# Patient Record
Sex: MALE | Race: BLACK OR AFRICAN AMERICAN | NOT HISPANIC OR LATINO | Employment: OTHER | URBAN - METROPOLITAN AREA
[De-identification: names, ages, dates, MRNs, and addresses within clinical notes are randomized per-mention and may not be internally consistent; named-entity substitution may affect disease eponyms.]

---

## 2017-03-20 ENCOUNTER — CLINICAL SUPPORT (OUTPATIENT)
Dept: ELECTROPHYSIOLOGY | Facility: CLINIC | Age: 62
End: 2017-03-20
Payer: MEDICARE

## 2017-03-20 DIAGNOSIS — I42.8 NICM (NONISCHEMIC CARDIOMYOPATHY): ICD-10-CM

## 2017-03-20 DIAGNOSIS — Z95.810 ICD (IMPLANTABLE CARDIOVERTER-DEFIBRILLATOR) IN PLACE: ICD-10-CM

## 2017-03-20 PROCEDURE — 93296 REM INTERROG EVL PM/IDS: CPT | Mod: PBBFAC | Performed by: INTERNAL MEDICINE

## 2017-03-20 PROCEDURE — 93297 REM INTERROG DEV EVAL ICPMS: CPT | Mod: ,,, | Performed by: INTERNAL MEDICINE

## 2017-03-20 PROCEDURE — 93295 DEV INTERROG REMOTE 1/2/MLT: CPT | Mod: ,,, | Performed by: INTERNAL MEDICINE

## 2017-11-20 ENCOUNTER — TELEPHONE (OUTPATIENT)
Dept: ELECTROPHYSIOLOGY | Facility: CLINIC | Age: 62
End: 2017-11-20

## 2017-11-20 NOTE — TELEPHONE ENCOUNTER
I called patient to have him send a manual ICD transmission. He was just recently transferred into our device clinic. No answer. I left a voice message for him to return my call.

## 2017-11-21 ENCOUNTER — TELEPHONE (OUTPATIENT)
Dept: ELECTROPHYSIOLOGY | Facility: CLINIC | Age: 62
End: 2017-11-21

## 2017-11-21 NOTE — TELEPHONE ENCOUNTER
I tried to contact patient again about his remote ICD monitor. No answer. I left a voice message for him to return my call.

## 2017-11-21 NOTE — TELEPHONE ENCOUNTER
I called patient to have him send a manual transmission of his ICD. No answer. I left a voice message for him to manually send his transmission. I left my number incase he should have any questions or problems.

## 2017-11-24 ENCOUNTER — TELEPHONE (OUTPATIENT)
Dept: ELECTROPHYSIOLOGY | Facility: CLINIC | Age: 62
End: 2017-11-24

## 2017-11-24 NOTE — TELEPHONE ENCOUNTER
I spoke with patients wife. She informed me that she does not think patient is being followed here for his device checks any more. I asked her to have him give me a call and speak with me so I can document this in his chart. I gave her my direct number to have him call back at. She stated she would have him give me a call. I reschedule him for 12/06/2017 for now.

## 2017-12-06 ENCOUNTER — TELEPHONE (OUTPATIENT)
Dept: ELECTROPHYSIOLOGY | Facility: CLINIC | Age: 62
End: 2017-12-06

## 2017-12-06 NOTE — TELEPHONE ENCOUNTER
I called patient about his remote ICD transmission. He informed me that he is no longer followed by [facility] doctors.

## 2020-10-15 ENCOUNTER — TELEPHONE (OUTPATIENT)
Dept: TRANSPLANT | Facility: CLINIC | Age: 65
End: 2020-10-15

## 2020-10-15 DIAGNOSIS — I42.8 NICM (NONISCHEMIC CARDIOMYOPATHY): Primary | ICD-10-CM

## 2020-10-15 NOTE — TELEPHONE ENCOUNTER
"REFERRAL NOTE:    Rocco France has been referred to the pre-heart transplant office for consideration for orthotopic heart transplantation by Teresa Mendoza .  Patient's appointments have been scheduled for 10/20/20.  Information was provided and questions were answered.  Copies of "What to Expect" and "Cardiomyopathy and Heart Transplant" were mailed to the patient.    Pt's wife is a heart transplant pt from approx 5 years ago so pt is familiar with the process.   "

## 2020-10-20 ENCOUNTER — OFFICE VISIT (OUTPATIENT)
Dept: TRANSPLANT | Facility: CLINIC | Age: 65
End: 2020-10-20
Payer: MEDICARE

## 2020-10-20 ENCOUNTER — HOSPITAL ENCOUNTER (OUTPATIENT)
Dept: PULMONOLOGY | Facility: CLINIC | Age: 65
Discharge: HOME | End: 2020-10-20
Payer: MEDICARE

## 2020-10-20 VITALS
HEART RATE: 81 BPM | HEIGHT: 74 IN | WEIGHT: 187 LBS | BODY MASS INDEX: 24.25 KG/M2 | BODY MASS INDEX: 24 KG/M2 | DIASTOLIC BLOOD PRESSURE: 76 MMHG | HEIGHT: 74 IN | SYSTOLIC BLOOD PRESSURE: 116 MMHG | WEIGHT: 188.94 LBS

## 2020-10-20 DIAGNOSIS — I10 ESSENTIAL HYPERTENSION: ICD-10-CM

## 2020-10-20 DIAGNOSIS — E78.2 MIXED HYPERLIPIDEMIA: ICD-10-CM

## 2020-10-20 DIAGNOSIS — I50.42 CHRONIC COMBINED SYSTOLIC AND DIASTOLIC CONGESTIVE HEART FAILURE: Primary | ICD-10-CM

## 2020-10-20 DIAGNOSIS — I42.8 NICM (NONISCHEMIC CARDIOMYOPATHY): ICD-10-CM

## 2020-10-20 PROCEDURE — 94618 PULMONARY STRESS TESTING: ICD-10-PCS | Mod: 26,S$PBB,NTX, | Performed by: INTERNAL MEDICINE

## 2020-10-20 PROCEDURE — 99214 OFFICE O/P EST MOD 30 MIN: CPT | Mod: PBBFAC,TXP,25 | Performed by: INTERNAL MEDICINE

## 2020-10-20 PROCEDURE — 94618 PULMONARY STRESS TESTING: CPT | Mod: 26,S$PBB,NTX, | Performed by: INTERNAL MEDICINE

## 2020-10-20 PROCEDURE — 99999 PR PBB SHADOW E&M-EST. PATIENT-LVL IV: CPT | Mod: PBBFAC,TXP,, | Performed by: INTERNAL MEDICINE

## 2020-10-20 PROCEDURE — 99204 PR OFFICE/OUTPT VISIT, NEW, LEVL IV, 45-59 MIN: ICD-10-PCS | Mod: S$PBB,TXP,, | Performed by: INTERNAL MEDICINE

## 2020-10-20 PROCEDURE — 99999 PR PBB SHADOW E&M-EST. PATIENT-LVL IV: ICD-10-PCS | Mod: PBBFAC,TXP,, | Performed by: INTERNAL MEDICINE

## 2020-10-20 PROCEDURE — 99204 OFFICE O/P NEW MOD 45 MIN: CPT | Mod: S$PBB,TXP,, | Performed by: INTERNAL MEDICINE

## 2020-10-20 PROCEDURE — 94618 PULMONARY STRESS TESTING: CPT | Mod: PBBFAC,NTX | Performed by: INTERNAL MEDICINE

## 2020-10-20 RX ORDER — PANTOPRAZOLE SODIUM 40 MG/1
40 TABLET, DELAYED RELEASE ORAL DAILY
Status: ON HOLD | COMMUNITY
End: 2021-02-04 | Stop reason: HOSPADM

## 2020-10-20 RX ORDER — SPIRONOLACTONE 25 MG/1
25 TABLET ORAL DAILY
Status: ON HOLD | COMMUNITY
Start: 2020-10-14 | End: 2021-02-04 | Stop reason: HOSPADM

## 2020-10-20 RX ORDER — SACUBITRIL AND VALSARTAN 24; 26 MG/1; MG/1
1 TABLET, FILM COATED ORAL 2 TIMES DAILY
Qty: 90 TABLET | Refills: 2 | Status: ON HOLD | OUTPATIENT
Start: 2020-10-20 | End: 2021-02-04 | Stop reason: HOSPADM

## 2020-10-20 RX ORDER — CLOPIDOGREL BISULFATE 75 MG/1
75 TABLET ORAL DAILY
Status: ON HOLD | COMMUNITY
End: 2021-02-04 | Stop reason: HOSPADM

## 2020-10-20 RX ORDER — GLIMEPIRIDE 4 MG/1
4 TABLET ORAL
Status: ON HOLD | COMMUNITY
End: 2021-02-04 | Stop reason: HOSPADM

## 2020-10-20 RX ORDER — FENOFIBRATE 160 MG/1
160 TABLET ORAL DAILY
Status: ON HOLD | COMMUNITY
End: 2021-02-04 | Stop reason: HOSPADM

## 2020-10-20 RX ORDER — METFORMIN HYDROCHLORIDE 500 MG/1
500 TABLET ORAL 2 TIMES DAILY WITH MEALS
Status: ON HOLD | COMMUNITY
End: 2021-02-04 | Stop reason: HOSPADM

## 2020-10-20 RX ORDER — CARVEDILOL 6.25 MG/1
6.25 TABLET ORAL 2 TIMES DAILY
Status: ON HOLD | COMMUNITY
End: 2021-02-04 | Stop reason: HOSPADM

## 2020-10-20 RX ORDER — ASPIRIN 81 MG/1
81 TABLET ORAL DAILY
Status: ON HOLD | COMMUNITY
End: 2021-02-04 | Stop reason: HOSPADM

## 2020-10-20 RX ORDER — FUROSEMIDE 40 MG/1
40 TABLET ORAL DAILY
Status: ON HOLD | COMMUNITY
Start: 2019-10-25 | End: 2021-02-04 | Stop reason: HOSPADM

## 2020-10-20 RX ORDER — NITROGLYCERIN 0.4 MG/1
0.4 TABLET SUBLINGUAL
Status: ON HOLD | COMMUNITY
End: 2021-02-04 | Stop reason: HOSPADM

## 2020-10-20 NOTE — LETTER
October 20, 2020        Teresa Mendoza  [address]  [address]  [facility] [place] [address]  Phone: [phone]  Fax: [phone]             [id number]  [address]  [place] [address]  Phone: [phone]   Patient: Rocco France   MR Number: [id number]   YOB: 1955   Date of Visit: 10/20/2020       Dear Dr. Teresa Mendoza    Thank you for referring Rocco France to me for evaluation. Attached you will find relevant portions of my assessment and plan of care.    If you have questions, please do not hesitate to call me. I look forward to following Rocco France along with you.    Sincerely,    Mike Chauhan MD    Enclosure    If you would like to receive this communication electronically, please contact [email or web address] or [phone] to request [facility] access.    [facility] is a tool which provides read-only access to select patient information with whom you have a relationship. Its easy to use and provides real time access to review your patients record including encounter summaries, notes, results, and demographic information.    If you feel you have received this communication in error or would no longer like to receive these types of communications, please e-mail [email or web address]

## 2020-10-20 NOTE — H&P (VIEW-ONLY)
"Subjective:   Transplant status: active    HPI:  Mr. France is a very pleasant  65 y.o. year old black male with stage C HFrEF (EF=25%), NICMP, s/p ICD who is referred for evaluation for advanced HF options. Followed by [facility]. Clinically reports NYHA class III symptoms.Occasional PND and orthopnea.  Has had 2 HF admissions this year. His current HF regimen includes; low dose entresto 24./26 mg BID, coreg 6.25 mg BID, aldactone 25 mg daily and lasix 40 mg daily. He has an ICD and reports no recent  ICD shocks.     Past Medical History:   Diagnosis Date    Diabetes mellitus     Hypertension      Past Surgical History:   Procedure Laterality Date    CARDIAC CATHETERIZATION  2010    no stents    CARDIAC DEFIBRILLATOR PLACEMENT  2010       Review of Systems   Constitution: Positive for malaise/fatigue. Negative for chills, decreased appetite, diaphoresis, fever, night sweats, weight gain and weight loss.   Eyes: Negative.    Cardiovascular: Positive for dyspnea on exertion, orthopnea and paroxysmal nocturnal dyspnea. Negative for chest pain, claudication, cyanosis, irregular heartbeat, leg swelling, near-syncope, palpitations and syncope.   Respiratory: Negative for cough, hemoptysis and shortness of breath.    Endocrine: Negative.    Hematologic/Lymphatic: Negative.    Skin: Negative for color change, dry skin and nail changes.   Musculoskeletal: Negative.    Gastrointestinal: Negative.    Genitourinary: Negative.    Neurological: Negative for weakness.       Objective:   Blood pressure 116/76, pulse 81, height 6' 2" (1.88 m), weight 85.7 kg (188 lb 15 oz).body mass index is 24.26 kg/m².  Physical Exam   Constitutional: He appears well-developed.   /76 (BP Location: Left arm, Patient Position: Sitting, BP Method: Large (Automatic))   Pulse 81   Ht 6' 2" (1.88 m)   Wt 85.7 kg (188 lb 15 oz)   BMI 24.26 kg/m²      HENT:   Head: Normocephalic.   Neck: No JVD present. Carotid bruit is not present. "   Cardiovascular: Regular rhythm and normal heart sounds. PMI is displaced.   No murmur heard.  Pulmonary/Chest: Effort normal and breath sounds normal. No respiratory distress. He has no wheezes. He has no rales.   Abdominal: Soft. Bowel sounds are normal. He exhibits no distension. There is no abdominal tenderness. There is no rebound.   Neurological: He is alert.   Skin: Skin is warm.   Vitals reviewed.      Labs:    Chemistry        Component Value Date/Time     10/20/2020 0754    K 4.5 10/20/2020 0754     10/20/2020 0754    CO2 28 10/20/2020 0754    BUN 13 10/20/2020 0754    CREATININE 1.0 10/20/2020 0754     (H) 10/20/2020 0754        Component Value Date/Time    CALCIUM 8.8 10/20/2020 0754    ALKPHOS 127 10/20/2020 0754    AST 21 10/20/2020 0754    ALT 14 10/20/2020 0754    BILITOT 2.4 (H) 10/20/2020 0754    ESTGFRAFRICA >60.0 10/20/2020 0754    EGFRNONAA >60.0 10/20/2020 0754          No results found for: MG  Lab Results   Component Value Date    WBC 6.87 10/20/2020    HGB 13.8 (L) 10/20/2020    HCT 44.2 10/20/2020     10/20/2020     No results found for: INR, PROTIME  BNP   Date Value Ref Range Status   10/20/2020 1,358 (H) 0 - 99 pg/mL Final     Comment:     Values of less than 100 pg/ml are consistent with non-CHF populations.       Assessment:      1. Chronic combined systolic and diastolic congestive heart failure    2. NICM (nonischemic cardiomyopathy)    3. Essential hypertension    4. Mixed hyperlipidemia        Plan:   Stage C HFrEF with NYHA class III symptoms and a recent HF admission. Recommend the following;  Risk stratification with a RHC to assess his hemodynamics.   2D echo with CFD to assess LV/RV size and function   CPX  Patient is now NYHA III  Recommend 2 gram sodium restriction and 1500cc fluid restriction.  Encourage physical activity with graded exercise program.  Requested patient to weigh themselves daily, and to notify us if their weight increases by more  than 3 lbs in 1 day or 5 lbs in 1 week.     Transplant candidacy:  Patient is a 65 y.o. year old male with heart failure is being seen for possible LVAD and OHT. he is scheduled for risk stratification testing with CPX/RHC. The patient will follow up with pre-transplant.    Thank you for allowing me to participate in the care of this very pleasant patient. Do not hesitate to contact me should you have any questions.    Mike Chauhan MD

## 2020-10-21 NOTE — PROCEDURES
Rocco France is a 65 y.o.  male patient, who presents for a 6 minute walk test ordered by MD Gissel.  The diagnosis is Pre Heart Transplant.  The patient's BMI is 24 kg/m2.  Predicted distance (lower limit of normal) is 401.26 meters.      Test Results:    The test was completed without stopping.   The total time walked was 360 seconds.  During walking, the patient reported:  Dyspnea. The patient used no assistive devices  during testing.     10/20/2020---------Distance: 304.8 meters (1000 feet)     O2 Sat % Supplemental Oxygen Heart Rate Blood Pressure Heath Scale   Pre-exercise  (Resting) 100 % Room Air 81 bpm 112/75 mmHg 0   During Exercise 98 % Room Air 92 bpm 113/71 mmHg 3   Post-exercise  (Recovery) 99 % Room Air  88 bpm   mmHg       Recovery Time: 68 seconds    Performing nurse/tech: Bria CALABRESE      PREVIOUS STUDY:   The patient has not had a previous study.      CLINICAL INTERPRETATION:  Six minute walk distance is 304.8 meters (1000 feet) with moderate dyspnea.  During exercise, there was no significant desaturation while breathing room air.  Both blood pressure and heart rate remained stable with walking.  The patient did not report non-pulmonary symptoms during exercise.  No previous study performed.  Based upon age and body mass index, exercise capacity is less than predicted.

## 2020-11-02 ENCOUNTER — TELEPHONE (OUTPATIENT)
Dept: TRANSPLANT | Facility: CLINIC | Age: 65
End: 2020-11-02

## 2020-11-02 ENCOUNTER — CLINICAL SUPPORT (OUTPATIENT)
Dept: TRANSPLANT | Facility: CLINIC | Age: 65
End: 2020-11-02
Payer: MEDICARE

## 2020-11-02 ENCOUNTER — DOCUMENTATION ONLY (OUTPATIENT)
Dept: TRANSPLANT | Facility: CLINIC | Age: 65
End: 2020-11-02

## 2020-11-02 ENCOUNTER — EDUCATION (OUTPATIENT)
Dept: TRANSPLANT | Facility: CLINIC | Age: 65
End: 2020-11-02

## 2020-11-02 ENCOUNTER — HOSPITAL ENCOUNTER (OUTPATIENT)
Dept: CARDIOLOGY | Facility: HOSPITAL | Age: 65
Discharge: HOME | End: 2020-11-02
Attending: INTERNAL MEDICINE
Payer: MEDICARE

## 2020-11-02 ENCOUNTER — HOSPITAL ENCOUNTER (OUTPATIENT)
Facility: HOSPITAL | Age: 65
Discharge: HOME | End: 2020-11-02
Attending: INTERNAL MEDICINE | Admitting: INTERNAL MEDICINE
Payer: MEDICARE

## 2020-11-02 VITALS
SYSTOLIC BLOOD PRESSURE: 125 MMHG | BODY MASS INDEX: 23.49 KG/M2 | OXYGEN SATURATION: 99 % | HEIGHT: 74 IN | DIASTOLIC BLOOD PRESSURE: 82 MMHG | TEMPERATURE: 97 F | HEART RATE: 88 BPM | WEIGHT: 183 LBS

## 2020-11-02 VITALS
HEART RATE: 83 BPM | SYSTOLIC BLOOD PRESSURE: 119 MMHG | DIASTOLIC BLOOD PRESSURE: 85 MMHG | WEIGHT: 183 LBS | BODY MASS INDEX: 23.49 KG/M2 | HEIGHT: 74 IN

## 2020-11-02 VITALS
HEART RATE: 88 BPM | BODY MASS INDEX: 24 KG/M2 | HEIGHT: 74 IN | DIASTOLIC BLOOD PRESSURE: 75 MMHG | WEIGHT: 187 LBS | SYSTOLIC BLOOD PRESSURE: 125 MMHG

## 2020-11-02 DIAGNOSIS — I50.22 CHRONIC SYSTOLIC HEART FAILURE: ICD-10-CM

## 2020-11-02 DIAGNOSIS — I50.42 CHRONIC COMBINED SYSTOLIC AND DIASTOLIC CONGESTIVE HEART FAILURE: ICD-10-CM

## 2020-11-02 DIAGNOSIS — I50.9 HEART FAILURE, UNSPECIFIED HF CHRONICITY, UNSPECIFIED HEART FAILURE TYPE: ICD-10-CM

## 2020-11-02 DIAGNOSIS — Z76.82 ORGAN TRANSPLANT CANDIDATE: ICD-10-CM

## 2020-11-02 DIAGNOSIS — Z13.9 SCREENING FOR UNSPECIFIED CONDITION: Primary | ICD-10-CM

## 2020-11-02 DIAGNOSIS — I42.8 NICM (NONISCHEMIC CARDIOMYOPATHY): Primary | ICD-10-CM

## 2020-11-02 LAB
ASCENDING AORTA: 3.66 CM
AV INDEX (PROSTH): 0.99
AV MEAN GRADIENT: 1 MMHG
AV PEAK GRADIENT: 2 MMHG
AV VALVE AREA: 3.86 CM2
AV VELOCITY RATIO: 0.79
BASOPHILS # BLD AUTO: 0.05 K/UL (ref 0–0.2)
BASOPHILS NFR BLD: 0.7 % (ref 0–1.9)
BSA FOR ECHO PROCEDURE: 2.1 M2
CV ECHO LV RWT: 0.22 CM
CV STRESS BASE HR: 83 BPM
DIASTOLIC BLOOD PRESSURE: 85 MMHG
DIFFERENTIAL METHOD: ABNORMAL
DOP CALC AO PEAK VEL: 0.67 M/S
DOP CALC AO VTI: 9.19 CM
DOP CALC LVOT AREA: 3.9 CM2
DOP CALC LVOT DIAMETER: 2.23 CM
DOP CALC LVOT PEAK VEL: 0.53 M/S
DOP CALC LVOT STROKE VOLUME: 35.48 CM3
DOP CALCLVOT PEAK VEL VTI: 9.09 CM
E WAVE DECELERATION TIME: 94.57 MSEC
E/A RATIO: 3.15
E/E' RATIO: 20.8 M/S
ECHO LV POSTERIOR WALL: 0.76 CM (ref 0.6–1.1)
EOSINOPHIL # BLD AUTO: 0.3 K/UL (ref 0–0.5)
EOSINOPHIL NFR BLD: 4 % (ref 0–8)
ERYTHROCYTE [DISTWIDTH] IN BLOOD BY AUTOMATED COUNT: 15.6 % (ref 11.5–14.5)
FRACTIONAL SHORTENING: 8 % (ref 28–44)
HCT VFR BLD AUTO: 47 % (ref 40–54)
HGB BLD-MCNC: 14.7 G/DL (ref 14–18)
IMM GRANULOCYTES # BLD AUTO: 0.01 K/UL (ref 0–0.04)
IMM GRANULOCYTES NFR BLD AUTO: 0.1 % (ref 0–0.5)
INTERVENTRICULAR SEPTUM: 0.8 CM (ref 0.6–1.1)
IVRT: 71.36 MSEC
LA MAJOR: 6.93 CM
LA MINOR: 6.78 CM
LA WIDTH: 4.92 CM
LEFT ATRIUM SIZE: 4.59 CM
LEFT ATRIUM VOLUME INDEX: 62.3 ML/M2
LEFT ATRIUM VOLUME: 131.57 CM3
LEFT INTERNAL DIMENSION IN SYSTOLE: 6.3 CM (ref 2.1–4)
LEFT VENTRICLE DIASTOLIC VOLUME INDEX: 115.71 ML/M2
LEFT VENTRICLE DIASTOLIC VOLUME: 244.38 ML
LEFT VENTRICLE MASS INDEX: 108 G/M2
LEFT VENTRICLE SYSTOLIC VOLUME INDEX: 95.4 ML/M2
LEFT VENTRICLE SYSTOLIC VOLUME: 201.39 ML
LEFT VENTRICULAR INTERNAL DIMENSION IN DIASTOLE: 6.86 CM (ref 3.5–6)
LEFT VENTRICULAR MASS: 228.67 G
LV LATERAL E/E' RATIO: 17.33 M/S
LV SEPTAL E/E' RATIO: 26 M/S
LYMPHOCYTES # BLD AUTO: 2.3 K/UL (ref 1–4.8)
LYMPHOCYTES NFR BLD: 32.4 % (ref 18–48)
MCH RBC QN AUTO: 30.1 PG (ref 27–31)
MCHC RBC AUTO-ENTMCNC: 31.3 G/DL (ref 32–36)
MCV RBC AUTO: 96 FL (ref 82–98)
MONOCYTES # BLD AUTO: 0.9 K/UL (ref 0.3–1)
MONOCYTES NFR BLD: 12.5 % (ref 4–15)
MV PEAK A VEL: 0.33 M/S
MV PEAK E VEL: 1.04 M/S
MV STENOSIS PRESSURE HALF TIME: 27.42 MS
MV VALVE AREA P 1/2 METHOD: 8.02 CM2
NEUTROPHILS # BLD AUTO: 3.5 K/UL (ref 1.8–7.7)
NEUTROPHILS NFR BLD: 50.3 % (ref 38–73)
NRBC BLD-RTO: 0 /100 WBC
OHS CV CPX 1 MINUTE RECOVERY HEART RATE: 98 BPM
OHS CV CPX 85 PERCENT MAX PREDICTED HEART RATE MALE: 132
OHS CV CPX ANAEROBIC THRESHOLD DIASTOLIC BLOOD PRESSURE: 69 MMHG
OHS CV CPX ANAEROBIC THRESHOLD HEART RATE: 95
OHS CV CPX ANAEROBIC THRESHOLD RATE PRESSURE PRODUCT: NORMAL
OHS CV CPX ANAEROBIC THRESHOLD SYSTOLIC BLOOD PRESSURE: 112
OHS CV CPX DATA GRADE - AT: 2.7
OHS CV CPX DATA GRADE - PEAK: 3.5
OHS CV CPX DATA O2 SAT - PEAK: 96
OHS CV CPX DATA O2 SAT - REST: 98
OHS CV CPX DATA SPEED - AT: 2.3
OHS CV CPX DATA SPEED - PEAK: 2.6
OHS CV CPX DATA TIME - AT: 4.42
OHS CV CPX DATA TIME - PEAK: 5.45
OHS CV CPX DATA VE/VCO2 - AT: 42
OHS CV CPX DATA VE/VCO2 - PEAK: 47
OHS CV CPX DATA VE/VO2 - AT: 43
OHS CV CPX DATA VE/VO2 - PEAK: 50
OHS CV CPX DATA VO2 - AT: 12.5
OHS CV CPX DATA VO2 - PEAK: 13.5
OHS CV CPX DATA VO2 - REST: 4.3
OHS CV CPX FEV1/FVC: 0.74
OHS CV CPX FORCED EXPIRATORY VOLUME: 2.51
OHS CV CPX FORCED VITAL CAPACITY (FVC): 3.4
OHS CV CPX HIGHEST VO: 30
OHS CV CPX MAX PREDICTED HEART RATE: 155
OHS CV CPX MAXIMAL VOLUNTARY VENTILATION (MVV) PREDICTED: 100.4
OHS CV CPX MAXIMAL VOLUNTARY VENTILATION (MVV): 76
OHS CV CPX MAXIUMUM EXERCISE VENTILATION (VE MAX): 40.9
OHS CV CPX PATIENT AGE: 65
OHS CV CPX PATIENT HEIGHT IN: 74
OHS CV CPX PATIENT IS FEMALE AGE 11-19: 0
OHS CV CPX PATIENT IS FEMALE AGE GREATER THAN 19: 0
OHS CV CPX PATIENT IS FEMALE AGE LESS THAN 11: 0
OHS CV CPX PATIENT IS FEMALE: 0
OHS CV CPX PATIENT IS MALE AGE 11-25: 0
OHS CV CPX PATIENT IS MALE AGE GREATER THAN 25: 1
OHS CV CPX PATIENT IS MALE AGE LESS THAN 11: 0
OHS CV CPX PATIENT IS MALE GREATER THAN 18: 1
OHS CV CPX PATIENT IS MALE LESS THAN OR EQUAL TO 18: 0
OHS CV CPX PATIENT IS MALE: 1
OHS CV CPX PATIENT WEIGHT RETURNED IN OZ: 2928
OHS CV CPX PEAK DIASTOLIC BLOOD PRESSURE: 64 MMHG
OHS CV CPX PEAK HEAR RATE: 99 BPM
OHS CV CPX PEAK RATE PRESSURE PRODUCT: 9801
OHS CV CPX PEAK SYSTOLIC BLOOD PRESSURE: 99 MMHG
OHS CV CPX PERCENT BODY FAT: 10
OHS CV CPX PERCENT MAX PREDICTED HEART RATE ACHIEVED: 64
OHS CV CPX PREDICTED VO2: 30 ML/KG/MIN
OHS CV CPX RATE PRESSURE PRODUCT PRESENTING: 9877
OHS CV CPX REST PET CO2: 29
OHS CV CPX VE/VCO2 SLOPE: 40.6
PISA TR MAX VEL: 3.49 M/S
PLATELET # BLD AUTO: 181 K/UL (ref 150–350)
PMV BLD AUTO: 10.9 FL (ref 9.2–12.9)
RA MAJOR: 5.56 CM
RA PRESSURE: 8 MMHG
RA WIDTH: 5.89 CM
RBC # BLD AUTO: 4.88 M/UL (ref 4.6–6.2)
RIGHT VENTRICULAR END-DIASTOLIC DIMENSION: 5.73 CM
RV TISSUE DOPPLER FREE WALL SYSTOLIC VELOCITY 1 (APICAL 4 CHAMBER VIEW): 8.22 CM/S
SARS-COV-2 RDRP RESP QL NAA+PROBE: NEGATIVE
SINUS: 3.62 CM
STJ: 2.78 CM
STRESS ECHO POST EXERCISE DUR MIN: 5 MINUTES
STRESS ECHO POST EXERCISE DUR SEC: 27 SECONDS
SYSTOLIC BLOOD PRESSURE: 119 MMHG
TDI LATERAL: 0.06 M/S
TDI SEPTAL: 0.04 M/S
TDI: 0.05 M/S
TR MAX PG: 49 MMHG
TRICUSPID ANNULAR PLANE SYSTOLIC EXCURSION: 1.65 CM
TV REST PULMONARY ARTERY PRESSURE: 57 MMHG
WBC # BLD AUTO: 7.03 K/UL (ref 3.9–12.7)

## 2020-11-02 PROCEDURE — C1894 INTRO/SHEATH, NON-LASER: HCPCS | Mod: NTX | Performed by: INTERNAL MEDICINE

## 2020-11-02 PROCEDURE — C8929 TTE W OR WO FOL WCON,DOPPLER: HCPCS | Mod: TXP

## 2020-11-02 PROCEDURE — U0002 COVID-19 LAB TEST NON-CDC: HCPCS | Mod: TXP

## 2020-11-02 PROCEDURE — 93306 TTE W/DOPPLER COMPLETE: CPT | Mod: 26,NTX,, | Performed by: INTERNAL MEDICINE

## 2020-11-02 PROCEDURE — 94621 CARDIOPULM EXERCISE TESTING: CPT | Mod: TXP

## 2020-11-02 PROCEDURE — 63600175 PHARM REV CODE 636 W HCPCS: Mod: TXP | Performed by: INTERNAL MEDICINE

## 2020-11-02 PROCEDURE — 93451 PR RIGHT HEART CATH O2 SATURATION & CARDIAC OUTPUT: ICD-10-PCS | Mod: 26,NTX,, | Performed by: INTERNAL MEDICINE

## 2020-11-02 PROCEDURE — 94621 CARDIOPULMONARY EXERCISE TESTING (CUPID ONLY): ICD-10-PCS | Mod: 26,NTX,, | Performed by: INTERNAL MEDICINE

## 2020-11-02 PROCEDURE — 93451 RIGHT HEART CATH: CPT | Mod: 26,NTX,, | Performed by: INTERNAL MEDICINE

## 2020-11-02 PROCEDURE — 94621 CARDIOPULM EXERCISE TESTING: CPT | Mod: 26,NTX,, | Performed by: INTERNAL MEDICINE

## 2020-11-02 PROCEDURE — C1751 CATH, INF, PER/CENT/MIDLINE: HCPCS | Mod: TXP | Performed by: INTERNAL MEDICINE

## 2020-11-02 PROCEDURE — 85025 COMPLETE CBC W/AUTO DIFF WBC: CPT | Mod: TXP

## 2020-11-02 PROCEDURE — 25000003 PHARM REV CODE 250: Mod: NTX | Performed by: INTERNAL MEDICINE

## 2020-11-02 PROCEDURE — 93451 RIGHT HEART CATH: CPT | Mod: NTX | Performed by: INTERNAL MEDICINE

## 2020-11-02 PROCEDURE — 93306 ECHO (CUPID ONLY): ICD-10-PCS | Mod: 26,NTX,, | Performed by: INTERNAL MEDICINE

## 2020-11-02 RX ORDER — LIDOCAINE HYDROCHLORIDE AND EPINEPHRINE 10; 10 MG/ML; UG/ML
INJECTION, SOLUTION INFILTRATION; PERINEURAL
Status: DISCONTINUED | OUTPATIENT
Start: 2020-11-02 | End: 2020-11-02 | Stop reason: HOSPADM

## 2020-11-02 RX ORDER — SODIUM CHLORIDE 9 MG/ML
INJECTION, SOLUTION INTRAVENOUS
Status: DISCONTINUED | OUTPATIENT
Start: 2020-11-02 | End: 2020-11-02 | Stop reason: HOSPADM

## 2020-11-02 RX ORDER — NITROGLYCERIN 400 UG/1
SPRAY ORAL
Status: DISCONTINUED | OUTPATIENT
Start: 2020-11-02 | End: 2020-11-02 | Stop reason: HOSPADM

## 2020-11-02 RX ADMIN — HUMAN ALBUMIN MICROSPHERES AND PERFLUTREN 0.66 MG: 10; .22 INJECTION, SOLUTION INTRAVENOUS at 08:11

## 2020-11-02 NOTE — INTERVAL H&P NOTE
The patient has been examined and the H&P has been reviewed:    I concur with the findings and no changes have occurred since H&P was written. Patient presents for right heart catheterization to evaluate filling pressures and cardiac flows. I have explained the risks, benefits, and alternatives of the procedure in detail.  The patient voices understanding and all questions have been answered.  The patient agrees to proceed as planned.      Surgery risks, benefits and alternative options discussed and understood by patient/family.          There are no hospital problems to display for this patient.

## 2020-11-02 NOTE — BRIEF OP NOTE
Patient tolerated the procedure well. Please see complete RHC procedure note for full details and results. Stable to return from cath lab to their hospital room.  Procedure: Right heart catheterization   Procedure date: 11/02/20    Discharge date: 11/02/20  Estimated blood loss: less than 10 cc  Complications: none  Condition at discharge: stable  Discharge instructions: no heavy lifting greater than 5 lbs and no bearing down/coughing without holding pressure over incision site.  Activity: as tolerated after 24 hours  Diet: as tolerated.

## 2020-11-02 NOTE — PLAN OF CARE
Pt oriented to unit upon arrival.  Pre op orders and assessment initiated. Vss.  Pt in no acute distress and verbalizes no complaints.  Will continue to monitor.  Call bell within reach.

## 2020-11-02 NOTE — PLAN OF CARE
Pt transferred from cath lab via stretcher.  Vss.  r ij site remains cdi.  0 bleed, 0 hematoma.  Post op orders and assessment initiated.  Pt in no acute distress and verbalizes no complaints.  Will continue to monitor.  Call bell within reach.

## 2020-11-02 NOTE — TELEPHONE ENCOUNTER
Met with pt in clinic along with Dr Chauhan to review risk stratification testing results.  Per order of Dr Chauhan, pt needs to initiated oht/vad eval.   Financial clearance requested.

## 2020-11-02 NOTE — DISCHARGE INSTRUCTIONS

## 2020-11-02 NOTE — Clinical Note
The PA catheter is repositioned to the main pulmonary artery. Hemodynamics performed. Cardiac output obtained at 4 L/min. O2 saturation measured at 63%.

## 2020-11-02 NOTE — PROGRESS NOTES
PRE-EDUCATION BOOKLET NOTE:    Met with Rocco France and had a brief discussion regarding the advanced heart failure evaluation process including options for heart transplantation and/or left ventricular assist device (LVAD) implantation.     Heart Transplant Educational Booklet given to patient, which included the following handouts:  · Treatment options for Advanced Heart Failure: A Referral Guide for patients  · Pre Heart Transplant Coordinator Team Contact Information   · Recipient Informed Consent   · Wellness Contract  · Multiple Listing Protocol and [facility] toll free numbers   · VAD Education Packet   · Advanced Directives  · 8 Step Plan for Heart Failure Patients     Significant History:  · Prior sternotomies: no  · ICD (if yes, indicate brand of device): Yes: Med[employer]onic 1/19  · Blood transfusions (if yes, enter date and location): No  · Angiography (if yes, enter date and location): Yes, 2020 [facility]  · Colonoscopy (if yes, enter date and location): Yes: <10yrs ago, unsure of location. Pt will find out.   · Pap smear (if yes, enter date and location): N/A  · Mammogram (if yes, enter date and location): N/A  · Tobacco history (if yes, enter amount and date quit if applicable): current occassional smoker, approx 1 cigarette a week. pt willing to stop  · Stroke history:  no  · Thromboembolism history:  no    Question and answer session was conducted.  Patient was advised to bring the educational packet to future appointments and/or admissions.  Patient was encouraged to contact the coordinator team with any questions or concerns.  Understanding verbalized.    Financial clearance for eval requested.

## 2020-11-02 NOTE — PROGRESS NOTES
Received call from hematology lab that cbc is clotted and needs to be re-ordered and re-drawn.  Pt is currently in echo/cpx lab.  Stat order entered into [facility] admit.     Spoke with Tran in [facility] to arrange for stat draw when pt arrives.  Dr Lake updated.

## 2020-11-03 NOTE — TELEPHONE ENCOUNTER
Financial clearance received. Orders entered and schedulers notified. Pt/wife request to have all testing done in 2 consecutive days

## 2020-11-16 ENCOUNTER — SOCIAL WORK (OUTPATIENT)
Dept: TRANSPLANT | Facility: CLINIC | Age: 65
End: 2020-11-16
Payer: MEDICARE

## 2020-11-16 ENCOUNTER — CLINICAL SUPPORT (OUTPATIENT)
Dept: TRANSPLANT | Facility: CLINIC | Age: 65
End: 2020-11-16
Payer: MEDICARE

## 2020-11-16 ENCOUNTER — HOSPITAL ENCOUNTER (OUTPATIENT)
Dept: PULMONOLOGY | Facility: CLINIC | Age: 65
Discharge: HOME | End: 2020-11-16
Payer: MEDICARE

## 2020-11-16 ENCOUNTER — HOSPITAL ENCOUNTER (OUTPATIENT)
Dept: RADIOLOGY | Facility: HOSPITAL | Age: 65
Discharge: HOME | End: 2020-11-16
Attending: INTERNAL MEDICINE
Payer: MEDICARE

## 2020-11-16 ENCOUNTER — HOSPITAL ENCOUNTER (OUTPATIENT)
Dept: CARDIOLOGY | Facility: HOSPITAL | Age: 65
Discharge: HOME | End: 2020-11-16
Attending: INTERNAL MEDICINE
Payer: MEDICARE

## 2020-11-16 DIAGNOSIS — I50.9 HEART FAILURE, UNSPECIFIED HF CHRONICITY, UNSPECIFIED HEART FAILURE TYPE: ICD-10-CM

## 2020-11-16 DIAGNOSIS — Z76.82 ORGAN TRANSPLANT CANDIDATE: ICD-10-CM

## 2020-11-16 DIAGNOSIS — Z13.9 SCREENING FOR UNSPECIFIED CONDITION: Primary | ICD-10-CM

## 2020-11-16 LAB
LEFT ABI: 1.08
LEFT ARM BP: 106 MMHG
LEFT ARM DIASTOLIC BLOOD PRESSURE: 68 MMHG
LEFT ARM SYSTOLIC BLOOD PRESSURE: 99 MMHG
LEFT CBA DIAS: 15 CM/S
LEFT CBA SYS: 62 CM/S
LEFT CCA DIST DIAS: 18 CM/S
LEFT CCA DIST SYS: 75 CM/S
LEFT CCA MID DIAS: 18 CM/S
LEFT CCA MID SYS: 89 CM/S
LEFT CCA PROX DIAS: 13 CM/S
LEFT CCA PROX SYS: 69 CM/S
LEFT DORSALIS PEDIS: 114 MMHG
LEFT ECA DIAS: 9 CM/S
LEFT ECA SYS: 62 CM/S
LEFT ICA DIST DIAS: 26 CM/S
LEFT ICA DIST SYS: 67 CM/S
LEFT ICA MID DIAS: 25 CM/S
LEFT ICA MID SYS: 74 CM/S
LEFT ICA PROX DIAS: 25 CM/S
LEFT ICA PROX SYS: 69 CM/S
LEFT POSTERIOR TIBIAL: 104 MMHG
LEFT VERTEBRAL DIAS: 19 CM/S
LEFT VERTEBRAL SYS: 60 CM/S
OHS CV CAROTID RIGHT ICA EDV HIGHEST: 27
OHS CV CAROTID ULTRASOUND LEFT ICA/CCA RATIO: 0.99
OHS CV CAROTID ULTRASOUND RIGHT ICA/CCA RATIO: 1.4
OHS CV PV CAROTID LEFT HIGHEST CCA: 89
OHS CV PV CAROTID LEFT HIGHEST ICA: 74
OHS CV PV CAROTID RIGHT HIGHEST CCA: 77
OHS CV PV CAROTID RIGHT HIGHEST ICA: 84
OHS CV US CAROTID LEFT HIGHEST EDV: 26
RIGHT ABI: 1.11
RIGHT ARM BP: 100 MMHG
RIGHT ARM DIASTOLIC BLOOD PRESSURE: 65 MMHG
RIGHT ARM SYSTOLIC BLOOD PRESSURE: 100 MMHG
RIGHT CBA DIAS: 11 CM/S
RIGHT CBA SYS: 65 CM/S
RIGHT CCA DIST DIAS: 11 CM/S
RIGHT CCA DIST SYS: 60 CM/S
RIGHT CCA MID DIAS: 13 CM/S
RIGHT CCA MID SYS: 73 CM/S
RIGHT CCA PROX DIAS: 12 CM/S
RIGHT CCA PROX SYS: 77 CM/S
RIGHT DORSALIS PEDIS: 92 MMHG
RIGHT ECA DIAS: 5 CM/S
RIGHT ECA SYS: 78 CM/S
RIGHT ICA DIST DIAS: 26 CM/S
RIGHT ICA DIST SYS: 84 CM/S
RIGHT ICA MID DIAS: 23 CM/S
RIGHT ICA MID SYS: 80 CM/S
RIGHT ICA PROX DIAS: 27 CM/S
RIGHT ICA PROX SYS: 84 CM/S
RIGHT POSTERIOR TIBIAL: 118 MMHG
RIGHT VERTEBRAL DIAS: 15 CM/S
RIGHT VERTEBRAL SYS: 47 CM/S
SARS-COV-2 RDRP RESP QL NAA+PROBE: NEGATIVE

## 2020-11-16 PROCEDURE — 94010 BREATHING CAPACITY TEST: CPT | Mod: 26,S$PBB,TXP, | Performed by: INTERNAL MEDICINE

## 2020-11-16 PROCEDURE — 93880 EXTRACRANIAL BILAT STUDY: CPT | Mod: TXP

## 2020-11-16 PROCEDURE — 70450 CT HEAD WITHOUT CONTRAST: ICD-10-PCS | Mod: 26,TXP,, | Performed by: RADIOLOGY

## 2020-11-16 PROCEDURE — 94729 DIFFUSING CAPACITY: CPT | Mod: 26,S$PBB,TXP, | Performed by: INTERNAL MEDICINE

## 2020-11-16 PROCEDURE — 74176 CT CHEST ABDOMEN PELVIS WITHOUT CONTRAST(XPD): ICD-10-PCS | Mod: 26,TXP,, | Performed by: RADIOLOGY

## 2020-11-16 PROCEDURE — 94010 BREATHING CAPACITY TEST: CPT | Mod: PBBFAC,TXP | Performed by: INTERNAL MEDICINE

## 2020-11-16 PROCEDURE — 71250 CT THORAX DX C-: CPT | Mod: 26,TXP,, | Performed by: RADIOLOGY

## 2020-11-16 PROCEDURE — 94010 BREATHING CAPACITY TEST: ICD-10-PCS | Mod: 26,S$PBB,TXP, | Performed by: INTERNAL MEDICINE

## 2020-11-16 PROCEDURE — 70450 CT HEAD/BRAIN W/O DYE: CPT | Mod: 26,TXP,, | Performed by: RADIOLOGY

## 2020-11-16 PROCEDURE — 74176 CT ABD & PELVIS W/O CONTRAST: CPT | Mod: 26,TXP,, | Performed by: RADIOLOGY

## 2020-11-16 PROCEDURE — 93922 UPR/L XTREMITY ART 2 LEVELS: CPT | Mod: 26,TXP,, | Performed by: INTERNAL MEDICINE

## 2020-11-16 PROCEDURE — 93922 UPR/L XTREMITY ART 2 LEVELS: CPT | Mod: TXP

## 2020-11-16 PROCEDURE — 71250 CT THORAX DX C-: CPT | Mod: TC,TXP

## 2020-11-16 PROCEDURE — 94727 PR PULM FUNCTION TEST BY GAS: ICD-10-PCS | Mod: 26,S$PBB,TXP, | Performed by: INTERNAL MEDICINE

## 2020-11-16 PROCEDURE — 70450 CT HEAD/BRAIN W/O DYE: CPT | Mod: TC,TXP

## 2020-11-16 PROCEDURE — 71046 XR CHEST PA AND LATERAL: ICD-10-PCS | Mod: 26,TXP,, | Performed by: RADIOLOGY

## 2020-11-16 PROCEDURE — 71250 CT CHEST ABDOMEN PELVIS WITHOUT CONTRAST(XPD): ICD-10-PCS | Mod: 26,TXP,, | Performed by: RADIOLOGY

## 2020-11-16 PROCEDURE — 93922 ANKLE BRACHIAL INDICES (ABI): ICD-10-PCS | Mod: 26,TXP,, | Performed by: INTERNAL MEDICINE

## 2020-11-16 PROCEDURE — U0002 COVID-19 LAB TEST NON-CDC: HCPCS | Mod: TXP

## 2020-11-16 PROCEDURE — 94729 PR C02/MEMBANE DIFFUSE CAPACITY: ICD-10-PCS | Mod: 26,S$PBB,TXP, | Performed by: INTERNAL MEDICINE

## 2020-11-16 PROCEDURE — 93880 EXTRACRANIAL BILAT STUDY: CPT | Mod: 26,TXP,, | Performed by: INTERNAL MEDICINE

## 2020-11-16 PROCEDURE — 94729 DIFFUSING CAPACITY: CPT | Mod: PBBFAC,TXP | Performed by: INTERNAL MEDICINE

## 2020-11-16 PROCEDURE — 71046 X-RAY EXAM CHEST 2 VIEWS: CPT | Mod: 26,TXP,, | Performed by: RADIOLOGY

## 2020-11-16 PROCEDURE — 94727 GAS DIL/WSHOT DETER LNG VOL: CPT | Mod: PBBFAC,TXP | Performed by: INTERNAL MEDICINE

## 2020-11-16 PROCEDURE — 94727 GAS DIL/WSHOT DETER LNG VOL: CPT | Mod: 26,S$PBB,TXP, | Performed by: INTERNAL MEDICINE

## 2020-11-16 PROCEDURE — 71046 X-RAY EXAM CHEST 2 VIEWS: CPT | Mod: TC,TXP

## 2020-11-16 PROCEDURE — 74176 CT ABD & PELVIS W/O CONTRAST: CPT | Mod: TC,TXP

## 2020-11-16 PROCEDURE — 93880 CV US DOPPLER CAROTID (CUPID ONLY): ICD-10-PCS | Mod: 26,TXP,, | Performed by: INTERNAL MEDICINE

## 2020-11-16 NOTE — PROGRESS NOTES
Patient denies symptoms including cough, fevers, SOB, diarrhea, loss of taste or smell.   Informed patient of process for Covid test.  Patient denies prior nasal surgery. Pt verbalized permission to proceed with test.    Nasopharyngeal rapid specimen collected.  Patient tolerated procedure well.    Test results negative.  Pt notified.

## 2020-11-16 NOTE — PROGRESS NOTES
Left Ventricular Assist Device (LVAD) and Transplant Recipient Adult Psychosocial Assessment    SW met with pt and wife Meme France in clinic this afternoon. Wife reports that she received a Heart Transplant at [facility] in .      Rocco France  [address] [address]     About 1.5 Hours away from [facility]     Telephone Information:   Mobile [phone]   Mobile [phone]   Home  [phone] (home)  Work  There is no work phone number on file.  E-mail  No e-mail address on record    Sex: male  YOB: 1955  Age: 65 y.o.    Encounter Date: 2020  U[place] Citizen: yes  Primary Language: English   Needed: no    Emergency Contact:  Name: Meme France  Relationship: spouse  Address: [place]  Phone Numbers:  [phone] (mobile)    Family/Social Support:   Number of dependents/: none  Marital history: Pt and wife report that they have been  for 24 years. Pt and wife have adult children from previous relationships.     Other family dynamics: Pt's parents are . Pt has four sisters who are living, and one brother who passed away four years ago. Pt's brother Dorian received an LVAD about 5 years ago. Pt reports that he passed away about a year after his implant due to complications from a fall. Pt reports that he was close with his brother. Pt reports that his sister, Desi Blanco (64), will be assisting with his care if wife unavailable. She lives in [place], [place] and is able to drive. Pt reports having an adult dgtr, Laura Barron (43) who lives in [place], [place]. Pt also has one step-dgtr, Mary Oswaldo (43) who lives in [place] and a step-son, Ollie Joseph (47) who lives in [place], [place]. Pt reports that he and wife have a total of 9 grandchildren.     Household Composition:  Pt resides with his spouse.     Do you and your caregivers have access to reliable transportation? yes; Pt reports that he and wife share a vehicle (both drive). Pt also reports  that his sister Desi can provide transportation along with other family members and friends.    PRIMARY CAREGIVER: Meme France (67), pt's spouse, will be primary caregiver, phone number [phone].     Wife is retired and has flexibility. While she did receive a Heart Transplant in 2015, she reports that she is medically stable. Wife had a three week hospitalization in May 2020 due to pneumonia, but is fully recovered.      provided in-depth information to Patient and Caregiver regarding  regarding pre- and post-LVAD and pre- and post-transplant caregiver role.   strongly encourages Patient and Caregiver to have concrete plan regarding post-transplant care giving, including back-up caregiver(s) to ensure care giving needs are met as needed.    Patient and Caregiver states understanding all aspects of caregiver role/commitment and is able/willing/committed to being caregiver to the fullest extent necessary.      Patient and Caregiver verbalizes understanding of the education provided today and caregiver responsibilities.       remains available. Patient and Caregiver agree to contact  in a timely manner if concerns arise.      Able to take time off work without financial concerns: yes. Wife and sister are retired.     Additional Significant Others who will Assist with LVAD/Transplant:  Name: Desi Blanco (ph#[phone])  Age: 64  City: [place] State: [place]  Relationship: sister  Does person drive? yes     Pt reports that sister is a retired . Pt and wife aware that SW will need to meet with sister in person in order for pt to be a candidate for OHT. Pt reports that he will speak with sister and set up appt with SW once he is aware of her schedule.     Living Will: Pt has filled this form out and plans on turning in today  Healthcare Power of : Pt has filled out paperwork and identified wife as HCPA. Pt turning in paperwork today    Living  Donors: N/A    Highest Education Level: High School (9-12) or GED  Reading Ability: 12th grade  Reports difficulty with: vision-wears readers  Learns Best By:  Written, verbal and demonstration     Status: no  VA Benefits: no     Working for Income: No  If no, reason not working: Disability. Pt last worked in  as a  for 35+ years. Pt stopped working due to heart issues.     Spouse/Significant Other Employment: Wife is a retired . She was responsible for opening bridges so that boats could pass through and did this work for 30 years.     Disabled: yes: date disability began: , due to: CHF.    Monthly Income:  Pt's SSDI: $1672.00  Wife's SS care home: $1200.00    Able to afford all costs now and if transplanted or receives LVAD, including medications: yes, however pt reports that both his Entresto and Fenofibrate are each $47/month. Pt aware that he can work with PAP coordinator at [facility] who can initiate a financial assistance application in case patient is eligible to receive the medication for a lower price or for free. SW reached out to PAP coordinator Kinza Damian and provided her with pt's information.     Pt reports secure power source? yes  Pt reports ability to afford monthly electric bill? yes  Pt reports ability to afford LVAD dressing supplies? yes  Patient and Caregiver verbalizes understanding of personal responsibilities related to LVAD and transplant costs and the importance of having a financial plan to ensure that patients LVAD and transplant costs are fully covered.       provided fundraising information/education.  Patient and Caregiver verbalizes understanding.   remains available.    Insurance:   Payor/Plan Subscr  Sex Relation Sub. Ins. ID Effective Group Num   1. MEDICARE - ME* ELANA ACEVEDO 1955 Male  [id number]                                    [address]     Primary Insurance (for UNOS reporting):  Public Insurance - Medicare FFS (Fee For Service)  Secondary Insurance (for UNOS reporting): None    Pt reports that he is in the process of changing his insurance from straight Medicare (A,B,D) to [employer] (Managed Medicare Plan). Pt and wife planning to meet with , Vivi Ayoub, later this afternoon to further discuss.      Patient and Caregiver verbalizes clear understanding that patient may experience difficulty obtaining and/or be denied insurance coverage post-surgery. This includes and is not limited to disability insurance, life insurance, health insurance, burial insurance, long term care insurance, and other insurances.      Patient and Caregiver also reports understanding that future health concerns   related to or unrelated to LVAD or transplantation may not be covered by patient's insurance.  Resources and information provided and reviewed.      Patient and Caregiver provides verbal permission to release any necessary information to outside resources for patient care and discharge planning.  Resources and information provided are reviewed.      Dialysis: none  Infusion Service: patient utilizing? no  Home Health: patient utilizing? no  DME: no  Pulmonary/Cardiac Rehab: none   ADLS:  Pt reports that he is fully independent and driving. Pt does report getting tired easily.     Adherence:  Pt and wife report that pt has good adherence to medical appts, medication regimen, and low sodium, cardiac diet.  Adherence education and counseling provided.     Per History Section:  Past Medical History:   Diagnosis Date    CHF (congestive heart failure)     Coronary artery disease     Diabetes mellitus     Hypertension     Kidney stones      Social History     Tobacco Use    Smoking status: Current Some Day Smoker     Types: Cigarettes   Substance Use Topics    Alcohol use: No     Social History     Substance and Sexual Activity   Drug Use Not on file     Social History      Substance and Sexual Activity   Sexual Activity Not on file       Per Today's Psychosocial:  Tobacco: none, patient denies any use. Pt reports that he quit smoking cigarettes about a month ago. Pt reports that he was only smoking one cigarette a day. Pt states that he began smoking cigarettes as a teenager and was smoking more cigarettes per day at that time. Pt aware that he will need to abstain from tobacco for 6 months prior to being considered for OHT.  Alcohol: Pt reports occasional alcohol use, with preference being a frozen Asya.  Illicit Drugs/Non-prescribed Medications: none, patient denies any use. Pt reports that he has a hx of marijuana and cocaine use in 1160-3798 due to stress and depression related to the death of close family members around that time. Pt reports that he voluntarily placed himself into a 30 day residential program in [place], [place] in 2007. Pt states that he had already quit using, but wanted some reinforcement. Pt reports no relapse since that time.     Patient and Caregiver states clear understanding of the potential impact of substance use as it relates to LVAD and transplant candidacy and is aware of possible random substance screening.  Substance abstinence/cessation counseling, education and resources provided and reviewed.     Arrests/DWI/Treatment/Rehab: yes. Pt reports attending a 30 day rehab program for substance abuse in 2007 due to cocaine and marijuana use. Pt reports that he voluntarily attended and completed the program.     Psychiatric History:    Mental Health: depression related to life events that mostly include the passing of family members and friends. Pt reports that he last felt depressed four years ago when his brother passed away.  Psychiatrist/Counselor: none  Medications:  none  Suicide/Homicide Issues: Pt reports no current or past SI/HI.   Safety at home: Pt and wife report no current or past safety issues in the home.     Knowledge: Patient  and Caregiver states having clear understanding and realistic expectations regarding the potential risks and potential benefits LVAD implantation and organ transplantation and organ donation and agrees to discuss with health care team members and support system members, as well as to utilize available resources and express questions and/or concerns in order to further facilitate the pt informed decision-making.  Resources and information provided and reviewed.     Patient and Caregiver is aware of [facility]'s affiliation and/or partnership with agencies in home health care, LTAC, SNF, [facility] and other hospitals and clinics.    Understanding: Patient and Caregiver reports having a clear understanding of the many lifetime commitments involved with being an LVAD and transplant recipient, including costs, compliance, medications, lab work, procedures, appointments, concrete and financial planning, preparedness, timely and appropriate communication of concerns, abstinence (ETOH, tobacco, illicit non-prescribed drugs), adherence to all health care team recommendations, support system and caregiver involvement, appropriate and timely resource utilization and follow-through, mental health counseling as needed/recommended, and patient and caregiver responsibilities.  Social Service Handbook, resources and detailed educational information provided and reviewed.  Educational information provided.    Patient and Caregiver also reports current and expected compliance with health care regime and states having a clear understanding of the importance of compliance.       Patient and Caregiver reports a clear understanding that risks and benefits may be involved with LVAD heart failure treatment and organ transplantation and with organ donation.     Patient and Caregiver also reports clear understanding that psychosocial risk factors may affect patient, and include but are not limited to feelings of depression, generalized anxiety,  anxiety regarding dependence on others, post traumatic stress disorder, feelings of guilt and other emotional and/or mental concerns, and/or exacerbation of existing mental health concerns.  Detailed resources provided and discussed.      Patient and Caregiver agrees to access appropriate resources in a timely manner as needed and/or as recommended, and to communicate concerns appropriately.     Patient and Caregiver also reports a clear understanding of treatment options available.      Feelings or Concerns: Pt reports that he wonders about longevity with an LVAD. Pt states that his brother only lived one year after implant, however pt aware that this is not the average. Pt to inquire further with LVAD nurse coordinators.     Coping: Identify Patient & Caregiver Strategies to [place]:   1. Currently & Pre-transplant - Pt reports that he utilizes his Muslim lola, spends time with wife and family, and watches television.    2. At the time of surgery - Same as above   3. During post-Transplant & Recovery Period - Same as above    Goals: Pt reports that he enjoys fishing and looks forward to returning to this once he has more energy. Pt states that it brings him peace and enjoyment.    Interview Behavior: Patient and Caregiver presents as alert and oriented x 4, pleasant, good eye contact, well groomed, recall good, concentration/judgement good, average intelligence, calm, communicative, cooperative and asking and answering questions appropriately.         Transplant Social Work - Candidacy  Assessment/Plan:     Psychosocial Suitability: Patient presents as a suitable candidate for LVAD at this time. Based on psychosocial risk factors, patient presents as medium risk, due to limited finances and recent tobacco use (less than 6 months). Protective factors include supportive primary caregiver, adequate health insurance for LVAD (currently or when he switches to PHN), transportation, motivation, and insight. In addition,  pt has no major mental health or substance abuse issues. Pt reports commitment to fully abstaining from smoking cigarettes.     Patient presents as a not suitable candidate for Heart Transplant at this time. Pt will need to bring a backup caregiver to meet with SW in clinic, will need to show 6 months of abstinence from tobacco, and will need to fully transition health insurance to [facility] (he is already enrolled but it has not yet become active) as pt does not have a supplement to his straight Medicare at this time.     Recommendations/Additional Comments:     SW will need to meet with pt's sister, Desi Blanco, in clinic, as pt has identified her as his backup caregiver. This is required in order for pt to be an eligible candidate for OHT.    Pt aware that he will need to remain tobacco free for at least 6 months in order to be an OHT candidate.    Pt aware that his [facility] health insurance will need to be active before pt can be an OHT candidate. Pt currently has straight Medicare A,B,D and no supplement.     Pt and wife aware of fundraising options for misc costs not covered by health insurance (food, gas, lodging). Wife aware that there are no overnight visitors at the hospital right now due to COVID.     Discussed local lodging at [facility] should pt be a candidate for Heart Transplant.         Pricilla Ferguson LCSW

## 2020-11-17 ENCOUNTER — OFFICE VISIT (OUTPATIENT)
Dept: CARDIOTHORACIC SURGERY | Facility: CLINIC | Age: 65
End: 2020-11-17
Payer: MEDICARE

## 2020-11-17 ENCOUNTER — CLINICAL SUPPORT (OUTPATIENT)
Dept: INFECTIOUS DISEASES | Facility: CLINIC | Age: 65
End: 2020-11-17
Payer: MEDICARE

## 2020-11-17 ENCOUNTER — OFFICE VISIT (OUTPATIENT)
Dept: INFECTIOUS DISEASES | Facility: CLINIC | Age: 65
End: 2020-11-17
Payer: MEDICARE

## 2020-11-17 ENCOUNTER — HOSPITAL ENCOUNTER (OUTPATIENT)
Dept: RADIOLOGY | Facility: HOSPITAL | Age: 65
Discharge: HOME | End: 2020-11-17
Attending: INTERNAL MEDICINE
Payer: MEDICARE

## 2020-11-17 ENCOUNTER — NUTRITION (OUTPATIENT)
Dept: TRANSPLANT | Facility: CLINIC | Age: 65
End: 2020-11-17
Payer: MEDICARE

## 2020-11-17 ENCOUNTER — EDUCATION (OUTPATIENT)
Dept: TRANSPLANT | Facility: CLINIC | Age: 65
End: 2020-11-17

## 2020-11-17 ENCOUNTER — TELEPHONE (OUTPATIENT)
Dept: TRANSPLANT | Facility: CLINIC | Age: 65
End: 2020-11-17

## 2020-11-17 VITALS
DIASTOLIC BLOOD PRESSURE: 66 MMHG | WEIGHT: 183.88 LBS | HEIGHT: 73 IN | BODY MASS INDEX: 24.37 KG/M2 | TEMPERATURE: 98 F | OXYGEN SATURATION: 100 % | HEART RATE: 76 BPM | SYSTOLIC BLOOD PRESSURE: 119 MMHG

## 2020-11-17 VITALS
TEMPERATURE: 98 F | HEART RATE: 73 BPM | DIASTOLIC BLOOD PRESSURE: 61 MMHG | HEIGHT: 73 IN | SYSTOLIC BLOOD PRESSURE: 102 MMHG | BODY MASS INDEX: 24.37 KG/M2 | WEIGHT: 183.88 LBS

## 2020-11-17 VITALS — WEIGHT: 181.69 LBS | BODY MASS INDEX: 24.08 KG/M2 | HEIGHT: 73 IN

## 2020-11-17 DIAGNOSIS — I42.8 NICM (NONISCHEMIC CARDIOMYOPATHY): Primary | ICD-10-CM

## 2020-11-17 DIAGNOSIS — I50.9 HEART FAILURE, UNSPECIFIED HF CHRONICITY, UNSPECIFIED HEART FAILURE TYPE: Primary | ICD-10-CM

## 2020-11-17 DIAGNOSIS — I50.9 HEART FAILURE, UNSPECIFIED HF CHRONICITY, UNSPECIFIED HEART FAILURE TYPE: ICD-10-CM

## 2020-11-17 DIAGNOSIS — I42.8 NICM (NONISCHEMIC CARDIOMYOPATHY): ICD-10-CM

## 2020-11-17 DIAGNOSIS — I10 ESSENTIAL HYPERTENSION: ICD-10-CM

## 2020-11-17 DIAGNOSIS — Z76.82 HEART TRANSPLANT CANDIDATE: Primary | ICD-10-CM

## 2020-11-17 DIAGNOSIS — Z76.82 ORGAN TRANSPLANT CANDIDATE: Primary | ICD-10-CM

## 2020-11-17 DIAGNOSIS — Z71.85 VACCINE COUNSELING: ICD-10-CM

## 2020-11-17 DIAGNOSIS — Z01.818 PRE-TRANSPLANT EVALUATION FOR HEART TRANSPLANT: ICD-10-CM

## 2020-11-17 DIAGNOSIS — E78.2 MIXED HYPERLIPIDEMIA: ICD-10-CM

## 2020-11-17 DIAGNOSIS — Z76.82 ORGAN TRANSPLANT CANDIDATE: ICD-10-CM

## 2020-11-17 DIAGNOSIS — N28.1 RENAL CYST: ICD-10-CM

## 2020-11-17 DIAGNOSIS — Z76.82 HEART TRANSPLANT CANDIDATE: ICD-10-CM

## 2020-11-17 DIAGNOSIS — E11.9 TYPE 2 DIABETES MELLITUS WITHOUT COMPLICATION, WITHOUT LONG-TERM CURRENT USE OF INSULIN: ICD-10-CM

## 2020-11-17 PROCEDURE — 99999 PR PBB SHADOW E&M-EST. PATIENT-LVL III: ICD-10-PCS | Mod: PBBFAC,TXP,, | Performed by: THORACIC SURGERY (CARDIOTHORACIC VASCULAR SURGERY)

## 2020-11-17 PROCEDURE — 99999 PR PBB SHADOW E&M-EST. PATIENT-LVL I: CPT | Mod: PBBFAC,TXP,, | Performed by: DIETITIAN, REGISTERED

## 2020-11-17 PROCEDURE — 99213 OFFICE O/P EST LOW 20 MIN: CPT | Mod: PBBFAC,TXP,25 | Performed by: THORACIC SURGERY (CARDIOTHORACIC VASCULAR SURGERY)

## 2020-11-17 PROCEDURE — 90715 TDAP VACCINE 7 YRS/> IM: CPT | Mod: PBBFAC,TXP

## 2020-11-17 PROCEDURE — G0009 ADMIN PNEUMOCOCCAL VACCINE: HCPCS | Mod: PBBFAC,TXP

## 2020-11-17 PROCEDURE — G0010 ADMIN HEPATITIS B VACCINE: HCPCS | Mod: PBBFAC,TXP

## 2020-11-17 PROCEDURE — 90750 HZV VACC RECOMBINANT IM: CPT | Mod: PBBFAC,TXP

## 2020-11-17 PROCEDURE — 99212 OFFICE O/P EST SF 10 MIN: CPT | Mod: PBBFAC,27,TXP,25

## 2020-11-17 PROCEDURE — 99999 PR PBB SHADOW E&M-EST. PATIENT-LVL IV: ICD-10-PCS | Mod: PBBFAC,TXP,, | Performed by: INTERNAL MEDICINE

## 2020-11-17 PROCEDURE — 99205 PR OFFICE/OUTPT VISIT, NEW, LEVL V, 60-74 MIN: ICD-10-PCS | Mod: S$PBB,TXP,, | Performed by: INTERNAL MEDICINE

## 2020-11-17 PROCEDURE — 99999 PR PBB SHADOW E&M-EST. PATIENT-LVL II: CPT | Mod: PBBFAC,TXP,,

## 2020-11-17 PROCEDURE — 97802 MEDICAL NUTRITION INDIV IN: CPT | Mod: PBBFAC,TXP | Performed by: DIETITIAN, REGISTERED

## 2020-11-17 PROCEDURE — 90472 IMMUNIZATION ADMIN EACH ADD: CPT | Mod: PBBFAC,TXP

## 2020-11-17 PROCEDURE — 99211 OFF/OP EST MAY X REQ PHY/QHP: CPT | Mod: PBBFAC,25,27,TXP | Performed by: DIETITIAN, REGISTERED

## 2020-11-17 PROCEDURE — 76770 US RETROPERITONEAL COMPLETE: ICD-10-PCS | Mod: 26,TXP,, | Performed by: RADIOLOGY

## 2020-11-17 PROCEDURE — 99214 OFFICE O/P EST MOD 30 MIN: CPT | Mod: PBBFAC,25,27,TXP | Performed by: INTERNAL MEDICINE

## 2020-11-17 PROCEDURE — 99999 PR PBB SHADOW E&M-EST. PATIENT-LVL II: ICD-10-PCS | Mod: PBBFAC,TXP,,

## 2020-11-17 PROCEDURE — 99999 PR PBB SHADOW E&M-EST. PATIENT-LVL I: ICD-10-PCS | Mod: PBBFAC,TXP,, | Performed by: DIETITIAN, REGISTERED

## 2020-11-17 PROCEDURE — 99205 OFFICE O/P NEW HI 60 MIN: CPT | Mod: S$PBB,TXP,, | Performed by: INTERNAL MEDICINE

## 2020-11-17 PROCEDURE — 99205 OFFICE O/P NEW HI 60 MIN: CPT | Mod: S$PBB,TXP,, | Performed by: THORACIC SURGERY (CARDIOTHORACIC VASCULAR SURGERY)

## 2020-11-17 PROCEDURE — 99999 PR PBB SHADOW E&M-EST. PATIENT-LVL IV: CPT | Mod: PBBFAC,TXP,, | Performed by: INTERNAL MEDICINE

## 2020-11-17 PROCEDURE — 99999 PR PBB SHADOW E&M-EST. PATIENT-LVL III: CPT | Mod: PBBFAC,TXP,, | Performed by: THORACIC SURGERY (CARDIOTHORACIC VASCULAR SURGERY)

## 2020-11-17 PROCEDURE — 76770 US EXAM ABDO BACK WALL COMP: CPT | Mod: 26,TXP,, | Performed by: RADIOLOGY

## 2020-11-17 PROCEDURE — 90694 VACC AIIV4 NO PRSRV 0.5ML IM: CPT | Mod: PBBFAC,TXP

## 2020-11-17 PROCEDURE — 99205 PR OFFICE/OUTPT VISIT, NEW, LEVL V, 60-74 MIN: ICD-10-PCS | Mod: S$PBB,TXP,, | Performed by: THORACIC SURGERY (CARDIOTHORACIC VASCULAR SURGERY)

## 2020-11-17 PROCEDURE — 76770 US EXAM ABDO BACK WALL COMP: CPT | Mod: TC,TXP

## 2020-11-17 PROCEDURE — G0008 ADMIN INFLUENZA VIRUS VAC: HCPCS | Mod: PBBFAC,TXP

## 2020-11-17 NOTE — PROGRESS NOTES
EDUCATION NOTE:    Rocco France was seen today for pre-heart transplant education.  Patient signed wellness contract and informed consent to undergo heart transplant evaluation work-up.  Thorough pre-transplant education conducted.      Information presented included:  · Evaluation process  · Members of the transplant team  · Selection committee members and role of the committee  · Listing process for transplant  · Different listing designations, including status 7  · 1-year graft survival statistics  · LVAD as bridge to transplant or DT  · Need to reach patient within 15 minutes of donor offer  · CDC high risk donors  · Blood transfusions  · Process for matching donor with recipient  · Need for weight loss and how it relates to the wait time  · Post-transplant immunosuppression for life with need to be able to afford post-transplant medications  · Need for a caregiver to be with them at all times beginning with discharge from ICU, through at least the first 6 weeks post-transplant  · Need to find local housing for the first 6 weeks post-transplant  · How to reach team members at any time  · UNOS website with written instructions regarding how to look up information specific to [facility] transplant program  · Use of Hepatitis C organs    Patient was accompanied by wife, Meme.  Patient asked pertinent questions, which were answered to their satisfaction.  Patient was also given a copy of the wellness contract and informed consent to undergo evaluation work-up.

## 2020-11-17 NOTE — PROGRESS NOTES
Patient received 2 vaccine IM to the left deltoid, 1st Heplisav B post anterior and 1st Shingrix anterior.  Patient also received 3 Vaccinec IM to the right deltoid, Tdap anterior, HD Flu middle and Prevnar post anterior.  Tolerated well and left in NAD

## 2020-11-17 NOTE — LETTER
November 17, 2020      Mike Chauhan MD  [address]  [facility] [address]           [facility] - Infectious Disease 1st Fl  [address]  [facility] [address]  Phone: [phone]  Fax: [phone]          Patient: Rocco France   MR Number: [id number]   YOB: 1955   Date of Visit: 11/17/2020       Dear Dr. Mike Chauhan:    Thank you for referring Rocco France to me for evaluation. Attached you will find relevant portions of my assessment and plan of care.    If you have questions, please do not hesitate to call me. I look forward to following Rocco France along with you.    Sincerely,    Montserrat Schwarz MD    Enclosure  CC:  No Recipients    If you would like to receive this communication electronically, please contact [email or web address] or [phone] to request more information on [employer] Link access.    For providers and/or their staff who would like to refer a patient to [facility], please contact us through our one-stop-shop provider referral line, [facility], at [phone].    If you feel you have received this communication in error or would no longer like to receive these types of communications, please e-mail [email or web address]

## 2020-11-17 NOTE — LETTER
November 17, 2020      Deana Lake MD  [address]  [facility] [address]           [facility] - Cardiovasc Surg 2nd Fl  [address]  [facility] [address]  Phone: [phone]          Patient: Rocco France   MR Number: [id number]   YOB: 1955   Date of Visit: 11/17/2020       Dear Dr. Deana Lake:    Thank you for referring Rocco France to me for evaluation. Attached you will find relevant portions of my assessment and plan of care.    If you have questions, please do not hesitate to call me. I look forward to following Rocco France along with you.    Sincerely,    David Akbar MD    Enclosure  CC:  No Recipients    If you would like to receive this communication electronically, please contact [email or web address] or [phone] to request more information on [employer] Link access.    For providers and/or their staff who would like to refer a patient to [facility], please contact us through our one-stop-shop provider referral line, [facility], at [phone].    If you feel you have received this communication in error or would no longer like to receive these types of communications, please e-mail [email or web address]

## 2020-11-17 NOTE — PROGRESS NOTES
TRANSPLANT NUTRITIONAL ASSESSMENT    Referring Provider: Deana Lake MD    Reason for Visit: Pre-heart transplant work-up    Age: 65 y.o.  Sex: male    Patient Active Problem List   Diagnosis    AICD (automatic cardioverter/defibrillator) present    NICM (nonischemic cardiomyopathy)    Leg pain, left    Essential hypertension    Type 2 diabetes mellitus without complication    Hyperlipidemia    Organ transplant candidate    Heart failure     Past Medical History:   Diagnosis Date    CHF (congestive heart failure)     Coronary artery disease     Diabetes mellitus     Hypertension     Kidney stones      Lab Results   Component Value Date     (H) 11/16/2020     (H) 11/16/2020    K 4.0 11/16/2020    K 4.0 11/16/2020    MG 1.7 11/16/2020    CHOL 160 11/16/2020    HDL 44 11/16/2020    TRIG 103 11/16/2020    ALBUMIN 3.4 (L) 11/16/2020    ALBUMIN 3.4 (L) 11/16/2020    PREALBUMIN 11 (L) 11/16/2020    HGBA1C 7.4 (H) 11/16/2020    CALCIUM 9.2 11/16/2020    CALCIUM 9.2 11/16/2020     Other Pertinent Labs: none    Current Outpatient Medications   Medication Sig    aspirin (ECOTRIN) 81 MG EC tablet Take 81 mg by mouth once daily.    atorvastatin (LIPITOR) 20 MG tablet Take 1 tablet (20 mg total) by mouth once daily. (Patient taking differently: Take 80 mg by mouth once daily. )    carvediloL (COREG) 6.25 MG tablet Take 6.25 mg by mouth 2 (two) times daily.    clopidogreL (PLAVIX) 75 mg tablet Take 75 mg by mouth once daily.    fenofibrate 160 MG Tab Take 160 mg by mouth once daily.    furosemide (LASIX) 40 MG tablet Take 40 mg by mouth once daily.    glimepiride (AMARYL) 4 MG tablet Take 4 mg by mouth.    metFORMIN (GLUCOPHAGE) 500 MG tablet Take 500 mg by mouth 2 (two) times daily with meals.    nitroGLYCERIN (NITROSTAT) 0.4 MG SL tablet Place 0.4 mg under the tongue.    pantoprazole (PROTONIX) 40 MG tablet Take 40 mg by mouth once daily.    sacubitriL-valsartan (ENTRESTO) 24-26 mg per  "tablet Take 1 tablet by mouth 2 (two) times daily.    spironolactone (ALDACTONE) 25 MG tablet Take 25 mg by mouth once daily.     No current facility-administered medications for this visit.      Allergies: Pcn [penicillins]    Ht Readings from Last 1 Encounters:   11/17/20 6' 0.68" (1.846 m)     Wt Readings from Last 1 Encounters:   11/17/20 82.4 kg (181 lb 10.5 oz)      BMI: Body mass index is 24.18 kg/m².    Usual Weight: 184-197 lb  Weight Change/Time: weighed 220 lb last year, diuresis attributed to weight loss, some changes in diet  Current Diet: regular/low sodium  Appetite/Current Intake: good   Exercise/Physical Activity: functional in ADL's, walking throughout the day, can grocery shop/cook  Nutritional/Herbal Supplements: none  Potential Food/Medication Interactions: reviewed  Chewing/Swallowing Problems: none  Symptoms: none  Assessment of Lab Values: Glu 122, Ha1c 7.4, Alb 3.4, Prealb 11  Support System: none present, pt voices that spouse is supportive in nutrition/diet regimen, states spouse "had a heart transplant"    Estimated Kcal Need: 4518-8383 kcal (30-35 kcal/kg)  Estimated Protein Need: 82-98 gm (1-1.2 gm/kg)    Nutritional History:   Pt reports appetite is good, no n/v/d/adb pain/constipation. Pt and spouse both grocery shop and cook. Pt has reduced sodium intake with looking and seasonings and trying salt-free blends. Pt has some canned vegetables at times, some packaged snacks. He reported the following diet recall:  B: cereal w/ 2% milk or grits & 2 eggs, orange juice  L: grilled chicken & potatoes / beans w/sausage / gumbo / homemade soups like tomato or vegetable  D: sweet peas/carrots/green beans/mixed broccoli & cauliflower (frozen), baked fish, occasional fried fish, beef meatballs & spaghetti, homemade burger, baked chicken, rice  Snack: ice cream or cookies (not as late at night as he used to eat, smaller portion)  Beverages: 1-2 8 oz bottle of water, 1c orange juice (following " fluid restriction direction from physician)  Nutritional Diagnoses  Problem: food- and nutrition-related knowledge deficit  Etiology: r/t no prior edu on low sodium label reading/protein intake recommendations  Symptoms: aeb diet recall, questions    Educational Need? yes  Barriers: none identified  Discussed with: patient  Interventions: Patient taught nutrition information regarding Pre-heart transplant work-up.    Heart Failure Nutrition Therapy pack reviewed and provided.  Encouraged physical activity daily, regular exercise as tolerated, stay mobile.  Encouraged lean /low sodium sources of protein throughout the day.  Goals/Recommendations: diet adherence and small frequent meals and snacks  Actions Taken: instruct/provide written information  Strategies Used: problem solving, goal setting, motivational interviewing  Patient and/or family comprehend instructions: yes , adherence expected  Outcome: Verbalizes understanding  Monitoring:     Contact information provided, will f/u in clinic and communicate with the care team as needed.     Counseling Time: 30 minutes

## 2020-11-17 NOTE — PROGRESS NOTES
Pre Transplant Infectious Diseases Consult  Heart Transplant Recipient Evaluation    Requesting Physician: Gissel    Reason for Visit:  Pre-transplant evaluation    History of Present Illness  65 y.o. male with history of stage C HFrEF (25%), NICMP s/p ICD, HTN, HLD, DM2, presents for evaluation of heart transplant.     Patient denies any recent fever, chills, or infective illnesses.    1) Do you have a history of:         YES NO   Autoimmune disease  []        [x]   Cancer              []        [x]   Surgical Removal of Spleen []        [x]       2) Have you had any serious infections in the last year?   No        3)Have you ever had: YES     NO       Chicken Pox   [x]         []          Shingles   []         [x]              4) Tuberculosis             YES NO  Exposure to person with active TB?  []         [x]     5) Travel    What states have you lived in for more than a month?  [place]  [place]    What countries have you visited?      None                      6) Animal Exposure                  YES NO  Do you have pets living in your house?   []         [x]   If yes, describe:     Do you spend time or live on a farm?    []         [x]   If yes, which ones:    Do you fish or hunt?      [x]         []     Consume raw or undercooked meat, fish, shellfish?  []         [x]       7) What occupations have you had?          8) Hobbies                   YES     NO  Do you garden or otherwise work in the soil?   []         [x]   Do you hike, camp, or spend time in wooded areas?  []         [x]       9) The patient's immunization history was reviewed.     Have you ever received:  YES NO DATES  Routine Childhood vaccines  [x]         []       Influenza vaccine this year  []         [x]     Prevnar    []         [x]     Pneumovax    []         [x]     Tetanus-diptheria -pertussis  []         [x]     Hepatitis A vaccine series       []         []  Immune   Hepatitis B vaccine series         []         [x]      Shingles  vaccine   []         [x]            Review of Systems   Constitution: Positive for weight loss. Negative for chills, decreased appetite, fever, malaise/fatigue, night sweats and weight gain.   HENT: Negative for congestion, ear pain, hearing loss, hoarse voice, sore throat and tinnitus.    Eyes: Positive for blurred vision. Negative for redness and visual disturbance.   Cardiovascular: Positive for leg swelling. Negative for chest pain and palpitations.   Respiratory: Negative for cough, hemoptysis, shortness of breath and sputum production.    Hematologic/Lymphatic: Negative for adenopathy. Does not bruise/bleed easily.   Skin: Negative for dry skin, itching, rash and suspicious lesions.   Musculoskeletal: Negative for back pain, joint pain, myalgias and neck pain.   Gastrointestinal: Positive for diarrhea. Negative for abdominal pain, constipation, heartburn, nausea and vomiting.   Genitourinary: Positive for dysuria. Negative for flank pain, frequency, hematuria, hesitancy and urgency.   Neurological: Positive for paresthesias. Negative for dizziness, headaches, numbness and weakness.   Psychiatric/Behavioral: Negative for depression and memory loss. The patient does not have insomnia and is not nervous/anxious.      Objective:   Physical Exam  Vitals signs reviewed.   Constitutional:       General: He is not in acute distress.     Appearance: He is well-developed. He is not diaphoretic.   HENT:      Head: Normocephalic and atraumatic.   Eyes:      Conjunctiva/sclera: Conjunctivae normal.   Neck:      Musculoskeletal: Normal range of motion and neck supple.   Pulmonary:      Effort: Pulmonary effort is normal. No respiratory distress.   Abdominal:      General: There is no distension.      Palpations: Abdomen is soft.   Musculoskeletal: Normal range of motion.   Skin:     General: Skin is warm and dry.      Findings: No erythema or rash.   Neurological:      Mental Status: He is alert and oriented to person,  place, and time.   Psychiatric:         Behavior: Behavior normal.       Significant Labs Reviewed:    HepA Ab POS    HepBc Ab neg  HepBs Ag neg  HepBs Ab neg    HepC Ab neg    HIV neg  RPR neg    Pending Labs:  Quant gold   Strongy     Assessment     - Organ transplant candidate  - Vaccine counseling    Plan:     Transplant Infectious Disease - Candidacy:   Based on available information, there are no identified significant barriers to transplantation from an infectious disease standpoint pending acceptable serologies.    - Quant gold  - Strongy    Final determination of transplant candidacy will be made once evaluation is complete and reviewed by the Transplant Selection Committee.      Counseling:  I discussed with the patient the risk for increased susceptibility to infections following transplantation including increased risk for infection right after transplant and if rejection should occur.    Specific guidance has been provided to the patient regarding the patients occupation, hobbies and activities to avoid future infectious complications including but not limited to avoiding undercooked meats and seafood, proper hygiene, and contact with animals.  The patients has been counseled on the importance of vaccinations including but not limited to a yearly flu vaccine.    Immunizations:  Based on the patients immunization history and serologies, immunizations were ordered:  - Influenza vaccine  - Prevnar  - Pneumovax 2 months  - TDaP  - Heplisav (Hepatitis B) 0, 1 months  - Shingrix 0, 2 months        The patient was encouraged to contact us about any problems that may develop after immunization and possible side effects were reviewed.     Montserrat Schwarz MD MPH  Infectious Diseases [facility]

## 2020-11-17 NOTE — PROGRESS NOTES
Pt and wife JORDI. Patient and caregiver have read this VAD education.  I have reviewed the contents of this in detail and all questions have been answered to patient and caregiver's satisfaction as evidence by verbal acknowledgement. When I asked if they wanted to meet a VAD pt both replied that his brother had a VAD where he was and lived well for about a year, but  from a stroke.  They said no need to meet a pt right now.      Verbal and written VAD Education:     Please carefully read and review the following statement, and, sign to indicate you have been fully informed about the candidacy and evaluation process for the mechanical circulatory support device (MCSD) also known as the Ventricular Assist Device (VAD).     Why a VAD Is Needed   Heart failure is defined as a condition in which your heart is unable to pump enough blood to support the basic needs of your body. This can make you feel tired, have abnormal rhythms, and shortness of breath. Heart failure can also lead to failure of other organs (e.g. liver or kidneys). You are being offered this treatment option because you have a marked increase risk of irreversible end-organ damage or death. For this reason, you are being considered for placement of a Left Ventricular Assist Device (VAD) at [facility]. The heart pump is designed to take over the pumping action of your heart.   Prior to undergoing this procedure, it is important that you and your family understand the options, benefits, risks, and expectations associated with having a VAD. It is required that you and your proposed caregiver(s) understand and agree with the treatment plan and are willing to participate in the guidelines outlined in the following pages.     Bridge to transplant (BTT) is when a VAD is used to help support heart function for someone waiting for a heart transplant. This treatment plan is subject to change pending the results from your evaluation and your  physicians decision. If your medical condition worsens after you receive the VAD, you may not be a transplant candidate.   The information within this document pertains only to VAD therapy. You will receive information regarding heart transplantation allocation, procedures, and risks from the Pre-Transplant when appropriate.   OR   Destination therapy is when a VAD is used as a long-term treatment for patients who are not candidates for transplant, such as those with end-stage congestive heart failure. In these patients, the pumps are placed permanently to help the heart work better. This is subject to change pending the results from your evaluation and your physicians decision.     Types of Ventricular Assist Devices:   There are several different types of mechanical circulatory support devices:   -Left ventricular assist devices (LVAD) help the left side of the heart pump blood to the largest artery of the body, the aorta.     Your VAD Team may also talk with you about:   -Right ventricular assist devices (RVAD) help the right side of the heart pump blood to the lungs.   -Total Artificial Heart (SERGEI) that replaces the heart and pumps blood to the body.   A VAD is a long-term device utilized by patients for months to years. Some patients may receive another device prior to receiving a VAD depending on their treatment and health status.   When Is a VAD Used?   When medications can no longer help, and other surgical options have been exhausted, a physician may recommend a VAD. A VAD is used to assist the pumping action of a severely weakened heart. It works with your heart to improve and to increase blood flow; it does not replace your own heart. VADs are most often used for patients experiencing New York Heart Association (NYHA) Class III-IV heart failure symptoms.     Alternative Options:   If you are not found to be a candidate for VAD implantation or if you decide that a VAD is not the best option for you, you  will continue to receive customary standard of care. You may also continue optimal medical management alone including the use of inotrope therapy. An inotrope is an IV medication that helps the strength of the hearts contraction. However, the reason that you are being considered for VAD implantation is because optimal medical management has not been adequate and without a VAD, your condition is likely to deteriorate over time. While going straight to transplant may be a possibility, death is a possibility as well.   You May Not Be Eligible To Receive A VAD If You Are Found To Have Any Of The Following:      Any irreversible non-cardiac disease state with less than 2-year survival rate    Inadequate social support to be successful at home after surgery    Irreversible pulmonary disease or fixed pulmonary hypertension    Irreversible renal disease    Irreversible liver disease    Unresolved stroke or uncorrected cerebral vascular disease    A history of chronic noncompliance    Using illicit drugs or alcohol    Uncorrected thyroid disease    Significant right ventricular failure    Obstructive or restrictive cardiomyopathy    Amyloidosis    Active pericardial disease    Untreated aortic aneurysm    Irreversible cognitive dysfunction    History of psychiatric disease, uncontrolled affective disorder, or any cognitive dysfunction that may prevent you from managing self-care    Diabetes with severe retinopathy or peripheral neuropathy    Obesity with BMI (body mass index) greater than 35    Severe chronic malnutrition    Uncorrected blood disorders    Active uncontrolled infection    Pregnancy (positive pregnancy test)    HIV positive or immunosuppression that could result in device infection    Muscular dystrophy, MS or similar disease states    Active systemic infection    Cancer    Insufficient funding     Possible Benefits:   The overall goal is improved health and quality of life. In most  cases, because circulation has been restored as a result of the VAD, you can expect to have more energy and also experience less heart failure symptoms. Since VADs help deliver more oxygen rich blood, you may feel well enough to resume many of the usual activities and hobbies that you enjoy. In fact, many patients return to work and are no longer disabled, depending on the type of work they do. Be aware that you may lose your disability post VAD based on review of your records and disability benefits. It is important that you speak with a  so that you may plan for this should it occur.   The improved circulation may prolong life and may improve some organ damage caused by your heart failure. This is supported by some studies that have shown that VAD patients have a longer survival than patients treated with medications alone. Although the VAD can improve your chances for survival, the type and severity of your heart disease may outweigh any benefits from the device and you may still die.     Possible Surgical/ Anesthesia Risks:   As with any surgery or procedure, there are risks and the possibility of complications or death. There are also risks related to the operation itself and undergoing anesthesia, and risks related to the device itself. You will discuss the risks in detail with the Cardiac Surgeon who intends to perform your surgery. Your surgeon and anesthesia provider will review consent forms prior to surgery.     Operative Procedure Risks:   There are many risks with this operation including but not limited to: death, heart attack, stroke, nerve injury, blood clots, damage to arteries needing limb amputation, bleeding and hemorrhage, hemolysis, infection, development of new antibodies in your blood, mediastinitis, arrhythmia, right heart failure, heart block, or the need for pacemaker or ICD implantation. The need for re-operation for any reason may cause: renal, hepatic or  pulmonary failure resulting in death or long-term need of ventilation or dialysis, and blood transfusion with its risk of HIV, and hepatitis. Studies have also shown that patients may have problems with memory, attention, and speed of processing thoughts after a cardiac surgical procedure. Any of these complications will be explained to you in more detail if you desire. In addition to these potential complications, there may be other risks that are currently unknown.     Risks Related to VAD Therapy:   Include but not limited to: death, need for re-operation, device malfunction or device infection, renal failure requiring dialysis, blood clots, stroke, pain, or bleeding. These risks may lead to prolonged hospital stay or re-hospitalization. Pump exchange due to complications is a possibility. Patients may also experience a potential decrease in their quality of life including limitations of their normal activities. Patients may reach a point where quality of life is so impaired, that the decision to terminate VAD-support will need to be addressed. The longer you are on the device, the greater the chances that complications will develop.   Please see addendum for likelihood of these risks impacting you in your VAD therapy journey.     Evaluation Process:   There will be many people involved in the evaluation process to assure that this is the best choice for you. You will receive a number of tests and consultations. Some of the people that may help evaluate you include Heart Failure Physicians, Cardiac Surgeons, Social Workers and VAD Coordinators. During the evaluation process, you will be given education about the VAD and the care you would require. After the evaluation, the group will decide if you meet the criteria to have a VAD implanted. You may require or have already been implanted with a short-term VAD prior to surgery for a long-term VAD.     Care Team   Additionally, you will meet with a number of  different team members to coordinate your care: financial counselor to discuss insurance and dressing supplies, research coordinators, anesthesiologist, psychiatrist/psychologist, physical therapist or occupational therapist to discuss rehabilitation after VAD, and dietician to improve nutrition. Some patients may be referred to other services for consultation. These may include, but are not limited to: infectious disease doctor, gastroenterologist, nephrologist (kidney doctor), pulmonologist (lung doctor), hepatologist (liver doctor), or an ophthalmologist (eye doctor). It is recommended that you see your dentist to ensure no infection is present and all needed dental work is completed before surgery. Cavities and rotten teeth can cause an infection which can lead to death.   Testing is required to determine VAD candidacy. These include but are not limited to the following:   Laboratory studies of blood and urine, chest x-ray, abdominal ultrasound, CT scan, echocardiogram, cardiopulmonary treadmill, right heart catheterization, electrocardiogram, pulmonary function tests, colonoscopy or endoscopy, vascular studies, and pulmonary studies.     Device Choice:   VADs are currently approved by the Food and Drug Administration (FDA) to be used as Bridge to Transplantation (BTT) or Destination Therapy (DT). A full list will be provided for you and your family to review if desired. This [facility] also participates in clinical trials with devices that are considered investigational, and are not yet FDA approved for BTT/DT. Your Surgeon and Cardiologist will discuss with you which device is the best option for you. VADs have four main parts: the implantable heart pump, a tube that passes through the skin of your abdomen (driveline), a controller (small computer) that controls the pump operation, and an external power source (batteries or power device). In addition, there are other VADs that are used temporarily when  patients are in cardiogenic shock.   You have the right to refuse surgery at any time. This educational document consent will help you to make an informed decision about your VAD option. If you decide this is not the best option for you , please make your physician aware. You and your family make the decision to proceed.   Pre-operative Care expectations:    Your surgeon will request a tentative operative date in your name in the event that VAD therapy is the right option defined by you, your family, and provider team. You may see this date on [facility].    Informed surgical and anesthesia consents will be completed prior to the procedure.    You will be admitted to the hospital a few days before the day of surgery for optimization before surgery (unless already hospitalized).    Intra-aortic balloon pump or impella will be placed before surgery    If you are listed for transplant, once you go for the VAD your listing status will change. This will be discussed with you by your Transplant team.    If you have an ICD (defibrillator), it will be turned off prior to surgery, and then turned back on after surgery. It will NOT be removed.     Surgical Procedure:   The surgical procedure to implant the VAD will require open-heart surgery and can take on average between 6-12 hours. The surgeon will need to make an incision down the front of your chest to reach your heart. You will have a breathing tube and ventilator while under general anesthesia. The VAD is placed below the heart and the surgeon will connect the pump to your heart and secure it in place with sutures. Once the pump is in place, the VAD along with your natural heart will resume pumping blood through your body. After the surgery is completed, you will receive care in the ICU.     Post-Operative Care Expectations:   Upon arrival to the ICU, you will receive close monitoring and support from the following medical mechanisms:    Upon arrival to ICU,  please adhere to our ICU (intensive care unit) visiting hours. You will need to rest and we ask that family not stay the night for a few days.    Heart monitor (telemetry) to monitor heart rate and rhythm.    A breathing tube (endotracheal tube) and ventilator to assist with breathing and maintain and open airway.    An oral-gastric tube will be utilized to keep the stomach empty when connected to suction, as well as to give the nursing staff the capability to administer oral medications directly into the stomach.    A Duarte catheter to measure urinary output.    A [place] Catheter to measure pressures within the heart and lungs.    An arterial line catheter in order to measure arterial blood pressure.    Chest tubes to collect and measure drainage from surgery.    A VAD driveline that exits the skin in the abdominal area and is connected to the VAD power source.    Your chest may be left open for 24 to 72 hours after implantation, after which you will be taken back to the operating room to close your chest.     You will receive medications for sedation and to control your pain in order to achieve a tolerable level of comfort. You will also be on IV medications until your blood pressure and fluid status are stable. Your home medications will be resumed as soon as possible if still medically relevant. In addition to your previous taken medications, patients with VADs are commonly prescribed medications for anticoagulation/anti-platelet, antibiotics, blood pressure, and vitamin/mineral supplements. Your length of stay in the ICU will depend on how fast you recover. Once you are more stable, breathing on your own with your lines and tubes out, you will be transferred to a general care unit where you can expect to stay for another 1-3 weeks. On average, your total length of stay will be about three or four weeks after your surgery. During this time, it is expected that you and your family will begin to learn  to manage   the device and learn how to manage your care at home. Most patients are able to return home after VAD implantation, but this cannot be guaranteed. Complications may require a prolonged period of hospitalization, long term acute care facility, or inpatient rehabilitation. Be aware, if you are unattended and the device fails, you may not be able to perform the emergency procedures yourself, which could result in death and/or blood clots in the device.     Education:   Verbal, written, and visual educational materials are provided throughout your hospitalization and are available to anyone involved in your care at home. You and your caregiver(s) will be trained by a VAD Coordinator on how to manage your care and device. Other staff such as your bedside Nurse, the Occupational and Physical Therapists will also provide training to you. You and your caregiver(s) must show ability to manage the device, understand how it operates, troubleshoot problems, and care for your driveline exit site. It is expected that a caregiver(s) will be present and available while you are in the hospital to learn how to manage the device and how to care for you when you are at home. The education will be an ongoing process while you are here at the hospital. Prior to your expected discharge, your family and/or caregivers will be required to show competency in the care and management of you and your VAD. In addition, a VAD Coordinator will ensure that your local fire department, emergency personnel, and any other community members will be given education materials and training as necessary. Your home must have consistent electricity and phone services; the outlets must be three pronged and grounded. Any additional safety needs are arranged during this time. You will need to have your glasses and hearing aids at the hospital in order to be educated, as soon as possible.     Caregiver Requirements:   VAD implantation is based on  suitability, need, and a committed caregiver. The caregiver must agree to certain responsibilities for the VAD implant process to continue. This is not negotiable. The VAD patient may be completely dependent on the caregiver. While the patient is in the hospital, the caregiver is expected to visit daily, preferably at a time between 8 am- 4 pm. The caregiver will need to learn the dressing change process by the nurses and consistently demonstrate the ability to perform VAD dressing change in addition to helping the patient learn about the VAD. If the patient is unable to meet education goals before discharge, they may need 24-hour care. 24-hour caregivers may experience increased stress in their day-to-day life resulting from caring for a loved one with a VAD. There are support groups available to help you through living with a VAD.   The patient will not be able to drive for several months. The caregiver will need to drive the patient to appointments, as frequently as two to three times weekly at first. The caregiver will need to assist the patient with medication management. The caregiver will need to encourage the patient to document VAD numbers daily and know when to call for a problem.   Education begins now and is on-going throughout the VAD patients life.   The VAD team recognizes that VAD patients have an increased satisfactory outcome with a dedicated and committed caregiver. The caregiver has a tremendous responsibility. It is essential for you, the caregiver, to understand what is expected prior to moving forward. Any concerns about being the caregiver should be discussed with the , the VAD coordinator, or the physician.     Discharge Process:   Your daily progress will be followed by a team of people involved in your care including your Surgeon, Cardiologist, VAD Coordinators, Staff Nurses, Nurse Practitioners, Physician Assistants, Physical/Occupational Therapy, Social Workers, and a  Discharge Planner. They will monitor your recovery and help you to adjust to life with a VAD. You will need to demonstrate the ability to care for yourself, such as grooming and exercise. You will also need to demonstrate the ability to care for your VAD, the equipment, and alarms. You must have a thermometer, weight scale, flashlight, and a blood pressure cuff at your home. Most patients return home however, some patients choose to live with a caregiver or need a rehabilitation facility for a short period before returning home. If insurance allows, a Home Health nurse will be recommended to come to your home and assist you in your care when you return home. The length of time that the Visiting Nurse will come to your home will depend on your overall recovery. It is recommended  after you return home that you enroll in a Cardiac Rehab program to continue to improve your physical health.     Follow up care:   After you are discharged, you will follow up with your Surgeon, your Heart Failure Cardiologist and your VAD coordinator. They will collaboratively care for you and make decisions about your treatment. Typically, your first visit will be 1 week after discharge. We will see you every week for 3-4 weeks, followed by every 2 weeks for 1 month, then monthly thereafter while you have the VAD. Once you are considered a stable established patient, your Physicians may decide that you can follow-up every 2-3 months. Along with seeing your Cardiologist and Surgeon, you will have laboratory testing, and other physiological testing done on a regular basis in order to monitor and maintain your progress and health. The types of testing that you may need and the frequency will be decided by your Physicians but can include blood tests, EKG, Echocardiogram, Right Heart Catheterization, V02 Treadmill Stress Test, and Implantable Cardioverter Defibrillator (ICD) device check. If you have received an investigational VAD, you will  have other testing that will be required for the research study. You will be required to take anticoagulation medications, also known as blood thinning medications (coumadin, warfarin). You will   be required to have frequent blood draws, up to 2-3 times a week, in order to monitor your blood count and blood thinning agents. You will also be in frequent contact with a VAD Coordinator who will make phone calls to assess how you are doing at home and assist you with any problems that may arise. A VAD Coordinator and a Heart Failure Cardiologist are also available 24 hours a day in the event that you have an emergency. On average, you can expect that within 12 weeks after surgery, you will be able to return to most activities, with the permission of your VAD Team.     Lifestyle Changes and Prohibited Activities:   You will have limitations and can resume most usual activities. Certain activities are hazardous or fatal after implant. Persons with implantable VADs must not allow their controller/computer and electrical equipment to submerge in water. Showering is possible with proper protective equipment. You may only resume showering once your driveline has healed and your VAD coordinator gives their permission. Swimming and baths are prohibited. You cannot sleep on your stomach or the side the driveline is exiting your abdomen. Contact sports, repetitive jumping, or impact with an airbag are examples of activities that may cause trauma to the pump attachments and must be avoided.   You may be sexually active but must care for your driveline. Female patients cannot get pregnant. Medical care after implant includes lifetime follow up to monitor device function and health status. You may not have a magnetic resonance imaging (MRI) test because of the magnetic fields. You may not vacuum, touch television or computer screens, or take hot clothes out of the dryer due to the static electricity. VAD therapy requires  significant self-care responsibility and a willingness to participate with you VAD team. Driveline exit site dressings must be performed daily using sterile technique outlined in your care of driveline documentation or as directed by your VAD team. Care of your driveline must be done daily by yourself or caregiver. Maintenance care of the device components, batteries, and driveline is necessary to prevent pump failure, infections, or other serious complications. You will continue to take medications for your heart failure although the dose and or medication name may change. You may continue to take your diuretic such as lasix or demedex. You may also continue to be on a fluid restriction. You may drive once approved by your VAD team. You may be able to travel with your VAD, depending on the situation.     VAD Equipment:   Along with the device that is implanted inside your body, you will have a number of other external pieces of equipment that will require care and maintenance. You will have a driveline that exits your body through your abdomen that will power a controller, which is the computer component that tells the heart pump how to perform. The controller will also tell you about alarms, sounds, and words, on how your pump is operating and if there are any problems. In order to power the device and the controller, you will have batteries and a battery charger and/or power device. The batteries allow you to be mobile and move freely without being attached to outlet power. The  or power device allows   you to be connected to power for long periods of time such as when you are sleeping. Different VADs have similar pieces of equipment, but will vary depending on the device you receive. You will receive education and teaching before you leave the hospital to make sure you understand clearly how to operate the equipment and troubleshoot problems that may occur.     Confidentiality:   Hospital personnel who  are involved in the course of your care may review your medical record. Communication between you and [facility] remains confidential. If you do become a candidate for transplant, data about your case, which will include your identity, will be sent to [employer] ([employer]) and may be sent to other places involved in the transplant process as permitted by law. Data about your VAD, which will include your identity, will be shared with the  of the device. Device  may be involved in your care alongside your VAD Team in the hospital and/or clinic setting. Your information may also be entered into a registry for pump implants, known as INTERMACS. Your participation in this is voluntary, and will be discussed at greater length prior to implant.     End of Life:   If you are approaching the end of life, the VAD can be turned off. You may be in a hospital, home, or hospice setting. If you become very sick and do not have a chance to survive, we may talk about stopping the pump. The doctor would talk to you or your family about what is right for you. It is helpful to talk to your family about your goals before surgery so they know what your wishes are. A member of our Goals of Care team will visit you before surgery to help you learn your goals. You have the right to have the device turned off or to decline pump exchange if your pump fails or malfunctions.     Device Return:   At the time of either transplant or death, the surgeon may need to remove the device to return to the . You or a family member may need to sign a separate consent for removal of the device.   Questions   We encourage you to learn everything you can about the potential benefits and risks of having a VAD. If you or your family has any questions, you should feel free to contact your Transplant Coordinator or VAD Coordinator.   By signing this form, you understand and have reviewed the implant  procedure as well as the potential benefits and risks involved with the getting a VAD. You also acknowledge and understand the care that will be required to maintain this device and yourself, including changes in your lifestyle, and impact on your independence.

## 2020-11-17 NOTE — TELEPHONE ENCOUNTER
Reviewed ct scan with Dr Chauhan recommending renal us to follow up on possible complex renal cysts.  OK to order and schedule.     appt added to this afternoon.  Pt aware and is agreeable.

## 2020-11-17 NOTE — PROGRESS NOTES
Subjective:      Patient ID: Rocco France is a 65 y.o. male.    Chief Complaint: No chief complaint on file.      HPI:  Rocco France is a 65 y.o. male who presents for surgical evaluation for advanced heart failure options. Medical conditions include stage C HFrEF (EF=25%), NICMP, s/p ICD, HTN, HLD, T2DM, 2 PCI, pulmonary HTN. Followed by [facility]. Has had 4 HF admissions this year. Reports received a stent in October 2019 and May 2020 in [place]. Reports heart failure symptoms increased this past year. Swelling became a real issue leading to SOB and decreased mobility. Today however reports that since his fluid status has become controlled, he is not having any further SOB. Able to walk and complete physical activity without difficulty. Energy level is good. Denies any dizziness, chest pain, palpations. Has slept in a recliner for years, prior to any heart failure symptoms. Denies any previous strokes, seizures, blood clots, sternotomies. Occasionally will have 1 cigarette, denies every being a heavy smoker. Reports drinking 1 beer and a daiquiri or yenny per week. Blood fairly well controlled, last A1C 7.4. Reports has lost some weight, used to be 215 pounds and today is 183.       Family and social history reviewed    Review of patient's allergies indicates:   Allergen Reactions    Pcn [penicillins] Hives     Past Medical History:   Diagnosis Date    CHF (congestive heart failure)     Coronary artery disease     Diabetes mellitus     Hypertension     Kidney stones      Past Surgical History:   Procedure Laterality Date    CARDIAC CATHETERIZATION  2010    no stents    CARDIAC DEFIBRILLATOR PLACEMENT  2010    CARDIAC DEFIBRILLATOR PLACEMENT      HERNIA REPAIR      KNEE ARTHROSCOPY Right     RIGHT HEART CATHETERIZATION Right 11/2/2020    Procedure: INSERTION, CATHETER, RIGHT HEART;  Surgeon: Deana Lake MD;  Location: [facility] CATH LAB;  Service: Cardiology;  Laterality: Right;     Family History      Problem Relation (Age of Onset)    Heart attack Mother, Brother    Heart failure Brother    Hypertension Brother        Social History     Socioeconomic History    Marital status:      Spouse name: Not on file    Number of children: Not on file    Years of education: Not on file    Highest education level: Not on file   Occupational History    Not on file   Social Needs    Financial resource strain: Not on file    Food insecurity     Worry: Not on file     Inability: Not on file    Transportation needs     Medical: Not on file     Non-medical: Not on file   Tobacco Use    Smoking status: Current Some Day Smoker     Types: Cigarettes   Substance and Sexual Activity    Alcohol use: No    Drug use: Not on file    Sexual activity: Not on file   Lifestyle    Physical activity     Days per week: Not on file     Minutes per session: Not on file    Stress: Not on file   Relationships    Social connections     Talks on phone: Not on file     Gets together: Not on file     Attends Buddhism service: Not on file     Active member of club or organization: Not on file     Attends meetings of clubs or organizations: Not on file     Relationship status: Not on file   Other Topics Concern    Not on file   Social History Narrative    Not on file       Current medications Reviewed    Review of Systems   Constitutional: Negative for fatigue.   HENT: Negative for nosebleeds.    Eyes: Negative for visual disturbance.   Respiratory: Negative for shortness of breath.    Cardiovascular: Negative for chest pain, palpitations and leg swelling.   Gastrointestinal: Negative for nausea.   Musculoskeletal: Negative for gait problem.   Skin: Negative for color change.   Neurological: Negative for dizziness, seizures, syncope and numbness.   Hematological: Does not bruise/bleed easily.   Psychiatric/Behavioral: Negative for sleep disturbance.     Objective:   Physical Exam  Vitals signs reviewed.   Constitutional:        General: He is not in acute distress.     Appearance: He is well-developed. He is not diaphoretic.   HENT:      Head: Normocephalic and atraumatic.   Eyes:      Pupils: Pupils are equal, round, and reactive to light.   Neck:      Musculoskeletal: Normal range of motion.      Vascular: No JVD.   Cardiovascular:      Rate and Rhythm: Normal rate.   Pulmonary:      Effort: Pulmonary effort is normal. No respiratory distress.   Abdominal:      General: There is no distension.   Musculoskeletal: Normal range of motion.   Skin:     Coloration: Skin is not pale.      Findings: No erythema.   Neurological:      Mental Status: He is alert and oriented to person, place, and time.   Psychiatric:         Speech: Speech normal.         Behavior: Behavior normal.         Thought Content: Thought content normal.         Judgment: Judgment normal.         Diagnostic Results: reviewed   CT CAP 11/16/20  There are few pulmonary micro nodules the largest about 3 mm.  For multiple solid nodules all <6 mm, Fleischner Society 2017 guidelines recommend no routine follow up for a low risk patient, or follow up with non-contrast chest CT at 12 months after discovery in a high risk patient.     There is some patchy irregular opacities in the left lower lobe. This may be small airways disease though developing infiltrate not excluded.     Hypodensity in the left kidney with curvilinear calcification most likely a minimally complex cyst.  Further evaluation with ultrasound suggested.     Cholelithiasis.     Prostatomegaly.    Carotid US 11/16/20  · There is 0-19% right Internal Carotid Stenosis.  · There is 0-19% left Internal Carotid Stenosis.    RHC 11/2/2020  RA: 11/ 9/ 8 RV: 62/ 1/ 9 PA: 61/ 26/ 40 PWP: 32/ 44/ 29 .   Cardiac output was 3.87  by Monse. Cardiac index is 1.85 L/min/m2.   O2 Sat: PA 63%.   Moderately reduced CO/CI off inotrope.  Normal right and significantly elevated left sided filling pressures.  Moderate to severe pulmonary  hypertension in the setting of significantly elevated left sided filling pressure.  TPG  11  PVR  2.84  SVR  1922      CPX 11/2/2020  · The patient exercised for 5 minutes and 27 seconds on a medium ramp protocol, achieving a peak heart rate of 99 bpm, which is 64% of the age predicted maximum heart rate.  · During stress, the following significant arrhythmias were observed: rare PVCs.  · There was no significant ST segment deviation noted during stress.  · The test was stopped because the patient experienced fatigue.  · The peak VO2 was 13.5 ml/kg/min which is 45% of predicted equating to a functional capacity of 3.86 METS indicating moderate to severe functional impairment.  · Moderate to severe functional impairment associated with a normal breathing reserve, normal oxygen stauration, an adequate effort, and a borderline reduced AT. These findings are indicative of functional impairment secondary to deconditioning, circulatory insufficiency.    TTE 11/2/2020  · The left ventricle is moderately enlarged with severely decreased systolic function. The estimated ejection fraction is 20%.  · Moderate right ventricular enlargement.  · Mildly reduced right ventricular systolic function.  · Grade III diastolic dysfunction.  · Severe biatrial enlargement.  · Moderate tricuspid regurgitation.  · Moderate mitral regurgitation.  · The estimated PA systolic pressure is 57 mmHg.  · Intermediate central venous pressure (8 mmHg).  · LV 6.86  · TAPSE 1.65    Assessment:   NICM   S/P PCI   Pulmonary HTN   Plan:     CTS Attending Note:    I have personally taken the history and examined this patient and agree with the GIO's note as stated above.  Very pleasant 65-year-old gentleman with nonischemic cardiomyopathy.  I reviewed his CT scan, his echo, his CPX, and his right heart catheterization.  On CT, he has no contraindications to either mechanical support or heart transplantation.  On echo, his right ventricular function appears  adequate for mechanical support.  His CPX is somewhat worrying, but his functional status is extremely good.  I would recommend only medical management for now, but if and when he becomes more symptomatic then I believe mechanical support will be the best option for him given his pulmonary pressures and his blood group.

## 2020-11-18 ENCOUNTER — TELEPHONE (OUTPATIENT)
Dept: TRANSPLANT | Facility: CLINIC | Age: 65
End: 2020-11-18

## 2020-11-18 DIAGNOSIS — N28.1 CYST OF KIDNEY, ACQUIRED: ICD-10-CM

## 2020-11-18 NOTE — TELEPHONE ENCOUNTER
Reviewed renal US with Dr Chauhan. Order received for CT renal mass protocol as recommended by radiology. Order entered.     Left voicemail for pt requesting call back to see his availability for ct and to bring backup caregiver to meet SW.   Will await return call.           Spoke with pt a few hours later who reports he has not yet spoken to backup caregiver to pick a day to come see SW.  Pt will call once that is decided upon so scheduling can be arranged.

## 2020-11-19 NOTE — TELEPHONE ENCOUNTER
Received call from pt that his sister is available to come for a SW appt any Friday in Dec as she works [employer] every week.   Message sent to  to arrange for CT renal mass protocol and SW visit on same day.

## 2020-11-25 ENCOUNTER — TELEPHONE (OUTPATIENT)
Dept: CARDIOTHORACIC SURGERY | Facility: CLINIC | Age: 65
End: 2020-11-25

## 2020-11-25 NOTE — TELEPHONE ENCOUNTER
Called and spoke with pt regarding clinic appt with Dr. Corona regarding advanced options F/U for possible LVAD. Pt scheduled to see Dr. Corona on 12/14, which pt verbalized understanding to.  Appt slip mailed to pt.

## 2020-11-30 ENCOUNTER — TELEPHONE (OUTPATIENT)
Dept: CARDIOTHORACIC SURGERY | Facility: CLINIC | Age: 65
End: 2020-11-30

## 2020-11-30 NOTE — TELEPHONE ENCOUNTER
Called and rescheduled pt's appt with Dr. Corona on 12/14.  Pt verbalized understanding.  Appt slip mailed to pt.

## 2020-12-04 ENCOUNTER — SOCIAL WORK (OUTPATIENT)
Dept: TRANSPLANT | Facility: CLINIC | Age: 65
End: 2020-12-04
Payer: MEDICARE

## 2020-12-04 ENCOUNTER — HOSPITAL ENCOUNTER (OUTPATIENT)
Dept: RADIOLOGY | Facility: HOSPITAL | Age: 65
Discharge: HOME | End: 2020-12-04
Attending: INTERNAL MEDICINE
Payer: MEDICARE

## 2020-12-04 ENCOUNTER — CLINICAL SUPPORT (OUTPATIENT)
Dept: TRANSPLANT | Facility: CLINIC | Age: 65
End: 2020-12-04
Payer: MEDICARE

## 2020-12-04 DIAGNOSIS — N28.1 CYST OF KIDNEY, ACQUIRED: ICD-10-CM

## 2020-12-04 PROCEDURE — 74160 CT ABDOMEN WITH CONTRAST: ICD-10-PCS | Mod: 26,TXP,, | Performed by: RADIOLOGY

## 2020-12-04 PROCEDURE — 74160 CT ABDOMEN W/CONTRAST: CPT | Mod: 26,TXP,, | Performed by: RADIOLOGY

## 2020-12-04 PROCEDURE — 74160 CT ABDOMEN W/CONTRAST: CPT | Mod: TC,TXP

## 2020-12-04 PROCEDURE — 25500020 PHARM REV CODE 255: Mod: TXP | Performed by: INTERNAL MEDICINE

## 2020-12-04 RX ADMIN — IOHEXOL 15 ML: 350 INJECTION, SOLUTION INTRAVENOUS at 09:12

## 2020-12-04 RX ADMIN — IOHEXOL 75 ML: 350 INJECTION, SOLUTION INTRAVENOUS at 10:12

## 2020-12-04 NOTE — PROGRESS NOTES
Caregiver Update for LVAD/OHT Evaluation    SW met with pt, wife Meme France and sister Desi Blanco in clinic this afternoon in order to confirm backup caregiver plan.     Rocco Román  [address] [address]     About 1.5 Hours away from [facility]     Telephone Information:   Mobile [phone]   Mobile [phone]   Home  [phone] (home)  Work  [phone] (work)  E-mail  No e-mail address on record    Sex: male  YOB: 1955  Age: 65 y.o.    Encounter Date: 12/4/2020    PRIMARY CAREGIVER: Meme France (67), pt's spouse, will be primary caregiver, phone number [phone].     Wife is retired and has flexibility. While she did receive a Heart Transplant in 2015, she reports that she is medically stable. Wife had a three week hospitalization in May 2020 due to pneumonia, but is fully recovered.     Additional Significant Others who will Assist with LVAD/Transplant:  Name: Desi Blanco (#[phone])  Age: 64  City: [place] State: [place]  Relationship: sister  Does person drive? yes     Sister reports that she is a retired  and has her own vehicle to provide transportation.     SW reviewed caregiver role for LVAD/OHT with pt's sister. Sister reports commitment.     SW followed up with pt regarding transition of health insurance from Medicare A,B,D to PHN. Pt reports that [employer] will begin on Jan 1, 2021.     Pt states that he has remained abstinent from cigarettes for the last 1.5 months. Pt aware that he will need to wait 4.5 months before he'd meet criteria for transplant candidacy (6 month abstinence from tobacco is required).       Transplant Social Work - Candidacy  Assessment/Plan:     Psychosocial Suitability: Patient presents as a suitable candidate for LVAD at this time. Based on psychosocial risk factors, patient presents as medium risk, due to limited finances and recent tobacco use (less than 6 months). Protective factors include supportive  caregiver plan, adequate health insurance for LVAD (currently or when he switches to PHN), transportation, motivation, and insight. In addition, pt has no major mental health or substance abuse issues. Pt reports commitment to fully abstaining from smoking cigarettes.     Patient presents as a not suitable candidate for Heart Transplant at this time due to needing to remain abstinent from tobacco for a total of 6 months. Protective factors include concrete caregiver plan, adequate health insurance by Jan 1, 2021, transportation, motivation, and no major mental health or substance abuse issues.     Recommendations/Additional Comments:     Pt aware that he will need to remain tobacco free for at least 6 months in order to be an OHT candidate.    Pt aware that his [facility] health insurance will need to be active before pt can be an OHT candidate. This will occur on Jan 1, 2021.     Pt and family aware of fundraising options for misc costs not covered by health insurance (food, gas, lodging) and that there are currently no overnight visitors allowed at the hospital due to the COVID-19 pandemic.     Discussed local lodging at [facility] should pt be a candidate for Heart Transplant.         Pricilla Ferguson LCSW

## 2020-12-04 NOTE — PROGRESS NOTES
Met with pt, wife and sister. Medical records release and photo waiver signed. Home inspection and emergency contact forms given to take and fill out. All questions answered to pt, wife and sister's satisfaction.

## 2020-12-08 ENCOUNTER — DOCUMENTATION ONLY (OUTPATIENT)
Dept: TRANSFUSION MEDICINE | Facility: HOSPITAL | Age: 65
End: 2020-12-08
Payer: MEDICARE

## 2020-12-08 DIAGNOSIS — Z76.82 HEART TRANSPLANT CANDIDATE: ICD-10-CM

## 2020-12-08 PROCEDURE — 80502 PR  LAB PATHOLOGY CONSULT-COMPLETE: ICD-10-PCS | Mod: ,,, | Performed by: PATHOLOGY

## 2020-12-08 PROCEDURE — 80502 PR  LAB PATHOLOGY CONSULT-COMPLETE: CPT | Mod: ,,, | Performed by: PATHOLOGY

## 2020-12-08 NOTE — PROGRESS NOTES
"[facility]  Section of Transfusion Medicine and Histocompatibility  HLA Note    Case Details   Diagnosis:  No primary diagnosis found.  Blood Type: O POS  HLA Type:   Class I:  Lab Results   Component Value Date    CJEA0CA 33 11/16/2020    WDUP3MX 68 11/16/2020    FJXF3LP 7 11/16/2020    MAMC0VC 45 11/16/2020    IARHI7AM 6 11/16/2020    OBJIK1LW XX 11/16/2020    WGJET8YU 7 11/16/2020    ULZSN4HZ 16 11/16/2020     Class II:  Lab Results   Component Value Date    DQYEDI44UI 1 11/16/2020    ZBVJIZ30UE 15 11/16/2020    BANBKG996AA 51 11/16/2020    OOJTEZ3143 XX 11/16/2020    AXYCX5KE 5 11/16/2020    HNBYB7DY 6 11/16/2020     Recent Antibody Screen/ID Results:   Lab Results   Component Value Date    CIABCLM WEAK A80(7454) 11/16/2020    ABCMT WEAK DQ8(6013) 11/16/2020     Auto T Cell Crossmatch Results:  Lab Results   Component Value Date    XMTCELLRES Negative 11/16/2020     Auto B Cell Crossmatch Results:  Lab Results   Component Value Date    BCELLRES Negative 11/16/2020     Assessment     Interpretation: Weak antibodies to HLA are present. A virtual crossmatch is recommended.    Strongly Recommended Unacceptable Antigens: A80; DQ8 (the MFI is below the usual cut-off, but there is "stacking")    Crossmatch Expectations: (Given strongly recommended unacceptable antigens) A prospective or retrospective flow cytometric crossmatch is expected to be negative.    Please call the HLA Lab [phone] with any concerns or questions.    GENI Martinez MD, SYDNIE  Section of Transfusion Medicine & Histocompatibility  Department of Pathology and Laboratory Medicine  [facility]  12/08/2020              "

## 2020-12-09 ENCOUNTER — COMMITTEE REVIEW (OUTPATIENT)
Dept: TRANSPLANT | Facility: CLINIC | Age: 65
End: 2020-12-09

## 2020-12-09 ENCOUNTER — TELEPHONE (OUTPATIENT)
Dept: TRANSPLANT | Facility: CLINIC | Age: 65
End: 2020-12-09

## 2020-12-09 NOTE — TELEPHONE ENCOUNTER
Received call back from pt. Informed him of committee decision to decline for OHT due to PA pressures and need to be nicotine free for 6mths.     Advised that he is approved for LVAD and that the surgeon will discuss timing of surgery at his appt on Monday 12/14.  Pt voiced understanding.

## 2020-12-09 NOTE — COMMITTEE REVIEW
Native Organ Dx: NICM    Denied     Pt's case presented to Selection Committee 12/9/20. It was the committee decision to decline the pt for OHT listing due to elevated PA pressures and need to be nicotine free for 6mths.     Pt is approved for LVAD as DT    Note was written by Darlyn Martines.    ==========================================================

## 2020-12-11 ENCOUNTER — TELEPHONE (OUTPATIENT)
Dept: CARDIOTHORACIC SURGERY | Facility: CLINIC | Age: 65
End: 2020-12-11

## 2020-12-11 NOTE — TELEPHONE ENCOUNTER
Called and confirmed pt's appt with Dr. Corona on 12/14.  Pt verbalized understanding of appt time and location.

## 2020-12-14 ENCOUNTER — DOCUMENTATION ONLY (OUTPATIENT)
Dept: CARDIOTHORACIC SURGERY | Facility: CLINIC | Age: 65
End: 2020-12-14

## 2020-12-14 ENCOUNTER — OFFICE VISIT (OUTPATIENT)
Dept: CARDIOTHORACIC SURGERY | Facility: CLINIC | Age: 65
End: 2020-12-14
Payer: MEDICARE

## 2020-12-14 VITALS
SYSTOLIC BLOOD PRESSURE: 110 MMHG | HEIGHT: 74 IN | HEART RATE: 83 BPM | TEMPERATURE: 98 F | OXYGEN SATURATION: 100 % | BODY MASS INDEX: 23.8 KG/M2 | WEIGHT: 185.44 LBS | DIASTOLIC BLOOD PRESSURE: 66 MMHG

## 2020-12-14 DIAGNOSIS — I42.8 NICM (NONISCHEMIC CARDIOMYOPATHY): Primary | ICD-10-CM

## 2020-12-14 DIAGNOSIS — I50.9 HEART FAILURE, UNSPECIFIED HF CHRONICITY, UNSPECIFIED HEART FAILURE TYPE: ICD-10-CM

## 2020-12-14 PROCEDURE — 99213 OFFICE O/P EST LOW 20 MIN: CPT | Mod: PBBFAC | Performed by: THORACIC SURGERY (CARDIOTHORACIC VASCULAR SURGERY)

## 2020-12-14 PROCEDURE — 99999 PR PBB SHADOW E&M-EST. PATIENT-LVL III: CPT | Mod: PBBFAC,,, | Performed by: THORACIC SURGERY (CARDIOTHORACIC VASCULAR SURGERY)

## 2020-12-14 PROCEDURE — 99215 PR OFFICE/OUTPT VISIT, EST, LEVL V, 40-54 MIN: ICD-10-PCS | Mod: S$PBB,,, | Performed by: THORACIC SURGERY (CARDIOTHORACIC VASCULAR SURGERY)

## 2020-12-14 PROCEDURE — 99215 OFFICE O/P EST HI 40 MIN: CPT | Mod: S$PBB,,, | Performed by: THORACIC SURGERY (CARDIOTHORACIC VASCULAR SURGERY)

## 2020-12-14 PROCEDURE — 99999 PR PBB SHADOW E&M-EST. PATIENT-LVL III: ICD-10-PCS | Mod: PBBFAC,,, | Performed by: THORACIC SURGERY (CARDIOTHORACIC VASCULAR SURGERY)

## 2020-12-14 NOTE — PROGRESS NOTES
Subjective:      Patient ID: Rocco France is a 65 y.o. male.    Chief Complaint: No chief complaint on file.    HPI:  Rocco France is a 65 y.o. male who presents for surgical evaluation for advanced heart failure options. Medical conditions include stage C HFrEF (EF=25%), NICMP, s/p ICD, HTN, HLD, T2DM, 2 PCI, pulmonary HTN. Followed by [facility]. Has had 4 HF admissions this year. Reports received a stent in October 2019 and May 2020 in [place]. Reports heart failure symptoms increased this past year. Swelling became a real issue leading to SOB and decreased mobility. Today however reports that since his fluid status has become controlled, he is not having any further SOB. Able to walk and complete physical activity without difficulty. Energy level is good. Denies any dizziness, chest pain, palpations. Has slept in a recliner for years, prior to any heart failure symptoms. Denies any previous strokes, seizures, blood clots, sternotomies. Occasionally will have 1 cigarette, denies every being a heavy smoker. Reports drinking 1 beer and a daiquiri or yenny per week. Blood fairly well controlled, last A1C 7.4. Patient was seen by Dr. Akbar in November who states on CT, he has no contraindications to either mechanical support or heart transplantation.  On echo, his right ventricular function appears adequate for mechanical support.  His CPX is somewhat worrying, but his functional status is extremely good.  He presents today to discuss advanced options for heart failure.  Since we last saw him in clinic, he reports one episode of near syncope however he denies known syncope but does not remember the episode.   Committee notes reviewed as follows: Denied, Pt's case presented to Selection Committee 12/9/20. It was the committee decision to decline the pt for OHT listing due to elevated PA pressures and need to be nicotine free for 6mths. Pt is approved for LVAD as DT    Current medications Reviewed    Review of  Systems   Constitutional: Negative for activity change, appetite change, fatigue and fever.   HENT: Negative for nosebleeds.    Respiratory: Negative for cough and shortness of breath.    Cardiovascular: Negative for chest pain, palpitations and leg swelling.   Gastrointestinal: Negative for abdominal distention, abdominal pain and nausea.   Genitourinary: Negative for frequency.   Musculoskeletal: Negative for arthralgias and myalgias.   Skin: Negative for rash.   Neurological: Negative for dizziness and numbness.   Hematological: Does not bruise/bleed easily.     Objective:   Physical Exam  Constitutional:       Appearance: He is well-developed.   HENT:      Head: Normocephalic and atraumatic.   Eyes:      Extraocular Movements: Extraocular movements intact.   Neck:      Musculoskeletal: Normal range of motion.   Cardiovascular:      Rate and Rhythm: Normal rate and regular rhythm.      Heart sounds: Normal heart sounds.   Pulmonary:      Effort: Pulmonary effort is normal.      Breath sounds: Normal breath sounds.   Abdominal:      Palpations: Abdomen is soft.   Musculoskeletal: Normal range of motion.   Skin:     General: Skin is warm and dry.      Capillary Refill: Capillary refill takes less than 2 seconds.   Neurological:      Mental Status: He is alert and oriented to person, place, and time.   Psychiatric:         Mood and Affect: Mood normal.         Behavior: Behavior normal.       Diagnotic Results: Reviewed   CT CAP 11/16/20  There are few pulmonary micro nodules the largest about 3 mm.  For multiple solid nodules all <6 mm, Fleischner Society 2017 guidelines recommend no routine follow up for a low risk patient, or follow up with non-contrast chest CT at 12 months after discovery in a high risk patient.  There is some patchy irregular opacities in the left lower lobe. This may be small airways disease though developing infiltrate not excluded.  Hypodensity in the left kidney with curvilinear  calcification most likely a minimally complex cyst.  Further evaluation with ultrasound suggested.  Cholelithiasis.  Prostatomegaly.     Carotid US 11/16/20  · There is 0-19% right Internal Carotid Stenosis.  · There is 0-19% left Internal Carotid Stenosis.     RHC 11/2/2020  RA: 11/ 9/ 8 RV: 62/ 1/ 9 PA: 61/ 26/ 40 PWP: 32/ 44/ 29 .   Cardiac output was 3.87  by Monse. Cardiac index is 1.85 L/min/m2.   O2 Sat: PA 63%.   Moderately reduced CO/CI off inotrope.  Normal right and significantly elevated left sided filling pressures.  Moderate to severe pulmonary hypertension in the setting of significantly elevated left sided filling pressure.  TPG  11  PVR  2.84  SVR  1922     CPX 11/2/2020  · The patient exercised for 5 minutes and 27 seconds on a medium ramp protocol, achieving a peak heart rate of 99 bpm, which is 64% of the age predicted maximum heart rate.  · During stress, the following significant arrhythmias were observed: rare PVCs.  · There was no significant ST segment deviation noted during stress.  · The test was stopped because the patient experienced fatigue.  · The peak VO2 was 13.5 ml/kg/min which is 45% of predicted equating to a functional capacity of 3.86 METS indicating moderate to severe functional impairment.  · Moderate to severe functional impairment associated with a normal breathing reserve, normal oxygen stauration, an adequate effort, and a borderline reduced AT. These findings are indicative of functional impairment secondary to deconditioning, circulatory insufficiency.     TTE 11/2/2020  · The left ventricle is moderately enlarged with severely decreased systolic function. The estimated ejection fraction is 20%.  · Moderate right ventricular enlargement.  · Mildly reduced right ventricular systolic function.  · Grade III diastolic dysfunction.  · Severe biatrial enlargement.  · Moderate tricuspid regurgitation.  · Moderate mitral regurgitation.  · The estimated PA systolic pressure is 57  mmHg.  · Intermediate central venous pressure (8 mmHg).  · LV 6.86  · TAPSE 1.65  Assessment:   1. Heart Failure  2. Heart Transplant Candidate  Plan:   Patient will be tentatively scheduled for LVAD on January 13th, 2021.

## 2020-12-14 NOTE — PROGRESS NOTES
Pt was seen in clinic today by Dr. Corona and scheduled for LVAD placement on January 13.  Per Dr. Corona, I instructed pt to hold his Plavix 7 for 7 days, with last dose on January 5, and Entresto for 3 days, with the last dose on January 9, in preparation for surgery.  Pt was informed that the LVAD team will contact him with information related to his hospital admission.  Pt verbalized understanding to all instructions.

## 2020-12-17 ENCOUNTER — CONFERENCE (OUTPATIENT)
Dept: TRANSPLANT | Facility: CLINIC | Age: 65
End: 2020-12-17

## 2020-12-17 NOTE — TELEPHONE ENCOUNTER
Rocco France was discussed at selection committee on 12/09/20. Patient presented for OHT and VAD. Declined for OHT due to:  Recent smoking within 6 months and pulmonary hypertension     Patient has an anticipated survival benefit. Patient has NYHA IV symptoms which have failed to respond to optimal medical management.     Decision made to proceed with DT VAD implantation. This has been discussed with the patient and family, including the reasons for why he is not a transplant candidate at this time.     Patient has demonstrated functional limitation with a peak oxygen consumption of less than or equal to 14 ml/kg/min.    Patient is INTERMACS class 4, ACC stage D.     Discussed with the patient and family [facility] program outcomes as reported in INTERMACS (Interagency Registry for Mechanically Assisted Circulatory Support):    1 year survival = 92.7%  2 year survival = 86.7%  3 year survival = 86.7%    Patient and family acknowledged receipt of this information and all questions were answered.

## 2020-12-23 ENCOUNTER — TELEPHONE (OUTPATIENT)
Dept: TRANSPLANT | Facility: CLINIC | Age: 65
End: 2020-12-23

## 2020-12-23 NOTE — TELEPHONE ENCOUNTER
Called pt to inform him when to stop plavix, entresto and his admit date for VAD implant.  Spoke with pt. Instructions given to him to stop entresto on 1/8/2020, plavix 1/6/2020 and his admit date is 1/9/2021.  Pt wrote down instructions and verbalized all back to me with understanding and agreement.

## 2021-01-05 ENCOUNTER — TELEPHONE (OUTPATIENT)
Dept: CARDIOTHORACIC SURGERY | Facility: CLINIC | Age: 66
End: 2021-01-05

## 2021-01-06 ENCOUNTER — DOCUMENTATION ONLY (OUTPATIENT)
Dept: TRANSPLANT | Facility: CLINIC | Age: 66
End: 2021-01-06

## 2021-01-06 ENCOUNTER — HOSPITAL ENCOUNTER (OUTPATIENT)
Dept: PREADMISSION TESTING | Facility: HOSPITAL | Age: 66
Discharge: HOME | End: 2021-01-06
Attending: INTERNAL MEDICINE
Payer: MEDICARE

## 2021-01-06 ENCOUNTER — TELEPHONE (OUTPATIENT)
Dept: ADMINISTRATIVE | Facility: HOSPITAL | Age: 66
End: 2021-01-06

## 2021-01-06 DIAGNOSIS — Z13.9 SCREENING FOR UNSPECIFIED CONDITION: ICD-10-CM

## 2021-01-06 DIAGNOSIS — Z13.9 SCREENING FOR UNSPECIFIED CONDITION: Primary | ICD-10-CM

## 2021-01-06 LAB — SARS-COV-2 RNA RESP QL NAA+PROBE: NOT DETECTED

## 2021-01-06 PROCEDURE — U0002 COVID-19 LAB TEST NON-CDC: HCPCS

## 2021-01-09 ENCOUNTER — HOSPITAL ENCOUNTER (INPATIENT)
Facility: HOSPITAL | Age: 66
LOS: 26 days | Discharge: HOME HEALTH | DRG: 001 | End: 2021-02-04
Attending: EMERGENCY MEDICINE | Admitting: INTERNAL MEDICINE
Payer: MEDICARE

## 2021-01-09 DIAGNOSIS — Z95.811 LVAD (LEFT VENTRICULAR ASSIST DEVICE) PRESENT: Primary | ICD-10-CM

## 2021-01-09 DIAGNOSIS — Z99.11 ENCOUNTER FOR WEANING FROM VENTILATOR: ICD-10-CM

## 2021-01-09 DIAGNOSIS — I50.9 CHRONIC HEART FAILURE, UNSPECIFIED HEART FAILURE TYPE: ICD-10-CM

## 2021-01-09 DIAGNOSIS — I50.43 ACUTE ON CHRONIC COMBINED SYSTOLIC AND DIASTOLIC HEART FAILURE: ICD-10-CM

## 2021-01-09 DIAGNOSIS — Z01.818 PRE-OP EXAM: ICD-10-CM

## 2021-01-09 DIAGNOSIS — I25.10 CORONARY ARTERY DISEASE INVOLVING NATIVE CORONARY ARTERY OF NATIVE HEART WITHOUT ANGINA PECTORIS: ICD-10-CM

## 2021-01-09 DIAGNOSIS — N17.9 AKI (ACUTE KIDNEY INJURY): ICD-10-CM

## 2021-01-09 DIAGNOSIS — I25.10 CORONARY ARTERY DISEASE, ANGINA PRESENCE UNSPECIFIED, UNSPECIFIED VESSEL OR LESION TYPE, UNSPECIFIED WHETHER NATIVE OR TRANSPLANTED HEART: ICD-10-CM

## 2021-01-09 DIAGNOSIS — D62 ACUTE BLOOD LOSS ANEMIA: ICD-10-CM

## 2021-01-09 DIAGNOSIS — I50.20 HFREF (HEART FAILURE WITH REDUCED EJECTION FRACTION): ICD-10-CM

## 2021-01-09 DIAGNOSIS — I50.43 ACUTE ON CHRONIC COMBINED SYSTOLIC AND DIASTOLIC CONGESTIVE HEART FAILURE: ICD-10-CM

## 2021-01-09 DIAGNOSIS — Z20.822 LAB TEST NEGATIVE FOR COVID-19 VIRUS: ICD-10-CM

## 2021-01-09 DIAGNOSIS — I50.9 CONGESTIVE HEART FAILURE, UNSPECIFIED HF CHRONICITY, UNSPECIFIED HEART FAILURE TYPE: ICD-10-CM

## 2021-01-09 DIAGNOSIS — I50.9 CHF (CONGESTIVE HEART FAILURE): ICD-10-CM

## 2021-01-09 DIAGNOSIS — E11.9 TYPE 2 DIABETES MELLITUS WITHOUT COMPLICATION, WITHOUT LONG-TERM CURRENT USE OF INSULIN: ICD-10-CM

## 2021-01-09 DIAGNOSIS — I49.9 IRREGULAR CARDIAC RHYTHM: ICD-10-CM

## 2021-01-09 DIAGNOSIS — R00.0 TACHYCARDIA: ICD-10-CM

## 2021-01-09 LAB
ALBUMIN SERPL BCP-MCNC: 3.7 G/DL (ref 3.5–5.2)
ALP SERPL-CCNC: 78 U/L (ref 55–135)
ALT SERPL W/O P-5'-P-CCNC: 9 U/L (ref 10–44)
ANION GAP SERPL CALC-SCNC: 11 MMOL/L (ref 8–16)
AST SERPL-CCNC: 19 U/L (ref 10–40)
BACTERIA #/AREA URNS AUTO: NORMAL /HPF
BASOPHILS # BLD AUTO: 0.04 K/UL (ref 0–0.2)
BASOPHILS NFR BLD: 0.5 % (ref 0–1.9)
BILIRUB SERPL-MCNC: 0.9 MG/DL (ref 0.1–1)
BILIRUB UR QL STRIP: NEGATIVE
BUN SERPL-MCNC: 18 MG/DL (ref 8–23)
CALCIUM SERPL-MCNC: 9.3 MG/DL (ref 8.7–10.5)
CHLORIDE SERPL-SCNC: 105 MMOL/L (ref 95–110)
CLARITY UR REFRACT.AUTO: CLEAR
CO2 SERPL-SCNC: 22 MMOL/L (ref 23–29)
COLOR UR AUTO: YELLOW
CREAT SERPL-MCNC: 1.1 MG/DL (ref 0.5–1.4)
CTP QC/QA: YES
DIFFERENTIAL METHOD: ABNORMAL
EOSINOPHIL # BLD AUTO: 0.3 K/UL (ref 0–0.5)
EOSINOPHIL NFR BLD: 2.9 % (ref 0–8)
ERYTHROCYTE [DISTWIDTH] IN BLOOD BY AUTOMATED COUNT: 13.4 % (ref 11.5–14.5)
EST. GFR  (AFRICAN AMERICAN): >60 ML/MIN/1.73 M^2
EST. GFR  (NON AFRICAN AMERICAN): >60 ML/MIN/1.73 M^2
GLUCOSE SERPL-MCNC: 127 MG/DL (ref 70–110)
GLUCOSE UR QL STRIP: ABNORMAL
HCT VFR BLD AUTO: 37.4 % (ref 40–54)
HGB BLD-MCNC: 11.6 G/DL (ref 14–18)
HGB UR QL STRIP: NEGATIVE
HYALINE CASTS UR QL AUTO: 0 /LPF
IMM GRANULOCYTES # BLD AUTO: 0.03 K/UL (ref 0–0.04)
IMM GRANULOCYTES NFR BLD AUTO: 0.3 % (ref 0–0.5)
KETONES UR QL STRIP: NEGATIVE
LEUKOCYTE ESTERASE UR QL STRIP: NEGATIVE
LYMPHOCYTES # BLD AUTO: 2.5 K/UL (ref 1–4.8)
LYMPHOCYTES NFR BLD: 28.5 % (ref 18–48)
MAGNESIUM SERPL-MCNC: 1.8 MG/DL (ref 1.6–2.6)
MCH RBC QN AUTO: 29.5 PG (ref 27–31)
MCHC RBC AUTO-ENTMCNC: 31 G/DL (ref 32–36)
MCV RBC AUTO: 95 FL (ref 82–98)
MICROSCOPIC COMMENT: NORMAL
MONOCYTES # BLD AUTO: 0.9 K/UL (ref 0.3–1)
MONOCYTES NFR BLD: 10.1 % (ref 4–15)
NEUTROPHILS # BLD AUTO: 5 K/UL (ref 1.8–7.7)
NEUTROPHILS NFR BLD: 57.7 % (ref 38–73)
NITRITE UR QL STRIP: NEGATIVE
NRBC BLD-RTO: 0 /100 WBC
PH UR STRIP: 5 [PH] (ref 5–8)
PHOSPHATE SERPL-MCNC: 2.9 MG/DL (ref 2.7–4.5)
PLATELET # BLD AUTO: 175 K/UL (ref 150–350)
PMV BLD AUTO: 10.7 FL (ref 9.2–12.9)
POCT GLUCOSE: 132 MG/DL (ref 70–110)
POCT GLUCOSE: 171 MG/DL (ref 70–110)
POTASSIUM SERPL-SCNC: 4.1 MMOL/L (ref 3.5–5.1)
PROT SERPL-MCNC: 8.6 G/DL (ref 6–8.4)
PROT UR QL STRIP: ABNORMAL
RBC # BLD AUTO: 3.93 M/UL (ref 4.6–6.2)
RBC #/AREA URNS AUTO: 0 /HPF (ref 0–4)
SARS-COV-2 RDRP RESP QL NAA+PROBE: NEGATIVE
SODIUM SERPL-SCNC: 138 MMOL/L (ref 136–145)
SP GR UR STRIP: 1.02 (ref 1–1.03)
SQUAMOUS #/AREA URNS AUTO: 0 /HPF
URN SPEC COLLECT METH UR: ABNORMAL
WBC # BLD AUTO: 8.64 K/UL (ref 3.9–12.7)
WBC #/AREA URNS AUTO: 1 /HPF (ref 0–5)

## 2021-01-09 PROCEDURE — 25000003 PHARM REV CODE 250: Performed by: INTERNAL MEDICINE

## 2021-01-09 PROCEDURE — 80053 COMPREHEN METABOLIC PANEL: CPT

## 2021-01-09 PROCEDURE — 87040 BLOOD CULTURE FOR BACTERIA: CPT

## 2021-01-09 PROCEDURE — 84100 ASSAY OF PHOSPHORUS: CPT

## 2021-01-09 PROCEDURE — 93005 ELECTROCARDIOGRAM TRACING: CPT

## 2021-01-09 PROCEDURE — 25000242 PHARM REV CODE 250 ALT 637 W/ HCPCS: Performed by: HOSPITALIST

## 2021-01-09 PROCEDURE — 20600001 HC STEP DOWN PRIVATE ROOM

## 2021-01-09 PROCEDURE — 99223 PR INITIAL HOSPITAL CARE,LEVL III: ICD-10-PCS | Mod: AI,,, | Performed by: INTERNAL MEDICINE

## 2021-01-09 PROCEDURE — 93010 ELECTROCARDIOGRAM REPORT: CPT | Mod: ,,, | Performed by: INTERNAL MEDICINE

## 2021-01-09 PROCEDURE — 63600175 PHARM REV CODE 636 W HCPCS: Performed by: HOSPITALIST

## 2021-01-09 PROCEDURE — 99285 EMERGENCY DEPT VISIT HI MDM: CPT | Mod: 25

## 2021-01-09 PROCEDURE — 99285 EMERGENCY DEPT VISIT HI MDM: CPT | Mod: ,,, | Performed by: EMERGENCY MEDICINE

## 2021-01-09 PROCEDURE — 83735 ASSAY OF MAGNESIUM: CPT

## 2021-01-09 PROCEDURE — 93010 EKG 12-LEAD: ICD-10-PCS | Mod: ,,, | Performed by: INTERNAL MEDICINE

## 2021-01-09 PROCEDURE — 36415 COLL VENOUS BLD VENIPUNCTURE: CPT

## 2021-01-09 PROCEDURE — 25000003 PHARM REV CODE 250: Performed by: HOSPITALIST

## 2021-01-09 PROCEDURE — U0002 COVID-19 LAB TEST NON-CDC: HCPCS | Performed by: EMERGENCY MEDICINE

## 2021-01-09 PROCEDURE — 99223 1ST HOSP IP/OBS HIGH 75: CPT | Mod: AI,,, | Performed by: INTERNAL MEDICINE

## 2021-01-09 PROCEDURE — 85025 COMPLETE CBC W/AUTO DIFF WBC: CPT

## 2021-01-09 PROCEDURE — 81001 URINALYSIS AUTO W/SCOPE: CPT

## 2021-01-09 PROCEDURE — 99285 PR EMERGENCY DEPT VISIT,LEVEL V: ICD-10-PCS | Mod: ,,, | Performed by: EMERGENCY MEDICINE

## 2021-01-09 PROCEDURE — 63600175 PHARM REV CODE 636 W HCPCS: Performed by: INTERNAL MEDICINE

## 2021-01-09 RX ORDER — FUROSEMIDE 10 MG/ML
80 INJECTION INTRAMUSCULAR; INTRAVENOUS DAILY
Status: DISCONTINUED | OUTPATIENT
Start: 2021-01-09 | End: 2021-01-09

## 2021-01-09 RX ORDER — POTASSIUM CHLORIDE 20 MEQ/1
40 TABLET, EXTENDED RELEASE ORAL DAILY
Status: DISCONTINUED | OUTPATIENT
Start: 2021-01-09 | End: 2021-01-10

## 2021-01-09 RX ORDER — IBUPROFEN 200 MG
16 TABLET ORAL
Status: DISCONTINUED | OUTPATIENT
Start: 2021-01-09 | End: 2021-01-21

## 2021-01-09 RX ORDER — FENOFIBRATE 160 MG/1
160 TABLET ORAL DAILY
Status: DISCONTINUED | OUTPATIENT
Start: 2021-01-09 | End: 2021-01-13

## 2021-01-09 RX ORDER — CARVEDILOL 3.12 MG/1
6.25 TABLET ORAL 2 TIMES DAILY
Status: DISCONTINUED | OUTPATIENT
Start: 2021-01-09 | End: 2021-01-09

## 2021-01-09 RX ORDER — FUROSEMIDE 40 MG/1
40 TABLET ORAL DAILY
Status: DISCONTINUED | OUTPATIENT
Start: 2021-01-09 | End: 2021-01-09

## 2021-01-09 RX ORDER — IBUPROFEN 200 MG
24 TABLET ORAL
Status: DISCONTINUED | OUTPATIENT
Start: 2021-01-09 | End: 2021-01-21

## 2021-01-09 RX ORDER — GLUCAGON 1 MG
1 KIT INJECTION
Status: DISCONTINUED | OUTPATIENT
Start: 2021-01-09 | End: 2021-01-21

## 2021-01-09 RX ORDER — SODIUM CHLORIDE 0.9 % (FLUSH) 0.9 %
10 SYRINGE (ML) INJECTION
Status: DISCONTINUED | OUTPATIENT
Start: 2021-01-09 | End: 2021-02-04 | Stop reason: HOSPADM

## 2021-01-09 RX ORDER — INSULIN ASPART 100 [IU]/ML
1-10 INJECTION, SOLUTION INTRAVENOUS; SUBCUTANEOUS
Status: DISCONTINUED | OUTPATIENT
Start: 2021-01-09 | End: 2021-01-21

## 2021-01-09 RX ORDER — FUROSEMIDE 10 MG/ML
80 INJECTION INTRAMUSCULAR; INTRAVENOUS ONCE
Status: COMPLETED | OUTPATIENT
Start: 2021-01-09 | End: 2021-01-09

## 2021-01-09 RX ORDER — ASPIRIN 81 MG/1
81 TABLET ORAL DAILY
Status: DISCONTINUED | OUTPATIENT
Start: 2021-01-09 | End: 2021-01-13

## 2021-01-09 RX ORDER — PANTOPRAZOLE SODIUM 40 MG/1
40 TABLET, DELAYED RELEASE ORAL DAILY
Status: DISCONTINUED | OUTPATIENT
Start: 2021-01-09 | End: 2021-01-13

## 2021-01-09 RX ORDER — ATORVASTATIN CALCIUM 20 MG/1
80 TABLET, FILM COATED ORAL DAILY
Status: DISCONTINUED | OUTPATIENT
Start: 2021-01-09 | End: 2021-02-04 | Stop reason: HOSPADM

## 2021-01-09 RX ADMIN — ATORVASTATIN CALCIUM 80 MG: 20 TABLET, FILM COATED ORAL at 04:01

## 2021-01-09 RX ADMIN — FUROSEMIDE 10 MG/HR: 10 INJECTION, SOLUTION INTRAMUSCULAR; INTRAVENOUS at 04:01

## 2021-01-09 RX ADMIN — POTASSIUM CHLORIDE 40 MEQ: 1500 TABLET, EXTENDED RELEASE ORAL at 03:01

## 2021-01-09 RX ADMIN — ASPIRIN 81 MG: 81 TABLET, COATED ORAL at 04:01

## 2021-01-09 RX ADMIN — FENOFIBRATE 160 MG: 160 TABLET ORAL at 03:01

## 2021-01-09 RX ADMIN — PANTOPRAZOLE SODIUM 40 MG: 40 TABLET, DELAYED RELEASE ORAL at 04:01

## 2021-01-09 RX ADMIN — FUROSEMIDE 80 MG: 10 INJECTION, SOLUTION INTRAMUSCULAR; INTRAVENOUS at 04:01

## 2021-01-10 LAB
ANION GAP SERPL CALC-SCNC: 12 MMOL/L (ref 8–16)
ASCENDING AORTA: 3.4 CM
AV INDEX (PROSTH): 0.62
AV MEAN GRADIENT: 2 MMHG
AV PEAK GRADIENT: 3 MMHG
AV VALVE AREA: 2.33 CM2
AV VELOCITY RATIO: 0.68
BASOPHILS # BLD AUTO: 0.03 K/UL (ref 0–0.2)
BASOPHILS NFR BLD: 0.4 % (ref 0–1.9)
BSA FOR ECHO PROCEDURE: 2.04 M2
BUN SERPL-MCNC: 23 MG/DL (ref 8–23)
CALCIUM SERPL-MCNC: 9.7 MG/DL (ref 8.7–10.5)
CHLORIDE SERPL-SCNC: 99 MMOL/L (ref 95–110)
CO2 SERPL-SCNC: 26 MMOL/L (ref 23–29)
CREAT SERPL-MCNC: 1.4 MG/DL (ref 0.5–1.4)
CV ECHO LV RWT: 0.25 CM
DIFFERENTIAL METHOD: ABNORMAL
DOP CALC AO PEAK VEL: 0.8 M/S
DOP CALC AO VTI: 13.63 CM
DOP CALC LVOT AREA: 3.8 CM2
DOP CALC LVOT DIAMETER: 2.19 CM
DOP CALC LVOT PEAK VEL: 0.54 M/S
DOP CALC LVOT STROKE VOLUME: 31.74 CM3
DOP CALCLVOT PEAK VEL VTI: 8.43 CM
E WAVE DECELERATION TIME: 105.92 MSEC
E/A RATIO: 0.77
E/E' RATIO: 11.5 M/S
ECHO LV POSTERIOR WALL: 0.78 CM (ref 0.6–1.1)
EOSINOPHIL # BLD AUTO: 0.3 K/UL (ref 0–0.5)
EOSINOPHIL NFR BLD: 3.3 % (ref 0–8)
ERYTHROCYTE [DISTWIDTH] IN BLOOD BY AUTOMATED COUNT: 13.4 % (ref 11.5–14.5)
EST. GFR  (AFRICAN AMERICAN): >60 ML/MIN/1.73 M^2
EST. GFR  (NON AFRICAN AMERICAN): 52.4 ML/MIN/1.73 M^2
FRACTIONAL SHORTENING: 9 % (ref 28–44)
GLUCOSE SERPL-MCNC: 145 MG/DL (ref 70–110)
HCT VFR BLD AUTO: 37.5 % (ref 40–54)
HGB BLD-MCNC: 11.9 G/DL (ref 14–18)
IMM GRANULOCYTES # BLD AUTO: 0.02 K/UL (ref 0–0.04)
IMM GRANULOCYTES NFR BLD AUTO: 0.2 % (ref 0–0.5)
INTERVENTRICULAR SEPTUM: 0.81 CM (ref 0.6–1.1)
IVRT: 125.59 MSEC
LA MAJOR: 5.53 CM
LA MINOR: 5.52 CM
LA WIDTH: 4.37 CM
LEFT ATRIUM SIZE: 3.04 CM
LEFT ATRIUM VOLUME INDEX MOD: 46.8 ML/M2
LEFT ATRIUM VOLUME INDEX: 30.4 ML/M2
LEFT ATRIUM VOLUME MOD: 96 CM3
LEFT ATRIUM VOLUME: 62.39 CM3
LEFT INTERNAL DIMENSION IN SYSTOLE: 5.71 CM (ref 2.1–4)
LEFT VENTRICLE DIASTOLIC VOLUME INDEX: 96.65 ML/M2
LEFT VENTRICLE DIASTOLIC VOLUME: 198.44 ML
LEFT VENTRICLE MASS INDEX: 97 G/M2
LEFT VENTRICLE SYSTOLIC VOLUME INDEX: 78.2 ML/M2
LEFT VENTRICLE SYSTOLIC VOLUME: 160.6 ML
LEFT VENTRICULAR INTERNAL DIMENSION IN DIASTOLE: 6.26 CM (ref 3.5–6)
LEFT VENTRICULAR MASS: 198.97 G
LV LATERAL E/E' RATIO: 11.5 M/S
LV SEPTAL E/E' RATIO: 11.5 M/S
LYMPHOCYTES # BLD AUTO: 2.3 K/UL (ref 1–4.8)
LYMPHOCYTES NFR BLD: 27.6 % (ref 18–48)
MCH RBC QN AUTO: 29.6 PG (ref 27–31)
MCHC RBC AUTO-ENTMCNC: 31.7 G/DL (ref 32–36)
MCV RBC AUTO: 93 FL (ref 82–98)
MONOCYTES # BLD AUTO: 0.9 K/UL (ref 0.3–1)
MONOCYTES NFR BLD: 10.5 % (ref 4–15)
MV A" WAVE DURATION": 4.95 MSEC
MV PEAK A VEL: 0.6 M/S
MV PEAK E VEL: 0.46 M/S
NEUTROPHILS # BLD AUTO: 4.9 K/UL (ref 1.8–7.7)
NEUTROPHILS NFR BLD: 58 % (ref 38–73)
NRBC BLD-RTO: 0 /100 WBC
PISA TR MAX VEL: 2.42 M/S
PLATELET # BLD AUTO: 177 K/UL (ref 150–350)
PMV BLD AUTO: 10.1 FL (ref 9.2–12.9)
POCT GLUCOSE: 116 MG/DL (ref 70–110)
POCT GLUCOSE: 143 MG/DL (ref 70–110)
POCT GLUCOSE: 168 MG/DL (ref 70–110)
POCT GLUCOSE: 208 MG/DL (ref 70–110)
POTASSIUM SERPL-SCNC: 3.9 MMOL/L (ref 3.5–5.1)
PULM VEIN S/D RATIO: 1.43
PV PEAK D VEL: 0.28 M/S
PV PEAK S VEL: 0.4 M/S
RA MAJOR: 4.57 CM
RA PRESSURE: 3 MMHG
RA WIDTH: 4.63 CM
RBC # BLD AUTO: 4.02 M/UL (ref 4.6–6.2)
RIGHT VENTRICULAR END-DIASTOLIC DIMENSION: 3.58 CM
RV TISSUE DOPPLER FREE WALL SYSTOLIC VELOCITY 1 (APICAL 4 CHAMBER VIEW): 9.71 CM/S
SINUS: 3.75 CM
SODIUM SERPL-SCNC: 137 MMOL/L (ref 136–145)
STJ: 2.98 CM
TDI LATERAL: 0.04 M/S
TDI SEPTAL: 0.04 M/S
TDI: 0.04 M/S
TR MAX PG: 23 MMHG
TRICUSPID ANNULAR PLANE SYSTOLIC EXCURSION: 1.46 CM
TV REST PULMONARY ARTERY PRESSURE: 26 MMHG
WBC # BLD AUTO: 8.41 K/UL (ref 3.9–12.7)

## 2021-01-10 PROCEDURE — 25000242 PHARM REV CODE 250 ALT 637 W/ HCPCS: Performed by: HOSPITALIST

## 2021-01-10 PROCEDURE — 80048 BASIC METABOLIC PNL TOTAL CA: CPT

## 2021-01-10 PROCEDURE — 20600001 HC STEP DOWN PRIVATE ROOM

## 2021-01-10 PROCEDURE — 99233 PR SUBSEQUENT HOSPITAL CARE,LEVL III: ICD-10-PCS | Mod: ,,, | Performed by: INTERNAL MEDICINE

## 2021-01-10 PROCEDURE — 36415 COLL VENOUS BLD VENIPUNCTURE: CPT

## 2021-01-10 PROCEDURE — 25000003 PHARM REV CODE 250: Performed by: INTERNAL MEDICINE

## 2021-01-10 PROCEDURE — 99233 SBSQ HOSP IP/OBS HIGH 50: CPT | Mod: ,,, | Performed by: INTERNAL MEDICINE

## 2021-01-10 PROCEDURE — 63600175 PHARM REV CODE 636 W HCPCS: Performed by: HOSPITALIST

## 2021-01-10 PROCEDURE — 63600175 PHARM REV CODE 636 W HCPCS: Performed by: INTERNAL MEDICINE

## 2021-01-10 PROCEDURE — 25000003 PHARM REV CODE 250: Performed by: HOSPITALIST

## 2021-01-10 PROCEDURE — 85025 COMPLETE CBC W/AUTO DIFF WBC: CPT

## 2021-01-10 RX ORDER — POTASSIUM CHLORIDE 20 MEQ/1
40 TABLET, EXTENDED RELEASE ORAL DAILY
Status: DISCONTINUED | OUTPATIENT
Start: 2021-01-11 | End: 2021-01-13

## 2021-01-10 RX ORDER — FUROSEMIDE 40 MG/1
40 TABLET ORAL DAILY
Status: DISCONTINUED | OUTPATIENT
Start: 2021-01-10 | End: 2021-01-10

## 2021-01-10 RX ORDER — FUROSEMIDE 40 MG/1
40 TABLET ORAL 2 TIMES DAILY
Status: DISCONTINUED | OUTPATIENT
Start: 2021-01-10 | End: 2021-01-11

## 2021-01-10 RX ADMIN — ASPIRIN 81 MG: 81 TABLET, COATED ORAL at 09:01

## 2021-01-10 RX ADMIN — FENOFIBRATE 160 MG: 160 TABLET ORAL at 09:01

## 2021-01-10 RX ADMIN — FUROSEMIDE 40 MG: 40 TABLET ORAL at 01:01

## 2021-01-10 RX ADMIN — FUROSEMIDE 40 MG: 40 TABLET ORAL at 06:01

## 2021-01-10 RX ADMIN — FUROSEMIDE 10 MG/HR: 10 INJECTION, SOLUTION INTRAMUSCULAR; INTRAVENOUS at 07:01

## 2021-01-10 RX ADMIN — ATORVASTATIN CALCIUM 80 MG: 20 TABLET, FILM COATED ORAL at 08:01

## 2021-01-10 RX ADMIN — PANTOPRAZOLE SODIUM 40 MG: 40 TABLET, DELAYED RELEASE ORAL at 09:01

## 2021-01-10 RX ADMIN — INSULIN ASPART 4 UNITS: 100 INJECTION, SOLUTION INTRAVENOUS; SUBCUTANEOUS at 11:01

## 2021-01-10 RX ADMIN — POTASSIUM CHLORIDE 40 MEQ: 1500 TABLET, EXTENDED RELEASE ORAL at 09:01

## 2021-01-11 PROBLEM — Z71.89 COUNSELING REGARDING ADVANCED CARE PLANNING AND GOALS OF CARE: Status: ACTIVE | Noted: 2021-01-11

## 2021-01-11 PROBLEM — Z51.5 ENCOUNTER FOR PALLIATIVE CARE: Status: ACTIVE | Noted: 2021-01-11

## 2021-01-11 PROBLEM — N17.9 AKI (ACUTE KIDNEY INJURY): Status: ACTIVE | Noted: 2021-01-11

## 2021-01-11 LAB
ANION GAP SERPL CALC-SCNC: 10 MMOL/L (ref 8–16)
BASOPHILS # BLD AUTO: 0.03 K/UL (ref 0–0.2)
BASOPHILS NFR BLD: 0.4 % (ref 0–1.9)
BUN SERPL-MCNC: 34 MG/DL (ref 8–23)
CALCIUM SERPL-MCNC: 9.5 MG/DL (ref 8.7–10.5)
CHLORIDE SERPL-SCNC: 100 MMOL/L (ref 95–110)
CO2 SERPL-SCNC: 25 MMOL/L (ref 23–29)
CREAT SERPL-MCNC: 1.6 MG/DL (ref 0.5–1.4)
DIFFERENTIAL METHOD: ABNORMAL
EOSINOPHIL # BLD AUTO: 0.2 K/UL (ref 0–0.5)
EOSINOPHIL NFR BLD: 2.8 % (ref 0–8)
ERYTHROCYTE [DISTWIDTH] IN BLOOD BY AUTOMATED COUNT: 13.4 % (ref 11.5–14.5)
EST. GFR  (AFRICAN AMERICAN): 51.5 ML/MIN/1.73 M^2
EST. GFR  (NON AFRICAN AMERICAN): 44.5 ML/MIN/1.73 M^2
GLUCOSE SERPL-MCNC: 133 MG/DL (ref 70–110)
HCT VFR BLD AUTO: 36.3 % (ref 40–54)
HGB BLD-MCNC: 11.5 G/DL (ref 14–18)
IMM GRANULOCYTES # BLD AUTO: 0.01 K/UL (ref 0–0.04)
IMM GRANULOCYTES NFR BLD AUTO: 0.1 % (ref 0–0.5)
LYMPHOCYTES # BLD AUTO: 2.3 K/UL (ref 1–4.8)
LYMPHOCYTES NFR BLD: 28.5 % (ref 18–48)
MCH RBC QN AUTO: 30 PG (ref 27–31)
MCHC RBC AUTO-ENTMCNC: 31.7 G/DL (ref 32–36)
MCV RBC AUTO: 95 FL (ref 82–98)
MONOCYTES # BLD AUTO: 0.9 K/UL (ref 0.3–1)
MONOCYTES NFR BLD: 11.9 % (ref 4–15)
NEUTROPHILS # BLD AUTO: 4.4 K/UL (ref 1.8–7.7)
NEUTROPHILS NFR BLD: 56.3 % (ref 38–73)
NRBC BLD-RTO: 0 /100 WBC
PLATELET # BLD AUTO: 176 K/UL (ref 150–350)
PMV BLD AUTO: 10.8 FL (ref 9.2–12.9)
POCT GLUCOSE: 143 MG/DL (ref 70–110)
POCT GLUCOSE: 146 MG/DL (ref 70–110)
POCT GLUCOSE: 146 MG/DL (ref 70–110)
POCT GLUCOSE: 162 MG/DL (ref 70–110)
POTASSIUM SERPL-SCNC: 3.9 MMOL/L (ref 3.5–5.1)
RBC # BLD AUTO: 3.83 M/UL (ref 4.6–6.2)
SARS-COV-2 RNA RESP QL NAA+PROBE: NOT DETECTED
SODIUM SERPL-SCNC: 135 MMOL/L (ref 136–145)
WBC # BLD AUTO: 7.89 K/UL (ref 3.9–12.7)

## 2021-01-11 PROCEDURE — 99497 PR ADVNCD CARE PLAN 30 MIN: ICD-10-PCS | Mod: 25,,, | Performed by: NURSE PRACTITIONER

## 2021-01-11 PROCEDURE — 99497 ADVNCD CARE PLAN 30 MIN: CPT | Mod: 25,,, | Performed by: NURSE PRACTITIONER

## 2021-01-11 PROCEDURE — 20600001 HC STEP DOWN PRIVATE ROOM

## 2021-01-11 PROCEDURE — 99233 PR SUBSEQUENT HOSPITAL CARE,LEVL III: ICD-10-PCS | Mod: ,,, | Performed by: INTERNAL MEDICINE

## 2021-01-11 PROCEDURE — 80048 BASIC METABOLIC PNL TOTAL CA: CPT

## 2021-01-11 PROCEDURE — 25000003 PHARM REV CODE 250: Performed by: STUDENT IN AN ORGANIZED HEALTH CARE EDUCATION/TRAINING PROGRAM

## 2021-01-11 PROCEDURE — 93750 INTERROGATION VAD IN PERSON: CPT | Mod: ,,, | Performed by: INTERNAL MEDICINE

## 2021-01-11 PROCEDURE — U0003 INFECTIOUS AGENT DETECTION BY NUCLEIC ACID (DNA OR RNA); SEVERE ACUTE RESPIRATORY SYNDROME CORONAVIRUS 2 (SARS-COV-2) (CORONAVIRUS DISEASE [COVID-19]), AMPLIFIED PROBE TECHNIQUE, MAKING USE OF HIGH THROUGHPUT TECHNOLOGIES AS DESCRIBED BY CMS-2020-01-R: HCPCS

## 2021-01-11 PROCEDURE — 99223 1ST HOSP IP/OBS HIGH 75: CPT | Mod: ,,, | Performed by: NURSE PRACTITIONER

## 2021-01-11 PROCEDURE — 85025 COMPLETE CBC W/AUTO DIFF WBC: CPT

## 2021-01-11 PROCEDURE — 25000242 PHARM REV CODE 250 ALT 637 W/ HCPCS: Performed by: HOSPITALIST

## 2021-01-11 PROCEDURE — 25000003 PHARM REV CODE 250: Performed by: HOSPITALIST

## 2021-01-11 PROCEDURE — 99223 PR INITIAL HOSPITAL CARE,LEVL III: ICD-10-PCS | Mod: ,,, | Performed by: NURSE PRACTITIONER

## 2021-01-11 PROCEDURE — 36415 COLL VENOUS BLD VENIPUNCTURE: CPT

## 2021-01-11 PROCEDURE — 93750 PR INTERROGATE VENT ASSIST DEV, IN PERSON, W PHYSICIAN ANALYSIS: ICD-10-PCS | Mod: ,,, | Performed by: INTERNAL MEDICINE

## 2021-01-11 PROCEDURE — 99233 SBSQ HOSP IP/OBS HIGH 50: CPT | Mod: ,,, | Performed by: INTERNAL MEDICINE

## 2021-01-11 RX ORDER — FUROSEMIDE 40 MG/1
40 TABLET ORAL 2 TIMES DAILY
Status: DISCONTINUED | OUTPATIENT
Start: 2021-01-11 | End: 2021-01-11

## 2021-01-11 RX ADMIN — ATORVASTATIN CALCIUM 80 MG: 20 TABLET, FILM COATED ORAL at 09:01

## 2021-01-11 RX ADMIN — FUROSEMIDE 40 MG: 40 TABLET ORAL at 10:01

## 2021-01-11 RX ADMIN — FENOFIBRATE 160 MG: 160 TABLET ORAL at 09:01

## 2021-01-11 RX ADMIN — PANTOPRAZOLE SODIUM 40 MG: 40 TABLET, DELAYED RELEASE ORAL at 09:01

## 2021-01-11 RX ADMIN — POTASSIUM CHLORIDE 40 MEQ: 1500 TABLET, EXTENDED RELEASE ORAL at 09:01

## 2021-01-11 RX ADMIN — ASPIRIN 81 MG: 81 TABLET, COATED ORAL at 09:01

## 2021-01-11 RX ADMIN — INSULIN ASPART 2 UNITS: 100 INJECTION, SOLUTION INTRAVENOUS; SUBCUTANEOUS at 04:01

## 2021-01-12 ENCOUNTER — ANESTHESIA EVENT (OUTPATIENT)
Dept: SURGERY | Facility: HOSPITAL | Age: 66
DRG: 001 | End: 2021-01-12
Payer: MEDICARE

## 2021-01-12 ENCOUNTER — DOCUMENTATION ONLY (OUTPATIENT)
Dept: CARDIOTHORACIC SURGERY | Facility: CLINIC | Age: 66
End: 2021-01-12

## 2021-01-12 LAB
ABO + RH BLD: NORMAL
ANION GAP SERPL CALC-SCNC: 10 MMOL/L (ref 8–16)
BASOPHILS # BLD AUTO: 0.03 K/UL (ref 0–0.2)
BASOPHILS NFR BLD: 0.4 % (ref 0–1.9)
BLD GP AB SCN CELLS X3 SERPL QL: NORMAL
BUN SERPL-MCNC: 39 MG/DL (ref 8–23)
CALCIUM SERPL-MCNC: 9.5 MG/DL (ref 8.7–10.5)
CHLORIDE SERPL-SCNC: 103 MMOL/L (ref 95–110)
CO2 SERPL-SCNC: 26 MMOL/L (ref 23–29)
CREAT SERPL-MCNC: 1.4 MG/DL (ref 0.5–1.4)
DIFFERENTIAL METHOD: ABNORMAL
EOSINOPHIL # BLD AUTO: 0.2 K/UL (ref 0–0.5)
EOSINOPHIL NFR BLD: 3.3 % (ref 0–8)
ERYTHROCYTE [DISTWIDTH] IN BLOOD BY AUTOMATED COUNT: 13.4 % (ref 11.5–14.5)
EST. GFR  (AFRICAN AMERICAN): >60 ML/MIN/1.73 M^2
EST. GFR  (NON AFRICAN AMERICAN): 52.4 ML/MIN/1.73 M^2
GLUCOSE SERPL-MCNC: 140 MG/DL (ref 70–110)
HCT VFR BLD AUTO: 35.7 % (ref 40–54)
HGB BLD-MCNC: 11.4 G/DL (ref 14–18)
IMM GRANULOCYTES # BLD AUTO: 0.02 K/UL (ref 0–0.04)
IMM GRANULOCYTES NFR BLD AUTO: 0.3 % (ref 0–0.5)
INR PPP: 1 (ref 0.8–1.2)
LYMPHOCYTES # BLD AUTO: 2.1 K/UL (ref 1–4.8)
LYMPHOCYTES NFR BLD: 29 % (ref 18–48)
MCH RBC QN AUTO: 29.5 PG (ref 27–31)
MCHC RBC AUTO-ENTMCNC: 31.9 G/DL (ref 32–36)
MCV RBC AUTO: 93 FL (ref 82–98)
MONOCYTES # BLD AUTO: 0.9 K/UL (ref 0.3–1)
MONOCYTES NFR BLD: 12.9 % (ref 4–15)
NEUTROPHILS # BLD AUTO: 4 K/UL (ref 1.8–7.7)
NEUTROPHILS NFR BLD: 54.1 % (ref 38–73)
NRBC BLD-RTO: 0 /100 WBC
PLATELET # BLD AUTO: 171 K/UL (ref 150–350)
PMV BLD AUTO: 10.6 FL (ref 9.2–12.9)
POCT GLUCOSE: 119 MG/DL (ref 70–110)
POCT GLUCOSE: 132 MG/DL (ref 70–110)
POCT GLUCOSE: 138 MG/DL (ref 70–110)
POCT GLUCOSE: 182 MG/DL (ref 70–110)
POTASSIUM SERPL-SCNC: 4.3 MMOL/L (ref 3.5–5.1)
PROTHROMBIN TIME: 11 SEC (ref 9–12.5)
RBC # BLD AUTO: 3.86 M/UL (ref 4.6–6.2)
SODIUM SERPL-SCNC: 139 MMOL/L (ref 136–145)
WBC # BLD AUTO: 7.3 K/UL (ref 3.9–12.7)

## 2021-01-12 PROCEDURE — 63600175 PHARM REV CODE 636 W HCPCS: Performed by: PHYSICIAN ASSISTANT

## 2021-01-12 PROCEDURE — 25000003 PHARM REV CODE 250: Performed by: HOSPITALIST

## 2021-01-12 PROCEDURE — 99233 SBSQ HOSP IP/OBS HIGH 50: CPT | Mod: ,,, | Performed by: INTERNAL MEDICINE

## 2021-01-12 PROCEDURE — 25000242 PHARM REV CODE 250 ALT 637 W/ HCPCS: Performed by: HOSPITALIST

## 2021-01-12 PROCEDURE — 85610 PROTHROMBIN TIME: CPT

## 2021-01-12 PROCEDURE — 36415 COLL VENOUS BLD VENIPUNCTURE: CPT

## 2021-01-12 PROCEDURE — 85025 COMPLETE CBC W/AUTO DIFF WBC: CPT

## 2021-01-12 PROCEDURE — 86920 COMPATIBILITY TEST SPIN: CPT

## 2021-01-12 PROCEDURE — 80048 BASIC METABOLIC PNL TOTAL CA: CPT

## 2021-01-12 PROCEDURE — 86900 BLOOD TYPING SEROLOGIC ABO: CPT

## 2021-01-12 PROCEDURE — 99233 PR SUBSEQUENT HOSPITAL CARE,LEVL III: ICD-10-PCS | Mod: ,,, | Performed by: INTERNAL MEDICINE

## 2021-01-12 PROCEDURE — 25000003 PHARM REV CODE 250: Performed by: PHYSICIAN ASSISTANT

## 2021-01-12 PROCEDURE — 20600001 HC STEP DOWN PRIVATE ROOM

## 2021-01-12 RX ORDER — SYRING-NEEDL,DISP,INSUL,0.3 ML 29 G X1/2"
148 SYRINGE, EMPTY DISPOSABLE MISCELLANEOUS ONCE
Status: DISCONTINUED | OUTPATIENT
Start: 2021-01-12 | End: 2021-01-12

## 2021-01-12 RX ADMIN — ATORVASTATIN CALCIUM 80 MG: 20 TABLET, FILM COATED ORAL at 09:01

## 2021-01-12 RX ADMIN — FENOFIBRATE 160 MG: 160 TABLET ORAL at 09:01

## 2021-01-12 RX ADMIN — ASPIRIN 81 MG: 81 TABLET, COATED ORAL at 09:01

## 2021-01-12 RX ADMIN — PHYTONADIONE 10 MG: 10 INJECTION, EMULSION INTRAMUSCULAR; INTRAVENOUS; SUBCUTANEOUS at 05:01

## 2021-01-12 RX ADMIN — POTASSIUM CHLORIDE 40 MEQ: 1500 TABLET, EXTENDED RELEASE ORAL at 09:01

## 2021-01-12 RX ADMIN — PANTOPRAZOLE SODIUM 40 MG: 40 TABLET, DELAYED RELEASE ORAL at 09:01

## 2021-01-13 ENCOUNTER — ANESTHESIA EVENT (OUTPATIENT)
Dept: SURGERY | Facility: HOSPITAL | Age: 66
DRG: 001 | End: 2021-01-13
Payer: MEDICARE

## 2021-01-13 ENCOUNTER — DOCUMENTATION ONLY (OUTPATIENT)
Dept: CARDIOLOGY | Facility: HOSPITAL | Age: 66
End: 2021-01-13

## 2021-01-13 ENCOUNTER — ANESTHESIA (OUTPATIENT)
Dept: SURGERY | Facility: HOSPITAL | Age: 66
DRG: 001 | End: 2021-01-13
Payer: MEDICARE

## 2021-01-13 PROBLEM — Z95.811 LVAD (LEFT VENTRICULAR ASSIST DEVICE) PRESENT: Status: ACTIVE | Noted: 2021-01-13

## 2021-01-13 PROBLEM — Z20.822 LAB TEST NEGATIVE FOR COVID-19 VIRUS: Status: ACTIVE | Noted: 2021-01-13

## 2021-01-13 LAB
ALBUMIN SERPL BCP-MCNC: 3.4 G/DL (ref 3.5–5.2)
ALLENS TEST: ABNORMAL
ALP SERPL-CCNC: 62 U/L (ref 55–135)
ALT SERPL W/O P-5'-P-CCNC: 6 U/L (ref 10–44)
ANION GAP SERPL CALC-SCNC: 10 MMOL/L (ref 8–16)
ANION GAP SERPL CALC-SCNC: 12 MMOL/L (ref 8–16)
ANION GAP SERPL CALC-SCNC: 8 MMOL/L (ref 8–16)
ANION GAP SERPL CALC-SCNC: 9 MMOL/L (ref 8–16)
APTT BLDCRRT: 25.4 SEC (ref 21–32)
APTT BLDCRRT: 27.4 SEC (ref 21–32)
APTT BLDCRRT: 33 SEC (ref 21–32)
AST SERPL-CCNC: 15 U/L (ref 10–40)
BASOPHILS # BLD AUTO: 0.01 K/UL (ref 0–0.2)
BASOPHILS # BLD AUTO: 0.02 K/UL (ref 0–0.2)
BASOPHILS # BLD AUTO: 0.02 K/UL (ref 0–0.2)
BASOPHILS # BLD AUTO: 0.03 K/UL (ref 0–0.2)
BASOPHILS # BLD AUTO: 0.03 K/UL (ref 0–0.2)
BASOPHILS NFR BLD: 0.1 % (ref 0–1.9)
BASOPHILS NFR BLD: 0.1 % (ref 0–1.9)
BASOPHILS NFR BLD: 0.2 % (ref 0–1.9)
BASOPHILS NFR BLD: 0.3 % (ref 0–1.9)
BASOPHILS NFR BLD: 0.4 % (ref 0–1.9)
BILIRUB DIRECT SERPL-MCNC: 0.5 MG/DL (ref 0.1–0.3)
BILIRUB SERPL-MCNC: 1 MG/DL (ref 0.1–1)
BLD PROD TYP BPU: NORMAL
BLOOD UNIT EXPIRATION DATE: NORMAL
BLOOD UNIT TYPE CODE: 9500
BLOOD UNIT TYPE: NORMAL
BUN SERPL-MCNC: 32 MG/DL (ref 8–23)
BUN SERPL-MCNC: 33 MG/DL (ref 8–23)
BUN SERPL-MCNC: 34 MG/DL (ref 8–23)
BUN SERPL-MCNC: 36 MG/DL (ref 8–23)
CALCIUM SERPL-MCNC: 8.6 MG/DL (ref 8.7–10.5)
CALCIUM SERPL-MCNC: 8.8 MG/DL (ref 8.7–10.5)
CALCIUM SERPL-MCNC: 9 MG/DL (ref 8.7–10.5)
CALCIUM SERPL-MCNC: 9.5 MG/DL (ref 8.7–10.5)
CHLORIDE SERPL-SCNC: 104 MMOL/L (ref 95–110)
CHLORIDE SERPL-SCNC: 105 MMOL/L (ref 95–110)
CHLORIDE SERPL-SCNC: 107 MMOL/L (ref 95–110)
CHLORIDE SERPL-SCNC: 108 MMOL/L (ref 95–110)
CO2 SERPL-SCNC: 21 MMOL/L (ref 23–29)
CO2 SERPL-SCNC: 23 MMOL/L (ref 23–29)
CO2 SERPL-SCNC: 23 MMOL/L (ref 23–29)
CO2 SERPL-SCNC: 24 MMOL/L (ref 23–29)
CODING SYSTEM: NORMAL
CREAT SERPL-MCNC: 1.2 MG/DL (ref 0.5–1.4)
CREAT SERPL-MCNC: 1.4 MG/DL (ref 0.5–1.4)
DELSYS: ABNORMAL
DELSYS: ABNORMAL
DIFFERENTIAL METHOD: ABNORMAL
DISPENSE STATUS: NORMAL
EOSINOPHIL # BLD AUTO: 0 K/UL (ref 0–0.5)
EOSINOPHIL # BLD AUTO: 0 K/UL (ref 0–0.5)
EOSINOPHIL # BLD AUTO: 0.1 K/UL (ref 0–0.5)
EOSINOPHIL # BLD AUTO: 0.2 K/UL (ref 0–0.5)
EOSINOPHIL # BLD AUTO: 0.2 K/UL (ref 0–0.5)
EOSINOPHIL NFR BLD: 0 % (ref 0–8)
EOSINOPHIL NFR BLD: 0.1 % (ref 0–8)
EOSINOPHIL NFR BLD: 0.6 % (ref 0–8)
EOSINOPHIL NFR BLD: 1.7 % (ref 0–8)
EOSINOPHIL NFR BLD: 2.7 % (ref 0–8)
ERYTHROCYTE [DISTWIDTH] IN BLOOD BY AUTOMATED COUNT: 13.2 % (ref 11.5–14.5)
ERYTHROCYTE [DISTWIDTH] IN BLOOD BY AUTOMATED COUNT: 13.3 % (ref 11.5–14.5)
ERYTHROCYTE [DISTWIDTH] IN BLOOD BY AUTOMATED COUNT: 13.3 % (ref 11.5–14.5)
ERYTHROCYTE [DISTWIDTH] IN BLOOD BY AUTOMATED COUNT: 13.4 % (ref 11.5–14.5)
ERYTHROCYTE [DISTWIDTH] IN BLOOD BY AUTOMATED COUNT: 13.5 % (ref 11.5–14.5)
ERYTHROCYTE [SEDIMENTATION RATE] IN BLOOD BY WESTERGREN METHOD: 16 MM/H
ERYTHROCYTE [SEDIMENTATION RATE] IN BLOOD BY WESTERGREN METHOD: 16 MM/H
EST. GFR  (AFRICAN AMERICAN): >60 ML/MIN/1.73 M^2
EST. GFR  (NON AFRICAN AMERICAN): 52.4 ML/MIN/1.73 M^2
EST. GFR  (NON AFRICAN AMERICAN): >60 ML/MIN/1.73 M^2
FIBRINOGEN PPP-MCNC: 231 MG/DL (ref 182–366)
FIO2: 100
FIO2: 40
FIO2: 40
FIO2: 65
GLUCOSE SERPL-MCNC: 136 MG/DL (ref 70–110)
GLUCOSE SERPL-MCNC: 142 MG/DL (ref 70–110)
GLUCOSE SERPL-MCNC: 146 MG/DL (ref 70–110)
GLUCOSE SERPL-MCNC: 173 MG/DL (ref 70–110)
GLUCOSE SERPL-MCNC: 187 MG/DL (ref 70–110)
GLUCOSE SERPL-MCNC: 236 MG/DL (ref 70–110)
GLUCOSE SERPL-MCNC: 261 MG/DL (ref 70–110)
GLUCOSE SERPL-MCNC: 263 MG/DL (ref 70–110)
GLUCOSE SERPL-MCNC: 276 MG/DL (ref 70–110)
HCO3 UR-SCNC: 21.7 MMOL/L (ref 24–28)
HCO3 UR-SCNC: 22.5 MMOL/L (ref 24–28)
HCO3 UR-SCNC: 23.2 MMOL/L (ref 24–28)
HCO3 UR-SCNC: 23.5 MMOL/L (ref 24–28)
HCO3 UR-SCNC: 23.7 MMOL/L (ref 24–28)
HCO3 UR-SCNC: 24 MMOL/L (ref 24–28)
HCO3 UR-SCNC: 25.3 MMOL/L (ref 24–28)
HCO3 UR-SCNC: 26 MMOL/L (ref 24–28)
HCO3 UR-SCNC: 26.4 MMOL/L (ref 24–28)
HCO3 UR-SCNC: 27.1 MMOL/L (ref 24–28)
HCT VFR BLD AUTO: 22.6 % (ref 40–54)
HCT VFR BLD AUTO: 24.7 % (ref 40–54)
HCT VFR BLD AUTO: 26.1 % (ref 40–54)
HCT VFR BLD AUTO: 26.9 % (ref 40–54)
HCT VFR BLD AUTO: 35.6 % (ref 40–54)
HCT VFR BLD CALC: 21 %PCV (ref 36–54)
HCT VFR BLD CALC: 21 %PCV (ref 36–54)
HCT VFR BLD CALC: 23 %PCV (ref 36–54)
HCT VFR BLD CALC: 24 %PCV (ref 36–54)
HCT VFR BLD CALC: 24 %PCV (ref 36–54)
HCT VFR BLD CALC: 25 %PCV (ref 36–54)
HCT VFR BLD CALC: 26 %PCV (ref 36–54)
HCT VFR BLD CALC: 26 %PCV (ref 36–54)
HCT VFR BLD CALC: 27 %PCV (ref 36–54)
HCT VFR BLD CALC: 28 %PCV (ref 36–54)
HCT VFR BLD CALC: 29 %PCV (ref 36–54)
HGB BLD-MCNC: 11.1 G/DL (ref 14–18)
HGB BLD-MCNC: 7.5 G/DL (ref 14–18)
HGB BLD-MCNC: 7.9 G/DL (ref 14–18)
HGB BLD-MCNC: 8.3 G/DL (ref 14–18)
HGB BLD-MCNC: 8.5 G/DL (ref 14–18)
IMM GRANULOCYTES # BLD AUTO: 0.02 K/UL (ref 0–0.04)
IMM GRANULOCYTES # BLD AUTO: 0.04 K/UL (ref 0–0.04)
IMM GRANULOCYTES # BLD AUTO: 0.04 K/UL (ref 0–0.04)
IMM GRANULOCYTES # BLD AUTO: 0.07 K/UL (ref 0–0.04)
IMM GRANULOCYTES # BLD AUTO: 0.16 K/UL (ref 0–0.04)
IMM GRANULOCYTES NFR BLD AUTO: 0.3 % (ref 0–0.5)
IMM GRANULOCYTES NFR BLD AUTO: 0.4 % (ref 0–0.5)
IMM GRANULOCYTES NFR BLD AUTO: 0.4 % (ref 0–0.5)
IMM GRANULOCYTES NFR BLD AUTO: 0.5 % (ref 0–0.5)
IMM GRANULOCYTES NFR BLD AUTO: 0.8 % (ref 0–0.5)
INR PPP: 1.1 (ref 0.8–1.2)
INR PPP: 1.1 (ref 0.8–1.2)
INR PPP: 1.2 (ref 0.8–1.2)
INR PPP: 1.7 (ref 0.8–1.2)
LDH SERPL L TO P-CCNC: 1.33 MMOL/L (ref 0.36–1.25)
LDH SERPL L TO P-CCNC: 1.38 MMOL/L (ref 0.36–1.25)
LDH SERPL L TO P-CCNC: 2.11 MMOL/L (ref 0.36–1.25)
LDH SERPL L TO P-CCNC: 3.12 MMOL/L (ref 0.36–1.25)
LDH SERPL L TO P-CCNC: 3.78 MMOL/L (ref 0.36–1.25)
LDH SERPL L TO P-CCNC: 305 U/L (ref 110–260)
LDH SERPL L TO P-CCNC: 4.63 MMOL/L (ref 0.36–1.25)
LYMPHOCYTES # BLD AUTO: 0.9 K/UL (ref 1–4.8)
LYMPHOCYTES # BLD AUTO: 0.9 K/UL (ref 1–4.8)
LYMPHOCYTES # BLD AUTO: 2 K/UL (ref 1–4.8)
LYMPHOCYTES # BLD AUTO: 2.3 K/UL (ref 1–4.8)
LYMPHOCYTES # BLD AUTO: 3.8 K/UL (ref 1–4.8)
LYMPHOCYTES NFR BLD: 12 % (ref 18–48)
LYMPHOCYTES NFR BLD: 26.7 % (ref 18–48)
LYMPHOCYTES NFR BLD: 33.9 % (ref 18–48)
LYMPHOCYTES NFR BLD: 6.6 % (ref 18–48)
LYMPHOCYTES NFR BLD: 8.1 % (ref 18–48)
MAGNESIUM SERPL-MCNC: 2.4 MG/DL (ref 1.6–2.6)
MAGNESIUM SERPL-MCNC: 2.5 MG/DL (ref 1.6–2.6)
MAGNESIUM SERPL-MCNC: 2.9 MG/DL (ref 1.6–2.6)
MCH RBC QN AUTO: 29.8 PG (ref 27–31)
MCH RBC QN AUTO: 30.2 PG (ref 27–31)
MCH RBC QN AUTO: 30.3 PG (ref 27–31)
MCH RBC QN AUTO: 30.6 PG (ref 27–31)
MCH RBC QN AUTO: 31.1 PG (ref 27–31)
MCHC RBC AUTO-ENTMCNC: 31.2 G/DL (ref 32–36)
MCHC RBC AUTO-ENTMCNC: 31.6 G/DL (ref 32–36)
MCHC RBC AUTO-ENTMCNC: 31.8 G/DL (ref 32–36)
MCHC RBC AUTO-ENTMCNC: 32 G/DL (ref 32–36)
MCHC RBC AUTO-ENTMCNC: 33.2 G/DL (ref 32–36)
MCV RBC AUTO: 94 FL (ref 82–98)
MCV RBC AUTO: 94 FL (ref 82–98)
MCV RBC AUTO: 95 FL (ref 82–98)
MCV RBC AUTO: 95 FL (ref 82–98)
MCV RBC AUTO: 97 FL (ref 82–98)
MIN VOL: 8
MIN VOL: 8
MODE: ABNORMAL
MODE: ABNORMAL
MONOCYTES # BLD AUTO: 0.9 K/UL (ref 0.3–1)
MONOCYTES # BLD AUTO: 1 K/UL (ref 0.3–1)
MONOCYTES # BLD AUTO: 1.4 K/UL (ref 0.3–1)
MONOCYTES # BLD AUTO: 1.5 K/UL (ref 0.3–1)
MONOCYTES # BLD AUTO: 1.5 K/UL (ref 0.3–1)
MONOCYTES NFR BLD: 11.2 % (ref 4–15)
MONOCYTES NFR BLD: 12.8 % (ref 4–15)
MONOCYTES NFR BLD: 12.9 % (ref 4–15)
MONOCYTES NFR BLD: 7.7 % (ref 4–15)
MONOCYTES NFR BLD: 8 % (ref 4–15)
NEUTROPHILS # BLD AUTO: 10.8 K/UL (ref 1.8–7.7)
NEUTROPHILS # BLD AUTO: 15.3 K/UL (ref 1.8–7.7)
NEUTROPHILS # BLD AUTO: 4.4 K/UL (ref 1.8–7.7)
NEUTROPHILS # BLD AUTO: 6.3 K/UL (ref 1.8–7.7)
NEUTROPHILS # BLD AUTO: 8.5 K/UL (ref 1.8–7.7)
NEUTROPHILS NFR BLD: 55.7 % (ref 38–73)
NEUTROPHILS NFR BLD: 57 % (ref 38–73)
NEUTROPHILS NFR BLD: 78.6 % (ref 38–73)
NEUTROPHILS NFR BLD: 78.8 % (ref 38–73)
NEUTROPHILS NFR BLD: 81.4 % (ref 38–73)
NRBC BLD-RTO: 0 /100 WBC
PCO2 BLDA: 37.9 MMHG (ref 35–45)
PCO2 BLDA: 38.7 MMHG (ref 35–45)
PCO2 BLDA: 40.4 MMHG (ref 35–45)
PCO2 BLDA: 41.2 MMHG (ref 35–45)
PCO2 BLDA: 41.7 MMHG (ref 35–45)
PCO2 BLDA: 42.7 MMHG (ref 35–45)
PCO2 BLDA: 44.2 MMHG (ref 35–45)
PCO2 BLDA: 44.6 MMHG (ref 35–45)
PCO2 BLDA: 45.5 MMHG (ref 35–45)
PCO2 BLDA: 45.6 MMHG (ref 35–45)
PCO2 BLDA: 46.5 MMHG (ref 35–45)
PCO2 BLDA: 48.9 MMHG (ref 35–45)
PEEP: 5
PEEP: 5
PH SMN: 7.32 [PH] (ref 7.35–7.45)
PH SMN: 7.32 [PH] (ref 7.35–7.45)
PH SMN: 7.33 [PH] (ref 7.35–7.45)
PH SMN: 7.34 [PH] (ref 7.35–7.45)
PH SMN: 7.35 [PH] (ref 7.35–7.45)
PH SMN: 7.36 [PH] (ref 7.35–7.45)
PH SMN: 7.36 [PH] (ref 7.35–7.45)
PH SMN: 7.37 [PH] (ref 7.35–7.45)
PH SMN: 7.39 [PH] (ref 7.35–7.45)
PH SMN: 7.4 [PH] (ref 7.35–7.45)
PHOSPHATE SERPL-MCNC: 3 MG/DL (ref 2.7–4.5)
PHOSPHATE SERPL-MCNC: 3.2 MG/DL (ref 2.7–4.5)
PHOSPHATE SERPL-MCNC: 3.6 MG/DL (ref 2.7–4.5)
PIP: 21
PIP: 25
PLATELET # BLD AUTO: 118 K/UL (ref 150–350)
PLATELET # BLD AUTO: 126 K/UL (ref 150–350)
PLATELET # BLD AUTO: 140 K/UL (ref 150–350)
PLATELET # BLD AUTO: 147 K/UL (ref 150–350)
PLATELET # BLD AUTO: 167 K/UL (ref 150–350)
PLATELET BLD QL SMEAR: ABNORMAL
PMV BLD AUTO: 10.7 FL (ref 9.2–12.9)
PMV BLD AUTO: 10.8 FL (ref 9.2–12.9)
PMV BLD AUTO: 10.9 FL (ref 9.2–12.9)
PO2 BLDA: 198 MMHG (ref 80–100)
PO2 BLDA: 198 MMHG (ref 80–100)
PO2 BLDA: 199 MMHG (ref 80–100)
PO2 BLDA: 200 MMHG (ref 80–100)
PO2 BLDA: 201 MMHG (ref 80–100)
PO2 BLDA: 207 MMHG (ref 80–100)
PO2 BLDA: 40 MMHG (ref 40–60)
PO2 BLDA: 53 MMHG (ref 40–60)
PO2 BLDA: 547 MMHG (ref 80–100)
PO2 BLDA: 592 MMHG (ref 80–100)
PO2 BLDA: 595 MMHG (ref 80–100)
PO2 BLDA: 629 MMHG (ref 80–100)
POC ACTIVATED CLOTTING TIME K: 120 SEC (ref 74–137)
POC BE: -1 MMOL/L
POC BE: -2 MMOL/L
POC BE: -2 MMOL/L
POC BE: -3 MMOL/L
POC BE: -3 MMOL/L
POC BE: -4 MMOL/L
POC BE: 1 MMOL/L
POC BE: 2 MMOL/L
POC IONIZED CALCIUM: 1.07 MMOL/L (ref 1.06–1.42)
POC IONIZED CALCIUM: 1.14 MMOL/L (ref 1.06–1.42)
POC IONIZED CALCIUM: 1.15 MMOL/L (ref 1.06–1.42)
POC IONIZED CALCIUM: 1.15 MMOL/L (ref 1.06–1.42)
POC IONIZED CALCIUM: 1.22 MMOL/L (ref 1.06–1.42)
POC IONIZED CALCIUM: 1.24 MMOL/L (ref 1.06–1.42)
POC IONIZED CALCIUM: 1.27 MMOL/L (ref 1.06–1.42)
POC IONIZED CALCIUM: 1.27 MMOL/L (ref 1.06–1.42)
POC IONIZED CALCIUM: 1.28 MMOL/L (ref 1.06–1.42)
POC SATURATED O2: 100 % (ref 95–100)
POC SATURATED O2: 71 % (ref 95–100)
POC SATURATED O2: 85 % (ref 95–100)
POC TCO2: 23 MMOL/L (ref 23–27)
POC TCO2: 24 MMOL/L (ref 24–29)
POC TCO2: 25 MMOL/L (ref 23–27)
POC TCO2: 25 MMOL/L (ref 24–29)
POC TCO2: 26 MMOL/L (ref 23–27)
POC TCO2: 27 MMOL/L (ref 23–27)
POC TCO2: 28 MMOL/L (ref 23–27)
POC TCO2: 29 MMOL/L (ref 23–27)
POCT GLUCOSE: 107 MG/DL (ref 70–110)
POCT GLUCOSE: 115 MG/DL (ref 70–110)
POCT GLUCOSE: 130 MG/DL (ref 70–110)
POCT GLUCOSE: 131 MG/DL (ref 70–110)
POCT GLUCOSE: 135 MG/DL (ref 70–110)
POCT GLUCOSE: 144 MG/DL (ref 70–110)
POCT GLUCOSE: 153 MG/DL (ref 70–110)
POCT GLUCOSE: 162 MG/DL (ref 70–110)
POCT GLUCOSE: 164 MG/DL (ref 70–110)
POCT GLUCOSE: 168 MG/DL (ref 70–110)
POCT GLUCOSE: 169 MG/DL (ref 70–110)
POCT GLUCOSE: 177 MG/DL (ref 70–110)
POTASSIUM BLD-SCNC: 3.3 MMOL/L (ref 3.5–5.1)
POTASSIUM BLD-SCNC: 3.4 MMOL/L (ref 3.5–5.1)
POTASSIUM BLD-SCNC: 3.4 MMOL/L (ref 3.5–5.1)
POTASSIUM BLD-SCNC: 3.7 MMOL/L (ref 3.5–5.1)
POTASSIUM BLD-SCNC: 3.8 MMOL/L (ref 3.5–5.1)
POTASSIUM BLD-SCNC: 3.8 MMOL/L (ref 3.5–5.1)
POTASSIUM BLD-SCNC: 4.7 MMOL/L (ref 3.5–5.1)
POTASSIUM BLD-SCNC: 4.8 MMOL/L (ref 3.5–5.1)
POTASSIUM BLD-SCNC: 5 MMOL/L (ref 3.5–5.1)
POTASSIUM SERPL-SCNC: 3.4 MMOL/L (ref 3.5–5.1)
POTASSIUM SERPL-SCNC: 4.7 MMOL/L (ref 3.5–5.1)
POTASSIUM SERPL-SCNC: 4.7 MMOL/L (ref 3.5–5.1)
POTASSIUM SERPL-SCNC: 5 MMOL/L (ref 3.5–5.1)
PROT SERPL-MCNC: 7.9 G/DL (ref 6–8.4)
PROTHROMBIN TIME: 11.8 SEC (ref 9–12.5)
PROTHROMBIN TIME: 11.9 SEC (ref 9–12.5)
PROTHROMBIN TIME: 13 SEC (ref 9–12.5)
PROTHROMBIN TIME: 18 SEC (ref 9–12.5)
RBC # BLD AUTO: 2.41 M/UL (ref 4.6–6.2)
RBC # BLD AUTO: 2.62 M/UL (ref 4.6–6.2)
RBC # BLD AUTO: 2.74 M/UL (ref 4.6–6.2)
RBC # BLD AUTO: 2.78 M/UL (ref 4.6–6.2)
RBC # BLD AUTO: 3.73 M/UL (ref 4.6–6.2)
SAMPLE: ABNORMAL
SAMPLE: NORMAL
SITE: ABNORMAL
SODIUM BLD-SCNC: 139 MMOL/L (ref 136–145)
SODIUM BLD-SCNC: 140 MMOL/L (ref 136–145)
SODIUM BLD-SCNC: 140 MMOL/L (ref 136–145)
SODIUM BLD-SCNC: 141 MMOL/L (ref 136–145)
SODIUM BLD-SCNC: 142 MMOL/L (ref 136–145)
SODIUM SERPL-SCNC: 138 MMOL/L (ref 136–145)
SODIUM SERPL-SCNC: 138 MMOL/L (ref 136–145)
SODIUM SERPL-SCNC: 139 MMOL/L (ref 136–145)
SODIUM SERPL-SCNC: 139 MMOL/L (ref 136–145)
SP02: 100
SP02: 100
TRANS PLATPHERESIS VOL PATIENT: NORMAL ML
VT: 480
VT: 480
WBC # BLD AUTO: 10.74 K/UL (ref 3.9–12.7)
WBC # BLD AUTO: 11.21 K/UL (ref 3.9–12.7)
WBC # BLD AUTO: 13.27 K/UL (ref 3.9–12.7)
WBC # BLD AUTO: 19.34 K/UL (ref 3.9–12.7)
WBC # BLD AUTO: 7.65 K/UL (ref 3.9–12.7)

## 2021-01-13 PROCEDURE — 85610 PROTHROMBIN TIME: CPT | Mod: 91

## 2021-01-13 PROCEDURE — 94761 N-INVAS EAR/PLS OXIMETRY MLT: CPT

## 2021-01-13 PROCEDURE — P9017 PLASMA 1 DONOR FRZ W/IN 8 HR: HCPCS

## 2021-01-13 PROCEDURE — 85610 PROTHROMBIN TIME: CPT

## 2021-01-13 PROCEDURE — 84295 ASSAY OF SERUM SODIUM: CPT

## 2021-01-13 PROCEDURE — 27100026 HC SHUNT SENSOR, TERUMO

## 2021-01-13 PROCEDURE — 93312 ECHO TRANSESOPHAGEAL: CPT | Mod: 26,59,, | Performed by: ANESTHESIOLOGY

## 2021-01-13 PROCEDURE — P9016 RBC LEUKOCYTES REDUCED: HCPCS

## 2021-01-13 PROCEDURE — 76937 CENTRAL LINE: ICD-10-PCS | Mod: 26,,, | Performed by: ANESTHESIOLOGY

## 2021-01-13 PROCEDURE — 85014 HEMATOCRIT: CPT

## 2021-01-13 PROCEDURE — 85025 COMPLETE CBC W/AUTO DIFF WBC: CPT

## 2021-01-13 PROCEDURE — 82803 BLOOD GASES ANY COMBINATION: CPT

## 2021-01-13 PROCEDURE — 37000008 HC ANESTHESIA 1ST 15 MINUTES: Performed by: THORACIC SURGERY (CARDIOTHORACIC VASCULAR SURGERY)

## 2021-01-13 PROCEDURE — 27000191 HC C-V MONITORING

## 2021-01-13 PROCEDURE — 63600175 PHARM REV CODE 636 W HCPCS: Performed by: STUDENT IN AN ORGANIZED HEALTH CARE EDUCATION/TRAINING PROGRAM

## 2021-01-13 PROCEDURE — 83735 ASSAY OF MAGNESIUM: CPT | Mod: 91

## 2021-01-13 PROCEDURE — 83605 ASSAY OF LACTIC ACID: CPT

## 2021-01-13 PROCEDURE — 27801475 HC HEARTMATE III IMPLANT KIT

## 2021-01-13 PROCEDURE — 94003 VENT MGMT INPAT SUBQ DAY: CPT

## 2021-01-13 PROCEDURE — C1768 GRAFT, VASCULAR: HCPCS | Performed by: THORACIC SURGERY (CARDIOTHORACIC VASCULAR SURGERY)

## 2021-01-13 PROCEDURE — 27202608 HC CANNULA, MISC

## 2021-01-13 PROCEDURE — 80048 BASIC METABOLIC PNL TOTAL CA: CPT

## 2021-01-13 PROCEDURE — 36415 COLL VENOUS BLD VENIPUNCTURE: CPT

## 2021-01-13 PROCEDURE — 93312 PR ECHO HEART,TRANSESOPHAGEAL: ICD-10-PCS | Mod: 26,59,, | Performed by: ANESTHESIOLOGY

## 2021-01-13 PROCEDURE — P9035 PLATELET PHERES LEUKOREDUCED: HCPCS

## 2021-01-13 PROCEDURE — 88305 TISSUE EXAM BY PATHOLOGIST: CPT | Mod: 26,,, | Performed by: PATHOLOGY

## 2021-01-13 PROCEDURE — 63600367 HC NITRIC OXIDE PER HOUR

## 2021-01-13 PROCEDURE — 84100 ASSAY OF PHOSPHORUS: CPT

## 2021-01-13 PROCEDURE — 37799 UNLISTED PX VASCULAR SURGERY: CPT

## 2021-01-13 PROCEDURE — 63600175 PHARM REV CODE 636 W HCPCS: Performed by: PHYSICIAN ASSISTANT

## 2021-01-13 PROCEDURE — 25000003 PHARM REV CODE 250: Performed by: STUDENT IN AN ORGANIZED HEALTH CARE EDUCATION/TRAINING PROGRAM

## 2021-01-13 PROCEDURE — 25000003 PHARM REV CODE 250: Performed by: THORACIC SURGERY (CARDIOTHORACIC VASCULAR SURGERY)

## 2021-01-13 PROCEDURE — 80076 HEPATIC FUNCTION PANEL: CPT

## 2021-01-13 PROCEDURE — 33979 INSERT INTRACORPOREAL DEVICE: CPT | Mod: 82,,, | Performed by: SURGERY

## 2021-01-13 PROCEDURE — 36000713 HC OR TIME LEV V EA ADD 15 MIN: Performed by: THORACIC SURGERY (CARDIOTHORACIC VASCULAR SURGERY)

## 2021-01-13 PROCEDURE — C1729 CATH, DRAINAGE: HCPCS | Performed by: THORACIC SURGERY (CARDIOTHORACIC VASCULAR SURGERY)

## 2021-01-13 PROCEDURE — 63600175 PHARM REV CODE 636 W HCPCS: Mod: JG

## 2021-01-13 PROCEDURE — 85384 FIBRINOGEN ACTIVITY: CPT

## 2021-01-13 PROCEDURE — 82565 ASSAY OF CREATININE: CPT

## 2021-01-13 PROCEDURE — P9045 ALBUMIN (HUMAN), 5%, 250 ML: HCPCS | Mod: JG

## 2021-01-13 PROCEDURE — 36000712 HC OR TIME LEV V 1ST 15 MIN: Performed by: THORACIC SURGERY (CARDIOTHORACIC VASCULAR SURGERY)

## 2021-01-13 PROCEDURE — 27201037 HC PRESSURE MONITORING SET UP

## 2021-01-13 PROCEDURE — 33979 PR INSERT VENT ASST DEV,IMPLANT,SINGLE VENT: ICD-10-PCS | Mod: ,,, | Performed by: THORACIC SURGERY (CARDIOTHORACIC VASCULAR SURGERY)

## 2021-01-13 PROCEDURE — 99900035 HC TECH TIME PER 15 MIN (STAT)

## 2021-01-13 PROCEDURE — 99291 PR CRITICAL CARE, E/M 30-74 MINUTES: ICD-10-PCS | Mod: GC,,, | Performed by: SURGERY

## 2021-01-13 PROCEDURE — 82330 ASSAY OF CALCIUM: CPT

## 2021-01-13 PROCEDURE — 36556 INSERT NON-TUNNEL CV CATH: CPT | Mod: 59,,, | Performed by: ANESTHESIOLOGY

## 2021-01-13 PROCEDURE — 82962 GLUCOSE BLOOD TEST: CPT | Performed by: THORACIC SURGERY (CARDIOTHORACIC VASCULAR SURGERY)

## 2021-01-13 PROCEDURE — 88305 TISSUE EXAM BY PATHOLOGIST: CPT | Performed by: PATHOLOGY

## 2021-01-13 PROCEDURE — 27000175 HC ADULT BYPASS PUMP

## 2021-01-13 PROCEDURE — 27201041 HC RESERVOIR, CARDIOTOMY

## 2021-01-13 PROCEDURE — D9220A PRA ANESTHESIA: ICD-10-PCS | Mod: ,,, | Performed by: ANESTHESIOLOGY

## 2021-01-13 PROCEDURE — 33979 INSERT INTRACORPOREAL DEVICE: CPT | Mod: ,,, | Performed by: THORACIC SURGERY (CARDIOTHORACIC VASCULAR SURGERY)

## 2021-01-13 PROCEDURE — 33979 PR INSERT VENT ASST DEV,IMPLANT,SINGLE VENT: ICD-10-PCS | Mod: 82,,, | Performed by: SURGERY

## 2021-01-13 PROCEDURE — 84132 ASSAY OF SERUM POTASSIUM: CPT

## 2021-01-13 PROCEDURE — 37000009 HC ANESTHESIA EA ADD 15 MINS: Performed by: THORACIC SURGERY (CARDIOTHORACIC VASCULAR SURGERY)

## 2021-01-13 PROCEDURE — 99223 PR INITIAL HOSPITAL CARE,LEVL III: ICD-10-PCS | Mod: ,,, | Performed by: NURSE PRACTITIONER

## 2021-01-13 PROCEDURE — P9045 ALBUMIN (HUMAN), 5%, 250 ML: HCPCS | Mod: JG | Performed by: THORACIC SURGERY (CARDIOTHORACIC VASCULAR SURGERY)

## 2021-01-13 PROCEDURE — 76937 US GUIDE VASCULAR ACCESS: CPT | Mod: 26,,, | Performed by: ANESTHESIOLOGY

## 2021-01-13 PROCEDURE — 25000003 PHARM REV CODE 250: Performed by: PHYSICIAN ASSISTANT

## 2021-01-13 PROCEDURE — 27100088 HC CELL SAVER

## 2021-01-13 PROCEDURE — 99223 1ST HOSP IP/OBS HIGH 75: CPT | Mod: ,,, | Performed by: NURSE PRACTITIONER

## 2021-01-13 PROCEDURE — 85730 THROMBOPLASTIN TIME PARTIAL: CPT | Mod: 91

## 2021-01-13 PROCEDURE — 36620 INSERTION CATHETER ARTERY: CPT | Mod: 59,,, | Performed by: ANESTHESIOLOGY

## 2021-01-13 PROCEDURE — 27000221 HC OXYGEN, UP TO 24 HOURS

## 2021-01-13 PROCEDURE — 88305 TISSUE EXAM BY PATHOLOGIST: ICD-10-PCS | Mod: 26,,, | Performed by: PATHOLOGY

## 2021-01-13 PROCEDURE — 36430 TRANSFUSION BLD/BLD COMPNT: CPT

## 2021-01-13 PROCEDURE — 63600175 PHARM REV CODE 636 W HCPCS: Mod: JG | Performed by: THORACIC SURGERY (CARDIOTHORACIC VASCULAR SURGERY)

## 2021-01-13 PROCEDURE — 85520 HEPARIN ASSAY: CPT

## 2021-01-13 PROCEDURE — 36592 COLLECT BLOOD FROM PICC: CPT

## 2021-01-13 PROCEDURE — 27201423 OPTIME MED/SURG SUP & DEVICES STERILE SUPPLY: Performed by: THORACIC SURGERY (CARDIOTHORACIC VASCULAR SURGERY)

## 2021-01-13 PROCEDURE — 99291 CRITICAL CARE FIRST HOUR: CPT | Mod: GC,,, | Performed by: SURGERY

## 2021-01-13 PROCEDURE — 80048 BASIC METABOLIC PNL TOTAL CA: CPT | Mod: 91

## 2021-01-13 PROCEDURE — 27201015 HC HEMO-CONCENTRATOR

## 2021-01-13 PROCEDURE — 36556 CENTRAL LINE: ICD-10-PCS | Mod: 59,,, | Performed by: ANESTHESIOLOGY

## 2021-01-13 PROCEDURE — 20000000 HC ICU ROOM

## 2021-01-13 PROCEDURE — 83615 LACTATE (LD) (LDH) ENZYME: CPT

## 2021-01-13 PROCEDURE — D9220A PRA ANESTHESIA: Mod: ,,, | Performed by: ANESTHESIOLOGY

## 2021-01-13 PROCEDURE — 99233 PR SUBSEQUENT HOSPITAL CARE,LEVL III: ICD-10-PCS | Mod: ,,, | Performed by: INTERNAL MEDICINE

## 2021-01-13 PROCEDURE — 99233 SBSQ HOSP IP/OBS HIGH 50: CPT | Mod: ,,, | Performed by: INTERNAL MEDICINE

## 2021-01-13 PROCEDURE — 36620 ARTERIAL: ICD-10-PCS | Mod: 59,,, | Performed by: ANESTHESIOLOGY

## 2021-01-13 DEVICE — FELT TEFLON 1INX6IN: Type: IMPLANTABLE DEVICE | Site: HEART | Status: FUNCTIONAL

## 2021-01-13 RX ORDER — PROPOFOL 10 MG/ML
VIAL (ML) INTRAVENOUS CONTINUOUS PRN
Status: DISCONTINUED | OUTPATIENT
Start: 2021-01-13 | End: 2021-01-13

## 2021-01-13 RX ORDER — ALBUMIN HUMAN 50 G/1000ML
25 SOLUTION INTRAVENOUS ONCE
Status: COMPLETED | OUTPATIENT
Start: 2021-01-13 | End: 2021-01-13

## 2021-01-13 RX ORDER — CIPROFLOXACIN 2 MG/ML
400 INJECTION, SOLUTION INTRAVENOUS
Status: COMPLETED | OUTPATIENT
Start: 2021-01-13 | End: 2021-01-13

## 2021-01-13 RX ORDER — MAGNESIUM SULFATE HEPTAHYDRATE 40 MG/ML
2 INJECTION, SOLUTION INTRAVENOUS
Status: CANCELLED | OUTPATIENT
Start: 2021-01-13

## 2021-01-13 RX ORDER — MUPIROCIN 20 MG/G
OINTMENT TOPICAL
Status: DISCONTINUED | OUTPATIENT
Start: 2021-01-13 | End: 2021-01-13 | Stop reason: HOSPADM

## 2021-01-13 RX ORDER — LIDOCAINE HYDROCHLORIDE 20 MG/ML
INJECTION, SOLUTION EPIDURAL; INFILTRATION; INTRACAUDAL; PERINEURAL
Status: DISCONTINUED | OUTPATIENT
Start: 2021-01-13 | End: 2021-01-13

## 2021-01-13 RX ORDER — PROPOFOL 10 MG/ML
0-50 INJECTION, EMULSION INTRAVENOUS CONTINUOUS
Status: DISCONTINUED | OUTPATIENT
Start: 2021-01-13 | End: 2021-01-15

## 2021-01-13 RX ORDER — ALBUTEROL SULFATE 2.5 MG/.5ML
2.5 SOLUTION RESPIRATORY (INHALATION) EVERY 4 HOURS PRN
Status: DISCONTINUED | OUTPATIENT
Start: 2021-01-13 | End: 2021-01-25

## 2021-01-13 RX ORDER — ASPIRIN 325 MG
325 TABLET ORAL DAILY
Status: DISCONTINUED | OUTPATIENT
Start: 2021-01-13 | End: 2021-01-21

## 2021-01-13 RX ORDER — SODIUM CHLORIDE 9 MG/ML
INJECTION, SOLUTION INTRAVENOUS CONTINUOUS
Status: CANCELLED | OUTPATIENT
Start: 2021-01-13

## 2021-01-13 RX ORDER — POTASSIUM CHLORIDE 14.9 MG/ML
40 INJECTION INTRAVENOUS
Status: DISCONTINUED | OUTPATIENT
Start: 2021-01-13 | End: 2021-02-01

## 2021-01-13 RX ORDER — SODIUM CHLORIDE 9 MG/ML
INJECTION, SOLUTION INTRAVENOUS CONTINUOUS
Status: DISCONTINUED | OUTPATIENT
Start: 2021-01-13 | End: 2021-02-01

## 2021-01-13 RX ORDER — CIPROFLOXACIN 2 MG/ML
400 INJECTION, SOLUTION INTRAVENOUS
Status: CANCELLED | OUTPATIENT
Start: 2021-01-13

## 2021-01-13 RX ORDER — TRANEXAMIC ACID 100 MG/ML
INJECTION, SOLUTION INTRAVENOUS
Status: DISCONTINUED | OUTPATIENT
Start: 2021-01-13 | End: 2021-01-13

## 2021-01-13 RX ORDER — SODIUM CHLORIDE 0.9 % (FLUSH) 0.9 %
10 SYRINGE (ML) INJECTION
Status: DISCONTINUED | OUTPATIENT
Start: 2021-01-13 | End: 2021-01-13

## 2021-01-13 RX ORDER — HYDROCODONE BITARTRATE AND ACETAMINOPHEN 500; 5 MG/1; MG/1
TABLET ORAL
Status: DISCONTINUED | OUTPATIENT
Start: 2021-01-13 | End: 2021-01-13

## 2021-01-13 RX ORDER — ASPIRIN 325 MG
325 TABLET, DELAYED RELEASE (ENTERIC COATED) ORAL DAILY
Status: DISCONTINUED | OUTPATIENT
Start: 2021-01-13 | End: 2021-01-13

## 2021-01-13 RX ORDER — ADHESIVE BANDAGE
30 BANDAGE TOPICAL DAILY PRN
Status: DISCONTINUED | OUTPATIENT
Start: 2021-01-13 | End: 2021-02-01

## 2021-01-13 RX ORDER — OXYCODONE HYDROCHLORIDE 10 MG/1
10 TABLET ORAL EVERY 4 HOURS PRN
Status: CANCELLED | OUTPATIENT
Start: 2021-01-13

## 2021-01-13 RX ORDER — DOCUSATE SODIUM 100 MG/1
200 CAPSULE, LIQUID FILLED ORAL NIGHTLY
Status: DISCONTINUED | OUTPATIENT
Start: 2021-01-13 | End: 2021-02-04 | Stop reason: HOSPADM

## 2021-01-13 RX ORDER — RIFAMPIN 600 MG/10ML
INJECTION, POWDER, LYOPHILIZED, FOR SOLUTION INTRAVENOUS
Status: DISCONTINUED | OUTPATIENT
Start: 2021-01-13 | End: 2021-01-13 | Stop reason: HOSPADM

## 2021-01-13 RX ORDER — ALBUMIN HUMAN 50 G/1000ML
25 SOLUTION INTRAVENOUS
Status: CANCELLED | OUTPATIENT
Start: 2021-01-13

## 2021-01-13 RX ORDER — MUPIROCIN 20 MG/G
OINTMENT TOPICAL 2 TIMES DAILY
Status: CANCELLED | OUTPATIENT
Start: 2021-01-13 | End: 2021-01-18

## 2021-01-13 RX ORDER — FERROUS GLUCONATE 324(38)MG
324 TABLET ORAL
Status: DISCONTINUED | OUTPATIENT
Start: 2021-01-14 | End: 2021-01-13

## 2021-01-13 RX ORDER — PROTAMINE SULFATE 10 MG/ML
INJECTION, SOLUTION INTRAVENOUS
Status: DISCONTINUED | OUTPATIENT
Start: 2021-01-13 | End: 2021-01-13

## 2021-01-13 RX ORDER — KETAMINE HCL IN 0.9 % NACL 50 MG/5 ML
SYRINGE (ML) INTRAVENOUS
Status: DISCONTINUED | OUTPATIENT
Start: 2021-01-13 | End: 2021-01-13

## 2021-01-13 RX ORDER — SODIUM CHLORIDE 0.9 % (FLUSH) 0.9 %
10 SYRINGE (ML) INJECTION
Status: CANCELLED | OUTPATIENT
Start: 2021-01-13

## 2021-01-13 RX ORDER — OXYCODONE HYDROCHLORIDE 5 MG/1
5 TABLET ORAL EVERY 4 HOURS PRN
Status: CANCELLED | OUTPATIENT
Start: 2021-01-13

## 2021-01-13 RX ORDER — MIDAZOLAM HYDROCHLORIDE 1 MG/ML
INJECTION INTRAMUSCULAR; INTRAVENOUS
Status: DISCONTINUED | OUTPATIENT
Start: 2021-01-13 | End: 2021-01-13

## 2021-01-13 RX ORDER — NICARDIPINE HYDROCHLORIDE 0.2 MG/ML
0-15 INJECTION INTRAVENOUS CONTINUOUS
Status: DISCONTINUED | OUTPATIENT
Start: 2021-01-13 | End: 2021-01-20

## 2021-01-13 RX ORDER — ADHESIVE BANDAGE
30 BANDAGE TOPICAL DAILY PRN
Status: CANCELLED | OUTPATIENT
Start: 2021-01-13

## 2021-01-13 RX ORDER — POTASSIUM CHLORIDE 14.9 MG/ML
40 INJECTION INTRAVENOUS
Status: CANCELLED | OUTPATIENT
Start: 2021-01-13

## 2021-01-13 RX ORDER — FENTANYL CITRATE 50 UG/ML
50 INJECTION, SOLUTION INTRAMUSCULAR; INTRAVENOUS
Status: DISCONTINUED | OUTPATIENT
Start: 2021-01-13 | End: 2021-02-01

## 2021-01-13 RX ORDER — ASPIRIN 325 MG
325 TABLET, DELAYED RELEASE (ENTERIC COATED) ORAL DAILY
Status: CANCELLED | OUTPATIENT
Start: 2021-01-13

## 2021-01-13 RX ORDER — FENTANYL CITRATE 50 UG/ML
INJECTION, SOLUTION INTRAMUSCULAR; INTRAVENOUS
Status: DISCONTINUED | OUTPATIENT
Start: 2021-01-13 | End: 2021-01-13

## 2021-01-13 RX ORDER — ALBUTEROL SULFATE 2.5 MG/.5ML
2.5 SOLUTION RESPIRATORY (INHALATION) EVERY 4 HOURS PRN
Status: CANCELLED | OUTPATIENT
Start: 2021-01-13

## 2021-01-13 RX ORDER — MUPIROCIN 20 MG/G
1 OINTMENT TOPICAL 2 TIMES DAILY
Status: DISCONTINUED | OUTPATIENT
Start: 2021-01-13 | End: 2021-01-13 | Stop reason: HOSPADM

## 2021-01-13 RX ORDER — OXYCODONE HYDROCHLORIDE 5 MG/1
10 TABLET ORAL EVERY 4 HOURS PRN
Status: DISCONTINUED | OUTPATIENT
Start: 2021-01-13 | End: 2021-02-04 | Stop reason: HOSPADM

## 2021-01-13 RX ORDER — MAGNESIUM SULFATE HEPTAHYDRATE 40 MG/ML
2 INJECTION, SOLUTION INTRAVENOUS
Status: DISCONTINUED | OUTPATIENT
Start: 2021-01-13 | End: 2021-02-01

## 2021-01-13 RX ORDER — BISACODYL 10 MG
10 SUPPOSITORY, RECTAL RECTAL DAILY PRN
Status: DISCONTINUED | OUTPATIENT
Start: 2021-01-13 | End: 2021-02-01

## 2021-01-13 RX ORDER — DEXTROSE MONOHYDRATE, SODIUM CHLORIDE, AND POTASSIUM CHLORIDE 50; 2.98; 4.5 G/1000ML; G/1000ML; G/1000ML
INJECTION, SOLUTION INTRAVENOUS CONTINUOUS
Status: DISCONTINUED | OUTPATIENT
Start: 2021-01-13 | End: 2021-02-01

## 2021-01-13 RX ORDER — TRANEXAMIC ACID 100 MG/ML
INJECTION, SOLUTION INTRAVENOUS CONTINUOUS PRN
Status: DISCONTINUED | OUTPATIENT
Start: 2021-01-13 | End: 2021-01-13

## 2021-01-13 RX ORDER — FAMOTIDINE 10 MG/ML
20 INJECTION INTRAVENOUS 2 TIMES DAILY
Status: DISCONTINUED | OUTPATIENT
Start: 2021-01-13 | End: 2021-01-13

## 2021-01-13 RX ORDER — BISACODYL 10 MG
10 SUPPOSITORY, RECTAL RECTAL DAILY PRN
Status: CANCELLED | OUTPATIENT
Start: 2021-01-13

## 2021-01-13 RX ORDER — HEPARIN SODIUM 1000 [USP'U]/ML
INJECTION, SOLUTION INTRAVENOUS; SUBCUTANEOUS
Status: DISCONTINUED | OUTPATIENT
Start: 2021-01-13 | End: 2021-01-13

## 2021-01-13 RX ORDER — HYDROCODONE BITARTRATE AND ACETAMINOPHEN 500; 5 MG/1; MG/1
TABLET ORAL
Status: DISCONTINUED | OUTPATIENT
Start: 2021-01-13 | End: 2021-02-01

## 2021-01-13 RX ORDER — ALBUMIN HUMAN 50 G/1000ML
12.5 SOLUTION INTRAVENOUS ONCE
Status: COMPLETED | OUTPATIENT
Start: 2021-01-13 | End: 2021-01-13

## 2021-01-13 RX ORDER — ALBUMIN HUMAN 50 G/1000ML
SOLUTION INTRAVENOUS
Status: COMPLETED
Start: 2021-01-13 | End: 2021-01-13

## 2021-01-13 RX ORDER — FERROUS GLUCONATE 324(38)MG
324 TABLET ORAL
Status: CANCELLED | OUTPATIENT
Start: 2021-01-14

## 2021-01-13 RX ORDER — ASPIRIN 325 MG
325 TABLET ORAL DAILY
Status: CANCELLED | OUTPATIENT
Start: 2021-01-13

## 2021-01-13 RX ORDER — DEXTROSE MONOHYDRATE, SODIUM CHLORIDE, AND POTASSIUM CHLORIDE 50; 2.98; 4.5 G/1000ML; G/1000ML; G/1000ML
INJECTION, SOLUTION INTRAVENOUS CONTINUOUS
Status: CANCELLED | OUTPATIENT
Start: 2021-01-13

## 2021-01-13 RX ORDER — PANTOPRAZOLE SODIUM 40 MG/10ML
40 INJECTION, POWDER, LYOPHILIZED, FOR SOLUTION INTRAVENOUS DAILY
Status: DISCONTINUED | OUTPATIENT
Start: 2021-01-13 | End: 2021-01-27

## 2021-01-13 RX ORDER — MAGNESIUM SULFATE HEPTAHYDRATE 500 MG/ML
INJECTION, SOLUTION INTRAMUSCULAR; INTRAVENOUS
Status: DISCONTINUED | OUTPATIENT
Start: 2021-01-13 | End: 2021-01-13

## 2021-01-13 RX ORDER — NEOSTIGMINE METHYLSULFATE 0.5 MG/ML
INJECTION, SOLUTION INTRAVENOUS
Status: DISCONTINUED | OUTPATIENT
Start: 2021-01-13 | End: 2021-01-13

## 2021-01-13 RX ORDER — CIPROFLOXACIN 2 MG/ML
400 INJECTION, SOLUTION INTRAVENOUS
Status: DISCONTINUED | OUTPATIENT
Start: 2021-01-13 | End: 2021-01-18

## 2021-01-13 RX ORDER — FUROSEMIDE 10 MG/ML
10 INJECTION INTRAMUSCULAR; INTRAVENOUS ONCE
Status: DISCONTINUED | OUTPATIENT
Start: 2021-01-13 | End: 2021-01-13

## 2021-01-13 RX ORDER — ONDANSETRON 2 MG/ML
INJECTION INTRAMUSCULAR; INTRAVENOUS
Status: DISCONTINUED | OUTPATIENT
Start: 2021-01-13 | End: 2021-01-13

## 2021-01-13 RX ORDER — ALBUTEROL SULFATE 2.5 MG/.5ML
2.5 SOLUTION RESPIRATORY (INHALATION) EVERY 4 HOURS PRN
Status: DISCONTINUED | OUTPATIENT
Start: 2021-01-13 | End: 2021-01-13

## 2021-01-13 RX ORDER — FAMOTIDINE 10 MG/ML
20 INJECTION INTRAVENOUS 2 TIMES DAILY
Status: CANCELLED | OUTPATIENT
Start: 2021-01-13

## 2021-01-13 RX ORDER — ROCURONIUM BROMIDE 10 MG/ML
INJECTION, SOLUTION INTRAVENOUS
Status: DISCONTINUED | OUTPATIENT
Start: 2021-01-13 | End: 2021-01-13

## 2021-01-13 RX ORDER — OXYCODONE HYDROCHLORIDE 5 MG/1
5 TABLET ORAL EVERY 4 HOURS PRN
Status: DISCONTINUED | OUTPATIENT
Start: 2021-01-13 | End: 2021-02-04 | Stop reason: HOSPADM

## 2021-01-13 RX ORDER — MUPIROCIN 20 MG/G
OINTMENT TOPICAL 2 TIMES DAILY
Status: COMPLETED | OUTPATIENT
Start: 2021-01-13 | End: 2021-01-18

## 2021-01-13 RX ORDER — POLYETHYLENE GLYCOL 3350 17 G/17G
17 POWDER, FOR SOLUTION ORAL DAILY
Status: DISCONTINUED | OUTPATIENT
Start: 2021-01-14 | End: 2021-01-16

## 2021-01-13 RX ADMIN — EPINEPHRINE 0.05 MCG/KG/MIN: 1 INJECTION INTRAMUSCULAR; INTRAVENOUS; SUBCUTANEOUS at 04:01

## 2021-01-13 RX ADMIN — PROTAMINE SULFATE 50 MG: 10 INJECTION, SOLUTION INTRAVENOUS at 12:01

## 2021-01-13 RX ADMIN — Medication 50 MG: at 08:01

## 2021-01-13 RX ADMIN — VASOPRESSIN 0.02 UNITS/MIN: 20 INJECTION INTRAVENOUS at 04:01

## 2021-01-13 RX ADMIN — SODIUM CHLORIDE 6 UNITS/HR: 9 INJECTION, SOLUTION INTRAVENOUS at 10:01

## 2021-01-13 RX ADMIN — FENTANYL CITRATE 50 MCG: 50 INJECTION INTRAMUSCULAR; INTRAVENOUS at 05:01

## 2021-01-13 RX ADMIN — CIPROFLOXACIN 400 MG: 2 INJECTION, SOLUTION INTRAVENOUS at 09:01

## 2021-01-13 RX ADMIN — SODIUM CHLORIDE, SODIUM GLUCONATE, SODIUM ACETATE, POTASSIUM CHLORIDE, MAGNESIUM CHLORIDE, SODIUM PHOSPHATE, DIBASIC, AND POTASSIUM PHOSPHATE: .53; .5; .37; .037; .03; .012; .00082 INJECTION, SOLUTION INTRAVENOUS at 08:01

## 2021-01-13 RX ADMIN — METHADONE HYDROCHLORIDE 16.02 MG: 10 INJECTION, SOLUTION INTRAMUSCULAR; INTRAVENOUS; SUBCUTANEOUS at 08:01

## 2021-01-13 RX ADMIN — MAGNESIUM SULFATE 2 G: 2 INJECTION INTRAVENOUS at 01:01

## 2021-01-13 RX ADMIN — SODIUM CHLORIDE: 0.9 INJECTION, SOLUTION INTRAVENOUS at 07:01

## 2021-01-13 RX ADMIN — NOREPINEPHRINE BITARTRATE 0.02 MG: 1 INJECTION, SOLUTION, CONCENTRATE INTRAVENOUS at 08:01

## 2021-01-13 RX ADMIN — MUPIROCIN: 20 OINTMENT TOPICAL at 07:01

## 2021-01-13 RX ADMIN — SODIUM CHLORIDE 1 MG/KG/HR: 9 INJECTION, SOLUTION INTRAVENOUS at 07:01

## 2021-01-13 RX ADMIN — CALCIUM CHLORIDE 1 G: 100 INJECTION, SOLUTION INTRAVENOUS at 11:01

## 2021-01-13 RX ADMIN — LIDOCAINE HYDROCHLORIDE 100 MG: 20 INJECTION, SOLUTION EPIDURAL; INFILTRATION; INTRACAUDAL at 11:01

## 2021-01-13 RX ADMIN — VASOPRESSIN 0.04 UNITS/HR: 20 INJECTION INTRAVENOUS at 10:01

## 2021-01-13 RX ADMIN — MIDAZOLAM 1 MG: 1 INJECTION INTRAMUSCULAR; INTRAVENOUS at 08:01

## 2021-01-13 RX ADMIN — ALBUMIN (HUMAN) 12.5 G: 12.5 INJECTION, SOLUTION INTRAVENOUS at 01:01

## 2021-01-13 RX ADMIN — FENTANYL CITRATE 50 MCG: 50 INJECTION, SOLUTION INTRAMUSCULAR; INTRAVENOUS at 08:01

## 2021-01-13 RX ADMIN — EPINEPHRINE 0.04 MCG/KG/MIN: 1 INJECTION INTRAMUSCULAR; INTRAVENOUS; SUBCUTANEOUS at 08:01

## 2021-01-13 RX ADMIN — ALBUMIN (HUMAN) 25 G: 12.5 INJECTION, SOLUTION INTRAVENOUS at 07:01

## 2021-01-13 RX ADMIN — MUPIROCIN: 20 OINTMENT TOPICAL at 09:01

## 2021-01-13 RX ADMIN — PHYTONADIONE 10 MG: 10 INJECTION, EMULSION INTRAMUSCULAR; INTRAVENOUS; SUBCUTANEOUS at 02:01

## 2021-01-13 RX ADMIN — MAGNESIUM SULFATE HEPTAHYDRATE 2 G: 500 INJECTION, SOLUTION INTRAMUSCULAR; INTRAVENOUS at 11:01

## 2021-01-13 RX ADMIN — CIPROFLOXACIN 400 MG: 2 INJECTION, SOLUTION INTRAVENOUS at 08:01

## 2021-01-13 RX ADMIN — POTASSIUM CHLORIDE 40 MEQ: 14.9 INJECTION, SOLUTION INTRAVENOUS at 01:01

## 2021-01-13 RX ADMIN — PROPOFOL 25 MCG/KG/MIN: 10 INJECTION, EMULSION INTRAVENOUS at 04:01

## 2021-01-13 RX ADMIN — HEPARIN SODIUM 28000 UNITS: 1000 INJECTION, SOLUTION INTRAVENOUS; SUBCUTANEOUS at 10:01

## 2021-01-13 RX ADMIN — ALBUMIN HUMAN 12.5 G: 50 SOLUTION INTRAVENOUS at 01:01

## 2021-01-13 RX ADMIN — LIDOCAINE HYDROCHLORIDE 100 MG: 20 INJECTION, SOLUTION EPIDURAL; INFILTRATION; INTRACAUDAL at 08:01

## 2021-01-13 RX ADMIN — VANCOMYCIN HYDROCHLORIDE 1250 MG: 1.25 INJECTION, POWDER, LYOPHILIZED, FOR SOLUTION INTRAVENOUS at 09:01

## 2021-01-13 RX ADMIN — NEOSTIGMINE METHYLSULFATE 5 MG: 0.5 INJECTION INTRAVENOUS at 12:01

## 2021-01-13 RX ADMIN — FENTANYL CITRATE 50 MCG: 50 INJECTION INTRAMUSCULAR; INTRAVENOUS at 08:01

## 2021-01-13 RX ADMIN — TRANEXAMIC ACID 800 MG: 100 INJECTION, SOLUTION INTRAVENOUS at 09:01

## 2021-01-13 RX ADMIN — SODIUM CHLORIDE, SODIUM GLUCONATE, SODIUM ACETATE, POTASSIUM CHLORIDE, MAGNESIUM CHLORIDE, SODIUM PHOSPHATE, DIBASIC, AND POTASSIUM PHOSPHATE: .53; .5; .37; .037; .03; .012; .00082 INJECTION, SOLUTION INTRAVENOUS at 12:01

## 2021-01-13 RX ADMIN — ASPIRIN 325 MG ORAL TABLET 325 MG: 325 PILL ORAL at 09:01

## 2021-01-13 RX ADMIN — FENTANYL CITRATE 50 MCG: 50 INJECTION INTRAMUSCULAR; INTRAVENOUS at 01:01

## 2021-01-13 RX ADMIN — NOREPINEPHRINE BITARTRATE 0.06 MCG/KG/MIN: 1 INJECTION, SOLUTION, CONCENTRATE INTRAVENOUS at 08:01

## 2021-01-13 RX ADMIN — PROTAMINE SULFATE 250 MG: 10 INJECTION, SOLUTION INTRAVENOUS at 11:01

## 2021-01-13 RX ADMIN — FENTANYL CITRATE 50 MCG: 50 INJECTION INTRAMUSCULAR; INTRAVENOUS at 11:01

## 2021-01-13 RX ADMIN — ROCURONIUM BROMIDE 100 MG: 10 INJECTION, SOLUTION INTRAVENOUS at 08:01

## 2021-01-13 RX ADMIN — FENTANYL CITRATE 100 MCG: 50 INJECTION, SOLUTION INTRAMUSCULAR; INTRAVENOUS at 11:01

## 2021-01-13 RX ADMIN — PROPOFOL 50 MCG/KG/MIN: 10 INJECTION, EMULSION INTRAVENOUS at 11:01

## 2021-01-13 RX ADMIN — FENTANYL CITRATE 100 MCG: 50 INJECTION, SOLUTION INTRAMUSCULAR; INTRAVENOUS at 08:01

## 2021-01-13 RX ADMIN — ROCURONIUM BROMIDE 50 MG: 10 INJECTION, SOLUTION INTRAVENOUS at 09:01

## 2021-01-14 ENCOUNTER — DOCUMENTATION ONLY (OUTPATIENT)
Dept: CARDIOLOGY | Facility: HOSPITAL | Age: 66
End: 2021-01-14

## 2021-01-14 ENCOUNTER — ANESTHESIA (OUTPATIENT)
Dept: SURGERY | Facility: HOSPITAL | Age: 66
DRG: 001 | End: 2021-01-14
Payer: MEDICARE

## 2021-01-14 LAB
ALBUMIN SERPL BCP-MCNC: 3.3 G/DL (ref 3.5–5.2)
ALLENS TEST: ABNORMAL
ALLENS TEST: NORMAL
ALP SERPL-CCNC: 37 U/L (ref 55–135)
ALT SERPL W/O P-5'-P-CCNC: 12 U/L (ref 10–44)
ANION GAP SERPL CALC-SCNC: 11 MMOL/L (ref 8–16)
ANION GAP SERPL CALC-SCNC: 11 MMOL/L (ref 8–16)
ANION GAP SERPL CALC-SCNC: 6 MMOL/L (ref 8–16)
ANION GAP SERPL CALC-SCNC: 6 MMOL/L (ref 8–16)
ANION GAP SERPL CALC-SCNC: 7 MMOL/L (ref 8–16)
ANION GAP SERPL CALC-SCNC: 8 MMOL/L (ref 8–16)
APTT BLDCRRT: 24.5 SEC (ref 21–32)
APTT BLDCRRT: 25 SEC (ref 21–32)
APTT BLDCRRT: 26.5 SEC (ref 21–32)
APTT BLDCRRT: 27.3 SEC (ref 21–32)
APTT BLDCRRT: 29 SEC (ref 21–32)
APTT BLDCRRT: 29.5 SEC (ref 21–32)
AST SERPL-CCNC: 83 U/L (ref 10–40)
BACTERIA BLD CULT: NORMAL
BACTERIA BLD CULT: NORMAL
BASOPHILS # BLD AUTO: 0.01 K/UL (ref 0–0.2)
BASOPHILS # BLD AUTO: 0.02 K/UL (ref 0–0.2)
BASOPHILS # BLD AUTO: 0.03 K/UL (ref 0–0.2)
BASOPHILS # BLD AUTO: 0.03 K/UL (ref 0–0.2)
BASOPHILS NFR BLD: 0.1 % (ref 0–1.9)
BASOPHILS NFR BLD: 0.2 % (ref 0–1.9)
BASOPHILS NFR BLD: 0.3 % (ref 0–1.9)
BASOPHILS NFR BLD: 0.3 % (ref 0–1.9)
BILIRUB SERPL-MCNC: 1.6 MG/DL (ref 0.1–1)
BUN SERPL-MCNC: 28 MG/DL (ref 8–23)
BUN SERPL-MCNC: 29 MG/DL (ref 8–23)
BUN SERPL-MCNC: 31 MG/DL (ref 8–23)
BUN SERPL-MCNC: 32 MG/DL (ref 8–23)
BUN SERPL-MCNC: 33 MG/DL (ref 8–23)
BUN SERPL-MCNC: 33 MG/DL (ref 8–23)
CALCIUM SERPL-MCNC: 8.5 MG/DL (ref 8.7–10.5)
CALCIUM SERPL-MCNC: 8.5 MG/DL (ref 8.7–10.5)
CALCIUM SERPL-MCNC: 8.6 MG/DL (ref 8.7–10.5)
CALCIUM SERPL-MCNC: 8.7 MG/DL (ref 8.7–10.5)
CHLORIDE SERPL-SCNC: 105 MMOL/L (ref 95–110)
CHLORIDE SERPL-SCNC: 105 MMOL/L (ref 95–110)
CHLORIDE SERPL-SCNC: 106 MMOL/L (ref 95–110)
CHLORIDE SERPL-SCNC: 107 MMOL/L (ref 95–110)
CO2 SERPL-SCNC: 21 MMOL/L (ref 23–29)
CO2 SERPL-SCNC: 22 MMOL/L (ref 23–29)
CO2 SERPL-SCNC: 22 MMOL/L (ref 23–29)
CO2 SERPL-SCNC: 23 MMOL/L (ref 23–29)
CO2 SERPL-SCNC: 24 MMOL/L (ref 23–29)
CO2 SERPL-SCNC: 25 MMOL/L (ref 23–29)
CREAT SERPL-MCNC: 1.1 MG/DL (ref 0.5–1.4)
CREAT SERPL-MCNC: 1.1 MG/DL (ref 0.5–1.4)
CREAT SERPL-MCNC: 1.2 MG/DL (ref 0.5–1.4)
CREAT SERPL-MCNC: 1.3 MG/DL (ref 0.5–1.4)
DELSYS: ABNORMAL
DIFFERENTIAL METHOD: ABNORMAL
EOSINOPHIL # BLD AUTO: 0 K/UL (ref 0–0.5)
EOSINOPHIL NFR BLD: 0 % (ref 0–8)
EOSINOPHIL NFR BLD: 0.1 % (ref 0–8)
EOSINOPHIL NFR BLD: 0.2 % (ref 0–8)
EOSINOPHIL NFR BLD: 0.3 % (ref 0–8)
ERYTHROCYTE [DISTWIDTH] IN BLOOD BY AUTOMATED COUNT: 13.6 % (ref 11.5–14.5)
ERYTHROCYTE [DISTWIDTH] IN BLOOD BY AUTOMATED COUNT: 13.7 % (ref 11.5–14.5)
ERYTHROCYTE [DISTWIDTH] IN BLOOD BY AUTOMATED COUNT: 13.7 % (ref 11.5–14.5)
ERYTHROCYTE [DISTWIDTH] IN BLOOD BY AUTOMATED COUNT: 13.8 % (ref 11.5–14.5)
ERYTHROCYTE [DISTWIDTH] IN BLOOD BY AUTOMATED COUNT: 13.8 % (ref 11.5–14.5)
ERYTHROCYTE [DISTWIDTH] IN BLOOD BY AUTOMATED COUNT: 13.9 % (ref 11.5–14.5)
ERYTHROCYTE [DISTWIDTH] IN BLOOD BY AUTOMATED COUNT: 14 % (ref 11.5–14.5)
ERYTHROCYTE [SEDIMENTATION RATE] IN BLOOD BY WESTERGREN METHOD: 16 MM/H
EST. GFR  (AFRICAN AMERICAN): >60 ML/MIN/1.73 M^2
EST. GFR  (NON AFRICAN AMERICAN): 57.3 ML/MIN/1.73 M^2
EST. GFR  (NON AFRICAN AMERICAN): >60 ML/MIN/1.73 M^2
FIBRINOGEN PPP-MCNC: 395 MG/DL (ref 182–366)
FIO2: 40
GLUCOSE SERPL-MCNC: 152 MG/DL (ref 70–110)
GLUCOSE SERPL-MCNC: 162 MG/DL (ref 70–110)
GLUCOSE SERPL-MCNC: 166 MG/DL (ref 70–110)
GLUCOSE SERPL-MCNC: 185 MG/DL (ref 70–110)
GLUCOSE SERPL-MCNC: 206 MG/DL (ref 70–110)
GLUCOSE SERPL-MCNC: 210 MG/DL (ref 70–110)
HCO3 UR-SCNC: 24.1 MMOL/L (ref 24–28)
HCO3 UR-SCNC: 24.6 MMOL/L (ref 24–28)
HCO3 UR-SCNC: 24.8 MMOL/L (ref 24–28)
HCO3 UR-SCNC: 25.4 MMOL/L (ref 24–28)
HCO3 UR-SCNC: 25.6 MMOL/L (ref 24–28)
HCO3 UR-SCNC: 26.5 MMOL/L (ref 24–28)
HCO3 UR-SCNC: 27.2 MMOL/L (ref 24–28)
HCT VFR BLD AUTO: 25.2 % (ref 40–54)
HCT VFR BLD AUTO: 25.8 % (ref 40–54)
HCT VFR BLD AUTO: 25.9 % (ref 40–54)
HCT VFR BLD AUTO: 26.2 % (ref 40–54)
HCT VFR BLD AUTO: 26.7 % (ref 40–54)
HCT VFR BLD AUTO: 26.7 % (ref 40–54)
HCT VFR BLD AUTO: 27.3 % (ref 40–54)
HCT VFR BLD CALC: 24 %PCV (ref 36–54)
HCT VFR BLD CALC: 24 %PCV (ref 36–54)
HCT VFR BLD CALC: 25 %PCV (ref 36–54)
HCT VFR BLD CALC: 27 %PCV (ref 36–54)
HGB BLD-MCNC: 8.1 G/DL (ref 14–18)
HGB BLD-MCNC: 8.3 G/DL (ref 14–18)
HGB BLD-MCNC: 8.3 G/DL (ref 14–18)
HGB BLD-MCNC: 8.4 G/DL (ref 14–18)
HGB BLD-MCNC: 8.5 G/DL (ref 14–18)
IMM GRANULOCYTES # BLD AUTO: 0.04 K/UL (ref 0–0.04)
IMM GRANULOCYTES # BLD AUTO: 0.05 K/UL (ref 0–0.04)
IMM GRANULOCYTES NFR BLD AUTO: 0.3 % (ref 0–0.5)
IMM GRANULOCYTES NFR BLD AUTO: 0.4 % (ref 0–0.5)
INR PPP: 1.1 (ref 0.8–1.2)
INR PPP: 1.2 (ref 0.8–1.2)
LDH SERPL L TO P-CCNC: 0.83 MMOL/L (ref 0.36–1.25)
LDH SERPL L TO P-CCNC: 1.24 MMOL/L (ref 0.36–1.25)
LDH SERPL L TO P-CCNC: 1.25 MMOL/L (ref 0.36–1.25)
LDH SERPL L TO P-CCNC: 1.35 MMOL/L (ref 0.36–1.25)
LDH SERPL L TO P-CCNC: 1.53 MMOL/L (ref 0.36–1.25)
LDH SERPL L TO P-CCNC: 1.84 MMOL/L (ref 0.36–1.25)
LDH SERPL L TO P-CCNC: 307 U/L (ref 110–260)
LYMPHOCYTES # BLD AUTO: 0.7 K/UL (ref 1–4.8)
LYMPHOCYTES # BLD AUTO: 0.9 K/UL (ref 1–4.8)
LYMPHOCYTES # BLD AUTO: 1 K/UL (ref 1–4.8)
LYMPHOCYTES # BLD AUTO: 1 K/UL (ref 1–4.8)
LYMPHOCYTES NFR BLD: 5.4 % (ref 18–48)
LYMPHOCYTES NFR BLD: 7.5 % (ref 18–48)
LYMPHOCYTES NFR BLD: 7.5 % (ref 18–48)
LYMPHOCYTES NFR BLD: 7.9 % (ref 18–48)
LYMPHOCYTES NFR BLD: 8.6 % (ref 18–48)
LYMPHOCYTES NFR BLD: 8.7 % (ref 18–48)
LYMPHOCYTES NFR BLD: 8.8 % (ref 18–48)
MAGNESIUM SERPL-MCNC: 2.3 MG/DL (ref 1.6–2.6)
MAGNESIUM SERPL-MCNC: 2.4 MG/DL (ref 1.6–2.6)
MAGNESIUM SERPL-MCNC: 2.5 MG/DL (ref 1.6–2.6)
MAGNESIUM SERPL-MCNC: 2.7 MG/DL (ref 1.6–2.6)
MCH RBC QN AUTO: 30 PG (ref 27–31)
MCH RBC QN AUTO: 30.1 PG (ref 27–31)
MCH RBC QN AUTO: 30.3 PG (ref 27–31)
MCH RBC QN AUTO: 30.3 PG (ref 27–31)
MCH RBC QN AUTO: 30.4 PG (ref 27–31)
MCH RBC QN AUTO: 30.7 PG (ref 27–31)
MCH RBC QN AUTO: 30.7 PG (ref 27–31)
MCHC RBC AUTO-ENTMCNC: 31.1 G/DL (ref 32–36)
MCHC RBC AUTO-ENTMCNC: 31.4 G/DL (ref 32–36)
MCHC RBC AUTO-ENTMCNC: 31.5 G/DL (ref 32–36)
MCHC RBC AUTO-ENTMCNC: 31.5 G/DL (ref 32–36)
MCHC RBC AUTO-ENTMCNC: 31.7 G/DL (ref 32–36)
MCHC RBC AUTO-ENTMCNC: 32.4 G/DL (ref 32–36)
MCHC RBC AUTO-ENTMCNC: 32.9 G/DL (ref 32–36)
MCV RBC AUTO: 92 FL (ref 82–98)
MCV RBC AUTO: 95 FL (ref 82–98)
MCV RBC AUTO: 96 FL (ref 82–98)
MCV RBC AUTO: 97 FL (ref 82–98)
MCV RBC AUTO: 97 FL (ref 82–98)
METHEMOGLOBIN: 1.1 % (ref 0–3)
MIN VOL: 8
MIN VOL: 8
MIN VOL: 9
MODE: ABNORMAL
MONOCYTES # BLD AUTO: 1.3 K/UL (ref 0.3–1)
MONOCYTES # BLD AUTO: 1.5 K/UL (ref 0.3–1)
MONOCYTES # BLD AUTO: 1.6 K/UL (ref 0.3–1)
MONOCYTES # BLD AUTO: 1.7 K/UL (ref 0.3–1)
MONOCYTES # BLD AUTO: 1.8 K/UL (ref 0.3–1)
MONOCYTES NFR BLD: 12.2 % (ref 4–15)
MONOCYTES NFR BLD: 13.6 % (ref 4–15)
MONOCYTES NFR BLD: 13.7 % (ref 4–15)
MONOCYTES NFR BLD: 14.1 % (ref 4–15)
MONOCYTES NFR BLD: 14.2 % (ref 4–15)
NEUTROPHILS # BLD AUTO: 10.7 K/UL (ref 1.8–7.7)
NEUTROPHILS # BLD AUTO: 8.2 K/UL (ref 1.8–7.7)
NEUTROPHILS # BLD AUTO: 8.4 K/UL (ref 1.8–7.7)
NEUTROPHILS # BLD AUTO: 8.9 K/UL (ref 1.8–7.7)
NEUTROPHILS # BLD AUTO: 9.1 K/UL (ref 1.8–7.7)
NEUTROPHILS NFR BLD: 76.4 % (ref 38–73)
NEUTROPHILS NFR BLD: 77.2 % (ref 38–73)
NEUTROPHILS NFR BLD: 77.4 % (ref 38–73)
NEUTROPHILS NFR BLD: 78.1 % (ref 38–73)
NEUTROPHILS NFR BLD: 80.2 % (ref 38–73)
NRBC BLD-RTO: 0 /100 WBC
PCO2 BLDA: 33.8 MMHG (ref 35–45)
PCO2 BLDA: 37.8 MMHG (ref 35–45)
PCO2 BLDA: 41.8 MMHG (ref 35–45)
PCO2 BLDA: 43 MMHG (ref 35–45)
PCO2 BLDA: 43.6 MMHG (ref 35–45)
PCO2 BLDA: 43.8 MMHG (ref 35–45)
PCO2 BLDA: 47.6 MMHG (ref 35–45)
PEEP: 5
PH SMN: 7.32 [PH] (ref 7.35–7.45)
PH SMN: 7.35 [PH] (ref 7.35–7.45)
PH SMN: 7.39 [PH] (ref 7.35–7.45)
PH SMN: 7.39 [PH] (ref 7.35–7.45)
PH SMN: 7.41 [PH] (ref 7.35–7.45)
PH SMN: 7.42 [PH] (ref 7.35–7.45)
PH SMN: 7.49 [PH] (ref 7.35–7.45)
PHOSPHATE SERPL-MCNC: 2.7 MG/DL (ref 2.7–4.5)
PHOSPHATE SERPL-MCNC: 2.9 MG/DL (ref 2.7–4.5)
PHOSPHATE SERPL-MCNC: 3 MG/DL (ref 2.7–4.5)
PHOSPHATE SERPL-MCNC: 3.2 MG/DL (ref 2.7–4.5)
PHOSPHATE SERPL-MCNC: 3.5 MG/DL (ref 2.7–4.5)
PHOSPHATE SERPL-MCNC: 3.7 MG/DL (ref 2.7–4.5)
PIP: 20
PIP: 22
PIP: 23
PLATELET # BLD AUTO: 101 K/UL (ref 150–350)
PLATELET # BLD AUTO: 103 K/UL (ref 150–350)
PLATELET # BLD AUTO: 103 K/UL (ref 150–350)
PLATELET # BLD AUTO: 107 K/UL (ref 150–350)
PLATELET # BLD AUTO: 90 K/UL (ref 150–350)
PLATELET # BLD AUTO: 91 K/UL (ref 150–350)
PLATELET # BLD AUTO: 92 K/UL (ref 150–350)
PMV BLD AUTO: 10.7 FL (ref 9.2–12.9)
PMV BLD AUTO: 10.9 FL (ref 9.2–12.9)
PMV BLD AUTO: 11 FL (ref 9.2–12.9)
PMV BLD AUTO: 11.3 FL (ref 9.2–12.9)
PMV BLD AUTO: 11.3 FL (ref 9.2–12.9)
PO2 BLDA: 165 MMHG (ref 80–100)
PO2 BLDA: 170 MMHG (ref 80–100)
PO2 BLDA: 184 MMHG (ref 80–100)
PO2 BLDA: 187 MMHG (ref 80–100)
PO2 BLDA: 190 MMHG (ref 80–100)
PO2 BLDA: 192 MMHG (ref 80–100)
PO2 BLDA: 39 MMHG (ref 40–60)
POC BE: -1 MMOL/L
POC BE: -2 MMOL/L
POC BE: 0 MMOL/L
POC BE: 0 MMOL/L
POC BE: 2 MMOL/L
POC BE: 2 MMOL/L
POC BE: 3 MMOL/L
POC IONIZED CALCIUM: 1.21 MMOL/L (ref 1.06–1.42)
POC IONIZED CALCIUM: 1.23 MMOL/L (ref 1.06–1.42)
POC IONIZED CALCIUM: 1.23 MMOL/L (ref 1.06–1.42)
POC IONIZED CALCIUM: 1.25 MMOL/L (ref 1.06–1.42)
POC IONIZED CALCIUM: 1.25 MMOL/L (ref 1.06–1.42)
POC IONIZED CALCIUM: 1.27 MMOL/L (ref 1.06–1.42)
POC SATURATED O2: 100 % (ref 95–100)
POC SATURATED O2: 69 % (ref 95–100)
POC TCO2: 25 MMOL/L (ref 23–27)
POC TCO2: 26 MMOL/L (ref 23–27)
POC TCO2: 26 MMOL/L (ref 24–29)
POC TCO2: 27 MMOL/L (ref 23–27)
POC TCO2: 27 MMOL/L (ref 23–27)
POC TCO2: 28 MMOL/L (ref 23–27)
POC TCO2: 28 MMOL/L (ref 23–27)
POCT GLUCOSE: 130 MG/DL (ref 70–110)
POCT GLUCOSE: 132 MG/DL (ref 70–110)
POCT GLUCOSE: 134 MG/DL (ref 70–110)
POCT GLUCOSE: 134 MG/DL (ref 70–110)
POCT GLUCOSE: 148 MG/DL (ref 70–110)
POCT GLUCOSE: 151 MG/DL (ref 70–110)
POCT GLUCOSE: 156 MG/DL (ref 70–110)
POCT GLUCOSE: 160 MG/DL (ref 70–110)
POCT GLUCOSE: 165 MG/DL (ref 70–110)
POCT GLUCOSE: 167 MG/DL (ref 70–110)
POCT GLUCOSE: 169 MG/DL (ref 70–110)
POCT GLUCOSE: 172 MG/DL (ref 70–110)
POCT GLUCOSE: 173 MG/DL (ref 70–110)
POCT GLUCOSE: 184 MG/DL (ref 70–110)
POCT GLUCOSE: 184 MG/DL (ref 70–110)
POCT GLUCOSE: 185 MG/DL (ref 70–110)
POCT GLUCOSE: 188 MG/DL (ref 70–110)
POCT GLUCOSE: 188 MG/DL (ref 70–110)
POCT GLUCOSE: 189 MG/DL (ref 70–110)
POCT GLUCOSE: 189 MG/DL (ref 70–110)
POCT GLUCOSE: 192 MG/DL (ref 70–110)
POCT GLUCOSE: 195 MG/DL (ref 70–110)
POTASSIUM BLD-SCNC: 3.6 MMOL/L (ref 3.5–5.1)
POTASSIUM BLD-SCNC: 4 MMOL/L (ref 3.5–5.1)
POTASSIUM BLD-SCNC: 4.4 MMOL/L (ref 3.5–5.1)
POTASSIUM BLD-SCNC: 4.5 MMOL/L (ref 3.5–5.1)
POTASSIUM BLD-SCNC: 4.6 MMOL/L (ref 3.5–5.1)
POTASSIUM BLD-SCNC: 5 MMOL/L (ref 3.5–5.1)
POTASSIUM SERPL-SCNC: 3.7 MMOL/L (ref 3.5–5.1)
POTASSIUM SERPL-SCNC: 4 MMOL/L (ref 3.5–5.1)
POTASSIUM SERPL-SCNC: 4.5 MMOL/L (ref 3.5–5.1)
POTASSIUM SERPL-SCNC: 4.6 MMOL/L (ref 3.5–5.1)
POTASSIUM SERPL-SCNC: 5.1 MMOL/L (ref 3.5–5.1)
POTASSIUM SERPL-SCNC: 5.1 MMOL/L (ref 3.5–5.1)
PREALB SERPL-MCNC: 14 MG/DL (ref 20–43)
PROT SERPL-MCNC: 6.5 G/DL (ref 6–8.4)
PROTHROMBIN TIME: 11.6 SEC (ref 9–12.5)
PROTHROMBIN TIME: 11.6 SEC (ref 9–12.5)
PROTHROMBIN TIME: 11.8 SEC (ref 9–12.5)
PROTHROMBIN TIME: 11.9 SEC (ref 9–12.5)
PROTHROMBIN TIME: 12 SEC (ref 9–12.5)
PROTHROMBIN TIME: 12.7 SEC (ref 9–12.5)
RBC # BLD AUTO: 2.7 M/UL (ref 4.6–6.2)
RBC # BLD AUTO: 2.7 M/UL (ref 4.6–6.2)
RBC # BLD AUTO: 2.73 M/UL (ref 4.6–6.2)
RBC # BLD AUTO: 2.74 M/UL (ref 4.6–6.2)
RBC # BLD AUTO: 2.77 M/UL (ref 4.6–6.2)
RBC # BLD AUTO: 2.77 M/UL (ref 4.6–6.2)
RBC # BLD AUTO: 2.82 M/UL (ref 4.6–6.2)
SAMPLE: ABNORMAL
SAMPLE: NORMAL
SARS-COV-2 RDRP RESP QL NAA+PROBE: NEGATIVE
SITE: ABNORMAL
SITE: NORMAL
SODIUM BLD-SCNC: 138 MMOL/L (ref 136–145)
SODIUM BLD-SCNC: 139 MMOL/L (ref 136–145)
SODIUM BLD-SCNC: 139 MMOL/L (ref 136–145)
SODIUM BLD-SCNC: 140 MMOL/L (ref 136–145)
SODIUM BLD-SCNC: 140 MMOL/L (ref 136–145)
SODIUM BLD-SCNC: 141 MMOL/L (ref 136–145)
SODIUM SERPL-SCNC: 136 MMOL/L (ref 136–145)
SODIUM SERPL-SCNC: 137 MMOL/L (ref 136–145)
SODIUM SERPL-SCNC: 139 MMOL/L (ref 136–145)
SP02: 100
VANCOMYCIN TROUGH SERPL-MCNC: 4.3 UG/ML (ref 10–22)
VT: 450
VT: 480
WBC # BLD AUTO: 10.65 K/UL (ref 3.9–12.7)
WBC # BLD AUTO: 10.78 K/UL (ref 3.9–12.7)
WBC # BLD AUTO: 11.51 K/UL (ref 3.9–12.7)
WBC # BLD AUTO: 11.66 K/UL (ref 3.9–12.7)
WBC # BLD AUTO: 11.66 K/UL (ref 3.9–12.7)
WBC # BLD AUTO: 11.92 K/UL (ref 3.9–12.7)
WBC # BLD AUTO: 13.32 K/UL (ref 3.9–12.7)

## 2021-01-14 PROCEDURE — 63600175 PHARM REV CODE 636 W HCPCS: Performed by: STUDENT IN AN ORGANIZED HEALTH CARE EDUCATION/TRAINING PROGRAM

## 2021-01-14 PROCEDURE — D9220A PRA ANESTHESIA: ICD-10-PCS | Mod: ,,, | Performed by: ANESTHESIOLOGY

## 2021-01-14 PROCEDURE — 82803 BLOOD GASES ANY COMBINATION: CPT

## 2021-01-14 PROCEDURE — 99233 PR SUBSEQUENT HOSPITAL CARE,LEVL III: ICD-10-PCS | Mod: 24,25,, | Performed by: THORACIC SURGERY (CARDIOTHORACIC VASCULAR SURGERY)

## 2021-01-14 PROCEDURE — 21750 REPAIR OF STERNUM SEPARATION: CPT | Mod: 58,,, | Performed by: THORACIC SURGERY (CARDIOTHORACIC VASCULAR SURGERY)

## 2021-01-14 PROCEDURE — 85014 HEMATOCRIT: CPT

## 2021-01-14 PROCEDURE — 97802 MEDICAL NUTRITION INDIV IN: CPT

## 2021-01-14 PROCEDURE — 94761 N-INVAS EAR/PLS OXIMETRY MLT: CPT

## 2021-01-14 PROCEDURE — 93750 INTERROGATION VAD IN PERSON: CPT | Mod: ,,, | Performed by: INTERNAL MEDICINE

## 2021-01-14 PROCEDURE — 88300 PR  SURG PATH,GROSS,LEVEL I: ICD-10-PCS | Mod: 26,,, | Performed by: PATHOLOGY

## 2021-01-14 PROCEDURE — 27000248 HC VAD-ADDITIONAL DAY

## 2021-01-14 PROCEDURE — 37799 UNLISTED PX VASCULAR SURGERY: CPT

## 2021-01-14 PROCEDURE — 25000003 PHARM REV CODE 250: Performed by: STUDENT IN AN ORGANIZED HEALTH CARE EDUCATION/TRAINING PROGRAM

## 2021-01-14 PROCEDURE — 85610 PROTHROMBIN TIME: CPT | Mod: 91

## 2021-01-14 PROCEDURE — 84100 ASSAY OF PHOSPHORUS: CPT | Mod: 91

## 2021-01-14 PROCEDURE — 99233 SBSQ HOSP IP/OBS HIGH 50: CPT | Mod: 24,25,, | Performed by: THORACIC SURGERY (CARDIOTHORACIC VASCULAR SURGERY)

## 2021-01-14 PROCEDURE — 99900035 HC TECH TIME PER 15 MIN (STAT)

## 2021-01-14 PROCEDURE — 80048 BASIC METABOLIC PNL TOTAL CA: CPT | Mod: 91

## 2021-01-14 PROCEDURE — 20000000 HC ICU ROOM

## 2021-01-14 PROCEDURE — 99900026 HC AIRWAY MAINTENANCE (STAT)

## 2021-01-14 PROCEDURE — 84295 ASSAY OF SERUM SODIUM: CPT

## 2021-01-14 PROCEDURE — 83735 ASSAY OF MAGNESIUM: CPT | Mod: 91

## 2021-01-14 PROCEDURE — 85730 THROMBOPLASTIN TIME PARTIAL: CPT | Mod: 91

## 2021-01-14 PROCEDURE — 93750 PR INTERROGATE VENT ASSIST DEV, IN PERSON, W PHYSICIAN ANALYSIS: ICD-10-PCS | Mod: ,,, | Performed by: INTERNAL MEDICINE

## 2021-01-14 PROCEDURE — 63600175 PHARM REV CODE 636 W HCPCS: Performed by: THORACIC SURGERY (CARDIOTHORACIC VASCULAR SURGERY)

## 2021-01-14 PROCEDURE — 21750 PR CLOSE MED STERNOTOMY SEP, W/WO DEBRIDE: ICD-10-PCS | Mod: 58,,, | Performed by: THORACIC SURGERY (CARDIOTHORACIC VASCULAR SURGERY)

## 2021-01-14 PROCEDURE — 27000239 HC STAND-BY BYPASS PUMP

## 2021-01-14 PROCEDURE — 83615 LACTATE (LD) (LDH) ENZYME: CPT

## 2021-01-14 PROCEDURE — 99233 SBSQ HOSP IP/OBS HIGH 50: CPT | Mod: ,,, | Performed by: INTERNAL MEDICINE

## 2021-01-14 PROCEDURE — 85025 COMPLETE CBC W/AUTO DIFF WBC: CPT | Mod: 91

## 2021-01-14 PROCEDURE — 85384 FIBRINOGEN ACTIVITY: CPT

## 2021-01-14 PROCEDURE — 84134 ASSAY OF PREALBUMIN: CPT

## 2021-01-14 PROCEDURE — 27000221 HC OXYGEN, UP TO 24 HOURS

## 2021-01-14 PROCEDURE — 36000712 HC OR TIME LEV V 1ST 15 MIN: Performed by: THORACIC SURGERY (CARDIOTHORACIC VASCULAR SURGERY)

## 2021-01-14 PROCEDURE — 99291 CRITICAL CARE FIRST HOUR: CPT | Mod: GC,,, | Performed by: SURGERY

## 2021-01-14 PROCEDURE — 83050 HGB METHEMOGLOBIN QUAN: CPT

## 2021-01-14 PROCEDURE — 99233 PR SUBSEQUENT HOSPITAL CARE,LEVL III: ICD-10-PCS | Mod: ,,, | Performed by: INTERNAL MEDICINE

## 2021-01-14 PROCEDURE — 94003 VENT MGMT INPAT SUBQ DAY: CPT

## 2021-01-14 PROCEDURE — 27201423 OPTIME MED/SURG SUP & DEVICES STERILE SUPPLY: Performed by: THORACIC SURGERY (CARDIOTHORACIC VASCULAR SURGERY)

## 2021-01-14 PROCEDURE — C9113 INJ PANTOPRAZOLE SODIUM, VIA: HCPCS | Performed by: THORACIC SURGERY (CARDIOTHORACIC VASCULAR SURGERY)

## 2021-01-14 PROCEDURE — 99232 PR SUBSEQUENT HOSPITAL CARE,LEVL II: ICD-10-PCS | Mod: ,,, | Performed by: NURSE PRACTITIONER

## 2021-01-14 PROCEDURE — D9220A PRA ANESTHESIA: Mod: ,,, | Performed by: ANESTHESIOLOGY

## 2021-01-14 PROCEDURE — 27000685 HC LVAD KIT (30 DAY SUPPLY)

## 2021-01-14 PROCEDURE — 99291 PR CRITICAL CARE, E/M 30-74 MINUTES: ICD-10-PCS | Mod: GC,,, | Performed by: SURGERY

## 2021-01-14 PROCEDURE — 25000003 PHARM REV CODE 250: Performed by: HOSPITALIST

## 2021-01-14 PROCEDURE — 83605 ASSAY OF LACTIC ACID: CPT

## 2021-01-14 PROCEDURE — C1768 GRAFT, VASCULAR: HCPCS | Performed by: THORACIC SURGERY (CARDIOTHORACIC VASCULAR SURGERY)

## 2021-01-14 PROCEDURE — 80053 COMPREHEN METABOLIC PANEL: CPT

## 2021-01-14 PROCEDURE — 93750 PR INTERROGATE VENT ASSIST DEV, IN PERSON, W PHYSICIAN ANALYSIS: ICD-10-PCS | Mod: ,,, | Performed by: THORACIC SURGERY (CARDIOTHORACIC VASCULAR SURGERY)

## 2021-01-14 PROCEDURE — 82330 ASSAY OF CALCIUM: CPT

## 2021-01-14 PROCEDURE — C1729 CATH, DRAINAGE: HCPCS | Performed by: THORACIC SURGERY (CARDIOTHORACIC VASCULAR SURGERY)

## 2021-01-14 PROCEDURE — U0002 COVID-19 LAB TEST NON-CDC: HCPCS

## 2021-01-14 PROCEDURE — 85610 PROTHROMBIN TIME: CPT

## 2021-01-14 PROCEDURE — 99232 SBSQ HOSP IP/OBS MODERATE 35: CPT | Mod: ,,, | Performed by: NURSE PRACTITIONER

## 2021-01-14 PROCEDURE — 80202 ASSAY OF VANCOMYCIN: CPT

## 2021-01-14 PROCEDURE — 88300 SURGICAL PATH GROSS: CPT | Mod: 26,,, | Performed by: PATHOLOGY

## 2021-01-14 PROCEDURE — 93750 INTERROGATION VAD IN PERSON: CPT | Mod: ,,, | Performed by: THORACIC SURGERY (CARDIOTHORACIC VASCULAR SURGERY)

## 2021-01-14 PROCEDURE — 37000008 HC ANESTHESIA 1ST 15 MINUTES: Performed by: THORACIC SURGERY (CARDIOTHORACIC VASCULAR SURGERY)

## 2021-01-14 PROCEDURE — 63600367 HC NITRIC OXIDE PER HOUR

## 2021-01-14 PROCEDURE — 63600150 PHARM REV CODE 636: Performed by: THORACIC SURGERY (CARDIOTHORACIC VASCULAR SURGERY)

## 2021-01-14 PROCEDURE — 36000713 HC OR TIME LEV V EA ADD 15 MIN: Performed by: THORACIC SURGERY (CARDIOTHORACIC VASCULAR SURGERY)

## 2021-01-14 PROCEDURE — 84132 ASSAY OF SERUM POTASSIUM: CPT

## 2021-01-14 PROCEDURE — 88300 SURGICAL PATH GROSS: CPT | Performed by: PATHOLOGY

## 2021-01-14 PROCEDURE — 37000009 HC ANESTHESIA EA ADD 15 MINS: Performed by: THORACIC SURGERY (CARDIOTHORACIC VASCULAR SURGERY)

## 2021-01-14 DEVICE — MEMBRANE PERICARDIAL 15X20CM: Type: IMPLANTABLE DEVICE | Site: CHEST | Status: FUNCTIONAL

## 2021-01-14 RX ORDER — FUROSEMIDE 10 MG/ML
10 INJECTION INTRAMUSCULAR; INTRAVENOUS ONCE
Status: COMPLETED | OUTPATIENT
Start: 2021-01-14 | End: 2021-01-14

## 2021-01-14 RX ORDER — ROCURONIUM BROMIDE 10 MG/ML
INJECTION, SOLUTION INTRAVENOUS
Status: DISCONTINUED | OUTPATIENT
Start: 2021-01-14 | End: 2021-01-14

## 2021-01-14 RX ORDER — FENTANYL CITRATE 50 UG/ML
INJECTION, SOLUTION INTRAMUSCULAR; INTRAVENOUS
Status: DISCONTINUED | OUTPATIENT
Start: 2021-01-14 | End: 2021-01-14

## 2021-01-14 RX ORDER — NEOSTIGMINE METHYLSULFATE 0.5 MG/ML
INJECTION, SOLUTION INTRAVENOUS
Status: DISCONTINUED | OUTPATIENT
Start: 2021-01-14 | End: 2021-01-14

## 2021-01-14 RX ORDER — DEXMEDETOMIDINE HYDROCHLORIDE 4 UG/ML
0-1.4 INJECTION, SOLUTION INTRAVENOUS CONTINUOUS
Status: DISCONTINUED | OUTPATIENT
Start: 2021-01-14 | End: 2021-01-15

## 2021-01-14 RX ORDER — MIDAZOLAM HYDROCHLORIDE 1 MG/ML
INJECTION, SOLUTION INTRAMUSCULAR; INTRAVENOUS
Status: DISCONTINUED | OUTPATIENT
Start: 2021-01-14 | End: 2021-01-14

## 2021-01-14 RX ADMIN — MUPIROCIN: 20 OINTMENT TOPICAL at 08:01

## 2021-01-14 RX ADMIN — FUROSEMIDE 10 MG: 10 INJECTION, SOLUTION INTRAMUSCULAR; INTRAVENOUS at 08:01

## 2021-01-14 RX ADMIN — FENTANYL CITRATE 50 MCG: 50 INJECTION INTRAMUSCULAR; INTRAVENOUS at 03:01

## 2021-01-14 RX ADMIN — MIDAZOLAM HYDROCHLORIDE 1 MG: 1 INJECTION, SOLUTION INTRAMUSCULAR; INTRAVENOUS at 12:01

## 2021-01-14 RX ADMIN — ROCURONIUM BROMIDE 50 MG: 10 INJECTION, SOLUTION INTRAVENOUS at 01:01

## 2021-01-14 RX ADMIN — FENTANYL CITRATE 50 MCG: 50 INJECTION, SOLUTION INTRAMUSCULAR; INTRAVENOUS at 01:01

## 2021-01-14 RX ADMIN — CIPROFLOXACIN 400 MG: 2 INJECTION, SOLUTION INTRAVENOUS at 08:01

## 2021-01-14 RX ADMIN — NICARDIPINE HYDROCHLORIDE 1 MG/HR: 0.2 INJECTION, SOLUTION INTRAVENOUS at 01:01

## 2021-01-14 RX ADMIN — ATORVASTATIN CALCIUM 80 MG: 20 TABLET, FILM COATED ORAL at 08:01

## 2021-01-14 RX ADMIN — PANTOPRAZOLE SODIUM 40 MG: 40 INJECTION, POWDER, FOR SOLUTION INTRAVENOUS at 08:01

## 2021-01-14 RX ADMIN — FENTANYL CITRATE 50 MCG: 50 INJECTION INTRAMUSCULAR; INTRAVENOUS at 01:01

## 2021-01-14 RX ADMIN — POTASSIUM CHLORIDE 40 MEQ: 14.9 INJECTION, SOLUTION INTRAVENOUS at 03:01

## 2021-01-14 RX ADMIN — FENTANYL CITRATE 50 MCG: 50 INJECTION, SOLUTION INTRAMUSCULAR; INTRAVENOUS at 02:01

## 2021-01-14 RX ADMIN — FENTANYL CITRATE 100 MCG: 50 INJECTION, SOLUTION INTRAMUSCULAR; INTRAVENOUS at 12:01

## 2021-01-14 RX ADMIN — MAGNESIUM SULFATE 2 G: 2 INJECTION INTRAVENOUS at 03:01

## 2021-01-14 RX ADMIN — PROPOFOL 10 MCG/KG/MIN: 10 INJECTION, EMULSION INTRAVENOUS at 07:01

## 2021-01-14 RX ADMIN — VANCOMYCIN HYDROCHLORIDE 1250 MG: 10 INJECTION, POWDER, LYOPHILIZED, FOR SOLUTION INTRAVENOUS at 10:01

## 2021-01-14 RX ADMIN — MAGNESIUM SULFATE 2 G: 2 INJECTION INTRAVENOUS at 06:01

## 2021-01-14 RX ADMIN — PROPOFOL 25 MCG/KG/MIN: 10 INJECTION, EMULSION INTRAVENOUS at 01:01

## 2021-01-14 RX ADMIN — NEOSTIGMINE METHYLSULFATE 5 MG: 0.5 INJECTION INTRAVENOUS at 02:01

## 2021-01-14 RX ADMIN — ASPIRIN 325 MG ORAL TABLET 325 MG: 325 PILL ORAL at 08:01

## 2021-01-14 RX ADMIN — MIDAZOLAM HYDROCHLORIDE 1 MG: 1 INJECTION, SOLUTION INTRAMUSCULAR; INTRAVENOUS at 01:01

## 2021-01-14 RX ADMIN — DEXMEDETOMIDINE HYDROCHLORIDE 0.2 MCG/KG/HR: 4 INJECTION, SOLUTION INTRAVENOUS at 04:01

## 2021-01-14 RX ADMIN — POLYETHYLENE GLYCOL 3350 17 G: 17 POWDER, FOR SOLUTION ORAL at 08:01

## 2021-01-14 RX ADMIN — FENTANYL CITRATE 50 MCG: 50 INJECTION INTRAMUSCULAR; INTRAVENOUS at 07:01

## 2021-01-14 RX ADMIN — FENTANYL CITRATE 50 MCG: 50 INJECTION INTRAMUSCULAR; INTRAVENOUS at 08:01

## 2021-01-14 RX ADMIN — ROCURONIUM BROMIDE 50 MG: 10 INJECTION, SOLUTION INTRAVENOUS at 12:01

## 2021-01-14 RX ADMIN — PROPOFOL 25 MCG/KG/MIN: 10 INJECTION, EMULSION INTRAVENOUS at 06:01

## 2021-01-14 RX ADMIN — PROPOFOL 35 MCG/KG/MIN: 10 INJECTION, EMULSION INTRAVENOUS at 12:01

## 2021-01-14 RX ADMIN — FENTANYL CITRATE 50 MCG: 50 INJECTION INTRAMUSCULAR; INTRAVENOUS at 06:01

## 2021-01-14 RX ADMIN — FUROSEMIDE 5 MG/HR: 10 INJECTION, SOLUTION INTRAMUSCULAR; INTRAVENOUS at 09:01

## 2021-01-15 LAB
ALBUMIN SERPL BCP-MCNC: 3.1 G/DL (ref 3.5–5.2)
ALBUMIN SERPL BCP-MCNC: 3.1 G/DL (ref 3.5–5.2)
ALLENS TEST: ABNORMAL
ALLENS TEST: NORMAL
ALP SERPL-CCNC: 53 U/L (ref 55–135)
ALP SERPL-CCNC: 53 U/L (ref 55–135)
ALT SERPL W/O P-5'-P-CCNC: 11 U/L (ref 10–44)
ALT SERPL W/O P-5'-P-CCNC: 11 U/L (ref 10–44)
ANION GAP SERPL CALC-SCNC: 10 MMOL/L (ref 8–16)
ANION GAP SERPL CALC-SCNC: 10 MMOL/L (ref 8–16)
ANION GAP SERPL CALC-SCNC: 8 MMOL/L (ref 8–16)
ANION GAP SERPL CALC-SCNC: 9 MMOL/L (ref 8–16)
ANISOCYTOSIS BLD QL SMEAR: SLIGHT
APTT BLDCRRT: 27.6 SEC (ref 21–32)
APTT BLDCRRT: 30.5 SEC (ref 21–32)
APTT BLDCRRT: 30.6 SEC (ref 21–32)
APTT BLDCRRT: 30.8 SEC (ref 21–32)
APTT BLDCRRT: 32.5 SEC (ref 21–32)
APTT BLDCRRT: 33 SEC (ref 21–32)
AST SERPL-CCNC: 63 U/L (ref 10–40)
AST SERPL-CCNC: 63 U/L (ref 10–40)
BASO STIPL BLD QL SMEAR: ABNORMAL
BASOPHILS # BLD AUTO: 0.01 K/UL (ref 0–0.2)
BASOPHILS # BLD AUTO: 0.02 K/UL (ref 0–0.2)
BASOPHILS # BLD AUTO: ABNORMAL K/UL (ref 0–0.2)
BASOPHILS NFR BLD: 0 % (ref 0–1.9)
BASOPHILS NFR BLD: 0.1 % (ref 0–1.9)
BASOPHILS NFR BLD: 0.2 % (ref 0–1.9)
BASOPHILS NFR BLD: 0.2 % (ref 0–1.9)
BILIRUB DIRECT SERPL-MCNC: 2.1 MG/DL (ref 0.1–0.3)
BILIRUB DIRECT SERPL-MCNC: 2.1 MG/DL (ref 0.1–0.3)
BILIRUB SERPL-MCNC: 3 MG/DL (ref 0.1–1)
BILIRUB SERPL-MCNC: 3 MG/DL (ref 0.1–1)
BLD PROD TYP BPU: NORMAL
BLOOD UNIT EXPIRATION DATE: NORMAL
BLOOD UNIT TYPE CODE: 5100
BLOOD UNIT TYPE CODE: 9500
BLOOD UNIT TYPE: NORMAL
BNP SERPL-MCNC: 522 PG/ML (ref 0–99)
BUN SERPL-MCNC: 25 MG/DL (ref 8–23)
BUN SERPL-MCNC: 25 MG/DL (ref 8–23)
BUN SERPL-MCNC: 26 MG/DL (ref 8–23)
BUN SERPL-MCNC: 26 MG/DL (ref 8–23)
BUN SERPL-MCNC: 27 MG/DL (ref 8–23)
BUN SERPL-MCNC: 27 MG/DL (ref 8–23)
CALCIUM SERPL-MCNC: 8.5 MG/DL (ref 8.7–10.5)
CALCIUM SERPL-MCNC: 8.5 MG/DL (ref 8.7–10.5)
CALCIUM SERPL-MCNC: 8.7 MG/DL (ref 8.7–10.5)
CALCIUM SERPL-MCNC: 8.8 MG/DL (ref 8.7–10.5)
CHLORIDE SERPL-SCNC: 102 MMOL/L (ref 95–110)
CHLORIDE SERPL-SCNC: 104 MMOL/L (ref 95–110)
CHLORIDE SERPL-SCNC: 105 MMOL/L (ref 95–110)
CHLORIDE SERPL-SCNC: 106 MMOL/L (ref 95–110)
CO2 SERPL-SCNC: 23 MMOL/L (ref 23–29)
CO2 SERPL-SCNC: 24 MMOL/L (ref 23–29)
CO2 SERPL-SCNC: 24 MMOL/L (ref 23–29)
CO2 SERPL-SCNC: 25 MMOL/L (ref 23–29)
CODING SYSTEM: NORMAL
CREAT SERPL-MCNC: 1 MG/DL (ref 0.5–1.4)
CREAT SERPL-MCNC: 1.1 MG/DL (ref 0.5–1.4)
CRP SERPL-MCNC: 232.7 MG/L (ref 0–8.2)
DELSYS: ABNORMAL
DELSYS: ABNORMAL
DIFFERENTIAL METHOD: ABNORMAL
DISPENSE STATUS: NORMAL
EOSINOPHIL # BLD AUTO: 0 K/UL (ref 0–0.5)
EOSINOPHIL # BLD AUTO: ABNORMAL K/UL (ref 0–0.5)
EOSINOPHIL NFR BLD: 0 % (ref 0–8)
EOSINOPHIL NFR BLD: 0 % (ref 0–8)
EOSINOPHIL NFR BLD: 0.1 % (ref 0–8)
EOSINOPHIL NFR BLD: 0.2 % (ref 0–8)
EOSINOPHIL NFR BLD: 0.2 % (ref 0–8)
ERYTHROCYTE [DISTWIDTH] IN BLOOD BY AUTOMATED COUNT: 13.7 % (ref 11.5–14.5)
ERYTHROCYTE [DISTWIDTH] IN BLOOD BY AUTOMATED COUNT: 13.8 % (ref 11.5–14.5)
ERYTHROCYTE [DISTWIDTH] IN BLOOD BY AUTOMATED COUNT: 13.9 % (ref 11.5–14.5)
ERYTHROCYTE [SEDIMENTATION RATE] IN BLOOD BY WESTERGREN METHOD: 16 MM/H
ERYTHROCYTE [SEDIMENTATION RATE] IN BLOOD BY WESTERGREN METHOD: 16 MM/H
EST. GFR  (AFRICAN AMERICAN): >60 ML/MIN/1.73 M^2
EST. GFR  (NON AFRICAN AMERICAN): >60 ML/MIN/1.73 M^2
FIO2: 40
FIO2: 40
GLUCOSE SERPL-MCNC: 158 MG/DL (ref 70–110)
GLUCOSE SERPL-MCNC: 165 MG/DL (ref 70–110)
GLUCOSE SERPL-MCNC: 166 MG/DL (ref 70–110)
GLUCOSE SERPL-MCNC: 167 MG/DL (ref 70–110)
GLUCOSE SERPL-MCNC: 173 MG/DL (ref 70–110)
GLUCOSE SERPL-MCNC: 204 MG/DL (ref 70–110)
GLUCOSE SERPL-MCNC: 216 MG/DL (ref 70–110)
HCO3 UR-SCNC: 25.1 MMOL/L (ref 24–28)
HCO3 UR-SCNC: 25.4 MMOL/L (ref 24–28)
HCO3 UR-SCNC: 26.2 MMOL/L (ref 24–28)
HCO3 UR-SCNC: 26.6 MMOL/L (ref 24–28)
HCO3 UR-SCNC: 27.8 MMOL/L (ref 24–28)
HCT VFR BLD AUTO: 24.9 % (ref 40–54)
HCT VFR BLD AUTO: 25 % (ref 40–54)
HCT VFR BLD AUTO: 25.5 % (ref 40–54)
HCT VFR BLD AUTO: 25.7 % (ref 40–54)
HCT VFR BLD AUTO: 26.4 % (ref 40–54)
HCT VFR BLD AUTO: 26.4 % (ref 40–54)
HCT VFR BLD AUTO: 28 % (ref 40–54)
HCT VFR BLD CALC: 24 %PCV (ref 36–54)
HCT VFR BLD CALC: 25 %PCV (ref 36–54)
HGB BLD-MCNC: 7.9 G/DL (ref 14–18)
HGB BLD-MCNC: 8 G/DL (ref 14–18)
HGB BLD-MCNC: 8.1 G/DL (ref 14–18)
HGB BLD-MCNC: 8.2 G/DL (ref 14–18)
HGB BLD-MCNC: 8.7 G/DL (ref 14–18)
HYPOCHROMIA BLD QL SMEAR: ABNORMAL
IMM GRANULOCYTES # BLD AUTO: 0.05 K/UL (ref 0–0.04)
IMM GRANULOCYTES # BLD AUTO: 0.05 K/UL (ref 0–0.04)
IMM GRANULOCYTES # BLD AUTO: 0.06 K/UL (ref 0–0.04)
IMM GRANULOCYTES # BLD AUTO: 0.07 K/UL (ref 0–0.04)
IMM GRANULOCYTES # BLD AUTO: 0.07 K/UL (ref 0–0.04)
IMM GRANULOCYTES # BLD AUTO: 0.08 K/UL (ref 0–0.04)
IMM GRANULOCYTES # BLD AUTO: ABNORMAL K/UL (ref 0–0.04)
IMM GRANULOCYTES NFR BLD AUTO: 0.4 % (ref 0–0.5)
IMM GRANULOCYTES NFR BLD AUTO: 0.4 % (ref 0–0.5)
IMM GRANULOCYTES NFR BLD AUTO: 0.5 % (ref 0–0.5)
IMM GRANULOCYTES NFR BLD AUTO: 0.6 % (ref 0–0.5)
IMM GRANULOCYTES NFR BLD AUTO: ABNORMAL % (ref 0–0.5)
INR PPP: 1.1 (ref 0.8–1.2)
INR PPP: 1.2 (ref 0.8–1.2)
LDH SERPL L TO P-CCNC: 0.83 MMOL/L (ref 0.36–1.25)
LDH SERPL L TO P-CCNC: 0.92 MMOL/L (ref 0.36–1.25)
LDH SERPL L TO P-CCNC: 0.93 MMOL/L (ref 0.36–1.25)
LDH SERPL L TO P-CCNC: 324 U/L (ref 110–260)
LYMPHOCYTES # BLD AUTO: 0.7 K/UL (ref 1–4.8)
LYMPHOCYTES # BLD AUTO: 0.9 K/UL (ref 1–4.8)
LYMPHOCYTES # BLD AUTO: 1 K/UL (ref 1–4.8)
LYMPHOCYTES # BLD AUTO: 1 K/UL (ref 1–4.8)
LYMPHOCYTES # BLD AUTO: 1.1 K/UL (ref 1–4.8)
LYMPHOCYTES # BLD AUTO: 1.3 K/UL (ref 1–4.8)
LYMPHOCYTES # BLD AUTO: ABNORMAL K/UL (ref 1–4.8)
LYMPHOCYTES NFR BLD: 5.3 % (ref 18–48)
LYMPHOCYTES NFR BLD: 6.3 % (ref 18–48)
LYMPHOCYTES NFR BLD: 7 % (ref 18–48)
LYMPHOCYTES NFR BLD: 7.4 % (ref 18–48)
LYMPHOCYTES NFR BLD: 7.4 % (ref 18–48)
LYMPHOCYTES NFR BLD: 9.5 % (ref 18–48)
LYMPHOCYTES NFR BLD: 9.6 % (ref 18–48)
MAGNESIUM SERPL-MCNC: 2 MG/DL (ref 1.6–2.6)
MAGNESIUM SERPL-MCNC: 2.2 MG/DL (ref 1.6–2.6)
MAGNESIUM SERPL-MCNC: 2.3 MG/DL (ref 1.6–2.6)
MAGNESIUM SERPL-MCNC: 2.4 MG/DL (ref 1.6–2.6)
MAGNESIUM SERPL-MCNC: 2.6 MG/DL (ref 1.6–2.6)
MAGNESIUM SERPL-MCNC: 2.9 MG/DL (ref 1.6–2.6)
MCH RBC QN AUTO: 30 PG (ref 27–31)
MCH RBC QN AUTO: 30 PG (ref 27–31)
MCH RBC QN AUTO: 30.2 PG (ref 27–31)
MCH RBC QN AUTO: 30.3 PG (ref 27–31)
MCH RBC QN AUTO: 30.5 PG (ref 27–31)
MCH RBC QN AUTO: 30.5 PG (ref 27–31)
MCH RBC QN AUTO: 30.8 PG (ref 27–31)
MCHC RBC AUTO-ENTMCNC: 31.1 G/DL (ref 32–36)
MCHC RBC AUTO-ENTMCNC: 31.7 G/DL (ref 32–36)
MCHC RBC AUTO-ENTMCNC: 31.8 G/DL (ref 32–36)
MCHC RBC AUTO-ENTMCNC: 31.9 G/DL (ref 32–36)
MCHC RBC AUTO-ENTMCNC: 32 G/DL (ref 32–36)
MCV RBC AUTO: 95 FL (ref 82–98)
MCV RBC AUTO: 97 FL (ref 82–98)
MCV RBC AUTO: 98 FL (ref 82–98)
METHEMOGLOBIN: 1.2 % (ref 0–3)
MIN VOL: 8
MIN VOL: 9
MODE: ABNORMAL
MODE: ABNORMAL
MONOCYTES # BLD AUTO: 1.5 K/UL (ref 0.3–1)
MONOCYTES # BLD AUTO: 1.6 K/UL (ref 0.3–1)
MONOCYTES # BLD AUTO: 1.6 K/UL (ref 0.3–1)
MONOCYTES # BLD AUTO: 1.7 K/UL (ref 0.3–1)
MONOCYTES # BLD AUTO: 1.9 K/UL (ref 0.3–1)
MONOCYTES # BLD AUTO: 1.9 K/UL (ref 0.3–1)
MONOCYTES # BLD AUTO: ABNORMAL K/UL (ref 0.3–1)
MONOCYTES NFR BLD: 12 % (ref 4–15)
MONOCYTES NFR BLD: 12 % (ref 4–15)
MONOCYTES NFR BLD: 12.1 % (ref 4–15)
MONOCYTES NFR BLD: 12.3 % (ref 4–15)
MONOCYTES NFR BLD: 12.3 % (ref 4–15)
MONOCYTES NFR BLD: 14.5 % (ref 4–15)
MONOCYTES NFR BLD: 14.5 % (ref 4–15)
NEUTROPHILS # BLD AUTO: 10.1 K/UL (ref 1.8–7.7)
NEUTROPHILS # BLD AUTO: 10.1 K/UL (ref 1.8–7.7)
NEUTROPHILS # BLD AUTO: 10.3 K/UL (ref 1.8–7.7)
NEUTROPHILS # BLD AUTO: 10.7 K/UL (ref 1.8–7.7)
NEUTROPHILS # BLD AUTO: 11.2 K/UL (ref 1.8–7.7)
NEUTROPHILS # BLD AUTO: 9.2 K/UL (ref 1.8–7.7)
NEUTROPHILS NFR BLD: 77 % (ref 38–73)
NEUTROPHILS NFR BLD: 77.4 % (ref 38–73)
NEUTROPHILS NFR BLD: 77.7 % (ref 38–73)
NEUTROPHILS NFR BLD: 80.7 % (ref 38–73)
NEUTROPHILS NFR BLD: 81.8 % (ref 38–73)
NEUTS BAND NFR BLD MANUAL: 4 %
NRBC BLD-RTO: 0 /100 WBC
NUM UNITS TRANS FFP: NORMAL
OVALOCYTES BLD QL SMEAR: ABNORMAL
PCO2 BLDA: 36.3 MMHG (ref 35–45)
PCO2 BLDA: 37 MMHG (ref 35–45)
PCO2 BLDA: 38.9 MMHG (ref 35–45)
PCO2 BLDA: 40.9 MMHG (ref 35–45)
PCO2 BLDA: 44.8 MMHG (ref 35–45)
PEEP: 5
PEEP: 5
PH SMN: 7.38 [PH] (ref 7.35–7.45)
PH SMN: 7.42 [PH] (ref 7.35–7.45)
PH SMN: 7.42 [PH] (ref 7.35–7.45)
PH SMN: 7.45 [PH] (ref 7.35–7.45)
PH SMN: 7.48 [PH] (ref 7.35–7.45)
PHOSPHATE SERPL-MCNC: 2.2 MG/DL (ref 2.7–4.5)
PHOSPHATE SERPL-MCNC: 2.6 MG/DL (ref 2.7–4.5)
PHOSPHATE SERPL-MCNC: 2.6 MG/DL (ref 2.7–4.5)
PHOSPHATE SERPL-MCNC: 2.7 MG/DL (ref 2.7–4.5)
PHOSPHATE SERPL-MCNC: 2.8 MG/DL (ref 2.7–4.5)
PHOSPHATE SERPL-MCNC: 2.9 MG/DL (ref 2.7–4.5)
PIP: 20
PIP: 20
PLATELET # BLD AUTO: 82 K/UL (ref 150–350)
PLATELET # BLD AUTO: 84 K/UL (ref 150–350)
PLATELET # BLD AUTO: 84 K/UL (ref 150–350)
PLATELET # BLD AUTO: 88 K/UL (ref 150–350)
PLATELET # BLD AUTO: 88 K/UL (ref 150–350)
PLATELET # BLD AUTO: 94 K/UL (ref 150–350)
PLATELET # BLD AUTO: 95 K/UL (ref 150–350)
PLATELET BLD QL SMEAR: ABNORMAL
PLATELET BLD QL SMEAR: ABNORMAL
PMV BLD AUTO: 10.8 FL (ref 9.2–12.9)
PMV BLD AUTO: 10.9 FL (ref 9.2–12.9)
PMV BLD AUTO: 11.4 FL (ref 9.2–12.9)
PMV BLD AUTO: 11.8 FL (ref 9.2–12.9)
PMV BLD AUTO: 12 FL (ref 9.2–12.9)
PO2 BLDA: 151 MMHG (ref 80–100)
PO2 BLDA: 162 MMHG (ref 80–100)
PO2 BLDA: 172 MMHG (ref 80–100)
PO2 BLDA: 38 MMHG (ref 40–60)
PO2 BLDA: 521 MMHG (ref 80–100)
POC BE: 1 MMOL/L
POC BE: 2 MMOL/L
POC BE: 4 MMOL/L
POC IONIZED CALCIUM: 1.19 MMOL/L (ref 1.06–1.42)
POC IONIZED CALCIUM: 1.19 MMOL/L (ref 1.06–1.42)
POC IONIZED CALCIUM: 1.21 MMOL/L (ref 1.06–1.42)
POC IONIZED CALCIUM: 1.24 MMOL/L (ref 1.06–1.42)
POC SATURATED O2: 100 % (ref 95–100)
POC SATURATED O2: 100 % (ref 95–100)
POC SATURATED O2: 70 % (ref 95–100)
POC SATURATED O2: 99 % (ref 95–100)
POC SATURATED O2: 99 % (ref 95–100)
POC TCO2: 26 MMOL/L (ref 23–27)
POC TCO2: 27 MMOL/L (ref 23–27)
POC TCO2: 28 MMOL/L (ref 23–27)
POC TCO2: 28 MMOL/L (ref 24–29)
POC TCO2: 29 MMOL/L (ref 23–27)
POCT GLUCOSE: 132 MG/DL (ref 70–110)
POCT GLUCOSE: 133 MG/DL (ref 70–110)
POCT GLUCOSE: 136 MG/DL (ref 70–110)
POCT GLUCOSE: 142 MG/DL (ref 70–110)
POCT GLUCOSE: 148 MG/DL (ref 70–110)
POCT GLUCOSE: 149 MG/DL (ref 70–110)
POCT GLUCOSE: 150 MG/DL (ref 70–110)
POCT GLUCOSE: 154 MG/DL (ref 70–110)
POCT GLUCOSE: 155 MG/DL (ref 70–110)
POCT GLUCOSE: 158 MG/DL (ref 70–110)
POCT GLUCOSE: 160 MG/DL (ref 70–110)
POCT GLUCOSE: 162 MG/DL (ref 70–110)
POCT GLUCOSE: 177 MG/DL (ref 70–110)
POCT GLUCOSE: 192 MG/DL (ref 70–110)
POCT GLUCOSE: 194 MG/DL (ref 70–110)
POCT GLUCOSE: 213 MG/DL (ref 70–110)
POIKILOCYTOSIS BLD QL SMEAR: SLIGHT
POLYCHROMASIA BLD QL SMEAR: ABNORMAL
POTASSIUM BLD-SCNC: 3.8 MMOL/L (ref 3.5–5.1)
POTASSIUM BLD-SCNC: 3.9 MMOL/L (ref 3.5–5.1)
POTASSIUM BLD-SCNC: 3.9 MMOL/L (ref 3.5–5.1)
POTASSIUM BLD-SCNC: 4.3 MMOL/L (ref 3.5–5.1)
POTASSIUM SERPL-SCNC: 3.8 MMOL/L (ref 3.5–5.1)
POTASSIUM SERPL-SCNC: 4.1 MMOL/L (ref 3.5–5.1)
POTASSIUM SERPL-SCNC: 4.1 MMOL/L (ref 3.5–5.1)
POTASSIUM SERPL-SCNC: 4.2 MMOL/L (ref 3.5–5.1)
POTASSIUM SERPL-SCNC: 4.2 MMOL/L (ref 3.5–5.1)
POTASSIUM SERPL-SCNC: 4.3 MMOL/L (ref 3.5–5.1)
PROT SERPL-MCNC: 6.5 G/DL (ref 6–8.4)
PROT SERPL-MCNC: 6.5 G/DL (ref 6–8.4)
PROTHROMBIN TIME: 12.3 SEC (ref 9–12.5)
PROTHROMBIN TIME: 12.5 SEC (ref 9–12.5)
PROTHROMBIN TIME: 12.6 SEC (ref 9–12.5)
PROTHROMBIN TIME: 12.8 SEC (ref 9–12.5)
RBC # BLD AUTO: 2.61 M/UL (ref 4.6–6.2)
RBC # BLD AUTO: 2.62 M/UL (ref 4.6–6.2)
RBC # BLD AUTO: 2.66 M/UL (ref 4.6–6.2)
RBC # BLD AUTO: 2.68 M/UL (ref 4.6–6.2)
RBC # BLD AUTO: 2.73 M/UL (ref 4.6–6.2)
RBC # BLD AUTO: 2.73 M/UL (ref 4.6–6.2)
RBC # BLD AUTO: 2.85 M/UL (ref 4.6–6.2)
SAMPLE: ABNORMAL
SAMPLE: NORMAL
SITE: ABNORMAL
SITE: NORMAL
SODIUM BLD-SCNC: 139 MMOL/L (ref 136–145)
SODIUM BLD-SCNC: 140 MMOL/L (ref 136–145)
SODIUM SERPL-SCNC: 136 MMOL/L (ref 136–145)
SODIUM SERPL-SCNC: 137 MMOL/L (ref 136–145)
SODIUM SERPL-SCNC: 138 MMOL/L (ref 136–145)
SODIUM SERPL-SCNC: 139 MMOL/L (ref 136–145)
SP02: 100
SP02: 99
VT: 480
VT: 480
WBC # BLD AUTO: 11.83 K/UL (ref 3.9–12.7)
WBC # BLD AUTO: 12.64 K/UL (ref 3.9–12.7)
WBC # BLD AUTO: 12.98 K/UL (ref 3.9–12.7)
WBC # BLD AUTO: 12.98 K/UL (ref 3.9–12.7)
WBC # BLD AUTO: 13.11 K/UL (ref 3.9–12.7)
WBC # BLD AUTO: 13.22 K/UL (ref 3.9–12.7)
WBC # BLD AUTO: 13.93 K/UL (ref 3.9–12.7)

## 2021-01-15 PROCEDURE — 25000003 PHARM REV CODE 250: Performed by: STUDENT IN AN ORGANIZED HEALTH CARE EDUCATION/TRAINING PROGRAM

## 2021-01-15 PROCEDURE — C9113 INJ PANTOPRAZOLE SODIUM, VIA: HCPCS | Performed by: THORACIC SURGERY (CARDIOTHORACIC VASCULAR SURGERY)

## 2021-01-15 PROCEDURE — 37799 UNLISTED PX VASCULAR SURGERY: CPT

## 2021-01-15 PROCEDURE — 99233 SBSQ HOSP IP/OBS HIGH 50: CPT | Mod: ,,, | Performed by: INTERNAL MEDICINE

## 2021-01-15 PROCEDURE — 63600367 HC NITRIC OXIDE PER HOUR

## 2021-01-15 PROCEDURE — 85610 PROTHROMBIN TIME: CPT | Mod: 91

## 2021-01-15 PROCEDURE — 86140 C-REACTIVE PROTEIN: CPT

## 2021-01-15 PROCEDURE — 85730 THROMBOPLASTIN TIME PARTIAL: CPT

## 2021-01-15 PROCEDURE — 99900035 HC TECH TIME PER 15 MIN (STAT)

## 2021-01-15 PROCEDURE — 82803 BLOOD GASES ANY COMBINATION: CPT

## 2021-01-15 PROCEDURE — 97530 THERAPEUTIC ACTIVITIES: CPT

## 2021-01-15 PROCEDURE — 85730 THROMBOPLASTIN TIME PARTIAL: CPT | Mod: 91

## 2021-01-15 PROCEDURE — 27000221 HC OXYGEN, UP TO 24 HOURS

## 2021-01-15 PROCEDURE — 80076 HEPATIC FUNCTION PANEL: CPT

## 2021-01-15 PROCEDURE — A4216 STERILE WATER/SALINE, 10 ML: HCPCS | Performed by: THORACIC SURGERY (CARDIOTHORACIC VASCULAR SURGERY)

## 2021-01-15 PROCEDURE — 97165 OT EVAL LOW COMPLEX 30 MIN: CPT

## 2021-01-15 PROCEDURE — 84295 ASSAY OF SERUM SODIUM: CPT

## 2021-01-15 PROCEDURE — 25000003 PHARM REV CODE 250: Performed by: THORACIC SURGERY (CARDIOTHORACIC VASCULAR SURGERY)

## 2021-01-15 PROCEDURE — 85027 COMPLETE CBC AUTOMATED: CPT

## 2021-01-15 PROCEDURE — 63600175 PHARM REV CODE 636 W HCPCS: Performed by: STUDENT IN AN ORGANIZED HEALTH CARE EDUCATION/TRAINING PROGRAM

## 2021-01-15 PROCEDURE — C1751 CATH, INF, PER/CENT/MIDLINE: HCPCS

## 2021-01-15 PROCEDURE — 94150 VITAL CAPACITY TEST: CPT

## 2021-01-15 PROCEDURE — 82330 ASSAY OF CALCIUM: CPT

## 2021-01-15 PROCEDURE — 80048 BASIC METABOLIC PNL TOTAL CA: CPT | Mod: 91

## 2021-01-15 PROCEDURE — 99900026 HC AIRWAY MAINTENANCE (STAT)

## 2021-01-15 PROCEDURE — 85007 BL SMEAR W/DIFF WBC COUNT: CPT

## 2021-01-15 PROCEDURE — 97161 PT EVAL LOW COMPLEX 20 MIN: CPT

## 2021-01-15 PROCEDURE — 84100 ASSAY OF PHOSPHORUS: CPT | Mod: 91

## 2021-01-15 PROCEDURE — 93750 PR INTERROGATE VENT ASSIST DEV, IN PERSON, W PHYSICIAN ANALYSIS: ICD-10-PCS | Mod: ,,, | Performed by: INTERNAL MEDICINE

## 2021-01-15 PROCEDURE — 20000000 HC ICU ROOM

## 2021-01-15 PROCEDURE — 83605 ASSAY OF LACTIC ACID: CPT

## 2021-01-15 PROCEDURE — 93750 INTERROGATION VAD IN PERSON: CPT | Mod: ,,, | Performed by: INTERNAL MEDICINE

## 2021-01-15 PROCEDURE — 27000248 HC VAD-ADDITIONAL DAY

## 2021-01-15 PROCEDURE — 84132 ASSAY OF SERUM POTASSIUM: CPT

## 2021-01-15 PROCEDURE — 83735 ASSAY OF MAGNESIUM: CPT | Mod: 91

## 2021-01-15 PROCEDURE — 80048 BASIC METABOLIC PNL TOTAL CA: CPT

## 2021-01-15 PROCEDURE — 94761 N-INVAS EAR/PLS OXIMETRY MLT: CPT

## 2021-01-15 PROCEDURE — 85014 HEMATOCRIT: CPT

## 2021-01-15 PROCEDURE — 99232 SBSQ HOSP IP/OBS MODERATE 35: CPT | Mod: ,,, | Performed by: NURSE PRACTITIONER

## 2021-01-15 PROCEDURE — 94010 BREATHING CAPACITY TEST: CPT

## 2021-01-15 PROCEDURE — 83735 ASSAY OF MAGNESIUM: CPT

## 2021-01-15 PROCEDURE — 83615 LACTATE (LD) (LDH) ENZYME: CPT

## 2021-01-15 PROCEDURE — 99232 PR SUBSEQUENT HOSPITAL CARE,LEVL II: ICD-10-PCS | Mod: ,,, | Performed by: NURSE PRACTITIONER

## 2021-01-15 PROCEDURE — 36573 INSJ PICC RS&I 5 YR+: CPT

## 2021-01-15 PROCEDURE — 92610 EVALUATE SWALLOWING FUNCTION: CPT

## 2021-01-15 PROCEDURE — 83050 HGB METHEMOGLOBIN QUAN: CPT

## 2021-01-15 PROCEDURE — 83880 ASSAY OF NATRIURETIC PEPTIDE: CPT

## 2021-01-15 PROCEDURE — 85025 COMPLETE CBC W/AUTO DIFF WBC: CPT | Mod: 91

## 2021-01-15 PROCEDURE — 97116 GAIT TRAINING THERAPY: CPT

## 2021-01-15 PROCEDURE — 63600175 PHARM REV CODE 636 W HCPCS: Performed by: THORACIC SURGERY (CARDIOTHORACIC VASCULAR SURGERY)

## 2021-01-15 PROCEDURE — 85610 PROTHROMBIN TIME: CPT

## 2021-01-15 PROCEDURE — 76937 US GUIDE VASCULAR ACCESS: CPT

## 2021-01-15 PROCEDURE — 99233 PR SUBSEQUENT HOSPITAL CARE,LEVL III: ICD-10-PCS | Mod: ,,, | Performed by: INTERNAL MEDICINE

## 2021-01-15 PROCEDURE — 84100 ASSAY OF PHOSPHORUS: CPT

## 2021-01-15 PROCEDURE — 25000003 PHARM REV CODE 250: Performed by: HOSPITALIST

## 2021-01-15 RX ORDER — WARFARIN 2 MG/1
2 TABLET ORAL ONCE
Status: COMPLETED | OUTPATIENT
Start: 2021-01-15 | End: 2021-01-15

## 2021-01-15 RX ORDER — SODIUM CHLORIDE 0.9 % (FLUSH) 0.9 %
10 SYRINGE (ML) INJECTION EVERY 6 HOURS
Status: DISCONTINUED | OUTPATIENT
Start: 2021-01-15 | End: 2021-02-04 | Stop reason: HOSPADM

## 2021-01-15 RX ORDER — POTASSIUM CHLORIDE 29.8 MG/ML
40 INJECTION INTRAVENOUS ONCE
Status: COMPLETED | OUTPATIENT
Start: 2021-01-15 | End: 2021-01-15

## 2021-01-15 RX ORDER — WARFARIN 2 MG/1
2 TABLET ORAL ONCE
Status: DISCONTINUED | OUTPATIENT
Start: 2021-01-15 | End: 2021-01-15

## 2021-01-15 RX ORDER — SODIUM CHLORIDE 0.9 % (FLUSH) 0.9 %
10 SYRINGE (ML) INJECTION
Status: DISCONTINUED | OUTPATIENT
Start: 2021-01-15 | End: 2021-02-04 | Stop reason: HOSPADM

## 2021-01-15 RX ORDER — HEPARIN SODIUM 10000 [USP'U]/100ML
1200 INJECTION, SOLUTION INTRAVENOUS CONTINUOUS
Status: DISCONTINUED | OUTPATIENT
Start: 2021-01-15 | End: 2021-01-19

## 2021-01-15 RX ADMIN — CIPROFLOXACIN 400 MG: 2 INJECTION, SOLUTION INTRAVENOUS at 08:01

## 2021-01-15 RX ADMIN — WARFARIN SODIUM 2 MG: 2 TABLET ORAL at 05:01

## 2021-01-15 RX ADMIN — POTASSIUM CHLORIDE 40 MEQ: 29.8 INJECTION, SOLUTION INTRAVENOUS at 09:01

## 2021-01-15 RX ADMIN — DOCUSATE SODIUM 200 MG: 50 CAPSULE, LIQUID FILLED ORAL at 08:01

## 2021-01-15 RX ADMIN — MUPIROCIN: 20 OINTMENT TOPICAL at 08:01

## 2021-01-15 RX ADMIN — ASPIRIN 325 MG ORAL TABLET 325 MG: 325 PILL ORAL at 08:01

## 2021-01-15 RX ADMIN — ATORVASTATIN CALCIUM 80 MG: 20 TABLET, FILM COATED ORAL at 08:01

## 2021-01-15 RX ADMIN — POLYETHYLENE GLYCOL 3350 17 G: 17 POWDER, FOR SOLUTION ORAL at 08:01

## 2021-01-15 RX ADMIN — OXYCODONE HYDROCHLORIDE 10 MG: 10 TABLET ORAL at 07:01

## 2021-01-15 RX ADMIN — INSULIN ASPART 1 UNITS: 100 INJECTION, SOLUTION INTRAVENOUS; SUBCUTANEOUS at 08:01

## 2021-01-15 RX ADMIN — SODIUM PHOSPHATE, MONOBASIC, MONOHYDRATE 30 MMOL: 276; 142 INJECTION, SOLUTION INTRAVENOUS at 06:01

## 2021-01-15 RX ADMIN — Medication 10 ML: at 05:01

## 2021-01-15 RX ADMIN — Medication 10 ML: at 11:01

## 2021-01-15 RX ADMIN — PANTOPRAZOLE SODIUM 40 MG: 40 INJECTION, POWDER, FOR SOLUTION INTRAVENOUS at 08:01

## 2021-01-15 RX ADMIN — MAGNESIUM SULFATE 2 G: 2 INJECTION INTRAVENOUS at 02:01

## 2021-01-15 RX ADMIN — VANCOMYCIN HYDROCHLORIDE 1250 MG: 10 INJECTION, POWDER, LYOPHILIZED, FOR SOLUTION INTRAVENOUS at 09:01

## 2021-01-15 RX ADMIN — EPINEPHRINE 0.02 MCG/KG/MIN: 1 INJECTION INTRAMUSCULAR; INTRAVENOUS; SUBCUTANEOUS at 11:01

## 2021-01-15 RX ADMIN — SODIUM CHLORIDE 0.5 UNITS/HR: 9 INJECTION, SOLUTION INTRAVENOUS at 06:01

## 2021-01-15 RX ADMIN — HEPARIN SODIUM AND DEXTROSE 200 UNITS/HR: 10000; 5 INJECTION INTRAVENOUS at 01:01

## 2021-01-16 LAB
ALLENS TEST: ABNORMAL
ANION GAP SERPL CALC-SCNC: 10 MMOL/L (ref 8–16)
ANION GAP SERPL CALC-SCNC: 11 MMOL/L (ref 8–16)
ANION GAP SERPL CALC-SCNC: 7 MMOL/L (ref 8–16)
APTT BLDCRRT: 30.7 SEC (ref 21–32)
APTT BLDCRRT: 33.8 SEC (ref 21–32)
APTT BLDCRRT: 35.8 SEC (ref 21–32)
BASOPHILS # BLD AUTO: 0.01 K/UL (ref 0–0.2)
BASOPHILS # BLD AUTO: 0.02 K/UL (ref 0–0.2)
BASOPHILS # BLD AUTO: 0.02 K/UL (ref 0–0.2)
BASOPHILS NFR BLD: 0.1 % (ref 0–1.9)
BASOPHILS NFR BLD: 0.2 % (ref 0–1.9)
BASOPHILS NFR BLD: 0.2 % (ref 0–1.9)
BLD PROD TYP BPU: NORMAL
BLOOD UNIT EXPIRATION DATE: NORMAL
BLOOD UNIT TYPE CODE: 5100
BLOOD UNIT TYPE: NORMAL
BUN SERPL-MCNC: 26 MG/DL (ref 8–23)
BUN SERPL-MCNC: 26 MG/DL (ref 8–23)
BUN SERPL-MCNC: 29 MG/DL (ref 8–23)
CALCIUM SERPL-MCNC: 8.2 MG/DL (ref 8.7–10.5)
CALCIUM SERPL-MCNC: 8.7 MG/DL (ref 8.7–10.5)
CALCIUM SERPL-MCNC: 8.8 MG/DL (ref 8.7–10.5)
CHLORIDE SERPL-SCNC: 101 MMOL/L (ref 95–110)
CHLORIDE SERPL-SCNC: 101 MMOL/L (ref 95–110)
CHLORIDE SERPL-SCNC: 102 MMOL/L (ref 95–110)
CO2 SERPL-SCNC: 23 MMOL/L (ref 23–29)
CO2 SERPL-SCNC: 26 MMOL/L (ref 23–29)
CO2 SERPL-SCNC: 26 MMOL/L (ref 23–29)
CODING SYSTEM: NORMAL
CREAT SERPL-MCNC: 1.1 MG/DL (ref 0.5–1.4)
DELSYS: ABNORMAL
DIFFERENTIAL METHOD: ABNORMAL
DISPENSE STATUS: NORMAL
EOSINOPHIL # BLD AUTO: 0.1 K/UL (ref 0–0.5)
EOSINOPHIL NFR BLD: 0.5 % (ref 0–8)
ERYTHROCYTE [DISTWIDTH] IN BLOOD BY AUTOMATED COUNT: 13.6 % (ref 11.5–14.5)
ERYTHROCYTE [DISTWIDTH] IN BLOOD BY AUTOMATED COUNT: 13.8 % (ref 11.5–14.5)
ERYTHROCYTE [DISTWIDTH] IN BLOOD BY AUTOMATED COUNT: 13.8 % (ref 11.5–14.5)
EST. GFR  (AFRICAN AMERICAN): >60 ML/MIN/1.73 M^2
EST. GFR  (NON AFRICAN AMERICAN): >60 ML/MIN/1.73 M^2
GLUCOSE SERPL-MCNC: 150 MG/DL (ref 70–110)
GLUCOSE SERPL-MCNC: 157 MG/DL (ref 70–110)
GLUCOSE SERPL-MCNC: 158 MG/DL (ref 70–110)
HCO3 UR-SCNC: 27.2 MMOL/L (ref 24–28)
HCT VFR BLD AUTO: 23.4 % (ref 40–54)
HCT VFR BLD AUTO: 25.3 % (ref 40–54)
HCT VFR BLD AUTO: 25.8 % (ref 40–54)
HGB BLD-MCNC: 7.6 G/DL (ref 14–18)
HGB BLD-MCNC: 7.8 G/DL (ref 14–18)
HGB BLD-MCNC: 8.3 G/DL (ref 14–18)
IMM GRANULOCYTES # BLD AUTO: 0.06 K/UL (ref 0–0.04)
IMM GRANULOCYTES # BLD AUTO: 0.06 K/UL (ref 0–0.04)
IMM GRANULOCYTES # BLD AUTO: 0.07 K/UL (ref 0–0.04)
IMM GRANULOCYTES NFR BLD AUTO: 0.5 % (ref 0–0.5)
INR PPP: 1.1 (ref 0.8–1.2)
INR PPP: 1.1 (ref 0.8–1.2)
INR PPP: 1.2 (ref 0.8–1.2)
LDH SERPL L TO P-CCNC: 350 U/L (ref 110–260)
LYMPHOCYTES # BLD AUTO: 1.2 K/UL (ref 1–4.8)
LYMPHOCYTES # BLD AUTO: 1.2 K/UL (ref 1–4.8)
LYMPHOCYTES # BLD AUTO: 1.3 K/UL (ref 1–4.8)
LYMPHOCYTES NFR BLD: 10.2 % (ref 18–48)
LYMPHOCYTES NFR BLD: 10.3 % (ref 18–48)
LYMPHOCYTES NFR BLD: 9.8 % (ref 18–48)
MAGNESIUM SERPL-MCNC: 1.9 MG/DL (ref 1.6–2.6)
MAGNESIUM SERPL-MCNC: 2.1 MG/DL (ref 1.6–2.6)
MAGNESIUM SERPL-MCNC: 2.2 MG/DL (ref 1.6–2.6)
MCH RBC QN AUTO: 29.9 PG (ref 27–31)
MCH RBC QN AUTO: 30.4 PG (ref 27–31)
MCH RBC QN AUTO: 30.6 PG (ref 27–31)
MCHC RBC AUTO-ENTMCNC: 30.8 G/DL (ref 32–36)
MCHC RBC AUTO-ENTMCNC: 32.2 G/DL (ref 32–36)
MCHC RBC AUTO-ENTMCNC: 32.5 G/DL (ref 32–36)
MCV RBC AUTO: 94 FL (ref 82–98)
MCV RBC AUTO: 95 FL (ref 82–98)
MCV RBC AUTO: 97 FL (ref 82–98)
METHEMOGLOBIN: 0.8 % (ref 0–3)
MONOCYTES # BLD AUTO: 1.5 K/UL (ref 0.3–1)
MONOCYTES # BLD AUTO: 1.5 K/UL (ref 0.3–1)
MONOCYTES # BLD AUTO: 1.6 K/UL (ref 0.3–1)
MONOCYTES NFR BLD: 11.5 % (ref 4–15)
MONOCYTES NFR BLD: 12.5 % (ref 4–15)
MONOCYTES NFR BLD: 13.5 % (ref 4–15)
NEUTROPHILS # BLD AUTO: 8.7 K/UL (ref 1.8–7.7)
NEUTROPHILS # BLD AUTO: 9.3 K/UL (ref 1.8–7.7)
NEUTROPHILS # BLD AUTO: 9.9 K/UL (ref 1.8–7.7)
NEUTROPHILS NFR BLD: 75.1 % (ref 38–73)
NEUTROPHILS NFR BLD: 76.6 % (ref 38–73)
NEUTROPHILS NFR BLD: 77 % (ref 38–73)
NRBC BLD-RTO: 0 /100 WBC
NUM UNITS TRANS PACKED RBC: NORMAL
PCO2 BLDA: 45.1 MMHG (ref 35–45)
PH SMN: 7.39 [PH] (ref 7.35–7.45)
PHOSPHATE SERPL-MCNC: 2.3 MG/DL (ref 2.7–4.5)
PHOSPHATE SERPL-MCNC: 2.4 MG/DL (ref 2.7–4.5)
PHOSPHATE SERPL-MCNC: 2.4 MG/DL (ref 2.7–4.5)
PLATELET # BLD AUTO: 105 K/UL (ref 150–350)
PLATELET # BLD AUTO: 91 K/UL (ref 150–350)
PLATELET # BLD AUTO: 92 K/UL (ref 150–350)
PMV BLD AUTO: 11.5 FL (ref 9.2–12.9)
PMV BLD AUTO: 11.7 FL (ref 9.2–12.9)
PMV BLD AUTO: 12.1 FL (ref 9.2–12.9)
PO2 BLDA: 34 MMHG (ref 40–60)
POC BE: 2 MMOL/L
POC SATURATED O2: 64 % (ref 95–100)
POC TCO2: 29 MMOL/L (ref 24–29)
POCT GLUCOSE: 130 MG/DL (ref 70–110)
POCT GLUCOSE: 180 MG/DL (ref 70–110)
POCT GLUCOSE: 208 MG/DL (ref 70–110)
POCT GLUCOSE: 212 MG/DL (ref 70–110)
POCT GLUCOSE: 222 MG/DL (ref 70–110)
POTASSIUM SERPL-SCNC: 3.9 MMOL/L (ref 3.5–5.1)
POTASSIUM SERPL-SCNC: 4.1 MMOL/L (ref 3.5–5.1)
POTASSIUM SERPL-SCNC: 4.1 MMOL/L (ref 3.5–5.1)
PROTHROMBIN TIME: 11.9 SEC (ref 9–12.5)
PROTHROMBIN TIME: 11.9 SEC (ref 9–12.5)
PROTHROMBIN TIME: 12.8 SEC (ref 9–12.5)
RBC # BLD AUTO: 2.5 M/UL (ref 4.6–6.2)
RBC # BLD AUTO: 2.61 M/UL (ref 4.6–6.2)
RBC # BLD AUTO: 2.71 M/UL (ref 4.6–6.2)
SAMPLE: ABNORMAL
SITE: ABNORMAL
SODIUM SERPL-SCNC: 134 MMOL/L (ref 136–145)
SODIUM SERPL-SCNC: 136 MMOL/L (ref 136–145)
SODIUM SERPL-SCNC: 137 MMOL/L (ref 136–145)
WBC # BLD AUTO: 11.59 K/UL (ref 3.9–12.7)
WBC # BLD AUTO: 12.1 K/UL (ref 3.9–12.7)
WBC # BLD AUTO: 12.81 K/UL (ref 3.9–12.7)

## 2021-01-16 PROCEDURE — C9113 INJ PANTOPRAZOLE SODIUM, VIA: HCPCS | Performed by: THORACIC SURGERY (CARDIOTHORACIC VASCULAR SURGERY)

## 2021-01-16 PROCEDURE — 85610 PROTHROMBIN TIME: CPT | Mod: 91

## 2021-01-16 PROCEDURE — 83735 ASSAY OF MAGNESIUM: CPT

## 2021-01-16 PROCEDURE — 94761 N-INVAS EAR/PLS OXIMETRY MLT: CPT

## 2021-01-16 PROCEDURE — 83615 LACTATE (LD) (LDH) ENZYME: CPT

## 2021-01-16 PROCEDURE — 80048 BASIC METABOLIC PNL TOTAL CA: CPT | Mod: 91

## 2021-01-16 PROCEDURE — 97530 THERAPEUTIC ACTIVITIES: CPT

## 2021-01-16 PROCEDURE — 84100 ASSAY OF PHOSPHORUS: CPT | Mod: 91

## 2021-01-16 PROCEDURE — A4216 STERILE WATER/SALINE, 10 ML: HCPCS | Performed by: THORACIC SURGERY (CARDIOTHORACIC VASCULAR SURGERY)

## 2021-01-16 PROCEDURE — 20000000 HC ICU ROOM

## 2021-01-16 PROCEDURE — 63600367 HC NITRIC OXIDE PER HOUR

## 2021-01-16 PROCEDURE — 25000003 PHARM REV CODE 250: Performed by: HOSPITALIST

## 2021-01-16 PROCEDURE — 99233 SBSQ HOSP IP/OBS HIGH 50: CPT | Mod: ,,, | Performed by: INTERNAL MEDICINE

## 2021-01-16 PROCEDURE — 63600150 PHARM REV CODE 636: Performed by: THORACIC SURGERY (CARDIOTHORACIC VASCULAR SURGERY)

## 2021-01-16 PROCEDURE — 85025 COMPLETE CBC W/AUTO DIFF WBC: CPT

## 2021-01-16 PROCEDURE — 27000221 HC OXYGEN, UP TO 24 HOURS

## 2021-01-16 PROCEDURE — 80048 BASIC METABOLIC PNL TOTAL CA: CPT

## 2021-01-16 PROCEDURE — 84100 ASSAY OF PHOSPHORUS: CPT

## 2021-01-16 PROCEDURE — 85730 THROMBOPLASTIN TIME PARTIAL: CPT | Mod: 91

## 2021-01-16 PROCEDURE — 99900035 HC TECH TIME PER 15 MIN (STAT)

## 2021-01-16 PROCEDURE — 85730 THROMBOPLASTIN TIME PARTIAL: CPT

## 2021-01-16 PROCEDURE — 63600175 PHARM REV CODE 636 W HCPCS: Performed by: THORACIC SURGERY (CARDIOTHORACIC VASCULAR SURGERY)

## 2021-01-16 PROCEDURE — 93750 PR INTERROGATE VENT ASSIST DEV, IN PERSON, W PHYSICIAN ANALYSIS: ICD-10-PCS | Mod: ,,, | Performed by: INTERNAL MEDICINE

## 2021-01-16 PROCEDURE — 25000003 PHARM REV CODE 250: Performed by: STUDENT IN AN ORGANIZED HEALTH CARE EDUCATION/TRAINING PROGRAM

## 2021-01-16 PROCEDURE — 99233 PR SUBSEQUENT HOSPITAL CARE,LEVL III: ICD-10-PCS | Mod: ,,, | Performed by: INTERNAL MEDICINE

## 2021-01-16 PROCEDURE — 83735 ASSAY OF MAGNESIUM: CPT | Mod: 91

## 2021-01-16 PROCEDURE — 82803 BLOOD GASES ANY COMBINATION: CPT

## 2021-01-16 PROCEDURE — 83050 HGB METHEMOGLOBIN QUAN: CPT

## 2021-01-16 PROCEDURE — 63600175 PHARM REV CODE 636 W HCPCS: Performed by: HOSPITALIST

## 2021-01-16 PROCEDURE — 25000003 PHARM REV CODE 250: Performed by: THORACIC SURGERY (CARDIOTHORACIC VASCULAR SURGERY)

## 2021-01-16 PROCEDURE — 63600175 PHARM REV CODE 636 W HCPCS: Performed by: STUDENT IN AN ORGANIZED HEALTH CARE EDUCATION/TRAINING PROGRAM

## 2021-01-16 PROCEDURE — 85610 PROTHROMBIN TIME: CPT

## 2021-01-16 PROCEDURE — 93750 INTERROGATION VAD IN PERSON: CPT | Mod: ,,, | Performed by: INTERNAL MEDICINE

## 2021-01-16 RX ORDER — POLYETHYLENE GLYCOL 3350 17 G/17G
17 POWDER, FOR SOLUTION ORAL DAILY
Status: DISCONTINUED | OUTPATIENT
Start: 2021-01-16 | End: 2021-02-04 | Stop reason: HOSPADM

## 2021-01-16 RX ADMIN — FUROSEMIDE 7.5 MG/HR: 10 INJECTION, SOLUTION INTRAMUSCULAR; INTRAVENOUS at 12:01

## 2021-01-16 RX ADMIN — EPINEPHRINE 0.02 MCG/KG/MIN: 1 INJECTION INTRAMUSCULAR; INTRAVENOUS; SUBCUTANEOUS at 12:01

## 2021-01-16 RX ADMIN — MAGNESIUM SULFATE 2 G: 2 INJECTION INTRAVENOUS at 01:01

## 2021-01-16 RX ADMIN — ATORVASTATIN CALCIUM 80 MG: 20 TABLET, FILM COATED ORAL at 08:01

## 2021-01-16 RX ADMIN — INSULIN ASPART 4 UNITS: 100 INJECTION, SOLUTION INTRAVENOUS; SUBCUTANEOUS at 05:01

## 2021-01-16 RX ADMIN — INSULIN ASPART 2 UNITS: 100 INJECTION, SOLUTION INTRAVENOUS; SUBCUTANEOUS at 09:01

## 2021-01-16 RX ADMIN — OXYCODONE HYDROCHLORIDE 10 MG: 10 TABLET ORAL at 01:01

## 2021-01-16 RX ADMIN — PANTOPRAZOLE SODIUM 40 MG: 40 INJECTION, POWDER, FOR SOLUTION INTRAVENOUS at 08:01

## 2021-01-16 RX ADMIN — Medication 10 ML: at 06:01

## 2021-01-16 RX ADMIN — SODIUM PHOSPHATE, MONOBASIC, MONOHYDRATE 30 MMOL: 276; 142 INJECTION, SOLUTION INTRAVENOUS at 11:01

## 2021-01-16 RX ADMIN — CIPROFLOXACIN 400 MG: 2 INJECTION, SOLUTION INTRAVENOUS at 09:01

## 2021-01-16 RX ADMIN — Medication 10 ML: at 12:01

## 2021-01-16 RX ADMIN — OXYCODONE HYDROCHLORIDE 10 MG: 10 TABLET ORAL at 08:01

## 2021-01-16 RX ADMIN — ASPIRIN 325 MG ORAL TABLET 325 MG: 325 PILL ORAL at 08:01

## 2021-01-16 RX ADMIN — INSULIN ASPART 4 UNITS: 100 INJECTION, SOLUTION INTRAVENOUS; SUBCUTANEOUS at 12:01

## 2021-01-16 RX ADMIN — MUPIROCIN: 20 OINTMENT TOPICAL at 09:01

## 2021-01-16 RX ADMIN — MUPIROCIN: 20 OINTMENT TOPICAL at 08:01

## 2021-01-16 RX ADMIN — DOCUSATE SODIUM 200 MG: 50 CAPSULE, LIQUID FILLED ORAL at 08:01

## 2021-01-16 RX ADMIN — POLYETHYLENE GLYCOL 3350 17 G: 17 POWDER, FOR SOLUTION ORAL at 08:01

## 2021-01-16 RX ADMIN — CIPROFLOXACIN 400 MG: 2 INJECTION, SOLUTION INTRAVENOUS at 08:01

## 2021-01-16 RX ADMIN — VANCOMYCIN HYDROCHLORIDE 1250 MG: 10 INJECTION, POWDER, LYOPHILIZED, FOR SOLUTION INTRAVENOUS at 09:01

## 2021-01-17 LAB
ALLENS TEST: ABNORMAL
ANION GAP SERPL CALC-SCNC: 10 MMOL/L (ref 8–16)
ANION GAP SERPL CALC-SCNC: 11 MMOL/L (ref 8–16)
APTT BLDCRRT: 31.3 SEC (ref 21–32)
APTT BLDCRRT: 33 SEC (ref 21–32)
APTT BLDCRRT: 33.5 SEC (ref 21–32)
BASOPHILS # BLD AUTO: 0.02 K/UL (ref 0–0.2)
BASOPHILS NFR BLD: 0.2 % (ref 0–1.9)
BUN SERPL-MCNC: 27 MG/DL (ref 8–23)
BUN SERPL-MCNC: 30 MG/DL (ref 8–23)
CALCIUM SERPL-MCNC: 8.2 MG/DL (ref 8.7–10.5)
CALCIUM SERPL-MCNC: 8.4 MG/DL (ref 8.7–10.5)
CHLORIDE SERPL-SCNC: 100 MMOL/L (ref 95–110)
CHLORIDE SERPL-SCNC: 99 MMOL/L (ref 95–110)
CO2 SERPL-SCNC: 26 MMOL/L (ref 23–29)
CO2 SERPL-SCNC: 28 MMOL/L (ref 23–29)
CREAT SERPL-MCNC: 1 MG/DL (ref 0.5–1.4)
CREAT SERPL-MCNC: 1.1 MG/DL (ref 0.5–1.4)
DELSYS: ABNORMAL
DIFFERENTIAL METHOD: ABNORMAL
EOSINOPHIL # BLD AUTO: 0.2 K/UL (ref 0–0.5)
EOSINOPHIL NFR BLD: 1.7 % (ref 0–8)
ERYTHROCYTE [DISTWIDTH] IN BLOOD BY AUTOMATED COUNT: 13.7 % (ref 11.5–14.5)
EST. GFR  (AFRICAN AMERICAN): >60 ML/MIN/1.73 M^2
EST. GFR  (AFRICAN AMERICAN): >60 ML/MIN/1.73 M^2
EST. GFR  (NON AFRICAN AMERICAN): >60 ML/MIN/1.73 M^2
EST. GFR  (NON AFRICAN AMERICAN): >60 ML/MIN/1.73 M^2
GLUCOSE SERPL-MCNC: 164 MG/DL (ref 70–110)
GLUCOSE SERPL-MCNC: 175 MG/DL (ref 70–110)
HCO3 UR-SCNC: 29.1 MMOL/L (ref 24–28)
HCT VFR BLD AUTO: 22.8 % (ref 40–54)
HGB BLD-MCNC: 7.4 G/DL (ref 14–18)
IMM GRANULOCYTES # BLD AUTO: 0.05 K/UL (ref 0–0.04)
IMM GRANULOCYTES NFR BLD AUTO: 0.5 % (ref 0–0.5)
INR PPP: 1 (ref 0.8–1.2)
LDH SERPL L TO P-CCNC: 244 U/L (ref 110–260)
LYMPHOCYTES # BLD AUTO: 1.4 K/UL (ref 1–4.8)
LYMPHOCYTES NFR BLD: 14.5 % (ref 18–48)
MAGNESIUM SERPL-MCNC: 2 MG/DL (ref 1.6–2.6)
MAGNESIUM SERPL-MCNC: 2 MG/DL (ref 1.6–2.6)
MCH RBC QN AUTO: 30.7 PG (ref 27–31)
MCHC RBC AUTO-ENTMCNC: 32.5 G/DL (ref 32–36)
MCV RBC AUTO: 95 FL (ref 82–98)
METHEMOGLOBIN: 0.8 % (ref 0–3)
MONOCYTES # BLD AUTO: 1.4 K/UL (ref 0.3–1)
MONOCYTES NFR BLD: 14.3 % (ref 4–15)
NEUTROPHILS # BLD AUTO: 6.7 K/UL (ref 1.8–7.7)
NEUTROPHILS NFR BLD: 68.8 % (ref 38–73)
NRBC BLD-RTO: 0 /100 WBC
PCO2 BLDA: 45.8 MMHG (ref 35–45)
PH SMN: 7.41 [PH] (ref 7.35–7.45)
PHOSPHATE SERPL-MCNC: 2.3 MG/DL (ref 2.7–4.5)
PLATELET # BLD AUTO: 123 K/UL (ref 150–350)
PMV BLD AUTO: 11.6 FL (ref 9.2–12.9)
PO2 BLDA: 29 MMHG (ref 40–60)
POC BE: 5 MMOL/L
POC SATURATED O2: 54 % (ref 95–100)
POC TCO2: 31 MMOL/L (ref 24–29)
POCT GLUCOSE: 160 MG/DL (ref 70–110)
POCT GLUCOSE: 166 MG/DL (ref 70–110)
POCT GLUCOSE: 166 MG/DL (ref 70–110)
POCT GLUCOSE: 197 MG/DL (ref 70–110)
POTASSIUM SERPL-SCNC: 3.4 MMOL/L (ref 3.5–5.1)
POTASSIUM SERPL-SCNC: 3.6 MMOL/L (ref 3.5–5.1)
PROTHROMBIN TIME: 11.4 SEC (ref 9–12.5)
RBC # BLD AUTO: 2.41 M/UL (ref 4.6–6.2)
SAMPLE: ABNORMAL
SITE: ABNORMAL
SODIUM SERPL-SCNC: 136 MMOL/L (ref 136–145)
SODIUM SERPL-SCNC: 138 MMOL/L (ref 136–145)
WBC # BLD AUTO: 9.75 K/UL (ref 3.9–12.7)

## 2021-01-17 PROCEDURE — 63600367 HC NITRIC OXIDE PER HOUR

## 2021-01-17 PROCEDURE — 25000003 PHARM REV CODE 250: Performed by: HOSPITALIST

## 2021-01-17 PROCEDURE — 99233 SBSQ HOSP IP/OBS HIGH 50: CPT | Mod: ,,, | Performed by: INTERNAL MEDICINE

## 2021-01-17 PROCEDURE — 85730 THROMBOPLASTIN TIME PARTIAL: CPT | Mod: 91

## 2021-01-17 PROCEDURE — 85610 PROTHROMBIN TIME: CPT

## 2021-01-17 PROCEDURE — 27000248 HC VAD-ADDITIONAL DAY

## 2021-01-17 PROCEDURE — 85730 THROMBOPLASTIN TIME PARTIAL: CPT

## 2021-01-17 PROCEDURE — 25000003 PHARM REV CODE 250: Performed by: STUDENT IN AN ORGANIZED HEALTH CARE EDUCATION/TRAINING PROGRAM

## 2021-01-17 PROCEDURE — 83735 ASSAY OF MAGNESIUM: CPT

## 2021-01-17 PROCEDURE — 63600175 PHARM REV CODE 636 W HCPCS: Performed by: THORACIC SURGERY (CARDIOTHORACIC VASCULAR SURGERY)

## 2021-01-17 PROCEDURE — 85025 COMPLETE CBC W/AUTO DIFF WBC: CPT

## 2021-01-17 PROCEDURE — 93750 INTERROGATION VAD IN PERSON: CPT | Mod: ,,, | Performed by: INTERNAL MEDICINE

## 2021-01-17 PROCEDURE — 25000003 PHARM REV CODE 250: Performed by: THORACIC SURGERY (CARDIOTHORACIC VASCULAR SURGERY)

## 2021-01-17 PROCEDURE — 93010 ELECTROCARDIOGRAM REPORT: CPT | Mod: ,,, | Performed by: INTERNAL MEDICINE

## 2021-01-17 PROCEDURE — 80048 BASIC METABOLIC PNL TOTAL CA: CPT | Mod: 91

## 2021-01-17 PROCEDURE — 93005 ELECTROCARDIOGRAM TRACING: CPT

## 2021-01-17 PROCEDURE — A4216 STERILE WATER/SALINE, 10 ML: HCPCS | Performed by: THORACIC SURGERY (CARDIOTHORACIC VASCULAR SURGERY)

## 2021-01-17 PROCEDURE — 83735 ASSAY OF MAGNESIUM: CPT | Mod: 91

## 2021-01-17 PROCEDURE — 20000000 HC ICU ROOM

## 2021-01-17 PROCEDURE — 99233 PR SUBSEQUENT HOSPITAL CARE,LEVL III: ICD-10-PCS | Mod: ,,, | Performed by: INTERNAL MEDICINE

## 2021-01-17 PROCEDURE — C9113 INJ PANTOPRAZOLE SODIUM, VIA: HCPCS | Performed by: THORACIC SURGERY (CARDIOTHORACIC VASCULAR SURGERY)

## 2021-01-17 PROCEDURE — 80048 BASIC METABOLIC PNL TOTAL CA: CPT

## 2021-01-17 PROCEDURE — 63600175 PHARM REV CODE 636 W HCPCS: Performed by: STUDENT IN AN ORGANIZED HEALTH CARE EDUCATION/TRAINING PROGRAM

## 2021-01-17 PROCEDURE — 93750 PR INTERROGATE VENT ASSIST DEV, IN PERSON, W PHYSICIAN ANALYSIS: ICD-10-PCS | Mod: ,,, | Performed by: INTERNAL MEDICINE

## 2021-01-17 PROCEDURE — 93010 EKG 12-LEAD: ICD-10-PCS | Mod: ,,, | Performed by: INTERNAL MEDICINE

## 2021-01-17 PROCEDURE — 27000221 HC OXYGEN, UP TO 24 HOURS

## 2021-01-17 PROCEDURE — 83615 LACTATE (LD) (LDH) ENZYME: CPT

## 2021-01-17 PROCEDURE — 63600175 PHARM REV CODE 636 W HCPCS: Performed by: NURSE PRACTITIONER

## 2021-01-17 PROCEDURE — 99900035 HC TECH TIME PER 15 MIN (STAT)

## 2021-01-17 PROCEDURE — 82803 BLOOD GASES ANY COMBINATION: CPT

## 2021-01-17 PROCEDURE — 97530 THERAPEUTIC ACTIVITIES: CPT | Mod: CQ

## 2021-01-17 PROCEDURE — 94761 N-INVAS EAR/PLS OXIMETRY MLT: CPT

## 2021-01-17 PROCEDURE — 84100 ASSAY OF PHOSPHORUS: CPT

## 2021-01-17 RX ORDER — WARFARIN 4 MG/1
4 TABLET ORAL ONCE
Status: DISCONTINUED | OUTPATIENT
Start: 2021-01-17 | End: 2021-01-17

## 2021-01-17 RX ORDER — SODIUM,POTASSIUM PHOSPHATES 280-250MG
2 POWDER IN PACKET (EA) ORAL ONCE
Status: COMPLETED | OUTPATIENT
Start: 2021-01-17 | End: 2021-01-17

## 2021-01-17 RX ORDER — INSULIN ASPART 100 [IU]/ML
4 INJECTION, SOLUTION INTRAVENOUS; SUBCUTANEOUS
Status: DISCONTINUED | OUTPATIENT
Start: 2021-01-17 | End: 2021-01-30

## 2021-01-17 RX ORDER — HYDRALAZINE HYDROCHLORIDE 25 MG/1
25 TABLET, FILM COATED ORAL EVERY 8 HOURS
Status: DISCONTINUED | OUTPATIENT
Start: 2021-01-17 | End: 2021-01-17

## 2021-01-17 RX ORDER — WARFARIN 4 MG/1
4 TABLET ORAL ONCE
Status: COMPLETED | OUTPATIENT
Start: 2021-01-17 | End: 2021-01-17

## 2021-01-17 RX ORDER — HYDRALAZINE HYDROCHLORIDE 25 MG/1
25 TABLET, FILM COATED ORAL ONCE
Status: COMPLETED | OUTPATIENT
Start: 2021-01-17 | End: 2021-01-17

## 2021-01-17 RX ORDER — SYRING-NEEDL,DISP,INSUL,0.3 ML 29 G X1/2"
148 SYRINGE, EMPTY DISPOSABLE MISCELLANEOUS ONCE
Status: COMPLETED | OUTPATIENT
Start: 2021-01-17 | End: 2021-01-17

## 2021-01-17 RX ORDER — WARFARIN 2 MG/1
2 TABLET ORAL ONCE
Status: DISCONTINUED | OUTPATIENT
Start: 2021-01-17 | End: 2021-01-17

## 2021-01-17 RX ADMIN — ASPIRIN 325 MG ORAL TABLET 325 MG: 325 PILL ORAL at 08:01

## 2021-01-17 RX ADMIN — CIPROFLOXACIN 400 MG: 2 INJECTION, SOLUTION INTRAVENOUS at 08:01

## 2021-01-17 RX ADMIN — INSULIN ASPART 4 UNITS: 100 INJECTION, SOLUTION INTRAVENOUS; SUBCUTANEOUS at 05:01

## 2021-01-17 RX ADMIN — Medication 10 ML: at 05:01

## 2021-01-17 RX ADMIN — VANCOMYCIN HYDROCHLORIDE 1250 MG: 10 INJECTION, POWDER, LYOPHILIZED, FOR SOLUTION INTRAVENOUS at 08:01

## 2021-01-17 RX ADMIN — MUPIROCIN: 20 OINTMENT TOPICAL at 09:01

## 2021-01-17 RX ADMIN — DOCUSATE SODIUM 200 MG: 50 CAPSULE, LIQUID FILLED ORAL at 08:01

## 2021-01-17 RX ADMIN — POTASSIUM & SODIUM PHOSPHATES POWDER PACK 280-160-250 MG 2 PACKET: 280-160-250 PACK at 05:01

## 2021-01-17 RX ADMIN — INSULIN ASPART 2 UNITS: 100 INJECTION, SOLUTION INTRAVENOUS; SUBCUTANEOUS at 12:01

## 2021-01-17 RX ADMIN — Medication 10 ML: at 12:01

## 2021-01-17 RX ADMIN — ATORVASTATIN CALCIUM 80 MG: 20 TABLET, FILM COATED ORAL at 08:01

## 2021-01-17 RX ADMIN — WARFARIN SODIUM 4 MG: 4 TABLET ORAL at 06:01

## 2021-01-17 RX ADMIN — FUROSEMIDE 7.5 MG/HR: 10 INJECTION, SOLUTION INTRAMUSCULAR; INTRAVENOUS at 05:01

## 2021-01-17 RX ADMIN — INSULIN ASPART 4 UNITS: 100 INJECTION, SOLUTION INTRAVENOUS; SUBCUTANEOUS at 12:01

## 2021-01-17 RX ADMIN — HYDRALAZINE HYDROCHLORIDE 25 MG: 25 TABLET, FILM COATED ORAL at 09:01

## 2021-01-17 RX ADMIN — MAGNESIUM CITRATE 148 ML: 1.75 LIQUID ORAL at 03:01

## 2021-01-17 RX ADMIN — POTASSIUM CHLORIDE 40 MEQ: 14.9 INJECTION, SOLUTION INTRAVENOUS at 08:01

## 2021-01-17 RX ADMIN — INSULIN ASPART 1 UNITS: 100 INJECTION, SOLUTION INTRAVENOUS; SUBCUTANEOUS at 08:01

## 2021-01-17 RX ADMIN — POTASSIUM CHLORIDE 40 MEQ: 14.9 INJECTION, SOLUTION INTRAVENOUS at 05:01

## 2021-01-17 RX ADMIN — INSULIN ASPART 2 UNITS: 100 INJECTION, SOLUTION INTRAVENOUS; SUBCUTANEOUS at 05:01

## 2021-01-17 RX ADMIN — PANTOPRAZOLE SODIUM 40 MG: 40 INJECTION, POWDER, FOR SOLUTION INTRAVENOUS at 08:01

## 2021-01-17 RX ADMIN — POLYETHYLENE GLYCOL 3350 17 G: 17 POWDER, FOR SOLUTION ORAL at 08:01

## 2021-01-17 RX ADMIN — INSULIN ASPART 2 UNITS: 100 INJECTION, SOLUTION INTRAVENOUS; SUBCUTANEOUS at 08:01

## 2021-01-17 RX ADMIN — MUPIROCIN: 20 OINTMENT TOPICAL at 08:01

## 2021-01-18 LAB
ALBUMIN SERPL BCP-MCNC: 2.9 G/DL (ref 3.5–5.2)
ALLENS TEST: ABNORMAL
ALP SERPL-CCNC: 131 U/L (ref 55–135)
ALT SERPL W/O P-5'-P-CCNC: 18 U/L (ref 10–44)
ANION GAP SERPL CALC-SCNC: 11 MMOL/L (ref 8–16)
ANION GAP SERPL CALC-SCNC: 11 MMOL/L (ref 8–16)
ANION GAP SERPL CALC-SCNC: 9 MMOL/L (ref 8–16)
APTT BLDCRRT: 144.4 SEC (ref 21–32)
APTT BLDCRRT: 38.5 SEC (ref 21–32)
APTT BLDCRRT: 41.7 SEC (ref 21–32)
AST SERPL-CCNC: 47 U/L (ref 10–40)
BASOPHILS # BLD AUTO: 0.02 K/UL (ref 0–0.2)
BASOPHILS NFR BLD: 0.2 % (ref 0–1.9)
BILIRUB DIRECT SERPL-MCNC: 1.4 MG/DL (ref 0.1–0.3)
BILIRUB SERPL-MCNC: 2.2 MG/DL (ref 0.1–1)
BNP SERPL-MCNC: 718 PG/ML (ref 0–99)
BUN SERPL-MCNC: 23 MG/DL (ref 8–23)
BUN SERPL-MCNC: 23 MG/DL (ref 8–23)
BUN SERPL-MCNC: 24 MG/DL (ref 8–23)
CALCIUM SERPL-MCNC: 8.5 MG/DL (ref 8.7–10.5)
CALCIUM SERPL-MCNC: 8.5 MG/DL (ref 8.7–10.5)
CALCIUM SERPL-MCNC: 9 MG/DL (ref 8.7–10.5)
CHLORIDE SERPL-SCNC: 96 MMOL/L (ref 95–110)
CHLORIDE SERPL-SCNC: 98 MMOL/L (ref 95–110)
CHLORIDE SERPL-SCNC: 99 MMOL/L (ref 95–110)
CO2 SERPL-SCNC: 26 MMOL/L (ref 23–29)
CO2 SERPL-SCNC: 27 MMOL/L (ref 23–29)
CO2 SERPL-SCNC: 31 MMOL/L (ref 23–29)
CREAT SERPL-MCNC: 1 MG/DL (ref 0.5–1.4)
CRP SERPL-MCNC: 207.1 MG/L (ref 0–8.2)
DELSYS: ABNORMAL
DIFFERENTIAL METHOD: ABNORMAL
EOSINOPHIL # BLD AUTO: 0.3 K/UL (ref 0–0.5)
EOSINOPHIL NFR BLD: 3.5 % (ref 0–8)
ERYTHROCYTE [DISTWIDTH] IN BLOOD BY AUTOMATED COUNT: 13.7 % (ref 11.5–14.5)
EST. GFR  (AFRICAN AMERICAN): >60 ML/MIN/1.73 M^2
EST. GFR  (NON AFRICAN AMERICAN): >60 ML/MIN/1.73 M^2
FLOW: 1
GLUCOSE SERPL-MCNC: 133 MG/DL (ref 70–110)
GLUCOSE SERPL-MCNC: 144 MG/DL (ref 70–110)
GLUCOSE SERPL-MCNC: 162 MG/DL (ref 70–110)
HCO3 UR-SCNC: 31.4 MMOL/L (ref 24–28)
HCT VFR BLD AUTO: 25.5 % (ref 40–54)
HGB BLD-MCNC: 8 G/DL (ref 14–18)
IMM GRANULOCYTES # BLD AUTO: 0.04 K/UL (ref 0–0.04)
IMM GRANULOCYTES NFR BLD AUTO: 0.5 % (ref 0–0.5)
INR PPP: 1.1 (ref 0.8–1.2)
LDH SERPL L TO P-CCNC: 331 U/L (ref 110–260)
LYMPHOCYTES # BLD AUTO: 1.5 K/UL (ref 1–4.8)
LYMPHOCYTES NFR BLD: 17.9 % (ref 18–48)
MAGNESIUM SERPL-MCNC: 2 MG/DL (ref 1.6–2.6)
MAGNESIUM SERPL-MCNC: 2.1 MG/DL (ref 1.6–2.6)
MAGNESIUM SERPL-MCNC: 2.3 MG/DL (ref 1.6–2.6)
MCH RBC QN AUTO: 29.3 PG (ref 27–31)
MCHC RBC AUTO-ENTMCNC: 31.4 G/DL (ref 32–36)
MCV RBC AUTO: 93 FL (ref 82–98)
MODE: ABNORMAL
MONOCYTES # BLD AUTO: 1.2 K/UL (ref 0.3–1)
MONOCYTES NFR BLD: 14.1 % (ref 4–15)
NEUTROPHILS # BLD AUTO: 5.5 K/UL (ref 1.8–7.7)
NEUTROPHILS NFR BLD: 63.8 % (ref 38–73)
NRBC BLD-RTO: 0 /100 WBC
PCO2 BLDA: 45.6 MMHG (ref 35–45)
PH SMN: 7.45 [PH] (ref 7.35–7.45)
PHOSPHATE SERPL-MCNC: 1.9 MG/DL (ref 2.7–4.5)
PHOSPHATE SERPL-MCNC: 2.2 MG/DL (ref 2.7–4.5)
PHOSPHATE SERPL-MCNC: 2.2 MG/DL (ref 2.7–4.5)
PLATELET # BLD AUTO: 143 K/UL (ref 150–350)
PMV BLD AUTO: 11.7 FL (ref 9.2–12.9)
PO2 BLDA: 25 MMHG (ref 40–60)
POC BE: 7 MMOL/L
POC SATURATED O2: 48 % (ref 95–100)
POC TCO2: 33 MMOL/L (ref 24–29)
POCT GLUCOSE: 122 MG/DL (ref 70–110)
POCT GLUCOSE: 133 MG/DL (ref 70–110)
POCT GLUCOSE: 147 MG/DL (ref 70–110)
POTASSIUM SERPL-SCNC: 3.7 MMOL/L (ref 3.5–5.1)
POTASSIUM SERPL-SCNC: 3.8 MMOL/L (ref 3.5–5.1)
POTASSIUM SERPL-SCNC: 4 MMOL/L (ref 3.5–5.1)
PROT SERPL-MCNC: 7.2 G/DL (ref 6–8.4)
PROTHROMBIN TIME: 11.8 SEC (ref 9–12.5)
RBC # BLD AUTO: 2.73 M/UL (ref 4.6–6.2)
SAMPLE: ABNORMAL
SITE: ABNORMAL
SODIUM SERPL-SCNC: 136 MMOL/L (ref 136–145)
WBC # BLD AUTO: 8.6 K/UL (ref 3.9–12.7)

## 2021-01-18 PROCEDURE — 25000003 PHARM REV CODE 250: Performed by: STUDENT IN AN ORGANIZED HEALTH CARE EDUCATION/TRAINING PROGRAM

## 2021-01-18 PROCEDURE — 82803 BLOOD GASES ANY COMBINATION: CPT

## 2021-01-18 PROCEDURE — 85025 COMPLETE CBC W/AUTO DIFF WBC: CPT

## 2021-01-18 PROCEDURE — 94761 N-INVAS EAR/PLS OXIMETRY MLT: CPT

## 2021-01-18 PROCEDURE — 80048 BASIC METABOLIC PNL TOTAL CA: CPT

## 2021-01-18 PROCEDURE — 83735 ASSAY OF MAGNESIUM: CPT

## 2021-01-18 PROCEDURE — 93750 PR INTERROGATE VENT ASSIST DEV, IN PERSON, W PHYSICIAN ANALYSIS: ICD-10-PCS | Mod: ,,, | Performed by: INTERNAL MEDICINE

## 2021-01-18 PROCEDURE — 86900 BLOOD TYPING SEROLOGIC ABO: CPT

## 2021-01-18 PROCEDURE — 20000000 HC ICU ROOM

## 2021-01-18 PROCEDURE — 83615 LACTATE (LD) (LDH) ENZYME: CPT

## 2021-01-18 PROCEDURE — C9113 INJ PANTOPRAZOLE SODIUM, VIA: HCPCS | Performed by: THORACIC SURGERY (CARDIOTHORACIC VASCULAR SURGERY)

## 2021-01-18 PROCEDURE — A4216 STERILE WATER/SALINE, 10 ML: HCPCS | Performed by: THORACIC SURGERY (CARDIOTHORACIC VASCULAR SURGERY)

## 2021-01-18 PROCEDURE — 83880 ASSAY OF NATRIURETIC PEPTIDE: CPT

## 2021-01-18 PROCEDURE — 85730 THROMBOPLASTIN TIME PARTIAL: CPT

## 2021-01-18 PROCEDURE — 99232 SBSQ HOSP IP/OBS MODERATE 35: CPT | Mod: ,,, | Performed by: NURSE PRACTITIONER

## 2021-01-18 PROCEDURE — 99233 PR SUBSEQUENT HOSPITAL CARE,LEVL III: ICD-10-PCS | Mod: ,,, | Performed by: INTERNAL MEDICINE

## 2021-01-18 PROCEDURE — 85610 PROTHROMBIN TIME: CPT

## 2021-01-18 PROCEDURE — 85730 THROMBOPLASTIN TIME PARTIAL: CPT | Mod: 91

## 2021-01-18 PROCEDURE — 63600175 PHARM REV CODE 636 W HCPCS: Performed by: THORACIC SURGERY (CARDIOTHORACIC VASCULAR SURGERY)

## 2021-01-18 PROCEDURE — 25000003 PHARM REV CODE 250: Performed by: HOSPITALIST

## 2021-01-18 PROCEDURE — 83735 ASSAY OF MAGNESIUM: CPT | Mod: 91

## 2021-01-18 PROCEDURE — 99900035 HC TECH TIME PER 15 MIN (STAT)

## 2021-01-18 PROCEDURE — 84100 ASSAY OF PHOSPHORUS: CPT | Mod: 91

## 2021-01-18 PROCEDURE — 80048 BASIC METABOLIC PNL TOTAL CA: CPT | Mod: 91

## 2021-01-18 PROCEDURE — 99232 PR SUBSEQUENT HOSPITAL CARE,LEVL II: ICD-10-PCS | Mod: ,,, | Performed by: NURSE PRACTITIONER

## 2021-01-18 PROCEDURE — 27000221 HC OXYGEN, UP TO 24 HOURS

## 2021-01-18 PROCEDURE — 99233 SBSQ HOSP IP/OBS HIGH 50: CPT | Mod: ,,, | Performed by: INTERNAL MEDICINE

## 2021-01-18 PROCEDURE — 27000248 HC VAD-ADDITIONAL DAY

## 2021-01-18 PROCEDURE — 80076 HEPATIC FUNCTION PANEL: CPT

## 2021-01-18 PROCEDURE — 25000003 PHARM REV CODE 250: Performed by: THORACIC SURGERY (CARDIOTHORACIC VASCULAR SURGERY)

## 2021-01-18 PROCEDURE — 86140 C-REACTIVE PROTEIN: CPT

## 2021-01-18 PROCEDURE — 93750 INTERROGATION VAD IN PERSON: CPT | Mod: ,,, | Performed by: INTERNAL MEDICINE

## 2021-01-18 PROCEDURE — 63600175 PHARM REV CODE 636 W HCPCS: Performed by: STUDENT IN AN ORGANIZED HEALTH CARE EDUCATION/TRAINING PROGRAM

## 2021-01-18 PROCEDURE — 84100 ASSAY OF PHOSPHORUS: CPT

## 2021-01-18 RX ORDER — POTASSIUM CHLORIDE 20 MEQ/1
20 TABLET, EXTENDED RELEASE ORAL 2 TIMES DAILY
Status: DISCONTINUED | OUTPATIENT
Start: 2021-01-18 | End: 2021-01-27

## 2021-01-18 RX ORDER — SODIUM,POTASSIUM PHOSPHATES 280-250MG
1 POWDER IN PACKET (EA) ORAL ONCE
Status: COMPLETED | OUTPATIENT
Start: 2021-01-18 | End: 2021-01-18

## 2021-01-18 RX ORDER — LANOLIN ALCOHOL/MO/W.PET/CERES
400 CREAM (GRAM) TOPICAL 3 TIMES DAILY
Status: DISCONTINUED | OUTPATIENT
Start: 2021-01-18 | End: 2021-02-04 | Stop reason: HOSPADM

## 2021-01-18 RX ORDER — WARFARIN 4 MG/1
4 TABLET ORAL ONCE
Status: COMPLETED | OUTPATIENT
Start: 2021-01-18 | End: 2021-01-18

## 2021-01-18 RX ORDER — SODIUM,POTASSIUM PHOSPHATES 280-250MG
2 POWDER IN PACKET (EA) ORAL ONCE
Status: COMPLETED | OUTPATIENT
Start: 2021-01-18 | End: 2021-01-18

## 2021-01-18 RX ORDER — SYRING-NEEDL,DISP,INSUL,0.3 ML 29 G X1/2"
148 SYRINGE, EMPTY DISPOSABLE MISCELLANEOUS ONCE
Status: COMPLETED | OUTPATIENT
Start: 2021-01-18 | End: 2021-01-18

## 2021-01-18 RX ORDER — AMLODIPINE BESYLATE 5 MG/1
5 TABLET ORAL DAILY
Status: DISCONTINUED | OUTPATIENT
Start: 2021-01-18 | End: 2021-01-18

## 2021-01-18 RX ORDER — HYDRALAZINE HYDROCHLORIDE 25 MG/1
25 TABLET, FILM COATED ORAL ONCE
Status: COMPLETED | OUTPATIENT
Start: 2021-01-18 | End: 2021-01-18

## 2021-01-18 RX ADMIN — AMLODIPINE BESYLATE 5 MG: 5 TABLET ORAL at 08:01

## 2021-01-18 RX ADMIN — POTASSIUM CHLORIDE 20 MEQ: 20 TABLET, EXTENDED RELEASE ORAL at 08:01

## 2021-01-18 RX ADMIN — MAGNESIUM CITRATE 148 ML: 1.75 LIQUID ORAL at 02:01

## 2021-01-18 RX ADMIN — WARFARIN SODIUM 4 MG: 4 TABLET ORAL at 06:01

## 2021-01-18 RX ADMIN — POTASSIUM CHLORIDE 40 MEQ: 14.9 INJECTION, SOLUTION INTRAVENOUS at 02:01

## 2021-01-18 RX ADMIN — ATORVASTATIN CALCIUM 80 MG: 20 TABLET, FILM COATED ORAL at 08:01

## 2021-01-18 RX ADMIN — POTASSIUM CHLORIDE 40 MEQ: 14.9 INJECTION, SOLUTION INTRAVENOUS at 05:01

## 2021-01-18 RX ADMIN — Medication 400 MG: at 02:01

## 2021-01-18 RX ADMIN — Medication 10 ML: at 12:01

## 2021-01-18 RX ADMIN — ASPIRIN 325 MG ORAL TABLET 325 MG: 325 PILL ORAL at 08:01

## 2021-01-18 RX ADMIN — POTASSIUM CHLORIDE 20 MEQ: 20 TABLET, EXTENDED RELEASE ORAL at 09:01

## 2021-01-18 RX ADMIN — INSULIN ASPART 4 UNITS: 100 INJECTION, SOLUTION INTRAVENOUS; SUBCUTANEOUS at 12:01

## 2021-01-18 RX ADMIN — MUPIROCIN: 20 OINTMENT TOPICAL at 08:01

## 2021-01-18 RX ADMIN — POTASSIUM & SODIUM PHOSPHATES POWDER PACK 280-160-250 MG 1 PACKET: 280-160-250 PACK at 09:01

## 2021-01-18 RX ADMIN — Medication 400 MG: at 09:01

## 2021-01-18 RX ADMIN — Medication 10 ML: at 06:01

## 2021-01-18 RX ADMIN — HYDRALAZINE HYDROCHLORIDE 25 MG: 25 TABLET, FILM COATED ORAL at 04:01

## 2021-01-18 RX ADMIN — INSULIN ASPART 4 UNITS: 100 INJECTION, SOLUTION INTRAVENOUS; SUBCUTANEOUS at 06:01

## 2021-01-18 RX ADMIN — Medication 400 MG: at 08:01

## 2021-01-18 RX ADMIN — FUROSEMIDE 7.5 MG/HR: 10 INJECTION, SOLUTION INTRAMUSCULAR; INTRAVENOUS at 08:01

## 2021-01-18 RX ADMIN — INSULIN ASPART 4 UNITS: 100 INJECTION, SOLUTION INTRAVENOUS; SUBCUTANEOUS at 08:01

## 2021-01-18 RX ADMIN — POTASSIUM & SODIUM PHOSPHATES POWDER PACK 280-160-250 MG 2 PACKET: 280-160-250 PACK at 12:01

## 2021-01-18 RX ADMIN — POLYETHYLENE GLYCOL 3350 17 G: 17 POWDER, FOR SOLUTION ORAL at 08:01

## 2021-01-18 RX ADMIN — OXYCODONE 5 MG: 5 TABLET ORAL at 03:01

## 2021-01-18 RX ADMIN — PANTOPRAZOLE SODIUM 40 MG: 40 INJECTION, POWDER, FOR SOLUTION INTRAVENOUS at 08:01

## 2021-01-18 RX ADMIN — HEPARIN SODIUM AND DEXTROSE 1200 UNITS/HR: 10000; 5 INJECTION INTRAVENOUS at 09:01

## 2021-01-18 RX ADMIN — DOCUSATE SODIUM 200 MG: 50 CAPSULE, LIQUID FILLED ORAL at 09:01

## 2021-01-19 LAB
ABO + RH BLD: NORMAL
ALLENS TEST: ABNORMAL
ANION GAP SERPL CALC-SCNC: 10 MMOL/L (ref 8–16)
ANION GAP SERPL CALC-SCNC: 11 MMOL/L (ref 8–16)
ANION GAP SERPL CALC-SCNC: 7 MMOL/L (ref 8–16)
ANION GAP SERPL CALC-SCNC: 8 MMOL/L (ref 8–16)
APTT BLDCRRT: 38.7 SEC (ref 21–32)
APTT BLDCRRT: 42.3 SEC (ref 21–32)
APTT BLDCRRT: 46.4 SEC (ref 21–32)
BASOPHILS # BLD AUTO: 0.04 K/UL (ref 0–0.2)
BASOPHILS NFR BLD: 0.4 % (ref 0–1.9)
BLD GP AB SCN CELLS X3 SERPL QL: NORMAL
BSA FOR ECHO PROCEDURE: 2.37 M2
BUN SERPL-MCNC: 22 MG/DL (ref 8–23)
BUN SERPL-MCNC: 22 MG/DL (ref 8–23)
BUN SERPL-MCNC: 23 MG/DL (ref 8–23)
BUN SERPL-MCNC: 23 MG/DL (ref 8–23)
CA-I BLDV-SCNC: 1.11 MMOL/L (ref 1.06–1.42)
CALCIUM SERPL-MCNC: 8.6 MG/DL (ref 8.7–10.5)
CALCIUM SERPL-MCNC: 8.7 MG/DL (ref 8.7–10.5)
CALCIUM SERPL-MCNC: 8.7 MG/DL (ref 8.7–10.5)
CALCIUM SERPL-MCNC: 9 MG/DL (ref 8.7–10.5)
CHLORIDE SERPL-SCNC: 100 MMOL/L (ref 95–110)
CHLORIDE SERPL-SCNC: 98 MMOL/L (ref 95–110)
CHLORIDE SERPL-SCNC: 98 MMOL/L (ref 95–110)
CHLORIDE SERPL-SCNC: 99 MMOL/L (ref 95–110)
CO2 SERPL-SCNC: 25 MMOL/L (ref 23–29)
CO2 SERPL-SCNC: 26 MMOL/L (ref 23–29)
CO2 SERPL-SCNC: 26 MMOL/L (ref 23–29)
CO2 SERPL-SCNC: 27 MMOL/L (ref 23–29)
CREAT SERPL-MCNC: 0.9 MG/DL (ref 0.5–1.4)
CREAT SERPL-MCNC: 1 MG/DL (ref 0.5–1.4)
DIFFERENTIAL METHOD: ABNORMAL
EOSINOPHIL # BLD AUTO: 0.4 K/UL (ref 0–0.5)
EOSINOPHIL NFR BLD: 3.7 % (ref 0–8)
ERYTHROCYTE [DISTWIDTH] IN BLOOD BY AUTOMATED COUNT: 13.5 % (ref 11.5–14.5)
EST. GFR  (AFRICAN AMERICAN): >60 ML/MIN/1.73 M^2
EST. GFR  (NON AFRICAN AMERICAN): >60 ML/MIN/1.73 M^2
FINAL PATHOLOGIC DIAGNOSIS: NORMAL
FINAL PATHOLOGIC DIAGNOSIS: NORMAL
GLUCOSE SERPL-MCNC: 118 MG/DL (ref 70–110)
GLUCOSE SERPL-MCNC: 125 MG/DL (ref 70–110)
GLUCOSE SERPL-MCNC: 163 MG/DL (ref 70–110)
GLUCOSE SERPL-MCNC: 178 MG/DL (ref 70–110)
GROSS: NORMAL
GROSS: NORMAL
HCO3 UR-SCNC: 26.5 MMOL/L (ref 24–28)
HCT VFR BLD AUTO: 25.8 % (ref 40–54)
HGB BLD-MCNC: 8.4 G/DL (ref 14–18)
IMM GRANULOCYTES # BLD AUTO: 0.05 K/UL (ref 0–0.04)
IMM GRANULOCYTES NFR BLD AUTO: 0.5 % (ref 0–0.5)
INR PPP: 1.1 (ref 0.8–1.2)
LDH SERPL L TO P-CCNC: 323 U/L (ref 110–260)
LYMPHOCYTES # BLD AUTO: 2 K/UL (ref 1–4.8)
LYMPHOCYTES NFR BLD: 20.9 % (ref 18–48)
Lab: NORMAL
Lab: NORMAL
MAGNESIUM SERPL-MCNC: 2.2 MG/DL (ref 1.6–2.6)
MAGNESIUM SERPL-MCNC: 2.3 MG/DL (ref 1.6–2.6)
MCH RBC QN AUTO: 29.9 PG (ref 27–31)
MCHC RBC AUTO-ENTMCNC: 32.6 G/DL (ref 32–36)
MCV RBC AUTO: 92 FL (ref 82–98)
MONOCYTES # BLD AUTO: 1.3 K/UL (ref 0.3–1)
MONOCYTES NFR BLD: 13.4 % (ref 4–15)
NEUTROPHILS # BLD AUTO: 5.9 K/UL (ref 1.8–7.7)
NEUTROPHILS NFR BLD: 61.1 % (ref 38–73)
NRBC BLD-RTO: 0 /100 WBC
PCO2 BLDA: 40 MMHG (ref 35–45)
PH SMN: 7.43 [PH] (ref 7.35–7.45)
PHOSPHATE SERPL-MCNC: 2.4 MG/DL (ref 2.7–4.5)
PHOSPHATE SERPL-MCNC: 2.6 MG/DL (ref 2.7–4.5)
PHOSPHATE SERPL-MCNC: 2.7 MG/DL (ref 2.7–4.5)
PHOSPHATE SERPL-MCNC: 2.9 MG/DL (ref 2.7–4.5)
PLATELET # BLD AUTO: 180 K/UL (ref 150–350)
PMV BLD AUTO: 11.9 FL (ref 9.2–12.9)
PO2 BLDA: 26 MMHG (ref 40–60)
POC BE: 2 MMOL/L
POC SATURATED O2: 51 % (ref 95–100)
POC TCO2: 28 MMOL/L (ref 24–29)
POCT GLUCOSE: 128 MG/DL (ref 70–110)
POCT GLUCOSE: 132 MG/DL (ref 70–110)
POCT GLUCOSE: 157 MG/DL (ref 70–110)
POCT GLUCOSE: 180 MG/DL (ref 70–110)
POTASSIUM SERPL-SCNC: 3.7 MMOL/L (ref 3.5–5.1)
POTASSIUM SERPL-SCNC: 4.2 MMOL/L (ref 3.5–5.1)
POTASSIUM SERPL-SCNC: 4.3 MMOL/L (ref 3.5–5.1)
POTASSIUM SERPL-SCNC: 4.4 MMOL/L (ref 3.5–5.1)
PROTHROMBIN TIME: 11.5 SEC (ref 9–12.5)
RBC # BLD AUTO: 2.81 M/UL (ref 4.6–6.2)
SAMPLE: ABNORMAL
SITE: ABNORMAL
SODIUM SERPL-SCNC: 132 MMOL/L (ref 136–145)
SODIUM SERPL-SCNC: 133 MMOL/L (ref 136–145)
SODIUM SERPL-SCNC: 134 MMOL/L (ref 136–145)
SODIUM SERPL-SCNC: 136 MMOL/L (ref 136–145)
WBC # BLD AUTO: 9.64 K/UL (ref 3.9–12.7)

## 2021-01-19 PROCEDURE — 80048 BASIC METABOLIC PNL TOTAL CA: CPT

## 2021-01-19 PROCEDURE — C9113 INJ PANTOPRAZOLE SODIUM, VIA: HCPCS | Performed by: THORACIC SURGERY (CARDIOTHORACIC VASCULAR SURGERY)

## 2021-01-19 PROCEDURE — 25000003 PHARM REV CODE 250: Performed by: THORACIC SURGERY (CARDIOTHORACIC VASCULAR SURGERY)

## 2021-01-19 PROCEDURE — 93750 INTERROGATION VAD IN PERSON: CPT | Mod: ,,, | Performed by: INTERNAL MEDICINE

## 2021-01-19 PROCEDURE — 83735 ASSAY OF MAGNESIUM: CPT | Mod: 91

## 2021-01-19 PROCEDURE — 63600175 PHARM REV CODE 636 W HCPCS: Performed by: INTERNAL MEDICINE

## 2021-01-19 PROCEDURE — 25000003 PHARM REV CODE 250: Performed by: HOSPITALIST

## 2021-01-19 PROCEDURE — 94761 N-INVAS EAR/PLS OXIMETRY MLT: CPT

## 2021-01-19 PROCEDURE — 83615 LACTATE (LD) (LDH) ENZYME: CPT

## 2021-01-19 PROCEDURE — 63600175 PHARM REV CODE 636 W HCPCS: Performed by: STUDENT IN AN ORGANIZED HEALTH CARE EDUCATION/TRAINING PROGRAM

## 2021-01-19 PROCEDURE — 25000003 PHARM REV CODE 250: Performed by: INTERNAL MEDICINE

## 2021-01-19 PROCEDURE — 25000003 PHARM REV CODE 250: Performed by: STUDENT IN AN ORGANIZED HEALTH CARE EDUCATION/TRAINING PROGRAM

## 2021-01-19 PROCEDURE — 93750 PR INTERROGATE VENT ASSIST DEV, IN PERSON, W PHYSICIAN ANALYSIS: ICD-10-PCS | Mod: ,,, | Performed by: INTERNAL MEDICINE

## 2021-01-19 PROCEDURE — 99900035 HC TECH TIME PER 15 MIN (STAT)

## 2021-01-19 PROCEDURE — 82330 ASSAY OF CALCIUM: CPT

## 2021-01-19 PROCEDURE — 93750 INTERROGATION VAD IN PERSON: CPT | Mod: ,,, | Performed by: THORACIC SURGERY (CARDIOTHORACIC VASCULAR SURGERY)

## 2021-01-19 PROCEDURE — 83735 ASSAY OF MAGNESIUM: CPT

## 2021-01-19 PROCEDURE — 99233 PR SUBSEQUENT HOSPITAL CARE,LEVL III: ICD-10-PCS | Mod: 24,25,, | Performed by: THORACIC SURGERY (CARDIOTHORACIC VASCULAR SURGERY)

## 2021-01-19 PROCEDURE — 92526 ORAL FUNCTION THERAPY: CPT

## 2021-01-19 PROCEDURE — 99233 SBSQ HOSP IP/OBS HIGH 50: CPT | Mod: 24,25,, | Performed by: THORACIC SURGERY (CARDIOTHORACIC VASCULAR SURGERY)

## 2021-01-19 PROCEDURE — 20000000 HC ICU ROOM

## 2021-01-19 PROCEDURE — 84100 ASSAY OF PHOSPHORUS: CPT

## 2021-01-19 PROCEDURE — 84100 ASSAY OF PHOSPHORUS: CPT | Mod: 91

## 2021-01-19 PROCEDURE — 85730 THROMBOPLASTIN TIME PARTIAL: CPT

## 2021-01-19 PROCEDURE — 85610 PROTHROMBIN TIME: CPT

## 2021-01-19 PROCEDURE — 80048 BASIC METABOLIC PNL TOTAL CA: CPT | Mod: 91

## 2021-01-19 PROCEDURE — 63600175 PHARM REV CODE 636 W HCPCS: Performed by: THORACIC SURGERY (CARDIOTHORACIC VASCULAR SURGERY)

## 2021-01-19 PROCEDURE — 27000248 HC VAD-ADDITIONAL DAY

## 2021-01-19 PROCEDURE — 99233 SBSQ HOSP IP/OBS HIGH 50: CPT | Mod: 25,,, | Performed by: INTERNAL MEDICINE

## 2021-01-19 PROCEDURE — 93750 PR INTERROGATE VENT ASSIST DEV, IN PERSON, W PHYSICIAN ANALYSIS: ICD-10-PCS | Mod: ,,, | Performed by: THORACIC SURGERY (CARDIOTHORACIC VASCULAR SURGERY)

## 2021-01-19 PROCEDURE — 85730 THROMBOPLASTIN TIME PARTIAL: CPT | Mod: 91

## 2021-01-19 PROCEDURE — A4216 STERILE WATER/SALINE, 10 ML: HCPCS | Performed by: THORACIC SURGERY (CARDIOTHORACIC VASCULAR SURGERY)

## 2021-01-19 PROCEDURE — 99233 PR SUBSEQUENT HOSPITAL CARE,LEVL III: ICD-10-PCS | Mod: 25,,, | Performed by: INTERNAL MEDICINE

## 2021-01-19 PROCEDURE — 85025 COMPLETE CBC W/AUTO DIFF WBC: CPT

## 2021-01-19 RX ORDER — SODIUM,POTASSIUM PHOSPHATES 280-250MG
2 POWDER IN PACKET (EA) ORAL
Status: DISCONTINUED | OUTPATIENT
Start: 2021-01-19 | End: 2021-02-01

## 2021-01-19 RX ORDER — HEPARIN SODIUM,PORCINE/D5W 25000/250
13 INTRAVENOUS SOLUTION INTRAVENOUS CONTINUOUS
Status: DISCONTINUED | OUTPATIENT
Start: 2021-01-19 | End: 2021-01-26

## 2021-01-19 RX ORDER — SODIUM,POTASSIUM PHOSPHATES 280-250MG
2 POWDER IN PACKET (EA) ORAL ONCE
Status: COMPLETED | OUTPATIENT
Start: 2021-01-19 | End: 2021-01-19

## 2021-01-19 RX ORDER — WARFARIN 4 MG/1
4 TABLET ORAL DAILY
Status: DISCONTINUED | OUTPATIENT
Start: 2021-01-19 | End: 2021-01-21

## 2021-01-19 RX ORDER — HEPARIN SODIUM 10000 [USP'U]/100ML
1100 INJECTION, SOLUTION INTRAVENOUS CONTINUOUS
Status: DISCONTINUED | OUTPATIENT
Start: 2021-01-19 | End: 2021-01-19

## 2021-01-19 RX ADMIN — Medication 400 MG: at 08:01

## 2021-01-19 RX ADMIN — POTASSIUM CHLORIDE 20 MEQ: 20 TABLET, EXTENDED RELEASE ORAL at 09:01

## 2021-01-19 RX ADMIN — Medication 10 ML: at 12:01

## 2021-01-19 RX ADMIN — POTASSIUM & SODIUM PHOSPHATES POWDER PACK 280-160-250 MG 2 PACKET: 280-160-250 PACK at 01:01

## 2021-01-19 RX ADMIN — HEPARIN SODIUM AND DEXTROSE 13 UNITS/KG/HR: 10000; 5 INJECTION INTRAVENOUS at 10:01

## 2021-01-19 RX ADMIN — HEPARIN SODIUM AND DEXTROSE 12 UNITS/KG/HR: 10000; 5 INJECTION INTRAVENOUS at 02:01

## 2021-01-19 RX ADMIN — ATORVASTATIN CALCIUM 80 MG: 20 TABLET, FILM COATED ORAL at 08:01

## 2021-01-19 RX ADMIN — POTASSIUM CHLORIDE 40 MEQ: 14.9 INJECTION, SOLUTION INTRAVENOUS at 01:01

## 2021-01-19 RX ADMIN — PANTOPRAZOLE SODIUM 40 MG: 40 INJECTION, POWDER, FOR SOLUTION INTRAVENOUS at 08:01

## 2021-01-19 RX ADMIN — INSULIN ASPART 4 UNITS: 100 INJECTION, SOLUTION INTRAVENOUS; SUBCUTANEOUS at 04:01

## 2021-01-19 RX ADMIN — INSULIN ASPART 2 UNITS: 100 INJECTION, SOLUTION INTRAVENOUS; SUBCUTANEOUS at 11:01

## 2021-01-19 RX ADMIN — INSULIN ASPART 4 UNITS: 100 INJECTION, SOLUTION INTRAVENOUS; SUBCUTANEOUS at 08:01

## 2021-01-19 RX ADMIN — Medication 10 ML: at 11:01

## 2021-01-19 RX ADMIN — ASPIRIN 325 MG ORAL TABLET 325 MG: 325 PILL ORAL at 08:01

## 2021-01-19 RX ADMIN — Medication 400 MG: at 04:01

## 2021-01-19 RX ADMIN — Medication 10 ML: at 05:01

## 2021-01-19 RX ADMIN — POTASSIUM CHLORIDE 20 MEQ: 20 TABLET, EXTENDED RELEASE ORAL at 08:01

## 2021-01-19 RX ADMIN — DOCUSATE SODIUM 200 MG: 50 CAPSULE, LIQUID FILLED ORAL at 09:01

## 2021-01-19 RX ADMIN — INSULIN ASPART 4 UNITS: 100 INJECTION, SOLUTION INTRAVENOUS; SUBCUTANEOUS at 11:01

## 2021-01-19 RX ADMIN — WARFARIN SODIUM 4 MG: 4 TABLET ORAL at 05:01

## 2021-01-19 RX ADMIN — INSULIN ASPART 2 UNITS: 100 INJECTION, SOLUTION INTRAVENOUS; SUBCUTANEOUS at 08:01

## 2021-01-20 ENCOUNTER — DOCUMENTATION ONLY (OUTPATIENT)
Dept: CARDIOLOGY | Facility: HOSPITAL | Age: 66
End: 2021-01-20

## 2021-01-20 LAB
ALBUMIN SERPL BCP-MCNC: 2.7 G/DL (ref 3.5–5.2)
ALLENS TEST: ABNORMAL
ALP SERPL-CCNC: 113 U/L (ref 55–135)
ALT SERPL W/O P-5'-P-CCNC: 13 U/L (ref 10–44)
ANION GAP SERPL CALC-SCNC: 12 MMOL/L (ref 8–16)
ANION GAP SERPL CALC-SCNC: 7 MMOL/L (ref 8–16)
ANION GAP SERPL CALC-SCNC: 7 MMOL/L (ref 8–16)
ANION GAP SERPL CALC-SCNC: 9 MMOL/L (ref 8–16)
APTT BLDCRRT: 43.5 SEC (ref 21–32)
APTT BLDCRRT: 44.6 SEC (ref 21–32)
APTT BLDCRRT: 47.2 SEC (ref 21–32)
AST SERPL-CCNC: 24 U/L (ref 10–40)
BASOPHILS # BLD AUTO: 0.04 K/UL (ref 0–0.2)
BASOPHILS # BLD AUTO: 0.04 K/UL (ref 0–0.2)
BASOPHILS NFR BLD: 0.3 % (ref 0–1.9)
BASOPHILS NFR BLD: 0.4 % (ref 0–1.9)
BILIRUB DIRECT SERPL-MCNC: 1.1 MG/DL (ref 0.1–0.3)
BILIRUB SERPL-MCNC: 1.7 MG/DL (ref 0.1–1)
BNP SERPL-MCNC: 439 PG/ML (ref 0–99)
BUN SERPL-MCNC: 21 MG/DL (ref 8–23)
CALCIUM SERPL-MCNC: 8.5 MG/DL (ref 8.7–10.5)
CALCIUM SERPL-MCNC: 8.7 MG/DL (ref 8.7–10.5)
CALCIUM SERPL-MCNC: 8.7 MG/DL (ref 8.7–10.5)
CALCIUM SERPL-MCNC: 8.8 MG/DL (ref 8.7–10.5)
CHLORIDE SERPL-SCNC: 101 MMOL/L (ref 95–110)
CO2 SERPL-SCNC: 21 MMOL/L (ref 23–29)
CO2 SERPL-SCNC: 23 MMOL/L (ref 23–29)
CREAT SERPL-MCNC: 0.8 MG/DL (ref 0.5–1.4)
CREAT SERPL-MCNC: 0.8 MG/DL (ref 0.5–1.4)
CREAT SERPL-MCNC: 0.9 MG/DL (ref 0.5–1.4)
CREAT SERPL-MCNC: 0.9 MG/DL (ref 0.5–1.4)
CRP SERPL-MCNC: 98.2 MG/L (ref 0–8.2)
DIFFERENTIAL METHOD: ABNORMAL
DIFFERENTIAL METHOD: ABNORMAL
EOSINOPHIL # BLD AUTO: 0.3 K/UL (ref 0–0.5)
EOSINOPHIL # BLD AUTO: 0.3 K/UL (ref 0–0.5)
EOSINOPHIL NFR BLD: 2.2 % (ref 0–8)
EOSINOPHIL NFR BLD: 3.2 % (ref 0–8)
ERYTHROCYTE [DISTWIDTH] IN BLOOD BY AUTOMATED COUNT: 13.6 % (ref 11.5–14.5)
ERYTHROCYTE [DISTWIDTH] IN BLOOD BY AUTOMATED COUNT: 13.6 % (ref 11.5–14.5)
EST. GFR  (AFRICAN AMERICAN): >60 ML/MIN/1.73 M^2
EST. GFR  (NON AFRICAN AMERICAN): >60 ML/MIN/1.73 M^2
GLUCOSE SERPL-MCNC: 135 MG/DL (ref 70–110)
GLUCOSE SERPL-MCNC: 137 MG/DL (ref 70–110)
GLUCOSE SERPL-MCNC: 141 MG/DL (ref 70–110)
GLUCOSE SERPL-MCNC: 144 MG/DL (ref 70–110)
GLUCOSE SERPL-MCNC: 144 MG/DL (ref 70–110)
HCO3 UR-SCNC: 26.4 MMOL/L (ref 24–28)
HCT VFR BLD AUTO: 26.1 % (ref 40–54)
HCT VFR BLD AUTO: 27 % (ref 40–54)
HGB BLD-MCNC: 8.4 G/DL (ref 14–18)
HGB BLD-MCNC: 8.6 G/DL (ref 14–18)
IMM GRANULOCYTES # BLD AUTO: 0.09 K/UL (ref 0–0.04)
IMM GRANULOCYTES # BLD AUTO: 0.11 K/UL (ref 0–0.04)
IMM GRANULOCYTES NFR BLD AUTO: 0.9 % (ref 0–0.5)
IMM GRANULOCYTES NFR BLD AUTO: 0.9 % (ref 0–0.5)
INR PPP: 1.1 (ref 0.8–1.2)
INR PPP: 1.1 (ref 0.8–1.2)
LDH SERPL L TO P-CCNC: 294 U/L (ref 110–260)
LYMPHOCYTES # BLD AUTO: 2.1 K/UL (ref 1–4.8)
LYMPHOCYTES # BLD AUTO: 2.5 K/UL (ref 1–4.8)
LYMPHOCYTES NFR BLD: 18 % (ref 18–48)
LYMPHOCYTES NFR BLD: 24.4 % (ref 18–48)
MAGNESIUM SERPL-MCNC: 2.1 MG/DL (ref 1.6–2.6)
MAGNESIUM SERPL-MCNC: 2.2 MG/DL (ref 1.6–2.6)
MCH RBC QN AUTO: 30.2 PG (ref 27–31)
MCH RBC QN AUTO: 30.6 PG (ref 27–31)
MCHC RBC AUTO-ENTMCNC: 31.9 G/DL (ref 32–36)
MCHC RBC AUTO-ENTMCNC: 32.2 G/DL (ref 32–36)
MCV RBC AUTO: 94 FL (ref 82–98)
MCV RBC AUTO: 96 FL (ref 82–98)
MONOCYTES # BLD AUTO: 1.4 K/UL (ref 0.3–1)
MONOCYTES # BLD AUTO: 1.5 K/UL (ref 0.3–1)
MONOCYTES NFR BLD: 13.1 % (ref 4–15)
MONOCYTES NFR BLD: 13.3 % (ref 4–15)
NEUTROPHILS # BLD AUTO: 5.9 K/UL (ref 1.8–7.7)
NEUTROPHILS # BLD AUTO: 7.7 K/UL (ref 1.8–7.7)
NEUTROPHILS NFR BLD: 57.8 % (ref 38–73)
NEUTROPHILS NFR BLD: 65.5 % (ref 38–73)
NRBC BLD-RTO: 0 /100 WBC
NRBC BLD-RTO: 0 /100 WBC
PCO2 BLDA: 40.6 MMHG (ref 35–45)
PH SMN: 7.42 [PH] (ref 7.35–7.45)
PHOSPHATE SERPL-MCNC: 2.5 MG/DL (ref 2.7–4.5)
PHOSPHATE SERPL-MCNC: 2.6 MG/DL (ref 2.7–4.5)
PHOSPHATE SERPL-MCNC: 2.9 MG/DL (ref 2.7–4.5)
PHOSPHATE SERPL-MCNC: 3 MG/DL (ref 2.7–4.5)
PLATELET # BLD AUTO: 201 K/UL (ref 150–350)
PLATELET # BLD AUTO: 220 K/UL (ref 150–350)
PMV BLD AUTO: 10.8 FL (ref 9.2–12.9)
PMV BLD AUTO: 10.9 FL (ref 9.2–12.9)
PO2 BLDA: 27 MMHG (ref 40–60)
POC BE: 2 MMOL/L
POC SATURATED O2: 53 % (ref 95–100)
POC TCO2: 28 MMOL/L (ref 24–29)
POCT GLUCOSE: 142 MG/DL (ref 70–110)
POCT GLUCOSE: 146 MG/DL (ref 70–110)
POCT GLUCOSE: 175 MG/DL (ref 70–110)
POTASSIUM SERPL-SCNC: 4.2 MMOL/L (ref 3.5–5.1)
POTASSIUM SERPL-SCNC: 4.3 MMOL/L (ref 3.5–5.1)
POTASSIUM SERPL-SCNC: 4.9 MMOL/L (ref 3.5–5.1)
POTASSIUM SERPL-SCNC: 4.9 MMOL/L (ref 3.5–5.1)
PROT SERPL-MCNC: 7.2 G/DL (ref 6–8.4)
PROTHROMBIN TIME: 11.7 SEC (ref 9–12.5)
PROTHROMBIN TIME: 11.9 SEC (ref 9–12.5)
RBC # BLD AUTO: 2.78 M/UL (ref 4.6–6.2)
RBC # BLD AUTO: 2.81 M/UL (ref 4.6–6.2)
SAMPLE: ABNORMAL
SITE: ABNORMAL
SODIUM SERPL-SCNC: 131 MMOL/L (ref 136–145)
SODIUM SERPL-SCNC: 131 MMOL/L (ref 136–145)
SODIUM SERPL-SCNC: 133 MMOL/L (ref 136–145)
SODIUM SERPL-SCNC: 134 MMOL/L (ref 136–145)
WBC # BLD AUTO: 10.14 K/UL (ref 3.9–12.7)
WBC # BLD AUTO: 11.7 K/UL (ref 3.9–12.7)

## 2021-01-20 PROCEDURE — 93750 PR INTERROGATE VENT ASSIST DEV, IN PERSON, W PHYSICIAN ANALYSIS: ICD-10-PCS | Mod: ,,, | Performed by: INTERNAL MEDICINE

## 2021-01-20 PROCEDURE — 27000248 HC VAD-ADDITIONAL DAY

## 2021-01-20 PROCEDURE — 85610 PROTHROMBIN TIME: CPT

## 2021-01-20 PROCEDURE — 83615 LACTATE (LD) (LDH) ENZYME: CPT

## 2021-01-20 PROCEDURE — 99233 SBSQ HOSP IP/OBS HIGH 50: CPT | Mod: 24,25,, | Performed by: THORACIC SURGERY (CARDIOTHORACIC VASCULAR SURGERY)

## 2021-01-20 PROCEDURE — 25000003 PHARM REV CODE 250: Performed by: HOSPITALIST

## 2021-01-20 PROCEDURE — 94761 N-INVAS EAR/PLS OXIMETRY MLT: CPT

## 2021-01-20 PROCEDURE — 85610 PROTHROMBIN TIME: CPT | Mod: 91

## 2021-01-20 PROCEDURE — 84100 ASSAY OF PHOSPHORUS: CPT | Mod: 91

## 2021-01-20 PROCEDURE — 25000003 PHARM REV CODE 250: Performed by: INTERNAL MEDICINE

## 2021-01-20 PROCEDURE — 20000000 HC ICU ROOM

## 2021-01-20 PROCEDURE — 80048 BASIC METABOLIC PNL TOTAL CA: CPT | Mod: 91

## 2021-01-20 PROCEDURE — 86140 C-REACTIVE PROTEIN: CPT

## 2021-01-20 PROCEDURE — 25000003 PHARM REV CODE 250: Performed by: STUDENT IN AN ORGANIZED HEALTH CARE EDUCATION/TRAINING PROGRAM

## 2021-01-20 PROCEDURE — 83880 ASSAY OF NATRIURETIC PEPTIDE: CPT

## 2021-01-20 PROCEDURE — 93750 INTERROGATION VAD IN PERSON: CPT | Mod: ,,, | Performed by: THORACIC SURGERY (CARDIOTHORACIC VASCULAR SURGERY)

## 2021-01-20 PROCEDURE — 85730 THROMBOPLASTIN TIME PARTIAL: CPT | Mod: 91

## 2021-01-20 PROCEDURE — 99233 SBSQ HOSP IP/OBS HIGH 50: CPT | Mod: 25,,, | Performed by: INTERNAL MEDICINE

## 2021-01-20 PROCEDURE — 85025 COMPLETE CBC W/AUTO DIFF WBC: CPT | Mod: 91

## 2021-01-20 PROCEDURE — 99900035 HC TECH TIME PER 15 MIN (STAT)

## 2021-01-20 PROCEDURE — 80076 HEPATIC FUNCTION PANEL: CPT

## 2021-01-20 PROCEDURE — C9113 INJ PANTOPRAZOLE SODIUM, VIA: HCPCS | Performed by: THORACIC SURGERY (CARDIOTHORACIC VASCULAR SURGERY)

## 2021-01-20 PROCEDURE — 93750 INTERROGATION VAD IN PERSON: CPT | Mod: ,,, | Performed by: INTERNAL MEDICINE

## 2021-01-20 PROCEDURE — A4216 STERILE WATER/SALINE, 10 ML: HCPCS | Performed by: THORACIC SURGERY (CARDIOTHORACIC VASCULAR SURGERY)

## 2021-01-20 PROCEDURE — 97535 SELF CARE MNGMENT TRAINING: CPT

## 2021-01-20 PROCEDURE — 93750 PR INTERROGATE VENT ASSIST DEV, IN PERSON, W PHYSICIAN ANALYSIS: ICD-10-PCS | Mod: ,,, | Performed by: THORACIC SURGERY (CARDIOTHORACIC VASCULAR SURGERY)

## 2021-01-20 PROCEDURE — 97530 THERAPEUTIC ACTIVITIES: CPT

## 2021-01-20 PROCEDURE — 99233 PR SUBSEQUENT HOSPITAL CARE,LEVL III: ICD-10-PCS | Mod: 24,25,, | Performed by: THORACIC SURGERY (CARDIOTHORACIC VASCULAR SURGERY)

## 2021-01-20 PROCEDURE — 63600175 PHARM REV CODE 636 W HCPCS: Performed by: THORACIC SURGERY (CARDIOTHORACIC VASCULAR SURGERY)

## 2021-01-20 PROCEDURE — 83735 ASSAY OF MAGNESIUM: CPT | Mod: 91

## 2021-01-20 PROCEDURE — 97116 GAIT TRAINING THERAPY: CPT

## 2021-01-20 PROCEDURE — 25000003 PHARM REV CODE 250: Performed by: THORACIC SURGERY (CARDIOTHORACIC VASCULAR SURGERY)

## 2021-01-20 PROCEDURE — 99233 PR SUBSEQUENT HOSPITAL CARE,LEVL III: ICD-10-PCS | Mod: 25,,, | Performed by: INTERNAL MEDICINE

## 2021-01-20 RX ADMIN — DOCUSATE SODIUM 200 MG: 50 CAPSULE, LIQUID FILLED ORAL at 08:01

## 2021-01-20 RX ADMIN — PANTOPRAZOLE SODIUM 40 MG: 40 INJECTION, POWDER, FOR SOLUTION INTRAVENOUS at 08:01

## 2021-01-20 RX ADMIN — INSULIN ASPART 4 UNITS: 100 INJECTION, SOLUTION INTRAVENOUS; SUBCUTANEOUS at 05:01

## 2021-01-20 RX ADMIN — Medication 10 ML: at 05:01

## 2021-01-20 RX ADMIN — Medication 400 MG: at 08:01

## 2021-01-20 RX ADMIN — ASPIRIN 325 MG ORAL TABLET 325 MG: 325 PILL ORAL at 08:01

## 2021-01-20 RX ADMIN — POLYETHYLENE GLYCOL 3350 17 G: 17 POWDER, FOR SOLUTION ORAL at 08:01

## 2021-01-20 RX ADMIN — Medication 10 ML: at 12:01

## 2021-01-20 RX ADMIN — POTASSIUM CHLORIDE 20 MEQ: 20 TABLET, EXTENDED RELEASE ORAL at 08:01

## 2021-01-20 RX ADMIN — INSULIN ASPART 4 UNITS: 100 INJECTION, SOLUTION INTRAVENOUS; SUBCUTANEOUS at 12:01

## 2021-01-20 RX ADMIN — Medication 10 ML: at 06:01

## 2021-01-20 RX ADMIN — POTASSIUM & SODIUM PHOSPHATES POWDER PACK 280-160-250 MG 2 PACKET: 280-160-250 PACK at 05:01

## 2021-01-20 RX ADMIN — ATORVASTATIN CALCIUM 80 MG: 20 TABLET, FILM COATED ORAL at 08:01

## 2021-01-20 RX ADMIN — WARFARIN SODIUM 4 MG: 4 TABLET ORAL at 05:01

## 2021-01-20 RX ADMIN — INSULIN ASPART 4 UNITS: 100 INJECTION, SOLUTION INTRAVENOUS; SUBCUTANEOUS at 07:01

## 2021-01-21 LAB
ALLENS TEST: ABNORMAL
ANION GAP SERPL CALC-SCNC: 10 MMOL/L (ref 8–16)
ANION GAP SERPL CALC-SCNC: 9 MMOL/L (ref 8–16)
APTT BLDCRRT: 46.9 SEC (ref 21–32)
BASOPHILS # BLD AUTO: 0.03 K/UL (ref 0–0.2)
BASOPHILS NFR BLD: 0.3 % (ref 0–1.9)
BUN SERPL-MCNC: 22 MG/DL (ref 8–23)
BUN SERPL-MCNC: 25 MG/DL (ref 8–23)
CALCIUM SERPL-MCNC: 8.6 MG/DL (ref 8.7–10.5)
CALCIUM SERPL-MCNC: 8.8 MG/DL (ref 8.7–10.5)
CHLORIDE SERPL-SCNC: 101 MMOL/L (ref 95–110)
CHLORIDE SERPL-SCNC: 103 MMOL/L (ref 95–110)
CO2 SERPL-SCNC: 21 MMOL/L (ref 23–29)
CO2 SERPL-SCNC: 21 MMOL/L (ref 23–29)
CREAT SERPL-MCNC: 0.9 MG/DL (ref 0.5–1.4)
CREAT SERPL-MCNC: 1.2 MG/DL (ref 0.5–1.4)
DIFFERENTIAL METHOD: ABNORMAL
EOSINOPHIL # BLD AUTO: 0.3 K/UL (ref 0–0.5)
EOSINOPHIL NFR BLD: 2.9 % (ref 0–8)
ERYTHROCYTE [DISTWIDTH] IN BLOOD BY AUTOMATED COUNT: 13.8 % (ref 11.5–14.5)
EST. GFR  (AFRICAN AMERICAN): >60 ML/MIN/1.73 M^2
EST. GFR  (AFRICAN AMERICAN): >60 ML/MIN/1.73 M^2
EST. GFR  (NON AFRICAN AMERICAN): >60 ML/MIN/1.73 M^2
EST. GFR  (NON AFRICAN AMERICAN): >60 ML/MIN/1.73 M^2
GLUCOSE SERPL-MCNC: 127 MG/DL (ref 70–110)
GLUCOSE SERPL-MCNC: 250 MG/DL (ref 70–110)
HCO3 UR-SCNC: 24 MMOL/L (ref 24–28)
HCT VFR BLD AUTO: 24.9 % (ref 40–54)
HGB BLD-MCNC: 7.9 G/DL (ref 14–18)
IMM GRANULOCYTES # BLD AUTO: 0.08 K/UL (ref 0–0.04)
IMM GRANULOCYTES NFR BLD AUTO: 0.8 % (ref 0–0.5)
INR PPP: 1.1 (ref 0.8–1.2)
LDH SERPL L TO P-CCNC: 250 U/L (ref 110–260)
LYMPHOCYTES # BLD AUTO: 2.6 K/UL (ref 1–4.8)
LYMPHOCYTES NFR BLD: 24.3 % (ref 18–48)
MAGNESIUM SERPL-MCNC: 2 MG/DL (ref 1.6–2.6)
MAGNESIUM SERPL-MCNC: 2 MG/DL (ref 1.6–2.6)
MCH RBC QN AUTO: 29.9 PG (ref 27–31)
MCHC RBC AUTO-ENTMCNC: 31.7 G/DL (ref 32–36)
MCV RBC AUTO: 94 FL (ref 82–98)
MONOCYTES # BLD AUTO: 1.4 K/UL (ref 0.3–1)
MONOCYTES NFR BLD: 12.7 % (ref 4–15)
NEUTROPHILS # BLD AUTO: 6.3 K/UL (ref 1.8–7.7)
NEUTROPHILS NFR BLD: 59 % (ref 38–73)
NRBC BLD-RTO: 0 /100 WBC
PCO2 BLDA: 38.2 MMHG (ref 35–45)
PH SMN: 7.41 [PH] (ref 7.35–7.45)
PHOSPHATE SERPL-MCNC: 3 MG/DL (ref 2.7–4.5)
PHOSPHATE SERPL-MCNC: 3.5 MG/DL (ref 2.7–4.5)
PLATELET # BLD AUTO: 218 K/UL (ref 150–350)
PMV BLD AUTO: 10.3 FL (ref 9.2–12.9)
PO2 BLDA: 31 MMHG (ref 40–60)
POC BE: -1 MMOL/L
POC SATURATED O2: 60 % (ref 95–100)
POC TCO2: 25 MMOL/L (ref 24–29)
POCT GLUCOSE: 139 MG/DL (ref 70–110)
POCT GLUCOSE: 140 MG/DL (ref 70–110)
POCT GLUCOSE: 160 MG/DL (ref 70–110)
POCT GLUCOSE: 163 MG/DL (ref 70–110)
POTASSIUM SERPL-SCNC: 4.5 MMOL/L (ref 3.5–5.1)
POTASSIUM SERPL-SCNC: 4.5 MMOL/L (ref 3.5–5.1)
PREALB SERPL-MCNC: 7 MG/DL (ref 20–43)
PROTHROMBIN TIME: 11.9 SEC (ref 9–12.5)
RBC # BLD AUTO: 2.64 M/UL (ref 4.6–6.2)
SAMPLE: ABNORMAL
SITE: ABNORMAL
SODIUM SERPL-SCNC: 131 MMOL/L (ref 136–145)
SODIUM SERPL-SCNC: 134 MMOL/L (ref 136–145)
WBC # BLD AUTO: 10.59 K/UL (ref 3.9–12.7)

## 2021-01-21 PROCEDURE — 93750 INTERROGATION VAD IN PERSON: CPT | Mod: ,,, | Performed by: INTERNAL MEDICINE

## 2021-01-21 PROCEDURE — 84100 ASSAY OF PHOSPHORUS: CPT | Mod: 91

## 2021-01-21 PROCEDURE — 63600175 PHARM REV CODE 636 W HCPCS: Performed by: STUDENT IN AN ORGANIZED HEALTH CARE EDUCATION/TRAINING PROGRAM

## 2021-01-21 PROCEDURE — 93750 INTERROGATION VAD IN PERSON: CPT | Mod: ,,, | Performed by: THORACIC SURGERY (CARDIOTHORACIC VASCULAR SURGERY)

## 2021-01-21 PROCEDURE — 25000003 PHARM REV CODE 250: Performed by: STUDENT IN AN ORGANIZED HEALTH CARE EDUCATION/TRAINING PROGRAM

## 2021-01-21 PROCEDURE — 99900035 HC TECH TIME PER 15 MIN (STAT)

## 2021-01-21 PROCEDURE — 83735 ASSAY OF MAGNESIUM: CPT | Mod: 91

## 2021-01-21 PROCEDURE — 85610 PROTHROMBIN TIME: CPT

## 2021-01-21 PROCEDURE — 93010 ELECTROCARDIOGRAM REPORT: CPT | Mod: ,,, | Performed by: INTERNAL MEDICINE

## 2021-01-21 PROCEDURE — 97116 GAIT TRAINING THERAPY: CPT

## 2021-01-21 PROCEDURE — 25000003 PHARM REV CODE 250: Performed by: THORACIC SURGERY (CARDIOTHORACIC VASCULAR SURGERY)

## 2021-01-21 PROCEDURE — 84100 ASSAY OF PHOSPHORUS: CPT

## 2021-01-21 PROCEDURE — A4216 STERILE WATER/SALINE, 10 ML: HCPCS | Performed by: THORACIC SURGERY (CARDIOTHORACIC VASCULAR SURGERY)

## 2021-01-21 PROCEDURE — 83615 LACTATE (LD) (LDH) ENZYME: CPT

## 2021-01-21 PROCEDURE — 93750 PR INTERROGATE VENT ASSIST DEV, IN PERSON, W PHYSICIAN ANALYSIS: ICD-10-PCS | Mod: ,,, | Performed by: INTERNAL MEDICINE

## 2021-01-21 PROCEDURE — 63600175 PHARM REV CODE 636 W HCPCS: Performed by: THORACIC SURGERY (CARDIOTHORACIC VASCULAR SURGERY)

## 2021-01-21 PROCEDURE — 85730 THROMBOPLASTIN TIME PARTIAL: CPT

## 2021-01-21 PROCEDURE — 20000000 HC ICU ROOM

## 2021-01-21 PROCEDURE — 93005 ELECTROCARDIOGRAM TRACING: CPT

## 2021-01-21 PROCEDURE — 84134 ASSAY OF PREALBUMIN: CPT

## 2021-01-21 PROCEDURE — 99233 SBSQ HOSP IP/OBS HIGH 50: CPT | Mod: 24,25,, | Performed by: THORACIC SURGERY (CARDIOTHORACIC VASCULAR SURGERY)

## 2021-01-21 PROCEDURE — A4216 STERILE WATER/SALINE, 10 ML: HCPCS | Performed by: STUDENT IN AN ORGANIZED HEALTH CARE EDUCATION/TRAINING PROGRAM

## 2021-01-21 PROCEDURE — 99233 SBSQ HOSP IP/OBS HIGH 50: CPT | Mod: 25,,, | Performed by: INTERNAL MEDICINE

## 2021-01-21 PROCEDURE — 27000248 HC VAD-ADDITIONAL DAY

## 2021-01-21 PROCEDURE — 83735 ASSAY OF MAGNESIUM: CPT

## 2021-01-21 PROCEDURE — 99233 PR SUBSEQUENT HOSPITAL CARE,LEVL III: ICD-10-PCS | Mod: 25,,, | Performed by: INTERNAL MEDICINE

## 2021-01-21 PROCEDURE — C9113 INJ PANTOPRAZOLE SODIUM, VIA: HCPCS | Performed by: THORACIC SURGERY (CARDIOTHORACIC VASCULAR SURGERY)

## 2021-01-21 PROCEDURE — 80048 BASIC METABOLIC PNL TOTAL CA: CPT | Mod: 91

## 2021-01-21 PROCEDURE — 63600175 PHARM REV CODE 636 W HCPCS

## 2021-01-21 PROCEDURE — 80048 BASIC METABOLIC PNL TOTAL CA: CPT

## 2021-01-21 PROCEDURE — 94761 N-INVAS EAR/PLS OXIMETRY MLT: CPT

## 2021-01-21 PROCEDURE — 25000003 PHARM REV CODE 250: Performed by: INTERNAL MEDICINE

## 2021-01-21 PROCEDURE — 93750 PR INTERROGATE VENT ASSIST DEV, IN PERSON, W PHYSICIAN ANALYSIS: ICD-10-PCS | Mod: ,,, | Performed by: THORACIC SURGERY (CARDIOTHORACIC VASCULAR SURGERY)

## 2021-01-21 PROCEDURE — 93010 EKG 12-LEAD: ICD-10-PCS | Mod: ,,, | Performed by: INTERNAL MEDICINE

## 2021-01-21 PROCEDURE — 99233 PR SUBSEQUENT HOSPITAL CARE,LEVL III: ICD-10-PCS | Mod: 24,25,, | Performed by: THORACIC SURGERY (CARDIOTHORACIC VASCULAR SURGERY)

## 2021-01-21 PROCEDURE — 97803 MED NUTRITION INDIV SUBSEQ: CPT

## 2021-01-21 PROCEDURE — 85025 COMPLETE CBC W/AUTO DIFF WBC: CPT

## 2021-01-21 PROCEDURE — 97530 THERAPEUTIC ACTIVITIES: CPT

## 2021-01-21 PROCEDURE — 25000003 PHARM REV CODE 250: Performed by: HOSPITALIST

## 2021-01-21 RX ORDER — ADENOSINE 3 MG/ML
INJECTION, SOLUTION INTRAVENOUS
Status: COMPLETED
Start: 2021-01-21 | End: 2021-01-21

## 2021-01-21 RX ORDER — METOPROLOL TARTRATE 1 MG/ML
INJECTION, SOLUTION INTRAVENOUS
Status: DISPENSED
Start: 2021-01-21 | End: 2021-01-22

## 2021-01-21 RX ORDER — WARFARIN 3 MG/1
6 TABLET ORAL DAILY
Status: DISCONTINUED | OUTPATIENT
Start: 2021-01-21 | End: 2021-01-23

## 2021-01-21 RX ORDER — ASPIRIN 81 MG/1
81 TABLET ORAL DAILY
Status: DISCONTINUED | OUTPATIENT
Start: 2021-01-22 | End: 2021-01-25

## 2021-01-21 RX ADMIN — Medication 400 MG: at 09:01

## 2021-01-21 RX ADMIN — POTASSIUM CHLORIDE 20 MEQ: 20 TABLET, EXTENDED RELEASE ORAL at 09:01

## 2021-01-21 RX ADMIN — ADENOSINE 6 MG: 3 INJECTION, SOLUTION INTRAVENOUS at 02:01

## 2021-01-21 RX ADMIN — PANTOPRAZOLE SODIUM 40 MG: 40 INJECTION, POWDER, FOR SOLUTION INTRAVENOUS at 09:01

## 2021-01-21 RX ADMIN — INSULIN ASPART 4 UNITS: 100 INJECTION, SOLUTION INTRAVENOUS; SUBCUTANEOUS at 04:01

## 2021-01-21 RX ADMIN — AMIODARONE HYDROCHLORIDE 150 MG: 1.5 INJECTION, SOLUTION INTRAVENOUS at 03:01

## 2021-01-21 RX ADMIN — ASPIRIN 325 MG ORAL TABLET 325 MG: 325 PILL ORAL at 09:01

## 2021-01-21 RX ADMIN — AMIODARONE HYDROCHLORIDE 0.5 MG/MIN: 1.8 INJECTION, SOLUTION INTRAVENOUS at 09:01

## 2021-01-21 RX ADMIN — WARFARIN SODIUM 6 MG: 5 TABLET ORAL at 04:01

## 2021-01-21 RX ADMIN — INSULIN ASPART 4 UNITS: 100 INJECTION, SOLUTION INTRAVENOUS; SUBCUTANEOUS at 12:01

## 2021-01-21 RX ADMIN — Medication 400 MG: at 03:01

## 2021-01-21 RX ADMIN — Medication 10 ML: at 12:01

## 2021-01-21 RX ADMIN — HEPARIN SODIUM AND DEXTROSE 13 UNITS/KG/HR: 10000; 5 INJECTION INTRAVENOUS at 03:01

## 2021-01-21 RX ADMIN — Medication 10 ML: at 06:01

## 2021-01-21 RX ADMIN — ATORVASTATIN CALCIUM 80 MG: 20 TABLET, FILM COATED ORAL at 09:01

## 2021-01-21 RX ADMIN — POLYETHYLENE GLYCOL 3350 17 G: 17 POWDER, FOR SOLUTION ORAL at 09:01

## 2021-01-21 RX ADMIN — INSULIN ASPART 4 UNITS: 100 INJECTION, SOLUTION INTRAVENOUS; SUBCUTANEOUS at 08:01

## 2021-01-21 RX ADMIN — AMIODARONE HYDROCHLORIDE 1 MG/MIN: 1.8 INJECTION, SOLUTION INTRAVENOUS at 03:01

## 2021-01-21 RX ADMIN — Medication 10 ML: at 05:01

## 2021-01-22 LAB
ALBUMIN SERPL BCP-MCNC: 2.5 G/DL (ref 3.5–5.2)
ALBUMIN SERPL BCP-MCNC: 2.5 G/DL (ref 3.5–5.2)
ALLENS TEST: ABNORMAL
ALP SERPL-CCNC: 107 U/L (ref 55–135)
ALP SERPL-CCNC: 107 U/L (ref 55–135)
ALT SERPL W/O P-5'-P-CCNC: 8 U/L (ref 10–44)
ALT SERPL W/O P-5'-P-CCNC: 8 U/L (ref 10–44)
ANION GAP SERPL CALC-SCNC: 9 MMOL/L (ref 8–16)
ANION GAP SERPL CALC-SCNC: 9 MMOL/L (ref 8–16)
APTT BLDCRRT: 47.3 SEC (ref 21–32)
ASCENDING AORTA: 3.28 CM
AST SERPL-CCNC: 17 U/L (ref 10–40)
AST SERPL-CCNC: 17 U/L (ref 10–40)
BASOPHILS # BLD AUTO: 0.05 K/UL (ref 0–0.2)
BASOPHILS # BLD AUTO: 0.05 K/UL (ref 0–0.2)
BASOPHILS NFR BLD: 0.5 % (ref 0–1.9)
BASOPHILS NFR BLD: 0.5 % (ref 0–1.9)
BILIRUB DIRECT SERPL-MCNC: 0.7 MG/DL (ref 0.1–0.3)
BILIRUB DIRECT SERPL-MCNC: 0.7 MG/DL (ref 0.1–0.3)
BILIRUB SERPL-MCNC: 1.2 MG/DL (ref 0.1–1)
BILIRUB SERPL-MCNC: 1.2 MG/DL (ref 0.1–1)
BNP SERPL-MCNC: 336 PG/ML (ref 0–99)
BNP SERPL-MCNC: 336 PG/ML (ref 0–99)
BSA FOR ECHO PROCEDURE: 2.36 M2
BUN SERPL-MCNC: 23 MG/DL (ref 8–23)
BUN SERPL-MCNC: 23 MG/DL (ref 8–23)
CALCIUM SERPL-MCNC: 8.4 MG/DL (ref 8.7–10.5)
CALCIUM SERPL-MCNC: 8.4 MG/DL (ref 8.7–10.5)
CHLORIDE SERPL-SCNC: 99 MMOL/L (ref 95–110)
CHLORIDE SERPL-SCNC: 99 MMOL/L (ref 95–110)
CO2 SERPL-SCNC: 21 MMOL/L (ref 23–29)
CO2 SERPL-SCNC: 21 MMOL/L (ref 23–29)
CREAT SERPL-MCNC: 1.1 MG/DL (ref 0.5–1.4)
CREAT SERPL-MCNC: 1.1 MG/DL (ref 0.5–1.4)
CRP SERPL-MCNC: 52.8 MG/L (ref 0–8.2)
CRP SERPL-MCNC: 52.8 MG/L (ref 0–8.2)
CV ECHO LV RWT: 0.45 CM
DELSYS: ABNORMAL
DIFFERENTIAL METHOD: ABNORMAL
DIFFERENTIAL METHOD: ABNORMAL
DOP CALC LVOT AREA: 3.3 CM2
DOP CALC LVOT DIAMETER: 2.04 CM
E WAVE DECELERATION TIME: 132.74 MSEC
E/A RATIO: 1.36
E/E' RATIO: 9.71 M/S
ECHO LV POSTERIOR WALL: 0.97 CM (ref 0.6–1.1)
EOSINOPHIL # BLD AUTO: 0.2 K/UL (ref 0–0.5)
EOSINOPHIL # BLD AUTO: 0.2 K/UL (ref 0–0.5)
EOSINOPHIL NFR BLD: 2.1 % (ref 0–8)
EOSINOPHIL NFR BLD: 2.1 % (ref 0–8)
ERYTHROCYTE [DISTWIDTH] IN BLOOD BY AUTOMATED COUNT: 14.1 % (ref 11.5–14.5)
ERYTHROCYTE [DISTWIDTH] IN BLOOD BY AUTOMATED COUNT: 14.1 % (ref 11.5–14.5)
EST. GFR  (AFRICAN AMERICAN): >60 ML/MIN/1.73 M^2
EST. GFR  (AFRICAN AMERICAN): >60 ML/MIN/1.73 M^2
EST. GFR  (NON AFRICAN AMERICAN): >60 ML/MIN/1.73 M^2
EST. GFR  (NON AFRICAN AMERICAN): >60 ML/MIN/1.73 M^2
FIO2: 21
FRACTIONAL SHORTENING: 2 % (ref 28–44)
GLUCOSE SERPL-MCNC: 259 MG/DL (ref 70–110)
GLUCOSE SERPL-MCNC: 259 MG/DL (ref 70–110)
HCO3 UR-SCNC: 22.5 MMOL/L (ref 24–28)
HCT VFR BLD AUTO: 23.5 % (ref 40–54)
HCT VFR BLD AUTO: 23.5 % (ref 40–54)
HGB BLD-MCNC: 7.5 G/DL (ref 14–18)
HGB BLD-MCNC: 7.5 G/DL (ref 14–18)
IMM GRANULOCYTES # BLD AUTO: 0.13 K/UL (ref 0–0.04)
IMM GRANULOCYTES # BLD AUTO: 0.13 K/UL (ref 0–0.04)
IMM GRANULOCYTES NFR BLD AUTO: 1.2 % (ref 0–0.5)
IMM GRANULOCYTES NFR BLD AUTO: 1.2 % (ref 0–0.5)
INR PPP: 1.1 (ref 0.8–1.2)
INTERVENTRICULAR SEPTUM: 0.97 CM (ref 0.6–1.1)
LDH SERPL L TO P-CCNC: 359 U/L (ref 110–260)
LEFT ATRIUM SIZE: 3.05 CM
LEFT INTERNAL DIMENSION IN SYSTOLE: 4.25 CM (ref 2.1–4)
LEFT VENTRICLE DIASTOLIC VOLUME INDEX: 36.74 ML/M2
LEFT VENTRICLE DIASTOLIC VOLUME: 85.51 ML
LEFT VENTRICLE MASS INDEX: 60 G/M2
LEFT VENTRICLE SYSTOLIC VOLUME INDEX: 34.8 ML/M2
LEFT VENTRICLE SYSTOLIC VOLUME: 80.96 ML
LEFT VENTRICULAR INTERNAL DIMENSION IN DIASTOLE: 4.35 CM (ref 3.5–6)
LEFT VENTRICULAR MASS: 139.17 G
LV LATERAL E/E' RATIO: 8.5 M/S
LV SEPTAL E/E' RATIO: 11.33 M/S
LYMPHOCYTES # BLD AUTO: 2.7 K/UL (ref 1–4.8)
LYMPHOCYTES # BLD AUTO: 2.7 K/UL (ref 1–4.8)
LYMPHOCYTES NFR BLD: 24.6 % (ref 18–48)
LYMPHOCYTES NFR BLD: 24.6 % (ref 18–48)
MAGNESIUM SERPL-MCNC: 1.9 MG/DL (ref 1.6–2.6)
MAGNESIUM SERPL-MCNC: 1.9 MG/DL (ref 1.6–2.6)
MCH RBC QN AUTO: 30.2 PG (ref 27–31)
MCH RBC QN AUTO: 30.2 PG (ref 27–31)
MCHC RBC AUTO-ENTMCNC: 31.9 G/DL (ref 32–36)
MCHC RBC AUTO-ENTMCNC: 31.9 G/DL (ref 32–36)
MCV RBC AUTO: 95 FL (ref 82–98)
MCV RBC AUTO: 95 FL (ref 82–98)
MODE: ABNORMAL
MONOCYTES # BLD AUTO: 1.3 K/UL (ref 0.3–1)
MONOCYTES # BLD AUTO: 1.3 K/UL (ref 0.3–1)
MONOCYTES NFR BLD: 11.7 % (ref 4–15)
MONOCYTES NFR BLD: 11.7 % (ref 4–15)
MV PEAK A VEL: 0.5 M/S
MV PEAK E VEL: 0.68 M/S
NEUTROPHILS # BLD AUTO: 6.5 K/UL (ref 1.8–7.7)
NEUTROPHILS # BLD AUTO: 6.5 K/UL (ref 1.8–7.7)
NEUTROPHILS NFR BLD: 59.9 % (ref 38–73)
NEUTROPHILS NFR BLD: 59.9 % (ref 38–73)
NRBC BLD-RTO: 0 /100 WBC
NRBC BLD-RTO: 0 /100 WBC
PCO2 BLDA: 36.8 MMHG (ref 35–45)
PH SMN: 7.39 [PH] (ref 7.35–7.45)
PHOSPHATE SERPL-MCNC: 3.9 MG/DL (ref 2.7–4.5)
PHOSPHATE SERPL-MCNC: 3.9 MG/DL (ref 2.7–4.5)
PLATELET # BLD AUTO: 236 K/UL (ref 150–350)
PLATELET # BLD AUTO: 236 K/UL (ref 150–350)
PMV BLD AUTO: 11.2 FL (ref 9.2–12.9)
PMV BLD AUTO: 11.2 FL (ref 9.2–12.9)
PO2 BLDA: 33 MMHG (ref 40–60)
POC BE: -2 MMOL/L
POC SATURATED O2: 64 % (ref 95–100)
POC TCO2: 24 MMOL/L (ref 24–29)
POCT GLUCOSE: 146 MG/DL (ref 70–110)
POCT GLUCOSE: 147 MG/DL (ref 70–110)
POCT GLUCOSE: 154 MG/DL (ref 70–110)
POCT GLUCOSE: 188 MG/DL (ref 70–110)
POTASSIUM SERPL-SCNC: 4.2 MMOL/L (ref 3.5–5.1)
POTASSIUM SERPL-SCNC: 4.2 MMOL/L (ref 3.5–5.1)
PREALB SERPL-MCNC: 7 MG/DL (ref 20–43)
PROT SERPL-MCNC: 6.9 G/DL (ref 6–8.4)
PROT SERPL-MCNC: 6.9 G/DL (ref 6–8.4)
PROTHROMBIN TIME: 12.4 SEC (ref 9–12.5)
RA PRESSURE: 8 MMHG
RBC # BLD AUTO: 2.48 M/UL (ref 4.6–6.2)
RBC # BLD AUTO: 2.48 M/UL (ref 4.6–6.2)
RV TISSUE DOPPLER FREE WALL SYSTOLIC VELOCITY 1 (APICAL 4 CHAMBER VIEW): 3.84 CM/S
SAMPLE: ABNORMAL
SINUS: 3.81 CM
SITE: ABNORMAL
SODIUM SERPL-SCNC: 129 MMOL/L (ref 136–145)
SODIUM SERPL-SCNC: 129 MMOL/L (ref 136–145)
STJ: 3.39 CM
TDI LATERAL: 0.08 M/S
TDI SEPTAL: 0.06 M/S
TDI: 0.07 M/S
WBC # BLD AUTO: 10.88 K/UL (ref 3.9–12.7)
WBC # BLD AUTO: 10.88 K/UL (ref 3.9–12.7)

## 2021-01-22 PROCEDURE — 83880 ASSAY OF NATRIURETIC PEPTIDE: CPT

## 2021-01-22 PROCEDURE — 25000003 PHARM REV CODE 250: Performed by: STUDENT IN AN ORGANIZED HEALTH CARE EDUCATION/TRAINING PROGRAM

## 2021-01-22 PROCEDURE — 27000248 HC VAD-ADDITIONAL DAY

## 2021-01-22 PROCEDURE — 20600001 HC STEP DOWN PRIVATE ROOM

## 2021-01-22 PROCEDURE — 85610 PROTHROMBIN TIME: CPT

## 2021-01-22 PROCEDURE — 83615 LACTATE (LD) (LDH) ENZYME: CPT

## 2021-01-22 PROCEDURE — 85025 COMPLETE CBC W/AUTO DIFF WBC: CPT

## 2021-01-22 PROCEDURE — 25000003 PHARM REV CODE 250: Performed by: INTERNAL MEDICINE

## 2021-01-22 PROCEDURE — A4216 STERILE WATER/SALINE, 10 ML: HCPCS | Performed by: STUDENT IN AN ORGANIZED HEALTH CARE EDUCATION/TRAINING PROGRAM

## 2021-01-22 PROCEDURE — 99233 PR SUBSEQUENT HOSPITAL CARE,LEVL III: ICD-10-PCS | Mod: 24,25,, | Performed by: THORACIC SURGERY (CARDIOTHORACIC VASCULAR SURGERY)

## 2021-01-22 PROCEDURE — 85730 THROMBOPLASTIN TIME PARTIAL: CPT

## 2021-01-22 PROCEDURE — 84134 ASSAY OF PREALBUMIN: CPT

## 2021-01-22 PROCEDURE — 97535 SELF CARE MNGMENT TRAINING: CPT

## 2021-01-22 PROCEDURE — 82803 BLOOD GASES ANY COMBINATION: CPT

## 2021-01-22 PROCEDURE — 99900035 HC TECH TIME PER 15 MIN (STAT)

## 2021-01-22 PROCEDURE — 63600175 PHARM REV CODE 636 W HCPCS: Performed by: STUDENT IN AN ORGANIZED HEALTH CARE EDUCATION/TRAINING PROGRAM

## 2021-01-22 PROCEDURE — 80048 BASIC METABOLIC PNL TOTAL CA: CPT

## 2021-01-22 PROCEDURE — 93750 INTERROGATION VAD IN PERSON: CPT | Mod: 77,,, | Performed by: THORACIC SURGERY (CARDIOTHORACIC VASCULAR SURGERY)

## 2021-01-22 PROCEDURE — 63600175 PHARM REV CODE 636 W HCPCS: Performed by: INTERNAL MEDICINE

## 2021-01-22 PROCEDURE — 84100 ASSAY OF PHOSPHORUS: CPT

## 2021-01-22 PROCEDURE — 93750 PR INTERROGATE VENT ASSIST DEV, IN PERSON, W PHYSICIAN ANALYSIS: ICD-10-PCS | Mod: 77,,, | Performed by: THORACIC SURGERY (CARDIOTHORACIC VASCULAR SURGERY)

## 2021-01-22 PROCEDURE — C9113 INJ PANTOPRAZOLE SODIUM, VIA: HCPCS | Performed by: STUDENT IN AN ORGANIZED HEALTH CARE EDUCATION/TRAINING PROGRAM

## 2021-01-22 PROCEDURE — 27000685 HC LVAD KIT (30 DAY SUPPLY)

## 2021-01-22 PROCEDURE — 99233 SBSQ HOSP IP/OBS HIGH 50: CPT | Mod: 24,25,, | Performed by: THORACIC SURGERY (CARDIOTHORACIC VASCULAR SURGERY)

## 2021-01-22 PROCEDURE — 86140 C-REACTIVE PROTEIN: CPT

## 2021-01-22 PROCEDURE — 80076 HEPATIC FUNCTION PANEL: CPT

## 2021-01-22 PROCEDURE — 83735 ASSAY OF MAGNESIUM: CPT

## 2021-01-22 PROCEDURE — 25000003 PHARM REV CODE 250: Performed by: HOSPITALIST

## 2021-01-22 RX ORDER — IBUPROFEN 200 MG
24 TABLET ORAL
Status: DISCONTINUED | OUTPATIENT
Start: 2021-01-22 | End: 2021-02-04 | Stop reason: HOSPADM

## 2021-01-22 RX ORDER — FUROSEMIDE 10 MG/ML
80 INJECTION INTRAMUSCULAR; INTRAVENOUS ONCE
Status: COMPLETED | OUTPATIENT
Start: 2021-01-22 | End: 2021-01-22

## 2021-01-22 RX ORDER — INSULIN ASPART 100 [IU]/ML
0-5 INJECTION, SOLUTION INTRAVENOUS; SUBCUTANEOUS
Status: DISCONTINUED | OUTPATIENT
Start: 2021-01-22 | End: 2021-02-04 | Stop reason: HOSPADM

## 2021-01-22 RX ORDER — AMIODARONE HYDROCHLORIDE 200 MG/1
200 TABLET ORAL DAILY
Status: DISCONTINUED | OUTPATIENT
Start: 2021-01-22 | End: 2021-02-04 | Stop reason: HOSPADM

## 2021-01-22 RX ORDER — GLUCAGON 1 MG
1 KIT INJECTION
Status: DISCONTINUED | OUTPATIENT
Start: 2021-01-22 | End: 2021-02-04 | Stop reason: HOSPADM

## 2021-01-22 RX ORDER — IBUPROFEN 200 MG
16 TABLET ORAL
Status: DISCONTINUED | OUTPATIENT
Start: 2021-01-22 | End: 2021-02-04 | Stop reason: HOSPADM

## 2021-01-22 RX ADMIN — WARFARIN SODIUM 6 MG: 5 TABLET ORAL at 06:01

## 2021-01-22 RX ADMIN — INSULIN ASPART 4 UNITS: 100 INJECTION, SOLUTION INTRAVENOUS; SUBCUTANEOUS at 07:01

## 2021-01-22 RX ADMIN — POTASSIUM CHLORIDE 20 MEQ: 20 TABLET, EXTENDED RELEASE ORAL at 09:01

## 2021-01-22 RX ADMIN — AMIODARONE HYDROCHLORIDE 0.5 MG/MIN: 1.8 INJECTION, SOLUTION INTRAVENOUS at 08:01

## 2021-01-22 RX ADMIN — Medication 400 MG: at 09:01

## 2021-01-22 RX ADMIN — Medication 10 ML: at 06:01

## 2021-01-22 RX ADMIN — MAGNESIUM SULFATE 2 G: 2 INJECTION INTRAVENOUS at 05:01

## 2021-01-22 RX ADMIN — FUROSEMIDE 80 MG: 10 INJECTION, SOLUTION INTRAMUSCULAR; INTRAVENOUS at 06:01

## 2021-01-22 RX ADMIN — Medication 400 MG: at 03:01

## 2021-01-22 RX ADMIN — Medication 10 ML: at 12:01

## 2021-01-22 RX ADMIN — ATORVASTATIN CALCIUM 80 MG: 20 TABLET, FILM COATED ORAL at 09:01

## 2021-01-22 RX ADMIN — ASPIRIN 81 MG: 81 TABLET, COATED ORAL at 09:01

## 2021-01-22 RX ADMIN — POTASSIUM CHLORIDE 20 MEQ: 20 TABLET, EXTENDED RELEASE ORAL at 08:01

## 2021-01-22 RX ADMIN — AMIODARONE HYDROCHLORIDE 200 MG: 200 TABLET ORAL at 12:01

## 2021-01-22 RX ADMIN — Medication 400 MG: at 08:01

## 2021-01-22 RX ADMIN — PANTOPRAZOLE SODIUM 40 MG: 40 INJECTION, POWDER, FOR SOLUTION INTRAVENOUS at 09:01

## 2021-01-22 RX ADMIN — DOCUSATE SODIUM 200 MG: 50 CAPSULE, LIQUID FILLED ORAL at 08:01

## 2021-01-22 RX ADMIN — INSULIN ASPART 4 UNITS: 100 INJECTION, SOLUTION INTRAVENOUS; SUBCUTANEOUS at 01:01

## 2021-01-23 LAB
ALLENS TEST: ABNORMAL
ANION GAP SERPL CALC-SCNC: 11 MMOL/L (ref 8–16)
ANION GAP SERPL CALC-SCNC: 11 MMOL/L (ref 8–16)
APTT BLDCRRT: 36.2 SEC (ref 21–32)
APTT BLDCRRT: 47.4 SEC (ref 21–32)
BASOPHILS # BLD AUTO: 0.04 K/UL (ref 0–0.2)
BASOPHILS # BLD AUTO: 0.04 K/UL (ref 0–0.2)
BASOPHILS NFR BLD: 0.3 % (ref 0–1.9)
BASOPHILS NFR BLD: 0.3 % (ref 0–1.9)
BUN SERPL-MCNC: 25 MG/DL (ref 8–23)
BUN SERPL-MCNC: 25 MG/DL (ref 8–23)
CALCIUM SERPL-MCNC: 8.8 MG/DL (ref 8.7–10.5)
CALCIUM SERPL-MCNC: 8.8 MG/DL (ref 8.7–10.5)
CHLORIDE SERPL-SCNC: 103 MMOL/L (ref 95–110)
CHLORIDE SERPL-SCNC: 103 MMOL/L (ref 95–110)
CO2 SERPL-SCNC: 21 MMOL/L (ref 23–29)
CO2 SERPL-SCNC: 21 MMOL/L (ref 23–29)
CREAT SERPL-MCNC: 1.1 MG/DL (ref 0.5–1.4)
CREAT SERPL-MCNC: 1.1 MG/DL (ref 0.5–1.4)
DIFFERENTIAL METHOD: ABNORMAL
DIFFERENTIAL METHOD: ABNORMAL
EOSINOPHIL # BLD AUTO: 0.2 K/UL (ref 0–0.5)
EOSINOPHIL # BLD AUTO: 0.2 K/UL (ref 0–0.5)
EOSINOPHIL NFR BLD: 2 % (ref 0–8)
EOSINOPHIL NFR BLD: 2 % (ref 0–8)
ERYTHROCYTE [DISTWIDTH] IN BLOOD BY AUTOMATED COUNT: 14.5 % (ref 11.5–14.5)
ERYTHROCYTE [DISTWIDTH] IN BLOOD BY AUTOMATED COUNT: 14.5 % (ref 11.5–14.5)
EST. GFR  (AFRICAN AMERICAN): >60 ML/MIN/1.73 M^2
EST. GFR  (AFRICAN AMERICAN): >60 ML/MIN/1.73 M^2
EST. GFR  (NON AFRICAN AMERICAN): >60 ML/MIN/1.73 M^2
EST. GFR  (NON AFRICAN AMERICAN): >60 ML/MIN/1.73 M^2
GLUCOSE SERPL-MCNC: 125 MG/DL (ref 70–110)
GLUCOSE SERPL-MCNC: 125 MG/DL (ref 70–110)
HCO3 UR-SCNC: 22.7 MMOL/L (ref 24–28)
HCT VFR BLD AUTO: 25.1 % (ref 40–54)
HCT VFR BLD AUTO: 25.1 % (ref 40–54)
HGB BLD-MCNC: 7.9 G/DL (ref 14–18)
HGB BLD-MCNC: 7.9 G/DL (ref 14–18)
IMM GRANULOCYTES # BLD AUTO: 0.12 K/UL (ref 0–0.04)
IMM GRANULOCYTES # BLD AUTO: 0.12 K/UL (ref 0–0.04)
IMM GRANULOCYTES NFR BLD AUTO: 1 % (ref 0–0.5)
IMM GRANULOCYTES NFR BLD AUTO: 1 % (ref 0–0.5)
INR PPP: 1.2 (ref 0.8–1.2)
LDH SERPL L TO P-CCNC: 258 U/L (ref 110–260)
LDH SERPL L TO P-CCNC: 258 U/L (ref 110–260)
LYMPHOCYTES # BLD AUTO: 2.3 K/UL (ref 1–4.8)
LYMPHOCYTES # BLD AUTO: 2.3 K/UL (ref 1–4.8)
LYMPHOCYTES NFR BLD: 19.1 % (ref 18–48)
LYMPHOCYTES NFR BLD: 19.1 % (ref 18–48)
MAGNESIUM SERPL-MCNC: 2.1 MG/DL (ref 1.6–2.6)
MAGNESIUM SERPL-MCNC: 2.1 MG/DL (ref 1.6–2.6)
MCH RBC QN AUTO: 29.8 PG (ref 27–31)
MCH RBC QN AUTO: 29.8 PG (ref 27–31)
MCHC RBC AUTO-ENTMCNC: 31.5 G/DL (ref 32–36)
MCHC RBC AUTO-ENTMCNC: 31.5 G/DL (ref 32–36)
MCV RBC AUTO: 95 FL (ref 82–98)
MCV RBC AUTO: 95 FL (ref 82–98)
MONOCYTES # BLD AUTO: 1.4 K/UL (ref 0.3–1)
MONOCYTES # BLD AUTO: 1.4 K/UL (ref 0.3–1)
MONOCYTES NFR BLD: 11.8 % (ref 4–15)
MONOCYTES NFR BLD: 11.8 % (ref 4–15)
NEUTROPHILS # BLD AUTO: 8 K/UL (ref 1.8–7.7)
NEUTROPHILS # BLD AUTO: 8 K/UL (ref 1.8–7.7)
NEUTROPHILS NFR BLD: 65.8 % (ref 38–73)
NEUTROPHILS NFR BLD: 65.8 % (ref 38–73)
NRBC BLD-RTO: 0 /100 WBC
NRBC BLD-RTO: 0 /100 WBC
PCO2 BLDA: 34.6 MMHG (ref 35–45)
PH SMN: 7.42 [PH] (ref 7.35–7.45)
PHOSPHATE SERPL-MCNC: 3.5 MG/DL (ref 2.7–4.5)
PHOSPHATE SERPL-MCNC: 3.5 MG/DL (ref 2.7–4.5)
PLATELET # BLD AUTO: 284 K/UL (ref 150–350)
PLATELET # BLD AUTO: 284 K/UL (ref 150–350)
PMV BLD AUTO: 10.7 FL (ref 9.2–12.9)
PMV BLD AUTO: 10.7 FL (ref 9.2–12.9)
PO2 BLDA: 33 MMHG (ref 40–60)
POC BE: -2 MMOL/L
POC SATURATED O2: 65 % (ref 95–100)
POC TCO2: 24 MMOL/L (ref 24–29)
POCT GLUCOSE: 132 MG/DL (ref 70–110)
POCT GLUCOSE: 134 MG/DL (ref 70–110)
POCT GLUCOSE: 146 MG/DL (ref 70–110)
POCT GLUCOSE: 169 MG/DL (ref 70–110)
POTASSIUM SERPL-SCNC: 4.2 MMOL/L (ref 3.5–5.1)
POTASSIUM SERPL-SCNC: 4.2 MMOL/L (ref 3.5–5.1)
PROTHROMBIN TIME: 13 SEC (ref 9–12.5)
RBC # BLD AUTO: 2.65 M/UL (ref 4.6–6.2)
RBC # BLD AUTO: 2.65 M/UL (ref 4.6–6.2)
SAMPLE: ABNORMAL
SITE: ABNORMAL
SODIUM SERPL-SCNC: 135 MMOL/L (ref 136–145)
SODIUM SERPL-SCNC: 135 MMOL/L (ref 136–145)
WBC # BLD AUTO: 12.22 K/UL (ref 3.9–12.7)
WBC # BLD AUTO: 12.22 K/UL (ref 3.9–12.7)

## 2021-01-23 PROCEDURE — 25000003 PHARM REV CODE 250: Performed by: INTERNAL MEDICINE

## 2021-01-23 PROCEDURE — 25000003 PHARM REV CODE 250: Performed by: HOSPITALIST

## 2021-01-23 PROCEDURE — 83615 LACTATE (LD) (LDH) ENZYME: CPT

## 2021-01-23 PROCEDURE — 63600175 PHARM REV CODE 636 W HCPCS: Performed by: STUDENT IN AN ORGANIZED HEALTH CARE EDUCATION/TRAINING PROGRAM

## 2021-01-23 PROCEDURE — 25000003 PHARM REV CODE 250: Performed by: STUDENT IN AN ORGANIZED HEALTH CARE EDUCATION/TRAINING PROGRAM

## 2021-01-23 PROCEDURE — 85730 THROMBOPLASTIN TIME PARTIAL: CPT

## 2021-01-23 PROCEDURE — 94761 N-INVAS EAR/PLS OXIMETRY MLT: CPT

## 2021-01-23 PROCEDURE — 99233 PR SUBSEQUENT HOSPITAL CARE,LEVL III: ICD-10-PCS | Mod: 25,,, | Performed by: INTERNAL MEDICINE

## 2021-01-23 PROCEDURE — 85610 PROTHROMBIN TIME: CPT

## 2021-01-23 PROCEDURE — A4216 STERILE WATER/SALINE, 10 ML: HCPCS | Performed by: STUDENT IN AN ORGANIZED HEALTH CARE EDUCATION/TRAINING PROGRAM

## 2021-01-23 PROCEDURE — 93750 PR INTERROGATE VENT ASSIST DEV, IN PERSON, W PHYSICIAN ANALYSIS: ICD-10-PCS | Mod: ,,, | Performed by: INTERNAL MEDICINE

## 2021-01-23 PROCEDURE — 99233 SBSQ HOSP IP/OBS HIGH 50: CPT | Mod: 25,,, | Performed by: INTERNAL MEDICINE

## 2021-01-23 PROCEDURE — 84100 ASSAY OF PHOSPHORUS: CPT

## 2021-01-23 PROCEDURE — 27000248 HC VAD-ADDITIONAL DAY

## 2021-01-23 PROCEDURE — 83735 ASSAY OF MAGNESIUM: CPT

## 2021-01-23 PROCEDURE — 85730 THROMBOPLASTIN TIME PARTIAL: CPT | Mod: 91

## 2021-01-23 PROCEDURE — 27000685 HC LVAD KIT (30 DAY SUPPLY)

## 2021-01-23 PROCEDURE — 85025 COMPLETE CBC W/AUTO DIFF WBC: CPT

## 2021-01-23 PROCEDURE — 99900035 HC TECH TIME PER 15 MIN (STAT)

## 2021-01-23 PROCEDURE — 82803 BLOOD GASES ANY COMBINATION: CPT

## 2021-01-23 PROCEDURE — 97530 THERAPEUTIC ACTIVITIES: CPT

## 2021-01-23 PROCEDURE — 93750 INTERROGATION VAD IN PERSON: CPT | Mod: ,,, | Performed by: INTERNAL MEDICINE

## 2021-01-23 PROCEDURE — 20600001 HC STEP DOWN PRIVATE ROOM

## 2021-01-23 PROCEDURE — 80048 BASIC METABOLIC PNL TOTAL CA: CPT

## 2021-01-23 RX ADMIN — Medication 10 ML: at 12:01

## 2021-01-23 RX ADMIN — POTASSIUM CHLORIDE 20 MEQ: 20 TABLET, EXTENDED RELEASE ORAL at 09:01

## 2021-01-23 RX ADMIN — ATORVASTATIN CALCIUM 80 MG: 20 TABLET, FILM COATED ORAL at 08:01

## 2021-01-23 RX ADMIN — HEPARIN SODIUM AND DEXTROSE 14 UNITS/KG/HR: 10000; 5 INJECTION INTRAVENOUS at 10:01

## 2021-01-23 RX ADMIN — Medication 400 MG: at 04:01

## 2021-01-23 RX ADMIN — HEPARIN SODIUM AND DEXTROSE 13 UNITS/KG/HR: 10000; 5 INJECTION INTRAVENOUS at 01:01

## 2021-01-23 RX ADMIN — POTASSIUM CHLORIDE 20 MEQ: 20 TABLET, EXTENDED RELEASE ORAL at 08:01

## 2021-01-23 RX ADMIN — POLYETHYLENE GLYCOL 3350 17 G: 17 POWDER, FOR SOLUTION ORAL at 08:01

## 2021-01-23 RX ADMIN — WARFARIN SODIUM 8 MG: 5 TABLET ORAL at 04:01

## 2021-01-23 RX ADMIN — Medication 400 MG: at 09:01

## 2021-01-23 RX ADMIN — Medication 10 ML: at 06:01

## 2021-01-23 RX ADMIN — INSULIN ASPART 4 UNITS: 100 INJECTION, SOLUTION INTRAVENOUS; SUBCUTANEOUS at 08:01

## 2021-01-23 RX ADMIN — Medication 400 MG: at 08:01

## 2021-01-23 RX ADMIN — ASPIRIN 81 MG: 81 TABLET, COATED ORAL at 08:01

## 2021-01-23 RX ADMIN — INSULIN ASPART 4 UNITS: 100 INJECTION, SOLUTION INTRAVENOUS; SUBCUTANEOUS at 11:01

## 2021-01-23 RX ADMIN — AMIODARONE HYDROCHLORIDE 200 MG: 200 TABLET ORAL at 08:01

## 2021-01-23 RX ADMIN — INSULIN ASPART 4 UNITS: 100 INJECTION, SOLUTION INTRAVENOUS; SUBCUTANEOUS at 05:01

## 2021-01-24 LAB
ALLENS TEST: ABNORMAL
ANION GAP SERPL CALC-SCNC: 9 MMOL/L (ref 8–16)
ANION GAP SERPL CALC-SCNC: 9 MMOL/L (ref 8–16)
APTT BLDCRRT: 45 SEC (ref 21–32)
APTT BLDCRRT: 58.4 SEC (ref 21–32)
APTT BLDCRRT: 58.4 SEC (ref 21–32)
BASOPHILS # BLD AUTO: 0.04 K/UL (ref 0–0.2)
BASOPHILS # BLD AUTO: 0.04 K/UL (ref 0–0.2)
BASOPHILS NFR BLD: 0.3 % (ref 0–1.9)
BASOPHILS NFR BLD: 0.3 % (ref 0–1.9)
BUN SERPL-MCNC: 25 MG/DL (ref 8–23)
BUN SERPL-MCNC: 25 MG/DL (ref 8–23)
CALCIUM SERPL-MCNC: 8.5 MG/DL (ref 8.7–10.5)
CALCIUM SERPL-MCNC: 8.5 MG/DL (ref 8.7–10.5)
CHLORIDE SERPL-SCNC: 104 MMOL/L (ref 95–110)
CHLORIDE SERPL-SCNC: 104 MMOL/L (ref 95–110)
CO2 SERPL-SCNC: 22 MMOL/L (ref 23–29)
CO2 SERPL-SCNC: 22 MMOL/L (ref 23–29)
CREAT SERPL-MCNC: 1 MG/DL (ref 0.5–1.4)
CREAT SERPL-MCNC: 1 MG/DL (ref 0.5–1.4)
DIFFERENTIAL METHOD: ABNORMAL
DIFFERENTIAL METHOD: ABNORMAL
EOSINOPHIL # BLD AUTO: 0.4 K/UL (ref 0–0.5)
EOSINOPHIL # BLD AUTO: 0.4 K/UL (ref 0–0.5)
EOSINOPHIL NFR BLD: 2.9 % (ref 0–8)
EOSINOPHIL NFR BLD: 2.9 % (ref 0–8)
ERYTHROCYTE [DISTWIDTH] IN BLOOD BY AUTOMATED COUNT: 14.5 % (ref 11.5–14.5)
ERYTHROCYTE [DISTWIDTH] IN BLOOD BY AUTOMATED COUNT: 14.5 % (ref 11.5–14.5)
EST. GFR  (AFRICAN AMERICAN): >60 ML/MIN/1.73 M^2
EST. GFR  (AFRICAN AMERICAN): >60 ML/MIN/1.73 M^2
EST. GFR  (NON AFRICAN AMERICAN): >60 ML/MIN/1.73 M^2
EST. GFR  (NON AFRICAN AMERICAN): >60 ML/MIN/1.73 M^2
GLUCOSE SERPL-MCNC: 124 MG/DL (ref 70–110)
GLUCOSE SERPL-MCNC: 124 MG/DL (ref 70–110)
HCO3 UR-SCNC: 23.1 MMOL/L (ref 24–28)
HCT VFR BLD AUTO: 24.7 % (ref 40–54)
HCT VFR BLD AUTO: 24.7 % (ref 40–54)
HGB BLD-MCNC: 7.8 G/DL (ref 14–18)
HGB BLD-MCNC: 7.8 G/DL (ref 14–18)
IMM GRANULOCYTES # BLD AUTO: 0.1 K/UL (ref 0–0.04)
IMM GRANULOCYTES # BLD AUTO: 0.1 K/UL (ref 0–0.04)
IMM GRANULOCYTES NFR BLD AUTO: 0.8 % (ref 0–0.5)
IMM GRANULOCYTES NFR BLD AUTO: 0.8 % (ref 0–0.5)
INR PPP: 1.4 (ref 0.8–1.2)
INR PPP: 1.4 (ref 0.8–1.2)
LDH SERPL L TO P-CCNC: 262 U/L (ref 110–260)
LDH SERPL L TO P-CCNC: 262 U/L (ref 110–260)
LYMPHOCYTES # BLD AUTO: 2.4 K/UL (ref 1–4.8)
LYMPHOCYTES # BLD AUTO: 2.4 K/UL (ref 1–4.8)
LYMPHOCYTES NFR BLD: 20 % (ref 18–48)
LYMPHOCYTES NFR BLD: 20 % (ref 18–48)
MAGNESIUM SERPL-MCNC: 2.1 MG/DL (ref 1.6–2.6)
MAGNESIUM SERPL-MCNC: 2.1 MG/DL (ref 1.6–2.6)
MCH RBC QN AUTO: 30.4 PG (ref 27–31)
MCH RBC QN AUTO: 30.4 PG (ref 27–31)
MCHC RBC AUTO-ENTMCNC: 31.6 G/DL (ref 32–36)
MCHC RBC AUTO-ENTMCNC: 31.6 G/DL (ref 32–36)
MCV RBC AUTO: 96 FL (ref 82–98)
MCV RBC AUTO: 96 FL (ref 82–98)
MONOCYTES # BLD AUTO: 1.3 K/UL (ref 0.3–1)
MONOCYTES # BLD AUTO: 1.3 K/UL (ref 0.3–1)
MONOCYTES NFR BLD: 10.6 % (ref 4–15)
MONOCYTES NFR BLD: 10.6 % (ref 4–15)
NEUTROPHILS # BLD AUTO: 7.9 K/UL (ref 1.8–7.7)
NEUTROPHILS # BLD AUTO: 7.9 K/UL (ref 1.8–7.7)
NEUTROPHILS NFR BLD: 65.4 % (ref 38–73)
NEUTROPHILS NFR BLD: 65.4 % (ref 38–73)
NRBC BLD-RTO: 0 /100 WBC
NRBC BLD-RTO: 0 /100 WBC
PCO2 BLDA: 35 MMHG (ref 35–45)
PH SMN: 7.43 [PH] (ref 7.35–7.45)
PHOSPHATE SERPL-MCNC: 3.4 MG/DL (ref 2.7–4.5)
PHOSPHATE SERPL-MCNC: 3.4 MG/DL (ref 2.7–4.5)
PLATELET # BLD AUTO: 267 K/UL (ref 150–350)
PLATELET # BLD AUTO: 267 K/UL (ref 150–350)
PMV BLD AUTO: 10.8 FL (ref 9.2–12.9)
PMV BLD AUTO: 10.8 FL (ref 9.2–12.9)
PO2 BLDA: 30 MMHG (ref 40–60)
POC BE: -1 MMOL/L
POC SATURATED O2: 60 % (ref 95–100)
POC TCO2: 24 MMOL/L (ref 24–29)
POCT GLUCOSE: 126 MG/DL (ref 70–110)
POCT GLUCOSE: 129 MG/DL (ref 70–110)
POCT GLUCOSE: 146 MG/DL (ref 70–110)
POCT GLUCOSE: 148 MG/DL (ref 70–110)
POTASSIUM SERPL-SCNC: 4.5 MMOL/L (ref 3.5–5.1)
POTASSIUM SERPL-SCNC: 4.5 MMOL/L (ref 3.5–5.1)
PROTHROMBIN TIME: 14.6 SEC (ref 9–12.5)
PROTHROMBIN TIME: 14.6 SEC (ref 9–12.5)
RBC # BLD AUTO: 2.57 M/UL (ref 4.6–6.2)
RBC # BLD AUTO: 2.57 M/UL (ref 4.6–6.2)
SAMPLE: ABNORMAL
SITE: ABNORMAL
SODIUM SERPL-SCNC: 135 MMOL/L (ref 136–145)
SODIUM SERPL-SCNC: 135 MMOL/L (ref 136–145)
WBC # BLD AUTO: 12.13 K/UL (ref 3.9–12.7)
WBC # BLD AUTO: 12.13 K/UL (ref 3.9–12.7)

## 2021-01-24 PROCEDURE — 25000003 PHARM REV CODE 250: Performed by: INTERNAL MEDICINE

## 2021-01-24 PROCEDURE — 85610 PROTHROMBIN TIME: CPT

## 2021-01-24 PROCEDURE — 25000003 PHARM REV CODE 250: Performed by: HOSPITALIST

## 2021-01-24 PROCEDURE — 83735 ASSAY OF MAGNESIUM: CPT

## 2021-01-24 PROCEDURE — 93750 PR INTERROGATE VENT ASSIST DEV, IN PERSON, W PHYSICIAN ANALYSIS: ICD-10-PCS | Mod: ,,, | Performed by: INTERNAL MEDICINE

## 2021-01-24 PROCEDURE — 25000003 PHARM REV CODE 250: Performed by: STUDENT IN AN ORGANIZED HEALTH CARE EDUCATION/TRAINING PROGRAM

## 2021-01-24 PROCEDURE — 27000248 HC VAD-ADDITIONAL DAY

## 2021-01-24 PROCEDURE — 82803 BLOOD GASES ANY COMBINATION: CPT

## 2021-01-24 PROCEDURE — 85025 COMPLETE CBC W/AUTO DIFF WBC: CPT

## 2021-01-24 PROCEDURE — 27000685 HC LVAD KIT (30 DAY SUPPLY)

## 2021-01-24 PROCEDURE — A4216 STERILE WATER/SALINE, 10 ML: HCPCS | Performed by: STUDENT IN AN ORGANIZED HEALTH CARE EDUCATION/TRAINING PROGRAM

## 2021-01-24 PROCEDURE — 82800 BLOOD PH: CPT

## 2021-01-24 PROCEDURE — 85730 THROMBOPLASTIN TIME PARTIAL: CPT | Mod: 91

## 2021-01-24 PROCEDURE — 85730 THROMBOPLASTIN TIME PARTIAL: CPT

## 2021-01-24 PROCEDURE — 20600001 HC STEP DOWN PRIVATE ROOM

## 2021-01-24 PROCEDURE — 99233 SBSQ HOSP IP/OBS HIGH 50: CPT | Mod: 25,,, | Performed by: INTERNAL MEDICINE

## 2021-01-24 PROCEDURE — 93750 INTERROGATION VAD IN PERSON: CPT | Mod: ,,, | Performed by: INTERNAL MEDICINE

## 2021-01-24 PROCEDURE — 99233 PR SUBSEQUENT HOSPITAL CARE,LEVL III: ICD-10-PCS | Mod: 25,,, | Performed by: INTERNAL MEDICINE

## 2021-01-24 PROCEDURE — 63600175 PHARM REV CODE 636 W HCPCS: Performed by: STUDENT IN AN ORGANIZED HEALTH CARE EDUCATION/TRAINING PROGRAM

## 2021-01-24 PROCEDURE — 83615 LACTATE (LD) (LDH) ENZYME: CPT

## 2021-01-24 PROCEDURE — 80048 BASIC METABOLIC PNL TOTAL CA: CPT

## 2021-01-24 PROCEDURE — 84100 ASSAY OF PHOSPHORUS: CPT

## 2021-01-24 PROCEDURE — 99900035 HC TECH TIME PER 15 MIN (STAT)

## 2021-01-24 RX ADMIN — INSULIN ASPART 4 UNITS: 100 INJECTION, SOLUTION INTRAVENOUS; SUBCUTANEOUS at 04:01

## 2021-01-24 RX ADMIN — Medication 10 ML: at 12:01

## 2021-01-24 RX ADMIN — WARFARIN SODIUM 8 MG: 5 TABLET ORAL at 05:01

## 2021-01-24 RX ADMIN — HEPARIN SODIUM AND DEXTROSE 12 UNITS/KG/HR: 10000; 5 INJECTION INTRAVENOUS at 09:01

## 2021-01-24 RX ADMIN — Medication 400 MG: at 08:01

## 2021-01-24 RX ADMIN — Medication 400 MG: at 04:01

## 2021-01-24 RX ADMIN — HEPARIN SODIUM AND DEXTROSE 12 UNITS/KG/HR: 10000; 5 INJECTION INTRAVENOUS at 06:01

## 2021-01-24 RX ADMIN — Medication 400 MG: at 09:01

## 2021-01-24 RX ADMIN — INSULIN ASPART 4 UNITS: 100 INJECTION, SOLUTION INTRAVENOUS; SUBCUTANEOUS at 07:01

## 2021-01-24 RX ADMIN — ASPIRIN 81 MG: 81 TABLET, COATED ORAL at 09:01

## 2021-01-24 RX ADMIN — ATORVASTATIN CALCIUM 80 MG: 20 TABLET, FILM COATED ORAL at 09:01

## 2021-01-24 RX ADMIN — AMIODARONE HYDROCHLORIDE 200 MG: 200 TABLET ORAL at 09:01

## 2021-01-24 RX ADMIN — POTASSIUM CHLORIDE 20 MEQ: 20 TABLET, EXTENDED RELEASE ORAL at 08:01

## 2021-01-25 LAB
ALBUMIN SERPL BCP-MCNC: 2.8 G/DL (ref 3.5–5.2)
ALLENS TEST: ABNORMAL
ALLENS TEST: ABNORMAL
ALP SERPL-CCNC: 122 U/L (ref 55–135)
ALT SERPL W/O P-5'-P-CCNC: 9 U/L (ref 10–44)
ANION GAP SERPL CALC-SCNC: 8 MMOL/L (ref 8–16)
APTT BLDCRRT: 49 SEC (ref 21–32)
AST SERPL-CCNC: 17 U/L (ref 10–40)
BASOPHILS # BLD AUTO: 0.04 K/UL (ref 0–0.2)
BASOPHILS NFR BLD: 0.4 % (ref 0–1.9)
BILIRUB DIRECT SERPL-MCNC: 0.8 MG/DL (ref 0.1–0.3)
BILIRUB SERPL-MCNC: 1.3 MG/DL (ref 0.1–1)
BNP SERPL-MCNC: 407 PG/ML (ref 0–99)
BUN SERPL-MCNC: 19 MG/DL (ref 8–23)
CALCIUM SERPL-MCNC: 8.8 MG/DL (ref 8.7–10.5)
CHLORIDE SERPL-SCNC: 102 MMOL/L (ref 95–110)
CO2 SERPL-SCNC: 23 MMOL/L (ref 23–29)
CREAT SERPL-MCNC: 1.1 MG/DL (ref 0.5–1.4)
CRP SERPL-MCNC: 52.2 MG/L (ref 0–8.2)
DIFFERENTIAL METHOD: ABNORMAL
EOSINOPHIL # BLD AUTO: 0.3 K/UL (ref 0–0.5)
EOSINOPHIL NFR BLD: 2.7 % (ref 0–8)
ERYTHROCYTE [DISTWIDTH] IN BLOOD BY AUTOMATED COUNT: 14.6 % (ref 11.5–14.5)
EST. GFR  (AFRICAN AMERICAN): >60 ML/MIN/1.73 M^2
EST. GFR  (NON AFRICAN AMERICAN): >60 ML/MIN/1.73 M^2
GLUCOSE SERPL-MCNC: 116 MG/DL (ref 70–110)
HCO3 UR-SCNC: 22.2 MMOL/L (ref 24–28)
HCO3 UR-SCNC: 23 MMOL/L (ref 24–28)
HCT VFR BLD AUTO: 25.4 % (ref 40–54)
HGB BLD-MCNC: 7.9 G/DL (ref 14–18)
IMM GRANULOCYTES # BLD AUTO: 0.08 K/UL (ref 0–0.04)
IMM GRANULOCYTES NFR BLD AUTO: 0.8 % (ref 0–0.5)
INR PPP: 1.5 (ref 0.8–1.2)
LDH SERPL L TO P-CCNC: 297 U/L (ref 110–260)
LYMPHOCYTES # BLD AUTO: 2.1 K/UL (ref 1–4.8)
LYMPHOCYTES NFR BLD: 19.6 % (ref 18–48)
MAGNESIUM SERPL-MCNC: 1.8 MG/DL (ref 1.6–2.6)
MCH RBC QN AUTO: 29.8 PG (ref 27–31)
MCHC RBC AUTO-ENTMCNC: 31.1 G/DL (ref 32–36)
MCV RBC AUTO: 96 FL (ref 82–98)
MONOCYTES # BLD AUTO: 0.9 K/UL (ref 0.3–1)
MONOCYTES NFR BLD: 8.9 % (ref 4–15)
NEUTROPHILS # BLD AUTO: 7.1 K/UL (ref 1.8–7.7)
NEUTROPHILS NFR BLD: 67.6 % (ref 38–73)
NRBC BLD-RTO: 0 /100 WBC
PCO2 BLDA: 33.2 MMHG (ref 35–45)
PCO2 BLDA: 34.6 MMHG (ref 35–45)
PH SMN: 7.43 [PH] (ref 7.35–7.45)
PH SMN: 7.43 [PH] (ref 7.35–7.45)
PHOSPHATE SERPL-MCNC: 3.3 MG/DL (ref 2.7–4.5)
PLATELET # BLD AUTO: 297 K/UL (ref 150–350)
PMV BLD AUTO: 10.9 FL (ref 9.2–12.9)
PO2 BLDA: 24 MMHG (ref 40–60)
PO2 BLDA: 25 MMHG (ref 40–60)
POC BE: -1 MMOL/L
POC BE: -2 MMOL/L
POC SATURATED O2: 45 % (ref 95–100)
POC SATURATED O2: 48 % (ref 95–100)
POC TCO2: 23 MMOL/L (ref 24–29)
POC TCO2: 24 MMOL/L (ref 24–29)
POCT GLUCOSE: 103 MG/DL (ref 70–110)
POCT GLUCOSE: 117 MG/DL (ref 70–110)
POCT GLUCOSE: 135 MG/DL (ref 70–110)
POCT GLUCOSE: 152 MG/DL (ref 70–110)
POTASSIUM SERPL-SCNC: 4.3 MMOL/L (ref 3.5–5.1)
PREALB SERPL-MCNC: 8 MG/DL (ref 20–43)
PROT SERPL-MCNC: 8 G/DL (ref 6–8.4)
PROTHROMBIN TIME: 15.8 SEC (ref 9–12.5)
RBC # BLD AUTO: 2.65 M/UL (ref 4.6–6.2)
SAMPLE: ABNORMAL
SAMPLE: ABNORMAL
SITE: ABNORMAL
SITE: ABNORMAL
SODIUM SERPL-SCNC: 133 MMOL/L (ref 136–145)
WBC # BLD AUTO: 10.47 K/UL (ref 3.9–12.7)

## 2021-01-25 PROCEDURE — 97110 THERAPEUTIC EXERCISES: CPT

## 2021-01-25 PROCEDURE — 86140 C-REACTIVE PROTEIN: CPT

## 2021-01-25 PROCEDURE — 99232 SBSQ HOSP IP/OBS MODERATE 35: CPT | Mod: ,,, | Performed by: INTERNAL MEDICINE

## 2021-01-25 PROCEDURE — 85610 PROTHROMBIN TIME: CPT

## 2021-01-25 PROCEDURE — 25000003 PHARM REV CODE 250: Performed by: STUDENT IN AN ORGANIZED HEALTH CARE EDUCATION/TRAINING PROGRAM

## 2021-01-25 PROCEDURE — 25000003 PHARM REV CODE 250: Performed by: HOSPITALIST

## 2021-01-25 PROCEDURE — 80048 BASIC METABOLIC PNL TOTAL CA: CPT

## 2021-01-25 PROCEDURE — 93750 PR INTERROGATE VENT ASSIST DEV, IN PERSON, W PHYSICIAN ANALYSIS: ICD-10-PCS | Mod: ,,, | Performed by: INTERNAL MEDICINE

## 2021-01-25 PROCEDURE — 99231 PR SUBSEQUENT HOSPITAL CARE,LEVL I: ICD-10-PCS | Mod: ,,, | Performed by: NURSE PRACTITIONER

## 2021-01-25 PROCEDURE — 27000685 HC LVAD KIT (30 DAY SUPPLY)

## 2021-01-25 PROCEDURE — 93750 INTERROGATION VAD IN PERSON: CPT | Mod: ,,, | Performed by: INTERNAL MEDICINE

## 2021-01-25 PROCEDURE — 20600001 HC STEP DOWN PRIVATE ROOM

## 2021-01-25 PROCEDURE — 83615 LACTATE (LD) (LDH) ENZYME: CPT

## 2021-01-25 PROCEDURE — 82800 BLOOD PH: CPT

## 2021-01-25 PROCEDURE — 80076 HEPATIC FUNCTION PANEL: CPT

## 2021-01-25 PROCEDURE — 99232 PR SUBSEQUENT HOSPITAL CARE,LEVL II: ICD-10-PCS | Mod: ,,, | Performed by: INTERNAL MEDICINE

## 2021-01-25 PROCEDURE — 84134 ASSAY OF PREALBUMIN: CPT

## 2021-01-25 PROCEDURE — 85730 THROMBOPLASTIN TIME PARTIAL: CPT

## 2021-01-25 PROCEDURE — 25000003 PHARM REV CODE 250: Performed by: INTERNAL MEDICINE

## 2021-01-25 PROCEDURE — 63600175 PHARM REV CODE 636 W HCPCS: Mod: JG | Performed by: STUDENT IN AN ORGANIZED HEALTH CARE EDUCATION/TRAINING PROGRAM

## 2021-01-25 PROCEDURE — 83880 ASSAY OF NATRIURETIC PEPTIDE: CPT

## 2021-01-25 PROCEDURE — 84100 ASSAY OF PHOSPHORUS: CPT

## 2021-01-25 PROCEDURE — A4216 STERILE WATER/SALINE, 10 ML: HCPCS | Performed by: STUDENT IN AN ORGANIZED HEALTH CARE EDUCATION/TRAINING PROGRAM

## 2021-01-25 PROCEDURE — 85025 COMPLETE CBC W/AUTO DIFF WBC: CPT

## 2021-01-25 PROCEDURE — 97530 THERAPEUTIC ACTIVITIES: CPT

## 2021-01-25 PROCEDURE — 27000248 HC VAD-ADDITIONAL DAY

## 2021-01-25 PROCEDURE — 99900035 HC TECH TIME PER 15 MIN (STAT)

## 2021-01-25 PROCEDURE — 99231 SBSQ HOSP IP/OBS SF/LOW 25: CPT | Mod: ,,, | Performed by: NURSE PRACTITIONER

## 2021-01-25 PROCEDURE — 82803 BLOOD GASES ANY COMBINATION: CPT

## 2021-01-25 PROCEDURE — 83735 ASSAY OF MAGNESIUM: CPT

## 2021-01-25 PROCEDURE — 63600175 PHARM REV CODE 636 W HCPCS: Performed by: STUDENT IN AN ORGANIZED HEALTH CARE EDUCATION/TRAINING PROGRAM

## 2021-01-25 RX ORDER — ASPIRIN 325 MG
325 TABLET, DELAYED RELEASE (ENTERIC COATED) ORAL DAILY
Status: DISCONTINUED | OUTPATIENT
Start: 2021-01-26 | End: 2021-02-04 | Stop reason: HOSPADM

## 2021-01-25 RX ORDER — WARFARIN SODIUM 5 MG/1
10 TABLET ORAL DAILY
Status: DISCONTINUED | OUTPATIENT
Start: 2021-01-25 | End: 2021-01-26

## 2021-01-25 RX ADMIN — ATORVASTATIN CALCIUM 80 MG: 20 TABLET, FILM COATED ORAL at 08:01

## 2021-01-25 RX ADMIN — Medication 400 MG: at 04:01

## 2021-01-25 RX ADMIN — AMIODARONE HYDROCHLORIDE 200 MG: 200 TABLET ORAL at 08:01

## 2021-01-25 RX ADMIN — ALTEPLASE 2 MG: 2.2 INJECTION, POWDER, LYOPHILIZED, FOR SOLUTION INTRAVENOUS at 05:01

## 2021-01-25 RX ADMIN — ASPIRIN 81 MG: 81 TABLET, COATED ORAL at 08:01

## 2021-01-25 RX ADMIN — Medication 400 MG: at 08:01

## 2021-01-25 RX ADMIN — INSULIN ASPART 4 UNITS: 100 INJECTION, SOLUTION INTRAVENOUS; SUBCUTANEOUS at 08:01

## 2021-01-25 RX ADMIN — Medication 10 ML: at 12:01

## 2021-01-25 RX ADMIN — INSULIN ASPART 4 UNITS: 100 INJECTION, SOLUTION INTRAVENOUS; SUBCUTANEOUS at 12:01

## 2021-01-25 RX ADMIN — POLYETHYLENE GLYCOL 3350 17 G: 17 POWDER, FOR SOLUTION ORAL at 08:01

## 2021-01-25 RX ADMIN — ALTEPLASE 2 MG: 2.2 INJECTION, POWDER, LYOPHILIZED, FOR SOLUTION INTRAVENOUS at 07:01

## 2021-01-25 RX ADMIN — POTASSIUM CHLORIDE 20 MEQ: 20 TABLET, EXTENDED RELEASE ORAL at 08:01

## 2021-01-25 RX ADMIN — HEPARIN SODIUM AND DEXTROSE 12 UNITS/KG/HR: 10000; 5 INJECTION INTRAVENOUS at 09:01

## 2021-01-25 RX ADMIN — WARFARIN SODIUM 10 MG: 5 TABLET ORAL at 05:01

## 2021-01-25 RX ADMIN — Medication 10 ML: at 11:01

## 2021-01-26 LAB
ANION GAP SERPL CALC-SCNC: 8 MMOL/L (ref 8–16)
APTT BLDCRRT: 68.2 SEC (ref 21–32)
BASOPHILS # BLD AUTO: 0.04 K/UL (ref 0–0.2)
BASOPHILS NFR BLD: 0.4 % (ref 0–1.9)
BUN SERPL-MCNC: 17 MG/DL (ref 8–23)
CALCIUM SERPL-MCNC: 8.6 MG/DL (ref 8.7–10.5)
CHLORIDE SERPL-SCNC: 104 MMOL/L (ref 95–110)
CO2 SERPL-SCNC: 21 MMOL/L (ref 23–29)
CREAT SERPL-MCNC: 0.9 MG/DL (ref 0.5–1.4)
DIFFERENTIAL METHOD: ABNORMAL
EOSINOPHIL # BLD AUTO: 0.3 K/UL (ref 0–0.5)
EOSINOPHIL NFR BLD: 2.3 % (ref 0–8)
ERYTHROCYTE [DISTWIDTH] IN BLOOD BY AUTOMATED COUNT: 14.6 % (ref 11.5–14.5)
EST. GFR  (AFRICAN AMERICAN): >60 ML/MIN/1.73 M^2
EST. GFR  (NON AFRICAN AMERICAN): >60 ML/MIN/1.73 M^2
GLUCOSE SERPL-MCNC: 116 MG/DL (ref 70–110)
HCT VFR BLD AUTO: 25.1 % (ref 40–54)
HGB BLD-MCNC: 7.7 G/DL (ref 14–18)
IMM GRANULOCYTES # BLD AUTO: 0.05 K/UL (ref 0–0.04)
IMM GRANULOCYTES NFR BLD AUTO: 0.5 % (ref 0–0.5)
INR PPP: 2 (ref 0.8–1.2)
LDH SERPL L TO P-CCNC: 267 U/L (ref 110–260)
LYMPHOCYTES # BLD AUTO: 2.1 K/UL (ref 1–4.8)
LYMPHOCYTES NFR BLD: 19.2 % (ref 18–48)
MAGNESIUM SERPL-MCNC: 1.9 MG/DL (ref 1.6–2.6)
MCH RBC QN AUTO: 29.4 PG (ref 27–31)
MCHC RBC AUTO-ENTMCNC: 30.7 G/DL (ref 32–36)
MCV RBC AUTO: 96 FL (ref 82–98)
MONOCYTES # BLD AUTO: 0.9 K/UL (ref 0.3–1)
MONOCYTES NFR BLD: 8.5 % (ref 4–15)
NEUTROPHILS # BLD AUTO: 7.5 K/UL (ref 1.8–7.7)
NEUTROPHILS NFR BLD: 69.1 % (ref 38–73)
NRBC BLD-RTO: 0 /100 WBC
PHOSPHATE SERPL-MCNC: 3.3 MG/DL (ref 2.7–4.5)
PLATELET # BLD AUTO: 290 K/UL (ref 150–350)
PMV BLD AUTO: 10.5 FL (ref 9.2–12.9)
POCT GLUCOSE: 112 MG/DL (ref 70–110)
POCT GLUCOSE: 122 MG/DL (ref 70–110)
POCT GLUCOSE: 128 MG/DL (ref 70–110)
POCT GLUCOSE: 131 MG/DL (ref 70–110)
POTASSIUM SERPL-SCNC: 4.7 MMOL/L (ref 3.5–5.1)
PROTHROMBIN TIME: 20.6 SEC (ref 9–12.5)
RBC # BLD AUTO: 2.62 M/UL (ref 4.6–6.2)
SODIUM SERPL-SCNC: 133 MMOL/L (ref 136–145)
WBC # BLD AUTO: 10.89 K/UL (ref 3.9–12.7)

## 2021-01-26 PROCEDURE — 99233 PR SUBSEQUENT HOSPITAL CARE,LEVL III: ICD-10-PCS | Mod: 24,25,, | Performed by: THORACIC SURGERY (CARDIOTHORACIC VASCULAR SURGERY)

## 2021-01-26 PROCEDURE — 63600175 PHARM REV CODE 636 W HCPCS: Performed by: STUDENT IN AN ORGANIZED HEALTH CARE EDUCATION/TRAINING PROGRAM

## 2021-01-26 PROCEDURE — 99232 SBSQ HOSP IP/OBS MODERATE 35: CPT | Mod: ,,, | Performed by: INTERNAL MEDICINE

## 2021-01-26 PROCEDURE — C9113 INJ PANTOPRAZOLE SODIUM, VIA: HCPCS | Performed by: STUDENT IN AN ORGANIZED HEALTH CARE EDUCATION/TRAINING PROGRAM

## 2021-01-26 PROCEDURE — 84100 ASSAY OF PHOSPHORUS: CPT

## 2021-01-26 PROCEDURE — 20600001 HC STEP DOWN PRIVATE ROOM

## 2021-01-26 PROCEDURE — 25000003 PHARM REV CODE 250: Performed by: STUDENT IN AN ORGANIZED HEALTH CARE EDUCATION/TRAINING PROGRAM

## 2021-01-26 PROCEDURE — 83735 ASSAY OF MAGNESIUM: CPT

## 2021-01-26 PROCEDURE — A4216 STERILE WATER/SALINE, 10 ML: HCPCS | Performed by: STUDENT IN AN ORGANIZED HEALTH CARE EDUCATION/TRAINING PROGRAM

## 2021-01-26 PROCEDURE — 93750 PR INTERROGATE VENT ASSIST DEV, IN PERSON, W PHYSICIAN ANALYSIS: ICD-10-PCS | Mod: 77,,, | Performed by: THORACIC SURGERY (CARDIOTHORACIC VASCULAR SURGERY)

## 2021-01-26 PROCEDURE — 93750 PR INTERROGATE VENT ASSIST DEV, IN PERSON, W PHYSICIAN ANALYSIS: ICD-10-PCS | Mod: ,,, | Performed by: INTERNAL MEDICINE

## 2021-01-26 PROCEDURE — 27000248 HC VAD-ADDITIONAL DAY

## 2021-01-26 PROCEDURE — 36415 COLL VENOUS BLD VENIPUNCTURE: CPT

## 2021-01-26 PROCEDURE — 83615 LACTATE (LD) (LDH) ENZYME: CPT

## 2021-01-26 PROCEDURE — 93750 INTERROGATION VAD IN PERSON: CPT | Mod: ,,, | Performed by: INTERNAL MEDICINE

## 2021-01-26 PROCEDURE — 93750 INTERROGATION VAD IN PERSON: CPT | Mod: 77,,, | Performed by: THORACIC SURGERY (CARDIOTHORACIC VASCULAR SURGERY)

## 2021-01-26 PROCEDURE — 99233 SBSQ HOSP IP/OBS HIGH 50: CPT | Mod: 24,25,, | Performed by: THORACIC SURGERY (CARDIOTHORACIC VASCULAR SURGERY)

## 2021-01-26 PROCEDURE — 85025 COMPLETE CBC W/AUTO DIFF WBC: CPT

## 2021-01-26 PROCEDURE — 25000003 PHARM REV CODE 250: Performed by: INTERNAL MEDICINE

## 2021-01-26 PROCEDURE — 99232 PR SUBSEQUENT HOSPITAL CARE,LEVL II: ICD-10-PCS | Mod: ,,, | Performed by: INTERNAL MEDICINE

## 2021-01-26 PROCEDURE — 27000685 HC LVAD KIT (30 DAY SUPPLY)

## 2021-01-26 PROCEDURE — 85730 THROMBOPLASTIN TIME PARTIAL: CPT

## 2021-01-26 PROCEDURE — 80048 BASIC METABOLIC PNL TOTAL CA: CPT

## 2021-01-26 PROCEDURE — 25000003 PHARM REV CODE 250: Performed by: HOSPITALIST

## 2021-01-26 PROCEDURE — 85610 PROTHROMBIN TIME: CPT

## 2021-01-26 RX ADMIN — INSULIN ASPART 4 UNITS: 100 INJECTION, SOLUTION INTRAVENOUS; SUBCUTANEOUS at 12:01

## 2021-01-26 RX ADMIN — WARFARIN SODIUM 8 MG: 5 TABLET ORAL at 04:01

## 2021-01-26 RX ADMIN — PANTOPRAZOLE SODIUM 40 MG: 40 INJECTION, POWDER, FOR SOLUTION INTRAVENOUS at 08:01

## 2021-01-26 RX ADMIN — INSULIN ASPART 4 UNITS: 100 INJECTION, SOLUTION INTRAVENOUS; SUBCUTANEOUS at 08:01

## 2021-01-26 RX ADMIN — Medication 10 ML: at 12:01

## 2021-01-26 RX ADMIN — Medication 400 MG: at 08:01

## 2021-01-26 RX ADMIN — POTASSIUM CHLORIDE 20 MEQ: 20 TABLET, EXTENDED RELEASE ORAL at 08:01

## 2021-01-26 RX ADMIN — INSULIN ASPART 4 UNITS: 100 INJECTION, SOLUTION INTRAVENOUS; SUBCUTANEOUS at 04:01

## 2021-01-26 RX ADMIN — Medication 10 ML: at 05:01

## 2021-01-26 RX ADMIN — Medication 400 MG: at 03:01

## 2021-01-26 RX ADMIN — ASPIRIN 325 MG: 325 TABLET, COATED ORAL at 08:01

## 2021-01-26 RX ADMIN — ATORVASTATIN CALCIUM 80 MG: 20 TABLET, FILM COATED ORAL at 08:01

## 2021-01-26 RX ADMIN — Medication 10 ML: at 11:01

## 2021-01-26 RX ADMIN — AMIODARONE HYDROCHLORIDE 200 MG: 200 TABLET ORAL at 08:01

## 2021-01-27 PROBLEM — Z79.01 ANTICOAGULATED: Status: ACTIVE | Noted: 2021-01-27

## 2021-01-27 PROBLEM — N17.9 AKI (ACUTE KIDNEY INJURY): Status: RESOLVED | Noted: 2021-01-11 | Resolved: 2021-01-27

## 2021-01-27 LAB
25(OH)D3+25(OH)D2 SERPL-MCNC: 6 NG/ML (ref 30–96)
ALBUMIN SERPL BCP-MCNC: 2.7 G/DL (ref 3.5–5.2)
ALP SERPL-CCNC: 118 U/L (ref 55–135)
ALT SERPL W/O P-5'-P-CCNC: 9 U/L (ref 10–44)
ANION GAP SERPL CALC-SCNC: 9 MMOL/L (ref 8–16)
APTT BLDCRRT: 36.9 SEC (ref 21–32)
AST SERPL-CCNC: 21 U/L (ref 10–40)
BASOPHILS # BLD AUTO: 0.03 K/UL (ref 0–0.2)
BASOPHILS NFR BLD: 0.3 % (ref 0–1.9)
BILIRUB DIRECT SERPL-MCNC: 0.7 MG/DL (ref 0.1–0.3)
BILIRUB SERPL-MCNC: 1.2 MG/DL (ref 0.1–1)
BNP SERPL-MCNC: 583 PG/ML (ref 0–99)
BUN SERPL-MCNC: 19 MG/DL (ref 8–23)
CALCIUM SERPL-MCNC: 8.9 MG/DL (ref 8.7–10.5)
CHLORIDE SERPL-SCNC: 105 MMOL/L (ref 95–110)
CHOLEST SERPL-MCNC: 94 MG/DL (ref 120–199)
CHOLEST/HDLC SERPL: 2.8 {RATIO} (ref 2–5)
CO2 SERPL-SCNC: 21 MMOL/L (ref 23–29)
CREAT SERPL-MCNC: 1 MG/DL (ref 0.5–1.4)
CRP SERPL-MCNC: 59.3 MG/L (ref 0–8.2)
DIFFERENTIAL METHOD: ABNORMAL
EOSINOPHIL # BLD AUTO: 0.2 K/UL (ref 0–0.5)
EOSINOPHIL NFR BLD: 1.8 % (ref 0–8)
ERYTHROCYTE [DISTWIDTH] IN BLOOD BY AUTOMATED COUNT: 14.6 % (ref 11.5–14.5)
EST. GFR  (AFRICAN AMERICAN): >60 ML/MIN/1.73 M^2
EST. GFR  (NON AFRICAN AMERICAN): >60 ML/MIN/1.73 M^2
FERRITIN SERPL-MCNC: 460 NG/ML (ref 20–300)
GLUCOSE SERPL-MCNC: 132 MG/DL (ref 70–110)
HCT VFR BLD AUTO: 27.1 % (ref 40–54)
HDLC SERPL-MCNC: 34 MG/DL (ref 40–75)
HDLC SERPL: 36.2 % (ref 20–50)
HGB BLD-MCNC: 8.4 G/DL (ref 14–18)
IMM GRANULOCYTES # BLD AUTO: 0.07 K/UL (ref 0–0.04)
IMM GRANULOCYTES NFR BLD AUTO: 0.6 % (ref 0–0.5)
INR PPP: 2.6 (ref 0.8–1.2)
IRON SERPL-MCNC: 32 UG/DL (ref 45–160)
LDH SERPL L TO P-CCNC: 279 U/L (ref 110–260)
LDLC SERPL CALC-MCNC: 44.6 MG/DL (ref 63–159)
LYMPHOCYTES # BLD AUTO: 1.6 K/UL (ref 1–4.8)
LYMPHOCYTES NFR BLD: 13.4 % (ref 18–48)
MAGNESIUM SERPL-MCNC: 1.9 MG/DL (ref 1.6–2.6)
MCH RBC QN AUTO: 30 PG (ref 27–31)
MCHC RBC AUTO-ENTMCNC: 31 G/DL (ref 32–36)
MCV RBC AUTO: 97 FL (ref 82–98)
MONOCYTES # BLD AUTO: 1 K/UL (ref 0.3–1)
MONOCYTES NFR BLD: 8.2 % (ref 4–15)
NEUTROPHILS # BLD AUTO: 8.9 K/UL (ref 1.8–7.7)
NEUTROPHILS NFR BLD: 75.7 % (ref 38–73)
NONHDLC SERPL-MCNC: 60 MG/DL
NRBC BLD-RTO: 0 /100 WBC
PHOSPHATE SERPL-MCNC: 3.2 MG/DL (ref 2.7–4.5)
PLATELET # BLD AUTO: 288 K/UL (ref 150–350)
PMV BLD AUTO: 10.4 FL (ref 9.2–12.9)
POCT GLUCOSE: 100 MG/DL (ref 70–110)
POCT GLUCOSE: 134 MG/DL (ref 70–110)
POCT GLUCOSE: 151 MG/DL (ref 70–110)
POCT GLUCOSE: 89 MG/DL (ref 70–110)
POTASSIUM SERPL-SCNC: 5.1 MMOL/L (ref 3.5–5.1)
POTASSIUM SERPL-SCNC: 5.3 MMOL/L (ref 3.5–5.1)
PREALB SERPL-MCNC: 8 MG/DL (ref 20–43)
PROT SERPL-MCNC: 8 G/DL (ref 6–8.4)
PROTHROMBIN TIME: 26.9 SEC (ref 9–12.5)
RBC # BLD AUTO: 2.8 M/UL (ref 4.6–6.2)
SATURATED IRON: 10 % (ref 20–50)
SODIUM SERPL-SCNC: 135 MMOL/L (ref 136–145)
TOTAL IRON BINDING CAPACITY: 334 UG/DL (ref 250–450)
TRANSFERRIN SERPL-MCNC: 226 MG/DL (ref 200–375)
TRIGL SERPL-MCNC: 77 MG/DL (ref 30–150)
WBC # BLD AUTO: 11.68 K/UL (ref 3.9–12.7)

## 2021-01-27 PROCEDURE — 25000003 PHARM REV CODE 250: Performed by: INTERNAL MEDICINE

## 2021-01-27 PROCEDURE — 83540 ASSAY OF IRON: CPT

## 2021-01-27 PROCEDURE — 97530 THERAPEUTIC ACTIVITIES: CPT | Mod: CQ

## 2021-01-27 PROCEDURE — 63600175 PHARM REV CODE 636 W HCPCS: Performed by: STUDENT IN AN ORGANIZED HEALTH CARE EDUCATION/TRAINING PROGRAM

## 2021-01-27 PROCEDURE — 36415 COLL VENOUS BLD VENIPUNCTURE: CPT

## 2021-01-27 PROCEDURE — 80048 BASIC METABOLIC PNL TOTAL CA: CPT

## 2021-01-27 PROCEDURE — 99232 PR SUBSEQUENT HOSPITAL CARE,LEVL II: ICD-10-PCS | Mod: 25,,, | Performed by: INTERNAL MEDICINE

## 2021-01-27 PROCEDURE — 84100 ASSAY OF PHOSPHORUS: CPT

## 2021-01-27 PROCEDURE — 93750 INTERROGATION VAD IN PERSON: CPT | Mod: ,,, | Performed by: THORACIC SURGERY (CARDIOTHORACIC VASCULAR SURGERY)

## 2021-01-27 PROCEDURE — 25000003 PHARM REV CODE 250: Performed by: HOSPITALIST

## 2021-01-27 PROCEDURE — 82728 ASSAY OF FERRITIN: CPT

## 2021-01-27 PROCEDURE — 97530 THERAPEUTIC ACTIVITIES: CPT

## 2021-01-27 PROCEDURE — 97535 SELF CARE MNGMENT TRAINING: CPT

## 2021-01-27 PROCEDURE — 25000003 PHARM REV CODE 250: Performed by: STUDENT IN AN ORGANIZED HEALTH CARE EDUCATION/TRAINING PROGRAM

## 2021-01-27 PROCEDURE — 27000248 HC VAD-ADDITIONAL DAY

## 2021-01-27 PROCEDURE — 83615 LACTATE (LD) (LDH) ENZYME: CPT

## 2021-01-27 PROCEDURE — A4216 STERILE WATER/SALINE, 10 ML: HCPCS | Performed by: STUDENT IN AN ORGANIZED HEALTH CARE EDUCATION/TRAINING PROGRAM

## 2021-01-27 PROCEDURE — 82306 VITAMIN D 25 HYDROXY: CPT

## 2021-01-27 PROCEDURE — 80061 LIPID PANEL: CPT

## 2021-01-27 PROCEDURE — 80076 HEPATIC FUNCTION PANEL: CPT

## 2021-01-27 PROCEDURE — 85730 THROMBOPLASTIN TIME PARTIAL: CPT

## 2021-01-27 PROCEDURE — 84134 ASSAY OF PREALBUMIN: CPT

## 2021-01-27 PROCEDURE — 99233 SBSQ HOSP IP/OBS HIGH 50: CPT | Mod: 24,25,, | Performed by: THORACIC SURGERY (CARDIOTHORACIC VASCULAR SURGERY)

## 2021-01-27 PROCEDURE — 93750 PR INTERROGATE VENT ASSIST DEV, IN PERSON, W PHYSICIAN ANALYSIS: ICD-10-PCS | Mod: ,,, | Performed by: INTERNAL MEDICINE

## 2021-01-27 PROCEDURE — 85610 PROTHROMBIN TIME: CPT

## 2021-01-27 PROCEDURE — 83735 ASSAY OF MAGNESIUM: CPT

## 2021-01-27 PROCEDURE — 93750 INTERROGATION VAD IN PERSON: CPT | Mod: ,,, | Performed by: INTERNAL MEDICINE

## 2021-01-27 PROCEDURE — C9113 INJ PANTOPRAZOLE SODIUM, VIA: HCPCS | Performed by: STUDENT IN AN ORGANIZED HEALTH CARE EDUCATION/TRAINING PROGRAM

## 2021-01-27 PROCEDURE — 20600001 HC STEP DOWN PRIVATE ROOM

## 2021-01-27 PROCEDURE — 86140 C-REACTIVE PROTEIN: CPT

## 2021-01-27 PROCEDURE — 93750 PR INTERROGATE VENT ASSIST DEV, IN PERSON, W PHYSICIAN ANALYSIS: ICD-10-PCS | Mod: ,,, | Performed by: THORACIC SURGERY (CARDIOTHORACIC VASCULAR SURGERY)

## 2021-01-27 PROCEDURE — 85025 COMPLETE CBC W/AUTO DIFF WBC: CPT

## 2021-01-27 PROCEDURE — 27000685 HC LVAD KIT (30 DAY SUPPLY)

## 2021-01-27 PROCEDURE — 99232 SBSQ HOSP IP/OBS MODERATE 35: CPT | Mod: 25,,, | Performed by: INTERNAL MEDICINE

## 2021-01-27 PROCEDURE — 84132 ASSAY OF SERUM POTASSIUM: CPT

## 2021-01-27 PROCEDURE — 83880 ASSAY OF NATRIURETIC PEPTIDE: CPT

## 2021-01-27 PROCEDURE — 97116 GAIT TRAINING THERAPY: CPT | Mod: CQ

## 2021-01-27 PROCEDURE — 99233 PR SUBSEQUENT HOSPITAL CARE,LEVL III: ICD-10-PCS | Mod: 24,25,, | Performed by: THORACIC SURGERY (CARDIOTHORACIC VASCULAR SURGERY)

## 2021-01-27 RX ORDER — ERGOCALCIFEROL 1.25 MG/1
50000 CAPSULE ORAL DAILY
Status: COMPLETED | OUTPATIENT
Start: 2021-01-27 | End: 2021-01-31

## 2021-01-27 RX ORDER — WARFARIN 2 MG/1
4 TABLET ORAL DAILY
Status: DISCONTINUED | OUTPATIENT
Start: 2021-01-27 | End: 2021-01-29

## 2021-01-27 RX ORDER — PANTOPRAZOLE SODIUM 40 MG/1
40 TABLET, DELAYED RELEASE ORAL DAILY
Status: DISCONTINUED | OUTPATIENT
Start: 2021-01-28 | End: 2021-02-04 | Stop reason: HOSPADM

## 2021-01-27 RX ADMIN — INSULIN ASPART 4 UNITS: 100 INJECTION, SOLUTION INTRAVENOUS; SUBCUTANEOUS at 08:01

## 2021-01-27 RX ADMIN — INSULIN ASPART 4 UNITS: 100 INJECTION, SOLUTION INTRAVENOUS; SUBCUTANEOUS at 05:01

## 2021-01-27 RX ADMIN — PANTOPRAZOLE SODIUM 40 MG: 40 INJECTION, POWDER, FOR SOLUTION INTRAVENOUS at 09:01

## 2021-01-27 RX ADMIN — Medication 400 MG: at 09:01

## 2021-01-27 RX ADMIN — ATORVASTATIN CALCIUM 80 MG: 20 TABLET, FILM COATED ORAL at 09:01

## 2021-01-27 RX ADMIN — ERGOCALCIFEROL 50000 UNITS: 1.25 CAPSULE ORAL at 12:01

## 2021-01-27 RX ADMIN — Medication 10 ML: at 12:01

## 2021-01-27 RX ADMIN — Medication 400 MG: at 08:01

## 2021-01-27 RX ADMIN — Medication 400 MG: at 03:01

## 2021-01-27 RX ADMIN — Medication 10 ML: at 06:01

## 2021-01-27 RX ADMIN — WARFARIN SODIUM 4 MG: 2 TABLET ORAL at 04:01

## 2021-01-27 RX ADMIN — AMIODARONE HYDROCHLORIDE 200 MG: 200 TABLET ORAL at 09:01

## 2021-01-27 RX ADMIN — ASPIRIN 325 MG: 325 TABLET, COATED ORAL at 09:01

## 2021-01-28 LAB
ANION GAP SERPL CALC-SCNC: 10 MMOL/L (ref 8–16)
BASOPHILS # BLD AUTO: 0.03 K/UL (ref 0–0.2)
BASOPHILS NFR BLD: 0.3 % (ref 0–1.9)
BUN SERPL-MCNC: 21 MG/DL (ref 8–23)
CALCIUM SERPL-MCNC: 9 MG/DL (ref 8.7–10.5)
CHLORIDE SERPL-SCNC: 105 MMOL/L (ref 95–110)
CO2 SERPL-SCNC: 21 MMOL/L (ref 23–29)
CREAT SERPL-MCNC: 1.1 MG/DL (ref 0.5–1.4)
DIFFERENTIAL METHOD: ABNORMAL
EOSINOPHIL # BLD AUTO: 0.2 K/UL (ref 0–0.5)
EOSINOPHIL NFR BLD: 2 % (ref 0–8)
ERYTHROCYTE [DISTWIDTH] IN BLOOD BY AUTOMATED COUNT: 14.7 % (ref 11.5–14.5)
EST. GFR  (AFRICAN AMERICAN): >60 ML/MIN/1.73 M^2
EST. GFR  (NON AFRICAN AMERICAN): >60 ML/MIN/1.73 M^2
GLUCOSE SERPL-MCNC: 108 MG/DL (ref 70–110)
HCT VFR BLD AUTO: 26.4 % (ref 40–54)
HGB BLD-MCNC: 8 G/DL (ref 14–18)
IMM GRANULOCYTES # BLD AUTO: 0.05 K/UL (ref 0–0.04)
IMM GRANULOCYTES NFR BLD AUTO: 0.5 % (ref 0–0.5)
INR PPP: 2.8 (ref 0.8–1.2)
LDH SERPL L TO P-CCNC: 279 U/L (ref 110–260)
LYMPHOCYTES # BLD AUTO: 1.9 K/UL (ref 1–4.8)
LYMPHOCYTES NFR BLD: 20.3 % (ref 18–48)
MAGNESIUM SERPL-MCNC: 1.9 MG/DL (ref 1.6–2.6)
MCH RBC QN AUTO: 30 PG (ref 27–31)
MCHC RBC AUTO-ENTMCNC: 30.3 G/DL (ref 32–36)
MCV RBC AUTO: 99 FL (ref 82–98)
MONOCYTES # BLD AUTO: 0.8 K/UL (ref 0.3–1)
MONOCYTES NFR BLD: 8.3 % (ref 4–15)
NEUTROPHILS # BLD AUTO: 6.4 K/UL (ref 1.8–7.7)
NEUTROPHILS NFR BLD: 68.6 % (ref 38–73)
NRBC BLD-RTO: 0 /100 WBC
PHOSPHATE SERPL-MCNC: 3.6 MG/DL (ref 2.7–4.5)
PLATELET # BLD AUTO: 301 K/UL (ref 150–350)
PMV BLD AUTO: 10.4 FL (ref 9.2–12.9)
POCT GLUCOSE: 103 MG/DL (ref 70–110)
POCT GLUCOSE: 118 MG/DL (ref 70–110)
POCT GLUCOSE: 146 MG/DL (ref 70–110)
POTASSIUM SERPL-SCNC: 4.3 MMOL/L (ref 3.5–5.1)
PROTHROMBIN TIME: 28.4 SEC (ref 9–12.5)
RBC # BLD AUTO: 2.67 M/UL (ref 4.6–6.2)
SODIUM SERPL-SCNC: 136 MMOL/L (ref 136–145)
WBC # BLD AUTO: 9.31 K/UL (ref 3.9–12.7)

## 2021-01-28 PROCEDURE — A4216 STERILE WATER/SALINE, 10 ML: HCPCS | Performed by: STUDENT IN AN ORGANIZED HEALTH CARE EDUCATION/TRAINING PROGRAM

## 2021-01-28 PROCEDURE — 97530 THERAPEUTIC ACTIVITIES: CPT | Mod: CQ

## 2021-01-28 PROCEDURE — 25000003 PHARM REV CODE 250: Performed by: INTERNAL MEDICINE

## 2021-01-28 PROCEDURE — 99232 SBSQ HOSP IP/OBS MODERATE 35: CPT | Mod: ,,, | Performed by: INTERNAL MEDICINE

## 2021-01-28 PROCEDURE — 83735 ASSAY OF MAGNESIUM: CPT

## 2021-01-28 PROCEDURE — 83615 LACTATE (LD) (LDH) ENZYME: CPT

## 2021-01-28 PROCEDURE — 84100 ASSAY OF PHOSPHORUS: CPT

## 2021-01-28 PROCEDURE — 85025 COMPLETE CBC W/AUTO DIFF WBC: CPT

## 2021-01-28 PROCEDURE — 97116 GAIT TRAINING THERAPY: CPT | Mod: CQ

## 2021-01-28 PROCEDURE — 63600175 PHARM REV CODE 636 W HCPCS: Performed by: STUDENT IN AN ORGANIZED HEALTH CARE EDUCATION/TRAINING PROGRAM

## 2021-01-28 PROCEDURE — 97803 MED NUTRITION INDIV SUBSEQ: CPT

## 2021-01-28 PROCEDURE — 99232 PR SUBSEQUENT HOSPITAL CARE,LEVL II: ICD-10-PCS | Mod: ,,, | Performed by: INTERNAL MEDICINE

## 2021-01-28 PROCEDURE — 93750 PR INTERROGATE VENT ASSIST DEV, IN PERSON, W PHYSICIAN ANALYSIS: ICD-10-PCS | Mod: ,,, | Performed by: INTERNAL MEDICINE

## 2021-01-28 PROCEDURE — 25000003 PHARM REV CODE 250: Performed by: STUDENT IN AN ORGANIZED HEALTH CARE EDUCATION/TRAINING PROGRAM

## 2021-01-28 PROCEDURE — 27000248 HC VAD-ADDITIONAL DAY

## 2021-01-28 PROCEDURE — 93750 INTERROGATION VAD IN PERSON: CPT | Mod: ,,, | Performed by: INTERNAL MEDICINE

## 2021-01-28 PROCEDURE — 80048 BASIC METABOLIC PNL TOTAL CA: CPT

## 2021-01-28 PROCEDURE — 36415 COLL VENOUS BLD VENIPUNCTURE: CPT

## 2021-01-28 PROCEDURE — 20600001 HC STEP DOWN PRIVATE ROOM

## 2021-01-28 PROCEDURE — 27000685 HC LVAD KIT (30 DAY SUPPLY)

## 2021-01-28 PROCEDURE — 25000003 PHARM REV CODE 250: Performed by: HOSPITALIST

## 2021-01-28 PROCEDURE — 85610 PROTHROMBIN TIME: CPT

## 2021-01-28 RX ADMIN — ATORVASTATIN CALCIUM 80 MG: 20 TABLET, FILM COATED ORAL at 09:01

## 2021-01-28 RX ADMIN — PANTOPRAZOLE SODIUM 40 MG: 40 TABLET, DELAYED RELEASE ORAL at 09:01

## 2021-01-28 RX ADMIN — ASPIRIN 325 MG: 325 TABLET, COATED ORAL at 09:01

## 2021-01-28 RX ADMIN — Medication 400 MG: at 02:01

## 2021-01-28 RX ADMIN — Medication 10 ML: at 11:01

## 2021-01-28 RX ADMIN — AMIODARONE HYDROCHLORIDE 200 MG: 200 TABLET ORAL at 09:01

## 2021-01-28 RX ADMIN — WARFARIN SODIUM 4 MG: 2 TABLET ORAL at 04:01

## 2021-01-28 RX ADMIN — Medication 10 ML: at 12:01

## 2021-01-28 RX ADMIN — Medication 400 MG: at 09:01

## 2021-01-28 RX ADMIN — INSULIN ASPART 4 UNITS: 100 INJECTION, SOLUTION INTRAVENOUS; SUBCUTANEOUS at 11:01

## 2021-01-28 RX ADMIN — Medication 10 ML: at 05:01

## 2021-01-28 RX ADMIN — ERGOCALCIFEROL 50000 UNITS: 1.25 CAPSULE ORAL at 09:01

## 2021-01-28 RX ADMIN — INSULIN ASPART 4 UNITS: 100 INJECTION, SOLUTION INTRAVENOUS; SUBCUTANEOUS at 07:01

## 2021-01-28 RX ADMIN — INSULIN ASPART 4 UNITS: 100 INJECTION, SOLUTION INTRAVENOUS; SUBCUTANEOUS at 04:01

## 2021-01-29 LAB
ALBUMIN SERPL BCP-MCNC: 2.8 G/DL (ref 3.5–5.2)
ALP SERPL-CCNC: 127 U/L (ref 55–135)
ALT SERPL W/O P-5'-P-CCNC: 7 U/L (ref 10–44)
ANION GAP SERPL CALC-SCNC: 11 MMOL/L (ref 8–16)
AST SERPL-CCNC: 18 U/L (ref 10–40)
BASOPHILS # BLD AUTO: 0.04 K/UL (ref 0–0.2)
BASOPHILS NFR BLD: 0.4 % (ref 0–1.9)
BILIRUB DIRECT SERPL-MCNC: 0.7 MG/DL (ref 0.1–0.3)
BILIRUB SERPL-MCNC: 1.1 MG/DL (ref 0.1–1)
BNP SERPL-MCNC: 531 PG/ML (ref 0–99)
BUN SERPL-MCNC: 21 MG/DL (ref 8–23)
CALCIUM SERPL-MCNC: 8.9 MG/DL (ref 8.7–10.5)
CHLORIDE SERPL-SCNC: 105 MMOL/L (ref 95–110)
CO2 SERPL-SCNC: 20 MMOL/L (ref 23–29)
CREAT SERPL-MCNC: 1.1 MG/DL (ref 0.5–1.4)
CRP SERPL-MCNC: 65.1 MG/L (ref 0–8.2)
DIFFERENTIAL METHOD: ABNORMAL
EOSINOPHIL # BLD AUTO: 0.2 K/UL (ref 0–0.5)
EOSINOPHIL NFR BLD: 2.7 % (ref 0–8)
ERYTHROCYTE [DISTWIDTH] IN BLOOD BY AUTOMATED COUNT: 14.8 % (ref 11.5–14.5)
EST. GFR  (AFRICAN AMERICAN): >60 ML/MIN/1.73 M^2
EST. GFR  (NON AFRICAN AMERICAN): >60 ML/MIN/1.73 M^2
GLUCOSE SERPL-MCNC: 121 MG/DL (ref 70–110)
HCT VFR BLD AUTO: 26.3 % (ref 40–54)
HGB BLD-MCNC: 7.9 G/DL (ref 14–18)
IMM GRANULOCYTES # BLD AUTO: 0.05 K/UL (ref 0–0.04)
IMM GRANULOCYTES NFR BLD AUTO: 0.6 % (ref 0–0.5)
INR PPP: 2.4 (ref 0.8–1.2)
LDH SERPL L TO P-CCNC: 286 U/L (ref 110–260)
LYMPHOCYTES # BLD AUTO: 1.8 K/UL (ref 1–4.8)
LYMPHOCYTES NFR BLD: 19.7 % (ref 18–48)
MAGNESIUM SERPL-MCNC: 1.9 MG/DL (ref 1.6–2.6)
MCH RBC QN AUTO: 29.6 PG (ref 27–31)
MCHC RBC AUTO-ENTMCNC: 30 G/DL (ref 32–36)
MCV RBC AUTO: 99 FL (ref 82–98)
MONOCYTES # BLD AUTO: 0.9 K/UL (ref 0.3–1)
MONOCYTES NFR BLD: 9.7 % (ref 4–15)
NEUTROPHILS # BLD AUTO: 6 K/UL (ref 1.8–7.7)
NEUTROPHILS NFR BLD: 66.9 % (ref 38–73)
NRBC BLD-RTO: 0 /100 WBC
PHOSPHATE SERPL-MCNC: 3.6 MG/DL (ref 2.7–4.5)
PLATELET # BLD AUTO: 320 K/UL (ref 150–350)
PMV BLD AUTO: 10.6 FL (ref 9.2–12.9)
POCT GLUCOSE: 104 MG/DL (ref 70–110)
POCT GLUCOSE: 113 MG/DL (ref 70–110)
POCT GLUCOSE: 212 MG/DL (ref 70–110)
POCT GLUCOSE: 95 MG/DL (ref 70–110)
POCT GLUCOSE: 98 MG/DL (ref 70–110)
POTASSIUM SERPL-SCNC: 4.1 MMOL/L (ref 3.5–5.1)
PREALB SERPL-MCNC: 7 MG/DL (ref 20–43)
PROT SERPL-MCNC: 8.3 G/DL (ref 6–8.4)
PROTHROMBIN TIME: 24.7 SEC (ref 9–12.5)
RBC # BLD AUTO: 2.67 M/UL (ref 4.6–6.2)
SODIUM SERPL-SCNC: 136 MMOL/L (ref 136–145)
WBC # BLD AUTO: 8.98 K/UL (ref 3.9–12.7)

## 2021-01-29 PROCEDURE — 97530 THERAPEUTIC ACTIVITIES: CPT

## 2021-01-29 PROCEDURE — 85025 COMPLETE CBC W/AUTO DIFF WBC: CPT

## 2021-01-29 PROCEDURE — 27000685 HC LVAD KIT (30 DAY SUPPLY)

## 2021-01-29 PROCEDURE — 27000248 HC VAD-ADDITIONAL DAY

## 2021-01-29 PROCEDURE — 25000003 PHARM REV CODE 250: Performed by: STUDENT IN AN ORGANIZED HEALTH CARE EDUCATION/TRAINING PROGRAM

## 2021-01-29 PROCEDURE — 25000003 PHARM REV CODE 250: Performed by: INTERNAL MEDICINE

## 2021-01-29 PROCEDURE — A4216 STERILE WATER/SALINE, 10 ML: HCPCS | Performed by: STUDENT IN AN ORGANIZED HEALTH CARE EDUCATION/TRAINING PROGRAM

## 2021-01-29 PROCEDURE — 99233 SBSQ HOSP IP/OBS HIGH 50: CPT | Mod: ,,, | Performed by: INTERNAL MEDICINE

## 2021-01-29 PROCEDURE — 97535 SELF CARE MNGMENT TRAINING: CPT

## 2021-01-29 PROCEDURE — 84134 ASSAY OF PREALBUMIN: CPT

## 2021-01-29 PROCEDURE — 63600175 PHARM REV CODE 636 W HCPCS: Performed by: NURSE PRACTITIONER

## 2021-01-29 PROCEDURE — 80076 HEPATIC FUNCTION PANEL: CPT

## 2021-01-29 PROCEDURE — 36415 COLL VENOUS BLD VENIPUNCTURE: CPT

## 2021-01-29 PROCEDURE — 97530 THERAPEUTIC ACTIVITIES: CPT | Mod: CQ

## 2021-01-29 PROCEDURE — 93750 INTERROGATION VAD IN PERSON: CPT | Mod: ,,, | Performed by: INTERNAL MEDICINE

## 2021-01-29 PROCEDURE — 99231 PR SUBSEQUENT HOSPITAL CARE,LEVL I: ICD-10-PCS | Mod: ,,, | Performed by: NURSE PRACTITIONER

## 2021-01-29 PROCEDURE — 99231 SBSQ HOSP IP/OBS SF/LOW 25: CPT | Mod: ,,, | Performed by: NURSE PRACTITIONER

## 2021-01-29 PROCEDURE — 83615 LACTATE (LD) (LDH) ENZYME: CPT

## 2021-01-29 PROCEDURE — 86140 C-REACTIVE PROTEIN: CPT

## 2021-01-29 PROCEDURE — 20600001 HC STEP DOWN PRIVATE ROOM

## 2021-01-29 PROCEDURE — 84100 ASSAY OF PHOSPHORUS: CPT

## 2021-01-29 PROCEDURE — 83735 ASSAY OF MAGNESIUM: CPT

## 2021-01-29 PROCEDURE — 83880 ASSAY OF NATRIURETIC PEPTIDE: CPT

## 2021-01-29 PROCEDURE — 85610 PROTHROMBIN TIME: CPT

## 2021-01-29 PROCEDURE — 99233 PR SUBSEQUENT HOSPITAL CARE,LEVL III: ICD-10-PCS | Mod: ,,, | Performed by: INTERNAL MEDICINE

## 2021-01-29 PROCEDURE — 93750 PR INTERROGATE VENT ASSIST DEV, IN PERSON, W PHYSICIAN ANALYSIS: ICD-10-PCS | Mod: ,,, | Performed by: INTERNAL MEDICINE

## 2021-01-29 PROCEDURE — 25000003 PHARM REV CODE 250: Performed by: HOSPITALIST

## 2021-01-29 PROCEDURE — 80048 BASIC METABOLIC PNL TOTAL CA: CPT

## 2021-01-29 RX ORDER — WARFARIN 3 MG/1
6 TABLET ORAL DAILY
Status: DISCONTINUED | OUTPATIENT
Start: 2021-01-29 | End: 2021-01-30

## 2021-01-29 RX ORDER — FERROUS SULFATE 325(65) MG
325 TABLET, DELAYED RELEASE (ENTERIC COATED) ORAL 2 TIMES DAILY
Status: DISCONTINUED | OUTPATIENT
Start: 2021-01-29 | End: 2021-02-04 | Stop reason: HOSPADM

## 2021-01-29 RX ADMIN — Medication 10 ML: at 12:01

## 2021-01-29 RX ADMIN — ERGOCALCIFEROL 50000 UNITS: 1.25 CAPSULE ORAL at 09:01

## 2021-01-29 RX ADMIN — ASPIRIN 325 MG: 325 TABLET, COATED ORAL at 09:01

## 2021-01-29 RX ADMIN — WARFARIN SODIUM 6 MG: 3 TABLET ORAL at 05:01

## 2021-01-29 RX ADMIN — Medication 400 MG: at 09:01

## 2021-01-29 RX ADMIN — INSULIN ASPART 4 UNITS: 100 INJECTION, SOLUTION INTRAVENOUS; SUBCUTANEOUS at 09:01

## 2021-01-29 RX ADMIN — Medication 10 ML: at 06:01

## 2021-01-29 RX ADMIN — INSULIN ASPART 4 UNITS: 100 INJECTION, SOLUTION INTRAVENOUS; SUBCUTANEOUS at 05:01

## 2021-01-29 RX ADMIN — FERROUS SULFATE TAB EC 325 MG (65 MG FE EQUIVALENT) 325 MG: 325 (65 FE) TABLET DELAYED RESPONSE at 11:01

## 2021-01-29 RX ADMIN — INSULIN ASPART 4 UNITS: 100 INJECTION, SOLUTION INTRAVENOUS; SUBCUTANEOUS at 01:01

## 2021-01-29 RX ADMIN — INSULIN ASPART 2 UNITS: 100 INJECTION, SOLUTION INTRAVENOUS; SUBCUTANEOUS at 09:01

## 2021-01-29 RX ADMIN — FERROUS SULFATE TAB EC 325 MG (65 MG FE EQUIVALENT) 325 MG: 325 (65 FE) TABLET DELAYED RESPONSE at 08:01

## 2021-01-29 RX ADMIN — ATORVASTATIN CALCIUM 80 MG: 20 TABLET, FILM COATED ORAL at 09:01

## 2021-01-29 RX ADMIN — Medication 400 MG: at 02:01

## 2021-01-29 RX ADMIN — Medication 400 MG: at 08:01

## 2021-01-29 RX ADMIN — AMIODARONE HYDROCHLORIDE 200 MG: 200 TABLET ORAL at 09:01

## 2021-01-29 RX ADMIN — PANTOPRAZOLE SODIUM 40 MG: 40 TABLET, DELAYED RELEASE ORAL at 09:01

## 2021-01-30 LAB
ANION GAP SERPL CALC-SCNC: 9 MMOL/L (ref 8–16)
BASOPHILS # BLD AUTO: 0.03 K/UL (ref 0–0.2)
BASOPHILS NFR BLD: 0.4 % (ref 0–1.9)
BUN SERPL-MCNC: 20 MG/DL (ref 8–23)
CALCIUM SERPL-MCNC: 8.5 MG/DL (ref 8.7–10.5)
CHLORIDE SERPL-SCNC: 106 MMOL/L (ref 95–110)
CO2 SERPL-SCNC: 22 MMOL/L (ref 23–29)
CREAT SERPL-MCNC: 1.1 MG/DL (ref 0.5–1.4)
DIFFERENTIAL METHOD: ABNORMAL
EOSINOPHIL # BLD AUTO: 0.2 K/UL (ref 0–0.5)
EOSINOPHIL NFR BLD: 2.1 % (ref 0–8)
ERYTHROCYTE [DISTWIDTH] IN BLOOD BY AUTOMATED COUNT: 15.1 % (ref 11.5–14.5)
EST. GFR  (AFRICAN AMERICAN): >60 ML/MIN/1.73 M^2
EST. GFR  (NON AFRICAN AMERICAN): >60 ML/MIN/1.73 M^2
GLUCOSE SERPL-MCNC: 124 MG/DL (ref 70–110)
HCT VFR BLD AUTO: 26.4 % (ref 40–54)
HGB BLD-MCNC: 7.8 G/DL (ref 14–18)
IMM GRANULOCYTES # BLD AUTO: 0.03 K/UL (ref 0–0.04)
IMM GRANULOCYTES NFR BLD AUTO: 0.4 % (ref 0–0.5)
INR PPP: 2 (ref 0.8–1.2)
LDH SERPL L TO P-CCNC: 273 U/L (ref 110–260)
LYMPHOCYTES # BLD AUTO: 1.6 K/UL (ref 1–4.8)
LYMPHOCYTES NFR BLD: 20.8 % (ref 18–48)
MAGNESIUM SERPL-MCNC: 1.8 MG/DL (ref 1.6–2.6)
MCH RBC QN AUTO: 29.2 PG (ref 27–31)
MCHC RBC AUTO-ENTMCNC: 29.5 G/DL (ref 32–36)
MCV RBC AUTO: 99 FL (ref 82–98)
MONOCYTES # BLD AUTO: 0.7 K/UL (ref 0.3–1)
MONOCYTES NFR BLD: 9.3 % (ref 4–15)
NEUTROPHILS # BLD AUTO: 5.1 K/UL (ref 1.8–7.7)
NEUTROPHILS NFR BLD: 67 % (ref 38–73)
NRBC BLD-RTO: 0 /100 WBC
PHOSPHATE SERPL-MCNC: 3.2 MG/DL (ref 2.7–4.5)
PLATELET # BLD AUTO: 321 K/UL (ref 150–350)
PMV BLD AUTO: 10.6 FL (ref 9.2–12.9)
POCT GLUCOSE: 112 MG/DL (ref 70–110)
POCT GLUCOSE: 151 MG/DL (ref 70–110)
POTASSIUM SERPL-SCNC: 4.4 MMOL/L (ref 3.5–5.1)
PROTHROMBIN TIME: 21.3 SEC (ref 9–12.5)
RBC # BLD AUTO: 2.67 M/UL (ref 4.6–6.2)
SODIUM SERPL-SCNC: 137 MMOL/L (ref 136–145)
WBC # BLD AUTO: 7.66 K/UL (ref 3.9–12.7)

## 2021-01-30 PROCEDURE — 93750 PR INTERROGATE VENT ASSIST DEV, IN PERSON, W PHYSICIAN ANALYSIS: ICD-10-PCS | Mod: ,,, | Performed by: INTERNAL MEDICINE

## 2021-01-30 PROCEDURE — 25000003 PHARM REV CODE 250: Performed by: NURSE PRACTITIONER

## 2021-01-30 PROCEDURE — 83735 ASSAY OF MAGNESIUM: CPT

## 2021-01-30 PROCEDURE — A4216 STERILE WATER/SALINE, 10 ML: HCPCS | Performed by: STUDENT IN AN ORGANIZED HEALTH CARE EDUCATION/TRAINING PROGRAM

## 2021-01-30 PROCEDURE — 85025 COMPLETE CBC W/AUTO DIFF WBC: CPT

## 2021-01-30 PROCEDURE — 99232 SBSQ HOSP IP/OBS MODERATE 35: CPT | Mod: ,,, | Performed by: INTERNAL MEDICINE

## 2021-01-30 PROCEDURE — 80048 BASIC METABOLIC PNL TOTAL CA: CPT

## 2021-01-30 PROCEDURE — 25000003 PHARM REV CODE 250: Performed by: INTERNAL MEDICINE

## 2021-01-30 PROCEDURE — 25000003 PHARM REV CODE 250: Performed by: STUDENT IN AN ORGANIZED HEALTH CARE EDUCATION/TRAINING PROGRAM

## 2021-01-30 PROCEDURE — 83615 LACTATE (LD) (LDH) ENZYME: CPT

## 2021-01-30 PROCEDURE — 20600001 HC STEP DOWN PRIVATE ROOM

## 2021-01-30 PROCEDURE — 93750 INTERROGATION VAD IN PERSON: CPT | Mod: ,,, | Performed by: INTERNAL MEDICINE

## 2021-01-30 PROCEDURE — 36415 COLL VENOUS BLD VENIPUNCTURE: CPT

## 2021-01-30 PROCEDURE — 84100 ASSAY OF PHOSPHORUS: CPT

## 2021-01-30 PROCEDURE — 99232 PR SUBSEQUENT HOSPITAL CARE,LEVL II: ICD-10-PCS | Mod: ,,, | Performed by: INTERNAL MEDICINE

## 2021-01-30 PROCEDURE — 27000248 HC VAD-ADDITIONAL DAY

## 2021-01-30 PROCEDURE — 25000003 PHARM REV CODE 250: Performed by: HOSPITALIST

## 2021-01-30 PROCEDURE — 85610 PROTHROMBIN TIME: CPT

## 2021-01-30 RX ORDER — WARFARIN 7.5 MG/1
7.5 TABLET ORAL ONCE
Status: COMPLETED | OUTPATIENT
Start: 2021-01-30 | End: 2021-01-30

## 2021-01-30 RX ORDER — LANOLIN ALCOHOL/MO/W.PET/CERES
400 CREAM (GRAM) TOPICAL ONCE
Status: COMPLETED | OUTPATIENT
Start: 2021-01-30 | End: 2021-01-30

## 2021-01-30 RX ORDER — WARFARIN 3 MG/1
6 TABLET ORAL DAILY
Status: DISCONTINUED | OUTPATIENT
Start: 2021-01-31 | End: 2021-01-31

## 2021-01-30 RX ADMIN — Medication 400 MG: at 08:01

## 2021-01-30 RX ADMIN — WARFARIN SODIUM 7.5 MG: 7.5 TABLET ORAL at 04:01

## 2021-01-30 RX ADMIN — ASPIRIN 325 MG: 325 TABLET, COATED ORAL at 08:01

## 2021-01-30 RX ADMIN — Medication 10 ML: at 12:01

## 2021-01-30 RX ADMIN — INSULIN ASPART 4 UNITS: 100 INJECTION, SOLUTION INTRAVENOUS; SUBCUTANEOUS at 08:01

## 2021-01-30 RX ADMIN — Medication 400 MG: at 09:01

## 2021-01-30 RX ADMIN — FERROUS SULFATE TAB EC 325 MG (65 MG FE EQUIVALENT) 325 MG: 325 (65 FE) TABLET DELAYED RESPONSE at 08:01

## 2021-01-30 RX ADMIN — SITAGLIPTIN 100 MG: 50 TABLET, FILM COATED ORAL at 10:01

## 2021-01-30 RX ADMIN — PANTOPRAZOLE SODIUM 40 MG: 40 TABLET, DELAYED RELEASE ORAL at 08:01

## 2021-01-30 RX ADMIN — DOCUSATE SODIUM 200 MG: 50 CAPSULE, LIQUID FILLED ORAL at 08:01

## 2021-01-30 RX ADMIN — ATORVASTATIN CALCIUM 80 MG: 20 TABLET, FILM COATED ORAL at 08:01

## 2021-01-30 RX ADMIN — Medication 10 ML: at 06:01

## 2021-01-30 RX ADMIN — Medication 400 MG: at 02:01

## 2021-01-30 RX ADMIN — ERGOCALCIFEROL 50000 UNITS: 1.25 CAPSULE ORAL at 08:01

## 2021-01-30 RX ADMIN — AMIODARONE HYDROCHLORIDE 200 MG: 200 TABLET ORAL at 08:01

## 2021-01-31 LAB
ANION GAP SERPL CALC-SCNC: 8 MMOL/L (ref 8–16)
BASOPHILS # BLD AUTO: 0.03 K/UL (ref 0–0.2)
BASOPHILS NFR BLD: 0.4 % (ref 0–1.9)
BUN SERPL-MCNC: 20 MG/DL (ref 8–23)
CALCIUM SERPL-MCNC: 8.6 MG/DL (ref 8.7–10.5)
CHLORIDE SERPL-SCNC: 107 MMOL/L (ref 95–110)
CO2 SERPL-SCNC: 21 MMOL/L (ref 23–29)
CREAT SERPL-MCNC: 1 MG/DL (ref 0.5–1.4)
DIFFERENTIAL METHOD: ABNORMAL
EOSINOPHIL # BLD AUTO: 0.2 K/UL (ref 0–0.5)
EOSINOPHIL NFR BLD: 2.7 % (ref 0–8)
ERYTHROCYTE [DISTWIDTH] IN BLOOD BY AUTOMATED COUNT: 15.3 % (ref 11.5–14.5)
EST. GFR  (AFRICAN AMERICAN): >60 ML/MIN/1.73 M^2
EST. GFR  (NON AFRICAN AMERICAN): >60 ML/MIN/1.73 M^2
GLUCOSE SERPL-MCNC: 111 MG/DL (ref 70–110)
HCT VFR BLD AUTO: 24.3 % (ref 40–54)
HGB BLD-MCNC: 7.5 G/DL (ref 14–18)
IMM GRANULOCYTES # BLD AUTO: 0.03 K/UL (ref 0–0.04)
IMM GRANULOCYTES NFR BLD AUTO: 0.4 % (ref 0–0.5)
INR PPP: 2.3 (ref 0.8–1.2)
LDH SERPL L TO P-CCNC: 243 U/L (ref 110–260)
LYMPHOCYTES # BLD AUTO: 1.8 K/UL (ref 1–4.8)
LYMPHOCYTES NFR BLD: 23.4 % (ref 18–48)
MAGNESIUM SERPL-MCNC: 1.8 MG/DL (ref 1.6–2.6)
MCH RBC QN AUTO: 29.6 PG (ref 27–31)
MCHC RBC AUTO-ENTMCNC: 30.9 G/DL (ref 32–36)
MCV RBC AUTO: 96 FL (ref 82–98)
MONOCYTES # BLD AUTO: 0.7 K/UL (ref 0.3–1)
MONOCYTES NFR BLD: 9 % (ref 4–15)
NEUTROPHILS # BLD AUTO: 4.8 K/UL (ref 1.8–7.7)
NEUTROPHILS NFR BLD: 64.1 % (ref 38–73)
NRBC BLD-RTO: 0 /100 WBC
PHOSPHATE SERPL-MCNC: 3.6 MG/DL (ref 2.7–4.5)
PLATELET # BLD AUTO: 289 K/UL (ref 150–350)
PMV BLD AUTO: 9.9 FL (ref 9.2–12.9)
POCT GLUCOSE: 139 MG/DL (ref 70–110)
POTASSIUM SERPL-SCNC: 4 MMOL/L (ref 3.5–5.1)
PROTHROMBIN TIME: 23.9 SEC (ref 9–12.5)
RBC # BLD AUTO: 2.53 M/UL (ref 4.6–6.2)
SODIUM SERPL-SCNC: 136 MMOL/L (ref 136–145)
WBC # BLD AUTO: 7.53 K/UL (ref 3.9–12.7)

## 2021-01-31 PROCEDURE — 25000003 PHARM REV CODE 250: Performed by: INTERNAL MEDICINE

## 2021-01-31 PROCEDURE — 27000248 HC VAD-ADDITIONAL DAY

## 2021-01-31 PROCEDURE — 93750 INTERROGATION VAD IN PERSON: CPT | Mod: ,,, | Performed by: INTERNAL MEDICINE

## 2021-01-31 PROCEDURE — 25000003 PHARM REV CODE 250: Performed by: NURSE PRACTITIONER

## 2021-01-31 PROCEDURE — 25000003 PHARM REV CODE 250: Performed by: STUDENT IN AN ORGANIZED HEALTH CARE EDUCATION/TRAINING PROGRAM

## 2021-01-31 PROCEDURE — 83615 LACTATE (LD) (LDH) ENZYME: CPT

## 2021-01-31 PROCEDURE — 80048 BASIC METABOLIC PNL TOTAL CA: CPT

## 2021-01-31 PROCEDURE — 99233 PR SUBSEQUENT HOSPITAL CARE,LEVL III: ICD-10-PCS | Mod: ,,, | Performed by: INTERNAL MEDICINE

## 2021-01-31 PROCEDURE — 83735 ASSAY OF MAGNESIUM: CPT

## 2021-01-31 PROCEDURE — 85025 COMPLETE CBC W/AUTO DIFF WBC: CPT

## 2021-01-31 PROCEDURE — 99233 SBSQ HOSP IP/OBS HIGH 50: CPT | Mod: ,,, | Performed by: INTERNAL MEDICINE

## 2021-01-31 PROCEDURE — 25000003 PHARM REV CODE 250: Performed by: HOSPITALIST

## 2021-01-31 PROCEDURE — 85610 PROTHROMBIN TIME: CPT

## 2021-01-31 PROCEDURE — 20600001 HC STEP DOWN PRIVATE ROOM

## 2021-01-31 PROCEDURE — 84100 ASSAY OF PHOSPHORUS: CPT

## 2021-01-31 PROCEDURE — 93750 PR INTERROGATE VENT ASSIST DEV, IN PERSON, W PHYSICIAN ANALYSIS: ICD-10-PCS | Mod: ,,, | Performed by: INTERNAL MEDICINE

## 2021-01-31 PROCEDURE — A4216 STERILE WATER/SALINE, 10 ML: HCPCS | Performed by: STUDENT IN AN ORGANIZED HEALTH CARE EDUCATION/TRAINING PROGRAM

## 2021-01-31 RX ORDER — WARFARIN SODIUM 5 MG/1
5 TABLET ORAL
Status: DISCONTINUED | OUTPATIENT
Start: 2021-01-31 | End: 2021-02-03

## 2021-01-31 RX ORDER — WARFARIN 7.5 MG/1
7.5 TABLET ORAL
Status: DISCONTINUED | OUTPATIENT
Start: 2021-02-01 | End: 2021-02-03

## 2021-01-31 RX ADMIN — ATORVASTATIN CALCIUM 80 MG: 20 TABLET, FILM COATED ORAL at 08:01

## 2021-01-31 RX ADMIN — FERROUS SULFATE TAB EC 325 MG (65 MG FE EQUIVALENT) 325 MG: 325 (65 FE) TABLET DELAYED RESPONSE at 08:01

## 2021-01-31 RX ADMIN — Medication 10 ML: at 12:01

## 2021-01-31 RX ADMIN — Medication 400 MG: at 08:01

## 2021-01-31 RX ADMIN — DOCUSATE SODIUM 200 MG: 50 CAPSULE, LIQUID FILLED ORAL at 08:01

## 2021-01-31 RX ADMIN — WARFARIN SODIUM 5 MG: 5 TABLET ORAL at 05:01

## 2021-01-31 RX ADMIN — ASPIRIN 325 MG: 325 TABLET, COATED ORAL at 08:01

## 2021-01-31 RX ADMIN — Medication 10 ML: at 06:01

## 2021-01-31 RX ADMIN — AMIODARONE HYDROCHLORIDE 200 MG: 200 TABLET ORAL at 08:01

## 2021-01-31 RX ADMIN — SITAGLIPTIN 100 MG: 50 TABLET, FILM COATED ORAL at 08:01

## 2021-01-31 RX ADMIN — PANTOPRAZOLE SODIUM 40 MG: 40 TABLET, DELAYED RELEASE ORAL at 08:01

## 2021-01-31 RX ADMIN — Medication 400 MG: at 03:01

## 2021-01-31 RX ADMIN — ERGOCALCIFEROL 50000 UNITS: 1.25 CAPSULE ORAL at 08:01

## 2021-02-01 DIAGNOSIS — Z95.811 HEART REPLACED BY HEART ASSIST DEVICE: Primary | ICD-10-CM

## 2021-02-01 DIAGNOSIS — E78.5 HYPERLIPIDEMIA, UNSPECIFIED HYPERLIPIDEMIA TYPE: ICD-10-CM

## 2021-02-01 DIAGNOSIS — E03.9 HYPOTHYROIDISM, UNSPECIFIED TYPE: ICD-10-CM

## 2021-02-01 LAB
ALBUMIN SERPL BCP-MCNC: 2.6 G/DL (ref 3.5–5.2)
ALP SERPL-CCNC: 114 U/L (ref 55–135)
ALT SERPL W/O P-5'-P-CCNC: 7 U/L (ref 10–44)
ANION GAP SERPL CALC-SCNC: 9 MMOL/L (ref 8–16)
AST SERPL-CCNC: 16 U/L (ref 10–40)
BASOPHILS # BLD AUTO: 0.03 K/UL (ref 0–0.2)
BASOPHILS NFR BLD: 0.5 % (ref 0–1.9)
BILIRUB DIRECT SERPL-MCNC: 0.6 MG/DL (ref 0.1–0.3)
BILIRUB SERPL-MCNC: 1 MG/DL (ref 0.1–1)
BNP SERPL-MCNC: 596 PG/ML (ref 0–99)
BUN SERPL-MCNC: 16 MG/DL (ref 8–23)
CALCIUM SERPL-MCNC: 8.8 MG/DL (ref 8.7–10.5)
CHLORIDE SERPL-SCNC: 106 MMOL/L (ref 95–110)
CO2 SERPL-SCNC: 21 MMOL/L (ref 23–29)
CREAT SERPL-MCNC: 0.9 MG/DL (ref 0.5–1.4)
CRP SERPL-MCNC: 34.1 MG/L (ref 0–8.2)
DIFFERENTIAL METHOD: ABNORMAL
EOSINOPHIL # BLD AUTO: 0.2 K/UL (ref 0–0.5)
EOSINOPHIL NFR BLD: 3.2 % (ref 0–8)
ERYTHROCYTE [DISTWIDTH] IN BLOOD BY AUTOMATED COUNT: 15.2 % (ref 11.5–14.5)
EST. GFR  (AFRICAN AMERICAN): >60 ML/MIN/1.73 M^2
EST. GFR  (NON AFRICAN AMERICAN): >60 ML/MIN/1.73 M^2
ESTIMATED AVG GLUCOSE: 134 MG/DL (ref 68–131)
GLUCOSE SERPL-MCNC: 96 MG/DL (ref 70–110)
HBA1C MFR BLD: 6.3 % (ref 4–5.6)
HCT VFR BLD AUTO: 25.9 % (ref 40–54)
HGB BLD-MCNC: 7.7 G/DL (ref 14–18)
IMM GRANULOCYTES # BLD AUTO: 0.04 K/UL (ref 0–0.04)
IMM GRANULOCYTES NFR BLD AUTO: 0.6 % (ref 0–0.5)
INR PPP: 2.3 (ref 0.8–1.2)
LDH SERPL L TO P-CCNC: 278 U/L (ref 110–260)
LYMPHOCYTES # BLD AUTO: 1.7 K/UL (ref 1–4.8)
LYMPHOCYTES NFR BLD: 25.5 % (ref 18–48)
MAGNESIUM SERPL-MCNC: 1.8 MG/DL (ref 1.6–2.6)
MAGNESIUM SERPL-MCNC: 2.1 MG/DL (ref 1.6–2.6)
MCH RBC QN AUTO: 29.1 PG (ref 27–31)
MCHC RBC AUTO-ENTMCNC: 29.7 G/DL (ref 32–36)
MCV RBC AUTO: 98 FL (ref 82–98)
MONOCYTES # BLD AUTO: 0.6 K/UL (ref 0.3–1)
MONOCYTES NFR BLD: 9.5 % (ref 4–15)
NEUTROPHILS # BLD AUTO: 4 K/UL (ref 1.8–7.7)
NEUTROPHILS NFR BLD: 60.7 % (ref 38–73)
NRBC BLD-RTO: 0 /100 WBC
PHOSPHATE SERPL-MCNC: 3.4 MG/DL (ref 2.7–4.5)
PLATELET # BLD AUTO: 291 K/UL (ref 150–350)
PMV BLD AUTO: 9.8 FL (ref 9.2–12.9)
POCT GLUCOSE: 133 MG/DL (ref 70–110)
POCT GLUCOSE: 163 MG/DL (ref 70–110)
POTASSIUM SERPL-SCNC: 3.9 MMOL/L (ref 3.5–5.1)
POTASSIUM SERPL-SCNC: 4 MMOL/L (ref 3.5–5.1)
PREALB SERPL-MCNC: 8 MG/DL (ref 20–43)
PROT SERPL-MCNC: 7.8 G/DL (ref 6–8.4)
PROTHROMBIN TIME: 24 SEC (ref 9–12.5)
RBC # BLD AUTO: 2.65 M/UL (ref 4.6–6.2)
SODIUM SERPL-SCNC: 136 MMOL/L (ref 136–145)
WBC # BLD AUTO: 6.6 K/UL (ref 3.9–12.7)

## 2021-02-01 PROCEDURE — 84134 ASSAY OF PREALBUMIN: CPT

## 2021-02-01 PROCEDURE — 25000003 PHARM REV CODE 250: Performed by: HOSPITALIST

## 2021-02-01 PROCEDURE — 83735 ASSAY OF MAGNESIUM: CPT

## 2021-02-01 PROCEDURE — 97535 SELF CARE MNGMENT TRAINING: CPT

## 2021-02-01 PROCEDURE — 27000685 HC LVAD KIT (30 DAY SUPPLY)

## 2021-02-01 PROCEDURE — 25000003 PHARM REV CODE 250: Performed by: INTERNAL MEDICINE

## 2021-02-01 PROCEDURE — 20600001 HC STEP DOWN PRIVATE ROOM

## 2021-02-01 PROCEDURE — 83735 ASSAY OF MAGNESIUM: CPT | Mod: 91

## 2021-02-01 PROCEDURE — 93750 PR INTERROGATE VENT ASSIST DEV, IN PERSON, W PHYSICIAN ANALYSIS: ICD-10-PCS | Mod: ,,, | Performed by: THORACIC SURGERY (CARDIOTHORACIC VASCULAR SURGERY)

## 2021-02-01 PROCEDURE — 84132 ASSAY OF SERUM POTASSIUM: CPT

## 2021-02-01 PROCEDURE — 86140 C-REACTIVE PROTEIN: CPT

## 2021-02-01 PROCEDURE — 63600175 PHARM REV CODE 636 W HCPCS: Performed by: STUDENT IN AN ORGANIZED HEALTH CARE EDUCATION/TRAINING PROGRAM

## 2021-02-01 PROCEDURE — 84100 ASSAY OF PHOSPHORUS: CPT

## 2021-02-01 PROCEDURE — 93750 INTERROGATION VAD IN PERSON: CPT | Mod: ,,, | Performed by: THORACIC SURGERY (CARDIOTHORACIC VASCULAR SURGERY)

## 2021-02-01 PROCEDURE — 99233 SBSQ HOSP IP/OBS HIGH 50: CPT | Mod: 24,25,, | Performed by: THORACIC SURGERY (CARDIOTHORACIC VASCULAR SURGERY)

## 2021-02-01 PROCEDURE — 85610 PROTHROMBIN TIME: CPT

## 2021-02-01 PROCEDURE — 80048 BASIC METABOLIC PNL TOTAL CA: CPT

## 2021-02-01 PROCEDURE — 25000003 PHARM REV CODE 250: Performed by: STUDENT IN AN ORGANIZED HEALTH CARE EDUCATION/TRAINING PROGRAM

## 2021-02-01 PROCEDURE — 27000248 HC VAD-ADDITIONAL DAY

## 2021-02-01 PROCEDURE — 99233 PR SUBSEQUENT HOSPITAL CARE,LEVL III: ICD-10-PCS | Mod: 24,25,, | Performed by: THORACIC SURGERY (CARDIOTHORACIC VASCULAR SURGERY)

## 2021-02-01 PROCEDURE — 63600175 PHARM REV CODE 636 W HCPCS: Performed by: HOSPITALIST

## 2021-02-01 PROCEDURE — 83880 ASSAY OF NATRIURETIC PEPTIDE: CPT

## 2021-02-01 PROCEDURE — 83036 HEMOGLOBIN GLYCOSYLATED A1C: CPT

## 2021-02-01 PROCEDURE — 83615 LACTATE (LD) (LDH) ENZYME: CPT

## 2021-02-01 PROCEDURE — 80076 HEPATIC FUNCTION PANEL: CPT

## 2021-02-01 PROCEDURE — 25000003 PHARM REV CODE 250: Performed by: NURSE PRACTITIONER

## 2021-02-01 PROCEDURE — 85025 COMPLETE CBC W/AUTO DIFF WBC: CPT

## 2021-02-01 PROCEDURE — 94761 N-INVAS EAR/PLS OXIMETRY MLT: CPT

## 2021-02-01 RX ORDER — MAGNESIUM SULFATE HEPTAHYDRATE 40 MG/ML
2 INJECTION, SOLUTION INTRAVENOUS ONCE
Status: COMPLETED | OUTPATIENT
Start: 2021-02-01 | End: 2021-02-01

## 2021-02-01 RX ORDER — FUROSEMIDE 10 MG/ML
80 INJECTION INTRAMUSCULAR; INTRAVENOUS DAILY
Status: DISCONTINUED | OUTPATIENT
Start: 2021-02-01 | End: 2021-02-01

## 2021-02-01 RX ORDER — POTASSIUM CHLORIDE 750 MG/1
10 CAPSULE, EXTENDED RELEASE ORAL ONCE
Status: COMPLETED | OUTPATIENT
Start: 2021-02-01 | End: 2021-02-01

## 2021-02-01 RX ORDER — FUROSEMIDE 10 MG/ML
80 INJECTION INTRAMUSCULAR; INTRAVENOUS
Status: DISCONTINUED | OUTPATIENT
Start: 2021-02-01 | End: 2021-02-02

## 2021-02-01 RX ADMIN — MAGNESIUM SULFATE 2 G: 2 INJECTION INTRAVENOUS at 06:02

## 2021-02-01 RX ADMIN — FUROSEMIDE 80 MG: 10 INJECTION, SOLUTION INTRAMUSCULAR; INTRAVENOUS at 10:02

## 2021-02-01 RX ADMIN — ASPIRIN 325 MG: 325 TABLET, COATED ORAL at 09:02

## 2021-02-01 RX ADMIN — Medication 400 MG: at 04:02

## 2021-02-01 RX ADMIN — AMIODARONE HYDROCHLORIDE 200 MG: 200 TABLET ORAL at 09:02

## 2021-02-01 RX ADMIN — PANTOPRAZOLE SODIUM 40 MG: 40 TABLET, DELAYED RELEASE ORAL at 09:02

## 2021-02-01 RX ADMIN — POLYETHYLENE GLYCOL 3350 17 G: 17 POWDER, FOR SOLUTION ORAL at 09:02

## 2021-02-01 RX ADMIN — POTASSIUM CHLORIDE 10 MEQ: 10 CAPSULE, COATED, EXTENDED RELEASE ORAL at 06:02

## 2021-02-01 RX ADMIN — ATORVASTATIN CALCIUM 80 MG: 20 TABLET, FILM COATED ORAL at 09:02

## 2021-02-01 RX ADMIN — FERROUS SULFATE TAB EC 325 MG (65 MG FE EQUIVALENT) 325 MG: 325 (65 FE) TABLET DELAYED RESPONSE at 08:02

## 2021-02-01 RX ADMIN — Medication 400 MG: at 09:02

## 2021-02-01 RX ADMIN — DOCUSATE SODIUM 200 MG: 50 CAPSULE, LIQUID FILLED ORAL at 08:02

## 2021-02-01 RX ADMIN — Medication 400 MG: at 08:02

## 2021-02-01 RX ADMIN — WARFARIN SODIUM 7.5 MG: 7.5 TABLET ORAL at 04:02

## 2021-02-01 RX ADMIN — SITAGLIPTIN 100 MG: 50 TABLET, FILM COATED ORAL at 09:02

## 2021-02-01 RX ADMIN — FUROSEMIDE 80 MG: 10 INJECTION, SOLUTION INTRAMUSCULAR; INTRAVENOUS at 09:02

## 2021-02-01 RX ADMIN — FERROUS SULFATE TAB EC 325 MG (65 MG FE EQUIVALENT) 325 MG: 325 (65 FE) TABLET DELAYED RESPONSE at 09:02

## 2021-02-02 LAB
ANION GAP SERPL CALC-SCNC: 10 MMOL/L (ref 8–16)
ANISOCYTOSIS BLD QL SMEAR: SLIGHT
BASOPHILS # BLD AUTO: 0.03 K/UL (ref 0–0.2)
BASOPHILS NFR BLD: 0.4 % (ref 0–1.9)
BUN SERPL-MCNC: 18 MG/DL (ref 8–23)
CALCIUM SERPL-MCNC: 8.9 MG/DL (ref 8.7–10.5)
CHLORIDE SERPL-SCNC: 103 MMOL/L (ref 95–110)
CO2 SERPL-SCNC: 25 MMOL/L (ref 23–29)
CREAT SERPL-MCNC: 1 MG/DL (ref 0.5–1.4)
DIFFERENTIAL METHOD: ABNORMAL
EOSINOPHIL # BLD AUTO: 0.3 K/UL (ref 0–0.5)
EOSINOPHIL NFR BLD: 3.8 % (ref 0–8)
ERYTHROCYTE [DISTWIDTH] IN BLOOD BY AUTOMATED COUNT: 15.5 % (ref 11.5–14.5)
EST. GFR  (AFRICAN AMERICAN): >60 ML/MIN/1.73 M^2
EST. GFR  (NON AFRICAN AMERICAN): >60 ML/MIN/1.73 M^2
GLUCOSE SERPL-MCNC: 116 MG/DL (ref 70–110)
HCT VFR BLD AUTO: 26.8 % (ref 40–54)
HGB BLD-MCNC: 8.5 G/DL (ref 14–18)
HYPOCHROMIA BLD QL SMEAR: ABNORMAL
IMM GRANULOCYTES # BLD AUTO: 0.06 K/UL (ref 0–0.04)
IMM GRANULOCYTES NFR BLD AUTO: 0.8 % (ref 0–0.5)
INR PPP: 2.4 (ref 0.8–1.2)
LDH SERPL L TO P-CCNC: 234 U/L (ref 110–260)
LYMPHOCYTES # BLD AUTO: 1.8 K/UL (ref 1–4.8)
LYMPHOCYTES NFR BLD: 23.8 % (ref 18–48)
MAGNESIUM SERPL-MCNC: 1.8 MG/DL (ref 1.6–2.6)
MCH RBC QN AUTO: 30.2 PG (ref 27–31)
MCHC RBC AUTO-ENTMCNC: 31.7 G/DL (ref 32–36)
MCV RBC AUTO: 95 FL (ref 82–98)
MONOCYTES # BLD AUTO: 0.7 K/UL (ref 0.3–1)
MONOCYTES NFR BLD: 9 % (ref 4–15)
NEUTROPHILS # BLD AUTO: 4.6 K/UL (ref 1.8–7.7)
NEUTROPHILS NFR BLD: 62.2 % (ref 38–73)
NRBC BLD-RTO: 0 /100 WBC
PHOSPHATE SERPL-MCNC: 3.8 MG/DL (ref 2.7–4.5)
PLATELET # BLD AUTO: 308 K/UL (ref 150–350)
PLATELET BLD QL SMEAR: ABNORMAL
PMV BLD AUTO: 9.7 FL (ref 9.2–12.9)
POCT GLUCOSE: 119 MG/DL (ref 70–110)
POIKILOCYTOSIS BLD QL SMEAR: SLIGHT
POLYCHROMASIA BLD QL SMEAR: ABNORMAL
POTASSIUM SERPL-SCNC: 3.6 MMOL/L (ref 3.5–5.1)
PROTHROMBIN TIME: 24.8 SEC (ref 9–12.5)
RBC # BLD AUTO: 2.81 M/UL (ref 4.6–6.2)
SODIUM SERPL-SCNC: 138 MMOL/L (ref 136–145)
WBC # BLD AUTO: 7.36 K/UL (ref 3.9–12.7)

## 2021-02-02 PROCEDURE — A4216 STERILE WATER/SALINE, 10 ML: HCPCS | Performed by: STUDENT IN AN ORGANIZED HEALTH CARE EDUCATION/TRAINING PROGRAM

## 2021-02-02 PROCEDURE — 25000003 PHARM REV CODE 250: Performed by: STUDENT IN AN ORGANIZED HEALTH CARE EDUCATION/TRAINING PROGRAM

## 2021-02-02 PROCEDURE — 93750 INTERROGATION VAD IN PERSON: CPT | Mod: 77,,, | Performed by: THORACIC SURGERY (CARDIOTHORACIC VASCULAR SURGERY)

## 2021-02-02 PROCEDURE — 83615 LACTATE (LD) (LDH) ENZYME: CPT

## 2021-02-02 PROCEDURE — 93750 PR INTERROGATE VENT ASSIST DEV, IN PERSON, W PHYSICIAN ANALYSIS: ICD-10-PCS | Mod: 77,,, | Performed by: THORACIC SURGERY (CARDIOTHORACIC VASCULAR SURGERY)

## 2021-02-02 PROCEDURE — 25000003 PHARM REV CODE 250: Performed by: INTERNAL MEDICINE

## 2021-02-02 PROCEDURE — 99233 SBSQ HOSP IP/OBS HIGH 50: CPT | Mod: 24,25,, | Performed by: THORACIC SURGERY (CARDIOTHORACIC VASCULAR SURGERY)

## 2021-02-02 PROCEDURE — 83735 ASSAY OF MAGNESIUM: CPT

## 2021-02-02 PROCEDURE — 99233 PR SUBSEQUENT HOSPITAL CARE,LEVL III: ICD-10-PCS | Mod: 24,25,, | Performed by: THORACIC SURGERY (CARDIOTHORACIC VASCULAR SURGERY)

## 2021-02-02 PROCEDURE — 27000248 HC VAD-ADDITIONAL DAY

## 2021-02-02 PROCEDURE — 20600001 HC STEP DOWN PRIVATE ROOM

## 2021-02-02 PROCEDURE — 80048 BASIC METABOLIC PNL TOTAL CA: CPT

## 2021-02-02 PROCEDURE — 97530 THERAPEUTIC ACTIVITIES: CPT | Mod: CQ

## 2021-02-02 PROCEDURE — 25000003 PHARM REV CODE 250: Performed by: HOSPITALIST

## 2021-02-02 PROCEDURE — 99232 SBSQ HOSP IP/OBS MODERATE 35: CPT | Mod: ,,, | Performed by: NURSE PRACTITIONER

## 2021-02-02 PROCEDURE — 27000685 HC LVAD KIT (30 DAY SUPPLY)

## 2021-02-02 PROCEDURE — 63600175 PHARM REV CODE 636 W HCPCS: Performed by: HOSPITALIST

## 2021-02-02 PROCEDURE — 84100 ASSAY OF PHOSPHORUS: CPT

## 2021-02-02 PROCEDURE — 85610 PROTHROMBIN TIME: CPT

## 2021-02-02 PROCEDURE — 99232 PR SUBSEQUENT HOSPITAL CARE,LEVL II: ICD-10-PCS | Mod: ,,, | Performed by: NURSE PRACTITIONER

## 2021-02-02 PROCEDURE — 94761 N-INVAS EAR/PLS OXIMETRY MLT: CPT

## 2021-02-02 PROCEDURE — 85025 COMPLETE CBC W/AUTO DIFF WBC: CPT

## 2021-02-02 PROCEDURE — 25000003 PHARM REV CODE 250: Performed by: NURSE PRACTITIONER

## 2021-02-02 RX ORDER — POTASSIUM CHLORIDE 20 MEQ/1
40 TABLET, EXTENDED RELEASE ORAL ONCE
Status: COMPLETED | OUTPATIENT
Start: 2021-02-02 | End: 2021-02-02

## 2021-02-02 RX ORDER — FUROSEMIDE 80 MG/1
80 TABLET ORAL DAILY
Status: DISCONTINUED | OUTPATIENT
Start: 2021-02-02 | End: 2021-02-04 | Stop reason: HOSPADM

## 2021-02-02 RX ORDER — LIDOCAINE HYDROCHLORIDE 10 MG/ML
5 INJECTION INFILTRATION; PERINEURAL ONCE
Status: DISCONTINUED | OUTPATIENT
Start: 2021-02-02 | End: 2021-02-04 | Stop reason: HOSPADM

## 2021-02-02 RX ADMIN — PANTOPRAZOLE SODIUM 40 MG: 40 TABLET, DELAYED RELEASE ORAL at 09:02

## 2021-02-02 RX ADMIN — FUROSEMIDE 80 MG: 10 INJECTION, SOLUTION INTRAMUSCULAR; INTRAVENOUS at 09:02

## 2021-02-02 RX ADMIN — ASPIRIN 325 MG: 325 TABLET, COATED ORAL at 09:02

## 2021-02-02 RX ADMIN — AMIODARONE HYDROCHLORIDE 200 MG: 200 TABLET ORAL at 09:02

## 2021-02-02 RX ADMIN — Medication 400 MG: at 09:02

## 2021-02-02 RX ADMIN — Medication 400 MG: at 04:02

## 2021-02-02 RX ADMIN — WARFARIN SODIUM 5 MG: 5 TABLET ORAL at 04:02

## 2021-02-02 RX ADMIN — FUROSEMIDE 80 MG: 80 TABLET ORAL at 01:02

## 2021-02-02 RX ADMIN — FERROUS SULFATE TAB EC 325 MG (65 MG FE EQUIVALENT) 325 MG: 325 (65 FE) TABLET DELAYED RESPONSE at 08:02

## 2021-02-02 RX ADMIN — SITAGLIPTIN 100 MG: 50 TABLET, FILM COATED ORAL at 09:02

## 2021-02-02 RX ADMIN — ATORVASTATIN CALCIUM 80 MG: 20 TABLET, FILM COATED ORAL at 09:02

## 2021-02-02 RX ADMIN — POTASSIUM CHLORIDE 40 MEQ: 1500 TABLET, EXTENDED RELEASE ORAL at 09:02

## 2021-02-02 RX ADMIN — Medication 400 MG: at 08:02

## 2021-02-02 RX ADMIN — FERROUS SULFATE TAB EC 325 MG (65 MG FE EQUIVALENT) 325 MG: 325 (65 FE) TABLET DELAYED RESPONSE at 09:02

## 2021-02-02 RX ADMIN — DOCUSATE SODIUM 200 MG: 50 CAPSULE, LIQUID FILLED ORAL at 08:02

## 2021-02-02 RX ADMIN — Medication 10 ML: at 01:02

## 2021-02-03 LAB
ALBUMIN SERPL BCP-MCNC: 2.8 G/DL (ref 3.5–5.2)
ALP SERPL-CCNC: 122 U/L (ref 55–135)
ALT SERPL W/O P-5'-P-CCNC: 8 U/L (ref 10–44)
ANION GAP SERPL CALC-SCNC: 9 MMOL/L (ref 8–16)
AST SERPL-CCNC: 16 U/L (ref 10–40)
BASOPHILS # BLD AUTO: 0.03 K/UL (ref 0–0.2)
BASOPHILS NFR BLD: 0.4 % (ref 0–1.9)
BILIRUB DIRECT SERPL-MCNC: 0.6 MG/DL (ref 0.1–0.3)
BILIRUB SERPL-MCNC: 1 MG/DL (ref 0.1–1)
BNP SERPL-MCNC: 279 PG/ML (ref 0–99)
BUN SERPL-MCNC: 22 MG/DL (ref 8–23)
CALCIUM SERPL-MCNC: 8.9 MG/DL (ref 8.7–10.5)
CHLORIDE SERPL-SCNC: 104 MMOL/L (ref 95–110)
CO2 SERPL-SCNC: 26 MMOL/L (ref 23–29)
CREAT SERPL-MCNC: 1.1 MG/DL (ref 0.5–1.4)
CRP SERPL-MCNC: 18.7 MG/L (ref 0–8.2)
DIFFERENTIAL METHOD: ABNORMAL
EOSINOPHIL # BLD AUTO: 0.3 K/UL (ref 0–0.5)
EOSINOPHIL NFR BLD: 3.5 % (ref 0–8)
ERYTHROCYTE [DISTWIDTH] IN BLOOD BY AUTOMATED COUNT: 15.8 % (ref 11.5–14.5)
EST. GFR  (AFRICAN AMERICAN): >60 ML/MIN/1.73 M^2
EST. GFR  (NON AFRICAN AMERICAN): >60 ML/MIN/1.73 M^2
GLUCOSE SERPL-MCNC: 117 MG/DL (ref 70–110)
HCT VFR BLD AUTO: 27.8 % (ref 40–54)
HGB BLD-MCNC: 8.4 G/DL (ref 14–18)
IMM GRANULOCYTES # BLD AUTO: 0.02 K/UL (ref 0–0.04)
IMM GRANULOCYTES NFR BLD AUTO: 0.3 % (ref 0–0.5)
INR PPP: 2.8 (ref 0.8–1.2)
LDH SERPL L TO P-CCNC: 225 U/L (ref 110–260)
LYMPHOCYTES # BLD AUTO: 1.8 K/UL (ref 1–4.8)
LYMPHOCYTES NFR BLD: 24.3 % (ref 18–48)
MAGNESIUM SERPL-MCNC: 1.9 MG/DL (ref 1.6–2.6)
MCH RBC QN AUTO: 29.3 PG (ref 27–31)
MCHC RBC AUTO-ENTMCNC: 30.2 G/DL (ref 32–36)
MCV RBC AUTO: 97 FL (ref 82–98)
MONOCYTES # BLD AUTO: 0.6 K/UL (ref 0.3–1)
MONOCYTES NFR BLD: 8.7 % (ref 4–15)
NEUTROPHILS # BLD AUTO: 4.6 K/UL (ref 1.8–7.7)
NEUTROPHILS NFR BLD: 62.8 % (ref 38–73)
NRBC BLD-RTO: 0 /100 WBC
PHOSPHATE SERPL-MCNC: 4.1 MG/DL (ref 2.7–4.5)
PLATELET # BLD AUTO: 314 K/UL (ref 150–350)
PMV BLD AUTO: 9.7 FL (ref 9.2–12.9)
POCT GLUCOSE: 100 MG/DL (ref 70–110)
POCT GLUCOSE: 104 MG/DL (ref 70–110)
POTASSIUM SERPL-SCNC: 3.7 MMOL/L (ref 3.5–5.1)
PREALB SERPL-MCNC: 11 MG/DL (ref 20–43)
PROT SERPL-MCNC: 8.3 G/DL (ref 6–8.4)
PROTHROMBIN TIME: 28.3 SEC (ref 9–12.5)
RBC # BLD AUTO: 2.87 M/UL (ref 4.6–6.2)
SODIUM SERPL-SCNC: 139 MMOL/L (ref 136–145)
WBC # BLD AUTO: 7.38 K/UL (ref 3.9–12.7)

## 2021-02-03 PROCEDURE — 25000003 PHARM REV CODE 250: Performed by: STUDENT IN AN ORGANIZED HEALTH CARE EDUCATION/TRAINING PROGRAM

## 2021-02-03 PROCEDURE — 97110 THERAPEUTIC EXERCISES: CPT

## 2021-02-03 PROCEDURE — 93750 INTERROGATION VAD IN PERSON: CPT | Mod: ,,, | Performed by: THORACIC SURGERY (CARDIOTHORACIC VASCULAR SURGERY)

## 2021-02-03 PROCEDURE — 85610 PROTHROMBIN TIME: CPT

## 2021-02-03 PROCEDURE — 86140 C-REACTIVE PROTEIN: CPT

## 2021-02-03 PROCEDURE — 83615 LACTATE (LD) (LDH) ENZYME: CPT

## 2021-02-03 PROCEDURE — 25000003 PHARM REV CODE 250: Performed by: INTERNAL MEDICINE

## 2021-02-03 PROCEDURE — 85025 COMPLETE CBC W/AUTO DIFF WBC: CPT

## 2021-02-03 PROCEDURE — 99233 SBSQ HOSP IP/OBS HIGH 50: CPT | Mod: 24,25,, | Performed by: THORACIC SURGERY (CARDIOTHORACIC VASCULAR SURGERY)

## 2021-02-03 PROCEDURE — 94760 N-INVAS EAR/PLS OXIMETRY 1: CPT

## 2021-02-03 PROCEDURE — 80048 BASIC METABOLIC PNL TOTAL CA: CPT

## 2021-02-03 PROCEDURE — 84100 ASSAY OF PHOSPHORUS: CPT

## 2021-02-03 PROCEDURE — 84134 ASSAY OF PREALBUMIN: CPT

## 2021-02-03 PROCEDURE — 25000003 PHARM REV CODE 250: Performed by: HOSPITALIST

## 2021-02-03 PROCEDURE — 20600001 HC STEP DOWN PRIVATE ROOM

## 2021-02-03 PROCEDURE — 27000248 HC VAD-ADDITIONAL DAY

## 2021-02-03 PROCEDURE — 80076 HEPATIC FUNCTION PANEL: CPT

## 2021-02-03 PROCEDURE — 99233 PR SUBSEQUENT HOSPITAL CARE,LEVL III: ICD-10-PCS | Mod: 24,25,, | Performed by: THORACIC SURGERY (CARDIOTHORACIC VASCULAR SURGERY)

## 2021-02-03 PROCEDURE — 93750 PR INTERROGATE VENT ASSIST DEV, IN PERSON, W PHYSICIAN ANALYSIS: ICD-10-PCS | Mod: ,,, | Performed by: THORACIC SURGERY (CARDIOTHORACIC VASCULAR SURGERY)

## 2021-02-03 PROCEDURE — 83880 ASSAY OF NATRIURETIC PEPTIDE: CPT

## 2021-02-03 PROCEDURE — 83735 ASSAY OF MAGNESIUM: CPT

## 2021-02-03 PROCEDURE — A4216 STERILE WATER/SALINE, 10 ML: HCPCS | Performed by: STUDENT IN AN ORGANIZED HEALTH CARE EDUCATION/TRAINING PROGRAM

## 2021-02-03 PROCEDURE — 25000003 PHARM REV CODE 250: Performed by: NURSE PRACTITIONER

## 2021-02-03 RX ORDER — WARFARIN SODIUM 5 MG/1
5 TABLET ORAL DAILY
Status: DISCONTINUED | OUTPATIENT
Start: 2021-02-03 | End: 2021-02-04

## 2021-02-03 RX ORDER — POTASSIUM CHLORIDE 20 MEQ/1
40 TABLET, EXTENDED RELEASE ORAL ONCE
Status: DISCONTINUED | OUTPATIENT
Start: 2021-02-03 | End: 2021-02-03

## 2021-02-03 RX ORDER — POTASSIUM CHLORIDE 20 MEQ/1
40 TABLET, EXTENDED RELEASE ORAL DAILY
Status: DISCONTINUED | OUTPATIENT
Start: 2021-02-03 | End: 2021-02-04

## 2021-02-03 RX ADMIN — AMIODARONE HYDROCHLORIDE 200 MG: 200 TABLET ORAL at 08:02

## 2021-02-03 RX ADMIN — FERROUS SULFATE TAB EC 325 MG (65 MG FE EQUIVALENT) 325 MG: 325 (65 FE) TABLET DELAYED RESPONSE at 08:02

## 2021-02-03 RX ADMIN — PANTOPRAZOLE SODIUM 40 MG: 40 TABLET, DELAYED RELEASE ORAL at 08:02

## 2021-02-03 RX ADMIN — Medication 400 MG: at 08:02

## 2021-02-03 RX ADMIN — ASPIRIN 325 MG: 325 TABLET, COATED ORAL at 08:02

## 2021-02-03 RX ADMIN — SITAGLIPTIN 100 MG: 50 TABLET, FILM COATED ORAL at 08:02

## 2021-02-03 RX ADMIN — DOCUSATE SODIUM 200 MG: 50 CAPSULE, LIQUID FILLED ORAL at 08:02

## 2021-02-03 RX ADMIN — WARFARIN SODIUM 5 MG: 5 TABLET ORAL at 05:02

## 2021-02-03 RX ADMIN — ATORVASTATIN CALCIUM 80 MG: 20 TABLET, FILM COATED ORAL at 08:02

## 2021-02-03 RX ADMIN — Medication 400 MG: at 02:02

## 2021-02-03 RX ADMIN — POTASSIUM CHLORIDE 40 MEQ: 1500 TABLET, EXTENDED RELEASE ORAL at 10:02

## 2021-02-03 RX ADMIN — FUROSEMIDE 80 MG: 80 TABLET ORAL at 08:02

## 2021-02-03 RX ADMIN — Medication 10 ML: at 12:02

## 2021-02-03 RX ADMIN — Medication 10 ML: at 05:02

## 2021-02-04 ENCOUNTER — ANTI-COAG VISIT (OUTPATIENT)
Dept: CARDIOLOGY | Facility: CLINIC | Age: 66
End: 2021-02-04

## 2021-02-04 VITALS
OXYGEN SATURATION: 99 % | TEMPERATURE: 98 F | DIASTOLIC BLOOD PRESSURE: 77 MMHG | BODY MASS INDEX: 22.12 KG/M2 | HEIGHT: 74 IN | WEIGHT: 172.38 LBS | HEART RATE: 92 BPM | SYSTOLIC BLOOD PRESSURE: 114 MMHG | RESPIRATION RATE: 16 BRPM

## 2021-02-04 DIAGNOSIS — Z95.811 LVAD (LEFT VENTRICULAR ASSIST DEVICE) PRESENT: Primary | ICD-10-CM

## 2021-02-04 LAB
ANION GAP SERPL CALC-SCNC: 8 MMOL/L (ref 8–16)
BASOPHILS # BLD AUTO: 0.05 K/UL (ref 0–0.2)
BASOPHILS NFR BLD: 0.7 % (ref 0–1.9)
BUN SERPL-MCNC: 21 MG/DL (ref 8–23)
CALCIUM SERPL-MCNC: 8.7 MG/DL (ref 8.7–10.5)
CHLORIDE SERPL-SCNC: 105 MMOL/L (ref 95–110)
CO2 SERPL-SCNC: 26 MMOL/L (ref 23–29)
CREAT SERPL-MCNC: 1.1 MG/DL (ref 0.5–1.4)
DIFFERENTIAL METHOD: ABNORMAL
EOSINOPHIL # BLD AUTO: 0.2 K/UL (ref 0–0.5)
EOSINOPHIL NFR BLD: 3.3 % (ref 0–8)
ERYTHROCYTE [DISTWIDTH] IN BLOOD BY AUTOMATED COUNT: 15.9 % (ref 11.5–14.5)
EST. GFR  (AFRICAN AMERICAN): >60 ML/MIN/1.73 M^2
EST. GFR  (NON AFRICAN AMERICAN): >60 ML/MIN/1.73 M^2
GLUCOSE SERPL-MCNC: 117 MG/DL (ref 70–110)
HCT VFR BLD AUTO: 27.2 % (ref 40–54)
HGB BLD-MCNC: 8.4 G/DL (ref 14–18)
IMM GRANULOCYTES # BLD AUTO: 0.02 K/UL (ref 0–0.04)
IMM GRANULOCYTES NFR BLD AUTO: 0.3 % (ref 0–0.5)
INR PPP: 3.1 (ref 0.8–1.2)
LDH SERPL L TO P-CCNC: 270 U/L (ref 110–260)
LYMPHOCYTES # BLD AUTO: 2 K/UL (ref 1–4.8)
LYMPHOCYTES NFR BLD: 26.5 % (ref 18–48)
MAGNESIUM SERPL-MCNC: 1.9 MG/DL (ref 1.6–2.6)
MCH RBC QN AUTO: 29.7 PG (ref 27–31)
MCHC RBC AUTO-ENTMCNC: 30.9 G/DL (ref 32–36)
MCV RBC AUTO: 96 FL (ref 82–98)
MONOCYTES # BLD AUTO: 0.7 K/UL (ref 0.3–1)
MONOCYTES NFR BLD: 9 % (ref 4–15)
NEUTROPHILS # BLD AUTO: 4.4 K/UL (ref 1.8–7.7)
NEUTROPHILS NFR BLD: 60.2 % (ref 38–73)
NRBC BLD-RTO: 0 /100 WBC
PHOSPHATE SERPL-MCNC: 3.8 MG/DL (ref 2.7–4.5)
PLATELET # BLD AUTO: 288 K/UL (ref 150–350)
PMV BLD AUTO: 9.3 FL (ref 9.2–12.9)
POCT GLUCOSE: 104 MG/DL (ref 70–110)
POCT GLUCOSE: 119 MG/DL (ref 70–110)
POTASSIUM SERPL-SCNC: 4.1 MMOL/L (ref 3.5–5.1)
PROTHROMBIN TIME: 31.9 SEC (ref 9–12.5)
RBC # BLD AUTO: 2.83 M/UL (ref 4.6–6.2)
SODIUM SERPL-SCNC: 139 MMOL/L (ref 136–145)
WBC # BLD AUTO: 7.35 K/UL (ref 3.9–12.7)

## 2021-02-04 PROCEDURE — 84100 ASSAY OF PHOSPHORUS: CPT

## 2021-02-04 PROCEDURE — 94761 N-INVAS EAR/PLS OXIMETRY MLT: CPT

## 2021-02-04 PROCEDURE — 99233 SBSQ HOSP IP/OBS HIGH 50: CPT | Mod: 24,25,, | Performed by: THORACIC SURGERY (CARDIOTHORACIC VASCULAR SURGERY)

## 2021-02-04 PROCEDURE — 83615 LACTATE (LD) (LDH) ENZYME: CPT

## 2021-02-04 PROCEDURE — 80048 BASIC METABOLIC PNL TOTAL CA: CPT

## 2021-02-04 PROCEDURE — 93750 PR INTERROGATE VENT ASSIST DEV, IN PERSON, W PHYSICIAN ANALYSIS: ICD-10-PCS | Mod: ,,, | Performed by: THORACIC SURGERY (CARDIOTHORACIC VASCULAR SURGERY)

## 2021-02-04 PROCEDURE — 85025 COMPLETE CBC W/AUTO DIFF WBC: CPT

## 2021-02-04 PROCEDURE — 25000003 PHARM REV CODE 250: Performed by: INTERNAL MEDICINE

## 2021-02-04 PROCEDURE — 25000003 PHARM REV CODE 250: Performed by: STUDENT IN AN ORGANIZED HEALTH CARE EDUCATION/TRAINING PROGRAM

## 2021-02-04 PROCEDURE — 99233 PR SUBSEQUENT HOSPITAL CARE,LEVL III: ICD-10-PCS | Mod: 24,25,, | Performed by: THORACIC SURGERY (CARDIOTHORACIC VASCULAR SURGERY)

## 2021-02-04 PROCEDURE — 25000003 PHARM REV CODE 250: Performed by: HOSPITALIST

## 2021-02-04 PROCEDURE — 94760 N-INVAS EAR/PLS OXIMETRY 1: CPT

## 2021-02-04 PROCEDURE — 83735 ASSAY OF MAGNESIUM: CPT

## 2021-02-04 PROCEDURE — 93750 INTERROGATION VAD IN PERSON: CPT | Mod: ,,, | Performed by: THORACIC SURGERY (CARDIOTHORACIC VASCULAR SURGERY)

## 2021-02-04 PROCEDURE — 27000248 HC VAD-ADDITIONAL DAY

## 2021-02-04 PROCEDURE — 25000003 PHARM REV CODE 250: Performed by: NURSE PRACTITIONER

## 2021-02-04 PROCEDURE — 97803 MED NUTRITION INDIV SUBSEQ: CPT

## 2021-02-04 PROCEDURE — 85610 PROTHROMBIN TIME: CPT

## 2021-02-04 RX ORDER — FUROSEMIDE 80 MG/1
80 TABLET ORAL DAILY
Qty: 90 TABLET | Refills: 3 | Status: SHIPPED | OUTPATIENT
Start: 2021-02-05 | End: 2021-02-11 | Stop reason: SDUPTHER

## 2021-02-04 RX ORDER — WARFARIN SODIUM 5 MG/1
5 TABLET ORAL DAILY
Status: DISCONTINUED | OUTPATIENT
Start: 2021-02-05 | End: 2021-02-04 | Stop reason: HOSPADM

## 2021-02-04 RX ORDER — AMIODARONE HYDROCHLORIDE 200 MG/1
200 TABLET ORAL DAILY
Qty: 90 TABLET | Refills: 3 | Status: SHIPPED | OUTPATIENT
Start: 2021-02-05 | End: 2021-02-11

## 2021-02-04 RX ORDER — ATORVASTATIN CALCIUM 80 MG/1
80 TABLET, FILM COATED ORAL DAILY
Qty: 90 TABLET | Refills: 3 | Status: SHIPPED | OUTPATIENT
Start: 2021-02-05 | End: 2022-02-21

## 2021-02-04 RX ORDER — WARFARIN SODIUM 5 MG/1
TABLET ORAL
Qty: 90 TABLET | Refills: 3 | Status: ON HOLD | OUTPATIENT
Start: 2021-02-04 | End: 2022-02-17 | Stop reason: SDUPTHER

## 2021-02-04 RX ORDER — WARFARIN 2.5 MG/1
2.5 TABLET ORAL ONCE
Status: DISCONTINUED | OUTPATIENT
Start: 2021-02-04 | End: 2021-02-04 | Stop reason: HOSPADM

## 2021-02-04 RX ORDER — ASPIRIN 325 MG
325 TABLET ORAL DAILY
Qty: 90 TABLET | Refills: 3 | Status: ON HOLD | OUTPATIENT
Start: 2021-02-05 | End: 2022-02-17 | Stop reason: HOSPADM

## 2021-02-04 RX ORDER — POTASSIUM CHLORIDE 20 MEQ/1
20 TABLET, EXTENDED RELEASE ORAL ONCE
Status: COMPLETED | OUTPATIENT
Start: 2021-02-04 | End: 2021-02-04

## 2021-02-04 RX ORDER — POLYETHYLENE GLYCOL 3350 17 G/17G
17 POWDER, FOR SOLUTION ORAL DAILY PRN
Qty: 10 PACKET | Refills: 0 | Status: ON HOLD | OUTPATIENT
Start: 2021-02-04 | End: 2022-09-14

## 2021-02-04 RX ORDER — LANOLIN ALCOHOL/MO/W.PET/CERES
400 CREAM (GRAM) TOPICAL 3 TIMES DAILY
Qty: 90 TABLET | Refills: 1 | Status: SHIPPED | OUTPATIENT
Start: 2021-02-04 | End: 2021-05-04

## 2021-02-04 RX ADMIN — FUROSEMIDE 80 MG: 80 TABLET ORAL at 09:02

## 2021-02-04 RX ADMIN — SITAGLIPTIN 100 MG: 50 TABLET, FILM COATED ORAL at 11:02

## 2021-02-04 RX ADMIN — AMIODARONE HYDROCHLORIDE 200 MG: 200 TABLET ORAL at 09:02

## 2021-02-04 RX ADMIN — Medication 400 MG: at 09:02

## 2021-02-04 RX ADMIN — PANTOPRAZOLE SODIUM 40 MG: 40 TABLET, DELAYED RELEASE ORAL at 09:02

## 2021-02-04 RX ADMIN — POLYETHYLENE GLYCOL 3350 17 G: 17 POWDER, FOR SOLUTION ORAL at 09:02

## 2021-02-04 RX ADMIN — ASPIRIN 325 MG: 325 TABLET, COATED ORAL at 09:02

## 2021-02-04 RX ADMIN — Medication 400 MG: at 04:02

## 2021-02-04 RX ADMIN — POTASSIUM CHLORIDE 20 MEQ: 1500 TABLET, EXTENDED RELEASE ORAL at 11:02

## 2021-02-04 RX ADMIN — ATORVASTATIN CALCIUM 80 MG: 20 TABLET, FILM COATED ORAL at 09:02

## 2021-02-04 RX ADMIN — FERROUS SULFATE TAB EC 325 MG (65 MG FE EQUIVALENT) 325 MG: 325 (65 FE) TABLET DELAYED RESPONSE at 09:02

## 2021-02-05 ENCOUNTER — TELEPHONE (OUTPATIENT)
Dept: TRANSPLANT | Facility: CLINIC | Age: 66
End: 2021-02-05

## 2021-02-05 ENCOUNTER — TELEPHONE (OUTPATIENT)
Dept: CARDIOTHORACIC SURGERY | Facility: CLINIC | Age: 66
End: 2021-02-05

## 2021-02-05 ENCOUNTER — PATIENT OUTREACH (OUTPATIENT)
Dept: ADMINISTRATIVE | Facility: CLINIC | Age: 66
End: 2021-02-05

## 2021-02-05 DIAGNOSIS — Z95.811 LVAD (LEFT VENTRICULAR ASSIST DEVICE) PRESENT: Primary | ICD-10-CM

## 2021-02-06 ENCOUNTER — TELEPHONE (OUTPATIENT)
Dept: TRANSPLANT | Facility: CLINIC | Age: 66
End: 2021-02-06

## 2021-02-08 ENCOUNTER — TELEPHONE (OUTPATIENT)
Dept: TRANSPLANT | Facility: CLINIC | Age: 66
End: 2021-02-08

## 2021-02-09 ENCOUNTER — ANTI-COAG VISIT (OUTPATIENT)
Dept: CARDIOLOGY | Facility: CLINIC | Age: 66
End: 2021-02-09
Payer: MEDICARE

## 2021-02-09 DIAGNOSIS — Z95.811 LVAD (LEFT VENTRICULAR ASSIST DEVICE) PRESENT: Primary | ICD-10-CM

## 2021-02-09 LAB — INR PPP: 3.7

## 2021-02-09 PROCEDURE — 93793 ANTICOAG MGMT PT WARFARIN: CPT | Mod: S$GLB,,,

## 2021-02-09 PROCEDURE — 93793 PR ANTICOAGULANT MGMT FOR PT TAKING WARFARIN: ICD-10-PCS | Mod: S$GLB,,,

## 2021-02-11 ENCOUNTER — HOSPITAL ENCOUNTER (OUTPATIENT)
Dept: RADIOLOGY | Facility: HOSPITAL | Age: 66
Discharge: HOME | End: 2021-02-11
Attending: THORACIC SURGERY (CARDIOTHORACIC VASCULAR SURGERY)
Payer: MEDICARE

## 2021-02-11 ENCOUNTER — ANTI-COAG VISIT (OUTPATIENT)
Dept: CARDIOLOGY | Facility: CLINIC | Age: 66
End: 2021-02-11
Payer: MEDICARE

## 2021-02-11 ENCOUNTER — OFFICE VISIT (OUTPATIENT)
Dept: TRANSPLANT | Facility: CLINIC | Age: 66
End: 2021-02-11
Attending: INTERNAL MEDICINE
Payer: MEDICARE

## 2021-02-11 ENCOUNTER — OFFICE VISIT (OUTPATIENT)
Dept: CARDIOTHORACIC SURGERY | Facility: CLINIC | Age: 66
End: 2021-02-11
Payer: MEDICARE

## 2021-02-11 ENCOUNTER — CLINICAL SUPPORT (OUTPATIENT)
Dept: TRANSPLANT | Facility: CLINIC | Age: 66
End: 2021-02-11
Payer: MEDICARE

## 2021-02-11 VITALS — WEIGHT: 180 LBS | BODY MASS INDEX: 23.1 KG/M2 | SYSTOLIC BLOOD PRESSURE: 88 MMHG | TEMPERATURE: 98 F | HEIGHT: 74 IN

## 2021-02-11 DIAGNOSIS — I42.8 NICM (NONISCHEMIC CARDIOMYOPATHY): ICD-10-CM

## 2021-02-11 DIAGNOSIS — Z95.811 HEART REPLACED BY HEART ASSIST DEVICE: ICD-10-CM

## 2021-02-11 DIAGNOSIS — E11.9 TYPE 2 DIABETES MELLITUS WITHOUT COMPLICATION, WITHOUT LONG-TERM CURRENT USE OF INSULIN: ICD-10-CM

## 2021-02-11 DIAGNOSIS — Z95.811 LVAD (LEFT VENTRICULAR ASSIST DEVICE) PRESENT: Primary | ICD-10-CM

## 2021-02-11 DIAGNOSIS — I50.42 CHRONIC COMBINED SYSTOLIC AND DIASTOLIC CONGESTIVE HEART FAILURE: ICD-10-CM

## 2021-02-11 DIAGNOSIS — Z95.810 AICD (AUTOMATIC CARDIOVERTER/DEFIBRILLATOR) PRESENT: ICD-10-CM

## 2021-02-11 DIAGNOSIS — I10 ESSENTIAL HYPERTENSION: ICD-10-CM

## 2021-02-11 DIAGNOSIS — Z95.811 LVAD (LEFT VENTRICULAR ASSIST DEVICE) PRESENT: ICD-10-CM

## 2021-02-11 PROCEDURE — 93793 ANTICOAG MGMT PT WARFARIN: CPT | Mod: S$GLB,,,

## 2021-02-11 PROCEDURE — 93750 PR INTERROGATE VENT ASSIST DEV, IN PERSON, W PHYSICIAN ANALYSIS: ICD-10-PCS | Mod: S$GLB,,, | Performed by: INTERNAL MEDICINE

## 2021-02-11 PROCEDURE — 93793 PR ANTICOAGULANT MGMT FOR PT TAKING WARFARIN: ICD-10-PCS | Mod: S$GLB,,,

## 2021-02-11 PROCEDURE — 71046 X-RAY EXAM CHEST 2 VIEWS: CPT | Mod: TC,FY

## 2021-02-11 PROCEDURE — 99999 PR PBB SHADOW E&M-EST. PATIENT-LVL II: CPT | Mod: PBBFAC,,, | Performed by: PHYSICIAN ASSISTANT

## 2021-02-11 PROCEDURE — 99214 OFFICE O/P EST MOD 30 MIN: CPT | Mod: S$GLB,,, | Performed by: INTERNAL MEDICINE

## 2021-02-11 PROCEDURE — 99499 NO LOS: ICD-10-PCS | Mod: S$GLB,,, | Performed by: PHYSICIAN ASSISTANT

## 2021-02-11 PROCEDURE — 99499 UNLISTED E&M SERVICE: CPT | Mod: S$GLB,,, | Performed by: PHYSICIAN ASSISTANT

## 2021-02-11 PROCEDURE — 1157F PR ADVANCE CARE PLAN OR EQUIV PRESENT IN MEDICAL RECORD: ICD-10-PCS | Mod: S$GLB,,, | Performed by: PHYSICIAN ASSISTANT

## 2021-02-11 PROCEDURE — 3078F PR MOST RECENT DIASTOLIC BLOOD PRESSURE < 80 MM HG: ICD-10-PCS | Mod: CPTII,S$GLB,, | Performed by: INTERNAL MEDICINE

## 2021-02-11 PROCEDURE — 99999 PR PBB SHADOW E&M-EST. PATIENT-LVL III: ICD-10-PCS | Mod: PBBFAC,,, | Performed by: INTERNAL MEDICINE

## 2021-02-11 PROCEDURE — 1157F ADVNC CARE PLAN IN RCRD: CPT | Mod: S$GLB,,, | Performed by: PHYSICIAN ASSISTANT

## 2021-02-11 PROCEDURE — 3008F BODY MASS INDEX DOCD: CPT | Mod: CPTII,S$GLB,, | Performed by: INTERNAL MEDICINE

## 2021-02-11 PROCEDURE — 3074F PR MOST RECENT SYSTOLIC BLOOD PRESSURE < 130 MM HG: ICD-10-PCS | Mod: CPTII,S$GLB,, | Performed by: INTERNAL MEDICINE

## 2021-02-11 PROCEDURE — 3008F PR BODY MASS INDEX (BMI) DOCUMENTED: ICD-10-PCS | Mod: CPTII,S$GLB,, | Performed by: INTERNAL MEDICINE

## 2021-02-11 PROCEDURE — 1157F ADVNC CARE PLAN IN RCRD: CPT | Mod: S$GLB,,, | Performed by: INTERNAL MEDICINE

## 2021-02-11 PROCEDURE — 1126F AMNT PAIN NOTED NONE PRSNT: CPT | Mod: S$GLB,,, | Performed by: INTERNAL MEDICINE

## 2021-02-11 PROCEDURE — 3044F HG A1C LEVEL LT 7.0%: CPT | Mod: CPTII,S$GLB,, | Performed by: INTERNAL MEDICINE

## 2021-02-11 PROCEDURE — 1101F PR PT FALLS ASSESS DOC 0-1 FALLS W/OUT INJ PAST YR: ICD-10-PCS | Mod: CPTII,S$GLB,, | Performed by: INTERNAL MEDICINE

## 2021-02-11 PROCEDURE — 1100F PR PT FALLS ASSESS DOC 2+ FALLS/FALL W/INJURY/YR: ICD-10-PCS | Mod: CPTII,S$GLB,, | Performed by: PHYSICIAN ASSISTANT

## 2021-02-11 PROCEDURE — 71046 XR CHEST PA AND LATERAL: ICD-10-PCS | Mod: 26,,, | Performed by: RADIOLOGY

## 2021-02-11 PROCEDURE — 1126F PR PAIN SEVERITY QUANTIFIED, NO PAIN PRESENT: ICD-10-PCS | Mod: S$GLB,,, | Performed by: INTERNAL MEDICINE

## 2021-02-11 PROCEDURE — 99999 PR PBB SHADOW E&M-EST. PATIENT-LVL II: ICD-10-PCS | Mod: PBBFAC,,, | Performed by: PHYSICIAN ASSISTANT

## 2021-02-11 PROCEDURE — 3288F FALL RISK ASSESSMENT DOCD: CPT | Mod: CPTII,S$GLB,, | Performed by: INTERNAL MEDICINE

## 2021-02-11 PROCEDURE — 3288F FALL RISK ASSESSMENT DOCD: CPT | Mod: CPTII,S$GLB,, | Performed by: PHYSICIAN ASSISTANT

## 2021-02-11 PROCEDURE — 3288F PR FALLS RISK ASSESSMENT DOCUMENTED: ICD-10-PCS | Mod: CPTII,S$GLB,, | Performed by: INTERNAL MEDICINE

## 2021-02-11 PROCEDURE — 1100F PTFALLS ASSESS-DOCD GE2>/YR: CPT | Mod: CPTII,S$GLB,, | Performed by: PHYSICIAN ASSISTANT

## 2021-02-11 PROCEDURE — 99214 PR OFFICE/OUTPT VISIT, EST, LEVL IV, 30-39 MIN: ICD-10-PCS | Mod: S$GLB,,, | Performed by: INTERNAL MEDICINE

## 2021-02-11 PROCEDURE — 3074F SYST BP LT 130 MM HG: CPT | Mod: CPTII,S$GLB,, | Performed by: INTERNAL MEDICINE

## 2021-02-11 PROCEDURE — 93750 INTERROGATION VAD IN PERSON: CPT | Mod: S$GLB,,, | Performed by: INTERNAL MEDICINE

## 2021-02-11 PROCEDURE — 1157F PR ADVANCE CARE PLAN OR EQUIV PRESENT IN MEDICAL RECORD: ICD-10-PCS | Mod: S$GLB,,, | Performed by: INTERNAL MEDICINE

## 2021-02-11 PROCEDURE — 1101F PT FALLS ASSESS-DOCD LE1/YR: CPT | Mod: CPTII,S$GLB,, | Performed by: INTERNAL MEDICINE

## 2021-02-11 PROCEDURE — 99999 PR PBB SHADOW E&M-EST. PATIENT-LVL III: CPT | Mod: PBBFAC,,, | Performed by: INTERNAL MEDICINE

## 2021-02-11 PROCEDURE — 71046 X-RAY EXAM CHEST 2 VIEWS: CPT | Mod: 26,,, | Performed by: RADIOLOGY

## 2021-02-11 PROCEDURE — 3044F PR MOST RECENT HEMOGLOBIN A1C LEVEL <7.0%: ICD-10-PCS | Mod: CPTII,S$GLB,, | Performed by: INTERNAL MEDICINE

## 2021-02-11 PROCEDURE — 3288F PR FALLS RISK ASSESSMENT DOCUMENTED: ICD-10-PCS | Mod: CPTII,S$GLB,, | Performed by: PHYSICIAN ASSISTANT

## 2021-02-11 PROCEDURE — 3078F DIAST BP <80 MM HG: CPT | Mod: CPTII,S$GLB,, | Performed by: INTERNAL MEDICINE

## 2021-02-11 RX ORDER — ASPIRIN 325 MG
325 TABLET ORAL DAILY
Qty: 90 TABLET | Refills: 3 | Status: CANCELLED | OUTPATIENT
Start: 2021-02-11

## 2021-02-11 RX ORDER — ATORVASTATIN CALCIUM 80 MG/1
80 TABLET, FILM COATED ORAL DAILY
Qty: 90 TABLET | Refills: 3 | Status: CANCELLED | OUTPATIENT
Start: 2021-02-11 | End: 2022-02-11

## 2021-02-11 RX ORDER — AMIODARONE HYDROCHLORIDE 200 MG/1
200 TABLET ORAL DAILY
Qty: 90 TABLET | Refills: 3 | Status: CANCELLED | OUTPATIENT
Start: 2021-02-11 | End: 2022-02-11

## 2021-02-11 RX ORDER — LANOLIN ALCOHOL/MO/W.PET/CERES
400 CREAM (GRAM) TOPICAL 3 TIMES DAILY
Qty: 90 TABLET | Refills: 1 | Status: CANCELLED | OUTPATIENT
Start: 2021-02-11

## 2021-02-11 RX ORDER — WARFARIN SODIUM 5 MG/1
TABLET ORAL
Qty: 90 TABLET | Refills: 3 | Status: CANCELLED | OUTPATIENT
Start: 2021-02-11

## 2021-02-11 RX ORDER — FUROSEMIDE 80 MG/1
80 TABLET ORAL
Qty: 30 TABLET | Refills: 0
Start: 2021-02-12 | End: 2021-05-06 | Stop reason: SDUPTHER

## 2021-02-11 RX ORDER — DOCUSATE SODIUM 100 MG/1
100 CAPSULE, LIQUID FILLED ORAL DAILY
COMMUNITY

## 2021-02-15 ENCOUNTER — TELEPHONE (OUTPATIENT)
Dept: TRANSPLANT | Facility: CLINIC | Age: 66
End: 2021-02-15

## 2021-02-18 ENCOUNTER — LAB VISIT (OUTPATIENT)
Dept: LAB | Facility: HOSPITAL | Age: 66
End: 2021-02-18
Attending: INTERNAL MEDICINE
Payer: MEDICARE

## 2021-02-18 ENCOUNTER — OFFICE VISIT (OUTPATIENT)
Dept: TRANSPLANT | Facility: CLINIC | Age: 66
End: 2021-02-18
Payer: MEDICARE

## 2021-02-18 ENCOUNTER — ANTI-COAG VISIT (OUTPATIENT)
Dept: CARDIOLOGY | Facility: CLINIC | Age: 66
End: 2021-02-18
Payer: MEDICARE

## 2021-02-18 ENCOUNTER — CLINICAL SUPPORT (OUTPATIENT)
Dept: TRANSPLANT | Facility: CLINIC | Age: 66
End: 2021-02-18
Payer: MEDICARE

## 2021-02-18 VITALS
BODY MASS INDEX: 24.51 KG/M2 | SYSTOLIC BLOOD PRESSURE: 95 MMHG | HEART RATE: 101 BPM | HEIGHT: 74 IN | TEMPERATURE: 98 F | WEIGHT: 191 LBS

## 2021-02-18 DIAGNOSIS — Z95.811 LVAD (LEFT VENTRICULAR ASSIST DEVICE) PRESENT: Primary | ICD-10-CM

## 2021-02-18 DIAGNOSIS — Z76.82 ORGAN TRANSPLANT CANDIDATE: ICD-10-CM

## 2021-02-18 DIAGNOSIS — Z95.810 AICD (AUTOMATIC CARDIOVERTER/DEFIBRILLATOR) PRESENT: ICD-10-CM

## 2021-02-18 DIAGNOSIS — I25.10 CORONARY ARTERY DISEASE INVOLVING NATIVE CORONARY ARTERY OF NATIVE HEART WITHOUT ANGINA PECTORIS: ICD-10-CM

## 2021-02-18 DIAGNOSIS — I42.8 NICM (NONISCHEMIC CARDIOMYOPATHY): ICD-10-CM

## 2021-02-18 DIAGNOSIS — E11.9 TYPE 2 DIABETES MELLITUS WITHOUT COMPLICATION, WITHOUT LONG-TERM CURRENT USE OF INSULIN: ICD-10-CM

## 2021-02-18 DIAGNOSIS — E78.2 MIXED HYPERLIPIDEMIA: ICD-10-CM

## 2021-02-18 DIAGNOSIS — Z79.01 ANTICOAGULATED: ICD-10-CM

## 2021-02-18 DIAGNOSIS — Z95.811 HEART REPLACED BY HEART ASSIST DEVICE: ICD-10-CM

## 2021-02-18 DIAGNOSIS — Z76.82 HEART TRANSPLANT CANDIDATE: ICD-10-CM

## 2021-02-18 LAB
ALBUMIN SERPL BCP-MCNC: 3.3 G/DL (ref 3.5–5.2)
ALP SERPL-CCNC: 108 U/L (ref 55–135)
ALT SERPL W/O P-5'-P-CCNC: 5 U/L (ref 10–44)
ANION GAP SERPL CALC-SCNC: 8 MMOL/L (ref 8–16)
AST SERPL-CCNC: 14 U/L (ref 10–40)
BASOPHILS # BLD AUTO: 0.05 K/UL (ref 0–0.2)
BASOPHILS NFR BLD: 0.6 % (ref 0–1.9)
BILIRUB DIRECT SERPL-MCNC: 0.7 MG/DL (ref 0.1–0.3)
BILIRUB SERPL-MCNC: 1.5 MG/DL (ref 0.1–1)
BNP SERPL-MCNC: 371 PG/ML (ref 0–99)
BUN SERPL-MCNC: 14 MG/DL (ref 8–23)
CALCIUM SERPL-MCNC: 8.9 MG/DL (ref 8.7–10.5)
CHLORIDE SERPL-SCNC: 105 MMOL/L (ref 95–110)
CO2 SERPL-SCNC: 27 MMOL/L (ref 23–29)
CREAT SERPL-MCNC: 1.1 MG/DL (ref 0.5–1.4)
CRP SERPL-MCNC: 5.5 MG/L (ref 0–8.2)
DIFFERENTIAL METHOD: ABNORMAL
EOSINOPHIL # BLD AUTO: 0.2 K/UL (ref 0–0.5)
EOSINOPHIL NFR BLD: 2.5 % (ref 0–8)
ERYTHROCYTE [DISTWIDTH] IN BLOOD BY AUTOMATED COUNT: 15.5 % (ref 11.5–14.5)
EST. GFR  (AFRICAN AMERICAN): >60 ML/MIN/1.73 M^2
EST. GFR  (NON AFRICAN AMERICAN): >60 ML/MIN/1.73 M^2
GLUCOSE SERPL-MCNC: 123 MG/DL (ref 70–110)
HCT VFR BLD AUTO: 31.5 % (ref 40–54)
HGB BLD-MCNC: 9.8 G/DL (ref 14–18)
IMM GRANULOCYTES # BLD AUTO: 0.03 K/UL (ref 0–0.04)
IMM GRANULOCYTES NFR BLD AUTO: 0.3 % (ref 0–0.5)
INR PPP: 1.5
INR PPP: 1.5 (ref 0.8–1.2)
LDH SERPL L TO P-CCNC: 205 U/L (ref 110–260)
LYMPHOCYTES # BLD AUTO: 1.7 K/UL (ref 1–4.8)
LYMPHOCYTES NFR BLD: 19.3 % (ref 18–48)
MAGNESIUM SERPL-MCNC: 1.8 MG/DL (ref 1.6–2.6)
MCH RBC QN AUTO: 30.1 PG (ref 27–31)
MCHC RBC AUTO-ENTMCNC: 31.1 G/DL (ref 32–36)
MCV RBC AUTO: 97 FL (ref 82–98)
MONOCYTES # BLD AUTO: 0.6 K/UL (ref 0.3–1)
MONOCYTES NFR BLD: 7.2 % (ref 4–15)
NEUTROPHILS # BLD AUTO: 6.2 K/UL (ref 1.8–7.7)
NEUTROPHILS NFR BLD: 70.1 % (ref 38–73)
NRBC BLD-RTO: 0 /100 WBC
PHOSPHATE SERPL-MCNC: 3.5 MG/DL (ref 2.7–4.5)
PLATELET # BLD AUTO: 208 K/UL (ref 150–350)
PMV BLD AUTO: 9.7 FL (ref 9.2–12.9)
POTASSIUM SERPL-SCNC: 3.9 MMOL/L (ref 3.5–5.1)
PREALB SERPL-MCNC: 15 MG/DL (ref 20–43)
PROT SERPL-MCNC: 8.8 G/DL (ref 6–8.4)
PROTHROMBIN TIME: 16.1 SEC (ref 9–12.5)
RBC # BLD AUTO: 3.26 M/UL (ref 4.6–6.2)
SODIUM SERPL-SCNC: 140 MMOL/L (ref 136–145)
WBC # BLD AUTO: 8.81 K/UL (ref 3.9–12.7)

## 2021-02-18 PROCEDURE — 1157F ADVNC CARE PLAN IN RCRD: CPT | Mod: S$GLB,,, | Performed by: INTERNAL MEDICINE

## 2021-02-18 PROCEDURE — 3074F SYST BP LT 130 MM HG: CPT | Mod: CPTII,S$GLB,, | Performed by: INTERNAL MEDICINE

## 2021-02-18 PROCEDURE — 82248 BILIRUBIN DIRECT: CPT

## 2021-02-18 PROCEDURE — 3078F PR MOST RECENT DIASTOLIC BLOOD PRESSURE < 80 MM HG: ICD-10-PCS | Mod: CPTII,S$GLB,, | Performed by: INTERNAL MEDICINE

## 2021-02-18 PROCEDURE — 99214 PR OFFICE/OUTPT VISIT, EST, LEVL IV, 30-39 MIN: ICD-10-PCS | Mod: S$GLB,,, | Performed by: INTERNAL MEDICINE

## 2021-02-18 PROCEDURE — 3288F FALL RISK ASSESSMENT DOCD: CPT | Mod: CPTII,S$GLB,, | Performed by: INTERNAL MEDICINE

## 2021-02-18 PROCEDURE — 86140 C-REACTIVE PROTEIN: CPT

## 2021-02-18 PROCEDURE — 83880 ASSAY OF NATRIURETIC PEPTIDE: CPT

## 2021-02-18 PROCEDURE — 3008F PR BODY MASS INDEX (BMI) DOCUMENTED: ICD-10-PCS | Mod: CPTII,S$GLB,, | Performed by: INTERNAL MEDICINE

## 2021-02-18 PROCEDURE — 1101F PR PT FALLS ASSESS DOC 0-1 FALLS W/OUT INJ PAST YR: ICD-10-PCS | Mod: CPTII,S$GLB,, | Performed by: INTERNAL MEDICINE

## 2021-02-18 PROCEDURE — 84134 ASSAY OF PREALBUMIN: CPT

## 2021-02-18 PROCEDURE — 36415 COLL VENOUS BLD VENIPUNCTURE: CPT

## 2021-02-18 PROCEDURE — 3044F PR MOST RECENT HEMOGLOBIN A1C LEVEL <7.0%: ICD-10-PCS | Mod: CPTII,S$GLB,, | Performed by: INTERNAL MEDICINE

## 2021-02-18 PROCEDURE — 99214 OFFICE O/P EST MOD 30 MIN: CPT | Mod: S$GLB,,, | Performed by: INTERNAL MEDICINE

## 2021-02-18 PROCEDURE — 99999 PR PBB SHADOW E&M-EST. PATIENT-LVL IV: CPT | Mod: PBBFAC,,, | Performed by: INTERNAL MEDICINE

## 2021-02-18 PROCEDURE — 84100 ASSAY OF PHOSPHORUS: CPT

## 2021-02-18 PROCEDURE — 3288F PR FALLS RISK ASSESSMENT DOCUMENTED: ICD-10-PCS | Mod: CPTII,S$GLB,, | Performed by: INTERNAL MEDICINE

## 2021-02-18 PROCEDURE — 99999 PR PBB SHADOW E&M-EST. PATIENT-LVL IV: ICD-10-PCS | Mod: PBBFAC,,, | Performed by: INTERNAL MEDICINE

## 2021-02-18 PROCEDURE — 1101F PT FALLS ASSESS-DOCD LE1/YR: CPT | Mod: CPTII,S$GLB,, | Performed by: INTERNAL MEDICINE

## 2021-02-18 PROCEDURE — 80053 COMPREHEN METABOLIC PANEL: CPT

## 2021-02-18 PROCEDURE — 3078F DIAST BP <80 MM HG: CPT | Mod: CPTII,S$GLB,, | Performed by: INTERNAL MEDICINE

## 2021-02-18 PROCEDURE — 3008F BODY MASS INDEX DOCD: CPT | Mod: CPTII,S$GLB,, | Performed by: INTERNAL MEDICINE

## 2021-02-18 PROCEDURE — 85025 COMPLETE CBC W/AUTO DIFF WBC: CPT

## 2021-02-18 PROCEDURE — 83735 ASSAY OF MAGNESIUM: CPT

## 2021-02-18 PROCEDURE — 1157F PR ADVANCE CARE PLAN OR EQUIV PRESENT IN MEDICAL RECORD: ICD-10-PCS | Mod: S$GLB,,, | Performed by: INTERNAL MEDICINE

## 2021-02-18 PROCEDURE — 1126F AMNT PAIN NOTED NONE PRSNT: CPT | Mod: S$GLB,,, | Performed by: INTERNAL MEDICINE

## 2021-02-18 PROCEDURE — 83615 LACTATE (LD) (LDH) ENZYME: CPT

## 2021-02-18 PROCEDURE — 3074F PR MOST RECENT SYSTOLIC BLOOD PRESSURE < 130 MM HG: ICD-10-PCS | Mod: CPTII,S$GLB,, | Performed by: INTERNAL MEDICINE

## 2021-02-18 PROCEDURE — 85610 PROTHROMBIN TIME: CPT

## 2021-02-18 PROCEDURE — 3044F HG A1C LEVEL LT 7.0%: CPT | Mod: CPTII,S$GLB,, | Performed by: INTERNAL MEDICINE

## 2021-02-18 PROCEDURE — 1126F PR PAIN SEVERITY QUANTIFIED, NO PAIN PRESENT: ICD-10-PCS | Mod: S$GLB,,, | Performed by: INTERNAL MEDICINE

## 2021-02-18 RX ORDER — LOSARTAN POTASSIUM 25 MG/1
25 TABLET ORAL DAILY
Qty: 90 TABLET | Refills: 3 | Status: SHIPPED | OUTPATIENT
Start: 2021-02-18 | End: 2021-04-08

## 2021-02-23 ENCOUNTER — ANTI-COAG VISIT (OUTPATIENT)
Dept: CARDIOLOGY | Facility: CLINIC | Age: 66
End: 2021-02-23
Payer: MEDICARE

## 2021-02-23 DIAGNOSIS — Z95.811 LVAD (LEFT VENTRICULAR ASSIST DEVICE) PRESENT: Primary | ICD-10-CM

## 2021-02-23 LAB — INR PPP: 2.4

## 2021-02-23 PROCEDURE — 93793 PR ANTICOAGULANT MGMT FOR PT TAKING WARFARIN: ICD-10-PCS | Mod: S$GLB,,,

## 2021-02-23 PROCEDURE — 93793 ANTICOAG MGMT PT WARFARIN: CPT | Mod: S$GLB,,,

## 2021-02-24 ENCOUNTER — LAB VISIT (OUTPATIENT)
Dept: LAB | Facility: HOSPITAL | Age: 66
End: 2021-02-24
Attending: INTERNAL MEDICINE
Payer: MEDICARE

## 2021-02-24 ENCOUNTER — CLINICAL SUPPORT (OUTPATIENT)
Dept: TRANSPLANT | Facility: CLINIC | Age: 66
End: 2021-02-24
Payer: MEDICARE

## 2021-02-24 ENCOUNTER — OFFICE VISIT (OUTPATIENT)
Dept: TRANSPLANT | Facility: CLINIC | Age: 66
End: 2021-02-24
Payer: MEDICARE

## 2021-02-24 ENCOUNTER — ANTI-COAG VISIT (OUTPATIENT)
Dept: CARDIOLOGY | Facility: CLINIC | Age: 66
End: 2021-02-24
Payer: MEDICARE

## 2021-02-24 VITALS
SYSTOLIC BLOOD PRESSURE: 88 MMHG | HEART RATE: 95 BPM | HEIGHT: 74 IN | TEMPERATURE: 98 F | BODY MASS INDEX: 24.51 KG/M2 | WEIGHT: 191 LBS

## 2021-02-24 DIAGNOSIS — I10 ESSENTIAL HYPERTENSION: ICD-10-CM

## 2021-02-24 DIAGNOSIS — E78.2 MIXED HYPERLIPIDEMIA: ICD-10-CM

## 2021-02-24 DIAGNOSIS — Z95.811 LVAD (LEFT VENTRICULAR ASSIST DEVICE) PRESENT: Primary | ICD-10-CM

## 2021-02-24 DIAGNOSIS — I50.42 CHRONIC COMBINED SYSTOLIC AND DIASTOLIC CONGESTIVE HEART FAILURE: Primary | ICD-10-CM

## 2021-02-24 DIAGNOSIS — Z95.811 HEART REPLACED BY HEART ASSIST DEVICE: ICD-10-CM

## 2021-02-24 DIAGNOSIS — I42.8 NICM (NONISCHEMIC CARDIOMYOPATHY): ICD-10-CM

## 2021-02-24 LAB
ALBUMIN SERPL BCP-MCNC: 3.1 G/DL (ref 3.5–5.2)
ALP SERPL-CCNC: 107 U/L (ref 55–135)
ALT SERPL W/O P-5'-P-CCNC: <5 U/L (ref 10–44)
ANION GAP SERPL CALC-SCNC: 7 MMOL/L (ref 8–16)
AST SERPL-CCNC: 12 U/L (ref 10–40)
BASOPHILS # BLD AUTO: 0.04 K/UL (ref 0–0.2)
BASOPHILS NFR BLD: 0.5 % (ref 0–1.9)
BILIRUB DIRECT SERPL-MCNC: 0.7 MG/DL (ref 0.1–0.3)
BILIRUB SERPL-MCNC: 1.3 MG/DL (ref 0.1–1)
BNP SERPL-MCNC: 318 PG/ML (ref 0–99)
BUN SERPL-MCNC: 16 MG/DL (ref 8–23)
CALCIUM SERPL-MCNC: 8.9 MG/DL (ref 8.7–10.5)
CHLORIDE SERPL-SCNC: 102 MMOL/L (ref 95–110)
CO2 SERPL-SCNC: 28 MMOL/L (ref 23–29)
CREAT SERPL-MCNC: 1.1 MG/DL (ref 0.5–1.4)
CRP SERPL-MCNC: 7.2 MG/L (ref 0–8.2)
DIFFERENTIAL METHOD: ABNORMAL
EOSINOPHIL # BLD AUTO: 0.3 K/UL (ref 0–0.5)
EOSINOPHIL NFR BLD: 3 % (ref 0–8)
ERYTHROCYTE [DISTWIDTH] IN BLOOD BY AUTOMATED COUNT: 14.8 % (ref 11.5–14.5)
EST. GFR  (AFRICAN AMERICAN): >60 ML/MIN/1.73 M^2
EST. GFR  (NON AFRICAN AMERICAN): >60 ML/MIN/1.73 M^2
GLUCOSE SERPL-MCNC: 115 MG/DL (ref 70–110)
HCT VFR BLD AUTO: 29.3 % (ref 40–54)
HGB BLD-MCNC: 9.1 G/DL (ref 14–18)
IMM GRANULOCYTES # BLD AUTO: 0.03 K/UL (ref 0–0.04)
IMM GRANULOCYTES NFR BLD AUTO: 0.4 % (ref 0–0.5)
INR PPP: 1.8 (ref 0.8–1.2)
LDH SERPL L TO P-CCNC: 153 U/L (ref 110–260)
LYMPHOCYTES # BLD AUTO: 1.8 K/UL (ref 1–4.8)
LYMPHOCYTES NFR BLD: 21.7 % (ref 18–48)
MAGNESIUM SERPL-MCNC: 1.7 MG/DL (ref 1.6–2.6)
MCH RBC QN AUTO: 29.6 PG (ref 27–31)
MCHC RBC AUTO-ENTMCNC: 31.1 G/DL (ref 32–36)
MCV RBC AUTO: 95 FL (ref 82–98)
MONOCYTES # BLD AUTO: 0.8 K/UL (ref 0.3–1)
MONOCYTES NFR BLD: 9.1 % (ref 4–15)
NEUTROPHILS # BLD AUTO: 5.4 K/UL (ref 1.8–7.7)
NEUTROPHILS NFR BLD: 65.3 % (ref 38–73)
NRBC BLD-RTO: 0 /100 WBC
PHOSPHATE SERPL-MCNC: 3.6 MG/DL (ref 2.7–4.5)
PLATELET # BLD AUTO: 190 K/UL (ref 150–350)
PMV BLD AUTO: 9.6 FL (ref 9.2–12.9)
POTASSIUM SERPL-SCNC: 3.6 MMOL/L (ref 3.5–5.1)
PREALB SERPL-MCNC: 14 MG/DL (ref 20–43)
PROT SERPL-MCNC: 7.7 G/DL (ref 6–8.4)
PROTHROMBIN TIME: 18.8 SEC (ref 9–12.5)
RBC # BLD AUTO: 3.07 M/UL (ref 4.6–6.2)
SODIUM SERPL-SCNC: 137 MMOL/L (ref 136–145)
WBC # BLD AUTO: 8.22 K/UL (ref 3.9–12.7)

## 2021-02-24 PROCEDURE — 36415 COLL VENOUS BLD VENIPUNCTURE: CPT

## 2021-02-24 PROCEDURE — 99999 PR PBB SHADOW E&M-EST. PATIENT-LVL IV: ICD-10-PCS | Mod: PBBFAC,,, | Performed by: INTERNAL MEDICINE

## 2021-02-24 PROCEDURE — 83615 LACTATE (LD) (LDH) ENZYME: CPT

## 2021-02-24 PROCEDURE — 99214 OFFICE O/P EST MOD 30 MIN: CPT | Mod: S$GLB,,, | Performed by: INTERNAL MEDICINE

## 2021-02-24 PROCEDURE — 3008F PR BODY MASS INDEX (BMI) DOCUMENTED: ICD-10-PCS | Mod: CPTII,S$GLB,, | Performed by: INTERNAL MEDICINE

## 2021-02-24 PROCEDURE — 1101F PT FALLS ASSESS-DOCD LE1/YR: CPT | Mod: CPTII,S$GLB,, | Performed by: INTERNAL MEDICINE

## 2021-02-24 PROCEDURE — 3008F BODY MASS INDEX DOCD: CPT | Mod: CPTII,S$GLB,, | Performed by: INTERNAL MEDICINE

## 2021-02-24 PROCEDURE — 3074F SYST BP LT 130 MM HG: CPT | Mod: CPTII,S$GLB,, | Performed by: INTERNAL MEDICINE

## 2021-02-24 PROCEDURE — 1101F PR PT FALLS ASSESS DOC 0-1 FALLS W/OUT INJ PAST YR: ICD-10-PCS | Mod: CPTII,S$GLB,, | Performed by: INTERNAL MEDICINE

## 2021-02-24 PROCEDURE — 3078F PR MOST RECENT DIASTOLIC BLOOD PRESSURE < 80 MM HG: ICD-10-PCS | Mod: CPTII,S$GLB,, | Performed by: INTERNAL MEDICINE

## 2021-02-24 PROCEDURE — 1157F ADVNC CARE PLAN IN RCRD: CPT | Mod: S$GLB,,, | Performed by: INTERNAL MEDICINE

## 2021-02-24 PROCEDURE — 1126F AMNT PAIN NOTED NONE PRSNT: CPT | Mod: S$GLB,,, | Performed by: INTERNAL MEDICINE

## 2021-02-24 PROCEDURE — 85610 PROTHROMBIN TIME: CPT

## 2021-02-24 PROCEDURE — 82248 BILIRUBIN DIRECT: CPT

## 2021-02-24 PROCEDURE — 3074F PR MOST RECENT SYSTOLIC BLOOD PRESSURE < 130 MM HG: ICD-10-PCS | Mod: CPTII,S$GLB,, | Performed by: INTERNAL MEDICINE

## 2021-02-24 PROCEDURE — 1157F PR ADVANCE CARE PLAN OR EQUIV PRESENT IN MEDICAL RECORD: ICD-10-PCS | Mod: S$GLB,,, | Performed by: INTERNAL MEDICINE

## 2021-02-24 PROCEDURE — 84134 ASSAY OF PREALBUMIN: CPT

## 2021-02-24 PROCEDURE — 3288F PR FALLS RISK ASSESSMENT DOCUMENTED: ICD-10-PCS | Mod: CPTII,S$GLB,, | Performed by: INTERNAL MEDICINE

## 2021-02-24 PROCEDURE — 3288F FALL RISK ASSESSMENT DOCD: CPT | Mod: CPTII,S$GLB,, | Performed by: INTERNAL MEDICINE

## 2021-02-24 PROCEDURE — 3078F DIAST BP <80 MM HG: CPT | Mod: CPTII,S$GLB,, | Performed by: INTERNAL MEDICINE

## 2021-02-24 PROCEDURE — 85025 COMPLETE CBC W/AUTO DIFF WBC: CPT

## 2021-02-24 PROCEDURE — 83735 ASSAY OF MAGNESIUM: CPT

## 2021-02-24 PROCEDURE — 1126F PR PAIN SEVERITY QUANTIFIED, NO PAIN PRESENT: ICD-10-PCS | Mod: S$GLB,,, | Performed by: INTERNAL MEDICINE

## 2021-02-24 PROCEDURE — 86140 C-REACTIVE PROTEIN: CPT

## 2021-02-24 PROCEDURE — 83880 ASSAY OF NATRIURETIC PEPTIDE: CPT

## 2021-02-24 PROCEDURE — 99214 PR OFFICE/OUTPT VISIT, EST, LEVL IV, 30-39 MIN: ICD-10-PCS | Mod: S$GLB,,, | Performed by: INTERNAL MEDICINE

## 2021-02-24 PROCEDURE — 99999 PR PBB SHADOW E&M-EST. PATIENT-LVL IV: CPT | Mod: PBBFAC,,, | Performed by: INTERNAL MEDICINE

## 2021-02-24 PROCEDURE — 80053 COMPREHEN METABOLIC PANEL: CPT

## 2021-02-24 PROCEDURE — 84100 ASSAY OF PHOSPHORUS: CPT

## 2021-03-01 ENCOUNTER — ANTI-COAG VISIT (OUTPATIENT)
Dept: CARDIOLOGY | Facility: CLINIC | Age: 66
End: 2021-03-01
Payer: MEDICARE

## 2021-03-01 DIAGNOSIS — Z95.811 LVAD (LEFT VENTRICULAR ASSIST DEVICE) PRESENT: Primary | ICD-10-CM

## 2021-03-01 LAB — INR PPP: 3

## 2021-03-01 PROCEDURE — 93793 ANTICOAG MGMT PT WARFARIN: CPT | Mod: S$GLB,,,

## 2021-03-01 PROCEDURE — 93793 PR ANTICOAGULANT MGMT FOR PT TAKING WARFARIN: ICD-10-PCS | Mod: S$GLB,,,

## 2021-03-03 ENCOUNTER — ANTI-COAG VISIT (OUTPATIENT)
Dept: CARDIOLOGY | Facility: CLINIC | Age: 66
End: 2021-03-03
Payer: MEDICARE

## 2021-03-03 DIAGNOSIS — Z95.811 LVAD (LEFT VENTRICULAR ASSIST DEVICE) PRESENT: Primary | ICD-10-CM

## 2021-03-03 LAB — INR PPP: 3

## 2021-03-03 PROCEDURE — 93793 PR ANTICOAGULANT MGMT FOR PT TAKING WARFARIN: ICD-10-PCS | Mod: S$GLB,,,

## 2021-03-03 PROCEDURE — 93793 ANTICOAG MGMT PT WARFARIN: CPT | Mod: S$GLB,,,

## 2021-03-08 ENCOUNTER — CLINICAL SUPPORT (OUTPATIENT)
Dept: TRANSPLANT | Facility: CLINIC | Age: 66
End: 2021-03-08
Payer: MEDICARE

## 2021-03-08 ENCOUNTER — ANTI-COAG VISIT (OUTPATIENT)
Dept: CARDIOLOGY | Facility: CLINIC | Age: 66
End: 2021-03-08
Payer: MEDICARE

## 2021-03-08 ENCOUNTER — OFFICE VISIT (OUTPATIENT)
Dept: TRANSPLANT | Facility: CLINIC | Age: 66
End: 2021-03-08
Payer: MEDICARE

## 2021-03-08 ENCOUNTER — HOSPITAL ENCOUNTER (OUTPATIENT)
Dept: CARDIOLOGY | Facility: HOSPITAL | Age: 66
Discharge: HOME | End: 2021-03-08
Attending: INTERNAL MEDICINE
Payer: MEDICARE

## 2021-03-08 VITALS — HEART RATE: 100 BPM | WEIGHT: 191 LBS | HEIGHT: 74 IN | BODY MASS INDEX: 24.51 KG/M2

## 2021-03-08 VITALS
HEART RATE: 103 BPM | SYSTOLIC BLOOD PRESSURE: 90 MMHG | BODY MASS INDEX: 24.51 KG/M2 | WEIGHT: 191 LBS | TEMPERATURE: 98 F | HEIGHT: 74 IN

## 2021-03-08 DIAGNOSIS — I50.42 CHRONIC COMBINED SYSTOLIC AND DIASTOLIC CONGESTIVE HEART FAILURE: ICD-10-CM

## 2021-03-08 DIAGNOSIS — Z95.811 LVAD (LEFT VENTRICULAR ASSIST DEVICE) PRESENT: Primary | ICD-10-CM

## 2021-03-08 DIAGNOSIS — I10 ESSENTIAL HYPERTENSION: ICD-10-CM

## 2021-03-08 DIAGNOSIS — Z95.811 HEART REPLACED BY HEART ASSIST DEVICE: Primary | ICD-10-CM

## 2021-03-08 DIAGNOSIS — I42.8 NICM (NONISCHEMIC CARDIOMYOPATHY): ICD-10-CM

## 2021-03-08 LAB
ASCENDING AORTA: 3.86 CM
BSA FOR ECHO PROCEDURE: 2.13 M2
CV ECHO LV RWT: 0.25 CM
DOP CALC LVOT AREA: 4.9 CM2
DOP CALC LVOT DIAMETER: 2.51 CM
DOP CALC LVOT PEAK VEL: 0.45 M/S
DOP CALC LVOT STROKE VOLUME: 38.92 CM3
DOP CALCLVOT PEAK VEL VTI: 7.87 CM
E WAVE DECELERATION TIME: 159.56 MSEC
E/A RATIO: 1.64
E/E' RATIO: 10.82 M/S
ECHO LV POSTERIOR WALL: 0.84 CM (ref 0.6–1.1)
FRACTIONAL SHORTENING: 18 % (ref 28–44)
INTERVENTRICULAR SEPTUM: 0.8 CM (ref 0.6–1.1)
LA MAJOR: 4.62 CM
LA MINOR: 4.47 CM
LA WIDTH: 4.75 CM
LEFT ATRIUM SIZE: 4.2 CM
LEFT ATRIUM VOLUME INDEX MOD: 34.4 ML/M2
LEFT ATRIUM VOLUME INDEX: 36.2 ML/M2
LEFT ATRIUM VOLUME MOD: 73.31 CM3
LEFT ATRIUM VOLUME: 77.05 CM3
LEFT INTERNAL DIMENSION IN SYSTOLE: 5.49 CM (ref 2.1–4)
LEFT VENTRICLE DIASTOLIC VOLUME INDEX: 108.39 ML/M2
LEFT VENTRICLE DIASTOLIC VOLUME: 230.87 ML
LEFT VENTRICLE MASS INDEX: 109 G/M2
LEFT VENTRICLE SYSTOLIC VOLUME INDEX: 68.8 ML/M2
LEFT VENTRICLE SYSTOLIC VOLUME: 146.6 ML
LEFT VENTRICULAR INTERNAL DIMENSION IN DIASTOLE: 6.69 CM (ref 3.5–6)
LEFT VENTRICULAR MASS: 232.39 G
LV LATERAL E/E' RATIO: 7.08 M/S
LV SEPTAL E/E' RATIO: 23 M/S
MV PEAK A VEL: 0.56 M/S
MV PEAK E VEL: 0.92 M/S
MV STENOSIS PRESSURE HALF TIME: 46.27 MS
MV VALVE AREA P 1/2 METHOD: 4.75 CM2
PISA TR MAX VEL: 2.78 M/S
RA MAJOR: 4.76 CM
RA PRESSURE: 3 MMHG
RA WIDTH: 4.94 CM
RIGHT VENTRICULAR END-DIASTOLIC DIMENSION: 4.6 CM
RV TISSUE DOPPLER FREE WALL SYSTOLIC VELOCITY 1 (APICAL 4 CHAMBER VIEW): 6.84 CM/S
SINUS: 3.72 CM
STJ: 3.27 CM
TDI LATERAL: 0.13 M/S
TDI SEPTAL: 0.04 M/S
TDI: 0.09 M/S
TR MAX PG: 31 MMHG
TRICUSPID ANNULAR PLANE SYSTOLIC EXCURSION: 1.26 CM
TV REST PULMONARY ARTERY PRESSURE: 34 MMHG

## 2021-03-08 PROCEDURE — 93306 ECHO (CUPID ONLY): ICD-10-PCS | Mod: 26,,, | Performed by: INTERNAL MEDICINE

## 2021-03-08 PROCEDURE — 1101F PR PT FALLS ASSESS DOC 0-1 FALLS W/OUT INJ PAST YR: ICD-10-PCS | Mod: CPTII,S$GLB,, | Performed by: INTERNAL MEDICINE

## 2021-03-08 PROCEDURE — 3288F PR FALLS RISK ASSESSMENT DOCUMENTED: ICD-10-PCS | Mod: CPTII,S$GLB,, | Performed by: INTERNAL MEDICINE

## 2021-03-08 PROCEDURE — 1126F AMNT PAIN NOTED NONE PRSNT: CPT | Mod: S$GLB,,, | Performed by: INTERNAL MEDICINE

## 2021-03-08 PROCEDURE — 1126F PR PAIN SEVERITY QUANTIFIED, NO PAIN PRESENT: ICD-10-PCS | Mod: S$GLB,,, | Performed by: INTERNAL MEDICINE

## 2021-03-08 PROCEDURE — 93750 INTERROGATION VAD IN PERSON: CPT | Mod: S$GLB,,, | Performed by: INTERNAL MEDICINE

## 2021-03-08 PROCEDURE — 99999 PR PBB SHADOW E&M-EST. PATIENT-LVL IV: ICD-10-PCS | Mod: PBBFAC,,, | Performed by: INTERNAL MEDICINE

## 2021-03-08 PROCEDURE — 99214 OFFICE O/P EST MOD 30 MIN: CPT | Mod: S$GLB,,, | Performed by: INTERNAL MEDICINE

## 2021-03-08 PROCEDURE — 1157F PR ADVANCE CARE PLAN OR EQUIV PRESENT IN MEDICAL RECORD: ICD-10-PCS | Mod: S$GLB,,, | Performed by: INTERNAL MEDICINE

## 2021-03-08 PROCEDURE — 3074F SYST BP LT 130 MM HG: CPT | Mod: CPTII,S$GLB,, | Performed by: INTERNAL MEDICINE

## 2021-03-08 PROCEDURE — 1101F PT FALLS ASSESS-DOCD LE1/YR: CPT | Mod: CPTII,S$GLB,, | Performed by: INTERNAL MEDICINE

## 2021-03-08 PROCEDURE — 3074F PR MOST RECENT SYSTOLIC BLOOD PRESSURE < 130 MM HG: ICD-10-PCS | Mod: CPTII,S$GLB,, | Performed by: INTERNAL MEDICINE

## 2021-03-08 PROCEDURE — 93306 TTE W/DOPPLER COMPLETE: CPT

## 2021-03-08 PROCEDURE — 93750 OP LVAD INTERROGATION: ICD-10-PCS | Mod: S$GLB,,, | Performed by: INTERNAL MEDICINE

## 2021-03-08 PROCEDURE — 99214 PR OFFICE/OUTPT VISIT, EST, LEVL IV, 30-39 MIN: ICD-10-PCS | Mod: S$GLB,,, | Performed by: INTERNAL MEDICINE

## 2021-03-08 PROCEDURE — 3008F PR BODY MASS INDEX (BMI) DOCUMENTED: ICD-10-PCS | Mod: CPTII,S$GLB,, | Performed by: INTERNAL MEDICINE

## 2021-03-08 PROCEDURE — 93306 TTE W/DOPPLER COMPLETE: CPT | Mod: 26,,, | Performed by: INTERNAL MEDICINE

## 2021-03-08 PROCEDURE — 99999 PR PBB SHADOW E&M-EST. PATIENT-LVL IV: CPT | Mod: PBBFAC,,, | Performed by: INTERNAL MEDICINE

## 2021-03-08 PROCEDURE — 99999 PR PBB SHADOW E&M-EST. PATIENT-LVL I: CPT | Mod: PBBFAC,,,

## 2021-03-08 PROCEDURE — 3008F BODY MASS INDEX DOCD: CPT | Mod: CPTII,S$GLB,, | Performed by: INTERNAL MEDICINE

## 2021-03-08 PROCEDURE — 1157F ADVNC CARE PLAN IN RCRD: CPT | Mod: S$GLB,,, | Performed by: INTERNAL MEDICINE

## 2021-03-08 PROCEDURE — 3288F FALL RISK ASSESSMENT DOCD: CPT | Mod: CPTII,S$GLB,, | Performed by: INTERNAL MEDICINE

## 2021-03-08 PROCEDURE — 3078F DIAST BP <80 MM HG: CPT | Mod: CPTII,S$GLB,, | Performed by: INTERNAL MEDICINE

## 2021-03-08 PROCEDURE — 99999 PR PBB SHADOW E&M-EST. PATIENT-LVL I: ICD-10-PCS | Mod: PBBFAC,,,

## 2021-03-08 PROCEDURE — 3078F PR MOST RECENT DIASTOLIC BLOOD PRESSURE < 80 MM HG: ICD-10-PCS | Mod: CPTII,S$GLB,, | Performed by: INTERNAL MEDICINE

## 2021-03-09 ENCOUNTER — TELEPHONE (OUTPATIENT)
Dept: TRANSPLANT | Facility: CLINIC | Age: 66
End: 2021-03-09

## 2021-03-11 ENCOUNTER — ANTI-COAG VISIT (OUTPATIENT)
Dept: CARDIOLOGY | Facility: CLINIC | Age: 66
End: 2021-03-11
Payer: MEDICARE

## 2021-03-11 DIAGNOSIS — Z95.811 LVAD (LEFT VENTRICULAR ASSIST DEVICE) PRESENT: Primary | ICD-10-CM

## 2021-03-11 LAB — INR PPP: 3.3

## 2021-03-11 PROCEDURE — 93793 PR ANTICOAGULANT MGMT FOR PT TAKING WARFARIN: ICD-10-PCS | Mod: S$GLB,,,

## 2021-03-11 PROCEDURE — 93793 ANTICOAG MGMT PT WARFARIN: CPT | Mod: S$GLB,,,

## 2021-03-15 ENCOUNTER — ANTI-COAG VISIT (OUTPATIENT)
Dept: CARDIOLOGY | Facility: CLINIC | Age: 66
End: 2021-03-15
Payer: MEDICARE

## 2021-03-15 DIAGNOSIS — Z95.811 LVAD (LEFT VENTRICULAR ASSIST DEVICE) PRESENT: Primary | ICD-10-CM

## 2021-03-15 LAB — INR PPP: 2.2

## 2021-03-15 PROCEDURE — 93793 PR ANTICOAGULANT MGMT FOR PT TAKING WARFARIN: ICD-10-PCS | Mod: S$GLB,,,

## 2021-03-15 PROCEDURE — 93793 ANTICOAG MGMT PT WARFARIN: CPT | Mod: S$GLB,,,

## 2021-03-19 ENCOUNTER — PATIENT MESSAGE (OUTPATIENT)
Dept: CARDIOTHORACIC SURGERY | Facility: CLINIC | Age: 66
End: 2021-03-19

## 2021-03-19 DIAGNOSIS — Z95.811 LVAD (LEFT VENTRICULAR ASSIST DEVICE) PRESENT: Primary | ICD-10-CM

## 2021-03-22 ENCOUNTER — ANTI-COAG VISIT (OUTPATIENT)
Dept: CARDIOLOGY | Facility: CLINIC | Age: 66
End: 2021-03-22
Payer: MEDICARE

## 2021-03-22 DIAGNOSIS — Z95.811 LVAD (LEFT VENTRICULAR ASSIST DEVICE) PRESENT: Primary | ICD-10-CM

## 2021-03-22 LAB — INR PPP: 2.5

## 2021-03-22 PROCEDURE — 93793 PR ANTICOAGULANT MGMT FOR PT TAKING WARFARIN: ICD-10-PCS | Mod: S$GLB,,,

## 2021-03-22 PROCEDURE — 93793 ANTICOAG MGMT PT WARFARIN: CPT | Mod: S$GLB,,,

## 2021-03-29 ENCOUNTER — ANTI-COAG VISIT (OUTPATIENT)
Dept: CARDIOLOGY | Facility: CLINIC | Age: 66
End: 2021-03-29
Payer: MEDICARE

## 2021-03-29 DIAGNOSIS — Z95.811 LVAD (LEFT VENTRICULAR ASSIST DEVICE) PRESENT: Primary | ICD-10-CM

## 2021-03-29 LAB — INR PPP: 3.6

## 2021-03-29 PROCEDURE — 93793 ANTICOAG MGMT PT WARFARIN: CPT | Mod: S$GLB,,,

## 2021-03-29 PROCEDURE — 93793 PR ANTICOAGULANT MGMT FOR PT TAKING WARFARIN: ICD-10-PCS | Mod: S$GLB,,,

## 2021-03-31 ENCOUNTER — ANTI-COAG VISIT (OUTPATIENT)
Dept: CARDIOLOGY | Facility: CLINIC | Age: 66
End: 2021-03-31
Payer: MEDICARE

## 2021-03-31 DIAGNOSIS — Z95.811 LVAD (LEFT VENTRICULAR ASSIST DEVICE) PRESENT: Primary | ICD-10-CM

## 2021-03-31 LAB — INR PPP: 4.9

## 2021-03-31 PROCEDURE — 93793 ANTICOAG MGMT PT WARFARIN: CPT | Mod: S$GLB,,,

## 2021-03-31 PROCEDURE — 93793 PR ANTICOAGULANT MGMT FOR PT TAKING WARFARIN: ICD-10-PCS | Mod: S$GLB,,,

## 2021-04-01 ENCOUNTER — ANTI-COAG VISIT (OUTPATIENT)
Dept: CARDIOLOGY | Facility: CLINIC | Age: 66
End: 2021-04-01
Payer: MEDICARE

## 2021-04-01 DIAGNOSIS — Z95.811 LVAD (LEFT VENTRICULAR ASSIST DEVICE) PRESENT: Primary | ICD-10-CM

## 2021-04-01 LAB — INR PPP: 2.3

## 2021-04-01 PROCEDURE — 93793 ANTICOAG MGMT PT WARFARIN: CPT | Mod: S$GLB,,,

## 2021-04-01 PROCEDURE — 93793 PR ANTICOAGULANT MGMT FOR PT TAKING WARFARIN: ICD-10-PCS | Mod: S$GLB,,,

## 2021-04-05 ENCOUNTER — ANTI-COAG VISIT (OUTPATIENT)
Dept: CARDIOLOGY | Facility: CLINIC | Age: 66
End: 2021-04-05
Payer: MEDICARE

## 2021-04-05 DIAGNOSIS — Z95.811 LVAD (LEFT VENTRICULAR ASSIST DEVICE) PRESENT: Primary | ICD-10-CM

## 2021-04-05 LAB — INR PPP: 1.9

## 2021-04-05 PROCEDURE — 93793 PR ANTICOAGULANT MGMT FOR PT TAKING WARFARIN: ICD-10-PCS | Mod: S$GLB,,,

## 2021-04-05 PROCEDURE — 93793 ANTICOAG MGMT PT WARFARIN: CPT | Mod: S$GLB,,,

## 2021-04-07 ENCOUNTER — TELEPHONE (OUTPATIENT)
Dept: TRANSPLANT | Facility: CLINIC | Age: 66
End: 2021-04-07

## 2021-04-08 ENCOUNTER — CLINICAL SUPPORT (OUTPATIENT)
Dept: TRANSPLANT | Facility: CLINIC | Age: 66
End: 2021-04-08
Payer: MEDICARE

## 2021-04-08 ENCOUNTER — HOSPITAL ENCOUNTER (OUTPATIENT)
Dept: PULMONOLOGY | Facility: CLINIC | Age: 66
Discharge: HOME | End: 2021-04-08
Payer: MEDICARE

## 2021-04-08 ENCOUNTER — ANTI-COAG VISIT (OUTPATIENT)
Dept: CARDIOLOGY | Facility: CLINIC | Age: 66
End: 2021-04-08
Payer: MEDICARE

## 2021-04-08 ENCOUNTER — OFFICE VISIT (OUTPATIENT)
Dept: TRANSPLANT | Facility: CLINIC | Age: 66
End: 2021-04-08
Payer: MEDICARE

## 2021-04-08 VITALS — HEIGHT: 74 IN | WEIGHT: 204 LBS | BODY MASS INDEX: 26.18 KG/M2

## 2021-04-08 VITALS
TEMPERATURE: 98 F | HEIGHT: 74 IN | HEART RATE: 94 BPM | SYSTOLIC BLOOD PRESSURE: 102 MMHG | BODY MASS INDEX: 26.05 KG/M2 | WEIGHT: 203 LBS

## 2021-04-08 DIAGNOSIS — Z95.811 LVAD (LEFT VENTRICULAR ASSIST DEVICE) PRESENT: Primary | ICD-10-CM

## 2021-04-08 DIAGNOSIS — I10 ESSENTIAL HYPERTENSION: Primary | ICD-10-CM

## 2021-04-08 DIAGNOSIS — Z95.811 HEART REPLACED BY HEART ASSIST DEVICE: ICD-10-CM

## 2021-04-08 DIAGNOSIS — Z95.811 LVAD (LEFT VENTRICULAR ASSIST DEVICE) PRESENT: ICD-10-CM

## 2021-04-08 PROCEDURE — 1157F PR ADVANCE CARE PLAN OR EQUIV PRESENT IN MEDICAL RECORD: ICD-10-PCS | Mod: S$GLB,,, | Performed by: INTERNAL MEDICINE

## 2021-04-08 PROCEDURE — 93793 ANTICOAG MGMT PT WARFARIN: CPT | Mod: S$GLB,,,

## 2021-04-08 PROCEDURE — 93750 OP LVAD INTERROGATION: ICD-10-PCS | Mod: S$GLB,,, | Performed by: INTERNAL MEDICINE

## 2021-04-08 PROCEDURE — 93750 INTERROGATION VAD IN PERSON: CPT | Mod: S$GLB,,, | Performed by: INTERNAL MEDICINE

## 2021-04-08 PROCEDURE — 99214 PR OFFICE/OUTPT VISIT, EST, LEVL IV, 30-39 MIN: ICD-10-PCS | Mod: S$GLB,,, | Performed by: INTERNAL MEDICINE

## 2021-04-08 PROCEDURE — 93793 PR ANTICOAGULANT MGMT FOR PT TAKING WARFARIN: ICD-10-PCS | Mod: S$GLB,,,

## 2021-04-08 PROCEDURE — 3008F BODY MASS INDEX DOCD: CPT | Mod: CPTII,S$GLB,, | Performed by: INTERNAL MEDICINE

## 2021-04-08 PROCEDURE — 1157F ADVNC CARE PLAN IN RCRD: CPT | Mod: S$GLB,,, | Performed by: INTERNAL MEDICINE

## 2021-04-08 PROCEDURE — 99999 PR PBB SHADOW E&M-EST. PATIENT-LVL IV: CPT | Mod: PBBFAC,,, | Performed by: INTERNAL MEDICINE

## 2021-04-08 PROCEDURE — 94618 PULMONARY STRESS TESTING: CPT | Mod: S$GLB,,, | Performed by: INTERNAL MEDICINE

## 2021-04-08 PROCEDURE — 3008F PR BODY MASS INDEX (BMI) DOCUMENTED: ICD-10-PCS | Mod: CPTII,S$GLB,, | Performed by: INTERNAL MEDICINE

## 2021-04-08 PROCEDURE — 1126F PR PAIN SEVERITY QUANTIFIED, NO PAIN PRESENT: ICD-10-PCS | Mod: S$GLB,,, | Performed by: INTERNAL MEDICINE

## 2021-04-08 PROCEDURE — 94618 PULMONARY STRESS TESTING: ICD-10-PCS | Mod: S$GLB,,, | Performed by: INTERNAL MEDICINE

## 2021-04-08 PROCEDURE — 99999 PR PBB SHADOW E&M-EST. PATIENT-LVL IV: ICD-10-PCS | Mod: PBBFAC,,, | Performed by: INTERNAL MEDICINE

## 2021-04-08 PROCEDURE — 99214 OFFICE O/P EST MOD 30 MIN: CPT | Mod: S$GLB,,, | Performed by: INTERNAL MEDICINE

## 2021-04-08 PROCEDURE — 1126F AMNT PAIN NOTED NONE PRSNT: CPT | Mod: S$GLB,,, | Performed by: INTERNAL MEDICINE

## 2021-04-08 RX ORDER — LISINOPRIL 10 MG/1
10 TABLET ORAL 2 TIMES DAILY
Qty: 180 TABLET | Refills: 3 | Status: SHIPPED | OUTPATIENT
Start: 2021-04-08 | End: 2021-05-06 | Stop reason: SDUPTHER

## 2021-04-09 ENCOUNTER — TELEPHONE (OUTPATIENT)
Dept: TRANSPLANT | Facility: CLINIC | Age: 66
End: 2021-04-09

## 2021-04-12 ENCOUNTER — ANTI-COAG VISIT (OUTPATIENT)
Dept: CARDIOLOGY | Facility: CLINIC | Age: 66
End: 2021-04-12
Payer: MEDICARE

## 2021-04-12 DIAGNOSIS — Z95.811 LVAD (LEFT VENTRICULAR ASSIST DEVICE) PRESENT: Primary | ICD-10-CM

## 2021-04-12 LAB
BILIRUBIN TOTAL: 2.1
EXT ALBUMIN: 3.4
EXT ALT: 6
EXT AST: 18
EXT BUN: 16.46
EXT CALCIUM: 8.7
EXT CHLORIDE: 105
EXT CREATININE: 1.14 MG/DL
EXT GLUCOSE: 129
EXT POTASSIUM: 3.7
EXT PROTEIN TOTAL: 8.4
EXT SODIUM: 137 MMOL/L
INR PPP: 2.5

## 2021-04-12 PROCEDURE — 93793 ANTICOAG MGMT PT WARFARIN: CPT | Mod: S$GLB,,,

## 2021-04-12 PROCEDURE — 93793 PR ANTICOAGULANT MGMT FOR PT TAKING WARFARIN: ICD-10-PCS | Mod: S$GLB,,,

## 2021-04-13 ENCOUNTER — TELEPHONE (OUTPATIENT)
Dept: TRANSPLANT | Facility: CLINIC | Age: 66
End: 2021-04-13

## 2021-04-15 ENCOUNTER — TELEPHONE (OUTPATIENT)
Dept: TRANSPLANT | Facility: CLINIC | Age: 66
End: 2021-04-15

## 2021-04-16 ENCOUNTER — ANTI-COAG VISIT (OUTPATIENT)
Dept: CARDIOLOGY | Facility: CLINIC | Age: 66
End: 2021-04-16
Payer: MEDICARE

## 2021-04-16 DIAGNOSIS — Z95.811 LVAD (LEFT VENTRICULAR ASSIST DEVICE) PRESENT: Primary | ICD-10-CM

## 2021-04-16 LAB — INR PPP: 3

## 2021-04-16 PROCEDURE — 93793 PR ANTICOAGULANT MGMT FOR PT TAKING WARFARIN: ICD-10-PCS | Mod: S$GLB,,,

## 2021-04-16 PROCEDURE — 93793 ANTICOAG MGMT PT WARFARIN: CPT | Mod: S$GLB,,,

## 2021-04-20 ENCOUNTER — CLINICAL SUPPORT (OUTPATIENT)
Dept: TRANSPLANT | Facility: CLINIC | Age: 66
End: 2021-04-20
Payer: MEDICARE

## 2021-04-20 ENCOUNTER — ANTI-COAG VISIT (OUTPATIENT)
Dept: CARDIOLOGY | Facility: CLINIC | Age: 66
End: 2021-04-20
Payer: MEDICARE

## 2021-04-20 VITALS — SYSTOLIC BLOOD PRESSURE: 92 MMHG

## 2021-04-20 DIAGNOSIS — Z95.811 LVAD (LEFT VENTRICULAR ASSIST DEVICE) PRESENT: Primary | ICD-10-CM

## 2021-04-20 LAB — INR PPP: 2.2

## 2021-04-20 PROCEDURE — 93793 PR ANTICOAGULANT MGMT FOR PT TAKING WARFARIN: ICD-10-PCS | Mod: S$GLB,,,

## 2021-04-20 PROCEDURE — 93793 ANTICOAG MGMT PT WARFARIN: CPT | Mod: S$GLB,,,

## 2021-04-26 ENCOUNTER — ANTI-COAG VISIT (OUTPATIENT)
Dept: CARDIOLOGY | Facility: CLINIC | Age: 66
End: 2021-04-26
Payer: MEDICARE

## 2021-04-26 DIAGNOSIS — Z95.811 LVAD (LEFT VENTRICULAR ASSIST DEVICE) PRESENT: Primary | ICD-10-CM

## 2021-04-26 LAB — INR PPP: 2.5

## 2021-04-26 PROCEDURE — 93793 PR ANTICOAGULANT MGMT FOR PT TAKING WARFARIN: ICD-10-PCS | Mod: S$GLB,,,

## 2021-04-26 PROCEDURE — 93793 ANTICOAG MGMT PT WARFARIN: CPT | Mod: S$GLB,,,

## 2021-05-03 ENCOUNTER — ANTI-COAG VISIT (OUTPATIENT)
Dept: CARDIOLOGY | Facility: CLINIC | Age: 66
End: 2021-05-03
Payer: MEDICARE

## 2021-05-03 DIAGNOSIS — Z95.811 LVAD (LEFT VENTRICULAR ASSIST DEVICE) PRESENT: Primary | ICD-10-CM

## 2021-05-03 LAB — INR PPP: 2.1

## 2021-05-03 PROCEDURE — 93793 PR ANTICOAGULANT MGMT FOR PT TAKING WARFARIN: ICD-10-PCS | Mod: S$GLB,,,

## 2021-05-03 PROCEDURE — 93793 ANTICOAG MGMT PT WARFARIN: CPT | Mod: S$GLB,,,

## 2021-05-06 ENCOUNTER — OFFICE VISIT (OUTPATIENT)
Dept: TRANSPLANT | Facility: CLINIC | Age: 66
End: 2021-05-06
Payer: MEDICARE

## 2021-05-06 ENCOUNTER — CLINICAL SUPPORT (OUTPATIENT)
Dept: TRANSPLANT | Facility: CLINIC | Age: 66
End: 2021-05-06
Payer: MEDICARE

## 2021-05-06 ENCOUNTER — LAB VISIT (OUTPATIENT)
Dept: LAB | Facility: HOSPITAL | Age: 66
End: 2021-05-06
Attending: INTERNAL MEDICINE
Payer: MEDICARE

## 2021-05-06 ENCOUNTER — ANTI-COAG VISIT (OUTPATIENT)
Dept: CARDIOLOGY | Facility: CLINIC | Age: 66
End: 2021-05-06
Payer: MEDICARE

## 2021-05-06 ENCOUNTER — PATIENT MESSAGE (OUTPATIENT)
Dept: RESEARCH | Facility: HOSPITAL | Age: 66
End: 2021-05-06

## 2021-05-06 VITALS
WEIGHT: 200 LBS | SYSTOLIC BLOOD PRESSURE: 95 MMHG | BODY MASS INDEX: 25.67 KG/M2 | HEIGHT: 74 IN | TEMPERATURE: 98 F | HEART RATE: 95 BPM

## 2021-05-06 DIAGNOSIS — Z95.811 LVAD (LEFT VENTRICULAR ASSIST DEVICE) PRESENT: Primary | ICD-10-CM

## 2021-05-06 DIAGNOSIS — Z95.811 HEART REPLACED BY HEART ASSIST DEVICE: ICD-10-CM

## 2021-05-06 DIAGNOSIS — I10 ESSENTIAL HYPERTENSION: ICD-10-CM

## 2021-05-06 DIAGNOSIS — I42.8 NICM (NONISCHEMIC CARDIOMYOPATHY): ICD-10-CM

## 2021-05-06 LAB
ALBUMIN SERPL BCP-MCNC: 3.2 G/DL (ref 3.5–5.2)
ALP SERPL-CCNC: 90 U/L (ref 55–135)
ALT SERPL W/O P-5'-P-CCNC: 6 U/L (ref 10–44)
ANION GAP SERPL CALC-SCNC: 9 MMOL/L (ref 8–16)
AST SERPL-CCNC: 15 U/L (ref 10–40)
BASOPHILS # BLD AUTO: 0.04 K/UL (ref 0–0.2)
BASOPHILS NFR BLD: 0.5 % (ref 0–1.9)
BILIRUB DIRECT SERPL-MCNC: 0.8 MG/DL (ref 0.1–0.3)
BILIRUB SERPL-MCNC: 1.9 MG/DL (ref 0.1–1)
BNP SERPL-MCNC: 582 PG/ML (ref 0–99)
BUN SERPL-MCNC: 18 MG/DL (ref 8–23)
CALCIUM SERPL-MCNC: 9.2 MG/DL (ref 8.7–10.5)
CHLORIDE SERPL-SCNC: 105 MMOL/L (ref 95–110)
CO2 SERPL-SCNC: 25 MMOL/L (ref 23–29)
CREAT SERPL-MCNC: 1.1 MG/DL (ref 0.5–1.4)
CRP SERPL-MCNC: 5.6 MG/L (ref 0–8.2)
DIFFERENTIAL METHOD: ABNORMAL
EOSINOPHIL # BLD AUTO: 0.2 K/UL (ref 0–0.5)
EOSINOPHIL NFR BLD: 2.6 % (ref 0–8)
ERYTHROCYTE [DISTWIDTH] IN BLOOD BY AUTOMATED COUNT: 14.1 % (ref 11.5–14.5)
EST. GFR  (AFRICAN AMERICAN): >60 ML/MIN/1.73 M^2
EST. GFR  (NON AFRICAN AMERICAN): >60 ML/MIN/1.73 M^2
GLUCOSE SERPL-MCNC: 149 MG/DL (ref 70–110)
HCT VFR BLD AUTO: 35.4 % (ref 40–54)
HGB BLD-MCNC: 11 G/DL (ref 14–18)
IMM GRANULOCYTES # BLD AUTO: 0.03 K/UL (ref 0–0.04)
IMM GRANULOCYTES NFR BLD AUTO: 0.4 % (ref 0–0.5)
INR PPP: 2 (ref 0.8–1.2)
LDH SERPL L TO P-CCNC: 184 U/L (ref 110–260)
LYMPHOCYTES # BLD AUTO: 1.5 K/UL (ref 1–4.8)
LYMPHOCYTES NFR BLD: 18.3 % (ref 18–48)
MAGNESIUM SERPL-MCNC: 1.8 MG/DL (ref 1.6–2.6)
MCH RBC QN AUTO: 27.8 PG (ref 27–31)
MCHC RBC AUTO-ENTMCNC: 31.1 G/DL (ref 32–36)
MCV RBC AUTO: 90 FL (ref 82–98)
MONOCYTES # BLD AUTO: 0.7 K/UL (ref 0.3–1)
MONOCYTES NFR BLD: 8.5 % (ref 4–15)
NEUTROPHILS # BLD AUTO: 5.7 K/UL (ref 1.8–7.7)
NEUTROPHILS NFR BLD: 69.7 % (ref 38–73)
NRBC BLD-RTO: 0 /100 WBC
PHOSPHATE SERPL-MCNC: 3 MG/DL (ref 2.7–4.5)
PLATELET # BLD AUTO: 206 K/UL (ref 150–450)
PMV BLD AUTO: 10.2 FL (ref 9.2–12.9)
POTASSIUM SERPL-SCNC: 3.9 MMOL/L (ref 3.5–5.1)
PREALB SERPL-MCNC: 13 MG/DL (ref 20–43)
PROT SERPL-MCNC: 8.5 G/DL (ref 6–8.4)
PROTHROMBIN TIME: 20.9 SEC (ref 9–12.5)
RBC # BLD AUTO: 3.95 M/UL (ref 4.6–6.2)
SODIUM SERPL-SCNC: 139 MMOL/L (ref 136–145)
WBC # BLD AUTO: 8.2 K/UL (ref 3.9–12.7)

## 2021-05-06 PROCEDURE — 99999 PR PBB SHADOW E&M-EST. PATIENT-LVL III: CPT | Mod: PBBFAC,,, | Performed by: INTERNAL MEDICINE

## 2021-05-06 PROCEDURE — 83880 ASSAY OF NATRIURETIC PEPTIDE: CPT | Performed by: INTERNAL MEDICINE

## 2021-05-06 PROCEDURE — 85610 PROTHROMBIN TIME: CPT | Performed by: INTERNAL MEDICINE

## 2021-05-06 PROCEDURE — 1101F PR PT FALLS ASSESS DOC 0-1 FALLS W/OUT INJ PAST YR: ICD-10-PCS | Mod: CPTII,S$GLB,, | Performed by: INTERNAL MEDICINE

## 2021-05-06 PROCEDURE — 84100 ASSAY OF PHOSPHORUS: CPT | Performed by: INTERNAL MEDICINE

## 2021-05-06 PROCEDURE — 3288F PR FALLS RISK ASSESSMENT DOCUMENTED: ICD-10-PCS | Mod: CPTII,S$GLB,, | Performed by: INTERNAL MEDICINE

## 2021-05-06 PROCEDURE — 1157F PR ADVANCE CARE PLAN OR EQUIV PRESENT IN MEDICAL RECORD: ICD-10-PCS | Mod: S$GLB,,, | Performed by: INTERNAL MEDICINE

## 2021-05-06 PROCEDURE — 86140 C-REACTIVE PROTEIN: CPT | Performed by: INTERNAL MEDICINE

## 2021-05-06 PROCEDURE — 80053 COMPREHEN METABOLIC PANEL: CPT | Performed by: INTERNAL MEDICINE

## 2021-05-06 PROCEDURE — 83735 ASSAY OF MAGNESIUM: CPT | Performed by: INTERNAL MEDICINE

## 2021-05-06 PROCEDURE — 1126F AMNT PAIN NOTED NONE PRSNT: CPT | Mod: S$GLB,,, | Performed by: INTERNAL MEDICINE

## 2021-05-06 PROCEDURE — 82248 BILIRUBIN DIRECT: CPT | Performed by: INTERNAL MEDICINE

## 2021-05-06 PROCEDURE — 36415 COLL VENOUS BLD VENIPUNCTURE: CPT | Performed by: INTERNAL MEDICINE

## 2021-05-06 PROCEDURE — 3008F PR BODY MASS INDEX (BMI) DOCUMENTED: ICD-10-PCS | Mod: CPTII,S$GLB,, | Performed by: INTERNAL MEDICINE

## 2021-05-06 PROCEDURE — 99214 PR OFFICE/OUTPT VISIT, EST, LEVL IV, 30-39 MIN: ICD-10-PCS | Mod: S$GLB,,, | Performed by: INTERNAL MEDICINE

## 2021-05-06 PROCEDURE — 83615 LACTATE (LD) (LDH) ENZYME: CPT | Performed by: INTERNAL MEDICINE

## 2021-05-06 PROCEDURE — 93750 OP LVAD INTERROGATION: ICD-10-PCS | Mod: S$GLB,,, | Performed by: INTERNAL MEDICINE

## 2021-05-06 PROCEDURE — 93750 INTERROGATION VAD IN PERSON: CPT | Mod: S$GLB,,, | Performed by: INTERNAL MEDICINE

## 2021-05-06 PROCEDURE — 1101F PT FALLS ASSESS-DOCD LE1/YR: CPT | Mod: CPTII,S$GLB,, | Performed by: INTERNAL MEDICINE

## 2021-05-06 PROCEDURE — 99214 OFFICE O/P EST MOD 30 MIN: CPT | Mod: S$GLB,,, | Performed by: INTERNAL MEDICINE

## 2021-05-06 PROCEDURE — 1126F PR PAIN SEVERITY QUANTIFIED, NO PAIN PRESENT: ICD-10-PCS | Mod: S$GLB,,, | Performed by: INTERNAL MEDICINE

## 2021-05-06 PROCEDURE — 85025 COMPLETE CBC W/AUTO DIFF WBC: CPT | Performed by: INTERNAL MEDICINE

## 2021-05-06 PROCEDURE — 3288F FALL RISK ASSESSMENT DOCD: CPT | Mod: CPTII,S$GLB,, | Performed by: INTERNAL MEDICINE

## 2021-05-06 PROCEDURE — 3008F BODY MASS INDEX DOCD: CPT | Mod: CPTII,S$GLB,, | Performed by: INTERNAL MEDICINE

## 2021-05-06 PROCEDURE — 1157F ADVNC CARE PLAN IN RCRD: CPT | Mod: S$GLB,,, | Performed by: INTERNAL MEDICINE

## 2021-05-06 PROCEDURE — 99999 PR PBB SHADOW E&M-EST. PATIENT-LVL III: ICD-10-PCS | Mod: PBBFAC,,, | Performed by: INTERNAL MEDICINE

## 2021-05-06 PROCEDURE — 84134 ASSAY OF PREALBUMIN: CPT | Performed by: INTERNAL MEDICINE

## 2021-05-06 RX ORDER — FUROSEMIDE 80 MG/1
80 TABLET ORAL
Qty: 30 TABLET | Refills: 3 | Status: SHIPPED | OUTPATIENT
Start: 2021-05-07 | End: 2021-11-03

## 2021-05-06 RX ORDER — LANOLIN ALCOHOL/MO/W.PET/CERES
1 CREAM (GRAM) TOPICAL 3 TIMES DAILY
Qty: 90 TABLET | Refills: 11 | Status: SHIPPED | OUTPATIENT
Start: 2021-05-06 | End: 2022-05-11 | Stop reason: SDUPTHER

## 2021-05-06 RX ORDER — LISINOPRIL 10 MG/1
10 TABLET ORAL 2 TIMES DAILY
Qty: 180 TABLET | Refills: 3 | Status: SHIPPED | OUTPATIENT
Start: 2021-05-06 | End: 2022-05-19 | Stop reason: SDUPTHER

## 2021-05-10 ENCOUNTER — ANTI-COAG VISIT (OUTPATIENT)
Dept: CARDIOLOGY | Facility: CLINIC | Age: 66
End: 2021-05-10
Payer: MEDICARE

## 2021-05-10 ENCOUNTER — PATIENT MESSAGE (OUTPATIENT)
Dept: RESEARCH | Facility: HOSPITAL | Age: 66
End: 2021-05-10

## 2021-05-10 DIAGNOSIS — Z95.811 LVAD (LEFT VENTRICULAR ASSIST DEVICE) PRESENT: Primary | ICD-10-CM

## 2021-05-10 LAB — INR PPP: 1.8

## 2021-05-10 PROCEDURE — 93793 PR ANTICOAGULANT MGMT FOR PT TAKING WARFARIN: ICD-10-PCS | Mod: S$GLB,,,

## 2021-05-10 PROCEDURE — 93793 ANTICOAG MGMT PT WARFARIN: CPT | Mod: S$GLB,,,

## 2021-05-13 ENCOUNTER — ANTI-COAG VISIT (OUTPATIENT)
Dept: CARDIOLOGY | Facility: CLINIC | Age: 66
End: 2021-05-13
Payer: MEDICARE

## 2021-05-13 DIAGNOSIS — Z95.811 LVAD (LEFT VENTRICULAR ASSIST DEVICE) PRESENT: Primary | ICD-10-CM

## 2021-05-13 LAB — INR PPP: 2.1

## 2021-05-13 PROCEDURE — 93793 ANTICOAG MGMT PT WARFARIN: CPT | Mod: S$GLB,,,

## 2021-05-13 PROCEDURE — 93793 PR ANTICOAGULANT MGMT FOR PT TAKING WARFARIN: ICD-10-PCS | Mod: S$GLB,,,

## 2021-05-14 NOTE — NURSING NOTE
Patient identified by 2 identifiers. Denies previous reactions to blood transfusions, allergies reviewed & procedure explained.  22 g IV placed to Rt AC, flushed w/ 10cc NS pre & post contrast administration.  3cc Optison administered by sonographer, echo images obtained.  Pt tolerated procedure well.  IV D/C'ed, preasure dsg applied.  Pt D/C'ed to home.   Yes

## 2021-05-17 ENCOUNTER — ANTI-COAG VISIT (OUTPATIENT)
Dept: CARDIOLOGY | Facility: CLINIC | Age: 66
End: 2021-05-17
Payer: MEDICARE

## 2021-05-17 DIAGNOSIS — Z95.811 LVAD (LEFT VENTRICULAR ASSIST DEVICE) PRESENT: Primary | ICD-10-CM

## 2021-05-17 LAB — INR PPP: 2.4

## 2021-05-17 PROCEDURE — 93793 ANTICOAG MGMT PT WARFARIN: CPT | Mod: S$GLB,,,

## 2021-05-17 PROCEDURE — 93793 PR ANTICOAGULANT MGMT FOR PT TAKING WARFARIN: ICD-10-PCS | Mod: S$GLB,,,

## 2021-05-24 ENCOUNTER — ANTI-COAG VISIT (OUTPATIENT)
Dept: CARDIOLOGY | Facility: CLINIC | Age: 66
End: 2021-05-24
Payer: MEDICARE

## 2021-05-24 DIAGNOSIS — Z95.811 LVAD (LEFT VENTRICULAR ASSIST DEVICE) PRESENT: Primary | ICD-10-CM

## 2021-05-24 LAB — INR PPP: 2.3

## 2021-05-24 PROCEDURE — 93793 ANTICOAG MGMT PT WARFARIN: CPT | Mod: S$GLB,,,

## 2021-05-24 PROCEDURE — 93793 PR ANTICOAGULANT MGMT FOR PT TAKING WARFARIN: ICD-10-PCS | Mod: S$GLB,,,

## 2021-05-31 ENCOUNTER — ANTI-COAG VISIT (OUTPATIENT)
Dept: CARDIOLOGY | Facility: CLINIC | Age: 66
End: 2021-05-31
Payer: MEDICARE

## 2021-05-31 DIAGNOSIS — Z95.811 LVAD (LEFT VENTRICULAR ASSIST DEVICE) PRESENT: Primary | ICD-10-CM

## 2021-05-31 LAB — INR PPP: 3

## 2021-05-31 PROCEDURE — 93793 PR ANTICOAGULANT MGMT FOR PT TAKING WARFARIN: ICD-10-PCS | Mod: S$GLB,,,

## 2021-05-31 PROCEDURE — 93793 ANTICOAG MGMT PT WARFARIN: CPT | Mod: S$GLB,,,

## 2021-06-07 ENCOUNTER — ANTI-COAG VISIT (OUTPATIENT)
Dept: CARDIOLOGY | Facility: CLINIC | Age: 66
End: 2021-06-07
Payer: MEDICARE

## 2021-06-07 DIAGNOSIS — Z95.811 LVAD (LEFT VENTRICULAR ASSIST DEVICE) PRESENT: Primary | ICD-10-CM

## 2021-06-07 LAB — INR PPP: 2.9

## 2021-06-07 PROCEDURE — 93793 ANTICOAG MGMT PT WARFARIN: CPT | Mod: S$GLB,,,

## 2021-06-07 PROCEDURE — 93793 PR ANTICOAGULANT MGMT FOR PT TAKING WARFARIN: ICD-10-PCS | Mod: S$GLB,,,

## 2021-06-10 ENCOUNTER — OFFICE VISIT (OUTPATIENT)
Dept: TRANSPLANT | Facility: CLINIC | Age: 66
End: 2021-06-10
Payer: MEDICARE

## 2021-06-10 ENCOUNTER — CLINICAL SUPPORT (OUTPATIENT)
Dept: TRANSPLANT | Facility: CLINIC | Age: 66
End: 2021-06-10
Payer: MEDICARE

## 2021-06-10 ENCOUNTER — LAB VISIT (OUTPATIENT)
Dept: LAB | Facility: HOSPITAL | Age: 66
End: 2021-06-10
Attending: INTERNAL MEDICINE
Payer: MEDICARE

## 2021-06-10 ENCOUNTER — ANTI-COAG VISIT (OUTPATIENT)
Dept: CARDIOLOGY | Facility: CLINIC | Age: 66
End: 2021-06-10
Payer: MEDICARE

## 2021-06-10 VITALS
HEIGHT: 74 IN | HEART RATE: 94 BPM | BODY MASS INDEX: 25.15 KG/M2 | TEMPERATURE: 98 F | SYSTOLIC BLOOD PRESSURE: 88 MMHG | WEIGHT: 196 LBS

## 2021-06-10 DIAGNOSIS — Z95.811 HEART REPLACED BY HEART ASSIST DEVICE: Primary | ICD-10-CM

## 2021-06-10 DIAGNOSIS — Z95.811 HEART REPLACED BY HEART ASSIST DEVICE: ICD-10-CM

## 2021-06-10 DIAGNOSIS — I10 ESSENTIAL HYPERTENSION: ICD-10-CM

## 2021-06-10 DIAGNOSIS — E78.2 MIXED HYPERLIPIDEMIA: ICD-10-CM

## 2021-06-10 DIAGNOSIS — I50.42 CHRONIC COMBINED SYSTOLIC AND DIASTOLIC CONGESTIVE HEART FAILURE: ICD-10-CM

## 2021-06-10 DIAGNOSIS — Z95.811 LVAD (LEFT VENTRICULAR ASSIST DEVICE) PRESENT: Primary | ICD-10-CM

## 2021-06-10 DIAGNOSIS — I42.8 NICM (NONISCHEMIC CARDIOMYOPATHY): ICD-10-CM

## 2021-06-10 DIAGNOSIS — Z95.810 AICD (AUTOMATIC CARDIOVERTER/DEFIBRILLATOR) PRESENT: ICD-10-CM

## 2021-06-10 DIAGNOSIS — R93.3 ABNORMAL FINDINGS ON DIAGNOSTIC IMAGING OF OTHER PARTS OF DIGESTIVE TRACT: ICD-10-CM

## 2021-06-10 LAB
ALBUMIN SERPL BCP-MCNC: 3 G/DL (ref 3.5–5.2)
ALP SERPL-CCNC: 102 U/L (ref 55–135)
ALT SERPL W/O P-5'-P-CCNC: 5 U/L (ref 10–44)
ANION GAP SERPL CALC-SCNC: 12 MMOL/L (ref 8–16)
AST SERPL-CCNC: 13 U/L (ref 10–40)
BASOPHILS # BLD AUTO: 0.03 K/UL (ref 0–0.2)
BASOPHILS NFR BLD: 0.4 % (ref 0–1.9)
BILIRUB DIRECT SERPL-MCNC: 0.7 MG/DL (ref 0.1–0.3)
BILIRUB SERPL-MCNC: 3.1 MG/DL (ref 0.1–1)
BNP SERPL-MCNC: 365 PG/ML (ref 0–99)
BUN SERPL-MCNC: 15 MG/DL (ref 8–23)
CALCIUM SERPL-MCNC: 9.2 MG/DL (ref 8.7–10.5)
CHLORIDE SERPL-SCNC: 104 MMOL/L (ref 95–110)
CO2 SERPL-SCNC: 23 MMOL/L (ref 23–29)
CREAT SERPL-MCNC: 1.2 MG/DL (ref 0.5–1.4)
CRP SERPL-MCNC: 13.6 MG/L (ref 0–8.2)
DIFFERENTIAL METHOD: ABNORMAL
EOSINOPHIL # BLD AUTO: 0.3 K/UL (ref 0–0.5)
EOSINOPHIL NFR BLD: 3.2 % (ref 0–8)
ERYTHROCYTE [DISTWIDTH] IN BLOOD BY AUTOMATED COUNT: 15.7 % (ref 11.5–14.5)
EST. GFR  (AFRICAN AMERICAN): >60 ML/MIN/1.73 M^2
EST. GFR  (NON AFRICAN AMERICAN): >60 ML/MIN/1.73 M^2
GLUCOSE SERPL-MCNC: 161 MG/DL (ref 70–110)
HCT VFR BLD AUTO: 36.4 % (ref 40–54)
HGB BLD-MCNC: 11.2 G/DL (ref 14–18)
IMM GRANULOCYTES # BLD AUTO: 0.02 K/UL (ref 0–0.04)
IMM GRANULOCYTES NFR BLD AUTO: 0.2 % (ref 0–0.5)
INR PPP: 2.5 (ref 0.8–1.2)
LDH SERPL L TO P-CCNC: 211 U/L (ref 110–260)
LYMPHOCYTES # BLD AUTO: 1.5 K/UL (ref 1–4.8)
LYMPHOCYTES NFR BLD: 17.2 % (ref 18–48)
MAGNESIUM SERPL-MCNC: 1.9 MG/DL (ref 1.6–2.6)
MCH RBC QN AUTO: 26.4 PG (ref 27–31)
MCHC RBC AUTO-ENTMCNC: 30.8 G/DL (ref 32–36)
MCV RBC AUTO: 86 FL (ref 82–98)
MONOCYTES # BLD AUTO: 0.7 K/UL (ref 0.3–1)
MONOCYTES NFR BLD: 8.2 % (ref 4–15)
NEUTROPHILS # BLD AUTO: 6 K/UL (ref 1.8–7.7)
NEUTROPHILS NFR BLD: 70.8 % (ref 38–73)
NRBC BLD-RTO: 0 /100 WBC
PHOSPHATE SERPL-MCNC: 2.5 MG/DL (ref 2.7–4.5)
PLATELET # BLD AUTO: 195 K/UL (ref 150–450)
PMV BLD AUTO: 10.1 FL (ref 9.2–12.9)
POTASSIUM SERPL-SCNC: 3.7 MMOL/L (ref 3.5–5.1)
PREALB SERPL-MCNC: 11 MG/DL (ref 20–43)
PROT SERPL-MCNC: 8.3 G/DL (ref 6–8.4)
PROTHROMBIN TIME: 25.5 SEC (ref 9–12.5)
RBC # BLD AUTO: 4.25 M/UL (ref 4.6–6.2)
SODIUM SERPL-SCNC: 139 MMOL/L (ref 136–145)
WBC # BLD AUTO: 8.43 K/UL (ref 3.9–12.7)

## 2021-06-10 PROCEDURE — 99214 PR OFFICE/OUTPT VISIT, EST, LEVL IV, 30-39 MIN: ICD-10-PCS | Mod: S$GLB,,, | Performed by: INTERNAL MEDICINE

## 2021-06-10 PROCEDURE — 82248 BILIRUBIN DIRECT: CPT | Performed by: INTERNAL MEDICINE

## 2021-06-10 PROCEDURE — 1126F PR PAIN SEVERITY QUANTIFIED, NO PAIN PRESENT: ICD-10-PCS | Mod: S$GLB,,, | Performed by: INTERNAL MEDICINE

## 2021-06-10 PROCEDURE — 83615 LACTATE (LD) (LDH) ENZYME: CPT | Performed by: INTERNAL MEDICINE

## 2021-06-10 PROCEDURE — 99999 PR PBB SHADOW E&M-EST. PATIENT-LVL IV: ICD-10-PCS | Mod: PBBFAC,,, | Performed by: INTERNAL MEDICINE

## 2021-06-10 PROCEDURE — 3288F FALL RISK ASSESSMENT DOCD: CPT | Mod: CPTII,S$GLB,, | Performed by: INTERNAL MEDICINE

## 2021-06-10 PROCEDURE — 1157F ADVNC CARE PLAN IN RCRD: CPT | Mod: S$GLB,,, | Performed by: INTERNAL MEDICINE

## 2021-06-10 PROCEDURE — 80053 COMPREHEN METABOLIC PANEL: CPT | Performed by: INTERNAL MEDICINE

## 2021-06-10 PROCEDURE — 83880 ASSAY OF NATRIURETIC PEPTIDE: CPT | Performed by: INTERNAL MEDICINE

## 2021-06-10 PROCEDURE — 3008F PR BODY MASS INDEX (BMI) DOCUMENTED: ICD-10-PCS | Mod: CPTII,S$GLB,, | Performed by: INTERNAL MEDICINE

## 2021-06-10 PROCEDURE — 1157F PR ADVANCE CARE PLAN OR EQUIV PRESENT IN MEDICAL RECORD: ICD-10-PCS | Mod: S$GLB,,, | Performed by: INTERNAL MEDICINE

## 2021-06-10 PROCEDURE — 3288F PR FALLS RISK ASSESSMENT DOCUMENTED: ICD-10-PCS | Mod: CPTII,S$GLB,, | Performed by: INTERNAL MEDICINE

## 2021-06-10 PROCEDURE — 3008F BODY MASS INDEX DOCD: CPT | Mod: CPTII,S$GLB,, | Performed by: INTERNAL MEDICINE

## 2021-06-10 PROCEDURE — 99999 PR PBB SHADOW E&M-EST. PATIENT-LVL IV: CPT | Mod: PBBFAC,,, | Performed by: INTERNAL MEDICINE

## 2021-06-10 PROCEDURE — 36415 COLL VENOUS BLD VENIPUNCTURE: CPT | Performed by: INTERNAL MEDICINE

## 2021-06-10 PROCEDURE — 99214 OFFICE O/P EST MOD 30 MIN: CPT | Mod: S$GLB,,, | Performed by: INTERNAL MEDICINE

## 2021-06-10 PROCEDURE — 84134 ASSAY OF PREALBUMIN: CPT | Performed by: INTERNAL MEDICINE

## 2021-06-10 PROCEDURE — 85610 PROTHROMBIN TIME: CPT | Performed by: INTERNAL MEDICINE

## 2021-06-10 PROCEDURE — 84100 ASSAY OF PHOSPHORUS: CPT | Performed by: INTERNAL MEDICINE

## 2021-06-10 PROCEDURE — 1101F PR PT FALLS ASSESS DOC 0-1 FALLS W/OUT INJ PAST YR: ICD-10-PCS | Mod: CPTII,S$GLB,, | Performed by: INTERNAL MEDICINE

## 2021-06-10 PROCEDURE — 93750 INTERROGATION VAD IN PERSON: CPT | Mod: S$GLB,,, | Performed by: INTERNAL MEDICINE

## 2021-06-10 PROCEDURE — 1101F PT FALLS ASSESS-DOCD LE1/YR: CPT | Mod: CPTII,S$GLB,, | Performed by: INTERNAL MEDICINE

## 2021-06-10 PROCEDURE — 1126F AMNT PAIN NOTED NONE PRSNT: CPT | Mod: S$GLB,,, | Performed by: INTERNAL MEDICINE

## 2021-06-10 PROCEDURE — 85025 COMPLETE CBC W/AUTO DIFF WBC: CPT | Performed by: INTERNAL MEDICINE

## 2021-06-10 PROCEDURE — 93750 OP LVAD INTERROGATION: ICD-10-PCS | Mod: S$GLB,,, | Performed by: INTERNAL MEDICINE

## 2021-06-10 PROCEDURE — 83735 ASSAY OF MAGNESIUM: CPT | Performed by: INTERNAL MEDICINE

## 2021-06-10 PROCEDURE — 86140 C-REACTIVE PROTEIN: CPT | Performed by: INTERNAL MEDICINE

## 2021-06-14 ENCOUNTER — ANTI-COAG VISIT (OUTPATIENT)
Dept: CARDIOLOGY | Facility: CLINIC | Age: 66
End: 2021-06-14
Payer: MEDICARE

## 2021-06-14 DIAGNOSIS — Z95.811 LVAD (LEFT VENTRICULAR ASSIST DEVICE) PRESENT: Primary | ICD-10-CM

## 2021-06-14 LAB — INR PPP: 2

## 2021-06-14 PROCEDURE — 93793 PR ANTICOAGULANT MGMT FOR PT TAKING WARFARIN: ICD-10-PCS | Mod: S$GLB,,,

## 2021-06-14 PROCEDURE — 93793 ANTICOAG MGMT PT WARFARIN: CPT | Mod: S$GLB,,,

## 2021-06-17 ENCOUNTER — PATIENT MESSAGE (OUTPATIENT)
Dept: CARDIOTHORACIC SURGERY | Facility: CLINIC | Age: 66
End: 2021-06-17

## 2021-06-17 DIAGNOSIS — Z95.811 LVAD (LEFT VENTRICULAR ASSIST DEVICE) PRESENT: Primary | ICD-10-CM

## 2021-06-21 ENCOUNTER — ANTI-COAG VISIT (OUTPATIENT)
Dept: CARDIOLOGY | Facility: CLINIC | Age: 66
End: 2021-06-21
Payer: MEDICARE

## 2021-06-21 DIAGNOSIS — Z95.811 LVAD (LEFT VENTRICULAR ASSIST DEVICE) PRESENT: Primary | ICD-10-CM

## 2021-06-21 LAB — INR PPP: 1.8

## 2021-06-21 PROCEDURE — 93793 PR ANTICOAGULANT MGMT FOR PT TAKING WARFARIN: ICD-10-PCS | Mod: S$GLB,,,

## 2021-06-21 PROCEDURE — 93793 ANTICOAG MGMT PT WARFARIN: CPT | Mod: S$GLB,,,

## 2021-06-24 ENCOUNTER — ANTI-COAG VISIT (OUTPATIENT)
Dept: CARDIOLOGY | Facility: CLINIC | Age: 66
End: 2021-06-24
Payer: MEDICARE

## 2021-06-24 DIAGNOSIS — Z95.811 LVAD (LEFT VENTRICULAR ASSIST DEVICE) PRESENT: Primary | ICD-10-CM

## 2021-06-24 LAB — INR PPP: 1.8

## 2021-06-24 PROCEDURE — 93793 PR ANTICOAGULANT MGMT FOR PT TAKING WARFARIN: ICD-10-PCS | Mod: S$GLB,,,

## 2021-06-24 PROCEDURE — 93793 ANTICOAG MGMT PT WARFARIN: CPT | Mod: S$GLB,,,

## 2021-06-29 ENCOUNTER — ANTI-COAG VISIT (OUTPATIENT)
Dept: CARDIOLOGY | Facility: CLINIC | Age: 66
End: 2021-06-29
Payer: MEDICARE

## 2021-06-29 DIAGNOSIS — Z95.811 LVAD (LEFT VENTRICULAR ASSIST DEVICE) PRESENT: Primary | ICD-10-CM

## 2021-06-29 LAB — INR PPP: 2

## 2021-06-29 PROCEDURE — 93793 PR ANTICOAGULANT MGMT FOR PT TAKING WARFARIN: ICD-10-PCS | Mod: S$GLB,,,

## 2021-06-29 PROCEDURE — 93793 ANTICOAG MGMT PT WARFARIN: CPT | Mod: S$GLB,,,

## 2021-07-01 ENCOUNTER — PATIENT MESSAGE (OUTPATIENT)
Dept: TRANSPLANT | Facility: CLINIC | Age: 66
End: 2021-07-01

## 2021-07-01 ENCOUNTER — ANTI-COAG VISIT (OUTPATIENT)
Dept: CARDIOLOGY | Facility: CLINIC | Age: 66
End: 2021-07-01
Payer: MEDICARE

## 2021-07-01 DIAGNOSIS — Z95.811 LVAD (LEFT VENTRICULAR ASSIST DEVICE) PRESENT: Primary | ICD-10-CM

## 2021-07-01 LAB — INR PPP: 2

## 2021-07-01 PROCEDURE — 93793 ANTICOAG MGMT PT WARFARIN: CPT | Mod: S$GLB,,,

## 2021-07-01 PROCEDURE — 93793 PR ANTICOAGULANT MGMT FOR PT TAKING WARFARIN: ICD-10-PCS | Mod: S$GLB,,,

## 2021-07-06 ENCOUNTER — ANTI-COAG VISIT (OUTPATIENT)
Dept: CARDIOLOGY | Facility: CLINIC | Age: 66
End: 2021-07-06
Payer: MEDICARE

## 2021-07-06 DIAGNOSIS — Z95.811 LVAD (LEFT VENTRICULAR ASSIST DEVICE) PRESENT: Primary | ICD-10-CM

## 2021-07-06 LAB — INR PPP: 2.8

## 2021-07-06 PROCEDURE — 93793 PR ANTICOAGULANT MGMT FOR PT TAKING WARFARIN: ICD-10-PCS | Mod: S$GLB,,,

## 2021-07-06 PROCEDURE — 93793 ANTICOAG MGMT PT WARFARIN: CPT | Mod: S$GLB,,,

## 2021-07-13 ENCOUNTER — ANTI-COAG VISIT (OUTPATIENT)
Dept: CARDIOLOGY | Facility: CLINIC | Age: 66
End: 2021-07-13
Payer: MEDICARE

## 2021-07-13 DIAGNOSIS — Z95.811 LVAD (LEFT VENTRICULAR ASSIST DEVICE) PRESENT: Primary | ICD-10-CM

## 2021-07-13 LAB — INR PPP: 3.5

## 2021-07-13 PROCEDURE — 93793 ANTICOAG MGMT PT WARFARIN: CPT | Mod: S$GLB,,,

## 2021-07-13 PROCEDURE — 93793 PR ANTICOAGULANT MGMT FOR PT TAKING WARFARIN: ICD-10-PCS | Mod: S$GLB,,,

## 2021-07-15 LAB — INR PPP: 3.3

## 2021-07-16 ENCOUNTER — ANTI-COAG VISIT (OUTPATIENT)
Dept: CARDIOLOGY | Facility: CLINIC | Age: 66
End: 2021-07-16
Payer: MEDICARE

## 2021-07-16 DIAGNOSIS — Z95.811 LVAD (LEFT VENTRICULAR ASSIST DEVICE) PRESENT: Primary | ICD-10-CM

## 2021-07-16 PROCEDURE — 93793 ANTICOAG MGMT PT WARFARIN: CPT | Mod: S$GLB,,,

## 2021-07-16 PROCEDURE — 93793 PR ANTICOAGULANT MGMT FOR PT TAKING WARFARIN: ICD-10-PCS | Mod: S$GLB,,,

## 2021-07-19 ENCOUNTER — ANTI-COAG VISIT (OUTPATIENT)
Dept: CARDIOLOGY | Facility: CLINIC | Age: 66
End: 2021-07-19
Payer: MEDICARE

## 2021-07-19 DIAGNOSIS — Z95.811 LVAD (LEFT VENTRICULAR ASSIST DEVICE) PRESENT: Primary | ICD-10-CM

## 2021-07-19 LAB — INR PPP: 2.4

## 2021-07-19 PROCEDURE — 93793 PR ANTICOAGULANT MGMT FOR PT TAKING WARFARIN: ICD-10-PCS | Mod: S$GLB,,,

## 2021-07-19 PROCEDURE — 93793 ANTICOAG MGMT PT WARFARIN: CPT | Mod: S$GLB,,,

## 2021-07-20 ENCOUNTER — TELEPHONE (OUTPATIENT)
Dept: TRANSPLANT | Facility: CLINIC | Age: 66
End: 2021-07-20

## 2021-07-21 ENCOUNTER — OFFICE VISIT (OUTPATIENT)
Dept: TRANSPLANT | Facility: CLINIC | Age: 66
End: 2021-07-21
Attending: INTERNAL MEDICINE
Payer: MEDICARE

## 2021-07-21 ENCOUNTER — CLINICAL SUPPORT (OUTPATIENT)
Dept: TRANSPLANT | Facility: CLINIC | Age: 66
End: 2021-07-21
Payer: MEDICARE

## 2021-07-21 ENCOUNTER — ANTI-COAG VISIT (OUTPATIENT)
Dept: CARDIOLOGY | Facility: CLINIC | Age: 66
End: 2021-07-21
Payer: MEDICARE

## 2021-07-21 VITALS — BODY MASS INDEX: 24.26 KG/M2 | WEIGHT: 189 LBS | HEIGHT: 74 IN | TEMPERATURE: 98 F | SYSTOLIC BLOOD PRESSURE: 90 MMHG

## 2021-07-21 DIAGNOSIS — I25.10 CORONARY ARTERY DISEASE INVOLVING NATIVE CORONARY ARTERY OF NATIVE HEART WITHOUT ANGINA PECTORIS: ICD-10-CM

## 2021-07-21 DIAGNOSIS — Z95.811 LVAD (LEFT VENTRICULAR ASSIST DEVICE) PRESENT: Primary | ICD-10-CM

## 2021-07-21 DIAGNOSIS — I42.8 NICM (NONISCHEMIC CARDIOMYOPATHY): ICD-10-CM

## 2021-07-21 DIAGNOSIS — E11.9 TYPE 2 DIABETES MELLITUS WITHOUT COMPLICATION, WITHOUT LONG-TERM CURRENT USE OF INSULIN: ICD-10-CM

## 2021-07-21 DIAGNOSIS — I10 ESSENTIAL HYPERTENSION: ICD-10-CM

## 2021-07-21 DIAGNOSIS — Z95.811 HEART REPLACED BY HEART ASSIST DEVICE: ICD-10-CM

## 2021-07-21 PROCEDURE — 1157F ADVNC CARE PLAN IN RCRD: CPT | Mod: CPTII,S$GLB,, | Performed by: INTERNAL MEDICINE

## 2021-07-21 PROCEDURE — 3044F HG A1C LEVEL LT 7.0%: CPT | Mod: CPTII,S$GLB,, | Performed by: INTERNAL MEDICINE

## 2021-07-21 PROCEDURE — 99999 PR PBB SHADOW E&M-EST. PATIENT-LVL IV: ICD-10-PCS | Mod: PBBFAC,,, | Performed by: INTERNAL MEDICINE

## 2021-07-21 PROCEDURE — 1126F PR PAIN SEVERITY QUANTIFIED, NO PAIN PRESENT: ICD-10-PCS | Mod: CPTII,S$GLB,, | Performed by: INTERNAL MEDICINE

## 2021-07-21 PROCEDURE — 93793 PR ANTICOAGULANT MGMT FOR PT TAKING WARFARIN: ICD-10-PCS | Mod: S$GLB,,,

## 2021-07-21 PROCEDURE — 3288F PR FALLS RISK ASSESSMENT DOCUMENTED: ICD-10-PCS | Mod: CPTII,S$GLB,, | Performed by: INTERNAL MEDICINE

## 2021-07-21 PROCEDURE — 99214 PR OFFICE/OUTPT VISIT, EST, LEVL IV, 30-39 MIN: ICD-10-PCS | Mod: S$GLB,,, | Performed by: INTERNAL MEDICINE

## 2021-07-21 PROCEDURE — 3044F PR MOST RECENT HEMOGLOBIN A1C LEVEL <7.0%: ICD-10-PCS | Mod: CPTII,S$GLB,, | Performed by: INTERNAL MEDICINE

## 2021-07-21 PROCEDURE — 1126F AMNT PAIN NOTED NONE PRSNT: CPT | Mod: CPTII,S$GLB,, | Performed by: INTERNAL MEDICINE

## 2021-07-21 PROCEDURE — 99214 OFFICE O/P EST MOD 30 MIN: CPT | Mod: S$GLB,,, | Performed by: INTERNAL MEDICINE

## 2021-07-21 PROCEDURE — 99999 PR PBB SHADOW E&M-EST. PATIENT-LVL IV: CPT | Mod: PBBFAC,,, | Performed by: INTERNAL MEDICINE

## 2021-07-21 PROCEDURE — 3008F BODY MASS INDEX DOCD: CPT | Mod: CPTII,S$GLB,, | Performed by: INTERNAL MEDICINE

## 2021-07-21 PROCEDURE — 1101F PR PT FALLS ASSESS DOC 0-1 FALLS W/OUT INJ PAST YR: ICD-10-PCS | Mod: CPTII,S$GLB,, | Performed by: INTERNAL MEDICINE

## 2021-07-21 PROCEDURE — 93750 OP LVAD INTERROGATION: ICD-10-PCS | Mod: S$GLB,,, | Performed by: INTERNAL MEDICINE

## 2021-07-21 PROCEDURE — 3288F FALL RISK ASSESSMENT DOCD: CPT | Mod: CPTII,S$GLB,, | Performed by: INTERNAL MEDICINE

## 2021-07-21 PROCEDURE — 4010F ACE/ARB THERAPY RXD/TAKEN: CPT | Mod: CPTII,S$GLB,, | Performed by: INTERNAL MEDICINE

## 2021-07-21 PROCEDURE — 1157F PR ADVANCE CARE PLAN OR EQUIV PRESENT IN MEDICAL RECORD: ICD-10-PCS | Mod: CPTII,S$GLB,, | Performed by: INTERNAL MEDICINE

## 2021-07-21 PROCEDURE — 4010F PR ACE/ARB THEARPY RXD/TAKEN: ICD-10-PCS | Mod: CPTII,S$GLB,, | Performed by: INTERNAL MEDICINE

## 2021-07-21 PROCEDURE — 93793 ANTICOAG MGMT PT WARFARIN: CPT | Mod: S$GLB,,,

## 2021-07-21 PROCEDURE — 93750 INTERROGATION VAD IN PERSON: CPT | Mod: S$GLB,,, | Performed by: INTERNAL MEDICINE

## 2021-07-21 PROCEDURE — 1101F PT FALLS ASSESS-DOCD LE1/YR: CPT | Mod: CPTII,S$GLB,, | Performed by: INTERNAL MEDICINE

## 2021-07-21 PROCEDURE — 3008F PR BODY MASS INDEX (BMI) DOCUMENTED: ICD-10-PCS | Mod: CPTII,S$GLB,, | Performed by: INTERNAL MEDICINE

## 2021-07-26 ENCOUNTER — PATIENT MESSAGE (OUTPATIENT)
Dept: TRANSPLANT | Facility: CLINIC | Age: 66
End: 2021-07-26

## 2021-07-26 ENCOUNTER — ANTI-COAG VISIT (OUTPATIENT)
Dept: CARDIOLOGY | Facility: CLINIC | Age: 66
End: 2021-07-26
Payer: MEDICARE

## 2021-07-26 DIAGNOSIS — Z95.811 LVAD (LEFT VENTRICULAR ASSIST DEVICE) PRESENT: Primary | ICD-10-CM

## 2021-07-26 LAB — INR PPP: 2.8

## 2021-07-26 PROCEDURE — 93793 PR ANTICOAGULANT MGMT FOR PT TAKING WARFARIN: ICD-10-PCS | Mod: S$GLB,,,

## 2021-07-26 PROCEDURE — 93793 ANTICOAG MGMT PT WARFARIN: CPT | Mod: S$GLB,,,

## 2021-07-29 ENCOUNTER — ANTI-COAG VISIT (OUTPATIENT)
Dept: CARDIOLOGY | Facility: CLINIC | Age: 66
End: 2021-07-29
Payer: MEDICARE

## 2021-07-29 DIAGNOSIS — Z95.811 LVAD (LEFT VENTRICULAR ASSIST DEVICE) PRESENT: Primary | ICD-10-CM

## 2021-07-29 LAB — INR PPP: 3.3

## 2021-07-29 PROCEDURE — 93793 ANTICOAG MGMT PT WARFARIN: CPT | Mod: S$GLB,,,

## 2021-07-29 PROCEDURE — 93793 PR ANTICOAGULANT MGMT FOR PT TAKING WARFARIN: ICD-10-PCS | Mod: S$GLB,,,

## 2021-08-03 ENCOUNTER — ANTI-COAG VISIT (OUTPATIENT)
Dept: CARDIOLOGY | Facility: CLINIC | Age: 66
End: 2021-08-03
Payer: MEDICARE

## 2021-08-03 DIAGNOSIS — Z95.811 LVAD (LEFT VENTRICULAR ASSIST DEVICE) PRESENT: Primary | ICD-10-CM

## 2021-08-03 LAB — INR PPP: 3.6

## 2021-08-03 PROCEDURE — 93793 ANTICOAG MGMT PT WARFARIN: CPT | Mod: S$GLB,,,

## 2021-08-03 PROCEDURE — 93793 PR ANTICOAGULANT MGMT FOR PT TAKING WARFARIN: ICD-10-PCS | Mod: S$GLB,,,

## 2021-08-06 ENCOUNTER — ANTI-COAG VISIT (OUTPATIENT)
Dept: CARDIOLOGY | Facility: CLINIC | Age: 66
End: 2021-08-06
Payer: MEDICARE

## 2021-08-06 DIAGNOSIS — Z95.811 LVAD (LEFT VENTRICULAR ASSIST DEVICE) PRESENT: Primary | ICD-10-CM

## 2021-08-06 LAB — INR PPP: 2.5

## 2021-08-06 PROCEDURE — 93793 ANTICOAG MGMT PT WARFARIN: CPT | Mod: S$GLB,,,

## 2021-08-06 PROCEDURE — 93793 PR ANTICOAGULANT MGMT FOR PT TAKING WARFARIN: ICD-10-PCS | Mod: S$GLB,,,

## 2021-08-10 ENCOUNTER — ANTI-COAG VISIT (OUTPATIENT)
Dept: CARDIOLOGY | Facility: CLINIC | Age: 66
End: 2021-08-10
Payer: MEDICARE

## 2021-08-10 DIAGNOSIS — Z95.811 LVAD (LEFT VENTRICULAR ASSIST DEVICE) PRESENT: Primary | ICD-10-CM

## 2021-08-10 LAB — INR PPP: 2

## 2021-08-10 PROCEDURE — 93793 ANTICOAG MGMT PT WARFARIN: CPT | Mod: S$GLB,,,

## 2021-08-10 PROCEDURE — 93793 PR ANTICOAGULANT MGMT FOR PT TAKING WARFARIN: ICD-10-PCS | Mod: S$GLB,,,

## 2021-08-16 ENCOUNTER — ANTI-COAG VISIT (OUTPATIENT)
Dept: CARDIOLOGY | Facility: CLINIC | Age: 66
End: 2021-08-16
Payer: MEDICARE

## 2021-08-16 DIAGNOSIS — Z95.811 LVAD (LEFT VENTRICULAR ASSIST DEVICE) PRESENT: Primary | ICD-10-CM

## 2021-08-16 LAB — INR PPP: 1.7

## 2021-08-16 PROCEDURE — 93793 ANTICOAG MGMT PT WARFARIN: CPT | Mod: S$GLB,,,

## 2021-08-16 PROCEDURE — 93793 PR ANTICOAGULANT MGMT FOR PT TAKING WARFARIN: ICD-10-PCS | Mod: S$GLB,,,

## 2021-08-19 ENCOUNTER — ANTI-COAG VISIT (OUTPATIENT)
Dept: CARDIOLOGY | Facility: CLINIC | Age: 66
End: 2021-08-19
Payer: MEDICARE

## 2021-08-19 DIAGNOSIS — Z95.811 LVAD (LEFT VENTRICULAR ASSIST DEVICE) PRESENT: Primary | ICD-10-CM

## 2021-08-19 LAB — INR PPP: 1.5

## 2021-08-19 PROCEDURE — 93793 ANTICOAG MGMT PT WARFARIN: CPT | Mod: S$GLB,,,

## 2021-08-19 PROCEDURE — 93793 PR ANTICOAGULANT MGMT FOR PT TAKING WARFARIN: ICD-10-PCS | Mod: S$GLB,,,

## 2021-08-23 ENCOUNTER — ANTI-COAG VISIT (OUTPATIENT)
Dept: CARDIOLOGY | Facility: CLINIC | Age: 66
End: 2021-08-23
Payer: MEDICARE

## 2021-08-23 DIAGNOSIS — Z95.811 LVAD (LEFT VENTRICULAR ASSIST DEVICE) PRESENT: Primary | ICD-10-CM

## 2021-08-23 LAB — INR PPP: 1.6

## 2021-08-23 PROCEDURE — 93793 PR ANTICOAGULANT MGMT FOR PT TAKING WARFARIN: ICD-10-PCS | Mod: S$GLB,,,

## 2021-08-23 PROCEDURE — 93793 ANTICOAG MGMT PT WARFARIN: CPT | Mod: S$GLB,,,

## 2021-08-26 ENCOUNTER — ANTI-COAG VISIT (OUTPATIENT)
Dept: CARDIOLOGY | Facility: CLINIC | Age: 66
End: 2021-08-26
Payer: MEDICARE

## 2021-08-26 DIAGNOSIS — Z95.811 LVAD (LEFT VENTRICULAR ASSIST DEVICE) PRESENT: Primary | ICD-10-CM

## 2021-08-26 LAB — INR PPP: 2.4

## 2021-08-26 PROCEDURE — 93793 PR ANTICOAGULANT MGMT FOR PT TAKING WARFARIN: ICD-10-PCS | Mod: S$GLB,,,

## 2021-08-26 PROCEDURE — 93793 ANTICOAG MGMT PT WARFARIN: CPT | Mod: S$GLB,,,

## 2021-08-31 ENCOUNTER — ANTI-COAG VISIT (OUTPATIENT)
Dept: CARDIOLOGY | Facility: CLINIC | Age: 66
End: 2021-08-31
Payer: MEDICARE

## 2021-08-31 DIAGNOSIS — Z95.811 LVAD (LEFT VENTRICULAR ASSIST DEVICE) PRESENT: Primary | ICD-10-CM

## 2021-08-31 LAB — INR PPP: 2

## 2021-08-31 PROCEDURE — 93793 ANTICOAG MGMT PT WARFARIN: CPT | Mod: S$GLB,,,

## 2021-08-31 PROCEDURE — 93793 PR ANTICOAGULANT MGMT FOR PT TAKING WARFARIN: ICD-10-PCS | Mod: S$GLB,,,

## 2021-09-07 ENCOUNTER — ANTI-COAG VISIT (OUTPATIENT)
Dept: CARDIOLOGY | Facility: CLINIC | Age: 66
End: 2021-09-07
Payer: MEDICARE

## 2021-09-07 DIAGNOSIS — Z95.811 LVAD (LEFT VENTRICULAR ASSIST DEVICE) PRESENT: Primary | ICD-10-CM

## 2021-09-07 LAB — INR PPP: 1.9

## 2021-09-07 PROCEDURE — 93793 PR ANTICOAGULANT MGMT FOR PT TAKING WARFARIN: ICD-10-PCS | Mod: S$GLB,,,

## 2021-09-07 PROCEDURE — 93793 ANTICOAG MGMT PT WARFARIN: CPT | Mod: S$GLB,,,

## 2021-09-14 ENCOUNTER — ANTI-COAG VISIT (OUTPATIENT)
Dept: CARDIOLOGY | Facility: CLINIC | Age: 66
End: 2021-09-14
Payer: MEDICARE

## 2021-09-14 DIAGNOSIS — Z95.811 LVAD (LEFT VENTRICULAR ASSIST DEVICE) PRESENT: Primary | ICD-10-CM

## 2021-09-14 LAB — INR PPP: 1.8

## 2021-09-14 PROCEDURE — 93793 PR ANTICOAGULANT MGMT FOR PT TAKING WARFARIN: ICD-10-PCS | Mod: S$GLB,,,

## 2021-09-14 PROCEDURE — 93793 ANTICOAG MGMT PT WARFARIN: CPT | Mod: S$GLB,,,

## 2021-09-16 ENCOUNTER — ANTI-COAG VISIT (OUTPATIENT)
Dept: CARDIOLOGY | Facility: CLINIC | Age: 66
End: 2021-09-16

## 2021-09-16 DIAGNOSIS — Z95.811 LVAD (LEFT VENTRICULAR ASSIST DEVICE) PRESENT: Primary | ICD-10-CM

## 2021-09-16 LAB — INR PPP: 1.9

## 2021-09-22 ENCOUNTER — HOSPITAL ENCOUNTER (OUTPATIENT)
Dept: CARDIOLOGY | Facility: HOSPITAL | Age: 66
Discharge: HOME | End: 2021-09-22
Attending: INTERNAL MEDICINE
Payer: MEDICARE

## 2021-09-22 ENCOUNTER — ANTI-COAG VISIT (OUTPATIENT)
Dept: CARDIOLOGY | Facility: CLINIC | Age: 66
End: 2021-09-22
Payer: MEDICARE

## 2021-09-22 ENCOUNTER — OFFICE VISIT (OUTPATIENT)
Dept: TRANSPLANT | Facility: CLINIC | Age: 66
End: 2021-09-22
Payer: MEDICARE

## 2021-09-22 ENCOUNTER — CLINICAL SUPPORT (OUTPATIENT)
Dept: TRANSPLANT | Facility: CLINIC | Age: 66
End: 2021-09-22
Payer: MEDICARE

## 2021-09-22 VITALS
BODY MASS INDEX: 23.44 KG/M2 | WEIGHT: 182.63 LBS | HEIGHT: 74 IN | TEMPERATURE: 97 F | HEART RATE: 86 BPM | SYSTOLIC BLOOD PRESSURE: 98 MMHG

## 2021-09-22 VITALS — SYSTOLIC BLOOD PRESSURE: 98 MMHG | HEIGHT: 74 IN | BODY MASS INDEX: 23.49 KG/M2 | HEART RATE: 70 BPM | WEIGHT: 183 LBS

## 2021-09-22 DIAGNOSIS — Z95.811 HEART REPLACED BY HEART ASSIST DEVICE: ICD-10-CM

## 2021-09-22 DIAGNOSIS — Z79.01 ANTICOAGULATED: ICD-10-CM

## 2021-09-22 DIAGNOSIS — I42.8 NICM (NONISCHEMIC CARDIOMYOPATHY): ICD-10-CM

## 2021-09-22 DIAGNOSIS — Z95.810 AICD (AUTOMATIC CARDIOVERTER/DEFIBRILLATOR) PRESENT: Primary | ICD-10-CM

## 2021-09-22 DIAGNOSIS — E11.9 TYPE 2 DIABETES MELLITUS WITHOUT COMPLICATION, WITHOUT LONG-TERM CURRENT USE OF INSULIN: ICD-10-CM

## 2021-09-22 DIAGNOSIS — Z95.811 LVAD (LEFT VENTRICULAR ASSIST DEVICE) PRESENT: ICD-10-CM

## 2021-09-22 DIAGNOSIS — I50.20 SYSTOLIC HEART FAILURE, UNSPECIFIED HF CHRONICITY: ICD-10-CM

## 2021-09-22 DIAGNOSIS — I10 ESSENTIAL HYPERTENSION: ICD-10-CM

## 2021-09-22 DIAGNOSIS — Z95.811 LVAD (LEFT VENTRICULAR ASSIST DEVICE) PRESENT: Primary | ICD-10-CM

## 2021-09-22 LAB
ASCENDING AORTA: 4 CM
BSA FOR ECHO PROCEDURE: 2.08 M2
CV ECHO LV RWT: 0.24 CM
DOP CALC LVOT AREA: 5.1 CM2
DOP CALC LVOT DIAMETER: 2.56 CM
E WAVE DECELERATION TIME: 183.49 MSEC
E/A RATIO: 2.35
E/E' RATIO: 8.59 M/S
ECHO LV POSTERIOR WALL: 0.79 CM (ref 0.6–1.1)
EJECTION FRACTION: 25 %
FRACTIONAL SHORTENING: 10 % (ref 28–44)
INTERVENTRICULAR SEPTUM: 0.58 CM (ref 0.6–1.1)
LA MAJOR: 4.62 CM
LA MINOR: 4.85 CM
LA WIDTH: 5.48 CM
LEFT ATRIUM SIZE: 3.68 CM
LEFT ATRIUM VOLUME INDEX MOD: 49.7 ML/M2
LEFT ATRIUM VOLUME INDEX: 38.8 ML/M2
LEFT ATRIUM VOLUME MOD: 103.92 CM3
LEFT ATRIUM VOLUME: 81.12 CM3
LEFT INTERNAL DIMENSION IN SYSTOLE: 6 CM (ref 2.1–4)
LEFT VENTRICLE DIASTOLIC VOLUME INDEX: 109.04 ML/M2
LEFT VENTRICLE DIASTOLIC VOLUME: 227.89 ML
LEFT VENTRICLE MASS INDEX: 89 G/M2
LEFT VENTRICLE SYSTOLIC VOLUME INDEX: 86.3 ML/M2
LEFT VENTRICLE SYSTOLIC VOLUME: 180.31 ML
LEFT VENTRICULAR INTERNAL DIMENSION IN DIASTOLE: 6.66 CM (ref 3.5–6)
LEFT VENTRICULAR MASS: 185.61 G
LV LATERAL E/E' RATIO: 6.64 M/S
LV SEPTAL E/E' RATIO: 12.17 M/S
MV PEAK A VEL: 0.31 M/S
MV PEAK E VEL: 0.73 M/S
MV STENOSIS PRESSURE HALF TIME: 53.21 MS
MV VALVE AREA P 1/2 METHOD: 4.13 CM2
PISA TR MAX VEL: 2.65 M/S
RA MAJOR: 4.91 CM
RA PRESSURE: 8 MMHG
RA WIDTH: 4.83 CM
RIGHT VENTRICULAR END-DIASTOLIC DIMENSION: 5.24 CM
RV TISSUE DOPPLER FREE WALL SYSTOLIC VELOCITY 1 (APICAL 4 CHAMBER VIEW): 7.37 CM/S
SINUS: 3.95 CM
STJ: 3.5 CM
TDI LATERAL: 0.11 M/S
TDI SEPTAL: 0.06 M/S
TDI: 0.09 M/S
TR MAX PG: 28 MMHG
TRICUSPID ANNULAR PLANE SYSTOLIC EXCURSION: 1.12 CM
TV REST PULMONARY ARTERY PRESSURE: 36 MMHG

## 2021-09-22 PROCEDURE — 4010F ACE/ARB THERAPY RXD/TAKEN: CPT | Mod: CPTII,S$GLB,, | Performed by: INTERNAL MEDICINE

## 2021-09-22 PROCEDURE — 93306 TTE W/DOPPLER COMPLETE: CPT | Mod: 26,,, | Performed by: INTERNAL MEDICINE

## 2021-09-22 PROCEDURE — 1157F ADVNC CARE PLAN IN RCRD: CPT | Mod: CPTII,S$GLB,, | Performed by: INTERNAL MEDICINE

## 2021-09-22 PROCEDURE — 93306 TTE W/DOPPLER COMPLETE: CPT

## 2021-09-22 PROCEDURE — 1126F AMNT PAIN NOTED NONE PRSNT: CPT | Mod: CPTII,S$GLB,, | Performed by: INTERNAL MEDICINE

## 2021-09-22 PROCEDURE — 99999 PR PBB SHADOW E&M-EST. PATIENT-LVL III: ICD-10-PCS | Mod: PBBFAC,,, | Performed by: INTERNAL MEDICINE

## 2021-09-22 PROCEDURE — 99214 OFFICE O/P EST MOD 30 MIN: CPT | Mod: S$GLB,,, | Performed by: INTERNAL MEDICINE

## 2021-09-22 PROCEDURE — 3008F PR BODY MASS INDEX (BMI) DOCUMENTED: ICD-10-PCS | Mod: CPTII,S$GLB,, | Performed by: INTERNAL MEDICINE

## 2021-09-22 PROCEDURE — 3288F FALL RISK ASSESSMENT DOCD: CPT | Mod: CPTII,S$GLB,, | Performed by: INTERNAL MEDICINE

## 2021-09-22 PROCEDURE — 99214 PR OFFICE/OUTPT VISIT, EST, LEVL IV, 30-39 MIN: ICD-10-PCS | Mod: S$GLB,,, | Performed by: INTERNAL MEDICINE

## 2021-09-22 PROCEDURE — 99999 PR PBB SHADOW E&M-EST. PATIENT-LVL III: CPT | Mod: PBBFAC,,, | Performed by: INTERNAL MEDICINE

## 2021-09-22 PROCEDURE — 4010F PR ACE/ARB THEARPY RXD/TAKEN: ICD-10-PCS | Mod: CPTII,S$GLB,, | Performed by: INTERNAL MEDICINE

## 2021-09-22 PROCEDURE — 3288F PR FALLS RISK ASSESSMENT DOCUMENTED: ICD-10-PCS | Mod: CPTII,S$GLB,, | Performed by: INTERNAL MEDICINE

## 2021-09-22 PROCEDURE — 1101F PT FALLS ASSESS-DOCD LE1/YR: CPT | Mod: CPTII,S$GLB,, | Performed by: INTERNAL MEDICINE

## 2021-09-22 PROCEDURE — 1101F PR PT FALLS ASSESS DOC 0-1 FALLS W/OUT INJ PAST YR: ICD-10-PCS | Mod: CPTII,S$GLB,, | Performed by: INTERNAL MEDICINE

## 2021-09-22 PROCEDURE — 3044F PR MOST RECENT HEMOGLOBIN A1C LEVEL <7.0%: ICD-10-PCS | Mod: CPTII,S$GLB,, | Performed by: INTERNAL MEDICINE

## 2021-09-22 PROCEDURE — 1126F PR PAIN SEVERITY QUANTIFIED, NO PAIN PRESENT: ICD-10-PCS | Mod: CPTII,S$GLB,, | Performed by: INTERNAL MEDICINE

## 2021-09-22 PROCEDURE — 93306 ECHO (CUPID ONLY): ICD-10-PCS | Mod: 26,,, | Performed by: INTERNAL MEDICINE

## 2021-09-22 PROCEDURE — 3044F HG A1C LEVEL LT 7.0%: CPT | Mod: CPTII,S$GLB,, | Performed by: INTERNAL MEDICINE

## 2021-09-22 PROCEDURE — 1157F PR ADVANCE CARE PLAN OR EQUIV PRESENT IN MEDICAL RECORD: ICD-10-PCS | Mod: CPTII,S$GLB,, | Performed by: INTERNAL MEDICINE

## 2021-09-22 PROCEDURE — 3008F BODY MASS INDEX DOCD: CPT | Mod: CPTII,S$GLB,, | Performed by: INTERNAL MEDICINE

## 2021-09-22 RX ORDER — LISINOPRIL 10 MG/1
TABLET ORAL
Qty: 180 TABLET | Refills: 5 | Status: SHIPPED | OUTPATIENT
Start: 2021-09-22 | End: 2021-11-03

## 2021-09-29 ENCOUNTER — ANTI-COAG VISIT (OUTPATIENT)
Dept: CARDIOLOGY | Facility: CLINIC | Age: 66
End: 2021-09-29
Payer: MEDICARE

## 2021-09-29 DIAGNOSIS — Z95.811 LVAD (LEFT VENTRICULAR ASSIST DEVICE) PRESENT: Primary | ICD-10-CM

## 2021-09-29 LAB — INR PPP: 3

## 2021-09-29 PROCEDURE — 93793 ANTICOAG MGMT PT WARFARIN: CPT | Mod: S$GLB,,,

## 2021-09-29 PROCEDURE — 93793 PR ANTICOAGULANT MGMT FOR PT TAKING WARFARIN: ICD-10-PCS | Mod: S$GLB,,,

## 2021-10-03 PROCEDURE — G0179 MD RECERTIFICATION HHA PT: HCPCS | Mod: ,,, | Performed by: INTERNAL MEDICINE

## 2021-10-03 PROCEDURE — G0179 PR HOME HEALTH MD RECERTIFICATION: ICD-10-PCS | Mod: ,,, | Performed by: INTERNAL MEDICINE

## 2021-10-04 ENCOUNTER — ANTI-COAG VISIT (OUTPATIENT)
Dept: CARDIOLOGY | Facility: CLINIC | Age: 66
End: 2021-10-04
Payer: MEDICARE

## 2021-10-04 DIAGNOSIS — Z95.811 LVAD (LEFT VENTRICULAR ASSIST DEVICE) PRESENT: Primary | ICD-10-CM

## 2021-10-04 LAB — INR PPP: 1.9

## 2021-10-04 PROCEDURE — 93793 PR ANTICOAGULANT MGMT FOR PT TAKING WARFARIN: ICD-10-PCS | Mod: S$GLB,,,

## 2021-10-04 PROCEDURE — 93793 ANTICOAG MGMT PT WARFARIN: CPT | Mod: S$GLB,,,

## 2021-10-12 ENCOUNTER — ANTI-COAG VISIT (OUTPATIENT)
Dept: CARDIOLOGY | Facility: CLINIC | Age: 66
End: 2021-10-12
Payer: MEDICARE

## 2021-10-12 DIAGNOSIS — Z95.811 LVAD (LEFT VENTRICULAR ASSIST DEVICE) PRESENT: Primary | ICD-10-CM

## 2021-10-12 LAB — INR PPP: 2

## 2021-10-12 PROCEDURE — 93793 PR ANTICOAGULANT MGMT FOR PT TAKING WARFARIN: ICD-10-PCS | Mod: S$GLB,,,

## 2021-10-12 PROCEDURE — 93793 ANTICOAG MGMT PT WARFARIN: CPT | Mod: S$GLB,,,

## 2021-10-18 ENCOUNTER — ANTI-COAG VISIT (OUTPATIENT)
Dept: CARDIOLOGY | Facility: CLINIC | Age: 66
End: 2021-10-18
Payer: MEDICARE

## 2021-10-18 ENCOUNTER — PATIENT MESSAGE (OUTPATIENT)
Dept: TRANSPLANT | Facility: CLINIC | Age: 66
End: 2021-10-18
Payer: MEDICARE

## 2021-10-18 DIAGNOSIS — Z95.811 LVAD (LEFT VENTRICULAR ASSIST DEVICE) PRESENT: Primary | ICD-10-CM

## 2021-10-18 LAB — INR PPP: 1.7

## 2021-10-18 PROCEDURE — 93793 PR ANTICOAGULANT MGMT FOR PT TAKING WARFARIN: ICD-10-PCS | Mod: S$GLB,,,

## 2021-10-18 PROCEDURE — 93793 ANTICOAG MGMT PT WARFARIN: CPT | Mod: S$GLB,,,

## 2021-10-25 ENCOUNTER — ANTI-COAG VISIT (OUTPATIENT)
Dept: CARDIOLOGY | Facility: CLINIC | Age: 66
End: 2021-10-25
Payer: MEDICARE

## 2021-10-25 DIAGNOSIS — Z95.811 LVAD (LEFT VENTRICULAR ASSIST DEVICE) PRESENT: Primary | ICD-10-CM

## 2021-10-25 LAB — INR PPP: 2.5

## 2021-10-25 PROCEDURE — 93793 PR ANTICOAGULANT MGMT FOR PT TAKING WARFARIN: ICD-10-PCS | Mod: S$GLB,,,

## 2021-10-25 PROCEDURE — 93793 ANTICOAG MGMT PT WARFARIN: CPT | Mod: S$GLB,,,

## 2021-11-01 ENCOUNTER — ANTI-COAG VISIT (OUTPATIENT)
Dept: CARDIOLOGY | Facility: CLINIC | Age: 66
End: 2021-11-01
Payer: MEDICARE

## 2021-11-01 DIAGNOSIS — Z95.811 LVAD (LEFT VENTRICULAR ASSIST DEVICE) PRESENT: Primary | ICD-10-CM

## 2021-11-01 LAB — INR PPP: 2.1

## 2021-11-01 PROCEDURE — 93793 ANTICOAG MGMT PT WARFARIN: CPT | Mod: S$GLB,,,

## 2021-11-01 PROCEDURE — 93793 PR ANTICOAGULANT MGMT FOR PT TAKING WARFARIN: ICD-10-PCS | Mod: S$GLB,,,

## 2021-11-03 ENCOUNTER — OFFICE VISIT (OUTPATIENT)
Dept: TRANSPLANT | Facility: CLINIC | Age: 66
End: 2021-11-03
Payer: MEDICARE

## 2021-11-03 ENCOUNTER — ANTI-COAG VISIT (OUTPATIENT)
Dept: CARDIOLOGY | Facility: CLINIC | Age: 66
End: 2021-11-03
Payer: MEDICARE

## 2021-11-03 ENCOUNTER — CLINICAL SUPPORT (OUTPATIENT)
Dept: TRANSPLANT | Facility: CLINIC | Age: 66
End: 2021-11-03
Payer: MEDICARE

## 2021-11-03 ENCOUNTER — LAB VISIT (OUTPATIENT)
Dept: LAB | Facility: HOSPITAL | Age: 66
End: 2021-11-03
Attending: INTERNAL MEDICINE
Payer: MEDICARE

## 2021-11-03 VITALS — TEMPERATURE: 98 F | BODY MASS INDEX: 22.59 KG/M2 | WEIGHT: 176 LBS | HEIGHT: 74 IN | SYSTOLIC BLOOD PRESSURE: 100 MMHG

## 2021-11-03 DIAGNOSIS — I10 ESSENTIAL HYPERTENSION: ICD-10-CM

## 2021-11-03 DIAGNOSIS — I25.10 CORONARY ARTERY DISEASE INVOLVING NATIVE CORONARY ARTERY OF NATIVE HEART WITHOUT ANGINA PECTORIS: ICD-10-CM

## 2021-11-03 DIAGNOSIS — I50.20 SYSTOLIC CONGESTIVE HEART FAILURE, UNSPECIFIED HF CHRONICITY: ICD-10-CM

## 2021-11-03 DIAGNOSIS — Z95.810 AICD (AUTOMATIC CARDIOVERTER/DEFIBRILLATOR) PRESENT: Primary | ICD-10-CM

## 2021-11-03 DIAGNOSIS — Z79.01 ANTICOAGULATED: ICD-10-CM

## 2021-11-03 DIAGNOSIS — I42.8 NICM (NONISCHEMIC CARDIOMYOPATHY): ICD-10-CM

## 2021-11-03 DIAGNOSIS — Z95.811 LVAD (LEFT VENTRICULAR ASSIST DEVICE) PRESENT: ICD-10-CM

## 2021-11-03 DIAGNOSIS — E11.9 TYPE 2 DIABETES MELLITUS WITHOUT COMPLICATION, WITHOUT LONG-TERM CURRENT USE OF INSULIN: ICD-10-CM

## 2021-11-03 DIAGNOSIS — E78.2 MIXED HYPERLIPIDEMIA: ICD-10-CM

## 2021-11-03 DIAGNOSIS — Z95.811 HEART REPLACED BY HEART ASSIST DEVICE: ICD-10-CM

## 2021-11-03 DIAGNOSIS — Z95.811 LVAD (LEFT VENTRICULAR ASSIST DEVICE) PRESENT: Primary | ICD-10-CM

## 2021-11-03 DIAGNOSIS — M25.562 ACUTE PAIN OF LEFT KNEE: ICD-10-CM

## 2021-11-03 LAB
ALBUMIN SERPL BCP-MCNC: 3.2 G/DL (ref 3.5–5.2)
ALP SERPL-CCNC: 135 U/L (ref 55–135)
ALT SERPL W/O P-5'-P-CCNC: 6 U/L (ref 10–44)
ANION GAP SERPL CALC-SCNC: 10 MMOL/L (ref 8–16)
AST SERPL-CCNC: 18 U/L (ref 10–40)
BASOPHILS # BLD AUTO: 0.03 K/UL (ref 0–0.2)
BASOPHILS NFR BLD: 0.3 % (ref 0–1.9)
BILIRUB DIRECT SERPL-MCNC: 0.7 MG/DL (ref 0.1–0.3)
BILIRUB SERPL-MCNC: 1.7 MG/DL (ref 0.1–1)
BNP SERPL-MCNC: 1180 PG/ML (ref 0–99)
BUN SERPL-MCNC: 21 MG/DL (ref 8–23)
CALCIUM SERPL-MCNC: 9.7 MG/DL (ref 8.7–10.5)
CHLORIDE SERPL-SCNC: 103 MMOL/L (ref 95–110)
CO2 SERPL-SCNC: 25 MMOL/L (ref 23–29)
CREAT SERPL-MCNC: 1 MG/DL (ref 0.5–1.4)
CRP SERPL-MCNC: 20.4 MG/L (ref 0–8.2)
DIFFERENTIAL METHOD: ABNORMAL
EOSINOPHIL # BLD AUTO: 0.1 K/UL (ref 0–0.5)
EOSINOPHIL NFR BLD: 1.3 % (ref 0–8)
ERYTHROCYTE [DISTWIDTH] IN BLOOD BY AUTOMATED COUNT: 17.3 % (ref 11.5–14.5)
EST. GFR  (AFRICAN AMERICAN): >60 ML/MIN/1.73 M^2
EST. GFR  (NON AFRICAN AMERICAN): >60 ML/MIN/1.73 M^2
GLUCOSE SERPL-MCNC: 164 MG/DL (ref 70–110)
HCT VFR BLD AUTO: 38.1 % (ref 40–54)
HGB BLD-MCNC: 11.5 G/DL (ref 14–18)
IMM GRANULOCYTES # BLD AUTO: 0.03 K/UL (ref 0–0.04)
IMM GRANULOCYTES NFR BLD AUTO: 0.3 % (ref 0–0.5)
INR PPP: 1.9 (ref 0.8–1.2)
LDH SERPL L TO P-CCNC: 206 U/L (ref 110–260)
LYMPHOCYTES # BLD AUTO: 1.7 K/UL (ref 1–4.8)
LYMPHOCYTES NFR BLD: 17.7 % (ref 18–48)
MAGNESIUM SERPL-MCNC: 1.9 MG/DL (ref 1.6–2.6)
MCH RBC QN AUTO: 27.4 PG (ref 27–31)
MCHC RBC AUTO-ENTMCNC: 30.2 G/DL (ref 32–36)
MCV RBC AUTO: 91 FL (ref 82–98)
MONOCYTES # BLD AUTO: 0.7 K/UL (ref 0.3–1)
MONOCYTES NFR BLD: 7.1 % (ref 4–15)
NEUTROPHILS # BLD AUTO: 7.2 K/UL (ref 1.8–7.7)
NEUTROPHILS NFR BLD: 73.3 % (ref 38–73)
NRBC BLD-RTO: 0 /100 WBC
PHOSPHATE SERPL-MCNC: 2.9 MG/DL (ref 2.7–4.5)
PLATELET # BLD AUTO: 226 K/UL (ref 150–450)
PMV BLD AUTO: 10.2 FL (ref 9.2–12.9)
POTASSIUM SERPL-SCNC: 4.5 MMOL/L (ref 3.5–5.1)
PREALB SERPL-MCNC: 13 MG/DL (ref 20–43)
PROT SERPL-MCNC: 9 G/DL (ref 6–8.4)
PROTHROMBIN TIME: 20.4 SEC (ref 9–12.5)
RBC # BLD AUTO: 4.19 M/UL (ref 4.6–6.2)
SODIUM SERPL-SCNC: 138 MMOL/L (ref 136–145)
WBC # BLD AUTO: 9.76 K/UL (ref 3.9–12.7)

## 2021-11-03 PROCEDURE — 99999 PR PBB SHADOW E&M-EST. PATIENT-LVL IV: ICD-10-PCS | Mod: PBBFAC,,, | Performed by: INTERNAL MEDICINE

## 2021-11-03 PROCEDURE — 83880 ASSAY OF NATRIURETIC PEPTIDE: CPT | Performed by: INTERNAL MEDICINE

## 2021-11-03 PROCEDURE — 84100 ASSAY OF PHOSPHORUS: CPT | Performed by: INTERNAL MEDICINE

## 2021-11-03 PROCEDURE — 36415 COLL VENOUS BLD VENIPUNCTURE: CPT | Performed by: INTERNAL MEDICINE

## 2021-11-03 PROCEDURE — 84134 ASSAY OF PREALBUMIN: CPT | Performed by: INTERNAL MEDICINE

## 2021-11-03 PROCEDURE — 83735 ASSAY OF MAGNESIUM: CPT | Performed by: INTERNAL MEDICINE

## 2021-11-03 PROCEDURE — 85025 COMPLETE CBC W/AUTO DIFF WBC: CPT | Performed by: INTERNAL MEDICINE

## 2021-11-03 PROCEDURE — 99214 PR OFFICE/OUTPT VISIT, EST, LEVL IV, 30-39 MIN: ICD-10-PCS | Mod: S$GLB,,, | Performed by: INTERNAL MEDICINE

## 2021-11-03 PROCEDURE — 83615 LACTATE (LD) (LDH) ENZYME: CPT | Performed by: INTERNAL MEDICINE

## 2021-11-03 PROCEDURE — 82248 BILIRUBIN DIRECT: CPT | Performed by: INTERNAL MEDICINE

## 2021-11-03 PROCEDURE — 85610 PROTHROMBIN TIME: CPT | Performed by: INTERNAL MEDICINE

## 2021-11-03 PROCEDURE — 80053 COMPREHEN METABOLIC PANEL: CPT | Performed by: INTERNAL MEDICINE

## 2021-11-03 PROCEDURE — 99999 PR PBB SHADOW E&M-EST. PATIENT-LVL IV: CPT | Mod: PBBFAC,,, | Performed by: INTERNAL MEDICINE

## 2021-11-03 PROCEDURE — 99214 OFFICE O/P EST MOD 30 MIN: CPT | Mod: S$GLB,,, | Performed by: INTERNAL MEDICINE

## 2021-11-03 PROCEDURE — 86140 C-REACTIVE PROTEIN: CPT | Performed by: INTERNAL MEDICINE

## 2021-11-03 RX ORDER — LISINOPRIL 20 MG/1
20 TABLET ORAL 2 TIMES DAILY
Qty: 90 TABLET | Refills: 2 | Status: SHIPPED | OUTPATIENT
Start: 2021-11-03 | End: 2022-07-13

## 2021-11-03 RX ORDER — TORSEMIDE 20 MG/1
40 TABLET ORAL DAILY
Qty: 180 TABLET | Refills: 0 | Status: ON HOLD | OUTPATIENT
Start: 2021-11-03 | End: 2022-02-17 | Stop reason: HOSPADM

## 2021-11-04 ENCOUNTER — TELEPHONE (OUTPATIENT)
Dept: TRANSPLANT | Facility: CLINIC | Age: 66
End: 2021-11-04
Payer: MEDICARE

## 2021-11-05 ENCOUNTER — TELEPHONE (OUTPATIENT)
Dept: TRANSPLANT | Facility: CLINIC | Age: 66
End: 2021-11-05
Payer: MEDICARE

## 2021-11-05 LAB
EXT ALBUMIN: 3
EXT ALT: 9
EXT AST: 13
EXT BILIRUBIN TOTAL: 1.2
EXT BNP: 454
EXT BUN: 15.92
EXT CALCIUM: 9.7
EXT CHLORIDE: 107
EXT CREATININE: 0.98 MG/DL
EXT GLUCOSE: 113
EXT POTASSIUM: 3.8
EXT PROTEIN TOTAL: 8.3
EXT SODIUM: 146 MMOL/L

## 2021-11-09 ENCOUNTER — ANTI-COAG VISIT (OUTPATIENT)
Dept: CARDIOLOGY | Facility: CLINIC | Age: 66
End: 2021-11-09
Payer: MEDICARE

## 2021-11-09 DIAGNOSIS — Z95.811 LVAD (LEFT VENTRICULAR ASSIST DEVICE) PRESENT: Primary | ICD-10-CM

## 2021-11-09 LAB — INR PPP: 2.4

## 2021-11-09 PROCEDURE — 93793 PR ANTICOAGULANT MGMT FOR PT TAKING WARFARIN: ICD-10-PCS | Mod: S$GLB,,,

## 2021-11-09 PROCEDURE — 93793 ANTICOAG MGMT PT WARFARIN: CPT | Mod: S$GLB,,,

## 2021-11-15 ENCOUNTER — ANTI-COAG VISIT (OUTPATIENT)
Dept: CARDIOLOGY | Facility: CLINIC | Age: 66
End: 2021-11-15
Payer: MEDICARE

## 2021-11-15 DIAGNOSIS — Z95.811 LVAD (LEFT VENTRICULAR ASSIST DEVICE) PRESENT: Primary | ICD-10-CM

## 2021-11-15 LAB — INR PPP: 2

## 2021-11-15 PROCEDURE — 93793 PR ANTICOAGULANT MGMT FOR PT TAKING WARFARIN: ICD-10-PCS | Mod: S$GLB,,,

## 2021-11-15 PROCEDURE — 93793 ANTICOAG MGMT PT WARFARIN: CPT | Mod: S$GLB,,,

## 2021-11-22 ENCOUNTER — ANTI-COAG VISIT (OUTPATIENT)
Dept: CARDIOLOGY | Facility: CLINIC | Age: 66
End: 2021-11-22
Payer: MEDICARE

## 2021-11-22 DIAGNOSIS — Z95.811 LVAD (LEFT VENTRICULAR ASSIST DEVICE) PRESENT: Primary | ICD-10-CM

## 2021-11-22 LAB — INR PPP: 2.1

## 2021-11-22 PROCEDURE — 93793 PR ANTICOAGULANT MGMT FOR PT TAKING WARFARIN: ICD-10-PCS | Mod: S$GLB,,,

## 2021-11-22 PROCEDURE — 93793 ANTICOAG MGMT PT WARFARIN: CPT | Mod: S$GLB,,,

## 2021-12-02 ENCOUNTER — ANTI-COAG VISIT (OUTPATIENT)
Dept: CARDIOLOGY | Facility: CLINIC | Age: 66
End: 2021-12-02
Payer: MEDICARE

## 2021-12-02 DIAGNOSIS — Z95.811 LVAD (LEFT VENTRICULAR ASSIST DEVICE) PRESENT: Primary | ICD-10-CM

## 2021-12-02 LAB — INR PPP: 3.1

## 2021-12-02 PROCEDURE — 93793 ANTICOAG MGMT PT WARFARIN: CPT | Mod: S$GLB,,,

## 2021-12-02 PROCEDURE — 93793 PR ANTICOAGULANT MGMT FOR PT TAKING WARFARIN: ICD-10-PCS | Mod: S$GLB,,,

## 2021-12-06 ENCOUNTER — EXTERNAL HOME HEALTH (OUTPATIENT)
Dept: HOME HEALTH SERVICES | Facility: HOSPITAL | Age: 66
End: 2021-12-06
Payer: MEDICARE

## 2021-12-07 ENCOUNTER — ANTI-COAG VISIT (OUTPATIENT)
Dept: CARDIOLOGY | Facility: CLINIC | Age: 66
End: 2021-12-07
Payer: MEDICARE

## 2021-12-07 DIAGNOSIS — Z95.811 LVAD (LEFT VENTRICULAR ASSIST DEVICE) PRESENT: Primary | ICD-10-CM

## 2021-12-07 LAB — INR PPP: 2.9

## 2021-12-07 PROCEDURE — 93793 ANTICOAG MGMT PT WARFARIN: CPT | Mod: S$GLB,,,

## 2021-12-07 PROCEDURE — 93793 PR ANTICOAGULANT MGMT FOR PT TAKING WARFARIN: ICD-10-PCS | Mod: S$GLB,,,

## 2021-12-14 ENCOUNTER — ANTI-COAG VISIT (OUTPATIENT)
Dept: CARDIOLOGY | Facility: CLINIC | Age: 66
End: 2021-12-14
Payer: MEDICARE

## 2021-12-14 DIAGNOSIS — Z95.811 LVAD (LEFT VENTRICULAR ASSIST DEVICE) PRESENT: Primary | ICD-10-CM

## 2021-12-14 LAB — INR PPP: 2.5

## 2021-12-14 PROCEDURE — 93793 ANTICOAG MGMT PT WARFARIN: CPT | Mod: S$GLB,,,

## 2021-12-14 PROCEDURE — 93793 PR ANTICOAGULANT MGMT FOR PT TAKING WARFARIN: ICD-10-PCS | Mod: S$GLB,,,

## 2021-12-21 ENCOUNTER — ANTI-COAG VISIT (OUTPATIENT)
Dept: CARDIOLOGY | Facility: CLINIC | Age: 66
End: 2021-12-21
Payer: MEDICARE

## 2021-12-21 DIAGNOSIS — Z95.811 LVAD (LEFT VENTRICULAR ASSIST DEVICE) PRESENT: Primary | ICD-10-CM

## 2021-12-21 LAB — INR PPP: 2.2

## 2021-12-21 PROCEDURE — 93793 ANTICOAG MGMT PT WARFARIN: CPT | Mod: S$GLB,,,

## 2021-12-21 PROCEDURE — 93793 PR ANTICOAGULANT MGMT FOR PT TAKING WARFARIN: ICD-10-PCS | Mod: S$GLB,,,

## 2021-12-23 ENCOUNTER — PATIENT MESSAGE (OUTPATIENT)
Dept: CARDIOTHORACIC SURGERY | Facility: CLINIC | Age: 66
End: 2021-12-23
Payer: MEDICARE

## 2021-12-29 ENCOUNTER — ANTI-COAG VISIT (OUTPATIENT)
Dept: CARDIOLOGY | Facility: CLINIC | Age: 66
End: 2021-12-29
Payer: MEDICARE

## 2021-12-29 DIAGNOSIS — Z95.811 LVAD (LEFT VENTRICULAR ASSIST DEVICE) PRESENT: Primary | ICD-10-CM

## 2021-12-29 LAB — INR PPP: 2.3

## 2021-12-29 PROCEDURE — 93793 PR ANTICOAGULANT MGMT FOR PT TAKING WARFARIN: ICD-10-PCS | Mod: S$GLB,,,

## 2021-12-29 PROCEDURE — 93793 ANTICOAG MGMT PT WARFARIN: CPT | Mod: S$GLB,,,

## 2022-01-05 ENCOUNTER — TELEPHONE (OUTPATIENT)
Dept: TRANSPLANT | Facility: CLINIC | Age: 67
End: 2022-01-05
Payer: MEDICARE

## 2022-01-05 NOTE — TELEPHONE ENCOUNTER
Spoke with patient's wife regarding equipment maintenance.  Asked wife to bring MPU equipment with them to next appt please. Patient's wife verbalized understanding and agreement.  It is medically necessary to ensure patient has properly functioning equipment and wearables to prevent infection, injury or death to patient.

## 2022-01-06 ENCOUNTER — OFFICE VISIT (OUTPATIENT)
Dept: TRANSPLANT | Facility: CLINIC | Age: 67
End: 2022-01-06
Attending: INTERNAL MEDICINE
Payer: MEDICARE

## 2022-01-06 ENCOUNTER — LAB VISIT (OUTPATIENT)
Dept: LAB | Facility: HOSPITAL | Age: 67
End: 2022-01-06
Attending: INTERNAL MEDICINE
Payer: MEDICARE

## 2022-01-06 ENCOUNTER — DOCUMENTATION ONLY (OUTPATIENT)
Dept: CARDIOTHORACIC SURGERY | Facility: CLINIC | Age: 67
End: 2022-01-06
Payer: MEDICARE

## 2022-01-06 ENCOUNTER — ANTI-COAG VISIT (OUTPATIENT)
Dept: CARDIOLOGY | Facility: CLINIC | Age: 67
End: 2022-01-06
Payer: MEDICARE

## 2022-01-06 ENCOUNTER — CLINICAL SUPPORT (OUTPATIENT)
Dept: TRANSPLANT | Facility: CLINIC | Age: 67
End: 2022-01-06
Payer: MEDICARE

## 2022-01-06 VITALS
TEMPERATURE: 98 F | BODY MASS INDEX: 23.1 KG/M2 | HEART RATE: 107 BPM | HEIGHT: 74 IN | WEIGHT: 180 LBS | SYSTOLIC BLOOD PRESSURE: 82 MMHG

## 2022-01-06 DIAGNOSIS — I42.8 NICM (NONISCHEMIC CARDIOMYOPATHY): ICD-10-CM

## 2022-01-06 DIAGNOSIS — Z95.811 HEART REPLACED BY HEART ASSIST DEVICE: ICD-10-CM

## 2022-01-06 DIAGNOSIS — E78.5 HYPERLIPIDEMIA ASSOCIATED WITH TYPE 2 DIABETES MELLITUS: ICD-10-CM

## 2022-01-06 DIAGNOSIS — Z95.811 HEART REPLACED BY HEART ASSIST DEVICE: Primary | ICD-10-CM

## 2022-01-06 DIAGNOSIS — E11.69 HYPERLIPIDEMIA ASSOCIATED WITH TYPE 2 DIABETES MELLITUS: ICD-10-CM

## 2022-01-06 DIAGNOSIS — E11.9 TYPE 2 DIABETES MELLITUS WITHOUT COMPLICATION, WITHOUT LONG-TERM CURRENT USE OF INSULIN: ICD-10-CM

## 2022-01-06 DIAGNOSIS — Z95.811 LVAD (LEFT VENTRICULAR ASSIST DEVICE) PRESENT: Primary | ICD-10-CM

## 2022-01-06 DIAGNOSIS — I10 ESSENTIAL HYPERTENSION: ICD-10-CM

## 2022-01-06 DIAGNOSIS — I50.42 CHRONIC COMBINED SYSTOLIC AND DIASTOLIC CONGESTIVE HEART FAILURE: ICD-10-CM

## 2022-01-06 PROBLEM — Z51.5 ENCOUNTER FOR PALLIATIVE CARE: Status: RESOLVED | Noted: 2021-01-11 | Resolved: 2022-01-06

## 2022-01-06 LAB
ALBUMIN SERPL BCP-MCNC: 3.1 G/DL (ref 3.5–5.2)
ALP SERPL-CCNC: 144 U/L (ref 55–135)
ALT SERPL W/O P-5'-P-CCNC: 6 U/L (ref 10–44)
ANION GAP SERPL CALC-SCNC: 7 MMOL/L (ref 8–16)
AST SERPL-CCNC: 13 U/L (ref 10–40)
BASOPHILS # BLD AUTO: 0.04 K/UL (ref 0–0.2)
BASOPHILS NFR BLD: 0.4 % (ref 0–1.9)
BILIRUB DIRECT SERPL-MCNC: 0.6 MG/DL (ref 0.1–0.3)
BILIRUB SERPL-MCNC: 1.4 MG/DL (ref 0.1–1)
BNP SERPL-MCNC: 338 PG/ML (ref 0–99)
BUN SERPL-MCNC: 18 MG/DL (ref 8–23)
CALCIUM SERPL-MCNC: 9.2 MG/DL (ref 8.7–10.5)
CHLORIDE SERPL-SCNC: 102 MMOL/L (ref 95–110)
CO2 SERPL-SCNC: 30 MMOL/L (ref 23–29)
CREAT SERPL-MCNC: 1.2 MG/DL (ref 0.5–1.4)
CRP SERPL-MCNC: 50.4 MG/L (ref 0–8.2)
DIFFERENTIAL METHOD: ABNORMAL
EOSINOPHIL # BLD AUTO: 0.2 K/UL (ref 0–0.5)
EOSINOPHIL NFR BLD: 2.2 % (ref 0–8)
ERYTHROCYTE [DISTWIDTH] IN BLOOD BY AUTOMATED COUNT: 15.1 % (ref 11.5–14.5)
EST. GFR  (AFRICAN AMERICAN): >60 ML/MIN/1.73 M^2
EST. GFR  (NON AFRICAN AMERICAN): >60 ML/MIN/1.73 M^2
GLUCOSE SERPL-MCNC: 125 MG/DL (ref 70–110)
HCT VFR BLD AUTO: 33.7 % (ref 40–54)
HGB BLD-MCNC: 10.3 G/DL (ref 14–18)
IMM GRANULOCYTES # BLD AUTO: 0.03 K/UL (ref 0–0.04)
IMM GRANULOCYTES NFR BLD AUTO: 0.3 % (ref 0–0.5)
INR PPP: 2.2 (ref 0.8–1.2)
LDH SERPL L TO P-CCNC: 171 U/L (ref 110–260)
LYMPHOCYTES # BLD AUTO: 2.2 K/UL (ref 1–4.8)
LYMPHOCYTES NFR BLD: 22.4 % (ref 18–48)
MAGNESIUM SERPL-MCNC: 1.9 MG/DL (ref 1.6–2.6)
MCH RBC QN AUTO: 28.5 PG (ref 27–31)
MCHC RBC AUTO-ENTMCNC: 30.6 G/DL (ref 32–36)
MCV RBC AUTO: 93 FL (ref 82–98)
MONOCYTES # BLD AUTO: 0.9 K/UL (ref 0.3–1)
MONOCYTES NFR BLD: 9.3 % (ref 4–15)
NEUTROPHILS # BLD AUTO: 6.5 K/UL (ref 1.8–7.7)
NEUTROPHILS NFR BLD: 65.4 % (ref 38–73)
NRBC BLD-RTO: 0 /100 WBC
PHOSPHATE SERPL-MCNC: 2.9 MG/DL (ref 2.7–4.5)
PLATELET # BLD AUTO: 207 K/UL (ref 150–450)
PMV BLD AUTO: 10.3 FL (ref 9.2–12.9)
POTASSIUM SERPL-SCNC: 4 MMOL/L (ref 3.5–5.1)
PREALB SERPL-MCNC: 11 MG/DL (ref 20–43)
PROT SERPL-MCNC: 8.5 G/DL (ref 6–8.4)
PROTHROMBIN TIME: 22.5 SEC (ref 9–12.5)
RBC # BLD AUTO: 3.62 M/UL (ref 4.6–6.2)
SODIUM SERPL-SCNC: 139 MMOL/L (ref 136–145)
WBC # BLD AUTO: 9.91 K/UL (ref 3.9–12.7)

## 2022-01-06 PROCEDURE — 85025 COMPLETE CBC W/AUTO DIFF WBC: CPT | Performed by: INTERNAL MEDICINE

## 2022-01-06 PROCEDURE — 85610 PROTHROMBIN TIME: CPT | Performed by: INTERNAL MEDICINE

## 2022-01-06 PROCEDURE — 99999 PR PBB SHADOW E&M-EST. PATIENT-LVL IV: ICD-10-PCS | Mod: PBBFAC,,, | Performed by: INTERNAL MEDICINE

## 2022-01-06 PROCEDURE — 93793 PR ANTICOAGULANT MGMT FOR PT TAKING WARFARIN: ICD-10-PCS | Mod: S$GLB,,,

## 2022-01-06 PROCEDURE — 93793 ANTICOAG MGMT PT WARFARIN: CPT | Mod: S$GLB,,,

## 2022-01-06 PROCEDURE — 99999 PR PBB SHADOW E&M-EST. PATIENT-LVL IV: CPT | Mod: PBBFAC,,, | Performed by: INTERNAL MEDICINE

## 2022-01-06 PROCEDURE — 1159F MED LIST DOCD IN RCRD: CPT | Mod: CPTII,S$GLB,, | Performed by: INTERNAL MEDICINE

## 2022-01-06 PROCEDURE — 99214 OFFICE O/P EST MOD 30 MIN: CPT | Mod: S$GLB,,, | Performed by: INTERNAL MEDICINE

## 2022-01-06 PROCEDURE — 1157F PR ADVANCE CARE PLAN OR EQUIV PRESENT IN MEDICAL RECORD: ICD-10-PCS | Mod: CPTII,S$GLB,, | Performed by: INTERNAL MEDICINE

## 2022-01-06 PROCEDURE — 82248 BILIRUBIN DIRECT: CPT | Performed by: INTERNAL MEDICINE

## 2022-01-06 PROCEDURE — 93750 OP LVAD INTERROGATION: ICD-10-PCS | Mod: S$GLB,,, | Performed by: INTERNAL MEDICINE

## 2022-01-06 PROCEDURE — 3288F PR FALLS RISK ASSESSMENT DOCUMENTED: ICD-10-PCS | Mod: CPTII,S$GLB,, | Performed by: INTERNAL MEDICINE

## 2022-01-06 PROCEDURE — 1101F PT FALLS ASSESS-DOCD LE1/YR: CPT | Mod: CPTII,S$GLB,, | Performed by: INTERNAL MEDICINE

## 2022-01-06 PROCEDURE — 1160F RVW MEDS BY RX/DR IN RCRD: CPT | Mod: CPTII,S$GLB,, | Performed by: INTERNAL MEDICINE

## 2022-01-06 PROCEDURE — 80053 COMPREHEN METABOLIC PANEL: CPT | Performed by: INTERNAL MEDICINE

## 2022-01-06 PROCEDURE — 84134 ASSAY OF PREALBUMIN: CPT | Performed by: INTERNAL MEDICINE

## 2022-01-06 PROCEDURE — 93750 INTERROGATION VAD IN PERSON: CPT | Mod: S$GLB,,, | Performed by: INTERNAL MEDICINE

## 2022-01-06 PROCEDURE — 1101F PR PT FALLS ASSESS DOC 0-1 FALLS W/OUT INJ PAST YR: ICD-10-PCS | Mod: CPTII,S$GLB,, | Performed by: INTERNAL MEDICINE

## 2022-01-06 PROCEDURE — 1160F PR REVIEW ALL MEDS BY PRESCRIBER/CLIN PHARMACIST DOCUMENTED: ICD-10-PCS | Mod: CPTII,S$GLB,, | Performed by: INTERNAL MEDICINE

## 2022-01-06 PROCEDURE — 84100 ASSAY OF PHOSPHORUS: CPT | Performed by: INTERNAL MEDICINE

## 2022-01-06 PROCEDURE — 1159F PR MEDICATION LIST DOCUMENTED IN MEDICAL RECORD: ICD-10-PCS | Mod: CPTII,S$GLB,, | Performed by: INTERNAL MEDICINE

## 2022-01-06 PROCEDURE — 3008F BODY MASS INDEX DOCD: CPT | Mod: CPTII,S$GLB,, | Performed by: INTERNAL MEDICINE

## 2022-01-06 PROCEDURE — 83735 ASSAY OF MAGNESIUM: CPT | Performed by: INTERNAL MEDICINE

## 2022-01-06 PROCEDURE — 1157F ADVNC CARE PLAN IN RCRD: CPT | Mod: CPTII,S$GLB,, | Performed by: INTERNAL MEDICINE

## 2022-01-06 PROCEDURE — 93750 PR INTERROGATE VENT ASSIST DEV, IN PERSON, W PHYSICIAN ANALYSIS: ICD-10-PCS | Mod: S$GLB,,, | Performed by: INTERNAL MEDICINE

## 2022-01-06 PROCEDURE — 83615 LACTATE (LD) (LDH) ENZYME: CPT | Performed by: INTERNAL MEDICINE

## 2022-01-06 PROCEDURE — 1126F PR PAIN SEVERITY QUANTIFIED, NO PAIN PRESENT: ICD-10-PCS | Mod: CPTII,S$GLB,, | Performed by: INTERNAL MEDICINE

## 2022-01-06 PROCEDURE — 99214 PR OFFICE/OUTPT VISIT, EST, LEVL IV, 30-39 MIN: ICD-10-PCS | Mod: S$GLB,,, | Performed by: INTERNAL MEDICINE

## 2022-01-06 PROCEDURE — 83880 ASSAY OF NATRIURETIC PEPTIDE: CPT | Performed by: INTERNAL MEDICINE

## 2022-01-06 PROCEDURE — 3288F FALL RISK ASSESSMENT DOCD: CPT | Mod: CPTII,S$GLB,, | Performed by: INTERNAL MEDICINE

## 2022-01-06 PROCEDURE — 1126F AMNT PAIN NOTED NONE PRSNT: CPT | Mod: CPTII,S$GLB,, | Performed by: INTERNAL MEDICINE

## 2022-01-06 PROCEDURE — 86140 C-REACTIVE PROTEIN: CPT | Performed by: INTERNAL MEDICINE

## 2022-01-06 PROCEDURE — 3008F PR BODY MASS INDEX (BMI) DOCUMENTED: ICD-10-PCS | Mod: CPTII,S$GLB,, | Performed by: INTERNAL MEDICINE

## 2022-01-06 PROCEDURE — 36415 COLL VENOUS BLD VENIPUNCTURE: CPT | Performed by: INTERNAL MEDICINE

## 2022-01-06 NOTE — Clinical Note
Stop home health Please give him lab forms for [facility] for INR to be done weekly on Mondays and also as needed

## 2022-01-06 NOTE — Clinical Note
Please give him lab forms for [facility] for INR to be done weekly on Mondays.  I thought he could walk over to coumadin clinic but Ania tells me you all no longer have support on 3.  If that is incorrect please instant message me via Epic so I can let office know

## 2022-01-06 NOTE — PROGRESS NOTES
Preventative maintenance performed on patient's U 71559. This is medically necessary for the proper function of the LVAD system.     Patient was educated on equipment cleaning and basic LVAD maintenance. Patient agreed and verbalized understanding.

## 2022-01-06 NOTE — PROGRESS NOTES
Pt presented for 12 month follow-up and verbalized consent and willingness to continue to participate with the INTERMACS registry.      Patient completed the EQ-5D quality of life questionnaire, KCCQ, and the Trail [employer] neurocognitive test in 109 seconds.

## 2022-01-06 NOTE — PROCEDURES
TXP JACEY INTERROGATIONS 1/6/2022 11/3/2021 9/22/2021 7/21/2021 6/10/2021 5/6/2021 4/8/2021   Type HeartMate3 HeartMate3 HeartMate3 HeartMate3 HeartMate3 HeartMate3 HeartMate3   Flow 4.0 3.5 3.8 3.5 3.8 4.0 3.7   Speed 5000 5000 5000 5000 5000 5000 5000   PI 4.9 7.6 5.9 8.0 6.8 5.2 9.2   Power (Edwards) 3.4 3.5 3.4 3.6 3.5 3.5 3.6   LSL 4600 4600 4600 4600 5000 4600 4600   Pulsatility Intermittent pulse No Pulse Pulse Intermittent pulse Pulse Pulse Pulse   }Interrogation of Ventricular assist device was performed with physician analysis of device parameters and review of device function. I have personally reviewed the interrogation findings and agree with findings as stated.

## 2022-01-06 NOTE — PROGRESS NOTES
Subjective:   Patient ID:  Rocco France is a 66 y.o. male who presents for LVAD followup visit.    Implant Date: 1/13/2021  Initials: [facility]     Heartmate 3 RPM 5000     INR goal: 2-3   Bridge with Heparin   Antiplatelets: ASA 81     TXP JACEY INTERROGATIONS 1/6/2022   Type HeartMate3   Flow 4.0   Speed 5000   PI 4.9   Power (Edwards) 3.4   LSL 4600   Pulsatility Intermittent pulse   Interrogation of Ventricular assist device was performed with physician analysis of device parameters and review of device function. I have personally reviewed the interrogation findings and agree with findings as stated.     HPI  67 yo BM with stage D CHF due to NICMP s/p HM3, s/p ICD, HTN, HLD, T2DM returns for routine LVAD visit.  He has some dyspnea if bends down and if carrying case of water.  He does all shopping for family, always on go but does not exercise.  Sleeps in recliner for general comfort, not breathing and done so for > 20 yrs.  No palpitations.    Review of Systems   Constitutional: Negative for chills, fever, malaise/fatigue, night sweats, weight gain and weight loss.   HENT: Negative for congestion, hearing loss, hoarse voice, nosebleeds and sore throat.    Eyes: Negative for double vision, pain, vision loss in left eye and vision loss in right eye.   Cardiovascular: Positive for dyspnea on exertion (some dyspnea if bends down and if carrying case of water) and leg swelling (rarely but he did not some this AM). Negative for chest pain, claudication, irregular heartbeat, near-syncope, orthopnea (Sleeps in recliner for general comfort, not breathing and done so for > 20 yrs), palpitations, paroxysmal nocturnal dyspnea and syncope.   Respiratory: Negative for cough, hemoptysis, shortness of breath, sleep disturbances due to breathing, snoring, sputum production and wheezing.    Endocrine: Negative for cold intolerance, heat intolerance, polydipsia and polyuria.   Hematologic/Lymphatic: Negative for bleeding problem. Does  "not bruise/bleed easily.   Musculoskeletal: Negative for falls, muscle cramps, myalgias and neck pain.   Gastrointestinal: Negative for bloating, abdominal pain, change in bowel habit, constipation, diarrhea, hematochezia, jaundice, melena and nausea.   Genitourinary: Negative for bladder incontinence, dysuria, frequency, hematuria, hesitancy, incomplete emptying and urgency.   Neurological: Negative for brief paralysis, dizziness, focal weakness, headaches, numbness, paresthesias, seizures and weakness.   Psychiatric/Behavioral: Negative for depression and memory loss. The patient is not nervous/anxious.        Objective:   Blood pressure (!) 82/0, pulse 107, temperature 98.3 °F (36.8 °C), temperature source Oral, height 6' 2" (1.88 m), weight 81.6 kg (180 lb).body mass index is 23.11 kg/m².  Doppler: 82 mm Hg  Physical Exam  Constitutional:       General: He is not in acute distress.     Appearance: He is well-developed. He is not ill-appearing, toxic-appearing or diaphoretic.   HENT:      Head: Normocephalic and atraumatic.   Eyes:      General: No scleral icterus.        Right eye: No discharge.         Left eye: No discharge.      Conjunctiva/sclera: Conjunctivae normal.   Neck:      Thyroid: No thyromegaly.      Vascular: No JVD.      Trachea: No tracheal deviation.   Cardiovascular:      Comments: Normal LVAD sounds; DL 1  Pulmonary:      Effort: Pulmonary effort is normal. No respiratory distress.      Breath sounds: Normal breath sounds. No wheezing or rales.   Abdominal:      General: Bowel sounds are normal. There is no distension (liver span is normal).      Palpations: Abdomen is soft. There is no mass.      Tenderness: There is no abdominal tenderness. There is no guarding or rebound.   Musculoskeletal:         General: Swelling (0.5+) present. No tenderness.      Right lower leg: Edema (0.5+) present.      Left lower leg: Edema (0.5+) present.   Skin:     General: Skin is warm and dry.   Neurological: "      General: No focal deficit present.      Mental Status: He is alert. Mental status is at baseline.   Psychiatric:         Mood and Affect: Mood normal.         Behavior: Behavior normal.         Thought Content: Thought content normal.         Judgment: Judgment normal.         Lab Results   Component Value Date     (H) 01/06/2022     01/06/2022    K 4.0 01/06/2022    MG 1.9 01/06/2022     01/06/2022    CO2 30 (H) 01/06/2022    PHOS 2.9 01/06/2022    BUN 18 01/06/2022    CREATININE 1.2 01/06/2022     (H) 01/06/2022    HGBA1C 6.3 (H) 02/01/2021    AST 13 01/06/2022    ALT 6 (L) 01/06/2022    ALBUMIN 3.1 (L) 01/06/2022    PROT 8.5 (H) 01/06/2022    BILITOT 1.4 (H) 01/06/2022    WBC 9.91 01/06/2022    HGB 10.3 (L) 01/06/2022    HCT 33.7 (L) 01/06/2022    HCT 25 (L) 01/15/2021     01/06/2022    INR 2.2 (H) 01/06/2022    INR 2.3 12/29/2021     01/06/2022    TSH 1.420 11/16/2020    CHOL 134 09/22/2021    HDL 49 09/22/2021    LDLCALC 72.8 09/22/2021    TRIG 61 09/22/2021     Last ECHO 9/22/21 Summary  · There is an LVAD present. Base speed is 5000 RPMs. The pump type is a Heartmate III. The interventricular septum appears midline. The aortic valve opens intermittently.  · The left ventricle is moderately enlarged with severely decreased systolic function. The estimated ejection fraction is 25%.  · There is left ventricular global hypokinesis.  · Moderate right ventricular enlargement with mildly reduced right ventricular systolic function.  · Grade III left ventricular diastolic dysfunction.  · Mild biatrial enlargement.  · Mild mitral regurgitation.  · The estimated PA systolic pressure is 36 mmHg.  · Intermediate central venous pressure (8 mmHg).  · The sinuses of Valsalva is mildly dilated.  · The ascending aorta is mildly dilated.      Assessment:      1. Heart replaced by heart assist device    2. Chronic combined systolic and diastolic congestive heart failure    3. NICM  (nonischemic cardiomyopathy)    4. Essential hypertension    5. Type 2 diabetes mellitus without complication, without long-term current use of insulin    6. Hyperlipidemia associated with type 2 diabetes mellitus        Plan:   Supplies ordered are medically necessary for care of LVAD and DL    Post LVAD goals of care are to feel stronger and be more active, control HF and avoid admission.    He does not meet criteria for home health so will change INR to [facility]     Work on regular walking program    Will not change diuretic for minor edema with lower BNP and normal CVP on exam    Patient is now NYHA II     Recommend 2 gram sodium restriction and 1500cc fluid restriction.  Encourage physical activity with graded exercise program.  Requested patient to weigh themselves daily, and to notify us if their weight increases by more than 3 lbs in 1 day or 5 lbs in 1 week.     Listed for transplant: No    UNOS Patient Status  Functional Status: 90% - Able to carry on normal activity: minor symptoms of disease  Physical Capacity: No Limitations  Working for Income: No  If no, reason not working: Patient Choice - Retired

## 2022-01-06 NOTE — PROGRESS NOTES
"Date of Implant with Heartmate 3 LVAD: 1/13/2021    PATIENT ARRIVED IN CLINIC:  Ambulatory   Accompanied by: spouse    Vitals  Temperature, oral:   Temp Readings from Last 1 Encounters:   01/06/22 98.3 °F (36.8 °C) (Oral)     Blood Pressure:   BP Readings from Last 3 Encounters:   01/06/22 (!) 82/0   11/03/21 (!) 100/0   09/22/21 (!) 98/0        VAD Interrogation:  TXP JACEY INTERROGATIONS 1/6/2022 11/3/2021 9/22/2021   Type HeartMate3 HeartMate3 HeartMate3   Flow 4.0 3.5 3.8   Speed 5000 5000 5000   PI 4.9 7.6 5.9   Power (Edwards) 3.4 3.5 3.4   LSL 4600 4600 4600   Pulsatility Intermittent pulse No Pulse Pulse       History Log: [id number]  Problems / Issues / Alarms with VAD if any: None noted  VAD Sounds: HM3 Smooth  Heartmate 3 Module Cable:  No yellow exposed and Attempted to unscrew modular cable to ensure it will be able to come lose in the event we ever need to change the modular cable while patient held the driveline in place so it would not move. Modular cable connection able to be unscrewed and re-tightened. Instructed pt to perform this weekly.    HCT:   Lab Results   Component Value Date    HCT 33.7 (L) 01/06/2022    HCT 25 (L) 01/15/2021       Complaints/reason for visit today: routine    VAD Binder With Patient: yes   Reviewed VAD Numbers In Binder: yes  Enrolled in Care Companion: no    Equipment:  Emergency Equipment With Patient: yes   Any Equipment Issues: None noted   It is medically necessary to ensure patient has properly functioning equipment and wearables to prevent infection, injury or death to patient.     DLES Assessment:  Appearance Of Driveline: "1"  Antibiotics: NO  Velour: no  Manual & Visual Inspection Of Driveline: No kinks or tears noted  Stabilization Device In Use: yes, [place] securement device      Patient MyChart Questionnaire: No flowsheet data found.     Assessment:   PAIN: NO  Complaints Of Nausea / Vomiting: None noted    Appearance and Frequency Of Stools: normal and formed " without blood & daily  Color Of Urine: clear/yellow  Coping/Depression/Anxiety: coping okay  Sleep Habits: 7 hrs /night  Sleep Aids: None noted  Showering: Yes, reminded to change dressing immediately after drying off  Activity/Exercise: walking   Driving: Yes. Reminded to pull over should there be an alarm before looking down at controller.    DLES Dressing Care:   Frequency of Dressing Changes: every other day & daily kit  Pt In Need Of Management Kits?:yes -   1 Box of daily kit  It is medically necessary to have VAD management kits in order to prevent infection or to assist in the healing of an infected DLES.    Labs:    Chemistry        Component Value Date/Time     01/06/2022 0747    K 4.0 01/06/2022 0747     01/06/2022 0747    CO2 30 (H) 01/06/2022 0747    BUN 18 01/06/2022 0747    CREATININE 1.2 01/06/2022 0747     (H) 01/06/2022 0747        Component Value Date/Time    CALCIUM 9.2 01/06/2022 0747    ALKPHOS 144 (H) 01/06/2022 0747    AST 13 01/06/2022 0747    ALT 6 (L) 01/06/2022 0747    BILITOT 1.4 (H) 01/06/2022 0747    ESTGFRAFRICA >60.0 01/06/2022 0747    EGFRNONAA >60.0 01/06/2022 0747            Magnesium   Date Value Ref Range Status   01/06/2022 1.9 1.6 - 2.6 mg/dL Final       Lab Results   Component Value Date    WBC 9.91 01/06/2022    HGB 10.3 (L) 01/06/2022    HCT 33.7 (L) 01/06/2022    MCV 93 01/06/2022     01/06/2022       Lab Results   Component Value Date    INR 2.2 (H) 01/06/2022    INR 2.3 12/29/2021    INR 2.2 12/21/2021       BNP   Date Value Ref Range Status   01/06/2022 338 (H) 0 - 99 pg/mL Final     Comment:     Values of less than 100 pg/ml are consistent with non-CHF populations.   11/03/2021 1,180 (H) 0 - 99 pg/mL Final     Comment:     Values of less than 100 pg/ml are consistent with non-CHF populations.   09/22/2021 571 (H) 0 - 99 pg/mL Final     Comment:     Values of less than 100 pg/ml are consistent with non-CHF populations.       LD   Date Value Ref  Range Status   01/06/2022 171 110 - 260 U/L Final     Comment:     Results are increased in hemolyzed samples.   11/03/2021 206 110 - 260 U/L Final     Comment:     Results are increased in hemolyzed samples.   09/22/2021 200 110 - 260 U/L Final     Comment:     Results are increased in hemolyzed samples.       Labs reviewed with patient: YES      Patient Satisfaction Survey completed per patient: No  (explained about signature and box to check)  Medication reconciliation: per MA.  Medication Detail updated today: patient did not bring the card  Coumadin Managed by: [facility]    Education: Reviewed driveline care, emergency procedures, how to change the controller, alarms with patient, as well as discussed how to page the VAD coordinator in case of an emergency.   Covid - 19 education: Reminded patient/caregiver to check temperature daily and call us if it is > 99.0.  Reminded them  to stay 6 feet away from other people, wash hands frequently, don't touch your face and stay home except to get labs, medications, and appts.    Covid Vaccine: Pt informed that we are encouraging all VAD patients to receive the COVID vaccine.  Informed pt that they can take tylenol but should avoid other NSAIDs.      Plans/Needs: routine f/u w/ Dr Ramos. Patient reports overall feeling well. Has had mild h/a in am several times a week for the past 3 weeks. Reports that it improves after taking medications. States he checks bp before meds and is not elevated. No changes at this time.    Return to clinic in 2 months w/ echo and FLP     Hurricane Season: No

## 2022-01-06 NOTE — LETTER
January 6, 2022        Teresa Mendoza  [address]  [address]  [facility] [address]  Phone: [phone]  Fax: [phone]             [facility]  [address]  [place] [address]  Phone: [phone]   Patient: Rocco France   MR Number: [id number]   YOB: 1955   Date of Visit: 1/6/2022       Dear Dr. Teresa Mendoza    Thank you for referring Rocco France to me for evaluation. Attached you will find relevant portions of my assessment and plan of care.    If you have questions, please do not hesitate to call me. I look forward to following Rocco France along with you.    Sincerely,    Manuel Ramos Jr, MD    Enclosure    If you would like to receive this communication electronically, please contact [email or web address] or [phone] to request [employer] access.    [employer] is a tool which provides read-only access to select patient information with whom you have a relationship. Its easy to use and provides real time access to review your patients record including encounter summaries, notes, results, and demographic information.    If you feel you have received this communication in error or would no longer like to receive these types of communications, please e-mail [email or web address]

## 2022-01-11 NOTE — PROGRESS NOTES
1/10/22 missed INR and patient rescheduled for 1/11/22. Patient states home health nurse coming to do INR today but he doesn't know HH name, phone, or fax number.  Patient instructed to have HH nurse contact us that information when she comes today.    1/12/22  INR result not received from HH.  I called patient and left voice message as he was suppose to obtain this information when home visit was done on 1/11/22 or if no home visit done on 1/11/22 for patient to contact [facility] to be scheduled for lab appointment and/or INR standing order to outside lab.  I also routed chart asking LVAD Team if they have HH's name/phone number so we can contact for INR result.    Kacey Barr RN from LVAD team sent HH information which was added to patient's chart.

## 2022-01-12 ENCOUNTER — ANTI-COAG VISIT (OUTPATIENT)
Dept: CARDIOLOGY | Facility: CLINIC | Age: 67
End: 2022-01-12
Payer: MEDICARE

## 2022-01-12 DIAGNOSIS — Z95.811 LVAD (LEFT VENTRICULAR ASSIST DEVICE) PRESENT: Primary | ICD-10-CM

## 2022-01-12 LAB — INR PPP: 1.9

## 2022-01-12 PROCEDURE — 93793 PR ANTICOAGULANT MGMT FOR PT TAKING WARFARIN: ICD-10-PCS | Mod: S$GLB,,,

## 2022-01-12 PROCEDURE — 93793 ANTICOAG MGMT PT WARFARIN: CPT | Mod: S$GLB,,,

## 2022-01-18 ENCOUNTER — ANTI-COAG VISIT (OUTPATIENT)
Dept: CARDIOLOGY | Facility: CLINIC | Age: 67
End: 2022-01-18
Payer: MEDICARE

## 2022-01-18 DIAGNOSIS — Z95.811 LVAD (LEFT VENTRICULAR ASSIST DEVICE) PRESENT: Primary | ICD-10-CM

## 2022-01-18 LAB — INR PPP: 2.6

## 2022-01-18 PROCEDURE — 93793 PR ANTICOAGULANT MGMT FOR PT TAKING WARFARIN: ICD-10-PCS | Mod: S$GLB,,,

## 2022-01-18 PROCEDURE — 93793 ANTICOAG MGMT PT WARFARIN: CPT | Mod: S$GLB,,,

## 2022-01-25 ENCOUNTER — PATIENT MESSAGE (OUTPATIENT)
Dept: CARDIOTHORACIC SURGERY | Facility: CLINIC | Age: 67
End: 2022-01-25
Payer: MEDICARE

## 2022-01-25 ENCOUNTER — ANTI-COAG VISIT (OUTPATIENT)
Dept: CARDIOLOGY | Facility: CLINIC | Age: 67
End: 2022-01-25
Payer: MEDICARE

## 2022-01-25 DIAGNOSIS — Z95.811 LVAD (LEFT VENTRICULAR ASSIST DEVICE) PRESENT: Primary | ICD-10-CM

## 2022-01-25 LAB — INR PPP: 2

## 2022-01-25 PROCEDURE — 93793 ANTICOAG MGMT PT WARFARIN: CPT | Mod: S$GLB,,,

## 2022-01-25 PROCEDURE — 93793 PR ANTICOAGULANT MGMT FOR PT TAKING WARFARIN: ICD-10-PCS | Mod: S$GLB,,,

## 2022-01-27 ENCOUNTER — HOSPITAL ENCOUNTER (INPATIENT)
Facility: HOSPITAL | Age: 67
LOS: 21 days | Discharge: HOME HEALTH | DRG: 082 | End: 2022-02-17
Attending: INTERNAL MEDICINE | Admitting: INTERNAL MEDICINE
Payer: MEDICARE

## 2022-01-27 DIAGNOSIS — I50.42 CHRONIC COMBINED SYSTOLIC (CONGESTIVE) AND DIASTOLIC (CONGESTIVE) HEART FAILURE: ICD-10-CM

## 2022-01-27 DIAGNOSIS — I47.10 PSVT (PAROXYSMAL SUPRAVENTRICULAR TACHYCARDIA): ICD-10-CM

## 2022-01-27 DIAGNOSIS — Z95.811 LVAD (LEFT VENTRICULAR ASSIST DEVICE) PRESENT: ICD-10-CM

## 2022-01-27 DIAGNOSIS — E11.9 TYPE 2 DIABETES MELLITUS WITHOUT COMPLICATION, WITHOUT LONG-TERM CURRENT USE OF INSULIN: ICD-10-CM

## 2022-01-27 DIAGNOSIS — I60.9 SUBARACHNOID BLEED: ICD-10-CM

## 2022-01-27 DIAGNOSIS — J96.00 ACUTE RESPIRATORY FAILURE REQUIRING REINTUBATION: ICD-10-CM

## 2022-01-27 DIAGNOSIS — I50.42 CHRONIC COMBINED SYSTOLIC AND DIASTOLIC CONGESTIVE HEART FAILURE: ICD-10-CM

## 2022-01-27 DIAGNOSIS — R00.0 TACHYCARDIA: ICD-10-CM

## 2022-01-27 DIAGNOSIS — I10 ESSENTIAL HYPERTENSION: ICD-10-CM

## 2022-01-27 DIAGNOSIS — I47.10 SVT (SUPRAVENTRICULAR TACHYCARDIA): ICD-10-CM

## 2022-01-27 DIAGNOSIS — I60.9 SUBARACHNOID HEMORRHAGE: ICD-10-CM

## 2022-01-27 DIAGNOSIS — I62.00 SUBDURAL BLEEDING: ICD-10-CM

## 2022-01-27 DIAGNOSIS — I42.8 NICM (NONISCHEMIC CARDIOMYOPATHY): ICD-10-CM

## 2022-01-27 DIAGNOSIS — I50.43 ACUTE ON CHRONIC COMBINED SYSTOLIC (CONGESTIVE) AND DIASTOLIC (CONGESTIVE) HEART FAILURE: ICD-10-CM

## 2022-01-27 DIAGNOSIS — Z79.01 ANTICOAGULATED: ICD-10-CM

## 2022-01-27 DIAGNOSIS — S06.5XAA BILATERAL SUBDURAL HEMATOMAS: Primary | ICD-10-CM

## 2022-01-27 LAB
ABO + RH BLD: NORMAL
ALBUMIN SERPL BCP-MCNC: 2.6 G/DL (ref 3.5–5.2)
ALP SERPL-CCNC: 120 U/L (ref 55–135)
ALT SERPL W/O P-5'-P-CCNC: 16 U/L (ref 10–44)
ANION GAP SERPL CALC-SCNC: 11 MMOL/L (ref 8–16)
APTT BLDCRRT: 25.7 SEC (ref 21–32)
APTT BLDCRRT: 31.8 SEC (ref 21–32)
AST SERPL-CCNC: 38 U/L (ref 10–40)
BASOPHILS # BLD AUTO: 0.02 K/UL (ref 0–0.2)
BASOPHILS NFR BLD: 0.2 % (ref 0–1.9)
BILIRUB DIRECT SERPL-MCNC: 0.6 MG/DL (ref 0.1–0.3)
BILIRUB SERPL-MCNC: 1.6 MG/DL (ref 0.1–1)
BLD GP AB SCN CELLS X3 SERPL QL: NORMAL
BNP SERPL-MCNC: 452 PG/ML (ref 0–99)
BUN SERPL-MCNC: 33 MG/DL (ref 8–23)
CALCIUM SERPL-MCNC: 9 MG/DL (ref 8.7–10.5)
CHLORIDE SERPL-SCNC: 101 MMOL/L (ref 95–110)
CO2 SERPL-SCNC: 22 MMOL/L (ref 23–29)
CREAT SERPL-MCNC: 1.3 MG/DL (ref 0.5–1.4)
CRP SERPL-MCNC: 44.82 MG/L (ref 0–3.19)
DIFFERENTIAL METHOD: ABNORMAL
EOSINOPHIL # BLD AUTO: 0 K/UL (ref 0–0.5)
EOSINOPHIL NFR BLD: 0 % (ref 0–8)
ERYTHROCYTE [DISTWIDTH] IN BLOOD BY AUTOMATED COUNT: 15.1 % (ref 11.5–14.5)
EST. GFR  (AFRICAN AMERICAN): >60 ML/MIN/1.73 M^2
EST. GFR  (NON AFRICAN AMERICAN): 56.9 ML/MIN/1.73 M^2
GLUCOSE SERPL-MCNC: 154 MG/DL (ref 70–110)
HCT VFR BLD AUTO: 34.2 % (ref 40–54)
HGB BLD-MCNC: 10.4 G/DL (ref 14–18)
IMM GRANULOCYTES # BLD AUTO: 0.03 K/UL (ref 0–0.04)
IMM GRANULOCYTES NFR BLD AUTO: 0.3 % (ref 0–0.5)
INR PPP: 1.3 (ref 0.8–1.2)
INR PPP: 1.4 (ref 0.8–1.2)
LDH SERPL L TO P-CCNC: 249 U/L (ref 110–260)
LYMPHOCYTES # BLD AUTO: 1.7 K/UL (ref 1–4.8)
LYMPHOCYTES NFR BLD: 15.9 % (ref 18–48)
MAGNESIUM SERPL-MCNC: 2 MG/DL (ref 1.6–2.6)
MCH RBC QN AUTO: 28.7 PG (ref 27–31)
MCHC RBC AUTO-ENTMCNC: 30.4 G/DL (ref 32–36)
MCV RBC AUTO: 95 FL (ref 82–98)
MONOCYTES # BLD AUTO: 1 K/UL (ref 0.3–1)
MONOCYTES NFR BLD: 9.2 % (ref 4–15)
NEUTROPHILS # BLD AUTO: 7.8 K/UL (ref 1.8–7.7)
NEUTROPHILS NFR BLD: 74.4 % (ref 38–73)
NRBC BLD-RTO: 0 /100 WBC
PHOSPHATE SERPL-MCNC: 3.7 MG/DL (ref 2.7–4.5)
PLATELET # BLD AUTO: 184 K/UL (ref 150–450)
PMV BLD AUTO: 10.9 FL (ref 9.2–12.9)
POTASSIUM SERPL-SCNC: 4.6 MMOL/L (ref 3.5–5.1)
PREALB SERPL-MCNC: 9 MG/DL (ref 20–43)
PROT SERPL-MCNC: 8.7 G/DL (ref 6–8.4)
PROTHROMBIN TIME: 14.5 SEC (ref 9–12.5)
PROTHROMBIN TIME: 14.9 SEC (ref 9–12.5)
RBC # BLD AUTO: 3.62 M/UL (ref 4.6–6.2)
SODIUM SERPL-SCNC: 134 MMOL/L (ref 136–145)
WBC # BLD AUTO: 10.41 K/UL (ref 3.9–12.7)

## 2022-01-27 PROCEDURE — 99223 PR INITIAL HOSPITAL CARE,LEVL III: ICD-10-PCS | Mod: GC,,, | Performed by: PSYCHIATRY & NEUROLOGY

## 2022-01-27 PROCEDURE — 80048 BASIC METABOLIC PNL TOTAL CA: CPT | Performed by: INTERNAL MEDICINE

## 2022-01-27 PROCEDURE — 94002 VENT MGMT INPAT INIT DAY: CPT

## 2022-01-27 PROCEDURE — 85610 PROTHROMBIN TIME: CPT | Performed by: PSYCHIATRY & NEUROLOGY

## 2022-01-27 PROCEDURE — 27000248 HC VAD-ADDITIONAL DAY

## 2022-01-27 PROCEDURE — 99223 1ST HOSP IP/OBS HIGH 75: CPT | Mod: GC,,, | Performed by: NEUROLOGICAL SURGERY

## 2022-01-27 PROCEDURE — 20000000 HC ICU ROOM

## 2022-01-27 PROCEDURE — 85025 COMPLETE CBC W/AUTO DIFF WBC: CPT | Performed by: HOSPITALIST

## 2022-01-27 PROCEDURE — 27200188 HC TRANSDUCER, ART ADULT/PEDS

## 2022-01-27 PROCEDURE — 85730 THROMBOPLASTIN TIME PARTIAL: CPT | Mod: 91 | Performed by: HOSPITALIST

## 2022-01-27 PROCEDURE — 86141 C-REACTIVE PROTEIN HS: CPT | Performed by: INTERNAL MEDICINE

## 2022-01-27 PROCEDURE — 27000221 HC OXYGEN, UP TO 24 HOURS

## 2022-01-27 PROCEDURE — 85610 PROTHROMBIN TIME: CPT | Mod: 91 | Performed by: INTERNAL MEDICINE

## 2022-01-27 PROCEDURE — 86850 RBC ANTIBODY SCREEN: CPT | Performed by: HOSPITALIST

## 2022-01-27 PROCEDURE — 51702 INSERT TEMP BLADDER CATH: CPT

## 2022-01-27 PROCEDURE — 84134 ASSAY OF PREALBUMIN: CPT | Performed by: INTERNAL MEDICINE

## 2022-01-27 PROCEDURE — 83880 ASSAY OF NATRIURETIC PEPTIDE: CPT | Performed by: INTERNAL MEDICINE

## 2022-01-27 PROCEDURE — 36620 INSERTION CATHETER ARTERY: CPT

## 2022-01-27 PROCEDURE — 83735 ASSAY OF MAGNESIUM: CPT | Performed by: INTERNAL MEDICINE

## 2022-01-27 PROCEDURE — 83615 LACTATE (LD) (LDH) ENZYME: CPT | Performed by: INTERNAL MEDICINE

## 2022-01-27 PROCEDURE — 36620 ARTERIAL LINE: ICD-10-PCS | Mod: ,,, | Performed by: HOSPITALIST

## 2022-01-27 PROCEDURE — 36620 INSERTION CATHETER ARTERY: CPT | Mod: ,,, | Performed by: HOSPITALIST

## 2022-01-27 PROCEDURE — 94761 N-INVAS EAR/PLS OXIMETRY MLT: CPT

## 2022-01-27 PROCEDURE — C1751 CATH, INF, PER/CENT/MIDLINE: HCPCS

## 2022-01-27 PROCEDURE — 43752 NASAL/OROGASTRIC W/TUBE PLMT: CPT

## 2022-01-27 PROCEDURE — 99900035 HC TECH TIME PER 15 MIN (STAT)

## 2022-01-27 PROCEDURE — 99223 PR INITIAL HOSPITAL CARE,LEVL III: ICD-10-PCS | Mod: GC,,, | Performed by: NEUROLOGICAL SURGERY

## 2022-01-27 PROCEDURE — 85730 THROMBOPLASTIN TIME PARTIAL: CPT | Performed by: INTERNAL MEDICINE

## 2022-01-27 PROCEDURE — 84100 ASSAY OF PHOSPHORUS: CPT | Performed by: INTERNAL MEDICINE

## 2022-01-27 PROCEDURE — 80076 HEPATIC FUNCTION PANEL: CPT | Performed by: INTERNAL MEDICINE

## 2022-01-27 PROCEDURE — 99223 1ST HOSP IP/OBS HIGH 75: CPT | Mod: GC,,, | Performed by: PSYCHIATRY & NEUROLOGY

## 2022-01-27 RX ORDER — LEVETIRACETAM 500 MG/5ML
500 INJECTION, SOLUTION, CONCENTRATE INTRAVENOUS EVERY 12 HOURS
Status: DISCONTINUED | OUTPATIENT
Start: 2022-01-28 | End: 2022-01-30

## 2022-01-27 RX ORDER — NICARDIPINE HYDROCHLORIDE 0.2 MG/ML
0-15 INJECTION INTRAVENOUS CONTINUOUS
Status: DISCONTINUED | OUTPATIENT
Start: 2022-01-27 | End: 2022-01-27

## 2022-01-27 RX ORDER — HYDROCODONE BITARTRATE AND ACETAMINOPHEN 500; 5 MG/1; MG/1
TABLET ORAL
Status: DISCONTINUED | OUTPATIENT
Start: 2022-01-27 | End: 2022-02-17 | Stop reason: HOSPADM

## 2022-01-27 RX ORDER — CHLORHEXIDINE GLUCONATE ORAL RINSE 1.2 MG/ML
15 SOLUTION DENTAL 2 TIMES DAILY
Status: DISCONTINUED | OUTPATIENT
Start: 2022-01-27 | End: 2022-02-07

## 2022-01-27 RX ORDER — NICARDIPINE HYDROCHLORIDE 0.2 MG/ML
0-15 INJECTION INTRAVENOUS CONTINUOUS
Status: DISCONTINUED | OUTPATIENT
Start: 2022-01-27 | End: 2022-01-28

## 2022-01-27 RX ORDER — PROPOFOL 10 MG/ML
0-50 INJECTION, EMULSION INTRAVENOUS CONTINUOUS
Status: DISCONTINUED | OUTPATIENT
Start: 2022-01-27 | End: 2022-01-28

## 2022-01-27 RX ORDER — FAMOTIDINE 10 MG/ML
20 INJECTION INTRAVENOUS 2 TIMES DAILY
Status: DISCONTINUED | OUTPATIENT
Start: 2022-01-27 | End: 2022-01-30

## 2022-01-27 RX ORDER — PROPOFOL 10 MG/ML
INJECTION, EMULSION INTRAVENOUS
Status: DISPENSED
Start: 2022-01-27 | End: 2022-01-28

## 2022-01-27 RX ORDER — LISINOPRIL 20 MG/1
20 TABLET ORAL 2 TIMES DAILY
Status: DISCONTINUED | OUTPATIENT
Start: 2022-01-27 | End: 2022-01-28

## 2022-01-27 RX ORDER — INSULIN ASPART 100 [IU]/ML
0-5 INJECTION, SOLUTION INTRAVENOUS; SUBCUTANEOUS EVERY 6 HOURS PRN
Status: DISCONTINUED | OUTPATIENT
Start: 2022-01-27 | End: 2022-02-17 | Stop reason: HOSPADM

## 2022-01-27 RX ORDER — GLUCAGON 1 MG
1 KIT INJECTION
Status: DISCONTINUED | OUTPATIENT
Start: 2022-01-27 | End: 2022-02-17 | Stop reason: HOSPADM

## 2022-01-27 RX ORDER — ATORVASTATIN CALCIUM 20 MG/1
80 TABLET, FILM COATED ORAL DAILY
Status: DISCONTINUED | OUTPATIENT
Start: 2022-01-28 | End: 2022-02-17 | Stop reason: HOSPADM

## 2022-01-27 RX ADMIN — IOHEXOL 100 ML: 350 INJECTION, SOLUTION INTRAVENOUS at 11:01

## 2022-01-27 NOTE — HPI
65 yo M with HTN, HLD, T2DM, end stage heart failure s/p LVAD (heartmate 3) placed 1/13 presents to CCU after fall resulting in bilateral subdural hematomas. Patient presented to outside facility following a syncopal episode at home, outside CT head shows two acute on chronic subdural hematomas and subarachnoid hemorrhage with midline shift. It is unclear when the patient fell or how many times. Patient is admitted to CCU under care of heart transplant service. Neuro critical care was consulted due to bilateral SDH.

## 2022-01-27 NOTE — ASSESSMENT & PLAN NOTE
66yoM with CHF due to NICMP s/p LVAD 1/13 (heart mate 3), s/p ICD, on Coumadin & , CAD, HTN, HLD, T2DM presents for traumatic bilateral subdural hematomas    - Admit to CICU for LVAD  - Neuro checks Q1  - SBP< 140; MAP >65  - Hold AC/AP  - STAT CTH  - Coags & CBC  - NSGY following  - Decision on Anticoagulation based on CTH and coags  - EEG after CTH (conccern for NCSE)  - Further management per primary team

## 2022-01-28 PROBLEM — J96.00 ACUTE RESPIRATORY FAILURE REQUIRING REINTUBATION: Status: ACTIVE | Noted: 2022-01-28

## 2022-01-28 LAB
ALLENS TEST: ABNORMAL
ALLENS TEST: ABNORMAL
ANION GAP SERPL CALC-SCNC: 8 MMOL/L (ref 8–16)
BASOPHILS # BLD AUTO: 0.01 K/UL (ref 0–0.2)
BASOPHILS NFR BLD: 0.1 % (ref 0–1.9)
BUN SERPL-MCNC: 33 MG/DL (ref 8–23)
CALCIUM SERPL-MCNC: 9 MG/DL (ref 8.7–10.5)
CHLORIDE SERPL-SCNC: 101 MMOL/L (ref 95–110)
CO2 SERPL-SCNC: 24 MMOL/L (ref 23–29)
CREAT SERPL-MCNC: 1.2 MG/DL (ref 0.5–1.4)
CREAT UR-MCNC: 119 MG/DL (ref 23–375)
DELSYS: ABNORMAL
DELSYS: ABNORMAL
DIFFERENTIAL METHOD: ABNORMAL
EOSINOPHIL # BLD AUTO: 0 K/UL (ref 0–0.5)
EOSINOPHIL NFR BLD: 0 % (ref 0–8)
ERYTHROCYTE [DISTWIDTH] IN BLOOD BY AUTOMATED COUNT: 14.8 % (ref 11.5–14.5)
ERYTHROCYTE [SEDIMENTATION RATE] IN BLOOD BY WESTERGREN METHOD: 18 MM/H
EST. GFR  (AFRICAN AMERICAN): >60 ML/MIN/1.73 M^2
EST. GFR  (NON AFRICAN AMERICAN): >60 ML/MIN/1.73 M^2
ESTIMATED AVG GLUCOSE: 174 MG/DL (ref 68–131)
FIO2: 35
FIO2: 50
GLUCOSE SERPL-MCNC: 101 MG/DL (ref 70–110)
HBA1C MFR BLD: 7.7 % (ref 4–5.6)
HCO3 UR-SCNC: 25.3 MMOL/L (ref 24–28)
HCO3 UR-SCNC: 26.2 MMOL/L (ref 24–28)
HCT VFR BLD AUTO: 31.4 % (ref 40–54)
HGB BLD-MCNC: 9.8 G/DL (ref 14–18)
IMM GRANULOCYTES # BLD AUTO: 0.03 K/UL (ref 0–0.04)
IMM GRANULOCYTES NFR BLD AUTO: 0.4 % (ref 0–0.5)
LYMPHOCYTES # BLD AUTO: 1.7 K/UL (ref 1–4.8)
LYMPHOCYTES NFR BLD: 21.3 % (ref 18–48)
MAGNESIUM SERPL-MCNC: 1.9 MG/DL (ref 1.6–2.6)
MCH RBC QN AUTO: 28.5 PG (ref 27–31)
MCHC RBC AUTO-ENTMCNC: 31.2 G/DL (ref 32–36)
MCV RBC AUTO: 91 FL (ref 82–98)
MIN VOL: 8.36
MODE: ABNORMAL
MODE: ABNORMAL
MONOCYTES # BLD AUTO: 1 K/UL (ref 0.3–1)
MONOCYTES NFR BLD: 11.8 % (ref 4–15)
NEUTROPHILS # BLD AUTO: 5.4 K/UL (ref 1.8–7.7)
NEUTROPHILS NFR BLD: 66.4 % (ref 38–73)
NRBC BLD-RTO: 0 /100 WBC
OSMOLALITY SERPL: 303 MOSM/KG (ref 280–300)
OSMOLALITY UR: 665 MOSM/KG (ref 50–1200)
PCO2 BLDA: 36.8 MMHG (ref 35–45)
PCO2 BLDA: 38.4 MMHG (ref 35–45)
PEEP: 8
PH SMN: 7.44 [PH] (ref 7.35–7.45)
PH SMN: 7.45 [PH] (ref 7.35–7.45)
PHOSPHATE SERPL-MCNC: 3 MG/DL (ref 2.7–4.5)
PIP: 20
PLATELET # BLD AUTO: 181 K/UL (ref 150–450)
PMV BLD AUTO: 10.2 FL (ref 9.2–12.9)
PO2 BLDA: 205 MMHG (ref 80–100)
PO2 BLDA: 262 MMHG (ref 80–100)
POC BE: 1 MMOL/L
POC BE: 2 MMOL/L
POC SATURATED O2: 100 % (ref 95–100)
POC SATURATED O2: 100 % (ref 95–100)
POC TCO2: 26 MMOL/L (ref 23–27)
POC TCO2: 27 MMOL/L (ref 23–27)
POCT GLUCOSE: 105 MG/DL (ref 70–110)
POCT GLUCOSE: 113 MG/DL (ref 70–110)
POCT GLUCOSE: 113 MG/DL (ref 70–110)
POCT GLUCOSE: 90 MG/DL (ref 70–110)
POTASSIUM SERPL-SCNC: 4 MMOL/L (ref 3.5–5.1)
RBC # BLD AUTO: 3.44 M/UL (ref 4.6–6.2)
SAMPLE: ABNORMAL
SAMPLE: ABNORMAL
SARS-COV-2 RNA RESP QL NAA+PROBE: DETECTED
SITE: ABNORMAL
SITE: ABNORMAL
SODIUM SERPL-SCNC: 133 MMOL/L (ref 136–145)
SODIUM SERPL-SCNC: 135 MMOL/L (ref 136–145)
SODIUM SERPL-SCNC: 138 MMOL/L (ref 136–145)
SODIUM UR-SCNC: 27 MMOL/L (ref 20–250)
SP02: 98
VT: 450
WBC # BLD AUTO: 8.14 K/UL (ref 3.9–12.7)

## 2022-01-28 PROCEDURE — 95720 EEG PHY/QHP EA INCR W/VEEG: CPT | Mod: ,,, | Performed by: PSYCHIATRY & NEUROLOGY

## 2022-01-28 PROCEDURE — 25000003 PHARM REV CODE 250: Performed by: NURSE PRACTITIONER

## 2022-01-28 PROCEDURE — 83930 ASSAY OF BLOOD OSMOLALITY: CPT | Performed by: INTERNAL MEDICINE

## 2022-01-28 PROCEDURE — 99291 CRITICAL CARE FIRST HOUR: CPT | Mod: ,,, | Performed by: NURSE PRACTITIONER

## 2022-01-28 PROCEDURE — A4217 STERILE WATER/SALINE, 500 ML: HCPCS | Performed by: PSYCHIATRY & NEUROLOGY

## 2022-01-28 PROCEDURE — 63600175 PHARM REV CODE 636 W HCPCS: Performed by: NURSE PRACTITIONER

## 2022-01-28 PROCEDURE — 25000003 PHARM REV CODE 250: Performed by: INTERNAL MEDICINE

## 2022-01-28 PROCEDURE — 20000000 HC ICU ROOM

## 2022-01-28 PROCEDURE — 84295 ASSAY OF SERUM SODIUM: CPT | Mod: 91 | Performed by: PSYCHIATRY & NEUROLOGY

## 2022-01-28 PROCEDURE — 25000003 PHARM REV CODE 250: Performed by: PSYCHIATRY & NEUROLOGY

## 2022-01-28 PROCEDURE — 82570 ASSAY OF URINE CREATININE: CPT | Performed by: INTERNAL MEDICINE

## 2022-01-28 PROCEDURE — 27000248 HC VAD-ADDITIONAL DAY

## 2022-01-28 PROCEDURE — 25500020 PHARM REV CODE 255: Performed by: INTERNAL MEDICINE

## 2022-01-28 PROCEDURE — 99900026 HC AIRWAY MAINTENANCE (STAT)

## 2022-01-28 PROCEDURE — 27000207 HC ISOLATION

## 2022-01-28 PROCEDURE — 99291 PR CRITICAL CARE, E/M 30-74 MINUTES: ICD-10-PCS | Mod: ,,, | Performed by: NURSE PRACTITIONER

## 2022-01-28 PROCEDURE — 99233 PR SUBSEQUENT HOSPITAL CARE,LEVL III: ICD-10-PCS | Mod: GC,,, | Performed by: NEUROLOGICAL SURGERY

## 2022-01-28 PROCEDURE — 93750 INTERROGATION VAD IN PERSON: CPT | Mod: ,,, | Performed by: INTERNAL MEDICINE

## 2022-01-28 PROCEDURE — 95700 EEG CONT REC W/VID EEG TECH: CPT

## 2022-01-28 PROCEDURE — 27200966 HC CLOSED SUCTION SYSTEM

## 2022-01-28 PROCEDURE — 95720 PR EEG, W/VIDEO, CONT RECORD, I&R, >12<26 HRS: ICD-10-PCS | Mod: ,,, | Performed by: PSYCHIATRY & NEUROLOGY

## 2022-01-28 PROCEDURE — 37799 UNLISTED PX VASCULAR SURGERY: CPT

## 2022-01-28 PROCEDURE — 80048 BASIC METABOLIC PNL TOTAL CA: CPT | Performed by: INTERNAL MEDICINE

## 2022-01-28 PROCEDURE — 84300 ASSAY OF URINE SODIUM: CPT | Performed by: INTERNAL MEDICINE

## 2022-01-28 PROCEDURE — 25000003 PHARM REV CODE 250: Performed by: HOSPITALIST

## 2022-01-28 PROCEDURE — 27000221 HC OXYGEN, UP TO 24 HOURS

## 2022-01-28 PROCEDURE — 95714 VEEG EA 12-26 HR UNMNTR: CPT

## 2022-01-28 PROCEDURE — 83036 HEMOGLOBIN GLYCOSYLATED A1C: CPT | Performed by: HOSPITALIST

## 2022-01-28 PROCEDURE — U0003 INFECTIOUS AGENT DETECTION BY NUCLEIC ACID (DNA OR RNA); SEVERE ACUTE RESPIRATORY SYNDROME CORONAVIRUS 2 (SARS-COV-2) (CORONAVIRUS DISEASE [COVID-19]), AMPLIFIED PROBE TECHNIQUE, MAKING USE OF HIGH THROUGHPUT TECHNOLOGIES AS DESCRIBED BY CMS-2020-01-R: HCPCS | Performed by: NURSE PRACTITIONER

## 2022-01-28 PROCEDURE — 93750 PR INTERROGATE VENT ASSIST DEV, IN PERSON, W PHYSICIAN ANALYSIS: ICD-10-PCS | Mod: ,,, | Performed by: INTERNAL MEDICINE

## 2022-01-28 PROCEDURE — 99900035 HC TECH TIME PER 15 MIN (STAT)

## 2022-01-28 PROCEDURE — 82803 BLOOD GASES ANY COMBINATION: CPT

## 2022-01-28 PROCEDURE — 63600175 PHARM REV CODE 636 W HCPCS: Performed by: PSYCHIATRY & NEUROLOGY

## 2022-01-28 PROCEDURE — 99233 SBSQ HOSP IP/OBS HIGH 50: CPT | Mod: GC,,, | Performed by: NEUROLOGICAL SURGERY

## 2022-01-28 PROCEDURE — 83735 ASSAY OF MAGNESIUM: CPT | Performed by: INTERNAL MEDICINE

## 2022-01-28 PROCEDURE — 99292 CRITICAL CARE ADDL 30 MIN: CPT | Mod: ,,, | Performed by: NURSE PRACTITIONER

## 2022-01-28 PROCEDURE — 85025 COMPLETE CBC W/AUTO DIFF WBC: CPT | Performed by: INTERNAL MEDICINE

## 2022-01-28 PROCEDURE — 99233 SBSQ HOSP IP/OBS HIGH 50: CPT | Mod: GC,,, | Performed by: PSYCHIATRY & NEUROLOGY

## 2022-01-28 PROCEDURE — 83935 ASSAY OF URINE OSMOLALITY: CPT | Performed by: INTERNAL MEDICINE

## 2022-01-28 PROCEDURE — 84100 ASSAY OF PHOSPHORUS: CPT | Performed by: INTERNAL MEDICINE

## 2022-01-28 PROCEDURE — 99292 PR CRITICAL CARE, ADDL 30 MIN: ICD-10-PCS | Mod: ,,, | Performed by: NURSE PRACTITIONER

## 2022-01-28 PROCEDURE — U0005 INFEC AGEN DETEC AMPLI PROBE: HCPCS | Performed by: NURSE PRACTITIONER

## 2022-01-28 PROCEDURE — 94761 N-INVAS EAR/PLS OXIMETRY MLT: CPT

## 2022-01-28 PROCEDURE — 99233 PR SUBSEQUENT HOSPITAL CARE,LEVL III: ICD-10-PCS | Mod: GC,,, | Performed by: PSYCHIATRY & NEUROLOGY

## 2022-01-28 RX ORDER — 3% SODIUM CHLORIDE 3 G/100ML
100 INJECTION, SOLUTION INTRAVENOUS CONTINUOUS
Status: DISPENSED | OUTPATIENT
Start: 2022-01-28 | End: 2022-01-28

## 2022-01-28 RX ORDER — NOREPINEPHRINE BITARTRATE/D5W 4MG/250ML
0-.2 PLASTIC BAG, INJECTION (ML) INTRAVENOUS CONTINUOUS
Status: DISCONTINUED | OUTPATIENT
Start: 2022-01-28 | End: 2022-01-31

## 2022-01-28 RX ORDER — SODIUM CHLORIDE 9 MG/ML
INJECTION, SOLUTION INTRAVENOUS CONTINUOUS
Status: DISCONTINUED | OUTPATIENT
Start: 2022-01-28 | End: 2022-02-03

## 2022-01-28 RX ADMIN — NOREPINEPHRINE BITARTRATE 0.01 MCG/KG/MIN: 4 INJECTION, SOLUTION INTRAVENOUS at 01:01

## 2022-01-28 RX ADMIN — LEVETIRACETAM 500 MG: 500 INJECTION, SOLUTION INTRAVENOUS at 09:01

## 2022-01-28 RX ADMIN — SODIUM CHLORIDE: 0.9 INJECTION, SOLUTION INTRAVENOUS at 12:01

## 2022-01-28 RX ADMIN — FAMOTIDINE 20 MG: 10 INJECTION, SOLUTION INTRAVENOUS at 01:01

## 2022-01-28 RX ADMIN — CHLORHEXIDINE GLUCONATE 0.12% ORAL RINSE 15 ML: 1.2 LIQUID ORAL at 12:01

## 2022-01-28 RX ADMIN — LEVETIRACETAM 3000 MG: 500 INJECTION, SOLUTION INTRAVENOUS at 12:01

## 2022-01-28 RX ADMIN — SODIUM ACETATE: 164 INJECTION, SOLUTION, CONCENTRATE INTRAVENOUS at 03:01

## 2022-01-28 RX ADMIN — CHLORHEXIDINE GLUCONATE 0.12% ORAL RINSE 15 ML: 1.2 LIQUID ORAL at 08:01

## 2022-01-28 RX ADMIN — REMDESIVIR 200 MG: 100 INJECTION, POWDER, LYOPHILIZED, FOR SOLUTION INTRAVENOUS at 12:01

## 2022-01-28 RX ADMIN — ATORVASTATIN CALCIUM 80 MG: 20 TABLET, FILM COATED ORAL at 10:01

## 2022-01-28 RX ADMIN — FAMOTIDINE 20 MG: 10 INJECTION, SOLUTION INTRAVENOUS at 10:01

## 2022-01-28 RX ADMIN — LEVETIRACETAM 500 MG: 500 INJECTION, SOLUTION INTRAVENOUS at 10:01

## 2022-01-28 RX ADMIN — CHLORHEXIDINE GLUCONATE 0.12% ORAL RINSE 15 ML: 1.2 LIQUID ORAL at 10:01

## 2022-01-28 RX ADMIN — FAMOTIDINE 20 MG: 10 INJECTION, SOLUTION INTRAVENOUS at 08:01

## 2022-01-28 NOTE — H&P
[facility] - Cardiac Intensive [facility]  Heart Transplant  H&P    Patient Name: Rocco France  MRN: [id number]  Admission Date: 1/27/2022  Attending Physician: Deana Lake MD  Primary Care Provider: Teresa Mendoza NP  Principal Problem:Bilateral subdural hematomas    Subjective:     History of Present Illness:  67 yo BM with stage D CHF due to NICMP s/p HM3, s/p ICD, HTN, HLD, T2DM was transferred from outside hospital emergency room after he was found to have subdural hematoma/subarachnoid hemorrhage.  Patient presented to the ER after he was found to have a syncopal event.  Also patient had respiratory distress on arrival for which he was intubated.  Patient was transferred here for further evaluation.  On arrival patient is intubated and is on propofol.  Neurosurgery and Neuro Critical Care were immediately informed.  Neurosurgery recommended to repeat CT after reviewing the prior CT done in the ER in [place].  INR on arrival is 1.6.  He also had a low flow with a increased PI around 4:00 p.m. this evening.  According to wife patient was complaining of headache for the last 2 weeks..  He went to post office this [place] and she does not know what exactly happened..  ICD was interrogated and did not show any VT/VF.       Past Medical History:   Diagnosis Date    CLARISSE (acute kidney injury) 1/11/2021    CHF (congestive heart failure)     Chronic combined systolic and diastolic congestive heart failure 1/9/2021    Coronary artery disease     Diabetes mellitus     Hypertension     Kidney stones        Past Surgical History:   Procedure Laterality Date    CARDIAC CATHETERIZATION  2010    no stents    CARDIAC DEFIBRILLATOR PLACEMENT  2010    CARDIAC DEFIBRILLATOR PLACEMENT      HERNIA REPAIR      IRRIGATION OF MEDIASTINUM N/A 1/14/2021    Procedure: IRRIGATION, MEDIASTINUM;  Surgeon: Zenon Corona MD;  Location: [facility] OR [address] [place] [address];  Service: Cardiovascular;  Laterality: N/A;    KNEE ARTHROSCOPY Right      LEFT VENTRICULAR ASSIST DEVICE Left 1/13/2021    Procedure: INSERTION- HeartMate 3 LEFT VENTRICULAR ASSIST DEVICE ;  Surgeon: Zenon Corona MD;  Location: [facility] OR [facility];  Service: Cardiovascular;  Laterality: Left;    RECONSTRUCTION OF PERICARDIUM N/A 1/14/2021    Procedure: RECONSTRUCTION, PERICARDIUM;  Surgeon: Zenon Corona MD;  Location: [facility] OR [facility];  Service: Cardiovascular;  Laterality: N/A;    RIGHT HEART CATHETERIZATION Right 11/2/2020    Procedure: INSERTION, CATHETER, RIGHT HEART;  Surgeon: Deana Lake MD;  Location: [facility] CATH LAB;  Service: Cardiology;  Laterality: Right;    STERNAL WOUND CLOSURE  1/13/2021    Procedure: TEMPORARY CLOSURE OF CHEST;  Surgeon: Zenon Corona MD;  Location: [facility] OR [facility];  Service: Cardiovascular;;    STERNAL WOUND CLOSURE N/A 1/14/2021    Procedure: CLOSURE, WOUND, STERNUM;  Surgeon: Zenon Corona MD;  [facility];  Service: Cardiovascular;  Laterality: N/A;       Review of patient's allergies indicates:   Allergen Reactions    Pcn [penicillins] Hives       Current Facility-Administered Medications   Medication    0.9%  NaCl infusion (for blood administration)    [START ON 1/28/2022] atorvastatin tablet 80 mg    chlorhexidine 0.12 % solution 15 mL    famotidine (PF) injection 20 mg    lisinopriL tablet 20 mg    propofol (DIPRIVAN) 10 mg/mL infusion     Family History     Problem Relation (Age of Onset)    Heart attack Mother, Brother    Heart failure Brother    Hypertension Brother        Tobacco Use    Smoking status: Current Some Day Smoker     Types: Cigarettes    Smokeless tobacco: Never Used   Substance and Sexual Activity    Alcohol use: No    Drug use: Not on file    Sexual activity: Not on file     Review of Systems   Unable to perform ROS: Intubated     Objective:     Vital Signs (Most Recent):  Temp: 97 °F (36.1 °C) (01/27/22 2000)  Pulse: 93 (01/27/22 2000)  Resp: 18 (01/27/22 1822)  BP: (!) 110/0 (01/27/22  1915)  SpO2: 100 % (01/27/22 2000) Vital Signs (24h Range):  Temp:  [97 °F (36.1 °C)-98.5 °F (36.9 °C)] 97 °F (36.1 °C)  Pulse:  [] 93  Resp:  [17-18] 18  SpO2:  [99 %-100 %] 100 %  BP: (105-111)/(0-89) 110/0     Patient Vitals for the past 72 hrs (Last 3 readings):   Weight   01/27/22 2000 82.4 kg (181 lb 10.5 oz)     Body mass index is 23.32 kg/m².      Intake/Output Summary (Last 24 hours) at 1/27/2022 2116  Last data filed at 1/27/2022 1918  Gross per 24 hour   Intake --   Output 200 ml   Net -200 ml       Physical Exam  Constitutional:       Comments: Patient is intubated    HENT:      Head: Normocephalic.      Nose: Nose normal.   Eyes:      Extraocular Movements: Extraocular movements intact.      Pupils: Pupils are equal, round, and reactive to light.   Cardiovascular:      Rate and Rhythm: Normal rate and regular rhythm.      Pulses: Normal pulses.      Comments: VAD hum present  Pulmonary:      Effort: Pulmonary effort is normal.   Abdominal:      General: Abdomen is flat. Bowel sounds are normal.      Palpations: Abdomen is soft.   Musculoskeletal:         General: Normal range of motion.      Cervical back: Normal range of motion.   Skin:     General: Skin is warm.      Capillary Refill: Capillary refill takes less than 2 seconds.   Neurological:      Comments:  patient is intubated and response to commands         Significant Labs:  CBC:  Recent Labs   Lab 01/27/22 1914   WBC 10.41   RBC 3.62*   HGB 10.4*   HCT 34.2*      MCV 95   MCH 28.7   MCHC 30.4*     BNP:  No results for input(s): BNP in the last 168 hours.    Invalid input(s): BNPTRIAGELBLO  CMP:  No results for input(s): GLU, CALCIUM, ALBUMIN, PROT, NA, K, CO2, CL, BUN, CREATININE, ALKPHOS, ALT, AST, BILITOT in the last 168 hours.   Coagulation:   Recent Labs   Lab 01/25/22  0000 01/27/22 1914   INR 2.0 1.4*   APTT  --  25.7     LDH:  No results for input(s): LDH in the last 72 hours.  Microbiology:  Microbiology Results (last 7  days)     ** No results found for the last 168 hours. **          I have reviewed all pertinent labs within the past 24 hours.    Diagnostic Results:  I have reviewed and interpreted all pertinent imaging results/findings within the past 24 hours.    Assessment/Plan:     * Bilateral subdural hematomas  Patient had a traumatic subdural, subarachnoid hemorrhage  CT reviewed by Neurosurgery.  Recommended a repeat CT head  INR 1.4.  Patient has elevated maps above 90 on arrival.  Plan  Will keep him on Cardene to keep his maps below 90  Will hold aspirin, Coumadin for now.    LVAD (left ventricular assist device) present  Procedure: Device Interrogation Including analysis of device parameters  Current Settings: Ventricular Assist Device  Review of device function is stable/unstable stable  INR 1.4  LDH pending   Will hold coumadin and aspirin     TXP LVAD INTERROGATIONS 1/27/2022 1/27/2022 1/27/2022 1/6/2022 11/3/2021 9/22/2021 7/21/2021   Type HeartMate3 HeartMate3 HeartMate3 - - - -   Flow 3.3 3.3 3.4 - - - -   Speed 5000 5050 5050 - - - -   PI 10.3 10 9.8 - - - -   Power (Edwards) 3.5 3.5 3.2 - - - -   LSL 4600 4650 4600 - - - -   Pulsatility Pulse Pulse Pulse Intermittent pulse No Pulse Pulse Intermittent pulse       Type 2 diabetes mellitus without complication  SSI and ACHS     Respiratory failure  Patient is intubated for airway protection.  Currently minimal vent settings.    Essential hypertension  Will resume lisinopril.  Will also keep him Cardene drip to keep his maps below 90        Sean Campbell MD  Heart Transplant  [facility] - [facility]

## 2022-01-28 NOTE — ASSESSMENT & PLAN NOTE
-Intubated prior to transfer for respiratory distress  -Patient is currently on contact isolation for reported +ve Covid test at OSH prior to transfer. CXR unremarkable. Repeating Covid test  -Currently alert and on minimal vent settings. Awaiting NCC input regarding SBT/extubation

## 2022-01-28 NOTE — HPI
67 yo BM with stage D CHF due to NICMP s/p HM3, s/p ICD, HTN, HLD, T2DM was transferred from outside hospital emergency room after he was found to have subdural hematoma/subarachnoid hemorrhage.  Patient presented to the ER after he was found to have a syncopal event.  Also patient had respiratory distress on arrival for which he was intubated.  Patient was transferred here for further evaluation.  On arrival patient is intubated and is on propofol.  Neurosurgery and Neuro Critical Care were immediately informed.  Neurosurgery recommended to repeat CT after reviewing the prior CT done in the ER in [place].  INR on arrival is 1.6.  He also had a low flow with a increased PI around 4:00 p.m. this evening.  According to wife patient was complaining of headache for the last 2 weeks..  He went to post office this afternoon and she does not know what exactly happened..  ICD was interrogated and did not show any VT/VF.

## 2022-01-28 NOTE — CONSULTS
[facility] Int[facility]  Neurocritical Care  Consult    Admit Date: 1/27/2022  Service Date: 01/27/2022  Length of Stay: 0    Subjective:     Chief Complaint: Bilateral subdural hematomas    History of Present Illness: 67 yo M with HTN, HLD, T2DM, end stage heart failure s/p LVAD (heartmate 3) placed 1/13 presents to CCU after fall resulting in bilateral subdural hematomas. Patient presented to outside facility following a syncopal episode at home, outside CT head shows two acute on chronic subdural hematomas and subarachnoid hemorrhage with midline shift. It is unclear when the patient fell or how many times. Patient is admitted to CCU under care of heart transplant service. Neuro critical care was consulted due to bilateral SDH.      Past Medical History:   Diagnosis Date    CLARISSE (acute kidney injury) 1/11/2021    CHF (congestive heart failure)     Chronic combined systolic and diastolic congestive heart failure 1/9/2021    Coronary artery disease     Diabetes mellitus     Hypertension     Kidney stones      Past Surgical History:   Procedure Laterality Date    CARDIAC CATHETERIZATION  2010    no stents    CARDIAC DEFIBRILLATOR PLACEMENT  2010    CARDIAC DEFIBRILLATOR PLACEMENT      HERNIA REPAIR      IRRIGATION OF MEDIASTINUM N/A 1/14/2021    Procedure: IRRIGATION, MEDIASTINUM;  Surgeon: Zenon Corona MD;  Location: [facility] OR [address] [place] [address];  Service: Cardiovascular;  Laterality: N/A;    KNEE ARTHROSCOPY Right     LEFT VENTRICULAR ASSIST DEVICE Left 1/13/2021    Procedure: INSERTION- HeartMate 3 LEFT VENTRICULAR ASSIST DEVICE ;  Surgeon: Zenon Corona MD;  Location: [facility] OR [address] [place] [address];  Service: Cardiovascular;  Laterality: Left;    RECONSTRUCTION OF PERICARDIUM N/A 1/14/2021    Procedure: RECONSTRUCTION, PERICARDIUM;  Surgeon: Zenon Corona MD;  Location: [facility] OR [address] [place] [address];  Service: Cardiovascular;  Laterality: N/A;    RIGHT HEART CATHETERIZATION Right 11/2/2020    Procedure: INSERTION, CATHETER,  RIGHT HEART;  Surgeon: Deana Lake MD;  Location: [facility] CATH LAB;  Service: Cardiology;  Laterality: Right;    STERNAL WOUND CLOSURE  1/13/2021    Procedure: TEMPORARY CLOSURE OF CHEST;  Surgeon: Zenon Corona MD;  [facility];  Service: Cardiovascular;;    STERNAL WOUND CLOSURE N/A 1/14/2021    Procedure: CLOSURE, WOUND, STERNUM;  Surgeon: Zenon Corona MD;  [facility];  Service: Cardiovascular;  Laterality: N/A;      No current facility-administered medications on file prior to encounter.     Current Outpatient Medications on File Prior to Encounter   Medication Sig Dispense Refill    aspirin 325 MG tablet Take 1 tablet (325 mg total) by mouth once daily. 90 tablet 3    atorvastatin (LIPITOR) 80 MG tablet Take 1 tablet (80 mg total) by mouth once daily. 90 tablet 3    docusate sodium (COLACE) 100 MG capsule Take 100 mg by mouth Daily.      lisinopriL (PRINIVIL,ZESTRIL) 20 MG tablet Take 1/2 tablet (10 mg total) by mouth every morning and take 1 tablet (20 mg total) every evening. 90 tablet 2    magnesium oxide (MAG-OX) 400 mg (241.3 mg magnesium) tablet Take 1 tablet (400 mg total) by mouth 3 (three) times daily. 90 tablet 11    polyethylene glycol (GLYCOLAX) 17 gram PwPk Mix 1 packet (17 g) with liquid and take by mouth daily as needed. 10 packet 0    SITagliptin (JANUVIA) 100 MG Tab Take 1 tablet (100 mg total) by mouth once daily. 90 tablet 3    torsemide (DEMADEX) 20 MG Tab Take 2 tablets (40 mg total) by mouth once daily. 180 tablet 0    warfarin (COUMADIN) 5 MG tablet Take a half tablet (2.5mg) by mouth on 2/4, then take a full tablet (5mg) by mouth daily thereafter. 90 tablet 3      Allergies: Pcn [penicillins]    Family History   Problem Relation Age of Onset    Heart attack Mother     Heart failure Brother     Heart attack Brother     Hypertension Brother      Social History     Tobacco Use    Smoking status: Current Some Day Smoker     Types: Cigarettes     Smokeless tobacco: Never Used   Substance Use Topics    Alcohol use: No     Review of Systems   Unable to obtain 2/2 AMS  Objective:     Vitals:    Temp: 98.5 °F (36.9 °C)  Pulse: 96  Rhythm: normal sinus rhythm  BP: (!) 110/0  MAP (mmHg): 110  Resp: 18  SpO2: 100 %  Oxygen Concentration (%): 50  O2 Device (Oxygen Therapy): ventilator  Vent Mode: A/C  Set Rate: 18 BPM  Vt Set: 450 mL  PEEP/CPAP: 8 cmH20  Peak Airway Pressure: 20 cmH2O  Mean Airway Pressure: 11 cmH20  Plateau Pressure: 0 cmH20    Temp  Min: 98.5 °F (36.9 °C)  Max: 98.5 °F (36.9 °C)  Pulse  Min: 92  Max: 101  BP  Min: 105/76  Max: 111/89  MAP (mmHg)  Min: 97  Max: 110  Resp  Min: 17  Max: 18  SpO2  Min: 100 %  Max: 100 %  Oxygen Concentration (%)  Min: 50  Max: 50    No intake/output data recorded.           Physical Exam  --sedation: Propofol  --GCS: [id number]  --Mental Status: sleepy, opens eyes briefly to loud voice then closes again, no commands   --Pupils 3-->2mm, PERRL.   --brainstem: intact  --Motor: flicker movement to pain in all extremities  --sensory: see motor  --Reflexes: not tested  --Gait: deferred      Today I personally reviewed pertinent medications, lines/drains/airways, imaging, cardiology results, laboratory results, microbiology results,    Assessment/Plan:     Neuro  * Bilateral subdural hematomas  66yoM with CHF due to NICMP s/p LVAD 1/13 (heart mate 3), s/p ICD, on Coumadin & , CAD, HTN, HLD, T2DM presents for traumatic bilateral subdural hematomas    - Admit to CICU for LVAD  - Neuro checks Q1  - SBP< 140; MAP >65  - Hold AC/AP  - STAT CTH  - Coags & CBC  - NSGY following  - Decision on Anticoagulation based on CTH and coags  - EEG after CTH (conccern for NCSE)  - Further management per primary team        Activity Orders          Ambulate With Assistance starting at 01/28 0800    Bed rest starting at 01/27 1900    Diet NPO: NPO starting at 01/27 1900        Full Code    Edmond Riojas MD  Neurocritical  Care  [facility] - Cardiac Intensive Care

## 2022-01-28 NOTE — SUBJECTIVE & OBJECTIVE
Past Medical History:   Diagnosis Date    CLARISSE (acute kidney injury) 1/11/2021    CHF (congestive heart failure)     Chronic combined systolic and diastolic congestive heart failure 1/9/2021    Coronary artery disease     Diabetes mellitus     Hypertension     Kidney stones      Past Surgical History:   Procedure Laterality Date    CARDIAC CATHETERIZATION  2010    no stents    CARDIAC DEFIBRILLATOR PLACEMENT  2010    CARDIAC DEFIBRILLATOR PLACEMENT      HERNIA REPAIR      IRRIGATION OF MEDIASTINUM N/A 1/14/2021    Procedure: IRRIGATION, MEDIASTINUM;  Surgeon: Zenon Corona MD;  Location: [facility] OR [address] [place] [address];  Service: Cardiovascular;  Laterality: N/A;    KNEE ARTHROSCOPY Right     LEFT VENTRICULAR ASSIST DEVICE Left 1/13/2021    Procedure: INSERTION- HeartMate 3 LEFT VENTRICULAR ASSIST DEVICE ;  Surgeon: Zenon Corona MD;  [facility];  Service: Cardiovascular;  Laterality: Left;    RECONSTRUCTION OF PERICARDIUM N/A 1/14/2021    Procedure: RECONSTRUCTION, PERICARDIUM;  Surgeon: Zenon Corona MD;  Location: [facility] OR [address] [place] [address];  Service: Cardiovascular;  Laterality: N/A;    RIGHT HEART CATHETERIZATION Right 11/2/2020    Procedure: INSERTION, CATHETER, RIGHT HEART;  Surgeon: Deana Lake MD;  Location: [facility] CATH LAB;  Service: Cardiology;  Laterality: Right;    STERNAL WOUND CLOSURE  1/13/2021    Procedure: TEMPORARY CLOSURE OF CHEST;  Surgeon: Zenon Corona MD;  [facility];  Service: Cardiovascular;;    STERNAL WOUND CLOSURE N/A 1/14/2021    Procedure: CLOSURE, WOUND, STERNUM;  Surgeon: Zenon Corona MD;  [address];  Service: Cardiovascular;  Laterality: N/A;      No current facility-administered medications on file prior to encounter.     Current Outpatient Medications on File Prior to Encounter   Medication Sig Dispense Refill    aspirin 325 MG tablet Take 1 tablet (325 mg total) by mouth once daily. 90 tablet 3    atorvastatin (LIPITOR) 80 MG tablet  Take 1 tablet (80 mg total) by mouth once daily. 90 tablet 3    docusate sodium (COLACE) 100 MG capsule Take 100 mg by mouth Daily.      lisinopriL (PRINIVIL,ZESTRIL) 20 MG tablet Take 1/2 tablet (10 mg total) by mouth every morning and take 1 tablet (20 mg total) every evening. 90 tablet 2    magnesium oxide (MAG-OX) 400 mg (241.3 mg magnesium) tablet Take 1 tablet (400 mg total) by mouth 3 (three) times daily. 90 tablet 11    polyethylene glycol (GLYCOLAX) 17 gram PwPk Mix 1 packet (17 g) with liquid and take by mouth daily as needed. 10 packet 0    SITagliptin (JANUVIA) 100 MG Tab Take 1 tablet (100 mg total) by mouth once daily. 90 tablet 3    torsemide (DEMADEX) 20 MG Tab Take 2 tablets (40 mg total) by mouth once daily. 180 tablet 0    warfarin (COUMADIN) 5 MG tablet Take a half tablet (2.5mg) by mouth on 2/4, then take a full tablet (5mg) by mouth daily thereafter. 90 tablet 3      Allergies: Pcn [penicillins]    Family History   Problem Relation Age of Onset    Heart attack Mother     Heart failure Brother     Heart attack Brother     Hypertension Brother      Social History     Tobacco Use    Smoking status: Current Some Day Smoker     Types: Cigarettes    Smokeless tobacco: Never Used   Substance Use Topics    Alcohol use: No     Review of Systems   Unable to obtain 2/2 AMS  Objective:     Vitals:    Temp: 98.5 °F (36.9 °C)  Pulse: 96  Rhythm: normal sinus rhythm  BP: (!) 110/0  MAP (mmHg): 110  Resp: 18  SpO2: 100 %  Oxygen Concentration (%): 50  O2 Device (Oxygen Therapy): ventilator  Vent Mode: A/C  Set Rate: 18 BPM  Vt Set: 450 mL  PEEP/CPAP: 8 cmH20  Peak Airway Pressure: 20 cmH2O  Mean Airway Pressure: 11 cmH20  Plateau Pressure: 0 cmH20    Temp  Min: 98.5 °F (36.9 °C)  Max: 98.5 °F (36.9 °C)  Pulse  Min: 92  Max: 101  BP  Min: 105/76  Max: 111/89  MAP (mmHg)  Min: 97  Max: 110  Resp  Min: 17  Max: 18  SpO2  Min: 100 %  Max: 100 %  Oxygen Concentration (%)  Min: 50  Max: 50    No  intake/output data recorded.           Physical Exam  --sedation: Propofol  --GCS: [id number]  --Mental Status: sleepy, opens eyes briefly to loud voice then closes again, no commands   --Pupils 3-->2mm, PERRL.   --brainstem: intact  --Motor: flicker movement to pain in all extremities  --sensory: see motor  --Reflexes: not tested  --Gait: deferred      Today I personally reviewed pertinent medications, lines/drains/airways, imaging, cardiology results, laboratory results, microbiology results,

## 2022-01-28 NOTE — PROGRESS NOTES
Called by charge nurse Giovany to report pt doesn't have his emergency equipment.  I went in to bring emergency equipment for him.  Brought an emergency bag, 2 clips, 1 controller [id number] and 2 [id number].  Will talk with his family about bringing his equipment from home prior to discharge

## 2022-01-28 NOTE — HPI
66M PMHx cardiomyopathy, s/p LVAD on coumadin and CAD s/p stenting on ASA, presenting after fall, consulted to NSGY for SDH. Pt had syncopal event earlier today with prodrome of dizziness, fell, hit head, went to OSH for workup and was complaining of occipital H/A. At OSH, pt had acute neurologic decline, had to be intubated, and presents to [facility] intubated, sedated and with no motor activity. However, 2 hours after presentation to [facility], pt's neurologic status has improved to awake, alert, and following commands briskly with minor headache.

## 2022-01-28 NOTE — ASSESSMENT & PLAN NOTE
-Patient had a traumatic subdural, subarachnoid hemorrhage after fall on 1/27. Currently no focal deficits  -NCC and NSGY following. Plans for serial imagine pending  -INR 1.4 on admit, 1.3 this morning. Coumadin/ASA on hold  -Patient had elevated maps above 90 on arrival, but overnight required addition of low dose Levophed to keep MAP > 65  -Goal NA+ > 135, 133 this morning. Awaiting NCC input  -Keppra started as AED. EEG pending  -Likely will require angiogram per NSGY

## 2022-01-28 NOTE — ASSESSMENT & PLAN NOTE
66M PMHx cardiomyopathy, s/p LVAD on coumadin and CAD s/p stenting on ASA, presenting after syncopal event and fall with hitting head, found to have bilateral SDH R>L on CT at OSH, rpt CT with new cisternal SAH    -- CICU primary  -- q1 hour vitals/neurochecks  -- SBP < 140  -- Na > 135  -- Keppra 500 BID  -- INR 1.6   -- Hold ASA/coumadin: no need for reversal at this time  -- All imaging reviewed   -- CTH 1/27 OSH: R > L SDH, no midline shift, no hydrocephalus, cisterns open   -- CTH 1/27 rpt: new cisternal SAH, no hydrocephalus   -- CTA stat  -- Recommend seizure w/u per NCC  -- Further recs to follow CTA

## 2022-01-28 NOTE — PROGRESS NOTES
[facility]  Heart Transplant  Progress Note    Patient Name: Rocco France  MRN: [id number]  Admission Date: 1/27/2022  Hospital Length of Stay: 1 days  Attending Physician: Deana Lake MD  Primary Care Provider: Teresa Mendoza NP  Principal Problem:Bilateral subdural hematomas    Subjective:     **Interval History: Intubated, off sedation. Nods head appropriately to questions and follows commands. MAEE. INR 1.3, Coumadin and ASA on hold. On low dose Levo to keep MAP > 65. All home antihypertensives are on hold. Na+ 133 with goal > 135. Contacted NCC regarding plan for serial imaging and need for ongoing intubation - to round and f/u with us. Patient is currently on contact isolation for reported +ve Covid test at OSH prior to transfer. CXR unremarkable. Repeating Covid test    Continuous Infusions:   sodium chloride 0.9% 5 mL/hr at 01/28/22 0900    NORepinephrine bitartrate-D5W 0.008 mcg/kg/min (01/28/22 0900)    propofoL       Scheduled Meds:   atorvastatin  80 mg Oral Daily    chlorhexidine  15 mL Mouth/Throat BID    famotidine (PF)  20 mg Intravenous BID    levetiracetam IV  500 mg Intravenous Q12H     PRN Meds:sodium chloride, dextrose 50%, glucagon (human recombinant), insulin aspart U-100    Review of patient's allergies indicates:   Allergen Reactions    Pcn [penicillins] Hives     Objective:     Vital Signs (Most Recent):  Temp: 98.5 °F (36.9 °C) (01/28/22 0715)  Pulse: 82 (01/28/22 0919)  Resp: (!) 21 (01/28/22 0919)  BP: (!) 90/0 (01/28/22 0900)  SpO2: 100 % (01/28/22 0919) Vital Signs (24h Range):  Temp:  [97 °F (36.1 °C)-98.5 °F (36.9 °C)] 98.5 °F (36.9 °C)  Pulse:  [] 82  Resp:  [17-24] 21  SpO2:  [85 %-100 %] 100 %  BP: ()/(0-89) 90/0  Arterial Line BP: ()/(50-89) 89/67     Patient Vitals for the past 72 hrs (Last 3 readings):   Weight   01/27/22 2000 82.4 kg (181 lb 10.5 oz)     Body mass index is 23.32 kg/m².      Intake/Output Summary (Last 24  hours) at 1/28/2022 0946  Last data filed at 1/28/2022 0900  Gross per 24 hour   Intake 304.69 ml   Output 840 ml   Net -535.31 ml       Hemodynamic Parameters:       Telemetry: SR    Physical Exam  Constitutional:       Appearance: Normal appearance.      Comments: Intubated   HENT:      Head: Normocephalic and atraumatic.   Eyes:      Conjunctiva/sclera: Conjunctivae normal.      Pupils: Pupils are equal, round, and reactive to light.   Neck:      Comments: Neck veins are not elevated  Cardiovascular:      Rate and Rhythm: Normal rate and regular rhythm.      Comments: Smooth VAD hum  Pulmonary:      Effort: Pulmonary effort is normal.      Breath sounds: Normal breath sounds.      Comments: Intubated, currently not sedated  Abdominal:      General: Bowel sounds are normal.      Palpations: Abdomen is soft.   Musculoskeletal:         General: No swelling. Normal range of motion.      Cervical back: Normal range of motion and neck supple.   Skin:     General: Skin is warm and dry.      Capillary Refill: Capillary refill takes 2 to 3 seconds.   Neurological:      Mental Status: He is alert.      Comments: Intubated, not currently sedated. Alert, nodes head appropriately to questions and follows commands. MAEE         Significant Labs:  CBC:  Recent Labs   Lab 01/27/22 1914 01/28/22  0816   WBC 10.41 8.14   RBC 3.62* 3.44*   HGB 10.4* 9.8*   HCT 34.2* 31.4*    181   MCV 95 91   MCH 28.7 28.5   MCHC 30.4* 31.2*     BNP:  Recent Labs   Lab 01/27/22 2055   *     CMP:  Recent Labs   Lab 01/27/22 2055 01/28/22  0816   * 101   CALCIUM 9.0 9.0   ALBUMIN 2.6*  --    PROT 8.7*  --    * 133*   K 4.6 4.0   CO2 22* 24    101   BUN 33* 33*   CREATININE 1.3 1.2   ALKPHOS 120  --    ALT 16  --    AST 38  --    BILITOT 1.6*  --       Coagulation:   Recent Labs   Lab 01/25/22  0000 01/27/22 1914 01/27/22 2055   INR 2.0 1.4* 1.3*   APTT  --  25.7 31.8     LDH:  Recent Labs   Lab 01/27/22 2055         Microbiology:  Microbiology Results (last 7 days)     ** No results found for the last 168 hours. **          I have reviewed all pertinent labs within the past 24 hours.    Estimated Creatinine Clearance: 70.4 mL/min (based on SCr of 1.2 mg/dL).    Diagnostic Results:  I have reviewed all pertinent imaging results/findings within the past 24 hours.    Assessment and Plan:     65 yo BM with stage D CHF due to NICMP s/p HM3, s/p ICD, HTN, HLD, T2DM was transferred from outside hospital emergency room after he was found to have subdural hematoma/subarachnoid hemorrhage.  Patient presented to the ER after he was found to have a syncopal event.  Also patient had respiratory distress on arrival for which he was intubated.  Patient was transferred here for further evaluation.  On arrival patient is intubated and is on propofol.  Neurosurgery and Neuro Critical Care were immediately informed.  Neurosurgery recommended to repeat CT after reviewing the prior CT done in the ER in [place].  INR on arrival is 1.6.  He also had a low flow with a increased PI around 4:00 p.m. this evening.  According to wife patient was complaining of headache for the last 2 weeks..  He went to post office this [place] and she does not know what exactly happened..  ICD was interrogated and did not show any VT/VF.       * Bilateral subdural hematomas  -Patient had a traumatic subdural, subarachnoid hemorrhage after fall on 1/27. Currently no focal deficits  -NCC and NSGY following. Plans for serial imagine pending  -INR 1.4 on admit, 1.3 this morning. Coumadin/ASA on hold  -Patient had elevated maps above 90 on arrival, but overnight required addition of low dose Levophed to keep MAP > 65  -Goal NA+ > 135, 133 this morning. Awaiting NCC input  -Keppra started as AED. EEG pending  -Likely will require angiogram per NSGY    Acute respiratory failure requiring reintubation  -Intubated prior to transfer for respiratory  distress  -Patient is currently on contact isolation for reported +ve Covid test at OSH prior to transfer. CXR unremarkable. Repeating Covid test  -Currently alert and on minimal vent settings. Awaiting NCC input regarding SBT/extubation    LVAD (left ventricular assist device) present  -S/P DT HM3 1/13/21  -Current speed 5000  -INR goal 2.0-3.0. Current INR 1.3. Coumadin and ASA remain on hold  -LDH stable    Procedure: Device Interrogation Including analysis of device parameters  Current Settings: Ventricular Assist Device  Review of device function is stable/unstable stable         TXP LVAD INTERROGATIONS 1/28/2022 1/28/2022 1/28/2022 1/28/2022 1/28/2022 1/28/2022 1/28/2022   Type HeartMate3 HeartMate3 HeartMate3 HeartMate3 HeartMate3 HeartMate3 HeartMate3   Flow 3.6 3.9 3.8 3.8 3.8 3.7 3.6   Speed 5050 5000 5000 5000 5000 5000 5000   PI 6.4 5.8 5.8 6.4 6 6.2 1.9   Power (Edwards) 3.3 3.4 3.4 3.4 3.4 3.3 3.2   LSL 4600 4600 4600 4600 4600 4600 4600   Pulsatility Intermittent pulse Intermittent pulse Intermittent pulse Intermittent pulse Intermittent pulse Intermittent pulse Intermittent pulse       Type 2 diabetes mellitus without complication  -SSI  ACHS     Essential hypertension  -Initially thought to need Cardene, but he is now on low dose Levo to keep MAP > 65  -All home antihypertensives are on hold for now      Uninterrupted Critical Care/Counseling Time (not including procedures): 60 minutes      Viviana Pabon NP [id number]  Heart Transplant  [facility] - [facility]

## 2022-01-28 NOTE — PROCEDURES
"Rocco France is a 66 y.o. male patient.    Temp: 97 °F (36.1 °C) (01/27/22 2000)  Pulse: 87 (01/27/22 2200)  Resp: 18 (01/27/22 1822)  BP: (!) 122/57 (01/27/22 2200)  SpO2: 100 % (01/27/22 2200)  Weight: 82.4 kg (181 lb 10.5 oz) (01/27/22 2000)  Height: 6' 2" (188 cm) (01/27/22 2000)    Arterial Line    Date/Time: 1/27/2022 10:09 PM  Location procedure was performed: [facility] HEART TRANSPLANT  Performed by: Sean Campbell MD  Authorized by: Sean Campbell MD   Indications: hemodynamic monitoring  Location: left radial    Patient sedated: yes  Sedatives: propofol  Number of attempts: 1  Estimated blood loss (mL): 1  Post-procedure: line sutured and dressing applied  Post-procedure CMS: normal  Patient tolerance: Patient tolerated the procedure well with no immediate complications          TXP LVAD INTERROGATIONS 1/27/2022 1/27/2022 1/27/2022 1/27/2022 1/27/2022 1/6/2022 11/3/2021   Type HeartMate3 HeartMate3 HeartMate3 HeartMate3 HeartMate3 - -   Flow 3.3 3.3 3.3 3.3 3.4 - -   Speed 5000 5000 5000 5050 5050 - -   PI 8.8 8.8 10.3 10 9.8 - -   Power (Edwards) 3.4 3.3 3.5 3.5 3.2 - -   LSL 4600 4600 4600 4650 4600 - -   Pulsatility Pulse Pulse Pulse Pulse Pulse Intermittent pulse No Pulse         1/27/2022  "

## 2022-01-28 NOTE — CONSULTS
[facility] - Cardiac Intensive Care  Neurosurgery  Consult Note    Subjective:     History of Present Illness: 66M PMHx cardiomyopathy, s/p LVAD on coumadin and CAD s/p stenting on ASA, presenting after fall, consulted to NSGY for SDH. Pt had syncopal event earlier today with prodrome of dizziness, fell, hit head, went to OSH for workup and was complaining of occipital H/A. At OSH, pt had acute neurologic decline, had to be intubated, and presents to [facility] intubated, sedated and with no motor activity. However, 2 hours after presentation to [facility], pt's neurologic status has improved to awake, alert, and following commands briskly with minor headache.       Post-Op Info:  * No surgery found *         No new subjective & objective note has been filed under this hospital service since the last note was generated.    Assessment/Plan:     Subarachnoid hemorrhage  66M PMHx cardiomyopathy, s/p LVAD on coumadin and CAD s/p stenting on ASA, presenting after syncopal event and fall with hitting head, found to have bilateral SDH R>L on CT at OSH, rpt CT with new cisternal SAH, negative aneurysm or vascular malformation on CTA, likely nonaneurysmal perimesencephalic SAH    -- CICU primary  -- q1 hour vitals/neurochecks  -- SBP < 140  -- Na > 135  -- Keppra 500 BID  -- INR 1.6   -- Hold ASA/coumadin: no need for reversal at this time  -- All imaging reviewed   -- CTH 1/27 OSH: bilat aucte on chronic SDH, R > L, no shift, no hydro, cisterns open.    -- CTH 1/27 rpt: new SAH in interpeduncular and quadrigeminal cisterns   -- CTA 1/27: negative for aneurysm or vascular malformation   -- Likely will require angiogram   -- 3T on arrival, progressed to awake, alert and full strength, concern for seizures or postictal state at presentation   -- workup per NCC   -- AEDs per NCC       Ace Robertson MD  Neurosurgery  [facility] - [facility]

## 2022-01-28 NOTE — NURSING
Patient extubated with Respiratory Therapy team per orders by JOSAFAT Pabon. Pt tolerated procedure well. Placed on nasal cannula at 3LPM. No shortness of breath noted by patient, no signs of respiratory distress. Will continue to monitor.    Jean-Claude Gatica RN

## 2022-01-28 NOTE — PLAN OF CARE
Covid +ve. Discussed with Dr. Montserrat Schwarz in ID. Will treat with Remdesivir to prevent the progression to severe Covid 19

## 2022-01-28 NOTE — NURSING
Per Cm, CNP, covid swab ordered to confirm covid positive status. Verbal order given to perform spontaneous breathing trial. Will notify respiratory therapy team and continue to monitor.     Jean-Claude Gatica RN

## 2022-01-28 NOTE — ASSESSMENT & PLAN NOTE
-S/P DT HM3 1/13/21  -Current speed 5000  -INR goal 2.0-3.0. Current INR 1.3. Coumadin and ASA remain on hold  -LDH stable    Procedure: Device Interrogation Including analysis of device parameters  Current Settings: Ventricular Assist Device  Review of device function is stable/unstable stable         TXP LVAD INTERROGATIONS 1/28/2022 1/28/2022 1/28/2022 1/28/2022 1/28/2022 1/28/2022 1/28/2022   Type HeartMate3 HeartMate3 HeartMate3 HeartMate3 HeartMate3 HeartMate3 HeartMate3   Flow 3.6 3.9 3.8 3.8 3.8 3.7 3.6   Speed 5050 5000 5000 5000 5000 5000 5000   PI 6.4 5.8 5.8 6.4 6 6.2 1.9   Power (Edwards) 3.3 3.4 3.4 3.4 3.4 3.3 3.2   LSL 4600 4600 4600 4600 4600 4600 4600   Pulsatility Intermittent pulse Intermittent pulse Intermittent pulse Intermittent pulse Intermittent pulse Intermittent pulse Intermittent pulse

## 2022-01-28 NOTE — NURSING
Patient is somnolent, propofol was turned off at shift change. Patient follows command, no complains of headache. Informed patient that he will be put on soft wrist restraints to prevent himself from pulling the ET tube. Patient nod yes.

## 2022-01-28 NOTE — ASSESSMENT & PLAN NOTE
66M PMHx cardiomyopathy, s/p LVAD on coumadin and CAD s/p stenting on ASA, presenting after syncopal event and fall with hitting head, found to have bilateral SDH R>L on CT at OSH, rpt CT with new cisternal SAH, negative aneurysm or vascular malformation on CTA, likely nonaneurysmal perimesencephalic SAH    -- CICU primary  -- q1 hour vitals/neurochecks  -- SBP < 140  -- Na > 135  -- Keppra 500 BID  -- INR 1.6 >> 1.3   -- Hold ASA/coumadin: no need for reversal at this time  -- All imaging reviewed   -- CTH 1/27 OSH: bilat aucte on chronic SDH, R > L, no shift, no hydro, cisterns open.    -- CTH 1/27 rpt: new SAH in interpeduncular and quadrigeminal cisterns   -- CTA 1/27: negative for aneurysm or vascular malformation   -- No recommendation for angiogram at this time, SAH likely related to venous plexus bleed  -- 3T on arrival, progressed to awake, alert and full strength, concern for seizures or postictal state at presentation   -- workup per NCC   -- AEDs per NCC

## 2022-01-28 NOTE — SUBJECTIVE & OBJECTIVE
Past Medical History:   Diagnosis Date    CLARISSE (acute kidney injury) 1/11/2021    CHF (congestive heart failure)     Chronic combined systolic and diastolic congestive heart failure 1/9/2021    Coronary artery disease     Diabetes mellitus     Hypertension     Kidney stones        Past Surgical History:   Procedure Laterality Date    CARDIAC CATHETERIZATION  2010    no stents    CARDIAC DEFIBRILLATOR PLACEMENT  2010    CARDIAC DEFIBRILLATOR PLACEMENT      HERNIA REPAIR      IRRIGATION OF MEDIASTINUM N/A 1/14/2021    Procedure: IRRIGATION, MEDIASTINUM;  Surgeon: Zenon Corona MD;  [facility];  Service: Cardiovascular;  Laterality: N/A;    KNEE ARTHROSCOPY Right     LEFT VENTRICULAR ASSIST DEVICE Left 1/13/2021    Procedure: INSERTION- HeartMate 3 LEFT VENTRICULAR ASSIST DEVICE ;  Surgeon: Zenon Corona MD;  [facility];  Service: Cardiovascular;  Laterality: Left;    RECONSTRUCTION OF PERICARDIUM N/A 1/14/2021    Procedure: RECONSTRUCTION, PERICARDIUM;  Surgeon: Zenon Corona MD;  [facility];  Service: Cardiovascular;  Laterality: N/A;    RIGHT HEART CATHETERIZATION Right 11/2/2020    Procedure: INSERTION, CATHETER, RIGHT HEART;  Surgeon: Deana Lake MD;  Location: [facility] CATH LAB;  Service: Cardiology;  Laterality: Right;    STERNAL WOUND CLOSURE  1/13/2021    Procedure: TEMPORARY CLOSURE OF CHEST;  Surgeon: Zenon Corona MD;  [facility];  Service: Cardiovascular;;    STERNAL WOUND CLOSURE N/A 1/14/2021    Procedure: CLOSURE, WOUND, STERNUM;  Surgeon: Zenon Corona MD;  [facility];  Service: Cardiovascular;  Laterality: N/A;       Review of patient's allergies indicates:   Allergen Reactions    Pcn [penicillins] Hives       Current Facility-Administered Medications   Medication    0.9%  NaCl infusion (for blood administration)    [START ON 1/28/2022] atorvastatin tablet 80 mg    chlorhexidine 0.12 % solution 15 mL     famotidine (PF) injection 20 mg    lisinopriL tablet 20 mg    propofol (DIPRIVAN) 10 mg/mL infusion     Family History     Problem Relation (Age of Onset)    Heart attack Mother, Brother    Heart failure Brother    Hypertension Brother        Tobacco Use    Smoking status: Current Some Day Smoker     Types: Cigarettes    Smokeless tobacco: Never Used   Substance and Sexual Activity    Alcohol use: No    Drug use: Not on file    Sexual activity: Not on file     Review of Systems   Unable to perform ROS: Intubated     Objective:     Vital Signs (Most Recent):  Temp: 97 °F (36.1 °C) (01/27/22 2000)  Pulse: 93 (01/27/22 2000)  Resp: 18 (01/27/22 1822)  BP: (!) 110/0 (01/27/22 1915)  SpO2: 100 % (01/27/22 2000) Vital Signs (24h Range):  Temp:  [97 °F (36.1 °C)-98.5 °F (36.9 °C)] 97 °F (36.1 °C)  Pulse:  [] 93  Resp:  [17-18] 18  SpO2:  [99 %-100 %] 100 %  BP: (105-111)/(0-89) 110/0     Patient Vitals for the past 72 hrs (Last 3 readings):   Weight   01/27/22 2000 82.4 kg (181 lb 10.5 oz)     Body mass index is 23.32 kg/m².      Intake/Output Summary (Last 24 hours) at 1/27/2022 2116  Last data filed at 1/27/2022 1918  Gross per 24 hour   Intake --   Output 200 ml   Net -200 ml       Physical Exam  Constitutional:       Comments: Patient is intubated    HENT:      Head: Normocephalic.      Nose: Nose normal.   Eyes:      Extraocular Movements: Extraocular movements intact.      Pupils: Pupils are equal, round, and reactive to light.   Cardiovascular:      Rate and Rhythm: Normal rate and regular rhythm.      Pulses: Normal pulses.      Comments: VAD hum present  Pulmonary:      Effort: Pulmonary effort is normal.   Abdominal:      General: Abdomen is flat. Bowel sounds are normal.      Palpations: Abdomen is soft.   Musculoskeletal:         General: Normal range of motion.      Cervical back: Normal range of motion.   Skin:     General: Skin is warm.      Capillary Refill: Capillary refill takes less than  2 seconds.   Neurological:      Comments:  patient is intubated and response to commands         Significant Labs:  CBC:  Recent Labs   Lab 01/27/22 1914   WBC 10.41   RBC 3.62*   HGB 10.4*   HCT 34.2*      MCV 95   MCH 28.7   MCHC 30.4*     BNP:  No results for input(s): BNP in the last 168 hours.    Invalid input(s): BNPTRIAGELBLO  CMP:  No results for input(s): GLU, CALCIUM, ALBUMIN, PROT, NA, K, CO2, CL, BUN, CREATININE, ALKPHOS, ALT, AST, BILITOT in the last 168 hours.   Coagulation:   Recent Labs   Lab 01/25/22  0000 01/27/22 1914   INR 2.0 1.4*   APTT  --  25.7     LDH:  No results for input(s): LDH in the last 72 hours.  Microbiology:  Microbiology Results (last 7 days)     ** No results found for the last 168 hours. **          I have reviewed all pertinent labs within the past 24 hours.    Diagnostic Results:  I have reviewed and interpreted all pertinent imaging results/findings within the past 24 hours.

## 2022-01-28 NOTE — PLAN OF CARE
Patient is off of propofol at shift change. Responds to voice, sleeping between care.  Following commands. Complains of on and off headache 1-2/10.   CT Head and CTA of Head and Neck done.   Loading dose of Keppra given.   ET 8Fr at 24cm lips, AC/VC FiO2 35%, PEEP 8, vt 450, Rate 18.   Started on Levophed at 0.008- 0.0085, MAP drops to 50s when sleeping.   Salinas in place, salinas care given. UO 50cc/hr.   Continue Neuro checks q1.

## 2022-01-28 NOTE — ASSESSMENT & PLAN NOTE
Patient had a traumatic subdural, subarachnoid hemorrhage  CT reviewed by Neurosurgery.  Recommended a repeat CT head  INR 1.4.  Patient has elevated maps above 90 on arrival.  Plan  Will keep him on Cardene to keep his maps below 90  Will hold aspirin, Coumadin for now.

## 2022-01-28 NOTE — PROCEDURES
[facility]     PRELIMINARY C-EEG REPORT:  Review: 1/28/2022, 13:35 (start) - 17:52     After extubation, symmetric low amplitude (<30 uV) alpha background with 9 Hz posterior dominant rhythm is noted when awake        Symmetric sleep was also seen.        Findings are suggestive of normal EEG during the period of review.     Full report to follow.  Will continue to monitor.     Thank you for involving us in the care of this patient.  Pamella Izaguirre MD, AUSTIN(), DENEEN, CHARISSA.  Neurology-Epilepsy.  [facility].

## 2022-01-28 NOTE — PROGRESS NOTES
[facility] - Cardiac Intensive Care  Neurocritical Care  Progress Note    Admit Date: 1/27/2022  Service Date: 01/28/2022  Length of Stay: 1    Subjective:     Chief Complaint: Bilateral subdural hematomas    History of Present Illness: 65 yo M with HTN, HLD, T2DM, end stage heart failure s/p LVAD (heartmate 3) placed 1/13 presents to CCU after fall resulting in bilateral subdural hematomas. Patient presented to outside facility following a syncopal episode at home, outside CT head shows two acute on chronic subdural hematomas and subarachnoid hemorrhage with midline shift. It is unclear when the patient fell or how many times. Patient is admitted to CCU under care of heart transplant service. Neuro critical care was consulted due to bilateral SDH.    Interval: Exam significantly improved today; evidence of new SAH & previous SDH.    Review of Systems   Unable to obtain 2/2 AMS  Objective:      Vitals:  Vitals:    01/28/22 1000 01/28/22 1100 01/28/22 1153 01/28/22 1300   BP: (!) 78/0      BP Location: Right arm      Patient Position: Lying      Pulse: 80 80 88 81   Resp: 18 18 (!) 32 13   Temp:       TempSrc:       SpO2: 100% 97% 97% 98%   Weight:       Height:              Physical Exam  --sedation: off  --GCS: [id number]  --Mental Status: eyes open, follows commands   --Pupils 3-->2mm, PERRL.   --brainstem: intact  --Motor: moves all extremities spontaneously   --sensory: see motor  --Reflexes: not tested  --Gait: deferred        Today I personally reviewed pertinent medications, lines/drains/airways, imaging, cardiology results, laboratory results, microbiology results,     Assessment/Plan:      Neuro  * Bilateral subdural hematomas  66yoM with CHF due to NICMP s/p LVAD 1/13 (heart mate 3), s/p ICD, on Coumadin & , CAD, HTN, HLD, T2DM presents for traumatic bilateral subdural hematomas & SAH     - Admit to CICU for LVAD  - Neuro checks Q1  - SBP< 140; MAP >65  - Na+ goal Eunatremia -> Na low -> urine studies  unremarkable -> 3% to achieve goal + strict I&O  - Recommend IR to exclude aneurysmal SAH  - Hold AC/AP  - Coags & CBC  - NSGY following  - EEG  - Continue Keppra for now  - Repeat CTH tomorrow  - Further management per primary team      Activity Orders          Ambulate With Assistance starting at 01/28 0800    Turn patient every 2 hours starting at 01/28 0200    Bed rest starting at 01/27 1900    Diet NPO: NPO starting at 01/27 1900        Full Code    Edmond Riojas MD  Neurocritical Care  [facility]

## 2022-01-28 NOTE — PROGRESS NOTES
[facility] - Cardiac Intensive Care  Neurosurgery  Progress Note    Subjective:     History of Present Illness: 66M PMHx cardiomyopathy, s/p LVAD on coumadin and CAD s/p stenting on ASA, presenting after fall, consulted to NSGY for SDH. Pt had syncopal event earlier today with prodrome of dizziness, fell, hit head, went to OSH for workup and was complaining of occipital H/A. At OSH, pt had acute neurologic decline, had to be intubated, and presents to [facility] intubated, sedated and with no motor activity. However, 2 hours after presentation to [facility], pt's neurologic status has improved to awake, alert, and following commands briskly with minor headache.       Post-Op Info:  * No surgery found *         Interval History: Exam improved yesterday from 3T to awake, alert and oriented and able to answer question by nodding or shaking head. No hydrocephalus or aneurysm noted on rpt CTH/CTA    Medications:  Continuous Infusions:   sodium chloride 0.9% 5 mL/hr at 01/28/22 1000    NORepinephrine bitartrate-D5W 0.008 mcg/kg/min (01/28/22 1000)    propofoL      sodium chloride 3%       Scheduled Meds:   atorvastatin  80 mg Oral Daily    chlorhexidine  15 mL Mouth/Throat BID    famotidine (PF)  20 mg Intravenous BID    levetiracetam IV  500 mg Intravenous Q12H    remdesivir loading dose infusion  200 mg Intravenous Q24H    Followed by    [START ON 1/29/2022] remdesivir infusion  100 mg Intravenous Daily     PRN Meds:sodium chloride, dextrose 50%, glucagon (human recombinant), insulin aspart U-100     Review of Systems  Objective:     Weight: 82.4 kg (181 lb 10.5 oz)  Body mass index is 23.32 kg/m².  Vital Signs (Most Recent):  Temp: 98.5 °F (36.9 °C) (01/28/22 0715)  Pulse: 80 (01/28/22 1000)  Resp: 18 (01/28/22 1000)  BP: (!) 78/0 (01/28/22 1000)  SpO2: 100 % (01/28/22 1000) Vital Signs (24h Range):  Temp:  [97 °F (36.1 °C)-98.5 °F (36.9 °C)] 98.5 °F (36.9 °C)  Pulse:  [] 80  Resp:  [17-24] 18  SpO2:  [85 %-100  "%] 100 %  BP: ()/(0-89) 78/0  Arterial Line BP: ()/(50-89) 77/61     Date 01/28/22 0700 - 01/29/22 0659   Shift 6317-0046 4126-2986 7210-3696 24 Hour Total   INTAKE   I.V.(mL/kg) 65.5(0.8)   65.5(0.8)   IV Piggyback 246.7   246.7   Shift Total(mL/kg) 312.2(3.8)   312.2(3.8)   OUTPUT   Urine(mL/kg/hr) 185   185   Shift Total(mL/kg) 185(2.2)   185(2.2)   Weight (kg) 82.4 82.4 82.4 82.4              Vent Mode: Spont  Oxygen Concentration (%):  [35-50] 35  Resp Rate Total:  [9.8 br/min-22 br/min] 18 br/min  Vt Set:  [450 mL] 450 mL  PEEP/CPAP:  [5 cmH20-8 cmH20] 5 cmH20  Pressure Support:  [10 cmH20] 10 cmH20  Mean Airway Pressure:  [8.1 pcD54-92 cmH20] 8.1 cmH20         NG/OG Tube 01/27/22 2140 18 Fr. Center mouth (Active)   $ NG/OG Tube Placement Complete 01/27/22 2130   Placement Check placement verified by x-ray;placement verified by aspirate characteristics 01/27/22 2309   Tolerance no signs/symptoms of discomfort 01/27/22 2309   Securement secured to commercial device 01/27/22 2309   Clamp Status/Tolerance clamped 01/27/22 2309   Suction Setting/Drainage Method suction at the bedside 01/27/22 2309   Insertion Site Appearance no redness, warmth, tenderness, skin breakdown, drainage 01/27/22 2309   Flush/Irrigation flushed w/;water;no resistance met 01/27/22 2309            Urethral Catheter 01/27/22 1858 (Active)   $ Duarte Insertion Complete 01/27/22 1947   Site Assessment Clean;Intact 01/28/22 0300   Collection Container Urimeter 01/28/22 0300   Securement Method secured to top of thigh w/ adhesive device 01/28/22 0300   Catheter Care Performed no 01/28/22 0300   Reason for Continuing Urinary Catheterization Critically ill in ICU and requiring hourly monitoring of intake/output 01/28/22 0300   CAUTI Prevention Bundle StatLock in place w 1" slack;Intact seal between catheter & drainage tubing;Drainage bag/urimeter off the floor;Sheeting clip in use;No dependent loops or kinks;Drainage bag/urimeter not " overfilled (<2/3 full);Drainage bag/urimeter below bladder 01/28/22 0300   Output (mL) 55 mL 01/28/22 1000       Physical Exam:    Constitutional: He appears well-nourished. No distress.     Eyes: Pupils are equal, round, and reactive to light. EOM are normal.     Cardiovascular: Normal rate and regular rhythm.     Abdominal: Soft.     Psych/Behavior: He is alert.     E4VtM6  Alert  Answers questions appropriately by nodding/shaking head  PERRL  EOMI  Face Symmetric  BUE 5/5  BLE 5/5  No drift      Significant Labs:  Recent Labs   Lab 01/27/22 2055 01/28/22  0816   * 101   * 133*   K 4.6 4.0    101   CO2 22* 24   BUN 33* 33*   CREATININE 1.3 1.2   CALCIUM 9.0 9.0   MG 2.0 1.9     Recent Labs   Lab 01/27/22 1914 01/28/22  0816   WBC 10.41 8.14   HGB 10.4* 9.8*   HCT 34.2* 31.4*    181     Recent Labs   Lab 01/27/22 1914 01/27/22 2055   INR 1.4* 1.3*   APTT 25.7 31.8     Microbiology Results (last 7 days)     ** No results found for the last 168 hours. **        All pertinent labs from the last 24 hours have been reviewed.    Significant Diagnostics:  I have reviewed and interpreted all pertinent imaging results/findings within the past 24 hours.    Assessment/Plan:     Subarachnoid hemorrhage  66M PMHx cardiomyopathy, s/p LVAD on coumadin and CAD s/p stenting on ASA, presenting after syncopal event and fall with hitting head, found to have bilateral SDH R>L on CT at OSH, rpt CT with new cisternal SAH, negative aneurysm or vascular malformation on CTA, likely nonaneurysmal perimesencephalic SAH    -- CICU primary  -- q1 hour vitals/neurochecks  -- SBP < 140  -- Na > 135  -- Keppra 500 BID  -- INR 1.6 >> 1.3   -- Hold ASA/coumadin: no need for reversal at this time  -- All imaging reviewed   -- CTH 1/27 OSH: bilat aucte on chronic SDH, R > L, no shift, no hydro, cisterns open.    -- CTH 1/27 rpt: new SAH in interpeduncular and quadrigeminal cisterns   -- CTA 1/27: negative for aneurysm or  vascular malformation   -- No recommendation for angiogram at this time, SAH likely related to venous plexus bleed  -- 3T on arrival, progressed to awake, alert and full strength, concern for seizures or postictal state at presentation   -- workup per [facility]   -- AEDs per [facility]         Ace Robertson MD  Neurosurgery  [facility] - [facility]

## 2022-01-28 NOTE — ASSESSMENT & PLAN NOTE
Procedure: Device Interrogation Including analysis of device parameters  Current Settings: Ventricular Assist Device  Review of device function is stable/unstable stable  INR 1.4  LDH pending   Will hold coumadin and aspirin     TXP LVAD INTERROGATIONS 1/27/2022 1/27/2022 1/27/2022 1/6/2022 11/3/2021 9/22/2021 7/21/2021   Type HeartMate3 HeartMate3 HeartMate3 - - - -   Flow 3.3 3.3 3.4 - - - -   Speed 5000 5050 5050 - - - -   PI 10.3 10 9.8 - - - -   Power (Edwards) 3.5 3.5 3.2 - - - -   LSL 4600 4650 4600 - - - -   Pulsatility Pulse Pulse Pulse Intermittent pulse No Pulse Pulse Intermittent pulse

## 2022-01-28 NOTE — SUBJECTIVE & OBJECTIVE
**Interval History: Intubated, off sedation. Nods head appropriately to questions and follows commands. MAEE. INR 1.3, Coumadin and ASA on hold. On low dose Levo to keep MAP > 65. All home antihypertensives are on hold. Na+ 133 with goal > 135. Contacted [facility] regarding plan for serial imaging and need for ongoing intubation - to round and f/u with us. Patient is currently on contact isolation for reported +ve Covid test at OSH prior to transfer. CXR unremarkable. Repeating Covid test    Continuous Infusions:   sodium chloride 0.9% 5 mL/hr at 01/28/22 0900    NORepinephrine bitartrate-D5W 0.008 mcg/kg/min (01/28/22 0900)    propofoL       Scheduled Meds:   atorvastatin  80 mg Oral Daily    chlorhexidine  15 mL Mouth/Throat BID    famotidine (PF)  20 mg Intravenous BID    levetiracetam IV  500 mg Intravenous Q12H     PRN Meds:sodium chloride, dextrose 50%, glucagon (human recombinant), insulin aspart U-100    Review of patient's allergies indicates:   Allergen Reactions    Pcn [penicillins] Hives     Objective:     Vital Signs (Most Recent):  Temp: 98.5 °F (36.9 °C) (01/28/22 0715)  Pulse: 82 (01/28/22 0919)  Resp: (!) 21 (01/28/22 0919)  BP: (!) 90/0 (01/28/22 0900)  SpO2: 100 % (01/28/22 0919) Vital Signs (24h Range):  Temp:  [97 °F (36.1 °C)-98.5 °F (36.9 °C)] 98.5 °F (36.9 °C)  Pulse:  [] 82  Resp:  [17-24] 21  SpO2:  [85 %-100 %] 100 %  BP: ()/(0-89) 90/0  Arterial Line BP: ()/(50-89) 89/67     Patient Vitals for the past 72 hrs (Last 3 readings):   Weight   01/27/22 2000 82.4 kg (181 lb 10.5 oz)     Body mass index is 23.32 kg/m².      Intake/Output Summary (Last 24 hours) at 1/28/2022 0946  Last data filed at 1/28/2022 0900  Gross per 24 hour   Intake 304.69 ml   Output 840 ml   Net -535.31 ml       Hemodynamic Parameters:       Telemetry: SR    Physical Exam  Constitutional:       Appearance: Normal appearance.      Comments: Intubated   HENT:      Head: Normocephalic and atraumatic.    Eyes:      Conjunctiva/sclera: Conjunctivae normal.      Pupils: Pupils are equal, round, and reactive to light.   Neck:      Comments: Neck veins are not elevated  Cardiovascular:      Rate and Rhythm: Normal rate and regular rhythm.      Comments: Smooth VAD hum  Pulmonary:      Effort: Pulmonary effort is normal.      Breath sounds: Normal breath sounds.      Comments: Intubated, currently not sedated  Abdominal:      General: Bowel sounds are normal.      Palpations: Abdomen is soft.   Musculoskeletal:         General: No swelling. Normal range of motion.      Cervical back: Normal range of motion and neck supple.   Skin:     General: Skin is warm and dry.      Capillary Refill: Capillary refill takes 2 to 3 seconds.   Neurological:      Mental Status: He is alert.      Comments: Intubated, not currently sedated. Alert, nodes head appropriately to questions and follows commands. MAEE         Significant Labs:  CBC:  Recent Labs   Lab 01/27/22 1914 01/28/22 0816   WBC 10.41 8.14   RBC 3.62* 3.44*   HGB 10.4* 9.8*   HCT 34.2* 31.4*    181   MCV 95 91   MCH 28.7 28.5   MCHC 30.4* 31.2*     BNP:  Recent Labs   Lab 01/27/22 2055   *     CMP:  Recent Labs   Lab 01/27/22 2055 01/28/22 0816   * 101   CALCIUM 9.0 9.0   ALBUMIN 2.6*  --    PROT 8.7*  --    * 133*   K 4.6 4.0   CO2 22* 24    101   BUN 33* 33*   CREATININE 1.3 1.2   ALKPHOS 120  --    ALT 16  --    AST 38  --    BILITOT 1.6*  --       Coagulation:   Recent Labs   Lab 01/25/22  0000 01/27/22 1914 01/27/22 2055   INR 2.0 1.4* 1.3*   APTT  --  25.7 31.8     LDH:  Recent Labs   Lab 01/27/22 2055        Microbiology:  Microbiology Results (last 7 days)     ** No results found for the last 168 hours. **          I have reviewed all pertinent labs within the past 24 hours.    Estimated Creatinine Clearance: 70.4 mL/min (based on SCr of 1.2 mg/dL).    Diagnostic Results:  I have reviewed all pertinent imaging  results/findings within the past 24 hours.

## 2022-01-28 NOTE — PROGRESS NOTES
Admit Note     Met with spouse by phone to assess patients needs due to Pt being intubated.  Patient is a 66 y.o.  male, admitted for Bilateral subdural hematomas & Subarachnoid bleed. Pt received LVAD implant in January 2021.     Patient admitted from ED on 1/27/2022 .  At this time, patient presents as intubated.  At this time, patients caregiver presents by phone as alert and oriented x 4, calm, communicative, cooperative and asking and answering questions appropriately.      Household/Family Systems (as reported by patients caregiver)     Patient resides with patient's spouse, at    [address] [place] [address].      Support system includes spouse, siblings, adult children & adult stepchildren.  Patient does not have dependents that are need of being cared for.     Pt's cell: [phone]    Emergency Contacts  Meme France (spouse): [phone]  Desi Blanco (Pt's sister): [phone] - lives near Pt    During admission, patient's caregiver plans to stay at home.  Confirmed patient and patients caregivers do have access to reliable transportation.    Cognitive Status/Learning     Patients caregiver reports patients reading ability as 12th grade and states patient does have difficulty with seeing. Pt has an eye appt scheduled for March Patients caregiver reports patient learns best by reading and watching.   Needed: No.   Highest education level: High School (9-12) or GED    Vocation/Disability (as reported by patients caregiver)    Working for Income: No  If no, reason not working: Disability  Patient is disabled due to heart issues since 2007.  Prior to disability, patient  was employed as a .   Pt's spouse is retired.    Adherence     Patients caregiver reports patient has a high level of adherence to patients health care regimen.  Adherence counseling and education provided.  Patient's caregiver verbalizes understanding.    Substance Use    Patients caregiver  reports patients substance usage as the following:    Tobacco: none, patient denies any use.  Alcohol: Pt has the occasional beer.  Illicit Drugs/Non-prescribed Medications: none, patient denies any use.  Patients caregiver states clear understanding of the potential impact of substance use.  Substance abstinence/cessation counseling, education and resources provided and reviewed.     Services Utilizing/ADLS (as reported by patients caregiver)    Infusion Service: Prior to admission, patient utilizing? no  Home Health: Prior to admission, patient utilizing? yes - Pt is current with [facility] ([phone] F: [phone])  DME: Prior to admission, yes - Pt occasionally used a cane for balance  Pulmonary/Cardiac Rehab: Prior to admission, no  Dialysis:  Prior to admission, no  Transplant Specialty Pharmacy:  Prior to admission, no.    Prior to admission, patients caregiver reports patient was independent with ADLS and was driving.  Patients caregiver reports patient is not able to care for self at this time due to compromised medical condition (as documented in medical record) and physical weakness..  Patients caregiver reports patient indicates a willingness to care for self once medically cleared to do so.    Insurance/Medications    Insured by   Payer/Plan Subscr  Sex Relation Sub. Ins. ID Effective Group Num   1. PEOPLES GAYATHRI* ELANA ACEVEDO 1955 Male [id number] [address]      Primary Insurance (for UNOS reporting): Public Insurance - Medicare FFS (Fee For Service)  Secondary Insurance (for UNOS reporting): None    Patients caregiver reports patient is able to obtain and afford medications at this time and at time of discharge.    Advance Care Planning     Living Will/Healthcare Power of     Patients caregiver reports patient has a LW and/or HCPA. Documents are on file.         Coping/Mental Health (as reported by  patients caregiver)    Patient is coping adequately with the aid of  family members. Pt's caregiver reports that Pt has been grieiving recently as pt's cousin-in-law, with whom he was very close, recently . Patients caregiver is coping adequately with the aid of  family members. Caregiver voices feeling well-supported.    Discharge Planning (as reported by patients caregiver)    At time of discharge, patient plans to return to patient's home under the care of Pt's spouse.  Patients spouse will transport patient.  Per rounds today, expected discharge date has not been medically determined at this time. Patients caretaker verbalizes understanding and is involved in treatment planning and discharge process.    Additional Concerns    Patient's caretaker denies additional needs and/or concerns at this time. Patient is being followed for needs, education, resources, information, emotional support, supportive counseling, and for supportive and skilled discharge plan of care.  providing ongoing psychosocial support, education, resources and d/c planning as needed.  SW remains available. Patient's caregiver verbalizes understanding and agreement with information reviewed,  availability and how to access available resources as needed.

## 2022-01-28 NOTE — SUBJECTIVE & OBJECTIVE
Interval History: Exam improved yesterday from 3T to awake, alert and oriented and able to answer question by nodding or shaking head. No hydrocephalus or aneurysm noted on rpt CTH/CTA    Medications:  Continuous Infusions:   sodium chloride 0.9% 5 mL/hr at 01/28/22 1000    NORepinephrine bitartrate-D5W 0.008 mcg/kg/min (01/28/22 1000)    propofoL      sodium chloride 3%       Scheduled Meds:   atorvastatin  80 mg Oral Daily    chlorhexidine  15 mL Mouth/Throat BID    famotidine (PF)  20 mg Intravenous BID    levetiracetam IV  500 mg Intravenous Q12H    remdesivir loading dose infusion  200 mg Intravenous Q24H    Followed by    [START ON 1/29/2022] remdesivir infusion  100 mg Intravenous Daily     PRN Meds:sodium chloride, dextrose 50%, glucagon (human recombinant), insulin aspart U-100     Review of Systems  Objective:     Weight: 82.4 kg (181 lb 10.5 oz)  Body mass index is 23.32 kg/m².  Vital Signs (Most Recent):  Temp: 98.5 °F (36.9 °C) (01/28/22 0715)  Pulse: 80 (01/28/22 1000)  Resp: 18 (01/28/22 1000)  BP: (!) 78/0 (01/28/22 1000)  SpO2: 100 % (01/28/22 1000) Vital Signs (24h Range):  Temp:  [97 °F (36.1 °C)-98.5 °F (36.9 °C)] 98.5 °F (36.9 °C)  Pulse:  [] 80  Resp:  [17-24] 18  SpO2:  [85 %-100 %] 100 %  BP: ()/(0-89) 78/0  Arterial Line BP: ()/(50-89) 77/61     Date 01/28/22 0700 - 01/29/22 0659   Shift 6162-2277 8799-3788 5238-0804 24 Hour Total   INTAKE   I.V.(mL/kg) 65.5(0.8)   65.5(0.8)   IV Piggyback 246.7   246.7   Shift Total(mL/kg) 312.2(3.8)   312.2(3.8)   OUTPUT   Urine(mL/kg/hr) 185   185   Shift Total(mL/kg) 185(2.2)   185(2.2)   Weight (kg) 82.4 82.4 82.4 82.4              Vent Mode: Spont  Oxygen Concentration (%):  [35-50] 35  Resp Rate Total:  [9.8 br/min-22 br/min] 18 br/min  Vt Set:  [450 mL] 450 mL  PEEP/CPAP:  [5 cmH20-8 cmH20] 5 cmH20  Pressure Support:  [10 cmH20] 10 cmH20  Mean Airway Pressure:  [8.1 ahM30-13 cmH20] 8.1 cmH20         NG/OG Tube 01/27/22  "2140 18 Fr. Center mouth (Active)   $ NG/OG Tube Placement Complete 01/27/22 2130   Placement Check placement verified by x-ray;placement verified by aspirate characteristics 01/27/22 2309   Tolerance no signs/symptoms of discomfort 01/27/22 2309   Securement secured to commercial device 01/27/22 2309   Clamp Status/Tolerance clamped 01/27/22 2309   Suction Setting/Drainage Method suction at the bedside 01/27/22 2309   Insertion Site Appearance no redness, warmth, tenderness, skin breakdown, drainage 01/27/22 2309   Flush/Irrigation flushed w/;water;no resistance met 01/27/22 2309            Urethral Catheter 01/27/22 1858 (Active)   $ Duarte Insertion Complete 01/27/22 1947   Site Assessment Clean;Intact 01/28/22 0300   Collection Container Urimeter 01/28/22 0300   Securement Method secured to top of thigh w/ adhesive device 01/28/22 0300   Catheter Care Performed no 01/28/22 0300   Reason for Continuing Urinary Catheterization Critically ill in ICU and requiring hourly monitoring of intake/output 01/28/22 0300   CAUTI Prevention Bundle StatLock in place w 1" slack;Intact seal between catheter & drainage tubing;Drainage bag/urimeter off the floor;Sheeting clip in use;No dependent loops or kinks;Drainage bag/urimeter not overfilled (<2/3 full);Drainage bag/urimeter below bladder 01/28/22 0300   Output (mL) 55 mL 01/28/22 1000       Physical Exam:    Constitutional: He appears well-nourished. No distress.     Eyes: Pupils are equal, round, and reactive to light. EOM are normal.     Cardiovascular: Normal rate and regular rhythm.     Abdominal: Soft.     Psych/Behavior: He is alert.     E4VtM6  Alert  Answers questions appropriately by nodding/shaking head  PERRL  EOMI  Face Symmetric  BUE 5/5  BLE 5/5  No drift      Significant Labs:  Recent Labs   Lab 01/27/22 2055 01/28/22  0816   * 101   * 133*   K 4.6 4.0    101   CO2 22* 24   BUN 33* 33*   CREATININE 1.3 1.2   CALCIUM 9.0 9.0   MG 2.0 1.9 "     Recent Labs   Lab 01/27/22 1914 01/28/22  0816   WBC 10.41 8.14   HGB 10.4* 9.8*   HCT 34.2* 31.4*    181     Recent Labs   Lab 01/27/22 1914 01/27/22  2055   INR 1.4* 1.3*   APTT 25.7 31.8     Microbiology Results (last 7 days)     ** No results found for the last 168 hours. **        All pertinent labs from the last 24 hours have been reviewed.    Significant Diagnostics:  I have reviewed and interpreted all pertinent imaging results/findings within the past 24 hours.

## 2022-01-28 NOTE — SUBJECTIVE & OBJECTIVE
Past Medical History:   Diagnosis Date    CLARISSE (acute kidney injury) 1/11/2021    CHF (congestive heart failure)     Chronic combined systolic and diastolic congestive heart failure 1/9/2021    Coronary artery disease     Diabetes mellitus     Hypertension     Kidney stones        Past Surgical History:   Procedure Laterality Date    CARDIAC CATHETERIZATION  2010    no stents    CARDIAC DEFIBRILLATOR PLACEMENT  2010    CARDIAC DEFIBRILLATOR PLACEMENT      HERNIA REPAIR      IRRIGATION OF MEDIASTINUM N/A 1/14/2021    Procedure: IRRIGATION, MEDIASTINUM;  Surgeon: Zenon Corona MD;  [facility];  Service: Cardiovascular;  Laterality: N/A;    KNEE ARTHROSCOPY Right     LEFT VENTRICULAR ASSIST DEVICE Left 1/13/2021    Procedure: INSERTION- HeartMate 3 LEFT VENTRICULAR ASSIST DEVICE ;  Surgeon: Zenon Corona MD;  [facility];  Service: Cardiovascular;  Laterality: Left;    RECONSTRUCTION OF PERICARDIUM N/A 1/14/2021    Procedure: RECONSTRUCTION, PERICARDIUM;  Surgeon: Zenon Corona MD;  [facility];  Service: Cardiovascular;  Laterality: N/A;    RIGHT HEART CATHETERIZATION Right 11/2/2020    Procedure: INSERTION, CATHETER, RIGHT HEART;  Surgeon: Deana Lake MD;  Location: [facility] CATH LAB;  Service: Cardiology;  Laterality: Right;    STERNAL WOUND CLOSURE  1/13/2021    Procedure: TEMPORARY CLOSURE OF CHEST;  Surgeon: Zenon Corona MD;  [facility];  Service: Cardiovascular;;    STERNAL WOUND CLOSURE N/A 1/14/2021    Procedure: CLOSURE, WOUND, STERNUM;  Surgeon: Zenon Corona MD;  [facility];  Service: Cardiovascular;  Laterality: N/A;       Social History     Socioeconomic History    Marital status:    Tobacco Use    Smoking status: Current Some Day Smoker     Types: Cigarettes    Smokeless tobacco: Never Used   Substance and Sexual Activity    Alcohol use: No       Family History   Problem Relation Age of Onset     Heart attack Mother     Heart failure Brother     Heart attack Brother     Hypertension Brother        Review of patient's allergies indicates:   Allergen Reactions    Pcn [penicillins] Hives         Medications:  Continuous Infusions:   propofoL       Scheduled Meds:   [START ON 1/28/2022] atorvastatin  80 mg Oral Daily    chlorhexidine  15 mL Mouth/Throat BID    famotidine (PF)  20 mg Intravenous BID    lisinopriL  20 mg Oral BID     PRN Meds:sodium chloride, dextrose 50%, glucagon (human recombinant), insulin aspart U-100     Review of Systems   Reason unable to perform ROS: AMS.     Objective:     Weight: 82.4 kg (181 lb 10.5 oz)  Body mass index is 23.32 kg/m².  Vital Signs (Most Recent):  Temp: 97 °F (36.1 °C) (01/27/22 2000)  Pulse: 91 (01/27/22 2100)  Resp: 18 (01/27/22 1822)  BP: (!) 110/0 (01/27/22 1915)  SpO2: 100 % (01/27/22 2100) Vital Signs (24h Range):  Temp:  [97 °F (36.1 °C)-98.5 °F (36.9 °C)] 97 °F (36.1 °C)  Pulse:  [] 91  Resp:  [17-18] 18  SpO2:  [99 %-100 %] 100 %  BP: (105-111)/(0-89) 110/0     Date 01/27/22 0700 - 01/28/22 0659   Shift 5861-8746 7635-6965 6535-6470 24 Hour Total   INTAKE   Shift Total(mL/kg)       OUTPUT   Urine  200  200   Shift Total(mL/kg)  200(2.4)  200(2.4)   Weight (kg)  82.4 82.4 82.4              Vent Mode: A/C  Oxygen Concentration (%):  [50] 50  Resp Rate Total:  [18 br/min-22 br/min] 22 br/min  Vt Set:  [450 mL] 450 mL  PEEP/CPAP:  [8 cmH20] 8 cmH20  Mean Airway Pressure:  [11 [id number] cmH20] 12 cmH20         Urethral Catheter 01/27/22 1858 (Active)   Output (mL) 200 mL 01/27/22 1918       Physical Exam:    Constitutional:   Intubated, sedated     Eyes: Pupils are equal, round, and reactive to light.     Cardiovascular:   Tachy       Abdominal: Soft.     Psych/Behavior:   comatose      3T  PERRL  OC/cough/gag intact  No corneals  No motor response to stimulation  Myoclonic jerking          Significant Labs:  Recent Labs   Lab 01/27/22 205   GLU  154*   *   K 4.6      CO2 22*   BUN 33*   CREATININE 1.3   CALCIUM 9.0   MG 2.0     Recent Labs   Lab 01/27/22 1914   WBC 10.41   HGB 10.4*   HCT 34.2*        Recent Labs   Lab 01/27/22 1914 01/27/22 2055   INR 1.4* 1.3*   APTT 25.7 31.8     Microbiology Results (last 7 days)     ** No results found for the last 168 hours. **        All pertinent labs from the last 24 hours have been reviewed.    Significant Diagnostics:  I have reviewed and interpreted all pertinent imaging results/findings within the past 24 hours.

## 2022-01-28 NOTE — PROGRESS NOTES
01/28/2022  Micah WALTER Velez Jr    Current provider:  Deana Lake MD    Device interrogation:  TXP LVAD INTERROGATIONS 1/28/2022 1/28/2022 1/28/2022 1/28/2022 1/28/2022 1/28/2022 1/28/2022   Type HeartMate3 HeartMate3 HeartMate3 HeartMate3 HeartMate3 HeartMate3 HeartMate3   Flow 3.9 3.8 3.8 3.8 3.7 3.6 3.7   Speed 5000 5000 5000 5000 5000 5000 5000   PI 5.8 5.8 6.4 6 6.2 1.9 1.9   Power (Edwards) 3.4 3.4 3.4 3.4 3.3 3.2 3.3   LSL 4600 4600 4600 4600 4600 4600 4600   Pulsatility Intermittent pulse Intermittent pulse Intermittent pulse Intermittent pulse Intermittent pulse Intermittent pulse Intermittent pulse          Rounded on Rocco France to ensure all mechanical assist device settings (IABP or VAD) were appropriate and all parameters were within limits.  I was able to ensure all back up equipment was present, the staff had no issues, and the Perfusion Department daily rounding was complete. Pt. admitted with head bleed per CT at OSH.    For implantable VADs: Interrogation of Ventricular assist device was performed with analysis of device parameters and review of device function. I have personally reviewed the interrogation findings and agree with findings as stated.     In emergency, the nursing units have been notified to contact the perfusion department either by:  Calling [phone] from 630am to 4pm Mon thru Fri, utilizing the On-Call Finder functionality of Epic and searching for Perfusion, or by contacting the hospital  from 4pm to 630am and on weekends and asking to speak with the perfusionist on call.    9:23 AM

## 2022-01-29 LAB
ALLENS TEST: ABNORMAL
ANION GAP SERPL CALC-SCNC: 7 MMOL/L (ref 8–16)
APTT BLDCRRT: 32.4 SEC (ref 21–32)
BASOPHILS # BLD AUTO: 0.03 K/UL (ref 0–0.2)
BASOPHILS NFR BLD: 0.4 % (ref 0–1.9)
BUN SERPL-MCNC: 24 MG/DL (ref 8–23)
CALCIUM SERPL-MCNC: 8.7 MG/DL (ref 8.7–10.5)
CHLORIDE SERPL-SCNC: 106 MMOL/L (ref 95–110)
CO2 SERPL-SCNC: 26 MMOL/L (ref 23–29)
CREAT SERPL-MCNC: 1 MG/DL (ref 0.5–1.4)
DELSYS: ABNORMAL
DIFFERENTIAL METHOD: ABNORMAL
EOSINOPHIL # BLD AUTO: 0 K/UL (ref 0–0.5)
EOSINOPHIL NFR BLD: 0.3 % (ref 0–8)
ERYTHROCYTE [DISTWIDTH] IN BLOOD BY AUTOMATED COUNT: 14.7 % (ref 11.5–14.5)
ERYTHROCYTE [SEDIMENTATION RATE] IN BLOOD BY WESTERGREN METHOD: 10 MM/H
EST. GFR  (AFRICAN AMERICAN): >60 ML/MIN/1.73 M^2
EST. GFR  (NON AFRICAN AMERICAN): >60 ML/MIN/1.73 M^2
GLUCOSE SERPL-MCNC: 86 MG/DL (ref 70–110)
HCO3 UR-SCNC: 26 MMOL/L (ref 24–28)
HCT VFR BLD AUTO: 30.7 % (ref 40–54)
HGB BLD-MCNC: 9.5 G/DL (ref 14–18)
IMM GRANULOCYTES # BLD AUTO: 0.02 K/UL (ref 0–0.04)
IMM GRANULOCYTES NFR BLD AUTO: 0.3 % (ref 0–0.5)
INR PPP: 1.4 (ref 0.8–1.2)
LDH SERPL L TO P-CCNC: 207 U/L (ref 110–260)
LYMPHOCYTES # BLD AUTO: 1.6 K/UL (ref 1–4.8)
LYMPHOCYTES NFR BLD: 22.7 % (ref 18–48)
MAGNESIUM SERPL-MCNC: 1.9 MG/DL (ref 1.6–2.6)
MAGNESIUM SERPL-MCNC: 1.9 MG/DL (ref 1.6–2.6)
MCH RBC QN AUTO: 28.7 PG (ref 27–31)
MCHC RBC AUTO-ENTMCNC: 30.9 G/DL (ref 32–36)
MCV RBC AUTO: 93 FL (ref 82–98)
MODE: ABNORMAL
MONOCYTES # BLD AUTO: 1 K/UL (ref 0.3–1)
MONOCYTES NFR BLD: 13.5 % (ref 4–15)
NEUTROPHILS # BLD AUTO: 4.5 K/UL (ref 1.8–7.7)
NEUTROPHILS NFR BLD: 62.8 % (ref 38–73)
NRBC BLD-RTO: 0 /100 WBC
PCO2 BLDA: 36.9 MMHG (ref 35–45)
PH SMN: 7.46 [PH] (ref 7.35–7.45)
PHOSPHATE SERPL-MCNC: 2.2 MG/DL (ref 2.7–4.5)
PLATELET # BLD AUTO: 181 K/UL (ref 150–450)
PMV BLD AUTO: 10.5 FL (ref 9.2–12.9)
PO2 BLDA: 100 MMHG (ref 80–100)
POC BE: 2 MMOL/L
POC SATURATED O2: 98 % (ref 95–100)
POC TCO2: 27 MMOL/L (ref 23–27)
POCT GLUCOSE: 109 MG/DL (ref 70–110)
POCT GLUCOSE: 85 MG/DL (ref 70–110)
POCT GLUCOSE: 93 MG/DL (ref 70–110)
POTASSIUM SERPL-SCNC: 3.8 MMOL/L (ref 3.5–5.1)
POTASSIUM SERPL-SCNC: 4.1 MMOL/L (ref 3.5–5.1)
PROTHROMBIN TIME: 15.1 SEC (ref 9–12.5)
RBC # BLD AUTO: 3.31 M/UL (ref 4.6–6.2)
SAMPLE: ABNORMAL
SITE: ABNORMAL
SODIUM SERPL-SCNC: 135 MMOL/L (ref 136–145)
SODIUM SERPL-SCNC: 136 MMOL/L (ref 136–145)
SODIUM SERPL-SCNC: 139 MMOL/L (ref 136–145)
SP02: 100
WBC # BLD AUTO: 7.13 K/UL (ref 3.9–12.7)

## 2022-01-29 PROCEDURE — 99900035 HC TECH TIME PER 15 MIN (STAT)

## 2022-01-29 PROCEDURE — 95718 EEG PHYS/QHP 2-12 HR W/VEEG: CPT | Mod: ,,, | Performed by: PSYCHIATRY & NEUROLOGY

## 2022-01-29 PROCEDURE — 97535 SELF CARE MNGMENT TRAINING: CPT

## 2022-01-29 PROCEDURE — 63600175 PHARM REV CODE 636 W HCPCS: Performed by: NURSE PRACTITIONER

## 2022-01-29 PROCEDURE — 99291 CRITICAL CARE FIRST HOUR: CPT | Mod: ,,, | Performed by: INTERNAL MEDICINE

## 2022-01-29 PROCEDURE — A4217 STERILE WATER/SALINE, 500 ML: HCPCS | Performed by: PSYCHIATRY & NEUROLOGY

## 2022-01-29 PROCEDURE — 99292 CRITICAL CARE ADDL 30 MIN: CPT | Mod: ,,, | Performed by: INTERNAL MEDICINE

## 2022-01-29 PROCEDURE — 99233 PR SUBSEQUENT HOSPITAL CARE,LEVL III: ICD-10-PCS | Mod: GC,,, | Performed by: NEUROLOGICAL SURGERY

## 2022-01-29 PROCEDURE — 85730 THROMBOPLASTIN TIME PARTIAL: CPT | Performed by: HOSPITALIST

## 2022-01-29 PROCEDURE — 83735 ASSAY OF MAGNESIUM: CPT | Mod: 91 | Performed by: INTERNAL MEDICINE

## 2022-01-29 PROCEDURE — 92610 EVALUATE SWALLOWING FUNCTION: CPT

## 2022-01-29 PROCEDURE — 84132 ASSAY OF SERUM POTASSIUM: CPT | Performed by: INTERNAL MEDICINE

## 2022-01-29 PROCEDURE — 99233 SBSQ HOSP IP/OBS HIGH 50: CPT | Mod: GC,,, | Performed by: NEUROLOGICAL SURGERY

## 2022-01-29 PROCEDURE — 97530 THERAPEUTIC ACTIVITIES: CPT

## 2022-01-29 PROCEDURE — 27000207 HC ISOLATION

## 2022-01-29 PROCEDURE — 99291 PR CRITICAL CARE, E/M 30-74 MINUTES: ICD-10-PCS | Mod: ,,, | Performed by: INTERNAL MEDICINE

## 2022-01-29 PROCEDURE — 84100 ASSAY OF PHOSPHORUS: CPT | Performed by: HOSPITALIST

## 2022-01-29 PROCEDURE — C1751 CATH, INF, PER/CENT/MIDLINE: HCPCS

## 2022-01-29 PROCEDURE — 25000003 PHARM REV CODE 250: Performed by: PSYCHIATRY & NEUROLOGY

## 2022-01-29 PROCEDURE — 37799 UNLISTED PX VASCULAR SURGERY: CPT

## 2022-01-29 PROCEDURE — 20000000 HC ICU ROOM

## 2022-01-29 PROCEDURE — 36620 INSERTION CATHETER ARTERY: CPT

## 2022-01-29 PROCEDURE — 27200188 HC TRANSDUCER, ART ADULT/PEDS

## 2022-01-29 PROCEDURE — 93750 INTERROGATION VAD IN PERSON: CPT | Mod: ,,, | Performed by: INTERNAL MEDICINE

## 2022-01-29 PROCEDURE — 99292 PR CRITICAL CARE, ADDL 30 MIN: ICD-10-PCS | Mod: ,,, | Performed by: INTERNAL MEDICINE

## 2022-01-29 PROCEDURE — 85025 COMPLETE CBC W/AUTO DIFF WBC: CPT | Performed by: HOSPITALIST

## 2022-01-29 PROCEDURE — 27000248 HC VAD-ADDITIONAL DAY

## 2022-01-29 PROCEDURE — 25000003 PHARM REV CODE 250: Performed by: INTERNAL MEDICINE

## 2022-01-29 PROCEDURE — 93750 PR INTERROGATE VENT ASSIST DEV, IN PERSON, W PHYSICIAN ANALYSIS: ICD-10-PCS | Mod: ,,, | Performed by: INTERNAL MEDICINE

## 2022-01-29 PROCEDURE — 25000003 PHARM REV CODE 250: Performed by: NURSE PRACTITIONER

## 2022-01-29 PROCEDURE — 80048 BASIC METABOLIC PNL TOTAL CA: CPT | Performed by: HOSPITALIST

## 2022-01-29 PROCEDURE — 97165 OT EVAL LOW COMPLEX 30 MIN: CPT

## 2022-01-29 PROCEDURE — 84295 ASSAY OF SERUM SODIUM: CPT | Mod: 91 | Performed by: PSYCHIATRY & NEUROLOGY

## 2022-01-29 PROCEDURE — 63600175 PHARM REV CODE 636 W HCPCS: Performed by: PSYCHIATRY & NEUROLOGY

## 2022-01-29 PROCEDURE — 84295 ASSAY OF SERUM SODIUM: CPT | Performed by: PSYCHIATRY & NEUROLOGY

## 2022-01-29 PROCEDURE — 94761 N-INVAS EAR/PLS OXIMETRY MLT: CPT

## 2022-01-29 PROCEDURE — 83615 LACTATE (LD) (LDH) ENZYME: CPT | Performed by: HOSPITALIST

## 2022-01-29 PROCEDURE — 85610 PROTHROMBIN TIME: CPT | Performed by: HOSPITALIST

## 2022-01-29 PROCEDURE — 83735 ASSAY OF MAGNESIUM: CPT | Performed by: HOSPITALIST

## 2022-01-29 PROCEDURE — 95718 PR EEG, W/VIDEO, CONT RECORD, I&R, 2-12 HRS: ICD-10-PCS | Mod: ,,, | Performed by: PSYCHIATRY & NEUROLOGY

## 2022-01-29 PROCEDURE — 27000221 HC OXYGEN, UP TO 24 HOURS

## 2022-01-29 PROCEDURE — 82803 BLOOD GASES ANY COMBINATION: CPT

## 2022-01-29 PROCEDURE — 25000003 PHARM REV CODE 250: Performed by: HOSPITALIST

## 2022-01-29 PROCEDURE — 63600175 PHARM REV CODE 636 W HCPCS: Performed by: STUDENT IN AN ORGANIZED HEALTH CARE EDUCATION/TRAINING PROGRAM

## 2022-01-29 PROCEDURE — 97161 PT EVAL LOW COMPLEX 20 MIN: CPT

## 2022-01-29 RX ORDER — MAGNESIUM SULFATE 1 G/100ML
1 INJECTION INTRAVENOUS ONCE
Status: COMPLETED | OUTPATIENT
Start: 2022-01-29 | End: 2022-01-29

## 2022-01-29 RX ORDER — ACETAMINOPHEN 325 MG/1
650 TABLET ORAL EVERY 6 HOURS PRN
Status: DISCONTINUED | OUTPATIENT
Start: 2022-01-29 | End: 2022-02-01

## 2022-01-29 RX ORDER — LANOLIN ALCOHOL/MO/W.PET/CERES
400 CREAM (GRAM) TOPICAL ONCE
Status: COMPLETED | OUTPATIENT
Start: 2022-01-29 | End: 2022-01-29

## 2022-01-29 RX ORDER — POTASSIUM CHLORIDE 20 MEQ/1
40 TABLET, EXTENDED RELEASE ORAL ONCE
Status: COMPLETED | OUTPATIENT
Start: 2022-01-29 | End: 2022-01-29

## 2022-01-29 RX ADMIN — FAMOTIDINE 20 MG: 10 INJECTION, SOLUTION INTRAVENOUS at 08:01

## 2022-01-29 RX ADMIN — LEVETIRACETAM 500 MG: 500 INJECTION, SOLUTION INTRAVENOUS at 08:01

## 2022-01-29 RX ADMIN — REMDESIVIR 100 MG: 100 INJECTION, POWDER, LYOPHILIZED, FOR SOLUTION INTRAVENOUS at 09:01

## 2022-01-29 RX ADMIN — SODIUM ACETATE: 164 INJECTION, SOLUTION, CONCENTRATE INTRAVENOUS at 07:01

## 2022-01-29 RX ADMIN — CHLORHEXIDINE GLUCONATE 0.12% ORAL RINSE 15 ML: 1.2 LIQUID ORAL at 09:01

## 2022-01-29 RX ADMIN — CHLORHEXIDINE GLUCONATE 0.12% ORAL RINSE 15 ML: 1.2 LIQUID ORAL at 08:01

## 2022-01-29 RX ADMIN — LEVETIRACETAM 500 MG: 500 INJECTION, SOLUTION INTRAVENOUS at 09:01

## 2022-01-29 RX ADMIN — SODIUM ACETATE: 164 INJECTION, SOLUTION, CONCENTRATE INTRAVENOUS at 11:01

## 2022-01-29 RX ADMIN — SODIUM CHLORIDE: 0.9 INJECTION, SOLUTION INTRAVENOUS at 07:01

## 2022-01-29 RX ADMIN — NOREPINEPHRINE BITARTRATE 0.01 MCG/KG/MIN: 4 INJECTION, SOLUTION INTRAVENOUS at 04:01

## 2022-01-29 RX ADMIN — Medication 400 MG: at 08:01

## 2022-01-29 RX ADMIN — ATORVASTATIN CALCIUM 80 MG: 20 TABLET, FILM COATED ORAL at 08:01

## 2022-01-29 RX ADMIN — MAGNESIUM SULFATE HEPTAHYDRATE 1 G: 500 INJECTION, SOLUTION INTRAMUSCULAR; INTRAVENOUS at 07:01

## 2022-01-29 RX ADMIN — FAMOTIDINE 20 MG: 10 INJECTION, SOLUTION INTRAVENOUS at 09:01

## 2022-01-29 RX ADMIN — POTASSIUM CHLORIDE 40 MEQ: 1500 TABLET, EXTENDED RELEASE ORAL at 08:01

## 2022-01-29 NOTE — SUBJECTIVE & OBJECTIVE
Interval History:  No events overnight.  EEG was negative for seizures.     Continuous Infusions:   sodium chloride 0.9% Stopped (01/28/22 1529)    NORepinephrine bitartrate-D5W Stopped (01/29/22 1048)    buffered 3% sodium acetate 130meq, sodium chloride 130meq, sterile water for inj IV soln 30 mL/hr at 01/29/22 1100     Scheduled Meds:   atorvastatin  80 mg Oral Daily    chlorhexidine  15 mL Mouth/Throat BID    famotidine (PF)  20 mg Intravenous BID    levetiracetam IV  500 mg Intravenous Q12H    remdesivir infusion  100 mg Intravenous Daily     PRN Meds:sodium chloride, dextrose 50%, glucagon (human recombinant), insulin aspart U-100    Review of patient's allergies indicates:   Allergen Reactions    Pcn [penicillins] Hives     Objective:     Vital Signs (Most Recent):  Temp: 97.8 °F (36.6 °C) (01/29/22 1100)  Pulse: 66 (01/29/22 1100)  Resp: 11 (01/29/22 1100)  BP: (!) 116/58 (01/29/22 1113)  SpO2: 100 % (01/29/22 1100) Vital Signs (24h Range):  Temp:  [97.8 °F (36.6 °C)-98.8 °F (37.1 °C)] 97.8 °F (36.6 °C)  Pulse:  [64-92] 66  Resp:  [10-23] 11  SpO2:  [96 %-100 %] 100 %  BP: ()/(0-82) 116/58  Arterial Line BP: ()/(60-82) 103/75     Patient Vitals for the past 72 hrs (Last 3 readings):   Weight   01/27/22 2000 82.4 kg (181 lb 10.5 oz)     Body mass index is 23.32 kg/m².      Intake/Output Summary (Last 24 hours) at 1/29/2022 1159  Last data filed at 1/29/2022 1100  Gross per 24 hour   Intake 1439.48 ml   Output 1253 ml   Net 186.48 ml       Hemodynamic Parameters:       Telemetry: NSR     Physical Exam  Constitutional:       Appearance: Normal appearance.   HENT:      Head: Normocephalic.      Nose: Nose normal.   Cardiovascular:      Rate and Rhythm: Normal rate and regular rhythm.      Pulses: Normal pulses.      Heart sounds: Normal heart sounds.      Comments: VAD hum present  Pulmonary:      Effort: Pulmonary effort is normal.      Breath sounds: Normal breath sounds.   Abdominal:       General: Abdomen is flat. Bowel sounds are normal.      Palpations: Abdomen is soft.   Musculoskeletal:         General: Normal range of motion.      Cervical back: Normal range of motion.   Skin:     General: Skin is warm.      Capillary Refill: Capillary refill takes less than 2 seconds.   Neurological:      General: No focal deficit present.      Mental Status: He is alert and oriented to person, place, and time.         Significant Labs:  CBC:  Recent Labs   Lab 01/27/22 1914 01/28/22 0816 01/29/22 0442   WBC 10.41 8.14 7.13   RBC 3.62* 3.44* 3.31*   HGB 10.4* 9.8* 9.5*   HCT 34.2* 31.4* 30.7*    181 181   MCV 95 91 93   MCH 28.7 28.5 28.7   MCHC 30.4* 31.2* 30.9*     BNP:  Recent Labs   Lab 01/27/22 2055   *     CMP:  Recent Labs   Lab 01/27/22 2055 01/27/22 2055 01/28/22 0816 01/28/22  1257 01/29/22  0016 01/29/22 0442 01/29/22  0616   *  --  101  --   --  86  --    CALCIUM 9.0  --  9.0  --   --  8.7  --    ALBUMIN 2.6*  --   --   --   --   --   --    PROT 8.7*  --   --   --   --   --   --    *   < > 133*   < > 136 139 139   K 4.6  --  4.0  --   --  3.8  --    CO2 22*  --  24  --   --  26  --      --  101  --   --  106  --    BUN 33*  --  33*  --   --  24*  --    CREATININE 1.3  --  1.2  --   --  1.0  --    ALKPHOS 120  --   --   --   --   --   --    ALT 16  --   --   --   --   --   --    AST 38  --   --   --   --   --   --    BILITOT 1.6*  --   --   --   --   --   --     < > = values in this interval not displayed.      Coagulation:   Recent Labs   Lab 01/27/22 1914 01/27/22 2055 01/29/22  0442   INR 1.4* 1.3* 1.4*   APTT 25.7 31.8 32.4*     LDH:  Recent Labs   Lab 01/27/22 2055 01/29/22 0442    207     Microbiology:  Microbiology Results (last 7 days)     ** No results found for the last 168 hours. **          I have reviewed all pertinent labs within the past 24 hours.    Estimated Creatinine Clearance: 84.5 mL/min (based on SCr of 1 mg/dL).    Diagnostic  Results:  I have reviewed and interpreted all pertinent imaging results/findings within the past 24 hours.

## 2022-01-29 NOTE — PLAN OF CARE
CICU DAILY GOALS       A: Awake    RASS: Goal - RASS Goal: 0-->alert and calm  Actual - RASS (Jackson Agitation-Sedation Scale): 0-->alert and calm   Restraint necessity: Clinical Justification: Removing medical devices  B: Breath   SBT: Not intubated   C: Coordinate A & B, analgesics/sedatives   Pain: managed    SAT: Not intubated  D: Delirium   CAM-ICU: Overall CAM-ICU: Negative  E: Early(intubated/ Progressive (non-intubated) Mobility   MOVE Screen: Fail; c/o dizziness upon standing with PT/OT   Activity: Activity Management: Arm raise - L1,Leg kicks - L2,Rolling - L1  FAS: Feeding/Nutrition   Diet order: Diet/Nutrition Received: NPO,   Fluid restriction:    T: Thrombus   DVT prophylaxis: VTE Required Core Measure: Per order contraindicated for SCDs/Anticoagulants  H: HOB Elevation   Head of Bed (HOB) Positioning: HOB elevated  U: Ulcer Prophylaxis   GI: yes  G: Glucose control   managed Glycemic Management: blood glucose monitored  S: Skin   Bundle compliance: yes   Linen change, changed gown Date: 1/29/22  B: Bowel Function   no issues ; LBM 01/29/22  I: Indwelling Catheters   Duarte necessity:      Urethral Catheter 01/27/22 0469-Reason for Continuing Urinary Catheterization: Critically ill in ICU and requiring hourly monitoring of intake/output   CVC necessity: No   IPAD offered: No  D: De-escalation Antibx   No  Plan for the day  - Repeat head CT obtained  - Off levo gtt this AM  - Evaluated by SLP; followed up with primary team/neurosurgery re: patient's diet.  Pending approval to start diet.    Family/Goals of care/Code Status   Code Status: Full Code     No acute events throughout day, VS and assessment per flow sheet, patient progressing towards goals as tolerated, plan of care reviewed with Rocco France and family, all concerns addressed, will continue to monitor.

## 2022-01-29 NOTE — ASSESSMENT & PLAN NOTE
-Patient had a traumatic subdural, subarachnoid hemorrhage after fall on 1/27. Currently no focal deficits  -NCC and NSGY following. Plans for serial imagine pending  -INR 1.4 on admit, 1.3 this morning. Coumadin/ASA on hold  -Patient had elevated maps above 90 on arrival, but overnight required addition of low dose Levophed to keep MAP > 65  -Goal NA+ > 135, 133 this morning. Awaiting NCC input  -Keppra started as AED. EEG pending  -Likely will require angiogram per NSGY    1/29   Rpt CT as per NSy  EEG negative for Seizures   On hypertonic saline to keep Sodium  >135

## 2022-01-29 NOTE — PT/OT/SLP EVAL
Physical Therapy Evaluation    Patient Name:  Rocco France   MRN:  [id number]    Recommendations:     Discharge Recommendations:   (TBD)   Discharge Equipment Recommendations:  (TBD)   Barriers to discharge: None    Assessment:     Rocco France is a 66 y.o. male admitted with a medical diagnosis of Bilateral subdural hematomas.  He presents with the following impairments/functional limitations:  weakness,impaired endurance,impaired self care skills,impaired functional mobilty,gait instability,impaired balance,decreased safety awareness,impaired cardiopulmonary response to activity .  Patient mobility limited to EOB due to ongoing EEG study and recurring orthostatic hypotension.  Patient will benefit from continued skilled PT to address deficits and maximize function.      Rehab Prognosis: Fair;   Recent Surgery: * No surgery found *      Plan:     During this hospitalization, patient to be seen 4 x/week to address the identified rehab impairments via gait training,therapeutic activities,therapeutic exercises,neuromuscular re-education and progress toward the following goals:    · Plan of Care Expires:       Subjective     Chief Complaint: none stated   Patient/Family Comments/goals: none   Pain/Comfort:  · Pain Rating 1: 0/10    Patients cultural, spiritual, Tenriism conflicts given the current situation: no    Living Environment:  Patient lives with his spouse in a single story home with 3 -steps to enter.     Prior to admission, patients level of function was independent.  Equipment used at home: walker, rolling,cane, straight.  DME owned (not currently used): rolling walker and single point cane.  Upon discharge, patient will have assistance from family.    Objective:     Communicated with RN prior to session.  Patient found supine with arterial line,bed alarm,blood pressure cuff,EEG,oxygen,peripheral IV,pulse ox (continuous),LVAD  upon PT entry to room.    General Precautions: Standard, fall,LVAD    Orthopedic Precautions:N/A   Braces: N/A  Respiratory Status: Room air    Exams:  · Cognitive Exam:  Patient is oriented to Person, Place, Time and Situation  · RLE ROM: WFL  · RLE Strength: WFL  · LLE ROM: WFL  · LLE Strength: WFL    Functional Mobility:  · Bed Mobility:     · Scooting: minimum assistance  · Sit to Supine: minimum assistance  · Transfers:     · Sit to Stand:  minimum assistance with hand-held assist  · Gait: deferred -- limited by orthostatic hypotension   · Balance: Dynamic sitting at EOB -- CGA       AM-PAC 6 CLICK MOBILITY  Total Score:11     Patient left supine with all lines intact, call button in reach and RN notified.    GOALS:   Multidisciplinary Problems     Physical Therapy Goals        Problem: Physical Therapy Goal    Goal Priority Disciplines Outcome Goal Variances Interventions   Physical Therapy Goal     PT, PT/OT Ongoing, Progressing     Description: Goals to be met by: 2022      Patient will increase functional independence with mobility by performin. Supine to sit with Modified [place]  2. Sit to stand transfer with Modified [place]  3. Bed to chair transfer with Modified [place] using the least restrictive device.   4. Gait  x 150 feet with Modified [place] using the least restrictive device.                       History:     Past Medical History:   Diagnosis Date    CLARISSE (acute kidney injury) 2021    CHF (congestive heart failure)     Chronic combined systolic and diastolic congestive heart failure 2021    Coronary artery disease     Diabetes mellitus     Hypertension     Kidney stones        Past Surgical History:   Procedure Laterality Date    CARDIAC CATHETERIZATION      no stents    CARDIAC DEFIBRILLATOR PLACEMENT      CARDIAC DEFIBRILLATOR PLACEMENT      HERNIA REPAIR      IRRIGATION OF MEDIASTINUM N/A 2021    Procedure: IRRIGATION, MEDIASTINUM;  Surgeon: Zenon Corona MD;  Location: [facility] OR [address] [place] [address];   Service: Cardiovascular;  Laterality: N/A;    KNEE ARTHROSCOPY Right     LEFT VENTRICULAR ASSIST DEVICE Left 1/13/2021    Procedure: INSERTION- HeartMate 3 LEFT VENTRICULAR ASSIST DEVICE ;  Surgeon: Zenon Corona MD;  [facility];  Service: Cardiovascular;  Laterality: Left;    RECONSTRUCTION OF PERICARDIUM N/A 1/14/2021    Procedure: RECONSTRUCTION, PERICARDIUM;  Surgeon: Zenon Corona MD;  [facility];  Service: Cardiovascular;  Laterality: N/A;    RIGHT HEART CATHETERIZATION Right 11/2/2020    Procedure: INSERTION, CATHETER, RIGHT HEART;  Surgeon: Deana Lake MD;  Location: [facility] CATH LAB;  Service: Cardiology;  Laterality: Right;    STERNAL WOUND CLOSURE  1/13/2021    Procedure: TEMPORARY CLOSURE OF CHEST;  Surgeon: Zenon Corona MD;  Location: [facility] OR [address] [place] [address];  Service: Cardiovascular;;    STERNAL WOUND CLOSURE N/A 1/14/2021    Procedure: CLOSURE, WOUND, STERNUM;  Surgeon: Zenon Corona MD;  [address]  Service: Cardiovascular;  Laterality: N/A;       Time Tracking:     PT Received On: 01/29/22  PT Start Time: 0950     PT Stop Time: 1015  PT Total Time (min): 25 min     Billable Minutes: Evaluation 10 and Therapeutic Activity 15     Nahid Suggs PhD, DPT       01/29/2022

## 2022-01-29 NOTE — PLAN OF CARE
Problem: Physical Therapy Goal  Goal: Physical Therapy Goal  Description: Goals to be met by: 2022      Patient will increase functional independence with mobility by performin. Supine to sit with Modified [place]  2. Sit to stand transfer with Modified [place]  3. Bed to chair transfer with Modified [place] using the least restrictive device.   4. Gait  x 150 feet with Modified [place] using the least restrictive device.      Outcome: Ongoing, Progressing

## 2022-01-29 NOTE — SUBJECTIVE & OBJECTIVE
Interval History: No acute events.    Medications:  Continuous Infusions:   sodium chloride 0.9% Stopped (01/28/22 1529)    NORepinephrine bitartrate-D5W 0.012 mcg/kg/min (01/29/22 0900)    buffered 3% sodium acetate 130meq, sodium chloride 130meq, sterile water for inj IV soln 30 mL/hr at 01/29/22 0900     Scheduled Meds:   atorvastatin  80 mg Oral Daily    chlorhexidine  15 mL Mouth/Throat BID    famotidine (PF)  20 mg Intravenous BID    levetiracetam IV  500 mg Intravenous Q12H    remdesivir infusion  100 mg Intravenous Daily     PRN Meds:sodium chloride, dextrose 50%, glucagon (human recombinant), insulin aspart U-100     Review of Systems    Objective:     Weight: 82.4 kg (181 lb 10.5 oz)  Body mass index is 23.32 kg/m².  Vital Signs (Most Recent):  Temp: 98.5 °F (36.9 °C) (01/29/22 0700)  Pulse: 82 (01/29/22 1010)  Resp: (!) 23 (01/29/22 1010)  BP: (!) 105/59 (01/29/22 1010)  SpO2: 100 % (01/29/22 1010) Vital Signs (24h Range):  Temp:  [98.1 °F (36.7 °C)-98.8 °F (37.1 °C)] 98.5 °F (36.9 °C)  Pulse:  [64-92] 82  Resp:  [10-32] 23  SpO2:  [96 %-100 %] 100 %  BP: ()/(0-82) 105/59  Arterial Line BP: ()/(60-73) 74/64     Date 01/29/22 0700 - 01/30/22 0659   Shift 9092-3637 2840-9635 4490-1895 24 Hour Total   INTAKE   P.O. 120   120   I.V.(mL/kg) 99.4(1.2)   99.4(1.2)   Shift Total(mL/kg) 219.4(2.7)   219.4(2.7)   OUTPUT   Urine(mL/kg/hr) 185   185   Shift Total(mL/kg) 185(2.2)   185(2.2)   Weight (kg) 82.4 82.4 82.4 82.4              Vent Mode: Spont  Oxygen Concentration (%):  [3-35] 3  Resp Rate Total:  [13 br/min-19 br/min] 16 br/min  Vt Set:  [450 mL] 450 mL  PEEP/CPAP:  [5 cmH20] 5 cmH20  Pressure Support:  [10 cmH20] 10 cmH20  Mean Airway Pressure:  [8.3 mnG74-72 cmH20] 11 cmH20         NG/OG Tube 01/27/22 2140 18 Fr. Center mouth (Active)   $ NG/OG Tube Placement Complete 01/27/22 2130   Placement Check placement verified by x-ray;placement verified by aspirate characteristics 01/27/22  "2309   Tolerance no signs/symptoms of discomfort 01/27/22 2309   Securement secured to commercial device 01/27/22 2309   Clamp Status/Tolerance clamped 01/27/22 2309   Suction Setting/Drainage Method suction at the bedside 01/27/22 2309   Insertion Site Appearance no redness, warmth, tenderness, skin breakdown, drainage 01/27/22 2309   Flush/Irrigation flushed w/;water;no resistance met 01/27/22 2309            Urethral Catheter 01/27/22 1858 (Active)   $ Duarte Insertion Complete 01/27/22 1947   Site Assessment Clean;Intact 01/28/22 0300   Collection Container Urimeter 01/28/22 0300   Securement Method secured to top of thigh w/ adhesive device 01/28/22 0300   Catheter Care Performed no 01/28/22 0300   Reason for Continuing Urinary Catheterization Critically ill in ICU and requiring hourly monitoring of intake/output 01/28/22 0300   CAUTI Prevention Bundle StatLock in place w 1" slack;Intact seal between catheter & drainage tubing;Drainage bag/urimeter off the floor;Sheeting clip in use;No dependent loops or kinks;Drainage bag/urimeter not overfilled (<2/3 full);Drainage bag/urimeter below bladder 01/28/22 0300   Output (mL) 55 mL 01/28/22 1000       Physical Exam:    Constitutional: He appears well-nourished. No distress.     Eyes: Pupils are equal, round, and reactive to light. EOM are normal.     Cardiovascular: Normal rate and regular rhythm.     Abdominal: Soft.     Psych/Behavior: He is alert.     [id number]  Alert  Answers questions appropriately by nodding/shaking head  PERRL  EOMI  Face Symmetric  BUE 5/5  BLE 5/5  No drift      Significant Labs:  Recent Labs   Lab 01/27/22 2055 01/27/22  2055 01/28/22  0816 01/28/22  1257 01/29/22  0016 01/29/22  0442 01/29/22  0616   *  --  101  --   --  86  --    *   < > 133*   < > 136 139 139   K 4.6  --  4.0  --   --  3.8  --      --  101  --   --  106  --    CO2 22*  --  24  --   --  26  --    BUN 33*  --  33*  --   --  24*  --    CREATININE 1.3  --  " 1.2  --   --  1.0  --    CALCIUM 9.0  --  9.0  --   --  8.7  --    MG 2.0  --  1.9  --   --  1.9  --     < > = values in this interval not displayed.     Recent Labs   Lab 01/27/22 1914 01/28/22 0816 01/29/22 0442   WBC 10.41 8.14 7.13   HGB 10.4* 9.8* 9.5*   HCT 34.2* 31.4* 30.7*    181 181     Recent Labs   Lab 01/27/22 1914 01/27/22 2055 01/29/22  0442   INR 1.4* 1.3* 1.4*   APTT 25.7 31.8 32.4*     Microbiology Results (last 7 days)     ** No results found for the last 168 hours. **        All pertinent labs from the last 24 hours have been reviewed.    Significant Diagnostics:  I have reviewed and interpreted all pertinent imaging results/findings within the past 24 hours.

## 2022-01-29 NOTE — ASSESSMENT & PLAN NOTE
66M PMHx cardiomyopathy, s/p LVAD on coumadin and CAD s/p stenting on ASA, presenting after syncopal event and fall with hitting head, found to have bilateral SDH R>L on CT at OSH, rpt CT with new cisternal SAH, negative aneurysm or vascular malformation on CTA, likely nonaneurysmal perimesencephalic SAH    No acute events    -- CICU primary  -- q1 hour vitals/neurochecks  -- SBP < 140  -- Na > 135  -- Keppra 500 BID  -- INR 1.6 >> 1.3   -- Hold ASA/coumadin: no need for reversal at this time  -- All imaging reviewed   -- CTH 1/27 OSH: bilat aucte on chronic SDH, R > L, no shift, no hydro, cisterns open.    -- CTH 1/27 rpt: new SAH in interpeduncular and quadrigeminal cisterns   -- CTA 1/27: negative for aneurysm or vascular malformation   -- No recommendation for angiogram at this time, SAH likely related to venous plexus bleed  -- 3T on arrival, progressed to awake, alert and full strength, concern for seizures or postictal state at presentation   -- workup per NCC   -- AEDs per NCC

## 2022-01-29 NOTE — PROGRESS NOTES
[facility] - Cardiac Intensive Care  Neurosurgery  Progress Note    Subjective:     History of Present Illness: 66M PMHx cardiomyopathy, s/p LVAD on coumadin and CAD s/p stenting on ASA, presenting after fall, consulted to NSGY for SDH. Pt had syncopal event earlier today with prodrome of dizziness, fell, hit head, went to OSH for workup and was complaining of occipital H/A. At OSH, pt had acute neurologic decline, had to be intubated, and presents to [facility] intubated, sedated and with no motor activity. However, 2 hours after presentation to [facility], pt's neurologic status has improved to awake, alert, and following commands briskly with minor headache.       Post-Op Info:  * No surgery found *         Interval History: No acute events.    Medications:  Continuous Infusions:   sodium chloride 0.9% Stopped (01/28/22 1529)    NORepinephrine bitartrate-D5W 0.012 mcg/kg/min (01/29/22 0900)    buffered 3% sodium acetate 130meq, sodium chloride 130meq, sterile water for inj IV soln 30 mL/hr at 01/29/22 0900     Scheduled Meds:   atorvastatin  80 mg Oral Daily    chlorhexidine  15 mL Mouth/Throat BID    famotidine (PF)  20 mg Intravenous BID    levetiracetam IV  500 mg Intravenous Q12H    remdesivir infusion  100 mg Intravenous Daily     PRN Meds:sodium chloride, dextrose 50%, glucagon (human recombinant), insulin aspart U-100     Review of Systems    Objective:     Weight: 82.4 kg (181 lb 10.5 oz)  Body mass index is 23.32 kg/m².  Vital Signs (Most Recent):  Temp: 98.5 °F (36.9 °C) (01/29/22 0700)  Pulse: 82 (01/29/22 1010)  Resp: (!) 23 (01/29/22 1010)  BP: (!) 105/59 (01/29/22 1010)  SpO2: 100 % (01/29/22 1010) Vital Signs (24h Range):  Temp:  [98.1 °F (36.7 °C)-98.8 °F (37.1 °C)] 98.5 °F (36.9 °C)  Pulse:  [64-92] 82  Resp:  [10-32] 23  SpO2:  [96 %-100 %] 100 %  BP: ()/(0-82) 105/59  Arterial Line BP: ()/(60-73) 74/64     Date 01/29/22 0700 - 01/30/22 0659   Shift 0825-0746 4577-4334 8198-8468 24  "Hour Total   INTAKE   P.O. 120   120   I.V.(mL/kg) 99.4(1.2)   99.4(1.2)   Shift Total(mL/kg) 219.4(2.7)   219.4(2.7)   OUTPUT   Urine(mL/kg/hr) 185   185   Shift Total(mL/kg) 185(2.2)   185(2.2)   Weight (kg) 82.4 82.4 82.4 82.4              Vent Mode: Spont  Oxygen Concentration (%):  [3-35] 3  Resp Rate Total:  [13 br/min-19 br/min] 16 br/min  Vt Set:  [450 mL] 450 mL  PEEP/CPAP:  [5 cmH20] 5 cmH20  Pressure Support:  [10 cmH20] 10 cmH20  Mean Airway Pressure:  [8.3 jfK21-34 cmH20] 11 cmH20         NG/OG Tube 01/27/22 2140 18 Fr. Center mouth (Active)   $ NG/OG Tube Placement Complete 01/27/22 2130   Placement Check placement verified by x-ray;placement verified by aspirate characteristics 01/27/22 2309   Tolerance no signs/symptoms of discomfort 01/27/22 2309   Securement secured to commercial device 01/27/22 2309   Clamp Status/Tolerance clamped 01/27/22 2309   Suction Setting/Drainage Method suction at the bedside 01/27/22 2309   Insertion Site Appearance no redness, warmth, tenderness, skin breakdown, drainage 01/27/22 2309   Flush/Irrigation flushed w/;water;no resistance met 01/27/22 2309            Urethral Catheter 01/27/22 1858 (Active)   $ Duarte Insertion Complete 01/27/22 1947   Site Assessment Clean;Intact 01/28/22 0300   Collection Container Urimeter 01/28/22 0300   Securement Method secured to top of thigh w/ adhesive device 01/28/22 0300   Catheter Care Performed no 01/28/22 0300   Reason for Continuing Urinary Catheterization Critically ill in ICU and requiring hourly monitoring of intake/output 01/28/22 0300   CAUTI Prevention Bundle StatLock in place w 1" slack;Intact seal between catheter & drainage tubing;Drainage bag/urimeter off the floor;Sheeting clip in use;No dependent loops or kinks;Drainage bag/urimeter not overfilled (<2/3 full);Drainage bag/urimeter below bladder 01/28/22 0300   Output (mL) 55 mL 01/28/22 1000       Physical Exam:    Constitutional: He appears well-nourished. No " distress.     Eyes: Pupils are equal, round, and reactive to light. EOM are normal.     Cardiovascular: Normal rate and regular rhythm.     Abdominal: Soft.     Psych/Behavior: He is alert.     [id number]  Alert  Answers questions appropriately by nodding/shaking head  PERRL  EOMI  Face Symmetric  BUE 5/5  BLE 5/5  No drift      Significant Labs:  Recent Labs   Lab 01/27/22 2055 01/27/22 2055 01/28/22  0816 01/28/22  1257 01/29/22  0016 01/29/22  0442 01/29/22  0616   *  --  101  --   --  86  --    *   < > 133*   < > 136 139 139   K 4.6  --  4.0  --   --  3.8  --      --  101  --   --  106  --    CO2 22*  --  24  --   --  26  --    BUN 33*  --  33*  --   --  24*  --    CREATININE 1.3  --  1.2  --   --  1.0  --    CALCIUM 9.0  --  9.0  --   --  8.7  --    MG 2.0  --  1.9  --   --  1.9  --     < > = values in this interval not displayed.     Recent Labs   Lab 01/27/22 1914 01/28/22 0816 01/29/22 0442   WBC 10.41 8.14 7.13   HGB 10.4* 9.8* 9.5*   HCT 34.2* 31.4* 30.7*    181 181     Recent Labs   Lab 01/27/22 1914 01/27/22 2055 01/29/22 0442   INR 1.4* 1.3* 1.4*   APTT 25.7 31.8 32.4*     Microbiology Results (last 7 days)     ** No results found for the last 168 hours. **        All pertinent labs from the last 24 hours have been reviewed.    Significant Diagnostics:  I have reviewed and interpreted all pertinent imaging results/findings within the past 24 hours.    Assessment/Plan:     Subarachnoid hemorrhage  66M PMHx cardiomyopathy, s/p LVAD on coumadin and CAD s/p stenting on ASA, presenting after syncopal event and fall with hitting head, found to have bilateral SDH R>L on CT at OSH, rpt CT with new cisternal SAH, negative aneurysm or vascular malformation on CTA, likely nonaneurysmal perimesencephalic SAH    No acute events    -- CICU primary  -- q1 hour vitals/neurochecks  -- SBP < 140  -- Na > 135  -- Keppra 500 BID  -- INR 1.6 >> 1.3   -- Hold ASA/coumadin: no need for reversal  at this time  -- All imaging reviewed   -- CTH 1/27 OSH: bilat aucte on chronic SDH, R > L, no shift, no hydro, cisterns open.    -- CTH 1/27 rpt: new SAH in interpeduncular and quadrigeminal cisterns   -- CTA 1/27: negative for aneurysm or vascular malformation   -- No recommendation for angiogram at this time, SAH likely related to venous plexus bleed  -- 3T on arrival, progressed to awake, alert and full strength, concern for seizures or postictal state at presentation   -- workup per NCC   -- AEDs per NCC         Kuldip Arce MD  Neurosurgery  [facility] - Cardiac Intensive Care

## 2022-01-29 NOTE — PLAN OF CARE
Bedside swallow assessment completed. Recommend regular texture diet with thin liquids. Order requested from team. ST to f/u to ensure tolerance. Aracelis March CCC-SLP 1/29/2022 1:54 PM

## 2022-01-29 NOTE — PT/OT/SLP EVAL
Speech Language Pathology Evaluation  Bedside Swallow    Patient Name:  Rocco France   MRN:  [id number]  Admitting Diagnosis: Bilateral subdural hematomas    Recommendations:                 General Recommendations:  Dysphagia therapy  Diet recommendations:  Regular, Thin   Aspiration Precautions: Frequent oral care, HOB to 90 degrees, Remain upright 30 minutes post meal, Small bites/sips and Standard aspiration precautions   General Precautions: Standard, fall,LVAD  Communication strategies:  none    History:     Past Medical History:   Diagnosis Date    CLARISSE (acute kidney injury) 1/11/2021    CHF (congestive heart failure)     Chronic combined systolic and diastolic congestive heart failure 1/9/2021    Coronary artery disease     Diabetes mellitus     Hypertension     Kidney stones        Past Surgical History:   Procedure Laterality Date    CARDIAC CATHETERIZATION  2010    no stents    CARDIAC DEFIBRILLATOR PLACEMENT  2010    CARDIAC DEFIBRILLATOR PLACEMENT      HERNIA REPAIR      IRRIGATION OF MEDIASTINUM N/A 1/14/2021    Procedure: IRRIGATION, MEDIASTINUM;  Surgeon: Zenon Corona MD;  Location: [facility] OR [address] [place] [address];  Service: Cardiovascular;  Laterality: N/A;    KNEE ARTHROSCOPY Right     LEFT VENTRICULAR ASSIST DEVICE Left 1/13/2021    Procedure: INSERTION- HeartMate 3 LEFT VENTRICULAR ASSIST DEVICE ;  Surgeon: Zenon Corona MD;  Location: [facility] OR [address] [place] [address];  Service: Cardiovascular;  Laterality: Left;    RECONSTRUCTION OF PERICARDIUM N/A 1/14/2021    Procedure: RECONSTRUCTION, PERICARDIUM;  Surgeon: Zenon Corona MD;  Location: [facility] OR [address] [place] [address];  Service: Cardiovascular;  Laterality: N/A;    RIGHT HEART CATHETERIZATION Right 11/2/2020    Procedure: INSERTION, CATHETER, RIGHT HEART;  Surgeon: Deana Lake MD;  Location: [facility] CATH LAB;  Service: Cardiology;  Laterality: Right;    STERNAL WOUND CLOSURE  1/13/2021    Procedure: TEMPORARY CLOSURE OF CHEST;  Surgeon: Zenon Corona MD;  Location:  "[facility] OR 2ND FLR;  Service: Cardiovascular;;    STERNAL WOUND CLOSURE N/A 1/14/2021    Procedure: CLOSURE, WOUND, STERNUM;  Surgeon: Zenon Corona MD;  Location: [facility] OR 2ND FLR;  Service: Cardiovascular;  Laterality: N/A;       Prior Intubation HX:  1/27-1/28    Modified Barium Swallow: n/a    Chest X-Rays: 1/27-    Mild elevation of the right hemidiaphragm with diminished right lung volume.  No evidence of significant pulmonary disease.  No pleural effusion.  No pneumothorax.    Prior diet: regular    Subjective     "Is this my lunch?"    Spoke with RN prior to entering pt's room . Pt seen bedside for session. Alert and agreeable to ST.       Pain/Comfort:  · Pain Rating 1: 0/10  · Pain Rating Post-Intervention 1: 0/10    Respiratory Status: Room air    Objective:     Oral Musculature Evaluation  · Oral Musculature: WFL  · Secretion Management: adequate  · Mucosal Quality: adequate  · Volitional Cough: elicited  · Volitional Swallow: elicited  · Voice Prior to PO Intake: clear    Bedside Swallow Eval:   Consistencies Assessed:  · Thin liquids via straw  · Puree pudding  · Solids crackers     Oral Phase:   · WFL    Pharyngeal Phase:   · no overt clinical signs/symptoms of aspiration  · no overt clinical signs/symptoms of pharyngeal dysphagia    Compensatory Strategies  · None    Treatment: Provided education to pt re: role of ST, POC, swallow precautions, recommendations for regular consistency diet with thin liquids, and plan to f/u to ensure tolerance. He verbalized understanding. White board updated. RN and MD notified of results and recs.      Assessment:     Rocco France is a 66 y.o. male with an SLP diagnosis of functional oropharyngeal swallow.  Safe for regular diet with thin liquids. ST to f/u to ensure tolerance.    Goals:   Multidisciplinary Problems     SLP Goals        Problem: SLP Goal    Goal Priority Disciplines Outcome   SLP Goal     SLP    Description: Goals expected to be met by 2/5:  1. Pt " will tolerate least restrictive diet without overt s/sx airway threat.                          Plan:     · Patient to be seen:  3 x/week   · Plan of Care expires:  02/27/22  · Plan of Care reviewed with:  patient   · SLP Follow-Up:  Yes       Discharge recommendations:   (tbd)   Barriers to Discharge:  Level of Skilled Assistance Needed .    Time Tracking:     SLP Treatment Date:   01/29/22  Speech Start Time:  1115  Speech Stop Time:  1130     Speech Total Time (min):  15 min    Billable Minutes: Eval Swallow and Oral Function 7 and Self Care/Home Management Training 8    01/29/2022

## 2022-01-29 NOTE — NURSING
Spoke to HTS Fellow regarding clarification of MAP goal and Levophed.MAP goal is no less then 65 and no greater than 90. Okay to turn off levophed to maintain these parameters.    Jean-Claude Gatica RN

## 2022-01-29 NOTE — PT/OT/SLP EVAL
"Occupational Therapy   Co-Evaluation and Treatment with PT  Co-treat performed due to acuity and complexity of pt's medical status with 2 skilled disciplines needed to optimize pts functional performance in ICU setting.    Name: Rocco France  MRN: [id number]  Admitting Diagnosis:  Bilateral subdural hematomas    Length of Stay: 2 days    Recommendations:     Discharge Recommendations: rehabilitation facility  Discharge Equipment Recommendations:  other (see comments) (TBD pending progress)  Barriers to discharge:  Inaccessible home environment,Decreased caregiver support    Plan:     Patient to be seen 4 x/week to address the above listed problems via therapeutic activities,therapeutic exercises,self-care/home management  · Plan of Care Expires: 02/28/22  · Plan of Care Reviewed with: patient    Assessment:     Rocco France is a 66 y.o. male with a medical diagnosis of Bilateral subdural hematomas.  He presents with the following performance deficits affecting function: weakness,impaired endurance,impaired self care skills,impaired functional mobilty,gait instability,impaired balance,decreased safety awareness,pain,impaired cardiopulmonary response to activity.      Today's session limited by orthostatic hypotension with EOB/OOB activity, limiting patient's tolerance for activity. Pt below baseline and would benefit from continued OT services post dc via Rehab to maximize return to PLOF.     Rehab Prognosis: Good; patient would benefit from acute skilled OT services to address these deficits and reach maximum level of function.       Subjective   Communicated with: RN prior to session.  Patient found HOB elevated with arterial line,bed alarm,blood pressure cuff,EEG,oxygen,peripheral IV,pulse ox (continuous),telemetry,LVAD upon OT entry to room.    Chief Complaint: No chief complaint on file.    Patient/Family Comments/goals: "oh goodness, are you going to make me move?" and at end of session, "i'm sorry I " "couldn't do more today"     Pain/Comfort:  · Pain Rating 1: other (see comments) (no pain; sympomatic hypotension with EOB/OOB activity)  · Pain Addressed 1: Nurse notified,Reposition  · Pain Rating Post-Intervention 1: 0/10    Patients cultural, spiritual, Temple conflicts given the current situation: no    Occupational Profile:  Living Environment: pt lives with spouse in single story home, 3 step entrance, tub shower. Pt places fold-out chair beside tub and sponge bathes  Prior Level of Function: Patient reports being Ind with mobility & with ADLs. Not currently using any DME for mobility.   Patient uses DME as follows: walker, rolling,cane, straight (pt owns DME but does not use).   DME owned (not currently used): rolling walker and single point cane.  Roles/Repsonsibilities:   Hand Dominance: right   Work: no.   Drive: yes.   Managing Medicines/Managing Home: yes.   Hobbies: enjoys being active and independent; pt states he is always "helping someone out with something".  Equipment Used at Home:  walker, rolling,cane, straight (pt owns DME but does not use)    Patient reports they will have assistance from family upon discharge.      Objective:     Patient found with: arterial line,bed alarm,blood pressure cuff,EEG,oxygen,peripheral IV,pulse ox (continuous),telemetry,LVAD   General Precautions: Standard, Cardiac LVAD,fall   Orthopedic Precautions:N/A   Braces: N/A   Respiratory Status:    Room air  Vitals: BP (!) 116/58 (BP Location: Left arm, Patient Position: Lying)   Pulse 88   Temp 97.8 °F (36.6 °C) (Axillary)   Resp 13   Ht 6' 2" (1.88 m)   Wt 82.4 kg (181 lb 10.5 oz)   SpO2 100%   BMI 23.32 kg/m²     Cognitive and Psychosocial Function:   · AxOx4 --    · Follows Commands/attention:easily distracted by symptomatic hypotension with activity and follows one-step commands  · Communication:  clear/fluent  · Memory: No Deficits noted  · Safety awareness/insight to disability: intact "   · Mood/Affect/Coping skills/emotional control: Appropriate to situation and Cooperative    Hearing: Intact    Vision:  Intact visual fields    Physical Exam:  Postural examination/scapula alignment:    -       Rounded shoulders  -       Forward head  Skin integrity: Visible skin intact    BUE WFL for strength, sensation, GM/FM for use during functional tasks.     Occupational Performance:  Bed Mobility:    · Patient completed Rolling/Turning to Right with contact guard assistance  · Scooting to HOB in supine: maximal assistance and 2 persons  · Patient completed Supine to Sit with minimum assistance on R side of bed  · Scooting anteriorly to EOB to have both feet planted on floor: minimum assistance  · Patient completed Sit to Supine with minimum assistance on R side of bed    Functional Mobility/Transfers:   Static Sitting EOB: CGA; dizzy initially, pt endorsed improvement with time   Dynamic Sitting EOB: CGA   Patient completed Sit <> Stand Transfer with minimum assistance and of 2 persons  with  hand-held assist    Static Standing Balance: Min A x2 person, HH assist; standing tolerance limited by onset of orthostatic hypotension      Vitals monitored during Session, Per MD SBP<140  Orthostatic BP:  BP Position   103/69 (84) HOB elevated upon entry   74/64 (69) Returned to bed, HOB elevated.   Of note, unable to get reading in standing, likely 2* hypotension. Pt symptomatic.    MAP 83 With RN at bedside upon OT exit       Activities of Daily Living:  · Feeding:  modified independence set up, HOB elevated  · Grooming: modified independence at bed level, pt unable to tolerated in standing 2* hypotension, BUE WFL  · Upper Body Dressing: minimum assistance donning gown like robe seated EOB  · Lower Body Dressing: maximal assistance at bed level donning socks  · LVAD management:  Pt reported no deficits or difficulty with managing LVAD, switching wall power <>battery. No sensory, fine motor or gross motor  deficits impeding safety with LVAD. Patient independent in LVAD management with social support available as needed from family.     [facility] 6 Click ADL:  AMPAC Total Score: 16    Treatment & Education:  -OT POC, safety during ADLs and mobility   -Education on energy conservation and task modification to maximize safety and independence  -Questions answered within OT scope of practice.      Patient left with bed in chair position with all lines intact, call button in reach, bed alarm on, RN notified and   present; LVAD on wall power throughout session    GOALS:   Multidisciplinary Problems     Occupational Therapy Goals        Problem: Occupational Therapy Goal    Goal Priority Disciplines Outcome Interventions   Occupational Therapy Goal     OT, PT/OT Ongoing, Progressing    Description: Goals set on 1/29 with expiration date 2/12:  Patient will increase functional independence with ADLs by performing:    Supine <> Sit with [place].  Grooming while standing at sink with Mod [place] using DME as needed.   UB Dressing with [place].  LB Dressing with [place].  Step transfer with Mod [place] with DME as needed.  Pt will demonstrate understanding of education provided regarding energy conservation and task modification through teach-back method.                      History:     Past Medical History:   Diagnosis Date    CLARISSE (acute kidney injury) 1/11/2021    CHF (congestive heart failure)     Chronic combined systolic and diastolic congestive heart failure 1/9/2021    Coronary artery disease     Diabetes mellitus     Hypertension     Kidney stones        Past Surgical History:   Procedure Laterality Date    CARDIAC CATHETERIZATION  2010    no stents    CARDIAC DEFIBRILLATOR PLACEMENT  2010    CARDIAC DEFIBRILLATOR PLACEMENT      HERNIA REPAIR      IRRIGATION OF MEDIASTINUM N/A 1/14/2021    Procedure: IRRIGATION, MEDIASTINUM;  Surgeon: Zenon Corona MD;  Location: [facility] OR [address] [place] [address];   Service: Cardiovascular;  Laterality: N/A;    KNEE ARTHROSCOPY Right     LEFT VENTRICULAR ASSIST DEVICE Left 1/13/2021    Procedure: INSERTION- HeartMate 3 LEFT VENTRICULAR ASSIST DEVICE ;  Surgeon: Zenon Corona MD;  [facility];  Service: Cardiovascular;  Laterality: Left;    RECONSTRUCTION OF PERICARDIUM N/A 1/14/2021    Procedure: RECONSTRUCTION, PERICARDIUM;  Surgeon: Zenon Corona MD;  [facility];  Service: Cardiovascular;  Laterality: N/A;    RIGHT HEART CATHETERIZATION Right 11/2/2020    Procedure: INSERTION, CATHETER, RIGHT HEART;  Surgeon: Deana Lake MD;  Location: [facility] CATH LAB;  Service: Cardiology;  Laterality: Right;    STERNAL WOUND CLOSURE  1/13/2021    Procedure: TEMPORARY CLOSURE OF CHEST;  Surgeon: Zenon Corona MD;  [facility];  Service: Cardiovascular;;    STERNAL WOUND CLOSURE N/A 1/14/2021    Procedure: CLOSURE, WOUND, STERNUM;  Surgeon: Zenon Corona MD;  [address]  Service: Cardiovascular;  Laterality: N/A;       Time Tracking:       OT Date of Treatment: 01/29/22  OT Start Time: 0950  OT Stop Time: 1015  OT Total Time (min): 25 min  Additional staff present: PT      Billable Minutes:Evaluation 10  Self Care/Home Management 15      1/29/2022

## 2022-01-29 NOTE — PROGRESS NOTES
01/29/2022  Vanessa Ramires    Current provider:  Deana Lake MD    Device interrogation:  TXP LVAD INTERROGATIONS 1/29/2022 1/29/2022 1/29/2022 1/29/2022 1/29/2022 1/29/2022 1/29/2022   Type - - HeartMate3 HeartMate3 HeartMate3 HeartMate3 HeartMate3   Flow 3.7 3.5 3.8 3.9 3.8 3.8 3.9   Speed 5000 5000 5000 5050 5000 5000 5000   PI 7.0 8.4 6.9 6.0 5.6 6.2 5.8   Power (Edwards) 3.3 3.4 3.3 3.4 3.3 3.3 3.3   LSL 4600 4600 4600 4600 4600 4600 4600   Pulsatility Intermittent pulse Intermittent pulse Intermittent pulse Intermittent pulse Intermittent pulse Intermittent pulse Intermittent pulse          Rounded on Rocco France to ensure all mechanical assist device settings (IABP or VAD) were appropriate and all parameters were within limits.  I was able to ensure all back up equipment was present, the staff had no issues, and the Perfusion Department daily rounding was complete.      For implantable VADs: Interrogation of Ventricular assist device was performed with analysis of device parameters and review of device function. I have personally reviewed the interrogation findings and agree with findings as stated.     In emergency, the nursing units have been notified to contact the perfusion department either by:  Calling [phone] from 630am to 4pm Mon thru Fri, utilizing the On-Call Finder functionality of Epic and searching for Perfusion, or by contacting the hospital  from 4pm to 630am and on weekends and asking to speak with the perfusionist on call.    10:41 AM

## 2022-01-29 NOTE — PLAN OF CARE
Patient is extubated this evening.  Currently saturating about 95 on 3 L nasal cannula.  On hypertonic saline to keep sodium above 135. Off Levophed this evening.  Past bedside swallow and started on diet.  EEG study is normal but the patient is on Keppra.     Plan  CT head in the morning as per neurosurgery

## 2022-01-29 NOTE — PROCEDURES
ICU EEG/VIDEO MONITORING REPORT    Rocco France  [id number]  1955    DATE OF SERVICE: 1/28-29/2022    DATE OF ADMISSION: 1/27/2022  6:20 PM    ADMITTING PROVIDER: Deana Lake MD    METHODOLOGY   Electroencephalographic (EEG) recording is with electrodes placed according to the International 10-20 placement system.  Thirty two (32) channels of digital signal are simultaneously recorded from the scalp and may include EKG, EMG, and/or eye monitors.   Recording band pass was 0.1 to 512 hz.  Digital video recording of the patient is simultaneously recorded with the EEG.  The nursing staff report clinical symptoms and may press an event button when the patient has symptoms of clinical interest to the treating physicians.  EEG and video recording is stored and archived in digital format.  The entire recording is visually reviewed and the times identified by computer analysis as being spikes or seizures are reviewed again.  Activation procedures which include photic stimulation, hyperventilation and instructing patients to perform simple task are done in selected patients.   Compresses spectral analysis (CSA) is also performed on the activity recorded from each individual channel.  This is displayed as a power display of frequencies from 0 to 30 Hz over time.   The CSA analysis is done and displayed continuously.  This is reviewed for asymmetries in power between homologous areas of the scalp and for presence of changes in power which canbe seen when seizures occur.  Sections of suspected abnormalities on the CSA is then compared with the original EEG recording.     Accumetrics software was also utilized in the review of this study.  This software suite analyzes the EEG recording in multiple domains.  Coherence and rhythmicity is computed to identify EEG sections which may contain organized seizures.  Each channel undergoes analysis to detect presence of spike and sharp waves which have special and morphological  characteristic of epileptic activity.  The routine EEG recording is converted from spacial into frequency domain.  This is then displayed comparing homologous areas to identify areas of significant asymmetry.  Algorithm to identify non-cortically generated artifact is used to separate eye movement, EMG and other artifact from the EEG.      Recording Times  Start on 1/28/2022, 13:35  Stop on 1/29/2022, 18:16    A total of 28 hours and 41 minutes of EEG was recorded.    EEG FINDINGS  Background activity:   After extubation, the background rhythm was characterized by low amplitude (<30 uV) alpha (8-9 Hz) activity with a 9 Hz posterior dominant alpha rhythm at 30-70 microvolts.   Symmetry and continuity: the background was continuous and symmetric    Sleep:   Normal sleep transients including sleep spindles, K complexes, vertex waves and POSTS were seen.    Activation procedures:   Not done    Abnormal activity:   No epileptiform discharges, periodic discharges, lateralized rhythmic delta activity or electrographic seizures were seen.    EKG:   Irregular rhythm seen.    IMPRESSION:   This C-EEG done without sedation is normal.  Irregular heart rhythm was seen on EKG.    CLINICAL CORRELATION IS RECOMMENDED.    Pamella Izaguirre MD, AUSTIN(), FACNS, CHARISSA.  Neurology-Epilepsy.  [facility].

## 2022-01-29 NOTE — ASSESSMENT & PLAN NOTE
-S/P DT HM3 1/13/21  -Current speed 5000  -INR goal 2.0-3.0. Current INR 1.4. Coumadin and ASA remain on hold  -LDH stable    Procedure: Device Interrogation Including analysis of device parameters  Current Settings: Ventricular Assist Device  Review of device function is stable/unstable stable         TXP LVAD INTERROGATIONS 1/29/2022 1/29/2022 1/29/2022 1/29/2022 1/29/2022 1/29/2022 1/29/2022   Type - - - HeartMate3 HeartMate3 HeartMate3 HeartMate3   Flow 3.7 3.7 3.5 3.8 3.9 3.8 3.8   Speed 5000 5000 5000 5000 5050 5000 5000   PI 7.9 7.0 8.4 6.9 6.0 5.6 6.2   Power (Edwards) 3.5 3.3 3.4 3.3 3.4 3.3 3.3   LSL 4600 4600 4600 4600 4600 4600 4600   Pulsatility Intermittent pulse Intermittent pulse Intermittent pulse Intermittent pulse Intermittent pulse Intermittent pulse Intermittent pulse

## 2022-01-29 NOTE — PROGRESS NOTES
[facility] - Cardiac Intensive Care  Heart Transplant  Progress Note    Patient Name: Rocco France  MRN: [id number]  Admission Date: 1/27/2022  Hospital Length of Stay: 2 days  Attending Physician: Deana Lake MD  Primary Care Provider: Teresa Mendoza NP  Principal Problem:Bilateral subdural hematomas    Subjective:     Interval History:  No events overnight.  EEG was negative for seizures.     Continuous Infusions:   sodium chloride 0.9% Stopped (01/28/22 1529)    NORepinephrine bitartrate-D5W Stopped (01/29/22 1048)    buffered 3% sodium acetate 130meq, sodium chloride 130meq, sterile water for inj IV soln 30 mL/hr at 01/29/22 1100     Scheduled Meds:   atorvastatin  80 mg Oral Daily    chlorhexidine  15 mL Mouth/Throat BID    famotidine (PF)  20 mg Intravenous BID    levetiracetam IV  500 mg Intravenous Q12H    remdesivir infusion  100 mg Intravenous Daily     PRN Meds:sodium chloride, dextrose 50%, glucagon (human recombinant), insulin aspart U-100    Review of patient's allergies indicates:   Allergen Reactions    Pcn [penicillins] Hives     Objective:     Vital Signs (Most Recent):  Temp: 97.8 °F (36.6 °C) (01/29/22 1100)  Pulse: 66 (01/29/22 1100)  Resp: 11 (01/29/22 1100)  BP: (!) 116/58 (01/29/22 1113)  SpO2: 100 % (01/29/22 1100) Vital Signs (24h Range):  Temp:  [97.8 °F (36.6 °C)-98.8 °F (37.1 °C)] 97.8 °F (36.6 °C)  Pulse:  [64-92] 66  Resp:  [10-23] 11  SpO2:  [96 %-100 %] 100 %  BP: ()/(0-82) 116/58  Arterial Line BP: ()/(60-82) 103/75     Patient Vitals for the past 72 hrs (Last 3 readings):   Weight   01/27/22 2000 82.4 kg (181 lb 10.5 oz)     Body mass index is 23.32 kg/m².      Intake/Output Summary (Last 24 hours) at 1/29/2022 1159  Last data filed at 1/29/2022 1100  Gross per 24 hour   Intake 1439.48 ml   Output 1253 ml   Net 186.48 ml       Hemodynamic Parameters:       Telemetry: NSR     Physical Exam  Constitutional:       Appearance: Normal appearance.   HENT:       Head: Normocephalic.      Nose: Nose normal.   Cardiovascular:      Rate and Rhythm: Normal rate and regular rhythm.      Pulses: Normal pulses.      Heart sounds: Normal heart sounds.      Comments: VAD hum present  Pulmonary:      Effort: Pulmonary effort is normal.      Breath sounds: Normal breath sounds.   Abdominal:      General: Abdomen is flat. Bowel sounds are normal.      Palpations: Abdomen is soft.   Musculoskeletal:         General: Normal range of motion.      Cervical back: Normal range of motion.   Skin:     General: Skin is warm.      Capillary Refill: Capillary refill takes less than 2 seconds.   Neurological:      General: No focal deficit present.      Mental Status: He is alert and oriented to person, place, and time.         Significant Labs:  CBC:  Recent Labs   Lab 01/27/22 1914 01/28/22  0816 01/29/22  0442   WBC 10.41 8.14 7.13   RBC 3.62* 3.44* 3.31*   HGB 10.4* 9.8* 9.5*   HCT 34.2* 31.4* 30.7*    181 181   MCV 95 91 93   MCH 28.7 28.5 28.7   MCHC 30.4* 31.2* 30.9*     BNP:  Recent Labs   Lab 01/27/22 2055   *     CMP:  Recent Labs   Lab 01/27/22 2055 01/27/22  2055 01/28/22  0816 01/28/22  1257 01/29/22  0016 01/29/22  0442 01/29/22  0616   *  --  101  --   --  86  --    CALCIUM 9.0  --  9.0  --   --  8.7  --    ALBUMIN 2.6*  --   --   --   --   --   --    PROT 8.7*  --   --   --   --   --   --    *   < > 133*   < > 136 139 139   K 4.6  --  4.0  --   --  3.8  --    CO2 22*  --  24  --   --  26  --      --  101  --   --  106  --    BUN 33*  --  33*  --   --  24*  --    CREATININE 1.3  --  1.2  --   --  1.0  --    ALKPHOS 120  --   --   --   --   --   --    ALT 16  --   --   --   --   --   --    AST 38  --   --   --   --   --   --    BILITOT 1.6*  --   --   --   --   --   --     < > = values in this interval not displayed.      Coagulation:   Recent Labs   Lab 01/27/22 1914 01/27/22 2055 01/29/22 0442   INR 1.4* 1.3* 1.4*   APTT 25.7 31.8 32.4*      LDH:  Recent Labs   Lab 01/27/22 2055 01/29/22  0442    207     Microbiology:  Microbiology Results (last 7 days)     ** No results found for the last 168 hours. **          I have reviewed all pertinent labs within the past 24 hours.    Estimated Creatinine Clearance: 84.5 mL/min (based on SCr of 1 mg/dL).    Diagnostic Results:  I have reviewed and interpreted all pertinent imaging results/findings within the past 24 hours.    Assessment and Plan:     67 yo BM with stage D CHF due to NICMP s/p HM3, s/p ICD, HTN, HLD, T2DM was transferred from outside hospital emergency room after he was found to have subdural hematoma/subarachnoid hemorrhage.  Patient presented to the ER after he was found to have a syncopal event.  Also patient had respiratory distress on arrival for which he was intubated.  Patient was transferred here for further evaluation.  On arrival patient is intubated and is on propofol.  Neurosurgery and Neuro Critical Care were immediately informed.  Neurosurgery recommended to repeat CT after reviewing the prior CT done in the ER in [place].  INR on arrival is 1.6.  He also had a low flow with a increased PI around 4:00 p.m. this evening.  According to wife patient was complaining of headache for the last 2 weeks..  [place] went [place] post office this [place] and she does not know what exactly happened..  ICD was interrogated and did not show any VT/VF.       * Bilateral subdural hematomas  -Patient had a traumatic subdural, subarachnoid hemorrhage after fall on 1/27. Currently no focal deficits  -NCC and NSGY following. Plans for serial imagine pending  -INR 1.4 on admit, 1.3 this morning. Coumadin/ASA on hold  -Patient had elevated maps above 90 on arrival, but overnight required addition of low dose Levophed to keep MAP > 65  -Goal NA+ > 135, 133 this morning. Awaiting NCC input  -Keppra started as AED. EEG pending  -Likely will require angiogram per NSGY    1/29   Rpt CT as per NSy  EEG  negative for Seizures   On hypertonic saline to keep Sodium  >135     Acute respiratory failure requiring reintubation  -Intubated prior to transfer for respiratory distress  -Patient is currently on contact isolation for reported +ve Covid test at OSH prior to transfer. CXR unremarkable. Repeating Covid test  -Currently alert and on minimal vent settings. Awaiting NCC input regarding SBT/extubation    1/29  Extubated yesterday and doing good on 3 lit NC       COVID  Patient is on Remdesvir as per ID recs     LVAD (left ventricular assist device) present  -S/P DT HM3 1/13/21  -Current speed 5000  -INR goal 2.0-3.0. Current INR 1.4. Coumadin and ASA remain on hold  -LDH stable    Procedure: Device Interrogation Including analysis of device parameters  Current Settings: Ventricular Assist Device  Review of device function is stable/unstable stable         TXP LVAD INTERROGATIONS 1/29/2022 1/29/2022 1/29/2022 1/29/2022 1/29/2022 1/29/2022 1/29/2022   Type - - - HeartMate3 HeartMate3 HeartMate3 HeartMate3   Flow 3.7 3.7 3.5 3.8 3.9 3.8 3.8   Speed 5000 5000 5000 5000 5050 5000 5000   PI 7.9 7.0 8.4 6.9 6.0 5.6 6.2   Power (Edwards) 3.5 3.3 3.4 3.3 3.4 3.3 3.3   LSL 4600 4600 4600 4600 4600 4600 4600   Pulsatility Intermittent pulse Intermittent pulse Intermittent pulse Intermittent pulse Intermittent pulse Intermittent pulse Intermittent pulse       Type 2 diabetes mellitus without complication  -SSI  ACHS     Essential hypertension  -Initially thought to need Cardene, but he is now on low dose Levo to keep MAP > 65  -All home antihypertensives are on hold for now    1/29  Levo turned off       Sean Campbell MD  Heart Transplant  [facility] - Cardiac Intensive [facility]

## 2022-01-29 NOTE — PLAN OF CARE
Problem: Occupational Therapy Goal  Goal: Occupational Therapy Goal  Description: Goals set on 1/29 with expiration date 2/12:  Patient will increase functional independence with ADLs by performing:    Supine <> Sit with Moody.  Grooming while standing at sink with Mod Moody using DME as needed.   UB Dressing with Moody.  LB Dressing with Moody.  Step transfer with Mod Moody with DME as needed.  Pt will demonstrate understanding of education provided regarding energy conservation and task modification through teach-back method.     Outcome: Ongoing, Progressing       Eval complete. Pt tolerated session well. Initiate OT POC.      Occupational Therapy recommends an Inpatient Rehab facility upon discharge to maximize return to PLOF and address deficits listed above.

## 2022-01-30 LAB
ANION GAP SERPL CALC-SCNC: 6 MMOL/L (ref 8–16)
APTT BLDCRRT: 36 SEC (ref 21–32)
BASOPHILS # BLD AUTO: 0.01 K/UL (ref 0–0.2)
BASOPHILS NFR BLD: 0.1 % (ref 0–1.9)
BUN SERPL-MCNC: 19 MG/DL (ref 8–23)
CALCIUM SERPL-MCNC: 8.4 MG/DL (ref 8.7–10.5)
CHLORIDE SERPL-SCNC: 109 MMOL/L (ref 95–110)
CO2 SERPL-SCNC: 28 MMOL/L (ref 23–29)
CREAT SERPL-MCNC: 0.9 MG/DL (ref 0.5–1.4)
DIFFERENTIAL METHOD: ABNORMAL
EOSINOPHIL # BLD AUTO: 0.1 K/UL (ref 0–0.5)
EOSINOPHIL NFR BLD: 0.9 % (ref 0–8)
ERYTHROCYTE [DISTWIDTH] IN BLOOD BY AUTOMATED COUNT: 14.6 % (ref 11.5–14.5)
EST. GFR  (AFRICAN AMERICAN): >60 ML/MIN/1.73 M^2
EST. GFR  (NON AFRICAN AMERICAN): >60 ML/MIN/1.73 M^2
GLUCOSE SERPL-MCNC: 107 MG/DL (ref 70–110)
HCT VFR BLD AUTO: 30.9 % (ref 40–54)
HGB BLD-MCNC: 9.5 G/DL (ref 14–18)
IMM GRANULOCYTES # BLD AUTO: 0.02 K/UL (ref 0–0.04)
IMM GRANULOCYTES NFR BLD AUTO: 0.3 % (ref 0–0.5)
INR PPP: 1.5 (ref 0.8–1.2)
LDH SERPL L TO P-CCNC: 208 U/L (ref 110–260)
LYMPHOCYTES # BLD AUTO: 1.7 K/UL (ref 1–4.8)
LYMPHOCYTES NFR BLD: 25.7 % (ref 18–48)
MAGNESIUM SERPL-MCNC: 2 MG/DL (ref 1.6–2.6)
MCH RBC QN AUTO: 28.5 PG (ref 27–31)
MCHC RBC AUTO-ENTMCNC: 30.7 G/DL (ref 32–36)
MCV RBC AUTO: 93 FL (ref 82–98)
MONOCYTES # BLD AUTO: 0.8 K/UL (ref 0.3–1)
MONOCYTES NFR BLD: 12.1 % (ref 4–15)
NEUTROPHILS # BLD AUTO: 4.1 K/UL (ref 1.8–7.7)
NEUTROPHILS NFR BLD: 60.9 % (ref 38–73)
NRBC BLD-RTO: 0 /100 WBC
PHOSPHATE SERPL-MCNC: 1.7 MG/DL (ref 2.7–4.5)
PLATELET # BLD AUTO: 161 K/UL (ref 150–450)
PMV BLD AUTO: 10.5 FL (ref 9.2–12.9)
POCT GLUCOSE: 133 MG/DL (ref 70–110)
POCT GLUCOSE: 138 MG/DL (ref 70–110)
POCT GLUCOSE: 139 MG/DL (ref 70–110)
POCT GLUCOSE: 191 MG/DL (ref 70–110)
POTASSIUM SERPL-SCNC: 4 MMOL/L (ref 3.5–5.1)
PROTHROMBIN TIME: 16.4 SEC (ref 9–12.5)
RBC # BLD AUTO: 3.33 M/UL (ref 4.6–6.2)
SODIUM SERPL-SCNC: 138 MMOL/L (ref 136–145)
SODIUM SERPL-SCNC: 139 MMOL/L (ref 136–145)
SODIUM SERPL-SCNC: 141 MMOL/L (ref 136–145)
SODIUM SERPL-SCNC: 142 MMOL/L (ref 136–145)
SODIUM SERPL-SCNC: 143 MMOL/L (ref 136–145)
WBC # BLD AUTO: 6.78 K/UL (ref 3.9–12.7)

## 2022-01-30 PROCEDURE — 93750 INTERROGATION VAD IN PERSON: CPT | Mod: ,,, | Performed by: INTERNAL MEDICINE

## 2022-01-30 PROCEDURE — C1751 CATH, INF, PER/CENT/MIDLINE: HCPCS

## 2022-01-30 PROCEDURE — 93750 PR INTERROGATE VENT ASSIST DEV, IN PERSON, W PHYSICIAN ANALYSIS: ICD-10-PCS | Mod: ,,, | Performed by: INTERNAL MEDICINE

## 2022-01-30 PROCEDURE — 85025 COMPLETE CBC W/AUTO DIFF WBC: CPT | Performed by: HOSPITALIST

## 2022-01-30 PROCEDURE — 84100 ASSAY OF PHOSPHORUS: CPT | Performed by: HOSPITALIST

## 2022-01-30 PROCEDURE — 99291 PR CRITICAL CARE, E/M 30-74 MINUTES: ICD-10-PCS | Mod: ,,, | Performed by: INTERNAL MEDICINE

## 2022-01-30 PROCEDURE — 63600175 PHARM REV CODE 636 W HCPCS: Performed by: NURSE PRACTITIONER

## 2022-01-30 PROCEDURE — 20000000 HC ICU ROOM

## 2022-01-30 PROCEDURE — 85610 PROTHROMBIN TIME: CPT | Performed by: HOSPITALIST

## 2022-01-30 PROCEDURE — 63600175 PHARM REV CODE 636 W HCPCS: Performed by: PSYCHIATRY & NEUROLOGY

## 2022-01-30 PROCEDURE — 83735 ASSAY OF MAGNESIUM: CPT | Performed by: HOSPITALIST

## 2022-01-30 PROCEDURE — 85730 THROMBOPLASTIN TIME PARTIAL: CPT | Performed by: HOSPITALIST

## 2022-01-30 PROCEDURE — 27000207 HC ISOLATION

## 2022-01-30 PROCEDURE — 99292 PR CRITICAL CARE, ADDL 30 MIN: ICD-10-PCS | Mod: ,,, | Performed by: INTERNAL MEDICINE

## 2022-01-30 PROCEDURE — 25000003 PHARM REV CODE 250: Performed by: PSYCHIATRY & NEUROLOGY

## 2022-01-30 PROCEDURE — 27200188 HC TRANSDUCER, ART ADULT/PEDS

## 2022-01-30 PROCEDURE — 80048 BASIC METABOLIC PNL TOTAL CA: CPT | Performed by: HOSPITALIST

## 2022-01-30 PROCEDURE — 25000003 PHARM REV CODE 250: Performed by: NURSE PRACTITIONER

## 2022-01-30 PROCEDURE — A4217 STERILE WATER/SALINE, 500 ML: HCPCS | Performed by: PSYCHIATRY & NEUROLOGY

## 2022-01-30 PROCEDURE — 25000003 PHARM REV CODE 250: Performed by: HOSPITALIST

## 2022-01-30 PROCEDURE — 99292 CRITICAL CARE ADDL 30 MIN: CPT | Mod: ,,, | Performed by: INTERNAL MEDICINE

## 2022-01-30 PROCEDURE — 25000003 PHARM REV CODE 250: Performed by: STUDENT IN AN ORGANIZED HEALTH CARE EDUCATION/TRAINING PROGRAM

## 2022-01-30 PROCEDURE — 83615 LACTATE (LD) (LDH) ENZYME: CPT | Performed by: HOSPITALIST

## 2022-01-30 PROCEDURE — 99291 CRITICAL CARE FIRST HOUR: CPT | Mod: ,,, | Performed by: INTERNAL MEDICINE

## 2022-01-30 PROCEDURE — 36620 INSERTION CATHETER ARTERY: CPT

## 2022-01-30 PROCEDURE — 84295 ASSAY OF SERUM SODIUM: CPT | Performed by: PSYCHIATRY & NEUROLOGY

## 2022-01-30 PROCEDURE — 27000248 HC VAD-ADDITIONAL DAY

## 2022-01-30 RX ORDER — FAMOTIDINE 20 MG/1
20 TABLET, FILM COATED ORAL 2 TIMES DAILY
Status: DISCONTINUED | OUTPATIENT
Start: 2022-01-30 | End: 2022-02-17 | Stop reason: HOSPADM

## 2022-01-30 RX ORDER — SODIUM,POTASSIUM PHOSPHATES 280-250MG
2 POWDER IN PACKET (EA) ORAL ONCE
Status: COMPLETED | OUTPATIENT
Start: 2022-01-30 | End: 2022-01-30

## 2022-01-30 RX ORDER — LEVETIRACETAM 500 MG/1
500 TABLET ORAL 2 TIMES DAILY
Status: COMPLETED | OUTPATIENT
Start: 2022-01-30 | End: 2022-02-03

## 2022-01-30 RX ADMIN — ACETAMINOPHEN 650 MG: 325 TABLET ORAL at 09:01

## 2022-01-30 RX ADMIN — REMDESIVIR 100 MG: 100 INJECTION, POWDER, LYOPHILIZED, FOR SOLUTION INTRAVENOUS at 09:01

## 2022-01-30 RX ADMIN — CHLORHEXIDINE GLUCONATE 0.12% ORAL RINSE 15 ML: 1.2 LIQUID ORAL at 09:01

## 2022-01-30 RX ADMIN — FAMOTIDINE 20 MG: 10 INJECTION, SOLUTION INTRAVENOUS at 09:01

## 2022-01-30 RX ADMIN — ACETAMINOPHEN 650 MG: 325 TABLET ORAL at 01:01

## 2022-01-30 RX ADMIN — SODIUM ACETATE: 164 INJECTION, SOLUTION, CONCENTRATE INTRAVENOUS at 04:01

## 2022-01-30 RX ADMIN — LEVETIRACETAM 500 MG: 500 TABLET, FILM COATED ORAL at 08:01

## 2022-01-30 RX ADMIN — CHLORHEXIDINE GLUCONATE 0.12% ORAL RINSE 15 ML: 1.2 LIQUID ORAL at 08:01

## 2022-01-30 RX ADMIN — POTASSIUM & SODIUM PHOSPHATES POWDER PACK 280-160-250 MG 2 PACKET: 280-160-250 PACK at 06:01

## 2022-01-30 RX ADMIN — ATORVASTATIN CALCIUM 80 MG: 20 TABLET, FILM COATED ORAL at 09:01

## 2022-01-30 RX ADMIN — FAMOTIDINE 20 MG: 20 TABLET ORAL at 08:01

## 2022-01-30 RX ADMIN — LEVETIRACETAM 500 MG: 500 INJECTION, SOLUTION INTRAVENOUS at 09:01

## 2022-01-30 NOTE — SUBJECTIVE & OBJECTIVE
Interval History:  No events overnight. Feeling well this morning other than a mild headache. Headache hasn't changed much in past few days and improves with Tylenol. Still on RA and no SOB, cough, etc.      Continuous Infusions:   sodium chloride 0.9% 5 mL/hr at 01/30/22 1300    NORepinephrine bitartrate-D5W Stopped (01/29/22 1048)    buffered 3% sodium acetate 130meq, sodium chloride 130meq, sterile water for inj IV soln 30 mL/hr at 01/30/22 1300     Scheduled Meds:   atorvastatin  80 mg Oral Daily    chlorhexidine  15 mL Mouth/Throat BID    famotidine  20 mg Oral BID    levETIRAcetam  500 mg Oral BID     PRN Meds:sodium chloride, acetaminophen, dextrose 50%, glucagon (human recombinant), insulin aspart U-100    Review of patient's allergies indicates:   Allergen Reactions    Pcn [penicillins] Hives     Objective:     Vital Signs (Most Recent):  Temp: 98.3 °F (36.8 °C) (01/30/22 1100)  Pulse: 98 (01/30/22 1300)  Resp: 18 (01/30/22 1300)  BP: (!) 94/0 (01/30/22 1100)  SpO2: 95 % (01/30/22 1300) Vital Signs (24h Range):  Temp:  [98 °F (36.7 °C)-98.6 °F (37 °C)] 98.3 °F (36.8 °C)  Pulse:  [] 98  Resp:  [11-23] 18  SpO2:  [91 %-100 %] 95 %  BP: (82-99)/(0-70) 94/0  Arterial Line BP: ()/(56-80) 99/77     Patient Vitals for the past 72 hrs (Last 3 readings):   Weight   01/30/22 0600 84.5 kg (186 lb 4.6 oz)   01/27/22 2000 82.4 kg (181 lb 10.5 oz)     Body mass index is 23.92 kg/m².      Intake/Output Summary (Last 24 hours) at 1/30/2022 1424  Last data filed at 1/30/2022 1300  Gross per 24 hour   Intake 2938.64 ml   Output 1090 ml   Net 1848.64 ml       Hemodynamic Parameters:        Telemetry: NSR     Physical Exam  Constitutional:       Appearance: Normal appearance.   HENT:      Head: Normocephalic.      Nose: Nose normal.   Cardiovascular:      Rate and Rhythm: Normal rate and regular rhythm.      Pulses: Normal pulses.      Heart sounds: Normal heart sounds.      Comments: VAD hum  present  Pulmonary:      Effort: Pulmonary effort is normal.      Breath sounds: Normal breath sounds.   Abdominal:      General: Abdomen is flat. Bowel sounds are normal.      Palpations: Abdomen is soft.   Musculoskeletal:         General: Normal range of motion.      Cervical back: Normal range of motion.   Skin:     General: Skin is warm.      Capillary Refill: Capillary refill takes less than 2 seconds.   Neurological:      General: No focal deficit present.      Mental Status: He is alert and oriented to person, place, and time.         Significant Labs:  CBC:  Recent Labs   Lab 01/28/22 0816 01/29/22  0442 01/30/22  0335   WBC 8.14 7.13 6.78   RBC 3.44* 3.31* 3.33*   HGB 9.8* 9.5* 9.5*   HCT 31.4* 30.7* 30.9*    181 161   MCV 91 93 93   MCH 28.5 28.7 28.5   MCHC 31.2* 30.9* 30.7*     BNP:  Recent Labs   Lab 01/27/22 2055   *     CMP:  Recent Labs   Lab 01/27/22 2055 01/27/22 2055 01/28/22  0816 01/28/22  1257 01/29/22  0442 01/29/22  0616 01/29/22  1409 01/29/22  1754 01/30/22  0335 01/30/22  0340 01/30/22  1234   *   < > 101  --  86  --   --   --  107  --   --    CALCIUM 9.0   < > 9.0  --  8.7  --   --   --  8.4*  --   --    ALBUMIN 2.6*  --   --   --   --   --   --   --   --   --   --    PROT 8.7*  --   --   --   --   --   --   --   --   --   --    *   < > 133*   < > 139   < >  --    < > 143 142 139   K 4.6   < > 4.0   < > 3.8  --  4.1  --  4.0  --   --    CO2 22*   < > 24  --  26  --   --   --  28  --   --       < > 101  --  106  --   --   --  109  --   --    BUN 33*   < > 33*  --  24*  --   --   --  19  --   --    CREATININE 1.3   < > 1.2  --  1.0  --   --   --  0.9  --   --    ALKPHOS 120  --   --   --   --   --   --   --   --   --   --    ALT 16  --   --   --   --   --   --   --   --   --   --    AST 38  --   --   --   --   --   --   --   --   --   --    BILITOT 1.6*  --   --   --   --   --   --   --   --   --   --     < > = values in this interval not displayed.       Coagulation:   Recent Labs   Lab 01/27/22 2055 01/29/22 0442 01/30/22  0335   INR 1.3* 1.4* 1.5*   APTT 31.8 32.4* 36.0*     LDH:  Recent Labs   Lab 01/27/22 2055 01/29/22 0442 01/30/22  0335    207 208     Microbiology:  Microbiology Results (last 7 days)     ** No results found for the last 168 hours. **          I have reviewed all pertinent labs within the past 24 hours.    Estimated Creatinine Clearance: 93.9 mL/min (based on SCr of 0.9 mg/dL).    Diagnostic Results:  I have reviewed and interpreted all pertinent imaging results/findings within the past 24 hours.

## 2022-01-30 NOTE — ASSESSMENT & PLAN NOTE
-S/P DT HM3 1/13/21  -Current speed 5000  -INR goal 2.0-3.0. Current INR 1.5. Coumadin and ASA remain on hold  -LDH stable    Procedure: Device Interrogation Including analysis of device parameters  Current Settings: Ventricular Assist Device  Review of device function is stable/unstable stable         TXP LVAD INTERROGATIONS 1/30/2022 1/30/2022 1/30/2022 1/30/2022 1/30/2022 1/30/2022 1/30/2022   Type - - - - - - HeartMate3   Flow 3.6 3.5 4.1 3.9 3.7 3.7 3.7   Speed 5000 5000 5000 5000 5000 5000 5000   PI 8.2 7. 5.2 6.0 7.3 7.7 7.0   Power (Edwards) 3.3 7.8 3.4 3.4 3.4 3.5 3.4   LSL 4600 4600 4600 4600 4600 4600 4600   Pulsatility Intermittent pulse Intermittent pulse Intermittent pulse Intermittent pulse Intermittent pulse Intermittent pulse Intermittent pulse

## 2022-01-30 NOTE — ASSESSMENT & PLAN NOTE
-Patient had a traumatic subdural, subarachnoid hemorrhage after fall on 1/27. Currently no focal deficits  -NCC and NSGY following. Plans for serial imagine pending  -INR 1.4 on admit, 1.3 this morning. Coumadin/ASA on hold  -Patient had elevated maps above 90 on arrival, but overnight required addition of low dose Levophed to keep MAP > 65  -Goal NA+ > 135, 133 this morning. Awaiting NCC input  -Keppra started as AED. EEG pending  -Likely will require angiogram per NSGY    1/29   Rpt CT as per NSy  EEG negative for Seizures   On hypertonic saline to keep Sodium  >135     1/30  Continue hypertonic saline  Appreciate NSGY recs regarding reducing frequency on neuro checks in appropriate due to clinical stability

## 2022-01-30 NOTE — EICU
Comments: Unable to send any Naveon to patient's daughter or spouse, states on both unable to receive.

## 2022-01-30 NOTE — ASSESSMENT & PLAN NOTE
-Intubated prior to transfer for respiratory distress  -Patient is currently on contact isolation for reported +ve Covid test at OSH prior to transfer. CXR unremarkable. Repeat COVID test positive  - Extubated 1/28 and on RA  - Continue Remdesivir

## 2022-01-30 NOTE — PLAN OF CARE
CICU DAILY GOALS       A: Awake    RASS: Goal - RASS Goal: 0-->alert and calm  Actual - RASS (Jackson Agitation-Sedation Scale): 0-->alert and calm   Restraint necessity: Clinical Justification: Removing medical devices  B: Breath   SBT: Not intubated   C: Coordinate A & B, analgesics/sedatives   Pain: managed    SAT: Not intubated  D: Delirium   CAM-ICU: Overall CAM-ICU: Negative  E: Early(intubated/ Progressive (non-intubated) Mobility   MOVE Screen: Pass   Activity: Activity Management: Arm raise - L1,Leg kicks - L2  FAS: Feeding/Nutrition   Diet order: Diet/Nutrition Received: restrict fluids,2 gram sodium,low saturated fat/low cholesterol,   Fluid restriction:    T: Thrombus   DVT prophylaxis: VTE Required Core Measure: Per order contraindicated for SCDs/Anticoagulants  H: HOB Elevation   Head of Bed (HOB) Positioning: HOB at 60 degrees  U: Ulcer Prophylaxis   GI: yes  G: Glucose control   managed Glycemic Management: blood glucose monitored  S: Skin   Bundle compliance: yes   Bathing/Skin Care: bath, complete,incontinence care,linen changed,dressed/undressed,electrode patches/site rotation Date: 1/30/22  B: Bowel Function   no issues   I: Indwelling Catheters   Duarte necessity: [REMOVED]      Urethral Catheter 01/27/22 9518-Reason for Continuing Urinary Catheterization: Critically ill in ICU and requiring hourly monitoring of intake/output   CVC necessity: No   IPAD offered: No  D: De-escalation Antibx   No  Plan for the day  - Duarte discontinued  - Continue to monitor sodium levels      Family/Goals of care/Code Status   Code Status: Full Code     No acute events throughout day, VS and assessment per flow sheet, patient progressing towards goals as tolerated, plan of care reviewed with Rocco France and family, all concerns addressed, will continue to monitor.

## 2022-01-30 NOTE — PLAN OF CARE
Pt afebrile and A&O x4. Duarte output of 600 ml. LVAD  Flow 5000 with site dry and intact. Doppler bp in the 80s. Not on levo anymore. Phos replaced this am.

## 2022-01-30 NOTE — PROGRESS NOTES
[facility] - Cardiac Intensive Care  Heart Transplant  Progress Note    Patient Name: Rocco France  MRN: [id number]  Admission Date: 1/27/2022  Hospital Length of Stay: 3 days  Attending Physician: Deana Lake MD  Primary Care Provider: Teresa Mendoza NP  Principal Problem:Bilateral subdural hematomas    Subjective:     Interval History:  No events overnight. Feeling well this morning other than a mild headache. Headache hasn't changed much in past few days and improves with Tylenol. Still on RA and no SOB, cough, etc.      Continuous Infusions:   sodium chloride 0.9% 5 mL/hr at 01/30/22 1300    NORepinephrine bitartrate-D5W Stopped (01/29/22 1048)    buffered 3% sodium acetate 130meq, sodium chloride 130meq, sterile water for inj IV soln 30 mL/hr at 01/30/22 1300     Scheduled Meds:   atorvastatin  80 mg Oral Daily    chlorhexidine  15 mL Mouth/Throat BID    famotidine  20 mg Oral BID    levETIRAcetam  500 mg Oral BID     PRN Meds:sodium chloride, acetaminophen, dextrose 50%, glucagon (human recombinant), insulin aspart U-100    Review of patient's allergies indicates:   Allergen Reactions    Pcn [penicillins] Hives     Objective:     Vital Signs (Most Recent):  Temp: 98.3 °F (36.8 °C) (01/30/22 1100)  Pulse: 98 (01/30/22 1300)  Resp: 18 (01/30/22 1300)  BP: (!) 94/0 (01/30/22 1100)  SpO2: 95 % (01/30/22 1300) Vital Signs (24h Range):  Temp:  [98 °F (36.7 °C)-98.6 °F (37 °C)] 98.3 °F (36.8 °C)  Pulse:  [] 98  Resp:  [11-23] 18  SpO2:  [91 %-100 %] 95 %  BP: (82-99)/(0-70) 94/0  Arterial Line BP: ()/(56-80) 99/77     Patient Vitals for the past 72 hrs (Last 3 readings):   Weight   01/30/22 0600 84.5 kg (186 lb 4.6 oz)   01/27/22 2000 82.4 kg (181 lb 10.5 oz)     Body mass index is 23.92 kg/m².      Intake/Output Summary (Last 24 hours) at 1/30/2022 1424  Last data filed at 1/30/2022 1300  Gross per 24 hour   Intake 2938.64 ml   Output 1090 ml   Net 1848.64 ml       Hemodynamic Parameters:         Telemetry: NSR     Physical Exam  Constitutional:       Appearance: Normal appearance.   HENT:      Head: Normocephalic.      Nose: Nose normal.   Cardiovascular:      Rate and Rhythm: Normal rate and regular rhythm.      Pulses: Normal pulses.      Heart sounds: Normal heart sounds.      Comments: VAD hum present  Pulmonary:      Effort: Pulmonary effort is normal.      Breath sounds: Normal breath sounds.   Abdominal:      General: Abdomen is flat. Bowel sounds are normal.      Palpations: Abdomen is soft.   Musculoskeletal:         General: Normal range of motion.      Cervical back: Normal range of motion.   Skin:     General: Skin is warm.      Capillary Refill: Capillary refill takes less than 2 seconds.   Neurological:      General: No focal deficit present.      Mental Status: He is alert and oriented to person, place, and time.         Significant Labs:  CBC:  Recent Labs   Lab 01/28/22  0816 01/29/22 0442 01/30/22  0335   WBC 8.14 7.13 6.78   RBC 3.44* 3.31* 3.33*   HGB 9.8* 9.5* 9.5*   HCT 31.4* 30.7* 30.9*    181 161   MCV 91 93 93   MCH 28.5 28.7 28.5   MCHC 31.2* 30.9* 30.7*     BNP:  Recent Labs   Lab 01/27/22 2055   *     CMP:  Recent Labs   Lab 01/27/22 2055 01/27/22 2055 01/28/22  0816 01/28/22  1257 01/29/22  0442 01/29/22  0616 01/29/22  1409 01/29/22  1754 01/30/22  0335 01/30/22  0340 01/30/22  1234   *   < > 101  --  86  --   --   --  107  --   --    CALCIUM 9.0   < > 9.0  --  8.7  --   --   --  8.4*  --   --    ALBUMIN 2.6*  --   --   --   --   --   --   --   --   --   --    PROT 8.7*  --   --   --   --   --   --   --   --   --   --    *   < > 133*   < > 139   < >  --    < > 143 142 139   K 4.6   < > 4.0   < > 3.8  --  4.1  --  4.0  --   --    CO2 22*   < > 24  --  26  --   --   --  28  --   --       < > 101  --  106  --   --   --  109  --   --    BUN 33*   < > 33*  --  24*  --   --   --  19  --   --    CREATININE 1.3   < > 1.2  --  1.0  --   --   --   0.9  --   --    ALKPHOS 120  --   --   --   --   --   --   --   --   --   --    ALT 16  --   --   --   --   --   --   --   --   --   --    AST 38  --   --   --   --   --   --   --   --   --   --    BILITOT 1.6*  --   --   --   --   --   --   --   --   --   --     < > = values in this interval not displayed.      Coagulation:   Recent Labs   Lab 01/27/22 2055 01/29/22 0442 01/30/22  0335   INR 1.3* 1.4* 1.5*   APTT 31.8 32.4* 36.0*     LDH:  Recent Labs   Lab 01/27/22 2055 01/29/22 0442 01/30/22  0335    207 208     Microbiology:  Microbiology Results (last 7 days)     ** No results found for the last 168 hours. **          I have reviewed all pertinent labs within the past 24 hours.    Estimated Creatinine Clearance: 93.9 mL/min (based on SCr of 0.9 mg/dL).    Diagnostic Results:  I have reviewed and interpreted all pertinent imaging results/findings within the past 24 hours.    Assessment and Plan:     67 yo BM with stage D CHF due to NICMP s/p HM3, s/p ICD, HTN, HLD, T2DM was transferred from outside hospital emergency room after he was found to have subdural hematoma/subarachnoid hemorrhage.  Patient presented to the ER after he was found to have a syncopal event.  Also patient had respiratory distress on arrival for which he was intubated.  Patient was transferred here for further evaluation.  On arrival patient is intubated and is on propofol.  Neurosurgery and Neuro Critical Care were immediately informed.  Neurosurgery recommended to repeat CT after reviewing the prior CT done in the ER in [place].  INR on arrival is 1.6.  He also had a low flow with a increased PI around 4:00 p.m. this evening.  According to wife patient was complaining of headache for the last 2 weeks..  [place] went [place] post office this [place] and she does not know what exactly happened..  ICD was interrogated and did not show any VT/VF.       * Bilateral subdural hematomas  -Patient had a traumatic subdural, subarachnoid  hemorrhage after fall on 1/27. Currently no focal deficits  -NCC and NSGY following. Plans for serial imagine pending  -INR 1.4 on admit, 1.3 this morning. Coumadin/ASA on hold  -Patient had elevated maps above 90 on arrival, but overnight required addition of low dose Levophed to keep MAP > 65  -Goal NA+ > 135, 133 this morning. Awaiting NCC input  -Keppra started as AED. EEG pending  -Likely will require angiogram per NSGY    1/29   Rpt CT as per NSy  EEG negative for Seizures   On hypertonic saline to keep Sodium  >135     1/30  Continue hypertonic saline  Appreciate NSGY recs regarding reducing frequency on neuro checks in appropriate due to clinical stability    Acute respiratory failure requiring reintubation  -Intubated prior to transfer for respiratory distress  -Patient is currently on contact isolation for reported +ve Covid test at OSH prior to transfer. CXR unremarkable. Repeat COVID test positive  - Extubated 1/28 and on RA  - Continue Remdesivir    LVAD (left ventricular assist device) present  -S/P DT HM3 1/13/21  -Current speed 5000  -INR goal 2.0-3.0. Current INR 1.5. Coumadin and ASA remain on hold  -LDH stable    Procedure: Device Interrogation Including analysis of device parameters  Current Settings: Ventricular Assist Device  Review of device function is stable/unstable stable         TXP LVAD INTERROGATIONS 1/30/2022 1/30/2022 1/30/2022 1/30/2022 1/30/2022 1/30/2022 1/30/2022   Type - - - - - - HeartMate3   Flow 3.6 3.5 4.1 3.9 3.7 3.7 3.7   Speed 5000 5000 5000 5000 5000 5000 5000   PI 8.2 7. 5.2 6.0 7.3 7.7 7.0   Power (Edwards) 3.3 7.8 3.4 3.4 3.4 3.5 3.4   LSL 4600 4600 4600 4600 4600 4600 4600   Pulsatility Intermittent pulse Intermittent pulse Intermittent pulse Intermittent pulse Intermittent pulse Intermittent pulse Intermittent pulse       Type 2 diabetes mellitus without complication  -SSI  ACHS     Essential hypertension  -Levophed off. MAP goal >65  -All home antihypertensives are on  hold for now      Jasvir Alvarez MD  Heart Transplant  [facility] - Cardiac [facility]

## 2022-01-31 LAB
ALBUMIN SERPL BCP-MCNC: 2.2 G/DL (ref 3.5–5.2)
ALP SERPL-CCNC: 102 U/L (ref 55–135)
ALT SERPL W/O P-5'-P-CCNC: 11 U/L (ref 10–44)
ANION GAP SERPL CALC-SCNC: 8 MMOL/L (ref 8–16)
APTT BLDCRRT: 30.4 SEC (ref 21–32)
AST SERPL-CCNC: 24 U/L (ref 10–40)
BASOPHILS # BLD AUTO: 0.01 K/UL (ref 0–0.2)
BASOPHILS NFR BLD: 0.2 % (ref 0–1.9)
BILIRUB DIRECT SERPL-MCNC: 0.4 MG/DL (ref 0.1–0.3)
BILIRUB SERPL-MCNC: 0.9 MG/DL (ref 0.1–1)
BNP SERPL-MCNC: 586 PG/ML (ref 0–99)
BUN SERPL-MCNC: 15 MG/DL (ref 8–23)
CALCIUM SERPL-MCNC: 8.2 MG/DL (ref 8.7–10.5)
CHLORIDE SERPL-SCNC: 112 MMOL/L (ref 95–110)
CO2 SERPL-SCNC: 22 MMOL/L (ref 23–29)
CREAT SERPL-MCNC: 0.8 MG/DL (ref 0.5–1.4)
CRP SERPL-MCNC: 35.6 MG/L (ref 0–8.2)
DIFFERENTIAL METHOD: ABNORMAL
EOSINOPHIL # BLD AUTO: 0.1 K/UL (ref 0–0.5)
EOSINOPHIL NFR BLD: 1.4 % (ref 0–8)
ERYTHROCYTE [DISTWIDTH] IN BLOOD BY AUTOMATED COUNT: 14.8 % (ref 11.5–14.5)
EST. GFR  (AFRICAN AMERICAN): >60 ML/MIN/1.73 M^2
EST. GFR  (NON AFRICAN AMERICAN): >60 ML/MIN/1.73 M^2
GLUCOSE SERPL-MCNC: 131 MG/DL (ref 70–110)
HCT VFR BLD AUTO: 31 % (ref 40–54)
HGB BLD-MCNC: 9.2 G/DL (ref 14–18)
IMM GRANULOCYTES # BLD AUTO: 0.03 K/UL (ref 0–0.04)
IMM GRANULOCYTES NFR BLD AUTO: 0.5 % (ref 0–0.5)
INR PPP: 1.5 (ref 0.8–1.2)
LDH SERPL L TO P-CCNC: 390 U/L (ref 110–260)
LYMPHOCYTES # BLD AUTO: 1.9 K/UL (ref 1–4.8)
LYMPHOCYTES NFR BLD: 28 % (ref 18–48)
MAGNESIUM SERPL-MCNC: 1.6 MG/DL (ref 1.6–2.6)
MCH RBC QN AUTO: 28.6 PG (ref 27–31)
MCHC RBC AUTO-ENTMCNC: 29.7 G/DL (ref 32–36)
MCV RBC AUTO: 96 FL (ref 82–98)
MONOCYTES # BLD AUTO: 0.6 K/UL (ref 0.3–1)
MONOCYTES NFR BLD: 9.2 % (ref 4–15)
NEUTROPHILS # BLD AUTO: 4 K/UL (ref 1.8–7.7)
NEUTROPHILS NFR BLD: 60.7 % (ref 38–73)
NRBC BLD-RTO: 0 /100 WBC
PHOSPHATE SERPL-MCNC: 1.7 MG/DL (ref 2.7–4.5)
PLATELET # BLD AUTO: 115 K/UL (ref 150–450)
PMV BLD AUTO: 11.3 FL (ref 9.2–12.9)
POCT GLUCOSE: 104 MG/DL (ref 70–110)
POCT GLUCOSE: 129 MG/DL (ref 70–110)
POCT GLUCOSE: 136 MG/DL (ref 70–110)
POCT GLUCOSE: 142 MG/DL (ref 70–110)
POCT GLUCOSE: 177 MG/DL (ref 70–110)
POTASSIUM SERPL-SCNC: 4.1 MMOL/L (ref 3.5–5.1)
PREALB SERPL-MCNC: 8 MG/DL (ref 20–43)
PROT SERPL-MCNC: 7.1 G/DL (ref 6–8.4)
PROTHROMBIN TIME: 16.5 SEC (ref 9–12.5)
RBC # BLD AUTO: 3.22 M/UL (ref 4.6–6.2)
SODIUM SERPL-SCNC: 132 MMOL/L (ref 136–145)
SODIUM SERPL-SCNC: 136 MMOL/L (ref 136–145)
SODIUM SERPL-SCNC: 138 MMOL/L (ref 136–145)
SODIUM SERPL-SCNC: 139 MMOL/L (ref 136–145)
SODIUM SERPL-SCNC: 142 MMOL/L (ref 136–145)
WBC # BLD AUTO: 6.64 K/UL (ref 3.9–12.7)

## 2022-01-31 PROCEDURE — 86140 C-REACTIVE PROTEIN: CPT | Performed by: HOSPITALIST

## 2022-01-31 PROCEDURE — 25000003 PHARM REV CODE 250: Performed by: HOSPITALIST

## 2022-01-31 PROCEDURE — 63600175 PHARM REV CODE 636 W HCPCS: Performed by: STUDENT IN AN ORGANIZED HEALTH CARE EDUCATION/TRAINING PROGRAM

## 2022-01-31 PROCEDURE — 84134 ASSAY OF PREALBUMIN: CPT | Performed by: HOSPITALIST

## 2022-01-31 PROCEDURE — 99231 PR SUBSEQUENT HOSPITAL CARE,LEVL I: ICD-10-PCS | Mod: GC,,, | Performed by: PSYCHIATRY & NEUROLOGY

## 2022-01-31 PROCEDURE — 85730 THROMBOPLASTIN TIME PARTIAL: CPT | Performed by: HOSPITALIST

## 2022-01-31 PROCEDURE — 99231 SBSQ HOSP IP/OBS SF/LOW 25: CPT | Mod: GC,,, | Performed by: PSYCHIATRY & NEUROLOGY

## 2022-01-31 PROCEDURE — 83735 ASSAY OF MAGNESIUM: CPT | Mod: 91 | Performed by: PSYCHIATRY & NEUROLOGY

## 2022-01-31 PROCEDURE — 25000003 PHARM REV CODE 250: Performed by: INTERNAL MEDICINE

## 2022-01-31 PROCEDURE — 99292 PR CRITICAL CARE, ADDL 30 MIN: ICD-10-PCS | Mod: ,,, | Performed by: INTERNAL MEDICINE

## 2022-01-31 PROCEDURE — 99292 CRITICAL CARE ADDL 30 MIN: CPT | Mod: ,,, | Performed by: INTERNAL MEDICINE

## 2022-01-31 PROCEDURE — 85610 PROTHROMBIN TIME: CPT | Performed by: HOSPITALIST

## 2022-01-31 PROCEDURE — 84295 ASSAY OF SERUM SODIUM: CPT | Mod: 91 | Performed by: PSYCHIATRY & NEUROLOGY

## 2022-01-31 PROCEDURE — 94761 N-INVAS EAR/PLS OXIMETRY MLT: CPT

## 2022-01-31 PROCEDURE — 99291 PR CRITICAL CARE, E/M 30-74 MINUTES: ICD-10-PCS | Mod: ,,, | Performed by: PHYSICIAN ASSISTANT

## 2022-01-31 PROCEDURE — 25000003 PHARM REV CODE 250: Performed by: STUDENT IN AN ORGANIZED HEALTH CARE EDUCATION/TRAINING PROGRAM

## 2022-01-31 PROCEDURE — 99233 PR SUBSEQUENT HOSPITAL CARE,LEVL III: ICD-10-PCS | Mod: GC,,, | Performed by: NEUROLOGICAL SURGERY

## 2022-01-31 PROCEDURE — 85025 COMPLETE CBC W/AUTO DIFF WBC: CPT | Performed by: HOSPITALIST

## 2022-01-31 PROCEDURE — 99233 SBSQ HOSP IP/OBS HIGH 50: CPT | Mod: GC,,, | Performed by: NEUROLOGICAL SURGERY

## 2022-01-31 PROCEDURE — 97535 SELF CARE MNGMENT TRAINING: CPT

## 2022-01-31 PROCEDURE — 25000003 PHARM REV CODE 250: Performed by: PHYSICIAN ASSISTANT

## 2022-01-31 PROCEDURE — 92526 ORAL FUNCTION THERAPY: CPT

## 2022-01-31 PROCEDURE — 93750 INTERROGATION VAD IN PERSON: CPT | Mod: ,,, | Performed by: INTERNAL MEDICINE

## 2022-01-31 PROCEDURE — A4217 STERILE WATER/SALINE, 500 ML: HCPCS | Performed by: PSYCHIATRY & NEUROLOGY

## 2022-01-31 PROCEDURE — 63600175 PHARM REV CODE 636 W HCPCS: Performed by: PSYCHIATRY & NEUROLOGY

## 2022-01-31 PROCEDURE — 84100 ASSAY OF PHOSPHORUS: CPT | Performed by: HOSPITALIST

## 2022-01-31 PROCEDURE — 83615 LACTATE (LD) (LDH) ENZYME: CPT | Performed by: HOSPITALIST

## 2022-01-31 PROCEDURE — 25000003 PHARM REV CODE 250: Performed by: PSYCHIATRY & NEUROLOGY

## 2022-01-31 PROCEDURE — 97530 THERAPEUTIC ACTIVITIES: CPT

## 2022-01-31 PROCEDURE — 27000207 HC ISOLATION

## 2022-01-31 PROCEDURE — 99291 CRITICAL CARE FIRST HOUR: CPT | Mod: ,,, | Performed by: PHYSICIAN ASSISTANT

## 2022-01-31 PROCEDURE — 27000248 HC VAD-ADDITIONAL DAY

## 2022-01-31 PROCEDURE — 93750 PR INTERROGATE VENT ASSIST DEV, IN PERSON, W PHYSICIAN ANALYSIS: ICD-10-PCS | Mod: ,,, | Performed by: INTERNAL MEDICINE

## 2022-01-31 PROCEDURE — 80048 BASIC METABOLIC PNL TOTAL CA: CPT | Performed by: HOSPITALIST

## 2022-01-31 PROCEDURE — 83880 ASSAY OF NATRIURETIC PEPTIDE: CPT | Performed by: HOSPITALIST

## 2022-01-31 PROCEDURE — 84295 ASSAY OF SERUM SODIUM: CPT | Performed by: PSYCHIATRY & NEUROLOGY

## 2022-01-31 PROCEDURE — 83735 ASSAY OF MAGNESIUM: CPT | Performed by: HOSPITALIST

## 2022-01-31 PROCEDURE — 80076 HEPATIC FUNCTION PANEL: CPT | Performed by: HOSPITALIST

## 2022-01-31 PROCEDURE — 20000000 HC ICU ROOM

## 2022-01-31 RX ORDER — HYDRALAZINE HYDROCHLORIDE 20 MG/ML
10 INJECTION INTRAMUSCULAR; INTRAVENOUS ONCE
Status: COMPLETED | OUTPATIENT
Start: 2022-01-31 | End: 2022-01-31

## 2022-01-31 RX ORDER — MAGNESIUM SULFATE HEPTAHYDRATE 40 MG/ML
2 INJECTION, SOLUTION INTRAVENOUS ONCE
Status: COMPLETED | OUTPATIENT
Start: 2022-01-31 | End: 2022-01-31

## 2022-01-31 RX ORDER — HYDRALAZINE HYDROCHLORIDE 20 MG/ML
20 INJECTION INTRAMUSCULAR; INTRAVENOUS ONCE
Status: COMPLETED | OUTPATIENT
Start: 2022-01-31 | End: 2022-01-31

## 2022-01-31 RX ORDER — MUPIROCIN 20 MG/G
OINTMENT TOPICAL 2 TIMES DAILY
Status: COMPLETED | OUTPATIENT
Start: 2022-01-31 | End: 2022-02-05

## 2022-01-31 RX ORDER — LISINOPRIL 5 MG/1
5 TABLET ORAL 2 TIMES DAILY
Status: DISCONTINUED | OUTPATIENT
Start: 2022-01-31 | End: 2022-01-31

## 2022-01-31 RX ORDER — HYDRALAZINE HYDROCHLORIDE 10 MG/1
10 TABLET, FILM COATED ORAL ONCE
Status: COMPLETED | OUTPATIENT
Start: 2022-01-31 | End: 2022-01-31

## 2022-01-31 RX ORDER — LISINOPRIL 10 MG/1
10 TABLET ORAL 2 TIMES DAILY
Status: DISCONTINUED | OUTPATIENT
Start: 2022-01-31 | End: 2022-02-06

## 2022-01-31 RX ORDER — SODIUM,POTASSIUM PHOSPHATES 280-250MG
1 POWDER IN PACKET (EA) ORAL ONCE
Status: COMPLETED | OUTPATIENT
Start: 2022-01-31 | End: 2022-01-31

## 2022-01-31 RX ADMIN — HYDRALAZINE HYDROCHLORIDE 10 MG: 20 INJECTION, SOLUTION INTRAMUSCULAR; INTRAVENOUS at 06:01

## 2022-01-31 RX ADMIN — ACETAMINOPHEN 650 MG: 325 TABLET ORAL at 04:01

## 2022-01-31 RX ADMIN — LISINOPRIL 5 MG: 5 TABLET ORAL at 10:01

## 2022-01-31 RX ADMIN — ACETAMINOPHEN 650 MG: 325 TABLET ORAL at 10:01

## 2022-01-31 RX ADMIN — POTASSIUM & SODIUM PHOSPHATES POWDER PACK 280-160-250 MG 1 PACKET: 280-160-250 PACK at 06:01

## 2022-01-31 RX ADMIN — ATORVASTATIN CALCIUM 80 MG: 20 TABLET, FILM COATED ORAL at 08:01

## 2022-01-31 RX ADMIN — MUPIROCIN: 20 OINTMENT TOPICAL at 11:01

## 2022-01-31 RX ADMIN — LEVETIRACETAM 500 MG: 500 TABLET, FILM COATED ORAL at 09:01

## 2022-01-31 RX ADMIN — CHLORHEXIDINE GLUCONATE 0.12% ORAL RINSE 15 ML: 1.2 LIQUID ORAL at 09:01

## 2022-01-31 RX ADMIN — LEVETIRACETAM 500 MG: 500 TABLET, FILM COATED ORAL at 08:01

## 2022-01-31 RX ADMIN — HYDRALAZINE HYDROCHLORIDE 20 MG: 20 INJECTION, SOLUTION INTRAMUSCULAR; INTRAVENOUS at 11:01

## 2022-01-31 RX ADMIN — MAGNESIUM SULFATE HEPTAHYDRATE 2 G: 40 INJECTION, SOLUTION INTRAVENOUS at 06:01

## 2022-01-31 RX ADMIN — FAMOTIDINE 20 MG: 20 TABLET ORAL at 09:01

## 2022-01-31 RX ADMIN — LISINOPRIL 10 MG: 10 TABLET ORAL at 09:01

## 2022-01-31 RX ADMIN — SODIUM ACETATE: 164 INJECTION, SOLUTION, CONCENTRATE INTRAVENOUS at 09:01

## 2022-01-31 RX ADMIN — CHLORHEXIDINE GLUCONATE 0.12% ORAL RINSE 15 ML: 1.2 LIQUID ORAL at 08:01

## 2022-01-31 RX ADMIN — HYDRALAZINE HYDROCHLORIDE 10 MG: 10 TABLET, FILM COATED ORAL at 02:01

## 2022-01-31 RX ADMIN — ACETAMINOPHEN 650 MG: 325 TABLET ORAL at 11:01

## 2022-01-31 RX ADMIN — FAMOTIDINE 20 MG: 20 TABLET ORAL at 08:01

## 2022-01-31 NOTE — SUBJECTIVE & OBJECTIVE
Interval History: No acute events.    Medications:  Continuous Infusions:   sodium chloride 0.9% Stopped (01/31/22 0651)    NORepinephrine bitartrate-D5W Stopped (01/29/22 1048)    buffered 3% sodium acetate 130meq, sodium chloride 130meq, sterile water for inj IV soln 30 mL/hr at 01/31/22 0800     Scheduled Meds:   atorvastatin  80 mg Oral Daily    chlorhexidine  15 mL Mouth/Throat BID    famotidine  20 mg Oral BID    levETIRAcetam  500 mg Oral BID     PRN Meds:sodium chloride, acetaminophen, dextrose 50%, glucagon (human recombinant), insulin aspart U-100     Review of Systems    Objective:     Weight: 84.5 kg (186 lb 4.6 oz)  Body mass index is 23.92 kg/m².  Vital Signs (Most Recent):  Temp: 98.5 °F (36.9 °C) (01/31/22 0700)  Pulse: 93 (01/31/22 0800)  Resp: 17 (01/31/22 0800)  BP: (!) 104/0 (01/31/22 0500)  SpO2: 99 % (01/31/22 0843) Vital Signs (24h Range):  Temp:  [97.6 °F (36.4 °C)-98.5 °F (36.9 °C)] 98.5 °F (36.9 °C)  Pulse:  [] 93  Resp:  [11-35] 17  SpO2:  [95 %-100 %] 99 %  BP: ()/(0-86) 104/0  Arterial Line BP: ()/() 117/88     Date 01/31/22 0700 - 02/01/22 0659   Shift 7277-5563 0928-8295 4284-6788 24 Hour Total   INTAKE   P.O. 240   240   I.V.(mL/kg) 90.6(1.1)   90.6(1.1)   Shift Total(mL/kg) 330.6(3.9)   330.6(3.9)   OUTPUT   Urine(mL/kg/hr) 250   250   Shift Total(mL/kg) 250(3)   250(3)   Weight (kg) 84.5 84.5 84.5 84.5                        NG/OG Tube 01/27/22 2140 18 Fr. Center mouth (Active)   $ NG/OG Tube Placement Complete 01/27/22 2130   Placement Check placement verified by x-ray;placement verified by aspirate characteristics 01/27/22 2309   Tolerance no signs/symptoms of discomfort 01/27/22 2309   Securement secured to commercial device 01/27/22 2309   Clamp Status/Tolerance clamped 01/27/22 2309   Suction Setting/Drainage Method suction at the bedside 01/27/22 2309   Insertion Site Appearance no redness, warmth, tenderness, skin breakdown, drainage 01/27/22  "2309   Flush/Irrigation flushed w/;water;no resistance met 01/27/22 2309            Urethral Catheter 01/27/22 1858 (Active)   $ Duarte Insertion Complete 01/27/22 1947   Site Assessment Clean;Intact 01/28/22 0300   Collection Container Urimeter 01/28/22 0300   Securement Method secured to top of thigh w/ adhesive device 01/28/22 0300   Catheter Care Performed no 01/28/22 0300   Reason for Continuing Urinary Catheterization Critically ill in ICU and requiring hourly monitoring of intake/output 01/28/22 0300   CAUTI Prevention Bundle StatLock in place w 1" slack;Intact seal between catheter & drainage tubing;Drainage bag/urimeter off the floor;Sheeting clip in use;No dependent loops or kinks;Drainage bag/urimeter not overfilled (<2/3 full);Drainage bag/urimeter below bladder 01/28/22 0300   Output (mL) 55 mL 01/28/22 1000       Physical Exam:    Constitutional: He appears well-nourished. No distress.     Eyes: Pupils are equal, round, and reactive to light. EOM are normal.     Cardiovascular: Normal rate and regular rhythm.     Abdominal: Soft.     Psych/Behavior: He is alert.     [id number]  Alert  Answers questions appropriately by nodding/shaking head  PERRL  EOMI  Face Symmetric  BUE 5/5  BLE 5/5  No drift      Significant Labs:  Recent Labs   Lab 01/29/22  1409 01/29/22  1754 01/30/22  0335 01/30/22  0340 01/30/22  1730 01/31/22  0049 01/31/22  0450   GLU  --   --  107  --   --   --  131*   NA  --    < > 143   < > 138 132* 142   K 4.1  --  4.0  --   --   --  4.1   CL  --   --  109  --   --   --  112*   CO2  --   --  28  --   --   --  22*   BUN  --   --  19  --   --   --  15   CREATININE  --   --  0.9  --   --   --  0.8   CALCIUM  --   --  8.4*  --   --   --  8.2*   MG 1.9  --  2.0  --   --   --  1.6    < > = values in this interval not displayed.     Recent Labs   Lab 01/30/22 0335 01/31/22 0450   WBC 6.78 6.64   HGB 9.5* 9.2*   HCT 30.9* 31.0*    115*     Recent Labs   Lab 01/30/22 0335 01/31/22  0450 "   INR 1.5* 1.5*   APTT 36.0* 30.4     Microbiology Results (last 7 days)     ** No results found for the last 168 hours. **        All pertinent labs from the last 24 hours have been reviewed.    Significant Diagnostics:  I have reviewed and interpreted all pertinent imaging results/findings within the past 24 hours.

## 2022-01-31 NOTE — PT/OT/SLP PROGRESS
Speech Language Pathology Treatment  Discharge    Patient Name:  Rocco France   MRN:  [id number]   [id number]    Admitting Diagnosis: Bilateral subdural hematomas    Recommendations:                 General Recommendations:  Follow-up not indicated  Diet recommendations:  Regular, Liquid Diet Level: Thin   Aspiration Precautions: HOB to 90 degrees, Small bites/sips and Standard aspiration precautions   General Precautions: Standard, fall,LVAD  Communication strategies:  none    Subjective     Patient awake and cooperative.   Communicated with RN prior to entry    Pain/Comfort:  ·  no pain    Respiratory Status: room air    Objective:     Has the patient been evaluated by SLP for swallowing?   Yes  Keep patient NPO? No   Current Respiratory Status:   room air    Patient seen to assess tolerance of diet. He was assessed with self fed bites of sausage, grits, and eggs and sips of juice via straw. He presents with timely oral clearance and no overt s/s of aspiration. Patient and RN report no noted difficulties with meals. SLP provided education on SLP recommendations, SLP role, s/s and risks of aspiration, safe swallow precautions, and d/c from ST services. Patient verbalized understanding of all discussed and is in agreement with POC. Communicated with RN.     Assessment:     Rocco France is a 66 y.o. male with adequate tolerance of regular solids and thin liquids. No further skilled acute Speech Therapy services warranted at this time. Please re-consult as needed.     Goals:   Multidisciplinary Problems     SLP Goals     Not on file          Multidisciplinary Problems (Resolved)        Problem: SLP Goal    Goal Priority Disciplines Outcome   SLP Goal   (Resolved)     SLP Met   Description: Goals expected to be met by 2/5:  1. Pt will tolerate least restrictive diet without overt s/sx airway threat.                          Plan:     · Plan of Care reviewed with:  patient   · SLP Follow-Up:  No        Discharge recommendations:   (no ST needs)   Barriers to Discharge:  None    Time Tracking:     SLP Treatment Date:   01/31/22  Speech Start Time:  0816  Speech Stop Time:  0832     Speech Total Time (min):  16 min    Billable Minutes: Treatment Swallowing Dysfunction 8 and Self Care/Home Management Training 8    01/31/2022

## 2022-01-31 NOTE — ASSESSMENT & PLAN NOTE
-Levophed off. MAP goal >65  -Reinitiate home hypertensives with lisinopril at lower dose than PTA. Will start 5mg BID and uptitrate as needed. PTA dosing was 10mg qam and 20mg qhs.

## 2022-01-31 NOTE — PT/OT/SLP PROGRESS
Occupational Therapy Treatment    Patient Name:  Rocco France   MRN:  [id number]  Admit Date: 1/27/2022  Admitting Diagnosis:  Bilateral subdural hematomas   Length of Stay: 4 days  Recent Surgery: * No surgery found *      Recommendations:     Discharge Recommendations: rehabilitation facility  Discharge Equipment Recommendations:  other (see comments) (TBD (progress pending))  Barriers to discharge:  Inaccessible home environment,Decreased caregiver support    Plan:     Patient to be seen 4 x/week to address the above listed problems via therapeutic activities,therapeutic exercises,self-care/home management  · Plan of Care Expires: 02/28/22  · Plan of Care Reviewed with: patient    Assessment:   Rocco France is a 66 y.o. male with a medical diagnosis of Bilateral subdural hematomas.  He presents with the following performance deficits affecting function: weakness,impaired endurance,impaired self care skills,impaired functional mobilty,gait instability,impaired balance,pain,decreased safety awareness,decreased lower extremity function,decreased upper extremity function,impaired coordination,impaired cardiopulmonary response to activity.      Pt tolerated session fairly this date and was willing to participate. Demonstrating continued increased pain, decreased activity tolerance, impaired endurance, increased fatigue, increased weakness, impaired balance and decreased safety awareness, requiring increased time and assistance to complete functional tasks. Patient completed bed mobility with Min A this date while HOB elevated. Patient tolerated EOB sitting for ~5 minutes with SBA and forward flexed posture, reports increased pain/headache, unable to tolerate or progress further at this time. Nurse notified. Patient is progressing towards established goals, and continues to benefit from acute skilled OT services to increase functional performance and improve quality of life. OT to continue to recommend rehab at  "discharge to improve pt functional independence and increase patient safety before returning home.      Rehab Prognosis: Fair; patient would benefit from acute skilled OT services to address these deficits and reach maximum level of function.        Subjective   Communicated with: Nurse prior to session.  Patient found HOB elevated with arterial line,blood pressure cuff,LVAD,telemetry,pulse ox (continuous),peripheral IV upon OT entry to room. Pt agreeable to participate at this time.    Patient: " I know if I would have tried to stand with the pain like that I was going to get dizzy and pass out "    Pain/Comfort:  Pain Rating 1: 4/10  Location - Orientation 1: generalized  Location 1: head ("migraine")  Pain Addressed 1: Distraction,Cessation of Activity,Nurse notified  Pain Rating Post-Intervention 1: 6/10    Objective:   Patient found with: arterial line,blood pressure cuff,LVAD,telemetry,pulse ox (continuous),peripheral IV   General Precautions: Standard, Cardiac fall,LVAD,airborne,contact,droplet   Orthopedic Precautions:N/A   Braces: N/A   Oxygen Device: Room Air  Vitals: BP (!) 104/0 (BP Location: Left arm, Patient Position: Lying)   Pulse 75   Temp 98.7 °F (37.1 °C) (Oral)   Resp 17   Ht 6' 2" (1.88 m)   Wt 84.5 kg (186 lb 4.6 oz)   SpO2 100%   BMI 23.92 kg/m²     Outcome Measures:  [facility] 6 Click ADL: 17    Cognition:   · Alert  · Command following: follows one-step commands  · Communication: clear/fluent    Occupational Performance:  Bed Mobility:    · Patient completed Rolling/Turning to Right with minimum assistance  · Patient completed Supine to Sit with minimum assistance on R side of bed  · Scooting anteriorly to EOB to have both feet planted on floor: contact guard assistance  · Patient completed Sit to Supine with minimum assistance on R side of bed  · Scooting laterally along EOB: contact guard assistance    Functional Mobility/Transfers:   Static Sitting EOB: SBA, FF posture   Unable to " progress this date due to increased pain       Activities of Daily Living:  · Declined needs at this time    AMPA 6 Click ADL:  [facility] Total Score: 17    Treatment & Education:  -Pt education on OT role and POC.  -Importance of E/OOB activity with staff assistance, emphasis on daily participation  -Patient tolerated EOB sitting for ~ 5 minutes with SBA  -Safety during functional transfer and mobility ensured  -Patient provided with education on importance of Bilateral UB/LB integration during functional tasks for improvement in functional performance.   -Education provided/reviewed, questions answered within OT scope of practice.   -Patient demonstrates understanding and learning this date.         Patient left HOB elevated with all lines intact, call button in reach and nurse notified    GOALS:   Multidisciplinary Problems     Occupational Therapy Goals        Problem: Occupational Therapy Goal    Goal Priority Disciplines Outcome Interventions   Occupational Therapy Goal     OT, PT/OT Ongoing, Progressing    Description: Goals set on 1/29 with expiration date 2/12:  Patient will increase functional independence with ADLs by performing:    Supine <> Sit with Weare.  Grooming while standing at sink with Mod Weare using DME as needed.   UB Dressing with Weare.  LB Dressing with Weare.  Step transfer with Mod Weare with DME as needed.  Pt will demonstrate understanding of education provided regarding energy conservation and task modification through teach-back method.                      Time Tracking:     OT Date of Treatment: 01/31/22  OT Start Time: 1430  OT Stop Time: 1449  OT Total Time (min): 19 min    Billable Minutes:Therapeutic Activity 19 1/31/2022

## 2022-01-31 NOTE — ASSESSMENT & PLAN NOTE
-Patient had a traumatic subdural, subarachnoid hemorrhage after fall on 1/27. Currently no focal deficits  -NCC and NSGY following. Plans for serial imaging pending  -INR 1.6 on admit, 1.5 this morning. Coumadin/ASA on hold  -Patient had elevated maps above 90 on arrival, but overnight required addition of low dose Levophed to keep MAP > 65. Off of levo since 1/29, reintroducing lisinopril at lower dose today for MAPs in 90s  -Goal NA+ > 135, 142 this morning. Awaiting NCC input regarding continuation of hypertonic saline.  -Keppra started as AED. EEG negative for seizures  -Transcranial dopplers today per NSGY  -Continue q1h neurochecks

## 2022-01-31 NOTE — ASSESSMENT & PLAN NOTE
-S/P DT HM3 1/13/21  -Current speed 5000  -INR goal 2.0-3.0. Current INR 1.5. Coumadin and ASA remain on hold  -LDH stable    Procedure: Device Interrogation Including analysis of device parameters  Current Settings: Ventricular Assist Device  Review of device function is stable/unstable stable         TXP LVAD INTERROGATIONS 1/31/2022 1/31/2022 1/31/2022 1/31/2022 1/31/2022 1/31/2022 1/31/2022   Type - - - - - - HeartMate3   Flow 3.5 3.2 2.8 3.2 3.3 3.3 3.0   Speed 5000 5000 5000 5000 5000 5000 5000   PI 8.8 10.2 11.8 9.5 9.8 10.5 11.4   Power (Edwards) 3.4 3.5 3.3 3.4 3.5 3.4 3.5   LSL 4600 4600 4600 4600 4600 4600 4600   Pulsatility Pulse Pulse Pulse Pulse Pulse Intermittent pulse Intermittent pulse

## 2022-01-31 NOTE — PROGRESS NOTES
Visited patient at the bedside. Patient asleep. Spoke with patient's RN and she stated patient is doing well but getting more depressed each day that he is here. Will continue to visit patient at bedside.

## 2022-01-31 NOTE — PROGRESS NOTES
[facility] - Cardiac Intensive Care  Heart Transplant  Progress Note    Patient Name: Rocco France  MRN: [id number]  Admission Date: 1/27/2022  Hospital Length of Stay: 4 days  Attending Physician: Deana Lake MD  Primary Care Provider: Teresa Mendoza NP  Principal Problem:Bilateral subdural hematomas    Subjective:     Interval History: No events overnight. Still c/o of HA, rates as 3/10, stable from previous days and improves with tylenol. Tolerating diet. NS and NCC following, Na 142 today on hypertonic saline. Awaiting NCC recs regarding discontinuation. NS ordered transcranial dopplers to evaluate for vasospasms. He is hypertensive with MAPs in 90s per arterial line and high PI on LVAD, will start low dose lisinopril today and uptitrate as needed. Previously taking lisinopril 10mg qam and 20mg in evening. Off of levophed since 1/29.  INR 1.5 today.    Continuous Infusions:   sodium chloride 0.9% 5 mL/hr at 01/31/22 1200    NORepinephrine bitartrate-D5W Stopped (01/29/22 1048)     Scheduled Meds:   atorvastatin  80 mg Oral Daily    chlorhexidine  15 mL Mouth/Throat BID    famotidine  20 mg Oral BID    levETIRAcetam  500 mg Oral BID    lisinopriL  5 mg Oral BID     PRN Meds:sodium chloride, acetaminophen, dextrose 50%, glucagon (human recombinant), insulin aspart U-100    Review of patient's allergies indicates:   Allergen Reactions    Pcn [penicillins] Hives     Objective:     Vital Signs (Most Recent):  Temp: 97.8 °F (36.6 °C) (01/31/22 1100)  Pulse: 88 (01/31/22 1300)  Resp: 18 (01/31/22 1300)  BP: (!) 110/0 (01/31/22 1100)  SpO2: 97 % (01/31/22 1300) Vital Signs (24h Range):  Temp:  [97.6 °F (36.4 °C)-98.5 °F (36.9 °C)] 97.8 °F (36.6 °C)  Pulse:  [] 88  Resp:  [11-35] 18  SpO2:  [97 %-100 %] 97 %  BP: ()/(0-89) 110/0  Arterial Line BP: ()/() 102/77     Patient Vitals for the past 72 hrs (Last 3 readings):   Weight   01/30/22 0600 84.5 kg (186 lb 4.6 oz)     Body mass index  is 23.92 kg/m².      Intake/Output Summary (Last 24 hours) at 1/31/2022 1352  Last data filed at 1/31/2022 1200  Gross per 24 hour   Intake 1800.41 ml   Output 1500 ml   Net 300.41 ml       Hemodynamic Parameters:        Telemetry: NSR     Physical Exam  Constitutional:       Appearance: Normal appearance.   HENT:      Head: Normocephalic.      Nose: Nose normal.   Cardiovascular:      Rate and Rhythm: Normal rate and regular rhythm.      Pulses: Normal pulses.      Heart sounds: Normal heart sounds.      Comments: VAD hum present  Pulmonary:      Effort: Pulmonary effort is normal.      Breath sounds: Normal breath sounds.   Abdominal:      General: Abdomen is flat. Bowel sounds are normal.      Palpations: Abdomen is soft.   Musculoskeletal:         General: Normal range of motion.      Cervical back: Normal range of motion.   Skin:     General: Skin is warm.      Capillary Refill: Capillary refill takes less than 2 seconds.   Neurological:      General: No focal deficit present.      Mental Status: He is alert and oriented to person, place, and time.         Significant Labs:  CBC:  Recent Labs   Lab 01/29/22 0442 01/30/22 0335 01/31/22  0450   WBC 7.13 6.78 6.64   RBC 3.31* 3.33* 3.22*   HGB 9.5* 9.5* 9.2*   HCT 30.7* 30.9* 31.0*    161 115*   MCV 93 93 96   MCH 28.7 28.5 28.6   MCHC 30.9* 30.7* 29.7*     BNP:  Recent Labs   Lab 01/27/22 2055 01/31/22  0450   * 586*     CMP:  Recent Labs   Lab 01/27/22 2055 01/28/22  0816 01/29/22  0442 01/29/22  0616 01/29/22  1409 01/29/22  1754 01/30/22  0335 01/30/22  0340 01/31/22  0049 01/31/22  0450 01/31/22  1118   *   < > 86  --   --   --  107  --   --  131*  --    CALCIUM 9.0   < > 8.7  --   --   --  8.4*  --   --  8.2*  --    ALBUMIN 2.6*  --   --   --   --   --   --   --   --  2.2*  --    PROT 8.7*  --   --   --   --   --   --   --   --  7.1  --    *   < > 139   < >  --    < > 143   < > 132* 142 139   K 4.6   < > 3.8   < > 4.1  --  4.0   --   --  4.1  --    CO2 22*   < > 26  --   --   --  28  --   --  22*  --       < > 106  --   --   --  109  --   --  112*  --    BUN 33*   < > 24*  --   --   --  19  --   --  15  --    CREATININE 1.3   < > 1.0  --   --   --  0.9  --   --  0.8  --    ALKPHOS 120  --   --   --   --   --   --   --   --  102  --    ALT 16  --   --   --   --   --   --   --   --  11  --    AST 38  --   --   --   --   --   --   --   --  24  --    BILITOT 1.6*  --   --   --   --   --   --   --   --  0.9  --     < > = values in this interval not displayed.      Coagulation:   Recent Labs   Lab 01/29/22 0442 01/30/22  0335 01/31/22  0450   INR 1.4* 1.5* 1.5*   APTT 32.4* 36.0* 30.4     LDH:  Recent Labs   Lab 01/29/22 0442 01/30/22  0335 01/31/22  0450    208 390*     Microbiology:  Microbiology Results (last 7 days)     ** No results found for the last 168 hours. **          I have reviewed all pertinent labs within the past 24 hours.    Estimated Creatinine Clearance: 105.6 mL/min (based on SCr of 0.8 mg/dL).    Diagnostic Results:  I have reviewed and interpreted all pertinent imaging results/findings within the past 24 hours.    Assessment and Plan:     65 yo BM with stage D CHF due to NICMP s/p HM3, s/p ICD, HTN, HLD, T2DM was transferred from outside hospital emergency room after he was found to have subdural hematoma/subarachnoid hemorrhage.  Patient presented to the ER after he was found to have a syncopal event.  Also patient had respiratory distress on arrival for which he was intubated.  Patient was transferred here for further evaluation.  On arrival patient is intubated and is on propofol.  Neurosurgery and Neuro Critical Care were immediately informed.  Neurosurgery recommended to repeat CT after reviewing the prior CT done in the ER in [place].  INR on arrival is 1.6.  He also had a low flow with a increased PI around 4:00 p.m. this evening.  According to wife patient was complaining of headache for the last 2  weeks..  [place] went to post office this [place] and she does not know what exactly happened..  ICD was interrogated and did not show any VT/VF.       * Bilateral subdural hematomas  -Patient had a traumatic subdural, subarachnoid hemorrhage after fall on 1/27. Currently no focal deficits  -NCC and NSGY following. Plans for serial imaging pending  -INR 1.6 on admit, 1.5 this morning. Coumadin/ASA on hold  -Patient had elevated maps above 90 on arrival, but overnight required addition of low dose Levophed to keep MAP > 65. Off of levo since 1/29, reintroducing lisinopril at lower dose today for MAPs in 90s  -Goal NA+ > 135, 142 this morning. Awaiting NCC input regarding continuation of hypertonic saline.  -Keppra started as AED. EEG negative for seizures  -Transcranial dopplers today per NSGY  -Continue q1h neurochecks    Acute respiratory failure requiring reintubation  -Intubated prior to transfer for respiratory distress  -Patient is currently on contact isolation for reported +ve Covid test at OSH prior to transfer. CXR unremarkable. Repeat COVID test positive  - Extubated 1/28 and on RA  - Tx'd with remdesivir which was since discontinued. Asymptomatic from covid perspective    LVAD (left ventricular assist device) present  -S/P DT HM3 1/13/21  -Current speed 5000  -INR goal 2.0-3.0. Current INR 1.5. Coumadin and ASA remain on hold  -LDH stable    Procedure: Device Interrogation Including analysis of device parameters  Current Settings: Ventricular Assist Device  Review of device function is stable/unstable stable         TXP LVAD INTERROGATIONS 1/31/2022 1/31/2022 1/31/2022 1/31/2022 1/31/2022 1/31/2022 1/31/2022   Type - - - - - - HeartMate3   Flow 3.5 3.2 2.8 3.2 3.3 3.3 3.0   Speed 5000 5000 5000 5000 5000 5000 5000   PI 8.8 10.2 11.8 9.5 9.8 10.5 11.4   Power (Edwards) 3.4 3.5 3.3 3.4 3.5 3.4 3.5   LSL 4600 4600 4600 4600 4600 4600 4600   Pulsatility Pulse Pulse Pulse Pulse Pulse Intermittent pulse Intermittent  pulse       Type 2 diabetes mellitus without complication  -SSI  ACHS     Essential hypertension  -Levophed off. MAP goal >65  -Reinitiate home hypertensives with lisinopril at lower dose than PTA. Will start 5mg BID and uptitrate as needed. PTA dosing was 10mg qam and 20mg qhs.  Uninterrupted Critical Care/Counseling Time (not including procedures): 60 minutes      Valentina Jose PA-C  Heart Transplant  [facility] - Cardiac [facility]

## 2022-01-31 NOTE — PLAN OF CARE
Problem: SLP Goal  Goal: SLP Goal  Description: Goals expected to be met by 2/5:  1. Pt will tolerate least restrictive diet without overt s/sx airway threat.     Outcome: Met  Patient tolerating regular diet and thin liquids with no overt s/s of aspiration. No further skilled acute Speech Therapy services warranted at this time. Please re-consult as needed.

## 2022-01-31 NOTE — PROGRESS NOTES
01/31/2022  Alex Hutchins    Current provider:  Deana Lake MD    Device interrogation:  TXP LVAD INTERROGATIONS 1/31/2022 1/31/2022 1/31/2022 1/31/2022 1/31/2022 1/31/2022 1/31/2022   Type HeartMate3 - - - - - -   Flow 3.0 3.2 3 3 3.2 3.4 3.2   Speed 5000 5000 5000 5000 5000 5000 5000   PI 11.4 10 10.9 8.7 9.5 10.7 9.9   Power (Edwards) 3.5 3.1 3.2 3.2 3.2 3.3 3.3   LSL 4600 4600 4600 4600 4600 4600 4600   Pulsatility Intermittent pulse Intermittent pulse Intermittent pulse Intermittent pulse Intermittent pulse Intermittent pulse Intermittent pulse          Rounded on Rocco France to ensure all mechanical assist device settings (IABP or VAD) were appropriate and all parameters were within limits.  I was able to ensure all back up equipment was present, the staff had no issues, and the Perfusion Department daily rounding was complete.      For implantable VADs: Interrogation of Ventricular assist device was performed with analysis of device parameters and review of device function. I have personally reviewed the interrogation findings and agree with findings as stated.     In emergency, the nursing units have been notified to contact the perfusion department either by:  Calling [phone] from 630am to 4pm Mon thru Fri, utilizing the On-Call Finder functionality of Epic and searching for Perfusion, or by contacting the hospital  from 4pm to 630am and on weekends and asking to speak with the perfusionist on call.    8:19 AM

## 2022-01-31 NOTE — NURSING
"Pt. Stood up OOB with OT.  C/o of headache 6/10, states like a "migraine" on front and top of head.  Increased from intermittent 2/10 headache just prior.  CT Yap made aware.  Pending call back from NSGY team.  "

## 2022-01-31 NOTE — PLAN OF CARE
Frequent neuro checks performed.Patient AOx4 throughout shift no neuro changes. Remains on room air.   Patient's BP MAP 80's-100. Most of shift MAP 80's to 94 according to Yolette, cuff pressures, and Doppler.  MD Neo notified. >100 MAP was not sustained. MD Neo ordered no interventions but continue to monitor  Pulse index : 7.2-11.3 Team notified.  Patient mostly pulsatile throughout shift.  Systolics low 100's.  1 BM/shift  U/O 800 cc/shift       MD Neo notified.   Mag replaced.  Phos replaced      Na acetate 3% 30 cc/hr continued  KVO

## 2022-01-31 NOTE — NURSING
Patient MAP sustaining mid 90s.  Scheduled lisinopril administered at 1100.  Vitals stable, no alarms on LVAD.  CT Yap made aware; awaiting possible orders at this time.  Will continue to monitor.

## 2022-01-31 NOTE — PROGRESS NOTES
01/30/2022  Vanessa Ramires    Current provider:  Deana Lake MD    Device interrogation:  TXP LVAD INTERROGATIONS 1/30/2022 1/30/2022 1/30/2022 1/30/2022 1/30/2022 1/30/2022 1/30/2022   Type - - - - - - -   Flow 3.3 3.4 3.5 3.5 3.7 3.6 3.5   Speed 5000 5000 500 5000 5000 5000 5000   PI 10 8.7 8 8.5 7.2 8.2 7.   Power (Edwards) 3.4 3.5 3.4 3.4 3.4 3.3 7.8   LSL 4600 4600 4600 4600 4600 4600 4600   Pulsatility Intermittent pulse Intermittent pulse Intermittent pulse Intermittent pulse Intermittent pulse Intermittent pulse Intermittent pulse          Rounded on Rocco France to ensure all mechanical assist device settings (IABP or VAD) were appropriate and all parameters were within limits.  I was able to ensure all back up equipment was present, the staff had no issues, and the Perfusion Department daily rounding was complete.      For implantable VADs: Interrogation of Ventricular assist device was performed with analysis of device parameters and review of device function. I have personally reviewed the interrogation findings and agree with findings as stated.     In emergency, the nursing units have been notified to contact the perfusion department either by:  Calling [phone] from 630am to 4pm Mon thru Fri, utilizing the On-Call Finder functionality of Epic and searching for Perfusion, or by contacting the hospital  from 4pm to 630am and on weekends and asking to speak with the perfusionist on call.    7:41 PM

## 2022-01-31 NOTE — PROGRESS NOTES
66 y.o man with perimesencephalic subarachnoid hemorrhage  Extubated since last seen  Awake and taking diet  Sodium has gradually increased to 142 on 3% gtt  Recommend to d/c 3% gtt, cont to monitor serial sodiums under more aggressive fluid restriction, sodium goal 135-145  Risk of vasospasm very small with this particular disease  TCDs will not be helpful since all published values and cutoffs for spasm watch are in patients without LVADs  Would recommend to continue q1 hr neuro checks and ICU monitoring to at least day 8-10 with a spot CTA head performed  At day 7 post ictus  Timing of resumption of anticoagulation for LVAD per Neurosurgery

## 2022-01-31 NOTE — ASSESSMENT & PLAN NOTE
66M PMHx cardiomyopathy, s/p LVAD on coumadin and CAD s/p stenting on ASA, presenting after syncopal event and fall with hitting head, found to have bilateral SDH R>L on CT at OSH, rpt CT with new cisternal SAH, negative aneurysm or vascular malformation on CTA, likely nonaneurysmal perimesencephalic SAH        --recommend transcranial dopplers today to monitor for vasospasm  -- CICU primary  -- q1 hour vitals/neurochecks  -- SBP < 140  -- Na > 135  -- Keppra 500 BID  -- INR 1.6 >> 1.3   -- Hold ASA/coumadin: no need for reversal at this time  -- All imaging reviewed   -- CTH 1/27 OSH: bilat aucte on chronic SDH, R > L, no shift, no hydro, cisterns open.    -- CTH 1/27 rpt: new SAH in interpeduncular and quadrigeminal cisterns   -- CTA 1/27: negative for aneurysm or vascular malformation   -- No recommendation for angiogram at this time, SAH likely related to venous plexus bleed  -- 3T on arrival, progressed to awake, alert and full strength, concern for seizures or postictal state at presentation   -- workup per NCC   -- AEDs per NCC

## 2022-01-31 NOTE — SUBJECTIVE & OBJECTIVE
Interval History: No events overnight. Still c/o of HA, rates as 3/10, stable from previous days and improves with tylenol. Tolerating diet. NS and NCC following, Na 142 today on hypertonic saline. Awaiting NCC recs regarding discontinuation. NS ordered transcranial dopplers to evaluate for vasospasms. He is hypertensive with MAPs in 90s per arterial line and high PI on LVAD, will start low dose lisinopril today and uptitrate as needed. Previously taking lisinopril 10mg qam and 20mg in evening. Off of levophed since 1/29.  INR 1.5 today.    Continuous Infusions:   sodium chloride 0.9% 5 mL/hr at 01/31/22 1200    NORepinephrine bitartrate-D5W Stopped (01/29/22 1048)     Scheduled Meds:   atorvastatin  80 mg Oral Daily    chlorhexidine  15 mL Mouth/Throat BID    famotidine  20 mg Oral BID    levETIRAcetam  500 mg Oral BID    lisinopriL  5 mg Oral BID     PRN Meds:sodium chloride, acetaminophen, dextrose 50%, glucagon (human recombinant), insulin aspart U-100    Review of patient's allergies indicates:   Allergen Reactions    Pcn [penicillins] Hives     Objective:     Vital Signs (Most Recent):  Temp: 97.8 °F (36.6 °C) (01/31/22 1100)  Pulse: 88 (01/31/22 1300)  Resp: 18 (01/31/22 1300)  BP: (!) 110/0 (01/31/22 1100)  SpO2: 97 % (01/31/22 1300) Vital Signs (24h Range):  Temp:  [97.6 °F (36.4 °C)-98.5 °F (36.9 °C)] 97.8 °F (36.6 °C)  Pulse:  [] 88  Resp:  [11-35] 18  SpO2:  [97 %-100 %] 97 %  BP: ()/(0-89) 110/0  Arterial Line BP: ()/() 102/77     Patient Vitals for the past 72 hrs (Last 3 readings):   Weight   01/30/22 0600 84.5 kg (186 lb 4.6 oz)     Body mass index is 23.92 kg/m².      Intake/Output Summary (Last 24 hours) at 1/31/2022 1352  Last data filed at 1/31/2022 1200  Gross per 24 hour   Intake 1800.41 ml   Output 1500 ml   Net 300.41 ml       Hemodynamic Parameters:        Telemetry: NSR     Physical Exam  Constitutional:       Appearance: Normal appearance.   HENT:      Head:  Normocephalic.      Nose: Nose normal.   Cardiovascular:      Rate and Rhythm: Normal rate and regular rhythm.      Pulses: Normal pulses.      Heart sounds: Normal heart sounds.      Comments: VAD hum present  Pulmonary:      Effort: Pulmonary effort is normal.      Breath sounds: Normal breath sounds.   Abdominal:      General: Abdomen is flat. Bowel sounds are normal.      Palpations: Abdomen is soft.   Musculoskeletal:         General: Normal range of motion.      Cervical back: Normal range of motion.   Skin:     General: Skin is warm.      Capillary Refill: Capillary refill takes less than 2 seconds.   Neurological:      General: No focal deficit present.      Mental Status: He is alert and oriented to person, place, and time.         Significant Labs:  CBC:  Recent Labs   Lab 01/29/22 0442 01/30/22  0335 01/31/22  0450   WBC 7.13 6.78 6.64   RBC 3.31* 3.33* 3.22*   HGB 9.5* 9.5* 9.2*   HCT 30.7* 30.9* 31.0*    161 115*   MCV 93 93 96   MCH 28.7 28.5 28.6   MCHC 30.9* 30.7* 29.7*     BNP:  Recent Labs   Lab 01/27/22 2055 01/31/22  0450   * 586*     CMP:  Recent Labs   Lab 01/27/22 2055 01/28/22  0816 01/29/22  0442 01/29/22  0616 01/29/22  1409 01/29/22  1754 01/30/22  0335 01/30/22  0340 01/31/22  0049 01/31/22  0450 01/31/22  1118   *   < > 86  --   --   --  107  --   --  131*  --    CALCIUM 9.0   < > 8.7  --   --   --  8.4*  --   --  8.2*  --    ALBUMIN 2.6*  --   --   --   --   --   --   --   --  2.2*  --    PROT 8.7*  --   --   --   --   --   --   --   --  7.1  --    *   < > 139   < >  --    < > 143   < > 132* 142 139   K 4.6   < > 3.8   < > 4.1  --  4.0  --   --  4.1  --    CO2 22*   < > 26  --   --   --  28  --   --  22*  --       < > 106  --   --   --  109  --   --  112*  --    BUN 33*   < > 24*  --   --   --  19  --   --  15  --    CREATININE 1.3   < > 1.0  --   --   --  0.9  --   --  0.8  --    ALKPHOS 120  --   --   --   --   --   --   --   --  102  --    ALT 16   --   --   --   --   --   --   --   --  11  --    AST 38  --   --   --   --   --   --   --   --  24  --    BILITOT 1.6*  --   --   --   --   --   --   --   --  0.9  --     < > = values in this interval not displayed.      Coagulation:   Recent Labs   Lab 01/29/22 0442 01/30/22 0335 01/31/22 0450   INR 1.4* 1.5* 1.5*   APTT 32.4* 36.0* 30.4     LDH:  Recent Labs   Lab 01/29/22 0442 01/30/22 0335 01/31/22 0450    208 390*     Microbiology:  Microbiology Results (last 7 days)     ** No results found for the last 168 hours. **          I have reviewed all pertinent labs within the past 24 hours.    Estimated Creatinine Clearance: 105.6 mL/min (based on SCr of 0.8 mg/dL).    Diagnostic Results:  I have reviewed and interpreted all pertinent imaging results/findings within the past 24 hours.

## 2022-01-31 NOTE — ASSESSMENT & PLAN NOTE
-Intubated prior to transfer for respiratory distress  -Patient is currently on contact isolation for reported +ve Covid test at OSH prior to transfer. CXR unremarkable. Repeat COVID test positive  - Extubated 1/28 and on RA  - Tx'd with remdesivir which was since discontinued. Asymptomatic from covid perspective

## 2022-01-31 NOTE — PROGRESS NOTES
[facility]  Neurosurgery  Progress Note    Subjective:     History of Present Illness: 66M PMHx cardiomyopathy, s/p LVAD on coumadin and CAD s/p stenting on ASA, presenting after fall, consulted to NSGY for SDH. Pt had syncopal event earlier today with prodrome of dizziness, fell, hit head, went to OSH for workup and was complaining of occipital H/A. At OSH, pt had acute neurologic decline, had to be intubated, and presents to [facility] intubated, sedated and with no motor activity. However, 2 hours after presentation to [facility], pt's neurologic status has improved to awake, alert, and following commands briskly with minor headache.       Post-Op Info:  * No surgery found *         Interval History: No acute events.    Medications:  Continuous Infusions:   sodium chloride 0.9% Stopped (01/31/22 0651)    NORepinephrine bitartrate-D5W Stopped (01/29/22 1048)    buffered 3% sodium acetate 130meq, sodium chloride 130meq, sterile water for inj IV soln 30 mL/hr at 01/31/22 0800     Scheduled Meds:   atorvastatin  80 mg Oral Daily    chlorhexidine  15 mL Mouth/Throat BID    famotidine  20 mg Oral BID    levETIRAcetam  500 mg Oral BID     PRN Meds:sodium chloride, acetaminophen, dextrose 50%, glucagon (human recombinant), insulin aspart U-100     Review of Systems    Objective:     Weight: 84.5 kg (186 lb 4.6 oz)  Body mass index is 23.92 kg/m².  Vital Signs (Most Recent):  Temp: 98.5 °F (36.9 °C) (01/31/22 0700)  Pulse: 93 (01/31/22 0800)  Resp: 17 (01/31/22 0800)  BP: (!) 104/0 (01/31/22 0500)  SpO2: 99 % (01/31/22 0843) Vital Signs (24h Range):  Temp:  [97.6 °F (36.4 °C)-98.5 °F (36.9 °C)] 98.5 °F (36.9 °C)  Pulse:  [] 93  Resp:  [11-35] 17  SpO2:  [95 %-100 %] 99 %  BP: ()/(0-86) 104/0  Arterial Line BP: ()/() 117/88     Date 01/31/22 0700 - 02/01/22 0659   Shift 0124-5772 9797-9953 1047-4495 24 Hour Total   INTAKE   P.O. 240   240   I.V.(mL/kg) 90.6(1.1)   90.6(1.1)  "  Shift Total(mL/kg) 330.6(3.9)   330.6(3.9)   OUTPUT   Urine(mL/kg/hr) 250   250   Shift Total(mL/kg) 250(3)   250(3)   Weight (kg) 84.5 84.5 84.5 84.5                        NG/OG Tube 01/27/22 2140 18 Fr. Center mouth (Active)   $ NG/OG Tube Placement Complete 01/27/22 2130   Placement Check placement verified by x-ray;placement verified by aspirate characteristics 01/27/22 2309   Tolerance no signs/symptoms of discomfort 01/27/22 2309   Securement secured to commercial device 01/27/22 2309   Clamp Status/Tolerance clamped 01/27/22 2309   Suction Setting/Drainage Method suction at the bedside 01/27/22 2309   Insertion Site Appearance no redness, warmth, tenderness, skin breakdown, drainage 01/27/22 2309   Flush/Irrigation flushed w/;water;no resistance met 01/27/22 2309            Urethral Catheter 01/27/22 1858 (Active)   $ Duarte Insertion Complete 01/27/22 1947   Site Assessment Clean;Intact 01/28/22 0300   Collection Container Urimeter 01/28/22 0300   Securement Method secured to top of thigh w/ adhesive device 01/28/22 0300   Catheter Care Performed no 01/28/22 0300   Reason for Continuing Urinary Catheterization Critically ill in ICU and requiring hourly monitoring of intake/output 01/28/22 0300   CAUTI Prevention Bundle StatLock in place w 1" slack;Intact seal between catheter & drainage tubing;Drainage bag/urimeter off the floor;Sheeting clip in use;No dependent loops or kinks;Drainage bag/urimeter not overfilled (<2/3 full);Drainage bag/urimeter below bladder 01/28/22 0300   Output (mL) 55 mL 01/28/22 1000       Physical Exam:    Constitutional: He appears well-nourished. No distress.     Eyes: Pupils are equal, round, and reactive to light. EOM are normal.     Cardiovascular: Normal rate and regular rhythm.     Abdominal: Soft.     Psych/Behavior: He is alert.     [id number]  Alert  Answers questions appropriately by nodding/shaking head  PERRL  EOMI  Face Symmetric  BUE 5/5  BLE 5/5  No " drift      Significant Labs:  Recent Labs   Lab 01/29/22  1409 01/29/22  1754 01/30/22  0335 01/30/22  0340 01/30/22  1730 01/31/22  0049 01/31/22  0450   GLU  --   --  107  --   --   --  131*   NA  --    < > 143   < > 138 132* 142   K 4.1  --  4.0  --   --   --  4.1   CL  --   --  109  --   --   --  112*   CO2  --   --  28  --   --   --  22*   BUN  --   --  19  --   --   --  15   CREATININE  --   --  0.9  --   --   --  0.8   CALCIUM  --   --  8.4*  --   --   --  8.2*   MG 1.9  --  2.0  --   --   --  1.6    < > = values in this interval not displayed.     Recent Labs   Lab 01/30/22  0335 01/31/22  0450   WBC 6.78 6.64   HGB 9.5* 9.2*   HCT 30.9* 31.0*    115*     Recent Labs   Lab 01/30/22  0335 01/31/22  0450   INR 1.5* 1.5*   APTT 36.0* 30.4     Microbiology Results (last 7 days)     ** No results found for the last 168 hours. **        All pertinent labs from the last 24 hours have been reviewed.    Significant Diagnostics:  I have reviewed and interpreted all pertinent imaging results/findings within the past 24 hours.    Assessment/Plan:     Subarachnoid bleed  66M PMHx cardiomyopathy, s/p LVAD on coumadin and CAD s/p stenting on ASA, presenting after syncopal event and fall with hitting head, found to have bilateral SDH R>L on CT at OSH, rpt CT with new cisternal SAH, negative aneurysm or vascular malformation on CTA, likely nonaneurysmal perimesencephalic SAH        --recommend transcranial dopplers today to monitor for vasospasm  -- CICU primary  -- q1 hour vitals/neurochecks  -- SBP < 140  -- Na > 135  -- Keppra 500 BID  -- INR 1.6 >> 1.3   -- Hold ASA/coumadin: no need for reversal at this time  -- All imaging reviewed   -- CTH 1/27 OSH: bilat aucte on chronic SDH, R > L, no shift, no hydro, cisterns open.    -- CTH 1/27 rpt: new SAH in interpeduncular and quadrigeminal cisterns   -- CTA 1/27: negative for aneurysm or vascular malformation   -- No recommendation for angiogram at this time, SAH  likely related to venous plexus bleed  -- 3T on arrival, progressed to awake, alert and full strength, concern for seizures or postictal state at presentation   -- workup per NCC   -- AEDs per NCC         Kenroy Kyle MD  Neurosurgery  [facility]

## 2022-02-01 LAB
ANION GAP SERPL CALC-SCNC: 7 MMOL/L (ref 8–16)
APTT BLDCRRT: 31.9 SEC (ref 21–32)
BASOPHILS # BLD AUTO: 0.01 K/UL (ref 0–0.2)
BASOPHILS NFR BLD: 0.1 % (ref 0–1.9)
BUN SERPL-MCNC: 10 MG/DL (ref 8–23)
CALCIUM SERPL-MCNC: 8.1 MG/DL (ref 8.7–10.5)
CHLORIDE SERPL-SCNC: 110 MMOL/L (ref 95–110)
CO2 SERPL-SCNC: 19 MMOL/L (ref 23–29)
CREAT SERPL-MCNC: 0.7 MG/DL (ref 0.5–1.4)
DIFFERENTIAL METHOD: ABNORMAL
EOSINOPHIL # BLD AUTO: 0.1 K/UL (ref 0–0.5)
EOSINOPHIL NFR BLD: 0.5 % (ref 0–8)
ERYTHROCYTE [DISTWIDTH] IN BLOOD BY AUTOMATED COUNT: 14.7 % (ref 11.5–14.5)
EST. GFR  (AFRICAN AMERICAN): >60 ML/MIN/1.73 M^2
EST. GFR  (NON AFRICAN AMERICAN): >60 ML/MIN/1.73 M^2
GLUCOSE SERPL-MCNC: 166 MG/DL (ref 70–110)
HCT VFR BLD AUTO: 34.6 % (ref 40–54)
HGB BLD-MCNC: 10.6 G/DL (ref 14–18)
IMM GRANULOCYTES # BLD AUTO: 0.04 K/UL (ref 0–0.04)
IMM GRANULOCYTES NFR BLD AUTO: 0.4 % (ref 0–0.5)
INR PPP: 1.3 (ref 0.8–1.2)
LDH SERPL L TO P-CCNC: 276 U/L (ref 110–260)
LYMPHOCYTES # BLD AUTO: 1.5 K/UL (ref 1–4.8)
LYMPHOCYTES NFR BLD: 15.9 % (ref 18–48)
MAGNESIUM SERPL-MCNC: 1.7 MG/DL (ref 1.6–2.6)
MAGNESIUM SERPL-MCNC: 1.8 MG/DL (ref 1.6–2.6)
MAGNESIUM SERPL-MCNC: 1.9 MG/DL (ref 1.6–2.6)
MAGNESIUM SERPL-MCNC: 2.1 MG/DL (ref 1.6–2.6)
MCH RBC QN AUTO: 28.2 PG (ref 27–31)
MCHC RBC AUTO-ENTMCNC: 30.6 G/DL (ref 32–36)
MCV RBC AUTO: 92 FL (ref 82–98)
MONOCYTES # BLD AUTO: 0.7 K/UL (ref 0.3–1)
MONOCYTES NFR BLD: 7.3 % (ref 4–15)
NEUTROPHILS # BLD AUTO: 7.3 K/UL (ref 1.8–7.7)
NEUTROPHILS NFR BLD: 75.8 % (ref 38–73)
NRBC BLD-RTO: 0 /100 WBC
PHOSPHATE SERPL-MCNC: 1.8 MG/DL (ref 2.7–4.5)
PLATELET # BLD AUTO: 155 K/UL (ref 150–450)
PMV BLD AUTO: 11.2 FL (ref 9.2–12.9)
POCT GLUCOSE: 143 MG/DL (ref 70–110)
POCT GLUCOSE: 144 MG/DL (ref 70–110)
POTASSIUM SERPL-SCNC: 4.2 MMOL/L (ref 3.5–5.1)
PROTHROMBIN TIME: 13.8 SEC (ref 9–12.5)
RBC # BLD AUTO: 3.76 M/UL (ref 4.6–6.2)
SODIUM SERPL-SCNC: 136 MMOL/L (ref 136–145)
SODIUM SERPL-SCNC: 136 MMOL/L (ref 136–145)
SODIUM SERPL-SCNC: 137 MMOL/L (ref 136–145)
SODIUM SERPL-SCNC: 137 MMOL/L (ref 136–145)
WBC # BLD AUTO: 9.6 K/UL (ref 3.9–12.7)

## 2022-02-01 PROCEDURE — 94761 N-INVAS EAR/PLS OXIMETRY MLT: CPT

## 2022-02-01 PROCEDURE — 93750 INTERROGATION VAD IN PERSON: CPT | Mod: ,,, | Performed by: INTERNAL MEDICINE

## 2022-02-01 PROCEDURE — 25000003 PHARM REV CODE 250: Performed by: STUDENT IN AN ORGANIZED HEALTH CARE EDUCATION/TRAINING PROGRAM

## 2022-02-01 PROCEDURE — 25000003 PHARM REV CODE 250: Performed by: HOSPITALIST

## 2022-02-01 PROCEDURE — 27000248 HC VAD-ADDITIONAL DAY

## 2022-02-01 PROCEDURE — 85730 THROMBOPLASTIN TIME PARTIAL: CPT | Performed by: HOSPITALIST

## 2022-02-01 PROCEDURE — 63600175 PHARM REV CODE 636 W HCPCS: Performed by: STUDENT IN AN ORGANIZED HEALTH CARE EDUCATION/TRAINING PROGRAM

## 2022-02-01 PROCEDURE — 27200188 HC TRANSDUCER, ART ADULT/PEDS

## 2022-02-01 PROCEDURE — 80048 BASIC METABOLIC PNL TOTAL CA: CPT | Performed by: HOSPITALIST

## 2022-02-01 PROCEDURE — 83735 ASSAY OF MAGNESIUM: CPT | Performed by: HOSPITALIST

## 2022-02-01 PROCEDURE — 25000003 PHARM REV CODE 250: Performed by: PHYSICIAN ASSISTANT

## 2022-02-01 PROCEDURE — 63600175 PHARM REV CODE 636 W HCPCS: Performed by: PHYSICIAN ASSISTANT

## 2022-02-01 PROCEDURE — 83615 LACTATE (LD) (LDH) ENZYME: CPT | Performed by: HOSPITALIST

## 2022-02-01 PROCEDURE — 84295 ASSAY OF SERUM SODIUM: CPT | Mod: 91 | Performed by: PSYCHIATRY & NEUROLOGY

## 2022-02-01 PROCEDURE — 93750 PR INTERROGATE VENT ASSIST DEV, IN PERSON, W PHYSICIAN ANALYSIS: ICD-10-PCS | Mod: ,,, | Performed by: INTERNAL MEDICINE

## 2022-02-01 PROCEDURE — 84100 ASSAY OF PHOSPHORUS: CPT | Performed by: HOSPITALIST

## 2022-02-01 PROCEDURE — 97535 SELF CARE MNGMENT TRAINING: CPT

## 2022-02-01 PROCEDURE — 85610 PROTHROMBIN TIME: CPT | Performed by: HOSPITALIST

## 2022-02-01 PROCEDURE — 99291 PR CRITICAL CARE, E/M 30-74 MINUTES: ICD-10-PCS | Mod: ,,, | Performed by: PHYSICIAN ASSISTANT

## 2022-02-01 PROCEDURE — 83735 ASSAY OF MAGNESIUM: CPT | Mod: 91 | Performed by: INTERNAL MEDICINE

## 2022-02-01 PROCEDURE — 85025 COMPLETE CBC W/AUTO DIFF WBC: CPT | Performed by: HOSPITALIST

## 2022-02-01 PROCEDURE — 20000000 HC ICU ROOM

## 2022-02-01 PROCEDURE — 97530 THERAPEUTIC ACTIVITIES: CPT

## 2022-02-01 PROCEDURE — 99233 PR SUBSEQUENT HOSPITAL CARE,LEVL III: ICD-10-PCS | Mod: ,,, | Performed by: NEUROLOGICAL SURGERY

## 2022-02-01 PROCEDURE — 99233 SBSQ HOSP IP/OBS HIGH 50: CPT | Mod: ,,, | Performed by: NEUROLOGICAL SURGERY

## 2022-02-01 PROCEDURE — 27000207 HC ISOLATION

## 2022-02-01 PROCEDURE — 99291 CRITICAL CARE FIRST HOUR: CPT | Mod: ,,, | Performed by: PHYSICIAN ASSISTANT

## 2022-02-01 PROCEDURE — 25000003 PHARM REV CODE 250: Performed by: INTERNAL MEDICINE

## 2022-02-01 RX ORDER — HYDRALAZINE HYDROCHLORIDE 25 MG/1
25 TABLET, FILM COATED ORAL ONCE
Status: DISCONTINUED | OUTPATIENT
Start: 2022-02-01 | End: 2022-02-01

## 2022-02-01 RX ORDER — FUROSEMIDE 40 MG/1
40 TABLET ORAL DAILY
Status: DISCONTINUED | OUTPATIENT
Start: 2022-02-01 | End: 2022-02-02

## 2022-02-01 RX ORDER — HYDRALAZINE HYDROCHLORIDE 20 MG/ML
10 INJECTION INTRAMUSCULAR; INTRAVENOUS ONCE
Status: COMPLETED | OUTPATIENT
Start: 2022-02-01 | End: 2022-02-01

## 2022-02-01 RX ORDER — ONDANSETRON 2 MG/ML
4 INJECTION INTRAMUSCULAR; INTRAVENOUS ONCE
Status: COMPLETED | OUTPATIENT
Start: 2022-02-01 | End: 2022-02-01

## 2022-02-01 RX ORDER — OXYCODONE AND ACETAMINOPHEN 5; 325 MG/1; MG/1
1 TABLET ORAL EVERY 6 HOURS PRN
Status: DISCONTINUED | OUTPATIENT
Start: 2022-02-01 | End: 2022-02-17 | Stop reason: HOSPADM

## 2022-02-01 RX ORDER — MAGNESIUM SULFATE HEPTAHYDRATE 40 MG/ML
2 INJECTION, SOLUTION INTRAVENOUS ONCE
Status: COMPLETED | OUTPATIENT
Start: 2022-02-01 | End: 2022-02-01

## 2022-02-01 RX ORDER — LANOLIN ALCOHOL/MO/W.PET/CERES
400 CREAM (GRAM) TOPICAL ONCE
Status: COMPLETED | OUTPATIENT
Start: 2022-02-01 | End: 2022-02-01

## 2022-02-01 RX ORDER — HYDRALAZINE HYDROCHLORIDE 20 MG/ML
20 INJECTION INTRAMUSCULAR; INTRAVENOUS ONCE
Status: COMPLETED | OUTPATIENT
Start: 2022-02-01 | End: 2022-02-01

## 2022-02-01 RX ORDER — HYDRALAZINE HYDROCHLORIDE 25 MG/1
25 TABLET, FILM COATED ORAL ONCE
Status: COMPLETED | OUTPATIENT
Start: 2022-02-01 | End: 2022-02-01

## 2022-02-01 RX ORDER — ACETAMINOPHEN 325 MG/1
650 TABLET ORAL 2 TIMES DAILY PRN
Status: DISCONTINUED | OUTPATIENT
Start: 2022-02-01 | End: 2022-02-17 | Stop reason: HOSPADM

## 2022-02-01 RX ADMIN — OXYCODONE HYDROCHLORIDE AND ACETAMINOPHEN 1 TABLET: 5; 325 TABLET ORAL at 11:02

## 2022-02-01 RX ADMIN — LISINOPRIL 10 MG: 10 TABLET ORAL at 08:02

## 2022-02-01 RX ADMIN — MUPIROCIN: 20 OINTMENT TOPICAL at 08:02

## 2022-02-01 RX ADMIN — HYDRALAZINE HYDROCHLORIDE 10 MG: 20 INJECTION, SOLUTION INTRAMUSCULAR; INTRAVENOUS at 01:02

## 2022-02-01 RX ADMIN — LEVETIRACETAM 500 MG: 500 TABLET, FILM COATED ORAL at 08:02

## 2022-02-01 RX ADMIN — MAGNESIUM SULFATE HEPTAHYDRATE 2 G: 40 INJECTION, SOLUTION INTRAVENOUS at 07:02

## 2022-02-01 RX ADMIN — HYDRALAZINE HYDROCHLORIDE 20 MG: 20 INJECTION INTRAMUSCULAR; INTRAVENOUS at 07:02

## 2022-02-01 RX ADMIN — HYDRALAZINE HYDROCHLORIDE 25 MG: 25 TABLET, FILM COATED ORAL at 11:02

## 2022-02-01 RX ADMIN — ATORVASTATIN CALCIUM 80 MG: 20 TABLET, FILM COATED ORAL at 08:02

## 2022-02-01 RX ADMIN — CHLORHEXIDINE GLUCONATE 0.12% ORAL RINSE 15 ML: 1.2 LIQUID ORAL at 08:02

## 2022-02-01 RX ADMIN — LISINOPRIL 10 MG: 10 TABLET ORAL at 09:02

## 2022-02-01 RX ADMIN — SODIUM CHLORIDE: 0.9 INJECTION, SOLUTION INTRAVENOUS at 06:02

## 2022-02-01 RX ADMIN — ONDANSETRON 4 MG: 2 INJECTION INTRAMUSCULAR; INTRAVENOUS at 01:02

## 2022-02-01 RX ADMIN — FUROSEMIDE 40 MG: 40 TABLET ORAL at 11:02

## 2022-02-01 RX ADMIN — Medication 400 MG: at 06:02

## 2022-02-01 RX ADMIN — FAMOTIDINE 20 MG: 20 TABLET ORAL at 08:02

## 2022-02-01 RX ADMIN — OXYCODONE HYDROCHLORIDE AND ACETAMINOPHEN 1 TABLET: 5; 325 TABLET ORAL at 05:02

## 2022-02-01 NOTE — PLAN OF CARE
Problem: Occupational Therapy Goal  Goal: Occupational Therapy Goal  Description: Goals set on 1/29 with expiration date 2/12:  Patient will increase functional independence with ADLs by performing:    Supine <> Sit with [place].  Grooming while standing at sink with Mod [place] using DME as needed.   UB Dressing with [place].  LB Dressing with [place].  Step transfer with Mod [place] with DME as needed.  Pt will demonstrate understanding of education provided regarding energy conservation and task modification through teach-back method.     Outcome: Ongoing, Progressing

## 2022-02-01 NOTE — PROGRESS NOTES
[facility]  Heart Transplant  Progress Note    Patient Name: Rocco France  MRN: [id number]  Admission Date: 1/27/2022  Hospital Length of Stay: 5 days  Attending Physician: Mike Chauhan MD  Primary Care Provider: Teresa Mendoza NP  Principal Problem:Bilateral subdural hematomas    Subjective:     Interval History: Required 30mg IV hydralazine overnight in total for MAPs in low 100s. Increased lisinopril to 10mg BID, will monitor on that dosing. Still c/o headache, 2/10 this AM. CTH overnight with evolving subdural, resolved subarachnoid. Will discuss with NS regarding optimization of headache regimen. Continue to hold AC, INR 1.3 today. Per NS, would like to hold at least 7 days; but preferably 14d if possible. Na 136 today, off of hypertonic saline since yesterday. NCC continues to follow. Appreciate both NS and NCC assistance.      Continuous Infusions:   sodium chloride 0.9% Stopped (01/31/22 1438)     Scheduled Meds:   atorvastatin  80 mg Oral Daily    chlorhexidine  15 mL Mouth/Throat BID    famotidine  20 mg Oral BID    furosemide  40 mg Oral Daily    levETIRAcetam  500 mg Oral BID    lisinopriL  10 mg Oral BID    mupirocin   Nasal BID     PRN Meds:sodium chloride, acetaminophen, dextrose 50%, glucagon (human recombinant), insulin aspart U-100, oxyCODONE-acetaminophen    Review of patient's allergies indicates:   Allergen Reactions    Pcn [penicillins] Hives     Objective:     Vital Signs (Most Recent):  Temp: 98.3 °F (36.8 °C) (02/01/22 1100)  Pulse: 104 (02/01/22 1200)  Resp: 16 (02/01/22 1200)  BP: (!) 98/0 (pulsatile) (02/01/22 1118)  SpO2: (!) 90 % (02/01/22 1200) Vital Signs (24h Range):  Temp:  [98.3 °F (36.8 °C)-98.8 °F (37.1 °C)] 98.3 °F (36.8 °C)  Pulse:  [] 104  Resp:  [11-25] 16  SpO2:  [90 %-100 %] 90 %  BP: ()/(0) 98/0  Arterial Line BP: (101-143)/() 103/78     Patient Vitals for the past 72 hrs (Last 3 readings):   Weight   02/01/22 0258 86.5  kg (190 lb 11.2 oz)   01/30/22 0600 84.5 kg (186 lb 4.6 oz)     Body mass index is 24.48 kg/m².      Intake/Output Summary (Last 24 hours) at 2/1/2022 1207  Last data filed at 2/1/2022 1200  Gross per 24 hour   Intake 628.17 ml   Output 1000 ml   Net -371.83 ml       Hemodynamic Parameters:        Telemetry: NSR     Physical Exam  Constitutional:       Appearance: Normal appearance.   HENT:      Head: Normocephalic.      Nose: Nose normal.   Neck:      Comments: JVP at about midneck at 45 degrees  Cardiovascular:      Rate and Rhythm: Normal rate and regular rhythm.      Pulses: Normal pulses.      Heart sounds: Normal heart sounds.      Comments: VAD hum present  Pulmonary:      Effort: Pulmonary effort is normal.      Breath sounds: Normal breath sounds.   Abdominal:      General: Abdomen is flat. Bowel sounds are normal.      Palpations: Abdomen is soft.   Musculoskeletal:         General: Normal range of motion.      Cervical back: Normal range of motion.   Skin:     General: Skin is warm.      Capillary Refill: Capillary refill takes less than 2 seconds.   Neurological:      General: No focal deficit present.      Mental Status: He is alert and oriented to person, place, and time.         Significant Labs:  CBC:  Recent Labs   Lab 01/30/22 0335 01/31/22 0450 02/01/22  0341   WBC 6.78 6.64 9.60   RBC 3.33* 3.22* 3.76*   HGB 9.5* 9.2* 10.6*   HCT 30.9* 31.0* 34.6*    115* 155   MCV 93 96 92   MCH 28.5 28.6 28.2   MCHC 30.7* 29.7* 30.6*     BNP:  Recent Labs   Lab 01/27/22 2055 01/31/22  0450   * 586*     CMP:  Recent Labs   Lab 01/27/22 2055 01/28/22  0816 01/30/22  0335 01/30/22  0340 01/31/22  0450 01/31/22  1118 01/31/22  1736 01/31/22  2307 02/01/22  0341   *   < > 107  --  131*  --   --   --  166*   CALCIUM 9.0   < > 8.4*  --  8.2*  --   --   --  8.1*   ALBUMIN 2.6*  --   --   --  2.2*  --   --   --   --    PROT 8.7*  --   --   --  7.1  --   --   --   --    *   < > 143   < >  142   < > 138 136 136   K 4.6   < > 4.0  --  4.1  --   --   --  4.2   CO2 22*   < > 28  --  22*  --   --   --  19*      < > 109  --  112*  --   --   --  110   BUN 33*   < > 19  --  15  --   --   --  10   CREATININE 1.3   < > 0.9  --  0.8  --   --   --  0.7   ALKPHOS 120  --   --   --  102  --   --   --   --    ALT 16  --   --   --  11  --   --   --   --    AST 38  --   --   --  24  --   --   --   --    BILITOT 1.6*  --   --   --  0.9  --   --   --   --     < > = values in this interval not displayed.      Coagulation:   Recent Labs   Lab 01/30/22 0335 01/31/22 0450 02/01/22  0341   INR 1.5* 1.5* 1.3*   APTT 36.0* 30.4 31.9     LDH:  Recent Labs   Lab 01/30/22 0335 01/31/22  0450 02/01/22  0341    390* 276*     Microbiology:  Microbiology Results (last 7 days)     ** No results found for the last 168 hours. **          I have reviewed all pertinent labs within the past 24 hours.    Estimated Creatinine Clearance: 120.7 mL/min (based on SCr of 0.7 mg/dL).    Diagnostic Results:  I have reviewed and interpreted all pertinent imaging results/findings within the past 24 hours.    Assessment and Plan:     65 yo BM with stage D CHF due to NICMP s/p HM3, s/p ICD, HTN, HLD, T2DM was transferred from outside hospital emergency room after he was found to have subdural hematoma/subarachnoid hemorrhage.  Patient presented to the ER after he was found to have a syncopal event.  Also patient had respiratory distress on arrival for which he was intubated.  Patient was transferred here for further evaluation.  On arrival patient is intubated and is on propofol.  Neurosurgery and Neuro Critical Care were immediately informed.  Neurosurgery recommended to repeat CT after reviewing the prior CT done in the ER in [place].  INR on arrival is 1.6.  He also had a low flow with a increased PI around 4:00 p.m. this evening.  According to wife patient was complaining of headache for the last 2 weeks..  [place] went [place] post office  this afternoon and she does not know what exactly happened..  ICD was interrogated and did not show any VT/VF.       * Bilateral subdural hematomas  -Patient had a traumatic subdural, subarachnoid hemorrhage after fall on 1/27. Currently no focal deficits  -NCC and NSGY following. Plans for serial imaging pending  -INR 1.6 on admit, 1.3 this morning. Coumadin/ASA on hold. Per NS, would like to hold at least 7 days, preferably 14d if possible.  -Patient had elevated maps above 90 on arrival, but overnight required addition of low dose Levophed to keep MAP > 65. Off of levo since 1/29, reintroduced lisinopril at lower dose 1/31 for MAPs in 90s. Increased to 10mg BID today for suboptimal BP control. Required 30mg IV hydralazine overnight.  -Goal Na+ > 135, 136 this morning off of hypertonic saline per NCC 1/31 with Na at 142  -Keppra started as AED. EEG negative for seizures  -Transcranial dopplers done 1/31 without evidence of vasospasm  -CTH overnight with evolving subdural hematoma, resolved subarachnoid  -Continue q1h neurochecks    Acute respiratory failure requiring reintubation  -Intubated prior to transfer for respiratory distress  -Patient is currently on contact isolation for reported +ve Covid test at OSH prior to transfer. CXR unremarkable. Repeat COVID test positive  - Extubated 1/28 and on RA  - Tx'd with remdesivir which was since discontinued. Asymptomatic from covid perspective    LVAD (left ventricular assist device) present  -S/P DT HM3 1/13/21  -Current speed 5000  -INR goal 2.0-3.0. Current INR 1.3. Coumadin and ASA remain on hold  -LDH stable    Procedure: Device Interrogation Including analysis of device parameters  Current Settings: Ventricular Assist Device  Review of device function is stable/unstable stable         TXP LVAD INTERROGATIONS 2/1/2022 2/1/2022 2/1/2022 2/1/2022 2/1/2022 2/1/2022 2/1/2022   Type - - - - - HeartMate3 HeartMate3   Flow 3.5 3.5 3.2 3.5 3.4 3.3 3.6   Speed 5000 5000  5000 5000 5000 5000 5050   PI 8.8 8.6 9.9 8.6 9.1 10.1 9.4   Power (Edwards) 3.5 3.4 3.4 3.5 3.5 3.4 3.5   LSL 4600 4600 4600 4600 4600 4600 4600   Pulsatility Pulse Pulse Intermittent pulse Intermittent pulse Intermittent pulse Intermittent pulse Intermittent pulse       Type 2 diabetes mellitus without complication  -SSI  ACHS     Essential hypertension  -Levophed off. MAP goal >65  -Reinitiated home hypertensives 1/31 with lisinopril at lower dose than PTA. Uptitrated to 10mg BID as suboptimal BP control. PTA dosing was 10mg qam and 20mg qhs.  -Has required hydralazine prn  Uninterrupted Critical Care/Counseling Time (not including procedures): 60 minutes      Valentina Jose PA-C  Heart Transplant  [facility]

## 2022-02-01 NOTE — NURSING
Pt.'s MAP continues to sustain in mid 90's despite starting PO lisinopril and one time dose of PO hydralazine.  Pump flow trending 2.8 to 3.1.  ROSA Higuera MD made aware with order for IV hydralazine.  Noted effective with noted MAP in 70's and 80's.  Will continue to monitor.

## 2022-02-01 NOTE — PLAN OF CARE
CICU DAILY GOALS       A: Awake    RASS: Goal - RASS Goal: 0-->alert and calm  Actual - RASS (Jackson Agitation-Sedation Scale): 0-->alert and calm   Restraint necessity: Clinical Justification: Removing medical devices  B: Breath   SBT: Not intubated   C: Coordinate A & B, analgesics/sedatives   Pain: managed    SAT: Not intubated  D: Delirium   CAM-ICU: Overall CAM-ICU: Negative  E: Early(intubated/ Progressive (non-intubated) Mobility   MOVE Screen: Pass   Activity: Activity Management: Rolling - L1  FAS: Feeding/Nutrition   Diet order: Diet/Nutrition Received: low saturated fat/low cholesterol,2 gram sodium,   Fluid restriction: Fluid Requirement: 1000 mL FR  T: Thrombus   DVT prophylaxis: VTE Required Core Measure: Per order contraindicated for SCDs/Anticoagulants  H: HOB Elevation   Head of Bed (HOB) Positioning: HOB at 30 degrees  U: Ulcer Prophylaxis   GI: yes  G: Glucose control   managed Glycemic Management: blood glucose monitored  S: Skin   Bundle compliance: yes   Bathing/Skin Care: back care,bath, complete,dressed/undressed,electrode patches/site rotation,linen changed Date: 2/1/2022  B: Bowel Function   no issues   I: Indwelling Catheters   Duarte necessity: [REMOVED]      Urethral Catheter 01/27/22 1858-Reason for Continuing Urinary Catheterization: Critically ill in ICU and requiring hourly monitoring of intake/output   CVC necessity: No   IPAD offered: No  D: De-escalation Antibx   No  Plan for the day  -Keep MAP between 65-90, monitor LVAD device and site, doppler pulses Q4, watch sodium levels, Q1hr neuro checks  Family/Goals of care/Code Status   Code Status: Full Code     No acute events throughout night, VS and assessment per flow sheet, patient progressing towards goals as tolerated, plan of care reviewed with Rocco France and family, all concerns addressed, will continue to monitor.

## 2022-02-01 NOTE — PT/OT/SLP PROGRESS
Physical Therapy Co-Treatment    Patient Name:  Rocco France   MRN:  [id number]  Admitting Diagnosis:  Bilateral subdural hematomas   Recent Surgery: * No surgery found *    Admit Date: 1/27/2022  Length of Stay: 5 days    Recommendations:     Discharge Recommendations:  rehabilitation facility   Discharge Equipment Recommendations: none   Barriers to discharge: trending medical condition, weakness, impaired activity tolerance, impaired balance, increased fall risk    Assessment:     Rocco France is a 66 y.o. male admitted with a medical diagnosis of Bilateral subdural hematomas.  He presents with the following impairments/functional limitations:  weakness,impaired endurance,impaired self care skills,impaired functional mobilty,gait instability,impaired balance,decreased upper extremity function,decreased lower extremity function,decreased safety awareness,pain,impaired coordination,impaired cardiopulmonary response to activity. Pt tolerated session well today. Pt with good progress towards goals on this date - as seen by decreased assist needed to perform sit <> stand transfer. Pt also able to tolerate taking steps to bedside chair on this date with no LOB. Pt's HA increased from 2/10 at rest to 3/10 with activity. All other VSS. Pt is functioning well below prior independent level and is not safe to return home at this time.    Pt is an excellent candidate for inpatient rehabilitation, as pt has a qualifying diagnosis, displays good activity tolerance, has experienced decline from PLOF, has good family support, and is motivated to participate in therapy. Pt's clinical condition meets full inpatient rehab criteria. Inpatient rehab will provide to total interdisciplinary treatment approach needed. Pt is at high risk of unplanned readmission due to fall risk. The lower level of care cannot provide total interdisciplinary approach needed.     Pt will continue to benefit from skilled PT services during this  hospital admit to continue to improve transfer ability and efficiency as well as continue to progress pt's ambulation distance and cardiopulmonary endurance to maximize pt's functional independence and return to PLOF.      Rehab Prognosis: Good; patient would benefit from acute skilled PT services to address these deficits and reach maximum level of function.    Recent Surgery: * No surgery found *      Plan:     During this hospitalization, patient to be seen 4 x/week to address the identified rehab impairments via gait training,therapeutic exercises,therapeutic activities,neuromuscular re-education and progress toward the following goals:    · Plan of Care Expires:  03/03/22    Subjective     RN notified prior to session. No family/friends present upon PT entrance into room.    Chief Complaint: Pt agreeable to participate in PT session  Patient/Family Comments/goals: get stronger  Pain/Comfort:  · Pain Rating 1: 2/10 (at rest)  · Location - Side 1: Bilateral  · Location - Orientation 1: generalized  · Location 1: head  · Pain Addressed 1: Reposition,Distraction,Nurse notified  · Pain Rating Post-Intervention 1: 3/10 (sitting upright and standing)      Objective:     Additional staff present: OT for cotx due to pt's multiple medical comorbidities and functional deficits req'ing two skilled therapists to appropriately progress pt's musculoskeletal strength, neuromuscular control, and endurance while taking into consideration severe medical acuity in the ICU    Patient found HOB elevated with: telemetry,blood pressure cuff,pulse ox (continuous),LVAD,arterial line,peripheral IV   Cognition:   · Alert and Cooperative  · AxOx4  · Command following: Follows multistep verbal commands  · Fluency: clear/fluent  General Precautions: Standard, Cardiac fall,LVAD,airborne,contact,droplet   Orthopedic Precautions:N/A   Braces: N/A   Body mass index is 24.48 kg/m².  Oxygen Device: Room Air  Vitals: BP (!) 104/0 (BP Location: Left  "arm, Patient Position: Lying) Comment: pulsatile  Pulse 98   Temp 98.4 °F (36.9 °C) (Oral)   Resp 17   Ht 6' 2" (1.88 m)   Wt 86.5 kg (190 lb 11.2 oz)   SpO2 (!) 94%   BMI 24.48 kg/m²     Outcome Measures:  AM-PAC 6 CLICK MOBILITY  Turning over in bed (including adjusting bedclothes, sheets and blankets)?: 3  Sitting down on and standing up from a chair with arms (e.g., wheelchair, bedside commode, etc.): 3  Moving from lying on back to sitting on the side of the bed?: 3  Moving to and from a bed to a chair (including a wheelchair)?: 3  Need to walk in hospital room?: 3  Climbing 3-5 steps with a railing?: 2  Basic Mobility Total Score: 17     Functional Mobility:    Bed Mobility:   · Supine to Sit: contact guard assistance; from Rt side of bed  · Scooting anteriorly to EOB to have both feet planted on floor: stand by assistance    Sitting Balance at Edge of Bed:   Static Sitting Balance: Good- : able to accept minimal resistance   Dynamic Sitting Balance: Good- : able to sit unsupported and weight shift across midline minimally   Assistance Level Required: Stand-by Assistance   Time: ~8 minutes   Postural deviations noted: slouched posture, rounded shoulders and forward head   Comments: Time EOB focused primarily on tolerance to upright positioning, cardiopulmonary response and endurance for activities, and strength of postural musculature to perform dynamic sitting to prepare for tasks in the home. Pt able to accept internal perturbations to balance while seated EOB with appropriate trunk response to remain upright with no LOB and no UE support. Able to perform reaches inside TAMI.       Transfers:   · Sit <> Stand Transfer: contact guard assistance with no assistive device   · Stand <> Sit Transfer: contact guard assistance with no assistive device   · [id number] from EOB  · Bed <> Chair Transfer: Stand Pivot technique with contact guard assistance with no assistive device  · Chair on patient's " Lt    Standing Balance:   Static Standing Balance: Good- : able to maintain standing balance against minimal resistance   Dynamic Standing Balance: Fair : stand independently unsupported, weight shift, and reach ipsilaterally. LOB noted when crossing midline.   Assistance Level Required: Contact Guard Assistance   Patient used: no assistive device       Gait:  · Patient ambulated: ~3 steps to stand pivot to bedside chair   · Patient required: contact guard  · Patient used:  hand-held assist   · Gait Pattern observed: step-to  · Gait Deviation(s): decreased step length, wide base of support, shuffle gait, flexed posture and decreased yayo  · Impairments due to: impaired balance, decreased strength and decreased endurance  · all lines remained intact throughout ambulation trial  · Comments: Pt able to take steps to chair with CGA for patient safety to guide hips back into chair. Pt with no UE support needed. Increased lateral sway to assist with clearing feet in swing phase.    Education:   Time provided for education, counseling and discussion of health disposition in regards to patient's current status   All questions answered within PT scope of practice and to patient's satisfaction   PT role in POC to address current functional deficits   Pt educated on proper body mechanics, safety techniques, and energy conservation with PT facilitation and cueing throughout session   Call nursing/pct to transfer to chair/use bathroom. Pt stated understanding.   Whiteboard updated with therapist name and pt's current mobility status documented above   Safe to perform stand pivot transfer to/from chair/bedside commode CGA x 2 persons for line mgmt and no AD w/ nursing/PCT present   Importance of OOB tolerance 3-4 hrs/day to improve lung ventilation and expansion as well as strengthen postural musculature   LVAD: patient found on wall power / returned on wall power. Ni alarms sounded.     Patient left up in chair  with all lines intact, call button in reach, RN notified and rN present.    GOALS:   Multidisciplinary Problems     Physical Therapy Goals        Problem: Physical Therapy Goal    Goal Priority Disciplines Outcome Goal Variances Interventions   Physical Therapy Goal     PT, PT/OT Ongoing, Progressing     Description: Goals to be met by: 2022      Patient will increase functional independence with mobility by performin. Supine to sit with Modified [place]  2. Sit to stand transfer with Modified [place]  3. Bed to chair transfer with Modified [place] using the least restrictive device.   4. Gait  x 150 feet with Modified [place] using the least restrictive device.                     Time Tracking:     PT Received On: 22  PT Start Time: 925     PT Stop Time: 945  PT Total Time (min): 20 min     Billable Minutes:   · Therapeutic Activity 20 minutes    Treatment Type: Treatment  PT/PTA: PT       Rosa Sevilla PT, DPT  2022  Pager: [phone]

## 2022-02-01 NOTE — SUBJECTIVE & OBJECTIVE
Interval History: No acute events. TCD wnl, CTH improved SDH and interval resolution SAH    Medications:  Continuous Infusions:   sodium chloride 0.9% Stopped (01/31/22 1438)     Scheduled Meds:   atorvastatin  80 mg Oral Daily    chlorhexidine  15 mL Mouth/Throat BID    famotidine  20 mg Oral BID    levETIRAcetam  500 mg Oral BID    lisinopriL  10 mg Oral BID    mupirocin   Nasal BID     PRN Meds:sodium chloride, acetaminophen, dextrose 50%, glucagon (human recombinant), insulin aspart U-100     Review of Systems    Objective:     Weight: 86.5 kg (190 lb 11.2 oz)  Body mass index is 24.48 kg/m².  Vital Signs (Most Recent):  Temp: 98.4 °F (36.9 °C) (02/01/22 0700)  Pulse: 88 (02/01/22 0800)  Resp: (!) 22 (02/01/22 0800)  BP: (!) 104/0 (pulsatile) (02/01/22 0756)  SpO2: 98 % (02/01/22 0800) Vital Signs (24h Range):  Temp:  [97.8 °F (36.6 °C)-98.8 °F (37.1 °C)] 98.4 °F (36.9 °C)  Pulse:  [] 88  Resp:  [11-22] 22  SpO2:  [97 %-100 %] 98 %  BP: ()/(0-84) 104/0  Arterial Line BP: (101-143)/() 108/71     Date 02/01/22 0700 - 02/02/22 0659   Shift 1241-6738 9736-5288 6200-2601 24 Hour Total   INTAKE   P.O. 0   0   Shift Total(mL/kg) 0(0)   0(0)   OUTPUT   Shift Total(mL/kg)       Weight (kg) 86.5 86.5 86.5 86.5                        NG/OG Tube 01/27/22 2140 18 Fr. Center mouth (Active)   $ NG/OG Tube Placement Complete 01/27/22 2130   Placement Check placement verified by x-ray;placement verified by aspirate characteristics 01/27/22 2309   Tolerance no signs/symptoms of discomfort 01/27/22 2309   Securement secured to commercial device 01/27/22 2309   Clamp Status/Tolerance clamped 01/27/22 2309   Suction Setting/Drainage Method suction at the bedside 01/27/22 2309   Insertion Site Appearance no redness, warmth, tenderness, skin breakdown, drainage 01/27/22 2309   Flush/Irrigation flushed w/;water;no resistance met 01/27/22 2309            Urethral Catheter 01/27/22 1858 (Active)   $ Duarte  "Insertion Complete 01/27/22 1947   Site Assessment Clean;Intact 01/28/22 0300   Collection Container Urimeter 01/28/22 0300   Securement Method secured to top of thigh w/ adhesive device 01/28/22 0300   Catheter Care Performed no 01/28/22 0300   Reason for Continuing Urinary Catheterization Critically ill in ICU and requiring hourly monitoring of intake/output 01/28/22 0300   CAUTI Prevention Bundle StatLock in place w 1" slack;Intact seal between catheter & drainage tubing;Drainage bag/urimeter off the floor;Sheeting clip in use;No dependent loops or kinks;Drainage bag/urimeter not overfilled (<2/3 full);Drainage bag/urimeter below bladder 01/28/22 0300   Output (mL) 55 mL 01/28/22 1000       Physical Exam:    Constitutional: He appears well-nourished. No distress.     Eyes: Pupils are equal, round, and reactive to light. EOM are normal.     Cardiovascular: Normal rate and regular rhythm.     Abdominal: Soft.     Psych/Behavior: He is alert.     E4VtM6  Alert  Answers questions appropriately by nodding/shaking head  PERRL  EOMI  Face Symmetric  BUE 5/5  BLE 5/5  No drift      Significant Labs:  Recent Labs   Lab 01/31/22  0450 01/31/22  1118 01/31/22  1736 01/31/22  2307 02/01/22 0341   *  --   --   --  166*      < > 138 136 136   K 4.1  --   --   --  4.2   *  --   --   --  110   CO2 22*  --   --   --  19*   BUN 15  --   --   --  10   CREATININE 0.8  --   --   --  0.7   CALCIUM 8.2*  --   --   --  8.1*   MG 1.6  --   --   --  1.7    < > = values in this interval not displayed.     Recent Labs   Lab 01/31/22 0450 02/01/22 0341   WBC 6.64 9.60   HGB 9.2* 10.6*   HCT 31.0* 34.6*   * 155     Recent Labs   Lab 01/31/22 0450 02/01/22 0341   INR 1.5* 1.3*   APTT 30.4 31.9     Microbiology Results (last 7 days)     ** No results found for the last 168 hours. **        All pertinent labs from the last 24 hours have been reviewed.    Significant Diagnostics:  I have reviewed and interpreted all " pertinent imaging results/findings within the past 24 hours.

## 2022-02-01 NOTE — ASSESSMENT & PLAN NOTE
-S/P DT HM3 1/13/21  -Current speed 5000  -INR goal 2.0-3.0. Current INR 1.3. Coumadin and ASA remain on hold  -LDH stable    Procedure: Device Interrogation Including analysis of device parameters  Current Settings: Ventricular Assist Device  Review of device function is stable/unstable stable         TXP LVAD INTERROGATIONS 2/1/2022 2/1/2022 2/1/2022 2/1/2022 2/1/2022 2/1/2022 2/1/2022   Type - - - - - HeartMate3 HeartMate3   Flow 3.5 3.5 3.2 3.5 3.4 3.3 3.6   Speed 5000 5000 5000 5000 5000 5000 5050   PI 8.8 8.6 9.9 8.6 9.1 10.1 9.4   Power (Edwards) 3.5 3.4 3.4 3.5 3.5 3.4 3.5   LSL 4600 4600 4600 4600 4600 4600 4600   Pulsatility Pulse Pulse Intermittent pulse Intermittent pulse Intermittent pulse Intermittent pulse Intermittent pulse

## 2022-02-01 NOTE — PROGRESS NOTES
"02/01/2022  Micah Velez Jr    Current provider:  Deana Lake MD    Device interrogation:  TXP LVAD INTERROGATIONS 2/1/2022 2/1/2022 2/1/2022 2/1/2022 2/1/2022 2/1/2022 2/1/2022   Type - HeartMate3 HeartMate3 HeartMate3 HeartMate3 HeartMate3 HeartMate3   Flow 3.4 3.3 3.6 3.3 3.3 3.5 3.5   Speed 5000 5000 5050 5000 5000 5050 5000   PI 9.1 10.1 9.4 9.9 9 8.8 9.5   Power (Edwards) 3.5 3.4 3.5 3.5 3.5 3.5 3.6   LSL 4600 4600 4600 4600 4600 4600 4600   Pulsatility Intermittent pulse Intermittent pulse Intermittent pulse Intermittent pulse Intermittent pulse Intermittent pulse Intermittent pulse          As floor rounding has been requested to be limited during COVID- patient quarantine by Infectious Disease and leadership, only "electronic" rounding has been performed on this mechanical assist device patient.  Electronic rounding includes verifying in Epic that current settings and measurements for the device have been documented appropriately and that they are within limits.  Additionally, this rounding verifies that the EQUIPMENT section of the VAD flowsheet is documented in Epic, specifically checking the presence of emergency equipment and controller self test.  Any discrepancies will be related to the care team. Noted not to have backup equipment documented.  LVAD office notified.    For implantable VADs: Interrogation of Ventricular assist device was performed with analysis of device parameters and review of device function. I have personally reviewed the interrogation findings and agree with findings as stated.     In emergency, the nursing units have been notified to contact the perfusion department either by:  Calling [phone] from 630am to 4pm Mon thru Fri, or by contacting the hospital  from 4pm to 630am and on weekends and asking to speak with the perfusionist on call.    Micah Velez Jr  8:39 AM  "

## 2022-02-01 NOTE — PROGRESS NOTES
[facility] - Cardiac Intensive Care  Neurosurgery  Progress Note    Subjective:     History of Present Illness: 66M PMHx cardiomyopathy, s/p LVAD on coumadin and CAD s/p stenting on ASA, presenting after fall, consulted to NSGY for SDH. Pt had syncopal event earlier today with prodrome of dizziness, fell, hit head, went to OSH for workup and was complaining of occipital H/A. At OSH, pt had acute neurologic decline, had to be intubated, and presents to [facility] intubated, sedated and with no motor activity. However, 2 hours after presentation to [facility], pt's neurologic status has improved to awake, alert, and following commands briskly with minor headache.       Post-Op Info:  * No surgery found *         Interval History: No acute events. TCD wnl, CTH improved SDH and interval resolution SAH    Medications:  Continuous Infusions:   sodium chloride 0.9% Stopped (01/31/22 1438)     Scheduled Meds:   atorvastatin  80 mg Oral Daily    chlorhexidine  15 mL Mouth/Throat BID    famotidine  20 mg Oral BID    levETIRAcetam  500 mg Oral BID    lisinopriL  10 mg Oral BID    mupirocin   Nasal BID     PRN Meds:sodium chloride, acetaminophen, dextrose 50%, glucagon (human recombinant), insulin aspart U-100     Review of Systems    Objective:     Weight: 86.5 kg (190 lb 11.2 oz)  Body mass index is 24.48 kg/m².  Vital Signs (Most Recent):  Temp: 98.4 °F (36.9 °C) (02/01/22 0700)  Pulse: 88 (02/01/22 0800)  Resp: (!) 22 (02/01/22 0800)  BP: (!) 104/0 (pulsatile) (02/01/22 0756)  SpO2: 98 % (02/01/22 0800) Vital Signs (24h Range):  Temp:  [97.8 °F (36.6 °C)-98.8 °F (37.1 °C)] 98.4 °F (36.9 °C)  Pulse:  [] 88  Resp:  [11-22] 22  SpO2:  [97 %-100 %] 98 %  BP: ()/(0-84) 104/0  Arterial Line BP: (101-143)/() 108/71     Date 02/01/22 0700 - 02/02/22 0659   Shift 2201-73182924 4144-2383 9340-0659 24 Hour Total   INTAKE   P.O. 0   0   Shift Total(mL/kg) 0(0)   0(0)   OUTPUT   Shift Total(mL/kg)       Weight (kg) 86.5  "86.5 86.5 86.5                        NG/OG Tube 01/27/22 2140 18 Fr. Center mouth (Active)   $ NG/OG Tube Placement Complete 01/27/22 2130   Placement Check placement verified by x-ray;placement verified by aspirate characteristics 01/27/22 2309   Tolerance no signs/symptoms of discomfort 01/27/22 2309   Securement secured to commercial device 01/27/22 2309   Clamp Status/Tolerance clamped 01/27/22 2309   Suction Setting/Drainage Method suction at the bedside 01/27/22 2309   Insertion Site Appearance no redness, warmth, tenderness, skin breakdown, drainage 01/27/22 2309   Flush/Irrigation flushed w/;water;no resistance met 01/27/22 2309            Urethral Catheter 01/27/22 1858 (Active)   $ Duarte Insertion Complete 01/27/22 1947   Site Assessment Clean;Intact 01/28/22 0300   Collection Container Urimeter 01/28/22 0300   Securement Method secured to top of thigh w/ adhesive device 01/28/22 0300   Catheter Care Performed no 01/28/22 0300   Reason for Continuing Urinary Catheterization Critically ill in ICU and requiring hourly monitoring of intake/output 01/28/22 0300   CAUTI Prevention Bundle StatLock in place w 1" slack;Intact seal between catheter & drainage tubing;Drainage bag/urimeter off the floor;Sheeting clip in use;No dependent loops or kinks;Drainage bag/urimeter not overfilled (<2/3 full);Drainage bag/urimeter below bladder 01/28/22 0300   Output (mL) 55 mL 01/28/22 1000       Physical Exam:    Constitutional: He appears well-nourished. No distress.     Eyes: Pupils are equal, round, and reactive to light. EOM are normal.     Cardiovascular: Normal rate and regular rhythm.     Abdominal: Soft.     Psych/Behavior: He is alert.     E4VtM6  Alert  Answers questions appropriately by nodding/shaking head  PERRL  EOMI  Face Symmetric  BUE 5/5  BLE 5/5  No drift      Significant Labs:  Recent Labs   Lab 01/31/22  0450 01/31/22  1118 01/31/22  1736 01/31/22  2307 02/01/22  0341   *  --   --   --  166*   NA " 142   < > 138 136 136   K 4.1  --   --   --  4.2   *  --   --   --  110   CO2 22*  --   --   --  19*   BUN 15  --   --   --  10   CREATININE 0.8  --   --   --  0.7   CALCIUM 8.2*  --   --   --  8.1*   MG 1.6  --   --   --  1.7    < > = values in this interval not displayed.     Recent Labs   Lab 01/31/22 0450 02/01/22  0341   WBC 6.64 9.60   HGB 9.2* 10.6*   HCT 31.0* 34.6*   * 155     Recent Labs   Lab 01/31/22 0450 02/01/22 0341   INR 1.5* 1.3*   APTT 30.4 31.9     Microbiology Results (last 7 days)     ** No results found for the last 168 hours. **        All pertinent labs from the last 24 hours have been reviewed.    Significant Diagnostics:  I have reviewed and interpreted all pertinent imaging results/findings within the past 24 hours.        Assessment/Plan:     Subarachnoid bleed  66M PMHx cardiomyopathy, s/p LVAD on coumadin and CAD s/p stenting on ASA, presenting after syncopal event and fall with hitting head, found to have bilateral SDH R>L on CT at OSH, rpt CT with new cisternal SAH, negative aneurysm or vascular malformation on CTA, likely nonaneurysmal perimesencephalic SAH        --recommend transcranial dopplers to monitor for vasospasm.   -- CICU primary  -- q1 hour vitals/neurochecks  -- SBP < 140  -- Na > 135  -- Keppra 500 BID  -- INR 1.6 >> 1.3   -- Hold ASA/coumadin: no need for reversal at this time  -- All imaging reviewed   -- CTH 1/27 OSH: bilat aucte on chronic SDH, R > L, no shift, no hydro, cisterns open.    -- CTH 1/27 rpt: new SAH in interpeduncular and quadrigeminal cisterns   -- CTA 1/27: negative for aneurysm or vascular malformation   -- CTH 2/1 interval improvement of SDH and interval clearance of cisternal SAH   -- No recommendation for angiogram at this time, SAH likely related to venous plexus bleed   --seizure and AEDs per NCC         Emile Ann MD  Neurosurgery  [facility] - Cardiac Intensive Care

## 2022-02-01 NOTE — ASSESSMENT & PLAN NOTE
-Levophed off. MAP goal >65  -Reinitiated home hypertensives 1/31 with lisinopril at lower dose than PTA. Uptitrated to 10mg BID as suboptimal BP control. PTA dosing was 10mg qam and 20mg qhs.  -Has required hydralazine prn

## 2022-02-01 NOTE — PT/OT/SLP PROGRESS
Occupational Therapy  Co- Treatment    Name: Rocco France  MRN: [id number]  Admitting Diagnosis:  Bilateral subdural hematomas   /LVAD  Co-treatment performed due to patient's multiple deficits requiring two skilled therapists in ICU setting to appropriately and safely assess patient's strength and endurance while facilitating functional tasks in addition to accommodating for patient's activity tolerance.     Recommendations:     Discharge Recommendations: rehabilitation facility  Discharge Equipment Recommendations:  none  Barriers to discharge:  Inaccessible home environment,Decreased caregiver support    Assessment:     Rocco France is a 66 y.o. male with a medical diagnosis of Bilateral subdural hematomas/LVAD  He presents with deficits in functional mobility and higher level ADL tasks involving standing. Pt. Required CGA for mobility on this date with headache level remaining between 2 to 3 throughout the session.  BP only slightly decreased with standing activities. Performance deficits affecting function are weakness,impaired endurance,impaired self care skills,impaired functional mobilty,impaired cardiopulmonary response to activity,gait instability,pain,impaired balance. Pt. Making good improvements withmobility and would benefit from continued OT services.     Rehab Prognosis:  Good; patient would benefit from acute skilled OT services to address these deficits and reach maximum level of function.       Plan:     Patient to be seen 4 x/week to address the above listed problems via self-care/home management,therapeutic activities,therapeutic exercises  · Plan of Care Expires: 02/28/22  · Plan of Care Reviewed with: patient    Subjective     Pain/Comfort:  · Pain Rating 1: 2/10  · Location 1: head  · Pain Addressed 1: Reposition,Distraction,Nurse notified  · Pain Rating Post-Intervention 1: 3/10    Objective:     Communicated with: nurse prior to session.  Patient found supine with LVAD,arterial  line,peripheral IV upon OT entry to room.    General Precautions: Standard, fall,droplet,contact,airborne,LVAD,respiratory   Orthopedic Precautions:N/A   Braces: N/A  Respiratory Status: Room air     Occupational Performance:     Bed Mobility:    · Patient completed Supine to Sit with contact guard assistance     Functional Mobility/Transfers:  · Patient completed Sit <> Stand Transfer with contact guard assistance  with  no assistive device   · Patient completed Bed <> Chair Transfer using Stand Pivot technique with contact guard assistance with no assistive device  · Functional Mobility: not tested    Activities of Daily Living:  · Grooming: supervision to wash face seated      [facility] 6 Click ADL: 17    Treatment & Education:  Pt. educated on importance of OOB/therapy  Pt. Educated on safety with transfers     Patient left up in chair with all lines intact, call button in reach and nurse notifiedEducation:      GOALS:   Multidisciplinary Problems     Occupational Therapy Goals        Problem: Occupational Therapy Goal    Goal Priority Disciplines Outcome Interventions   Occupational Therapy Goal     OT, PT/OT Ongoing, Progressing    Description: Goals set on 1/29 with expiration date 2/12:  Patient will increase functional independence with ADLs by performing:    Supine <> Sit with [place].  Grooming while standing at sink with Mod [place] using DME as needed.   UB Dressing with [place].  LB Dressing with [place].  Step transfer with Mod [place] with DME as needed.  Pt will demonstrate understanding of education provided regarding energy conservation and task modification through teach-back method.                      Time Tracking:     OT Date of Treatment: 02/01/22  OT Start Time: 0925  OT Stop Time: 0945  OT Total Time (min): 20 min    Billable Minutes:Self Care/Home Management 20    OT/MADISON: OT          2/1/2022

## 2022-02-01 NOTE — PROGRESS NOTES
Interdisciplinary Rounds Report:   Attended interdisciplinary rounds with the [facility]/CTS services including the LVAD Coordinators, social workers, cardiologists, surgeons,  PT/OT/Speech, dietician, and unit charge nurses. Discussed patient status, plan of care, goals of care, including DC date, and post discharge needs. Plan of care will be discussed with the patient and/or family per the physician while rounding on the floor. This is a recurring meeting that is medically and socially necessary to collaborate with the interdisciplinary team to assist patient needs and safe discharge.

## 2022-02-01 NOTE — PLAN OF CARE
CICU DAILY GOALS       A: Awake    RASS: Goal - RASS Goal: 0-->alert and calm  Actual - RASS (Jackson Agitation-Sedation Scale): 0-->alert and calm   Restraint necessity: Clinical Justification: Removing medical devices  B: Breath   SBT: Not intubated   C: Coordinate A & B, analgesics/sedatives   Pain: managed    SAT: Not intubated  D: Delirium   CAM-ICU: Overall CAM-ICU: Negative  E: Early(intubated/ Progressive (non-intubated) Mobility   MOVE Screen: Fail   Activity: Activity Management: Rolling - L1  FAS: Feeding/Nutrition   Diet order: Diet/Nutrition Received: low saturated fat/low cholesterol,   Fluid restriction:    T: Thrombus   DVT prophylaxis: VTE Required Core Measure: Per order contraindicated for SCDs/Anticoagulants  H: HOB Elevation   Head of Bed (HOB) Positioning: HOB elevated  U: Ulcer Prophylaxis   GI: yes  G: Glucose control   managed Glycemic Management: blood glucose monitored  S: Skin   Bundle compliance: yes   Bathing/Skin Care: dressed/undressed Date: 1/31/22  B: Bowel Function   no issues   I: Indwelling Catheters   Duarte necessity: [REMOVED]      Urethral Catheter 01/27/22 1858-Reason for Continuing Urinary Catheterization: Critically ill in ICU and requiring hourly monitoring of intake/output   CVC necessity: No   IPAD offered: No  D: De-escalation Antibx   No  Plan for the day  - Antihypertensives restarted  - Continue to monitor neuro Q1H    Family/Goals of care/Code Status   Code Status: Full Code     No acute events throughout day, VS and assessment per flow sheet, patient progressing towards goals as tolerated, plan of care reviewed with Rocco France and family, all concerns addressed, will continue to monitor.   headaches/vomiting

## 2022-02-01 NOTE — SUBJECTIVE & OBJECTIVE
Interval History: Required 30mg IV hydralazine overnight in total for MAPs in low 100s. Increased lisinopril to 10mg BID, will monitor on that dosing. Still c/o headache, 2/10 this AM. CTH overnight with evolving subdural, resolved subarachnoid. Will discuss with NS regarding optimization of headache regimen. Continue to hold AC, INR 1.3 today. Per NS, would like to hold at least 7 days; but preferably 14d if possible. Na 136 today, off of hypertonic saline since yesterday. NCC continues to follow. Appreciate both NS and NCC assistance.      Continuous Infusions:   sodium chloride 0.9% Stopped (01/31/22 1438)     Scheduled Meds:   atorvastatin  80 mg Oral Daily    chlorhexidine  15 mL Mouth/Throat BID    famotidine  20 mg Oral BID    furosemide  40 mg Oral Daily    levETIRAcetam  500 mg Oral BID    lisinopriL  10 mg Oral BID    mupirocin   Nasal BID     PRN Meds:sodium chloride, acetaminophen, dextrose 50%, glucagon (human recombinant), insulin aspart U-100, oxyCODONE-acetaminophen    Review of patient's allergies indicates:   Allergen Reactions    Pcn [penicillins] Hives     Objective:     Vital Signs (Most Recent):  Temp: 98.3 °F (36.8 °C) (02/01/22 1100)  Pulse: 104 (02/01/22 1200)  Resp: 16 (02/01/22 1200)  BP: (!) 98/0 (pulsatile) (02/01/22 1118)  SpO2: (!) 90 % (02/01/22 1200) Vital Signs (24h Range):  Temp:  [98.3 °F (36.8 °C)-98.8 °F (37.1 °C)] 98.3 °F (36.8 °C)  Pulse:  [] 104  Resp:  [11-25] 16  SpO2:  [90 %-100 %] 90 %  BP: ()/(0) 98/0  Arterial Line BP: (101-143)/() 103/78     Patient Vitals for the past 72 hrs (Last 3 readings):   Weight   02/01/22 0258 86.5 kg (190 lb 11.2 oz)   01/30/22 0600 84.5 kg (186 lb 4.6 oz)     Body mass index is 24.48 kg/m².      Intake/Output Summary (Last 24 hours) at 2/1/2022 1207  Last data filed at 2/1/2022 1200  Gross per 24 hour   Intake 628.17 ml   Output 1000 ml   Net -371.83 ml       Hemodynamic Parameters:        Telemetry:  NSR     Physical Exam  Constitutional:       Appearance: Normal appearance.   HENT:      Head: Normocephalic.      Nose: Nose normal.   Neck:      Comments: JVP at about midneck at 45 degrees  Cardiovascular:      Rate and Rhythm: Normal rate and regular rhythm.      Pulses: Normal pulses.      Heart sounds: Normal heart sounds.      Comments: VAD hum present  Pulmonary:      Effort: Pulmonary effort is normal.      Breath sounds: Normal breath sounds.   Abdominal:      General: Abdomen is flat. Bowel sounds are normal.      Palpations: Abdomen is soft.   Musculoskeletal:         General: Normal range of motion.      Cervical back: Normal range of motion.   Skin:     General: Skin is warm.      Capillary Refill: Capillary refill takes less than 2 seconds.   Neurological:      General: No focal deficit present.      Mental Status: He is alert and oriented to person, place, and time.         Significant Labs:  CBC:  Recent Labs   Lab 01/30/22 0335 01/31/22 0450 02/01/22  0341   WBC 6.78 6.64 9.60   RBC 3.33* 3.22* 3.76*   HGB 9.5* 9.2* 10.6*   HCT 30.9* 31.0* 34.6*    115* 155   MCV 93 96 92   MCH 28.5 28.6 28.2   MCHC 30.7* 29.7* 30.6*     BNP:  Recent Labs   Lab 01/27/22 2055 01/31/22 0450   * 586*     CMP:  Recent Labs   Lab 01/27/22 2055 01/28/22  0816 01/30/22  0335 01/30/22  0340 01/31/22  0450 01/31/22  1118 01/31/22  1736 01/31/22  2307 02/01/22  0341   *   < > 107  --  131*  --   --   --  166*   CALCIUM 9.0   < > 8.4*  --  8.2*  --   --   --  8.1*   ALBUMIN 2.6*  --   --   --  2.2*  --   --   --   --    PROT 8.7*  --   --   --  7.1  --   --   --   --    *   < > 143   < > 142   < > 138 136 136   K 4.6   < > 4.0  --  4.1  --   --   --  4.2   CO2 22*   < > 28  --  22*  --   --   --  19*      < > 109  --  112*  --   --   --  110   BUN 33*   < > 19  --  15  --   --   --  10   CREATININE 1.3   < > 0.9  --  0.8  --   --   --  0.7   ALKPHOS 120  --   --   --  102  --   --   --    --    ALT 16  --   --   --  11  --   --   --   --    AST 38  --   --   --  24  --   --   --   --    BILITOT 1.6*  --   --   --  0.9  --   --   --   --     < > = values in this interval not displayed.      Coagulation:   Recent Labs   Lab 01/30/22 0335 01/31/22 0450 02/01/22 0341   INR 1.5* 1.5* 1.3*   APTT 36.0* 30.4 31.9     LDH:  Recent Labs   Lab 01/30/22 0335 01/31/22 0450 02/01/22 0341    390* 276*     Microbiology:  Microbiology Results (last 7 days)     ** No results found for the last 168 hours. **          I have reviewed all pertinent labs within the past 24 hours.    Estimated Creatinine Clearance: 120.7 mL/min (based on SCr of 0.7 mg/dL).    Diagnostic Results:  I have reviewed and interpreted all pertinent imaging results/findings within the past 24 hours.

## 2022-02-01 NOTE — ASSESSMENT & PLAN NOTE
66M PMHx cardiomyopathy, s/p LVAD on coumadin and CAD s/p stenting on ASA, presenting after syncopal event and fall with hitting head, found to have bilateral SDH R>L on CT at OSH, rpt CT with new cisternal SAH, negative aneurysm or vascular malformation on CTA, likely nonaneurysmal perimesencephalic SAH        --recommend transcranial dopplers to monitor for vasospasm.   -- CICU primary  -- q1 hour vitals/neurochecks  -- SBP < 140  -- Na > 135  -- Keppra 500 BID  -- INR 1.6 >> 1.3   -- Hold ASA/coumadin: no need for reversal at this time  -- All imaging reviewed   -- CTH 1/27 OSH: bilat aucte on chronic SDH, R > L, no shift, no hydro, cisterns open.    -- CTH 1/27 rpt: new SAH in interpeduncular and quadrigeminal cisterns   -- CTA 1/27: negative for aneurysm or vascular malformation   -- CTH 2/1 interval improvement of SDH and interval clearance of cisternal SAH   -- No recommendation for angiogram at this time, SAH likely related to venous plexus bleed   --seizure and AEDs per NCC

## 2022-02-01 NOTE — NURSING
MD notified of pt MAP staying in the high 90s to low 100s for an hour. MD ordered now dose of 20mg hydralazine IV. Will administer as ordered. MARS.

## 2022-02-01 NOTE — PLAN OF CARE
CICU DAILY GOALS       A: Awake    RASS: Goal - RASS Goal: 0-->alert and calm  Actual - RASS (Jackson Agitation-Sedation Scale): 0-->alert and calm   Restraint necessity: Clinical Justification: Removing medical devices  B: Breath   SBT: Not intubated   C: Coordinate A & B, analgesics/sedatives   Pain: managed    SAT: Not intubated  D: Delirium   CAM-ICU: Overall CAM-ICU: Negative  E: Early(intubated/ Progressive (non-intubated) Mobility   MOVE Screen: Pass   Activity: Activity Management: Standing - L3 (back to bed)  FAS: Feeding/Nutrition   Diet order: Diet/Nutrition Received: low saturated fat/low cholesterol,   Fluid restriction: Fluid Requirement: 1000 cc FR  T: Thrombus   DVT prophylaxis: VTE Required Core Measure: Per order contraindicated for SCDs/Anticoagulants  H: HOB Elevation   Head of Bed (HOB) Positioning: HOB elevated  U: Ulcer Prophylaxis   GI: yes  G: Glucose control   managed Glycemic Management: blood glucose monitored  S: Skin   Bundle compliance: yes   Bathing/Skin Care: dressed/undressed,linen changed Date: 2/1/22  B: Bowel Function   no issues   I: Indwelling Catheters   Duarte necessity: [REMOVED]      Urethral Catheter 01/27/22 7448-Reason for Continuing Urinary Catheterization: Critically ill in ICU and requiring hourly monitoring of intake/output   CVC necessity: No   IPAD offered: No  D: De-escalation Antibx   No  Plan for the day  - Continue neuro checks Q1H  - Managing blood pressure. Hydralazine and lisinopril given  - Started PO lasix    Family/Goals of care/Code Status   Code Status: Full Code     No acute events throughout day, VS and assessment per flow sheet, patient progressing towards goals as tolerated, plan of care reviewed with Rocco France and family, all concerns addressed, will continue to monitor.

## 2022-02-01 NOTE — ASSESSMENT & PLAN NOTE
-Patient had a traumatic subdural, subarachnoid hemorrhage after fall on 1/27. Currently no focal deficits  -NCC and NSGY following. Plans for serial imaging pending  -INR 1.6 on admit, 1.3 this morning. Coumadin/ASA on hold. Per NS, would like to hold at least 7 days, preferably 14d if possible.  -Patient had elevated maps above 90 on arrival, but overnight required addition of low dose Levophed to keep MAP > 65. Off of levo since 1/29, reintroduced lisinopril at lower dose 1/31 for MAPs in 90s. Increased to 10mg BID today for suboptimal BP control. Required 30mg IV hydralazine overnight.  -Goal Na+ > 135, 136 this morning off of hypertonic saline per NCC 1/31 with Na at 142  -Keppra started as AED. EEG negative for seizures  -Transcranial dopplers done 1/31 without evidence of vasospasm  -CTH overnight with evolving subdural hematoma, resolved subarachnoid  -Continue q1h neurochecks

## 2022-02-02 LAB
ALBUMIN SERPL BCP-MCNC: 2.5 G/DL (ref 3.5–5.2)
ALLENS TEST: ABNORMAL
ALP SERPL-CCNC: 116 U/L (ref 55–135)
ALT SERPL W/O P-5'-P-CCNC: 6 U/L (ref 10–44)
ANION GAP SERPL CALC-SCNC: 11 MMOL/L (ref 8–16)
ANISOCYTOSIS BLD QL SMEAR: SLIGHT
APTT BLDCRRT: 28.5 SEC (ref 21–32)
AST SERPL-CCNC: 17 U/L (ref 10–40)
BASOPHILS # BLD AUTO: 0.01 K/UL (ref 0–0.2)
BASOPHILS NFR BLD: 0.1 % (ref 0–1.9)
BILIRUB DIRECT SERPL-MCNC: 0.7 MG/DL (ref 0.1–0.3)
BILIRUB SERPL-MCNC: 1.7 MG/DL (ref 0.1–1)
BNP SERPL-MCNC: 996 PG/ML (ref 0–99)
BUN SERPL-MCNC: 12 MG/DL (ref 8–23)
BURR CELLS BLD QL SMEAR: ABNORMAL
CALCIUM SERPL-MCNC: 8.5 MG/DL (ref 8.7–10.5)
CHLORIDE SERPL-SCNC: 107 MMOL/L (ref 95–110)
CO2 SERPL-SCNC: 18 MMOL/L (ref 23–29)
CREAT SERPL-MCNC: 0.8 MG/DL (ref 0.5–1.4)
CRP SERPL-MCNC: 33.1 MG/L (ref 0–8.2)
DIFFERENTIAL METHOD: ABNORMAL
EOSINOPHIL # BLD AUTO: 0.1 K/UL (ref 0–0.5)
EOSINOPHIL NFR BLD: 0.7 % (ref 0–8)
ERYTHROCYTE [DISTWIDTH] IN BLOOD BY AUTOMATED COUNT: 14.8 % (ref 11.5–14.5)
EST. GFR  (AFRICAN AMERICAN): >60 ML/MIN/1.73 M^2
EST. GFR  (NON AFRICAN AMERICAN): >60 ML/MIN/1.73 M^2
GLUCOSE SERPL-MCNC: 149 MG/DL (ref 70–110)
HCO3 UR-SCNC: 24 MMOL/L (ref 24–28)
HCT VFR BLD AUTO: 34.9 % (ref 40–54)
HGB BLD-MCNC: 11.1 G/DL (ref 14–18)
HYPOCHROMIA BLD QL SMEAR: ABNORMAL
IMM GRANULOCYTES # BLD AUTO: 0.05 K/UL (ref 0–0.04)
IMM GRANULOCYTES NFR BLD AUTO: 0.5 % (ref 0–0.5)
INR PPP: 1.2 (ref 0.8–1.2)
LDH SERPL L TO P-CCNC: 281 U/L (ref 110–260)
LYMPHOCYTES # BLD AUTO: 1.8 K/UL (ref 1–4.8)
LYMPHOCYTES NFR BLD: 16.3 % (ref 18–48)
MAGNESIUM SERPL-MCNC: 2 MG/DL (ref 1.6–2.6)
MCH RBC QN AUTO: 28.6 PG (ref 27–31)
MCHC RBC AUTO-ENTMCNC: 31.8 G/DL (ref 32–36)
MCV RBC AUTO: 90 FL (ref 82–98)
MONOCYTES # BLD AUTO: 1.1 K/UL (ref 0.3–1)
MONOCYTES NFR BLD: 9.7 % (ref 4–15)
NEUTROPHILS # BLD AUTO: 8 K/UL (ref 1.8–7.7)
NEUTROPHILS NFR BLD: 72.7 % (ref 38–73)
NRBC BLD-RTO: 0 /100 WBC
OVALOCYTES BLD QL SMEAR: ABNORMAL
PCO2 BLDA: 31.5 MMHG (ref 35–45)
PH SMN: 7.49 [PH] (ref 7.35–7.45)
PHOSPHATE SERPL-MCNC: 2.2 MG/DL (ref 2.7–4.5)
PLATELET # BLD AUTO: 178 K/UL (ref 150–450)
PMV BLD AUTO: 12.1 FL (ref 9.2–12.9)
PO2 BLDA: 126 MMHG (ref 80–100)
POC BE: 1 MMOL/L
POC SATURATED O2: 99 % (ref 95–100)
POC TCO2: 25 MMOL/L (ref 23–27)
POCT GLUCOSE: 140 MG/DL (ref 70–110)
POCT GLUCOSE: 142 MG/DL (ref 70–110)
POCT GLUCOSE: 148 MG/DL (ref 70–110)
POCT GLUCOSE: 151 MG/DL (ref 70–110)
POIKILOCYTOSIS BLD QL SMEAR: SLIGHT
POLYCHROMASIA BLD QL SMEAR: ABNORMAL
POTASSIUM SERPL-SCNC: 4.2 MMOL/L (ref 3.5–5.1)
PREALB SERPL-MCNC: 9 MG/DL (ref 20–43)
PROT SERPL-MCNC: 7.7 G/DL (ref 6–8.4)
PROTHROMBIN TIME: 13.4 SEC (ref 9–12.5)
RBC # BLD AUTO: 3.88 M/UL (ref 4.6–6.2)
SAMPLE: ABNORMAL
SITE: ABNORMAL
SODIUM SERPL-SCNC: 133 MMOL/L (ref 136–145)
SODIUM SERPL-SCNC: 134 MMOL/L (ref 136–145)
SODIUM SERPL-SCNC: 135 MMOL/L (ref 136–145)
SODIUM SERPL-SCNC: 136 MMOL/L (ref 136–145)
SODIUM SERPL-SCNC: 136 MMOL/L (ref 136–145)
WBC # BLD AUTO: 11.04 K/UL (ref 3.9–12.7)

## 2022-02-02 PROCEDURE — 83615 LACTATE (LD) (LDH) ENZYME: CPT | Performed by: HOSPITALIST

## 2022-02-02 PROCEDURE — 83880 ASSAY OF NATRIURETIC PEPTIDE: CPT | Performed by: HOSPITALIST

## 2022-02-02 PROCEDURE — 85025 COMPLETE CBC W/AUTO DIFF WBC: CPT | Performed by: HOSPITALIST

## 2022-02-02 PROCEDURE — 99233 PR SUBSEQUENT HOSPITAL CARE,LEVL III: ICD-10-PCS | Mod: ,,, | Performed by: NEUROLOGICAL SURGERY

## 2022-02-02 PROCEDURE — 84132 ASSAY OF SERUM POTASSIUM: CPT

## 2022-02-02 PROCEDURE — 99233 SBSQ HOSP IP/OBS HIGH 50: CPT | Mod: ,,, | Performed by: NEUROLOGICAL SURGERY

## 2022-02-02 PROCEDURE — 82803 BLOOD GASES ANY COMBINATION: CPT

## 2022-02-02 PROCEDURE — 93750 INTERROGATION VAD IN PERSON: CPT | Mod: ,,, | Performed by: INTERNAL MEDICINE

## 2022-02-02 PROCEDURE — 36600 WITHDRAWAL OF ARTERIAL BLOOD: CPT

## 2022-02-02 PROCEDURE — 84295 ASSAY OF SERUM SODIUM: CPT | Mod: 91 | Performed by: PHYSICIAN ASSISTANT

## 2022-02-02 PROCEDURE — 63600175 PHARM REV CODE 636 W HCPCS

## 2022-02-02 PROCEDURE — 99291 CRITICAL CARE FIRST HOUR: CPT | Mod: ,,, | Performed by: PHYSICIAN ASSISTANT

## 2022-02-02 PROCEDURE — 85730 THROMBOPLASTIN TIME PARTIAL: CPT | Performed by: HOSPITALIST

## 2022-02-02 PROCEDURE — 63600175 PHARM REV CODE 636 W HCPCS: Performed by: STUDENT IN AN ORGANIZED HEALTH CARE EDUCATION/TRAINING PROGRAM

## 2022-02-02 PROCEDURE — 20000000 HC ICU ROOM

## 2022-02-02 PROCEDURE — 27000207 HC ISOLATION

## 2022-02-02 PROCEDURE — 83735 ASSAY OF MAGNESIUM: CPT | Performed by: HOSPITALIST

## 2022-02-02 PROCEDURE — 63600175 PHARM REV CODE 636 W HCPCS: Performed by: PHYSICIAN ASSISTANT

## 2022-02-02 PROCEDURE — 99900035 HC TECH TIME PER 15 MIN (STAT)

## 2022-02-02 PROCEDURE — 82565 ASSAY OF CREATININE: CPT

## 2022-02-02 PROCEDURE — 85347 COAGULATION TIME ACTIVATED: CPT

## 2022-02-02 PROCEDURE — 84100 ASSAY OF PHOSPHORUS: CPT | Performed by: HOSPITALIST

## 2022-02-02 PROCEDURE — 82800 BLOOD PH: CPT

## 2022-02-02 PROCEDURE — 80076 HEPATIC FUNCTION PANEL: CPT | Performed by: HOSPITALIST

## 2022-02-02 PROCEDURE — 25000003 PHARM REV CODE 250: Performed by: INTERNAL MEDICINE

## 2022-02-02 PROCEDURE — 85610 PROTHROMBIN TIME: CPT | Performed by: HOSPITALIST

## 2022-02-02 PROCEDURE — 99291 PR CRITICAL CARE, E/M 30-74 MINUTES: ICD-10-PCS | Mod: ,,, | Performed by: PHYSICIAN ASSISTANT

## 2022-02-02 PROCEDURE — 86140 C-REACTIVE PROTEIN: CPT | Performed by: HOSPITALIST

## 2022-02-02 PROCEDURE — 84295 ASSAY OF SERUM SODIUM: CPT | Mod: 91 | Performed by: PSYCHIATRY & NEUROLOGY

## 2022-02-02 PROCEDURE — 27000248 HC VAD-ADDITIONAL DAY

## 2022-02-02 PROCEDURE — 93750 PR INTERROGATE VENT ASSIST DEV, IN PERSON, W PHYSICIAN ANALYSIS: ICD-10-PCS | Mod: ,,, | Performed by: INTERNAL MEDICINE

## 2022-02-02 PROCEDURE — 94761 N-INVAS EAR/PLS OXIMETRY MLT: CPT

## 2022-02-02 PROCEDURE — 25000003 PHARM REV CODE 250: Performed by: HOSPITALIST

## 2022-02-02 PROCEDURE — 84295 ASSAY OF SERUM SODIUM: CPT

## 2022-02-02 PROCEDURE — 84134 ASSAY OF PREALBUMIN: CPT | Performed by: HOSPITALIST

## 2022-02-02 PROCEDURE — 80048 BASIC METABOLIC PNL TOTAL CA: CPT | Performed by: HOSPITALIST

## 2022-02-02 PROCEDURE — 25000003 PHARM REV CODE 250: Performed by: PHYSICIAN ASSISTANT

## 2022-02-02 RX ORDER — DOXAZOSIN 2 MG/1
2 TABLET ORAL NIGHTLY
Status: DISCONTINUED | OUTPATIENT
Start: 2022-02-02 | End: 2022-02-02

## 2022-02-02 RX ORDER — HYDRALAZINE HYDROCHLORIDE 20 MG/ML
20 INJECTION INTRAMUSCULAR; INTRAVENOUS ONCE
Status: COMPLETED | OUTPATIENT
Start: 2022-02-02 | End: 2022-02-02

## 2022-02-02 RX ORDER — ONDANSETRON 4 MG/1
4 TABLET, ORALLY DISINTEGRATING ORAL ONCE
Status: COMPLETED | OUTPATIENT
Start: 2022-02-02 | End: 2022-02-02

## 2022-02-02 RX ORDER — FUROSEMIDE 40 MG/1
40 TABLET ORAL 2 TIMES DAILY
Status: DISCONTINUED | OUTPATIENT
Start: 2022-02-02 | End: 2022-02-08

## 2022-02-02 RX ORDER — DOXAZOSIN 2 MG/1
2 TABLET ORAL 2 TIMES DAILY
Status: DISCONTINUED | OUTPATIENT
Start: 2022-02-02 | End: 2022-02-07

## 2022-02-02 RX ORDER — HYDRALAZINE HYDROCHLORIDE 20 MG/ML
10 INJECTION INTRAMUSCULAR; INTRAVENOUS ONCE
Status: COMPLETED | OUTPATIENT
Start: 2022-02-02 | End: 2022-02-02

## 2022-02-02 RX ORDER — 3% SODIUM CHLORIDE 3 G/100ML
30 INJECTION, SOLUTION INTRAVENOUS CONTINUOUS
Status: DISCONTINUED | OUTPATIENT
Start: 2022-02-02 | End: 2022-02-03

## 2022-02-02 RX ADMIN — MUPIROCIN: 20 OINTMENT TOPICAL at 09:02

## 2022-02-02 RX ADMIN — MUPIROCIN: 20 OINTMENT TOPICAL at 08:02

## 2022-02-02 RX ADMIN — OXYCODONE HYDROCHLORIDE AND ACETAMINOPHEN 1 TABLET: 5; 325 TABLET ORAL at 08:02

## 2022-02-02 RX ADMIN — FUROSEMIDE 40 MG: 40 TABLET ORAL at 05:02

## 2022-02-02 RX ADMIN — LISINOPRIL 10 MG: 10 TABLET ORAL at 09:02

## 2022-02-02 RX ADMIN — OXYCODONE HYDROCHLORIDE AND ACETAMINOPHEN 1 TABLET: 5; 325 TABLET ORAL at 05:02

## 2022-02-02 RX ADMIN — FAMOTIDINE 20 MG: 20 TABLET ORAL at 08:02

## 2022-02-02 RX ADMIN — DOXAZOSIN 2 MG: 2 TABLET ORAL at 11:02

## 2022-02-02 RX ADMIN — ONDANSETRON 4 MG: 4 TABLET, ORALLY DISINTEGRATING ORAL at 08:02

## 2022-02-02 RX ADMIN — HYDRALAZINE HYDROCHLORIDE 20 MG: 20 INJECTION, SOLUTION INTRAMUSCULAR; INTRAVENOUS at 09:02

## 2022-02-02 RX ADMIN — CHLORHEXIDINE GLUCONATE 0.12% ORAL RINSE 15 ML: 1.2 LIQUID ORAL at 09:02

## 2022-02-02 RX ADMIN — FAMOTIDINE 20 MG: 20 TABLET ORAL at 09:02

## 2022-02-02 RX ADMIN — HYDRALAZINE HYDROCHLORIDE 10 MG: 20 INJECTION INTRAMUSCULAR; INTRAVENOUS at 01:02

## 2022-02-02 RX ADMIN — DOXAZOSIN 2 MG: 2 TABLET ORAL at 09:02

## 2022-02-02 RX ADMIN — ATORVASTATIN CALCIUM 80 MG: 20 TABLET, FILM COATED ORAL at 08:02

## 2022-02-02 RX ADMIN — LISINOPRIL 10 MG: 10 TABLET ORAL at 08:02

## 2022-02-02 RX ADMIN — SODIUM CHLORIDE 30 ML/HR: 3 INJECTION, SOLUTION INTRAVENOUS at 04:02

## 2022-02-02 RX ADMIN — LEVETIRACETAM 500 MG: 500 TABLET, FILM COATED ORAL at 09:02

## 2022-02-02 RX ADMIN — LEVETIRACETAM 500 MG: 500 TABLET, FILM COATED ORAL at 08:02

## 2022-02-02 RX ADMIN — OXYCODONE HYDROCHLORIDE AND ACETAMINOPHEN 1 TABLET: 5; 325 TABLET ORAL at 11:02

## 2022-02-02 RX ADMIN — FUROSEMIDE 40 MG: 40 TABLET ORAL at 08:02

## 2022-02-02 NOTE — NURSING
0910: CT Jose informed of pt's  and PI in th 9's, jill vad coordinator also informed of PI 9. CT Montgomery informed will order hydralazine.     1100: BP inc with MAPs of 95 and PI also inc as well. CT Montgomery informed. PA will order cardura for BP control    1230: CT Montgomery informed of MAPs 90-95. Doppler pressures correlating. No new orders at this time. Will continue to monitor.     1352: ectopy noted on cardiac rhythm. pt HR in the 130's sustained. Pt asymptomatic. MAP 90-95. Unable to get EKG due to artifact. CT Montgomery informed.     1410: Eloisa Montgomery at bedside. Pt's HR back in 90's. Unable to take EKG due to too much artifact from LVAD. Doppler pressure 90/0    1425: pt in and out of tachycardia in the 120's, asympotamtic, portable ekg attempted, but unable to get EKG, artifact. CT Mongtomery at bedside.     1636: per pharmacists Alba and Catalina gray to run 3% at 30mL/hr through PIV in abscence of central line.    .  CICU DAILY GOALS   2/2/2022    A: Awake    RASS: Goal - RASS Goal: 0-->alert and calm  Actual - RASS (Jackson Agitation-Sedation Scale): -1-->drowsy   Restraint necessity:  B: Breath   SBT: Not intubated   C: Coordinate A & B, analgesics/sedatives   Pain: managed    SAT: Not intubated  D: Delirium   CAM-ICU: Overall CAM-ICU: Negative  E: Early(intubated/ Progressive (non-intubated) Mobility   MOVE Screen: Fail   Activity: Activity Management: Rolling - L1  FAS: Feeding/Nutrition   Diet order: Diet/Nutrition Received: low saturated fat/low cholesterol,2 gram sodium,   Fluid restriction: Fluid Requirement: 1000mL FR  T: Thrombus   DVT prophylaxis: VTE Required Core Measure: Per order contraindicated for SCDs/Anticoagulants  H: HOB Elevation   Head of Bed (HOB) Positioning: HOB at 45 degrees  U: Ulcer Prophylaxis   GI: yes  G: Glucose control   managed Glycemic Management: blood glucose monitored  S: Skin   Bundle compliance: yes   Bathing/Skin Care: electrode patches/site  rotation,dressed/undressed Date: 2/2/2022  B: Bowel Function   no issues   I: Indwelling Catheters   Duarte necessity: [REMOVED]      Urethral Catheter 01/27/22 0478-Reason for Continuing Urinary Catheterization: Critically ill in ICU and requiring hourly monitoring of intake/output   CVC necessity: No   IPAD offered: No  D: De-escalation Antibx   No  Plan for the day     Family/Goals of care/Code Status   Code Status: Full Code      VS and assessment per flow sheet, patient progressing towards goals as tolerated, plan of care reviewed with Rocco France and family, all concerns addressed, will continue to monitor.

## 2022-02-02 NOTE — NURSING
Pt unable to take PO hydralazine for hypertension d/t nausea/dry heaving/hiccups.  MAP 90-100s.    Discussed with MD Higuera - new order for IVP hydralazine 20 mg x 1 for hypertension.  Will give pt isopropyl alcohol soaked gauze to smell for nausea relief.

## 2022-02-02 NOTE — PROGRESS NOTES
"02/02/2022  Alex Hutchins    Current provider:  Mike Chauhan MD    Device interrogation:  TXP LVAD INTERROGATIONS 2/2/2022 2/2/2022 2/2/2022 2/2/2022 2/2/2022 2/2/2022 2/2/2022   Type HeartMate3 HeartMate3 HeartMate3 HeartMate3 HeartMate3 HeartMate3 HeartMate3   Flow 3.4 4.1 3.9 3.7 3.9 3.9 3.8   Speed 5000 5000 5000 5000 5000 5000 5000   PI 9 6.9 7 8.1 8 7.3 6.7   Power (Edwards) 3.5 3.5 3.4 3.5 3.4 3.5 3.4   LSL 4500 4600 4600 4600 4600 4600 4600   Pulsatility Pulse Pulse Pulse Pulse Pulse Pulse Pulse          As floor rounding has been requested to be limited during COVID-19 patient quarantine by Infectious Disease and leadership, only "electronic" rounding has been performed on this mechanical assist device patient.  Electronic rounding includes verifying in Epic that current settings and measurements for the device have been documented appropriately and that they are within limits.  Additionally, this rounding verifies that the EQUIPMENT section of the VAD flowsheet is documented in Epic, specifically checking the presence of emergency equipment and controller self test.  Any discrepancies will be related to the care team.    For implantable VADs: Interrogation of Ventricular assist device was performed with analysis of device parameters and review of device function. I have personally reviewed the interrogation findings and agree with findings as stated.     In emergency, the nursing units have been notified to contact the perfusion department either by:  Calling [phone] from 630am to 4pm Mon thru Fri, or by contacting the hospital  from 4pm to 630am and on weekends and asking to speak with the perfusionist on call.    Alex Hutchins  8:23 AM  "

## 2022-02-02 NOTE — ASSESSMENT & PLAN NOTE
-Patient had a traumatic subdural, subarachnoid hemorrhage after fall on 1/27. Currently no focal deficits  -NCC and NSGY following. Plans for serial imaging pending  -INR 1.6 on admit, 1.2 this morning. Coumadin/ASA on hold. Per NS, would like to hold at least 7 days, preferably 14d if possible.  -Patient had elevated maps above 90 on arrival, but overnight required addition of low dose Levophed to keep MAP > 65. Off of levo since 1/29, reintroduced lisinopril at lower dose 1/31 for MAPs in 90s. Increased to 10mg BID but still with suboptimal bp control. Start doxazosin 2mg BID today.   -Goal Na+ > 135, 136 this morning off of hypertonic saline per NCC 1/31. Slowly trickling down this afternoon, discussed with NCC and will restart hypertonic saline.  -Keppra started as AED. EEG negative for seizures  -Transcranial dopplers done 1/31 without evidence of vasospasm  -CTH overnight with evolving subdural hematoma, resolved subarachnoid  -Continue q1h neurochecks

## 2022-02-02 NOTE — ASSESSMENT & PLAN NOTE
-Levophed off. MAP goal >65  -Reinitiated home hypertensives 1/31 with lisinopril at lower dose than PTA. Uptitrated to 10mg BID as suboptimal BP control. PTA dosing was 10mg qam and 20mg qhs.  -Has required hydralazine prn  -Add doxazosin 2mg BID for better bp control

## 2022-02-02 NOTE — NURSING
Patient's MAP maintained above  during shift.  Given scheduled lisinopril, started PO lasix, and given one time dose of hydralazine.  Followed up with HTS throughout shift re: MAP continuing to be in the mid 90s.    1330- Pt. Had x 1 episode of nausea and vomiting.  MAP 98. K. CT Jose made aware with order for IV zofran and hydralazine.  1830- Pt. MAP trending up to high 90's to low 100's,  No VAD alarms noted.  Frequent PVC's on tele with last Mg 1.8.  ROSA Higuera MD made aware.  Received orders to replace mag and administer PO hydralazine.

## 2022-02-02 NOTE — PLAN OF CARE
Cardiac ICU Care Plan    NAEON. Few episodes of elevated BP with MAPs 90-100s which resolved with IV and PO hydralazine.   Initially c/o headache but resolved with BP control and pain regimen (see MAR).   Poor PO intake.   Neuro check every hour.     POC reviewed with Rocco France and family. Questions and concerns addressed. No acute events today. Pt progressing toward goals. Will continue to monitor. See below and flowsheets for full assessment and VS info.       Neuro:  Nain Coma Scale  Best Eye Response: 4-->(E4) spontaneous  Best Motor Response: 6-->(M6) obeys commands  Best Verbal Response: 5-->(V5) oriented  Nain Coma Scale Score: 15  Assessment Qualifiers: patient not sedated/intubated,no eye obstruction present  Pupil PERRLA: yes    24 hr Temp:  [97.7 °F (36.5 °C)-98.5 °F (36.9 °C)]      CV:  Rhythm: normal sinus rhythm   DVT prophylaxis: VTE Required Core Measure: Pharmacological prophylaxis initiated/maintained                    Type: HeartMate3       Pulses  Right Radial Pulse: 1+ (weak)  Left Radial Pulse: 1+ (weak)  Right Dorsalis Pedis Pulse: Doppler  Left Dorsalis Pedis Pulse: Doppler  Right Posterior Tibial Pulse: Doppler  Left Posterior Tibial Pulse: Doppler    Resp:  O2 Device (Oxygen Therapy): room air  Flow (L/min): 2  Vent Mode: Spont  Set Rate: 18 BPM  Oxygen Concentration (%): 3  Vt Set: 450 mL  PEEP/CPAP: 5 cmH20  Pressure Support: 10 cmH20    GI/:  GI prophylaxis: yes  Diet/Nutrition Received: low saturated fat/low cholesterol,2 gram sodium,restrict fluids  Last Bowel Movement: 01/31/22  Voiding Characteristics: voids spontaneously without difficulty   Intake/Output Summary (Last 24 hours) at 2/2/2022 0548  Last data filed at 2/2/2022 0500  Gross per 24 hour   Intake 497.97 ml   Output 1250 ml   Net -752.03 ml          Labs/Accuchecks:  Recent Labs   Lab 01/31/22  0450 02/01/22  0341 02/02/22  0312   WBC 6.64 9.60 11.04   RBC 3.22* 3.76* 3.88*   HGB 9.2* 10.6* 11.1*   HCT 31.0*  34.6* 34.9*   * 155 178      Recent Labs   Lab 01/31/22  0450 02/01/22  0341 02/02/22  0312   INR 1.5* 1.3* 1.2   APTT 30.4 31.9 28.5      Recent Labs     02/02/22  0312      K 4.2   CO2 18*      BUN 12   CREATININE 0.8   ALKPHOS 116   ALT 6*   AST 17   BILITOT 1.7*     No results for input(s): CPK, CPKMB, MB, TROPONINI in the last 168 hours. No results for input(s): PH, PCO2, PO2, HCO3, POCSATURATED, BE in the last 72 hours.    Electrolytes: N/A - electrolytes WDL  Accuchecks: Q6H    Gtts/LDAs:   sodium chloride 0.9% 5 mL/hr at 02/02/22 0500       Lines/Drains/Airways       Arterial Line              Arterial Line 01/27/22 1947 Left Radial 5 days              Line                   VAD 01/13/21 1211 Left ventricular assist device HeartMate 3 384 days              Peripheral Intravenous Line                   Peripheral IV - Single Lumen 02/01/22 1406 22 G Posterior;Right Hand <1 day         Peripheral IV - Single Lumen 02/01/22 2000 20 G Posterior;Right Forearm <1 day                    Skin/Wounds  Bathing/Skin Care: dressed/undressed;linen changed (02/01/22 0701)  Wounds: No  Wound care consulted: No    Problem: Adult Inpatient Plan of Care  Goal: Plan of Care Review  Outcome: Ongoing, Progressing     Problem: Hemodynamic Instability (Ventricular Assist Device)  Goal: Optimal Blood Flow  Outcome: Ongoing, Progressing     Problem: Right-Sided Heart Compromise (Ventricular Assist Device)  Goal: Effective Right-Sided Heart Function  Outcome: Ongoing, Progressing     Problem: Cognitive Impairment (Stroke, Ischemic/Transient Ischemic Attack)  Goal: Optimal Cognitive Function  Outcome: Ongoing, Progressing

## 2022-02-02 NOTE — PROGRESS NOTES
[facility] - Cardiac Intensive Care  Heart Transplant  Progress Note    Patient Name: Rocco France  MRN: [id number]  Admission Date: 1/27/2022  Hospital Length of Stay: 6 days  Attending Physician: Mike Chauhan MD  Primary Care Provider: Teresa Mendoza NP  Principal Problem:Bilateral subdural hematomas    Subjective:     Interval History: Required additional hydralazine overnight for elevated MAPs. Will start doxazosin 2mg BID today, continue lisinopril 10mg BID. No HA today. Na slowly trickling down, discussed with NCC and will start back on hypertonic saline. INR 1.2 today, off of AC. NS and NCC continue to follow.      Continuous Infusions:   sodium chloride 0.9% 5 mL/hr at 02/02/22 1500     Scheduled Meds:   atorvastatin  80 mg Oral Daily    chlorhexidine  15 mL Mouth/Throat BID    doxazosin  2 mg Oral BID    famotidine  20 mg Oral BID    furosemide  40 mg Oral Daily    levETIRAcetam  500 mg Oral BID    lisinopriL  10 mg Oral BID    mupirocin   Nasal BID     PRN Meds:sodium chloride, acetaminophen, dextrose 50%, glucagon (human recombinant), insulin aspart U-100, oxyCODONE-acetaminophen    Review of patient's allergies indicates:   Allergen Reactions    Pcn [penicillins] Hives     Objective:     Vital Signs (Most Recent):  Temp: 97.9 °F (36.6 °C) (02/02/22 1100)  Pulse: 97 (02/02/22 1500)  Resp: 20 (02/02/22 1500)  BP: (!) 90/0 (02/02/22 1500)  SpO2: 100 % (02/02/22 1500) Vital Signs (24h Range):  Temp:  [97.9 °F (36.6 °C)-98.7 °F (37.1 °C)] 97.9 °F (36.6 °C)  Pulse:  [] 97  Resp:  [10-36] 20  SpO2:  [98 %-100 %] 100 %  BP: ()/(0-102) 90/0  Arterial Line BP: ()/(64-98) 108/81     Patient Vitals for the past 72 hrs (Last 3 readings):   Weight   02/02/22 0300 82.4 kg (181 lb 10.5 oz)   02/01/22 0258 86.5 kg (190 lb 11.2 oz)     Body mass index is 23.32 kg/m².      Intake/Output Summary (Last 24 hours) at 2/2/2022 1542  Last data filed at 2/2/2022 1500  Gross per 24 hour   Intake 409.16  ml   Output 650 ml   Net -240.84 ml       Hemodynamic Parameters:        Telemetry: NSR     Physical Exam  Constitutional:       Appearance: Normal appearance.   HENT:      Head: Normocephalic.      Nose: Nose normal.   Neck:      Comments: JVP at about midneck at 45 degrees  Cardiovascular:      Rate and Rhythm: Normal rate and regular rhythm.      Pulses: Normal pulses.      Heart sounds: Normal heart sounds.      Comments: VAD hum present  Pulmonary:      Effort: Pulmonary effort is normal.      Breath sounds: Normal breath sounds.   Abdominal:      General: Abdomen is flat. Bowel sounds are normal.      Palpations: Abdomen is soft.   Musculoskeletal:         General: Normal range of motion.      Cervical back: Normal range of motion.   Skin:     General: Skin is warm.      Capillary Refill: Capillary refill takes less than 2 seconds.   Neurological:      General: No focal deficit present.      Mental Status: He is alert and oriented to person, place, and time.         Significant Labs:  CBC:  Recent Labs   Lab 01/31/22 0450 02/01/22 0341 02/02/22 0312   WBC 6.64 9.60 11.04   RBC 3.22* 3.76* 3.88*   HGB 9.2* 10.6* 11.1*   HCT 31.0* 34.6* 34.9*   * 155 178   MCV 96 92 90   MCH 28.6 28.2 28.6   MCHC 29.7* 30.6* 31.8*     BNP:  Recent Labs   Lab 01/27/22 2055 01/31/22 0450 02/02/22  0312   * 586* 996*     CMP:  Recent Labs   Lab 01/27/22 2055 01/28/22  0816 01/31/22  0450 01/31/22  1118 02/01/22  0341 02/01/22  1149 02/02/22  0312 02/02/22  1155 02/02/22  1424   *   < > 131*  --  166*  --  149*  --   --    CALCIUM 9.0   < > 8.2*  --  8.1*  --  8.5*  --   --    ALBUMIN 2.6*  --  2.2*  --   --   --  2.5*  --   --    PROT 8.7*  --  7.1  --   --   --  7.7  --   --    *   < > 142   < > 136   < > 136 134* 133*   K 4.6   < > 4.1  --  4.2  --  4.2  --   --    CO2 22*   < > 22*  --  19*  --  18*  --   --       < > 112*  --  110  --  107  --   --    BUN 33*   < > 15  --  10  --  12  --    --    CREATININE 1.3   < > 0.8  --  0.7  --  0.8  --   --    ALKPHOS 120  --  102  --   --   --  116  --   --    ALT 16  --  11  --   --   --  6*  --   --    AST 38  --  24  --   --   --  17  --   --    BILITOT 1.6*  --  0.9  --   --   --  1.7*  --   --     < > = values in this interval not displayed.      Coagulation:   Recent Labs   Lab 01/31/22 0450 02/01/22 0341 02/02/22 0312   INR 1.5* 1.3* 1.2   APTT 30.4 31.9 28.5     LDH:  Recent Labs   Lab 01/31/22 0450 02/01/22 0341 02/02/22 0312   * 276* 281*     Microbiology:  Microbiology Results (last 7 days)     ** No results found for the last 168 hours. **          I have reviewed all pertinent labs within the past 24 hours.    Estimated Creatinine Clearance: 105.6 mL/min (based on SCr of 0.8 mg/dL).    Diagnostic Results:  I have reviewed and interpreted all pertinent imaging results/findings within the past 24 hours.    Assessment and Plan:     67 yo BM with stage D CHF due to NICMP s/p HM3, s/p ICD, HTN, HLD, T2DM was transferred from outside hospital emergency room after he was found to have subdural hematoma/subarachnoid hemorrhage.  Patient presented to the ER after he was found to have a syncopal event.  Also patient had respiratory distress on arrival for which he was intubated.  Patient was transferred here for further evaluation.  On arrival patient is intubated and is on propofol.  Neurosurgery and Neuro Critical Care were immediately informed.  Neurosurgery recommended to repeat CT after reviewing the prior CT done in the ER in [place].  INR on arrival is 1.6.  He also had a low flow with a increased PI around 4:00 p.m. this evening.  According to wife patient was complaining of headache for the last 2 weeks..  [place] went [place] post office this [place] and she does not know what exactly happened..  ICD was interrogated and did not show any VT/VF.       * Bilateral subdural hematomas  -Patient had a traumatic subdural, subarachnoid hemorrhage  after fall on 1/27. Currently no focal deficits  -NCC and NSGY following. Plans for serial imaging pending  -INR 1.6 on admit, 1.2 this morning. Coumadin/ASA on hold. Per NS, would like to hold at least 7 days, preferably 14d if possible.  -Patient had elevated maps above 90 on arrival, but overnight required addition of low dose Levophed to keep MAP > 65. Off of levo since 1/29, reintroduced lisinopril at lower dose 1/31 for MAPs in 90s. Increased to 10mg BID but still with suboptimal bp control. Start doxazosin 2mg BID today.   -Goal Na+ > 135, 136 this morning off of hypertonic saline per NCC 1/31. Slowly trickling down this afternoon, discussed with NCC and will restart hypertonic saline.  -Keppra started as AED. EEG negative for seizures  -Transcranial dopplers done 1/31 without evidence of vasospasm  -CTH overnight with evolving subdural hematoma, resolved subarachnoid  -Continue q1h neurochecks    Acute respiratory failure requiring reintubation  -Intubated prior to transfer for respiratory distress  -Patient is currently on contact isolation for reported +ve Covid test at OSH prior to transfer. CXR unremarkable. Repeat COVID test positive  - Extubated 1/28 and on RA  - Tx'd with remdesivir which was since discontinued. Asymptomatic from covid perspective    LVAD (left ventricular assist device) present  -S/P DT HM3 1/13/21  -Current speed 5000  -INR goal 2.0-3.0. Current INR 1.2. Coumadin and ASA remain on hold  -LDH stable    Procedure: Device Interrogation Including analysis of device parameters  Current Settings: Ventricular Assist Device  Review of device function is stable/unstable stable         TXP LVAD INTERROGATIONS 2/2/2022 2/2/2022 2/2/2022 2/2/2022 2/2/2022 2/2/2022 2/2/2022   Type HeartMate3 HeartMate3 HeartMate3 HeartMate3 HeartMate3 HeartMate3 HeartMate3   Flow 4 4 4.1 4 3.8 4.4 3.5   Speed 5000 5000 5000 5000 5000 5000 5000   PI 6 6.2 5.7 6.5 6.8 4.2 9.3   Power (Edwards) 3.4 3.5 3.5 3.5 3.4  3.4 3.4   LSL 4500 4500 4500 4500 4500 4500 4500   Pulsatility Pulse Pulse Pulse Pulse Pulse Pulse Pulse       Type 2 diabetes mellitus without complication  -SSI  ACHS     Essential hypertension  -Levophed off. MAP goal >65  -Reinitiated home hypertensives 1/31 with lisinopril at lower dose than PTA. Uptitrated to 10mg BID as suboptimal BP control. PTA dosing was 10mg qam and 20mg qhs.  -Has required hydralazine prn  -Add doxazosin 2mg BID for better bp control    Uninterrupted Critical Care/Counseling Time (not including procedures): 65 minutes    Valentina Jose PA-C  Heart Transplant  [facility] - Cardiac Intensive Care

## 2022-02-02 NOTE — SUBJECTIVE & OBJECTIVE
Interval History: No acute events. TCD wnl, CTM    Medications:  Continuous Infusions:   sodium chloride 0.9% 5 mL/hr at 02/02/22 0900     Scheduled Meds:   atorvastatin  80 mg Oral Daily    chlorhexidine  15 mL Mouth/Throat BID    famotidine  20 mg Oral BID    furosemide  40 mg Oral Daily    levETIRAcetam  500 mg Oral BID    lisinopriL  10 mg Oral BID    mupirocin   Nasal BID     PRN Meds:sodium chloride, acetaminophen, dextrose 50%, glucagon (human recombinant), insulin aspart U-100, oxyCODONE-acetaminophen     Review of Systems    Objective:     Weight: 82.4 kg (181 lb 10.5 oz)  Body mass index is 23.32 kg/m².  Vital Signs (Most Recent):  Temp: 98.7 °F (37.1 °C) (02/02/22 0700)  Pulse: 95 (02/02/22 0900)  Resp: 20 (02/02/22 0900)  BP: 120/87 (02/02/22 0937)  SpO2: 98 % (02/02/22 0900) Vital Signs (24h Range):  Temp:  [97.7 °F (36.5 °C)-98.7 °F (37.1 °C)] 98.7 °F (37.1 °C)  Pulse:  [] 95  Resp:  [11-36] 20  SpO2:  [90 %-100 %] 98 %  BP: ()/(0-102) 120/87  Arterial Line BP: ()/(64-98) 117/90     Date 02/02/22 0700 - 02/03/22 0659   Shift 6766-5613 8703-6007 9811-2779 24 Hour Total   INTAKE   I.V.(mL/kg) 15(0.2)   15(0.2)   Shift Total(mL/kg) 15(0.2)   15(0.2)   OUTPUT   Shift Total(mL/kg)       Weight (kg) 82.4 82.4 82.4 82.4                        NG/OG Tube 01/27/22 2140 18 Fr. Center mouth (Active)   $ NG/OG Tube Placement Complete 01/27/22 2130   Placement Check placement verified by x-ray;placement verified by aspirate characteristics 01/27/22 2309   Tolerance no signs/symptoms of discomfort 01/27/22 2309   Securement secured to commercial device 01/27/22 2309   Clamp Status/Tolerance clamped 01/27/22 2309   Suction Setting/Drainage Method suction at the bedside 01/27/22 2309   Insertion Site Appearance no redness, warmth, tenderness, skin breakdown, drainage 01/27/22 2309   Flush/Irrigation flushed w/;water;no resistance met 01/27/22 2309            Urethral Catheter 01/27/22 3054  "(Active)   $ Duarte Insertion Complete 01/27/22 1947   Site Assessment Clean;Intact 01/28/22 0300   Collection Container Urimeter 01/28/22 0300   Securement Method secured to top of thigh w/ adhesive device 01/28/22 0300   Catheter Care Performed no 01/28/22 0300   Reason for Continuing Urinary Catheterization Critically ill in ICU and requiring hourly monitoring of intake/output 01/28/22 0300   CAUTI Prevention Bundle StatLock in place w 1" slack;Intact seal between catheter & drainage tubing;Drainage bag/urimeter off the floor;Sheeting clip in use;No dependent loops or kinks;Drainage bag/urimeter not overfilled (<2/3 full);Drainage bag/urimeter below bladder 01/28/22 0300   Output (mL) 55 mL 01/28/22 1000       Physical Exam:    Constitutional: He appears well-nourished. No distress.     Eyes: Pupils are equal, round, and reactive to light. EOM are normal.     Cardiovascular: Normal rate and regular rhythm.     Abdominal: Soft.     Psych/Behavior: He is alert.     E4VtM6  Alert  Answers questions appropriately by nodding/shaking head  PERRL  EOMI  Face Symmetric  BUE 5/5  BLE 5/5  No drift      Significant Labs:  Recent Labs   Lab 02/01/22  0341 02/01/22  0341 02/01/22  1149 02/01/22  1149 02/01/22  1744 02/01/22  2310 02/02/22 0312   *  --   --   --   --   --  149*      < > 137   < > 137 136 136   K 4.2  --   --   --   --   --  4.2     --   --   --   --   --  107   CO2 19*  --   --   --   --   --  18*   BUN 10  --   --   --   --   --  12   CREATININE 0.7  --   --   --   --   --  0.8   CALCIUM 8.1*  --   --   --   --   --  8.5*   MG 1.7   < > 1.8  --   --  2.1 2.0    < > = values in this interval not displayed.     Recent Labs   Lab 02/01/22  0341 02/02/22 0312   WBC 9.60 11.04   HGB 10.6* 11.1*   HCT 34.6* 34.9*    178     Recent Labs   Lab 02/01/22  0341 02/02/22  0312   INR 1.3* 1.2   APTT 31.9 28.5     Microbiology Results (last 7 days)     ** No results found for the last 168 hours. " **        All pertinent labs from the last 24 hours have been reviewed.    Significant Diagnostics:  I have reviewed and interpreted all pertinent imaging results/findings within the past 24 hours.

## 2022-02-02 NOTE — PROGRESS NOTES
[facility] - Cardiac Intensive Care  Neurosurgery  Progress Note    Subjective:     History of Present Illness: 66M PMHx cardiomyopathy, s/p LVAD on coumadin and CAD s/p stenting on ASA, presenting after fall, consulted to NSGY for SDH. Pt had syncopal event earlier today with prodrome of dizziness, fell, hit head, went to OSH for workup and was complaining of occipital H/A. At OSH, pt had acute neurologic decline, had to be intubated, and presents to [facility] intubated, sedated and with no motor activity. However, 2 hours after presentation to [facility], pt's neurologic status has improved to awake, alert, and following commands briskly with minor headache.       Post-Op Info:  * No surgery found *         Interval History: No acute events. TCD wnl, CTM    Medications:  Continuous Infusions:   sodium chloride 0.9% 5 mL/hr at 02/02/22 0900     Scheduled Meds:   atorvastatin  80 mg Oral Daily    chlorhexidine  15 mL Mouth/Throat BID    famotidine  20 mg Oral BID    furosemide  40 mg Oral Daily    levETIRAcetam  500 mg Oral BID    lisinopriL  10 mg Oral BID    mupirocin   Nasal BID     PRN Meds:sodium chloride, acetaminophen, dextrose 50%, glucagon (human recombinant), insulin aspart U-100, oxyCODONE-acetaminophen     Review of Systems    Objective:     Weight: 82.4 kg (181 lb 10.5 oz)  Body mass index is 23.32 kg/m².  Vital Signs (Most Recent):  Temp: 98.7 °F (37.1 °C) (02/02/22 0700)  Pulse: 95 (02/02/22 0900)  Resp: 20 (02/02/22 0900)  BP: 120/87 (02/02/22 0937)  SpO2: 98 % (02/02/22 0900) Vital Signs (24h Range):  Temp:  [97.7 °F (36.5 °C)-98.7 °F (37.1 °C)] 98.7 °F (37.1 °C)  Pulse:  [] 95  Resp:  [11-36] 20  SpO2:  [90 %-100 %] 98 %  BP: ()/(0-102) 120/87  Arterial Line BP: ()/(64-98) 117/90     Date 02/02/22 0700 - 02/03/22 0659   Shift 4817-5728 0621-0955 1684-4938 24 Hour Total   INTAKE   I.V.(mL/kg) 15(0.2)   15(0.2)   Shift Total(mL/kg) 15(0.2)   15(0.2)   OUTPUT   Shift Total(mL/kg)     "   Weight (kg) 82.4 82.4 82.4 82.4                        NG/OG Tube 01/27/22 2140 18 Fr. Center mouth (Active)   $ NG/OG Tube Placement Complete 01/27/22 2130   Placement Check placement verified by x-ray;placement verified by aspirate characteristics 01/27/22 2309   Tolerance no signs/symptoms of discomfort 01/27/22 2309   Securement secured to commercial device 01/27/22 2309   Clamp Status/Tolerance clamped 01/27/22 2309   Suction Setting/Drainage Method suction at the bedside 01/27/22 2309   Insertion Site Appearance no redness, warmth, tenderness, skin breakdown, drainage 01/27/22 2309   Flush/Irrigation flushed w/;water;no resistance met 01/27/22 2309            Urethral Catheter 01/27/22 1858 (Active)   $ Duarte Insertion Complete 01/27/22 1947   Site Assessment Clean;Intact 01/28/22 0300   Collection Container Urimeter 01/28/22 0300   Securement Method secured to top of thigh w/ adhesive device 01/28/22 0300   Catheter Care Performed no 01/28/22 0300   Reason for Continuing Urinary Catheterization Critically ill in ICU and requiring hourly monitoring of intake/output 01/28/22 0300   CAUTI Prevention Bundle StatLock in place w 1" slack;Intact seal between catheter & drainage tubing;Drainage bag/urimeter off the floor;Sheeting clip in use;No dependent loops or kinks;Drainage bag/urimeter not overfilled (<2/3 full);Drainage bag/urimeter below bladder 01/28/22 0300   Output (mL) 55 mL 01/28/22 1000       Physical Exam:    Constitutional: He appears well-nourished. No distress.     Eyes: Pupils are equal, round, and reactive to light. EOM are normal.     Cardiovascular: Normal rate and regular rhythm.     Abdominal: Soft.     Psych/Behavior: He is alert.     E4VtM6  Alert  Answers questions appropriately by nodding/shaking head  PERRL  EOMI  Face Symmetric  BUE 5/5  BLE 5/5  No drift      Significant Labs:  Recent Labs   Lab 02/01/22  0341 02/01/22  0341 02/01/22  1149 02/01/22  1149 02/01/22  1744 02/01/22  2310 " 02/02/22 0312   *  --   --   --   --   --  149*      < > 137   < > 137 136 136   K 4.2  --   --   --   --   --  4.2     --   --   --   --   --  107   CO2 19*  --   --   --   --   --  18*   BUN 10  --   --   --   --   --  12   CREATININE 0.7  --   --   --   --   --  0.8   CALCIUM 8.1*  --   --   --   --   --  8.5*   MG 1.7   < > 1.8  --   --  2.1 2.0    < > = values in this interval not displayed.     Recent Labs   Lab 02/01/22 0341 02/02/22 0312   WBC 9.60 11.04   HGB 10.6* 11.1*   HCT 34.6* 34.9*    178     Recent Labs   Lab 02/01/22 0341 02/02/22 0312   INR 1.3* 1.2   APTT 31.9 28.5     Microbiology Results (last 7 days)     ** No results found for the last 168 hours. **        All pertinent labs from the last 24 hours have been reviewed.    Significant Diagnostics:  I have reviewed and interpreted all pertinent imaging results/findings within the past 24 hours.          Assessment/Plan:     Subarachnoid bleed  66M PMHx cardiomyopathy, s/p LVAD on coumadin and CAD s/p stenting on ASA, presenting after syncopal event and fall with hitting head, found to have bilateral SDH R>L on CT at OSH, rpt CT with new cisternal SAH, negative aneurysm or vascular malformation on CTA, likely nonaneurysmal perimesencephalic SAH      --recommend transcranial dopplers to monitor for vasospasm.   -- CICU primary  -- q1 hour vitals/neurochecks  -- SBP < 140  -- Na > 135  -- Keppra 500 BID  -- INR 1.6 >> 1.3   -- Hold ASA/coumadin: no need for reversal at this time  -- All imaging reviewed   -- CTH 1/27 OSH: bilat aucte on chronic SDH, R > L, no shift, no hydro, cisterns open.    -- CTH 1/27 rpt: new SAH in interpeduncular and quadrigeminal cisterns   -- CTA 1/27: negative for aneurysm or vascular malformation   -- CTH 2/1 interval improvement of SDH and interval clearance of cisternal SAH   -- No recommendation for angiogram at this time, SAH likely related to venous plexus bleed   --seizure and AEDs per  NCC         Emile Ann MD  Neurosurgery  [facility] - Cardiac Intensive Care

## 2022-02-02 NOTE — SUBJECTIVE & OBJECTIVE
Interval History: Required additional hydralazine overnight for elevated MAPs. Will start doxazosin 2mg BID today, continue lisinopril 10mg BID. No HA today. Na slowly trickling down, discussed with NCC and will start back on hypertonic saline. INR 1.2 today, off of AC. NS and NCC continue to follow.      Continuous Infusions:   sodium chloride 0.9% 5 mL/hr at 02/02/22 1500     Scheduled Meds:   atorvastatin  80 mg Oral Daily    chlorhexidine  15 mL Mouth/Throat BID    doxazosin  2 mg Oral BID    famotidine  20 mg Oral BID    furosemide  40 mg Oral Daily    levETIRAcetam  500 mg Oral BID    lisinopriL  10 mg Oral BID    mupirocin   Nasal BID     PRN Meds:sodium chloride, acetaminophen, dextrose 50%, glucagon (human recombinant), insulin aspart U-100, oxyCODONE-acetaminophen    Review of patient's allergies indicates:   Allergen Reactions    Pcn [penicillins] Hives     Objective:     Vital Signs (Most Recent):  Temp: 97.9 °F (36.6 °C) (02/02/22 1100)  Pulse: 97 (02/02/22 1500)  Resp: 20 (02/02/22 1500)  BP: (!) 90/0 (02/02/22 1500)  SpO2: 100 % (02/02/22 1500) Vital Signs (24h Range):  Temp:  [97.9 °F (36.6 °C)-98.7 °F (37.1 °C)] 97.9 °F (36.6 °C)  Pulse:  [] 97  Resp:  [10-36] 20  SpO2:  [98 %-100 %] 100 %  BP: ()/(0-102) 90/0  Arterial Line BP: ()/(64-98) 108/81     Patient Vitals for the past 72 hrs (Last 3 readings):   Weight   02/02/22 0300 82.4 kg (181 lb 10.5 oz)   02/01/22 0258 86.5 kg (190 lb 11.2 oz)     Body mass index is 23.32 kg/m².      Intake/Output Summary (Last 24 hours) at 2/2/2022 1542  Last data filed at 2/2/2022 1500  Gross per 24 hour   Intake 409.16 ml   Output 650 ml   Net -240.84 ml       Hemodynamic Parameters:        Telemetry: NSR     Physical Exam  Constitutional:       Appearance: Normal appearance.   HENT:      Head: Normocephalic.      Nose: Nose normal.   Neck:      Comments: JVP at about midneck at 45 degrees  Cardiovascular:      Rate and Rhythm: Normal  rate and regular rhythm.      Pulses: Normal pulses.      Heart sounds: Normal heart sounds.      Comments: VAD hum present  Pulmonary:      Effort: Pulmonary effort is normal.      Breath sounds: Normal breath sounds.   Abdominal:      General: Abdomen is flat. Bowel sounds are normal.      Palpations: Abdomen is soft.   Musculoskeletal:         General: Normal range of motion.      Cervical back: Normal range of motion.   Skin:     General: Skin is warm.      Capillary Refill: Capillary refill takes less than 2 seconds.   Neurological:      General: No focal deficit present.      Mental Status: He is alert and oriented to person, place, and time.         Significant Labs:  CBC:  Recent Labs   Lab 01/31/22 0450 02/01/22 0341 02/02/22 0312   WBC 6.64 9.60 11.04   RBC 3.22* 3.76* 3.88*   HGB 9.2* 10.6* 11.1*   HCT 31.0* 34.6* 34.9*   * 155 178   MCV 96 92 90   MCH 28.6 28.2 28.6   MCHC 29.7* 30.6* 31.8*     BNP:  Recent Labs   Lab 01/27/22 2055 01/31/22 0450 02/02/22 0312   * 586* 996*     CMP:  Recent Labs   Lab 01/27/22 2055 01/28/22  0816 01/31/22  0450 01/31/22  1118 02/01/22  0341 02/01/22  1149 02/02/22  0312 02/02/22  1155 02/02/22  1424   *   < > 131*  --  166*  --  149*  --   --    CALCIUM 9.0   < > 8.2*  --  8.1*  --  8.5*  --   --    ALBUMIN 2.6*  --  2.2*  --   --   --  2.5*  --   --    PROT 8.7*  --  7.1  --   --   --  7.7  --   --    *   < > 142   < > 136   < > 136 134* 133*   K 4.6   < > 4.1  --  4.2  --  4.2  --   --    CO2 22*   < > 22*  --  19*  --  18*  --   --       < > 112*  --  110  --  107  --   --    BUN 33*   < > 15  --  10  --  12  --   --    CREATININE 1.3   < > 0.8  --  0.7  --  0.8  --   --    ALKPHOS 120  --  102  --   --   --  116  --   --    ALT 16  --  11  --   --   --  6*  --   --    AST 38  --  24  --   --   --  17  --   --    BILITOT 1.6*  --  0.9  --   --   --  1.7*  --   --     < > = values in this interval not displayed.      Coagulation:    Recent Labs   Lab 01/31/22  0450 02/01/22  0341 02/02/22  0312   INR 1.5* 1.3* 1.2   APTT 30.4 31.9 28.5     LDH:  Recent Labs   Lab 01/31/22  0450 02/01/22  0341 02/02/22  0312   * 276* 281*     Microbiology:  Microbiology Results (last 7 days)     ** No results found for the last 168 hours. **          I have reviewed all pertinent labs within the past 24 hours.    Estimated Creatinine Clearance: 105.6 mL/min (based on SCr of 0.8 mg/dL).    Diagnostic Results:  I have reviewed and interpreted all pertinent imaging results/findings within the past 24 hours.

## 2022-02-02 NOTE — ASSESSMENT & PLAN NOTE
-S/P DT HM3 1/13/21  -Current speed 5000  -INR goal 2.0-3.0. Current INR 1.2. Coumadin and ASA remain on hold  -LDH stable    Procedure: Device Interrogation Including analysis of device parameters  Current Settings: Ventricular Assist Device  Review of device function is stable/unstable stable         TXP LVAD INTERROGATIONS 2/2/2022 2/2/2022 2/2/2022 2/2/2022 2/2/2022 2/2/2022 2/2/2022   Type HeartMate3 HeartMate3 HeartMate3 HeartMate3 HeartMate3 HeartMate3 HeartMate3   Flow 4 4 4.1 4 3.8 4.4 3.5   Speed 5000 5000 5000 5000 5000 5000 5000   PI 6 6.2 5.7 6.5 6.8 4.2 9.3   Power (Edwards) 3.4 3.5 3.5 3.5 3.4 3.4 3.4   LSL 4500 4500 4500 4500 4500 4500 4500   Pulsatility Pulse Pulse Pulse Pulse Pulse Pulse Pulse

## 2022-02-03 ENCOUNTER — DOCUMENTATION ONLY (OUTPATIENT)
Dept: CARDIOLOGY | Facility: HOSPITAL | Age: 67
End: 2022-02-03
Payer: MEDICARE

## 2022-02-03 ENCOUNTER — DOCUMENTATION ONLY (OUTPATIENT)
Dept: ELECTROPHYSIOLOGY | Facility: CLINIC | Age: 67
End: 2022-02-03
Payer: MEDICARE

## 2022-02-03 PROBLEM — I47.10 SVT (SUPRAVENTRICULAR TACHYCARDIA): Status: ACTIVE | Noted: 2022-02-03

## 2022-02-03 LAB
ALLENS TEST: ABNORMAL
ANION GAP SERPL CALC-SCNC: 4 MMOL/L (ref 8–16)
APTT BLDCRRT: 30.6 SEC (ref 21–32)
BASOPHILS # BLD AUTO: 0.01 K/UL (ref 0–0.2)
BASOPHILS NFR BLD: 0.1 % (ref 0–1.9)
BUN SERPL-MCNC: 14 MG/DL (ref 8–23)
CALCIUM SERPL-MCNC: 8.1 MG/DL (ref 8.7–10.5)
CHLORIDE SERPL-SCNC: 111 MMOL/L (ref 95–110)
CO2 SERPL-SCNC: 24 MMOL/L (ref 23–29)
CREAT SERPL-MCNC: 0.8 MG/DL (ref 0.5–1.4)
DELSYS: ABNORMAL
DIFFERENTIAL METHOD: ABNORMAL
EOSINOPHIL # BLD AUTO: 0.1 K/UL (ref 0–0.5)
EOSINOPHIL NFR BLD: 1.1 % (ref 0–8)
ERYTHROCYTE [DISTWIDTH] IN BLOOD BY AUTOMATED COUNT: 14.9 % (ref 11.5–14.5)
EST. GFR  (AFRICAN AMERICAN): >60 ML/MIN/1.73 M^2
EST. GFR  (NON AFRICAN AMERICAN): >60 ML/MIN/1.73 M^2
GLUCOSE SERPL-MCNC: 117 MG/DL (ref 70–110)
HCO3 UR-SCNC: 21.3 MMOL/L (ref 24–28)
HCT VFR BLD AUTO: 30.9 % (ref 40–54)
HGB BLD-MCNC: 9.6 G/DL (ref 14–18)
IMM GRANULOCYTES # BLD AUTO: 0.03 K/UL (ref 0–0.04)
IMM GRANULOCYTES NFR BLD AUTO: 0.3 % (ref 0–0.5)
INR PPP: 1.3 (ref 0.8–1.2)
LDH SERPL L TO P-CCNC: 203 U/L (ref 110–260)
LYMPHOCYTES # BLD AUTO: 1.3 K/UL (ref 1–4.8)
LYMPHOCYTES NFR BLD: 13.2 % (ref 18–48)
MAGNESIUM SERPL-MCNC: 1.8 MG/DL (ref 1.6–2.6)
MCH RBC QN AUTO: 28.4 PG (ref 27–31)
MCHC RBC AUTO-ENTMCNC: 31.1 G/DL (ref 32–36)
MCV RBC AUTO: 91 FL (ref 82–98)
MONOCYTES # BLD AUTO: 0.9 K/UL (ref 0.3–1)
MONOCYTES NFR BLD: 9.4 % (ref 4–15)
NEUTROPHILS # BLD AUTO: 7.5 K/UL (ref 1.8–7.7)
NEUTROPHILS NFR BLD: 75.9 % (ref 38–73)
NRBC BLD-RTO: 0 /100 WBC
PCO2 BLDA: 28.1 MMHG (ref 35–45)
PH SMN: 7.49 [PH] (ref 7.35–7.45)
PHOSPHATE SERPL-MCNC: 2.3 MG/DL (ref 2.7–4.5)
PLATELET # BLD AUTO: 174 K/UL (ref 150–450)
PMV BLD AUTO: 11.3 FL (ref 9.2–12.9)
PO2 BLDA: 117 MMHG (ref 80–100)
POC BE: -2 MMOL/L
POC SATURATED O2: 99 % (ref 95–100)
POC TCO2: 22 MMOL/L (ref 23–27)
POCT GLUCOSE: 103 MG/DL (ref 70–110)
POCT GLUCOSE: 107 MG/DL (ref 70–110)
POCT GLUCOSE: 131 MG/DL (ref 70–110)
POTASSIUM SERPL-SCNC: 3.8 MMOL/L (ref 3.5–5.1)
PROTHROMBIN TIME: 13.8 SEC (ref 9–12.5)
RBC # BLD AUTO: 3.38 M/UL (ref 4.6–6.2)
SAMPLE: ABNORMAL
SITE: ABNORMAL
SODIUM SERPL-SCNC: 137 MMOL/L (ref 136–145)
SODIUM SERPL-SCNC: 139 MMOL/L (ref 136–145)
SODIUM SERPL-SCNC: 140 MMOL/L (ref 136–145)
WBC # BLD AUTO: 9.9 K/UL (ref 3.9–12.7)

## 2022-02-03 PROCEDURE — 99233 SBSQ HOSP IP/OBS HIGH 50: CPT | Mod: ,,, | Performed by: INTERNAL MEDICINE

## 2022-02-03 PROCEDURE — 80048 BASIC METABOLIC PNL TOTAL CA: CPT | Performed by: HOSPITALIST

## 2022-02-03 PROCEDURE — 27000248 HC VAD-ADDITIONAL DAY

## 2022-02-03 PROCEDURE — 93010 EKG 12-LEAD: ICD-10-PCS | Mod: ,,, | Performed by: INTERNAL MEDICINE

## 2022-02-03 PROCEDURE — 27000207 HC ISOLATION

## 2022-02-03 PROCEDURE — 25000003 PHARM REV CODE 250: Performed by: PHYSICIAN ASSISTANT

## 2022-02-03 PROCEDURE — 25000003 PHARM REV CODE 250

## 2022-02-03 PROCEDURE — 93005 ELECTROCARDIOGRAM TRACING: CPT

## 2022-02-03 PROCEDURE — 85730 THROMBOPLASTIN TIME PARTIAL: CPT | Performed by: HOSPITALIST

## 2022-02-03 PROCEDURE — 99232 PR SUBSEQUENT HOSPITAL CARE,LEVL II: ICD-10-PCS | Mod: GC,,, | Performed by: PSYCHIATRY & NEUROLOGY

## 2022-02-03 PROCEDURE — 93750 PR INTERROGATE VENT ASSIST DEV, IN PERSON, W PHYSICIAN ANALYSIS: ICD-10-PCS | Mod: ,,, | Performed by: INTERNAL MEDICINE

## 2022-02-03 PROCEDURE — 97530 THERAPEUTIC ACTIVITIES: CPT

## 2022-02-03 PROCEDURE — 27200188 HC TRANSDUCER, ART ADULT/PEDS

## 2022-02-03 PROCEDURE — 83735 ASSAY OF MAGNESIUM: CPT | Performed by: HOSPITALIST

## 2022-02-03 PROCEDURE — 25000003 PHARM REV CODE 250: Performed by: PSYCHIATRY & NEUROLOGY

## 2022-02-03 PROCEDURE — 99233 PR SUBSEQUENT HOSPITAL CARE,LEVL III: ICD-10-PCS | Mod: ,,, | Performed by: INTERNAL MEDICINE

## 2022-02-03 PROCEDURE — 84100 ASSAY OF PHOSPHORUS: CPT | Performed by: HOSPITALIST

## 2022-02-03 PROCEDURE — 63600175 PHARM REV CODE 636 W HCPCS: Performed by: STUDENT IN AN ORGANIZED HEALTH CARE EDUCATION/TRAINING PROGRAM

## 2022-02-03 PROCEDURE — 20000000 HC ICU ROOM

## 2022-02-03 PROCEDURE — 83615 LACTATE (LD) (LDH) ENZYME: CPT | Performed by: HOSPITALIST

## 2022-02-03 PROCEDURE — 97535 SELF CARE MNGMENT TRAINING: CPT

## 2022-02-03 PROCEDURE — 97110 THERAPEUTIC EXERCISES: CPT

## 2022-02-03 PROCEDURE — 85025 COMPLETE CBC W/AUTO DIFF WBC: CPT | Performed by: HOSPITALIST

## 2022-02-03 PROCEDURE — 63600175 PHARM REV CODE 636 W HCPCS

## 2022-02-03 PROCEDURE — 25000003 PHARM REV CODE 250: Performed by: HOSPITALIST

## 2022-02-03 PROCEDURE — 99291 CRITICAL CARE FIRST HOUR: CPT | Mod: ,,, | Performed by: PHYSICIAN ASSISTANT

## 2022-02-03 PROCEDURE — 99900035 HC TECH TIME PER 15 MIN (STAT)

## 2022-02-03 PROCEDURE — 99291 PR CRITICAL CARE, E/M 30-74 MINUTES: ICD-10-PCS | Mod: ,,, | Performed by: PHYSICIAN ASSISTANT

## 2022-02-03 PROCEDURE — 84295 ASSAY OF SERUM SODIUM: CPT | Mod: 91 | Performed by: PSYCHIATRY & NEUROLOGY

## 2022-02-03 PROCEDURE — 93010 ELECTROCARDIOGRAM REPORT: CPT | Mod: ,,, | Performed by: INTERNAL MEDICINE

## 2022-02-03 PROCEDURE — 93750 INTERROGATION VAD IN PERSON: CPT | Mod: ,,, | Performed by: INTERNAL MEDICINE

## 2022-02-03 PROCEDURE — 85610 PROTHROMBIN TIME: CPT | Performed by: HOSPITALIST

## 2022-02-03 PROCEDURE — 99232 SBSQ HOSP IP/OBS MODERATE 35: CPT | Mod: GC,,, | Performed by: PSYCHIATRY & NEUROLOGY

## 2022-02-03 PROCEDURE — 94761 N-INVAS EAR/PLS OXIMETRY MLT: CPT

## 2022-02-03 PROCEDURE — 82803 BLOOD GASES ANY COMBINATION: CPT

## 2022-02-03 PROCEDURE — 37799 UNLISTED PX VASCULAR SURGERY: CPT

## 2022-02-03 RX ORDER — LANOLIN ALCOHOL/MO/W.PET/CERES
400 CREAM (GRAM) TOPICAL DAILY
Status: DISCONTINUED | OUTPATIENT
Start: 2022-02-03 | End: 2022-02-07

## 2022-02-03 RX ORDER — SODIUM CHLORIDE 9 MG/ML
INJECTION, SOLUTION INTRAVENOUS CONTINUOUS
Status: DISCONTINUED | OUTPATIENT
Start: 2022-02-03 | End: 2022-02-17 | Stop reason: HOSPADM

## 2022-02-03 RX ORDER — HYDRALAZINE HYDROCHLORIDE 20 MG/ML
10 INJECTION INTRAMUSCULAR; INTRAVENOUS ONCE
Status: COMPLETED | OUTPATIENT
Start: 2022-02-03 | End: 2022-02-03

## 2022-02-03 RX ADMIN — OXYCODONE HYDROCHLORIDE AND ACETAMINOPHEN 1 TABLET: 5; 325 TABLET ORAL at 05:02

## 2022-02-03 RX ADMIN — FUROSEMIDE 40 MG: 40 TABLET ORAL at 08:02

## 2022-02-03 RX ADMIN — LEVETIRACETAM 500 MG: 500 TABLET, FILM COATED ORAL at 08:02

## 2022-02-03 RX ADMIN — FAMOTIDINE 20 MG: 20 TABLET ORAL at 08:02

## 2022-02-03 RX ADMIN — MUPIROCIN: 20 OINTMENT TOPICAL at 08:02

## 2022-02-03 RX ADMIN — DOXAZOSIN 2 MG: 2 TABLET ORAL at 08:02

## 2022-02-03 RX ADMIN — LISINOPRIL 10 MG: 10 TABLET ORAL at 08:02

## 2022-02-03 RX ADMIN — Medication 400 MG: at 08:02

## 2022-02-03 RX ADMIN — CHLORHEXIDINE GLUCONATE 0.12% ORAL RINSE 15 ML: 1.2 LIQUID ORAL at 08:02

## 2022-02-03 RX ADMIN — SODIUM CHLORIDE 5 ML/HR: 0.9 INJECTION, SOLUTION INTRAVENOUS at 03:02

## 2022-02-03 RX ADMIN — FUROSEMIDE 40 MG: 40 TABLET ORAL at 06:02

## 2022-02-03 RX ADMIN — HYDRALAZINE HYDROCHLORIDE 10 MG: 20 INJECTION INTRAMUSCULAR; INTRAVENOUS at 10:02

## 2022-02-03 RX ADMIN — OXYCODONE HYDROCHLORIDE AND ACETAMINOPHEN 1 TABLET: 5; 325 TABLET ORAL at 03:02

## 2022-02-03 RX ADMIN — SODIUM CHLORIDE 30 ML/HR: 3 INJECTION, SOLUTION INTRAVENOUS at 03:02

## 2022-02-03 RX ADMIN — ATORVASTATIN CALCIUM 80 MG: 20 TABLET, FILM COATED ORAL at 08:02

## 2022-02-03 NOTE — PLAN OF CARE
Pt remains alert and oriented x4. Sleeping between care. Does have complaints of a constant headache 3/10. PRN medications given with good results. Neuro checks completed every hour per order, no neuro changes to patient. 3% NaCl continues at set rate for goal of Na >135. Room air. NSR on monitor with frequent multifocal ectopy. Meeting MAP goal of <90. No VAD alarms overnight. Voiding spontaneously per bedside urinal. Poor PO intake. Did not eat dinner. Pt does complain of frequent hiccups that wake him and make him nauseous.     Problem: Adult Inpatient Plan of Care  Goal: Plan of Care Review  Outcome: Ongoing, Progressing  Goal: Absence of Hospital-Acquired Illness or Injury  Outcome: Ongoing, Progressing  Goal: Optimal Comfort and Wellbeing  Outcome: Ongoing, Progressing     Problem: Adjustment to Device (Ventricular Assist Device)  Goal: Optimal Adjustment to Device  Outcome: Ongoing, Progressing     Problem: Skin Injury Risk Increased  Goal: Skin Health and Integrity  Outcome: Ongoing, Progressing     Problem: Cognitive Impairment (Stroke, Ischemic/Transient Ischemic Attack)  Goal: Optimal Cognitive Function  Outcome: Ongoing, Progressing

## 2022-02-03 NOTE — ASSESSMENT & PLAN NOTE
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 He was found to have runs of SVT this am.   Device interrogated by self and staff. There was no tachyarrhythmia noted at time of syncope. Monitoring zone would record sustained HR > 150. We reset parameter for tachy zone to record above 133 bpm. No therapy will be delivered before HR sustains >167, which is unchanged from prior.  Pt reports feeling asymptomatic despite lengthy run of SVT  Pt reportedly hemodynamically stable during SVT run as well with Vad in place  Recommend beta blocker as tolerated.  Patient can follow up with EP outpatient if he decides to follow with us.

## 2022-02-03 NOTE — PROGRESS NOTES
Inpatient device interrogation and/or reprogramming orders received; Interrogation to be done by Device Clinic RN due to pt's +COVID status.

## 2022-02-03 NOTE — PROGRESS NOTES
02/03/2022  Rafal Cullen    Current provider:  Mike Chauhan MD    Device interrogation:  TXP LVAD INTERROGATIONS 2/3/2022 2/3/2022 2/3/2022 2/3/2022 2/3/2022 2/3/2022 2/3/2022   Type HeartMate3 HeartMate3 HeartMate3 HeartMate3 HeartMate3 HeartMate3 HeartMate3   Flow 4.1 4.4 4.4 4.4 4.4 3.9 4   Speed 5000 5000 5000 5000 5000 5000 5000   PI 5.1 3.9 4.7 4.6 4.1 6.3 5.7   Power (Edwards) 3.3 3.4 3.4 3.3 3.4 3.5 3.4   LSL 4500 4500 4500 4500 4500 4500 4500   Pulsatility Intermittent pulse Intermittent pulse Intermittent pulse Intermittent pulse Intermittent pulse Intermittent pulse Intermittent pulse          Rounded on Rocco France to ensure all mechanical assist device settings (IABP or VAD) were appropriate and all parameters were within limits.  I was able to ensure all back up equipment was present, the staff had no issues, and the Perfusion Department daily rounding was complete.      For implantable VADs: Interrogation of Ventricular assist device was performed with analysis of device parameters and review of device function. I have personally reviewed the interrogation findings and agree with findings as stated.     In emergency, the nursing units have been notified to contact the perfusion department either by:  Calling [phone] from 630am to 4pm Mon thru Fri, utilizing the On-Call Finder functionality of Epic and searching for Perfusion, or by contacting the hospital  from 4pm to 630am and on weekends and asking to speak with the perfusionist on call.    12:14 PM

## 2022-02-03 NOTE — PLAN OF CARE
Care Plan    POC reviewed with Rocco France and family. Questions and concerns addressed. No acute events today. Some c/o HA relieved with percocet po prn, AAOx4, maew, neuro intact, Na 140, 3%nacl gtt d'cd per NCC, pt hr up into 150s intermittently throughout the day, noticeable drop in bp this evening with increased HR, EP consulted, Aicd interrogation done, Dr. Corona made aware,  lvad hm3 intact with 4 l/min flow, no alarms, Pt progressing toward goals. Will continue to monitor. See below and flowsheets for full assessment and VS info.       Neuro:  [place] Coma Scale  Best Eye Response: 4-->(E4) spontaneous  Best Motor Response: 6-->(M6) obeys commands  Best Verbal Response: 5-->(V5) oriented  [place] Coma Scale Score: 15  Assessment Qualifiers: patient not sedated/intubated  Pupil PERRLA: yes  24 hr Temp:  [97.5 °F (36.4 °C)-97.8 °F (36.6 °C)]      CV:  Rhythm: sinus tachycardia  DVT prophylaxis: VTE Required Core Measure: (TEDs) Compression stocking therapy initiated/maintained,(SCDs) Sequential compression device initiated/maintained    Resp:  O2 Device (Oxygen Therapy): room air  Vent Mode: Spont  Set Rate: 18 BPM  Oxygen Concentration (%): 3  Vt Set: 450 mL  PEEP/CPAP: 5 cmH20  Pressure Support: 10 cmH20    GI/:  GI prophylaxis: yes  Diet/Nutrition Received: low saturated fat/low cholesterol  Last Bowel Movement: 02/01/22      Intake/Output Summary (Last 24 hours) at 2/3/2022 1728  Last data filed at 2/3/2022 1700  Gross per 24 hour   Intake 1918.64 ml   Output 1125 ml   Net 793.64 ml       Labs/Accuchecks:  Recent Labs   Lab 02/03/22  0356   WBC 9.90   RBC 3.38*   HGB 9.6*   HCT 30.9*         Recent Labs   Lab 02/02/22  0312 02/02/22  1155 02/03/22  0356 02/03/22  0356 02/03/22  1247      < > 139   < > 140   K 4.2  --  3.8  --   --    CO2 18*  --  24  --   --      --  111*  --   --    BUN 12  --  14  --   --    CREATININE 0.8  --  0.8  --   --    ALKPHOS 116  --   --   --   --     ALT 6*  --   --   --   --    AST 17  --   --   --   --    BILITOT 1.7*  --   --   --   --     < > = values in this interval not displayed.      Recent Labs   Lab 02/03/22  0356   INR 1.3*   APTT 30.6    No results for input(s): CPK, CPKMB, TROPONINI, MB in the last 168 hours.    Electrolytes: Electrolytes replaced  Accuchecks: ACHS    Gtts/LDAs:   sodium chloride 0.9% 5 mL/hr at 02/03/22 1700       Lines/Drains/Airways       Arterial Line              Arterial Line 01/27/22 1947 Left Radial 6 days              Line                   VAD 01/13/21 1211 Left ventricular assist device HeartMate 3 386 days              Peripheral Intravenous Line                   Peripheral IV - Single Lumen 02/01/22 1406 22 G Posterior;Right Hand 2 days         Peripheral IV - Single Lumen 02/01/22 2000 20 G Posterior;Right Forearm 1 day                    Skin/Wounds  Bathing/Skin Care: bath, complete;dressed/undressed;linen changed (02/03/22 0515)  Wounds: No  Wound care consulted: No    Consults  Consults (From admission, onward)          Status Ordering Provider     Inpatient consult to Electrophysiology  Once        Provider:  (Not yet assigned)    Acknowledged MIC RASHID     Inpatient consult to Neuro Critical Care  Once        Provider:  (Not yet assigned)    Completed SHARYN CHAND     Inpatient consult to Neurosurgery  Once        Provider:  (Not yet assigned)    Completed SHARYN CHAND

## 2022-02-03 NOTE — SUBJECTIVE & OBJECTIVE
Interval History: Bps more controlled with addition of doxazosin yesterday. HR back in 150s this morning, unable to get adequate EKG 2/2 VAD artifact. Broke down to 100s minutes later, appearing sinus tachycardia. Patient asymptomatic. Will have device interrogated. Na has come up nicely with starting back on hypertonic saline yesterday. INR 1.3, holding AC.       Continuous Infusions:   sodium chloride 0.9% 5 mL/hr at 02/03/22 1100    sodium chloride 3% 30 mL/hr (02/03/22 1100)     Scheduled Meds:   atorvastatin  80 mg Oral Daily    chlorhexidine  15 mL Mouth/Throat BID    doxazosin  2 mg Oral BID    famotidine  20 mg Oral BID    furosemide  40 mg Oral BID    levETIRAcetam  500 mg Oral BID    lisinopriL  10 mg Oral BID    magnesium oxide  400 mg Oral Daily    mupirocin   Nasal BID     PRN Meds:sodium chloride, acetaminophen, dextrose 50%, glucagon (human recombinant), insulin aspart U-100, oxyCODONE-acetaminophen    Review of patient's allergies indicates:   Allergen Reactions    Pcn [penicillins] Hives     Objective:     Vital Signs (Most Recent):  Temp: 97.8 °F (36.6 °C) (02/03/22 1100)  Pulse: (!) 148 (02/03/22 1100)  Resp: 13 (02/03/22 1100)  BP: (!) 90/0 (02/03/22 0830)  SpO2: 100 % (02/03/22 1100) Vital Signs (24h Range):  Temp:  [97.5 °F (36.4 °C)-98 °F (36.7 °C)] 97.8 °F (36.6 °C)  Pulse:  [] 148  Resp:  [10-39] 13  SpO2:  [94 %-100 %] 100 %  BP: (72-90)/(0) 90/0  Arterial Line BP: ()/(61-85) 92/69     Patient Vitals for the past 72 hrs (Last 3 readings):   Weight   02/03/22 0545 82.6 kg (182 lb 1.6 oz)   02/02/22 0300 82.4 kg (181 lb 10.5 oz)   02/01/22 0258 86.5 kg (190 lb 11.2 oz)     Body mass index is 23.38 kg/m².      Intake/Output Summary (Last 24 hours) at 2/3/2022 1242  Last data filed at 2/3/2022 1100  Gross per 24 hour   Intake 1217.66 ml   Output 925 ml   Net 292.66 ml       Hemodynamic Parameters:        Telemetry: NSR     Physical Exam  Constitutional:       Appearance:  Normal appearance.   HENT:      Head: Normocephalic.      Nose: Nose normal.   Neck:      Comments: JVP at about midneck at 45 degrees  Cardiovascular:      Rate and Rhythm: Normal rate and regular rhythm.      Pulses: Normal pulses.      Heart sounds: Normal heart sounds.      Comments: VAD hum present  Pulmonary:      Effort: Pulmonary effort is normal.      Breath sounds: Normal breath sounds.   Abdominal:      General: Abdomen is flat. Bowel sounds are normal.      Palpations: Abdomen is soft.   Musculoskeletal:         General: Normal range of motion.      Cervical back: Normal range of motion.   Skin:     General: Skin is warm.      Capillary Refill: Capillary refill takes less than 2 seconds.   Neurological:      General: No focal deficit present.      Mental Status: He is alert and oriented to person, place, and time.         Significant Labs:  CBC:  Recent Labs   Lab 02/01/22 0341 02/02/22 0312 02/03/22  0356   WBC 9.60 11.04 9.90   RBC 3.76* 3.88* 3.38*   HGB 10.6* 11.1* 9.6*   HCT 34.6* 34.9* 30.9*    178 174   MCV 92 90 91   MCH 28.2 28.6 28.4   MCHC 30.6* 31.8* 31.1*     BNP:  Recent Labs   Lab 01/27/22 2055 01/31/22 0450 02/02/22 0312   * 586* 996*     CMP:  Recent Labs   Lab 01/27/22 2055 01/28/22  0816 01/31/22  0450 01/31/22  1118 02/01/22  0341 02/01/22  1149 02/02/22  0312 02/02/22  1155 02/02/22  1802 02/02/22  2324 02/03/22  0356   *   < > 131*  --  166*  --  149*  --   --   --  117*   CALCIUM 9.0   < > 8.2*  --  8.1*  --  8.5*  --   --   --  8.1*   ALBUMIN 2.6*  --  2.2*  --   --   --  2.5*  --   --   --   --    PROT 8.7*  --  7.1  --   --   --  7.7  --   --   --   --    *   < > 142   < > 136   < > 136   < > 136 135* 139   K 4.6   < > 4.1  --  4.2  --  4.2  --   --   --  3.8   CO2 22*   < > 22*  --  19*  --  18*  --   --   --  24      < > 112*  --  110  --  107  --   --   --  111*   BUN 33*   < > 15  --  10  --  12  --   --   --  14   CREATININE 1.3   < >  0.8  --  0.7  --  0.8  --   --   --  0.8   ALKPHOS 120  --  102  --   --   --  116  --   --   --   --    ALT 16  --  11  --   --   --  6*  --   --   --   --    AST 38  --  24  --   --   --  17  --   --   --   --    BILITOT 1.6*  --  0.9  --   --   --  1.7*  --   --   --   --     < > = values in this interval not displayed.      Coagulation:   Recent Labs   Lab 02/01/22 0341 02/02/22 0312 02/03/22  0356   INR 1.3* 1.2 1.3*   APTT 31.9 28.5 30.6     LDH:  Recent Labs   Lab 02/01/22 0341 02/02/22 0312 02/03/22  0356   * 281* 203     Microbiology:  Microbiology Results (last 7 days)     ** No results found for the last 168 hours. **          I have reviewed all pertinent labs within the past 24 hours.    Estimated Creatinine Clearance: 105.6 mL/min (based on SCr of 0.8 mg/dL).    Diagnostic Results:  I have reviewed and interpreted all pertinent imaging results/findings within the past 24 hours.

## 2022-02-03 NOTE — SUBJECTIVE & OBJECTIVE
Interval History: No acute events. TCD wnl, CTM    Medications:  Continuous Infusions:   sodium chloride 0.9% 5 mL/hr at 02/03/22 0800    sodium chloride 3% 30 mL/hr (02/03/22 0800)     Scheduled Meds:   atorvastatin  80 mg Oral Daily    chlorhexidine  15 mL Mouth/Throat BID    doxazosin  2 mg Oral BID    famotidine  20 mg Oral BID    furosemide  40 mg Oral BID    levETIRAcetam  500 mg Oral BID    lisinopriL  10 mg Oral BID    magnesium oxide  400 mg Oral Daily    mupirocin   Nasal BID     PRN Meds:sodium chloride, acetaminophen, dextrose 50%, glucagon (human recombinant), insulin aspart U-100, oxyCODONE-acetaminophen     Review of Systems    Objective:     Weight: 82.6 kg (182 lb 1.6 oz)  Body mass index is 23.38 kg/m².  Vital Signs (Most Recent):  Temp: 97.8 °F (36.6 °C) (02/03/22 0830)  Pulse: 105 (02/03/22 0830)  Resp: 20 (02/03/22 0830)  BP: (!) 90/0 (02/03/22 0830)  SpO2: 100 % (02/03/22 0830) Vital Signs (24h Range):  Temp:  [97.5 °F (36.4 °C)-98 °F (36.7 °C)] 97.8 °F (36.6 °C)  Pulse:  [] 105  Resp:  [10-39] 20  SpO2:  [94 %-100 %] 100 %  BP: ()/(0-87) 90/0  Arterial Line BP: ()/(61-90) 96/67     Date 02/03/22 0700 - 02/04/22 0659   Shift 6275-8626 4708-1208 0208-3495 24 Hour Total   INTAKE   I.V.(mL/kg) 70(0.8)   70(0.8)   Shift Total(mL/kg) 70(0.8)   70(0.8)   OUTPUT   Shift Total(mL/kg)       Weight (kg) 82.6 82.6 82.6 82.6              NG/OG Tube 01/27/22 2140 18 Fr. Center mouth (Active)   $ NG/OG Tube Placement Complete 01/27/22 2130   Placement Check placement verified by x-ray;placement verified by aspirate characteristics 01/27/22 2309   Tolerance no signs/symptoms of discomfort 01/27/22 2309   Securement secured to commercial device 01/27/22 2309   Clamp Status/Tolerance clamped 01/27/22 2309   Suction Setting/Drainage Method suction at the bedside 01/27/22 2309   Insertion Site Appearance no redness, warmth, tenderness, skin breakdown, drainage 01/27/22 2309  "  Flush/Irrigation flushed w/;water;no resistance met 01/27/22 2309            Urethral Catheter 01/27/22 1858 (Active)   $ Duarte Insertion Complete 01/27/22 1947   Site Assessment Clean;Intact 01/28/22 0300   Collection Container Urimeter 01/28/22 0300   Securement Method secured to top of thigh w/ adhesive device 01/28/22 0300   Catheter Care Performed no 01/28/22 0300   Reason for Continuing Urinary Catheterization Critically ill in ICU and requiring hourly monitoring of intake/output 01/28/22 0300   CAUTI Prevention Bundle StatLock in place w 1" slack;Intact seal between catheter & drainage tubing;Drainage bag/urimeter off the floor;Sheeting clip in use;No dependent loops or kinks;Drainage bag/urimeter not overfilled (<2/3 full);Drainage bag/urimeter below bladder 01/28/22 0300   Output (mL) 55 mL 01/28/22 1000       Physical Exam:    Constitutional: He appears well-nourished. No distress.     Eyes: Pupils are equal, round, and reactive to light. EOM are normal.     Cardiovascular: Normal rate and regular rhythm.     Abdominal: Soft.     Psych/Behavior: He is alert.     [id number]  Alert  Answers questions appropriately by nodding/shaking head  PERRL  EOMI  Face Symmetric  BUE 5/5  BLE 5/5  No drift      Significant Labs:  Recent Labs   Lab 02/01/22  2310 02/01/22  2310 02/02/22  0312 02/02/22  1155 02/02/22  1802 02/02/22  2324 02/03/22  0356   GLU  --   --  149*  --   --   --  117*      < > 136   < > 136 135* 139   K  --   --  4.2  --   --   --  3.8   CL  --   --  107  --   --   --  111*   CO2  --   --  18*  --   --   --  24   BUN  --   --  12  --   --   --  14   CREATININE  --   --  0.8  --   --   --  0.8   CALCIUM  --   --  8.5*  --   --   --  8.1*   MG 2.1  --  2.0  --   --   --  1.8    < > = values in this interval not displayed.     Recent Labs   Lab 02/02/22  0312 02/03/22  0356   WBC 11.04 9.90   HGB 11.1* 9.6*   HCT 34.9* 30.9*    174     Recent Labs   Lab 02/02/22 0312 02/03/22  0356   INR " 1.2 1.3*   APTT 28.5 30.6     Microbiology Results (last 7 days)     ** No results found for the last 168 hours. **        All pertinent labs from the last 24 hours have been reviewed.    Significant Diagnostics:  I have reviewed and interpreted all pertinent imaging results/findings within the past 24 hours.        Physical Exam  Constitutional:       General: He is not in acute distress.     Appearance: He is well-nourished.   Eyes:      Extraocular Movements: EOM normal.      Pupils: Pupils are equal, round, and reactive to light.   Cardiovascular:      Rate and Rhythm: Normal rate and regular rhythm.   Abdominal:      Palpations: Abdomen is soft.   Neurological:      Mental Status: He is alert.

## 2022-02-03 NOTE — ASSESSMENT & PLAN NOTE
-S/P DT HM3 1/13/21  -Current speed 5000  -INR goal 2.0-3.0. Current INR 1.3. Coumadin and ASA remain on hold  -LDH stable    Procedure: Device Interrogation Including analysis of device parameters  Current Settings: Ventricular Assist Device  Review of device function is stable/unstable stable         TXP LVAD INTERROGATIONS 2/3/2022 2/3/2022 2/3/2022 2/3/2022 2/3/2022 2/3/2022 2/3/2022   Type HeartMate3 HeartMate3 HeartMate3 HeartMate3 HeartMate3 HeartMate3 HeartMate3   Flow 4.1 4.4 4.4 4.4 4.4 3.9 4   Speed 5000 5000 5000 5000 5000 5000 5000   PI 5.1 3.9 4.7 4.6 4.1 6.3 5.7   Power (Edwards) 3.3 3.4 3.4 3.3 3.4 3.5 3.4   LSL 4500 4500 4500 4500 4500 4500 4500   Pulsatility Intermittent pulse Intermittent pulse Intermittent pulse Intermittent pulse Intermittent pulse Intermittent pulse Intermittent pulse

## 2022-02-03 NOTE — NURSING
Called overnight team about Na results. No changes made at this time. 3% NaCl solution continues at set rate.

## 2022-02-03 NOTE — PROGRESS NOTES
Patient seen and examined  Complaint of mild headache, no neck stiffness  No complaint of unilateral weakness or sensory loss  Exam reveals nl cognition, nl CNs and no motor asymmetry  Due to the inability to daily screen for vasospasm, would recommend a CTA head during the time of its peak occurrence  (day 7-9), if CTA head without vasospasm and daily neuro checks remain stable, his neuro ICU needs would end by this weekend

## 2022-02-03 NOTE — PROGRESS NOTES
[facility]  Neurosurgery  Progress Note    Subjective:     History of Present Illness: 66M PMHx cardiomyopathy, s/p LVAD on coumadin and CAD s/p stenting on ASA, presenting after fall, consulted to NSGY for SDH. Pt had syncopal event earlier today with prodrome of dizziness, fell, hit head, went to OSH for workup and was complaining of occipital H/A. At OSH, pt had acute neurologic decline, had to be intubated, and presents to [facility] intubated, sedated and with no motor activity. However, 2 hours after presentation to [facility], pt's neurologic status has improved to awake, alert, and following commands briskly with minor headache.       Post-Op Info:  * No surgery found *         Interval History: No acute events. TCD wnl, CTM    Medications:  Continuous Infusions:   sodium chloride 0.9% 5 mL/hr at 02/03/22 0800    sodium chloride 3% 30 mL/hr (02/03/22 0800)     Scheduled Meds:   atorvastatin  80 mg Oral Daily    chlorhexidine  15 mL Mouth/Throat BID    doxazosin  2 mg Oral BID    famotidine  20 mg Oral BID    furosemide  40 mg Oral BID    levETIRAcetam  500 mg Oral BID    lisinopriL  10 mg Oral BID    magnesium oxide  400 mg Oral Daily    mupirocin   Nasal BID     PRN Meds:sodium chloride, acetaminophen, dextrose 50%, glucagon (human recombinant), insulin aspart U-100, oxyCODONE-acetaminophen     Review of Systems    Objective:     Weight: 82.6 kg (182 lb 1.6 oz)  Body mass index is 23.38 kg/m².  Vital Signs (Most Recent):  Temp: 97.8 °F (36.6 °C) (02/03/22 0830)  Pulse: 105 (02/03/22 0830)  Resp: 20 (02/03/22 0830)  BP: (!) 90/0 (02/03/22 0830)  SpO2: 100 % (02/03/22 0830) Vital Signs (24h Range):  Temp:  [97.5 °F (36.4 °C)-98 °F (36.7 °C)] 97.8 °F (36.6 °C)  Pulse:  [] 105  Resp:  [10-39] 20  SpO2:  [94 %-100 %] 100 %  BP: ()/(0-87) 90/0  Arterial Line BP: ()/(61-90) 96/67     Date 02/03/22 0700 - 02/04/22 0659   Shift 4050-8842 6656-0185 8187-8015 24 Hour Total  "  INTAKE   I.V.(mL/kg) 70(0.8)   70(0.8)   Shift Total(mL/kg) 70(0.8)   70(0.8)   OUTPUT   Shift Total(mL/kg)       Weight (kg) 82.6 82.6 82.6 82.6              NG/OG Tube 01/27/22 2140 18 Fr. Center mouth (Active)   $ NG/OG Tube Placement Complete 01/27/22 2130   Placement Check placement verified by x-ray;placement verified by aspirate characteristics 01/27/22 2309   Tolerance no signs/symptoms of discomfort 01/27/22 2309   Securement secured to commercial device 01/27/22 2309   Clamp Status/Tolerance clamped 01/27/22 2309   Suction Setting/Drainage Method suction at the bedside 01/27/22 2309   Insertion Site Appearance no redness, warmth, tenderness, skin breakdown, drainage 01/27/22 2309   Flush/Irrigation flushed w/;water;no resistance met 01/27/22 2309            Urethral Catheter 01/27/22 1858 (Active)   $ Duarte Insertion Complete 01/27/22 1947   Site Assessment Clean;Intact 01/28/22 0300   Collection Container Urimeter 01/28/22 0300   Securement Method secured to top of thigh w/ adhesive device 01/28/22 0300   Catheter Care Performed no 01/28/22 0300   Reason for Continuing Urinary Catheterization Critically ill in ICU and requiring hourly monitoring of intake/output 01/28/22 0300   CAUTI Prevention Bundle StatLock in place w 1" slack;Intact seal between catheter & drainage tubing;Drainage bag/urimeter off the floor;Sheeting clip in use;No dependent loops or kinks;Drainage bag/urimeter not overfilled (<2/3 full);Drainage bag/urimeter below bladder 01/28/22 0300   Output (mL) 55 mL 01/28/22 1000       Physical Exam:    Constitutional: He appears well-nourished. No distress.     Eyes: Pupils are equal, round, and reactive to light. EOM are normal.     Cardiovascular: Normal rate and regular rhythm.     Abdominal: Soft.     Psych/Behavior: He is alert.     [id number]  Alert  Answers questions appropriately by nodding/shaking head  PERRL  EOMI  Face Symmetric  BUE 5/5  BLE 5/5  No drift      Significant " Labs:  Recent Labs   Lab 02/01/22  2310 02/01/22  2310 02/02/22  0312 02/02/22  1155 02/02/22  1802 02/02/22  2324 02/03/22  0356   GLU  --   --  149*  --   --   --  117*      < > 136   < > 136 135* 139   K  --   --  4.2  --   --   --  3.8   CL  --   --  107  --   --   --  111*   CO2  --   --  18*  --   --   --  24   BUN  --   --  12  --   --   --  14   CREATININE  --   --  0.8  --   --   --  0.8   CALCIUM  --   --  8.5*  --   --   --  8.1*   MG 2.1  --  2.0  --   --   --  1.8    < > = values in this interval not displayed.     Recent Labs   Lab 02/02/22 0312 02/03/22  0356   WBC 11.04 9.90   HGB 11.1* 9.6*   HCT 34.9* 30.9*    174     Recent Labs   Lab 02/02/22 0312 02/03/22  0356   INR 1.2 1.3*   APTT 28.5 30.6     Microbiology Results (last 7 days)     ** No results found for the last 168 hours. **        All pertinent labs from the last 24 hours have been reviewed.    Significant Diagnostics:  I have reviewed and interpreted all pertinent imaging results/findings within the past 24 hours.        Physical Exam  Constitutional:       General: He is not in acute distress.     Appearance: He is well-nourished.   Eyes:      Extraocular Movements: EOM normal.      Pupils: Pupils are equal, round, and reactive to light.   Cardiovascular:      Rate and Rhythm: Normal rate and regular rhythm.   Abdominal:      Palpations: Abdomen is soft.   Neurological:      Mental Status: He is alert.           Assessment/Plan:     Subarachnoid bleed  66M PMHx cardiomyopathy, s/p LVAD on coumadin and CAD s/p stenting on ASA, presenting after syncopal event and fall with hitting head, found to have bilateral SDH R>L on CT at OSH, rpt CT with new cisternal SAH, negative aneurysm or vascular malformation on CTA, likely nonaneurysmal perimesencephalic SAH      --recommend transcranial dopplers to monitor for vasospasm.   -- CICU primary  -- q1 hour vitals/neurochecks  -- SBP < 140  -- Na > 135  -- Keppra 500 BID  -- INR 1.6  >> 1.3   -- Hold ASA/coumadin: no need for reversal at this time  -- All imaging reviewed   -- CTH 1/27 OSH: bilat aucte on chronic SDH, R > L, no shift, no hydro, cisterns open.    -- CTH 1/27 rpt: new SAH in interpeduncular and quadrigeminal cisterns   -- CTA 1/27: negative for aneurysm or vascular malformation   -- CTH 2/1 interval improvement of SDH and interval clearance of cisternal SAH   -- No recommendation for angiogram at this time, SAH likely related to venous plexus bleed   --seizure and AEDs per NCC         Ollie Wyatt MD  Neurosurgery  [facility]

## 2022-02-03 NOTE — PROGRESS NOTES
[facility]  Heart Transplant  Progress Note    Patient Name: Rocco France  MRN: [id number]  Admission Date: 1/27/2022  Hospital Length of Stay: 7 days  Attending Physician: Mike Chauhan MD  Primary Care Provider: Teresa Mendoza NP  Principal Problem:Bilateral subdural hematomas    Subjective:     Interval History: Bps more controlled with addition of doxazosin yesterday. HR back in 150s this morning, unable to get adequate EKG 2/2 VAD artifact. Broke down to 100s minutes later, appearing sinus tachycardia. Patient asymptomatic. Will have device interrogated. Na has come up nicely with starting back on hypertonic saline yesterday. INR 1.3, holding AC.       Continuous Infusions:   sodium chloride 0.9% 5 mL/hr at 02/03/22 1100    sodium chloride 3% 30 mL/hr (02/03/22 1100)     Scheduled Meds:   atorvastatin  80 mg Oral Daily    chlorhexidine  15 mL Mouth/Throat BID    doxazosin  2 mg Oral BID    famotidine  20 mg Oral BID    furosemide  40 mg Oral BID    levETIRAcetam  500 mg Oral BID    lisinopriL  10 mg Oral BID    magnesium oxide  400 mg Oral Daily    mupirocin   Nasal BID     PRN Meds:sodium chloride, acetaminophen, dextrose 50%, glucagon (human recombinant), insulin aspart U-100, oxyCODONE-acetaminophen    Review of patient's allergies indicates:   Allergen Reactions    Pcn [penicillins] Hives     Objective:     Vital Signs (Most Recent):  Temp: 97.8 °F (36.6 °C) (02/03/22 1100)  Pulse: (!) 148 (02/03/22 1100)  Resp: 13 (02/03/22 1100)  BP: (!) 90/0 (02/03/22 0830)  SpO2: 100 % (02/03/22 1100) Vital Signs (24h Range):  Temp:  [97.5 °F (36.4 °C)-98 °F (36.7 °C)] 97.8 °F (36.6 °C)  Pulse:  [] 148  Resp:  [10-39] 13  SpO2:  [94 %-100 %] 100 %  BP: (72-90)/(0) 90/0  Arterial Line BP: ()/(61-85) 92/69     Patient Vitals for the past 72 hrs (Last 3 readings):   Weight   02/03/22 0545 82.6 kg (182 lb 1.6 oz)   02/02/22 0300 82.4 kg (181 lb 10.5 oz)   02/01/22 0258 86.5  kg (190 lb 11.2 oz)     Body mass index is 23.38 kg/m².      Intake/Output Summary (Last 24 hours) at 2/3/2022 1242  Last data filed at 2/3/2022 1100  Gross per 24 hour   Intake 1217.66 ml   Output 925 ml   Net 292.66 ml       Hemodynamic Parameters:        Telemetry: NSR     Physical Exam  Constitutional:       Appearance: Normal appearance.   HENT:      Head: Normocephalic.      Nose: Nose normal.   Neck:      Comments: JVP at about midneck at 45 degrees  Cardiovascular:      Rate and Rhythm: Normal rate and regular rhythm.      Pulses: Normal pulses.      Heart sounds: Normal heart sounds.      Comments: VAD hum present  Pulmonary:      Effort: Pulmonary effort is normal.      Breath sounds: Normal breath sounds.   Abdominal:      General: Abdomen is flat. Bowel sounds are normal.      Palpations: Abdomen is soft.   Musculoskeletal:         General: Normal range of motion.      Cervical back: Normal range of motion.   Skin:     General: Skin is warm.      Capillary Refill: Capillary refill takes less than 2 seconds.   Neurological:      General: No focal deficit present.      Mental Status: He is alert and oriented to person, place, and time.         Significant Labs:  CBC:  Recent Labs   Lab 02/01/22 0341 02/02/22 0312 02/03/22  0356   WBC 9.60 11.04 9.90   RBC 3.76* 3.88* 3.38*   HGB 10.6* 11.1* 9.6*   HCT 34.6* 34.9* 30.9*    178 174   MCV 92 90 91   MCH 28.2 28.6 28.4   MCHC 30.6* 31.8* 31.1*     BNP:  Recent Labs   Lab 01/27/22 2055 01/31/22  0450 02/02/22 0312   * 586* 996*     CMP:  Recent Labs   Lab 01/27/22 2055 01/28/22  0816 01/31/22  0450 01/31/22  1118 02/01/22  0341 02/01/22  1149 02/02/22  0312 02/02/22  1155 02/02/22  1802 02/02/22  2324 02/03/22  0356   *   < > 131*  --  166*  --  149*  --   --   --  117*   CALCIUM 9.0   < > 8.2*  --  8.1*  --  8.5*  --   --   --  8.1*   ALBUMIN 2.6*  --  2.2*  --   --   --  2.5*  --   --   --   --    PROT 8.7*  --  7.1  --   --   --   7.7  --   --   --   --    *   < > 142   < > 136   < > 136   < > 136 135* 139   K 4.6   < > 4.1  --  4.2  --  4.2  --   --   --  3.8   CO2 22*   < > 22*  --  19*  --  18*  --   --   --  24      < > 112*  --  110  --  107  --   --   --  111*   BUN 33*   < > 15  --  10  --  12  --   --   --  14   CREATININE 1.3   < > 0.8  --  0.7  --  0.8  --   --   --  0.8   ALKPHOS 120  --  102  --   --   --  116  --   --   --   --    ALT 16  --  11  --   --   --  6*  --   --   --   --    AST 38  --  24  --   --   --  17  --   --   --   --    BILITOT 1.6*  --  0.9  --   --   --  1.7*  --   --   --   --     < > = values in this interval not displayed.      Coagulation:   Recent Labs   Lab 02/01/22 0341 02/02/22 0312 02/03/22  0356   INR 1.3* 1.2 1.3*   APTT 31.9 28.5 30.6     LDH:  Recent Labs   Lab 02/01/22 0341 02/02/22 0312 02/03/22  0356   * 281* 203     Microbiology:  Microbiology Results (last 7 days)     ** No results found for the last 168 hours. **          I have reviewed all pertinent labs within the past 24 hours.    Estimated Creatinine Clearance: 105.6 mL/min (based on SCr of 0.8 mg/dL).    Diagnostic Results:  I have reviewed and interpreted all pertinent imaging results/findings within the past 24 hours.    Assessment and Plan:     65 yo BM with stage D CHF due to NICMP s/p HM3, s/p ICD, HTN, HLD, T2DM was transferred from outside hospital emergency room after he was found to have subdural hematoma/subarachnoid hemorrhage.  Patient presented to the ER after he was found to have a syncopal event.  Also patient had respiratory distress on arrival for which he was intubated.  Patient was transferred here for further evaluation.  On arrival patient is intubated and is on propofol.  Neurosurgery and Neuro Critical Care were immediately informed.  Neurosurgery recommended to repeat CT after reviewing the prior CT done in the ER in [place].  INR on arrival is 1.6.  He also had a low flow with a  increased PI around 4:00 p.m. this evening.  According to wife patient was complaining of headache for the last 2 weeks..  He went to post office this [place] and she does not know what exactly happened..  ICD was interrogated and did not show any VT/VF.       * Bilateral subdural hematomas  -Patient had a traumatic subdural, subarachnoid hemorrhage after fall on 1/27. Currently no focal deficits  -NCC and NSGY following. Plans for serial imaging pending  -INR 1.6 on admit, 1.3 this morning. Coumadin/ASA on hold. Per NS, would like to hold at least 7 days, preferably 14d if possible.  -Patient had elevated maps above 90 on arrival, but overnight required addition of low dose Levophed to keep MAP > 65. Off of levo since 1/29, reintroduced lisinopril at lower dose 1/31 for MAPs in 90s. Increased to 10mg BID but still with suboptimal bp control. Start doxazosin 2mg BID2/2 with better control.  -Goal Na+ > 135, slowly trickled down days after stopping hypertonic saline. Discussed with NCC, restarted hypertonic saline yesterday with nice rise.  -Keppra started as AED. EEG negative for seizures  -Transcranial dopplers done 1/31 without evidence of vasospasm  -CTH overnight with evolving subdural hematoma, resolved subarachnoid  -Continue q1h neurochecks    Acute respiratory failure requiring reintubation  -Intubated prior to transfer for respiratory distress  -Patient is currently on contact isolation for reported +ve Covid test at OSH prior to transfer. CXR unremarkable. Repeat COVID test positive  - Extubated 1/28 and on RA  - Tx'd with remdesivir which was since discontinued. Asymptomatic from covid perspective    LVAD (left ventricular assist device) present  -S/P DT HM3 1/13/21  -Current speed 5000  -INR goal 2.0-3.0. Current INR 1.3. Coumadin and ASA remain on hold  -LDH stable    Procedure: Device Interrogation Including analysis of device parameters  Current Settings: Ventricular Assist Device  Review of device  function is stable/unstable stable         TXP LVAD INTERROGATIONS 2/3/2022 2/3/2022 2/3/2022 2/3/2022 2/3/2022 2/3/2022 2/3/2022   Type HeartMate3 HeartMate3 HeartMate3 HeartMate3 HeartMate3 HeartMate3 HeartMate3   Flow 4.1 4.4 4.4 4.4 4.4 3.9 4   Speed 5000 5000 5000 5000 5000 5000 5000   PI 5.1 3.9 4.7 4.6 4.1 6.3 5.7   Power (Edwards) 3.3 3.4 3.4 3.3 3.4 3.5 3.4   LSL 4500 4500 4500 4500 4500 4500 4500   Pulsatility Intermittent pulse Intermittent pulse Intermittent pulse Intermittent pulse Intermittent pulse Intermittent pulse Intermittent pulse       Type 2 diabetes mellitus without complication  -SSI  ACHS     Essential hypertension  -Levophed off. MAP goal >65  -Reinitiated home hypertensives 1/31 with lisinopril at lower dose than PTA. Uptitrated to 10mg BID as suboptimal BP control. PTA dosing was 10mg qam and 20mg qhs.  -Has required hydralazine prn  -Added doxazosin 2mg BID for better bp control 2/2/21 and Bps have improved  Uninterrupted Critical Care/Counseling Time (not including procedures): 45 minutes      Valentina Jose PA-C  Heart Transplant  [facility]

## 2022-02-03 NOTE — NURSING
No changes made or orders added at this time. Will reassess need for replacements or adjustments with day team per Dr. Higuera.

## 2022-02-03 NOTE — CONSULTS
[facility]  Cardiac Electrophysiology  Consult Note    Admission Date: 1/27/2022  Code Status: Full Code   Attending Provider: Mike Chauhan MD  Consulting Provider: Moses Lomeli MD  Principal Problem:Bilateral subdural hematomas    Inpatient consult to Electrophysiology  Consult performed by: Moses Lomeli MD  Consult ordered by: Valentina Jose PA-C        Subjective:     Reason for consult: svt     HPI:   Mr. Rocco France is a 65 yo M with pmhx s/f stage D CHF due to NICMP s/p HM3, s/p ICD, HTN, HLD, T2DM who was brought to OSH  by EMS [place]. Patient does not remember his fall. After being brought to the hospital, he was found to have SDH and SAH. He was subseqeuntly transferred to [facility] for further management. NeuroSg and Vascular Neurology on board as well. Patient reports doing well at this time. He was found to go from sinus tach (110s) to SVT with rate 150s this am. Telemetry reviewed, and his ICD was interrogated. Telemetry shows sharp onset tachycardia with rapid increase in HR. EGMs showing very narrow AV time delay, and they actually appear to be concurrent. Of note, patient stating that he was asymptomatic during this morning's episodes despite it lasting almost a full hour. Per RN, patient's blood pressure did not drop much with the increase in HR. Pt does admit to me that he get intermittently dizzy for short durations prior to this admission, but he has not felt that today.      Past Medical History:   Diagnosis Date    CLARISSE (acute kidney injury) 1/11/2021    CHF (congestive heart failure)     Chronic combined systolic and diastolic congestive heart failure 1/9/2021    Coronary artery disease     Diabetes mellitus     Hypertension     Kidney stones        Past Surgical History:   Procedure Laterality Date    CARDIAC CATHETERIZATION  2010    no stents    CARDIAC DEFIBRILLATOR PLACEMENT  2010    CARDIAC DEFIBRILLATOR PLACEMENT      HERNIA  REPAIR      IRRIGATION OF MEDIASTINUM N/A 1/14/2021    Procedure: IRRIGATION, MEDIASTINUM;  Surgeon: Zenon Corona MD;  Location: [facility] OR [facility] FLR;  Service: Cardiovascular;  Laterality: N/A;    KNEE ARTHROSCOPY Right     LEFT VENTRICULAR ASSIST DEVICE Left 1/13/2021    Procedure: INSERTION- HeartMate 3 LEFT VENTRICULAR ASSIST DEVICE ;  Surgeon: Zenon Corona MD;  Location: [facility] OR [facility] FLR;  Service: Cardiovascular;  Laterality: Left;    RECONSTRUCTION OF PERICARDIUM N/A 1/14/2021    Procedure: RECONSTRUCTION, PERICARDIUM;  Surgeon: Zenon Corona MD;  Location: [facility] OR [facility] FLR;  Service: Cardiovascular;  Laterality: N/A;    RIGHT HEART CATHETERIZATION Right 11/2/2020    Procedure: INSERTION, CATHETER, RIGHT HEART;  Surgeon: Deana Lake MD;  Location: [facility] CATH LAB;  Service: Cardiology;  Laterality: Right;    STERNAL WOUND CLOSURE  1/13/2021    Procedure: TEMPORARY CLOSURE OF CHEST;  Surgeon: Zenon Corona MD;  Location: [facility] OR [facility] FLR;  Service: Cardiovascular;;    STERNAL WOUND CLOSURE N/A 1/14/2021    Procedure: CLOSURE, WOUND, STERNUM;  Surgeon: Zenon Corona MD;  Location: [facility] OR [facility] FLR;  Service: Cardiovascular;  Laterality: N/A;       Review of patient's allergies indicates:   Allergen Reactions    Pcn [penicillins] Hives       No current facility-administered medications on file prior to encounter.     Current Outpatient Medications on File Prior to Encounter   Medication Sig    aspirin 325 MG tablet Take 1 tablet (325 mg total) by mouth once daily.    atorvastatin (LIPITOR) 80 MG tablet Take 1 tablet (80 mg total) by mouth once daily.    docusate sodium (COLACE) 100 MG capsule Take 100 mg by mouth Daily.    lisinopriL (PRINIVIL,ZESTRIL) 20 MG tablet Take 1/2 tablet (10 mg total) by mouth every morning and take 1 tablet (20 mg total) every evening.    magnesium oxide (MAG-OX) 400 mg (241.3 mg magnesium) tablet Take 1 tablet (400 mg total) by mouth 3 (three) times daily.     polyethylene glycol (GLYCOLAX) 17 gram PwPk Mix 1 packet (17 g) with liquid and take by mouth daily as needed.    SITagliptin (JANUVIA) 100 MG Tab Take 1 tablet (100 mg total) by mouth once daily.    torsemide (DEMADEX) 20 MG Tab Take 2 tablets (40 mg total) by mouth once daily.    warfarin (COUMADIN) 5 MG tablet Take a half tablet (2.5mg) by mouth on 2/4, then take a full tablet (5mg) by mouth daily thereafter.     Family History     Problem Relation (Age of Onset)    Heart attack Mother, Brother    Heart failure Brother    Hypertension Brother        Tobacco Use    Smoking status: Current Some Day Smoker     Types: Cigarettes    Smokeless tobacco: Never Used   Substance and Sexual Activity    Alcohol use: No    Drug use: Not on file    Sexual activity: Not on file     Review of Systems   Constitutional: Negative for chills, decreased appetite and fever.   HENT: Negative for congestion and sore throat.    Eyes: Negative for blurred vision and discharge.   Cardiovascular: Negative for chest pain, claudication, cyanosis, dyspnea on exertion and leg swelling.   Respiratory: Negative for cough, hemoptysis and shortness of breath.    Endocrine: Negative for cold intolerance and heat intolerance.   Skin: Negative for color change.   Musculoskeletal: Negative for muscle weakness and myalgias.   Gastrointestinal: Negative for bloating and abdominal pain.   Neurological: Negative for dizziness, focal weakness and weakness.   Psychiatric/Behavioral: Negative for altered mental status and depression.     Objective:     Vital Signs (Most Recent):  Temp: 97.8 °F (36.6 °C) (02/03/22 1500)  Pulse: 99 (02/03/22 1500)  Resp: 20 (02/03/22 1544)  BP: (!) 80/0 (02/03/22 1200)  SpO2: 100 % (02/03/22 1500) Vital Signs (24h Range):  Temp:  [97.5 °F (36.4 °C)-97.8 °F (36.6 °C)] 97.8 °F (36.6 °C)  Pulse:  [] 99  Resp:  [10-39] 20  SpO2:  [92 %-100 %] 100 %  BP: (72-90)/(0) 80/0  Arterial Line BP: ()/(60-78) 90/63        Weight: 82.6 kg (182 lb 1.6 oz)  Body mass index is 23.38 kg/m².    SpO2: 100 %  O2 Device (Oxygen Therapy): room air    Physical Exam  Constitutional:       Appearance: He is well-developed and well-nourished.   HENT:      Head: Normocephalic and atraumatic.      Right Ear: External ear normal.      Left Ear: External ear normal.   Eyes:      Extraocular Movements: EOM normal.      Conjunctiva/sclera: Conjunctivae normal.   Cardiovascular:      Rate and Rhythm: Tachycardia present.      Pulses: Intact distal pulses.           Radial pulses are 2+ on the right side and 2+ on the left side.      Comments: +vad Humm  Pulmonary:      Effort: Pulmonary effort is normal. No respiratory distress.      Breath sounds: Normal breath sounds. No wheezing.   Abdominal:      General: Bowel sounds are normal. There is no distension.      Palpations: Abdomen is soft.      Tenderness: There is no abdominal tenderness.   Musculoskeletal:         General: No edema. Normal range of motion.      Cervical back: Normal range of motion and neck supple.   Skin:     General: Skin is warm and dry.   Neurological:      Mental Status: He is alert and oriented to person, place, and time.         Significant Labs:   CMP:   Recent Labs   Lab 02/02/22  0312 02/02/22  1155 02/02/22  2324 02/03/22  0356 02/03/22  1247      < > 135* 139 140   K 4.2  --   --  3.8  --      --   --  111*  --    CO2 18*  --   --  24  --    *  --   --  117*  --    BUN 12  --   --  14  --    CREATININE 0.8  --   --  0.8  --    CALCIUM 8.5*  --   --  8.1*  --    PROT 7.7  --   --   --   --    ALBUMIN 2.5*  --   --   --   --    BILITOT 1.7*  --   --   --   --    ALKPHOS 116  --   --   --   --    AST 17  --   --   --   --    ALT 6*  --   --   --   --    ANIONGAP 11  --   --  4*  --    ESTGFRAFRICA >60.0  --   --  >60.0  --    EGFRNONAA >60.0  --   --  >60.0  --     < > = values in this interval not displayed.   , CBC:   Recent Labs   Lab  02/02/22  0312 02/02/22  0312 02/03/22  0356   WBC 11.04  --  9.90   HGB 11.1*  --  9.6*   HCT 34.9*   < > 30.9*     --  174    < > = values in this interval not displayed.    and INR:   Recent Labs   Lab 02/02/22 0312 02/03/22  0356   INR 1.2 1.3*       Significant Imaging: Echocardiogram:   2D echo with color flow doppler:   Results for orders placed or performed during the hospital encounter of 09/25/15   2D echo with color flow doppler   Result Value Ref Range    EF + QEF 25 (A) 55 - 65    Mitral Valve Regurgitation MILD     Diastolic Dysfunction No     Est. PA Systolic Pressure 30.04     Mitral Valve Mobility NORMAL     Tricuspid Valve Regurgitation TRIVIAL     Narrative    TEST DESCRIPTION   Technical Quality: This is a technically adequate study.     General: A catheter is present in the right-sided cardiac chambers.     Aorta: The aortic root is normal in size, measuring 3.2 cm at sinotubular junction and 3.5 cm at Sinuses of Valsalva. The proximal ascending aorta is mildly enlarged, measuring 3.8 cm across.     Left Atrium: The left atrial volume index is mildly enlarged, measuring 38.98 cc/m2.     Left Ventricle: The left ventricle is enlarged, with an end-diastolic diameter of 5.9 cm, and an end-systolic diameter of 5.1 cm. LV wall thickness is normal, with the septum measuring 0.7 cm and the posterior wall measuring 0.8 cm across. Relative wall   thickness was normal at 0.27, and the LV mass index was 88.7 g/m2 consistent with normal left ventricular mass. The inferior wall is akinetic. The following segments were akinetic: mid inferolateral wall, basal anterolateral wall.  The following segments were severely hypokinetic: mid inferoseptum, basal inferoseptum.  Global left ventricular systolic function appears severely depressed. Visually estimated ejection fraction is 25-30%. Global longitudinal speckle-derived LV 2D strain is -10%. The LV Doppler derived stroke volume equals 63.0 ccs. There is  global   hypokinesis.   There is normal systolic/diastolic flow in the pulmonary vein indicating normal left atrial pressures. The E/e'(lat) is 5, consistent with normal diastolic function.     Right Atrium: The right atrium is enlarged, measuring 4.7 cm in length and 4.6 cm in width in the apical view.     Right Ventricle: The right ventricle is normal in size measuring 3.4 cm at the base in the apical right ventricle-focused view. Global right ventricular systolic function appears normal. There is abnormal septal motion. Tricuspid annular plane systolic   excursion (TAPSE) is 2.3 cm. Tissue Doppler-derived tricuspid annular peak systolic velocity (S prime) is 14.0 cm/s. The estimated PA systolic pressure is 30 mmHg.     Aortic Valve:  The aortic valve is mildly sclerotic. The aortic valve is tri-leaflet in structure.     Mitral Valve:  The mitral valve is normal in structure with normal leaflet mobility. There is mild mitral regurgitation.     Tricuspid Valve:  The tricuspid valve is normal in structure with normal leaflet mobility. There is trivial tricuspid regurgitation.     Pulmonary Valve:  The pulmonic valve is normal in structure with normal leaflet mobility. There is mild pulmonic regurgitation.     IVC: IVC is normal in size and collapses > 50% with a sniff, suggesting normal right atrial pressure of 3 mmHg.     Intracavitary: There is no evidence of pericardial effusion, intracavity mass, thrombi, or vegetation.     Other: There is a right pleural effusion present.         CONCLUSIONS     1 - Severely depressed left ventricular systolic function (EF 25-30%).     2 - Biatrial enlargement.     3 - Mildly enlarged ascending aorta.     4 - The estimated PA systolic pressure is 30 mmHg.     5 - Mild mitral regurgitation.     6 - Trivial tricuspid regurgitation.     7 - Right pleural effusion.     8 - Normal right ventricular systolic function .         This document has been electronically    SIGNED BY:  Darlyn Quezada MD On: 09/25/2015 15:28    and Transthoracic echo (TTE) complete (Cupid Only):   Results for orders placed or performed during the hospital encounter of 09/22/21   Echo   Result Value Ref Range    BSA 2.08 m2    TDI SEPTAL 0.06 m/s    LV LATERAL E/E' RATIO 6.64 m/s    LV SEPTAL E/E' RATIO 12.17 m/s    LA WIDTH 5.48 cm    TDI LATERAL 0.11 m/s    LVIDd 6.66 (A) 3.5 - 6.0 cm    IVS 0.58 (A) 0.6 - 1.1 cm    Posterior Wall 0.79 0.6 - 1.1 cm    LVIDs 6.00 (A) 2.1 - 4.0 cm    FS 10 28 - 44 %    LA volume 81.12 cm3    Sinus 3.95 cm    STJ 3.50 cm    Ascending aorta 4.00 cm    LV mass 185.61 g    LA size 3.68 cm    RVDD 5.24 cm    TAPSE 1.12 cm    RV S' 7.37 cm/s    Left Ventricle Relative Wall Thickness 0.24 cm    MV valve area p 1/2 method 4.13 cm2    E/A ratio 2.35     Mean e' 0.09 m/s    E wave deceleration time 183.49 msec    LVOT diameter 2.56 cm    LVOT area 5.1 cm2    E/E' ratio 8.59 m/s    MV Peak E Dimitris 0.73 m/s    TR Max Dimitris 2.65 m/s    MV stenosis pressure 1/2 time 53.21 ms    MV Peak A Dimitris 0.31 m/s    LV Systolic Volume 180.31 mL    LV Systolic Volume Index 86.3 mL/m2    LV Diastolic Volume 227.89 mL    LV Diastolic Volume Index 109.04 mL/m2    LA Volume Index 38.8 mL/m2    LV Mass Index 89 g/m2    RA Major Axis 4.91 cm    Left Atrium Minor Axis 4.85 cm    Left Atrium Major Axis 4.62 cm    Triscuspid Valve Regurgitation Peak Gradient 28 mmHg    LA Volume Index (Mod) 49.7 mL/m2    LA volume (mod) 103.92 cm3    RA Width 4.83 cm    Right Atrial Pressure (from IVC) 8 mmHg    EF 25 %    TV rest pulmonary artery pressure 36 mmHg    Narrative    · There is an LVAD present. Base speed is 5000 RPMs. The pump type is a   Heartmate III. The interventricular septum appears midline. The aortic   valve opens intermittently.  · The left ventricle is moderately enlarged with severely decreased   systolic function. The estimated ejection fraction is 25%.  · There is left ventricular global hypokinesis.  ·  Moderate right ventricular enlargement with mildly reduced right   ventricular systolic function.  · Grade III left ventricular diastolic dysfunction.  · Mild biatrial enlargement.  · Mild mitral regurgitation.  · The estimated PA systolic pressure is 36 mmHg.  · Intermediate central venous pressure (8 mmHg).  · The sinuses of Valsalva is mildly dilated.  · The ascending aorta is mildly dilated.                  Assessment and Plan:     SVT (supraventricular tachycardia)  67yo M with pmhx s/f stage D CHF due to NICMP s/p HM3, s/p ICD, HTN, HLD, T2DM presenting after a syncopal episode and found to have a traumatic SDH and SAH, currently on HTS service and followed by NSg and VNeuro. He was found to have runs of SVT this am.   Device interrogated by self and staff. There was no tachyarrhythmia noted at time of syncope. Monitoring zone would record sustained HR > 150. We reset parameter for tachy zone to record above 133 bpm. No therapy will be delivered before HR sustains >167, which is unchanged from prior.  Pt reports feeling asymptomatic despite lengthy run of SVT  Pt reportedly hemodynamically stable during SVT run as well with Vad in place  Recommend beta blocker as tolerated.  Patient can follow up with EP outpatient if he decides to follow with us.             Thank you for your consult. I will sign off. Please contact us if you have any additional questions.    Moses Lomeli MD  Cardiac Electrophysiology  [facility]

## 2022-02-03 NOTE — PT/OT/SLP PROGRESS
Physical Therapy Co-Treatment    Patient Name:  Rocco France   MRN:  [id number]  Admitting Diagnosis:  Bilateral subdural hematomas   Recent Surgery: * No surgery found *    Admit Date: 1/27/2022  Length of Stay: 7 days    Recommendations:     Discharge Recommendations:  rehabilitation facility   Discharge Equipment Recommendations: none   Barriers to discharge: trending medical condition, weakness, impaired activity tolerance, impaired balance, increased fall risk    Assessment:     Rocco France is a 66 y.o. male admitted with a medical diagnosis of Bilateral subdural hematomas.  He presents with the following impairments/functional limitations:  weakness,impaired endurance,impaired self care skills,impaired functional mobilty,gait instability,impaired balance,decreased upper extremity function,decreased lower extremity function,decreased safety awareness,impaired cardiopulmonary response to activity,impaired fine motor,impaired coordination. Pt tolerated session well today. Pt with good progress towards goals on this date - as seen by decreased assist needed for sit <> stand transfers as well as increased distance ambulated as compared to previous session. Pt with noted increased trunk sway and ant/post instability in static standing on this date with concern for posterior LOB requiring CGA for pt safety. Pt declining further mobility on this date due to nausea with episode of hiccups noted.    Pt is an excellent candidate for inpatient rehabilitation, as pt has a qualifying diagnosis, displays good activity tolerance, has experienced decline from PLOF, has good family support, and is motivated to participate in therapy. Pt's clinical condition meets full inpatient rehab criteria. Inpatient rehab will provide to total interdisciplinary treatment approach needed. Pt is at high risk of unplanned readmission due to fall risk. The lower level of care cannot provide total interdisciplinary approach needed.     Pt  will continue to benefit from skilled PT services during this hospital admit to continue to improve transfer ability and efficiency as well as continue to progress pt's ambulation distance and cardiopulmonary endurance to maximize pt's functional independence and return to PLOF.      Rehab Prognosis: Good; patient would benefit from acute skilled PT services to address these deficits and reach maximum level of function.    Recent Surgery: * No surgery found *      Plan:     During this hospitalization, patient to be seen 4 x/week to address the identified rehab impairments via gait training,therapeutic activities,therapeutic exercises,neuromuscular re-education and progress toward the following goals:    · Plan of Care Expires:  03/03/22    Subjective     RN notified prior to session. No family/friends present upon PT entrance into room.    Chief Complaint: Pt agreeable to participate in PT session  Patient/Family Comments/goals: get stronger  Pain/Comfort:  Pain Rating 1: 3/10  Location - Side 1: Bilateral  Location - Orientation 1: generalized  Location 1: head  Pain Addressed 1: Reposition,Distraction,Nurse notified  Pain Rating Post-Intervention 1: 3/10      Objective:     Additional staff present: OT for cotx due to pt's multiple medical comorbidities and functional deficits req'ing two skilled therapists to appropriately progress pt's musculoskeletal strength, neuromuscular control, and endurance while taking into consideration severe medical acuity in the ICU    Patient found HOB elevated with: telemetry,blood pressure cuff,pulse ox (continuous),LVAD,peripheral IV   Cognition:   · Alert and Cooperative  · AxOx4  · Command following: Follows multistep verbal commands  · Fluency: clear/fluent  General Precautions: Standard, Cardiac airborne,aspiration,contact,droplet,fall,LVAD   Orthopedic Precautions:N/A   Braces: N/A   Body mass index is 23.38 kg/m².  Oxygen Device: Room Air  Vitals: BP (!) 80/0 (BP Location:  "Left arm, Patient Position: Lying)   Pulse 99   Temp 97.8 °F (36.6 °C) (Oral)   Resp 20   Ht 6' 2" (1.88 m)   Wt 82.6 kg (182 lb 1.6 oz)   SpO2 100%   BMI 23.38 kg/m²     Outcome Measures:  AM-PAC 6 CLICK MOBILITY  Turning over in bed (including adjusting bedclothes, sheets and blankets)?: 3  Sitting down on and standing up from a chair with arms (e.g., wheelchair, bedside commode, etc.): 3  Moving from lying on back to sitting on the side of the bed?: 3  Moving to and from a bed to a chair (including a wheelchair)?: 3  Need to walk in hospital room?: 3  Climbing 3-5 steps with a railing?: 2  Basic Mobility Total Score: 17     Functional Mobility:    Bed Mobility:   · Supine to Sit: stand by assistance; from Rt side of bed  · Scooting anteriorly to EOB to have both feet planted on floor: stand by assistance    Sitting Balance at Edge of Bed:   Static Sitting Balance: Good : able to maintain balance against moderate resistance   Dynamic Sitting Balance: Good- : able to sit unsupported and weight shift across midline minimally   Assistance Level Required: Stand-by Assistance   Time: 8 minutes   Postural deviations noted: slouched posture, rounded shoulders and forward head   Comments: Time EOB focused primarily on tolerance to upright positioning, cardiopulmonary response and endurance for activities, and strength of postural musculature to perform dynamic sitting to prepare for tasks in the home. Pt able to accept internal perturbations to balance while seated EOB with appropriate trunk response to remain upright with no LOB and no UE support. Able to perform reaches inside TAMI and VSS throughout with no increase in HA.       Transfers:   · Sit <> Stand Transfer: stand by assistance with no assistive device   · Stand <> Sit Transfer: stand by assistance with no assistive device   · [id number] from EOB  · Bed <> Chair Transfer: Step Transfer technique with contact guard assistance with no assistive " device  · Chair on patient's Lt    Standing Balance:   Static Standing Balance: Good- : able to maintain standing balance against minimal resistance   Dynamic Standing Balance: Fair : stand independently unsupported, weight shift, and reach ipsilaterally. LOB noted when crossing midline.   Assistance Level Required: Contact Guard Assistance   Patient used: no assistive device    Comments: Pt with intermittent use of IV pole to maintain balance. Increased ant/post sway noted in static standing with eyes open but no LOB. Required CGA for pt safety due to concern for posterior LOB.      Gait:  · Patient ambulated:6 ft to bedside chair  · Patient required: contact guard  · Patient used:  hand-held assist   · Gait Pattern observed: reciprocal gait  · Gait Deviation(s): decreased step length, wide base of support, shuffle gait, flexed posture and decreased yayo  · Impairments due to: impaired balance, decreased strength and decreased endurance  · all lines remained intact throughout ambulation trial  · Comments: Pt used IV pole for balance on this date to widen TAMI and increase sensory input with steps. Pt with increased trunk sway noted in steps with generalized, multidirectional instability noted requiring CGA for pt safety.    Education:   Time provided for education, counseling and discussion of health disposition in regards to patient's current status   All questions answered within PT scope of practice and to patient's satisfaction   PT role in POC to address current functional deficits   Pt educated on proper body mechanics, safety techniques, and energy conservation with PT facilitation and cueing throughout session   Call nursing/pct to transfer to chair/use bathroom. Pt stated understanding.   Whiteboard updated with therapist name and pt's current mobility status documented above   Safe to perform stand pivot transfer to/from chair/bedside commode CGA x 2 persons for line mgmt and no AD w/  nursing/PCT present   Importance of OOB tolerance 3-4 hrs/day to improve lung ventilation and expansion as well as strengthen postural musculature   LVAD: patient found on wall power / returned on wall power. Ni alarms sounded.     Patient left up in chair with all lines intact, call button in reach, RN notified and rN present.    GOALS:   Multidisciplinary Problems     Physical Therapy Goals        Problem: Physical Therapy Goal    Goal Priority Disciplines Outcome Goal Variances Interventions   Physical Therapy Goal     PT, PT/OT Ongoing, Progressing     Description: Goals to be met by: 2022      Patient will increase functional independence with mobility by performin. Supine to sit with Modified [place]  2. Sit to stand transfer with Modified [place]  3. Bed to chair transfer with Modified [place] using the least restrictive device.   4. Gait  x 150 feet with Modified [place] using the least restrictive device.                     Time Tracking:     PT Received On: 22  PT Start Time: 1411     PT Stop Time: 1439  PT Total Time (min): 28 min     Billable Minutes:   · Therapeutic Activity 14 minutes and Therapeutic Exercise 14 minutes    Treatment Type: Treatment  PT/PTA: PT       Rosa Sevilla PT, DPT  2/3/2022  Pager: [phone]

## 2022-02-03 NOTE — ASSESSMENT & PLAN NOTE
-Levophed off. MAP goal >65  -Reinitiated home hypertensives 1/31 with lisinopril at lower dose than PTA. Uptitrated to 10mg BID as suboptimal BP control. PTA dosing was 10mg qam and 20mg qhs.  -Has required hydralazine prn  -Added doxazosin 2mg BID for better bp control 2/2/21 and Bps have improved

## 2022-02-03 NOTE — SUBJECTIVE & OBJECTIVE
Past Medical History:   Diagnosis Date    CLARISSE (acute kidney injury) 1/11/2021    CHF (congestive heart failure)     Chronic combined systolic and diastolic congestive heart failure 1/9/2021    Coronary artery disease     Diabetes mellitus     Hypertension     Kidney stones        Past Surgical History:   Procedure Laterality Date    CARDIAC CATHETERIZATION  2010    no stents    CARDIAC DEFIBRILLATOR PLACEMENT  2010    CARDIAC DEFIBRILLATOR PLACEMENT      HERNIA REPAIR      IRRIGATION OF MEDIASTINUM N/A 1/14/2021    Procedure: IRRIGATION, MEDIASTINUM;  Surgeon: Zenon Corona MD;  [facility];  Service: Cardiovascular;  Laterality: N/A;    KNEE ARTHROSCOPY Right     LEFT VENTRICULAR ASSIST DEVICE Left 1/13/2021    Procedure: INSERTION- HeartMate 3 LEFT VENTRICULAR ASSIST DEVICE ;  Surgeon: Zenon Corona MD;  [facility];  Service: Cardiovascular;  Laterality: Left;    RECONSTRUCTION OF PERICARDIUM N/A 1/14/2021    Procedure: RECONSTRUCTION, PERICARDIUM;  Surgeon: Zenon Corona MD;  [facility];  Service: Cardiovascular;  Laterality: N/A;    RIGHT HEART CATHETERIZATION Right 11/2/2020    Procedure: INSERTION, CATHETER, RIGHT HEART;  Surgeon: Deana Lake MD;  Location: [facility] CATH LAB;  Service: Cardiology;  Laterality: Right;    STERNAL WOUND CLOSURE  1/13/2021    Procedure: TEMPORARY CLOSURE OF CHEST;  Surgeon: Zenon Corona MD;  [facility];  Service: Cardiovascular;;    STERNAL WOUND CLOSURE N/A 1/14/2021    Procedure: CLOSURE, WOUND, STERNUM;  Surgeon: Zenon Corona MD;  [facility];  Service: Cardiovascular;  Laterality: N/A;       Review of patient's allergies indicates:   Allergen Reactions    Pcn [penicillins] Hives       No current facility-administered medications on file prior to encounter.     Current Outpatient Medications on File Prior to Encounter   Medication Sig    aspirin 325 MG tablet Take 1 tablet (325  mg total) by mouth once daily.    atorvastatin (LIPITOR) 80 MG tablet Take 1 tablet (80 mg total) by mouth once daily.    docusate sodium (COLACE) 100 MG capsule Take 100 mg by mouth Daily.    lisinopriL (PRINIVIL,ZESTRIL) 20 MG tablet Take 1/2 tablet (10 mg total) by mouth every morning and take 1 tablet (20 mg total) every evening.    magnesium oxide (MAG-OX) 400 mg (241.3 mg magnesium) tablet Take 1 tablet (400 mg total) by mouth 3 (three) times daily.    polyethylene glycol (GLYCOLAX) 17 gram PwPk Mix 1 packet (17 g) with liquid and take by mouth daily as needed.    SITagliptin (JANUVIA) 100 MG Tab Take 1 tablet (100 mg total) by mouth once daily.    torsemide (DEMADEX) 20 MG Tab Take 2 tablets (40 mg total) by mouth once daily.    warfarin (COUMADIN) 5 MG tablet Take a half tablet (2.5mg) by mouth on 2/4, then take a full tablet (5mg) by mouth daily thereafter.     Family History     Problem Relation (Age of Onset)    Heart attack Mother, Brother    Heart failure Brother    Hypertension Brother        Tobacco Use    Smoking status: Current Some Day Smoker     Types: Cigarettes    Smokeless tobacco: Never Used   Substance and Sexual Activity    Alcohol use: No    Drug use: Not on file    Sexual activity: Not on file     Review of Systems   Constitutional: Negative for chills, decreased appetite and fever.   HENT: Negative for congestion and sore throat.    Eyes: Negative for blurred vision and discharge.   Cardiovascular: Negative for chest pain, claudication, cyanosis, dyspnea on exertion and leg swelling.   Respiratory: Negative for cough, hemoptysis and shortness of breath.    Endocrine: Negative for cold intolerance and heat intolerance.   Skin: Negative for color change.   Musculoskeletal: Negative for muscle weakness and myalgias.   Gastrointestinal: Negative for bloating and abdominal pain.   Neurological: Negative for dizziness, focal weakness and weakness.   Psychiatric/Behavioral: Negative  for altered mental status and depression.     Objective:     Vital Signs (Most Recent):  Temp: 97.8 °F (36.6 °C) (02/03/22 1500)  Pulse: 99 (02/03/22 1500)  Resp: 20 (02/03/22 1544)  BP: (!) 80/0 (02/03/22 1200)  SpO2: 100 % (02/03/22 1500) Vital Signs (24h Range):  Temp:  [97.5 °F (36.4 °C)-97.8 °F (36.6 °C)] 97.8 °F (36.6 °C)  Pulse:  [] 99  Resp:  [10-39] 20  SpO2:  [92 %-100 %] 100 %  BP: (72-90)/(0) 80/0  Arterial Line BP: ()/(60-78) 90/63       Weight: 82.6 kg (182 lb 1.6 oz)  Body mass index is 23.38 kg/m².    SpO2: 100 %  O2 Device (Oxygen Therapy): room air    Physical Exam  Constitutional:       Appearance: He is well-developed and well-nourished.   HENT:      Head: Normocephalic and atraumatic.      Right Ear: External ear normal.      Left Ear: External ear normal.   Eyes:      Extraocular Movements: EOM normal.      Conjunctiva/sclera: Conjunctivae normal.   Cardiovascular:      Rate and Rhythm: Tachycardia present.      Pulses: Intact distal pulses.           Radial pulses are 2+ on the right side and 2+ on the left side.      Comments: +vad Humm  Pulmonary:      Effort: Pulmonary effort is normal. No respiratory distress.      Breath sounds: Normal breath sounds. No wheezing.   Abdominal:      General: Bowel sounds are normal. There is no distension.      Palpations: Abdomen is soft.      Tenderness: There is no abdominal tenderness.   Musculoskeletal:         General: No edema. Normal range of motion.      Cervical back: Normal range of motion and neck supple.   Skin:     General: Skin is warm and dry.   Neurological:      Mental Status: He is alert and oriented to person, place, and time.         Significant Labs:   CMP:   Recent Labs   Lab 02/02/22  0312 02/02/22  1155 02/02/22  2324 02/03/22  0356 02/03/22  1247      < > 135* 139 140   K 4.2  --   --  3.8  --      --   --  111*  --    CO2 18*  --   --  24  --    *  --   --  117*  --    BUN 12  --   --  14  --     CREATININE 0.8  --   --  0.8  --    CALCIUM 8.5*  --   --  8.1*  --    PROT 7.7  --   --   --   --    ALBUMIN 2.5*  --   --   --   --    BILITOT 1.7*  --   --   --   --    ALKPHOS 116  --   --   --   --    AST 17  --   --   --   --    ALT 6*  --   --   --   --    ANIONGAP 11  --   --  4*  --    ESTGFRAFRICA >60.0  --   --  >60.0  --    EGFRNONAA >60.0  --   --  >60.0  --     < > = values in this interval not displayed.   , CBC:   Recent Labs   Lab 02/02/22 0312 02/02/22 0312 02/03/22 0356   WBC 11.04  --  9.90   HGB 11.1*  --  9.6*   HCT 34.9*   < > 30.9*     --  174    < > = values in this interval not displayed.    and INR:   Recent Labs   Lab 02/02/22 0312 02/03/22  0356   INR 1.2 1.3*       Significant Imaging: Echocardiogram:   2D echo with color flow doppler:   Results for orders placed or performed during the hospital encounter of 09/25/15   2D echo with color flow doppler   Result Value Ref Range    EF + QEF 25 (A) 55 - 65    Mitral Valve Regurgitation MILD     Diastolic Dysfunction No     Est. PA Systolic Pressure 30.04     Mitral Valve Mobility NORMAL     Tricuspid Valve Regurgitation TRIVIAL     Narrative    TEST DESCRIPTION   Technical Quality: This is a technically adequate study.     General: A catheter is present in the right-sided cardiac chambers.     Aorta: The aortic root is normal in size, measuring 3.2 cm at sinotubular junction and 3.5 cm at Sinuses of Valsalva. The proximal ascending aorta is mildly enlarged, measuring 3.8 cm across.     Left Atrium: The left atrial volume index is mildly enlarged, measuring 38.98 cc/m2.     Left Ventricle: The left ventricle is enlarged, with an end-diastolic diameter of 5.9 cm, and an end-systolic diameter of 5.1 cm. LV wall thickness is normal, with the septum measuring 0.7 cm and the posterior wall measuring 0.8 cm across. Relative wall   thickness was normal at 0.27, and the LV mass index was 88.7 g/m2 consistent with normal left ventricular  mass. The inferior wall is akinetic. The following segments were akinetic: mid inferolateral wall, basal anterolateral wall.  The following segments were severely hypokinetic: mid inferoseptum, basal inferoseptum.  Global left ventricular systolic function appears severely depressed. Visually estimated ejection fraction is 25-30%. Global longitudinal speckle-derived LV 2D strain is -10%. The LV Doppler derived stroke volume equals 63.0 ccs. There is global   hypokinesis.   There is normal systolic/diastolic flow in the pulmonary vein indicating normal left atrial pressures. The E/e'(lat) is 5, consistent with normal diastolic function.     Right Atrium: The right atrium is enlarged, measuring 4.7 cm in length and 4.6 cm in width in the apical view.     Right Ventricle: The right ventricle is normal in size measuring 3.4 cm at the base in the apical right ventricle-focused view. Global right ventricular systolic function appears normal. There is abnormal septal motion. Tricuspid annular plane systolic   excursion (TAPSE) is 2.3 cm. Tissue Doppler-derived tricuspid annular peak systolic velocity (S prime) is 14.0 cm/s. The estimated PA systolic pressure is 30 mmHg.     Aortic Valve:  The aortic valve is mildly sclerotic. The aortic valve is tri-leaflet in structure.     Mitral Valve:  The mitral valve is normal in structure with normal leaflet mobility. There is mild mitral regurgitation.     Tricuspid Valve:  The tricuspid valve is normal in structure with normal leaflet mobility. There is trivial tricuspid regurgitation.     Pulmonary Valve:  The pulmonic valve is normal in structure with normal leaflet mobility. There is mild pulmonic regurgitation.     IVC: IVC is normal in size and collapses > 50% with a sniff, suggesting normal right atrial pressure of 3 mmHg.     Intracavitary: There is no evidence of pericardial effusion, intracavity mass, thrombi, or vegetation.     Other: There is a right pleural effusion  present.         CONCLUSIONS     1 - Severely depressed left ventricular systolic function (EF 25-30%).     2 - Biatrial enlargement.     3 - Mildly enlarged ascending aorta.     4 - The estimated PA systolic pressure is 30 mmHg.     5 - Mild mitral regurgitation.     6 - Trivial tricuspid regurgitation.     7 - Right pleural effusion.     8 - Normal right ventricular systolic function .         This document has been electronically    SIGNED BY: Darlyn Quezada MD On: 09/25/2015 15:28    and Transthoracic echo (TTE) complete (Cupid Only):   Results for orders placed or performed during the hospital encounter of 09/22/21   Echo   Result Value Ref Range    BSA 2.08 m2    TDI SEPTAL 0.06 m/s    LV LATERAL E/E' RATIO 6.64 m/s    LV SEPTAL E/E' RATIO 12.17 m/s    LA WIDTH 5.48 cm    TDI LATERAL 0.11 m/s    LVIDd 6.66 (A) 3.5 - 6.0 cm    IVS 0.58 (A) 0.6 - 1.1 cm    Posterior Wall 0.79 0.6 - 1.1 cm    LVIDs 6.00 (A) 2.1 - 4.0 cm    FS 10 28 - 44 %    LA volume 81.12 cm3    Sinus 3.95 cm    STJ 3.50 cm    Ascending aorta 4.00 cm    LV mass 185.61 g    LA size 3.68 cm    RVDD 5.24 cm    TAPSE 1.12 cm    RV S' 7.37 cm/s    Left Ventricle Relative Wall Thickness 0.24 cm    MV valve area p 1/2 method 4.13 cm2    E/A ratio 2.35     Mean e' 0.09 m/s    E wave deceleration time 183.49 msec    LVOT diameter 2.56 cm    LVOT area 5.1 cm2    E/E' ratio 8.59 m/s    MV Peak E Dimitris 0.73 m/s    TR Max Dimitris 2.65 m/s    MV stenosis pressure 1/2 time 53.21 ms    MV Peak A Dimitris 0.31 m/s    LV Systolic Volume 180.31 mL    LV Systolic Volume Index 86.3 mL/m2    LV Diastolic Volume 227.89 mL    LV Diastolic Volume Index 109.04 mL/m2    LA Volume Index 38.8 mL/m2    LV Mass Index 89 g/m2    RA Major Axis 4.91 cm    Left Atrium Minor Axis 4.85 cm    Left Atrium Major Axis 4.62 cm    Triscuspid Valve Regurgitation Peak Gradient 28 mmHg    LA Volume Index (Mod) 49.7 mL/m2    LA volume (mod) 103.92 cm3    RA Width 4.83 cm    Right Atrial Pressure  (from IVC) 8 mmHg    EF 25 %    TV rest pulmonary artery pressure 36 mmHg    Narrative    · There is an LVAD present. Base speed is 5000 RPMs. The pump type is a   Heartmate III. The interventricular septum appears midline. The aortic   valve opens intermittently.  · The left ventricle is moderately enlarged with severely decreased   systolic function. The estimated ejection fraction is 25%.  · There is left ventricular global hypokinesis.  · Moderate right ventricular enlargement with mildly reduced right   ventricular systolic function.  · Grade III left ventricular diastolic dysfunction.  · Mild biatrial enlargement.  · Mild mitral regurgitation.  · The estimated PA systolic pressure is 36 mmHg.  · Intermediate central venous pressure (8 mmHg).  · The sinuses of Valsalva is mildly dilated.  · The ascending aorta is mildly dilated.

## 2022-02-03 NOTE — PROGRESS NOTES
Arrhythmia Department  [employer] ICD    Orders received to interrogate device and assess for arrhythmias. Per order, patient's HR has been in an out of 150's and was unclear if rhythm was sinus or potentially SVT.     INPATIENT device interrogation performed.     Presenting egram demonstrates AsVs  Underlying rhythm c/w SR w/ bursts of AT/SVT  RA pacing 0.1% RV pacing <0.1%  Anticoagulation Status: Warfarin  Atrial arrhythmias: AMS for 1 min 2 secs at 8:05 AM this morning. Short bursts of nsAT/SVT during interrogation. Towards end of interrogation, AT/SVT at 150 bpm was sustained for about 2 minutes (EGM of this under media manger in device pdf)  Ventricular arrhythmias: none  Battery Status/Longevity: 8.5 years      Notified Valentina Jose PA-C of above findings.

## 2022-02-03 NOTE — PT/OT/SLP PROGRESS
"Occupational Therapy   Co-Treatment w PT  Co-treat performed due to acuity and complexity of pt's medical status with 2 skilled disciplines needed to optimize pts functional performance in ICU setting.      Patient Name:  oRcco France   MRN:  [id number]  Admit Date: 1/27/2022  Admitting Diagnosis:  Bilateral subdural hematomas   Length of Stay: 7 days  Recent Surgery: * No surgery found *      Recommendations:     Discharge Recommendations: rehabilitation facility  Discharge Equipment Recommendations:  none  Barriers to discharge:  Other (Comment) (impaired activity tolerance)    Plan:     Patient to be seen 4 x/week to address the above listed problems via self-care/home management,therapeutic activities,therapeutic exercises  · Plan of Care Expires: 02/28/22  · Plan of Care Reviewed with: patient    Assessment:   Rocco France is a 66 y.o. male with a medical diagnosis of Bilateral subdural hematomas.  He presents with the following performance deficits affecting function: weakness,impaired endurance,impaired self care skills,impaired functional mobilty,gait instability,impaired balance,decreased upper extremity function,decreased lower extremity function,decreased safety awareness,impaired cardiopulmonary response to activity.  Pt continues to benefit from a collaborative PT/OT/SLP program to improve quality of life and focus on recovery of impairments.     Once patient is medically stable, patient is safe to discharge home with continued OT services via HHOT.       Rehab Prognosis: Good; patient would benefit from acute skilled OT services to address these deficits and reach maximum level of function.        Subjective   Communicated with: RN prior to session.  Patient found HOB elevated with arterial line,bed alarm,LVAD,peripheral IV,pulse ox (continuous),telemetry upon OT entry to room.    Patient: "i'll do my best"     Pain/Comfort:  · Pain Rating 1: 3/10  · Location - Side 1: Bilateral  · Location - " "Orientation 1: generalized  · Location 1: head  · Pain Addressed 1: Reposition,Nurse notified  · Pain Rating Post-Intervention 1: 3/10    Objective:   Patient found with: arterial line,bed alarm,LVAD,peripheral IV,pulse ox (continuous),telemetry     General Precautions: Standard, Cardiac airborne,aspiration,contact,droplet,fall,LVAD   Orthopedic Precautions:N/A   Braces: N/A   Respiratory Status:    Room air  Vitals: BP (!) 80/0 (BP Location: Left arm, Patient Position: Lying)   Pulse 86   Temp 97.8 °F (36.6 °C) (Oral)   Resp 17   Ht 6' 2" (1.88 m)   Wt 82.6 kg (182 lb 1.6 oz)   SpO2 99%   BMI 23.38 kg/m²     Outcome Measures:  AMPAC 6 Click ADL: 21    Cognition:   · Oriented X 4, Alert and drowsy  · Command following: easily distracted by impaired activity tolerance and follows one-step commands  · Communication: clear/fluent    Occupational Performance:  Bed Mobility:    · Patient completed Rolling/Turning to Right with stand by assistance  · Patient completed Supine to Sit with stand by assistance on R side of bed  · Scooting anteriorly to EOB to have both feet planted on floor: stand by assistance    Functional Mobility/Transfers:   Static Sitting EOB: Supervision   Dynamic Sitting EOB: SBA   Patient completed Sit <> Stand Transfer with stand by assistance  with  hand-held assist    Static Standing Balance: CGA, pt holding IV pole   Dynamic Standing Balance: CGA, pt holding IV pole; multidirectional instability; pt with noted fatigue in standing; x2 brief LOB   Patient completed Bed <> Chair Transfer using Stand Pivot technique with contact guard assistance with hand-held assist      Activities of Daily Living:  · Feeding:  modified independence set up seated in chair  · Grooming: modified independence set up seated in chair; pt unable to tolerate performing in standing  · Upper Body Dressing: stand by assistance donning fresh gown seated EOB    AMPAC 6 Click ADL:  AMPAC Total Score: " 21    Treatment & Education:  -OT POC, safety during ADLs and mobility   -Education on energy conservation and task modification to maximize safety and independence  -Questions answered within OT scope of practice.      Patient left up in chair with all lines intact, call button in reach, RN notified and   present    GOALS:   Multidisciplinary Problems     Occupational Therapy Goals        Problem: Occupational Therapy Goal    Goal Priority Disciplines Outcome Interventions   Occupational Therapy Goal     OT, PT/OT Ongoing, Progressing    Description: Goals set on 1/29 with expiration date 2/12:  Patient will increase functional independence with ADLs by performing:    Supine <> Sit with Gregg.  Grooming while standing at sink with Mod Gregg using DME as needed.   UB Dressing with Gregg.  LB Dressing with Gregg.  Step transfer with Mod Gregg with DME as needed.  Pt will demonstrate understanding of education provided regarding energy conservation and task modification through teach-back method.                      Time Tracking:     OT Date of Treatment: 02/03/22  OT Start Time: 1413  OT Stop Time: 1440  OT Total Time (min): 27 min  Additional staff present: PT      Billable Minutes:Self Care/Home Management 10  Therapeutic Activity 17      2/3/2022

## 2022-02-03 NOTE — HPI
Mr. Rocco France is a 67 yo M with pmhx s/f stage D CHF due to NICMP s/p HM3, s/p ICD, HTN, HLD, T2DM who was brought to OSH  by EMS [place]. Patient does not remember his fall. After being brought to the hospital, he was found to have SDH and SAH. He was subseqeuntly transferred to [facility] for further management. NeuroSg and Vascular Neurology on board as well. Patient reports doing well at this time. He was found to go from sinus tach (110s) to SVT with rate 150s this am. Telemetry reviewed, and his ICD was interrogated. Telemetry shows sharp onset tachycardia with rapid increase in HR. EGMs showing very narrow AV time delay, and they actually appear to be concurrent. Of note, patient stating that he was asymptomatic during this morning's episodes despite it lasting almost a full hour. Per RN, patient's blood pressure did not drop much with the increase in HR. Pt does admit to me that he get intermittently dizzy for short durations prior to this admission, but he has not felt that today.

## 2022-02-03 NOTE — ASSESSMENT & PLAN NOTE
-Patient had a traumatic subdural, subarachnoid hemorrhage after fall on 1/27. Currently no focal deficits  -NCC and NSGY following. Plans for serial imaging pending  -INR 1.6 on admit, 1.3 this morning. Coumadin/ASA on hold. Per NS, would like to hold at least 7 days, preferably 14d if possible.  -Patient had elevated maps above 90 on arrival, but overnight required addition of low dose Levophed to keep MAP > 65. Off of levo since 1/29, reintroduced lisinopril at lower dose 1/31 for MAPs in 90s. Increased to 10mg BID but still with suboptimal bp control. Start doxazosin 2mg BID2/2 with better control.  -Goal Na+ > 135, slowly trickled down days after stopping hypertonic saline. Discussed with NCC, restarted hypertonic saline yesterday with nice rise.  -Keppra started as AED. EEG negative for seizures  -Transcranial dopplers done 1/31 without evidence of vasospasm  -CTH overnight with evolving subdural hematoma, resolved subarachnoid  -Continue q1h neurochecks

## 2022-02-04 LAB
ALBUMIN SERPL BCP-MCNC: 1.8 G/DL (ref 3.5–5.2)
ALBUMIN SERPL BCP-MCNC: 2.2 G/DL (ref 3.5–5.2)
ALLENS TEST: ABNORMAL
ALP SERPL-CCNC: 80 U/L (ref 55–135)
ALP SERPL-CCNC: 95 U/L (ref 55–135)
ALT SERPL W/O P-5'-P-CCNC: 5 U/L (ref 10–44)
ALT SERPL W/O P-5'-P-CCNC: 8 U/L (ref 10–44)
ANION GAP SERPL CALC-SCNC: 4 MMOL/L (ref 8–16)
ANION GAP SERPL CALC-SCNC: 6 MMOL/L (ref 8–16)
APTT BLDCRRT: 32.1 SEC (ref 21–32)
ASCENDING AORTA: 3.55 CM
AST SERPL-CCNC: 13 U/L (ref 10–40)
AST SERPL-CCNC: 17 U/L (ref 10–40)
BASOPHILS # BLD AUTO: 0.02 K/UL (ref 0–0.2)
BASOPHILS NFR BLD: 0.3 % (ref 0–1.9)
BILIRUB DIRECT SERPL-MCNC: 0.6 MG/DL (ref 0.1–0.3)
BILIRUB SERPL-MCNC: 1.1 MG/DL (ref 0.1–1)
BILIRUB SERPL-MCNC: 1.3 MG/DL (ref 0.1–1)
BNP SERPL-MCNC: 412 PG/ML (ref 0–99)
BSA FOR ECHO PROCEDURE: 2.1 M2
BUN SERPL-MCNC: 12 MG/DL (ref 8–23)
BUN SERPL-MCNC: 13 MG/DL (ref 8–23)
CALCIUM SERPL-MCNC: 6.7 MG/DL (ref 8.7–10.5)
CALCIUM SERPL-MCNC: 8.2 MG/DL (ref 8.7–10.5)
CHLORIDE SERPL-SCNC: 113 MMOL/L (ref 95–110)
CHLORIDE SERPL-SCNC: 116 MMOL/L (ref 95–110)
CO2 SERPL-SCNC: 18 MMOL/L (ref 23–29)
CO2 SERPL-SCNC: 22 MMOL/L (ref 23–29)
CREAT SERPL-MCNC: 0.6 MG/DL (ref 0.5–1.4)
CREAT SERPL-MCNC: 0.7 MG/DL (ref 0.5–1.4)
CRP SERPL-MCNC: 19.5 MG/L (ref 0–8.2)
CV ECHO LV RWT: 0.25 CM
DELSYS: ABNORMAL
DIFFERENTIAL METHOD: ABNORMAL
DOP CALC LVOT AREA: 4.6 CM2
DOP CALC LVOT DIAMETER: 2.42 CM
DOP CALC RVOT PEAK VEL: 0.58 M/S
DOP CALC RVOT VTI: 8.39 CM
E WAVE DECELERATION TIME: 151.9 MSEC
E/A RATIO: 0.87
E/E' RATIO: 5.42 M/S
ECHO LV POSTERIOR WALL: 0.88 CM (ref 0.6–1.1)
EJECTION FRACTION: 15 %
EOSINOPHIL # BLD AUTO: 0.1 K/UL (ref 0–0.5)
EOSINOPHIL NFR BLD: 1.8 % (ref 0–8)
ERYTHROCYTE [DISTWIDTH] IN BLOOD BY AUTOMATED COUNT: 15 % (ref 11.5–14.5)
EST. GFR  (AFRICAN AMERICAN): >60 ML/MIN/1.73 M^2
EST. GFR  (AFRICAN AMERICAN): >60 ML/MIN/1.73 M^2
EST. GFR  (NON AFRICAN AMERICAN): >60 ML/MIN/1.73 M^2
EST. GFR  (NON AFRICAN AMERICAN): >60 ML/MIN/1.73 M^2
FRACTIONAL SHORTENING: 5 % (ref 28–44)
GLUCOSE SERPL-MCNC: 107 MG/DL (ref 70–110)
GLUCOSE SERPL-MCNC: 119 MG/DL (ref 70–110)
HCO3 UR-SCNC: 20.6 MMOL/L (ref 24–28)
HCT VFR BLD AUTO: 29.9 % (ref 40–54)
HGB BLD-MCNC: 9.4 G/DL (ref 14–18)
IMM GRANULOCYTES # BLD AUTO: 0.03 K/UL (ref 0–0.04)
IMM GRANULOCYTES NFR BLD AUTO: 0.4 % (ref 0–0.5)
INR PPP: 1.4 (ref 0.8–1.2)
INTERVENTRICULAR SEPTUM: 0.8 CM (ref 0.6–1.1)
LA MAJOR: 5.7 CM
LA MINOR: 6.08 CM
LA WIDTH: 5.3 CM
LDH SERPL L TO P-CCNC: 241 U/L (ref 110–260)
LEFT ATRIUM SIZE: 3.44 CM
LEFT ATRIUM VOLUME INDEX MOD: 37.2 ML/M2
LEFT ATRIUM VOLUME INDEX: 43.2 ML/M2
LEFT ATRIUM VOLUME MOD: 78.52 CM3
LEFT ATRIUM VOLUME: 91.18 CM3
LEFT INTERNAL DIMENSION IN SYSTOLE: 6.56 CM (ref 2.1–4)
LEFT VENTRICLE DIASTOLIC VOLUME INDEX: 118.14 ML/M2
LEFT VENTRICLE DIASTOLIC VOLUME: 249.27 ML
LEFT VENTRICLE MASS INDEX: 120 G/M2
LEFT VENTRICLE SYSTOLIC VOLUME INDEX: 104.4 ML/M2
LEFT VENTRICLE SYSTOLIC VOLUME: 220.33 ML
LEFT VENTRICULAR INTERNAL DIMENSION IN DIASTOLE: 6.92 CM (ref 3.5–6)
LEFT VENTRICULAR MASS: 254.1 G
LV LATERAL E/E' RATIO: 5.42 M/S
LV SEPTAL E/E' RATIO: 5.42 M/S
LYMPHOCYTES # BLD AUTO: 1.1 K/UL (ref 1–4.8)
LYMPHOCYTES NFR BLD: 15.7 % (ref 18–48)
MAGNESIUM SERPL-MCNC: 1.5 MG/DL (ref 1.6–2.6)
MAGNESIUM SERPL-MCNC: 1.7 MG/DL (ref 1.6–2.6)
MCH RBC QN AUTO: 28.8 PG (ref 27–31)
MCHC RBC AUTO-ENTMCNC: 31.4 G/DL (ref 32–36)
MCV RBC AUTO: 92 FL (ref 82–98)
MONOCYTES # BLD AUTO: 0.6 K/UL (ref 0.3–1)
MONOCYTES NFR BLD: 8.3 % (ref 4–15)
MV PEAK A VEL: 0.75 M/S
MV PEAK E VEL: 0.65 M/S
MV STENOSIS PRESSURE HALF TIME: 44.05 MS
MV VALVE AREA P 1/2 METHOD: 4.99 CM2
NEUTROPHILS # BLD AUTO: 5.3 K/UL (ref 1.8–7.7)
NEUTROPHILS NFR BLD: 73.5 % (ref 38–73)
NRBC BLD-RTO: 0 /100 WBC
PCO2 BLDA: 26.9 MMHG (ref 35–45)
PH SMN: 7.49 [PH] (ref 7.35–7.45)
PHOSPHATE SERPL-MCNC: 1.8 MG/DL (ref 2.7–4.5)
PHOSPHATE SERPL-MCNC: 2 MG/DL (ref 2.7–4.5)
PISA TR MAX VEL: 2.34 M/S
PLATELET # BLD AUTO: 176 K/UL (ref 150–450)
PMV BLD AUTO: 11.5 FL (ref 9.2–12.9)
PO2 BLDA: 123 MMHG (ref 80–100)
POC BE: -3 MMOL/L
POC SATURATED O2: 99 % (ref 95–100)
POC TCO2: 21 MMOL/L (ref 23–27)
POCT GLUCOSE: 117 MG/DL (ref 70–110)
POCT GLUCOSE: 132 MG/DL (ref 70–110)
POCT GLUCOSE: 141 MG/DL (ref 70–110)
POTASSIUM SERPL-SCNC: 3.1 MMOL/L (ref 3.5–5.1)
POTASSIUM SERPL-SCNC: 3.7 MMOL/L (ref 3.5–5.1)
PREALB SERPL-MCNC: 9 MG/DL (ref 20–43)
PROT SERPL-MCNC: 5.6 G/DL (ref 6–8.4)
PROT SERPL-MCNC: 6.8 G/DL (ref 6–8.4)
PROTHROMBIN TIME: 14.3 SEC (ref 9–12.5)
PV MEAN GRADIENT: 0.79 MMHG
RA MAJOR: 5.4 CM
RA PRESSURE: 8 MMHG
RA WIDTH: 4.26 CM
RBC # BLD AUTO: 3.26 M/UL (ref 4.6–6.2)
RIGHT VENTRICULAR END-DIASTOLIC DIMENSION: 4.67 CM
SAMPLE: ABNORMAL
SINUS: 3.83 CM
SITE: ABNORMAL
SODIUM SERPL-SCNC: 134 MMOL/L (ref 136–145)
SODIUM SERPL-SCNC: 135 MMOL/L (ref 136–145)
SODIUM SERPL-SCNC: 136 MMOL/L (ref 136–145)
SODIUM SERPL-SCNC: 139 MMOL/L (ref 136–145)
SODIUM SERPL-SCNC: 139 MMOL/L (ref 136–145)
SODIUM SERPL-SCNC: 140 MMOL/L (ref 136–145)
STJ: 3.1 CM
TDI LATERAL: 0.12 M/S
TDI SEPTAL: 0.12 M/S
TDI: 0.12 M/S
TR MAX PG: 22 MMHG
TRICUSPID ANNULAR PLANE SYSTOLIC EXCURSION: 0.79 CM
TV REST PULMONARY ARTERY PRESSURE: 30 MMHG
WBC # BLD AUTO: 7.24 K/UL (ref 3.9–12.7)

## 2022-02-04 PROCEDURE — 25000003 PHARM REV CODE 250: Performed by: HOSPITALIST

## 2022-02-04 PROCEDURE — 97530 THERAPEUTIC ACTIVITIES: CPT

## 2022-02-04 PROCEDURE — 83880 ASSAY OF NATRIURETIC PEPTIDE: CPT | Performed by: HOSPITALIST

## 2022-02-04 PROCEDURE — 85610 PROTHROMBIN TIME: CPT | Performed by: HOSPITALIST

## 2022-02-04 PROCEDURE — 99291 PR CRITICAL CARE, E/M 30-74 MINUTES: ICD-10-PCS | Mod: ,,, | Performed by: NURSE PRACTITIONER

## 2022-02-04 PROCEDURE — 84295 ASSAY OF SERUM SODIUM: CPT | Performed by: PSYCHIATRY & NEUROLOGY

## 2022-02-04 PROCEDURE — 94761 N-INVAS EAR/PLS OXIMETRY MLT: CPT

## 2022-02-04 PROCEDURE — 99291 CRITICAL CARE FIRST HOUR: CPT | Mod: ,,, | Performed by: NURSE PRACTITIONER

## 2022-02-04 PROCEDURE — 93750 INTERROGATION VAD IN PERSON: CPT | Mod: ,,, | Performed by: INTERNAL MEDICINE

## 2022-02-04 PROCEDURE — 84100 ASSAY OF PHOSPHORUS: CPT | Mod: 91 | Performed by: STUDENT IN AN ORGANIZED HEALTH CARE EDUCATION/TRAINING PROGRAM

## 2022-02-04 PROCEDURE — 84100 ASSAY OF PHOSPHORUS: CPT | Performed by: HOSPITALIST

## 2022-02-04 PROCEDURE — 25000003 PHARM REV CODE 250: Performed by: STUDENT IN AN ORGANIZED HEALTH CARE EDUCATION/TRAINING PROGRAM

## 2022-02-04 PROCEDURE — 84295 ASSAY OF SERUM SODIUM: CPT | Mod: 91 | Performed by: INTERNAL MEDICINE

## 2022-02-04 PROCEDURE — 83735 ASSAY OF MAGNESIUM: CPT | Performed by: HOSPITALIST

## 2022-02-04 PROCEDURE — 80076 HEPATIC FUNCTION PANEL: CPT | Performed by: HOSPITALIST

## 2022-02-04 PROCEDURE — 63600175 PHARM REV CODE 636 W HCPCS: Performed by: STUDENT IN AN ORGANIZED HEALTH CARE EDUCATION/TRAINING PROGRAM

## 2022-02-04 PROCEDURE — 27000248 HC VAD-ADDITIONAL DAY

## 2022-02-04 PROCEDURE — 80053 COMPREHEN METABOLIC PANEL: CPT | Performed by: STUDENT IN AN ORGANIZED HEALTH CARE EDUCATION/TRAINING PROGRAM

## 2022-02-04 PROCEDURE — 99233 SBSQ HOSP IP/OBS HIGH 50: CPT | Mod: ,,, | Performed by: NEUROLOGICAL SURGERY

## 2022-02-04 PROCEDURE — 20000000 HC ICU ROOM

## 2022-02-04 PROCEDURE — 27000207 HC ISOLATION

## 2022-02-04 PROCEDURE — 85730 THROMBOPLASTIN TIME PARTIAL: CPT | Performed by: HOSPITALIST

## 2022-02-04 PROCEDURE — 99232 SBSQ HOSP IP/OBS MODERATE 35: CPT | Mod: GC,,, | Performed by: PSYCHIATRY & NEUROLOGY

## 2022-02-04 PROCEDURE — 97535 SELF CARE MNGMENT TRAINING: CPT

## 2022-02-04 PROCEDURE — 83615 LACTATE (LD) (LDH) ENZYME: CPT | Performed by: HOSPITALIST

## 2022-02-04 PROCEDURE — 85025 COMPLETE CBC W/AUTO DIFF WBC: CPT | Performed by: HOSPITALIST

## 2022-02-04 PROCEDURE — 99233 PR SUBSEQUENT HOSPITAL CARE,LEVL III: ICD-10-PCS | Mod: ,,, | Performed by: NEUROLOGICAL SURGERY

## 2022-02-04 PROCEDURE — 93750 PR INTERROGATE VENT ASSIST DEV, IN PERSON, W PHYSICIAN ANALYSIS: ICD-10-PCS | Mod: ,,, | Performed by: INTERNAL MEDICINE

## 2022-02-04 PROCEDURE — 86140 C-REACTIVE PROTEIN: CPT | Performed by: HOSPITALIST

## 2022-02-04 PROCEDURE — 25000003 PHARM REV CODE 250: Performed by: PHYSICIAN ASSISTANT

## 2022-02-04 PROCEDURE — 83735 ASSAY OF MAGNESIUM: CPT | Mod: 91 | Performed by: STUDENT IN AN ORGANIZED HEALTH CARE EDUCATION/TRAINING PROGRAM

## 2022-02-04 PROCEDURE — 25000003 PHARM REV CODE 250: Performed by: NURSE PRACTITIONER

## 2022-02-04 PROCEDURE — 84134 ASSAY OF PREALBUMIN: CPT | Performed by: HOSPITALIST

## 2022-02-04 PROCEDURE — 25500020 PHARM REV CODE 255: Performed by: INTERNAL MEDICINE

## 2022-02-04 PROCEDURE — 99232 PR SUBSEQUENT HOSPITAL CARE,LEVL II: ICD-10-PCS | Mod: GC,,, | Performed by: PSYCHIATRY & NEUROLOGY

## 2022-02-04 PROCEDURE — 97110 THERAPEUTIC EXERCISES: CPT

## 2022-02-04 PROCEDURE — 80048 BASIC METABOLIC PNL TOTAL CA: CPT | Mod: XB | Performed by: HOSPITALIST

## 2022-02-04 RX ORDER — LEVETIRACETAM 500 MG/1
500 TABLET ORAL 2 TIMES DAILY
Status: DISCONTINUED | OUTPATIENT
Start: 2022-02-04 | End: 2022-02-05

## 2022-02-04 RX ORDER — BACLOFEN 5 MG/1
5 TABLET ORAL 3 TIMES DAILY
Status: DISCONTINUED | OUTPATIENT
Start: 2022-02-04 | End: 2022-02-07

## 2022-02-04 RX ORDER — POTASSIUM CHLORIDE 20 MEQ/1
40 TABLET, EXTENDED RELEASE ORAL ONCE
Status: COMPLETED | OUTPATIENT
Start: 2022-02-04 | End: 2022-02-04

## 2022-02-04 RX ORDER — MAGNESIUM SULFATE HEPTAHYDRATE 40 MG/ML
2 INJECTION, SOLUTION INTRAVENOUS ONCE
Status: COMPLETED | OUTPATIENT
Start: 2022-02-04 | End: 2022-02-04

## 2022-02-04 RX ADMIN — FAMOTIDINE 20 MG: 20 TABLET ORAL at 09:02

## 2022-02-04 RX ADMIN — OXYCODONE HYDROCHLORIDE AND ACETAMINOPHEN 1 TABLET: 5; 325 TABLET ORAL at 09:02

## 2022-02-04 RX ADMIN — LEVETIRACETAM 500 MG: 500 TABLET, FILM COATED ORAL at 09:02

## 2022-02-04 RX ADMIN — CHLORHEXIDINE GLUCONATE 0.12% ORAL RINSE 15 ML: 1.2 LIQUID ORAL at 09:02

## 2022-02-04 RX ADMIN — DOXAZOSIN 2 MG: 2 TABLET ORAL at 09:02

## 2022-02-04 RX ADMIN — FUROSEMIDE 40 MG: 40 TABLET ORAL at 06:02

## 2022-02-04 RX ADMIN — POTASSIUM CHLORIDE 40 MEQ: 1500 TABLET, EXTENDED RELEASE ORAL at 05:02

## 2022-02-04 RX ADMIN — MAGNESIUM SULFATE HEPTAHYDRATE 2 G: 40 INJECTION, SOLUTION INTRAVENOUS at 05:02

## 2022-02-04 RX ADMIN — ATORVASTATIN CALCIUM 80 MG: 20 TABLET, FILM COATED ORAL at 09:02

## 2022-02-04 RX ADMIN — LISINOPRIL 10 MG: 10 TABLET ORAL at 09:02

## 2022-02-04 RX ADMIN — Medication 400 MG: at 09:02

## 2022-02-04 RX ADMIN — OXYCODONE HYDROCHLORIDE AND ACETAMINOPHEN 1 TABLET: 5; 325 TABLET ORAL at 12:02

## 2022-02-04 RX ADMIN — BACLOFEN 5 MG: 5 TABLET ORAL at 01:02

## 2022-02-04 RX ADMIN — BACLOFEN 5 MG: 5 TABLET ORAL at 09:02

## 2022-02-04 RX ADMIN — FUROSEMIDE 40 MG: 40 TABLET ORAL at 09:02

## 2022-02-04 RX ADMIN — IOHEXOL 100 ML: 350 INJECTION, SOLUTION INTRAVENOUS at 11:02

## 2022-02-04 RX ADMIN — MUPIROCIN: 20 OINTMENT TOPICAL at 09:02

## 2022-02-04 NOTE — SUBJECTIVE & OBJECTIVE
Interval History: No acute events.      Medications:  Continuous Infusions:   sodium chloride 0.9% 5 mL/hr at 02/04/22 0900     Scheduled Meds:   atorvastatin  80 mg Oral Daily    baclofen  5 mg Oral TID    chlorhexidine  15 mL Mouth/Throat BID    doxazosin  2 mg Oral BID    famotidine  20 mg Oral BID    furosemide  40 mg Oral BID    lisinopriL  10 mg Oral BID    magnesium oxide  400 mg Oral Daily    mupirocin   Nasal BID     PRN Meds:sodium chloride, acetaminophen, dextrose 50%, glucagon (human recombinant), insulin aspart U-100, oxyCODONE-acetaminophen     Review of Systems    Objective:     Weight: 84.6 kg (186 lb 8.2 oz)  Body mass index is 23.95 kg/m².  Vital Signs (Most Recent):  Temp: 97.7 °F (36.5 °C) (02/04/22 0700)  Pulse: (!) 126 (02/04/22 0900)  Resp: (!) 22 (02/04/22 0911)  BP: (!) 84/0 (02/04/22 0800)  SpO2: 100 % (02/04/22 0900) Vital Signs (24h Range):  Temp:  [97.7 °F (36.5 °C)-98 °F (36.7 °C)] 97.7 °F (36.5 °C)  Pulse:  [] 126  Resp:  [7-49] 22  SpO2:  [93 %-100 %] 100 %  BP: ()/(0-85) 84/0  Arterial Line BP: ()/(60-85) 92/72     Date 02/04/22 0700 - 02/05/22 0659   Shift 6432-7112 7557-7394 4762-0418 24 Hour Total   INTAKE   P.O. 840   840   I.V.(mL/kg) 33.5(0.4)   33.5(0.4)   Shift Total(mL/kg) 873.5(10.3)   873.5(10.3)   OUTPUT   Urine(mL/kg/hr) 350   350   Shift Total(mL/kg) 350(4.1)   350(4.1)   Weight (kg) 84.6 84.6 84.6 84.6              NG/OG Tube 01/27/22 2140 18 Fr. Center mouth (Active)   $ NG/OG Tube Placement Complete 01/27/22 2130   Placement Check placement verified by x-ray;placement verified by aspirate characteristics 01/27/22 2309   Tolerance no signs/symptoms of discomfort 01/27/22 2309   Securement secured to commercial device 01/27/22 2309   Clamp Status/Tolerance clamped 01/27/22 2309   Suction Setting/Drainage Method suction at the bedside 01/27/22 2309   Insertion Site Appearance no redness, warmth, tenderness, skin breakdown, drainage 01/27/22  "2309   Flush/Irrigation flushed w/;water;no resistance met 01/27/22 2309            Urethral Catheter 01/27/22 1858 (Active)   $ Duarte Insertion Complete 01/27/22 1947   Site Assessment Clean;Intact 01/28/22 0300   Collection Container Urimeter 01/28/22 0300   Securement Method secured to top of thigh w/ adhesive device 01/28/22 0300   Catheter Care Performed no 01/28/22 0300   Reason for Continuing Urinary Catheterization Critically ill in ICU and requiring hourly monitoring of intake/output 01/28/22 0300   CAUTI Prevention Bundle StatLock in place w 1" slack;Intact seal between catheter & drainage tubing;Drainage bag/urimeter off the floor;Sheeting clip in use;No dependent loops or kinks;Drainage bag/urimeter not overfilled (<2/3 full);Drainage bag/urimeter below bladder 01/28/22 0300   Output (mL) 55 mL 01/28/22 1000       Physical Exam:    Constitutional: He appears well-nourished. No distress.     Eyes: Pupils are equal, round, and reactive to light. EOM are normal.     Cardiovascular: Normal rate and regular rhythm.     Abdominal: Soft.     Psych/Behavior: He is alert.     [id number]  Alert  Answers questions appropriately by nodding/shaking head  PERRL  EOMI  Face Symmetric  BUE 5/5  BLE 5/5  No drift      Significant Labs:  Recent Labs   Lab 02/03/22  0356 02/03/22  1247 02/04/22  0014 02/04/22  0306 02/04/22  0417   *  --   --  107 119*      < > 139 140 139   K 3.8  --   --  3.1* 3.7   *  --   --  116* 113*   CO2 24  --   --  18* 22*   BUN 14  --   --  12 13   CREATININE 0.8  --   --  0.6 0.7   CALCIUM 8.1*  --   --  6.7* 8.2*   MG 1.8  --   --  1.5* 1.7    < > = values in this interval not displayed.     Recent Labs   Lab 02/03/22  0356 02/04/22  0306   WBC 9.90 7.24   HGB 9.6* 9.4*   HCT 30.9* 29.9*    176     Recent Labs   Lab 02/03/22  0356 02/04/22  0306   INR 1.3* 1.4*   APTT 30.6 32.1*     Microbiology Results (last 7 days)     ** No results found for the last 168 hours. **    "     All pertinent labs from the last 24 hours have been reviewed.    Significant Diagnostics:  I have reviewed and interpreted all pertinent imaging results/findings within the past 24 hours.        Physical Exam  Constitutional:       General: He is not in acute distress.     Appearance: He is well-nourished.   Eyes:      Extraocular Movements: EOM normal.      Pupils: Pupils are equal, round, and reactive to light.   Cardiovascular:      Rate and Rhythm: Normal rate and regular rhythm.   Abdominal:      Palpations: Abdomen is soft.   Neurological:      Mental Status: He is alert.

## 2022-02-04 NOTE — SUBJECTIVE & OBJECTIVE
Interval History: No acute issue overnight.     Continuous Infusions:   sodium chloride 0.9% 5 mL/hr at 02/04/22 0900     Scheduled Meds:   atorvastatin  80 mg Oral Daily    baclofen  5 mg Oral TID    chlorhexidine  15 mL Mouth/Throat BID    doxazosin  2 mg Oral BID    famotidine  20 mg Oral BID    furosemide  40 mg Oral BID    lisinopriL  10 mg Oral BID    magnesium oxide  400 mg Oral Daily    mupirocin   Nasal BID     PRN Meds:sodium chloride, acetaminophen, dextrose 50%, glucagon (human recombinant), insulin aspart U-100, oxyCODONE-acetaminophen    Review of patient's allergies indicates:   Allergen Reactions    Pcn [penicillins] Hives     Objective:     Vital Signs (Most Recent):  Temp: 97.7 °F (36.5 °C) (02/04/22 0700)  Pulse: (!) 126 (02/04/22 0900)  Resp: (!) 22 (02/04/22 0911)  BP: (!) 84/0 (02/04/22 0800)  SpO2: 100 % (02/04/22 0900) Vital Signs (24h Range):  Temp:  [97.7 °F (36.5 °C)-98 °F (36.7 °C)] 97.7 °F (36.5 °C)  Pulse:  [] 126  Resp:  [7-49] 22  SpO2:  [93 %-100 %] 100 %  BP: ()/(0-85) 84/0  Arterial Line BP: ()/(60-85) 92/72     Patient Vitals for the past 72 hrs (Last 3 readings):   Weight   02/04/22 0501 84.6 kg (186 lb 8.2 oz)   02/03/22 0545 82.6 kg (182 lb 1.6 oz)   02/02/22 0300 82.4 kg (181 lb 10.5 oz)     Body mass index is 23.95 kg/m².      Intake/Output Summary (Last 24 hours) at 2/4/2022 0948  Last data filed at 2/4/2022 0916  Gross per 24 hour   Intake 2391.22 ml   Output 1175 ml   Net 1216.22 ml           Telemetry: NSR     Physical Exam  Constitutional:       Appearance: Normal appearance.   HENT:      Head: Normocephalic.      Nose: Nose normal.   Neck:      Comments: JVP at about midneck at 45 degrees  Cardiovascular:      Rate and Rhythm: Normal rate and regular rhythm.      Pulses: Normal pulses.      Heart sounds: Normal heart sounds.      Comments: VAD hum present  Pulmonary:      Effort: Pulmonary effort is normal.      Breath sounds: Normal breath  sounds.   Abdominal:      General: Abdomen is flat. Bowel sounds are normal.      Palpations: Abdomen is soft.   Musculoskeletal:         General: Normal range of motion.      Cervical back: Normal range of motion.   Skin:     General: Skin is warm.      Capillary Refill: Capillary refill takes less than 2 seconds.   Neurological:      General: No focal deficit present.      Mental Status: He is alert and oriented to person, place, and time.         Significant Labs:  CBC:  Recent Labs   Lab 02/02/22 0312 02/03/22  0356 02/04/22  0306   WBC 11.04 9.90 7.24   RBC 3.88* 3.38* 3.26*   HGB 11.1* 9.6* 9.4*   HCT 34.9* 30.9* 29.9*    174 176   MCV 90 91 92   MCH 28.6 28.4 28.8   MCHC 31.8* 31.1* 31.4*     BNP:  Recent Labs   Lab 01/31/22  0450 02/02/22  0312 02/04/22  0306   * 996* 412*     CMP:  Recent Labs   Lab 02/02/22  0312 02/02/22  1155 02/03/22  0356 02/03/22  1247 02/04/22  0014 02/04/22  0306 02/04/22  0417   *  --  117*  --   --  107 119*   CALCIUM 8.5*  --  8.1*  --   --  6.7* 8.2*   ALBUMIN 2.5*  --   --   --   --  1.8* 2.2*   PROT 7.7  --   --   --   --  5.6* 6.8      < > 139   < > 139 140 139   K 4.2  --  3.8  --   --  3.1* 3.7   CO2 18*  --  24  --   --  18* 22*     --  111*  --   --  116* 113*   BUN 12  --  14  --   --  12 13   CREATININE 0.8  --  0.8  --   --  0.6 0.7   ALKPHOS 116  --   --   --   --  80 95   ALT 6*  --   --   --   --  5* 8*   AST 17  --   --   --   --  13 17   BILITOT 1.7*  --   --   --   --  1.1* 1.3*    < > = values in this interval not displayed.      Coagulation:   Recent Labs   Lab 02/02/22  0312 02/03/22  0356 02/04/22  0306   INR 1.2 1.3* 1.4*   APTT 28.5 30.6 32.1*     LDH:  Recent Labs   Lab 02/02/22  0312 02/03/22  0356 02/04/22  0306   * 203 241     Microbiology:  Microbiology Results (last 7 days)     ** No results found for the last 168 hours. **          I have reviewed all pertinent labs within the past 24 hours.    Estimated  Creatinine Clearance: 120.7 mL/min (based on SCr of 0.7 mg/dL).    Diagnostic Results:  I have reviewed and interpreted all pertinent imaging results/findings within the past 24 hours.

## 2022-02-04 NOTE — PROGRESS NOTES
"UPDATE    SW spoke to pt's caregiver (wife Meme, [phone]) via telephone for update assessment d/t pt's positive COVID-19 status. Pt's spouse presents via telephone AAOx4 and in good spirits. Spouse stated, "I'm doing as well as expected to be" and reports she is staying at home in [place], [place] during pt's hospital admission. Pt's spouse reports pt is coping appropriately with ongoing medical issues. Pt continues to have good support from family. Psychosocial and emotional support provided to pt's spouse via active listening and discussion. No discharge plans per team at this time. SW will continue to provide psychosocial support, education, resources and assistance with all d/c planning needs when appropriate. Pt and spouse are aware of how to contact SW. SW remains available.   "

## 2022-02-04 NOTE — ASSESSMENT & PLAN NOTE
-Levophed off. MAP goal >65  -Continue Lisinopril 10mg BID. Added doxazosin 2mg BID for better bp control 2/2/21 and Bps have improved

## 2022-02-04 NOTE — PT/OT/SLP PROGRESS
Occupational Therapy   Co-Treatment w PT  Co-treat performed due to acuity and complexity of pt's medical status with 2 skilled disciplines needed to optimize pts functional performance in ICU setting.      Patient Name:  Rocco France   MRN:  [id number]  Admit Date: 1/27/2022  Admitting Diagnosis:  Bilateral subdural hematomas   Length of Stay: 8 days  Recent Surgery: * No surgery found *      Recommendations:     Discharge Recommendations: rehabilitation facility  Discharge Equipment Recommendations:  none  Barriers to discharge:  Inaccessible home environment,Decreased caregiver support (impaired activity tolerance)    Plan:     Patient to be seen 4 x/week to address the above listed problems via self-care/home management,therapeutic activities,therapeutic exercises  · Plan of Care Expires: 02/28/22  · Plan of Care Reviewed with: patient    Assessment:   Rocco France is a 66 y.o. male with a medical diagnosis of Bilateral subdural hematomas.  He presents with the following performance deficits affecting function: weakness,impaired endurance,impaired self care skills,impaired functional mobilty,gait instability,impaired balance,impaired cognition,decreased coordination,decreased upper extremity function,decreased lower extremity function,decreased safety awareness,impaired fine motor,impaired cardiopulmonary response to activity.  Pt continues to benefit from a collaborative PT/OT/SLP program to improve quality of life and focus on recovery of impairments.     Patient's participation in today's session limited by onset of dizziness and weakness with standing/functional mobility trial this date. Pt required x2 person A 2* anterior LOB and impaired BLE coordination during standing, required emergent return to EOB. Once returned to supine, pt endorsed recovery from dizziness. Arterial line and BP not reading correctly during trial, JUANITA vitals. RN at bedside, pt MAP at 77 upon exit.     Rehab Prognosis: Good;  "patient would benefit from acute skilled OT services to address these deficits and reach maximum level of function.        Subjective   Communicated with: RN prior to session.  Patient found HOB elevated with arterial line,bed alarm,blood pressure cuff,central line,peripheral IV,pulse ox (continuous),telemetry,LVAD upon OT entry to room.    Patient: "i'll try"  "i'm just dizzy" "i'm not sure what happened"     Pain/Comfort:  · Pain Rating 1: 0/10  · Pain Rating Post-Intervention 1: 0/10    Objective:   Patient found with: arterial line,bed alarm,blood pressure cuff,central line,peripheral IV,pulse ox (continuous),telemetry,LVAD     General Precautions: Standard, Cardiac airborne,aspiration,contact,droplet,fall,LVAD   Orthopedic Precautions:N/A   Braces: N/A   Respiratory Status:    Room air  Vitals: BP (!) 70/0 (BP Location: Left arm, Patient Position: Lying)   Pulse (!) 120   Temp 97.7 °F (36.5 °C) (Oral)   Resp 17   Ht 6' 2" (1.88 m)   Wt 84.4 kg (186 lb)   SpO2 99%   BMI 23.88 kg/m²     Outcome Measures:  AMPAC 6 Click ADL: 18    Cognition:   · Oriented X 4 and Alert  · Command following: easily distracted by dizziness with mobilty and follows one-step commands  · Communication: clear/fluent    Occupational Performance:  Bed Mobility:    · Supine to Sit: supervision; from Rt side of bed  · Scooting anteriorly to EOB to have both feet planted on floor: stand by assistance    Functional Mobility/Transfers:   Static Sitting EOB: SBA, pt endorsed dizziness, but improved with prolonged seated rest   Dynamic Sitting EOB: SBA   Patient completed Sit <> Stand Transfer with contact guard assistance  with  hand-held assist    Static Standing Balance: CGA x2 person HH assist; sudden onset of MAX A x2 person HH assist   Dynamic Standing Balance: Max A x2 person HH assist  o Short, shuffled unstable and unsteady gait with x2 person A to return to EOB level  o Pt returned to supine *2* dizziness and impaired " reading of BP cuff and mayra    Activities of Daily Living:  · Not performed this date 2* patient dizziness with mobility trial.  · Pt declined additional ADLs this date    [facility] 6 Click ADL:  AMPA Total Score: 18    Treatment & Education:  -OT POC, safety during ADLs and mobility   -Education on energy conservation and task modification to maximize safety and independence  -Questions answered within OT scope of practice.      Patient left HOB elevated with all lines intact, call button in reach, bed alarm on and RN notified and present.     GOALS:   Multidisciplinary Problems     Occupational Therapy Goals        Problem: Occupational Therapy Goal    Goal Priority Disciplines Outcome Interventions   Occupational Therapy Goal     OT, PT/OT Ongoing, Progressing    Description: Goals set on 1/29 with expiration date 2/12:  Patient will increase functional independence with ADLs by performing:    Supine <> Sit with Bates.  Grooming while standing at sink with Mod Bates using DME as needed.   UB Dressing with Bates.  LB Dressing with Bates.  Step transfer with Mod Bates with DME as needed.  Pt will demonstrate understanding of education provided regarding energy conservation and task modification through teach-back method.                      Time Tracking:     OT Date of Treatment: 02/04/22  OT Start Time: 1408  OT Stop Time: 1442  OT Total Time (min): 34 min  Additional staff present: PT      Billable Minutes:Self Care/Home Management 34      2/4/2022

## 2022-02-04 NOTE — PROGRESS NOTES
No acute events overnight  Neuro exam remains stable and unremarkable  CTA reveals mixed density subdural collections, no vasospasm  Impression: benign probably venous perimesencephalic SAH  No radiological or clinical evidence of vasospasm  I do not feel he still requires close neurological monitoring  Timing of resumption of anticoagulation would defer to neurosurgery but would not restart until there is complete absence of acute blood on CT head  Will sign off

## 2022-02-04 NOTE — ASSESSMENT & PLAN NOTE
- Extubated 1/28 and on RA  - Tx'd with remdesivir which was since discontinued. Asymptomatic from covid perspective

## 2022-02-04 NOTE — ASSESSMENT & PLAN NOTE
-Patient had a traumatic subdural, subarachnoid hemorrhage after fall on 1/27. Currently no focal deficits  -NCC and NSGY following. Plans for serial imaging pending  -INR subtherapeutic. Coumadin/ASA on hold. Per NS, would like to hold at least 7 days, preferably 14d if possible.  -BP controlled.   -Goal Na+ > 135, slowly trickled down days after stopping hypertonic saline.    -Keppra started as AED. EEG negative for seizures  -Transcranial dopplers done 1/31 without evidence of vasospasm  -Continue q1h neurochecks

## 2022-02-04 NOTE — ASSESSMENT & PLAN NOTE
66M PMHx cardiomyopathy, s/p LVAD on coumadin and CAD s/p stenting on ASA, presenting after syncopal event and fall with hitting head, found to have bilateral SDH R>L on CT at OSH, rpt CT with new cisternal SAH, negative aneurysm or vascular malformation on CTA, likely nonaneurysmal perimesencephalic SAH      --recommend transcranial dopplers to monitor for vasospasm.   -- CICU primary  -- q1 hour vitals/neurochecks  -- SBP < 140  -- Na > 135  -- Keppra 500 BID  -- INR 1.6 >> 1.3   -- Hold ASA/coumadin: no need for reversal at this time  -- All imaging reviewed   -- CTH 1/27 OSH: bilat aucte on chronic SDH, R > L, no shift, no hydro, cisterns open.    -- CTH 1/27 rpt: new SAH in interpeduncular and quadrigeminal cisterns   -- CTA 1/27: negative for aneurysm or vascular malformation   -- CTH 2/1 interval improvement of SDH and interval clearance of cisternal SAH   -- No recommendation for angiogram at this time, SAH likely related to venous plexus bleed   --seizure and AEDs per NCC     No signs of vasospasm at this time. Will pursue CTA to evaluate for resolution of or no new mycotic aneurysm. If negative CTA is unremarkable we will sign off with no surgical intervention.

## 2022-02-04 NOTE — PROGRESS NOTES
[facility] - Cardiac Intensive Care  Neurosurgery  Progress Note    Subjective:     History of Present Illness: 66M PMHx cardiomyopathy, s/p LVAD on coumadin and CAD s/p stenting on ASA, presenting after fall, consulted to NSGY for SDH. Pt had syncopal event earlier today with prodrome of dizziness, fell, hit head, went to OSH for workup and was complaining of occipital H/A. At OSH, pt had acute neurologic decline, had to be intubated, and presents to [facility] intubated, sedated and with no motor activity. However, 2 hours after presentation to [facility], pt's neurologic status has improved to awake, alert, and following commands briskly with minor headache.       Post-Op Info:  * No surgery found *         Interval History: No acute events.      Medications:  Continuous Infusions:   sodium chloride 0.9% 5 mL/hr at 02/04/22 0900     Scheduled Meds:   atorvastatin  80 mg Oral Daily    baclofen  5 mg Oral TID    chlorhexidine  15 mL Mouth/Throat BID    doxazosin  2 mg Oral BID    famotidine  20 mg Oral BID    furosemide  40 mg Oral BID    lisinopriL  10 mg Oral BID    magnesium oxide  400 mg Oral Daily    mupirocin   Nasal BID     PRN Meds:sodium chloride, acetaminophen, dextrose 50%, glucagon (human recombinant), insulin aspart U-100, oxyCODONE-acetaminophen     Review of Systems    Objective:     Weight: 84.6 kg (186 lb 8.2 oz)  Body mass index is 23.95 kg/m².  Vital Signs (Most Recent):  Temp: 97.7 °F (36.5 °C) (02/04/22 0700)  Pulse: (!) 126 (02/04/22 0900)  Resp: (!) 22 (02/04/22 0911)  BP: (!) 84/0 (02/04/22 0800)  SpO2: 100 % (02/04/22 0900) Vital Signs (24h Range):  Temp:  [97.7 °F (36.5 °C)-98 °F (36.7 °C)] 97.7 °F (36.5 °C)  Pulse:  [] 126  Resp:  [7-49] 22  SpO2:  [93 %-100 %] 100 %  BP: ()/(0-85) 84/0  Arterial Line BP: ()/(60-85) 92/72     Date 02/04/22 0700 - 02/05/22 0659   Shift 0578-9994 0450-5744 5621-1004 24 Hour Total   INTAKE   P.O. 840   840   I.V.(mL/kg) 33.5(0.4)    "33.5(0.4)   Shift Total(mL/kg) 873.5(10.3)   873.5(10.3)   OUTPUT   Urine(mL/kg/hr) 350   350   Shift Total(mL/kg) 350(4.1)   350(4.1)   Weight (kg) 84.6 84.6 84.6 84.6              NG/OG Tube 01/27/22 2140 18 Fr. Center mouth (Active)   $ NG/OG Tube Placement Complete 01/27/22 2130   Placement Check placement verified by x-ray;placement verified by aspirate characteristics 01/27/22 2309   Tolerance no signs/symptoms of discomfort 01/27/22 2309   Securement secured to commercial device 01/27/22 2309   Clamp Status/Tolerance clamped 01/27/22 2309   Suction Setting/Drainage Method suction at the bedside 01/27/22 2309   Insertion Site Appearance no redness, warmth, tenderness, skin breakdown, drainage 01/27/22 2309   Flush/Irrigation flushed w/;water;no resistance met 01/27/22 2309            Urethral Catheter 01/27/22 1858 (Active)   $ Duarte Insertion Complete 01/27/22 1947   Site Assessment Clean;Intact 01/28/22 0300   Collection Container Urimeter 01/28/22 0300   Securement Method secured to top of thigh w/ adhesive device 01/28/22 0300   Catheter Care Performed no 01/28/22 0300   Reason for Continuing Urinary Catheterization Critically ill in ICU and requiring hourly monitoring of intake/output 01/28/22 0300   CAUTI Prevention Bundle StatLock in place w 1" slack;Intact seal between catheter & drainage tubing;Drainage bag/urimeter off the floor;Sheeting clip in use;No dependent loops or kinks;Drainage bag/urimeter not overfilled (<2/3 full);Drainage bag/urimeter below bladder 01/28/22 0300   Output (mL) 55 mL 01/28/22 1000       Physical Exam:    Constitutional: He appears well-nourished. No distress.     Eyes: Pupils are equal, round, and reactive to light. EOM are normal.     Cardiovascular: Normal rate and regular rhythm.     Abdominal: Soft.     Psych/Behavior: He is alert.     [id number]  Alert  Answers questions appropriately by nodding/shaking head  PERRL  EOMI  Face Symmetric  BUE 5/5  BLE 5/5  No " drift      Significant Labs:  Recent Labs   Lab 02/03/22  0356 02/03/22  1247 02/04/22  0014 02/04/22  0306 02/04/22  0417   *  --   --  107 119*      < > 139 140 139   K 3.8  --   --  3.1* 3.7   *  --   --  116* 113*   CO2 24  --   --  18* 22*   BUN 14  --   --  12 13   CREATININE 0.8  --   --  0.6 0.7   CALCIUM 8.1*  --   --  6.7* 8.2*   MG 1.8  --   --  1.5* 1.7    < > = values in this interval not displayed.     Recent Labs   Lab 02/03/22  0356 02/04/22  0306   WBC 9.90 7.24   HGB 9.6* 9.4*   HCT 30.9* 29.9*    176     Recent Labs   Lab 02/03/22  0356 02/04/22  0306   INR 1.3* 1.4*   APTT 30.6 32.1*     Microbiology Results (last 7 days)     ** No results found for the last 168 hours. **        All pertinent labs from the last 24 hours have been reviewed.    Significant Diagnostics:  I have reviewed and interpreted all pertinent imaging results/findings within the past 24 hours.        Physical Exam  Constitutional:       General: He is not in acute distress.     Appearance: He is well-nourished.   Eyes:      Extraocular Movements: EOM normal.      Pupils: Pupils are equal, round, and reactive to light.   Cardiovascular:      Rate and Rhythm: Normal rate and regular rhythm.   Abdominal:      Palpations: Abdomen is soft.   Neurological:      Mental Status: He is alert.           Assessment/Plan:     Subarachnoid bleed  66M PMHx cardiomyopathy, s/p LVAD on coumadin and CAD s/p stenting on ASA, presenting after syncopal event and fall with hitting head, found to have bilateral SDH R>L on CT at OSH, rpt CT with new cisternal SAH, negative aneurysm or vascular malformation on CTA, likely nonaneurysmal perimesencephalic SAH      --recommend transcranial dopplers to monitor for vasospasm.   -- CICU primary  -- q1 hour vitals/neurochecks  -- SBP < 140  -- Na > 135  -- Keppra 500 BID  -- INR 1.6 >> 1.3   -- Hold ASA/coumadin: no need for reversal at this time  -- All imaging reviewed   -- CTH  1/27 OSH: bilat aucte on chronic SDH, R > L, no shift, no hydro, cisterns open.    -- CTH 1/27 rpt: new SAH in interpeduncular and quadrigeminal cisterns   -- CTA 1/27: negative for aneurysm or vascular malformation   -- CTH 2/1 interval improvement of SDH and interval clearance of cisternal SAH   -- No recommendation for angiogram at this time, SAH likely related to venous plexus bleed   --seizure and AEDs per NCC     No signs of vasospasm at this time. Will pursue CTA to evaluate for resolution of or no new mycotic aneurysm. If negative CTA is unremarkable we will sign off with no surgical intervention.             Ollie Wyatt MD  Neurosurgery  [facility] - Cardiac Intensive [facility]

## 2022-02-04 NOTE — PT/OT/SLP PROGRESS
Physical Therapy Co-Treatment    Patient Name:  Rocco France   MRN:  [id number]  Admitting Diagnosis:  Bilateral subdural hematomas   Recent Surgery: * No surgery found *    Admit Date: 1/27/2022  Length of Stay: 8 days    Recommendations:     Discharge Recommendations:  rehabilitation facility   Discharge Equipment Recommendations: none   Barriers to discharge: trending medical condition, weakness, impaired activity tolerance, impaired balance, increased fall risk    Assessment:     Rocco France is a 66 y.o. male admitted with a medical diagnosis of Bilateral subdural hematomas.  He presents with the following impairments/functional limitations:  weakness,impaired endurance,impaired self care skills,impaired functional mobilty,gait instability,impaired balance,decreased upper extremity function,decreased lower extremity function,decreased safety awareness,pain,impaired cardiopulmonary response to activity,impaired fine motor,impaired coordination. Pt with fair tolerance to today's session. Pt initially able to transfer to EOB without assist. Once EOB pt endorsing some dizziness but VSS. Pt able to stand but when attempting to ambulate pt with fwd LOB and poor step strategy to recover with pt requiring assist from therapists to safely return to bed and prevent fall. Pt reported once standing his dizziness increased and he thought it would pass so he attempted to start ambulating. Due to this pt returned to supine.    Pt is an excellent candidate for inpatient rehabilitation, as pt has a qualifying diagnosis, displays good activity tolerance, has experienced decline from PLOF, has good family support, and is motivated to participate in therapy. Pt's clinical condition meets full inpatient rehab criteria. Inpatient rehab will provide to total interdisciplinary treatment approach needed. Pt is at high risk of unplanned readmission due to fall risk. The lower level of care cannot provide total interdisciplinary  approach needed.     Pt will continue to benefit from skilled PT services during this hospital admit to continue to improve transfer ability and efficiency as well as continue to progress pt's ambulation distance and cardiopulmonary endurance to maximize pt's functional independence and return to PLOF.      Rehab Prognosis: Good; patient would benefit from acute skilled PT services to address these deficits and reach maximum level of function.    Recent Surgery: * No surgery found *      Plan:     During this hospitalization, patient to be seen 4 x/week to address the identified rehab impairments via gait training,therapeutic activities,therapeutic exercises,neuromuscular re-education and progress toward the following goals:    · Plan of Care Expires:  03/03/22    Subjective     RN notified prior to session. No family/friends present upon PT entrance into room.    Chief Complaint: Pt agreeable to participate in PT session  Patient/Family Comments/goals: get stronger  Pain/Comfort:  Pain Rating 1: 0/10  Pain Rating Post-Intervention 1: 0/10      Objective:     Additional staff present: OT for cotx due to pt's multiple medical comorbidities and functional deficits req'ing two skilled therapists to appropriately progress pt's musculoskeletal strength, neuromuscular control, and endurance while taking into consideration severe medical acuity in the ICU    Patient found HOB elevated with: telemetry,blood pressure cuff,pulse ox (continuous),LVAD,peripheral IV,arterial line   Cognition:   · Alert and Cooperative  · AxOx4  · Command following: Follows multistep verbal commands  · Fluency: clear/fluent  General Precautions: Standard, Cardiac airborne,aspiration,contact,droplet,fall,LVAD   Orthopedic Precautions:N/A   Braces: N/A   Body mass index is 23.88 kg/m².  Oxygen Device: Room Air  Vitals: BP (!) 70/0 (BP Location: Left arm, Patient Position: Lying)   Pulse (!) 120   Temp 97.7 °F (36.5 °C) (Oral)   Resp 17   Ht 6'  "2" (1.88 m)   Wt 84.4 kg (186 lb)   SpO2 99%   BMI 23.88 kg/m²     Outcome Measures:  AM-PAC 6 CLICK MOBILITY  Turning over in bed (including adjusting bedclothes, sheets and blankets)?: 3  Sitting down on and standing up from a chair with arms (e.g., wheelchair, bedside commode, etc.): 3  Moving from lying on back to sitting on the side of the bed?: 3  Moving to and from a bed to a chair (including a wheelchair)?: 3  Need to walk in hospital room?: 3  Climbing 3-5 steps with a railing?: 2  Basic Mobility Total Score: 17     Functional Mobility:    Bed Mobility:   · Supine to Sit: supervision; from Rt side of bed  · Scooting anteriorly to EOB to have both feet planted on floor: stand by assistance    Sitting Balance at Edge of Bed:   Static Sitting Balance: Good : able to maintain balance against moderate resistance   Dynamic Sitting Balance: Good- : able to sit unsupported and weight shift across midline minimally   Assistance Level Required: Stand-by Assistance   Time: 15 minutes   Postural deviations noted: slouched posture, rounded shoulders and forward head   Comments: Time EOB focused primarily on tolerance to upright positioning, cardiopulmonary response and endurance for activities, and strength of postural musculature to perform dynamic sitting to prepare for tasks in the home. Pt able to accept internal perturbations from self and perform lateral reaches inside TAMI with no LOB.    Transfers:   · Sit <> Stand Transfer: stand by assistance with no assistive device   · Stand <> Sit Transfer: stand by assistance with no assistive device   · [id number] from EOB    Standing Balance:   Static Standing Balance: Good- : able to maintain standing balance against minimal resistance   Dynamic Standing Balance: Fair : stand independently unsupported, weight shift, and reach ipsilaterally. LOB noted when crossing midline.   Assistance Level Required: Contact Guard Assistance regressing to Maximal " Assistance   Patient used: no assistive device    Comments: Pt initially with static standing of CGA. Pt attempting to progress to ambulation but as pt took step he had anterior LOB and utilized an inappropriate step strategy to recover taking multiple small, shuffling steps instead of one large step. Pt required Max A from therapist to prevent LOB. Pt returned to sitting after LOB. Pt reporting increased dizziness with standing which pt reported he expected to go away with time which is why he attempted to ambulate. All VSS throughout episode.      Gait: not performed due to LOB and dizziness with standing.     Education:   Time provided for education, counseling and discussion of health disposition in regards to patient's current status   All questions answered within PT scope of practice and to patient's satisfaction   PT role in POC to address current functional deficits   Pt educated on proper body mechanics, safety techniques, and energy conservation with PT facilitation and cueing throughout session   Call nursing/pct to transfer to chair/use bathroom. Pt stated understanding.   Whiteboard updated with therapist name and pt's current mobility status documented above   Safe to perform stand pivot transfer to/from chair/bedside commode CGA x 2 persons for line mgmt and no AD w/ nursing/PCT present   Importance of OOB tolerance 3-4 hrs/day to improve lung ventilation and expansion as well as strengthen postural musculature   LVAD: patient found on wall power / returned on wall power. Ni alarms sounded.     Patient left up in chair with all lines intact, call button in reach, RN notified and rN present.    GOALS:   Multidisciplinary Problems     Physical Therapy Goals        Problem: Physical Therapy Goal    Goal Priority Disciplines Outcome Goal Variances Interventions   Physical Therapy Goal     PT, PT/OT Ongoing, Progressing     Description: Goals to be met by: 2/12/2022      Patient will increase  functional independence with mobility by performin. Supine to sit with Modified [place]  2. Sit to stand transfer with Modified [place]  3. Bed to chair transfer with Modified [place] using the least restrictive device.   4. Gait  x 150 feet with Modified [place] using the least restrictive device.                     Time Tracking:     PT Received On: 22  PT Start Time: 1405     PT Stop Time: 1443  PT Total Time (min): 38 min     Billable Minutes:   · Therapeutic Activity 25 minutes and Therapeutic Exercise 13 minutes    Treatment Type: Treatment  PT/PTA: PT       Rosa Sevilla PT, DPT  2022  Pager: [phone]

## 2022-02-04 NOTE — ASSESSMENT & PLAN NOTE
-S/P DT HM3 1/13/21  -Current speed 5000  -INR goal 2.0-3.0. Current INR subtherapeutic. Coumadin and ASA remain on hold  -LDH stable    Procedure: Device Interrogation Including analysis of device parameters  Current Settings: Ventricular Assist Device  Review of device function is stable/unstable stable         TXP LVAD INTERROGATIONS 2/4/2022 2/4/2022 2/4/2022 2/4/2022 2/4/2022 2/4/2022 2/4/2022   Type HeartMate3 HeartMate3 HeartMate3 HeartMate3 HeartMate3 HeartMate3 HeartMate3   Flow 4.3 4.1 4.1 4.2 4.2 4.3 4.3   Speed 5000 5000 5000 5000 5050 5000 5000   PI 4 5.1 4.8 5.0 4.1 4.5 4.1   Power ([place]) 3.4 3.1 3.4 3.4 3.3 3.3 3.4   LSL 4500 4500 4500 4500 4500 4500 4500   Pulsatility Intermittent pulse Intermittent pulse Intermittent pulse Intermittent pulse Intermittent pulse Intermittent pulse Intermittent pulse

## 2022-02-04 NOTE — PROGRESS NOTES
"02/04/2022  Vanessa Ramires    Current provider:  Mike Chauhan MD    Device interrogation:  TXP LVAD INTERROGATIONS 2/4/2022 2/4/2022 2/4/2022 2/4/2022 2/4/2022 2/4/2022 2/4/2022   Type HeartMate3 HeartMate3 HeartMate3 HeartMate3 HeartMate3 HeartMate3 HeartMate3   Flow 4.3 4.1 4.1 4.2 4.2 4.3 4.3   Speed 5000 5000 5000 5000 5050 5000 5000   PI 4 5.1 4.8 5.0 4.1 4.5 4.1   Power ([place]wards) 3.4 3.1 3.4 3.4 3.3 3.3 3.4   LSL 4500 4500 4500 4500 4500 4500 4500   Pulsatility Intermittent pulse Intermittent pulse Intermittent pulse Intermittent pulse Intermittent pulse Intermittent pulse Intermittent pulse          As floor rounding has been requested to be limited during COVID-19 patient quarantine by Infectious Disease and leadership, only "electronic" rounding has been performed on this mechanical assist device patient.  Electronic rounding includes verifying in Epic that current settings and measurements for the device have been documented appropriately and that they are within limits.  Additionally, this rounding verifies that the EQUIPMENT section of the VAD flowsheet is documented in Epic, specifically checking the presence of emergency equipment and controller self test.  Any discrepancies will be related to the care team.    For implantable VADs: Interrogation of Ventricular assist device was performed with analysis of device parameters and review of device function. I have personally reviewed the interrogation findings and agree with findings as stated.     In emergency, the nursing units have been notified to contact the perfusion department either by:  Calling [phone] from 630am to 4pm Mon thru Fri, or by contacting the hospital  from 4pm to 630am and on weekends and asking to speak with the perfusionist on call.    Vanessa Ramires  9:35 AM  "

## 2022-02-04 NOTE — PROGRESS NOTES
Rounded on patient, just returning from CT. Pt resting in bed, AAO, no family/caregiver at bedside. Pt reports feeling well. VAD interrogated, no alarms.

## 2022-02-04 NOTE — PLAN OF CARE
Care Plan    POC reviewed with Rocco France. Questions and concerns addressed at bedside. Pt required one dose of 10mg hydralazine d/t MAPs >90 with an adequate response. Throughout rest of shift pt with MAPs mostly in the 70s. Pt remained in a. fib throughout shift at 110-130's, with a few asymptomatic jumps up to the 140s/150s. MD aware. Oxygen saturations % throughout night. PRN percocet administered, as ordered, one time for 4/10 headache pain. Remains Aox4. No VAD alarms. Sodium levels 137, 139, and 139 with 3% NaCl gtt off throughout night. Will continue to monitor. See below and flowsheets for full assessment and VS info.       Neuro:  [place] Coma Scale  Best Eye Response: 4-->(E4) spontaneous  Best Motor Response: 6-->(M6) obeys commands  Best Verbal Response: 5-->(V5) oriented  [place] Coma Scale Score: 15  Assessment Qualifiers: patient not sedated/intubated  Pupil PERRLA: yes  24 hr Temp:  [97.8 °F (36.6 °C)-98 °F (36.7 °C)]      CV:  Rhythm: atrial rhythm  DVT prophylaxis: VTE Required Core Measure: (TEDs) Compression stocking therapy initiated/maintained,(SCDs) Sequential compression device initiated/maintained    Resp:  O2 Device (Oxygen Therapy): room air    GI/:  GI prophylaxis: yes  Diet/Nutrition Received: low saturated fat/low cholesterol,2 gram sodium  Last Bowel Movement: 02/01/22  Voiding Characteristics: voids spontaneously without difficulty   Intake/Output Summary (Last 24 hours) at 2/4/2022 0627  Last data filed at 2/4/2022 0600  Gross per 24 hour   Intake 1965.24 ml   Output 1225 ml   Net 740.24 ml       Labs/Accuchecks:  Recent Labs   Lab 02/04/22  0306   WBC 7.24   RBC 3.26*   HGB 9.4*   HCT 29.9*         Recent Labs   Lab 02/04/22  0417      K 3.7   CO2 22*   *   BUN 13   CREATININE 0.7   ALKPHOS 95   ALT 8*   AST 17   BILITOT 1.3*      Recent Labs   Lab 02/04/22  0306   INR 1.4*   APTT 32.1*    No results for input(s): CPK, CPKMB, TROPONINI, MB in the  last 168 hours.    Electrolytes: Electrolytes replaced  Accuchecks: Q6H    Gtts/LDAs:   sodium chloride 0.9% Stopped (02/04/22 0548)       Lines/Drains/Airways       Arterial Line              Arterial Line 01/27/22 1947 Left Radial 7 days              Line                   VAD 01/13/21 1211 Left ventricular assist device HeartMate 3 386 days              Peripheral Intravenous Line                   Peripheral IV - Single Lumen 02/01/22 1406 22 G Posterior;Right Hand 2 days         Peripheral IV - Single Lumen 02/01/22 2000 20 G Posterior;Right Forearm 2 days                    Skin/Wounds  Bathing/Skin Care: bath, complete;dressed/undressed;linen changed (02/04/22 0541)  Wounds: No  Wound care consulted: No    Consults  Consults (From admission, onward)          Status Ordering Provider     Inpatient consult to Electrophysiology  Once        Provider:  (Not yet assigned)    Completed MIC RASHID     Inpatient consult to Neuro Critical Care  Once        Provider:  (Not yet assigned)    Completed SHARYN CHAND     Inpatient consult to Neurosurgery  Once        Provider:  (Not yet assigned)    SHARYN Roach

## 2022-02-04 NOTE — PROGRESS NOTES
[facility] - Cardiac Intensive Care  Heart Transplant  Progress Note    Patient Name: Rocco France  MRN: [id number]  Admission Date: 1/27/2022  Hospital Length of Stay: 8 days  Attending Physician: Mike Chauhan MD  Primary Care Provider: Teresa Mendoza NP  Principal Problem:Bilateral subdural hematomas    Subjective:     Interval History: No acute issue overnight.     Continuous Infusions:   sodium chloride 0.9% 5 mL/hr at 02/04/22 0900     Scheduled Meds:   atorvastatin  80 mg Oral Daily    baclofen  5 mg Oral TID    chlorhexidine  15 mL Mouth/Throat BID    doxazosin  2 mg Oral BID    famotidine  20 mg Oral BID    furosemide  40 mg Oral BID    lisinopriL  10 mg Oral BID    magnesium oxide  400 mg Oral Daily    mupirocin   Nasal BID     PRN Meds:sodium chloride, acetaminophen, dextrose 50%, glucagon (human recombinant), insulin aspart U-100, oxyCODONE-acetaminophen    Review of patient's allergies indicates:   Allergen Reactions    Pcn [penicillins] Hives     Objective:     Vital Signs (Most Recent):  Temp: 97.7 °F (36.5 °C) (02/04/22 0700)  Pulse: (!) 126 (02/04/22 0900)  Resp: (!) 22 (02/04/22 0911)  BP: (!) 84/0 (02/04/22 0800)  SpO2: 100 % (02/04/22 0900) Vital Signs (24h Range):  Temp:  [97.7 °F (36.5 °C)-98 °F (36.7 °C)] 97.7 °F (36.5 °C)  Pulse:  [] 126  Resp:  [7-49] 22  SpO2:  [93 %-100 %] 100 %  BP: ()/(0-85) 84/0  Arterial Line BP: ()/(60-85) 92/72     Patient Vitals for the past 72 hrs (Last 3 readings):   Weight   02/04/22 0501 84.6 kg (186 lb 8.2 oz)   02/03/22 0545 82.6 kg (182 lb 1.6 oz)   02/02/22 0300 82.4 kg (181 lb 10.5 oz)     Body mass index is 23.95 kg/m².      Intake/Output Summary (Last 24 hours) at 2/4/2022 0948  Last data filed at 2/4/2022 0916  Gross per 24 hour   Intake 2391.22 ml   Output 1175 ml   Net 1216.22 ml           Telemetry: NSR     Physical Exam  Constitutional:       Appearance: Normal appearance.   HENT:      Head: Normocephalic.      Nose: Nose  normal.   Neck:      Comments: JVP at about midneck at 45 degrees  Cardiovascular:      Rate and Rhythm: Normal rate and regular rhythm.      Pulses: Normal pulses.      Heart sounds: Normal heart sounds.      Comments: VAD hum present  Pulmonary:      Effort: Pulmonary effort is normal.      Breath sounds: Normal breath sounds.   Abdominal:      General: Abdomen is flat. Bowel sounds are normal.      Palpations: Abdomen is soft.   Musculoskeletal:         General: Normal range of motion.      Cervical back: Normal range of motion.   Skin:     General: Skin is warm.      Capillary Refill: Capillary refill takes less than 2 seconds.   Neurological:      General: No focal deficit present.      Mental Status: He is alert and oriented to person, place, and time.         Significant Labs:  CBC:  Recent Labs   Lab 02/02/22 0312 02/03/22  0356 02/04/22  0306   WBC 11.04 9.90 7.24   RBC 3.88* 3.38* 3.26*   HGB 11.1* 9.6* 9.4*   HCT 34.9* 30.9* 29.9*    174 176   MCV 90 91 92   MCH 28.6 28.4 28.8   MCHC 31.8* 31.1* 31.4*     BNP:  Recent Labs   Lab 01/31/22  0450 02/02/22  0312 02/04/22  0306   * 996* 412*     CMP:  Recent Labs   Lab 02/02/22  0312 02/02/22  1155 02/03/22  0356 02/03/22  1247 02/04/22  0014 02/04/22  0306 02/04/22  0417   *  --  117*  --   --  107 119*   CALCIUM 8.5*  --  8.1*  --   --  6.7* 8.2*   ALBUMIN 2.5*  --   --   --   --  1.8* 2.2*   PROT 7.7  --   --   --   --  5.6* 6.8      < > 139   < > 139 140 139   K 4.2  --  3.8  --   --  3.1* 3.7   CO2 18*  --  24  --   --  18* 22*     --  111*  --   --  116* 113*   BUN 12  --  14  --   --  12 13   CREATININE 0.8  --  0.8  --   --  0.6 0.7   ALKPHOS 116  --   --   --   --  80 95   ALT 6*  --   --   --   --  5* 8*   AST 17  --   --   --   --  13 17   BILITOT 1.7*  --   --   --   --  1.1* 1.3*    < > = values in this interval not displayed.      Coagulation:   Recent Labs   Lab 02/02/22  0312 02/03/22  0356 02/04/22  0306   INR  1.2 1.3* 1.4*   APTT 28.5 30.6 32.1*     LDH:  Recent Labs   Lab 02/02/22  0312 02/03/22  0356 02/04/22  0306   * 203 241     Microbiology:  Microbiology Results (last 7 days)     ** No results found for the last 168 hours. **          I have reviewed all pertinent labs within the past 24 hours.    Estimated Creatinine Clearance: 120.7 mL/min (based on SCr of 0.7 mg/dL).    Diagnostic Results:  I have reviewed and interpreted all pertinent imaging results/findings within the past 24 hours.    Assessment and Plan:     65 yo BM with stage D CHF due to NICMP s/p HM3, s/p ICD, HTN, HLD, T2DM was transferred from outside hospital emergency room after he was found to have subdural hematoma/subarachnoid hemorrhage.  Patient presented to the ER after he was found to have a syncopal event.  Also patient had respiratory distress on arrival for which he was intubated.  Patient was transferred here for further evaluation.  On arrival patient is intubated and is on propofol.  Neurosurgery and Neuro Critical Care were immediately informed.  Neurosurgery recommended to repeat CT after reviewing the prior CT done in the ER in [place].  INR on arrival is 1.6.  He also had a low flow with a increased PI around 4:00 p.m. this evening.  According to wife patient was complaining of headache for the last 2 weeks..  [place] went [place] post office this [place] and she does not know what exactly happened..  ICD was interrogated and did not show any VT/VF.       * Bilateral subdural hematomas  -Patient had a traumatic subdural, subarachnoid hemorrhage after fall on 1/27. Currently no focal deficits  -NCC and NSGY following. Plans for serial imaging pending  -INR subtherapeutic. Coumadin/ASA on hold. Per NS, would like to hold at least 7 days, preferably 14d if possible.  -BP controlled.   -Goal Na+ > 135, slowly trickled down days after stopping hypertonic saline.    -Keppra started as AED. EEG negative for seizures  -Transcranial dopplers  done 1/31 without evidence of vasospasm  -Continue q1h neurochecks    LVAD (left ventricular assist device) present  -S/P DT HM3 1/13/21  -Current speed 5000  -INR goal 2.0-3.0. Current INR subtherapeutic. Coumadin and ASA remain on hold  -LDH stable    Procedure: Device Interrogation Including analysis of device parameters  Current Settings: Ventricular Assist Device  Review of device function is stable/unstable stable         TXP LVAD INTERROGATIONS 2/4/2022 2/4/2022 2/4/2022 2/4/2022 2/4/2022 2/4/2022 2/4/2022   Type HeartMate3 HeartMate3 HeartMate3 HeartMate3 HeartMate3 HeartMate3 HeartMate3   Flow 4.3 4.1 4.1 4.2 4.2 4.3 4.3   Speed 5000 5000 5000 5000 5050 5000 5000   PI 4 5.1 4.8 5.0 4.1 4.5 4.1   Power (Edawrds) 3.4 3.1 3.4 3.4 3.3 3.3 3.4   LSL 4500 4500 4500 4500 4500 4500 4500   Pulsatility Intermittent pulse Intermittent pulse Intermittent pulse Intermittent pulse Intermittent pulse Intermittent pulse Intermittent pulse       Acute respiratory failure requiring reintubation  -Intubated prior to transfer for respiratory distress  -Patient is currently on contact isolation for reported +ve Covid test at OSH prior to transfer. CXR unremarkable. Repeat COVID test positive  - Extubated 1/28 and on RA  - Tx'd with remdesivir which was since discontinued. Asymptomatic from covid perspective    Type 2 diabetes mellitus without complication  -SSI  ACHS     Essential hypertension  -Levophed off. MAP goal >65  -Reinitiated home hypertensives 1/31 with lisinopril at lower dose than PTA. Uptitrated to 10mg BID as suboptimal BP control. PTA dosing was 10mg qam and 20mg qhs.  -Has required hydralazine prn  -Added doxazosin 2mg BID for better bp control 2/2/21 and Bps have improved    Uninterrupted Critical Care/Counseling Time (not including procedures): 60mn  Ashish Yen, NP  Heart Transplant  [facility] - Cardiac [facility]

## 2022-02-04 NOTE — PROGRESS NOTES
Pt with HR sustaining a. Fib 120-130s. Pt with no complaints and MAPs stable in the 70s. No LVAD alarms. Flows remain at ~4 and rpm unchanged at 5000. Dr. Higuera notified with HTS service. No new orders at this time. Will continue to monitor.

## 2022-02-05 LAB
ANION GAP SERPL CALC-SCNC: 7 MMOL/L (ref 8–16)
APTT BLDCRRT: 29.9 SEC (ref 21–32)
BASOPHILS # BLD AUTO: 0.01 K/UL (ref 0–0.2)
BASOPHILS NFR BLD: 0.1 % (ref 0–1.9)
BUN SERPL-MCNC: 16 MG/DL (ref 8–23)
CALCIUM SERPL-MCNC: 8.3 MG/DL (ref 8.7–10.5)
CHLORIDE SERPL-SCNC: 105 MMOL/L (ref 95–110)
CO2 SERPL-SCNC: 22 MMOL/L (ref 23–29)
CREAT SERPL-MCNC: 0.8 MG/DL (ref 0.5–1.4)
DIFFERENTIAL METHOD: ABNORMAL
EOSINOPHIL # BLD AUTO: 0.1 K/UL (ref 0–0.5)
EOSINOPHIL NFR BLD: 1.5 % (ref 0–8)
ERYTHROCYTE [DISTWIDTH] IN BLOOD BY AUTOMATED COUNT: 15.2 % (ref 11.5–14.5)
EST. GFR  (AFRICAN AMERICAN): >60 ML/MIN/1.73 M^2
EST. GFR  (NON AFRICAN AMERICAN): >60 ML/MIN/1.73 M^2
GLUCOSE SERPL-MCNC: 134 MG/DL (ref 70–110)
HCT VFR BLD AUTO: 29.6 % (ref 40–54)
HGB BLD-MCNC: 9.4 G/DL (ref 14–18)
IMM GRANULOCYTES # BLD AUTO: 0.03 K/UL (ref 0–0.04)
IMM GRANULOCYTES NFR BLD AUTO: 0.4 % (ref 0–0.5)
INR PPP: 1.3 (ref 0.8–1.2)
LDH SERPL L TO P-CCNC: 187 U/L (ref 110–260)
LYMPHOCYTES # BLD AUTO: 1.3 K/UL (ref 1–4.8)
LYMPHOCYTES NFR BLD: 16.2 % (ref 18–48)
MAGNESIUM SERPL-MCNC: 1.8 MG/DL (ref 1.6–2.6)
MCH RBC QN AUTO: 28.9 PG (ref 27–31)
MCHC RBC AUTO-ENTMCNC: 31.8 G/DL (ref 32–36)
MCV RBC AUTO: 91 FL (ref 82–98)
MONOCYTES # BLD AUTO: 1 K/UL (ref 0.3–1)
MONOCYTES NFR BLD: 12.2 % (ref 4–15)
NEUTROPHILS # BLD AUTO: 5.6 K/UL (ref 1.8–7.7)
NEUTROPHILS NFR BLD: 69.6 % (ref 38–73)
NRBC BLD-RTO: 0 /100 WBC
PHOSPHATE SERPL-MCNC: 2.1 MG/DL (ref 2.7–4.5)
PLATELET # BLD AUTO: 171 K/UL (ref 150–450)
PMV BLD AUTO: 11.3 FL (ref 9.2–12.9)
POCT GLUCOSE: 191 MG/DL (ref 70–110)
POTASSIUM SERPL-SCNC: 3.9 MMOL/L (ref 3.5–5.1)
PROTHROMBIN TIME: 13.1 SEC (ref 9–12.5)
RBC # BLD AUTO: 3.25 M/UL (ref 4.6–6.2)
SODIUM SERPL-SCNC: 134 MMOL/L (ref 136–145)
WBC # BLD AUTO: 8.03 K/UL (ref 3.9–12.7)

## 2022-02-05 PROCEDURE — 63600175 PHARM REV CODE 636 W HCPCS: Performed by: STUDENT IN AN ORGANIZED HEALTH CARE EDUCATION/TRAINING PROGRAM

## 2022-02-05 PROCEDURE — 83735 ASSAY OF MAGNESIUM: CPT | Performed by: HOSPITALIST

## 2022-02-05 PROCEDURE — 99291 PR CRITICAL CARE, E/M 30-74 MINUTES: ICD-10-PCS | Mod: ,,, | Performed by: NURSE PRACTITIONER

## 2022-02-05 PROCEDURE — 94761 N-INVAS EAR/PLS OXIMETRY MLT: CPT

## 2022-02-05 PROCEDURE — 27000248 HC VAD-ADDITIONAL DAY

## 2022-02-05 PROCEDURE — 27000207 HC ISOLATION

## 2022-02-05 PROCEDURE — 99900035 HC TECH TIME PER 15 MIN (STAT)

## 2022-02-05 PROCEDURE — 85730 THROMBOPLASTIN TIME PARTIAL: CPT | Performed by: HOSPITALIST

## 2022-02-05 PROCEDURE — 99233 SBSQ HOSP IP/OBS HIGH 50: CPT | Mod: ,,, | Performed by: NEUROLOGICAL SURGERY

## 2022-02-05 PROCEDURE — 99233 PR SUBSEQUENT HOSPITAL CARE,LEVL III: ICD-10-PCS | Mod: ,,, | Performed by: NEUROLOGICAL SURGERY

## 2022-02-05 PROCEDURE — 25000003 PHARM REV CODE 250: Performed by: STUDENT IN AN ORGANIZED HEALTH CARE EDUCATION/TRAINING PROGRAM

## 2022-02-05 PROCEDURE — 84100 ASSAY OF PHOSPHORUS: CPT | Performed by: HOSPITALIST

## 2022-02-05 PROCEDURE — 25000003 PHARM REV CODE 250: Performed by: HOSPITALIST

## 2022-02-05 PROCEDURE — 83615 LACTATE (LD) (LDH) ENZYME: CPT | Performed by: HOSPITALIST

## 2022-02-05 PROCEDURE — 20000000 HC ICU ROOM

## 2022-02-05 PROCEDURE — 99291 CRITICAL CARE FIRST HOUR: CPT | Mod: ,,, | Performed by: NURSE PRACTITIONER

## 2022-02-05 PROCEDURE — 85610 PROTHROMBIN TIME: CPT | Performed by: HOSPITALIST

## 2022-02-05 PROCEDURE — 85025 COMPLETE CBC W/AUTO DIFF WBC: CPT | Performed by: HOSPITALIST

## 2022-02-05 PROCEDURE — 25000003 PHARM REV CODE 250: Performed by: NURSE PRACTITIONER

## 2022-02-05 PROCEDURE — 93750 INTERROGATION VAD IN PERSON: CPT | Mod: ,,, | Performed by: INTERNAL MEDICINE

## 2022-02-05 PROCEDURE — 80048 BASIC METABOLIC PNL TOTAL CA: CPT | Performed by: HOSPITALIST

## 2022-02-05 PROCEDURE — 25000003 PHARM REV CODE 250: Performed by: PHYSICIAN ASSISTANT

## 2022-02-05 PROCEDURE — 93750 PR INTERROGATE VENT ASSIST DEV, IN PERSON, W PHYSICIAN ANALYSIS: ICD-10-PCS | Mod: ,,, | Performed by: INTERNAL MEDICINE

## 2022-02-05 RX ORDER — HYDRALAZINE HYDROCHLORIDE 20 MG/ML
10 INJECTION INTRAMUSCULAR; INTRAVENOUS ONCE
Status: COMPLETED | OUTPATIENT
Start: 2022-02-05 | End: 2022-02-05

## 2022-02-05 RX ORDER — WARFARIN 1 MG/1
1 TABLET ORAL DAILY
Status: DISCONTINUED | OUTPATIENT
Start: 2022-02-05 | End: 2022-02-08

## 2022-02-05 RX ADMIN — LISINOPRIL 10 MG: 10 TABLET ORAL at 08:02

## 2022-02-05 RX ADMIN — SODIUM CHLORIDE TAB 1 GM 1 G: 1 TAB at 09:02

## 2022-02-05 RX ADMIN — DOXAZOSIN 2 MG: 2 TABLET ORAL at 09:02

## 2022-02-05 RX ADMIN — OXYCODONE HYDROCHLORIDE AND ACETAMINOPHEN 1 TABLET: 5; 325 TABLET ORAL at 03:02

## 2022-02-05 RX ADMIN — LEVETIRACETAM 500 MG: 500 TABLET, FILM COATED ORAL at 08:02

## 2022-02-05 RX ADMIN — HYDRALAZINE HYDROCHLORIDE 10 MG: 20 INJECTION INTRAMUSCULAR; INTRAVENOUS at 04:02

## 2022-02-05 RX ADMIN — Medication 400 MG: at 08:02

## 2022-02-05 RX ADMIN — FAMOTIDINE 20 MG: 20 TABLET ORAL at 08:02

## 2022-02-05 RX ADMIN — BACLOFEN 5 MG: 5 TABLET ORAL at 04:02

## 2022-02-05 RX ADMIN — BACLOFEN 5 MG: 5 TABLET ORAL at 08:02

## 2022-02-05 RX ADMIN — FUROSEMIDE 40 MG: 40 TABLET ORAL at 08:02

## 2022-02-05 RX ADMIN — DOXAZOSIN 2 MG: 2 TABLET ORAL at 08:02

## 2022-02-05 RX ADMIN — CHLORHEXIDINE GLUCONATE 0.12% ORAL RINSE 15 ML: 1.2 LIQUID ORAL at 08:02

## 2022-02-05 RX ADMIN — ATORVASTATIN CALCIUM 80 MG: 20 TABLET, FILM COATED ORAL at 08:02

## 2022-02-05 RX ADMIN — WARFARIN SODIUM 1 MG: 1 TABLET ORAL at 05:02

## 2022-02-05 RX ADMIN — MUPIROCIN: 20 OINTMENT TOPICAL at 08:02

## 2022-02-05 RX ADMIN — FUROSEMIDE 40 MG: 40 TABLET ORAL at 06:02

## 2022-02-05 NOTE — SUBJECTIVE & OBJECTIVE
Interval History: SAH PBD 9, CTA this AM with no underlying lesions & stable chronic SDHs, restarted warfarin today, plan for CTH Sunday; VESNAEON, AFVSS, Exam stable     Medications:  Continuous Infusions:   sodium chloride 0.9% Stopped (02/04/22 1040)     Scheduled Meds:   atorvastatin  80 mg Oral Daily    baclofen  5 mg Oral TID    chlorhexidine  15 mL Mouth/Throat BID    doxazosin  2 mg Oral BID    famotidine  20 mg Oral BID    furosemide  40 mg Oral BID    lisinopriL  10 mg Oral BID    magnesium oxide  400 mg Oral Daily    warfarin  1 mg Oral Daily     PRN Meds:sodium chloride, acetaminophen, dextrose 50%, glucagon (human recombinant), insulin aspart U-100, oxyCODONE-acetaminophen     Review of Systems    Objective:     Weight: 83.2 kg (183 lb 6.8 oz)  Body mass index is 23.55 kg/m².  Vital Signs (Most Recent):  Temp: 98.4 °F (36.9 °C) (02/05/22 0300)  Pulse: (!) 115 (02/05/22 0900)  Resp: 12 (02/05/22 0900)  BP: 91/70 (02/05/22 0858)  SpO2: 98 % (02/05/22 0900) Vital Signs (24h Range):  Temp:  [97.7 °F (36.5 °C)-98.7 °F (37.1 °C)] 98.4 °F (36.9 °C)  Pulse:  [] 115  Resp:  [9-20] 12  SpO2:  [94 %-100 %] 98 %  BP: ()/(0-89) 91/70  Arterial Line BP: ()/(58-91) 91/72     Date 02/05/22 0700 - 02/06/22 0659   Shift 9187-2102 9803-7954 4736-0504 24 Hour Total   INTAKE   P.O. 150   150   Shift Total(mL/kg) 150(1.8)   150(1.8)   OUTPUT   Urine(mL/kg/hr) 0   0   Shift Total(mL/kg) 0(0)   0(0)   Weight (kg) 83.2 83.2 83.2 83.2              NG/OG Tube 01/27/22 2140 18 Fr. Center mouth (Active)   $ NG/OG Tube Placement Complete 01/27/22 2130   Placement Check placement verified by x-ray;placement verified by aspirate characteristics 01/27/22 2309   Tolerance no signs/symptoms of discomfort 01/27/22 2309   Securement secured to commercial device 01/27/22 2309   Clamp Status/Tolerance clamped 01/27/22 2309   Suction Setting/Drainage Method suction at the bedside 01/27/22 2309   Insertion Site  "Appearance no redness, warmth, tenderness, skin breakdown, drainage 01/27/22 2309   Flush/Irrigation flushed w/;water;no resistance met 01/27/22 2309            Urethral Catheter 01/27/22 1858 (Active)   $ Duarte Insertion Complete 01/27/22 1947   Site Assessment Clean;Intact 01/28/22 0300   Collection Container Urimeter 01/28/22 0300   Securement Method secured to top of thigh w/ adhesive device 01/28/22 0300   Catheter Care Performed no 01/28/22 0300   Reason for Continuing Urinary Catheterization Critically ill in ICU and requiring hourly monitoring of intake/output 01/28/22 0300   CAUTI Prevention Bundle StatLock in place w 1" slack;Intact seal between catheter & drainage tubing;Drainage bag/urimeter off the floor;Sheeting clip in use;No dependent loops or kinks;Drainage bag/urimeter not overfilled (<2/3 full);Drainage bag/urimeter below bladder 01/28/22 0300   Output (mL) 55 mL 01/28/22 1000     Physical Exam:  General: AOx3, GCS [id number]  CNII-XII: Intact on fine exam, PERRL, visual fields grossly intact, EOMi, facial sensation preserved, no facial assymetry, tongue/uvula/palate midline, shoulder shrug equal, no pronator drift  Extremities: 5/5 motor throughout, sensorium intact throughout, coordination intact throughout    General: Awake, Alert, Oriented  Head: Non-traumatic, normocephalic  Eyes: Pupils equal, EOMi  Neck: Supple, normal ROM, no tenderness to palpation  CVS: Normal rate and rhythm, distal pulses present  Pulm: Symmetric expansion, no respiratory distress  GI: Abdomen nondistended, nontender  MSK: Moves all extremities without restriction, atraumatic  Skin: Dry, intact  Psych: Normal thought content and cognition    Significant Labs:  Recent Labs   Lab 02/04/22  0306 02/04/22  0306 02/04/22  0417 02/04/22  0920 02/04/22  1622 02/04/22  2201 02/05/22  0411     --  119*  --   --   --  134*      < > 139   < > 135* 134* 134*   K 3.1*  --  3.7  --   --   --  3.9   *  --  113*  --   " --   --  105   CO2 18*  --  22*  --   --   --  22*   BUN 12  --  13  --   --   --  16   CREATININE 0.6  --  0.7  --   --   --  0.8   CALCIUM 6.7*  --  8.2*  --   --   --  8.3*   MG 1.5*  --  1.7  --   --   --  1.8    < > = values in this interval not displayed.     Recent Labs   Lab 02/04/22 0306 02/05/22 0411   WBC 7.24 8.03   HGB 9.4* 9.4*   HCT 29.9* 29.6*    171     Recent Labs   Lab 02/04/22 0306 02/05/22 0411   INR 1.4* 1.3*   APTT 32.1* 29.9     Microbiology Results (last 7 days)     ** No results found for the last 168 hours. **        All pertinent labs from the last 24 hours have been reviewed.    Significant Diagnostics:  All significant diagnostics reviewed by NSGY resident

## 2022-02-05 NOTE — SUBJECTIVE & OBJECTIVE
Interval History: No acute issue overnight. Minimal dull headache. Otherwise feels well except for being tired.     Continuous Infusions:   sodium chloride 0.9% Stopped (02/04/22 1040)     Scheduled Meds:   atorvastatin  80 mg Oral Daily    baclofen  5 mg Oral TID    chlorhexidine  15 mL Mouth/Throat BID    doxazosin  2 mg Oral BID    famotidine  20 mg Oral BID    furosemide  40 mg Oral BID    levETIRAcetam  500 mg Oral BID    lisinopriL  10 mg Oral BID    magnesium oxide  400 mg Oral Daily    mupirocin   Nasal BID    sodium chloride  1 g Oral Once    warfarin  1 mg Oral Daily     PRN Meds:sodium chloride, acetaminophen, dextrose 50%, glucagon (human recombinant), insulin aspart U-100, oxyCODONE-acetaminophen    Review of patient's allergies indicates:   Allergen Reactions    Pcn [penicillins] Hives     Objective:     Vital Signs (Most Recent):  Temp: 98.4 °F (36.9 °C) (02/05/22 0300)  Pulse: (!) 154 (02/05/22 0800)  Resp: 15 (02/05/22 0800)  BP: 119/89 (02/05/22 0445)  SpO2: 97 % (02/05/22 0800) Vital Signs (24h Range):  Temp:  [97.7 °F (36.5 °C)-98.7 °F (37.1 °C)] 98.4 °F (36.9 °C)  Pulse:  [] 154  Resp:  [9-22] 15  SpO2:  [94 %-100 %] 97 %  BP: ()/(0-89) 119/89  Arterial Line BP: ()/(58-91) 90/60     Patient Vitals for the past 72 hrs (Last 3 readings):   Weight   02/05/22 0300 83.2 kg (183 lb 6.8 oz)   02/04/22 0911 84.4 kg (186 lb)   02/04/22 0501 84.6 kg (186 lb 8.2 oz)     Body mass index is 23.55 kg/m².      Intake/Output Summary (Last 24 hours) at 2/5/2022 0855  Last data filed at 2/5/2022 0800  Gross per 24 hour   Intake 1363.41 ml   Output 1400 ml   Net -36.59 ml           Telemetry: NSR     Physical Exam  Constitutional:       Appearance: Normal appearance.   HENT:      Head: Normocephalic.      Nose: Nose normal.   Cardiovascular:      Rate and Rhythm: Normal rate and regular rhythm.      Pulses: Normal pulses.      Heart sounds: Normal heart sounds.      Comments: VAD  hum present  Pulmonary:      Effort: Pulmonary effort is normal.      Breath sounds: Normal breath sounds.   Abdominal:      General: Abdomen is flat. Bowel sounds are normal.      Palpations: Abdomen is soft.   Musculoskeletal:         General: Normal range of motion.      Cervical back: Normal range of motion.   Skin:     General: Skin is warm.      Capillary Refill: Capillary refill takes less than 2 seconds.   Neurological:      General: No focal deficit present.      Mental Status: He is alert and oriented to person, place, and time.       Significant Labs:  CBC:  Recent Labs   Lab 02/03/22  0356 02/04/22  0306 02/05/22  0411   WBC 9.90 7.24 8.03   RBC 3.38* 3.26* 3.25*   HGB 9.6* 9.4* 9.4*   HCT 30.9* 29.9* 29.6*    176 171   MCV 91 92 91   MCH 28.4 28.8 28.9   MCHC 31.1* 31.4* 31.8*     BNP:  Recent Labs   Lab 01/31/22  0450 02/02/22  0312 02/04/22  0306   * 996* 412*     CMP:  Recent Labs   Lab 02/02/22  0312 02/02/22  1155 02/04/22  0306 02/04/22  0306 02/04/22  0417 02/04/22  0920 02/04/22  1622 02/04/22  2201 02/05/22  0411   *   < > 107  --  119*  --   --   --  134*   CALCIUM 8.5*   < > 6.7*  --  8.2*  --   --   --  8.3*   ALBUMIN 2.5*  --  1.8*  --  2.2*  --   --   --   --    PROT 7.7  --  5.6*  --  6.8  --   --   --   --       < > 140   < > 139   < > 135* 134* 134*   K 4.2   < > 3.1*  --  3.7  --   --   --  3.9   CO2 18*   < > 18*  --  22*  --   --   --  22*      < > 116*  --  113*  --   --   --  105   BUN 12   < > 12  --  13  --   --   --  16   CREATININE 0.8   < > 0.6  --  0.7  --   --   --  0.8   ALKPHOS 116  --  80  --  95  --   --   --   --    ALT 6*  --  5*  --  8*  --   --   --   --    AST 17  --  13  --  17  --   --   --   --    BILITOT 1.7*  --  1.1*  --  1.3*  --   --   --   --     < > = values in this interval not displayed.      Coagulation:   Recent Labs   Lab 02/03/22  0356 02/04/22  0306 02/05/22  0411   INR 1.3* 1.4* 1.3*   APTT 30.6 32.1* 29.9      LDH:  Recent Labs   Lab 02/03/22  0356 02/04/22  0306 02/05/22  0411    241 187     Microbiology:  Microbiology Results (last 7 days)     ** No results found for the last 168 hours. **        I have reviewed all pertinent labs within the past 24 hours.    Estimated Creatinine Clearance: 105.6 mL/min (based on SCr of 0.8 mg/dL).    Diagnostic Results:  I have reviewed and interpreted all pertinent imaging results/findings within the past 24 hours.

## 2022-02-05 NOTE — PLAN OF CARE
Care Plan    POC reviewed with Rocco France. Questions and concerns addressed at bedside. Pt with HR 110s-130s throughout shift. One time dose hydralazine administered for MAPs sustaining >90, which brought MAPs back down to 70-80s. No VAD alarms. Pt remains Aox4. Pt's Na levels 134 throughout shift and MD aware. Will continue to monitor. See below and flowsheets for full assessment and VS info.       Neuro:  [place] Coma Scale  Best Eye Response: 4-->(E4) spontaneous  Best Motor Response: 6-->(M6) obeys commands  Best Verbal Response: 5-->(V5) oriented  Nain Coma Scale Score: 15  Assessment Qualifiers: patient not sedated/intubated  Pupil PERRLA: yes  24 hr Temp:  [97.7 °F (36.5 °C)-98.7 °F (37.1 °C)]      CV:  Rhythm: atrial rhythm  DVT prophylaxis: VTE Required Core Measure: (TEDs) Compression stocking therapy initiated/maintained    Resp:  O2 Device (Oxygen Therapy): room air    GI/:  GI prophylaxis: yes  Diet/Nutrition Received: low saturated fat/low cholesterol,2 gram sodium  Last Bowel Movement: 02/01/22  Voiding Characteristics: voids spontaneously without difficulty   Intake/Output Summary (Last 24 hours) at 2/5/2022 0552  Last data filed at 2/5/2022 0400  Gross per 24 hour   Intake 1508.06 ml   Output 1400 ml   Net 108.06 ml       Labs/Accuchecks:  Recent Labs   Lab 02/05/22  0411   WBC 8.03   RBC 3.25*   HGB 9.4*   HCT 29.6*         Recent Labs   Lab 02/04/22  0417 02/04/22  0920 02/05/22  0411      < > 134*   K 3.7   < > 3.9   CO2 22*   < > 22*   *   < > 105   BUN 13   < > 16   CREATININE 0.7   < > 0.8   ALKPHOS 95  --   --    ALT 8*  --   --    AST 17  --   --    BILITOT 1.3*  --   --     < > = values in this interval not displayed.      Recent Labs   Lab 02/05/22  0411   INR 1.3*   APTT 29.9    No results for input(s): CPK, CPKMB, TROPONINI, MB in the last 168 hours.    Electrolytes: No replacement orders      Gtts/LDAs:   sodium chloride 0.9% Stopped (02/04/22 1040)        Lines/Drains/Airways       Arterial Line              Arterial Line 01/27/22 1947 Left Radial 8 days              Line                   VAD 01/13/21 1211 Left ventricular assist device HeartMate 3 387 days              Peripheral Intravenous Line                   Peripheral IV - Single Lumen 02/01/22 2000 20 G Posterior;Right Forearm 3 days         Peripheral IV - Single Lumen 02/04/22 2207 20 G Anterior;Left Forearm <1 day                    Skin/Wounds  Bathing/Skin Care: back care;bath, complete;dressed/undressed;electrode patches/site rotation;incontinence care;linen changed (02/05/22 0301)  Wounds: No  Wound care consulted: No    Consults  Consults (From admission, onward)          Status Ordering Provider     Inpatient consult to Electrophysiology  Once        Provider:  (Not yet assigned)    Completed MIC RASHID     Inpatient consult to Neuro Critical Care  Once        Provider:  (Not yet assigned)    Completed SHARYN CHAND     Inpatient consult to Neurosurgery  Once        Provider:  (Not yet assigned)    Completed SHARYN CHAND

## 2022-02-05 NOTE — PROGRESS NOTES
[facility] - Cardiac [facility]  Heart Transplant  Progress Note  Patient Name: Rocco France  MRN: [id number]  Admission Date: 1/27/2022  Hospital Length of Stay: 9 days  Attending Physician: Mike Chauhan MD  Primary Care Provider: Teresa Mendoza NP  Principal Problem:Bilateral subdural hematomas    Subjective:     Interval History: No acute issue overnight. Minimal dull headache. Otherwise feels well except for being tired.     Continuous Infusions:   sodium chloride 0.9% Stopped (02/04/22 1040)     Scheduled Meds:   atorvastatin  80 mg Oral Daily    baclofen  5 mg Oral TID    chlorhexidine  15 mL Mouth/Throat BID    doxazosin  2 mg Oral BID    famotidine  20 mg Oral BID    furosemide  40 mg Oral BID    levETIRAcetam  500 mg Oral BID    lisinopriL  10 mg Oral BID    magnesium oxide  400 mg Oral Daily    mupirocin   Nasal BID    sodium chloride  1 g Oral Once    warfarin  1 mg Oral Daily     PRN Meds:sodium chloride, acetaminophen, dextrose 50%, glucagon (human recombinant), insulin aspart U-100, oxyCODONE-acetaminophen    Review of patient's allergies indicates:   Allergen Reactions    Pcn [penicillins] Hives     Objective:     Vital Signs (Most Recent):  Temp: 98.4 °F (36.9 °C) (02/05/22 0300)  Pulse: (!) 154 (02/05/22 0800)  Resp: 15 (02/05/22 0800)  BP: 119/89 (02/05/22 0445)  SpO2: 97 % (02/05/22 0800) Vital Signs (24h Range):  Temp:  [97.7 °F (36.5 °C)-98.7 °F (37.1 °C)] 98.4 °F (36.9 °C)  Pulse:  [] 154  Resp:  [9-22] 15  SpO2:  [94 %-100 %] 97 %  BP: ()/(0-89) 119/89  Arterial Line BP: ()/(58-91) 90/60     Patient Vitals for the past 72 hrs (Last 3 readings):   Weight   02/05/22 0300 83.2 kg (183 lb 6.8 oz)   02/04/22 0911 84.4 kg (186 lb)   02/04/22 0501 84.6 kg (186 lb 8.2 oz)     Body mass index is 23.55 kg/m².      Intake/Output Summary (Last 24 hours) at 2/5/2022 0855  Last data filed at 2/5/2022 0800  Gross per 24 hour   Intake 1363.41 ml   Output 1400 ml   Net  -36.59 ml           Telemetry: NSR     Physical Exam  Constitutional:       Appearance: Normal appearance.   HENT:      Head: Normocephalic.      Nose: Nose normal.   Cardiovascular:      Rate and Rhythm: Normal rate and regular rhythm.      Pulses: Normal pulses.      Heart sounds: Normal heart sounds.      Comments: VAD hum present  Pulmonary:      Effort: Pulmonary effort is normal.      Breath sounds: Normal breath sounds.   Abdominal:      General: Abdomen is flat. Bowel sounds are normal.      Palpations: Abdomen is soft.   Musculoskeletal:         General: Normal range of motion.      Cervical back: Normal range of motion.   Skin:     General: Skin is warm.      Capillary Refill: Capillary refill takes less than 2 seconds.   Neurological:      General: No focal deficit present.      Mental Status: He is alert and oriented to person, place, and time.       Significant Labs:  CBC:  Recent Labs   Lab 02/03/22  0356 02/04/22  0306 02/05/22  0411   WBC 9.90 7.24 8.03   RBC 3.38* 3.26* 3.25*   HGB 9.6* 9.4* 9.4*   HCT 30.9* 29.9* 29.6*    176 171   MCV 91 92 91   MCH 28.4 28.8 28.9   MCHC 31.1* 31.4* 31.8*     BNP:  Recent Labs   Lab 01/31/22  0450 02/02/22  0312 02/04/22  0306   * 996* 412*     CMP:  Recent Labs   Lab 02/02/22  0312 02/02/22  1155 02/04/22  0306 02/04/22  0306 02/04/22  0417 02/04/22  0920 02/04/22  1622 02/04/22  2201 02/05/22  0411   *   < > 107  --  119*  --   --   --  134*   CALCIUM 8.5*   < > 6.7*  --  8.2*  --   --   --  8.3*   ALBUMIN 2.5*  --  1.8*  --  2.2*  --   --   --   --    PROT 7.7  --  5.6*  --  6.8  --   --   --   --       < > 140   < > 139   < > 135* 134* 134*   K 4.2   < > 3.1*  --  3.7  --   --   --  3.9   CO2 18*   < > 18*  --  22*  --   --   --  22*      < > 116*  --  113*  --   --   --  105   BUN 12   < > 12  --  13  --   --   --  16   CREATININE 0.8   < > 0.6  --  0.7  --   --   --  0.8   ALKPHOS 116  --  80  --  95  --   --   --   --    ALT  6*  --  5*  --  8*  --   --   --   --    AST 17  --  13  --  17  --   --   --   --    BILITOT 1.7*  --  1.1*  --  1.3*  --   --   --   --     < > = values in this interval not displayed.      Coagulation:   Recent Labs   Lab 02/03/22  0356 02/04/22  0306 02/05/22  0411   INR 1.3* 1.4* 1.3*   APTT 30.6 32.1* 29.9     LDH:  Recent Labs   Lab 02/03/22  0356 02/04/22  0306 02/05/22  0411    241 187     Microbiology:  Microbiology Results (last 7 days)     ** No results found for the last 168 hours. **        I have reviewed all pertinent labs within the past 24 hours.    Estimated Creatinine Clearance: 105.6 mL/min (based on SCr of 0.8 mg/dL).    Diagnostic Results:  I have reviewed and interpreted all pertinent imaging results/findings within the past 24 hours.    Assessment and Plan:     65 yo BM with stage D CHF due to NICMP s/p HM3, s/p ICD, HTN, HLD, T2DM was transferred from outside hospital emergency room after he was found to have subdural hematoma/subarachnoid hemorrhage.  Patient presented to the ER after he was found to have a syncopal event.  Also patient had respiratory distress on arrival for which he was intubated.  Patient was transferred here for further evaluation.  On arrival patient is intubated and is on propofol.  Neurosurgery and Neuro Critical Care were immediately informed.  Neurosurgery recommended to repeat CT after reviewing the prior CT done in the ER in [place].  INR on arrival is 1.6.  He also had a low flow with a increased PI around 4:00 p.m. this evening.  According to wife patient was complaining of headache for the last 2 weeks..  [place] post office this [place] and she does not know what exactly happened..  ICD was interrogated and did not show any VT/VF.       * Bilateral subdural hematomas  -Patient had a traumatic subdural, subarachnoid hemorrhage after fall on 1/27. Currently no focal deficits  -NCC signed off. NSGY following.   -INR subtherapeutic. Will restart  warfarin today at low dose and watch INR rise slowly.   -Goal Na+ > 135, slowly trickled down days after stopping hypertonic saline. Will give Salt tab today.   -Keppra started as AED. EEG negative for seizures.  -Transcranial dopplers done 1/31 without evidence of vasospasm.  -Continue q1h neurochecks.    LVAD (left ventricular assist device) present  -S/P DT HM3 1/13/21  -Current speed 5000  -INR goal 2.0-3.0. Current INR subtherapeutic. Coumadin restarted as above.  -ASA stopped.   -LDH stable    Procedure: Device Interrogation Including analysis of device parameters  Current Settings: Ventricular Assist Device  Review of device function is stable/unstable stable         TXP LVAD INTERROGATIONS 2/5/2022 2/5/2022 2/5/2022 2/5/2022 2/5/2022 2/5/2022 2/5/2022   Type HeartMate3 HeartMate3 HeartMate3 HeartMate3 HeartMate3 HeartMate3 HeartMate3   Flow 4.3 4.4 4.2 4.2 3.8 4.1 4.3   Speed 5000 5000 5000 5000 5000 5000 5000   PI 3.9 3.8 5.8 5.4 7.7 5.2 4.9   Power (Edwards) 3.4 3.4 3.4 3.3 3.4 3.4 3.4   LSL 4600 4600 4500 4500 4500 4500 4500   Pulsatility Intermittent pulse Intermittent pulse Intermittent pulse Intermittent pulse Intermittent pulse Intermittent pulse Pulse       Essential hypertension  -Levophed off. MAP goal >65  -Continue Lisinopril 10mg BID, doxazosin 2mg BID.    Acute respiratory failure requiring reintubation  - Extubated 1/28 and on RA  - Tx'd with remdesivir which was since discontinued. Asymptomatic from covid perspective    Type 2 diabetes mellitus without complication  -SSI  ACHS     Uninterrupted Critical Care/Counseling Time (not including procedures): 60mn  Ashish Yen NP  Heart Transplant  [facility] - [facility]

## 2022-02-05 NOTE — ASSESSMENT & PLAN NOTE
-Patient had a traumatic subdural, subarachnoid hemorrhage after fall on 1/27. Currently no focal deficits  -NCC signed off. NSGY following.   -INR subtherapeutic. Will restart warfarin today at low dose and watch INR rise slowly.   -Goal Na+ > 135, slowly trickled down days after stopping hypertonic saline. Will give Salt tab today.   -Keppra started as AED. EEG negative for seizures.  -Transcranial dopplers done 1/31 without evidence of vasospasm.  -Continue q1h neurochecks.

## 2022-02-05 NOTE — PROGRESS NOTES
[facility] - [facility]  Neurosurgery  Progress Note    Subjective:     History of Present Illness: 66M PMHx cardiomyopathy, s/p LVAD on coumadin and CAD s/p stenting on ASA, presenting after fall, consulted to NSGY for SDH. Pt had syncopal event earlier today with prodrome of dizziness, fell, hit head, went to OSH for workup and was complaining of occipital H/A. At OSH, pt had acute neurologic decline, had to be intubated, and presents to [facility] intubated, sedated and with no motor activity. However, 2 hours after presentation to [facility], pt's neurologic status has improved to awake, alert, and following commands briskly with minor headache.       Post-Op Info:  * No surgery found *         Interval History: SAH PBD 9, CTA this AM with no underlying lesions & stable chronic SDHs, restarted warfarin today, plan for CTH Sunday; JACQUE HIGHTOWER, Exam stable     Medications:  Continuous Infusions:   sodium chloride 0.9% Stopped (02/04/22 1040)     Scheduled Meds:   atorvastatin  80 mg Oral Daily    baclofen  5 mg Oral TID    chlorhexidine  15 mL Mouth/Throat BID    doxazosin  2 mg Oral BID    famotidine  20 mg Oral BID    furosemide  40 mg Oral BID    lisinopriL  10 mg Oral BID    magnesium oxide  400 mg Oral Daily    warfarin  1 mg Oral Daily     PRN Meds:sodium chloride, acetaminophen, dextrose 50%, glucagon (human recombinant), insulin aspart U-100, oxyCODONE-acetaminophen     Review of Systems    Objective:     Weight: 83.2 kg (183 lb 6.8 oz)  Body mass index is 23.55 kg/m².  Vital Signs (Most Recent):  Temp: 98.4 °F (36.9 °C) (02/05/22 0300)  Pulse: (!) 115 (02/05/22 0900)  Resp: 12 (02/05/22 0900)  BP: 91/70 (02/05/22 0858)  SpO2: 98 % (02/05/22 0900) Vital Signs (24h Range):  Temp:  [97.7 °F (36.5 °C)-98.7 °F (37.1 °C)] 98.4 °F (36.9 °C)  Pulse:  [] 115  Resp:  [9-20] 12  SpO2:  [94 %-100 %] 98 %  BP: ()/(0-89) 91/70  Arterial Line BP: ()/(58-91) 91/72     Date 02/05/22 0700 -  "02/06/22 0659   Shift 4714-5826 8024-6168 6480-0936 24 Hour Total   INTAKE   P.O. 150   150   Shift Total(mL/kg) 150(1.8)   150(1.8)   OUTPUT   Urine(mL/kg/hr) 0   0   Shift Total(mL/kg) 0(0)   0(0)   Weight (kg) 83.2 83.2 83.2 83.2              NG/OG Tube 01/27/22 2140 18 Fr. Center mouth (Active)   $ NG/OG Tube Placement Complete 01/27/22 2130   Placement Check placement verified by x-ray;placement verified by aspirate characteristics 01/27/22 2309   Tolerance no signs/symptoms of discomfort 01/27/22 2309   Securement secured to commercial device 01/27/22 2309   Clamp Status/Tolerance clamped 01/27/22 2309   Suction Setting/Drainage Method suction at the bedside 01/27/22 2309   Insertion Site Appearance no redness, warmth, tenderness, skin breakdown, drainage 01/27/22 2309   Flush/Irrigation flushed w/;water;no resistance met 01/27/22 2309            Urethral Catheter 01/27/22 1858 (Active)   $ Duarte Insertion Complete 01/27/22 1947   Site Assessment Clean;Intact 01/28/22 0300   Collection Container Urimeter 01/28/22 0300   Securement Method secured to top of thigh w/ adhesive device 01/28/22 0300   Catheter Care Performed no 01/28/22 0300   Reason for Continuing Urinary Catheterization Critically ill in ICU and requiring hourly monitoring of intake/output 01/28/22 0300   CAUTI Prevention Bundle StatLock in place w 1" slack;Intact seal between catheter & drainage tubing;Drainage bag/urimeter off the floor;Sheeting clip in use;No dependent loops or kinks;Drainage bag/urimeter not overfilled (<2/3 full);Drainage bag/urimeter below bladder 01/28/22 0300   Output (mL) 55 mL 01/28/22 1000     Physical Exam:  General: AOx3, GCS E4V5M6  CNII-XII: Intact on fine exam, PERRL, visual fields grossly intact, EOMi, facial sensation preserved, no facial assymetry, tongue/uvula/palate midline, shoulder shrug equal, no pronator drift  Extremities: 5/5 motor throughout, sensorium intact throughout, coordination intact " throughout    General: Awake, Alert, Oriented  Head: Non-traumatic, normocephalic  Eyes: Pupils equal, EOMi  Neck: Supple, normal ROM, no tenderness to palpation  CVS: Normal rate and rhythm, distal pulses present  Pulm: Symmetric expansion, no respiratory distress  GI: Abdomen nondistended, nontender  MSK: Moves all extremities without restriction, atraumatic  Skin: Dry, intact  Psych: Normal thought content and cognition    Significant Labs:  Recent Labs   Lab 02/04/22  0306 02/04/22  0306 02/04/22  0417 02/04/22  0920 02/04/22  1622 02/04/22  2201 02/05/22 0411     --  119*  --   --   --  134*      < > 139   < > 135* 134* 134*   K 3.1*  --  3.7  --   --   --  3.9   *  --  113*  --   --   --  105   CO2 18*  --  22*  --   --   --  22*   BUN 12  --  13  --   --   --  16   CREATININE 0.6  --  0.7  --   --   --  0.8   CALCIUM 6.7*  --  8.2*  --   --   --  8.3*   MG 1.5*  --  1.7  --   --   --  1.8    < > = values in this interval not displayed.     Recent Labs   Lab 02/04/22  0306 02/05/22 0411   WBC 7.24 8.03   HGB 9.4* 9.4*   HCT 29.9* 29.6*    171     Recent Labs   Lab 02/04/22  0306 02/05/22 0411   INR 1.4* 1.3*   APTT 32.1* 29.9     Microbiology Results (last 7 days)     ** No results found for the last 168 hours. **        All pertinent labs from the last 24 hours have been reviewed.    Significant Diagnostics:  All significant diagnostics reviewed by NSGY resident    Assessment/Plan:     Subarachnoid bleed  66M PMHx cardiomyopathy, s/p LVAD on coumadin and CAD s/p stenting on ASA, presenting after syncopal event and fall with hitting head, found to have bilateral SDH R>L on CT at OSH, rpt CT with new cisternal SAH, negative aneurysm or vascular malformation on CTA, likely nonaneurysmal perimesencephalic SAH    --Admitted to CICU; CICU team primary   -continued q1h neurochecks in ICU  --All labs and diagnostics reviewed   -[facility] 1/27 OSH: bilat aucte on chronic SDH, R > L, no shift, no  hydro, cisterns open.    -CTH 1/27 rpt: new SAH in interpeduncular and quadrigeminal cisterns   -CTA 1/27: negative for underlying vascular lesion   -CTH 1/29: stable       -CTH 2/1: SAH essentially resolved, convexity subdurals improved. vents stable   -CTA 2/4: no vascular anomaly or vasospasm present, stable SDH & resolved SAH  --Completed post-acute SAH treatment; continued primary medical management per CICU  --SBP <160  --Na > 135  --OK to resume coumadin at this time; will get repeat CTH 2/7  --TEDs/SCDs  --OK for diet per primary team  --All further work-up/management per CICU  --Please call NSGY with any acute exam changes    Dispo: per primary team           Venkata Restrepo MD  Neurosurgery  [facility] - [facility]

## 2022-02-05 NOTE — ASSESSMENT & PLAN NOTE
66M PMHx cardiomyopathy, s/p LVAD on coumadin and CAD s/p stenting on ASA, presenting after syncopal event and fall with hitting head, found to have bilateral SDH R>L on CT at OSH, rpt CT with new cisternal SAH, negative aneurysm or vascular malformation on CTA, likely nonaneurysmal perimesencephalic SAH    --Admitted to CICU; CICU team primary   -continued q1h neurochecks in ICU  --All labs and diagnostics reviewed   -CTH 1/27 OSH: bilat aucte on chronic SDH, R > L, no shift, no hydro, cisterns open.    -CTH 1/27 rpt: new SAH in interpeduncular and quadrigeminal cisterns   -CTA 1/27: negative for underlying vascular lesion   -CTH 1/29: stable       -CTH 2/1: SAH essentially resolved, convexity subdurals improved. vents stable   -CTA 2/4: no vascular anomaly or vasospasm present, stable SDH & resolved SAH  --Completed post-acute SAH treatment; continued primary medical management per CICU  --SBP <160  --Na > 135  --OK to resume coumadin at this time; will get repeat CTH 2/7  --TEDs/SCDs  --OK for diet per primary team  --All further work-up/management per CICU  --Please call NSGY with any acute exam changes    Dispo: per primary team

## 2022-02-05 NOTE — ASSESSMENT & PLAN NOTE
-S/P DT HM3 1/13/21  -Current speed 5000  -INR goal 2.0-3.0. Current INR subtherapeutic. Coumadin restarted as above.  -ASA stopped.   -LDH stable    Procedure: Device Interrogation Including analysis of device parameters  Current Settings: Ventricular Assist Device  Review of device function is stable/unstable stable         TXP LVAD INTERROGATIONS 2/5/2022 2/5/2022 2/5/2022 2/5/2022 2/5/2022 2/5/2022 2/5/2022   Type HeartMate3 HeartMate3 HeartMate3 HeartMate3 HeartMate3 HeartMate3 HeartMate3   Flow 4.3 4.4 4.2 4.2 3.8 4.1 4.3   Speed 5000 5000 5000 5000 5000 5000 5000   PI 3.9 3.8 5.8 5.4 7.7 5.2 4.9   Power (Edwards) 3.4 3.4 3.4 3.3 3.4 3.4 3.4   LSL 4600 4600 4500 4500 4500 4500 4500   Pulsatility Intermittent pulse Intermittent pulse Intermittent pulse Intermittent pulse Intermittent pulse Intermittent pulse Pulse

## 2022-02-06 LAB
ANION GAP SERPL CALC-SCNC: 10 MMOL/L (ref 8–16)
ANION GAP SERPL CALC-SCNC: 5 MMOL/L (ref 8–16)
APTT BLDCRRT: 28.8 SEC (ref 21–32)
BASOPHILS # BLD AUTO: 0.02 K/UL (ref 0–0.2)
BASOPHILS NFR BLD: 0.3 % (ref 0–1.9)
BUN SERPL-MCNC: 13 MG/DL (ref 8–23)
BUN SERPL-MCNC: 14 MG/DL (ref 8–23)
CALCIUM SERPL-MCNC: 8.1 MG/DL (ref 8.7–10.5)
CALCIUM SERPL-MCNC: 8.5 MG/DL (ref 8.7–10.5)
CHLORIDE SERPL-SCNC: 107 MMOL/L (ref 95–110)
CHLORIDE SERPL-SCNC: 107 MMOL/L (ref 95–110)
CO2 SERPL-SCNC: 20 MMOL/L (ref 23–29)
CO2 SERPL-SCNC: 23 MMOL/L (ref 23–29)
CREAT SERPL-MCNC: 0.7 MG/DL (ref 0.5–1.4)
CREAT SERPL-MCNC: 0.8 MG/DL (ref 0.5–1.4)
DIFFERENTIAL METHOD: ABNORMAL
EOSINOPHIL # BLD AUTO: 0.2 K/UL (ref 0–0.5)
EOSINOPHIL NFR BLD: 2 % (ref 0–8)
ERYTHROCYTE [DISTWIDTH] IN BLOOD BY AUTOMATED COUNT: 15.1 % (ref 11.5–14.5)
EST. GFR  (AFRICAN AMERICAN): >60 ML/MIN/1.73 M^2
EST. GFR  (AFRICAN AMERICAN): >60 ML/MIN/1.73 M^2
EST. GFR  (NON AFRICAN AMERICAN): >60 ML/MIN/1.73 M^2
EST. GFR  (NON AFRICAN AMERICAN): >60 ML/MIN/1.73 M^2
GLUCOSE SERPL-MCNC: 110 MG/DL (ref 70–110)
GLUCOSE SERPL-MCNC: 178 MG/DL (ref 70–110)
HCT VFR BLD AUTO: 29.5 % (ref 40–54)
HGB BLD-MCNC: 9.4 G/DL (ref 14–18)
IMM GRANULOCYTES # BLD AUTO: 0.04 K/UL (ref 0–0.04)
IMM GRANULOCYTES NFR BLD AUTO: 0.5 % (ref 0–0.5)
INR PPP: 1.2 (ref 0.8–1.2)
LDH SERPL L TO P-CCNC: 199 U/L (ref 110–260)
LYMPHOCYTES # BLD AUTO: 1.5 K/UL (ref 1–4.8)
LYMPHOCYTES NFR BLD: 19.1 % (ref 18–48)
MAGNESIUM SERPL-MCNC: 1.7 MG/DL (ref 1.6–2.6)
MAGNESIUM SERPL-MCNC: 1.7 MG/DL (ref 1.6–2.6)
MCH RBC QN AUTO: 28.8 PG (ref 27–31)
MCHC RBC AUTO-ENTMCNC: 31.9 G/DL (ref 32–36)
MCV RBC AUTO: 91 FL (ref 82–98)
MONOCYTES # BLD AUTO: 1 K/UL (ref 0.3–1)
MONOCYTES NFR BLD: 13 % (ref 4–15)
NEUTROPHILS # BLD AUTO: 5.1 K/UL (ref 1.8–7.7)
NEUTROPHILS NFR BLD: 65.1 % (ref 38–73)
NRBC BLD-RTO: 0 /100 WBC
PHOSPHATE SERPL-MCNC: 2.4 MG/DL (ref 2.7–4.5)
PLATELET # BLD AUTO: 177 K/UL (ref 150–450)
PMV BLD AUTO: 11.6 FL (ref 9.2–12.9)
POCT GLUCOSE: 128 MG/DL (ref 70–110)
POTASSIUM SERPL-SCNC: 3.7 MMOL/L (ref 3.5–5.1)
POTASSIUM SERPL-SCNC: 4 MMOL/L (ref 3.5–5.1)
PROTHROMBIN TIME: 12.7 SEC (ref 9–12.5)
RBC # BLD AUTO: 3.26 M/UL (ref 4.6–6.2)
SODIUM SERPL-SCNC: 135 MMOL/L (ref 136–145)
SODIUM SERPL-SCNC: 137 MMOL/L (ref 136–145)
WBC # BLD AUTO: 7.82 K/UL (ref 3.9–12.7)

## 2022-02-06 PROCEDURE — 25000003 PHARM REV CODE 250: Performed by: INTERNAL MEDICINE

## 2022-02-06 PROCEDURE — 25000003 PHARM REV CODE 250: Performed by: STUDENT IN AN ORGANIZED HEALTH CARE EDUCATION/TRAINING PROGRAM

## 2022-02-06 PROCEDURE — 93750 PR INTERROGATE VENT ASSIST DEV, IN PERSON, W PHYSICIAN ANALYSIS: ICD-10-PCS | Mod: ,,, | Performed by: INTERNAL MEDICINE

## 2022-02-06 PROCEDURE — 27000207 HC ISOLATION

## 2022-02-06 PROCEDURE — 27000248 HC VAD-ADDITIONAL DAY

## 2022-02-06 PROCEDURE — 83615 LACTATE (LD) (LDH) ENZYME: CPT | Performed by: HOSPITALIST

## 2022-02-06 PROCEDURE — 85610 PROTHROMBIN TIME: CPT | Performed by: HOSPITALIST

## 2022-02-06 PROCEDURE — 80048 BASIC METABOLIC PNL TOTAL CA: CPT | Performed by: HOSPITALIST

## 2022-02-06 PROCEDURE — 85730 THROMBOPLASTIN TIME PARTIAL: CPT | Performed by: HOSPITALIST

## 2022-02-06 PROCEDURE — 25000003 PHARM REV CODE 250: Performed by: PHYSICIAN ASSISTANT

## 2022-02-06 PROCEDURE — 25000003 PHARM REV CODE 250: Performed by: NURSE PRACTITIONER

## 2022-02-06 PROCEDURE — 20000000 HC ICU ROOM

## 2022-02-06 PROCEDURE — 99233 PR SUBSEQUENT HOSPITAL CARE,LEVL III: ICD-10-PCS | Mod: ,,, | Performed by: NEUROLOGICAL SURGERY

## 2022-02-06 PROCEDURE — 99233 SBSQ HOSP IP/OBS HIGH 50: CPT | Mod: ,,, | Performed by: NEUROLOGICAL SURGERY

## 2022-02-06 PROCEDURE — 94761 N-INVAS EAR/PLS OXIMETRY MLT: CPT

## 2022-02-06 PROCEDURE — 83735 ASSAY OF MAGNESIUM: CPT | Performed by: HOSPITALIST

## 2022-02-06 PROCEDURE — 93750 INTERROGATION VAD IN PERSON: CPT | Mod: ,,, | Performed by: INTERNAL MEDICINE

## 2022-02-06 PROCEDURE — 25000003 PHARM REV CODE 250: Performed by: HOSPITALIST

## 2022-02-06 PROCEDURE — 99233 SBSQ HOSP IP/OBS HIGH 50: CPT | Mod: ,,, | Performed by: INTERNAL MEDICINE

## 2022-02-06 PROCEDURE — 85025 COMPLETE CBC W/AUTO DIFF WBC: CPT | Performed by: HOSPITALIST

## 2022-02-06 PROCEDURE — 84100 ASSAY OF PHOSPHORUS: CPT | Performed by: HOSPITALIST

## 2022-02-06 PROCEDURE — 83735 ASSAY OF MAGNESIUM: CPT | Mod: 91 | Performed by: INTERNAL MEDICINE

## 2022-02-06 PROCEDURE — 80048 BASIC METABOLIC PNL TOTAL CA: CPT | Mod: 91 | Performed by: INTERNAL MEDICINE

## 2022-02-06 PROCEDURE — 99233 PR SUBSEQUENT HOSPITAL CARE,LEVL III: ICD-10-PCS | Mod: ,,, | Performed by: INTERNAL MEDICINE

## 2022-02-06 RX ORDER — LISINOPRIL 20 MG/1
20 TABLET ORAL NIGHTLY
Status: DISCONTINUED | OUTPATIENT
Start: 2022-02-06 | End: 2022-02-17 | Stop reason: HOSPADM

## 2022-02-06 RX ORDER — LANOLIN ALCOHOL/MO/W.PET/CERES
400 CREAM (GRAM) TOPICAL ONCE
Status: COMPLETED | OUTPATIENT
Start: 2022-02-06 | End: 2022-02-06

## 2022-02-06 RX ORDER — HYDRALAZINE HYDROCHLORIDE 25 MG/1
25 TABLET, FILM COATED ORAL ONCE
Status: DISCONTINUED | OUTPATIENT
Start: 2022-02-06 | End: 2022-02-06

## 2022-02-06 RX ORDER — LISINOPRIL 10 MG/1
10 TABLET ORAL DAILY
Status: DISCONTINUED | OUTPATIENT
Start: 2022-02-07 | End: 2022-02-17 | Stop reason: HOSPADM

## 2022-02-06 RX ORDER — HYDRALAZINE HYDROCHLORIDE 25 MG/1
25 TABLET, FILM COATED ORAL ONCE
Status: COMPLETED | OUTPATIENT
Start: 2022-02-06 | End: 2022-02-06

## 2022-02-06 RX ORDER — POTASSIUM CHLORIDE 20 MEQ/1
40 TABLET, EXTENDED RELEASE ORAL ONCE
Status: COMPLETED | OUTPATIENT
Start: 2022-02-06 | End: 2022-02-06

## 2022-02-06 RX ORDER — LISINOPRIL 10 MG/1
10 TABLET ORAL ONCE
Status: COMPLETED | OUTPATIENT
Start: 2022-02-06 | End: 2022-02-06

## 2022-02-06 RX ADMIN — BACLOFEN 5 MG: 5 TABLET ORAL at 08:02

## 2022-02-06 RX ADMIN — FUROSEMIDE 40 MG: 40 TABLET ORAL at 05:02

## 2022-02-06 RX ADMIN — LISINOPRIL 10 MG: 10 TABLET ORAL at 08:02

## 2022-02-06 RX ADMIN — DOXAZOSIN 2 MG: 2 TABLET ORAL at 08:02

## 2022-02-06 RX ADMIN — HYDRALAZINE HYDROCHLORIDE 25 MG: 25 TABLET, FILM COATED ORAL at 05:02

## 2022-02-06 RX ADMIN — FAMOTIDINE 20 MG: 20 TABLET ORAL at 08:02

## 2022-02-06 RX ADMIN — LISINOPRIL 10 MG: 10 TABLET ORAL at 12:02

## 2022-02-06 RX ADMIN — Medication 400 MG: at 08:02

## 2022-02-06 RX ADMIN — POTASSIUM CHLORIDE 40 MEQ: 1500 TABLET, EXTENDED RELEASE ORAL at 06:02

## 2022-02-06 RX ADMIN — BACLOFEN 5 MG: 5 TABLET ORAL at 04:02

## 2022-02-06 RX ADMIN — WARFARIN SODIUM 1 MG: 1 TABLET ORAL at 05:02

## 2022-02-06 RX ADMIN — OXYCODONE HYDROCHLORIDE AND ACETAMINOPHEN 1 TABLET: 5; 325 TABLET ORAL at 03:02

## 2022-02-06 RX ADMIN — LISINOPRIL 20 MG: 20 TABLET ORAL at 08:02

## 2022-02-06 RX ADMIN — Medication 400 MG: at 06:02

## 2022-02-06 RX ADMIN — FUROSEMIDE 40 MG: 40 TABLET ORAL at 08:02

## 2022-02-06 RX ADMIN — OXYCODONE HYDROCHLORIDE AND ACETAMINOPHEN 1 TABLET: 5; 325 TABLET ORAL at 06:02

## 2022-02-06 RX ADMIN — ATORVASTATIN CALCIUM 80 MG: 20 TABLET, FILM COATED ORAL at 08:02

## 2022-02-06 NOTE — SUBJECTIVE & OBJECTIVE
Interval History: SAH PBD 10, continuing warfarin today, INR 1.2, plan for interval CTH in AM; NAEON, AFVSS, Exam stable     Medications:  Continuous Infusions:   sodium chloride 0.9% Stopped (02/04/22 1040)     Scheduled Meds:   atorvastatin  80 mg Oral Daily    baclofen  5 mg Oral TID    chlorhexidine  15 mL Mouth/Throat BID    doxazosin  2 mg Oral BID    famotidine  20 mg Oral BID    furosemide  40 mg Oral BID    lisinopriL  10 mg Oral BID    magnesium oxide  400 mg Oral Daily    warfarin  1 mg Oral Daily     PRN Meds:sodium chloride, acetaminophen, dextrose 50%, glucagon (human recombinant), insulin aspart U-100, oxyCODONE-acetaminophen     Review of Systems    Objective:     Weight: 82 kg (180 lb 12.4 oz)  Body mass index is 23.21 kg/m².  Vital Signs (Most Recent):  Temp: 98 °F (36.7 °C) (02/06/22 0701)  Pulse: (!) 117 (02/06/22 0900)  Resp: 16 (02/06/22 0900)  BP: (!) 88/64 (02/06/22 0830)  SpO2: 99 % (02/06/22 0900) Vital Signs (24h Range):  Temp:  [98 °F (36.7 °C)-98.2 °F (36.8 °C)] 98 °F (36.7 °C)  Pulse:  [] 117  Resp:  [10-27] 16  SpO2:  [95 %-100 %] 99 %  BP: (64-96)/(0-75) 88/64  Arterial Line BP: ()/(56-84) 82/66     Date 02/06/22 0700 - 02/07/22 0659   Shift 3554-3198 2434-0503 8568-6268 24 Hour Total   INTAKE   P.O. 250   250   Shift Total(mL/kg) 250(3)   250(3)   OUTPUT   Urine(mL/kg/hr) 0   0   Shift Total(mL/kg) 0(0)   0(0)   Weight (kg) 82 82 82 82              NG/OG Tube 01/27/22 2140 18 Fr. Center mouth (Active)   $ NG/OG Tube Placement Complete 01/27/22 2130   Placement Check placement verified by x-ray;placement verified by aspirate characteristics 01/27/22 2309   Tolerance no signs/symptoms of discomfort 01/27/22 2309   Securement secured to commercial device 01/27/22 2309   Clamp Status/Tolerance clamped 01/27/22 2309   Suction Setting/Drainage Method suction at the bedside 01/27/22 2309   Insertion Site Appearance no redness, warmth, tenderness, skin breakdown,  "drainage 01/27/22 2309   Flush/Irrigation flushed w/;water;no resistance met 01/27/22 2309            Urethral Catheter 01/27/22 1858 (Active)   $ Duarte Insertion Complete 01/27/22 1947   Site Assessment Clean;Intact 01/28/22 0300   Collection Container Urimeter 01/28/22 0300   Securement Method secured to top of thigh w/ adhesive device 01/28/22 0300   Catheter Care Performed no 01/28/22 0300   Reason for Continuing Urinary Catheterization Critically ill in ICU and requiring hourly monitoring of intake/output 01/28/22 0300   CAUTI Prevention Bundle StatLock in place w 1" slack;Intact seal between catheter & drainage tubing;Drainage bag/urimeter off the floor;Sheeting clip in use;No dependent loops or kinks;Drainage bag/urimeter not overfilled (<2/3 full);Drainage bag/urimeter below bladder 01/28/22 0300   Output (mL) 55 mL 01/28/22 1000     Physical Exam:  General: AOx3, GCS E4V5M6  CNII-XII: Intact on fine exam, PERRL, visual fields grossly intact, EOMi, facial sensation preserved, no facial assymetry, tongue/uvula/palate midline, shoulder shrug equal, no pronator drift  Extremities: 5/5 motor throughout, sensorium intact throughout, coordination intact throughout    General: Awake, Alert, Oriented  Head: Non-traumatic, normocephalic  Eyes: Pupils equal, EOMi  Neck: Supple, normal ROM, no tenderness to palpation  CVS: Normal rate and rhythm, distal pulses present  Pulm: Symmetric expansion, no respiratory distress  GI: Abdomen nondistended, nontender  MSK: Moves all extremities without restriction, atraumatic  Skin: Dry, intact  Psych: Normal thought content and cognition    Significant Labs:  Recent Labs   Lab 02/04/22 2201 02/05/22 0411 02/06/22  0333   GLU  --  134* 110   * 134* 137   K  --  3.9 3.7   CL  --  105 107   CO2  --  22* 20*   BUN  --  16 13   CREATININE  --  0.8 0.7   CALCIUM  --  8.3* 8.1*   MG  --  1.8 1.7     Recent Labs   Lab 02/05/22 0411 02/06/22  0333   WBC 8.03 7.82   HGB 9.4* 9.4* "   HCT 29.6* 29.5*    177     Recent Labs   Lab 02/05/22  0411 02/06/22  0333   INR 1.3* 1.2   APTT 29.9 28.8     Microbiology Results (last 7 days)     ** No results found for the last 168 hours. **        All pertinent labs from the last 24 hours have been reviewed.    Significant Diagnostics:  All significant diagnostics reviewed by NSGY resident

## 2022-02-06 NOTE — PROGRESS NOTES
[facility]  Cardiology  Progress Note     Hospital Length of Stay: 10    Interval History:  No acute events.  MAP ~90 this AM.  Feeling well outside of mild HA.    Vitals:  Temp:  [98 °F (36.7 °C)-98.2 °F (36.8 °C)] 98.2 °F (36.8 °C)  Pulse:  [] 121  Resp:  [10-27] 12  SpO2:  [95 %-100 %] 98 %  BP: (70-96)/(0-75) 88/64  Arterial Line BP: ()/(55-84) 71/55    Intake/Output    Intake/Output Summary (Last 24 hours) at 2/6/2022 1122  Last data filed at 2/6/2022 1100  Gross per 24 hour   Intake 630 ml   Output 1525 ml   Net -895 ml       Physical Exam:    Gen: NAD, sitting up in bed, conversant  Cardio: WWP.  Ausculation deferred given COVID.  Pulm: No increased WOB  Skin: Warm, dry  Neuro: Alert and oriented x3, moving all ext    Labs:  Recent Labs   Lab 02/04/22  0306 02/05/22  0411 02/06/22  0333   WBC 7.24 8.03 7.82   HGB 9.4* 9.4* 9.4*   HCT 29.9* 29.6* 29.5*    171 177     Recent Labs   Lab 02/04/22  0417 02/04/22  0920 02/05/22  0411 02/06/22  0333 02/06/22  1011      < > 134* 137 135*   K 3.7   < > 3.9 3.7 4.0   *   < > 105 107 107   CO2 22*   < > 22* 20* 23   BUN 13   < > 16 13 14   CREATININE 0.7   < > 0.8 0.7 0.8   *   < > 134* 110 178*   CALCIUM 8.2*   < > 8.3* 8.1* 8.5*   MG 1.7   < > 1.8 1.7 1.7   PHOS 2.0*  --  2.1* 2.4*  --     < > = values in this interval not displayed.       Imaging:  No new.    Current Meds:   atorvastatin  80 mg Oral Daily    baclofen  5 mg Oral TID    chlorhexidine  15 mL Mouth/Throat BID    doxazosin  2 mg Oral BID    famotidine  20 mg Oral BID    furosemide  40 mg Oral BID    lisinopriL  10 mg Oral BID    magnesium oxide  400 mg Oral Daily    warfarin  1 mg Oral Daily       Continuous Infusions:   sodium chloride 0.9% Stopped (02/04/22 1040)       PRN:  sodium chloride, acetaminophen, dextrose 50%, glucagon (human recombinant), insulin aspart U-100, oxyCODONE-acetaminophen    Assessment and Plan:  65 yo BM with  stage D CHF due to NICMP s/p HM3, s/p ICD, HTN, HLD, T2DM was transferred from outside hospital emergency room after he was found to have subdural hematoma/subarachnoid hemorrhage.  Patient presented to the ER after he was found to have a syncopal event.  Also patient had respiratory distress on arrival for which he was intubated.  Patient was transferred here for further evaluation.  On arrival patient is intubated and is on propofol.  Neurosurgery and Neuro Critical Care were immediately informed.  Neurosurgery recommended to repeat CT after reviewing the prior CT done in the ER in [place].  INR on arrival is 1.6.  He also had a low flow with a increased PI around 4:00 p.m. this evening.  According to wife patient was complaining of headache for the last 2 weeks..  [place] post office this [place] and she does not know what exactly happened..  ICD was interrogated and did not show any VT/VF.         * Bilateral subdural hematomas  -Patient had a traumatic subdural, subarachnoid hemorrhage after fall on 1/27. Currently no focal deficits  -NCC signed off. NSGY following.   -INR subtherapeutic. Warfarin restarted 2/5 at low dose.  Daily INR for now.   -Goal Na+ > 135  -Keppra started as AED. EEG negative for seizures.  -Transcranial dopplers done 1/31 without evidence of vasospasm.  -Continue q1h neurochecks  -Repeat head CT 2/7 per neurosurgery vs. later today if ongoing HA or new Sx     LVAD (left ventricular assist device) present  -S/P DT HM3 1/13/21  -Current speed 5000  -INR goal 2.0-3.0. Current INR subtherapeutic. Coumadin restarted as above.  -ASA stopped.   -LDH stable     Essential hypertension  -Levophed off. MAP goal >65  -Continue Lisinopril to 10mg AM 20mg PM, doxazosin 2mg BID.     Acute respiratory failure requiring reintubation  - Extubated 1/28 and on RA  - Tx'd with remdesivir which was since discontinued. Asymptomatic from covid perspective     Type 2 diabetes mellitus without  complication  -SSI  BRIELLES       Mike Hayes MD  Cardiovascular PGY4  [facility]  Pager: [phone]  Phone: [phone]

## 2022-02-06 NOTE — PROGRESS NOTES
[facility] - Cardiac Intensive Care  Neurosurgery  Progress Note    Subjective:     History of Present Illness: 66M PMHx cardiomyopathy, s/p LVAD on coumadin and CAD s/p stenting on ASA, presenting after fall, consulted to NSGY for SDH. Pt had syncopal event earlier today with prodrome of dizziness, fell, hit head, went to OSH for workup and was complaining of occipital H/A. At OSH, pt had acute neurologic decline, had to be intubated, and presents to [facility] intubated, sedated and with no motor activity. However, 2 hours after presentation to [facility], pt's neurologic status has improved to awake, alert, and following commands briskly with minor headache.       Post-Op Info:  * No surgery found *         Interval History: SAH PBD 10, continuing warfarin today, INR 1.2, plan for interval CTH in AM; JACQUE HIGHTOWER, Exam stable     Medications:  Continuous Infusions:   sodium chloride 0.9% Stopped (02/04/22 1040)     Scheduled Meds:   atorvastatin  80 mg Oral Daily    baclofen  5 mg Oral TID    chlorhexidine  15 mL Mouth/Throat BID    doxazosin  2 mg Oral BID    famotidine  20 mg Oral BID    furosemide  40 mg Oral BID    lisinopriL  10 mg Oral BID    magnesium oxide  400 mg Oral Daily    warfarin  1 mg Oral Daily     PRN Meds:sodium chloride, acetaminophen, dextrose 50%, glucagon (human recombinant), insulin aspart U-100, oxyCODONE-acetaminophen     Review of Systems    Objective:     Weight: 82 kg (180 lb 12.4 oz)  Body mass index is 23.21 kg/m².  Vital Signs (Most Recent):  Temp: 98 °F (36.7 °C) (02/06/22 0701)  Pulse: (!) 117 (02/06/22 0900)  Resp: 16 (02/06/22 0900)  BP: (!) 88/64 (02/06/22 0830)  SpO2: 99 % (02/06/22 0900) Vital Signs (24h Range):  Temp:  [98 °F (36.7 °C)-98.2 °F (36.8 °C)] 98 °F (36.7 °C)  Pulse:  [] 117  Resp:  [10-27] 16  SpO2:  [95 %-100 %] 99 %  BP: (64-96)/(0-75) 88/64  Arterial Line BP: ()/(56-84) 82/66     Date 02/06/22 0700 - 02/07/22 0659   Shift [id number] 5619-4106  "8820-2074 24 Hour Total   INTAKE   P.O. 250   250   Shift Total(mL/kg) 250(3)   250(3)   OUTPUT   Urine(mL/kg/hr) 0   0   Shift Total(mL/kg) 0(0)   0(0)   Weight (kg) 82 82 82 82              NG/OG Tube 01/27/22 2140 18 Fr. Center mouth (Active)   $ NG/OG Tube Placement Complete 01/27/22 2130   Placement Check placement verified by x-ray;placement verified by aspirate characteristics 01/27/22 2309   Tolerance no signs/symptoms of discomfort 01/27/22 2309   Securement secured to commercial device 01/27/22 2309   Clamp Status/Tolerance clamped 01/27/22 2309   Suction Setting/Drainage Method suction at the bedside 01/27/22 2309   Insertion Site Appearance no redness, warmth, tenderness, skin breakdown, drainage 01/27/22 2309   Flush/Irrigation flushed w/;water;no resistance met 01/27/22 2309            Urethral Catheter 01/27/22 1858 (Active)   $ Duarte Insertion Complete 01/27/22 1947   Site Assessment Clean;Intact 01/28/22 0300   Collection Container Urimeter 01/28/22 0300   Securement Method secured to top of thigh w/ adhesive device 01/28/22 0300   Catheter Care Performed no 01/28/22 0300   Reason for Continuing Urinary Catheterization Critically ill in ICU and requiring hourly monitoring of intake/output 01/28/22 0300   CAUTI Prevention Bundle StatLock in place w 1" slack;Intact seal between catheter & drainage tubing;Drainage bag/urimeter off the floor;Sheeting clip in use;No dependent loops or kinks;Drainage bag/urimeter not overfilled (<2/3 full);Drainage bag/urimeter below bladder 01/28/22 0300   Output (mL) 55 mL 01/28/22 1000     Physical Exam:  General: AOx3, GCS E4V5M6  CNII-XII: Intact on fine exam, PERRL, visual fields grossly intact, EOMi, facial sensation preserved, no facial assymetry, tongue/uvula/palate midline, shoulder shrug equal, no pronator drift  Extremities: 5/5 motor throughout, sensorium intact throughout, coordination intact throughout    General: Awake, Alert, Oriented  Head: " Non-traumatic, normocephalic  Eyes: Pupils equal, EOMi  Neck: Supple, normal ROM, no tenderness to palpation  CVS: Normal rate and rhythm, distal pulses present  Pulm: Symmetric expansion, no respiratory distress  GI: Abdomen nondistended, nontender  MSK: Moves all extremities without restriction, atraumatic  Skin: Dry, intact  Psych: Normal thought content and cognition    Significant Labs:  Recent Labs   Lab 02/04/22 2201 02/05/22 0411 02/06/22  0333   GLU  --  134* 110   * 134* 137   K  --  3.9 3.7   CL  --  105 107   CO2  --  22* 20*   BUN  --  16 13   CREATININE  --  0.8 0.7   CALCIUM  --  8.3* 8.1*   MG  --  1.8 1.7     Recent Labs   Lab 02/05/22 0411 02/06/22  0333   WBC 8.03 7.82   HGB 9.4* 9.4*   HCT 29.6* 29.5*    177     Recent Labs   Lab 02/05/22 0411 02/06/22  0333   INR 1.3* 1.2   APTT 29.9 28.8     Microbiology Results (last 7 days)     ** No results found for the last 168 hours. **        All pertinent labs from the last 24 hours have been reviewed.    Significant Diagnostics:  All significant diagnostics reviewed by NSGY resident      Assessment/Plan:     Subarachnoid bleed  66M PMHx cardiomyopathy, s/p LVAD on coumadin and CAD s/p stenting on ASA, presenting after syncopal event and fall with hitting head, found to have bilateral SDH R>L on CT at OSH, rpt CT with new cisternal SAH, negative aneurysm or vascular malformation on CTA, likely nonaneurysmal perimesencephalic SAH    --Admitted to CICU; CICU team primary   -continued q1h neurochecks in ICU  --All labs and diagnostics reviewed   -CTH 1/27 OSH: bilat aucte on chronic SDH, R > L, no shift, no hydro, cisterns open.    -CTH 1/27 rpt: new SAH in interpeduncular and quadrigeminal cisterns   -CTA 1/27: negative for underlying vascular lesion   -CTH 1/29: stable       -CTH 2/1: SAH essentially resolved, convexity subdurals improved. vents stable   -CTA 2/4: no vascular anomaly or vasospasm present, stable SDH & resolved  SAH  --Completed post-acute SAH treatment; continued primary medical management per CICU  --SBP <160  --Na > 135  --OK to continue coumadin at this time; will get repeat CTH 2/7  --TEDs/SCDs  --OK for diet per primary team  --All further work-up/management per CICU  --Please call NSGY with any acute exam changes    Dispo: per primary team           Venkata Restrepo MD  Neurosurgery  [facility] - Cardiac Intensive Care

## 2022-02-06 NOTE — PLAN OF CARE
Care Plan    POC reviewed with Rocco France and family. Questions and concerns addressed. No acute events today. Pt progressing toward goals. One time dose of additional 10mg lisinopril administered for a-line MAPs sustaining >90 and doppler pressure 90/0. Remains Aox4. No VAD alarms. Will continue to monitor. See below and flowsheets for full assessment and VS info.       Neuro:  [place]ain Coma Scale  Best Eye Response: 4-->(E4) spontaneous  Best Motor Response: 6-->(M6) obeys commands  Best Verbal Response: 5-->(V5) oriented  [place] Coma Scale Score: 15  Assessment Qualifiers: patient not sedated/intubated  Pupil PERRLA: yes  24 hr Temp:  [98 °F (36.7 °C)-98.2 °F (36.8 °C)]      CV:  Rhythm: sinus tachycardia  DVT prophylaxis: VTE Required Core Measure: Pharmacological prophylaxis initiated/maintained    Resp:  O2 Device (Oxygen Therapy): room air      GI/:  GI prophylaxis: yes  Diet/Nutrition Received: low saturated fat/low cholesterol,2 gram sodium  Last Bowel Movement: 02/01/22  Voiding Characteristics: voids spontaneously without difficulty   Intake/Output Summary (Last 24 hours) at 2/6/2022 0555  Last data filed at 2/6/2022 0300  Gross per 24 hour   Intake 630 ml   Output 1150 ml   Net -520 ml       Labs/Accuchecks:  Recent Labs   Lab 02/06/22  0333   WBC 7.82   RBC 3.26*   HGB 9.4*   HCT 29.5*         Recent Labs   Lab 02/04/22  0417 02/04/22  0920 02/06/22  0333      < > 137   K 3.7   < > 3.7   CO2 22*   < > 20*   *   < > 107   BUN 13   < > 13   CREATININE 0.7   < > 0.7   ALKPHOS 95  --   --    ALT 8*  --   --    AST 17  --   --    BILITOT 1.3*  --   --     < > = values in this interval not displayed.      Recent Labs   Lab 02/06/22  0333   INR 1.2   APTT 28.8    No results for input(s): CPK, CPKMB, TROPONINI, MB in the last 168 hours.    Electrolytes: Electrolytes replaced  Accuchecks: Q6H    Gtts/LDAs:   sodium chloride 0.9% Stopped (02/04/22 1040)       Lines/Drains/Airways        Arterial Line              Arterial Line 01/27/22 1947 Left Radial 9 days              Line                   VAD 01/13/21 1211 Left ventricular assist device HeartMate 3 388 days              Peripheral Intravenous Line                   Peripheral IV - Single Lumen 02/01/22 2000 20 G Posterior;Right Forearm 4 days         Peripheral IV - Single Lumen 02/04/22 2207 20 G Anterior;Left Forearm 1 day                    Skin/Wounds  Bathing/Skin Care: patient refused (Would like to do bath during the day instead) (02/06/22 0301)  Wounds: No  Wound care consulted: No    Consults  Consults (From admission, onward)          Status Ordering Provider     Inpatient consult to Electrophysiology  Once        Provider:  (Not yet assigned)    Completed MIC RASHID     Inpatient consult to Neuro Critical Care  Once        Provider:  (Not yet assigned)    Completed SHARYN CHAND     Inpatient consult to Neurosurgery  Once        Provider:  (Not yet assigned)    Completed SHARYN CHAND

## 2022-02-06 NOTE — PROGRESS NOTES
Notified Omega Chua MD that patients MAP was > 90 from arterial line and doppler was 96. Hydralazine po ordered

## 2022-02-07 LAB
ALBUMIN SERPL BCP-MCNC: 2.3 G/DL (ref 3.5–5.2)
ALP SERPL-CCNC: 119 U/L (ref 55–135)
ALT SERPL W/O P-5'-P-CCNC: 7 U/L (ref 10–44)
ANION GAP SERPL CALC-SCNC: 6 MMOL/L (ref 8–16)
APTT BLDCRRT: 26.9 SEC (ref 21–32)
AST SERPL-CCNC: 13 U/L (ref 10–40)
BASOPHILS # BLD AUTO: 0.01 K/UL (ref 0–0.2)
BASOPHILS NFR BLD: 0.1 % (ref 0–1.9)
BILIRUB DIRECT SERPL-MCNC: 0.6 MG/DL (ref 0.1–0.3)
BILIRUB SERPL-MCNC: 1.1 MG/DL (ref 0.1–1)
BNP SERPL-MCNC: 322 PG/ML (ref 0–99)
BUN SERPL-MCNC: 17 MG/DL (ref 8–23)
CALCIUM SERPL-MCNC: 8.4 MG/DL (ref 8.7–10.5)
CHLORIDE SERPL-SCNC: 107 MMOL/L (ref 95–110)
CO2 SERPL-SCNC: 24 MMOL/L (ref 23–29)
CREAT SERPL-MCNC: 0.7 MG/DL (ref 0.5–1.4)
CRP SERPL-MCNC: 18.2 MG/L (ref 0–8.2)
DIFFERENTIAL METHOD: ABNORMAL
EOSINOPHIL # BLD AUTO: 0.1 K/UL (ref 0–0.5)
EOSINOPHIL NFR BLD: 1.6 % (ref 0–8)
ERYTHROCYTE [DISTWIDTH] IN BLOOD BY AUTOMATED COUNT: 15.2 % (ref 11.5–14.5)
EST. GFR  (AFRICAN AMERICAN): >60 ML/MIN/1.73 M^2
EST. GFR  (NON AFRICAN AMERICAN): >60 ML/MIN/1.73 M^2
GLUCOSE SERPL-MCNC: 117 MG/DL (ref 70–110)
HCT VFR BLD AUTO: 28.6 % (ref 40–54)
HGB BLD-MCNC: 8.9 G/DL (ref 14–18)
IMM GRANULOCYTES # BLD AUTO: 0.02 K/UL (ref 0–0.04)
IMM GRANULOCYTES NFR BLD AUTO: 0.3 % (ref 0–0.5)
INR PPP: 1.2 (ref 0.8–1.2)
LDH SERPL L TO P-CCNC: 197 U/L (ref 110–260)
LYMPHOCYTES # BLD AUTO: 1.5 K/UL (ref 1–4.8)
LYMPHOCYTES NFR BLD: 20.1 % (ref 18–48)
MAGNESIUM SERPL-MCNC: 1.7 MG/DL (ref 1.6–2.6)
MCH RBC QN AUTO: 28.6 PG (ref 27–31)
MCHC RBC AUTO-ENTMCNC: 31.1 G/DL (ref 32–36)
MCV RBC AUTO: 92 FL (ref 82–98)
MONOCYTES # BLD AUTO: 1 K/UL (ref 0.3–1)
MONOCYTES NFR BLD: 12.6 % (ref 4–15)
NEUTROPHILS # BLD AUTO: 4.9 K/UL (ref 1.8–7.7)
NEUTROPHILS NFR BLD: 65.3 % (ref 38–73)
NRBC BLD-RTO: 0 /100 WBC
PHOSPHATE SERPL-MCNC: 2.5 MG/DL (ref 2.7–4.5)
PLATELET # BLD AUTO: 169 K/UL (ref 150–450)
PMV BLD AUTO: 11.2 FL (ref 9.2–12.9)
POCT GLUCOSE: 128 MG/DL (ref 70–110)
POCT GLUCOSE: 149 MG/DL (ref 70–110)
POTASSIUM SERPL-SCNC: 4 MMOL/L (ref 3.5–5.1)
PREALB SERPL-MCNC: 11 MG/DL (ref 20–43)
PROT SERPL-MCNC: 6.6 G/DL (ref 6–8.4)
PROTHROMBIN TIME: 12.4 SEC (ref 9–12.5)
RBC # BLD AUTO: 3.11 M/UL (ref 4.6–6.2)
SODIUM SERPL-SCNC: 137 MMOL/L (ref 136–145)
WBC # BLD AUTO: 7.51 K/UL (ref 3.9–12.7)

## 2022-02-07 PROCEDURE — 86140 C-REACTIVE PROTEIN: CPT | Performed by: HOSPITALIST

## 2022-02-07 PROCEDURE — 93750 PR INTERROGATE VENT ASSIST DEV, IN PERSON, W PHYSICIAN ANALYSIS: ICD-10-PCS | Mod: ,,, | Performed by: INTERNAL MEDICINE

## 2022-02-07 PROCEDURE — 20000000 HC ICU ROOM

## 2022-02-07 PROCEDURE — 80048 BASIC METABOLIC PNL TOTAL CA: CPT | Performed by: HOSPITALIST

## 2022-02-07 PROCEDURE — 80076 HEPATIC FUNCTION PANEL: CPT | Performed by: HOSPITALIST

## 2022-02-07 PROCEDURE — 25000003 PHARM REV CODE 250: Performed by: STUDENT IN AN ORGANIZED HEALTH CARE EDUCATION/TRAINING PROGRAM

## 2022-02-07 PROCEDURE — 97530 THERAPEUTIC ACTIVITIES: CPT

## 2022-02-07 PROCEDURE — 63600175 PHARM REV CODE 636 W HCPCS: Performed by: INTERNAL MEDICINE

## 2022-02-07 PROCEDURE — 85025 COMPLETE CBC W/AUTO DIFF WBC: CPT | Performed by: HOSPITALIST

## 2022-02-07 PROCEDURE — 99233 PR SUBSEQUENT HOSPITAL CARE,LEVL III: ICD-10-PCS | Mod: ,,, | Performed by: INTERNAL MEDICINE

## 2022-02-07 PROCEDURE — 99233 SBSQ HOSP IP/OBS HIGH 50: CPT | Mod: ,,, | Performed by: INTERNAL MEDICINE

## 2022-02-07 PROCEDURE — 99291 PR CRITICAL CARE, E/M 30-74 MINUTES: ICD-10-PCS | Mod: ,,, | Performed by: PHYSICIAN ASSISTANT

## 2022-02-07 PROCEDURE — 25000003 PHARM REV CODE 250

## 2022-02-07 PROCEDURE — 84100 ASSAY OF PHOSPHORUS: CPT | Performed by: HOSPITALIST

## 2022-02-07 PROCEDURE — 83880 ASSAY OF NATRIURETIC PEPTIDE: CPT | Performed by: HOSPITALIST

## 2022-02-07 PROCEDURE — 84134 ASSAY OF PREALBUMIN: CPT | Performed by: HOSPITALIST

## 2022-02-07 PROCEDURE — 99291 CRITICAL CARE FIRST HOUR: CPT | Mod: ,,, | Performed by: PHYSICIAN ASSISTANT

## 2022-02-07 PROCEDURE — 85610 PROTHROMBIN TIME: CPT | Performed by: INTERNAL MEDICINE

## 2022-02-07 PROCEDURE — 83615 LACTATE (LD) (LDH) ENZYME: CPT | Performed by: HOSPITALIST

## 2022-02-07 PROCEDURE — 27000207 HC ISOLATION

## 2022-02-07 PROCEDURE — 93750 INTERROGATION VAD IN PERSON: CPT | Mod: ,,, | Performed by: INTERNAL MEDICINE

## 2022-02-07 PROCEDURE — 25000003 PHARM REV CODE 250: Performed by: NURSE PRACTITIONER

## 2022-02-07 PROCEDURE — 27000248 HC VAD-ADDITIONAL DAY

## 2022-02-07 PROCEDURE — 97116 GAIT TRAINING THERAPY: CPT

## 2022-02-07 PROCEDURE — 94761 N-INVAS EAR/PLS OXIMETRY MLT: CPT

## 2022-02-07 PROCEDURE — 83735 ASSAY OF MAGNESIUM: CPT | Performed by: HOSPITALIST

## 2022-02-07 PROCEDURE — 99900035 HC TECH TIME PER 15 MIN (STAT)

## 2022-02-07 PROCEDURE — 25000003 PHARM REV CODE 250: Performed by: PHYSICIAN ASSISTANT

## 2022-02-07 PROCEDURE — 85730 THROMBOPLASTIN TIME PARTIAL: CPT | Performed by: INTERNAL MEDICINE

## 2022-02-07 PROCEDURE — 99233 PR SUBSEQUENT HOSPITAL CARE,LEVL III: ICD-10-PCS | Mod: ,,, | Performed by: NEUROLOGICAL SURGERY

## 2022-02-07 PROCEDURE — 25000003 PHARM REV CODE 250: Performed by: HOSPITALIST

## 2022-02-07 PROCEDURE — 99233 SBSQ HOSP IP/OBS HIGH 50: CPT | Mod: ,,, | Performed by: NEUROLOGICAL SURGERY

## 2022-02-07 RX ORDER — BACLOFEN 10 MG/1
10 TABLET ORAL 3 TIMES DAILY
Status: DISCONTINUED | OUTPATIENT
Start: 2022-02-07 | End: 2022-02-17 | Stop reason: HOSPADM

## 2022-02-07 RX ORDER — LANOLIN ALCOHOL/MO/W.PET/CERES
400 CREAM (GRAM) TOPICAL 2 TIMES DAILY
Status: DISCONTINUED | OUTPATIENT
Start: 2022-02-07 | End: 2022-02-17 | Stop reason: HOSPADM

## 2022-02-07 RX ORDER — MAGNESIUM SULFATE HEPTAHYDRATE 40 MG/ML
2 INJECTION, SOLUTION INTRAVENOUS ONCE
Status: COMPLETED | OUTPATIENT
Start: 2022-02-07 | End: 2022-02-07

## 2022-02-07 RX ADMIN — WARFARIN SODIUM 1 MG: 1 TABLET ORAL at 05:02

## 2022-02-07 RX ADMIN — OXYCODONE HYDROCHLORIDE AND ACETAMINOPHEN 1 TABLET: 5; 325 TABLET ORAL at 06:02

## 2022-02-07 RX ADMIN — CHLORHEXIDINE GLUCONATE 0.12% ORAL RINSE 15 ML: 1.2 LIQUID ORAL at 09:02

## 2022-02-07 RX ADMIN — LISINOPRIL 20 MG: 20 TABLET ORAL at 09:02

## 2022-02-07 RX ADMIN — MAGNESIUM SULFATE 2 G: 2 INJECTION INTRAVENOUS at 06:02

## 2022-02-07 RX ADMIN — DOXAZOSIN 2 MG: 2 TABLET ORAL at 09:02

## 2022-02-07 RX ADMIN — BACLOFEN 10 MG: 10 TABLET ORAL at 03:02

## 2022-02-07 RX ADMIN — FUROSEMIDE 40 MG: 40 TABLET ORAL at 09:02

## 2022-02-07 RX ADMIN — Medication 400 MG: at 09:02

## 2022-02-07 RX ADMIN — FAMOTIDINE 20 MG: 20 TABLET ORAL at 09:02

## 2022-02-07 RX ADMIN — FUROSEMIDE 40 MG: 40 TABLET ORAL at 05:02

## 2022-02-07 RX ADMIN — ATORVASTATIN CALCIUM 80 MG: 20 TABLET, FILM COATED ORAL at 09:02

## 2022-02-07 RX ADMIN — LISINOPRIL 10 MG: 10 TABLET ORAL at 09:02

## 2022-02-07 RX ADMIN — SODIUM CHLORIDE: 0.9 INJECTION, SOLUTION INTRAVENOUS at 06:02

## 2022-02-07 RX ADMIN — BACLOFEN 5 MG: 5 TABLET ORAL at 09:02

## 2022-02-07 RX ADMIN — BACLOFEN 10 MG: 10 TABLET ORAL at 09:02

## 2022-02-07 NOTE — PT/OT/SLP PROGRESS
"Physical Therapy Treatment    Patient Name:  Rocco France   MRN:  [id number]    Recommendations:     Discharge Recommendations:  rehabilitation facility   Discharge Equipment Recommendations: none   Barriers to discharge: Inaccessible home, Decreased caregiver support and risk of falls    Assessment:     Rocco France is a 66 y.o. male admitted with a medical diagnosis of Bilateral subdural hematomas.  He presents with the following impairments/functional limitations:  weakness,impaired endurance,impaired functional mobilty,gait instability,impaired self care skills,impaired balance,decreased lower extremity function,decreased upper extremity function,decreased safety awareness,decreased coordination,impaired cardiopulmonary response to activity. The patient demonstrates decreased activity tolerance and mild unsteadiness with gait. He shows progress with dynamic standing balance activities this session. He ambulated 6' forwards/backwards with contact guard assist and HHA.  Based on the patient's progress with therapy, motivation to participate in treatment, and prior level of independence, they are an excellent candidate for inpatient rehabilitation and they would benefit from rehab to maximize their functional potential.      Rehab Prognosis: Good; patient would benefit from acute skilled PT services to address these deficits and reach maximum level of function.    Recent Surgery: * No surgery found *      Plan:     During this hospitalization, patient to be seen 4 x/week to address the identified rehab impairments via gait training,therapeutic activities,therapeutic exercises,neuromuscular re-education and progress toward the following goals:    · Plan of Care Expires:  03/03/22    Subjective     Chief Complaint: "I feel pretty good"  Patient/Family Comments/goals: to sit up in bedside chair  Pain/Comfort:  · Pain Rating 1: 0/10      Objective:     Communicated with RN prior to session.  Patient found HOB " elevated with arterial line,bed alarm,blood pressure cuff,central line,peripheral IV,LVAD,telemetry,pulse ox (continuous) upon PT entry to room.     General Precautions: Standard, airborne,contact,fall,droplet,LVAD   Orthopedic Precautions:N/A   Braces: N/A  Respiratory Status: Room air     Functional Mobility:    Bed Mobility  Supine to Sit on the R side:  contact guard assist, increased time HOB elevated, use of handrail   Transfers Sit to Stand:  contact guard assist from bed and bedside chair; cued for set up and hand/foot placement, cued for rocking for anterior weight shift   Gait  Pre-gait static marching 10 reps, minimum assistance BRITTANY HHA, increased lateral weight shift, impaired eccentric lowering of LE    Gait Distance: 6 ft forwards/backwards with BRITTANY HHA  Assistance Level: minimum assistance   Description: wide TAMI, kyphotic posture, decreased step length, increased time in double limb stance, slight delayed R LE step initiation, gait tolerance limited by fatigue            AM-PAC 6 CLICK MOBILITY  Turning over in bed (including adjusting bedclothes, sheets and blankets)?: 3  Sitting down on and standing up from a chair with arms (e.g., wheelchair, bedside commode, etc.): 3  Moving from lying on back to sitting on the side of the bed?: 3  Moving to and from a bed to a chair (including a wheelchair)?: 3  Need to walk in hospital room?: 3  Climbing 3-5 steps with a railing?: 2  Basic Mobility Total Score: 17       Therapeutic Activities and Exercises:   Patient educated on role of therapy, goals of session, benefits of out of bed mobility. Patient agreeable to mobilize with therapy.  Discussed PT plan of care during hospitalization. Patient educated that they need to call for assistance to mobilize out of bed. Whiteboard updated as appropriate. Patient educated on how their diagnosis impacts their mobility within PT scope of practice.     Gait training: cued and facilitation for upright posture and  lateral weight shift to initiate stepping    Patient educated on PT schedule.  Encouraged patient to ambulate, sit up in chair 3x/day to prevent deconditioning during hospitalization. Patient verbalized understanding and agreement not to mobilize without RN assist. Patient in agreement with PT POC.      LVAD   -Found on wall power and remained on wall power throughout session  -No alarms sounded during session    Patient left up in chair with all lines intact, call button in reach and RN notified..    GOALS:   Multidisciplinary Problems     Physical Therapy Goals        Problem: Physical Therapy Goal    Goal Priority Disciplines Outcome Goal Variances Interventions   Physical Therapy Goal     PT, PT/OT Ongoing, Progressing     Description: Goals to be met by: 2022      Patient will increase functional independence with mobility by performin. Supine to sit with Modified [facility]  2. Sit to stand transfer with Modified [facility]  3. Bed to chair transfer with Modified [facility] using the least restrictive device.   4. Gait  x 150 feet with Modified [facility] using the least restrictive device.                       Time Tracking:     PT Received On: 22  PT Start Time: 1400     PT Stop Time: 1430  PT Total Time (min): 30 min     Billable Minutes: Gait Training 15 and Therapeutic Activity 15    Treatment Type: Treatment  PT/PTA: PT     PTA Visit Number: 0     2022

## 2022-02-07 NOTE — PROGRESS NOTES
[facility] - Cardiac Intensive [facility]  Heart Transplant  Progress Note    Patient Name: Rocco France  MRN: [id number]  Admission Date: 1/27/2022  Hospital Length of Stay: 11 days  Attending Physician: Pete Baker MD  Primary Care Provider: Teresa Mendoza NP  Principal Problem:Bilateral subdural hematomas    Subjective:     Interval History: No events overnight. Lisinopril increased to previous home dosing with better control. Still having dull headache. CTH done overnight is stable. NS following, will discuss with them regarding increased dosing of coumadin and necessity for ICU level of care with q1h neurochecks. Staff MD to review echo. Appears to be in and out of aflutter. INR 1.2.    Continuous Infusions:   sodium chloride 0.9% 5 mL/hr at 02/07/22 0626     Scheduled Meds:   atorvastatin  80 mg Oral Daily    baclofen  10 mg Oral TID    famotidine  20 mg Oral BID    furosemide  40 mg Oral BID    lisinopriL  10 mg Oral Daily    lisinopriL  20 mg Oral QHS    magnesium oxide  400 mg Oral BID    warfarin  1 mg Oral Daily     PRN Meds:sodium chloride, acetaminophen, dextrose 10%, glucagon (human recombinant), insulin aspart U-100, oxyCODONE-acetaminophen    Review of patient's allergies indicates:   Allergen Reactions    Pcn [penicillins] Hives     Objective:     Vital Signs (Most Recent):  Temp: 97.6 °F (36.4 °C) (02/07/22 1300)  Pulse: 109 (02/07/22 1300)  Resp: 14 (02/07/22 1300)  BP: (!) 72/0 (02/07/22 1300)  SpO2: 96 % (02/07/22 1300) Vital Signs (24h Range):  Temp:  [97.6 °F (36.4 °C)-98.3 °F (36.8 °C)] 97.6 °F (36.4 °C)  Pulse:  [] 109  Resp:  [8-24] 14  SpO2:  [96 %-99 %] 96 %  BP: (64-98)/(0) 72/0  Arterial Line BP: ()/(53-82) 81/64     Patient Vitals for the past 72 hrs (Last 3 readings):   Weight   02/07/22 1300 82.3 kg (181 lb 7 oz)   02/07/22 0300 82.3 kg (181 lb 7 oz)   02/06/22 0300 82 kg (180 lb 12.4 oz)     Body mass index is 23.3 kg/m².      Intake/Output Summary (Last 24  hours) at 2/7/2022 1357  Last data filed at 2/7/2022 1300  Gross per 24 hour   Intake 969.96 ml   Output 1225 ml   Net -255.04 ml           Telemetry: NSR     Physical Exam  Constitutional:       Appearance: Normal appearance.   HENT:      Head: Normocephalic.      Nose: Nose normal.   Cardiovascular:      Rate and Rhythm: Normal rate and regular rhythm.      Pulses: Normal pulses.      Heart sounds: Normal heart sounds.      Comments: VAD hum present  Pulmonary:      Effort: Pulmonary effort is normal.      Breath sounds: Normal breath sounds.   Abdominal:      General: Abdomen is flat. Bowel sounds are normal.      Palpations: Abdomen is soft.   Musculoskeletal:         General: Normal range of motion.      Cervical back: Normal range of motion.   Skin:     General: Skin is warm.      Capillary Refill: Capillary refill takes less than 2 seconds.   Neurological:      General: No focal deficit present.      Mental Status: He is alert and oriented to person, place, and time.       Significant Labs:  CBC:  Recent Labs   Lab 02/05/22  0411 02/06/22  0333 02/07/22  0232   WBC 8.03 7.82 7.51   RBC 3.25* 3.26* 3.11*   HGB 9.4* 9.4* 8.9*   HCT 29.6* 29.5* 28.6*    177 169   MCV 91 91 92   MCH 28.9 28.8 28.6   MCHC 31.8* 31.9* 31.1*     BNP:  Recent Labs   Lab 02/02/22  0312 02/04/22  0306 02/07/22  0232   * 412* 322*     CMP:  Recent Labs   Lab 02/04/22  0306 02/04/22  0306 02/04/22  0417 02/04/22  0920 02/06/22  0333 02/06/22  1011 02/07/22  0232      < > 119*   < > 110 178* 117*   CALCIUM 6.7*   < > 8.2*   < > 8.1* 8.5* 8.4*   ALBUMIN 1.8*  --  2.2*  --   --   --  2.3*   PROT 5.6*  --  6.8  --   --   --  6.6      < > 139   < > 137 135* 137   K 3.1*   < > 3.7   < > 3.7 4.0 4.0   CO2 18*   < > 22*   < > 20* 23 24   *   < > 113*   < > 107 107 107   BUN 12   < > 13   < > 13 14 17   CREATININE 0.6   < > 0.7   < > 0.7 0.8 0.7   ALKPHOS 80  --  95  --   --   --  119   ALT 5*  --  8*  --   --    --  7*   AST 13  --  17  --   --   --  13   BILITOT 1.1*  --  1.3*  --   --   --  1.1*    < > = values in this interval not displayed.      Coagulation:   Recent Labs   Lab 02/05/22 0411 02/06/22  0333 02/07/22  0511   INR 1.3* 1.2 1.2   APTT 29.9 28.8 26.9     LDH:  Recent Labs   Lab 02/05/22 0411 02/06/22  0333 02/07/22  0232    199 197     Microbiology:  Microbiology Results (last 7 days)     ** No results found for the last 168 hours. **        I have reviewed all pertinent labs within the past 24 hours.    Estimated Creatinine Clearance: 120.7 mL/min (based on SCr of 0.7 mg/dL).    Diagnostic Results:  I have reviewed and interpreted all pertinent imaging results/findings within the past 24 hours.    Assessment and Plan:     67 yo BM with stage D CHF due to NICMP s/p HM3, s/p ICD, HTN, HLD, T2DM was transferred from outside hospital emergency room after he was found to have subdural hematoma/subarachnoid hemorrhage.  Patient presented to the ER after he was found to have a syncopal event.  Also patient had respiratory distress on arrival for which he was intubated.  Patient was transferred here for further evaluation.  On arrival patient is intubated and is on propofol.  Neurosurgery and Neuro Critical Care were immediately informed.  Neurosurgery recommended to repeat CT after reviewing the prior CT done in the ER in [place].  INR on arrival is 1.6.  He also had a low flow with a increased PI around 4:00 p.m. this evening.  According to wife patient was complaining of headache for the last 2 weeks..  [place] went [place] post office this [place] and she does not know what exactly happened..  ICD was interrogated and did not show any VT/VF.       * Bilateral subdural hematomas  -Patient had a traumatic subdural, subarachnoid hemorrhage after fall on 1/27. Currently no focal deficits  -NCC signed off. NSGY following.   -INR subtherapeutic. Coumadin restarted 2/5 at low dose with plans to watch INR rise slowly.  Will discuss with NS regarding increasing dose.  -Goal Na+ > 135, slowly trickled down days after stopping hypertonic saline. Salt tabs given prn  -Keppra started as AED. EEG negative for seizures.  -Transcranial dopplers done 1/31 without evidence of vasospasm.  -Continue q1h neurochecks.    Acute respiratory failure requiring reintubation  - Extubated 1/28 and on RA  - Tx'd with remdesivir which was since discontinued. Asymptomatic from covid perspective    LVAD (left ventricular assist device) present  -S/P DT HM3 1/13/21  -Current speed 5000  -INR goal 2.0-3.0. Current INR subtherapeutic. Coumadin restarted as above.  -ASA stopped.   -LDH stable    Procedure: Device Interrogation Including analysis of device parameters  Current Settings: Ventricular Assist Device  Review of device function is stable/unstable stable         TXP LVAD INTERROGATIONS 2/7/2022 2/7/2022 2/7/2022 2/7/2022 2/7/2022 2/7/2022 2/7/2022   Type HeartMate3 HeartMate3 HeartMate3 HeartMate3 HeartMate3 HeartMate3 HeartMate3   Flow 4.4 4.3 4.3 4.6 4.6 4.6 4.5   Speed 5000 5000 5000 5000 5000 5000 5000   PI 3.6 3.6 4.3 2.6 2.7 2.6 3.3   Power (Edwards) 3.3 3.3 3.3 3.4 3.3 3.6 3.3   LSL 4600 4600 4600 4600 4600 4600 4600   Pulsatility Intermittent pulse Intermittent pulse Intermittent pulse Intermittent pulse Intermittent pulse Intermittent pulse Intermittent pulse       Type 2 diabetes mellitus without complication  -SSI  ACHS     Essential hypertension  -Levophed off. MAP goal >65  -Continue Lisinopril 10qam/20qhs, will consider d/c of doxazosin 2mg BID as BP is monitored. Bps on lower side since increasing nighttime dose of lisinopril    Uninterrupted Critical Care/Counseling Time (not including procedures): 60 minutes    Valentina Jose PA-C  Heart Transplant  [facility] - Cardiac [facility]

## 2022-02-07 NOTE — PLAN OF CARE
CICU DAILY GOALS       A: Awake    RASS: Goal - RASS Goal: 0-->alert and calm  Actual - RASS (Jackson Agitation-Sedation Scale): 0-->alert and calm   Restraint necessity: Clinical Justification: Removing medical devices  B: Breath   SBT: Not intubated   C: Coordinate A & B, analgesics/sedatives   Pain: managed    SAT: Not intubated  D: Delirium   CAM-ICU: Overall CAM-ICU: Negative  E: Early(intubated/ Progressive (non-intubated) Mobility   MOVE Screen: Fail   Activity: Activity Management: Rolling - L1  FAS: Feeding/Nutrition   Diet order: Diet/Nutrition Received: low saturated fat/low cholesterol,   Fluid restriction: Fluid Requirement: 2000  T: Thrombus   DVT prophylaxis: VTE Required Core Measure: Pharmacological prophylaxis initiated/maintained  H: HOB Elevation   Head of Bed (HOB) Positioning: HOB at 30-45 degrees  U: Ulcer Prophylaxis   GI: no  G: Glucose control   managed Glycemic Management: blood glucose monitored  S: Skin   Bundle compliance: yes   Bathing/Skin Care: bath, complete,electrode patches/site rotation,dressed/undressed,incontinence care,linen changed Date: 2/6  B: Bowel Function   constipation   I: Indwelling Catheters   Duarte necessity: [REMOVED]      Urethral Catheter 01/27/22 1858-Reason for Continuing Urinary Catheterization: Critically ill in ICU and requiring hourly monitoring of intake/output   CVC necessity: Yes   IPAD offered: Not appropriate  D: De-escalation Antibx   Na    Plan for the day   Monitor map hydralzine 25 mg po given at 1700 for map >90 doppler bp 96/0  Family/Goals of care/Code Status   Code Status: Full Code     No acute events throughout day, VS and assessment per flow sheet, patient progressing towards goals as tolerated, plan of care reviewed with Rocco France and family, all concerns addressed, will continue to monitor. No acute events throughout day, VS and assessment per flow sheet, see below for updates:    Pulmonary: ra    Cardiovascular:  tachycardic  100-110                         LVAD flow 3.7-4.5  speed 5000 power 2.6-6.2 PI 3.4    Neurological: aaoX4 afebrile, pulses doppler    Gastrointestinal: bowel sounds present but slow, states he thinks his last BM was 3 days ago, doc flow shows last bm 2/3    Genitourinary: urinal    Endocrine: WNL    Skin/Bath: see doc flow   Date of last CHG bath given: 2/6    Infusions: NA    Patient progressing towards goals as tolerated, plan of care communicated and reviewed with Rocco France and family, all concerns addressed, will continue to monitor.     ARIE HWANG RN

## 2022-02-07 NOTE — ASSESSMENT & PLAN NOTE
-Levophed off. MAP goal >65  -Continue Lisinopril 10qam/20qhs, will consider d/c of doxazosin 2mg BID as BP is monitored. Bps on lower side since increasing nighttime dose of lisinopril

## 2022-02-07 NOTE — PROGRESS NOTES
02/06/2022  Rafal Cullen    Current provider:  Mike Chauhan MD    Device interrogation:  TXP LVAD INTERROGATIONS 2/6/2022 2/6/2022 2/6/2022 2/6/2022 2/6/2022 2/6/2022 2/6/2022   Type HeartMate3 HeartMate3 HeartMate3 HeartMate3 HeartMate3 HeartMate3 HeartMate3   Flow 4.2 4.3 3.9 4 4.4 4.3 4.4   Speed 5050 5000 5000 5000 5000 5000 5000   PI 4.7 4.0 6.4 6.2 6.1 3.6 3.8   Power (Edwards) 3.4 3.4 3.4 3.4 3.4 3.4 3.4   LSL 4600 4600 4600 4600 4600 4600 4600   Pulsatility Intermittent pulse Intermittent pulse Intermittent pulse Intermittent pulse Intermittent pulse Intermittent pulse Intermittent pulse          Rounded on Rocco France to ensure all mechanical assist device settings (IABP or VAD) were appropriate and all parameters were within limits.  I was able to ensure all back up equipment was present, the staff had no issues, and the Perfusion Department daily rounding was complete.      For implantable VADs: Interrogation of Ventricular assist device was performed with analysis of device parameters and review of device function. I have personally reviewed the interrogation findings and agree with findings as stated.     In emergency, the nursing units have been notified to contact the perfusion department either by:  Calling [phone] from 630am to 4pm Mon thru Fri, utilizing the On-Call Finder functionality of Epic and searching for Perfusion, or by contacting the hospital  from 4pm to 630am and on weekends and asking to speak with the perfusionist on call.    8:49 PM

## 2022-02-07 NOTE — PLAN OF CARE
Patient to move out of ICU setting once q1hr neuro checks lifted by neurology and medially ready from HTS standpoint    PT/OT recs IPR---May progress to HH vs OP per notes    Placed PMR consult  SHAINA likely Friday vs early next week    SW placing O-Rehab (LVAD) referral via CP

## 2022-02-07 NOTE — SUBJECTIVE & OBJECTIVE
Interval History: No events overnight. Lisinopril increased to previous home dosing with better control. Still having dull headache. CTH done overnight is stable. NS following, will discuss with them regarding increased dosing of coumadin and necessity for ICU level of care with q1h neurochecks. Staff MD to review echo. Appears to be in and out of aflutter. INR 1.2.    Continuous Infusions:   sodium chloride 0.9% 5 mL/hr at 02/07/22 0626     Scheduled Meds:   atorvastatin  80 mg Oral Daily    baclofen  10 mg Oral TID    famotidine  20 mg Oral BID    furosemide  40 mg Oral BID    lisinopriL  10 mg Oral Daily    lisinopriL  20 mg Oral QHS    magnesium oxide  400 mg Oral BID    warfarin  1 mg Oral Daily     PRN Meds:sodium chloride, acetaminophen, dextrose 10%, glucagon (human recombinant), insulin aspart U-100, oxyCODONE-acetaminophen    Review of patient's allergies indicates:   Allergen Reactions    Pcn [penicillins] Hives     Objective:     Vital Signs (Most Recent):  Temp: 97.6 °F (36.4 °C) (02/07/22 1300)  Pulse: 109 (02/07/22 1300)  Resp: 14 (02/07/22 1300)  BP: (!) 72/0 (02/07/22 1300)  SpO2: 96 % (02/07/22 1300) Vital Signs (24h Range):  Temp:  [97.6 °F (36.4 °C)-98.3 °F (36.8 °C)] 97.6 °F (36.4 °C)  Pulse:  [] 109  Resp:  [8-24] 14  SpO2:  [96 %-99 %] 96 %  BP: (64-98)/(0) 72/0  Arterial Line BP: ()/(53-82) 81/64     Patient Vitals for the past 72 hrs (Last 3 readings):   Weight   02/07/22 1300 82.3 kg (181 lb 7 oz)   02/07/22 0300 82.3 kg (181 lb 7 oz)   02/06/22 0300 82 kg (180 lb 12.4 oz)     Body mass index is 23.3 kg/m².      Intake/Output Summary (Last 24 hours) at 2/7/2022 1357  Last data filed at 2/7/2022 1300  Gross per 24 hour   Intake 969.96 ml   Output 1225 ml   Net -255.04 ml           Telemetry: NSR     Physical Exam  Constitutional:       Appearance: Normal appearance.   HENT:      Head: Normocephalic.      Nose: Nose normal.   Cardiovascular:      Rate and Rhythm: Normal  rate and regular rhythm.      Pulses: Normal pulses.      Heart sounds: Normal heart sounds.      Comments: VAD hum present  Pulmonary:      Effort: Pulmonary effort is normal.      Breath sounds: Normal breath sounds.   Abdominal:      General: Abdomen is flat. Bowel sounds are normal.      Palpations: Abdomen is soft.   Musculoskeletal:         General: Normal range of motion.      Cervical back: Normal range of motion.   Skin:     General: Skin is warm.      Capillary Refill: Capillary refill takes less than 2 seconds.   Neurological:      General: No focal deficit present.      Mental Status: He is alert and oriented to person, place, and time.       Significant Labs:  CBC:  Recent Labs   Lab 02/05/22  0411 02/06/22  0333 02/07/22  0232   WBC 8.03 7.82 7.51   RBC 3.25* 3.26* 3.11*   HGB 9.4* 9.4* 8.9*   HCT 29.6* 29.5* 28.6*    177 169   MCV 91 91 92   MCH 28.9 28.8 28.6   MCHC 31.8* 31.9* 31.1*     BNP:  Recent Labs   Lab 02/02/22  0312 02/04/22  0306 02/07/22  0232   * 412* 322*     CMP:  Recent Labs   Lab 02/04/22  0306 02/04/22  0306 02/04/22  0417 02/04/22  0920 02/06/22  0333 02/06/22  1011 02/07/22  0232      < > 119*   < > 110 178* 117*   CALCIUM 6.7*   < > 8.2*   < > 8.1* 8.5* 8.4*   ALBUMIN 1.8*  --  2.2*  --   --   --  2.3*   PROT 5.6*  --  6.8  --   --   --  6.6      < > 139   < > 137 135* 137   K 3.1*   < > 3.7   < > 3.7 4.0 4.0   CO2 18*   < > 22*   < > 20* 23 24   *   < > 113*   < > 107 107 107   BUN 12   < > 13   < > 13 14 17   CREATININE 0.6   < > 0.7   < > 0.7 0.8 0.7   ALKPHOS 80  --  95  --   --   --  119   ALT 5*  --  8*  --   --   --  7*   AST 13  --  17  --   --   --  13   BILITOT 1.1*  --  1.3*  --   --   --  1.1*    < > = values in this interval not displayed.      Coagulation:   Recent Labs   Lab 02/05/22 0411 02/06/22 0333 02/07/22  0511   INR 1.3* 1.2 1.2   APTT 29.9 28.8 26.9     LDH:  Recent Labs   Lab 02/05/22 0411 02/06/22 0333 02/07/22  0232     199 197     Microbiology:  Microbiology Results (last 7 days)     ** No results found for the last 168 hours. **        I have reviewed all pertinent labs within the past 24 hours.    Estimated Creatinine Clearance: 120.7 mL/min (based on SCr of 0.7 mg/dL).    Diagnostic Results:  I have reviewed and interpreted all pertinent imaging results/findings within the past 24 hours.

## 2022-02-07 NOTE — ASSESSMENT & PLAN NOTE
-Patient had a traumatic subdural, subarachnoid hemorrhage after fall on 1/27. Currently no focal deficits  -NCC signed off. NSGY following.   -INR subtherapeutic. Coumadin restarted 2/5 at low dose with plans to watch INR rise slowly. Will discuss with NS regarding increasing dose.  -Goal Na+ > 135, slowly trickled down days after stopping hypertonic saline. Salt tabs given prn  -Keppra started as AED. EEG negative for seizures.  -Transcranial dopplers done 1/31 without evidence of vasospasm.  -Continue q1h neurochecks.

## 2022-02-07 NOTE — PROGRESS NOTES
"[facility] - Cardiac [facility]  Adult Nutrition  Consult Note    SUMMARY     Recommendations    1. Continue current Regular diet, add Diabetic diet restrictions if necessary (A1C 7.7); fluid restriction per MD.  2. Change ONS to Optisource if Vitamin K a concern.  3. RD to monitor & follow-up.    Goals: Meet % EEN, EPN by RD f/u date  Nutrition Goal Status: new  Communication of RD Recs: reviewed with RN    Assessment and Plan    Nutrition Problem:  Increased nutrient needs    Related to (etiology):   Physiological causes     Signs and Symptoms (as evidenced by):   +COVID-19    Interventions(treatment strategy):  Collaboration of nutrition care w/ other providers  ONS    Nutrition Diagnosis Status:   New    Reason for Assessment    Reason For Assessment: length of stay  Diagnosis: other (see comments) (Bilateral SDH)  Relevant Medical History: CHF, HTN, HLD, DM, LVAD (1/2021)  Interdisciplinary Rounds: did not attend    General Information Comments: +COVID-19. Per RN documentation, pt tolerating diet w/ 50% PO intake; ONS ordered. Unsure of PO intake PTA; UBW: 190-200#, per chart review. RD unable to assess for malnutrition - will monitor energy intake & weight changes.  Nutrition Discharge Planning: Adequate PO intake    Nutrition/Diet History    Spiritual, Cultural Beliefs, Scientology Practices, Values that Affect Care: no  Factors Affecting Nutritional Intake: decreased appetite    Anthropometrics    Temp: 97.6 °F (36.4 °C)  Height Method: Stated  Height: 6' 2" (188 cm)  Height (inches): 74 in  Weight Method: Bed Scale  Weight: 82.3 kg (181 lb 7 oz)  Weight (lb): 181.44 lb  Ideal Body Weight (IBW), Male: 190 lb  % Ideal Body Weight, Male (lb): 95.49 %  BMI (Calculated): 23.3  BMI Grade: 18.5-24.9 - normal    Lab/Procedures/Meds    Pertinent Labs Reviewed: reviewed  Pertinent Labs Comments: A1C 7.7  Pertinent Medications Reviewed: reviewed  Pertinent Medications Comments: Coumadin    Estimated/Assessed " Needs    Weight Used For Calorie Calculations: 82.3 kg (181 lb 7 oz)     Energy Calorie Requirements (kcal): 2006 kcal/d  Energy Need Method: Sharp-St Jeor (1.2 PAL)     Protein Requirements: 82-99 g/d (1-1.2 g/kg)  Weight Used For Protein Calculations: 82.3 kg (181 lb 7 oz)     Estimated Fluid Requirement Method: other (see comments) (Per MD or 1 mL/kcal)  RDA Method (mL): 2006    Nutrition Prescription Ordered    Current Diet Order: Regular  Nutrition Order Comments: 2000 mL FR  Oral Nutrition Supplement: Boost Glucose Control    Evaluation of Received Nutrient/Fluid Intake    I/O: +1.6L since admit    Comments: LBM: 2/3    Tolerance: tolerating    Nutrition Risk    Level of Risk/Frequency of Follow-up: (1x/week)     Monitor and Evaluation    Food and Nutrient Intake: energy intake,food and beverage intake  Food and Nutrient Adminstration: diet order  Physical Activity and Function: nutrition-related ADLs and IADLs  Anthropometric Measurements: weight,weight change  Biochemical Data, Medical Tests and Procedures: inflammatory profile,lipid profile,glucose/endocrine profile,gastrointestinal profile,electrolyte and renal panel  Nutrition-Focused Physical Findings: overall appearance     Nutrition Follow-Up    RD Follow-up?: Yes

## 2022-02-07 NOTE — PLAN OF CARE
Recommendations     1. Continue current Regular diet, add Diabetic diet restrictions if necessary (A1C 7.7); fluid restriction per MD.  2. Change ONS to Optisource if Vitamin K a concern.  3. RD to monitor & follow-up.     Goals: Meet % EEN, EPN by RD f/u date  Nutrition Goal Status: new  Communication of RD Recs: reviewed with RN

## 2022-02-07 NOTE — PLAN OF CARE
Care Plan    POC reviewed with Rocco France. Questions and concerns addressed at bedside. No acute events overnight. Headache pain not as bad throughout night with worst pain rating 2/10. Pt taken for CTH at about 0300. Remains Aox4. No VAD alarms. Will continue to monitor. See below and flowsheets for full assessment and VS info.       Neuro:  [place] Coma Scale  Best Eye Response: 4-->(E4) spontaneous  Best Motor Response: 6-->(M6) obeys commands  Best Verbal Response: 5-->(V5) oriented  Nain Coma Scale Score: 15  Assessment Qualifiers: patient not sedated/intubated  Pupil PERRLA: yes  24 hr Temp:  [98 °F (36.7 °C)-98.3 °F (36.8 °C)]      CV:  Rhythm: sinus tachycardia  DVT prophylaxis: VTE Required Core Measure: Pharmacological prophylaxis initiated/maintained    Resp:  O2 Device (Oxygen Therapy): room air      GI/:  GI prophylaxis: yes  Diet/Nutrition Received: low saturated fat/low cholesterol,2 gram sodium  Last Bowel Movement: 02/01/22  Voiding Characteristics: voids spontaneously without difficulty   Intake/Output Summary (Last 24 hours) at 2/7/2022 0536  Last data filed at 2/7/2022 0300  Gross per 24 hour   Intake 1060 ml   Output 1225 ml   Net -165 ml       Labs/Accuchecks:  Recent Labs   Lab 02/07/22  0232   WBC 7.51   RBC 3.11*   HGB 8.9*   HCT 28.6*         Recent Labs   Lab 02/07/22  0232      K 4.0   CO2 24      BUN 17   CREATININE 0.7   ALKPHOS 119   ALT 7*   AST 13   BILITOT 1.1*      Recent Labs   Lab 02/06/22  0333   INR 1.2   APTT 28.8    No results for input(s): CPK, CPKMB, TROPONINI, MB in the last 168 hours.    Electrolytes: Electrolytes replaced  Accuchecks: Q6H    Gtts/LDAs:   sodium chloride 0.9% Stopped (02/04/22 1040)       Lines/Drains/Airways       Arterial Line              Arterial Line 01/27/22 1947 Left Radial 10 days              Line                   VAD 01/13/21 1211 Left ventricular assist device HeartMate 3 389 days              Peripheral  Intravenous Line                   Peripheral IV - Single Lumen 02/04/22 2207 20 G Anterior;Left Forearm 2 days         Peripheral IV - Single Lumen 02/07/22 0245 20 G Anterior;Left;Proximal Forearm <1 day                    Skin/Wounds  Bathing/Skin Care: bath, complete;electrode patches/site rotation;dressed/undressed;incontinence care;linen changed (02/06/22 1600)  Wounds: No  Wound care consulted: No    Consults  Consults (From admission, onward)          Status Ordering Provider     Inpatient consult to Electrophysiology  Once        Provider:  (Not yet assigned)    Completed MIC RASHID     Inpatient consult to Neuro Critical Care  Once        Provider:  (Not yet assigned)    Completed SHARYN CHAND     Inpatient consult to Neurosurgery  Once        Provider:  (Not yet assigned)    Completed SHARYN CHAND

## 2022-02-07 NOTE — ASSESSMENT & PLAN NOTE
-S/P DT HM3 1/13/21  -Current speed 5000  -INR goal 2.0-3.0. Current INR subtherapeutic. Coumadin restarted as above.  -ASA stopped.   -LDH stable    Procedure: Device Interrogation Including analysis of device parameters  Current Settings: Ventricular Assist Device  Review of device function is stable/unstable stable         TXP LVAD INTERROGATIONS 2/7/2022 2/7/2022 2/7/2022 2/7/2022 2/7/2022 2/7/2022 2/7/2022   Type HeartMate3 HeartMate3 HeartMate3 HeartMate3 HeartMate3 HeartMate3 HeartMate3   Flow 4.4 4.3 4.3 4.6 4.6 4.6 4.5   Speed 5000 5000 5000 5000 5000 5000 5000   PI 3.6 3.6 4.3 2.6 2.7 2.6 3.3   Power (Edwards) 3.3 3.3 3.3 3.4 3.3 3.6 3.3   LSL 4600 4600 4600 4600 4600 4600 4600   Pulsatility Intermittent pulse Intermittent pulse Intermittent pulse Intermittent pulse Intermittent pulse Intermittent pulse Intermittent pulse

## 2022-02-07 NOTE — PROGRESS NOTES
Pt AAAO, no family at bedside. Talked with pt about his back up equipment and asked him to please be sure his wife brings him back up equipment before he goes home or we will need to issue him new equipment and charge him for it.  Pt verbalized understanding and agreement.  He said he will call her now.  No further needs from me at this time.

## 2022-02-07 NOTE — PROGRESS NOTES
"02/07/2022  Micah Velez Jr    Current provider:  Pete Baker MD    Device interrogation:  TXP LVAD INTERROGATIONS 2/7/2022 2/7/2022 2/7/2022 2/7/2022 2/7/2022 2/7/2022 2/7/2022   Type HeartMate3 HeartMate3 HeartMate3 HeartMate3 HeartMate3 HeartMate3 HeartMate3   Flow 4.2 3.9 3.9 4.5 4.0 4.2 4.0   Speed 5050 5000 5000 5000 5000 5000 5000   PI 5.5 7 6.7 4.5 6.2 4.6 4.4   Power (Edwards) 3.3 3.3 3.3 3.4 3.4 3.4 3.3   LSL 4600 4600 4600 4600 4600 4600 4600   Pulsatility Intermittent pulse Intermittent pulse Intermittent pulse Intermittent pulse Intermittent pulse Intermittent pulse Intermittent pulse          As floor rounding has been requested to be limited during COVID-19 patient quarantine by Infectious Disease and leadership, only "electronic" rounding has been performed on this mechanical assist device patient.  Electronic rounding includes verifying in Epic that current settings and measurements for the device have been documented appropriately and that they are within limits.  Additionally, this rounding verifies that the EQUIPMENT section of the VAD flowsheet is documented in Epic, specifically checking the presence of emergency equipment and controller self test.  Any discrepancies will be related to the care team.    For implantable VADs: Interrogation of Ventricular assist device was performed with analysis of device parameters and review of device function. I have personally reviewed the interrogation findings and agree with findings as stated.     In emergency, the nursing units have been notified to contact the perfusion department either by:  Calling [phone] from 630am to 4pm Mon thru Fri, or by contacting the hospital  from 4pm to 630am and on weekends and asking to speak with the perfusionist on call.    Micah Velez Jr  10:06 AM  "

## 2022-02-08 LAB
ANION GAP SERPL CALC-SCNC: 7 MMOL/L (ref 8–16)
APTT BLDCRRT: 29.1 SEC (ref 21–32)
BASOPHILS # BLD AUTO: 0.02 K/UL (ref 0–0.2)
BASOPHILS NFR BLD: 0.3 % (ref 0–1.9)
BUN SERPL-MCNC: 15 MG/DL (ref 8–23)
CALCIUM SERPL-MCNC: 8.5 MG/DL (ref 8.7–10.5)
CHLORIDE SERPL-SCNC: 102 MMOL/L (ref 95–110)
CO2 SERPL-SCNC: 22 MMOL/L (ref 23–29)
CREAT SERPL-MCNC: 0.7 MG/DL (ref 0.5–1.4)
DIFFERENTIAL METHOD: ABNORMAL
EOSINOPHIL # BLD AUTO: 0.1 K/UL (ref 0–0.5)
EOSINOPHIL NFR BLD: 1.4 % (ref 0–8)
ERYTHROCYTE [DISTWIDTH] IN BLOOD BY AUTOMATED COUNT: 15.3 % (ref 11.5–14.5)
EST. GFR  (AFRICAN AMERICAN): >60 ML/MIN/1.73 M^2
EST. GFR  (NON AFRICAN AMERICAN): >60 ML/MIN/1.73 M^2
GLUCOSE SERPL-MCNC: 137 MG/DL (ref 70–110)
HCT VFR BLD AUTO: 28.5 % (ref 40–54)
HGB BLD-MCNC: 9 G/DL (ref 14–18)
IMM GRANULOCYTES # BLD AUTO: 0.03 K/UL (ref 0–0.04)
IMM GRANULOCYTES NFR BLD AUTO: 0.4 % (ref 0–0.5)
INR PPP: 1.2 (ref 0.8–1.2)
LDH SERPL L TO P-CCNC: 240 U/L (ref 110–260)
LYMPHOCYTES # BLD AUTO: 1.4 K/UL (ref 1–4.8)
LYMPHOCYTES NFR BLD: 18.1 % (ref 18–48)
MAGNESIUM SERPL-MCNC: 1.7 MG/DL (ref 1.6–2.6)
MCH RBC QN AUTO: 29.1 PG (ref 27–31)
MCHC RBC AUTO-ENTMCNC: 31.6 G/DL (ref 32–36)
MCV RBC AUTO: 92 FL (ref 82–98)
MONOCYTES # BLD AUTO: 0.9 K/UL (ref 0.3–1)
MONOCYTES NFR BLD: 11.4 % (ref 4–15)
NEUTROPHILS # BLD AUTO: 5.3 K/UL (ref 1.8–7.7)
NEUTROPHILS NFR BLD: 68.4 % (ref 38–73)
NRBC BLD-RTO: 0 /100 WBC
PHOSPHATE SERPL-MCNC: 2.6 MG/DL (ref 2.7–4.5)
PLATELET # BLD AUTO: 153 K/UL (ref 150–450)
PMV BLD AUTO: 10.9 FL (ref 9.2–12.9)
POCT GLUCOSE: 131 MG/DL (ref 70–110)
POCT GLUCOSE: 152 MG/DL (ref 70–110)
POCT GLUCOSE: 154 MG/DL (ref 70–110)
POCT GLUCOSE: 154 MG/DL (ref 70–110)
POCT GLUCOSE: 186 MG/DL (ref 70–110)
POTASSIUM SERPL-SCNC: 3.7 MMOL/L (ref 3.5–5.1)
PROTHROMBIN TIME: 12.6 SEC (ref 9–12.5)
RBC # BLD AUTO: 3.09 M/UL (ref 4.6–6.2)
SODIUM SERPL-SCNC: 131 MMOL/L (ref 136–145)
WBC # BLD AUTO: 7.7 K/UL (ref 3.9–12.7)

## 2022-02-08 PROCEDURE — 25000003 PHARM REV CODE 250: Performed by: STUDENT IN AN ORGANIZED HEALTH CARE EDUCATION/TRAINING PROGRAM

## 2022-02-08 PROCEDURE — 36620 INSERTION CATHETER ARTERY: CPT

## 2022-02-08 PROCEDURE — 99291 CRITICAL CARE FIRST HOUR: CPT | Mod: ,,, | Performed by: PHYSICIAN ASSISTANT

## 2022-02-08 PROCEDURE — 97535 SELF CARE MNGMENT TRAINING: CPT

## 2022-02-08 PROCEDURE — 85610 PROTHROMBIN TIME: CPT | Performed by: HOSPITALIST

## 2022-02-08 PROCEDURE — 25000003 PHARM REV CODE 250: Performed by: PHYSICIAN ASSISTANT

## 2022-02-08 PROCEDURE — 85730 THROMBOPLASTIN TIME PARTIAL: CPT | Performed by: HOSPITALIST

## 2022-02-08 PROCEDURE — 84100 ASSAY OF PHOSPHORUS: CPT | Performed by: HOSPITALIST

## 2022-02-08 PROCEDURE — 25000003 PHARM REV CODE 250: Performed by: INTERNAL MEDICINE

## 2022-02-08 PROCEDURE — 27000248 HC VAD-ADDITIONAL DAY

## 2022-02-08 PROCEDURE — 97530 THERAPEUTIC ACTIVITIES: CPT

## 2022-02-08 PROCEDURE — 83735 ASSAY OF MAGNESIUM: CPT | Performed by: HOSPITALIST

## 2022-02-08 PROCEDURE — 99233 SBSQ HOSP IP/OBS HIGH 50: CPT | Mod: ,,, | Performed by: INTERNAL MEDICINE

## 2022-02-08 PROCEDURE — 20000000 HC ICU ROOM

## 2022-02-08 PROCEDURE — 27200188 HC TRANSDUCER, ART ADULT/PEDS

## 2022-02-08 PROCEDURE — 94761 N-INVAS EAR/PLS OXIMETRY MLT: CPT

## 2022-02-08 PROCEDURE — 99233 PR SUBSEQUENT HOSPITAL CARE,LEVL III: ICD-10-PCS | Mod: ,,, | Performed by: INTERNAL MEDICINE

## 2022-02-08 PROCEDURE — 93750 INTERROGATION VAD IN PERSON: CPT | Mod: ,,, | Performed by: INTERNAL MEDICINE

## 2022-02-08 PROCEDURE — 80048 BASIC METABOLIC PNL TOTAL CA: CPT | Performed by: HOSPITALIST

## 2022-02-08 PROCEDURE — 63600175 PHARM REV CODE 636 W HCPCS: Performed by: PHYSICIAN ASSISTANT

## 2022-02-08 PROCEDURE — 99291 PR CRITICAL CARE, E/M 30-74 MINUTES: ICD-10-PCS | Mod: ,,, | Performed by: PHYSICIAN ASSISTANT

## 2022-02-08 PROCEDURE — 93750 PR INTERROGATE VENT ASSIST DEV, IN PERSON, W PHYSICIAN ANALYSIS: ICD-10-PCS | Mod: ,,, | Performed by: INTERNAL MEDICINE

## 2022-02-08 PROCEDURE — 25000003 PHARM REV CODE 250: Performed by: HOSPITALIST

## 2022-02-08 PROCEDURE — 85025 COMPLETE CBC W/AUTO DIFF WBC: CPT | Performed by: HOSPITALIST

## 2022-02-08 PROCEDURE — 25000003 PHARM REV CODE 250

## 2022-02-08 PROCEDURE — C1751 CATH, INF, PER/CENT/MIDLINE: HCPCS

## 2022-02-08 PROCEDURE — 99233 PR SUBSEQUENT HOSPITAL CARE,LEVL III: ICD-10-PCS | Mod: ,,, | Performed by: NEUROLOGICAL SURGERY

## 2022-02-08 PROCEDURE — 83615 LACTATE (LD) (LDH) ENZYME: CPT | Performed by: HOSPITALIST

## 2022-02-08 PROCEDURE — 99233 SBSQ HOSP IP/OBS HIGH 50: CPT | Mod: ,,, | Performed by: NEUROLOGICAL SURGERY

## 2022-02-08 RX ORDER — POLYETHYLENE GLYCOL 3350 17 G/17G
17 POWDER, FOR SOLUTION ORAL DAILY
Status: DISCONTINUED | OUTPATIENT
Start: 2022-02-08 | End: 2022-02-17 | Stop reason: HOSPADM

## 2022-02-08 RX ORDER — WARFARIN 2 MG/1
4 TABLET ORAL DAILY
Status: DISCONTINUED | OUTPATIENT
Start: 2022-02-08 | End: 2022-02-10

## 2022-02-08 RX ORDER — FUROSEMIDE 10 MG/ML
40 INJECTION INTRAMUSCULAR; INTRAVENOUS ONCE
Status: COMPLETED | OUTPATIENT
Start: 2022-02-08 | End: 2022-02-08

## 2022-02-08 RX ORDER — AMOXICILLIN 250 MG
1 CAPSULE ORAL DAILY
Status: DISCONTINUED | OUTPATIENT
Start: 2022-02-08 | End: 2022-02-17 | Stop reason: HOSPADM

## 2022-02-08 RX ORDER — FUROSEMIDE 80 MG/1
80 TABLET ORAL 2 TIMES DAILY
Status: DISCONTINUED | OUTPATIENT
Start: 2022-02-08 | End: 2022-02-17 | Stop reason: HOSPADM

## 2022-02-08 RX ORDER — MAGNESIUM SULFATE HEPTAHYDRATE 40 MG/ML
2 INJECTION, SOLUTION INTRAVENOUS ONCE
Status: COMPLETED | OUTPATIENT
Start: 2022-02-08 | End: 2022-02-08

## 2022-02-08 RX ORDER — POTASSIUM CHLORIDE 20 MEQ/1
40 TABLET, EXTENDED RELEASE ORAL ONCE
Status: COMPLETED | OUTPATIENT
Start: 2022-02-08 | End: 2022-02-08

## 2022-02-08 RX ORDER — FUROSEMIDE 40 MG/1
40 TABLET ORAL 2 TIMES DAILY
Status: DISCONTINUED | OUTPATIENT
Start: 2022-02-08 | End: 2022-02-08

## 2022-02-08 RX ADMIN — FUROSEMIDE 80 MG: 80 TABLET ORAL at 06:02

## 2022-02-08 RX ADMIN — ATORVASTATIN CALCIUM 80 MG: 20 TABLET, FILM COATED ORAL at 08:02

## 2022-02-08 RX ADMIN — POLYETHYLENE GLYCOL 3350 17 G: 17 POWDER, FOR SOLUTION ORAL at 08:02

## 2022-02-08 RX ADMIN — OXYCODONE HYDROCHLORIDE AND ACETAMINOPHEN 1 TABLET: 5; 325 TABLET ORAL at 01:02

## 2022-02-08 RX ADMIN — LISINOPRIL 10 MG: 10 TABLET ORAL at 08:02

## 2022-02-08 RX ADMIN — SODIUM CHLORIDE 5 ML/HR: 0.9 INJECTION, SOLUTION INTRAVENOUS at 09:02

## 2022-02-08 RX ADMIN — BACLOFEN 10 MG: 10 TABLET ORAL at 08:02

## 2022-02-08 RX ADMIN — FAMOTIDINE 20 MG: 20 TABLET ORAL at 08:02

## 2022-02-08 RX ADMIN — FUROSEMIDE 40 MG: 10 INJECTION INTRAMUSCULAR; INTRAVENOUS at 12:02

## 2022-02-08 RX ADMIN — Medication 400 MG: at 08:02

## 2022-02-08 RX ADMIN — SENNOSIDES AND DOCUSATE SODIUM 1 TABLET: 50; 8.6 TABLET ORAL at 08:02

## 2022-02-08 RX ADMIN — LISINOPRIL 20 MG: 20 TABLET ORAL at 08:02

## 2022-02-08 RX ADMIN — MAGNESIUM SULFATE IN WATER 2 G: 40 INJECTION, SOLUTION INTRAVENOUS at 09:02

## 2022-02-08 RX ADMIN — OXYCODONE HYDROCHLORIDE AND ACETAMINOPHEN 1 TABLET: 5; 325 TABLET ORAL at 04:02

## 2022-02-08 RX ADMIN — WARFARIN SODIUM 4 MG: 2 TABLET ORAL at 05:02

## 2022-02-08 RX ADMIN — POTASSIUM CHLORIDE 40 MEQ: 1500 TABLET, EXTENDED RELEASE ORAL at 08:02

## 2022-02-08 RX ADMIN — FUROSEMIDE 40 MG: 40 TABLET ORAL at 09:02

## 2022-02-08 RX ADMIN — Medication 1 G: at 08:02

## 2022-02-08 RX ADMIN — BACLOFEN 10 MG: 10 TABLET ORAL at 02:02

## 2022-02-08 NOTE — ASSESSMENT & PLAN NOTE
-Levophed off. MAP goal >65  -Continue Lisinopril 10qam/20qhs,  doxazosin 2mg BID initiated but discontinued yesterday as BP controlled with adjust lisinopril dosing. Will monitor closely and reinitiate if elevated Bps return

## 2022-02-08 NOTE — PT/OT/SLP EVAL
Occupational Therapy   Treatment       Patient Name:  Rocco France   MRN:  [id number]  Admit Date: 1/27/2022  Admitting Diagnosis:  Bilateral subdural hematomas   Length of Stay: 12 days  Recent Surgery: * No surgery found *      Recommendations:     Discharge Recommendations: home health PT,home health OT  Discharge Equipment Recommendations:  3-in-1 commode  Barriers to discharge:  Inaccessible home environment,Decreased caregiver support (impaired functional endurance)    Plan:     Patient to be seen 4 x/week to address the above listed problems via self-care/home management,therapeutic activities,therapeutic exercises  · Plan of Care Expires: 02/28/22  · Plan of Care Reviewed with: patient    Assessment:   Rocco France is a 66 y.o. male with a medical diagnosis of Bilateral subdural hematomas.  He presents with the following performance deficits affecting function: weakness,impaired endurance,impaired self care skills,impaired functional mobilty,gait instability,impaired balance,decreased coordination,decreased upper extremity function,decreased lower extremity function,decreased safety awareness,pain,impaired cardiopulmonary response to activity.  Pt continues to benefit from a collaborative PT/OT/SLP program to improve quality of life and focus on recovery of impairments.     Patient with improved functional endurance, though primary barrier to safe discharge home is drop in MAP during mobility, with accompanying fatigue. Patient limited in activity tolerance, standing madan hygeine, requiring CGA-Tommy with MAP range 56-58, recovered to 62-70 with seated rest break after aprx 1 min.   Patient remains below PLOF and would benefit from HHOT to maximize safety and minimize risk of rehospitalization.     Rehab Prognosis: Fair; patient would benefit from acute skilled OT services to address these deficits and reach maximum level of function.        Subjective   Communicated with: Rn prior to session.  Patient  "found up in chair with arterial line,bed alarm,blood pressure cuff,central line,LVAD,peripheral IV,pulse ox (continuous),telemetry upon OT entry to room.    Patient: "I just don't feel like myself"     Pain/Comfort:  Pain Rating 1: 0/10  Pain Rating Post-Intervention 1: 0/10    Objective:   Patient found with: arterial line,bed alarm,blood pressure cuff,central line,LVAD,peripheral IV,pulse ox (continuous),telemetry     General Precautions: Standard, Cardiac airborne,droplet,fall,LVAD,contact   Orthopedic Precautions:N/A   Braces: N/A   Respiratory Status:    Room air  Vitals: BP (!) 82/0 (BP Location: Left arm, Patient Position: Sitting)   Pulse 105   Temp 98.7 °F (37.1 °C) (Oral)   Resp (!) 25   Ht 6' 2" (1.88 m)   Wt 82.3 kg (181 lb 7 oz)   SpO2 98%   BMI 23.30 kg/m²     Outcome Measures:  AMPAC 6 Click ADL: 19    Cognition:   · Oriented X 4 and Alert  · Command following: easily distracted by fatigue w exertion and follows one-step commands  · Communication: clear/fluent    Occupational Performance:  Bed Mobility:    · Patient completed Supine to Sit with stand by assistance on R side of bed  · Scooting anteriorly to EOB to have both feet planted on floor: stand by assistance    Functional Mobility/Transfers:    Static Sitting EOB: SBA ; pt endorsed "mild dizziness" seated EOB, recovered w time   Dynamic Sitting EOB: CGA   Patient completed Sit <> Stand Transfer with contact guard assistance  with  hand-held assist    Static Standing Balance: CGA -Min A standing madan hygiene, bed placed behind patient for support, pt's MAP ranging 56-58, recovered to 62-70 with seated rest break after aprx 1 min.   Dynamic Standing Balance: Min A w HH assist    Patient completed Bed <> Chair Transfer using Step Transfer technique with contact guard assistance with hand-held assist   Functional mobility: 8 feet with CGA w HH assist. Pt required 0 rest breaks.      Activities of Daily Living:  · Feeding:  modified " independence set up seated in chair  · Grooming: modified independence pt declined performing in standing  · Upper Body Dressing: contact guard assistance seated EOB donning LVAD carrier   · Toileting: contact guard assistance standing madan hygeine at bed level, pt using LUE for posterior madan hygeine.     AMPA 6 Click ADL:  AMPAC Total Score: 19    Treatment & Education:  -OT POC, safety during ADLs and mobility   -Education on energy conservation and task modification to maximize safety and independence  -Questions answered within OT scope of practice.      Patient left up in chair with all lines intact, call button in reach, RN notified and LVAD on wall power throughout session    GOALS:   Multidisciplinary Problems     Occupational Therapy Goals        Problem: Occupational Therapy Goal    Goal Priority Disciplines Outcome Interventions   Occupational Therapy Goal     OT, PT/OT Ongoing, Progressing    Description: Goals set on 1/29 with expiration date 2/12:  Patient will increase functional independence with ADLs by performing:    Supine <> Sit with [place].  Grooming while standing at sink with Mod [place] using DME as needed.   UB Dressing with [place].  LB Dressing with [place].  Step transfer with Mod [place] with DME as needed.  Pt will demonstrate understanding of education provided regarding energy conservation and task modification through teach-back method.                      Time Tracking:     OT Date of Treatment: 02/08/22  OT Start Time: 1254  OT Stop Time: 1322  OT Total Time (min): 28 min  Additional staff present: N/A      Billable Minutes:Self Care/Home Management 10  Therapeutic Activity 18      2/8/2022

## 2022-02-08 NOTE — ASSESSMENT & PLAN NOTE
-S/P DT HM3 1/13/21  -Current speed 5000  -INR goal 2.0-3.0. Current INR subtherapeutic. Coumadin restarted as above.  -ASA stopped.   -LDH stable    Procedure: Device Interrogation Including analysis of device parameters  Current Settings: Ventricular Assist Device  Review of device function is stable/unstable stable         TXP LVAD INTERROGATIONS 2/8/2022 2/8/2022 2/8/2022 2/8/2022 2/8/2022 2/8/2022 2/8/2022   Type HeartMate3 HeartMate3 HeartMate3 HeartMate3 HeartMate3 HeartMate3 HeartMate3   Flow 4.3 4.7 4.3 4.4 4.5 4.5 4.0   Speed 5000 5000 5000 5000 5000 5000 5000   PI 4.6 2.8 3.6 3.3 2.7 3.4 5.8   Power (Edwards) 3.3 3.4 3.3 3.3 3.3 3.4 3.3   LSL 4600 4600 4600 4600 4600 4600 4600   Pulsatility Intermittent pulse Intermittent pulse Intermittent pulse Intermittent pulse Intermittent pulse Intermittent pulse Intermittent pulse

## 2022-02-08 NOTE — PROGRESS NOTES
[facility] - Cardiac Intensive Care  Heart Transplant  Progress Note    Patient Name: Rocco France  MRN: [id number]  Admission Date: 1/27/2022  Hospital Length of Stay: 12 days  Attending Physician: Pete Baker MD  Primary Care Provider: Teresa Mendoza NP  Principal Problem:Bilateral subdural hematomas    Subjective:     Interval History: No acute events overnight. Bps have improved with lisinopril 10mg qam/20mg qhs. Cardura discontinued. INR 1.2, PharmD to dose coumadin. Continuing ICU level care with frequent neurochecks as we trend towards therapeutic INR. JVD to around midneck estimated to CVP of about 11-12, will increase po lasix to achieve negative fluid balance by tomorrow, repeat limited echo to eval RV to determine addition of BB. Replacing lytes. Will give salt tab for Na 131.     Continuous Infusions:   sodium chloride 0.9% 5 mL/hr (02/08/22 0914)     Scheduled Meds:   atorvastatin  80 mg Oral Daily    baclofen  10 mg Oral TID    famotidine  20 mg Oral BID    furosemide  80 mg Oral BID    lisinopriL  10 mg Oral Daily    lisinopriL  20 mg Oral QHS    magnesium oxide  400 mg Oral BID    polyethylene glycol  17 g Oral Daily    senna-docusate 8.6-50 mg  1 tablet Oral Daily    sodium chloride  1 g Oral Daily    warfarin  4 mg Oral Daily     PRN Meds:sodium chloride, acetaminophen, dextrose 10%, glucagon (human recombinant), insulin aspart U-100, oxyCODONE-acetaminophen    Review of patient's allergies indicates:   Allergen Reactions    Pcn [penicillins] Hives     Objective:     Vital Signs (Most Recent):  Temp: 98.2 °F (36.8 °C) (02/08/22 1101)  Pulse: 63 (02/08/22 1400)  Resp: 17 (02/08/22 1400)  BP: (!) 70/0 (02/08/22 1101)  SpO2: 100 % (02/08/22 1400) Vital Signs (24h Range):  Temp:  [98.2 °F (36.8 °C)-99.6 °F (37.6 °C)] 98.2 °F (36.8 °C)  Pulse:  [] 63  Resp:  [7-33] 17  SpO2:  [96 %-100 %] 100 %  BP: (68-88)/(0) 70/0  Arterial Line BP: ()/(56-86) 89/56     Patient  Vitals for the past 72 hrs (Last 3 readings):   Weight   02/07/22 1300 82.3 kg (181 lb 7 oz)   02/07/22 0300 82.3 kg (181 lb 7 oz)   02/06/22 0300 82 kg (180 lb 12.4 oz)     Body mass index is 23.3 kg/m².      Intake/Output Summary (Last 24 hours) at 2/8/2022 1440  Last data filed at 2/8/2022 1200  Gross per 24 hour   Intake 843.1 ml   Output 1330 ml   Net -486.9 ml           Telemetry: NSR     Physical Exam  Constitutional:       Appearance: Normal appearance.   HENT:      Head: Normocephalic.      Nose: Nose normal.   Neck:      Comments: JVP about midneck at 45 degrees, estimated CVP of 11-12  Cardiovascular:      Rate and Rhythm: Normal rate and regular rhythm.      Pulses: Normal pulses.      Heart sounds: Normal heart sounds.      Comments: VAD hum present  Pulmonary:      Effort: Pulmonary effort is normal.      Breath sounds: Normal breath sounds.   Abdominal:      General: Abdomen is flat. Bowel sounds are normal.      Palpations: Abdomen is soft.   Musculoskeletal:         General: Normal range of motion.      Cervical back: Normal range of motion.   Skin:     General: Skin is warm.      Capillary Refill: Capillary refill takes less than 2 seconds.   Neurological:      General: No focal deficit present.      Mental Status: He is alert and oriented to person, place, and time.       Significant Labs:  CBC:  Recent Labs   Lab 02/06/22  0333 02/07/22  0232 02/08/22  0444   WBC 7.82 7.51 7.70   RBC 3.26* 3.11* 3.09*   HGB 9.4* 8.9* 9.0*   HCT 29.5* 28.6* 28.5*    169 153   MCV 91 92 92   MCH 28.8 28.6 29.1   MCHC 31.9* 31.1* 31.6*     BNP:  Recent Labs   Lab 02/02/22  0312 02/04/22  0306 02/07/22  0232   * 412* 322*     CMP:  Recent Labs   Lab 02/04/22  0306 02/04/22  0306 02/04/22  0417 02/04/22  0920 02/06/22  1011 02/07/22  0232 02/08/22  0444      < > 119*   < > 178* 117* 137*   CALCIUM 6.7*   < > 8.2*   < > 8.5* 8.4* 8.5*   ALBUMIN 1.8*  --  2.2*  --   --  2.3*  --    PROT 5.6*  --   6.8  --   --  6.6  --       < > 139   < > 135* 137 131*   K 3.1*   < > 3.7   < > 4.0 4.0 3.7   CO2 18*   < > 22*   < > 23 24 22*   *   < > 113*   < > 107 107 102   BUN 12   < > 13   < > 14 17 15   CREATININE 0.6   < > 0.7   < > 0.8 0.7 0.7   ALKPHOS 80  --  95  --   --  119  --    ALT 5*  --  8*  --   --  7*  --    AST 13  --  17  --   --  13  --    BILITOT 1.1*  --  1.3*  --   --  1.1*  --     < > = values in this interval not displayed.      Coagulation:   Recent Labs   Lab 02/06/22  0333 02/07/22  0511 02/08/22  0444   INR 1.2 1.2 1.2   APTT 28.8 26.9 29.1     LDH:  Recent Labs   Lab 02/06/22  0333 02/07/22  0232 02/08/22  0444    197 240     Microbiology:  Microbiology Results (last 7 days)     ** No results found for the last 168 hours. **        I have reviewed all pertinent labs within the past 24 hours.    Estimated Creatinine Clearance: 120.7 mL/min (based on SCr of 0.7 mg/dL).    Diagnostic Results:  I have reviewed and interpreted all pertinent imaging results/findings within the past 24 hours.    Assessment and Plan:     65 yo BM with stage D CHF due to NICMP s/p HM3, s/p ICD, HTN, HLD, T2DM was transferred from outside hospital emergency room after he was found to have subdural hematoma/subarachnoid hemorrhage.  Patient presented to the ER after he was found to have a syncopal event.  Also patient had respiratory distress on arrival for which he was intubated.  Patient was transferred here for further evaluation.  On arrival patient is intubated and is on propofol.  Neurosurgery and Neuro Critical Care were immediately informed.  Neurosurgery recommended to repeat CT after reviewing the prior CT done in the ER in [place].  INR on arrival is 1.6.  He also had a low flow with a increased PI around 4:00 p.m. this evening.  According to wife patient was complaining of headache for the last 2 weeks..  He went to post office this [place] and she does not know what exactly happened..  ICD  was interrogated and did not show any VT/VF.       * Bilateral subdural hematomas  -Patient had a traumatic subdural, subarachnoid hemorrhage after fall on 1/27. Currently no focal deficits  -NCC signed off. NSGY following.   -INR subtherapeutic. Coumadin restarted 2/5 at low dose with plans to watch INR rise slowly. PharmD to adjust dosing  -Goal Na+ > 135, slowly trickled down days after stopping hypertonic saline. Salt tabs given prn. Will monitor for improvement with increased diuresis.  -Keppra started as AED. EEG negative for seizures.  -Transcranial dopplers done 1/31 without evidence of vasospasm.  -Continue q1h neurochecks.    Acute respiratory failure requiring reintubation  - Extubated 1/28 and on RA  - Tx'd with remdesivir which was since discontinued. Asymptomatic from covid perspective    LVAD (left ventricular assist device) present  -S/P DT HM3 1/13/21  -Current speed 5000  -INR goal 2.0-3.0. Current INR subtherapeutic. Coumadin restarted as above.  -ASA stopped.   -LDH stable    Procedure: Device Interrogation Including analysis of device parameters  Current Settings: Ventricular Assist Device  Review of device function is stable/unstable stable         TXP LVAD INTERROGATIONS 2/8/2022 2/8/2022 2/8/2022 2/8/2022 2/8/2022 2/8/2022 2/8/2022   Type HeartMate3 HeartMate3 HeartMate3 HeartMate3 HeartMate3 HeartMate3 HeartMate3   Flow 4.3 4.7 4.3 4.4 4.5 4.5 4.0   Speed 5000 5000 5000 5000 5000 5000 5000   PI 4.6 2.8 3.6 3.3 2.7 3.4 5.8   Power (Edwards) 3.3 3.4 3.3 3.3 3.3 3.4 3.3   LSL 4600 4600 4600 4600 4600 4600 4600   Pulsatility Intermittent pulse Intermittent pulse Intermittent pulse Intermittent pulse Intermittent pulse Intermittent pulse Intermittent pulse       Type 2 diabetes mellitus without complication  -SSI  ACHS     Essential hypertension  -Levophed off. MAP goal >65  -Continue Lisinopril 10qam/20qhs,  doxazosin 2mg BID initiated but discontinued yesterday as BP controlled with adjust  lisinopril dosing. Will monitor closely and reinitiate if elevated Bps return    Uninterrupted Critical Care/Counseling Time (not including procedures): 60 minutes    Valentina Jose PA-C  Heart Transplant  [facility] - Cardiac Intensive Care

## 2022-02-08 NOTE — SUBJECTIVE & OBJECTIVE
Interval History: SAH PBD 12, continuing warfarin today, stable on exam     Medications:  Continuous Infusions:   sodium chloride 0.9% 5 mL/hr (02/08/22 0914)     Scheduled Meds:   atorvastatin  80 mg Oral Daily    baclofen  10 mg Oral TID    famotidine  20 mg Oral BID    furosemide  80 mg Oral BID    lisinopriL  10 mg Oral Daily    lisinopriL  20 mg Oral QHS    magnesium oxide  400 mg Oral BID    polyethylene glycol  17 g Oral Daily    senna-docusate 8.6-50 mg  1 tablet Oral Daily    sodium chloride  1 g Oral Daily    warfarin  4 mg Oral Daily     PRN Meds:sodium chloride, acetaminophen, dextrose 10%, glucagon (human recombinant), insulin aspart U-100, oxyCODONE-acetaminophen     Review of Systems    Objective:     Weight: 82.3 kg (181 lb 7 oz)  Body mass index is 23.3 kg/m².  Vital Signs (Most Recent):  Temp: 98.2 °F (36.8 °C) (02/08/22 1101)  Pulse: (!) 58 (02/08/22 1300)  Resp: 18 (02/08/22 1305)  BP: (!) 70/0 (02/08/22 1101)  SpO2: 100 % (02/08/22 1300) Vital Signs (24h Range):  Temp:  [98.2 °F (36.8 °C)-99.6 °F (37.6 °C)] 98.2 °F (36.8 °C)  Pulse:  [] 58  Resp:  [7-33] 18  SpO2:  [96 %-100 %] 100 %  BP: (68-88)/(0) 70/0  Arterial Line BP: ()/(57-86) 87/72     Date 02/08/22 0700 - 02/09/22 0659   Shift 4870-4865 7977-6936 9995-3784 24 Hour Total   INTAKE   P.O. 240   240   I.V.(mL/kg) 43.1(0.5)   43.1(0.5)   Shift Total(mL/kg) 283.1(3.4)   283.1(3.4)   OUTPUT   Urine(mL/kg/hr) 200   200   Shift Total(mL/kg) 200(2.4)   200(2.4)   Weight (kg) 82.3 82.3 82.3 82.3              NG/OG Tube 01/27/22 2140 18 Fr. Center mouth (Active)   $ NG/OG Tube Placement Complete 01/27/22 2130   Placement Check placement verified by x-ray;placement verified by aspirate characteristics 01/27/22 2309   Tolerance no signs/symptoms of discomfort 01/27/22 2309   Securement secured to commercial device 01/27/22 2309   Clamp Status/Tolerance clamped 01/27/22 2309   Suction Setting/Drainage Method suction at the  "bedside 01/27/22 2309   Insertion Site Appearance no redness, warmth, tenderness, skin breakdown, drainage 01/27/22 2309   Flush/Irrigation flushed w/;water;no resistance met 01/27/22 2309            Urethral Catheter 01/27/22 1858 (Active)   $ Duarte Insertion Complete 01/27/22 1947   Site Assessment Clean;Intact 01/28/22 0300   Collection Container Urimeter 01/28/22 0300   Securement Method secured to top of thigh w/ adhesive device 01/28/22 0300   Catheter Care Performed no 01/28/22 0300   Reason for Continuing Urinary Catheterization Critically ill in ICU and requiring hourly monitoring of intake/output 01/28/22 0300   CAUTI Prevention Bundle StatLock in place w 1" slack;Intact seal between catheter & drainage tubing;Drainage bag/urimeter off the floor;Sheeting clip in use;No dependent loops or kinks;Drainage bag/urimeter not overfilled (<2/3 full);Drainage bag/urimeter below bladder 01/28/22 0300   Output (mL) 55 mL 01/28/22 1000     Physical Exam:  General: AOx3, GCS [id number]  CNII-XII: Intact on fine exam, PERRL, visual fields grossly intact, EOMi, facial sensation preserved, no facial assymetry, tongue/uvula/palate midline, shoulder shrug equal, no pronator drift  Extremities: 5/5 motor throughout, sensorium intact throughout, coordination intact throughout    General: Awake, Alert, Oriented  Head: Non-traumatic, normocephalic  Eyes: Pupils equal, EOMi  Neck: Supple, normal ROM, no tenderness to palpation  CVS: Normal rate and rhythm, distal pulses present  Pulm: Symmetric expansion, no respiratory distress  GI: Abdomen nondistended, nontender  MSK: Moves all extremities without restriction, atraumatic  Skin: Dry, intact  Psych: Normal thought content and cognition    Significant Labs:  Recent Labs   Lab 02/07/22  0232 02/08/22  0444   * 137*    131*   K 4.0 3.7    102   CO2 24 22*   BUN 17 15   CREATININE 0.7 0.7   CALCIUM 8.4* 8.5*   MG 1.7 1.7     Recent Labs   Lab 02/07/22  0232 " 02/08/22  0444   WBC 7.51 7.70   HGB 8.9* 9.0*   HCT 28.6* 28.5*    153     Recent Labs   Lab 02/07/22  0511 02/08/22  0444   INR 1.2 1.2   APTT 26.9 29.1     Microbiology Results (last 7 days)     ** No results found for the last 168 hours. **        All pertinent labs from the last 24 hours have been reviewed.    Significant Diagnostics:  All significant diagnostics reviewed by NSGY resident

## 2022-02-08 NOTE — PROGRESS NOTES
Interdisciplinary Rounds Report:   Attended interdisciplinary rounds with the [facility]/CTS services including the LVAD Coordinators, social workers, cardiologists, surgeons,  PT/OT/Speech, dietician, and unit charge nurses. Discussed patient status, plan of care, goals of care, including DC date, and post discharge needs. Plan of care will be discussed with the patient and/or family per the physician while rounding on the floor. This is a recurring meeting that is medically and socially necessary to collaborate with the interdisciplinary team to assist patient needs and safe discharge.

## 2022-02-08 NOTE — ASSESSMENT & PLAN NOTE
66M PMHx cardiomyopathy, s/p LVAD on coumadin and CAD s/p stenting on ASA, presenting after syncopal event and fall with hitting head, found to have bilateral SDH R>L on CT at OSH, rpt CT with new cisternal SAH, negative aneurysm or vascular malformation on CTA, likely nonaneurysmal perimesencephalic SAH    --Admitted to CICU; CICU team primary   -continued q1h neurochecks in ICU  --All labs and diagnostics reviewed   -CTH 1/27 OSH: bilat aucte on chronic SDH, R > L, no shift, no hydro, cisterns open.    -CTH 1/27 rpt: new SAH in interpeduncular and quadrigeminal cisterns   -CTA 1/27: negative for underlying vascular lesion   -CTH 1/29: stable       -CTH 2/1: SAH essentially resolved, convexity subdurals improved. vents stable   -CTA 2/4: no vascular anomaly or vasospasm present, stable SDH & resolved SAH   -CTH 2/7: stable bleed  --Completed post-acute SAH treatment; continued primary medical management per CICU  --SBP <160  --Na > 135  --OK to continue coumadin at this time for therapeutic dosing; please rpt CTH when therapeutic  --TEDs/SCDs  --OK for diet per primary team  --NSGY will follow peripherally; please call once patient has therapeutic INR with CTH  --All further work-up/management per CICU    Dispo: per primary team

## 2022-02-08 NOTE — ASSESSMENT & PLAN NOTE
66M PMHx cardiomyopathy, s/p LVAD on coumadin and CAD s/p stenting on ASA, presenting after syncopal event and fall with hitting head, found to have bilateral SDH R>L on CT at OSH, rpt CT with new cisternal SAH, negative aneurysm or vascular malformation on CTA, likely nonaneurysmal perimesencephalic SAH    --Admitted to CICU; CICU team primary   -continued q1h neurochecks in ICU  --All labs and diagnostics reviewed   -CTH 1/27 OSH: bilat aucte on chronic SDH, R > L, no shift, no hydro, cisterns open.    -CTH 1/27 rpt: new SAH in interpeduncular and quadrigeminal cisterns   -CTA 1/27: negative for underlying vascular lesion   -CTH 1/29: stable       -CTH 2/1: SAH essentially resolved, convexity subdurals improved. vents stable   -CTA 2/4: no vascular anomaly or vasospasm present, stable SDH & resolved SAH   -CTH 2/7: stable bleed  --Completed post-acute SAH treatment; continued primary medical management per CICU  --SBP <160  --Na > 135  --OK to continue coumadin at this time; please rpt CTH when therapeutic  --TEDs/SCDs  --OK for diet per primary team  --All further work-up/management per CICU  --Please call NSGY with any acute exam changes    Dispo: per primary team

## 2022-02-08 NOTE — CONSULTS
Inpatient consult to Physical Medicine Rehab  Consult performed by: Ayesha Connolly NP  Consult ordered by: Pete Baker MD  Reason for consult: Assess rehab needs      Reviewed patient history and current admission.  Rehab team following.  Full consult to follow.    JEFERSON Ng, FNP-C  Physical Medicine & Rehabilitation   02/08/2022

## 2022-02-08 NOTE — PROGRESS NOTES
[facility] - Cardiac Intensive Care  Neurosurgery  Progress Note    Subjective:     History of Present Illness: 66M PMHx cardiomyopathy, s/p LVAD on coumadin and CAD s/p stenting on ASA, presenting after fall, consulted to NSGY for SDH. Pt had syncopal event earlier today with prodrome of dizziness, fell, hit head, went to OSH for workup and was complaining of occipital H/A. At OSH, pt had acute neurologic decline, had to be intubated, and presents to [facility] intubated, sedated and with no motor activity. However, 2 hours after presentation to [facility], pt's neurologic status has improved to awake, alert, and following commands briskly with minor headache.       Post-Op Info:  * No surgery found *         Interval History: SAH PBD 12, continuing warfarin today, stable on exam     Medications:  Continuous Infusions:   sodium chloride 0.9% 5 mL/hr (02/08/22 0914)     Scheduled Meds:   atorvastatin  80 mg Oral Daily    baclofen  10 mg Oral TID    famotidine  20 mg Oral BID    furosemide  80 mg Oral BID    lisinopriL  10 mg Oral Daily    lisinopriL  20 mg Oral QHS    magnesium oxide  400 mg Oral BID    polyethylene glycol  17 g Oral Daily    senna-docusate 8.6-50 mg  1 tablet Oral Daily    sodium chloride  1 g Oral Daily    warfarin  4 mg Oral Daily     PRN Meds:sodium chloride, acetaminophen, dextrose 10%, glucagon (human recombinant), insulin aspart U-100, oxyCODONE-acetaminophen     Review of Systems    Objective:     Weight: 82.3 kg (181 lb 7 oz)  Body mass index is 23.3 kg/m².  Vital Signs (Most Recent):  Temp: 98.2 °F (36.8 °C) (02/08/22 1101)  Pulse: (!) 58 (02/08/22 1300)  Resp: 18 (02/08/22 1305)  BP: (!) 70/0 (02/08/22 1101)  SpO2: 100 % (02/08/22 1300) Vital Signs (24h Range):  Temp:  [98.2 °F (36.8 °C)-99.6 °F (37.6 °C)] 98.2 °F (36.8 °C)  Pulse:  [] 58  Resp:  [7-33] 18  SpO2:  [96 %-100 %] 100 %  BP: (68-88)/(0) 70/0  Arterial Line BP: ()/(57-86) 87/72     Date 02/08/22 0700 - 02/09/22  "0659   Shift 5478-9004 0604-9041 9978-2001 24 Hour Total   INTAKE   P.O. 240   240   I.V.(mL/kg) 43.1(0.5)   43.1(0.5)   Shift Total(mL/kg) 283.1(3.4)   283.1(3.4)   OUTPUT   Urine(mL/kg/hr) 200   200   Shift Total(mL/kg) 200(2.4)   200(2.4)   Weight (kg) 82.3 82.3 82.3 82.3              NG/OG Tube 01/27/22 2140 18 Fr. Center mouth (Active)   $ NG/OG Tube Placement Complete 01/27/22 2130   Placement Check placement verified by x-ray;placement verified by aspirate characteristics 01/27/22 2309   Tolerance no signs/symptoms of discomfort 01/27/22 2309   Securement secured to commercial device 01/27/22 2309   Clamp Status/Tolerance clamped 01/27/22 2309   Suction Setting/Drainage Method suction at the bedside 01/27/22 2309   Insertion Site Appearance no redness, warmth, tenderness, skin breakdown, drainage 01/27/22 2309   Flush/Irrigation flushed w/;water;no resistance met 01/27/22 2309            Urethral Catheter 01/27/22 1858 (Active)   $ Duarte Insertion Complete 01/27/22 1947   Site Assessment Clean;Intact 01/28/22 0300   Collection Container Urimeter 01/28/22 0300   Securement Method secured to top of thigh w/ adhesive device 01/28/22 0300   Catheter Care Performed no 01/28/22 0300   Reason for Continuing Urinary Catheterization Critically ill in ICU and requiring hourly monitoring of intake/output 01/28/22 0300   CAUTI Prevention Bundle StatLock in place w 1" slack;Intact seal between catheter & drainage tubing;Drainage bag/urimeter off the floor;Sheeting clip in use;No dependent loops or kinks;Drainage bag/urimeter not overfilled (<2/3 full);Drainage bag/urimeter below bladder 01/28/22 0300   Output (mL) 55 mL 01/28/22 1000     Physical Exam:  General: AOx3, GCS E4V5M6  CNII-XII: Intact on fine exam, PERRL, visual fields grossly intact, EOMi, facial sensation preserved, no facial assymetry, tongue/uvula/palate midline, shoulder shrug equal, no pronator drift  Extremities: 5/5 motor throughout, sensorium intact " throughout, coordination intact throughout    General: Awake, Alert, Oriented  Head: Non-traumatic, normocephalic  Eyes: Pupils equal, EOMi  Neck: Supple, normal ROM, no tenderness to palpation  CVS: Normal rate and rhythm, distal pulses present  Pulm: Symmetric expansion, no respiratory distress  GI: Abdomen nondistended, nontender  MSK: Moves all extremities without restriction, atraumatic  Skin: Dry, intact  Psych: Normal thought content and cognition    Significant Labs:  Recent Labs   Lab 02/07/22  0232 02/08/22  0444   * 137*    131*   K 4.0 3.7    102   CO2 24 22*   BUN 17 15   CREATININE 0.7 0.7   CALCIUM 8.4* 8.5*   MG 1.7 1.7     Recent Labs   Lab 02/07/22  0232 02/08/22  0444   WBC 7.51 7.70   HGB 8.9* 9.0*   HCT 28.6* 28.5*    153     Recent Labs   Lab 02/07/22  0511 02/08/22  0444   INR 1.2 1.2   APTT 26.9 29.1     Microbiology Results (last 7 days)     ** No results found for the last 168 hours. **        All pertinent labs from the last 24 hours have been reviewed.    Significant Diagnostics:  All significant diagnostics reviewed by NSGY resident      Assessment/Plan:     Subarachnoid bleed  66M PMHx cardiomyopathy, s/p LVAD on coumadin and CAD s/p stenting on ASA, presenting after syncopal event and fall with hitting head, found to have bilateral SDH R>L on CT at OSH, rpt CT with new cisternal SAH, negative aneurysm or vascular malformation on CTA, likely nonaneurysmal perimesencephalic SAH    --Admitted to CICU; CICU team primary   -continued q1h neurochecks in ICU  --All labs and diagnostics reviewed   -CTH 1/27 OSH: bilat aucte on chronic SDH, R > L, no shift, no hydro, cisterns open.    -CTH 1/27 rpt: new SAH in interpeduncular and quadrigeminal cisterns   -CTA 1/27: negative for underlying vascular lesion   -CTH 1/29: stable       -CTH 2/1: SAH essentially resolved, convexity subdurals improved. vents stable   -CTA 2/4: no vascular anomaly or vasospasm present, stable  SDH & resolved SAH   -CTH 2/7: stable bleed  --Completed post-acute SAH treatment; continued primary medical management per CICU  --SBP <160  --Na > 135  --OK to continue coumadin at this time for therapeutic dosing; please rpt CTH when therapeutic  --TEDs/SCDs  --OK for diet per primary team  --NSGY will follow peripherally; please call once patient has therapeutic INR with CTH  --All further work-up/management per CICU    Dispo: per primary team           Venkata Restrepo MD  Neurosurgery  [facility] - Cardiac Intensive Care

## 2022-02-08 NOTE — PROGRESS NOTES
"02/08/2022  Micah Velez Jr    Current provider:  Pete Baker MD    Device interrogation:  TXP LVAD INTERROGATIONS 2/8/2022 2/8/2022 2/8/2022 2/8/2022 2/8/2022 2/8/2022 2/8/2022   Type HeartMate3 HeartMate3 HeartMate3 HeartMate3 HeartMate3 HeartMate3 HeartMate3   Flow 4.5 4.5 4.0 4.2 4.3 4.3 4.1   Speed 5000 5000 5000 5000 5000 5000 5000   PI 2.7 3.4 5.8 4.6 4.4 4.4 4.8   Power (Edwards) 3.3 3.4 3.3 3.4 4.3 4.3 3.4   LSL 4600 4600 4600 4600 4600 4600 4600   Pulsatility Intermittent pulse Intermittent pulse Intermittent pulse Intermittent pulse Intermittent pulse Intermittent pulse Intermittent pulse          As floor rounding has been requested to be limited during COVID-19 patient quarantine by Infectious Disease and leadership, only "electronic" rounding has been performed on this mechanical assist device patient.  Electronic rounding includes verifying in Epic that current settings and measurements for the device have been documented appropriately and that they are within limits.  Additionally, this rounding verifies that the EQUIPMENT section of the VAD flowsheet is documented in Epic, specifically checking the presence of emergency equipment and controller self test.  Any discrepancies will be related to the care team.    For implantable VADs: Interrogation of Ventricular assist device was performed with analysis of device parameters and review of device function. I have personally reviewed the interrogation findings and agree with findings as stated.     In emergency, the nursing units have been notified to contact the perfusion department either by:  Calling [phone] from 630am to 4pm Mon thru Fri, or by contacting the hospital  from 4pm to 630am and on weekends and asking to speak with the perfusionist on call.    Micah Velez Jr  11:09 AM  "

## 2022-02-08 NOTE — SUBJECTIVE & OBJECTIVE
Interval History: SAH PBD 11, continuing warfarin today, interval CTH stable, neuro stable.     Medications:  Continuous Infusions:   sodium chloride 0.9% 5 mL/hr at 02/07/22 0626     Scheduled Meds:   atorvastatin  80 mg Oral Daily    baclofen  10 mg Oral TID    famotidine  20 mg Oral BID    furosemide  40 mg Oral BID    lisinopriL  10 mg Oral Daily    lisinopriL  20 mg Oral QHS    magnesium oxide  400 mg Oral BID    warfarin  1 mg Oral Daily     PRN Meds:sodium chloride, acetaminophen, dextrose 10%, glucagon (human recombinant), insulin aspart U-100, oxyCODONE-acetaminophen     Review of Systems    Objective:     Weight: 82.3 kg (181 lb 7 oz)  Body mass index is 23.3 kg/m².  Vital Signs (Most Recent):  Temp: 98.3 °F (36.8 °C) (02/07/22 1501)  Pulse: (!) 123 (02/07/22 1800)  Resp: 10 (02/07/22 1800)  BP: (!) 68/0 (02/07/22 1501)  SpO2: 97 % (02/07/22 1800) Vital Signs (24h Range):  Temp:  [97.6 °F (36.4 °C)-98.3 °F (36.8 °C)] 98.3 °F (36.8 °C)  Pulse:  [] 123  Resp:  [8-33] 10  SpO2:  [95 %-99 %] 97 %  BP: (68-86)/(0) 68/0  Arterial Line BP: ()/(51-80) 98/74     Date 02/07/22 0700 - 02/08/22 0659   Shift 6661-5012 1260-6425 6380-5991 24 Hour Total   INTAKE   P.O. 480 240  720   I.V.(mL/kg) 50(0.6)   50(0.6)   Shift Total(mL/kg) 530(6.4) 240(2.9)  770(9.4)   OUTPUT   Urine(mL/kg/hr) 375(0.6)   375   Shift Total(mL/kg) 375(4.6)   375(4.6)   Weight (kg) 82.3 82.3 82.3 82.3              NG/OG Tube 01/27/22 2140 18 Fr. Center mouth (Active)   $ NG/OG Tube Placement Complete 01/27/22 2130   Placement Check placement verified by x-ray;placement verified by aspirate characteristics 01/27/22 2309   Tolerance no signs/symptoms of discomfort 01/27/22 2309   Securement secured to commercial device 01/27/22 2309   Clamp Status/Tolerance clamped 01/27/22 2309   Suction Setting/Drainage Method suction at the bedside 01/27/22 2309   Insertion Site Appearance no redness, warmth, tenderness, skin breakdown,  "drainage 01/27/22 2309   Flush/Irrigation flushed w/;water;no resistance met 01/27/22 2309            Urethral Catheter 01/27/22 1858 (Active)   $ Duarte Insertion Complete 01/27/22 1947   Site Assessment Clean;Intact 01/28/22 0300   Collection Container Urimeter 01/28/22 0300   Securement Method secured to top of thigh w/ adhesive device 01/28/22 0300   Catheter Care Performed no 01/28/22 0300   Reason for Continuing Urinary Catheterization Critically ill in ICU and requiring hourly monitoring of intake/output 01/28/22 0300   CAUTI Prevention Bundle StatLock in place w 1" slack;Intact seal between catheter & drainage tubing;Drainage bag/urimeter off the floor;Sheeting clip in use;No dependent loops or kinks;Drainage bag/urimeter not overfilled (<2/3 full);Drainage bag/urimeter below bladder 01/28/22 0300   Output (mL) 55 mL 01/28/22 1000     Physical Exam:  General: AOx3, GCS E4V5M6  CNII-XII: Intact on fine exam, PERRL, visual fields grossly intact, EOMi, facial sensation preserved, no facial assymetry, tongue/uvula/palate midline, shoulder shrug equal, no pronator drift  Extremities: 5/5 motor throughout, sensorium intact throughout, coordination intact throughout    General: Awake, Alert, Oriented  Head: Non-traumatic, normocephalic  Eyes: Pupils equal, EOMi  Neck: Supple, normal ROM, no tenderness to palpation  CVS: Normal rate and rhythm, distal pulses present  Pulm: Symmetric expansion, no respiratory distress  GI: Abdomen nondistended, nontender  MSK: Moves all extremities without restriction, atraumatic  Skin: Dry, intact  Psych: Normal thought content and cognition    Significant Labs:  Recent Labs   Lab 02/06/22  0333 02/06/22  1011 02/07/22  0232    178* 117*    135* 137   K 3.7 4.0 4.0    107 107   CO2 20* 23 24   BUN 13 14 17   CREATININE 0.7 0.8 0.7   CALCIUM 8.1* 8.5* 8.4*   MG 1.7 1.7 1.7     Recent Labs   Lab 02/06/22  0333 02/07/22  0232   WBC 7.82 7.51   HGB 9.4* 8.9*   HCT " 29.5* 28.6*    169     Recent Labs   Lab 02/06/22  0333 02/07/22  0511   INR 1.2 1.2   APTT 28.8 26.9     Microbiology Results (last 7 days)     ** No results found for the last 168 hours. **        All pertinent labs from the last 24 hours have been reviewed.    Significant Diagnostics:  All significant diagnostics reviewed by NSGY resident    Physical Exam      Neurosurgery Physical Exam

## 2022-02-08 NOTE — PLAN OF CARE
Pt doing well today, still having slight h/A described as dull pain 3-4/10, some relief with prn percocet po, vss throughout the day, lvad hm3 intact with 4 l/m flow, no other complaints, neuro intact, continue close monitoring in ICU awaiting neuro clearance, diuresing well, all electrolytes wnl, plan of care reviewed with pt/family per pt, will continue to monitor

## 2022-02-08 NOTE — SUBJECTIVE & OBJECTIVE
Interval History: No acute events overnight. Bps have improved with lisinopril 10mg qam/20mg qhs. Cardura discontinued. INR 1.2, PharmD to dose coumadin. Continuing ICU level care with frequent neurochecks as we trend towards therapeutic INR. JVD to around midneck estimated to CVP of about 11-12, will increase po lasix to achieve negative fluid balance by tomorrow, repeat limited echo to eval RV to determine addition of BB. Replacing lytes. Will give salt tab for Na 131.     Continuous Infusions:   sodium chloride 0.9% 5 mL/hr (02/08/22 0914)     Scheduled Meds:   atorvastatin  80 mg Oral Daily    baclofen  10 mg Oral TID    famotidine  20 mg Oral BID    furosemide  80 mg Oral BID    lisinopriL  10 mg Oral Daily    lisinopriL  20 mg Oral QHS    magnesium oxide  400 mg Oral BID    polyethylene glycol  17 g Oral Daily    senna-docusate 8.6-50 mg  1 tablet Oral Daily    sodium chloride  1 g Oral Daily    warfarin  4 mg Oral Daily     PRN Meds:sodium chloride, acetaminophen, dextrose 10%, glucagon (human recombinant), insulin aspart U-100, oxyCODONE-acetaminophen    Review of patient's allergies indicates:   Allergen Reactions    Pcn [penicillins] Hives     Objective:     Vital Signs (Most Recent):  Temp: 98.2 °F (36.8 °C) (02/08/22 1101)  Pulse: 63 (02/08/22 1400)  Resp: 17 (02/08/22 1400)  BP: (!) 70/0 (02/08/22 1101)  SpO2: 100 % (02/08/22 1400) Vital Signs (24h Range):  Temp:  [98.2 °F (36.8 °C)-99.6 °F (37.6 °C)] 98.2 °F (36.8 °C)  Pulse:  [] 63  Resp:  [7-33] 17  SpO2:  [96 %-100 %] 100 %  BP: (68-88)/(0) 70/0  Arterial Line BP: ()/(56-86) 89/56     Patient Vitals for the past 72 hrs (Last 3 readings):   Weight   02/07/22 1300 82.3 kg (181 lb 7 oz)   02/07/22 0300 82.3 kg (181 lb 7 oz)   02/06/22 0300 82 kg (180 lb 12.4 oz)     Body mass index is 23.3 kg/m².      Intake/Output Summary (Last 24 hours) at 2/8/2022 1440  Last data filed at 2/8/2022 1200  Gross per 24 hour   Intake 843.1 ml    Output 1330 ml   Net -486.9 ml           Telemetry: NSR     Physical Exam  Constitutional:       Appearance: Normal appearance.   HENT:      Head: Normocephalic.      Nose: Nose normal.   Neck:      Comments: JVP about midneck at 45 degrees, estimated CVP of 11-12  Cardiovascular:      Rate and Rhythm: Normal rate and regular rhythm.      Pulses: Normal pulses.      Heart sounds: Normal heart sounds.      Comments: VAD hum present  Pulmonary:      Effort: Pulmonary effort is normal.      Breath sounds: Normal breath sounds.   Abdominal:      General: Abdomen is flat. Bowel sounds are normal.      Palpations: Abdomen is soft.   Musculoskeletal:         General: Normal range of motion.      Cervical back: Normal range of motion.   Skin:     General: Skin is warm.      Capillary Refill: Capillary refill takes less than 2 seconds.   Neurological:      General: No focal deficit present.      Mental Status: He is alert and oriented to person, place, and time.       Significant Labs:  CBC:  Recent Labs   Lab 02/06/22  0333 02/07/22  0232 02/08/22  0444   WBC 7.82 7.51 7.70   RBC 3.26* 3.11* 3.09*   HGB 9.4* 8.9* 9.0*   HCT 29.5* 28.6* 28.5*    169 153   MCV 91 92 92   MCH 28.8 28.6 29.1   MCHC 31.9* 31.1* 31.6*     BNP:  Recent Labs   Lab 02/02/22  0312 02/04/22  0306 02/07/22  0232   * 412* 322*     CMP:  Recent Labs   Lab 02/04/22  0306 02/04/22  0306 02/04/22  0417 02/04/22  0920 02/06/22  1011 02/07/22  0232 02/08/22  0444      < > 119*   < > 178* 117* 137*   CALCIUM 6.7*   < > 8.2*   < > 8.5* 8.4* 8.5*   ALBUMIN 1.8*  --  2.2*  --   --  2.3*  --    PROT 5.6*  --  6.8  --   --  6.6  --       < > 139   < > 135* 137 131*   K 3.1*   < > 3.7   < > 4.0 4.0 3.7   CO2 18*   < > 22*   < > 23 24 22*   *   < > 113*   < > 107 107 102   BUN 12   < > 13   < > 14 17 15   CREATININE 0.6   < > 0.7   < > 0.8 0.7 0.7   ALKPHOS 80  --  95  --   --  119  --    ALT 5*  --  8*  --   --  7*  --    AST 13   --  17  --   --  13  --    BILITOT 1.1*  --  1.3*  --   --  1.1*  --     < > = values in this interval not displayed.      Coagulation:   Recent Labs   Lab 02/06/22  0333 02/07/22  0511 02/08/22  0444   INR 1.2 1.2 1.2   APTT 28.8 26.9 29.1     LDH:  Recent Labs   Lab 02/06/22  0333 02/07/22  0232 02/08/22  0444    197 240     Microbiology:  Microbiology Results (last 7 days)     ** No results found for the last 168 hours. **        I have reviewed all pertinent labs within the past 24 hours.    Estimated Creatinine Clearance: 120.7 mL/min (based on SCr of 0.7 mg/dL).    Diagnostic Results:  I have reviewed and interpreted all pertinent imaging results/findings within the past 24 hours.

## 2022-02-08 NOTE — PROGRESS NOTES
[facility] - [facility]  Neurosurgery  Progress Note    Subjective:     History of Present Illness: 66M PMHx cardiomyopathy, s/p LVAD on coumadin and CAD s/p stenting on ASA, presenting after fall, consulted to NSGY for SDH. Pt had syncopal event earlier today with prodrome of dizziness, fell, hit head, went to OSH for workup and was complaining of occipital H/A. At OSH, pt had acute neurologic decline, had to be intubated, and presents to [facility] intubated, sedated and with no motor activity. However, 2 hours after presentation to [facility], pt's neurologic status has improved to awake, alert, and following commands briskly with minor headache.       Post-Op Info:  * No surgery found *         Interval History: SAH PBD 11, continuing warfarin today, interval CTH stable, neuro stable.     Medications:  Continuous Infusions:   sodium chloride 0.9% 5 mL/hr at 02/07/22 0626     Scheduled Meds:   atorvastatin  80 mg Oral Daily    baclofen  10 mg Oral TID    famotidine  20 mg Oral BID    furosemide  40 mg Oral BID    lisinopriL  10 mg Oral Daily    lisinopriL  20 mg Oral QHS    magnesium oxide  400 mg Oral BID    warfarin  1 mg Oral Daily     PRN Meds:sodium chloride, acetaminophen, dextrose 10%, glucagon (human recombinant), insulin aspart U-100, oxyCODONE-acetaminophen     Review of Systems    Objective:     Weight: 82.3 kg (181 lb 7 oz)  Body mass index is 23.3 kg/m².  Vital Signs (Most Recent):  Temp: 98.3 °F (36.8 °C) (02/07/22 1501)  Pulse: (!) 123 (02/07/22 1800)  Resp: 10 (02/07/22 1800)  BP: (!) 68/0 (02/07/22 1501)  SpO2: 97 % (02/07/22 1800) Vital Signs (24h Range):  Temp:  [97.6 °F (36.4 °C)-98.3 °F (36.8 °C)] 98.3 °F (36.8 °C)  Pulse:  [] 123  Resp:  [8-33] 10  SpO2:  [95 %-99 %] 97 %  BP: (68-86)/(0) 68/0  Arterial Line BP: ()/(51-80) 98/74     Date 02/07/22 0700 - 02/08/22 0659   Shift 8090-3247 3453-6878 0943-6822 24 Hour Total   INTAKE   P.O. 480 240  720   I.V.(mL/kg)  "50(0.6)   50(0.6)   Shift Total(mL/kg) 530(6.4) 240(2.9)  770(9.4)   OUTPUT   Urine(mL/kg/hr) 375(0.6)   375   Shift Total(mL/kg) 375(4.6)   375(4.6)   Weight (kg) 82.3 82.3 82.3 82.3              NG/OG Tube 01/27/22 2140 18 Fr. Center mouth (Active)   $ NG/OG Tube Placement Complete 01/27/22 2130   Placement Check placement verified by x-ray;placement verified by aspirate characteristics 01/27/22 2309   Tolerance no signs/symptoms of discomfort 01/27/22 2309   Securement secured to commercial device 01/27/22 2309   Clamp Status/Tolerance clamped 01/27/22 2309   Suction Setting/Drainage Method suction at the bedside 01/27/22 2309   Insertion Site Appearance no redness, warmth, tenderness, skin breakdown, drainage 01/27/22 2309   Flush/Irrigation flushed w/;water;no resistance met 01/27/22 2309            Urethral Catheter 01/27/22 1858 (Active)   $ Duarte Insertion Complete 01/27/22 1947   Site Assessment Clean;Intact 01/28/22 0300   Collection Container Urimeter 01/28/22 0300   Securement Method secured to top of thigh w/ adhesive device 01/28/22 0300   Catheter Care Performed no 01/28/22 0300   Reason for Continuing Urinary Catheterization Critically ill in ICU and requiring hourly monitoring of intake/output 01/28/22 0300   CAUTI Prevention Bundle StatLock in place w 1" slack;Intact seal between catheter & drainage tubing;Drainage bag/urimeter off the floor;Sheeting clip in use;No dependent loops or kinks;Drainage bag/urimeter not overfilled (<2/3 full);Drainage bag/urimeter below bladder 01/28/22 0300   Output (mL) 55 mL 01/28/22 1000     Physical Exam:  General: AOx3, GCS [id number]  CNII-XII: Intact on fine exam, PERRL, visual fields grossly intact, EOMi, facial sensation preserved, no facial assymetry, tongue/uvula/palate midline, shoulder shrug equal, no pronator drift  Extremities: 5/5 motor throughout, sensorium intact throughout, coordination intact throughout    General: Awake, Alert, Oriented  Head: " Non-traumatic, normocephalic  Eyes: Pupils equal, EOMi  Neck: Supple, normal ROM, no tenderness to palpation  CVS: Normal rate and rhythm, distal pulses present  Pulm: Symmetric expansion, no respiratory distress  GI: Abdomen nondistended, nontender  MSK: Moves all extremities without restriction, atraumatic  Skin: Dry, intact  Psych: Normal thought content and cognition    Significant Labs:  Recent Labs   Lab 02/06/22  0333 02/06/22  1011 02/07/22  0232    178* 117*    135* 137   K 3.7 4.0 4.0    107 107   CO2 20* 23 24   BUN 13 14 17   CREATININE 0.7 0.8 0.7   CALCIUM 8.1* 8.5* 8.4*   MG 1.7 1.7 1.7     Recent Labs   Lab 02/06/22  0333 02/07/22  0232   WBC 7.82 7.51   HGB 9.4* 8.9*   HCT 29.5* 28.6*    169     Recent Labs   Lab 02/06/22  0333 02/07/22  0511   INR 1.2 1.2   APTT 28.8 26.9     Microbiology Results (last 7 days)     ** No results found for the last 168 hours. **        All pertinent labs from the last 24 hours have been reviewed.    Significant Diagnostics:  All significant diagnostics reviewed by NSGY resident    Physical Exam      Neurosurgery Physical Exam        Assessment/Plan:     Subarachnoid bleed  66M PMHx cardiomyopathy, s/p LVAD on coumadin and CAD s/p stenting on ASA, presenting after syncopal event and fall with hitting head, found to have bilateral SDH R>L on CT at OSH, rpt CT with new cisternal SAH, negative aneurysm or vascular malformation on CTA, likely nonaneurysmal perimesencephalic SAH    --Admitted to CICU; CICU team primary   -continued q1h neurochecks in ICU  --All labs and diagnostics reviewed   -CTH 1/27 OSH: bilat aucte on chronic SDH, R > L, no shift, no hydro, cisterns open.    -CTH 1/27 rpt: new SAH in interpeduncular and quadrigeminal cisterns   -CTA 1/27: negative for underlying vascular lesion   -CTH 1/29: stable       -CTH 2/1: SAH essentially resolved, convexity subdurals improved. vents stable   -CTA 2/4: no vascular anomaly or vasospasm  present, stable SDH & resolved SAH   -CT 2/7: stable bleed  --Completed post-acute SAH treatment; continued primary medical management per CICU  --SBP <160  --Na > 135  --OK to continue coumadin at this time; please rpt CTH when therapeutic  --TEDs/SCDs  --OK for diet per primary team  --All further work-up/management per CICU  --Please call NSGY with any acute exam changes    Dispo: per primary team           Ace Robertson MD  Neurosurgery  [facility] - [facility]

## 2022-02-08 NOTE — ASSESSMENT & PLAN NOTE
-Patient had a traumatic subdural, subarachnoid hemorrhage after fall on 1/27. Currently no focal deficits  -NCC signed off. NSGY following.   -INR subtherapeutic. Coumadin restarted 2/5 at low dose with plans to watch INR rise slowly. PharmD to adjust dosing  -Goal Na+ > 135, slowly trickled down days after stopping hypertonic saline. Salt tabs given prn. Will monitor for improvement with increased diuresis.  -Keppra started as AED. EEG negative for seizures.  -Transcranial dopplers done 1/31 without evidence of vasospasm.  -Continue q1h neurochecks.

## 2022-02-09 LAB
ALBUMIN SERPL BCP-MCNC: 2.5 G/DL (ref 3.5–5.2)
ALP SERPL-CCNC: 125 U/L (ref 55–135)
ALT SERPL W/O P-5'-P-CCNC: <5 U/L (ref 10–44)
ANION GAP SERPL CALC-SCNC: 8 MMOL/L (ref 8–16)
APTT BLDCRRT: 28.5 SEC (ref 21–32)
AST SERPL-CCNC: 12 U/L (ref 10–40)
BASOPHILS # BLD AUTO: 0.03 K/UL (ref 0–0.2)
BASOPHILS NFR BLD: 0.4 % (ref 0–1.9)
BILIRUB DIRECT SERPL-MCNC: 0.7 MG/DL (ref 0.1–0.3)
BILIRUB SERPL-MCNC: 1.3 MG/DL (ref 0.1–1)
BNP SERPL-MCNC: 258 PG/ML (ref 0–99)
BUN SERPL-MCNC: 17 MG/DL (ref 8–23)
CALCIUM SERPL-MCNC: 8.5 MG/DL (ref 8.7–10.5)
CHLORIDE SERPL-SCNC: 101 MMOL/L (ref 95–110)
CO2 SERPL-SCNC: 26 MMOL/L (ref 23–29)
CREAT SERPL-MCNC: 0.8 MG/DL (ref 0.5–1.4)
CRP SERPL-MCNC: 20.3 MG/L (ref 0–8.2)
DIFFERENTIAL METHOD: ABNORMAL
EOSINOPHIL # BLD AUTO: 0.1 K/UL (ref 0–0.5)
EOSINOPHIL NFR BLD: 1.6 % (ref 0–8)
ERYTHROCYTE [DISTWIDTH] IN BLOOD BY AUTOMATED COUNT: 15 % (ref 11.5–14.5)
EST. GFR  (AFRICAN AMERICAN): >60 ML/MIN/1.73 M^2
EST. GFR  (NON AFRICAN AMERICAN): >60 ML/MIN/1.73 M^2
GLUCOSE SERPL-MCNC: 146 MG/DL (ref 70–110)
HCT VFR BLD AUTO: 29.3 % (ref 40–54)
HGB BLD-MCNC: 9.1 G/DL (ref 14–18)
IMM GRANULOCYTES # BLD AUTO: 0.03 K/UL (ref 0–0.04)
IMM GRANULOCYTES NFR BLD AUTO: 0.4 % (ref 0–0.5)
INR PPP: 1.2 (ref 0.8–1.2)
LDH SERPL L TO P-CCNC: 193 U/L (ref 110–260)
LYMPHOCYTES # BLD AUTO: 1.4 K/UL (ref 1–4.8)
LYMPHOCYTES NFR BLD: 17.3 % (ref 18–48)
MAGNESIUM SERPL-MCNC: 1.8 MG/DL (ref 1.6–2.6)
MCH RBC QN AUTO: 28.6 PG (ref 27–31)
MCHC RBC AUTO-ENTMCNC: 31.1 G/DL (ref 32–36)
MCV RBC AUTO: 92 FL (ref 82–98)
MONOCYTES # BLD AUTO: 0.9 K/UL (ref 0.3–1)
MONOCYTES NFR BLD: 11.4 % (ref 4–15)
NEUTROPHILS # BLD AUTO: 5.7 K/UL (ref 1.8–7.7)
NEUTROPHILS NFR BLD: 68.9 % (ref 38–73)
NRBC BLD-RTO: 0 /100 WBC
PHOSPHATE SERPL-MCNC: 2.7 MG/DL (ref 2.7–4.5)
PLATELET # BLD AUTO: 181 K/UL (ref 150–450)
PMV BLD AUTO: 11.6 FL (ref 9.2–12.9)
POCT GLUCOSE: 137 MG/DL (ref 70–110)
POCT GLUCOSE: 165 MG/DL (ref 70–110)
POCT GLUCOSE: 183 MG/DL (ref 70–110)
POTASSIUM SERPL-SCNC: 4 MMOL/L (ref 3.5–5.1)
PREALB SERPL-MCNC: 12 MG/DL (ref 20–43)
PROT SERPL-MCNC: 7.1 G/DL (ref 6–8.4)
PROTHROMBIN TIME: 12.2 SEC (ref 9–12.5)
RBC # BLD AUTO: 3.18 M/UL (ref 4.6–6.2)
SODIUM SERPL-SCNC: 135 MMOL/L (ref 136–145)
WBC # BLD AUTO: 8.21 K/UL (ref 3.9–12.7)

## 2022-02-09 PROCEDURE — 27000248 HC VAD-ADDITIONAL DAY

## 2022-02-09 PROCEDURE — 25000003 PHARM REV CODE 250: Performed by: STUDENT IN AN ORGANIZED HEALTH CARE EDUCATION/TRAINING PROGRAM

## 2022-02-09 PROCEDURE — 86140 C-REACTIVE PROTEIN: CPT | Performed by: HOSPITALIST

## 2022-02-09 PROCEDURE — 85730 THROMBOPLASTIN TIME PARTIAL: CPT | Performed by: HOSPITALIST

## 2022-02-09 PROCEDURE — 85610 PROTHROMBIN TIME: CPT | Performed by: HOSPITALIST

## 2022-02-09 PROCEDURE — 25000003 PHARM REV CODE 250: Performed by: HOSPITALIST

## 2022-02-09 PROCEDURE — 27200188 HC TRANSDUCER, ART ADULT/PEDS

## 2022-02-09 PROCEDURE — 85025 COMPLETE CBC W/AUTO DIFF WBC: CPT | Performed by: HOSPITALIST

## 2022-02-09 PROCEDURE — 80048 BASIC METABOLIC PNL TOTAL CA: CPT | Performed by: HOSPITALIST

## 2022-02-09 PROCEDURE — 84100 ASSAY OF PHOSPHORUS: CPT | Performed by: HOSPITALIST

## 2022-02-09 PROCEDURE — 25000003 PHARM REV CODE 250: Performed by: INTERNAL MEDICINE

## 2022-02-09 PROCEDURE — 93750 PR INTERROGATE VENT ASSIST DEV, IN PERSON, W PHYSICIAN ANALYSIS: ICD-10-PCS | Mod: ,,, | Performed by: INTERNAL MEDICINE

## 2022-02-09 PROCEDURE — 25000003 PHARM REV CODE 250: Performed by: PHYSICIAN ASSISTANT

## 2022-02-09 PROCEDURE — 99222 1ST HOSP IP/OBS MODERATE 55: CPT | Mod: ,,, | Performed by: NURSE PRACTITIONER

## 2022-02-09 PROCEDURE — 63600175 PHARM REV CODE 636 W HCPCS: Performed by: STUDENT IN AN ORGANIZED HEALTH CARE EDUCATION/TRAINING PROGRAM

## 2022-02-09 PROCEDURE — 99291 CRITICAL CARE FIRST HOUR: CPT | Mod: ,,, | Performed by: PHYSICIAN ASSISTANT

## 2022-02-09 PROCEDURE — 93750 INTERROGATION VAD IN PERSON: CPT | Mod: ,,, | Performed by: INTERNAL MEDICINE

## 2022-02-09 PROCEDURE — 83880 ASSAY OF NATRIURETIC PEPTIDE: CPT | Performed by: HOSPITALIST

## 2022-02-09 PROCEDURE — 84134 ASSAY OF PREALBUMIN: CPT | Performed by: HOSPITALIST

## 2022-02-09 PROCEDURE — 99233 SBSQ HOSP IP/OBS HIGH 50: CPT | Mod: ,,, | Performed by: INTERNAL MEDICINE

## 2022-02-09 PROCEDURE — 99222 PR INITIAL HOSPITAL CARE,LEVL II: ICD-10-PCS | Mod: ,,, | Performed by: NURSE PRACTITIONER

## 2022-02-09 PROCEDURE — 94761 N-INVAS EAR/PLS OXIMETRY MLT: CPT

## 2022-02-09 PROCEDURE — 99291 PR CRITICAL CARE, E/M 30-74 MINUTES: ICD-10-PCS | Mod: ,,, | Performed by: PHYSICIAN ASSISTANT

## 2022-02-09 PROCEDURE — C1751 CATH, INF, PER/CENT/MIDLINE: HCPCS

## 2022-02-09 PROCEDURE — 20600001 HC STEP DOWN PRIVATE ROOM

## 2022-02-09 PROCEDURE — 83615 LACTATE (LD) (LDH) ENZYME: CPT | Performed by: HOSPITALIST

## 2022-02-09 PROCEDURE — 80076 HEPATIC FUNCTION PANEL: CPT | Performed by: HOSPITALIST

## 2022-02-09 PROCEDURE — 99233 PR SUBSEQUENT HOSPITAL CARE,LEVL III: ICD-10-PCS | Mod: ,,, | Performed by: INTERNAL MEDICINE

## 2022-02-09 PROCEDURE — 83735 ASSAY OF MAGNESIUM: CPT | Performed by: HOSPITALIST

## 2022-02-09 RX ORDER — MAGNESIUM SULFATE HEPTAHYDRATE 40 MG/ML
2 INJECTION, SOLUTION INTRAVENOUS ONCE
Status: COMPLETED | OUTPATIENT
Start: 2022-02-09 | End: 2022-02-09

## 2022-02-09 RX ADMIN — Medication 400 MG: at 08:02

## 2022-02-09 RX ADMIN — BACLOFEN 10 MG: 10 TABLET ORAL at 08:02

## 2022-02-09 RX ADMIN — Medication 1 G: at 09:02

## 2022-02-09 RX ADMIN — FAMOTIDINE 20 MG: 20 TABLET ORAL at 08:02

## 2022-02-09 RX ADMIN — FUROSEMIDE 80 MG: 80 TABLET ORAL at 04:02

## 2022-02-09 RX ADMIN — OXYCODONE HYDROCHLORIDE AND ACETAMINOPHEN 1 TABLET: 5; 325 TABLET ORAL at 03:02

## 2022-02-09 RX ADMIN — ATORVASTATIN CALCIUM 80 MG: 20 TABLET, FILM COATED ORAL at 09:02

## 2022-02-09 RX ADMIN — POLYETHYLENE GLYCOL 3350 17 G: 17 POWDER, FOR SOLUTION ORAL at 09:02

## 2022-02-09 RX ADMIN — LISINOPRIL 20 MG: 20 TABLET ORAL at 08:02

## 2022-02-09 RX ADMIN — OXYCODONE HYDROCHLORIDE AND ACETAMINOPHEN 1 TABLET: 5; 325 TABLET ORAL at 10:02

## 2022-02-09 RX ADMIN — Medication 400 MG: at 09:02

## 2022-02-09 RX ADMIN — BACLOFEN 10 MG: 10 TABLET ORAL at 09:02

## 2022-02-09 RX ADMIN — FUROSEMIDE 80 MG: 80 TABLET ORAL at 09:02

## 2022-02-09 RX ADMIN — LISINOPRIL 10 MG: 10 TABLET ORAL at 09:02

## 2022-02-09 RX ADMIN — WARFARIN SODIUM 4 MG: 2 TABLET ORAL at 04:02

## 2022-02-09 RX ADMIN — MAGNESIUM SULFATE IN WATER 2 G: 40 INJECTION, SOLUTION INTRAVENOUS at 05:02

## 2022-02-09 RX ADMIN — FAMOTIDINE 20 MG: 20 TABLET ORAL at 09:02

## 2022-02-09 RX ADMIN — SENNOSIDES AND DOCUSATE SODIUM 1 TABLET: 50; 8.6 TABLET ORAL at 09:02

## 2022-02-09 RX ADMIN — BACLOFEN 10 MG: 10 TABLET ORAL at 04:02

## 2022-02-09 NOTE — SUBJECTIVE & OBJECTIVE
Past Medical History:   Diagnosis Date    CLARISSE (acute kidney injury) 1/11/2021    CHF (congestive heart failure)     Chronic combined systolic and diastolic congestive heart failure 1/9/2021    Coronary artery disease     Diabetes mellitus     Hypertension     Kidney stones      Past Surgical History:   Procedure Laterality Date    CARDIAC CATHETERIZATION  2010    no stents    CARDIAC DEFIBRILLATOR PLACEMENT  2010    CARDIAC DEFIBRILLATOR PLACEMENT      HERNIA REPAIR      IRRIGATION OF MEDIASTINUM N/A 1/14/2021    Procedure: IRRIGATION, MEDIASTINUM;  Surgeon: Zenon Corona MD;  [facility];  Service: Cardiovascular;  Laterality: N/A;    KNEE ARTHROSCOPY Right     LEFT VENTRICULAR ASSIST DEVICE Left 1/13/2021    Procedure: INSERTION- HeartMate 3 LEFT VENTRICULAR ASSIST DEVICE ;  Surgeon: Zenon Corona MD;  [facility];  Service: Cardiovascular;  Laterality: Left;    RECONSTRUCTION OF PERICARDIUM N/A 1/14/2021    Procedure: RECONSTRUCTION, PERICARDIUM;  Surgeon: Zenon Corona MD;  [facility];  Service: Cardiovascular;  Laterality: N/A;    RIGHT HEART CATHETERIZATION Right 11/2/2020    Procedure: INSERTION, CATHETER, RIGHT HEART;  Surgeon: Deana Lake MD;  Location: [facility] CATH LAB;  Service: Cardiology;  Laterality: Right;    STERNAL WOUND CLOSURE  1/13/2021    Procedure: TEMPORARY CLOSURE OF CHEST;  Surgeon: Zenon Corona MD;  [facility];  Service: Cardiovascular;;    STERNAL WOUND CLOSURE N/A 1/14/2021    Procedure: CLOSURE, WOUND, STERNUM;  Surgeon: Zenon Corona MD;  [facility];  Service: Cardiovascular;  Laterality: N/A;     Review of patient's allergies indicates:   Allergen Reactions    Pcn [penicillins] Hives       Scheduled Medications:    atorvastatin  80 mg Oral Daily    baclofen  10 mg Oral TID    famotidine  20 mg Oral BID    furosemide  80 mg Oral BID    lisinopriL  10 mg Oral Daily    lisinopriL  20  mg Oral QHS    magnesium oxide  400 mg Oral BID    polyethylene glycol  17 g Oral Daily    senna-docusate 8.6-50 mg  1 tablet Oral Daily    sodium chloride  1 g Oral Daily    warfarin  4 mg Oral Daily       PRN Medications: sodium chloride, acetaminophen, dextrose 10%, glucagon (human recombinant), insulin aspart U-100, oxyCODONE-acetaminophen    Family History     Problem Relation (Age of Onset)    Heart attack Mother, Brother    Heart failure Brother    Hypertension Brother        Tobacco Use    Smoking status: Current Some Day Smoker     Types: Cigarettes    Smokeless tobacco: Never Used   Substance and Sexual Activity    Alcohol use: No    Drug use: Not on file    Sexual activity: Not on file     Review of Systems   Constitutional: Positive for activity change. Negative for fatigue and fever.   HENT: Negative for trouble swallowing and voice change.    Eyes: Negative for photophobia and visual disturbance.   Respiratory: Negative for cough and shortness of breath.    Cardiovascular: Negative for chest pain and palpitations.   Gastrointestinal: Negative for nausea and vomiting.   Genitourinary: Negative for difficulty urinating and flank pain.   Musculoskeletal: Positive for gait problem. Negative for arthralgias.   Skin: Negative for color change and rash.   Neurological: Positive for weakness and headaches. Negative for speech difficulty and numbness.   Psychiatric/Behavioral: Negative for agitation and confusion.     Objective:     Vital Signs (Most Recent):  Temp: 98.1 °F (36.7 °C) (02/09/22 0701)  Pulse: (!) 112 (02/09/22 1000)  Resp: (!) 9 (02/09/22 1030)  BP: (!) 82/0 (02/09/22 0701)  SpO2: 100 % (02/09/22 0701)    Vital Signs (24h Range):  Temp:  [98.1 °F (36.7 °C)-98.7 °F (37.1 °C)] 98.1 °F (36.7 °C)  Pulse:  [] 112  Resp:  [7-25] 9  SpO2:  [96 %-100 %] 100 %  BP: (70-87)/(0-66) 82/0  Arterial Line BP: ()/(56-82) 92/70     Body mass index is 23.97 kg/m².    Physical  Exam  Constitutional:       General: He is not in acute distress.     Appearance: He is well-developed and well-nourished.   HENT:      Head: Normocephalic and atraumatic.   Eyes:      General:         Right eye: No discharge.         Left eye: No discharge.   Cardiovascular:      Pulses: Intact distal pulses.      Comments: LVAD and a-line in place  Pulmonary:      Effort: Pulmonary effort is normal. No respiratory distress.   Abdominal:      General: There is no distension.      Palpations: Abdomen is soft.   Musculoskeletal:         General: No deformity or edema.      Cervical back: Neck supple.   Skin:     General: Skin is warm and dry.   Neurological:      Mental Status: He is alert and oriented to person, place, and time.      Comments: Follows commands   Generalized deconditioning  Latoya   Psychiatric:         Mood and Affect: Mood and affect normal.         Behavior: Behavior normal. Behavior is cooperative.         Cognition and Memory: Cognition is not impaired.       NEUROLOGICAL EXAMINATION:     MENTAL STATUS   Oriented to person, place, and time.       Diagnostic Results:   Labs: Reviewed  X-Ray: Reviewed  CT: Reviewed

## 2022-02-09 NOTE — ASSESSMENT & PLAN NOTE
-S/P DT HM3 1/13/21  -Current speed 5000  -INR goal 2.0-3.0. Current INR subtherapeutic. Coumadin restarted as above.  -ASA stopped.   -LDH stable    Procedure: Device Interrogation Including analysis of device parameters  Current Settings: Ventricular Assist Device  Review of device function is stable/unstable stable         TXP LVAD INTERROGATIONS 2/9/2022 2/9/2022 2/9/2022 2/9/2022 2/9/2022 2/9/2022 2/9/2022   Type HeartMate3 HeartMate3 HeartMate3 HeartMate3 HeartMate3 HeartMate3 HeartMate3   Flow 4.2 3.7 4.2 4 4.1 4.2 3.8   Speed 5100 5000 5000 5000 5000 5000 5000   PI 4.5 6.7 4.2 6 5 4.9 7.4   Power (Edwards) 3.3 3.3 3.3 3.4 3.3 3.5 3.1   LSL 4600 4600 4600 4600 4600 4600 4600   Pulsatility - Intermittent pulse Intermittent pulse Intermittent pulse Intermittent pulse Intermittent pulse Intermittent pulse

## 2022-02-09 NOTE — PROGRESS NOTES
"UPDATE    CoVid-19 precaution - Transplant SW update conducted with patient by phone. Pt voices consent for phone visit today.     By phone Pt presents as AAO x4, calm, cooperative, and asking and answering questions appropriately. Pt shares he is feeling somewhat improved. He notes that he awakens in the morning and falls asleep at night with a headache which he rates "2-3 out of 10" on the pain scale. Pt shares overall that he is feeling better and grateful for team support. SW allowed Pt space and time to process course of his illness, his fears having become so sick, and his relief at his improvement. Pt shares concern for his wife and family members' health as well - SW validated concern and offered emotional support. Normalization of reactions to hospitalization provided. Discussed dispo plan of rehab vs HH PT/OT - Pt in agreement with either based on team's recommendations. Pt expresses appreciation for call  And time today. Pt denies immediate needs or concerns and none identified at this time. SW providing ongoing psychosocial, counseling, & emotional support, education, resources, assistance, and discharge planning as indicated.  SW to continue to follow.    "

## 2022-02-09 NOTE — CONSULTS
[facility] - Card[facility] Int[facility]  Physical Medicine & Rehab  Consult Note    Patient Name: Rocco France  MRN: [id number]  Admission Date: 1/27/2022  Hospital Length of Stay: 13 days  Attending Physician: Pete Baker MD     Inpatient consult to Physical Medicine & Rehabilitation  Consult performed by: America Ortega NP  Consult requested by:  Pete Baker MD    Reason for Consult:  assess rehabilitation needs    Consults  Subjective:     Principal Problem: Bilateral subdural hematomas    HPI: Per chart review, Rocco France is a 66-year-old male with PMHx of NICMP s/p LVAD on 1/13/21, s/p ICD, CAD s/p stening, HTN, HLD, and T2DM.  Patient presented to OSH s/p syncopal episode with fall and head trauma. At OSH, he was found to be in resp distress and was sedated and intubated. Imaging revealed subdural hematoma/subarachnoid hemorrhage. [place] [facility] [place] for further evaluation and management.  Upon admission, NSGY consulted. Repeat imaging revealed CTA with new cisternal SAH, negative aneurysm or vascular malformation on CTA, likely nonaneurysmal perimesencephalic SAH per NSGY. Repeat CHT 2/7 stable. Repeat CTH pending when INR therapeutic. Extubated to  on 1/28. Hospital course complicated by COVID-19 infection (tested positive on 1/28/22 now out of isolation), subtherapeutic INR, and impaired functional mobility.     Functional History: Patient lives in [place] with spouse in a single story home with 3 steps to enter.  Prior to admission, (I) with ADLs and mobility.  DME: none.    Hospital Course: 2.7: PT-BM and sit to stand CGA. Ambulated 6 ft fwd/bwd Min.   02/08/2022: OT-Bed mobility SBA. Static sitting EOB SBS with mild dizziness. Sit to stand and transfers CGA.  Ambulated 8 FT CGA.  UBD/toilet CGA and F/G Mod (I).     Past Medical History:   Diagnosis Date    CLARISSE (acute kidney injury) 1/11/2021    CHF (congestive heart failure)     Chronic combined systolic and diastolic  congestive heart failure 1/9/2021    Coronary artery disease     Diabetes mellitus     Hypertension     Kidney stones      Past Surgical History:   Procedure Laterality Date    CARDIAC CATHETERIZATION  2010    no stents    CARDIAC DEFIBRILLATOR PLACEMENT  2010    CARDIAC DEFIBRILLATOR PLACEMENT      HERNIA REPAIR      IRRIGATION OF MEDIASTINUM N/A 1/14/2021    Procedure: IRRIGATION, MEDIASTINUM;  Surgeon: Zenon Corona MD;  Location: [facility] OR [facility];  Service: Cardiovascular;  Laterality: N/A;    KNEE ARTHROSCOPY Right     LEFT VENTRICULAR ASSIST DEVICE Left 1/13/2021    Procedure: INSERTION- HeartMate 3 LEFT VENTRICULAR ASSIST DEVICE ;  Surgeon: Zenon Corona MD;  Location: [facility] OR [facility] FLR;  Service: Cardiovascular;  Laterality: Left;    RECONSTRUCTION OF PERICARDIUM N/A 1/14/2021    Procedure: RECONSTRUCTION, PERICARDIUM;  Surgeon: Zenon Corona MD;  Location: [facility] OR [facility];  Service: Cardiovascular;  Laterality: N/A;    RIGHT HEART CATHETERIZATION Right 11/2/2020    Procedure: INSERTION, CATHETER, RIGHT HEART;  Surgeon: Deana Lake MD;  Location: [facility] CATH LAB;  Service: Cardiology;  Laterality: Right;    STERNAL WOUND CLOSURE  1/13/2021    Procedure: TEMPORARY CLOSURE OF CHEST;  Surgeon: Zenon Corona MD;  Location: [facility] OR [facility];  Service: Cardiovascular;;    STERNAL WOUND CLOSURE N/A 1/14/2021    Procedure: CLOSURE, WOUND, STERNUM;  Surgeon: Zenon Corona MD;  Location: [facility] OR [facility];  Service: Cardiovascular;  Laterality: N/A;     Review of patient's allergies indicates:   Allergen Reactions    Pcn [penicillins] Hives       Scheduled Medications:    atorvastatin  80 mg Oral Daily    baclofen  10 mg Oral TID    famotidine  20 mg Oral BID    furosemide  80 mg Oral BID    lisinopriL  10 mg Oral Daily    lisinopriL  20 mg Oral QHS    magnesium oxide  400 mg Oral BID    polyethylene glycol  17 g Oral Daily    senna-docusate 8.6-50 mg  1 tablet Oral Daily    sodium chloride   1 g Oral Daily    warfarin  4 mg Oral Daily       PRN Medications: sodium chloride, acetaminophen, dextrose 10%, glucagon (human recombinant), insulin aspart U-100, oxyCODONE-acetaminophen    Family History     Problem Relation (Age of Onset)    Heart attack Mother, Brother    Heart failure Brother    Hypertension Brother        Tobacco Use    Smoking status: Current Some Day Smoker     Types: Cigarettes    Smokeless tobacco: Never Used   Substance and Sexual Activity    Alcohol use: No    Drug use: Not on file    Sexual activity: Not on file     Review of Systems   Constitutional: Positive for activity change. Negative for fatigue and fever.   HENT: Negative for trouble swallowing and voice change.    Eyes: Negative for photophobia and visual disturbance.   Respiratory: Negative for cough and shortness of breath.    Cardiovascular: Negative for chest pain and palpitations.   Gastrointestinal: Negative for nausea and vomiting.   Genitourinary: Negative for difficulty urinating and flank pain.   Musculoskeletal: Positive for gait problem. Negative for arthralgias.   Skin: Negative for color change and rash.   Neurological: Positive for weakness and headaches. Negative for speech difficulty and numbness.   Psychiatric/Behavioral: Negative for agitation and confusion.     Objective:     Vital Signs (Most Recent):  Temp: 98.1 °F (36.7 °C) (02/09/22 0701)  Pulse: (!) 112 (02/09/22 1000)  Resp: (!) 9 (02/09/22 1030)  BP: (!) 82/0 (02/09/22 0701)  SpO2: 100 % (02/09/22 0701)    Vital Signs (24h Range):  Temp:  [98.1 °F (36.7 °C)-98.7 °F (37.1 °C)] 98.1 °F (36.7 °C)  Pulse:  [] 112  Resp:  [7-25] 9  SpO2:  [96 %-100 %] 100 %  BP: (70-87)/(0-66) 82/0  Arterial Line BP: ()/(56-82) 92/70     Body mass index is 23.97 kg/m².    Physical Exam  Constitutional:       General: He is not in acute distress.     Appearance: He is well-developed and well-nourished.   HENT:      Head: Normocephalic and atraumatic.    Eyes:      General:         Right eye: No discharge.         Left eye: No discharge.   Cardiovascular:      Pulses: Intact distal pulses.      Comments: LVAD and a-line in place  Pulmonary:      Effort: Pulmonary effort is normal. No respiratory distress.   Abdominal:      General: There is no distension.      Palpations: Abdomen is soft.   Musculoskeletal:         General: No deformity or edema.      Cervical back: Neck supple.   Skin:     General: Skin is warm and dry.   Neurological:      Mental Status: He is alert and oriented to person, place, and time.      Comments: Follows commands   Generalized deconditioning  Latoya   Psychiatric:         Mood and Affect: Mood and affect normal.         Behavior: Behavior normal. Behavior is cooperative.         Cognition and Memory: Cognition is not impaired.       Diagnostic Results:   Labs: Reviewed  X-Ray: Reviewed  CT: Reviewed    Assessment/Plan:     * Bilateral subdural hematomas  -Repeat imaging revealed CTA with new cisternal SAH, negative aneurysm or vascular malformation on CTA, likely nonaneurysmal perimesencephalic SAH per NSGY  - Repeat CHT 2/7 stable. Repeat CTH pending when INR therapeutic per NSGY    See hospital course for functional, cognitive/speech/language, and nutrition/swallow status.      Recommendations  -  Monitor sleep disturbances and establish consistent sleep-wake cycle  -  Environmental modifications to limit agitation/confusion   -  Reorient patient to person, place, time, and situation on each encounter  -  Avoid restraints  -  May benefit from 24/7 supervision  -  Avoid/limit medications that can worsen delirium (benzodiazepines, antihistamines, anticholinergics, hypnotics, opiates)  -  Encourage mobility, OOB in chair, and early ambulation as appropriate  -  PT/OT evaluate and treat  -  SLP speech and cognitive evaluate and treat  -  Monitor for bowel and bladder dysfunction  -  Monitor for and prevent skin breakdown and pressure  ulcers  · Early mobility, repositioning/weight shifting every 20-30 minutes when sitting, turn patient every 2 hours, proper mattress/overlay and chair cushioning, pressure relief/heel protector boots    Acute respiratory failure requiring reintubation  -extubated on 1/28, now on RA    LVAD (left ventricular assist device) present  -s/p LVAD 1/13/21  -INR subtherapeutic, primary team managing    Reviewed case with Collaborative MD. Participating with therapy and not medically stable for discharge. Will follow medical and therapy progress for post acute care recommendation.       Thank you for your consult.     America Ortega NP  Department of Physical Medicine & Rehab  [facility] - Card[facility] Int[facility]

## 2022-02-09 NOTE — PLAN OF CARE
Plan of care discussed with patient. Patient is free of fall/trauma/injury. Denies CP, SOB, or pain/discomfort. Vitals WNL, pt awake alert and oriented X 4. Stepdown from ICU today. All questions addressed. Will continue to monitor

## 2022-02-09 NOTE — CONSULTS
"Nutrition consult received stating "Nutritional assessment".  Please see note from 2/7 for full assessment, RD following.    Recommendations  1. Continue current Regular diet, add Diabetic diet restrictions if necessary (A1C 7.7); fluid restriction per MD.  2. Change ONS to Optisource if Vitamin K a concern.  3. RD to monitor & follow-up.     Goals: Meet % EEN, EPN by RD f/u date  Nutrition Goal Status: new  Communication of RD Recs: reviewed with RN    Thanks!  MS Agnes, RD, LDN  "

## 2022-02-09 NOTE — SUBJECTIVE & OBJECTIVE
Interval History: neuro checks moved to Q2, increased coumadin yesterday    Continuous Infusions:   sodium chloride 0.9% 5 mL/hr (02/08/22 0914)     Scheduled Meds:   atorvastatin  80 mg Oral Daily    baclofen  10 mg Oral TID    famotidine  20 mg Oral BID    furosemide  80 mg Oral BID    lisinopriL  10 mg Oral Daily    lisinopriL  20 mg Oral QHS    magnesium oxide  400 mg Oral BID    polyethylene glycol  17 g Oral Daily    senna-docusate 8.6-50 mg  1 tablet Oral Daily    sodium chloride  1 g Oral Daily    warfarin  4 mg Oral Daily     PRN Meds:sodium chloride, acetaminophen, dextrose 10%, glucagon (human recombinant), insulin aspart U-100, oxyCODONE-acetaminophen    Review of patient's allergies indicates:   Allergen Reactions    Pcn [penicillins] Hives     Objective:     Vital Signs (Most Recent):  Temp: 98.1 °F (36.7 °C) (02/09/22 0701)  Pulse: (!) 111 (02/09/22 0701)  Resp: (!) 7 (02/09/22 0701)  BP: (!) 82/0 (02/09/22 0701)  SpO2: 100 % (02/09/22 0701) Vital Signs (24h Range):  Temp:  [98.1 °F (36.7 °C)-98.7 °F (37.1 °C)] 98.1 °F (36.7 °C)  Pulse:  [] 111  Resp:  [7-25] 7  SpO2:  [96 %-100 %] 100 %  BP: (70-87)/(0-66) 82/0  Arterial Line BP: ()/(56-81) 80/68     Patient Vitals for the past 72 hrs (Last 3 readings):   Weight   02/09/22 0300 84.7 kg (186 lb 11.7 oz)   02/07/22 1300 82.3 kg (181 lb 7 oz)   02/07/22 0300 82.3 kg (181 lb 7 oz)     Body mass index is 23.97 kg/m².      Intake/Output Summary (Last 24 hours) at 2/9/2022 0727  Last data filed at 2/9/2022 0701  Gross per 24 hour   Intake 517.69 ml   Output 1850 ml   Net -1332.31 ml       Hemodynamic Parameters:           Physical Exam  Constitutional:       Appearance: Normal appearance.   HENT:      Head: Normocephalic.      Nose: Nose normal.   Neck:      Comments: JVP about midneck at 45 degrees, estimated CVP of 11-12  Cardiovascular:      Rate and Rhythm: Normal rate and regular rhythm.      Pulses: Normal pulses.      Heart  sounds: Normal heart sounds.      Comments: VAD hum present  Pulmonary:      Effort: Pulmonary effort is normal.      Breath sounds: Normal breath sounds.   Abdominal:      General: Abdomen is flat. Bowel sounds are normal.      Palpations: Abdomen is soft.   Musculoskeletal:         General: Normal range of motion.      Cervical back: Normal range of motion.   Skin:     General: Skin is warm.      Capillary Refill: Capillary refill takes less than 2 seconds.   Neurological:      General: No focal deficit present.      Mental Status: He is alert and oriented to person, place, and time.         Significant Labs:  CBC:  Recent Labs   Lab 02/07/22  0232 02/08/22  0444 02/09/22  0302   WBC 7.51 7.70 8.21   RBC 3.11* 3.09* 3.18*   HGB 8.9* 9.0* 9.1*   HCT 28.6* 28.5* 29.3*    153 181   MCV 92 92 92   MCH 28.6 29.1 28.6   MCHC 31.1* 31.6* 31.1*     BNP:  Recent Labs   Lab 02/04/22  0306 02/07/22  0232 02/09/22  0302   * 322* 258*     CMP:  Recent Labs   Lab 02/04/22  0417 02/04/22  0920 02/07/22  0232 02/08/22  0444 02/09/22  0302 02/09/22  0303   *   < > 117* 137*  --  146*   CALCIUM 8.2*   < > 8.4* 8.5*  --  8.5*   ALBUMIN 2.2*  --  2.3*  --  2.5*  --    PROT 6.8  --  6.6  --  7.1  --       < > 137 131*  --  135*   K 3.7   < > 4.0 3.7  --  4.0   CO2 22*   < > 24 22*  --  26   *   < > 107 102  --  101   BUN 13   < > 17 15  --  17   CREATININE 0.7   < > 0.7 0.7  --  0.8   ALKPHOS 95  --  119  --  125  --    ALT 8*  --  7*  --  <5*  --    AST 17  --  13  --  12  --    BILITOT 1.3*  --  1.1*  --  1.3*  --     < > = values in this interval not displayed.      Coagulation:   Recent Labs   Lab 02/07/22  0511 02/08/22  0444 02/09/22  0302   INR 1.2 1.2 1.2   APTT 26.9 29.1 28.5     LDH:  Recent Labs   Lab 02/07/22  0232 02/08/22  0444 02/09/22  0302    240 193     Microbiology:  Microbiology Results (last 7 days)     ** No results found for the last 168 hours. **          I have reviewed  all pertinent labs within the past 24 hours.    Estimated Creatinine Clearance: 105.6 mL/min (based on SCr of 0.8 mg/dL).    Diagnostic Results:  I have reviewed and interpreted all pertinent imaging results/findings within the past 24 hours.

## 2022-02-09 NOTE — PROGRESS NOTES
[facility] - Cardiac Intensive Care  Heart Transplant  Progress Note    Patient Name: Rocco France  MRN: [id number]  Admission Date: 1/27/2022  Hospital Length of Stay: 13 days  Attending Physician: Pete Baker MD  Primary Care Provider: Teresa Mendoza NP  Principal Problem:Bilateral subdural hematomas    Subjective:     Interval History: neuro checks moved to Q2, increased coumadin yesterday    Continuous Infusions:   sodium chloride 0.9% 5 mL/hr (02/08/22 0914)     Scheduled Meds:   atorvastatin  80 mg Oral Daily    baclofen  10 mg Oral TID    famotidine  20 mg Oral BID    furosemide  80 mg Oral BID    lisinopriL  10 mg Oral Daily    lisinopriL  20 mg Oral QHS    magnesium oxide  400 mg Oral BID    polyethylene glycol  17 g Oral Daily    senna-docusate 8.6-50 mg  1 tablet Oral Daily    sodium chloride  1 g Oral Daily    warfarin  4 mg Oral Daily     PRN Meds:sodium chloride, acetaminophen, dextrose 10%, glucagon (human recombinant), insulin aspart U-100, oxyCODONE-acetaminophen    Review of patient's allergies indicates:   Allergen Reactions    Pcn [penicillins] Hives     Objective:     Vital Signs (Most Recent):  Temp: 98.1 °F (36.7 °C) (02/09/22 0701)  Pulse: (!) 111 (02/09/22 0701)  Resp: (!) 7 (02/09/22 0701)  BP: (!) 82/0 (02/09/22 0701)  SpO2: 100 % (02/09/22 0701) Vital Signs (24h Range):  Temp:  [98.1 °F (36.7 °C)-98.7 °F (37.1 °C)] 98.1 °F (36.7 °C)  Pulse:  [] 111  Resp:  [7-25] 7  SpO2:  [96 %-100 %] 100 %  BP: (70-87)/(0-66) 82/0  Arterial Line BP: ()/(56-81) 80/68     Patient Vitals for the past 72 hrs (Last 3 readings):   Weight   02/09/22 0300 84.7 kg (186 lb 11.7 oz)   02/07/22 1300 82.3 kg (181 lb 7 oz)   02/07/22 0300 82.3 kg (181 lb 7 oz)     Body mass index is 23.97 kg/m².      Intake/Output Summary (Last 24 hours) at 2/9/2022 0727  Last data filed at 2/9/2022 0701  Gross per 24 hour   Intake 517.69 ml   Output 1850 ml   Net -1332.31 ml       Hemodynamic  Parameters:           Physical Exam  Constitutional:       Appearance: Normal appearance.   HENT:      Head: Normocephalic.      Nose: Nose normal.   Neck:      Comments: JVP about midneck at 45 degrees, estimated CVP of 11-12  Cardiovascular:      Rate and Rhythm: Normal rate and regular rhythm.      Pulses: Normal pulses.      Heart sounds: Normal heart sounds.      Comments: VAD hum present  Pulmonary:      Effort: Pulmonary effort is normal.      Breath sounds: Normal breath sounds.   Abdominal:      General: Abdomen is flat. Bowel sounds are normal.      Palpations: Abdomen is soft.   Musculoskeletal:         General: Normal range of motion.      Cervical back: Normal range of motion.   Skin:     General: Skin is warm.      Capillary Refill: Capillary refill takes less than 2 seconds.   Neurological:      General: No focal deficit present.      Mental Status: He is alert and oriented to person, place, and time.         Significant Labs:  CBC:  Recent Labs   Lab 02/07/22  0232 02/08/22  0444 02/09/22  0302   WBC 7.51 7.70 8.21   RBC 3.11* 3.09* 3.18*   HGB 8.9* 9.0* 9.1*   HCT 28.6* 28.5* 29.3*    153 181   MCV 92 92 92   MCH 28.6 29.1 28.6   MCHC 31.1* 31.6* 31.1*     BNP:  Recent Labs   Lab 02/04/22  0306 02/07/22  0232 02/09/22  0302   * 322* 258*     CMP:  Recent Labs   Lab 02/04/22  0417 02/04/22  0920 02/07/22  0232 02/08/22  0444 02/09/22  0302 02/09/22  0303   *   < > 117* 137*  --  146*   CALCIUM 8.2*   < > 8.4* 8.5*  --  8.5*   ALBUMIN 2.2*  --  2.3*  --  2.5*  --    PROT 6.8  --  6.6  --  7.1  --       < > 137 131*  --  135*   K 3.7   < > 4.0 3.7  --  4.0   CO2 22*   < > 24 22*  --  26   *   < > 107 102  --  101   BUN 13   < > 17 15  --  17   CREATININE 0.7   < > 0.7 0.7  --  0.8   ALKPHOS 95  --  119  --  125  --    ALT 8*  --  7*  --  <5*  --    AST 17  --  13  --  12  --    BILITOT 1.3*  --  1.1*  --  1.3*  --     < > = values in this interval not displayed.       Coagulation:   Recent Labs   Lab 02/07/22  0511 02/08/22  0444 02/09/22  0302   INR 1.2 1.2 1.2   APTT 26.9 29.1 28.5     LDH:  Recent Labs   Lab 02/07/22  0232 02/08/22  0444 02/09/22  0302    240 193     Microbiology:  Microbiology Results (last 7 days)     ** No results found for the last 168 hours. **          I have reviewed all pertinent labs within the past 24 hours.    Estimated Creatinine Clearance: 105.6 mL/min (based on SCr of 0.8 mg/dL).    Diagnostic Results:  I have reviewed and interpreted all pertinent imaging results/findings within the past 24 hours.    Assessment and Plan:     67 yo BM with stage D CHF due to NICMP s/p HM3, s/p ICD, HTN, HLD, T2DM was transferred from outside hospital emergency room after he was found to have subdural hematoma/subarachnoid hemorrhage.  Patient presented to the ER after he was found to have a syncopal event.  Also patient had respiratory distress on arrival for which he was intubated.  Patient was transferred here for further evaluation.  On arrival patient is intubated and is on propofol.  Neurosurgery and Neuro Critical Care were immediately informed.  Neurosurgery recommended to repeat CT after reviewing the prior CT done in the ER in [place].  INR on arrival is 1.6.  He also had a low flow with a increased PI around 4:00 p.m. this evening.  According to wife patient was complaining of headache for the last 2 weeks..  [place] went to post office this [place] and she does not know what exactly happened..  ICD was interrogated and did not show any VT/VF.       * Bilateral subdural hematomas  -Patient had a traumatic subdural, subarachnoid hemorrhage after fall on 1/27. Currently no focal deficits  -NCC signed off. NSGY following.   -INR subtherapeutic. Coumadin restarted 2/5 at low dose with plans to watch INR rise slowly. PharmD to adjust dosing  -Goal Na+ > 135, slowly trickled down days after stopping hypertonic saline. Salt tabs given prn. Will monitor  for improvement with increased diuresis.  -Keppra started as AED. EEG negative for seizures.  -Transcranial dopplers done 1/31 without evidence of vasospasm.  -Continue q1h neurochecks.    Acute respiratory failure requiring reintubation  - Extubated 1/28 and on RA  - Tx'd with remdesivir which was since discontinued. Asymptomatic from covid perspective    LVAD (left ventricular assist device) present  -S/P DT HM3 1/13/21  -Current speed 5000  -INR goal 2.0-3.0. Current INR subtherapeutic. Coumadin restarted as above.  -ASA stopped.   -LDH stable    Procedure: Device Interrogation Including analysis of device parameters  Current Settings: Ventricular Assist Device  Review of device function is stable/unstable stable         TXP LVAD INTERROGATIONS 2/9/2022 2/9/2022 2/9/2022 2/9/2022 2/9/2022 2/9/2022 2/9/2022   Type HeartMate3 HeartMate3 HeartMate3 HeartMate3 HeartMate3 HeartMate3 HeartMate3   Flow 4.2 3.7 4.2 4 4.1 4.2 3.8   Speed 5100 5000 5000 5000 5000 5000 5000   PI 4.5 6.7 4.2 6 5 4.9 7.4   Power (Edwards) 3.3 3.3 3.3 3.4 3.3 3.5 3.1   LSL 4600 4600 4600 4600 4600 4600 4600   Pulsatility - Intermittent pulse Intermittent pulse Intermittent pulse Intermittent pulse Intermittent pulse Intermittent pulse       Type 2 diabetes mellitus without complication  -SSI  ACHS     Essential hypertension  -Levophed off. MAP goal >65  -Continue Lisinopril 10qam/20qhs,  doxazosin 2mg BID initiated but discontinued yesterday as BP controlled with adjust lisinopril dosing. Will monitor closely and reinitiate if elevated Bps return    Uninterrupted Critical Care/Counseling Time (not including procedures): 30 minutes      CT Fam  Heart Transplant  [facility] - Cardiac Intensive Care

## 2022-02-09 NOTE — PLAN OF CARE
Cardiac ICU Care Plan    NAEON. C/o slight headache which resolved with PRN PO pain management. MAP 70-80s mm Hg this shift. Neuro checks now every 2 hours. Neuro intact. VAD with no alarms present.     POC reviewed with Rocco France and family. Questions and concerns addressed. No acute events today. Pt progressing toward goals. Will continue to monitor. See below and flowsheets for full assessment and VS info.       Neuro:  Nain Coma Scale  Best Eye Response: 4-->(E4) spontaneous  Best Motor Response: 6-->(M6) obeys commands  Best Verbal Response: 5-->(V5) oriented  Nain Coma Scale Score: 15  Assessment Qualifiers: patient not sedated/intubated,no eye obstruction present  Pupil PERRLA: yes    24 hr Temp:  [98.1 °F (36.7 °C)-98.7 °F (37.1 °C)]      CV:  Rhythm: sinus tachycardia   DVT prophylaxis: VTE Required Core Measure: Pharmacological prophylaxis initiated/maintained                    Type: HeartMate3       Pulses  Right Radial Pulse: 1+ (weak)  Left Radial Pulse: 1+ (weak)  Right Dorsalis Pedis Pulse: 1+ (weak)  Left Dorsalis Pedis Pulse: 1+ (weak)  Right Posterior Tibial Pulse: Doppler  Left Posterior Tibial Pulse: Doppler    Resp:  O2 Device (Oxygen Therapy): room air  Flow (L/min): 2  Vent Mode: Spont  Set Rate: 18 BPM  Oxygen Concentration (%): 3  Vt Set: 450 mL  PEEP/CPAP: 5 cmH20  Pressure Support: 10 cmH20    GI/:  GI prophylaxis: yes  Diet/Nutrition Received: low saturated fat/low cholesterol,2 gram sodium,restrict fluids  Last Bowel Movement: 02/03/22  Voiding Characteristics: voids spontaneously without difficulty   Intake/Output Summary (Last 24 hours) at 2/9/2022 0451  Last data filed at 2/9/2022 0300  Gross per 24 hour   Intake 463.1 ml   Output 1525 ml   Net -1061.9 ml          Labs/Accuchecks:  Recent Labs   Lab 02/07/22  0232 02/08/22  0444 02/09/22  0302   WBC 7.51 7.70 8.21   RBC 3.11* 3.09* 3.18*   HGB 8.9* 9.0* 9.1*   HCT 28.6* 28.5* 29.3*    153 181      Recent Labs    Lab 02/07/22  0511 02/08/22  0444 02/09/22  0302   INR 1.2 1.2 1.2   APTT 26.9 29.1 28.5      Recent Labs     02/08/22  0444 02/09/22  0302 02/09/22  0303   NA   < >  --  135*   K   < >  --  4.0   CO2   < >  --  26   CL   < >  --  101   BUN   < >  --  17   CREATININE   < >  --  0.8   ALKPHOS  --  125  --    ALT  --  <5*  --    AST  --  12  --    BILITOT  --  1.3*  --     < > = values in this interval not displayed.     No results for input(s): CPK, CPKMB, MB, TROPONINI in the last 168 hours. No results for input(s): PH, PCO2, PO2, HCO3, POCSATURATED, BE in the last 72 hours.    Electrolytes: Electrolytes replaced  Accuchecks: Q6H    Gtts/LDAs:   sodium chloride 0.9% 5 mL/hr (02/08/22 0914)       Lines/Drains/Airways       Arterial Line              Arterial Line 01/27/22 1947 Left Radial 12 days              Line                   VAD 01/13/21 1211 Left ventricular assist device HeartMate 3 391 days              Peripheral Intravenous Line                   Peripheral IV - Single Lumen 02/07/22 0245 20 G Anterior;Left;Proximal Forearm 2 days         Peripheral IV - Single Lumen 02/08/22 1150 20 G Anterior;Right Forearm <1 day                    Skin/Wounds  Bathing/Skin Care: bath, complete;linen changed (02/08/22 1700)  Wounds: No  Wound care consulted: No    Problem: Adult Inpatient Plan of Care  Goal: Plan of Care Review  Outcome: Ongoing, Progressing  Flowsheets (Taken 2/9/2022 2209)  Plan of Care Reviewed With: patient

## 2022-02-09 NOTE — ASSESSMENT & PLAN NOTE
-Repeat imaging revealed CTA with new cisternal SAH, negative aneurysm or vascular malformation on CTA, likely nonaneurysmal perimesencephalic SAH per NSGY  - Repeat CHT 2/7 stable. Repeat CTH pending when INR therapeutic per NSGY    See hospital course for functional, cognitive/speech/language, and nutrition/swallow status.      Recommendations  -  Monitor sleep disturbances and establish consistent sleep-wake cycle  -  Environmental modifications to limit agitation/confusion   -  Reorient patient to person, place, time, and situation on each encounter  -  Avoid restraints  -  May benefit from 24/7 supervision  -  Avoid/limit medications that can worsen delirium (benzodiazepines, antihistamines, anticholinergics, hypnotics, opiates)  -  Encourage mobility, OOB in chair, and early ambulation as appropriate  -  PT/OT evaluate and treat  -  SLP speech and cognitive evaluate and treat  -  Monitor for bowel and bladder dysfunction  -  Monitor for and prevent skin breakdown and pressure ulcers  · Early mobility, repositioning/weight shifting every 20-30 minutes when sitting, turn patient every 2 hours, proper mattress/overlay and chair cushioning, pressure relief/heel protector boots

## 2022-02-09 NOTE — HPI
Per chart review, Rocco France is a 66-year-old male with PMHx of NICMP s/p LVAD on 1/13/21, s/p ICD, CAD s/p stening, HTN, HLD, and T2DM.  Patient presented to OSH s/p syncopal episode with fall and head trauma. At OSH, he was found to be in resp distress and was sedated and intubated. Imaging revealed subdural hematoma/subarachnoid hemorrhage. Transferred to [facility] on 1/27 for further evaluation and management.  Upon admission, NSGY consulted. Repeat imaging revealed CTA with new cisternal SAH, negative aneurysm or vascular malformation on CTA, likely nonaneurysmal perimesencephalic SAH per NSGY. Repeat CHT 2/7 stable. Repeat CTH pending when INR therapeutic. Extubated to  on 1/28. Hospital course complicated by COVID-19 infection (tested positive on 1/28/22 now out of isolation), subtherapeutic INR, and impaired functional mobility.     Functional History: Patient lives in [place] with spouse in a single story home with 3 steps to enter.  Prior to admission, (I) with ADLs and mobility.  DME: none.

## 2022-02-09 NOTE — HOSPITAL COURSE
2.7: PT-BM and sit to stand CGA. Ambulated 6 ft fwd/bwd Min.   02/08/2022: OT-Bed mobility SBA. Static sitting EOB SBS with mild dizziness. Sit to stand and transfers CGA.  Ambulated 8 FT CGA.  UBD/toilet CGA and F/G Mod (I).   2/9: No therapy.   2/10: BM SBA. Sit to stand CGA. Ambulated 22 ft Min.   2/13: BM SBA. Sit to stand and trsx CGA. Ambulated 25 ft CGA.

## 2022-02-09 NOTE — ASSESSMENT & PLAN NOTE
66M PMHx cardiomyopathy, s/p LVAD on coumadin and CAD s/p stenting on ASA, presenting after syncopal event and fall with hitting head, found to have bilateral SDH R>L on CT at OSH, rpt CT with new cisternal SAH, negative aneurysm or vascular malformation on CTA, likely nonaneurysmal perimesencephalic SAH    --Admitted to CICU; CICU team primary   -continued q1h neurochecks in ICU  --All labs and diagnostics reviewed   -CTH 1/27 OSH: bilat aucte on chronic SDH, R > L, no shift, no hydro, cisterns open.    -CTH 1/27 rpt: new SAH in interpeduncular and quadrigeminal cisterns   -CTA 1/27: negative for underlying vascular lesion   -CTH 1/29: stable       -CTH 2/1: SAH essentially resolved, convexity subdurals improved. vents stable   -CTA 2/4: no vascular anomaly or vasospasm present, stable SDH & resolved SAH  --Completed post-acute SAH treatment; continued primary medical management per CICU  --SBP <160  --Na > 135  --OK to continue coumadin at this time; will get repeat CTH 2/7  --TEDs/SCDs  --OK for diet per primary team  --All further work-up/management per CICU  --Please call NSGY with any acute exam changes    Dispo: per primary team      10-Feb-2022

## 2022-02-09 NOTE — PROGRESS NOTES
02/09/2022  Micahsade Velez Jr    Current provider:  Pete Baker MD    Device interrogation:  TXP LVAD INTERROGATIONS 2/9/2022 2/9/2022 2/9/2022 2/9/2022 2/9/2022 2/9/2022 2/9/2022   Type HeartMate3 HeartMate3 HeartMate3 HeartMate3 HeartMate3 HeartMate3 HeartMate3   Flow 4.0 4.2 4.2 3.9 3.4 3.9 4.2   Speed 5000 5000 5000 5000 5000 5000 5100   PI 5.5 4.9 5 5.9 3.4 4.5 4.5   Power (Edwards) 3.3 3.6 3.4 3.4 3.3 3.4 3.3   LSL 4600 4600 4600 4600 4600 4600 4600   Pulsatility Intermittent pulse Intermittent pulse Intermittent pulse Intermittent pulse Intermittent pulse Intermittent pulse Intermittent pulse          Rounded on Rocco France to ensure all mechanical assist device settings (IABP or VAD) were appropriate and all parameters were within limits.  I was able to ensure all back up equipment was present, the staff had no issues, and the Perfusion Department daily rounding was complete.      For implantable VADs: Interrogation of Ventricular assist device was performed with analysis of device parameters and review of device function. I have personally reviewed the interrogation findings and agree with findings as stated.     In emergency, the nursing units have been notified to contact the perfusion department either by:  Calling [phone] from 630am to 4pm Mon thru Fri, utilizing the On-Call Finder functionality of Epic and searching for Perfusion, or by contacting the hospital  from 4pm to 630am and on weekends and asking to speak with the perfusionist on call.    12:41 PM

## 2022-02-10 LAB
ANION GAP SERPL CALC-SCNC: 5 MMOL/L (ref 8–16)
ANION GAP SERPL CALC-SCNC: 5 MMOL/L (ref 8–16)
APTT BLDCRRT: 29.7 SEC (ref 21–32)
APTT BLDCRRT: 29.7 SEC (ref 21–32)
BASOPHILS # BLD AUTO: 0.02 K/UL (ref 0–0.2)
BASOPHILS # BLD AUTO: 0.02 K/UL (ref 0–0.2)
BASOPHILS NFR BLD: 0.2 % (ref 0–1.9)
BASOPHILS NFR BLD: 0.2 % (ref 0–1.9)
BUN SERPL-MCNC: 18 MG/DL (ref 8–23)
BUN SERPL-MCNC: 18 MG/DL (ref 8–23)
CALCIUM SERPL-MCNC: 8.6 MG/DL (ref 8.7–10.5)
CALCIUM SERPL-MCNC: 8.6 MG/DL (ref 8.7–10.5)
CHLORIDE SERPL-SCNC: 99 MMOL/L (ref 95–110)
CHLORIDE SERPL-SCNC: 99 MMOL/L (ref 95–110)
CO2 SERPL-SCNC: 27 MMOL/L (ref 23–29)
CO2 SERPL-SCNC: 27 MMOL/L (ref 23–29)
CREAT SERPL-MCNC: 0.8 MG/DL (ref 0.5–1.4)
CREAT SERPL-MCNC: 0.8 MG/DL (ref 0.5–1.4)
DIFFERENTIAL METHOD: ABNORMAL
DIFFERENTIAL METHOD: ABNORMAL
EOSINOPHIL # BLD AUTO: 0.1 K/UL (ref 0–0.5)
EOSINOPHIL # BLD AUTO: 0.1 K/UL (ref 0–0.5)
EOSINOPHIL NFR BLD: 1.2 % (ref 0–8)
EOSINOPHIL NFR BLD: 1.2 % (ref 0–8)
ERYTHROCYTE [DISTWIDTH] IN BLOOD BY AUTOMATED COUNT: 14.9 % (ref 11.5–14.5)
ERYTHROCYTE [DISTWIDTH] IN BLOOD BY AUTOMATED COUNT: 14.9 % (ref 11.5–14.5)
EST. GFR  (AFRICAN AMERICAN): >60 ML/MIN/1.73 M^2
EST. GFR  (AFRICAN AMERICAN): >60 ML/MIN/1.73 M^2
EST. GFR  (NON AFRICAN AMERICAN): >60 ML/MIN/1.73 M^2
EST. GFR  (NON AFRICAN AMERICAN): >60 ML/MIN/1.73 M^2
GLUCOSE SERPL-MCNC: 143 MG/DL (ref 70–110)
GLUCOSE SERPL-MCNC: 143 MG/DL (ref 70–110)
HCT VFR BLD AUTO: 28.5 % (ref 40–54)
HCT VFR BLD AUTO: 28.5 % (ref 40–54)
HGB BLD-MCNC: 9 G/DL (ref 14–18)
HGB BLD-MCNC: 9 G/DL (ref 14–18)
IMM GRANULOCYTES # BLD AUTO: 0.04 K/UL (ref 0–0.04)
IMM GRANULOCYTES # BLD AUTO: 0.04 K/UL (ref 0–0.04)
IMM GRANULOCYTES NFR BLD AUTO: 0.5 % (ref 0–0.5)
IMM GRANULOCYTES NFR BLD AUTO: 0.5 % (ref 0–0.5)
INR PPP: 1.2 (ref 0.8–1.2)
INR PPP: 1.2 (ref 0.8–1.2)
LDH SERPL L TO P-CCNC: 171 U/L (ref 110–260)
LYMPHOCYTES # BLD AUTO: 1.5 K/UL (ref 1–4.8)
LYMPHOCYTES # BLD AUTO: 1.5 K/UL (ref 1–4.8)
LYMPHOCYTES NFR BLD: 18.4 % (ref 18–48)
LYMPHOCYTES NFR BLD: 18.4 % (ref 18–48)
MAGNESIUM SERPL-MCNC: 2 MG/DL (ref 1.6–2.6)
MAGNESIUM SERPL-MCNC: 2 MG/DL (ref 1.6–2.6)
MCH RBC QN AUTO: 28 PG (ref 27–31)
MCH RBC QN AUTO: 28 PG (ref 27–31)
MCHC RBC AUTO-ENTMCNC: 31.6 G/DL (ref 32–36)
MCHC RBC AUTO-ENTMCNC: 31.6 G/DL (ref 32–36)
MCV RBC AUTO: 89 FL (ref 82–98)
MCV RBC AUTO: 89 FL (ref 82–98)
MONOCYTES # BLD AUTO: 0.9 K/UL (ref 0.3–1)
MONOCYTES # BLD AUTO: 0.9 K/UL (ref 0.3–1)
MONOCYTES NFR BLD: 10.5 % (ref 4–15)
MONOCYTES NFR BLD: 10.5 % (ref 4–15)
NEUTROPHILS # BLD AUTO: 5.7 K/UL (ref 1.8–7.7)
NEUTROPHILS # BLD AUTO: 5.7 K/UL (ref 1.8–7.7)
NEUTROPHILS NFR BLD: 69.2 % (ref 38–73)
NEUTROPHILS NFR BLD: 69.2 % (ref 38–73)
NRBC BLD-RTO: 0 /100 WBC
NRBC BLD-RTO: 0 /100 WBC
PHOSPHATE SERPL-MCNC: 2.7 MG/DL (ref 2.7–4.5)
PHOSPHATE SERPL-MCNC: 2.7 MG/DL (ref 2.7–4.5)
PLATELET # BLD AUTO: 189 K/UL (ref 150–450)
PLATELET # BLD AUTO: 189 K/UL (ref 150–450)
PMV BLD AUTO: 11.1 FL (ref 9.2–12.9)
PMV BLD AUTO: 11.1 FL (ref 9.2–12.9)
POCT GLUCOSE: 145 MG/DL (ref 70–110)
POCT GLUCOSE: 165 MG/DL (ref 70–110)
POCT GLUCOSE: 220 MG/DL (ref 70–110)
POTASSIUM SERPL-SCNC: 3.9 MMOL/L (ref 3.5–5.1)
POTASSIUM SERPL-SCNC: 3.9 MMOL/L (ref 3.5–5.1)
PROTHROMBIN TIME: 12.4 SEC (ref 9–12.5)
PROTHROMBIN TIME: 12.4 SEC (ref 9–12.5)
RBC # BLD AUTO: 3.21 M/UL (ref 4.6–6.2)
RBC # BLD AUTO: 3.21 M/UL (ref 4.6–6.2)
SODIUM SERPL-SCNC: 131 MMOL/L (ref 136–145)
SODIUM SERPL-SCNC: 131 MMOL/L (ref 136–145)
WBC # BLD AUTO: 8.25 K/UL (ref 3.9–12.7)
WBC # BLD AUTO: 8.25 K/UL (ref 3.9–12.7)

## 2022-02-10 PROCEDURE — 25000003 PHARM REV CODE 250: Performed by: INTERNAL MEDICINE

## 2022-02-10 PROCEDURE — 63600175 PHARM REV CODE 636 W HCPCS: Performed by: HOSPITALIST

## 2022-02-10 PROCEDURE — 84100 ASSAY OF PHOSPHORUS: CPT | Performed by: HOSPITALIST

## 2022-02-10 PROCEDURE — 97116 GAIT TRAINING THERAPY: CPT

## 2022-02-10 PROCEDURE — 25000003 PHARM REV CODE 250: Performed by: STUDENT IN AN ORGANIZED HEALTH CARE EDUCATION/TRAINING PROGRAM

## 2022-02-10 PROCEDURE — 25000003 PHARM REV CODE 250: Performed by: HOSPITALIST

## 2022-02-10 PROCEDURE — 85025 COMPLETE CBC W/AUTO DIFF WBC: CPT | Performed by: HOSPITALIST

## 2022-02-10 PROCEDURE — 27000248 HC VAD-ADDITIONAL DAY

## 2022-02-10 PROCEDURE — 83735 ASSAY OF MAGNESIUM: CPT | Performed by: HOSPITALIST

## 2022-02-10 PROCEDURE — 97535 SELF CARE MNGMENT TRAINING: CPT

## 2022-02-10 PROCEDURE — 99232 SBSQ HOSP IP/OBS MODERATE 35: CPT | Mod: ,,, | Performed by: NURSE PRACTITIONER

## 2022-02-10 PROCEDURE — 99233 SBSQ HOSP IP/OBS HIGH 50: CPT | Mod: 95,,, | Performed by: PHYSICIAN ASSISTANT

## 2022-02-10 PROCEDURE — 85730 THROMBOPLASTIN TIME PARTIAL: CPT | Performed by: HOSPITALIST

## 2022-02-10 PROCEDURE — 36415 COLL VENOUS BLD VENIPUNCTURE: CPT | Performed by: HOSPITALIST

## 2022-02-10 PROCEDURE — 80048 BASIC METABOLIC PNL TOTAL CA: CPT | Performed by: HOSPITALIST

## 2022-02-10 PROCEDURE — 85610 PROTHROMBIN TIME: CPT | Performed by: HOSPITALIST

## 2022-02-10 PROCEDURE — 20600001 HC STEP DOWN PRIVATE ROOM

## 2022-02-10 PROCEDURE — 94761 N-INVAS EAR/PLS OXIMETRY MLT: CPT

## 2022-02-10 PROCEDURE — 97530 THERAPEUTIC ACTIVITIES: CPT

## 2022-02-10 PROCEDURE — 25000003 PHARM REV CODE 250: Performed by: PHYSICIAN ASSISTANT

## 2022-02-10 PROCEDURE — 99233 PR SUBSEQUENT HOSPITAL CARE,LEVL III: ICD-10-PCS | Mod: 95,,, | Performed by: PHYSICIAN ASSISTANT

## 2022-02-10 PROCEDURE — 83615 LACTATE (LD) (LDH) ENZYME: CPT | Performed by: HOSPITALIST

## 2022-02-10 PROCEDURE — 99232 PR SUBSEQUENT HOSPITAL CARE,LEVL II: ICD-10-PCS | Mod: ,,, | Performed by: NURSE PRACTITIONER

## 2022-02-10 RX ORDER — AMOXICILLIN 250 MG
2 CAPSULE ORAL ONCE
Status: COMPLETED | OUTPATIENT
Start: 2022-02-10 | End: 2022-02-10

## 2022-02-10 RX ORDER — WARFARIN SODIUM 5 MG/1
5 TABLET ORAL DAILY
Status: DISCONTINUED | OUTPATIENT
Start: 2022-02-11 | End: 2022-02-12

## 2022-02-10 RX ORDER — WARFARIN 7.5 MG/1
7.5 TABLET ORAL ONCE
Status: COMPLETED | OUTPATIENT
Start: 2022-02-10 | End: 2022-02-10

## 2022-02-10 RX ADMIN — ATORVASTATIN CALCIUM 80 MG: 20 TABLET, FILM COATED ORAL at 08:02

## 2022-02-10 RX ADMIN — Medication 400 MG: at 08:02

## 2022-02-10 RX ADMIN — POLYETHYLENE GLYCOL 3350 17 G: 17 POWDER, FOR SOLUTION ORAL at 08:02

## 2022-02-10 RX ADMIN — BACLOFEN 10 MG: 10 TABLET ORAL at 04:02

## 2022-02-10 RX ADMIN — FAMOTIDINE 20 MG: 20 TABLET ORAL at 08:02

## 2022-02-10 RX ADMIN — BACLOFEN 10 MG: 10 TABLET ORAL at 08:02

## 2022-02-10 RX ADMIN — FUROSEMIDE 80 MG: 80 TABLET ORAL at 05:02

## 2022-02-10 RX ADMIN — OXYCODONE HYDROCHLORIDE AND ACETAMINOPHEN 1 TABLET: 5; 325 TABLET ORAL at 03:02

## 2022-02-10 RX ADMIN — SENNOSIDES AND DOCUSATE SODIUM 2 TABLET: 50; 8.6 TABLET ORAL at 08:02

## 2022-02-10 RX ADMIN — INSULIN ASPART 2 UNITS: 100 INJECTION, SOLUTION INTRAVENOUS; SUBCUTANEOUS at 05:02

## 2022-02-10 RX ADMIN — OXYCODONE HYDROCHLORIDE AND ACETAMINOPHEN 1 TABLET: 5; 325 TABLET ORAL at 02:02

## 2022-02-10 RX ADMIN — LISINOPRIL 10 MG: 10 TABLET ORAL at 08:02

## 2022-02-10 RX ADMIN — SENNOSIDES AND DOCUSATE SODIUM 1 TABLET: 50; 8.6 TABLET ORAL at 08:02

## 2022-02-10 RX ADMIN — FUROSEMIDE 80 MG: 80 TABLET ORAL at 08:02

## 2022-02-10 RX ADMIN — LISINOPRIL 20 MG: 20 TABLET ORAL at 08:02

## 2022-02-10 RX ADMIN — WARFARIN SODIUM 7.5 MG: 7.5 TABLET ORAL at 04:02

## 2022-02-10 RX ADMIN — Medication 1 G: at 08:02

## 2022-02-10 NOTE — ASSESSMENT & PLAN NOTE
-S/P DT HM3 1/13/21  -Current speed 5000  -INR goal 2.0-3.0. Current INR subtherapeutic. Coumadin restarted as above.  -ASA stopped.   -LDH stable    Procedure: Device Interrogation Including analysis of device parameters  Current Settings: Ventricular Assist Device  Review of device function is stable/unstable stable         TXP LVAD INTERROGATIONS 2/10/2022 2/9/2022 2/9/2022 2/9/2022 2/9/2022 2/9/2022 2/9/2022   Type HeartMate3 HeartMate3 HeartMate3 HeartMate3 HeartMate3 HeartMate3 HeartMate3   Flow 4.2 3.5 4.3 4.3 4.0 4.2 4.2   Speed 5000 5000 5000 5000 5000 5000 5000   PI 4.7 7.9 3.7 4.8 5.5 4.9 5   Power (Edwards) 3.4 3.3 3.4 3.4 3.3 3.6 3.4   LSL 4600 4600 4600 4600 4600 4600 4600   Pulsatility - - Intermittent pulse Intermittent pulse Intermittent pulse Intermittent pulse Intermittent pulse

## 2022-02-10 NOTE — PLAN OF CARE
Problem: Occupational Therapy Goal  Goal: Occupational Therapy Goal  Description: Goals set on 1/29 with expiration date 2/12:  Patient will increase functional independence with ADLs by performing:    Supine <> Sit with [place].  Grooming while standing at sink with Mod [place] using DME as needed.   UB Dressing with [place].  LB Dressing with [place].  Step transfer with Mod [place] with DME as needed.  Pt will demonstrate understanding of education provided regarding energy conservation and task modification through teach-back method.     Outcome: Ongoing, Progressing

## 2022-02-10 NOTE — PT/OT/SLP PROGRESS
Physical Therapy   Progress Note/partial Co-tx with OT    Patient Name:  Rocco France  MRN: [id number]    Admit Date: 1/27/2022  Admitting Diagnosis:  Bilateral subdural hematomas  Length of Stay: 14 days  Recent Surgery: * No surgery found *      Recommendations:     Discharge Recommendations: Inpatient Rehabilitation Facility   Equipment recommendations:  3-in-1 commode  Barriers to discharge: Increased level of skilled assistance required and Fall risk     Assessment:     Rocco France is a 66 y.o. male admitted to [facility] [place], [place] on 1/27/2022 with medical diagnosis of Bilateral subdural hematomas. Pt presents with weakness,impaired endurance,impaired self care skills,impaired functional mobilty,gait instability,impaired balance,pain,impaired cardiopulmonary response to activity. Pt is progressing towards goals, but has not yet reached prior level of function. He remains at risk for falls, and required CGA-min A to maintain balance during standing activities. Rocco France would benefit from continued acute PT intervention to improve quality of life, focus on recovery of impairments, provide patient/caregiver education, reduce fall risk, and maximize (I) and safety with functional mobility. Once medically stable, recommending pt discharge to Inpatient Rehabilitation Facility .      Rehab Prognosis: Good    Plan:     During this hospitalization, patient to be seen 4 x/week to address the identified rehab impairments via gait training,therapeutic activities,therapeutic exercises,neuromuscular re-education and progress towards stated goals.     Plan of Care Expires:  03/03/22  Plan of Care reviewed with: patient    This plan of care has been discussed with the patient/caregiver, who was included in its development and is in agreement with the identified goals and treatment plan.     Subjective     Communicated with RN prior to session.  Patient found HOB elevated upon PT entry to room, agreeable to therapy session.  "    Patient/Family Comments/goals: "I didn't expect any of this to happen. There was no warning signs" -- in reference to initial hospital admit     Pain/Comfort:  Pain Rating 1: 4/10  Location - Side 1: Bilateral  Location 1:  (headache)  Pain Addressed 1: Reposition,Distraction,Nurse notified  Pain Rating Post-Intervention 1:  (remained throughout)    Patients cultural, spiritual, Mu-ism conflicts given the current situation: None identified     Objective:     Patient found with: telemetry,LVAD,peripheral IV    General Precautions: Standard, LVAD,fall   Orthopedic Precautions:N/A   Braces: N/A   Oxygen Device: room air    Cognition:  Pt is Alert and Cooperative during session.    Therapist provided skilled verbal and tactile cueing to facilitate the following functional mobility tasks. Listed tasks are focused on recovery of impairments and improving pt's independence and efficiency with bed mobility, transfers and ambulation as able.     Bed Mobility:  Supine > Sit: Stand-by Assistance  Sit > Supine:  N/T    Transfers:   Sit <> Stand Transfer: Contact Guard Assistance from EOB with no AD   Pt required min assist to maintain immediate standing balance due to multi-directional sway/instability                  Gait:  Distance: ~22 ft.   Assistance level: Minimal Assistance  Assistive Device:  no AD , 1 HHA required to maintain balance  Gait Assessment: decreased step length , decreased gait speed, narrow base of support, and unsteady gait     Balance:  Standing: fall risk noted; pt demonstrated impaired static and dynamic standing balance     Outcome Measure: AM-PAC 6 CLICK MOBILITY  Total Score:18     Patient/Caregiver Education and Additional Therapeutic Activities/Exercises     Pt transferred LVAD from wall to battery power with SBA. Pt required increased time for LVAD management, demonstrated difficulty untangling lines and managing consolidation of batteries into vest.     Provided pt/caregiver education " regarding:   PT POC and goals for therapy   Safety with mobility and fall risk   Safe management of AD as needed   Energy conservation techniques   Instruction on use of call button and importance of calling nursing staff for assistance with mobility     Patient/caregiver able to verbalize understanding; will follow-up with pt/caregiver during current admit for additional questions/concerns within scope of practice.     White board updated.     Patient left  sitting EOB  with all lines intact, call button in reach and RN notified.    Goals:     Multidisciplinary Problems       Physical Therapy Goals          Problem: Physical Therapy Goal    Goal Priority Disciplines Outcome Goal Variances Interventions   Physical Therapy Goal     PT, PT/OT Ongoing, Progressing     Description: Goals to be met by: 2022      Patient will increase functional independence with mobility by performin. Supine to sit with Modified [place]  2. Sit to stand transfer with Modified [place]  3. Bed to chair transfer with Modified [place] using the least restrictive device.   4. Gait  x 150 feet with Modified [place] using the least restrictive device.                           Time Tracking:       PT Received On: 02/10/22  PT Start Time: 1344     PT Stop Time: 1420  PT Total Time (min): 36 min     Billable Minutes: Gait Training 15 min and Therapeutic Activity 15 min    02/10/2022

## 2022-02-10 NOTE — PT/OT/SLP PROGRESS
Occupational Therapy   Partial Co-Treatment    Name: Rocco France  MRN: [id number]  Admitting Diagnosis:  Bilateral subdural hematomas       Recommendations:     Discharge Recommendations: rehabilitation facility  Discharge Equipment Recommendations:  3-in-1 commode  Barriers to discharge:  Decreased caregiver support,Inaccessible home environment    Assessment:     Rocco France is a 66 y.o. male with a medical diagnosis of Bilateral subdural hematomas.  He presents with increased fall risk this session with need for higher level of assistance during functional transfers and mobility. Pt with c/o stable headache and . Performance deficits affecting function are weakness,impaired endurance,impaired self care skills,impaired functional mobilty,gait instability,impaired balance,decreased safety awareness,visual deficits,pain. Pt seen in cotreat with PT 2* being at end of day and last scheduled pt for OT and PT. Tx focused on discipline-specific tasks     Rehab Prognosis:  Good; patient would benefit from acute skilled OT services to address these deficits and reach maximum level of function.       Plan:     Patient to be seen 4 x/week to address the above listed problems via self-care/home management,therapeutic activities,therapeutic exercises,neuromuscular re-education  · Plan of Care Expires: 02/28/22  · Plan of Care Reviewed with: patient    Subjective     Pain/Comfort:  · Pain Rating 1: 4/10  · Location - Side 1: Bilateral  · Location - Orientation 1: generalized  · Location 1:  (headache)  · Pain Addressed 1: Nurse notified  · Pain Rating Post-Intervention 1: 4/10    Objective:     Communicated with: RN prior to session.  Patient found sitting edge of bed with telemetry,LVAD,peripheral IV upon OT entry to room.    General Precautions: Standard, fall,LVAD   Orthopedic Precautions:N/A   Braces:    Respiratory Status: Room air     Occupational Performance:     Bed Mobility:    · NT     Functional  Mobility/Transfers:  · Patient completed Sit <> Stand Transfer with contact guard assistance  with  no assistive device   from EOB; pt required increased time to achieve full stance, and Min A needed when upright 2* decreased balance related to dizziness/HA  · Pt declined toilet transfers once in BR 2* dizziness/HA and needing to return to bed  · Functional Mobility: Min A with HHA to/from bathroom with chair follow for safety; pt declined sitting in recliner in bathroom to be rolled back to bed    Activities of Daily Living:  · Grooming: modified independence in sitting 2* pt unable to tolerate while standing at sink  · Upper Body Dressing: modified independence    · Lower Body Dressing: moderate assistance related to stand phase and dizziness  · Toileting: minimum assistance        [facility] 6 Click ADL: 20    Treatment & Education:  Pt ed on OT POC  Pt ed throughout session on visual focus, ankle pumps and pursed lip breathing to help alleviate dizziness  Pt ed on pausing after transitions to acclimate body to positional change  Pt ed on importance of OOBTC and having assist with mobility and ADL    Patient left up in chair with all lines intact, call button in reach and RN notifiedEducation:      GOALS:   Multidisciplinary Problems     Occupational Therapy Goals        Problem: Occupational Therapy Goal    Goal Priority Disciplines Outcome Interventions   Occupational Therapy Goal     OT, PT/OT Ongoing, Progressing    Description: Goals set on 1/29 with expiration date 2/12:  Patient will increase functional independence with ADLs by performing:    Supine <> Sit with [place].  Grooming while standing at sink with Mod [place] using DME as needed.   UB Dressing with [place].  LB Dressing with [place].  Step transfer with Mod [place] with DME as needed.  Pt will demonstrate understanding of education provided regarding energy conservation and task modification through teach-back method.                       Time Tracking:     OT Date of Treatment: 02/10/22  OT Start Time: 1349  OT Stop Time: 1420  OT Total Time (min): 31 min    Billable Minutes:Self Care/Home Management 25               2/10/2022

## 2022-02-10 NOTE — PLAN OF CARE
Plan of care discussed with pt. VSS. A&Ox4; x1 assist. Denies c/o of CP or SOB. Telemetry monitor showing ST. INR 1.2. LVAD dressing change due 2/11/22. No VAD alarms overnight. Pt remains free of injury or falls. All questions addressed.

## 2022-02-10 NOTE — PROGRESS NOTES
UPDATE    SW to Pt's room for ongoing support & assessment of needs. Pt recently stepped down to CSU. Pt presents as AAO x4, calm, cooperative, and asking and answering questions appropriately. Pt shares that his headache persists, but that he does feel stronger and notes he is improving. SW reviewed d/c planning with Pt & current recs for  PT/OT. Pt pleased with this as he would prefer to go home rather than be away from family at Rehab. Pt in agreement with orders for PT/OT being sent back to his original HH - SW will forward orders to [facility] ([phone]  F: [phone]) to advise. SW providing ongoing psychosocial, counseling, & emotional support, education, resources, assistance, and discharge planning as indicated.  SW to continue to follow.

## 2022-02-10 NOTE — PROGRESS NOTES
[facility] - Cardiology Stepdown  Heart Transplant  Progress Note    Patient Name: Rocco France  MRN: [id number]  Admission Date: 1/27/2022  Hospital Length of Stay: 14 days  Attending Physician: Pete Baker MD  Primary Care Provider: Teresa Mendoza NP  Principal Problem:Bilateral subdural hematomas    Subjective:     Interval History: moved to the floor yesterday. INR pending, OT rec HH at this point     Continuous Infusions:   sodium chloride 0.9% 5 mL/hr (02/08/22 0914)     Scheduled Meds:   atorvastatin  80 mg Oral Daily    baclofen  10 mg Oral TID    famotidine  20 mg Oral BID    furosemide  80 mg Oral BID    lisinopriL  10 mg Oral Daily    lisinopriL  20 mg Oral QHS    magnesium oxide  400 mg Oral BID    polyethylene glycol  17 g Oral Daily    senna-docusate 8.6-50 mg  1 tablet Oral Daily    sodium chloride  1 g Oral Daily    warfarin  4 mg Oral Daily     PRN Meds:sodium chloride, acetaminophen, dextrose 10%, glucagon (human recombinant), insulin aspart U-100, oxyCODONE-acetaminophen    Review of patient's allergies indicates:   Allergen Reactions    Pcn [penicillins] Hives     Objective:     Vital Signs (Most Recent):  Temp: 97.9 °F (36.6 °C) (02/10/22 0340)  Pulse: 109 (02/10/22 0637)  Resp: 15 (02/10/22 0340)  BP: (!) 76/0 (02/10/22 0340)  SpO2: (!) 93 % (02/10/22 0340) Vital Signs (24h Range):  Temp:  [97.1 °F (36.2 °C)-98.3 °F (36.8 °C)] 97.9 °F (36.6 °C)  Pulse:  [100-115] 109  Resp:  [9-32] 15  SpO2:  [93 %-100 %] 93 %  BP: (70-78)/(0) 76/0  Arterial Line BP: (85-99)/(65-82) 95/65     Patient Vitals for the past 72 hrs (Last 3 readings):   Weight   02/10/22 0600 78.3 kg (172 lb 9.9 oz)   02/09/22 0300 84.7 kg (186 lb 11.7 oz)   02/07/22 1300 82.3 kg (181 lb 7 oz)     Body mass index is 22.16 kg/m².      Intake/Output Summary (Last 24 hours) at 2/10/2022 0716  Last data filed at 2/10/2022 0345  Gross per 24 hour   Intake 961.25 ml   Output 2200 ml   Net -1238.75 ml        Hemodynamic Parameters:           Physical Exam  Constitutional:       General: He is not in acute distress.     Appearance: He is well-developed.   HENT:      Head: Normocephalic and atraumatic.   Eyes:      General:         Right eye: No discharge.         Left eye: No discharge.   Cardiovascular:      Comments: LVAD and a-line in place  Pulmonary:      Effort: Pulmonary effort is normal. No respiratory distress.   Abdominal:      General: There is no distension.      Palpations: Abdomen is soft.   Musculoskeletal:         General: No deformity.      Cervical back: Neck supple.   Skin:     General: Skin is warm and dry.   Neurological:      Mental Status: He is alert and oriented to person, place, and time.      Comments: Follows commands   Generalized deconditioning  Latoya   Psychiatric:         Behavior: Behavior normal. Behavior is cooperative.         Cognition and Memory: Cognition is not impaired.         Significant Labs:  CBC:  Recent Labs   Lab 02/08/22  0444 02/09/22  0302 02/10/22  0517   WBC 7.70 8.21 8.25  8.25   RBC 3.09* 3.18* 3.21*  3.21*   HGB 9.0* 9.1* 9.0*  9.0*   HCT 28.5* 29.3* 28.5*  28.5*    181 189  189   MCV 92 92 89  89   MCH 29.1 28.6 28.0  28.0   MCHC 31.6* 31.1* 31.6*  31.6*     BNP:  Recent Labs   Lab 02/04/22  0306 02/07/22  0232 02/09/22  0302   * 322* 258*     CMP:  Recent Labs   Lab 02/04/22  0417 02/04/22  0920 02/07/22  0232 02/07/22  0232 02/08/22  0444 02/09/22  0302 02/09/22  0303 02/10/22  0517   *   < > 117*   < > 137*  --  146* 143*  143*   CALCIUM 8.2*   < > 8.4*   < > 8.5*  --  8.5* 8.6*  8.6*   ALBUMIN 2.2*  --  2.3*  --   --  2.5*  --   --    PROT 6.8  --  6.6  --   --  7.1  --   --       < > 137   < > 131*  --  135* 131*  131*   K 3.7   < > 4.0   < > 3.7  --  4.0 3.9  3.9   CO2 22*   < > 24   < > 22*  --  26 27  27   *   < > 107   < > 102  --  101 99  99   BUN 13   < > 17   < > 15  --  17 18  18   CREATININE 0.7   <  > 0.7   < > 0.7  --  0.8 0.8  0.8   ALKPHOS 95  --  119  --   --  125  --   --    ALT 8*  --  7*  --   --  <5*  --   --    AST 17  --  13  --   --  12  --   --    BILITOT 1.3*  --  1.1*  --   --  1.3*  --   --     < > = values in this interval not displayed.      Coagulation:   Recent Labs   Lab 02/08/22  0444 02/09/22  0302 02/10/22  0517   INR 1.2 1.2 1.2  1.2   APTT 29.1 28.5 29.7  29.7     LDH:  Recent Labs   Lab 02/08/22  0444 02/09/22  0302 02/10/22  0517    193 171     Microbiology:  Microbiology Results (last 7 days)     ** No results found for the last 168 hours. **          I have reviewed all pertinent labs within the past 24 hours.    Estimated Creatinine Clearance: 100.6 mL/min (based on SCr of 0.8 mg/dL).    Diagnostic Results:  I have reviewed and interpreted all pertinent imaging results/findings within the past 24 hours.    Assessment and Plan:     67 yo BM with stage D CHF due to NICMP s/p HM3, s/p ICD, HTN, HLD, T2DM was transferred from outside hospital emergency room after he was found to have subdural hematoma/subarachnoid hemorrhage.  Patient presented to the ER after he was found to have a syncopal event.  Also patient had respiratory distress on arrival for which he was intubated.  Patient was transferred here for further evaluation.  On arrival patient is intubated and is on propofol.  Neurosurgery and Neuro Critical Care were immediately informed.  Neurosurgery recommended to repeat CT after reviewing the prior CT done in the ER in [place].  INR on arrival is 1.6.  He also had a low flow with a increased PI around 4:00 p.m. this evening.  According to wife patient was complaining of headache for the last 2 weeks..  [place] went to post office this [place] and she does not know what exactly happened..  ICD was interrogated and did not show any VT/VF.       * Bilateral subdural hematomas  -Patient had a traumatic subdural, subarachnoid hemorrhage after fall on 1/27. Currently no  focal deficits  -NCC signed off. NSGY following.   -INR subtherapeutic. Coumadin restarted 2/5 at low dose with plans to watch INR rise slowly. PharmD to adjust dosing  -Goal Na+ > 135, slowly trickled down days after stopping hypertonic saline. Salt tabs given prn. Will monitor for improvement with increased diuresis.  -Keppra started as AED. EEG negative for seizures.  -Transcranial dopplers done 1/31 without evidence of vasospasm.  -Continue q1h neurochecks.    Acute respiratory failure requiring reintubation  - Extubated 1/28 and on RA  - Tx'd with remdesivir which was since discontinued. Asymptomatic from covid perspective    LVAD (left ventricular assist device) present  -S/P DT HM3 1/13/21  -Current speed 5000  -INR goal 2.0-3.0. Current INR subtherapeutic. Coumadin restarted as above.  -ASA stopped.   -LDH stable    Procedure: Device Interrogation Including analysis of device parameters  Current Settings: Ventricular Assist Device  Review of device function is stable/unstable stable         TXP LVAD INTERROGATIONS 2/10/2022 2/9/2022 2/9/2022 2/9/2022 2/9/2022 2/9/2022 2/9/2022   Type HeartMate3 HeartMate3 HeartMate3 HeartMate3 HeartMate3 HeartMate3 HeartMate3   Flow 4.2 3.5 4.3 4.3 4.0 4.2 4.2   Speed 5000 5000 5000 5000 5000 5000 5000   PI 4.7 7.9 3.7 4.8 5.5 4.9 5   Power ([place]) 3.4 3.3 3.4 3.4 3.3 3.6 3.4   LSL 4600 4600 4600 4600 4600 4600 4600   Pulsatility - - Intermittent pulse Intermittent pulse Intermittent pulse Intermittent pulse Intermittent pulse       Type 2 diabetes mellitus without complication  -SSI  ACHS     Essential hypertension  -Levophed off. MAP goal >65  -Continue Lisinopril 10qam/20qhs,  doxazosin 2mg BID initiated but discontinued yesterday as BP controlled with adjust lisinopril dosing. Will monitor closely and reinitiate if elevated Bps return        CT Fam  Heart Transplant  [facility] - Cardiology Stepdown

## 2022-02-10 NOTE — SUBJECTIVE & OBJECTIVE
Interval History 2/10/2022:  Patient is seen for follow-up PM&R evaluation and recommendations: Participating w/ therapy.    HPI, Past Medical, Family, and Social History remains the same as documented in the initial encounter.    Scheduled Medications:    atorvastatin  80 mg Oral Daily    baclofen  10 mg Oral TID    famotidine  20 mg Oral BID    furosemide  80 mg Oral BID    lisinopriL  10 mg Oral Daily    lisinopriL  20 mg Oral QHS    magnesium oxide  400 mg Oral BID    polyethylene glycol  17 g Oral Daily    senna-docusate 8.6-50 mg  1 tablet Oral Daily    sodium chloride  1 g Oral Daily    [START ON 2/11/2022] warfarin  5 mg Oral Daily    warfarin  7.5 mg Oral Once       Diagnostic Results: Labs: Reviewed    PRN Medications: sodium chloride, acetaminophen, dextrose 10%, glucagon (human recombinant), insulin aspart U-100, oxyCODONE-acetaminophen    Review of Systems   Constitutional: Positive for activity change.   Respiratory: Negative for shortness of breath.    Cardiovascular: Negative for chest pain.   Musculoskeletal: Positive for gait problem.   Neurological: Positive for weakness and headaches.   Psychiatric/Behavioral: Negative for confusion.     Objective:     Vital Signs (Most Recent):  Temp: 97.9 °F (36.6 °C) (02/10/22 0751)  Pulse: 103 (02/10/22 0751)  Resp: 15 (02/10/22 0751)  BP: (!) 72/0 (02/10/22 0751)  SpO2: 98 % (02/10/22 0751)    Vital Signs (24h Range):  Temp:  [97.1 °F (36.2 °C)-98.3 °F (36.8 °C)] 97.9 °F (36.6 °C)  Pulse:  [100-115] 103  Resp:  [9-32] 15  SpO2:  [93 %-100 %] 98 %  BP: (70-78)/(0) 72/0  Arterial Line BP: (92-95)/(65-70) 95/65     Physical Exam  Constitutional:       General: He is not in acute distress.     Appearance: He is well-developed.   HENT:      Head: Normocephalic and atraumatic.   Eyes:      General:         Right eye: No discharge.         Left eye: No discharge.   Cardiovascular:      Comments: LVAD   Pulmonary:      Effort: Pulmonary effort is normal.  No respiratory distress.   Abdominal:      General: There is no distension.      Palpations: Abdomen is soft.   Musculoskeletal:         General: No deformity.      Cervical back: Neck supple.   Skin:     General: Skin is warm and dry.   Neurological:      Mental Status: He is alert and oriented to person, place, and time.      Comments: Follows commands   Generalized deconditioning  [place]   Psychiatric:         Behavior: Behavior normal. Behavior is cooperative.         Cognition and Memory: Cognition is not impaired.       NEUROLOGICAL EXAMINATION:     MENTAL STATUS   Oriented to person, place, and time.

## 2022-02-10 NOTE — PROGRESS NOTES
Notified CT Vogel of pt complaining of headache 4/10. Gave PRN medications. No new orders given. WCTM

## 2022-02-10 NOTE — PROGRESS NOTES
"02/10/2022  Vanessa Ramires    Current provider:  Pete Baker MD    Device interrogation:  TXP LVAD INTERROGATIONS 2/10/2022 2/10/2022 2/9/2022 2/9/2022 2/9/2022 2/9/2022 2/9/2022   Type HeartMate3 HeartMate3 HeartMate3 HeartMate3 HeartMate3 HeartMate3 HeartMate3   Flow 4.4 4.2 3.5 4.3 4.3 4.0 4.2   Speed 5000 5000 5000 5000 5000 5000 5000   PI 4.0 4.7 7.9 3.7 4.8 5.5 4.9   Power ([place]wards) 3.4 3.4 3.3 3.4 3.4 3.3 3.6   LSL 4600 4600 4600 4600 4600 4600 4600   Pulsatility Intermittent pulse - - Intermittent pulse Intermittent pulse Intermittent pulse Intermittent pulse          As floor rounding has been requested to be limited during COVID-19 patient quarantine by Infectious Disease and leadership, only "electronic" rounding has been performed on this mechanical assist device patient.  Electronic rounding includes verifying in Epic that current settings and measurements for the device have been documented appropriately and that they are within limits.  Additionally, this rounding verifies that the EQUIPMENT section of the VAD flowsheet is documented in Epic, specifically checking the presence of emergency equipment and controller self test.  Any discrepancies will be related to the care team.    For implantable VADs: Interrogation of Ventricular assist device was performed with analysis of device parameters and review of device function. I have personally reviewed the interrogation findings and agree with findings as stated.     In emergency, the nursing units have been notified to contact the perfusion department either by:  Calling [phone] from 630am to 4pm Mon thru Fri, or by contacting the hospital  from 4pm to 630am and on weekends and asking to speak with the perfusionist on call.    Vanessa Ramires  11:57 AM    "

## 2022-02-10 NOTE — PROGRESS NOTES
Notified CT Vogel of pt still complaining of 4/10 HA after pain meds. PA ordered to get CT scan of head. WCTM

## 2022-02-10 NOTE — PROGRESS NOTES
[facility]  Physical Medicine & Rehab  Progress Note    Patient Name: Rocco France  MRN: [id number]  Admission Date: 1/27/2022  Length of Stay: 14 days  Attending Physician: Pete Baker MD    Subjective:     Principal Problem:Bilateral subdural hematomas    Hospital Course:   2.7: PT-BM and sit to stand CGA. Ambulated 6 ft fwd/bwd Min.   02/08/2022: OT-Bed mobility SBA. Static sitting EOB SBS with mild dizziness. Sit to stand and transfers CGA.  Ambulated 8 FT CGA.  UBD/toilet CGA and F/G Mod (I).   2/9: No therapy.     Interval History 2/10/2022:  Patient is seen for follow-up PM&R evaluation and recommendations: Participating w/ therapy.    HPI, Past Medical, Family, and Social History remains the same as documented in the initial encounter.    Scheduled Medications:    atorvastatin  80 mg Oral Daily    baclofen  10 mg Oral TID    famotidine  20 mg Oral BID    furosemide  80 mg Oral BID    lisinopriL  10 mg Oral Daily    lisinopriL  20 mg Oral QHS    magnesium oxide  400 mg Oral BID    polyethylene glycol  17 g Oral Daily    senna-docusate 8.6-50 mg  1 tablet Oral Daily    sodium chloride  1 g Oral Daily    [START ON 2/11/2022] warfarin  5 mg Oral Daily    warfarin  7.5 mg Oral Once       Diagnostic Results: Labs: Reviewed    PRN Medications: sodium chloride, acetaminophen, dextrose 10%, glucagon (human recombinant), insulin aspart U-100, oxyCODONE-acetaminophen    Review of Systems   Constitutional: Positive for activity change.   Respiratory: Negative for shortness of breath.    Cardiovascular: Negative for chest pain.   Musculoskeletal: Positive for gait problem.   Neurological: Positive for weakness and headaches.   Psychiatric/Behavioral: Negative for confusion.     Objective:     Vital Signs (Most Recent):  Temp: 97.9 °F (36.6 °C) (02/10/22 0751)  Pulse: 103 (02/10/22 0751)  Resp: 15 (02/10/22 0751)  BP: (!) 72/0 (02/10/22 0751)  SpO2: 98 % (02/10/22 0751)    Vital  Signs (24h Range):  Temp:  [97.1 °F (36.2 °C)-98.3 °F (36.8 °C)] 97.9 °F (36.6 °C)  Pulse:  [100-115] 103  Resp:  [9-32] 15  SpO2:  [93 %-100 %] 98 %  BP: (70-78)/(0) 72/0  Arterial Line BP: (92-95)/(65-70) 95/65     Physical Exam  Constitutional:       General: He is not in acute distress.     Appearance: He is well-developed.   HENT:      Head: Normocephalic and atraumatic.   Eyes:      General:         Right eye: No discharge.         Left eye: No discharge.   Cardiovascular:      Comments: LVAD   Pulmonary:      Effort: Pulmonary effort is normal. No respiratory distress.   Abdominal:      General: There is no distension.      Palpations: Abdomen is soft.   Musculoskeletal:         General: No deformity.      Cervical back: Neck supple.   Skin:     General: Skin is warm and dry.   Neurological:      Mental Status: He is alert and oriented to person, place, and time.      Comments: Follows commands   Generalized deconditioning  Latoya   Psychiatric:         Behavior: Behavior normal. Behavior is cooperative.         Cognition and Memory: Cognition is not impaired.     Assessment/Plan:      * Bilateral subdural hematomas  -Repeat imaging revealed CTA with new cisternal SAH, negative aneurysm or vascular malformation on CTA, likely nonaneurysmal perimesencephalic SAH per NSGY  - Repeat CHT 2/7 stable. Repeat CTH pending when INR therapeutic per NSGY    See hospital course for functional, cognitive/speech/language, and nutrition/swallow status.      Recommendations  -  Monitor sleep disturbances and establish consistent sleep-wake cycle  -  Environmental modifications to limit agitation/confusion   -  Reorient patient to person, place, time, and situation on each encounter  -  Avoid restraints  -  May benefit from 24/7 supervision  -  Avoid/limit medications that can worsen delirium (benzodiazepines, antihistamines, anticholinergics, hypnotics, opiates)  -  Encourage mobility, OOB in chair, and early ambulation as  appropriate  -  PT/OT evaluate and treat  -  SLP speech and cognitive evaluate and treat  -  Monitor for bowel and bladder dysfunction  -  Monitor for and prevent skin breakdown and pressure ulcers  · Early mobility, repositioning/weight shifting every 20-30 minutes when sitting, turn patient every 2 hours, proper mattress/overlay and chair cushioning, pressure relief/heel protector boots      Acute respiratory failure requiring reintubation  -extubated on 1/28, now on RA    LVAD (left ventricular assist device) present  -s/p LVAD 1/13/21  --NR goal 2.0-3.0  -INR subtherapeutic, primary team managing    Reviewed case with Collaborative MD. Participating with therapy and requesting to go home w/ HH therapy; however, will follow along for updated PT notes and therapeutic INR.      America Ortega NP  Department of Physical Medicine & Rehab   [facility]

## 2022-02-10 NOTE — PLAN OF CARE
Plan of care discussed with patient.  Patient ambulating independently, fall precautions in place. LVAD DP and numbers WNL, smooth LVAD hum. Patient has no complaints of pain. Vitals WNL, pt awake alert and oriented X 4. Worked well with therapy today, ambulated in room. Discussed medications and care. Patient has no questions at this time. Will continue to monitor.

## 2022-02-10 NOTE — SUBJECTIVE & OBJECTIVE
Interval History: moved to the floor yesterday. INR pending, OT rec HH at this point     Continuous Infusions:   sodium chloride 0.9% 5 mL/hr (02/08/22 0914)     Scheduled Meds:   atorvastatin  80 mg Oral Daily    baclofen  10 mg Oral TID    famotidine  20 mg Oral BID    furosemide  80 mg Oral BID    lisinopriL  10 mg Oral Daily    lisinopriL  20 mg Oral QHS    magnesium oxide  400 mg Oral BID    polyethylene glycol  17 g Oral Daily    senna-docusate 8.6-50 mg  1 tablet Oral Daily    sodium chloride  1 g Oral Daily    warfarin  4 mg Oral Daily     PRN Meds:sodium chloride, acetaminophen, dextrose 10%, glucagon (human recombinant), insulin aspart U-100, oxyCODONE-acetaminophen    Review of patient's allergies indicates:   Allergen Reactions    Pcn [penicillins] Hives     Objective:     Vital Signs (Most Recent):  Temp: 97.9 °F (36.6 °C) (02/10/22 0340)  Pulse: 109 (02/10/22 0637)  Resp: 15 (02/10/22 0340)  BP: (!) 76/0 (02/10/22 0340)  SpO2: (!) 93 % (02/10/22 0340) Vital Signs (24h Range):  Temp:  [97.1 °F (36.2 °C)-98.3 °F (36.8 °C)] 97.9 °F (36.6 °C)  Pulse:  [100-115] 109  Resp:  [9-32] 15  SpO2:  [93 %-100 %] 93 %  BP: (70-78)/(0) 76/0  Arterial Line BP: (85-99)/(65-82) 95/65     Patient Vitals for the past 72 hrs (Last 3 readings):   Weight   02/10/22 0600 78.3 kg (172 lb 9.9 oz)   02/09/22 0300 84.7 kg (186 lb 11.7 oz)   02/07/22 1300 82.3 kg (181 lb 7 oz)     Body mass index is 22.16 kg/m².      Intake/Output Summary (Last 24 hours) at 2/10/2022 0716  Last data filed at 2/10/2022 0345  Gross per 24 hour   Intake 961.25 ml   Output 2200 ml   Net -1238.75 ml       Hemodynamic Parameters:           Physical Exam  Constitutional:       General: He is not in acute distress.     Appearance: He is well-developed.   HENT:      Head: Normocephalic and atraumatic.   Eyes:      General:         Right eye: No discharge.         Left eye: No discharge.   Cardiovascular:      Comments: LVAD and a-line in  place  Pulmonary:      Effort: Pulmonary effort is normal. No respiratory distress.   Abdominal:      General: There is no distension.      Palpations: Abdomen is soft.   Musculoskeletal:         General: No deformity.      Cervical back: Neck supple.   Skin:     General: Skin is warm and dry.   Neurological:      Mental Status: He is alert and oriented to person, place, and time.      Comments: Follows commands   Generalized deconditioning  Latoya   Psychiatric:         Behavior: Behavior normal. Behavior is cooperative.         Cognition and Memory: Cognition is not impaired.         Significant Labs:  CBC:  Recent Labs   Lab 02/08/22  0444 02/09/22  0302 02/10/22  0517   WBC 7.70 8.21 8.25  8.25   RBC 3.09* 3.18* 3.21*  3.21*   HGB 9.0* 9.1* 9.0*  9.0*   HCT 28.5* 29.3* 28.5*  28.5*    181 189  189   MCV 92 92 89  89   MCH 29.1 28.6 28.0  28.0   MCHC 31.6* 31.1* 31.6*  31.6*     BNP:  Recent Labs   Lab 02/04/22  0306 02/07/22  0232 02/09/22  0302   * 322* 258*     CMP:  Recent Labs   Lab 02/04/22  0417 02/04/22  0920 02/07/22  0232 02/07/22  0232 02/08/22  0444 02/09/22  0302 02/09/22  0303 02/10/22  0517   *   < > 117*   < > 137*  --  146* 143*  143*   CALCIUM 8.2*   < > 8.4*   < > 8.5*  --  8.5* 8.6*  8.6*   ALBUMIN 2.2*  --  2.3*  --   --  2.5*  --   --    PROT 6.8  --  6.6  --   --  7.1  --   --       < > 137   < > 131*  --  135* 131*  131*   K 3.7   < > 4.0   < > 3.7  --  4.0 3.9  3.9   CO2 22*   < > 24   < > 22*  --  26 27  27   *   < > 107   < > 102  --  101 99  99   BUN 13   < > 17   < > 15  --  17 18  18   CREATININE 0.7   < > 0.7   < > 0.7  --  0.8 0.8  0.8   ALKPHOS 95  --  119  --   --  125  --   --    ALT 8*  --  7*  --   --  <5*  --   --    AST 17  --  13  --   --  12  --   --    BILITOT 1.3*  --  1.1*  --   --  1.3*  --   --     < > = values in this interval not displayed.      Coagulation:   Recent Labs   Lab 02/08/22  0444 02/09/22  7585  02/10/22  0517   INR 1.2 1.2 1.2  1.2   APTT 29.1 28.5 29.7  29.7     LDH:  Recent Labs   Lab 02/08/22  0444 02/09/22  0302 02/10/22  0517    193 171     Microbiology:  Microbiology Results (last 7 days)     ** No results found for the last 168 hours. **          I have reviewed all pertinent labs within the past 24 hours.    Estimated Creatinine Clearance: 100.6 mL/min (based on SCr of 0.8 mg/dL).    Diagnostic Results:  I have reviewed and interpreted all pertinent imaging results/findings within the past 24 hours.

## 2022-02-11 LAB
ALBUMIN SERPL BCP-MCNC: 2.6 G/DL (ref 3.5–5.2)
ALBUMIN SERPL BCP-MCNC: 2.6 G/DL (ref 3.5–5.2)
ALP SERPL-CCNC: 137 U/L (ref 55–135)
ALP SERPL-CCNC: 137 U/L (ref 55–135)
ALT SERPL W/O P-5'-P-CCNC: 8 U/L (ref 10–44)
ALT SERPL W/O P-5'-P-CCNC: 8 U/L (ref 10–44)
ANION GAP SERPL CALC-SCNC: 10 MMOL/L (ref 8–16)
ANION GAP SERPL CALC-SCNC: 10 MMOL/L (ref 8–16)
APTT BLDCRRT: 28.6 SEC (ref 21–32)
APTT BLDCRRT: 28.6 SEC (ref 21–32)
AST SERPL-CCNC: 15 U/L (ref 10–40)
AST SERPL-CCNC: 15 U/L (ref 10–40)
BASOPHILS # BLD AUTO: 0.03 K/UL (ref 0–0.2)
BASOPHILS # BLD AUTO: 0.03 K/UL (ref 0–0.2)
BASOPHILS NFR BLD: 0.3 % (ref 0–1.9)
BASOPHILS NFR BLD: 0.3 % (ref 0–1.9)
BILIRUB DIRECT SERPL-MCNC: 0.7 MG/DL (ref 0.1–0.3)
BILIRUB DIRECT SERPL-MCNC: 0.7 MG/DL (ref 0.1–0.3)
BILIRUB SERPL-MCNC: 1.4 MG/DL (ref 0.1–1)
BILIRUB SERPL-MCNC: 1.4 MG/DL (ref 0.1–1)
BNP SERPL-MCNC: 302 PG/ML (ref 0–99)
BUN SERPL-MCNC: 23 MG/DL (ref 8–23)
BUN SERPL-MCNC: 23 MG/DL (ref 8–23)
CALCIUM SERPL-MCNC: 9 MG/DL (ref 8.7–10.5)
CALCIUM SERPL-MCNC: 9 MG/DL (ref 8.7–10.5)
CHLORIDE SERPL-SCNC: 99 MMOL/L (ref 95–110)
CHLORIDE SERPL-SCNC: 99 MMOL/L (ref 95–110)
CO2 SERPL-SCNC: 25 MMOL/L (ref 23–29)
CO2 SERPL-SCNC: 25 MMOL/L (ref 23–29)
CREAT SERPL-MCNC: 0.8 MG/DL (ref 0.5–1.4)
CREAT SERPL-MCNC: 0.8 MG/DL (ref 0.5–1.4)
CRP SERPL-MCNC: 24.2 MG/L (ref 0–8.2)
CRP SERPL-MCNC: 24.2 MG/L (ref 0–8.2)
DIFFERENTIAL METHOD: ABNORMAL
DIFFERENTIAL METHOD: ABNORMAL
EOSINOPHIL # BLD AUTO: 0.1 K/UL (ref 0–0.5)
EOSINOPHIL # BLD AUTO: 0.1 K/UL (ref 0–0.5)
EOSINOPHIL NFR BLD: 1.3 % (ref 0–8)
EOSINOPHIL NFR BLD: 1.3 % (ref 0–8)
ERYTHROCYTE [DISTWIDTH] IN BLOOD BY AUTOMATED COUNT: 15.2 % (ref 11.5–14.5)
ERYTHROCYTE [DISTWIDTH] IN BLOOD BY AUTOMATED COUNT: 15.2 % (ref 11.5–14.5)
EST. GFR  (AFRICAN AMERICAN): >60 ML/MIN/1.73 M^2
EST. GFR  (AFRICAN AMERICAN): >60 ML/MIN/1.73 M^2
EST. GFR  (NON AFRICAN AMERICAN): >60 ML/MIN/1.73 M^2
EST. GFR  (NON AFRICAN AMERICAN): >60 ML/MIN/1.73 M^2
GLUCOSE SERPL-MCNC: 152 MG/DL (ref 70–110)
GLUCOSE SERPL-MCNC: 152 MG/DL (ref 70–110)
HCT VFR BLD AUTO: 30.7 % (ref 40–54)
HCT VFR BLD AUTO: 30.7 % (ref 40–54)
HGB BLD-MCNC: 9.5 G/DL (ref 14–18)
HGB BLD-MCNC: 9.5 G/DL (ref 14–18)
IMM GRANULOCYTES # BLD AUTO: 0.02 K/UL (ref 0–0.04)
IMM GRANULOCYTES # BLD AUTO: 0.02 K/UL (ref 0–0.04)
IMM GRANULOCYTES NFR BLD AUTO: 0.2 % (ref 0–0.5)
IMM GRANULOCYTES NFR BLD AUTO: 0.2 % (ref 0–0.5)
INR PPP: 1.2 (ref 0.8–1.2)
INR PPP: 1.2 (ref 0.8–1.2)
LDH SERPL L TO P-CCNC: 196 U/L (ref 110–260)
LYMPHOCYTES # BLD AUTO: 1.6 K/UL (ref 1–4.8)
LYMPHOCYTES # BLD AUTO: 1.6 K/UL (ref 1–4.8)
LYMPHOCYTES NFR BLD: 17.4 % (ref 18–48)
LYMPHOCYTES NFR BLD: 17.4 % (ref 18–48)
MAGNESIUM SERPL-MCNC: 1.9 MG/DL (ref 1.6–2.6)
MAGNESIUM SERPL-MCNC: 1.9 MG/DL (ref 1.6–2.6)
MCH RBC QN AUTO: 28.9 PG (ref 27–31)
MCH RBC QN AUTO: 28.9 PG (ref 27–31)
MCHC RBC AUTO-ENTMCNC: 30.9 G/DL (ref 32–36)
MCHC RBC AUTO-ENTMCNC: 30.9 G/DL (ref 32–36)
MCV RBC AUTO: 93 FL (ref 82–98)
MCV RBC AUTO: 93 FL (ref 82–98)
MONOCYTES # BLD AUTO: 0.8 K/UL (ref 0.3–1)
MONOCYTES # BLD AUTO: 0.8 K/UL (ref 0.3–1)
MONOCYTES NFR BLD: 9.4 % (ref 4–15)
MONOCYTES NFR BLD: 9.4 % (ref 4–15)
NEUTROPHILS # BLD AUTO: 6.4 K/UL (ref 1.8–7.7)
NEUTROPHILS # BLD AUTO: 6.4 K/UL (ref 1.8–7.7)
NEUTROPHILS NFR BLD: 71.4 % (ref 38–73)
NEUTROPHILS NFR BLD: 71.4 % (ref 38–73)
NRBC BLD-RTO: 0 /100 WBC
NRBC BLD-RTO: 0 /100 WBC
PHOSPHATE SERPL-MCNC: 2.8 MG/DL (ref 2.7–4.5)
PHOSPHATE SERPL-MCNC: 2.8 MG/DL (ref 2.7–4.5)
PLATELET # BLD AUTO: 179 K/UL (ref 150–450)
PLATELET # BLD AUTO: 179 K/UL (ref 150–450)
PMV BLD AUTO: 11.4 FL (ref 9.2–12.9)
PMV BLD AUTO: 11.4 FL (ref 9.2–12.9)
POCT GLUCOSE: 157 MG/DL (ref 70–110)
POCT GLUCOSE: 163 MG/DL (ref 70–110)
POCT GLUCOSE: 199 MG/DL (ref 70–110)
POTASSIUM SERPL-SCNC: 3.9 MMOL/L (ref 3.5–5.1)
POTASSIUM SERPL-SCNC: 3.9 MMOL/L (ref 3.5–5.1)
PREALB SERPL-MCNC: 13 MG/DL (ref 20–43)
PREALB SERPL-MCNC: 13 MG/DL (ref 20–43)
PROT SERPL-MCNC: 7.7 G/DL (ref 6–8.4)
PROT SERPL-MCNC: 7.7 G/DL (ref 6–8.4)
PROTHROMBIN TIME: 12.3 SEC (ref 9–12.5)
PROTHROMBIN TIME: 12.3 SEC (ref 9–12.5)
RBC # BLD AUTO: 3.29 M/UL (ref 4.6–6.2)
RBC # BLD AUTO: 3.29 M/UL (ref 4.6–6.2)
SODIUM SERPL-SCNC: 134 MMOL/L (ref 136–145)
SODIUM SERPL-SCNC: 134 MMOL/L (ref 136–145)
WBC # BLD AUTO: 8.92 K/UL (ref 3.9–12.7)
WBC # BLD AUTO: 8.92 K/UL (ref 3.9–12.7)

## 2022-02-11 PROCEDURE — 83735 ASSAY OF MAGNESIUM: CPT | Performed by: HOSPITALIST

## 2022-02-11 PROCEDURE — 86140 C-REACTIVE PROTEIN: CPT | Performed by: HOSPITALIST

## 2022-02-11 PROCEDURE — 85025 COMPLETE CBC W/AUTO DIFF WBC: CPT | Performed by: HOSPITALIST

## 2022-02-11 PROCEDURE — 99232 SBSQ HOSP IP/OBS MODERATE 35: CPT | Mod: ,,, | Performed by: NURSE PRACTITIONER

## 2022-02-11 PROCEDURE — 25000003 PHARM REV CODE 250: Performed by: HOSPITALIST

## 2022-02-11 PROCEDURE — 83880 ASSAY OF NATRIURETIC PEPTIDE: CPT | Performed by: HOSPITALIST

## 2022-02-11 PROCEDURE — 20600001 HC STEP DOWN PRIVATE ROOM

## 2022-02-11 PROCEDURE — 83615 LACTATE (LD) (LDH) ENZYME: CPT | Performed by: HOSPITALIST

## 2022-02-11 PROCEDURE — 99232 PR SUBSEQUENT HOSPITAL CARE,LEVL II: ICD-10-PCS | Mod: ,,, | Performed by: NURSE PRACTITIONER

## 2022-02-11 PROCEDURE — 27000248 HC VAD-ADDITIONAL DAY

## 2022-02-11 PROCEDURE — 99233 SBSQ HOSP IP/OBS HIGH 50: CPT | Mod: 95,,, | Performed by: PHYSICIAN ASSISTANT

## 2022-02-11 PROCEDURE — 85610 PROTHROMBIN TIME: CPT | Performed by: HOSPITALIST

## 2022-02-11 PROCEDURE — 25000003 PHARM REV CODE 250: Performed by: INTERNAL MEDICINE

## 2022-02-11 PROCEDURE — 84100 ASSAY OF PHOSPHORUS: CPT | Performed by: HOSPITALIST

## 2022-02-11 PROCEDURE — 25000003 PHARM REV CODE 250: Performed by: PHYSICIAN ASSISTANT

## 2022-02-11 PROCEDURE — 84134 ASSAY OF PREALBUMIN: CPT | Performed by: HOSPITALIST

## 2022-02-11 PROCEDURE — 99233 PR SUBSEQUENT HOSPITAL CARE,LEVL III: ICD-10-PCS | Mod: 95,,, | Performed by: PHYSICIAN ASSISTANT

## 2022-02-11 PROCEDURE — 80048 BASIC METABOLIC PNL TOTAL CA: CPT | Performed by: HOSPITALIST

## 2022-02-11 PROCEDURE — 80076 HEPATIC FUNCTION PANEL: CPT | Performed by: HOSPITALIST

## 2022-02-11 PROCEDURE — 85730 THROMBOPLASTIN TIME PARTIAL: CPT | Performed by: HOSPITALIST

## 2022-02-11 PROCEDURE — 25000003 PHARM REV CODE 250: Performed by: STUDENT IN AN ORGANIZED HEALTH CARE EDUCATION/TRAINING PROGRAM

## 2022-02-11 RX ADMIN — WARFARIN SODIUM 5 MG: 5 TABLET ORAL at 05:02

## 2022-02-11 RX ADMIN — LISINOPRIL 20 MG: 20 TABLET ORAL at 08:02

## 2022-02-11 RX ADMIN — POLYETHYLENE GLYCOL 3350 17 G: 17 POWDER, FOR SOLUTION ORAL at 10:02

## 2022-02-11 RX ADMIN — FAMOTIDINE 20 MG: 20 TABLET ORAL at 10:02

## 2022-02-11 RX ADMIN — BACLOFEN 10 MG: 10 TABLET ORAL at 08:02

## 2022-02-11 RX ADMIN — Medication 400 MG: at 10:02

## 2022-02-11 RX ADMIN — Medication 1 G: at 10:02

## 2022-02-11 RX ADMIN — SENNOSIDES AND DOCUSATE SODIUM 1 TABLET: 50; 8.6 TABLET ORAL at 10:02

## 2022-02-11 RX ADMIN — Medication 400 MG: at 08:02

## 2022-02-11 RX ADMIN — BACLOFEN 10 MG: 10 TABLET ORAL at 05:02

## 2022-02-11 RX ADMIN — OXYCODONE HYDROCHLORIDE AND ACETAMINOPHEN 1 TABLET: 5; 325 TABLET ORAL at 08:02

## 2022-02-11 RX ADMIN — ATORVASTATIN CALCIUM 80 MG: 20 TABLET, FILM COATED ORAL at 10:02

## 2022-02-11 RX ADMIN — FUROSEMIDE 80 MG: 80 TABLET ORAL at 05:02

## 2022-02-11 RX ADMIN — BACLOFEN 10 MG: 10 TABLET ORAL at 10:02

## 2022-02-11 RX ADMIN — LISINOPRIL 10 MG: 10 TABLET ORAL at 10:02

## 2022-02-11 RX ADMIN — FAMOTIDINE 20 MG: 20 TABLET ORAL at 08:02

## 2022-02-11 RX ADMIN — FUROSEMIDE 80 MG: 80 TABLET ORAL at 10:02

## 2022-02-11 NOTE — SUBJECTIVE & OBJECTIVE
Interval History 2/11/2022:  Patient is seen for follow-up PM&R evaluation and recommendations: Participating w/ therapy.    HPI, Past Medical, Family, and Social History remains the same as documented in the initial encounter.    Scheduled Medications:    atorvastatin  80 mg Oral Daily    baclofen  10 mg Oral TID    famotidine  20 mg Oral BID    furosemide  80 mg Oral BID    lisinopriL  10 mg Oral Daily    lisinopriL  20 mg Oral QHS    magnesium oxide  400 mg Oral BID    polyethylene glycol  17 g Oral Daily    senna-docusate 8.6-50 mg  1 tablet Oral Daily    sodium chloride  1 g Oral Daily    warfarin  5 mg Oral Daily       Diagnostic Results: Labs: Reviewed    PRN Medications: sodium chloride, acetaminophen, dextrose 10%, glucagon (human recombinant), insulin aspart U-100, oxyCODONE-acetaminophen    Review of Systems   Constitutional: Positive for activity change.   Respiratory: Negative for shortness of breath.    Cardiovascular: Negative for chest pain.   Musculoskeletal: Positive for gait problem.   Neurological: Positive for weakness and headaches.   Psychiatric/Behavioral: Negative for confusion.     Objective:     Vital Signs (Most Recent):  Temp: 97.7 °F (36.5 °C) (02/11/22 0756)  Pulse: 110 (02/11/22 0642)  Resp: 18 (02/11/22 0756)  BP: 96/73 (02/11/22 0756)  SpO2: 100 % (02/11/22 0756)    Vital Signs (24h Range):  Temp:  [97.1 °F (36.2 °C)-98.5 °F (36.9 °C)] 97.7 °F (36.5 °C)  Pulse:  [] 110  Resp:  [16-18] 18  SpO2:  [95 %-100 %] 100 %  BP: ()/(0-73) 96/73     Physical Exam  Constitutional:       General: He is not in acute distress.     Appearance: He is well-developed.   HENT:      Head: Normocephalic and atraumatic.   Eyes:      General:         Right eye: No discharge.         Left eye: No discharge.   Cardiovascular:      Comments: LVAD   Pulmonary:      Effort: Pulmonary effort is normal. No respiratory distress.   Abdominal:      General: There is no distension.       Palpations: Abdomen is soft.   Musculoskeletal:         General: No deformity.      Cervical back: Neck supple.   Skin:     General: Skin is warm and dry.   Neurological:      Mental Status: He is alert and oriented to person, place, and time.      Comments: Follows commands   Generalized deconditioning  Latoya   Psychiatric:         Behavior: Behavior normal. Behavior is cooperative.         Cognition and Memory: Cognition is not impaired.       NEUROLOGICAL EXAMINATION:     MENTAL STATUS   Oriented to person, place, and time.

## 2022-02-11 NOTE — PROGRESS NOTES
02/11/2022  Micah Velez Jr    Current provider:  Pete Baker MD    Device interrogation:  TXP LVAD INTERROGATIONS 2/11/2022 2/11/2022 2/10/2022 2/10/2022 2/10/2022 2/10/2022 2/10/2022   Type HeartMate3 HeartMate3 HeartMate3 HeartMate3 HeartMate3 HeartMate3 HeartMate3   Flow 4 4.0 4.2 4.2 4.1 4.5 4.4   Speed 5000 5000 5000 5000 5000 5000 5000   PI 5.4 6.1 3.8 3.8 4.4 3.0 4.0   Power (Edwards) 3.3 3.5 3.4 3.5 3.3 3.3 3.4   LSL 4600 4600 4600 4600 4600 4600 4600   Pulsatility Pulse - - - Intermittent pulse Intermittent pulse Intermittent pulse          Rounded on Rocco France to ensure all mechanical assist device settings (IABP or VAD) were appropriate and all parameters were within limits.  I was able to ensure all back up equipment was present, the staff had no issues, and the Perfusion Department daily rounding was complete.      For implantable VADs: Interrogation of Ventricular assist device was performed with analysis of device parameters and review of device function. I have personally reviewed the interrogation findings and agree with findings as stated.     In emergency, the nursing units have been notified to contact the perfusion department either by:  Calling [id number] from 630am to 4pm Mon thru Fri, utilizing the On-Call Finder functionality of Epic and searching for Perfusion, or by contacting the hospital  from 4pm to 630am and on weekends and asking to speak with the perfusionist on call.    9:42 AM

## 2022-02-11 NOTE — PHYSICIAN QUERY
PT Name: Rocco France  MR #: [id number]     DOCUMENTATION CLARIFICATION     CDS/: Kinza Camara RN CDIS            Contact information: [email or web address]    This form is a permanent document in the medical record.     Query Date: February 11, 2022    By submitting this query, we are merely seeking further clarification of documentation.  Please utilize your independent clinical judgment when addressing the question(s) below.  The Medical Record contains the following   Indicators   Supporting Clinical Findings Location in Medical Record    SOB, GARCÍA, Wheezing, Productive Cough, Use of Accessory Muscles, etc.     x RR         ABGs         O2 sat Results for ROCCO FRANCE (MRN [id number]) as of 2/11/2022 12:02   Ref. Range 1/28/2022 03:41   POC PH Latest Ref Range: 7.35 - 7.45  7.442   POC PCO2 Latest Ref Range: 35 - 45 mmHg 38.4   POC PO2 Latest Ref Range: 80 - 100 mmHg 262 (H)   POC BE Latest Ref Range: -2 to 2 mmol/L 2   POC HCO3 Latest Ref Range: 24 - 28 mmol/L 26.2   POC SATURATED O2 Latest Ref Range: 95 - 100 % 100   POC TCO2 Latest Ref Range: 23 - 27 mmol/L 27   FiO2 Unknown 50   Vt Unknown 450   PiP Unknown 20   PEEP Unknown 8   Sample Unknown ARTERIAL   DelSys Unknown CPAP/BiPAP   Allens Test Unknown N/A   Site Unknown José Miguel/UAC   Mode Unknown AC/PRVC   Rate Unknown 18     Results for ROCCO FRANCE (MRN [id number]) as of 2/11/2022 12:02   Ref. Range 1/29/2022 05:18   POC PH Latest Ref Range: 7.35 - 7.45  7.455 (H)   POC PCO2 Latest Ref Range: 35 - 45 mmHg 36.9   POC PO2 Latest Ref Range: 80 - 100 mmHg 100   POC BE Latest Ref Range: -2 to 2 mmol/L 2   POC HCO3 Latest Ref Range: 24 - 28 mmol/L 26.0   POC SATURATED O2 Latest Ref Range: 95 - 100 % 98   POC TCO2 Latest Ref Range: 23 - 27 mmol/L 27   Sample Unknown ARTERIAL   DelSys Unknown Room Air   Allens Test Unknown N/A   Site Unknown José Miguel/UAC   Mode Unknown SPONT   Rate Unknown 10    Labs     Hypoxia/Hypercapnia       BiPAP/Intubation/Mechanical Ventilation      Supplemental O2      Home O2, Oxygen Dependence     x Respiratory distress or failure Acute respiratory failure requiring reintubation  -Intubated prior to transfer for respiratory distress  -Patient is currently on contact isolation for reported +ve Covid test at OSH prior to transfer. CXR unremarkable. Repeat COVID test positive  - Extubated 1/28 and on RA  - Tx'd with remdesivir which was since discontinued. Asymptomatic from covid perspective HTS note 1/31    Radiology findings      Acute/Chronic Illness      Treatment      Other         The noted clinical guidelines are only system guidelines and do not replace the providers clinical judgment.    Provider, please specify the diagnosis or diagnoses associated with above clinical findings.   [  x  ] Acute Respiratory Failure with Hypoxia - ABG pO2 < 60 mmHg or O2 sat of <91% on room air and respiratory symptoms documented   [    ] Acute Respiratory Failure with Hypercapnia - pCO2 > 50 mmHg with pH < 7.35 and respiratory symptoms documented   [    ] Acute Respiratory Failure with Hypoxia and Hypercapnia - Hypoxia: ABG pO2 < 60 mmHg or O2 sat of <91% on room air and Hypercapnia: pCO2 > 50 mmHg with pH < 7.35 and respiratory symptoms documented   [    ] Acute Respiratory Failure, unspecified whether with hypoxia or hypercapnia   [    ] Other Respiratory Diagnosis (please specify): _________________   [   ] Clinically Undetermined       Please document in your progress notes daily for the duration of treatment until resolved and include in your discharge summary.     Reference:    JACKELINE Penn MD. (2020, March 13). Acute respiratory distress syndrome: Clinical features, diagnosis, and complications in adults ([id number] RADHA Lantigua MD & [id number] BIMAL Valdivia MD, Eds.). Retrieved November 13, 2020, from  [email or web address]&source=search_result&selectedTitle=1~150&usage_type=default&display_rank=1  Form No. 64313

## 2022-02-11 NOTE — PROGRESS NOTES
Pt AAAO, no family at bedside. Pt working with therapists.  Will see pt a different time or tomorrow.

## 2022-02-11 NOTE — ASSESSMENT & PLAN NOTE
-S/P DT HM3 1/13/21  -Current speed 5000  -INR goal 2.0-3.0. Current INR subtherapeutic. Coumadin restarted as above.  -ASA stopped.   -LDH stable    Procedure: Device Interrogation Including analysis of device parameters  Current Settings: Ventricular Assist Device  Review of device function is stable/unstable stable         TXP LVAD INTERROGATIONS 2/11/2022 2/11/2022 2/10/2022 2/10/2022 2/10/2022 2/10/2022 2/10/2022   Type HeartMate3 HeartMate3 HeartMate3 HeartMate3 HeartMate3 HeartMate3 HeartMate3   Flow 4 4.0 4.2 4.2 4.1 4.5 4.4   Speed 5000 5000 5000 5000 5000 5000 5000   PI 5.4 6.1 3.8 3.8 4.4 3.0 4.0   Power (Edwards) 3.3 3.5 3.4 3.5 3.3 3.3 3.4   LSL 4600 4600 4600 4600 4600 4600 4600   Pulsatility Pulse - - - Intermittent pulse Intermittent pulse Intermittent pulse

## 2022-02-11 NOTE — PROGRESS NOTES
Discharge Planning    Faxed demographic sheet, orders, H&P, progress notes and MAR to [facility] [phone], fax [phone] requesting authorization for [facility]  [phone], fax [phone]. All of the above also faxed to  so they are aware. Providing ongoing psychosocial and counseling support, education, resources, assistance and discharge planning as indicated. Following and available.    Addendum: Received call from N stating pt is authorized to start with [facility] on Tuesday 2/15.

## 2022-02-11 NOTE — PROGRESS NOTES
[facility]iology Step[facility]  Heart Transplant  Progress Note    Patient Name: Rocco France  MRN: [id number]  Admission Date: 1/27/2022  Hospital Length of Stay: 15 days  Attending Physician: Pete Baker MD  Primary Care Provider: Teresa Mendoza NP  Principal Problem:Bilateral subdural hematomas    Subjective:     Interval History: CT scan done with 4/10 HA yesterday, slight resolution.     Continuous Infusions:   sodium chloride 0.9% 5 mL/hr (02/08/22 0914)     Scheduled Meds:   atorvastatin  80 mg Oral Daily    baclofen  10 mg Oral TID    famotidine  20 mg Oral BID    furosemide  80 mg Oral BID    lisinopriL  10 mg Oral Daily    lisinopriL  20 mg Oral QHS    magnesium oxide  400 mg Oral BID    polyethylene glycol  17 g Oral Daily    senna-docusate 8.6-50 mg  1 tablet Oral Daily    sodium chloride  1 g Oral Daily    warfarin  5 mg Oral Daily     PRN Meds:sodium chloride, acetaminophen, dextrose 10%, glucagon (human recombinant), insulin aspart U-100, oxyCODONE-acetaminophen    Review of patient's allergies indicates:   Allergen Reactions    Pcn [penicillins] Hives     Objective:     Vital Signs (Most Recent):  Temp: 97.7 °F (36.5 °C) (02/11/22 0756)  Pulse: 110 (02/11/22 0642)  Resp: 18 (02/11/22 0756)  BP: 96/73 (02/11/22 0756)  SpO2: 100 % (02/11/22 0756) Vital Signs (24h Range):  Temp:  [97.1 °F (36.2 °C)-98.5 °F (36.9 °C)] 97.7 °F (36.5 °C)  Pulse:  [] 110  Resp:  [16-18] 18  SpO2:  [95 %-100 %] 100 %  BP: ()/(0-73) 96/73     Patient Vitals for the past 72 hrs (Last 3 readings):   Weight   02/10/22 0600 78.3 kg (172 lb 9.9 oz)   02/09/22 0300 84.7 kg (186 lb 11.7 oz)     Body mass index is 22.16 kg/m².      Intake/Output Summary (Last 24 hours) at 2/11/2022 0859  Last data filed at 2/10/2022 2349  Gross per 24 hour   Intake 540 ml   Output 1125 ml   Net -585 ml       Hemodynamic Parameters:         Physical Exam  Constitutional:       General: He is not in acute  distress.     Appearance: He is well-developed.   HENT:      Head: Normocephalic and atraumatic.   Eyes:      General:         Right eye: No discharge.         Left eye: No discharge.   Cardiovascular:      Comments: LVAD and a-line in place  Pulmonary:      Effort: Pulmonary effort is normal. No respiratory distress.   Abdominal:      General: There is no distension.      Palpations: Abdomen is soft.   Musculoskeletal:         General: No deformity.      Cervical back: Neck supple.   Skin:     General: Skin is warm and dry.   Neurological:      Mental Status: He is alert and oriented to person, place, and time.      Comments: Follows commands   Generalized deconditioning  Latoya   Psychiatric:         Behavior: Behavior normal. Behavior is cooperative.         Cognition and Memory: Cognition is not impaired.         Significant Labs:  CBC:  Recent Labs   Lab 02/09/22  0302 02/10/22  0517 02/11/22  0437   WBC 8.21 8.25  8.25 8.92  8.92   RBC 3.18* 3.21*  3.21* 3.29*  3.29*   HGB 9.1* 9.0*  9.0* 9.5*  9.5*   HCT 29.3* 28.5*  28.5* 30.7*  30.7*    189  189 179  179   MCV 92 89  89 93  93   MCH 28.6 28.0  28.0 28.9  28.9   MCHC 31.1* 31.6*  31.6* 30.9*  30.9*     BNP:  Recent Labs   Lab 02/07/22  0232 02/09/22  0302 02/11/22  0437   * 258* 302*     CMP:  Recent Labs   Lab 02/07/22  0232 02/08/22  0444 02/09/22  0302 02/09/22  0303 02/10/22  0517 02/11/22  0437   *   < >  --  146* 143*  143* 152*  152*   CALCIUM 8.4*   < >  --  8.5* 8.6*  8.6* 9.0  9.0   ALBUMIN 2.3*  --  2.5*  --   --  2.6*  2.6*   PROT 6.6  --  7.1  --   --  7.7  7.7      < >  --  135* 131*  131* 134*  134*   K 4.0   < >  --  4.0 3.9  3.9 3.9  3.9   CO2 24   < >  --  26 27  27 25  25      < >  --  101 99  99 99  99   BUN 17   < >  --  17 18  18 23  23   CREATININE 0.7   < >  --  0.8 0.8  0.8 0.8  0.8   ALKPHOS 119  --  125  --   --  137*  137*   ALT 7*  --  <5*  --   --  8*  8*   AST  13  --  12  --   --  15  15   BILITOT 1.1*  --  1.3*  --   --  1.4*  1.4*    < > = values in this interval not displayed.      Coagulation:   Recent Labs   Lab 02/09/22  0302 02/10/22  0517 02/11/22  0437   INR 1.2 1.2  1.2 1.2  1.2   APTT 28.5 29.7  29.7 28.6  28.6     LDH:  Recent Labs   Lab 02/09/22  0302 02/10/22  0517 02/11/22  0437    171 196     Microbiology:  Microbiology Results (last 7 days)     ** No results found for the last 168 hours. **          I have reviewed all pertinent labs within the past 24 hours.    Estimated Creatinine Clearance: 100.6 mL/min (based on SCr of 0.8 mg/dL).    Diagnostic Results:  I have reviewed and interpreted all pertinent imaging results/findings within the past 24 hours.    Assessment and Plan:     65 yo BM with stage D CHF due to NICMP s/p HM3, s/p ICD, HTN, HLD, T2DM was transferred from outside hospital emergency room after he was found to have subdural hematoma/subarachnoid hemorrhage.  Patient presented to the ER after he was found to have a syncopal event.  Also patient had respiratory distress on arrival for which he was intubated.  Patient was transferred here for further evaluation.  On arrival patient is intubated and is on propofol.  Neurosurgery and Neuro Critical Care were immediately informed.  Neurosurgery recommended to repeat CT after reviewing the prior CT done in the ER in [place].  INR on arrival is 1.6.  He also had a low flow with a increased PI around 4:00 p.m. this evening.  According to wife patient was complaining of headache for the last 2 weeks..  He went to post office this afternoon and she does not know what exactly happened..  ICD was interrogated and did not show any VT/VF.       * Bilateral subdural hematomas  -Patient had a traumatic subdural, subarachnoid hemorrhage after fall on 1/27. Currently no focal deficits  -NCC signed off. NSGY following.   -INR subtherapeutic. Coumadin restarted 2/5 at low dose with plans to watch  INR rise slowly. PharmD to adjust dosing  -Goal Na+ > 135, slowly trickled down days after stopping hypertonic saline. Salt tabs given prn. Will monitor for improvement with increased diuresis.  -Keppra started as AED. EEG negative for seizures.  -Transcranial dopplers done 1/31 without evidence of vasospasm.  -Continue q1h neurochecks.    Acute respiratory failure requiring reintubation  - Extubated 1/28 and on RA  - Tx'd with remdesivir which was since discontinued. Asymptomatic from covid perspective    LVAD (left ventricular assist device) present  -S/P DT HM3 1/13/21  -Current speed 5000  -INR goal 2.0-3.0. Current INR subtherapeutic. Coumadin restarted as above.  -ASA stopped.   -LDH stable    Procedure: Device Interrogation Including analysis of device parameters  Current Settings: Ventricular Assist Device  Review of device function is stable/unstable stable         TXP LVAD INTERROGATIONS 2/11/2022 2/11/2022 2/10/2022 2/10/2022 2/10/2022 2/10/2022 2/10/2022   Type HeartMate3 HeartMate3 HeartMate3 HeartMate3 HeartMate3 HeartMate3 HeartMate3   Flow 4 4.0 4.2 4.2 4.1 4.5 4.4   Speed 5000 5000 5000 5000 5000 5000 5000   PI 5.4 6.1 3.8 3.8 4.4 3.0 4.0   Power (Edwards) 3.3 3.5 3.4 3.5 3.3 3.3 3.4   LSL 4600 4600 4600 4600 4600 4600 4600   Pulsatility Pulse - - - Intermittent pulse Intermittent pulse Intermittent pulse       Type 2 diabetes mellitus without complication  -SSI  ACHS     Essential hypertension  -Levophed off. MAP goal >65  -Continue Lisinopril 10qam/20qhs,  doxazosin 2mg BID initiated but discontinued yesterday as BP controlled with adjust lisinopril dosing. Will monitor closely and reinitiate if elevated Bps return        CT Fam  Heart Transplant  [facility] - [facility]

## 2022-02-11 NOTE — PROGRESS NOTES
[facility] - Cardiology Stepdown  Physical Medicine & Rehab  Progress Note    Patient Name: Rocco France  MRN: [id number]  Admission Date: 1/27/2022  Length of Stay: 15 days  Attending Physician: Pete Baker MD    Subjective:     Principal Problem:Bilateral subdural hematomas    Hospital Course:   2.7: PT-BM and sit to stand CGA. Ambulated 6 ft fwd/bwd Min.   02/08/2022: OT-Bed mobility SBA. Static sitting EOB SBS with mild dizziness. Sit to stand and transfers CGA.  Ambulated 8 FT CGA.  UBD/toilet CGA and F/G Mod (I).   2/9: No therapy.   2/10: BM SBA. Sit to stand CGA. Ambulated 22 ft Min.     Interval History 2/11/2022:  Patient is seen for follow-up PM&R evaluation and recommendations: Participating w/ therapy.    HPI, Past Medical, Family, and Social History remains the same as documented in the initial encounter.    Scheduled Medications:    atorvastatin  80 mg Oral Daily    baclofen  10 mg Oral TID    famotidine  20 mg Oral BID    furosemide  80 mg Oral BID    lisinopriL  10 mg Oral Daily    lisinopriL  20 mg Oral QHS    magnesium oxide  400 mg Oral BID    polyethylene glycol  17 g Oral Daily    senna-docusate 8.6-50 mg  1 tablet Oral Daily    sodium chloride  1 g Oral Daily    warfarin  5 mg Oral Daily       Diagnostic Results: Labs: Reviewed    PRN Medications: sodium chloride, acetaminophen, dextrose 10%, glucagon (human recombinant), insulin aspart U-100, oxyCODONE-acetaminophen    Review of Systems   Constitutional: Positive for activity change.   Respiratory: Negative for shortness of breath.    Cardiovascular: Negative for chest pain.   Musculoskeletal: Positive for gait problem.   Neurological: Positive for weakness and headaches.   Psychiatric/Behavioral: Negative for confusion.     Objective:     Vital Signs (Most Recent):  Temp: 97.7 °F (36.5 °C) (02/11/22 0756)  Pulse: 110 (02/11/22 0642)  Resp: 18 (02/11/22 0756)  BP: 96/73 (02/11/22 0756)  SpO2: 100 % (02/11/22 0756)     Vital Signs (24h Range):  Temp:  [97.1 °F (36.2 °C)-98.5 °F (36.9 °C)] 97.7 °F (36.5 °C)  Pulse:  [] 110  Resp:  [16-18] 18  SpO2:  [95 %-100 %] 100 %  BP: ()/(0-73) 96/73     Physical Exam  Constitutional:       General: He is not in acute distress.     Appearance: He is well-developed.   HENT:      Head: Normocephalic and atraumatic.   Eyes:      General:         Right eye: No discharge.         Left eye: No discharge.   Cardiovascular:      Comments: LVAD   Pulmonary:      Effort: Pulmonary effort is normal. No respiratory distress.   Abdominal:      General: There is no distension.      Palpations: Abdomen is soft.   Musculoskeletal:         General: No deformity.      Cervical back: Neck supple.   Skin:     General: Skin is warm and dry.   Neurological:      Mental Status: He is alert and oriented to person, place, and time.      Comments: Follows commands   Generalized deconditioning  Latoya   Psychiatric:         Behavior: Behavior normal. Behavior is cooperative.         Cognition and Memory: Cognition is not impaired.     Assessment/Plan:      * Bilateral subdural hematomas  -Repeat imaging revealed CTA with new cisternal SAH, negative aneurysm or vascular malformation on CTA, likely nonaneurysmal perimesencephalic SAH per NSGY  - Repeat CHT 2/7 stable  -repeat CTH on 2/10 with slight resolution per HTS    See hospital course for functional, cognitive/speech/language, and nutrition/swallow status.      Recommendations  -  Monitor sleep disturbances and establish consistent sleep-wake cycle  -  Environmental modifications to limit agitation/confusion   -  Reorient patient to person, place, time, and situation on each encounter  -  Avoid restraints  -  May benefit from 24/7 supervision  -  Avoid/limit medications that can worsen delirium (benzodiazepines, antihistamines, anticholinergics, hypnotics, opiates)  -  Encourage mobility, OOB in chair, and early ambulation as appropriate  -  PT/OT evaluate  and treat  -  SLP speech and cognitive evaluate and treat  -  Monitor for bowel and bladder dysfunction  -  Monitor for and prevent skin breakdown and pressure ulcers  · Early mobility, repositioning/weight shifting every 20-30 minutes when sitting, turn patient every 2 hours, proper mattress/overlay and chair cushioning, pressure relief/heel protector boots    Acute respiratory failure requiring reintubation  -extubated on 1/28, now on RA    LVAD (left ventricular assist device) present  -s/p LVAD 1/13/21  -INR goal 2.0-3.0  -INR subtherapeutic, primary team managing     Participating with therapy and requesting to go home w/ HH therapy. Not medically stable.  Will follow progress for post acute care recommendation.       America Ortega NP  Department of Physical Medicine & Rehab   [facility]

## 2022-02-11 NOTE — SUBJECTIVE & OBJECTIVE
Interval History: CT scan done with 4/10 HA yesterday, slight resolution.     Continuous Infusions:   sodium chloride 0.9% 5 mL/hr (02/08/22 0914)     Scheduled Meds:   atorvastatin  80 mg Oral Daily    baclofen  10 mg Oral TID    famotidine  20 mg Oral BID    furosemide  80 mg Oral BID    lisinopriL  10 mg Oral Daily    lisinopriL  20 mg Oral QHS    magnesium oxide  400 mg Oral BID    polyethylene glycol  17 g Oral Daily    senna-docusate 8.6-50 mg  1 tablet Oral Daily    sodium chloride  1 g Oral Daily    warfarin  5 mg Oral Daily     PRN Meds:sodium chloride, acetaminophen, dextrose 10%, glucagon (human recombinant), insulin aspart U-100, oxyCODONE-acetaminophen    Review of patient's allergies indicates:   Allergen Reactions    Pcn [penicillins] Hives     Objective:     Vital Signs (Most Recent):  Temp: 97.7 °F (36.5 °C) (02/11/22 0756)  Pulse: 110 (02/11/22 0642)  Resp: 18 (02/11/22 0756)  BP: 96/73 (02/11/22 0756)  SpO2: 100 % (02/11/22 0756) Vital Signs (24h Range):  Temp:  [97.1 °F (36.2 °C)-98.5 °F (36.9 °C)] 97.7 °F (36.5 °C)  Pulse:  [] 110  Resp:  [16-18] 18  SpO2:  [95 %-100 %] 100 %  BP: ()/(0-73) 96/73     Patient Vitals for the past 72 hrs (Last 3 readings):   Weight   02/10/22 0600 78.3 kg (172 lb 9.9 oz)   02/09/22 0300 84.7 kg (186 lb 11.7 oz)     Body mass index is 22.16 kg/m².      Intake/Output Summary (Last 24 hours) at 2/11/2022 0859  Last data filed at 2/10/2022 2349  Gross per 24 hour   Intake 540 ml   Output 1125 ml   Net -585 ml       Hemodynamic Parameters:         Physical Exam  Constitutional:       General: He is not in acute distress.     Appearance: He is well-developed.   HENT:      Head: Normocephalic and atraumatic.   Eyes:      General:         Right eye: No discharge.         Left eye: No discharge.   Cardiovascular:      Comments: LVAD and a-line in place  Pulmonary:      Effort: Pulmonary effort is normal. No respiratory distress.   Abdominal:       General: There is no distension.      Palpations: Abdomen is soft.   Musculoskeletal:         General: No deformity.      Cervical back: Neck supple.   Skin:     General: Skin is warm and dry.   Neurological:      Mental Status: He is alert and oriented to person, place, and time.      Comments: Follows commands   Generalized deconditioning  Latoya   Psychiatric:         Behavior: Behavior normal. Behavior is cooperative.         Cognition and Memory: Cognition is not impaired.         Significant Labs:  CBC:  Recent Labs   Lab 02/09/22  0302 02/10/22  0517 02/11/22  0437   WBC 8.21 8.25  8.25 8.92  8.92   RBC 3.18* 3.21*  3.21* 3.29*  3.29*   HGB 9.1* 9.0*  9.0* 9.5*  9.5*   HCT 29.3* 28.5*  28.5* 30.7*  30.7*    189  189 179  179   MCV 92 89  89 93  93   MCH 28.6 28.0  28.0 28.9  28.9   MCHC 31.1* 31.6*  31.6* 30.9*  30.9*     BNP:  Recent Labs   Lab 02/07/22 0232 02/09/22 0302 02/11/22  0437   * 258* 302*     CMP:  Recent Labs   Lab 02/07/22 0232 02/08/22 0444 02/09/22  0302 02/09/22  0303 02/10/22  0517 02/11/22  0437   *   < >  --  146* 143*  143* 152*  152*   CALCIUM 8.4*   < >  --  8.5* 8.6*  8.6* 9.0  9.0   ALBUMIN 2.3*  --  2.5*  --   --  2.6*  2.6*   PROT 6.6  --  7.1  --   --  7.7  7.7      < >  --  135* 131*  131* 134*  134*   K 4.0   < >  --  4.0 3.9  3.9 3.9  3.9   CO2 24   < >  --  26 27  27 25  25      < >  --  101 99  99 99  99   BUN 17   < >  --  17 18  18 23  23   CREATININE 0.7   < >  --  0.8 0.8  0.8 0.8  0.8   ALKPHOS 119  --  125  --   --  137*  137*   ALT 7*  --  <5*  --   --  8*  8*   AST 13  --  12  --   --  15  15   BILITOT 1.1*  --  1.3*  --   --  1.4*  1.4*    < > = values in this interval not displayed.      Coagulation:   Recent Labs   Lab 02/09/22  0302 02/10/22  0517 02/11/22  0437   INR 1.2 1.2  1.2 1.2  1.2   APTT 28.5 29.7  29.7 28.6  28.6     LDH:  Recent Labs   Lab 02/09/22  0302 02/10/22  0517  02/11/22  0437    171 196     Microbiology:  Microbiology Results (last 7 days)     ** No results found for the last 168 hours. **          I have reviewed all pertinent labs within the past 24 hours.    Estimated Creatinine Clearance: 100.6 mL/min (based on SCr of 0.8 mg/dL).    Diagnostic Results:  I have reviewed and interpreted all pertinent imaging results/findings within the past 24 hours.

## 2022-02-11 NOTE — PROGRESS NOTES
Pt AAAO, no family at bedside.  Reviewed VAD education with pt to ensure he is safe to go home once appropriate.  Pt continues to be competent with his VAD.  He remembers alarms, how to contact us, performing his self test and more. All questions answered to pt satisfaction as evidence by verbal acknowledgement.

## 2022-02-11 NOTE — PLAN OF CARE
Plan of care discussed with pt. VSS. A&Ox4; x1 assist. Denies c/o of CP or SOB. Telemetry monitor showing ST. LVAD dressing change due 2/11/22. No VAD alarms overnight. Pt remains free of injury or falls. All questions addressed.

## 2022-02-11 NOTE — ASSESSMENT & PLAN NOTE
-Repeat imaging revealed CTA with new cisternal SAH, negative aneurysm or vascular malformation on CTA, likely nonaneurysmal perimesencephalic SAH per NSGY  - Repeat CHT 2/7 stable  -repeat CTH on 2/10 with slight resolution per HTS    See hospital course for functional, cognitive/speech/language, and nutrition/swallow status.      Recommendations  -  Monitor sleep disturbances and establish consistent sleep-wake cycle  -  Environmental modifications to limit agitation/confusion   -  Reorient patient to person, place, time, and situation on each encounter  -  Avoid restraints  -  May benefit from 24/7 supervision  -  Avoid/limit medications that can worsen delirium (benzodiazepines, antihistamines, anticholinergics, hypnotics, opiates)  -  Encourage mobility, OOB in chair, and early ambulation as appropriate  -  PT/OT evaluate and treat  -  SLP speech and cognitive evaluate and treat  -  Monitor for bowel and bladder dysfunction  -  Monitor for and prevent skin breakdown and pressure ulcers  · Early mobility, repositioning/weight shifting every 20-30 minutes when sitting, turn patient every 2 hours, proper mattress/overlay and chair cushioning, pressure relief/heel protector boots

## 2022-02-11 NOTE — PLAN OF CARE
[facility]   Heart Transplant/VAD Clinic   [address] [place] [address]   [phone] after hours          [phone] fax     VAD HOME  HEALTH ORDERS      Admit to Home Health    Diagnosis:   Patient Active Problem List   Diagnosis    AICD (automatic cardioverter/defibrillator) present    NICM (nonischemic cardiomyopathy)    Leg pain, left    Essential hypertension    Type 2 diabetes mellitus without complication    Hyperlipidemia    Chronic combined systolic and diastolic congestive heart failure    CAD (coronary artery disease)    Counseling regarding advanced care planning and goals of care    LVAD (left ventricular assist device) present    Acute blood loss anemia    Anticoagulated    Bilateral subdural hematomas    Subarachnoid bleed    Acute respiratory failure requiring reintubation    PSVT (paroxysmal supraventricular tachycardia)    Tachycardia    Acute on chronic combined systolic (congestive) and diastolic (congestive) heart failure    Subarachnoid hemorrhage       Patient is homebound due to:  NYHA Class IV HF. S/P LVAD placement.     Diet: Low Fat, Low cholesterol, 2Gm Na, Coumadin restrictions.    Acitivities: No Swimming, bathing, vacuuming, contact sports.    Fresh implants= Sternal Precautions    Nursing:   SN to complete comprehensive assessment including routine vital signs. Instruct on disease process and s/s of complications to report to MD. Review/verify medication list sent home with the patient at time of discharge  and instruct patient/caregiver as needed. Frequency may be adjusted depending on start of care date.    **LVAD driveline exit site dressing change is to be completed per LVAD patient/caregiver only**.    Notify MD if:  SBP > 120 or < 80;   MAP > 80 or < 65;   HR > 120 or < 60;   Temp > 101;   Weight gain >3lbs in 1 day or 5lbs in 1 week.    LABS:  SN to perform labs: PT/INR per Coumadin clinic  [phone].   Follow up INR date: 2/17/2022  No Finger Sticks        CONSULTS:      Physical Therapy to evaluate and treat. Evaluate for home safety and equipment needs; Establish/upgrade home exercise program. Perform / instruct on therapeutic exercises, gait training, transfer training, and Range of Motion.    Occupational Therapy to evaluate and treat. Evaluate home environment for safety and equipment needs. Perform/Instruct on transfers, ADL training, ROM, and therapeutic exercises.        to evaluate for community resources/long-range planning.     Aide to provide assistance with personal care, ADLs, and vital signs    Send initial Home Health orders to HTS attending physician on call.  Send follow up questions to VAD clinic MD [phone] or [phone].

## 2022-02-12 LAB
ANION GAP SERPL CALC-SCNC: 10 MMOL/L (ref 8–16)
ANION GAP SERPL CALC-SCNC: 10 MMOL/L (ref 8–16)
APTT BLDCRRT: 30 SEC (ref 21–32)
APTT BLDCRRT: 30 SEC (ref 21–32)
BASOPHILS # BLD AUTO: 0.03 K/UL (ref 0–0.2)
BASOPHILS # BLD AUTO: 0.03 K/UL (ref 0–0.2)
BASOPHILS NFR BLD: 0.3 % (ref 0–1.9)
BASOPHILS NFR BLD: 0.3 % (ref 0–1.9)
BUN SERPL-MCNC: 21 MG/DL (ref 8–23)
BUN SERPL-MCNC: 21 MG/DL (ref 8–23)
CALCIUM SERPL-MCNC: 9.4 MG/DL (ref 8.7–10.5)
CALCIUM SERPL-MCNC: 9.4 MG/DL (ref 8.7–10.5)
CHLORIDE SERPL-SCNC: 100 MMOL/L (ref 95–110)
CHLORIDE SERPL-SCNC: 100 MMOL/L (ref 95–110)
CO2 SERPL-SCNC: 26 MMOL/L (ref 23–29)
CO2 SERPL-SCNC: 26 MMOL/L (ref 23–29)
CREAT SERPL-MCNC: 0.7 MG/DL (ref 0.5–1.4)
CREAT SERPL-MCNC: 0.7 MG/DL (ref 0.5–1.4)
DIFFERENTIAL METHOD: ABNORMAL
DIFFERENTIAL METHOD: ABNORMAL
EOSINOPHIL # BLD AUTO: 0.1 K/UL (ref 0–0.5)
EOSINOPHIL # BLD AUTO: 0.1 K/UL (ref 0–0.5)
EOSINOPHIL NFR BLD: 1.6 % (ref 0–8)
EOSINOPHIL NFR BLD: 1.6 % (ref 0–8)
ERYTHROCYTE [DISTWIDTH] IN BLOOD BY AUTOMATED COUNT: 15.1 % (ref 11.5–14.5)
ERYTHROCYTE [DISTWIDTH] IN BLOOD BY AUTOMATED COUNT: 15.1 % (ref 11.5–14.5)
EST. GFR  (AFRICAN AMERICAN): >60 ML/MIN/1.73 M^2
EST. GFR  (AFRICAN AMERICAN): >60 ML/MIN/1.73 M^2
EST. GFR  (NON AFRICAN AMERICAN): >60 ML/MIN/1.73 M^2
EST. GFR  (NON AFRICAN AMERICAN): >60 ML/MIN/1.73 M^2
GLUCOSE SERPL-MCNC: 111 MG/DL (ref 70–110)
GLUCOSE SERPL-MCNC: 111 MG/DL (ref 70–110)
HCT VFR BLD AUTO: 30.9 % (ref 40–54)
HCT VFR BLD AUTO: 30.9 % (ref 40–54)
HGB BLD-MCNC: 9.6 G/DL (ref 14–18)
HGB BLD-MCNC: 9.6 G/DL (ref 14–18)
IMM GRANULOCYTES # BLD AUTO: 0.02 K/UL (ref 0–0.04)
IMM GRANULOCYTES # BLD AUTO: 0.02 K/UL (ref 0–0.04)
IMM GRANULOCYTES NFR BLD AUTO: 0.2 % (ref 0–0.5)
IMM GRANULOCYTES NFR BLD AUTO: 0.2 % (ref 0–0.5)
INR PPP: 1.2 (ref 0.8–1.2)
INR PPP: 1.2 (ref 0.8–1.2)
LDH SERPL L TO P-CCNC: 235 U/L (ref 110–260)
LYMPHOCYTES # BLD AUTO: 2 K/UL (ref 1–4.8)
LYMPHOCYTES # BLD AUTO: 2 K/UL (ref 1–4.8)
LYMPHOCYTES NFR BLD: 22.7 % (ref 18–48)
LYMPHOCYTES NFR BLD: 22.7 % (ref 18–48)
MAGNESIUM SERPL-MCNC: 1.8 MG/DL (ref 1.6–2.6)
MAGNESIUM SERPL-MCNC: 1.8 MG/DL (ref 1.6–2.6)
MCH RBC QN AUTO: 28 PG (ref 27–31)
MCH RBC QN AUTO: 28 PG (ref 27–31)
MCHC RBC AUTO-ENTMCNC: 31.1 G/DL (ref 32–36)
MCHC RBC AUTO-ENTMCNC: 31.1 G/DL (ref 32–36)
MCV RBC AUTO: 90 FL (ref 82–98)
MCV RBC AUTO: 90 FL (ref 82–98)
MONOCYTES # BLD AUTO: 0.8 K/UL (ref 0.3–1)
MONOCYTES # BLD AUTO: 0.8 K/UL (ref 0.3–1)
MONOCYTES NFR BLD: 8.7 % (ref 4–15)
MONOCYTES NFR BLD: 8.7 % (ref 4–15)
NEUTROPHILS # BLD AUTO: 5.9 K/UL (ref 1.8–7.7)
NEUTROPHILS # BLD AUTO: 5.9 K/UL (ref 1.8–7.7)
NEUTROPHILS NFR BLD: 66.5 % (ref 38–73)
NEUTROPHILS NFR BLD: 66.5 % (ref 38–73)
NRBC BLD-RTO: 0 /100 WBC
NRBC BLD-RTO: 0 /100 WBC
PHOSPHATE SERPL-MCNC: 2.7 MG/DL (ref 2.7–4.5)
PHOSPHATE SERPL-MCNC: 2.7 MG/DL (ref 2.7–4.5)
PLATELET # BLD AUTO: 193 K/UL (ref 150–450)
PLATELET # BLD AUTO: 193 K/UL (ref 150–450)
PMV BLD AUTO: 11.7 FL (ref 9.2–12.9)
PMV BLD AUTO: 11.7 FL (ref 9.2–12.9)
POCT GLUCOSE: 132 MG/DL (ref 70–110)
POCT GLUCOSE: 151 MG/DL (ref 70–110)
POCT GLUCOSE: 177 MG/DL (ref 70–110)
POCT GLUCOSE: 210 MG/DL (ref 70–110)
POTASSIUM SERPL-SCNC: 4.1 MMOL/L (ref 3.5–5.1)
POTASSIUM SERPL-SCNC: 4.1 MMOL/L (ref 3.5–5.1)
PROTHROMBIN TIME: 12.3 SEC (ref 9–12.5)
PROTHROMBIN TIME: 12.3 SEC (ref 9–12.5)
RBC # BLD AUTO: 3.43 M/UL (ref 4.6–6.2)
RBC # BLD AUTO: 3.43 M/UL (ref 4.6–6.2)
SODIUM SERPL-SCNC: 136 MMOL/L (ref 136–145)
SODIUM SERPL-SCNC: 136 MMOL/L (ref 136–145)
WBC # BLD AUTO: 8.81 K/UL (ref 3.9–12.7)
WBC # BLD AUTO: 8.81 K/UL (ref 3.9–12.7)

## 2022-02-12 PROCEDURE — 93750 PR INTERROGATE VENT ASSIST DEV, IN PERSON, W PHYSICIAN ANALYSIS: ICD-10-PCS | Mod: ,,, | Performed by: INTERNAL MEDICINE

## 2022-02-12 PROCEDURE — 99233 SBSQ HOSP IP/OBS HIGH 50: CPT | Mod: ,,, | Performed by: PHYSICIAN ASSISTANT

## 2022-02-12 PROCEDURE — 36415 COLL VENOUS BLD VENIPUNCTURE: CPT | Performed by: HOSPITALIST

## 2022-02-12 PROCEDURE — 94761 N-INVAS EAR/PLS OXIMETRY MLT: CPT

## 2022-02-12 PROCEDURE — 25000003 PHARM REV CODE 250: Performed by: STUDENT IN AN ORGANIZED HEALTH CARE EDUCATION/TRAINING PROGRAM

## 2022-02-12 PROCEDURE — 84100 ASSAY OF PHOSPHORUS: CPT | Performed by: HOSPITALIST

## 2022-02-12 PROCEDURE — 83735 ASSAY OF MAGNESIUM: CPT | Performed by: HOSPITALIST

## 2022-02-12 PROCEDURE — 85610 PROTHROMBIN TIME: CPT | Performed by: HOSPITALIST

## 2022-02-12 PROCEDURE — 25000003 PHARM REV CODE 250: Performed by: INTERNAL MEDICINE

## 2022-02-12 PROCEDURE — 85025 COMPLETE CBC W/AUTO DIFF WBC: CPT | Performed by: HOSPITALIST

## 2022-02-12 PROCEDURE — 27000248 HC VAD-ADDITIONAL DAY

## 2022-02-12 PROCEDURE — 25000003 PHARM REV CODE 250: Performed by: HOSPITALIST

## 2022-02-12 PROCEDURE — 20600001 HC STEP DOWN PRIVATE ROOM

## 2022-02-12 PROCEDURE — 25000003 PHARM REV CODE 250: Performed by: PHYSICIAN ASSISTANT

## 2022-02-12 PROCEDURE — 83615 LACTATE (LD) (LDH) ENZYME: CPT | Performed by: HOSPITALIST

## 2022-02-12 PROCEDURE — 99233 PR SUBSEQUENT HOSPITAL CARE,LEVL III: ICD-10-PCS | Mod: ,,, | Performed by: PHYSICIAN ASSISTANT

## 2022-02-12 PROCEDURE — 80048 BASIC METABOLIC PNL TOTAL CA: CPT | Performed by: HOSPITALIST

## 2022-02-12 PROCEDURE — 93750 INTERROGATION VAD IN PERSON: CPT | Mod: ,,, | Performed by: INTERNAL MEDICINE

## 2022-02-12 PROCEDURE — 85730 THROMBOPLASTIN TIME PARTIAL: CPT | Performed by: HOSPITALIST

## 2022-02-12 RX ORDER — POTASSIUM CHLORIDE 20 MEQ/1
40 TABLET, EXTENDED RELEASE ORAL ONCE
Status: COMPLETED | OUTPATIENT
Start: 2022-02-12 | End: 2022-02-12

## 2022-02-12 RX ORDER — WARFARIN 7.5 MG/1
7.5 TABLET ORAL ONCE
Status: COMPLETED | OUTPATIENT
Start: 2022-02-12 | End: 2022-02-12

## 2022-02-12 RX ORDER — WARFARIN SODIUM 5 MG/1
5 TABLET ORAL DAILY
Status: DISCONTINUED | OUTPATIENT
Start: 2022-02-13 | End: 2022-02-13

## 2022-02-12 RX ORDER — LANOLIN ALCOHOL/MO/W.PET/CERES
400 CREAM (GRAM) TOPICAL ONCE
Status: COMPLETED | OUTPATIENT
Start: 2022-02-12 | End: 2022-02-12

## 2022-02-12 RX ADMIN — BACLOFEN 10 MG: 10 TABLET ORAL at 09:02

## 2022-02-12 RX ADMIN — Medication 400 MG: at 08:02

## 2022-02-12 RX ADMIN — SENNOSIDES AND DOCUSATE SODIUM 1 TABLET: 50; 8.6 TABLET ORAL at 09:02

## 2022-02-12 RX ADMIN — FUROSEMIDE 80 MG: 80 TABLET ORAL at 07:02

## 2022-02-12 RX ADMIN — POTASSIUM CHLORIDE 40 MEQ: 1500 TABLET, EXTENDED RELEASE ORAL at 03:02

## 2022-02-12 RX ADMIN — POLYETHYLENE GLYCOL 3350 17 G: 17 POWDER, FOR SOLUTION ORAL at 09:02

## 2022-02-12 RX ADMIN — BACLOFEN 10 MG: 10 TABLET ORAL at 08:02

## 2022-02-12 RX ADMIN — FAMOTIDINE 20 MG: 20 TABLET ORAL at 09:02

## 2022-02-12 RX ADMIN — Medication 1 G: at 09:02

## 2022-02-12 RX ADMIN — FAMOTIDINE 20 MG: 20 TABLET ORAL at 08:02

## 2022-02-12 RX ADMIN — OXYCODONE HYDROCHLORIDE AND ACETAMINOPHEN 1 TABLET: 5; 325 TABLET ORAL at 02:02

## 2022-02-12 RX ADMIN — LISINOPRIL 20 MG: 20 TABLET ORAL at 08:02

## 2022-02-12 RX ADMIN — WARFARIN SODIUM 7.5 MG: 7.5 TABLET ORAL at 05:02

## 2022-02-12 RX ADMIN — FUROSEMIDE 80 MG: 80 TABLET ORAL at 09:02

## 2022-02-12 RX ADMIN — Medication 400 MG: at 09:02

## 2022-02-12 RX ADMIN — BACLOFEN 10 MG: 10 TABLET ORAL at 02:02

## 2022-02-12 RX ADMIN — LISINOPRIL 10 MG: 10 TABLET ORAL at 09:02

## 2022-02-12 RX ADMIN — Medication 400 MG: at 03:02

## 2022-02-12 RX ADMIN — ATORVASTATIN CALCIUM 80 MG: 20 TABLET, FILM COATED ORAL at 09:02

## 2022-02-12 RX ADMIN — OXYCODONE HYDROCHLORIDE AND ACETAMINOPHEN 1 TABLET: 5; 325 TABLET ORAL at 03:02

## 2022-02-12 NOTE — PLAN OF CARE
Plan of care discussed with pt. VSS. A&Ox4; standby assist. Denies c/o of CP or SOB. Telemetry monitor showing ST. LVAD dressing change due 2/13/22. No VAD alarms overnight. Pt remains free of injury or falls. All questions addressed.

## 2022-02-12 NOTE — PLAN OF CARE
AAOx4.  No SOB or other distress.  No complaints of pain or otherwise at this time.  Free of falls, traumas, and injuries. LVAD numbers and MAPS WNL. Vital signs stable.  Plan of care reviewed with patient.  Will continue to monitor.

## 2022-02-12 NOTE — PLAN OF CARE
AAOx4.  Patient complaining of 5/10 headache this afternoon. Percocet given and MD notified. Patient in CT at this time. Free of falls, traumas, and injuries.  Vital signs stable. Plan of care reviewed with patient.  Will continue to monitor and report to night nurse.

## 2022-02-12 NOTE — ASSESSMENT & PLAN NOTE
-Patient had a traumatic subdural, subarachnoid hemorrhage after fall on 1/27. Currently no focal deficits  -NCC signed off. NSGY following.   -INR subtherapeutic. Coumadin restarted 2/5 at low dose with plans to watch INR rise slowly. PharmD to adjust dosing  -Goal Na+ > 135, slowly trickled down days after stopping hypertonic saline. Salt tabs given prn. Will monitor for improvement with increased diuresis.  -Keppra started as AED. EEG negative for seizures.  -Transcranial dopplers done 1/31 without evidence of vasospasm.  -Continue q2h neurochecks.

## 2022-02-12 NOTE — PROGRESS NOTES
02/12/2022  Vanessa Ramires    Current provider:  Pete Baker MD    Device interrogation:  TXP LVAD INTERROGATIONS 2/12/2022 2/12/2022 2/11/2022 2/11/2022 2/11/2022 2/11/2022 2/11/2022   Type HeartMate3 HeartMate3 HeartMate3 HeartMate3 HeartMate3 HeartMate3 HeartMate3   Flow 4 4.0 3.6 4.1 3.9 4 4.0   Speed 5000 5000 5000 5000 5000 5000 5000   PI 5.7 5.7 8.1 5.0 6.4 5.4 6.1   Power (Edwards) 3.4 3.4 3.4 3.4 3.4 3.3 3.5   LSL 4600 4600 4600 4600 4600 4600 4600   Pulsatility Intermittent pulse - - - Intermittent pulse Pulse -          Rounded on Rocco France to ensure all mechanical assist device settings (IABP or VAD) were appropriate and all parameters were within limits.  I was able to ensure all back up equipment was present, the staff had no issues, and the Perfusion Department daily rounding was complete.      For implantable VADs: Interrogation of Ventricular assist device was performed with analysis of device parameters and review of device function. I have personally reviewed the interrogation findings and agree with findings as stated.     In emergency, the nursing units have been notified to contact the perfusion department either by:  Calling [phone] from 630am to 4pm Mon thru Fri, utilizing the On-Call Finder functionality of Epic and searching for Perfusion, or by contacting the hospital  from 4pm to 630am and on weekends and asking to speak with the perfusionist on call.    8:12 AM

## 2022-02-12 NOTE — ASSESSMENT & PLAN NOTE
-S/P DT HM3 1/13/21  -Current speed 5000  -INR goal 2.0-3.0. Current INR subtherapeutic. Coumadin restarted as above.  -ASA stopped.   -LDH stable    Procedure: Device Interrogation Including analysis of device parameters  Current Settings: Ventricular Assist Device  Review of device function is stable/unstable stable         TXP LVAD INTERROGATIONS 2/12/2022 2/12/2022 2/12/2022 2/11/2022 2/11/2022 2/11/2022 2/11/2022   Type HeartMate3 HeartMate3 HeartMate3 HeartMate3 HeartMate3 HeartMate3 HeartMate3   Flow 4.5 4 4.0 3.6 4.1 3.9 4   Speed 5000 5000 5000 5000 5000 5000 5000   PI 2.9 5.7 5.7 8.1 5.0 6.4 5.4   Power (Edwards) 3.5 3.4 3.4 3.4 3.4 3.4 3.3   LSL 4600 4600 4600 4600 4600 4600 4600   Pulsatility Intermittent pulse Intermittent pulse - - - Intermittent pulse Pulse

## 2022-02-12 NOTE — PROGRESS NOTES
[facility] - Cardiology Stepdown  Heart Transplant  Progress Note    Patient Name: Rocco France  MRN: [id number]  Admission Date: 1/27/2022  Hospital Length of Stay: 16 days  Attending Physician: Pete Baker MD  Primary Care Provider: Teresa Mendoza NP  Principal Problem:Bilateral subdural hematomas    Subjective:     Interval History: No events overnight. STOKES remains, stable in location and pain scale. No neuro deficits. INR remains subtherapeutic at 1.2.    Continuous Infusions:   sodium chloride 0.9% 5 mL/hr (02/08/22 0914)     Scheduled Meds:   atorvastatin  80 mg Oral Daily    baclofen  10 mg Oral TID    famotidine  20 mg Oral BID    furosemide  80 mg Oral BID    lisinopriL  10 mg Oral Daily    lisinopriL  20 mg Oral QHS    magnesium oxide  400 mg Oral BID    polyethylene glycol  17 g Oral Daily    senna-docusate 8.6-50 mg  1 tablet Oral Daily    sodium chloride  1 g Oral Daily    warfarin  5 mg Oral Daily     PRN Meds:sodium chloride, acetaminophen, dextrose 10%, glucagon (human recombinant), insulin aspart U-100, oxyCODONE-acetaminophen    Review of patient's allergies indicates:   Allergen Reactions    Pcn [penicillins] Hives     Objective:     Vital Signs (Most Recent):  Temp: 97.8 °F (36.6 °C) (02/12/22 1128)  Pulse: 110 (02/12/22 1146)  Resp: 18 (02/12/22 1128)  BP: 91/67 (02/12/22 1128)  SpO2: 98 % (02/12/22 1128) Vital Signs (24h Range):  Temp:  [97.6 °F (36.4 °C)-98.3 °F (36.8 °C)] 97.8 °F (36.6 °C)  Pulse:  [] 110  Resp:  [14-18] 18  SpO2:  [95 %-100 %] 98 %  BP: ()/(0-79) 91/67     Patient Vitals for the past 72 hrs (Last 3 readings):   Weight   02/10/22 0600 78.3 kg (172 lb 9.9 oz)     Body mass index is 22.16 kg/m².      Intake/Output Summary (Last 24 hours) at 2/12/2022 1200  Last data filed at 2/12/2022 0328  Gross per 24 hour   Intake 300 ml   Output 1425 ml   Net -1125 ml       Hemodynamic Parameters:         Physical Exam  Constitutional:       General: He is  not in acute distress.     Appearance: He is well-developed.   HENT:      Head: Normocephalic and atraumatic.   Eyes:      General:         Right eye: No discharge.         Left eye: No discharge.   Cardiovascular:      Comments: Smooth VAD hum  Pulmonary:      Effort: Pulmonary effort is normal. No respiratory distress.   Abdominal:      General: There is no distension.      Palpations: Abdomen is soft.   Musculoskeletal:         General: No deformity.      Cervical back: Neck supple.   Skin:     General: Skin is warm and dry.   Neurological:      Mental Status: He is alert and oriented to person, place, and time.      Comments: Follows commands   Generalized deconditioning  Latoya   Psychiatric:         Behavior: Behavior normal. Behavior is cooperative.         Cognition and Memory: Cognition is not impaired.         Significant Labs:  CBC:  Recent Labs   Lab 02/10/22  0517 02/11/22  0437 02/12/22  0240   WBC 8.25  8.25 8.92  8.92 8.81  8.81   RBC 3.21*  3.21* 3.29*  3.29* 3.43*  3.43*   HGB 9.0*  9.0* 9.5*  9.5* 9.6*  9.6*   HCT 28.5*  28.5* 30.7*  30.7* 30.9*  30.9*     189 179  179 193  193   MCV 89  89 93  93 90  90   MCH 28.0  28.0 28.9  28.9 28.0  28.0   MCHC 31.6*  31.6* 30.9*  30.9* 31.1*  31.1*     BNP:  Recent Labs   Lab 02/07/22  0232 02/09/22  0302 02/11/22 0437   * 258* 302*     CMP:  Recent Labs   Lab 02/07/22  0232 02/08/22  0444 02/09/22  0302 02/09/22  0303 02/10/22  0517 02/11/22  0437 02/12/22  0239   *   < >  --    < > 143*  143* 152*  152* 111*  111*   CALCIUM 8.4*   < >  --    < > 8.6*  8.6* 9.0  9.0 9.4  9.4   ALBUMIN 2.3*  --  2.5*  --   --  2.6*  2.6*  --    PROT 6.6  --  7.1  --   --  7.7  7.7  --       < >  --    < > 131*  131* 134*  134* 136  136   K 4.0   < >  --    < > 3.9  3.9 3.9  3.9 4.1  4.1   CO2 24   < >  --    < > 27  27 25  25 26  26      < >  --    < > 99  99 99  99 100  100   BUN 17   < >  --    < >  18  18 23  23 21  21   CREATININE 0.7   < >  --    < > 0.8  0.8 0.8  0.8 0.7  0.7   ALKPHOS 119  --  125  --   --  137*  137*  --    ALT 7*  --  <5*  --   --  8*  8*  --    AST 13  --  12  --   --  15  15  --    BILITOT 1.1*  --  1.3*  --   --  1.4*  1.4*  --     < > = values in this interval not displayed.      Coagulation:   Recent Labs   Lab 02/10/22  0517 02/11/22  0437 02/12/22  0239   INR 1.2  1.2 1.2  1.2 1.2  1.2   APTT 29.7  29.7 28.6  28.6 30.0  30.0     LDH:  Recent Labs   Lab 02/10/22  0517 02/11/22  0437 02/12/22  0239    196 235     Microbiology:  Microbiology Results (last 7 days)     ** No results found for the last 168 hours. **          I have reviewed all pertinent labs within the past 24 hours.    Estimated Creatinine Clearance: 115 mL/min (based on SCr of 0.7 mg/dL).    Diagnostic Results:  I have reviewed and interpreted all pertinent imaging results/findings within the past 24 hours.    Assessment and Plan:     67 yo BM with stage D CHF due to NICMP s/p HM3, s/p ICD, HTN, HLD, T2DM was transferred from outside hospital emergency room after he was found to have subdural hematoma/subarachnoid hemorrhage.  Patient presented to the ER after he was found to have a syncopal event.  Also patient had respiratory distress on arrival for which he was intubated.  Patient was transferred here for further evaluation.  On arrival patient is intubated and is on propofol.  Neurosurgery and Neuro Critical Care were immediately informed.  Neurosurgery recommended to repeat CT after reviewing the prior CT done in the ER in [place].  INR on arrival is 1.6.  He also had a low flow with a increased PI around 4:00 p.m. this evening.  According to wife patient was complaining of headache for the last 2 weeks..  [place] went [place] post office this [place] and she does not know what exactly happened..  ICD was interrogated and did not show any VT/VF.       * Bilateral subdural hematomas  -Patient had  a traumatic subdural, subarachnoid hemorrhage after fall on 1/27. Currently no focal deficits  -NCC signed off. NSGY following.   -INR subtherapeutic. Coumadin restarted 2/5 at low dose with plans to watch INR rise slowly. PharmD to adjust dosing  -Goal Na+ > 135, slowly trickled down days after stopping hypertonic saline. Salt tabs given prn. Will monitor for improvement with increased diuresis.  -Keppra started as AED. EEG negative for seizures.  -Transcranial dopplers done 1/31 without evidence of vasospasm.  -Continue q2h neurochecks.    Acute respiratory failure requiring reintubation  - Extubated 1/28 and on RA  - Tx'd with remdesivir which was since discontinued. Asymptomatic from covid perspective    LVAD (left ventricular assist device) present  -S/P DT HM3 1/13/21  -Current speed 5000  -INR goal 2.0-3.0. Current INR subtherapeutic. Coumadin restarted as above.  -ASA stopped.   -LDH stable    Procedure: Device Interrogation Including analysis of device parameters  Current Settings: Ventricular Assist Device  Review of device function is stable/unstable stable         TXP LVAD INTERROGATIONS 2/12/2022 2/12/2022 2/12/2022 2/11/2022 2/11/2022 2/11/2022 2/11/2022   Type HeartMate3 HeartMate3 HeartMate3 HeartMate3 HeartMate3 HeartMate3 HeartMate3   Flow 4.5 4 4.0 3.6 4.1 3.9 4   Speed 5000 5000 5000 5000 5000 5000 5000   PI 2.9 5.7 5.7 8.1 5.0 6.4 5.4   Power (Edwards) 3.5 3.4 3.4 3.4 3.4 3.4 3.3   LSL 4600 4600 4600 4600 4600 4600 4600   Pulsatility Intermittent pulse Intermittent pulse - - - Intermittent pulse Pulse       Type 2 diabetes mellitus without complication  -SSI  ACHS     Essential hypertension  -Levophed off. MAP goal >65  -Continue Lisinopril 10qam/20qhs,  doxazosin 2mg BID initiated but discontinued yesterday as BP controlled with adjust lisinopril dosing. Will monitor closely and reinitiate if elevated Bps return      Valentina Jose PA-C  Heart Transplant  [facility] - Cardiology Stepdown

## 2022-02-12 NOTE — CARE UPDATE
Notified by RN that patient is complaining of a headache. Patient states he is currently experiencing a 5/10 headache that feels different from his usual ones. He states it is associated with dizziness. Denies any blurred vision. Neurological exam not revealing any focal deficits. Vital signs stable. Given patient's hx of bilateral subdural hematomas and change in severity of the headache from baseline, will order CT head without contrast.

## 2022-02-12 NOTE — SUBJECTIVE & OBJECTIVE
Interval History: No events overnight. STOKES remains, stable in location and pain scale. No neuro deficits. INR remains subtherapeutic at 1.2.    Continuous Infusions:   sodium chloride 0.9% 5 mL/hr (02/08/22 0914)     Scheduled Meds:   atorvastatin  80 mg Oral Daily    baclofen  10 mg Oral TID    famotidine  20 mg Oral BID    furosemide  80 mg Oral BID    lisinopriL  10 mg Oral Daily    lisinopriL  20 mg Oral QHS    magnesium oxide  400 mg Oral BID    polyethylene glycol  17 g Oral Daily    senna-docusate 8.6-50 mg  1 tablet Oral Daily    sodium chloride  1 g Oral Daily    warfarin  5 mg Oral Daily     PRN Meds:sodium chloride, acetaminophen, dextrose 10%, glucagon (human recombinant), insulin aspart U-100, oxyCODONE-acetaminophen    Review of patient's allergies indicates:   Allergen Reactions    Pcn [penicillins] Hives     Objective:     Vital Signs (Most Recent):  Temp: 97.8 °F (36.6 °C) (02/12/22 1128)  Pulse: 110 (02/12/22 1146)  Resp: 18 (02/12/22 1128)  BP: 91/67 (02/12/22 1128)  SpO2: 98 % (02/12/22 1128) Vital Signs (24h Range):  Temp:  [97.6 °F (36.4 °C)-98.3 °F (36.8 °C)] 97.8 °F (36.6 °C)  Pulse:  [] 110  Resp:  [14-18] 18  SpO2:  [95 %-100 %] 98 %  BP: ()/(0-79) 91/67     Patient Vitals for the past 72 hrs (Last 3 readings):   Weight   02/10/22 0600 78.3 kg (172 lb 9.9 oz)     Body mass index is 22.16 kg/m².      Intake/Output Summary (Last 24 hours) at 2/12/2022 1200  Last data filed at 2/12/2022 0328  Gross per 24 hour   Intake 300 ml   Output 1425 ml   Net -1125 ml       Hemodynamic Parameters:         Physical Exam  Constitutional:       General: He is not in acute distress.     Appearance: He is well-developed.   HENT:      Head: Normocephalic and atraumatic.   Eyes:      General:         Right eye: No discharge.         Left eye: No discharge.   Cardiovascular:      Comments: Smooth VAD hum  Pulmonary:      Effort: Pulmonary effort is normal. No respiratory distress.    Abdominal:      General: There is no distension.      Palpations: Abdomen is soft.   Musculoskeletal:         General: No deformity.      Cervical back: Neck supple.   Skin:     General: Skin is warm and dry.   Neurological:      Mental Status: He is alert and oriented to person, place, and time.      Comments: Follows commands   Generalized deconditioning  Latoya   Psychiatric:         Behavior: Behavior normal. Behavior is cooperative.         Cognition and Memory: Cognition is not impaired.         Significant Labs:  CBC:  Recent Labs   Lab 02/10/22  0517 02/11/22  0437 02/12/22  0240   WBC 8.25  8.25 8.92  8.92 8.81  8.81   RBC 3.21*  3.21* 3.29*  3.29* 3.43*  3.43*   HGB 9.0*  9.0* 9.5*  9.5* 9.6*  9.6*   HCT 28.5*  28.5* 30.7*  30.7* 30.9*  30.9*     189 179  179 193  193   MCV 89  89 93  93 90  90   MCH 28.0  28.0 28.9  28.9 28.0  28.0   MCHC 31.6*  31.6* 30.9*  30.9* 31.1*  31.1*     BNP:  Recent Labs   Lab 02/07/22  0232 02/09/22  0302 02/11/22  0437   * 258* 302*     CMP:  Recent Labs   Lab 02/07/22  0232 02/08/22  0444 02/09/22  0302 02/09/22  0303 02/10/22  0517 02/11/22  0437 02/12/22  0239   *   < >  --    < > 143*  143* 152*  152* 111*  111*   CALCIUM 8.4*   < >  --    < > 8.6*  8.6* 9.0  9.0 9.4  9.4   ALBUMIN 2.3*  --  2.5*  --   --  2.6*  2.6*  --    PROT 6.6  --  7.1  --   --  7.7  7.7  --       < >  --    < > 131*  131* 134*  134* 136  136   K 4.0   < >  --    < > 3.9  3.9 3.9  3.9 4.1  4.1   CO2 24   < >  --    < > 27  27 25  25 26  26      < >  --    < > 99  99 99  99 100  100   BUN 17   < >  --    < > 18  18 23  23 21  21   CREATININE 0.7   < >  --    < > 0.8  0.8 0.8  0.8 0.7  0.7   ALKPHOS 119  --  125  --   --  137*  137*  --    ALT 7*  --  <5*  --   --  8*  8*  --    AST 13  --  12  --   --  15  15  --    BILITOT 1.1*  --  1.3*  --   --  1.4*  1.4*  --     < > = values in this interval not displayed.       Coagulation:   Recent Labs   Lab 02/10/22  0517 02/11/22  0437 02/12/22  0239   INR 1.2  1.2 1.2  1.2 1.2  1.2   APTT 29.7  29.7 28.6  28.6 30.0  30.0     LDH:  Recent Labs   Lab 02/10/22  0517 02/11/22  0437 02/12/22  0239    196 235     Microbiology:  Microbiology Results (last 7 days)     ** No results found for the last 168 hours. **          I have reviewed all pertinent labs within the past 24 hours.    Estimated Creatinine Clearance: 115 mL/min (based on SCr of 0.7 mg/dL).    Diagnostic Results:  I have reviewed and interpreted all pertinent imaging results/findings within the past 24 hours.

## 2022-02-13 LAB
ANION GAP SERPL CALC-SCNC: 9 MMOL/L (ref 8–16)
ANION GAP SERPL CALC-SCNC: 9 MMOL/L (ref 8–16)
APTT BLDCRRT: 28.5 SEC (ref 21–32)
APTT BLDCRRT: 28.5 SEC (ref 21–32)
BASOPHILS # BLD AUTO: 0.03 K/UL (ref 0–0.2)
BASOPHILS # BLD AUTO: 0.03 K/UL (ref 0–0.2)
BASOPHILS NFR BLD: 0.3 % (ref 0–1.9)
BASOPHILS NFR BLD: 0.3 % (ref 0–1.9)
BUN SERPL-MCNC: 29 MG/DL (ref 8–23)
BUN SERPL-MCNC: 29 MG/DL (ref 8–23)
CALCIUM SERPL-MCNC: 9.4 MG/DL (ref 8.7–10.5)
CALCIUM SERPL-MCNC: 9.4 MG/DL (ref 8.7–10.5)
CHLORIDE SERPL-SCNC: 100 MMOL/L (ref 95–110)
CHLORIDE SERPL-SCNC: 100 MMOL/L (ref 95–110)
CO2 SERPL-SCNC: 28 MMOL/L (ref 23–29)
CO2 SERPL-SCNC: 28 MMOL/L (ref 23–29)
CREAT SERPL-MCNC: 0.9 MG/DL (ref 0.5–1.4)
CREAT SERPL-MCNC: 0.9 MG/DL (ref 0.5–1.4)
DIFFERENTIAL METHOD: ABNORMAL
DIFFERENTIAL METHOD: ABNORMAL
EOSINOPHIL # BLD AUTO: 0.1 K/UL (ref 0–0.5)
EOSINOPHIL # BLD AUTO: 0.1 K/UL (ref 0–0.5)
EOSINOPHIL NFR BLD: 1.2 % (ref 0–8)
EOSINOPHIL NFR BLD: 1.2 % (ref 0–8)
ERYTHROCYTE [DISTWIDTH] IN BLOOD BY AUTOMATED COUNT: 15.3 % (ref 11.5–14.5)
ERYTHROCYTE [DISTWIDTH] IN BLOOD BY AUTOMATED COUNT: 15.3 % (ref 11.5–14.5)
EST. GFR  (AFRICAN AMERICAN): >60 ML/MIN/1.73 M^2
EST. GFR  (AFRICAN AMERICAN): >60 ML/MIN/1.73 M^2
EST. GFR  (NON AFRICAN AMERICAN): >60 ML/MIN/1.73 M^2
EST. GFR  (NON AFRICAN AMERICAN): >60 ML/MIN/1.73 M^2
GLUCOSE SERPL-MCNC: 166 MG/DL (ref 70–110)
GLUCOSE SERPL-MCNC: 166 MG/DL (ref 70–110)
HCT VFR BLD AUTO: 32.8 % (ref 40–54)
HCT VFR BLD AUTO: 32.8 % (ref 40–54)
HGB BLD-MCNC: 9.9 G/DL (ref 14–18)
HGB BLD-MCNC: 9.9 G/DL (ref 14–18)
IMM GRANULOCYTES # BLD AUTO: 0.03 K/UL (ref 0–0.04)
IMM GRANULOCYTES # BLD AUTO: 0.03 K/UL (ref 0–0.04)
IMM GRANULOCYTES NFR BLD AUTO: 0.3 % (ref 0–0.5)
IMM GRANULOCYTES NFR BLD AUTO: 0.3 % (ref 0–0.5)
INR PPP: 1.2 (ref 0.8–1.2)
INR PPP: 1.2 (ref 0.8–1.2)
LDH SERPL L TO P-CCNC: 212 U/L (ref 110–260)
LYMPHOCYTES # BLD AUTO: 1.4 K/UL (ref 1–4.8)
LYMPHOCYTES # BLD AUTO: 1.4 K/UL (ref 1–4.8)
LYMPHOCYTES NFR BLD: 13.9 % (ref 18–48)
LYMPHOCYTES NFR BLD: 13.9 % (ref 18–48)
MAGNESIUM SERPL-MCNC: 1.9 MG/DL (ref 1.6–2.6)
MAGNESIUM SERPL-MCNC: 1.9 MG/DL (ref 1.6–2.6)
MCH RBC QN AUTO: 28.7 PG (ref 27–31)
MCH RBC QN AUTO: 28.7 PG (ref 27–31)
MCHC RBC AUTO-ENTMCNC: 30.2 G/DL (ref 32–36)
MCHC RBC AUTO-ENTMCNC: 30.2 G/DL (ref 32–36)
MCV RBC AUTO: 95 FL (ref 82–98)
MCV RBC AUTO: 95 FL (ref 82–98)
MONOCYTES # BLD AUTO: 0.7 K/UL (ref 0.3–1)
MONOCYTES # BLD AUTO: 0.7 K/UL (ref 0.3–1)
MONOCYTES NFR BLD: 7.3 % (ref 4–15)
MONOCYTES NFR BLD: 7.3 % (ref 4–15)
NEUTROPHILS # BLD AUTO: 7.6 K/UL (ref 1.8–7.7)
NEUTROPHILS # BLD AUTO: 7.6 K/UL (ref 1.8–7.7)
NEUTROPHILS NFR BLD: 77 % (ref 38–73)
NEUTROPHILS NFR BLD: 77 % (ref 38–73)
NRBC BLD-RTO: 0 /100 WBC
NRBC BLD-RTO: 0 /100 WBC
PHOSPHATE SERPL-MCNC: 2.9 MG/DL (ref 2.7–4.5)
PHOSPHATE SERPL-MCNC: 2.9 MG/DL (ref 2.7–4.5)
PLATELET # BLD AUTO: 185 K/UL (ref 150–450)
PLATELET # BLD AUTO: 185 K/UL (ref 150–450)
PMV BLD AUTO: 11.2 FL (ref 9.2–12.9)
PMV BLD AUTO: 11.2 FL (ref 9.2–12.9)
POCT GLUCOSE: 222 MG/DL (ref 70–110)
POTASSIUM SERPL-SCNC: 4.6 MMOL/L (ref 3.5–5.1)
POTASSIUM SERPL-SCNC: 4.6 MMOL/L (ref 3.5–5.1)
PROTHROMBIN TIME: 12.6 SEC (ref 9–12.5)
PROTHROMBIN TIME: 12.6 SEC (ref 9–12.5)
RBC # BLD AUTO: 3.45 M/UL (ref 4.6–6.2)
RBC # BLD AUTO: 3.45 M/UL (ref 4.6–6.2)
SODIUM SERPL-SCNC: 137 MMOL/L (ref 136–145)
SODIUM SERPL-SCNC: 137 MMOL/L (ref 136–145)
WBC # BLD AUTO: 9.9 K/UL (ref 3.9–12.7)
WBC # BLD AUTO: 9.9 K/UL (ref 3.9–12.7)

## 2022-02-13 PROCEDURE — 80048 BASIC METABOLIC PNL TOTAL CA: CPT | Performed by: HOSPITALIST

## 2022-02-13 PROCEDURE — 25000003 PHARM REV CODE 250: Performed by: PHYSICIAN ASSISTANT

## 2022-02-13 PROCEDURE — 84100 ASSAY OF PHOSPHORUS: CPT | Performed by: HOSPITALIST

## 2022-02-13 PROCEDURE — 25000003 PHARM REV CODE 250: Performed by: STUDENT IN AN ORGANIZED HEALTH CARE EDUCATION/TRAINING PROGRAM

## 2022-02-13 PROCEDURE — 25000003 PHARM REV CODE 250: Performed by: HOSPITALIST

## 2022-02-13 PROCEDURE — 99233 PR SUBSEQUENT HOSPITAL CARE,LEVL III: ICD-10-PCS | Mod: ,,, | Performed by: PHYSICIAN ASSISTANT

## 2022-02-13 PROCEDURE — 85730 THROMBOPLASTIN TIME PARTIAL: CPT | Performed by: HOSPITALIST

## 2022-02-13 PROCEDURE — 97116 GAIT TRAINING THERAPY: CPT | Mod: CQ

## 2022-02-13 PROCEDURE — 25000003 PHARM REV CODE 250: Performed by: INTERNAL MEDICINE

## 2022-02-13 PROCEDURE — 99233 SBSQ HOSP IP/OBS HIGH 50: CPT | Mod: ,,, | Performed by: PHYSICIAN ASSISTANT

## 2022-02-13 PROCEDURE — 85025 COMPLETE CBC W/AUTO DIFF WBC: CPT | Performed by: HOSPITALIST

## 2022-02-13 PROCEDURE — 93750 INTERROGATION VAD IN PERSON: CPT | Mod: ,,, | Performed by: INTERNAL MEDICINE

## 2022-02-13 PROCEDURE — 27000248 HC VAD-ADDITIONAL DAY

## 2022-02-13 PROCEDURE — 36415 COLL VENOUS BLD VENIPUNCTURE: CPT | Performed by: HOSPITALIST

## 2022-02-13 PROCEDURE — 83735 ASSAY OF MAGNESIUM: CPT | Performed by: HOSPITALIST

## 2022-02-13 PROCEDURE — 93750 PR INTERROGATE VENT ASSIST DEV, IN PERSON, W PHYSICIAN ANALYSIS: ICD-10-PCS | Mod: ,,, | Performed by: INTERNAL MEDICINE

## 2022-02-13 PROCEDURE — 83615 LACTATE (LD) (LDH) ENZYME: CPT | Performed by: HOSPITALIST

## 2022-02-13 PROCEDURE — 20600001 HC STEP DOWN PRIVATE ROOM

## 2022-02-13 PROCEDURE — 85610 PROTHROMBIN TIME: CPT | Performed by: HOSPITALIST

## 2022-02-13 PROCEDURE — 97530 THERAPEUTIC ACTIVITIES: CPT | Mod: CQ

## 2022-02-13 RX ORDER — AMOXICILLIN 250 MG
2 CAPSULE ORAL ONCE
Status: COMPLETED | OUTPATIENT
Start: 2022-02-13 | End: 2022-02-13

## 2022-02-13 RX ORDER — WARFARIN SODIUM 5 MG/1
5 TABLET ORAL DAILY
Status: DISCONTINUED | OUTPATIENT
Start: 2022-02-14 | End: 2022-02-14

## 2022-02-13 RX ORDER — WARFARIN 7.5 MG/1
7.5 TABLET ORAL ONCE
Status: COMPLETED | OUTPATIENT
Start: 2022-02-13 | End: 2022-02-13

## 2022-02-13 RX ADMIN — LISINOPRIL 10 MG: 10 TABLET ORAL at 08:02

## 2022-02-13 RX ADMIN — LISINOPRIL 20 MG: 20 TABLET ORAL at 08:02

## 2022-02-13 RX ADMIN — SENNOSIDES AND DOCUSATE SODIUM 2 TABLET: 50; 8.6 TABLET ORAL at 01:02

## 2022-02-13 RX ADMIN — Medication 1 G: at 08:02

## 2022-02-13 RX ADMIN — Medication 400 MG: at 08:02

## 2022-02-13 RX ADMIN — FAMOTIDINE 20 MG: 20 TABLET ORAL at 08:02

## 2022-02-13 RX ADMIN — BACLOFEN 10 MG: 10 TABLET ORAL at 08:02

## 2022-02-13 RX ADMIN — INSULIN ASPART 1 UNITS: 100 INJECTION, SOLUTION INTRAVENOUS; SUBCUTANEOUS at 07:02

## 2022-02-13 RX ADMIN — ATORVASTATIN CALCIUM 80 MG: 20 TABLET, FILM COATED ORAL at 08:02

## 2022-02-13 RX ADMIN — POLYETHYLENE GLYCOL 3350 17 G: 17 POWDER, FOR SOLUTION ORAL at 02:02

## 2022-02-13 RX ADMIN — FUROSEMIDE 80 MG: 80 TABLET ORAL at 08:02

## 2022-02-13 RX ADMIN — SENNOSIDES AND DOCUSATE SODIUM 1 TABLET: 50; 8.6 TABLET ORAL at 02:02

## 2022-02-13 RX ADMIN — BACLOFEN 10 MG: 10 TABLET ORAL at 02:02

## 2022-02-13 RX ADMIN — FUROSEMIDE 80 MG: 80 TABLET ORAL at 05:02

## 2022-02-13 RX ADMIN — WARFARIN SODIUM 7.5 MG: 7.5 TABLET ORAL at 05:02

## 2022-02-13 NOTE — PLAN OF CARE
Plan of care discussed with patient.  Patient ambulating independently, fall precautions in place. LVAD DP and numbers WNL, smooth LVAD hum. Patient has no complaints of pain. Pt's HA improved to 0/10 by 2330, 2/12. , pt had eaten a late dinner.  Discussed medications and care. Patient has no questions at this time. Will continue to monitor.

## 2022-02-13 NOTE — PLAN OF CARE
LVAD dressing change completed. Patient had a BM today. Plan of care discussed with patient.  Patient ambulating with assistance, fall precautions in place. LVAD DP and numbers WNL, smooth LVAD hum. Patient has no complaints of pain. Discussed medications and care. Patient has no questions at this time. Will continue to monitor.

## 2022-02-13 NOTE — PT/OT/SLP PROGRESS
"Physical Therapy Treatment    Patient Name:  Rocco France   MRN:  [id number]    Recommendations:     Discharge Recommendations:  rehabilitation facility   Discharge Equipment Recommendations: none   Barriers to discharge: Decreased caregiver support    Assessment:     Rocco France is a 66 y.o. male admitted with a medical diagnosis of Bilateral subdural hematomas.  He presents with the following impairments/functional limitations:  weakness,impaired endurance,impaired sensation,impaired self care skills,impaired functional mobilty,gait instability,impaired balance,decreased coordination,impaired coordination.  Pt tolerated today's therapy session fairly well. Pt was able to taylor VAD vest and switch power source with SBA, but required increase time to complete. Pt was able to slightly increase gait distance, 25ft with CGA and no AD. Pt is progressing well and continues to benefit from therapy to improve functional mobility and endurance.     Rehab Prognosis: Good; patient would benefit from acute skilled PT services to address these deficits and reach maximum level of function.    Recent Surgery: * No surgery found *      Plan:     During this hospitalization, patient to be seen 4 x/week to address the identified rehab impairments via gait training,therapeutic activities,therapeutic exercises,neuromuscular re-education and progress toward the following goals:    · Plan of Care Expires:  03/03/22    Subjective     Patient/Family Comments/goals: "doing alright"  Pain/Comfort:  · Pain Rating 1: 0/10      Objective:     Communicated with nurse prior to session.  Patient found HOB elevated with telemetry,LVAD,peripheral IV upon PT entry to room.     General Precautions: Standard, LVAD,fall   Orthopedic Precautions:N/A   Braces: N/A  Respiratory Status: Room air     Functional Mobility:  · Bed Mobility:     · Scooting to EOB: stand by assistance  · Supine to Sit: stand by assistance with HOB elevated and bed " rails  · Transfers:     · Sit to Stand: 2 trials from EOB. contact guard assistance with no AD  · Bed to Chair: contact guard assistance with  no AD  using  Stand Pivot  · Gait: pt ambulated ~25ft with noAD and CGA. Pt demonstrated mildly unsteady gait with decrease yayo, decrease step length, and narrow TAMI. Verbal cues for upright posture with good compliance.       AM-PAC 6 CLICK MOBILITY  Turning over in bed (including adjusting bedclothes, sheets and blankets)?: 4  Sitting down on and standing up from a chair with arms (e.g., wheelchair, bedside commode, etc.): 3  Moving from lying on back to sitting on the side of the bed?: 3  Moving to and from a bed to a chair (including a wheelchair)?: 3  Need to walk in hospital room?: 3  Climbing 3-5 steps with a railing?: 2  Basic Mobility Total Score: 18       Therapeutic Activities and Exercises:  · Donned vest for VAD and transfer from wall power to battery power with minimal A to untangle wires. Required increase time to complete  Patient educated on role of therapy, goals of session, and benefits of out of bed mobility.   Instructed on use of call button and importance of calling nursing staff for assistance with mobility   Questions/concerns addressed within PTA scope of practice  Pt verbalized understanding.  Whiteboard Updated    Patient left up in chair (LVAD on battery power) with all lines intact, call button in reach and nurse notified.    GOALS:   Multidisciplinary Problems     Physical Therapy Goals        Problem: Physical Therapy Goal    Goal Priority Disciplines Outcome Goal Variances Interventions   Physical Therapy Goal     PT, PT/OT Ongoing, Progressing     Description: Goals to be met by: 2022      Patient will increase functional independence with mobility by performin. Supine to sit with Modified [place]  2. Sit to stand transfer with Modified [place]  3. Bed to chair transfer with Modified [place] using the least  restrictive device.   4. Gait  x 150 feet with Modified [place] using the least restrictive device.                       Time Tracking:     PT Received On: 02/13/22  PT Start Time: 1157     PT Stop Time: 1220  PT Total Time (min): 23 min     Billable Minutes: Gait Training 10 and Therapeutic Activity 13    Treatment Type: Treatment  PT/PTA: PTA     PTA Visit Number: 1     02/13/2022

## 2022-02-13 NOTE — PROGRESS NOTES
[facility] - Cardiology Stepdown  Heart Transplant  Progress Note    Patient Name: Rocco France  MRN: [id number]  Admission Date: 1/27/2022  Hospital Length of Stay: 17 days  Attending Physician: Pete Baker MD  Primary Care Provider: Teresa Mendoza NP  Principal Problem:Bilateral subdural hematomas    Subjective:     Interval History: Sent for head CT yesterday evening with reports of HA different from before. CTH with no acute changes, showing overall stability with evolving subdural hematoma. HA has resolved. INR remains therapeutic at at 1.2 despite increased dosing.       Continuous Infusions:   sodium chloride 0.9% 5 mL/hr (02/08/22 0914)     Scheduled Meds:   atorvastatin  80 mg Oral Daily    baclofen  10 mg Oral TID    famotidine  20 mg Oral BID    furosemide  80 mg Oral BID    lisinopriL  10 mg Oral Daily    lisinopriL  20 mg Oral QHS    magnesium oxide  400 mg Oral BID    polyethylene glycol  17 g Oral Daily    senna-docusate 8.6-50 mg  1 tablet Oral Daily    sodium chloride  1 g Oral Daily    warfarin  5 mg Oral Daily     PRN Meds:sodium chloride, acetaminophen, dextrose 10%, glucagon (human recombinant), insulin aspart U-100, oxyCODONE-acetaminophen    Review of patient's allergies indicates:   Allergen Reactions    Pcn [penicillins] Hives     Objective:     Vital Signs (Most Recent):  Temp: 96.8 °F (36 °C) (02/13/22 1123)  Pulse: 93 (02/13/22 1123)  Resp: 18 (02/13/22 1123)  BP: 98/74 (02/13/22 0758)  SpO2: 100 % (02/13/22 1123) Vital Signs (24h Range):  Temp:  [96.8 °F (36 °C)-98.2 °F (36.8 °C)] 96.8 °F (36 °C)  Pulse:  [] 93  Resp:  [16-18] 18  SpO2:  [99 %-100 %] 100 %  BP: ()/(0-75) 98/74     Patient Vitals for the past 72 hrs (Last 3 readings):   Weight   02/13/22 0445 77.9 kg (171 lb 11.8 oz)     Body mass index is 22.05 kg/m².      Intake/Output Summary (Last 24 hours) at 2/13/2022 1150  Last data filed at 2/13/2022 0431  Gross per 24 hour   Intake 480 ml    Output 800 ml   Net -320 ml       Hemodynamic Parameters:         Physical Exam  Constitutional:       General: He is not in acute distress.     Appearance: He is well-developed.   HENT:      Head: Normocephalic and atraumatic.   Eyes:      General:         Right eye: No discharge.         Left eye: No discharge.   Cardiovascular:      Comments: Smooth VAD hum  Pulmonary:      Effort: Pulmonary effort is normal. No respiratory distress.   Abdominal:      General: There is no distension.      Palpations: Abdomen is soft.   Musculoskeletal:         General: No deformity.      Cervical back: Neck supple.   Skin:     General: Skin is warm and dry.   Neurological:      Mental Status: He is alert and oriented to person, place, and time.      Comments: Follows commands   Generalized deconditioning  Latoya   Psychiatric:         Behavior: Behavior normal. Behavior is cooperative.         Cognition and Memory: Cognition is not impaired.         Significant Labs:  CBC:  Recent Labs   Lab 02/11/22  0437 02/12/22  0240 02/13/22 0431   WBC 8.92  8.92 8.81  8.81 9.90  9.90   RBC 3.29*  3.29* 3.43*  3.43* 3.45*  3.45*   HGB 9.5*  9.5* 9.6*  9.6* 9.9*  9.9*   HCT 30.7*  30.7* 30.9*  30.9* 32.8*  32.8*     179 193  193 185  185   MCV 93  93 90  90 95  95   MCH 28.9  28.9 28.0  28.0 28.7  28.7   MCHC 30.9*  30.9* 31.1*  31.1* 30.2*  30.2*     BNP:  Recent Labs   Lab 02/07/22  0232 02/09/22  0302 02/11/22 0437   * 258* 302*     CMP:  Recent Labs   Lab 02/07/22  0232 02/08/22  0444 02/09/22  0302 02/09/22  0303 02/11/22  0437 02/12/22  0239 02/13/22  0431   *   < >  --    < > 152*  152* 111*  111* 166*  166*   CALCIUM 8.4*   < >  --    < > 9.0  9.0 9.4  9.4 9.4  9.4   ALBUMIN 2.3*  --  2.5*  --  2.6*  2.6*  --   --    PROT 6.6  --  7.1  --  7.7  7.7  --   --       < >  --    < > 134*  134* 136  136 137  137   K 4.0   < >  --    < > 3.9  3.9 4.1  4.1 4.6  4.6   CO2 24    < >  --    < > 25  25 26  26 28  28      < >  --    < > 99  99 100  100 100  100   BUN 17   < >  --    < > 23  23 21  21 29*  29*   CREATININE 0.7   < >  --    < > 0.8  0.8 0.7  0.7 0.9  0.9   ALKPHOS 119  --  125  --  137*  137*  --   --    ALT 7*  --  <5*  --  8*  8*  --   --    AST 13  --  12  --  15  15  --   --    BILITOT 1.1*  --  1.3*  --  1.4*  1.4*  --   --     < > = values in this interval not displayed.      Coagulation:   Recent Labs   Lab 02/11/22 0437 02/12/22 0239 02/13/22 0431   INR 1.2  1.2 1.2  1.2 1.2  1.2   APTT 28.6  28.6 30.0  30.0 28.5  28.5     LDH:  Recent Labs   Lab 02/11/22 0437 02/12/22 0239 02/13/22 0431    235 212     Microbiology:  Microbiology Results (last 7 days)     ** No results found for the last 168 hours. **          I have reviewed all pertinent labs within the past 24 hours.    Estimated Creatinine Clearance: 89 mL/min (based on SCr of 0.9 mg/dL).    Diagnostic Results:  I have reviewed and interpreted all pertinent imaging results/findings within the past 24 hours.    Assessment and Plan:     67 yo BM with stage D CHF due to NICMP s/p HM3, s/p ICD, HTN, HLD, T2DM was transferred from outside hospital emergency room after he was found to have subdural hematoma/subarachnoid hemorrhage.  Patient presented to the ER after he was found to have a syncopal event.  Also patient had respiratory distress on arrival for which he was intubated.  Patient was transferred here for further evaluation.  On arrival patient is intubated and is on propofol.  Neurosurgery and Neuro Critical Care were immediately informed.  Neurosurgery recommended to repeat CT after reviewing the prior CT done in the ER in [place].  INR on arrival is 1.6.  He also had a low flow with a increased PI around 4:00 p.m. this evening.  According to wife patient was complaining of headache for the last 2 weeks..  He went to post office this afternoon and she does not know what  exactly happened..  ICD was interrogated and did not show any VT/VF.       * Bilateral subdural hematomas  -Patient had a traumatic subdural, subarachnoid hemorrhage after fall on 1/27. Currently no focal deficits  -NCC signed off. NSGY following.   -INR subtherapeutic. Coumadin restarted 2/5 at low dose with plans to watch INR rise slowly. PharmD to adjust dosing  -Goal Na+ > 135, slowly trickled down days after stopping hypertonic saline. Salt tabs given prn. Will monitor for improvement with increased diuresis.  -Keppra started as AED. EEG negative for seizures.  -Transcranial dopplers done 1/31 without evidence of vasospasm.  -Continue q2h neurochecks.    Acute respiratory failure requiring reintubation  - Extubated 1/28 and on RA  - Tx'd with remdesivir which was since discontinued. Asymptomatic from covid perspective    LVAD (left ventricular assist device) present  -S/P DT HM3 1/13/21  -Current speed 5000  -INR goal 2.0-3.0. Current INR subtherapeutic. Coumadin restarted as above.  -ASA stopped.   -LDH stable    Procedure: Device Interrogation Including analysis of device parameters  Current Settings: Ventricular Assist Device  Review of device function is stable/unstable stable         TXP LVAD INTERROGATIONS 2/13/2022 2/13/2022 2/13/2022 2/12/2022 2/12/2022 2/12/2022 2/12/2022   Type - HeartMate3 HeartMate3 HeartMate3 HeartMate3 HeartMate3 HeartMate3   Flow 4.0 3.6 3.9 4.2 4.1 4.5 4   Speed 5000 5000 5000 5000 5000 5000 5000   PI 5.8 8.3 5.8 4.6 5.2 2.9 5.7   Power (Edwards) 3.4 3.4 3.4 3.4 3.4 3.5 3.4   LSL 4600 4600 4600 4600 4600 4600 4600   Pulsatility Pulse - - Intermittent pulse Intermittent pulse Intermittent pulse Intermittent pulse       Type 2 diabetes mellitus without complication  -SSI  ACHS     Essential hypertension  -Levophed off. MAP goal >65  -Continue Lisinopril 10qam/20qhs,  doxazosin 2mg BID initiated but discontinued yesterday as BP controlled with adjust lisinopril dosing. Will monitor  closely and reinitiate if elevated Bps return      Valentina Jose PA-C  Heart Transplant  [facility] - Cardiology Step[facility]

## 2022-02-13 NOTE — PROGRESS NOTES
"   02/13/22 1539        VAD 01/13/21 1211 Left ventricular assist device HeartMate 3   Placement Date/Time: 01/13/21 1211   Present Prior to Hospital Arrival?: No  Inserted by: MD  VAD Type: Left ventricular assist device  VAD Brand: HeartMate 3   Site Location Abdomen right   Site Assessment Clean;Dry;Intact   Driveline Exit Site 1   Dressing Status Clean;Dry;Intact   Dressing Intervention Sterile dressing change   Performed By RN   Dressing Change Schedule Every other day   Dressing Change Due 02/15/22   Driveline [place] in use Duarte johnson   Condition CDI     LVAD dressing change completed using sterile technique with kit DLES is a "1" with minimal drainage noted on the drain sponge. Tolerated without any complication. no redness, or tenderness noted.  Next dressing due: 2/15/2022  "

## 2022-02-13 NOTE — ASSESSMENT & PLAN NOTE
-S/P DT HM3 1/13/21  -Current speed 5000  -INR goal 2.0-3.0. Current INR subtherapeutic. Coumadin restarted as above.  -ASA stopped.   -LDH stable    Procedure: Device Interrogation Including analysis of device parameters  Current Settings: Ventricular Assist Device  Review of device function is stable/unstable stable         TXP LVAD INTERROGATIONS 2/13/2022 2/13/2022 2/13/2022 2/12/2022 2/12/2022 2/12/2022 2/12/2022   Type - HeartMate3 HeartMate3 HeartMate3 HeartMate3 HeartMate3 HeartMate3   Flow 4.0 3.6 3.9 4.2 4.1 4.5 4   Speed 5000 5000 5000 5000 5000 5000 5000   PI 5.8 8.3 5.8 4.6 5.2 2.9 5.7   Power (Edwards) 3.4 3.4 3.4 3.4 3.4 3.5 3.4   LSL 4600 4600 4600 4600 4600 4600 4600   Pulsatility Pulse - - Intermittent pulse Intermittent pulse Intermittent pulse Intermittent pulse

## 2022-02-13 NOTE — SUBJECTIVE & OBJECTIVE
Interval History: Sent for head CT yesterday evening with reports of HA different from before. CTH with no acute changes, showing overall stability with evolving subdural hematoma. HA has resolved. INR remains therapeutic at at 1.2 despite increased dosing.       Continuous Infusions:   sodium chloride 0.9% 5 mL/hr (02/08/22 0914)     Scheduled Meds:   atorvastatin  80 mg Oral Daily    baclofen  10 mg Oral TID    famotidine  20 mg Oral BID    furosemide  80 mg Oral BID    lisinopriL  10 mg Oral Daily    lisinopriL  20 mg Oral QHS    magnesium oxide  400 mg Oral BID    polyethylene glycol  17 g Oral Daily    senna-docusate 8.6-50 mg  1 tablet Oral Daily    sodium chloride  1 g Oral Daily    warfarin  5 mg Oral Daily     PRN Meds:sodium chloride, acetaminophen, dextrose 10%, glucagon (human recombinant), insulin aspart U-100, oxyCODONE-acetaminophen    Review of patient's allergies indicates:   Allergen Reactions    Pcn [penicillins] Hives     Objective:     Vital Signs (Most Recent):  Temp: 96.8 °F (36 °C) (02/13/22 1123)  Pulse: 93 (02/13/22 1123)  Resp: 18 (02/13/22 1123)  BP: 98/74 (02/13/22 0758)  SpO2: 100 % (02/13/22 1123) Vital Signs (24h Range):  Temp:  [96.8 °F (36 °C)-98.2 °F (36.8 °C)] 96.8 °F (36 °C)  Pulse:  [] 93  Resp:  [16-18] 18  SpO2:  [99 %-100 %] 100 %  BP: ()/(0-75) 98/74     Patient Vitals for the past 72 hrs (Last 3 readings):   Weight   02/13/22 0445 77.9 kg (171 lb 11.8 oz)     Body mass index is 22.05 kg/m².      Intake/Output Summary (Last 24 hours) at 2/13/2022 1150  Last data filed at 2/13/2022 0431  Gross per 24 hour   Intake 480 ml   Output 800 ml   Net -320 ml       Hemodynamic Parameters:         Physical Exam  Constitutional:       General: He is not in acute distress.     Appearance: He is well-developed.   HENT:      Head: Normocephalic and atraumatic.   Eyes:      General:         Right eye: No discharge.         Left eye: No discharge.   Cardiovascular:       Comments: Smooth VAD hum  Pulmonary:      Effort: Pulmonary effort is normal. No respiratory distress.   Abdominal:      General: There is no distension.      Palpations: Abdomen is soft.   Musculoskeletal:         General: No deformity.      Cervical back: Neck supple.   Skin:     General: Skin is warm and dry.   Neurological:      Mental Status: He is alert and oriented to person, place, and time.      Comments: Follows commands   Generalized deconditioning  Latoya   Psychiatric:         Behavior: Behavior normal. Behavior is cooperative.         Cognition and Memory: Cognition is not impaired.         Significant Labs:  CBC:  Recent Labs   Lab 02/11/22  0437 02/12/22  0240 02/13/22  0431   WBC 8.92  8.92 8.81  8.81 9.90  9.90   RBC 3.29*  3.29* 3.43*  3.43* 3.45*  3.45*   HGB 9.5*  9.5* 9.6*  9.6* 9.9*  9.9*   HCT 30.7*  30.7* 30.9*  30.9* 32.8*  32.8*     179 193  193 185  185   MCV 93  93 90  90 95  95   MCH 28.9  28.9 28.0  28.0 28.7  28.7   MCHC 30.9*  30.9* 31.1*  31.1* 30.2*  30.2*     BNP:  Recent Labs   Lab 02/07/22  0232 02/09/22  0302 02/11/22 0437   * 258* 302*     CMP:  Recent Labs   Lab 02/07/22  0232 02/08/22  0444 02/09/22  0302 02/09/22  0303 02/11/22  0437 02/12/22  0239 02/13/22  0431   *   < >  --    < > 152*  152* 111*  111* 166*  166*   CALCIUM 8.4*   < >  --    < > 9.0  9.0 9.4  9.4 9.4  9.4   ALBUMIN 2.3*  --  2.5*  --  2.6*  2.6*  --   --    PROT 6.6  --  7.1  --  7.7  7.7  --   --       < >  --    < > 134*  134* 136  136 137  137   K 4.0   < >  --    < > 3.9  3.9 4.1  4.1 4.6  4.6   CO2 24   < >  --    < > 25  25 26  26 28  28      < >  --    < > 99  99 100  100 100  100   BUN 17   < >  --    < > 23  23 21  21 29*  29*   CREATININE 0.7   < >  --    < > 0.8  0.8 0.7  0.7 0.9  0.9   ALKPHOS 119  --  125  --  137*  137*  --   --    ALT 7*  --  <5*  --  8*  8*  --   --    AST 13  --  12  --  15  15  --    --    BILITOT 1.1*  --  1.3*  --  1.4*  1.4*  --   --     < > = values in this interval not displayed.      Coagulation:   Recent Labs   Lab 02/11/22 0437 02/12/22 0239 02/13/22 0431   INR 1.2  1.2 1.2  1.2 1.2  1.2   APTT 28.6  28.6 30.0  30.0 28.5  28.5     LDH:  Recent Labs   Lab 02/11/22 0437 02/12/22 0239 02/13/22 0431    235 212     Microbiology:  Microbiology Results (last 7 days)     ** No results found for the last 168 hours. **          I have reviewed all pertinent labs within the past 24 hours.    Estimated Creatinine Clearance: 89 mL/min (based on SCr of 0.9 mg/dL).    Diagnostic Results:  I have reviewed and interpreted all pertinent imaging results/findings within the past 24 hours.

## 2022-02-14 LAB
ALBUMIN SERPL BCP-MCNC: 2.8 G/DL (ref 3.5–5.2)
ALBUMIN SERPL BCP-MCNC: 2.8 G/DL (ref 3.5–5.2)
ALP SERPL-CCNC: 128 U/L (ref 55–135)
ALP SERPL-CCNC: 128 U/L (ref 55–135)
ALT SERPL W/O P-5'-P-CCNC: 9 U/L (ref 10–44)
ALT SERPL W/O P-5'-P-CCNC: 9 U/L (ref 10–44)
ANION GAP SERPL CALC-SCNC: 11 MMOL/L (ref 8–16)
ANION GAP SERPL CALC-SCNC: 11 MMOL/L (ref 8–16)
APTT BLDCRRT: 29.1 SEC (ref 21–32)
APTT BLDCRRT: 29.1 SEC (ref 21–32)
AST SERPL-CCNC: 16 U/L (ref 10–40)
AST SERPL-CCNC: 16 U/L (ref 10–40)
BASOPHILS # BLD AUTO: 0.04 K/UL (ref 0–0.2)
BASOPHILS # BLD AUTO: 0.04 K/UL (ref 0–0.2)
BASOPHILS NFR BLD: 0.4 % (ref 0–1.9)
BASOPHILS NFR BLD: 0.4 % (ref 0–1.9)
BILIRUB DIRECT SERPL-MCNC: 0.7 MG/DL (ref 0.1–0.3)
BILIRUB DIRECT SERPL-MCNC: 0.7 MG/DL (ref 0.1–0.3)
BILIRUB SERPL-MCNC: 1.6 MG/DL (ref 0.1–1)
BILIRUB SERPL-MCNC: 1.6 MG/DL (ref 0.1–1)
BNP SERPL-MCNC: 284 PG/ML (ref 0–99)
BUN SERPL-MCNC: 24 MG/DL (ref 8–23)
BUN SERPL-MCNC: 24 MG/DL (ref 8–23)
CALCIUM SERPL-MCNC: 9.3 MG/DL (ref 8.7–10.5)
CALCIUM SERPL-MCNC: 9.3 MG/DL (ref 8.7–10.5)
CHLORIDE SERPL-SCNC: 102 MMOL/L (ref 95–110)
CHLORIDE SERPL-SCNC: 102 MMOL/L (ref 95–110)
CO2 SERPL-SCNC: 25 MMOL/L (ref 23–29)
CO2 SERPL-SCNC: 25 MMOL/L (ref 23–29)
CREAT SERPL-MCNC: 0.8 MG/DL (ref 0.5–1.4)
CREAT SERPL-MCNC: 0.8 MG/DL (ref 0.5–1.4)
CRP SERPL-MCNC: 17.8 MG/L (ref 0–8.2)
CRP SERPL-MCNC: 17.8 MG/L (ref 0–8.2)
DIFFERENTIAL METHOD: ABNORMAL
DIFFERENTIAL METHOD: ABNORMAL
EOSINOPHIL # BLD AUTO: 0.2 K/UL (ref 0–0.5)
EOSINOPHIL # BLD AUTO: 0.2 K/UL (ref 0–0.5)
EOSINOPHIL NFR BLD: 2 % (ref 0–8)
EOSINOPHIL NFR BLD: 2 % (ref 0–8)
ERYTHROCYTE [DISTWIDTH] IN BLOOD BY AUTOMATED COUNT: 15.3 % (ref 11.5–14.5)
ERYTHROCYTE [DISTWIDTH] IN BLOOD BY AUTOMATED COUNT: 15.3 % (ref 11.5–14.5)
EST. GFR  (AFRICAN AMERICAN): >60 ML/MIN/1.73 M^2
EST. GFR  (AFRICAN AMERICAN): >60 ML/MIN/1.73 M^2
EST. GFR  (NON AFRICAN AMERICAN): >60 ML/MIN/1.73 M^2
EST. GFR  (NON AFRICAN AMERICAN): >60 ML/MIN/1.73 M^2
GLUCOSE SERPL-MCNC: 126 MG/DL (ref 70–110)
GLUCOSE SERPL-MCNC: 126 MG/DL (ref 70–110)
HCT VFR BLD AUTO: 31.4 % (ref 40–54)
HCT VFR BLD AUTO: 31.4 % (ref 40–54)
HGB BLD-MCNC: 9.7 G/DL (ref 14–18)
HGB BLD-MCNC: 9.7 G/DL (ref 14–18)
IMM GRANULOCYTES # BLD AUTO: 0.02 K/UL (ref 0–0.04)
IMM GRANULOCYTES # BLD AUTO: 0.02 K/UL (ref 0–0.04)
IMM GRANULOCYTES NFR BLD AUTO: 0.2 % (ref 0–0.5)
IMM GRANULOCYTES NFR BLD AUTO: 0.2 % (ref 0–0.5)
INR PPP: 1.4 (ref 0.8–1.2)
INR PPP: 1.4 (ref 0.8–1.2)
LDH SERPL L TO P-CCNC: 219 U/L (ref 110–260)
LYMPHOCYTES # BLD AUTO: 1.8 K/UL (ref 1–4.8)
LYMPHOCYTES # BLD AUTO: 1.8 K/UL (ref 1–4.8)
LYMPHOCYTES NFR BLD: 20.4 % (ref 18–48)
LYMPHOCYTES NFR BLD: 20.4 % (ref 18–48)
MAGNESIUM SERPL-MCNC: 1.9 MG/DL (ref 1.6–2.6)
MAGNESIUM SERPL-MCNC: 1.9 MG/DL (ref 1.6–2.6)
MCH RBC QN AUTO: 29 PG (ref 27–31)
MCH RBC QN AUTO: 29 PG (ref 27–31)
MCHC RBC AUTO-ENTMCNC: 30.9 G/DL (ref 32–36)
MCHC RBC AUTO-ENTMCNC: 30.9 G/DL (ref 32–36)
MCV RBC AUTO: 94 FL (ref 82–98)
MCV RBC AUTO: 94 FL (ref 82–98)
MONOCYTES # BLD AUTO: 0.9 K/UL (ref 0.3–1)
MONOCYTES # BLD AUTO: 0.9 K/UL (ref 0.3–1)
MONOCYTES NFR BLD: 9.9 % (ref 4–15)
MONOCYTES NFR BLD: 9.9 % (ref 4–15)
NEUTROPHILS # BLD AUTO: 6.1 K/UL (ref 1.8–7.7)
NEUTROPHILS # BLD AUTO: 6.1 K/UL (ref 1.8–7.7)
NEUTROPHILS NFR BLD: 67.1 % (ref 38–73)
NEUTROPHILS NFR BLD: 67.1 % (ref 38–73)
NRBC BLD-RTO: 0 /100 WBC
NRBC BLD-RTO: 0 /100 WBC
PHOSPHATE SERPL-MCNC: 3.1 MG/DL (ref 2.7–4.5)
PHOSPHATE SERPL-MCNC: 3.1 MG/DL (ref 2.7–4.5)
PLATELET # BLD AUTO: 175 K/UL (ref 150–450)
PLATELET # BLD AUTO: 175 K/UL (ref 150–450)
PMV BLD AUTO: 11.5 FL (ref 9.2–12.9)
PMV BLD AUTO: 11.5 FL (ref 9.2–12.9)
POCT GLUCOSE: 143 MG/DL (ref 70–110)
POCT GLUCOSE: 188 MG/DL (ref 70–110)
POCT GLUCOSE: 214 MG/DL (ref 70–110)
POCT GLUCOSE: 249 MG/DL (ref 70–110)
POTASSIUM SERPL-SCNC: 4.1 MMOL/L (ref 3.5–5.1)
POTASSIUM SERPL-SCNC: 4.1 MMOL/L (ref 3.5–5.1)
PREALB SERPL-MCNC: 14 MG/DL (ref 20–43)
PREALB SERPL-MCNC: 14 MG/DL (ref 20–43)
PROT SERPL-MCNC: 8.1 G/DL (ref 6–8.4)
PROT SERPL-MCNC: 8.1 G/DL (ref 6–8.4)
PROTHROMBIN TIME: 14 SEC (ref 9–12.5)
PROTHROMBIN TIME: 14 SEC (ref 9–12.5)
RBC # BLD AUTO: 3.34 M/UL (ref 4.6–6.2)
RBC # BLD AUTO: 3.34 M/UL (ref 4.6–6.2)
SODIUM SERPL-SCNC: 138 MMOL/L (ref 136–145)
SODIUM SERPL-SCNC: 138 MMOL/L (ref 136–145)
WBC # BLD AUTO: 9.03 K/UL (ref 3.9–12.7)
WBC # BLD AUTO: 9.03 K/UL (ref 3.9–12.7)

## 2022-02-14 PROCEDURE — 99233 PR SUBSEQUENT HOSPITAL CARE,LEVL III: ICD-10-PCS | Mod: ,,, | Performed by: INTERNAL MEDICINE

## 2022-02-14 PROCEDURE — 86140 C-REACTIVE PROTEIN: CPT | Performed by: HOSPITALIST

## 2022-02-14 PROCEDURE — 80076 HEPATIC FUNCTION PANEL: CPT | Performed by: HOSPITALIST

## 2022-02-14 PROCEDURE — 80048 BASIC METABOLIC PNL TOTAL CA: CPT | Performed by: HOSPITALIST

## 2022-02-14 PROCEDURE — 93750 PR INTERROGATE VENT ASSIST DEV, IN PERSON, W PHYSICIAN ANALYSIS: ICD-10-PCS | Mod: ,,, | Performed by: INTERNAL MEDICINE

## 2022-02-14 PROCEDURE — 25000003 PHARM REV CODE 250: Performed by: PHYSICIAN ASSISTANT

## 2022-02-14 PROCEDURE — 83615 LACTATE (LD) (LDH) ENZYME: CPT | Performed by: HOSPITALIST

## 2022-02-14 PROCEDURE — 93750 INTERROGATION VAD IN PERSON: CPT | Mod: ,,, | Performed by: INTERNAL MEDICINE

## 2022-02-14 PROCEDURE — 84100 ASSAY OF PHOSPHORUS: CPT | Performed by: HOSPITALIST

## 2022-02-14 PROCEDURE — 99233 SBSQ HOSP IP/OBS HIGH 50: CPT | Mod: ,,, | Performed by: INTERNAL MEDICINE

## 2022-02-14 PROCEDURE — 99232 SBSQ HOSP IP/OBS MODERATE 35: CPT | Mod: ,,, | Performed by: NURSE PRACTITIONER

## 2022-02-14 PROCEDURE — 27000248 HC VAD-ADDITIONAL DAY

## 2022-02-14 PROCEDURE — 99233 SBSQ HOSP IP/OBS HIGH 50: CPT | Mod: ,,, | Performed by: PHYSICIAN ASSISTANT

## 2022-02-14 PROCEDURE — 25000003 PHARM REV CODE 250: Performed by: STUDENT IN AN ORGANIZED HEALTH CARE EDUCATION/TRAINING PROGRAM

## 2022-02-14 PROCEDURE — 99232 PR SUBSEQUENT HOSPITAL CARE,LEVL II: ICD-10-PCS | Mod: ,,, | Performed by: NURSE PRACTITIONER

## 2022-02-14 PROCEDURE — 85730 THROMBOPLASTIN TIME PARTIAL: CPT | Performed by: HOSPITALIST

## 2022-02-14 PROCEDURE — 63600175 PHARM REV CODE 636 W HCPCS: Performed by: HOSPITALIST

## 2022-02-14 PROCEDURE — 85025 COMPLETE CBC W/AUTO DIFF WBC: CPT | Performed by: HOSPITALIST

## 2022-02-14 PROCEDURE — 20600001 HC STEP DOWN PRIVATE ROOM

## 2022-02-14 PROCEDURE — 83735 ASSAY OF MAGNESIUM: CPT | Performed by: HOSPITALIST

## 2022-02-14 PROCEDURE — 99233 PR SUBSEQUENT HOSPITAL CARE,LEVL III: ICD-10-PCS | Mod: ,,, | Performed by: PHYSICIAN ASSISTANT

## 2022-02-14 PROCEDURE — 84134 ASSAY OF PREALBUMIN: CPT | Performed by: HOSPITALIST

## 2022-02-14 PROCEDURE — 85610 PROTHROMBIN TIME: CPT | Performed by: HOSPITALIST

## 2022-02-14 PROCEDURE — 25000003 PHARM REV CODE 250: Performed by: INTERNAL MEDICINE

## 2022-02-14 PROCEDURE — 83880 ASSAY OF NATRIURETIC PEPTIDE: CPT | Performed by: HOSPITALIST

## 2022-02-14 PROCEDURE — 25000003 PHARM REV CODE 250: Performed by: HOSPITALIST

## 2022-02-14 RX ORDER — WARFARIN 3 MG/1
6 TABLET ORAL DAILY
Status: DISCONTINUED | OUTPATIENT
Start: 2022-02-14 | End: 2022-02-15

## 2022-02-14 RX ORDER — LACTULOSE 10 G/15ML
20 SOLUTION ORAL ONCE
Status: COMPLETED | OUTPATIENT
Start: 2022-02-15 | End: 2022-02-15

## 2022-02-14 RX ADMIN — LISINOPRIL 10 MG: 10 TABLET ORAL at 09:02

## 2022-02-14 RX ADMIN — Medication 400 MG: at 09:02

## 2022-02-14 RX ADMIN — FAMOTIDINE 20 MG: 20 TABLET ORAL at 09:02

## 2022-02-14 RX ADMIN — INSULIN ASPART 2 UNITS: 100 INJECTION, SOLUTION INTRAVENOUS; SUBCUTANEOUS at 11:02

## 2022-02-14 RX ADMIN — INSULIN ASPART 1 UNITS: 100 INJECTION, SOLUTION INTRAVENOUS; SUBCUTANEOUS at 11:02

## 2022-02-14 RX ADMIN — LISINOPRIL 20 MG: 20 TABLET ORAL at 09:02

## 2022-02-14 RX ADMIN — FUROSEMIDE 80 MG: 80 TABLET ORAL at 05:02

## 2022-02-14 RX ADMIN — BACLOFEN 10 MG: 10 TABLET ORAL at 09:02

## 2022-02-14 RX ADMIN — POLYETHYLENE GLYCOL 3350 17 G: 17 POWDER, FOR SOLUTION ORAL at 09:02

## 2022-02-14 RX ADMIN — WARFARIN SODIUM 6 MG: 3 TABLET ORAL at 05:02

## 2022-02-14 RX ADMIN — SENNOSIDES AND DOCUSATE SODIUM 1 TABLET: 50; 8.6 TABLET ORAL at 09:02

## 2022-02-14 RX ADMIN — BACLOFEN 10 MG: 10 TABLET ORAL at 03:02

## 2022-02-14 RX ADMIN — ATORVASTATIN CALCIUM 80 MG: 20 TABLET, FILM COATED ORAL at 09:02

## 2022-02-14 RX ADMIN — Medication 1 G: at 09:02

## 2022-02-14 RX ADMIN — OXYCODONE HYDROCHLORIDE AND ACETAMINOPHEN 1 TABLET: 5; 325 TABLET ORAL at 11:02

## 2022-02-14 RX ADMIN — OXYCODONE HYDROCHLORIDE AND ACETAMINOPHEN 1 TABLET: 5; 325 TABLET ORAL at 04:02

## 2022-02-14 RX ADMIN — FUROSEMIDE 80 MG: 80 TABLET ORAL at 09:02

## 2022-02-14 NOTE — SUBJECTIVE & OBJECTIVE
Interval History 2/14/2022:  Patient is seen for follow-up PM&R evaluation and recommendations: Participating w/ therapy. HA still present.    HPI, Past Medical, Family, and Social History remains the same as documented in the initial encounter.    Scheduled Medications:    atorvastatin  80 mg Oral Daily    baclofen  10 mg Oral TID    famotidine  20 mg Oral BID    furosemide  80 mg Oral BID    lisinopriL  10 mg Oral Daily    lisinopriL  20 mg Oral QHS    magnesium oxide  400 mg Oral BID    polyethylene glycol  17 g Oral Daily    senna-docusate 8.6-50 mg  1 tablet Oral Daily    sodium chloride  1 g Oral Daily    warfarin  5 mg Oral Daily       Diagnostic Results: Labs: Reviewed    PRN Medications: sodium chloride, acetaminophen, dextrose 10%, glucagon (human recombinant), insulin aspart U-100, oxyCODONE-acetaminophen    Review of Systems   Constitutional: Positive for activity change.   Respiratory: Negative for shortness of breath.    Cardiovascular: Negative for chest pain.   Musculoskeletal: Positive for gait problem.   Neurological: Positive for weakness and headaches.   Psychiatric/Behavioral: Negative for confusion.     Objective:     Vital Signs (Most Recent):  Temp: 97.5 °F (36.4 °C) (02/14/22 0753)  Pulse: 109 (02/14/22 0753)  Resp: 18 (02/14/22 1128)  BP: (!) 78/0 (02/14/22 1118)  SpO2: 100 % (02/14/22 0753)    Vital Signs (24h Range):  Temp:  [96.6 °F (35.9 °C)-97.9 °F (36.6 °C)] 97.5 °F (36.4 °C)  Pulse:  [] 109  Resp:  [14-18] 18  SpO2:  [95 %-100 %] 100 %  BP: ()/(0-73) 78/0     Physical Exam  Constitutional:       General: He is not in acute distress.     Appearance: He is well-developed.   HENT:      Head: Normocephalic and atraumatic.   Eyes:      General:         Right eye: No discharge.         Left eye: No discharge.   Cardiovascular:      Comments: LVAD   Pulmonary:      Effort: Pulmonary effort is normal. No respiratory distress.   Abdominal:      General: There is no  distension.      Palpations: Abdomen is soft.   Musculoskeletal:         General: No deformity.      Cervical back: Neck supple.   Skin:     General: Skin is warm and dry.   Neurological:      Mental Status: He is alert and oriented to person, place, and time.      Comments: Follows commands   Generalized deconditioning  Latoya   Psychiatric:         Behavior: Behavior normal. Behavior is cooperative.         Cognition and Memory: Cognition is not impaired.       NEUROLOGICAL EXAMINATION:     MENTAL STATUS   Oriented to person, place, and time.

## 2022-02-14 NOTE — ASSESSMENT & PLAN NOTE
-Repeat imaging revealed CTA with new cisternal SAH, negative aneurysm or vascular malformation on CTA, likely nonaneurysmal perimesencephalic SAH per NSGY  - Repeat CHT 2/7 stable  -repeat CTH on 2/10 with slight resolution per HTS  -repeat CTH on 2/13 with no acute changes, showing overall stability with evolving SDH HTS    See hospital course for functional, cognitive/speech/language, and nutrition/swallow status.      Recommendations  -  Monitor sleep disturbances and establish consistent sleep-wake cycle  -  Environmental modifications to limit agitation/confusion   -  Reorient patient to person, place, time, and situation on each encounter  -  Avoid restraints  -  May benefit from 24/7 supervision  -  Avoid/limit medications that can worsen delirium (benzodiazepines, antihistamines, anticholinergics, hypnotics, opiates)  -  Encourage mobility, OOB in chair, and early ambulation as appropriate  -  PT/OT evaluate and treat  -  SLP speech and cognitive evaluate and treat  -  Monitor for bowel and bladder dysfunction  -  Monitor for and prevent skin breakdown and pressure ulcers  · Early mobility, repositioning/weight shifting every 20-30 minutes when sitting, turn patient every 2 hours, proper mattress/overlay and chair cushioning, pressure relief/heel protector boots

## 2022-02-14 NOTE — ASSESSMENT & PLAN NOTE
-Patient had a traumatic subdural, subarachnoid hemorrhage after fall on 1/27. Currently no focal deficits  -NCC signed off. NSGY following peripherally.  -INR subtherapeutic. Coumadin restarted 2/5 at low dose with plans to watch INR rise slowly. PharmD to adjust dosing. Goal INR 1.8-2.5  -Plan for repeat CTH when INR therapeutic.  -Goal Na+ > 135, slowly trickled down days after stopping hypertonic saline. Salt tabs given prn. Has stabilized  -Keppra started as AED. EEG negative for seizures.  -Transcranial dopplers done 1/31 without evidence of vasospasm.  -Continue q2h neurochecks.

## 2022-02-14 NOTE — PT/OT/SLP PROGRESS
Occupational Therapy      Patient Name:  Rocco France   MRN:  [id number]    Patient not seen today secondary to eating late lunch upon OT arrival. OT unable to return this date  . Will follow-up 2/15/22.    2/14/2022

## 2022-02-14 NOTE — PROGRESS NOTES
[facility]  Physical Medicine & Rehab  Progress Note    Patient Name: Rocco France  MRN: [id number]  Admission Date: 1/27/2022  Length of Stay: 18 days  Attending Physician: Pete Baker MD    Subjective:     Principal Problem:Bilateral subdural hematomas    Hospital Course:   2.7: PT-BM and sit to stand CGA. Ambulated 6 ft fwd/bwd Min.   02/08/2022: OT-Bed mobility SBA. Static sitting EOB SBS with mild dizziness. Sit to stand and transfers CGA.  Ambulated 8 FT CGA.  UBD/toilet CGA and F/G Mod (I).   2/9: No therapy.   2/10: BM SBA. Sit to stand CGA. Ambulated 22 ft Min.   2/13: BM SBA. Sit to stand and trsx CGA. Ambulated 25 ft CGA.     Interval History 2/14/2022:  Patient is seen for follow-up PM&R evaluation and recommendations: Participating w/ therapy. HA still present.    HPI, Past Medical, Family, and Social History remains the same as documented in the initial encounter.    Scheduled Medications:    atorvastatin  80 mg Oral Daily    baclofen  10 mg Oral TID    famotidine  20 mg Oral BID    furosemide  80 mg Oral BID    lisinopriL  10 mg Oral Daily    lisinopriL  20 mg Oral QHS    magnesium oxide  400 mg Oral BID    polyethylene glycol  17 g Oral Daily    senna-docusate 8.6-50 mg  1 tablet Oral Daily    sodium chloride  1 g Oral Daily    warfarin  5 mg Oral Daily       Diagnostic Results: Labs: Reviewed    PRN Medications: sodium chloride, acetaminophen, dextrose 10%, glucagon (human recombinant), insulin aspart U-100, oxyCODONE-acetaminophen    Review of Systems   Constitutional: Positive for activity change.   Respiratory: Negative for shortness of breath.    Cardiovascular: Negative for chest pain.   Musculoskeletal: Positive for gait problem.   Neurological: Positive for weakness and headaches.   Psychiatric/Behavioral: Negative for confusion.     Objective:     Vital Signs (Most Recent):  Temp: 97.5 °F (36.4 °C) (02/14/22 0753)  Pulse: 109 (02/14/22 0753)  Resp:  18 (02/14/22 1128)  BP: (!) 78/0 (02/14/22 1118)  SpO2: 100 % (02/14/22 0753)    Vital Signs (24h Range):  Temp:  [96.6 °F (35.9 °C)-97.9 °F (36.6 °C)] 97.5 °F (36.4 °C)  Pulse:  [] 109  Resp:  [14-18] 18  SpO2:  [95 %-100 %] 100 %  BP: ()/(0-73) 78/0     Physical Exam  Constitutional:       General: He is not in acute distress.     Appearance: He is well-developed.   HENT:      Head: Normocephalic and atraumatic.   Eyes:      General:         Right eye: No discharge.         Left eye: No discharge.   Cardiovascular:      Comments: LVAD   Pulmonary:      Effort: Pulmonary effort is normal. No respiratory distress.   Abdominal:      General: There is no distension.      Palpations: Abdomen is soft.   Musculoskeletal:         General: No deformity.      Cervical back: Neck supple.   Skin:     General: Skin is warm and dry.   Neurological:      Mental Status: He is alert and oriented to person, place, and time.      Comments: Follows commands   Generalized deconditioning  Latoya   Psychiatric:         Behavior: Behavior normal. Behavior is cooperative.         Cognition and Memory: Cognition is not impaired.     Assessment/Plan:      * Bilateral subdural hematomas  -Repeat imaging revealed CTA with new cisternal SAH, negative aneurysm or vascular malformation on CTA, likely nonaneurysmal perimesencephalic SAH per NSGY  - Repeat CHT 2/7 stable  -repeat CTH on 2/10 with slight resolution per HTS  -repeat CTH on 2/13 with no acute changes, showing overall stability with evolving SDH HTS    See hospital course for functional, cognitive/speech/language, and nutrition/swallow status.      Recommendations  -  Monitor sleep disturbances and establish consistent sleep-wake cycle  -  Environmental modifications to limit agitation/confusion   -  Reorient patient to person, place, time, and situation on each encounter  -  Avoid restraints  -  May benefit from 24/7 supervision  -  Avoid/limit medications that can worsen  delirium (benzodiazepines, antihistamines, anticholinergics, hypnotics, opiates)  -  Encourage mobility, OOB in chair, and early ambulation as appropriate  -  PT/OT evaluate and treat  -  SLP speech and cognitive evaluate and treat  -  Monitor for bowel and bladder dysfunction  -  Monitor for and prevent skin breakdown and pressure ulcers  · Early mobility, repositioning/weight shifting every 20-30 minutes when sitting, turn patient every 2 hours, proper mattress/overlay and chair cushioning, pressure relief/heel protector boots    Acute respiratory failure requiring reintubation  -extubated on 1/28, now on RA    LVAD (left ventricular assist device) present  -s/p LVAD 1/13/21  -INR goal 2.0-3.0  -INR subtherapeutic, primary team managing    PM&R Recommendation:     At this time, the PM&R team has reviewed this patient's ongoing medical case including inpatient diagnosis, medical history, clinical examination, labs, vitals, current social and functional history to provide the post-acute recommendation as follows:     RECOMMENDATIONS: Inpatient rehabilitation due to good motivation/participation with therapies and good potential for recovery    MEDICAL STABILITY:     At this time, barriers for post-acute rehab admission include: INR subtherapeutic and pt requesting HH therapy     We will continue to follow.      America Ortega NP  Department of Physical Medicine & Rehab   [facility]

## 2022-02-14 NOTE — PROGRESS NOTES
02/14/2022  Rafal Cullen    Current provider:  Pete Baker MD    Device interrogation:  TXP LVAD INTERROGATIONS 2/14/2022 2/14/2022 2/13/2022 2/13/2022 2/13/2022 2/13/2022 2/13/2022   Type HeartMate3 HeartMate3 HeartMate3 HeartMate3 - - HeartMate3   Flow 3.7 3.9 3.8 3.9 4.1 4.0 3.6   Speed 5000 5000 5050 5000 5000 5000 5000   PI 6.7 5.4 7 6.5 5.8 5.8 8.3   Power (Edwards) 3.3 3.3 3.4 3.4 3.3 3.4 3.4   LSL 4600 - - 4600 4600 4600 4600   Pulsatility - - - Intermittent pulse Pulse Pulse -          Rounded on Rocco France to ensure all mechanical assist device settings (IABP or VAD) were appropriate and all parameters were within limits.  I was able to ensure all back up equipment was present, the staff had no issues, and the Perfusion Department daily rounding was complete.      For implantable VADs: Interrogation of Ventricular assist device was performed with analysis of device parameters and review of device function. I have personally reviewed the interrogation findings and agree with findings as stated.     In emergency, the nursing units have been notified to contact the perfusion department either by:  Calling [phone] from 630am to 4pm Mon thru Fri, utilizing the On-Call Finder functionality of Epic and searching for Perfusion, or by contacting the hospital  from 4pm to 630am and on weekends and asking to speak with the perfusionist on call.    10:46 AM

## 2022-02-14 NOTE — PROGRESS NOTES
[facility]  Heart Transplant  Progress Note    Patient Name: Rocco France  MRN: [id number]  Admission Date: 1/27/2022  Hospital Length of Stay: 18 days  Attending Physician: Pete Baker MD  Primary Care Provider: Teresa Mendoza NP  Principal Problem:Bilateral subdural hematomas    Subjective:     Interval History: No events overnight. Continues with HAs, unchanged in nature and relieved with prn meds. INR 1.4 today. Will repeat echo today to eval RV function as considering initiation of BB.      Continuous Infusions:   sodium chloride 0.9% 5 mL/hr (02/08/22 0914)     Scheduled Meds:   atorvastatin  80 mg Oral Daily    baclofen  10 mg Oral TID    famotidine  20 mg Oral BID    furosemide  80 mg Oral BID    lisinopriL  10 mg Oral Daily    lisinopriL  20 mg Oral QHS    magnesium oxide  400 mg Oral BID    polyethylene glycol  17 g Oral Daily    senna-docusate 8.6-50 mg  1 tablet Oral Daily    sodium chloride  1 g Oral Daily    warfarin  6 mg Oral Daily     PRN Meds:sodium chloride, acetaminophen, dextrose 10%, glucagon (human recombinant), insulin aspart U-100, oxyCODONE-acetaminophen    Review of patient's allergies indicates:   Allergen Reactions    Pcn [penicillins] Hives     Objective:     Vital Signs (Most Recent):  Temp: 96.9 °F (36.1 °C) (02/14/22 1159)  Pulse: 110 (02/14/22 1200)  Resp: 20 (02/14/22 1159)  BP: 95/75 (02/14/22 1159)  SpO2: 100 % (02/14/22 0753) Vital Signs (24h Range):  Temp:  [96.6 °F (35.9 °C)-97.9 °F (36.6 °C)] 96.9 °F (36.1 °C)  Pulse:  [] 110  Resp:  [14-20] 20  SpO2:  [95 %-100 %] 100 %  BP: ()/(0-75) 95/75     Patient Vitals for the past 72 hrs (Last 3 readings):   Weight   02/14/22 0440 77.9 kg (171 lb 11.8 oz)   02/13/22 0445 77.9 kg (171 lb 11.8 oz)     Body mass index is 22.05 kg/m².      Intake/Output Summary (Last 24 hours) at 2/14/2022 1303  Last data filed at 2/14/2022 1100  Gross per 24 hour   Intake 1310 ml   Output 1100 ml    Net 210 ml       Hemodynamic Parameters:         Physical Exam  Constitutional:       General: He is not in acute distress.     Appearance: He is well-developed.   HENT:      Head: Normocephalic and atraumatic.   Eyes:      General:         Right eye: No discharge.         Left eye: No discharge.   Cardiovascular:      Comments: Smooth VAD hum  Pulmonary:      Effort: Pulmonary effort is normal. No respiratory distress.   Abdominal:      General: There is no distension.      Palpations: Abdomen is soft.   Musculoskeletal:         General: No deformity.      Cervical back: Neck supple.   Skin:     General: Skin is warm and dry.   Neurological:      General: No focal deficit present.      Mental Status: He is alert and oriented to person, place, and time.      Comments: Follows commands   Generalized deconditioning     Psychiatric:         Behavior: Behavior normal. Behavior is cooperative.         Thought Content: Thought content normal.         Cognition and Memory: Cognition is not impaired.         Significant Labs:  CBC:  Recent Labs   Lab 02/12/22  0240 02/13/22  0431 02/14/22 0448   WBC 8.81  8.81 9.90  9.90 9.03  9.03   RBC 3.43*  3.43* 3.45*  3.45* 3.34*  3.34*   HGB 9.6*  9.6* 9.9*  9.9* 9.7*  9.7*   HCT 30.9*  30.9* 32.8*  32.8* 31.4*  31.4*     193 185  185 175  175   MCV 90  90 95  95 94  94   MCH 28.0  28.0 28.7  28.7 29.0  29.0   MCHC 31.1*  31.1* 30.2*  30.2* 30.9*  30.9*     BNP:  Recent Labs   Lab 02/09/22  0302 02/11/22  0437 02/14/22 0448   * 302* 284*     CMP:  Recent Labs   Lab 02/09/22  0302 02/09/22  0303 02/11/22  0437 02/11/22  0437 02/12/22  0239 02/13/22  0431 02/14/22 0448   GLU  --    < > 152*  152*   < > 111*  111* 166*  166* 126*  126*   CALCIUM  --    < > 9.0  9.0   < > 9.4  9.4 9.4  9.4 9.3  9.3   ALBUMIN 2.5*  --  2.6*  2.6*  --   --   --  2.8*  2.8*   PROT 7.1  --  7.7  7.7  --   --   --  8.1  8.1   NA  --    < > 134*  134*   <  > 136  136 137  137 138  138   K  --    < > 3.9  3.9   < > 4.1  4.1 4.6  4.6 4.1  4.1   CO2  --    < > 25  25   < > 26  26 28  28 25  25   CL  --    < > 99  99   < > 100  100 100  100 102  102   BUN  --    < > 23  23   < > 21  21 29*  29* 24*  24*   CREATININE  --    < > 0.8  0.8   < > 0.7  0.7 0.9  0.9 0.8  0.8   ALKPHOS 125  --  137*  137*  --   --   --  128  128   ALT <5*  --  8*  8*  --   --   --  9*  9*   AST 12  --  15  15  --   --   --  16  16   BILITOT 1.3*  --  1.4*  1.4*  --   --   --  1.6*  1.6*    < > = values in this interval not displayed.      Coagulation:   Recent Labs   Lab 02/12/22 0239 02/13/22  0431 02/14/22  0448   INR 1.2  1.2 1.2  1.2 1.4*  1.4*   APTT 30.0  30.0 28.5  28.5 29.1  29.1     LDH:  Recent Labs   Lab 02/12/22 0239 02/13/22  0431 02/14/22  0448    212 219     Microbiology:  Microbiology Results (last 7 days)     ** No results found for the last 168 hours. **          I have reviewed all pertinent labs within the past 24 hours.    Estimated Creatinine Clearance: 100.1 mL/min (based on SCr of 0.8 mg/dL).    Diagnostic Results:  I have reviewed and interpreted all pertinent imaging results/findings within the past 24 hours.    Assessment and Plan:     67 yo BM with stage D CHF due to NICMP s/p HM3, s/p ICD, HTN, HLD, T2DM was transferred from outside hospital emergency room after he was found to have subdural hematoma/subarachnoid hemorrhage.  Patient presented to the ER after he was found to have a syncopal event.  Also patient had respiratory distress on arrival for which he was intubated.  Patient was transferred here for further evaluation.  On arrival patient is intubated and is on propofol.  Neurosurgery and Neuro Critical Care were immediately informed.  Neurosurgery recommended to repeat CT after reviewing the prior CT done in the ER in [place].  INR on arrival is 1.6.  He also had a low flow with a increased PI around 4:00 p.m.  this evening.  According to wife patient was complaining of headache for the last 2 weeks..  [place] went [place] post office this [place] and she does not know what exactly happened..  ICD was interrogated and did not show any VT/VF.       * Bilateral subdural hematomas  -Patient had a traumatic subdural, subarachnoid hemorrhage after fall on 1/27. Currently no focal deficits  -NCC signed off. NSGY following peripherally.  -INR subtherapeutic. Coumadin restarted 2/5 at low dose with plans to watch INR rise slowly. PharmD to adjust dosing. Goal INR 1.8-2.5  -Plan for repeat CTH when INR therapeutic.  -Goal Na+ > 135, slowly trickled down days after stopping hypertonic saline. Salt tabs given prn. Has stabilized  -Keppra started as AED. EEG negative for seizures.  -Transcranial dopplers done 1/31 without evidence of vasospasm.  -Continue q2h neurochecks.    Acute respiratory failure requiring reintubation  - Extubated 1/28 and on RA  - Tx'd with remdesivir which was since discontinued. Asymptomatic from covid perspective    LVAD (left ventricular assist device) present  -S/P DT HM3 1/13/21  -Current speed 5000  -INR goal lowered 1.8-2.5 for now. Current INR subtherapeutic. Coumadin restarted as above.  -ASA stopped.   -LDH stable    Procedure: Device Interrogation Including analysis of device parameters  Current Settings: Ventricular Assist Device  Review of device function is stable/unstable stable         TXP LVAD INTERROGATIONS 2/14/2022 2/14/2022 2/14/2022 2/13/2022 2/13/2022 2/13/2022 2/13/2022   Type HeartMate3 HeartMate3 HeartMate3 HeartMate3 HeartMate3 - -   Flow 4.1 3.7 3.9 3.8 3.9 4.1 4.0   Speed 5000 5000 5000 5050 5000 5000 5000   PI 5.5 6.7 5.4 7 6.5 5.8 5.8   Power (Edwards) 3.4 3.3 3.3 3.4 3.4 3.3 3.4   LSL 4600 4600 - - 4600 4600 4600   Pulsatility - - - - Intermittent pulse Pulse Pulse       Type 2 diabetes mellitus without complication  -SSI  ACHS     Essential hypertension  -Levophed off. MAP goal  >65  -Continue Lisinopril 10qam/20qhs,  doxazosin 2mg BID initiated but discontinued  as BP controlled with adjusted lisinopril dosing. Will monitor closely and reinitiate if elevated Bps return      Valentina Jose PA-C  Heart Transplant  [facility]

## 2022-02-14 NOTE — ASSESSMENT & PLAN NOTE
-Levophed off. MAP goal >65  -Continue Lisinopril 10qam/20qhs,  doxazosin 2mg BID initiated but discontinued  as BP controlled with adjusted lisinopril dosing. Will monitor closely and reinitiate if elevated Bps return

## 2022-02-14 NOTE — PLAN OF CARE
Plan of care discussed with patient.  Patient ambulating independently, fall precautions in place. Patient Aox4 and VS stable. LVAD DP and numbers WNL, smooth LVAD hum. Patient has complaints of a mild headache, treated with percocet PRN. MD notified of HA, says this is normal for patient and ordered to monitor for any neuro changes. Discussed medications and care. Patient has no questions at this time. Will continue to monitor.

## 2022-02-14 NOTE — PLAN OF CARE
Pt maintained free from falls/ trauma/ injuries and skin breakdown. Pt received Percocet this am for headache of 4/10. Denied SOB or chest pain through the shift. Pt is A and O x4 through the shift except when he was waken up for VS this am, he was disoriented to place and situation. Easy to reoriented and follow commands. Team made aware. Pt is free from LVAD alarm and drsg due to be changed on 2/15/22. . Insulin coverage given. Plan of care reviewed. Pt verbalized understanding. All questions and concerns addressed. Will continue to monitor.

## 2022-02-14 NOTE — SUBJECTIVE & OBJECTIVE
Interval History: No events overnight. Continues with HAs, unchanged in nature and relieved with prn meds. INR 1.4 today. Will repeat echo today to eval RV function as considering initiation of BB.      Continuous Infusions:   sodium chloride 0.9% 5 mL/hr (02/08/22 0914)     Scheduled Meds:   atorvastatin  80 mg Oral Daily    baclofen  10 mg Oral TID    famotidine  20 mg Oral BID    furosemide  80 mg Oral BID    lisinopriL  10 mg Oral Daily    lisinopriL  20 mg Oral QHS    magnesium oxide  400 mg Oral BID    polyethylene glycol  17 g Oral Daily    senna-docusate 8.6-50 mg  1 tablet Oral Daily    sodium chloride  1 g Oral Daily    warfarin  6 mg Oral Daily     PRN Meds:sodium chloride, acetaminophen, dextrose 10%, glucagon (human recombinant), insulin aspart U-100, oxyCODONE-acetaminophen    Review of patient's allergies indicates:   Allergen Reactions    Pcn [penicillins] Hives     Objective:     Vital Signs (Most Recent):  Temp: 96.9 °F (36.1 °C) (02/14/22 1159)  Pulse: 110 (02/14/22 1200)  Resp: 20 (02/14/22 1159)  BP: 95/75 (02/14/22 1159)  SpO2: 100 % (02/14/22 0753) Vital Signs (24h Range):  Temp:  [96.6 °F (35.9 °C)-97.9 °F (36.6 °C)] 96.9 °F (36.1 °C)  Pulse:  [] 110  Resp:  [14-20] 20  SpO2:  [95 %-100 %] 100 %  BP: ()/(0-75) 95/75     Patient Vitals for the past 72 hrs (Last 3 readings):   Weight   02/14/22 0440 77.9 kg (171 lb 11.8 oz)   02/13/22 0445 77.9 kg (171 lb 11.8 oz)     Body mass index is 22.05 kg/m².      Intake/Output Summary (Last 24 hours) at 2/14/2022 1303  Last data filed at 2/14/2022 1100  Gross per 24 hour   Intake 1310 ml   Output 1100 ml   Net 210 ml       Hemodynamic Parameters:         Physical Exam  Constitutional:       General: He is not in acute distress.     Appearance: He is well-developed.   HENT:      Head: Normocephalic and atraumatic.   Eyes:      General:         Right eye: No discharge.         Left eye: No discharge.   Cardiovascular:       Comments: Smooth VAD hum  Pulmonary:      Effort: Pulmonary effort is normal. No respiratory distress.   Abdominal:      General: There is no distension.      Palpations: Abdomen is soft.   Musculoskeletal:         General: No deformity.      Cervical back: Neck supple.   Skin:     General: Skin is warm and dry.   Neurological:      General: No focal deficit present.      Mental Status: He is alert and oriented to person, place, and time.      Comments: Follows commands   Generalized deconditioning     Psychiatric:         Behavior: Behavior normal. Behavior is cooperative.         Thought Content: Thought content normal.         Cognition and Memory: Cognition is not impaired.         Significant Labs:  CBC:  Recent Labs   Lab 02/12/22  0240 02/13/22  0431 02/14/22 0448   WBC 8.81  8.81 9.90  9.90 9.03  9.03   RBC 3.43*  3.43* 3.45*  3.45* 3.34*  3.34*   HGB 9.6*  9.6* 9.9*  9.9* 9.7*  9.7*   HCT 30.9*  30.9* 32.8*  32.8* 31.4*  31.4*     193 185  185 175  175   MCV 90  90 95  95 94  94   MCH 28.0  28.0 28.7  28.7 29.0  29.0   MCHC 31.1*  31.1* 30.2*  30.2* 30.9*  30.9*     BNP:  Recent Labs   Lab 02/09/22  0302 02/11/22  0437 02/14/22  0448   * 302* 284*     CMP:  Recent Labs   Lab 02/09/22  0302 02/09/22  0303 02/11/22  0437 02/11/22  0437 02/12/22  0239 02/13/22  0431 02/14/22  0448   GLU  --    < > 152*  152*   < > 111*  111* 166*  166* 126*  126*   CALCIUM  --    < > 9.0  9.0   < > 9.4  9.4 9.4  9.4 9.3  9.3   ALBUMIN 2.5*  --  2.6*  2.6*  --   --   --  2.8*  2.8*   PROT 7.1  --  7.7  7.7  --   --   --  8.1  8.1   NA  --    < > 134*  134*   < > 136  136 137  137 138  138   K  --    < > 3.9  3.9   < > 4.1  4.1 4.6  4.6 4.1  4.1   CO2  --    < > 25  25   < > 26  26 28  28 25  25   CL  --    < > 99  99   < > 100  100 100  100 102  102   BUN  --    < > 23  23   < > 21  21 29*  29* 24*  24*   CREATININE  --    < > 0.8  0.8   < > 0.7  0.7 0.9   0.9 0.8  0.8   ALKPHOS 125  --  137*  137*  --   --   --  128  128   ALT <5*  --  8*  8*  --   --   --  9*  9*   AST 12  --  15  15  --   --   --  16  16   BILITOT 1.3*  --  1.4*  1.4*  --   --   --  1.6*  1.6*    < > = values in this interval not displayed.      Coagulation:   Recent Labs   Lab 02/12/22 0239 02/13/22 0431 02/14/22 0448   INR 1.2  1.2 1.2  1.2 1.4*  1.4*   APTT 30.0  30.0 28.5  28.5 29.1  29.1     LDH:  Recent Labs   Lab 02/12/22 0239 02/13/22 0431 02/14/22 0448    212 219     Microbiology:  Microbiology Results (last 7 days)     ** No results found for the last 168 hours. **          I have reviewed all pertinent labs within the past 24 hours.    Estimated Creatinine Clearance: 100.1 mL/min (based on SCr of 0.8 mg/dL).    Diagnostic Results:  I have reviewed and interpreted all pertinent imaging results/findings within the past 24 hours.

## 2022-02-14 NOTE — ASSESSMENT & PLAN NOTE
-S/P DT HM3 1/13/21  -Current speed 5000  -INR goal lowered 1.8-2.5 for now. Current INR subtherapeutic. Coumadin restarted as above.  -ASA stopped.   -LDH stable    Procedure: Device Interrogation Including analysis of device parameters  Current Settings: Ventricular Assist Device  Review of device function is stable/unstable stable         TXP LVAD INTERROGATIONS 2/14/2022 2/14/2022 2/14/2022 2/13/2022 2/13/2022 2/13/2022 2/13/2022   Type HeartMate3 HeartMate3 HeartMate3 HeartMate3 HeartMate3 - -   Flow 4.1 3.7 3.9 3.8 3.9 4.1 4.0   Speed 5000 5000 5000 5050 5000 5000 5000   PI 5.5 6.7 5.4 7 6.5 5.8 5.8   Power (Edwards) 3.4 3.3 3.3 3.4 3.4 3.3 3.4   LSL 4600 4600 - - 4600 4600 4600   Pulsatility - - - - Intermittent pulse Pulse Pulse

## 2022-02-15 LAB
ANION GAP SERPL CALC-SCNC: 10 MMOL/L (ref 8–16)
ANION GAP SERPL CALC-SCNC: 10 MMOL/L (ref 8–16)
APTT BLDCRRT: 31.3 SEC (ref 21–32)
APTT BLDCRRT: 31.3 SEC (ref 21–32)
ASCENDING AORTA: 3.29 CM
BASOPHILS # BLD AUTO: 0.03 K/UL (ref 0–0.2)
BASOPHILS # BLD AUTO: 0.03 K/UL (ref 0–0.2)
BASOPHILS NFR BLD: 0.4 % (ref 0–1.9)
BASOPHILS NFR BLD: 0.4 % (ref 0–1.9)
BSA FOR ECHO PROCEDURE: 2.01 M2
BUN SERPL-MCNC: 26 MG/DL (ref 8–23)
BUN SERPL-MCNC: 26 MG/DL (ref 8–23)
CALCIUM SERPL-MCNC: 9.2 MG/DL (ref 8.7–10.5)
CALCIUM SERPL-MCNC: 9.2 MG/DL (ref 8.7–10.5)
CHLORIDE SERPL-SCNC: 102 MMOL/L (ref 95–110)
CHLORIDE SERPL-SCNC: 102 MMOL/L (ref 95–110)
CO2 SERPL-SCNC: 26 MMOL/L (ref 23–29)
CO2 SERPL-SCNC: 26 MMOL/L (ref 23–29)
CREAT SERPL-MCNC: 0.8 MG/DL (ref 0.5–1.4)
CREAT SERPL-MCNC: 0.8 MG/DL (ref 0.5–1.4)
CV ECHO LV RWT: 0.14 CM
DIFFERENTIAL METHOD: ABNORMAL
DIFFERENTIAL METHOD: ABNORMAL
DOP CALC LVOT AREA: 4.5 CM2
DOP CALC LVOT DIAMETER: 2.39 CM
E/E' RATIO: 10.75 M/S
ECHO LV POSTERIOR WALL: 0.47 CM (ref 0.6–1.1)
EJECTION FRACTION: 15 %
EOSINOPHIL # BLD AUTO: 0.2 K/UL (ref 0–0.5)
EOSINOPHIL # BLD AUTO: 0.2 K/UL (ref 0–0.5)
EOSINOPHIL NFR BLD: 2.6 % (ref 0–8)
EOSINOPHIL NFR BLD: 2.6 % (ref 0–8)
ERYTHROCYTE [DISTWIDTH] IN BLOOD BY AUTOMATED COUNT: 15.3 % (ref 11.5–14.5)
ERYTHROCYTE [DISTWIDTH] IN BLOOD BY AUTOMATED COUNT: 15.3 % (ref 11.5–14.5)
EST. GFR  (AFRICAN AMERICAN): >60 ML/MIN/1.73 M^2
EST. GFR  (AFRICAN AMERICAN): >60 ML/MIN/1.73 M^2
EST. GFR  (NON AFRICAN AMERICAN): >60 ML/MIN/1.73 M^2
EST. GFR  (NON AFRICAN AMERICAN): >60 ML/MIN/1.73 M^2
FRACTIONAL SHORTENING: 6 % (ref 28–44)
GLUCOSE SERPL-MCNC: 130 MG/DL (ref 70–110)
GLUCOSE SERPL-MCNC: 130 MG/DL (ref 70–110)
HCT VFR BLD AUTO: 28.2 % (ref 40–54)
HCT VFR BLD AUTO: 28.2 % (ref 40–54)
HGB BLD-MCNC: 9 G/DL (ref 14–18)
HGB BLD-MCNC: 9 G/DL (ref 14–18)
IMM GRANULOCYTES # BLD AUTO: 0.02 K/UL (ref 0–0.04)
IMM GRANULOCYTES # BLD AUTO: 0.02 K/UL (ref 0–0.04)
IMM GRANULOCYTES NFR BLD AUTO: 0.3 % (ref 0–0.5)
IMM GRANULOCYTES NFR BLD AUTO: 0.3 % (ref 0–0.5)
INR PPP: 1.7 (ref 0.8–1.2)
INR PPP: 1.7 (ref 0.8–1.2)
INTERVENTRICULAR SEPTUM: 0.55 CM (ref 0.6–1.1)
IVRT: 122.74 MSEC
LA MAJOR: 5.54 CM
LA MINOR: 5.52 CM
LA WIDTH: 4.16 CM
LDH SERPL L TO P-CCNC: 189 U/L (ref 110–260)
LEFT ATRIUM SIZE: 3.33 CM
LEFT ATRIUM VOLUME INDEX MOD: 32.7 ML/M2
LEFT ATRIUM VOLUME INDEX: 32.1 ML/M2
LEFT ATRIUM VOLUME MOD: 66.3 CM3
LEFT ATRIUM VOLUME: 65.11 CM3
LEFT INTERNAL DIMENSION IN SYSTOLE: 6.3 CM (ref 2.1–4)
LEFT VENTRICLE DIASTOLIC VOLUME INDEX: 116.32 ML/M2
LEFT VENTRICLE DIASTOLIC VOLUME: 236.12 ML
LEFT VENTRICLE MASS INDEX: 66 G/M2
LEFT VENTRICLE SYSTOLIC VOLUME INDEX: 99 ML/M2
LEFT VENTRICLE SYSTOLIC VOLUME: 201.03 ML
LEFT VENTRICULAR INTERNAL DIMENSION IN DIASTOLE: 6.7 CM (ref 3.5–6)
LEFT VENTRICULAR MASS: 133.17 G
LV LATERAL E/E' RATIO: 9.56 M/S
LV SEPTAL E/E' RATIO: 12.29 M/S
LYMPHOCYTES # BLD AUTO: 1.6 K/UL (ref 1–4.8)
LYMPHOCYTES # BLD AUTO: 1.6 K/UL (ref 1–4.8)
LYMPHOCYTES NFR BLD: 19.9 % (ref 18–48)
LYMPHOCYTES NFR BLD: 19.9 % (ref 18–48)
MAGNESIUM SERPL-MCNC: 1.9 MG/DL (ref 1.6–2.6)
MAGNESIUM SERPL-MCNC: 1.9 MG/DL (ref 1.6–2.6)
MCH RBC QN AUTO: 29.3 PG (ref 27–31)
MCH RBC QN AUTO: 29.3 PG (ref 27–31)
MCHC RBC AUTO-ENTMCNC: 31.9 G/DL (ref 32–36)
MCHC RBC AUTO-ENTMCNC: 31.9 G/DL (ref 32–36)
MCV RBC AUTO: 92 FL (ref 82–98)
MCV RBC AUTO: 92 FL (ref 82–98)
MONOCYTES # BLD AUTO: 0.7 K/UL (ref 0.3–1)
MONOCYTES # BLD AUTO: 0.7 K/UL (ref 0.3–1)
MONOCYTES NFR BLD: 9.3 % (ref 4–15)
MONOCYTES NFR BLD: 9.3 % (ref 4–15)
MV A" WAVE DURATION": 5.14 MSEC
MV PEAK E VEL: 0.86 M/S
NEUTROPHILS # BLD AUTO: 5.3 K/UL (ref 1.8–7.7)
NEUTROPHILS # BLD AUTO: 5.3 K/UL (ref 1.8–7.7)
NEUTROPHILS NFR BLD: 67.5 % (ref 38–73)
NEUTROPHILS NFR BLD: 67.5 % (ref 38–73)
NRBC BLD-RTO: 0 /100 WBC
NRBC BLD-RTO: 0 /100 WBC
PHOSPHATE SERPL-MCNC: 3.2 MG/DL (ref 2.7–4.5)
PHOSPHATE SERPL-MCNC: 3.2 MG/DL (ref 2.7–4.5)
PISA TR MAX VEL: 2.07 M/S
PLATELET # BLD AUTO: 158 K/UL (ref 150–450)
PLATELET # BLD AUTO: 158 K/UL (ref 150–450)
PMV BLD AUTO: 11.1 FL (ref 9.2–12.9)
PMV BLD AUTO: 11.1 FL (ref 9.2–12.9)
POCT GLUCOSE: 120 MG/DL (ref 70–110)
POCT GLUCOSE: 152 MG/DL (ref 70–110)
POCT GLUCOSE: 171 MG/DL (ref 70–110)
POCT GLUCOSE: 171 MG/DL (ref 70–110)
POTASSIUM SERPL-SCNC: 3.9 MMOL/L (ref 3.5–5.1)
POTASSIUM SERPL-SCNC: 3.9 MMOL/L (ref 3.5–5.1)
PROTHROMBIN TIME: 17.2 SEC (ref 9–12.5)
PROTHROMBIN TIME: 17.2 SEC (ref 9–12.5)
PULM VEIN S/D RATIO: 0.39
PV PEAK D VEL: 0.38 M/S
PV PEAK S VEL: 0.15 M/S
RA MAJOR: 5.03 CM
RA PRESSURE: 15 MMHG
RA WIDTH: 4.3 CM
RBC # BLD AUTO: 3.07 M/UL (ref 4.6–6.2)
RBC # BLD AUTO: 3.07 M/UL (ref 4.6–6.2)
RIGHT VENTRICULAR END-DIASTOLIC DIMENSION: 4.27 CM
RV TISSUE DOPPLER FREE WALL SYSTOLIC VELOCITY 1 (APICAL 4 CHAMBER VIEW): 4.54 CM/S
SINUS: 3.64 CM
SODIUM SERPL-SCNC: 138 MMOL/L (ref 136–145)
SODIUM SERPL-SCNC: 138 MMOL/L (ref 136–145)
STJ: 3.14 CM
TDI LATERAL: 0.09 M/S
TDI SEPTAL: 0.07 M/S
TDI: 0.08 M/S
TR MAX PG: 17 MMHG
TRICUSPID ANNULAR PLANE SYSTOLIC EXCURSION: 0.51 CM
TV REST PULMONARY ARTERY PRESSURE: 32 MMHG
WBC # BLD AUTO: 7.84 K/UL (ref 3.9–12.7)
WBC # BLD AUTO: 7.84 K/UL (ref 3.9–12.7)

## 2022-02-15 PROCEDURE — 83615 LACTATE (LD) (LDH) ENZYME: CPT | Performed by: HOSPITALIST

## 2022-02-15 PROCEDURE — 93750 PR INTERROGATE VENT ASSIST DEV, IN PERSON, W PHYSICIAN ANALYSIS: ICD-10-PCS | Mod: ,,, | Performed by: INTERNAL MEDICINE

## 2022-02-15 PROCEDURE — 97530 THERAPEUTIC ACTIVITIES: CPT

## 2022-02-15 PROCEDURE — 97535 SELF CARE MNGMENT TRAINING: CPT

## 2022-02-15 PROCEDURE — 25000003 PHARM REV CODE 250: Performed by: INTERNAL MEDICINE

## 2022-02-15 PROCEDURE — 80048 BASIC METABOLIC PNL TOTAL CA: CPT | Performed by: HOSPITALIST

## 2022-02-15 PROCEDURE — 85730 THROMBOPLASTIN TIME PARTIAL: CPT | Performed by: HOSPITALIST

## 2022-02-15 PROCEDURE — 93750 INTERROGATION VAD IN PERSON: CPT | Mod: ,,, | Performed by: INTERNAL MEDICINE

## 2022-02-15 PROCEDURE — 85025 COMPLETE CBC W/AUTO DIFF WBC: CPT | Performed by: HOSPITALIST

## 2022-02-15 PROCEDURE — 25000003 PHARM REV CODE 250: Performed by: PHYSICIAN ASSISTANT

## 2022-02-15 PROCEDURE — 97116 GAIT TRAINING THERAPY: CPT

## 2022-02-15 PROCEDURE — 27000248 HC VAD-ADDITIONAL DAY

## 2022-02-15 PROCEDURE — 84100 ASSAY OF PHOSPHORUS: CPT | Performed by: HOSPITALIST

## 2022-02-15 PROCEDURE — 85610 PROTHROMBIN TIME: CPT | Performed by: HOSPITALIST

## 2022-02-15 PROCEDURE — 99233 SBSQ HOSP IP/OBS HIGH 50: CPT | Mod: 95,,, | Performed by: PHYSICIAN ASSISTANT

## 2022-02-15 PROCEDURE — 36415 COLL VENOUS BLD VENIPUNCTURE: CPT | Performed by: HOSPITALIST

## 2022-02-15 PROCEDURE — 25000003 PHARM REV CODE 250: Performed by: HOSPITALIST

## 2022-02-15 PROCEDURE — 20600001 HC STEP DOWN PRIVATE ROOM

## 2022-02-15 PROCEDURE — 99233 PR SUBSEQUENT HOSPITAL CARE,LEVL III: ICD-10-PCS | Mod: 95,,, | Performed by: PHYSICIAN ASSISTANT

## 2022-02-15 PROCEDURE — 83735 ASSAY OF MAGNESIUM: CPT | Performed by: HOSPITALIST

## 2022-02-15 PROCEDURE — 25000003 PHARM REV CODE 250: Performed by: STUDENT IN AN ORGANIZED HEALTH CARE EDUCATION/TRAINING PROGRAM

## 2022-02-15 RX ORDER — WARFARIN SODIUM 5 MG/1
5 TABLET ORAL DAILY
Status: DISCONTINUED | OUTPATIENT
Start: 2022-02-15 | End: 2022-02-17 | Stop reason: HOSPADM

## 2022-02-15 RX ADMIN — LISINOPRIL 10 MG: 10 TABLET ORAL at 11:02

## 2022-02-15 RX ADMIN — Medication 400 MG: at 09:02

## 2022-02-15 RX ADMIN — POLYETHYLENE GLYCOL 3350 17 G: 17 POWDER, FOR SOLUTION ORAL at 11:02

## 2022-02-15 RX ADMIN — OXYCODONE HYDROCHLORIDE AND ACETAMINOPHEN 1 TABLET: 5; 325 TABLET ORAL at 11:02

## 2022-02-15 RX ADMIN — Medication 1 G: at 11:02

## 2022-02-15 RX ADMIN — FAMOTIDINE 20 MG: 20 TABLET ORAL at 11:02

## 2022-02-15 RX ADMIN — ATORVASTATIN CALCIUM 80 MG: 20 TABLET, FILM COATED ORAL at 11:02

## 2022-02-15 RX ADMIN — SENNOSIDES AND DOCUSATE SODIUM 1 TABLET: 50; 8.6 TABLET ORAL at 11:02

## 2022-02-15 RX ADMIN — FUROSEMIDE 80 MG: 80 TABLET ORAL at 05:02

## 2022-02-15 RX ADMIN — LISINOPRIL 20 MG: 20 TABLET ORAL at 09:02

## 2022-02-15 RX ADMIN — BACLOFEN 10 MG: 10 TABLET ORAL at 09:02

## 2022-02-15 RX ADMIN — WARFARIN SODIUM 5 MG: 5 TABLET ORAL at 05:02

## 2022-02-15 RX ADMIN — BACLOFEN 10 MG: 10 TABLET ORAL at 03:02

## 2022-02-15 RX ADMIN — FUROSEMIDE 80 MG: 80 TABLET ORAL at 11:02

## 2022-02-15 RX ADMIN — Medication 400 MG: at 11:02

## 2022-02-15 RX ADMIN — FAMOTIDINE 20 MG: 20 TABLET ORAL at 09:02

## 2022-02-15 RX ADMIN — LACTULOSE 20 G: 10 SOLUTION ORAL at 04:02

## 2022-02-15 RX ADMIN — BACLOFEN 10 MG: 10 TABLET ORAL at 11:02

## 2022-02-15 NOTE — PROGRESS NOTES
[facility] - Cardiology Stepdown  Heart Transplant  Progress Note    Patient Name: Rocco France  MRN: [id number]  Admission Date: 1/27/2022  Hospital Length of Stay: 19 days  Attending Physician: Pete Baker MD  Primary Care Provider: Teresa Mendoza NP  Principal Problem:Bilateral subdural hematomas    Subjective:     Interval History: INR 1.7, goal now 1.5-2, awaiting echo    Continuous Infusions:   sodium chloride 0.9% 5 mL/hr (02/08/22 0914)     Scheduled Meds:   atorvastatin  80 mg Oral Daily    baclofen  10 mg Oral TID    famotidine  20 mg Oral BID    furosemide  80 mg Oral BID    lisinopriL  10 mg Oral Daily    lisinopriL  20 mg Oral QHS    magnesium oxide  400 mg Oral BID    polyethylene glycol  17 g Oral Daily    senna-docusate 8.6-50 mg  1 tablet Oral Daily    sodium chloride  1 g Oral Daily    warfarin  6 mg Oral Daily     PRN Meds:sodium chloride, acetaminophen, dextrose 10%, glucagon (human recombinant), insulin aspart U-100, oxyCODONE-acetaminophen    Review of patient's allergies indicates:   Allergen Reactions    Pcn [penicillins] Hives     Objective:     Vital Signs (Most Recent):  Temp: 98.5 °F (36.9 °C) (02/15/22 0737)  Pulse: (!) 114 (02/15/22 0756)  Resp: 18 (02/15/22 0737)  BP: (!) 80/0 (02/15/22 0727)  SpO2: 100 % (02/15/22 0737) Vital Signs (24h Range):  Temp:  [96.9 °F (36.1 °C)-98.5 °F (36.9 °C)] 98.5 °F (36.9 °C)  Pulse:  [] 114  Resp:  [16-20] 18  SpO2:  [96 %-100 %] 100 %  BP: ()/(0-75) 80/0     Patient Vitals for the past 72 hrs (Last 3 readings):   Weight   02/15/22 0500 77.7 kg (171 lb 4.8 oz)   02/14/22 0440 77.9 kg (171 lb 11.8 oz)   02/13/22 0445 77.9 kg (171 lb 11.8 oz)     Body mass index is 21.99 kg/m².      Intake/Output Summary (Last 24 hours) at 2/15/2022 0839  Last data filed at 2/15/2022 0629  Gross per 24 hour   Intake 1374 ml   Output 1100 ml   Net 274 ml       Hemodynamic Parameters:           Physical Exam  Constitutional:        General: He is not in acute distress.     Appearance: He is well-developed.   HENT:      Head: Normocephalic and atraumatic.   Eyes:      General:         Right eye: No discharge.         Left eye: No discharge.   Cardiovascular:      Comments: Smooth VAD hum  Pulmonary:      Effort: Pulmonary effort is normal. No respiratory distress.   Abdominal:      General: There is no distension.      Palpations: Abdomen is soft.   Musculoskeletal:         General: No deformity.      Cervical back: Neck supple.   Skin:     General: Skin is warm and dry.   Neurological:      General: No focal deficit present.      Mental Status: He is alert and oriented to person, place, and time.      Comments: Follows commands   Generalized deconditioning     Psychiatric:         Behavior: Behavior normal. Behavior is cooperative.         Thought Content: Thought content normal.         Cognition and Memory: Cognition is not impaired.         Significant Labs:  CBC:  Recent Labs   Lab 02/13/22  0431 02/14/22  0448 02/15/22  0413   WBC 9.90  9.90 9.03  9.03 7.84  7.84   RBC 3.45*  3.45* 3.34*  3.34* 3.07*  3.07*   HGB 9.9*  9.9* 9.7*  9.7* 9.0*  9.0*   HCT 32.8*  32.8* 31.4*  31.4* 28.2*  28.2*     185 175  175 158  158   MCV 95  95 94  94 92  92   MCH 28.7  28.7 29.0  29.0 29.3  29.3   MCHC 30.2*  30.2* 30.9*  30.9* 31.9*  31.9*     BNP:  Recent Labs   Lab 02/09/22  0302 02/11/22 0437 02/14/22 0448   * 302* 284*     CMP:  Recent Labs   Lab 02/09/22  0302 02/09/22  0303 02/11/22  0437 02/12/22  0239 02/13/22  0431 02/14/22  0448 02/15/22  0413   GLU  --    < > 152*  152*   < > 166*  166* 126*  126* 130*  130*   CALCIUM  --    < > 9.0  9.0   < > 9.4  9.4 9.3  9.3 9.2  9.2   ALBUMIN 2.5*  --  2.6*  2.6*  --   --  2.8*  2.8*  --    PROT 7.1  --  7.7  7.7  --   --  8.1  8.1  --    NA  --    < > 134*  134*   < > 137  137 138  138 138  138   K  --    < > 3.9  3.9   < > 4.6  4.6 4.1  4.1 3.9   3.9   CO2  --    < > 25  25   < > 28  28 25  25 26  26   CL  --    < > 99  99   < > 100  100 102  102 102  102   BUN  --    < > 23  23   < > 29*  29* 24*  24* 26*  26*   CREATININE  --    < > 0.8  0.8   < > 0.9  0.9 0.8  0.8 0.8  0.8   ALKPHOS 125  --  137*  137*  --   --  128  128  --    ALT <5*  --  8*  8*  --   --  9*  9*  --    AST 12  --  15  15  --   --  16  16  --    BILITOT 1.3*  --  1.4*  1.4*  --   --  1.6*  1.6*  --     < > = values in this interval not displayed.      Coagulation:   Recent Labs   Lab 02/13/22  0431 02/14/22  0448 02/15/22  0413   INR 1.2  1.2 1.4*  1.4* 1.7*  1.7*   APTT 28.5  28.5 29.1  29.1 31.3  31.3     LDH:  Recent Labs   Lab 02/13/22 0431 02/14/22 0448 02/15/22  0413    219 189     Microbiology:  Microbiology Results (last 7 days)     ** No results found for the last 168 hours. **          I have reviewed all pertinent labs within the past 24 hours.    Estimated Creatinine Clearance: 99.8 mL/min (based on SCr of 0.8 mg/dL).    Diagnostic Results:  I have reviewed and interpreted all pertinent imaging results/findings within the past 24 hours.    Assessment and Plan:     67 yo BM with stage D CHF due to NICMP s/p HM3, s/p ICD, HTN, HLD, T2DM was transferred from outside hospital emergency room after he was found to have subdural hematoma/subarachnoid hemorrhage.  Patient presented to the ER after he was found to have a syncopal event.  Also patient had respiratory distress on arrival for which he was intubated.  Patient was transferred here for further evaluation.  On arrival patient is intubated and is on propofol.  Neurosurgery and Neuro Critical Care were immediately informed.  Neurosurgery recommended to repeat CT after reviewing the prior CT done in the ER in [place].  INR on arrival is 1.6.  He also had a low flow with a increased PI around 4:00 p.m. this evening.  According to wife patient was complaining of headache for the last 2  weeks..  [place] went [place] post office this [place] and she does not know what exactly happened..  ICD was interrogated and did not show any VT/VF.       * Bilateral subdural hematomas  -Patient had a traumatic subdural, subarachnoid hemorrhage after fall on 1/27. Currently no focal deficits  -NCC signed off. NSGY following peripherally.  -INR subtherapeutic. Coumadin restarted 2/5 at low dose with plans to watch INR rise slowly. PharmD to adjust dosing. Goal INR 1.8-2.5  -Plan for repeat CTH when INR therapeutic.  -Goal Na+ > 135, slowly trickled down days after stopping hypertonic saline. Salt tabs given prn. Has stabilized  -Keppra started as AED. EEG negative for seizures.  -Transcranial dopplers done 1/31 without evidence of vasospasm.  -Continue q2h neurochecks.    Acute respiratory failure requiring reintubation  - Extubated 1/28 and on RA  - Tx'd with remdesivir which was since discontinued. Asymptomatic from covid perspective    LVAD (left ventricular assist device) present  -S/P DT HM3 1/13/21  -Current speed 5000  -INR goal lowered 1.5-2 for now. Current INR subtherapeutic. Coumadin restarted as above.  -ASA stopped.   -LDH stable    Procedure: Device Interrogation Including analysis of device parameters  Current Settings: Ventricular Assist Device  Review of device function is stable/unstable stable         TXP LVAD INTERROGATIONS 2/15/2022 2/15/2022 2/14/2022 2/14/2022 2/14/2022 2/14/2022 2/14/2022   Type HeartMate3 HeartMate3 HeartMate3 HeartMate3 HeartMate3 HeartMate3 HeartMate3   Flow 3.9 4.0 4.3 4.2 4.1 4.1 3.7   Speed 5000 5000 5000 5050 5000 5000 5000   PI 5.8 6.2 3.9 4.5 5.3 5.5 6.7   Power ([place]) 3.3 3.5 3.3 3.3 3.4 3.4 3.3   LSL 4600 4600 4600 4600 4600 4600 4600   Pulsatility - - - - - - -       Type 2 diabetes mellitus without complication  -SSI  ACHS     Essential hypertension  -Levophed off. MAP goal >65  -Continue Lisinopril 10qam/20qhs,  doxazosin 2mg BID initiated but discontinued  as BP  controlled with adjusted lisinopril dosing. Will monitor closely and reinitiate if elevated Bps return        CT Fam  Heart Transplant  [facility] - Cardiology Stepdown

## 2022-02-15 NOTE — NURSING
Patient left floor to go to echo. Patient transported with LVAD equipment and LVAD emergency bag with 2 extra batteries, 2 extra clips and an extra controller.

## 2022-02-15 NOTE — PLAN OF CARE
Plan of care discussed with patient.  Patient ambulating independently, fall precautions in place. Patient Aox4 and VS are stable. LVAD DP and numbers WNL, smooth LVAD hum. Patient complains of mild headache which is treated with PRN percocet. Discussed medications and care. Patient has no questions at this time. Will continue to monitor.

## 2022-02-15 NOTE — PROGRESS NOTES
Interdisciplinary Rounds Report:   Attended interdisciplinary rounds with the [facility]/CTS services including the LVAD Coordinators, social workers, cardiologists, surgeons,  PT/OT/Speech, dietician, and unit charge nurses. Discussed patient status, plan of care, goals of care, including DC date, and post discharge needs. Plan of care will be discussed with the patient and/or family per the physician while rounding on the floor. This is a recurring meeting that is medically and socially necessary to collaborate with the interdisciplinary team to assist patient needs and safe discharge.

## 2022-02-15 NOTE — PROGRESS NOTES
[facility] - Cardiology Stepdown  Adult Nutrition  Progress Note    SUMMARY       Recommendations    1. Modify diet to Cardiac/Diabetic + ONS, fluids per MD     2. Encourage >50% meal intake + ONS     3. RD to monitor and follow    Goals: Meet % EEN, EPN by RD f/u date  Nutrition Goal Status: progressing towards goal    Communication of RD Recs: reviewed with RN    Assessment and Plan    Nutrition Problem:  Increased nutrient needs     Related to (etiology):   Physiological causes      Signs and Symptoms (as evidenced by):   +COVID-19     Interventions(treatment strategy):  Collaboration of nutrition care w/ other providers  ONS     Nutrition Diagnosis Status:   Resolved    Malnutrition Assessment       Orbital Region (Subcutaneous Fat Loss): well nourished  Upper Arm Region (Subcutaneous Fat Loss): mild depletion   [place] Region (Muscle Loss): mild depletion  Clavicle Bone Region (Muscle Loss): well nourished  Clavicle and Acromion Bone Region (Muscle Loss): well nourished  Scapular Bone Region (Muscle Loss): well nourished         Reason for Assessment    Reason For Assessment: RD follow-up  Diagnosis: other (see comments) (Bilateral SDH)  Relevant Medical History: CHF, HTN, HLD, DM, LVAD (1/2021)  Interdisciplinary Rounds: did not attend    General Information Comments: Pt presents with bilateral SDH. Hx of LVAD in 2021. Extubated 1/28. Pt being diuresed. Tolerating % PO intake since admit. ONS ordered. No n/v/d/c or difficulties chewing/swallowing. 19# wt loss noted x 2 weeks; likely fluid related as pt being diuresed, has generalized edema, and is -4.4L since admit. NFPE completed 2/15; pt with some mild age-appropriate wasting. No indicators of malnutrition at this time, however at high risk if PO intake worsens.    Nutrition Discharge Planning: Diabetic/Cardiac diet    Nutrition Risk Screen    Nutrition Risk Screen: no indicators present    Nutrition/Diet History    Patient Reported  "Diet/Restrictions/Preferences: general  Spiritual, Cultural Beliefs, Jew Practices, Values that Affect Care: no  Food Allergies: NKFA  Factors Affecting Nutritional Intake: None identified at this time    Anthropometrics    Temp: 97.5 °F (36.4 °C)  Height Method: Stated  Height: 6' 2" (188 cm)  Height (inches): 74 in  Weight Method: Standard Scale  Weight: 77.6 kg (171 lb)  Weight (lb): 171 lb  Ideal Body Weight (IBW), Male: 190 lb  % Ideal Body Weight, Male (lb): 90 %  BMI (Calculated): 21.9  BMI Grade: 18.5-24.9 - normal  Weight Loss: unintentional       Lab/Procedures/Meds    Pertinent Labs Reviewed: reviewed  Pertinent Labs Comments: BUN 26, Glu 130  Pertinent Medications Reviewed: reviewed  Pertinent Medications Comments: lasix, Mg, coumadin    Estimated/Assessed Needs    Weight Used For Calorie Calculations: 77.6 kg (171 lb)  Energy Calorie Requirements (kcal): 1141-8588 kcal/day  Energy Need Method: [place]  Protein Requirements: 82-99 g/d (1-1.2 g/kg)  Weight Used For Protein Calculations: 77.6 kg (171 lb)  Fluid Requirements (mL): 1 mL/kcal or per MD  Estimated Fluid Requirement Method: RDA Method  RDA Method (mL): 1624  CHO Requirement: 240 g/day    Nutrition Prescription Ordered    Current Diet Order: Regular  Nutrition Order Comments: 2000 mL FR  Oral Nutrition Supplement: Boost Glucose Control    Evaluation of Received Nutrient/Fluid Intake    I/O: -4.4L since admit  Energy Calories Required: meeting needs  Protein Required: meeting needs  Comments: LBM 2/13  Tolerance: tolerating  % Intake of Estimated Energy Needs: 50 - 75 %  % Meal Intake: 50 - 75 %    Nutrition Risk    Level of Risk/Frequency of Follow-up: low     Monitor and Evaluation    Food and Nutrient Intake: energy intake,food and beverage intake  Food and Nutrient Adminstration: diet order  Physical Activity and Function: nutrition-related ADLs and IADLs  Anthropometric Measurements: weight,weight change  Biochemical Data, " Medical Tests and Procedures: inflammatory profile,lipid profile,glucose/endocrine profile,gastrointestinal profile,electrolyte and renal panel  Nutrition-Focused Physical Findings: overall appearance     Nutrition Follow-Up    RD Follow-up?: Yes

## 2022-02-15 NOTE — PLAN OF CARE
Plan of care discussed with patient.  Patient ambulating independently, fall precautions in place. LVAD DP and numbers WNL, smooth LVAD hum. Patient has no complaints of pain. Is/Os monitored. BG monitored. Discussed medications and care. Patient has no questions at this time. Will continue to monitor.

## 2022-02-15 NOTE — SUBJECTIVE & OBJECTIVE
Interval History: INR 1.7, goal now 1.5-2, awaiting echo    Continuous Infusions:   sodium chloride 0.9% 5 mL/hr (02/08/22 0914)     Scheduled Meds:   atorvastatin  80 mg Oral Daily    baclofen  10 mg Oral TID    famotidine  20 mg Oral BID    furosemide  80 mg Oral BID    lisinopriL  10 mg Oral Daily    lisinopriL  20 mg Oral QHS    magnesium oxide  400 mg Oral BID    polyethylene glycol  17 g Oral Daily    senna-docusate 8.6-50 mg  1 tablet Oral Daily    sodium chloride  1 g Oral Daily    warfarin  6 mg Oral Daily     PRN Meds:sodium chloride, acetaminophen, dextrose 10%, glucagon (human recombinant), insulin aspart U-100, oxyCODONE-acetaminophen    Review of patient's allergies indicates:   Allergen Reactions    Pcn [penicillins] Hives     Objective:     Vital Signs (Most Recent):  Temp: 98.5 °F (36.9 °C) (02/15/22 0737)  Pulse: (!) 114 (02/15/22 0756)  Resp: 18 (02/15/22 0737)  BP: (!) 80/0 (02/15/22 0727)  SpO2: 100 % (02/15/22 0737) Vital Signs (24h Range):  Temp:  [96.9 °F (36.1 °C)-98.5 °F (36.9 °C)] 98.5 °F (36.9 °C)  Pulse:  [] 114  Resp:  [16-20] 18  SpO2:  [96 %-100 %] 100 %  BP: ()/(0-75) 80/0     Patient Vitals for the past 72 hrs (Last 3 readings):   Weight   02/15/22 0500 77.7 kg (171 lb 4.8 oz)   02/14/22 0440 77.9 kg (171 lb 11.8 oz)   02/13/22 0445 77.9 kg (171 lb 11.8 oz)     Body mass index is 21.99 kg/m².      Intake/Output Summary (Last 24 hours) at 2/15/2022 0839  Last data filed at 2/15/2022 0629  Gross per 24 hour   Intake 1374 ml   Output 1100 ml   Net 274 ml       Hemodynamic Parameters:           Physical Exam  Constitutional:       General: He is not in acute distress.     Appearance: He is well-developed.   HENT:      Head: Normocephalic and atraumatic.   Eyes:      General:         Right eye: No discharge.         Left eye: No discharge.   Cardiovascular:      Comments: Smooth VAD hum  Pulmonary:      Effort: Pulmonary effort is normal. No respiratory distress.    Abdominal:      General: There is no distension.      Palpations: Abdomen is soft.   Musculoskeletal:         General: No deformity.      Cervical back: Neck supple.   Skin:     General: Skin is warm and dry.   Neurological:      General: No focal deficit present.      Mental Status: He is alert and oriented to person, place, and time.      Comments: Follows commands   Generalized deconditioning     Psychiatric:         Behavior: Behavior normal. Behavior is cooperative.         Thought Content: Thought content normal.         Cognition and Memory: Cognition is not impaired.         Significant Labs:  CBC:  Recent Labs   Lab 02/13/22  0431 02/14/22  0448 02/15/22  0413   WBC 9.90  9.90 9.03  9.03 7.84  7.84   RBC 3.45*  3.45* 3.34*  3.34* 3.07*  3.07*   HGB 9.9*  9.9* 9.7*  9.7* 9.0*  9.0*   HCT 32.8*  32.8* 31.4*  31.4* 28.2*  28.2*     185 175  175 158  158   MCV 95  95 94  94 92  92   MCH 28.7  28.7 29.0  29.0 29.3  29.3   MCHC 30.2*  30.2* 30.9*  30.9* 31.9*  31.9*     BNP:  Recent Labs   Lab 02/09/22  0302 02/11/22 0437 02/14/22 0448   * 302* 284*     CMP:  Recent Labs   Lab 02/09/22  0302 02/09/22  0303 02/11/22 0437 02/12/22  0239 02/13/22  0431 02/14/22  0448 02/15/22  0413   GLU  --    < > 152*  152*   < > 166*  166* 126*  126* 130*  130*   CALCIUM  --    < > 9.0  9.0   < > 9.4  9.4 9.3  9.3 9.2  9.2   ALBUMIN 2.5*  --  2.6*  2.6*  --   --  2.8*  2.8*  --    PROT 7.1  --  7.7  7.7  --   --  8.1  8.1  --    NA  --    < > 134*  134*   < > 137  137 138  138 138  138   K  --    < > 3.9  3.9   < > 4.6  4.6 4.1  4.1 3.9  3.9   CO2  --    < > 25  25   < > 28  28 25  25 26  26   CL  --    < > 99  99   < > 100  100 102  102 102  102   BUN  --    < > 23  23   < > 29*  29* 24*  24* 26*  26*   CREATININE  --    < > 0.8  0.8   < > 0.9  0.9 0.8  0.8 0.8  0.8   ALKPHOS 125  --  137*  137*  --   --  128  128  --    ALT <5*  --  8*  8*  --    --  9*  9*  --    AST 12  --  15  15  --   --  16  16  --    BILITOT 1.3*  --  1.4*  1.4*  --   --  1.6*  1.6*  --     < > = values in this interval not displayed.      Coagulation:   Recent Labs   Lab 02/13/22  0431 02/14/22  0448 02/15/22  0413   INR 1.2  1.2 1.4*  1.4* 1.7*  1.7*   APTT 28.5  28.5 29.1  29.1 31.3  31.3     LDH:  Recent Labs   Lab 02/13/22  0431 02/14/22  0448 02/15/22  0413    219 189     Microbiology:  Microbiology Results (last 7 days)     ** No results found for the last 168 hours. **          I have reviewed all pertinent labs within the past 24 hours.    Estimated Creatinine Clearance: 99.8 mL/min (based on SCr of 0.8 mg/dL).    Diagnostic Results:  I have reviewed and interpreted all pertinent imaging results/findings within the past 24 hours.

## 2022-02-15 NOTE — NURSING
Pt back to unit. Patient returned with emergency bag and all equipment accounted for. VS stable upon return

## 2022-02-15 NOTE — PT/OT/SLP PROGRESS
Occupational Therapy Treatment    Patient Name:  Rocco France   MRN:  [id number]  Admit Date: 1/27/2022  Admitting Diagnosis:  Bilateral subdural hematomas   Length of Stay: 19 days  Recent Surgery: * No surgery found *      Recommendations:     Discharge Recommendations: rehabilitation facility  Discharge Equipment Recommendations:  3-in-1 commode,walker, rolling  Barriers to discharge:  Inaccessible home environment,Decreased caregiver support    Plan:     Patient to be seen 4 x/week to address the above listed problems via self-care/home management,therapeutic activities,therapeutic exercises,neuromuscular re-education  · Plan of Care Expires: 02/28/22  · Plan of Care Reviewed with: patient    Assessment:   Rocco France is a 66 y.o. male with a medical diagnosis of Bilateral subdural hematomas.  He presents with the following performance deficits affecting function: weakness,impaired endurance,impaired self care skills,impaired functional mobilty,gait instability,impaired balance,decreased safety awareness,decreased lower extremity function,decreased upper extremity function,impaired cardiopulmonary response to activity,impaired coordination.      Pt tolerated session fairly this date and was willing  to participate. Demonstrating continued decreased activity tolerance, impaired endurance, increased fatigue, impaired balance, impaired cardiopulmonary response to activity and decreased safety awareness, requiring increased time and assistance to complete functional tasks. Patient completed functional mobility ~150ft of community distance with SBA while self-pushing wheelchair, observed with increased FF posture at head, cueing provided. Wheeled back to room due to increased fatigue. Patient independently switched from<>to wall power to<>from battery with [place] using battery vest, VAD settings maintained WNL. Patient is progressing towards established goals, and continues to benefit from acute skilled OT  "services to increase functional performance and improve quality of life. OT to continue to recommend home at discharge to improve pt functional independence and increase patient safety before returning home.      Rehab Prognosis: Good; patient would benefit from acute skilled OT services to address these deficits and reach maximum level of function.        Subjective   Communicated with: Nurse prior to session.  Patient found HOB elevated with telemetry,peripheral IV,LVAD upon OT entry to room. Pt agreeable to participate at this time.    Patient: " Tomorrow's session should go a little better since I did this today. "    Pain/Comfort:  · Pain Rating 1: 0/10  · Pain Rating Post-Intervention 1: 0/10    Objective:   Patient found with: telemetry,peripheral IV,LVAD   General Precautions: Standard, Cardiac fall,LVAD   Orthopedic Precautions:N/A   Braces: N/A   Oxygen Device: Room Air  Vitals: BP (!) 64/0 (BP Location: Left arm, Patient Position: Lying)   Pulse (!) 120   Temp 97.4 °F (36.3 °C) (Oral)   Resp 18   Ht 6' 2" (1.88 m)   Wt 77.6 kg (171 lb)   SpO2 100%   BMI 21.96 kg/m²     Outcome Measures:  [facility] 6 Click ADL: 21    Cognition:   · Alert and Cooperative  · Command following: follows two-step commands  · Communication: clear/fluent    Occupational Performance:  Bed Mobility:    · Patient completed Rolling/Turning to Right with modified independence  · Patient completed Supine to Sit with modified independence on R side of bed; HOB elevated  · Scooting anteriorly to EOB to have both feet planted on floor: modified independence    Functional Mobility/Transfers:   Static Sitting EOB: Supervision   Patient completed Sit <> Stand Transfer from EOB with stand by assistance  with  hand-held assist x 2 trials (wheelchair level)   Static Standing Balance: SBA   Patient completed functional mobility of community distance ~150ft with SBA while self pushing wheelchair; cueing provided on posture      Activities " of Daily Living:  · Upper Body Dressing: independence to don/doff battery vest and switch to<>from wall and battery power  · Declined additional needs    AMPA 6 Click ADL:  AMPAC Total Score: 21    Treatment & Education:  -Pt education on OT role and POC.  -Importance of E/OOB activity with staff assistance, emphasis on daily participation  -Safety during functional transfer and mobility ensured  -Patient provided with education on importance of Bilateral UB/LB integration during functional tasks for improvement in functional performance.   -Education provided/reviewed, questions answered within OT scope of practice.   -Patient demonstrates understanding and learning this date.         Patient left sitting edge of bed with all lines intact, call button in reach and nurse notified    GOALS:   Multidisciplinary Problems     Occupational Therapy Goals        Problem: Occupational Therapy Goal    Goal Priority Disciplines Outcome Interventions   Occupational Therapy Goal     OT, PT/OT Ongoing, Progressing    Description: Goals set on 1/29 with expiration date 2/12:  Patient will increase functional independence with ADLs by performing:    Supine <> Sit with [place].  Grooming while standing at sink with Mod [place] using DME as needed.   UB Dressing with [place].  LB Dressing with [place].  Step transfer with Mod [place] with DME as needed.  Pt will demonstrate understanding of education provided regarding energy conservation and task modification through teach-back method.                      Time Tracking:     OT Date of Treatment: 02/15/22  OT Start Time: 1455  OT Stop Time: 1518  OT Total Time (min): 23 min    Billable Minutes:Self Care/Home Management 10  Therapeutic Activity 13      2/15/2022

## 2022-02-15 NOTE — CARE UPDATE
RAPID RESPONSE NURSE ROUND       Rounding completed with charge RN, Raul. No concerns verbalized at this time. Instructed to call [phone] for further concerns or assistance.

## 2022-02-15 NOTE — PROGRESS NOTES
UPDATE (attempt)    SW to Pt's room for ongoing support & needs assessment. Pt off unit at time of SW visit getting CT scan.     Per interdisciplinary rounds today, Pt likely to be d/c'd to home on 2/16. Recs are for HH PT/OT.     SW placed call to [facility] ([phone], option 4) and left message for [place] requesting new HH provider as per RADHA Muhammad LCSW on 2/14, [facility] will not be able to provide OT/Aide/SW and may not be able to provide PT services.     SW providing ongoing psychosocial, counseling, & emotional support, education, resources, assistance, and discharge planning as indicated.  SW to continue to follow.

## 2022-02-15 NOTE — PROGRESS NOTES
02/15/2022  Micah Velez Jr    Current provider:  Pete Baker MD    Device interrogation:  TXP LVAD INTERROGATIONS 2/15/2022 2/15/2022 2/14/2022 2/14/2022 2/14/2022 2/14/2022 2/14/2022   Type HeartMate3 HeartMate3 HeartMate3 HeartMate3 HeartMate3 HeartMate3 HeartMate3   Flow 3.9 4.0 4.3 4.2 4.1 4.1 3.7   Speed 5000 5000 5000 5050 5000 5000 5000   PI 5.8 6.2 3.9 4.5 5.3 5.5 6.7   Power (Edwards) 3.3 3.5 3.3 3.3 3.4 3.4 3.3   LSL 4600 4600 4600 4600 4600 4600 4600   Pulsatility - - - - - - -          Rounded on Rocco France to ensure all mechanical assist device settings (IABP or VAD) were appropriate and all parameters were within limits.  I was able to ensure all back up equipment was present, the staff had no issues, and the Perfusion Department daily rounding was complete.      For implantable VADs: Interrogation of Ventricular assist device was performed with analysis of device parameters and review of device function. I have personally reviewed the interrogation findings and agree with findings as stated.     In emergency, the nursing units have been notified to contact the perfusion department either by:  Calling [phone] from 630am to 4pm Mon thru Fri, utilizing the On-Call Finder functionality of Epic and searching for Perfusion, or by contacting the hospital  from 4pm to 630am and on weekends and asking to speak with the perfusionist on call.    8:33 AM

## 2022-02-15 NOTE — ASSESSMENT & PLAN NOTE
-S/P DT HM3 1/13/21  -Current speed 5000  -INR goal lowered 1.5-2 for now. Current INR subtherapeutic. Coumadin restarted as above.  -ASA stopped.   -LDH stable    Procedure: Device Interrogation Including analysis of device parameters  Current Settings: Ventricular Assist Device  Review of device function is stable/unstable stable         TXP LVAD INTERROGATIONS 2/15/2022 2/15/2022 2/14/2022 2/14/2022 2/14/2022 2/14/2022 2/14/2022   Type HeartMate3 HeartMate3 HeartMate3 HeartMate3 HeartMate3 HeartMate3 HeartMate3   Flow 3.9 4.0 4.3 4.2 4.1 4.1 3.7   Speed 5000 5000 5000 5050 5000 5000 5000   PI 5.8 6.2 3.9 4.5 5.3 5.5 6.7   Power (Edwards) 3.3 3.5 3.3 3.3 3.4 3.4 3.3   LSL 4600 4600 4600 4600 4600 4600 4600   Pulsatility - - - - - - -

## 2022-02-15 NOTE — PT/OT/SLP PROGRESS
Physical Therapy Treatment    Patient Name:  Rocco France   MRN:  [id number]    Recommendations:     Discharge Recommendations:  rehabilitation facility   Discharge Equipment Recommendations: walker, rolling,3-in-1 commode (pt reports he no longer has his RW)   Barriers to discharge: None    Assessment:     Rocco France is a 66 y.o. male admitted with a medical diagnosis of Bilateral subdural hematomas.  He presents with the following impairments/functional limitations:  weakness,impaired endurance,impaired self care skills,gait instability,impaired functional mobilty,impaired balance. Pt tolerated activity with increased distance ambulated with decreased assistance required with use of rolling walker. Pt able to ambulate 200 ft with RW and close stand by assistance. Pt continues to demo' global weakness and impaired endurance, requires increased time to come to stand, demo'd slow gait with reports of fatigue requiring seated rest. Pt has demo'd progress since admission, still not at functional baseline. Recommendation for inpatient rehabilitation placement would optimize recovery and safe return home. Should pt prefer to return home, PT would recommend outpatient PT and administration of a rolling walker to minimize risk of re-admission. Pt would continue to benefit from acute skilled therapy intervention to address deficits and progress toward prior level of function.       Rehab Prognosis: Good; patient would benefit from acute skilled PT services to address these deficits and reach maximum level of function.    Recent Surgery: * No surgery found *      Plan:     During this hospitalization, patient to be seen 4 x/week to address the identified rehab impairments via gait training,therapeutic activities,therapeutic exercises,neuromuscular re-education and progress toward the following goals:    · Plan of Care Expires:  03/03/22    Subjective     Chief Complaint: Pt with no complaints today, asking to go to  vending machine   Patient/Family Comments/goals: to get better   Pain/Comfort:  · Pain Rating 1: 0/10  · Pain Rating Post-Intervention 1: 0/10      Objective:     Communicated with RN prior to session.  Patient found HOB elevated with telemetry,LVAD upon PT entry to room.     General Precautions: Standard, LVAD,fall   Orthopedic Precautions:N/A   Braces: N/A  Respiratory Status: Room air     Functional Mobility:  · Bed Mobility:     · Supine to Sit: supervision  · Transfers:     · Sit to Stand: from EOB with stand by assistance with rolling walker, increased time required   Gait: Pt ambulated 200 ft with a rolling walker and close stand by assistance. Pt demo'd slow gait speed, small step size, decreased foot clearance, narrow TAMI, excessive sway, flexed posture . Cuing provided for forward gaze and upright posture. Pt required seated rest in w/c in hallway.       AM-PAC 6 CLICK MOBILITY  Turning over in bed (including adjusting bedclothes, sheets and blankets)?: 4  Sitting down on and standing up from a chair with arms (e.g., wheelchair, bedside commode, etc.): 4  Moving from lying on back to sitting on the side of the bed?: 4  Moving to and from a bed to a chair (including a wheelchair)?: 3  Need to walk in hospital room?: 3  Climbing 3-5 steps with a railing?: 3  Basic Mobility Total Score: 21       Therapeutic Activities and Exercises:   Following seated rest in w/c in hallway, PT pushed pt to vending machine. Upon arrival of vending machine, LVAD coordinator present. Pt left in care of LVAD coordinators.   Pt educated on role of PT/POC. Pt verbalized understanding.   Pt encouraged to only perform OOB mobility with assistance from nursing/therapy. Pt agreeable.   Pt encouraged to ambulate daily with assistance/supervision from nursing/therapy. Pt agreeable.      Patient left in w/c with LVAD coordinators in hallway    GOALS:   Multidisciplinary Problems     Physical Therapy Goals        Problem: Physical Therapy  Goal    Goal Priority Disciplines Outcome Goal Variances Interventions   Physical Therapy Goal     PT, PT/OT Ongoing, Progressing     Description: Goals to be met by: 2022      Patient will increase functional independence with mobility by performin. Supine to sit with Modified [place]- met 2/15/2022  2. Sit to stand transfer with Modified [place]  3. Bed to chair transfer with Modified [place] using the least restrictive device.   4. Gait  x 150 feet with Modified [place] using the least restrictive device.                       Time Tracking:     PT Received On: 02/15/22  PT Start Time: 1236     PT Stop Time: 1259  PT Total Time (min): 23 min     Billable Minutes: Gait Training 15 mins  and Therapeutic Activity 8 mins     Treatment Type: Treatment  PT/PTA: PT     PTA Visit Number: 0     02/15/2022

## 2022-02-16 LAB
ALBUMIN SERPL BCP-MCNC: 2.8 G/DL (ref 3.5–5.2)
ALBUMIN SERPL BCP-MCNC: 2.8 G/DL (ref 3.5–5.2)
ALP SERPL-CCNC: 130 U/L (ref 55–135)
ALP SERPL-CCNC: 130 U/L (ref 55–135)
ALT SERPL W/O P-5'-P-CCNC: 8 U/L (ref 10–44)
ALT SERPL W/O P-5'-P-CCNC: 8 U/L (ref 10–44)
ANION GAP SERPL CALC-SCNC: 9 MMOL/L (ref 8–16)
ANION GAP SERPL CALC-SCNC: 9 MMOL/L (ref 8–16)
APTT BLDCRRT: 29.9 SEC (ref 21–32)
APTT BLDCRRT: 29.9 SEC (ref 21–32)
AST SERPL-CCNC: 15 U/L (ref 10–40)
AST SERPL-CCNC: 15 U/L (ref 10–40)
BASOPHILS # BLD AUTO: 0.03 K/UL (ref 0–0.2)
BASOPHILS # BLD AUTO: 0.03 K/UL (ref 0–0.2)
BASOPHILS NFR BLD: 0.4 % (ref 0–1.9)
BASOPHILS NFR BLD: 0.4 % (ref 0–1.9)
BILIRUB DIRECT SERPL-MCNC: 0.7 MG/DL (ref 0.1–0.3)
BILIRUB DIRECT SERPL-MCNC: 0.7 MG/DL (ref 0.1–0.3)
BILIRUB SERPL-MCNC: 1.4 MG/DL (ref 0.1–1)
BILIRUB SERPL-MCNC: 1.4 MG/DL (ref 0.1–1)
BNP SERPL-MCNC: 309 PG/ML (ref 0–99)
BUN SERPL-MCNC: 25 MG/DL (ref 8–23)
BUN SERPL-MCNC: 25 MG/DL (ref 8–23)
CALCIUM SERPL-MCNC: 9.4 MG/DL (ref 8.7–10.5)
CALCIUM SERPL-MCNC: 9.4 MG/DL (ref 8.7–10.5)
CHLORIDE SERPL-SCNC: 100 MMOL/L (ref 95–110)
CHLORIDE SERPL-SCNC: 100 MMOL/L (ref 95–110)
CO2 SERPL-SCNC: 29 MMOL/L (ref 23–29)
CO2 SERPL-SCNC: 29 MMOL/L (ref 23–29)
CREAT SERPL-MCNC: 0.9 MG/DL (ref 0.5–1.4)
CREAT SERPL-MCNC: 0.9 MG/DL (ref 0.5–1.4)
CRP SERPL-MCNC: 19.9 MG/L (ref 0–8.2)
CRP SERPL-MCNC: 19.9 MG/L (ref 0–8.2)
DIFFERENTIAL METHOD: ABNORMAL
DIFFERENTIAL METHOD: ABNORMAL
EOSINOPHIL # BLD AUTO: 0.2 K/UL (ref 0–0.5)
EOSINOPHIL # BLD AUTO: 0.2 K/UL (ref 0–0.5)
EOSINOPHIL NFR BLD: 2.7 % (ref 0–8)
EOSINOPHIL NFR BLD: 2.7 % (ref 0–8)
ERYTHROCYTE [DISTWIDTH] IN BLOOD BY AUTOMATED COUNT: 15.1 % (ref 11.5–14.5)
ERYTHROCYTE [DISTWIDTH] IN BLOOD BY AUTOMATED COUNT: 15.1 % (ref 11.5–14.5)
EST. GFR  (AFRICAN AMERICAN): >60 ML/MIN/1.73 M^2
EST. GFR  (AFRICAN AMERICAN): >60 ML/MIN/1.73 M^2
EST. GFR  (NON AFRICAN AMERICAN): >60 ML/MIN/1.73 M^2
EST. GFR  (NON AFRICAN AMERICAN): >60 ML/MIN/1.73 M^2
GLUCOSE SERPL-MCNC: 118 MG/DL (ref 70–110)
GLUCOSE SERPL-MCNC: 118 MG/DL (ref 70–110)
HCT VFR BLD AUTO: 31.2 % (ref 40–54)
HCT VFR BLD AUTO: 31.2 % (ref 40–54)
HGB BLD-MCNC: 9.6 G/DL (ref 14–18)
HGB BLD-MCNC: 9.6 G/DL (ref 14–18)
IMM GRANULOCYTES # BLD AUTO: 0.04 K/UL (ref 0–0.04)
IMM GRANULOCYTES # BLD AUTO: 0.04 K/UL (ref 0–0.04)
IMM GRANULOCYTES NFR BLD AUTO: 0.5 % (ref 0–0.5)
IMM GRANULOCYTES NFR BLD AUTO: 0.5 % (ref 0–0.5)
INR PPP: 1.6 (ref 0.8–1.2)
INR PPP: 1.6 (ref 0.8–1.2)
LDH SERPL L TO P-CCNC: 255 U/L (ref 110–260)
LYMPHOCYTES # BLD AUTO: 1.5 K/UL (ref 1–4.8)
LYMPHOCYTES # BLD AUTO: 1.5 K/UL (ref 1–4.8)
LYMPHOCYTES NFR BLD: 19.8 % (ref 18–48)
LYMPHOCYTES NFR BLD: 19.8 % (ref 18–48)
MAGNESIUM SERPL-MCNC: 2.1 MG/DL (ref 1.6–2.6)
MAGNESIUM SERPL-MCNC: 2.1 MG/DL (ref 1.6–2.6)
MCH RBC QN AUTO: 28.1 PG (ref 27–31)
MCH RBC QN AUTO: 28.1 PG (ref 27–31)
MCHC RBC AUTO-ENTMCNC: 30.8 G/DL (ref 32–36)
MCHC RBC AUTO-ENTMCNC: 30.8 G/DL (ref 32–36)
MCV RBC AUTO: 91 FL (ref 82–98)
MCV RBC AUTO: 91 FL (ref 82–98)
MONOCYTES # BLD AUTO: 0.8 K/UL (ref 0.3–1)
MONOCYTES # BLD AUTO: 0.8 K/UL (ref 0.3–1)
MONOCYTES NFR BLD: 9.7 % (ref 4–15)
MONOCYTES NFR BLD: 9.7 % (ref 4–15)
NEUTROPHILS # BLD AUTO: 5.2 K/UL (ref 1.8–7.7)
NEUTROPHILS # BLD AUTO: 5.2 K/UL (ref 1.8–7.7)
NEUTROPHILS NFR BLD: 66.9 % (ref 38–73)
NEUTROPHILS NFR BLD: 66.9 % (ref 38–73)
NRBC BLD-RTO: 0 /100 WBC
NRBC BLD-RTO: 0 /100 WBC
PHOSPHATE SERPL-MCNC: 2.9 MG/DL (ref 2.7–4.5)
PHOSPHATE SERPL-MCNC: 2.9 MG/DL (ref 2.7–4.5)
PLATELET # BLD AUTO: 182 K/UL (ref 150–450)
PLATELET # BLD AUTO: 182 K/UL (ref 150–450)
PMV BLD AUTO: 10.7 FL (ref 9.2–12.9)
PMV BLD AUTO: 10.7 FL (ref 9.2–12.9)
POCT GLUCOSE: 135 MG/DL (ref 70–110)
POCT GLUCOSE: 136 MG/DL (ref 70–110)
POCT GLUCOSE: 180 MG/DL (ref 70–110)
POCT GLUCOSE: 218 MG/DL (ref 70–110)
POTASSIUM SERPL-SCNC: 4 MMOL/L (ref 3.5–5.1)
POTASSIUM SERPL-SCNC: 4 MMOL/L (ref 3.5–5.1)
PREALB SERPL-MCNC: 14 MG/DL (ref 20–43)
PREALB SERPL-MCNC: 14 MG/DL (ref 20–43)
PROT SERPL-MCNC: 8.1 G/DL (ref 6–8.4)
PROT SERPL-MCNC: 8.1 G/DL (ref 6–8.4)
PROTHROMBIN TIME: 16.7 SEC (ref 9–12.5)
PROTHROMBIN TIME: 16.7 SEC (ref 9–12.5)
RBC # BLD AUTO: 3.42 M/UL (ref 4.6–6.2)
RBC # BLD AUTO: 3.42 M/UL (ref 4.6–6.2)
SODIUM SERPL-SCNC: 138 MMOL/L (ref 136–145)
SODIUM SERPL-SCNC: 138 MMOL/L (ref 136–145)
WBC # BLD AUTO: 7.76 K/UL (ref 3.9–12.7)
WBC # BLD AUTO: 7.76 K/UL (ref 3.9–12.7)

## 2022-02-16 PROCEDURE — 83735 ASSAY OF MAGNESIUM: CPT | Performed by: HOSPITALIST

## 2022-02-16 PROCEDURE — 83880 ASSAY OF NATRIURETIC PEPTIDE: CPT | Performed by: HOSPITALIST

## 2022-02-16 PROCEDURE — 85610 PROTHROMBIN TIME: CPT | Performed by: HOSPITALIST

## 2022-02-16 PROCEDURE — 94761 N-INVAS EAR/PLS OXIMETRY MLT: CPT

## 2022-02-16 PROCEDURE — 25000003 PHARM REV CODE 250: Performed by: PHYSICIAN ASSISTANT

## 2022-02-16 PROCEDURE — 83615 LACTATE (LD) (LDH) ENZYME: CPT | Performed by: HOSPITALIST

## 2022-02-16 PROCEDURE — 25000003 PHARM REV CODE 250: Performed by: HOSPITALIST

## 2022-02-16 PROCEDURE — 84100 ASSAY OF PHOSPHORUS: CPT | Performed by: HOSPITALIST

## 2022-02-16 PROCEDURE — 85025 COMPLETE CBC W/AUTO DIFF WBC: CPT | Performed by: HOSPITALIST

## 2022-02-16 PROCEDURE — 93750 INTERROGATION VAD IN PERSON: CPT | Mod: ,,, | Performed by: INTERNAL MEDICINE

## 2022-02-16 PROCEDURE — 25000003 PHARM REV CODE 250: Performed by: STUDENT IN AN ORGANIZED HEALTH CARE EDUCATION/TRAINING PROGRAM

## 2022-02-16 PROCEDURE — 25000003 PHARM REV CODE 250: Performed by: INTERNAL MEDICINE

## 2022-02-16 PROCEDURE — 86140 C-REACTIVE PROTEIN: CPT | Performed by: HOSPITALIST

## 2022-02-16 PROCEDURE — 20600001 HC STEP DOWN PRIVATE ROOM

## 2022-02-16 PROCEDURE — 80076 HEPATIC FUNCTION PANEL: CPT | Performed by: HOSPITALIST

## 2022-02-16 PROCEDURE — 93750 PR INTERROGATE VENT ASSIST DEV, IN PERSON, W PHYSICIAN ANALYSIS: ICD-10-PCS | Mod: ,,, | Performed by: INTERNAL MEDICINE

## 2022-02-16 PROCEDURE — 80048 BASIC METABOLIC PNL TOTAL CA: CPT | Performed by: HOSPITALIST

## 2022-02-16 PROCEDURE — 27000248 HC VAD-ADDITIONAL DAY

## 2022-02-16 PROCEDURE — 85730 THROMBOPLASTIN TIME PARTIAL: CPT | Performed by: HOSPITALIST

## 2022-02-16 PROCEDURE — 97535 SELF CARE MNGMENT TRAINING: CPT

## 2022-02-16 PROCEDURE — 36415 COLL VENOUS BLD VENIPUNCTURE: CPT | Performed by: HOSPITALIST

## 2022-02-16 PROCEDURE — 84134 ASSAY OF PREALBUMIN: CPT | Performed by: HOSPITALIST

## 2022-02-16 RX ADMIN — POLYETHYLENE GLYCOL 3350 17 G: 17 POWDER, FOR SOLUTION ORAL at 09:02

## 2022-02-16 RX ADMIN — Medication 400 MG: at 09:02

## 2022-02-16 RX ADMIN — FAMOTIDINE 20 MG: 20 TABLET ORAL at 09:02

## 2022-02-16 RX ADMIN — BACLOFEN 10 MG: 10 TABLET ORAL at 03:02

## 2022-02-16 RX ADMIN — ATORVASTATIN CALCIUM 80 MG: 20 TABLET, FILM COATED ORAL at 09:02

## 2022-02-16 RX ADMIN — SENNOSIDES AND DOCUSATE SODIUM 1 TABLET: 50; 8.6 TABLET ORAL at 09:02

## 2022-02-16 RX ADMIN — LISINOPRIL 20 MG: 20 TABLET ORAL at 09:02

## 2022-02-16 RX ADMIN — BACLOFEN 10 MG: 10 TABLET ORAL at 09:02

## 2022-02-16 RX ADMIN — FUROSEMIDE 80 MG: 80 TABLET ORAL at 05:02

## 2022-02-16 RX ADMIN — INSULIN ASPART 2 UNITS: 100 INJECTION, SOLUTION INTRAVENOUS; SUBCUTANEOUS at 12:02

## 2022-02-16 RX ADMIN — WARFARIN SODIUM 5 MG: 5 TABLET ORAL at 05:02

## 2022-02-16 RX ADMIN — FUROSEMIDE 80 MG: 80 TABLET ORAL at 09:02

## 2022-02-16 RX ADMIN — ACETAMINOPHEN 650 MG: 325 TABLET ORAL at 11:02

## 2022-02-16 RX ADMIN — LISINOPRIL 10 MG: 10 TABLET ORAL at 09:02

## 2022-02-16 RX ADMIN — Medication 1 G: at 09:02

## 2022-02-16 NOTE — RESPIRATORY THERAPY
RAPID RESPONSE RESPIRATORY CHART CHECK       Chart check completed,  instructed to call [phone] for further concerns or assistance.

## 2022-02-16 NOTE — PROGRESS NOTES
Called Mrs. France, to remind her to bring pts emergency bag when she comes to pick him up.  Or to make sure it comes with whomever picks him up.  She verbalized understanding and agreement. I told her I'm not sure if he is going home today or tomorrow.

## 2022-02-16 NOTE — PROGRESS NOTES
02/16/2022  Rafal Cullen    Current provider:  Manuel Ramos Jr.,*    Device interrogation:  TXP LVAD INTERROGATIONS 2/16/2022 2/15/2022 2/15/2022 2/15/2022 2/15/2022 2/14/2022 2/14/2022   Type HeartMate3 HeartMate3 HeartMate3 HeartMate3 HeartMate3 HeartMate3 HeartMate3   Flow 4.6 4.5 4.1 3.9 4.0 4.3 4.2   Speed 5000 5000 5000 5000 5000 5000 5050   PI 3.6 3.2 6.6 5.8 6.2 3.9 4.5   Power (Edwards) 3.4 3.3 3.4 3.3 3.5 3.3 3.3   LSL 4600 - 4600 4600 4600 4600 4600   Pulsatility No Pulse Intermittent pulse Intermittent pulse - - - -          Rounded on Rocco France to ensure all mechanical assist device settings (IABP or VAD) were appropriate and all parameters were within limits.  I was able to ensure all back up equipment was present, the staff had no issues, and the Perfusion Department daily rounding was complete.      For implantable VADs: Interrogation of Ventricular assist device was performed with analysis of device parameters and review of device function. I have personally reviewed the interrogation findings and agree with findings as stated.     In emergency, the nursing units have been notified to contact the perfusion department either by:  Calling [phone] from 630am to 4pm Mon thru Fri, utilizing the On-Call Finder functionality of Epic and searching for Perfusion, or by contacting the hospital  from 4pm to 630am and on weekends and asking to speak with the perfusionist on call.    10:15 AM

## 2022-02-16 NOTE — PT/OT/SLP PROGRESS
Occupational Therapy  Treatment    Name: Rocco France  MRN: [id number]  Admitting Diagnosis:  Bilateral subdural hematomas       Recommendations:     Discharge Recommendations: Rehab  Discharge Equipment Recommendations:  3-in-1 commode,walker, rolling  Barriers to discharge:       Assessment:     Rocco France is a 66 y.o. male with a medical diagnosis of Bilateral subdural hematomas.  He was lethargic upon arrival and reports that he has had chronic lack of sleep, but he able to follow multi-step commands and cues for transfer. The patient T/F from supine -> sit with SBA, sit <> stand with SBA, and donned/doffed socks with SBA. He tolerated the treatment well with no c/o of pain or dizziness. Performance deficits affecting function are impaired endurance,impaired self care skills,impaired functional mobilty,decreased upper extremity function,impaired balance.     Rehab Prognosis:  Good; patient would benefit from acute skilled OT services to address these deficits and reach maximum level of function.       Plan:     Patient to be seen 4 x/week to address the above listed problems via self-care/home management,therapeutic activities  · Plan of Care Expires: 03/02/22  · Plan of Care Reviewed with: patient   · OT present during treatment to maximize patient's safety     Subjective     Pain/Comfort:  Pain Rating 1: 0/10    Objective:     Communicated with: RN prior to session.  Patient found supine with telemetry,peripheral IV,LVAD upon OT entry to room.    General Precautions: Standard, fall,LVAD   Orthopedic Precautions:N/A   Braces: N/A  Respiratory Status: Room air     Occupational Performance:     Bed Mobility:    · Patient completed Supine to Sit with stand by assistance     Functional Mobility/Transfers:  · Patient completed Sit <> Stand Transfer with stand by assistance  with  no assistive device   · Functional Mobility:   - good static sitting balance at EOB   - stand -> sit transfer to chair      Activities of Daily Living:  · Lower Body Dressing: stand by assistance donning/doffing socks       [facility] 6 Click ADL: 18      Patient left up in chair with all lines intact and call button in reachEducation:      GOALS:   Multidisciplinary Problems     Occupational Therapy Goals        Problem: Occupational Therapy Goal    Goal Priority Disciplines Outcome Interventions   Occupational Therapy Goal     OT, PT/OT Ongoing, Progressing    Description: Goals set on 1/29 with expiration date 2/12:  Patient will increase functional independence with ADLs by performing:    Supine <> Sit with [place].  Grooming while standing at sink with Mod [place] using DME as needed.   UB Dressing with [place].  LB Dressing with [place].  Step transfer with Mod [place] with DME as needed.  Pt will demonstrate understanding of education provided regarding energy conservation and task modification through teach-back method.                      Time Tracking:     OT Date of Treatment: 02/16/22  OT Start Time: 1113  OT Stop Time: 1123  OT Total Time (min): 10 min    Billable Minutes:Self Care/Home Management 10               2/16/2022

## 2022-02-16 NOTE — SUBJECTIVE & OBJECTIVE
Interval History: INR 1.6, awaiting head ct for discharge     Continuous Infusions:   sodium chloride 0.9% 5 mL/hr (02/08/22 0914)     Scheduled Meds:   atorvastatin  80 mg Oral Daily    baclofen  10 mg Oral TID    famotidine  20 mg Oral BID    furosemide  80 mg Oral BID    lisinopriL  10 mg Oral Daily    lisinopriL  20 mg Oral QHS    magnesium oxide  400 mg Oral BID    polyethylene glycol  17 g Oral Daily    senna-docusate 8.6-50 mg  1 tablet Oral Daily    sodium chloride  1 g Oral Daily    warfarin  5 mg Oral Daily     PRN Meds:sodium chloride, acetaminophen, dextrose 10%, glucagon (human recombinant), insulin aspart U-100, oxyCODONE-acetaminophen    Review of patient's allergies indicates:   Allergen Reactions    Pcn [penicillins] Hives     Objective:     Vital Signs (Most Recent):  Temp: 98.9 °F (37.2 °C) (02/16/22 0500)  Pulse: 107 (02/16/22 0600)  Resp: 18 (02/16/22 0500)  BP: 120/78 (02/16/22 0500)  SpO2: 99 % (02/16/22 0500) Vital Signs (24h Range):  Temp:  [97.4 °F (36.3 °C)-98.9 °F (37.2 °C)] 98.9 °F (37.2 °C)  Pulse:  [] 107  Resp:  [16-18] 18  SpO2:  [94 %-100 %] 99 %  BP: ()/(0-78) 120/78     Patient Vitals for the past 72 hrs (Last 3 readings):   Weight   02/16/22 0500 77.8 kg (171 lb 8.3 oz)   02/15/22 0900 77.6 kg (171 lb)   02/15/22 0500 77.7 kg (171 lb 4.8 oz)     Body mass index is 22.02 kg/m².      Intake/Output Summary (Last 24 hours) at 2/16/2022 0715  Last data filed at 2/15/2022 2000  Gross per 24 hour   Intake 420 ml   Output 325 ml   Net 95 ml       Hemodynamic Parameters:           Physical Exam  Constitutional:       General: He is not in acute distress.     Appearance: He is well-developed.   HENT:      Head: Normocephalic and atraumatic.   Eyes:      General:         Right eye: No discharge.         Left eye: No discharge.   Cardiovascular:      Comments: Smooth VAD hum  Pulmonary:      Effort: Pulmonary effort is normal. No respiratory distress.   Abdominal:       General: There is no distension.      Palpations: Abdomen is soft.   Musculoskeletal:         General: No deformity.      Cervical back: Neck supple.   Skin:     General: Skin is warm and dry.   Neurological:      General: No focal deficit present.      Mental Status: He is alert and oriented to person, place, and time.      Comments: Follows commands   Generalized deconditioning     Psychiatric:         Behavior: Behavior normal. Behavior is cooperative.         Thought Content: Thought content normal.         Cognition and Memory: Cognition is not impaired.         Significant Labs:  CBC:  Recent Labs   Lab 02/14/22  0448 02/15/22  0413 02/16/22  0509   WBC 9.03  9.03 7.84  7.84 7.76  7.76   RBC 3.34*  3.34* 3.07*  3.07* 3.42*  3.42*   HGB 9.7*  9.7* 9.0*  9.0* 9.6*  9.6*   HCT 31.4*  31.4* 28.2*  28.2* 31.2*  31.2*     175 158  158 182  182   MCV 94  94 92  92 91  91   MCH 29.0  29.0 29.3  29.3 28.1  28.1   MCHC 30.9*  30.9* 31.9*  31.9* 30.8*  30.8*     BNP:  Recent Labs   Lab 02/11/22 0437 02/14/22 0448 02/16/22  0509   * 284* 309*     CMP:  Recent Labs   Lab 02/11/22 0437 02/12/22  0239 02/14/22  0448 02/15/22  0413 02/16/22  0509   *  152*   < > 126*  126* 130*  130* 118*  118*   CALCIUM 9.0  9.0   < > 9.3  9.3 9.2  9.2 9.4  9.4   ALBUMIN 2.6*  2.6*  --  2.8*  2.8*  --  2.8*  2.8*   PROT 7.7  7.7  --  8.1  8.1  --  8.1  8.1   *  134*   < > 138  138 138  138 138  138   K 3.9  3.9   < > 4.1  4.1 3.9  3.9 4.0  4.0   CO2 25  25   < > 25  25 26  26 29  29   CL 99  99   < > 102  102 102  102 100  100   BUN 23  23   < > 24*  24* 26*  26* 25*  25*   CREATININE 0.8  0.8   < > 0.8  0.8 0.8  0.8 0.9  0.9   ALKPHOS 137*  137*  --  128  128  --  130  130   ALT 8*  8*  --  9*  9*  --  8*  8*   AST 15  15  --  16  16  --  15  15   BILITOT 1.4*  1.4*  --  1.6*  1.6*  --  1.4*  1.4*    < > = values in this interval not  displayed.      Coagulation:   Recent Labs   Lab 02/14/22 0448 02/15/22  0413 02/16/22  0509   INR 1.4*  1.4* 1.7*  1.7* 1.6*  1.6*   APTT 29.1  29.1 31.3  31.3 29.9  29.9     LDH:  Recent Labs   Lab 02/14/22 0448 02/15/22  0413 02/16/22  0509    189 255     Microbiology:  Microbiology Results (last 7 days)     ** No results found for the last 168 hours. **          I have reviewed all pertinent labs within the past 24 hours.    Estimated Creatinine Clearance: 88.8 mL/min (based on SCr of 0.9 mg/dL).    Diagnostic Results:  I have reviewed and interpreted all pertinent imaging results/findings within the past 24 hours.

## 2022-02-16 NOTE — PROGRESS NOTES
"UPDATE    SW to Pt's room for ongoing support & assessment of needs. Pt presents as AAO x4, calm, cooperative, and asking and answering questions appropriately. Pt reports feeling overall well today but notes having felt somewhat "off" when he woke up this morning. Pt shares some anxiety about d/c to home d/t fear that he will not be able to control his headaches, however Pt acknowledges he would rather be home than hospitalized or in rehab. SW reviewed that Pt's insurance is working to authorize a HH that can provide services Pt needs. Pt is in agreement with home PT/OT/aide if needed. Pt denies needs today. Reports his spouse will come to get him tomorrow if d/c'd. Emotional support & presence provided.     JULIO C placed call to [facility] ([phone] opt [phone]  F: [phone]) and spoke to [place], who advised she is working on new HH placement for Pt so that Pt can have PT/OT. [place] to update JULIO C when Pt is assigned. Updated N of dispo date for 2/17. SW providing ongoing psychosocial, counseling, & emotional support, education, resources, assistance, and discharge planning as indicated.  SW to continue to follow.  "

## 2022-02-16 NOTE — PLAN OF CARE
Pt free of falls/trauma/injuries.  Denies c/o SOB, CP, or discomfort.  Generalized skin remains CDI; No edema noted.  LVAD working properly this shift without any complications.  LVAD dressing to be changed every other day; dressing remains CDI.  Pt tolerating plan of care.

## 2022-02-16 NOTE — ASSESSMENT & PLAN NOTE
-S/P DT HM3 1/13/21  -Current speed 5000  -INR goal lowered 1.5-2 for now. Current INR subtherapeutic. Coumadin restarted as above.  -ASA stopped.   -LDH stable    Procedure: Device Interrogation Including analysis of device parameters  Current Settings: Ventricular Assist Device  Review of device function is stable/unstable stable         TXP LVAD INTERROGATIONS 2/15/2022 2/15/2022 2/15/2022 2/15/2022 2/14/2022 2/14/2022 2/14/2022   Type HeartMate3 HeartMate3 HeartMate3 HeartMate3 HeartMate3 HeartMate3 HeartMate3   Flow 4.5 4.1 3.9 4.0 4.3 4.2 4.1   Speed 5000 5000 5000 5000 5000 5050 5000   PI 3.2 6.6 5.8 6.2 3.9 4.5 5.3   Power (Edwards) 3.3 3.4 3.3 3.5 3.3 3.3 3.4   LSL - 4600 4600 4600 4600 4600 4600   Pulsatility Intermittent pulse Intermittent pulse - - - - -

## 2022-02-16 NOTE — PROGRESS NOTES
[facility] - Cardiology Stepdown  Heart Transplant  Progress Note    Patient Name: Rocco France  MRN: [id number]  Admission Date: 1/27/2022  Hospital Length of Stay: 20 days  Attending Physician: Manuel Ramos Jr.,*  Primary Care Provider: Teresa Mendoza NP  Principal Problem:Bilateral subdural hematomas    Subjective:     Interval History: INR 1.6, awaiting head ct for discharge     Continuous Infusions:   sodium chloride 0.9% 5 mL/hr (02/08/22 0914)     Scheduled Meds:   atorvastatin  80 mg Oral Daily    baclofen  10 mg Oral TID    famotidine  20 mg Oral BID    furosemide  80 mg Oral BID    lisinopriL  10 mg Oral Daily    lisinopriL  20 mg Oral QHS    magnesium oxide  400 mg Oral BID    polyethylene glycol  17 g Oral Daily    senna-docusate 8.6-50 mg  1 tablet Oral Daily    sodium chloride  1 g Oral Daily    warfarin  5 mg Oral Daily     PRN Meds:sodium chloride, acetaminophen, dextrose 10%, glucagon (human recombinant), insulin aspart U-100, oxyCODONE-acetaminophen    Review of patient's allergies indicates:   Allergen Reactions    Pcn [penicillins] Hives     Objective:     Vital Signs (Most Recent):  Temp: 98.9 °F (37.2 °C) (02/16/22 0500)  Pulse: 107 (02/16/22 0600)  Resp: 18 (02/16/22 0500)  BP: 120/78 (02/16/22 0500)  SpO2: 99 % (02/16/22 0500) Vital Signs (24h Range):  Temp:  [97.4 °F (36.3 °C)-98.9 °F (37.2 °C)] 98.9 °F (37.2 °C)  Pulse:  [] 107  Resp:  [16-18] 18  SpO2:  [94 %-100 %] 99 %  BP: ()/(0-78) 120/78     Patient Vitals for the past 72 hrs (Last 3 readings):   Weight   02/16/22 0500 77.8 kg (171 lb 8.3 oz)   02/15/22 0900 77.6 kg (171 lb)   02/15/22 0500 77.7 kg (171 lb 4.8 oz)     Body mass index is 22.02 kg/m².      Intake/Output Summary (Last 24 hours) at 2/16/2022 0715  Last data filed at 2/15/2022 2000  Gross per 24 hour   Intake 420 ml   Output 325 ml   Net 95 ml       Hemodynamic Parameters:           Physical Exam  Constitutional:       General: He is not in  acute distress.     Appearance: He is well-developed.   HENT:      Head: Normocephalic and atraumatic.   Eyes:      General:         Right eye: No discharge.         Left eye: No discharge.   Cardiovascular:      Comments: Smooth VAD hum  Pulmonary:      Effort: Pulmonary effort is normal. No respiratory distress.   Abdominal:      General: There is no distension.      Palpations: Abdomen is soft.   Musculoskeletal:         General: No deformity.      Cervical back: Neck supple.   Skin:     General: Skin is warm and dry.   Neurological:      General: No focal deficit present.      Mental Status: He is alert and oriented to person, place, and time.      Comments: Follows commands   Generalized deconditioning     Psychiatric:         Behavior: Behavior normal. Behavior is cooperative.         Thought Content: Thought content normal.         Cognition and Memory: Cognition is not impaired.         Significant Labs:  CBC:  Recent Labs   Lab 02/14/22  0448 02/15/22  0413 02/16/22  0509   WBC 9.03  9.03 7.84  7.84 7.76  7.76   RBC 3.34*  3.34* 3.07*  3.07* 3.42*  3.42*   HGB 9.7*  9.7* 9.0*  9.0* 9.6*  9.6*   HCT 31.4*  31.4* 28.2*  28.2* 31.2*  31.2*     175 158  158 182  182   MCV 94  94 92  92 91  91   MCH 29.0  29.0 29.3  29.3 28.1  28.1   MCHC 30.9*  30.9* 31.9*  31.9* 30.8*  30.8*     BNP:  Recent Labs   Lab 02/11/22 0437 02/14/22 0448 02/16/22  0509   * 284* 309*     CMP:  Recent Labs   Lab 02/11/22 0437 02/12/22  0239 02/14/22  0448 02/15/22  0413 02/16/22  0509   *  152*   < > 126*  126* 130*  130* 118*  118*   CALCIUM 9.0  9.0   < > 9.3  9.3 9.2  9.2 9.4  9.4   ALBUMIN 2.6*  2.6*  --  2.8*  2.8*  --  2.8*  2.8*   PROT 7.7  7.7  --  8.1  8.1  --  8.1  8.1   *  134*   < > 138  138 138  138 138  138   K 3.9  3.9   < > 4.1  4.1 3.9  3.9 4.0  4.0   CO2 25  25   < > 25  25 26  26 29  29   CL 99  99   < > 102  102 102  102 100  100    BUN 23  23   < > 24*  24* 26*  26* 25*  25*   CREATININE 0.8  0.8   < > 0.8  0.8 0.8  0.8 0.9  0.9   ALKPHOS 137*  137*  --  128  128  --  130  130   ALT 8*  8*  --  9*  9*  --  8*  8*   AST 15  15  --  16  16  --  15  15   BILITOT 1.4*  1.4*  --  1.6*  1.6*  --  1.4*  1.4*    < > = values in this interval not displayed.      Coagulation:   Recent Labs   Lab 02/14/22 0448 02/15/22  0413 02/16/22  0509   INR 1.4*  1.4* 1.7*  1.7* 1.6*  1.6*   APTT 29.1  29.1 31.3  31.3 29.9  29.9     LDH:  Recent Labs   Lab 02/14/22  0448 02/15/22  0413 02/16/22  0509    189 255     Microbiology:  Microbiology Results (last 7 days)     ** No results found for the last 168 hours. **          I have reviewed all pertinent labs within the past 24 hours.    Estimated Creatinine Clearance: 88.8 mL/min (based on SCr of 0.9 mg/dL).    Diagnostic Results:  I have reviewed and interpreted all pertinent imaging results/findings within the past 24 hours.    Assessment and Plan:     67 yo BM with stage D CHF due to NICMP s/p HM3, s/p ICD, HTN, HLD, T2DM was transferred from outside hospital emergency room after he was found to have subdural hematoma/subarachnoid hemorrhage.  Patient presented to the ER after he was found to have a syncopal event.  Also patient had respiratory distress on arrival for which he was intubated.  Patient was transferred here for further evaluation.  On arrival patient is intubated and is on propofol.  Neurosurgery and Neuro Critical Care were immediately informed.  Neurosurgery recommended to repeat CT after reviewing the prior CT done in the ER in [place].  INR on arrival is 1.6.  He also had a low flow with a increased PI around 4:00 p.m. this evening.  According to wife patient was complaining of headache for the last 2 weeks..  He went to post office this [place] and she does not know what exactly happened..  ICD was interrogated and did not show any VT/VF.       * Bilateral  subdural hematomas  -Patient had a traumatic subdural, subarachnoid hemorrhage after fall on 1/27. Currently no focal deficits  -NCC signed off. NSGY following peripherally.  -INR subtherapeutic. Coumadin restarted 2/5 at low dose with plans to watch INR rise slowly. PharmD to adjust dosing. Goal INR 1.8-2.5  -Plan for repeat CTH when INR therapeutic.  -Goal Na+ > 135, slowly trickled down days after stopping hypertonic saline. Salt tabs given prn. Has stabilized  -Keppra started as AED. EEG negative for seizures.  -Transcranial dopplers done 1/31 without evidence of vasospasm.  -Continue q2h neurochecks.    Acute respiratory failure requiring reintubation  - Extubated 1/28 and on RA  - Tx'd with remdesivir which was since discontinued. Asymptomatic from covid perspective    LVAD (left ventricular assist device) present  -S/P DT HM3 1/13/21  -Current speed 5000  -INR goal lowered 1.5-2 for now. Current INR subtherapeutic. Coumadin restarted as above.  -ASA stopped.   -LDH stable    Procedure: Device Interrogation Including analysis of device parameters  Current Settings: Ventricular Assist Device  Review of device function is stable/unstable stable         TXP LVAD INTERROGATIONS 2/15/2022 2/15/2022 2/15/2022 2/15/2022 2/14/2022 2/14/2022 2/14/2022   Type HeartMate3 HeartMate3 HeartMate3 HeartMate3 HeartMate3 HeartMate3 HeartMate3   Flow 4.5 4.1 3.9 4.0 4.3 4.2 4.1   Speed 5000 5000 5000 5000 5000 5050 5000   PI 3.2 6.6 5.8 6.2 3.9 4.5 5.3   Power (Edwards) 3.3 3.4 3.3 3.5 3.3 3.3 3.4   LSL - 4600 4600 4600 4600 4600 4600   Pulsatility Intermittent pulse Intermittent pulse - - - - -       Type 2 diabetes mellitus without complication  -SSI  ACHS     Essential hypertension  -Levophed off. MAP goal >65  -Continue Lisinopril 10qam/20qhs,  doxazosin 2mg BID initiated but discontinued  as BP controlled with adjusted lisinopril dosing. Will monitor closely and reinitiate if elevated Bps return        Marcia Vogel,  PA  Heart Transplant  [facility] - Cardiology Stepdown

## 2022-02-16 NOTE — PLAN OF CARE
Problem: Occupational Therapy Goal  Goal: Occupational Therapy Goal  Description: Goals set on 1/29 with expiration date 2/12:  Patient will increase functional independence with ADLs by performing:    Supine <> Sit with [place].  Grooming while standing at sink with Mod [place] using DME as needed.   UB Dressing with [place].  LB Dressing with [place].  Step transfer with Mod [place] with DME as needed.  Pt will demonstrate understanding of education provided regarding energy conservation and task modification through teach-back method.     Outcome: Ongoing, Progressing

## 2022-02-16 NOTE — PROCEDURES
"Rocco France is a 66 y.o. male patient.    Temp: 97.9 °F (36.6 °C) (02/16/22 1137)  Pulse: (!) 113 (02/16/22 1137)  Resp: 18 (02/16/22 1137)  BP: (!) 72/0 (02/16/22 0953)  SpO2: 97 % (02/16/22 1137)  Weight: 77.8 kg (171 lb 8.3 oz) (02/16/22 0500)  Height: 6' 2" (188 cm) (02/15/22 0900)    Procedures    TXP LVAD INTERROGATIONS 2/16/2022 2/15/2022 2/15/2022 2/15/2022 2/15/2022 2/14/2022 2/14/2022   Type HeartMate3 HeartMate3 HeartMate3 HeartMate3 HeartMate3 HeartMate3 HeartMate3   Flow 4.6 4.5 4.1 3.9 4.0 4.3 4.2   Speed 5000 5000 5000 5000 5000 5000 5050   PI 3.6 3.2 6.6 5.8 6.2 3.9 4.5   Power (Edwards) 3.4 3.3 3.4 3.3 3.5 3.3 3.3   LSL 4600 - 4600 4600 4600 4600 4600   Pulsatility No Pulse Intermittent pulse Intermittent pulse - - - -   Interrogation of Ventricular assist device was performed with physician analysis of device parameters and review of device function. I have personally reviewed the interrogation findings and agree with findings as stated.     2/16/2022  "

## 2022-02-16 NOTE — PROGRESS NOTES
Pt in [place] with therapy.  Assisted pt back to his room via wheelchair. Reminded him to have his wife bring his emergency equipment at the time of his discharge.  Pt verbalized understanding and agreement.

## 2022-02-17 ENCOUNTER — TELEPHONE (OUTPATIENT)
Dept: NEUROSURGERY | Facility: CLINIC | Age: 67
End: 2022-02-17
Payer: MEDICARE

## 2022-02-17 VITALS
WEIGHT: 156.75 LBS | OXYGEN SATURATION: 100 % | BODY MASS INDEX: 20.12 KG/M2 | HEIGHT: 74 IN | TEMPERATURE: 98 F | RESPIRATION RATE: 18 BRPM | SYSTOLIC BLOOD PRESSURE: 113 MMHG | HEART RATE: 59 BPM | DIASTOLIC BLOOD PRESSURE: 57 MMHG

## 2022-02-17 LAB
ANION GAP SERPL CALC-SCNC: 8 MMOL/L (ref 8–16)
ANION GAP SERPL CALC-SCNC: 8 MMOL/L (ref 8–16)
APTT BLDCRRT: 32.4 SEC (ref 21–32)
APTT BLDCRRT: 32.4 SEC (ref 21–32)
BASOPHILS # BLD AUTO: 0.04 K/UL (ref 0–0.2)
BASOPHILS # BLD AUTO: 0.04 K/UL (ref 0–0.2)
BASOPHILS NFR BLD: 0.5 % (ref 0–1.9)
BASOPHILS NFR BLD: 0.5 % (ref 0–1.9)
BUN SERPL-MCNC: 28 MG/DL (ref 8–23)
BUN SERPL-MCNC: 28 MG/DL (ref 8–23)
CALCIUM SERPL-MCNC: 9.1 MG/DL (ref 8.7–10.5)
CALCIUM SERPL-MCNC: 9.1 MG/DL (ref 8.7–10.5)
CHLORIDE SERPL-SCNC: 103 MMOL/L (ref 95–110)
CHLORIDE SERPL-SCNC: 103 MMOL/L (ref 95–110)
CO2 SERPL-SCNC: 27 MMOL/L (ref 23–29)
CO2 SERPL-SCNC: 27 MMOL/L (ref 23–29)
CREAT SERPL-MCNC: 0.8 MG/DL (ref 0.5–1.4)
CREAT SERPL-MCNC: 0.8 MG/DL (ref 0.5–1.4)
DIFFERENTIAL METHOD: ABNORMAL
DIFFERENTIAL METHOD: ABNORMAL
EOSINOPHIL # BLD AUTO: 0.2 K/UL (ref 0–0.5)
EOSINOPHIL # BLD AUTO: 0.2 K/UL (ref 0–0.5)
EOSINOPHIL NFR BLD: 2.6 % (ref 0–8)
EOSINOPHIL NFR BLD: 2.6 % (ref 0–8)
ERYTHROCYTE [DISTWIDTH] IN BLOOD BY AUTOMATED COUNT: 15.4 % (ref 11.5–14.5)
ERYTHROCYTE [DISTWIDTH] IN BLOOD BY AUTOMATED COUNT: 15.4 % (ref 11.5–14.5)
EST. GFR  (AFRICAN AMERICAN): >60 ML/MIN/1.73 M^2
EST. GFR  (AFRICAN AMERICAN): >60 ML/MIN/1.73 M^2
EST. GFR  (NON AFRICAN AMERICAN): >60 ML/MIN/1.73 M^2
EST. GFR  (NON AFRICAN AMERICAN): >60 ML/MIN/1.73 M^2
GLUCOSE SERPL-MCNC: 137 MG/DL (ref 70–110)
GLUCOSE SERPL-MCNC: 137 MG/DL (ref 70–110)
HCT VFR BLD AUTO: 28.9 % (ref 40–54)
HCT VFR BLD AUTO: 28.9 % (ref 40–54)
HGB BLD-MCNC: 8.9 G/DL (ref 14–18)
HGB BLD-MCNC: 8.9 G/DL (ref 14–18)
IMM GRANULOCYTES # BLD AUTO: 0.02 K/UL (ref 0–0.04)
IMM GRANULOCYTES # BLD AUTO: 0.02 K/UL (ref 0–0.04)
IMM GRANULOCYTES NFR BLD AUTO: 0.2 % (ref 0–0.5)
IMM GRANULOCYTES NFR BLD AUTO: 0.2 % (ref 0–0.5)
INR PPP: 1.7 (ref 0.8–1.2)
INR PPP: 1.7 (ref 0.8–1.2)
LDH SERPL L TO P-CCNC: 191 U/L (ref 110–260)
LYMPHOCYTES # BLD AUTO: 1.8 K/UL (ref 1–4.8)
LYMPHOCYTES # BLD AUTO: 1.8 K/UL (ref 1–4.8)
LYMPHOCYTES NFR BLD: 21.9 % (ref 18–48)
LYMPHOCYTES NFR BLD: 21.9 % (ref 18–48)
MAGNESIUM SERPL-MCNC: 2 MG/DL (ref 1.6–2.6)
MAGNESIUM SERPL-MCNC: 2 MG/DL (ref 1.6–2.6)
MCH RBC QN AUTO: 28.8 PG (ref 27–31)
MCH RBC QN AUTO: 28.8 PG (ref 27–31)
MCHC RBC AUTO-ENTMCNC: 30.8 G/DL (ref 32–36)
MCHC RBC AUTO-ENTMCNC: 30.8 G/DL (ref 32–36)
MCV RBC AUTO: 94 FL (ref 82–98)
MCV RBC AUTO: 94 FL (ref 82–98)
MONOCYTES # BLD AUTO: 0.9 K/UL (ref 0.3–1)
MONOCYTES # BLD AUTO: 0.9 K/UL (ref 0.3–1)
MONOCYTES NFR BLD: 10.4 % (ref 4–15)
MONOCYTES NFR BLD: 10.4 % (ref 4–15)
NEUTROPHILS # BLD AUTO: 5.4 K/UL (ref 1.8–7.7)
NEUTROPHILS # BLD AUTO: 5.4 K/UL (ref 1.8–7.7)
NEUTROPHILS NFR BLD: 64.4 % (ref 38–73)
NEUTROPHILS NFR BLD: 64.4 % (ref 38–73)
NRBC BLD-RTO: 0 /100 WBC
NRBC BLD-RTO: 0 /100 WBC
PHOSPHATE SERPL-MCNC: 2.6 MG/DL (ref 2.7–4.5)
PHOSPHATE SERPL-MCNC: 2.6 MG/DL (ref 2.7–4.5)
PLATELET # BLD AUTO: 142 K/UL (ref 150–450)
PLATELET # BLD AUTO: 142 K/UL (ref 150–450)
PMV BLD AUTO: 10.9 FL (ref 9.2–12.9)
PMV BLD AUTO: 10.9 FL (ref 9.2–12.9)
POCT GLUCOSE: 116 MG/DL (ref 70–110)
POCT GLUCOSE: 186 MG/DL (ref 70–110)
POTASSIUM SERPL-SCNC: 3.9 MMOL/L (ref 3.5–5.1)
POTASSIUM SERPL-SCNC: 3.9 MMOL/L (ref 3.5–5.1)
PROTHROMBIN TIME: 17.2 SEC (ref 9–12.5)
PROTHROMBIN TIME: 17.2 SEC (ref 9–12.5)
RBC # BLD AUTO: 3.09 M/UL (ref 4.6–6.2)
RBC # BLD AUTO: 3.09 M/UL (ref 4.6–6.2)
SODIUM SERPL-SCNC: 138 MMOL/L (ref 136–145)
SODIUM SERPL-SCNC: 138 MMOL/L (ref 136–145)
WBC # BLD AUTO: 8.35 K/UL (ref 3.9–12.7)
WBC # BLD AUTO: 8.35 K/UL (ref 3.9–12.7)

## 2022-02-17 PROCEDURE — 93750 PR INTERROGATE VENT ASSIST DEV, IN PERSON, W PHYSICIAN ANALYSIS: ICD-10-PCS | Mod: ,,, | Performed by: INTERNAL MEDICINE

## 2022-02-17 PROCEDURE — 97116 GAIT TRAINING THERAPY: CPT

## 2022-02-17 PROCEDURE — 27000248 HC VAD-ADDITIONAL DAY

## 2022-02-17 PROCEDURE — 84100 ASSAY OF PHOSPHORUS: CPT | Performed by: HOSPITALIST

## 2022-02-17 PROCEDURE — 85730 THROMBOPLASTIN TIME PARTIAL: CPT | Performed by: HOSPITALIST

## 2022-02-17 PROCEDURE — 80048 BASIC METABOLIC PNL TOTAL CA: CPT | Performed by: HOSPITALIST

## 2022-02-17 PROCEDURE — 25000003 PHARM REV CODE 250: Performed by: HOSPITALIST

## 2022-02-17 PROCEDURE — 25000003 PHARM REV CODE 250: Performed by: PHYSICIAN ASSISTANT

## 2022-02-17 PROCEDURE — 97530 THERAPEUTIC ACTIVITIES: CPT

## 2022-02-17 PROCEDURE — 83735 ASSAY OF MAGNESIUM: CPT | Performed by: HOSPITALIST

## 2022-02-17 PROCEDURE — 85025 COMPLETE CBC W/AUTO DIFF WBC: CPT | Performed by: HOSPITALIST

## 2022-02-17 PROCEDURE — 25000003 PHARM REV CODE 250: Performed by: STUDENT IN AN ORGANIZED HEALTH CARE EDUCATION/TRAINING PROGRAM

## 2022-02-17 PROCEDURE — 94761 N-INVAS EAR/PLS OXIMETRY MLT: CPT

## 2022-02-17 PROCEDURE — 93750 INTERROGATION VAD IN PERSON: CPT | Mod: ,,, | Performed by: INTERNAL MEDICINE

## 2022-02-17 PROCEDURE — 83615 LACTATE (LD) (LDH) ENZYME: CPT | Performed by: HOSPITALIST

## 2022-02-17 PROCEDURE — 85610 PROTHROMBIN TIME: CPT | Performed by: HOSPITALIST

## 2022-02-17 PROCEDURE — 36415 COLL VENOUS BLD VENIPUNCTURE: CPT | Performed by: HOSPITALIST

## 2022-02-17 RX ORDER — WARFARIN SODIUM 5 MG/1
5 TABLET ORAL DAILY
Qty: 90 TABLET | Refills: 3 | Status: SHIPPED | OUTPATIENT
Start: 2022-02-17 | End: 2023-03-03 | Stop reason: SDUPTHER

## 2022-02-17 RX ORDER — ACETAMINOPHEN 500 MG/1
1000 CAPSULE, LIQUID FILLED ORAL EVERY 8 HOURS PRN
Refills: 0 | Status: ON HOLD | COMMUNITY
Start: 2022-02-17 | End: 2022-09-14

## 2022-02-17 RX ORDER — FUROSEMIDE 80 MG/1
80 TABLET ORAL 2 TIMES DAILY
Qty: 60 TABLET | Refills: 11 | Status: SHIPPED | OUTPATIENT
Start: 2022-02-17 | End: 2022-07-13

## 2022-02-17 RX ADMIN — Medication 400 MG: at 08:02

## 2022-02-17 RX ADMIN — FUROSEMIDE 80 MG: 80 TABLET ORAL at 08:02

## 2022-02-17 RX ADMIN — LISINOPRIL 10 MG: 10 TABLET ORAL at 08:02

## 2022-02-17 RX ADMIN — BACLOFEN 10 MG: 10 TABLET ORAL at 08:02

## 2022-02-17 RX ADMIN — SENNOSIDES AND DOCUSATE SODIUM 1 TABLET: 50; 8.6 TABLET ORAL at 08:02

## 2022-02-17 RX ADMIN — ATORVASTATIN CALCIUM 80 MG: 20 TABLET, FILM COATED ORAL at 08:02

## 2022-02-17 RX ADMIN — FAMOTIDINE 20 MG: 20 TABLET ORAL at 08:02

## 2022-02-17 NOTE — SUBJECTIVE & OBJECTIVE
Interval History: No events overnight, INR therapeutic at 1.7. CTH done yesterday with therapeutic INR improving. Plan for d/c today.    Continuous Infusions:   sodium chloride 0.9% 5 mL/hr (02/08/22 0914)     Scheduled Meds:   atorvastatin  80 mg Oral Daily    baclofen  10 mg Oral TID    famotidine  20 mg Oral BID    furosemide  80 mg Oral BID    lisinopriL  10 mg Oral Daily    lisinopriL  20 mg Oral QHS    magnesium oxide  400 mg Oral BID    polyethylene glycol  17 g Oral Daily    senna-docusate 8.6-50 mg  1 tablet Oral Daily    warfarin  5 mg Oral Daily     PRN Meds:sodium chloride, acetaminophen, dextrose 10%, glucagon (human recombinant), insulin aspart U-100, oxyCODONE-acetaminophen    Review of patient's allergies indicates:   Allergen Reactions    Pcn [penicillins] Hives     Objective:     Vital Signs (Most Recent):  Temp: 98.3 °F (36.8 °C) (02/17/22 0753)  Pulse: 87 (02/17/22 0753)  Resp: 19 (02/17/22 0753)  BP: 106/72 (02/17/22 0753)  SpO2: 100 % (02/17/22 0753) Vital Signs (24h Range):  Temp:  [97.2 °F (36.2 °C)-98.6 °F (37 °C)] 98.3 °F (36.8 °C)  Pulse:  [] 87  Resp:  [15-20] 19  SpO2:  [95 %-100 %] 100 %  BP: ()/(0-100) 106/72     Patient Vitals for the past 72 hrs (Last 3 readings):   Weight   02/17/22 0415 83.7 kg (184 lb 8.4 oz)   02/16/22 0500 77.8 kg (171 lb 8.3 oz)   02/15/22 0900 77.6 kg (171 lb)     Body mass index is 23.69 kg/m².      Intake/Output Summary (Last 24 hours) at 2/17/2022 0901  Last data filed at 2/17/2022 0415  Gross per 24 hour   Intake 1117 ml   Output 600 ml   Net 517 ml       Hemodynamic Parameters:           Physical Exam  Constitutional:       General: He is not in acute distress.     Appearance: He is well-developed.   HENT:      Head: Normocephalic and atraumatic.   Eyes:      General:         Right eye: No discharge.         Left eye: No discharge.   Cardiovascular:      Comments: Smooth VAD hum  Pulmonary:      Effort: Pulmonary effort is normal.  No respiratory distress.   Abdominal:      General: There is no distension.      Palpations: Abdomen is soft.   Musculoskeletal:         General: No deformity.      Cervical back: Neck supple.   Skin:     General: Skin is warm and dry.   Neurological:      General: No focal deficit present.      Mental Status: He is alert and oriented to person, place, and time.      Comments: Follows commands   Generalized deconditioning     Psychiatric:         Mood and Affect: Mood normal.         Behavior: Behavior normal. Behavior is cooperative.         Thought Content: Thought content normal.         Cognition and Memory: Cognition is not impaired.         Judgment: Judgment normal.         Significant Labs:  CBC:  Recent Labs   Lab 02/15/22  0413 02/16/22  0509 02/17/22  0255   WBC 7.84  7.84 7.76  7.76 8.35  8.35   RBC 3.07*  3.07* 3.42*  3.42* 3.09*  3.09*   HGB 9.0*  9.0* 9.6*  9.6* 8.9*  8.9*   HCT 28.2*  28.2* 31.2*  31.2* 28.9*  28.9*     158 182  182 142*  142*   MCV 92  92 91  91 94  94   MCH 29.3  29.3 28.1  28.1 28.8  28.8   MCHC 31.9*  31.9* 30.8*  30.8* 30.8*  30.8*     BNP:  Recent Labs   Lab 02/11/22  0437 02/14/22  0448 02/16/22  0509   * 284* 309*     CMP:  Recent Labs   Lab 02/11/22  0437 02/12/22  0239 02/14/22  0448 02/14/22  0448 02/15/22  0413 02/16/22  0509 02/17/22  0255   *  152*   < > 126*  126*   < > 130*  130* 118*  118* 137*  137*   CALCIUM 9.0  9.0   < > 9.3  9.3   < > 9.2  9.2 9.4  9.4 9.1  9.1   ALBUMIN 2.6*  2.6*  --  2.8*  2.8*  --   --  2.8*  2.8*  --    PROT 7.7  7.7  --  8.1  8.1  --   --  8.1  8.1  --    *  134*   < > 138  138   < > 138  138 138  138 138  138   K 3.9  3.9   < > 4.1  4.1   < > 3.9  3.9 4.0  4.0 3.9  3.9   CO2 25  25   < > 25  25   < > 26  26 29  29 27  27   CL 99  99   < > 102  102   < > 102  102 100  100 103  103   BUN 23  23   < > 24*  24*   < > 26*  26* 25*  25* 28*  28*    CREATININE 0.8  0.8   < > 0.8  0.8   < > 0.8  0.8 0.9  0.9 0.8  0.8   ALKPHOS 137*  137*  --  128  128  --   --  130  130  --    ALT 8*  8*  --  9*  9*  --   --  8*  8*  --    AST 15  15  --  16  16  --   --  15  15  --    BILITOT 1.4*  1.4*  --  1.6*  1.6*  --   --  1.4*  1.4*  --     < > = values in this interval not displayed.      Coagulation:   Recent Labs   Lab 02/15/22  0413 02/16/22  0509 02/17/22  0255   INR 1.7*  1.7* 1.6*  1.6* 1.7*  1.7*   APTT 31.3  31.3 29.9  29.9 32.4*  32.4*     LDH:  Recent Labs   Lab 02/15/22  0413 02/16/22  0509 02/17/22  0255    255 191     Microbiology:  Microbiology Results (last 7 days)     ** No results found for the last 168 hours. **          I have reviewed all pertinent labs within the past 24 hours.    Estimated Creatinine Clearance: 105.6 mL/min (based on SCr of 0.8 mg/dL).    Diagnostic Results:  I have reviewed and interpreted all pertinent imaging results/findings within the past 24 hours.

## 2022-02-17 NOTE — PLAN OF CARE
Patient is ready for discharge. Patient stable alert and oriented. IVs removed. No complaints of pain. Discussed discharge plan. Reviewed medications and side effects, appointments, and answered questions with patient and family. Wife brought emergency LVAD bag to bedside, Kirk came to  loaner LVAD equipment. See discharge inventory in flowsheet

## 2022-02-17 NOTE — ASSESSMENT & PLAN NOTE
-S/P DT HM3 1/13/21  -Current speed 5000  -INR goal lowered 1.5-2 for now. Current INR therapeutic. Coumadin restarted as above.  -ASA stopped.   -LDH stable    Procedure: Device Interrogation Including analysis of device parameters  Current Settings: Ventricular Assist Device  Review of device function is stable/unstable stable         TXP LVAD INTERROGATIONS 2/17/2022 2/16/2022 2/16/2022 2/16/2022 2/15/2022 2/15/2022 2/15/2022   Type HeartMate3 HeartMate3 HeartMate3 HeartMate3 HeartMate3 HeartMate3 HeartMate3   Flow 4.3 4.3 4.1 4.6 4.5 4.1 3.9   Speed 5050 5000 5000 5000 5000 5000 5000   PI 4.7 3.9 6.1 3.6 3.2 6.6 5.8   Power (Edwards) 3.4 3.4 3.4 3.4 3.3 3.4 3.3   LSL 4600 4600 4600 4600 - 4600 4600   Pulsatility No Pulse No Pulse No Pulse No Pulse Intermittent pulse Intermittent pulse -

## 2022-02-17 NOTE — PROGRESS NOTES
DISCHARGE    SW placed call to [facility] ([phone], opt 4) and spoke with [place] to advise that Pt will d/c to home today. Requested  company for visit on 2/18.    SW to pt's room for discharge today.  Pt presents as AAO x4, calm, cooperative, and asking and answering questions appropriately.  Pt verbalizes understanding and agreement with plan for d/c to home today with HH.  Pt reports Pt's spouse & family will transport Pt home today. Spouse is on her way. Pt voices deep appreciation for team's support during this hospitalization. Pt reports coping adequately at this time, and denies any needs or concerns to SW.    SW rec'd notification from [facility] that [facility] (ph: [phone], f: [phone]) will care for Pt. SW notified them of d/c today and faxed  orders & hospital records.  SW providing ongoing psychosocial and counseling support, education, resources, assistance, and discharge planning as indicated.  SW remains available.

## 2022-02-17 NOTE — PT/OT/SLP PROGRESS
"Physical Therapy   Progress Note    Patient Name:  Rocco France  MRN: [id number]    Admit Date: 1/27/2022  Admitting Diagnosis:  Bilateral subdural hematomas  Length of Stay: 21 days  Recent Surgery: * No surgery found *      Recommendations:     Discharge Recommendations: Home Health PT with caregiver supervision   Equipment recommendations: rolling walker and 3-in-1 commode  Barriers to discharge: Fall risk     Assessment:     Rocco France is a 66 y.o. male admitted to [facility] on 1/27/2022 with medical diagnosis of Bilateral subdural hematomas. Pt presents with weakness,impaired endurance,impaired functional mobilty,gait instability,impaired balance,pain. Pt is progressing towards goals, but has not yet reached prior level of function. Rocco France would benefit from continued acute PT intervention to improve quality of life, focus on recovery of impairments, provide patient/caregiver education, reduce fall risk, and maximize (I) and safety with functional mobility. Once medically stable, recommending pt discharge to home with Home Health PT.  Pt reported having strong family support upon discharge and verbalized understanding that supervision is recommended during mobility to reduce fall risk.     Rehab Prognosis: Good    Plan:     During this hospitalization, patient to be seen 3 x/week to address the identified rehab impairments via gait training,therapeutic activities,therapeutic exercises,neuromuscular re-education and progress towards stated goals.     Plan of Care Expires:  03/03/22  Plan of Care reviewed with: patient    This plan of care has been discussed with the patient/caregiver, who was included in its development and is in agreement with the identified goals and treatment plan.     Subjective     Communicated with RN prior to session.  Patient found HOB elevated upon PT entry to room, agreeable to therapy session.    Patient/Family Comments/goals: "I'm supposed to go home today, my wife is going " "to come pick me up" "I just never thought I would have to depend on people to help me"    Pain/Comfort:  · Pain Rating 1: 0/10  · Pain Rating Post-Intervention 1: 0/10    Patients cultural, spiritual, Mormonism conflicts given the current situation: None identified     Objective:     Patient found with: telemetry,peripheral IV,LVAD    General Precautions: Standard, fall,LVAD   Orthopedic Precautions:N/A   Braces: N/A   Oxygen Device: room air    Cognition:   Pt is Alert and Cooperative during session.    Therapist provided skilled verbal and tactile cueing to facilitate the following functional mobility tasks. Listed tasks are focused on recovery of impairments and improving pt's independence and efficiency with bed mobility, transfers and ambulation as able.     Bed Mobility:  · Supine > Sit: Modified Independent  · Sit > Supine: Modified Independent    Transfers:   · Sit <> Stand Transfer: Stand-by Assistance from EOB with RW   · Bed <> Chair: Stand-by Assistance with RW                  Gait:  · Distance: ~30 ft. (distance limited 2/2 pt requesting to remain on LVAD wall power as he is awaiting spouse to bring his home batteries prior to switching over)   · Assistance level: Stand-by Assistance  · Assistive Device: rolling walker  · Gait Assessment: decreased step length , decreased gait speed, narrow base of support and unsteady gait. No overt LOB.     Balance:  · Dynamic Sitting: NORMAL: No deviations seen in posture held dynamically  · Standing:  · Static: FAIR: Maintains without assist but unable to take challenges   · Dynamic: FAIR: Needs CONTACT GUARD during gait    Outcome Measure: AM-PAC 6 CLICK MOBILITY  Total Score:21     Patient/Caregiver Education and Additional Therapeutic Activities/Exercises     Therapist facilitated progression of gait training to improve gait stability, endurance, and independence with functional ambulation.     Provided pt/caregiver education regarding:    PT POC and goals " for therapy    Safety with mobility and fall risk    Safe management of AD as needed    Energy conservation techniques    Instruction on use of call button and importance of calling nursing staff for assistance with mobility     Patient/caregiver able to verbalize understanding; will follow-up with pt/caregiver during current admit for additional questions/concerns within scope of practice.     White board updated.     Patient left HOB elevated with all lines intact, call button in reach and RN notified.    Goals:     Multidisciplinary Problems     Physical Therapy Goals        Problem: Physical Therapy Goal    Goal Priority Disciplines Outcome Goal Variances Interventions   Physical Therapy Goal     PT, PT/OT Ongoing, Progressing     Description: Goals to be met by: 2022      Patient will increase functional independence with mobility by performin. Supine to sit with Modified [place]- met 2/15/2022  2. Sit to stand transfer with Modified [place]  3. Bed to chair transfer with Modified [place] using the least restrictive device.   4. Gait  x 150 feet with Modified [place] using the least restrictive device.                       Time Tracking:       PT Received On: 22  PT Start Time: 909     PT Stop Time: 936  PT Total Time (min): 27 min     Billable Minutes: Gait Training 10 min and Therapeutic Activity 17 min    2022

## 2022-02-17 NOTE — PROGRESS NOTES
DISCHARGE NOTE:    Rocco France is a 66 y.o. male s/p HM3 LVAD admitted after a fall.     Past Medical History:   Diagnosis Date    CLARISSE (acute kidney injury) 1/11/2021    CHF (congestive heart failure)     Chronic combined systolic and diastolic congestive heart failure 1/9/2021    Coronary artery disease     Diabetes mellitus     Hypertension     Kidney stones        Hospital Course: He was intubated briefly for respiratory failure and tested positive for COVID. He was treated for a traumatic intracranial hemorrhage. He was discharged in good condition with orders to decrease intensity of anticoagulation as an outpatient.     Pharmacy Interventions/Recommendations:  1) INR Goal: 1.5-2    2) Antiplatelet Agents: None. ASA discontinued this admit.     3) Heparin Bridging:  No     4) INR Follow-Up/Discharge Needs:  Repeat INR next week.     See list of discharge medication for dosing instructions.     Rocco France and his caregiver verbalized their understanding and had the opportunity to ask questions.      Discharge Medications:     Medication List      START taking these medications    acetaminophen 500 mg Cap  Commonly known as: TYLENOL  Take 2 capsules (1,000 mg total) by mouth every 8 (eight) hours as needed (Pain).     furosemide 80 MG tablet  Commonly known as: LASIX  Take 1 tablet (80 mg total) by mouth 2 (two) times daily.        CHANGE how you take these medications    warfarin 5 MG tablet  Commonly known as: COUMADIN  Take 1 tablet (5 mg total) by mouth Daily.  What changed:   · how much to take  · how to take this  · when to take this  · additional instructions        CONTINUE taking these medications    atorvastatin 80 MG tablet  Commonly known as: LIPITOR  Take 1 tablet (80 mg total) by mouth once daily.     docusate sodium 100 MG capsule  Commonly known as: COLACE     lisinopriL 20 MG tablet  Commonly known as: PRINIVIL,ZESTRIL  Take 1/2 tablet (10 mg total) by mouth every morning and take  1 tablet (20 mg total) every evening.     magnesium oxide 400 mg (241.3 mg magnesium) tablet  Commonly known as: MAG-OX  Take 1 tablet (400 mg total) by mouth 3 (three) times daily.     polyethylene glycol 17 gram Pwpk  Commonly known as: GLYCOLAX  Mix 1 packet (17 g) with liquid and take by mouth daily as needed.     SITagliptin 100 MG Tab  Commonly known as: JANUVIA  Take 1 tablet (100 mg total) by mouth once daily.        STOP taking these medications    aspirin 325 MG tablet     torsemide 20 MG Tab  Commonly known as: DEMADEX           Where to Get Your Medications      These medications were sent to [id number] - [place] - [address]  [address] [facility] [place] [address]    Phone: [phone]   · furosemide 80 MG tablet  · warfarin 5 MG tablet     You can get these medications from any pharmacy    You don't need a prescription for these medications  · acetaminophen 500 mg Cap

## 2022-02-17 NOTE — PLAN OF CARE
Patient is therapeutic (INR at 1.7) on coumadin. Reviewed repeat CT head for stability of SDH. Right sided SDH slightly less dense and has decreased in size when compared to prior imaging. Improvement in mass effect and midline shift as well. No evidence of new hemorrhage or acute abnormalities. Patient is to follow up with neurosurgery in 2 weeks with new CT head. We will arrange. Please contact neurosurgery with any questions/concerns.     Alexandra Beltre PA-C  Neurosurgery   [facility]

## 2022-02-17 NOTE — PROGRESS NOTES
Visited patient at bedside. Patient's wife brought his emergency bag containing his back up controller, 2 batteries and 2 battery clips. Thus, the loaner emergency bag with equipment that was issued upon hospital arrival have no been retrieved, cleaned, and placed back in the LVAD equipment room.

## 2022-02-17 NOTE — DISCHARGE SUMMARY
[facility] - Cardiology Stepdown  Heart Transplant  Discharge Summary      Patient Name: Rocco France  MRN: [id number]  Admission Date: 1/27/2022  Hospital Length of Stay: 21 days  Discharge Date and Time: 02/17/2022 11:23 AM  Attending Physician: Manuel Ramos Jr.,*   Discharging Provider: Valenitna Jose PA-C  Primary Care Provider: Teresa Mendoza NP     HPI: 67 yo BM with stage D CHF due to NICMP s/p HM3, s/p ICD, HTN, HLD, T2DM was transferred from outside hospital emergency room after he was found to have subdural hematoma/subarachnoid hemorrhage.  Patient presented to the ER after he was found to have a syncopal event.  Also patient had respiratory distress on arrival for which he was intubated.  Patient was transferred here for further evaluation.  On arrival patient is intubated and is on propofol.  Neurosurgery and Neuro Critical Care were immediately informed.  Neurosurgery recommended to repeat CT after reviewing the prior CT done in the ER in [place].  INR on arrival is 1.6.  He also had a low flow with a increased PI around 4:00 p.m. this evening.  According to wife patient was complaining of headache for the last 2 weeks..  He went to post office this [place] and she does not know what exactly happened..  ICD was interrogated and did not show any VT/VF.       * No surgery found *     Hospital Course: Patient transferred from OSH after syncopal event (unsure cause) with bilateral subdurals and  subarachnoid bleed. He came intubated from OSH 2/2 neurologic decline but also incidentally covid positive. Extubated without complication and has no covid sxs. Briefly required levophed for hypotension while intubated. He received remdesivir treatment. NS and NCC consulted upon arrival. ASA discontinued, coumadin held for over a week and reinitiated. Monitored closely as INR uptrended. INR goal decreased to 1.5-2.0. Repeat CTH with therapeutic INR 1.6 with continued improvement. Adjusted po lasix regimen  and slowly reintroduced anti-hypertensive regimen back to previous home dosing with good control.       Goals of Care Treatment Preferences:  Code Status: Full Code    Health care agent: Meme France  Health care agent number: [phone]    Living Will: Yes              Consults (From admission, onward)        Status Ordering Provider     Inpatient consult to Registered Dietitian/Nutritionist  Once        Provider:  (Not yet assigned)    Completed ALLEGRA TURNER     Inpatient consult to Physical Medicine Rehab  Once        Provider:  (Not yet assigned)    Completed VELMA LANDRUM     Inpatient consult to Electrophysiology  Once        Provider:  (Not yet assigned)    Completed MIC RASHID     Inpatient consult to Neuro Critical Care  Once        Provider:  (Not yet assigned)    Completed SHARYN CHAND     Inpatient consult to Neurosurgery  Once        Provider:  (Not yet assigned)    Completed SHARYN CHAND          Significant Diagnostic Studies:   CT Head 1/27:   1. Evolving acute subdural hemorrhage along the right cerebral convexity and tracking bilaterally along the falx and tentorium.  No associated mass effect or midline shift.  2. Acute subarachnoid hemorrhage involving all basal cisterns.  3. Sinus disease.    CT Head 2/16:   1. Evolving thin right cerebral convexity subdural hematoma, slightly less dense and decreased in size compared to prior CT 02/12/2022.  Slight interval improvement in mild mass effect and minimal leftward midline shift.  2.No evidence of new new hemorrhage or significant new abnormal parenchymal attenuation..  3. Continued paranasal sinus disease as described above.    Pending Diagnostic Studies:     Procedure Component Value Units Date/Time    APTT [[id number]]     Order Status: Sent Lab Status: No result     Specimen: Blood     Basic metabolic panel [[id number]]     Order Status: Sent Lab Status: No result     Specimen: Blood     Brain natriuretic peptide  [[id number]]     Order Status: Sent Lab Status: No result     Specimen: Blood     C-reactive protein [[id number]]     Order Status: Sent Lab Status: No result     Specimen: Blood     CBC auto differential [[id number]]     Order Status: Sent Lab Status: No result     Specimen: Blood     Hepatic function panel [[id number]]     Order Status: Sent Lab Status: No result     Specimen: Blood     Lactate dehydrogenase [[id number]]     Order Status: Sent Lab Status: No result     Specimen: Blood     Magnesium [[id number]]     Order Status: Sent Lab Status: No result     Specimen: Blood     Osmolality, Serum [[id number]] Collected: 01/28/22 1113    Order Status: Sent Lab Status: In process Updated: 01/28/22 1114    Specimen: Blood     Phosphorus [[id number]]     Order Status: Sent Lab Status: No result     Specimen: Blood     Prealbumin [[id number]]     Order Status: Sent Lab Status: No result     Specimen: Blood     Protime-INR [[id number]]     Order Status: Sent Lab Status: No result     Specimen: Blood         Final Active Diagnoses:    Diagnosis Date Noted POA    PRINCIPAL PROBLEM:  Bilateral subdural hematomas [S06.5X9A] 01/27/2022 Yes    Subdural bleeding [I62.00]  Unknown    Subarachnoid hemorrhage [I60.9]  Yes    Acute on chronic combined systolic (congestive) and diastolic (congestive) heart failure [I50.43]  Yes    PSVT (paroxysmal supraventricular tachycardia) [I47.1] 02/03/2022 Yes    Tachycardia [R00.0]  Yes    Acute respiratory failure requiring reintubation [J96.00] 01/28/2022 Unknown    Subarachnoid bleed [I60.9] 01/27/2022 Yes    LVAD (left ventricular assist device) present [Z95.811] 01/13/2021 Not Applicable    Chronic combined systolic (congestive) and diastolic (congestive) heart failure [I50.42] 01/09/2021 Yes    Essential hypertension [I10] 09/25/2015 Yes    NICM (nonischemic cardiomyopathy) [I42.8] 09/25/2015 Yes      Problems Resolved During this Admission:      Discharged Condition:  stable    Disposition: Home-Health Care [facility]    Follow Up:    Patient Instructions:      Notify your health care provider if you experience any of the following:  persistent nausea and vomiting or diarrhea     Notify your health care provider if you experience any of the following:  severe uncontrolled pain     Notify your health care provider if you experience any of the following:  severe persistent headache     Notify your health care provider if you experience any of the following:  persistent dizziness, light-headedness, or visual disturbances     Notify your health care provider if you experience any of the following:  increased confusion or weakness     Activity as tolerated     Medications:  Reconciled Home Medications:      Medication List      START taking these medications    acetaminophen 500 mg Cap  Commonly known as: TYLENOL  Take 2 capsules (1,000 mg total) by mouth every 8 (eight) hours as needed (Pain).     furosemide 80 MG tablet  Commonly known as: LASIX  Take 1 tablet (80 mg total) by mouth 2 (two) times daily.        CHANGE how you take these medications    warfarin 5 MG tablet  Commonly known as: COUMADIN  Take 1 tablet (5 mg total) by mouth Daily.  What changed:   · how much to take  · how to take this  · when to take this  · additional instructions        CONTINUE taking these medications    atorvastatin 80 MG tablet  Commonly known as: LIPITOR  Take 1 tablet (80 mg total) by mouth once daily.     docusate sodium 100 MG capsule  Commonly known as: COLACE  Take 100 mg by mouth Daily.     lisinopriL 20 MG tablet  Commonly known as: PRINIVIL,ZESTRIL  Take 1/2 tablet (10 mg total) by mouth every morning and take 1 tablet (20 mg total) every evening.     magnesium oxide 400 mg (241.3 mg magnesium) tablet  Commonly known as: MAG-OX  Take 1 tablet (400 mg total) by mouth 3 (three) times daily.     polyethylene glycol 17 gram Pwpk  Commonly known as: GLYCOLAX  Mix 1 packet (17 g) with liquid and take  by mouth daily as needed.     SITagliptin 100 MG Tab  Commonly known as: JANUVIA  Take 1 tablet (100 mg total) by mouth once daily.        STOP taking these medications    aspirin 325 MG tablet     torsemide 20 MG Tab  Commonly known as: DEMADEX            Valentina Jose PA-C  Heart Transplant  [facility] - Cardiology Stepdown

## 2022-02-17 NOTE — PROGRESS NOTES
[facility] - Cardiology Stepdown  Heart Transplant  Progress Note    Patient Name: Rocco France  MRN: [id number]  Admission Date: 1/27/2022  Hospital Length of Stay: 21 days  Attending Physician: Manuel Ramos Jr.,*  Primary Care Provider: Teresa Mendoza NP  Principal Problem:Bilateral subdural hematomas    Subjective:     Interval History: No events overnight, INR therapeutic at 1.7. CTH done yesterday with therapeutic INR improving. Plan for d/c today.    Continuous Infusions:   sodium chloride 0.9% 5 mL/hr (02/08/22 0914)     Scheduled Meds:   atorvastatin  80 mg Oral Daily    baclofen  10 mg Oral TID    famotidine  20 mg Oral BID    furosemide  80 mg Oral BID    lisinopriL  10 mg Oral Daily    lisinopriL  20 mg Oral QHS    magnesium oxide  400 mg Oral BID    polyethylene glycol  17 g Oral Daily    senna-docusate 8.6-50 mg  1 tablet Oral Daily    warfarin  5 mg Oral Daily     PRN Meds:sodium chloride, acetaminophen, dextrose 10%, glucagon (human recombinant), insulin aspart U-100, oxyCODONE-acetaminophen    Review of patient's allergies indicates:   Allergen Reactions    Pcn [penicillins] Hives     Objective:     Vital Signs (Most Recent):  Temp: 98.3 °F (36.8 °C) (02/17/22 0753)  Pulse: 87 (02/17/22 0753)  Resp: 19 (02/17/22 0753)  BP: 106/72 (02/17/22 0753)  SpO2: 100 % (02/17/22 0753) Vital Signs (24h Range):  Temp:  [97.2 °F (36.2 °C)-98.6 °F (37 °C)] 98.3 °F (36.8 °C)  Pulse:  [] 87  Resp:  [15-20] 19  SpO2:  [95 %-100 %] 100 %  BP: ()/(0-100) 106/72     Patient Vitals for the past 72 hrs (Last 3 readings):   Weight   02/17/22 0415 83.7 kg (184 lb 8.4 oz)   02/16/22 0500 77.8 kg (171 lb 8.3 oz)   02/15/22 0900 77.6 kg (171 lb)     Body mass index is 23.69 kg/m².      Intake/Output Summary (Last 24 hours) at 2/17/2022 0901  Last data filed at 2/17/2022 0415  Gross per 24 hour   Intake 1117 ml   Output 600 ml   Net 517 ml       Hemodynamic Parameters:           Physical  Exam  Constitutional:       General: He is not in acute distress.     Appearance: He is well-developed.   HENT:      Head: Normocephalic and atraumatic.   Eyes:      General:         Right eye: No discharge.         Left eye: No discharge.   Cardiovascular:      Comments: Smooth VAD hum  Pulmonary:      Effort: Pulmonary effort is normal. No respiratory distress.   Abdominal:      General: There is no distension.      Palpations: Abdomen is soft.   Musculoskeletal:         General: No deformity.      Cervical back: Neck supple.   Skin:     General: Skin is warm and dry.   Neurological:      General: No focal deficit present.      Mental Status: He is alert and oriented to person, place, and time.      Comments: Follows commands   Generalized deconditioning     Psychiatric:         Mood and Affect: Mood normal.         Behavior: Behavior normal. Behavior is cooperative.         Thought Content: Thought content normal.         Cognition and Memory: Cognition is not impaired.         Judgment: Judgment normal.         Significant Labs:  CBC:  Recent Labs   Lab 02/15/22  0413 02/16/22  0509 02/17/22  0255   WBC 7.84  7.84 7.76  7.76 8.35  8.35   RBC 3.07*  3.07* 3.42*  3.42* 3.09*  3.09*   HGB 9.0*  9.0* 9.6*  9.6* 8.9*  8.9*   HCT 28.2*  28.2* 31.2*  31.2* 28.9*  28.9*     158 182  182 142*  142*   MCV 92  92 91  91 94  94   MCH 29.3  29.3 28.1  28.1 28.8  28.8   MCHC 31.9*  31.9* 30.8*  30.8* 30.8*  30.8*     BNP:  Recent Labs   Lab 02/11/22  0437 02/14/22  0448 02/16/22  0509   * 284* 309*     CMP:  Recent Labs   Lab 02/11/22  0437 02/12/22  0239 02/14/22 0448 02/14/22 0448 02/15/22  0413 02/16/22  0509 02/17/22  0255   *  152*   < > 126*  126*   < > 130*  130* 118*  118* 137*  137*   CALCIUM 9.0  9.0   < > 9.3  9.3   < > 9.2  9.2 9.4  9.4 9.1  9.1   ALBUMIN 2.6*  2.6*  --  2.8*  2.8*  --   --  2.8*  2.8*  --    PROT 7.7  7.7  --  8.1  8.1  --   --  8.1   8.1  --    *  134*   < > 138  138   < > 138  138 138  138 138  138   K 3.9  3.9   < > 4.1  4.1   < > 3.9  3.9 4.0  4.0 3.9  3.9   CO2 25  25   < > 25  25   < > 26  26 29  29 27  27   CL 99  99   < > 102  102   < > 102  102 100  100 103  103   BUN 23  23   < > 24*  24*   < > 26*  26* 25*  25* 28*  28*   CREATININE 0.8  0.8   < > 0.8  0.8   < > 0.8  0.8 0.9  0.9 0.8  0.8   ALKPHOS 137*  137*  --  128  128  --   --  130  130  --    ALT 8*  8*  --  9*  9*  --   --  8*  8*  --    AST 15  15  --  16  16  --   --  15  15  --    BILITOT 1.4*  1.4*  --  1.6*  1.6*  --   --  1.4*  1.4*  --     < > = values in this interval not displayed.      Coagulation:   Recent Labs   Lab 02/15/22  0413 02/16/22  0509 02/17/22  0255   INR 1.7*  1.7* 1.6*  1.6* 1.7*  1.7*   APTT 31.3  31.3 29.9  29.9 32.4*  32.4*     LDH:  Recent Labs   Lab 02/15/22  0413 02/16/22  0509 02/17/22  0255    255 191     Microbiology:  Microbiology Results (last 7 days)     ** No results found for the last 168 hours. **          I have reviewed all pertinent labs within the past 24 hours.    Estimated Creatinine Clearance: 105.6 mL/min (based on SCr of 0.8 mg/dL).    Diagnostic Results:  I have reviewed and interpreted all pertinent imaging results/findings within the past 24 hours.    Assessment and Plan:     67 yo BM with stage D CHF due to NICMP s/p HM3, s/p ICD, HTN, HLD, T2DM was transferred from outside hospital emergency room after he was found to have subdural hematoma/subarachnoid hemorrhage.  Patient presented to the ER after he was found to have a syncopal event.  Also patient had respiratory distress on arrival for which he was intubated.  Patient was transferred here for further evaluation.  On arrival patient is intubated and is on propofol.  Neurosurgery and Neuro Critical Care were immediately informed.  Neurosurgery recommended to repeat CT after reviewing the prior CT done in the ER  in [place].  INR on arrival is 1.6.  He also had a low flow with a increased PI around 4:00 p.m. this evening.  According to wife patient was complaining of headache for the last 2 weeks..  He went to post office this afternoon and she does not know what exactly happened..  ICD was interrogated and did not show any VT/VF.       * Bilateral subdural hematomas  -Patient had a traumatic subdural, subarachnoid hemorrhage after fall on 1/27. Currently no focal deficits  -NCC signed off. NSGY following peripherally.  -INR therapeutic. Coumadin restarted 2/5 at low dose with plans to watch INR rise slowly. PharmD to adjust dosing. Goal INR 1.5-2.0  -Repeat CTH 2/16/22 with INR 1.6 with improvement.  -Goal Na+ > 135, slowly trickled down days after stopping hypertonic saline. Salt tabs given prn. Has since stabilized  -Keppra started as AED. EEG negative for seizures.  -Transcranial dopplers done 1/31 without evidence of vasospasm.  -Continue q4h neurochecks.    Acute respiratory failure requiring reintubation  - Extubated 1/28 and on RA  - Tx'd with remdesivir which was since discontinued. Asymptomatic from covid perspective    LVAD (left ventricular assist device) present  -S/P DT HM3 1/13/21  -Current speed 5000  -INR goal lowered 1.5-2 for now. Current INR therapeutic. Coumadin restarted as above.  -ASA stopped.   -LDH stable    Procedure: Device Interrogation Including analysis of device parameters  Current Settings: Ventricular Assist Device  Review of device function is stable/unstable stable         TXP LVAD INTERROGATIONS 2/17/2022 2/16/2022 2/16/2022 2/16/2022 2/15/2022 2/15/2022 2/15/2022   Type HeartMate3 HeartMate3 HeartMate3 HeartMate3 HeartMate3 HeartMate3 HeartMate3   Flow 4.3 4.3 4.1 4.6 4.5 4.1 3.9   Speed 5050 5000 5000 5000 5000 5000 5000   PI 4.7 3.9 6.1 3.6 3.2 6.6 5.8   Power (Edwards) 3.4 3.4 3.4 3.4 3.3 3.4 3.3   LSL 4600 4600 4600 4600 - 4600 4600   Pulsatility No Pulse No Pulse No Pulse No Pulse  Intermittent pulse Intermittent pulse -       Type 2 diabetes mellitus without complication  -SSI  ACHS     Essential hypertension  -Levophed off. MAP goal >65  -Continue Lisinopril 10qam/20qhs,  doxazosin 2mg BID initiated but discontinued  as BP controlled with adjusted lisinopril dosing. Will monitor closely and reinitiate if elevated Bps return      Valentina Jose PA-C  Heart Transplant  [facility] - Cardiology Stepdown

## 2022-02-17 NOTE — HOSPITAL COURSE
Patient transferred from OSH after syncopal event (unsure cause) with bilateral subdurals and  subarachnoid bleed. He came intubated from OSH 2/2 neurologic decline but also incidentally covid positive. Extubated without complication and has no covid sxs. Briefly required levophed for hypotension while intubated. He received remdesivir treatment. NS and NCC consulted upon arrival. ASA discontinued, coumadin held for over a week and reinitiated. Monitored closely as INR uptrended. INR goal decreased to 1.5-2.0. Repeat CTH with therapeutic INR 1.6 with continued improvement. Adjusted po lasix regimen and slowly reintroduced anti-hypertensive regimen back to previous home dosing with good control.

## 2022-02-17 NOTE — TELEPHONE ENCOUNTER
----- Message from Alexandra Beltre PA-C sent at 2/17/2022 11:31 AM CST -----  Hi,     Can we schedule this patient for 2 week follow up appointment with us with CT head in [facility]? He did not have surgery.    Thanks!

## 2022-02-17 NOTE — ASSESSMENT & PLAN NOTE
-Patient had a traumatic subdural, subarachnoid hemorrhage after fall on 1/27. Currently no focal deficits  -NCC signed off. NSGY following peripherally.  -INR therapeutic. Coumadin restarted 2/5 at low dose with plans to watch INR rise slowly. PharmD to adjust dosing. Goal INR 1.5-2.0  -Repeat CTH 2/16/22 with INR 1.6 with improvement.  -Goal Na+ > 135, slowly trickled down days after stopping hypertonic saline. Salt tabs given prn. Has since stabilized  -Keppra started as AED. EEG negative for seizures.  -Transcranial dopplers done 1/31 without evidence of vasospasm.  -Continue q4h neurochecks.

## 2022-02-17 NOTE — PLAN OF CARE
Plan of care discussed with patient.  Patient ambulating independently, fall precautions in place. LVAD DP and numbers WNL, smooth LVAD hum. Patient has no complaints of pain. Discussed medications and care.  Patient has no questions at this time. Will continue to monitor.

## 2022-02-18 ENCOUNTER — PATIENT OUTREACH (OUTPATIENT)
Dept: ADMINISTRATIVE | Facility: CLINIC | Age: 67
End: 2022-02-18
Payer: MEDICARE

## 2022-02-18 ENCOUNTER — PATIENT MESSAGE (OUTPATIENT)
Dept: ADMINISTRATIVE | Facility: CLINIC | Age: 67
End: 2022-02-18
Payer: MEDICARE

## 2022-02-18 NOTE — PROGRESS NOTES
C3 nurse attempted to contact Rocco France for a TCC post hospital discharge follow up call. No answer. Left voicemail with callback information. The patient has a scheduled appointment with MD Aleksandra on 3/9/2022 @ 2977.

## 2022-02-18 NOTE — PROGRESS NOTES
Pt confirmed hospital discharge warfarin dose of 5mg QHS. PT/INR on 2/21. Confirmed with Trinidad at  and orders faxed

## 2022-02-18 NOTE — PROGRESS NOTES
[place] [facility] [place]. Hospital course per inpatient team: Rocco France is a 66 y.o. male s/p HM3 LVAD admitted after a fall. He was intubated briefly for respiratory failure and tested positive for COVID. He was treated for a traumatic intracranial hemorrhage. He was discharged in good condition with orders to decrease intensity of anticoagulation as an outpatient.      Calendar updated with warfarin dosing during admission. Will request INR Monday through HH.

## 2022-02-19 NOTE — PROGRESS NOTES
C3 nurse attempted to contact Rocco France for a TCC post hospital discharge follow up call. No answer. Patients wife was not at home. The patient has a scheduled appointment with MD Aleksandra on 3/9/2022 @ 1960.

## 2022-02-21 ENCOUNTER — ANTI-COAG VISIT (OUTPATIENT)
Dept: CARDIOLOGY | Facility: CLINIC | Age: 67
End: 2022-02-21
Payer: MEDICARE

## 2022-02-21 DIAGNOSIS — Z95.811 LVAD (LEFT VENTRICULAR ASSIST DEVICE) PRESENT: Primary | ICD-10-CM

## 2022-02-21 LAB — INR PPP: 2.6

## 2022-02-21 PROCEDURE — 93793 ANTICOAG MGMT PT WARFARIN: CPT | Mod: S$GLB,,,

## 2022-02-21 PROCEDURE — 93793 PR ANTICOAGULANT MGMT FOR PT TAKING WARFARIN: ICD-10-PCS | Mod: S$GLB,,,

## 2022-02-21 NOTE — PROGRESS NOTES
C3 nurse spoke with Rocco France for a TCC post hospital discharge follow up call. The patient reports does not have a scheduled HOSFU appointment. C3 nurse was unable to schedule [facility] appointment [facility]. Patient advised to contact their PCP to schedule a HOSPFU within 7 days. Spouse states that patient will see local cardiology on 2/24/2022 Dr. Barajas.

## 2022-02-23 ENCOUNTER — ANTI-COAG VISIT (OUTPATIENT)
Dept: CARDIOLOGY | Facility: CLINIC | Age: 67
End: 2022-02-23
Payer: MEDICARE

## 2022-02-23 DIAGNOSIS — Z95.811 LVAD (LEFT VENTRICULAR ASSIST DEVICE) PRESENT: Primary | ICD-10-CM

## 2022-02-23 LAB — INR PPP: 2.1

## 2022-02-23 PROCEDURE — 93793 PR ANTICOAGULANT MGMT FOR PT TAKING WARFARIN: ICD-10-PCS | Mod: S$GLB,,,

## 2022-02-23 PROCEDURE — 93793 ANTICOAG MGMT PT WARFARIN: CPT | Mod: S$GLB,,,

## 2022-02-28 ENCOUNTER — ANTI-COAG VISIT (OUTPATIENT)
Dept: CARDIOLOGY | Facility: CLINIC | Age: 67
End: 2022-02-28
Payer: MEDICARE

## 2022-02-28 DIAGNOSIS — Z95.811 LVAD (LEFT VENTRICULAR ASSIST DEVICE) PRESENT: Primary | ICD-10-CM

## 2022-02-28 LAB — INR PPP: 2.3

## 2022-02-28 PROCEDURE — 93793 PR ANTICOAGULANT MGMT FOR PT TAKING WARFARIN: ICD-10-PCS | Mod: S$GLB,,,

## 2022-02-28 PROCEDURE — 93793 ANTICOAG MGMT PT WARFARIN: CPT | Mod: S$GLB,,,

## 2022-02-28 NOTE — PROGRESS NOTES
Patient's wife questioned and states he took Coumadin per calendar and denies change to health, medications, or diet.  She denies patient ETOH use.

## 2022-03-03 ENCOUNTER — ANTI-COAG VISIT (OUTPATIENT)
Dept: CARDIOLOGY | Facility: CLINIC | Age: 67
End: 2022-03-03
Payer: MEDICARE

## 2022-03-03 DIAGNOSIS — Z95.811 LVAD (LEFT VENTRICULAR ASSIST DEVICE) PRESENT: Primary | ICD-10-CM

## 2022-03-03 LAB — INR PPP: 2

## 2022-03-03 PROCEDURE — 93793 ANTICOAG MGMT PT WARFARIN: CPT | Mod: S$GLB,,,

## 2022-03-03 PROCEDURE — 93793 PR ANTICOAGULANT MGMT FOR PT TAKING WARFARIN: ICD-10-PCS | Mod: S$GLB,,,

## 2022-03-03 NOTE — PROGRESS NOTES
Anay with [facility] called in a verbal result as: INR -2.0 / PT 19.0 dated today 3/03/22, result to also be faxed

## 2022-03-07 ENCOUNTER — ANTI-COAG VISIT (OUTPATIENT)
Dept: CARDIOLOGY | Facility: CLINIC | Age: 67
End: 2022-03-07
Payer: MEDICARE

## 2022-03-07 DIAGNOSIS — Z95.811 LVAD (LEFT VENTRICULAR ASSIST DEVICE) PRESENT: Primary | ICD-10-CM

## 2022-03-07 LAB — INR PPP: 3.1

## 2022-03-07 PROCEDURE — 93793 PR ANTICOAGULANT MGMT FOR PT TAKING WARFARIN: ICD-10-PCS | Mod: S$GLB,,,

## 2022-03-07 PROCEDURE — 93793 ANTICOAG MGMT PT WARFARIN: CPT | Mod: S$GLB,,,

## 2022-03-08 NOTE — PROGRESS NOTES
Patient wife questioned and confirmed dose - reports patient has a low appetite eating less than usual no other changes.

## 2022-03-10 ENCOUNTER — CLINICAL SUPPORT (OUTPATIENT)
Dept: TRANSPLANT | Facility: CLINIC | Age: 67
End: 2022-03-10
Payer: MEDICARE

## 2022-03-10 ENCOUNTER — HOSPITAL ENCOUNTER (OUTPATIENT)
Dept: PULMONOLOGY | Facility: CLINIC | Age: 67
Discharge: HOME | End: 2022-03-10
Payer: MEDICARE

## 2022-03-10 ENCOUNTER — OFFICE VISIT (OUTPATIENT)
Dept: TRANSPLANT | Facility: CLINIC | Age: 67
End: 2022-03-10
Payer: MEDICARE

## 2022-03-10 ENCOUNTER — ANTI-COAG VISIT (OUTPATIENT)
Dept: CARDIOLOGY | Facility: CLINIC | Age: 67
End: 2022-03-10
Payer: MEDICARE

## 2022-03-10 VITALS — BODY MASS INDEX: 24 KG/M2 | TEMPERATURE: 98 F | HEIGHT: 74 IN | SYSTOLIC BLOOD PRESSURE: 75 MMHG | WEIGHT: 187 LBS

## 2022-03-10 VITALS — WEIGHT: 187 LBS | HEIGHT: 74 IN | BODY MASS INDEX: 24 KG/M2

## 2022-03-10 DIAGNOSIS — I60.9 SUBARACHNOID BLEED: ICD-10-CM

## 2022-03-10 DIAGNOSIS — Z95.811 HEART REPLACED BY HEART ASSIST DEVICE: ICD-10-CM

## 2022-03-10 DIAGNOSIS — Z95.811 LVAD (LEFT VENTRICULAR ASSIST DEVICE) PRESENT: Primary | ICD-10-CM

## 2022-03-10 DIAGNOSIS — Z95.811 LVAD (LEFT VENTRICULAR ASSIST DEVICE) PRESENT: ICD-10-CM

## 2022-03-10 DIAGNOSIS — I42.8 NICM (NONISCHEMIC CARDIOMYOPATHY): ICD-10-CM

## 2022-03-10 DIAGNOSIS — Z01.818 PRE-OP TESTING: ICD-10-CM

## 2022-03-10 DIAGNOSIS — I10 ESSENTIAL HYPERTENSION: ICD-10-CM

## 2022-03-10 DIAGNOSIS — I50.43 ACUTE ON CHRONIC COMBINED SYSTOLIC (CONGESTIVE) AND DIASTOLIC (CONGESTIVE) HEART FAILURE: Primary | ICD-10-CM

## 2022-03-10 DIAGNOSIS — I60.9 SUBARACHNOID HEMORRHAGE: ICD-10-CM

## 2022-03-10 PROCEDURE — 99999 PR PBB SHADOW E&M-EST. PATIENT-LVL III: CPT | Mod: PBBFAC,,, | Performed by: INTERNAL MEDICINE

## 2022-03-10 PROCEDURE — 93750 INTERROGATION VAD IN PERSON: CPT | Mod: S$GLB,,, | Performed by: INTERNAL MEDICINE

## 2022-03-10 PROCEDURE — 1157F PR ADVANCE CARE PLAN OR EQUIV PRESENT IN MEDICAL RECORD: ICD-10-PCS | Mod: CPTII,S$GLB,, | Performed by: INTERNAL MEDICINE

## 2022-03-10 PROCEDURE — 3051F HG A1C>EQUAL 7.0%<8.0%: CPT | Mod: CPTII,S$GLB,, | Performed by: INTERNAL MEDICINE

## 2022-03-10 PROCEDURE — 94618 PULMONARY STRESS TESTING: ICD-10-PCS | Mod: S$GLB,,, | Performed by: INTERNAL MEDICINE

## 2022-03-10 PROCEDURE — 3008F PR BODY MASS INDEX (BMI) DOCUMENTED: ICD-10-PCS | Mod: CPTII,S$GLB,, | Performed by: INTERNAL MEDICINE

## 2022-03-10 PROCEDURE — 1101F PR PT FALLS ASSESS DOC 0-1 FALLS W/OUT INJ PAST YR: ICD-10-PCS | Mod: CPTII,S$GLB,, | Performed by: INTERNAL MEDICINE

## 2022-03-10 PROCEDURE — 3008F BODY MASS INDEX DOCD: CPT | Mod: CPTII,S$GLB,, | Performed by: INTERNAL MEDICINE

## 2022-03-10 PROCEDURE — 93750 OP LVAD INTERROGATION: ICD-10-PCS | Mod: S$GLB,,, | Performed by: INTERNAL MEDICINE

## 2022-03-10 PROCEDURE — 1126F PR PAIN SEVERITY QUANTIFIED, NO PAIN PRESENT: ICD-10-PCS | Mod: CPTII,S$GLB,, | Performed by: INTERNAL MEDICINE

## 2022-03-10 PROCEDURE — 1101F PT FALLS ASSESS-DOCD LE1/YR: CPT | Mod: CPTII,S$GLB,, | Performed by: INTERNAL MEDICINE

## 2022-03-10 PROCEDURE — 94618 PULMONARY STRESS TESTING: CPT | Mod: S$GLB,,, | Performed by: INTERNAL MEDICINE

## 2022-03-10 PROCEDURE — 1111F DSCHRG MED/CURRENT MED MERGE: CPT | Mod: CPTII,S$GLB,, | Performed by: INTERNAL MEDICINE

## 2022-03-10 PROCEDURE — 3051F PR MOST RECENT HEMOGLOBIN A1C LEVEL 7.0 - < 8.0%: ICD-10-PCS | Mod: CPTII,S$GLB,, | Performed by: INTERNAL MEDICINE

## 2022-03-10 PROCEDURE — 1126F AMNT PAIN NOTED NONE PRSNT: CPT | Mod: CPTII,S$GLB,, | Performed by: INTERNAL MEDICINE

## 2022-03-10 PROCEDURE — 99214 PR OFFICE/OUTPT VISIT, EST, LEVL IV, 30-39 MIN: ICD-10-PCS | Mod: 25,S$GLB,, | Performed by: INTERNAL MEDICINE

## 2022-03-10 PROCEDURE — 1157F ADVNC CARE PLAN IN RCRD: CPT | Mod: CPTII,S$GLB,, | Performed by: INTERNAL MEDICINE

## 2022-03-10 PROCEDURE — 3288F FALL RISK ASSESSMENT DOCD: CPT | Mod: CPTII,S$GLB,, | Performed by: INTERNAL MEDICINE

## 2022-03-10 PROCEDURE — 93793 PR ANTICOAGULANT MGMT FOR PT TAKING WARFARIN: ICD-10-PCS | Mod: S$GLB,,,

## 2022-03-10 PROCEDURE — 1111F PR DISCHARGE MEDS RECONCILED W/ CURRENT OUTPATIENT MED LIST: ICD-10-PCS | Mod: CPTII,S$GLB,, | Performed by: INTERNAL MEDICINE

## 2022-03-10 PROCEDURE — 3288F PR FALLS RISK ASSESSMENT DOCUMENTED: ICD-10-PCS | Mod: CPTII,S$GLB,, | Performed by: INTERNAL MEDICINE

## 2022-03-10 PROCEDURE — 99999 PR PBB SHADOW E&M-EST. PATIENT-LVL III: ICD-10-PCS | Mod: PBBFAC,,, | Performed by: INTERNAL MEDICINE

## 2022-03-10 PROCEDURE — 93793 ANTICOAG MGMT PT WARFARIN: CPT | Mod: S$GLB,,,

## 2022-03-10 PROCEDURE — 99214 OFFICE O/P EST MOD 30 MIN: CPT | Mod: 25,S$GLB,, | Performed by: INTERNAL MEDICINE

## 2022-03-10 RX ORDER — SPIRONOLACTONE 25 MG/1
25 TABLET ORAL DAILY
Qty: 30 TABLET | Refills: 11 | Status: SHIPPED | OUTPATIENT
Start: 2022-03-10 | End: 2023-03-16 | Stop reason: SDUPTHER

## 2022-03-10 NOTE — PROGRESS NOTES
"Date of Implant with Heartmate 3 LVAD: 1/13/2021    PATIENT ARRIVED IN CLINIC:  Ambulatory   Accompanied by: spouse    Vitals  Temperature, oral:   Temp Readings from Last 1 Encounters:   03/10/22 97.7 °F (36.5 °C) (Oral)     Blood Pressure:   BP Readings from Last 3 Encounters:   03/10/22 (!) 75/0   02/17/22 (!) 113/57   01/27/22 105/76        VAD Interrogation:  TXP JACEY INTERROGATIONS 3/10/2022 2/17/2022 2/16/2022   Type HeartMate3 - -   Flow 4.0 - -   Speed 5000 - -   PI 5.3 - -   Power (Edwards) 3.4 - -   LSL 4600 - -   Pulsatility No Pulse No Pulse No Pulse       History Log: [id number]  Problems / Issues / Alarms with VAD if any: no external power alarm, patient states he lost power at his home while connected to MPU  VAD Sounds: HM3 Smooth  Heartmate 3 Module Cable:  No yellow exposed and Attempted to unscrew modular cable to ensure it will be able to come lose in the event we ever need to change the modular cable while patient held the driveline in place so it would not move. Modular cable connection able to be unscrewed and re-tightened. Instructed pt to perform this weekly.    HCT:   Lab Results   Component Value Date    HCT 31.3 (L) 03/10/2022    HCT 25 (L) 01/15/2021       Complaints/reason for visit today: routine    VAD Binder With Patient: no   Reviewed VAD Numbers In Binder: yes  Enrolled in Care Companion: yes    Equipment:  Emergency Equipment With Patient: yes   Any Equipment Issues: None noted   It is medically necessary to ensure patient has properly functioning equipment and wearables to prevent infection, injury or death to patient.     DLES Assessment:  Appearance Of Driveline: "1"  Antibiotics: NO  Velour: no  Manual & Visual Inspection Of Driveline: No kinks or tears noted  Stabilization Device In Use: yes, [place] securement device      Patient MyChart Questionnaire: No flowsheet data found.     Assessment:   PAIN: NO  Complaints Of Nausea / Vomiting: None noted    Appearance and Frequency " Of Stools: normal and formed without blood & daily  Color Of Urine: clear/yellow  Coping/Depression/Anxiety: coping okay  Sleep Habits: 4-5 hrs /night  Sleep Aids: None noted  Showering: Yes, reminded to change dressing immediately after drying off  Activity/Exercise: walking/ physical therapy   Driving: No.    DLES Dressing Care:   Frequency of Dressing Changes: every other day & daily kit  Pt In Need Of Management Kits?:no   It is medically necessary to have VAD management kits in order to prevent infection or to assist in the healing of an infected DLES.    Labs:    Chemistry        Component Value Date/Time     03/10/2022 0816    K 3.4 (L) 03/10/2022 0816     03/10/2022 0816    CO2 29 03/10/2022 0816    BUN 14 03/10/2022 0816    CREATININE 1.2 03/10/2022 0816     (H) 03/10/2022 0816        Component Value Date/Time    CALCIUM 8.8 03/10/2022 0816    ALKPHOS 183 (H) 03/10/2022 0816    AST 25 03/10/2022 0816    ALT 21 03/10/2022 0816    BILITOT 2.2 (H) 03/10/2022 0816    ESTGFRAFRICA >60.0 03/10/2022 0816    EGFRNONAA >60.0 03/10/2022 0816            Magnesium   Date Value Ref Range Status   03/10/2022 1.7 1.6 - 2.6 mg/dL Final       Lab Results   Component Value Date    WBC 7.97 03/10/2022    HGB 9.3 (L) 03/10/2022    HCT 31.3 (L) 03/10/2022    MCV 95 03/10/2022     03/10/2022       Lab Results   Component Value Date    INR 2.7 (H) 03/10/2022    INR 3.1 03/07/2022    INR 2.0 03/03/2022       BNP   Date Value Ref Range Status   03/10/2022 727 (H) 0 - 99 pg/mL Final     Comment:     Values of less than 100 pg/ml are consistent with non-CHF populations.   02/16/2022 309 (H) 0 - 99 pg/mL Final     Comment:     Values of less than 100 pg/ml are consistent with non-CHF populations.   02/14/2022 284 (H) 0 - 99 pg/mL Final     Comment:     Values of less than 100 pg/ml are consistent with non-CHF populations.       LD   Date Value Ref Range Status   03/10/2022 258 110 - 260 U/L Final     Comment:      Results are increased in hemolyzed samples.   02/17/2022 191 110 - 260 U/L Final     Comment:     Results are increased in hemolyzed samples.   02/16/2022 255 110 - 260 U/L Final     Comment:     Results are increased in hemolyzed samples.       Labs reviewed with patient: YES      Patient Satisfaction Survey completed per patient: No  (explained about signature and box to check)  Medication reconciliation: per MA.  Medication Detail updated today: patient did not bring the card  Coumadin Managed by: [facility]    Education: Reviewed driveline care, emergency procedures, how to change the controller, alarms with patient, as well as discussed how to page the VAD coordinator in case of an emergency.   Covid - 19 education: Reminded patient/caregiver to check temperature daily and call us if it is > 99.0.  Reminded them  to stay 6 feet away from other people, wash hands frequently, don't touch your face and stay home except to get labs, medications, and appts.    Covid Vaccine: Pt informed that we are encouraging all VAD patients to receive the COVID vaccine.  Informed pt that they can take tylenol but should avoid other NSAIDs.      Plans/Needs: routine f/u w/ Dr Lake. Patient has been doing OP PT. Appears volume overloaded, plan for RHC and echo in 1 month. K also noted to be low, record shows lasix 80mg BID. Patient to call once home to confirm dose. May start aldactone if taking lower lasix dose. CMP, BNP in 1 week.    RTC 1 month w/ echo, RHC and FLP     Hurricane Season: No

## 2022-03-10 NOTE — PROCEDURES
Rocco France is a 66 y.o.  male patient, who presents for a 6 minute walk test ordered by MD Chloe.  The diagnosis is  (LVAD); Cardiomyopathy.  The patient's BMI is 24 kg/m2.  Predicted distance (lower limit of normal) is 394.32 meters.      Test Results:    The test was completed without stopping.  The total time walked was 360 seconds.  During walking, the patient reported:  Lightheadedness.  The patient used no assistive devices during testing.     03/10/2022---------Distance: 259.08 meters (850 feet)     O2 Sat % Supplemental Oxygen Heart Rate Blood Pressure Heath Scale   Pre-exercise  (Resting) 100 % Room Air 99 bpm Unable to obtain 0   During Exercise 99 % Room Air 72 bpm Unable to obtain 2   Post-exercise  (Recovery) 100 % Room Air  68 bpm       Recovery Time: 69 seconds  The patient walked the first 15 seconds in 6.00 seconds.    Performing nurse/tech: Yoav CALABRESE      PREVIOUS STUDY:   04/08/2021---------Distance: 304.8 meters (1000 feet)       O2 Sat % Supplemental Oxygen Heart Rate Blood Pressure Heath Scale   Pre-exercise  (Resting) 100 % Room Air 93 bpm 122/85 mmHg 0   During Exercise 100 % Room Air 112 bpm 123/87 mmHg 0   Post-exercise  (Recovery) 100 % Room Air  100 bpm          Recovery Time: 48 seconds               The patient walked the first 15 seconds in 4.90 seconds.    CLINICAL INTERPRETATION:  Six minute walk distance is 259.08 meters (850 feet (15ft in 6.0sec)) with light dyspnea.  During exercise, there was no significant desaturation while breathing room air.  Heart rate decreased significantly with walking.  This may represent an abnormal cardiovascular response to exercise.  The patient reported non-pulmonary symptoms during exercise.  Significant exercise impairment is likely due to cardiovascular causes and subjective symptoms.  The patient did complete the study, walking 360 seconds of the 360 second test.  Since the previous study in April 2021, exercise capacity is  significantly worse.  Based upon age and body mass index, exercise capacity is less than predicted.

## 2022-03-10 NOTE — LETTER
March 16, 2022        Teresa Mendoza  [address]  [address]  [place] [address]  Phone: [phone]  Fax: [phone]             [id number]  [address]  [place] [address]  Phone: [phone]   Patient: Rocco France   MR Number: [id number]   YOB: 1955   Date of Visit: 3/10/2022       Dear Dr. Teresa Mendoza    Thank you for referring Rocco France to me for evaluation. Attached you will find relevant portions of my assessment and plan of care.    If you have questions, please do not hesitate to call me. I look forward to following Rocco France along with you.    Sincerely,    Deana Lake MD    Enclosure    If you would like to receive this communication electronically, please contact [email or web address] or [phone] to request [employer] Link access.    [employer] Link is a tool which provides read-only access to select patient information with whom you have a relationship. Its easy to use and provides real time access to review your patients record including encounter summaries, notes, results, and demographic information.    If you feel you have received this communication in error or would no longer like to receive these types of communications, please e-mail [email or web address]

## 2022-03-11 NOTE — PROGRESS NOTES
After discussing equipment with patient, patient needed a new shower bag.   Lot#[id number] given to patient today in clinic and patient educated on proper use of item and maintenance. This shower bag is medically necessary for proper care and maintenance of LVAD system. MCS connect updated and VAD snapshot  updated today.It is medically necessary to ensure patient has properly functioning equipment and wearables to prevent infection, injury or death to patient.

## 2022-03-14 ENCOUNTER — TELEPHONE (OUTPATIENT)
Dept: TRANSPLANT | Facility: CLINIC | Age: 67
End: 2022-03-14
Payer: MEDICARE

## 2022-03-14 ENCOUNTER — ANTI-COAG VISIT (OUTPATIENT)
Dept: CARDIOLOGY | Facility: CLINIC | Age: 67
End: 2022-03-14
Payer: MEDICARE

## 2022-03-14 DIAGNOSIS — Z95.811 LVAD (LEFT VENTRICULAR ASSIST DEVICE) PRESENT: Primary | ICD-10-CM

## 2022-03-14 LAB — INR PPP: 2.2

## 2022-03-14 PROCEDURE — 93793 PR ANTICOAGULANT MGMT FOR PT TAKING WARFARIN: ICD-10-PCS | Mod: S$GLB,,,

## 2022-03-14 PROCEDURE — 93793 ANTICOAG MGMT PT WARFARIN: CPT | Mod: S$GLB,,,

## 2022-03-14 NOTE — TELEPHONE ENCOUNTER
Faxed over outpatient lab orders to     [facility] (ph: [phone], f: [phone]); [facility] (ph [phone], fax [phone])    To obtain CMP/BNP due to starting aldactone , decrease in K +

## 2022-03-16 PROBLEM — I50.43 ACUTE ON CHRONIC COMBINED SYSTOLIC AND DIASTOLIC HEART FAILURE: Status: ACTIVE | Noted: 2021-01-09

## 2022-03-17 ENCOUNTER — ANTI-COAG VISIT (OUTPATIENT)
Dept: CARDIOLOGY | Facility: CLINIC | Age: 67
End: 2022-03-17
Payer: MEDICARE

## 2022-03-17 ENCOUNTER — TELEPHONE (OUTPATIENT)
Dept: TRANSPLANT | Facility: CLINIC | Age: 67
End: 2022-03-17
Payer: MEDICARE

## 2022-03-17 DIAGNOSIS — Z95.811 LVAD (LEFT VENTRICULAR ASSIST DEVICE) PRESENT: Primary | ICD-10-CM

## 2022-03-17 LAB
EXT ALBUMIN: 2.7
EXT ALT: 23
EXT AST: 25
EXT BILIRUBIN TOTAL: 2.7
EXT BUN: 16
EXT CALCIUM: 8.6
EXT CHLORIDE: 100
EXT CREATININE: 1.2 MG/DL
EXT GLUCOSE: 118
EXT NT PROBNP: 5096
EXT POTASSIUM: 3
EXT PROTEIN TOTAL: 8.8
EXT SODIUM: 138 MMOL/L
INR PPP: 2

## 2022-03-17 PROCEDURE — 93793 ANTICOAG MGMT PT WARFARIN: CPT | Mod: S$GLB,,,

## 2022-03-17 PROCEDURE — 93793 PR ANTICOAGULANT MGMT FOR PT TAKING WARFARIN: ICD-10-PCS | Mod: S$GLB,,,

## 2022-03-17 NOTE — PROCEDURES
TXP JACEY INTERROGATIONS 3/10/2022 2/17/2022 2/16/2022 2/16/2022 2/16/2022 2/15/2022 2/15/2022   Type HeartMate3 - - - - - -   Flow 4.0 - - - - - -   Speed 5000 - - - - - -   PI 5.3 - - - - - -   Power (Edwards) 3.4 - - - - - -   LSL 4600 - - - - - -   Pulsatility No Pulse No Pulse No Pulse No Pulse No Pulse Intermittent pulse Intermittent pulse   }

## 2022-03-17 NOTE — PROGRESS NOTES
Subjective:   Patient ID:  Rocco France is a 66 y.o. male who presents for LVAD followup visit.    Implant Date: 1/13/2021  Initials: [facility]     Heartmate 3 RPM 5000     INR goal: 2-3   Bridge with Heparin   Antiplatelets: ASA 81     TXP JACEY INTERROGATIONS 3/10/2022   Type HeartMate3   Flow 4.0   Speed 5000   PI 5.3   Power (Edwards) 3.4   LSL 4600   Pulsatility No Pulse   Interrogation of Ventricular assist device was performed with physician analysis of device parameters and review of device function. I have personally reviewed the interrogation findings and agree with findings as stated.     HPI  67 yo BM with stage D CHF due to NICMP s/p HM3, s/p ICD, HTN, HLD, T2DM returns for routine LVAD visit.  pateint had a protracted admission course 01/27 to 02/17/22 with covid and syncope with subdural and subarachnoid  Hemorrhage. Patient had been intubated on transfer, was extubated soon after. Extubated without problems. NS/NCC consulted and followed. Did well overall had INR goal reduced. Patient discharged with good BP control.    Patient states he has been doing well since then, states he has been getting stronger and feeling better regularly. Has noticed increased fluid on board since discharge from the hospital thought.      Review of Systems   Constitutional: Negative for chills, decreased appetite, diaphoresis, fever, malaise/fatigue, weight gain and weight loss.   Eyes: Negative for visual disturbance.   Cardiovascular: Positive for dyspnea on exertion. Negative for chest pain, claudication, cyanosis, irregular heartbeat, leg swelling, near-syncope, orthopnea, palpitations, paroxysmal nocturnal dyspnea and syncope.   Respiratory: Positive for shortness of breath. Negative for cough, hemoptysis, sleep disturbances due to breathing, snoring, sputum production and wheezing.    Hematologic/Lymphatic: Negative for adenopathy and bleeding problem. Does not bruise/bleed easily.   Skin: Negative for color change, poor  "wound healing, rash, skin cancer and suspicious lesions.   Musculoskeletal: Negative for back pain, falls, gout, joint pain and muscle weakness.   Gastrointestinal: Negative for bloating, abdominal pain, anorexia, constipation, diarrhea, heartburn, hematemesis, hematochezia, hemorrhoids, melena, nausea and vomiting.   Genitourinary: Negative for nocturia and urgency.   Neurological: Negative for excessive daytime sleepiness, dizziness, focal weakness, headaches, light-headedness, paresthesias, tremors and weakness.   Psychiatric/Behavioral: Negative for depression and memory loss. The patient does not have insomnia and is not nervous/anxious.        Objective:   Blood pressure (!) 75/0, temperature 97.7 °F (36.5 °C), temperature source Oral, height 6' 2" (1.88 m), weight 84.8 kg (187 lb).body mass index is 24.01 kg/m².    Physical Exam  Vitals and nursing note reviewed.   Constitutional:       General: He is not in acute distress.     Appearance: He is well-developed. He is not ill-appearing, toxic-appearing or diaphoretic.   HENT:      Head: Normocephalic and atraumatic.   Eyes:      General: No scleral icterus.        Right eye: No discharge.         Left eye: No discharge.      Conjunctiva/sclera: Conjunctivae normal.   Neck:      Thyroid: No thyromegaly.      Vascular: No JVD.      Trachea: No tracheal deviation.   Cardiovascular:      Comments: Normal LVAD sounds; DL 1  jvp at 13cm H2O  Pulmonary:      Effort: Pulmonary effort is normal. No respiratory distress.      Breath sounds: Normal breath sounds. No wheezing or rales.   Abdominal:      General: Bowel sounds are normal. There is no distension (liver span is normal).      Palpations: Abdomen is soft. There is no mass.      Tenderness: There is no abdominal tenderness. There is no guarding or rebound.   Musculoskeletal:         General: Swelling (0.5+) present. No tenderness.      Right lower leg: Edema (0.5+) present.      Left lower leg: Edema (0.5+) " present.   Skin:     General: Skin is warm and dry.   Neurological:      General: No focal deficit present.      Mental Status: He is alert. Mental status is at baseline.   Psychiatric:         Mood and Affect: Mood normal.         Behavior: Behavior normal.         Thought Content: Thought content normal.         Judgment: Judgment normal.         Lab Results   Component Value Date     (H) 03/10/2022     03/10/2022    K 3.4 (L) 03/10/2022    MG 1.7 03/10/2022     03/10/2022    CO2 29 03/10/2022    PHOS 2.6 (L) 03/10/2022    BUN 14 03/10/2022    CREATININE 1.2 03/10/2022     (H) 03/10/2022    HGBA1C 7.7 (H) 01/28/2022    AST 25 03/10/2022    ALT 21 03/10/2022    ALBUMIN 2.8 (L) 03/10/2022    PROT 8.1 03/10/2022    BILITOT 2.2 (H) 03/10/2022    WBC 7.97 03/10/2022    HGB 9.3 (L) 03/10/2022    HCT 31.3 (L) 03/10/2022    HCT 25 (L) 01/15/2021     03/10/2022    INR 2.2 03/14/2022     03/10/2022    TSH 1.420 11/16/2020    CHOL 134 09/22/2021    HDL 49 09/22/2021    LDLCALC 72.8 09/22/2021    TRIG 61 09/22/2021     Last ECHO 9/22/21 Summary  · There is an LVAD present. Base speed is 5000 RPMs. The pump type is a Heartmate III. The interventricular septum appears midline. The aortic valve opens intermittently.  · The left ventricle is moderately enlarged with severely decreased systolic function. The estimated ejection fraction is 25%.  · There is left ventricular global hypokinesis.  · Moderate right ventricular enlargement with mildly reduced right ventricular systolic function.  · Grade III left ventricular diastolic dysfunction.  · Mild biatrial enlargement.  · Mild mitral regurgitation.  · The estimated PA systolic pressure is 36 mmHg.  · Intermediate central venous pressure (8 mmHg).  · The sinuses of Valsalva is mildly dilated.  · The ascending aorta is mildly dilated.      Assessment:      1. Acute on chronic combined systolic (congestive) and diastolic (congestive) heart  failure    2. Heart replaced by heart assist device    3. Pre-op testing    4. Subarachnoid bleed    5. Subarachnoid hemorrhage    6. NICM (nonischemic cardiomyopathy)    7. Essential hypertension    8. LVAD (left ventricular assist device) present        Plan:   Today on exam has more volume on board than previously seen. Concerned given current issues and going back does not seem to have had RHC in a couple of years.  Will get RHC to evaluate filling pressures and cardiac flows.   Will start aldactone 25mg po daily. Repeat labs 1 week.   Patient is now NYHA II - III.     Recommend 2 gram sodium restriction and 1500cc fluid restriction.  Encourage physical activity with graded exercise program.  Requested patient to weigh themselves daily, and to notify us if their weight increases by more than 3 lbs in 1 day or 5 lbs in 1 week.     Listed for transplant: No    UNOS Patient Status  Functional Status: 60% - Requires occasional assistance but is able to care for needs  Physical Capacity: No Limitations  Working for Income: No  If no, reason not working: Patient Choice - Retired

## 2022-03-17 NOTE — TELEPHONE ENCOUNTER
----- Message from Cira Izquierdo LPN sent at 3/14/2022  7:26 AM CDT -----  Regarding: cmp,bnp started aldactone decrease K +  [facility] (ph: [phone], f: [phone]); [facility] (ph [phone], fax [phone])

## 2022-03-18 ENCOUNTER — TELEPHONE (OUTPATIENT)
Dept: TRANSPLANT | Facility: CLINIC | Age: 67
End: 2022-03-18
Payer: MEDICARE

## 2022-03-18 NOTE — PROGRESS NOTES
Dr. Lake,    We started pt on aldactone last week.  His potassium was 3.4, today is 3.0.  I can't get in touch with him to confirm he started the medication, but if he did start it, do you want him to start potassium?  If so, will you please send an order to his pharmacy and I'll let him know when I get in touch with him.     Thank you,  Zaida

## 2022-03-18 NOTE — LETTER
[id number]  [address]  [facility] [address]  Phone: [phone]       Date Sent:  03/21/2022      Rocco France  1955      This patient will require the following lab(s). The patient will be instructed to report to your facility for their lab to be drawn.  Please fax all results to [phone]. INR results to be faxed to [phone]    LABS ORDERED    ICD 10     DATE REQUESTED:  Basic Metabolic Panel                       ICD Z95.811                                        3/21/22          Zaida Renner RN, CCRN     [phone]   BRODIE ThomasN, RN--[phone]   BRODIE ChildersN, RN--[phone]   Kacey Barr -[phone]       Fax all results to [phone].  Fax INR results to [phone].  Sincerely,      Deana Lake M.D.  Medical Director, Mechanical Circulatory Device Support Program  Section of Cardiomyopathy & Heart Transplantation

## 2022-03-21 ENCOUNTER — ANTI-COAG VISIT (OUTPATIENT)
Dept: CARDIOLOGY | Facility: CLINIC | Age: 67
End: 2022-03-21
Payer: MEDICARE

## 2022-03-21 DIAGNOSIS — Z95.811 LVAD (LEFT VENTRICULAR ASSIST DEVICE) PRESENT: Primary | ICD-10-CM

## 2022-03-21 LAB
EXT BUN: 16
EXT CALCIUM: 8.7
EXT CHLORIDE: 100
EXT CREATININE: 1.4 MG/DL
EXT GLUCOSE: 155
EXT POTASSIUM: 3
EXT SODIUM: 139 MMOL/L
INR PPP: 2.7

## 2022-03-21 PROCEDURE — 93793 PR ANTICOAGULANT MGMT FOR PT TAKING WARFARIN: ICD-10-PCS | Mod: S$GLB,,,

## 2022-03-21 PROCEDURE — 93793 ANTICOAG MGMT PT WARFARIN: CPT | Mod: S$GLB,,,

## 2022-03-21 NOTE — TELEPHONE ENCOUNTER
3/18/22:  Checked labs post 1 week medication change.  Potassium 3.0.  Called pt, wife to confirm he started the aldactone as ordered.  Unable to get in touch with them.  Message sent to Dr. Lake in the event he did start the medication and his potassium is 3.0, would she like to make any changes.      3/21/22 11:40 Called and spoke with wife.  She verbalized he did start the aldactone a week ago.  He is at a therapy appt right now so I asked her to ask him to eat oranges and bananas.  She said he has been doing that.  He is getting INR checked after this cookie.  Request sent to [facility] for a BMP today well to check potassium level.

## 2022-03-21 NOTE — PROGRESS NOTES
Patient called and was given lab result, verified coumadin: 5mg daily since Thursday, not much appetite since 3 days ago, reports some dizziness/blurry vision--has not informed LVAD department--advised to call LVAD or , scheduled for [facility] -3/28 or 3/29

## 2022-03-21 NOTE — PROGRESS NOTES
INR not at goal. Medications, chart, and patient findings reviewed. Took higher dose than advised. See calendar for adjustments to dose and follow up plan.

## 2022-03-22 ENCOUNTER — HOSPITAL ENCOUNTER (INPATIENT)
Facility: HOSPITAL | Age: 67
LOS: 3 days | Discharge: HOME HEALTH | DRG: 286 | End: 2022-03-26
Attending: EMERGENCY MEDICINE | Admitting: INTERNAL MEDICINE
Payer: MEDICARE

## 2022-03-22 ENCOUNTER — TELEPHONE (OUTPATIENT)
Dept: TRANSPLANT | Facility: CLINIC | Age: 67
End: 2022-03-22
Payer: MEDICARE

## 2022-03-22 DIAGNOSIS — I50.9 CHF (CONGESTIVE HEART FAILURE): ICD-10-CM

## 2022-03-22 DIAGNOSIS — R00.0 TACHYCARDIA: ICD-10-CM

## 2022-03-22 DIAGNOSIS — R42 DIZZINESS: ICD-10-CM

## 2022-03-22 DIAGNOSIS — H53.2 DIPLOPIA: Primary | ICD-10-CM

## 2022-03-22 DIAGNOSIS — I62.00 SUBDURAL BLEEDING: ICD-10-CM

## 2022-03-22 DIAGNOSIS — Z95.811 LVAD (LEFT VENTRICULAR ASSIST DEVICE) PRESENT: ICD-10-CM

## 2022-03-22 DIAGNOSIS — I47.10 PSVT (PAROXYSMAL SUPRAVENTRICULAR TACHYCARDIA): ICD-10-CM

## 2022-03-22 LAB
ALBUMIN SERPL BCP-MCNC: 2.5 G/DL (ref 3.5–5.2)
ALP SERPL-CCNC: 173 U/L (ref 55–135)
ALT SERPL W/O P-5'-P-CCNC: 24 U/L (ref 10–44)
ANION GAP SERPL CALC-SCNC: 8 MMOL/L (ref 8–16)
AST SERPL-CCNC: 36 U/L (ref 10–40)
BASOPHILS # BLD AUTO: 0.02 K/UL (ref 0–0.2)
BASOPHILS NFR BLD: 0.2 % (ref 0–1.9)
BILIRUB SERPL-MCNC: 2.7 MG/DL (ref 0.1–1)
BNP SERPL-MCNC: 616 PG/ML (ref 0–99)
BUN SERPL-MCNC: 18 MG/DL (ref 8–23)
BUN SERPL-MCNC: 19 MG/DL (ref 6–30)
CALCIUM SERPL-MCNC: 8.7 MG/DL (ref 8.7–10.5)
CHLORIDE SERPL-SCNC: 100 MMOL/L (ref 95–110)
CHLORIDE SERPL-SCNC: 103 MMOL/L (ref 95–110)
CO2 SERPL-SCNC: 29 MMOL/L (ref 23–29)
CREAT SERPL-MCNC: 1.4 MG/DL (ref 0.5–1.4)
CREAT SERPL-MCNC: 1.5 MG/DL (ref 0.5–1.4)
DIFFERENTIAL METHOD: ABNORMAL
EOSINOPHIL # BLD AUTO: 0.1 K/UL (ref 0–0.5)
EOSINOPHIL NFR BLD: 0.8 % (ref 0–8)
ERYTHROCYTE [DISTWIDTH] IN BLOOD BY AUTOMATED COUNT: 15.5 % (ref 11.5–14.5)
EST. GFR  (AFRICAN AMERICAN): >60 ML/MIN/1.73 M^2
EST. GFR  (NON AFRICAN AMERICAN): 52 ML/MIN/1.73 M^2
GLUCOSE SERPL-MCNC: 123 MG/DL (ref 70–110)
GLUCOSE SERPL-MCNC: 128 MG/DL (ref 70–110)
HCT VFR BLD AUTO: 29.8 % (ref 40–54)
HCT VFR BLD CALC: 30 %PCV (ref 36–54)
HGB BLD-MCNC: 9 G/DL (ref 14–18)
IMM GRANULOCYTES # BLD AUTO: 0.03 K/UL (ref 0–0.04)
IMM GRANULOCYTES NFR BLD AUTO: 0.4 % (ref 0–0.5)
INR PPP: 2.2 (ref 0.8–1.2)
LDH SERPL L TO P-CCNC: 315 U/L (ref 110–260)
LYMPHOCYTES # BLD AUTO: 1.6 K/UL (ref 1–4.8)
LYMPHOCYTES NFR BLD: 19.5 % (ref 18–48)
MAGNESIUM SERPL-MCNC: 2.1 MG/DL (ref 1.6–2.6)
MAGNESIUM SERPL-MCNC: 2.2 MG/DL (ref 1.6–2.6)
MCH RBC QN AUTO: 26.5 PG (ref 27–31)
MCHC RBC AUTO-ENTMCNC: 30.2 G/DL (ref 32–36)
MCV RBC AUTO: 88 FL (ref 82–98)
MONOCYTES # BLD AUTO: 1.1 K/UL (ref 0.3–1)
MONOCYTES NFR BLD: 13.3 % (ref 4–15)
NEUTROPHILS # BLD AUTO: 5.5 K/UL (ref 1.8–7.7)
NEUTROPHILS NFR BLD: 65.8 % (ref 38–73)
NRBC BLD-RTO: 0 /100 WBC
PLATELET # BLD AUTO: 187 K/UL (ref 150–450)
PMV BLD AUTO: 11.1 FL (ref 9.2–12.9)
POC IONIZED CALCIUM: 1 MMOL/L (ref 1.06–1.42)
POC TCO2 (MEASURED): 26 MMOL/L (ref 23–29)
POCT GLUCOSE: 131 MG/DL (ref 70–110)
POTASSIUM BLD-SCNC: 3 MMOL/L (ref 3.5–5.1)
POTASSIUM SERPL-SCNC: 3 MMOL/L (ref 3.5–5.1)
POTASSIUM SERPL-SCNC: 3.7 MMOL/L (ref 3.5–5.1)
POTASSIUM SERPL-SCNC: 4.9 MMOL/L (ref 3.5–5.1)
PROT SERPL-MCNC: 8 G/DL (ref 6–8.4)
PROTHROMBIN TIME: 22.1 SEC (ref 9–12.5)
RBC # BLD AUTO: 3.39 M/UL (ref 4.6–6.2)
SAMPLE: ABNORMAL
SODIUM BLD-SCNC: 142 MMOL/L (ref 136–145)
SODIUM SERPL-SCNC: 140 MMOL/L (ref 136–145)
TROPONIN I SERPL DL<=0.01 NG/ML-MCNC: 0.03 NG/ML (ref 0–0.03)
WBC # BLD AUTO: 8.39 K/UL (ref 3.9–12.7)

## 2022-03-22 PROCEDURE — 25000003 PHARM REV CODE 250: Performed by: INTERNAL MEDICINE

## 2022-03-22 PROCEDURE — G0378 HOSPITAL OBSERVATION PER HR: HCPCS

## 2022-03-22 PROCEDURE — 84484 ASSAY OF TROPONIN QUANT: CPT | Performed by: EMERGENCY MEDICINE

## 2022-03-22 PROCEDURE — 93010 ELECTROCARDIOGRAM REPORT: CPT | Mod: ,,, | Performed by: INTERNAL MEDICINE

## 2022-03-22 PROCEDURE — 85025 COMPLETE CBC W/AUTO DIFF WBC: CPT | Performed by: EMERGENCY MEDICINE

## 2022-03-22 PROCEDURE — 93005 ELECTROCARDIOGRAM TRACING: CPT

## 2022-03-22 PROCEDURE — 63600175 PHARM REV CODE 636 W HCPCS: Performed by: INTERNAL MEDICINE

## 2022-03-22 PROCEDURE — 83735 ASSAY OF MAGNESIUM: CPT | Performed by: EMERGENCY MEDICINE

## 2022-03-22 PROCEDURE — 36415 COLL VENOUS BLD VENIPUNCTURE: CPT | Performed by: STUDENT IN AN ORGANIZED HEALTH CARE EDUCATION/TRAINING PROGRAM

## 2022-03-22 PROCEDURE — 80053 COMPREHEN METABOLIC PANEL: CPT | Performed by: EMERGENCY MEDICINE

## 2022-03-22 PROCEDURE — 25500020 PHARM REV CODE 255: Performed by: EMERGENCY MEDICINE

## 2022-03-22 PROCEDURE — 85610 PROTHROMBIN TIME: CPT | Performed by: EMERGENCY MEDICINE

## 2022-03-22 PROCEDURE — 93750 INTERROGATION VAD IN PERSON: CPT | Mod: ,,, | Performed by: INTERNAL MEDICINE

## 2022-03-22 PROCEDURE — 99285 EMERGENCY DEPT VISIT HI MDM: CPT | Mod: 25

## 2022-03-22 PROCEDURE — 99285 PR EMERGENCY DEPT VISIT,LEVEL V: ICD-10-PCS | Mod: ,,, | Performed by: EMERGENCY MEDICINE

## 2022-03-22 PROCEDURE — 84132 ASSAY OF SERUM POTASSIUM: CPT | Performed by: STUDENT IN AN ORGANIZED HEALTH CARE EDUCATION/TRAINING PROGRAM

## 2022-03-22 PROCEDURE — 83615 LACTATE (LD) (LDH) ENZYME: CPT | Performed by: EMERGENCY MEDICINE

## 2022-03-22 PROCEDURE — 93010 EKG 12-LEAD: ICD-10-PCS | Mod: ,,, | Performed by: INTERNAL MEDICINE

## 2022-03-22 PROCEDURE — 93750 PR INTERROGATE VENT ASSIST DEV, IN PERSON, W PHYSICIAN ANALYSIS: ICD-10-PCS | Mod: ,,, | Performed by: INTERNAL MEDICINE

## 2022-03-22 PROCEDURE — 83735 ASSAY OF MAGNESIUM: CPT | Mod: 91 | Performed by: INTERNAL MEDICINE

## 2022-03-22 PROCEDURE — 25000003 PHARM REV CODE 250: Performed by: EMERGENCY MEDICINE

## 2022-03-22 PROCEDURE — 99285 EMERGENCY DEPT VISIT HI MDM: CPT | Mod: ,,, | Performed by: EMERGENCY MEDICINE

## 2022-03-22 PROCEDURE — 96365 THER/PROPH/DIAG IV INF INIT: CPT

## 2022-03-22 PROCEDURE — 63600175 PHARM REV CODE 636 W HCPCS: Performed by: STUDENT IN AN ORGANIZED HEALTH CARE EDUCATION/TRAINING PROGRAM

## 2022-03-22 PROCEDURE — 25000003 PHARM REV CODE 250: Performed by: STUDENT IN AN ORGANIZED HEALTH CARE EDUCATION/TRAINING PROGRAM

## 2022-03-22 PROCEDURE — 83880 ASSAY OF NATRIURETIC PEPTIDE: CPT | Performed by: EMERGENCY MEDICINE

## 2022-03-22 PROCEDURE — 96376 TX/PRO/DX INJ SAME DRUG ADON: CPT

## 2022-03-22 PROCEDURE — 36415 COLL VENOUS BLD VENIPUNCTURE: CPT | Performed by: INTERNAL MEDICINE

## 2022-03-22 PROCEDURE — 96375 TX/PRO/DX INJ NEW DRUG ADDON: CPT

## 2022-03-22 PROCEDURE — 84132 ASSAY OF SERUM POTASSIUM: CPT | Mod: 91 | Performed by: INTERNAL MEDICINE

## 2022-03-22 PROCEDURE — 86803 HEPATITIS C AB TEST: CPT | Performed by: EMERGENCY MEDICINE

## 2022-03-22 RX ORDER — ACETAMINOPHEN 325 MG/1
650 TABLET ORAL EVERY 6 HOURS PRN
Status: DISCONTINUED | OUTPATIENT
Start: 2022-03-22 | End: 2022-03-26 | Stop reason: HOSPADM

## 2022-03-22 RX ORDER — DOCUSATE SODIUM 100 MG/1
100 CAPSULE, LIQUID FILLED ORAL DAILY
Status: DISCONTINUED | OUTPATIENT
Start: 2022-03-22 | End: 2022-03-26 | Stop reason: HOSPADM

## 2022-03-22 RX ORDER — HYDRALAZINE HYDROCHLORIDE 20 MG/ML
10 INJECTION INTRAMUSCULAR; INTRAVENOUS ONCE
Status: COMPLETED | OUTPATIENT
Start: 2022-03-22 | End: 2022-03-22

## 2022-03-22 RX ORDER — GLUCAGON 1 MG
1 KIT INJECTION
Status: DISCONTINUED | OUTPATIENT
Start: 2022-03-22 | End: 2022-03-26 | Stop reason: HOSPADM

## 2022-03-22 RX ORDER — SPIRONOLACTONE 25 MG/1
25 TABLET ORAL DAILY
Status: DISCONTINUED | OUTPATIENT
Start: 2022-03-22 | End: 2022-03-26 | Stop reason: HOSPADM

## 2022-03-22 RX ORDER — FUROSEMIDE 10 MG/ML
80 INJECTION INTRAMUSCULAR; INTRAVENOUS EVERY 8 HOURS
Status: DISCONTINUED | OUTPATIENT
Start: 2022-03-22 | End: 2022-03-24

## 2022-03-22 RX ORDER — POLYETHYLENE GLYCOL 3350 17 G/17G
17 POWDER, FOR SOLUTION ORAL DAILY PRN
Status: DISCONTINUED | OUTPATIENT
Start: 2022-03-22 | End: 2022-03-26 | Stop reason: HOSPADM

## 2022-03-22 RX ORDER — ATORVASTATIN CALCIUM 20 MG/1
80 TABLET, FILM COATED ORAL DAILY
Status: DISCONTINUED | OUTPATIENT
Start: 2022-03-23 | End: 2022-03-26 | Stop reason: HOSPADM

## 2022-03-22 RX ORDER — LISINOPRIL 10 MG/1
10 TABLET ORAL DAILY
Status: DISCONTINUED | OUTPATIENT
Start: 2022-03-23 | End: 2022-03-26 | Stop reason: HOSPADM

## 2022-03-22 RX ORDER — IBUPROFEN 200 MG
16 TABLET ORAL
Status: DISCONTINUED | OUTPATIENT
Start: 2022-03-22 | End: 2022-03-26 | Stop reason: HOSPADM

## 2022-03-22 RX ORDER — POTASSIUM CHLORIDE 7.45 MG/ML
10 INJECTION INTRAVENOUS ONCE
Status: COMPLETED | OUTPATIENT
Start: 2022-03-22 | End: 2022-03-22

## 2022-03-22 RX ORDER — LISINOPRIL 20 MG/1
20 TABLET ORAL NIGHTLY
Status: DISCONTINUED | OUTPATIENT
Start: 2022-03-22 | End: 2022-03-26 | Stop reason: HOSPADM

## 2022-03-22 RX ORDER — POTASSIUM CHLORIDE 750 MG/1
30 CAPSULE, EXTENDED RELEASE ORAL ONCE
Status: COMPLETED | OUTPATIENT
Start: 2022-03-22 | End: 2022-03-22

## 2022-03-22 RX ORDER — DIGOXIN 0.25 MG/ML
500 INJECTION INTRAMUSCULAR; INTRAVENOUS ONCE
Status: COMPLETED | OUTPATIENT
Start: 2022-03-22 | End: 2022-03-22

## 2022-03-22 RX ORDER — IBUPROFEN 200 MG
24 TABLET ORAL
Status: DISCONTINUED | OUTPATIENT
Start: 2022-03-22 | End: 2022-03-26 | Stop reason: HOSPADM

## 2022-03-22 RX ORDER — LISINOPRIL 20 MG/1
20 TABLET ORAL NIGHTLY
Status: DISCONTINUED | OUTPATIENT
Start: 2022-03-22 | End: 2022-03-22

## 2022-03-22 RX ORDER — POTASSIUM CHLORIDE 20 MEQ/1
40 TABLET, EXTENDED RELEASE ORAL
Status: COMPLETED | OUTPATIENT
Start: 2022-03-22 | End: 2022-03-22

## 2022-03-22 RX ORDER — AMLODIPINE BESYLATE 5 MG/1
5 TABLET ORAL DAILY
Status: DISCONTINUED | OUTPATIENT
Start: 2022-03-22 | End: 2022-03-23

## 2022-03-22 RX ADMIN — HYDRALAZINE HYDROCHLORIDE 10 MG: 20 INJECTION, SOLUTION INTRAMUSCULAR; INTRAVENOUS at 05:03

## 2022-03-22 RX ADMIN — AMLODIPINE BESYLATE 5 MG: 5 TABLET ORAL at 10:03

## 2022-03-22 RX ADMIN — POTASSIUM CHLORIDE 10 MEQ: 7.45 INJECTION INTRAVENOUS at 08:03

## 2022-03-22 RX ADMIN — POTASSIUM CHLORIDE 40 MEQ: 1500 TABLET, EXTENDED RELEASE ORAL at 03:03

## 2022-03-22 RX ADMIN — POTASSIUM CHLORIDE 30 MEQ: 10 CAPSULE, COATED, EXTENDED RELEASE ORAL at 12:03

## 2022-03-22 RX ADMIN — LISINOPRIL 20 MG: 20 TABLET ORAL at 06:03

## 2022-03-22 RX ADMIN — POTASSIUM CHLORIDE 40 MEQ: 1500 TABLET, EXTENDED RELEASE ORAL at 04:03

## 2022-03-22 RX ADMIN — FUROSEMIDE 80 MG: 10 INJECTION, SOLUTION INTRAVENOUS at 03:03

## 2022-03-22 RX ADMIN — POTASSIUM CHLORIDE 40 MEQ: 1500 TABLET, EXTENDED RELEASE ORAL at 06:03

## 2022-03-22 RX ADMIN — FUROSEMIDE 80 MG: 10 INJECTION, SOLUTION INTRAVENOUS at 10:03

## 2022-03-22 RX ADMIN — SPIRONOLACTONE 25 MG: 25 TABLET, FILM COATED ORAL at 05:03

## 2022-03-22 RX ADMIN — IOHEXOL 100 ML: 350 INJECTION, SOLUTION INTRAVENOUS at 11:03

## 2022-03-22 RX ADMIN — HYDRALAZINE HYDROCHLORIDE 10 MG: 20 INJECTION, SOLUTION INTRAMUSCULAR; INTRAVENOUS at 10:03

## 2022-03-22 RX ADMIN — DIGOXIN 500 MCG: 0.25 INJECTION INTRAMUSCULAR; INTRAVENOUS at 07:03

## 2022-03-22 NOTE — HPI
Mr France is a 66M prior subdural hematoma with subarrachnoid hemorrhage (2022), CHF (nonischemic cardiomyopathy), s/p LVAD, s/p ICD, HTN, HLD, DM2. The [place] [facility] [place] with complaints of double vision, dizziness and lightheadedness with onset three days ago. The patient reports that his diplopia occurs both while standing and sitting but that it is more prominent while standing. He also complains of dizziness, as though the room is spinning, which occurs when standing up or trying to get around. He reports that his symptoms wax and wane and reports no exacerbating or alleviating factors. He denies any positional relationship to his symptoms. He additionally endorsed a HA this morning (2/10 intensity) without any associated photo or phonophobia. He denied any other focal neurologic symptoms.

## 2022-03-22 NOTE — ASSESSMENT & PLAN NOTE
Mr France is a 66M prior subdural hematoma with subarrachnoid hemorrhage (2022), CHF (nonischemic cardiomyopathy), s/p LVAD, s/p ICD, HTN, HLD, DM2. The patient presented to [facility] [place] on 3/22 with complaints of double vision, dizziness and lightheadedness with onset three days ago. The patient reports that his diplopia occurs both while standing and sitting but that it is more prominent while standing. He also complains of dizziness, as though the room is spinning, which occurs when standing up or trying to get around. He reports that his symptoms wax and wane and reports no exacerbating or alleviating factors. He denies any positional relationship to his symptoms. He additionally endorsed a HA this morning (2/10 intensity) without any associated photo or phonophobia. He denied any other focal neurologic symptoms.     The patients symptoms of diplopia and vertigo have been ongoing for the past 3 days. The patient believes it is more frequent and worst when first standing. He, however, still reports some diplopia while sitting - although vague. Given his clinical history, the patient would benefit from further workup of intracranial etiologies of his symptoms as well as further workup of a possible orthostatic etiology of his symptoms.     Recommend MRI brain with and without contrast  Recommend checking orthostatic vitals to r/o orthostatic hypotension  Will continue to follow   Please contact with any questions or concerns

## 2022-03-22 NOTE — ASSESSMENT & PLAN NOTE
Diplopia, diziness and disequilibrium x 2 days  No LOC, syncope, chest pain, dyspnea  Recent h/o hemorraghic stroke with INR goal 1.5-2.0 now  CT head w/o contrast unremarkable for an acute pathology in the ER  Will consult neurology

## 2022-03-22 NOTE — Clinical Note
The PA catheter was repositioned to the main pulmonary artery. Hemodynamics were performed. Cardiac output was obtained at 7 L/min. O2 saturation was measured at 67%.  CO = 7.45   CI = 3.56

## 2022-03-22 NOTE — PROGRESS NOTES
1751: lab at bedside to collect labs. HR sustaining back in the 140's. MD Mario notified. Ordered lisinopril 20mg PO to be given now.     1745: hydralazine 10mg IVP given per order.     1741: EKG completed. Patient is in afib RVR     1731:HR sustaining in the 140's. Right carotid bounding. MD Mario notified. Patient complaining of dizziness while sitting on the edge of bed. Patient helped back into bed.STAT EKG ordered. MD at bedside. Doppler 102. No BP.

## 2022-03-22 NOTE — HPI
"67 yo BM with stage D CHF due to NICMP s/p HM3, s/p ICD, HTN, HLD, T2DM, h/o subdural/subarachnoid hemorrhage presents to ED with 3 days of dizzness and double vision. Reports feeling "woozy" and lightheaded since Sunday morning. He has also noticed double vision. His symptoms have been stable since they started. Yesterday he went to get bloodwork for his INR and when he got the call to discuss results, he was advised to present to ED.   "

## 2022-03-22 NOTE — CONSULTS
"[facility] - Emergency Dept  Cardiology  Consult Note    Patient Name: Rocco France  MRN: [id number]  Admission Date: 3/22/2022  Hospital Length of Stay: 0 days  Code Status: Prior   Attending Provider: Radha Wilkins MD   Consulting Provider: Lavon Otto MD  Primary Care Physician: Jay Guardado DO  Principal Problem:<principal problem not specified>    Patient information was obtained from patient, past medical records and ER records.     Inpatient consult to Cardiology  Consult performed by: Lavon Otto MD  Consult ordered by: Radha Wilkins MD        Subjective:     Chief Complaint:  dizziness     HPI:   65 yo BM with stage D CHF due to NICMP s/p HM3, s/p ICD, HTN, HLD, T2DM, h/o subdural/subarachnoid hemorrhage presents to ED with 3 days of dizzness and double vision. Reports feeling "woozy" and lightheaded since Sunday morning. He has also noticed double vision. His symptoms have been stable since they started. Yesterday he went to get bloodwork for his INR and when he got the call to discuss results, he was advised to present to ED.       Past Medical History:   Diagnosis Date    CLARISSE (acute kidney injury) 1/11/2021    CHF (congestive heart failure)     Chronic combined systolic and diastolic congestive heart failure 1/9/2021    Coronary artery disease     Diabetes mellitus     Hypertension     Kidney stones        Past Surgical History:   Procedure Laterality Date    CARDIAC CATHETERIZATION  2010    no stents    CARDIAC DEFIBRILLATOR PLACEMENT  2010    CARDIAC DEFIBRILLATOR PLACEMENT      HERNIA REPAIR      IRRIGATION OF MEDIASTINUM N/A 1/14/2021    Procedure: IRRIGATION, MEDIASTINUM;  Surgeon: Zenon Corona MD;  Location: [facility] OR [address] [place] [address];  Service: Cardiovascular;  Laterality: N/A;    KNEE ARTHROSCOPY Right     LEFT VENTRICULAR ASSIST DEVICE Left 1/13/2021    Procedure: INSERTION- HeartMate 3 LEFT VENTRICULAR ASSIST DEVICE ;  Surgeon: Zenon Corona MD;  [facility];  " Service: Cardiovascular;  Laterality: Left;    RECONSTRUCTION OF PERICARDIUM N/A 1/14/2021    Procedure: RECONSTRUCTION, PERICARDIUM;  Surgeon: Zenon Corona MD;  [facility];  Service: Cardiovascular;  Laterality: N/A;    RIGHT HEART CATHETERIZATION Right 11/2/2020    Procedure: INSERTION, CATHETER, RIGHT HEART;  Surgeon: Deana Lake MD;  Location: [facility] CATH LAB;  Service: Cardiology;  Laterality: Right;    STERNAL WOUND CLOSURE  1/13/2021    Procedure: TEMPORARY CLOSURE OF CHEST;  Surgeon: Zenon Corona MD;  [facility];  Service: Cardiovascular;;    STERNAL WOUND CLOSURE N/A 1/14/2021    Procedure: CLOSURE, WOUND, STERNUM;  Surgeon: Zenon Corona MD;  [facility];  Service: Cardiovascular;  Laterality: N/A;       Review of patient's allergies indicates:   Allergen Reactions    Pcn [penicillins] Hives       No current facility-administered medications on file prior to encounter.     Current Outpatient Medications on File Prior to Encounter   Medication Sig    acetaminophen (TYLENOL) 500 mg Cap Take 2 capsules (1,000 mg total) by mouth every 8 (eight) hours as needed (Pain).    atorvastatin (LIPITOR) 80 MG tablet Take 1 tablet by mouth once daily    docusate sodium (COLACE) 100 MG capsule Take 100 mg by mouth Daily.    furosemide (LASIX) 80 MG tablet Take 1 tablet (80 mg total) by mouth 2 (two) times daily.    lisinopriL (PRINIVIL,ZESTRIL) 20 MG tablet Take 1/2 tablet (10 mg total) by mouth every morning and take 1 tablet (20 mg total) every evening.    magnesium oxide (MAG-OX) 400 mg (241.3 mg magnesium) tablet Take 1 tablet (400 mg total) by mouth 3 (three) times daily.    polyethylene glycol (GLYCOLAX) 17 gram PwPk Mix 1 packet (17 g) with liquid and take by mouth daily as needed.    SITagliptin (JANUVIA) 100 MG Tab Take 1 tablet (100 mg total) by mouth once daily.    spironolactone (ALDACTONE) 25 MG tablet Take 1 tablet (25 mg total) by mouth once  daily.    warfarin (COUMADIN) 5 MG tablet Take 1 tablet (5 mg total) by mouth Daily.     Family History       Problem Relation (Age of Onset)    Heart attack Mother, Brother    Heart failure Brother    Hypertension Brother          Tobacco Use    Smoking status: Current Some Day Smoker     Types: Cigarettes    Smokeless tobacco: Never Used   Substance and Sexual Activity    Alcohol use: No    Drug use: Not on file    Sexual activity: Not on file     Review of Systems   Constitutional: Negative for chills and fever.   Cardiovascular:  Negative for chest pain, dyspnea on exertion, palpitations and syncope.   Respiratory:  Negative for cough and sleep disturbances due to breathing.    Musculoskeletal:  Negative for back pain.   Gastrointestinal:  Negative for abdominal pain, nausea and vomiting.   Neurological:  Positive for dizziness, light-headedness and loss of balance. Negative for focal weakness.   Psychiatric/Behavioral:  Negative for altered mental status.    Objective:     Vital Signs (Most Recent):  Temp: 98.7 °F (37.1 °C) (03/22/22 0959)  Pulse: (!) 29 (03/22/22 0959)  Resp: 18 (03/22/22 0959)  BP: (!) 92/0 (03/22/22 1244)  SpO2: 99 % (03/22/22 0959)   Vital Signs (24h Range):  Temp:  [98.7 °F (37.1 °C)] 98.7 °F (37.1 °C)  Pulse:  [29] 29  Resp:  [18] 18  SpO2:  [99 %] 99 %  BP: (92)/(0) 92/0     Weight: 85.7 kg (189 lb)  Body mass index is 24.27 kg/m².    SpO2: 99 %       No intake or output data in the 24 hours ending 03/22/22 1313    Lines/Drains/Airways       Line  Duration                  VAD 01/13/21 1211 Left ventricular assist device HeartMate 3 433 days              Peripheral Intravenous Line  Duration                  Peripheral IV - Single Lumen 03/22/22 1037 18 G Left Forearm <1 day                    Physical Exam  Constitutional:       General: He is not in acute distress.     Appearance: He is well-developed. He is not diaphoretic.   HENT:      Head: Normocephalic and atraumatic.    Eyes:      Conjunctiva/sclera: Conjunctivae normal.   Neck:      Trachea: No tracheal deviation.   Cardiovascular:      Comments: VAD hum  JVP to angle of jaw ay 45 degrees  Pulmonary:      Effort: Pulmonary effort is normal. No respiratory distress.      Breath sounds: Normal breath sounds. No wheezing.   Abdominal:      General: Bowel sounds are normal. There is no distension.      Palpations: Abdomen is soft.      Tenderness: There is no abdominal tenderness.   Musculoskeletal:         General: Normal range of motion.      Cervical back: Normal range of motion.   Neurological:      Mental Status: He is alert and oriented to person, place, and time.       Significant Labs: All pertinent lab results from the last 24 hours have been reviewed.    Significant Imaging: All pertinent images from the last 24h have been reviewed.      Assessment and Plan:     Dizziness  - CTA head and neck without acute changes. Etiology unclear. Neuro consult.     LVAD (left ventricular assist device) present  - No VAD alarms. INR above his therapeutic range (1.5-2)  - He is overloaded on exam, start diuresis with furosemide 80 mg IV TID        VTE Risk Mitigation (From admission, onward)    None        Lavon Otto MD  Cardiology   [facility] - Emergency Dept

## 2022-03-22 NOTE — TELEPHONE ENCOUNTER
----- Message from Zaida Renner RN sent at 3/21/2022 11:36 AM CDT -----  Regarding: ramone Burt

## 2022-03-22 NOTE — PROGRESS NOTES
03/22/22 1656 03/22/22 1707 03/22/22 1709   Vital Signs   BP (!) 102/0 (!) 98/0 (!) 104/0   BP Location Left arm Left arm Left arm   BP Method Doppler Doppler Doppler   Patient Position Lying Sitting Lying   Orthostatic VS Yes Yes Yes   Is Patient Symptomatic in this Position? No No No     Orthostatics complete

## 2022-03-22 NOTE — SUBJECTIVE & OBJECTIVE
Past Medical History:   Diagnosis Date    CLARISSE (acute kidney injury) 1/11/2021    CHF (congestive heart failure)     Chronic combined systolic and diastolic congestive heart failure 1/9/2021    Coronary artery disease     Diabetes mellitus     Hypertension     Kidney stones        Past Surgical History:   Procedure Laterality Date    CARDIAC CATHETERIZATION  2010    no stents    CARDIAC DEFIBRILLATOR PLACEMENT  2010    CARDIAC DEFIBRILLATOR PLACEMENT      HERNIA REPAIR      IRRIGATION OF MEDIASTINUM N/A 1/14/2021    Procedure: IRRIGATION, MEDIASTINUM;  Surgeon: Zenon Corona MD;  [facility];  Service: Cardiovascular;  Laterality: N/A;    KNEE ARTHROSCOPY Right     LEFT VENTRICULAR ASSIST DEVICE Left 1/13/2021    Procedure: INSERTION- HeartMate 3 LEFT VENTRICULAR ASSIST DEVICE ;  Surgeon: Zenon Corona MD;  [facility];  Service: Cardiovascular;  Laterality: Left;    RECONSTRUCTION OF PERICARDIUM N/A 1/14/2021    Procedure: RECONSTRUCTION, PERICARDIUM;  Surgeon: Zenon Corona MD;  [facility];  Service: Cardiovascular;  Laterality: N/A;    RIGHT HEART CATHETERIZATION Right 11/2/2020    Procedure: INSERTION, CATHETER, RIGHT HEART;  Surgeon: Deana Lake MD;  Location: [facility] CATH LAB;  Service: Cardiology;  Laterality: Right;    STERNAL WOUND CLOSURE  1/13/2021    Procedure: TEMPORARY CLOSURE OF CHEST;  Surgeon: Zenno Corona MD;  [facility];  Service: Cardiovascular;;    STERNAL WOUND CLOSURE N/A 1/14/2021    Procedure: CLOSURE, WOUND, STERNUM;  Surgeon: Zenon Corona MD;  [facility];  Service: Cardiovascular;  Laterality: N/A;       Review of patient's allergies indicates:   Allergen Reactions    Pcn [penicillins] Hives       No current facility-administered medications on file prior to encounter.     Current Outpatient Medications on File Prior to Encounter   Medication Sig    acetaminophen (TYLENOL) 500 mg Cap Take 2 capsules (1,000 mg  total) by mouth every 8 (eight) hours as needed (Pain).    atorvastatin (LIPITOR) 80 MG tablet Take 1 tablet by mouth once daily    docusate sodium (COLACE) 100 MG capsule Take 100 mg by mouth Daily.    furosemide (LASIX) 80 MG tablet Take 1 tablet (80 mg total) by mouth 2 (two) times daily.    lisinopriL (PRINIVIL,ZESTRIL) 20 MG tablet Take 1/2 tablet (10 mg total) by mouth every morning and take 1 tablet (20 mg total) every evening.    magnesium oxide (MAG-OX) 400 mg (241.3 mg magnesium) tablet Take 1 tablet (400 mg total) by mouth 3 (three) times daily.    polyethylene glycol (GLYCOLAX) 17 gram PwPk Mix 1 packet (17 g) with liquid and take by mouth daily as needed.    SITagliptin (JANUVIA) 100 MG Tab Take 1 tablet (100 mg total) by mouth once daily.    spironolactone (ALDACTONE) 25 MG tablet Take 1 tablet (25 mg total) by mouth once daily.    warfarin (COUMADIN) 5 MG tablet Take 1 tablet (5 mg total) by mouth Daily.     Family History       Problem Relation (Age of Onset)    Heart attack Mother, Brother    Heart failure Brother    Hypertension Brother          Tobacco Use    Smoking status: Current Some Day Smoker     Types: Cigarettes    Smokeless tobacco: Never Used   Substance and Sexual Activity    Alcohol use: No    Drug use: Not on file    Sexual activity: Not on file     Review of Systems   Constitutional:  Negative for chills and fever.   HENT:  Negative for rhinorrhea and sneezing.    Eyes:  Positive for visual disturbance. Negative for redness.   Respiratory:  Negative for cough and shortness of breath.    Cardiovascular:  Negative for chest pain and palpitations.   Gastrointestinal:  Negative for vomiting.   Skin:  Negative for pallor and rash.   Neurological:  Positive for dizziness, light-headedness and headaches. Negative for speech difficulty and weakness.   Psychiatric/Behavioral:  Negative for confusion and decreased concentration.    Objective:     Vital Signs (Most Recent):  Temp: 98.7 °F (37.1  °C) (03/22/22 0959)  Pulse: 104 (03/22/22 1413)  Resp: 18 (03/22/22 0959)  BP: (!) 92/0 (03/22/22 1244)  SpO2: 99 % (03/22/22 0959)   Vital Signs (24h Range):  Temp:  [98.7 °F (37.1 °C)] 98.7 °F (37.1 °C)  Pulse:  [] 104  Resp:  [18] 18  SpO2:  [99 %] 99 %  BP: (92)/(0) 92/0     Weight: 85.7 kg (189 lb)  Body mass index is 24.27 kg/m².    Physical Exam  Constitutional:       Appearance: Normal appearance.   HENT:      Head: Normocephalic and atraumatic.      Mouth/Throat:      Mouth: Mucous membranes are moist.      Pharynx: Oropharynx is clear.   Eyes:      General: No scleral icterus.     Extraocular Movements: Extraocular movements intact and EOM normal.      Conjunctiva/sclera: Conjunctivae normal.      Pupils: Pupils are equal, round, and reactive to light.   Cardiovascular:      Rate and Rhythm: Normal rate.      Pulses: Normal pulses.   Pulmonary:      Effort: Pulmonary effort is normal. No respiratory distress.   Abdominal:      General: Abdomen is flat. There is no distension.      Tenderness: There is no abdominal tenderness.   Skin:     General: Skin is warm and dry.      Coloration: Skin is not jaundiced.   Neurological:      Mental Status: He is alert and oriented to person, place, and time.      Cranial Nerves: No cranial nerve deficit.      Sensory: No sensory deficit.      Motor: No weakness.      Coordination: Coordination normal. Finger-Nose-Finger Test and Heel to Shin Test normal.      Deep Tendon Reflexes: Strength normal. Reflexes normal.      Reflex Scores:       Tricep reflexes are 2+ on the right side and 2+ on the left side.       Bicep reflexes are 2+ on the right side and 2+ on the left side.       Brachioradialis reflexes are 2+ on the right side and 2+ on the left side.       Patellar reflexes are 2+ on the right side and 2+ on the left side.       Achilles reflexes are 2+ on the right side and 2+ on the left side.  Psychiatric:         Speech: Speech normal.       NEUROLOGICAL  EXAMINATION:     MENTAL STATUS   Oriented to person, place, and time.   Attention: normal. Concentration: normal.   Speech: speech is normal   Level of consciousness: alert  Knowledge: good.     CRANIAL NERVES     CN II   Visual fields full to confrontation.     CN III, IV, VI   Pupils are equal, round, and reactive to light.  Extraocular motions are normal.   CN III: no CN III palsy  CN VI: no CN VI palsy    CN V   Facial sensation intact.     CN VII   Facial expression full, symmetric.     CN VIII   CN VIII normal.     CN IX, X   CN IX normal.   CN X normal.     CN XI   CN XI normal.     CN XII   CN XII normal.     MOTOR EXAM   Muscle bulk: normal  Overall muscle tone: normal  Right arm tone: normal  Left arm tone: normal  Right leg tone: normal  Left leg tone: normal    Strength   Strength 5/5 throughout.     REFLEXES     Reflexes   Right brachioradialis: 2+  Left brachioradialis: 2+  Right biceps: 2+  Left biceps: 2+  Right triceps: 2+  Left triceps: 2+  Right patellar: 2+  Left patellar: 2+  Right achilles: 2+  Left achilles: 2+  Right : 2+  Left : 2+  Right Bolaños: absent  Left Bolaños: absent  Right ankle clonus: absent  Left ankle clonus: absent    SENSORY EXAM   Light touch normal.     GAIT AND COORDINATION      Coordination   Finger to nose coordination: normal  Heel to shin coordination: normal    Tremor   Resting tremor: absent  Intention tremor: absent    Significant Labs: All pertinent lab results from the past 24 hours have been reviewed.    Significant Imaging: I have reviewed all pertinent imaging results/findings within the past 24 hours.

## 2022-03-22 NOTE — ED TRIAGE NOTES
Pt reports dizziness/lightheaded since Sun and HA. Pt denies abdominal pain, c/p, SOB. LVAD pt, has back up equipment with him, supplies charted.      LOC: The patient is awake, alert and aware of environment with an appropriate affect, the patient is oriented x 3 and speaking appropriately.  APPEARANCE: Patient resting comfortably and in no acute distress, patient is clean and well groomed, patient's clothing is properly fastened.  SKIN: The skin is warm and dry, color consistent with ethnicity, patient has normal skin turgor and moist mucus membranes, skin intact, no breakdown or bruising noted.  MUSCULOSKELETAL: Patient moving all extremities spontaneously, no obvious swelling or deformities noted.  RESPIRATORY: Airway is open and patent, respirations are spontaneous, patient has a normal effort and rate, no accessory muscle use noted,   CARDIAC: Patient has a normal rate and regular rhythm, no periphreal edema noted, capillary refill < 3 seconds.  ABDOMEN: Soft and non tender to palpation, no distention noted, normoactive bowel sounds present in all four quadrants.  NEUROLOGIC:  facial expression is symmetrical, patient moving all extremities spontaneously, normal sensation in all extremities when touched with a finger.  Follows all commands appropriately.

## 2022-03-22 NOTE — SUBJECTIVE & OBJECTIVE
Past Medical History:   Diagnosis Date    CLARISSE (acute kidney injury) 1/11/2021    CHF (congestive heart failure)     Chronic combined systolic and diastolic congestive heart failure 1/9/2021    Coronary artery disease     Diabetes mellitus     Hypertension     Kidney stones        Past Surgical History:   Procedure Laterality Date    CARDIAC CATHETERIZATION  2010    no stents    CARDIAC DEFIBRILLATOR PLACEMENT  2010    CARDIAC DEFIBRILLATOR PLACEMENT      HERNIA REPAIR      IRRIGATION OF MEDIASTINUM N/A 1/14/2021    Procedure: IRRIGATION, MEDIASTINUM;  Surgeon: Zenon Corona MD;  [facility];  Service: Cardiovascular;  Laterality: N/A;    KNEE ARTHROSCOPY Right     LEFT VENTRICULAR ASSIST DEVICE Left 1/13/2021    Procedure: INSERTION- HeartMate 3 LEFT VENTRICULAR ASSIST DEVICE ;  Surgeon: Zenon Corona MD;  [facility];  Service: Cardiovascular;  Laterality: Left;    RECONSTRUCTION OF PERICARDIUM N/A 1/14/2021    Procedure: RECONSTRUCTION, PERICARDIUM;  Surgeon: Zenon Corona MD;  [facility];  Service: Cardiovascular;  Laterality: N/A;    RIGHT HEART CATHETERIZATION Right 11/2/2020    Procedure: INSERTION, CATHETER, RIGHT HEART;  Surgeon: Deana Lake MD;  Location: [facility] CATH LAB;  Service: Cardiology;  Laterality: Right;    STERNAL WOUND CLOSURE  1/13/2021    Procedure: TEMPORARY CLOSURE OF CHEST;  Surgeon: Zenon Corona MD;  [facility];  Service: Cardiovascular;;    STERNAL WOUND CLOSURE N/A 1/14/2021    Procedure: CLOSURE, WOUND, STERNUM;  Surgeon: Zeonn Corona MD;  [facility];  Service: Cardiovascular;  Laterality: N/A;       Review of patient's allergies indicates:   Allergen Reactions    Pcn [penicillins] Hives       Current Facility-Administered Medications   Medication    acetaminophen tablet 650 mg    furosemide injection 80 mg    hydrALAZINE injection 10 mg     Family History       Problem Relation (Age of Onset)    Heart  attack Mother, Brother    Heart failure Brother    Hypertension Brother          Tobacco Use    Smoking status: Current Some Day Smoker     Types: Cigarettes    Smokeless tobacco: Never Used   Substance and Sexual Activity    Alcohol use: No    Drug use: Not on file    Sexual activity: Not on file     Review of Systems   Constitutional:  Positive for activity change and unexpected weight change. Negative for fever.   HENT:  Negative for dental problem.    Eyes:  Negative for redness.   Respiratory:  Negative for cough, chest tightness and shortness of breath.    Cardiovascular:  Negative for chest pain and palpitations.   Gastrointestinal:  Negative for abdominal pain.   Endocrine: Negative for polydipsia.   Genitourinary:  Negative for dysuria.   Musculoskeletal:  Negative for back pain.   Skin:  Negative for rash.   Allergic/Immunologic: Negative for food allergies.   Neurological:  Positive for light-headedness. Negative for dizziness and facial asymmetry.   Hematological:  Negative for adenopathy.   Psychiatric/Behavioral:  Negative for agitation, behavioral problems and dysphoric mood.    Objective:     Vital Signs (Most Recent):  Temp: 97.1 °F (36.2 °C) (03/22/22 1617)  Pulse: 104 (03/22/22 1413)  Resp: 19 (03/22/22 1617)  BP: (!) 98/0 (03/22/22 1617)  SpO2: 99 % (03/22/22 0959)   Vital Signs (24h Range):  Temp:  [97.1 °F (36.2 °C)-98.7 °F (37.1 °C)] 97.1 °F (36.2 °C)  Pulse:  [] 104  Resp:  [18-19] 19  SpO2:  [99 %] 99 %  BP: (92-98)/(0) 98/0     Patient Vitals for the past 72 hrs (Last 3 readings):   Weight   03/22/22 1613 89 kg (196 lb 3.4 oz)   03/22/22 0959 85.7 kg (189 lb)     Body mass index is 25.19 kg/m².      Intake/Output Summary (Last 24 hours) at 3/22/2022 1651  Last data filed at 3/22/2022 1613  Gross per 24 hour   Intake 360 ml   Output 300 ml   Net 60 ml       Physical Exam  Constitutional:       Appearance: Normal appearance.   HENT:      Right Ear: Tympanic membrane normal.      Left  Ear: Tympanic membrane normal.      Nose: Nose normal.   Eyes:      Extraocular Movements: Extraocular movements intact.   Cardiovascular:      Rate and Rhythm: Regular rhythm. Tachycardia present.      Comments: JVD elevated, 17 cmH2O on my exam  Pulmonary:      Effort: Pulmonary effort is normal.      Breath sounds: Normal breath sounds.   Abdominal:      Tenderness: There is no abdominal tenderness.   Musculoskeletal:         General: Normal range of motion.      Cervical back: Normal range of motion.   Skin:     General: Skin is warm.   Neurological:      General: No focal deficit present.      Mental Status: He is alert.   Psychiatric:         Mood and Affect: Mood normal.       Significant Labs:  CBC:  Recent Labs   Lab 03/22/22  1035 03/22/22  1045   WBC 8.39  --    RBC 3.39*  --    HGB 9.0*  --    HCT 29.8* 30*     --    MCV 88  --    MCH 26.5*  --    MCHC 30.2*  --      BNP:  Recent Labs   Lab 03/22/22  1035   *     CMP:  Recent Labs   Lab 03/22/22  1035   *   CALCIUM 8.7   ALBUMIN 2.5*   PROT 8.0      K 3.0*   CO2 29      BUN 18   CREATININE 1.4   ALKPHOS 173*   ALT 24   AST 36   BILITOT 2.7*      Coagulation:   Recent Labs   Lab 03/17/22  0000 03/21/22  0000 03/22/22  1035   INR 2.0 2.7 2.2*     LDH:  Recent Labs   Lab 03/22/22  1035   *     Microbiology:  Microbiology Results (last 7 days)       ** No results found for the last 168 hours. **            I have reviewed all pertinent labs within the past 24 hours.    Diagnostic Results:  I have reviewed and interpreted all pertinent imaging results/findings within the past 24 hours.

## 2022-03-22 NOTE — HPI
"65 yo BM with stage D CHF due to NICMP s/p HM3, s/p ICD, HTN, HLD, T2DM, h/o subdural/subarachnoid hemorrhage presents to ED with 3 days of dizzness and double vision. Reports feeling "woozy" and lightheaded since Sunday morning. He has also noticed double vision. He also reprots equilibrium issues while walking. His symptoms have been stable since they started. Dizziness is on and off and exacerbates on standing up. He denies any chest pain, falls, increasing SOB or LOC. He does feel his weight is up by "couple pounds". Yesterday he went to get bloodwork for his INR and when he got the call to discuss results, he was advised to present to ED. In the ER, he is volume up and will be brought in for IV diuresis and neurology evaluation.   "

## 2022-03-22 NOTE — ED PROVIDER NOTES
Encounter Date: 3/22/2022       History     Chief Complaint   Patient presents with    Multiple complaints     For 3 days double vision and off balance. Hx subdural, lizz vishal lvad coord notified     Mr France is a 66M with a PMHx of subdural hematoma / subarachnoid hemorrhage in 1/2022, stage D CHF due to non-ischemic cardiomyopathy s/p HM3 LVAD, s/p ICD, HTN, HLD, T2DM who p/w double vision and lightheadedness x 3 days and a mild headache since this morning. The patient cannot recall an inciting event and states that he has not fallen recently. The double vision is constant and occurs both while sitting and standing. He reports difficulty focusing on objects and difficulty with depth perception. He has not had similar sxs before. The pts headache started this morning and is 2/10 in intensity and bilateral, no photophobia or vision loss.  He has difficulty describing the nature of the pain as it is not severe.  He denies unilateral weakness, slurred speech, confusion.  No history of migraines.  No low-flow alarms            Review of patient's allergies indicates:   Allergen Reactions    Pcn [penicillins] Hives     Past Medical History:   Diagnosis Date    CLARISSE (acute kidney injury) 1/11/2021    CHF (congestive heart failure)     Chronic combined systolic and diastolic congestive heart failure 1/9/2021    Coronary artery disease     Diabetes mellitus     Hypertension     Kidney stones      Past Surgical History:   Procedure Laterality Date    CARDIAC CATHETERIZATION  2010    no stents    CARDIAC DEFIBRILLATOR PLACEMENT  2010    CARDIAC DEFIBRILLATOR PLACEMENT      HERNIA REPAIR      IRRIGATION OF MEDIASTINUM N/A 1/14/2021    Procedure: IRRIGATION, MEDIASTINUM;  Surgeon: Zenon Corona MD;  Location: [facility] OR [address] [place] [address];  Service: Cardiovascular;  Laterality: N/A;    KNEE ARTHROSCOPY Right     LEFT VENTRICULAR ASSIST DEVICE Left 1/13/2021    Procedure: INSERTION- HeartMate 3 LEFT VENTRICULAR ASSIST  DEVICE ;  Surgeon: Zenon Corona MD;  [address];  Service: Cardiovascular;  Laterality: Left;    RECONSTRUCTION OF PERICARDIUM N/A 1/14/2021    Procedure: RECONSTRUCTION, PERICARDIUM;  Surgeon: Zenon Corona MD;  [facility];  Service: Cardiovascular;  Laterality: N/A;    RIGHT HEART CATHETERIZATION Right 11/2/2020    Procedure: INSERTION, CATHETER, RIGHT HEART;  Surgeon: Deana Lake MD;  Location: [facility] CATH LAB;  Service: Cardiology;  Laterality: Right;    STERNAL WOUND CLOSURE  1/13/2021    Procedure: TEMPORARY CLOSURE OF CHEST;  Surgeon: Zenon Corona MD;  [facility];  Service: Cardiovascular;;    STERNAL WOUND CLOSURE N/A 1/14/2021    Procedure: CLOSURE, WOUND, STERNUM;  Surgeon: Zenon Corona MD;  [facility];  Service: Cardiovascular;  Laterality: N/A;     Family History   Problem Relation Age of Onset    Heart attack Mother     Heart failure Brother     Heart attack Brother     Hypertension Brother      Social History     Tobacco Use    Smoking status: Current Some Day Smoker     Types: Cigarettes    Smokeless tobacco: Never Used   Substance Use Topics    Alcohol use: No     Review of Systems   Constitutional: Negative for chills and fever.   HENT: Negative for congestion and sore throat.    Eyes: Positive for visual disturbance (Double vision).   Respiratory: Negative for cough and shortness of breath.    Cardiovascular: Negative for chest pain.   Gastrointestinal: Negative for abdominal pain, nausea and vomiting.   Genitourinary: Negative for dysuria.   Musculoskeletal: Negative for back pain.   Skin: Negative for rash.   Neurological: Positive for light-headedness and numbness. Negative for weakness.   Hematological: Does not bruise/bleed easily.       Physical Exam     Initial Vitals   BP Pulse Resp Temp SpO2   03/22/22 1244 03/22/22 0959 03/22/22 0959 03/22/22 0959 03/22/22 0959   (!) 92/0 (!) 29 18 98.7 °F (37.1 °C) 99 %       MAP       --                Physical Exam    Nursing note and vitals reviewed.  Constitutional: He appears well-developed and well-nourished. No distress.   HENT:   Mouth/Throat: Oropharynx is clear and moist.   Eyes: Conjunctivae are normal.   Neck: Neck supple.   Cardiovascular:   LVAD home   Pulmonary/Chest: Breath sounds normal. He has no wheezes. He has no rales.   Abdominal: Abdomen is soft. He exhibits no distension. There is no abdominal tenderness.   Driveline dressing clean and intact There is no rebound and no guarding.   Musculoskeletal:         General: No edema.      Cervical back: Neck supple.     Lymphadenopathy:     He has no cervical adenopathy.   Neurological: He is alert and oriented to person, place, and time. He has normal strength. No cranial nerve deficit or sensory deficit.   Normal heel to shin, finger-to-nose   Skin: Skin is warm. Capillary refill takes less than 2 seconds. No rash noted.   Psychiatric: He has a normal mood and affect.         ED Course   Procedures  Labs Reviewed   CBC W/ AUTO DIFFERENTIAL - Abnormal; Notable for the following components:       Result Value    RBC 3.39 (*)     Hemoglobin 9.0 (*)     Hematocrit 29.8 (*)     MCH 26.5 (*)     MCHC 30.2 (*)     RDW 15.5 (*)     Mono # 1.1 (*)     All other components within normal limits   COMPREHENSIVE METABOLIC PANEL - Abnormal; Notable for the following components:    Potassium 3.0 (*)     Glucose 123 (*)     Albumin 2.5 (*)     Total Bilirubin 2.7 (*)     Alkaline Phosphatase 173 (*)     eGFR if non  52.0 (*)     All other components within normal limits   TROPONIN I - Abnormal; Notable for the following components:    Troponin I 0.033 (*)     All other components within normal limits   PROTIME-INR - Abnormal; Notable for the following components:    Prothrombin Time 22.1 (*)     INR 2.2 (*)     All other components within normal limits   LACTATE DEHYDROGENASE - Abnormal; Notable for the following  components:     (*)     All other components within normal limits   B-TYPE NATRIURETIC PEPTIDE - Abnormal; Notable for the following components:     (*)     All other components within normal limits   ISTAT PROCEDURE - Abnormal; Notable for the following components:    POC Glucose 128 (*)     POC Creatinine 1.5 (*)     POC Potassium 3.0 (*)     POC Ionized Calcium 1.00 (*)     POC Hematocrit 30 (*)     All other components within normal limits   MAGNESIUM   MAGNESIUM    Narrative:     ADD ON mg PER MD PALMER 03/22/22 @1456   HEPATITIS C ANTIBODY   ISTAT CHEM8        ECG Results    None       Imaging Results          CTA Head and Neck (xpd) (Final result)  Result time 03/22/22 11:56:30    Final result by Emile Wilson MD (03/22/22 11:56:30)                 Impression:      No evidence of acute parenchymal hemorrhage or major vascular distribution infarct.    Trace residual chronic subdural hematoma over the right frontal lobe.    Mild scattered atherosclerotic calcification without evidence of high-grade stenosis or large vessel occlusion.    Subtle heterogeneity with curvilinear hypoattenuation at the carotid bulbs bilaterally.  Finding likely reflecting contrast mixing from non-laminar flow.  Correlation with carotid ultrasound suggested to definitively exclude possibility of intraluminal thrombus.    Exam not optimized for evaluation of soft tissues of the neck.  However, there appears to be a 1.8 rounded hyperattenuating focus at midline tongue base.  Possible ectopic thyroid, but recommend clinical correlation follow-up.    Additional incidental findings as above.      Electronically signed by: Emile Wilson MD  Date:    03/22/2022  Time:    11:56             Narrative:    EXAMINATION:  CTA HEAD AND NECK (XPD)    CLINICAL HISTORY:  Vertigo, central;Dizziness, persistent/recurrent, cardiac or vascular cause suspected;    TECHNIQUE:  Axial CT images obtained throughout the region of the head  before and after the administration of intravenous contrast.  CT angiogram was performed from through the cervical and intracranial vasculature during the IV bolus administration of 100mL of Omnipaque 350.  Multiplanar MPR and MIP reformats were performed.    CT source data was analyzed using artificial intelligence software for detection of a large vessel occlusions (LVO) in order to enable computer assisted triage notification and aid clinical stroke decision making.    COMPARISON:  None    FINDINGS:  Prominence of the ventricles and sulci compatible with mild generalized cerebral volume loss.  No hydrocephalus.    No evidence of recent or remote major vascular distribution infarct.  No acute parenchymal hemorrhage.  No mass effect or midline shift.  No abnormal parenchymal enhancement.    Thin subdural collection extends over the right frontal lobe anteriorly (sequence 2 image 16).  This is similar in attenuation to the adjacent brain parenchyma likely a chronic blood products.  Collection measuring just 3 mm thickness without significant intracranial mass effect.    The cranium appears intact. Mastoid air cells and paranasal sinuses are essentially clear.    Patulous fluid-filled esophagus.    Rounded 1.8 cm midline hyperattenuating focus in tongue base, just above the hyoid bone (sequence 5 image 272).    Pacing device in left chest wall.    Mild cervical spondylosis.    Endodontal disease with multiple missing teeth.      CTA:    Postsurgical change prior median sternotomy for apparent LVAD (only partially visualized).    Mild calcification of the aortic arch without significant stenosis of the major branch vessel origins.    Right common carotid artery normal in caliber.  Punctate calcification at right carotid bifurcation without associated narrowing.  Heterogeneity with curvilinear hypoattenuation at the carotid bulb likely from non laminar flow.  Right internal carotid artery normal in caliber with mild  calcification in the cavernous segment.    The left common carotid artery normal in caliber.  Mild wall calcification at the carotid bifurcation without associated narrowing.  Heterogeneity with curvilinear hypoattenuation at the carotid bulb likely reflecting contrast mixing from non laminar flow.  Left internal carotid artery demonstrating no significant stenosis.  Mild calcification in the cavernous segment.  Small conical shaped outpouching from the supraclinoid ICA keeping with a infundibulum at the origin of the posterior communicating artery.    The vertebral origins are patent. The cervical vertebral arteries are normal in course and caliber. Vertebrobasilar system is within normal limits without focal abnormality.    The anterior, middle, and posterior cerebral arteries are within normal limits, without evidence of significant stenosis, focal occlusion, or intracranial aneurysm formation.                                 Medications   furosemide injection 80 mg (80 mg Intravenous Given 3/23/22 0604)   acetaminophen tablet 650 mg (has no administration in time range)   docusate sodium capsule 100 mg (100 mg Oral Not Given 3/22/22 1800)   polyethylene glycol packet 17 g (has no administration in time range)   spironolactone tablet 25 mg (25 mg Oral Given 3/23/22 0831)   atorvastatin tablet 80 mg (80 mg Oral Given 3/23/22 0831)   glucose chewable tablet 16 g (has no administration in time range)   glucose chewable tablet 24 g (has no administration in time range)   glucagon (human recombinant) injection 1 mg (has no administration in time range)   dextrose 10% bolus 125 mL (has no administration in time range)   dextrose 10% bolus 250 mL (has no administration in time range)   lisinopriL tablet 10 mg (10 mg Oral Given 3/23/22 0831)   lisinopriL tablet 20 mg (20 mg Oral Given 3/22/22 1805)   amLODIPine tablet 10 mg (has no administration in time range)   iohexoL (OMNIPAQUE 350) injection 100 mL (100 mLs  Intravenous Given 3/22/22 1126)   potassium chloride CR capsule 30 mEq (30 mEq Oral Given 3/22/22 1237)   potassium chloride SA CR tablet 40 mEq (40 mEq Oral Given 3/22/22 1805)   hydrALAZINE injection 10 mg (10 mg Intravenous Given 3/22/22 1711)   hydrALAZINE injection 10 mg (10 mg Intravenous Given 3/22/22 1745)   potassium chloride 10 mEq in 100 mL IVPB (10 mEq Intravenous New Bag 3/22/22 2008)   digoxin injection 500 mcg (500 mcg Intravenous Given 3/22/22 1944)   hydrALAZINE injection 10 mg (10 mg Intravenous Given 3/22/22 2203)   digoxin injection 250 mcg (250 mcg Intravenous Given 3/23/22 0504)   hydrALAZINE injection 10 mg (10 mg Intravenous Given 3/23/22 0900)   amLODIPine tablet 5 mg (5 mg Oral Given 3/23/22 1103)     Medical Decision Making:   History:   Old Medical Records: I decided to obtain old medical records.  Initial Assessment:   Emergent evaluation of 66-year-old male presenting today with lightheadedness, double vision, ataxia since Saturday.  Denies infectious symptoms  Neuro exam unremarkable  Vital signs stable  Differential Diagnosis:   Complex migraine, posterior CVA, intracranial hemorrhage, stroke, electrolyte derangement, hypoperfusion, LVAD failure  Independently Interpreted Test(s):   I have ordered and independently interpreted X-rays - see prior notes.  I have ordered and independently interpreted EKG Reading(s) - see prior notes  Clinical Tests:   Lab Tests: Reviewed and Ordered  Radiological Study: Ordered and Reviewed  Medical Tests: Ordered and Reviewed  ED Management:  - labs  - EKG  - CXR   - CTA head and neck  - cardiology consult.              ED Course as of 03/23/22 1141   Tue Mar 22, 2022   1123 Potassium(!): 3.0 [GM]   1123 BUN: 18 [GM]   1123 Creatinine: 1.4 [GM]   1123 Hemoglobin(!): 9.0 [GM]   1135 INR(!): 2.2 [GM]   1135 LD(!): 315 [GM]   1135 Potassium(!): 3.0 [GM]   1135 BNP(!): 616 [GM]   1135 Troponin I(!): 0.033 [GM]   1141 Potassium repleted. [GM]   1141  Discussed with Cardiology, will evaluate patient.  CTA pending [GM]   1222 Impression:     No evidence of acute parenchymal hemorrhage or major vascular distribution infarct.     Trace residual chronic subdural hematoma over the right frontal lobe.     Mild scattered atherosclerotic calcification without evidence of high-grade stenosis or large vessel occlusion.     Subtle heterogeneity with curvilinear hypoattenuation at the carotid bulbs bilaterally.  Finding likely reflecting contrast mixing from non-laminar flow.  Correlation with carotid ultrasound suggested to definitively exclude possibility of intraluminal thrombus.     Exam not optimized for evaluation of soft tissues of the neck.  However, there appears to be a 1.8 rounded hyperattenuating focus at midline tongue base.  Possible ectopic thyroid, but recommend clinical correlation follow-up.     Additional incidental findings as above.        Electronically signed by: Emile Wilson MD  Date:                                            03/22/2022  Time:                                           11:56 [GM]   1222 Pending cardiology plan   [GM]   1348 Discussed with Cardiology, plan to admit for further treatment and evaluation.  Patient is unable to get an MRI due to his LVAD.     [GM]      ED Course User Index  [GM] Radha Wilkins MD             Clinical Impression:   Final diagnoses:  [R42] Dizziness          ED Disposition Condition    Observation               Radha Wilkins MD  03/23/22 1141

## 2022-03-22 NOTE — Clinical Note
The PA catheter was repositioned to the pulmonary wedge. Hemodynamics were performed. O2 saturation was measured at 95%.

## 2022-03-22 NOTE — PROGRESS NOTES
03/22/22 1810   Vital Signs   Pulse (!) 142   BP (!) 90/0   BP Location Left arm   BP Method Doppler   Patient Position Lying     MD Mario notified. Patient is asymptomatic. Waiting on K lab results. Will recheck doppler at 1850.

## 2022-03-22 NOTE — SUBJECTIVE & OBJECTIVE
Past Medical History:   Diagnosis Date    CLARISSE (acute kidney injury) 1/11/2021    CHF (congestive heart failure)     Chronic combined systolic and diastolic congestive heart failure 1/9/2021    Coronary artery disease     Diabetes mellitus     Hypertension     Kidney stones        Past Surgical History:   Procedure Laterality Date    CARDIAC CATHETERIZATION  2010    no stents    CARDIAC DEFIBRILLATOR PLACEMENT  2010    CARDIAC DEFIBRILLATOR PLACEMENT      HERNIA REPAIR      IRRIGATION OF MEDIASTINUM N/A 1/14/2021    Procedure: IRRIGATION, MEDIASTINUM;  Surgeon: Zenon Corona MD;  [facility];  Service: Cardiovascular;  Laterality: N/A;    KNEE ARTHROSCOPY Right     LEFT VENTRICULAR ASSIST DEVICE Left 1/13/2021    Procedure: INSERTION- HeartMate 3 LEFT VENTRICULAR ASSIST DEVICE ;  Surgeon: Zenon Corona MD;  [facility];  Service: Cardiovascular;  Laterality: Left;    RECONSTRUCTION OF PERICARDIUM N/A 1/14/2021    Procedure: RECONSTRUCTION, PERICARDIUM;  Surgeon: Zenon Corona MD;  [facility];  Service: Cardiovascular;  Laterality: N/A;    RIGHT HEART CATHETERIZATION Right 11/2/2020    Procedure: INSERTION, CATHETER, RIGHT HEART;  Surgeon: Deana Lake MD;  Location: [facility] CATH LAB;  Service: Cardiology;  Laterality: Right;    STERNAL WOUND CLOSURE  1/13/2021    Procedure: TEMPORARY CLOSURE OF CHEST;  Surgeon: Zenon Corona MD;  [facility];  Service: Cardiovascular;;    STERNAL WOUND CLOSURE N/A 1/14/2021    Procedure: CLOSURE, WOUND, STERNUM;  Surgeon: Zenon Corona MD;  [facility];  Service: Cardiovascular;  Laterality: N/A;       Review of patient's allergies indicates:   Allergen Reactions    Pcn [penicillins] Hives       No current facility-administered medications on file prior to encounter.     Current Outpatient Medications on File Prior to Encounter   Medication Sig    acetaminophen (TYLENOL) 500 mg Cap Take 2  capsules (1,000 mg total) by mouth every 8 (eight) hours as needed (Pain).    atorvastatin (LIPITOR) 80 MG tablet Take 1 tablet by mouth once daily    docusate sodium (COLACE) 100 MG capsule Take 100 mg by mouth Daily.    furosemide (LASIX) 80 MG tablet Take 1 tablet (80 mg total) by mouth 2 (two) times daily.    lisinopriL (PRINIVIL,ZESTRIL) 20 MG tablet Take 1/2 tablet (10 mg total) by mouth every morning and take 1 tablet (20 mg total) every evening.    magnesium oxide (MAG-OX) 400 mg (241.3 mg magnesium) tablet Take 1 tablet (400 mg total) by mouth 3 (three) times daily.    polyethylene glycol (GLYCOLAX) 17 gram PwPk Mix 1 packet (17 g) with liquid and take by mouth daily as needed.    SITagliptin (JANUVIA) 100 MG Tab Take 1 tablet (100 mg total) by mouth once daily.    spironolactone (ALDACTONE) 25 MG tablet Take 1 tablet (25 mg total) by mouth once daily.    warfarin (COUMADIN) 5 MG tablet Take 1 tablet (5 mg total) by mouth Daily.     Family History       Problem Relation (Age of Onset)    Heart attack Mother, Brother    Heart failure Brother    Hypertension Brother          Tobacco Use    Smoking status: Current Some Day Smoker     Types: Cigarettes    Smokeless tobacco: Never Used   Substance and Sexual Activity    Alcohol use: No    Drug use: Not on file    Sexual activity: Not on file     Review of Systems   Constitutional: Negative for chills and fever.   Cardiovascular:  Negative for chest pain, dyspnea on exertion, palpitations and syncope.   Respiratory:  Negative for cough and sleep disturbances due to breathing.    Musculoskeletal:  Negative for back pain.   Gastrointestinal:  Negative for abdominal pain, nausea and vomiting.   Neurological:  Positive for dizziness, light-headedness and loss of balance. Negative for focal weakness.   Psychiatric/Behavioral:  Negative for altered mental status.    Objective:     Vital Signs (Most Recent):  Temp: 98.7 °F (37.1 °C) (03/22/22 0959)  Pulse:  (!) 29 (03/22/22 0959)  Resp: 18 (03/22/22 0959)  BP: (!) 92/0 (03/22/22 1244)  SpO2: 99 % (03/22/22 0959)   Vital Signs (24h Range):  Temp:  [98.7 °F (37.1 °C)] 98.7 °F (37.1 °C)  Pulse:  [29] 29  Resp:  [18] 18  SpO2:  [99 %] 99 %  BP: (92)/(0) 92/0     Weight: 85.7 kg (189 lb)  Body mass index is 24.27 kg/m².    SpO2: 99 %       No intake or output data in the 24 hours ending 03/22/22 1313    Lines/Drains/Airways       Line  Duration                  VAD 01/13/21 1211 Left ventricular assist device HeartMate 3 433 days              Peripheral Intravenous Line  Duration                  Peripheral IV - Single Lumen 03/22/22 1037 18 G Left Forearm <1 day                    Physical Exam  Constitutional:       General: He is not in acute distress.     Appearance: He is well-developed. He is not diaphoretic.   HENT:      Head: Normocephalic and atraumatic.   Eyes:      Conjunctiva/sclera: Conjunctivae normal.   Neck:      Trachea: No tracheal deviation.   Cardiovascular:      Comments: VAD hum  JVP to angle of jaw ay 45 degrees  Pulmonary:      Effort: Pulmonary effort is normal. No respiratory distress.      Breath sounds: Normal breath sounds. No wheezing.   Abdominal:      General: Bowel sounds are normal. There is no distension.      Palpations: Abdomen is soft.      Tenderness: There is no abdominal tenderness.   Musculoskeletal:         General: Normal range of motion.      Cervical back: Normal range of motion.   Neurological:      Mental Status: He is alert and oriented to person, place, and time.       Significant Labs: All pertinent lab results from the last 24 hours have been reviewed.    Significant Imaging: All pertinent images from the last 24h have been reviewed.

## 2022-03-22 NOTE — PROGRESS NOTES
03/22/22 1846   Vital Signs   Pulse (!) 142   BP (!) 100/0   BP Location Left arm   BP Method Doppler   Patient Position Lying     MD Mario notified. No new orders given. Will continue to monitor.

## 2022-03-22 NOTE — ASSESSMENT & PLAN NOTE
Procedure: Device Interrogation Including analysis of device parameters  Current Settings: Ventricular Assist Device  Review of device function is stable  INR 2.2, goal is 1.5-2.0 per last AC visit note, hold coumadin   Volume up, JVD elevated, IVP lasix 80 TID  K 3.0, will replete aggressively and f/up K level in 6 hours    TXP LVAD INTERROGATIONS 3/22/2022 3/22/2022 3/22/2022 3/10/2022 2/17/2022 2/16/2022 2/16/2022   Type - HeartMate3 HeartMate3 - HeartMate3 HeartMate3 HeartMate3   Flow 3.4 3.9 3.9 - 4.3 4.3 4.1   Speed 4000 5000 5050 - 5050 5000 5000   PI 7.9 6.2 4.9 - 4.7 3.9 6.1   Power (Edwards) 3.4 3.4 3.3 - 3.4 3.4 3.4   LSL 4600 4600 - - 4600 4600 4600   Pulsatility - - No Pulse No Pulse No Pulse No Pulse No Pulse

## 2022-03-22 NOTE — PROGRESS NOTES
[facility] - Cardiology Stepdown  Neurology  Progress Note    Patient Name: Rocco France  MRN: [id number]  Admission Date: 3/22/2022  Hospital Length of Stay: 0 days  Code Status: Full Code   Attending Provider: Deana Lake MD  Primary Care Physician: Jay Guardado DO   Principal Problem:<principal problem not specified>    HPI:   Mr France is a 66M prior subdural hematoma with subarrachnoid hemorrhage (2022), CHF (nonischemic cardiomyopathy), s/p LVAD, s/p ICD, HTN, HLD, DM2. The patient presented to [facility] ED on 3/22 with complaints of double vision, dizziness and lightheadedness with onset three days ago. The patient reports that his diplopia occurs both while standing and sitting but that it is more prominent while standing. He also complains of dizziness, as though the room is spinning, which occurs when standing up or trying to get around. He reports that his symptoms wax and wane and reports no exacerbating or alleviating factors. He denies any positional relationship to his symptoms. He additionally endorsed a HA this morning (2/10 intensity) without any associated photo or phonophobia. He denied any other focal neurologic symptoms.         Overview/Hospital Course:  No notes on file      Past Medical History:   Diagnosis Date    CLARISSE (acute kidney injury) 1/11/2021    CHF (congestive heart failure)     Chronic combined systolic and diastolic congestive heart failure 1/9/2021    Coronary artery disease     Diabetes mellitus     Hypertension     Kidney stones        Past Surgical History:   Procedure Laterality Date    CARDIAC CATHETERIZATION  2010    no stents    CARDIAC DEFIBRILLATOR PLACEMENT  2010    CARDIAC DEFIBRILLATOR PLACEMENT      HERNIA REPAIR      IRRIGATION OF MEDIASTINUM N/A 1/14/2021    Procedure: IRRIGATION, MEDIASTINUM;  Surgeon: Zenon Corona MD;  Location: [facility] OR [address] [place] [address];  Service: Cardiovascular;  Laterality: N/A;    KNEE ARTHROSCOPY Right     LEFT VENTRICULAR ASSIST DEVICE  Left 1/13/2021    Procedure: INSERTION- HeartMate 3 LEFT VENTRICULAR ASSIST DEVICE ;  Surgeon: Zenon Corona MD;  Location: [facility] OR [facility];  Service: Cardiovascular;  Laterality: Left;    RECONSTRUCTION OF PERICARDIUM N/A 1/14/2021    Procedure: RECONSTRUCTION, PERICARDIUM;  Surgeon: Zenon Corona MD;  Location: [facility] OR [facility];  Service: Cardiovascular;  Laterality: N/A;    RIGHT HEART CATHETERIZATION Right 11/2/2020    Procedure: INSERTION, CATHETER, RIGHT HEART;  Surgeon: Deana Lake MD;  Location: [facility] CATH LAB;  Service: Cardiology;  Laterality: Right;    STERNAL WOUND CLOSURE  1/13/2021    Procedure: TEMPORARY CLOSURE OF CHEST;  Surgeon: Zeonn Corona MD;  Location: [facility] OR [facility];  Service: Cardiovascular;;    STERNAL WOUND CLOSURE N/A 1/14/2021    Procedure: CLOSURE, WOUND, STERNUM;  Surgeon: Zenon Corona MD;  Location: [facility] OR [facility];  Service: Cardiovascular;  Laterality: N/A;       Review of patient's allergies indicates:   Allergen Reactions    Pcn [penicillins] Hives       No current facility-administered medications on file prior to encounter.     Current Outpatient Medications on File Prior to Encounter   Medication Sig    acetaminophen (TYLENOL) 500 mg Cap Take 2 capsules (1,000 mg total) by mouth every 8 (eight) hours as needed (Pain).    atorvastatin (LIPITOR) 80 MG tablet Take 1 tablet by mouth once daily    docusate sodium (COLACE) 100 MG capsule Take 100 mg by mouth Daily.    furosemide (LASIX) 80 MG tablet Take 1 tablet (80 mg total) by mouth 2 (two) times daily.    lisinopriL (PRINIVIL,ZESTRIL) 20 MG tablet Take 1/2 tablet (10 mg total) by mouth every morning and take 1 tablet (20 mg total) every evening.    magnesium oxide (MAG-OX) 400 mg (241.3 mg magnesium) tablet Take 1 tablet (400 mg total) by mouth 3 (three) times daily.    polyethylene glycol (GLYCOLAX) 17 gram PwPk Mix 1 packet (17 g) with liquid and take by mouth daily as needed.    SITagliptin (JANUVIA) 100  MG Tab Take 1 tablet (100 mg total) by mouth once daily.    spironolactone (ALDACTONE) 25 MG tablet Take 1 tablet (25 mg total) by mouth once daily.    warfarin (COUMADIN) 5 MG tablet Take 1 tablet (5 mg total) by mouth Daily.     Family History       Problem Relation (Age of Onset)    Heart attack Mother, Brother    Heart failure Brother    Hypertension Brother          Tobacco Use    Smoking status: Current Some Day Smoker     Types: Cigarettes    Smokeless tobacco: Never Used   Substance and Sexual Activity    Alcohol use: No    Drug use: Not on file    Sexual activity: Not on file     Review of Systems   Constitutional:  Negative for chills and fever.   HENT:  Negative for rhinorrhea and sneezing.    Eyes:  Positive for visual disturbance. Negative for redness.   Respiratory:  Negative for cough and shortness of breath.    Cardiovascular:  Negative for chest pain and palpitations.   Gastrointestinal:  Negative for vomiting.   Skin:  Negative for pallor and rash.   Neurological:  Positive for dizziness, light-headedness and headaches. Negative for speech difficulty and weakness.   Psychiatric/Behavioral:  Negative for confusion and decreased concentration.    Objective:     Vital Signs (Most Recent):  Temp: 98.7 °F (37.1 °C) (03/22/22 0959)  Pulse: 104 (03/22/22 1413)  Resp: 18 (03/22/22 0959)  BP: (!) 92/0 (03/22/22 1244)  SpO2: 99 % (03/22/22 0959)   Vital Signs (24h Range):  Temp:  [98.7 °F (37.1 °C)] 98.7 °F (37.1 °C)  Pulse:  [] 104  Resp:  [18] 18  SpO2:  [99 %] 99 %  BP: (92)/(0) 92/0     Weight: 85.7 kg (189 lb)  Body mass index is 24.27 kg/m².    Physical Exam  Constitutional:       Appearance: Normal appearance.   HENT:      Head: Normocephalic and atraumatic.      Mouth/Throat:      Mouth: Mucous membranes are moist.      Pharynx: Oropharynx is clear.   Eyes:      General: No scleral icterus.     Extraocular Movements: Extraocular movements intact and EOM normal.      Conjunctiva/sclera:  Conjunctivae normal.      Pupils: Pupils are equal, round, and reactive to light.   Cardiovascular:      Rate and Rhythm: Normal rate.      Pulses: Normal pulses.   Pulmonary:      Effort: Pulmonary effort is normal. No respiratory distress.   Abdominal:      General: Abdomen is flat. There is no distension.      Tenderness: There is no abdominal tenderness.   Skin:     General: Skin is warm and dry.      Coloration: Skin is not jaundiced.   Neurological:      Mental Status: He is alert and oriented to person, place, and time.      Cranial Nerves: No cranial nerve deficit.      Sensory: No sensory deficit.      Motor: No weakness.      Coordination: Coordination normal. Finger-Nose-Finger Test and Heel to Shin Test normal.      Deep Tendon Reflexes: Strength normal. Reflexes normal.      Reflex Scores:       Tricep reflexes are 2+ on the right side and 2+ on the left side.       Bicep reflexes are 2+ on the right side and 2+ on the left side.       Brachioradialis reflexes are 2+ on the right side and 2+ on the left side.       Patellar reflexes are 2+ on the right side and 2+ on the left side.       Achilles reflexes are 2+ on the right side and 2+ on the left side.  Psychiatric:         Speech: Speech normal.       NEUROLOGICAL EXAMINATION:     MENTAL STATUS   Oriented to person, place, and time.   Attention: normal. Concentration: normal.   Speech: speech is normal   Level of consciousness: alert  Knowledge: good.     CRANIAL NERVES     CN II   Visual fields full to confrontation.     CN III, IV, VI   Pupils are equal, round, and reactive to light.  Extraocular motions are normal.   CN III: no CN III palsy  CN VI: no CN VI palsy    CN V   Facial sensation intact.     CN VII   Facial expression full, symmetric.     CN VIII   CN VIII normal.     CN IX, X   CN IX normal.   CN X normal.     CN XI   CN XI normal.     CN XII   CN XII normal.     MOTOR EXAM   Muscle bulk: normal  Overall muscle tone: normal  Right arm  tone: normal  Left arm tone: normal  Right leg tone: normal  Left leg tone: normal    Strength   Strength 5/5 throughout.     REFLEXES     Reflexes   Right brachioradialis: 2+  Left brachioradialis: 2+  Right biceps: 2+  Left biceps: 2+  Right triceps: 2+  Left triceps: 2+  Right patellar: 2+  Left patellar: 2+  Right achilles: 2+  Left achilles: 2+  Right : 2+  Left : 2+  Right Bolaños: absent  Left Bolaños: absent  Right ankle clonus: absent  Left ankle clonus: absent    SENSORY EXAM   Light touch normal.     GAIT AND COORDINATION      Coordination   Finger to nose coordination: normal  Heel to shin coordination: normal    Tremor   Resting tremor: absent  Intention tremor: absent    Significant Labs: All pertinent lab results from the past 24 hours have been reviewed.    Significant Imaging: I have reviewed all pertinent imaging results/findings within the past 24 hours.    Assessment and Plan:     Dizziness  Mr France is a 66M prior subdural hematoma with subarrachnoid hemorrhage (2022), CHF (nonischemic cardiomyopathy), s/p LVAD, s/p ICD, HTN, HLD, DM2. The [facility]/22 with complaints of double vision, dizziness and lightheadedness with onset three days ago. The patient reports that his diplopia occurs both while standing and sitting but that it is more prominent while standing. He also complains of dizziness, as though the room is spinning, which occurs when standing up or trying to get around. He reports that his symptoms wax and wane and reports no exacerbating or alleviating factors. He denies any positional relationship to his symptoms. He additionally endorsed a HA this morning (2/10 intensity) without any associated photo or phonophobia. He denied any other focal neurologic symptoms.     The patients symptoms of diplopia and vertigo have been ongoing for the past 3 days. The patient believes it is more frequent and worst when first standing. He, however, still reports some  diplopia while sitting - although vague. Given his clinical history, the patient would benefit from further workup of intracranial etiologies of his symptoms including stroke, infection and inflammation of cranial nerves resulting in diploia and dizziness; as well as further workup of a possible orthostatic etiology of his symptoms.     Recommend MRI brain with and without contrast  Recommend checking orthostatic vitals to r/o orthostatic hypotension  Will continue to follow   Please contact with any questions or concerns    Diplopia  See above        VTE Risk Mitigation (From admission, onward)    None          Danny Guzmán MD  Neurology  [facility] - Cardiology Stepdown

## 2022-03-22 NOTE — H&P
"[facility] - Cardiology Stepdown  Heart Transplant  H&P    Patient Name: Rocco France  MRN: [id number]  Admission Date: 3/22/2022  Attending Physician: Deana Lake MD  Primary Care Provider: Jay Guardado DO  Principal Problem:<principal problem not specified>    Subjective:     History of Present Illness:  67 yo BM with stage D CHF due to NICMP s/p HM3, s/p ICD, HTN, HLD, T2DM, h/o subdural/subarachnoid hemorrhage presents to ED with 3 days of dizzness and double vision. Reports feeling "woozy" and lightheaded since Sunday morning. He has also noticed double vision. He also reprots equilibrium issues while walking. His symptoms have been stable since they started. Dizziness is on and off and exacerbates on standing up. He denies any chest pain, falls, increasing SOB or LOC. He does feel his weight is up by "couple pounds". Yesterday he went to get bloodwork for his INR and when he got the call to discuss results, he was advised to present to ED. In the ER, he is volume up and will be brought in for IV diuresis and neurology evaluation.       Past Medical History:   Diagnosis Date    CLARISSE (acute kidney injury) 1/11/2021    CHF (congestive heart failure)     Chronic combined systolic and diastolic congestive heart failure 1/9/2021    Coronary artery disease     Diabetes mellitus     Hypertension     Kidney stones        Past Surgical History:   Procedure Laterality Date    CARDIAC CATHETERIZATION  2010    no stents    CARDIAC DEFIBRILLATOR PLACEMENT  2010    CARDIAC DEFIBRILLATOR PLACEMENT      HERNIA REPAIR      IRRIGATION OF MEDIASTINUM N/A 1/14/2021    Procedure: IRRIGATION, MEDIASTINUM;  Surgeon: Zenon Corona MD;  Location: [facility] OR [address] [place] [address];  Service: Cardiovascular;  Laterality: N/A;    KNEE ARTHROSCOPY Right     LEFT VENTRICULAR ASSIST DEVICE Left 1/13/2021    Procedure: INSERTION- HeartMate 3 LEFT VENTRICULAR ASSIST DEVICE ;  Surgeon: Zenon Corona MD;  Location: [facility] OR [address] [place] [address];  Service: " Cardiovascular;  Laterality: Left;    RECONSTRUCTION OF PERICARDIUM N/A 1/14/2021    Procedure: RECONSTRUCTION, PERICARDIUM;  Surgeon: Zenon Corona MD;  Location: [facility] OR [address] [place] [address];  Service: Cardiovascular;  Laterality: N/A;    RIGHT HEART CATHETERIZATION Right 11/2/2020    Procedure: INSERTION, CATHETER, RIGHT HEART;  Surgeon: Deana Lake MD;  Location: [facility] CATH LAB;  Service: Cardiology;  Laterality: Right;    STERNAL WOUND CLOSURE  1/13/2021    Procedure: TEMPORARY CLOSURE OF CHEST;  Surgeon: Zenon Corona MD;  [facility];  Service: Cardiovascular;;    STERNAL WOUND CLOSURE N/A 1/14/2021    Procedure: CLOSURE, WOUND, STERNUM;  Surgeon: Zenon Corona MD;  Location: [facility] OR [address] [place] [address];  Service: Cardiovascular;  Laterality: N/A;       Review of patient's allergies indicates:   Allergen Reactions    Pcn [penicillins] Hives       Current Facility-Administered Medications   Medication    acetaminophen tablet 650 mg    furosemide injection 80 mg    hydrALAZINE injection 10 mg     Family History       Problem Relation (Age of Onset)    Heart attack Mother, Brother    Heart failure Brother    Hypertension Brother          Tobacco Use    Smoking status: Current Some Day Smoker     Types: Cigarettes    Smokeless tobacco: Never Used   Substance and Sexual Activity    Alcohol use: No    Drug use: Not on file    Sexual activity: Not on file     Review of Systems   Constitutional:  Positive for activity change and unexpected weight change. Negative for fever.   HENT:  Negative for dental problem.    Eyes:  Negative for redness.   Respiratory:  Negative for cough, chest tightness and shortness of breath.    Cardiovascular:  Negative for chest pain and palpitations.   Gastrointestinal:  Negative for abdominal pain.   Endocrine: Negative for polydipsia.   Genitourinary:  Negative for dysuria.   Musculoskeletal:  Negative for back pain.   Skin:  Negative for rash.   Allergic/Immunologic: Negative  for food allergies.   Neurological:  Positive for light-headedness. Negative for dizziness and facial asymmetry.   Hematological:  Negative for adenopathy.   Psychiatric/Behavioral:  Negative for agitation, behavioral problems and dysphoric mood.    Objective:     Vital Signs (Most Recent):  Temp: 97.1 °F (36.2 °C) (03/22/22 1617)  Pulse: 104 (03/22/22 1413)  Resp: 19 (03/22/22 1617)  BP: (!) 98/0 (03/22/22 1617)  SpO2: 99 % (03/22/22 0959)   Vital Signs (24h Range):  Temp:  [97.1 °F (36.2 °C)-98.7 °F (37.1 °C)] 97.1 °F (36.2 °C)  Pulse:  [] 104  Resp:  [18-19] 19  SpO2:  [99 %] 99 %  BP: (92-98)/(0) 98/0     Patient Vitals for the past 72 hrs (Last 3 readings):   Weight   03/22/22 1613 89 kg (196 lb 3.4 oz)   03/22/22 0959 85.7 kg (189 lb)     Body mass index is 25.19 kg/m².      Intake/Output Summary (Last 24 hours) at 3/22/2022 1651  Last data filed at 3/22/2022 1613  Gross per 24 hour   Intake 360 ml   Output 300 ml   Net 60 ml       Physical Exam  Constitutional:       Appearance: Normal appearance.   HENT:      Right Ear: Tympanic membrane normal.      Left Ear: Tympanic membrane normal.      Nose: Nose normal.   Eyes:      Extraocular Movements: Extraocular movements intact.   Cardiovascular:      Rate and Rhythm: Regular rhythm. Tachycardia present.      Comments: JVD elevated, 17 cmH2O on my exam  Pulmonary:      Effort: Pulmonary effort is normal.      Breath sounds: Normal breath sounds.   Abdominal:      Tenderness: There is no abdominal tenderness.   Musculoskeletal:         General: Normal range of motion.      Cervical back: Normal range of motion.   Skin:     General: Skin is warm.   Neurological:      General: No focal deficit present.      Mental Status: He is alert.   Psychiatric:         Mood and Affect: Mood normal.       Significant Labs:  CBC:  Recent Labs   Lab 03/22/22  1035 03/22/22  1045   WBC 8.39  --    RBC 3.39*  --    HGB 9.0*  --    HCT 29.8* 30*     --    MCV 88  --     MCH 26.5*  --    MCHC 30.2*  --      BNP:  Recent Labs   Lab 03/22/22  1035   *     CMP:  Recent Labs   Lab 03/22/22  1035   *   CALCIUM 8.7   ALBUMIN 2.5*   PROT 8.0      K 3.0*   CO2 29      BUN 18   CREATININE 1.4   ALKPHOS 173*   ALT 24   AST 36   BILITOT 2.7*      Coagulation:   Recent Labs   Lab 03/17/22  0000 03/21/22  0000 03/22/22  1035   INR 2.0 2.7 2.2*     LDH:  Recent Labs   Lab 03/22/22  1035   *     Microbiology:  Microbiology Results (last 7 days)       ** No results found for the last 168 hours. **            I have reviewed all pertinent labs within the past 24 hours.    Diagnostic Results:  I have reviewed and interpreted all pertinent imaging results/findings within the past 24 hours.    Assessment/Plan:     Dizziness  Diplopia, diziness and disequilibrium x 2 days  No LOC, syncope, chest pain, dyspnea  Recent h/o hemorraghic stroke with INR goal 1.5-2.0 now  CT head w/o contrast unremarkable for an acute pathology in the ER  Will consult neurology    LVAD (left ventricular assist device) present  Procedure: Device Interrogation Including analysis of device parameters  Current Settings: Ventricular Assist Device  Review of device function is stable  INR 2.2, goal is 1.5-2.0 per last AC visit note, hold coumadin   Volume up, JVD elevated, IVP lasix 80 TID  K 3.0, will replete aggressively and f/up K level in 6 hours    TXP LVAD INTERROGATIONS 3/22/2022 3/22/2022 3/22/2022 3/10/2022 2/17/2022 2/16/2022 2/16/2022   Type - HeartMate3 HeartMate3 - HeartMate3 HeartMate3 HeartMate3   Flow 3.4 3.9 3.9 - 4.3 4.3 4.1   Speed 4000 5000 5050 - 5050 5000 5000   PI 7.9 6.2 4.9 - 4.7 3.9 6.1   Power (Edwards) 3.4 3.4 3.3 - 3.4 3.4 3.4   LSL 4600 4600 - - 4600 4600 4600   Pulsatility - - No Pulse No Pulse No Pulse No Pulse No Pulse           Jackson Rodrigo Mario MD  Heart Transplant  [facility] - Cardiology Stepdown

## 2022-03-22 NOTE — ASSESSMENT & PLAN NOTE
- No VAD alarms. INR above his therapeutic range (1.5-2)  - He is overloaded on exam, start diuresis with furosemide 80 mg IV TID

## 2022-03-23 LAB
ALBUMIN SERPL BCP-MCNC: 2.5 G/DL (ref 3.5–5.2)
ALP SERPL-CCNC: 181 U/L (ref 55–135)
ALT SERPL W/O P-5'-P-CCNC: 28 U/L (ref 10–44)
ANION GAP SERPL CALC-SCNC: 11 MMOL/L (ref 8–16)
APTT BLDCRRT: 33.6 SEC (ref 21–32)
ASCENDING AORTA: 3.55 CM
AST SERPL-CCNC: 42 U/L (ref 10–40)
AV INDEX (PROSTH): 0.65
AV MEAN GRADIENT: 1 MMHG
AV PEAK GRADIENT: 1 MMHG
AV VALVE AREA: 3.58 CM2
AV VELOCITY RATIO: 0.71
BASOPHILS # BLD AUTO: 0.04 K/UL (ref 0–0.2)
BASOPHILS NFR BLD: 0.4 % (ref 0–1.9)
BILIRUB DIRECT SERPL-MCNC: 1.2 MG/DL (ref 0.1–0.3)
BILIRUB SERPL-MCNC: 2.7 MG/DL (ref 0.1–1)
BSA FOR ECHO PROCEDURE: 2.1 M2
BUN SERPL-MCNC: 22 MG/DL (ref 8–23)
CALCIUM SERPL-MCNC: 9.1 MG/DL (ref 8.7–10.5)
CHLORIDE SERPL-SCNC: 103 MMOL/L (ref 95–110)
CO2 SERPL-SCNC: 25 MMOL/L (ref 23–29)
CREAT SERPL-MCNC: 1.6 MG/DL (ref 0.5–1.4)
CV ECHO LV RWT: 0.23 CM
DIFFERENTIAL METHOD: ABNORMAL
DOP CALC AO PEAK VEL: 0.58 M/S
DOP CALC AO VTI: 7.73 CM
DOP CALC LVOT AREA: 5.5 CM2
DOP CALC LVOT DIAMETER: 2.64 CM
DOP CALC LVOT PEAK VEL: 0.41 M/S
DOP CALC LVOT STROKE VOLUME: 27.68 CM3
DOP CALCLVOT PEAK VEL VTI: 5.06 CM
E/E' RATIO: 11.44 M/S
ECHO LV POSTERIOR WALL: 0.81 CM (ref 0.6–1.1)
EJECTION FRACTION: 15 %
EOSINOPHIL # BLD AUTO: 0.1 K/UL (ref 0–0.5)
EOSINOPHIL NFR BLD: 1.1 % (ref 0–8)
ERYTHROCYTE [DISTWIDTH] IN BLOOD BY AUTOMATED COUNT: 15.8 % (ref 11.5–14.5)
EST. GFR  (AFRICAN AMERICAN): 51.1 ML/MIN/1.73 M^2
EST. GFR  (NON AFRICAN AMERICAN): 44.2 ML/MIN/1.73 M^2
FRACTIONAL SHORTENING: 9 % (ref 28–44)
GLUCOSE SERPL-MCNC: 119 MG/DL (ref 70–110)
HCT VFR BLD AUTO: 33.5 % (ref 40–54)
HCV AB SERPL QL IA: NEGATIVE
HGB BLD-MCNC: 9.8 G/DL (ref 14–18)
IMM GRANULOCYTES # BLD AUTO: 0.06 K/UL (ref 0–0.04)
IMM GRANULOCYTES NFR BLD AUTO: 0.6 % (ref 0–0.5)
INR PPP: 2.1 (ref 0.8–1.2)
INTERVENTRICULAR SEPTUM: 0.72 CM (ref 0.6–1.1)
LA MAJOR: 4.14 CM
LA MINOR: 5.62 CM
LA WIDTH: 4.59 CM
LDH SERPL L TO P-CCNC: 407 U/L (ref 110–260)
LEFT ATRIUM SIZE: 3.6 CM
LEFT ATRIUM VOLUME INDEX MOD: 34.4 ML/M2
LEFT ATRIUM VOLUME INDEX: 31.7 ML/M2
LEFT ATRIUM VOLUME MOD: 72.63 CM3
LEFT ATRIUM VOLUME: 66.97 CM3
LEFT INTERNAL DIMENSION IN SYSTOLE: 6.43 CM (ref 2.1–4)
LEFT VENTRICLE DIASTOLIC VOLUME INDEX: 122.07 ML/M2
LEFT VENTRICLE DIASTOLIC VOLUME: 257.56 ML
LEFT VENTRICLE MASS INDEX: 111 G/M2
LEFT VENTRICLE SYSTOLIC VOLUME INDEX: 100 ML/M2
LEFT VENTRICLE SYSTOLIC VOLUME: 210.93 ML
LEFT VENTRICULAR INTERNAL DIMENSION IN DIASTOLE: 7.03 CM (ref 3.5–6)
LEFT VENTRICULAR MASS: 233.39 G
LV LATERAL E/E' RATIO: 7.92 M/S
LV SEPTAL E/E' RATIO: 20.6 M/S
LYMPHOCYTES # BLD AUTO: 1.7 K/UL (ref 1–4.8)
LYMPHOCYTES NFR BLD: 16.8 % (ref 18–48)
MAGNESIUM SERPL-MCNC: 2 MG/DL (ref 1.6–2.6)
MCH RBC QN AUTO: 27.3 PG (ref 27–31)
MCHC RBC AUTO-ENTMCNC: 29.3 G/DL (ref 32–36)
MCV RBC AUTO: 93 FL (ref 82–98)
MONOCYTES # BLD AUTO: 1.3 K/UL (ref 0.3–1)
MONOCYTES NFR BLD: 13.1 % (ref 4–15)
MV PEAK E VEL: 1.03 M/S
NEUTROPHILS # BLD AUTO: 6.9 K/UL (ref 1.8–7.7)
NEUTROPHILS NFR BLD: 68 % (ref 38–73)
NRBC BLD-RTO: 0 /100 WBC
PISA MRMAX VEL: 0.04 M/S
PISA TR MAX VEL: 2.83 M/S
PLATELET # BLD AUTO: 191 K/UL (ref 150–450)
PMV BLD AUTO: 11.6 FL (ref 9.2–12.9)
POCT GLUCOSE: 112 MG/DL (ref 70–110)
POCT GLUCOSE: 138 MG/DL (ref 70–110)
POCT GLUCOSE: 151 MG/DL (ref 70–110)
POTASSIUM SERPL-SCNC: 4.9 MMOL/L (ref 3.5–5.1)
PREALB SERPL-MCNC: 6 MG/DL (ref 20–43)
PROT SERPL-MCNC: 7.9 G/DL (ref 6–8.4)
PROTHROMBIN TIME: 20.9 SEC (ref 9–12.5)
RA MAJOR: 4.86 CM
RA PRESSURE: 3 MMHG
RA WIDTH: 5.75 CM
RBC # BLD AUTO: 3.59 M/UL (ref 4.6–6.2)
RIGHT VENTRICULAR END-DIASTOLIC DIMENSION: 5.45 CM
RV TISSUE DOPPLER FREE WALL SYSTOLIC VELOCITY 1 (APICAL 4 CHAMBER VIEW): 8.46 CM/S
SINUS: 3.68 CM
SODIUM SERPL-SCNC: 139 MMOL/L (ref 136–145)
STJ: 3.27 CM
TDI LATERAL: 0.13 M/S
TDI SEPTAL: 0.05 M/S
TDI: 0.09 M/S
TR MAX PG: 32 MMHG
TRICUSPID ANNULAR PLANE SYSTOLIC EXCURSION: 0.83 CM
TV REST PULMONARY ARTERY PRESSURE: 35 MMHG
WBC # BLD AUTO: 10.11 K/UL (ref 3.9–12.7)

## 2022-03-23 PROCEDURE — 85610 PROTHROMBIN TIME: CPT | Performed by: INTERNAL MEDICINE

## 2022-03-23 PROCEDURE — 83519 RIA NONANTIBODY: CPT | Mod: 59 | Performed by: STUDENT IN AN ORGANIZED HEALTH CARE EDUCATION/TRAINING PROGRAM

## 2022-03-23 PROCEDURE — 99223 1ST HOSP IP/OBS HIGH 75: CPT | Mod: AI,,, | Performed by: INTERNAL MEDICINE

## 2022-03-23 PROCEDURE — 85730 THROMBOPLASTIN TIME PARTIAL: CPT | Performed by: INTERNAL MEDICINE

## 2022-03-23 PROCEDURE — 83735 ASSAY OF MAGNESIUM: CPT | Performed by: INTERNAL MEDICINE

## 2022-03-23 PROCEDURE — 63600175 PHARM REV CODE 636 W HCPCS: Performed by: STUDENT IN AN ORGANIZED HEALTH CARE EDUCATION/TRAINING PROGRAM

## 2022-03-23 PROCEDURE — 99233 SBSQ HOSP IP/OBS HIGH 50: CPT | Mod: ,,, | Performed by: PSYCHIATRY & NEUROLOGY

## 2022-03-23 PROCEDURE — 93750 PR INTERROGATE VENT ASSIST DEV, IN PERSON, W PHYSICIAN ANALYSIS: ICD-10-PCS | Mod: ,,, | Performed by: INTERNAL MEDICINE

## 2022-03-23 PROCEDURE — 99223 PR INITIAL HOSPITAL CARE,LEVL III: ICD-10-PCS | Mod: AI,,, | Performed by: INTERNAL MEDICINE

## 2022-03-23 PROCEDURE — 83519 RIA NONANTIBODY: CPT | Performed by: STUDENT IN AN ORGANIZED HEALTH CARE EDUCATION/TRAINING PROGRAM

## 2022-03-23 PROCEDURE — 25000003 PHARM REV CODE 250: Performed by: STUDENT IN AN ORGANIZED HEALTH CARE EDUCATION/TRAINING PROGRAM

## 2022-03-23 PROCEDURE — 93750 INTERROGATION VAD IN PERSON: CPT | Mod: ,,, | Performed by: INTERNAL MEDICINE

## 2022-03-23 PROCEDURE — 85025 COMPLETE CBC W/AUTO DIFF WBC: CPT | Performed by: INTERNAL MEDICINE

## 2022-03-23 PROCEDURE — 27000248 HC VAD-ADDITIONAL DAY

## 2022-03-23 PROCEDURE — 80076 HEPATIC FUNCTION PANEL: CPT | Performed by: INTERNAL MEDICINE

## 2022-03-23 PROCEDURE — 20600001 HC STEP DOWN PRIVATE ROOM

## 2022-03-23 PROCEDURE — 36415 COLL VENOUS BLD VENIPUNCTURE: CPT | Performed by: STUDENT IN AN ORGANIZED HEALTH CARE EDUCATION/TRAINING PROGRAM

## 2022-03-23 PROCEDURE — 63600175 PHARM REV CODE 636 W HCPCS: Performed by: INTERNAL MEDICINE

## 2022-03-23 PROCEDURE — 25000003 PHARM REV CODE 250: Performed by: INTERNAL MEDICINE

## 2022-03-23 PROCEDURE — 99233 PR SUBSEQUENT HOSPITAL CARE,LEVL III: ICD-10-PCS | Mod: ,,, | Performed by: PSYCHIATRY & NEUROLOGY

## 2022-03-23 PROCEDURE — 80048 BASIC METABOLIC PNL TOTAL CA: CPT | Performed by: INTERNAL MEDICINE

## 2022-03-23 PROCEDURE — 96376 TX/PRO/DX INJ SAME DRUG ADON: CPT

## 2022-03-23 PROCEDURE — 36415 COLL VENOUS BLD VENIPUNCTURE: CPT | Performed by: INTERNAL MEDICINE

## 2022-03-23 PROCEDURE — 84134 ASSAY OF PREALBUMIN: CPT | Performed by: INTERNAL MEDICINE

## 2022-03-23 PROCEDURE — 83615 LACTATE (LD) (LDH) ENZYME: CPT | Performed by: INTERNAL MEDICINE

## 2022-03-23 RX ORDER — DIGOXIN 0.25 MG/ML
250 INJECTION INTRAMUSCULAR; INTRAVENOUS ONCE
Status: COMPLETED | OUTPATIENT
Start: 2022-03-23 | End: 2022-03-23

## 2022-03-23 RX ORDER — AMLODIPINE BESYLATE 10 MG/1
10 TABLET ORAL NIGHTLY
Status: DISCONTINUED | OUTPATIENT
Start: 2022-03-24 | End: 2022-03-24

## 2022-03-23 RX ORDER — WARFARIN SODIUM 5 MG/1
5 TABLET ORAL ONCE
Status: COMPLETED | OUTPATIENT
Start: 2022-03-23 | End: 2022-03-23

## 2022-03-23 RX ORDER — HYDRALAZINE HYDROCHLORIDE 20 MG/ML
10 INJECTION INTRAMUSCULAR; INTRAVENOUS ONCE
Status: COMPLETED | OUTPATIENT
Start: 2022-03-23 | End: 2022-03-23

## 2022-03-23 RX ORDER — HYDRALAZINE HYDROCHLORIDE 25 MG/1
25 TABLET, FILM COATED ORAL EVERY 8 HOURS
Status: DISCONTINUED | OUTPATIENT
Start: 2022-03-23 | End: 2022-03-24

## 2022-03-23 RX ORDER — DIGOXIN 125 MCG
0.12 TABLET ORAL DAILY
Status: DISCONTINUED | OUTPATIENT
Start: 2022-03-24 | End: 2022-03-26 | Stop reason: HOSPADM

## 2022-03-23 RX ORDER — AMLODIPINE BESYLATE 5 MG/1
5 TABLET ORAL ONCE
Status: COMPLETED | OUTPATIENT
Start: 2022-03-23 | End: 2022-03-23

## 2022-03-23 RX ADMIN — HYDRALAZINE HYDROCHLORIDE 25 MG: 25 TABLET, FILM COATED ORAL at 05:03

## 2022-03-23 RX ADMIN — LISINOPRIL 20 MG: 20 TABLET ORAL at 08:03

## 2022-03-23 RX ADMIN — DOCUSATE SODIUM 100 MG: 100 CAPSULE ORAL at 05:03

## 2022-03-23 RX ADMIN — WARFARIN SODIUM 5 MG: 5 TABLET ORAL at 05:03

## 2022-03-23 RX ADMIN — AMLODIPINE BESYLATE 5 MG: 5 TABLET ORAL at 11:03

## 2022-03-23 RX ADMIN — DIGOXIN 250 MCG: 0.25 INJECTION INTRAMUSCULAR; INTRAVENOUS at 05:03

## 2022-03-23 RX ADMIN — FUROSEMIDE 80 MG: 10 INJECTION, SOLUTION INTRAVENOUS at 06:03

## 2022-03-23 RX ADMIN — HYDRALAZINE HYDROCHLORIDE 10 MG: 20 INJECTION INTRAMUSCULAR; INTRAVENOUS at 09:03

## 2022-03-23 RX ADMIN — AMLODIPINE BESYLATE 5 MG: 5 TABLET ORAL at 06:03

## 2022-03-23 RX ADMIN — DIGOXIN 250 MCG: 0.25 INJECTION INTRAMUSCULAR; INTRAVENOUS at 01:03

## 2022-03-23 RX ADMIN — HYDRALAZINE HYDROCHLORIDE 25 MG: 25 TABLET, FILM COATED ORAL at 08:03

## 2022-03-23 RX ADMIN — SPIRONOLACTONE 25 MG: 25 TABLET, FILM COATED ORAL at 08:03

## 2022-03-23 RX ADMIN — HYDRALAZINE HYDROCHLORIDE 10 MG: 20 INJECTION INTRAMUSCULAR; INTRAVENOUS at 05:03

## 2022-03-23 RX ADMIN — LISINOPRIL 10 MG: 10 TABLET ORAL at 08:03

## 2022-03-23 RX ADMIN — FUROSEMIDE 80 MG: 10 INJECTION, SOLUTION INTRAVENOUS at 01:03

## 2022-03-23 RX ADMIN — FUROSEMIDE 80 MG: 10 INJECTION, SOLUTION INTRAVENOUS at 08:03

## 2022-03-23 RX ADMIN — ATORVASTATIN CALCIUM 80 MG: 20 TABLET, FILM COATED ORAL at 08:03

## 2022-03-23 NOTE — CARE UPDATE
Afib with RVR  High blood pressure    HR 140s on telemetry with afib with RVR  EKG obtained and confirmed afib  afib is not new, had it in previous admit as well with RVR  On coumadin with goal INR 1.5-2.0 currently  No rate control meds at home   Asymptomatic    BP doppler 102 on arrival to the floor, nothing on the cuff on both arms  Gave IVP hydralazine 10x2 doses   currently /76 with MAP 90  Gave his PM BP meds earlier at this time    K 3.0 on arrival, now 3.7 with po replacement, will order another IV KCl 10 since getting atrial arhythmia  Mg 2.2    Plan  -IV Dig 500 at this time, night hospitaslit with call Dr Lake around 2 pm before giving next dose of   -IV KCl at mentioned above    D/w Dr Lake

## 2022-03-23 NOTE — PROGRESS NOTES
03/23/2022  Micah WALTER Velez Jr    Current provider:  Deana Lake MD    Device interrogation:  TXP LVAD INTERROGATIONS 3/23/2022 3/22/2022 3/22/2022 3/22/2022 3/22/2022 3/22/2022 3/22/2022   Type HeartMate3 HeartMate3 HeartMate3 - - HeartMate3 HeartMate3   Flow 4.1 4.2 4.4 4.3 3.4 3.9 3.9   Speed 5000 5000 5000 5000 4000 5000 5050   PI 5.6 5.5 3.8 5.0 7.9 6.2 4.9   Power (Edwards) 3.4 3.4 3.3 3.4 3.4 3.4 3.3   LSL - - - 4600 4600 4600 -   Pulsatility - Intermittent pulse Intermittent pulse No Pulse - - No Pulse          Rounded on Rocco France to ensure all mechanical assist device settings (IABP or VAD) were appropriate and all parameters were within limits.  I was able to ensure all back up equipment was present, the staff had no issues, and the Perfusion Department daily rounding was complete.      For implantable VADs: Interrogation of Ventricular assist device was performed with analysis of device parameters and review of device function. I have personally reviewed the interrogation findings and agree with findings as stated.     In emergency, the nursing units have been notified to contact the perfusion department either by:  Calling [phone] from 630am to 4pm Mon thru Fri, utilizing the On-Call Finder functionality of Epic and searching for Perfusion, or by contacting the hospital  from 4pm to 630am and on weekends and asking to speak with the perfusionist on call.    11:46 AM

## 2022-03-23 NOTE — PROGRESS NOTES
03/23/22 1400   Device   Type HeartMate3   Flow 4.0   Speed 5100   PI 5.5   Power (Edwards) 3.5   LSL 4700       Entered room to change LVAD speed, but parameters had been changed by MD already per pt. Parameters verified with NATHANAEL Potts.

## 2022-03-23 NOTE — PLAN OF CARE
Pt cooperative with routine care and procedures. Pt AAOx4. Pt Independent/Stand by due to dizziness. Pt urinal within reach. Pt remained free from s/s of distress, falls, injury, pain, and SOB. Dr. Duenas updated throughout the night on the pt's elevated BP/HR.     Problem: Adult Inpatient Plan of Care  Goal: Plan of Care Review  Outcome: Ongoing, Progressing  Flowsheets (Taken 3/23/2022 0737)  Plan of Care Reviewed With: patient  Goal: Patient-Specific Goal (Individualized)  Outcome: Ongoing, Progressing  Flowsheets (Taken 3/23/2022 0737)  Anxieties, Fears or Concerns: Anxieties about being at the hospital  Individualized Care Needs: Monitoring his BP/HR/Dizziness  Patient-Specific Goals (Include Timeframe): To remaine free form falls, injury and stroke before dischrge  Goal: Absence of Hospital-Acquired Illness or Injury  Outcome: Ongoing, Progressing  Goal: Optimal Comfort and Wellbeing  Outcome: Ongoing, Progressing  Goal: Readiness for Transition of Care  Outcome: Ongoing, Progressing     Problem: Adjustment to Device (Ventricular Assist Device)  Goal: Optimal Adjustment to Device  Outcome: Ongoing, Progressing     Problem: Bleeding (Ventricular Assist Device)  Goal: Absence of Bleeding  Outcome: Ongoing, Progressing     Problem: Embolism (Ventricular Assist Device)  Goal: Absence of Embolism Signs and Symptoms  Outcome: Ongoing, Progressing     Problem: Infection (Ventricular Assist Device)  Goal: Absence of Infection Signs and Symptoms  Outcome: Ongoing, Progressing     Problem: Right-Sided Heart Compromise (Ventricular Assist Device)  Goal: Effective Right-Sided Heart Function  Outcome: Ongoing, Progressing     Problem: Diabetes Comorbidity  Goal: Blood Glucose Level Within Targeted Range  Outcome: Ongoing, Progressing     Problem: Fall Injury Risk  Goal: Absence of Fall and Fall-Related Injury  Outcome: Ongoing, Progressing

## 2022-03-23 NOTE — SUBJECTIVE & OBJECTIVE
Interval History:   -doing well subjectively with no complaints  -upped VAD speed to 5100 today from 5000 LV dilation and MR on past echo, repeat echo today after speed change shows LVIDD 7.03 and MR now mild  -continue IVP lasix 80 TID, Cr worsened slightly, will obtain RHC  -still in RVR of Afib, finished loading of 1000 mcg IV Dig    Continuous Infusions:  Scheduled Meds:   [START ON 3/24/2022] amLODIPine  10 mg Oral QHS    atorvastatin  80 mg Oral Daily    [START ON 3/24/2022] digoxin  0.125 mg Oral Daily    docusate sodium  100 mg Oral Daily    furosemide (LASIX) injection  80 mg Intravenous Q8H    hydrALAZINE  25 mg Oral Q8H    lisinopriL  10 mg Oral Daily    lisinopriL  20 mg Oral QHS    spironolactone  25 mg Oral Daily     PRN Meds:acetaminophen, dextrose 10%, dextrose 10%, glucagon (human recombinant), glucose, glucose, polyethylene glycol    Review of patient's allergies indicates:   Allergen Reactions    Pcn [penicillins] Hives     Objective:     Vital Signs (Most Recent):  Temp: 97.7 °F (36.5 °C) (03/23/22 1645)  Pulse: (!) 117 (03/23/22 1645)  Resp: 17 (03/23/22 1645)  BP: 115/75 (03/23/22 1820)  SpO2: 100 % (03/23/22 1645)   Vital Signs (24h Range):  Temp:  [97.7 °F (36.5 °C)-98.2 °F (36.8 °C)] 97.7 °F (36.5 °C)  Pulse:  [] 117  Resp:  [16-18] 17  SpO2:  [97 %-100 %] 100 %  BP: ()/(0-99) 115/75     Patient Vitals for the past 72 hrs (Last 3 readings):   Weight   03/23/22 1605 84.8 kg (187 lb)   03/23/22 0614 85.2 kg (187 lb 13.3 oz)   03/22/22 1613 89 kg (196 lb 3.4 oz)     Body mass index is 24.01 kg/m².      Intake/Output Summary (Last 24 hours) at 3/23/2022 1841  Last data filed at 3/23/2022 1800  Gross per 24 hour   Intake 822 ml   Output 2225 ml   Net -1403 ml       Hemodynamic Parameters:       Telemetry: afib with rvr    Physical Exam  Constitutional:       Appearance: Normal appearance.   HENT:      Right Ear: Tympanic membrane normal.      Left Ear: Tympanic membrane normal.       Nose: Nose normal.   Eyes:      Extraocular Movements: Extraocular movements intact.   Cardiovascular:      Rate and Rhythm: Regular rhythm. Tachycardia present.      Comments: JVD elevated  Pulmonary:      Effort: Pulmonary effort is normal.      Breath sounds: Normal breath sounds.   Abdominal:      Tenderness: There is no abdominal tenderness.   Musculoskeletal:         General: Normal range of motion.      Cervical back: Normal range of motion.   Skin:     General: Skin is warm.   Neurological:      General: No focal deficit present.      Mental Status: He is alert.   Psychiatric:         Mood and Affect: Mood normal.   Significant Labs:  CBC:  Recent Labs   Lab 03/22/22  1035 03/22/22  1045 03/23/22  0300   WBC 8.39  --  10.11   RBC 3.39*  --  3.59*   HGB 9.0*  --  9.8*   HCT 29.8* 30* 33.5*     --  191   MCV 88  --  93   MCH 26.5*  --  27.3   MCHC 30.2*  --  29.3*     BNP:  Recent Labs   Lab 03/22/22  1035   *     CMP:  Recent Labs   Lab 03/22/22  1035 03/22/22  1751 03/22/22  2127 03/23/22  0300   *  --   --  119*   CALCIUM 8.7  --   --  9.1   ALBUMIN 2.5*  --   --  2.5*   PROT 8.0  --   --  7.9     --   --  139   K 3.0* 3.7 4.9 4.9   CO2 29  --   --  25     --   --  103   BUN 18  --   --  22   CREATININE 1.4  --   --  1.6*   ALKPHOS 173*  --   --  181*   ALT 24  --   --  28   AST 36  --   --  42*   BILITOT 2.7*  --   --  2.7*      Coagulation:   Recent Labs   Lab 03/21/22  0000 03/22/22  1035 03/23/22  0300   INR 2.7 2.2* 2.1*   APTT  --   --  33.6*     LDH:  Recent Labs   Lab 03/22/22  1035 03/23/22  0300   * 407*     Microbiology:  Microbiology Results (last 7 days)       ** No results found for the last 168 hours. **            I have reviewed all pertinent labs within the past 24 hours.    Estimated Creatinine Clearance: 52.8 mL/min (A) (based on SCr of 1.6 mg/dL (H)).    Diagnostic Results:  I have reviewed all pertinent imaging results/findings within the past 24  hours.

## 2022-03-23 NOTE — ASSESSMENT & PLAN NOTE
Procedure: Device Interrogation Including analysis of device parameters  Current Settings: Ventricular Assist Device  Review of device function is stable  INR 2.1, goal to be 2-3 with h/o afib (though he had SAH, traumatic in 1/2022)  Volume up, JVD elevated, IVP lasix 80 TID    TXP LVAD INTERROGATIONS 3/23/2022 3/23/2022 3/23/2022 3/23/2022 3/23/2022 3/22/2022 3/22/2022   Type HeartMate3 HeartMate3 HeartMate3 HeartMate3 HeartMate3 HeartMate3 HeartMate3   Flow 4.1 4.0 4.5 4.5 4.1 4.2 4.4   Speed 5100 5100 5000 5000 5000 5000 5000   PI 5.6 5.5 5.4 5.5 5.6 5.5 3.8   Power (Edwards) 3.6 3.5 3.3 3.5 3.4 3.4 3.3   [facility] - 6328 - [phone] - - -   Pulsatility - - - Pulse - Intermittent pulse Intermittent pulse

## 2022-03-23 NOTE — PROGRESS NOTES
03/23/22 0930 03/23/22 0950   Vital Signs   /77 117/76   MAP (mmHg) 91 93   BP Location Left arm Left arm   BP Method Automatic Automatic   Patient Position Lying Lying       Notified Dr Mario that MAP remains elevated despite IV hydralazine. MD to adjust PO regimen.

## 2022-03-23 NOTE — PROGRESS NOTES
[facility] - Cardiology Stepdown  Heart Transplant  Progress Note    Patient Name: Rocco France  MRN: [id number]  Admission Date: 3/22/2022  Hospital Length of Stay: 0 days  Attending Physician: Deana Lake MD  Primary Care Provider: Jay Guardado DO  Principal Problem:<principal problem not specified>    Subjective:     Interval History:   -doing well subjectively with no complaints  -upped VAD speed to 5100 today from 5000 LV dilation and MR on past echo, repeat echo today after speed change shows LVIDD 7.03 and MR now mild  -continue IVP lasix 80 TID, Cr worsened slightly, will obtain RHC  -still in RVR of Afib, finished loading of 1000 mcg IV Dig    Continuous Infusions:  Scheduled Meds:   [START ON 3/24/2022] amLODIPine  10 mg Oral QHS    atorvastatin  80 mg Oral Daily    [START ON 3/24/2022] digoxin  0.125 mg Oral Daily    docusate sodium  100 mg Oral Daily    furosemide (LASIX) injection  80 mg Intravenous Q8H    hydrALAZINE  25 mg Oral Q8H    lisinopriL  10 mg Oral Daily    lisinopriL  20 mg Oral QHS    spironolactone  25 mg Oral Daily     PRN Meds:acetaminophen, dextrose 10%, dextrose 10%, glucagon (human recombinant), glucose, glucose, polyethylene glycol    Review of patient's allergies indicates:   Allergen Reactions    Pcn [penicillins] Hives     Objective:     Vital Signs (Most Recent):  Temp: 97.7 °F (36.5 °C) (03/23/22 1645)  Pulse: (!) 117 (03/23/22 1645)  Resp: 17 (03/23/22 1645)  BP: 115/75 (03/23/22 1820)  SpO2: 100 % (03/23/22 1645)   Vital Signs (24h Range):  Temp:  [97.7 °F (36.5 °C)-98.2 °F (36.8 °C)] 97.7 °F (36.5 °C)  Pulse:  [] 117  Resp:  [16-18] 17  SpO2:  [97 %-100 %] 100 %  BP: ()/(0-99) 115/75     Patient Vitals for the past 72 hrs (Last 3 readings):   Weight   03/23/22 1605 84.8 kg (187 lb)   03/23/22 0614 85.2 kg (187 lb 13.3 oz)   03/22/22 1613 89 kg (196 lb 3.4 oz)     Body mass index is 24.01 kg/m².      Intake/Output Summary (Last 24 hours) at  3/23/2022 1841  Last data filed at 3/23/2022 1800  Gross per 24 hour   Intake 822 ml   Output 2225 ml   Net -1403 ml       Hemodynamic Parameters:       Telemetry: afib with rvr    Physical Exam  Constitutional:       Appearance: Normal appearance.   HENT:      Right Ear: Tympanic membrane normal.      Left Ear: Tympanic membrane normal.      Nose: Nose normal.   Eyes:      Extraocular Movements: Extraocular movements intact.   Cardiovascular:      Rate and Rhythm: Regular rhythm. Tachycardia present.      Comments: JVD elevated  Pulmonary:      Effort: Pulmonary effort is normal.      Breath sounds: Normal breath sounds.   Abdominal:      Tenderness: There is no abdominal tenderness.   Musculoskeletal:         General: Normal range of motion.      Cervical back: Normal range of motion.   Skin:     General: Skin is warm.   Neurological:      General: No focal deficit present.      Mental Status: He is alert.   Psychiatric:         Mood and Affect: Mood normal.   Significant Labs:  CBC:  Recent Labs   Lab 03/22/22  1035 03/22/22  1045 03/23/22  0300   WBC 8.39  --  10.11   RBC 3.39*  --  3.59*   HGB 9.0*  --  9.8*   HCT 29.8* 30* 33.5*     --  191   MCV 88  --  93   MCH 26.5*  --  27.3   MCHC 30.2*  --  29.3*     BNP:  Recent Labs   Lab 03/22/22  1035   *     CMP:  Recent Labs   Lab 03/22/22  1035 03/22/22  1751 03/22/22  2127 03/23/22  0300   *  --   --  119*   CALCIUM 8.7  --   --  9.1   ALBUMIN 2.5*  --   --  2.5*   PROT 8.0  --   --  7.9     --   --  139   K 3.0* 3.7 4.9 4.9   CO2 29  --   --  25     --   --  103   BUN 18  --   --  22   CREATININE 1.4  --   --  1.6*   ALKPHOS 173*  --   --  181*   ALT 24  --   --  28   AST 36  --   --  42*   BILITOT 2.7*  --   --  2.7*      Coagulation:   Recent Labs   Lab 03/21/22  0000 03/22/22  1035 03/23/22  0300   INR 2.7 2.2* 2.1*   APTT  --   --  33.6*     LDH:  Recent Labs   Lab 03/22/22  1035 03/23/22  0300   * 407*  "    Microbiology:  Microbiology Results (last 7 days)       ** No results found for the last 168 hours. **            I have reviewed all pertinent labs within the past 24 hours.    Estimated Creatinine Clearance: 52.8 mL/min (A) (based on SCr of 1.6 mg/dL (H)).    Diagnostic Results:  I have reviewed all pertinent imaging results/findings within the past 24 hours.    Assessment and Plan:     65 yo BM with stage D CHF due to NICMP s/p HM3, s/p ICD, HTN, HLD, T2DM, h/o subdural/subarachnoid hemorrhage presents to ED with 3 days of dizzness and double vision. Reports feeling "woozy" and lightheaded since Sunday morning. He has also noticed double vision. He also reprots equilibrium issues while walking. His symptoms have been stable since they started. Dizziness is on and off and exacerbates on standing up. He denies any chest pain, falls, increasing SOB or LOC. He does feel his weight is up by "couple pounds". Yesterday he went to get bloodwork for his INR and when he got the call to discuss results, he was advised to present to ED. In the ER, he is volume up and will be brought in for IV diuresis and neurology evaluation.       Dizziness  Diplopia, diziness and disequilibrium x 2 days  No LOC, syncope, chest pain, dyspnea  CT head w/o contrast unremarkable for an acute pathology in the ER  Consulted neurology who recd'd ophthalmology eval    LVAD (left ventricular assist device) present  Procedure: Device Interrogation Including analysis of device parameters  Current Settings: Ventricular Assist Device  Review of device function is stable  INR 2.1, goal to be 2-3 with h/o afib (though he had SAH, traumatic in 1/2022)  Volume up, JVD elevated, IVP lasix 80 TID    TXP LVAD INTERROGATIONS 3/23/2022 3/23/2022 3/23/2022 3/23/2022 3/23/2022 3/22/2022 3/22/2022   Type HeartMate3 HeartMate3 HeartMate3 HeartMate3 HeartMate3 HeartMate3 HeartMate3   Flow 4.1 4.0 4.5 4.5 4.1 4.2 4.4   Speed 5100 5100 5000 5000 5000 5000 5000 "   PI 5.6 5.5 5.4 5.5 5.6 5.5 3.8   Power (Edwards) 3.6 3.5 3.3 3.5 3.4 3.4 3.3   [facility] 4137 - 3670 - - -   Pulsatility - - - Pulse - Intermittent pulse Intermittent pulse           Jackson Rodrigo Mario MD  Heart Transplant  [facility] - Cardiology Stepdown

## 2022-03-23 NOTE — ASSESSMENT & PLAN NOTE
Diplopia, diziness and disequilibrium x 2 days  No LOC, syncope, chest pain, dyspnea  CT head w/o contrast unremarkable for an acute pathology in the ER  Consulted neurology who recd'd ophthalmology eval

## 2022-03-23 NOTE — PROGRESS NOTES
[facility] - Cardiology Stepdown  Neurology  Progress Note    Patient Name: Rocco France  MRN: [id number]  Admission Date: 3/22/2022  Hospital Length of Stay: 0 days  Code Status: Full Code   Attending Provider: Deana Lake MD  Primary Care Physician: Jay Guardado DO   Principal Problem:<principal problem not specified>    HPI:   Mr France is a 66M prior subdural hematoma with subarrachnoid hemorrhage (2022), CHF (nonischemic cardiomyopathy), s/p LVAD, s/p ICD, HTN, HLD, DM2. The patient presented to [facility] ED on 3/22 with complaints of double vision, dizziness and lightheadedness with onset three days ago. The patient reports that his diplopia occurs both while standing and sitting but that it is more prominent while standing. He also complains of dizziness, as though the room is spinning, which occurs when standing up or trying to get around. He reports that his symptoms wax and wane and reports no exacerbating or alleviating factors. He denies any positional relationship to his symptoms. He additionally endorsed a HA this morning (2/10 intensity) without any associated photo or phonophobia. He denied any other focal neurologic symptoms.         Overview/Hospital Course:  No notes on file      Subjective:     Interval History: Patient without dizziness since admission to hospital. Intermittent diplopia. No change in exam from prior day. Some positional nature of his diplopia.     Review of Systems   Constitutional:  Negative for chills and fever.   HENT:  Negative for rhinorrhea and sneezing.    Eyes:  Positive for visual disturbance. Negative for redness.   Respiratory:  Negative for cough and shortness of breath.    Cardiovascular:  Negative for chest pain and palpitations.   Gastrointestinal:  Negative for vomiting.   Skin:  Negative for pallor and rash.   Neurological:  Positive for dizziness, light-headedness and headaches. Negative for speech difficulty and weakness.   Psychiatric/Behavioral:  Negative for  confusion and decreased concentration.    Objective:     Vital Signs (Most Recent):  Temp: 97.9 °F (36.6 °C) (03/23/22 1214)  Pulse: 107 (03/23/22 1214)  Resp: 18 (03/23/22 1214)  BP: 99/65 (03/23/22 1214)  SpO2: 99 % (03/23/22 1214)   Vital Signs (24h Range):  Temp:  [97.1 °F (36.2 °C)-98.2 °F (36.8 °C)] 97.9 °F (36.6 °C)  Pulse:  [] 107  Resp:  [16-19] 18  SpO2:  [97 %-100 %] 99 %  BP: ()/(0-88) 99/65     Weight: 85.2 kg (187 lb 13.3 oz)  Body mass index is 24.12 kg/m².    Physical Exam  Constitutional:       Appearance: Normal appearance.   HENT:      Head: Normocephalic and atraumatic.      Mouth/Throat:      Mouth: Mucous membranes are moist.      Pharynx: Oropharynx is clear.   Eyes:      General: No scleral icterus.     Extraocular Movements: Extraocular movements intact and EOM normal.      Conjunctiva/sclera: Conjunctivae normal.      Pupils: Pupils are equal, round, and reactive to light.   Cardiovascular:      Rate and Rhythm: Normal rate.      Pulses: Normal pulses.   Pulmonary:      Effort: Pulmonary effort is normal. No respiratory distress.   Abdominal:      General: Abdomen is flat. There is no distension.      Tenderness: There is no abdominal tenderness.   Skin:     General: Skin is warm and dry.      Coloration: Skin is not jaundiced.   Neurological:      Mental Status: He is alert and oriented to person, place, and time.      Cranial Nerves: No cranial nerve deficit.      Sensory: No sensory deficit.      Motor: No weakness.      Coordination: Coordination normal. Finger-Nose-Finger Test and Heel to Shin Test normal.      Deep Tendon Reflexes: Strength normal. Reflexes normal.      Reflex Scores:       Tricep reflexes are 2+ on the right side and 2+ on the left side.       Bicep reflexes are 2+ on the right side and 2+ on the left side.       Brachioradialis reflexes are 2+ on the right side and 2+ on the left side.       Patellar reflexes are 2+ on the right side and 2+ on the left  side.       Achilles reflexes are 2+ on the right side and 2+ on the left side.  Psychiatric:         Speech: Speech normal.       NEUROLOGICAL EXAMINATION:     MENTAL STATUS   Oriented to person, place, and time.   Attention: normal. Concentration: normal.   Speech: speech is normal   Level of consciousness: alert  Knowledge: good.     CRANIAL NERVES     CN II   Visual fields full to confrontation.     CN III, IV, VI   Pupils are equal, round, and reactive to light.  Extraocular motions are normal.   CN III: no CN III palsy  CN VI: no CN VI palsy    CN V   Facial sensation intact.     CN VII   Facial expression full, symmetric.     CN VIII   CN VIII normal.     CN IX, X   CN IX normal.   CN X normal.     CN XI   CN XI normal.     CN XII   CN XII normal.        Some reproducable diplopia with sudden leftward gaze     MOTOR EXAM   Muscle bulk: normal  Overall muscle tone: normal  Right arm tone: normal  Left arm tone: normal  Right leg tone: normal  Left leg tone: normal    Strength   Strength 5/5 throughout.     REFLEXES     Reflexes   Right brachioradialis: 2+  Left brachioradialis: 2+  Right biceps: 2+  Left biceps: 2+  Right triceps: 2+  Left triceps: 2+  Right patellar: 2+  Left patellar: 2+  Right achilles: 2+  Left achilles: 2+  Right : 2+  Left : 2+  Right Bolaños: absent  Left Bolaños: absent  Right ankle clonus: absent  Left ankle clonus: absent    SENSORY EXAM   Light touch normal.     GAIT AND COORDINATION      Coordination   Finger to nose coordination: normal  Heel to shin coordination: normal    Tremor   Resting tremor: absent  Intention tremor: absent    Significant Labs: All pertinent lab results from the past 24 hours have been reviewed.    Significant Imaging: I have reviewed all pertinent imaging results/findings within the past 24 hours.    Assessment and Plan:     Dizziness  Mr France is a 66M prior subdural hematoma with subarrachnoid hemorrhage (2022), CHF (nonischemic cardiomyopathy),  s/p LVAD, s/p ICD, HTN, HLD, DM2. The patient presented to [facility] ED on 3/22 with complaints of double vision, dizziness and lightheadedness with onset three days ago. The patient reports that his diplopia occurs both while standing and sitting but that it is more prominent while standing. He also complains of dizziness, as though the room is spinning, which occurs when standing up or trying to get around. He reports that his symptoms wax and wane and reports no exacerbating or alleviating factors. He denies any positional relationship to his symptoms. He additionally endorsed a HA this morning (2/10 intensity) without any associated photo or phonophobia. He denied any other focal neurologic symptoms.     The patients symptoms of diplopia and vertigo have been ongoing for the past 3 days. The patient believes it is more frequent and worst when first standing. He, however, still reports some diplopia while sitting - although vague. There seems to be some positional relationship of his diplopia on exam, although not consistent. Exam is also concerning for a possible cranial nerve pathology - possibly from a stroke of his posterior circulation which may not be visualized well on CT scan due to its limitations on the posterior fossa. Patient unable to get MRI. This etiology may also explain the patients dizziness. Some additional concern for a possible cerebellar etiology of stroke which may not be visualized well, as well. Finally, there seems to be some component of orthostatic hypotension affecting the patients dizziness. He would benefit from further cardiologic evaluation of this.      Recommend continuation of statin and warfarin (to cardiologic recommended therapeutic goal) for stroke prevention  Recommend ophthalmology consultation for detailed exam to determine if any further etiology may be ellucidated including primary ophthalmalogic or cranial nerve  Recommend MG panel, order placed. Will follow up  result  Recommend SBP goal of normotension  Will consider repeat CTH in future    Will continue to follow   Please contact with any questions or concerns    Diplopia  See above        VTE Risk Mitigation (From admission, onward)    None          Danny Guzmán MD  Neurology  [facility] - Cardiology Stepdown

## 2022-03-23 NOTE — PLAN OF CARE
/76 with MAP of 88; HR in the 130s. Received Dig 500 IV at approximately 8 PM. Will administer Dig 250 IV at this time. Discussed with staff.

## 2022-03-23 NOTE — CONSULTS
"Consultation Report  Ophthalmology Service    Date: 03/23/2022    Reason for Consult: "diplopia, recent SAH/SDH, neuro reqeusted for opththal eval"     History of Present Illness: Rocco France is a 66 y.o. male with prior subdural hematoma with subarrachnoid hemorrhage (2022), CHF (nonischemic cardiomyopathy), s/p LVAD, s/p ICD, HTN, HLD, DM2. Notes he has been having double vision that is sometimes side by side images and sometimes oblique. The images go away only when covering the left eye, but they remain double when right eye is covered.      Patient denies any other visual changes or disturbances, such as flashes, floaters, or curtain-veil in visual field, and ocular discomfort OU.    POcularHx: Denies history of ocular problems or past ocular surgeries.    Current eye gtts: Denies     Family Hx: Denies family history of glaucoma, macular degeneration, or blindness. family history includes Heart attack in his brother and mother; Heart failure in his brother; Hypertension in his brother.     PMHx:  has a past medical history of CLARISSE (acute kidney injury) (1/11/2021), CHF (congestive heart failure), Chronic combined systolic and diastolic congestive heart failure (1/9/2021), Coronary artery disease, Diabetes mellitus, Hypertension, and Kidney stones.     PSurgHx:  has a past surgical history that includes Cardiac defibrillator placement (2010); Cardiac catheterization (2010); Cardiac defibrillator placement; Knee arthroscopy (Right); Hernia repair; Right heart catheterization (Right, 11/2/2020); Left ventricular assist device (Left, 1/13/2021); Sternal wound closure (1/13/2021); Sternal wound closure (N/A, 1/14/2021); Irrigation of mediastinum (N/A, 1/14/2021); and Reconstruction of pericardium (N/A, 1/14/2021).     Home Medications:   Prior to Admission medications    Medication Sig Start Date End Date Taking? Authorizing Provider   acetaminophen (TYLENOL) 500 mg Cap Take 2 capsules (1,000 mg total) by mouth " every 8 (eight) hours as needed (Pain). 2/17/22  Yes Manuel Ramos Jr., MD   atorvastatin (LIPITOR) 80 MG tablet Take 1 tablet by mouth once daily 2/21/22  Yes Pete Baker MD   docusate sodium (COLACE) 100 MG capsule Take 100 mg by mouth Daily.   Yes Historical Provider   furosemide (LASIX) 80 MG tablet Take 1 tablet (80 mg total) by mouth 2 (two) times daily. 2/17/22 2/17/23 Yes Manuel Ramos Jr., MD   lisinopriL (PRINIVIL,ZESTRIL) 20 MG tablet Take 1/2 tablet (10 mg total) by mouth every morning and take 1 tablet (20 mg total) every evening. 11/3/21  Yes Yosvany Mckinney MD   magnesium oxide (MAG-OX) 400 mg (241.3 mg magnesium) tablet Take 1 tablet (400 mg total) by mouth 3 (three) times daily. 5/6/21  Yes Deana Lake MD   polyethylene glycol (GLYCOLAX) 17 gram PwPk Mix 1 packet (17 g) with liquid and take by mouth daily as needed. 2/4/21  Yes Sean Campbell MD   SITagliptin (JANUVIA) 100 MG Tab Take 1 tablet (100 mg total) by mouth once daily. 2/5/21  Yes Pete Baker MD   spironolactone (ALDACTONE) 25 MG tablet Take 1 tablet (25 mg total) by mouth once daily. 3/10/22 3/10/23 Yes Deana Lake MD   warfarin (COUMADIN) 5 MG tablet Take 1 tablet (5 mg total) by mouth Daily. 2/17/22  Yes Manuel Ramos Jr., MD        Medications this encounter:    [START ON 3/24/2022] amLODIPine  10 mg Oral QHS    atorvastatin  80 mg Oral Daily    [START ON 3/24/2022] digoxin  0.125 mg Oral Daily    docusate sodium  100 mg Oral Daily    furosemide (LASIX) injection  80 mg Intravenous Q8H    hydrALAZINE  25 mg Oral Q8H    lisinopriL  10 mg Oral Daily    lisinopriL  20 mg Oral QHS    spironolactone  25 mg Oral Daily       Allergies: is allergic to pcn [penicillins].     Social:  reports that he has been smoking cigarettes. He has never used smokeless tobacco. He reports that he does not drink alcohol.     ROS: As per HPI    Ocular examination/Dilated fundus examination:  Base Eye  Exam     Visual Acuity (Snellen - Linear)       Right Left    Dist sc 20/20 20/30    Dist ph sc  20/20          Tonometry (Tonopen, 4:51 PM)       Right Left    Pressure 15 17          Pupils       Pupils Dark Light Shape React APD    Right PERRL 4 2 Round Brisk None    Left PERRL 4 2 Round Brisk None          Visual Fields (Counting fingers)       Right Left     Full Full          Extraocular Movement       Right Left     Full, Ortho Full, Ortho          Dilation     Both eyes: 2% Cyclogyl @ 4:51 PM            Slit Lamp and Fundus Exam     External Exam       Right Left    External Normal Normal          Pen Light Exam       Right Left    Lids/Lashes Normal cystic lesion left lateral canthal area    Conjunctiva/Sclera White and quiet White and quiet    Cornea arcus arcus    Anterior Chamber Deep and formed Deep and formed    Iris Round and reactive Round and reactive    Lens NS NS    Anterior Vitreous Normal Normal          Fundus Exam       Right Left    Disc Normal Normal    C/D Ratio 0.2 0.2    Macula Normal Normal    Vessels Normal Normal    Periphery IT CHRPE Normal                  Assessment/Plan:     1. Monocular diplopia left eye, resolved  - patient notes he was having intermittent diplopia along with blurry vision and dizzy spells.  - patient notes it has improved and he last experienced diplopia 3/21/22 and felt he had to close his eye  - images were side by side and sometimes oblique. Double resolved when left eye covered, but it did not resolve when right eye was covered.  - EOM full, CVF full, good VA and IOP  - does not appear to have an apparent CN palsy    2. Mild DR, right eye  - denies recent eye exams  - please place outpatient referral to ophthalmology upon discharge    Tom Londono MD   [facility]  03/23/2022  4:52 PM

## 2022-03-23 NOTE — CARE UPDATE
RAPID RESPONSE NURSE ROUND       Rounding completed with charge RN, Fatmata. IV Dig administered and Potassium being replaced. No concerns verbalized at this time. Instructed to call [phone] for further concerns or assistance.

## 2022-03-23 NOTE — PROGRESS NOTES
03/23/22 0829 03/23/22 0830   Vital Signs   BP (!) 100/0 115/75   MAP (mmHg)  --  92   BP Location Left arm Left arm   BP Method Doppler Automatic   Patient Position Lying Lying       Notified Dr Mario. MD ordered dose of IV hydralazine.    Also notified MD that HR remains 120-130's. Pt asymptomatic.

## 2022-03-23 NOTE — PLAN OF CARE
Problem: Adult Inpatient Plan of Care  Goal: Patient-Specific Goal (Individualized)  Outcome: Ongoing, Progressing  Flowsheets (Taken 3/23/2022 1540)  Anxieties, Fears or Concerns: how long he is going to be here  Individualized Care Needs: monitor BP/HR/double vision/dizziness  Patient-Specific Goals (Include Timeframe): pt remain free of dizziness and double vision throughout stay     Problem: Adjustment to Device (Ventricular Assist Device)  Goal: Optimal Adjustment to Device  Outcome: Ongoing, Progressing  Intervention: Optimize Psychosocial Response to VAD  Flowsheets (Taken 3/23/2022 1540)  Supportive Measures: active listening utilized  Family/Support System Care: self-care encouraged     Problem: Embolism (Ventricular Assist Device)  Goal: Absence of Embolism Signs and Symptoms  Outcome: Ongoing, Progressing  Intervention: Prevent or Manage Embolism  Flowsheets (Taken 3/23/2022 1540)  VTE Prevention/Management: ambulation promoted       Plan of care discussed with patient.  Patient ambulating independently, fall precautions in place. Pt denying dizziness/double vision. Echo completed. Pt reports having changed dressing yesterday, so he doesn't have to change it until tomorrow. BP medications adjusted, IVP hydralazine given x1. IVP Dig given x1 for HR. HR still remains 100's-120's, occasionally increasing to 130-140's on ambulation. IVP lasix TID. LVAD DP and numbers WNL, smooth LVAD hum. Patient has no complaints of pain. Discussed medications and care.  Patient has no questions at this time. Will continue to monitor.

## 2022-03-23 NOTE — ASSESSMENT & PLAN NOTE
Mr Román is a 66M prior subdural hematoma with subarrachnoid hemorrhage (2022), CHF (nonischemic cardiomyopathy), s/p LVAD, s/p ICD, HTN, HLD, DM2. The patient presented to [facility] ED on 3/22 with complaints of double vision, dizziness and lightheadedness with onset three days ago. The patient reports that his diplopia occurs both while standing and sitting but that it is more prominent while standing. He also complains of dizziness, as though the room is spinning, which occurs when standing up or trying to get around. He reports that his symptoms wax and wane and reports no exacerbating or alleviating factors. He denies any positional relationship to his symptoms. He additionally endorsed a HA this morning (2/10 intensity) without any associated photo or phonophobia. He denied any other focal neurologic symptoms.     The patients symptoms of diplopia and vertigo have been ongoing for the past 3 days. The patient believes it is more frequent and worst when first standing. He, however, still reports some diplopia while sitting - although vague. There seems to be some positional relationship of his diplopia on exam, although not consistent. Exam is also concerning for a possible cranial nerve pathology - possibly from a stroke of his posterior circulation which may not be visualized well on CT scan due to its limitations on the posterior fossa. Patient unable to get MRI. This etiology may also explain the patients dizziness. Some additional concern for a possible cerebellar etiology of stroke which may not be visualized well, as well. Finally, there seems to be some component of orthostatic hypotension affecting the patients dizziness. He would benefit from further cardiologic evaluation of this.      Recommend continuation of statin and warfarin (to cardiologic recommended therapeutic goal) for stroke prevention  Recommend ophthalmology consultation for detailed exam to determine if any further etiology may be  ellucidated including primary ophthalmalogic or cranial nerve  Recommend MG panel, order placed. Will follow up result  Recommend SBP goal of normotension  Will consider repeat CTH in future    Will continue to follow   Please contact with any questions or concerns

## 2022-03-23 NOTE — SUBJECTIVE & OBJECTIVE
Subjective:     Interval History: Patient without dizziness since admission to hospital. Intermittent diplopia. No change in exam from prior day. Some positional nature of his diplopia.     Review of Systems   Constitutional:  Negative for chills and fever.   HENT:  Negative for rhinorrhea and sneezing.    Eyes:  Positive for visual disturbance. Negative for redness.   Respiratory:  Negative for cough and shortness of breath.    Cardiovascular:  Negative for chest pain and palpitations.   Gastrointestinal:  Negative for vomiting.   Skin:  Negative for pallor and rash.   Neurological:  Positive for dizziness, light-headedness and headaches. Negative for speech difficulty and weakness.   Psychiatric/Behavioral:  Negative for confusion and decreased concentration.    Objective:     Vital Signs (Most Recent):  Temp: 97.9 °F (36.6 °C) (03/23/22 1214)  Pulse: 107 (03/23/22 1214)  Resp: 18 (03/23/22 1214)  BP: 99/65 (03/23/22 1214)  SpO2: 99 % (03/23/22 1214)   Vital Signs (24h Range):  Temp:  [97.1 °F (36.2 °C)-98.2 °F (36.8 °C)] 97.9 °F (36.6 °C)  Pulse:  [] 107  Resp:  [16-19] 18  SpO2:  [97 %-100 %] 99 %  BP: ()/(0-88) 99/65     Weight: 85.2 kg (187 lb 13.3 oz)  Body mass index is 24.12 kg/m².    Physical Exam  Constitutional:       Appearance: Normal appearance.   HENT:      Head: Normocephalic and atraumatic.      Mouth/Throat:      Mouth: Mucous membranes are moist.      Pharynx: Oropharynx is clear.   Eyes:      General: No scleral icterus.     Extraocular Movements: Extraocular movements intact and EOM normal.      Conjunctiva/sclera: Conjunctivae normal.      Pupils: Pupils are equal, round, and reactive to light.   Cardiovascular:      Rate and Rhythm: Normal rate.      Pulses: Normal pulses.   Pulmonary:      Effort: Pulmonary effort is normal. No respiratory distress.   Abdominal:      General: Abdomen is flat. There is no distension.      Tenderness: There is no abdominal tenderness.   Skin:      General: Skin is warm and dry.      Coloration: Skin is not jaundiced.   Neurological:      Mental Status: He is alert and oriented to person, place, and time.      Cranial Nerves: No cranial nerve deficit.      Sensory: No sensory deficit.      Motor: No weakness.      Coordination: Coordination normal. Finger-Nose-Finger Test and Heel to Shin Test normal.      Deep Tendon Reflexes: Strength normal. Reflexes normal.      Reflex Scores:       Tricep reflexes are 2+ on the right side and 2+ on the left side.       Bicep reflexes are 2+ on the right side and 2+ on the left side.       Brachioradialis reflexes are 2+ on the right side and 2+ on the left side.       Patellar reflexes are 2+ on the right side and 2+ on the left side.       Achilles reflexes are 2+ on the right side and 2+ on the left side.  Psychiatric:         Speech: Speech normal.       NEUROLOGICAL EXAMINATION:     MENTAL STATUS   Oriented to person, place, and time.   Attention: normal. Concentration: normal.   Speech: speech is normal   Level of consciousness: alert  Knowledge: good.     CRANIAL NERVES     CN II   Visual fields full to confrontation.     CN III, IV, VI   Pupils are equal, round, and reactive to light.  Extraocular motions are normal.   CN III: no CN III palsy  CN VI: no CN VI palsy    CN V   Facial sensation intact.     CN VII   Facial expression full, symmetric.     CN VIII   CN VIII normal.     CN IX, X   CN IX normal.   CN X normal.     CN XI   CN XI normal.     CN XII   CN XII normal.        Some reproducable diplopia with sudden leftward gaze     MOTOR EXAM   Muscle bulk: normal  Overall muscle tone: normal  Right arm tone: normal  Left arm tone: normal  Right leg tone: normal  Left leg tone: normal    Strength   Strength 5/5 throughout.     REFLEXES     Reflexes   Right brachioradialis: 2+  Left brachioradialis: 2+  Right biceps: 2+  Left biceps: 2+  Right triceps: 2+  Left triceps: 2+  Right patellar: 2+  Left patellar:  2+  Right achilles: 2+  Left achilles: 2+  Right : 2+  Left : 2+  Right Bolaños: absent  Left Bolaños: absent  Right ankle clonus: absent  Left ankle clonus: absent    SENSORY EXAM   Light touch normal.     GAIT AND COORDINATION      Coordination   Finger to nose coordination: normal  Heel to shin coordination: normal    Tremor   Resting tremor: absent  Intention tremor: absent    Significant Labs: All pertinent lab results from the past 24 hours have been reviewed.    Significant Imaging: I have reviewed all pertinent imaging results/findings within the past 24 hours.

## 2022-03-24 LAB
ANION GAP SERPL CALC-SCNC: 9 MMOL/L (ref 8–16)
APTT BLDCRRT: 33.3 SEC (ref 21–32)
BASOPHILS # BLD AUTO: 0.03 K/UL (ref 0–0.2)
BASOPHILS NFR BLD: 0.3 % (ref 0–1.9)
BUN SERPL-MCNC: 24 MG/DL (ref 8–23)
CALCIUM SERPL-MCNC: 9.3 MG/DL (ref 8.7–10.5)
CHLORIDE SERPL-SCNC: 103 MMOL/L (ref 95–110)
CO2 SERPL-SCNC: 26 MMOL/L (ref 23–29)
CREAT SERPL-MCNC: 1.8 MG/DL (ref 0.5–1.4)
DIFFERENTIAL METHOD: ABNORMAL
EOSINOPHIL # BLD AUTO: 0.1 K/UL (ref 0–0.5)
EOSINOPHIL NFR BLD: 1.1 % (ref 0–8)
ERYTHROCYTE [DISTWIDTH] IN BLOOD BY AUTOMATED COUNT: 15.6 % (ref 11.5–14.5)
EST. GFR  (AFRICAN AMERICAN): 44.3 ML/MIN/1.73 M^2
EST. GFR  (NON AFRICAN AMERICAN): 38.4 ML/MIN/1.73 M^2
GLUCOSE SERPL-MCNC: 92 MG/DL (ref 70–110)
HCT VFR BLD AUTO: 32.3 % (ref 40–54)
HGB BLD-MCNC: 9.8 G/DL (ref 14–18)
IMM GRANULOCYTES # BLD AUTO: 0.03 K/UL (ref 0–0.04)
IMM GRANULOCYTES NFR BLD AUTO: 0.3 % (ref 0–0.5)
INR PPP: 1.9 (ref 0.8–1.2)
LDH SERPL L TO P-CCNC: 345 U/L (ref 110–260)
LYMPHOCYTES # BLD AUTO: 1.6 K/UL (ref 1–4.8)
LYMPHOCYTES NFR BLD: 16.5 % (ref 18–48)
MAGNESIUM SERPL-MCNC: 2.1 MG/DL (ref 1.6–2.6)
MCH RBC QN AUTO: 26.8 PG (ref 27–31)
MCHC RBC AUTO-ENTMCNC: 30.3 G/DL (ref 32–36)
MCV RBC AUTO: 88 FL (ref 82–98)
MONOCYTES # BLD AUTO: 1.1 K/UL (ref 0.3–1)
MONOCYTES NFR BLD: 11.7 % (ref 4–15)
NEUTROPHILS # BLD AUTO: 6.7 K/UL (ref 1.8–7.7)
NEUTROPHILS NFR BLD: 70.1 % (ref 38–73)
NRBC BLD-RTO: 0 /100 WBC
PLATELET # BLD AUTO: 187 K/UL (ref 150–450)
PMV BLD AUTO: 11 FL (ref 9.2–12.9)
POCT GLUCOSE: 103 MG/DL (ref 70–110)
POCT GLUCOSE: 135 MG/DL (ref 70–110)
POCT GLUCOSE: 146 MG/DL (ref 70–110)
POTASSIUM SERPL-SCNC: 4 MMOL/L (ref 3.5–5.1)
PROTHROMBIN TIME: 18.8 SEC (ref 9–12.5)
RBC # BLD AUTO: 3.66 M/UL (ref 4.6–6.2)
SODIUM SERPL-SCNC: 138 MMOL/L (ref 136–145)
WBC # BLD AUTO: 9.48 K/UL (ref 3.9–12.7)

## 2022-03-24 PROCEDURE — 83615 LACTATE (LD) (LDH) ENZYME: CPT | Performed by: INTERNAL MEDICINE

## 2022-03-24 PROCEDURE — 27000248 HC VAD-ADDITIONAL DAY

## 2022-03-24 PROCEDURE — C1751 CATH, INF, PER/CENT/MIDLINE: HCPCS | Performed by: INTERNAL MEDICINE

## 2022-03-24 PROCEDURE — 85610 PROTHROMBIN TIME: CPT | Performed by: INTERNAL MEDICINE

## 2022-03-24 PROCEDURE — 99233 PR SUBSEQUENT HOSPITAL CARE,LEVL III: ICD-10-PCS | Mod: 25,,, | Performed by: INTERNAL MEDICINE

## 2022-03-24 PROCEDURE — C1894 INTRO/SHEATH, NON-LASER: HCPCS | Performed by: INTERNAL MEDICINE

## 2022-03-24 PROCEDURE — 93750 INTERROGATION VAD IN PERSON: CPT | Mod: ,,, | Performed by: INTERNAL MEDICINE

## 2022-03-24 PROCEDURE — 63600175 PHARM REV CODE 636 W HCPCS: Performed by: INTERNAL MEDICINE

## 2022-03-24 PROCEDURE — 85025 COMPLETE CBC W/AUTO DIFF WBC: CPT | Performed by: INTERNAL MEDICINE

## 2022-03-24 PROCEDURE — 93451 PR RIGHT HEART CATH O2 SATURATION & CARDIAC OUTPUT: ICD-10-PCS | Mod: 26,,, | Performed by: INTERNAL MEDICINE

## 2022-03-24 PROCEDURE — 25000003 PHARM REV CODE 250: Performed by: INTERNAL MEDICINE

## 2022-03-24 PROCEDURE — 99233 SBSQ HOSP IP/OBS HIGH 50: CPT | Mod: 25,,, | Performed by: INTERNAL MEDICINE

## 2022-03-24 PROCEDURE — 94761 N-INVAS EAR/PLS OXIMETRY MLT: CPT

## 2022-03-24 PROCEDURE — 93451 RIGHT HEART CATH: CPT | Mod: 26,,, | Performed by: INTERNAL MEDICINE

## 2022-03-24 PROCEDURE — 93451 RIGHT HEART CATH: CPT | Performed by: INTERNAL MEDICINE

## 2022-03-24 PROCEDURE — 20600001 HC STEP DOWN PRIVATE ROOM

## 2022-03-24 PROCEDURE — 80048 BASIC METABOLIC PNL TOTAL CA: CPT | Performed by: INTERNAL MEDICINE

## 2022-03-24 PROCEDURE — 36415 COLL VENOUS BLD VENIPUNCTURE: CPT | Performed by: INTERNAL MEDICINE

## 2022-03-24 PROCEDURE — 83735 ASSAY OF MAGNESIUM: CPT | Performed by: INTERNAL MEDICINE

## 2022-03-24 PROCEDURE — 85730 THROMBOPLASTIN TIME PARTIAL: CPT | Performed by: INTERNAL MEDICINE

## 2022-03-24 PROCEDURE — 93750 PR INTERROGATE VENT ASSIST DEV, IN PERSON, W PHYSICIAN ANALYSIS: ICD-10-PCS | Mod: ,,, | Performed by: INTERNAL MEDICINE

## 2022-03-24 RX ORDER — LIDOCAINE HYDROCHLORIDE AND EPINEPHRINE 10; 10 MG/ML; UG/ML
INJECTION, SOLUTION INFILTRATION; PERINEURAL
Status: DISCONTINUED | OUTPATIENT
Start: 2022-03-24 | End: 2022-03-26 | Stop reason: HOSPADM

## 2022-03-24 RX ORDER — WARFARIN SODIUM 5 MG/1
5 TABLET ORAL
Status: DISCONTINUED | OUTPATIENT
Start: 2022-03-24 | End: 2022-03-26 | Stop reason: HOSPADM

## 2022-03-24 RX ORDER — WARFARIN 2.5 MG/1
2.5 TABLET ORAL
Status: DISCONTINUED | OUTPATIENT
Start: 2022-03-25 | End: 2022-03-26 | Stop reason: HOSPADM

## 2022-03-24 RX ORDER — AMLODIPINE BESYLATE 10 MG/1
10 TABLET ORAL NIGHTLY
Status: DISCONTINUED | OUTPATIENT
Start: 2022-03-24 | End: 2022-03-26 | Stop reason: HOSPADM

## 2022-03-24 RX ADMIN — DOCUSATE SODIUM 100 MG: 100 CAPSULE ORAL at 05:03

## 2022-03-24 RX ADMIN — LISINOPRIL 20 MG: 20 TABLET ORAL at 09:03

## 2022-03-24 RX ADMIN — WARFARIN SODIUM 5 MG: 5 TABLET ORAL at 05:03

## 2022-03-24 RX ADMIN — ATORVASTATIN CALCIUM 80 MG: 20 TABLET, FILM COATED ORAL at 09:03

## 2022-03-24 RX ADMIN — DIGOXIN 0.12 MG: 125 TABLET ORAL at 09:03

## 2022-03-24 RX ADMIN — HYDRALAZINE HYDROCHLORIDE 25 MG: 25 TABLET, FILM COATED ORAL at 05:03

## 2022-03-24 RX ADMIN — AMLODIPINE BESYLATE 10 MG: 10 TABLET ORAL at 07:03

## 2022-03-24 RX ADMIN — SPIRONOLACTONE 25 MG: 25 TABLET, FILM COATED ORAL at 09:03

## 2022-03-24 RX ADMIN — FUROSEMIDE 80 MG: 10 INJECTION, SOLUTION INTRAVENOUS at 05:03

## 2022-03-24 NOTE — PLAN OF CARE
Plan of care reviewed with patient and all questions answered, verbalizes understanding. VSS. Denies any CP, SOB, palpitations, dizziness, pain and discomfort. Bed locked and in lowest position, side rails x2, personal belongings and call light within reach. Turning/repositioning independently in bed. Patient resting quietly. No acute distress noted.

## 2022-03-24 NOTE — NURSING
"LVAD dressing change completed using sterile technique with daily lvad kit. DLES is a "1" with no drainage noted on the drain sponge. Tolerated without any complication. Driveline remains intact, no redness, or tenderness noted.     "

## 2022-03-24 NOTE — SUBJECTIVE & OBJECTIVE
Interval History:   -again dizzy this morning while in the bathroom  -neuro order MG w/u, unable to do MRI for stroke eval  -Cr going up, at 1.8 today, will obtain RHC today and stop diuresis  -BP now on the lower side, MAP 60s, will d/c hydralazine po 25 tid which was started yesterday  -afib with now rate controlled    Continuous Infusions:  Scheduled Meds:   amLODIPine  10 mg Oral QHS    atorvastatin  80 mg Oral Daily    digoxin  0.125 mg Oral Daily    docusate sodium  100 mg Oral Daily    lisinopriL  10 mg Oral Daily    lisinopriL  20 mg Oral QHS    spironolactone  25 mg Oral Daily    [START ON 3/25/2022] warfarin  2.5 mg Oral Once per day on Sun Tue Wed Fri Sat    warfarin  5 mg Oral Once per day on Mon Thu     PRN Meds:acetaminophen, dextrose 10%, dextrose 10%, glucagon (human recombinant), glucose, glucose, polyethylene glycol    Review of patient's allergies indicates:   Allergen Reactions    Pcn [penicillins] Hives     Objective:     Vital Signs (Most Recent):  Temp: 97.7 °F (36.5 °C) (03/24/22 1130)  Pulse: 67 (03/24/22 1130)  Resp: 18 (03/24/22 1130)  BP: (!) 96/58 (03/24/22 1130)  SpO2: 99 % (03/24/22 1130)   Vital Signs (24h Range):  Temp:  [97.7 °F (36.5 °C)-98.1 °F (36.7 °C)] 97.7 °F (36.5 °C)  Pulse:  [] 67  Resp:  [17-18] 18  SpO2:  [96 %-100 %] 99 %  BP: ()/(0-99) 96/58     Patient Vitals for the past 72 hrs (Last 3 readings):   Weight   03/24/22 0515 82.8 kg (182 lb 8.7 oz)   03/23/22 1605 84.8 kg (187 lb)   03/23/22 0614 85.2 kg (187 lb 13.3 oz)     Body mass index is 23.44 kg/m².      Intake/Output Summary (Last 24 hours) at 3/24/2022 1419  Last data filed at 3/24/2022 1309  Gross per 24 hour   Intake 1764 ml   Output 2165 ml   Net -401 ml       Hemodynamic Parameters:       Telemetry: afib, now rate controlled    Physical Exam  Constitutional:       Appearance: Normal appearance.   HENT:      Right Ear: Tympanic membrane normal.      Left Ear: Tympanic membrane normal.      Nose:  Nose normal.   Eyes:      Extraocular Movements: Extraocular movements intact.   Cardiovascular:      Rate and Rhythm: afib, rate controlled  Pulmonary:      Effort: Pulmonary effort is normal.      Breath sounds: Normal breath sounds.   Abdominal:      Tenderness: There is no abdominal tenderness.   Musculoskeletal:         General: Normal range of motion.      Cervical back: Normal range of motion.   Skin:     General: Skin is warm.   Neurological:      General: No focal deficit present.      Mental Status: He is alert.   Psychiatric:         Mood and Affect: Mood normal.   Significant Labs:  CBC:  Recent Labs   Lab 03/22/22  1035 03/22/22  1045 03/23/22  0300 03/24/22  0402   WBC 8.39  --  10.11 9.48   RBC 3.39*  --  3.59* 3.66*   HGB 9.0*  --  9.8* 9.8*   HCT 29.8* 30* 33.5* 32.3*     --  191 187   MCV 88  --  93 88   MCH 26.5*  --  27.3 26.8*   MCHC 30.2*  --  29.3* 30.3*     BNP:  Recent Labs   Lab 03/22/22  1035   *     CMP:  Recent Labs   Lab 03/22/22  1035 03/22/22  1751 03/22/22  2127 03/23/22  0300 03/24/22  0403   *  --   --  119* 92   CALCIUM 8.7  --   --  9.1 9.3   ALBUMIN 2.5*  --   --  2.5*  --    PROT 8.0  --   --  7.9  --      --   --  139 138   K 3.0*   < > 4.9 4.9 4.0   CO2 29  --   --  25 26     --   --  103 103   BUN 18  --   --  22 24*   CREATININE 1.4  --   --  1.6* 1.8*   ALKPHOS 173*  --   --  181*  --    ALT 24  --   --  28  --    AST 36  --   --  42*  --    BILITOT 2.7*  --   --  2.7*  --     < > = values in this interval not displayed.      Coagulation:   Recent Labs   Lab 03/22/22  1035 03/23/22  0300 03/24/22  0403   INR 2.2* 2.1* 1.9*   APTT  --  33.6* 33.3*     LDH:  Recent Labs   Lab 03/22/22  1035 03/23/22  0300 03/24/22  0841   * 407* 345*     Microbiology:  Microbiology Results (last 7 days)       ** No results found for the last 168 hours. **            I have reviewed all pertinent labs within the past 24 hours.    Estimated Creatinine  Clearance: 46.9 mL/min (A) (based on SCr of 1.8 mg/dL (H)).    Diagnostic Results:  I have reviewed all pertinent imaging results/findings within the past 24 hours.

## 2022-03-24 NOTE — NURSING
1500 Pt taken to [facility]. Pt AAOx4. Pt stable. No complaints of pain or signs of distress.              1700 Pt returned to room. Pt AAOx4. Pt stable. No complaints of pain or signs of distress. Will continue to monitor pt.

## 2022-03-24 NOTE — PROGRESS NOTES
Admit Note     Met with pt to assess needs. Patient is a 66 y.o.  male, admitted for dizziness, LVAD, neurology consult and per pt fluid overload.    Pt received his LVAD January 2021.  The pt does his own dressing changes.   Pt reports he was seen by neurology today and they reported the pt had a small stroke, pt reports concerns about same and possible future neurology issues.     Patient admitted to [facility] on 3/22/2022 .  At this time, patient presents as alert and oriented x4, good eye contact, calm,tearful and communicative.      At this time, patients caregiver is not in attendance.    Household/Family Systems     Patient resides with patient's spouse, at     [address] [place] [address].  1 hour and 45 minutes from [facility]      Support system includes spouse, siblings, adult children, adult step son and nephew.    Pt does not have dependents that are in need of being cared for.      Patients primary caregiver is self with support from spouse when indicated.    Pt's cell:   [phone]    Emergency contacts:   Meme France (spouse, HCPA, drives at home) [phone]  Yelitza Barron (relationship ?, secondary HCPA) [phone]  Desi Blanco (pt's sister, lives near pt) [phone]      During admission, patient's caregiver plans to stay at home.  Confirmed patient and patients caregivers do have access to reliable transportation.    Cognitive Status/Learning     Patient reports reading ability as 12th grade and states patient does not have difficulty with hearing or writing.   The pt reports difficulty with seeing and due to sight issues the pt is having difficulty with reading. Pt reports short term memory issues with distraction.  Pt reports additional time and support is needed to learn new information.  Pt is very spiritual.      Patient reports patient learns best by visual and one on one.      Needed: no.   Highest education level: 12th    Vocation/Disability   .  Working  for Income: no, pt receives disability.  Patient used to be a , however due to heart issues the pt became disabled in 2007.   Pt's spouse is retired.     Adherence     Patient reports a medium level of adherence to patients health care regimen. The pt reports he is able to keep up with his MD appointments and he follows his diet.  Pt reports difficulty with keeping up with his medication due to the dose being changed frequently (coumadin), forgetting to take the medication, or not being able to afford the medication (po diabetic medication).   Pt is wondering if the coumadin clinic can provide this information is writing.    Worker and pt discussed different strategies the pt can implement to remember medication compliance.        Adherence counseling and education provided. Patient verbalizes understanding.    Substance Use    Patient reports the following substance usage.    Tobacco: none, pt denies any use.  Alcohol: an occasional beer.  Illicit Drugs/Non-prescribed Medications: none, pt denies any use.  Patient states clear understanding of the potential impact of substance use.  Substance abstinence/cessation counseling, education and resources provided and reviewed.     Services Utilizing/ADLS    Infusion Service: Prior to admission, patient utilizing?   Home Health: Prior to admission, patient utilizing? Yes, Home Health 2000 [phone], fax [phone].  Pt receives HH for blood work and skilled nursing. Pt reports he was receiving PT, however this service ended.  Pt would like to continue with HH when discharged and would like to use the same company.   DME: Prior to admission, yes, cane (pt tries not to use the cane often, worried about dependence), and shower chair.   Pulmonary/Cardiac Rehab: Prior to admission, no  Dialysis:  Prior to admission, no  Transplant Specialty Pharmacy:  Prior to admission, no    Prior to admission, patient reports patient was independent  with ADLS and was driving.   Patient reports patient is not able to care for himself at this time as documented in the medical record.  Patient indictes a willingness to care for self once medically cleared to do so.    Insurance/Medications    Insured by   Payer/Plan Subscr  Sex Relation Sub. Ins. ID Effective Group Num   1. CAMILA TRINH* ELANA ACEVEDO 1955 Male Self [id number]                                   [address]      Primary Insurance (for UNOS reporting): Medicare FFS  Secondary Insurance (for UNOS reporting): none    NOTE:  Patient reports he is not able to obtain and afford medications at this time and at time of discharge.  Pt reports he can't afford his diabetic medication and does not have any of this medication at home. Pt reports this medication alone is $45.00 a month.    Living Will/Healthcare Power of     Patient states patient has a LW and/or HCPA.   provided education regarding LW and HCPA and the completion of forms.    Coping/Mental Health    Patient reports he's coping well with hospital stay.   Patient indicates mental health difficulties.    The pt reports difficulty with grief, change in cognitive issues and availability of support.   The pt reports his cousin in law and his oldest brother (96)  in January.  Pt reports they  around the anniversary of his mother's death and he misses his family.    The pt reports he's a very spiritual person and has spent his live helping others when in need.  Pt reports there can be a lack of reciprocation, however he's relying on his lola and kindness to cope.    Worker provided emotional support, active listening, education and encouragement.    Discharge Planning    At time of discharge, patient plans to return to his own home under the care of self and spouse.  Patients wife and nephew  will transport patient.  Per rounds today, expected discharge date has not been medically determined at this time. Patient and  patients caregiver  verbalize understanding and are involved in treatment planning and discharge process.    Additional Concerns    Patients caretaker denies additional needs and or concerns at this time.  Pt is being followed for needs, education, resources, information, emotional support, supportive counseling and for supportive and skilled discharge plan of care.  providing ongoing psychosocial support, education, resources and discharge planning as needed.  SW remains available.

## 2022-03-24 NOTE — SUBJECTIVE & OBJECTIVE
Subjective:     Interval History: Patient with one episode of dizziness this morning while washing face. One episode of diplopia while reading. Otherwise without either symptom.     Review of Systems   Constitutional:  Negative for chills and fever.   HENT:  Negative for rhinorrhea and sneezing.    Eyes:  Positive for visual disturbance. Negative for redness.   Respiratory:  Negative for cough and shortness of breath.    Cardiovascular:  Negative for chest pain and palpitations.   Gastrointestinal:  Negative for vomiting.   Skin:  Negative for pallor and rash.   Neurological:  Positive for dizziness, light-headedness and headaches. Negative for speech difficulty and weakness.   Psychiatric/Behavioral:  Negative for confusion and decreased concentration.    Objective:     Vital Signs (Most Recent):  Temp: 97.7 °F (36.5 °C) (03/24/22 1130)  Pulse: 108 (03/24/22 1518)  Resp: 18 (03/24/22 1130)  BP: (!) 96/58 (03/24/22 1130)  SpO2: 98 % (03/24/22 1420)   Vital Signs (24h Range):  Temp:  [97.7 °F (36.5 °C)-98.1 °F (36.7 °C)] 97.7 °F (36.5 °C)  Pulse:  [] 108  Resp:  [17-18] 18  SpO2:  [96 %-100 %] 98 %  BP: ()/(0-99) 96/58     Weight: 82.8 kg (182 lb 8.7 oz)  Body mass index is 23.44 kg/m².    Physical Exam  Constitutional:       Appearance: Normal appearance.   HENT:      Head: Normocephalic and atraumatic.      Mouth/Throat:      Mouth: Mucous membranes are moist.      Pharynx: Oropharynx is clear.   Eyes:      General: No scleral icterus.     Extraocular Movements: Extraocular movements intact and EOM normal.      Conjunctiva/sclera: Conjunctivae normal.      Pupils: Pupils are equal, round, and reactive to light.   Cardiovascular:      Rate and Rhythm: Normal rate.      Pulses: Normal pulses.   Pulmonary:      Effort: Pulmonary effort is normal. No respiratory distress.   Abdominal:      General: Abdomen is flat. There is no distension.      Tenderness: There is no abdominal tenderness.   Skin:      General: Skin is warm and dry.      Coloration: Skin is not jaundiced.   Neurological:      Mental Status: He is alert and oriented to person, place, and time.      Cranial Nerves: No cranial nerve deficit.      Sensory: No sensory deficit.      Motor: No weakness.      Coordination: Coordination normal. Finger-Nose-Finger Test and Heel to Shin Test normal.      Deep Tendon Reflexes: Strength normal. Reflexes normal.      Reflex Scores:       Tricep reflexes are 2+ on the right side and 2+ on the left side.       Bicep reflexes are 2+ on the right side and 2+ on the left side.       Brachioradialis reflexes are 2+ on the right side and 2+ on the left side.       Patellar reflexes are 2+ on the right side and 2+ on the left side.       Achilles reflexes are 2+ on the right side and 2+ on the left side.  Psychiatric:         Speech: Speech normal.       NEUROLOGICAL EXAMINATION:     MENTAL STATUS   Oriented to person, place, and time.   Attention: normal. Concentration: normal.   Speech: speech is normal   Level of consciousness: alert  Knowledge: good.     CRANIAL NERVES     CN II   Visual fields full to confrontation.     CN III, IV, VI   Pupils are equal, round, and reactive to light.  Extraocular motions are normal.   CN III: no CN III palsy  CN VI: no CN VI palsy    CN V   Facial sensation intact.     CN VII   Facial expression full, symmetric.     CN VIII   CN VIII normal.     CN IX, X   CN IX normal.   CN X normal.     CN XI   CN XI normal.     CN XII   CN XII normal.        Some reproducable diplopia with sudden leftward gaze     MOTOR EXAM   Muscle bulk: normal  Overall muscle tone: normal  Right arm tone: normal  Left arm tone: normal  Right leg tone: normal  Left leg tone: normal    Strength   Strength 5/5 throughout.     REFLEXES     Reflexes   Right brachioradialis: 2+  Left brachioradialis: 2+  Right biceps: 2+  Left biceps: 2+  Right triceps: 2+  Left triceps: 2+  Right patellar: 2+  Left patellar:  2+  Right achilles: 2+  Left achilles: 2+  Right : 2+  Left : 2+  Right Bolaños: absent  Left Bolaños: absent  Right ankle clonus: absent  Left ankle clonus: absent    SENSORY EXAM   Light touch normal.     GAIT AND COORDINATION      Coordination   Finger to nose coordination: normal  Heel to shin coordination: normal    Tremor   Resting tremor: absent  Intention tremor: absent    Significant Labs: All pertinent lab results from the past 24 hours have been reviewed.    Significant Imaging: I have reviewed all pertinent imaging results/findings within the past 24 hours.

## 2022-03-24 NOTE — PROGRESS NOTES
[facility] - Cardiology Stepdown  Neurology  Progress Note    Patient Name: Rocco France  MRN: [id number]  Admission Date: 3/22/2022  Hospital Length of Stay: 1 days  Code Status: Full Code   Attending Provider: Deana Lake MD  Primary Care Physician: Jay Guardado DO   Principal Problem:<principal problem not specified>    HPI:   Mr France is a 66M prior subdural hematoma with subarrachnoid hemorrhage (2022), CHF (nonischemic cardiomyopathy), s/p LVAD, s/p ICD, HTN, HLD, DM2. The patient presented to [facility] ED on 3/22 with complaints of double vision, dizziness and lightheadedness with onset three days ago. The patient reports that his diplopia occurs both while standing and sitting but that it is more prominent while standing. He also complains of dizziness, as though the room is spinning, which occurs when standing up or trying to get around. He reports that his symptoms wax and wane and reports no exacerbating or alleviating factors. He denies any positional relationship to his symptoms. He additionally endorsed a HA this morning (2/10 intensity) without any associated photo or phonophobia. He denied any other focal neurologic symptoms.         Overview/Hospital Course:  No notes on file      Subjective:     Interval History: Patient with one episode of dizziness this morning while washing face. One episode of diplopia while reading. Otherwise without either symptom.     Review of Systems   Constitutional:  Negative for chills and fever.   HENT:  Negative for rhinorrhea and sneezing.    Eyes:  Positive for visual disturbance. Negative for redness.   Respiratory:  Negative for cough and shortness of breath.    Cardiovascular:  Negative for chest pain and palpitations.   Gastrointestinal:  Negative for vomiting.   Skin:  Negative for pallor and rash.   Neurological:  Positive for dizziness, light-headedness and headaches. Negative for speech difficulty and weakness.   Psychiatric/Behavioral:  Negative for  confusion and decreased concentration.    Objective:     Vital Signs (Most Recent):  Temp: 97.7 °F (36.5 °C) (03/24/22 1130)  Pulse: 108 (03/24/22 1518)  Resp: 18 (03/24/22 1130)  BP: (!) 96/58 (03/24/22 1130)  SpO2: 98 % (03/24/22 1420)   Vital Signs (24h Range):  Temp:  [97.7 °F (36.5 °C)-98.1 °F (36.7 °C)] 97.7 °F (36.5 °C)  Pulse:  [] 108  Resp:  [17-18] 18  SpO2:  [96 %-100 %] 98 %  BP: ()/(0-99) 96/58     Weight: 82.8 kg (182 lb 8.7 oz)  Body mass index is 23.44 kg/m².    Physical Exam  Constitutional:       Appearance: Normal appearance.   HENT:      Head: Normocephalic and atraumatic.      Mouth/Throat:      Mouth: Mucous membranes are moist.      Pharynx: Oropharynx is clear.   Eyes:      General: No scleral icterus.     Extraocular Movements: Extraocular movements intact and EOM normal.      Conjunctiva/sclera: Conjunctivae normal.      Pupils: Pupils are equal, round, and reactive to light.   Cardiovascular:      Rate and Rhythm: Normal rate.      Pulses: Normal pulses.   Pulmonary:      Effort: Pulmonary effort is normal. No respiratory distress.   Abdominal:      General: Abdomen is flat. There is no distension.      Tenderness: There is no abdominal tenderness.   Skin:     General: Skin is warm and dry.      Coloration: Skin is not jaundiced.   Neurological:      Mental Status: He is alert and oriented to person, place, and time.      Cranial Nerves: No cranial nerve deficit.      Sensory: No sensory deficit.      Motor: No weakness.      Coordination: Coordination normal. Finger-Nose-Finger Test and Heel to Shin Test normal.      Deep Tendon Reflexes: Strength normal. Reflexes normal.      Reflex Scores:       Tricep reflexes are 2+ on the right side and 2+ on the left side.       Bicep reflexes are 2+ on the right side and 2+ on the left side.       Brachioradialis reflexes are 2+ on the right side and 2+ on the left side.       Patellar reflexes are 2+ on the right side and 2+ on the  left side.       Achilles reflexes are 2+ on the right side and 2+ on the left side.  Psychiatric:         Speech: Speech normal.       NEUROLOGICAL EXAMINATION:     MENTAL STATUS   Oriented to person, place, and time.   Attention: normal. Concentration: normal.   Speech: speech is normal   Level of consciousness: alert  Knowledge: good.     CRANIAL NERVES     CN II   Visual fields full to confrontation.     CN III, IV, VI   Pupils are equal, round, and reactive to light.  Extraocular motions are normal.   CN III: no CN III palsy  CN VI: no CN VI palsy    CN V   Facial sensation intact.     CN VII   Facial expression full, symmetric.     CN VIII   CN VIII normal.     CN IX, X   CN IX normal.   CN X normal.     CN XI   CN XI normal.     CN XII   CN XII normal.        Some reproducable diplopia with sudden leftward gaze     MOTOR EXAM   Muscle bulk: normal  Overall muscle tone: normal  Right arm tone: normal  Left arm tone: normal  Right leg tone: normal  Left leg tone: normal    Strength   Strength 5/5 throughout.     REFLEXES     Reflexes   Right brachioradialis: 2+  Left brachioradialis: 2+  Right biceps: 2+  Left biceps: 2+  Right triceps: 2+  Left triceps: 2+  Right patellar: 2+  Left patellar: 2+  Right achilles: 2+  Left achilles: 2+  Right : 2+  Left : 2+  Right Bolaños: absent  Left Bolaños: absent  Right ankle clonus: absent  Left ankle clonus: absent    SENSORY EXAM   Light touch normal.     GAIT AND COORDINATION      Coordination   Finger to nose coordination: normal  Heel to shin coordination: normal    Tremor   Resting tremor: absent  Intention tremor: absent    Significant Labs: All pertinent lab results from the past 24 hours have been reviewed.    Significant Imaging: I have reviewed all pertinent imaging results/findings within the past 24 hours.    Assessment and Plan:     Dizziness  Mr France is a 66M prior subdural hematoma with subarrachnoid hemorrhage (2022), CHF (nonischemic  cardiomyopathy), s/p LVAD, s/p ICD, HTN, HLD, DM2. The patient presented [place] [facility] on 3/22 with complaints of double vision, dizziness and lightheadedness with onset three days ago. The patient reports that his diplopia occurs both while standing and sitting but that it is more prominent while standing. He also complains of dizziness, as though the room is spinning, which occurs when standing up or trying to get around. He reports that his symptoms wax and wane and reports no exacerbating or alleviating factors. He denies any positional relationship to his symptoms. He additionally endorsed a HA this morning (2/10 intensity) without any associated photo or phonophobia. He denied any other focal neurologic symptoms.     The patients symptoms of diplopia and vertigo have been ongoing for the past 3 days. The patient believes it is more frequent and worst when first standing. He, however, still reports some diplopia while sitting - although vague. There seems to be some positional relationship of his diplopia on exam, although not consistent. Exam is also concerning for a possible cranial nerve pathology - possibly from a stroke of his posterior circulation which may not be visualized well on CT scan due to its limitations on the posterior fossa. Patient unable to get MRI. This etiology may also explain the patients dizziness. Some additional concern for a possible cerebellar etiology of stroke which may not be visualized well, as well. Finally, there seems to be some component of orthostatic hypotension affecting the patients dizziness. He would benefit from further cardiologic evaluation of this.      Recommend continuation of statin and warfarin (to cardiologic recommended therapeutic goal) for stroke prevention  Appreciate ophtho recommendations. Patient to follow up outpatient  MG panel pending  Recommend SBP goal of normotension  Patient to follow up in neurology clinic, order placed  Consider CTH in  future    Will sign off at this time  Please contact with any questions or concerns    Diplopia  See above        VTE Risk Mitigation (From admission, onward)         Ordered     warfarin (COUMADIN) tablet 2.5 mg  Once per day on Sun Tue Wed Fri Sat         03/24/22 0854     warfarin (COUMADIN) tablet 5 mg  Once per day on Mon Thu 03/24/22 0854                Danny Guzmán MD  Neurology  [facility] - [facility]

## 2022-03-24 NOTE — ASSESSMENT & PLAN NOTE
Mr Román is a 66M prior subdural hematoma with subarrachnoid hemorrhage (2022), CHF (nonischemic cardiomyopathy), s/p LVAD, s/p ICD, HTN, HLD, DM2. The patient presented to [facility] ED on 3/22 with complaints of double vision, dizziness and lightheadedness with onset three days ago. The patient reports that his diplopia occurs both while standing and sitting but that it is more prominent while standing. He also complains of dizziness, as though the room is spinning, which occurs when standing up or trying to get around. He reports that his symptoms wax and wane and reports no exacerbating or alleviating factors. He denies any positional relationship to his symptoms. He additionally endorsed a HA this morning (2/10 intensity) without any associated photo or phonophobia. He denied any other focal neurologic symptoms.     The patients symptoms of diplopia and vertigo have been ongoing for the past 3 days. The patient believes it is more frequent and worst when first standing. He, however, still reports some diplopia while sitting - although vague. There seems to be some positional relationship of his diplopia on exam, although not consistent. Exam is also concerning for a possible cranial nerve pathology - possibly from a stroke of his posterior circulation which may not be visualized well on CT scan due to its limitations on the posterior fossa. Patient unable to get MRI. This etiology may also explain the patients dizziness. Some additional concern for a possible cerebellar etiology of stroke which may not be visualized well, as well. Finally, there seems to be some component of orthostatic hypotension affecting the patients dizziness. He would benefit from further cardiologic evaluation of this.      Recommend continuation of statin and warfarin (to cardiologic recommended therapeutic goal) for stroke prevention  Appreciate ophtho recommendations. Patient to follow up outpatient  MG panel pending  Recommend SBP goal of  normotension  Patient to follow up in neurology clinic, order placed  Consider CTH in future    Will sign off at this time  Please contact with any questions or concerns

## 2022-03-24 NOTE — PLAN OF CARE
Problem: Adult Inpatient Plan of Care  Goal: Patient-Specific Goal (Individualized)  Outcome: Ongoing, Progressing  Flowsheets (Taken 3/24/2022 1150)  Anxieties, Fears or Concerns: BP  Individualized Care Needs: monitor BP and HR  Patient-Specific Goals (Include Timeframe): pt BP and HR remain in normal parameters     Problem: Adjustment to Device (Ventricular Assist Device)  Goal: Optimal Adjustment to Device  Outcome: Ongoing, Progressing  Intervention: Optimize Psychosocial Response to VAD  Flowsheets (Taken 3/24/2022 1150)  Supportive Measures: active listening utilized     Problem: Infection (Ventricular Assist Device)  Goal: Absence of Infection Signs and Symptoms  Outcome: Ongoing, Progressing  Intervention: Prevent or Manage Infection  Flowsheets (Taken 3/24/2022 1150)  Infection Prevention:   personal protective equipment utilized   hand hygiene promoted   equipment surfaces disinfected       Plan of care discussed with patient.  Patient ambulating independently, fall precautions in place. Pt denying dizziness/double vision. BP remains stable. HR 's. IVP lasix TID. LVAD DP and numbers WNL, smooth LVAD hum. Patient has no complaints of pain. Discussed medications and care.  Patient has no questions at this time. Will continue to monitor.

## 2022-03-24 NOTE — PLAN OF CARE
67 yo BM with stage D CHF due to NICMP s/p HM3, s/p ICD, HTN, HLD, T2DM, h/o subdural/subarachnoid hemorrhage presents to ED with 3 days of dizzness and double vision. In the ER, he was volume up and admitted for IV diuresis. He was diuresed and today am was feeling dizzy Cr going up, at 1.8 today, planned to get RHC today and stop diuresis    Patient seen and examined. Here for a RHC to assess his hemodynamics.   Access via the right IJ  7 Fr venous sheath  Risks and benefits of the procedure were explained to the patient. All questions were answered.  Consents were signed and in the chart.    Oralia Garcia MD    Pt care d/w Dr Ramos who agrees with the above management and plan

## 2022-03-24 NOTE — PROGRESS NOTES
[facility] - Cardiology Stepdown  Heart Transplant  Progress Note    Patient Name: Rocco France  MRN: [id number]  Admission Date: 3/22/2022  Hospital Length of Stay: 1 days  Attending Physician: Deana Lake MD  Primary Care Provider: Jay Guardado DO  Principal Problem:<principal problem not specified>    Subjective:     Interval History:   -again dizzy this morning while in the bathroom  -neuro order MG w/u, unable to do MRI for stroke eval  -Cr going up, at 1.8 today, will obtain RHC today and stop diuresis  -BP now on the lower side, MAP 60s, will d/c hydralazine po 25 tid which was started yesterday  -afib with now rate controlled    Continuous Infusions:  Scheduled Meds:   amLODIPine  10 mg Oral QHS    atorvastatin  80 mg Oral Daily    digoxin  0.125 mg Oral Daily    docusate sodium  100 mg Oral Daily    lisinopriL  10 mg Oral Daily    lisinopriL  20 mg Oral QHS    spironolactone  25 mg Oral Daily    [START ON 3/25/2022] warfarin  2.5 mg Oral Once per day on Sun Tue Wed Fri Sat    warfarin  5 mg Oral Once per day on Mon Thu     PRN Meds:acetaminophen, dextrose 10%, dextrose 10%, glucagon (human recombinant), glucose, glucose, polyethylene glycol    Review of patient's allergies indicates:   Allergen Reactions    Pcn [penicillins] Hives     Objective:     Vital Signs (Most Recent):  Temp: 97.7 °F (36.5 °C) (03/24/22 1130)  Pulse: 67 (03/24/22 1130)  Resp: 18 (03/24/22 1130)  BP: (!) 96/58 (03/24/22 1130)  SpO2: 99 % (03/24/22 1130)   Vital Signs (24h Range):  Temp:  [97.7 °F (36.5 °C)-98.1 °F (36.7 °C)] 97.7 °F (36.5 °C)  Pulse:  [] 67  Resp:  [17-18] 18  SpO2:  [96 %-100 %] 99 %  BP: ()/(0-99) 96/58     Patient Vitals for the past 72 hrs (Last 3 readings):   Weight   03/24/22 0515 82.8 kg (182 lb 8.7 oz)   03/23/22 1605 84.8 kg (187 lb)   03/23/22 0614 85.2 kg (187 lb 13.3 oz)     Body mass index is 23.44 kg/m².      Intake/Output Summary (Last 24 hours) at 3/24/2022 1419  Last data  filed at 3/24/2022 1309  Gross per 24 hour   Intake 1764 ml   Output 2165 ml   Net -401 ml       Hemodynamic Parameters:       Telemetry: afib, now rate controlled    Physical Exam  Constitutional:       Appearance: Normal appearance.   HENT:      Right Ear: Tympanic membrane normal.      Left Ear: Tympanic membrane normal.      Nose: Nose normal.   Eyes:      Extraocular Movements: Extraocular movements intact.   Cardiovascular:      Rate and Rhythm: afib, rate controlled  Pulmonary:      Effort: Pulmonary effort is normal.      Breath sounds: Normal breath sounds.   Abdominal:      Tenderness: There is no abdominal tenderness.   Musculoskeletal:         General: Normal range of motion.      Cervical back: Normal range of motion.   Skin:     General: Skin is warm.   Neurological:      General: No focal deficit present.      Mental Status: He is alert.   Psychiatric:         Mood and Affect: Mood normal.   Significant Labs:  CBC:  Recent Labs   Lab 03/22/22  1035 03/22/22  1045 03/23/22  0300 03/24/22  0402   WBC 8.39  --  10.11 9.48   RBC 3.39*  --  3.59* 3.66*   HGB 9.0*  --  9.8* 9.8*   HCT 29.8* 30* 33.5* 32.3*     --  191 187   MCV 88  --  93 88   MCH 26.5*  --  27.3 26.8*   MCHC 30.2*  --  29.3* 30.3*     BNP:  Recent Labs   Lab 03/22/22  1035   *     CMP:  Recent Labs   Lab 03/22/22  1035 03/22/22  1751 03/22/22  2127 03/23/22  0300 03/24/22  0403   *  --   --  119* 92   CALCIUM 8.7  --   --  9.1 9.3   ALBUMIN 2.5*  --   --  2.5*  --    PROT 8.0  --   --  7.9  --      --   --  139 138   K 3.0*   < > 4.9 4.9 4.0   CO2 29  --   --  25 26     --   --  103 103   BUN 18  --   --  22 24*   CREATININE 1.4  --   --  1.6* 1.8*   ALKPHOS 173*  --   --  181*  --    ALT 24  --   --  28  --    AST 36  --   --  42*  --    BILITOT 2.7*  --   --  2.7*  --     < > = values in this interval not displayed.      Coagulation:   Recent Labs   Lab 03/22/22  1035 03/23/22  0300 03/24/22  0403   INR  "2.2* 2.1* 1.9*   APTT  --  33.6* 33.3*     LDH:  Recent Labs   Lab 03/22/22  1035 03/23/22  0300 03/24/22  0841   * 407* 345*     Microbiology:  Microbiology Results (last 7 days)       ** No results found for the last 168 hours. **            I have reviewed all pertinent labs within the past 24 hours.    Estimated Creatinine Clearance: 46.9 mL/min (A) (based on SCr of 1.8 mg/dL (H)).    Diagnostic Results:  I have reviewed all pertinent imaging results/findings within the past 24 hours.    Assessment and Plan:     67 yo BM with stage D CHF due to NICMP s/p HM3, s/p ICD, HTN, HLD, T2DM, h/o subdural/subarachnoid hemorrhage presents to ED with 3 days of dizzness and double vision. Reports feeling "woozy" and lightheaded since Sunday morning. He has also noticed double vision. He also reprots equilibrium issues while walking. His symptoms have been stable since they started. Dizziness is on and off and exacerbates on standing up. He denies any chest pain, falls, increasing SOB or LOC. He does feel his weight is up by "couple pounds". Yesterday he went to get bloodwork for his INR and when he got the call to discuss results, he was advised to present to ED. In the ER, he is volume up and will be brought in for IV diuresis and neurology evaluation.       Dizziness  Diplopia, diziness and disequilibrium x 2 days prior to arrival  No LOC, syncope, chest pain, dyspnea  CT head w/o contrast unremarkable for an acute pathology in the ER  Unable to obtain MRI   Consulted neurology who recd'd ophthalmology eval, ophthalmology recd'd outpt evaluation    LVAD (left ventricular assist device) present  Procedure: Device Interrogation Including analysis of device parameters  Current Settings: Ventricular Assist Device  Review of device function is stable  INR1.9, goal to be 2-3 with h/o afib (though he had SAH, traumatic in 1/2022), coumadin  RHC today     TXP LVAD INTERROGATIONS 3/24/2022 3/24/2022 3/24/2022 3/23/2022 " 3/23/2022 3/23/2022 3/23/2022   Type HeartMate3 HeartMate3 HeartMate3 HeartMate3 HeartMate3 HeartMate3 HeartMate3   Flow 4.5 4.1 4.2 4.3 4.1 4.0 4.5   Speed 5100 5100 5100 5100 5100 5100 5000   PI 4.7 4.8 4.9 4.6 5.6 5.5 5.4   Power (Edwards) 3.5 3.6 3.6 3.6 3.6 3.5 3.3   LSL 4700 4700 4700 4700 - 4700 -   Pulsatility Pulse Pulse Pulse Pulse - - -       Diplopia  Ophthalmology evaluated the patient on 3/23 and recd'd outpatient evaluation    CLARISSE   RHC today for vol assessment   Cr 1.8   Diuresis stopped    Manuel Mario MD  Heart Transplant  [facility] - Cardiology Stepdown

## 2022-03-24 NOTE — NURSING
Pt MAP in 60's this AM and has had multiple changes in meds. Notified Dr Mario of pt's MAP. MD ordered to hold lisinopril at this time so pt's BP doesn't drop too low.

## 2022-03-24 NOTE — ASSESSMENT & PLAN NOTE
Diplopia, diziness and disequilibrium x 2 days prior to arrival  No LOC, syncope, chest pain, dyspnea  CT head w/o contrast unremarkable for an acute pathology in the ER  Unable to obtain MRI   Consulted neurology who recd'd ophthalmology eval, ophthalmology recd'd outpt evaluation

## 2022-03-24 NOTE — ASSESSMENT & PLAN NOTE
Procedure: Device Interrogation Including analysis of device parameters  Current Settings: Ventricular Assist Device  Review of device function is stable  INR1.9, goal to be 2-3 with h/o afib (though he had SAH, traumatic in 1/2022), coumadin  RHC today     TXP LVAD INTERROGATIONS 3/24/2022 3/24/2022 3/24/2022 3/23/2022 3/23/2022 3/23/2022 3/23/2022   Type HeartMate3 HeartMate3 HeartMate3 HeartMate3 HeartMate3 HeartMate3 HeartMate3   Flow 4.5 4.1 4.2 4.3 4.1 4.0 4.5   Speed 5100 5100 5100 5100 5100 5100 5000   PI 4.7 4.8 4.9 4.6 5.6 5.5 5.4   Power (Edwards) 3.5 3.6 3.6 3.6 3.6 3.5 3.3   LSL 4700 4700 4700 4700 - 4700 -   Pulsatility Pulse Pulse Pulse Pulse - - -

## 2022-03-24 NOTE — PROGRESS NOTES
Interdisciplinary Rounds Report:   Attended interdisciplinary rounds with the [facility]/CTS services including the LVAD Coordinators, social workers, cardiologists, surgeons,  PT/OT/Speech, dietician, and unit charge nurses. Discussed patient status, plan of care, goals of care, including DC date, and post discharge needs. Plan of care will be discussed with the patient and/or family per the physician while rounding on the floor. This is a recurring meeting that is medically and socially necessary to collaborate with the interdisciplinary team to assist patient needs and safe discharge.

## 2022-03-24 NOTE — PROCEDURES
[facility] Lab Post Procedure Note    Patient tolerated the procedure well.   There were no complications.   Please see full report in EPIC for details.

## 2022-03-24 NOTE — PROGRESS NOTES
03/24/2022  Micah WALTER Velez Jr    Current provider:  Deana Lake MD    Device interrogation:  TXP LVAD INTERROGATIONS 3/24/2022 3/24/2022 3/24/2022 3/23/2022 3/23/2022 3/23/2022 3/23/2022   Type HeartMate3 HeartMate3 HeartMate3 HeartMate3 HeartMate3 HeartMate3 HeartMate3   Flow 4.5 4.1 4.2 4.3 4.1 4.0 4.5   Speed 5100 5100 5100 5100 5100 5100 5000   PI 4.7 4.8 4.9 4.6 5.6 5.5 5.4   Power (Edwards) 3.5 3.6 3.6 3.6 3.6 3.5 3.3   LSL 4700 4700 4700 4700 - 4700 -   Pulsatility Pulse Pulse Pulse Pulse - - -          Rounded on Rocco France to ensure all mechanical assist device settings (IABP or VAD) were appropriate and all parameters were within limits.  I was able to ensure all back up equipment was present, the staff had no issues, and the Perfusion Department daily rounding was complete.      For implantable VADs: Interrogation of Ventricular assist device was performed with analysis of device parameters and review of device function. I have personally reviewed the interrogation findings and agree with findings as stated.     In emergency, the nursing units have been notified to contact the perfusion department either by:  Calling [phone] from 630am to 4pm Mon thru Fri, utilizing the On-Call Finder functionality of Epic and searching for Perfusion, or by contacting the hospital  from 4pm to 630am and on weekends and asking to speak with the perfusionist on call.    8:46 AM

## 2022-03-24 NOTE — ASSESSMENT & PLAN NOTE
Cr 1.5 today from 1.8 yesterday with holding diuresis  RHC obtained 3/24 after upping VAD speed by 100 on 3/23  RA: 16/ 16/ 11 RV: 30/ 10 PA: 35/ 16/ 22 PWP: 15/ 19/ 14 .   Cardiac output was 6.5  by [place]. Cardiac index is 3.1 L/min/m2.

## 2022-03-25 LAB
ALBUMIN SERPL BCP-MCNC: 2.2 G/DL (ref 3.5–5.2)
ALP SERPL-CCNC: 170 U/L (ref 55–135)
ALT SERPL W/O P-5'-P-CCNC: 48 U/L (ref 10–44)
ANION GAP SERPL CALC-SCNC: 7 MMOL/L (ref 8–16)
APTT BLDCRRT: 33.3 SEC (ref 21–32)
AST SERPL-CCNC: 57 U/L (ref 10–40)
BASOPHILS # BLD AUTO: 0.02 K/UL (ref 0–0.2)
BASOPHILS NFR BLD: 0.2 % (ref 0–1.9)
BILIRUB DIRECT SERPL-MCNC: 1.1 MG/DL (ref 0.1–0.3)
BILIRUB SERPL-MCNC: 2.9 MG/DL (ref 0.1–1)
BNP SERPL-MCNC: 915 PG/ML (ref 0–99)
BUN SERPL-MCNC: 24 MG/DL (ref 8–23)
CALCIUM SERPL-MCNC: 8.8 MG/DL (ref 8.7–10.5)
CHLORIDE SERPL-SCNC: 101 MMOL/L (ref 95–110)
CO2 SERPL-SCNC: 26 MMOL/L (ref 23–29)
CREAT SERPL-MCNC: 1.5 MG/DL (ref 0.5–1.4)
CRP SERPL-MCNC: 20.1 MG/L (ref 0–8.2)
DIFFERENTIAL METHOD: ABNORMAL
EOSINOPHIL # BLD AUTO: 0.2 K/UL (ref 0–0.5)
EOSINOPHIL NFR BLD: 1.7 % (ref 0–8)
ERYTHROCYTE [DISTWIDTH] IN BLOOD BY AUTOMATED COUNT: 15.8 % (ref 11.5–14.5)
EST. GFR  (AFRICAN AMERICAN): 55.3 ML/MIN/1.73 M^2
EST. GFR  (NON AFRICAN AMERICAN): 47.8 ML/MIN/1.73 M^2
GLUCOSE SERPL-MCNC: 96 MG/DL (ref 70–110)
HCT VFR BLD AUTO: 32 % (ref 40–54)
HGB BLD-MCNC: 9.5 G/DL (ref 14–18)
IMM GRANULOCYTES # BLD AUTO: 0.03 K/UL (ref 0–0.04)
IMM GRANULOCYTES NFR BLD AUTO: 0.3 % (ref 0–0.5)
INR PPP: 1.8 (ref 0.8–1.2)
LYMPHOCYTES # BLD AUTO: 1.9 K/UL (ref 1–4.8)
LYMPHOCYTES NFR BLD: 20.2 % (ref 18–48)
MAGNESIUM SERPL-MCNC: 2.1 MG/DL (ref 1.6–2.6)
MCH RBC QN AUTO: 27.3 PG (ref 27–31)
MCHC RBC AUTO-ENTMCNC: 29.7 G/DL (ref 32–36)
MCV RBC AUTO: 92 FL (ref 82–98)
MONOCYTES # BLD AUTO: 1.3 K/UL (ref 0.3–1)
MONOCYTES NFR BLD: 13.3 % (ref 4–15)
NEUTROPHILS # BLD AUTO: 6 K/UL (ref 1.8–7.7)
NEUTROPHILS NFR BLD: 64.3 % (ref 38–73)
NRBC BLD-RTO: 0 /100 WBC
PLATELET # BLD AUTO: 194 K/UL (ref 150–450)
PMV BLD AUTO: 11.6 FL (ref 9.2–12.9)
POCT GLUCOSE: 103 MG/DL (ref 70–110)
POCT GLUCOSE: 143 MG/DL (ref 70–110)
POCT GLUCOSE: 144 MG/DL (ref 70–110)
POCT GLUCOSE: 151 MG/DL (ref 70–110)
POCT GLUCOSE: 98 MG/DL (ref 70–110)
POTASSIUM SERPL-SCNC: 3.3 MMOL/L (ref 3.5–5.1)
PREALB SERPL-MCNC: 6 MG/DL (ref 20–43)
PROT SERPL-MCNC: 7.5 G/DL (ref 6–8.4)
PROTHROMBIN TIME: 18.4 SEC (ref 9–12.5)
RBC # BLD AUTO: 3.48 M/UL (ref 4.6–6.2)
SODIUM SERPL-SCNC: 134 MMOL/L (ref 136–145)
WBC # BLD AUTO: 9.39 K/UL (ref 3.9–12.7)

## 2022-03-25 PROCEDURE — 93750 PR INTERROGATE VENT ASSIST DEV, IN PERSON, W PHYSICIAN ANALYSIS: ICD-10-PCS | Mod: ,,, | Performed by: INTERNAL MEDICINE

## 2022-03-25 PROCEDURE — 86140 C-REACTIVE PROTEIN: CPT | Performed by: INTERNAL MEDICINE

## 2022-03-25 PROCEDURE — 85610 PROTHROMBIN TIME: CPT | Performed by: INTERNAL MEDICINE

## 2022-03-25 PROCEDURE — 97162 PT EVAL MOD COMPLEX 30 MIN: CPT

## 2022-03-25 PROCEDURE — 85730 THROMBOPLASTIN TIME PARTIAL: CPT | Performed by: INTERNAL MEDICINE

## 2022-03-25 PROCEDURE — 99233 PR SUBSEQUENT HOSPITAL CARE,LEVL III: ICD-10-PCS | Mod: ,,, | Performed by: INTERNAL MEDICINE

## 2022-03-25 PROCEDURE — 20600001 HC STEP DOWN PRIVATE ROOM

## 2022-03-25 PROCEDURE — 80048 BASIC METABOLIC PNL TOTAL CA: CPT | Performed by: INTERNAL MEDICINE

## 2022-03-25 PROCEDURE — 99233 SBSQ HOSP IP/OBS HIGH 50: CPT | Mod: ,,, | Performed by: INTERNAL MEDICINE

## 2022-03-25 PROCEDURE — 83735 ASSAY OF MAGNESIUM: CPT | Performed by: INTERNAL MEDICINE

## 2022-03-25 PROCEDURE — 80076 HEPATIC FUNCTION PANEL: CPT | Performed by: INTERNAL MEDICINE

## 2022-03-25 PROCEDURE — 27000248 HC VAD-ADDITIONAL DAY

## 2022-03-25 PROCEDURE — 94761 N-INVAS EAR/PLS OXIMETRY MLT: CPT

## 2022-03-25 PROCEDURE — 85025 COMPLETE CBC W/AUTO DIFF WBC: CPT | Performed by: INTERNAL MEDICINE

## 2022-03-25 PROCEDURE — 36415 COLL VENOUS BLD VENIPUNCTURE: CPT | Performed by: INTERNAL MEDICINE

## 2022-03-25 PROCEDURE — 83880 ASSAY OF NATRIURETIC PEPTIDE: CPT | Performed by: INTERNAL MEDICINE

## 2022-03-25 PROCEDURE — 93750 INTERROGATION VAD IN PERSON: CPT | Mod: ,,, | Performed by: INTERNAL MEDICINE

## 2022-03-25 PROCEDURE — 25000003 PHARM REV CODE 250: Performed by: INTERNAL MEDICINE

## 2022-03-25 PROCEDURE — 97530 THERAPEUTIC ACTIVITIES: CPT

## 2022-03-25 PROCEDURE — 84134 ASSAY OF PREALBUMIN: CPT | Performed by: INTERNAL MEDICINE

## 2022-03-25 RX ORDER — WARFARIN 2.5 MG/1
2.5 TABLET ORAL ONCE
Status: COMPLETED | OUTPATIENT
Start: 2022-03-25 | End: 2022-03-25

## 2022-03-25 RX ORDER — POTASSIUM CHLORIDE 20 MEQ/1
40 TABLET, EXTENDED RELEASE ORAL
Status: COMPLETED | OUTPATIENT
Start: 2022-03-25 | End: 2022-03-25

## 2022-03-25 RX ADMIN — WARFARIN SODIUM 2.5 MG: 2.5 TABLET ORAL at 04:03

## 2022-03-25 RX ADMIN — AMLODIPINE BESYLATE 10 MG: 10 TABLET ORAL at 08:03

## 2022-03-25 RX ADMIN — LISINOPRIL 20 MG: 20 TABLET ORAL at 08:03

## 2022-03-25 RX ADMIN — POTASSIUM CHLORIDE 40 MEQ: 1500 TABLET, EXTENDED RELEASE ORAL at 08:03

## 2022-03-25 RX ADMIN — DOCUSATE SODIUM 100 MG: 100 CAPSULE ORAL at 04:03

## 2022-03-25 RX ADMIN — ATORVASTATIN CALCIUM 80 MG: 20 TABLET, FILM COATED ORAL at 08:03

## 2022-03-25 RX ADMIN — POTASSIUM CHLORIDE 40 MEQ: 1500 TABLET, EXTENDED RELEASE ORAL at 09:03

## 2022-03-25 RX ADMIN — SPIRONOLACTONE 25 MG: 25 TABLET, FILM COATED ORAL at 08:03

## 2022-03-25 RX ADMIN — DIGOXIN 0.12 MG: 125 TABLET ORAL at 08:03

## 2022-03-25 RX ADMIN — LISINOPRIL 10 MG: 10 TABLET ORAL at 08:03

## 2022-03-25 NOTE — PLAN OF CARE
Problem: Adult Inpatient Plan of Care  Goal: Patient-Specific Goal (Individualized)  Outcome: Ongoing, Progressing  Flowsheets (Taken 3/25/2022 8083)  Anxieties, Fears or Concerns: None voiced  Individualized Care Needs: Monitor LVAD#. Make sure pt performs LVAD dressing change. Maintain BG protocol. Monitor Electrolytes. Monitor for blurry vission.  Patient-Specific Goals (Include Timeframe): Pt will be free of falls throughout shift.    Plan of care discussed with patient.  Patient ambulating independently, fall precautions in place. LVAD DP and numbers WNL, smooth LVAD hum. Patient has no complaints of pain. Discussed medications and care. Potassium 3.3; replaced.  Patient has no questions at this time. Will continue to monitor throughout shift.

## 2022-03-25 NOTE — PROGRESS NOTES
Pt AAAO, no family at bedside.  Reviewed alarms with pt.  PT wondering when he can go home.  I explained I think this weekend.  No further questions.  Emotional support provided.

## 2022-03-25 NOTE — PROGRESS NOTES
03/25/2022  Micah WALTER Velez Jr    Current provider:  Deana Lake MD    Device interrogation:  TXP LVAD INTERROGATIONS 3/25/2022 3/24/2022 3/24/2022 3/24/2022 3/24/2022 3/24/2022 3/23/2022   Type HeartMate3 HeartMate3 HeartMate3 HeartMate3 HeartMate3 HeartMate3 HeartMate3   Flow 4.2 4.1 4.1 4.5 4.1 4.2 4.3   Speed 5100 5100 5100 5100 5100 5100 5100   PI 5.3 5.3 5.8 4.7 4.8 4.9 4.6   Power (Edwards) 3.5 3.5 3.5 3.5 3.6 3.6 3.6   LSL 4700 - - 4700 4700 4700 4700   Pulsatility Pulse - - Pulse Pulse Pulse Pulse          Rounded on Rocco France to ensure all mechanical assist device settings (IABP or VAD) were appropriate and all parameters were within limits.  I was able to ensure all back up equipment was present, the staff had no issues, and the Perfusion Department daily rounding was complete.      For implantable VADs: Interrogation of Ventricular assist device was performed with analysis of device parameters and review of device function. I have personally reviewed the interrogation findings and agree with findings as stated.     In emergency, the nursing units have been notified to contact the perfusion department either by:  Calling [phone] from 630am to 4pm Mon thru Fri, utilizing the On-Call Finder functionality of Epic and searching for Perfusion, or by contacting the hospital  from 4pm to 630am and on weekends and asking to speak with the perfusionist on call.    8:14 AM

## 2022-03-25 NOTE — PT/OT/SLP EVAL
"Physical Therapy Evaluation    Patient Name:  Rocco France   MRN:  [id number]    Recommendations:     Discharge Recommendations:  outpatient PT (vestibular therapy)   Discharge Equipment Recommendations: none   Barriers to discharge: None    Assessment:     Rocco France is a 66 y.o. male admitted with a medical diagnosis of dizziness.  He presents with the following impairments/functional limitations:  impaired functional mobilty, impaired balance. The patient reports transient diplopia, dizziness and lightheadedness when he changes position from sitting to standing or when closing his eyes in standing such as when washing his face. He denies true vertigo, "the room isn't spinning" with the onset of dizziness. He has not had an episode of dizziness today. With position change, LVAD parameters WNL, no dizziness with mobility, stood with modified independence, ambulated 150' with no AD and supervision assistance. Jane hallpike and supine roll tests were negative for BPPV. He was unable to maintain Romberg stance due to unsteadiness, required minimum assistance to recover balance. His dizziness may be due in part to vestibular hypofunction, but based on assessment and presentation it is unlikely that he is experiencing BPPV. He is safe to return home with outpatient PT to address the above deficits.      Rehab Prognosis: Good; patient would benefit from acute skilled PT services to address these deficits and reach maximum level of function.    Recent Surgery: Procedure(s) (LRB):  INSERTION, CATHETER, RIGHT HEART (Right) 1 Day Post-Op    Plan:     During this hospitalization, patient to be seen 2 x/week to address the identified rehab impairments via gait training, therapeutic activities, therapeutic exercises, neuromuscular re-education and progress toward the following goals:    · Plan of Care Expires:  04/24/22    Subjective     Chief Complaint: "The dizziness doesn't last long, it's been going on for the past few " "days"  Patient/Family Comments/goals: improve balance  Pain/Comfort:  · Pain Rating 1: 0/10    Patients cultural, spiritual, Yazdanism conflicts given the current situation: no    Living Environment:  The patient lives with his wife in a SSH, 3 MARCIO, tub-shower combination.   Prior to admission, patients level of function was independent.  Equipment used at home: none.  DME owned (not currently used): shower chair.  Upon discharge, patient will have assistance from family.    Objective:     Communicated with RN prior to session.  Patient found up in chair with telemetry, LVAD  upon PT entry to room.    General Precautions: Standard, LVAD, fall   Orthopedic Precautions:N/A   Braces: N/A  Respiratory Status: Room air    Exams:    Cognitive Exam  Patient is A&O x and follows 100% of one -step commands    Fine Motor Coordination   -       WNL     Postural Exam Patient presented with the following abnormalities:    -       Rounded shoulders  -       Forward head     Sensation    -       Light touch intact BRITTANY LE   Skin Integrity/Edema     -       Skin integrity: visibly intact  -       Edema: NA   R LE ROM WNL   R LE Strength WNL   L LE ROM WNL   L LE Strength  WNL       Balance   Static Sitting independent    Dynamic Sitting independent    Static Standing independent    Dynamic Standing       Wide TAMI eyes open: WNL  Wide TAMI eyes closed: Increased spontaneous sway  Narrow TAMI eyes open: increased spontaneous sway  Narrow TAMI eyes closed: increased spontaneous sway  Tandem stance R leading: unable to perform, unsteadiness,  Tandem stance L leading: unable to perform, unsteadiness  Gait with horizontal/vertical head turns: decreased gait speed, no loss of balance      Vestibular Assessment:  Subjective Report:  Dizziness with sit to stand and standing with eyes closed. Also reports some diplopia that comes and goes. Dizziness is transient lasting several minutes. He denies vertigo, denies dizziness with rolling in bed, " denies dizziness with supine <>sit,     Vestibular Testing:  Oculomotor Exam:  1. Spontaneous Nystagmus- none  2. Gaze Holding Nystagmus- none  3. Ocular ROM- normal  4. Smooth Pursuit- normal  5. Saccades- normal  6. Head Thrust Test- negative bilaterally    Positional Testin. [place] Hallpike- negative bilaterally  2. Roll Test- negative bilaterally    Pressure Testing for perilymph fistula, Meniere's Dx, or Superior semi circular canal dehiscence   1. Valsalva- negative    Balance Testin. Rhomberg w/ eyes open and closed- Unable to perform with eyes open due to unsteadiness with narrowed base of support, required minimum assistance for balance   2. Gait with dynamic head turns vertically/horizontally- WNL          Functional Mobility:    Bed Mobility  Deferred, sitting up in chair   Transfers Sit to Stand:  independent    Gait  Gait Distance: 150 ft with no Ad  Assistance Level: supervision assistance   Description: decreased gait speed, decreased step length          Therapeutic Activities and Exercises:   Patient educated on role of therapy, goals of session, benefits of out of bed mobility. Patient agreeable to mobilize with therapy.  Discussed PT plan of care during hospitalization. Patient educated that they need to call for assistance to mobilize out of bed. Whiteboard updated as appropriate. Patient educated on how their diagnosis impacts their mobility within PT scope of practice.     Patient educated about mechanism of BPPV, testing, and treatment. Patient's symptoms not consistent with typical BPPV presentation. Patient verbalized understanding.     Patient educated on PT schedule.  Encouraged patient to ambulate 3x/day, sit up in chair 3x/day to prevent deconditioning during hospitalization. Patient verbalized understanding and agreement not to mobilize without RN assist. Patient in agreement with PT POC, home with outpatient PT.    LVAD:   Patient found with LVAD on wall power  Performed  transition from LVAD wall power > battery power with independence   Educated on proper positioning of driveline when wearing consolidation bag.  Educated on importance of checking anchor daily.   Patient left on wall power at end of therapy session   No LVAD alarms sounded during session       Patient is safe to ambulate with 1 person supervision.     AM-PAC 6 CLICK MOBILITY  Total Score:24     Patient left up in chair with all lines intact and call button in reach.    GOALS:   Multidisciplinary Problems     Physical Therapy Goals        Problem: Physical Therapy    Goal Priority Disciplines Outcome Goal Variances Interventions   Physical Therapy Goal     PT, PT/OT Ongoing, Progressing     Description: Goals to be met by:  4/5    Patient will increase functional independence with mobility by performin. Gait  x 400 feet with Modified [place] using No Assistive Device.   2. Lower extremity exercise program x30 reps per handout, with independence  3. Performs Romberg stance 10 seconds with contact guard assist corresponding to a decreased fall risk.                      History:     Past Medical History:   Diagnosis Date    CLARISSE (acute kidney injury) 2021    CHF (congestive heart failure)     Chronic combined systolic and diastolic congestive heart failure 2021    Coronary artery disease     Diabetes mellitus     Hypertension     Kidney stones        Past Surgical History:   Procedure Laterality Date    CARDIAC CATHETERIZATION      no stents    CARDIAC DEFIBRILLATOR PLACEMENT      CARDIAC DEFIBRILLATOR PLACEMENT      HERNIA REPAIR      IRRIGATION OF MEDIASTINUM N/A 2021    Procedure: IRRIGATION, MEDIASTINUM;  Surgeon: Zenon Corona MD;  Location: [facility] OR [address] [place] [address];  Service: Cardiovascular;  Laterality: N/A;    KNEE ARTHROSCOPY Right     LEFT VENTRICULAR ASSIST DEVICE Left 2021    Procedure: INSERTION- HeartMate 3 LEFT VENTRICULAR ASSIST DEVICE ;  Surgeon: Zenon  MD Chloe;  [address]  Service: Cardiovascular;  Laterality: Left;    RECONSTRUCTION OF PERICARDIUM N/A 1/14/2021    Procedure: RECONSTRUCTION, PERICARDIUM;  Surgeon: Zenon Corona MD;  [address];  Service: Cardiovascular;  Laterality: N/A;    RIGHT HEART CATHETERIZATION Right 11/2/2020    Procedure: INSERTION, CATHETER, RIGHT HEART;  Surgeon: Deana Lake MD;  Location: [facility] CATH LAB;  Service: Cardiology;  Laterality: Right;    RIGHT HEART CATHETERIZATION Right 3/24/2022    Procedure: INSERTION, CATHETER, RIGHT HEART;  Surgeon: Manuel Ramos Jr., MD;  Location: [facility] CATH LAB;  Service: Cardiology;  Laterality: Right;    STERNAL WOUND CLOSURE  1/13/2021    Procedure: TEMPORARY CLOSURE OF CHEST;  Surgeon: Zenon Corona MD;  Location: [address];  Service: Cardiovascular;;    STERNAL WOUND CLOSURE N/A 1/14/2021    Procedure: CLOSURE, WOUND, STERNUM;  Surgeon: Zenon Corona MD;  Location: [address];  Service: Cardiovascular;  Laterality: N/A;       Time Tracking:     PT Received On: 03/25/22  PT Start Time: 1320     PT Stop Time: 1349  PT Total Time (min): 29 min     Billable Minutes: Evaluation 10 and Therapeutic Activity 19 03/25/2022   NECK PAIN/BACK PAIN

## 2022-03-25 NOTE — PROGRESS NOTES
Discharge    Spoke to pt via pt's cell phone regarding discharge. Pt presents aaox4 with open affect. Pt voices agreement with plan to discharge over the weekend if medically cleared. Pt requests he be continued with [facility]  [phone], fax [phone]. Orders faxed. Pt's spouse will transport. Pt reports coping well and denies further needs, questions, concerns at this time and none indicated. Providing psychosocial and counseling support, education, resources, assistance and discharge planning as indicated. SW remains available.

## 2022-03-25 NOTE — PLAN OF CARE
Pt free of falls/trauma/injuries.  Denies c/o SOB, CP, or discomfort.  Generalized skin remains CDI; Mild edema noted.  LVAD working properly this shift without any complications.  LVAD dressing to be changed EOD with kit; dressing remains CDI.   Pt being diuresed with PO diuretics; diuresing well.   Wt trending down.  Pt tolerating plan of care.

## 2022-03-25 NOTE — ASSESSMENT & PLAN NOTE
Procedure: Device Interrogation Including analysis of device parameters  Current Settings: Ventricular Assist Device  Review of device function is stable  INR1.8, goal to be 2-3 with h/o afib (though he had SAH, traumatic in 1/2022), coumadin      TXP LVAD INTERROGATIONS 3/25/2022 3/25/2022 3/24/2022 3/24/2022 3/24/2022 3/24/2022 3/24/2022   Type HeartMate3 HeartMate3 HeartMate3 HeartMate3 HeartMate3 HeartMate3 HeartMate3   Flow 4.1 4.2 4.1 4.1 4.5 4.1 4.2   Speed 5100 5100 5100 5100 5100 5100 5100   PI 5.8 5.3 5.3 5.8 4.7 4.8 4.9   Power (Edwards) 3.5 3.5 3.5 3.5 3.5 3.6 3.6   LSL - 4700 - - 4700 4700 4700   Pulsatility - Pulse - - Pulse Pulse Pulse

## 2022-03-25 NOTE — SUBJECTIVE & OBJECTIVE
Interval History:   -doing well, no acute issues, Cr normalized with diuretic break, will resume lasix and K today  -anticipate d/c today    Continuous Infusions:  Scheduled Meds:   amLODIPine  10 mg Oral QHS    atorvastatin  80 mg Oral Daily    digoxin  0.125 mg Oral Daily    docusate sodium  100 mg Oral Daily    lisinopriL  10 mg Oral Daily    lisinopriL  20 mg Oral QHS    spironolactone  25 mg Oral Daily    warfarin  2.5 mg Oral Once per day on Sun Tue Wed Fri Sat    warfarin  5 mg Oral Once per day on Mon Thu     PRN Meds:acetaminophen, dextrose 10%, dextrose 10%, glucagon (human recombinant), glucose, glucose, LIDOcaine-EPINEPHrine 1%-1:100,000, polyethylene glycol    Review of patient's allergies indicates:   Allergen Reactions    Pcn [penicillins] Hives     Objective:     Vital Signs (Most Recent):  Temp: 98.3 °F (36.8 °C) (03/25/22 1506)  Pulse: 94 (03/25/22 1600)  Resp: 18 (03/25/22 1506)  BP: (!) 76/0 (03/25/22 1506)  SpO2: 96 % (03/25/22 1506)   Vital Signs (24h Range):  Temp:  [97.2 °F (36.2 °C)-98.9 °F (37.2 °C)] 98.3 °F (36.8 °C)  Pulse:  [] 94  Resp:  [16-18] 18  SpO2:  [95 %-100 %] 96 %  BP: ()/(0-76) 76/0     Patient Vitals for the past 72 hrs (Last 3 readings):   Weight   03/25/22 0500 81.6 kg (179 lb 14.3 oz)   03/24/22 0515 82.8 kg (182 lb 8.7 oz)   03/23/22 1605 84.8 kg (187 lb)     Body mass index is 23.1 kg/m².      Intake/Output Summary (Last 24 hours) at 3/25/2022 1710  Last data filed at 3/25/2022 1300  Gross per 24 hour   Intake 1740 ml   Output 1210 ml   Net 530 ml       Hemodynamic Parameters:       Telemetry: rev'd    Physical Exam  Constitutional:       Appearance: Normal appearance.   HENT:      Right Ear: Tympanic membrane normal.      Left Ear: Tympanic membrane normal.      Nose: Nose normal.   Eyes:      Extraocular Movements: Extraocular movements intact.   Cardiovascular:      Rate and Rhythm: afib, rate controlled  Pulmonary:      Effort: Pulmonary effort is normal.       Breath sounds: Normal breath sounds.   Abdominal:      Tenderness: There is no abdominal tenderness.   Musculoskeletal:         General: Normal range of motion.      Cervical back: Normal range of motion.   Skin:     General: Skin is warm.   Neurological:      General: No focal deficit present.      Mental Status: He is alert.   Psychiatric:         Mood and Affect: Mood normal.   Significant Labs:  CBC:  Recent Labs   Lab 03/23/22  0300 03/24/22  0402 03/25/22  0532   WBC 10.11 9.48 9.39   RBC 3.59* 3.66* 3.48*   HGB 9.8* 9.8* 9.5*   HCT 33.5* 32.3* 32.0*    187 194   MCV 93 88 92   MCH 27.3 26.8* 27.3   MCHC 29.3* 30.3* 29.7*     BNP:  Recent Labs   Lab 03/22/22  1035 03/25/22  0532   * 915*     CMP:  Recent Labs   Lab 03/22/22  1035 03/22/22  1751 03/23/22  0300 03/24/22  0403 03/25/22  0532   *  --  119* 92 96   CALCIUM 8.7  --  9.1 9.3 8.8   ALBUMIN 2.5*  --  2.5*  --  2.2*   PROT 8.0  --  7.9  --  7.5     --  139 138 134*   K 3.0*   < > 4.9 4.0 3.3*   CO2 29  --  25 26 26     --  103 103 101   BUN 18  --  22 24* 24*   CREATININE 1.4  --  1.6* 1.8* 1.5*   ALKPHOS 173*  --  181*  --  170*   ALT 24  --  28  --  48*   AST 36  --  42*  --  57*   BILITOT 2.7*  --  2.7*  --  2.9*    < > = values in this interval not displayed.      Coagulation:   Recent Labs   Lab 03/23/22  0300 03/24/22  0403 03/25/22  0532   INR 2.1* 1.9* 1.8*   APTT 33.6* 33.3* 33.3*     LDH:  Recent Labs   Lab 03/23/22  0300 03/24/22  0841   * 345*     Microbiology:  Microbiology Results (last 7 days)       ** No results found for the last 168 hours. **            I have reviewed all pertinent labs within the past 24 hours.    Estimated Creatinine Clearance: 55.9 mL/min (A) (based on SCr of 1.5 mg/dL (H)).    Diagnostic Results:  I have reviewed all pertinent imaging results/findings within the past 24 hours.

## 2022-03-25 NOTE — PLAN OF CARE
Problem: Physical Therapy  Goal: Physical Therapy Goal  Description: Goals to be met by:  4/5    Patient will increase functional independence with mobility by performin. Gait  x 400 feet with Modified [place] using No Assistive Device.   2. Lower extremity exercise program x30 reps per handout, with independence  3. Performs Romberg stance 10 seconds with contact guard assist corresponding to a decreased fall risk.     Outcome: Ongoing, Progressing   Evaluation completed, initiated plan of care.   Delmy Gong, PT  3/25/2022

## 2022-03-25 NOTE — PLAN OF CARE
[facility]   Heart Transplant/VAD Clinic   [address] [place] [address]   [phone] after hours          [phone] fax     VAD HOME  HEALTH ORDERS      Admit to Home Health    Diagnosis:   Patient Active Problem List   Diagnosis    AICD (automatic cardioverter/defibrillator) present    NICM (nonischemic cardiomyopathy)    Leg pain, left    Essential hypertension    Type 2 diabetes mellitus without complication    Hyperlipidemia    Acute on chronic combined systolic and diastolic heart failure    CAD (coronary artery disease)    Counseling regarding advanced care planning and goals of care    CLARISSE (acute kidney injury)    LVAD (left ventricular assist device) present    Acute blood loss anemia    Anticoagulated    Bilateral subdural hematomas    Subarachnoid bleed    Acute respiratory failure requiring reintubation    PSVT (paroxysmal supraventricular tachycardia)    Tachycardia    Acute on chronic combined systolic (congestive) and diastolic (congestive) heart failure    Subarachnoid hemorrhage    Subdural bleeding    Dizziness    Diplopia       Patient is homebound due to:  NYHA Class IV HF. S/P LVAD placement.     Diet: Low Fat, Low cholesterol, 2Gm Na, Coumadin restrictions.    Acitivities: No Swimming, bathing, vacuuming, contact sports.    Fresh implants= Sternal Precautions    Nursing:   SN to complete comprehensive assessment including routine vital signs. Instruct on disease process and s/s of complications to report to MD. Review/verify medication list sent home with the patient at time of discharge  and instruct patient/caregiver as needed. Frequency may be adjusted depending on start of care date.    **LVAD driveline exit site dressing change is to be completed per LVAD patient/caregiver only**.    Please make sure patient is taking medicines as prescribed on discharge  Please obtain BP and notify if MAP>90 (sometimes he  does read BP on cuff)  Also notify physician if HR >100    LABS:  SN to perform labs: PT/INR per Coumadin clinic [phone].   Follow up INR/BMP/BNP/Mg date: 3/29/22  No Finger Sticks    Send initial Home Health orders to [facility] attending physician on call.  Send follow up questions to VAD clinic MD [phone] or [phone].

## 2022-03-25 NOTE — SUBJECTIVE & OBJECTIVE
Interval History:   -diuresis on hold after CLARISSE and RHC yesterday, Cr improving with it, today 1.5 from 1.8 yesterday   -will start back on oral lasix tomorrow  -subjectively doing well  -anticipate d/c over the weekend  -Cr 1.8 today, coumadin per pharmacy (coumadin for Afib and LVAD in the context of recent traumatic SAH/SDH)    Continuous Infusions:  Scheduled Meds:   amLODIPine  10 mg Oral QHS    atorvastatin  80 mg Oral Daily    digoxin  0.125 mg Oral Daily    docusate sodium  100 mg Oral Daily    lisinopriL  10 mg Oral Daily    lisinopriL  20 mg Oral QHS    spironolactone  25 mg Oral Daily    warfarin  2.5 mg Oral Once per day on Sun Tue Wed Fri Sat    warfarin  5 mg Oral Once per day on Mon Thu     PRN Meds:acetaminophen, dextrose 10%, dextrose 10%, glucagon (human recombinant), glucose, glucose, LIDOcaine-EPINEPHrine 1%-1:100,000, polyethylene glycol    Review of patient's allergies indicates:   Allergen Reactions    Pcn [penicillins] Hives     Objective:     Vital Signs (Most Recent):  Temp: 97.7 °F (36.5 °C) (03/25/22 0754)  Pulse: 105 (03/25/22 0754)  Resp: 18 (03/25/22 0754)  BP: 96/61 (03/25/22 0725)  SpO2: 99 % (03/25/22 0754) Vital Signs (24h Range):  Temp:  [96.5 °F (35.8 °C)-98.9 °F (37.2 °C)] 97.7 °F (36.5 °C)  Pulse:  [] 105  Resp:  [16-18] 18  SpO2:  [95 %-100 %] 99 %  BP: ()/(0-76) 96/61     Patient Vitals for the past 72 hrs (Last 3 readings):   Weight   03/25/22 0500 81.6 kg (179 lb 14.3 oz)   03/24/22 0515 82.8 kg (182 lb 8.7 oz)   03/23/22 1605 84.8 kg (187 lb)     Body mass index is 23.1 kg/m².      Intake/Output Summary (Last 24 hours) at 3/25/2022 1106  Last data filed at 3/25/2022 0900  Gross per 24 hour   Intake 1282 ml   Output 1460 ml   Net -178 ml       Hemodynamic Parameters:       Telemetry: rev'd    Physical Exam  Constitutional:       Appearance: Normal appearance.   HENT:      Right Ear: Tympanic membrane normal.      Left Ear: Tympanic membrane normal.      Nose:  Nose normal.   Eyes:      Extraocular Movements: Extraocular movements intact.   Cardiovascular:      Rate and Rhythm: afib, rate controlled  Pulmonary:      Effort: Pulmonary effort is normal.      Breath sounds: Normal breath sounds.   Abdominal:      Tenderness: There is no abdominal tenderness.   Musculoskeletal:         General: Normal range of motion.      Cervical back: Normal range of motion.   Skin:     General: Skin is warm.   Neurological:      General: No focal deficit present.      Mental Status: He is alert.   Psychiatric:         Mood and Affect: Mood normal.   Significant Labs:  CBC:  Recent Labs   Lab 03/23/22  0300 03/24/22  0402 03/25/22  0532   WBC 10.11 9.48 9.39   RBC 3.59* 3.66* 3.48*   HGB 9.8* 9.8* 9.5*   HCT 33.5* 32.3* 32.0*    187 194   MCV 93 88 92   MCH 27.3 26.8* 27.3   MCHC 29.3* 30.3* 29.7*     BNP:  Recent Labs   Lab 03/22/22  1035 03/25/22  0532   * 915*     CMP:  Recent Labs   Lab 03/22/22  1035 03/22/22  1751 03/23/22  0300 03/24/22  0403 03/25/22  0532   *  --  119* 92 96   CALCIUM 8.7  --  9.1 9.3 8.8   ALBUMIN 2.5*  --  2.5*  --  2.2*   PROT 8.0  --  7.9  --  7.5     --  139 138 134*   K 3.0*   < > 4.9 4.0 3.3*   CO2 29  --  25 26 26     --  103 103 101   BUN 18  --  22 24* 24*   CREATININE 1.4  --  1.6* 1.8* 1.5*   ALKPHOS 173*  --  181*  --  170*   ALT 24  --  28  --  48*   AST 36  --  42*  --  57*   BILITOT 2.7*  --  2.7*  --  2.9*    < > = values in this interval not displayed.      Coagulation:   Recent Labs   Lab 03/23/22  0300 03/24/22  0403 03/25/22  0532   INR 2.1* 1.9* 1.8*   APTT 33.6* 33.3* 33.3*     LDH:  Recent Labs   Lab 03/23/22  0300 03/24/22  0841   * 345*     Microbiology:  Microbiology Results (last 7 days)       ** No results found for the last 168 hours. **            I have reviewed all pertinent labs within the past 24 hours.    Estimated Creatinine Clearance: 55.9 mL/min (A) (based on SCr of 1.5 mg/dL  (H)).    Diagnostic Results:  I have reviewed all pertinent imaging results/findings within the past 24 hours.

## 2022-03-25 NOTE — PROGRESS NOTES
[facility] - Cardiology Stepdown  Heart Transplant  Progress Note    Patient Name: Rocco France  MRN: [id number]  Admission Date: 3/22/2022  Hospital Length of Stay: 2 days  Attending Physician: Deana Lake MD  Primary Care Provider: Jay Guardado DO  Principal Problem:<principal problem not specified>    Subjective:     Interval History:   -diuresis on hold after CLARISSE and RHC yesterday, Cr improving with it, today 1.5 from 1.8 yesterday   -will start back on oral lasix tomorrow  -subjectively doing well  -anticipate d/c over the weekend  -Cr 1.8 today, coumadin per pharmacy (coumadin for Afib and LVAD in the context of recent traumatic SAH/SDH)    Continuous Infusions:  Scheduled Meds:   amLODIPine  10 mg Oral QHS    atorvastatin  80 mg Oral Daily    digoxin  0.125 mg Oral Daily    docusate sodium  100 mg Oral Daily    lisinopriL  10 mg Oral Daily    lisinopriL  20 mg Oral QHS    spironolactone  25 mg Oral Daily    warfarin  2.5 mg Oral Once per day on Sun Tue Wed Fri Sat    warfarin  5 mg Oral Once per day on Mon Thu     PRN Meds:acetaminophen, dextrose 10%, dextrose 10%, glucagon (human recombinant), glucose, glucose, LIDOcaine-EPINEPHrine 1%-1:100,000, polyethylene glycol    Review of patient's allergies indicates:   Allergen Reactions    Pcn [penicillins] Hives     Objective:     Vital Signs (Most Recent):  Temp: 97.7 °F (36.5 °C) (03/25/22 0754)  Pulse: 105 (03/25/22 0754)  Resp: 18 (03/25/22 0754)  BP: 96/61 (03/25/22 0725)  SpO2: 99 % (03/25/22 0754) Vital Signs (24h Range):  Temp:  [96.5 °F (35.8 °C)-98.9 °F (37.2 °C)] 97.7 °F (36.5 °C)  Pulse:  [] 105  Resp:  [16-18] 18  SpO2:  [95 %-100 %] 99 %  BP: ()/(0-76) 96/61     Patient Vitals for the past 72 hrs (Last 3 readings):   Weight   03/25/22 0500 81.6 kg (179 lb 14.3 oz)   03/24/22 0515 82.8 kg (182 lb 8.7 oz)   03/23/22 1605 84.8 kg (187 lb)     Body mass index is 23.1 kg/m².      Intake/Output Summary (Last 24 hours) at  3/25/2022 1106  Last data filed at 3/25/2022 0900  Gross per 24 hour   Intake 1282 ml   Output 1460 ml   Net -178 ml       Hemodynamic Parameters:       Telemetry: rev'd    Physical Exam  Constitutional:       Appearance: Normal appearance.   HENT:      Right Ear: Tympanic membrane normal.      Left Ear: Tympanic membrane normal.      Nose: Nose normal.   Eyes:      Extraocular Movements: Extraocular movements intact.   Cardiovascular:      Rate and Rhythm: afib, rate controlled  Pulmonary:      Effort: Pulmonary effort is normal.      Breath sounds: Normal breath sounds.   Abdominal:      Tenderness: There is no abdominal tenderness.   Musculoskeletal:         General: Normal range of motion.      Cervical back: Normal range of motion.   Skin:     General: Skin is warm.   Neurological:      General: No focal deficit present.      Mental Status: He is alert.   Psychiatric:         Mood and Affect: Mood normal.   Significant Labs:  CBC:  Recent Labs   Lab 03/23/22  0300 03/24/22  0402 03/25/22  0532   WBC 10.11 9.48 9.39   RBC 3.59* 3.66* 3.48*   HGB 9.8* 9.8* 9.5*   HCT 33.5* 32.3* 32.0*    187 194   MCV 93 88 92   MCH 27.3 26.8* 27.3   MCHC 29.3* 30.3* 29.7*     BNP:  Recent Labs   Lab 03/22/22  1035 03/25/22  0532   * 915*     CMP:  Recent Labs   Lab 03/22/22  1035 03/22/22  1751 03/23/22  0300 03/24/22  0403 03/25/22  0532   *  --  119* 92 96   CALCIUM 8.7  --  9.1 9.3 8.8   ALBUMIN 2.5*  --  2.5*  --  2.2*   PROT 8.0  --  7.9  --  7.5     --  139 138 134*   K 3.0*   < > 4.9 4.0 3.3*   CO2 29  --  25 26 26     --  103 103 101   BUN 18  --  22 24* 24*   CREATININE 1.4  --  1.6* 1.8* 1.5*   ALKPHOS 173*  --  181*  --  170*   ALT 24  --  28  --  48*   AST 36  --  42*  --  57*   BILITOT 2.7*  --  2.7*  --  2.9*    < > = values in this interval not displayed.      Coagulation:   Recent Labs   Lab 03/23/22  0300 03/24/22  0403 03/25/22  0532   INR 2.1* 1.9* 1.8*   APTT 33.6* 33.3* 33.3*  "    LDH:  Recent Labs   Lab 03/23/22  0300 03/24/22  0841   * 345*     Microbiology:  Microbiology Results (last 7 days)       ** No results found for the last 168 hours. **            I have reviewed all pertinent labs within the past 24 hours.    Estimated Creatinine Clearance: 55.9 mL/min (A) (based on SCr of 1.5 mg/dL (H)).    Diagnostic Results:  I have reviewed all pertinent imaging results/findings within the past 24 hours.    Assessment and Plan:     65 yo BM with stage D CHF due to NICMP s/p HM3, s/p ICD, HTN, HLD, T2DM, h/o subdural/subarachnoid hemorrhage presents to ED with 3 days of dizzness and double vision. Reports feeling "woozy" and lightheaded since Sunday morning. He has also noticed double vision. He also reprots equilibrium issues while walking. His symptoms have been stable since they started. Dizziness is on and off and exacerbates on standing up. He denies any chest pain, falls, increasing SOB or LOC. He does feel his weight is up by "couple pounds". Yesterday he went to get bloodwork for his INR and when he got the call to discuss results, he was advised to present to ED. In the ER, he is volume up and will be brought in for IV diuresis and neurology evaluation.       Dizziness  Diplopia, diziness and disequilibrium x 2 days prior to arrival  No LOC, syncope, chest pain, dyspnea  CT head w/o contrast unremarkable for an acute pathology in the ER  Unable to obtain MRI   Consulted neurology who recd'd ophthalmology eval, ophthalmology recd'd outpt evaluation    CLARISSE (acute kidney injury)  Cr 1.5 today from 1.8 yesterday with holding diuresis  RHC obtained 3/24 after upping VAD speed by 100 on 3/23  RA: 16/ 16/ 11 RV: 30/ 10 PA: 35/ 16/ 22 PWP: 15/ 19/ 14 .   Cardiac output was 6.5  by Monse. Cardiac index is 3.1 L/min/m2.       LVAD (left ventricular assist device) present  Procedure: Device Interrogation Including analysis of device parameters  Current Settings: Ventricular Assist " Device  Review of device function is stable  INR1.8, goal to be 2-3 with h/o afib (though he had SAH, traumatic in 1/2022), coumadin      TXP LVAD INTERROGATIONS 3/25/2022 3/25/2022 3/24/2022 3/24/2022 3/24/2022 3/24/2022 3/24/2022   Type HeartMate3 HeartMate3 HeartMate3 HeartMate3 HeartMate3 HeartMate3 HeartMate3   Flow 4.1 4.2 4.1 4.1 4.5 4.1 4.2   Speed 5100 5100 5100 5100 5100 5100 5100   PI 5.8 5.3 5.3 5.8 4.7 4.8 4.9   Power (Edwards) 3.5 3.5 3.5 3.5 3.5 3.6 3.6   LSL - 4700 - - 4700 4700 4700   Pulsatility - Pulse - - Pulse Pulse Pulse       Diplopia  Ophthalmology evaluated the patient on 3/23 and recradha'd outpatient evaluation        Manuel Mario MD  Heart Transplant  [facility] - Cardiology Stepdown

## 2022-03-26 VITALS
WEIGHT: 180.13 LBS | OXYGEN SATURATION: 95 % | TEMPERATURE: 99 F | HEIGHT: 74 IN | SYSTOLIC BLOOD PRESSURE: 70 MMHG | HEART RATE: 75 BPM | BODY MASS INDEX: 23.12 KG/M2 | RESPIRATION RATE: 18 BRPM

## 2022-03-26 LAB
ANION GAP SERPL CALC-SCNC: 6 MMOL/L (ref 8–16)
APTT BLDCRRT: 32.2 SEC (ref 21–32)
BASOPHILS # BLD AUTO: 0.03 K/UL (ref 0–0.2)
BASOPHILS NFR BLD: 0.4 % (ref 0–1.9)
BUN SERPL-MCNC: 21 MG/DL (ref 8–23)
CALCIUM SERPL-MCNC: 8.8 MG/DL (ref 8.7–10.5)
CHLORIDE SERPL-SCNC: 105 MMOL/L (ref 95–110)
CO2 SERPL-SCNC: 26 MMOL/L (ref 23–29)
CREAT SERPL-MCNC: 1.3 MG/DL (ref 0.5–1.4)
DIFFERENTIAL METHOD: ABNORMAL
EOSINOPHIL # BLD AUTO: 0.2 K/UL (ref 0–0.5)
EOSINOPHIL NFR BLD: 2.7 % (ref 0–8)
ERYTHROCYTE [DISTWIDTH] IN BLOOD BY AUTOMATED COUNT: 15.9 % (ref 11.5–14.5)
EST. GFR  (AFRICAN AMERICAN): >60 ML/MIN/1.73 M^2
EST. GFR  (NON AFRICAN AMERICAN): 56.9 ML/MIN/1.73 M^2
GLUCOSE SERPL-MCNC: 99 MG/DL (ref 70–110)
HCT VFR BLD AUTO: 32.4 % (ref 40–54)
HGB BLD-MCNC: 9.6 G/DL (ref 14–18)
IMM GRANULOCYTES # BLD AUTO: 0.03 K/UL (ref 0–0.04)
IMM GRANULOCYTES NFR BLD AUTO: 0.4 % (ref 0–0.5)
INR PPP: 1.8 (ref 0.8–1.2)
LDH SERPL L TO P-CCNC: 309 U/L (ref 110–260)
LYMPHOCYTES # BLD AUTO: 2 K/UL (ref 1–4.8)
LYMPHOCYTES NFR BLD: 24.9 % (ref 18–48)
MAGNESIUM SERPL-MCNC: 2.1 MG/DL (ref 1.6–2.6)
MCH RBC QN AUTO: 27 PG (ref 27–31)
MCHC RBC AUTO-ENTMCNC: 29.6 G/DL (ref 32–36)
MCV RBC AUTO: 91 FL (ref 82–98)
MONOCYTES # BLD AUTO: 1 K/UL (ref 0.3–1)
MONOCYTES NFR BLD: 12.7 % (ref 4–15)
NEUTROPHILS # BLD AUTO: 4.8 K/UL (ref 1.8–7.7)
NEUTROPHILS NFR BLD: 58.9 % (ref 38–73)
NRBC BLD-RTO: 0 /100 WBC
PLATELET # BLD AUTO: 190 K/UL (ref 150–450)
PMV BLD AUTO: 11.1 FL (ref 9.2–12.9)
POCT GLUCOSE: 104 MG/DL (ref 70–110)
POCT GLUCOSE: 176 MG/DL (ref 70–110)
POCT GLUCOSE: 181 MG/DL (ref 70–110)
POTASSIUM SERPL-SCNC: 3.7 MMOL/L (ref 3.5–5.1)
PROTHROMBIN TIME: 17.9 SEC (ref 9–12.5)
RBC # BLD AUTO: 3.56 M/UL (ref 4.6–6.2)
SODIUM SERPL-SCNC: 137 MMOL/L (ref 136–145)
WBC # BLD AUTO: 8.11 K/UL (ref 3.9–12.7)

## 2022-03-26 PROCEDURE — 99233 PR SUBSEQUENT HOSPITAL CARE,LEVL III: ICD-10-PCS | Mod: ,,, | Performed by: INTERNAL MEDICINE

## 2022-03-26 PROCEDURE — 85025 COMPLETE CBC W/AUTO DIFF WBC: CPT | Performed by: INTERNAL MEDICINE

## 2022-03-26 PROCEDURE — 83615 LACTATE (LD) (LDH) ENZYME: CPT | Performed by: INTERNAL MEDICINE

## 2022-03-26 PROCEDURE — 25000003 PHARM REV CODE 250: Performed by: INTERNAL MEDICINE

## 2022-03-26 PROCEDURE — 36415 COLL VENOUS BLD VENIPUNCTURE: CPT | Performed by: INTERNAL MEDICINE

## 2022-03-26 PROCEDURE — 93750 INTERROGATION VAD IN PERSON: CPT | Mod: ,,, | Performed by: INTERNAL MEDICINE

## 2022-03-26 PROCEDURE — 85730 THROMBOPLASTIN TIME PARTIAL: CPT | Performed by: INTERNAL MEDICINE

## 2022-03-26 PROCEDURE — 85610 PROTHROMBIN TIME: CPT | Performed by: INTERNAL MEDICINE

## 2022-03-26 PROCEDURE — 80048 BASIC METABOLIC PNL TOTAL CA: CPT | Performed by: INTERNAL MEDICINE

## 2022-03-26 PROCEDURE — 93750 PR INTERROGATE VENT ASSIST DEV, IN PERSON, W PHYSICIAN ANALYSIS: ICD-10-PCS | Mod: ,,, | Performed by: INTERNAL MEDICINE

## 2022-03-26 PROCEDURE — 94761 N-INVAS EAR/PLS OXIMETRY MLT: CPT

## 2022-03-26 PROCEDURE — 83735 ASSAY OF MAGNESIUM: CPT | Performed by: INTERNAL MEDICINE

## 2022-03-26 PROCEDURE — 27000248 HC VAD-ADDITIONAL DAY

## 2022-03-26 PROCEDURE — 99233 SBSQ HOSP IP/OBS HIGH 50: CPT | Mod: ,,, | Performed by: INTERNAL MEDICINE

## 2022-03-26 RX ORDER — DIGOXIN 125 MCG
0.12 TABLET ORAL DAILY
Qty: 90 TABLET | Refills: 0 | Status: SHIPPED | OUTPATIENT
Start: 2022-03-27 | End: 2023-03-16 | Stop reason: SDUPTHER

## 2022-03-26 RX ORDER — POTASSIUM CHLORIDE 20 MEQ/1
40 TABLET, EXTENDED RELEASE ORAL
Status: COMPLETED | OUTPATIENT
Start: 2022-03-26 | End: 2022-03-26

## 2022-03-26 RX ORDER — FUROSEMIDE 40 MG/1
40 TABLET ORAL 2 TIMES DAILY
Status: DISCONTINUED | OUTPATIENT
Start: 2022-03-26 | End: 2022-03-26 | Stop reason: HOSPADM

## 2022-03-26 RX ORDER — AMLODIPINE BESYLATE 10 MG/1
10 TABLET ORAL NIGHTLY
Qty: 90 TABLET | Refills: 3 | Status: SHIPPED | OUTPATIENT
Start: 2022-03-26 | End: 2022-07-13

## 2022-03-26 RX ADMIN — SPIRONOLACTONE 25 MG: 25 TABLET, FILM COATED ORAL at 08:03

## 2022-03-26 RX ADMIN — POTASSIUM CHLORIDE 40 MEQ: 1500 TABLET, EXTENDED RELEASE ORAL at 12:03

## 2022-03-26 RX ADMIN — DIGOXIN 0.12 MG: 125 TABLET ORAL at 08:03

## 2022-03-26 RX ADMIN — FUROSEMIDE 40 MG: 40 TABLET ORAL at 01:03

## 2022-03-26 RX ADMIN — POTASSIUM CHLORIDE 40 MEQ: 1500 TABLET, EXTENDED RELEASE ORAL at 09:03

## 2022-03-26 RX ADMIN — LISINOPRIL 10 MG: 10 TABLET ORAL at 08:03

## 2022-03-26 RX ADMIN — ATORVASTATIN CALCIUM 80 MG: 20 TABLET, FILM COATED ORAL at 08:03

## 2022-03-26 NOTE — HOSPITAL COURSE
Pt came with neuro symptoms of dizziness, diplopia and disequilibiurm x 2-3 days. Neurology evaluated, CT head was negative, Ortho VS were also negative. Neuro wanted MRI but LVAD was the contraindication. They ordered myesthenia panel which is still in process at the time of discharge. His neuro symptoms have resolved. Neuro recd'd ophthalmology evaluation who came and recd'd outpatient f/up.   Upon arrival, he was in rapid afib and also BP towards the higher side needing addition of norvasc 10 and several IV hydralazine pushes. We loaded with 1 g IV dig and then started 125 mcg daily of digoxin. Currenlty, BP has been in 70s MAPs on cuff and afib is also rate controlled. Upon arrival, will provide with scripts of norvasc and dig.   He was found to be volume up on arrivla and Echo had showed large LVIDD and mod MR, we upped speed from 5000 to 5100. Followed it up with RHC and Echo, RA was 11, W 14. We stopped diuresis for 2 days after RHC due to Cr that had gone up, after this Cr came back to normal. Today on d/c, Cr is 1.3 and will resume prior lasix 80 bid dose along with Kcl.   Afib is new onset (though strips from Jan 2022 also showed it but was never addressed) on dig now, will give referral for EP f/up as outpatient.  Upon discharge  -resume prior lasix 80 bid  -EP referral ordered   -Ophthalmology referral ordered  -prior coumadin dose (was booster yesterday, goal ~2 with Afib and SAH, traumatic two months ago)

## 2022-03-26 NOTE — PLAN OF CARE
Problem: Adult Inpatient Plan of Care  Goal: Plan of Care Review  Outcome: Ongoing, Progressing  Flowsheets   Plan of Care Reviewed With: patient  Goal: Patient-Specific Goal (Individualized)  Outcome: Ongoing, Progressing  Flowsheets   Individualized Care Needs: monitoring of LVAD DP and numbers, DLES dressing, Ac/HS cbg monitoring. monitor electrolytes.     Problem: Diabetes Comorbidity  Goal: Blood Glucose Level Within Targeted Range  Outcome: Ongoing, Progressing     Problem: Fall Injury Risk  Goal: Absence of Fall and Fall-Related Injury  Outcome: Ongoing, Progressing

## 2022-03-26 NOTE — PROGRESS NOTES
03/26/22 1341        VAD 01/13/21 1211 Left ventricular assist device HeartMate 3   Placement Date/Time: 01/13/21 1211   Present Prior to Hospital Arrival?: No  Inserted by: MD  VAD Type: Left ventricular assist device  VAD Brand: HeartMate 3   Equipment inventory at  Discharge   Pump type HeartMate3   Primary Controller [id number]   Extra Controller [id number]   Battery 3 [id number]   Battery 4 [id number]   Battery 7 [id number]   Battery 8 [id number]     Patient is ready for discharge. Patient stable alert and oriented. IVs and TELE removed. No complaints of pain. Discussed discharge plan. Reviewed medications and side effects, appointments, and answered questions with patient and family. __ RX given to patient @ bedside. Pt walked off unit.

## 2022-03-26 NOTE — ASSESSMENT & PLAN NOTE
RESOLVED  Cr normalized with holding diuresis  RHC obtained 3/24 after upping VAD speed by 100 on 3/23  RA: 16/ 16/ 11 RV: 30/ 10 PA: 35/ 16/ 22 PWP: 15/ 19/ 14 .   Cardiac output was 6.5  by Monse. Cardiac index is 3.1 L/min/m2.   Plan to resume lasix and KCl today

## 2022-03-26 NOTE — DISCHARGE SUMMARY
"[facility] - Cardiology Stepdown  Heart Transplant  Discharge Summary      Patient Name: Rocco France  MRN: [id number]  Admission Date: 3/22/2022  Hospital Length of Stay: 3 days  Discharge Date and Time: 03/26/2022 1:37 PM  Attending Physician: Deana Lake MD   Discharging Provider: Manuel Mario MD  Primary Care Provider: Jay Guardado DO     HPI: 65 yo BM with stage D CHF due to NICMP s/p HM3, s/p ICD, HTN, HLD, T2DM, h/o subdural/subarachnoid hemorrhage presents to ED with 3 days of dizzness and double vision. Reports feeling "woozy" and lightheaded since Sunday morning. He has also noticed double vision. He also reprots equilibrium issues while walking. His symptoms have been stable since they started. Dizziness is on and off and exacerbates on standing up. He denies any chest pain, falls, increasing SOB or LOC. He does feel his weight is up by "couple pounds". Yesterday he went to get bloodwork for his INR and when he got the call to discuss results, he was advised to present to ED. In the ER, he is volume up and will be brought in for IV diuresis and neurology evaluation.       Procedure(s) (LRB):  INSERTION, CATHETER, RIGHT HEART (Right)     Hospital Course: Pt came with neuro symptoms of dizziness, diplopia and disequilibiurm x 2-3 days. Neurology evaluated, CT head was negative, Ortho VS were also negative. Neuro wanted MRI but LVAD was the contraindication. They ordered myesthenia panel which is still in process at the time of discharge. His neuro symptoms have resolved. Neuro recd'd ophthalmology evaluation who came and recd'd outpatient f/up.   Upon arrival, he was in rapid afib and also BP towards the higher side needing addition of norvasc 10 and several IV hydralazine pushes. We loaded with 1 g IV dig and then started 125 mcg daily of digoxin. Currenlty, BP has been in 70s MAPs on cuff and afib is also rate controlled. Upon arrival, will provide with scripts of norvasc and dig.   He was " found to be volume up on arrivla and Echo had showed large LVIDD and mod MR, we upped speed from 5000 to 5100. Followed it up with RHC and Echo, RA was 11, W 14. We stopped diuresis for 2 days after RHC due to Cr that had gone up, after this Cr came back to normal. Today on d/c, Cr has normalized to 1.3 and will resume prior lasix 80 bid dose along with Kcl. Cr was normal on arrival at this home regimen and went up only after IV diuresis.   Afib is new onset (though strips from Jan 2022 also showed it but was never addressed) on dig now, will give referral for EP f/up as outpatient.  Upon discharge  -resume prior lasix 80 bid  -EP referral ordered   -Ophthalmology referral ordered  -prior coumadin dose (was booster yesterday, goal ~2 with Afib and SAH, traumatic two months ago)  -INR Tuesday (sent msg to [facility])    Goals of Care Treatment Preferences:  Code Status: Full Code    Health care agent: Meem France  [facility] care agent number: [phone]    Living Will: Yes              Consults (From admission, onward)        Status Ordering Provider     Inpatient consult to Ophthalmology  Once        Provider:  (Not yet assigned)    Completed KAREN PALMER     Inpatient consult to Neurology  Once        Provider:  (Not yet assigned)    Completed KAREN PALMER     Inpatient consult to Cardiology  Once        Provider:  (Not yet assigned)    Completed TUNG REYES          Significant Diagnostic Studies: Labs:   BMP:   Recent Labs   Lab 03/25/22  0532 03/26/22  0533   GLU 96 99   * 137   K 3.3* 3.7    105   CO2 26 26   BUN 24* 21   CREATININE 1.5* 1.3   CALCIUM 8.8 8.8   MG 2.1 2.1    and CMP   Recent Labs   Lab 03/25/22  0532 03/26/22  0533   * 137   K 3.3* 3.7    105   CO2 26 26   GLU 96 99   BUN 24* 21   CREATININE 1.5* 1.3   CALCIUM 8.8 8.8   PROT 7.5  --    ALBUMIN 2.2*  --    BILITOT 2.9*  --    ALKPHOS 170*  --    AST 57*  --    ALT 48*  --    ANIONGAP 7* 6*    ESTGFRAFRICA 55.3* >60.0   EGFRNONAA 47.8* 56.9*     Microbiology:   Blood Culture   Lab Results   Component Value Date    LABBLOO No growth after 5 days. 01/09/2021    and Sputum Culture No results found for: GSRESP, RESPIRATORYC    Pending Diagnostic Studies:     Procedure Component Value Units Date/Time    Myasthenia Gravis Eval With Musk Reflex [[id number]] Collected: 03/23/22 1418    Order Status: Sent Lab Status: In process Updated: 03/23/22 1429    Specimen: Blood         Final Active Diagnoses:    Diagnosis Date Noted POA    PRINCIPAL PROBLEM:  Dizziness [R42] 03/22/2022 Yes    CLARISSE (acute kidney injury) [N17.9] 01/11/2021 No    LVAD (left ventricular assist device) present [Z95.811] 01/13/2021 Not Applicable    Diplopia [H53.2] 03/22/2022 Unknown      Problems Resolved During this Admission:      Discharged Condition: stable    Disposition: Home or Self Care    Follow Up:    Patient Instructions:      Ambulatory referral/consult to Neurology   Standing Status: Future   Referral Priority: Routine Referral Type: Consultation   Referral Reason: Specialty Services Required   Requested Specialty: Neurology   Number of Visits Requested: 1     Ambulatory referral/consult to Electrophysiology   Standing Status: Future   Referral Priority: Routine Referral Type: Consultation   Referral Reason: Specialty Services Required   Requested Specialty: Electrophysiology   Number of Visits Requested: 1     Ambulatory referral/consult to Ophthalmology   Standing Status: Future   Referral Priority: Routine Referral Type: Consultation   Referral Reason: Specialty Services Required   Requested Specialty: Ophthalmology   Number of Visits Requested: 1     Medications:  Reconciled Home Medications:      Medication List      START taking these medications    amLODIPine 10 MG tablet  Commonly known as: NORVASC  Take 1 tablet (10 mg total) by mouth every evening.     digoxin 125 mcg tablet  Commonly known as: LANOXIN  Take 1 tablet  (0.125 mg total) by mouth once daily.  Start taking on: March 27, 2022        CONTINUE taking these medications    acetaminophen 500 mg Cap  Commonly known as: TYLENOL  Take 2 capsules (1,000 mg total) by mouth every 8 (eight) hours as needed (Pain).     atorvastatin 80 MG tablet  Commonly known as: LIPITOR  Take 1 tablet by mouth once daily     docusate sodium 100 MG capsule  Commonly known as: COLACE  Take 100 mg by mouth Daily.     furosemide 80 MG tablet  Commonly known as: LASIX  Take 1 tablet (80 mg total) by mouth 2 (two) times daily.     lisinopriL 20 MG tablet  Commonly known as: PRINIVIL,ZESTRIL  Take 1/2 tablet (10 mg total) by mouth every morning and take 1 tablet (20 mg total) every evening.     magnesium oxide 400 mg (241.3 mg magnesium) tablet  Commonly known as: MAG-OX  Take 1 tablet (400 mg total) by mouth 3 (three) times daily.     polyethylene glycol 17 gram Pwpk  Commonly known as: GLYCOLAX  Mix 1 packet (17 g) with liquid and take by mouth daily as needed.     SITagliptin 100 MG Tab  Commonly known as: JANUVIA  Take 1 tablet (100 mg total) by mouth once daily.     spironolactone 25 MG tablet  Commonly known as: ALDACTONE  Take 1 tablet (25 mg total) by mouth once daily.     warfarin 5 MG tablet PRIOR REGIMEN PER COUMADIN CLINIC   Commonly known as: COUMADIN  5 MG Thursday Saturday and 2.5 MG all other days            Manuel Mario MD  Heart Transplant  [facility]

## 2022-03-26 NOTE — ASSESSMENT & PLAN NOTE
Procedure: Device Interrogation Including analysis of device parameters  Current Settings: Ventricular Assist Device  Review of device function is stable  INR1.8, goal to be 2-3 with h/o afib (though he had SAH, traumatic in 1/2022), coumadin boost yesterday    TXP LVAD INTERROGATIONS 3/26/2022 3/26/2022 3/25/2022 3/25/2022 3/25/2022 3/25/2022 3/25/2022   Type HeartMate3 HeartMate3 HeartMate3 HeartMate3 HeartMate3 HeartMate3 HeartMate3   Flow 4.1 4.1 3.9 4.0 4.1 4.1 4.2   Speed 5100 5100 5100 5100 5100 5100 5100   PI 5.3 5.0 6.1 5.8 6.1 5.8 5.3   Power (Edwards) 3.5 3.5 3.5 3.5 3.5 3.5 3.5   LSL 4700 4700 4700 4700 - - 4700   Pulsatility - Pulse Pulse Pulse - - Pulse

## 2022-03-26 NOTE — PROGRESS NOTES
[facility] - Cardiology Stepdown  Heart Transplant  Progress Note    Patient Name: Rocco France  MRN: [id number]  Admission Date: 3/22/2022  Hospital Length of Stay: 3 days  Attending Physician: Deana Lake MD  Primary Care Provider: Jay Guardado DO  Principal Problem:<principal problem not specified>    Subjective:     Interval History:   -doing well, no acute issues, Cr normalized with diuretic break, will resume lasix and K today  -anticipate d/c today    Continuous Infusions:  Scheduled Meds:   amLODIPine  10 mg Oral QHS    atorvastatin  80 mg Oral Daily    digoxin  0.125 mg Oral Daily    docusate sodium  100 mg Oral Daily    lisinopriL  10 mg Oral Daily    lisinopriL  20 mg Oral QHS    spironolactone  25 mg Oral Daily    warfarin  2.5 mg Oral Once per day on Sun Tue Wed Fri Sat    warfarin  5 mg Oral Once per day on Mon Thu     PRN Meds:acetaminophen, dextrose 10%, dextrose 10%, glucagon (human recombinant), glucose, glucose, LIDOcaine-EPINEPHrine 1%-1:100,000, polyethylene glycol    Review of patient's allergies indicates:   Allergen Reactions    Pcn [penicillins] Hives     Objective:     Vital Signs (Most Recent):  Temp: 98.3 °F (36.8 °C) (03/25/22 1506)  Pulse: 94 (03/25/22 1600)  Resp: 18 (03/25/22 1506)  BP: (!) 76/0 (03/25/22 1506)  SpO2: 96 % (03/25/22 1506)   Vital Signs (24h Range):  Temp:  [97.2 °F (36.2 °C)-98.9 °F (37.2 °C)] 98.3 °F (36.8 °C)  Pulse:  [] 94  Resp:  [16-18] 18  SpO2:  [95 %-100 %] 96 %  BP: ()/(0-76) 76/0     Patient Vitals for the past 72 hrs (Last 3 readings):   Weight   03/25/22 0500 81.6 kg (179 lb 14.3 oz)   03/24/22 0515 82.8 kg (182 lb 8.7 oz)   03/23/22 1605 84.8 kg (187 lb)     Body mass index is 23.1 kg/m².      Intake/Output Summary (Last 24 hours) at 3/25/2022 1710  Last data filed at 3/25/2022 1300  Gross per 24 hour   Intake 1740 ml   Output 1210 ml   Net 530 ml       Hemodynamic Parameters:       Telemetry: rev'd    Physical  Exam  Constitutional:       Appearance: Normal appearance.   HENT:      Right Ear: Tympanic membrane normal.      Left Ear: Tympanic membrane normal.      Nose: Nose normal.   Eyes:      Extraocular Movements: Extraocular movements intact.   Cardiovascular:      Rate and Rhythm: afib, rate controlled  Pulmonary:      Effort: Pulmonary effort is normal.      Breath sounds: Normal breath sounds.   Abdominal:      Tenderness: There is no abdominal tenderness.   Musculoskeletal:         General: Normal range of motion.      Cervical back: Normal range of motion.   Skin:     General: Skin is warm.   Neurological:      General: No focal deficit present.      Mental Status: He is alert.   Psychiatric:         Mood and Affect: Mood normal.   Significant Labs:  CBC:  Recent Labs   Lab 03/23/22  0300 03/24/22  0402 03/25/22  0532   WBC 10.11 9.48 9.39   RBC 3.59* 3.66* 3.48*   HGB 9.8* 9.8* 9.5*   HCT 33.5* 32.3* 32.0*    187 194   MCV 93 88 92   MCH 27.3 26.8* 27.3   MCHC 29.3* 30.3* 29.7*     BNP:  Recent Labs   Lab 03/22/22  1035 03/25/22  0532   * 915*     CMP:  Recent Labs   Lab 03/22/22  1035 03/22/22  1751 03/23/22  0300 03/24/22  0403 03/25/22  0532   *  --  119* 92 96   CALCIUM 8.7  --  9.1 9.3 8.8   ALBUMIN 2.5*  --  2.5*  --  2.2*   PROT 8.0  --  7.9  --  7.5     --  139 138 134*   K 3.0*   < > 4.9 4.0 3.3*   CO2 29  --  25 26 26     --  103 103 101   BUN 18  --  22 24* 24*   CREATININE 1.4  --  1.6* 1.8* 1.5*   ALKPHOS 173*  --  181*  --  170*   ALT 24  --  28  --  48*   AST 36  --  42*  --  57*   BILITOT 2.7*  --  2.7*  --  2.9*    < > = values in this interval not displayed.      Coagulation:   Recent Labs   Lab 03/23/22  0300 03/24/22  0403 03/25/22  0532   INR 2.1* 1.9* 1.8*   APTT 33.6* 33.3* 33.3*     LDH:  Recent Labs   Lab 03/23/22  0300 03/24/22  0841   * 345*     Microbiology:  Microbiology Results (last 7 days)       ** No results found for the last 168 hours. **  "           I have reviewed all pertinent labs within the past 24 hours.    Estimated Creatinine Clearance: 55.9 mL/min (A) (based on SCr of 1.5 mg/dL (H)).    Diagnostic Results:  I have reviewed all pertinent imaging results/findings within the past 24 hours.    Assessment and Plan:     65 yo BM with stage D CHF due to NICMP s/p HM3, s/p ICD, HTN, HLD, T2DM, h/o subdural/subarachnoid hemorrhage presents to ED with 3 days of dizzness and double vision. Reports feeling "woozy" and lightheaded since Sunday morning. He has also noticed double vision. He also reprots equilibrium issues while walking. His symptoms have been stable since they started. Dizziness is on and off and exacerbates on standing up. He denies any chest pain, falls, increasing SOB or LOC. He does feel his weight is up by "couple pounds". Yesterday he went to get bloodwork for his INR and when he got the call to discuss results, he was advised to present to ED. In the ER, he is volume up and will be brought in for IV diuresis and neurology evaluation.       Dizziness  RESOLVED  Diplopia, diziness and disequilibrium x 2 days prior to arrival  No LOC, syncope, chest pain, dyspnea  CT head w/o contrast unremarkable for an acute pathology in the ER  Unable to obtain MRI   Consulted neurology who recd'd ophthalmology eval, ophthalmology recd'd outpt evaluation    CLARISSE (acute kidney injury)  RESOLVED  Cr normalized with holding diuresis  RHC obtained 3/24 after upping VAD speed by 100 on 3/23  RA: 16/ 16/ 11 RV: 30/ 10 PA: 35/ 16/ 22 PWP: 15/ 19/ 14 .   Cardiac output was 6.5  by Monse. Cardiac index is 3.1 L/min/m2.   Plan to resume lasix and KCl today       LVAD (left ventricular assist device) present  Procedure: Device Interrogation Including analysis of device parameters  Current Settings: Ventricular Assist Device  Review of device function is stable  INR1.8, goal to be 2-3 with h/o afib (though he had SAH, traumatic in 1/2022), coumadin boost " yesterday    TXP LVAD INTERROGATIONS 3/26/2022 3/26/2022 3/25/2022 3/25/2022 3/25/2022 3/25/2022 3/25/2022   Type HeartMate3 HeartMate3 HeartMate3 HeartMate3 HeartMate3 HeartMate3 HeartMate3   Flow 4.1 4.1 3.9 4.0 4.1 4.1 4.2   Speed 5100 5100 5100 5100 5100 5100 5100   PI 5.3 5.0 6.1 5.8 6.1 5.8 5.3   Power (Edwards) 3.5 3.5 3.5 3.5 3.5 3.5 3.5   LSL 4700 4700 4700 4700 - - 4700   Pulsatility - Pulse Pulse Pulse - - Pulse       Diplopia  Ophthalmology evaluated the patient on 3/23 and recd'd outpatient evaluation        Manuel Mario MD  Heart Transplant  [facility] - Cardiology Stepdown

## 2022-03-26 NOTE — PROGRESS NOTES
03/26/22 1218        VAD 01/13/21 1211 Left ventricular assist device HeartMate 3   Placement Date/Time: 01/13/21 1211   Present Prior to Hospital Arrival?: No  Inserted by: MD  VAD Type: Left ventricular assist device  VAD Brand: HeartMate 3   Site Location Abdomen right   Site Assessment Other (Comment)  (natalio; dressing in place)   Driveline Exit Site   (natalio)   Dressing Status Clean;Dry;Intact   Dressing Intervention Integrity maintained   Performed By Patient   Dressing Change Schedule Every other day   Dressing Change Due 03/28/22   Driveline [place] in use tape   Condition CDI

## 2022-03-26 NOTE — ASSESSMENT & PLAN NOTE
RESOLVED  Diplopia, diziness and disequilibrium x 2 days prior to arrival  No LOC, syncope, chest pain, dyspnea  CT head w/o contrast unremarkable for an acute pathology in the ER  Unable to obtain MRI   Consulted neurology who recd'd ophthalmology eval, ophthalmology recd'd outpt evaluation

## 2022-03-28 ENCOUNTER — PATIENT MESSAGE (OUTPATIENT)
Dept: ADMINISTRATIVE | Facility: CLINIC | Age: 67
End: 2022-03-28
Payer: MEDICARE

## 2022-03-28 ENCOUNTER — PATIENT OUTREACH (OUTPATIENT)
Dept: ADMINISTRATIVE | Facility: CLINIC | Age: 67
End: 2022-03-28
Payer: MEDICARE

## 2022-03-28 NOTE — PROGRESS NOTES
Pt confirmed hospital discharge of 5mg Thurs and Sat, 2.5mg all other days. He said RHC has been cancelled for 3/29 but he will still go to lab for INR and does not want to have HH draw.

## 2022-03-28 NOTE — PROGRESS NOTES
C3 nurse attempted to contact Rocco France for a TCC post hospital discharge follow up call. No answer. The patient does not have a scheduled HOSFU appointment, and the pt does not have an [facility] PCP.  Message sent via [facility] portal for Post Discharge Attempt.

## 2022-03-28 NOTE — PROGRESS NOTES
"Admitted 3/22-3/26/22. Hospital course per d/c summary: Pt came with neuro symptoms of dizziness, diplopia and disequilibiurm x 2-3 days. Neurology evaluated, CT head was negative, Ortho VS were also negative. Neuro wanted MRI but LVAD was the contraindication. They ordered myesthenia panel which is still in process at the time of discharge. His neuro symptoms have resolved. Neuro recd'd ophthalmology evaluation who came and recd'd outpatient f/up.   Upon arrival, he was in rapid afib and also BP towards the higher side needing addition of norvasc 10 and several IV hydralazine pushes. We loaded with 1 g IV dig and then started 125 mcg daily of digoxin. Currenlty, BP has been in 70s MAPs on cuff and afib is also rate controlled. Upon arrival, will provide with scripts of norvasc and dig.   He was found to be volume up on arrivla and Echo had showed large LVIDD and mod MR, we upped speed from 5000 to 5100. Followed it up with RHC and Echo, RA was 11, W 14. We stopped diuresis for 2 days after RHC due to Cr that had gone up, after this Cr came back to normal. Today on d/c, Cr has normalized to 1.3 and will resume prior lasix 80 bid dose along with Kcl. Cr was normal on arrival at this home regimen and went up only after IV diuresis.   Afib is new onset (though strips from Jan 2022 also showed it but was never addressed) on dig now, will give referral for EP f/up as outpatient.  Upon discharge  -resume prior lasix 80 bid  -EP referral ordered   -Ophthalmology referral ordered  -prior coumadin dose (was booster yesterday, goal ~2 with Afib and SAH, traumatic two months ago)  -INR Tuesday (sent msg to [facility])    Received the following message from MD Mario: "He has Afib now so another indication for LVAD. His SAH was traumatic per d/w Jared and Dr Lake. So looking at goal 2-2.5."    Calendar updated with warfarin dosing during admission. Will request INR tomorrow with HH.      "

## 2022-03-29 ENCOUNTER — ANTI-COAG VISIT (OUTPATIENT)
Dept: CARDIOLOGY | Facility: CLINIC | Age: 67
End: 2022-03-29
Payer: MEDICARE

## 2022-03-29 DIAGNOSIS — Z95.811 LVAD (LEFT VENTRICULAR ASSIST DEVICE) PRESENT: Primary | ICD-10-CM

## 2022-03-29 LAB
ACHR BIND AB SER-SCNC: 0 NMOL/L
EXT BUN: 16
EXT CALCIUM: 8
EXT CHLORIDE: 105
EXT CREATININE: 1.4 MG/DL
EXT GLUCOSE: 161
EXT MAGNESIUM: 2.3
EXT NT PROBNP: 5157
EXT POTASSIUM: 3.9
EXT SODIUM: 140 MMOL/L
INR PPP: 1.8
MG INTERPRETIVE COMMENTS: NORMAL

## 2022-03-29 PROCEDURE — 93793 PR ANTICOAGULANT MGMT FOR PT TAKING WARFARIN: ICD-10-PCS | Mod: S$GLB,,,

## 2022-03-29 PROCEDURE — 93793 ANTICOAG MGMT PT WARFARIN: CPT | Mod: S$GLB,,,

## 2022-03-29 NOTE — PHYSICIAN QUERY
PT Name: Rocco France  MR #: [id number]     DOCUMENTATION CLARIFICATION     CDS/: Hayley Maxwell RN, CCDS             Contact information: [email or web address]  This form is a permanent document in the medical record.     Query Date: March 29, 2022    By submitting this query, we are merely seeking further clarification of documentation.  Please utilize your independent clinical judgment when addressing the question(s) below.    The Medical Record contains the following   Indicators Supporting Clinical Findings Location in Medical Record   X Heart Failure documented Stage D HF,  s/p HM3 LVAD  Chronic combined systolic and diastolic congestive heart failure 3/22 h/p   X ,  915 3/22, 3/25 labs   X EF/Echo There is an LVAD present. Base speed is 5100 RPMs. The pump type is a Heartmate III. The aortic valve opens with each cardiac cycle.  The left ventricle is severely enlarged with severely decreased systolic function.  There is severe left ventricular global hypokinesis.  The estimated ejection fraction is 15%.  Left ventricular diastolic dysfunction.  Normal right ventricular size with normal right ventricular systolic function.  Mild mitral regurgitation.  Moderate tricuspid regurgitation.  Normal central venous pressure (3 mmHg).  The estimated PA systolic pressure is 35 mmHg.      3/23 echo    Radiology findings      Subjective/Objective Respiratory Conditions      Recent/Current MI     X Heart Transplant, LVAD  s/p HM3 LVAD 3/22 h/p   X Edema, JVD Volume up, JVD elevated, 3/22 prog note    Ascites     X Diuretics/Meds He is overloaded on exam, start diuresis with furosemide 80 mg IV TID    Lasix 80 mg IV q 8hr       Cr going up, at 1.8 today, will obtain RHC today and stop diuresis 3/22 prog note      Mar- Start: 03/22/22 1500 End: 03/24/22 1211  3/24 prog note    Other Treatment     X Other  In the ER, he is volume up and will be brought in for IV diuresis and neurology evaluation 3/22 h/p      Heart failure is a clinical diagnosis which includes symptomatic fluid retention, elevated intracardiac pressures, and/or the inability of the heart to deliver adequate blood flow.    Heart Failure with reduced Ejection Fraction (HFrEF) or Systolic Heart Failure (loses ability to contract normally, EF is <40%)    Heart Failure with preserved Ejection Fraction (HFpEF) or Diastolic Heart Failure (stiff ventricles, does not relax properly, EF is >50%)     Heart Failure with Combined Systolic and Diastolic Failure (stiff ventricles, does not relax properly and EF is <50%)    Mid-range or mildly reduced ejection fraction (HFmrEF) is classified as systolic heart failure.   Common clues to acute exacerbation:  Rapidly progressive symptoms (w/in 2 weeks of presentation), using IV diuretics, using supplemental O2, pulmonary edema on Xray, new or worsening pleural effusion, +JVD or other signs of volume overload, MI w/in 4 weeks, and/or BNP >500  The clinical guidelines noted are only system guidelines, and do not replace the providers clinical judgment.    Provider, please specify the diagnosis associated with the above clinical findings.    [ xx  ]  Acute on Chronic Combined Systolic and Diastolic Heart Failure - worsening of CHF signs/symptoms in preexisting CHF   [   ]  Chronic Combined Systolic and Diastolic Heart Failure - pre-existing and stable   [   ]  Other (please specify): ___________________________________   [  ]  Clinically Undetermined       Please document in your progress notes daily for the duration of treatment until resolved and include in your discharge summary.    References:  American Heart Association editorial staff. (2017, May). Ejection Fraction Heart Failure Measurement. American Heart Association.  https://www.heart.org/en/health-topics/heart-failure/diagnosing-heart-failure/ejection-fraction-heart-failure-measurement#:~:text=Ejection%20fraction%20(EF)%20is%20a,pushed%20out%20with%20each%20heartbeat  KIMI Montero (2020, December 15). Heart failure with preserved ejection fraction: Clinical manifestations and diagnosis. [employer]. https://www.[employer].Arcarios/contents/heart-failure-with-preserved-ejection-fraction-clinical-manifestations-and-diagnosis.  ICD-10-CM/PCS Coding Clinic Third Quarter ICD-10, Effective with discharges: September 8, 2020 [facility] [employer] § Heart failure with mid-range or mildly reduced ejection fraction (2020).  Form No. 98765

## 2022-03-29 NOTE — PROGRESS NOTES
C3 nurse attempted to contact Rocco France for a TCC post hospital discharge follow up call. No answer. No voicemail available - has not been set up.  Spouse states that patient is at the hospital now getting labs done.  The patient has a scheduled appointment with MD Aleksandra on 5/11/2022 @ [id number] .

## 2022-03-30 ENCOUNTER — TELEPHONE (OUTPATIENT)
Dept: TRANSPLANT | Facility: CLINIC | Age: 67
End: 2022-03-30
Payer: MEDICARE

## 2022-03-30 ENCOUNTER — TELEPHONE (OUTPATIENT)
Dept: NEUROLOGY | Facility: CLINIC | Age: 67
End: 2022-03-30
Payer: MEDICARE

## 2022-03-31 ENCOUNTER — ANTI-COAG VISIT (OUTPATIENT)
Dept: CARDIOLOGY | Facility: CLINIC | Age: 67
End: 2022-03-31
Payer: MEDICARE

## 2022-03-31 DIAGNOSIS — Z95.811 LVAD (LEFT VENTRICULAR ASSIST DEVICE) PRESENT: Primary | ICD-10-CM

## 2022-03-31 LAB — INR PPP: 1.8

## 2022-03-31 PROCEDURE — 93793 PR ANTICOAGULANT MGMT FOR PT TAKING WARFARIN: ICD-10-PCS | Mod: S$GLB,,,

## 2022-03-31 PROCEDURE — 93793 ANTICOAG MGMT PT WARFARIN: CPT | Mod: S$GLB,,,

## 2022-03-31 NOTE — PROGRESS NOTES
Wife questioned and confirmed dose - reports eating less than usual since being d/c from hospital .  No other changes reported

## 2022-04-01 DIAGNOSIS — I48.91 ATRIAL FIBRILLATION, UNSPECIFIED TYPE: Primary | ICD-10-CM

## 2022-04-04 ENCOUNTER — ANTI-COAG VISIT (OUTPATIENT)
Dept: CARDIOLOGY | Facility: CLINIC | Age: 67
End: 2022-04-04
Payer: MEDICARE

## 2022-04-04 DIAGNOSIS — Z95.811 LVAD (LEFT VENTRICULAR ASSIST DEVICE) PRESENT: Primary | ICD-10-CM

## 2022-04-04 LAB — INR PPP: 1.9

## 2022-04-04 PROCEDURE — 93793 ANTICOAG MGMT PT WARFARIN: CPT | Mod: S$GLB,,,

## 2022-04-04 PROCEDURE — 93793 PR ANTICOAGULANT MGMT FOR PT TAKING WARFARIN: ICD-10-PCS | Mod: S$GLB,,,

## 2022-04-06 NOTE — PROGRESS NOTES
[facility] called 4/6/22 to feli appt 04/06/22 to 04/07/22. Patient is out of town

## 2022-04-07 ENCOUNTER — ANTI-COAG VISIT (OUTPATIENT)
Dept: CARDIOLOGY | Facility: CLINIC | Age: 67
End: 2022-04-07
Payer: MEDICARE

## 2022-04-07 DIAGNOSIS — Z95.811 LVAD (LEFT VENTRICULAR ASSIST DEVICE) PRESENT: Primary | ICD-10-CM

## 2022-04-07 LAB — INR PPP: 2.4

## 2022-04-07 PROCEDURE — 93793 PR ANTICOAGULANT MGMT FOR PT TAKING WARFARIN: ICD-10-PCS | Mod: S$GLB,,,

## 2022-04-07 PROCEDURE — 93793 ANTICOAG MGMT PT WARFARIN: CPT | Mod: S$GLB,,,

## 2022-04-11 ENCOUNTER — TELEPHONE (OUTPATIENT)
Dept: TRANSPLANT | Facility: CLINIC | Age: 67
End: 2022-04-11
Payer: MEDICARE

## 2022-04-11 ENCOUNTER — ANTI-COAG VISIT (OUTPATIENT)
Dept: CARDIOLOGY | Facility: CLINIC | Age: 67
End: 2022-04-11
Payer: MEDICARE

## 2022-04-11 DIAGNOSIS — Z95.811 LVAD (LEFT VENTRICULAR ASSIST DEVICE) PRESENT: Primary | ICD-10-CM

## 2022-04-11 LAB — INR PPP: 4.5

## 2022-04-11 PROCEDURE — 93793 ANTICOAG MGMT PT WARFARIN: CPT | Mod: S$GLB,,,

## 2022-04-11 PROCEDURE — 93793 PR ANTICOAGULANT MGMT FOR PT TAKING WARFARIN: ICD-10-PCS | Mod: S$GLB,,,

## 2022-04-11 NOTE — PROGRESS NOTES
INR not at goal. Medications, chart, and patient findings reviewed. See calendar for adjustments to dose and follow up plan. Dr. Lake notified of findings &  plan.

## 2022-04-11 NOTE — PROGRESS NOTES
Wife questioned and confirmed dose .  Reports fluid retention  since the Saturday and has taken furosemide (LASIX). Appetite is gradually increasing no other changes reported.

## 2022-04-12 ENCOUNTER — ANTI-COAG VISIT (OUTPATIENT)
Dept: CARDIOLOGY | Facility: CLINIC | Age: 67
End: 2022-04-12
Payer: MEDICARE

## 2022-04-12 DIAGNOSIS — Z95.811 LVAD (LEFT VENTRICULAR ASSIST DEVICE) PRESENT: Primary | ICD-10-CM

## 2022-04-12 LAB — INR PPP: 3.6

## 2022-04-12 PROCEDURE — 93793 ANTICOAG MGMT PT WARFARIN: CPT | Mod: S$GLB,,,

## 2022-04-12 PROCEDURE — 93793 PR ANTICOAGULANT MGMT FOR PT TAKING WARFARIN: ICD-10-PCS | Mod: S$GLB,,,

## 2022-04-14 ENCOUNTER — ANTI-COAG VISIT (OUTPATIENT)
Dept: CARDIOLOGY | Facility: CLINIC | Age: 67
End: 2022-04-14
Payer: MEDICARE

## 2022-04-14 DIAGNOSIS — Z95.811 LVAD (LEFT VENTRICULAR ASSIST DEVICE) PRESENT: Primary | ICD-10-CM

## 2022-04-14 LAB — INR PPP: 1.9

## 2022-04-18 ENCOUNTER — ANTI-COAG VISIT (OUTPATIENT)
Dept: CARDIOLOGY | Facility: CLINIC | Age: 67
End: 2022-04-18
Payer: MEDICARE

## 2022-04-18 ENCOUNTER — PATIENT MESSAGE (OUTPATIENT)
Dept: CARDIOLOGY | Facility: CLINIC | Age: 67
End: 2022-04-18

## 2022-04-18 DIAGNOSIS — Z95.811 LVAD (LEFT VENTRICULAR ASSIST DEVICE) PRESENT: Primary | ICD-10-CM

## 2022-04-18 LAB
INR PPP: 1.9
MUSK ANTIBODY TEST: 0 NMOL/L (ref 0–0.02)

## 2022-04-18 PROCEDURE — 93793 PR ANTICOAGULANT MGMT FOR PT TAKING WARFARIN: ICD-10-PCS | Mod: S$GLB,,,

## 2022-04-18 PROCEDURE — 93793 ANTICOAG MGMT PT WARFARIN: CPT | Mod: S$GLB,,,

## 2022-04-21 ENCOUNTER — LAB VISIT (OUTPATIENT)
Dept: LAB | Facility: HOSPITAL | Age: 67
End: 2022-04-21
Payer: MEDICARE

## 2022-04-21 ENCOUNTER — OFFICE VISIT (OUTPATIENT)
Dept: NEUROLOGY | Facility: CLINIC | Age: 67
End: 2022-04-21
Payer: MEDICARE

## 2022-04-21 ENCOUNTER — ANTI-COAG VISIT (OUTPATIENT)
Dept: CARDIOLOGY | Facility: CLINIC | Age: 67
End: 2022-04-21
Payer: MEDICARE

## 2022-04-21 VITALS
BODY MASS INDEX: 27.97 KG/M2 | SYSTOLIC BLOOD PRESSURE: 95 MMHG | HEIGHT: 73 IN | WEIGHT: 211.06 LBS | HEART RATE: 107 BPM | DIASTOLIC BLOOD PRESSURE: 72 MMHG

## 2022-04-21 DIAGNOSIS — Z95.811 LVAD (LEFT VENTRICULAR ASSIST DEVICE) PRESENT: Primary | ICD-10-CM

## 2022-04-21 DIAGNOSIS — I48.91 ATRIAL FIBRILLATION, UNSPECIFIED TYPE: ICD-10-CM

## 2022-04-21 DIAGNOSIS — S06.5XAA BILATERAL SUBDURAL HEMATOMAS: ICD-10-CM

## 2022-04-21 DIAGNOSIS — I10 ESSENTIAL HYPERTENSION: ICD-10-CM

## 2022-04-21 DIAGNOSIS — R42 DIZZINESS: ICD-10-CM

## 2022-04-21 DIAGNOSIS — Z95.811 HEART REPLACED BY HEART ASSIST DEVICE: ICD-10-CM

## 2022-04-21 DIAGNOSIS — E78.2 MIXED HYPERLIPIDEMIA: ICD-10-CM

## 2022-04-21 DIAGNOSIS — H53.2 DIPLOPIA: ICD-10-CM

## 2022-04-21 LAB
INR PPP: 2.9 (ref 0.8–1.2)
PROTHROMBIN TIME: 28.7 SEC (ref 9–12.5)

## 2022-04-21 PROCEDURE — 1111F DSCHRG MED/CURRENT MED MERGE: CPT | Mod: CPTII,GC,S$GLB, | Performed by: STUDENT IN AN ORGANIZED HEALTH CARE EDUCATION/TRAINING PROGRAM

## 2022-04-21 PROCEDURE — 85610 PROTHROMBIN TIME: CPT | Performed by: INTERNAL MEDICINE

## 2022-04-21 PROCEDURE — 93793 ANTICOAG MGMT PT WARFARIN: CPT | Mod: S$GLB,,,

## 2022-04-21 PROCEDURE — 1111F PR DISCHARGE MEDS RECONCILED W/ CURRENT OUTPATIENT MED LIST: ICD-10-PCS | Mod: CPTII,GC,S$GLB, | Performed by: STUDENT IN AN ORGANIZED HEALTH CARE EDUCATION/TRAINING PROGRAM

## 2022-04-21 PROCEDURE — 99215 OFFICE O/P EST HI 40 MIN: CPT | Mod: GC,S$GLB,, | Performed by: STUDENT IN AN ORGANIZED HEALTH CARE EDUCATION/TRAINING PROGRAM

## 2022-04-21 PROCEDURE — 99215 PR OFFICE/OUTPT VISIT, EST, LEVL V, 40-54 MIN: ICD-10-PCS | Mod: GC,S$GLB,, | Performed by: STUDENT IN AN ORGANIZED HEALTH CARE EDUCATION/TRAINING PROGRAM

## 2022-04-21 PROCEDURE — 93793 PR ANTICOAGULANT MGMT FOR PT TAKING WARFARIN: ICD-10-PCS | Mod: S$GLB,,,

## 2022-04-21 PROCEDURE — 36415 COLL VENOUS BLD VENIPUNCTURE: CPT | Performed by: INTERNAL MEDICINE

## 2022-04-21 PROCEDURE — 99999 PR PBB SHADOW E&M-EST. PATIENT-LVL III: ICD-10-PCS | Mod: PBBFAC,GC,, | Performed by: STUDENT IN AN ORGANIZED HEALTH CARE EDUCATION/TRAINING PROGRAM

## 2022-04-21 PROCEDURE — 99999 PR PBB SHADOW E&M-EST. PATIENT-LVL III: CPT | Mod: PBBFAC,GC,, | Performed by: STUDENT IN AN ORGANIZED HEALTH CARE EDUCATION/TRAINING PROGRAM

## 2022-04-21 NOTE — PROGRESS NOTES
[facility] [address]  [facility], [place]    Date: 4/21/22  Patient Name: Rocco France   MRN: [id number]   PCP: Jay Guardado  Referring Provider: Danny Guzmán MD    Assessment:   Rocco France is a 66 y.o. male with an extensive cardiac and vascular history (please see HPI) presents for follow up of a three day history of dizziness and diplopia that has now resolved.      His initial symptoms were suspicious for a infarct most likely in the cerebellar with resolution of the stroke. At that time we could not rule out hypoperfusion spells.   Plan:   - No need for repeat imaging as symptoms resolved.  -Discussed secondary stroke prevention  Stroke Risk Factors Addressed:  · Hypertension - goal BP < 130/80 mmHg [continue current medical therapy]  · Hyperlipidemia - goal LDL [< 100], continue current medical therapy, stable  · Diabetes Mellitus - goal A1c < 7, continue current therapy  · Atrial Fibrillation - continue anticoagulation  -Follow up as a PRN     Problem List Items Addressed This Visit        Neuro    Bilateral subdural hematomas    Current Assessment & Plan     Traumatic SDH with SAH after fall on 1/27  Currently on warfarin due to his LVAD and atrial fibrillation               Ophtho    Diplopia    Current Assessment & Plan     Resolved after obtaining bifocals               Cardiac/Vascular    Essential hypertension    Current Assessment & Plan     Stroke RF   Continue lisinopril and furosemide            Hyperlipidemia    Current Assessment & Plan     Stroke risk factor   Continue atorvastatin 80 mg            Atrial fibrillation    Current Assessment & Plan     Stroke risk factor   Continue warfarin--> being managed by cardiology given his LVAD               Other    Dizziness    Current Assessment & Plan     Resolved   Most likely symptoms are due to a cerebellar infarct with resolution                  Luis Carlos Alicea MD    Patient note was created  using MModal Dictation.  Any errors in syntax or even information may not have been identified and edited on initial review prior to signing this note.  Subjective:        HPI:   Mr. Rocco France is a 66 y.o. male with a medical history of a prior subdural hematoma with subarrachnoid hemorrhage (2/2022), CHF (nonischemic cardiomyopathy) s/p LVAD (1/2021) , newly diagnosed atrial fibrillation (on coumadin), HTN, HLD, and DM2 presents as a hospital follow up for dizziness and diplopia. His symptoms have currently all resolved.     Mr. France was admitted to the hospital on 3/22 with complaints of diplopia, dizziness and lightheadness. Symptoms abruptly began three days prior to admission. His diplopia was intermittent and would occur while sitting and standing (me prominent while standing). He also complained of dizziness (as though the room was spinning). His symptoms would wax and wane with no particular alleviating or exacerbating factors. It did not worsen or get better with position. On arrival to the ED, he got a CTH and CTA as his LVAD is not MRI compatible which did not show any acute infarct. A myasthenia gravis panel was obtained which was negative. He was found to be in rapid atrial fibrillation upon admission. He was loaded with digoxin and given several pushes of hydralazine.     Currently denies dizziness. Patient did had intermittent double vision after discharge however, that resolved after he got glasses (bifocals)     PAST MEDICAL HISTORY:  Past Medical History:   Diagnosis Date    CLARISSE (acute kidney injury) 1/11/2021    CHF (congestive heart failure)     Chronic combined systolic and diastolic congestive heart failure 1/9/2021    Coronary artery disease     Diabetes mellitus     Hypertension     Kidney stones        PAST SURGICAL HISTORY:  Past Surgical History:   Procedure Laterality Date    CARDIAC CATHETERIZATION  2010    no stents    CARDIAC DEFIBRILLATOR PLACEMENT  2010    CARDIAC  DEFIBRILLATOR PLACEMENT      HERNIA REPAIR      IRRIGATION OF MEDIASTINUM N/A 1/14/2021    Procedure: IRRIGATION, MEDIASTINUM;  Surgeon: Zenon Corona MD;  Location: [facility] OR 2ND FLR;  Service: Cardiovascular;  Laterality: N/A;    KNEE ARTHROSCOPY Right     LEFT VENTRICULAR ASSIST DEVICE Left 1/13/2021    Procedure: INSERTION- HeartMate 3 LEFT VENTRICULAR ASSIST DEVICE ;  Surgeon: Zenon Corona MD;  Location: [facility] OR 2ND FLR;  Service: Cardiovascular;  Laterality: Left;    RECONSTRUCTION OF PERICARDIUM N/A 1/14/2021    Procedure: RECONSTRUCTION, PERICARDIUM;  Surgeon: Zenon Corona MD;  Location: [facility] OR 2ND FLR;  Service: Cardiovascular;  Laterality: N/A;    RIGHT HEART CATHETERIZATION Right 11/2/2020    Procedure: INSERTION, CATHETER, RIGHT HEART;  Surgeon: Deana Lake MD;  Location: [facility] CATH LAB;  Service: Cardiology;  Laterality: Right;    RIGHT HEART CATHETERIZATION Right 3/24/2022    Procedure: INSERTION, CATHETER, RIGHT HEART;  Surgeon: Manuel Ramos Jr., MD;  Location: [facility] CATH LAB;  Service: Cardiology;  Laterality: Right;    STERNAL WOUND CLOSURE  1/13/2021    Procedure: TEMPORARY CLOSURE OF CHEST;  Surgeon: Zenon Corona MD;  Location: [facility] OR [facility] FLR;  Service: Cardiovascular;;    STERNAL WOUND CLOSURE N/A 1/14/2021    Procedure: CLOSURE, WOUND, STERNUM;  Surgeon: Zenon Corona MD;  Location: [facility] OR 2ND FLR;  Service: Cardiovascular;  Laterality: N/A;       CURRENT MEDS:  Current Outpatient Medications   Medication Sig Dispense Refill    acetaminophen (TYLENOL) 500 mg Cap Take 2 capsules (1,000 mg total) by mouth every 8 (eight) hours as needed (Pain).  0    amLODIPine (NORVASC) 10 MG tablet Take 1 tablet (10 mg total) by mouth every evening. 90 tablet 3    atorvastatin (LIPITOR) 80 MG tablet Take 1 tablet by mouth once daily 90 tablet 0    digoxin (LANOXIN) 125 mcg tablet Take 1 tablet (0.125 mg total) by mouth once daily. 90 tablet 0    docusate sodium (COLACE) 100 MG  "capsule Take 100 mg by mouth Daily.      furosemide (LASIX) 80 MG tablet Take 1 tablet (80 mg total) by mouth 2 (two) times daily. 60 tablet 11    lisinopriL (PRINIVIL,ZESTRIL) 20 MG tablet Take 1/2 tablet (10 mg total) by mouth every morning and take 1 tablet (20 mg total) every evening. 90 tablet 2    magnesium oxide (MAG-OX) 400 mg (241.3 mg magnesium) tablet Take 1 tablet (400 mg total) by mouth 3 (three) times daily. 90 tablet 11    SITagliptin (JANUVIA) 100 MG Tab Take 1 tablet (100 mg total) by mouth once daily. 90 tablet 3    warfarin (COUMADIN) 5 MG tablet Take 1 tablet (5 mg total) by mouth Daily. 90 tablet 3    polyethylene glycol (GLYCOLAX) 17 gram PwPk Mix 1 packet (17 g) with liquid and take by mouth daily as needed. (Patient not taking: Reported on 4/21/2022) 10 packet 0    spironolactone (ALDACTONE) 25 MG tablet Take 1 tablet (25 mg total) by mouth once daily. (Patient not taking: Reported on 4/21/2022) 30 tablet 11     No current facility-administered medications for this visit.       ALLERGIES:  Review of patient's allergies indicates:   Allergen Reactions    Pcn [penicillins] Hives       FAMILY HISTORY:  Family History   Problem Relation Age of Onset    Heart attack Mother     Heart failure Brother     Heart attack Brother     Hypertension Brother        SOCIAL HISTORY:  Social History     Tobacco Use    Smoking status: Current Some Day Smoker     Types: Cigarettes    Smokeless tobacco: Never Used   Substance Use Topics    Alcohol use: No       Review of Systems:  12 system review of systems is negative except for the symptoms mentioned in HPI.      Objective:     Vitals:    04/21/22 0847   BP: 95/72   BP Location: Left arm   Patient Position: Sitting   BP Method: Large (Automatic)   Pulse: 107   Weight: 95.8 kg (211 lb 1.5 oz)   Height: 6' 1" (1.854 m)     General: NAD, well nourished   Eyes: no tearing, discharge, no erythema   ENT: moist mucous membranes of the oral cavity, nares " patent    Neck: Supple, full range of motion  Cardiovascular: Warm and well perfused, pulses equal and symmetrical  Lungs: Normal work of breathing, normal chest wall excursions  Skin: No rash, lesions, or breakdown on exposed skin  Psychiatry: Mood and affect are appropriate   Abdomen: soft, non tender, non distended  Extremeties: No cyanosis, clubbing or edema.    Neurological   MENTAL STATUS: Alert and oriented to person, place, and time. Attention and concentration within normal limits. Speech without dysarthria, able to name and repeat without difficulty. Recent and remote memory within normal limits   CRANIAL NERVES: Visual fields intact. PERRL. EOMI. Facial sensation intact. Face symmetrical. Hearing grossly intact. Full shoulder shrug bilaterally. Tongue protrudes midline   SENSORY: Sensation is intact to light touch throughout.  Joint position perception intact. Negative Romberg.   MOTOR: Normal bulk and tone. No pronator drift.  5/5 deltoid, biceps, triceps, interosseous, hand  bilaterally. 5/5 iliopsoas, knee extension/flexion, foot dorsi/plantarflexion bilaterally.    REFLEXES: Symmetric and 2+ throughout. Toes down going bilaterally.   CEREBELLAR/COORDINATION/GAIT: Gait steady with normal arm swing and stride length.  Heel to shin intact. Finger to nose intact. Normal rapid alternating movements.       Images:  CTH and CTA did not show any acute intracranial abnormalities: Per independent resident review and interpretation,

## 2022-04-21 NOTE — ASSESSMENT & PLAN NOTE
Traumatic SDH with SAH after fall on 1/27  Currently on warfarin due to his LVAD and atrial fibrillation

## 2022-04-25 ENCOUNTER — ANTI-COAG VISIT (OUTPATIENT)
Dept: CARDIOLOGY | Facility: CLINIC | Age: 67
End: 2022-04-25
Payer: MEDICARE

## 2022-04-25 DIAGNOSIS — Z95.811 LVAD (LEFT VENTRICULAR ASSIST DEVICE) PRESENT: Primary | ICD-10-CM

## 2022-04-25 LAB — INR PPP: 2.6

## 2022-04-25 PROCEDURE — 93793 PR ANTICOAGULANT MGMT FOR PT TAKING WARFARIN: ICD-10-PCS | Mod: S$GLB,,,

## 2022-04-25 PROCEDURE — 93793 ANTICOAG MGMT PT WARFARIN: CPT | Mod: S$GLB,,,

## 2022-04-28 ENCOUNTER — ANTI-COAG VISIT (OUTPATIENT)
Dept: CARDIOLOGY | Facility: CLINIC | Age: 67
End: 2022-04-28
Payer: MEDICARE

## 2022-04-28 DIAGNOSIS — Z95.811 LVAD (LEFT VENTRICULAR ASSIST DEVICE) PRESENT: Primary | ICD-10-CM

## 2022-04-28 LAB — INR PPP: 2.6

## 2022-04-28 PROCEDURE — 93793 ANTICOAG MGMT PT WARFARIN: CPT | Mod: S$GLB,,,

## 2022-04-28 PROCEDURE — 93793 PR ANTICOAGULANT MGMT FOR PT TAKING WARFARIN: ICD-10-PCS | Mod: S$GLB,,,

## 2022-04-28 NOTE — PROGRESS NOTES
Wife questioned and confirmed dose . Reports patient appetite is low  May eat one or two meals per day no other changes.

## 2022-05-03 ENCOUNTER — ANTI-COAG VISIT (OUTPATIENT)
Dept: CARDIOLOGY | Facility: CLINIC | Age: 67
End: 2022-05-03
Payer: MEDICARE

## 2022-05-03 DIAGNOSIS — Z95.811 LVAD (LEFT VENTRICULAR ASSIST DEVICE) PRESENT: Primary | ICD-10-CM

## 2022-05-03 LAB — INR PPP: 2.3

## 2022-05-03 PROCEDURE — 93793 PR ANTICOAGULANT MGMT FOR PT TAKING WARFARIN: ICD-10-PCS | Mod: S$GLB,,,

## 2022-05-03 PROCEDURE — 93793 ANTICOAG MGMT PT WARFARIN: CPT | Mod: S$GLB,,,

## 2022-05-03 NOTE — PROGRESS NOTES
Patient called and was advised of coumadin instructions and redraw date, order was faxed to [facility] [address]

## 2022-05-05 ENCOUNTER — ANTI-COAG VISIT (OUTPATIENT)
Dept: CARDIOLOGY | Facility: CLINIC | Age: 67
End: 2022-05-05
Payer: MEDICARE

## 2022-05-05 ENCOUNTER — TELEPHONE (OUTPATIENT)
Dept: TRANSPLANT | Facility: CLINIC | Age: 67
End: 2022-05-05
Payer: MEDICARE

## 2022-05-05 DIAGNOSIS — Z95.811 LVAD (LEFT VENTRICULAR ASSIST DEVICE) PRESENT: Primary | ICD-10-CM

## 2022-05-05 LAB — INR PPP: 2.1

## 2022-05-05 PROCEDURE — 93793 ANTICOAG MGMT PT WARFARIN: CPT | Mod: S$GLB,,,

## 2022-05-05 PROCEDURE — 93793 PR ANTICOAGULANT MGMT FOR PT TAKING WARFARIN: ICD-10-PCS | Mod: S$GLB,,,

## 2022-05-05 NOTE — TELEPHONE ENCOUNTER
Received a call from Sarah from [facility] they are awaiting signed hh orders    Some orders are over 40 days old    Informed them of procedure and will reach out to Dr Jose Baker  to reach out to them regarding the overdue hh orders      Sent message to Sandra Baker .     This information will be relayed to VAD coordinator.

## 2022-05-09 ENCOUNTER — ANTI-COAG VISIT (OUTPATIENT)
Dept: CARDIOLOGY | Facility: CLINIC | Age: 67
End: 2022-05-09
Payer: MEDICARE

## 2022-05-09 DIAGNOSIS — Z95.811 LVAD (LEFT VENTRICULAR ASSIST DEVICE) PRESENT: Primary | ICD-10-CM

## 2022-05-09 LAB — INR PPP: 2.5

## 2022-05-09 PROCEDURE — 93793 ANTICOAG MGMT PT WARFARIN: CPT | Mod: S$GLB,,,

## 2022-05-09 PROCEDURE — 93793 PR ANTICOAGULANT MGMT FOR PT TAKING WARFARIN: ICD-10-PCS | Mod: S$GLB,,,

## 2022-05-11 ENCOUNTER — ANTI-COAG VISIT (OUTPATIENT)
Dept: CARDIOLOGY | Facility: CLINIC | Age: 67
End: 2022-05-11
Payer: MEDICARE

## 2022-05-11 ENCOUNTER — HOSPITAL ENCOUNTER (OUTPATIENT)
Dept: CARDIOLOGY | Facility: HOSPITAL | Age: 67
Discharge: HOME | End: 2022-05-11
Attending: INTERNAL MEDICINE
Payer: MEDICARE

## 2022-05-11 ENCOUNTER — OFFICE VISIT (OUTPATIENT)
Dept: TRANSPLANT | Facility: CLINIC | Age: 67
End: 2022-05-11
Payer: MEDICARE

## 2022-05-11 ENCOUNTER — CLINICAL SUPPORT (OUTPATIENT)
Dept: TRANSPLANT | Facility: CLINIC | Age: 67
End: 2022-05-11
Payer: MEDICARE

## 2022-05-11 VITALS
WEIGHT: 211 LBS | BODY MASS INDEX: 25.67 KG/M2 | HEART RATE: 100 BPM | SYSTOLIC BLOOD PRESSURE: 85 MMHG | HEIGHT: 73 IN | HEART RATE: 101 BPM | HEIGHT: 74 IN | BODY MASS INDEX: 27.96 KG/M2 | TEMPERATURE: 98 F | WEIGHT: 200 LBS

## 2022-05-11 DIAGNOSIS — Z79.01 ANTICOAGULATED: ICD-10-CM

## 2022-05-11 DIAGNOSIS — Z01.89 NEEDS SLEEP APNEA ASSESSMENT: ICD-10-CM

## 2022-05-11 DIAGNOSIS — Z95.811 LVAD (LEFT VENTRICULAR ASSIST DEVICE) PRESENT: Primary | ICD-10-CM

## 2022-05-11 DIAGNOSIS — Z95.811 LVAD (LEFT VENTRICULAR ASSIST DEVICE) PRESENT: ICD-10-CM

## 2022-05-11 DIAGNOSIS — I42.8 NICM (NONISCHEMIC CARDIOMYOPATHY): ICD-10-CM

## 2022-05-11 DIAGNOSIS — I10 ESSENTIAL HYPERTENSION: ICD-10-CM

## 2022-05-11 DIAGNOSIS — Z95.811 HEART REPLACED BY HEART ASSIST DEVICE: Primary | ICD-10-CM

## 2022-05-11 DIAGNOSIS — I48.91 ATRIAL FIBRILLATION, UNSPECIFIED TYPE: ICD-10-CM

## 2022-05-11 DIAGNOSIS — Z95.811 HEART REPLACED BY HEART ASSIST DEVICE: ICD-10-CM

## 2022-05-11 LAB
ASCENDING AORTA: 3.65 CM
BSA FOR ECHO PROCEDURE: 2.22 M2
CV ECHO LV RWT: 0.28 CM
DOP CALC LVOT AREA: 4.2 CM2
DOP CALC LVOT DIAMETER: 2.3 CM
ECHO LV POSTERIOR WALL: 0.96 CM (ref 0.6–1.1)
EJECTION FRACTION: 15 %
FRACTIONAL SHORTENING: 9 % (ref 28–44)
INTERVENTRICULAR SEPTUM: 0.65 CM (ref 0.6–1.1)
LA MAJOR: 5.25 CM
LA MINOR: 5.3 CM
LA WIDTH: 4.76 CM
LEFT ATRIUM SIZE: 3.91 CM
LEFT ATRIUM VOLUME INDEX MOD: 33.6 ML/M2
LEFT ATRIUM VOLUME INDEX: 37.9 ML/M2
LEFT ATRIUM VOLUME MOD: 73.88 CM3
LEFT ATRIUM VOLUME: 83.45 CM3
LEFT INTERNAL DIMENSION IN SYSTOLE: 6.27 CM (ref 2.1–4)
LEFT VENTRICLE DIASTOLIC VOLUME INDEX: 111.17 ML/M2
LEFT VENTRICLE DIASTOLIC VOLUME: 244.58 ML
LEFT VENTRICLE MASS INDEX: 108 G/M2
LEFT VENTRICLE SYSTOLIC VOLUME INDEX: 90.4 ML/M2
LEFT VENTRICLE SYSTOLIC VOLUME: 198.8 ML
LEFT VENTRICULAR INTERNAL DIMENSION IN DIASTOLE: 6.87 CM (ref 3.5–6)
LEFT VENTRICULAR MASS: 238.18 G
LV LATERAL E/E' RATIO: 13.43 M/S
MV PEAK E VEL: 0.94 M/S
PISA TR MAX VEL: 2.09 M/S
RA MAJOR: 5.01 CM
RA PRESSURE: 15 MMHG
RA WIDTH: 4.8 CM
RIGHT VENTRICULAR END-DIASTOLIC DIMENSION: 4.27 CM
RV TISSUE DOPPLER FREE WALL SYSTOLIC VELOCITY 1 (APICAL 4 CHAMBER VIEW): 3.36 CM/S
SINUS: 3.62 CM
STJ: 2.83 CM
TDI LATERAL: 0.07 M/S
TR MAX PG: 17 MMHG
TRICUSPID ANNULAR PLANE SYSTOLIC EXCURSION: 0.94 CM
TV REST PULMONARY ARTERY PRESSURE: 32 MMHG

## 2022-05-11 PROCEDURE — 1101F PT FALLS ASSESS-DOCD LE1/YR: CPT | Mod: CPTII,S$GLB,, | Performed by: INTERNAL MEDICINE

## 2022-05-11 PROCEDURE — 93306 TTE W/DOPPLER COMPLETE: CPT | Mod: 26,,, | Performed by: INTERNAL MEDICINE

## 2022-05-11 PROCEDURE — 3051F PR MOST RECENT HEMOGLOBIN A1C LEVEL 7.0 - < 8.0%: ICD-10-PCS | Mod: CPTII,S$GLB,, | Performed by: INTERNAL MEDICINE

## 2022-05-11 PROCEDURE — 3288F FALL RISK ASSESSMENT DOCD: CPT | Mod: CPTII,S$GLB,, | Performed by: INTERNAL MEDICINE

## 2022-05-11 PROCEDURE — 1126F AMNT PAIN NOTED NONE PRSNT: CPT | Mod: CPTII,S$GLB,, | Performed by: INTERNAL MEDICINE

## 2022-05-11 PROCEDURE — 3051F HG A1C>EQUAL 7.0%<8.0%: CPT | Mod: CPTII,S$GLB,, | Performed by: INTERNAL MEDICINE

## 2022-05-11 PROCEDURE — 93306 TTE W/DOPPLER COMPLETE: CPT

## 2022-05-11 PROCEDURE — 3008F PR BODY MASS INDEX (BMI) DOCUMENTED: ICD-10-PCS | Mod: CPTII,S$GLB,, | Performed by: INTERNAL MEDICINE

## 2022-05-11 PROCEDURE — 99214 PR OFFICE/OUTPT VISIT, EST, LEVL IV, 30-39 MIN: ICD-10-PCS | Mod: S$GLB,,, | Performed by: INTERNAL MEDICINE

## 2022-05-11 PROCEDURE — 1126F PR PAIN SEVERITY QUANTIFIED, NO PAIN PRESENT: ICD-10-PCS | Mod: CPTII,S$GLB,, | Performed by: INTERNAL MEDICINE

## 2022-05-11 PROCEDURE — 4010F ACE/ARB THERAPY RXD/TAKEN: CPT | Mod: CPTII,S$GLB,, | Performed by: INTERNAL MEDICINE

## 2022-05-11 PROCEDURE — 99214 OFFICE O/P EST MOD 30 MIN: CPT | Mod: S$GLB,,, | Performed by: INTERNAL MEDICINE

## 2022-05-11 PROCEDURE — 3288F PR FALLS RISK ASSESSMENT DOCUMENTED: ICD-10-PCS | Mod: CPTII,S$GLB,, | Performed by: INTERNAL MEDICINE

## 2022-05-11 PROCEDURE — 93306 ECHO (CUPID ONLY): ICD-10-PCS | Mod: 26,,, | Performed by: INTERNAL MEDICINE

## 2022-05-11 PROCEDURE — 1157F PR ADVANCE CARE PLAN OR EQUIV PRESENT IN MEDICAL RECORD: ICD-10-PCS | Mod: CPTII,S$GLB,, | Performed by: INTERNAL MEDICINE

## 2022-05-11 PROCEDURE — 3008F BODY MASS INDEX DOCD: CPT | Mod: CPTII,S$GLB,, | Performed by: INTERNAL MEDICINE

## 2022-05-11 PROCEDURE — 93793 PR ANTICOAGULANT MGMT FOR PT TAKING WARFARIN: ICD-10-PCS | Mod: S$GLB,,,

## 2022-05-11 PROCEDURE — 93793 ANTICOAG MGMT PT WARFARIN: CPT | Mod: S$GLB,,,

## 2022-05-11 PROCEDURE — 4010F PR ACE/ARB THEARPY RXD/TAKEN: ICD-10-PCS | Mod: CPTII,S$GLB,, | Performed by: INTERNAL MEDICINE

## 2022-05-11 PROCEDURE — 99999 PR PBB SHADOW E&M-EST. PATIENT-LVL IV: ICD-10-PCS | Mod: PBBFAC,,, | Performed by: INTERNAL MEDICINE

## 2022-05-11 PROCEDURE — 99999 PR PBB SHADOW E&M-EST. PATIENT-LVL IV: CPT | Mod: PBBFAC,,, | Performed by: INTERNAL MEDICINE

## 2022-05-11 PROCEDURE — 1157F ADVNC CARE PLAN IN RCRD: CPT | Mod: CPTII,S$GLB,, | Performed by: INTERNAL MEDICINE

## 2022-05-11 PROCEDURE — 1101F PR PT FALLS ASSESS DOC 0-1 FALLS W/OUT INJ PAST YR: ICD-10-PCS | Mod: CPTII,S$GLB,, | Performed by: INTERNAL MEDICINE

## 2022-05-11 RX ORDER — LANOLIN ALCOHOL/MO/W.PET/CERES
1 CREAM (GRAM) TOPICAL 3 TIMES DAILY
Qty: 90 TABLET | Refills: 11 | Status: SHIPPED | OUTPATIENT
Start: 2022-05-11 | End: 2023-05-26 | Stop reason: SDUPTHER

## 2022-05-11 RX ORDER — MIRTAZAPINE 15 MG/1
15 TABLET, FILM COATED ORAL NIGHTLY
Qty: 30 TABLET | Refills: 11 | Status: SHIPPED | OUTPATIENT
Start: 2022-05-11 | End: 2023-03-01

## 2022-05-11 NOTE — PROGRESS NOTES
Subjective:   Patient ID:  Rocco France is a 66 y.o. male who presents for LVAD followup visit.    Implant Date: 1/13/2021  Initials: [facility]     Heartmate 3 RPM 5100     INR goal: 2-3   Bridge with Heparin   Antiplatelets: ASA 81     TXP JACEY INTERROGATIONS 7/13/2022   Type HeartMate3   Flow 4.9   Speed 5000   PI 2.7   Power (Edwards) 3.7   LSL 4700   Pulsatility No Pulse   Interrogation of Ventricular assist device was performed with physician analysis of device parameters and review of device function. I have personally reviewed the interrogation findings and agree with findings as stated.     HPI  67 yo BM with stage D CHF due to NICMP s/p HM3, s/p ICD, HTN, HLD, T2DM returns for routine LVAD visit.  pateint had a protracted admission course 01/27 to 02/17/22 with covid and syncope with subdural and subarachnoid  Hemorrhage. Patient had been intubated on transfer, was extubated soon after. Extubated without problems. NS/NCC consulted and followed. Did well overall had INR goal reduced. Patient discharged with good BP control.    Patient at last clinic visit was arranged to have RHC and echo, however got admitted with afib. During this admission had speed increased to 5100 rpm based on echo and RHC and how he was doing. Seems more depressed today, worried about his memory loss. Appetite less, certain foods not having a taste for. Not getting restful sleep. Not sure if snores or not. Slight GARCÍA, improved overall but not as strong as he was before the admission earlier this year with the subdural hemorrhages. Additionally patient has no bleeding, no bright red blood per rectum, melena, no GI symptoms at all. NYHA FC II-III.     Review of Systems   Constitutional: Negative for chills, decreased appetite, diaphoresis, fever, malaise/fatigue, weight gain and weight loss.   Eyes: Negative for visual disturbance.   Cardiovascular: Positive for dyspnea on exertion. Negative for chest pain, claudication, cyanosis, irregular  "heartbeat, leg swelling, near-syncope, orthopnea, palpitations, paroxysmal nocturnal dyspnea and syncope.   Respiratory: Positive for shortness of breath. Negative for cough, hemoptysis, sleep disturbances due to breathing, snoring, sputum production and wheezing.    Hematologic/Lymphatic: Negative for adenopathy and bleeding problem. Does not bruise/bleed easily.   Skin: Negative for color change, poor wound healing, rash, skin cancer and suspicious lesions.   Musculoskeletal: Negative for back pain, falls, gout, joint pain and muscle weakness.   Gastrointestinal: Negative for bloating, abdominal pain, anorexia, constipation, diarrhea, heartburn, hematemesis, hematochezia, hemorrhoids, melena, nausea and vomiting.   Genitourinary: Negative for nocturia and urgency.   Neurological: Negative for excessive daytime sleepiness, dizziness, focal weakness, headaches, light-headedness, paresthesias, tremors and weakness.   Psychiatric/Behavioral: Negative for depression and memory loss. The patient does not have insomnia and is not nervous/anxious.        Objective:   Blood pressure (!) 85/0, pulse 100, temperature 98.3 °F (36.8 °C), temperature source Oral, height 6' 2" (1.88 m), weight 90.7 kg (200 lb).body mass index is 25.68 kg/m².    Physical Exam  Vitals and nursing note reviewed.   Constitutional:       General: He is not in acute distress.     Appearance: He is well-developed. He is not ill-appearing, toxic-appearing or diaphoretic.   HENT:      Head: Normocephalic and atraumatic.   Eyes:      General: No scleral icterus.        Right eye: No discharge.         Left eye: No discharge.      Conjunctiva/sclera: Conjunctivae normal.   Neck:      Thyroid: No thyromegaly.      Vascular: No JVD.      Trachea: No tracheal deviation.   Cardiovascular:      Comments: Normal LVAD sounds; DL 1  jvp at 13cm H2O  Pulmonary:      Effort: Pulmonary effort is normal. No respiratory distress.      Breath sounds: Normal breath " sounds. No wheezing or rales.   Abdominal:      General: Bowel sounds are normal. There is no distension (liver span is normal).      Palpations: Abdomen is soft. There is no mass.      Tenderness: There is no abdominal tenderness. There is no guarding or rebound.   Musculoskeletal:         General: Swelling (0.5+) present. No tenderness.      Right lower leg: Edema (0.5+) present.      Left lower leg: Edema (0.5+) present.   Skin:     General: Skin is warm and dry.   Neurological:      General: No focal deficit present.      Mental Status: He is alert. Mental status is at baseline.   Psychiatric:         Mood and Affect: Mood normal.         Behavior: Behavior normal.         Thought Content: Thought content normal.         Judgment: Judgment normal.     Last ECHO 9/22/21 Summary  · There is an LVAD present. Base speed is 5000 RPMs. The pump type is a Heartmate III. The interventricular septum appears midline. The aortic valve opens intermittently.  · The left ventricle is moderately enlarged with severely decreased systolic function. The estimated ejection fraction is 25%.  · There is left ventricular global hypokinesis.  · Moderate right ventricular enlargement with mildly reduced right ventricular systolic function.  · Grade III left ventricular diastolic dysfunction.  · Mild biatrial enlargement.  · Mild mitral regurgitation.  · The estimated PA systolic pressure is 36 mmHg.  · Intermediate central venous pressure (8 mmHg).  · The sinuses of Valsalva is mildly dilated.  · The ascending aorta is mildly dilated.      Assessment:      1. Needs sleep apnea assessment    2. Heart replaced by heart assist device    3. LVAD (left ventricular assist device) present    4. NICM (nonischemic cardiomyopathy)    5. Essential hypertension    6. Atrial fibrillation, unspecified type    7. Anticoagulated        Plan:   Will start remeron 15mg po QHS to see if helps with appetite, sleep, depression/anxiety he is expressing  today.   Recommend sleep apnea evaluation as well.   Will hold off on speed change for now.  Recommend 2 gram sodium restriction and 1500cc fluid restriction.  Encourage physical activity with graded exercise program.  Requested patient to weigh themselves daily, and to notify us if their weight increases by more than 3 lbs in 1 day or 5 lbs in 1 week.  Will arrange for palliative care consult as well, believe seen in the past since DT vad but not sure.      Patient is now NYHA II - III.     Listed for transplant: No    UNOS Patient Status  Functional Status: 80% - Normal activity with effort: some symptoms of disease  Physical Capacity: No Limitations  Working for Income: No  If no, reason not working: Patient Choice - Retired

## 2022-05-11 NOTE — LETTER
July 24, 2022        Teresa Mendoza  [address]  [address]  [place] [address]  Phone: [phone]  Fax: [phone]             [facility]  [address]  [place] [address]  Phone: [phone]   Patient: Rocco France   MR Number: [id number]   YOB: 1955   Date of Visit: 5/11/2022       Dear Dr. Teresa Mendoza    Thank you for referring Rocco France to me for evaluation. Attached you will find relevant portions of my assessment and plan of care.    If you have questions, please do not hesitate to call me. I look forward to following Rocco France along with you.    Sincerely,    Deana Lake MD    Enclosure    If you would like to receive this communication electronically, please contact [email or web address] or [phone] to request MIOX Link access.    MIOX Link is a tool which provides read-only access to select patient information with whom you have a relationship. Its easy to use and provides real time access to review your patients record including encounter summaries, notes, results, and demographic information.    If you feel you have received this communication in error or would no longer like to receive these types of communications, please e-mail [email or web address]

## 2022-05-11 NOTE — PROGRESS NOTES
Date of Implant with Heartmate 3 LVAD: 1/13/21    PATIENT ARRIVED IN CLINIC:  Ambulatory   Accompanied by: N/A    Vitals  Temperature, oral:   Temp Readings from Last 1 Encounters:   05/11/22 98.3 °F (36.8 °C) (Oral)     Blood Pressure:   BP Readings from Last 3 Encounters:   05/11/22 (!) 85/0   04/21/22 95/72   03/26/22 (!) 70/0        VAD Interrogation:  TXP JACEY INTERROGATIONS 5/11/2022 3/26/2022 3/26/2022   Type HeartMate3 - -   Flow 4.0 - -   Speed 5100 - -   PI 6.2 - -   Power (Edwards) 3.6 - -   LSL 4700 - -   Pulsatility Pulse Pulse Pulse       History Log: [id number]  Problems / Issues / Alarms with VAD if any: None noted  VAD Sounds: HM3 Smooth  Heartmate 3 Module Cable:  No yellow exposed    HCT:   Lab Results   Component Value Date    HCT 33.7 (L) 05/11/2022    HCT 30 (L) 03/22/2022       Complaints/reason for visit today: routine    VAD Binder With Patient: no   Reviewed VAD Numbers In Binder: no  Enrolled in Care Companion: no    Equipment:  Emergency Equipment With Patient: yes   Any Equipment Issues: Patient requested a smaller vest   It is medically necessary to ensure patient has properly functioning equipment and wearables to prevent infection, injury or death to patient.     DLES Assessment:  Appearance Of Driveline: 1  Antibiotics: NO  Velour: no  Manual & Visual Inspection Of Driveline: No kinks or tears noted  Stabilization Device In Use: yes, [place] securement device      Patient MyChart Questionnaire: No flowsheet data found.     Assessment:   PAIN: NO  Complaints Of Nausea / Vomiting: None noted    Appearance and Frequency Of Stools: normal and formed without blood & daily  Color Of Urine: clear/yellow  Coping/Depression/Anxiety: coping okay  Sleep Habits: 6 hrs /night  Sleep Aids: None noted  Showering: Yes, reminded to change dressing immediately after drying off  Activity/Exercise: yardwork/walking   Driving: Yes. Reminded to pull over should there be an alarm before looking down at  controller.    DLES Dressing Care:   Frequency of Dressing Changes: daily & daily kit  Pt In Need Of Management Kits?:yes -   1 Box of daily kit  It is medically necessary to have VAD management kits in order to prevent infection or to assist in the healing of an infected DLES.    Labs:    Chemistry        Component Value Date/Time     05/11/2022 0711    K 3.6 05/11/2022 0711     05/11/2022 0711    CO2 26 05/11/2022 0711    BUN 16 05/11/2022 0711    CREATININE 1.2 05/11/2022 0711     (H) 05/11/2022 0711        Component Value Date/Time    CALCIUM 8.8 05/11/2022 0711    ALKPHOS 165 (H) 05/11/2022 0711    AST 18 05/11/2022 0711    ALT <5 (L) 05/11/2022 0711    BILITOT 2.3 (H) 05/11/2022 0711    ESTGFRAFRICA >60.0 05/11/2022 0711    EGFRNONAA >60.0 05/11/2022 0711            Magnesium   Date Value Ref Range Status   05/11/2022 2.2 1.6 - 2.6 mg/dL Final       Lab Results   Component Value Date    WBC 7.38 05/11/2022    HGB 10.1 (L) 05/11/2022    HCT 33.7 (L) 05/11/2022    MCV 87 05/11/2022     05/11/2022       Lab Results   Component Value Date    INR 3.6 (H) 05/11/2022    INR 2.5 05/09/2022    INR 2.1 05/05/2022       BNP   Date Value Ref Range Status   05/11/2022 910 (H) 0 - 99 pg/mL Final     Comment:     Values of less than 100 pg/ml are consistent with non-CHF populations.   03/25/2022 915 (H) 0 - 99 pg/mL Final     Comment:     Values of less than 100 pg/ml are consistent with non-CHF populations.   03/22/2022 616 (H) 0 - 99 pg/mL Final     Comment:     Values of less than 100 pg/ml are consistent with non-CHF populations.       LD   Date Value Ref Range Status   05/11/2022 237 110 - 260 U/L Final     Comment:     Results are increased in hemolyzed samples.   03/26/2022 309 (H) 110 - 260 U/L Final     Comment:     Results are increased in hemolyzed samples.   03/24/2022 345 (H) 110 - 260 U/L Final     Comment:     Results are increased in hemolyzed samples.       Labs reviewed with  patient: YES      Patient Satisfaction Survey completed per patient: No  (explained about signature and box to check)  Medication reconciliation: per MA.  Medication Detail updated today: yes  Coumadin Managed by: [facility]    Education: Reviewed driveline care, emergency procedures, how to change the controller, alarms with patient, as well as discussed how to page the VAD coordinator in case of an emergency.   Covid - 19 education: Reminded patient/caregiver to check temperature daily and call us if it is > 99.0.  Reminded them  to stay 6 feet away from other people, wash hands frequently, don't touch your face and stay home except to get labs, medications, and appts.    Covid Vaccine: Pt informed that we are encouraging all VAD patients to receive the COVID vaccine.  Informed pt that they can take tylenol but should avoid other NSAIDs.      Plans/Needs: Patient seen in clinic for routine follow up with Dr. Lake. Patient is doing well, BP is borderline high but patient did not sleep previous night and did not take morning medications, he has them in his car. Patient requested a smaller vest,  is fitting patient for vest. Patient completed an echo. Patient to be started on Remeron d/t concerns about drop in appetite and memory. Sleep consult also place. Patient to RTC in 2 months with routine lipids.    Hurricane Season: No

## 2022-05-12 NOTE — PROGRESS NOTES
Patient wife questioned and confirmed dose .  Recent cranberry juice intake no other changes reported.

## 2022-05-16 ENCOUNTER — ANTI-COAG VISIT (OUTPATIENT)
Dept: CARDIOLOGY | Facility: CLINIC | Age: 67
End: 2022-05-16
Payer: MEDICARE

## 2022-05-16 DIAGNOSIS — Z95.811 LVAD (LEFT VENTRICULAR ASSIST DEVICE) PRESENT: Primary | ICD-10-CM

## 2022-05-16 LAB — INR PPP: 3.2

## 2022-05-16 PROCEDURE — 93793 PR ANTICOAGULANT MGMT FOR PT TAKING WARFARIN: ICD-10-PCS | Mod: S$GLB,,,

## 2022-05-16 PROCEDURE — 93793 ANTICOAG MGMT PT WARFARIN: CPT | Mod: S$GLB,,,

## 2022-05-17 ENCOUNTER — DOCUMENTATION ONLY (OUTPATIENT)
Dept: TRANSPLANT | Facility: CLINIC | Age: 67
End: 2022-05-17
Payer: MEDICARE

## 2022-05-17 NOTE — PROGRESS NOTES
Communication received from home health care  Patient not seen on 4/27/22 due to no answer to phone call hh did not visit patient.     This information relayed to VAD coordinator.

## 2022-05-19 ENCOUNTER — ANTI-COAG VISIT (OUTPATIENT)
Dept: CARDIOLOGY | Facility: CLINIC | Age: 67
End: 2022-05-19
Payer: MEDICARE

## 2022-05-19 DIAGNOSIS — Z95.811 LVAD (LEFT VENTRICULAR ASSIST DEVICE) PRESENT: Primary | ICD-10-CM

## 2022-05-19 LAB — INR PPP: 2.3

## 2022-05-19 PROCEDURE — 93793 ANTICOAG MGMT PT WARFARIN: CPT | Mod: S$GLB,,,

## 2022-05-19 PROCEDURE — 93793 PR ANTICOAGULANT MGMT FOR PT TAKING WARFARIN: ICD-10-PCS | Mod: S$GLB,,,

## 2022-05-24 ENCOUNTER — ANTI-COAG VISIT (OUTPATIENT)
Dept: CARDIOLOGY | Facility: CLINIC | Age: 67
End: 2022-05-24
Payer: MEDICARE

## 2022-05-24 DIAGNOSIS — Z95.811 LVAD (LEFT VENTRICULAR ASSIST DEVICE) PRESENT: Primary | ICD-10-CM

## 2022-05-24 LAB — INR PPP: 1.6

## 2022-05-24 PROCEDURE — 93793 ANTICOAG MGMT PT WARFARIN: CPT | Mod: S$GLB,,,

## 2022-05-24 PROCEDURE — 93793 PR ANTICOAGULANT MGMT FOR PT TAKING WARFARIN: ICD-10-PCS | Mod: S$GLB,,,

## 2022-05-24 NOTE — PROGRESS NOTES
Patient wife questioned and confirmed dose . Reports recent cranberry juice but denies any missed doses, greens, nuts intake or etc.

## 2022-05-26 ENCOUNTER — ANTI-COAG VISIT (OUTPATIENT)
Dept: CARDIOLOGY | Facility: CLINIC | Age: 67
End: 2022-05-26
Payer: MEDICARE

## 2022-05-26 DIAGNOSIS — Z95.811 LVAD (LEFT VENTRICULAR ASSIST DEVICE) PRESENT: Primary | ICD-10-CM

## 2022-05-26 LAB — INR PPP: 1.9

## 2022-05-26 PROCEDURE — 93793 ANTICOAG MGMT PT WARFARIN: CPT | Mod: S$GLB,,,

## 2022-05-26 PROCEDURE — 93793 PR ANTICOAGULANT MGMT FOR PT TAKING WARFARIN: ICD-10-PCS | Mod: S$GLB,,,

## 2022-05-26 NOTE — PROGRESS NOTES
INR just slightly below goal. Medications, chart, and patient findings reviewed. See calendar for adjustments to dose and follow up plan.

## 2022-05-30 ENCOUNTER — ANTI-COAG VISIT (OUTPATIENT)
Dept: CARDIOLOGY | Facility: CLINIC | Age: 67
End: 2022-05-30
Payer: MEDICARE

## 2022-05-30 DIAGNOSIS — Z95.811 LVAD (LEFT VENTRICULAR ASSIST DEVICE) PRESENT: Primary | ICD-10-CM

## 2022-05-30 LAB — INR PPP: 2.1

## 2022-05-30 PROCEDURE — 93793 PR ANTICOAGULANT MGMT FOR PT TAKING WARFARIN: ICD-10-PCS | Mod: S$GLB,,,

## 2022-05-30 PROCEDURE — 93793 ANTICOAG MGMT PT WARFARIN: CPT | Mod: S$GLB,,,

## 2022-06-01 ENCOUNTER — DOCUMENTATION ONLY (OUTPATIENT)
Dept: TRANSPLANT | Facility: CLINIC | Age: 67
End: 2022-06-01
Payer: MEDICARE

## 2022-06-02 ENCOUNTER — ANTI-COAG VISIT (OUTPATIENT)
Dept: CARDIOLOGY | Facility: CLINIC | Age: 67
End: 2022-06-02
Payer: MEDICARE

## 2022-06-02 DIAGNOSIS — Z95.811 LVAD (LEFT VENTRICULAR ASSIST DEVICE) PRESENT: Primary | ICD-10-CM

## 2022-06-02 LAB — INR PPP: 1.8

## 2022-06-02 PROCEDURE — 93793 PR ANTICOAGULANT MGMT FOR PT TAKING WARFARIN: ICD-10-PCS | Mod: S$GLB,,,

## 2022-06-02 PROCEDURE — 93793 ANTICOAG MGMT PT WARFARIN: CPT | Mod: S$GLB,,,

## 2022-06-02 NOTE — PROGRESS NOTES
Patient wife questioned and confirmed dose . Appetite is up/down - wife stated.  No other changes .

## 2022-06-03 ENCOUNTER — TELEPHONE (OUTPATIENT)
Dept: TRANSPLANT | Facility: CLINIC | Age: 67
End: 2022-06-03
Payer: MEDICARE

## 2022-06-03 NOTE — TELEPHONE ENCOUNTER
Called patient's wife to check on patient's appetite, memory and mentation. No answer, voicemail left.

## 2022-06-06 ENCOUNTER — ANTI-COAG VISIT (OUTPATIENT)
Dept: CARDIOLOGY | Facility: CLINIC | Age: 67
End: 2022-06-06
Payer: MEDICARE

## 2022-06-06 DIAGNOSIS — Z95.811 LVAD (LEFT VENTRICULAR ASSIST DEVICE) PRESENT: Primary | ICD-10-CM

## 2022-06-06 LAB — INR PPP: 1.7

## 2022-06-06 PROCEDURE — 93793 PR ANTICOAGULANT MGMT FOR PT TAKING WARFARIN: ICD-10-PCS | Mod: S$GLB,,,

## 2022-06-06 PROCEDURE — 93793 ANTICOAG MGMT PT WARFARIN: CPT | Mod: S$GLB,,,

## 2022-06-09 ENCOUNTER — ANTI-COAG VISIT (OUTPATIENT)
Dept: CARDIOLOGY | Facility: CLINIC | Age: 67
End: 2022-06-09
Payer: MEDICARE

## 2022-06-09 DIAGNOSIS — Z95.811 LVAD (LEFT VENTRICULAR ASSIST DEVICE) PRESENT: Primary | ICD-10-CM

## 2022-06-09 LAB — INR PPP: 1.5

## 2022-06-09 PROCEDURE — 93793 ANTICOAG MGMT PT WARFARIN: CPT | Mod: S$GLB,,,

## 2022-06-09 PROCEDURE — 93793 PR ANTICOAGULANT MGMT FOR PT TAKING WARFARIN: ICD-10-PCS | Mod: S$GLB,,,

## 2022-06-09 NOTE — PROGRESS NOTES
Patient wife questioned and confirmed dose . Recent headache this morning no new meds or any other changes.

## 2022-06-09 NOTE — PROGRESS NOTES
INR not at goal. Medications, chart, and patient findings reviewed. See calendar for adjustments to dose and follow up plan. Plan reviewed with Dr. Lake.

## 2022-06-13 ENCOUNTER — ANTI-COAG VISIT (OUTPATIENT)
Dept: CARDIOLOGY | Facility: CLINIC | Age: 67
End: 2022-06-13
Payer: MEDICARE

## 2022-06-13 DIAGNOSIS — Z95.811 LVAD (LEFT VENTRICULAR ASSIST DEVICE) PRESENT: Primary | ICD-10-CM

## 2022-06-13 LAB — INR PPP: 1.4

## 2022-06-13 PROCEDURE — 93793 PR ANTICOAGULANT MGMT FOR PT TAKING WARFARIN: ICD-10-PCS | Mod: S$GLB,,,

## 2022-06-13 PROCEDURE — 93793 ANTICOAG MGMT PT WARFARIN: CPT | Mod: S$GLB,,,

## 2022-06-13 NOTE — PROGRESS NOTES
INR not at goal. Medications, chart, and patient findings reviewed. See calendar for adjustments to dose and follow up plan.        
Patient wife questioned and confirmed dose but patient taken 2.5 mg 6/12.  No other changes reported.  
(4) rarely moist

## 2022-06-14 ENCOUNTER — PATIENT MESSAGE (OUTPATIENT)
Dept: TRANSPLANT | Facility: CLINIC | Age: 67
End: 2022-06-14
Payer: MEDICARE

## 2022-06-17 ENCOUNTER — ANTI-COAG VISIT (OUTPATIENT)
Dept: CARDIOLOGY | Facility: CLINIC | Age: 67
End: 2022-06-17
Payer: MEDICARE

## 2022-06-17 ENCOUNTER — PATIENT MESSAGE (OUTPATIENT)
Dept: CARDIOTHORACIC SURGERY | Facility: CLINIC | Age: 67
End: 2022-06-17
Payer: MEDICARE

## 2022-06-17 DIAGNOSIS — Z95.811 LVAD (LEFT VENTRICULAR ASSIST DEVICE) PRESENT: Primary | ICD-10-CM

## 2022-06-17 LAB — INR PPP: 1.7

## 2022-06-17 PROCEDURE — 93793 ANTICOAG MGMT PT WARFARIN: CPT | Mod: S$GLB,,,

## 2022-06-17 PROCEDURE — 93793 PR ANTICOAGULANT MGMT FOR PT TAKING WARFARIN: ICD-10-PCS | Mod: S$GLB,,,

## 2022-06-17 NOTE — PROGRESS NOTES
INR not at goal but is improving. Medications, chart, and patient findings reviewed. See calendar for adjustments to dose and follow up plan.

## 2022-06-17 NOTE — PROGRESS NOTES
Wife and Patient called and were given lab result, verified correct warfarin dosage, reports no other changes

## 2022-06-20 ENCOUNTER — ANTI-COAG VISIT (OUTPATIENT)
Dept: CARDIOLOGY | Facility: CLINIC | Age: 67
End: 2022-06-20
Payer: MEDICARE

## 2022-06-20 DIAGNOSIS — Z95.811 LVAD (LEFT VENTRICULAR ASSIST DEVICE) PRESENT: Primary | ICD-10-CM

## 2022-06-20 LAB — INR PPP: 1.8

## 2022-06-20 PROCEDURE — 93793 PR ANTICOAGULANT MGMT FOR PT TAKING WARFARIN: ICD-10-PCS | Mod: S$GLB,,,

## 2022-06-20 PROCEDURE — 93793 ANTICOAG MGMT PT WARFARIN: CPT | Mod: S$GLB,,,

## 2022-06-20 NOTE — PROGRESS NOTES
INR not at goal but is slowly improving. Medications, chart, and patient findings reviewed. See calendar for adjustments to dose and follow up plan.

## 2022-06-23 LAB — INR PPP: 2.3

## 2022-06-24 ENCOUNTER — ANTI-COAG VISIT (OUTPATIENT)
Dept: CARDIOLOGY | Facility: CLINIC | Age: 67
End: 2022-06-24
Payer: MEDICARE

## 2022-06-24 DIAGNOSIS — Z95.811 LVAD (LEFT VENTRICULAR ASSIST DEVICE) PRESENT: Primary | ICD-10-CM

## 2022-06-24 PROCEDURE — 93793 ANTICOAG MGMT PT WARFARIN: CPT | Mod: S$GLB,,,

## 2022-06-24 PROCEDURE — 93793 PR ANTICOAGULANT MGMT FOR PT TAKING WARFARIN: ICD-10-PCS | Mod: S$GLB,,,

## 2022-06-27 ENCOUNTER — ANTI-COAG VISIT (OUTPATIENT)
Dept: CARDIOLOGY | Facility: CLINIC | Age: 67
End: 2022-06-27
Payer: MEDICARE

## 2022-06-27 DIAGNOSIS — Z95.811 LVAD (LEFT VENTRICULAR ASSIST DEVICE) PRESENT: Primary | ICD-10-CM

## 2022-06-27 LAB — INR PPP: 2.2

## 2022-06-27 PROCEDURE — 93793 ANTICOAG MGMT PT WARFARIN: CPT | Mod: S$GLB,,,

## 2022-06-27 PROCEDURE — 93793 PR ANTICOAGULANT MGMT FOR PT TAKING WARFARIN: ICD-10-PCS | Mod: S$GLB,,,

## 2022-06-29 ENCOUNTER — TELEPHONE (OUTPATIENT)
Dept: PALLIATIVE MEDICINE | Facility: CLINIC | Age: 67
End: 2022-06-29
Payer: MEDICARE

## 2022-06-30 ENCOUNTER — ANTI-COAG VISIT (OUTPATIENT)
Dept: CARDIOLOGY | Facility: CLINIC | Age: 67
End: 2022-06-30
Payer: MEDICARE

## 2022-06-30 DIAGNOSIS — Z95.811 LVAD (LEFT VENTRICULAR ASSIST DEVICE) PRESENT: Primary | ICD-10-CM

## 2022-06-30 LAB — INR PPP: 2.1

## 2022-06-30 PROCEDURE — 93793 PR ANTICOAGULANT MGMT FOR PT TAKING WARFARIN: ICD-10-PCS | Mod: S$GLB,,,

## 2022-06-30 PROCEDURE — 93793 ANTICOAG MGMT PT WARFARIN: CPT | Mod: S$GLB,,,

## 2022-07-05 LAB — INR PPP: 1.6

## 2022-07-06 ENCOUNTER — ANTI-COAG VISIT (OUTPATIENT)
Dept: CARDIOLOGY | Facility: CLINIC | Age: 67
End: 2022-07-06
Payer: MEDICARE

## 2022-07-06 DIAGNOSIS — Z95.811 LVAD (LEFT VENTRICULAR ASSIST DEVICE) PRESENT: Primary | ICD-10-CM

## 2022-07-07 ENCOUNTER — ANTI-COAG VISIT (OUTPATIENT)
Dept: CARDIOLOGY | Facility: CLINIC | Age: 67
End: 2022-07-07
Payer: MEDICARE

## 2022-07-07 ENCOUNTER — TELEPHONE (OUTPATIENT)
Dept: TRANSPLANT | Facility: CLINIC | Age: 67
End: 2022-07-07
Payer: MEDICARE

## 2022-07-07 DIAGNOSIS — Z95.811 LVAD (LEFT VENTRICULAR ASSIST DEVICE) PRESENT: Primary | ICD-10-CM

## 2022-07-07 LAB — INR PPP: 1.6

## 2022-07-07 PROCEDURE — 93793 ANTICOAG MGMT PT WARFARIN: CPT | Mod: S$GLB,,,

## 2022-07-07 PROCEDURE — 93793 PR ANTICOAGULANT MGMT FOR PT TAKING WARFARIN: ICD-10-PCS | Mod: S$GLB,,,

## 2022-07-07 NOTE — TELEPHONE ENCOUNTER
Calls placed to patient as we received notification from [facility] that patient fell. Talked with patient and he stated that he had no symptoms but his legs sometimes give out because he is not as strong as he used to be. Patient denies hitting anything other than bottom. Dr. Lake made aware, will continue to monitor patient.

## 2022-07-07 NOTE — PROGRESS NOTES
Ms. France questioned and confirmed dose .  Reports dizziness on Monday 7/4 and  and fell while leaving out the  bathroom  yesterday 7/6/22  No changes w/ appetite , new meds , or etc.

## 2022-07-07 NOTE — PROGRESS NOTES
Ms. France questioned and confirmed dose .  Reports dizziness on Monday 7/4 and  and fell while leaving out the  bathroom  yesterday 7/6/22.  They are currently at [facility] ph [phone] fax [phone]) results Lab - [phone] to have INR drawn 7/7/22 - wife stated.  No other changes reported.

## 2022-07-11 ENCOUNTER — ANTI-COAG VISIT (OUTPATIENT)
Dept: CARDIOLOGY | Facility: CLINIC | Age: 67
End: 2022-07-11
Payer: MEDICARE

## 2022-07-11 DIAGNOSIS — Z95.811 LVAD (LEFT VENTRICULAR ASSIST DEVICE) PRESENT: Primary | ICD-10-CM

## 2022-07-11 LAB — INR PPP: 1.8

## 2022-07-11 PROCEDURE — 93793 PR ANTICOAGULANT MGMT FOR PT TAKING WARFARIN: ICD-10-PCS | Mod: S$GLB,,,

## 2022-07-11 PROCEDURE — 93793 ANTICOAG MGMT PT WARFARIN: CPT | Mod: S$GLB,,,

## 2022-07-12 ENCOUNTER — TELEPHONE (OUTPATIENT)
Dept: TRANSPLANT | Facility: CLINIC | Age: 67
End: 2022-07-12
Payer: MEDICARE

## 2022-07-12 NOTE — PROGRESS NOTES
ADVANCED HEART FAILURE AND TRANSPLANTATION LVAD CLINIC VISIT      CHIEF COMPLAINT:  Follow-up of chronic heart failure post-LVAD    HISTORY OF PRESENT ILLNESS:  Rocco France is a 66 y.o.  male with a past medical history remarkable for non-ischemic cardiomyopathy status post DT HM3 implantation 1/13/2021 with sternal closure 1/14/2021 with unremarkable post-operative course.    He was admitted for syncope 1/27/2022 at which time he was found to have a evolving right cerebral acute subdural hematoma and acute basal cistern subarachnoid hemorrhage. He requiring intubation with prolonged hospitalization and was incidentally found to be positive for COVID-19 and treated with remdesivir. He was taken off of aspirin and discharged home 2/17/2022. He was also admitted 3/2022 for 3 days due to dysequilibrium, diplopia and repeat CT head was stable. Neurology recommended myasthenia gravis workup which was negative.     Co-morbidities: PAF, HTN, HLD, DM2, COVID-19 infection 1/2022    Last seen in clinic 5/11/2022 at which time he was noting low appetite with limited taste for foods and inability to get restful sleep. He was recommended to try Remeron but has not gotten this.     Since their last clinic visit, Doppler 70, 5000 RPM Flows 4-5 L/min, DLES 1, no issues with driveline. Standing Doppler 58   BNP is lower for him at 107 and has had a couple of falls at home feeling lightheaded and has some arthritic changes in knees. No syncope but multiple presyncopal episodes leading to falls. Denies SOB with current activity, orthopnea, PND, bendopnea, abdominal distension, early satiety, nausea, lower extremity edema, chest discomfort, presyncope, palpitations, or syncope. Denies adverse bleeding, no hematochezia/melena/hematuria/hematemesis. Notes some night sweats with low appetite that is new but not frequent with progressive weight loss.     INR goal: Goal INR 2.0-3.0  Antiplatelets: OFF ASPIRIN AFTER  SAH/SDH 1/2022  LDH: stable overall  Speed set at:  5000  Pulsatility: non-pulsatile  Antihypertensives:  Amlodipine 10 mg daily  Diuretic: Lasix 80 mg BID  GDMT Aldactone 25 mg daily, lisinopril 20 mg AM/10 mg PM  VAD interrogation: no events  TXP JACEY INTERROGATIONS 7/13/2022 5/11/2022 3/26/2022   Type HeartMate3 HeartMate3 -   Flow 4.9 4.0 -   Speed 5000 5100 -   PI 2.7 6.2 -   Power (Edwards) 3.7 3.6 -   LSL 4700 4700 -   Pulsatility No Pulse Pulse Pulse     I interrogated the patient's HeartMate 3 LVAD.     Current device parameters reviewed. There was no evidence of power spikes or suction events. The driveline was structurally intact without evidence of infection.     Cardiac Data:  Transthoracic Echo: Results for orders placed during the hospital encounter of 05/11/22  · There is an LVAD present. Base speed is 5100 RPMs. The pump type is a Heartmate III. The interventricular septum appears to bow into the right ventricle. The aortic valve opens intermittently with trivial AR.  · Mild left atrial enlargement.  · The left ventricle is severely enlarged LVEDD 69 mm with severely decreased systolic function.  · There is severe left ventricular global hypokinesis. The estimated ejection fraction is 15%.  · Left ventricular diastolic dysfunction.  · Mild right ventricular enlargement with moderately reduced right ventricular systolic function. TAPSE 0.94  · Mild-to-moderate mitral regurgitation.  · Moderate tricuspid regurgitation.  · Elevated central venous pressure (15 mmHg).  · The estimated PA systolic pressure is 32 mmHg.    ECG 3/22/2022: AF with RVR and aberrantly conducted beats 124 bpm, IVCD  ms    Left Heart Catheterization: none recent    Right Heart Catheterization: Results for orders placed during the hospital encounter of 03/22/22  RA: 16/ 16/ 11 RV: 30/ 10 PA: 35/ 16/ 22 PWP: 15/ 19/ 14 .   Cardiac output was 6.5  by Omnse. Cardiac index is 3.1 L/min/m2.   O2 Sat: PA 67%.   Pulmonary vascular  resistance: 98. Systemic vascular resistance: 749.   BP 90/63 (72);99%  Wedge sat 95%  HM 3 @5100    Devices: ICD 2010    PAST MEDICAL HISTORY:  Past Medical History:   Diagnosis Date    CLARISSE (acute kidney injury) 1/11/2021    CHF (congestive heart failure)     Chronic combined systolic and diastolic congestive heart failure 1/9/2021    Coronary artery disease     Diabetes mellitus     Hypertension     Kidney stones        PAST SURGICAL HISTORY:  Past Surgical History:   Procedure Laterality Date    CARDIAC CATHETERIZATION  2010    no stents    CARDIAC DEFIBRILLATOR PLACEMENT  2010    CARDIAC DEFIBRILLATOR PLACEMENT      HERNIA REPAIR      IRRIGATION OF MEDIASTINUM N/A 1/14/2021    Procedure: IRRIGATION, MEDIASTINUM;  Surgeon: Zenon Corona MD;  Location: [facility] OR [address] [place] [address];  Service: Cardiovascular;  Laterality: N/A;    KNEE ARTHROSCOPY Right     LEFT VENTRICULAR ASSIST DEVICE Left 1/13/2021    Procedure: INSERTION- HeartMate 3 LEFT VENTRICULAR ASSIST DEVICE ;  Surgeon: Zenon Corona MD;  Location: [address];  Service: Cardiovascular;  Laterality: Left;    RECONSTRUCTION OF PERICARDIUM N/A 1/14/2021    Procedure: RECONSTRUCTION, PERICARDIUM;  Surgeon: Zenon Corona MD;  Location: [facility] OR [address] [place] [address];  Service: Cardiovascular;  Laterality: N/A;    RIGHT HEART CATHETERIZATION Right 11/2/2020    Procedure: INSERTION, CATHETER, RIGHT HEART;  Surgeon: Deana Lake MD;  Location: [facility] CATH LAB;  Service: Cardiology;  Laterality: Right;    RIGHT HEART CATHETERIZATION Right 3/24/2022    Procedure: INSERTION, CATHETER, RIGHT HEART;  Surgeon: Manuel Ramos Jr., MD;  Location: [facility] CATH LAB;  Service: Cardiology;  Laterality: Right;    STERNAL WOUND CLOSURE  1/13/2021    Procedure: TEMPORARY CLOSURE OF CHEST;  Surgeon: Zenon Corona MD;  Location: [address];  Service: Cardiovascular;;    STERNAL WOUND CLOSURE N/A 1/14/2021    Procedure: CLOSURE, WOUND, STERNUM;  Surgeon: Zenon Corona MD;   Location: [facility] OR [address] [place] [address];  Service: Cardiovascular;  Laterality: N/A;       SOCIAL HISTORY  Social History     Socioeconomic History    Marital status:    Tobacco Use    Smoking status: Current Some Day Smoker     Types: Cigarettes    Smokeless tobacco: Never Used   Substance and Sexual Activity    Alcohol use: No       FAMILY HISTORY  Family History   Problem Relation Age of Onset    Heart attack Mother     Heart failure Brother     Heart attack Brother     Hypertension Brother        ALLERGIES:  Review of patient's allergies indicates:   Allergen Reactions    Pcn [penicillins] Hives       MEDICATIONS  Current Outpatient Medications on File Prior to Visit   Medication Sig Dispense Refill    acetaminophen (TYLENOL) 500 mg Cap Take 2 capsules (1,000 mg total) by mouth every 8 (eight) hours as needed (Pain).  0    amLODIPine (NORVASC) 10 MG tablet Take 1 tablet (10 mg total) by mouth every evening. 90 tablet 3    atorvastatin (LIPITOR) 80 MG tablet Take 1 tablet by mouth once daily 90 tablet 0    digoxin (LANOXIN) 125 mcg tablet Take 1 tablet (0.125 mg total) by mouth once daily. 90 tablet 0    docusate sodium (COLACE) 100 MG capsule Take 100 mg by mouth Daily.      furosemide (LASIX) 80 MG tablet Take 1 tablet (80 mg total) by mouth 2 (two) times daily. 60 tablet 11    lisinopriL (PRINIVIL,ZESTRIL) 20 MG tablet Take 1/2 tablet (10 mg total) by mouth every morning and take 1 tablet (20 mg total) every evening. 90 tablet 2    lisinopriL 10 MG tablet Take 1 tablet by mouth twice daily 180 tablet 0    magnesium oxide (MAG-OX) 400 mg (241.3 mg magnesium) tablet Take 1 tablet (400 mg total) by mouth 3 (three) times daily. 90 tablet 11    mirtazapine (REMERON) 15 MG tablet Take 1 tablet (15 mg total) by mouth every evening. 30 tablet 11    polyethylene glycol (GLYCOLAX) 17 gram PwPk Mix 1 packet (17 g) with liquid and take by mouth daily as needed. 10 packet 0    SITagliptin (JANUVIA) 100 MG Tab  "Take 1 tablet (100 mg total) by mouth once daily. 90 tablet 3    spironolactone (ALDACTONE) 25 MG tablet Take 1 tablet (25 mg total) by mouth once daily. 30 tablet 11    warfarin (COUMADIN) 5 MG tablet Take 1 tablet (5 mg total) by mouth Daily. 90 tablet 3     No current facility-administered medications on file prior to visit.       REVIEW OF SYSTEMS  Review of Systems   Constitutional: Positive for activity change, appetite change, fatigue and unexpected weight change. Negative for fever.        Recent night sweats   Respiratory: Negative for chest tightness and shortness of breath.    Cardiovascular: Negative for chest pain, palpitations and leg swelling.   Gastrointestinal: Negative for abdominal distention, blood in stool, nausea and vomiting.   Genitourinary: Negative for difficulty urinating and hematuria.   Neurological: Positive for light-headedness and headaches. Negative for syncope.   Hematological: Bruises/bleeds easily.       PHYSICAL EXAM:    Vitals:    07/13/22 1004 07/13/22 1134   BP: (!) 70/0 (!) 58/0   BP Location: Right arm Left arm   Patient Position: Sitting Standing   Temp: 98.2 °F (36.8 °C)    TempSrc: Oral    Weight: 87.1 kg (192 lb)    Height: 6' 2" (1.88 m)     Body mass index is 24.65 kg/m².    GENERAL: Alert, NAD   HEENT: Anicteric sclerae  NECK: JVP not visible above level of clavicle while sitting upright or reclining 45 degrees without hepatojugular reflux  CARDIOVASCULAR: VAD hum present  PULMONARY: Lungs clear to auscultation bilaterally  ABDOMEN: Soft, nontender, nondistended. Driveline site c/d/i. No guarding, no rebound, no hepatomegaly  EXTREMITIES: Warm. No clubbing, cyanosis or edema. No pre-sacral edema  NEUROLOGIC: No focal deficits    LABS:    BMP  Lab Results   Component Value Date     (L) 07/13/2022    K 5.0 07/13/2022    CL 96 07/13/2022    CO2 29 07/13/2022    BUN 19 07/13/2022    CREATININE 1.5 (H) 07/13/2022    CALCIUM 9.8 07/13/2022    ANIONGAP 8 07/13/2022 "    ESTGFRAFRICA 55.3 (A) 07/13/2022    EGFRNONAA 47.8 (A) 07/13/2022       Magnesium   Date Value Ref Range Status   07/13/2022 2.5 1.6 - 2.6 mg/dL Final       Lab Results   Component Value Date    WBC 11.86 07/13/2022    HGB 10.6 (L) 07/13/2022    HCT 33.5 (L) 07/13/2022    MCV 87 07/13/2022     07/13/2022       Lab Results   Component Value Date    INR 1.7 (H) 07/13/2022    INR 1.8 07/11/2022    INR 1.6 07/07/2022       BNP   Date Value Ref Range Status   07/13/2022 107 (H) 0 - 99 pg/mL Final     Comment:     Values of less than 100 pg/ml are consistent with non-CHF populations.   05/11/2022 910 (H) 0 - 99 pg/mL Final     Comment:     Values of less than 100 pg/ml are consistent with non-CHF populations.   03/25/2022 915 (H) 0 - 99 pg/mL Final     Comment:     Values of less than 100 pg/ml are consistent with non-CHF populations.       LD   Date Value Ref Range Status   07/13/2022 178 110 - 260 U/L Final     Comment:     Results are increased in hemolyzed samples.   05/11/2022 237 110 - 260 U/L Final     Comment:     Results are increased in hemolyzed samples.   03/26/2022 309 (H) 110 - 260 U/L Final     Comment:     Results are increased in hemolyzed samples.       IMPRESSION:    NYHA Class II  ACC/AHA Stage D  Garces profile A    1. LVAD (left ventricular assist device) present    2. Heart replaced by heart assist device    3. Chronic combined systolic and diastolic heart failure    4. Coronary artery disease involving native coronary artery of native heart without angina pectoris    5. NICM (nonischemic cardiomyopathy)    6. ICD (implantable cardioverter-defibrillator) in place    7. Essential hypertension    8. PSVT (paroxysmal supraventricular tachycardia)    9. Weight loss    10. Fall, sequela        PLAN:    Due falls and orthostatic BP with lightheadedness, reduce lisinopril to 5 mg once a day and reduce amlodipine to 5 mg once a day. We will also stop  Lasix and change to 40 mg to take only as  needed if weight goes up by >3 lbs in one day or >5 lbs in one week. Repeat labs locally next week given K 5.0 today with above changes.    Given weight loss with recent night sweats and low appetite, will get CT chest/abdomen/pelvis.     Recommend 2 gram sodium restriction and 1500 mL fluid restriction.  Encourage physical activity with graded exercise program.  Requested patient to weigh themselves daily, and to notify us if their weight increases by more than 3 lbs in 1 day or 5 lbs in 1 week.   Pt to call us withmore shortness of breath, swelling or unexpected weight changes, fever, chills, alarms, bloody or black bowel movements, or drainage from the driveline    Additionally, the patient has a patient centered goal of being able to improve their quality of life     F/U 1 month with clinic visit, labs and interrogation    Short-term goals: improve quality of life, appetite, prevent falls  Long-term goals: prolong meaningful life, currently DT VAD  Barriers: hypotension, weight loss    UNOS Patient Status  Functional Status: 80% - Normal activity with effort: some symptoms of disease  Physical Capacity: Limited Mobility  Working for Income: No  If no, reason not working: Unknown      Electronically signed by:   Dana Mendoza   07/12/2022 5:28 PM

## 2022-07-12 NOTE — TELEPHONE ENCOUNTER
Spoke with patient regarding equipment maintenance.  Asked patient to bring MPU and UBC equipment with them to next appt please.  Patient verbalized understanding and agreement.  It is medically necessary to ensure patient has properly functioning equipment and wearables to prevent infection, injury or death to patient.

## 2022-07-13 ENCOUNTER — CLINICAL SUPPORT (OUTPATIENT)
Dept: TRANSPLANT | Facility: CLINIC | Age: 67
End: 2022-07-13
Payer: MEDICARE

## 2022-07-13 ENCOUNTER — ANTI-COAG VISIT (OUTPATIENT)
Dept: CARDIOLOGY | Facility: CLINIC | Age: 67
End: 2022-07-13
Payer: MEDICARE

## 2022-07-13 ENCOUNTER — LAB VISIT (OUTPATIENT)
Dept: LAB | Facility: HOSPITAL | Age: 67
End: 2022-07-13
Attending: INTERNAL MEDICINE
Payer: MEDICARE

## 2022-07-13 ENCOUNTER — HOSPITAL ENCOUNTER (OUTPATIENT)
Dept: PULMONOLOGY | Facility: CLINIC | Age: 67
Discharge: HOME | End: 2022-07-13
Payer: MEDICARE

## 2022-07-13 ENCOUNTER — OFFICE VISIT (OUTPATIENT)
Dept: TRANSPLANT | Facility: CLINIC | Age: 67
End: 2022-07-13
Payer: MEDICARE

## 2022-07-13 VITALS — WEIGHT: 178 LBS | HEIGHT: 74 IN | BODY MASS INDEX: 22.84 KG/M2

## 2022-07-13 VITALS — SYSTOLIC BLOOD PRESSURE: 58 MMHG | WEIGHT: 192 LBS | BODY MASS INDEX: 24.64 KG/M2 | TEMPERATURE: 98 F | HEIGHT: 74 IN

## 2022-07-13 DIAGNOSIS — Z95.811 LVAD (LEFT VENTRICULAR ASSIST DEVICE) PRESENT: Primary | ICD-10-CM

## 2022-07-13 DIAGNOSIS — R63.4 WEIGHT LOSS: ICD-10-CM

## 2022-07-13 DIAGNOSIS — I42.8 NICM (NONISCHEMIC CARDIOMYOPATHY): ICD-10-CM

## 2022-07-13 DIAGNOSIS — I47.10 PSVT (PAROXYSMAL SUPRAVENTRICULAR TACHYCARDIA): ICD-10-CM

## 2022-07-13 DIAGNOSIS — I10 ESSENTIAL HYPERTENSION: ICD-10-CM

## 2022-07-13 DIAGNOSIS — Z95.811 LVAD (LEFT VENTRICULAR ASSIST DEVICE) PRESENT: ICD-10-CM

## 2022-07-13 DIAGNOSIS — E78.5 HYPERLIPIDEMIA, UNSPECIFIED HYPERLIPIDEMIA TYPE: ICD-10-CM

## 2022-07-13 DIAGNOSIS — Z95.811 HEART REPLACED BY HEART ASSIST DEVICE: ICD-10-CM

## 2022-07-13 DIAGNOSIS — I50.42 CHRONIC COMBINED SYSTOLIC AND DIASTOLIC HEART FAILURE: ICD-10-CM

## 2022-07-13 DIAGNOSIS — I25.10 CORONARY ARTERY DISEASE INVOLVING NATIVE CORONARY ARTERY OF NATIVE HEART WITHOUT ANGINA PECTORIS: ICD-10-CM
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
## 2022-07-13 DIAGNOSIS — Z95.810 ICD (IMPLANTABLE CARDIOVERTER-DEFIBRILLATOR) IN PLACE: ICD-10-CM

## 2022-07-13 PROBLEM — J96.00 ACUTE RESPIRATORY FAILURE REQUIRING REINTUBATION: Status: RESOLVED | Noted: 2022-01-28 | Resolved: 2022-07-13

## 2022-07-13 LAB
ALBUMIN SERPL BCP-MCNC: 3.4 G/DL (ref 3.5–5.2)
ALP SERPL-CCNC: 136 U/L (ref 55–135)
ALT SERPL W/O P-5'-P-CCNC: 8 U/L (ref 10–44)
ANION GAP SERPL CALC-SCNC: 8 MMOL/L (ref 8–16)
AST SERPL-CCNC: 16 U/L (ref 10–40)
BASOPHILS # BLD AUTO: 0.03 K/UL (ref 0–0.2)
BASOPHILS NFR BLD: 0.3 % (ref 0–1.9)
BILIRUB DIRECT SERPL-MCNC: 0.8 MG/DL (ref 0.1–0.3)
BILIRUB SERPL-MCNC: 1.7 MG/DL (ref 0.1–1)
BNP SERPL-MCNC: 107 PG/ML (ref 0–99)
BUN SERPL-MCNC: 19 MG/DL (ref 8–23)
CALCIUM SERPL-MCNC: 9.8 MG/DL (ref 8.7–10.5)
CHLORIDE SERPL-SCNC: 96 MMOL/L (ref 95–110)
CHOLEST SERPL-MCNC: 129 MG/DL (ref 120–199)
CHOLEST/HDLC SERPL: 2.7 {RATIO} (ref 2–5)
CO2 SERPL-SCNC: 29 MMOL/L (ref 23–29)
CREAT SERPL-MCNC: 1.5 MG/DL (ref 0.5–1.4)
CRP SERPL-MCNC: 32.4 MG/L (ref 0–8.2)
DIFFERENTIAL METHOD: ABNORMAL
EOSINOPHIL # BLD AUTO: 0.2 K/UL (ref 0–0.5)
EOSINOPHIL NFR BLD: 1.7 % (ref 0–8)
ERYTHROCYTE [DISTWIDTH] IN BLOOD BY AUTOMATED COUNT: 15.9 % (ref 11.5–14.5)
EST. GFR  (AFRICAN AMERICAN): 55.3 ML/MIN/1.73 M^2
EST. GFR  (NON AFRICAN AMERICAN): 47.8 ML/MIN/1.73 M^2
GLUCOSE SERPL-MCNC: 130 MG/DL (ref 70–110)
HCT VFR BLD AUTO: 33.5 % (ref 40–54)
HDLC SERPL-MCNC: 47 MG/DL (ref 40–75)
HDLC SERPL: 36.4 % (ref 20–50)
HGB BLD-MCNC: 10.6 G/DL (ref 14–18)
IMM GRANULOCYTES # BLD AUTO: 0.03 K/UL (ref 0–0.04)
IMM GRANULOCYTES NFR BLD AUTO: 0.3 % (ref 0–0.5)
INR PPP: 1.7 (ref 0.8–1.2)
LDH SERPL L TO P-CCNC: 178 U/L (ref 110–260)
LDLC SERPL CALC-MCNC: 65.2 MG/DL (ref 63–159)
LYMPHOCYTES # BLD AUTO: 1.9 K/UL (ref 1–4.8)
LYMPHOCYTES NFR BLD: 15.9 % (ref 18–48)
MAGNESIUM SERPL-MCNC: 2.5 MG/DL (ref 1.6–2.6)
MCH RBC QN AUTO: 27.5 PG (ref 27–31)
MCHC RBC AUTO-ENTMCNC: 31.6 G/DL (ref 32–36)
MCV RBC AUTO: 87 FL (ref 82–98)
MONOCYTES # BLD AUTO: 1 K/UL (ref 0.3–1)
MONOCYTES NFR BLD: 8.1 % (ref 4–15)
NEUTROPHILS # BLD AUTO: 8.8 K/UL (ref 1.8–7.7)
NEUTROPHILS NFR BLD: 73.7 % (ref 38–73)
NONHDLC SERPL-MCNC: 82 MG/DL
NRBC BLD-RTO: 0 /100 WBC
PHOSPHATE SERPL-MCNC: 3.6 MG/DL (ref 2.7–4.5)
PLATELET # BLD AUTO: 214 K/UL (ref 150–450)
PMV BLD AUTO: 10.4 FL (ref 9.2–12.9)
POTASSIUM SERPL-SCNC: 5 MMOL/L (ref 3.5–5.1)
PREALB SERPL-MCNC: 13 MG/DL (ref 20–43)
PROT SERPL-MCNC: 9.6 G/DL (ref 6–8.4)
PROTHROMBIN TIME: 17 SEC (ref 9–12.5)
RBC # BLD AUTO: 3.85 M/UL (ref 4.6–6.2)
SODIUM SERPL-SCNC: 133 MMOL/L (ref 136–145)
TRIGL SERPL-MCNC: 84 MG/DL (ref 30–150)
WBC # BLD AUTO: 11.86 K/UL (ref 3.9–12.7)

## 2022-07-13 PROCEDURE — 1100F PR PT FALLS ASSESS DOC 2+ FALLS/FALL W/INJURY/YR: ICD-10-PCS | Mod: CPTII,S$GLB,, | Performed by: INTERNAL MEDICINE

## 2022-07-13 PROCEDURE — 1100F PTFALLS ASSESS-DOCD GE2>/YR: CPT | Mod: CPTII,S$GLB,, | Performed by: INTERNAL MEDICINE

## 2022-07-13 PROCEDURE — 82248 BILIRUBIN DIRECT: CPT | Performed by: INTERNAL MEDICINE

## 2022-07-13 PROCEDURE — 1157F PR ADVANCE CARE PLAN OR EQUIV PRESENT IN MEDICAL RECORD: ICD-10-PCS | Mod: CPTII,S$GLB,, | Performed by: INTERNAL MEDICINE

## 2022-07-13 PROCEDURE — 1159F MED LIST DOCD IN RCRD: CPT | Mod: CPTII,S$GLB,, | Performed by: INTERNAL MEDICINE

## 2022-07-13 PROCEDURE — 93750 PR INTERROGATE VENT ASSIST DEV, IN PERSON, W PHYSICIAN ANALYSIS: ICD-10-PCS | Mod: S$GLB,,, | Performed by: INTERNAL MEDICINE

## 2022-07-13 PROCEDURE — 3051F HG A1C>EQUAL 7.0%<8.0%: CPT | Mod: CPTII,S$GLB,, | Performed by: INTERNAL MEDICINE

## 2022-07-13 PROCEDURE — 3288F PR FALLS RISK ASSESSMENT DOCUMENTED: ICD-10-PCS | Mod: CPTII,S$GLB,, | Performed by: INTERNAL MEDICINE

## 2022-07-13 PROCEDURE — 94618 PULMONARY STRESS TESTING: ICD-10-PCS | Mod: S$GLB,,, | Performed by: INTERNAL MEDICINE

## 2022-07-13 PROCEDURE — 3288F FALL RISK ASSESSMENT DOCD: CPT | Mod: CPTII,S$GLB,, | Performed by: INTERNAL MEDICINE

## 2022-07-13 PROCEDURE — 84100 ASSAY OF PHOSPHORUS: CPT | Performed by: INTERNAL MEDICINE

## 2022-07-13 PROCEDURE — 80061 LIPID PANEL: CPT | Performed by: INTERNAL MEDICINE

## 2022-07-13 PROCEDURE — 86140 C-REACTIVE PROTEIN: CPT | Performed by: INTERNAL MEDICINE

## 2022-07-13 PROCEDURE — 3008F BODY MASS INDEX DOCD: CPT | Mod: CPTII,S$GLB,, | Performed by: INTERNAL MEDICINE

## 2022-07-13 PROCEDURE — 99214 PR OFFICE/OUTPT VISIT, EST, LEVL IV, 30-39 MIN: ICD-10-PCS | Mod: S$GLB,,, | Performed by: INTERNAL MEDICINE

## 2022-07-13 PROCEDURE — 1160F RVW MEDS BY RX/DR IN RCRD: CPT | Mod: CPTII,S$GLB,, | Performed by: INTERNAL MEDICINE

## 2022-07-13 PROCEDURE — 84134 ASSAY OF PREALBUMIN: CPT | Performed by: INTERNAL MEDICINE

## 2022-07-13 PROCEDURE — 83615 LACTATE (LD) (LDH) ENZYME: CPT | Performed by: INTERNAL MEDICINE

## 2022-07-13 PROCEDURE — 94618 PULMONARY STRESS TESTING: CPT | Mod: S$GLB,,, | Performed by: INTERNAL MEDICINE

## 2022-07-13 PROCEDURE — 93750 INTERROGATION VAD IN PERSON: CPT | Mod: S$GLB,,, | Performed by: INTERNAL MEDICINE

## 2022-07-13 PROCEDURE — 1126F AMNT PAIN NOTED NONE PRSNT: CPT | Mod: CPTII,S$GLB,, | Performed by: INTERNAL MEDICINE

## 2022-07-13 PROCEDURE — 4010F ACE/ARB THERAPY RXD/TAKEN: CPT | Mod: CPTII,S$GLB,, | Performed by: INTERNAL MEDICINE

## 2022-07-13 PROCEDURE — 83735 ASSAY OF MAGNESIUM: CPT | Performed by: INTERNAL MEDICINE

## 2022-07-13 PROCEDURE — 80053 COMPREHEN METABOLIC PANEL: CPT | Performed by: INTERNAL MEDICINE

## 2022-07-13 PROCEDURE — 99999 PR PBB SHADOW E&M-EST. PATIENT-LVL III: CPT | Mod: PBBFAC,,, | Performed by: INTERNAL MEDICINE

## 2022-07-13 PROCEDURE — 85610 PROTHROMBIN TIME: CPT | Performed by: INTERNAL MEDICINE

## 2022-07-13 PROCEDURE — 1160F PR REVIEW ALL MEDS BY PRESCRIBER/CLIN PHARMACIST DOCUMENTED: ICD-10-PCS | Mod: CPTII,S$GLB,, | Performed by: INTERNAL MEDICINE

## 2022-07-13 PROCEDURE — 1157F ADVNC CARE PLAN IN RCRD: CPT | Mod: CPTII,S$GLB,, | Performed by: INTERNAL MEDICINE

## 2022-07-13 PROCEDURE — 99214 OFFICE O/P EST MOD 30 MIN: CPT | Mod: S$GLB,,, | Performed by: INTERNAL MEDICINE

## 2022-07-13 PROCEDURE — 3008F PR BODY MASS INDEX (BMI) DOCUMENTED: ICD-10-PCS | Mod: CPTII,S$GLB,, | Performed by: INTERNAL MEDICINE

## 2022-07-13 PROCEDURE — 85025 COMPLETE CBC W/AUTO DIFF WBC: CPT | Performed by: INTERNAL MEDICINE

## 2022-07-13 PROCEDURE — 1159F PR MEDICATION LIST DOCUMENTED IN MEDICAL RECORD: ICD-10-PCS | Mod: CPTII,S$GLB,, | Performed by: INTERNAL MEDICINE

## 2022-07-13 PROCEDURE — 99999 PR PBB SHADOW E&M-EST. PATIENT-LVL III: ICD-10-PCS | Mod: PBBFAC,,, | Performed by: INTERNAL MEDICINE

## 2022-07-13 PROCEDURE — 4010F PR ACE/ARB THEARPY RXD/TAKEN: ICD-10-PCS | Mod: CPTII,S$GLB,, | Performed by: INTERNAL MEDICINE

## 2022-07-13 PROCEDURE — 1126F PR PAIN SEVERITY QUANTIFIED, NO PAIN PRESENT: ICD-10-PCS | Mod: CPTII,S$GLB,, | Performed by: INTERNAL MEDICINE

## 2022-07-13 PROCEDURE — 3051F PR MOST RECENT HEMOGLOBIN A1C LEVEL 7.0 - < 8.0%: ICD-10-PCS | Mod: CPTII,S$GLB,, | Performed by: INTERNAL MEDICINE

## 2022-07-13 PROCEDURE — 83880 ASSAY OF NATRIURETIC PEPTIDE: CPT | Performed by: INTERNAL MEDICINE

## 2022-07-13 PROCEDURE — 36415 COLL VENOUS BLD VENIPUNCTURE: CPT | Performed by: INTERNAL MEDICINE

## 2022-07-13 RX ORDER — AMLODIPINE BESYLATE 10 MG/1
5 TABLET ORAL DAILY
Qty: 90 TABLET | Refills: 3 | Status: SHIPPED | OUTPATIENT
Start: 2022-07-13 | End: 2022-08-11 | Stop reason: SDUPTHER

## 2022-07-13 RX ORDER — LISINOPRIL 5 MG/1
5 TABLET ORAL 2 TIMES DAILY
Qty: 90 TABLET | Refills: 3 | Status: SHIPPED | OUTPATIENT
Start: 2022-07-13 | End: 2022-08-11 | Stop reason: SDUPTHER

## 2022-07-13 RX ORDER — FUROSEMIDE 40 MG/1
40 TABLET ORAL DAILY
Qty: 30 TABLET | Refills: 11 | Status: SHIPPED | OUTPATIENT
Start: 2022-07-13 | End: 2023-12-11 | Stop reason: SDUPTHER

## 2022-07-13 NOTE — PROCEDURES
Rocco France is a 66 y.o.  male patient, who presents for a 6 minute walk test ordered by MD Chleo.  The diagnosis is  (LVAD); Cardiomyopathy.  The patient's BMI is 22.8 kg/m2.  Predicted distance (lower limit of normal) is 401.05 meters.      Test Results:    The test was completed without stopping.  The total time walked was 360 seconds.  During walking, the patient reported:  Lightheadedness.  The patient used no assistive devices during testing.     07/13/2022---------Distance: 259.08 meters (850 feet)     O2 Sat % Supplemental Oxygen Heart Rate Blood Pressure Heath Scale   Pre-exercise  (Resting) 99 % Room Air 35 bpm Unable to obtain 0   During Exercise 96 % Room Air 67 bpm Unable to obtain 2   Post-exercise  (Recovery) 98 % Room Air  56 bpm       Recovery Time:  199 seconds               The patient walked the first 15 seconds in 6.01 seconds.    Performing nurse/tech: Jaquan CALABRESE      PREVIOUS STUDY:   03/10/2022---------Distance: 259.08 meters (850 feet)       O2 Sat % Supplemental Oxygen Heart Rate Blood Pressure Heath Scale   Pre-exercise  (Resting) 100 % Room Air 99 bpm Unable to obtain 0   During Exercise 99 % Room Air 72 bpm Unable to obtain 2   Post-exercise  (Recovery) 100 % Room Air  68 bpm          Recovery Time: 69 seconds               The patient walked the first 15 seconds in 6.00 seconds.      CLINICAL INTERPRETATION:  Six minute walk distance is 259.08 meters (850 feet) with light dyspnea.  During exercise, there was no significant desaturation while breathing room air.  Heart rate increased significantly with walking.  Bradycardia was present prior to exercise.  The patient reported non-pulmonary symptoms during exercise.  Significant exercise impairment is likely due to cardiovascular causes and subjective symptoms.  The patient did complete the study, walking 360 seconds of the 360 second test.  Since the previous study in March 2022, exercise capacity is unchanged.  Based  upon age and body mass index, exercise capacity is less than predicted.

## 2022-07-13 NOTE — PROGRESS NOTES
Preventative maintenance performed on patient's [id number] and [id number]-23101, thus UBC checklist completed. Patient educated on battery calibrations as they are close to be calibrating. This is medically necessary for the proper function of the LVAD system.    After discussing equipment with patient, patient needed new accessory kit and consolidation bag.   Lot#[id number] for accessory kit and LOT#[id number] for consolidation abg given to patient today in clinic and patient educated on proper use of item and maintenance. MCS connect updated and VAD snapshot updated today.It is medically necessary to ensure patient has properly functioning equipment and wearables to prevent infection, injury or death to patient.

## 2022-07-13 NOTE — PROGRESS NOTES
Patient wife questioned and confirmed dose . Patient has been dehydrated and will be increasing fluids - per wife stated.  No other changes.

## 2022-07-13 NOTE — LETTER
July 13, 2022        Teresa Mendoza  [address]  [address]  [place] [address]  Phone: [phone]  Fax: [phone]             [id number]  [address]  [place] [address]  Phone: [phone]   Patient: Rocco France   MR Number: [id number]   YOB: 1955   Date of Visit: 7/13/2022       Dear Dr. Teresa Mendoza    Thank you for referring Rocco France to me for evaluation. Attached you will find relevant portions of my assessment and plan of care.    If you have questions, please do not hesitate to call me. I look forward to following Rocco France along with you.    Sincerely,    Dana Mendoza, DO    Enclosure    If you would like to receive this communication electronically, please contact [email or web address] or [phone] to request [employer] [facility] access.    [employer] [facility] is a tool which provides read-only access to select patient information with whom you have a relationship. Its easy to use and provides real time access to review your patients record including encounter summaries, notes, results, and demographic information.    If you feel you have received this communication in error or would no longer like to receive these types of communications, please e-mail [email or web address]

## 2022-07-13 NOTE — PATIENT INSTRUCTIONS
Due to your falls and low BP, we are going to reduce your lisinopril to 5 mg once a day and reduce your amlodipine to 5 mg once a day. We will also stop your Lasix and change that to 40 mg to take only as needed if your weight goes up by >3 lbs in one day or >5 lbs in one week.    Let us get a CT scan of your chest/abdomen/pelvis to make sure we are not missing anything with your weight loss and reduced appetite.     We will see you back in 1 month with these changes and repeat labs locally next week.
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
## 2022-07-14 ENCOUNTER — TELEPHONE (OUTPATIENT)
Dept: TRANSPLANT | Facility: CLINIC | Age: 67
End: 2022-07-14
Payer: MEDICARE

## 2022-07-14 NOTE — TELEPHONE ENCOUNTER
Faxed outpatient lab order for bnp cmp due to med changes to     [facility]  [phone]  // [facility] ph [phone] fax [phone]) results Lab - [phone]

## 2022-07-18 ENCOUNTER — ANTI-COAG VISIT (OUTPATIENT)
Dept: CARDIOLOGY | Facility: CLINIC | Age: 67
End: 2022-07-18
Payer: MEDICARE

## 2022-07-18 DIAGNOSIS — Z95.811 LVAD (LEFT VENTRICULAR ASSIST DEVICE) PRESENT: Primary | ICD-10-CM

## 2022-07-18 LAB — INR PPP: 1.8

## 2022-07-18 PROCEDURE — 93793 ANTICOAG MGMT PT WARFARIN: CPT | Mod: S$GLB,,,

## 2022-07-18 PROCEDURE — 93793 PR ANTICOAGULANT MGMT FOR PT TAKING WARFARIN: ICD-10-PCS | Mod: S$GLB,,,

## 2022-07-18 NOTE — PROGRESS NOTES
Patient was called and given day by day dosage of warfarin, he verbalized understanding, order was faxed to [facility]

## 2022-07-18 NOTE — PROGRESS NOTES
Patient was called and given lab result he verified warfarin: 7.5mg last Wednesday then 5mg daily, verified correct tablet size, 7/17 coughed up some blood, had slight nose bleed when picking his nose, reports slight headache--seeing  8/09, stopped Furosemide last week, had some almonds

## 2022-07-18 NOTE — PROGRESS NOTES
INR not at goal. Medications, chart, and patient findings reviewed. Reports coughing up blood which may have been the result of the nosebleed. Will advise to contact us or VAD team if this continues. See calendar for adjustments to dose and follow up plan.

## 2022-07-21 ENCOUNTER — ANTI-COAG VISIT (OUTPATIENT)
Dept: CARDIOLOGY | Facility: CLINIC | Age: 67
End: 2022-07-21
Payer: MEDICARE

## 2022-07-21 DIAGNOSIS — Z95.811 LVAD (LEFT VENTRICULAR ASSIST DEVICE) PRESENT: Primary | ICD-10-CM

## 2022-07-21 LAB — INR PPP: 2.7 (ref 2–2.5)

## 2022-07-21 PROCEDURE — 93793 PR ANTICOAGULANT MGMT FOR PT TAKING WARFARIN: ICD-10-PCS | Mod: S$GLB,,,

## 2022-07-21 PROCEDURE — 93793 ANTICOAG MGMT PT WARFARIN: CPT | Mod: S$GLB,,,

## 2022-07-22 ENCOUNTER — TELEPHONE (OUTPATIENT)
Dept: TRANSPLANT | Facility: CLINIC | Age: 67
End: 2022-07-22
Payer: MEDICARE

## 2022-07-22 LAB
EXT ALBUMIN: 3
EXT ALT: 10
EXT AST: 19
EXT BILIRUBIN TOTAL: 1
EXT BUN: 19
EXT CALCIUM: 9.2
EXT CHLORIDE: 101
EXT CREATININE: 1.3 MG/DL
EXT GLUCOSE: 130
EXT NT PROBNP: 2404
EXT POTASSIUM: 4.2
EXT PROTEIN TOTAL: 9.5
EXT SODIUM: 136 MMOL/L

## 2022-07-22 NOTE — TELEPHONE ENCOUNTER
----- Message from Cira Izquierdo LPN sent at 7/14/2022  1:47 PM CDT -----  Regarding: BNP , CMP med changes  To be drawn on 7/18/22  [facility]  [phone]  // [facility] ph [phone] fax [phone]) results Lab - [phone]

## 2022-07-22 NOTE — TELEPHONE ENCOUNTER
Contacted  to obtain lab results    Spoke with Samaria whom transferred me to Florencia whom noted fax number and stated she will contact lab for results and fax them to office    Understanding verbalized     This information will be relayed to VAD coordinator.

## 2022-07-25 ENCOUNTER — ANTI-COAG VISIT (OUTPATIENT)
Dept: CARDIOLOGY | Facility: CLINIC | Age: 67
End: 2022-07-25
Payer: MEDICARE

## 2022-07-25 ENCOUNTER — TELEPHONE (OUTPATIENT)
Dept: TRANSPLANT | Facility: CLINIC | Age: 67
End: 2022-07-25
Payer: MEDICARE

## 2022-07-25 DIAGNOSIS — Z95.811 LVAD (LEFT VENTRICULAR ASSIST DEVICE) PRESENT: Primary | ICD-10-CM

## 2022-07-25 LAB — INR PPP: 3.1

## 2022-07-25 PROCEDURE — 93793 PR ANTICOAGULANT MGMT FOR PT TAKING WARFARIN: ICD-10-PCS | Mod: S$GLB,,,

## 2022-07-25 PROCEDURE — 93793 ANTICOAG MGMT PT WARFARIN: CPT | Mod: S$GLB,,,

## 2022-07-25 NOTE — TELEPHONE ENCOUNTER
----- Message from Cira Izquierdo LPN sent at 7/14/2022  1:47 PM CDT -----  Regarding: BNP , CMP med changes  To be drawn on 7/18/22  [facility]  [phone]  // [facility] ph [phone] fax [phone]) results Lab - [phone]    
Contacted [facility]   Frances answered line transferred to Kierra whom did not answer left a voice mail to call back regarding lab results that has yet to be received.   
regular rate and rhythm
no headache

## 2022-07-25 NOTE — PROGRESS NOTES
Questioned patient regarding increased INR. Patient reported an improper dose of 5mg of warfarin on 7/22/25.  The proper dose was 2.5mg.  In addition, the patient reported that his MD recently  reduced his lisinopril and lasix dosages to a PRN status due to orthostatic pressures. No other changes reported at this time.

## 2022-07-26 ENCOUNTER — TELEPHONE (OUTPATIENT)
Dept: TRANSPLANT | Facility: CLINIC | Age: 67
End: 2022-07-26
Payer: MEDICARE

## 2022-07-26 NOTE — TELEPHONE ENCOUNTER
----- Message from Cira Izquierdo LPN sent at 7/14/2022  1:47 PM CDT -----  Regarding: BNP , CMP med changes  To be drawn on 7/18/22  [facility]  [phone]  // [facility] ph [phone] fax [phone]) results Lab - [phone]

## 2022-07-28 ENCOUNTER — ANTI-COAG VISIT (OUTPATIENT)
Dept: CARDIOLOGY | Facility: CLINIC | Age: 67
End: 2022-07-28
Payer: MEDICARE

## 2022-07-28 DIAGNOSIS — Z95.811 LVAD (LEFT VENTRICULAR ASSIST DEVICE) PRESENT: Primary | ICD-10-CM

## 2022-07-28 LAB — INR PPP: 2.4

## 2022-07-28 PROCEDURE — 93793 PR ANTICOAGULANT MGMT FOR PT TAKING WARFARIN: ICD-10-PCS | Mod: S$GLB,,,

## 2022-07-28 PROCEDURE — 93793 ANTICOAG MGMT PT WARFARIN: CPT | Mod: S$GLB,,,

## 2022-08-01 ENCOUNTER — TELEPHONE (OUTPATIENT)
Dept: TRANSPLANT | Facility: CLINIC | Age: 67
End: 2022-08-01
Payer: MEDICARE

## 2022-08-01 ENCOUNTER — ANTI-COAG VISIT (OUTPATIENT)
Dept: CARDIOLOGY | Facility: CLINIC | Age: 67
End: 2022-08-01
Payer: MEDICARE

## 2022-08-01 DIAGNOSIS — Z95.811 LVAD (LEFT VENTRICULAR ASSIST DEVICE) PRESENT: Primary | ICD-10-CM

## 2022-08-01 LAB — INR PPP: 3

## 2022-08-01 PROCEDURE — 93793 PR ANTICOAGULANT MGMT FOR PT TAKING WARFARIN: ICD-10-PCS | Mod: S$GLB,,,

## 2022-08-01 PROCEDURE — 93793 ANTICOAG MGMT PT WARFARIN: CPT | Mod: S$GLB,,,

## 2022-08-01 NOTE — PROGRESS NOTES
Meme questioned. Meme stated patient has been taking OTC Tylenol(uncertain of dose) for headaches for 2 days , decreased activity level due to headaches, has MRI scheduled on 8/11/22 for headaches and history of brain bleed, and has been stressed. Meme verified correct Warfarin dose as 7.5mg on 7/27 and 5mg all other days. No other changes noted.

## 2022-08-01 NOTE — PROGRESS NOTES
Meme was given day by day dose of Warfarin and redraw lab date, and instructions to call LVAD or go to ER if patient headache becomes more severe or develop symptoms from the headache, verbalized understanding. Meme verified correct Warfarin dose and redraw date.

## 2022-08-01 NOTE — TELEPHONE ENCOUNTER
F/u regarding message from coumadin clinic that patient has had h/a unrelieved by tylenol for two days. Left message on wife's phone that patient should go to ER immediately for evaluation. Unable to leave message on patients phone.

## 2022-08-01 NOTE — PROGRESS NOTES
----- Message from Sabino Adams MD sent at 7/29/2022  3:40 PM CDT -----  Review with me on 8/1/22   INR at goal. Medications and chart reviewed. No changes noted to necessitate adjustment of warfarin or follow-up plan. See calendar.

## 2022-08-03 ENCOUNTER — ANTI-COAG VISIT (OUTPATIENT)
Dept: CARDIOLOGY | Facility: CLINIC | Age: 67
End: 2022-08-03
Payer: MEDICARE

## 2022-08-03 DIAGNOSIS — Z95.811 LVAD (LEFT VENTRICULAR ASSIST DEVICE) PRESENT: Primary | ICD-10-CM

## 2022-08-03 LAB — INR PPP: 2.5

## 2022-08-03 PROCEDURE — 93793 ANTICOAG MGMT PT WARFARIN: CPT | Mod: S$GLB,,,

## 2022-08-03 PROCEDURE — 93793 PR ANTICOAGULANT MGMT FOR PT TAKING WARFARIN: ICD-10-PCS | Mod: S$GLB,,,

## 2022-08-03 NOTE — PROGRESS NOTES
INR now at goal. Medications and chart reviewed. Will lower maintenance dose further. See calendar.

## 2022-08-08 LAB
EXT ALBUMIN: 2.9
EXT ALT: 14
EXT AST: 22
EXT BILIRUBIN TOTAL: 0.8
EXT BUN: 11
EXT CALCIUM: 9.1
EXT CHLORIDE: 103
EXT CREATININE: 0.9 MG/DL
EXT GLUCOSE: 119
EXT POTASSIUM: 4.4
EXT PROTEIN TOTAL: 9.9
EXT SODIUM: 137 MMOL/L

## 2022-08-09 ENCOUNTER — ANTI-COAG VISIT (OUTPATIENT)
Dept: CARDIOLOGY | Facility: CLINIC | Age: 67
End: 2022-08-09
Payer: MEDICARE

## 2022-08-09 DIAGNOSIS — Z95.811 LVAD (LEFT VENTRICULAR ASSIST DEVICE) PRESENT: Primary | ICD-10-CM

## 2022-08-09 LAB — INR PPP: 3.3

## 2022-08-09 PROCEDURE — 93793 ANTICOAG MGMT PT WARFARIN: CPT | Mod: S$GLB,,,

## 2022-08-09 PROCEDURE — 93793 PR ANTICOAGULANT MGMT FOR PT TAKING WARFARIN: ICD-10-PCS | Mod: S$GLB,,,

## 2022-08-09 NOTE — PROGRESS NOTES
Spoke with Barbara at [facility] and she stated INR was draw at ER visit yesterday 8/8/22 and gave a verbal read as PT 30.7, INR 3.3, PTT 46.1. Barbara stated she is unable to fax results.

## 2022-08-09 NOTE — PROGRESS NOTES
Patient reports blood in mucus when blowing nose and with coughing on 8/8/22, was in ER at [facility] for headache(8/8/22) has not contacted LVAD Team regarding ER visit and severe headache, took Warfarin 2.5mg on 8/4/22 and 8/8/22, taking Tylenol 350mg 1 pill 2-3 times a day for headaches. No other changes noted.

## 2022-08-09 NOTE — PROGRESS NOTES
I spoke to patient's wife to check in on Mr. [place]. Patient is resting now. She states that his headache is almost gone. He did get a CT head that was negative. She has the disc and records to bring to clinic on this Thursday, 8/11. Verbalized to please page the VC anytime the patient has to go to the ER.

## 2022-08-11 ENCOUNTER — HOSPITAL ENCOUNTER (OUTPATIENT)
Dept: RADIOLOGY | Facility: HOSPITAL | Age: 67
Discharge: HOME | End: 2022-08-11
Attending: INTERNAL MEDICINE
Payer: MEDICARE

## 2022-08-11 ENCOUNTER — ANTI-COAG VISIT (OUTPATIENT)
Dept: CARDIOLOGY | Facility: CLINIC | Age: 67
End: 2022-08-11
Payer: MEDICARE

## 2022-08-11 ENCOUNTER — CLINICAL SUPPORT (OUTPATIENT)
Dept: TRANSPLANT | Facility: CLINIC | Age: 67
End: 2022-08-11
Payer: MEDICARE

## 2022-08-11 ENCOUNTER — OFFICE VISIT (OUTPATIENT)
Dept: TRANSPLANT | Facility: CLINIC | Age: 67
End: 2022-08-11
Attending: INTERNAL MEDICINE
Payer: MEDICARE

## 2022-08-11 VITALS — TEMPERATURE: 98 F | HEIGHT: 74 IN | SYSTOLIC BLOOD PRESSURE: 102 MMHG | BODY MASS INDEX: 21.94 KG/M2 | WEIGHT: 171 LBS

## 2022-08-11 DIAGNOSIS — I13.0 HYPERTENSIVE CARDIOVASCULAR-RENAL DISEASE, STAGE 1-4 OR UNSPECIFIED CHRONIC KIDNEY DISEASE, WITH HEART FAILURE: ICD-10-CM

## 2022-08-11 DIAGNOSIS — Z95.811 LVAD (LEFT VENTRICULAR ASSIST DEVICE) PRESENT: ICD-10-CM

## 2022-08-11 DIAGNOSIS — I10 ESSENTIAL HYPERTENSION: ICD-10-CM

## 2022-08-11 DIAGNOSIS — I50.42 CHRONIC COMBINED SYSTOLIC AND DIASTOLIC HEART FAILURE: ICD-10-CM

## 2022-08-11 DIAGNOSIS — E11.9 TYPE 2 DIABETES MELLITUS WITHOUT COMPLICATION, WITHOUT LONG-TERM CURRENT USE OF INSULIN: ICD-10-CM
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
## 2022-08-11 DIAGNOSIS — Z95.811 LVAD (LEFT VENTRICULAR ASSIST DEVICE) PRESENT: Primary | ICD-10-CM

## 2022-08-11 DIAGNOSIS — R63.4 WEIGHT LOSS: ICD-10-CM

## 2022-08-11 DIAGNOSIS — Z95.811 HEART REPLACED BY HEART ASSIST DEVICE: Primary | ICD-10-CM

## 2022-08-11 DIAGNOSIS — Z95.810 ICD (IMPLANTABLE CARDIOVERTER-DEFIBRILLATOR) IN PLACE: ICD-10-CM

## 2022-08-11 DIAGNOSIS — I42.8 NICM (NONISCHEMIC CARDIOMYOPATHY): ICD-10-CM

## 2022-08-11 PROBLEM — I50.43 ACUTE ON CHRONIC COMBINED SYSTOLIC AND DIASTOLIC HEART FAILURE: Status: RESOLVED | Noted: 2021-01-09 | Resolved: 2022-08-11

## 2022-08-11 PROCEDURE — 71250 CT THORAX DX C-: CPT | Mod: 26,,, | Performed by: RADIOLOGY

## 2022-08-11 PROCEDURE — 99214 OFFICE O/P EST MOD 30 MIN: CPT | Mod: S$GLB,,, | Performed by: INTERNAL MEDICINE

## 2022-08-11 PROCEDURE — 3051F PR MOST RECENT HEMOGLOBIN A1C LEVEL 7.0 - < 8.0%: ICD-10-PCS | Mod: CPTII,S$GLB,, | Performed by: INTERNAL MEDICINE

## 2022-08-11 PROCEDURE — 99214 PR OFFICE/OUTPT VISIT, EST, LEVL IV, 30-39 MIN: ICD-10-PCS | Mod: S$GLB,,, | Performed by: INTERNAL MEDICINE

## 2022-08-11 PROCEDURE — 1101F PR PT FALLS ASSESS DOC 0-1 FALLS W/OUT INJ PAST YR: ICD-10-PCS | Mod: CPTII,S$GLB,, | Performed by: INTERNAL MEDICINE

## 2022-08-11 PROCEDURE — 1157F PR ADVANCE CARE PLAN OR EQUIV PRESENT IN MEDICAL RECORD: ICD-10-PCS | Mod: CPTII,S$GLB,, | Performed by: INTERNAL MEDICINE

## 2022-08-11 PROCEDURE — 93750 PR INTERROGATE VENT ASSIST DEV, IN PERSON, W PHYSICIAN ANALYSIS: ICD-10-PCS | Mod: S$GLB,,, | Performed by: INTERNAL MEDICINE

## 2022-08-11 PROCEDURE — 74176 CT CHEST ABDOMEN PELVIS WITHOUT CONTRAST(XPD): ICD-10-PCS | Mod: 26,,, | Performed by: RADIOLOGY

## 2022-08-11 PROCEDURE — 1126F AMNT PAIN NOTED NONE PRSNT: CPT | Mod: CPTII,S$GLB,, | Performed by: INTERNAL MEDICINE

## 2022-08-11 PROCEDURE — 1160F RVW MEDS BY RX/DR IN RCRD: CPT | Mod: CPTII,S$GLB,, | Performed by: INTERNAL MEDICINE

## 2022-08-11 PROCEDURE — 4010F PR ACE/ARB THEARPY RXD/TAKEN: ICD-10-PCS | Mod: CPTII,S$GLB,, | Performed by: INTERNAL MEDICINE

## 2022-08-11 PROCEDURE — 93750 INTERROGATION VAD IN PERSON: CPT | Mod: S$GLB,,, | Performed by: INTERNAL MEDICINE

## 2022-08-11 PROCEDURE — 1101F PT FALLS ASSESS-DOCD LE1/YR: CPT | Mod: CPTII,S$GLB,, | Performed by: INTERNAL MEDICINE

## 2022-08-11 PROCEDURE — 1159F PR MEDICATION LIST DOCUMENTED IN MEDICAL RECORD: ICD-10-PCS | Mod: CPTII,S$GLB,, | Performed by: INTERNAL MEDICINE

## 2022-08-11 PROCEDURE — 71250 CT CHEST ABDOMEN PELVIS WITHOUT CONTRAST(XPD): ICD-10-PCS | Mod: 26,,, | Performed by: RADIOLOGY

## 2022-08-11 PROCEDURE — 3051F HG A1C>EQUAL 7.0%<8.0%: CPT | Mod: CPTII,S$GLB,, | Performed by: INTERNAL MEDICINE

## 2022-08-11 PROCEDURE — 3008F PR BODY MASS INDEX (BMI) DOCUMENTED: ICD-10-PCS | Mod: CPTII,S$GLB,, | Performed by: INTERNAL MEDICINE

## 2022-08-11 PROCEDURE — 74176 CT ABD & PELVIS W/O CONTRAST: CPT | Mod: 26,,, | Performed by: RADIOLOGY

## 2022-08-11 PROCEDURE — 99999 PR PBB SHADOW E&M-EST. PATIENT-LVL IV: ICD-10-PCS | Mod: PBBFAC,,, | Performed by: INTERNAL MEDICINE

## 2022-08-11 PROCEDURE — 4010F ACE/ARB THERAPY RXD/TAKEN: CPT | Mod: CPTII,S$GLB,, | Performed by: INTERNAL MEDICINE

## 2022-08-11 PROCEDURE — 3288F PR FALLS RISK ASSESSMENT DOCUMENTED: ICD-10-PCS | Mod: CPTII,S$GLB,, | Performed by: INTERNAL MEDICINE

## 2022-08-11 PROCEDURE — 1157F ADVNC CARE PLAN IN RCRD: CPT | Mod: CPTII,S$GLB,, | Performed by: INTERNAL MEDICINE

## 2022-08-11 PROCEDURE — 99999 PR PBB SHADOW E&M-EST. PATIENT-LVL IV: CPT | Mod: PBBFAC,,, | Performed by: INTERNAL MEDICINE

## 2022-08-11 PROCEDURE — 1126F PR PAIN SEVERITY QUANTIFIED, NO PAIN PRESENT: ICD-10-PCS | Mod: CPTII,S$GLB,, | Performed by: INTERNAL MEDICINE

## 2022-08-11 PROCEDURE — 3288F FALL RISK ASSESSMENT DOCD: CPT | Mod: CPTII,S$GLB,, | Performed by: INTERNAL MEDICINE

## 2022-08-11 PROCEDURE — 1159F MED LIST DOCD IN RCRD: CPT | Mod: CPTII,S$GLB,, | Performed by: INTERNAL MEDICINE

## 2022-08-11 PROCEDURE — 1160F PR REVIEW ALL MEDS BY PRESCRIBER/CLIN PHARMACIST DOCUMENTED: ICD-10-PCS | Mod: CPTII,S$GLB,, | Performed by: INTERNAL MEDICINE

## 2022-08-11 PROCEDURE — 3008F BODY MASS INDEX DOCD: CPT | Mod: CPTII,S$GLB,, | Performed by: INTERNAL MEDICINE

## 2022-08-11 PROCEDURE — 74176 CT ABD & PELVIS W/O CONTRAST: CPT | Mod: TC

## 2022-08-11 RX ORDER — LISINOPRIL 5 MG/1
5 TABLET ORAL DAILY
Qty: 90 TABLET | Refills: 3
Start: 2022-08-11 | End: 2023-05-25

## 2022-08-11 RX ORDER — LISINOPRIL 5 MG/1
5 TABLET ORAL 2 TIMES DAILY
Qty: 90 TABLET | Refills: 3
Start: 2022-08-11 | End: 2022-08-11 | Stop reason: SDUPTHER

## 2022-08-11 RX ORDER — AMLODIPINE BESYLATE 5 MG/1
5 TABLET ORAL DAILY
Qty: 30 TABLET | Refills: 5 | Status: SHIPPED | OUTPATIENT
Start: 2022-08-11 | End: 2023-03-16 | Stop reason: SDUPTHER

## 2022-08-11 NOTE — PROGRESS NOTES
ADVANCED HEART FAILURE AND TRANSPLANTATION LVAD CLINIC VISIT      CHIEF COMPLAINT:  Follow-up of chronic heart failure post-LVAD    HISTORY OF PRESENT ILLNESS:  Rocco France is a 66 y.o.  male with a past medical history remarkable for non-ischemic cardiomyopathy status post DT HM3 implantation 1/13/2021 with sternal closure 1/14/2021 with unremarkable post-operative course.    He was admitted for syncope 1/27/2022 at which time he was found to have a evolving right cerebral acute subdural hematoma and acute basal cistern subarachnoid hemorrhage. He requiring intubation with prolonged hospitalization and was incidentally found to be positive for COVID-19 and treated with remdesivir. He was taken off of aspirin and discharged home 2/17/2022. He was also admitted 3/2022 for 3 days due to dysequilibrium, diplopia and repeat CT head was stable. Neurology recommended myasthenia gravis workup which was negative.     Co-morbidities: PAF, HTN, HLD, DM2, COVID-19 infection 1/2022    Last seen in clinic 7/13/2022 at which time due falls and orthostatic BP with lightheadedness, lisinopril was reduced to 5 mg once a day and amlodipine reduced to 5 mg once a day. We also discontinued BID Lasix and changed to 40 mg to take only as needed if weight goes up by >3 lbs in one day or >5 lbs in one week.   Given weight loss with recent night sweats and low appetite, we also recommended CT chest/abdomen/pelvis. This is scheduled for later today and he is also seeing palliative care today.    Since their last clinic visit, Doppler 70, 5000 RPM Flows 4-5 L/min, DLES 1, no issues with driveline. Standing Doppler 58   BNP is lower for him at 107 and has had a couple of falls at home feeling lightheaded and has some arthritic changes in knees. No syncope but multiple presyncopal episodes leading to falls. Denies SOB with current activity, orthopnea, PND, bendopnea, abdominal distension, early satiety, nausea, lower  extremity edema, chest discomfort, presyncope, palpitations, or syncope. Denies adverse bleeding, no hematochezia/melena/hematuria/hematemesis. Notes some night sweats with low appetite that is new but not frequent with progressive weight loss.     INR goal: Goal INR 2.0-3.0  Antiplatelets: OFF ASPIRIN AFTER SAH/SDH 1/2022  LDH: stable overall  Speed set at:  5000  Pulsatility: non-pulsatile  Antihypertensives:  Amlodipine 10 mg daily  Diuretic: Lasix 80 mg BID  GDMT Aldactone 25 mg daily, lisinopril 20 mg AM/10 mg PM  VAD interrogation: no events  TXP JACEY INTERROGATIONS 7/13/2022 5/11/2022 3/26/2022   Type HeartMate3 HeartMate3 -   Flow 4.9 4.0 -   Speed 5000 5100 -   PI 2.7 6.2 -   Power (Edwards) 3.7 3.6 -   LSL 4700 4700 -   Pulsatility No Pulse Pulse Pulse     I interrogated the patient's HeartMate 3 LVAD.     Current device parameters reviewed. There was no evidence of power spikes or suction events. The driveline was structurally intact without evidence of infection.     Cardiac Data:  Transthoracic Echo: Results for orders placed during the hospital encounter of 05/11/22  · There is an LVAD present. Base speed is 5100 RPMs. The pump type is a Heartmate III. The interventricular septum appears to bow into the right ventricle. The aortic valve opens intermittently with trivial AR.  · Mild left atrial enlargement.  · The left ventricle is severely enlarged LVEDD 69 mm with severely decreased systolic function.  · There is severe left ventricular global hypokinesis. The estimated ejection fraction is 15%.  · Left ventricular diastolic dysfunction.  · Mild right ventricular enlargement with moderately reduced right ventricular systolic function. TAPSE 0.94  · Mild-to-moderate mitral regurgitation.  · Moderate tricuspid regurgitation.  · Elevated central venous pressure (15 mmHg).  · The estimated PA systolic pressure is 32 mmHg.    ECG 3/22/2022: AF with RVR and aberrantly conducted beats 124 bpm, IVCD   ms    Left Heart Catheterization: none recent    Right Heart Catheterization: Results for orders placed during the hospital encounter of 03/22/22  RA: 16/ 16/ 11 RV: 30/ 10 PA: 35/ 16/ 22 PWP: 15/ 19/ 14 .   Cardiac output was 6.5  by Monse. Cardiac index is 3.1 L/min/m2.   O2 Sat: PA 67%.   Pulmonary vascular resistance: 98. Systemic vascular resistance: 749.   BP 90/63 (72);99%  Wedge sat 95%  HM 3 @5100    Devices: ICD 2010    PAST MEDICAL HISTORY:  Past Medical History:   Diagnosis Date    Acute respiratory failure requiring reintubation 1/28/2022    CLARISSE (acute kidney injury) 1/11/2021    Diabetes mellitus     Kidney stones        PAST SURGICAL HISTORY:  Past Surgical History:   Procedure Laterality Date    CARDIAC CATHETERIZATION  2010    no stents    CARDIAC DEFIBRILLATOR PLACEMENT  2010    CARDIAC DEFIBRILLATOR PLACEMENT      HERNIA REPAIR      IRRIGATION OF MEDIASTINUM N/A 1/14/2021    Procedure: IRRIGATION, MEDIASTINUM;  Surgeon: Zenon Corona MD;  [facility];  Service: Cardiovascular;  Laterality: N/A;    KNEE ARTHROSCOPY Right     LEFT VENTRICULAR ASSIST DEVICE Left 1/13/2021    Procedure: INSERTION- HeartMate 3 LEFT VENTRICULAR ASSIST DEVICE ;  Surgeon: Zenon Corona MD;  Location: [facility] OR [facility] FLR;  Service: Cardiovascular;  Laterality: Left;    RECONSTRUCTION OF PERICARDIUM N/A 1/14/2021    Procedure: RECONSTRUCTION, PERICARDIUM;  Surgeon: Zenon Corona MD;  Location: [facility] OR [facility] FLR;  Service: Cardiovascular;  Laterality: N/A;    RIGHT HEART CATHETERIZATION Right 11/2/2020    Procedure: INSERTION, CATHETER, RIGHT HEART;  Surgeon: Deana Lake MD;  Location: [facility] CATH LAB;  Service: Cardiology;  Laterality: Right;    RIGHT HEART CATHETERIZATION Right 3/24/2022    Procedure: INSERTION, CATHETER, RIGHT HEART;  Surgeon: Manuel Ramos Jr., MD;  Location: [facility] CATH LAB;  Service: Cardiology;  Laterality: Right;    STERNAL WOUND CLOSURE  1/13/2021    Procedure:  TEMPORARY CLOSURE OF CHEST;  Surgeon: Zenon Corona MD;  Location: [facility] OR [address] [place] [address];  Service: Cardiovascular;;    STERNAL WOUND CLOSURE N/A 1/14/2021    Procedure: CLOSURE, WOUND, STERNUM;  Surgeon: Zenon Corona MD;  [facility];  Service: Cardiovascular;  Laterality: N/A;       SOCIAL HISTORY  Social History     Socioeconomic History    Marital status:    Tobacco Use    Smoking status: Current Some Day Smoker     Types: Cigarettes    Smokeless tobacco: Never Used   Substance and Sexual Activity    Alcohol use: No       FAMILY HISTORY  Family History   Problem Relation Age of Onset    Heart attack Mother     Heart failure Brother     Heart attack Brother     Hypertension Brother        ALLERGIES:  Review of patient's allergies indicates:   Allergen Reactions    Pcn [penicillins] Hives       MEDICATIONS  Current Outpatient Medications on File Prior to Visit   Medication Sig Dispense Refill    acetaminophen (TYLENOL) 500 mg Cap Take 2 capsules (1,000 mg total) by mouth every 8 (eight) hours as needed (Pain).  0    amLODIPine (NORVASC) 10 MG tablet Take 0.5 tablets (5 mg total) by mouth once daily. 90 tablet 3    atorvastatin (LIPITOR) 80 MG tablet Take 1 tablet by mouth once daily 90 tablet 0    digoxin (LANOXIN) 125 mcg tablet Take 1 tablet (0.125 mg total) by mouth once daily. 90 tablet 0    docusate sodium (COLACE) 100 MG capsule Take 100 mg by mouth Daily.      furosemide (LASIX) 40 MG tablet Take 1 tablet (40 mg total) by mouth once daily at 6am. 30 tablet 11    lisinopriL (PRINIVIL,ZESTRIL) 5 MG tablet Take 1 tablet (5 mg total) by mouth 2 (two) times daily. 90 tablet 3    magnesium oxide (MAG-OX) 400 mg (241.3 mg magnesium) tablet Take 1 tablet (400 mg total) by mouth 3 (three) times daily. 90 tablet 11    mirtazapine (REMERON) 15 MG tablet Take 1 tablet (15 mg total) by mouth every evening. 30 tablet 11    polyethylene glycol (GLYCOLAX) 17 gram PwPk Mix 1 packet (17  g) with liquid and take by mouth daily as needed. 10 packet 0    SITagliptin (JANUVIA) 100 MG Tab Take 1 tablet (100 mg total) by mouth once daily. 90 tablet 3    spironolactone (ALDACTONE) 25 MG tablet Take 1 tablet (25 mg total) by mouth once daily. 30 tablet 11    warfarin (COUMADIN) 5 MG tablet Take 1 tablet (5 mg total) by mouth Daily. 90 tablet 3     No current facility-administered medications on file prior to visit.       REVIEW OF SYSTEMS  Review of Systems   Constitutional: Positive for activity change, appetite change, fatigue and unexpected weight change. Negative for fever.        Recent night sweats   Respiratory: Negative for chest tightness and shortness of breath.    Cardiovascular: Negative for chest pain, palpitations and leg swelling.   Gastrointestinal: Negative for abdominal distention, blood in stool, nausea and vomiting.   Genitourinary: Negative for difficulty urinating and hematuria.   Neurological: Positive for light-headedness and headaches. Negative for syncope.   Hematological: Bruises/bleeds easily.       PHYSICAL EXAM:    There were no vitals filed for this visit. There is no height or weight on file to calculate BMI.    GENERAL: Alert, NAD   HEENT: Anicteric sclerae  NECK: JVP not visible above level of clavicle while sitting upright or reclining 45 degrees without hepatojugular reflux  CARDIOVASCULAR: VAD hum present  PULMONARY: Lungs clear to auscultation bilaterally  ABDOMEN: Soft, nontender, nondistended. Driveline site c/d/i. No guarding, no rebound, no hepatomegaly  EXTREMITIES: Warm. No clubbing, cyanosis or edema. No pre-sacral edema  NEUROLOGIC: No focal deficits    LABS:    BMP  Lab Results   Component Value Date     (L) 07/13/2022    K 5.0 07/13/2022    CL 96 07/13/2022    CO2 29 07/13/2022    BUN 19 07/13/2022    CREATININE 1.5 (H) 07/13/2022    CALCIUM 9.8 07/13/2022    ANIONGAP 8 07/13/2022    ESTGFRAFRICA 55.3 (A) 07/13/2022    EGFRNONAA 47.8 (A) 07/13/2022        Magnesium   Date Value Ref Range Status   07/13/2022 2.5 1.6 - 2.6 mg/dL Final       Lab Results   Component Value Date    WBC 11.86 07/13/2022    HGB 10.6 (L) 07/13/2022    HCT 33.5 (L) 07/13/2022    MCV 87 07/13/2022     07/13/2022       Lab Results   Component Value Date    INR 3.3 08/08/2022    INR 2.5 08/03/2022    INR 3.0 08/01/2022       BNP   Date Value Ref Range Status   07/13/2022 107 (H) 0 - 99 pg/mL Final     Comment:     Values of less than 100 pg/ml are consistent with non-CHF populations.   05/11/2022 910 (H) 0 - 99 pg/mL Final     Comment:     Values of less than 100 pg/ml are consistent with non-CHF populations.   03/25/2022 915 (H) 0 - 99 pg/mL Final     Comment:     Values of less than 100 pg/ml are consistent with non-CHF populations.       LD   Date Value Ref Range Status   07/13/2022 178 110 - 260 U/L Final     Comment:     Results are increased in hemolyzed samples.   05/11/2022 237 110 - 260 U/L Final     Comment:     Results are increased in hemolyzed samples.   03/26/2022 309 (H) 110 - 260 U/L Final     Comment:     Results are increased in hemolyzed samples.       IMPRESSION:    NYHA Class II  ACC/AHA Stage D  Garces profile A    1. Heart replaced by heart assist device        PLAN:    Due falls and orthostatic BP with lightheadedness, reduce lisinopril to 5 mg once a day and reduce amlodipine to 5 mg once a day. We will also stop  Lasix and change to 40 mg to take only as needed if weight goes up by >3 lbs in one day or >5 lbs in one week. Repeat labs locally next week given K 5.0 today with above changes.    Given weight loss with recent night sweats and low appetite, will get CT chest/abdomen/pelvis.     Recommend 2 gram sodium restriction and 1500 mL fluid restriction.  Encourage physical activity with graded exercise program.  Requested patient to weigh themselves daily, and to notify us if their weight increases by more than 3 lbs in 1 day or 5 lbs in 1 week.   Pt to  call us withmore shortness of breath, swelling or unexpected weight changes, fever, chills, alarms, bloody or black bowel movements, or drainage from the driveline    Additionally, the patient has a patient centered goal of being able to improve their quality of life     F/U 1 month with clinic visit, labs and interrogation    Short-term goals: improve quality of life, appetite, prevent falls  Long-term goals: prolong meaningful life, currently DT VAD  Barriers: hypotension, weight loss    UNOS Patient Status  Functional Status: 80% - Normal activity with effort: some symptoms of disease  Physical Capacity: Limited Mobility  Working for Income: No  If no, reason not working: Unknown      Electronically signed by:   Dana Mendoza   08/11/2022 5:28 PM

## 2022-08-11 NOTE — PROGRESS NOTES
Subjective:   Patient ID:  Rocco France is a 66 y.o. male who presents for LVAD followup visit.    Implant Date: 1/13/2021  Initials: [facility]     Heartmate 3 RPM 5000     INR goal: 2-3   Bridge with Heparin   Antiplatelets: ASA 81     TXP JACEY INTERROGATIONS 8/11/2022   Type HeartMate3   Flow 3.8   Speed 5200   PI 7.8   Power (Edwards) 3.7   LSL 4800   Pulsatility Pulse   Interrogation of Ventricular assist device was performed with physician analysis of device parameters and review of device function. I have personally reviewed the interrogation findings and agree with findings as stated.     HPI  67 yo BM with stage D CHF due to non-ischemic cardiomyopathy status post DT HM3 implantation 1/13/2021 with sternal closure 1/14/2021 with unremarkable post-operative course.  He also has PAF, HTN, HLD, DM2.     He was admitted for syncope 1/27/2022 at which time he was found to have a evolving right cerebral acute subdural hematoma and acute basal cistern subarachnoid hemorrhage. He requiring intubation with prolonged hospitalization and was incidentally found to be positive for COVID-19 and treated with remdesivir. He was taken off of aspirin and discharged home 2/17/2022. He was also admitted 3/2022 for 3 days due to dysequilibrium, diplopia and repeat CT head was stable. Neurology recommended myasthenia gravis workup which was negative.      Last seen in clinic 7/13/2022 at which time due falls and orthostatic BP with lightheadedness, lisinopril was reduced to 5 mg qd, amlodipine reduced to 5 mg qd and Lasix and changed to 40 mg to take only as needed if weight goes up by >3 lbs in one day or >5 lbs in one week.  Today learned that he stopped amlodipine and lisinopril completely.  He went to ED this week for HA and had negative CT head.  Today BP elevated so sent script to [facility] pharmacy for amlodipine to take 5 mg now and can resume lisinopril at 5 mg qd but OK to wait until home.  He has appt with Palliative Care and  "offered to recheck BP after appt but turns out palliative care consult canceled.    From the HF perspective doing well with weight stable 175# home scale without lasix.  No dizzy or LH spells.    [place] he had stopped Januvia but he will check with primary as on for DM.    Review of Systems   Constitutional: Negative for chills, fever, malaise/fatigue, weight gain and weight loss.   Cardiovascular: Positive for dyspnea on exertion (mild). Negative for chest pain, claudication, irregular heartbeat, leg swelling, near-syncope, orthopnea (sleeps in chair not for breathing andnot new), palpitations, paroxysmal nocturnal dyspnea and syncope.   Respiratory: Negative for cough, shortness of breath, sputum production and wheezing.    Endocrine:        Off januvia since last hosp stay not sure of sugar at home   Hematologic/Lymphatic: Does not bruise/bleed easily.   Musculoskeletal: Negative for falls.   Gastrointestinal: Negative for change in bowel habit, hematemesis, hematochezia and melena.   Genitourinary: Negative for hematuria.   Neurological: Positive for headaches (CT scan negative outside hosp but I could not open disc so disc sent to Radiology this afternoon by coordinator). Negative for brief paralysis, dizziness, focal weakness, light-headedness, loss of balance and weakness.     Objective:   Blood pressure (!) 102/0, temperature 98.1 °F (36.7 °C), temperature source Oral, height 6' 2" (1.88 m), weight 77.6 kg (171 lb).body mass index is 21.96 kg/m².  Doppler: 102 mm Hg  Physical Exam  Constitutional:       General: He is not in acute distress.     Appearance: He is well-developed. He is not ill-appearing, toxic-appearing or diaphoretic.      Comments: BP (!) 102/0 (BP Location: Left arm, Patient Position: Sitting) Comment (BP Method): doppler  Temp 98.1 °F (36.7 °C) (Oral)   Ht 6' 2" (1.88 m)   Wt 77.6 kg (171 lb)   BMI 21.96 kg/m²   Friendly BM in NAD   HENT:      Head: Normocephalic and atraumatic. "   Eyes:      General: No scleral icterus.        Right eye: No discharge.         Left eye: No discharge.      Conjunctiva/sclera: Conjunctivae normal.   Neck:      Thyroid: No thyromegaly.      Vascular: No JVD.      Trachea: No tracheal deviation.      Comments: JVP below 8 cm  Cardiovascular:      Comments: Normal LVAD sounds; DL 1  Pulmonary:      Effort: Pulmonary effort is normal. No respiratory distress.      Breath sounds: Normal breath sounds. No wheezing or rales.   Abdominal:      General: Bowel sounds are normal. There is no distension.      Palpations: Abdomen is soft. There is no mass.      Tenderness: There is no abdominal tenderness. There is no guarding or rebound.      Comments: Liver span 10 cm   Musculoskeletal:         General: No tenderness.      Right lower leg: Edema (trace) present.      Left lower leg: Edema (trace) present.   Skin:     General: Skin is warm and dry.   Neurological:      General: No focal deficit present.      Mental Status: He is alert. Mental status is at baseline.   Psychiatric:         Mood and Affect: Mood normal.         Behavior: Behavior normal.         Thought Content: Thought content normal.         Judgment: Judgment normal.         Lab Results   Component Value Date     (H) 08/11/2022     08/11/2022    K 4.3 08/11/2022    MG 2.1 08/11/2022     08/11/2022    CO2 28 08/11/2022    PHOS 2.7 08/11/2022    BUN 11 08/11/2022    CREATININE 0.8 08/11/2022     (H) 08/11/2022    HGBA1C 7.7 (H) 01/28/2022    AST 14 08/11/2022    ALT 7 (L) 08/11/2022    ALBUMIN 3.1 (L) 08/11/2022    PROT 10.1 (H) 08/11/2022    BILITOT 1.2 (H) 08/11/2022    WBC 11.57 08/11/2022    HGB 9.8 (L) 08/11/2022    HCT 31.5 (L) 08/11/2022    HCT 30 (L) 03/22/2022     08/11/2022    INR 2.0 (H) 08/11/2022    INR 3.3 08/08/2022     08/11/2022    TSH 1.420 11/16/2020    CHOL 129 07/13/2022    HDL 47 07/13/2022    LDLCALC 65.2 07/13/2022    TRIG 84 07/13/2022        Assessment:      1. Heart replaced by heart assist device    2. Hypertensive cardiovascular-renal disease, stage 1-4 or unspecified chronic kidney disease, with heart failure    3. Chronic combined systolic and diastolic heart failure    4. NICM (nonischemic cardiomyopathy)    5. Essential hypertension    6. Type 2 diabetes mellitus without complication, without long-term current use of insulin    7. LVAD (left ventricular assist device) present    8. ICD (implantable cardioverter-defibrillator) in place        Plan:   Resume amlodipine 5 mg once a day (get from [facility] pharmacy to take now) and lisinopril 5 mg once a day    Check with Dr. Guardado regarding Ishmaeluvia    Patient instruction sheet also with:  Mean BP calculation:  Take top number and subtract bottom number and then divide this by 3  Mean BP = this number +bottom number    For example if you have home reading of 110/70 then  110-70=40 which divided by 3 is 13 (round off)  13+70=83 which is your MAP    MAP target is 65 to 90 for you    My cell [phone] and call me with update of BP readings tomorrow and over weekend    RTC 4 weeks     Patient is now NYHA II     Supplies ordered are medically necessary for care of LVAD and DL    Post LVAD goals of care are to get rid of headaches, control HF and avoid admission.    Recommend 2 gram sodium restriction and 1500cc fluid restriction.  Encourage physical activity with graded exercise program.  Requested patient to weigh themselves daily, and to notify us if their weight increases by more than 3 lbs in 1 day or 5 lbs in 1 week.     Listed for transplant: No    UNOS Patient Status  Functional Status: 90% - Able to carry on normal activity: minor symptoms of disease  Physical Capacity: No Limitations  Working for Income: No  If no, reason not working: Disability

## 2022-08-11 NOTE — PATIENT INSTRUCTIONS
Resume amlodipine 5 mg once a day and lisinopril 5 mg once a day    Check with Dr. Guardado regarding Januvia    Mean BP calculation:  Take top number and subtract bottom number and then divide this by 3  Mean BP = this number +bottom number    For example if you have home reading of 110/70 then  110-70=40 which divided by 3 is 13 (round off)  13+70=83 which is your MAP    MAP target is 65 to 90 for you    My cell [phone] and call me with update of BP readings tomorrow and over weekend

## 2022-08-11 NOTE — LETTER
August 11, 2022        Teresa Mendoza  [address]  [address]  [place] [address]  Phone: [phone]  Fax: [phone]             [facility]  [address]  [place] [address]  Phone: [phone]   Patient: Rocco France   MR Number: [id number]   YOB: 1955   Date of Visit: 8/11/2022       Dear Dr. Teresa Mendoza    Thank you for referring Rocco France to me for evaluation. Attached you will find relevant portions of my assessment and plan of care.    If you have questions, please do not hesitate to call me. I look forward to following Rocco France along with you.    Sincerely,    Manuel Ramos Jr, MD    Enclosure    If you would like to receive this communication electronically, please contact [email or web address] or [phone] to request [employer] Link access.    [employer] Link is a tool which provides read-only access to select patient information with whom you have a relationship. Its easy to use and provides real time access to review your patients record including encounter summaries, notes, results, and demographic information.    If you feel you have received this communication in error or would no longer like to receive these types of communications, please e-mail [email or web address]

## 2022-08-11 NOTE — PROCEDURES
TXP [facility] INTERROGATIONS 8/11/2022 7/13/2022 5/11/2022 3/26/2022 3/26/2022 3/26/2022 3/25/2022   Type HeartMate3 HeartMate3 HeartMate3 - - - -   Flow 3.8 4.9 4.0 - - - -   Speed 5200 5000 5100 - - - -   PI 7.8 2.7 6.2 - - - -   Power (Edwards) 3.7 3.7 3.6 - - - -   LSL 4800 4700 4700 - - - -   Pulsatility Pulse No Pulse Pulse Pulse Pulse Pulse Pulse   }Interrogation of Ventricular assist device was performed with physician analysis of device parameters and review of device function. I have personally reviewed the interrogation findings and agree with findings as stated.

## 2022-08-11 NOTE — PROGRESS NOTES
Date of Implant with Heartmate 3 LVAD: 1/13/21    PATIENT ARRIVED IN CLINIC:  Ambulatory  Accompanied by: Wife  Complaints/reason for visit today: routine    Vitals  Temperature, oral:   Temp Readings from Last 1 Encounters:   08/11/22 98.1 °F (36.7 °C) (Oral)     Blood Pressure:   BP Readings from Last 3 Encounters:   08/11/22 (!) 102/0   07/13/22 (!) 58/0   05/11/22 (!) 85/0        VAD Interrogation:  TXP JACEY INTERROGATIONS 8/11/2022 7/13/2022 5/11/2022   Type HeartMate3 HeartMate3 HeartMate3   Flow 3.8 4.9 4.0   Speed 5200 5000 5100   PI 7.8 2.7 6.2   Power (Edwards) 3.7 3.7 3.6   LSL 4800 4700 4700   Pulsatility Pulse No Pulse Pulse     HCT:   Lab Results   Component Value Date    HCT 33.5 (L) 07/13/2022    HCT 30 (L) 03/22/2022       History Log: [id number]  Problems / Issues / Alarms with VAD if any: None noted  VAD Sounds: HM3 Smooth    VAD Binder With Patient: no   Reviewed VAD Numbers In Binder: no  Enrolled in Care Companion: no    Equipment:  Emergency Equipment With Patient: yes   Any Equipment Issues: None noted   It is medically necessary to ensure patient has properly functioning equipment and wearables to prevent infection, injury or death to patient.     DLES Assessment:  Appearance Of Driveline: 1  Antibiotics: NO  Velour: no  Manual & Visual Inspection Of Driveline: No kinks or tears noted  Stabilization Device In Use: yes, [place] securement device    Heartmate 3 Module Cable:  No yellow exposed    Patient MyChart Questionnaire: No flowsheet data found.     Assessment:   PAIN: YES Location of Pain: Headaches, Description of Pain:  Comes and goes, currently 8/10  Complaints Of Nausea / Vomiting: None noted    Appearance and Frequency Of Stools: normal and formed without blood & every other day  Color Of Urine: clear/yellow  Coping/Depression/Anxiety: coping okay  Sleep Habits: 6 hrs /night  Sleep Aids: Remeron  Showering: Yes, reminded to change dressing immediately after drying  off  Activity/Exercise: walking sometimes   Driving: Yes. Reminded to pull over should there be an alarm before looking down at controller.    DLES Dressing Care:   Frequency of Dressing Changes: daily & daily kit  Pt In Need Of Management Kits?:no   It is medically necessary to have VAD management kits in order to prevent infection or to assist in the healing of an infected DLES.    Labs:    Chemistry        Component Value Date/Time     08/11/2022 0808    K 4.3 08/11/2022 0808     08/11/2022 0808    CO2 28 08/11/2022 0808    BUN 11 08/11/2022 0808    CREATININE 0.8 08/11/2022 0808     (H) 08/11/2022 0808        Component Value Date/Time    CALCIUM 9.8 08/11/2022 0808    ALKPHOS 147 (H) 08/11/2022 0808    AST 14 08/11/2022 0808    ALT 7 (L) 08/11/2022 0808    BILITOT 1.2 (H) 08/11/2022 0808    ESTGFRAFRICA 55.3 (A) 07/13/2022 0834    EGFRNONAA 47.8 (A) 07/13/2022 0834            Magnesium   Date Value Ref Range Status   08/11/2022 2.1 1.6 - 2.6 mg/dL Final       Lab Results   Component Value Date    WBC 11.57 08/11/2022    HGB 9.8 (L) 08/11/2022    HCT 31.5 (L) 08/11/2022    MCV 89 08/11/2022     08/11/2022       Lab Results   Component Value Date    INR 2.0 (H) 08/11/2022    INR 3.3 08/08/2022    INR 2.5 08/03/2022       BNP   Date Value Ref Range Status   08/11/2022 248 (H) 0 - 99 pg/mL Final     Comment:     Values of less than 100 pg/ml are consistent with non-CHF populations.   07/13/2022 107 (H) 0 - 99 pg/mL Final     Comment:     Values of less than 100 pg/ml are consistent with non-CHF populations.   05/11/2022 910 (H) 0 - 99 pg/mL Final     Comment:     Values of less than 100 pg/ml are consistent with non-CHF populations.       LD   Date Value Ref Range Status   08/11/2022 190 110 - 260 U/L Final     Comment:     Results are increased in hemolyzed samples.   07/13/2022 178 110 - 260 U/L Final     Comment:     Results are increased in hemolyzed samples.   05/11/2022 237 110 - 260  U/L Final     Comment:     Results are increased in hemolyzed samples.       Labs reviewed with patient: YES after clinic as they did not result during clinic    Medication reconciliation: per MA.  Medication Detail updated today: yes  Coumadin Managed by: [facility]    Education: Reviewed driveline care, emergency procedures, how to change the controller, alarms with patient, as well as discussed how to page the VAD coordinator in case of an emergency.   Covid - 19 education: Reminded patient/caregiver to check temperature daily and call us if it is > 99.0.  Reminded them  to stay 6 feet away from other people, wash hands frequently, don't touch your face and stay home except to get labs, medications, and appts.    Covid Vaccine: Pt informed that we are encouraging all VAD patients to receive the COVID vaccine.  Informed pt that they can take tylenol but should avoid other NSAIDs.      Plans/Needs: Patient seen in clinic for routine follow up with Dr. Ramos . Patient reports not taking any blood pressure medicines, last note from HTS team stated to decrease BP meds not stop, clarified with MD as pressure is elevated today, patient to  amlodipine 5mg here at pharmacy and take daily along with 5 mg lisinopril, see MD note. Patient to see palliative medi Patient has also lost 20lbs since last being seen a month ago, however looking through weight history, there is extreme fluctuation. Patient reports consistently being 175lbs at home. Spoke about nutrition with TEX Merlos. Patient also stopped Januvia d/t issues refilling with pharmacy, encouraged to follow up with endocrine MD about this. Patient to RTC in 1 months with an echo.      Hurricane Season: No

## 2022-08-12 ENCOUNTER — EDUCATION (OUTPATIENT)
Dept: TRANSPLANT | Facility: CLINIC | Age: 67
End: 2022-08-12
Payer: MEDICARE

## 2022-08-12 ENCOUNTER — TELEPHONE (OUTPATIENT)
Dept: TRANSPLANT | Facility: CLINIC | Age: 67
End: 2022-08-12
Payer: MEDICARE

## 2022-08-12 NOTE — TELEPHONE ENCOUNTER
Faxed out patient lab order to     // [place] [address] [phone]  // [facility] ph [phone] fax [phone]) results Lab - [phone]      To check bmp bnp due to resuming amlodipine and lisinopril

## 2022-08-16 ENCOUNTER — ANTI-COAG VISIT (OUTPATIENT)
Dept: CARDIOLOGY | Facility: CLINIC | Age: 67
End: 2022-08-16
Payer: MEDICARE

## 2022-08-16 DIAGNOSIS — Z95.811 LVAD (LEFT VENTRICULAR ASSIST DEVICE) PRESENT: Primary | ICD-10-CM

## 2022-08-16 LAB — INR PPP: 1.4

## 2022-08-16 PROCEDURE — 93793 PR ANTICOAGULANT MGMT FOR PT TAKING WARFARIN: ICD-10-PCS | Mod: S$GLB,,,

## 2022-08-16 PROCEDURE — 93793 ANTICOAG MGMT PT WARFARIN: CPT | Mod: S$GLB,,,

## 2022-08-16 NOTE — PROGRESS NOTES
Patient stated he can't remember Warfarin dose, but thinks he has been taking the correct dose, was in ER 8/5/22 for headaches(per pt LVAD Team aware), has been taking 2 Tylenol 500mg twice a day for headaches, no other changes noted.

## 2022-08-18 ENCOUNTER — ANTI-COAG VISIT (OUTPATIENT)
Dept: CARDIOLOGY | Facility: CLINIC | Age: 67
End: 2022-08-18
Payer: MEDICARE

## 2022-08-18 DIAGNOSIS — Z95.811 LVAD (LEFT VENTRICULAR ASSIST DEVICE) PRESENT: Primary | ICD-10-CM

## 2022-08-18 LAB — INR PPP: 1.4

## 2022-08-18 PROCEDURE — 93793 PR ANTICOAGULANT MGMT FOR PT TAKING WARFARIN: ICD-10-PCS | Mod: S$GLB,,,

## 2022-08-18 PROCEDURE — 93793 ANTICOAG MGMT PT WARFARIN: CPT | Mod: S$GLB,,,

## 2022-08-18 NOTE — PROGRESS NOTES
Patient reports correct Warfarin dose, had bloody mucus when blowing nose one day last week(pt unsure of date), still having headaches daily and taking Tylenol 500mg for the headaches(stated LVAD Team aware), no other changes noted.

## 2022-08-19 ENCOUNTER — TELEPHONE (OUTPATIENT)
Dept: TRANSPLANT | Facility: CLINIC | Age: 67
End: 2022-08-19
Payer: MEDICARE

## 2022-08-19 NOTE — TELEPHONE ENCOUNTER
Contacted HH to obtain lab results    Provided fax number    They will fax over results once they are received    Understanding verbalized.

## 2022-08-19 NOTE — TELEPHONE ENCOUNTER
----- Message from iCra Izquierdo LPN sent at 8/12/2022  7:05 AM CDT -----  Regarding: bmp bnp Resume amlodipine 5 mg once a day and lisinopril 5 mg once a day  // Home Health [address] [place] [address] [phone]  // [facility] [phone] fax [phone]) results Lab - [phone]

## 2022-08-22 ENCOUNTER — ANTI-COAG VISIT (OUTPATIENT)
Dept: CARDIOLOGY | Facility: CLINIC | Age: 67
End: 2022-08-22
Payer: MEDICARE

## 2022-08-22 ENCOUNTER — TELEPHONE (OUTPATIENT)
Dept: TRANSPLANT | Facility: CLINIC | Age: 67
End: 2022-08-22
Payer: MEDICARE

## 2022-08-22 DIAGNOSIS — Z95.811 LVAD (LEFT VENTRICULAR ASSIST DEVICE) PRESENT: Primary | ICD-10-CM

## 2022-08-22 LAB — INR PPP: 1.8

## 2022-08-22 PROCEDURE — 93793 ANTICOAG MGMT PT WARFARIN: CPT | Mod: S$GLB,,,

## 2022-08-22 PROCEDURE — 93793 PR ANTICOAGULANT MGMT FOR PT TAKING WARFARIN: ICD-10-PCS | Mod: S$GLB,,,

## 2022-08-22 NOTE — TELEPHONE ENCOUNTER
----- Message from Cira Izquierdo LPN sent at 8/12/2022  7:05 AM CDT -----  Regarding: bmp bnp Resume amlodipine 5 mg once a day and lisinopril 5 mg once a day  // Home Health [address] [place] [address] [phone]  // [facility] [phone] fax [phone]) results Lab - [phone]

## 2022-08-22 NOTE — TELEPHONE ENCOUNTER
F/u put into hh agency    Apologize for delay in sending labs    Fax number provided    Awaiting results.

## 2022-08-23 ENCOUNTER — TELEPHONE (OUTPATIENT)
Dept: TRANSPLANT | Facility: CLINIC | Age: 67
End: 2022-08-23
Payer: MEDICARE

## 2022-08-23 NOTE — TELEPHONE ENCOUNTER
Contacted [facility] spoke with Frances whom then transferred the line to [facility]    Kierra was unavailable and message was left via voice mail    Have been in attempt to obtain labs for patient, labs received this morning were from 8/8/22 which is the incorrect date.    Left message and direct call back number

## 2022-08-23 NOTE — TELEPHONE ENCOUNTER
----- Message from Cira Izquierdo LPN sent at 8/12/2022  7:05 AM CDT -----  Regarding: bmp bnp Resume amlodipine 5 mg once a day and lisinopril 5 mg once a day  // Home Health [address] [place] [address] [phone]  // [facility] [phone] fax [phone]) results Lab - [phone]

## 2022-08-23 NOTE — TELEPHONE ENCOUNTER
Received call from [facility]  Labs were not drawn it was a miscommunication    Labs to be drawn on Thursday 8/25/22 new orders to be faxed, fax number confirmed.

## 2022-08-23 NOTE — TELEPHONE ENCOUNTER
----- Message from Cira Izquierdo LPN sent at 8/12/2022  7:05 AM CDT -----  Regarding: bmp bnp Resume amlodipine 5 mg once a day and lisinopril 5 mg once a day  // Home Health [address] [place] [address] [phone]  // [facility] [phone] fax [phone]) results Lab - [phone]

## 2022-08-25 ENCOUNTER — TELEPHONE (OUTPATIENT)
Dept: TRANSPLANT | Facility: CLINIC | Age: 67
End: 2022-08-25
Payer: MEDICARE

## 2022-08-25 ENCOUNTER — ANTI-COAG VISIT (OUTPATIENT)
Dept: CARDIOLOGY | Facility: CLINIC | Age: 67
End: 2022-08-25
Payer: MEDICARE

## 2022-08-25 DIAGNOSIS — Z95.811 LVAD (LEFT VENTRICULAR ASSIST DEVICE) PRESENT: Primary | ICD-10-CM

## 2022-08-25 LAB
EXT BUN: 33
EXT CALCIUM: 9.3
EXT CHLORIDE: 104
EXT CREATININE: 1.2 MG/DL
EXT GLUCOSE: 165
EXT NT PROBNP: 1419
EXT POTASSIUM: 3.7
EXT SODIUM: 139 MMOL/L
INR PPP: 2.2

## 2022-08-25 PROCEDURE — 93793 ANTICOAG MGMT PT WARFARIN: CPT | Mod: S$GLB,,,

## 2022-08-25 PROCEDURE — 93793 PR ANTICOAGULANT MGMT FOR PT TAKING WARFARIN: ICD-10-PCS | Mod: S$GLB,,,

## 2022-08-25 NOTE — TELEPHONE ENCOUNTER
External labs entered for Review  Labs completed on 08/25/22  Results routed to VAD Coordinator

## 2022-08-29 ENCOUNTER — ANTI-COAG VISIT (OUTPATIENT)
Dept: CARDIOLOGY | Facility: CLINIC | Age: 67
End: 2022-08-29
Payer: MEDICARE

## 2022-08-29 DIAGNOSIS — Z95.811 LVAD (LEFT VENTRICULAR ASSIST DEVICE) PRESENT: Primary | ICD-10-CM

## 2022-08-29 LAB — INR PPP: 2.1

## 2022-08-29 PROCEDURE — 93793 PR ANTICOAGULANT MGMT FOR PT TAKING WARFARIN: ICD-10-PCS | Mod: S$GLB,,,

## 2022-08-29 PROCEDURE — 93793 ANTICOAG MGMT PT WARFARIN: CPT | Mod: S$GLB,,,

## 2022-09-01 ENCOUNTER — ANTI-COAG VISIT (OUTPATIENT)
Dept: CARDIOLOGY | Facility: CLINIC | Age: 67
End: 2022-09-01
Payer: MEDICARE

## 2022-09-01 DIAGNOSIS — Z95.811 LVAD (LEFT VENTRICULAR ASSIST DEVICE) PRESENT: Primary | ICD-10-CM

## 2022-09-01 LAB — INR PPP: 2.4

## 2022-09-01 PROCEDURE — 93793 PR ANTICOAGULANT MGMT FOR PT TAKING WARFARIN: ICD-10-PCS | Mod: S$GLB,,,

## 2022-09-01 PROCEDURE — 93793 ANTICOAG MGMT PT WARFARIN: CPT | Mod: S$GLB,,,

## 2022-09-08 ENCOUNTER — ANTI-COAG VISIT (OUTPATIENT)
Dept: CARDIOLOGY | Facility: CLINIC | Age: 67
End: 2022-09-08
Payer: MEDICARE

## 2022-09-08 DIAGNOSIS — Z95.811 LVAD (LEFT VENTRICULAR ASSIST DEVICE) PRESENT: Primary | ICD-10-CM

## 2022-09-08 LAB — INR PPP: 2.5

## 2022-09-08 PROCEDURE — 93793 PR ANTICOAGULANT MGMT FOR PT TAKING WARFARIN: ICD-10-PCS | Mod: S$GLB,,,

## 2022-09-08 PROCEDURE — 93793 ANTICOAG MGMT PT WARFARIN: CPT | Mod: S$GLB,,,

## 2022-09-13 ENCOUNTER — TELEPHONE (OUTPATIENT)
Dept: PALLIATIVE MEDICINE | Facility: CLINIC | Age: 67
End: 2022-09-13
Payer: MEDICARE

## 2022-09-14 ENCOUNTER — HOSPITAL ENCOUNTER (OUTPATIENT)
Dept: CARDIOLOGY | Facility: HOSPITAL | Age: 67
Discharge: HOME | DRG: 812 | End: 2022-09-14
Attending: INTERNAL MEDICINE
Payer: MEDICARE

## 2022-09-14 ENCOUNTER — OFFICE VISIT (OUTPATIENT)
Dept: TRANSPLANT | Facility: CLINIC | Age: 67
End: 2022-09-14
Payer: MEDICARE

## 2022-09-14 ENCOUNTER — HOSPITAL ENCOUNTER (INPATIENT)
Facility: HOSPITAL | Age: 67
LOS: 2 days | Discharge: HOME | DRG: 812 | End: 2022-09-16
Attending: INTERNAL MEDICINE | Admitting: INTERNAL MEDICINE
Payer: MEDICARE

## 2022-09-14 ENCOUNTER — CLINICAL SUPPORT (OUTPATIENT)
Dept: TRANSPLANT | Facility: CLINIC | Age: 67
End: 2022-09-14
Payer: MEDICARE

## 2022-09-14 ENCOUNTER — ANTI-COAG VISIT (OUTPATIENT)
Dept: CARDIOLOGY | Facility: CLINIC | Age: 67
End: 2022-09-14
Payer: MEDICARE

## 2022-09-14 VITALS
BODY MASS INDEX: 21.94 KG/M2 | WEIGHT: 171 LBS | HEIGHT: 74 IN | HEIGHT: 74 IN | WEIGHT: 171 LBS | TEMPERATURE: 98 F | SYSTOLIC BLOOD PRESSURE: 90 MMHG | BODY MASS INDEX: 21.94 KG/M2

## 2022-09-14 DIAGNOSIS — I48.0 PAROXYSMAL ATRIAL FIBRILLATION: ICD-10-CM

## 2022-09-14 DIAGNOSIS — I50.42 CHRONIC COMBINED SYSTOLIC AND DIASTOLIC HEART FAILURE: ICD-10-CM

## 2022-09-14 DIAGNOSIS — Z95.811 HEART REPLACED BY HEART ASSIST DEVICE: ICD-10-CM

## 2022-09-14 DIAGNOSIS — Z95.811 LVAD (LEFT VENTRICULAR ASSIST DEVICE) PRESENT: ICD-10-CM

## 2022-09-14 DIAGNOSIS — E11.9 TYPE 2 DIABETES MELLITUS WITHOUT COMPLICATION, WITHOUT LONG-TERM CURRENT USE OF INSULIN: ICD-10-CM

## 2022-09-14 DIAGNOSIS — Z95.811 LVAD (LEFT VENTRICULAR ASSIST DEVICE) PRESENT: Primary | ICD-10-CM

## 2022-09-14 DIAGNOSIS — I42.8 NICM (NONISCHEMIC CARDIOMYOPATHY): ICD-10-CM

## 2022-09-14 DIAGNOSIS — D64.9 ANEMIA, UNSPECIFIED TYPE: ICD-10-CM

## 2022-09-14 DIAGNOSIS — D62 ACUTE BLOOD LOSS ANEMIA: ICD-10-CM

## 2022-09-14 DIAGNOSIS — Z79.01 ANTICOAGULATED: ICD-10-CM

## 2022-09-14 DIAGNOSIS — I10 ESSENTIAL HYPERTENSION: ICD-10-CM

## 2022-09-14 DIAGNOSIS — Z95.810 ICD (IMPLANTABLE CARDIOVERTER-DEFIBRILLATOR) IN PLACE: ICD-10-CM

## 2022-09-14 PROBLEM — I13.0 HYPERTENSIVE CARDIOVASCULAR-RENAL DISEASE, STAGE 1-4 OR UNSPECIFIED CHRONIC KIDNEY DISEASE, WITH HEART FAILURE: Status: RESOLVED | Noted: 2022-08-11 | Resolved: 2022-09-14

## 2022-09-14 PROBLEM — N17.9 AKI (ACUTE KIDNEY INJURY): Status: RESOLVED | Noted: 2021-01-11 | Resolved: 2022-09-14

## 2022-09-14 LAB
ASCENDING AORTA: 3.54 CM
AV INDEX (PROSTH): 0.99
AV MEAN GRADIENT: 0 MMHG
AV PEAK GRADIENT: 1 MMHG
AV VALVE AREA: 5.13 CM2
AV VELOCITY RATIO: 0.78
BACTERIA #/AREA URNS AUTO: ABNORMAL /HPF
BILIRUB UR QL STRIP: NEGATIVE
BSA FOR ECHO PROCEDURE: 2.01 M2
CLARITY UR REFRACT.AUTO: CLEAR
COLOR UR AUTO: YELLOW
CV ECHO LV RWT: 0.21 CM
DOP CALC AO PEAK VEL: 0.41 M/S
DOP CALC AO VTI: 4.94 CM
DOP CALC LVOT AREA: 5.2 CM2
DOP CALC LVOT DIAMETER: 2.57 CM
DOP CALC LVOT PEAK VEL: 0.32 M/S
DOP CALC LVOT STROKE VOLUME: 25.35 CM3
DOP CALCLVOT PEAK VEL VTI: 4.89 CM
E WAVE DECELERATION TIME: 251.28 MSEC
E/A RATIO: 1.26
E/E' RATIO: 8 M/S
ECHO LV POSTERIOR WALL: 0.69 CM (ref 0.6–1.1)
EJECTION FRACTION: 20 %
ESTIMATED AVG GLUCOSE: 140 MG/DL (ref 68–131)
FERRITIN SERPL-MCNC: 393 NG/ML (ref 20–300)
FRACTIONAL SHORTENING: 13 % (ref 28–44)
GLUCOSE UR QL STRIP: NEGATIVE
HBA1C MFR BLD: 6.5 % (ref 4–5.6)
HGB UR QL STRIP: NEGATIVE
HYALINE CASTS UR QL AUTO: 15 /LPF
INR PPP: 4 (ref 0.8–1.2)
INTERVENTRICULAR SEPTUM: 0.73 CM (ref 0.6–1.1)
IRON SERPL-MCNC: 25 UG/DL (ref 45–160)
KETONES UR QL STRIP: NEGATIVE
LA MAJOR: 5.92 CM
LA MINOR: 5.76 CM
LA WIDTH: 5.41 CM
LEFT ATRIUM SIZE: 4.11 CM
LEFT ATRIUM VOLUME INDEX: 54.4 ML/M2
LEFT ATRIUM VOLUME: 110.35 CM3
LEFT INTERNAL DIMENSION IN SYSTOLE: 5.8 CM (ref 2.1–4)
LEFT VENTRICLE DIASTOLIC VOLUME INDEX: 111.99 ML/M2
LEFT VENTRICLE DIASTOLIC VOLUME: 227.34 ML
LEFT VENTRICLE MASS INDEX: 95 G/M2
LEFT VENTRICLE SYSTOLIC VOLUME INDEX: 82 ML/M2
LEFT VENTRICLE SYSTOLIC VOLUME: 166.48 ML
LEFT VENTRICULAR INTERNAL DIMENSION IN DIASTOLE: 6.65 CM (ref 3.5–6)
LEFT VENTRICULAR MASS: 193.19 G
LEUKOCYTE ESTERASE UR QL STRIP: NEGATIVE
LV LATERAL E/E' RATIO: 5.67 M/S
LV SEPTAL E/E' RATIO: 13.6 M/S
MICROSCOPIC COMMENT: ABNORMAL
MV PEAK A VEL: 0.54 M/S
MV PEAK E VEL: 0.68 M/S
MV STENOSIS PRESSURE HALF TIME: 72.87 MS
MV VALVE AREA P 1/2 METHOD: 3.02 CM2
NITRITE UR QL STRIP: NEGATIVE
PH UR STRIP: 6 [PH] (ref 5–8)
PISA TR MAX VEL: 2.52 M/S
POCT GLUCOSE: 129 MG/DL (ref 70–110)
POCT GLUCOSE: 174 MG/DL (ref 70–110)
POCT GLUCOSE: 208 MG/DL (ref 70–110)
PROT UR QL STRIP: ABNORMAL
PROTHROMBIN TIME: 38.8 SEC (ref 9–12.5)
PULM VEIN S/D RATIO: 0.55
PV PEAK D VEL: 0.53 M/S
PV PEAK S VEL: 0.29 M/S
RA MAJOR: 5.54 CM
RA PRESSURE: 3 MMHG
RA WIDTH: 5.69 CM
RBC #/AREA URNS AUTO: 3 /HPF (ref 0–4)
RIGHT VENTRICULAR END-DIASTOLIC DIMENSION: 5.48 CM
RV TISSUE DOPPLER FREE WALL SYSTOLIC VELOCITY 1 (APICAL 4 CHAMBER VIEW): 6.27 CM/S
SATURATED IRON: 9 % (ref 20–50)
SINUS: 3.94 CM
SP GR UR STRIP: 1.02 (ref 1–1.03)
SQUAMOUS #/AREA URNS AUTO: 0 /HPF
STJ: 3.19 CM
TDI LATERAL: 0.12 M/S
TDI SEPTAL: 0.05 M/S
TDI: 0.09 M/S
TOTAL IRON BINDING CAPACITY: 286 UG/DL (ref 250–450)
TR MAX PG: 25 MMHG
TRANSFERRIN SERPL-MCNC: 193 MG/DL (ref 200–375)
TRICUSPID ANNULAR PLANE SYSTOLIC EXCURSION: 1.1 CM
TV REST PULMONARY ARTERY PRESSURE: 28 MMHG
URN SPEC COLLECT METH UR: ABNORMAL
WBC #/AREA URNS AUTO: 5 /HPF (ref 0–5)

## 2022-09-14 PROCEDURE — 85610 PROTHROMBIN TIME: CPT | Mod: 91 | Performed by: PHYSICIAN ASSISTANT

## 2022-09-14 PROCEDURE — 3008F BODY MASS INDEX DOCD: CPT | Mod: CPTII,S$GLB,, | Performed by: INTERNAL MEDICINE

## 2022-09-14 PROCEDURE — 27000248 HC VAD-ADDITIONAL DAY

## 2022-09-14 PROCEDURE — 99214 OFFICE O/P EST MOD 30 MIN: CPT | Mod: S$GLB,,, | Performed by: INTERNAL MEDICINE

## 2022-09-14 PROCEDURE — 99999 PR PBB SHADOW E&M-EST. PATIENT-LVL III: CPT | Mod: PBBFAC,,, | Performed by: INTERNAL MEDICINE

## 2022-09-14 PROCEDURE — 99214 PR OFFICE/OUTPT VISIT, EST, LEVL IV, 30-39 MIN: ICD-10-PCS | Mod: S$GLB,,, | Performed by: INTERNAL MEDICINE

## 2022-09-14 PROCEDURE — 3008F PR BODY MASS INDEX (BMI) DOCUMENTED: ICD-10-PCS | Mod: CPTII,S$GLB,, | Performed by: INTERNAL MEDICINE

## 2022-09-14 PROCEDURE — 1123F ACP DISCUSS/DSCN MKR DOCD: CPT | Mod: CPTII,S$GLB,, | Performed by: INTERNAL MEDICINE

## 2022-09-14 PROCEDURE — 99999 PR PBB SHADOW E&M-EST. PATIENT-LVL III: ICD-10-PCS | Mod: PBBFAC,,, | Performed by: INTERNAL MEDICINE

## 2022-09-14 PROCEDURE — 25000003 PHARM REV CODE 250: Performed by: PHYSICIAN ASSISTANT

## 2022-09-14 PROCEDURE — 1101F PT FALLS ASSESS-DOCD LE1/YR: CPT | Mod: CPTII,S$GLB,, | Performed by: INTERNAL MEDICINE

## 2022-09-14 PROCEDURE — 20600001 HC STEP DOWN PRIVATE ROOM

## 2022-09-14 PROCEDURE — 3288F PR FALLS RISK ASSESSMENT DOCUMENTED: ICD-10-PCS | Mod: CPTII,S$GLB,, | Performed by: INTERNAL MEDICINE

## 2022-09-14 PROCEDURE — 4010F ACE/ARB THERAPY RXD/TAKEN: CPT | Mod: CPTII,S$GLB,, | Performed by: INTERNAL MEDICINE

## 2022-09-14 PROCEDURE — 93306 ECHO (CUPID ONLY): ICD-10-PCS | Mod: 26,,, | Performed by: INTERNAL MEDICINE

## 2022-09-14 PROCEDURE — 83036 HEMOGLOBIN GLYCOSYLATED A1C: CPT | Performed by: PHYSICIAN ASSISTANT

## 2022-09-14 PROCEDURE — 87040 BLOOD CULTURE FOR BACTERIA: CPT | Mod: 59 | Performed by: PHYSICIAN ASSISTANT

## 2022-09-14 PROCEDURE — 1123F PR ADV CARE PLAN DISCUSSED, PLAN OR SURROGATE DOCUMENTED: ICD-10-PCS | Mod: CPTII,S$GLB,, | Performed by: INTERNAL MEDICINE

## 2022-09-14 PROCEDURE — 1126F PR PAIN SEVERITY QUANTIFIED, NO PAIN PRESENT: ICD-10-PCS | Mod: CPTII,S$GLB,, | Performed by: INTERNAL MEDICINE

## 2022-09-14 PROCEDURE — 1126F AMNT PAIN NOTED NONE PRSNT: CPT | Mod: CPTII,S$GLB,, | Performed by: INTERNAL MEDICINE

## 2022-09-14 PROCEDURE — 93306 TTE W/DOPPLER COMPLETE: CPT | Mod: 26,,, | Performed by: INTERNAL MEDICINE

## 2022-09-14 PROCEDURE — 93306 TTE W/DOPPLER COMPLETE: CPT

## 2022-09-14 PROCEDURE — 3051F HG A1C>EQUAL 7.0%<8.0%: CPT | Mod: CPTII,S$GLB,, | Performed by: INTERNAL MEDICINE

## 2022-09-14 PROCEDURE — 84466 ASSAY OF TRANSFERRIN: CPT | Performed by: PHYSICIAN ASSISTANT

## 2022-09-14 PROCEDURE — 36415 COLL VENOUS BLD VENIPUNCTURE: CPT | Performed by: PHYSICIAN ASSISTANT

## 2022-09-14 PROCEDURE — 99222 1ST HOSP IP/OBS MODERATE 55: CPT | Mod: ,,, | Performed by: NURSE PRACTITIONER

## 2022-09-14 PROCEDURE — 4010F PR ACE/ARB THEARPY RXD/TAKEN: ICD-10-PCS | Mod: CPTII,S$GLB,, | Performed by: INTERNAL MEDICINE

## 2022-09-14 PROCEDURE — 1101F PR PT FALLS ASSESS DOC 0-1 FALLS W/OUT INJ PAST YR: ICD-10-PCS | Mod: CPTII,S$GLB,, | Performed by: INTERNAL MEDICINE

## 2022-09-14 PROCEDURE — 3288F FALL RISK ASSESSMENT DOCD: CPT | Mod: CPTII,S$GLB,, | Performed by: INTERNAL MEDICINE

## 2022-09-14 PROCEDURE — 3051F PR MOST RECENT HEMOGLOBIN A1C LEVEL 7.0 - < 8.0%: ICD-10-PCS | Mod: CPTII,S$GLB,, | Performed by: INTERNAL MEDICINE

## 2022-09-14 PROCEDURE — 99222 PR INITIAL HOSPITAL CARE,LEVL II: ICD-10-PCS | Mod: ,,, | Performed by: NURSE PRACTITIONER

## 2022-09-14 PROCEDURE — 94761 N-INVAS EAR/PLS OXIMETRY MLT: CPT

## 2022-09-14 PROCEDURE — 81001 URINALYSIS AUTO W/SCOPE: CPT | Performed by: PHYSICIAN ASSISTANT

## 2022-09-14 PROCEDURE — 82728 ASSAY OF FERRITIN: CPT | Performed by: PHYSICIAN ASSISTANT

## 2022-09-14 RX ORDER — SPIRONOLACTONE 25 MG/1
25 TABLET ORAL DAILY
Status: DISCONTINUED | OUTPATIENT
Start: 2022-09-15 | End: 2022-09-16 | Stop reason: HOSPADM

## 2022-09-14 RX ORDER — IBUPROFEN 200 MG
16 TABLET ORAL
Status: DISCONTINUED | OUTPATIENT
Start: 2022-09-14 | End: 2022-09-16 | Stop reason: HOSPADM

## 2022-09-14 RX ORDER — MIRTAZAPINE 15 MG/1
15 TABLET, FILM COATED ORAL NIGHTLY
Status: DISCONTINUED | OUTPATIENT
Start: 2022-09-14 | End: 2022-09-16 | Stop reason: HOSPADM

## 2022-09-14 RX ORDER — LANOLIN ALCOHOL/MO/W.PET/CERES
400 CREAM (GRAM) TOPICAL 3 TIMES DAILY
Status: DISCONTINUED | OUTPATIENT
Start: 2022-09-14 | End: 2022-09-16 | Stop reason: HOSPADM

## 2022-09-14 RX ORDER — GLUCAGON 1 MG
1 KIT INJECTION
Status: DISCONTINUED | OUTPATIENT
Start: 2022-09-14 | End: 2022-09-16 | Stop reason: HOSPADM

## 2022-09-14 RX ORDER — IBUPROFEN 200 MG
24 TABLET ORAL
Status: DISCONTINUED | OUTPATIENT
Start: 2022-09-14 | End: 2022-09-16 | Stop reason: HOSPADM

## 2022-09-14 RX ORDER — LISINOPRIL 5 MG/1
5 TABLET ORAL DAILY
Status: DISCONTINUED | OUTPATIENT
Start: 2022-09-15 | End: 2022-09-16 | Stop reason: HOSPADM

## 2022-09-14 RX ORDER — DIGOXIN 125 MCG
0.12 TABLET ORAL DAILY
Status: DISCONTINUED | OUTPATIENT
Start: 2022-09-15 | End: 2022-09-16 | Stop reason: HOSPADM

## 2022-09-14 RX ORDER — AMLODIPINE BESYLATE 5 MG/1
5 TABLET ORAL DAILY
Status: DISCONTINUED | OUTPATIENT
Start: 2022-09-15 | End: 2022-09-16 | Stop reason: HOSPADM

## 2022-09-14 RX ORDER — ATORVASTATIN CALCIUM 20 MG/1
80 TABLET, FILM COATED ORAL DAILY
Status: DISCONTINUED | OUTPATIENT
Start: 2022-09-15 | End: 2022-09-16 | Stop reason: HOSPADM

## 2022-09-14 RX ORDER — ACETAMINOPHEN 500 MG
1000 TABLET ORAL EVERY 8 HOURS PRN
Status: DISCONTINUED | OUTPATIENT
Start: 2022-09-14 | End: 2022-09-16 | Stop reason: HOSPADM

## 2022-09-14 RX ORDER — POLYETHYLENE GLYCOL 3350 17 G/17G
17 POWDER, FOR SOLUTION ORAL DAILY
Status: DISCONTINUED | OUTPATIENT
Start: 2022-09-14 | End: 2022-09-16 | Stop reason: HOSPADM

## 2022-09-14 RX ORDER — INSULIN ASPART 100 [IU]/ML
0-5 INJECTION, SOLUTION INTRAVENOUS; SUBCUTANEOUS
Status: DISCONTINUED | OUTPATIENT
Start: 2022-09-14 | End: 2022-09-16 | Stop reason: HOSPADM

## 2022-09-14 RX ORDER — AMOXICILLIN 250 MG
1 CAPSULE ORAL DAILY
Status: DISCONTINUED | OUTPATIENT
Start: 2022-09-15 | End: 2022-09-16 | Stop reason: HOSPADM

## 2022-09-14 RX ORDER — FUROSEMIDE 40 MG/1
40 TABLET ORAL DAILY
Status: DISCONTINUED | OUTPATIENT
Start: 2022-09-14 | End: 2022-09-16 | Stop reason: HOSPADM

## 2022-09-14 RX ADMIN — MIRTAZAPINE 15 MG: 15 TABLET, FILM COATED ORAL at 08:09

## 2022-09-14 RX ADMIN — POLYETHYLENE GLYCOL 3350 17 G: 17 POWDER, FOR SOLUTION ORAL at 04:09

## 2022-09-14 RX ADMIN — Medication 400 MG: at 08:09

## 2022-09-14 RX ADMIN — Medication 400 MG: at 04:09

## 2022-09-14 RX ADMIN — FUROSEMIDE 40 MG: 40 TABLET ORAL at 04:09

## 2022-09-14 NOTE — H&P (VIEW-ONLY)
[facility] Follow up.        Attending Physician: Pete Baker MD.  The patient's last visit with me was on 11/3/2021.         HPI.   66 yo BM with stage D CHF due to non-ischemic cardiomyopathy status post DT HM3 implantation 1/13/2021 with sternal closure 1/14/2021 with unremarkable post-operative course.  He also has PAF, HTN, HLD, DM2.     He was admitted for syncope 1/27/2022 at which time he was found to have a evolving right cerebral acute subdural hematoma and acute basal cistern subarachnoid hemorrhage. He requiring intubation with prolonged hospitalization and was incidentally found to be positive for COVID-19 and treated with remdesivir. He was taken off of aspirin and discharged home 2/17/2022. He was also admitted 3/2022 for 3 days due to dysequilibrium, diplopia and repeat CT head was stable. Neurology recommended myasthenia gravis workup which was negative.      In clinic 7/13/2022 at which time due falls and orthostatic BP with lightheadedness, lisinopril was reduced to 5 mg qd, amlodipine reduced to 5 mg qd and Lasix and changed to 40 mg to take only as needed if weight goes up by >3 lbs in one day or >5 lbs in one week.  However he stopped amlodipine and lisinopril completely.  He went to ED for HA and had negative CT head.  He was restarted on amlodipine 5 mg and lisinopril 5 mg qd.    Today he comes feeling ok. His HGB is 6.8 today, down from 9.8 1 month ago. His INR 4.3. He denies melena, hematochezia, epistaxis, or other overt bleeding. He said he had episode of constipation with long straining and hard stools few days ago.       Review of Systems   Constitutional:  Negative for activity change, appetite change, chills, diaphoresis, fatigue, fever and unexpected weight change.   HENT:  Negative for nasal congestion, rhinorrhea and sore throat.    Eyes:  Negative  for visual disturbance.   Respiratory:  Negative for cough, choking, chest tightness and shortness of breath.    Cardiovascular:  Negative for chest pain, palpitations and leg swelling.   Gastrointestinal:  Negative for abdominal distention, abdominal pain, diarrhea, nausea and vomiting.   Genitourinary:  Negative for difficulty urinating, dysuria and hematuria.   Integumentary:  Negative for rash.   Neurological:  Negative for seizures, syncope and light-headedness.   Psychiatric/Behavioral:  Negative for agitation and hallucinations.       Past Medical History:   Diagnosis Date    Acute respiratory failure requiring reintubation 1/28/2022    CLARISSE (acute kidney injury) 1/11/2021    Diabetes mellitus     Kidney stones         Past Surgical History:   Procedure Laterality Date    CARDIAC CATHETERIZATION  2010    no stents    CARDIAC DEFIBRILLATOR PLACEMENT  2010    CARDIAC DEFIBRILLATOR PLACEMENT      HERNIA REPAIR      IRRIGATION OF MEDIASTINUM N/A 1/14/2021    Procedure: IRRIGATION, MEDIASTINUM;  Surgeon: Zenon Corona MD;  Location: [facility] OR [address] [place] [address];  Service: Cardiovascular;  Laterality: N/A;    KNEE ARTHROSCOPY Right     LEFT VENTRICULAR ASSIST DEVICE Left 1/13/2021    Procedure: INSERTION- HeartMate 3 LEFT VENTRICULAR ASSIST DEVICE ;  Surgeon: Zenon Corona MD;  [facility];  Service: Cardiovascular;  Laterality: Left;    RECONSTRUCTION OF PERICARDIUM N/A 1/14/2021    Procedure: RECONSTRUCTION, PERICARDIUM;  Surgeon: Zenon Corona MD;  [facility];  Service: Cardiovascular;  Laterality: N/A;    RIGHT HEART CATHETERIZATION Right 11/2/2020    Procedure: INSERTION, CATHETER, RIGHT HEART;  Surgeon: Deana Lake MD;  Location: [facility] CATH LAB;  Service: Cardiology;  Laterality: Right;    RIGHT HEART CATHETERIZATION Right 3/24/2022    Procedure: INSERTION, CATHETER, RIGHT HEART;  Surgeon: Manuel Ramos Jr., MD;  Location: [facility] CATH LAB;  Service: Cardiology;  Laterality: Right;     STERNAL WOUND CLOSURE  1/13/2021    Procedure: TEMPORARY CLOSURE OF CHEST;  Surgeon: Zenon Corona MD;  Location: [facility] OR 2ND FLR;  Service: Cardiovascular;;    STERNAL WOUND CLOSURE N/A 1/14/2021    Procedure: CLOSURE, WOUND, STERNUM;  Surgeon: Zenon Corona MD;  Location: [facility] OR 2ND FLR;  Service: Cardiovascular;  Laterality: N/A;        Family History   Problem Relation Age of Onset    Heart attack Mother     Heart failure Brother     Heart attack Brother     Hypertension Brother         Review of patient's allergies indicates:   Allergen Reactions    Pcn [penicillins] Hives        Current Outpatient Medications   Medication Instructions    acetaminophen (TYLENOL) 1,000 mg, Oral, Every 8 hours PRN    amLODIPine (NORVASC) 5 mg, Oral, Daily    atorvastatin (LIPITOR) 80 MG tablet Take 1 tablet by mouth once daily    digoxin (LANOXIN) 0.125 mg, Oral, Daily    docusate sodium (COLACE) 100 mg, Oral, Daily    furosemide (LASIX) 40 mg, Oral, Daily    lisinopriL (PRINIVIL,ZESTRIL) 5 mg, Oral, Daily    magnesium oxide (MAG-OX) 400 mg, Oral, 3 times daily    mirtazapine (REMERON) 15 mg, Oral, Nightly    polyethylene glycol (GLYCOLAX) 17 gram PwPk Mix 1 packet (17 g) with liquid and take by mouth daily as needed.    SITagliptin (JANUVIA) 100 mg, Oral, Daily    spironolactone (ALDACTONE) 25 mg, Oral, Daily    warfarin (COUMADIN) 5 mg, Oral, Daily        Vitals:    09/14/22 0942   BP: (!) 90/0   Temp: 97.9 °F (36.6 °C)        Wt Readings from Last 3 Encounters:   09/14/22 77.6 kg (171 lb)   09/14/22 77.6 kg (171 lb)   08/11/22 77.6 kg (171 lb)     Temp Readings from Last 3 Encounters:   09/14/22 97.9 °F (36.6 °C)   08/11/22 98.1 °F (36.7 °C) (Oral)   07/13/22 98.2 °F (36.8 °C) (Oral)     BP Readings from Last 3 Encounters:   09/14/22 (!) 90/0   08/11/22 (!) 102/0   07/13/22 (!) 58/0     Pulse Readings from Last 3 Encounters:   05/11/22 101   05/11/22 100   04/21/22 107        Body mass index is 21.96 kg/m². Estimated body  "surface area is 2.01 meters squared as calculated from the following:    Height as of this encounter: 6' 2" (1.88 m).    Weight as of this encounter: 77.6 kg (171 lb).     Physical Exam  Constitutional:       Appearance: He is well-developed.   HENT:      Head: Normocephalic and atraumatic.      Right Ear: External ear normal.      Left Ear: External ear normal.   Eyes:      Conjunctiva/sclera: Conjunctivae normal.      Pupils: Pupils are equal, round, and reactive to light.   Neck:      Vascular: No hepatojugular reflux or JVD.   Cardiovascular:      Rate and Rhythm: Normal rate and regular rhythm.      Pulses: Intact distal pulses.      Heart sounds: No murmur heard.    No friction rub. No gallop.      Comments: Normal LVAD hum  Pulmonary:      Effort: Pulmonary effort is normal.      Breath sounds: Normal breath sounds.   Abdominal:      General: Bowel sounds are normal. There is no distension.      Palpations: Abdomen is soft.      Tenderness: There is no abdominal tenderness. There is no guarding or rebound.   Musculoskeletal:      Cervical back: Normal range of motion and neck supple.      Right lower leg: No edema.      Left lower leg: No edema.   Neurological:      Mental Status: He is alert and oriented to person, place, and time.        Lab Results   Component Value Date     (H) 08/11/2022     09/14/2022    K 3.8 09/14/2022    MG 1.9 09/14/2022     09/14/2022    CO2 25 09/14/2022    BUN 12 09/14/2022    CREATININE 0.9 09/14/2022     09/14/2022    HGBA1C 7.7 (H) 01/28/2022    AST 19 09/14/2022    ALT 13 09/14/2022    ALBUMIN 2.9 (L) 09/14/2022    PROT 10.2 (H) 09/14/2022    BILITOT 1.5 (H) 09/14/2022    WBC 15.93 (H) 09/14/2022    HGB 6.8 (L) 09/14/2022    HCT 22.1 (L) 09/14/2022    HCT 30 (L) 03/22/2022     09/14/2022    INR 4.3 (H) 09/14/2022    INR 2.5 09/08/2022     09/14/2022    TSH 1.420 11/16/2020    CHOL 129 07/13/2022    HDL 47 07/13/2022    LDLCALC 65.2 " 07/13/2022    TRIG 84 07/13/2022         Results for orders placed during the hospital encounter of 05/11/22    Echo    Interpretation Summary  · There is an LVAD present. Base speed is 5100 RPMs. The pump type is a Heartmate III. The interventricular septum appears to bow into the right ventricle. The aortic valve opens intermittently.  · Mild left atrial enlargement.  · The left ventricle is severely enlarged with severely decreased systolic function.  · There is severe left ventricular global hypokinesis. The estimated ejection fraction is 15%.  · Left ventricular diastolic dysfunction.  · Mild right ventricular enlargement with moderately reduced right ventricular systolic function.  · Mild-to-moderate mitral regurgitation.  · Moderate tricuspid regurgitation.  · Elevated central venous pressure (15 mmHg).  · The estimated PA systolic pressure is 32 mmHg.        Results for orders placed during the hospital encounter of 03/22/22    Cardiac catheterization    Conclusion  · The estimated blood loss was <50 mL.    HM 3 @ 5100:  Right atrial pressure is mildly elevated.  Pulmonary capillary wedge pressure is mildly elevated.  Pulmonary artery pressure is normal.  Pulmonary vascular resistance is normal.  Systemic vascular resistance is 749.  Monse cardiac output and index is normal.    I certify that I was present for catheter insertion, catheter manipulation and hemodynamic interpretation of this patient.    The procedure log was documented by No documenter listed and verified by Manuel Ramos Jr, MD.    Date: 3/24/2022  Time: 5:34 PM         Assessment and Plan:  LVAD (left ventricular assist device) present    Heart replaced by heart assist device  -     LVAD Interrogations Out Patient Only  -     LVAD Maintenance  -     LVAD Maintenance    NICM (nonischemic cardiomyopathy)    Anticoagulated    Acute blood loss anemia    Type 2 diabetes mellitus without complication, without long-term current use of  insulin    ICD (implantable cardioverter-defibrillator) in place    Essential hypertension    Chronic combined systolic and diastolic heart failure    Paroxysmal atrial fibrillation        Chronic cardiomyopathy and systolic HF, NYHA class 2, stage D, s/p LVAD.  Hemodynamic status: warm, hypertensive, low in volume.  I personally reviewed echo images.    Antithrombotic therapy and target anticoagulation: INR/Prothrombin Time 4.3. Adjustment to warfarin dose per anticoagulation clinic.      LVAD related complications:   -Bleeding (gastrointestinal, epistaxis, etc): suspected episode of bleeding. Needs GI w/u.  -RV failure:  none.  -Thrombotic events and LDH:  none, ld 173.   -DLES and Infection: 1, none.    Plan to admit patient for GI w/u, considering the rapid drop of 3 grams of hemoglobin to a low level of 6.8 g/dl.      Interrogation of Ventricular assist device was performed with physician analysis of device parameters and review of device function. I have personally reviewed the interrogation findings and agree with findings as stated.     Additionally, the patient has a patient centered goal of being able to improve their quality of life .

## 2022-09-14 NOTE — PROGRESS NOTES
After discussing equipment with patient, patient needed a new shower bag.   Lot#[id number] given to patient today in clinic and patient educated on proper use of item and maintenance. This shower bag is medically necessary for proper care and maintenance of LVAD system. MCS connect updated and VAD snapshot updated today.It is medically necessary to ensure patient has properly functioning equipment and wearables to prevent infection, injury or death to patient.      Back up controller charged and self test passed. This is medically necessary for the proper function of the LVAD system

## 2022-09-14 NOTE — PROGRESS NOTES
"Date of Implant with Heartmate 3 LVAD: 1/13/21    PATIENT ARRIVED IN CLINIC:  Ambulatory   Accompanied by: Wife  Complaints/reason for visit today: routine    Vitals  Temperature, oral:   Temp Readings from Last 1 Encounters:   09/14/22 97.9 °F (36.6 °C)     Blood Pressure:   BP Readings from Last 3 Encounters:   09/14/22 (!) 90/0   08/11/22 (!) 102/0   07/13/22 (!) 58/0        VAD Interrogation:  TXP JACEY INTERROGATIONS 9/14/2022 8/11/2022 7/13/2022   Type HeartMate3 HeartMate3 HeartMate3   Flow 4.5 3.8 4.9   Speed 5100 5200 5000   PI 3.5 7.8 2.7   Power (Edwards) 4.8 3.7 3.7   LSL 4700 4800 4700   Pulsatility Intermittent pulse Pulse No Pulse     HCT:   Lab Results   Component Value Date    HCT 22.1 (L) 09/14/2022    HCT 30 (L) 03/22/2022     Problems / Issues / Alarms with VAD if any: None noted  VAD Sounds: HM3     VAD Binder With Patient: no   Reviewed VAD Numbers In Binder: no  Enrolled in Care Companion: yes    Equipment:  Emergency Equipment With Patient: yes   Any Equipment Issues: None noted   It is medically necessary to ensure patient has properly functioning equipment and wearables to prevent infection, injury or death to patient.     DLES Assessment:  Appearance Of Driveline: "1"  Antibiotics: NO  Velour: no  Manual & Visual Inspection Of Driveline: No kinks or tears noted  Stabilization Device In Use: yes, [place] securement device    Heartmate 3 Module Cable:  No yellow exposed and Attempted to unscrew modular cable to ensure it will be able to come lose in the event we ever need to change the modular cable while patient held the driveline in place so it would not move. Modular cable connection able to be unscrewed and re-tightened. Instructed pt to perform this weekly.    Patient MyChart Questionnaire: No flowsheet data found.     Assessment:   PAIN: NO  Complaints Of Nausea / Vomiting: None noted    Appearance and Frequency Of Stools: normal and formed without blood & daily  Color Of Urine: " clear/yellow  Coping/Depression/Anxiety: coping okay  Sleep Habits: 4-5 hrs /night  Sleep Aids: None noted  Showering: No  Activity/Exercise: walking everyday   Driving: Yes. Reminded to pull over should there be an alarm before looking down at controller.    DLES Dressing Care:   Frequency of Dressing Changes: daily & daily kit  Pt In Need Of Management Kits?:yes -   2 Box of daily kit  It is medically necessary to have VAD management kits in order to prevent infection or to assist in the healing of an infected DLES.    Labs:    Chemistry        Component Value Date/Time     09/14/2022 0821    K 3.8 09/14/2022 0821     09/14/2022 0821    CO2 25 09/14/2022 0821    BUN 12 09/14/2022 0821    CREATININE 0.9 09/14/2022 0821     09/14/2022 0821        Component Value Date/Time    CALCIUM 9.7 09/14/2022 0821    ALKPHOS 135 09/14/2022 0821    AST 19 09/14/2022 0821    ALT 13 09/14/2022 0821    BILITOT 1.5 (H) 09/14/2022 0821    ESTGFRAFRICA 55.3 (A) 07/13/2022 0834    EGFRNONAA 47.8 (A) 07/13/2022 0834            Magnesium   Date Value Ref Range Status   09/14/2022 1.9 1.6 - 2.6 mg/dL Final       Lab Results   Component Value Date    WBC 15.93 (H) 09/14/2022    HGB 6.8 (L) 09/14/2022    HCT 22.1 (L) 09/14/2022    MCV 90 09/14/2022     09/14/2022       Lab Results   Component Value Date    INR 4.3 (H) 09/14/2022    INR 2.5 09/08/2022    INR 2.4 09/01/2022       BNP   Date Value Ref Range Status   08/11/2022 248 (H) 0 - 99 pg/mL Final     Comment:     Values of less than 100 pg/ml are consistent with non-CHF populations.   07/13/2022 107 (H) 0 - 99 pg/mL Final     Comment:     Values of less than 100 pg/ml are consistent with non-CHF populations.   05/11/2022 910 (H) 0 - 99 pg/mL Final     Comment:     Values of less than 100 pg/ml are consistent with non-CHF populations.       LD   Date Value Ref Range Status   09/14/2022 173 110 - 260 U/L Final     Comment:     Results are increased in hemolyzed  samples.   08/11/2022 190 110 - 260 U/L Final     Comment:     Results are increased in hemolyzed samples.   07/13/2022 178 110 - 260 U/L Final     Comment:     Results are increased in hemolyzed samples.       Labs reviewed with patient: YES, H/H decreased.     Medication reconciliation: per MA.  Medication Detail updated today: no  Coumadin Managed by: [facility]    Education: Reviewed driveline care, emergency procedures, how to change the controller, alarms with patient, as well as discussed how to page the VAD coordinator in case of an emergency.   Covid - 19 education: Reminded patient/caregiver to check temperature daily and call us if it is > 99.0.  Reminded them  to stay 6 feet away from other people, wash hands frequently, don't touch your face and stay home except to get labs, medications, and appts.    Covid Vaccine: Pt informed that we are encouraging all VAD patients to receive the COVID vaccine.  Informed pt that they can take tylenol but should avoid other NSAIDs.      Plans/Needs: Admit to hospital for decreased H/H.  Patient asymptomatic, denies blood in stool or nosebleeds.  Refer to MD notes.   Talked with Mrs. France today about CPR.  I explained that if 911 is ever called and the emergency responders want to perform chest compressions that is ok to do. Mrs. France verbalized understanding and agreement.     Hurricane Season: Yes, discussed with patient: With hurricane season approaching, we want to make sure you are fully prepared for any emergency.  Should the National Weather Service or your local authorities recommend a voluntary or mandatory evacuation of your area, The VAD team requires you to evacuate to a safe place.  Remember, when it is a mandatory evacuation, traffic will become an issue for your limited battery power.  Therefore, we strongly urge you to evacuate early.      The VAD team advises you to have the following in place before hurricane season:  Have an evacuation  plan in place including places to evacuate, names and phone numbers.  This information is required to be given to the VAD coordinator.  Have your VAD emergency contact numbers with you.  Make sure your prescriptions will not run out by the end of September.  Make sure you have enough medications, including pills, inhalers, patches,     etc. to take, should you be gone for more than 2 weeks.  Make sure ALL of your batteries are fully charged.  Bring enough dressing change supplies to last for at least 2 weeks.  Bring your VAD binder with you.  Make sure your binder is updated and complete with alarms reference card, patient hand book, emergency contact numbers, daily log sheets, etc.    If you do not have family or friends as an evacuation destination, we recommend evacuating to a safe area.   Do NOT evacuate to [facility].  The VAD team wants to stress the importance of planning for your evacuation in the event of a hurricane.  If you have any LVAD questions or issues, please contact the LVAD coordinator.

## 2022-09-14 NOTE — PROGRESS NOTES
Meme verified correct Warfarin dose, patient reports having spinach on 9/11/22, had a yenny on 9/9/22, no other changes noted.

## 2022-09-14 NOTE — NURSING TRANSFER
Nursing Transfer Note      9/14/2022     Reason patient is being transferred: low hgb    Transfer To: room 310    Transfer via pr ambulated to room    Any special needs or follow-up needed: LVAD    Chart send with patient: No    Notified: spouse    Patient reassessed at: 1330 09/14/2022     Upon arrival to floor: cardiac monitor applied, patient oriented to room, call bell in reach, and bed in lowest position

## 2022-09-14 NOTE — PROGRESS NOTES
09/14/22 1609   Device   Type HeartMate3   Flow 4.6   Speed 5200   PI 5.2   Power (Edwards) 3.6   LSL 4800   Pulsatility Pulse     LVAD speed changed from 5100 to 5200 per order, Low speed limit set to 4800. Witness with NATHANAEL Law at bedside.

## 2022-09-14 NOTE — HPI
Reason for Consult: Management of T2DM, Hyperglycemia     Surgical Procedure and Date: HM3 implantation 1/13/21    Diabetes diagnosis year: > 5 years ago    Home Diabetes Medications:  Januvia 50 mg daily     How often checking glucose at home? Once daily in the morning   BG readings on regimen: low 100s  Hypoglycemia on the regimen?  No  Missed doses on regimen?  No    Diabetes Complications include:     None     Complicating diabetes co morbidities:   CHF       HPI:   Patient is a 67 y.o. male with a diagnosis of stage D CHF due to non-ischemic cardiomyopathy status post DT HM3 implantation 1/13/2021 with sternal closure 1/14/2021 with unremarkable post-operative course.  He also has PAF, HTN, HLD, DM2. Patient being admitted from clinic for GI w/u do to rapid drop of 3 grams of hemoglobin to a low level of 6.8 g/dl. Endocrinology consulted for management of T2DM.        Lab Results   Component Value Date    HGBA1C 7.7 (H) 01/28/2022

## 2022-09-14 NOTE — PROGRESS NOTES
TXP JACEY INTERROGATIONS 9/14/2022 8/11/2022 7/13/2022 5/11/2022 3/26/2022 3/26/2022 3/26/2022   Type HeartMate3 HeartMate3 HeartMate3 HeartMate3 - - -   Flow 4.5 3.8 4.9 4.0 - - -   Speed 5100 5200 5000 5100 - - -   PI 3.5 7.8 2.7 6.2 - - -   Power (Edwards) 4.8 3.7 3.7 3.6 - - -   LSL 4700 4800 4700 4700 - - -   Pulsatility Intermittent pulse Pulse No Pulse Pulse Pulse Pulse Pulse   }

## 2022-09-14 NOTE — ASSESSMENT & PLAN NOTE
BG goal 140-180    Continue Low Dose Correction Scale  BG monitoring ac/hs  Can consider restarting home Januvia if prandial BG excursions noted    ** Please call Endocrine for any BG related issues **    Discharge plans: TBD    Lab Results   Component Value Date    HGBA1C 7.7 (H) 01/28/2022

## 2022-09-14 NOTE — PROGRESS NOTES
[facility] Follow up.        Attending Physician: Pete Baker MD.  The patient's last visit with me was on 11/3/2021.         HPI.   66 yo BM with stage D CHF due to non-ischemic cardiomyopathy status post DT HM3 implantation 1/13/2021 with sternal closure 1/14/2021 with unremarkable post-operative course.  He also has PAF, HTN, HLD, DM2.     He was admitted for syncope 1/27/2022 at which time he was found to have a evolving right cerebral acute subdural hematoma and acute basal cistern subarachnoid hemorrhage. He requiring intubation with prolonged hospitalization and was incidentally found to be positive for COVID-19 and treated with remdesivir. He was taken off of aspirin and discharged home 2/17/2022. He was also admitted 3/2022 for 3 days due to dysequilibrium, diplopia and repeat CT head was stable. Neurology recommended myasthenia gravis workup which was negative.      In clinic 7/13/2022 at which time due falls and orthostatic BP with lightheadedness, lisinopril was reduced to 5 mg qd, amlodipine reduced to 5 mg qd and Lasix and changed to 40 mg to take only as needed if weight goes up by >3 lbs in one day or >5 lbs in one week.  However he stopped amlodipine and lisinopril completely.  He went to ED for HA and had negative CT head.  He was restarted on amlodipine 5 mg and lisinopril 5 mg qd.    Today he comes feeling ok. His HGB is 6.8 today, down from 9.8 1 month ago. His INR 4.3. He denies melena, hematochezia, epistaxis, or other overt bleeding. He said he had episode of constipation with long straining and hard stools few days ago.       Review of Systems   Constitutional:  Negative for activity change, appetite change, chills, diaphoresis, fatigue, fever and unexpected weight change.   HENT:  Negative for nasal congestion, rhinorrhea and sore throat.    Eyes:  Negative  for visual disturbance.   Respiratory:  Negative for cough, choking, chest tightness and shortness of breath.    Cardiovascular:  Negative for chest pain, palpitations and leg swelling.   Gastrointestinal:  Negative for abdominal distention, abdominal pain, diarrhea, nausea and vomiting.   Genitourinary:  Negative for difficulty urinating, dysuria and hematuria.   Integumentary:  Negative for rash.   Neurological:  Negative for seizures, syncope and light-headedness.   Psychiatric/Behavioral:  Negative for agitation and hallucinations.       Past Medical History:   Diagnosis Date    Acute respiratory failure requiring reintubation 1/28/2022    CLARISSE (acute kidney injury) 1/11/2021    Diabetes mellitus     Kidney stones         Past Surgical History:   Procedure Laterality Date    CARDIAC CATHETERIZATION  2010    no stents    CARDIAC DEFIBRILLATOR PLACEMENT  2010    CARDIAC DEFIBRILLATOR PLACEMENT      HERNIA REPAIR      IRRIGATION OF MEDIASTINUM N/A 1/14/2021    Procedure: IRRIGATION, MEDIASTINUM;  Surgeon: Zenon Corona MD;  Location: [facility] OR [address] [place] [address];  Service: Cardiovascular;  Laterality: N/A;    KNEE ARTHROSCOPY Right     LEFT VENTRICULAR ASSIST DEVICE Left 1/13/2021    Procedure: INSERTION- HeartMate 3 LEFT VENTRICULAR ASSIST DEVICE ;  Surgeon: Zenon Corona MD;  [facility];  Service: Cardiovascular;  Laterality: Left;    RECONSTRUCTION OF PERICARDIUM N/A 1/14/2021    Procedure: RECONSTRUCTION, PERICARDIUM;  Surgeon: Zenon Corona MD;  [facility];  Service: Cardiovascular;  Laterality: N/A;    RIGHT HEART CATHETERIZATION Right 11/2/2020    Procedure: INSERTION, CATHETER, RIGHT HEART;  Surgeon: Deana Lake MD;  Location: [facility] CATH LAB;  Service: Cardiology;  Laterality: Right;    RIGHT HEART CATHETERIZATION Right 3/24/2022    Procedure: INSERTION, CATHETER, RIGHT HEART;  Surgeon: Manuel Ramos Jr., MD;  Location: [facility] CATH LAB;  Service: Cardiology;  Laterality: Right;     STERNAL WOUND CLOSURE  1/13/2021    Procedure: TEMPORARY CLOSURE OF CHEST;  Surgeon: Zenon Corona MD;  Location: [facility] OR 2ND FLR;  Service: Cardiovascular;;    STERNAL WOUND CLOSURE N/A 1/14/2021    Procedure: CLOSURE, WOUND, STERNUM;  Surgeon: Zenon Corona MD;  Location: [facility] OR 2ND FLR;  Service: Cardiovascular;  Laterality: N/A;        Family History   Problem Relation Age of Onset    Heart attack Mother     Heart failure Brother     Heart attack Brother     Hypertension Brother         Review of patient's allergies indicates:   Allergen Reactions    Pcn [penicillins] Hives        Current Outpatient Medications   Medication Instructions    acetaminophen (TYLENOL) 1,000 mg, Oral, Every 8 hours PRN    amLODIPine (NORVASC) 5 mg, Oral, Daily    atorvastatin (LIPITOR) 80 MG tablet Take 1 tablet by mouth once daily    digoxin (LANOXIN) 0.125 mg, Oral, Daily    docusate sodium (COLACE) 100 mg, Oral, Daily    furosemide (LASIX) 40 mg, Oral, Daily    lisinopriL (PRINIVIL,ZESTRIL) 5 mg, Oral, Daily    magnesium oxide (MAG-OX) 400 mg, Oral, 3 times daily    mirtazapine (REMERON) 15 mg, Oral, Nightly    polyethylene glycol (GLYCOLAX) 17 gram PwPk Mix 1 packet (17 g) with liquid and take by mouth daily as needed.    SITagliptin (JANUVIA) 100 mg, Oral, Daily    spironolactone (ALDACTONE) 25 mg, Oral, Daily    warfarin (COUMADIN) 5 mg, Oral, Daily        Vitals:    09/14/22 0942   BP: (!) 90/0   Temp: 97.9 °F (36.6 °C)        Wt Readings from Last 3 Encounters:   09/14/22 77.6 kg (171 lb)   09/14/22 77.6 kg (171 lb)   08/11/22 77.6 kg (171 lb)     Temp Readings from Last 3 Encounters:   09/14/22 97.9 °F (36.6 °C)   08/11/22 98.1 °F (36.7 °C) (Oral)   07/13/22 98.2 °F (36.8 °C) (Oral)     BP Readings from Last 3 Encounters:   09/14/22 (!) 90/0   08/11/22 (!) 102/0   07/13/22 (!) 58/0     Pulse Readings from Last 3 Encounters:   05/11/22 101   05/11/22 100   04/21/22 107        Body mass index is 21.96 kg/m². Estimated body  "surface area is 2.01 meters squared as calculated from the following:    Height as of this encounter: 6' 2" (1.88 m).    Weight as of this encounter: 77.6 kg (171 lb).     Physical Exam  Constitutional:       Appearance: He is well-developed.   HENT:      Head: Normocephalic and atraumatic.      Right Ear: External ear normal.      Left Ear: External ear normal.   Eyes:      Conjunctiva/sclera: Conjunctivae normal.      Pupils: Pupils are equal, round, and reactive to light.   Neck:      Vascular: No hepatojugular reflux or JVD.   Cardiovascular:      Rate and Rhythm: Normal rate and regular rhythm.      Pulses: Intact distal pulses.      Heart sounds: No murmur heard.    No friction rub. No gallop.      Comments: Normal LVAD hum  Pulmonary:      Effort: Pulmonary effort is normal.      Breath sounds: Normal breath sounds.   Abdominal:      General: Bowel sounds are normal. There is no distension.      Palpations: Abdomen is soft.      Tenderness: There is no abdominal tenderness. There is no guarding or rebound.   Musculoskeletal:      Cervical back: Normal range of motion and neck supple.      Right lower leg: No edema.      Left lower leg: No edema.   Neurological:      Mental Status: He is alert and oriented to person, place, and time.        Lab Results   Component Value Date     (H) 08/11/2022     09/14/2022    K 3.8 09/14/2022    MG 1.9 09/14/2022     09/14/2022    CO2 25 09/14/2022    BUN 12 09/14/2022    CREATININE 0.9 09/14/2022     09/14/2022    HGBA1C 7.7 (H) 01/28/2022    AST 19 09/14/2022    ALT 13 09/14/2022    ALBUMIN 2.9 (L) 09/14/2022    PROT 10.2 (H) 09/14/2022    BILITOT 1.5 (H) 09/14/2022    WBC 15.93 (H) 09/14/2022    HGB 6.8 (L) 09/14/2022    HCT 22.1 (L) 09/14/2022    HCT 30 (L) 03/22/2022     09/14/2022    INR 4.3 (H) 09/14/2022    INR 2.5 09/08/2022     09/14/2022    TSH 1.420 11/16/2020    CHOL 129 07/13/2022    HDL 47 07/13/2022    LDLCALC 65.2 " 07/13/2022    TRIG 84 07/13/2022         Results for orders placed during the hospital encounter of 05/11/22    Echo    Interpretation Summary  · There is an LVAD present. Base speed is 5100 RPMs. The pump type is a Heartmate III. The interventricular septum appears to bow into the right ventricle. The aortic valve opens intermittently.  · Mild left atrial enlargement.  · The left ventricle is severely enlarged with severely decreased systolic function.  · There is severe left ventricular global hypokinesis. The estimated ejection fraction is 15%.  · Left ventricular diastolic dysfunction.  · Mild right ventricular enlargement with moderately reduced right ventricular systolic function.  · Mild-to-moderate mitral regurgitation.  · Moderate tricuspid regurgitation.  · Elevated central venous pressure (15 mmHg).  · The estimated PA systolic pressure is 32 mmHg.        Results for orders placed during the hospital encounter of 03/22/22    Cardiac catheterization    Conclusion  · The estimated blood loss was <50 mL.    HM 3 @ 5100:  Right atrial pressure is mildly elevated.  Pulmonary capillary wedge pressure is mildly elevated.  Pulmonary artery pressure is normal.  Pulmonary vascular resistance is normal.  Systemic vascular resistance is 749.  Monse cardiac output and index is normal.    I certify that I was present for catheter insertion, catheter manipulation and hemodynamic interpretation of this patient.    The procedure log was documented by No documenter listed and verified by Manuel Ramos Jr, MD.    Date: 3/24/2022  Time: 5:34 PM         Assessment and Plan:  LVAD (left ventricular assist device) present    Heart replaced by heart assist device  -     LVAD Interrogations Out Patient Only  -     LVAD Maintenance  -     LVAD Maintenance    NICM (nonischemic cardiomyopathy)    Anticoagulated    Acute blood loss anemia    Type 2 diabetes mellitus without complication, without long-term current use of  insulin    ICD (implantable cardioverter-defibrillator) in place    Essential hypertension    Chronic combined systolic and diastolic heart failure    Paroxysmal atrial fibrillation        Chronic cardiomyopathy and systolic HF, NYHA class 2, stage D, s/p LVAD.  Hemodynamic status: warm, hypertensive, low in volume.  I personally reviewed echo images.    Antithrombotic therapy and target anticoagulation: INR/Prothrombin Time 4.3. Adjustment to warfarin dose per anticoagulation clinic.      LVAD related complications:   -Bleeding (gastrointestinal, epistaxis, etc): suspected episode of bleeding. Needs GI w/u.  -RV failure:  none.  -Thrombotic events and LDH:  none, ld 173.   -DLES and Infection: 1, none.    Plan to admit patient for GI w/u, considering the rapid drop of 3 grams of hemoglobin to a low level of 6.8 g/dl.      Interrogation of Ventricular assist device was performed with physician analysis of device parameters and review of device function. I have personally reviewed the interrogation findings and agree with findings as stated.     Additionally, the patient has a patient centered goal of being able to improve their quality of life .

## 2022-09-14 NOTE — PLAN OF CARE
VSS. UA collected. BG monitored. LVAD number and doppler WNL. LVAD dressing CDI. Pt educated on fall risk and remained free from falls/trauma/injury. Denies chest pain, SOB, palpitations, dizziness, pain, or discomfort. Plan of care reviewed with pt, all questions answered. Bed locked in lowest position, call bell within reach, no acute distress noted, will continue to monitor.

## 2022-09-14 NOTE — INTERVAL H&P NOTE
The patient has been examined and the H&P has been reviewed:    I concur with the findings and no changes have occurred since H&P was written.    Patient admitted from clinic today with concerns for GIB given drop in hbg by 3 units. Patient reports no signs of bleeding. ROS negative except for constipation a couple of days ago with large BM yesterday. Normal in color (brown). INR supratherapeutic, CBC with leukocytosis, and CRP elevated. Will workup for infection and GIB. Iron studies also sent. GI consult placed and CT C/A/P ordered.      Active Hospital Problems    Diagnosis  POA    *LVAD (left ventricular assist device) present [Z95.811]  Not Applicable    Type 2 diabetes mellitus without complication [E11.9]  Yes      Resolved Hospital Problems   No resolved problems to display.

## 2022-09-14 NOTE — CONSULTS
[facility]  Endocrinology  Diabetes Consult Note    Consult Requested by: Manuel Ramos Jr.,*   Reason for admit: LVAD (left ventricular assist device) present    HISTORY OF PRESENT ILLNESS:  Reason for Consult: Management of T2DM, Hyperglycemia     Surgical Procedure and Date: HM3 implantation 21    Diabetes diagnosis year: > 5 years ago    Home Diabetes Medications:  Januvia 50 mg daily     How often checking glucose at home? Once daily in the morning   BG readings on regimen: low 100s  Hypoglycemia on the regimen?  No  Missed doses on regimen?  No    Diabetes Complications include:     None     Complicating diabetes co morbidities:   CHF       HPI:   Patient is a 67 y.o. male with a diagnosis of stage D CHF due to non-ischemic cardiomyopathy status post DT HM3 implantation 2021 with sternal closure 2021 with unremarkable post-operative course.  He also has PAF, HTN, HLD, DM2. Patient being admitted from clinic for GI w/u do to rapid drop of 3 grams of hemoglobin to a low level of 6.8 g/dl. Endocrinology consulted for management of T2DM.        Lab Results   Component Value Date    HGBA1C 7.7 (H) 2022           Interval HPI:   Overnight events: Admitted to CSU. BG below goal ranges (109) this morning. Diet Cardiac  Eatin%  Nausea: No  Hypoglycemia and intervention: No  Fever: No  TPN and/or TF: No  If yes, type of TF/TPN and rate: n/a    PMH, PSH, FH, SH reviewed     ROS:  Constitutional: Negative for weight changes.  Eyes: Negative for visual disturbance.  Respiratory: Negative for cough.   Cardiovascular: Negative for chest pain.  Gastrointestinal: Negative for nausea.  Endocrine: Negative for polyuria, polydipsia.  Musculoskeletal: Negative for back pain.  Skin: Negative for rash.  Neurological: Negative for syncope.  Psychiatric/Behavioral: Negative for depression.    Review of Systems    Current Medications and/or Treatments Impacting Glycemic  Control  Immunotherapy:    Immunosuppressants       None          Steroids:   Hormones (From admission, onward)      None          Pressors:    Autonomic Drugs (From admission, onward)      None          Hyperglycemia/Diabetes Medications:   Antihyperglycemics (From admission, onward)      Start     Stop Route Frequency Ordered    09/14/22 1622  insulin aspart U-100 pen 0-5 Units         -- SubQ Before meals & nightly PRN 09/14/22 1524             PHYSICAL EXAMINATION:  Vitals:    09/14/22 1526   BP:    Pulse: 90   Resp:    Temp:      Body mass index is 22.57 kg/m².    Physical Exam  Constitutional: Well developed, well nourished, NAD.  ENT: External ears no masses with nose patent; normal hearing.  Neck: Supple; trachea midline.  Cardiovascular: Normal rate and regular rhythm.  Normal LVAD hum.   Lungs: Normal effort; lungs anterior bilaterally clear to auscultation.  Abdomen: Soft, no masses, no hernias.  MS: No clubbing or cyanosis of nails noted; unable to assess gait.  Skin: No rashes, lesions, or ulcers; no nodules.   Psychiatric: Good judgement and insight; normal mood and affect.  Neurological: Cranial nerves are grossly intact.   Foot: Nails in good condition, no amputations noted.        Labs Reviewed and Include   Recent Labs   Lab 09/14/22  0821      CALCIUM 9.7   ALBUMIN 2.9*   PROT 10.2*      K 3.8   CO2 25      BUN 12   CREATININE 0.9   ALKPHOS 135   ALT 13   AST 19   BILITOT 1.5*     Lab Results   Component Value Date    WBC 15.93 (H) 09/14/2022    HGB 6.8 (L) 09/14/2022    HCT 22.1 (L) 09/14/2022    MCV 90 09/14/2022     09/14/2022     No results for input(s): TSH, FREET4 in the last 168 hours.  Lab Results   Component Value Date    HGBA1C 7.7 (H) 01/28/2022       Nutritional status:   Body mass index is 22.57 kg/m².  Lab Results   Component Value Date    ALBUMIN 2.9 (L) 09/14/2022    ALBUMIN 3.1 (L) 08/11/2022    ALBUMIN 3.4 (L) 07/13/2022     Lab Results   Component Value  Date    PREALBUMIN 8 (L) 09/14/2022    PREALBUMIN 10 (L) 08/11/2022    PREALBUMIN 13 (L) 07/13/2022       Estimated Creatinine Clearance: 87.4 mL/min (based on SCr of 0.9 mg/dL).    Accu-Checks  No results for input(s): POCTGLUCOSE in the last 72 hours.     ASSESSMENT and PLAN    * LVAD (left ventricular assist device) present  Managed per primary team  Optimize BG control        Type 2 diabetes mellitus without complication  BG goal 140-180    Continue Low Dose Correction Scale  BG monitoring ac/hs  Can consider restarting home Januvia if prandial BG excursions noted    ** Please call Endocrine for any BG related issues **    Discharge plans: TBD    Lab Results   Component Value Date    HGBA1C 7.7 (H) 01/28/2022               Plan discussed with patient at bedside.     Marcia Montes NP  Endocrinology  [facility]

## 2022-09-14 NOTE — SUBJECTIVE & OBJECTIVE
Interval HPI:   Overnight events: Admitted to CSU. BG below goal ranges (109) this morning. Diet Cardiac  Eatin%  Nausea: No  Hypoglycemia and intervention: No  Fever: No  TPN and/or TF: No  If yes, type of TF/TPN and rate: n/a    PMH, PSH, FH, SH reviewed     ROS:  Constitutional: Negative for weight changes.  Eyes: Negative for visual disturbance.  Respiratory: Negative for cough.   Cardiovascular: Negative for chest pain.  Gastrointestinal: Negative for nausea.  Endocrine: Negative for polyuria, polydipsia.  Musculoskeletal: Negative for back pain.  Skin: Negative for rash.  Neurological: Negative for syncope.  Psychiatric/Behavioral: Negative for depression.    Review of Systems    Current Medications and/or Treatments Impacting Glycemic Control  Immunotherapy:    Immunosuppressants       None          Steroids:   Hormones (From admission, onward)      None          Pressors:    Autonomic Drugs (From admission, onward)      None          Hyperglycemia/Diabetes Medications:   Antihyperglycemics (From admission, onward)      Start     Stop Route Frequency Ordered    22 1622  insulin aspart U-100 pen 0-5 Units         -- SubQ Before meals & nightly PRN 22 1524             PHYSICAL EXAMINATION:  Vitals:    22 1526   BP:    Pulse: 90   Resp:    Temp:      Body mass index is 22.57 kg/m².    Physical Exam  Constitutional: Well developed, well nourished, NAD.  ENT: External ears no masses with nose patent; normal hearing.  Neck: Supple; trachea midline.  Cardiovascular: Normal rate and regular rhythm.  Normal LVAD hum.   Lungs: Normal effort; lungs anterior bilaterally clear to auscultation.  Abdomen: Soft, no masses, no hernias.  MS: No clubbing or cyanosis of nails noted; unable to assess gait.  Skin: No rashes, lesions, or ulcers; no nodules.   Psychiatric: Good judgement and insight; normal mood and affect.  Neurological: Cranial nerves are grossly intact.   Foot: Nails in good condition, no  amputations noted.

## 2022-09-14 NOTE — LETTER
September 14, 2022        Teresa Mendoza  [address]  [address]  [place] [facility] [address]  Phone: [phone]  Fax: [phone]             [id number]  [address]  [place] [address]  Phone: [phone]   Patient: Rocco France   MR Number: [id number]   YOB: 1955   Date of Visit: 9/14/2022       Dear Dr. Teresa Mendoza    Thank you for referring Rocco France to me for evaluation. Attached you will find relevant portions of my assessment and plan of care.    If you have questions, please do not hesitate to call me. I look forward to following Rocco France along with you.    Sincerely,    Pete Baker MD    Enclosure    If you would like to receive this communication electronically, please contact [email or web address] or [phone] to request [employer] access.    [employer] is a tool which provides read-only access to select patient information with whom you have a relationship. Its easy to use and provides real time access to review your patients record including encounter summaries, notes, results, and demographic information.    If you feel you have received this communication in error or would no longer like to receive these types of communications, please e-mail [email or web address]

## 2022-09-15 PROBLEM — D64.9 ANEMIA: Status: ACTIVE | Noted: 2022-09-15

## 2022-09-15 LAB
ANION GAP SERPL CALC-SCNC: 9 MMOL/L (ref 8–16)
APTT BLDCRRT: 43.3 SEC (ref 21–32)
BASOPHILS # BLD AUTO: 0.03 K/UL (ref 0–0.2)
BASOPHILS # BLD AUTO: 0.03 K/UL (ref 0–0.2)
BASOPHILS NFR BLD: 0.2 % (ref 0–1.9)
BASOPHILS NFR BLD: 0.3 % (ref 0–1.9)
BUN SERPL-MCNC: 15 MG/DL (ref 8–23)
CALCIUM SERPL-MCNC: 8.8 MG/DL (ref 8.7–10.5)
CHLORIDE SERPL-SCNC: 108 MMOL/L (ref 95–110)
CO2 SERPL-SCNC: 22 MMOL/L (ref 23–29)
CREAT SERPL-MCNC: 0.7 MG/DL (ref 0.5–1.4)
DIFFERENTIAL METHOD: ABNORMAL
DIFFERENTIAL METHOD: ABNORMAL
EOSINOPHIL # BLD AUTO: 0.3 K/UL (ref 0–0.5)
EOSINOPHIL # BLD AUTO: 0.3 K/UL (ref 0–0.5)
EOSINOPHIL NFR BLD: 2.4 % (ref 0–8)
EOSINOPHIL NFR BLD: 2.5 % (ref 0–8)
ERYTHROCYTE [DISTWIDTH] IN BLOOD BY AUTOMATED COUNT: 15.3 % (ref 11.5–14.5)
ERYTHROCYTE [DISTWIDTH] IN BLOOD BY AUTOMATED COUNT: 15.4 % (ref 11.5–14.5)
EST. GFR  (NO RACE VARIABLE): >60 ML/MIN/1.73 M^2
GLUCOSE SERPL-MCNC: 101 MG/DL (ref 70–110)
HCT VFR BLD AUTO: 25.2 % (ref 40–54)
HCT VFR BLD AUTO: 27.2 % (ref 40–54)
HGB BLD-MCNC: 8.1 G/DL (ref 14–18)
HGB BLD-MCNC: 8.8 G/DL (ref 14–18)
IMM GRANULOCYTES # BLD AUTO: 0.05 K/UL (ref 0–0.04)
IMM GRANULOCYTES # BLD AUTO: 0.06 K/UL (ref 0–0.04)
IMM GRANULOCYTES NFR BLD AUTO: 0.4 % (ref 0–0.5)
IMM GRANULOCYTES NFR BLD AUTO: 0.5 % (ref 0–0.5)
INR PPP: 5.4 (ref 0.8–1.2)
LDH SERPL L TO P-CCNC: 169 U/L (ref 110–260)
LYMPHOCYTES # BLD AUTO: 1.6 K/UL (ref 1–4.8)
LYMPHOCYTES # BLD AUTO: 1.7 K/UL (ref 1–4.8)
LYMPHOCYTES NFR BLD: 13 % (ref 18–48)
LYMPHOCYTES NFR BLD: 14.3 % (ref 18–48)
MAGNESIUM SERPL-MCNC: 1.8 MG/DL (ref 1.6–2.6)
MCH RBC QN AUTO: 28.1 PG (ref 27–31)
MCH RBC QN AUTO: 28.4 PG (ref 27–31)
MCHC RBC AUTO-ENTMCNC: 32.1 G/DL (ref 32–36)
MCHC RBC AUTO-ENTMCNC: 32.4 G/DL (ref 32–36)
MCV RBC AUTO: 88 FL (ref 82–98)
MCV RBC AUTO: 88 FL (ref 82–98)
MONOCYTES # BLD AUTO: 1 K/UL (ref 0.3–1)
MONOCYTES # BLD AUTO: 1.1 K/UL (ref 0.3–1)
MONOCYTES NFR BLD: 8.7 % (ref 4–15)
MONOCYTES NFR BLD: 8.9 % (ref 4–15)
NEUTROPHILS # BLD AUTO: 8.4 K/UL (ref 1.8–7.7)
NEUTROPHILS # BLD AUTO: 9.7 K/UL (ref 1.8–7.7)
NEUTROPHILS NFR BLD: 73.8 % (ref 38–73)
NEUTROPHILS NFR BLD: 75 % (ref 38–73)
NRBC BLD-RTO: 0 /100 WBC
NRBC BLD-RTO: 0 /100 WBC
PLATELET # BLD AUTO: 257 K/UL (ref 150–450)
PLATELET # BLD AUTO: 291 K/UL (ref 150–450)
PMV BLD AUTO: 10 FL (ref 9.2–12.9)
PMV BLD AUTO: 10.3 FL (ref 9.2–12.9)
POCT GLUCOSE: 105 MG/DL (ref 70–110)
POCT GLUCOSE: 137 MG/DL (ref 70–110)
POCT GLUCOSE: 158 MG/DL (ref 70–110)
POTASSIUM SERPL-SCNC: 4.1 MMOL/L (ref 3.5–5.1)
PROTHROMBIN TIME: 51.4 SEC (ref 9–12.5)
RBC # BLD AUTO: 2.88 M/UL (ref 4.6–6.2)
RBC # BLD AUTO: 3.1 M/UL (ref 4.6–6.2)
SODIUM SERPL-SCNC: 139 MMOL/L (ref 136–145)
WBC # BLD AUTO: 11.43 K/UL (ref 3.9–12.7)
WBC # BLD AUTO: 12.88 K/UL (ref 3.9–12.7)

## 2022-09-15 PROCEDURE — 36415 COLL VENOUS BLD VENIPUNCTURE: CPT | Performed by: INTERNAL MEDICINE

## 2022-09-15 PROCEDURE — 80048 BASIC METABOLIC PNL TOTAL CA: CPT | Performed by: PHYSICIAN ASSISTANT

## 2022-09-15 PROCEDURE — 85730 THROMBOPLASTIN TIME PARTIAL: CPT | Performed by: PHYSICIAN ASSISTANT

## 2022-09-15 PROCEDURE — 25000003 PHARM REV CODE 250: Performed by: PHYSICIAN ASSISTANT

## 2022-09-15 PROCEDURE — 83615 LACTATE (LD) (LDH) ENZYME: CPT | Performed by: PHYSICIAN ASSISTANT

## 2022-09-15 PROCEDURE — 83735 ASSAY OF MAGNESIUM: CPT | Performed by: PHYSICIAN ASSISTANT

## 2022-09-15 PROCEDURE — 99233 PR SUBSEQUENT HOSPITAL CARE,LEVL III: ICD-10-PCS | Mod: 95,,, | Performed by: PHYSICIAN ASSISTANT

## 2022-09-15 PROCEDURE — 99233 SBSQ HOSP IP/OBS HIGH 50: CPT | Mod: 95,,, | Performed by: PHYSICIAN ASSISTANT

## 2022-09-15 PROCEDURE — 99222 1ST HOSP IP/OBS MODERATE 55: CPT | Mod: GC,,, | Performed by: INTERNAL MEDICINE

## 2022-09-15 PROCEDURE — 93750 INTERROGATION VAD IN PERSON: CPT | Mod: ,,, | Performed by: INTERNAL MEDICINE

## 2022-09-15 PROCEDURE — 99222 PR INITIAL HOSPITAL CARE,LEVL II: ICD-10-PCS | Mod: GC,,, | Performed by: INTERNAL MEDICINE

## 2022-09-15 PROCEDURE — 85610 PROTHROMBIN TIME: CPT | Performed by: PHYSICIAN ASSISTANT

## 2022-09-15 PROCEDURE — 85025 COMPLETE CBC W/AUTO DIFF WBC: CPT | Mod: 91 | Performed by: INTERNAL MEDICINE

## 2022-09-15 PROCEDURE — 20600001 HC STEP DOWN PRIVATE ROOM

## 2022-09-15 PROCEDURE — 94761 N-INVAS EAR/PLS OXIMETRY MLT: CPT

## 2022-09-15 PROCEDURE — 85025 COMPLETE CBC W/AUTO DIFF WBC: CPT | Performed by: PHYSICIAN ASSISTANT

## 2022-09-15 PROCEDURE — 93750 PR INTERROGATE VENT ASSIST DEV, IN PERSON, W PHYSICIAN ANALYSIS: ICD-10-PCS | Mod: ,,, | Performed by: INTERNAL MEDICINE

## 2022-09-15 PROCEDURE — 27000248 HC VAD-ADDITIONAL DAY

## 2022-09-15 PROCEDURE — 36415 COLL VENOUS BLD VENIPUNCTURE: CPT | Performed by: PHYSICIAN ASSISTANT

## 2022-09-15 RX ORDER — FERROUS GLUCONATE 324(37.5)
324 TABLET ORAL
Status: DISCONTINUED | OUTPATIENT
Start: 2022-09-16 | End: 2022-09-16 | Stop reason: HOSPADM

## 2022-09-15 RX ADMIN — Medication 400 MG: at 09:09

## 2022-09-15 RX ADMIN — SPIRONOLACTONE 25 MG: 25 TABLET, FILM COATED ORAL at 08:09

## 2022-09-15 RX ADMIN — Medication 400 MG: at 08:09

## 2022-09-15 RX ADMIN — SENNOSIDES AND DOCUSATE SODIUM 1 TABLET: 50; 8.6 TABLET ORAL at 08:09

## 2022-09-15 RX ADMIN — ACETAMINOPHEN 1000 MG: 500 TABLET ORAL at 01:09

## 2022-09-15 RX ADMIN — LISINOPRIL 5 MG: 5 TABLET ORAL at 08:09

## 2022-09-15 RX ADMIN — DIGOXIN 0.12 MG: 125 TABLET ORAL at 08:09

## 2022-09-15 RX ADMIN — MIRTAZAPINE 15 MG: 15 TABLET, FILM COATED ORAL at 09:09

## 2022-09-15 RX ADMIN — ATORVASTATIN CALCIUM 80 MG: 20 TABLET, FILM COATED ORAL at 08:09

## 2022-09-15 RX ADMIN — POLYETHYLENE GLYCOL 3350 17 G: 17 POWDER, FOR SOLUTION ORAL at 08:09

## 2022-09-15 RX ADMIN — AMLODIPINE BESYLATE 5 MG: 5 TABLET ORAL at 08:09

## 2022-09-15 RX ADMIN — FUROSEMIDE 40 MG: 40 TABLET ORAL at 06:09

## 2022-09-15 RX ADMIN — ACETAMINOPHEN 1000 MG: 500 TABLET ORAL at 09:09

## 2022-09-15 RX ADMIN — Medication 400 MG: at 02:09

## 2022-09-15 NOTE — CONSULTS
[facility]  Gastroenterology  Consult Note    Patient Name: Rocco France  MRN: [id number]  Admission Date: 9/14/2022  Hospital Length of Stay: 1 days  Code Status: Full Code   Attending Provider: Manuel Ramos Jr.,*   Consulting Provider: Kuldip Silveira MD  Primary Care Physician: Jay Guardado DO  Principal Problem:LVAD (left ventricular assist device) present    Inpatient consult to Gastroenterology  Consult performed by: Kuldip Silveira MD  Consult ordered by: Valentina Jose PA-C  Reason for consult: Acute Anemia (6.8)  Assessment/Recommendations: Rule out GI Bleed with EGD & Colonoscopy.        Subjective:     HPI:  Mr France is a 68yo gentleman with stage D CHF (NICM) s/p LVAD placement in 1/2021, P/Afib, HTN, HLD, DM2 who was referred to Inpatient Hospital Medicine for an acute drop in Hgb (9.8-->6.8) over one month duration. Pt had previously been constipated but reported a large brown, firm BM on 9/13/2021 that required more straining than normal. No recent change in BM habits. He did acknowledge having dark diarrhea after eating at [employer] and [employer] over a month ago, but his daughter experienced similar symptoms and the episodes were attributed to food born cause. No noted fever/chills, HA, congestion, CP, palpitations, SOB, AP, or N/V. In ED, elevated lab findings include CRP (128), PT/INR (38-->51) / (4--> 5.4). One unit of pRBCs was transfused (6.8-->8.1).  GI consulted for for workup of anemia and GIB rule out.       Past Medical History:   Diagnosis Date    Acute respiratory failure requiring reintubation 1/28/2022    CLARISSE (acute kidney injury) 1/11/2021    Diabetes mellitus     Kidney stones        Past Surgical History:   Procedure Laterality Date    CARDIAC CATHETERIZATION  2010    no stents    CARDIAC DEFIBRILLATOR PLACEMENT  2010    CARDIAC DEFIBRILLATOR PLACEMENT      HERNIA REPAIR      IRRIGATION OF MEDIASTINUM N/A 1/14/2021    Procedure: IRRIGATION,  MEDIASTINUM;  Surgeon: Zenon Corona MD;  [address]  Service: Cardiovascular;  Laterality: N/A;    KNEE ARTHROSCOPY Right     LEFT VENTRICULAR ASSIST DEVICE Left 1/13/2021    Procedure: INSERTION- HeartMate 3 LEFT VENTRICULAR ASSIST DEVICE ;  Surgeon: Zenon Corona MD;  [facility];  Service: Cardiovascular;  Laterality: Left;    RECONSTRUCTION OF PERICARDIUM N/A 1/14/2021    Procedure: RECONSTRUCTION, PERICARDIUM;  Surgeon: Zenon Corona MD;  [facility];  Service: Cardiovascular;  Laterality: N/A;    RIGHT HEART CATHETERIZATION Right 11/2/2020    Procedure: INSERTION, CATHETER, RIGHT HEART;  Surgeon: Deana Lake MD;  Location: [facility] CATH LAB;  Service: Cardiology;  Laterality: Right;    RIGHT HEART CATHETERIZATION Right 3/24/2022    Procedure: INSERTION, CATHETER, RIGHT HEART;  Surgeon: Manuel Ramos Jr., MD;  Location: [facility] CATH LAB;  Service: Cardiology;  Laterality: Right;    STERNAL WOUND CLOSURE  1/13/2021    Procedure: TEMPORARY CLOSURE OF CHEST;  Surgeon: Zenon Corona MD;  [address];  Service: Cardiovascular;;    STERNAL WOUND CLOSURE N/A 1/14/2021    Procedure: CLOSURE, WOUND, STERNUM;  Surgeon: Zenon Corona MD;  [address];  Service: Cardiovascular;  Laterality: N/A;       Review of patient's allergies indicates:   Allergen Reactions    Pcn [penicillins] Hives     Family History       Problem Relation (Age of Onset)    Heart attack Mother, Brother    Heart failure Brother    Hypertension Brother          Tobacco Use    Smoking status: Some Days     Types: Cigarettes    Smokeless tobacco: Never   Substance and Sexual Activity    Alcohol use: No    Drug use: Not on file    Sexual activity: Not on file     Review of Systems   Constitutional:  Negative for chills and fever.   HENT:  Negative for rhinorrhea and sneezing.    Eyes:  Negative for redness and visual disturbance.   Respiratory:  Negative for  cough, chest tightness and shortness of breath.    Cardiovascular:  Negative for chest pain, palpitations and leg swelling.   Gastrointestinal:  Positive for diarrhea. Negative for abdominal distention, abdominal pain, nausea and vomiting.   Genitourinary:  Negative for difficulty urinating.   Musculoskeletal:  Negative for back pain, joint swelling and myalgias.   Skin:  Negative for pallor and rash.   Neurological:  Negative for dizziness, speech difficulty, weakness, light-headedness and headaches.   Hematological:  Negative for adenopathy.   Psychiatric/Behavioral:  Negative for confusion and decreased concentration.    Objective:     Vital Signs (Most Recent):  Temp: 98.7 °F (37.1 °C) (09/15/22 1150)  Pulse: 82 (09/15/22 1150)  Resp: 18 (09/15/22 1150)  BP: 99/71 (09/15/22 1155)  SpO2: 98 % (09/15/22 1150) Vital Signs (24h Range):  Temp:  [97.4 °F (36.3 °C)-99.6 °F (37.6 °C)] 98.7 °F (37.1 °C)  Pulse:  [] 82  Resp:  [17-20] 18  SpO2:  [96 %-100 %] 98 %  BP: ()/(0-87) 99/71     Weight: 76.9 kg (169 lb 8.5 oz) (09/15/22 0750)  Body mass index is 22.37 kg/m².      Intake/Output Summary (Last 24 hours) at 9/15/2022 1221  Last data filed at 9/15/2022 0900  Gross per 24 hour   Intake 600 ml   Output 1525 ml   Net -925 ml       Lines/Drains/Airways       Line       Name Duration         VAD 01/13/21 1211 Left ventricular assist device HeartMate 3 609 days              Peripheral Intravenous Line       Name Duration         Peripheral IV - Single Lumen 09/14/22 2250 22 G Anterior;Left Forearm <1 day                    Physical Exam  Vitals and nursing note reviewed.   Constitutional:       Appearance: Normal appearance.   HENT:      Right Ear: External ear normal.      Left Ear: External ear normal.      Nose: Nose normal. No congestion.      Mouth/Throat:      Mouth: Mucous membranes are moist.      Pharynx: Oropharynx is clear.   Eyes:      Extraocular Movements: Extraocular movements intact.      Pupils:  Pupils are equal, round, and reactive to light.   Cardiovascular:      Rate and Rhythm: Normal rate and regular rhythm.   Pulmonary:      Effort: Pulmonary effort is normal. No respiratory distress.      Breath sounds: Normal breath sounds. No wheezing or rales.   Abdominal:      General: Abdomen is flat. Bowel sounds are normal.      Palpations: Abdomen is soft.      Tenderness: There is no abdominal tenderness.      Comments: LAURA performed, negative for hematochezia and melena.  No hemorrhoids visible.   Musculoskeletal:         General: No swelling or tenderness.      Cervical back: Neck supple. No rigidity or tenderness.   Skin:     General: Skin is warm.      Capillary Refill: Capillary refill takes less than 2 seconds.      Coloration: Skin is not jaundiced or pale.   Neurological:      General: No focal deficit present.      Mental Status: He is alert and oriented to person, place, and time.   Psychiatric:         Mood and Affect: Mood normal.         Behavior: Behavior normal.         Thought Content: Thought content normal.         Judgment: Judgment normal.       Significant Labs:  CBC:   Recent Labs   Lab 09/14/22  0821 09/15/22  1030   WBC 15.93* 11.43   HGB 6.8* 8.1*   HCT 22.1* 25.2*    257     Coagulation:   Recent Labs   Lab 09/15/22  0430   INR 5.4*   APTT 43.3*         Assessment/Plan:     Anemia  68yo gentleman presenting with acute asymptomatic anemia with decreased energy and appetite. Iron studies concerning for anemia of chronic disease with decreased Fe inventory (25) and elevated T.Bili (1.5).  LEs wnl. Gamma Gap elevated at 6.3 in setting of leukocytosis and acute anemia. Pt is an elderly male with >8yrs from prior colon cancer screen. Will perform EGD and colonoscopy to rule out GI bleed.   Recommendations:  -Place patient on liquid diet 9/16-9/17 and advance to clear liquids on 9/18. NPO on 9/19/22.   -Bisacoydal 10mg PO once on  9/18/22  -Place 2 large bore IVs, volume  resuscitation per primary  -Transfuse pRBC for Hb < 7 g/dL (Consider a higher Hb target if there is clinical evidence of intravascular volume depletion or comorbidities, such as CAD or if high suspicion of vigorous active ongoing bleeding or an uncorrected coagulopathy exists.).  -Correct coagulopathy (goal plt >50, INR <1.5) if present in patients without absolute contraindications.  -PPI 80 mg IV bolus once, then 8 mg/hour infusion or IV PPI 40 BID  -Avoid NSAIDs, antiplatelet agents and anticoagulants if possible in patients without absolute contraindications  -Will plan for EGD and colonoscopy on 9/19/22.  -Fellow will order Go-Lytely bowel prep (4L) given over 3-4 hours before the start of the procedure.  -Please contact GI Consults for any acute change in status.        Thank you for your consult. I will follow-up with patient. Please contact us if you have any additional questions.    Kuldip Silveira MD  Gastroenterology  [facility]

## 2022-09-15 NOTE — CARE UPDATE
BG goal 140 - 180   Diet Cardiac       BG stable without current use of insulin therapy. Patient eating full meals. Endocrinology will continue to follow, and manage glycemic control while inpatient.     - continue Low Dose SQ Insulin Correction Scale PRN hyperglycemia.   - BG Monitoring AC/HS     ** Please call Endocrine for any BG related issues **    ** Please notify Endocrine for any change and/or advance in diet**    Lab Results   Component Value Date    HGBA1C 6.5 (H) 09/14/2022       Discharge Planning:   - PRN refill of Insurance preferred diabetes testing supplies  - continue Januvia 50 mg PO daily.   - Patient is to monitor blood sugar AcHs, and follow-up with PCP for continued DM management and care.

## 2022-09-15 NOTE — PROGRESS NOTES
[facility] - Cardiology Stepdown  Heart Transplant  Progress Note    Patient Name: Rocco France  MRN: [id number]  Admission Date: 9/14/2022  Hospital Length of Stay: 1 days  Attending Physician: Manuel Ramos Jr.,*  Primary Care Provider: Jay Guardado DO  Principal Problem:LVAD (left ventricular assist device) present    Subjective:     Interval History: Admitted yesterday from clinic with concerns of GIB. CBC this AM 8.1, repeated 8.8. Patient not transfused any blood products. Suspect initial CBC lab error. Patient without symptoms of bleeding or infection. Bcxs pending, NGTD thus far. UA negative. CT C/A/P with no findings to explain acute anemia and no CT evidence to suggest infectious process. INR uptrended further to 5.4 with holding coumadin. Pt reports no change in appetite. Iron deficient on iron studies however does not qualify for IV iron per [facility] policy. (Ferritin > 300). Review of echo yesterday with LVEDD 6.65cm with speed at 5100 so speed was increased to 5200 upon admission.     Continuous Infusions:  Scheduled Meds:   amLODIPine  5 mg Oral Daily    atorvastatin  80 mg Oral Daily    digoxin  0.125 mg Oral Daily    furosemide  40 mg Oral Daily    lisinopriL  5 mg Oral Daily    magnesium oxide  400 mg Oral TID    mirtazapine  15 mg Oral QHS    polyethylene glycol  17 g Oral Daily    senna-docusate 8.6-50 mg  1 tablet Oral Daily    spironolactone  25 mg Oral Daily     PRN Meds:acetaminophen, dextrose 10%, dextrose 10%, glucagon (human recombinant), glucose, glucose, insulin aspart U-100    Review of patient's allergies indicates:   Allergen Reactions    Pcn [penicillins] Hives     Objective:     Vital Signs (Most Recent):  Temp: 98.7 °F (37.1 °C) (09/15/22 1150)  Pulse: 82 (09/15/22 1150)  Resp: 18 (09/15/22 1150)  BP: 99/71 (09/15/22 1155)  SpO2: 98 % (09/15/22 1150) Vital Signs (24h Range):  Temp:  [97.4 °F (36.3 °C)-99.6 °F (37.6 °C)] 98.7 °F (37.1 °C)  Pulse:  [78-99] 82  Resp:   [17-20] 18  SpO2:  [96 %-100 %] 98 %  BP: ()/(0-87) 99/71     Patient Vitals for the past 72 hrs (Last 3 readings):   Weight   09/15/22 0750 76.9 kg (169 lb 8.5 oz)   09/14/22 1330 77.6 kg (171 lb 1.2 oz)     Body mass index is 22.37 kg/m².      Intake/Output Summary (Last 24 hours) at 9/15/2022 1627  Last data filed at 9/15/2022 1400  Gross per 24 hour   Intake 702 ml   Output 2275 ml   Net -1573 ml       Hemodynamic Parameters:       Physical Exam  Vitals and nursing note reviewed.   Constitutional:       Appearance: Normal appearance. He is not ill-appearing.   HENT:      Head: Normocephalic and atraumatic.      Right Ear: External ear normal.      Left Ear: External ear normal.      Nose: Nose normal.   Eyes:      Extraocular Movements: Extraocular movements intact.      Conjunctiva/sclera: Conjunctivae normal.   Cardiovascular:      Rate and Rhythm: Normal rate and regular rhythm.      Comments: VAD hum  Pulmonary:      Effort: Pulmonary effort is normal.      Breath sounds: Normal breath sounds.   Abdominal:      General: Bowel sounds are normal.      Palpations: Abdomen is soft.   Musculoskeletal:         General: No swelling. Normal range of motion.      Cervical back: Normal range of motion and neck supple.      Right lower leg: No edema.      Left lower leg: No edema.   Skin:     General: Skin is warm and dry.   Neurological:      General: No focal deficit present.      Mental Status: He is alert and oriented to person, place, and time.   Psychiatric:         Mood and Affect: Mood normal.         Behavior: Behavior normal.       Significant Labs:  CBC:  Recent Labs   Lab 09/14/22  0821 09/15/22  1030 09/15/22  1457   WBC 15.93* 11.43 12.88*   RBC 2.47* 2.88* 3.10*   HGB 6.8* 8.1* 8.8*   HCT 22.1* 25.2* 27.2*    257 291   MCV 90 88 88   MCH 27.5 28.1 28.4   MCHC 30.8* 32.1 32.4     BNP:  Recent Labs   Lab 09/14/22  0821   *     CMP:  Recent Labs   Lab 09/14/22  0821 09/15/22  0430   GLU  109 101   CALCIUM 9.7 8.8   ALBUMIN 2.9*  --    PROT 10.2*  --     139   K 3.8 4.1   CO2 25 22*    108   BUN 12 15   CREATININE 0.9 0.7   ALKPHOS 135  --    ALT 13  --    AST 19  --    BILITOT 1.5*  --       Coagulation:   Recent Labs   Lab 09/14/22  0821 09/14/22  1543 09/15/22  0430   INR 4.3* 4.0* 5.4*   APTT  --   --  43.3*     LDH:  Recent Labs   Lab 09/14/22  0821 09/15/22  0430    169     Microbiology:  Microbiology Results (last 7 days)       Procedure Component Value Units Date/Time    Blood culture [[id number]] Collected: 09/14/22 1543    Order Status: Completed Specimen: Blood from Antecubital, Left Updated: 09/15/22 0118     Blood Culture, Routine No Growth to date    Blood culture [[id number]] Collected: 09/14/22 1552    Order Status: Completed Specimen: Blood from Antecubital, Right Updated: 09/15/22 0117     Blood Culture, Routine No Growth to date            I have reviewed all pertinent labs within the past 24 hours.    Estimated Creatinine Clearance: 111.4 mL/min (based on SCr of 0.7 mg/dL).    Diagnostic Results:  I have reviewed all pertinent imaging results/findings within the past 24 hours.    Assessment and Plan:     No notes on file    * LVAD (left ventricular assist device) present  -s/p DT HM3 1/13/2021  -Speed set at 5100rpms on admit. Echo reviewed with LVEDD 6.65cm, speed increased to 5200rpms 9/14  -INR supratherapeutic. Hold coumadin  -LDH stable. Continue to monitor daily.    Procedure: Device Interrogation Including analysis of device parameters  Current Settings: Ventricular Assist Device  Review of device function is  stable    TXP LVAD INTERROGATIONS 9/15/2022 9/15/2022 9/15/2022 9/15/2022 9/15/2022 9/14/2022 9/14/2022   Type HeartMate3 HeartMate3 HeartMate3 HeartMate3 HeartMate3 HeartMate3 HeartMate3   Flow 4.7 - 4.3 4.6 4.5 4.5 4.7   Speed 5200 - 5200 5200 5200 5200 5200   PI 5.1 - 5.5 3.6 4.2 5.2 5.0   Power (Edwards) 3.7 - 3.6 3.6 3.6 3.7 3.8   [facility] 4800 - 4800  4800 4800 4800 4800   Pulsatility Pulse - Pulse - - Pulse Pulse       Anemia  -Hbg 6.8 at clinic visit, admitted with concerns of GIB as drop from BL of around 10  -No signs/symptoms of bleeding. CBC this AM with hbg 8.1, repeated to assess accuracy 8.8. Suspect lab error on 9/14 CBC  -GI consulted for w/u on admit  -CT C/A/P with no findings to explain acute anemia      CT [place]  Heart Transplant  [facility] - Cardiology Stepdown

## 2022-09-15 NOTE — PLAN OF CARE
Pt maintained free from falls/trauma/injuries and skin breakdown. Pt c/o severe pain at both knees and PRN tylenol given and helped. BG monitored and no coverage needed. No alarm noted overnight. Plan of care reviewed. Pt verbalized understanding. All questions and concerns addressed. Will continue to monitor.
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
## 2022-09-15 NOTE — SUBJECTIVE & OBJECTIVE
Past Medical History:   Diagnosis Date    Acute respiratory failure requiring reintubation 1/28/2022    CLARISSE (acute kidney injury) 1/11/2021    Diabetes mellitus     Kidney stones        Past Surgical History:   Procedure Laterality Date    CARDIAC CATHETERIZATION  2010    no stents    CARDIAC DEFIBRILLATOR PLACEMENT  2010    CARDIAC DEFIBRILLATOR PLACEMENT      HERNIA REPAIR      IRRIGATION OF MEDIASTINUM N/A 1/14/2021    Procedure: IRRIGATION, MEDIASTINUM;  Surgeon: Zenon Corona MD;  [facility];  Service: Cardiovascular;  Laterality: N/A;    KNEE ARTHROSCOPY Right     LEFT VENTRICULAR ASSIST DEVICE Left 1/13/2021    Procedure: INSERTION- HeartMate 3 LEFT VENTRICULAR ASSIST DEVICE ;  Surgeon: Zenon Corona MD;  [facility];  Service: Cardiovascular;  Laterality: Left;    RECONSTRUCTION OF PERICARDIUM N/A 1/14/2021    Procedure: RECONSTRUCTION, PERICARDIUM;  Surgeon: Zenon Corona MD;  [facility];  Service: Cardiovascular;  Laterality: N/A;    RIGHT HEART CATHETERIZATION Right 11/2/2020    Procedure: INSERTION, CATHETER, RIGHT HEART;  Surgeon: Deana Lake MD;  Location: [facility] CATH LAB;  Service: Cardiology;  Laterality: Right;    RIGHT HEART CATHETERIZATION Right 3/24/2022    Procedure: INSERTION, CATHETER, RIGHT HEART;  Surgeon: Manuel Ramos Jr., MD;  Location: [facility] CATH LAB;  Service: Cardiology;  Laterality: Right;    STERNAL WOUND CLOSURE  1/13/2021    Procedure: TEMPORARY CLOSURE OF CHEST;  Surgeon: Zenon Corona MD;  [facility];  Service: Cardiovascular;;    STERNAL WOUND CLOSURE N/A 1/14/2021    Procedure: CLOSURE, WOUND, STERNUM;  Surgeon: Zenon Corona MD;  [facility];  Service: Cardiovascular;  Laterality: N/A;       Review of patient's allergies indicates:   Allergen Reactions    Pcn [penicillins] Hives     Family History       Problem Relation (Age of Onset)    Heart attack Mother, Brother    Heart failure Brother     Hypertension Brother          Tobacco Use    Smoking status: Some Days     Types: Cigarettes    Smokeless tobacco: Never   Substance and Sexual Activity    Alcohol use: No    Drug use: Not on file    Sexual activity: Not on file     Review of Systems   Constitutional:  Negative for chills and fever.   HENT:  Negative for rhinorrhea and sneezing.    Eyes:  Negative for redness and visual disturbance.   Respiratory:  Negative for cough, chest tightness and shortness of breath.    Cardiovascular:  Negative for chest pain, palpitations and leg swelling.   Gastrointestinal:  Positive for diarrhea. Negative for abdominal distention, abdominal pain, nausea and vomiting.   Genitourinary:  Negative for difficulty urinating.   Musculoskeletal:  Negative for back pain, joint swelling and myalgias.   Skin:  Negative for pallor and rash.   Neurological:  Negative for dizziness, speech difficulty, weakness, light-headedness and headaches.   Hematological:  Negative for adenopathy.   Psychiatric/Behavioral:  Negative for confusion and decreased concentration.    Objective:     Vital Signs (Most Recent):  Temp: 98.7 °F (37.1 °C) (09/15/22 1150)  Pulse: 82 (09/15/22 1150)  Resp: 18 (09/15/22 1150)  BP: 99/71 (09/15/22 1155)  SpO2: 98 % (09/15/22 1150) Vital Signs (24h Range):  Temp:  [97.4 °F (36.3 °C)-99.6 °F (37.6 °C)] 98.7 °F (37.1 °C)  Pulse:  [] 82  Resp:  [17-20] 18  SpO2:  [96 %-100 %] 98 %  BP: ()/(0-87) 99/71     Weight: 76.9 kg (169 lb 8.5 oz) (09/15/22 0750)  Body mass index is 22.37 kg/m².      Intake/Output Summary (Last 24 hours) at 9/15/2022 1221  Last data filed at 9/15/2022 0900  Gross per 24 hour   Intake 600 ml   Output 1525 ml   Net -925 ml       Lines/Drains/Airways       Line       Name Duration         VAD 01/13/21 1211 Left ventricular assist device HeartMate 3 609 days              Peripheral Intravenous Line       Name Duration         Peripheral IV - Single Lumen 09/14/22 2250 22 G  Anterior;Left Forearm <1 day                    Physical Exam  Vitals and nursing note reviewed.   Constitutional:       Appearance: Normal appearance.   HENT:      Right Ear: External ear normal.      Left Ear: External ear normal.      Nose: Nose normal. No congestion.      Mouth/Throat:      Mouth: Mucous membranes are moist.      Pharynx: Oropharynx is clear.   Eyes:      Extraocular Movements: Extraocular movements intact.      Pupils: Pupils are equal, round, and reactive to light.   Cardiovascular:      Rate and Rhythm: Normal rate and regular rhythm.   Pulmonary:      Effort: Pulmonary effort is normal. No respiratory distress.      Breath sounds: Normal breath sounds. No wheezing or rales.   Abdominal:      General: Abdomen is flat. Bowel sounds are normal.      Palpations: Abdomen is soft.      Tenderness: There is no abdominal tenderness.      Comments: LAURA performed, negative for hematochezia and melena.  No hemorrhoids visible.   Musculoskeletal:         General: No swelling or tenderness.      Cervical back: Neck supple. No rigidity or tenderness.   Skin:     General: Skin is warm.      Capillary Refill: Capillary refill takes less than 2 seconds.      Coloration: Skin is not jaundiced or pale.   Neurological:      General: No focal deficit present.      Mental Status: He is alert and oriented to person, place, and time.   Psychiatric:         Mood and Affect: Mood normal.         Behavior: Behavior normal.         Thought Content: Thought content normal.         Judgment: Judgment normal.       Significant Labs:  CBC:   Recent Labs   Lab 09/14/22  0821 09/15/22  1030   WBC 15.93* 11.43   HGB 6.8* 8.1*   HCT 22.1* 25.2*    257     Coagulation:   Recent Labs   Lab 09/15/22  0430   INR 5.4*   APTT 43.3*

## 2022-09-15 NOTE — PT/OT/SLP PROGRESS
Physical Therapy      Patient Name:  Rocco France   MRN:  [id number]    Orders d/c for PT per MD, states patient is at their baseline.

## 2022-09-15 NOTE — HPI
Mr France is a 68yo gentleman with stage D CHF (NICM) s/p LVAD placement in 1/2021, P/Afib, HTN, HLD, DM2 who was referred to Inpatient Hospital Medicine for an acute drop in Hgb (9.8-->6.8) over one month duration. Pt had previously been constipated but reported a large brown, firm BM on 9/13/2021 that required more straining than normal. No recent change in BM habits. He did acknowledge having dark diarrhea after eating at [employer] over a month ago, but his daughter experienced similar symptoms and the episodes were attributed to food born cause. No noted fever/chills, HA, congestion, CP, palpitations, SOB, AP, or N/V. In ED, elevated lab findings include CRP (128), PT/INR (38-->51) / (4--> 5.4). One unit of pRBCs was transfused (6.8-->8.1).  GI consulted for for workup of anemia and GIB rule out.

## 2022-09-15 NOTE — NURSING
Called [phone] and ask for result for CBC and  said lab sample collected was clotted, need reorder CBC. Notified CT Jose Stat CBC ordered.

## 2022-09-15 NOTE — PLAN OF CARE
VSS. BG monitored. LVAD number and doppler WNL. LVAD dressing changed per protocol (see note). Pt educated on fall risk and remained free from falls/trauma/injury. Denies chest pain, SOB, palpitations, dizziness, pain, or discomfort. Plan of care reviewed with pt, all questions answered. Bed locked in lowest position, call bell within reach, no acute distress noted, will continue to monitor.

## 2022-09-15 NOTE — SUBJECTIVE & OBJECTIVE
Interval History: Admitted yesterday from clinic with concerns of GIB. CBC this AM 8.1, repeated 8.8. Patient not transfused any blood products. Suspect initial CBC lab error. Patient without symptoms of bleeding or infection. Bcxs pending, NGTD thus far. UA negative. CT C/A/P with no findings to explain acute anemia and no CT evidence to suggest infectious process. INR uptrended further to 5.4 with holding coumadin. Pt reports no change in appetite. Iron deficient on iron studies however does not qualify for IV iron per [facility] policy. (Ferritin > 300). Review of echo yesterday with LVEDD 6.65cm with speed at 5100 so speed was increased to 5200 upon admission.     Continuous Infusions:  Scheduled Meds:   amLODIPine  5 mg Oral Daily    atorvastatin  80 mg Oral Daily    digoxin  0.125 mg Oral Daily    furosemide  40 mg Oral Daily    lisinopriL  5 mg Oral Daily    magnesium oxide  400 mg Oral TID    mirtazapine  15 mg Oral QHS    polyethylene glycol  17 g Oral Daily    senna-docusate 8.6-50 mg  1 tablet Oral Daily    spironolactone  25 mg Oral Daily     PRN Meds:acetaminophen, dextrose 10%, dextrose 10%, glucagon (human recombinant), glucose, glucose, insulin aspart U-100    Review of patient's allergies indicates:   Allergen Reactions    Pcn [penicillins] Hives     Objective:     Vital Signs (Most Recent):  Temp: 98.7 °F (37.1 °C) (09/15/22 1150)  Pulse: 82 (09/15/22 1150)  Resp: 18 (09/15/22 1150)  BP: 99/71 (09/15/22 1155)  SpO2: 98 % (09/15/22 1150) Vital Signs (24h Range):  Temp:  [97.4 °F (36.3 °C)-99.6 °F (37.6 °C)] 98.7 °F (37.1 °C)  Pulse:  [78-99] 82  Resp:  [17-20] 18  SpO2:  [96 %-100 %] 98 %  BP: ()/(0-87) 99/71     Patient Vitals for the past 72 hrs (Last 3 readings):   Weight   09/15/22 0750 76.9 kg (169 lb 8.5 oz)   09/14/22 1330 77.6 kg (171 lb 1.2 oz)     Body mass index is 22.37 kg/m².      Intake/Output Summary (Last 24 hours) at 9/15/2022 1627  Last data filed at 9/15/2022 1400  Gross per 24  hour   Intake 702 ml   Output 2275 ml   Net -1573 ml       Hemodynamic Parameters:       Physical Exam  Vitals and nursing note reviewed.   Constitutional:       Appearance: Normal appearance. He is not ill-appearing.   HENT:      Head: Normocephalic and atraumatic.      Right Ear: External ear normal.      Left Ear: External ear normal.      Nose: Nose normal.   Eyes:      Extraocular Movements: Extraocular movements intact.      Conjunctiva/sclera: Conjunctivae normal.   Cardiovascular:      Rate and Rhythm: Normal rate and regular rhythm.      Comments: VAD hum  Pulmonary:      Effort: Pulmonary effort is normal.      Breath sounds: Normal breath sounds.   Abdominal:      General: Bowel sounds are normal.      Palpations: Abdomen is soft.   Musculoskeletal:         General: No swelling. Normal range of motion.      Cervical back: Normal range of motion and neck supple.      Right lower leg: No edema.      Left lower leg: No edema.   Skin:     General: Skin is warm and dry.   Neurological:      General: No focal deficit present.      Mental Status: He is alert and oriented to person, place, and time.   Psychiatric:         Mood and Affect: Mood normal.         Behavior: Behavior normal.       Significant Labs:  CBC:  Recent Labs   Lab 09/14/22  0821 09/15/22  1030 09/15/22  1457   WBC 15.93* 11.43 12.88*   RBC 2.47* 2.88* 3.10*   HGB 6.8* 8.1* 8.8*   HCT 22.1* 25.2* 27.2*    257 291   MCV 90 88 88   MCH 27.5 28.1 28.4   MCHC 30.8* 32.1 32.4     BNP:  Recent Labs   Lab 09/14/22  0821   *     CMP:  Recent Labs   Lab 09/14/22  0821 09/15/22  0430    101   CALCIUM 9.7 8.8   ALBUMIN 2.9*  --    PROT 10.2*  --     139   K 3.8 4.1   CO2 25 22*    108   BUN 12 15   CREATININE 0.9 0.7   ALKPHOS 135  --    ALT 13  --    AST 19  --    BILITOT 1.5*  --       Coagulation:   Recent Labs   Lab 09/14/22  0821 09/14/22  1543 09/15/22  0430   INR 4.3* 4.0* 5.4*   APTT  --   --  43.3*      LDH:  Recent Labs   Lab 09/14/22  0821 09/15/22  0430    169     Microbiology:  Microbiology Results (last 7 days)       Procedure Component Value Units Date/Time    Blood culture [[id number]] Collected: 09/14/22 1543    Order Status: Completed Specimen: Blood from Antecubital, Left Updated: 09/15/22 0118     Blood Culture, Routine No Growth to date    Blood culture [[id number]] Collected: 09/14/22 1552    Order Status: Completed Specimen: Blood from Antecubital, Right Updated: 09/15/22 0117     Blood Culture, Routine No Growth to date            I have reviewed all pertinent labs within the past 24 hours.    Estimated Creatinine Clearance: 111.4 mL/min (based on SCr of 0.7 mg/dL).    Diagnostic Results:  I have reviewed all pertinent imaging results/findings within the past 24 hours.

## 2022-09-15 NOTE — ASSESSMENT & PLAN NOTE
68yo gentleman presenting with acute asymptomatic anemia with decreased energy and appetite. Iron studies concerning for anemia of chronic disease with decreased Fe inventory (25) and elevated T.Bili (1.5).  LEs wnl. Gamma Gap elevated at 6.3 in setting of leukocytosis and acute anemia. Pt is an elderly male with >8yrs from prior colon cancer screen. Will perform EGD and colonoscopy to rule out GI bleed.   Recommendations:  -Place patient on liquid diet 9/16-9/17 and advance to clear liquids on 9/18. NPO on 9/19/22.   -Bisacoydal 10mg PO once on  9/18/22  -Place 2 large bore IVs, volume resuscitation per primary  -Transfuse pRBC for Hb < 7 g/dL (Consider a higher Hb target if there is clinical evidence of intravascular volume depletion or comorbidities, such as CAD or if high suspicion of vigorous active ongoing bleeding or an uncorrected coagulopathy exists.).  -Correct coagulopathy (goal plt >50, INR <1.5) if present in patients without absolute contraindications.  -PPI 80 mg IV bolus once, then 8 mg/hour infusion or IV PPI 40 BID  -Avoid NSAIDs, antiplatelet agents and anticoagulants if possible in patients without absolute contraindications  -Will plan for EGD and colonoscopy on 9/19/22.  -Fellow will order Go-Lytely bowel prep (4L) given over 3-4 hours before the start of the procedure.  -Please contact GI Consults for any acute change in status.

## 2022-09-15 NOTE — PROGRESS NOTES
Admit Note     Met with pt to assess needs. Patient is a 67 y.o.  male, admitted for LVAD complication.  The pt does his own dressing changes.     Patient admitted to [facility] on 9/14/2022 .  At this time, patient presents as alert and oriented x4, good eye contact, calm, and communicative.      At this time, patients caregiver is not in attendance.    Household/Family Systems     Patient resides with patient's spouse, at     [address] [place] [address].  1 hour and 45 minutes from [facility]      Support system includes spouse, siblings, adult children, adult step son and nephew.    Pt does not have dependents that are in need of being cared for.      Patients primary caregiver is self with support from spouse when indicated.    Pt's cell:   [phone]    Emergency contacts:   Meme France (spouse, HCPA, drives at home) [phone]  Laura Barron (Daughter) [phone] (Lives in [place])  Desi Blanco (pt's sister, lives near pt) [phone]    During admission, patient's caregiver plans to stay at home.  Confirmed patient and patients caregivers do have access to reliable transportation.    Cognitive Status/Learning     Patient reports reading ability as 12th grade and states patient does not have difficulty with hearing or writing.   The pt reports difficulty with seeing  and wears glasses to read.  Pt reports short term memory issues, and he has to stay focused.  Pt reports additional time and support is needed to learn new information.  Pt is very spiritual.      Patient reports patient learns best by visual and one on one.      Needed: no.   Highest education level: 12th    Vocation/Disability   .  Working for Income: no, pt receives disability.  Patient used to be a , however due to heart issues the pt became disabled in 2007.   Pt's spouse is retired.     Adherence     Patient reports good level of adherence to patients health care regimen. The pt reports he is able to  keep up with his MD appointments and he follows his diet.     Adherence counseling and education provided. Patient verbalizes understanding.    Substance Use    Patient reports the following substance usage.    Tobacco: none, pt denies any use.  Alcohol: an occasional beer.  Illicit Drugs/Non-prescribed Medications: none, pt denies any use.  Patient states clear understanding of the potential impact of substance use.  Substance abstinence/cessation counseling, education and resources provided and reviewed.     Services Utilizing/ADLS    Infusion Service: Prior to admission, patient utilizing?   Home Health: Prior to admission, patient utilizing? Yes, Home Health 2000 [phone], fax [phone].  Pt reported skilled nurse goes over meds and takes vital signed.  Pt has labs done at hospital.  Pt would like to continue with HH when discharged and would like to use the same company.   DME: Prior to admission, yes, cane (pt tries not to use the cane often, worried about dependence), and shower chair.   Pulmonary/Cardiac Rehab: Prior to admission, no  Dialysis:  Prior to admission, no  Transplant Specialty Pharmacy:  Prior to admission, no    Prior to admission, patient reports patient was independent  with ADLS and was driving.  Patient reports patient is not able to care for himself at this time as documented in the medical record.  Patient indictes a willingness to care for self once medically cleared to do so.    Insurance/Medications    Insured by   Payer/Plan Subscr  Sex Relation Sub. Ins. ID Effective Group Num   1. CAMILA TRINH* ELANA ACEVEDO 1955 Male Self [id number] [address]      Primary Insurance (for UNOS reporting): Medicare FFS  Secondary Insurance (for UNOS reporting): none    NOTE:  Patient reports he is not able to obtain and afford medications at this time and at time of discharge.  Pt reports he can't afford his diabetic medication  and does not have any of this medication at home. Pt reports this medication alone is $45.00 a month.    Living Will/Healthcare Power of     Patient states patient has a LW and/or HCPA.   provided education regarding LW and HCPA and the completion of forms.    Coping/Mental Health    Patient reports he's coping well with hospital stay.   Patient denies mental health difficulties.      The pt reports he has lost many family members and misses them dearly.  The pt reports he's a very spiritual person and attends [employer] at [facility].     Discharge Planning    At time of discharge, patient plans to return to his own home under the care of self and spouse.  Patients wife and nephew  will transport patient.  Per rounds today, expected discharge date has not been medically determined at this time. Patient and patients caregiver  verbalize understanding and are involved in treatment planning and discharge process.    Additional Concerns    Patients denies additional needs and or concerns at this time.  Pt is being followed for needs, education, resources, information, emotional support, supportive counseling and for supportive and skilled discharge plan of care.  providing ongoing psychosocial support, education, resources and discharge planning as needed.  SW remains available.

## 2022-09-15 NOTE — ASSESSMENT & PLAN NOTE
-Hbg 6.8 at clinic visit, admitted with concerns of GIB as drop from BL of around 10  -No signs/symptoms of bleeding. CBC this AM with hbg 8.1, repeated to assess accuracy 8.8. Suspect lab error on 9/14 CBC  -GI consulted for w/u on admit  -CT C/A/P with no findings to explain acute anemia

## 2022-09-15 NOTE — PROGRESS NOTES
Occupational Therapy    Rococ France  MRN: [id number]    Verbal orders received to d/c OT orders as pt is functioning at baseline.         MOUNIKA Benavidez  9/15/2022

## 2022-09-15 NOTE — ASSESSMENT & PLAN NOTE
-s/p DT HM3 1/13/2021  -Speed set at 5100rpms on admit. Echo reviewed with LVEDD 6.65cm, speed increased to 5200rpms 9/14  -INR supratherapeutic. Hold coumadin  -LDH stable. Continue to monitor daily.    Procedure: Device Interrogation Including analysis of device parameters  Current Settings: Ventricular Assist Device  Review of device function is  stable    TXP LVAD INTERROGATIONS 9/15/2022 9/15/2022 9/15/2022 9/15/2022 9/15/2022 9/14/2022 9/14/2022   Type HeartMate3 HeartMate3 HeartMate3 HeartMate3 HeartMate3 HeartMate3 HeartMate3   Flow 4.7 - 4.3 4.6 4.5 4.5 4.7   Speed 5200 - 5200 5200 5200 5200 5200   PI 5.1 - 5.5 3.6 4.2 5.2 5.0   Power (Edwards) 3.7 - 3.6 3.6 3.6 3.7 3.8   LSL 4800 - 4800 4800 4800 4800 4800   Pulsatility Pulse - Pulse - - Pulse Pulse

## 2022-09-16 VITALS
WEIGHT: 168.19 LBS | OXYGEN SATURATION: 99 % | HEIGHT: 73 IN | SYSTOLIC BLOOD PRESSURE: 102 MMHG | HEART RATE: 94 BPM | TEMPERATURE: 98 F | RESPIRATION RATE: 18 BRPM | BODY MASS INDEX: 22.29 KG/M2

## 2022-09-16 LAB
ALBUMIN SERPL BCP-MCNC: 2.3 G/DL (ref 3.5–5.2)
ALP SERPL-CCNC: 133 U/L (ref 55–135)
ALT SERPL W/O P-5'-P-CCNC: 10 U/L (ref 10–44)
ANION GAP SERPL CALC-SCNC: 7 MMOL/L (ref 8–16)
APTT BLDCRRT: 46.1 SEC (ref 21–32)
AST SERPL-CCNC: 17 U/L (ref 10–40)
BASOPHILS # BLD AUTO: 0.03 K/UL (ref 0–0.2)
BASOPHILS NFR BLD: 0.3 % (ref 0–1.9)
BILIRUB DIRECT SERPL-MCNC: 0.5 MG/DL (ref 0.1–0.3)
BILIRUB SERPL-MCNC: 1 MG/DL (ref 0.1–1)
BNP SERPL-MCNC: 382 PG/ML (ref 0–99)
BUN SERPL-MCNC: 12 MG/DL (ref 8–23)
CALCIUM SERPL-MCNC: 9.3 MG/DL (ref 8.7–10.5)
CHLORIDE SERPL-SCNC: 105 MMOL/L (ref 95–110)
CO2 SERPL-SCNC: 25 MMOL/L (ref 23–29)
CREAT SERPL-MCNC: 0.7 MG/DL (ref 0.5–1.4)
CRP SERPL-MCNC: 118.9 MG/L (ref 0–8.2)
DIFFERENTIAL METHOD: ABNORMAL
EOSINOPHIL # BLD AUTO: 0.3 K/UL (ref 0–0.5)
EOSINOPHIL NFR BLD: 2.7 % (ref 0–8)
ERYTHROCYTE [DISTWIDTH] IN BLOOD BY AUTOMATED COUNT: 15.4 % (ref 11.5–14.5)
EST. GFR  (NO RACE VARIABLE): >60 ML/MIN/1.73 M^2
GLUCOSE SERPL-MCNC: 100 MG/DL (ref 70–110)
HCT VFR BLD AUTO: 25.7 % (ref 40–54)
HGB BLD-MCNC: 8.4 G/DL (ref 14–18)
IMM GRANULOCYTES # BLD AUTO: 0.03 K/UL (ref 0–0.04)
IMM GRANULOCYTES NFR BLD AUTO: 0.3 % (ref 0–0.5)
INR PPP: 2.5 (ref 0.8–1.2)
LDH SERPL L TO P-CCNC: 153 U/L (ref 110–260)
LYMPHOCYTES # BLD AUTO: 1.9 K/UL (ref 1–4.8)
LYMPHOCYTES NFR BLD: 15.8 % (ref 18–48)
MAGNESIUM SERPL-MCNC: 1.9 MG/DL (ref 1.6–2.6)
MCH RBC QN AUTO: 28.9 PG (ref 27–31)
MCHC RBC AUTO-ENTMCNC: 32.7 G/DL (ref 32–36)
MCV RBC AUTO: 88 FL (ref 82–98)
MONOCYTES # BLD AUTO: 1 K/UL (ref 0.3–1)
MONOCYTES NFR BLD: 8.7 % (ref 4–15)
NEUTROPHILS # BLD AUTO: 8.6 K/UL (ref 1.8–7.7)
NEUTROPHILS NFR BLD: 72.2 % (ref 38–73)
NRBC BLD-RTO: 0 /100 WBC
PLATELET # BLD AUTO: 251 K/UL (ref 150–450)
PMV BLD AUTO: 10.1 FL (ref 9.2–12.9)
POCT GLUCOSE: 166 MG/DL (ref 70–110)
POTASSIUM SERPL-SCNC: 4 MMOL/L (ref 3.5–5.1)
PREALB SERPL-MCNC: 6 MG/DL (ref 20–43)
PROT SERPL-MCNC: 8.4 G/DL (ref 6–8.4)
PROTHROMBIN TIME: 24.2 SEC (ref 9–12.5)
RBC # BLD AUTO: 2.91 M/UL (ref 4.6–6.2)
SODIUM SERPL-SCNC: 137 MMOL/L (ref 136–145)
WBC # BLD AUTO: 11.9 K/UL (ref 3.9–12.7)

## 2022-09-16 PROCEDURE — 83880 ASSAY OF NATRIURETIC PEPTIDE: CPT | Performed by: PHYSICIAN ASSISTANT

## 2022-09-16 PROCEDURE — 93750 INTERROGATION VAD IN PERSON: CPT | Mod: ,,, | Performed by: INTERNAL MEDICINE

## 2022-09-16 PROCEDURE — 99233 PR SUBSEQUENT HOSPITAL CARE,LEVL III: ICD-10-PCS | Mod: ,,, | Performed by: NURSE PRACTITIONER

## 2022-09-16 PROCEDURE — 85730 THROMBOPLASTIN TIME PARTIAL: CPT | Performed by: PHYSICIAN ASSISTANT

## 2022-09-16 PROCEDURE — 86140 C-REACTIVE PROTEIN: CPT | Performed by: PHYSICIAN ASSISTANT

## 2022-09-16 PROCEDURE — 83735 ASSAY OF MAGNESIUM: CPT | Performed by: PHYSICIAN ASSISTANT

## 2022-09-16 PROCEDURE — 84134 ASSAY OF PREALBUMIN: CPT | Performed by: PHYSICIAN ASSISTANT

## 2022-09-16 PROCEDURE — 93750 PR INTERROGATE VENT ASSIST DEV, IN PERSON, W PHYSICIAN ANALYSIS: ICD-10-PCS | Mod: ,,, | Performed by: INTERNAL MEDICINE

## 2022-09-16 PROCEDURE — 25000003 PHARM REV CODE 250: Performed by: PHYSICIAN ASSISTANT

## 2022-09-16 PROCEDURE — 85610 PROTHROMBIN TIME: CPT | Performed by: PHYSICIAN ASSISTANT

## 2022-09-16 PROCEDURE — 83615 LACTATE (LD) (LDH) ENZYME: CPT | Performed by: PHYSICIAN ASSISTANT

## 2022-09-16 PROCEDURE — 80048 BASIC METABOLIC PNL TOTAL CA: CPT | Performed by: PHYSICIAN ASSISTANT

## 2022-09-16 PROCEDURE — 85025 COMPLETE CBC W/AUTO DIFF WBC: CPT | Performed by: PHYSICIAN ASSISTANT

## 2022-09-16 PROCEDURE — 27000248 HC VAD-ADDITIONAL DAY

## 2022-09-16 PROCEDURE — 99233 SBSQ HOSP IP/OBS HIGH 50: CPT | Mod: ,,, | Performed by: NURSE PRACTITIONER

## 2022-09-16 PROCEDURE — 36415 COLL VENOUS BLD VENIPUNCTURE: CPT | Performed by: PHYSICIAN ASSISTANT

## 2022-09-16 PROCEDURE — 80076 HEPATIC FUNCTION PANEL: CPT | Performed by: PHYSICIAN ASSISTANT

## 2022-09-16 RX ORDER — FERROUS GLUCONATE 324(37.5)
324 TABLET ORAL
Qty: 30 TABLET | Refills: 11 | Status: SHIPPED | OUTPATIENT
Start: 2022-09-17 | End: 2023-12-11 | Stop reason: SDUPTHER

## 2022-09-16 RX ADMIN — SPIRONOLACTONE 25 MG: 25 TABLET, FILM COATED ORAL at 09:09

## 2022-09-16 RX ADMIN — ATORVASTATIN CALCIUM 80 MG: 20 TABLET, FILM COATED ORAL at 09:09

## 2022-09-16 RX ADMIN — AMLODIPINE BESYLATE 5 MG: 5 TABLET ORAL at 09:09

## 2022-09-16 RX ADMIN — POLYETHYLENE GLYCOL 3350 17 G: 17 POWDER, FOR SOLUTION ORAL at 09:09

## 2022-09-16 RX ADMIN — Medication 400 MG: at 09:09

## 2022-09-16 RX ADMIN — SENNOSIDES AND DOCUSATE SODIUM 1 TABLET: 50; 8.6 TABLET ORAL at 09:09

## 2022-09-16 RX ADMIN — DIGOXIN 0.12 MG: 125 TABLET ORAL at 09:09

## 2022-09-16 RX ADMIN — FUROSEMIDE 40 MG: 40 TABLET ORAL at 06:09

## 2022-09-16 RX ADMIN — FERROUS GLUCONATE 324 MG: 324 TABLET ORAL at 09:09

## 2022-09-16 RX ADMIN — LISINOPRIL 5 MG: 5 TABLET ORAL at 09:09

## 2022-09-16 NOTE — PLAN OF CARE
[facility]   Heart Transplant/VAD Clinic   [address] [place] [address]   [phone] after hours          [phone] fax     VAD HOME  HEALTH ORDERS      Admit to Home Health    Diagnosis:   Patient Active Problem List   Diagnosis    ICD (implantable cardioverter-defibrillator) in place    NICM (nonischemic cardiomyopathy)    Leg pain, left    Essential hypertension    Type 2 diabetes mellitus without complication    Hyperlipidemia    CAD (coronary artery disease)    Counseling regarding advanced care planning and goals of care    LVAD (left ventricular assist device) present    Acute blood loss anemia    Anticoagulated    Bilateral subdural hematomas    PSVT (paroxysmal supraventricular tachycardia)    Chronic combined systolic and diastolic heart failure    Subarachnoid hemorrhage    Dizziness    Diplopia    Atrial fibrillation    Anemia       Patient is homebound due to:  NYHA Class IV HF. S/P LVAD placement.     Diet: Low Fat, Low cholesterol, 2Gm Na, Coumadin restrictions.    Acitivities: No Swimming, bathing, vacuuming, contact sports.    Fresh implants= Sternal Precautions    Nursing:   SN to complete comprehensive assessment including routine vital signs. Instruct on disease process and s/s of complications to report to MD. Review/verify medication list sent home with the patient at time of discharge  and instruct patient/caregiver as needed. Frequency may be adjusted depending on start of care date.    **LVAD driveline exit site dressing change is to be completed per LVAD patient/caregiver only**.    Notify MD if:  SBP > 120 or < 80;   MAP > 80 or < 65;   HR > 120 or < 60;   Temp > 101;   Weight gain >3lbs in 1 day or 5lbs in 1 week.    LABS:  SN to perform labs: PT/INR per Coumadin clinic [phone].   Follow up INR date: 9/20/22 Weekly bmp, cbc  No Finger Sticks    Send initial Home Health orders to [facility] attending physician on call.  Send  follow up questions to VAD clinic MD [phone] or [phone].

## 2022-09-16 NOTE — ASSESSMENT & PLAN NOTE
-s/p DT HM3 1/13/2021  -Speed set at 5100rpms on admit. Echo reviewed with LVEDD 6.65cm, speed increased to 5200rpms 9/14  -INR supratherapeutic. Cont coumadin  -LDH stable. Continue to monitor daily.    Procedure: Device Interrogation Including analysis of device parameters  Current Settings: Ventricular Assist Device  Review of device function is  stable    TXP LVAD INTERROGATIONS 9/16/2022 9/16/2022 9/16/2022 9/15/2022 9/15/2022 9/15/2022 9/15/2022   Type HeartMate3 HeartMate3 HeartMate3 HeartMate3 HeartMate3 HeartMate3 HeartMate3   Flow 4.5 4.3 4.4 4.3 4.5 4.7 -   Speed 5250 5200 5200 5200 5200 5200 -   PI 5.4 5.2 5.3 6.1 5.2 5.1 -   Power (Edwards) 3.6 3.7 3.6 3.8 3.7 3.7 -   LSL 4800 4800 4800 4800 4800 4800 -   Pulsatility Pulse - - - Pulse Pulse -

## 2022-09-16 NOTE — PLAN OF CARE
Problem: Adult Inpatient Plan of Care  Goal: Plan of Care Review  Outcome: Ongoing, Progressing  Goal: Patient-Specific Goal (Individualized)  Outcome: Ongoing, Progressing  Goal: Absence of Hospital-Acquired Illness or Injury  Outcome: Ongoing, Progressing  Goal: Optimal Comfort and Wellbeing  Outcome: Ongoing, Progressing  Goal: Readiness for Transition of Care  Outcome: Ongoing, Progressing   Plan for discharge home today.

## 2022-09-16 NOTE — PROGRESS NOTES
Discharge Note:   Possible dc today per team.  SW contacted [facility] [phone] to notify of possible dc and faxed orders to resume services.  Met with pt to discuss plans.  Pt voiced understanding and agreement to dc plans.   No other dc needs indicated by pt or team at this time.

## 2022-09-16 NOTE — DISCHARGE SUMMARY
[facility] - Cardiology Stepdown  Heart Transplant  Discharge Summary      Patient Name: Rocco France  MRN: [id number]  Admission Date: 9/14/2022  Hospital Length of Stay: 2 days  Discharge Date and Time: 09/16/2022 2:34 PM  Attending Physician: Manuel Ramos Jr.,*   Discharging Provider: Ashish Yen NP  Primary Care Provider: Jay Guardado DO     HPI/Hospital Course:: 68 yo BM with stage D CHF due to non-ischemic cardiomyopathy status post DT HM3 implantation 1/13/2021 with sternal closure 1/14/2021, PAF, HTN, HLD, DM2 admitted from clinic today with concerns for GIB given drop in hbg by 3 units. Patient reports no signs of bleeding. ROS negative except for constipation a couple of days ago with large BM yesterday. Normal in color (brown). INR supratherapeutic, CBC with leukocytosis, and CRP elevated. Workup for infection and GIB neg. Iron studies also sent. GI consult placed and CT C/A/P ordered and unremarkable. Repeat HH at baseline. Started on PO iron. Discharged home next day. Will f/u in clinic as scheduled.       Consults (From admission, onward)          Status Ordering Provider     Inpatient consult to Gastroenterology  Once        Provider:  (Not yet assigned)    Completed MIC RASHID     Inpatient consult to Endocrinology  Once        Provider:  (Not yet assigned)    Completed MIC RASHID            Significant Diagnostic Studies: Labs: CMP   Recent Labs   Lab 09/15/22  0430 09/16/22  0600    137   K 4.1 4.0    105   CO2 22* 25    100   BUN 15 12   CREATININE 0.7 0.7   CALCIUM 8.8 9.3   PROT  --  8.4   ALBUMIN  --  2.3*   BILITOT  --  1.0   ALKPHOS  --  133   AST  --  17   ALT  --  10   ANIONGAP 9 7*   , CBC   Recent Labs   Lab 09/15/22  1030 09/15/22  1457 09/16/22  0600   WBC 11.43 12.88* 11.90   HGB 8.1* 8.8* 8.4*   HCT 25.2* 27.2* 25.7*    291 251   , and INR   Lab Results   Component Value Date    INR 2.5 (H) 09/16/2022    INR 5.4 () 09/15/2022     INR 4.0 (H) 09/14/2022       Pending Diagnostic Studies:       None          Final Active Diagnoses:    Diagnosis Date Noted POA    PRINCIPAL PROBLEM:  LVAD (left ventricular assist device) present [Z95.811] 01/13/2021 Not Applicable    Anemia [D64.9] 09/15/2022 Yes    Type 2 diabetes mellitus without complication [E11.9] 11/17/2015 Yes      Problems Resolved During this Admission:      Discharged Condition: good    Disposition:     Follow Up:    Patient Instructions:   No discharge procedures on file.  Medications:  Reconciled Home Medications:      Medication List        START taking these medications      ferrous gluconate 324 mg (37.5 mg iron) Tab tablet  Take 1 tablet (324 mg total) by mouth daily with breakfast.  Start taking on: September 17, 2022            CONTINUE taking these medications      amLODIPine 5 MG tablet  Commonly known as: NORVASC  Take 1 tablet (5 mg total) by mouth once daily.     atorvastatin 80 MG tablet  Commonly known as: LIPITOR  Take 1 tablet by mouth once daily     digoxin 125 mcg tablet  Commonly known as: LANOXIN  Take 1 tablet (0.125 mg total) by mouth once daily.     docusate sodium 100 MG capsule  Commonly known as: COLACE  Take 100 mg by mouth Daily.     furosemide 40 MG tablet  Commonly known as: LASIX  Take 1 tablet (40 mg total) by mouth once daily at 6am.     lisinopriL 5 MG tablet  Commonly known as: PRINIVIL,ZESTRIL  Take 1 tablet (5 mg total) by mouth once daily.     magnesium oxide 400 mg (241.3 mg magnesium) tablet  Commonly known as: MAG-OX  Take 1 tablet (400 mg total) by mouth 3 (three) times daily.     mirtazapine 15 MG tablet  Commonly known as: REMERON  Take 1 tablet (15 mg total) by mouth every evening.     SITagliptin 100 MG Tab  Commonly known as: JANUVIA  Take 1 tablet (100 mg total) by mouth once daily.     spironolactone 25 MG tablet  Commonly known as: ALDACTONE  Take 1 tablet (25 mg total) by mouth once daily.     warfarin 5 MG tablet  Commonly known as:  COUMADIN  Take 1 tablet (5 mg total) by mouth Daily.            STOP taking these medications      acetaminophen 500 mg Cap  Commonly known as: TYLENOL     polyethylene glycol 17 gram Pwpk  Commonly known as: GLYCOLAX              Ashish Yen NP  Heart Transplant  [facility] - Cardiology Stepdown

## 2022-09-16 NOTE — ASSESSMENT & PLAN NOTE
-Hbg 6.8 at clinic visit, admitted with concerns of GIB as drop from BL of around 10  -No signs/symptoms of bleeding. Suspect lab error on 9/14 CBC  -CT C/A/P with no findings to explain acute anemia

## 2022-09-16 NOTE — PROGRESS NOTES
09/16/2022  Rafal Cullen    Current provider:  Manuel Ramos Jr.,*    Device interrogation:  TXP LVAD INTERROGATIONS 9/16/2022 9/16/2022 9/16/2022 9/16/2022 9/15/2022 9/15/2022 9/15/2022   Type HeartMate3 HeartMate3 HeartMate3 HeartMate3 HeartMate3 HeartMate3 HeartMate3   Flow 4.5 4.5 4.3 4.4 4.3 4.5 4.7   Speed 5200 5250 5200 5200 5200 5200 5200   PI 5.0 5.4 5.2 5.3 6.1 5.2 5.1   Power (Edwards) 3.7 3.6 3.7 3.6 3.8 3.7 3.7   LSL 4800 4800 4800 4800 4800 4800 4800   Pulsatility Pulse Pulse - - - Pulse Pulse          Rounded on Rocco France to ensure all mechanical assist device settings (IABP or VAD) were appropriate and all parameters were within limits.  I was able to ensure all back up equipment was present, the staff had no issues, and the Perfusion Department daily rounding was complete.      For implantable VADs: Interrogation of Ventricular assist device was performed with analysis of device parameters and review of device function. I have personally reviewed the interrogation findings and agree with findings as stated.     In emergency, the nursing units have been notified to contact the perfusion department either by:  Calling [phone] from 630am to 4pm Mon thru Fri, utilizing the On-Call Finder functionality of Epic and searching for Perfusion, or by contacting the hospital  from 4pm to 630am and on weekends and asking to speak with the perfusionist on call.    1:29 PM

## 2022-09-16 NOTE — PROGRESS NOTES
[facility] Stepdown  Heart Transplant  Progress Note    Patient Name: Rocco France  MRN: [id number]  Admission Date: 9/14/2022  Hospital Length of Stay: 2 days  Attending Physician: Manuel Ramos Jr.,*  Primary Care Provider: Jay Guardado DO  Principal Problem:LVAD (left ventricular assist device) present    Subjective:     Interval History: No acute issues or complaints this am.   Scheduled Meds:   amLODIPine  5 mg Oral Daily    atorvastatin  80 mg Oral Daily    digoxin  0.125 mg Oral Daily    ferrous gluconate  324 mg Oral Daily with breakfast    furosemide  40 mg Oral Daily    lisinopriL  5 mg Oral Daily    magnesium oxide  400 mg Oral TID    mirtazapine  15 mg Oral QHS    polyethylene glycol  17 g Oral Daily    senna-docusate 8.6-50 mg  1 tablet Oral Daily    spironolactone  25 mg Oral Daily     PRN Meds:acetaminophen, dextrose 10%, dextrose 10%, glucagon (human recombinant), glucose, glucose, insulin aspart U-100    Review of patient's allergies indicates:   Allergen Reactions    Pcn [penicillins] Hives     Objective:     Vital Signs (Most Recent):  Temp: 98.1 °F (36.7 °C) (09/16/22 0906)  Pulse: 84 (09/16/22 0713)  Resp: 18 (09/16/22 0508)  BP: (!) 110/0 (09/16/22 0906)  SpO2: 99 % (09/16/22 0508) Vital Signs (24h Range):  Temp:  [97.5 °F (36.4 °C)-98.7 °F (37.1 °C)] 98.1 °F (36.7 °C)  Pulse:  [] 84  Resp:  [17-18] 18  SpO2:  [93 %-99 %] 99 %  BP: ()/(0-86) 110/0     Patient Vitals for the past 72 hrs (Last 3 readings):   Weight   09/16/22 0906 76.3 kg (168 lb 3.4 oz)   09/15/22 0750 76.9 kg (169 lb 8.5 oz)   09/14/22 1330 77.6 kg (171 lb 1.2 oz)       Body mass index is 22.19 kg/m².      Intake/Output Summary (Last 24 hours) at 9/16/2022 0939  Last data filed at 9/16/2022 0900  Gross per 24 hour   Intake 1482 ml   Output 2900 ml   Net -1418 ml            Physical Exam  Vitals and nursing note reviewed.   Constitutional:       Appearance: Normal appearance. He is  not ill-appearing.   HENT:      Head: Normocephalic and atraumatic.      Right Ear: External ear normal.      Left Ear: External ear normal.      Nose: Nose normal.   Eyes:      Extraocular Movements: Extraocular movements intact.      Conjunctiva/sclera: Conjunctivae normal.   Cardiovascular:      Rate and Rhythm: Normal rate and regular rhythm.      Comments: VAD hum  Pulmonary:      Effort: Pulmonary effort is normal.      Breath sounds: Normal breath sounds.   Abdominal:      General: Bowel sounds are normal.      Palpations: Abdomen is soft.   Musculoskeletal:         General: No swelling. Normal range of motion.      Cervical back: Normal range of motion and neck supple.      Right lower leg: No edema.      Left lower leg: No edema.   Skin:     General: Skin is warm and dry.   Neurological:      General: No focal deficit present.      Mental Status: He is alert and oriented to person, place, and time.      Coordination: Coordination abnormal.   Psychiatric:         Mood and Affect: Mood normal.         Behavior: Behavior normal.       Significant Labs:  CBC:  Recent Labs   Lab 09/15/22  1030 09/15/22  1457 09/16/22  0600   WBC 11.43 12.88* 11.90   RBC 2.88* 3.10* 2.91*   HGB 8.1* 8.8* 8.4*   HCT 25.2* 27.2* 25.7*    291 251   MCV 88 88 88   MCH 28.1 28.4 28.9   MCHC 32.1 32.4 32.7       BNP:  Recent Labs   Lab 09/14/22  0821 09/16/22  0600   * 382*       CMP:  Recent Labs   Lab 09/14/22  0821 09/15/22  0430 09/16/22  0600    101 100   CALCIUM 9.7 8.8 9.3   ALBUMIN 2.9*  --  2.3*   PROT 10.2*  --  8.4    139 137   K 3.8 4.1 4.0   CO2 25 22* 25    108 105   BUN 12 15 12   CREATININE 0.9 0.7 0.7   ALKPHOS 135  --  133   ALT 13  --  10   AST 19  --  17   BILITOT 1.5*  --  1.0        Coagulation:   Recent Labs   Lab 09/14/22  1543 09/15/22  0430 09/16/22  0600   INR 4.0* 5.4* 2.5*   APTT  --  43.3* 46.1*       LDH:  Recent Labs   Lab 09/14/22  0821 09/15/22  0430 09/16/22  0600     169 153       Microbiology:  Microbiology Results (last 7 days)       Procedure Component Value Units Date/Time    Blood culture [[id number]] Collected: 09/14/22 1543    Order Status: Completed Specimen: Blood from Antecubital, Left Updated: 09/15/22 1812     Blood Culture, Routine No Growth to date      No Growth to date    Blood culture [[id number]] Collected: 09/14/22 1552    Order Status: Completed Specimen: Blood from Antecubital, Right Updated: 09/15/22 1812     Blood Culture, Routine No Growth to date      No Growth to date            I have reviewed all pertinent labs within the past 24 hours.    Estimated Creatinine Clearance: 110.5 mL/min (based on SCr of 0.7 mg/dL).    Diagnostic Results:  I have reviewed all pertinent imaging results/findings within the past 24 hours.    Assessment and Plan:     No notes on file    * LVAD (left ventricular assist device) present  -s/p DT HM3 1/13/2021  -Speed set at 5100rpms on admit. Echo reviewed with LVEDD 6.65cm, speed increased to 5200rpms 9/14  -INR supratherapeutic. Cont coumadin  -LDH stable. Continue to monitor daily.    Procedure: Device Interrogation Including analysis of device parameters  Current Settings: Ventricular Assist Device  Review of device function is  stable    TXP LVAD INTERROGATIONS 9/16/2022 9/16/2022 9/16/2022 9/15/2022 9/15/2022 9/15/2022 9/15/2022   Type HeartMate3 HeartMate3 HeartMate3 HeartMate3 HeartMate3 HeartMate3 HeartMate3   Flow 4.5 4.3 4.4 4.3 4.5 4.7 -   Speed 5250 5200 5200 5200 5200 5200 -   PI 5.4 5.2 5.3 6.1 5.2 5.1 -   Power (Edwards) 3.6 3.7 3.6 3.8 3.7 3.7 -   LSL 4800 4800 4800 4800 4800 4800 -   Pulsatility Pulse - - - Pulse Pulse -       Anemia  -Hbg 6.8 at clinic visit, admitted with concerns of GIB as drop from BL of around 10  -No signs/symptoms of bleeding. Suspect lab error on 9/14 CBC  -CT C/A/P with no findings to explain acute anemia      Ashish Yen, NP  Heart Transplant  [facility] - Cardiology  Stepdown

## 2022-09-16 NOTE — PROGRESS NOTES
DISCHARGE NOTE:    Rocco France is a 67 y.o. male s/p HM3 lvad from 1.13.21 was admitted for evaluation of GIB.     Past Medical History:   Diagnosis Date    Acute respiratory failure requiring reintubation 1/28/2022    CLARISSE (acute kidney injury) 1/11/2021    Diabetes mellitus     Kidney stones        Hospital Course: During his hospital stay Mr. France's H/H returned to similar baseline. His warfarin was held for elevated inr of 5.4. His inr decreased to 2.5 today. Plan for discharge today and resume previous home dose of warfarin.     Pharmacy Interventions/Recommendations:  1) INR Goal: 2-3    2) Antiplatelet Agents: ASA 81mg    3) Heparin Bridging:  UFH    4) INR Follow-Up/Discharge Needs:  Monday with coumadin clinic     See list of discharge medication for dosing instructions.     oRcco France and his caregiver verbalized their understanding and had the opportunity to ask questions.      Discharge Medications:     Medication List        STOP taking these medications      acetaminophen 500 mg Cap  Commonly known as: TYLENOL     polyethylene glycol 17 gram Pwpk  Commonly known as: GLYCOLAX     SITagliptin 100 MG Tab  Commonly known as: JANUVIA            ASK your doctor about these medications      amLODIPine 5 MG tablet  Commonly known as: NORVASC  Take 1 tablet (5 mg total) by mouth once daily.     atorvastatin 80 MG tablet  Commonly known as: LIPITOR  Take 1 tablet by mouth once daily     digoxin 125 mcg tablet  Commonly known as: LANOXIN  Take 1 tablet (0.125 mg total) by mouth once daily.     docusate sodium 100 MG capsule  Commonly known as: COLACE     furosemide 40 MG tablet  Commonly known as: LASIX  Take 1 tablet (40 mg total) by mouth once daily at 6am.     lisinopriL 5 MG tablet  Commonly known as: PRINIVIL,ZESTRIL  Take 1 tablet (5 mg total) by mouth once daily.     magnesium oxide 400 mg (241.3 mg magnesium) tablet  Commonly known as: MAG-OX  Take 1 tablet (400 mg total) by mouth 3 (three) times  daily.     mirtazapine 15 MG tablet  Commonly known as: REMERON  Take 1 tablet (15 mg total) by mouth every evening.     spironolactone 25 MG tablet  Commonly known as: ALDACTONE  Take 1 tablet (25 mg total) by mouth once daily.     warfarin 5 MG tablet  Commonly known as: COUMADIN  Take 1 tablet (5 mg total) by mouth Daily.

## 2022-09-16 NOTE — CARE UPDATE
BG goal 140 - 180   Diet full liquid  * Surgery Date in Future *    BG stable without current use of insulin therapy. Patient eating full meals. Endocrinology will continue to follow, and manage glycemic control while inpatient.     - continue Low Dose SQ Insulin Correction Scale PRN hyperglycemia.   - BG Monitoring AC/HS     ** Please call Endocrine for any BG related issues **  ** Please notify Endocrine for any change and/or advance in diet**    Lab Results   Component Value Date    HGBA1C 6.5 (H) 09/14/2022       Discharge Planning:   - PRN refill of Insurance preferred diabetes testing supplies  - continue Januvia 50 mg PO daily.   - Patient is to monitor blood sugar AcHs, and follow-up with PCP for continued DM management and care.

## 2022-09-16 NOTE — SUBJECTIVE & OBJECTIVE
Interval History: No acute issues or complaints this am.   Scheduled Meds:   amLODIPine  5 mg Oral Daily    atorvastatin  80 mg Oral Daily    digoxin  0.125 mg Oral Daily    ferrous gluconate  324 mg Oral Daily with breakfast    furosemide  40 mg Oral Daily    lisinopriL  5 mg Oral Daily    magnesium oxide  400 mg Oral TID    mirtazapine  15 mg Oral QHS    polyethylene glycol  17 g Oral Daily    senna-docusate 8.6-50 mg  1 tablet Oral Daily    spironolactone  25 mg Oral Daily     PRN Meds:acetaminophen, dextrose 10%, dextrose 10%, glucagon (human recombinant), glucose, glucose, insulin aspart U-100    Review of patient's allergies indicates:   Allergen Reactions    Pcn [penicillins] Hives     Objective:     Vital Signs (Most Recent):  Temp: 98.1 °F (36.7 °C) (09/16/22 0906)  Pulse: 84 (09/16/22 0713)  Resp: 18 (09/16/22 0508)  BP: (!) 110/0 (09/16/22 0906)  SpO2: 99 % (09/16/22 0508) Vital Signs (24h Range):  Temp:  [97.5 °F (36.4 °C)-98.7 °F (37.1 °C)] 98.1 °F (36.7 °C)  Pulse:  [] 84  Resp:  [17-18] 18  SpO2:  [93 %-99 %] 99 %  BP: ()/(0-86) 110/0     Patient Vitals for the past 72 hrs (Last 3 readings):   Weight   09/16/22 0906 76.3 kg (168 lb 3.4 oz)   09/15/22 0750 76.9 kg (169 lb 8.5 oz)   09/14/22 1330 77.6 kg (171 lb 1.2 oz)       Body mass index is 22.19 kg/m².      Intake/Output Summary (Last 24 hours) at 9/16/2022 0939  Last data filed at 9/16/2022 0900  Gross per 24 hour   Intake 1482 ml   Output 2900 ml   Net -1418 ml            Physical Exam  Vitals and nursing note reviewed.   Constitutional:       Appearance: Normal appearance. He is not ill-appearing.   HENT:      Head: Normocephalic and atraumatic.      Right Ear: External ear normal.      Left Ear: External ear normal.      Nose: Nose normal.   Eyes:      Extraocular Movements: Extraocular movements intact.      Conjunctiva/sclera: Conjunctivae normal.   Cardiovascular:      Rate and Rhythm: Normal rate and regular rhythm.       Comments: VAD hum  Pulmonary:      Effort: Pulmonary effort is normal.      Breath sounds: Normal breath sounds.   Abdominal:      General: Bowel sounds are normal.      Palpations: Abdomen is soft.   Musculoskeletal:         General: No swelling. Normal range of motion.      Cervical back: Normal range of motion and neck supple.      Right lower leg: No edema.      Left lower leg: No edema.   Skin:     General: Skin is warm and dry.   Neurological:      General: No focal deficit present.      Mental Status: He is alert and oriented to person, place, and time.      Coordination: Coordination abnormal.   Psychiatric:         Mood and Affect: Mood normal.         Behavior: Behavior normal.       Significant Labs:  CBC:  Recent Labs   Lab 09/15/22  1030 09/15/22  1457 09/16/22  0600   WBC 11.43 12.88* 11.90   RBC 2.88* 3.10* 2.91*   HGB 8.1* 8.8* 8.4*   HCT 25.2* 27.2* 25.7*    291 251   MCV 88 88 88   MCH 28.1 28.4 28.9   MCHC 32.1 32.4 32.7       BNP:  Recent Labs   Lab 09/14/22  0821 09/16/22  0600   * 382*       CMP:  Recent Labs   Lab 09/14/22  0821 09/15/22  0430 09/16/22  0600    101 100   CALCIUM 9.7 8.8 9.3   ALBUMIN 2.9*  --  2.3*   PROT 10.2*  --  8.4    139 137   K 3.8 4.1 4.0   CO2 25 22* 25    108 105   BUN 12 15 12   CREATININE 0.9 0.7 0.7   ALKPHOS 135  --  133   ALT 13  --  10   AST 19  --  17   BILITOT 1.5*  --  1.0        Coagulation:   Recent Labs   Lab 09/14/22  1543 09/15/22  0430 09/16/22  0600   INR 4.0* 5.4* 2.5*   APTT  --  43.3* 46.1*       LDH:  Recent Labs   Lab 09/14/22  0821 09/15/22  0430 09/16/22  0600    169 153       Microbiology:  Microbiology Results (last 7 days)       Procedure Component Value Units Date/Time    Blood culture [[id number]] Collected: 09/14/22 1543    Order Status: Completed Specimen: Blood from Antecubital, Left Updated: 09/15/22 1812     Blood Culture, Routine No Growth to date      No Growth to date    Blood culture  [[id number]] Collected: 09/14/22 1552    Order Status: Completed Specimen: Blood from Antecubital, Right Updated: 09/15/22 1812     Blood Culture, Routine No Growth to date      No Growth to date            I have reviewed all pertinent labs within the past 24 hours.    Estimated Creatinine Clearance: 110.5 mL/min (based on SCr of 0.7 mg/dL).    Diagnostic Results:  I have reviewed all pertinent imaging results/findings within the past 24 hours.

## 2022-09-19 ENCOUNTER — ANTI-COAG VISIT (OUTPATIENT)
Dept: CARDIOLOGY | Facility: CLINIC | Age: 67
End: 2022-09-19
Payer: MEDICARE

## 2022-09-19 ENCOUNTER — PATIENT OUTREACH (OUTPATIENT)
Dept: ADMINISTRATIVE | Facility: CLINIC | Age: 67
End: 2022-09-19
Payer: MEDICARE

## 2022-09-19 DIAGNOSIS — Z95.811 LVAD (LEFT VENTRICULAR ASSIST DEVICE) PRESENT: Primary | ICD-10-CM

## 2022-09-19 LAB
BACTERIA BLD CULT: NORMAL
BACTERIA BLD CULT: NORMAL
INR PPP: 2.3

## 2022-09-19 PROCEDURE — 93793 ANTICOAG MGMT PT WARFARIN: CPT | Mod: S$GLB,,,

## 2022-09-19 PROCEDURE — 93793 PR ANTICOAGULANT MGMT FOR PT TAKING WARFARIN: ICD-10-PCS | Mod: S$GLB,,,

## 2022-09-19 NOTE — PROGRESS NOTES
Admitted 9/14-9/16. Hospital course: Rocco France is a 67 y.o. male s/p HM3 lvad from 1.13.21 was admitted for evaluation of GIB. During his hospital stay Mr. France's H/H returned to similar baseline. His warfarin was held for elevated inr of 5.4. His inr decreased to 2.5 today. Plan for discharge today and resume previous home dose of warfarin.

## 2022-09-19 NOTE — PROGRESS NOTES
C3 nurse spoke with Rocco France for a TCC post hospital discharge follow up call. The patient reports does not have a scheduled HOSFU appointment. C3 nurse was unable to schedule [facility]OSFU appointment [facility]. Patient advised to contact their PCP to schedule a HOSPFU within 5-7 days. NOT IN [facility] AREA.

## 2022-09-23 ENCOUNTER — ANTI-COAG VISIT (OUTPATIENT)
Dept: CARDIOLOGY | Facility: CLINIC | Age: 67
End: 2022-09-23
Payer: MEDICARE

## 2022-09-23 DIAGNOSIS — Z95.811 LVAD (LEFT VENTRICULAR ASSIST DEVICE) PRESENT: Primary | ICD-10-CM

## 2022-09-23 LAB — INR PPP: 2.4

## 2022-09-23 PROCEDURE — 93793 ANTICOAG MGMT PT WARFARIN: CPT | Mod: S$GLB,,,

## 2022-09-23 PROCEDURE — 93793 PR ANTICOAGULANT MGMT FOR PT TAKING WARFARIN: ICD-10-PCS | Mod: S$GLB,,,

## 2022-09-26 ENCOUNTER — ANTI-COAG VISIT (OUTPATIENT)
Dept: CARDIOLOGY | Facility: CLINIC | Age: 67
End: 2022-09-26
Payer: MEDICARE

## 2022-09-26 DIAGNOSIS — Z95.811 LVAD (LEFT VENTRICULAR ASSIST DEVICE) PRESENT: Primary | ICD-10-CM

## 2022-09-26 LAB — INR PPP: 4.32

## 2022-09-26 PROCEDURE — 93793 PR ANTICOAGULANT MGMT FOR PT TAKING WARFARIN: ICD-10-PCS | Mod: S$GLB,,,

## 2022-09-26 PROCEDURE — 93793 ANTICOAG MGMT PT WARFARIN: CPT | Mod: S$GLB,,,

## 2022-09-26 NOTE — PROGRESS NOTES
Meme reported correct Warfarin dose, patient stated he has been having bilateral knee pain for 2 days(9/24/22 and 9/25/22) and has been taking Tylenol for the pain, no other changes noted.

## 2022-09-27 ENCOUNTER — TELEPHONE (OUTPATIENT)
Dept: PALLIATIVE MEDICINE | Facility: CLINIC | Age: 67
End: 2022-09-27
Payer: MEDICARE

## 2022-09-28 ENCOUNTER — ANTI-COAG VISIT (OUTPATIENT)
Dept: CARDIOLOGY | Facility: CLINIC | Age: 67
End: 2022-09-28
Payer: MEDICARE

## 2022-09-28 DIAGNOSIS — Z95.811 LVAD (LEFT VENTRICULAR ASSIST DEVICE) PRESENT: Primary | ICD-10-CM

## 2022-09-28 LAB — INR PPP: 2.4

## 2022-09-28 PROCEDURE — 93793 ANTICOAG MGMT PT WARFARIN: CPT | Mod: S$GLB,,,

## 2022-09-28 PROCEDURE — 93793 PR ANTICOAGULANT MGMT FOR PT TAKING WARFARIN: ICD-10-PCS | Mod: S$GLB,,,

## 2022-09-30 ENCOUNTER — TELEPHONE (OUTPATIENT)
Dept: TRANSPLANT | Facility: CLINIC | Age: 67
End: 2022-09-30
Payer: MEDICARE

## 2022-09-30 RX ORDER — ATORVASTATIN CALCIUM 80 MG/1
80 TABLET, FILM COATED ORAL DAILY
Qty: 90 TABLET | Refills: 3 | Status: SHIPPED | OUTPATIENT
Start: 2022-09-30 | End: 2022-10-03

## 2022-09-30 NOTE — TELEPHONE ENCOUNTER
Communication received from [facility]  Patient stated he hasn't taken his lasix since last week. He stated [facility] could not refill his medication until they speak with physician office    It was confirmed with [facility] that patient has refills available. Patient is to call and request refills.     This information was relayed to VAD coordinator.

## 2022-10-04 ENCOUNTER — ANTI-COAG VISIT (OUTPATIENT)
Dept: CARDIOLOGY | Facility: CLINIC | Age: 67
End: 2022-10-04
Payer: MEDICARE

## 2022-10-04 DIAGNOSIS — Z95.811 LVAD (LEFT VENTRICULAR ASSIST DEVICE) PRESENT: Primary | ICD-10-CM

## 2022-10-04 LAB — INR PPP: 1.9

## 2022-10-04 PROCEDURE — 93793 ANTICOAG MGMT PT WARFARIN: CPT | Mod: S$GLB,,,

## 2022-10-04 PROCEDURE — 93793 PR ANTICOAGULANT MGMT FOR PT TAKING WARFARIN: ICD-10-PCS | Mod: S$GLB,,,

## 2022-10-04 NOTE — PROGRESS NOTES
10/4/22-Spoke to Meme regarding missed INR on 10/3/22. I advised Meme that I spoke with Jennifer at [facility] and she stated patient didn't come back in for INR on 10/3/22. Meme verbalized understanding and stated she will have patient go back to the lab today for INR.

## 2022-10-04 NOTE — PROGRESS NOTES
INR slightly below goal. Medications, chart, and patient findings reviewed. Recently elevated for which we had to hold - will continue new maintenance dose without boosting & recheck INR again this week.

## 2022-10-04 NOTE — PROGRESS NOTES
Meme reports correct Warfarin dose, stated patient has been taking over the counter Tylenol for knee pain, no other changes noted.

## 2022-10-06 ENCOUNTER — ANTI-COAG VISIT (OUTPATIENT)
Dept: CARDIOLOGY | Facility: CLINIC | Age: 67
End: 2022-10-06
Payer: MEDICARE

## 2022-10-06 DIAGNOSIS — Z95.811 LVAD (LEFT VENTRICULAR ASSIST DEVICE) PRESENT: Primary | ICD-10-CM

## 2022-10-06 LAB — INR PPP: 1.9

## 2022-10-06 PROCEDURE — 93793 PR ANTICOAGULANT MGMT FOR PT TAKING WARFARIN: ICD-10-PCS | Mod: S$GLB,,,

## 2022-10-06 PROCEDURE — 93793 ANTICOAG MGMT PT WARFARIN: CPT | Mod: S$GLB,,,

## 2022-10-10 ENCOUNTER — ANTI-COAG VISIT (OUTPATIENT)
Dept: CARDIOLOGY | Facility: CLINIC | Age: 67
End: 2022-10-10
Payer: MEDICARE

## 2022-10-10 DIAGNOSIS — Z95.811 LVAD (LEFT VENTRICULAR ASSIST DEVICE) PRESENT: Primary | ICD-10-CM

## 2022-10-10 LAB — INR PPP: 2

## 2022-10-10 PROCEDURE — 93793 ANTICOAG MGMT PT WARFARIN: CPT | Mod: S$GLB,,,

## 2022-10-10 PROCEDURE — 93793 PR ANTICOAGULANT MGMT FOR PT TAKING WARFARIN: ICD-10-PCS | Mod: S$GLB,,,

## 2022-10-13 ENCOUNTER — ANTI-COAG VISIT (OUTPATIENT)
Dept: CARDIOLOGY | Facility: CLINIC | Age: 67
End: 2022-10-13
Payer: MEDICARE

## 2022-10-13 DIAGNOSIS — Z95.811 LVAD (LEFT VENTRICULAR ASSIST DEVICE) PRESENT: Primary | ICD-10-CM

## 2022-10-13 LAB — INR PPP: 2.1

## 2022-10-13 PROCEDURE — 93793 PR ANTICOAGULANT MGMT FOR PT TAKING WARFARIN: ICD-10-PCS | Mod: S$GLB,,,

## 2022-10-13 PROCEDURE — 93793 ANTICOAG MGMT PT WARFARIN: CPT | Mod: S$GLB,,,

## 2022-10-15 DIAGNOSIS — D64.9 ANEMIA, UNSPECIFIED TYPE: Primary | ICD-10-CM

## 2022-10-17 ENCOUNTER — ANTI-COAG VISIT (OUTPATIENT)
Dept: CARDIOLOGY | Facility: CLINIC | Age: 67
End: 2022-10-17
Payer: MEDICARE

## 2022-10-17 DIAGNOSIS — Z95.811 LVAD (LEFT VENTRICULAR ASSIST DEVICE) PRESENT: Primary | ICD-10-CM

## 2022-10-17 LAB — INR PPP: 2.2

## 2022-10-17 PROCEDURE — 93793 PR ANTICOAGULANT MGMT FOR PT TAKING WARFARIN: ICD-10-PCS | Mod: S$GLB,,,

## 2022-10-17 PROCEDURE — 93793 ANTICOAG MGMT PT WARFARIN: CPT | Mod: S$GLB,,,

## 2022-10-20 ENCOUNTER — ANTI-COAG VISIT (OUTPATIENT)
Dept: CARDIOLOGY | Facility: CLINIC | Age: 67
End: 2022-10-20
Payer: MEDICARE

## 2022-10-20 DIAGNOSIS — Z95.811 LVAD (LEFT VENTRICULAR ASSIST DEVICE) PRESENT: Primary | ICD-10-CM

## 2022-10-20 LAB — INR PPP: 3.1

## 2022-10-20 PROCEDURE — 93793 ANTICOAG MGMT PT WARFARIN: CPT | Mod: S$GLB,,,

## 2022-10-20 PROCEDURE — 93793 PR ANTICOAGULANT MGMT FOR PT TAKING WARFARIN: ICD-10-PCS | Mod: S$GLB,,,

## 2022-10-20 NOTE — PROGRESS NOTES
Confirmed taking incorrect dose w/ 2.5mg on TUES & WED; all other days pt reports taking 5mg  Consistent w/ Boost intake  NO other new changes

## 2022-10-25 ENCOUNTER — ANTI-COAG VISIT (OUTPATIENT)
Dept: CARDIOLOGY | Facility: CLINIC | Age: 67
End: 2022-10-25
Payer: MEDICARE

## 2022-10-25 DIAGNOSIS — Z95.811 LVAD (LEFT VENTRICULAR ASSIST DEVICE) PRESENT: Primary | ICD-10-CM

## 2022-10-25 LAB — INR PPP: 2.2

## 2022-10-25 PROCEDURE — 93793 PR ANTICOAGULANT MGMT FOR PT TAKING WARFARIN: ICD-10-PCS | Mod: S$GLB,,,

## 2022-10-25 PROCEDURE — 93793 ANTICOAG MGMT PT WARFARIN: CPT | Mod: S$GLB,,,

## 2022-10-31 ENCOUNTER — ANTI-COAG VISIT (OUTPATIENT)
Dept: CARDIOLOGY | Facility: CLINIC | Age: 67
End: 2022-10-31
Payer: MEDICARE

## 2022-10-31 DIAGNOSIS — Z95.811 LVAD (LEFT VENTRICULAR ASSIST DEVICE) PRESENT: Primary | ICD-10-CM

## 2022-10-31 LAB — INR PPP: 2.5

## 2022-10-31 PROCEDURE — 93793 ANTICOAG MGMT PT WARFARIN: CPT | Mod: S$GLB,,,

## 2022-10-31 PROCEDURE — 93793 PR ANTICOAGULANT MGMT FOR PT TAKING WARFARIN: ICD-10-PCS | Mod: S$GLB,,,

## 2022-11-03 ENCOUNTER — PATIENT MESSAGE (OUTPATIENT)
Dept: TRANSPLANT | Facility: CLINIC | Age: 67
End: 2022-11-03
Payer: MEDICARE

## 2022-11-07 ENCOUNTER — ANTI-COAG VISIT (OUTPATIENT)
Dept: CARDIOLOGY | Facility: CLINIC | Age: 67
End: 2022-11-07
Payer: MEDICARE

## 2022-11-07 DIAGNOSIS — Z95.811 LVAD (LEFT VENTRICULAR ASSIST DEVICE) PRESENT: Primary | ICD-10-CM

## 2022-11-07 LAB — INR PPP: 2.1

## 2022-11-07 PROCEDURE — 93793 PR ANTICOAGULANT MGMT FOR PT TAKING WARFARIN: ICD-10-PCS | Mod: S$GLB,,,

## 2022-11-07 PROCEDURE — 93793 ANTICOAG MGMT PT WARFARIN: CPT | Mod: S$GLB,,,

## 2022-11-14 ENCOUNTER — ANTI-COAG VISIT (OUTPATIENT)
Dept: CARDIOLOGY | Facility: CLINIC | Age: 67
End: 2022-11-14
Payer: MEDICARE

## 2022-11-14 DIAGNOSIS — Z95.811 LVAD (LEFT VENTRICULAR ASSIST DEVICE) PRESENT: Primary | ICD-10-CM

## 2022-11-14 LAB — INR PPP: 3.6

## 2022-11-14 PROCEDURE — 93793 ANTICOAG MGMT PT WARFARIN: CPT | Mod: S$GLB,,,

## 2022-11-14 PROCEDURE — 93793 PR ANTICOAGULANT MGMT FOR PT TAKING WARFARIN: ICD-10-PCS | Mod: S$GLB,,,

## 2022-11-17 ENCOUNTER — ANTI-COAG VISIT (OUTPATIENT)
Dept: CARDIOLOGY | Facility: CLINIC | Age: 67
End: 2022-11-17
Payer: MEDICARE

## 2022-11-17 DIAGNOSIS — Z95.811 LVAD (LEFT VENTRICULAR ASSIST DEVICE) PRESENT: Primary | ICD-10-CM

## 2022-11-17 LAB — INR PPP: 2.8

## 2022-11-17 PROCEDURE — 93793 ANTICOAG MGMT PT WARFARIN: CPT | Mod: S$GLB,,,

## 2022-11-17 PROCEDURE — 93793 PR ANTICOAGULANT MGMT FOR PT TAKING WARFARIN: ICD-10-PCS | Mod: S$GLB,,,

## 2022-11-17 NOTE — PROGRESS NOTES
Patient stated he took Warfarin 2.5mg on 11/15/22, took correct dose all other days, finished Doxycycline 100mg on 11/17/22, no other changes noted

## 2022-11-18 ENCOUNTER — PATIENT MESSAGE (OUTPATIENT)
Dept: CARDIOTHORACIC SURGERY | Facility: CLINIC | Age: 67
End: 2022-11-18
Payer: MEDICARE

## 2022-11-18 DIAGNOSIS — Z95.811 LVAD (LEFT VENTRICULAR ASSIST DEVICE) PRESENT: Primary | ICD-10-CM

## 2022-11-21 ENCOUNTER — ANTI-COAG VISIT (OUTPATIENT)
Dept: CARDIOLOGY | Facility: CLINIC | Age: 67
End: 2022-11-21
Payer: MEDICARE

## 2022-11-21 DIAGNOSIS — Z95.811 LVAD (LEFT VENTRICULAR ASSIST DEVICE) PRESENT: Primary | ICD-10-CM

## 2022-11-21 LAB — INR PPP: 2.7

## 2022-11-21 PROCEDURE — 93793 ANTICOAG MGMT PT WARFARIN: CPT | Mod: S$GLB,,,

## 2022-11-21 PROCEDURE — 93793 PR ANTICOAGULANT MGMT FOR PT TAKING WARFARIN: ICD-10-PCS | Mod: S$GLB,,,

## 2022-11-28 ENCOUNTER — ANTI-COAG VISIT (OUTPATIENT)
Dept: CARDIOLOGY | Facility: CLINIC | Age: 67
End: 2022-11-28
Payer: MEDICARE

## 2022-11-28 DIAGNOSIS — Z95.811 LVAD (LEFT VENTRICULAR ASSIST DEVICE) PRESENT: Primary | ICD-10-CM

## 2022-11-28 LAB — INR PPP: 2.5

## 2022-11-28 PROCEDURE — 93793 PR ANTICOAGULANT MGMT FOR PT TAKING WARFARIN: ICD-10-PCS | Mod: S$GLB,,,

## 2022-11-28 PROCEDURE — 93793 ANTICOAG MGMT PT WARFARIN: CPT | Mod: S$GLB,,,

## 2022-12-01 ENCOUNTER — ANTI-COAG VISIT (OUTPATIENT)
Dept: CARDIOLOGY | Facility: CLINIC | Age: 67
End: 2022-12-01
Payer: MEDICARE

## 2022-12-01 DIAGNOSIS — Z95.811 LVAD (LEFT VENTRICULAR ASSIST DEVICE) PRESENT: Primary | ICD-10-CM

## 2022-12-01 LAB — INR PPP: 2.1

## 2022-12-01 PROCEDURE — 93793 ANTICOAG MGMT PT WARFARIN: CPT | Mod: S$GLB,,,

## 2022-12-01 PROCEDURE — 93793 PR ANTICOAGULANT MGMT FOR PT TAKING WARFARIN: ICD-10-PCS | Mod: S$GLB,,,

## 2022-12-06 ENCOUNTER — ANTI-COAG VISIT (OUTPATIENT)
Dept: CARDIOLOGY | Facility: CLINIC | Age: 67
End: 2022-12-06
Payer: MEDICARE

## 2022-12-06 DIAGNOSIS — Z95.811 LVAD (LEFT VENTRICULAR ASSIST DEVICE) PRESENT: Primary | ICD-10-CM

## 2022-12-06 LAB — INR PPP: 1.5

## 2022-12-06 PROCEDURE — 93793 ANTICOAG MGMT PT WARFARIN: CPT | Mod: S$GLB,,,

## 2022-12-06 PROCEDURE — 93793 PR ANTICOAGULANT MGMT FOR PT TAKING WARFARIN: ICD-10-PCS | Mod: S$GLB,,,

## 2022-12-06 NOTE — PROGRESS NOTES
Meme reports correct Warfarin dose, patient stated he had peanut butter 12/3/22, protein drink 12/3/22 and 12/4/22, wine cooler 12/4/22, and decreased appetite, no other changes noted.

## 2022-12-08 ENCOUNTER — ANTI-COAG VISIT (OUTPATIENT)
Dept: CARDIOLOGY | Facility: CLINIC | Age: 67
End: 2022-12-08
Payer: MEDICARE

## 2022-12-08 DIAGNOSIS — Z95.811 LVAD (LEFT VENTRICULAR ASSIST DEVICE) PRESENT: Primary | ICD-10-CM

## 2022-12-08 LAB — INR PPP: 1.8

## 2022-12-08 PROCEDURE — 93793 ANTICOAG MGMT PT WARFARIN: CPT | Mod: S$GLB,,,

## 2022-12-08 PROCEDURE — 93793 PR ANTICOAGULANT MGMT FOR PT TAKING WARFARIN: ICD-10-PCS | Mod: S$GLB,,,

## 2022-12-12 ENCOUNTER — ANTI-COAG VISIT (OUTPATIENT)
Dept: CARDIOLOGY | Facility: CLINIC | Age: 67
End: 2022-12-12
Payer: MEDICARE

## 2022-12-12 DIAGNOSIS — Z95.811 LVAD (LEFT VENTRICULAR ASSIST DEVICE) PRESENT: Primary | ICD-10-CM

## 2022-12-12 LAB — INR PPP: 1.6

## 2022-12-12 PROCEDURE — 93793 PR ANTICOAGULANT MGMT FOR PT TAKING WARFARIN: ICD-10-PCS | Mod: S$GLB,,,

## 2022-12-12 PROCEDURE — 93793 ANTICOAG MGMT PT WARFARIN: CPT | Mod: S$GLB,,,

## 2022-12-12 NOTE — PROGRESS NOTES
Meme reports correct Warfarin dose, stated patient's Warfarin pill is peach, no other changes noted.

## 2022-12-15 ENCOUNTER — ANTI-COAG VISIT (OUTPATIENT)
Dept: CARDIOLOGY | Facility: CLINIC | Age: 67
End: 2022-12-15
Payer: MEDICARE

## 2022-12-15 DIAGNOSIS — Z95.811 LVAD (LEFT VENTRICULAR ASSIST DEVICE) PRESENT: Primary | ICD-10-CM

## 2022-12-15 LAB — INR PPP: 1.8

## 2022-12-15 PROCEDURE — 93793 PR ANTICOAGULANT MGMT FOR PT TAKING WARFARIN: ICD-10-PCS | Mod: S$GLB,,,

## 2022-12-15 PROCEDURE — 93793 ANTICOAG MGMT PT WARFARIN: CPT | Mod: S$GLB,,,

## 2022-12-15 NOTE — PROGRESS NOTES
Patient called and was given lab result, he verified correct warfarin dose, reports no other changes

## 2022-12-15 NOTE — PROGRESS NOTES
INR not at goal but did improve. Medications, chart, and patient findings reviewed. See calendar for adjustments to dose and follow up plan.

## 2022-12-19 ENCOUNTER — ANTI-COAG VISIT (OUTPATIENT)
Dept: CARDIOLOGY | Facility: CLINIC | Age: 67
End: 2022-12-19
Payer: MEDICARE

## 2022-12-19 DIAGNOSIS — Z95.811 LVAD (LEFT VENTRICULAR ASSIST DEVICE) PRESENT: Primary | ICD-10-CM

## 2022-12-19 LAB — INR PPP: 3

## 2022-12-19 PROCEDURE — 93793 PR ANTICOAGULANT MGMT FOR PT TAKING WARFARIN: ICD-10-PCS | Mod: S$GLB,,,

## 2022-12-19 PROCEDURE — 93793 ANTICOAG MGMT PT WARFARIN: CPT | Mod: S$GLB,,,

## 2022-12-21 ENCOUNTER — ANTI-COAG VISIT (OUTPATIENT)
Dept: CARDIOLOGY | Facility: CLINIC | Age: 67
End: 2022-12-21
Payer: MEDICARE

## 2022-12-21 DIAGNOSIS — Z95.811 LVAD (LEFT VENTRICULAR ASSIST DEVICE) PRESENT: Primary | ICD-10-CM

## 2022-12-21 LAB — INR PPP: 2.4

## 2022-12-21 PROCEDURE — 93793 ANTICOAG MGMT PT WARFARIN: CPT | Mod: S$GLB,,,

## 2022-12-21 PROCEDURE — 93793 PR ANTICOAGULANT MGMT FOR PT TAKING WARFARIN: ICD-10-PCS | Mod: S$GLB,,,

## 2022-12-21 NOTE — PROGRESS NOTES
Patient called and he was given lab result, he verified correct warfarin dose, reports no changes, Patient was advised of coumadin instructions and redraw date, he verbalized understanding

## 2022-12-27 ENCOUNTER — ANTI-COAG VISIT (OUTPATIENT)
Dept: CARDIOLOGY | Facility: CLINIC | Age: 67
End: 2022-12-27
Payer: MEDICARE

## 2022-12-27 DIAGNOSIS — Z95.811 LVAD (LEFT VENTRICULAR ASSIST DEVICE) PRESENT: Primary | ICD-10-CM

## 2022-12-27 LAB — INR PPP: 5.4

## 2022-12-27 PROCEDURE — 93793 PR ANTICOAGULANT MGMT FOR PT TAKING WARFARIN: ICD-10-PCS | Mod: S$GLB,,,

## 2022-12-27 PROCEDURE — 93793 ANTICOAG MGMT PT WARFARIN: CPT | Mod: S$GLB,,,

## 2022-12-27 NOTE — PROGRESS NOTES
Verbal result taken from Barbara, with the [facility]. PT/INR -  48.7 / 5.4 - Date drawn, 12/27/22.   Hardcopy to be faxed.     - Patient questioned regarding elevated INR. Patient confirmed proper warfarin dosing. Patient reported having 1 beer on 12/25, and denied any other changes in medications, diet, and/or health. S/sx of bleeding unreported.

## 2022-12-28 ENCOUNTER — ANTI-COAG VISIT (OUTPATIENT)
Dept: CARDIOLOGY | Facility: CLINIC | Age: 67
End: 2022-12-28
Payer: MEDICARE

## 2022-12-28 DIAGNOSIS — Z95.811 LVAD (LEFT VENTRICULAR ASSIST DEVICE) PRESENT: Primary | ICD-10-CM

## 2022-12-28 LAB — INR PPP: 4.1

## 2022-12-28 PROCEDURE — 93793 ANTICOAG MGMT PT WARFARIN: CPT | Mod: S$GLB,,,

## 2022-12-28 PROCEDURE — 93793 PR ANTICOAGULANT MGMT FOR PT TAKING WARFARIN: ICD-10-PCS | Mod: S$GLB,,,

## 2022-12-28 NOTE — PROGRESS NOTES
Verbal result taken from [facility] lab. PT/INR [37.9/ 4.1]. Date drawn 12/28/22. Hardcopy to be faxed.

## 2022-12-30 ENCOUNTER — ANTI-COAG VISIT (OUTPATIENT)
Dept: CARDIOLOGY | Facility: CLINIC | Age: 67
End: 2022-12-30
Payer: MEDICARE

## 2022-12-30 DIAGNOSIS — Z95.811 LVAD (LEFT VENTRICULAR ASSIST DEVICE) PRESENT: Primary | ICD-10-CM

## 2022-12-30 LAB — INR PPP: 2.1

## 2022-12-30 PROCEDURE — 93793 PR ANTICOAGULANT MGMT FOR PT TAKING WARFARIN: ICD-10-PCS | Mod: S$GLB,,,

## 2022-12-30 PROCEDURE — 93793 ANTICOAG MGMT PT WARFARIN: CPT | Mod: S$GLB,,,

## 2023-01-03 ENCOUNTER — ANTI-COAG VISIT (OUTPATIENT)
Dept: CARDIOLOGY | Facility: CLINIC | Age: 68
End: 2023-01-03
Payer: MEDICARE

## 2023-01-03 DIAGNOSIS — Z95.811 LVAD (LEFT VENTRICULAR ASSIST DEVICE) PRESENT: Primary | ICD-10-CM

## 2023-01-03 LAB — INR PPP: 2.7

## 2023-01-03 PROCEDURE — 93793 ANTICOAG MGMT PT WARFARIN: CPT | Mod: S$GLB,,,

## 2023-01-03 PROCEDURE — 93793 PR ANTICOAGULANT MGMT FOR PT TAKING WARFARIN: ICD-10-PCS | Mod: S$GLB,,,

## 2023-01-06 ENCOUNTER — ANTI-COAG VISIT (OUTPATIENT)
Dept: CARDIOLOGY | Facility: CLINIC | Age: 68
End: 2023-01-06
Payer: MEDICARE

## 2023-01-06 DIAGNOSIS — Z95.811 LVAD (LEFT VENTRICULAR ASSIST DEVICE) PRESENT: Primary | ICD-10-CM

## 2023-01-06 LAB — INR PPP: 3.1

## 2023-01-06 PROCEDURE — 93793 ANTICOAG MGMT PT WARFARIN: CPT | Mod: S$GLB,,,

## 2023-01-06 PROCEDURE — 93793 PR ANTICOAGULANT MGMT FOR PT TAKING WARFARIN: ICD-10-PCS | Mod: S$GLB,,,

## 2023-01-06 NOTE — PROGRESS NOTES
INR not at goal. Medications, chart, and patient findings reviewed. See calendar for adjustments to dose and follow up plan. VAD coordinator, ROCHELLE Renner, notified of headache. Has prior ICH history after a fall & INRs have been running high.

## 2023-01-06 NOTE — PROGRESS NOTES
Patient reports correct Warfarin dose, stated his Warfarin pill is peach, headache 1/5/23 that subsided overnight and restarted today, didn't take any medication for the headache, no other changes noted.

## 2023-01-10 ENCOUNTER — ANTI-COAG VISIT (OUTPATIENT)
Dept: CARDIOLOGY | Facility: CLINIC | Age: 68
End: 2023-01-10
Payer: MEDICARE

## 2023-01-10 DIAGNOSIS — Z95.811 LVAD (LEFT VENTRICULAR ASSIST DEVICE) PRESENT: Primary | ICD-10-CM

## 2023-01-10 LAB — INR PPP: 2

## 2023-01-10 PROCEDURE — 93793 ANTICOAG MGMT PT WARFARIN: CPT | Mod: S$GLB,,,

## 2023-01-10 PROCEDURE — 93793 PR ANTICOAGULANT MGMT FOR PT TAKING WARFARIN: ICD-10-PCS | Mod: S$GLB,,,

## 2023-01-12 ENCOUNTER — ANTI-COAG VISIT (OUTPATIENT)
Dept: CARDIOLOGY | Facility: CLINIC | Age: 68
End: 2023-01-12
Payer: MEDICARE

## 2023-01-12 DIAGNOSIS — Z95.811 LVAD (LEFT VENTRICULAR ASSIST DEVICE) PRESENT: Primary | ICD-10-CM

## 2023-01-12 LAB — INR PPP: 2

## 2023-01-12 PROCEDURE — 93793 PR ANTICOAGULANT MGMT FOR PT TAKING WARFARIN: ICD-10-PCS | Mod: S$GLB,,,

## 2023-01-12 PROCEDURE — 93793 ANTICOAG MGMT PT WARFARIN: CPT | Mod: S$GLB,,,

## 2023-01-17 ENCOUNTER — ANTI-COAG VISIT (OUTPATIENT)
Dept: CARDIOLOGY | Facility: CLINIC | Age: 68
End: 2023-01-17
Payer: MEDICARE

## 2023-01-17 DIAGNOSIS — Z95.811 LVAD (LEFT VENTRICULAR ASSIST DEVICE) PRESENT: Primary | ICD-10-CM

## 2023-01-17 LAB — INR PPP: 1.8

## 2023-01-17 PROCEDURE — 93793 PR ANTICOAGULANT MGMT FOR PT TAKING WARFARIN: ICD-10-PCS | Mod: S$GLB,,,

## 2023-01-17 PROCEDURE — 93793 ANTICOAG MGMT PT WARFARIN: CPT | Mod: S$GLB,,,

## 2023-01-19 ENCOUNTER — ANTI-COAG VISIT (OUTPATIENT)
Dept: CARDIOLOGY | Facility: CLINIC | Age: 68
End: 2023-01-19
Payer: MEDICARE

## 2023-01-19 DIAGNOSIS — Z95.811 LVAD (LEFT VENTRICULAR ASSIST DEVICE) PRESENT: Primary | ICD-10-CM

## 2023-01-19 LAB — INR PPP: 2

## 2023-01-19 PROCEDURE — 93793 ANTICOAG MGMT PT WARFARIN: CPT | Mod: S$GLB,,,

## 2023-01-19 PROCEDURE — 93793 PR ANTICOAGULANT MGMT FOR PT TAKING WARFARIN: ICD-10-PCS | Mod: S$GLB,,,

## 2023-01-20 ENCOUNTER — TELEPHONE (OUTPATIENT)
Dept: TRANSPLANT | Facility: CLINIC | Age: 68
End: 2023-01-20
Payer: MEDICARE

## 2023-01-20 NOTE — TELEPHONE ENCOUNTER
Spoke with caregiver regarding equipment maintenance due at upcoming clinic appointment on 1/26/2023.  Asked caregiver to bring MPU with them to next appointment for maintenance.  Verbalized understanding and agreement.    It is medically necessary to ensure patient has properly functioning equipment and wearables to prevent infection, injury or death to patient.

## 2023-01-24 ENCOUNTER — ANTI-COAG VISIT (OUTPATIENT)
Dept: CARDIOLOGY | Facility: CLINIC | Age: 68
End: 2023-01-24
Payer: MEDICARE

## 2023-01-24 DIAGNOSIS — Z95.811 LVAD (LEFT VENTRICULAR ASSIST DEVICE) PRESENT: Primary | ICD-10-CM

## 2023-01-24 LAB — INR PPP: 2.5

## 2023-01-24 PROCEDURE — 93793 ANTICOAG MGMT PT WARFARIN: CPT | Mod: S$GLB,,,

## 2023-01-24 PROCEDURE — 93793 PR ANTICOAGULANT MGMT FOR PT TAKING WARFARIN: ICD-10-PCS | Mod: S$GLB,,,

## 2023-01-26 ENCOUNTER — DOCUMENTATION ONLY (OUTPATIENT)
Dept: CARDIOTHORACIC SURGERY | Facility: CLINIC | Age: 68
End: 2023-01-26
Payer: MEDICARE

## 2023-01-26 ENCOUNTER — LAB VISIT (OUTPATIENT)
Dept: LAB | Facility: HOSPITAL | Age: 68
End: 2023-01-26
Attending: INTERNAL MEDICINE
Payer: MEDICARE

## 2023-01-26 ENCOUNTER — TELEPHONE (OUTPATIENT)
Dept: PALLIATIVE MEDICINE | Facility: CLINIC | Age: 68
End: 2023-01-26
Payer: MEDICARE

## 2023-01-26 ENCOUNTER — OFFICE VISIT (OUTPATIENT)
Dept: TRANSPLANT | Facility: CLINIC | Age: 68
End: 2023-01-26
Attending: INTERNAL MEDICINE
Payer: MEDICARE

## 2023-01-26 ENCOUNTER — ANTI-COAG VISIT (OUTPATIENT)
Dept: CARDIOLOGY | Facility: CLINIC | Age: 68
End: 2023-01-26
Payer: MEDICARE

## 2023-01-26 ENCOUNTER — DOCUMENTATION ONLY (OUTPATIENT)
Dept: TRANSPLANT | Facility: CLINIC | Age: 68
End: 2023-01-26
Payer: MEDICARE

## 2023-01-26 ENCOUNTER — CLINICAL SUPPORT (OUTPATIENT)
Dept: TRANSPLANT | Facility: CLINIC | Age: 68
End: 2023-01-26
Payer: MEDICARE

## 2023-01-26 VITALS — TEMPERATURE: 98 F | WEIGHT: 151 LBS | SYSTOLIC BLOOD PRESSURE: 72 MMHG | BODY MASS INDEX: 19.38 KG/M2 | HEIGHT: 74 IN

## 2023-01-26 DIAGNOSIS — I50.42 CHRONIC COMBINED SYSTOLIC AND DIASTOLIC HEART FAILURE: ICD-10-CM

## 2023-01-26 DIAGNOSIS — I42.8 NICM (NONISCHEMIC CARDIOMYOPATHY): ICD-10-CM

## 2023-01-26 DIAGNOSIS — Z95.811 LVAD (LEFT VENTRICULAR ASSIST DEVICE) PRESENT: Primary | ICD-10-CM

## 2023-01-26 DIAGNOSIS — Z95.811 HEART REPLACED BY HEART ASSIST DEVICE: ICD-10-CM

## 2023-01-26 DIAGNOSIS — R63.4 WEIGHT LOSS OF MORE THAN 10% BODY WEIGHT: ICD-10-CM

## 2023-01-26 DIAGNOSIS — I10 ESSENTIAL HYPERTENSION: ICD-10-CM

## 2023-01-26 DIAGNOSIS — Z95.810 ICD (IMPLANTABLE CARDIOVERTER-DEFIBRILLATOR) IN PLACE: ICD-10-CM

## 2023-01-26 LAB
ALBUMIN SERPL BCP-MCNC: 2.6 G/DL (ref 3.5–5.2)
ALP SERPL-CCNC: 116 U/L (ref 55–135)
ALT SERPL W/O P-5'-P-CCNC: 5 U/L (ref 10–44)
ANION GAP SERPL CALC-SCNC: 12 MMOL/L (ref 8–16)
AST SERPL-CCNC: 12 U/L (ref 10–40)
BASOPHILS # BLD AUTO: 0.03 K/UL (ref 0–0.2)
BASOPHILS NFR BLD: 0.2 % (ref 0–1.9)
BILIRUB DIRECT SERPL-MCNC: 0.4 MG/DL (ref 0.1–0.3)
BILIRUB SERPL-MCNC: 1.1 MG/DL (ref 0.1–1)
BNP SERPL-MCNC: 122 PG/ML (ref 0–99)
BUN SERPL-MCNC: 22 MG/DL (ref 8–23)
CALCIUM SERPL-MCNC: 9.6 MG/DL (ref 8.7–10.5)
CHLORIDE SERPL-SCNC: 100 MMOL/L (ref 95–110)
CO2 SERPL-SCNC: 25 MMOL/L (ref 23–29)
CREAT SERPL-MCNC: 1 MG/DL (ref 0.5–1.4)
CRP SERPL-MCNC: 100 MG/L (ref 0–8.2)
DIFFERENTIAL METHOD: ABNORMAL
EOSINOPHIL # BLD AUTO: 0.1 K/UL (ref 0–0.5)
EOSINOPHIL NFR BLD: 0.5 % (ref 0–8)
ERYTHROCYTE [DISTWIDTH] IN BLOOD BY AUTOMATED COUNT: 15.9 % (ref 11.5–14.5)
EST. GFR  (NO RACE VARIABLE): >60 ML/MIN/1.73 M^2
GLUCOSE SERPL-MCNC: 130 MG/DL (ref 70–110)
HCT VFR BLD AUTO: 31.6 % (ref 40–54)
HGB BLD-MCNC: 9.5 G/DL (ref 14–18)
IMM GRANULOCYTES # BLD AUTO: 0.06 K/UL (ref 0–0.04)
IMM GRANULOCYTES NFR BLD AUTO: 0.5 % (ref 0–0.5)
INR PPP: 2.7 (ref 0.8–1.2)
LDH SERPL L TO P-CCNC: 140 U/L (ref 110–260)
LYMPHOCYTES # BLD AUTO: 1.5 K/UL (ref 1–4.8)
LYMPHOCYTES NFR BLD: 11.3 % (ref 18–48)
MAGNESIUM SERPL-MCNC: 2.1 MG/DL (ref 1.6–2.6)
MCH RBC QN AUTO: 26 PG (ref 27–31)
MCHC RBC AUTO-ENTMCNC: 30.1 G/DL (ref 32–36)
MCV RBC AUTO: 87 FL (ref 82–98)
MONOCYTES # BLD AUTO: 1.1 K/UL (ref 0.3–1)
MONOCYTES NFR BLD: 8.1 % (ref 4–15)
NEUTROPHILS # BLD AUTO: 10.5 K/UL (ref 1.8–7.7)
NEUTROPHILS NFR BLD: 79.4 % (ref 38–73)
NRBC BLD-RTO: 0 /100 WBC
PHOSPHATE SERPL-MCNC: 3 MG/DL (ref 2.7–4.5)
PLATELET # BLD AUTO: 249 K/UL (ref 150–450)
PMV BLD AUTO: 10.1 FL (ref 9.2–12.9)
POTASSIUM SERPL-SCNC: 3.8 MMOL/L (ref 3.5–5.1)
PREALB SERPL-MCNC: 8 MG/DL (ref 20–43)
PROT SERPL-MCNC: 10.6 G/DL (ref 6–8.4)
PROTHROMBIN TIME: 26.6 SEC (ref 9–12.5)
RBC # BLD AUTO: 3.65 M/UL (ref 4.6–6.2)
SODIUM SERPL-SCNC: 137 MMOL/L (ref 136–145)
WBC # BLD AUTO: 13.22 K/UL (ref 3.9–12.7)

## 2023-01-26 PROCEDURE — 84100 ASSAY OF PHOSPHORUS: CPT | Performed by: INTERNAL MEDICINE

## 2023-01-26 PROCEDURE — 93750 INTERROGATION VAD IN PERSON: CPT | Mod: S$GLB,,, | Performed by: INTERNAL MEDICINE

## 2023-01-26 PROCEDURE — 99215 OFFICE O/P EST HI 40 MIN: CPT | Mod: S$GLB,,, | Performed by: INTERNAL MEDICINE

## 2023-01-26 PROCEDURE — 85610 PROTHROMBIN TIME: CPT | Performed by: INTERNAL MEDICINE

## 2023-01-26 PROCEDURE — 3288F PR FALLS RISK ASSESSMENT DOCUMENTED: ICD-10-PCS | Mod: CPTII,S$GLB,, | Performed by: INTERNAL MEDICINE

## 2023-01-26 PROCEDURE — 1160F PR REVIEW ALL MEDS BY PRESCRIBER/CLIN PHARMACIST DOCUMENTED: ICD-10-PCS | Mod: CPTII,S$GLB,, | Performed by: INTERNAL MEDICINE

## 2023-01-26 PROCEDURE — 3008F BODY MASS INDEX DOCD: CPT | Mod: CPTII,S$GLB,, | Performed by: INTERNAL MEDICINE

## 2023-01-26 PROCEDURE — 3288F FALL RISK ASSESSMENT DOCD: CPT | Mod: CPTII,S$GLB,, | Performed by: INTERNAL MEDICINE

## 2023-01-26 PROCEDURE — 1159F PR MEDICATION LIST DOCUMENTED IN MEDICAL RECORD: ICD-10-PCS | Mod: CPTII,S$GLB,, | Performed by: INTERNAL MEDICINE

## 2023-01-26 PROCEDURE — 1101F PT FALLS ASSESS-DOCD LE1/YR: CPT | Mod: CPTII,S$GLB,, | Performed by: INTERNAL MEDICINE

## 2023-01-26 PROCEDURE — 1101F PR PT FALLS ASSESS DOC 0-1 FALLS W/OUT INJ PAST YR: ICD-10-PCS | Mod: CPTII,S$GLB,, | Performed by: INTERNAL MEDICINE

## 2023-01-26 PROCEDURE — 1157F PR ADVANCE CARE PLAN OR EQUIV PRESENT IN MEDICAL RECORD: ICD-10-PCS | Mod: CPTII,S$GLB,, | Performed by: INTERNAL MEDICINE

## 2023-01-26 PROCEDURE — 83615 LACTATE (LD) (LDH) ENZYME: CPT | Performed by: INTERNAL MEDICINE

## 2023-01-26 PROCEDURE — 36415 COLL VENOUS BLD VENIPUNCTURE: CPT | Performed by: INTERNAL MEDICINE

## 2023-01-26 PROCEDURE — 99999 PR PBB SHADOW E&M-EST. PATIENT-LVL IV: CPT | Mod: PBBFAC,,, | Performed by: INTERNAL MEDICINE

## 2023-01-26 PROCEDURE — 93750 PR INTERROGATE VENT ASSIST DEV, IN PERSON, W PHYSICIAN ANALYSIS: ICD-10-PCS | Mod: S$GLB,,, | Performed by: INTERNAL MEDICINE

## 2023-01-26 PROCEDURE — 86140 C-REACTIVE PROTEIN: CPT | Performed by: INTERNAL MEDICINE

## 2023-01-26 PROCEDURE — 1157F ADVNC CARE PLAN IN RCRD: CPT | Mod: CPTII,S$GLB,, | Performed by: INTERNAL MEDICINE

## 2023-01-26 PROCEDURE — 93750 OP LVAD INTERROGATION: ICD-10-PCS | Mod: S$GLB,,, | Performed by: INTERNAL MEDICINE

## 2023-01-26 PROCEDURE — 1126F AMNT PAIN NOTED NONE PRSNT: CPT | Mod: CPTII,S$GLB,, | Performed by: INTERNAL MEDICINE

## 2023-01-26 PROCEDURE — 85025 COMPLETE CBC W/AUTO DIFF WBC: CPT | Performed by: INTERNAL MEDICINE

## 2023-01-26 PROCEDURE — 1160F RVW MEDS BY RX/DR IN RCRD: CPT | Mod: CPTII,S$GLB,, | Performed by: INTERNAL MEDICINE

## 2023-01-26 PROCEDURE — 99215 PR OFFICE/OUTPT VISIT, EST, LEVL V, 40-54 MIN: ICD-10-PCS | Mod: S$GLB,,, | Performed by: INTERNAL MEDICINE

## 2023-01-26 PROCEDURE — 83880 ASSAY OF NATRIURETIC PEPTIDE: CPT | Performed by: INTERNAL MEDICINE

## 2023-01-26 PROCEDURE — 99999 PR PBB SHADOW E&M-EST. PATIENT-LVL IV: ICD-10-PCS | Mod: PBBFAC,,, | Performed by: INTERNAL MEDICINE

## 2023-01-26 PROCEDURE — 83735 ASSAY OF MAGNESIUM: CPT | Performed by: INTERNAL MEDICINE

## 2023-01-26 PROCEDURE — 84134 ASSAY OF PREALBUMIN: CPT | Performed by: INTERNAL MEDICINE

## 2023-01-26 PROCEDURE — 3008F PR BODY MASS INDEX (BMI) DOCUMENTED: ICD-10-PCS | Mod: CPTII,S$GLB,, | Performed by: INTERNAL MEDICINE

## 2023-01-26 PROCEDURE — 82248 BILIRUBIN DIRECT: CPT | Performed by: INTERNAL MEDICINE

## 2023-01-26 PROCEDURE — 1126F PR PAIN SEVERITY QUANTIFIED, NO PAIN PRESENT: ICD-10-PCS | Mod: CPTII,S$GLB,, | Performed by: INTERNAL MEDICINE

## 2023-01-26 PROCEDURE — 80053 COMPREHEN METABOLIC PANEL: CPT | Performed by: INTERNAL MEDICINE

## 2023-01-26 PROCEDURE — 1159F MED LIST DOCD IN RCRD: CPT | Mod: CPTII,S$GLB,, | Performed by: INTERNAL MEDICINE

## 2023-01-26 NOTE — LETTER
January 26, 2023        Teresa Mendoza  [address]  [address]  [place] [facility] [address]  Phone: [phone]  Fax: [phone]             [id number]  [address]  [place] [address]  Phone: [phone]   Patient: Rocco France   MR Number: [id number]   YOB: 1955   Date of Visit: 1/26/2023       Dear Dr. Teresa Mendoza    Thank you for referring Rocco France to me for evaluation. Attached you will find relevant portions of my assessment and plan of care.    If you have questions, please do not hesitate to call me. I look forward to following Rocco France along with you.    Sincerely,    Manuel Ramos Jr, MD    Enclosure    If you would like to receive this communication electronically, please contact [email or web address] or [phone] to request "[employer] Link access.    "[employer] Link is a tool which provides read-only access to select patient information with whom you have a relationship. Its easy to use and provides real time access to review your patients record including encounter summaries, notes, results, and demographic information.    If you feel you have received this communication in error or would no longer like to receive these types of communications, please e-mail [email or web address]

## 2023-01-26 NOTE — PROGRESS NOTES
Patient reports correct Warfarin dose, is having colonoscopy and not sure when and will call us with the date, stated his PCP prescribed an antibiotic that is expensive and is calling to see if she will prescribe a different one and will let us know the name of the antibiotic, no other changes noted.

## 2023-01-26 NOTE — PROGRESS NOTES
Date of Implant with Heartmate 3 LVAD: 1/13/21    PATIENT ARRIVED IN CLINIC:  Ambulatory   Accompanied by: spouse  Complaints/reason for visit today: routine    Vitals  Temperature, oral:   Temp Readings from Last 1 Encounters:   01/26/23 97.9 °F (36.6 °C) (Oral)     Blood Pressure:   BP Readings from Last 3 Encounters:   01/26/23 (!) 72/0   09/16/22 (!) 102/0   09/14/22 (!) 90/0        VAD Interrogation:  TXP JCAEY INTERROGATIONS 1/26/2023 9/16/2022 9/16/2022   Type HeartMate3 - -   Flow 4.4 - -   Speed 5200 - -   PI 5.8 - -   Power (Edwards) 3.8 - -   LSL 4800 - -   Pulsatility No Pulse Pulse Pulse     HCT:   Lab Results   Component Value Date    HCT 31.6 (L) 01/26/2023    HCT 30 (L) 03/22/2022       Problems / Issues / Alarms with VAD if any: None noted  VAD Sounds: HM3 Smooth    Equipment:  Emergency Equipment With Patient: yes   Any Equipment Issues: None noted   It is medically necessary to ensure patient has properly functioning equipment and wearables to prevent infection, injury or death to patient.     DLES Assessment:  Appearance Of Driveline: 1  Antibiotics: NO  Velour: no  Manual & Visual Inspection Of Driveline: No kinks or tears noted  Stabilization Device In Use: yes, [place] securement device    Heartmate 3 Module Cable:  No yellow exposed and Attempted to unscrew modular cable to ensure it will be able to come lose in the event we ever need to change the modular cable while patient held the driveline in place so it would not move. Modular cable connection able to be unscrewed and re-tightened. Instructed pt to perform this weekly.    Patient MyChart Questionnaire: No flowsheet data found.     Assessment:   PAIN: NO  Complaints Of Nausea / Vomiting: None noted    Appearance and Frequency Of Stools: normal and formed without blood & daily  Color Of Urine: clear/yellow  Coping/Depression/Anxiety: depressed  Sleep Habits: 5-6 hrs /night  Sleep Aids: None noted  Showering: Yes, reminded to change dressing  immediately after drying off  Activity/Exercise: walking   Driving: Yes. Reminded to pull over should there be an alarm before looking down at controller.    DLES Dressing Care:   Frequency of Dressing Changes: daily & daily kit  Pt In Need Of Management Kits?:yes -   2 Box of daily kit  It is medically necessary to have VAD management kits in order to prevent infection or to assist in the healing of an infected DLES.    Labs:    Chemistry        Component Value Date/Time     09/16/2022 0600    K 4.0 09/16/2022 0600     09/16/2022 0600    CO2 25 09/16/2022 0600    BUN 12 09/16/2022 0600    CREATININE 0.7 09/16/2022 0600     09/16/2022 0600        Component Value Date/Time    CALCIUM 9.3 09/16/2022 0600    ALKPHOS 133 09/16/2022 0600    AST 17 09/16/2022 0600    ALT 10 09/16/2022 0600    BILITOT 1.0 09/16/2022 0600    ESTGFRAFRICA 55.3 (A) 07/13/2022 0834    EGFRNONAA 47.8 (A) 07/13/2022 0834            Magnesium   Date Value Ref Range Status   09/16/2022 1.9 1.6 - 2.6 mg/dL Final       Lab Results   Component Value Date    WBC 13.22 (H) 01/26/2023    HGB 9.5 (L) 01/26/2023    HCT 31.6 (L) 01/26/2023    MCV 87 01/26/2023     01/26/2023       Lab Results   Component Value Date    INR 2.7 (H) 01/26/2023    INR 2.5 01/24/2023    INR 2.0 01/19/2023       BNP   Date Value Ref Range Status   09/16/2022 382 (H) 0 - 99 pg/mL Final     Comment:     Values of less than 100 pg/ml are consistent with non-CHF populations.   09/14/2022 308 (H) 0 - 99 pg/mL Final     Comment:     Values of less than 100 pg/ml are consistent with non-CHF populations.   08/11/2022 248 (H) 0 - 99 pg/mL Final     Comment:     Values of less than 100 pg/ml are consistent with non-CHF populations.       LD   Date Value Ref Range Status   09/16/2022 153 110 - 260 U/L Final     Comment:     Results are increased in hemolyzed samples.   09/15/2022 169 110 - 260 U/L Final     Comment:     Results are increased in hemolyzed samples.    09/14/2022 173 110 - 260 U/L Final     Comment:     Results are increased in hemolyzed samples.       Labs reviewed with patient: YES     Medication Reconciliation: per MA.  New Medication Detail Provided: yes  Coumadin Managed by: [facility]    Education: Reviewed driveline care, emergency procedures, how to change the controller, alarms with patient, as well as discussed how to page the VAD coordinator in case of an emergency.   Educated patient/family that chest compressions are allowed in the event they are needed.    Reminded patient/caregiver not to touch their face and to cover their mouth when they cough or sneeze.   Vaccines: Pt informed that we are encouraging all VAD patients to receive the Flu and COVID vaccines and boosters. Informed pt that they can take tylenol but should avoid other NSAIDs.       Plans/Needs: Routine f/u w/ Dr Ramos. Patient reports feeling depressed, lack of appetite, feels down about not being able to do the things he used to. Discussed seeing palliative care, patient is open to this. Referral placed. Weight loss of 17lbs since last visit. Seen by RD. Referral for GI for colonoscopy. Will repeat iron studies. Add on of TSH/T4    PM on MPU [id number] completed today    RTC 2 months w/ routine FLP       Hurricane Season: No

## 2023-01-27 ENCOUNTER — HOSPITAL ENCOUNTER (OUTPATIENT)
Dept: RADIOLOGY | Facility: HOSPITAL | Age: 68
Discharge: HOME | End: 2023-01-27
Attending: INTERNAL MEDICINE
Payer: MEDICARE

## 2023-01-27 DIAGNOSIS — Z95.811 LVAD (LEFT VENTRICULAR ASSIST DEVICE) PRESENT: ICD-10-CM

## 2023-01-27 DIAGNOSIS — I50.42 CHRONIC COMBINED SYSTOLIC AND DIASTOLIC HEART FAILURE: ICD-10-CM

## 2023-01-27 DIAGNOSIS — R63.4 WEIGHT LOSS OF MORE THAN 10% BODY WEIGHT: ICD-10-CM

## 2023-01-27 PROCEDURE — 25500020 PHARM REV CODE 255: Performed by: INTERNAL MEDICINE

## 2023-01-27 PROCEDURE — 71046 X-RAY EXAM CHEST 2 VIEWS: CPT | Mod: TC

## 2023-01-27 PROCEDURE — 74176 CT ABD & PELVIS W/O CONTRAST: CPT | Mod: TC

## 2023-01-27 RX ADMIN — DIATRIZOATE MEGLUMINE AND DIATRIZOATE SODIUM 15 ML: 660; 100 LIQUID ORAL; RECTAL at 11:01

## 2023-01-27 NOTE — PROCEDURES
[facility] INTERROGATIONS 1/26/2023 9/16/2022 9/16/2022 9/15/2022 9/15/2022 9/15/2022 9/14/2022   Type HeartMate3 - - - - - -   Flow 4.4 - - - - - -   Speed 5200 - - - - - -   PI 5.8 - - - - - -   Power (Edwards) 3.8 - - - - - -   LSL 4800 - - - - - -   Pulsatility No Pulse Pulse Pulse Pulse Pulse Pulse Pulse   }Interrogation of Ventricular assist device was performed with physician analysis of device parameters and review of device function. I have personally reviewed the interrogation findings and agree with findings as stated.

## 2023-01-27 NOTE — PROGRESS NOTES
TXP JACEY INTERROGATIONS 1/26/2023 9/16/2022 9/16/2022 9/15/2022 9/15/2022 9/15/2022 9/14/2022   Type HeartMate3 - - - - - -   Flow 4.4 - - - - - -   Speed 5200 - - - - - -   PI 5.8 - - - - - -   Power (Edwards) 3.8 - - - - - -   LSL 4800 - - - - - -   Pulsatility No Pulse Pulse Pulse Pulse Pulse Pulse Pulse   }  Subjective:   Patient ID:  Rocco France is a 67 y.o. male who presents for LVAD followup visit.    Implant Date: 1/13/2021  Initials: [facility]     Heartmate 3 RPM 5200     INR goal: 2-2.5  Bridge with Heparin   Antiplatelets:  stopped d/t SDH  Inotropes:     GIB:  Integrelin/TPA:  Stroke: 1/27/22: Bilateral Subdural hematoma    TXP JACEY INTERROGATIONS 1/26/2023   Type HeartMate3   Flow 4.4   Speed 5200   PI 5.8   Power (Edwards) 3.8   LSL 4800   Pulsatility No Pulse   Interrogation of Ventricular assist device was performed with physician analysis of device parameters and review of device function. I have personally reviewed the interrogation findings and agree with findings as stated.     HPI  66 yo BM with stage D CHF due to non-ischemic cardiomyopathy status post DT HM3 implantation 1/13/2021 also has PAF, HTN, HLD, DM2.     He was admitted for syncope 1/27/2022 at which time he was found to have a evolving right cerebral acute subdural hematoma and acute basal cistern subarachnoid hemorrhage. He requiring intubation with prolonged hospitalization and was incidentally found to be positive for COVID-19 and treated with remdesivir. He was taken off of aspirin and discharged home 2/17/2022. He was also admitted 3/2022 for 3 days due to dysequilibrium, diplopia and repeat CT head was stable. Neurology recommended myasthenia gravis workup which was negative.     His last admission was September 2022.  He comes today and is noted to be extremely thin.  He lost 17 lb since his last clinic visit in September and 40 lb over the prior 6 months.  He is eating but has early satiety and not eating as much as he was 6 months  "ago.  He had 2 episodes of night sweats 1 about a week ago with the other few months back.  No fever chills.  No other episodes.  No abdominal pain.  No melena.  No bleeding.    Review of Systems   Constitutional: Positive for decreased appetite (early satiety), malaise/fatigue, night sweats and weight loss. Negative for chills and fever.   Cardiovascular:  Positive for dyspnea on exertion. Negative for chest pain, claudication, irregular heartbeat, leg swelling, near-syncope, orthopnea, palpitations, paroxysmal nocturnal dyspnea and syncope.   Respiratory:  Negative for cough, shortness of breath, sputum production and wheezing.    Hematologic/Lymphatic: Negative for bleeding problem. Does not bruise/bleed easily.   Musculoskeletal:  Negative for falls.   Gastrointestinal:  Negative for change in bowel habit, hematemesis, hematochezia and melena.   Genitourinary:  Negative for hematuria.   Neurological:  Positive for dizziness (occ) and light-headedness (occ). Negative for brief paralysis, focal weakness, headaches and weakness.   Psychiatric/Behavioral:  Positive for depression.      Objective:   Blood pressure (!) 72/0, temperature 97.9 °F (36.6 °C), temperature source Oral, height 6' 2" (1.88 m), weight 68.5 kg (151 lb).body mass index is 19.39 kg/m².  Doppler: 72 mm Hg  Physical Exam  Constitutional:       General: He is not in acute distress.     Appearance: He is well-developed. He is ill-appearing. He is not toxic-appearing or diaphoretic.      Comments: BP (!) 72/0 (BP Location: Left arm, Patient Position: Sitting) Comment (BP Method): doppler  Temp 97.9 °F (36.6 °C) (Oral)   Ht 6' 2" (1.88 m)   Wt 68.5 kg (151 lb)   BMI 19.39 kg/m²   Cachectic appearing black male in no acute distress but does appear chronically ill   HENT:      Head: Normocephalic and atraumatic.   Eyes:      General: No scleral icterus.        Right eye: No discharge.         Left eye: No discharge.      Conjunctiva/sclera: " Conjunctivae normal.   Neck:      Thyroid: No thyromegaly.      Vascular: No JVD.      Trachea: No tracheal deviation.   Cardiovascular:      Comments: Normal LVAD sounds; DL 1  Pulmonary:      Effort: Pulmonary effort is normal. No respiratory distress.      Breath sounds: Normal breath sounds. No wheezing or rales.   Abdominal:      General: Bowel sounds are normal. There is no distension.      Palpations: Abdomen is soft. There is no mass.      Tenderness: There is no abdominal tenderness. There is no guarding or rebound.   Musculoskeletal:         General: No tenderness.      Right lower leg: No edema.      Left lower leg: No edema.   Skin:     General: Skin is warm and dry.   Neurological:      General: No focal deficit present.      Mental Status: He is alert. Mental status is at baseline.   Psychiatric:         Mood and Affect: Mood normal.         Behavior: Behavior normal.         Thought Content: Thought content normal.         Judgment: Judgment normal.       Lab Results   Component Value Date     (H) 01/26/2023     01/26/2023    K 3.8 01/26/2023    MG 2.1 01/26/2023     01/26/2023    CO2 25 01/26/2023    PHOS 3.0 01/26/2023    BUN 22 01/26/2023    CREATININE 1.0 01/26/2023     (H) 01/26/2023    HGBA1C 6.5 (H) 09/14/2022    AST 12 01/26/2023    ALT 5 (L) 01/26/2023    ALBUMIN 2.6 (L) 01/26/2023    PROT 10.6 (H) 01/26/2023    BILITOT 1.1 (H) 01/26/2023    WBC 13.22 (H) 01/26/2023    HGB 9.5 (L) 01/26/2023    HCT 31.6 (L) 01/26/2023    HCT 30 (L) 03/22/2022     01/26/2023    INR 2.7 (H) 01/26/2023    INR 2.5 01/24/2023     01/26/2023    TSH 1.420 11/16/2020    CHOL 129 07/13/2022    HDL 47 07/13/2022    LDLCALC 65.2 07/13/2022    TRIG 84 07/13/2022 9/14/2022 ECHO  There is an LVAD present. Base speed is 5100 RPMs. The pump type is a Heartmate III. The interventricular septum appears midline. The aortic valve opens with each cardiac cycle.  The left ventricle is  moderately enlarged with severely decreased systolic function. The estimated ejection fraction is 20%.  Moderate right ventricular enlargement with mildly reduced right ventricular systolic function.  Grade I left ventricular diastolic dysfunction.  Severe biatrial enlargement.  Mild to moderate tricuspid regurgitation.  Mild mitral regurgitation.  The estimated PA systolic pressure is 28 mmHg.  Normal central venous pressure (3 mmHg).       Assessment:      1. LVAD (left ventricular assist device) present    2. Heart replaced by heart assist device    3. Chronic combined systolic and diastolic heart failure    4. NICM (nonischemic cardiomyopathy)    5. Essential hypertension    6. ICD (implantable cardioverter-defibrillator) in place    7. Weight loss of more than 10% body weight    All diagnosis listed above were reviewed with patient re: symptoms,   Plan:   He needs an evaluation for this degree of weight loss.  While he is depressed it sounds as though he is depressed because of his physical condition and I am not sure that is leading to the weight loss or in part feel by the weight loss.  In any event, he needs a colonoscopy, CT scan chest abdomen pelvis, TSH, iron studies.    Palliative care consult    Supplies ordered are medically necessary for care of LVAD and DL    Post LVAD goals of care are to feel stronger and stop losing weight    Patient is now NYHA II  Recommend 2 gram sodium restriction and 1500cc fluid restriction.  Encourage physical activity with graded exercise program.  Requested patient to weigh themselves daily, and to notify us if their weight increases by more than 3 lbs in 1 day or 5 lbs in 1 week.     Listed for transplant: No    UNOS Patient Status  Functional Status: 70% - Cares for self: unable to carry on normal activity or active work  Physical Capacity: No Limitations  Working for Income: No  If no, reason not working: Disability

## 2023-01-30 ENCOUNTER — ANTI-COAG VISIT (OUTPATIENT)
Dept: CARDIOLOGY | Facility: CLINIC | Age: 68
End: 2023-01-30
Payer: MEDICARE

## 2023-01-30 DIAGNOSIS — Z95.811 LVAD (LEFT VENTRICULAR ASSIST DEVICE) PRESENT: Primary | ICD-10-CM

## 2023-01-30 LAB
EXT TIBC: 217
FERRITIN: 327
INR PPP: 1.9
IRON SERPL-MCNC: 32 UG/DL
TRANSFERRIN SERPL-MCNC: 15 MG/DL

## 2023-01-30 PROCEDURE — 93793 PR ANTICOAGULANT MGMT FOR PT TAKING WARFARIN: ICD-10-PCS | Mod: S$GLB,,,

## 2023-01-30 PROCEDURE — 93793 ANTICOAG MGMT PT WARFARIN: CPT | Mod: S$GLB,,,

## 2023-01-30 NOTE — PROGRESS NOTES
INR now just slightly below goal. Medications, chart, and patient findings reviewed. See calendar for dose and follow up plan.

## 2023-01-31 ENCOUNTER — TELEPHONE (OUTPATIENT)
Dept: TRANSPLANT | Facility: CLINIC | Age: 68
End: 2023-01-31
Payer: MEDICARE

## 2023-01-31 NOTE — TELEPHONE ENCOUNTER
----- Message from Kacey Barr RN sent at 1/26/2023  3:49 PM CST -----  Regarding: iron studies: route results to [facility], assess need for iron infusion  [facility]  [phone]

## 2023-02-02 ENCOUNTER — ANTI-COAG VISIT (OUTPATIENT)
Dept: CARDIOLOGY | Facility: CLINIC | Age: 68
End: 2023-02-02
Payer: MEDICARE

## 2023-02-02 DIAGNOSIS — Z95.811 LVAD (LEFT VENTRICULAR ASSIST DEVICE) PRESENT: Primary | ICD-10-CM

## 2023-02-02 LAB — INR PPP: 1.7

## 2023-02-02 PROCEDURE — 93793 ANTICOAG MGMT PT WARFARIN: CPT | Mod: S$GLB,,,

## 2023-02-02 PROCEDURE — 93793 PR ANTICOAGULANT MGMT FOR PT TAKING WARFARIN: ICD-10-PCS | Mod: S$GLB,,,

## 2023-02-02 NOTE — PROGRESS NOTES
Patient reports taking Warfarin 2.5mg on 2/1/23 on his own, took correct Warfarin dose all other days, bloody mucus when blowing nose one day this week(unsure of date), had almond cookies 1/31/23, no other changes noted.

## 2023-02-06 ENCOUNTER — ANTI-COAG VISIT (OUTPATIENT)
Dept: CARDIOLOGY | Facility: CLINIC | Age: 68
End: 2023-02-06
Payer: MEDICARE

## 2023-02-06 DIAGNOSIS — Z95.811 LVAD (LEFT VENTRICULAR ASSIST DEVICE) PRESENT: Primary | ICD-10-CM

## 2023-02-06 LAB — INR PPP: 1.2

## 2023-02-06 PROCEDURE — 93793 ANTICOAG MGMT PT WARFARIN: CPT | Mod: S$GLB,,,

## 2023-02-06 PROCEDURE — 93793 PR ANTICOAGULANT MGMT FOR PT TAKING WARFARIN: ICD-10-PCS | Mod: S$GLB,,,

## 2023-02-06 NOTE — PROGRESS NOTES
Patient called and was given lab result, he verified daily warfarin dose correctly, reports had nose bleed Saturday when blowing his nose, 2/03 resumed mirtazapine, also on 2/03 started doxycycline - 100mg  twice a day for 21 days, same boost intake -1 daily,

## 2023-02-09 ENCOUNTER — ANTI-COAG VISIT (OUTPATIENT)
Dept: CARDIOLOGY | Facility: CLINIC | Age: 68
End: 2023-02-09
Payer: MEDICARE

## 2023-02-09 DIAGNOSIS — Z95.811 LVAD (LEFT VENTRICULAR ASSIST DEVICE) PRESENT: Primary | ICD-10-CM

## 2023-02-09 LAB — INR PPP: 1.3

## 2023-02-09 PROCEDURE — 93793 PR ANTICOAGULANT MGMT FOR PT TAKING WARFARIN: ICD-10-PCS | Mod: S$GLB,,,

## 2023-02-09 PROCEDURE — 93793 ANTICOAG MGMT PT WARFARIN: CPT | Mod: S$GLB,,,

## 2023-02-09 NOTE — PROGRESS NOTES
Patient called and was given lab result, verified correct coumadin dose, reports sinus problems--when blowing nose gets trace of blood on tissue, still on Doxycycline, still drinking boost -2 daily--states not new, no other changes reported

## 2023-02-09 NOTE — PROGRESS NOTES
Patient verified correct Warfarin dose, stated his Warfarin is peach colored, stated one of his blood pressure pills is the same color of his Warfarin pill, but it's a different size, he is certain he is taking the right amount of Warfarin and not mixing up his medications, no other changes noted.

## 2023-02-13 ENCOUNTER — ANTI-COAG VISIT (OUTPATIENT)
Dept: CARDIOLOGY | Facility: CLINIC | Age: 68
End: 2023-02-13
Payer: MEDICARE

## 2023-02-13 DIAGNOSIS — Z95.811 LVAD (LEFT VENTRICULAR ASSIST DEVICE) PRESENT: Primary | ICD-10-CM

## 2023-02-13 LAB — INR PPP: 1.6

## 2023-02-13 PROCEDURE — G0179 PR HOME HEALTH MD RECERTIFICATION: ICD-10-PCS | Mod: ,,, | Performed by: INTERNAL MEDICINE

## 2023-02-13 PROCEDURE — G0179 MD RECERTIFICATION HHA PT: HCPCS | Mod: ,,, | Performed by: INTERNAL MEDICINE

## 2023-02-13 PROCEDURE — 93793 PR ANTICOAGULANT MGMT FOR PT TAKING WARFARIN: ICD-10-PCS | Mod: S$GLB,,,

## 2023-02-13 PROCEDURE — 93793 ANTICOAG MGMT PT WARFARIN: CPT | Mod: S$GLB,,,

## 2023-02-16 ENCOUNTER — ANTI-COAG VISIT (OUTPATIENT)
Dept: CARDIOLOGY | Facility: CLINIC | Age: 68
End: 2023-02-16
Payer: MEDICARE

## 2023-02-16 DIAGNOSIS — Z95.811 LVAD (LEFT VENTRICULAR ASSIST DEVICE) PRESENT: Primary | ICD-10-CM

## 2023-02-16 LAB — INR PPP: 2.1

## 2023-02-16 PROCEDURE — 93793 PR ANTICOAGULANT MGMT FOR PT TAKING WARFARIN: ICD-10-PCS | Mod: S$GLB,,,

## 2023-02-16 PROCEDURE — 93793 ANTICOAG MGMT PT WARFARIN: CPT | Mod: S$GLB,,,

## 2023-02-20 ENCOUNTER — ANTI-COAG VISIT (OUTPATIENT)
Dept: CARDIOLOGY | Facility: CLINIC | Age: 68
End: 2023-02-20
Payer: MEDICARE

## 2023-02-20 DIAGNOSIS — Z95.811 LVAD (LEFT VENTRICULAR ASSIST DEVICE) PRESENT: Primary | ICD-10-CM

## 2023-02-20 LAB — INR PPP: 1.9

## 2023-02-20 PROCEDURE — 93793 PR ANTICOAGULANT MGMT FOR PT TAKING WARFARIN: ICD-10-PCS | Mod: S$GLB,,,

## 2023-02-20 PROCEDURE — 93793 ANTICOAG MGMT PT WARFARIN: CPT | Mod: S$GLB,,,

## 2023-02-20 NOTE — PROGRESS NOTES
Patient called and was given lab result, he verified correct warfarin dose, no other changes reported

## 2023-02-22 ENCOUNTER — TELEPHONE (OUTPATIENT)
Dept: TRANSPLANT | Facility: CLINIC | Age: 68
End: 2023-02-22

## 2023-02-22 NOTE — TELEPHONE ENCOUNTER
Communication received from [facility]    Patient request the visit for today be rescheduled to next week    VAD coordinator notified.

## 2023-02-23 ENCOUNTER — ANTI-COAG VISIT (OUTPATIENT)
Dept: CARDIOLOGY | Facility: CLINIC | Age: 68
End: 2023-02-23
Payer: MEDICARE

## 2023-02-23 DIAGNOSIS — Z95.811 LVAD (LEFT VENTRICULAR ASSIST DEVICE) PRESENT: Primary | ICD-10-CM

## 2023-02-23 LAB — INR PPP: 2.5

## 2023-02-23 PROCEDURE — 93793 PR ANTICOAGULANT MGMT FOR PT TAKING WARFARIN: ICD-10-PCS | Mod: S$GLB,,,

## 2023-02-23 PROCEDURE — 93793 ANTICOAG MGMT PT WARFARIN: CPT | Mod: S$GLB,,,

## 2023-02-27 ENCOUNTER — ANTI-COAG VISIT (OUTPATIENT)
Dept: CARDIOLOGY | Facility: CLINIC | Age: 68
End: 2023-02-27
Payer: MEDICARE

## 2023-02-27 DIAGNOSIS — Z95.811 LVAD (LEFT VENTRICULAR ASSIST DEVICE) PRESENT: Primary | ICD-10-CM

## 2023-02-27 LAB — INR PPP: 4.1

## 2023-02-27 PROCEDURE — 93793 PR ANTICOAGULANT MGMT FOR PT TAKING WARFARIN: ICD-10-PCS | Mod: S$GLB,,,

## 2023-02-27 PROCEDURE — 93793 ANTICOAG MGMT PT WARFARIN: CPT | Mod: S$GLB,,,

## 2023-02-27 NOTE — PROGRESS NOTES
Verbal result taken by Lucy Hernandez with [facility].  PT 37.6 / INR 4.1. Date drawn 02/27/23. Hardcopy to be faxed.

## 2023-02-27 NOTE — PROGRESS NOTES
Verbal result taken from Mary with [facility].  PT 37.6 / INR 4.1. Date drawn 02/27/23. Hardcopy to be faxed.

## 2023-03-01 ENCOUNTER — LAB VISIT (OUTPATIENT)
Dept: LAB | Facility: HOSPITAL | Age: 68
End: 2023-03-01
Payer: MEDICARE

## 2023-03-01 ENCOUNTER — OFFICE VISIT (OUTPATIENT)
Dept: PALLIATIVE MEDICINE | Facility: CLINIC | Age: 68
End: 2023-03-01
Attending: INTERNAL MEDICINE
Payer: MEDICARE

## 2023-03-01 ENCOUNTER — ANTI-COAG VISIT (OUTPATIENT)
Dept: CARDIOLOGY | Facility: CLINIC | Age: 68
End: 2023-03-01
Payer: MEDICARE

## 2023-03-01 VITALS
OXYGEN SATURATION: 98 % | BODY MASS INDEX: 20.78 KG/M2 | WEIGHT: 161.81 LBS | SYSTOLIC BLOOD PRESSURE: 105 MMHG | HEART RATE: 98 BPM | DIASTOLIC BLOOD PRESSURE: 58 MMHG

## 2023-03-01 DIAGNOSIS — Z95.811 HEART REPLACED BY HEART ASSIST DEVICE: ICD-10-CM

## 2023-03-01 DIAGNOSIS — Z95.811 LVAD (LEFT VENTRICULAR ASSIST DEVICE) PRESENT: Primary | ICD-10-CM

## 2023-03-01 DIAGNOSIS — G47.00 INSOMNIA, UNSPECIFIED TYPE: ICD-10-CM

## 2023-03-01 DIAGNOSIS — Z95.811 LVAD (LEFT VENTRICULAR ASSIST DEVICE) PRESENT: ICD-10-CM

## 2023-03-01 DIAGNOSIS — Z51.5 PALLIATIVE CARE ENCOUNTER: ICD-10-CM

## 2023-03-01 DIAGNOSIS — R53.83 FATIGUE, UNSPECIFIED TYPE: ICD-10-CM

## 2023-03-01 DIAGNOSIS — F43.23 ADJUSTMENT DISORDER WITH MIXED ANXIETY AND DEPRESSED MOOD: Primary | ICD-10-CM

## 2023-03-01 DIAGNOSIS — I50.42 CHRONIC COMBINED SYSTOLIC AND DIASTOLIC HEART FAILURE: ICD-10-CM

## 2023-03-01 DIAGNOSIS — R63.0 ANOREXIA: ICD-10-CM

## 2023-03-01 LAB
INR PPP: 2.6 (ref 0.8–1.2)
PROTHROMBIN TIME: 25.9 SEC (ref 9–12.5)

## 2023-03-01 PROCEDURE — 1123F ACP DISCUSS/DSCN MKR DOCD: CPT | Mod: CPTII,S$GLB,, | Performed by: STUDENT IN AN ORGANIZED HEALTH CARE EDUCATION/TRAINING PROGRAM

## 2023-03-01 PROCEDURE — 99497 ADVNCD CARE PLAN 30 MIN: CPT | Mod: S$GLB,,, | Performed by: STUDENT IN AN ORGANIZED HEALTH CARE EDUCATION/TRAINING PROGRAM

## 2023-03-01 PROCEDURE — 3074F SYST BP LT 130 MM HG: CPT | Mod: CPTII,S$GLB,, | Performed by: STUDENT IN AN ORGANIZED HEALTH CARE EDUCATION/TRAINING PROGRAM

## 2023-03-01 PROCEDURE — 85610 PROTHROMBIN TIME: CPT | Performed by: INTERNAL MEDICINE

## 2023-03-01 PROCEDURE — 36415 COLL VENOUS BLD VENIPUNCTURE: CPT | Performed by: INTERNAL MEDICINE

## 2023-03-01 PROCEDURE — 1126F AMNT PAIN NOTED NONE PRSNT: CPT | Mod: CPTII,S$GLB,, | Performed by: STUDENT IN AN ORGANIZED HEALTH CARE EDUCATION/TRAINING PROGRAM

## 2023-03-01 PROCEDURE — 99999 PR PBB SHADOW E&M-EST. PATIENT-LVL II: CPT | Mod: PBBFAC,,, | Performed by: STUDENT IN AN ORGANIZED HEALTH CARE EDUCATION/TRAINING PROGRAM

## 2023-03-01 PROCEDURE — 3008F PR BODY MASS INDEX (BMI) DOCUMENTED: ICD-10-PCS | Mod: CPTII,S$GLB,, | Performed by: STUDENT IN AN ORGANIZED HEALTH CARE EDUCATION/TRAINING PROGRAM

## 2023-03-01 PROCEDURE — 3078F DIAST BP <80 MM HG: CPT | Mod: CPTII,S$GLB,, | Performed by: STUDENT IN AN ORGANIZED HEALTH CARE EDUCATION/TRAINING PROGRAM

## 2023-03-01 PROCEDURE — 99497 PR ADVNCD CARE PLAN 30 MIN: ICD-10-PCS | Mod: S$GLB,,, | Performed by: STUDENT IN AN ORGANIZED HEALTH CARE EDUCATION/TRAINING PROGRAM

## 2023-03-01 PROCEDURE — 3078F PR MOST RECENT DIASTOLIC BLOOD PRESSURE < 80 MM HG: ICD-10-PCS | Mod: CPTII,S$GLB,, | Performed by: STUDENT IN AN ORGANIZED HEALTH CARE EDUCATION/TRAINING PROGRAM

## 2023-03-01 PROCEDURE — 3074F PR MOST RECENT SYSTOLIC BLOOD PRESSURE < 130 MM HG: ICD-10-PCS | Mod: CPTII,S$GLB,, | Performed by: STUDENT IN AN ORGANIZED HEALTH CARE EDUCATION/TRAINING PROGRAM

## 2023-03-01 PROCEDURE — 99999 PR PBB SHADOW E&M-EST. PATIENT-LVL II: ICD-10-PCS | Mod: PBBFAC,,, | Performed by: STUDENT IN AN ORGANIZED HEALTH CARE EDUCATION/TRAINING PROGRAM

## 2023-03-01 PROCEDURE — 99205 PR OFFICE/OUTPT VISIT, NEW, LEVL V, 60-74 MIN: ICD-10-PCS | Mod: S$GLB,,, | Performed by: STUDENT IN AN ORGANIZED HEALTH CARE EDUCATION/TRAINING PROGRAM

## 2023-03-01 PROCEDURE — 1123F PR ADV CARE PLAN DISCUSSED, PLAN OR SURROGATE DOCUMENTED: ICD-10-PCS | Mod: CPTII,S$GLB,, | Performed by: STUDENT IN AN ORGANIZED HEALTH CARE EDUCATION/TRAINING PROGRAM

## 2023-03-01 PROCEDURE — 93793 PR ANTICOAGULANT MGMT FOR PT TAKING WARFARIN: ICD-10-PCS | Mod: S$GLB,,,

## 2023-03-01 PROCEDURE — 93793 ANTICOAG MGMT PT WARFARIN: CPT | Mod: S$GLB,,,

## 2023-03-01 PROCEDURE — 1126F PR PAIN SEVERITY QUANTIFIED, NO PAIN PRESENT: ICD-10-PCS | Mod: CPTII,S$GLB,, | Performed by: STUDENT IN AN ORGANIZED HEALTH CARE EDUCATION/TRAINING PROGRAM

## 2023-03-01 PROCEDURE — 99205 OFFICE O/P NEW HI 60 MIN: CPT | Mod: S$GLB,,, | Performed by: STUDENT IN AN ORGANIZED HEALTH CARE EDUCATION/TRAINING PROGRAM

## 2023-03-01 PROCEDURE — 3008F BODY MASS INDEX DOCD: CPT | Mod: CPTII,S$GLB,, | Performed by: STUDENT IN AN ORGANIZED HEALTH CARE EDUCATION/TRAINING PROGRAM

## 2023-03-01 RX ORDER — MIRTAZAPINE 30 MG/1
30 TABLET, FILM COATED ORAL NIGHTLY
Qty: 30 TABLET | Refills: 11 | Status: SHIPPED | OUTPATIENT
Start: 2023-03-01 | End: 2024-03-07 | Stop reason: SDUPTHER

## 2023-03-01 NOTE — PROGRESS NOTES
Palliative Medicine Clinic Note      Consult Requested By: Dr. Manuel Ramos*    Primary Care Physician:   Jay Guardado DO    Reason for Consult: Advance care planning and symptom management in the setting of Stage D HF      ASSESSMENT/PLAN:     Plan/Recommendations:  Diagnoses and all orders for this visit:    Adjustment disorder with mixed anxiety and depressed mood    LVAD (left ventricular assist device) present /   Chronic combined systolic and diastolic heart failure  -     Ambulatory referral/consult to [facility] Palliative Care  - Stage D CHF due to non-ischemic cardiomyopathy status post DT HM3 implantation 1/13/2021   -has been experiencing progressive fatigue and weight loss  -followed by cardiology      Fatigue, unspecified type  -discussed graded exercise   -discussed balancing rest with activity    Anorexia  -     mirtazapine (REMERON) 30 MG tablet; Take 1 tablet (30 mg total) by mouth every evening.  -discussed improving his diet  -plan for colonoscopy and iron studies will place a referral to gastroenterology    Insomnia, unspecified type  -     mirtazapine (REMERON) 30 MG tablet; Take 1 tablet (30 mg total) by mouth every evening.  -provided tips for sleep hygiene    Palliative care encounter    Medicolegal: Has decision making capacity.  wife  surrogate decision maker and HCPOA.     Psychosocial:  support system consists of wife and adult children     Spiritual: Ep[employer]opalian     Understanding of disease and Illness Trajectory: Patient  has  adequate understanding of his illness, they can benefit from continued education on what to expect in the future.      Goals of care:    Advance Care Planning     Date: 03/01/2023    I engaged the patient in a conversation about advance care planning.      He confirmed that his healthcare power of  is his wife has 1st agent and his daughter as backup.    Become firm T is choice of living will were if things are irreversible he would elect to  withdrawal life sustaining procedures.    We explored the patient's values and preferences for future care.  The patient endorses that what is most important right now is to focus on spending time at home, remaining as independent as possible, and improvement in condition.  Started discussion regarding his end of life wishes.  He said that is something really difficult to think about and he has not fully decided what his wishes are at this stage.  He shared that he wants to be able to live as long as possible.  He also shared that he would not want to be a burden and were to have a prolonged die experience.  However most important for him right now is to feel better and to be able to live as long as possible  Accordingly, we have decided that the best plan to meet the patient's goals includes continuing with treatment          Code status: Full Code     Advance directives:HCPOA and LW on file       16 min time was spent on advance care planning, goals of care discussion, emotional support, formulating and communicating prognosis and goals of care, exploring burden/benefit of various approaches of treatment.        Follow up: 1m     Plan discussed with referring team     SUBJECTIVE:     History obtained from:  Patient     complaint: No chief complaint on file.        History of Present Illness / Interval History:  Rocco France is 67 y.o. year old male presenting with heart failure.  Referred to Palliative Care for evaluation and management of physical symptoms, advance care planning,, and additional support.. male attended the appointment alone    He has been experiencing dyspnea especially when bending over.      He has anxiety often.      He has depression because he feels isolated and for he feels that he can longer spend time with his friends and do things that he likes to do like fishing.      He feels tired most of the time he is unable to do the things that he wants to do.    He is unable to sleep at  night.      He feels  lack of appetite and he has been losing weight he only eats breakfast and dinner      Disease History:  Stage D CHF due to non-ischemic cardiomyopathy status post DT HM3 implantation 1/13/2021     PAF, HTN, HLD, DM2    Prolonged hospitalization 1/272022-2/17/22 for acute subdural hematoma and acute basal cistern subarachnoid hemorrhage    Previous experience or exposure to a serious illness: yes      External  database queried on 03/01/2023  by Rupinder Vega .   The results reviewed and considered with the clinical data in the decision whether or not to prescribe a controlled substance.      Medications:    Current Outpatient Medications:     amLODIPine (NORVASC) 5 MG tablet, Take 1 tablet (5 mg total) by mouth once daily., Disp: 30 tablet, Rfl: 5    atorvastatin (LIPITOR) 80 MG tablet, Take 1 tablet by mouth once daily, Disp: 90 tablet, Rfl: 0    digoxin (LANOXIN) 125 mcg tablet, Take 1 tablet (0.125 mg total) by mouth once daily., Disp: 90 tablet, Rfl: 0    docusate sodium (COLACE) 100 MG capsule, Take 100 mg by mouth Daily., Disp: , Rfl:     ferrous gluconate 324 mg (37.5 mg iron) Tab tablet, Take 1 tablet (324 mg total) by mouth daily with breakfast., Disp: 30 tablet, Rfl: 11    furosemide (LASIX) 40 MG tablet, Take 1 tablet (40 mg total) by mouth once daily at 6am., Disp: 30 tablet, Rfl: 11    lisinopriL (PRINIVIL,ZESTRIL) 5 MG tablet, Take 1 tablet (5 mg total) by mouth once daily., Disp: 90 tablet, Rfl: 3    magnesium oxide (MAG-OX) 400 mg (241.3 mg magnesium) tablet, Take 1 tablet (400 mg total) by mouth 3 (three) times daily., Disp: 90 tablet, Rfl: 11    mirtazapine (REMERON) 30 MG tablet, Take 1 tablet (30 mg total) by mouth every evening., Disp: 30 tablet, Rfl: 11    SITagliptin (JANUVIA) 100 MG Tab, Take 1 tablet (100 mg total) by mouth once daily., Disp: 90 tablet, Rfl: 0    spironolactone (ALDACTONE) 25 MG tablet, Take 1 tablet (25 mg total) by mouth once daily., Disp: 30  tablet, Rfl: 11    warfarin (COUMADIN) 5 MG tablet, Take 1 tablet (5 mg total) by mouth Daily., Disp: 90 tablet, Rfl: 3      Past Medical History:   Diagnosis Date    Acute respiratory failure requiring reintubation 1/28/2022    CLARISSE (acute kidney injury) 1/11/2021    Diabetes mellitus     Kidney stones      Past Surgical History:   Procedure Laterality Date    CARDIAC CATHETERIZATION  2010    no stents    CARDIAC DEFIBRILLATOR PLACEMENT  2010    CARDIAC DEFIBRILLATOR PLACEMENT      HERNIA REPAIR      IRRIGATION OF MEDIASTINUM N/A 1/14/2021    Procedure: IRRIGATION, MEDIASTINUM;  Surgeon: Zenon Corona MD;  Location: [facility] OR [address] [place] [address];  Service: Cardiovascular;  Laterality: N/A;    KNEE ARTHROSCOPY Right     LEFT VENTRICULAR ASSIST DEVICE Left 1/13/2021    Procedure: INSERTION- HeartMate 3 LEFT VENTRICULAR ASSIST DEVICE ;  Surgeon: Zenon Corona MD;  Location: [facility] OR [address] [place] [address];  Service: Cardiovascular;  Laterality: Left;    RECONSTRUCTION OF PERICARDIUM N/A 1/14/2021    Procedure: RECONSTRUCTION, PERICARDIUM;  Surgeon: Zenon Corona MD;  [facility];  Service: Cardiovascular;  Laterality: N/A;    RIGHT HEART CATHETERIZATION Right 11/2/2020    Procedure: INSERTION, CATHETER, RIGHT HEART;  Surgeon: Deana Lake MD;  Location: [facility] CATH LAB;  Service: Cardiology;  Laterality: Right;    RIGHT HEART CATHETERIZATION Right 3/24/2022    Procedure: INSERTION, CATHETER, RIGHT HEART;  Surgeon: Manuel Ramos Jr., MD;  Location: [facility] CATH LAB;  Service: Cardiology;  Laterality: Right;    STERNAL WOUND CLOSURE  1/13/2021    Procedure: TEMPORARY CLOSURE OF CHEST;  Surgeon: Zenon Corona MD;  Location: [address];  Service: Cardiovascular;;    STERNAL WOUND CLOSURE N/A 1/14/2021    Procedure: CLOSURE, WOUND, STERNUM;  Surgeon: Zenon Corona MD;  Location: [facility] OR [address] [place] [address];  Service: Cardiovascular;  Laterality: N/A;     Family History   Problem Relation Age of Onset    Heart attack Mother     Heart  failure Brother     Heart attack Brother     Hypertension Brother      Review of patient's allergies indicates:   Allergen Reactions    Pcn [penicillins] Hives       OBJECTIVE:       ROS:  Review of Systems   Constitutional:  Positive for appetite change and fatigue. Negative for activity change.   HENT:  Negative for hearing loss and sore throat.    Eyes:  Negative for visual disturbance.   Respiratory:  Positive for shortness of breath.    Cardiovascular:  Negative for chest pain.   Gastrointestinal:  Negative for constipation, diarrhea and nausea.   Genitourinary:  Negative for dysuria.   Musculoskeletal:  Negative for back pain and gait problem.   Neurological:  Negative for headaches and memory loss.   Psychiatric/Behavioral:  Positive for dysphoric mood and sleep disturbance. Negative for confusion. The patient is not nervous/anxious.        Review of Symptoms      Symptom Assessment (ESAS 0-10 Scale)  Pain:  0  Dyspnea:  2  Anxiety:  3  Nausea:  0  Depression:  6  Anorexia:  3  Fatigue:  5  Insomnia:  5  Restlessness:  0  Agitation:  0     CAM / Delirium:  Negative  Constipation:  Negative  Diarrhea:  Negative    Anxiety:  Is not nervous/anxious  Constipation:  No constipation    Modified Heath Scale:  1    Performance Status:  70    Living Arrangements:  Lives with spouse    Psychosocial/Cultural:   See Palliative Psychosocial Note: Yes   for >25yrs, have adult children from previous relationships.  The lives with his wife    The pt's adult daughter, Laura Barron lives in [place], the pt's step daughter, Mary Chacon lives in [place] and son Ollie Joseph lives in [place], [place].     He also has adult grand children.     Support system also include siblings,Desi Blanco (pt's sister, listed as additional caregiver lives in [place])    He used to be a  for over 30 years and he enjoys fishing    **Primary  to Follow**  Palliative Care  Consult: Yes    Spiritual:  F  - Shawna and Belief:  Jain  I - Importance:  High  C - Community:  Yes    Advance Care Planning   Advance Directives:   Living Will: Yes        Copy on chart: Yes    LaPOST: No    Do Not Resuscitate Status: No    Medical Power of : Yes    Agent's Name:  Meme France   Agent's Contact Number:  [phone]    Decision Making:  Patient answered questions  Goals of Care: The patient endorses that what is most important right now is to focus on improvement in condition and life prolongation     Accordingly, we have decided that the best plan to meet the patient's goals includes continuing with treatment            Physical Exam:  Vitals: Pulse: 98 (03/01/23 1002)  BP: (!) 105/58 (03/01/23 1002)  SpO2: 98 % (03/01/23 1002)  Physical Exam  Constitutional:       General: He is not in acute distress.  HENT:      Head: Normocephalic and atraumatic.   Eyes:      General: No scleral icterus.  Pulmonary:      Effort: Pulmonary effort is normal. No respiratory distress.   Abdominal:      General: There is no distension.   Musculoskeletal:      Cervical back: Neck supple.   Skin:     Findings: No rash.   Neurological:      Mental Status: He is alert and oriented to person, place, and time.   Psychiatric:         Mood and Affect: Mood normal. Affect is tearful.         Labs:  CBC:   WBC   Date Value Ref Range Status   01/26/2023 13.22 (H) 3.90 - 12.70 K/uL Final       Hemoglobin   Date Value Ref Range Status   01/26/2023 9.5 (L) 14.0 - 18.0 g/dL Final       POC Hematocrit   Date Value Ref Range Status   03/22/2022 30 (L) 36 - 54 %PCV Final     Hematocrit   Date Value Ref Range Status   01/26/2023 31.6 (L) 40.0 - 54.0 % Final       MCV   Date Value Ref Range Status   01/26/2023 87 82 - 98 fL Final       Platelets   Date Value Ref Range Status   01/26/2023 249 150 - 450 K/uL Final       LFT:   Lab Results   Component Value Date    AST 12 01/26/2023    ALKPHOS 116 01/26/2023    BILITOT 1.1 (H) 01/26/2023       Albumin:    Albumin   Date Value Ref Range Status   01/26/2023 2.6 (L) 3.5 - 5.2 g/dL Final     Protein:   Total Protein   Date Value Ref Range Status   01/26/2023 10.6 (H) 6.0 - 8.4 g/dL Final         Radiology:I have reviewed all pertinent imaging results/findings within the past 24 hours.    Results for orders placed during the hospital encounter of 09/14/22  Echo  Interpretation Summary  · There is an LVAD present. Base speed is 5100 RPMs. The pump type is a Heartmate III. The interventricular septum appears midline. The aortic valve opens with each cardiac cycle.  · The left ventricle is moderately enlarged with severely decreased systolic function. The estimated ejection fraction is 20%.  · Moderate right ventricular enlargement with mildly reduced right ventricular systolic function.  · Grade I left ventricular diastolic dysfunction.  · Severe biatrial enlargement.  · Mild to moderate tricuspid regurgitation.  · Mild mitral regurgitation.  · The estimated PA systolic pressure is 28 mmHg.  · Normal central venous pressure (3 mmHg).        I spent a total of 65 minutes on the day of the visit. This includes face to face time in discussion of goals of care, symptom assessment, coordination of care and emotional support.  This also includes non-face to face time preparing to see the patient (eg, review of tests/imaging), obtaining and/or reviewing separately obtained history, documenting clinical information in the electronic or other health record, independently interpreting results and communicating results to the patient/family/caregiver, or care coordinator.     16 minutes spent in discussing ACP    This note was partially created using [employer] Voice Recognition software. Typographical and content errors may occur with this process. While efforts are made to detect and correct such errors, in some cases errors will persist. For this reason, wording in this document should be considered in the proper context and not  strictly verbatim.      Signature: Rupinder Vega MD

## 2023-03-01 NOTE — PROGRESS NOTES
[facility]  Palliative Care   Psychosocial Assessment    Patient Name: Rocco France  MRN: [id number]  Palliative Care Provider: Rupinder Vega MD   Primary Care Physician: Jay Guardado DO    Reason for Referral: assistance with clarification of goals of care      Present during Interview: patient and ER records.      Primary Language:English   Needed: no      Past Medical Situation:   PMH:   Past Medical History:   Diagnosis Date    Acute respiratory failure requiring reintubation 1/28/2022    CLARISSE (acute kidney injury) 1/11/2021    Diabetes mellitus     Kidney stones      Mental Health/Substance Use History: patient reports depression and anxiety  Risk of Abuse, neglect or exploitation: none identified   Current or Previous Trauma and/or evidence of PTSD: none identified   Non-traditional Health practices: none identified     Understanding of diagnosis and treatment: patient has good understanding of diagnosis and treatment   Experience/Comfort level with health care system: patient with serious chronic illness and extensive experience within healthcare system     Patients Mental Status: patient is alert and oriented to person, place, and time    Socio-Economic Factors/Resources:  Address: [address] [place] [address]  Phone Number: [phone] (home)     Marital Status:   Household composition: patient lives with his spouse  Children: patient has one daughter, and a step son and step daughter    Patient/Family perceptions about Caregiving Needs; availability and capacity: patient able to meet his care giving needs     Family Structure, Dynamics/Relationships: patient has decent sized family, he wishes they would get together more often     Patient/Family Strengths/Resilience: patient is appreciative and adaptable   Patient/Family Coping Style: patient tearful at times, difficulty with depression     Activities of Daily Living: patient is  independent with activities of daily living   Support Systems-Family & Community (Home Health, HME etc):     Transportation:  yes    Work/Education History: disability  Self-Care Activities/Hobbies: patient likes to cut his grass, wash the car, and go fishing      History: no    Financial Resources:Medicare      Advanced Care Planning & Legal Concerns:   Advanced Directives/Living Will: yes  LaPOST/POLST: no   Planning:  no    Emergency Contacts:    Spirituality, Culture & Coping Mechanisms:  F- Shawna and Belief: Sikhism     I - Importance: yes    C - Community/Culture Values: yes     A - Address in Care: yes      Goals/Hopes/Expectations: patient hopes to be more active  Fears/Anxiety/Concerns: patient has anxiety around appointments, likes to be on time    Plan of Care:     Palliative provider and this  met with patient in exam room. Patient expressed feeling depressed because he doesn't do any of the activities he used to do. Patient stated his friends stopped visiting him and stopped inviting him to go fishing. Patient stated his family members have their own lives, but they do check in with him by phone occasionally. Patient stated he does have the ability to go fishing with friends. Provider and this  informed patient his friends may not realize he has the ability to go fishing. This  encouraged patient to reach out to a friend. Patient stated he will try and reach out this coming Friday. This  remains available for support.     Trey Perales, [facility]  Palliative Medicine

## 2023-03-01 NOTE — PROGRESS NOTES
Pt returned clinic's call about out of range INR. Pt was questioned and confirmed he did hold on 2/27 and took 1x5mg on 2/28. Pt reports there are no other changes.

## 2023-03-03 DIAGNOSIS — Z95.811 LVAD (LEFT VENTRICULAR ASSIST DEVICE) PRESENT: Primary | ICD-10-CM

## 2023-03-03 RX ORDER — WARFARIN SODIUM 5 MG/1
5 TABLET ORAL DAILY
Qty: 35 TABLET | Refills: 5 | Status: SHIPPED | OUTPATIENT
Start: 2023-03-03 | End: 2023-03-16 | Stop reason: SDUPTHER

## 2023-03-06 ENCOUNTER — ANTI-COAG VISIT (OUTPATIENT)
Dept: CARDIOLOGY | Facility: CLINIC | Age: 68
End: 2023-03-06
Payer: MEDICARE

## 2023-03-06 DIAGNOSIS — Z95.811 LVAD (LEFT VENTRICULAR ASSIST DEVICE) PRESENT: Primary | ICD-10-CM

## 2023-03-06 LAB — INR PPP: 1.7

## 2023-03-06 PROCEDURE — 93793 ANTICOAG MGMT PT WARFARIN: CPT | Mod: S$GLB,,,

## 2023-03-06 PROCEDURE — 93793 PR ANTICOAGULANT MGMT FOR PT TAKING WARFARIN: ICD-10-PCS | Mod: S$GLB,,,

## 2023-03-08 ENCOUNTER — TELEPHONE (OUTPATIENT)
Dept: TRANSPLANT | Facility: CLINIC | Age: 68
End: 2023-03-08
Payer: MEDICARE

## 2023-03-08 NOTE — TELEPHONE ENCOUNTER
Iron results from 1/31 reviewed by Dr. Ramos. Patient's iron levels qualify him for IV iron per MD. Pharmacists and infusion suite notified to coordinate initiation.

## 2023-03-09 ENCOUNTER — ANTI-COAG VISIT (OUTPATIENT)
Dept: CARDIOLOGY | Facility: CLINIC | Age: 68
End: 2023-03-09
Payer: MEDICARE

## 2023-03-09 DIAGNOSIS — Z95.811 LVAD (LEFT VENTRICULAR ASSIST DEVICE) PRESENT: Primary | ICD-10-CM

## 2023-03-09 LAB — INR PPP: 2.2

## 2023-03-09 PROCEDURE — 93793 PR ANTICOAGULANT MGMT FOR PT TAKING WARFARIN: ICD-10-PCS | Mod: S$GLB,,,

## 2023-03-09 PROCEDURE — 93793 ANTICOAG MGMT PT WARFARIN: CPT | Mod: S$GLB,,,

## 2023-03-13 ENCOUNTER — TELEPHONE (OUTPATIENT)
Dept: TRANSPLANT | Facility: CLINIC | Age: 68
End: 2023-03-13
Payer: MEDICARE

## 2023-03-13 ENCOUNTER — ANTI-COAG VISIT (OUTPATIENT)
Dept: CARDIOLOGY | Facility: CLINIC | Age: 68
End: 2023-03-13
Payer: MEDICARE

## 2023-03-13 DIAGNOSIS — Z95.811 LVAD (LEFT VENTRICULAR ASSIST DEVICE) PRESENT: Primary | ICD-10-CM

## 2023-03-13 LAB — INR PPP: 2

## 2023-03-13 PROCEDURE — 93793 ANTICOAG MGMT PT WARFARIN: CPT | Mod: S$GLB,,,

## 2023-03-13 PROCEDURE — 93793 PR ANTICOAGULANT MGMT FOR PT TAKING WARFARIN: ICD-10-PCS | Mod: S$GLB,,,

## 2023-03-13 NOTE — PROGRESS NOTES
Pt returned the call to clinic and was advised of his doses. Pt repeated doses back to me. Pt denied any changes to be noted and confirmed he has been taking all of the correct doses. Pt was given retest date.

## 2023-03-13 NOTE — TELEPHONE ENCOUNTER
Signed Home Health Orders has been faxed to [facility]  Date 03/13/23  Order Number(s): [id number]

## 2023-03-13 NOTE — TELEPHONE ENCOUNTER
Signed Home Health Orders has been faxed to [facility]  Date 03/13/23  Order Number(s): [id number] EVALUATION DATED 02/18/23

## 2023-03-16 DIAGNOSIS — Z95.811 LVAD (LEFT VENTRICULAR ASSIST DEVICE) PRESENT: ICD-10-CM

## 2023-03-16 DIAGNOSIS — I10 ESSENTIAL HYPERTENSION: ICD-10-CM

## 2023-03-16 RX ORDER — EPINEPHRINE 0.3 MG/.3ML
0.3 INJECTION SUBCUTANEOUS ONCE AS NEEDED
Status: CANCELLED | OUTPATIENT
Start: 2023-03-17

## 2023-03-16 RX ORDER — DIGOXIN 125 MCG
0.12 TABLET ORAL DAILY
Qty: 90 TABLET | Refills: 3 | Status: SHIPPED | OUTPATIENT
Start: 2023-03-16 | End: 2024-03-25

## 2023-03-16 RX ORDER — HEPARIN 100 UNIT/ML
500 SYRINGE INTRAVENOUS
Status: CANCELLED | OUTPATIENT
Start: 2023-03-17

## 2023-03-16 RX ORDER — DIPHENHYDRAMINE HYDROCHLORIDE 50 MG/ML
50 INJECTION INTRAMUSCULAR; INTRAVENOUS ONCE AS NEEDED
Status: CANCELLED | OUTPATIENT
Start: 2023-03-17

## 2023-03-16 RX ORDER — WARFARIN SODIUM 5 MG/1
5 TABLET ORAL DAILY
Qty: 35 TABLET | Refills: 5 | Status: ON HOLD | OUTPATIENT
Start: 2023-03-16 | End: 2023-05-23 | Stop reason: SDUPTHER

## 2023-03-16 RX ORDER — AMLODIPINE BESYLATE 5 MG/1
5 TABLET ORAL DAILY
Qty: 30 TABLET | Refills: 5 | Status: SHIPPED | OUTPATIENT
Start: 2023-03-16 | End: 2023-05-25

## 2023-03-16 RX ORDER — SODIUM CHLORIDE 0.9 % (FLUSH) 0.9 %
10 SYRINGE (ML) INJECTION
Status: CANCELLED | OUTPATIENT
Start: 2023-03-17

## 2023-03-16 RX ORDER — SPIRONOLACTONE 25 MG/1
25 TABLET ORAL DAILY
Qty: 30 TABLET | Refills: 11 | Status: SHIPPED | OUTPATIENT
Start: 2023-03-16 | End: 2023-05-25

## 2023-03-16 RX ORDER — METHYLPREDNISOLONE SOD SUCC 125 MG
125 VIAL (EA) INJECTION ONCE AS NEEDED
Status: CANCELLED | OUTPATIENT
Start: 2023-03-17

## 2023-03-20 ENCOUNTER — ANTI-COAG VISIT (OUTPATIENT)
Dept: CARDIOLOGY | Facility: CLINIC | Age: 68
End: 2023-03-20
Payer: MEDICARE

## 2023-03-20 DIAGNOSIS — Z95.811 LVAD (LEFT VENTRICULAR ASSIST DEVICE) PRESENT: Primary | ICD-10-CM

## 2023-03-20 LAB — INR PPP: 3

## 2023-03-20 PROCEDURE — 93793 PR ANTICOAGULANT MGMT FOR PT TAKING WARFARIN: ICD-10-PCS | Mod: S$GLB,,,

## 2023-03-20 PROCEDURE — 93793 ANTICOAG MGMT PT WARFARIN: CPT | Mod: S$GLB,,,

## 2023-03-20 NOTE — PROGRESS NOTES
Patient called and was given lab result, verified correct coumadin dose, had 1 beer -Saturday, no other changes

## 2023-03-23 ENCOUNTER — ANTI-COAG VISIT (OUTPATIENT)
Dept: CARDIOLOGY | Facility: CLINIC | Age: 68
End: 2023-03-23
Payer: MEDICARE

## 2023-03-23 DIAGNOSIS — Z95.811 LVAD (LEFT VENTRICULAR ASSIST DEVICE) PRESENT: Primary | ICD-10-CM

## 2023-03-23 LAB — INR PPP: 2.9

## 2023-03-23 PROCEDURE — 93793 ANTICOAG MGMT PT WARFARIN: CPT | Mod: S$GLB,,,

## 2023-03-23 PROCEDURE — 93793 PR ANTICOAGULANT MGMT FOR PT TAKING WARFARIN: ICD-10-PCS | Mod: S$GLB,,,

## 2023-03-27 ENCOUNTER — ANTI-COAG VISIT (OUTPATIENT)
Dept: CARDIOLOGY | Facility: CLINIC | Age: 68
End: 2023-03-27
Payer: MEDICARE

## 2023-03-27 DIAGNOSIS — Z95.811 LVAD (LEFT VENTRICULAR ASSIST DEVICE) PRESENT: Primary | ICD-10-CM

## 2023-03-27 LAB — INR PPP: 2.5

## 2023-03-27 PROCEDURE — 93793 ANTICOAG MGMT PT WARFARIN: CPT | Mod: S$GLB,,,

## 2023-03-27 PROCEDURE — 93793 PR ANTICOAGULANT MGMT FOR PT TAKING WARFARIN: ICD-10-PCS | Mod: S$GLB,,,

## 2023-04-04 ENCOUNTER — TELEPHONE (OUTPATIENT)
Dept: PALLIATIVE MEDICINE | Facility: CLINIC | Age: 68
End: 2023-04-04
Payer: MEDICARE

## 2023-04-04 NOTE — PROGRESS NOTES
ADVANCED HEART FAILURE AND TRANSPLANTATION LVAD CLINIC VISIT      CHIEF COMPLAINT:  Follow-up of chronic heart failure post-LVAD    HISTORY OF PRESENT ILLNESS:  Rocco France is a 67 y.o.  male with a past medical history remarkable for non-ischemic cardiomyopathy status post DT HM3 implantation 1/13/2021 with sternal closure 1/14/2021 with unremarkable post-operative course.    He was admitted for syncope 1/27/2022 at which time he was found to have a evolving right cerebral acute subdural hematoma and acute basal cistern subarachnoid hemorrhage. He requiring intubation with prolonged hospitalization and was incidentally found to be positive for COVID-19 and treated with remdesivir. He was taken off of aspirin and discharged home 2/17/2022. He was also admitted 3/2022 for 3 days due to dysequilibrium, diplopia and repeat CT head was stable. Neurology recommended myasthenia gravis workup which was negative.     Co-morbidities: PAF, HTN, HLD, DM2, COVID-19 infection 1/2022    In clinic 7/13/2022 at which time due falls and orthostatic BP with lightheadedness, lisinopril was reduced to 5 mg once a day and amlodipine reduced to 5 mg once a day. We also discontinued BID Lasix and changed to 40 mg to take only as needed if weight goes up by >3 lbs in one day or >5 lbs in one week. Given weight loss with recent night sweats and low appetite, we also recommended CT chest/abdomen/pelvis. He is also seeing palliative care.    Last seen in clinic 1/26/2023 where he continued to endorse weight loss. CT A/P was repeated without significant findings. He was to also see palliative care, whom he last saw 3/2023 at which time was referred to GI for colonoscopy as well as started on Remeron.     Since their last clinic visit, he has been doing well. Doppler 72 at 5200 RPM, DLES 1. He is continuing to lose weight despite starting Remeron and does not have an appetite. Feels low energy and is not able to do more  enjoyable activities. He mostly eats breakfast and only gets four hours of sleep at night and naps during the day. Creatinine is higher at 1.6 today with rise in BUN as well and notes he has not been drinking much. Speed has been increased from 5100 to 5200 more recently 9/2022 during admission for drop in Hb. GI did not perform any intervention at the time and he was started on PO iron.     Notes night sweats that have been ongoing since months without fevers or chills and progressive weight loss.   No syncope but multiple presyncopal episodes without recent falls. Denies SOB with current activity, orthopnea, PND, bendopnea, abdominal distension, early satiety, nausea, lower extremity edema, chest discomfort, presyncope, palpitations, or syncope. Denies adverse bleeding, no hematochezia/melena/hematuria/hematemesis. Notes some night sweats with low appetite that is new but not frequent with progressive weight loss.     INR goal: Goal INR 2.0-3.0  Antiplatelets: OFF ASPIRIN AFTER SAH/SDH 1/2022  LDH: stable overall  Speed set at:  5200  Pulsatility: non-pulsatile  Antihypertensives:  Amlodipine 10 mg daily  Diuretic: Lasix 80 mg BID  GDMT Aldactone 25 mg daily, lisinopril 20 mg AM/10 mg PM  VAD interrogation: no events  TXP JACEY INTERROGATIONS 4/5/2023 1/26/2023 9/16/2022   Type HeartMate3 HeartMate3 -   Flow 4.1 4.4 -   Speed 5200 5200 -   PI 6.6 5.8 -   Power (Edwards) 3.7 3.8 -   LSL 4800 4800 -   Pulsatility No Pulse No Pulse Pulse     I interrogated the patient's HeartMate 3 LVAD.     Current device parameters reviewed. There was no evidence of power spikes or suction events. The driveline was structurally intact without evidence of infection.     Cardiac Data:  Transthoracic Echo: Results for orders placed during the hospital encounter of 05/11/22  · There is an LVAD present. Base speed is 5100 RPMs. The pump type is a Heartmate III. The interventricular septum appears to bow into the right ventricle. The aortic  valve opens intermittently with trivial AR.  · Mild left atrial enlargement.  · The left ventricle is severely enlarged LVEDD 69 mm with severely decreased systolic function.  · There is severe left ventricular global hypokinesis. The estimated ejection fraction is 15%.  · Left ventricular diastolic dysfunction.  · Mild right ventricular enlargement with moderately reduced right ventricular systolic function. TAPSE 0.94  · Mild-to-moderate mitral regurgitation.  · Moderate tricuspid regurgitation.  · Elevated central venous pressure (15 mmHg).  · The estimated PA systolic pressure is 32 mmHg.    ECG 3/22/2022: AF with RVR and aberrantly conducted beats 124 bpm, IVCD  ms    Left Heart Catheterization: none recent    Right Heart Catheterization: Results for orders placed during the hospital encounter of 03/22/22  RA: 16/ 16/ 11 RV: 30/ 10 PA: 35/ 16/ 22 PWP: 15/ 19/ 14 .   Cardiac output was 6.5  by Monse. Cardiac index is 3.1 L/min/m2.   O2 Sat: PA 67%.   Pulmonary vascular resistance: 98. Systemic vascular resistance: 749.   BP 90/63 (72);99%  Wedge sat 95%  HM 3 @5100    Devices: ICD 2010    PAST MEDICAL HISTORY:  Past Medical History:   Diagnosis Date    Acute respiratory failure requiring reintubation 1/28/2022    CLARISSE (acute kidney injury) 1/11/2021    Diabetes mellitus     Kidney stones        PAST SURGICAL HISTORY:  Past Surgical History:   Procedure Laterality Date    CARDIAC CATHETERIZATION  2010    no stents    CARDIAC DEFIBRILLATOR PLACEMENT  2010    CARDIAC DEFIBRILLATOR PLACEMENT      HERNIA REPAIR      IRRIGATION OF MEDIASTINUM N/A 1/14/2021    Procedure: IRRIGATION, MEDIASTINUM;  Surgeon: Zenon Corona MD;  Location: [facility] OR [address] [place] [address];  Service: Cardiovascular;  Laterality: N/A;    KNEE ARTHROSCOPY Right     LEFT VENTRICULAR ASSIST DEVICE Left 1/13/2021    Procedure: INSERTION- HeartMate 3 LEFT VENTRICULAR ASSIST DEVICE ;  Surgeon: Zenon Corona MD;  Location: [facility] OR [address] [place] [address];  Service:  Cardiovascular;  Laterality: Left;    RECONSTRUCTION OF PERICARDIUM N/A 1/14/2021    Procedure: RECONSTRUCTION, PERICARDIUM;  Surgeon: Zenon Corona MD;  [facility];  Service: Cardiovascular;  Laterality: N/A;    RIGHT HEART CATHETERIZATION Right 11/2/2020    Procedure: INSERTION, CATHETER, RIGHT HEART;  Surgeon: Deana Lake MD;  Location: [facility] CATH LAB;  Service: Cardiology;  Laterality: Right;    RIGHT HEART CATHETERIZATION Right 3/24/2022    Procedure: INSERTION, CATHETER, RIGHT HEART;  Surgeon: Manuel Ramos Jr., MD;  Location: [facility] CATH LAB;  Service: Cardiology;  Laterality: Right;    STERNAL WOUND CLOSURE  1/13/2021    Procedure: TEMPORARY CLOSURE OF CHEST;  Surgeon: Zenon Corona MD;  Location: [facility] OR [address] [place] [address];  Service: Cardiovascular;;    STERNAL WOUND CLOSURE N/A 1/14/2021    Procedure: CLOSURE, WOUND, STERNUM;  Surgeon: Zenon Corona MD;  Location: [facility] OR [address] [place] [address];  Service: Cardiovascular;  Laterality: N/A;       SOCIAL HISTORY  Social History     Socioeconomic History    Marital status:    Tobacco Use    Smoking status: Some Days     Types: Cigarettes    Smokeless tobacco: Never   Substance and Sexual Activity    Alcohol use: No       FAMILY HISTORY  Family History   Problem Relation Age of Onset    Heart attack Mother     Heart failure Brother     Heart attack Brother     Hypertension Brother        ALLERGIES:  Review of patient's allergies indicates:   Allergen Reactions    Pcn [penicillins] Hives       MEDICATIONS  Current Outpatient Medications on File Prior to Visit   Medication Sig Dispense Refill    amLODIPine (NORVASC) 5 MG tablet Take 1 tablet (5 mg total) by mouth once daily. 30 tablet 5    atorvastatin (LIPITOR) 80 MG tablet Take 1 tablet by mouth once daily 90 tablet 0    digoxin (LANOXIN) 125 mcg tablet Take 1 tablet (0.125 mg total) by mouth once daily. 90 tablet 3    docusate sodium (COLACE) 100 MG capsule Take 100 mg by mouth Daily.      ferrous  gluconate 324 mg (37.5 mg iron) Tab tablet Take 1 tablet (324 mg total) by mouth daily with breakfast. 30 tablet 11    furosemide (LASIX) 40 MG tablet Take 1 tablet (40 mg total) by mouth once daily at 6am. 30 tablet 11    lisinopriL (PRINIVIL,ZESTRIL) 5 MG tablet Take 1 tablet (5 mg total) by mouth once daily. 90 tablet 3    magnesium oxide (MAG-OX) 400 mg (241.3 mg magnesium) tablet Take 1 tablet (400 mg total) by mouth 3 (three) times daily. 90 tablet 11    mirtazapine (REMERON) 30 MG tablet Take 1 tablet (30 mg total) by mouth every evening. 30 tablet 11    SITagliptin (JANUVIA) 100 MG Tab Take 1 tablet (100 mg total) by mouth once daily. 90 tablet 0    spironolactone (ALDACTONE) 25 MG tablet Take 1 tablet (25 mg total) by mouth once daily. 30 tablet 11    warfarin (COUMADIN) 5 MG tablet Take 1 tablet (5 mg total) by mouth Daily. 35 tablet 5     No current facility-administered medications on file prior to visit.       REVIEW OF SYSTEMS  Review of Systems   All other systems reviewed and are negative.    PHYSICAL EXAM:    There were no vitals filed for this visit. There is no height or weight on file to calculate BMI.    GENERAL: Alert, NAD   HEENT: Anicteric sclerae  NECK: JVP not visible above level of clavicle while sitting upright or reclining 45 degrees without hepatojugular reflux  CARDIOVASCULAR: VAD hum present  PULMONARY: Lungs clear to auscultation bilaterally  ABDOMEN: Soft, nontender, nondistended. Driveline site c/d/i. No guarding, no rebound, no hepatomegaly  EXTREMITIES: Warm. No clubbing, cyanosis or edema. No pre-sacral edema  NEUROLOGIC: No focal deficits    LABS:    BMP  Lab Results   Component Value Date     01/26/2023    K 3.8 01/26/2023     01/26/2023    CO2 25 01/26/2023    BUN 22 01/26/2023    CREATININE 1.0 01/26/2023    CALCIUM 9.6 01/26/2023    ANIONGAP 12 01/26/2023    ESTGFRAFRICA 55.3 (A) 07/13/2022    EGFRNONAA 47.8 (A) 07/13/2022       Magnesium   Date Value Ref Range  Status   01/26/2023 2.1 1.6 - 2.6 mg/dL Final       Lab Results   Component Value Date    WBC 13.22 (H) 01/26/2023    HGB 9.5 (L) 01/26/2023    HCT 31.6 (L) 01/26/2023    MCV 87 01/26/2023     01/26/2023       Lab Results   Component Value Date    INR 2.5 03/27/2023    INR 2.9 03/23/2023    INR 3.0 03/20/2023       BNP   Date Value Ref Range Status   01/26/2023 122 (H) 0 - 99 pg/mL Final     Comment:     Values of less than 100 pg/ml are consistent with non-CHF populations.   09/16/2022 382 (H) 0 - 99 pg/mL Final     Comment:     Values of less than 100 pg/ml are consistent with non-CHF populations.   09/14/2022 308 (H) 0 - 99 pg/mL Final     Comment:     Values of less than 100 pg/ml are consistent with non-CHF populations.       LD   Date Value Ref Range Status   01/26/2023 140 110 - 260 U/L Final     Comment:     Results are increased in hemolyzed samples.   09/16/2022 153 110 - 260 U/L Final     Comment:     Results are increased in hemolyzed samples.   09/15/2022 169 110 - 260 U/L Final     Comment:     Results are increased in hemolyzed samples.       IMPRESSION:    NYHA Class II  ACC/AHA Stage D  Garces profile A    1. LVAD (left ventricular assist device) present    2. Heart replaced by heart assist device    3. Chronic combined systolic and diastolic heart failure    4. NICM (nonischemic cardiomyopathy)    5. Coronary artery disease involving native coronary artery of native heart without angina pectoris    6. ICD (implantable cardioverter-defibrillator) in place    7. Subarachnoid hemorrhage    8. Bilateral subdural hematomas    9. Paroxysmal atrial fibrillation    10. Mixed hyperlipidemia    11. Essential hypertension    12. Iron deficiency anemia due to chronic blood loss    13. Type 2 diabetes mellitus without complication, without long-term current use of insulin        PLAN:    We previously reduced lisinopril and amlodipine given orthostatic BP and symptoms and take Lasix PRN. He continues  to endorse orthostatic symptoms and labs and exam are suggestive of hypovolemia. At this time, will stop lisinopril and change Lasix back to PRN. Would repeat labs locally next week along with PSA.     CT evaluation has been unremarkable despite weight loss and night sweats. Has iron deficiency anemia with these symptoms and family history of colon cancer. Would refer to GI for further evaluation.     Recommend 2 gram sodium restriction and 1500 mL fluid restriction.  Encourage physical activity with graded exercise program.  Requested patient to weigh themselves daily, and to notify us if their weight increases by more than 3 lbs in 1 day or 5 lbs in 1 week.   Pt to call us withmore shortness of breath, swelling or unexpected weight changes, fever, chills, alarms, bloody or black bowel movements, or drainage from the driveline    Additionally, the patient has a patient centered goal of being able to improve their quality of life     F/U 3 months with clinic visit, labs and interrogation    Short-term goals: improve quality of life, appetite, prevent falls  Long-term goals: prolong meaningful life, currently DT VAD  Barriers: hypotension, weight loss    UNOS Patient Status  Functional Status: 80% - Normal activity with effort: some symptoms of disease  Physical Capacity: Limited Mobility  Working for Income: No  If no, reason not working: Unknown      Electronically signed by:   Dana Mendoza   04/04/2023 5:28 PM

## 2023-04-04 NOTE — PROGRESS NOTES
04/04/2023-Meme stated she is uncertain why patient missed INR on 4/3/23 and that patient will have INR on 4/5/23 while at [facility].

## 2023-04-05 ENCOUNTER — HOSPITAL ENCOUNTER (OUTPATIENT)
Dept: CARDIOLOGY | Facility: HOSPITAL | Age: 68
Discharge: HOME | End: 2023-04-05
Attending: INTERNAL MEDICINE
Payer: MEDICARE

## 2023-04-05 ENCOUNTER — INFUSION (OUTPATIENT)
Dept: CARDIOLOGY | Facility: HOSPITAL | Age: 68
End: 2023-04-05
Attending: INTERNAL MEDICINE
Payer: MEDICARE

## 2023-04-05 ENCOUNTER — OFFICE VISIT (OUTPATIENT)
Dept: TRANSPLANT | Facility: CLINIC | Age: 68
End: 2023-04-05
Payer: MEDICARE

## 2023-04-05 ENCOUNTER — CLINICAL SUPPORT (OUTPATIENT)
Dept: TRANSPLANT | Facility: CLINIC | Age: 68
End: 2023-04-05
Payer: MEDICARE

## 2023-04-05 ENCOUNTER — ANTI-COAG VISIT (OUTPATIENT)
Dept: CARDIOLOGY | Facility: CLINIC | Age: 68
End: 2023-04-05
Payer: MEDICARE

## 2023-04-05 VITALS
OXYGEN SATURATION: 98 % | SYSTOLIC BLOOD PRESSURE: 76 MMHG | TEMPERATURE: 98 F | BODY MASS INDEX: 20.67 KG/M2 | RESPIRATION RATE: 20 BRPM | HEIGHT: 74 IN | HEART RATE: 79 BPM

## 2023-04-05 VITALS — WEIGHT: 157.75 LBS | HEIGHT: 74 IN | TEMPERATURE: 98 F | BODY MASS INDEX: 20.24 KG/M2 | SYSTOLIC BLOOD PRESSURE: 72 MMHG

## 2023-04-05 VITALS — BODY MASS INDEX: 20.66 KG/M2 | HEIGHT: 74 IN | WEIGHT: 161 LBS

## 2023-04-05 DIAGNOSIS — I42.8 NICM (NONISCHEMIC CARDIOMYOPATHY): ICD-10-CM

## 2023-04-05 DIAGNOSIS — Z95.811 LVAD (LEFT VENTRICULAR ASSIST DEVICE) PRESENT: ICD-10-CM

## 2023-04-05 DIAGNOSIS — E11.9 TYPE 2 DIABETES MELLITUS WITHOUT COMPLICATION, WITHOUT LONG-TERM CURRENT USE OF INSULIN: ICD-10-CM

## 2023-04-05 DIAGNOSIS — I48.0 PAROXYSMAL ATRIAL FIBRILLATION: ICD-10-CM

## 2023-04-05 DIAGNOSIS — I50.42 CHRONIC COMBINED SYSTOLIC AND DIASTOLIC HEART FAILURE: ICD-10-CM

## 2023-04-05 DIAGNOSIS — D50.8 OTHER IRON DEFICIENCY ANEMIA: Primary | ICD-10-CM

## 2023-04-05 DIAGNOSIS — I25.10 CORONARY ARTERY DISEASE INVOLVING NATIVE CORONARY ARTERY OF NATIVE HEART WITHOUT ANGINA PECTORIS: ICD-10-CM

## 2023-04-05 DIAGNOSIS — Z95.811 HEART REPLACED BY HEART ASSIST DEVICE: ICD-10-CM

## 2023-04-05 DIAGNOSIS — I10 ESSENTIAL HYPERTENSION: ICD-10-CM

## 2023-04-05 DIAGNOSIS — Z95.811 LVAD (LEFT VENTRICULAR ASSIST DEVICE) PRESENT: Primary | ICD-10-CM

## 2023-04-05 DIAGNOSIS — I60.9 SUBARACHNOID HEMORRHAGE: ICD-10-CM

## 2023-04-05 DIAGNOSIS — S06.5XAA BILATERAL SUBDURAL HEMATOMAS: ICD-10-CM

## 2023-04-05 DIAGNOSIS — D50.0 IRON DEFICIENCY ANEMIA DUE TO CHRONIC BLOOD LOSS: ICD-10-CM

## 2023-04-05 DIAGNOSIS — Z95.810 ICD (IMPLANTABLE CARDIOVERTER-DEFIBRILLATOR) IN PLACE: ICD-10-CM

## 2023-04-05 DIAGNOSIS — E78.2 MIXED HYPERLIPIDEMIA: ICD-10-CM

## 2023-04-05 LAB
ASCENDING AORTA: 3.57 CM
BSA FOR ECHO PROCEDURE: 1.95 M2
CV ECHO LV RWT: 0.25 CM
DOP CALC LVOT AREA: 5.5 CM2
DOP CALC LVOT DIAMETER: 2.64 CM
E WAVE DECELERATION TIME: 215.59 MSEC
E/A RATIO: 0.85
E/E' RATIO: 7.14 M/S
ECHO LV POSTERIOR WALL: 0.81 CM (ref 0.6–1.1)
EJECTION FRACTION: 15 %
FRACTIONAL SHORTENING: 9 % (ref 28–44)
INTERVENTRICULAR SEPTUM: 0.65 CM (ref 0.6–1.1)
LA MAJOR: 4.12 CM
LA MINOR: 4.11 CM
LA WIDTH: 4.66 CM
LEFT ATRIUM SIZE: 2.94 CM
LEFT ATRIUM VOLUME INDEX MOD: 37 ML/M2
LEFT ATRIUM VOLUME INDEX: 24.2 ML/M2
LEFT ATRIUM VOLUME MOD: 73.17 CM3
LEFT ATRIUM VOLUME: 47.92 CM3
LEFT INTERNAL DIMENSION IN SYSTOLE: 5.87 CM (ref 2.1–4)
LEFT VENTRICLE DIASTOLIC VOLUME INDEX: 108.44 ML/M2
LEFT VENTRICLE DIASTOLIC VOLUME: 214.72 ML
LEFT VENTRICLE MASS INDEX: 96 G/M2
LEFT VENTRICLE SYSTOLIC VOLUME INDEX: 86.5 ML/M2
LEFT VENTRICLE SYSTOLIC VOLUME: 171.22 ML
LEFT VENTRICULAR INTERNAL DIMENSION IN DIASTOLE: 6.48 CM (ref 3.5–6)
LEFT VENTRICULAR MASS: 190.69 G
LV LATERAL E/E' RATIO: 5.56 M/S
LV SEPTAL E/E' RATIO: 10 M/S
MV PEAK A VEL: 0.59 M/S
MV PEAK E VEL: 0.5 M/S
MV STENOSIS PRESSURE HALF TIME: 62.52 MS
MV VALVE AREA P 1/2 METHOD: 3.52 CM2
PISA TR MAX VEL: 2.44 M/S
RA MAJOR: 3.98 CM
RA PRESSURE: 8 MMHG
RA WIDTH: 4.28 CM
RIGHT VENTRICULAR END-DIASTOLIC DIMENSION: 4.29 CM
RV TISSUE DOPPLER FREE WALL SYSTOLIC VELOCITY 1 (APICAL 4 CHAMBER VIEW): 6.68 CM/S
SINUS: 4.06 CM
STJ: 3.39 CM
TDI LATERAL: 0.09 M/S
TDI SEPTAL: 0.05 M/S
TDI: 0.07 M/S
TR MAX PG: 24 MMHG
TRICUSPID ANNULAR PLANE SYSTOLIC EXCURSION: 1.44 CM
TV REST PULMONARY ARTERY PRESSURE: 32 MMHG

## 2023-04-05 PROCEDURE — 93306 TTE W/DOPPLER COMPLETE: CPT | Mod: 26,,, | Performed by: INTERNAL MEDICINE

## 2023-04-05 PROCEDURE — 96365 THER/PROPH/DIAG IV INF INIT: CPT | Mod: 59

## 2023-04-05 PROCEDURE — 99214 OFFICE O/P EST MOD 30 MIN: CPT | Mod: S$GLB,,, | Performed by: INTERNAL MEDICINE

## 2023-04-05 PROCEDURE — 99213 PR OFFICE/OUTPT VISIT, EST, LEVL III, 20-29 MIN: ICD-10-PCS | Mod: ,,, | Performed by: INTERNAL MEDICINE

## 2023-04-05 PROCEDURE — 3008F PR BODY MASS INDEX (BMI) DOCUMENTED: ICD-10-PCS | Mod: CPTII,S$GLB,, | Performed by: INTERNAL MEDICINE

## 2023-04-05 PROCEDURE — 99213 OFFICE O/P EST LOW 20 MIN: CPT | Mod: ,,, | Performed by: INTERNAL MEDICINE

## 2023-04-05 PROCEDURE — 3008F BODY MASS INDEX DOCD: CPT | Mod: CPTII,S$GLB,, | Performed by: INTERNAL MEDICINE

## 2023-04-05 PROCEDURE — 1101F PR PT FALLS ASSESS DOC 0-1 FALLS W/OUT INJ PAST YR: ICD-10-PCS | Mod: CPTII,S$GLB,, | Performed by: INTERNAL MEDICINE

## 2023-04-05 PROCEDURE — 1123F ACP DISCUSS/DSCN MKR DOCD: CPT | Mod: CPTII,S$GLB,, | Performed by: INTERNAL MEDICINE

## 2023-04-05 PROCEDURE — 99214 PR OFFICE/OUTPT VISIT, EST, LEVL IV, 30-39 MIN: ICD-10-PCS | Mod: S$GLB,,, | Performed by: INTERNAL MEDICINE

## 2023-04-05 PROCEDURE — 25000003 PHARM REV CODE 250: Performed by: INTERNAL MEDICINE

## 2023-04-05 PROCEDURE — 3288F FALL RISK ASSESSMENT DOCD: CPT | Mod: CPTII,S$GLB,, | Performed by: INTERNAL MEDICINE

## 2023-04-05 PROCEDURE — 63600175 PHARM REV CODE 636 W HCPCS: Mod: JZ,JG | Performed by: INTERNAL MEDICINE

## 2023-04-05 PROCEDURE — 1126F AMNT PAIN NOTED NONE PRSNT: CPT | Mod: CPTII,S$GLB,, | Performed by: INTERNAL MEDICINE

## 2023-04-05 PROCEDURE — 1126F PR PAIN SEVERITY QUANTIFIED, NO PAIN PRESENT: ICD-10-PCS | Mod: CPTII,S$GLB,, | Performed by: INTERNAL MEDICINE

## 2023-04-05 PROCEDURE — 99999 PR PBB SHADOW E&M-EST. PATIENT-LVL III: ICD-10-PCS | Mod: PBBFAC,,, | Performed by: INTERNAL MEDICINE

## 2023-04-05 PROCEDURE — 1123F PR ADV CARE PLAN DISCUSSED, PLAN OR SURROGATE DOCUMENTED: ICD-10-PCS | Mod: CPTII,S$GLB,, | Performed by: INTERNAL MEDICINE

## 2023-04-05 PROCEDURE — 1160F RVW MEDS BY RX/DR IN RCRD: CPT | Mod: CPTII,S$GLB,, | Performed by: INTERNAL MEDICINE

## 2023-04-05 PROCEDURE — 93750 PR INTERROGATE VENT ASSIST DEV, IN PERSON, W PHYSICIAN ANALYSIS: ICD-10-PCS | Mod: S$GLB,,, | Performed by: INTERNAL MEDICINE

## 2023-04-05 PROCEDURE — 93306 ECHO (CUPID ONLY): ICD-10-PCS | Mod: 26,,, | Performed by: INTERNAL MEDICINE

## 2023-04-05 PROCEDURE — 3288F PR FALLS RISK ASSESSMENT DOCUMENTED: ICD-10-PCS | Mod: CPTII,S$GLB,, | Performed by: INTERNAL MEDICINE

## 2023-04-05 PROCEDURE — 1160F PR REVIEW ALL MEDS BY PRESCRIBER/CLIN PHARMACIST DOCUMENTED: ICD-10-PCS | Mod: CPTII,S$GLB,, | Performed by: INTERNAL MEDICINE

## 2023-04-05 PROCEDURE — 1101F PT FALLS ASSESS-DOCD LE1/YR: CPT | Mod: CPTII,S$GLB,, | Performed by: INTERNAL MEDICINE

## 2023-04-05 PROCEDURE — 93306 TTE W/DOPPLER COMPLETE: CPT

## 2023-04-05 PROCEDURE — 96367 TX/PROPH/DG ADDL SEQ IV INF: CPT

## 2023-04-05 PROCEDURE — 99999 PR PBB SHADOW E&M-EST. PATIENT-LVL III: CPT | Mod: PBBFAC,,, | Performed by: INTERNAL MEDICINE

## 2023-04-05 PROCEDURE — 1159F MED LIST DOCD IN RCRD: CPT | Mod: CPTII,S$GLB,, | Performed by: INTERNAL MEDICINE

## 2023-04-05 PROCEDURE — 93750 INTERROGATION VAD IN PERSON: CPT | Mod: S$GLB,,, | Performed by: INTERNAL MEDICINE

## 2023-04-05 PROCEDURE — 1159F PR MEDICATION LIST DOCUMENTED IN MEDICAL RECORD: ICD-10-PCS | Mod: CPTII,S$GLB,, | Performed by: INTERNAL MEDICINE

## 2023-04-05 RX ORDER — EPINEPHRINE 0.3 MG/.3ML
0.3 INJECTION SUBCUTANEOUS ONCE AS NEEDED
OUTPATIENT
Start: 2023-04-05

## 2023-04-05 RX ORDER — METHYLPREDNISOLONE SOD SUCC 125 MG
125 VIAL (EA) INJECTION ONCE AS NEEDED
OUTPATIENT
Start: 2023-04-05

## 2023-04-05 RX ORDER — DIPHENHYDRAMINE HYDROCHLORIDE 50 MG/ML
50 INJECTION INTRAMUSCULAR; INTRAVENOUS ONCE AS NEEDED
OUTPATIENT
Start: 2023-04-05

## 2023-04-05 RX ORDER — HEPARIN 100 UNIT/ML
500 SYRINGE INTRAVENOUS
OUTPATIENT
Start: 2023-04-05

## 2023-04-05 RX ORDER — SODIUM CHLORIDE 0.9 % (FLUSH) 0.9 %
10 SYRINGE (ML) INJECTION
Status: DISCONTINUED | OUTPATIENT
Start: 2023-04-05 | End: 2023-04-05 | Stop reason: HOSPADM

## 2023-04-05 RX ORDER — SODIUM CHLORIDE 0.9 % (FLUSH) 0.9 %
10 SYRINGE (ML) INJECTION
Status: CANCELLED | OUTPATIENT
Start: 2023-04-05

## 2023-04-05 RX ADMIN — FERUMOXYTOL 510 MG: 510 INJECTION INTRAVENOUS at 11:04

## 2023-04-05 RX ADMIN — SODIUM CHLORIDE: 9 INJECTION, SOLUTION INTRAVENOUS at 11:04

## 2023-04-05 NOTE — LETTER
April 5, 2023        Teresa Mendoza  [address]  [address]  [facility] [address]  Phone: [phone]  Fax: [phone]             [facility]  [address]  [place] [address]  Phone: [phone]   Patient: Rocco France   MR Number: [id number]   YOB: 1955   Date of Visit: 4/5/2023       Dear Dr. Teresa Mendoza    Thank you for referring Rocco France to me for evaluation. Attached you will find relevant portions of my assessment and plan of care.    If you have questions, please do not hesitate to call me. I look forward to following Rocco France along with you.    Sincerely,    Dana Mendoza, DO    Enclosure    If you would like to receive this communication electronically, please contact [email or web address] or [phone] to request [employer] access.    [employer] is a tool which provides read-only access to select patient information with whom you have a relationship. Its easy to use and provides real time access to review your patients record including encounter summaries, notes, results, and demographic information.    If you feel you have received this communication in error or would no longer like to receive these types of communications, please e-mail [email or web address]

## 2023-04-05 NOTE — PROGRESS NOTES
Arrived here for 1st Iron infusion. AAox3,respiration unlabored,vss. Explained procedure and protocol. Time given for questions and answers given. Notified [facility] Noni Clark NP as ordered. Iv started and tolerated.  Iron infusing over 30 minutes per MD orders. No adverse reaction noted/reported. Handout given in hand and discussed.  Vss,reps unlabored throughout infusion.  Requested appt around the 1st week in May. Will call pt back with appt.

## 2023-04-05 NOTE — PROGRESS NOTES
Date of Implant with Heartmate 3 LVAD: 1/13/2021    PATIENT ARRIVED IN CLINIC:  Ambulatory   Accompanied by: self  Complaints/reason for visit today: routine    Vitals  Temperature, oral:   Temp Readings from Last 1 Encounters:   04/05/23 97.8 °F (36.6 °C) (Oral)     Blood Pressure:   BP Readings from Last 3 Encounters:   04/05/23 (!) 72/0   03/01/23 (!) 105/58   01/26/23 (!) 72/0        VAD Interrogation:  TXP JACEY INTERROGATIONS 4/5/2023 1/26/2023 9/16/2022   Type HeartMate3 HeartMate3 -   Flow 4.1 4.4 -   Speed 5200 5200 -   PI 6.6 5.8 -   Power (Edwards) 3.7 3.8 -   LSL 4800 4800 -   Pulsatility No Pulse No Pulse Pulse     HCT:   Lab Results   Component Value Date    HCT 29.8 (L) 04/05/2023    HCT 30 (L) 03/22/2022       Problems / Issues / Alarms with VAD if any: None noted  VAD Sounds: HM3 Smooth      Equipment:  Emergency Equipment With Patient: yes   Any Equipment Issues: None noted   It is medically necessary to ensure patient has properly functioning equipment and wearables to prevent infection, injury or death to patient.     DLES Assessment:  Appearance Of Driveline: 1  Antibiotics: NO  Velour: no  Manual & Visual Inspection Of Driveline: No kinks or tears noted  Stabilization Device In Use: yes, [place] securement device    Heartmate 3 Module Cable:  No yellow exposed and Attempted to unscrew modular cable to ensure it will be able to come lose in the event we ever need to change the modular cable while patient held the driveline in place so it would not move. Modular cable connection able to be unscrewed and re-tightened. Instructed pt to perform this weekly.    Patient MyChart Questionnaire: No flowsheet data found.     Assessment:   PAIN: NO  Complaints Of Nausea / Vomiting: None noted    Appearance and Frequency Of Stools: normal and formed without blood & daily  Color Of Urine: clear/yellow  Coping/Depression/Anxiety: coping okay  Sleep Habits: 4 hrs /night  Sleep Aids:  mirtazapine  Showering: Yes,  reminded to change dressing immediately after drying off  Activity/Exercise: walking, fishing   Driving: Yes. Reminded to pull over should there be an alarm before looking down at controller.    DLES Dressing Care:   Frequency of Dressing Changes: every other day & daily kit  Pt In Need Of Management Kits?:no   It is medically necessary to have VAD management kits in order to prevent infection or to assist in the healing of an infected DLES.    Labs:    Chemistry        Component Value Date/Time     (L) 04/05/2023 0720    K 4.0 04/05/2023 0720     04/05/2023 0720    CO2 26 04/05/2023 0720    BUN 24 (H) 04/05/2023 0720    CREATININE 1.6 (H) 04/05/2023 0720    GLU 91 04/05/2023 0720        Component Value Date/Time    CALCIUM 9.4 04/05/2023 0720    ALKPHOS 113 04/05/2023 0720    AST 13 04/05/2023 0720    ALT 6 (L) 04/05/2023 0720    BILITOT 0.7 04/05/2023 0720    ESTGFRAFRICA 55.3 (A) 07/13/2022 0834    EGFRNONAA 47.8 (A) 07/13/2022 0834            Magnesium   Date Value Ref Range Status   04/05/2023 2.1 1.6 - 2.6 mg/dL Final       Lab Results   Component Value Date    WBC 7.92 04/05/2023    HGB 9.0 (L) 04/05/2023    HCT 29.8 (L) 04/05/2023    MCV 88 04/05/2023     04/05/2023       Lab Results   Component Value Date    INR 1.8 (H) 04/05/2023    INR 2.5 03/27/2023    INR 2.9 03/23/2023       BNP   Date Value Ref Range Status   04/05/2023 108 (H) 0 - 99 pg/mL Final     Comment:     Values of less than 100 pg/ml are consistent with non-CHF populations.   01/26/2023 122 (H) 0 - 99 pg/mL Final     Comment:     Values of less than 100 pg/ml are consistent with non-CHF populations.   09/16/2022 382 (H) 0 - 99 pg/mL Final     Comment:     Values of less than 100 pg/ml are consistent with non-CHF populations.       LD   Date Value Ref Range Status   04/05/2023 165 110 - 260 U/L Final     Comment:     Results are increased in hemolyzed samples.   01/26/2023 140 110 - 260 U/L Final     Comment:     Results are  increased in hemolyzed samples.   09/16/2022 153 110 - 260 U/L Final     Comment:     Results are increased in hemolyzed samples.       Labs reviewed with patient: YES     Medication Reconciliation: per MA.  New Medication Detail Provided: yes  Coumadin Managed by: [facility]    Education: Reviewed driveline care, emergency procedures, how to change the controller, alarms with patient, as well as discussed how to page the VAD coordinator in case of an emergency.   Educated patient/family that chest compressions are allowed in the event they are needed.    Reminded patient/caregiver not to touch their face and to cover their mouth when they cough or sneeze.   Vaccines: Pt informed that we are encouraging all VAD patients to receive the Flu and COVID vaccines and boosters. Informed pt that they can take tylenol but should avoid other NSAIDs.       Plans/Needs: Routine f/u w/ Dr Mendoza. Patient reports feeling well overall       Hurricane Season: No

## 2023-04-05 NOTE — PROGRESS NOTES
Patient was seen in clinic today. His backup controller was charged and self test passed.    Equipment:  Any Equipment Issues: None noted     Emergency Bag  Emergency Equipment With Patient: yes   Condition of emergency bag: Good  Contents of emergency bag: Backup controller, 2 batteries, 2 clips, reference cards    Equipment Inspection  Inspected patient's equipment today: yes  Equipment clean and free of debris, including locking mechanism: yes  Cleaned equipment today and educated patient on how to do this: yes    Backup Controller and Emergency Backup Battery  Backup controller serial number: [id number]  EBB due to be charged: 10/2023  EBB charged today and self test completed: yes  Self test passed:  yes    It is medically necessary to ensure patient has properly functioning equipment and wearables to prevent infection, injury or death to patient.

## 2023-04-05 NOTE — PROGRESS NOTES
Patient reports taking Warfarin 5mg on 3/30/23 and Warfarin 2.5mg on 4/3/23, took correct dose all other days, diarrhea 4/2/23, increased stress, no other changes reported.

## 2023-04-09 NOTE — PROGRESS NOTES
Subjective:     Mr. Rocco France is a 67 y.o. male  presenting to the infusion suite for iron infusion.      HISTORY OF PRESENT ILLNESS:  Mr. France has a medical history remarkable for non-ischemic cardiomyopathy status post DT HM3 implantation 1/13/2021 with sternal closure 1/14/2021, with unremarkable post-operative course. Other history includes DM II, iron deficiency anemia.    His most recent LVAD clinic visit was today.   Since their last clinic visit, he has been doing well. Doppler 72 at 5200 RPM, DLES 1. He is continuing to lose weight despite starting Remeron and does not have an appetite. Feels low energy and is not able to do more enjoyable activities. He mostly eats breakfast and only gets four hours of sleep at night and naps during the day. Creatinine is higher at 1.6 today with rise in BUN as well and notes he has not been drinking much. Speed has been increased from 5100 to 5200 more recently 9/2022 during admission for drop in Hb. GI did not perform any intervention at the time and he was started on PO iron.    Notes night sweats that have been ongoing since months without fevers or chills and progressive weight loss.   No syncope but multiple presyncopal episodes without recent falls. Denies SOB with current activity, orthopnea, PND, bendopnea, abdominal distension, early satiety, nausea, lower extremity edema, chest discomfort, presyncope, palpitations, or syncope. Denies adverse bleeding, no hematochezia/melena/hematuria/hematemesis. Notes some night sweats with low appetite that is new but not frequent with progressive weight loss.     Mr. France is here for #1 of 2 iron infusions to address iron deficiency anemia.     Past Medical History:   Diagnosis Date    Acute respiratory failure requiring reintubation 1/28/2022    CLARISSE (acute kidney injury) 1/11/2021    Diabetes mellitus     Kidney stones        Current Outpatient Medications   Medication Sig Dispense Refill    amLODIPine (NORVASC) 5 MG  "tablet Take 1 tablet (5 mg total) by mouth once daily. 30 tablet 5    atorvastatin (LIPITOR) 80 MG tablet Take 1 tablet by mouth once daily 90 tablet 0    digoxin (LANOXIN) 125 mcg tablet Take 1 tablet (0.125 mg total) by mouth once daily. 90 tablet 3    docusate sodium (COLACE) 100 MG capsule Take 100 mg by mouth Daily.      ferrous gluconate 324 mg (37.5 mg iron) Tab tablet Take 1 tablet (324 mg total) by mouth daily with breakfast. 30 tablet 11    furosemide (LASIX) 40 MG tablet Take 1 tablet (40 mg total) by mouth once daily at 6am. 30 tablet 11    lisinopriL (PRINIVIL,ZESTRIL) 5 MG tablet Take 1 tablet (5 mg total) by mouth once daily. 90 tablet 3    magnesium oxide (MAG-OX) 400 mg (241.3 mg magnesium) tablet Take 1 tablet (400 mg total) by mouth 3 (three) times daily. 90 tablet 11    mirtazapine (REMERON) 30 MG tablet Take 1 tablet (30 mg total) by mouth every evening. 30 tablet 11    SITagliptin (JANUVIA) 100 MG Tab Take 1 tablet (100 mg total) by mouth once daily. (Patient not taking: Reported on 4/5/2023) 90 tablet 0    spironolactone (ALDACTONE) 25 MG tablet Take 1 tablet (25 mg total) by mouth once daily. 30 tablet 11    warfarin (COUMADIN) 5 MG tablet Take 1 tablet (5 mg total) by mouth Daily. 35 tablet 5     No current facility-administered medications for this visit.     Review of patient's allergies indicates:   Allergen Reactions    Pcn [penicillins] Hives     There are no hospital problems to display for this patient.    Temp: 97.8 °F (36.6 °C) (04/05/23 1145)  Pulse: 79 (04/05/23 1145)  Resp: 20 (04/05/23 1145)  BP: (!) 76/0 (04/05/23 1145)  SpO2: 98 % (04/05/23 1145)  Height: 6' 2" (188 cm) (04/05/23 1145)    Review of Systems   Constitutional:  Positive for malaise/fatigue and weight loss.   HENT: Negative.     Eyes: Negative.    Cardiovascular: Negative.    Gastrointestinal: Negative.    Musculoskeletal: Negative.    Skin: Negative.    Neurological: Negative.      Physical Exam  Constitutional:  "      General: He is not in acute distress.     Appearance: Normal appearance. He is not ill-appearing.   HENT:      Head: Normocephalic.   Cardiovascular:      Comments: VAD Hum present  Pulmonary:      Effort: Pulmonary effort is normal.      Breath sounds: Normal breath sounds.   Abdominal:      Palpations: Abdomen is soft.   Musculoskeletal:         General: Normal range of motion.      Cervical back: Normal range of motion.   Skin:     General: Skin is warm and dry.   Neurological:      Mental Status: He is oriented to person, place, and time.   Psychiatric:         Mood and Affect: Mood normal.         Behavior: Behavior normal.         Chemistry        Component Value Date/Time     (L) 04/05/2023 0720    K 4.0 04/05/2023 0720     04/05/2023 0720    CO2 26 04/05/2023 0720    BUN 24 (H) 04/05/2023 0720    CREATININE 1.6 (H) 04/05/2023 0720    GLU 91 04/05/2023 0720        Component Value Date/Time    CALCIUM 9.4 04/05/2023 0720    ALKPHOS 113 04/05/2023 0720    AST 13 04/05/2023 0720    ALT 6 (L) 04/05/2023 0720    BILITOT 0.7 04/05/2023 0720    ESTGFRAFRICA 55.3 (A) 07/13/2022 0834    EGFRNONAA 47.8 (A) 07/13/2022 0834            Magnesium   Date Value Ref Range Status   04/05/2023 2.1 1.6 - 2.6 mg/dL Final       Lab Results   Component Value Date    WBC 7.92 04/05/2023    HGB 9.0 (L) 04/05/2023    HCT 29.8 (L) 04/05/2023    MCV 88 04/05/2023     04/05/2023       Lab Results   Component Value Date    INR 1.8 (H) 04/05/2023    INR 2.5 03/27/2023    INR 2.9 03/23/2023       BNP   Date Value Ref Range Status   04/05/2023 108 (H) 0 - 99 pg/mL Final     Comment:     Values of less than 100 pg/ml are consistent with non-CHF populations.   01/26/2023 122 (H) 0 - 99 pg/mL Final     Comment:     Values of less than 100 pg/ml are consistent with non-CHF populations.   09/16/2022 382 (H) 0 - 99 pg/mL Final     Comment:     Values of less than 100 pg/ml are consistent with non-CHF populations.       LD    Date Value Ref Range Status   04/05/2023 165 110 - 260 U/L Final     Comment:     Results are increased in hemolyzed samples.   01/26/2023 140 110 - 260 U/L Final     Comment:     Results are increased in hemolyzed samples.   09/16/2022 153 110 - 260 U/L Final     Comment:     Results are increased in hemolyzed samples.     Assessment & Plan    1. Other iron deficiency anemia    2. LVAD (left ventricular assist device) present      Plan: Schedule second iron infusion first week of May.    Karma Clark  4/9/2023

## 2023-04-10 ENCOUNTER — ANTI-COAG VISIT (OUTPATIENT)
Dept: CARDIOLOGY | Facility: CLINIC | Age: 68
End: 2023-04-10
Payer: MEDICARE

## 2023-04-10 ENCOUNTER — TELEPHONE (OUTPATIENT)
Dept: TRANSPLANT | Facility: CLINIC | Age: 68
End: 2023-04-10
Payer: MEDICARE

## 2023-04-10 DIAGNOSIS — Z95.811 LVAD (LEFT VENTRICULAR ASSIST DEVICE) PRESENT: Primary | ICD-10-CM

## 2023-04-10 LAB — INR PPP: 2

## 2023-04-10 PROCEDURE — 93793 ANTICOAG MGMT PT WARFARIN: CPT | Mod: S$GLB,,,

## 2023-04-10 PROCEDURE — 93793 PR ANTICOAGULANT MGMT FOR PT TAKING WARFARIN: ICD-10-PCS | Mod: S$GLB,,,

## 2023-04-10 NOTE — TELEPHONE ENCOUNTER
Faxed outpatient lab order to   [facility]  [phone]    PSA,BNP,CMP R/T INCREASED CREATINE  AND CANCER SCREENING.    ICH

## 2023-04-12 ENCOUNTER — EXTERNAL HOME HEALTH (OUTPATIENT)
Dept: HOME HEALTH SERVICES | Facility: HOSPITAL | Age: 68
End: 2023-04-12
Payer: MEDICARE

## 2023-04-14 ENCOUNTER — TELEPHONE (OUTPATIENT)
Dept: TRANSPLANT | Facility: CLINIC | Age: 68
End: 2023-04-14
Payer: MEDICARE

## 2023-04-14 PROCEDURE — G0179 PR HOME HEALTH MD RECERTIFICATION: ICD-10-PCS | Mod: ,,, | Performed by: INTERNAL MEDICINE

## 2023-04-14 PROCEDURE — G0179 MD RECERTIFICATION HHA PT: HCPCS | Mod: ,,, | Performed by: INTERNAL MEDICINE

## 2023-04-14 NOTE — TELEPHONE ENCOUNTER
----- Message from Cira Izquierdo LPN sent at 4/10/2023  9:36 AM CDT -----  Regarding: PSA, BNP,CMP INCREASE CR AND CANCER SCREENING  [facility]  [phone]

## 2023-04-17 ENCOUNTER — ANTI-COAG VISIT (OUTPATIENT)
Dept: CARDIOLOGY | Facility: CLINIC | Age: 68
End: 2023-04-17
Payer: MEDICARE

## 2023-04-17 DIAGNOSIS — Z95.811 LVAD (LEFT VENTRICULAR ASSIST DEVICE) PRESENT: Primary | ICD-10-CM

## 2023-04-17 LAB — INR PPP: 2.1

## 2023-04-17 PROCEDURE — 93793 PR ANTICOAGULANT MGMT FOR PT TAKING WARFARIN: ICD-10-PCS | Mod: S$GLB,,,

## 2023-04-17 PROCEDURE — 93793 ANTICOAG MGMT PT WARFARIN: CPT | Mod: S$GLB,,,

## 2023-04-21 ENCOUNTER — TELEPHONE (OUTPATIENT)
Dept: TRANSPLANT | Facility: CLINIC | Age: 68
End: 2023-04-21
Payer: MEDICARE

## 2023-04-21 NOTE — TELEPHONE ENCOUNTER
Signed Home Health Orders has been faxed to [facility]  Date 4/21/23  Order Number(s): [id number]

## 2023-04-24 ENCOUNTER — TELEPHONE (OUTPATIENT)
Dept: TRANSPLANT | Facility: CLINIC | Age: 68
End: 2023-04-24
Payer: MEDICARE

## 2023-04-24 ENCOUNTER — ANTI-COAG VISIT (OUTPATIENT)
Dept: CARDIOLOGY | Facility: CLINIC | Age: 68
End: 2023-04-24
Payer: MEDICARE

## 2023-04-24 DIAGNOSIS — Z95.811 LVAD (LEFT VENTRICULAR ASSIST DEVICE) PRESENT: Primary | ICD-10-CM

## 2023-04-24 LAB
EXT ALBUMIN: 2.5
EXT ALT: 8
EXT AST: 15
EXT BILIRUBIN TOTAL: 1.2
EXT BUN: 15
EXT CALCIUM: 9.3
EXT CHLORIDE: 102
EXT CREATININE: 1.3 MG/DL
EXT GLUCOSE: 156
EXT POTASSIUM: 4
EXT PROTEIN TOTAL: 9.7
EXT SODIUM: 138 MMOL/L
INR PPP: 2.3
PSA: 1.8

## 2023-04-24 PROCEDURE — 93793 ANTICOAG MGMT PT WARFARIN: CPT | Mod: S$GLB,,,

## 2023-04-24 PROCEDURE — 93793 PR ANTICOAGULANT MGMT FOR PT TAKING WARFARIN: ICD-10-PCS | Mod: S$GLB,,,

## 2023-05-01 ENCOUNTER — ANTI-COAG VISIT (OUTPATIENT)
Dept: CARDIOLOGY | Facility: CLINIC | Age: 68
End: 2023-05-01
Payer: MEDICARE

## 2023-05-01 DIAGNOSIS — Z95.811 LVAD (LEFT VENTRICULAR ASSIST DEVICE) PRESENT: Primary | ICD-10-CM

## 2023-05-01 LAB — INR PPP: 3.1

## 2023-05-01 PROCEDURE — 93793 PR ANTICOAGULANT MGMT FOR PT TAKING WARFARIN: ICD-10-PCS | Mod: S$GLB,,,

## 2023-05-01 PROCEDURE — 93793 ANTICOAG MGMT PT WARFARIN: CPT | Mod: S$GLB,,,

## 2023-05-02 NOTE — PROGRESS NOTES
5/02/23 Patient called and verified correct warfarin dosage, reports 6 days ago he blew his nose and noticed a clot w/the mucous on tissue, appetite low this past week -not eating as much, has bumps on his head -seeing  5/10, having Iron Infusion 5/03,

## 2023-05-03 ENCOUNTER — INFUSION (OUTPATIENT)
Dept: CARDIOLOGY | Facility: HOSPITAL | Age: 68
End: 2023-05-03
Attending: INTERNAL MEDICINE
Payer: MEDICARE

## 2023-05-03 VITALS
RESPIRATION RATE: 18 BRPM | DIASTOLIC BLOOD PRESSURE: 77 MMHG | HEIGHT: 74 IN | OXYGEN SATURATION: 98 % | SYSTOLIC BLOOD PRESSURE: 109 MMHG | HEART RATE: 87 BPM | WEIGHT: 154.31 LBS | BODY MASS INDEX: 19.8 KG/M2 | TEMPERATURE: 98 F

## 2023-05-03 DIAGNOSIS — I50.42 CHRONIC COMBINED SYSTOLIC AND DIASTOLIC HEART FAILURE: ICD-10-CM

## 2023-05-03 DIAGNOSIS — D50.0 IRON DEFICIENCY ANEMIA DUE TO CHRONIC BLOOD LOSS: Primary | ICD-10-CM

## 2023-05-03 DIAGNOSIS — Z95.811 LVAD (LEFT VENTRICULAR ASSIST DEVICE) PRESENT: ICD-10-CM

## 2023-05-03 PROCEDURE — 25000003 PHARM REV CODE 250: Performed by: INTERNAL MEDICINE

## 2023-05-03 PROCEDURE — 96366 THER/PROPH/DIAG IV INF ADDON: CPT

## 2023-05-03 PROCEDURE — 99213 PR OFFICE/OUTPT VISIT, EST, LEVL III, 20-29 MIN: ICD-10-PCS | Mod: ,,, | Performed by: INTERNAL MEDICINE

## 2023-05-03 PROCEDURE — 99213 OFFICE O/P EST LOW 20 MIN: CPT | Mod: ,,, | Performed by: INTERNAL MEDICINE

## 2023-05-03 PROCEDURE — 63600175 PHARM REV CODE 636 W HCPCS: Mod: JZ,JG | Performed by: INTERNAL MEDICINE

## 2023-05-03 PROCEDURE — 96365 THER/PROPH/DIAG IV INF INIT: CPT

## 2023-05-03 RX ORDER — DIPHENHYDRAMINE HYDROCHLORIDE 50 MG/ML
50 INJECTION INTRAMUSCULAR; INTRAVENOUS ONCE AS NEEDED
OUTPATIENT
Start: 2023-05-03

## 2023-05-03 RX ORDER — EPINEPHRINE 0.3 MG/.3ML
0.3 INJECTION SUBCUTANEOUS ONCE AS NEEDED
OUTPATIENT
Start: 2023-05-03

## 2023-05-03 RX ORDER — SODIUM CHLORIDE 0.9 % (FLUSH) 0.9 %
10 SYRINGE (ML) INJECTION
Status: DISCONTINUED | OUTPATIENT
Start: 2023-05-03 | End: 2023-05-03 | Stop reason: HOSPADM

## 2023-05-03 RX ORDER — SODIUM CHLORIDE 0.9 % (FLUSH) 0.9 %
10 SYRINGE (ML) INJECTION
Status: CANCELLED | OUTPATIENT
Start: 2023-05-03

## 2023-05-03 RX ORDER — METHYLPREDNISOLONE SOD SUCC 125 MG
125 VIAL (EA) INJECTION ONCE AS NEEDED
OUTPATIENT
Start: 2023-05-03

## 2023-05-03 RX ORDER — HEPARIN 100 UNIT/ML
500 SYRINGE INTRAVENOUS
OUTPATIENT
Start: 2023-05-03

## 2023-05-03 RX ADMIN — FERUMOXYTOL 510 MG: 510 INJECTION INTRAVENOUS at 08:05

## 2023-05-03 RX ADMIN — SODIUM CHLORIDE: 9 INJECTION, SOLUTION INTRAVENOUS at 08:05

## 2023-05-03 NOTE — PROGRESS NOTES
Arrived here for 2nd Iron infusion. AAOx3,respiration unlabored,vss. Explained procedure and protocol. Time given for questions and answers given. Notified Midlevel Noni Clark NP as ordered. Iv started and tolerated.  Will continue to monitor Iron infusing over 30 minutes per MD orders. No adverse reaction noted/reported. Handout given in hand and discussed.  Vss,reps unlabored throughout infusion.  Notified LVAD team via secure chat that the pt has received his 2nd Iron infusion.

## 2023-05-03 NOTE — PLAN OF CARE
Problem: Adult Inpatient Plan of Care  Goal: Plan of Care Review  Outcome: Ongoing, Progressing     Problem: Fatigue  Goal: Improved Activity Tolerance  Outcome: Ongoing, Progressing     Problem: Anemia  Goal: Anemia Symptom Improvement  Outcome: Ongoing, Progressing

## 2023-05-09 ENCOUNTER — ANTI-COAG VISIT (OUTPATIENT)
Dept: CARDIOLOGY | Facility: CLINIC | Age: 68
End: 2023-05-09
Payer: MEDICARE

## 2023-05-09 DIAGNOSIS — Z95.811 LVAD (LEFT VENTRICULAR ASSIST DEVICE) PRESENT: Primary | ICD-10-CM

## 2023-05-09 LAB — INR PPP: 2.2

## 2023-05-09 PROCEDURE — 93793 PR ANTICOAGULANT MGMT FOR PT TAKING WARFARIN: ICD-10-PCS | Mod: S$GLB,,,

## 2023-05-09 PROCEDURE — 93793 ANTICOAG MGMT PT WARFARIN: CPT | Mod: S$GLB,,,

## 2023-05-15 ENCOUNTER — ANTI-COAG VISIT (OUTPATIENT)
Dept: CARDIOLOGY | Facility: CLINIC | Age: 68
End: 2023-05-15
Payer: MEDICARE

## 2023-05-15 DIAGNOSIS — Z95.811 LVAD (LEFT VENTRICULAR ASSIST DEVICE) PRESENT: Primary | ICD-10-CM

## 2023-05-15 LAB — INR PPP: 2.3

## 2023-05-15 PROCEDURE — 93793 ANTICOAG MGMT PT WARFARIN: CPT | Mod: S$GLB,,,

## 2023-05-15 PROCEDURE — 93793 PR ANTICOAGULANT MGMT FOR PT TAKING WARFARIN: ICD-10-PCS | Mod: S$GLB,,,

## 2023-05-15 NOTE — PROGRESS NOTES
Miguelito at [facility] gave verbal read as PT 22.8, INR 2.3, dated for 05/15/2023, hard copy to be faxed.

## 2023-05-16 ENCOUNTER — TELEPHONE (OUTPATIENT)
Dept: TRANSPLANT | Facility: CLINIC | Age: 68
End: 2023-05-16
Payer: MEDICARE

## 2023-05-16 NOTE — PROGRESS NOTES
INR at goal. Medications and chart reviewed. No changes noted to necessitate adjustment of warfarin or follow-up plan. See calendar.

## 2023-05-16 NOTE — TELEPHONE ENCOUNTER
Signed Home Health Orders has been faxed to [facility]  Date 05/16/23  Order Number(s): [id number]

## 2023-05-18 ENCOUNTER — TELEPHONE (OUTPATIENT)
Dept: TRANSPLANT | Facility: CLINIC | Age: 68
End: 2023-05-18
Payer: MEDICARE

## 2023-05-18 ENCOUNTER — HOSPITAL ENCOUNTER (INPATIENT)
Facility: HOSPITAL | Age: 68
LOS: 5 days | Discharge: HOME HEALTH | DRG: 065 | End: 2023-05-23
Attending: INTERNAL MEDICINE | Admitting: INTERNAL MEDICINE
Payer: MEDICARE

## 2023-05-18 DIAGNOSIS — Z95.811 LVAD (LEFT VENTRICULAR ASSIST DEVICE) PRESENT: ICD-10-CM

## 2023-05-18 DIAGNOSIS — M62.81 MUSCLE WEAKNESS OF RIGHT UPPER EXTREMITY: ICD-10-CM

## 2023-05-18 DIAGNOSIS — I62.00 SUBDURAL HEMATOMA, NONTRAUMATIC: Primary | ICD-10-CM

## 2023-05-18 DIAGNOSIS — S06.5XAA SUBDURAL HEMATOMA: ICD-10-CM

## 2023-05-18 PROBLEM — D64.9 NORMOCYTIC ANEMIA: Status: ACTIVE | Noted: 2023-05-18

## 2023-05-18 PROBLEM — I95.1 ORTHOSTATIC HYPOTENSION: Status: ACTIVE | Noted: 2023-05-18

## 2023-05-18 LAB
ALBUMIN SERPL BCP-MCNC: 2.4 G/DL (ref 3.5–5.2)
ALP SERPL-CCNC: 105 U/L (ref 55–135)
ALT SERPL W/O P-5'-P-CCNC: 12 U/L (ref 10–44)
ANION GAP SERPL CALC-SCNC: 11 MMOL/L (ref 8–16)
AST SERPL-CCNC: 18 U/L (ref 10–40)
BACTERIA #/AREA URNS AUTO: ABNORMAL /HPF
BASOPHILS # BLD AUTO: 0.04 K/UL (ref 0–0.2)
BASOPHILS NFR BLD: 0.3 % (ref 0–1.9)
BILIRUB SERPL-MCNC: 0.8 MG/DL (ref 0.1–1)
BILIRUB UR QL STRIP: NEGATIVE
BUN SERPL-MCNC: 13 MG/DL (ref 8–23)
CALCIUM SERPL-MCNC: 9.1 MG/DL (ref 8.7–10.5)
CHLORIDE SERPL-SCNC: 104 MMOL/L (ref 95–110)
CLARITY UR REFRACT.AUTO: CLEAR
CO2 SERPL-SCNC: 23 MMOL/L (ref 23–29)
COLOR UR AUTO: YELLOW
CREAT SERPL-MCNC: 0.7 MG/DL (ref 0.5–1.4)
DIFFERENTIAL METHOD: ABNORMAL
DIGOXIN SERPL-MCNC: <0.2 NG/ML (ref 0.8–2)
EOSINOPHIL # BLD AUTO: 0.2 K/UL (ref 0–0.5)
EOSINOPHIL NFR BLD: 1.5 % (ref 0–8)
ERYTHROCYTE [DISTWIDTH] IN BLOOD BY AUTOMATED COUNT: 15.6 % (ref 11.5–14.5)
EST. GFR  (NO RACE VARIABLE): >60 ML/MIN/1.73 M^2
GLUCOSE SERPL-MCNC: 132 MG/DL (ref 70–110)
GLUCOSE UR QL STRIP: NEGATIVE
HCT VFR BLD AUTO: 29.6 % (ref 40–54)
HGB BLD-MCNC: 8.8 G/DL (ref 14–18)
HGB UR QL STRIP: NEGATIVE
HYALINE CASTS UR QL AUTO: 0 /LPF
IMM GRANULOCYTES # BLD AUTO: 0.06 K/UL (ref 0–0.04)
IMM GRANULOCYTES NFR BLD AUTO: 0.5 % (ref 0–0.5)
INR PPP: 3.2 (ref 0.8–1.2)
KETONES UR QL STRIP: NEGATIVE
LACTATE SERPL-SCNC: 1.7 MMOL/L (ref 0.5–2.2)
LDH SERPL L TO P-CCNC: 148 U/L (ref 110–260)
LEUKOCYTE ESTERASE UR QL STRIP: NEGATIVE
LYMPHOCYTES # BLD AUTO: 2.1 K/UL (ref 1–4.8)
LYMPHOCYTES NFR BLD: 18.1 % (ref 18–48)
MAGNESIUM SERPL-MCNC: 2.2 MG/DL (ref 1.6–2.6)
MCH RBC QN AUTO: 27.1 PG (ref 27–31)
MCHC RBC AUTO-ENTMCNC: 29.7 G/DL (ref 32–36)
MCV RBC AUTO: 91 FL (ref 82–98)
MICROSCOPIC COMMENT: ABNORMAL
MONOCYTES # BLD AUTO: 0.9 K/UL (ref 0.3–1)
MONOCYTES NFR BLD: 7.3 % (ref 4–15)
NEUTROPHILS # BLD AUTO: 8.5 K/UL (ref 1.8–7.7)
NEUTROPHILS NFR BLD: 72.3 % (ref 38–73)
NITRITE UR QL STRIP: NEGATIVE
NRBC BLD-RTO: 0 /100 WBC
PH UR STRIP: 6 [PH] (ref 5–8)
PHOSPHATE SERPL-MCNC: 2.6 MG/DL (ref 2.7–4.5)
PLATELET # BLD AUTO: 238 K/UL (ref 150–450)
PMV BLD AUTO: 9.9 FL (ref 9.2–12.9)
POTASSIUM SERPL-SCNC: 3.9 MMOL/L (ref 3.5–5.1)
PROT SERPL-MCNC: 9.6 G/DL (ref 6–8.4)
PROT UR QL STRIP: ABNORMAL
PROTHROMBIN TIME: 31.7 SEC (ref 9–12.5)
RBC # BLD AUTO: 3.25 M/UL (ref 4.6–6.2)
RBC #/AREA URNS AUTO: 5 /HPF (ref 0–4)
SODIUM SERPL-SCNC: 138 MMOL/L (ref 136–145)
SP GR UR STRIP: 1.02 (ref 1–1.03)
SQUAMOUS #/AREA URNS AUTO: 1 /HPF
TSH SERPL DL<=0.005 MIU/L-ACNC: 0.91 UIU/ML (ref 0.4–4)
URN SPEC COLLECT METH UR: ABNORMAL
WBC # BLD AUTO: 11.72 K/UL (ref 3.9–12.7)
WBC #/AREA URNS AUTO: 1 /HPF (ref 0–5)

## 2023-05-18 PROCEDURE — 80053 COMPREHEN METABOLIC PANEL: CPT | Performed by: STUDENT IN AN ORGANIZED HEALTH CARE EDUCATION/TRAINING PROGRAM

## 2023-05-18 PROCEDURE — 81001 URINALYSIS AUTO W/SCOPE: CPT | Performed by: STUDENT IN AN ORGANIZED HEALTH CARE EDUCATION/TRAINING PROGRAM

## 2023-05-18 PROCEDURE — 93010 EKG 12-LEAD: ICD-10-PCS | Mod: ,,, | Performed by: INTERNAL MEDICINE

## 2023-05-18 PROCEDURE — 80162 ASSAY OF DIGOXIN TOTAL: CPT | Performed by: STUDENT IN AN ORGANIZED HEALTH CARE EDUCATION/TRAINING PROGRAM

## 2023-05-18 PROCEDURE — 99223 1ST HOSP IP/OBS HIGH 75: CPT | Mod: AI,,, | Performed by: INTERNAL MEDICINE

## 2023-05-18 PROCEDURE — 85610 PROTHROMBIN TIME: CPT | Performed by: STUDENT IN AN ORGANIZED HEALTH CARE EDUCATION/TRAINING PROGRAM

## 2023-05-18 PROCEDURE — 84443 ASSAY THYROID STIM HORMONE: CPT | Performed by: STUDENT IN AN ORGANIZED HEALTH CARE EDUCATION/TRAINING PROGRAM

## 2023-05-18 PROCEDURE — 93005 ELECTROCARDIOGRAM TRACING: CPT

## 2023-05-18 PROCEDURE — 99223 PR INITIAL HOSPITAL CARE,LEVL III: ICD-10-PCS | Mod: AI,,, | Performed by: INTERNAL MEDICINE

## 2023-05-18 PROCEDURE — 85025 COMPLETE CBC W/AUTO DIFF WBC: CPT | Performed by: STUDENT IN AN ORGANIZED HEALTH CARE EDUCATION/TRAINING PROGRAM

## 2023-05-18 PROCEDURE — 93010 ELECTROCARDIOGRAM REPORT: CPT | Mod: ,,, | Performed by: INTERNAL MEDICINE

## 2023-05-18 PROCEDURE — 84100 ASSAY OF PHOSPHORUS: CPT | Performed by: STUDENT IN AN ORGANIZED HEALTH CARE EDUCATION/TRAINING PROGRAM

## 2023-05-18 PROCEDURE — 20000000 HC ICU ROOM

## 2023-05-18 PROCEDURE — 25000003 PHARM REV CODE 250: Performed by: STUDENT IN AN ORGANIZED HEALTH CARE EDUCATION/TRAINING PROGRAM

## 2023-05-18 PROCEDURE — 83735 ASSAY OF MAGNESIUM: CPT | Performed by: STUDENT IN AN ORGANIZED HEALTH CARE EDUCATION/TRAINING PROGRAM

## 2023-05-18 PROCEDURE — 83615 LACTATE (LD) (LDH) ENZYME: CPT | Performed by: STUDENT IN AN ORGANIZED HEALTH CARE EDUCATION/TRAINING PROGRAM

## 2023-05-18 PROCEDURE — 99900035 HC TECH TIME PER 15 MIN (STAT)

## 2023-05-18 PROCEDURE — 83605 ASSAY OF LACTIC ACID: CPT | Performed by: INTERNAL MEDICINE

## 2023-05-18 PROCEDURE — 94761 N-INVAS EAR/PLS OXIMETRY MLT: CPT

## 2023-05-18 PROCEDURE — 27000248 HC VAD-ADDITIONAL DAY

## 2023-05-18 RX ORDER — SODIUM CHLORIDE 0.9 % (FLUSH) 0.9 %
10 SYRINGE (ML) INJECTION
Status: DISCONTINUED | OUTPATIENT
Start: 2023-05-18 | End: 2023-05-23 | Stop reason: HOSPADM

## 2023-05-18 RX ORDER — ONDANSETRON 2 MG/ML
4 INJECTION INTRAMUSCULAR; INTRAVENOUS EVERY 8 HOURS PRN
Status: DISCONTINUED | OUTPATIENT
Start: 2023-05-18 | End: 2023-05-23 | Stop reason: HOSPADM

## 2023-05-18 RX ORDER — DOCUSATE SODIUM 100 MG/1
100 CAPSULE, LIQUID FILLED ORAL DAILY
Status: DISCONTINUED | OUTPATIENT
Start: 2023-05-19 | End: 2023-05-23 | Stop reason: HOSPADM

## 2023-05-18 RX ORDER — ATORVASTATIN CALCIUM 20 MG/1
80 TABLET, FILM COATED ORAL DAILY
Status: DISCONTINUED | OUTPATIENT
Start: 2023-05-19 | End: 2023-05-23 | Stop reason: HOSPADM

## 2023-05-18 RX ORDER — ACETAMINOPHEN 325 MG/1
650 TABLET ORAL EVERY 4 HOURS PRN
Status: DISCONTINUED | OUTPATIENT
Start: 2023-05-18 | End: 2023-05-23 | Stop reason: HOSPADM

## 2023-05-18 RX ORDER — GLUCAGON 1 MG
1 KIT INJECTION
Status: DISCONTINUED | OUTPATIENT
Start: 2023-05-18 | End: 2023-05-23 | Stop reason: HOSPADM

## 2023-05-18 RX ORDER — FUROSEMIDE 40 MG/1
40 TABLET ORAL DAILY
Status: DISCONTINUED | OUTPATIENT
Start: 2023-05-19 | End: 2023-05-23 | Stop reason: HOSPADM

## 2023-05-18 RX ORDER — LANOLIN ALCOHOL/MO/W.PET/CERES
400 CREAM (GRAM) TOPICAL 3 TIMES DAILY
Status: DISCONTINUED | OUTPATIENT
Start: 2023-05-18 | End: 2023-05-23 | Stop reason: HOSPADM

## 2023-05-18 RX ORDER — POLYETHYLENE GLYCOL 3350 17 G/17G
17 POWDER, FOR SOLUTION ORAL DAILY
Status: DISCONTINUED | OUTPATIENT
Start: 2023-05-19 | End: 2023-05-23 | Stop reason: HOSPADM

## 2023-05-18 RX ORDER — DEXTROSE 40 %
30 GEL (GRAM) ORAL
Status: DISCONTINUED | OUTPATIENT
Start: 2023-05-18 | End: 2023-05-23 | Stop reason: HOSPADM

## 2023-05-18 RX ORDER — FERROUS GLUCONATE 324(37.5)
324 TABLET ORAL
Status: DISCONTINUED | OUTPATIENT
Start: 2023-05-19 | End: 2023-05-23 | Stop reason: HOSPADM

## 2023-05-18 RX ORDER — SPIRONOLACTONE 25 MG/1
25 TABLET ORAL DAILY
Status: DISCONTINUED | OUTPATIENT
Start: 2023-05-19 | End: 2023-05-23 | Stop reason: HOSPADM

## 2023-05-18 RX ORDER — LISINOPRIL 5 MG/1
5 TABLET ORAL DAILY
Status: DISCONTINUED | OUTPATIENT
Start: 2023-05-19 | End: 2023-05-23 | Stop reason: HOSPADM

## 2023-05-18 RX ORDER — MIRTAZAPINE 15 MG/1
30 TABLET, FILM COATED ORAL NIGHTLY
Status: DISCONTINUED | OUTPATIENT
Start: 2023-05-18 | End: 2023-05-23 | Stop reason: HOSPADM

## 2023-05-18 RX ORDER — DEXTROSE 40 %
15 GEL (GRAM) ORAL
Status: DISCONTINUED | OUTPATIENT
Start: 2023-05-18 | End: 2023-05-23 | Stop reason: HOSPADM

## 2023-05-18 RX ORDER — AMLODIPINE BESYLATE 5 MG/1
5 TABLET ORAL DAILY
Status: DISCONTINUED | OUTPATIENT
Start: 2023-05-19 | End: 2023-05-23 | Stop reason: HOSPADM

## 2023-05-18 RX ORDER — WARFARIN SODIUM 5 MG/1
5 TABLET ORAL DAILY
Status: DISCONTINUED | OUTPATIENT
Start: 2023-05-19 | End: 2023-05-18

## 2023-05-18 RX ORDER — OXYCODONE HYDROCHLORIDE 5 MG/1
5 TABLET ORAL EVERY 4 HOURS PRN
Status: DISCONTINUED | OUTPATIENT
Start: 2023-05-18 | End: 2023-05-23 | Stop reason: HOSPADM

## 2023-05-18 RX ORDER — DIGOXIN 125 MCG
0.12 TABLET ORAL DAILY
Status: DISCONTINUED | OUTPATIENT
Start: 2023-05-19 | End: 2023-05-23 | Stop reason: HOSPADM

## 2023-05-18 RX ORDER — INSULIN ASPART 100 [IU]/ML
0-5 INJECTION, SOLUTION INTRAVENOUS; SUBCUTANEOUS
Status: DISCONTINUED | OUTPATIENT
Start: 2023-05-18 | End: 2023-05-23 | Stop reason: HOSPADM

## 2023-05-18 RX ADMIN — Medication 400 MG: at 10:05

## 2023-05-18 RX ADMIN — MIRTAZAPINE 30 MG: 30 TABLET, FILM COATED ORAL at 10:05

## 2023-05-18 NOTE — TELEPHONE ENCOUNTER
[facility] the following [facility]:     Controller: [id number]  14V Battery: [id number] MFG date: 2/10/2022  14V Battery:[id number] MFG date: 2/10/2022  Battery clip: [id number]  Battery clip: [id number]    Self test completed and passed on controller. Controller EBB charged.

## 2023-05-18 NOTE — PROGRESS NOTES
Subjective:     Mr. Rocco France is a 67 y.o. male  presenting to the infusion suite for iron infusion.      HISTORY OF PRESENT ILLNESS:  Mr. France has a medical history remarkable for non-ischemic cardiomyopathy status post DT HM3 implantation 1/13/2021 with sternal closure 1/14/2021, with unremarkable post-operative course. Other history includes DM II, iron deficiency anemia.    His most recent LVAD clinic visit was today.   Since their last clinic visit, he has been doing well. Doppler 72 at 5200 RPM, DLES 1. He is continuing to lose weight despite starting Remeron and does not have an appetite. Feels low energy and is not able to do more enjoyable activities. He mostly eats breakfast and only gets four hours of sleep at night and naps during the day. Creatinine is higher at 1.6 today with rise in BUN as well and notes he has not been drinking much. Speed has been increased from 5100 to 5200 more recently 9/2022 during admission for drop in Hb. GI did not perform any intervention at the time and he was started on PO iron.    Notes night sweats that have been ongoing since months without fevers or chills and progressive weight loss.   No syncope but multiple presyncopal episodes without recent falls. Denies SOB with current activity, orthopnea, PND, bendopnea, abdominal distension, early satiety, nausea, lower extremity edema, chest discomfort, presyncope, palpitations, or syncope. Denies adverse bleeding, no hematochezia/melena/hematuria/hematemesis. Notes some night sweats with low appetite that is new but not frequent with progressive weight loss.     Mr. France is here for #2 of 2 iron infusions to address iron deficiency anemia. No issues with first infusion. Feels well.    Past Medical History:   Diagnosis Date    Acute respiratory failure requiring reintubation 1/28/2022    CLARISSE (acute kidney injury) 1/11/2021    Diabetes mellitus     Kidney stones        Current Outpatient Medications   Medication Sig  "Dispense Refill    amLODIPine (NORVASC) 5 MG tablet Take 1 tablet (5 mg total) by mouth once daily. 30 tablet 5    atorvastatin (LIPITOR) 80 MG tablet Take 1 tablet by mouth once daily 90 tablet 0    digoxin (LANOXIN) 125 mcg tablet Take 1 tablet (0.125 mg total) by mouth once daily. 90 tablet 3    docusate sodium (COLACE) 100 MG capsule Take 100 mg by mouth Daily.      ferrous gluconate 324 mg (37.5 mg iron) Tab tablet Take 1 tablet (324 mg total) by mouth daily with breakfast. 30 tablet 11    furosemide (LASIX) 40 MG tablet Take 1 tablet (40 mg total) by mouth once daily at 6am. 30 tablet 11    lisinopriL (PRINIVIL,ZESTRIL) 5 MG tablet Take 1 tablet (5 mg total) by mouth once daily. 90 tablet 3    magnesium oxide (MAG-OX) 400 mg (241.3 mg magnesium) tablet Take 1 tablet (400 mg total) by mouth 3 (three) times daily. 90 tablet 11    mirtazapine (REMERON) 30 MG tablet Take 1 tablet (30 mg total) by mouth every evening. 30 tablet 11    SITagliptin (JANUVIA) 100 MG Tab Take 1 tablet (100 mg total) by mouth once daily. (Patient not taking: Reported on 4/5/2023) 90 tablet 0    spironolactone (ALDACTONE) 25 MG tablet Take 1 tablet (25 mg total) by mouth once daily. 30 tablet 11    warfarin (COUMADIN) 5 MG tablet Take 1 tablet (5 mg total) by mouth Daily. 35 tablet 5     No current facility-administered medications for this visit.     Review of patient's allergies indicates:   Allergen Reactions    Pcn [penicillins] Hives     There are no hospital problems to display for this patient.    Temp: 98.1 °F (36.7 °C) (05/03/23 0800)  Pulse: 87 (05/03/23 0920)  Resp: 18 (05/03/23 0800)  BP: 109/77 (05/03/23 0920)  SpO2: 98 % (05/03/23 0920)  Weight: 70 kg (154 lb 4.8 oz) (05/03/23 0800)  Height: 6' 2" (188 cm) (05/03/23 0800)    Review of Systems   Constitutional:  Positive for malaise/fatigue and weight loss.   HENT: Negative.     Eyes: Negative.    Cardiovascular: Negative.    Gastrointestinal: Negative.    Musculoskeletal: " Negative.    Skin: Negative.    Neurological: Negative.      Physical Exam  Constitutional:       General: He is not in acute distress.     Appearance: Normal appearance. He is not ill-appearing.   HENT:      Head: Normocephalic.   Cardiovascular:      Comments: VAD Hum present  Pulmonary:      Effort: Pulmonary effort is normal.      Breath sounds: Normal breath sounds.   Abdominal:      Palpations: Abdomen is soft.   Musculoskeletal:         General: Normal range of motion.      Cervical back: Normal range of motion.   Skin:     General: Skin is warm and dry.   Neurological:      Mental Status: He is oriented to person, place, and time.   Psychiatric:         Mood and Affect: Mood normal.         Behavior: Behavior normal.         Chemistry        Component Value Date/Time     (L) 04/05/2023 0720    K 4.0 04/05/2023 0720     04/05/2023 0720    CO2 26 04/05/2023 0720    BUN 24 (H) 04/05/2023 0720    CREATININE 1.6 (H) 04/05/2023 0720    GLU 91 04/05/2023 0720        Component Value Date/Time    CALCIUM 9.4 04/05/2023 0720    ALKPHOS 113 04/05/2023 0720    AST 13 04/05/2023 0720    ALT 6 (L) 04/05/2023 0720    BILITOT 0.7 04/05/2023 0720    ESTGFRAFRICA 55.3 (A) 07/13/2022 0834    EGFRNONAA 47.8 (A) 07/13/2022 0834            Magnesium   Date Value Ref Range Status   04/05/2023 2.1 1.6 - 2.6 mg/dL Final       Lab Results   Component Value Date    WBC 7.92 04/05/2023    HGB 9.0 (L) 04/05/2023    HCT 29.8 (L) 04/05/2023    MCV 88 04/05/2023     04/05/2023       Lab Results   Component Value Date    INR 2.3 05/15/2023    INR 2.2 05/08/2023    INR 3.1 05/01/2023       BNP   Date Value Ref Range Status   04/05/2023 108 (H) 0 - 99 pg/mL Final     Comment:     Values of less than 100 pg/ml are consistent with non-CHF populations.   01/26/2023 122 (H) 0 - 99 pg/mL Final     Comment:     Values of less than 100 pg/ml are consistent with non-CHF populations.   09/16/2022 382 (H) 0 - 99 pg/mL Final      Comment:     Values of less than 100 pg/ml are consistent with non-CHF populations.       LD   Date Value Ref Range Status   04/05/2023 165 110 - 260 U/L Final     Comment:     Results are increased in hemolyzed samples.   01/26/2023 140 110 - 260 U/L Final     Comment:     Results are increased in hemolyzed samples.   09/16/2022 153 110 - 260 U/L Final     Comment:     Results are increased in hemolyzed samples.     Assessment & Plan    1. Iron deficiency anemia due to chronic blood loss    2. LVAD (left ventricular assist device) present    3. Chronic combined systolic and diastolic heart failure      Plan: Completed 2 iron infusions. Recommend lab check in 1 month. Followed by LVAD department.    Karma Clark  5/18/2023

## 2023-05-18 NOTE — TELEPHONE ENCOUNTER
Transfer center called VAD Coordinator and reported that patient does not have his emergency bag and they are trying him to [facility]. They stated that the wife refused to bring emergency bag and they advised the danger of transferring the patient with out his emergency bag. Attempted to contact patient's wife. No answer, voicemail left. Will follow up tomorrow.

## 2023-05-19 ENCOUNTER — TELEPHONE (OUTPATIENT)
Dept: TRANSPLANT | Facility: CLINIC | Age: 68
End: 2023-05-19
Payer: MEDICARE

## 2023-05-19 LAB
ALBUMIN SERPL BCP-MCNC: 2 G/DL (ref 3.5–5.2)
ALP SERPL-CCNC: 95 U/L (ref 55–135)
ALT SERPL W/O P-5'-P-CCNC: 10 U/L (ref 10–44)
ANION GAP SERPL CALC-SCNC: 6 MMOL/L (ref 8–16)
ANION GAP SERPL CALC-SCNC: 6 MMOL/L (ref 8–16)
AST SERPL-CCNC: 16 U/L (ref 10–40)
BASOPHILS # BLD AUTO: 0.03 K/UL (ref 0–0.2)
BASOPHILS NFR BLD: 0.3 % (ref 0–1.9)
BILIRUB SERPL-MCNC: 0.6 MG/DL (ref 0.1–1)
BUN SERPL-MCNC: 12 MG/DL (ref 8–23)
BUN SERPL-MCNC: 12 MG/DL (ref 8–23)
CALCIUM SERPL-MCNC: 8.3 MG/DL (ref 8.7–10.5)
CALCIUM SERPL-MCNC: 8.3 MG/DL (ref 8.7–10.5)
CHLORIDE SERPL-SCNC: 106 MMOL/L (ref 95–110)
CHLORIDE SERPL-SCNC: 106 MMOL/L (ref 95–110)
CO2 SERPL-SCNC: 23 MMOL/L (ref 23–29)
CO2 SERPL-SCNC: 23 MMOL/L (ref 23–29)
CREAT SERPL-MCNC: 0.6 MG/DL (ref 0.5–1.4)
CREAT SERPL-MCNC: 0.6 MG/DL (ref 0.5–1.4)
DIFFERENTIAL METHOD: ABNORMAL
EOSINOPHIL # BLD AUTO: 0.2 K/UL (ref 0–0.5)
EOSINOPHIL NFR BLD: 2.3 % (ref 0–8)
ERYTHROCYTE [DISTWIDTH] IN BLOOD BY AUTOMATED COUNT: 15.6 % (ref 11.5–14.5)
EST. GFR  (NO RACE VARIABLE): >60 ML/MIN/1.73 M^2
EST. GFR  (NO RACE VARIABLE): >60 ML/MIN/1.73 M^2
ESTIMATED AVG GLUCOSE: 143 MG/DL (ref 68–131)
GLUCOSE SERPL-MCNC: 106 MG/DL (ref 70–110)
GLUCOSE SERPL-MCNC: 106 MG/DL (ref 70–110)
HBA1C MFR BLD: 6.6 % (ref 4–5.6)
HCT VFR BLD AUTO: 27.9 % (ref 40–54)
HGB BLD-MCNC: 8.5 G/DL (ref 14–18)
IMM GRANULOCYTES # BLD AUTO: 0.03 K/UL (ref 0–0.04)
IMM GRANULOCYTES NFR BLD AUTO: 0.3 % (ref 0–0.5)
INR PPP: 2.9 (ref 0.8–1.2)
LYMPHOCYTES # BLD AUTO: 1.6 K/UL (ref 1–4.8)
LYMPHOCYTES NFR BLD: 16.6 % (ref 18–48)
MAGNESIUM SERPL-MCNC: 2 MG/DL (ref 1.6–2.6)
MCH RBC QN AUTO: 27.5 PG (ref 27–31)
MCHC RBC AUTO-ENTMCNC: 30.5 G/DL (ref 32–36)
MCV RBC AUTO: 90 FL (ref 82–98)
MONOCYTES # BLD AUTO: 0.9 K/UL (ref 0.3–1)
MONOCYTES NFR BLD: 9.4 % (ref 4–15)
NEUTROPHILS # BLD AUTO: 6.9 K/UL (ref 1.8–7.7)
NEUTROPHILS NFR BLD: 71.1 % (ref 38–73)
NRBC BLD-RTO: 0 /100 WBC
PHOSPHATE SERPL-MCNC: 2.5 MG/DL (ref 2.7–4.5)
PLATELET # BLD AUTO: 219 K/UL (ref 150–450)
PMV BLD AUTO: 9.8 FL (ref 9.2–12.9)
POCT GLUCOSE: 124 MG/DL (ref 70–110)
POCT GLUCOSE: 134 MG/DL (ref 70–110)
POTASSIUM SERPL-SCNC: 3.8 MMOL/L (ref 3.5–5.1)
POTASSIUM SERPL-SCNC: 3.8 MMOL/L (ref 3.5–5.1)
PROT SERPL-MCNC: 8.3 G/DL (ref 6–8.4)
PROTHROMBIN TIME: 29.4 SEC (ref 9–12.5)
RBC # BLD AUTO: 3.09 M/UL (ref 4.6–6.2)
SODIUM SERPL-SCNC: 135 MMOL/L (ref 136–145)
SODIUM SERPL-SCNC: 135 MMOL/L (ref 136–145)
WBC # BLD AUTO: 9.76 K/UL (ref 3.9–12.7)

## 2023-05-19 PROCEDURE — 94761 N-INVAS EAR/PLS OXIMETRY MLT: CPT

## 2023-05-19 PROCEDURE — 99233 PR SUBSEQUENT HOSPITAL CARE,LEVL III: ICD-10-PCS | Mod: ,,, | Performed by: PHYSICIAN ASSISTANT

## 2023-05-19 PROCEDURE — 99222 1ST HOSP IP/OBS MODERATE 55: CPT | Mod: GC,,, | Performed by: NEUROLOGICAL SURGERY

## 2023-05-19 PROCEDURE — 80053 COMPREHEN METABOLIC PANEL: CPT | Performed by: STUDENT IN AN ORGANIZED HEALTH CARE EDUCATION/TRAINING PROGRAM

## 2023-05-19 PROCEDURE — 25000003 PHARM REV CODE 250: Performed by: STUDENT IN AN ORGANIZED HEALTH CARE EDUCATION/TRAINING PROGRAM

## 2023-05-19 PROCEDURE — 99222 PR INITIAL HOSPITAL CARE,LEVL II: ICD-10-PCS | Mod: GC,,, | Performed by: NEUROLOGICAL SURGERY

## 2023-05-19 PROCEDURE — 25000003 PHARM REV CODE 250: Performed by: PHYSICIAN ASSISTANT

## 2023-05-19 PROCEDURE — 83735 ASSAY OF MAGNESIUM: CPT | Performed by: STUDENT IN AN ORGANIZED HEALTH CARE EDUCATION/TRAINING PROGRAM

## 2023-05-19 PROCEDURE — 99233 SBSQ HOSP IP/OBS HIGH 50: CPT | Mod: ,,, | Performed by: PHYSICIAN ASSISTANT

## 2023-05-19 PROCEDURE — 99900035 HC TECH TIME PER 15 MIN (STAT)

## 2023-05-19 PROCEDURE — 93750 PR INTERROGATE VENT ASSIST DEV, IN PERSON, W PHYSICIAN ANALYSIS: ICD-10-PCS | Mod: ,,, | Performed by: INTERNAL MEDICINE

## 2023-05-19 PROCEDURE — 85025 COMPLETE CBC W/AUTO DIFF WBC: CPT | Performed by: STUDENT IN AN ORGANIZED HEALTH CARE EDUCATION/TRAINING PROGRAM

## 2023-05-19 PROCEDURE — 20600001 HC STEP DOWN PRIVATE ROOM

## 2023-05-19 PROCEDURE — 84100 ASSAY OF PHOSPHORUS: CPT | Performed by: STUDENT IN AN ORGANIZED HEALTH CARE EDUCATION/TRAINING PROGRAM

## 2023-05-19 PROCEDURE — 93750 INTERROGATION VAD IN PERSON: CPT | Mod: ,,, | Performed by: INTERNAL MEDICINE

## 2023-05-19 PROCEDURE — 85610 PROTHROMBIN TIME: CPT | Performed by: STUDENT IN AN ORGANIZED HEALTH CARE EDUCATION/TRAINING PROGRAM

## 2023-05-19 PROCEDURE — 83036 HEMOGLOBIN GLYCOSYLATED A1C: CPT | Performed by: INTERNAL MEDICINE

## 2023-05-19 PROCEDURE — 27000248 HC VAD-ADDITIONAL DAY

## 2023-05-19 RX ORDER — POTASSIUM CHLORIDE 20 MEQ/1
20 TABLET, EXTENDED RELEASE ORAL ONCE
Status: COMPLETED | OUTPATIENT
Start: 2023-05-19 | End: 2023-05-19

## 2023-05-19 RX ADMIN — ATORVASTATIN CALCIUM 80 MG: 40 TABLET, FILM COATED ORAL at 09:05

## 2023-05-19 RX ADMIN — Medication 400 MG: at 09:05

## 2023-05-19 RX ADMIN — POLYETHYLENE GLYCOL 3350 17 G: 17 POWDER, FOR SOLUTION ORAL at 09:05

## 2023-05-19 RX ADMIN — DIGOXIN 0.12 MG: 125 TABLET ORAL at 09:05

## 2023-05-19 RX ADMIN — POTASSIUM CHLORIDE 20 MEQ: 1500 TABLET, EXTENDED RELEASE ORAL at 09:05

## 2023-05-19 RX ADMIN — FUROSEMIDE 40 MG: 40 TABLET ORAL at 09:05

## 2023-05-19 RX ADMIN — AMLODIPINE BESYLATE 5 MG: 5 TABLET ORAL at 09:05

## 2023-05-19 RX ADMIN — DOCUSATE SODIUM 100 MG: 100 CAPSULE, LIQUID FILLED ORAL at 04:05

## 2023-05-19 RX ADMIN — MIRTAZAPINE 30 MG: 30 TABLET, FILM COATED ORAL at 09:05

## 2023-05-19 RX ADMIN — OXYCODONE HYDROCHLORIDE 5 MG: 5 TABLET ORAL at 06:05

## 2023-05-19 RX ADMIN — Medication 400 MG: at 04:05

## 2023-05-19 RX ADMIN — OXYCODONE HYDROCHLORIDE 5 MG: 5 TABLET ORAL at 09:05

## 2023-05-19 RX ADMIN — SITAGLIPTIN 100 MG: 50 TABLET, FILM COATED ORAL at 09:05

## 2023-05-19 RX ADMIN — Medication 324 MG: at 09:05

## 2023-05-19 RX ADMIN — SPIRONOLACTONE 25 MG: 25 TABLET, FILM COATED ORAL at 09:05

## 2023-05-19 RX ADMIN — LISINOPRIL 5 MG: 5 TABLET ORAL at 09:05

## 2023-05-19 RX ADMIN — OXYCODONE HYDROCHLORIDE 5 MG: 5 TABLET ORAL at 11:05

## 2023-05-19 NOTE — ASSESSMENT & PLAN NOTE
67M with h/o heart failure s/p LVAD placement on Warfarin with known R frontal SDH, stable on interval imaging. Prior to arrival patient states he had recent fall about a week ago with 2 episodes of his legs giving out. INR 3.4.    --No acute neurosurgical intervention. CTH stable compared to March imaging, no new hemorrhage  --Risk/benefit of AC needs to bed discussed with patient given recent falls and high risk for intracranial hemorrhage given therapeutic INR  --Consider MRI Brain/MRI C spine given new RUE weakness/gait instability not noted on prior admission  --Medical management/work up per primary  --Page w/exam change    D/w Dr. Izquierdo

## 2023-05-19 NOTE — ASSESSMENT & PLAN NOTE
-Hx of iron deficiency and acute blood loss. On chronic anticoagulation.   -Continue supplemental iron

## 2023-05-19 NOTE — PLAN OF CARE
Cardiac ICU Care Plan    POC reviewed with Rocco France.  Pt updating family with POC over the phone. Questions and concerns addressed. No acute events today. Pt remains neurologically intact.  Weakness to R arm and tremor to R hand remain unchanged.  Plan for pt to step down out of ICU.  Pt progressing toward goals. See below and flowsheets for full assessment and VS info.       Neuro:  Nain Coma Scale  Best Eye Response: 4-->(E4) spontaneous  Best Motor Response: 6-->(M6) obeys commands  Best Verbal Response: 5-->(V5) oriented  Nain Coma Scale Score: 15  Assessment Qualifiers: patient not sedated/intubated, no eye obstruction present  Pupil PERRLA: yes    24 hr Temp:  [97.7 °F (36.5 °C)-98.1 °F (36.7 °C)]      CV:  Rhythm: normal sinus rhythm   DVT prophylaxis: VTE Required Core Measure: Pharmacological prophylaxis initiated/maintained     Type: HeartMate3       Pulses  Right Radial Pulse: 2+ (normal)  Left Radial Pulse: 2+ (normal)  Right Dorsalis Pedis Pulse: 1+ (weak)  Left Dorsalis Pedis Pulse: 1+ (weak)  Right Posterior Tibial Pulse: 1+ (weak)  Left Posterior Tibial Pulse: 1+ (weak)    Resp:          GI/:  GI prophylaxis: None ordered, team notified  Diet/Nutrition Received: 2 gram sodium, low saturated fat/low cholesterol  Last Bowel Movement: 05/17/23, bowel management program followed      Intake/Output Summary (Last 24 hours) at 5/19/2023 1256  Last data filed at 5/19/2023 1255  Gross per 24 hour   Intake 480 ml   Output 800 ml   Net -320 ml        Nutritional Supplement Intake: None ordered    Labs/Accuchecks:  Recent Labs   Lab 05/18/23 1939 05/19/23  0342   WBC 11.72 9.76   RBC 3.25* 3.09*   HGB 8.8* 8.5*   HCT 29.6* 27.9*    219      Recent Labs   Lab 05/15/23  0000 05/18/23 1939 05/19/23  0342   INR 2.3 3.2* 2.9*      Recent Labs     05/19/23  0342   *  135*   K 3.8  3.8   CO2 23  23     106   BUN 12  12   CREATININE 0.6  0.6   ALKPHOS 95   ALT 10   AST 16    BILITOT 0.6     No results for input(s): CPK, CPKMB, MB, TROPONINI in the last 168 hours. No results for input(s): PH, PCO2, PO2, HCO3, POCSATURATED, BE in the last 72 hours.    Electrolytes: Electrolytes replaced  Accuchecks: ACHS, no supplemental insulin required this shift    Gtts/LDAs:      Lines/Drains/Airways       Line  Duration                  VAD 01/13/21 1211 Left ventricular assist device HeartMate 3 855 days              Peripheral Intravenous Line  Duration                  Peripheral IV - Single Lumen 05/18/23 20 G Anterior;Right Forearm 1 day         Peripheral IV - Single Lumen 05/18/23 2000 22 G Left Forearm <1 day                    Skin/Wounds  Skin intact

## 2023-05-19 NOTE — CONSULTS
[facility] - Cardiac Intensive Care  Neurosurgery  Consult Note    Inpatient consult to Neurosurgery  Consult performed by: Patricia Cortez MD  Consult ordered by: Aye Lind MD  Reason for consult: fall        Subjective:     Chief Complaint/Reason for Admission: Fall    History of Present Illness: 67M with non-ischemic cardiomyopathy status post DT HM3 implantation 1/13/2021 transferred for evaluation of known R frontal chronic SDH.  Last INR on records 3.4. Mr. France says on Saturday 05/13 around 5am he stood up used the bathroom and went to his chair. He went to grab something on the floor when all of sudden his legs gave out, feels weak in R forearm/hand. No neck pain.  States he went to sleep until 9am when he stood up to the bathroom and as he was walking back to the bed he look down and same episode happen. His legs gave out and he fell to the floor. He then called his neighbor to help him stand up.  He denied any presyncopal symptoms, confusion, diarrhea or any changes that could have precipitated his symptoms. He has home health who monitor his BP but he does not remember his usual numbers. His wife also noticed a new R. Hand tremor.      Patient had been admitted in the past on 1/27/22 after a syncopal event and was found to have an evolving R acute subdural hematoma plus acute basal cistern subarachnoid hemorrhage. At that time patient was intubated and had a prolonged hospitalization complicated by COVID.    [place] [facility] [place] prior imaging from March 2023, no new acute hemorrhage.      Warfarin daily, INR 3.4      Medications Prior to Admission   Medication Sig Dispense Refill Last Dose    amLODIPine (NORVASC) 5 MG tablet Take 1 tablet (5 mg total) by mouth once daily. 30 tablet 5     atorvastatin (LIPITOR) 80 MG tablet Take 1 tablet by mouth once daily 90 tablet 0     digoxin (LANOXIN) 125 mcg tablet Take 1 tablet (0.125 mg total) by mouth once daily. 90 tablet 3      docusate sodium (COLACE) 100 MG capsule Take 100 mg by mouth Daily.       ferrous gluconate 324 mg (37.5 mg iron) Tab tablet Take 1 tablet (324 mg total) by mouth daily with breakfast. 30 tablet 11     furosemide (LASIX) 40 MG tablet Take 1 tablet (40 mg total) by mouth once daily at 6am. 30 tablet 11     lisinopriL (PRINIVIL,ZESTRIL) 5 MG tablet Take 1 tablet (5 mg total) by mouth once daily. 90 tablet 3     magnesium oxide (MAG-OX) 400 mg (241.3 mg magnesium) tablet Take 1 tablet (400 mg total) by mouth 3 (three) times daily. 90 tablet 11     mirtazapine (REMERON) 30 MG tablet Take 1 tablet (30 mg total) by mouth every evening. 30 tablet 11     SITagliptin (JANUVIA) 100 MG Tab Take 1 tablet (100 mg total) by mouth once daily. (Patient not taking: Reported on 4/5/2023) 90 tablet 0     spironolactone (ALDACTONE) 25 MG tablet Take 1 tablet (25 mg total) by mouth once daily. 30 tablet 11     warfarin (COUMADIN) 5 MG tablet Take 1 tablet (5 mg total) by mouth Daily. 35 tablet 5        Review of patient's allergies indicates:   Allergen Reactions    Pcn [penicillins] Hives       Past Medical History:   Diagnosis Date    Acute respiratory failure requiring reintubation 1/28/2022    CLARISSE (acute kidney injury) 1/11/2021    Diabetes mellitus     Kidney stones      Past Surgical History:   Procedure Laterality Date    CARDIAC CATHETERIZATION  2010    no stents    CARDIAC DEFIBRILLATOR PLACEMENT  2010    CARDIAC DEFIBRILLATOR PLACEMENT      HERNIA REPAIR      IRRIGATION OF MEDIASTINUM N/A 1/14/2021    Procedure: IRRIGATION, MEDIASTINUM;  Surgeon: Zenon Corona MD;  Location: [facility] OR [address] [place] [address];  Service: Cardiovascular;  Laterality: N/A;    KNEE ARTHROSCOPY Right     LEFT VENTRICULAR ASSIST DEVICE Left 1/13/2021    Procedure: INSERTION- HeartMate 3 LEFT VENTRICULAR ASSIST DEVICE ;  Surgeon: Zenon Corona MD;  Location: [facility] OR [address] [place] [address];  Service: Cardiovascular;  Laterality: Left;    RECONSTRUCTION OF  PERICARDIUM N/A 1/14/2021    Procedure: RECONSTRUCTION, PERICARDIUM;  Surgeon: Zenon Corona MD;  [facility];  Service: Cardiovascular;  Laterality: N/A;    RIGHT HEART CATHETERIZATION Right 11/2/2020    Procedure: INSERTION, CATHETER, RIGHT HEART;  Surgeon: Deana Lake MD;  Location: [facility] CATH LAB;  Service: Cardiology;  Laterality: Right;    RIGHT HEART CATHETERIZATION Right 3/24/2022    Procedure: INSERTION, CATHETER, RIGHT HEART;  Surgeon: Manuel Ramos Jr., MD;  Location: [facility] CATH LAB;  Service: Cardiology;  Laterality: Right;    STERNAL WOUND CLOSURE  1/13/2021    Procedure: TEMPORARY CLOSURE OF CHEST;  Surgeon: Zenon Corona MD;  [facility];  Service: Cardiovascular;;    STERNAL WOUND CLOSURE N/A 1/14/2021    Procedure: CLOSURE, WOUND, STERNUM;  Surgeon: Zenon Corona MD;  [facility];  Service: Cardiovascular;  Laterality: N/A;     Family History       Problem Relation (Age of Onset)    Heart attack Mother, Brother    Heart failure Brother    Hypertension Brother          Tobacco Use    Smoking status: Some Days     Types: Cigarettes    Smokeless tobacco: Never   Substance and Sexual Activity    Alcohol use: No    Drug use: Not on file    Sexual activity: Not on file     Review of Systems   Constitutional:  Negative for chills and fever.   HENT:  Negative for trouble swallowing and voice change.    Eyes:  Negative for photophobia and visual disturbance.   Respiratory:  Negative for chest tightness and shortness of breath.    Gastrointestinal:  Negative for nausea and vomiting.   Musculoskeletal:  Positive for arthralgias and myalgias. Negative for back pain and neck pain.   Neurological:  Positive for weakness. Negative for facial asymmetry.   Objective:        There is no height or weight on file to calculate BMI.  Vital Signs (Most Recent):  Temp: 98.1 °F (36.7 °C) (05/18/23 2305)  Pulse: 86 (05/19/23 0057)  Resp: 17 (05/19/23 0057)  BP:  109/76 (05/19/23 0000)  SpO2: 100 % (05/19/23 0057) Vital Signs (24h Range):  Temp:  [97.7 °F (36.5 °C)-98.1 °F (36.7 °C)] 98.1 °F (36.7 °C)  Pulse:  [75-95] 86  Resp:  [12-37] 17  SpO2:  [100 %] 100 %  BP: (105-152)/(0-93) 109/76                                 Physical Exam         Neurosurgery Physical Exam  General: no distress  Head: Non-traumatic, normocephalic  Eyes: Pupils equal, EOMi  Neck: Supple, normal ROM, no tenderness to palpation  CVS: Normal rate and rhythm, distal pulses present  Pulm: Symmetric expansion, no respiratory distress  GI: Abdomen nondistended, nontender  MSK: Moves all extremities without restriction, atraumatic  Skin: Dry, intact  Psych: Normal thought content and cognition  Neuro: AOx3, GCS [id number]  CNII-XII: Intact on fine exam, PERRL, EOMi, facial sensation preserved, no facial asymmetry, tongue/uvula/palate midline, shoulder shrug equal, No pronator drift  Extremities:  Motor:  Upper Extremity:                                  Deltoids        Triceps        Biceps        WE        WF                Interosseous           R        3-/5              3/5              3/5            4/5         4/5         3/5                3/5           L        5/5              5/5              5/5            5/5         5/5         5/5                5/5  Lower Extremity:                                      HF        KE        KF        DF        PF        EHL           R       5/5        5/5        5/5        5/5        5/5        5/5           L       5/5        5/5        5/5        5/5        5/5        5/5    Reflexes:     DTR: 2+ and symmetrically throughout     Bolaños's: Negative      Sensory:      Sensorium intact throughout, no sensory level present    Coordination:      Coordination intact throughout    Cervical Spine:      ROM: Full with flexion, extension, lateral rotation and ear-to-shoulder bend.      Midline TTP: Negative         Significant Labs:  Recent Labs   Lab  05/18/23 1939   *      K 3.9      CO2 23   BUN 13   CREATININE 0.7   CALCIUM 9.1   MG 2.2     Recent Labs   Lab 05/18/23 1939   WBC 11.72   HGB 8.8*   HCT 29.6*        Recent Labs   Lab 05/18/23 1939   INR 3.2*     Microbiology Results (last 7 days)       ** No results found for the last 168 hours. **          All pertinent labs from the last 24 hours have been reviewed.    Significant Diagnostics:  I have reviewed and interpreted all pertinent imaging results/findings within the past 24 hours.    CT Head Without Contrast    Result Date: 5/18/2023  Similar appearing right hypodense extra-axial fluid collection.  No significant mass effect midline shift.  No new hemorrhage. Electronically signed by resident: Albaro Guadarrama Date:    05/18/2023 Time:    22:09 Electronically signed by: Bertin Cadet MD Date:    05/18/2023 Time:    22:29       Assessment/Plan:     * Subdural hematoma, nontraumatic  67M with h/o heart failure s/p LVAD placement on Warfarin with known R frontal SDH, stable on interval imaging. Prior to arrival patient states he had recent fall about a week ago with 2 episodes of his legs giving out. INR 3.4.    --No acute neurosurgical intervention. CTH stable compared to March imaging, no new hemorrhage  --Risk/benefit of AC needs to bed discussed with patient given recent falls and high risk for intracranial hemorrhage given therapeutic INR  --Consider MRI Brain/MRI C spine given new RUE weakness/gait instability not noted on prior admission  --Medical management/work up per primary  --Page w/exam change    D/w Dr. Izquierdo        Thank you for your consult. I will follow-up with patient. Please contact us if you have any additional questions.    Patricia Marino MD  Neurosurgery  [facility] - Cardiac Int[facility]

## 2023-05-19 NOTE — SUBJECTIVE & OBJECTIVE
Past Medical History:   Diagnosis Date    Acute respiratory failure requiring reintubation 1/28/2022    CLARISSE (acute kidney injury) 1/11/2021    Diabetes mellitus     Kidney stones        Past Surgical History:   Procedure Laterality Date    CARDIAC CATHETERIZATION  2010    no stents    CARDIAC DEFIBRILLATOR PLACEMENT  2010    CARDIAC DEFIBRILLATOR PLACEMENT      HERNIA REPAIR      IRRIGATION OF MEDIASTINUM N/A 1/14/2021    Procedure: IRRIGATION, MEDIASTINUM;  Surgeon: Zenon Corona MD;  [facility];  Service: Cardiovascular;  Laterality: N/A;    KNEE ARTHROSCOPY Right     LEFT VENTRICULAR ASSIST DEVICE Left 1/13/2021    Procedure: INSERTION- HeartMate 3 LEFT VENTRICULAR ASSIST DEVICE ;  Surgeon: Zenon Corona MD;  [facility];  Service: Cardiovascular;  Laterality: Left;    RECONSTRUCTION OF PERICARDIUM N/A 1/14/2021    Procedure: RECONSTRUCTION, PERICARDIUM;  Surgeon: Zenon Corona MD;  [facility];  Service: Cardiovascular;  Laterality: N/A;    RIGHT HEART CATHETERIZATION Right 11/2/2020    Procedure: INSERTION, CATHETER, RIGHT HEART;  Surgeon: Deana Lake MD;  Location: [facility] CATH LAB;  Service: Cardiology;  Laterality: Right;    RIGHT HEART CATHETERIZATION Right 3/24/2022    Procedure: INSERTION, CATHETER, RIGHT HEART;  Surgeon: Manuel Ramos Jr., MD;  Location: [facility] CATH LAB;  Service: Cardiology;  Laterality: Right;    STERNAL WOUND CLOSURE  1/13/2021    Procedure: TEMPORARY CLOSURE OF CHEST;  Surgeon: Zenon Corona MD;  [facility];  Service: Cardiovascular;;    STERNAL WOUND CLOSURE N/A 1/14/2021    Procedure: CLOSURE, WOUND, STERNUM;  Surgeon: Zenon Corona MD;  [facility];  Service: Cardiovascular;  Laterality: N/A;       Review of patient's allergies indicates:   Allergen Reactions    Pcn [penicillins] Hives       Current Facility-Administered Medications   Medication    acetaminophen tablet 650 mg    [START ON 5/19/2023]  amLODIPine tablet 5 mg    [START ON 5/19/2023] atorvastatin tablet 80 mg    [START ON 5/19/2023] digoxin tablet 0.125 mg    [START ON 5/19/2023] docusate sodium capsule 100 mg    [START ON 5/19/2023] ferrous gluconate tablet 324 mg    [START ON 5/19/2023] furosemide tablet 40 mg    [START ON 5/19/2023] lisinopriL tablet 5 mg    magnesium oxide tablet 400 mg    mirtazapine tablet 30 mg    ondansetron injection 4 mg    oxyCODONE immediate release tablet 5 mg    [START ON 5/19/2023] polyethylene glycol packet 17 g    [START ON 5/19/2023] SITagliptin phosphate tablet 100 mg    sodium chloride 0.9% flush 10 mL    [START ON 5/19/2023] spironolactone tablet 25 mg     Family History       Problem Relation (Age of Onset)    Heart attack Mother, Brother    Heart failure Brother    Hypertension Brother          Tobacco Use    Smoking status: Some Days     Types: Cigarettes    Smokeless tobacco: Never   Substance and Sexual Activity    Alcohol use: No    Drug use: Not on file    Sexual activity: Not on file     Review of Systems   Constitutional:  Negative for appetite change, chills, diaphoresis, fatigue and fever.   HENT:  Negative for hearing loss, sore throat, tinnitus and trouble swallowing.    Eyes:  Negative for photophobia and visual disturbance.   Respiratory:  Negative for cough, chest tightness and shortness of breath.    Cardiovascular:  Negative for chest pain, palpitations and leg swelling.   Gastrointestinal:  Negative for abdominal pain, constipation, diarrhea, nausea and vomiting.   Genitourinary:  Negative for dysuria and flank pain.   Musculoskeletal:  Negative for gait problem and neck pain.   Neurological:  Positive for tremors and weakness. Negative for dizziness, seizures, syncope, speech difficulty, light-headedness, numbness and headaches.   Psychiatric/Behavioral:  Negative for confusion. The patient is not nervous/anxious.    Objective:     Vital Signs (Most Recent):  Temp: 97.7 °F (36.5 °C) (05/18/23  1830)  Pulse: 88 (05/18/23 1830)  Resp: (!) 37 (05/18/23 1830)  BP: (!) 152/84 (05/18/23 1830)  SpO2: 100 % (05/18/23 1830) Vital Signs (24h Range):  Temp:  [97.7 °F (36.5 °C)-98.1 °F (36.7 °C)] 97.7 °F (36.5 °C)  Pulse:  [75-88] 88  Resp:  [16-37] 37  SpO2:  [100 %] 100 %  BP: (105-152)/(80-93) 152/84     No data found.  There is no height or weight on file to calculate BMI.      Intake/Output Summary (Last 24 hours) at 5/18/2023 2016  Last data filed at 5/18/2023 1830  Gross per 24 hour   Intake 0 ml   Output 0 ml   Net 0 ml          Physical Exam  Vitals reviewed.   Constitutional:       General: He is not in acute distress.     Appearance: Normal appearance. He is normal weight. He is not ill-appearing, toxic-appearing or diaphoretic.   HENT:      Head: Normocephalic and atraumatic.      Nose: Nose normal. No congestion.      Mouth/Throat:      Mouth: Mucous membranes are dry.      Pharynx: Oropharynx is clear.   Eyes:      Extraocular Movements: Extraocular movements intact.      Conjunctiva/sclera: Conjunctivae normal.      Pupils: Pupils are equal, round, and reactive to light.   Cardiovascular:      Rate and Rhythm: Normal rate and regular rhythm.      Pulses: Normal pulses.      Comments: Lvad hum  Pulmonary:      Effort: Pulmonary effort is normal. No respiratory distress.      Breath sounds: Normal breath sounds. No wheezing or rhonchi.   Abdominal:      General: Abdomen is flat. Bowel sounds are normal.      Palpations: Abdomen is soft.   Musculoskeletal:         General: No swelling or tenderness. Normal range of motion.      Cervical back: Normal range of motion and neck supple. No rigidity or tenderness.   Skin:     General: Skin is warm and dry.      Capillary Refill: Capillary refill takes less than 2 seconds.   Neurological:      Mental Status: He is alert and oriented to person, place, and time.      Motor: Weakness present.      Comments: R arm weakness   Psychiatric:         Mood and Affect:  Mood normal.         Thought Content: Thought content normal.          Significant Labs:  CBC:  Recent Labs   Lab 05/18/23 1939   WBC 11.72   RBC 3.25*   HGB 8.8*   HCT 29.6*      MCV 91   MCH 27.1   MCHC 29.7*     BNP:  No results for input(s): BNP in the last 168 hours.    Invalid input(s): BNPTRIAGELBLO  CMP:  Recent Labs   Lab 05/18/23 1939   *   CALCIUM 9.1   ALBUMIN 2.4*   PROT 9.6*      K 3.9   CO2 23         Coagulation:   Recent Labs   Lab 05/15/23  0000 05/18/23 1939   INR 2.3 3.2*     LDH:  No results for input(s): LDH in the last 72 hours.  Microbiology:  Microbiology Results (last 7 days)       ** No results found for the last 168 hours. **            I have reviewed all pertinent labs within the past 24 hours.    Diagnostic Results:  I have reviewed all pertinent imaging results/findings within the past 24 hours.

## 2023-05-19 NOTE — PROGRESS NOTES
Pt AAAO, no family at bedside.  No alarms noted in history except for no power on 5/6/23.  NO questions or needs for me at this time.

## 2023-05-19 NOTE — SUBJECTIVE & OBJECTIVE
Interval History: Patient transferred from outside facility with concerns of SDH.  He has hx R frontal chronic SDH.  Neurosurgery consulted and CT of head here stable compared to prior imaging from March 2023.  No acute findings.  No neurosurgical intervention warranted.  Recommending MRI of brain given RUE weakness/gait instability; however, unable to complete given LVAD.  Will decrease INR goal to 1.5-2.0 and get PT/OT evaluation.  Will transfer to the floor today.  Patient reports feeling well and at baseline.     Continuous Infusions:  Scheduled Meds:   amLODIPine  5 mg Oral Daily    atorvastatin  80 mg Oral Daily    digoxin  0.125 mg Oral Daily    docusate sodium  100 mg Oral Daily    ferrous gluconate  324 mg Oral Daily with breakfast    furosemide  40 mg Oral Daily    lisinopriL  5 mg Oral Daily    magnesium oxide  400 mg Oral TID    mirtazapine  30 mg Oral QHS    polyethylene glycol  17 g Oral Daily    SITagliptin phosphate  100 mg Oral Daily    spironolactone  25 mg Oral Daily     PRN Meds:acetaminophen, dextrose 10%, dextrose 10%, dextrose, dextrose, glucagon (human recombinant), insulin aspart U-100, ondansetron, oxyCODONE, sodium chloride 0.9%    Review of patient's allergies indicates:   Allergen Reactions    Pcn [penicillins] Hives     Objective:     Vital Signs (Most Recent):  Temp: 97.9 °F (36.6 °C) (05/19/23 1115)  Pulse: 82 (05/19/23 1115)  Resp: 13 (05/19/23 1115)  BP: 90/68 (05/19/23 1115)  SpO2: 100 % (05/19/23 1115) Vital Signs (24h Range):  Temp:  [97.7 °F (36.5 °C)-98.1 °F (36.7 °C)] 97.9 °F (36.6 °C)  Pulse:  [75-95] 82  Resp:  [12-37] 13  SpO2:  [95 %-100 %] 100 %  BP: ()/(0-93) 90/68     Patient Vitals for the past 72 hrs (Last 3 readings):   Weight   05/19/23 0600 67.6 kg (149 lb 0.5 oz)     Body mass index is 19.13 kg/m².      Intake/Output Summary (Last 24 hours) at 5/19/2023 1133  Last data filed at 5/19/2023 1115  Gross per 24 hour   Intake 240 ml   Output 625 ml   Net -385 ml        Hemodynamic Parameters:       Telemetry: reviewed     Physical Exam   Constitutional:       General: He is not in acute distress.     Appearance: Normal appearance. He is normal weight. He is not ill-appearing, toxic-appearing or diaphoretic.   HENT:      Head: Normocephalic and atraumatic.      Nose: Nose normal. No congestion.      Mouth/Throat:      Mouth: Mucous membranes are dry.      Pharynx: Oropharynx is clear.   Eyes:      Extraocular Movements: Extraocular movements intact.      Conjunctiva/sclera: Conjunctivae normal.      Pupils: Pupils are equal, round, and reactive to light.   Cardiovascular:      Rate and Rhythm: Normal rate and regular rhythm.      Pulses: Normal pulses.      Comments: Lvad hum  Pulmonary:      Effort: Pulmonary effort is normal. No respiratory distress.      Breath sounds: Normal breath sounds. No wheezing or rhonchi.   Abdominal:      General: Abdomen is flat. Bowel sounds are normal.      Palpations: Abdomen is soft.   Musculoskeletal:         General: No swelling or tenderness. Normal range of motion.      Cervical back: Normal range of motion and neck supple. No rigidity or tenderness.   Skin:     General: Skin is warm and dry.      Capillary Refill: Capillary refill takes less than 2 seconds.   Neurological:      Mental Status: He is alert and oriented to person, place, and time.      Motor: Weakness present.      Comments: R arm weakness   Psychiatric:         Mood and Affect: Mood normal.         Thought Content: Thought content normal.     Significant Labs:  CBC:  Recent Labs   Lab 05/18/23 1939 05/19/23  0342   WBC 11.72 9.76   RBC 3.25* 3.09*   HGB 8.8* 8.5*   HCT 29.6* 27.9*    219   MCV 91 90   MCH 27.1 27.5   MCHC 29.7* 30.5*     BNP:  No results for input(s): BNP in the last 168 hours.    Invalid input(s): BNPTRIAGELBLO  CMP:  Recent Labs   Lab 05/18/23 1939 05/19/23  0342   * 106  106   CALCIUM 9.1 8.3*  8.3*   ALBUMIN 2.4* 2.0*   PROT 9.6* 8.3     135*  135*   K 3.9 3.8  3.8   CO2 23 23 23    106  106   BUN 13 12  12   CREATININE 0.7 0.6  0.6   ALKPHOS 105 95   ALT 12 10   AST 18 16   BILITOT 0.8 0.6      Coagulation:   Recent Labs   Lab 05/15/23  0000 05/18/23  1939 05/19/23  0342   INR 2.3 3.2* 2.9*     LDH:  Recent Labs   Lab 05/18/23 2027        Microbiology:  Microbiology Results (last 7 days)       ** No results found for the last 168 hours. **            I have reviewed all pertinent labs within the past 24 hours.    Estimated Creatinine Clearance: 114.2 mL/min (based on SCr of 0.6 mg/dL).    Diagnostic Results:  I have reviewed all pertinent imaging results/findings within the past 24 hours.

## 2023-05-19 NOTE — ASSESSMENT & PLAN NOTE
Hx of orthostatic hypotension with falls and lightheadedness, for which his BP meds were reduced dating back to 7/13/2022. Recent event had been preceeded by standing up and walking to the bathroom then sudden fall when looking down.     -F/u CT head  - Monitoring BP

## 2023-05-19 NOTE — SUBJECTIVE & OBJECTIVE
Medications Prior to Admission   Medication Sig Dispense Refill Last Dose    amLODIPine (NORVASC) 5 MG tablet Take 1 tablet (5 mg total) by mouth once daily. 30 tablet 5     atorvastatin (LIPITOR) 80 MG tablet Take 1 tablet by mouth once daily 90 tablet 0     digoxin (LANOXIN) 125 mcg tablet Take 1 tablet (0.125 mg total) by mouth once daily. 90 tablet 3     docusate sodium (COLACE) 100 MG capsule Take 100 mg by mouth Daily.       ferrous gluconate 324 mg (37.5 mg iron) Tab tablet Take 1 tablet (324 mg total) by mouth daily with breakfast. 30 tablet 11     furosemide (LASIX) 40 MG tablet Take 1 tablet (40 mg total) by mouth once daily at 6am. 30 tablet 11     lisinopriL (PRINIVIL,ZESTRIL) 5 MG tablet Take 1 tablet (5 mg total) by mouth once daily. 90 tablet 3     magnesium oxide (MAG-OX) 400 mg (241.3 mg magnesium) tablet Take 1 tablet (400 mg total) by mouth 3 (three) times daily. 90 tablet 11     mirtazapine (REMERON) 30 MG tablet Take 1 tablet (30 mg total) by mouth every evening. 30 tablet 11     SITagliptin (JANUVIA) 100 MG Tab Take 1 tablet (100 mg total) by mouth once daily. (Patient not taking: Reported on 4/5/2023) 90 tablet 0     spironolactone (ALDACTONE) 25 MG tablet Take 1 tablet (25 mg total) by mouth once daily. 30 tablet 11     warfarin (COUMADIN) 5 MG tablet Take 1 tablet (5 mg total) by mouth Daily. 35 tablet 5        Review of patient's allergies indicates:   Allergen Reactions    Pcn [penicillins] Hives       Past Medical History:   Diagnosis Date    Acute respiratory failure requiring reintubation 1/28/2022    CLARISSE (acute kidney injury) 1/11/2021    Diabetes mellitus     Kidney stones      Past Surgical History:   Procedure Laterality Date    CARDIAC CATHETERIZATION  2010    no stents    CARDIAC DEFIBRILLATOR PLACEMENT  2010    CARDIAC DEFIBRILLATOR PLACEMENT      HERNIA REPAIR      IRRIGATION OF MEDIASTINUM N/A 1/14/2021    Procedure: IRRIGATION, MEDIASTINUM;  Surgeon: Zenon Corona MD;   [address]  Service: Cardiovascular;  Laterality: N/A;    KNEE ARTHROSCOPY Right     LEFT VENTRICULAR ASSIST DEVICE Left 1/13/2021    Procedure: INSERTION- HeartMate 3 LEFT VENTRICULAR ASSIST DEVICE ;  Surgeon: Zenon Corona MD;  [facility];  Service: Cardiovascular;  Laterality: Left;    RECONSTRUCTION OF PERICARDIUM N/A 1/14/2021    Procedure: RECONSTRUCTION, PERICARDIUM;  Surgeon: Zenon Corona MD;  [facility];  Service: Cardiovascular;  Laterality: N/A;    RIGHT HEART CATHETERIZATION Right 11/2/2020    Procedure: INSERTION, CATHETER, RIGHT HEART;  Surgeon: Deana Lake MD;  Location: [facility] CATH LAB;  Service: Cardiology;  Laterality: Right;    RIGHT HEART CATHETERIZATION Right 3/24/2022    Procedure: INSERTION, CATHETER, RIGHT HEART;  Surgeon: Manuel Ramos Jr., MD;  Location: [facility] CATH LAB;  Service: Cardiology;  Laterality: Right;    STERNAL WOUND CLOSURE  1/13/2021    Procedure: TEMPORARY CLOSURE OF CHEST;  Surgeon: Zenon Corona MD;  [facility];  Service: Cardiovascular;;    STERNAL WOUND CLOSURE N/A 1/14/2021    Procedure: CLOSURE, WOUND, STERNUM;  Surgeon: Zenon Corona MD;  [facility];  Service: Cardiovascular;  Laterality: N/A;     Family History       Problem Relation (Age of Onset)    Heart attack Mother, Brother    Heart failure Brother    Hypertension Brother          Tobacco Use    Smoking status: Some Days     Types: Cigarettes    Smokeless tobacco: Never   Substance and Sexual Activity    Alcohol use: No    Drug use: Not on file    Sexual activity: Not on file     Review of Systems   Constitutional:  Negative for chills and fever.   HENT:  Negative for trouble swallowing and voice change.    Eyes:  Negative for photophobia and visual disturbance.   Respiratory:  Negative for chest tightness and shortness of breath.    Gastrointestinal:  Negative for nausea and vomiting.   Musculoskeletal:  Positive for  arthralgias and myalgias. Negative for back pain and neck pain.   Neurological:  Positive for weakness. Negative for facial asymmetry.   Objective:        There is no height or weight on file to calculate BMI.  Vital Signs (Most Recent):  Temp: 98.1 °F (36.7 °C) (05/18/23 2305)  Pulse: 86 (05/19/23 0057)  Resp: 17 (05/19/23 0057)  BP: 109/76 (05/19/23 0000)  SpO2: 100 % (05/19/23 0057) Vital Signs (24h Range):  Temp:  [97.7 °F (36.5 °C)-98.1 °F (36.7 °C)] 98.1 °F (36.7 °C)  Pulse:  [75-95] 86  Resp:  [12-37] 17  SpO2:  [100 %] 100 %  BP: (105-152)/(0-93) 109/76                                 Physical Exam         Neurosurgery Physical Exam  General: no distress  Head: Non-traumatic, normocephalic  Eyes: Pupils equal, EOMi  Neck: Supple, normal ROM, no tenderness to palpation  CVS: Normal rate and rhythm, distal pulses present  Pulm: Symmetric expansion, no respiratory distress  GI: Abdomen nondistended, nontender  MSK: Moves all extremities without restriction, atraumatic  Skin: Dry, intact  Psych: Normal thought content and cognition  Neuro: AOx3, GCS E4V5M6  CNII-XII: Intact on fine exam, PERRL, EOMi, facial sensation preserved, no facial asymmetry, tongue/uvula/palate midline, shoulder shrug equal, No pronator drift  Extremities:  Motor:  Upper Extremity:                                  Deltoids        Triceps        Biceps        WE        WF                Interosseous           R        3-/5              3/5              3/5            4/5         4/5         3/5                3/5           L        5/5              5/5              5/5            5/5         5/5         5/5                5/5  Lower Extremity:                                      HF        KE        KF        DF        PF        EHL           R       5/5        5/5        5/5        5/5        5/5        5/5           L       5/5        5/5        5/5        5/5        5/5        5/5    Reflexes:     DTR: 2+ and symmetrically  throughout     Bolaños's: Negative      Sensory:      Sensorium intact throughout, no sensory level present    Coordination:      Coordination intact throughout    Cervical Spine:      ROM: Full with flexion, extension, lateral rotation and ear-to-shoulder bend.      Midline TTP: Negative         Significant Labs:  Recent Labs   Lab 05/18/23 1939   *      K 3.9      CO2 23   BUN 13   CREATININE 0.7   CALCIUM 9.1   MG 2.2     Recent Labs   Lab 05/18/23 1939   WBC 11.72   HGB 8.8*   HCT 29.6*        Recent Labs   Lab 05/18/23 1939   INR 3.2*     Microbiology Results (last 7 days)       ** No results found for the last 168 hours. **          All pertinent labs from the last 24 hours have been reviewed.    Significant Diagnostics:  I have reviewed and interpreted all pertinent imaging results/findings within the past 24 hours.    CT Head Without Contrast    Result Date: 5/18/2023  Similar appearing right hypodense extra-axial fluid collection.  No significant mass effect midline shift.  No new hemorrhage. Electronically signed by resident: Albaro Guadarrama Date:    05/18/2023 Time:    22:09 Electronically signed by: Bertin Cadet MD Date:    05/18/2023 Time:    22:29

## 2023-05-19 NOTE — ASSESSMENT & PLAN NOTE
-HeartMate 3 Implanted 1/13/21 as DT  -HOLD Coumadin, 1.5-2.0. Supratherapeutic today. (previous INR goal was 2.0-2.5 but decreased this admission).  Home dose of Coumadin was 2.5mg Monday and 5mg Qdaily  -Antiplatelets Not on ASA given SDH  -LDH is stable overall today. Will continue to monitor daily.  -Speed set at 5200 rpm  -Interrogation notable for PI events  -Not listed for OHTx    Procedure: Device Interrogation Including analysis of device parameters  Current Settings: Ventricular Assist Device  Review of device function is stable    TXP LVAD INTERROGATIONS 5/19/2023 5/19/2023 5/19/2023 5/19/2023 5/19/2023 5/19/2023 5/19/2023   Type HeartMate3 HeartMate3 HeartMate3 HeartMate3 HeartMate3 HeartMate3 HeartMate3   Flow 4.4 4.5 4.7 4.4 4.3 4.2 4.2   Speed 5200 5250 5250 5200 5200 5200 5200   PI 5.7 5 4.2 5.6 5.6 6.4 5.4   Power (Edwards) 3.7 3.7 3.6 3.7 3.7 3.6 3.6   LSL 4800 - - - 4800 4800 4800   Pulsatility Pulse - - - Pulse Pulse Pulse

## 2023-05-19 NOTE — PROGRESS NOTES
Pt had a nonsustained run of vtach this morning.  Pt asymptomatic, sitting up in chair.  K+ 3.8.  S. Herrera notified; Potassium Chloride 20mEq PO ordered for replacement.  Will administer as ordered.

## 2023-05-19 NOTE — TELEPHONE ENCOUNTER
CICU called to notify of patient arrival. Patient's wife was able to bring emergency nam. KIMI Brannon to collect loaner bag from unit.

## 2023-05-19 NOTE — ASSESSMENT & PLAN NOTE
-Hx of orthostatic hypotension with falls and lightheadedness, for which his BP meds were reduced dating back to 7/13/2022. -Recent event had been preceeded by standing up and walking to the bathroom then sudden fall when looking down.   - Monitoring BP   -Will get PT/OT evaluation

## 2023-05-19 NOTE — HPI
67M with non-ischemic cardiomyopathy status post DT HM3 implantation 1/13/2021 transferred for evaluation of known R frontal chronic SDH.  Last INR on records 3.4. Mr. France says on Saturday 05/13 around 5am he stood up used the bathroom and went to his chair. He went to grab something on the floor when all of sudden his legs gave out, feels weak in R forearm/hand. No neck pain.  States he went to sleep until 9am when he stood up to the bathroom and as he was walking back to the bed he look down and same episode happen. His legs gave out and he fell to the floor. He then called his neighbor to help him stand up.  He denied any presyncopal symptoms, confusion, diarrhea or any changes that could have precipitated his symptoms. He has home health who monitor his BP but he does not remember his usual numbers. His wife also noticed a new R. Hand tremor.      Patient had been admitted in the past on 1/27/22 after a syncopal event and was found to have an evolving R acute subdural hematoma plus acute basal cistern subarachnoid hemorrhage. At that time patient was intubated and had a prolonged hospitalization complicated by COVID.    CTH on arrival to [facility] stable compared to prior imaging from March 2023, no new acute hemorrhage.      Warfarin daily, INR 3.4

## 2023-05-19 NOTE — ASSESSMENT & PLAN NOTE
-hx of Chronic R subdural hematoma  -Neurosurgery consult  -Repeat CT unchanged from previous  -Unable to complete MRI to evaluate R weakness due to VAD  -PT/OT evaluation  -Will decrease INR goal to 1.5-2.0 going forward   -Will resume coumadin once INR is down  -INR was 3.2 on admit and is 2.9 today

## 2023-05-19 NOTE — ASSESSMENT & PLAN NOTE
Procedure: Device Interrogation Including analysis of device parameters  Current Settings: Ventricular Assist Device  Review of device function is stable/unstable stable. No VAD alarms upon review.     TXP LVAD INTERROGATIONS 5/18/2023 5/18/2023 5/18/2023 4/5/2023 1/26/2023 9/16/2022 9/16/2022   Type HeartMate3 HeartMate3 HeartMate3 - - HeartMate3 HeartMate3   Flow 4.3 4.2 4 - - 4.5 4.5   Speed 5200 5250 5200 - - 5200 5250   PI 6.6 7.2 8.3 - - 5.0 5.4   Power (Edwards) 3.8 3.7 3.7 - - 3.7 3.6   LSL - 4800 4800 - - 4800 4800   Pulsatility Pulse Pulse Pulse No Pulse No Pulse Pulse Pulse

## 2023-05-19 NOTE — PROGRESS NOTES
Patient was issued a loaner emergency bag yesterday (5/18) after coming in to the hospital without his emergency bag. His family was able to bring in his emergency bag for him to the hospital, so the loaner emergency bag was returned back to the VAD equipment stock room today.     Loaner Equipment Returned (Emergency Bag):    Backup controller: [id number]  Batteries: [id number], [id number]  Battery Clips: [id number]

## 2023-05-19 NOTE — ASSESSMENT & PLAN NOTE
Hx of iron deficiency and acute blood loss. On chronic anticoagulation.     -Continue supplemental iron

## 2023-05-19 NOTE — PROGRESS NOTES
Admit Note     Met with pt to assess needs. Patient is a 67 y.o.  male, admitted for Subdural hematoma [S06.5XAA]. Subdural hematoma, nontraumatic [I62.00] per medical record. Pt's LVAD was implanted 1/2021. The pt does his own dressing changes.     Patient transferred from [facility] on 5/18/[facility].  At this time, patient presents as alert and oriented x4, good eye contact, calm, and communicative.      At this time, patients caregiver is not present.    Household/Family Systems     Patient resides with patient's spouse, at    [address] [place] [address].  1 hour and 45 minutes from [facility]    Support system includes spouse, siblings, adult children, adult step son and nephew. Pt does not have dependents that are in need of being cared for.      Patients primary caregiver is self with support from spouse when indicated.    Pt's cell:   [phone]    Additional contacts:  Meme Román (spouse, HCPA, drives at home) [phone]  Laura Barron (Daughter) [phone] (Lives in [place])  Desi Blanco (pt's sister, lives near pt) [phone]    During admission, patient's caregiver plans to stay at home.  Confirmed patient and patients caregivers do have access to reliable transportation.    Cognitive Status/Learning     Patient reports reading ability as 12th grade and states patient does not have difficulty with hearing. The pt reports difficulty with seeing  and wears glasses to read. Pt reports short term memory issues, and he has to stay focused.  Due to strokes, pt has some paralysis on right side which makes writing challenging. Patient reports patient learns best by visual and one on one and reports additional time and support is needed to learn new information.  Needed: no. Highest education level: 12th    Vocation/Disability   .  Working for Income: no, pt receives Disability.  Patient used to be a , however due to heart issues the pt became disabled in 2007.   Pt's spouse is  retired.     Adherence     Patient reports a high level of adherence to patients health care regimen. Adherence counseling and education provided. Patient verbalizes understanding.    Substance Use    Patient reports the following substance usage.    Tobacco: pt reports he smokes a cigarette when he gets an urge to do so.  Alcohol: pt reports he drinks an occasional beer.  Illicit Drugs/Non-prescribed Medications: none, pt denies any use.  Patient states clear understanding of the potential impact of substance use.  Substance abstinence/cessation counseling, education and resources provided and reviewed.     Services Utilizing/ADLS    Infusion Service: Prior to admission, patient utilizing? no  Home Health: Prior to admission, patient utilizing? Yes, Home Health ,  [phone], fax [phone].  Pt reports skilled nurse goes over meds and takes vital signs.  Pt has labs done at hospital.  Pt would like to continue with HH when discharged and would like to use the same company.   DME: Prior to admission, yes, cane (pt tries not to use the cane often, worried about dependence), and shower chair.   Pulmonary/Cardiac Rehab: Prior to admission, no  Dialysis:  Prior to admission, no  Transplant Specialty Pharmacy:  Prior to admission, no    Prior to admission, patient reports patient was independent  with ADLS and was driving.  Pt reports he takes his time when getting ready to go somewhere. Patient reports patient is able to care for himself at this time. Patient indictes a willingness to care for self once medically cleared to do so.    Insurance/Medications    Insured by   Payer/Plan Subscr  Sex Relation Sub. Ins. ID Effective Group Num   1. PEOPLES GAYATHRI* KRISTINAELANA ESTRADA 1955 Male Self [id number]                                    [id number]      Primary Insurance (for UNOS reporting): Medicare FFS  Secondary Insurance (for UNOS reporting): none    NOTE: pt reports an medication he  is on for depression is expensive but he is able to get it.    Living Will/Healthcare Power of     Patient states patient has a LW and/or HCPA.  Pt's spouse is his HCPA.  provided education regarding LW and HCPA and the completion of forms.    Coping/Mental Health    Patient reports he's coping well with hospital stay.  Patient denies mental health difficulties.  The pt reports he attends [employer] at [employer].     Discharge Planning    At time of discharge, patient plans to return to his own home under the care of self and spouse.  Patients wife or dgtr will transport patient.  Per rounds today, expected discharge date has not been medically determined at this time. Patient verbalizes understanding and is involved in treatment planning and discharge process.    Additional Concerns    Patients denies additional needs and or concerns at this time.  Pt is being followed for needs, education, resources, information, emotional support, supportive counseling and for supportive and skilled discharge plan of care.  providing ongoing psychosocial support, education, resources and discharge planning as needed.  SW remains available.

## 2023-05-19 NOTE — PLAN OF CARE
Plan of care discussed with patient.  Patient ambulating with stand by assist, fall precautions in place. LVAD DP and numbers WNL, smooth LVAD hum. Patient has no complaints of pain. Discussed medications and care. Patient has no questions at this time. RN offered LVAD dressing change, pt refused.       Problem: Adult Inpatient Plan of Care  Goal: Plan of Care Review  Outcome: Ongoing, Progressing  Goal: Patient-Specific Goal (Individualized)  Outcome: Ongoing, Progressing  Goal: Absence of Hospital-Acquired Illness or Injury  Outcome: Ongoing, Progressing  Goal: Optimal Comfort and Wellbeing  Outcome: Ongoing, Progressing  Goal: Readiness for Transition of Care  Outcome: Ongoing, Progressing     Problem: Diabetes Comorbidity  Goal: Blood Glucose Level Within Targeted Range  Outcome: Ongoing, Progressing     Problem: Fall Injury Risk  Goal: Absence of Fall and Fall-Related Injury  Outcome: Ongoing, Progressing

## 2023-05-19 NOTE — HPI
Mr. Rocco France is a pleasant 67 y.o.  male with a past medical history remarkable for non-ischemic cardiomyopathy status post DT HM3 implantation 1/13/2021. He was transferred today from [facility] for neurosurgical evaluation of a R. Subdural hematoma seen on CT head today. Last INR on records 3.4. Mr. France says on Saturday 05/13 around 5am he stood up used the bathroom and went to his chair. He went to grab something on the floor when all of sudden his legs gave out and he slip to the floor. While trying to pick himself up he noticed his R. Sided was weak and had to forced himself up with his Left side. He went to sleep until 9am when he stood up to the bathroom and as he was walking back to the bed he look down and same episode happen. His legs gave out and he fell to the floor. He then called his neighbor to help him stand up. Denied any head trauma but his R. Arm has been hurting which he was told in the ER at the OSH that he had no fractures. Per records XR read as Right humerus negative. He denied any presyncopal symptoms, confusion, diarrhea or any changes that could have precipitated his symptoms. He has home health who monitor his BP but he does not remember his usual numbers. His wife also noticed a new R. Hand tremor. On arrival to the unit patient was AAOx4 with mild weakness on RUE only plus a intermittent right hand tremor.     Patient had been admitted in the past on 1/27/22 after a syncopal event and was found to have an evolving R. Cerebral acute subdural hematoma plus acute basal cistern subarachnoid hemorrhage. At that time patient was intubated and had a prolonged hospitalization complicated by COVID-19. He was taken off of aspirin and discharged home 2/17/2022    CT head w/o contrast from OSH read: Small right subdural hematoma. No associated mass effect. No midline shift. Interpretation time 15:45 05/18/2023.     PMHx also remarkable for:   -Acute on chronic  systolic/diastolic HF  -CAD  - PAF  - DM2  - Orthostatic hypotension  - HTN

## 2023-05-19 NOTE — PROGRESS NOTES
[facility] - Cardiac Intensive Care  Heart Transplant  Progress Note    Patient Name: Rocco France  MRN: [id number]  Admission Date: 5/18/2023  Hospital Length of Stay: 1 days  Attending Physician: Mike Chauhan MD  Primary Care Provider: Jay Guardado DO  Principal Problem:Subdural hematoma, nontraumatic    Subjective:     Interval History: Patient transferred from outside facility with concerns of SDH.  He has hx R frontal chronic SDH.  Neurosurgery consulted and CT of head here stable compared to prior imaging from March 2023.  No acute findings.  No neurosurgical intervention warranted.  Recommending MRI of brain given RUE weakness/gait instability; however, unable to complete given LVAD.  Will decrease INR goal to 1.5-2.0 and get PT/OT evaluation.  Will transfer to the floor today.  Patient reports feeling well and at baseline.     Continuous Infusions:  Scheduled Meds:   amLODIPine  5 mg Oral Daily    atorvastatin  80 mg Oral Daily    digoxin  0.125 mg Oral Daily    docusate sodium  100 mg Oral Daily    ferrous gluconate  324 mg Oral Daily with breakfast    furosemide  40 mg Oral Daily    lisinopriL  5 mg Oral Daily    magnesium oxide  400 mg Oral TID    mirtazapine  30 mg Oral QHS    polyethylene glycol  17 g Oral Daily    SITagliptin phosphate  100 mg Oral Daily    spironolactone  25 mg Oral Daily     PRN Meds:acetaminophen, dextrose 10%, dextrose 10%, dextrose, dextrose, glucagon (human recombinant), insulin aspart U-100, ondansetron, oxyCODONE, sodium chloride 0.9%    Review of patient's allergies indicates:   Allergen Reactions    Pcn [penicillins] Hives     Objective:     Vital Signs (Most Recent):  Temp: 97.9 °F (36.6 °C) (05/19/23 1115)  Pulse: 82 (05/19/23 1115)  Resp: 13 (05/19/23 1115)  BP: 90/68 (05/19/23 1115)  SpO2: 100 % (05/19/23 1115) Vital Signs (24h Range):  Temp:  [97.7 °F (36.5 °C)-98.1 °F (36.7 °C)] 97.9 °F (36.6 °C)  Pulse:  [75-95] 82  Resp:  [12-37] 13  SpO2:  [95 %-100  %] 100 %  BP: ()/(0-93) 90/68     Patient Vitals for the past 72 hrs (Last 3 readings):   Weight   05/19/23 0600 67.6 kg (149 lb 0.5 oz)     Body mass index is 19.13 kg/m².      Intake/Output Summary (Last 24 hours) at 5/19/2023 1133  Last data filed at 5/19/2023 1115  Gross per 24 hour   Intake 240 ml   Output 625 ml   Net -385 ml       Hemodynamic Parameters:       Telemetry: reviewed     Physical Exam   Constitutional:       General: He is not in acute distress.     Appearance: Normal appearance. He is normal weight. He is not ill-appearing, toxic-appearing or diaphoretic.   HENT:      Head: Normocephalic and atraumatic.      Nose: Nose normal. No congestion.      Mouth/Throat:      Mouth: Mucous membranes are dry.      Pharynx: Oropharynx is clear.   Eyes:      Extraocular Movements: Extraocular movements intact.      Conjunctiva/sclera: Conjunctivae normal.      Pupils: Pupils are equal, round, and reactive to light.   Cardiovascular:      Rate and Rhythm: Normal rate and regular rhythm.      Pulses: Normal pulses.      Comments: Lvad hum  Pulmonary:      Effort: Pulmonary effort is normal. No respiratory distress.      Breath sounds: Normal breath sounds. No wheezing or rhonchi.   Abdominal:      General: Abdomen is flat. Bowel sounds are normal.      Palpations: Abdomen is soft.   Musculoskeletal:         General: No swelling or tenderness. Normal range of motion.      Cervical back: Normal range of motion and neck supple. No rigidity or tenderness.   Skin:     General: Skin is warm and dry.      Capillary Refill: Capillary refill takes less than 2 seconds.   Neurological:      Mental Status: He is alert and oriented to person, place, and time.      Motor: Weakness present.      Comments: R arm weakness   Psychiatric:         Mood and Affect: Mood normal.         Thought Content: Thought content normal.     Significant Labs:  CBC:  Recent Labs   Lab 05/18/23  1939 05/19/23  0342   WBC 11.72 9.76   RBC  3.25* 3.09*   HGB 8.8* 8.5*   HCT 29.6* 27.9*    219   MCV 91 90   MCH 27.1 27.5   MCHC 29.7* 30.5*     BNP:  No results for input(s): BNP in the last 168 hours.    Invalid input(s): BNPTRIAGELBLO  CMP:  Recent Labs   Lab 05/18/23 1939 05/19/23  0342   * 106  106   CALCIUM 9.1 8.3*  8.3*   ALBUMIN 2.4* 2.0*   PROT 9.6* 8.3    135*  135*   K 3.9 3.8  3.8   CO2 23 23 23    106  106   BUN 13 12  12   CREATININE 0.7 0.6  0.6   ALKPHOS 105 95   ALT 12 10   AST 18 16   BILITOT 0.8 0.6      Coagulation:   Recent Labs   Lab 05/15/23  0000 05/18/23 1939 05/19/23 0342   INR 2.3 3.2* 2.9*     LDH:  Recent Labs   Lab 05/18/23 2027        Microbiology:  Microbiology Results (last 7 days)       ** No results found for the last 168 hours. **            I have reviewed all pertinent labs within the past 24 hours.    Estimated Creatinine Clearance: 114.2 mL/min (based on SCr of 0.6 mg/dL).    Diagnostic Results:  I have reviewed all pertinent imaging results/findings within the past 24 hours.    Assessment and Plan:     Mr. Rocco France is a pleasant 67 y.o.  male with a past medical history remarkable for non-ischemic cardiomyopathy status post DT HM3 implantation 1/13/2021. He was transferred today from [facility] [place] for neurosurgical evaluation of a R. Subdural hematoma seen on CT head today. Last INR on records 3.4. Mr. France says on Saturday 05/13 around 5am he stood up used the bathroom and went to his chair. He went to grab something on the floor when all of sudden his legs gave out and he slip to the floor. While trying to pick himself up he noticed his R. Sided was weak and had to forced himself up with his Left side. He went to sleep until 9am when he stood up to the bathroom and as he was walking back to the bed he look down and same episode happen. His legs gave out and he fell to the floor. He then called his neighbor to help him stand up.  Denied any head trauma but his R. Arm has been hurting which he was told in the ER at the OSH that he had no fractures. Per records XR read as Right humerus negative. He denied any presyncopal symptoms, confusion, diarrhea or any changes that could have precipitated his symptoms. He has home health who monitor his BP but he does not remember his usual numbers. His wife also noticed a new R. Hand tremor. On arrival to the unit patient was AAOx4 with mild weakness on RUE only plus a intermittent right hand tremor.     Patient had been admitted in the past on 1/27/22 after a syncopal event and was found to have an evolving R. Cerebral acute subdural hematoma plus acute basal cistern subarachnoid hemorrhage. At that time patient was intubated and had a prolonged hospitalization complicated by COVID-19. He was taken off of aspirin and discharged home 2/17/2022    CT head w/o contrast from OSH read: Small right subdural hematoma. No associated mass effect. No midline shift. Interpretation time 15:45 05/18/2023.     PMHx also remarkable for:   -Acute on chronic systolic/diastolic HF  -CAD  - PAF  - DM2  - Orthostatic hypotension  - HTN        * Subdural hematoma, nontraumatic  -hx of Chronic R subdural hematoma  -Neurosurgery consult  -Repeat CT unchanged from previous  -Unable to complete MRI to evaluate R weakness due to VAD  -PT/OT evaluation  -Will decrease INR goal to 1.5-2.0 going forward   -Will resume coumadin once INR is down  -INR was 3.2 on admit and is 2.9 today      LVAD (left ventricular assist device) present  -HeartMate 3 Implanted 1/13/21 as DT  -HOLD Coumadin, 1.5-2.0. Supratherapeutic today. (previous INR goal was 2.0-2.5 but decreased this admission).  Home dose of Coumadin was 2.5mg Monday and 5mg Qdaily  -Antiplatelets Not on ASA given SDH  -LDH is stable overall today. Will continue to monitor daily.  -Speed set at 5200 rpm  -Interrogation notable for PI events  -Not listed for OHTx    Procedure:  Device Interrogation Including analysis of device parameters  Current Settings: Ventricular Assist Device  Review of device function is stable    TXP LVAD INTERROGATIONS 5/19/2023 5/19/2023 5/19/2023 5/19/2023 5/19/2023 5/19/2023 5/19/2023   Type HeartMate3 HeartMate3 HeartMate3 HeartMate3 HeartMate3 HeartMate3 HeartMate3   Flow 4.4 4.5 4.7 4.4 4.3 4.2 4.2   Speed 5200 5250 5250 5200 5200 5200 5200   PI 5.7 5 4.2 5.6 5.6 6.4 5.4   Power (Edwards) 3.7 3.7 3.6 3.7 3.7 3.6 3.6   LSL 4800 - - - 4800 4800 4800   Pulsatility Pulse - - - Pulse Pulse Pulse       Chronic combined systolic and diastolic heart failure  -NICM  -Last 2D Echo 4/5/23: LVEF 20%, LVEDD 6.48 cm with speed at 5100.  Will repeat echo  -Euvolemic on examination today  -Current diuretic regimen: Lasix 40mg Qdaily  -GDMT with home dose of Lisinopril, Digoxin, Aldactone  -2g Na dietary restriction, 1500 mL fluid restriction, strict I/Os      Anticoagulated  -INR 3.2 on admit  -Coumadin held  -INR goal will change to 1.5-2.0      Normocytic anemia  -Hx of iron deficiency and acute blood loss. On chronic anticoagulation.   -Continue supplemental iron    Orthostatic hypotension  -Hx of orthostatic hypotension with falls and lightheadedness, for which his BP meds were reduced dating back to 7/13/2022. -Recent event had been preceeded by standing up and walking to the bathroom then sudden fall when looking down.   - Monitoring BP   -Will get PT/OT evaluation    Type 2 diabetes mellitus without complication  -A1c 6.5 8 months ago. Keeping on ISS.   -Is on Januvia at home     ICD (implantable cardioverter-defibrillator) in place  -No ICD shocks reported.         CT Elizabeth  Heart Transplant  [facility] - Cardiac Intensive [facility]

## 2023-05-19 NOTE — PROGRESS NOTES
05/19/2023  Alex Hutchins    Current provider:  Mike Chauhan MD    Device interrogation:  TXP LVAD INTERROGATIONS 5/19/2023 5/19/2023 5/19/2023 5/19/2023 5/19/2023 5/19/2023 5/19/2023   Type HeartMate3 HeartMate3 HeartMate3 HeartMate3 HeartMate3 HeartMate3 HeartMate3   Flow 4.5 4.7 4.4 4.3 4.2 4.2 4.3   Speed 5250 5250 5200 5200 5200 5200 5200   PI 5 4.2 5.6 5.6 6.4 5.4 5.5   Power (Edwards) 3.7 3.6 3.7 3.7 3.6 3.6 3.6   LSL - - - 4800 4800 4800 4800   Pulsatility - - - Pulse Pulse Pulse Pulse          Rounded on Rocco France to ensure all mechanical assist device settings (IABP or VAD) were appropriate and all parameters were within limits.  I was able to ensure all back up equipment was present, the staff had no issues, and the Perfusion Department daily rounding was complete.      For implantable VADs: Interrogation of Ventricular assist device was performed with analysis of device parameters and review of device function. I have personally reviewed the interrogation findings and agree with findings as stated.     In emergency, the nursing units have been notified to contact the perfusion department either by:  Calling [phone] from 630am to 4pm Mon thru Fri, utilizing the On-Call Finder functionality of Epic and searching for Perfusion, or by contacting the hospital  from 4pm to 630am and on weekends and asking to speak with the perfusionist on call.    10:35 AM

## 2023-05-19 NOTE — H&P
[facility] - Cardiac [facility]  Heart Transplant  H&P    Patient Name: Rocco France  MRN: [id number]  Admission Date: 5/18/2023  Attending Physician: Mike Chauhan MD  Primary Care Provider: Jay Guardado DO  Principal Problem:Subdural hematoma, nontraumatic    Subjective:     History of Present Illness:  Mr. Rocco France is a pleasant 67 y.o.  male with a past medical history remarkable for non-ischemic cardiomyopathy status post DT HM3 implantation 1/13/2021. He was transferred today from [facility] for neurosurgical evaluation of a R. Subdural hematoma seen on CT head today. Last INR on records 3.4. Mr. France says on Saturday 05/13 around 5am he stood up used the bathroom and went to his chair. He went to grab something on the floor when all of sudden his legs gave out and he slip to the floor. While trying to pick himself up he noticed his R. Sided was weak and had to forced himself up with his Left side. He went to sleep until 9am when he stood up to the bathroom and as he was walking back to the bed he look down and same episode happen. His legs gave out and he fell to the floor. He then called his neighbor to help him stand up. Denied any head trauma but his R. Arm has been hurting which he was told in the ER at the OSH that he had no fractures. Per records XR read as Right humerus negative. He denied any presyncopal symptoms, confusion, diarrhea or any changes that could have precipitated his symptoms. He has home health who monitor his BP but he does not remember his usual numbers. His wife also noticed a new R. Hand tremor. On arrival to the unit patient was AAOx4 with mild weakness on RUE only plus a intermittent right hand tremor.     Patient had been admitted in the past on 1/27/22 after a syncopal event and was found to have an evolving R. Cerebral acute subdural hematoma plus acute basal cistern subarachnoid hemorrhage. At that time patient was intubated and had  a prolonged hospitalization complicated by COVID-19. He was taken off of aspirin and discharged home 2/17/2022    CT head w/o contrast from OSH read: Small right subdural hematoma. No associated mass effect. No midline shift. Interpretation time 15:45 05/18/2023.     PMHx also remarkable for:   -Acute on chronic systolic/diastolic HF  -CAD  - PAF  - DM2  - Orthostatic hypotension  - HTN        Past Medical History:   Diagnosis Date    Acute respiratory failure requiring reintubation 1/28/2022    CLARISSE (acute kidney injury) 1/11/2021    Diabetes mellitus     Kidney stones        Past Surgical History:   Procedure Laterality Date    CARDIAC CATHETERIZATION  2010    no stents    CARDIAC DEFIBRILLATOR PLACEMENT  2010    CARDIAC DEFIBRILLATOR PLACEMENT      HERNIA REPAIR      IRRIGATION OF MEDIASTINUM N/A 1/14/2021    Procedure: IRRIGATION, MEDIASTINUM;  Surgeon: Zenon Corona MD;  Location: [facility] OR [address] [place] [address];  Service: Cardiovascular;  Laterality: N/A;    KNEE ARTHROSCOPY Right     LEFT VENTRICULAR ASSIST DEVICE Left 1/13/2021    Procedure: INSERTION- HeartMate 3 LEFT VENTRICULAR ASSIST DEVICE ;  Surgeon: Zenon Corona MD;  Location: [address];  Service: Cardiovascular;  Laterality: Left;    RECONSTRUCTION OF PERICARDIUM N/A 1/14/2021    Procedure: RECONSTRUCTION, PERICARDIUM;  Surgeon: Zenon Corona MD;  Location: [facility] OR [address] [place] [address];  Service: Cardiovascular;  Laterality: N/A;    RIGHT HEART CATHETERIZATION Right 11/2/2020    Procedure: INSERTION, CATHETER, RIGHT HEART;  Surgeon: Deana Lake MD;  Location: [facility] CATH LAB;  Service: Cardiology;  Laterality: Right;    RIGHT HEART CATHETERIZATION Right 3/24/2022    Procedure: INSERTION, CATHETER, RIGHT HEART;  Surgeon: Manuel Ramos Jr., MD;  Location: [facility] CATH LAB;  Service: Cardiology;  Laterality: Right;    STERNAL WOUND CLOSURE  1/13/2021    Procedure: TEMPORARY CLOSURE OF CHEST;  Surgeon: Zenon Corona MD;  Location: [address];   Service: Cardiovascular;;    STERNAL WOUND CLOSURE N/A 1/14/2021    Procedure: CLOSURE, WOUND, STERNUM;  Surgeon: Zenon Corona MD;  Location: [facility] OR [address] [place] [address];  Service: Cardiovascular;  Laterality: N/A;       Review of patient's allergies indicates:   Allergen Reactions    Pcn [penicillins] Hives       Current Facility-Administered Medications   Medication    acetaminophen tablet 650 mg    [START ON 5/19/2023] amLODIPine tablet 5 mg    [START ON 5/19/2023] atorvastatin tablet 80 mg    [START ON 5/19/2023] digoxin tablet 0.125 mg    [START ON 5/19/2023] docusate sodium capsule 100 mg    [START ON 5/19/2023] ferrous gluconate tablet 324 mg    [START ON 5/19/2023] furosemide tablet 40 mg    [START ON 5/19/2023] lisinopriL tablet 5 mg    magnesium oxide tablet 400 mg    mirtazapine tablet 30 mg    ondansetron injection 4 mg    oxyCODONE immediate release tablet 5 mg    [START ON 5/19/2023] polyethylene glycol packet 17 g    [START ON 5/19/2023] SITagliptin phosphate tablet 100 mg    sodium chloride 0.9% flush 10 mL    [START ON 5/19/2023] spironolactone tablet 25 mg     Family History       Problem Relation (Age of Onset)    Heart attack Mother, Brother    Heart failure Brother    Hypertension Brother          Tobacco Use    Smoking status: Some Days     Types: Cigarettes    Smokeless tobacco: Never   Substance and Sexual Activity    Alcohol use: No    Drug use: Not on file    Sexual activity: Not on file     Review of Systems   Constitutional:  Negative for appetite change, chills, diaphoresis, fatigue and fever.   HENT:  Negative for hearing loss, sore throat, tinnitus and trouble swallowing.    Eyes:  Negative for photophobia and visual disturbance.   Respiratory:  Negative for cough, chest tightness and shortness of breath.    Cardiovascular:  Negative for chest pain, palpitations and leg swelling.   Gastrointestinal:  Negative for abdominal pain, constipation, diarrhea, nausea and vomiting.    Genitourinary:  Negative for dysuria and flank pain.   Musculoskeletal:  Negative for gait problem and neck pain.   Neurological:  Positive for tremors and weakness. Negative for dizziness, seizures, syncope, speech difficulty, light-headedness, numbness and headaches.   Psychiatric/Behavioral:  Negative for confusion. The patient is not nervous/anxious.    Objective:     Vital Signs (Most Recent):  Temp: 97.7 °F (36.5 °C) (05/18/23 1830)  Pulse: 88 (05/18/23 1830)  Resp: (!) 37 (05/18/23 1830)  BP: (!) 152/84 (05/18/23 1830)  SpO2: 100 % (05/18/23 1830) Vital Signs (24h Range):  Temp:  [97.7 °F (36.5 °C)-98.1 °F (36.7 °C)] 97.7 °F (36.5 °C)  Pulse:  [75-88] 88  Resp:  [16-37] 37  SpO2:  [100 %] 100 %  BP: (105-152)/(80-93) 152/84     No data found.  There is no height or weight on file to calculate BMI.      Intake/Output Summary (Last 24 hours) at 5/18/2023 2016  Last data filed at 5/18/2023 1830  Gross per 24 hour   Intake 0 ml   Output 0 ml   Net 0 ml          Physical Exam  Vitals reviewed.   Constitutional:       General: He is not in acute distress.     Appearance: Normal appearance. He is normal weight. He is not ill-appearing, toxic-appearing or diaphoretic.   HENT:      Head: Normocephalic and atraumatic.      Nose: Nose normal. No congestion.      Mouth/Throat:      Mouth: Mucous membranes are dry.      Pharynx: Oropharynx is clear.   Eyes:      Extraocular Movements: Extraocular movements intact.      Conjunctiva/sclera: Conjunctivae normal.      Pupils: Pupils are equal, round, and reactive to light.   Cardiovascular:      Rate and Rhythm: Normal rate and regular rhythm.      Pulses: Normal pulses.      Comments: Lvad hum  Pulmonary:      Effort: Pulmonary effort is normal. No respiratory distress.      Breath sounds: Normal breath sounds. No wheezing or rhonchi.   Abdominal:      General: Abdomen is flat. Bowel sounds are normal.      Palpations: Abdomen is soft.   Musculoskeletal:         General:  No swelling or tenderness. Normal range of motion.      Cervical back: Normal range of motion and neck supple. No rigidity or tenderness.   Skin:     General: Skin is warm and dry.      Capillary Refill: Capillary refill takes less than 2 seconds.   Neurological:      Mental Status: He is alert and oriented to person, place, and time.      Motor: Weakness present.      Comments: R arm weakness   Psychiatric:         Mood and Affect: Mood normal.         Thought Content: Thought content normal.          Significant Labs:  CBC:  Recent Labs   Lab 05/18/23 1939   WBC 11.72   RBC 3.25*   HGB 8.8*   HCT 29.6*      MCV 91   MCH 27.1   MCHC 29.7*     BNP:  No results for input(s): BNP in the last 168 hours.    Invalid input(s): BNPTRIAGELBLO  CMP:  Recent Labs   Lab 05/18/23 1939   *   CALCIUM 9.1   ALBUMIN 2.4*   PROT 9.6*      K 3.9   CO2 23         Coagulation:   Recent Labs   Lab 05/15/23  0000 05/18/23 1939   INR 2.3 3.2*     LDH:  No results for input(s): LDH in the last 72 hours.  Microbiology:  Microbiology Results (last 7 days)       ** No results found for the last 168 hours. **            I have reviewed all pertinent labs within the past 24 hours.    Diagnostic Results:  I have reviewed all pertinent imaging results/findings within the past 24 hours.      Assessment/Plan:     * Subdural hematoma, nontraumatic  Mr. Rocco France is a pleasant 67 y.o.  male with a past medical history remarkable for non-ischemic cardiomyopathy status post DT HM3 implantation 1/13/2021. He was transferred today from [facility] for neurosurgical evaluation of a R. Subdural hematoma w/ no midline shift seen on CT head today. Last INR on records 3.4. On arrival to the unit patient was AAOx4 with mild weakness on RUE only plus a intermittent right hand tremor. Patient had been admitted in the past on 1/27/22 after a syncopal event and was found to have an evolving R.  Cerebral acute subdural hematoma plus acute basal cistern subarachnoid hemorrhage. He was taken off of aspirin and discharged home 2/17/2022.     Plan:  -Neurosurgery consulted recommended repeat CT head and INR check : 3.4>>>3.2 +/- warfarin reversal depending on findings. We will follow up further recs  -Pt hemodynamically stable- we will continue with q 4hr neuro checks  - Warfarin has been held.       Normocytic anemia  Hx of iron deficiency and acute blood loss. On chronic anticoagulation.     -Continue supplemental iron    Orthostatic hypotension  Hx of orthostatic hypotension with falls and lightheadedness, for which his BP meds were reduced dating back to 7/13/2022. Recent event had been preceeded by standing up and walking to the bathroom then sudden fall when looking down.     -F/u CT head  - Monitoring BP     Chronic combined systolic and diastolic heart failure  NICM. LVAD in place. Not in exacerbation and euvolemic.     Anticoagulated  Chronically on warfarin 5mg- held    Recent INR 3.2    LVAD (left ventricular assist device) present  Procedure: Device Interrogation Including analysis of device parameters  Current Settings: Ventricular Assist Device  Review of device function is stable/unstable stable. No VAD alarms upon review.     TXP LVAD INTERROGATIONS 5/18/2023 5/18/2023 5/18/2023 4/5/2023 1/26/2023 9/16/2022 9/16/2022   Type HeartMate3 HeartMate3 HeartMate3 - - HeartMate3 HeartMate3   Flow 4.3 4.2 4 - - 4.5 4.5   Speed 5200 5250 5200 - - 5200 5250   PI 6.6 7.2 8.3 - - 5.0 5.4   Power (Edwards) 3.8 3.7 3.7 - - 3.7 3.6   LSL - 4800 4800 - - 4800 4800   Pulsatility Pulse Pulse Pulse No Pulse No Pulse Pulse Pulse       Type 2 diabetes mellitus without complication  A1c 6.5 8 months ago. Keeping on ISS.     ICD (implantable cardioverter-defibrillator) in place  No ICD shocks reported.         Aye Lind MD  Heart Transplant  [facility] - Cardiac Intensive Care

## 2023-05-19 NOTE — ASSESSMENT & PLAN NOTE
Mr. Rocco France is a pleasant 67 y.o.  male with a past medical history remarkable for non-ischemic cardiomyopathy status post DT HM3 implantation 1/13/2021. He was transferred today from [facility] for neurosurgical evaluation of a R. Subdural hematoma w/ no midline shift seen on CT head today. Last INR on records 3.4. On arrival to the unit patient was AAOx4 with mild weakness on RUE only plus a intermittent right hand tremor. Patient had been admitted in the past on 1/27/22 after a syncopal event and was found to have an evolving R. Cerebral acute subdural hematoma plus acute basal cistern subarachnoid hemorrhage. He was taken off of aspirin and discharged home 2/17/2022.     Plan:  -Neurosurgery consulted recommended repeat CT head and INR check : 3.4>>>3.2 +/- warfarin reversal depending on findings. We will follow up further recs  -Pt hemodynamically stable- we will continue with q 4hr neuro checks  - Warfarin has been held.

## 2023-05-19 NOTE — ASSESSMENT & PLAN NOTE
-NICM  -Last 2D Echo 4/5/23: LVEF 20%, LVEDD 6.48 cm with speed at 5100.  Will repeat echo  -Euvolemic on examination today  -Current diuretic regimen: Lasix 40mg Qdaily  -GDMT with home dose of Lisinopril, Digoxin, Aldactone  -2g Na dietary restriction, 1500 mL fluid restriction, strict I/Os

## 2023-05-19 NOTE — PROGRESS NOTES
Pt to be stepped down to CSU per MD orders.  Report called to NATHANAEL Sandoval.  Pt just received his lunch so will transport as soon as he's done eating.      1300  STAT CT Cervical Spine Without Contrast ordered.  Will bring pt to CT to complete test, then transfer to CSU.      1335  Pt escorted to CT via wheelchair.  Pt on portable telemetry and LVAD batteries.  LVAD backup bag transported with pt.  Pt then transferred to CSU.  Reported off to nurses Lori and Mallorie. Chart, LVAD equipment, and pt's cell phone sent with pt.

## 2023-05-20 PROBLEM — M62.81 MUSCLE WEAKNESS OF RIGHT UPPER EXTREMITY: Status: ACTIVE | Noted: 2023-05-20

## 2023-05-20 LAB
ALBUMIN SERPL BCP-MCNC: 2.2 G/DL (ref 3.5–5.2)
ALP SERPL-CCNC: 100 U/L (ref 55–135)
ALT SERPL W/O P-5'-P-CCNC: 8 U/L (ref 10–44)
ANION GAP SERPL CALC-SCNC: 6 MMOL/L (ref 8–16)
ANION GAP SERPL CALC-SCNC: 6 MMOL/L (ref 8–16)
AST SERPL-CCNC: 13 U/L (ref 10–40)
BASOPHILS # BLD AUTO: 0.04 K/UL (ref 0–0.2)
BASOPHILS NFR BLD: 0.4 % (ref 0–1.9)
BILIRUB SERPL-MCNC: 0.8 MG/DL (ref 0.1–1)
BUN SERPL-MCNC: 14 MG/DL (ref 8–23)
BUN SERPL-MCNC: 14 MG/DL (ref 8–23)
CALCIUM SERPL-MCNC: 9 MG/DL (ref 8.7–10.5)
CALCIUM SERPL-MCNC: 9 MG/DL (ref 8.7–10.5)
CHLORIDE SERPL-SCNC: 101 MMOL/L (ref 95–110)
CHLORIDE SERPL-SCNC: 101 MMOL/L (ref 95–110)
CO2 SERPL-SCNC: 28 MMOL/L (ref 23–29)
CO2 SERPL-SCNC: 28 MMOL/L (ref 23–29)
CREAT SERPL-MCNC: 0.7 MG/DL (ref 0.5–1.4)
CREAT SERPL-MCNC: 0.7 MG/DL (ref 0.5–1.4)
DIFFERENTIAL METHOD: ABNORMAL
EOSINOPHIL # BLD AUTO: 0.2 K/UL (ref 0–0.5)
EOSINOPHIL NFR BLD: 2.4 % (ref 0–8)
ERYTHROCYTE [DISTWIDTH] IN BLOOD BY AUTOMATED COUNT: 15.5 % (ref 11.5–14.5)
EST. GFR  (NO RACE VARIABLE): >60 ML/MIN/1.73 M^2
EST. GFR  (NO RACE VARIABLE): >60 ML/MIN/1.73 M^2
GLUCOSE SERPL-MCNC: 106 MG/DL (ref 70–110)
GLUCOSE SERPL-MCNC: 106 MG/DL (ref 70–110)
HCT VFR BLD AUTO: 30.1 % (ref 40–54)
HGB BLD-MCNC: 9 G/DL (ref 14–18)
IMM GRANULOCYTES # BLD AUTO: 0.04 K/UL (ref 0–0.04)
IMM GRANULOCYTES NFR BLD AUTO: 0.4 % (ref 0–0.5)
INR PPP: 1.9 (ref 0.8–1.2)
LYMPHOCYTES # BLD AUTO: 1.6 K/UL (ref 1–4.8)
LYMPHOCYTES NFR BLD: 16.9 % (ref 18–48)
MAGNESIUM SERPL-MCNC: 2.2 MG/DL (ref 1.6–2.6)
MCH RBC QN AUTO: 26.8 PG (ref 27–31)
MCHC RBC AUTO-ENTMCNC: 29.9 G/DL (ref 32–36)
MCV RBC AUTO: 90 FL (ref 82–98)
MONOCYTES # BLD AUTO: 1 K/UL (ref 0.3–1)
MONOCYTES NFR BLD: 10.1 % (ref 4–15)
NEUTROPHILS # BLD AUTO: 6.7 K/UL (ref 1.8–7.7)
NEUTROPHILS NFR BLD: 69.8 % (ref 38–73)
NRBC BLD-RTO: 0 /100 WBC
PHOSPHATE SERPL-MCNC: 2.6 MG/DL (ref 2.7–4.5)
PLATELET # BLD AUTO: 231 K/UL (ref 150–450)
PMV BLD AUTO: 9.4 FL (ref 9.2–12.9)
POCT GLUCOSE: 127 MG/DL (ref 70–110)
POCT GLUCOSE: 138 MG/DL (ref 70–110)
POCT GLUCOSE: 162 MG/DL (ref 70–110)
POTASSIUM SERPL-SCNC: 4.3 MMOL/L (ref 3.5–5.1)
POTASSIUM SERPL-SCNC: 4.3 MMOL/L (ref 3.5–5.1)
PROT SERPL-MCNC: 9.1 G/DL (ref 6–8.4)
PROTHROMBIN TIME: 19.3 SEC (ref 9–12.5)
RBC # BLD AUTO: 3.36 M/UL (ref 4.6–6.2)
SODIUM SERPL-SCNC: 135 MMOL/L (ref 136–145)
SODIUM SERPL-SCNC: 135 MMOL/L (ref 136–145)
WBC # BLD AUTO: 9.57 K/UL (ref 3.9–12.7)

## 2023-05-20 PROCEDURE — 93750 INTERROGATION VAD IN PERSON: CPT | Mod: ,,, | Performed by: INTERNAL MEDICINE

## 2023-05-20 PROCEDURE — 80053 COMPREHEN METABOLIC PANEL: CPT | Performed by: STUDENT IN AN ORGANIZED HEALTH CARE EDUCATION/TRAINING PROGRAM

## 2023-05-20 PROCEDURE — 27000248 HC VAD-ADDITIONAL DAY

## 2023-05-20 PROCEDURE — 99233 PR SUBSEQUENT HOSPITAL CARE,LEVL III: ICD-10-PCS | Mod: ,,, | Performed by: PHYSICIAN ASSISTANT

## 2023-05-20 PROCEDURE — 93750 PR INTERROGATE VENT ASSIST DEV, IN PERSON, W PHYSICIAN ANALYSIS: ICD-10-PCS | Mod: ,,, | Performed by: INTERNAL MEDICINE

## 2023-05-20 PROCEDURE — 36415 COLL VENOUS BLD VENIPUNCTURE: CPT | Performed by: STUDENT IN AN ORGANIZED HEALTH CARE EDUCATION/TRAINING PROGRAM

## 2023-05-20 PROCEDURE — 97161 PT EVAL LOW COMPLEX 20 MIN: CPT

## 2023-05-20 PROCEDURE — 85025 COMPLETE CBC W/AUTO DIFF WBC: CPT | Performed by: STUDENT IN AN ORGANIZED HEALTH CARE EDUCATION/TRAINING PROGRAM

## 2023-05-20 PROCEDURE — 85610 PROTHROMBIN TIME: CPT | Performed by: STUDENT IN AN ORGANIZED HEALTH CARE EDUCATION/TRAINING PROGRAM

## 2023-05-20 PROCEDURE — 25000003 PHARM REV CODE 250: Performed by: INTERNAL MEDICINE

## 2023-05-20 PROCEDURE — 25000003 PHARM REV CODE 250: Performed by: STUDENT IN AN ORGANIZED HEALTH CARE EDUCATION/TRAINING PROGRAM

## 2023-05-20 PROCEDURE — 99232 PR SUBSEQUENT HOSPITAL CARE,LEVL II: ICD-10-PCS | Mod: GC,,, | Performed by: NEUROLOGICAL SURGERY

## 2023-05-20 PROCEDURE — 99233 SBSQ HOSP IP/OBS HIGH 50: CPT | Mod: ,,, | Performed by: PHYSICIAN ASSISTANT

## 2023-05-20 PROCEDURE — 97116 GAIT TRAINING THERAPY: CPT

## 2023-05-20 PROCEDURE — 83735 ASSAY OF MAGNESIUM: CPT | Performed by: STUDENT IN AN ORGANIZED HEALTH CARE EDUCATION/TRAINING PROGRAM

## 2023-05-20 PROCEDURE — 84100 ASSAY OF PHOSPHORUS: CPT | Performed by: STUDENT IN AN ORGANIZED HEALTH CARE EDUCATION/TRAINING PROGRAM

## 2023-05-20 PROCEDURE — 99232 SBSQ HOSP IP/OBS MODERATE 35: CPT | Mod: GC,,, | Performed by: NEUROLOGICAL SURGERY

## 2023-05-20 PROCEDURE — 20600001 HC STEP DOWN PRIVATE ROOM

## 2023-05-20 RX ORDER — WARFARIN SODIUM 5 MG/1
5 TABLET ORAL
Status: DISCONTINUED | OUTPATIENT
Start: 2023-05-20 | End: 2023-05-23 | Stop reason: HOSPADM

## 2023-05-20 RX ORDER — WARFARIN 2.5 MG/1
2.5 TABLET ORAL
Status: DISCONTINUED | OUTPATIENT
Start: 2023-05-22 | End: 2023-05-23 | Stop reason: HOSPADM

## 2023-05-20 RX ADMIN — Medication 400 MG: at 03:05

## 2023-05-20 RX ADMIN — AMLODIPINE BESYLATE 5 MG: 5 TABLET ORAL at 08:05

## 2023-05-20 RX ADMIN — Medication 324 MG: at 08:05

## 2023-05-20 RX ADMIN — DIGOXIN 0.12 MG: 125 TABLET ORAL at 08:05

## 2023-05-20 RX ADMIN — OXYCODONE HYDROCHLORIDE 5 MG: 5 TABLET ORAL at 08:05

## 2023-05-20 RX ADMIN — WARFARIN SODIUM 5 MG: 5 TABLET ORAL at 05:05

## 2023-05-20 RX ADMIN — LISINOPRIL 5 MG: 5 TABLET ORAL at 08:05

## 2023-05-20 RX ADMIN — SPIRONOLACTONE 25 MG: 25 TABLET, FILM COATED ORAL at 08:05

## 2023-05-20 RX ADMIN — MIRTAZAPINE 30 MG: 30 TABLET, FILM COATED ORAL at 08:05

## 2023-05-20 RX ADMIN — ATORVASTATIN CALCIUM 80 MG: 40 TABLET, FILM COATED ORAL at 08:05

## 2023-05-20 RX ADMIN — DOCUSATE SODIUM 100 MG: 100 CAPSULE, LIQUID FILLED ORAL at 05:05

## 2023-05-20 RX ADMIN — Medication 400 MG: at 08:05

## 2023-05-20 RX ADMIN — OXYCODONE HYDROCHLORIDE 5 MG: 5 TABLET ORAL at 05:05

## 2023-05-20 RX ADMIN — FUROSEMIDE 40 MG: 40 TABLET ORAL at 08:05

## 2023-05-20 RX ADMIN — POLYETHYLENE GLYCOL 3350 17 G: 17 POWDER, FOR SOLUTION ORAL at 08:05

## 2023-05-20 NOTE — PROGRESS NOTES
[facility] - Cardiology Stepdown  Heart Transplant  Progress Note    Patient Name: Rocco France  MRN: [id number]  Admission Date: 5/18/2023  Hospital Length of Stay: 2 days  Attending Physician: Mike Chauhan MD  Primary Care Provider: Jay Guardado DO  Principal Problem:Subdural hematoma, nontraumatic    Subjective:     Interval History: CT of cervical spine done and consistent with multilevel degenerative changes.  Mod-sever spinal canal stenosis C2-C5.  Moderate neural foraminal narrowing C3-C4.  CT of head without contrast ordered.  He still complains of RUE weakness.  PT/OT consults in and awaiting evaluation.  Echo ordered.  INR goal changed to 1.5-2.0. Coumadin was held on admit in the setting of supratherapeutic INR.   INR 1.9 today so will resume      Continuous Infusions:  Scheduled Meds:   amLODIPine  5 mg Oral Daily    atorvastatin  80 mg Oral Daily    digoxin  0.125 mg Oral Daily    docusate sodium  100 mg Oral Daily    ferrous gluconate  324 mg Oral Daily with breakfast    furosemide  40 mg Oral Daily    lisinopriL  5 mg Oral Daily    magnesium oxide  400 mg Oral TID    mirtazapine  30 mg Oral QHS    polyethylene glycol  17 g Oral Daily    SITagliptin phosphate  100 mg Oral Daily    spironolactone  25 mg Oral Daily    [START ON 5/22/2023] warfarin  2.5 mg Oral Every Mon, Wed, Fri    warfarin  5 mg Oral Every Tues, Thurs, Sat, Sun     PRN Meds:acetaminophen, dextrose 10%, dextrose 10%, dextrose, dextrose, glucagon (human recombinant), insulin aspart U-100, ondansetron, oxyCODONE, sodium chloride 0.9%    Review of patient's allergies indicates:   Allergen Reactions    Pcn [penicillins] Hives     Objective:     Vital Signs (Most Recent):  Temp: 97.8 °F (36.6 °C) (05/20/23 0752)  Pulse: 86 (05/20/23 0752)  Resp: 16 (05/20/23 0752)  BP: 117/65 (05/20/23 0752)  SpO2: 100 % (05/20/23 0752) Vital Signs (24h Range):  Temp:  [97.2 °F (36.2 °C)-98.5 °F (36.9 °C)] 97.8 °F (36.6 °C)  Pulse:   [78-87] 86  Resp:  [13-23] 16  SpO2:  [98 %-100 %] 100 %  BP: ()/(0-76) 117/65     Patient Vitals for the past 72 hrs (Last 3 readings):   Weight   05/20/23 0752 68.1 kg (150 lb 2.1 oz)   05/19/23 0600 67.6 kg (149 lb 0.5 oz)       Body mass index is 19.28 kg/m².      Intake/Output Summary (Last 24 hours) at 5/20/2023 0929  Last data filed at 5/20/2023 0513  Gross per 24 hour   Intake 880 ml   Output 1075 ml   Net -195 ml         Hemodynamic Parameters:       Telemetry: reviewed     Physical Exam   Constitutional:       General: He is not in acute distress.     Appearance: Normal appearance. He is normal weight. He is not ill-appearing, toxic-appearing or diaphoretic.   HENT:      Head: Normocephalic and atraumatic.      Nose: Nose normal. No congestion.      Mouth/Throat:      Mouth: Mucous membranes are dry.      Pharynx: Oropharynx is clear.   Eyes:      Extraocular Movements: Extraocular movements intact.      Conjunctiva/sclera: Conjunctivae normal.      Pupils: Pupils are equal, round, and reactive to light.   Cardiovascular:      Rate and Rhythm: Normal rate and regular rhythm.      Pulses: Normal pulses.      Comments: Lvad hum  Pulmonary:      Effort: Pulmonary effort is normal. No respiratory distress.      Breath sounds: Normal breath sounds. No wheezing or rhonchi.   Abdominal:      General: Abdomen is flat. Bowel sounds are normal.      Palpations: Abdomen is soft.   Musculoskeletal:         General: No swelling or tenderness. Normal range of motion.      Cervical back: Normal range of motion and neck supple. No rigidity or tenderness.   Skin:     General: Skin is warm and dry.      Capillary Refill: Capillary refill takes less than 2 seconds.   Neurological:      Mental Status: He is alert and oriented to person, place, and time.      Motor: Weakness present.      Comments: R arm weakness   Psychiatric:         Mood and Affect: Mood normal.         Thought Content: Thought content normal.      Significant Labs:  CBC:  Recent Labs   Lab 05/18/23 1939 05/19/23 0342 05/20/23  0244   WBC 11.72 9.76 9.57   RBC 3.25* 3.09* 3.36*   HGB 8.8* 8.5* 9.0*   HCT 29.6* 27.9* 30.1*    219 231   MCV 91 90 90   MCH 27.1 27.5 26.8*   MCHC 29.7* 30.5* 29.9*       BNP:  No results for input(s): BNP in the last 168 hours.    Invalid input(s): BNPTRIAGELBLO  CMP:  Recent Labs   Lab 05/18/23 1939 05/19/23 0342 05/20/23  0244   * 106  106 106  106   CALCIUM 9.1 8.3*  8.3* 9.0  9.0   ALBUMIN 2.4* 2.0* 2.2*   PROT 9.6* 8.3 9.1*    135*  135* 135*  135*   K 3.9 3.8  3.8 4.3  4.3   CO2 23 23 23 28  28    106  106 101  101   BUN 13 12  12 14  14   CREATININE 0.7 0.6  0.6 0.7  0.7   ALKPHOS 105 95 100   ALT 12 10 8*   AST 18 16 13   BILITOT 0.8 0.6 0.8        Coagulation:   Recent Labs   Lab 05/18/23 1939 05/19/23 0342 05/20/23  0244   INR 3.2* 2.9* 1.9*       LDH:  Recent Labs   Lab 05/18/23 2027          Microbiology:  Microbiology Results (last 7 days)       ** No results found for the last 168 hours. **            I have reviewed all pertinent labs within the past 24 hours.    Estimated Creatinine Clearance: 98.6 mL/min (based on SCr of 0.7 mg/dL).    Diagnostic Results:  I have reviewed all pertinent imaging results/findings within the past 24 hours.    Assessment and Plan:     Mr. Rocco France is a pleasant 67 y.o.  male with a past medical history remarkable for non-ischemic cardiomyopathy status post DT HM3 implantation 1/13/2021. He was transferred today from [facility] for neurosurgical evaluation of a R. Subdural hematoma seen on CT head today. Last INR on records 3.4. Mr. France says on Saturday 05/13 around 5am he stood up used the bathroom and went to his chair. He went to grab something on the floor when all of sudden his legs gave out and he slip to the floor. While trying to pick himself up he noticed his R. Sided was weak and  had to forced himself up with his Left side. He went to sleep until 9am when he stood up to the bathroom and as he was walking back to the bed he look down and same episode happen. His legs gave out and he fell to the floor. He then called his neighbor to help him stand up. Denied any head trauma but his R. Arm has been hurting which he was told in the ER at the OSH that he had no fractures. Per records XR read as Right humerus negative. He denied any presyncopal symptoms, confusion, diarrhea or any changes that could have precipitated his symptoms. He has home health who monitor his BP but he does not remember his usual numbers. His wife also noticed a new R. Hand tremor. On arrival to the unit patient was AAOx4 with mild weakness on RUE only plus a intermittent right hand tremor.     Patient had been admitted in the past on 1/27/22 after a syncopal event and was found to have an evolving R. Cerebral acute subdural hematoma plus acute basal cistern subarachnoid hemorrhage. At that time patient was intubated and had a prolonged hospitalization complicated by COVID-19. He was taken off of aspirin and discharged home 2/17/2022    CT head w/o contrast from OSH read: Small right subdural hematoma. No associated mass effect. No midline shift. Interpretation time 15:45 05/18/2023.     PMHx also remarkable for:   -Acute on chronic systolic/diastolic HF  -CAD  - PAF  - DM2  - Orthostatic hypotension  - HTN        * Subdural hematoma, nontraumatic  -hx of Chronic R subdural hematoma  -Neurosurgery consult  -Repeat CT unchanged from previous  -Unable to complete MRI to evaluate R weakness due to VAD  -PT/OT evaluation  -Will decrease INR goal to 1.5-2.0 going forward   -Will resume coumadin today  -INR was 3.2 on admit and is 1.9 today      LVAD (left ventricular assist device) present  -HeartMate 3 Implanted 1/13/21 as DT  -Coumadin held on admit, New goal 1.5-2.0. therapeutic today. (previous INR goal was 2.0-2.5 but  decreased this admission).  Home dose of Coumadin was 2.5mg Monday and 5mg Qdaily  -Antiplatelets Not on ASA given SDH  -LDH is stable overall today. Will continue to monitor daily.  -Speed set at 5200 rpm  -Interrogation notable for PI events  -Not listed for OHTx    Procedure: Device Interrogation Including analysis of device parameters  Current Settings: Ventricular Assist Device  Review of device function is stable    TXP LVAD INTERROGATIONS 5/20/2023 5/20/2023 5/20/2023 5/19/2023 5/19/2023 5/19/2023 5/19/2023   Type HeartMate3 HeartMate3 HeartMate3 HeartMate3 HeartMate3 HeartMate3 HeartMate3   Flow 4.4 3.9 4.2 4.7 4.4 4.5 4.7   Speed 5200 5200 5200 5200 5200 5200 5200   PI 5.8 8.2 6.4 4.9 4.6 4.6 4.6   Power (Edwards) 3.7 3.7 3.7 3.7 3.5 3.6 3.6   LSL 4800 4800 4800 4800 4800 4800 -   Pulsatility Pulse Pulse Pulse Pulse Pulse Pulse -       Chronic combined systolic and diastolic heart failure  -NICM  -Last 2D Echo 4/5/23: LVEF 20%, LVEDD 6.48 cm with speed at 5100.  Will repeat echo  -Euvolemic on examination today  -Current diuretic regimen: Lasix 40mg Qdaily  -GDMT with home dose of Lisinopril, Digoxin, Aldactone  -2g Na dietary restriction, 1500 mL fluid restriction, strict I/Os      Muscle weakness of right upper extremity  - CT of cervical spine 5/19 consistent with multilevel degenerative changes.  Mod-sever spinal canal stenosis C2-C5.  Moderate neural foraminal narrowing C3-C4.  CT of head without contrast ordered.   -Unable to complete MRI due to VAD  -PT/OT consulted for evaluation and recommendations     Anticoagulated  -INR 3.2 on admit  -Coumadin held  -INR goal changed to 1.5-2.0  -INR 1.9 today: will resume coumadin       Normocytic anemia  -Hx of iron deficiency and acute blood loss. On chronic anticoagulation.   -Continue supplemental iron    Orthostatic hypotension  -Hx of orthostatic hypotension with falls and lightheadedness, for which his BP meds were reduced dating back to 7/13/2022. -Recent  event had been preceeded by standing up and walking to the bathroom then sudden fall when looking down.   - Monitoring BP   -Will get PT/OT evaluation    Type 2 diabetes mellitus without complication  -A1c 6.5 8 months ago. Keeping on ISS.   -Is on Januvia at home     ICD (implantable cardioverter-defibrillator) in place  -No ICD shocks reported.       CT Elizabeth  Heart Transplant  [facility] - Cardiology Stepdown

## 2023-05-20 NOTE — ASSESSMENT & PLAN NOTE
-HeartMate 3 Implanted 1/13/21 as DT  -Coumadin held on admit, New goal 1.5-2.0. therapeutic today. (previous INR goal was 2.0-2.5 but decreased this admission).  Home dose of Coumadin was 2.5mg Monday and 5mg Qdaily  -Antiplatelets Not on ASA given SDH  -LDH is stable overall today. Will continue to monitor daily.  -Speed set at 5200 rpm  -Interrogation notable for PI events  -Not listed for OHTx    Procedure: Device Interrogation Including analysis of device parameters  Current Settings: Ventricular Assist Device  Review of device function is stable    TXP LVAD INTERROGATIONS 5/20/2023 5/20/2023 5/20/2023 5/19/2023 5/19/2023 5/19/2023 5/19/2023   Type HeartMate3 HeartMate3 HeartMate3 HeartMate3 HeartMate3 HeartMate3 HeartMate3   Flow 4.4 3.9 4.2 4.7 4.4 4.5 4.7   Speed 5200 5200 5200 5200 5200 5200 5200   PI 5.8 8.2 6.4 4.9 4.6 4.6 4.6   Power (Edwards) 3.7 3.7 3.7 3.7 3.5 3.6 3.6   LSL 4800 4800 4800 4800 4800 4800 -   Pulsatility Pulse Pulse Pulse Pulse Pulse Pulse -

## 2023-05-20 NOTE — ASSESSMENT & PLAN NOTE
-hx of Chronic R subdural hematoma  -Neurosurgery consult  -Repeat CT unchanged from previous  -Unable to complete MRI to evaluate R weakness due to VAD  -PT/OT evaluation  -Will decrease INR goal to 1.5-2.0 going forward   -Will resume coumadin today  -INR was 3.2 on admit and is 1.9 today

## 2023-05-20 NOTE — SUBJECTIVE & OBJECTIVE
Medications Prior to Admission   Medication Sig Dispense Refill Last Dose    amLODIPine (NORVASC) 5 MG tablet Take 1 tablet (5 mg total) by mouth once daily. 30 tablet 5     atorvastatin (LIPITOR) 80 MG tablet Take 1 tablet by mouth once daily 90 tablet 0     digoxin (LANOXIN) 125 mcg tablet Take 1 tablet (0.125 mg total) by mouth once daily. 90 tablet 3     docusate sodium (COLACE) 100 MG capsule Take 100 mg by mouth Daily.       ferrous gluconate 324 mg (37.5 mg iron) Tab tablet Take 1 tablet (324 mg total) by mouth daily with breakfast. 30 tablet 11     furosemide (LASIX) 40 MG tablet Take 1 tablet (40 mg total) by mouth once daily at 6am. 30 tablet 11     lisinopriL (PRINIVIL,ZESTRIL) 5 MG tablet Take 1 tablet (5 mg total) by mouth once daily. 90 tablet 3     magnesium oxide (MAG-OX) 400 mg (241.3 mg magnesium) tablet Take 1 tablet (400 mg total) by mouth 3 (three) times daily. 90 tablet 11     mirtazapine (REMERON) 30 MG tablet Take 1 tablet (30 mg total) by mouth every evening. 30 tablet 11     SITagliptin (JANUVIA) 100 MG Tab Take 1 tablet (100 mg total) by mouth once daily. (Patient not taking: Reported on 4/5/2023) 90 tablet 0     spironolactone (ALDACTONE) 25 MG tablet Take 1 tablet (25 mg total) by mouth once daily. 30 tablet 11     warfarin (COUMADIN) 5 MG tablet Take 1 tablet (5 mg total) by mouth Daily. 35 tablet 5        Review of patient's allergies indicates:   Allergen Reactions    Pcn [penicillins] Hives       Past Medical History:   Diagnosis Date    Acute respiratory failure requiring reintubation 1/28/2022    CLARISSE (acute kidney injury) 1/11/2021    Diabetes mellitus     Kidney stones      Past Surgical History:   Procedure Laterality Date    CARDIAC CATHETERIZATION  2010    no stents    CARDIAC DEFIBRILLATOR PLACEMENT  2010    CARDIAC DEFIBRILLATOR PLACEMENT      HERNIA REPAIR      IRRIGATION OF MEDIASTINUM N/A 1/14/2021    Procedure: IRRIGATION, MEDIASTINUM;  Surgeon: Zenon Corona MD;   [address]  Service: Cardiovascular;  Laterality: N/A;    KNEE ARTHROSCOPY Right     LEFT VENTRICULAR ASSIST DEVICE Left 1/13/2021    Procedure: INSERTION- HeartMate 3 LEFT VENTRICULAR ASSIST DEVICE ;  Surgeon: Zenon Corona MD;  [facility];  Service: Cardiovascular;  Laterality: Left;    RECONSTRUCTION OF PERICARDIUM N/A 1/14/2021    Procedure: RECONSTRUCTION, PERICARDIUM;  Surgeon: Zenon Corona MD;  [facility];  Service: Cardiovascular;  Laterality: N/A;    RIGHT HEART CATHETERIZATION Right 11/2/2020    Procedure: INSERTION, CATHETER, RIGHT HEART;  Surgeon: Deana Lake MD;  Location: [facility] CATH LAB;  Service: Cardiology;  Laterality: Right;    RIGHT HEART CATHETERIZATION Right 3/24/2022    Procedure: INSERTION, CATHETER, RIGHT HEART;  Surgeon: Manuel Ramos Jr., MD;  Location: [facility] CATH LAB;  Service: Cardiology;  Laterality: Right;    STERNAL WOUND CLOSURE  1/13/2021    Procedure: TEMPORARY CLOSURE OF CHEST;  Surgeon: Zenon Corona MD;  [facility];  Service: Cardiovascular;;    STERNAL WOUND CLOSURE N/A 1/14/2021    Procedure: CLOSURE, WOUND, STERNUM;  Surgeon: Zenon Corona MD;  [facility];  Service: Cardiovascular;  Laterality: N/A;     Family History       Problem Relation (Age of Onset)    Heart attack Mother, Brother    Heart failure Brother    Hypertension Brother          Tobacco Use    Smoking status: Some Days     Types: Cigarettes    Smokeless tobacco: Never   Substance and Sexual Activity    Alcohol use: No    Drug use: Not on file    Sexual activity: Not on file     Review of Systems   Constitutional:  Negative for chills and fever.   HENT:  Negative for trouble swallowing and voice change.    Eyes:  Negative for photophobia and visual disturbance.   Respiratory:  Negative for chest tightness and shortness of breath.    Gastrointestinal:  Negative for nausea and vomiting.   Musculoskeletal:  Positive for  arthralgias and myalgias. Negative for back pain and neck pain.   Neurological:  Positive for weakness. Negative for facial asymmetry.   Objective:     Weight: 68.1 kg (150 lb 2.1 oz)  Body mass index is 19.28 kg/m².  Vital Signs (Most Recent):  Temp: 97.6 °F (36.4 °C) (05/20/23 1207)  Pulse: 100 (05/20/23 1207)  Resp: 16 (05/20/23 1207)  BP: (!) 88/59 (05/20/23 1144)  SpO2: 100 % (05/20/23 1207) Vital Signs (24h Range):  Temp:  [97.2 °F (36.2 °C)-98.5 °F (36.9 °C)] 97.6 °F (36.4 °C)  Pulse:  [] 100  Resp:  [16-19] 16  SpO2:  [98 %-100 %] 100 %  BP: ()/(0-74) 88/59     Date 05/20/23 0700 - 05/21/23 0659   Shift 5995-2453 7838-6415 2321-7237 24 Hour Total   INTAKE   Shift Total(mL/kg)       OUTPUT   Urine(mL/kg/hr) 240   240   Shift Total(mL/kg) 240(3.5)   240(3.5)   Weight (kg) 68.1 68.1 68.1 68.1                               Physical Exam         Neurosurgery Physical Exam  General: no distress  Head: Non-traumatic, normocephalic  Eyes: Pupils equal, EOMi  Neck: Supple, normal ROM, no tenderness to palpation  CVS: Normal rate and rhythm, distal pulses present  Pulm: Symmetric expansion, no respiratory distress  GI: Abdomen nondistended, nontender  MSK: Moves all extremities without restriction, atraumatic  Skin: Dry, intact  Psych: Normal thought content and cognition  Neuro: AOx3, GCS [id number]  CNII-XII: Intact on fine exam, PERRL, EOMi, facial sensation preserved, no facial asymmetry, tongue/uvula/palate midline, shoulder shrug equal, No pronator drift  Extremities:  Motor:  Upper Extremity:                                  Deltoids        Triceps        Biceps        WE        WF                Interosseous           R        3-/5              3/5              3/5            4/5         4/5         3/5                3/5           L        5/5              5/5              5/5            5/5         5/5         5/5                5/5  Lower Extremity:                                      HF         KE        KF        DF        PF        EHL           R       5/5        5/5        5/5        5/5        5/5        5/5           L       5/5        5/5        5/5        5/5        5/5        5/5    Reflexes:     DTR: 2+ and symmetrically throughout     Bolaños's: Negative      Sensory:      Sensorium intact throughout, no sensory level present    Coordination:      Coordination intact throughout    Cervical Spine:      ROM: Full with flexion, extension, lateral rotation and ear-to-shoulder bend.      Midline TTP: Negative         Significant Labs:  Recent Labs   Lab 05/18/23 1939 05/19/23 0342 05/20/23  0244   * 106  106 106  106    135*  135* 135*  135*   K 3.9 3.8  3.8 4.3  4.3    106  106 101  101   CO2 23 23 23 28  28   BUN 13 12  12 14  14   CREATININE 0.7 0.6  0.6 0.7  0.7   CALCIUM 9.1 8.3*  8.3* 9.0  9.0   MG 2.2 2.0 2.2       Recent Labs   Lab 05/18/23 1939 05/19/23 0342 05/20/23  0244   WBC 11.72 9.76 9.57   HGB 8.8* 8.5* 9.0*   HCT 29.6* 27.9* 30.1*    219 231       Recent Labs   Lab 05/18/23 1939 05/19/23 0342 05/20/23  0244   INR 3.2* 2.9* 1.9*       Microbiology Results (last 7 days)       ** No results found for the last 168 hours. **          All pertinent labs from the last 24 hours have been reviewed.    Significant Diagnostics:  I have reviewed and interpreted all pertinent imaging results/findings within the past 24 hours.    CT Head Without Contrast    Result Date: 5/18/2023  Similar appearing right hypodense extra-axial fluid collection.  No significant mass effect midline shift.  No new hemorrhage. Electronically signed by resident: Albaro Guadarrama Date:    05/18/2023 Time:    22:09 Electronically signed by: Bertin Cadet MD Date:    05/18/2023 Time:    22:29

## 2023-05-20 NOTE — ASSESSMENT & PLAN NOTE
-INR 3.2 on admit  -Coumadin held  -INR goal changed to 1.5-2.0  -INR 1.9 today: will resume coumadin

## 2023-05-20 NOTE — PLAN OF CARE
Pt aox4 reports pain 7-9/10 throughout shift.  Pain medication administered when assessing pain  Pt reports mild relief  Pt states he is ready to perform LVAD dressing change  No alarms during shift  All pt needs are met in no apparent distress.       Problem: Adult Inpatient Plan of Care  Goal: Plan of Care Review  Outcome: Ongoing, Progressing  Goal: Patient-Specific Goal (Individualized)  Outcome: Ongoing, Progressing  Goal: Absence of Hospital-Acquired Illness or Injury  Outcome: Ongoing, Progressing  Goal: Optimal Comfort and Wellbeing  Outcome: Ongoing, Progressing  Goal: Readiness for Transition of Care  Outcome: Ongoing, Progressing     Problem: Diabetes Comorbidity  Goal: Blood Glucose Level Within Targeted Range  Outcome: Ongoing, Progressing     Problem: Fall Injury Risk  Goal: Absence of Fall and Fall-Related Injury  Outcome: Ongoing, Progressing

## 2023-05-20 NOTE — PT/OT/SLP EVAL
Physical Therapy Evaluation and Treatment     Patient Name:  Rocco France  MRN: [id number]    Admit Date: 5/18/2023  Admitting Diagnosis:  Subdural hematoma, nontraumatic  Length of Stay: 2 days  Recent Surgery: * No surgery found *      Recommendations:     Discharge Recommendations: other (see comments)   Equipment recommendations: rolling walker  Barriers to discharge: None Identified     Assessment:     Rocco France is a 67 y.o. male admitted to [facility] on 5/18/2023 with medical diagnosis of chronic Subdural hematoma. Pt with h/o LVAD implanted in January of 2021 and reported 2 recent falls after picking up object off floor. Pt presents with RUE weakness, impaired endurance, impaired self care skills, impaired functional mobility, gait instability, impaired balance, decreased upper extremity function, impaired cardiopulmonary response to activity. These deficits effect their roles and responsibilities in which they were able to complete prior to admit. Rocco France would benefit from acute PT intervention to improve quality of life, focus on recovery of impairments, provide patient/caregiver education, reduce fall risk, and maximize (I) and safety with functional mobility. Pt would benefit from follow-up with PT upon discharge to maximize recovery of impairments and independence.     Rehab Prognosis: Good    Plan:     During this hospitalization, patient to be seen 3 x/week to address the identified rehab impairments via gait training, therapeutic activities, therapeutic exercises, neuromuscular re-education and progress towards stated goals.     Plan of Care Expires:  06/19/23  Plan of Care reviewed with: patient    This plan of care has been discussed with the patient/caregiver, who was included in its development and is in agreement with the identified goals and treatment plan.     Subjective     Communicated with RN prior to session.  Patient found up in chair upon PT entry to room, agreeable to  evaluation.     Chief Complaint: No chief complaint on file.      Patient/Family Comments/goals: pt reported that he had 2 recent falls after picking up an object from the ground. Pt stated he has noticed RUE weakness and tremors.     Pain/Comfort:  Pain Rating 1: 0/10  Pain Rating Post-Intervention 1: 0/10    Patients cultural, spiritual, Muslim conflicts given the current situation: None identified     Patient History: information obtained from pt      Living Environment: Pt lives with spouse in single level home  with 3 MARCIO. Bathroom set-up: tub/shower combo  Prior Level of Function: independent with mobility and ADLs, occasionally using SPC for mobility as needed.2 recent falls.   DME owned: single point cane and shower chair  Support Available/Caregiver Assistance:  spouse    Objective:     Patient found with: telemetry, LVAD. LVAD remained to wall power during session.     Recent Surgery: * No surgery found *    General Precautions: Standard, fall   Orthopedic Precautions:N/A   Braces: N/A   Oxygen Device: room air      Exams:    Cognition:  AOx4 and Cooperative  Command following: Follows multistep verbal commands  Communication: clear/fluent    Sensation:   Light touch sensation: Intact BLEs    Gross Motor Coordination: No deficits noted during functional mobility tasks     Edema/Skin Integrity: None noted; Visible skin intact    Postural examination/scapula alignment: Rounded shoulder and Head forward    Lower Extremity Range of Motion:  Right Lower Extremity: WFL  Left Lower Extremity: WFL    Lower Extremity Strength:  Right Lower Extremity:  5/5 except knee flexion 4-/5   Left Lower Extremity: 5/5    Upper Extremity Strength: Pt with grossly decreased R  and UE strength upon assessment; intention tremors noted while pt demonstrating functional ability to bring hand to mouth for feeding. Pt educated on benefits of using utensil with coband wrapping to improve  and coordination during  self-feeding. Pt would benefit from further UE evaluation and treatment with OT.     Functional Mobility:    Bed Mobility:  N/T 2/2 pt sitting up in chair upon PT arrival     Transfers:   Sit <> Stand Transfer: Stand-by Assistance x 1 trials from bedside chair with no AD, and x 1 trial using RW                 Gait:  Distance: ~20 ft.   Assistance level: Stand-by Assistance  Assistive Device: rolling walker  Gait Assessment: decreased step length , decreased gait speed, narrow base of support, and unsteady gait . No overt LOB.      Balance:  Standing:  Static: FAIR: Maintains without assist but unable to take challenges   Dynamic: FAIR+: Needs CLOSE SUPERVISION during gait and is able to right self with minor LOB    Outcome Measure: AM-PAC 6 CLICK MOBILITY  Total Score:19     Patient/Caregiver Education:     Therapist educated pt/caregiver regarding:   PT POC and goals for therapy   Safety with mobility and fall risk   Instruction on use of call button and importance of calling nursing staff for assistance with mobility   Time provided for therapeutic counseling and discussion of current health disposition. All questions answered to satisfaction, within scope of PT practice     Patient/caregiver able to verbalize understanding and expressed no further questions this visit; will follow-up with pt/caregiver during current admit for additional questions/concerns within scope of practice.     White board updated.     Patient left up in chair with all lines intact, call button in reach, RN notified, and spouse present.    GOALS:   Multidisciplinary Problems       Physical Therapy Goals          Problem: Physical Therapy    Goal Priority Disciplines Outcome Goal Variances Interventions   Physical Therapy Goal     PT, PT/OT Ongoing, Progressing     Description: Goals to be met by: 23     Patient will increase functional independence with mobility by performin. Supine <> sit with [place]  2. Sit to stand  transfer with Modified [place]  3. Gait  x 150 feet with Supervision using LRAD as needed, without LOB   4. Ascend/descend 3 stairs with right Handrails Supervision     5. Lower extremity exercise program x20 reps per handout, with supervision                           History:     Past Medical History:   Diagnosis Date    Acute respiratory failure requiring reintubation 1/28/2022    CLARISSE (acute kidney injury) 1/11/2021    Diabetes mellitus     Kidney stones        Past Surgical History:   Procedure Laterality Date    CARDIAC CATHETERIZATION  2010    no stents    CARDIAC DEFIBRILLATOR PLACEMENT  2010    CARDIAC DEFIBRILLATOR PLACEMENT      HERNIA REPAIR      IRRIGATION OF MEDIASTINUM N/A 1/14/2021    Procedure: IRRIGATION, MEDIASTINUM;  Surgeon: Zenon Corona MD;  Location: [facility] OR [address] [place] [address];  Service: Cardiovascular;  Laterality: N/A;    KNEE ARTHROSCOPY Right     LEFT VENTRICULAR ASSIST DEVICE Left 1/13/2021    Procedure: INSERTION- HeartMate 3 LEFT VENTRICULAR ASSIST DEVICE ;  Surgeon: Zenon Corona MD;  Location: [address];  Service: Cardiovascular;  Laterality: Left;    RECONSTRUCTION OF PERICARDIUM N/A 1/14/2021    Procedure: RECONSTRUCTION, PERICARDIUM;  Surgeon: Zenon Corona MD;  [facility];  Service: Cardiovascular;  Laterality: N/A;    RIGHT HEART CATHETERIZATION Right 11/2/2020    Procedure: INSERTION, CATHETER, RIGHT HEART;  Surgeon: Deana Lake MD;  Location: [facility] CATH LAB;  Service: Cardiology;  Laterality: Right;    RIGHT HEART CATHETERIZATION Right 3/24/2022    Procedure: INSERTION, CATHETER, RIGHT HEART;  Surgeon: Manuel Ramos Jr., MD;  Location: [facility] CATH LAB;  Service: Cardiology;  Laterality: Right;    STERNAL WOUND CLOSURE  1/13/2021    Procedure: TEMPORARY CLOSURE OF CHEST;  Surgeon: Zenon Corona MD;  Location: [address];  Service: Cardiovascular;;    STERNAL WOUND CLOSURE N/A 1/14/2021    Procedure: CLOSURE, WOUND, STERNUM;  Surgeon: Zenon Corona  MD;  Location: [facility] OR [address] [place] [address];  Service: Cardiovascular;  Laterality: N/A;       Time Tracking:     PT Received On: 05/20/23  PT Start Time: 0935     PT Stop Time: 1000  PT Total Time (min): 25 min     Billable Minutes: Evaluation 17 min and Gait Training 8 min    05/20/2023

## 2023-05-20 NOTE — PROGRESS NOTES
[facility] - Cardiology Stepdown  Neurosurgery  Progress Note    Subjective:     History of Present Illness: 67M with non-ischemic cardiomyopathy status post DT HM3 implantation 1/13/2021 transferred for evaluation of known R frontal chronic SDH.  Last INR on records 3.4. Mr. France says on Saturday 05/13 around 5am he stood up used the bathroom and went to his chair. He went to grab something on the floor when all of sudden his legs gave out, feels weak in R forearm/hand. No neck pain.  States he went to sleep until 9am when he stood up to the bathroom and as he was walking back to the bed he look down and same episode happen. His legs gave out and he fell to the floor. He then called his neighbor to help him stand up.  He denied any presyncopal symptoms, confusion, diarrhea or any changes that could have precipitated his symptoms. He has home health who monitor his BP but he does not remember his usual numbers. His wife also noticed a new R. Hand tremor.      Patient had been admitted in the past on 1/27/22 after a syncopal event and was found to have an evolving R acute subdural hematoma plus acute basal cistern subarachnoid hemorrhage. At that time patient was intubated and had a prolonged hospitalization complicated by COVID.    CTH on arrival to [facility] stable compared to prior imaging from March 2023, no new acute hemorrhage.      Warfarin daily, INR 3.4      Post-Op Info:  * No surgery found *         Medications Prior to Admission   Medication Sig Dispense Refill Last Dose    amLODIPine (NORVASC) 5 MG tablet Take 1 tablet (5 mg total) by mouth once daily. 30 tablet 5     atorvastatin (LIPITOR) 80 MG tablet Take 1 tablet by mouth once daily 90 tablet 0     digoxin (LANOXIN) 125 mcg tablet Take 1 tablet (0.125 mg total) by mouth once daily. 90 tablet 3     docusate sodium (COLACE) 100 MG capsule Take 100 mg by mouth Daily.       ferrous gluconate 324 mg (37.5 mg iron) Tab tablet Take 1 tablet (324 mg total) by  mouth daily with breakfast. 30 tablet 11     furosemide (LASIX) 40 MG tablet Take 1 tablet (40 mg total) by mouth once daily at 6am. 30 tablet 11     lisinopriL (PRINIVIL,ZESTRIL) 5 MG tablet Take 1 tablet (5 mg total) by mouth once daily. 90 tablet 3     magnesium oxide (MAG-OX) 400 mg (241.3 mg magnesium) tablet Take 1 tablet (400 mg total) by mouth 3 (three) times daily. 90 tablet 11     mirtazapine (REMERON) 30 MG tablet Take 1 tablet (30 mg total) by mouth every evening. 30 tablet 11     SITagliptin (JANUVIA) 100 MG Tab Take 1 tablet (100 mg total) by mouth once daily. (Patient not taking: Reported on 4/5/2023) 90 tablet 0     spironolactone (ALDACTONE) 25 MG tablet Take 1 tablet (25 mg total) by mouth once daily. 30 tablet 11     warfarin (COUMADIN) 5 MG tablet Take 1 tablet (5 mg total) by mouth Daily. 35 tablet 5        Review of patient's allergies indicates:   Allergen Reactions    Pcn [penicillins] Hives       Past Medical History:   Diagnosis Date    Acute respiratory failure requiring reintubation 1/28/2022    CLARISSE (acute kidney injury) 1/11/2021    Diabetes mellitus     Kidney stones      Past Surgical History:   Procedure Laterality Date    CARDIAC CATHETERIZATION  2010    no stents    CARDIAC DEFIBRILLATOR PLACEMENT  2010    CARDIAC DEFIBRILLATOR PLACEMENT      HERNIA REPAIR      IRRIGATION OF MEDIASTINUM N/A 1/14/2021    Procedure: IRRIGATION, MEDIASTINUM;  Surgeon: Zenon Corona MD;  Location: [facility] OR [address] [place] [address];  Service: Cardiovascular;  Laterality: N/A;    KNEE ARTHROSCOPY Right     LEFT VENTRICULAR ASSIST DEVICE Left 1/13/2021    Procedure: INSERTION- HeartMate 3 LEFT VENTRICULAR ASSIST DEVICE ;  Surgeon: Zenon Corona MD;  Location: [facility] OR [address] [place] [address];  Service: Cardiovascular;  Laterality: Left;    RECONSTRUCTION OF PERICARDIUM N/A 1/14/2021    Procedure: RECONSTRUCTION, PERICARDIUM;  Surgeon: Zenon Corona MD;  Location: [facility] OR [address] [place] [address];  Service: Cardiovascular;  Laterality:  N/A;    RIGHT HEART CATHETERIZATION Right 11/2/2020    Procedure: INSERTION, CATHETER, RIGHT HEART;  Surgeon: Deana Lake MD;  Location: [facility] CATH LAB;  Service: Cardiology;  Laterality: Right;    RIGHT HEART CATHETERIZATION Right 3/24/2022    Procedure: INSERTION, CATHETER, RIGHT HEART;  Surgeon: Manuel Ramos Jr., MD;  Location: [facility] CATH LAB;  Service: Cardiology;  Laterality: Right;    STERNAL WOUND CLOSURE  1/13/2021    Procedure: TEMPORARY CLOSURE OF CHEST;  Surgeon: Zenon Corona MD;  Location: [facility] OR [facility] FLR;  Service: Cardiovascular;;    STERNAL WOUND CLOSURE N/A 1/14/2021    Procedure: CLOSURE, WOUND, STERNUM;  Surgeon: Zenon Corona MD;  Location: [facility] OR [facility] FLR;  Service: Cardiovascular;  Laterality: N/A;     Family History       Problem Relation (Age of Onset)    Heart attack Mother, Brother    Heart failure Brother    Hypertension Brother          Tobacco Use    Smoking status: Some Days     Types: Cigarettes    Smokeless tobacco: Never   Substance and Sexual Activity    Alcohol use: No    Drug use: Not on file    Sexual activity: Not on file     Review of Systems   Constitutional:  Negative for chills and fever.   HENT:  Negative for trouble swallowing and voice change.    Eyes:  Negative for photophobia and visual disturbance.   Respiratory:  Negative for chest tightness and shortness of breath.    Gastrointestinal:  Negative for nausea and vomiting.   Musculoskeletal:  Positive for arthralgias and myalgias. Negative for back pain and neck pain.   Neurological:  Positive for weakness. Negative for facial asymmetry.   Objective:     Weight: 68.1 kg (150 lb 2.1 oz)  Body mass index is 19.28 kg/m².  Vital Signs (Most Recent):  Temp: 97.6 °F (36.4 °C) (05/20/23 1207)  Pulse: 100 (05/20/23 1207)  Resp: 16 (05/20/23 1207)  BP: (!) 88/59 (05/20/23 1144)  SpO2: 100 % (05/20/23 1207) Vital Signs (24h Range):  Temp:  [97.2 °F (36.2 °C)-98.5 °F (36.9 °C)] 97.6 °F (36.4 °C)  Pulse:   [] 100  Resp:  [16-19] 16  SpO2:  [98 %-100 %] 100 %  BP: ()/(0-74) 88/59     Date 05/20/23 0700 - 05/21/23 0659   Shift 6164-2846 8278-1945 5382-1519 24 Hour Total   INTAKE   Shift Total(mL/kg)       OUTPUT   Urine(mL/kg/hr) 240   240   Shift Total(mL/kg) 240(3.5)   240(3.5)   Weight (kg) 68.1 68.1 68.1 68.1                               Physical Exam         Neurosurgery Physical Exam  General: no distress  Head: Non-traumatic, normocephalic  Eyes: Pupils equal, EOMi  Neck: Supple, normal ROM, no tenderness to palpation  CVS: Normal rate and rhythm, distal pulses present  Pulm: Symmetric expansion, no respiratory distress  GI: Abdomen nondistended, nontender  MSK: Moves all extremities without restriction, atraumatic  Skin: Dry, intact  Psych: Normal thought content and cognition  Neuro: AOx3, GCS [id number]  CNII-XII: Intact on fine exam, PERRL, EOMi, facial sensation preserved, no facial asymmetry, tongue/uvula/palate midline, shoulder shrug equal, No pronator drift  Extremities:  Motor:  Upper Extremity:                                  Deltoids        Triceps        Biceps        WE        WF                Interosseous           R        3-/5              3/5              3/5            4/5         4/5         3/5                3/5           L        5/5              5/5              5/5            5/5         5/5         5/5                5/5  Lower Extremity:                                      HF        KE        KF        DF        PF        EHL           R       5/5        5/5        5/5        5/5        5/5        5/5           L       5/5        5/5        5/5        5/5        5/5        5/5    Reflexes:     DTR: 2+ and symmetrically throughout     Bolaños's: Negative      Sensory:      Sensorium intact throughout, no sensory level present    Coordination:      Coordination intact throughout    Cervical Spine:      ROM: Full with flexion, extension, lateral rotation and ear-to-shoulder  bend.      Midline TTP: Negative         Significant Labs:  Recent Labs   Lab 05/18/23 1939 05/19/23 0342 05/20/23  0244   * 106  106 106  106    135*  135* 135*  135*   K 3.9 3.8  3.8 4.3  4.3    106  106 101  101   CO2 23 23 23 28  28   BUN 13 12  12 14  14   CREATININE 0.7 0.6  0.6 0.7  0.7   CALCIUM 9.1 8.3*  8.3* 9.0  9.0   MG 2.2 2.0 2.2       Recent Labs   Lab 05/18/23 1939 05/19/23 0342 05/20/23  0244   WBC 11.72 9.76 9.57   HGB 8.8* 8.5* 9.0*   HCT 29.6* 27.9* 30.1*    219 231       Recent Labs   Lab 05/18/23 1939 05/19/23 0342 05/20/23  0244   INR 3.2* 2.9* 1.9*       Microbiology Results (last 7 days)       ** No results found for the last 168 hours. **          All pertinent labs from the last 24 hours have been reviewed.    Significant Diagnostics:  I have reviewed and interpreted all pertinent imaging results/findings within the past 24 hours.    CT Head Without Contrast    Result Date: 5/18/2023  Similar appearing right hypodense extra-axial fluid collection.  No significant mass effect midline shift.  No new hemorrhage. Electronically signed by resident: Albaro Guadarrama Date:    05/18/2023 Time:    22:09 Electronically signed by: Bertin Cadet MD Date:    05/18/2023 Time:    22:29       Assessment/Plan:     * Subdural hematoma, nontraumatic  67M with h/o heart failure s/p LVAD placement on Warfarin with known R frontal SDH, stable on interval imaging. Prior to arrival patient states he had recent fall about a week ago with 2 episodes of his legs giving out. INR 3.4.    --No acute neurosurgical intervention. CTH stable compared to March imaging, no new hemorrhage  --Risk/benefit of AC needs to bed discussed with patient given recent falls and high risk for intracranial hemorrhage given therapeutic INR  -- No further imaging recommended  --Medical management/work up per primary  -- Neurosurgery will signoff, no further recommendations. Will schedule follow  up outpatient    D/w Dr. Timbo Ann MD  Neurosurgery  [facility] - Cardiology Stepdown

## 2023-05-20 NOTE — PLAN OF CARE
Plan of Care:  PT evaluation completed- see note for details. Goals and POC established.     2023    Physical Therapy Treatment Goals:  Multidisciplinary Problems       Physical Therapy Goals          Problem: Physical Therapy    Goal Priority Disciplines Outcome Goal Variances Interventions   Physical Therapy Goal     PT, PT/OT Ongoing, Progressing     Description: Goals to be met by: 23     Patient will increase functional independence with mobility by performin. Supine <> sit with Wann  2. Sit to stand transfer with Modified Wann  3. Gait  x 150 feet with Supervision using LRAD as needed, without LOB   4. Ascend/descend 3 stairs with right Handrails Supervision     5. Lower extremity exercise program x20 reps per handout, with supervision

## 2023-05-20 NOTE — ASSESSMENT & PLAN NOTE
67M with h/o heart failure s/p LVAD placement on Warfarin with known R frontal SDH, stable on interval imaging. Prior to arrival patient states he had recent fall about a week ago with 2 episodes of his legs giving out. INR 3.4.    --No acute neurosurgical intervention. CTH stable compared to March imaging, no new hemorrhage  --Risk/benefit of AC needs to bed discussed with patient given recent falls and high risk for intracranial hemorrhage given therapeutic INR  -- No further imaging recommended  --Medical management/work up per primary  -- Neurosurgery will signoff, no further recommendations. Will schedule follow up outpatient    D/w Dr. Izquierdo

## 2023-05-20 NOTE — SUBJECTIVE & OBJECTIVE
Interval History: CT of cervical spine done and consistent with multilevel degenerative changes.  Mod-sever spinal canal stenosis C2-C5.  Moderate neural foraminal narrowing C3-C4.  CT of head without contrast ordered.  He still complains of RUE weakness.  PT/OT consults in and awaiting evaluation.  Echo ordered.  INR goal changed to 1.5-2.0. Coumadin was held on admit in the setting of supratherapeutic INR.   INR 1.9 today so will resume      Continuous Infusions:  Scheduled Meds:   amLODIPine  5 mg Oral Daily    atorvastatin  80 mg Oral Daily    digoxin  0.125 mg Oral Daily    docusate sodium  100 mg Oral Daily    ferrous gluconate  324 mg Oral Daily with breakfast    furosemide  40 mg Oral Daily    lisinopriL  5 mg Oral Daily    magnesium oxide  400 mg Oral TID    mirtazapine  30 mg Oral QHS    polyethylene glycol  17 g Oral Daily    SITagliptin phosphate  100 mg Oral Daily    spironolactone  25 mg Oral Daily    [START ON 5/22/2023] warfarin  2.5 mg Oral Every Mon, Wed, Fri    warfarin  5 mg Oral Every Tues, Thurs, Sat, Sun     PRN Meds:acetaminophen, dextrose 10%, dextrose 10%, dextrose, dextrose, glucagon (human recombinant), insulin aspart U-100, ondansetron, oxyCODONE, sodium chloride 0.9%    Review of patient's allergies indicates:   Allergen Reactions    Pcn [penicillins] Hives     Objective:     Vital Signs (Most Recent):  Temp: 97.8 °F (36.6 °C) (05/20/23 0752)  Pulse: 86 (05/20/23 0752)  Resp: 16 (05/20/23 0752)  BP: 117/65 (05/20/23 0752)  SpO2: 100 % (05/20/23 0752) Vital Signs (24h Range):  Temp:  [97.2 °F (36.2 °C)-98.5 °F (36.9 °C)] 97.8 °F (36.6 °C)  Pulse:  [78-87] 86  Resp:  [13-23] 16  SpO2:  [98 %-100 %] 100 %  BP: ()/(0-76) 117/65     Patient Vitals for the past 72 hrs (Last 3 readings):   Weight   05/20/23 0752 68.1 kg (150 lb 2.1 oz)   05/19/23 0600 67.6 kg (149 lb 0.5 oz)       Body mass index is 19.28 kg/m².      Intake/Output Summary (Last 24 hours) at 5/20/2023 0998  Last data  filed at 5/20/2023 0513  Gross per 24 hour   Intake 880 ml   Output 1075 ml   Net -195 ml         Hemodynamic Parameters:       Telemetry: reviewed     Physical Exam   Constitutional:       General: He is not in acute distress.     Appearance: Normal appearance. He is normal weight. He is not ill-appearing, toxic-appearing or diaphoretic.   HENT:      Head: Normocephalic and atraumatic.      Nose: Nose normal. No congestion.      Mouth/Throat:      Mouth: Mucous membranes are dry.      Pharynx: Oropharynx is clear.   Eyes:      Extraocular Movements: Extraocular movements intact.      Conjunctiva/sclera: Conjunctivae normal.      Pupils: Pupils are equal, round, and reactive to light.   Cardiovascular:      Rate and Rhythm: Normal rate and regular rhythm.      Pulses: Normal pulses.      Comments: Lvad hum  Pulmonary:      Effort: Pulmonary effort is normal. No respiratory distress.      Breath sounds: Normal breath sounds. No wheezing or rhonchi.   Abdominal:      General: Abdomen is flat. Bowel sounds are normal.      Palpations: Abdomen is soft.   Musculoskeletal:         General: No swelling or tenderness. Normal range of motion.      Cervical back: Normal range of motion and neck supple. No rigidity or tenderness.   Skin:     General: Skin is warm and dry.      Capillary Refill: Capillary refill takes less than 2 seconds.   Neurological:      Mental Status: He is alert and oriented to person, place, and time.      Motor: Weakness present.      Comments: R arm weakness   Psychiatric:         Mood and Affect: Mood normal.         Thought Content: Thought content normal.     Significant Labs:  CBC:  Recent Labs   Lab 05/18/23  1939 05/19/23  0342 05/20/23  0244   WBC 11.72 9.76 9.57   RBC 3.25* 3.09* 3.36*   HGB 8.8* 8.5* 9.0*   HCT 29.6* 27.9* 30.1*    219 231   MCV 91 90 90   MCH 27.1 27.5 26.8*   MCHC 29.7* 30.5* 29.9*       BNP:  No results for input(s): BNP in the last 168 hours.    Invalid input(s):  BNPTRIAGELBLO  CMP:  Recent Labs   Lab 05/18/23 1939 05/19/23 0342 05/20/23  0244   * 106  106 106  106   CALCIUM 9.1 8.3*  8.3* 9.0  9.0   ALBUMIN 2.4* 2.0* 2.2*   PROT 9.6* 8.3 9.1*    135*  135* 135*  135*   K 3.9 3.8  3.8 4.3  4.3   CO2 23 23 23 28  28    106  106 101  101   BUN 13 12  12 14  14   CREATININE 0.7 0.6  0.6 0.7  0.7   ALKPHOS 105 95 100   ALT 12 10 8*   AST 18 16 13   BILITOT 0.8 0.6 0.8        Coagulation:   Recent Labs   Lab 05/18/23 1939 05/19/23 0342 05/20/23  0244   INR 3.2* 2.9* 1.9*       LDH:  Recent Labs   Lab 05/18/23 2027          Microbiology:  Microbiology Results (last 7 days)       ** No results found for the last 168 hours. **            I have reviewed all pertinent labs within the past 24 hours.    Estimated Creatinine Clearance: 98.6 mL/min (based on SCr of 0.7 mg/dL).    Diagnostic Results:  I have reviewed all pertinent imaging results/findings within the past 24 hours.

## 2023-05-20 NOTE — ASSESSMENT & PLAN NOTE
- CT of cervical spine 5/19 consistent with multilevel degenerative changes.  Mod-sever spinal canal stenosis C2-C5.  Moderate neural foraminal narrowing C3-C4.  CT of head without contrast ordered.   -Unable to complete MRI due to VAD  -PT/OT consulted for evaluation and recommendations

## 2023-05-20 NOTE — PROGRESS NOTES
05/20/2023  Claritza Miguel    Current provider:  Mike Chauhan MD    Device interrogation:  TXP LVAD INTERROGATIONS 5/20/2023 5/20/2023 5/20/2023 5/19/2023 5/19/2023 5/19/2023 5/19/2023   Type HeartMate3 HeartMate3 HeartMate3 HeartMate3 HeartMate3 HeartMate3 HeartMate3   Flow 4.4 3.9 4.2 4.7 4.4 4.5 4.7   Speed 5200 5200 5200 5200 5200 5200 5200   PI 5.8 8.2 6.4 4.9 4.6 4.6 4.6   Power (Edwards) 3.7 3.7 3.7 3.7 3.5 3.6 3.6   LSL 4800 4800 4800 4800 4800 4800 -   Pulsatility Pulse Pulse Pulse Pulse Pulse Pulse -          Rounded on Rocco France to ensure all mechanical assist device settings (IABP or VAD) were appropriate and all parameters were within limits.  I was able to ensure all back up equipment was present, the staff had no issues, and the Perfusion Department daily rounding was complete.      For implantable VADs: Interrogation of Ventricular assist device was performed with analysis of device parameters and review of device function. I have personally reviewed the interrogation findings and agree with findings as stated.     In emergency, the nursing units have been notified to contact the perfusion department either by:  Calling [phone] from 630am to 4pm Mon thru Fri, utilizing the On-Call Finder functionality of Epic and searching for Perfusion, or by contacting the hospital  from 4pm to 630am and on weekends and asking to speak with the perfusionist on call.    10:13 AM

## 2023-05-21 LAB
ALBUMIN SERPL BCP-MCNC: 2.3 G/DL (ref 3.5–5.2)
ALP SERPL-CCNC: 124 U/L (ref 55–135)
ALT SERPL W/O P-5'-P-CCNC: 8 U/L (ref 10–44)
ANION GAP SERPL CALC-SCNC: 9 MMOL/L (ref 8–16)
ANION GAP SERPL CALC-SCNC: 9 MMOL/L (ref 8–16)
AST SERPL-CCNC: 12 U/L (ref 10–40)
BASOPHILS # BLD AUTO: 0.04 K/UL (ref 0–0.2)
BASOPHILS NFR BLD: 0.3 % (ref 0–1.9)
BILIRUB SERPL-MCNC: 0.9 MG/DL (ref 0.1–1)
BUN SERPL-MCNC: 15 MG/DL (ref 8–23)
BUN SERPL-MCNC: 15 MG/DL (ref 8–23)
CALCIUM SERPL-MCNC: 9.6 MG/DL (ref 8.7–10.5)
CALCIUM SERPL-MCNC: 9.6 MG/DL (ref 8.7–10.5)
CHLORIDE SERPL-SCNC: 99 MMOL/L (ref 95–110)
CHLORIDE SERPL-SCNC: 99 MMOL/L (ref 95–110)
CO2 SERPL-SCNC: 25 MMOL/L (ref 23–29)
CO2 SERPL-SCNC: 25 MMOL/L (ref 23–29)
CREAT SERPL-MCNC: 0.9 MG/DL (ref 0.5–1.4)
CREAT SERPL-MCNC: 0.9 MG/DL (ref 0.5–1.4)
DIFFERENTIAL METHOD: ABNORMAL
EOSINOPHIL # BLD AUTO: 0.2 K/UL (ref 0–0.5)
EOSINOPHIL NFR BLD: 2 % (ref 0–8)
ERYTHROCYTE [DISTWIDTH] IN BLOOD BY AUTOMATED COUNT: 15.4 % (ref 11.5–14.5)
EST. GFR  (NO RACE VARIABLE): >60 ML/MIN/1.73 M^2
EST. GFR  (NO RACE VARIABLE): >60 ML/MIN/1.73 M^2
GLUCOSE SERPL-MCNC: 134 MG/DL (ref 70–110)
GLUCOSE SERPL-MCNC: 134 MG/DL (ref 70–110)
HCT VFR BLD AUTO: 31 % (ref 40–54)
HGB BLD-MCNC: 9.2 G/DL (ref 14–18)
IMM GRANULOCYTES # BLD AUTO: 0.04 K/UL (ref 0–0.04)
IMM GRANULOCYTES NFR BLD AUTO: 0.3 % (ref 0–0.5)
INR PPP: 1.5 (ref 0.8–1.2)
LYMPHOCYTES # BLD AUTO: 2.2 K/UL (ref 1–4.8)
LYMPHOCYTES NFR BLD: 18.2 % (ref 18–48)
MAGNESIUM SERPL-MCNC: 2.3 MG/DL (ref 1.6–2.6)
MCH RBC QN AUTO: 27.1 PG (ref 27–31)
MCHC RBC AUTO-ENTMCNC: 29.7 G/DL (ref 32–36)
MCV RBC AUTO: 91 FL (ref 82–98)
MONOCYTES # BLD AUTO: 1 K/UL (ref 0.3–1)
MONOCYTES NFR BLD: 8.3 % (ref 4–15)
NEUTROPHILS # BLD AUTO: 8.4 K/UL (ref 1.8–7.7)
NEUTROPHILS NFR BLD: 70.9 % (ref 38–73)
NRBC BLD-RTO: 0 /100 WBC
PHOSPHATE SERPL-MCNC: 2.9 MG/DL (ref 2.7–4.5)
PLATELET # BLD AUTO: 299 K/UL (ref 150–450)
PMV BLD AUTO: 9.9 FL (ref 9.2–12.9)
POCT GLUCOSE: 105 MG/DL (ref 70–110)
POCT GLUCOSE: 112 MG/DL (ref 70–110)
POCT GLUCOSE: 123 MG/DL (ref 70–110)
POCT GLUCOSE: 133 MG/DL (ref 70–110)
POCT GLUCOSE: 175 MG/DL (ref 70–110)
POTASSIUM SERPL-SCNC: 3.9 MMOL/L (ref 3.5–5.1)
POTASSIUM SERPL-SCNC: 3.9 MMOL/L (ref 3.5–5.1)
PROT SERPL-MCNC: 9.7 G/DL (ref 6–8.4)
PROTHROMBIN TIME: 15.6 SEC (ref 9–12.5)
RBC # BLD AUTO: 3.39 M/UL (ref 4.6–6.2)
SODIUM SERPL-SCNC: 133 MMOL/L (ref 136–145)
SODIUM SERPL-SCNC: 133 MMOL/L (ref 136–145)
WBC # BLD AUTO: 11.84 K/UL (ref 3.9–12.7)

## 2023-05-21 PROCEDURE — 93750 INTERROGATION VAD IN PERSON: CPT | Mod: ,,, | Performed by: INTERNAL MEDICINE

## 2023-05-21 PROCEDURE — 36415 COLL VENOUS BLD VENIPUNCTURE: CPT | Performed by: STUDENT IN AN ORGANIZED HEALTH CARE EDUCATION/TRAINING PROGRAM

## 2023-05-21 PROCEDURE — 25000003 PHARM REV CODE 250: Performed by: INTERNAL MEDICINE

## 2023-05-21 PROCEDURE — 85025 COMPLETE CBC W/AUTO DIFF WBC: CPT | Performed by: STUDENT IN AN ORGANIZED HEALTH CARE EDUCATION/TRAINING PROGRAM

## 2023-05-21 PROCEDURE — 20600001 HC STEP DOWN PRIVATE ROOM

## 2023-05-21 PROCEDURE — 99233 PR SUBSEQUENT HOSPITAL CARE,LEVL III: ICD-10-PCS | Mod: ,,, | Performed by: PHYSICIAN ASSISTANT

## 2023-05-21 PROCEDURE — 27000248 HC VAD-ADDITIONAL DAY

## 2023-05-21 PROCEDURE — 83735 ASSAY OF MAGNESIUM: CPT | Performed by: STUDENT IN AN ORGANIZED HEALTH CARE EDUCATION/TRAINING PROGRAM

## 2023-05-21 PROCEDURE — 97110 THERAPEUTIC EXERCISES: CPT

## 2023-05-21 PROCEDURE — 97530 THERAPEUTIC ACTIVITIES: CPT

## 2023-05-21 PROCEDURE — 80053 COMPREHEN METABOLIC PANEL: CPT | Performed by: STUDENT IN AN ORGANIZED HEALTH CARE EDUCATION/TRAINING PROGRAM

## 2023-05-21 PROCEDURE — 84100 ASSAY OF PHOSPHORUS: CPT | Performed by: STUDENT IN AN ORGANIZED HEALTH CARE EDUCATION/TRAINING PROGRAM

## 2023-05-21 PROCEDURE — 99233 SBSQ HOSP IP/OBS HIGH 50: CPT | Mod: ,,, | Performed by: PHYSICIAN ASSISTANT

## 2023-05-21 PROCEDURE — 93750 PR INTERROGATE VENT ASSIST DEV, IN PERSON, W PHYSICIAN ANALYSIS: ICD-10-PCS | Mod: ,,, | Performed by: INTERNAL MEDICINE

## 2023-05-21 PROCEDURE — 25000003 PHARM REV CODE 250: Performed by: STUDENT IN AN ORGANIZED HEALTH CARE EDUCATION/TRAINING PROGRAM

## 2023-05-21 PROCEDURE — 85610 PROTHROMBIN TIME: CPT | Performed by: STUDENT IN AN ORGANIZED HEALTH CARE EDUCATION/TRAINING PROGRAM

## 2023-05-21 PROCEDURE — 94761 N-INVAS EAR/PLS OXIMETRY MLT: CPT

## 2023-05-21 PROCEDURE — 97165 OT EVAL LOW COMPLEX 30 MIN: CPT

## 2023-05-21 RX ADMIN — Medication 400 MG: at 02:05

## 2023-05-21 RX ADMIN — POLYETHYLENE GLYCOL 3350 17 G: 17 POWDER, FOR SOLUTION ORAL at 08:05

## 2023-05-21 RX ADMIN — DIGOXIN 0.12 MG: 125 TABLET ORAL at 08:05

## 2023-05-21 RX ADMIN — LISINOPRIL 5 MG: 5 TABLET ORAL at 08:05

## 2023-05-21 RX ADMIN — ATORVASTATIN CALCIUM 80 MG: 40 TABLET, FILM COATED ORAL at 08:05

## 2023-05-21 RX ADMIN — DOCUSATE SODIUM 100 MG: 100 CAPSULE, LIQUID FILLED ORAL at 05:05

## 2023-05-21 RX ADMIN — FUROSEMIDE 40 MG: 40 TABLET ORAL at 08:05

## 2023-05-21 RX ADMIN — WARFARIN SODIUM 5 MG: 5 TABLET ORAL at 05:05

## 2023-05-21 RX ADMIN — OXYCODONE HYDROCHLORIDE 5 MG: 5 TABLET ORAL at 06:05

## 2023-05-21 RX ADMIN — SPIRONOLACTONE 25 MG: 25 TABLET, FILM COATED ORAL at 08:05

## 2023-05-21 RX ADMIN — OXYCODONE HYDROCHLORIDE 5 MG: 5 TABLET ORAL at 05:05

## 2023-05-21 RX ADMIN — Medication 400 MG: at 08:05

## 2023-05-21 RX ADMIN — AMLODIPINE BESYLATE 5 MG: 5 TABLET ORAL at 08:05

## 2023-05-21 RX ADMIN — Medication 324 MG: at 07:05

## 2023-05-21 RX ADMIN — MIRTAZAPINE 30 MG: 30 TABLET, FILM COATED ORAL at 08:05

## 2023-05-21 NOTE — ASSESSMENT & PLAN NOTE
-hx of Chronic R subdural hematoma  -Neurosurgery consult  -Repeat CT unchanged from previous  -Unable to complete MRI to evaluate R weakness due to VAD  -PT/OT evaluation  -Decreased INR goal to 1.5-2.0 going forward   -Resumed coumadin  -INR was 3.2 on admit and is 1.5 today

## 2023-05-21 NOTE — ASSESSMENT & PLAN NOTE
-INR 3.2 on admit  -Coumadin held  -INR goal changed to 1.5-2.0  -INR 1.5 today: will resume coumadin

## 2023-05-21 NOTE — PROGRESS NOTES
05/21/2023  Claritza Miguel    Current provider:  Mike Chauhan MD    Device interrogation:  TXP LVAD INTERROGATIONS 5/21/2023 5/21/2023 5/21/2023 5/20/2023 5/20/2023 5/20/2023 5/20/2023   Type HeartMate3 HeartMate3 HeartMate3 HeartMate3 HeartMate3 HeartMate3 HeartMate3   Flow 4.2 4.2 4.1 4.3 4.6 4.6 4.4   Speed 5250 5200 5200 5250 5200 5300 5200   PI 5.5 6.9 6.7 5.7 5.0 4.7 5.8   Power (Edwards) 3.5 3.7 3.6 3.7 3.7 3.7 3.7   LSL 4800 4800 4800 4800 4800 4800 4800   Pulsatility Pulse Pulse Pulse Pulse Pulse Pulse Pulse          Rounded on Rocco France to ensure all mechanical assist device settings (IABP or VAD) were appropriate and all parameters were within limits.  I was able to ensure all back up equipment was present, the staff had no issues, and the Perfusion Department daily rounding was complete.      For implantable VADs: Interrogation of Ventricular assist device was performed with analysis of device parameters and review of device function. I have personally reviewed the interrogation findings and agree with findings as stated.     In emergency, the nursing units have been notified to contact the perfusion department either by:  Calling [phone] from 630am to 4pm Mon thru Fri, utilizing the On-Call Finder functionality of Epic and searching for Perfusion, or by contacting the hospital  from 4pm to 630am and on weekends and asking to speak with the perfusionist on call.    2:46 PM

## 2023-05-21 NOTE — SUBJECTIVE & OBJECTIVE
Interval History: Repeat CT of head looked stable.  Neurosurgery signed off as no further intervention necessary.  PT eval done and awaiting OT.  Patient reports feeling well and having no new complaints.  He is net negative 1.1L in the last 24 hours.  INR is 1.5 which is at goal for him      Continuous Infusions:  Scheduled Meds:   amLODIPine  5 mg Oral Daily    atorvastatin  80 mg Oral Daily    digoxin  0.125 mg Oral Daily    docusate sodium  100 mg Oral Daily    ferrous gluconate  324 mg Oral Daily with breakfast    furosemide  40 mg Oral Daily    lisinopriL  5 mg Oral Daily    magnesium oxide  400 mg Oral TID    mirtazapine  30 mg Oral QHS    polyethylene glycol  17 g Oral Daily    SITagliptin phosphate  100 mg Oral Daily    spironolactone  25 mg Oral Daily    [START ON 5/22/2023] warfarin  2.5 mg Oral Every Mon, Wed, Fri    warfarin  5 mg Oral Every Tues, Thurs, Sat, Sun     PRN Meds:acetaminophen, dextrose 10%, dextrose 10%, dextrose, dextrose, glucagon (human recombinant), insulin aspart U-100, ondansetron, oxyCODONE, sodium chloride 0.9%    Review of patient's allergies indicates:   Allergen Reactions    Pcn [penicillins] Hives     Objective:     Vital Signs (Most Recent):  Temp: 97.7 °F (36.5 °C) (05/21/23 0732)  Pulse: 83 (05/21/23 0732)  Resp: 16 (05/21/23 0732)  BP: 93/64 (05/21/23 0732)  SpO2: 100 % (05/21/23 0732) Vital Signs (24h Range):  Temp:  [96.4 °F (35.8 °C)-98.6 °F (37 °C)] 97.7 °F (36.5 °C)  Pulse:  [] 83  Resp:  [16-21] 16  SpO2:  [95 %-100 %] 100 %  BP: ()/(0-76) 93/64     Patient Vitals for the past 72 hrs (Last 3 readings):   Weight   05/21/23 0736 68.1 kg (150 lb 2.1 oz)   05/20/23 0752 68.1 kg (150 lb 2.1 oz)   05/19/23 0600 67.6 kg (149 lb 0.5 oz)       Body mass index is 19.28 kg/m².      Intake/Output Summary (Last 24 hours) at 5/21/2023 0902  Last data filed at 5/21/2023 0440  Gross per 24 hour   Intake --   Output 950 ml   Net -950 ml         Hemodynamic Parameters:        Telemetry: reviewed     Physical Exam   Constitutional:       General: He is not in acute distress.     Appearance: Normal appearance. He is normal weight. He is not ill-appearing, toxic-appearing or diaphoretic.   HENT:      Head: Normocephalic and atraumatic.      Nose: Nose normal. No congestion.      Mouth/Throat:      Mouth: Mucous membranes are dry.      Pharynx: Oropharynx is clear.   Eyes:      Extraocular Movements: Extraocular movements intact.      Conjunctiva/sclera: Conjunctivae normal.      Pupils: Pupils are equal, round, and reactive to light.   Cardiovascular:      Rate and Rhythm: Normal rate and regular rhythm.      Pulses: Normal pulses.      Comments: Lvad hum  Pulmonary:      Effort: Pulmonary effort is normal. No respiratory distress.      Breath sounds: Normal breath sounds. No wheezing or rhonchi.   Abdominal:      General: Abdomen is flat. Bowel sounds are normal.      Palpations: Abdomen is soft.   Musculoskeletal:         General: No swelling or tenderness. Normal range of motion.      Cervical back: Normal range of motion and neck supple. No rigidity or tenderness.   Skin:     General: Skin is warm and dry.      Capillary Refill: Capillary refill takes less than 2 seconds.   Neurological:      Mental Status: He is alert and oriented to person, place, and time.      Motor: Weakness present.      Comments: R arm weakness   Psychiatric:         Mood and Affect: Mood normal.         Thought Content: Thought content normal.     Significant Labs:  CBC:  Recent Labs   Lab 05/19/23 0342 05/20/23 0244 05/21/23  0551   WBC 9.76 9.57 11.84   RBC 3.09* 3.36* 3.39*   HGB 8.5* 9.0* 9.2*   HCT 27.9* 30.1* 31.0*    231 299   MCV 90 90 91   MCH 27.5 26.8* 27.1   MCHC 30.5* 29.9* 29.7*       BNP:  No results for input(s): BNP in the last 168 hours.    Invalid input(s): BNPTRIAGELBLO  CMP:  Recent Labs   Lab 05/19/23 0342 05/20/23  0244 05/21/23  0551     106 106  106 134*  134*    CALCIUM 8.3*  8.3* 9.0  9.0 9.6  9.6   ALBUMIN 2.0* 2.2* 2.3*   PROT 8.3 9.1* 9.7*   *  135* 135*  135* 133*  133*   K 3.8  3.8 4.3  4.3 3.9  3.9   CO2 23  23 28  28 25  25     106 101  101 99  99   BUN 12  12 14  14 15  15   CREATININE 0.6  0.6 0.7  0.7 0.9  0.9   ALKPHOS 95 100 124   ALT 10 8* 8*   AST 16 13 12   BILITOT 0.6 0.8 0.9        Coagulation:   Recent Labs   Lab 05/19/23  0342 05/20/23  0244 05/21/23  0551   INR 2.9* 1.9* 1.5*       LDH:  Recent Labs   Lab 05/18/23 2027          Microbiology:  Microbiology Results (last 7 days)       ** No results found for the last 168 hours. **            I have reviewed all pertinent labs within the past 24 hours.    Estimated Creatinine Clearance: 76.7 mL/min (based on SCr of 0.9 mg/dL).    Diagnostic Results:  I have reviewed all pertinent imaging results/findings within the past 24 hours.

## 2023-05-21 NOTE — PLAN OF CARE
Pt completed therapy session with good engagement    Problem: Occupational Therapy  Goal: Occupational Therapy Goal  Description: Goals to be met by: 6/20/23     Patient will increase functional independence with ADLs by performing:    UE Dressing with Stand-by Assistance.  Grooming while standing at sink with Stand-by Assistance.  Toileting from toilet with Stand-by Assistance for hygiene and clothing management.   Bathing from  shower chair/bench with Stand-by Assistance.    5/21/2023 1257 by Suzanen Danielle OT  Outcome: Ongoing, Progressing

## 2023-05-21 NOTE — PROGRESS NOTES
[facility] - Cardiology Stepdown  Heart Transplant  Progress Note    Patient Name: Rocco France  MRN: [id number]  Admission Date: 5/18/2023  Hospital Length of Stay: 3 days  Attending Physician: Mike Chauhan MD  Primary Care Provider: Jay Guardado DO  Principal Problem:Subdural hematoma, nontraumatic    Subjective:     Interval History: Repeat CT of head looked stable.  Neurosurgery signed off as no further intervention necessary.  PT eval done and awaiting OT.  Patient reports feeling well and having no new complaints.  He is net negative 1.1L in the last 24 hours.  INR is 1.5 which is at goal for him      Continuous Infusions:  Scheduled Meds:   amLODIPine  5 mg Oral Daily    atorvastatin  80 mg Oral Daily    digoxin  0.125 mg Oral Daily    docusate sodium  100 mg Oral Daily    ferrous gluconate  324 mg Oral Daily with breakfast    furosemide  40 mg Oral Daily    lisinopriL  5 mg Oral Daily    magnesium oxide  400 mg Oral TID    mirtazapine  30 mg Oral QHS    polyethylene glycol  17 g Oral Daily    SITagliptin phosphate  100 mg Oral Daily    spironolactone  25 mg Oral Daily    [START ON 5/22/2023] warfarin  2.5 mg Oral Every Mon, Wed, Fri    warfarin  5 mg Oral Every Tues, Thurs, Sat, Sun     PRN Meds:acetaminophen, dextrose 10%, dextrose 10%, dextrose, dextrose, glucagon (human recombinant), insulin aspart U-100, ondansetron, oxyCODONE, sodium chloride 0.9%    Review of patient's allergies indicates:   Allergen Reactions    Pcn [penicillins] Hives     Objective:     Vital Signs (Most Recent):  Temp: 97.7 °F (36.5 °C) (05/21/23 0732)  Pulse: 83 (05/21/23 0732)  Resp: 16 (05/21/23 0732)  BP: 93/64 (05/21/23 0732)  SpO2: 100 % (05/21/23 0732) Vital Signs (24h Range):  Temp:  [96.4 °F (35.8 °C)-98.6 °F (37 °C)] 97.7 °F (36.5 °C)  Pulse:  [] 83  Resp:  [16-21] 16  SpO2:  [95 %-100 %] 100 %  BP: ()/(0-76) 93/64     Patient Vitals for the past 72 hrs (Last 3 readings):   Weight   05/21/23  0736 68.1 kg (150 lb 2.1 oz)   05/20/23 0752 68.1 kg (150 lb 2.1 oz)   05/19/23 0600 67.6 kg (149 lb 0.5 oz)       Body mass index is 19.28 kg/m².      Intake/Output Summary (Last 24 hours) at 5/21/2023 0902  Last data filed at 5/21/2023 0440  Gross per 24 hour   Intake --   Output 950 ml   Net -950 ml         Hemodynamic Parameters:       Telemetry: reviewed     Physical Exam   Constitutional:       General: He is not in acute distress.     Appearance: Normal appearance. He is normal weight. He is not ill-appearing, toxic-appearing or diaphoretic.   HENT:      Head: Normocephalic and atraumatic.      Nose: Nose normal. No congestion.      Mouth/Throat:      Mouth: Mucous membranes are dry.      Pharynx: Oropharynx is clear.   Eyes:      Extraocular Movements: Extraocular movements intact.      Conjunctiva/sclera: Conjunctivae normal.      Pupils: Pupils are equal, round, and reactive to light.   Cardiovascular:      Rate and Rhythm: Normal rate and regular rhythm.      Pulses: Normal pulses.      Comments: Lvad hum  Pulmonary:      Effort: Pulmonary effort is normal. No respiratory distress.      Breath sounds: Normal breath sounds. No wheezing or rhonchi.   Abdominal:      General: Abdomen is flat. Bowel sounds are normal.      Palpations: Abdomen is soft.   Musculoskeletal:         General: No swelling or tenderness. Normal range of motion.      Cervical back: Normal range of motion and neck supple. No rigidity or tenderness.   Skin:     General: Skin is warm and dry.      Capillary Refill: Capillary refill takes less than 2 seconds.   Neurological:      Mental Status: He is alert and oriented to person, place, and time.      Motor: Weakness present.      Comments: R arm weakness   Psychiatric:         Mood and Affect: Mood normal.         Thought Content: Thought content normal.     Significant Labs:  CBC:  Recent Labs   Lab 05/19/23  0342 05/20/23  0244 05/21/23  0551   WBC 9.76 9.57 11.84   RBC 3.09* 3.36*  3.39*   HGB 8.5* 9.0* 9.2*   HCT 27.9* 30.1* 31.0*    231 299   MCV 90 90 91   MCH 27.5 26.8* 27.1   MCHC 30.5* 29.9* 29.7*       BNP:  No results for input(s): BNP in the last 168 hours.    Invalid input(s): BNPTRIAGELBLO  CMP:  Recent Labs   Lab 05/19/23  0342 05/20/23  0244 05/21/23  0551     106 106  106 134*  134*   CALCIUM 8.3*  8.3* 9.0  9.0 9.6  9.6   ALBUMIN 2.0* 2.2* 2.3*   PROT 8.3 9.1* 9.7*   *  135* 135*  135* 133*  133*   K 3.8  3.8 4.3  4.3 3.9  3.9   CO2 23  23 28  28 25  25     106 101  101 99  99   BUN 12  12 14  14 15  15   CREATININE 0.6  0.6 0.7  0.7 0.9  0.9   ALKPHOS 95 100 124   ALT 10 8* 8*   AST 16 13 12   BILITOT 0.6 0.8 0.9        Coagulation:   Recent Labs   Lab 05/19/23 0342 05/20/23  0244 05/21/23  0551   INR 2.9* 1.9* 1.5*       LDH:  Recent Labs   Lab 05/18/23 2027          Microbiology:  Microbiology Results (last 7 days)       ** No results found for the last 168 hours. **            I have reviewed all pertinent labs within the past 24 hours.    Estimated Creatinine Clearance: 76.7 mL/min (based on SCr of 0.9 mg/dL).    Diagnostic Results:  I have reviewed all pertinent imaging results/findings within the past 24 hours.    Assessment and Plan:     Mr. Rocco France is a pleasant 67 y.o.  male with a past medical history remarkable for non-ischemic cardiomyopathy status post DT HM3 implantation 1/13/2021. He was transferred today from [facility] for neurosurgical evaluation of a R. Subdural hematoma seen on CT head today. Last INR on records 3.4. Mr. France says on Saturday 05/13 around 5am he stood up used the bathroom and went to his chair. He went to grab something on the floor when all of sudden his legs gave out and he slip to the floor. While trying to pick himself up he noticed his R. Sided was weak and had to forced himself up with his Left side. He went to sleep until 9am when he  stood up to the bathroom and as he was walking back to the bed he look down and same episode happen. His legs gave out and he fell to the floor. He then called his neighbor to help him stand up. Denied any head trauma but his R. Arm has been hurting which he was told in the ER at the OSH that he had no fractures. Per records XR read as Right humerus negative. He denied any presyncopal symptoms, confusion, diarrhea or any changes that could have precipitated his symptoms. He has home health who monitor his BP but he does not remember his usual numbers. His wife also noticed a new R. Hand tremor. On arrival to the unit patient was AAOx4 with mild weakness on RUE only plus a intermittent right hand tremor.     Patient had been admitted in the past on 1/27/22 after a syncopal event and was found to have an evolving R. Cerebral acute subdural hematoma plus acute basal cistern subarachnoid hemorrhage. At that time patient was intubated and had a prolonged hospitalization complicated by COVID-19. He was taken off of aspirin and discharged home 2/17/2022    CT head w/o contrast from OSH read: Small right subdural hematoma. No associated mass effect. No midline shift. Interpretation time 15:45 05/18/2023.     PMHx also remarkable for:   -Acute on chronic systolic/diastolic HF  -CAD  - PAF  - DM2  - Orthostatic hypotension  - HTN        * Subdural hematoma, nontraumatic  -hx of Chronic R subdural hematoma  -Neurosurgery consult  -Repeat CT unchanged from previous  -Unable to complete MRI to evaluate R weakness due to VAD  -PT/OT evaluation  -Decreased INR goal to 1.5-2.0 going forward   -Resumed coumadin  -INR was 3.2 on admit and is 1.5 today      LVAD (left ventricular assist device) present  -HeartMate 3 Implanted 1/13/21 as DT  -Coumadin held on admit, New goal 1.5-2.0. therapeutic today. (previous INR goal was 2.0-2.5 but decreased this admission).  Home dose of Coumadin was 2.5mg Monday and 5mg Qdaily  -Antiplatelets  Not on ASA given SDH  -LDH is stable overall today. Will continue to monitor daily.  -Speed set at 5200 rpm  -Interrogation notable for PI events  -Not listed for OHTx    Procedure: Device Interrogation Including analysis of device parameters  Current Settings: Ventricular Assist Device  Review of device function is stable    TXP LVAD INTERROGATIONS 5/20/2023 5/20/2023 5/20/2023 5/19/2023 5/19/2023 5/19/2023 5/19/2023   Type HeartMate3 HeartMate3 HeartMate3 HeartMate3 HeartMate3 HeartMate3 HeartMate3   Flow 4.4 3.9 4.2 4.7 4.4 4.5 4.7   Speed 5200 5200 5200 5200 5200 5200 5200   PI 5.8 8.2 6.4 4.9 4.6 4.6 4.6   Power (Edwards) 3.7 3.7 3.7 3.7 3.5 3.6 3.6   LSL 4800 4800 4800 4800 4800 4800 -   Pulsatility Pulse Pulse Pulse Pulse Pulse Pulse -       Chronic combined systolic and diastolic heart failure  -NICM  -Last 2D Echo 4/5/23: LVEF 20%, LVEDD 6.48 cm with speed at 5100.  Will repeat echo  -Euvolemic on examination today  -Current diuretic regimen: Lasix 40mg Qdaily  -GDMT with home dose of Lisinopril, Digoxin, Aldactone  -2g Na dietary restriction, 1500 mL fluid restriction, strict I/Os      Muscle weakness of right upper extremity  - CT of cervical spine 5/19 consistent with multilevel degenerative changes.  Mod-sever spinal canal stenosis C2-C5.  Moderate neural foraminal narrowing C3-C4.  CT of head without contrast ordered.   -Unable to complete MRI due to VAD  -PT/OT consulted for evaluation and recommendations     Anticoagulated  -INR 3.2 on admit  -Coumadin held  -INR goal changed to 1.5-2.0  -INR 1.5 today: will resume coumadin       Normocytic anemia  -Hx of iron deficiency and acute blood loss. On chronic anticoagulation.   -Continue supplemental iron    Orthostatic hypotension  -Hx of orthostatic hypotension with falls and lightheadedness, for which his BP meds were reduced dating back to 7/13/2022. -Recent event had been preceeded by standing up and walking to the bathroom then sudden fall when looking  down.   - Monitoring BP   -Will get PT/OT evaluation    Type 2 diabetes mellitus without complication  -A1c 6.5 8 months ago. Keeping on ISS.   -Is on Januvia at home     ICD (implantable cardioverter-defibrillator) in place  -No ICD shocks reported.       CT Elizabeth  Heart Transplant  [facility] - Cardiology Stepdown

## 2023-05-21 NOTE — PLAN OF CARE
Pt aox4, reports pain is resolving  No change in pt at this time  Blood glucose WNL  Expresses readiness for discharge  All pt needs are met, in no apparent distress.         Problem: Adult Inpatient Plan of Care  Goal: Plan of Care Review  Outcome: Ongoing, Progressing  Goal: Patient-Specific Goal (Individualized)  Outcome: Ongoing, Progressing  Goal: Absence of Hospital-Acquired Illness or Injury  Outcome: Ongoing, Progressing  Goal: Optimal Comfort and Wellbeing  Outcome: Ongoing, Progressing  Goal: Readiness for Transition of Care  Outcome: Ongoing, Progressing     Problem: Diabetes Comorbidity  Goal: Blood Glucose Level Within Targeted Range  Outcome: Ongoing, Progressing     Problem: Fall Injury Risk  Goal: Absence of Fall and Fall-Related Injury  Outcome: Ongoing, Progressing

## 2023-05-21 NOTE — PROCEDURES
Interrogation of Ventricular assist device was performed with physician analysis of device parameters and review of device function. I have personally reviewed the interrogation findings and agree with findings as stated.   Procedure: Device Interrogation Including analysis of device parameters  Current Settings: Ventricular Assist Device  Review of device function is stable  TXP LVAD INTERROGATIONS 5/21/2023 5/21/2023 5/20/2023 5/20/2023 5/20/2023 5/20/2023 5/20/2023   Type HeartMate3 HeartMate3 HeartMate3 HeartMate3 HeartMate3 HeartMate3 HeartMate3   Flow 4.2 4.1 4.3 4.6 4.6 4.4 3.9   Speed 5200 5200 5250 5200 5300 5200 5200   PI 6.9 6.7 5.7 5.0 4.7 5.8 8.2   Power (Edwards) 3.7 3.6 3.7 3.7 3.7 3.7 3.7   LSL 4800 4800 4800 4800 4800 4800 4800   Pulsatility Pulse Pulse Pulse Pulse Pulse Pulse Pulse        Mike Chauhan MD

## 2023-05-21 NOTE — PT/OT/SLP EVAL
Occupational Therapy Evaluation and Treatment    Patient Name: Rocco France   MRN: [id number]  Admitting Diagnosis: Subdural hematoma, nontraumatic   Recent Surgery: * No surgery found *      Recommendations:     Discharge Recommendations: outpatient OT (RUE weakness)  Level of Assistance Recommended: 24 hours supervision  Discharge Equipment Recommendations: grab bar (for t/s combo and and toilet)  Barriers to discharge: None    Assessment:     Rocco France is a 67 y.o. male with a medical diagnosis of Subdural hematoma, nontraumatic. He presents with performance deficits affecting function including weakness, impaired endurance, impaired self care skills, impaired functional mobility, gait instability, impaired balance, decreased coordination, decreased upper extremity function, decreased safety awareness, abnormal tone, decreased ROM, impaired fine motor, impaired cardiopulmonary response to activity. Pt responded well to therapy this date, though demonstrated weakness in RUE.  Pt had 2 follows prior to admit when bending down to  something from the ground. This date, pt encountered sitting in upright chair.  He was able to walk to toilet, use bathroom while sitting, complete grooming ADLs, and then return to upright chair to complete therapeutic exercises.  Pt would be excellent candidate for further therapy to maximize healing, strengthening, and ROM in RUE.     Rehab Prognosis: Good; patient would benefit from acute OT services to address these deficits and reach maximum level of function.    Plan:     Patient to be seen 3 x/week to address the above listed problems via self-care/home management, therapeutic activities, therapeutic exercises  Plan of Care Expires: 06/20/23  Plan of Care Reviewed with: patient      Subjective     Chief Complaint: No chief complaint on file.    Patient Comments/Goals: Get better   Pain/Comfort:  Pain Rating 1: 0/10    Patients cultural, spiritual, Zoroastrianism conflicts  given the current situation: no    Social History:  Living Environment: Patient lives with their spouse in a single story home with number of outside stair(s): 3STE no HR and tub-shower combo  Prior Level of Function: Prior to admission, patient was modified independent.  Roles and Routines: Patient was not working prior to admission.  Equipment Used at Home: shower chair, hand held shower  DME owned (not currently used): single point cane  Assistance Upon Discharge: significant other      Objective:     Communicated with Zahida prior to session. Patient found up in chair with telemetry, LVAD upon OT entry to room.    General Precautions: Standard, fall   Orthopedic Precautions: N/A   Braces: N/A    Respiratory Status: Room air    Occupational Performance    Gait belt applied - No    Bed Mobility:   Not completed, encountered in upright chair     Functional Mobility/Transfers:  Sit <> Stand Transfer with stand by assistance with no AD, to/from upright chair and to/from toilet  Toilet Transfer Step Transfer technique with stand by assistance with no AD  Functional Mobility: Pt engaging in functional mobility to simulate household/community distances with SBA and utilizing no AD in order to maximize functional activity tolerance and standing balance required for engagement in occupations of choice.     Activities of Daily Living:  Grooming: stand by assistance, washing hands at sink  Toileting: SBA toileting  Upper Body Dressing: stand by assistance managing gown    Therapeutic Exercises:    - Educated/completed BUE exercises including:   10x arm raises  10x chest presses  10x shoulder shrugs  10x hand squeezes    Pt left with hand squeeze fashioned from washcloth/coban    Cognitive/Visual Perceptual:  Cognitive/Psychosocial Skills:    -     Oriented to: Person, Place, Time, Situation  -     Follows Commands/attention: Follows multistep  commands  -     Communication: clear/fluent  -     Memory: No Deficits noted  -      Safety awareness/insight to disability: impaired  -     Mood/Affect/Coping skills/emotional control: Appropriate to situation  Visual/Perceptual:    -     Intact    Hearing: Intact    Physical Exam:  Balance:    -     Standing: stand by assistance  Postural examination/scapula alignment:    -       Rounded shoulders  -       Forward head  Skin integrity: Visible skin intact  Motor Planning: Intact  Dominant hand: Right  Upper Extremity Range of Motion:     -       Right Upper Extremity: WFL  -       Left Upper Extremity: WFL  Upper Extremity Strength:    -       Right Upper Extremity: Deficits: 3+/5  -       Left Upper Extremity: WFL   Strength:    -       Right Upper Extremity: Deficits: 2/5  -       Left Upper Extremity: WFL  Fine Motor Coordination:    -       Impaired  Right hand, finger to nose   and Right hand thumb/finger opposition skills    Gross motor coordination:   WFL    AMPAC 6 Click ADL:  AMPAC Total Score: 24    Treatment & Education:  Therapist provided facilitation and instruction of proper body mechanics, energy conservation, and fall prevention strategies during tasks listed above.  Patient educated on role of OT, POC, and goals for therapy  Patient educated on importance of OOB activities with staff member assistance and sitting OOB majority of the day.    Patient clear to ambulate to/from bathroom with RN/PCT, assist xSBA .    Patient left up in chair with all lines intact, call button in reach, and RN notified.    GOALS:   Multidisciplinary Problems       Occupational Therapy Goals          Problem: Occupational Therapy    Goal Priority Disciplines Outcome Interventions   Occupational Therapy Goal     OT, PT/OT Ongoing, Progressing    Description: Goals to be met by: 6/20/23     Patient will increase functional independence with ADLs by performing:    UE Dressing with Stand-by Assistance.  Grooming while standing at sink with Stand-by Assistance.  Toileting from toilet with Stand-by  Assistance for hygiene and clothing management.   Bathing from  shower chair/bench with Stand-by Assistance.                           History:     Past Medical History:   Diagnosis Date    Acute respiratory failure requiring reintubation 1/28/2022    CLARISSE (acute kidney injury) 1/11/2021    Diabetes mellitus     Kidney stones          Past Surgical History:   Procedure Laterality Date    CARDIAC CATHETERIZATION  2010    no stents    CARDIAC DEFIBRILLATOR PLACEMENT  2010    CARDIAC DEFIBRILLATOR PLACEMENT      HERNIA REPAIR      IRRIGATION OF MEDIASTINUM N/A 1/14/2021    Procedure: IRRIGATION, MEDIASTINUM;  Surgeon: Zenon Corona MD;  [facility];  Service: Cardiovascular;  Laterality: N/A;    KNEE ARTHROSCOPY Right     LEFT VENTRICULAR ASSIST DEVICE Left 1/13/2021    Procedure: INSERTION- HeartMate 3 LEFT VENTRICULAR ASSIST DEVICE ;  Surgeon: Zenon Corona MD;  [facility];  Service: Cardiovascular;  Laterality: Left;    RECONSTRUCTION OF PERICARDIUM N/A 1/14/2021    Procedure: RECONSTRUCTION, PERICARDIUM;  Surgeon: Zenon Corona MD;  [facility];  Service: Cardiovascular;  Laterality: N/A;    RIGHT HEART CATHETERIZATION Right 11/2/2020    Procedure: INSERTION, CATHETER, RIGHT HEART;  Surgeon: Deana Lake MD;  Location: [facility] CATH LAB;  Service: Cardiology;  Laterality: Right;    RIGHT HEART CATHETERIZATION Right 3/24/2022    Procedure: INSERTION, CATHETER, RIGHT HEART;  Surgeon: Manuel Ramos Jr., MD;  Location: [facility] CATH LAB;  Service: Cardiology;  Laterality: Right;    STERNAL WOUND CLOSURE  1/13/2021    Procedure: TEMPORARY CLOSURE OF CHEST;  Surgeon: Zenon Corona MD;  [facility];  Service: Cardiovascular;;    STERNAL WOUND CLOSURE N/A 1/14/2021    Procedure: CLOSURE, WOUND, STERNUM;  Surgeon: Zenon Corona MD;  [facility];  Service: Cardiovascular;  Laterality: N/A;       Time Tracking:     OT Date of Treatment: 05/21/23  OT  Start Time: 1000  OT Stop Time: 1019  OT Total Time (min): 19 min    Billable Minutes: Evaluation 10 and Therapeutic Exercise 9    5/21/2023

## 2023-05-22 ENCOUNTER — TELEPHONE (OUTPATIENT)
Dept: NEUROLOGY | Facility: CLINIC | Age: 68
End: 2023-05-22
Payer: MEDICARE

## 2023-05-22 LAB
ALBUMIN SERPL BCP-MCNC: 2.2 G/DL (ref 3.5–5.2)
ALP SERPL-CCNC: 117 U/L (ref 55–135)
ALT SERPL W/O P-5'-P-CCNC: 7 U/L (ref 10–44)
ANION GAP SERPL CALC-SCNC: 6 MMOL/L (ref 8–16)
ANION GAP SERPL CALC-SCNC: 6 MMOL/L (ref 8–16)
ASCENDING AORTA: 3.15 CM
AST SERPL-CCNC: 11 U/L (ref 10–40)
BASOPHILS # BLD AUTO: 0.05 K/UL (ref 0–0.2)
BASOPHILS NFR BLD: 0.5 % (ref 0–1.9)
BILIRUB SERPL-MCNC: 0.7 MG/DL (ref 0.1–1)
BSA FOR ECHO PROCEDURE: 1.87 M2
BUN SERPL-MCNC: 15 MG/DL (ref 8–23)
BUN SERPL-MCNC: 15 MG/DL (ref 8–23)
CALCIUM SERPL-MCNC: 9.6 MG/DL (ref 8.7–10.5)
CALCIUM SERPL-MCNC: 9.6 MG/DL (ref 8.7–10.5)
CHLORIDE SERPL-SCNC: 99 MMOL/L (ref 95–110)
CHLORIDE SERPL-SCNC: 99 MMOL/L (ref 95–110)
CO2 SERPL-SCNC: 26 MMOL/L (ref 23–29)
CO2 SERPL-SCNC: 26 MMOL/L (ref 23–29)
CREAT SERPL-MCNC: 0.8 MG/DL (ref 0.5–1.4)
CREAT SERPL-MCNC: 0.8 MG/DL (ref 0.5–1.4)
CV ECHO LV RWT: 0.23 CM
DIFFERENTIAL METHOD: ABNORMAL
DOP CALC LVOT AREA: 3.9 CM2
DOP CALC LVOT DIAMETER: 2.23 CM
E WAVE DECELERATION TIME: 260.15 MSEC
E/A RATIO: 0.9
E/E' RATIO: 4.6 M/S
ECHO LV POSTERIOR WALL: 0.78 CM (ref 0.6–1.1)
EJECTION FRACTION: 10 %
EOSINOPHIL # BLD AUTO: 0.2 K/UL (ref 0–0.5)
EOSINOPHIL NFR BLD: 2.2 % (ref 0–8)
ERYTHROCYTE [DISTWIDTH] IN BLOOD BY AUTOMATED COUNT: 15.5 % (ref 11.5–14.5)
EST. GFR  (NO RACE VARIABLE): >60 ML/MIN/1.73 M^2
EST. GFR  (NO RACE VARIABLE): >60 ML/MIN/1.73 M^2
FRACTIONAL SHORTENING: 14 % (ref 28–44)
GLUCOSE SERPL-MCNC: 104 MG/DL (ref 70–110)
GLUCOSE SERPL-MCNC: 104 MG/DL (ref 70–110)
HCT VFR BLD AUTO: 29 % (ref 40–54)
HGB BLD-MCNC: 9 G/DL (ref 14–18)
IMM GRANULOCYTES # BLD AUTO: 0.05 K/UL (ref 0–0.04)
IMM GRANULOCYTES NFR BLD AUTO: 0.5 % (ref 0–0.5)
INR PPP: 1.4 (ref 0.8–1.2)
INTERVENTRICULAR SEPTUM: 0.51 CM (ref 0.6–1.1)
IVRT: 114.18 MSEC
LA MAJOR: 5.46 CM
LA MINOR: 5.18 CM
LA WIDTH: 5.09 CM
LEFT ATRIUM SIZE: 3.45 CM
LEFT ATRIUM VOLUME INDEX MOD: 27.4 ML/M2
LEFT ATRIUM VOLUME INDEX: 41.5 ML/M2
LEFT ATRIUM VOLUME MOD: 52.24 CM3
LEFT ATRIUM VOLUME: 79.35 CM3
LEFT CBA DIAS: 14 CM/S
LEFT CBA SYS: 39 CM/S
LEFT CCA DIST DIAS: 30 CM/S
LEFT CCA DIST SYS: 68 CM/S
LEFT CCA MID DIAS: 31 CM/S
LEFT CCA MID SYS: 70 CM/S
LEFT CCA PROX DIAS: 31 CM/S
LEFT CCA PROX SYS: 72 CM/S
LEFT ECA DIAS: 23 CM/S
LEFT ECA SYS: 69 CM/S
LEFT ICA DIST DIAS: 31 CM/S
LEFT ICA DIST SYS: 67 CM/S
LEFT ICA MID DIAS: 33 CM/S
LEFT ICA MID SYS: 63 CM/S
LEFT ICA PROX DIAS: 28 CM/S
LEFT ICA PROX SYS: 53 CM/S
LEFT INTERNAL DIMENSION IN SYSTOLE: 5.74 CM (ref 2.1–4)
LEFT VENTRICLE DIASTOLIC VOLUME INDEX: 118.63 ML/M2
LEFT VENTRICLE DIASTOLIC VOLUME: 226.59 ML
LEFT VENTRICLE MASS INDEX: 90 G/M2
LEFT VENTRICLE SYSTOLIC VOLUME INDEX: 85.2 ML/M2
LEFT VENTRICLE SYSTOLIC VOLUME: 162.7 ML
LEFT VENTRICULAR INTERNAL DIMENSION IN DIASTOLE: 6.64 CM (ref 3.5–6)
LEFT VENTRICULAR MASS: 171.93 G
LEFT VERTEBRAL DIAS: 23 CM/S
LEFT VERTEBRAL SYS: 38 CM/S
LV LATERAL E/E' RATIO: 3.54 M/S
LV SEPTAL E/E' RATIO: 6.57 M/S
LYMPHOCYTES # BLD AUTO: 1.8 K/UL (ref 1–4.8)
LYMPHOCYTES NFR BLD: 16.7 % (ref 18–48)
MAGNESIUM SERPL-MCNC: 2.3 MG/DL (ref 1.6–2.6)
MCH RBC QN AUTO: 28.2 PG (ref 27–31)
MCHC RBC AUTO-ENTMCNC: 31 G/DL (ref 32–36)
MCV RBC AUTO: 91 FL (ref 82–98)
MONOCYTES # BLD AUTO: 1.2 K/UL (ref 0.3–1)
MONOCYTES NFR BLD: 10.6 % (ref 4–15)
MV A" WAVE DURATION": 6.57 MSEC
MV PEAK A VEL: 0.51 M/S
MV PEAK E VEL: 0.46 M/S
MV STENOSIS PRESSURE HALF TIME: 75.44 MS
MV VALVE AREA P 1/2 METHOD: 2.92 CM2
NEUTROPHILS # BLD AUTO: 7.7 K/UL (ref 1.8–7.7)
NEUTROPHILS NFR BLD: 69.5 % (ref 38–73)
NRBC BLD-RTO: 0 /100 WBC
OHS CV CAROTID RIGHT ICA EDV HIGHEST: 30
OHS CV CAROTID ULTRASOUND LEFT ICA/CCA RATIO: 0.99
OHS CV CAROTID ULTRASOUND RIGHT ICA/CCA RATIO: 1.03
OHS CV PV CAROTID LEFT HIGHEST CCA: 72
OHS CV PV CAROTID LEFT HIGHEST ICA: 67
OHS CV PV CAROTID RIGHT HIGHEST CCA: 67
OHS CV PV CAROTID RIGHT HIGHEST ICA: 63
OHS CV US CAROTID LEFT HIGHEST EDV: 33
PHOSPHATE SERPL-MCNC: 3.1 MG/DL (ref 2.7–4.5)
PISA TR MAX VEL: 2.61 M/S
PLATELET # BLD AUTO: 253 K/UL (ref 150–450)
PMV BLD AUTO: 10.1 FL (ref 9.2–12.9)
POCT GLUCOSE: 110 MG/DL (ref 70–110)
POCT GLUCOSE: 131 MG/DL (ref 70–110)
POCT GLUCOSE: 179 MG/DL (ref 70–110)
POTASSIUM SERPL-SCNC: 4.8 MMOL/L (ref 3.5–5.1)
POTASSIUM SERPL-SCNC: 4.8 MMOL/L (ref 3.5–5.1)
PROT SERPL-MCNC: 9.1 G/DL (ref 6–8.4)
PROTHROMBIN TIME: 14.3 SEC (ref 9–12.5)
PULM VEIN S/D RATIO: 0.95
PV PEAK D VEL: 0.38 M/S
PV PEAK S VEL: 0.36 M/S
RA MAJOR: 5.6 CM
RA PRESSURE: 3 MMHG
RA WIDTH: 4.92 CM
RBC # BLD AUTO: 3.19 M/UL (ref 4.6–6.2)
RIGHT ATRIAL AREA: 24 CM2
RIGHT CBA DIAS: 10 CM/S
RIGHT CBA SYS: 46 CM/S
RIGHT CCA DIST DIAS: 27 CM/S
RIGHT CCA DIST SYS: 61 CM/S
RIGHT CCA MID DIAS: 18 CM/S
RIGHT CCA MID SYS: 51 CM/S
RIGHT CCA PROX DIAS: 29 CM/S
RIGHT CCA PROX SYS: 67 CM/S
RIGHT ECA DIAS: 8 CM/S
RIGHT ECA SYS: 51 CM/S
RIGHT ICA DIST DIAS: 30 CM/S
RIGHT ICA DIST SYS: 58 CM/S
RIGHT ICA MID DIAS: 23 CM/S
RIGHT ICA MID SYS: 61 CM/S
RIGHT ICA PROX DIAS: 27 CM/S
RIGHT ICA PROX SYS: 63 CM/S
RIGHT VENTRICULAR END-DIASTOLIC DIMENSION: 4.4 CM
RIGHT VERTEBRAL DIAS: 14 CM/S
RIGHT VERTEBRAL SYS: 29 CM/S
RV MID DIAMA: 4.5 CM
SINUS: 3.27 CM
SODIUM SERPL-SCNC: 131 MMOL/L (ref 136–145)
SODIUM SERPL-SCNC: 131 MMOL/L (ref 136–145)
STJ: 2.92 CM
TDI LATERAL: 0.13 M/S
TDI SEPTAL: 0.07 M/S
TDI: 0.1 M/S
TR MAX PG: 27 MMHG
TRICUSPID ANNULAR PLANE SYSTOLIC EXCURSION: 1.11 CM
TV REST PULMONARY ARTERY PRESSURE: 30 MMHG
WBC # BLD AUTO: 11 K/UL (ref 3.9–12.7)

## 2023-05-22 PROCEDURE — 36415 COLL VENOUS BLD VENIPUNCTURE: CPT | Performed by: STUDENT IN AN ORGANIZED HEALTH CARE EDUCATION/TRAINING PROGRAM

## 2023-05-22 PROCEDURE — 84100 ASSAY OF PHOSPHORUS: CPT | Performed by: STUDENT IN AN ORGANIZED HEALTH CARE EDUCATION/TRAINING PROGRAM

## 2023-05-22 PROCEDURE — 99233 PR SUBSEQUENT HOSPITAL CARE,LEVL III: ICD-10-PCS | Mod: ,,, | Performed by: INTERNAL MEDICINE

## 2023-05-22 PROCEDURE — 99223 PR INITIAL HOSPITAL CARE,LEVL III: ICD-10-PCS | Mod: ,,, | Performed by: PSYCHIATRY & NEUROLOGY

## 2023-05-22 PROCEDURE — 83735 ASSAY OF MAGNESIUM: CPT | Performed by: STUDENT IN AN ORGANIZED HEALTH CARE EDUCATION/TRAINING PROGRAM

## 2023-05-22 PROCEDURE — 85610 PROTHROMBIN TIME: CPT | Performed by: STUDENT IN AN ORGANIZED HEALTH CARE EDUCATION/TRAINING PROGRAM

## 2023-05-22 PROCEDURE — 25000003 PHARM REV CODE 250: Performed by: STUDENT IN AN ORGANIZED HEALTH CARE EDUCATION/TRAINING PROGRAM

## 2023-05-22 PROCEDURE — 99223 1ST HOSP IP/OBS HIGH 75: CPT | Mod: ,,, | Performed by: PSYCHIATRY & NEUROLOGY

## 2023-05-22 PROCEDURE — 99233 SBSQ HOSP IP/OBS HIGH 50: CPT | Mod: ,,, | Performed by: INTERNAL MEDICINE

## 2023-05-22 PROCEDURE — 93750 PR INTERROGATE VENT ASSIST DEV, IN PERSON, W PHYSICIAN ANALYSIS: ICD-10-PCS | Mod: ,,, | Performed by: INTERNAL MEDICINE

## 2023-05-22 PROCEDURE — 93750 INTERROGATION VAD IN PERSON: CPT | Mod: ,,, | Performed by: INTERNAL MEDICINE

## 2023-05-22 PROCEDURE — 25000003 PHARM REV CODE 250: Performed by: INTERNAL MEDICINE

## 2023-05-22 PROCEDURE — 94761 N-INVAS EAR/PLS OXIMETRY MLT: CPT

## 2023-05-22 PROCEDURE — 85025 COMPLETE CBC W/AUTO DIFF WBC: CPT | Performed by: STUDENT IN AN ORGANIZED HEALTH CARE EDUCATION/TRAINING PROGRAM

## 2023-05-22 PROCEDURE — 27000248 HC VAD-ADDITIONAL DAY

## 2023-05-22 PROCEDURE — 20600001 HC STEP DOWN PRIVATE ROOM

## 2023-05-22 PROCEDURE — 80053 COMPREHEN METABOLIC PANEL: CPT | Performed by: STUDENT IN AN ORGANIZED HEALTH CARE EDUCATION/TRAINING PROGRAM

## 2023-05-22 RX ADMIN — AMLODIPINE BESYLATE 5 MG: 5 TABLET ORAL at 09:05

## 2023-05-22 RX ADMIN — OXYCODONE HYDROCHLORIDE 5 MG: 5 TABLET ORAL at 12:05

## 2023-05-22 RX ADMIN — Medication 324 MG: at 09:05

## 2023-05-22 RX ADMIN — SPIRONOLACTONE 25 MG: 25 TABLET, FILM COATED ORAL at 09:05

## 2023-05-22 RX ADMIN — MIRTAZAPINE 30 MG: 30 TABLET, FILM COATED ORAL at 08:05

## 2023-05-22 RX ADMIN — Medication 400 MG: at 09:05

## 2023-05-22 RX ADMIN — Medication 400 MG: at 05:05

## 2023-05-22 RX ADMIN — Medication 400 MG: at 08:05

## 2023-05-22 RX ADMIN — ATORVASTATIN CALCIUM 80 MG: 40 TABLET, FILM COATED ORAL at 09:05

## 2023-05-22 RX ADMIN — POLYETHYLENE GLYCOL 3350 17 G: 17 POWDER, FOR SOLUTION ORAL at 09:05

## 2023-05-22 RX ADMIN — WARFARIN SODIUM 2.5 MG: 2.5 TABLET ORAL at 05:05

## 2023-05-22 RX ADMIN — DOCUSATE SODIUM 100 MG: 100 CAPSULE, LIQUID FILLED ORAL at 05:05

## 2023-05-22 RX ADMIN — OXYCODONE HYDROCHLORIDE 5 MG: 5 TABLET ORAL at 05:05

## 2023-05-22 RX ADMIN — FUROSEMIDE 40 MG: 40 TABLET ORAL at 09:05

## 2023-05-22 RX ADMIN — DIGOXIN 0.12 MG: 125 TABLET ORAL at 09:05

## 2023-05-22 RX ADMIN — LISINOPRIL 5 MG: 5 TABLET ORAL at 09:05

## 2023-05-22 NOTE — TELEPHONE ENCOUNTER
----- Message from Saad Arnold MD sent at 5/22/2023  5:20 PM CDT -----  Good afternoon!    Per Dr. Higuera' suggestion, could we schedule this patient for outpatient EMG evaluation and follow up with neurology in about 2 weeks?    Thanks,  Saad

## 2023-05-22 NOTE — PROGRESS NOTES
05/22/2023  Micah WALTER Velez Jr    Current provider:  Mike Chauhan MD    Device interrogation:  TXP LVAD INTERROGATIONS 5/22/2023 5/22/2023 5/22/2023 5/21/2023 5/21/2023 5/21/2023 5/21/2023   Type HeartMate3 HeartMate3 HeartMate3 HeartMate3 HeartMate3 HeartMate3 HeartMate3   Flow 4.0 4.1 4.1 4.2 4.3 4.4 4.2   Speed 5200 5200 5200 5200 5200 5200 5250   PI 7.5 7.5 7.5 6.3 6.6 5.6 5.5   Power (Edwards) 3.8 3.7 3.7 3.7 3.7 3.6 3.5   LSL 4800 4800 4800 4800 - - 4800   Pulsatility Pulse Pulse Pulse Pulse - - Pulse          Rounded on Rocco France to ensure all mechanical assist device settings (IABP or VAD) were appropriate and all parameters were within limits.  I was able to ensure all back up equipment was present, the staff had no issues, and the Perfusion Department daily rounding was complete.      For implantable VADs: Interrogation of Ventricular assist device was performed with analysis of device parameters and review of device function. I have personally reviewed the interrogation findings and agree with findings as stated.     In emergency, the nursing units have been notified to contact the perfusion department either by:  Calling [phone] from 630am to 4pm Mon thru Fri, utilizing the On-Call Finder functionality of Epic and searching for Perfusion, or by contacting the hospital  from 4pm to 630am and on weekends and asking to speak with the perfusionist on call.    11:33 AM

## 2023-05-22 NOTE — PROGRESS NOTES
Update    Pt presents in room aaox4 with pleasant affect. No caregivers present at time of visit. Pt voices understanding of plan of care. Pt states he will need to know as early as possible on day of discharge so he can let spouse know to come pick him up. Orders sent to  2000 and to [facility] due to new orders include PT and OT. Pt reports coping well and denies further needs, questions, concerns at this time and none indicated. Providing ongoing psychosocial and counseling support, education, resources, assistance and discharge planning as indicated. Following and available.

## 2023-05-22 NOTE — CONSULTS
[facility]  Neurology  Consult Note    Patient Name: Rocco France  MRN: [id number]  Admission Date: 5/18/2023  Hospital Length of Stay: 4 days  Code Status: Full Code   Attending Provider: Mike Chauhan MD   Consulting Provider: Saad Arnold MD  Primary Care Physician: Jay Guardado DO  Principal Problem:Subdural hematoma, nontraumatic    Inpatient consult to Neurology  Consult performed by: Saad Arnold MD  Consult ordered by: Scottie Corona MD         Subjective:     Chief Complaint:  Reported RUE weakness     HPI:   Neurology is consulted for RUE weakness ni Mr. Rocco France, a pleasant 67 y.o.  male with a PMHx of non-ischemic cardiomyopathy s/p LVAD implantation 1/2021. He was transferred from [facility] for NSGY evaluation of a right subdural hematoma seen on CT head. On Saturday 05/13 around 5am he stood up used the bathroom and went to his chair. He went to grab something on the floor when all of sudden his legs gave out and he slip to the floor. While trying to pick himself up he noticed his R. Sided was weak and had to forced himself up with his Left side. He went to sleep until 9am when he stood up to the bathroom and as he was walking back to the bed he look down and same episode happen. His legs gave out and he fell to the floor. He then called his neighbor to help him stand up. His right arm has been hurting which he was told in the ER at the OSH that he had no fractures. He denied any presyncopal symptoms, confusion, diarrhea or any changes that could have precipitated his symptoms. He has home health who monitor his BP but he does not remember his usual numbers. On arrival to the unit patient was AAOx4 with mild weakness on RUE only plus a intermittent right hand tremor.        Past Medical History:   Diagnosis Date    Acute respiratory failure requiring reintubation 1/28/2022    CLARISSE (acute kidney injury) 1/11/2021    Diabetes  mellitus     Kidney stones        Past Surgical History:   Procedure Laterality Date    CARDIAC CATHETERIZATION  2010    no stents    CARDIAC DEFIBRILLATOR PLACEMENT  2010    CARDIAC DEFIBRILLATOR PLACEMENT      HERNIA REPAIR      IRRIGATION OF MEDIASTINUM N/A 1/14/2021    Procedure: IRRIGATION, MEDIASTINUM;  Surgeon: Zenon Corona MD;  [facility];  Service: Cardiovascular;  Laterality: N/A;    KNEE ARTHROSCOPY Right     LEFT VENTRICULAR ASSIST DEVICE Left 1/13/2021    Procedure: INSERTION- HeartMate 3 LEFT VENTRICULAR ASSIST DEVICE ;  Surgeon: Zenon Corona MD;  [facility];  Service: Cardiovascular;  Laterality: Left;    RECONSTRUCTION OF PERICARDIUM N/A 1/14/2021    Procedure: RECONSTRUCTION, PERICARDIUM;  Surgeon: Zenon Corona MD;  [facility];  Service: Cardiovascular;  Laterality: N/A;    RIGHT HEART CATHETERIZATION Right 11/2/2020    Procedure: INSERTION, CATHETER, RIGHT HEART;  Surgeon: Deana Lake MD;  Location: [facility] CATH LAB;  Service: Cardiology;  Laterality: Right;    RIGHT HEART CATHETERIZATION Right 3/24/2022    Procedure: INSERTION, CATHETER, RIGHT HEART;  Surgeon: Manuel Ramos Jr., MD;  Location: [facility] CATH LAB;  Service: Cardiology;  Laterality: Right;    STERNAL WOUND CLOSURE  1/13/2021    Procedure: TEMPORARY CLOSURE OF CHEST;  Surgeon: Zenon Corona MD;  [address]  Service: Cardiovascular;;    STERNAL WOUND CLOSURE N/A 1/14/2021    Procedure: CLOSURE, WOUND, STERNUM;  Surgeon: Zenon Corona MD;  [facility];  Service: Cardiovascular;  Laterality: N/A;       Review of patient's allergies indicates:   Allergen Reactions    Pcn [penicillins] Hives       No current facility-administered medications on file prior to encounter.     Current Outpatient Medications on File Prior to Encounter   Medication Sig    amLODIPine (NORVASC) 5 MG tablet Take 1 tablet (5 mg total) by mouth once daily.     atorvastatin (LIPITOR) 80 MG tablet Take 1 tablet by mouth once daily    digoxin (LANOXIN) 125 mcg tablet Take 1 tablet (0.125 mg total) by mouth once daily.    docusate sodium (COLACE) 100 MG capsule Take 100 mg by mouth Daily.    ferrous gluconate 324 mg (37.5 mg iron) Tab tablet Take 1 tablet (324 mg total) by mouth daily with breakfast.    furosemide (LASIX) 40 MG tablet Take 1 tablet (40 mg total) by mouth once daily at 6am.    lisinopriL (PRINIVIL,ZESTRIL) 5 MG tablet Take 1 tablet (5 mg total) by mouth once daily.    magnesium oxide (MAG-OX) 400 mg (241.3 mg magnesium) tablet Take 1 tablet (400 mg total) by mouth 3 (three) times daily.    mirtazapine (REMERON) 30 MG tablet Take 1 tablet (30 mg total) by mouth every evening.    SITagliptin (JANUVIA) 100 MG Tab Take 1 tablet (100 mg total) by mouth once daily. (Patient not taking: Reported on 4/5/2023)    spironolactone (ALDACTONE) 25 MG tablet Take 1 tablet (25 mg total) by mouth once daily.    warfarin (COUMADIN) 5 MG tablet Take 1 tablet (5 mg total) by mouth Daily.     Family History       Problem Relation (Age of Onset)    Heart attack Mother, Brother    Heart failure Brother    Hypertension Brother          Tobacco Use    Smoking status: Some Days     Types: Cigarettes    Smokeless tobacco: Never   Substance and Sexual Activity    Alcohol use: No    Drug use: Not on file    Sexual activity: Not on file     Review of Systems   Constitutional:  Negative for chills and fever.   HENT:  Negative for trouble swallowing and voice change.    Eyes:  Negative for photophobia and visual disturbance.   Respiratory:  Negative for chest tightness and shortness of breath.    Gastrointestinal:  Negative for nausea and vomiting.   Musculoskeletal:  Positive for arthralgias and myalgias. Negative for back pain and neck pain.   Neurological:  Positive for weakness. Negative for facial asymmetry.   Objective:     Vital Signs (Most Recent):  Temp: 97.4 °F (36.3  °C) (05/22/23 1124)  Pulse: 86 (05/22/23 1438)  Resp: 18 (05/22/23 1124)  BP: (!) 88/0 (05/22/23 1357)  SpO2: 100 % (05/22/23 1124) Vital Signs (24h Range):  Temp:  [97.4 °F (36.3 °C)-98.8 °F (37.1 °C)] 97.4 °F (36.3 °C)  Pulse:  [81-90] 86  Resp:  [17-20] 18  SpO2:  [97 %-100 %] 100 %  BP: ()/(0-76) 88/0     Weight: 66.7 kg (147 lb)  Body mass index is 18.87 kg/m².     Physical Exam  Vitals reviewed.   HENT:      Head: Normocephalic.      Nose: Nose normal.      Mouth/Throat:      Mouth: Mucous membranes are dry.      Pharynx: Oropharynx is clear.   Eyes:      Conjunctiva/sclera: Conjunctivae normal.   Cardiovascular:      Rate and Rhythm: Normal rate.      Pulses: Normal pulses.   Pulmonary:      Effort: Pulmonary effort is normal.   Abdominal:      General: Abdomen is flat.   Musculoskeletal:         General: No swelling or tenderness. Normal range of motion.      Cervical back: Normal range of motion and neck supple. No rigidity or tenderness.   Skin:     General: Skin is warm and dry.      Capillary Refill: Capillary refill takes less than 2 seconds.   Neurological:      Mental Status: He is alert.      Motor: Weakness present.   Psychiatric:         Mood and Affect: Mood normal.         Thought Content: Thought content normal.     Neurological   MENTAL STATUS: Alert and oriented to person, place, and time. Attention and concentration within normal limits. Speech without dysarthria.  CRANIAL NERVES: Visual fields intact. PERRL. EOMI. Facial sensation intact. Face symmetrical. Hearing grossly intact. Full shoulder shrug bilaterally.   SENSORY: Sensation is intact to light touch throughout.    MOTOR: Normal bulk and tone. No pronator drift.  5/5 deltoid, biceps, triceps, interosseous, hand  bilaterally. 5/5 iliopsoas, knee extension/flexion, foot dorsi/plantarflexion bilaterally.  RUE weakness only noted secondary to pain  REFLEXES: Symmetric and 2+ throughout. Toes down going bilaterally.              Significant Labs:   Recent Lab Results  (Last 5 results in the past 24 hours)        05/22/23  1558   05/22/23  1356   05/22/23  1157   05/22/23  0815   05/22/23  0608        Albumin         2.2       Alkaline Phosphatase         117       ALT         7       Anion Gap         6                6       Ascending aorta   3.15             AST         11       Baso #         0.05       Basophil %         0.5       BILIRUBIN TOTAL         0.7  Comment: For infants and newborns, interpretation of results should be based  on gestational age, weight and in agreement with clinical  observations.    Premature Infant recommended reference ranges:  Up to 24 hours.............<8.0 mg/dL  Up to 48 hours............<12.0 mg/dL  3-5 days..................<15.0 mg/dL  6-29 days.................<15.0 mg/dL         BSA   1.87             BUN         15                15       Calcium         9.6                9.6       Chloride         99                99       CO2         26                26       Creatinine         0.8                0.8       Left Ventricle Relative Wall Thickness   0.23             Differential Method         Automated       E/A ratio   0.90             E/E' ratio   4.60             eGFR         >60.0                >60.0       EF   10             Eos #         0.2       Eosinophil %         2.2       E wave deceleration time   260.15             FS   14             Glucose         104                104       Gran # (ANC)         7.7       Gran %         69.5       Hematocrit         29.0       Hemoglobin         9.0       Immature Grans (Abs)         0.05  Comment: Mild elevation in immature granulocytes is non specific and   can be seen in a variety of conditions including stress response,   acute inflammation, trauma and pregnancy. Correlation with other   laboratory and clinical findings is essential.         Immature Granulocytes         0.5       INR         1.4  Comment: Coumadin Therapy:  2.0 - 3.0  "for INR for all indicators except mechanical heart valves  and antiphospholipid syndromes which should use 2.5 - 3.5.         IVRT   114.18             IVSd   0.51             LA WIDTH   5.09             Left Atrium Major Axis   5.46             Left Atrium Minor Axis   5.18             LA size   3.45             LA volume   79.35                52.24             LA vol index   41.5             LA Volume Index (Mod)   27.4             Left CCA dist donahue 30               Left CCA dist sys 68               Left CCA mid don[place]ue 31               Left CCA mid sys 70               Left CCA prox [place] 31               Left CCA prox sys 72               LVOT area   3.9             Left ECA don[place] 23               Left ECA sys 69               Left CBA don[place] 14               Left CBA sys 39               Left ICA dist don[place]ue 31               Left ICA dist sys 67               Left ICA mid don[place]ue 33               Left ICA mid sys 63               Left ICA prox [place] 28               Left ICA prox sys 53               LV LATERAL E/E' RATIO   3.54             LV SEPTAL E/E' RATIO   6.57             LV EDV BP   226.59             LV Diastolic Volume Index   118.63             Left vertebral donahue 23               Left vertebral sys 38               LVIDd   6.64             LVIDs   5.74             LV mass   171.93             LV Mass Index   90             LVOT diameter   2.23             LV ESV BP   162.70             LV Systolic Volume Index   85.2             Lymph #         1.8       Lymph %         16.7       Magnesium         2.3       MCH         28.2       MCHC         31.0       MCV         91       Mean e'   0.10             Mono #         1.2       Mono %         10.6       MPV         10.1       MV valve area p 1/2 method   2.92             MV "A" wave duration   6.57             MV Peak A Dimitris   0.51             MV Peak E Dimitris   0.46             MV stenosis pressure 1/2 time   75.44             nRBC         0       Right Highest " EDV 30.00               Left ICA/CCA ratio 0.99               Right ICA/CCA ratio 1.03               Left Highest CCA 72               Left Highest ICA 67.00               Right Highest CCA 67               Right Highest ICA 63.00               LT Highest EDV 33.00               Phosphorus         3.1       Platelets         253       POCT Glucose     110   131         Potassium         4.8                4.8       PROTEIN TOTAL         9.1       Protime         14.3       PV Peak D Dimitris   0.38             PV Peak S Dimitris   0.36             Pulm vein S/D ratio   0.95             Posterior Wall   0.78             Right Atrial Pressure (from IVC)   3             RA area   24.00             RA Major Axis   5.60             RA Width   4.92             RBC         3.19       Right CCA dist donahue 27               Right CCA dist sys 61               Right CCA mid don[place]ue 18               Right CCA mid sys 51               Right CCA prox [place] 29               Right CCA prox sys 67               RDW         15.5       Right ECA don[place]ue 8               Right ECA sys 51               Right ICA dist don[place] 30               Right ICA dist sys 58               Right ICA mid don[place]ue 23               Right ICA mid sys 61               Right ICA prox don[place]ue 27               Right ICA prox sys 63               Right CBA don[place]ue 10               Rigth CBA sys 46               RV mid diameter   4.50             RVDD   4.40             Right vertebral don[place]ue 14               Right vertebral sys 29               Sinus   3.27             Sodium         131                131       STJ   2.92             TAPSE   1.11             TDI SEPTAL   0.07             TDI LATERAL   0.13             Triscuspid Valve Regurgitation Peak Gradient   27             TR Max Dimitris   2.61             TV rest pulmonary artery pressure   30             WBC         11.00                            All pertinent lab results from the past 24 hours have been reviewed.    Significant  Imaging: I have reviewed all pertinent imaging results/findings within the past 24 hours.    Assessment and Plan:     Muscle weakness of right upper extremity  Neurology is consulted for RUE weakness ni a 67 M. He also has had a right subdural hematoma seen on CT head. On Saturday 05/13 around 5am he stood up used the bathroom and went to his chair. His right arm has been hurting which he was told in the ER at the OSH that he had no fractures. He denied any presyncopal symptoms, confusion, diarrhea or any changes that could have precipitated his symptoms. On arrival to the unit patient was AAOx4 with mild weakness on RUE only plus a intermittent right hand tremor.     Neuro exam without significant motor deficits, and weakness in RUE related to pain, as patient was able to provide full force briefly. There is concern for spinal process resulting in weakness related to canal stenosis. This will need to be evaluated in the outpatient setting.    While he is here, at the discretion of the primary team, can consider vascular risk factors for which he can be worked up included A1C, LDL, Echo, and vessel imaging such as US of the carotids.    Recommendations  -- referral to neurology for EMG evaluation outpatient  -- LDL, A1C, TSH, Echo, vessel imaging  -- neurology will sign off, please call with questions or concerns      VTE Risk Mitigation (From admission, onward)         Ordered     warfarin (COUMADIN) tablet 2.5 mg  Every Mon, Wed, Fri 05/20/23 0728     warfarin (COUMADIN) tablet 5 mg  Every Tues, Thurs, Sat, Sun         05/20/23 0728     IP VTE HIGH RISK PATIENT  Once         05/18/23 1840     Place sequential compression device  Until discontinued         05/18/23 1840                Thank you for your consult. I will sign off. Please contact us if you have any additional questions.    Saad Arnold MD  Neurology  [facility]

## 2023-05-22 NOTE — SUBJECTIVE & OBJECTIVE
Past Medical History:   Diagnosis Date    Acute respiratory failure requiring reintubation 1/28/2022    CLARISSE (acute kidney injury) 1/11/2021    Diabetes mellitus     Kidney stones        Past Surgical History:   Procedure Laterality Date    CARDIAC CATHETERIZATION  2010    no stents    CARDIAC DEFIBRILLATOR PLACEMENT  2010    CARDIAC DEFIBRILLATOR PLACEMENT      HERNIA REPAIR      IRRIGATION OF MEDIASTINUM N/A 1/14/2021    Procedure: IRRIGATION, MEDIASTINUM;  Surgeon: Zenon Corona MD;  [facility];  Service: Cardiovascular;  Laterality: N/A;    KNEE ARTHROSCOPY Right     LEFT VENTRICULAR ASSIST DEVICE Left 1/13/2021    Procedure: INSERTION- HeartMate 3 LEFT VENTRICULAR ASSIST DEVICE ;  Surgeon: Zenon Corona MD;  [facility];  Service: Cardiovascular;  Laterality: Left;    RECONSTRUCTION OF PERICARDIUM N/A 1/14/2021    Procedure: RECONSTRUCTION, PERICARDIUM;  Surgeon: Zenon Corona MD;  [facility];  Service: Cardiovascular;  Laterality: N/A;    RIGHT HEART CATHETERIZATION Right 11/2/2020    Procedure: INSERTION, CATHETER, RIGHT HEART;  Surgeon: Deana Lake MD;  Location: [facility] CATH LAB;  Service: Cardiology;  Laterality: Right;    RIGHT HEART CATHETERIZATION Right 3/24/2022    Procedure: INSERTION, CATHETER, RIGHT HEART;  Surgeon: Manuel Ramos Jr., MD;  Location: [facility] CATH LAB;  Service: Cardiology;  Laterality: Right;    STERNAL WOUND CLOSURE  1/13/2021    Procedure: TEMPORARY CLOSURE OF CHEST;  Surgeon: Zenon Corona MD;  [facility];  Service: Cardiovascular;;    STERNAL WOUND CLOSURE N/A 1/14/2021    Procedure: CLOSURE, WOUND, STERNUM;  Surgeon: Zenon Corona MD;  [facility];  Service: Cardiovascular;  Laterality: N/A;       Review of patient's allergies indicates:   Allergen Reactions    Pcn [penicillins] Hives       No current facility-administered medications on file prior to encounter.     Current Outpatient Medications on  File Prior to Encounter   Medication Sig    amLODIPine (NORVASC) 5 MG tablet Take 1 tablet (5 mg total) by mouth once daily.    atorvastatin (LIPITOR) 80 MG tablet Take 1 tablet by mouth once daily    digoxin (LANOXIN) 125 mcg tablet Take 1 tablet (0.125 mg total) by mouth once daily.    docusate sodium (COLACE) 100 MG capsule Take 100 mg by mouth Daily.    ferrous gluconate 324 mg (37.5 mg iron) Tab tablet Take 1 tablet (324 mg total) by mouth daily with breakfast.    furosemide (LASIX) 40 MG tablet Take 1 tablet (40 mg total) by mouth once daily at 6am.    lisinopriL (PRINIVIL,ZESTRIL) 5 MG tablet Take 1 tablet (5 mg total) by mouth once daily.    magnesium oxide (MAG-OX) 400 mg (241.3 mg magnesium) tablet Take 1 tablet (400 mg total) by mouth 3 (three) times daily.    mirtazapine (REMERON) 30 MG tablet Take 1 tablet (30 mg total) by mouth every evening.    SITagliptin (JANUVIA) 100 MG Tab Take 1 tablet (100 mg total) by mouth once daily. (Patient not taking: Reported on 4/5/2023)    spironolactone (ALDACTONE) 25 MG tablet Take 1 tablet (25 mg total) by mouth once daily.    warfarin (COUMADIN) 5 MG tablet Take 1 tablet (5 mg total) by mouth Daily.     Family History       Problem Relation (Age of Onset)    Heart attack Mother, Brother    Heart failure Brother    Hypertension Brother          Tobacco Use    Smoking status: Some Days     Types: Cigarettes    Smokeless tobacco: Never   Substance and Sexual Activity    Alcohol use: No    Drug use: Not on file    Sexual activity: Not on file     Review of Systems   Constitutional:  Negative for chills and fever.   HENT:  Negative for trouble swallowing and voice change.    Eyes:  Negative for photophobia and visual disturbance.   Respiratory:  Negative for chest tightness and shortness of breath.    Gastrointestinal:  Negative for nausea and vomiting.   Musculoskeletal:  Positive for arthralgias and myalgias. Negative for back pain and neck pain.   Neurological:   Positive for weakness. Negative for facial asymmetry.   Objective:     Vital Signs (Most Recent):  Temp: 97.4 °F (36.3 °C) (05/22/23 1124)  Pulse: 86 (05/22/23 1438)  Resp: 18 (05/22/23 1124)  BP: (!) 88/0 (05/22/23 1357)  SpO2: 100 % (05/22/23 1124) Vital Signs (24h Range):  Temp:  [97.4 °F (36.3 °C)-98.8 °F (37.1 °C)] 97.4 °F (36.3 °C)  Pulse:  [81-90] 86  Resp:  [17-20] 18  SpO2:  [97 %-100 %] 100 %  BP: ()/(0-76) 88/0     Weight: 66.7 kg (147 lb)  Body mass index is 18.87 kg/m².     Physical Exam  Vitals reviewed.   HENT:      Head: Normocephalic.      Nose: Nose normal.      Mouth/Throat:      Mouth: Mucous membranes are dry.      Pharynx: Oropharynx is clear.   Eyes:      Conjunctiva/sclera: Conjunctivae normal.   Cardiovascular:      Rate and Rhythm: Normal rate.      Pulses: Normal pulses.   Pulmonary:      Effort: Pulmonary effort is normal.   Abdominal:      General: Abdomen is flat.   Musculoskeletal:         General: No swelling or tenderness. Normal range of motion.      Cervical back: Normal range of motion and neck supple. No rigidity or tenderness.   Skin:     General: Skin is warm and dry.      Capillary Refill: Capillary refill takes less than 2 seconds.   Neurological:      Mental Status: He is alert.      Motor: Weakness present.   Psychiatric:         Mood and Affect: Mood normal.         Thought Content: Thought content normal.     Neurological   MENTAL STATUS: Alert and oriented to person, place, and time. Attention and concentration within normal limits. Speech without dysarthria.  CRANIAL NERVES: Visual fields intact. PERRL. EOMI. Facial sensation intact. Face symmetrical. Hearing grossly intact. Full shoulder shrug bilaterally.   SENSORY: Sensation is intact to light touch throughout.    MOTOR: Normal bulk and tone. No pronator drift.  5/5 deltoid, biceps, triceps, interosseous, hand  bilaterally. 5/5 iliopsoas, knee extension/flexion, foot dorsi/plantarflexion bilaterally.  RUE  weakness only noted secondary to pain  REFLEXES: Symmetric and 2+ throughout. Toes down going bilaterally.             Significant Labs:   Recent Lab Results  (Last 5 results in the past 24 hours)        05/22/23  1558   05/22/23  1356   05/22/23  1157   05/22/23  0815   05/22/23  0608        Albumin         2.2       Alkaline Phosphatase         117       ALT         7       Anion Gap         6                6       Ascending aorta   3.15             AST         11       Baso #         0.05       Basophil %         0.5       BILIRUBIN TOTAL         0.7  Comment: For infants and newborns, interpretation of results should be based  on gestational age, weight and in agreement with clinical  observations.    Premature Infant recommended reference ranges:  Up to 24 hours.............<8.0 mg/dL  Up to 48 hours............<12.0 mg/dL  3-5 days..................<15.0 mg/dL  6-29 days.................<15.0 mg/dL         BSA   1.87             BUN         15                15       Calcium         9.6                9.6       Chloride         99                99       CO2         26                26       Creatinine         0.8                0.8       Left Ventricle Relative Wall Thickness   0.23             Differential Method         Automated       E/A ratio   0.90             E/E' ratio   4.60             eGFR         >60.0                >60.0       EF   10             Eos #         0.2       Eosinophil %         2.2       E wave deceleration time   260.15             FS   14             Glucose         104                104       Gran # (ANC)         7.7       Gran %         69.5       Hematocrit         29.0       Hemoglobin         9.0       Immature Grans (Abs)         0.05  Comment: Mild elevation in immature granulocytes is non specific and   can be seen in a variety of conditions including stress response,   acute inflammation, trauma and pregnancy. Correlation with other   laboratory and clinical findings  "is essential.         Immature Granulocytes         0.5       INR         1.4  Comment: Coumadin Therapy:  2.0 - 3.0 for INR for all indicators except mechanical heart valves  and antiphospholipid syndromes which should use 2.5 - 3.5.         IVRT   114.18             IVSd   0.51             LA WIDTH   5.09             Left Atrium Major Axis   5.46             Left Atrium Minor Axis   5.18             LA size   3.45             LA volume   79.35                52.24             LA vol index   41.5             LA Volume Index (Mod)   27.4             Left CCA dist donahue 30               Left CCA dist sys 68               Left CCA mid donahue 31               Left CCA mid sys 70               Left CCA prox donahue 31               Left CCA prox sys 72               LVOT area   3.9             Left ECA donahue 23               Left ECA sys 69               Left CBA donahue 14               Left CBA sys 39               Left ICA dist donahue 31               Left ICA dist sys 67               Left ICA mid donahue 33               Left ICA mid sys 63               Left ICA prox donahue 28               Left ICA prox sys 53               LV LATERAL E/E' RATIO   3.54             LV SEPTAL E/E' RATIO   6.57             LV EDV BP   226.59             LV Diastolic Volume Index   118.63             Left vertebral donahue 23               Left vertebral sys 38               LVIDd   6.64             LVIDs   5.74             LV mass   171.93             LV Mass Index   90             LVOT diameter   2.23             LV ESV BP   162.70             LV Systolic Volume Index   85.2             Lymph #         1.8       Lymph %         16.7       Magnesium         2.3       MCH         28.2       MCHC         31.0       MCV         91       Mean e'   0.10             Mono #         1.2       Mono %         10.6       MPV         10.1       MV valve area p 1/2 method   2.92             MV "A" wave duration   6.57             MV Peak A Dimitris   0.51             MV " Peak E Dimitris   0.46             MV stenosis pressure 1/2 time   75.44             nRBC         0       Right Highest EDV 30.00               Left ICA/CCA ratio 0.99               Right ICA/CCA ratio 1.03               Left Highest CCA 72               Left Highest ICA 67.00               Right Highest CCA 67               Right Highest ICA 63.00               LT Highest EDV 33.00               Phosphorus         3.1       Platelets         253       POCT Glucose     110   131         Potassium         4.8                4.8       PROTEIN TOTAL         9.1       Protime         14.3       PV Peak D Dimitris   0.38             PV Peak S Dimitris   0.36             Pulm vein S/D ratio   0.95             Posterior Wall   0.78             Right Atrial Pressure (from IVC)   3             RA area   24.00             RA Major Axis   5.60             RA Width   4.92             RBC         3.19       Right CCA dist donahue 27               Right CCA dist sys 61               Right CCA mid donahue 18               Right CCA mid sys 51               Right CCA prox donahue 29               Right CCA prox sys 67               RDW         15.5       Right ECA donahue 8               Right ECA sys 51               Right ICA dist donahue 30               Right ICA dist sys 58               Right ICA mid donahue 23               Right ICA mid sys 61               Right ICA prox donahue 27               Right ICA prox sys 63               Right CBA donahue 10               Rigth CBA sys 46               RV mid diameter   4.50             RVDD   4.40             Right vertebral donahue 14               Right vertebral sys 29               Sinus   3.27             Sodium         131                131       STJ   2.92             TAPSE   1.11             TDI SEPTAL   0.07             TDI LATERAL   0.13             Triscuspid Valve Regurgitation Peak Gradient   27             TR Max Dimitris   2.61             TV rest pulmonary artery pressure   30             WBC         11.00                             All pertinent lab results from the past 24 hours have been reviewed.    Significant Imaging: I have reviewed all pertinent imaging results/findings within the past 24 hours.

## 2023-05-22 NOTE — PLAN OF CARE
[facility]   Heart Transplant/[facility] Clinic   [address] [place] [address]   [phone] after hours          [phone] fax     VAD HOME  HEALTH ORDERS      Admit to Home Health    Diagnosis:   Patient Active Problem List   Diagnosis    ICD (implantable cardioverter-defibrillator) in place    NICM (nonischemic cardiomyopathy)    Leg pain, left    Essential hypertension    Type 2 diabetes mellitus without complication    Hyperlipidemia    CAD (coronary artery disease)    Counseling regarding advanced care planning and goals of care    LVAD (left ventricular assist device) present    Acute blood loss anemia    Anticoagulated    Bilateral subdural hematomas    PSVT (paroxysmal supraventricular tachycardia)    Chronic combined systolic and diastolic heart failure    Subarachnoid hemorrhage    Dizziness    Diplopia    Atrial fibrillation    Iron deficiency anemia due to chronic blood loss    Subdural hematoma, nontraumatic    Orthostatic hypotension    Normocytic anemia    Muscle weakness of right upper extremity       Patient is homebound due to:  NYHA Class IV HF. S/P LVAD placement.     Diet: Low Fat, Low cholesterol, 2Gm Na, Coumadin restrictions.    Acitivities: No Swimming, bathing, vacuuming, contact sports.    Fresh implants= Sternal Precautions    Nursing:   SN to complete comprehensive assessment including routine vital signs. Instruct on disease process and s/s of complications to report to MD. Review/verify medication list sent home with the patient at time of discharge  and instruct patient/caregiver as needed. Frequency may be adjusted depending on start of care date.    **LVAD driveline exit site dressing change is to be completed per LVAD patient/caregiver only**.    Notify MD if:  SBP > 120 or < 80;   MAP > 80 or < 65;   HR > 120 or < 60;   Temp > 101;   Weight gain >3lbs in 1 day or 5lbs in 1 week.    LABS:  SN to perform labs: PT/INR per  Coumadin [facility] [phone].   Follow up INR date: 5/26/2023  No Finger Sticks        Physical Therapy to evaluate and treat. Evaluate for home safety and equipment needs; Establish/upgrade home exercise program. Perform / instruct on therapeutic exercises, gait training, transfer training, and Range of Motion.    Occupational Therapy to evaluate and treat. Evaluate home environment for safety and equipment needs. Perform/Instruct on transfers, ADL training, ROM, and therapeutic exercises.        Send initial Home Health orders to [facility] attending physician on call.  Send follow up questions to VAD clinic MD [phone] or [phone].

## 2023-05-22 NOTE — PLAN OF CARE
Problem: Adult Inpatient Plan of Care  Goal: Patient-Specific Goal (Individualized)  Outcome: Ongoing, Progressing  Flowsheets (Taken 5/22/2023 1024)  Anxieties, Fears or Concerns: falling  Individualized Care Needs: use of walker/assistance with walking     Problem: Diabetes Comorbidity  Goal: Blood Glucose Level Within Targeted Range  Outcome: Ongoing, Progressing  Intervention: Monitor and Manage Glycemia  Flowsheets (Taken 5/22/2023 1024)  Glycemic Management: blood glucose monitored     Problem: Adjustment to Device (Ventricular Assist Device)  Goal: Optimal Adjustment to Device  Outcome: Ongoing, Progressing  Intervention: Optimize Psychosocial Response to VAD  Flowsheets (Taken 5/22/2023 1024)  Supportive Measures: active listening utilized  Family/Support System Care: self-care encouraged       Plan of care discussed with patient.  Patient ambulating with standby assist, fall precautions in place. LVAD DP and numbers WNL, smooth LVAD hum. Patient has no complaints of pain. Encouraged pt to use strengthening tool from therapy for strengthening of R arm. Discussed medications and care.  Patient has no questions at this time.

## 2023-05-22 NOTE — ASSESSMENT & PLAN NOTE
Neurology is consulted for RUE weakness ni a 67 M. He also has had a right subdural hematoma seen on CT head. On Saturday 05/13 around 5am he stood up used the bathroom and went to his chair. His right arm has been hurting which he was told in the ER at the OSH that he had no fractures. He denied any presyncopal symptoms, confusion, diarrhea or any changes that could have precipitated his symptoms. On arrival to the unit patient was AAOx4 with mild weakness on RUE only plus a intermittent right hand tremor.     Neuro exam without significant motor deficits, and weakness in RUE related to pain, as patient was able to provide full force briefly. There is concern for spinal process resulting in weakness related to canal stenosis. This will need to be evaluated in the outpatient setting.    While he is here, at the discretion of the primary team, can consider vascular risk factors for which he can be worked up included A1C, LDL, Echo, and vessel imaging such as US of the carotids.    Recommendations  -- referral to neurology for EMG evaluation outpatient  -- LDL, A1C, TSH, Echo, vessel imaging  -- neurology will sign off, please call with questions or concerns

## 2023-05-22 NOTE — HPI
Neurology is consulted for RUE weakness ni Mr. Rocco France, a pleasant 67 y.o.  male with a PMHx of non-ischemic cardiomyopathy s/p LVAD implantation 1/2021. He was transferred from [facility] for NSGY evaluation of a right subdural hematoma seen on CT head. On Saturday 05/13 around 5am he stood up used the bathroom and went to his chair. He went to grab something on the floor when all of sudden his legs gave out and he slip to the floor. While trying to pick himself up he noticed his R. Sided was weak and had to forced himself up with his Left side. He went to sleep until 9am when he stood up to the bathroom and as he was walking back to the bed he look down and same episode happen. His legs gave out and he fell to the floor. He then called his neighbor to help him stand up. His right arm has been hurting which he was told in the ER at the OSH that he had no fractures. He denied any presyncopal symptoms, confusion, diarrhea or any changes that could have precipitated his symptoms. He has home health who monitor his BP but he does not remember his usual numbers. On arrival to the unit patient was AAOx4 with mild weakness on RUE only plus a intermittent right hand tremor.

## 2023-05-22 NOTE — PROGRESS NOTES
[facility] - Cardiology Stepdown  Cardiology  Progress Note    Patient Name: oRcco France  MRN: [id number]  Admission Date: 5/18/2023  Hospital Length of Stay: 4 days  Code Status: Full Code   Attending Physician: Mike Chauhan MD   Expected Discharge Date: 5/22/2023      Subjective / Interval   -NAEO    Objective     Vitals:    05/22/23 0940 05/22/23 1109 05/22/23 1124 05/22/23 1157   BP:   100/71 (!) 88/0   BP Location:       Patient Position:   Sitting    Pulse: 81 84 87    Resp:   18    Temp:   97.4 °F (36.3 °C)    TempSrc:   Oral    SpO2:   100%    Weight:       Height:           I/O last 3 completed shifts:  In: 1500 [P.O.:1500]  Out: 3005 [Urine:3005]  No intake/output data recorded.         Physical Examination: General appearance - alert, well appearing, and in no distress  Mental status - alert, oriented to person, place, and time  Neck - JVD absent  Chest - clear to auscultation, no wheezes, rales or rhonchi, symmetric air entry  Heart - normal rate, regular rhythm, VAD hum  Abdomen - soft, nontender, nondistended, no masses or organomegaly  Neurological - alert, oriented, normal speech, no focal findings or movement disorder noted. RUE 3/5 strength, 5/5 in the LUP, RLE and LLE  Extremities - BLE edema none      Labs  BMP  Lab Results   Component Value Date     (L) 05/22/2023     (L) 05/22/2023    K 4.8 05/22/2023    K 4.8 05/22/2023    CL 99 05/22/2023    CL 99 05/22/2023    CO2 26 05/22/2023    CO2 26 05/22/2023    BUN 15 05/22/2023    BUN 15 05/22/2023    CREATININE 0.8 05/22/2023    CREATININE 0.8 05/22/2023    CALCIUM 9.6 05/22/2023    CALCIUM 9.6 05/22/2023    ANIONGAP 6 (L) 05/22/2023    ANIONGAP 6 (L) 05/22/2023    ESTGFRAFRICA 55.3 (A) 07/13/2022    EGFRNONAA 47.8 (A) 07/13/2022       Lab Results   Component Value Date    WBC 11.00 05/22/2023    HGB 9.0 (L) 05/22/2023    HCT 29.0 (L) 05/22/2023    MCV 91 05/22/2023     05/22/2023         BNP  No results for input(s): BNP,  BNPTRIAGEBLO in the last 168 hours.    Lab Results   Component Value Date    ALT 7 (L) 05/22/2023    AST 11 05/22/2023    ALKPHOS 117 05/22/2023    BILITOT 0.7 05/22/2023       Lab Results   Component Value Date    CHOL 127 04/05/2023    CHOL 129 07/13/2022    CHOL 134 09/22/2021     Lab Results   Component Value Date    HDL 36 (L) 04/05/2023    HDL 47 07/13/2022    HDL 49 09/22/2021     Lab Results   Component Value Date    LDLCALC 76.6 04/05/2023    LDLCALC 65.2 07/13/2022    LDLCALC 72.8 09/22/2021     Lab Results   Component Value Date    TRIG 72 04/05/2023    TRIG 84 07/13/2022    TRIG 61 09/22/2021     Lab Results   Component Value Date    CHOLHDL 28.3 04/05/2023    CHOLHDL 36.4 07/13/2022    CHOLHDL 36.6 09/22/2021       Cardiac studies  Cardiac studies  Results for orders placed during the hospital encounter of 04/05/23    Echo    Interpretation Summary  · There is an LVAD present. Base speed is 5100 RPMs. The pump type is a Heartmate III. The interventricular septum appears midline. The aortic valve opens intermittently.  · The left ventricle is severely enlarged with severely decreased systolic function. The estimated ejection fraction is 20%.  · There is abnormal septal wall motion.  · There is severe left ventricular global hypokinesis.  · Grade I left ventricular diastolic dysfunction.  · Mild right ventricular enlargement with mildly to moderately reduced right ventricular systolic function.  · Mild-to-moderate mitral regurgitation.  · Mild tricuspid regurgitation.  · Intermediate central venous pressure (8 mmHg).  · The estimated PA systolic pressure is 32 mmHg.  · Trivial pericardial effusion.      Results for orders placed or performed during the hospital encounter of 05/18/23   EKG 12-LEAD    Collection Time: 05/18/23  6:46 PM    Narrative    Test Reason : Z95.811,    Vent. Rate : 092 BPM     Atrial Rate : 092 BPM     P-R Int : 000 ms          QRS Dur : 180 ms      QT Int : 502 ms       P-R-T Axes  : 000 050 084 degrees     QTc Int : 620 ms    ARTIFACT REPEAT EKG  Confirmed by Jerrod CAT, Erica ([id number]) on 5/19/2023 11:34:43 AM    Referred By: CARMEN RASHID           Confirmed By:Erica Elder MD             Assessment / Plan:   Mr. Rocco France is a pleasant 67 y.o.  male with a past medical history remarkable for non-ischemic cardiomyopathy status post DT HM3 implantation 1/13/2021. He was transferred today from [facility] for neurosurgical evaluation of a R. Subdural hematoma seen on CT head today. Last INR on records 3.4. Mr. France says on Saturday 05/13 around 5am he stood up used the bathroom and went to his chair. He went to grab something on the floor when all of sudden his legs gave out and he slip to the floor. While trying to pick himself up he noticed his R. Sided was weak and had to forced himself up with his Left side. He went to sleep until 9am when he stood up to the bathroom and as he was walking back to the bed he look down and same episode happen. His legs gave out and he fell to the floor. He then called his neighbor to help him stand up. Denied any head trauma but his R. Arm has been hurting which he was told in the ER at the OSH that he had no fractures. Per records XR read as Right humerus negative. He denied any presyncopal symptoms, confusion, diarrhea or any changes that could have precipitated his symptoms. He has home health who monitor his BP but he does not remember his usual numbers. His wife also noticed a new R. Hand tremor. On arrival to the unit patient was AAOx4 with mild weakness on RUE only plus a intermittent right hand tremor.      Patient had been admitted in the past on 1/27/22 after a syncopal event and was found to have an evolving R. Cerebral acute subdural hematoma plus acute basal cistern subarachnoid hemorrhage. At that time patient was intubated and had a prolonged hospitalization complicated by COVID-19. He was taken off of  aspirin and discharged home 2/17/2022     CT head w/o contrast from OSH read: Small right subdural hematoma. No associated mass effect. No midline shift. Interpretation time 15:45 05/18/2023.      PMHx also remarkable for:   -Acute on chronic systolic/diastolic HF  -CAD  - PAF  - DM2  - Orthostatic hypotension  - HTN        * Subdural hematoma, nontraumatic  -hx of Chronic R subdural hematoma  -Neurosurgery consulted, signed off given no change in size of SDH  -Repeat CT unchanged from previous  -Unable to complete MRI to evaluate R weakness due to VAD  -Pending neurology consult  -PT/OT evaluation  -Decreased INR goal to 1.5-2.0 going forward   -Resumed coumadin  -INR was 3.2 on admit        LVAD (left ventricular assist device) present  -HeartMate 3 Implanted 1/13/21 as DT  -Coumadin held on admit, New goal 1.5-2.0. therapeutic today. (previous INR goal was 2.0-2.5 but decreased this admission).  Home dose of Coumadin was 2.5mg Monday and 5mg Qdaily  -Antiplatelets Not on ASA given SDH  -LDH is stable overall today. Will continue to monitor daily.  -Speed set at 5200 rpm  -Interrogation notable for PI events  -Not listed for OHTx     Procedure: Device Interrogation Including analysis of device parameters  Current Settings: Ventricular Assist Device  Review of device function is stable     TXP LVAD INTERROGATIONS 5/22/2023 5/22/2023 5/22/2023 5/22/2023 5/21/2023 5/21/2023 5/21/2023   Type HeartMate3 HeartMate3 HeartMate3 HeartMate3 HeartMate3 HeartMate3 HeartMate3   Flow 4.1 4.0 4.1 4.1 4.2 4.3 4.4   Speed 5200 5200 5200 5200 5200 5200 5200   PI 7.5 7.5 7.5 7.5 6.3 6.6 5.6   Power (Edwards) 3.7 3.8 3.7 3.7 3.7 3.7 3.6   LSL 4800 4800 4800 4800 4800 - -   Pulsatility - Pulse Pulse Pulse Pulse - -   }       Chronic combined systolic and diastolic heart failure  -NICM  -Last 2D Echo 4/5/23: LVEF 20%, LVEDD 6.48 cm with speed at 5100.  Will repeat echo  -Euvolemic on examination today  -Current diuretic regimen: Lasix  40mg Qdaily  -GDMT with home dose of Lisinopril, Digoxin, Aldactone  -2g Na dietary restriction, 1500 mL fluid restriction, strict I/Os        Muscle weakness of right upper extremity  - CT of cervical spine 5/19 consistent with multilevel degenerative changes.  Mod-sever spinal canal stenosis C2-C5.  Moderate neural foraminal narrowing C3-C4.  CT of head without contrast ordered.   -Unable to complete MRI due to VAD  -PT/OT consulted for evaluation and recommendations      Anticoagulated  -INR 3.2 on admit  -Coumadin held  -INR goal changed to 1.5-2.0  -INR 1.5 today: will resume coumadin         Normocytic anemia  -Hx of iron deficiency and acute blood loss. On chronic anticoagulation.   -Continue supplemental iron     Orthostatic hypotension  -Hx of orthostatic hypotension with falls and lightheadedness, for which his BP meds were reduced dating back to 7/13/2022. -Recent event had been preceeded by standing up and walking to the bathroom then sudden fall when looking down.   - Monitoring BP      Type 2 diabetes mellitus without complication  -A1c 6.5 8 months ago. Keeping on ISS.   -Is on Januvia at home      ICD (implantable cardioverter-defibrillator) in place  -No ICD shocks reported.         Scottie Corona MD  Cardiovascular diseases, PGY-V  Transplant Heart  [facility] - Cardiology Stepdown

## 2023-05-23 ENCOUNTER — TELEPHONE (OUTPATIENT)
Dept: TRANSPLANT | Facility: CLINIC | Age: 68
End: 2023-05-23
Payer: MEDICARE

## 2023-05-23 ENCOUNTER — PATIENT MESSAGE (OUTPATIENT)
Dept: TRANSPLANT | Facility: CLINIC | Age: 68
End: 2023-05-23
Payer: MEDICARE

## 2023-05-23 VITALS
RESPIRATION RATE: 16 BRPM | OXYGEN SATURATION: 99 % | BODY MASS INDEX: 18.98 KG/M2 | HEIGHT: 74 IN | HEART RATE: 79 BPM | SYSTOLIC BLOOD PRESSURE: 78 MMHG | WEIGHT: 147.94 LBS | TEMPERATURE: 96 F

## 2023-05-23 PROBLEM — I95.1 ORTHOSTATIC HYPOTENSION: Status: RESOLVED | Noted: 2023-05-18 | Resolved: 2023-05-23

## 2023-05-23 LAB
ALBUMIN SERPL BCP-MCNC: 2.1 G/DL (ref 3.5–5.2)
ALP SERPL-CCNC: 122 U/L (ref 55–135)
ALT SERPL W/O P-5'-P-CCNC: 8 U/L (ref 10–44)
ANION GAP SERPL CALC-SCNC: 10 MMOL/L (ref 8–16)
ANION GAP SERPL CALC-SCNC: 10 MMOL/L (ref 8–16)
AST SERPL-CCNC: 14 U/L (ref 10–40)
BASOPHILS # BLD AUTO: 0.05 K/UL (ref 0–0.2)
BASOPHILS NFR BLD: 0.4 % (ref 0–1.9)
BILIRUB SERPL-MCNC: 0.6 MG/DL (ref 0.1–1)
BUN SERPL-MCNC: 22 MG/DL (ref 8–23)
BUN SERPL-MCNC: 22 MG/DL (ref 8–23)
CALCIUM SERPL-MCNC: 9.3 MG/DL (ref 8.7–10.5)
CALCIUM SERPL-MCNC: 9.3 MG/DL (ref 8.7–10.5)
CHLORIDE SERPL-SCNC: 100 MMOL/L (ref 95–110)
CHLORIDE SERPL-SCNC: 100 MMOL/L (ref 95–110)
CO2 SERPL-SCNC: 24 MMOL/L (ref 23–29)
CO2 SERPL-SCNC: 24 MMOL/L (ref 23–29)
CREAT SERPL-MCNC: 0.9 MG/DL (ref 0.5–1.4)
CREAT SERPL-MCNC: 0.9 MG/DL (ref 0.5–1.4)
DIFFERENTIAL METHOD: ABNORMAL
EOSINOPHIL # BLD AUTO: 0.2 K/UL (ref 0–0.5)
EOSINOPHIL NFR BLD: 2 % (ref 0–8)
ERYTHROCYTE [DISTWIDTH] IN BLOOD BY AUTOMATED COUNT: 15.8 % (ref 11.5–14.5)
EST. GFR  (NO RACE VARIABLE): >60 ML/MIN/1.73 M^2
EST. GFR  (NO RACE VARIABLE): >60 ML/MIN/1.73 M^2
GLUCOSE SERPL-MCNC: 106 MG/DL (ref 70–110)
GLUCOSE SERPL-MCNC: 106 MG/DL (ref 70–110)
HCT VFR BLD AUTO: 24.4 % (ref 40–54)
HCT VFR BLD AUTO: 30.3 % (ref 40–54)
HGB BLD-MCNC: 7.4 G/DL (ref 14–18)
HGB BLD-MCNC: 9.2 G/DL (ref 14–18)
IMM GRANULOCYTES # BLD AUTO: 0.05 K/UL (ref 0–0.04)
IMM GRANULOCYTES NFR BLD AUTO: 0.4 % (ref 0–0.5)
INR PPP: 1.3 (ref 0.8–1.2)
LDH SERPL L TO P-CCNC: 177 U/L (ref 110–260)
LYMPHOCYTES # BLD AUTO: 2.6 K/UL (ref 1–4.8)
LYMPHOCYTES NFR BLD: 23.3 % (ref 18–48)
MAGNESIUM SERPL-MCNC: 2.3 MG/DL (ref 1.6–2.6)
MCH RBC QN AUTO: 27.4 PG (ref 27–31)
MCHC RBC AUTO-ENTMCNC: 30.3 G/DL (ref 32–36)
MCV RBC AUTO: 90 FL (ref 82–98)
MONOCYTES # BLD AUTO: 1.4 K/UL (ref 0.3–1)
MONOCYTES NFR BLD: 12 % (ref 4–15)
NEUTROPHILS # BLD AUTO: 7 K/UL (ref 1.8–7.7)
NEUTROPHILS NFR BLD: 61.9 % (ref 38–73)
NRBC BLD-RTO: 0 /100 WBC
PHOSPHATE SERPL-MCNC: 3.4 MG/DL (ref 2.7–4.5)
PLATELET # BLD AUTO: 268 K/UL (ref 150–450)
PMV BLD AUTO: 10.9 FL (ref 9.2–12.9)
POCT GLUCOSE: 105 MG/DL (ref 70–110)
POCT GLUCOSE: 129 MG/DL (ref 70–110)
POTASSIUM SERPL-SCNC: 4.7 MMOL/L (ref 3.5–5.1)
POTASSIUM SERPL-SCNC: 4.7 MMOL/L (ref 3.5–5.1)
PROT SERPL-MCNC: 9 G/DL (ref 6–8.4)
PROTHROMBIN TIME: 14.1 SEC (ref 9–12.5)
RBC # BLD AUTO: 2.7 M/UL (ref 4.6–6.2)
SODIUM SERPL-SCNC: 134 MMOL/L (ref 136–145)
SODIUM SERPL-SCNC: 134 MMOL/L (ref 136–145)
WBC # BLD AUTO: 11.35 K/UL (ref 3.9–12.7)

## 2023-05-23 PROCEDURE — 84100 ASSAY OF PHOSPHORUS: CPT | Performed by: STUDENT IN AN ORGANIZED HEALTH CARE EDUCATION/TRAINING PROGRAM

## 2023-05-23 PROCEDURE — 99233 PR SUBSEQUENT HOSPITAL CARE,LEVL III: ICD-10-PCS | Mod: ,,, | Performed by: INTERNAL MEDICINE

## 2023-05-23 PROCEDURE — 85610 PROTHROMBIN TIME: CPT | Performed by: STUDENT IN AN ORGANIZED HEALTH CARE EDUCATION/TRAINING PROGRAM

## 2023-05-23 PROCEDURE — 36415 COLL VENOUS BLD VENIPUNCTURE: CPT | Performed by: STUDENT IN AN ORGANIZED HEALTH CARE EDUCATION/TRAINING PROGRAM

## 2023-05-23 PROCEDURE — 27000248 HC VAD-ADDITIONAL DAY

## 2023-05-23 PROCEDURE — 80053 COMPREHEN METABOLIC PANEL: CPT | Performed by: STUDENT IN AN ORGANIZED HEALTH CARE EDUCATION/TRAINING PROGRAM

## 2023-05-23 PROCEDURE — 93750 PR INTERROGATE VENT ASSIST DEV, IN PERSON, W PHYSICIAN ANALYSIS: ICD-10-PCS | Mod: ,,, | Performed by: INTERNAL MEDICINE

## 2023-05-23 PROCEDURE — 83615 LACTATE (LD) (LDH) ENZYME: CPT | Performed by: STUDENT IN AN ORGANIZED HEALTH CARE EDUCATION/TRAINING PROGRAM

## 2023-05-23 PROCEDURE — 85025 COMPLETE CBC W/AUTO DIFF WBC: CPT | Performed by: STUDENT IN AN ORGANIZED HEALTH CARE EDUCATION/TRAINING PROGRAM

## 2023-05-23 PROCEDURE — 83735 ASSAY OF MAGNESIUM: CPT | Performed by: STUDENT IN AN ORGANIZED HEALTH CARE EDUCATION/TRAINING PROGRAM

## 2023-05-23 PROCEDURE — 93750 INTERROGATION VAD IN PERSON: CPT | Mod: ,,, | Performed by: INTERNAL MEDICINE

## 2023-05-23 PROCEDURE — 25000003 PHARM REV CODE 250: Performed by: STUDENT IN AN ORGANIZED HEALTH CARE EDUCATION/TRAINING PROGRAM

## 2023-05-23 PROCEDURE — 85014 HEMATOCRIT: CPT | Performed by: STUDENT IN AN ORGANIZED HEALTH CARE EDUCATION/TRAINING PROGRAM

## 2023-05-23 PROCEDURE — 99233 SBSQ HOSP IP/OBS HIGH 50: CPT | Mod: ,,, | Performed by: INTERNAL MEDICINE

## 2023-05-23 PROCEDURE — 94761 N-INVAS EAR/PLS OXIMETRY MLT: CPT

## 2023-05-23 PROCEDURE — 85018 HEMOGLOBIN: CPT | Performed by: STUDENT IN AN ORGANIZED HEALTH CARE EDUCATION/TRAINING PROGRAM

## 2023-05-23 RX ORDER — WARFARIN SODIUM 5 MG/1
TABLET ORAL
Qty: 35 TABLET | Refills: 5
Start: 2023-05-23 | End: 2023-05-26 | Stop reason: SDUPTHER

## 2023-05-23 RX ADMIN — SPIRONOLACTONE 25 MG: 25 TABLET, FILM COATED ORAL at 09:05

## 2023-05-23 RX ADMIN — DIGOXIN 0.12 MG: 125 TABLET ORAL at 09:05

## 2023-05-23 RX ADMIN — AMLODIPINE BESYLATE 5 MG: 5 TABLET ORAL at 09:05

## 2023-05-23 RX ADMIN — POLYETHYLENE GLYCOL 3350 17 G: 17 POWDER, FOR SOLUTION ORAL at 09:05

## 2023-05-23 RX ADMIN — FUROSEMIDE 40 MG: 40 TABLET ORAL at 09:05

## 2023-05-23 RX ADMIN — Medication 400 MG: at 03:05

## 2023-05-23 RX ADMIN — Medication 400 MG: at 09:05

## 2023-05-23 RX ADMIN — Medication 324 MG: at 09:05

## 2023-05-23 RX ADMIN — LISINOPRIL 5 MG: 5 TABLET ORAL at 09:05

## 2023-05-23 RX ADMIN — ATORVASTATIN CALCIUM 80 MG: 40 TABLET, FILM COATED ORAL at 09:05

## 2023-05-23 NOTE — PLAN OF CARE
Problem: Adult Inpatient Plan of Care  Goal: Plan of Care Review  Outcome: Ongoing, Progressing  Goal: Patient-Specific Goal (Individualized)  Outcome: Ongoing, Progressing  Goal: Absence of Hospital-Acquired Illness or Injury  Outcome: Ongoing, Progressing  Goal: Optimal Comfort and Wellbeing  Outcome: Ongoing, Progressing  Goal: Readiness for Transition of Care  Outcome: Ongoing, Progressing     Problem: Diabetes Comorbidity  Goal: Blood Glucose Level Within Targeted Range  Outcome: Ongoing, Progressing     Problem: Fall Injury Risk  Goal: Absence of Fall and Fall-Related Injury  Outcome: Ongoing, Progressing     Problem: Adjustment to Device (Ventricular Assist Device)  Goal: Optimal Adjustment to Device  Outcome: Ongoing, Progressing     Problem: Bleeding (Ventricular Assist Device)  Goal: Absence of Bleeding  Outcome: Ongoing, Progressing     Problem: Embolism (Ventricular Assist Device)  Goal: Absence of Embolism Signs and Symptoms  Outcome: Ongoing, Progressing     Problem: Hemodynamic Instability (Ventricular Assist Device)  Goal: Optimal Blood Flow  Outcome: Ongoing, Progressing     Problem: Infection (Ventricular Assist Device)  Goal: Absence of Infection Signs and Symptoms  Outcome: Ongoing, Progressing     Problem: Right-Sided Heart Compromise (Ventricular Assist Device)  Goal: Effective Right-Sided Heart Function  Outcome: Ongoing, Progressing

## 2023-05-23 NOTE — DISCHARGE SUMMARY
[facility] - Cardiology Stepdown  Heart Transplant Service  Discharge Summary      Patient Name: Rocco France  MRN: [id number]  Admission Date: 5/18/2023  Hospital Length of Stay: 5 days  Discharge Date and Time:  05/23/2023 1:11 PM  Attending Physician: Pete Baker MD  Discharging Provider: Scottie Corona MD  Primary Care Physician: Jay Guardado DO    HPI:   Mr. Rocco France is a pleasant 67 y.o.  male with a past medical history remarkable for non-ischemic cardiomyopathy status post DT HM3 implantation 1/13/2021. He was transferred today from [facility] for neurosurgical evaluation of a R. Subdural hematoma seen on CT head today. Last INR on records 3.4. Mr. France says on Saturday 05/13 around 5am he stood up used the bathroom and went to his chair. He went to grab something on the floor when all of sudden his legs gave out and he slip to the floor. While trying to pick himself up he noticed his R. Sided was weak and had to forced himself up with his Left side. He went to sleep until 9am when he stood up to the bathroom and as he was walking back to the bed he look down and same episode happen. His legs gave out and he fell to the floor. He then called his neighbor to help him stand up. Denied any head trauma but his R. Arm has been hurting which he was told in the ER at the OSH that he had no fractures. Per records XR read as Right humerus negative. He denied any presyncopal symptoms, confusion, diarrhea or any changes that could have precipitated his symptoms. He has home health who monitor his BP but he does not remember his usual numbers. His wife also noticed a new R. Hand tremor. On arrival to the unit patient was AAOx4 with mild weakness on RUE only plus a intermittent right hand tremor. He was admitted for further evaluation with CT, neurosx and neurology. Of note, patient had been admitted in the past on 1/27/22 after a syncopal event and was found to have  an evolving R. Cerebral acute subdural hematoma plus acute basal cistern subarachnoid hemorrhage. At that time patient was intubated and had a prolonged hospitalization complicated by COVID-19. He was taken off of aspirin and discharged home 2/17/2022       * No surgery found *     Indwelling Lines/Drains at time of discharge:  Lines/Drains/Airways       Line  Duration                  VAD 01/13/21 1211 Left ventricular assist device HeartMate 3 860 days                    Hospital Course (CT showed stable subacute SDH, neurosx and neurology evaluated the patient and he was recommended to have a EMG as an outpatient as there was no evidence of an acute ischemic event and there was no new acute SDH.    Consults:   Consults (From admission, onward)          Status Ordering Provider     Inpatient consult to Neurology  Once        Provider:  (Not yet assigned)    Completed YEMI WHEAT     Inpatient consult to Neurosurgery  Once        Provider:  Patricia Cortez MD    Completed CAPRICE ANDERSON            Significant Diagnostic Studies: N/A    Pending Diagnostic Studies:       None            Final Active Diagnoses:    Diagnosis Date Noted POA    PRINCIPAL PROBLEM:  Subdural hematoma, nontraumatic [I62.00] 05/18/2023 Yes    Muscle weakness of right upper extremity [M62.81] 05/20/2023 Yes    Normocytic anemia [D64.9] 05/18/2023 Yes    Chronic combined systolic and diastolic heart failure [I50.42]  Yes    Anticoagulated [Z79.01] 01/27/2021 Not Applicable    LVAD (left ventricular assist device) present [Z95.811] 01/13/2021 Not Applicable    Type 2 diabetes mellitus without complication [E11.9] 11/17/2015 Yes    ICD (implantable cardioverter-defibrillator) in place [Z95.810] 09/25/2015 Yes      Problems Resolved During this Admission:    Diagnosis Date Noted Date Resolved POA    Orthostatic hypotension [I95.1] 05/18/2023 05/23/2023 Yes       Discharged Condition: fair    Follow Up:    Patient Instructions:       Diet Cardiac     Medications:  Reconciled Home Medications:      Medication List        CHANGE how you take these medications      warfarin 5 MG tablet  Commonly known as: COUMADIN  Take 1 tablet (5mg) by mouth daily, except for a half tablet (2.5mg) on Mondays and Fridays  What changed:   how much to take  how to take this  when to take this  additional instructions            CONTINUE taking these medications      amLODIPine 5 MG tablet  Commonly known as: NORVASC  Take 1 tablet (5 mg total) by mouth once daily.     atorvastatin 80 MG tablet  Commonly known as: LIPITOR  Take 1 tablet by mouth once daily     digoxin 125 mcg tablet  Commonly known as: LANOXIN  Take 1 tablet (0.125 mg total) by mouth once daily.     docusate sodium 100 MG capsule  Commonly known as: COLACE  Take 100 mg by mouth Daily.     ferrous gluconate 324 mg (37.5 mg iron) Tab tablet  Take 1 tablet (324 mg total) by mouth daily with breakfast.     furosemide 40 MG tablet  Commonly known as: LASIX  Take 1 tablet (40 mg total) by mouth once daily at 6am.     lisinopriL 5 MG tablet  Commonly known as: PRINIVIL,ZESTRIL  Take 1 tablet (5 mg total) by mouth once daily.     magnesium oxide 400 mg (241.3 mg magnesium) tablet  Commonly known as: MAG-OX  Take 1 tablet (400 mg total) by mouth 3 (three) times daily.     mirtazapine 30 MG tablet  Commonly known as: REMERON  Take 1 tablet (30 mg total) by mouth every evening.     spironolactone 25 MG tablet  Commonly known as: ALDACTONE  Take 1 tablet (25 mg total) by mouth once daily.            STOP taking these medications      SITagliptin phosphate 100 MG Tab  Commonly known as: WALEUVIA              Time spent on the discharge of patient: 35 minutes    Scottie Corona MD  Cardiology Fellow, PGY-IV  [facility] - Cardiology Stepdown

## 2023-05-23 NOTE — PROGRESS NOTES
[facility] - Cardiology Stepdown  Cardiology  Progress Note    Patient Name: Rocco France  MRN: [id number]  Admission Date: 5/18/2023  Hospital Length of Stay: 5 days  Code Status: Full Code   Attending Physician: Pete Baker MD   Expected Discharge Date: 5/23/2023      Subjective / Interval   -NAEO  -Pending discharge today    Objective     Vitals:    05/23/23 0802 05/23/23 0922 05/23/23 1058 05/23/23 1208   BP:  (!) 84/0  106/66   BP Location:  Left arm  Left arm   Patient Position:  Sitting  Sitting   Pulse:  80 93 66   Resp:  17  16   Temp:  98 °F (36.7 °C)  96.1 °F (35.6 °C)   TempSrc:  Oral  Oral   SpO2:  98%  99%   Weight: 67.1 kg (147 lb 14.9 oz)      Height:           I/O last 3 completed shifts:  In: 1662 [P.O.:1662]  Out: 2900 [Urine:2900]  No intake/output data recorded.         Physical Examination: General appearance - alert, well appearing, and in no distress  Mental status - alert, oriented to person, place, and time  Neck - JVD absent  Chest - clear to auscultation, no wheezes, rales or rhonchi, symmetric air entry  Heart - normal rate, regular rhythm, VAD hum  Abdomen - soft, nontender, nondistended, no masses or organomegaly  Neurological - alert, oriented, normal speech, no focal findings or movement disorder noted. RUE 3/5 strength, 5/5 in the LUP, RLE and LLE  Extremities - BLE edema none      Labs  BMP  Lab Results   Component Value Date     (L) 05/23/2023     (L) 05/23/2023    K 4.7 05/23/2023    K 4.7 05/23/2023     05/23/2023     05/23/2023    CO2 24 05/23/2023    CO2 24 05/23/2023    BUN 22 05/23/2023    BUN 22 05/23/2023    CREATININE 0.9 05/23/2023    CREATININE 0.9 05/23/2023    CALCIUM 9.3 05/23/2023    CALCIUM 9.3 05/23/2023    ANIONGAP 10 05/23/2023    ANIONGAP 10 05/23/2023    ESTGFRAFRICA 55.3 (A) 07/13/2022    EGFRNONAA 47.8 (A) 07/13/2022       Lab Results   Component Value Date    WBC 11.35 05/23/2023    HGB 9.2 (L) 05/23/2023    HCT 30.3 (L)  05/23/2023    MCV 90 05/23/2023     05/23/2023         BNP  No results for input(s): BNP, BNPTRIAGEBLO in the last 168 hours.    Lab Results   Component Value Date    ALT 8 (L) 05/23/2023    AST 14 05/23/2023    ALKPHOS 122 05/23/2023    BILITOT 0.6 05/23/2023       Lab Results   Component Value Date    CHOL 127 04/05/2023    CHOL 129 07/13/2022    CHOL 134 09/22/2021     Lab Results   Component Value Date    HDL 36 (L) 04/05/2023    HDL 47 07/13/2022    HDL 49 09/22/2021     Lab Results   Component Value Date    LDLCALC 76.6 04/05/2023    LDLCALC 65.2 07/13/2022    LDLCALC 72.8 09/22/2021     Lab Results   Component Value Date    TRIG 72 04/05/2023    TRIG 84 07/13/2022    TRIG 61 09/22/2021     Lab Results   Component Value Date    CHOLHDL 28.3 04/05/2023    CHOLHDL 36.4 07/13/2022    CHOLHDL 36.6 09/22/2021       Cardiac studies  Cardiac studies  Results for orders placed during the hospital encounter of 04/05/23    Echo    Interpretation Summary  · There is an LVAD present. Base speed is 5100 RPMs. The pump type is a Heartmate III. The interventricular septum appears midline. The aortic valve opens intermittently.  · The left ventricle is severely enlarged with severely decreased systolic function. The estimated ejection fraction is 20%.  · There is abnormal septal wall motion.  · There is severe left ventricular global hypokinesis.  · Grade I left ventricular diastolic dysfunction.  · Mild right ventricular enlargement with mildly to moderately reduced right ventricular systolic function.  · Mild-to-moderate mitral regurgitation.  · Mild tricuspid regurgitation.  · Intermediate central venous pressure (8 mmHg).  · The estimated PA systolic pressure is 32 mmHg.  · Trivial pericardial effusion.      Results for orders placed or performed during the hospital encounter of 05/18/23   EKG 12-LEAD    Collection Time: 05/18/23  6:46 PM    Narrative    Test Reason : Z95.811,    Vent. Rate : 092 BPM     Atrial  Rate : 092 BPM     P-R Int : 000 ms          QRS Dur : 180 ms      QT Int : 502 ms       P-R-T Axes : 000 050 084 degrees     QTc Int : 620 ms    ARTIFACT REPEAT EKG  Confirmed by Erica Elder MD ([id number]) on 5/19/2023 11:34:43 AM    Referred By: CARMEN RASHID           Confirmed By:Erica Elder MD             Assessment / Plan:   Mr. Rocco France is a pleasant 67 y.o.  male with a past medical history remarkable for non-ischemic cardiomyopathy status post DT HM3 implantation 1/13/2021. He was transferred today from [facility] for neurosurgical evaluation of a R. Subdural hematoma seen on CT head today. Last INR on records 3.4. Mr. France says on Saturday 05/13 around 5am he stood up used the bathroom and went to his chair. He went to grab something on the floor when all of sudden his legs gave out and he slip to the floor. While trying to pick himself up he noticed his R. Sided was weak and had to forced himself up with his Left side. He went to sleep until 9am when he stood up to the bathroom and as he was walking back to the bed he look down and same episode happen. His legs gave out and he fell to the floor. He then called his neighbor to help him stand up. Denied any head trauma but his R. Arm has been hurting which he was told in the ER at the OSH that he had no fractures. Per records XR read as Right humerus negative. He denied any presyncopal symptoms, confusion, diarrhea or any changes that could have precipitated his symptoms. He has home health who monitor his BP but he does not remember his usual numbers. His wife also noticed a new R. Hand tremor. On arrival to the unit patient was AAOx4 with mild weakness on RUE only plus a intermittent right hand tremor.      Patient had been admitted in the past on 1/27/22 after a syncopal event and was found to have an evolving R. Cerebral acute subdural hematoma plus acute basal cistern subarachnoid hemorrhage. At that time  patient was intubated and had a prolonged hospitalization complicated by COVID-19. He was taken off of aspirin and discharged home 2/17/2022     CT head w/o contrast from OSH read: Small right subdural hematoma. No associated mass effect. No midline shift. Interpretation time 15:45 05/18/2023.      PMHx also remarkable for:   -Acute on chronic systolic/diastolic HF  -CAD  - PAF  - DM2  - Orthostatic hypotension  - HTN        * Subdural hematoma, nontraumatic  -hx of Chronic R subdural hematoma  -Neurosurgery consulted, signed off given no change in size of SDH  -Repeat CT unchanged from previous  -Unable to complete MRI to evaluate R weakness due to VAD     -Neurology recommended to have EMG as outpatient  -PT/OT  -Decreased INR goal to 1.5-2.0 going forward   -Resumed coumadin        LVAD (left ventricular assist device) present  -HeartMate 3 Implanted 1/13/21 as DT  -Coumadin held on admit, New goal 1.5-2.0. therapeutic today. (previous INR goal was 2.0-2.5 but decreased this admission).  Home dose of Coumadin was 2.5mg Monday and 5mg Qdaily  -Antiplatelets Not on ASA given SDH  -LDH is stable overall today. Will continue to monitor daily.  -Speed set at 5200 rpm  -Interrogation notable for PI events  -Not listed for OHTx     Procedure: Device Interrogation Including analysis of device parameters  Current Settings: Ventricular Assist Device  Review of device function is stable     TXP LVAD INTERROGATIONS 5/23/2023 5/23/2023 5/22/2023 5/22/2023 5/22/2023 5/22/2023 5/22/2023   Type - HeartMate3 HeartMate3 HeartMate3 HeartMate3 HeartMate3 HeartMate3   Flow 4.5 4.4 4.3 4.7 4.2 4.1 4.0   Speed 5200 5200 5200 5200 5200 5200 5200   PI 4.8 5.6 6.4 4.8 7.3 7.5 7.5   Power (Edwards) 3.6 3.7 3.7 3.8 3.8 3.7 3.8   LSL - .4800 4800 4800 - 4800 4800   Pulsatility - Pulse Pulse Pulse - - Pulse     }       Chronic combined systolic and diastolic heart failure  -NICM  -Last 2D Echo 4/5/23: LVEF 20%, LVEDD 6.48 cm with speed at 5100.   Will repeat echo  -Euvolemic on examination today  -Current diuretic regimen: Lasix 40mg Qdaily  -GDMT with home dose of Lisinopril, Digoxin, Aldactone  -2g Na dietary restriction, 1500 mL fluid restriction, strict I/Os        Muscle weakness of right upper extremity  - CT of cervical spine 5/19 consistent with multilevel degenerative changes.  Mod-sever spinal canal stenosis C2-C5.  Moderate neural foraminal narrowing C3-C4.  CT of head without contrast ordered.   -Unable to complete MRI due to VAD  -PT/OT consulted for evaluation and recommendations      Anticoagulated  -INR 3.2 on admit  -Coumadin held  -INR goal changed to 1.5-2.0  -INR 1.5 today: will resume coumadin         Normocytic anemia  -Hx of iron deficiency and acute blood loss. On chronic anticoagulation.   -Continue supplemental iron     Orthostatic hypotension  -Hx of orthostatic hypotension with falls and lightheadedness, for which his BP meds were reduced dating back to 7/13/2022. -Recent event had been preceeded by standing up and walking to the bathroom then sudden fall when looking down.   - Monitoring BP      Type 2 diabetes mellitus without complication  -A1c 6.5 8 months ago. Keeping on ISS.   -Is on Januvia at home      ICD (implantable cardioverter-defibrillator) in place  -No ICD shocks reported.         Scottie Corona MD  Cardiovascular diseases, PGY-V  Transplant Heart  [facility] - Cardiology Stepdown

## 2023-05-23 NOTE — NURSING
INR 1.9; w/in therapeutic range of 1.5-2.0  
LVAD coordinator contacted on behalf of monitor alert of backup battery replace soon.  Mariela LVAD coordinator aware.  Clinical details specify replacing within 4 months.  Pt performed own dressing change.  Tolerates well, in no apparent distress.   
Patient is ready for discharge. Patient stable alert and oriented. IVs removed. No complaints of pain. Discussed discharge plan. Reviewed medications and side effects, appointments, and answered questions with patient. AVS reviewed and given to pt.      
Patient stepped down to the unit from ICU to room 310. Telemetry box applied and vital signs documented in flow sheet.Oriented to room, call bell with in reach, bed is in low lock position. Plan of care initiated.   
no

## 2023-05-23 NOTE — PROGRESS NOTES
Visited patient at bedside. Patient is ready for discharge at this time. No needs at this time, will continue to monitor.

## 2023-05-23 NOTE — PROGRESS NOTES
Discharge    Pt presents in room aaox4 with open affect. Pt seated in chair. No caregivers present at time of discharge. Pt voices agreement with plan to discharge to home today with [facility], ph [phone], fax [phone]. Pt's spouse will transport pt home. Pt reports coping well and denies further needs, questions, concerns at this time and none indicated. Providing psychosocial and counseling support, education, resources, assistance and discharge planning as indicated. SW remains available.

## 2023-05-23 NOTE — PROGRESS NOTES
DISCHARGE NOTE:    Rocco France is a 67 y.o. male s/p Heartmate3 VAD. Admitted for neurosurgical evaluation of a R. subdural hematoma and weakness in R forearm/hand after a fall event at home.    Past Medical History:   Diagnosis Date    Acute respiratory failure requiring reintubation 1/28/2022    CLARISSE (acute kidney injury) 1/11/2021    Diabetes mellitus     Kidney stones        Hospital Course: Neurology was consulted for nontraumatic subdural hematoma, CTH was found to be stable.     Pharmacy Interventions/Recommendations:  1) INR Goal: Reduced to 1.5 - 2     2) Antiplatelet Agents: Continue off aspirin    3) Heparin Bridging:  N/A    4) INR Follow-Up/Discharge Needs: Repeat INR in one week    See list of discharge medication for dosing instructions.     Rocco France and his caregiver verbalized their understanding and had the opportunity to ask questions.      Discharge Medications:     Medication List        CHANGE how you take these medications      warfarin 5 MG tablet  Commonly known as: COUMADIN  Take 1 tablet (5mg) by mouth daily, except for a half tablet (2.5mg) on Mondays and Fridays  What changed:   how much to take  how to take this  when to take this  additional instructions            CONTINUE taking these medications      amLODIPine 5 MG tablet  Commonly known as: NORVASC  Take 1 tablet (5 mg total) by mouth once daily.     atorvastatin 80 MG tablet  Commonly known as: LIPITOR  Take 1 tablet by mouth once daily     digoxin 125 mcg tablet  Commonly known as: LANOXIN  Take 1 tablet (0.125 mg total) by mouth once daily.     docusate sodium 100 MG capsule  Commonly known as: COLACE     ferrous gluconate 324 mg (37.5 mg iron) Tab tablet  Take 1 tablet (324 mg total) by mouth daily with breakfast.     furosemide 40 MG tablet  Commonly known as: LASIX  Take 1 tablet (40 mg total) by mouth once daily at 6am.     lisinopriL 5 MG tablet  Commonly known as: PRINIVIL,ZESTRIL  Take 1 tablet (5 mg total) by  mouth once daily.     magnesium oxide 400 mg (241.3 mg magnesium) tablet  Commonly known as: MAG-OX  Take 1 tablet (400 mg total) by mouth 3 (three) times daily.     mirtazapine 30 MG tablet  Commonly known as: REMERON  Take 1 tablet (30 mg total) by mouth every evening.     spironolactone 25 MG tablet  Commonly known as: ALDACTONE  Take 1 tablet (25 mg total) by mouth once daily.            STOP taking these medications      SITagliptin phosphate 100 MG Tab  Commonly known as: KIMBERLI               Where to Get Your Medications        Information about where to get these medications is not yet available    Ask your nurse or doctor about these medications  warfarin 5 MG tablet

## 2023-05-23 NOTE — ASSESSMENT & PLAN NOTE
-    More than 50 percent of the care dominated counseling and coordinating care with different team members. The VAD was interrogated and no significant findings were found in the history. All these findings are documented in the note above.

## 2023-05-23 NOTE — PROGRESS NOTES
05/23/2023  Claritza Miguel    Current provider:  Pete Baker MD    Device interrogation:  TXP LVAD INTERROGATIONS 5/23/2023 5/23/2023 5/22/2023 5/22/2023 5/22/2023 5/22/2023 5/22/2023   Type - HeartMate3 HeartMate3 HeartMate3 HeartMate3 HeartMate3 HeartMate3   Flow 4.5 4.4 4.3 4.7 4.2 4.1 4.0   Speed 5200 5200 5200 5200 5200 5200 5200   PI 4.8 5.6 6.4 4.8 7.3 7.5 7.5   Power (Edwards) 3.6 3.7 3.7 3.8 3.8 3.7 3.8   LSL - .4800 4800 4800 - 4800 4800   Pulsatility - Pulse Pulse Pulse - - Pulse          Rounded on Rocco France to ensure all mechanical assist device settings (IABP or VAD) were appropriate and all parameters were within limits.  I was able to ensure all back up equipment was present, the staff had no issues, and the Perfusion Department daily rounding was complete.      For implantable VADs: Interrogation of Ventricular assist device was performed with analysis of device parameters and review of device function. I have personally reviewed the interrogation findings and agree with findings as stated.     In emergency, the nursing units have been notified to contact the perfusion department either by:  Calling [id number] from 630am to 4pm Mon thru Fri, utilizing the On-Call Finder functionality of Epic and searching for Perfusion, or by contacting the hospital  from 4pm to 630am and on weekends and asking to speak with the perfusionist on call.    9:47 AM

## 2023-05-24 ENCOUNTER — PATIENT OUTREACH (OUTPATIENT)
Dept: ADMINISTRATIVE | Facility: CLINIC | Age: 68
End: 2023-05-24
Payer: MEDICARE

## 2023-05-24 NOTE — PROGRESS NOTES
Spoke to patient regarding discharge medication dosing. Patient verified a discharge warfarin dose of 2.5mg every M/F, and 5mg all other days. INR scheduled for 05.29.23. Orders faxed to [facility].

## 2023-05-24 NOTE — PROGRESS NOTES
Admitted 5/18-5/23. Hospital course: Rocco France is a 67 y.o. male s/p Heartmate3 VAD. Admitted for neurosurgical evaluation of a R. subdural hematoma and weakness in R forearm/hand after a fall event at home. Neurology was consulted for nontraumatic subdural hematoma, CTH was found to be stable.

## 2023-05-24 NOTE — PROGRESS NOTES
C3 nurse attempted to contact Rocco France  for a TCC post hospital discharge follow up call. No answer. The patient does not have a scheduled HOSFU appointment, and the pt does not have an [facility] PCP.

## 2023-05-25 ENCOUNTER — TELEPHONE (OUTPATIENT)
Dept: TRANSPLANT | Facility: CLINIC | Age: 68
End: 2023-05-25
Payer: MEDICARE

## 2023-05-25 RX ORDER — LANOLIN ALCOHOL/MO/W.PET/CERES
1 CREAM (GRAM) TOPICAL 3 TIMES DAILY
Qty: 90 TABLET | Refills: 11 | Status: CANCELLED | OUTPATIENT
Start: 2023-05-25

## 2023-05-25 NOTE — TELEPHONE ENCOUNTER
----- Message from Gabrielle Garcia sent at 5/25/2023 12:29 PM CDT -----  Regarding: RE: Rx Refill  Please call, Fransico Hutton Care Giver @ [phone]. Thank you  ----- Message -----  From: Mariela Barrow RN  Sent: 5/25/2023  12:24 PM CDT  To: Bernardkarlee Radha  Subject: RE: Rx Refill                                    I returned this call to all numbers in this message and no one has any idea why I was calling so if you have more context or different numbers that would be appreciated  ----- Message -----  From: Bernardkarlee Radha  Sent: 5/25/2023  12:09 PM CDT  To: [facility] Lvad Clinical  Subject: Rx Refill                                        2nd time Calling     Holistic:less than 90   Blood pressure readings:   87/60   85/55   82/56   84/50     amLODIPine (NORVASC) 5 MG tablet     lisinopriL (PRINIVIL,ZESTRIL) 5 MG tablet     atorvastatin (LIPITOR) 80 MG tablet     digoxin (LANOXIN) 125 mcg tablet     docusate sodium (COLACE) 100 MG capsule     ferrous gluconate 324 mg (37.5 mg iron) Tab tablet     magnesium oxide (MAG-OX) 400 mg (241.3 mg magnesium) tablet     mirtazapine (REMERON) 30 MG tablet     spironolactone (ALDACTONE) 25 MG tablet     [address]   Phone: [phone]   Fax: [phone]       Fransico Valentine [phone]

## 2023-05-25 NOTE — TELEPHONE ENCOUNTER
----- Message from Gabrielle Garcia sent at 5/24/2023 10:16 AM CDT -----  Regarding: Rx Refills & Blood Pressure Readings  Holistic:less than 90  Blood pressure readings:  87/60  85/55  82/56  84/50    amLODIPine (NORVASC) 5 MG tablet    lisinopriL (PRINIVIL,ZESTRIL) 5 MG tablet    atorvastatin (LIPITOR) 80 MG tablet    digoxin (LANOXIN) 125 mcg tablet    docusate sodium (COLACE) 100 MG capsule    ferrous gluconate 324 mg (37.5 mg iron) Tab tablet    magnesium oxide (MAG-OX) 400 mg (241.3 mg magnesium) tablet    mirtazapine (REMERON) 30 MG tablet    spironolactone (ALDACTONE) 25 MG tablet    [address]   Phone:  [phone]  Fax:  [phone]      Fransico Valentine [phone]  Thanks

## 2023-05-25 NOTE — PROGRESS NOTES
C3 nurse spoke with Rocco France  for a TCC post hospital discharge follow up call. The patient reports does not have a scheduled HOSFU appointment. C3 nurse was unable to schedule HOSFU appointment [facility]. Patient advised to contact their PCP to schedule a HOSPFU within 5-7 days.

## 2023-05-25 NOTE — TELEPHONE ENCOUNTER
Was able to speak with Teresa, caregiver, and Meme, wife regarding medications. They report the pateint does not have any of medications listed at home however patient's BP is low (MAPs in the 60s).    Systolic:less than 90   Blood pressure readings:   87/60   85/55   82/56   84/50     Consulted with Dr. Lake who ordered to restart digoxin, colace, iron. Recheck Dig levels, mag, and lipid levels. Hold amlodipine, lisinopril, spironolactone- recheck BP tonight, tomorrow morning and tomorrow afternoon and call with BP to determine if a BP med should be restarted. Instructed to get remeron from PCP. Below is list of medications reported out of:      amLODIPine (NORVASC) 5 MG tablet     lisinopriL (PRINIVIL,ZESTRIL) 5 MG tablet     atorvastatin (LIPITOR) 80 MG tablet     digoxin (LANOXIN) 125 mcg tablet     docusate sodium (COLACE) 100 MG capsule     ferrous gluconate 324 mg (37.5 mg iron) Tab tablet     magnesium oxide (MAG-OX) 400 mg (241.3 mg magnesium) tablet     mirtazapine (REMERON) 30 MG tablet     spironolactone (ALDACTONE) 25 MG tablet     Refills to be sent to pharmacy by MA.

## 2023-05-25 NOTE — TELEPHONE ENCOUNTER
Returned phone call to patient and family; patient was sent home with hospital VAD equipment; advised to keep equipment plugged in and be brought to clinic at earliest convenience or appointment. Wife evan verbalized understanding and agreement with plan.  ----- Message from Kya Sanders sent at 5/25/2023  8:28 AM CDT -----  Regarding: equipment  Pls call pt at [phone].  He was d/c from the hospital on Tuesday and says he has some equipment he needs to return and needs to know how it has to be done.    Thank you

## 2023-05-26 ENCOUNTER — TELEPHONE (OUTPATIENT)
Dept: TRANSPLANT | Facility: CLINIC | Age: 68
End: 2023-05-26
Payer: MEDICARE

## 2023-05-26 ENCOUNTER — PATIENT MESSAGE (OUTPATIENT)
Dept: CARDIOTHORACIC SURGERY | Facility: CLINIC | Age: 68
End: 2023-05-26
Payer: MEDICARE

## 2023-05-26 DIAGNOSIS — Z95.811 LVAD (LEFT VENTRICULAR ASSIST DEVICE) PRESENT: ICD-10-CM

## 2023-05-26 RX ORDER — WARFARIN SODIUM 5 MG/1
TABLET ORAL
Qty: 30 TABLET | Refills: 5 | Status: SHIPPED | OUTPATIENT
Start: 2023-05-26 | End: 2024-01-04 | Stop reason: SDUPTHER

## 2023-05-26 RX ORDER — LANOLIN ALCOHOL/MO/W.PET/CERES
1 CREAM (GRAM) TOPICAL 3 TIMES DAILY
Qty: 90 TABLET | Refills: 11 | Status: SHIPPED | OUTPATIENT
Start: 2023-05-26

## 2023-05-26 RX ORDER — WARFARIN SODIUM 5 MG/1
TABLET ORAL
Qty: 35 TABLET | Refills: 5 | OUTPATIENT
Start: 2023-05-26

## 2023-05-30 ENCOUNTER — ANTI-COAG VISIT (OUTPATIENT)
Dept: CARDIOLOGY | Facility: CLINIC | Age: 68
End: 2023-05-30
Payer: MEDICARE

## 2023-05-30 ENCOUNTER — TELEPHONE (OUTPATIENT)
Dept: TRANSPLANT | Facility: CLINIC | Age: 68
End: 2023-05-30
Payer: MEDICARE

## 2023-05-30 DIAGNOSIS — Z95.811 LVAD (LEFT VENTRICULAR ASSIST DEVICE) PRESENT: Primary | ICD-10-CM

## 2023-05-30 LAB — INR PPP: 1.4

## 2023-05-30 PROCEDURE — 93793 ANTICOAG MGMT PT WARFARIN: CPT | Mod: S$GLB,,,

## 2023-05-30 PROCEDURE — 93793 PR ANTICOAGULANT MGMT FOR PT TAKING WARFARIN: ICD-10-PCS | Mod: S$GLB,,,

## 2023-05-30 NOTE — TELEPHONE ENCOUNTER
Faxed out patient lab orders for digoxin level, Mg and lipids to     [address] [phone]  // [facility] [phone] [phone]/ results Lab - [phone]

## 2023-05-30 NOTE — PROGRESS NOTES
Pt was questioned and confirmed taking all correct doses. Pt states hes been doing everything like he is supposed to be  and confirmed no other changes to be reported.

## 2023-06-01 ENCOUNTER — TELEPHONE (OUTPATIENT)
Dept: NEUROLOGY | Facility: CLINIC | Age: 68
End: 2023-06-01
Payer: MEDICARE

## 2023-06-01 ENCOUNTER — TELEPHONE (OUTPATIENT)
Dept: TRANSPLANT | Facility: CLINIC | Age: 68
End: 2023-06-01
Payer: MEDICARE

## 2023-06-01 DIAGNOSIS — M48.02 CERVICAL STENOSIS OF SPINAL CANAL: Primary | ICD-10-CM

## 2023-06-01 NOTE — TELEPHONE ENCOUNTER
Pt declined EMG appt 6/5 offered to him per MD preference for urgent appt. Pt lives in [facility] and wishes to come to [facility] [place] [place] July 6 when here for additional appts.

## 2023-06-01 NOTE — TELEPHONE ENCOUNTER
"Nurse Bateman is Mr. France's  nurse she is wonderful and very attentive to his care. On last week we went over all of his medications from discharge and confirmed with the pharmacy. The following medications were discontinued by Dr. Lake: Amlodipine 5 mg, Lisinopril 5 mg, and Aldactone 25 mg. He was instructed to report his bp readings over the weekend so his nurse called today with readings from Sunday.    Mr France is currently only taking Digoxin 125 mcg daily for his bp.    Sunday 5/28  Am - 107/75 (80) map  Pm - 116/72 (84) map    Monday 5/29  Am - 109/76 (81) map    Tuesday 5/30  Am - 102/77 (78) map  Pm - 108/68 (83) map    Wednesday 5/31  Am - 110/78 (80) map    Thursday 6/1  Am - 115/74 (83) cassi Bateman will call in on Wednesday or Thursday of every week as a "check in" until Mr. France's next VAD appt. Today he reports no falls and no dizziness    "

## 2023-06-02 ENCOUNTER — ANTI-COAG VISIT (OUTPATIENT)
Dept: CARDIOLOGY | Facility: CLINIC | Age: 68
End: 2023-06-02
Payer: MEDICARE

## 2023-06-02 ENCOUNTER — TELEPHONE (OUTPATIENT)
Dept: NEUROLOGY | Facility: CLINIC | Age: 68
End: 2023-06-02
Payer: MEDICARE

## 2023-06-02 DIAGNOSIS — Z95.811 LVAD (LEFT VENTRICULAR ASSIST DEVICE) PRESENT: Primary | ICD-10-CM

## 2023-06-02 LAB
EXT CHOLESTEROL: 121
EXT HDL: 45
EXT HEMATOCRIT: 28.2
EXT HEMOGLOBIN: 8.6
EXT LDL CHOLESTEROL: 64
EXT MAGNESIUM: 2.2
EXT PLATELETS: 243
EXT TRIGLYCERIDES: 90
EXT WBC: 8.9
INR PPP: 1.4
Lab: 0.68

## 2023-06-02 PROCEDURE — 93793 ANTICOAG MGMT PT WARFARIN: CPT | Mod: S$GLB,,,

## 2023-06-02 PROCEDURE — 93793 PR ANTICOAGULANT MGMT FOR PT TAKING WARFARIN: ICD-10-PCS | Mod: S$GLB,,,

## 2023-06-02 NOTE — TELEPHONE ENCOUNTER
----- Message from Cira Izquierdo LPN sent at 5/30/2023 11:50 AM CDT -----  Regarding: FW: digoxin level, mag, lipids to determine medication admin    ----- Message -----  From: Cira Izquierdo LPN  Sent: 5/30/2023   8:42 AM CDT  To: [facility] Lvad Clinical  Subject: digoxin level, mag, lipids to determine medi[address] [phone]  // [facility] [phone]/ results [facility] - [phone]

## 2023-06-02 NOTE — PROGRESS NOTES
Spoke with patient regarding recent INR results .  Patient questioned and verified correct dosage . Reported appetite increase .  Patient wife advised HH orders faxed [facility]  [phone].

## 2023-06-02 NOTE — TELEPHONE ENCOUNTER
Spoke with the patient's wife. Offered next available appointment for EMG 7/6. Pt accepted next available, verbalized understanding, and repeated back date/time/location of scheduled appointment.

## 2023-06-02 NOTE — TELEPHONE ENCOUNTER
Contacted [facility] in efforts to obtain lab results    Labs were not drawn     Fax number was confirmed and was correct    Was instructed to fax lab order and they will contact patient to have him visit lab to have blood work completed.     Lab order faxed .   Patient contacted spoke with patient spouse and informed they must return to lab today to get blood work completed.     Understanding verbalized , stated they will return to lab today and complete blood work.

## 2023-06-05 LAB — INR PPP: 1.4

## 2023-06-06 ENCOUNTER — ANTI-COAG VISIT (OUTPATIENT)
Dept: CARDIOLOGY | Facility: CLINIC | Age: 68
End: 2023-06-06
Payer: MEDICARE

## 2023-06-06 ENCOUNTER — TELEPHONE (OUTPATIENT)
Dept: TRANSPLANT | Facility: CLINIC | Age: 68
End: 2023-06-06
Payer: MEDICARE

## 2023-06-06 DIAGNOSIS — Z95.811 LVAD (LEFT VENTRICULAR ASSIST DEVICE) PRESENT: Primary | ICD-10-CM

## 2023-06-06 PROCEDURE — 93793 ANTICOAG MGMT PT WARFARIN: CPT | Mod: S$GLB,,,

## 2023-06-06 PROCEDURE — 93793 PR ANTICOAGULANT MGMT FOR PT TAKING WARFARIN: ICD-10-PCS | Mod: S$GLB,,,

## 2023-06-06 NOTE — TELEPHONE ENCOUNTER
----- Message from Cira Izquierdo LPN sent at 6/2/2023  9:11 AM CDT -----  Regarding: FW: digoxin level, mag, lipids to determine medication admin  Not drawn on 6/1  New orders faxed to be drawn today 6/2  Spoke with patient wife.   ----- Message -----  From: Cira Izquierdo LPN  Sent: 5/30/2023  11:51 AM CDT  To: [facility]  Subject: FW: digoxin level, mag, lipids to determine #      ----- Message -----  From: Cira Izquierdo LPN  Sent: 5/30/2023   8:42 AM CDT  To: [facility]  Subject: digoxin level, mag, lipids to determine medi#    Home [address] [phone]  // [facility] [phone]/ results [facility] [phone]

## 2023-06-06 NOTE — PROGRESS NOTES
We got MG, lipids, and dig levels after patient had med confusions, all look relatively fine, but will you take a look and review, we ordered to assess for any medication adjustment needs because the patient didn't know what he was or was not taking so we continued certain medications but wanted to make sure it was safe to continue. Let me know, thanks

## 2023-06-06 NOTE — PROGRESS NOTES
Pt confirmed he takes 2.5mg on Monday and 5mg all other days. He denies any changes to diet, health, or medication. He denies any other changes

## 2023-06-09 ENCOUNTER — ANTI-COAG VISIT (OUTPATIENT)
Dept: CARDIOLOGY | Facility: CLINIC | Age: 68
End: 2023-06-09
Payer: MEDICARE

## 2023-06-09 DIAGNOSIS — Z95.811 LVAD (LEFT VENTRICULAR ASSIST DEVICE) PRESENT: Primary | ICD-10-CM

## 2023-06-09 LAB — INR PPP: 1.5

## 2023-06-09 PROCEDURE — 93793 PR ANTICOAGULANT MGMT FOR PT TAKING WARFARIN: ICD-10-PCS | Mod: S$GLB,,,

## 2023-06-09 PROCEDURE — 93793 ANTICOAG MGMT PT WARFARIN: CPT | Mod: S$GLB,,,

## 2023-06-09 NOTE — PROGRESS NOTES
Pt reports no changes to be noted and confirmed taking all correct doses. Patient was given day by day dose of coumadin and redraw lab date, verbalized understanding.

## 2023-06-13 ENCOUNTER — ANTI-COAG VISIT (OUTPATIENT)
Dept: CARDIOLOGY | Facility: CLINIC | Age: 68
End: 2023-06-13
Payer: MEDICARE

## 2023-06-13 DIAGNOSIS — Z95.811 LVAD (LEFT VENTRICULAR ASSIST DEVICE) PRESENT: Primary | ICD-10-CM

## 2023-06-13 LAB — INR PPP: 1.7

## 2023-06-13 PROCEDURE — G0179 MD RECERTIFICATION HHA PT: HCPCS | Mod: ,,, | Performed by: INTERNAL MEDICINE

## 2023-06-13 PROCEDURE — G0179 PR HOME HEALTH MD RECERTIFICATION: ICD-10-PCS | Mod: ,,, | Performed by: INTERNAL MEDICINE

## 2023-06-13 PROCEDURE — 93793 ANTICOAG MGMT PT WARFARIN: CPT | Mod: S$GLB,,,

## 2023-06-13 PROCEDURE — 93793 PR ANTICOAGULANT MGMT FOR PT TAKING WARFARIN: ICD-10-PCS | Mod: S$GLB,,,

## 2023-06-15 ENCOUNTER — EXTERNAL HOME HEALTH (OUTPATIENT)
Dept: HOME HEALTH SERVICES | Facility: HOSPITAL | Age: 68
End: 2023-06-15
Payer: MEDICARE

## 2023-06-20 ENCOUNTER — ANTI-COAG VISIT (OUTPATIENT)
Dept: CARDIOLOGY | Facility: CLINIC | Age: 68
End: 2023-06-20
Payer: MEDICARE

## 2023-06-20 DIAGNOSIS — Z95.811 LVAD (LEFT VENTRICULAR ASSIST DEVICE) PRESENT: Primary | ICD-10-CM

## 2023-06-20 LAB — INR PPP: 1.6

## 2023-06-20 PROCEDURE — 93793 ANTICOAG MGMT PT WARFARIN: CPT | Mod: S$GLB,,,

## 2023-06-20 PROCEDURE — 93793 PR ANTICOAGULANT MGMT FOR PT TAKING WARFARIN: ICD-10-PCS | Mod: S$GLB,,,

## 2023-06-23 ENCOUNTER — TELEPHONE (OUTPATIENT)
Dept: TRANSPLANT | Facility: CLINIC | Age: 68
End: 2023-06-23
Payer: MEDICARE

## 2023-06-23 NOTE — TELEPHONE ENCOUNTER
Signed Home Health Orders has been faxed to [facility]  Date 06/23/23  Order Number(s): PT evaluation  Post hospital order #[id number]  Order #[id number]  [id number]

## 2023-06-27 ENCOUNTER — ANTI-COAG VISIT (OUTPATIENT)
Dept: CARDIOLOGY | Facility: CLINIC | Age: 68
End: 2023-06-27
Payer: MEDICARE

## 2023-06-27 ENCOUNTER — TELEPHONE (OUTPATIENT)
Dept: TRANSPLANT | Facility: CLINIC | Age: 68
End: 2023-06-27
Payer: MEDICARE

## 2023-06-27 DIAGNOSIS — Z95.811 LVAD (LEFT VENTRICULAR ASSIST DEVICE) PRESENT: Primary | ICD-10-CM

## 2023-06-27 LAB — INR PPP: 1.8

## 2023-06-27 PROCEDURE — 93793 PR ANTICOAGULANT MGMT FOR PT TAKING WARFARIN: ICD-10-PCS | Mod: S$GLB,,,

## 2023-06-27 PROCEDURE — 93793 ANTICOAG MGMT PT WARFARIN: CPT | Mod: S$GLB,,,

## 2023-06-27 NOTE — TELEPHONE ENCOUNTER
Attempted to speak with patient regarding equipment maintenance due at upcoming clinic appointment on 7/6/2023. Left voicemail asking patient to bring MPU, UBC, emergency bag, and ALL batteries with them to next appointment for maintenance.  It is medically necessary to ensure patient has properly functioning equipment and wearables to prevent infection, injury or death to patient.

## 2023-06-28 ENCOUNTER — APPOINTMENT (OUTPATIENT)
Dept: URBAN - METROPOLITAN AREA SURGERY 21 | Age: 68
Setting detail: DERMATOLOGY
End: 2023-06-29

## 2023-06-28 DIAGNOSIS — L82.1 OTHER SEBORRHEIC KERATOSIS: ICD-10-CM

## 2023-06-28 DIAGNOSIS — L73.8 OTHER SPECIFIED FOLLICULAR DISORDERS: ICD-10-CM

## 2023-06-28 DIAGNOSIS — L57.0 ACTINIC KERATOSIS: ICD-10-CM

## 2023-06-28 DIAGNOSIS — D18.0 HEMANGIOMA: ICD-10-CM

## 2023-06-28 DIAGNOSIS — L81.4 OTHER MELANIN HYPERPIGMENTATION: ICD-10-CM

## 2023-06-28 PROBLEM — D18.01 HEMANGIOMA OF SKIN AND SUBCUTANEOUS TISSUE: Status: ACTIVE | Noted: 2023-06-28

## 2023-06-28 PROCEDURE — 99202 OFFICE O/P NEW SF 15 MIN: CPT | Mod: 25

## 2023-06-28 PROCEDURE — OTHER COUNSELING: OTHER

## 2023-06-28 PROCEDURE — 17003 DESTRUCT PREMALG LES 2-14: CPT

## 2023-06-28 PROCEDURE — OTHER LIQUID NITROGEN: OTHER

## 2023-06-28 PROCEDURE — 17000 DESTRUCT PREMALG LESION: CPT

## 2023-06-28 ASSESSMENT — LOCATION DETAILED DESCRIPTION DERM
LOCATION DETAILED: RIGHT CLAVICULAR SKIN
LOCATION DETAILED: INFERIOR THORACIC SPINE
LOCATION DETAILED: RIGHT POSTERIOR SHOULDER
LOCATION DETAILED: EPIGASTRIC SKIN
LOCATION DETAILED: LEFT MID-UPPER BACK
LOCATION DETAILED: LEFT FOREHEAD
LOCATION DETAILED: LEFT CLAVICULAR SKIN
LOCATION DETAILED: RIGHT INFERIOR UPPER BACK
LOCATION DETAILED: LEFT CENTRAL MALAR CHEEK
LOCATION DETAILED: RIGHT ANTERIOR SHOULDER
LOCATION DETAILED: RIGHT INFERIOR MEDIAL FOREHEAD
LOCATION DETAILED: LEFT INFERIOR LATERAL UPPER BACK
LOCATION DETAILED: LEFT PROXIMAL LATERAL POSTERIOR UPPER ARM
LOCATION DETAILED: LEFT ANTERIOR SHOULDER
LOCATION DETAILED: RIGHT INFERIOR FOREHEAD
LOCATION DETAILED: RIGHT CENTRAL MALAR CHEEK
LOCATION DETAILED: RIGHT MEDIAL FOREHEAD
LOCATION DETAILED: LEFT SUPERIOR MEDIAL UPPER BACK
LOCATION DETAILED: LEFT SUPERIOR UPPER BACK
LOCATION DETAILED: RIGHT LATERAL INFERIOR CHEST
LOCATION DETAILED: RIGHT DISTAL DORSAL FOREARM
LOCATION DETAILED: LEFT POSTERIOR SHOULDER
LOCATION DETAILED: RIGHT NASAL SIDEWALL
LOCATION DETAILED: LEFT MEDIAL SUPERIOR CHEST
LOCATION DETAILED: INFERIOR MID FOREHEAD
LOCATION DETAILED: LEFT INFERIOR MEDIAL FOREHEAD

## 2023-06-28 ASSESSMENT — LOCATION SIMPLE DESCRIPTION DERM
LOCATION SIMPLE: ABDOMEN
LOCATION SIMPLE: LEFT UPPER BACK
LOCATION SIMPLE: RIGHT CHEEK
LOCATION SIMPLE: RIGHT FOREHEAD
LOCATION SIMPLE: RIGHT FOREARM
LOCATION SIMPLE: RIGHT NOSE
LOCATION SIMPLE: LEFT CHEEK
LOCATION SIMPLE: LEFT FOREHEAD
LOCATION SIMPLE: INFERIOR FOREHEAD
LOCATION SIMPLE: RIGHT UPPER BACK
LOCATION SIMPLE: LEFT CLAVICULAR SKIN
LOCATION SIMPLE: LEFT UPPER ARM
LOCATION SIMPLE: RIGHT SHOULDER
LOCATION SIMPLE: RIGHT CLAVICULAR SKIN
LOCATION SIMPLE: UPPER BACK
LOCATION SIMPLE: CHEST
LOCATION SIMPLE: LEFT SHOULDER

## 2023-06-28 ASSESSMENT — LOCATION ZONE DERM
LOCATION ZONE: TRUNK
LOCATION ZONE: FACE
LOCATION ZONE: ARM
LOCATION ZONE: NOSE

## 2023-07-06 ENCOUNTER — CLINICAL SUPPORT (OUTPATIENT)
Dept: TRANSPLANT | Facility: CLINIC | Age: 68
End: 2023-07-06
Payer: MEDICARE

## 2023-07-06 ENCOUNTER — LAB VISIT (OUTPATIENT)
Dept: LAB | Facility: HOSPITAL | Age: 68
End: 2023-07-06
Attending: INTERNAL MEDICINE
Payer: MEDICARE

## 2023-07-06 ENCOUNTER — HOSPITAL ENCOUNTER (OUTPATIENT)
Dept: PULMONOLOGY | Facility: CLINIC | Age: 68
Discharge: HOME | End: 2023-07-06
Payer: MEDICARE

## 2023-07-06 ENCOUNTER — PROCEDURE VISIT (OUTPATIENT)
Dept: NEUROLOGY | Facility: CLINIC | Age: 68
End: 2023-07-06
Payer: MEDICARE

## 2023-07-06 ENCOUNTER — ANTI-COAG VISIT (OUTPATIENT)
Dept: CARDIOLOGY | Facility: CLINIC | Age: 68
End: 2023-07-06
Payer: MEDICARE

## 2023-07-06 ENCOUNTER — OFFICE VISIT (OUTPATIENT)
Dept: TRANSPLANT | Facility: CLINIC | Age: 68
End: 2023-07-06
Attending: INTERNAL MEDICINE
Payer: MEDICARE

## 2023-07-06 VITALS
HEIGHT: 74 IN | HEIGHT: 74 IN | BODY MASS INDEX: 20.53 KG/M2 | WEIGHT: 163 LBS | WEIGHT: 160 LBS | TEMPERATURE: 98 F | SYSTOLIC BLOOD PRESSURE: 92 MMHG | BODY MASS INDEX: 20.92 KG/M2

## 2023-07-06 DIAGNOSIS — Z95.810 ICD (IMPLANTABLE CARDIOVERTER-DEFIBRILLATOR) IN PLACE: ICD-10-CM

## 2023-07-06 DIAGNOSIS — Z95.811 LVAD (LEFT VENTRICULAR ASSIST DEVICE) PRESENT: ICD-10-CM

## 2023-07-06 DIAGNOSIS — I42.8 NICM (NONISCHEMIC CARDIOMYOPATHY): ICD-10-CM

## 2023-07-06 DIAGNOSIS — Z95.811 HEART REPLACED BY HEART ASSIST DEVICE: Primary | ICD-10-CM

## 2023-07-06 DIAGNOSIS — M48.02 CERVICAL STENOSIS OF SPINAL CANAL: ICD-10-CM

## 2023-07-06 DIAGNOSIS — Z95.811 HEART REPLACED BY HEART ASSIST DEVICE: ICD-10-CM

## 2023-07-06 DIAGNOSIS — I10 ESSENTIAL HYPERTENSION: ICD-10-CM

## 2023-07-06 DIAGNOSIS — I50.42 CHRONIC COMBINED SYSTOLIC AND DIASTOLIC HEART FAILURE: ICD-10-CM

## 2023-07-06 DIAGNOSIS — Z95.811 LVAD (LEFT VENTRICULAR ASSIST DEVICE) PRESENT: Primary | ICD-10-CM

## 2023-07-06 LAB
ALBUMIN SERPL BCP-MCNC: 2.6 G/DL (ref 3.5–5.2)
ALP SERPL-CCNC: 115 U/L (ref 55–135)
ALT SERPL W/O P-5'-P-CCNC: 7 U/L (ref 10–44)
ANION GAP SERPL CALC-SCNC: 6 MMOL/L (ref 8–16)
AST SERPL-CCNC: 11 U/L (ref 10–40)
BASOPHILS # BLD AUTO: 0.02 K/UL (ref 0–0.2)
BASOPHILS NFR BLD: 0.2 % (ref 0–1.9)
BILIRUB DIRECT SERPL-MCNC: 0.2 MG/DL (ref 0.1–0.3)
BILIRUB SERPL-MCNC: 0.5 MG/DL (ref 0.1–1)
BNP SERPL-MCNC: 150 PG/ML (ref 0–99)
BUN SERPL-MCNC: 14 MG/DL (ref 8–23)
CALCIUM SERPL-MCNC: 9.6 MG/DL (ref 8.7–10.5)
CHLORIDE SERPL-SCNC: 105 MMOL/L (ref 95–110)
CO2 SERPL-SCNC: 28 MMOL/L (ref 23–29)
CREAT SERPL-MCNC: 0.8 MG/DL (ref 0.5–1.4)
CRP SERPL-MCNC: 89 MG/L (ref 0–8.2)
DIFFERENTIAL METHOD: ABNORMAL
EOSINOPHIL # BLD AUTO: 0.3 K/UL (ref 0–0.5)
EOSINOPHIL NFR BLD: 2.8 % (ref 0–8)
ERYTHROCYTE [DISTWIDTH] IN BLOOD BY AUTOMATED COUNT: 15.3 % (ref 11.5–14.5)
EST. GFR  (NO RACE VARIABLE): >60 ML/MIN/1.73 M^2
GLUCOSE SERPL-MCNC: 107 MG/DL (ref 70–110)
HCT VFR BLD AUTO: 30.6 % (ref 40–54)
HGB BLD-MCNC: 9.3 G/DL (ref 14–18)
IMM GRANULOCYTES # BLD AUTO: 0.04 K/UL (ref 0–0.04)
IMM GRANULOCYTES NFR BLD AUTO: 0.4 % (ref 0–0.5)
INR PPP: 1.6 (ref 0.8–1.2)
LDH SERPL L TO P-CCNC: 138 U/L (ref 110–260)
LYMPHOCYTES # BLD AUTO: 1.8 K/UL (ref 1–4.8)
LYMPHOCYTES NFR BLD: 18.2 % (ref 18–48)
MAGNESIUM SERPL-MCNC: 1.9 MG/DL (ref 1.6–2.6)
MCH RBC QN AUTO: 27.9 PG (ref 27–31)
MCHC RBC AUTO-ENTMCNC: 30.4 G/DL (ref 32–36)
MCV RBC AUTO: 92 FL (ref 82–98)
MONOCYTES # BLD AUTO: 0.8 K/UL (ref 0.3–1)
MONOCYTES NFR BLD: 8.7 % (ref 4–15)
NEUTROPHILS # BLD AUTO: 6.8 K/UL (ref 1.8–7.7)
NEUTROPHILS NFR BLD: 69.7 % (ref 38–73)
NRBC BLD-RTO: 0 /100 WBC
PHOSPHATE SERPL-MCNC: 2.6 MG/DL (ref 2.7–4.5)
PLATELET # BLD AUTO: 231 K/UL (ref 150–450)
PMV BLD AUTO: 9.5 FL (ref 9.2–12.9)
POTASSIUM SERPL-SCNC: 4.2 MMOL/L (ref 3.5–5.1)
PREALB SERPL-MCNC: 9 MG/DL (ref 20–43)
PROT SERPL-MCNC: 9.9 G/DL (ref 6–8.4)
PROTHROMBIN TIME: 16.4 SEC (ref 9–12.5)
RBC # BLD AUTO: 3.33 M/UL (ref 4.6–6.2)
SODIUM SERPL-SCNC: 139 MMOL/L (ref 136–145)
WBC # BLD AUTO: 9.69 K/UL (ref 3.9–12.7)

## 2023-07-06 PROCEDURE — 99999 PR PBB SHADOW E&M-EST. PATIENT-LVL III: ICD-10-PCS | Mod: PBBFAC,,, | Performed by: INTERNAL MEDICINE

## 2023-07-06 PROCEDURE — 1159F MED LIST DOCD IN RCRD: CPT | Mod: CPTII,S$GLB,, | Performed by: INTERNAL MEDICINE

## 2023-07-06 PROCEDURE — 83615 LACTATE (LD) (LDH) ENZYME: CPT | Performed by: INTERNAL MEDICINE

## 2023-07-06 PROCEDURE — 1157F ADVNC CARE PLAN IN RCRD: CPT | Mod: CPTII,S$GLB,, | Performed by: INTERNAL MEDICINE

## 2023-07-06 PROCEDURE — 3288F FALL RISK ASSESSMENT DOCD: CPT | Mod: CPTII,S$GLB,, | Performed by: INTERNAL MEDICINE

## 2023-07-06 PROCEDURE — 93750 OP LVAD INTERROGATION: ICD-10-PCS | Mod: S$GLB,,, | Performed by: INTERNAL MEDICINE

## 2023-07-06 PROCEDURE — 36415 COLL VENOUS BLD VENIPUNCTURE: CPT | Performed by: INTERNAL MEDICINE

## 2023-07-06 PROCEDURE — 3044F HG A1C LEVEL LT 7.0%: CPT | Mod: CPTII,S$GLB,, | Performed by: INTERNAL MEDICINE

## 2023-07-06 PROCEDURE — 93750 INTERROGATION VAD IN PERSON: CPT | Mod: S$GLB,,, | Performed by: INTERNAL MEDICINE

## 2023-07-06 PROCEDURE — 1100F PR PT FALLS ASSESS DOC 2+ FALLS/FALL W/INJURY/YR: ICD-10-PCS | Mod: CPTII,S$GLB,, | Performed by: INTERNAL MEDICINE

## 2023-07-06 PROCEDURE — 80053 COMPREHEN METABOLIC PANEL: CPT | Performed by: INTERNAL MEDICINE

## 2023-07-06 PROCEDURE — 93793 ANTICOAG MGMT PT WARFARIN: CPT | Mod: S$GLB,,,

## 2023-07-06 PROCEDURE — 83735 ASSAY OF MAGNESIUM: CPT | Performed by: INTERNAL MEDICINE

## 2023-07-06 PROCEDURE — 1157F PR ADVANCE CARE PLAN OR EQUIV PRESENT IN MEDICAL RECORD: ICD-10-PCS | Mod: CPTII,S$GLB,, | Performed by: INTERNAL MEDICINE

## 2023-07-06 PROCEDURE — 3008F BODY MASS INDEX DOCD: CPT | Mod: CPTII,S$GLB,, | Performed by: INTERNAL MEDICINE

## 2023-07-06 PROCEDURE — 84100 ASSAY OF PHOSPHORUS: CPT | Performed by: INTERNAL MEDICINE

## 2023-07-06 PROCEDURE — 94618 PULMONARY STRESS TESTING: CPT | Mod: S$GLB,,, | Performed by: INTERNAL MEDICINE

## 2023-07-06 PROCEDURE — 86140 C-REACTIVE PROTEIN: CPT | Performed by: INTERNAL MEDICINE

## 2023-07-06 PROCEDURE — 93750 PR INTERROGATE VENT ASSIST DEV, IN PERSON, W PHYSICIAN ANALYSIS: ICD-10-PCS | Mod: S$GLB,,, | Performed by: INTERNAL MEDICINE

## 2023-07-06 PROCEDURE — 99214 OFFICE O/P EST MOD 30 MIN: CPT | Mod: S$GLB,,, | Performed by: INTERNAL MEDICINE

## 2023-07-06 PROCEDURE — 99499 UNLISTED E&M SERVICE: CPT | Mod: S$GLB,,, | Performed by: PSYCHIATRY & NEUROLOGY

## 2023-07-06 PROCEDURE — 1160F PR REVIEW ALL MEDS BY PRESCRIBER/CLIN PHARMACIST DOCUMENTED: ICD-10-PCS | Mod: CPTII,S$GLB,, | Performed by: INTERNAL MEDICINE

## 2023-07-06 PROCEDURE — 99999 PR PBB SHADOW E&M-EST. PATIENT-LVL I: ICD-10-PCS | Mod: PBBFAC,,,

## 2023-07-06 PROCEDURE — 85025 COMPLETE CBC W/AUTO DIFF WBC: CPT | Performed by: INTERNAL MEDICINE

## 2023-07-06 PROCEDURE — 82248 BILIRUBIN DIRECT: CPT | Performed by: INTERNAL MEDICINE

## 2023-07-06 PROCEDURE — 3288F PR FALLS RISK ASSESSMENT DOCUMENTED: ICD-10-PCS | Mod: CPTII,S$GLB,, | Performed by: INTERNAL MEDICINE

## 2023-07-06 PROCEDURE — 99999 PR PBB SHADOW E&M-EST. PATIENT-LVL III: CPT | Mod: PBBFAC,,, | Performed by: INTERNAL MEDICINE

## 2023-07-06 PROCEDURE — 1100F PTFALLS ASSESS-DOCD GE2>/YR: CPT | Mod: CPTII,S$GLB,, | Performed by: INTERNAL MEDICINE

## 2023-07-06 PROCEDURE — 99214 PR OFFICE/OUTPT VISIT, EST, LEVL IV, 30-39 MIN: ICD-10-PCS | Mod: S$GLB,,, | Performed by: INTERNAL MEDICINE

## 2023-07-06 PROCEDURE — 85610 PROTHROMBIN TIME: CPT | Performed by: INTERNAL MEDICINE

## 2023-07-06 PROCEDURE — 84134 ASSAY OF PREALBUMIN: CPT | Performed by: INTERNAL MEDICINE

## 2023-07-06 PROCEDURE — 1160F RVW MEDS BY RX/DR IN RCRD: CPT | Mod: CPTII,S$GLB,, | Performed by: INTERNAL MEDICINE

## 2023-07-06 PROCEDURE — 3008F PR BODY MASS INDEX (BMI) DOCUMENTED: ICD-10-PCS | Mod: CPTII,S$GLB,, | Performed by: INTERNAL MEDICINE

## 2023-07-06 PROCEDURE — 1159F PR MEDICATION LIST DOCUMENTED IN MEDICAL RECORD: ICD-10-PCS | Mod: CPTII,S$GLB,, | Performed by: INTERNAL MEDICINE

## 2023-07-06 PROCEDURE — 3044F PR MOST RECENT HEMOGLOBIN A1C LEVEL <7.0%: ICD-10-PCS | Mod: CPTII,S$GLB,, | Performed by: INTERNAL MEDICINE

## 2023-07-06 PROCEDURE — 83880 ASSAY OF NATRIURETIC PEPTIDE: CPT | Performed by: INTERNAL MEDICINE

## 2023-07-06 PROCEDURE — 99999 PR PBB SHADOW E&M-EST. PATIENT-LVL I: CPT | Mod: PBBFAC,,,

## 2023-07-06 PROCEDURE — 94618 PULMONARY STRESS TESTING: ICD-10-PCS | Mod: S$GLB,,, | Performed by: INTERNAL MEDICINE

## 2023-07-06 PROCEDURE — 93793 PR ANTICOAGULANT MGMT FOR PT TAKING WARFARIN: ICD-10-PCS | Mod: S$GLB,,,

## 2023-07-06 PROCEDURE — 99499 NO LOS: ICD-10-PCS | Mod: S$GLB,,, | Performed by: PSYCHIATRY & NEUROLOGY

## 2023-07-06 RX ORDER — AMLODIPINE BESYLATE 5 MG/1
5 TABLET ORAL DAILY
Qty: 30 TABLET | Refills: 11 | Status: SHIPPED | OUTPATIENT
Start: 2023-07-06 | End: 2023-10-04 | Stop reason: SDUPTHER

## 2023-07-06 NOTE — LETTER
July 6, 2023        Teresa Mendoza  [address]  [address]  [place] [place] [facility] [address]  Phone: [phone]  Fax: [phone]             [id number]  [address]  [place] [address]  Phone: [phone]   Patient: Rocco France   MR Number: [id number]   YOB: 1955   Date of Visit: 7/6/2023       Dear Dr. Teresa Mendoza    Thank you for referring Rocco France to me for evaluation. Attached you will find relevant portions of my assessment and plan of care.    If you have questions, please do not hesitate to call me. I look forward to following Rocco France along with you.    Sincerely,    Manuel Ramos Jr, MD    Enclosure    If you would like to receive this communication electronically, please contact [email or web address] or [phone] to request [employer] access.    [employer] is a tool which provides read-only access to select patient information with whom you have a relationship. Its easy to use and provides real time access to review your patients record including encounter summaries, notes, results, and demographic information.    If you feel you have received this communication in error or would no longer like to receive these types of communications, please e-mail [email or web address]

## 2023-07-06 NOTE — PROCEDURES
Procedures      Patient did not tolerate the procedure.    Song Barrow MD  [facility]

## 2023-07-06 NOTE — PROCEDURES
Rocco France is a 67 y.o.  male patient, who presents for a 6 minute walk test ordered by MD Chloe.  The diagnosis is Left Ventricular Assist Device; Cardiomyopathy.  The patient's BMI is 20.5 kg/m2.  Predicted distance (lower limit of normal) is 407.02 meters.      Test Results:    The test was completed without stopping.  The total time walked was 360 seconds.  During walking, the patient reported:  No complaints.  The patient used no assistive devices during testing.     07/06/2023---------Distance: 293.22 meters (962 feet)     O2 Sat % Supplemental Oxygen Heart Rate Blood Pressure Heath Scale   Pre-exercise  (Resting) 98 % Room Air 85 bpm 112/81 mmHg 0   During Exercise 95 % Room Air 98 bpm 120/86 mmHg 0   Post-exercise  (Recovery) 99 % Room Air  90 bpm       Recovery Time:  65 seconds               The patient walked the first 15 seconds in 4.59 seconds.    Performing nurse/tech: Tia CALABRESE      PREVIOUS STUDY:   07/13/2022---------Distance: 259.08 meters (850 feet)       O2 Sat % Supplemental Oxygen Heart Rate Blood Pressure Heath Scale   Pre-exercise  (Resting) 99 % Room Air 35 bpm Unable to obtain 0   During Exercise 96 % Room Air 67 bpm Unable to obtain 2   Post-exercise  (Recovery) 98 % Room Air  56 bpm          Recovery Time:  199 seconds               The patient walked the first 15 seconds in 6.01 seconds.      CLINICAL INTERPRETATION:  Six minute walk distance is 293.22 meters (962 feet) with no dyspnea.  During exercise, there was no significant desaturation while breathing room air.  Both blood pressure and heart rate remained stable with walking.  The patient did not report non-pulmonary symptoms during exercise.  Significant exercise impairment is likely due to subjective symptoms.  The patient did complete the study, walking 360 seconds of the 360 second test.  Since the previous study in July 2022, exercise capacity may be somewhat improved.  Based upon age and body mass index,  exercise capacity is less than predicted.

## 2023-07-06 NOTE — PATIENT INSTRUCTIONS
To calculate MAP  First, subtract bottom number from top number  Second, divide answer by 3  Third, add that number to bottom number    Example,  /60  First, 120-60=60  Second, 60 divided by 3=20  Third 20 + 60 = 80  In this example MAP is 80    Target MAP 65 to 90 mm Hg    Resume amlodipine 5 mg daily or at night your choice

## 2023-07-06 NOTE — PROGRESS NOTES
Patient was seen today in clinic. Patient came into clinic with hospital owned equipment that was given to him by nursing staff when he was last admitted (PPM and UBC). This equipment was thoroughly cleaned and tested before it was returned to the hospital. Maintenance was completed on patient's own UBC. Patient did not bring in his MPU after being instructed to do so. He was issued a new primary and backup controller EBB. His backup controller was charged and self test passed with the new backup battery. Patient was also issued 8 new 14 V batteries and a new holster vest.    Equipment:  Any Equipment Issues: None noted     Emergency Bag  Emergency Equipment With Patient: Yes   Condition of emergency bag: Good  Contents of emergency bag: Backup controller, 2 batteries, 2 clips, reference cards    Maintenance Tracking  Patient has monthly checklist today: No}  Patient correctly utilizing monthly checklist: No  Educated patient on utilizing monthly checklist correctly and importance of this: Yes    Equipment Inspection  Inspected patient's equipment today: Yes  Equipment clean and free of debris, including locking mechanism: Yes  Cleaned equipment today and educated patient on how to do this: Yes    Universal Battery Charger  [facility] serial number: [id number]  [facility] maintenance due:7/2024  UBC maintenance completed today Yes    Primary Controller and Emergency Backup Battery  Primary controller serial number: [id number]  EBB S/N: [id number]  Manufacture date: 11/10/2022  EBB expires and due to be changed: 11/2025  EBB changed today: Yes    Backup Controller and Emergency Backup Battery  Backup controller serial number: [id number]  EBB S/N: [id number]  Manufacture date: 11/10/2022  EBB due to be charged: 1/2024  EBB charged today and self test completed: Yes  Self test passed:  Yes  EBB expires and due to be changed: 11/2025  EBB changed today: Yes    Equipment Taken  Primary EBB: [id number]  Backup EBB: [id number]  Batteries:      Serial Number                          Expiration Date  1.[id number] 9/2023   2.[id number] 9/2023   3.[id number] 9/2023   4.[id number] 9/2023   5.[id number] 9/2023   6.[id number] 9/2023   7.[id number] 9/2023   8.[id number] 9/2023       Equipment Issued  Holster vest: [id number]  Primary EBB: [id number]  Backup EBB: [id number]  Batteries:     Serial Number                          Expiration Date  1.[id number] 5/2026   2.[id number] 5/2026   3.[id number] 5/2026   4.[id number] 5/2026   5.[id number] 5/2026   6.[id number] 5/2026   7.[id number] 5/2026   8.[id number] 5/2026     Hospital Equipment Returned  Power module: [id number]  Battery charger[place] [id number]    Equipment Needs  Other equipment issues: None  Equipment given to patient today in clinic: 8 new 14 V batteries, 2 new 11 V batteries, holster vest  Discussed with MCS Coordinator and physician: Yes  Items Under Warranty Yes VAD Coordinator Notified Yes  Equipment loaned to patient today: No  It is medically necessary to ensure patient has properly functioning equipment and wearables to prevent infection, injury or death to patient.

## 2023-07-06 NOTE — PROGRESS NOTES
Subjective:   Patient ID:  Rocco France is a 67 y.o. male who presents for LVAD followup visit.    Implant Date: 1/13/2021  Initials: [facility]     Heartmate 3 RPM 5200     INR goal: 2-2.5  Bridge with Heparin   Antiplatelets:  stopped d/t SDH    TXP JACEY INTERROGATIONS 7/6/2023   Type HeartMate3   Flow 3.7   Speed 5200   PI 8.9   Power (Edwards) 3.6   LSL 4800   Pulsatility Intermittent pulse   Interrogation of Ventricular assist device was performed with physician analysis of device parameters and review of device function. I have personally reviewed the interrogation findings and agree with findings as stated.     HPI  66 yo BM with stage D CHF due to NICM underwent HM3 implantation 1/13/2021 with sternal closure 1/14/2021.  He was admitted for syncope 1/27/2022 at which time he was found to have a evolving right cerebral acute subdural hematoma and acute basal cistern subarachnoid hemorrhage. He requiring intubation with prolonged hospitalization and was incidentally found to be positive for COVID-19 and treated with remdesivir. He was taken off of aspirin and discharged home 2/17/2022. He was readmitted 3/2022 for 3 days due to dysequilibrium, diplopia and repeat CT head was stable. Neurology recommended myasthenia gravis workup which was negative.  He was last seen in clinic April 2023 at which time the body of the note reflects a different dose of amlodipine and lisinopril then the patient list included in the note.  Patient does not recall what his medications were at that time but when he was readmitted to the hospital May 2023 following a fall the available information indicates that he was on lisinopril 5 mg and amlodipine 5 mg both daily.  He and his wife report the home health nurse helps with his blood pressure.  The home health nurse called in blood pressure readings to [facility] and after consultation with Dr. Lake the lisinopril and amlodipine were discontinued.  The patient states he was asymptomatic at that  "time.  His Doppler blood pressure is 92 mm Hg today.  I have reviewed his home blood pressure readings by the home health nurse.  The calculated MAP's are incorrect and her actually in the upper 80s to 90s off of amlodipine and lisinopril.  He is not had any dizzy or lightheaded spells.  No more falls.  He reports his weight stable, appetite good and chronic dyspnea on exertion stable.  He does still have some generalized weakness a little worse on the right side since his stroke.    Review of Systems   Constitutional: Positive for malaise/fatigue. Negative for chills, fever, weight gain and weight loss.   Cardiovascular:  Positive for dyspnea on exertion. Negative for chest pain, claudication, irregular heartbeat, leg swelling, near-syncope, orthopnea, palpitations, paroxysmal nocturnal dyspnea and syncope.   Respiratory:  Negative for cough, shortness of breath, sputum production and wheezing.    Hematologic/Lymphatic: Negative for bleeding problem. Does not bruise/bleed easily.   Musculoskeletal:  Negative for falls (not since May 2023).   Gastrointestinal:  Negative for change in bowel habit, hematemesis, hematochezia and melena.   Genitourinary:  Negative for hematuria.   Neurological:  Positive for weakness. Negative for brief paralysis, dizziness (not lately), focal weakness, headaches and light-headedness (not lately).     Objective:   Blood pressure (!) 92/0, temperature 97.8 °F (36.6 °C), temperature source Oral, height 6' 2" (1.88 m), weight 73.9 kg (163 lb).body mass index is 20.93 kg/m².  Doppler: 92  Physical Exam  Constitutional:       General: He is not in acute distress.     Appearance: He is well-developed. He is not ill-appearing, toxic-appearing or diaphoretic.   HENT:      Head: Normocephalic and atraumatic.   Eyes:      General: No scleral icterus.        Right eye: No discharge.         Left eye: No discharge.      Conjunctiva/sclera: Conjunctivae normal.   Neck:      Thyroid: No thyromegaly. "      Vascular: No JVD.      Trachea: No tracheal deviation.   Cardiovascular:      Comments: Normal LVAD sounds; DL 1  Pulmonary:      Effort: Pulmonary effort is normal. No respiratory distress.      Breath sounds: Normal breath sounds. No wheezing or rales.   Abdominal:      General: Bowel sounds are normal. There is no distension.      Palpations: Abdomen is soft. There is no mass.      Tenderness: There is no abdominal tenderness. There is no guarding or rebound.   Musculoskeletal:         General: No swelling or tenderness.      Right lower leg: No edema.      Left lower leg: No edema.   Skin:     General: Skin is warm and dry.   Neurological:      General: No focal deficit present.      Mental Status: He is alert. Mental status is at baseline.   Psychiatric:         Mood and Affect: Mood normal.         Behavior: Behavior normal.         Thought Content: Thought content normal.         Judgment: Judgment normal.       Lab Results   Component Value Date     (H) 07/06/2023     07/06/2023    K 4.2 07/06/2023    MG 1.9 07/06/2023     07/06/2023    CO2 28 07/06/2023    PHOS 2.6 (L) 07/06/2023    BUN 14 07/06/2023    CREATININE 0.8 07/06/2023     07/06/2023    HGBA1C 6.6 (H) 05/19/2023    AST 11 07/06/2023    ALT 7 (L) 07/06/2023    ALBUMIN 2.6 (L) 07/06/2023    PROT 9.9 (H) 07/06/2023    BILITOT 0.5 07/06/2023    WBC 9.69 07/06/2023    HGB 9.3 (L) 07/06/2023    HCT 30.6 (L) 07/06/2023    HCT 30 (L) 03/22/2022     07/06/2023    INR 1.6 (H) 07/06/2023    INR 1.8 06/27/2023     07/06/2023    TSH 0.906 05/18/2023    CHOL 127 04/05/2023    HDL 36 (L) 04/05/2023    LDLCALC 76.6 04/05/2023    TRIG 72 04/05/2023     Assessment:      1. Heart replaced by heart assist device    2. Essential hypertension    3. Chronic combined systolic and diastolic heart failure    4. NICM (nonischemic cardiomyopathy)    5. ICD (implantable cardioverter-defibrillator) in place        Plan:   For his  mildly elevated blood pressure both here and on his home readings will resume amlodipine 5 mg once daily.  I am not resuming the lisinopril at this time.  I warned the patient and his wife that edema might occur as a side effect of amlodipine.  This should not be treated with Lasix.  He has Lasix available but very rarely takes a dose.    Re-educated patient (see patient instruction sheet) regarding calculation of MAP to share with home health nurse.  Target MAP 65-90 mm Hg    Continue to work on activity and strength    Patient is now NYHA II    Recommend 2 gram sodium restriction and 1500cc fluid restriction.    Encourage physical activity with graded exercise program.  Requested patient to weigh themselves daily, and to notify us if their weight increases by more than 3 lbs in 1 day or 5 lbs in 1 week.     Listed for transplant: No    UNOS Patient Status  Functional Status: 80% - Normal activity with effort: some symptoms of disease  Physical Capacity: Limited Mobility but does not use cane or wheelchair just takes his time walking and moving around  Working for Income: No  If no, reason not working: Patient Choice - Retired

## 2023-07-06 NOTE — PROGRESS NOTES
"Date of Implant with Heartmate 3 LVAD: 1/13/21    PATIENT ARRIVED IN CLINIC:  Ambulatory   Accompanied by: wife  Complaints/reason for visit today: routine    Vitals  Temperature, oral:   Temp Readings from Last 1 Encounters:   07/06/23 97.8 °F (36.6 °C) (Oral)     Blood Pressure:   BP Readings from Last 3 Encounters:   07/06/23 (!) 92/0   05/23/23 (!) 78/0   05/18/23 (!) 124/93        VAD Interrogation:  TXP JACEY INTERROGATIONS 7/6/2023 5/23/2023 5/22/2023   Type HeartMate3 - -   Flow 3.7 - -   Speed 5200 - -   PI 8.9 - -   Power (Edwards) 3.6 - -   LSL 4800 - -   Pulsatility Intermittent pulse Pulse Pulse     HCT:   Lab Results   Component Value Date    HCT 30.6 (L) 07/06/2023    HCT 30 (L) 03/22/2022       History Log: [id number]  Problems / Issues / Alarms with VAD if any: None noted  VAD Sounds: HM3     VAD Binder With Patient: no  Reviewed VAD Numbers In Binder: no            Equipment:  Emergency Equipment With Patient: Yes  Any Equipment Issues:  Refer to Ollie Vance detailed note    It is medically necessary to ensure patient has properly functioning equipment and wearables to prevent infection, injury or death to patient.     DLES Assessment:  Appearance Of Driveline: "1"  Antibiotics: NO  Velour: No  Manual & Visual Inspection Of Driveline: No kinks or tears noted  Stabilization Device In Use: yes, [place] securement device    Heartmate 3 Module Cable:  No yellow exposed and Attempted to unscrew modular cable to ensure it will be able to come lose in the event we ever need to change the modular cable while patient held the driveline in place so it would not move. Modular cable connection able to be unscrewed and re-tightened. Instructed pt to perform this weekly.    Patient MyChart Questionnaire: No flowsheet data found.     Assessment/ Quality of Life Survery:   Complaints Of Nausea / Vomiting: None noted    Appearance and Frequency Of Stools: normal and formed without blood & daily  Color Of Urine: " clear/yellow  Pain: NO  Coping/Depression/Anxiety: coping okay  Sleep Habits: 6-7 hrs /night  Sleep Aids:  remeron and it works  Showering: Yes, reminded to change dressing immediately after drying off  Self- Care I have no problems with self- care  Mobility I have no problems walking about  Usual Activities I have no problems with performing my usual activities  Activity/Exercise: not much right now in the heat   Driving: Yes. Reminded to pull over should there be an alarm before looking down at controller.  Additional Comments: N/a    DLES Dressing Care:   Frequency of Dressing Changes: daily & daily kit  Pt In Need Of Management Kits?:yes -   3 Box of daily kit  It is medically necessary to have VAD management kits in order to prevent infection or to assist in the healing of an infected DLES.    Labs:    Chemistry        Component Value Date/Time     07/06/2023 0813    K 4.2 07/06/2023 0813     07/06/2023 0813    CO2 28 07/06/2023 0813    BUN 14 07/06/2023 0813    CREATININE 0.8 07/06/2023 0813     07/06/2023 0813        Component Value Date/Time    CALCIUM 9.6 07/06/2023 0813    ALKPHOS 115 07/06/2023 0813    AST 11 07/06/2023 0813    ALT 7 (L) 07/06/2023 0813    BILITOT 0.5 07/06/2023 0813    ESTGFRAFRICA 55.3 (A) 07/13/2022 0834    EGFRNONAA 47.8 (A) 07/13/2022 0834            Magnesium   Date Value Ref Range Status   07/06/2023 1.9 1.6 - 2.6 mg/dL Final       Lab Results   Component Value Date    WBC 9.69 07/06/2023    HGB 9.3 (L) 07/06/2023    HCT 30.6 (L) 07/06/2023    MCV 92 07/06/2023     07/06/2023       Lab Results   Component Value Date    INR 1.6 (H) 07/06/2023    INR 1.8 06/27/2023    INR 1.6 06/20/2023       BNP   Date Value Ref Range Status   07/06/2023 150 (H) 0 - 99 pg/mL Final     Comment:     Values of less than 100 pg/ml are consistent with non-CHF populations.   04/05/2023 108 (H) 0 - 99 pg/mL Final     Comment:     Values of less than 100 pg/ml are consistent with  non-CHF populations.   01/26/2023 122 (H) 0 - 99 pg/mL Final     Comment:     Values of less than 100 pg/ml are consistent with non-CHF populations.       LD   Date Value Ref Range Status   07/06/2023 138 110 - 260 U/L Final     Comment:     Results are increased in hemolyzed samples.   05/23/2023 177 110 - 260 U/L Final     Comment:     Results are increased in hemolyzed samples.   05/18/2023 148 110 - 260 U/L Final     Comment:     Results are increased in hemolyzed samples.       Labs reviewed with patient: YES     Medication Reconciliation: per MA.  New Medication Detail Provided: yes  Coumadin Managed by: [facility]    Education: Reviewed driveline care, emergency procedures, how to change the controller, alarms with patient, as well as discussed how to page the VAD coordinator in case of an emergency.   Educated patient/family that chest compressions are allowed in the event they are needed.    Reminded patient/caregiver not to touch their face and to cover their mouth when they cough or sneeze.   Vaccines: Pt informed that we are encouraging all VAD patients to receive the Flu and COVID vaccines and boosters. Informed pt that they can take tylenol but should avoid other NSAIDs.       Plans/Needs: Doppler today 91, intermittently pulsatile.   No antihypertensives noted, D/C June 1 by Dr. Lake.  Pt not sure if he is on atorvastatin and reports he hasn't taken lasix in a while.  Discussed with Dr. Ramos. Amlodipine restarted today.   AVS printed and reviewed with pt.  Asked him to confirm all medication with AVS when he gets home. Pt going to neurology for EMG today.  RTC in 3 months    Hurricane Season: Yes, discussed with patient: With hurricane season approaching, we want to make sure you are fully prepared for any emergency.  Should the National Weather Service or your local authorities recommend a voluntary or mandatory evacuation of your area, The VAD team requires you to evacuate to a  safe place.  Remember, when it is a mandatory evacuation, traffic will become an issue for your limited battery power.  Therefore, we strongly urge you to evacuate early.      The VAD team advises you to have the following in place before hurricane season:  Have an evacuation plan in place including places to evacuate, names and phone numbers.  This information is required to be given to the VAD coordinator.  Have your VAD emergency contact numbers with you.  Make sure your prescriptions will not run out by the end of September.  Make sure you have enough medications, including pills, inhalers, patches,     etc. to take, should you be gone for more than 2 weeks.  Make sure ALL of your batteries are fully charged.  Bring enough dressing change supplies to last for at least 2 weeks.  Bring your VAD binder with you.  Make sure your binder is updated and complete with alarms reference card, patient hand book, emergency contact numbers, daily log sheets, etc.    If you do not have family or friends as an evacuation destination, we recommend evacuating to a safe area.   Do NOT evacuate to [facility].  The VAD team wants to stress the importance of planning for your evacuation in the event of a hurricane.  If you have any LVAD questions or issues, please contact the LVAD coordinator.

## 2023-07-06 NOTE — PROCEDURES
[facility] INTERROGATIONS 7/6/2023 5/23/2023 5/22/2023 5/22/2023 5/22/2023 5/22/2023 5/22/2023   Type HeartMate3 - - - - - -   Flow 3.7 - - - - - -   Speed 5200 - - - - - -   PI 8.9 - - - - - -   Power (Edwards) 3.6 - - - - - -   LSL 4800 - - - - - -   Pulsatility Intermittent pulse Pulse Pulse Pulse Pulse Pulse Pulse   }Interrogation of Ventricular assist device was performed with physician analysis of device parameters and review of device function. I have personally reviewed the interrogation findings and agree with findings as stated.

## 2023-07-13 ENCOUNTER — TELEPHONE (OUTPATIENT)
Dept: TRANSPLANT | Facility: CLINIC | Age: 68
End: 2023-07-13
Payer: MEDICARE

## 2023-07-13 LAB — INR PPP: 1.7

## 2023-07-13 NOTE — TELEPHONE ENCOUNTER
HH nurse called to report a map of 91 for Mr. France prior to taking his amlodipine 5mg. The medication was given and 1 hour late the nurse got a map of 79. She just wanted to make sure we were all on the same page. Mr. France was seen in VAD clinic on 7/6 and Dr. Ramos resumed the amlodipine 5mg. He gave Mr. France a target map of 65-90. Nurse has been notified and will call us as she see's fit.

## 2023-07-14 ENCOUNTER — EXTERNAL HOME HEALTH (OUTPATIENT)
Dept: HOME HEALTH SERVICES | Facility: HOSPITAL | Age: 68
End: 2023-07-14
Payer: MEDICARE

## 2023-07-14 ENCOUNTER — ANTI-COAG VISIT (OUTPATIENT)
Dept: CARDIOLOGY | Facility: CLINIC | Age: 68
End: 2023-07-14
Payer: MEDICARE

## 2023-07-14 DIAGNOSIS — Z95.811 LVAD (LEFT VENTRICULAR ASSIST DEVICE) PRESENT: Primary | ICD-10-CM

## 2023-07-14 LAB — INR PPP: 1.7

## 2023-07-14 PROCEDURE — 93793 ANTICOAG MGMT PT WARFARIN: CPT | Mod: S$GLB,,,

## 2023-07-14 PROCEDURE — 93793 PR ANTICOAGULANT MGMT FOR PT TAKING WARFARIN: ICD-10-PCS | Mod: S$GLB,,,

## 2023-07-14 NOTE — PROGRESS NOTES
[facility]    2023 9:29 AM    Assessment/Plan:    Patient presents today with therapeutic INR.    Assessment of patient findings and chart review: no significant findings     Recommendation for patient's warfarin regimen: Continue current maintenance dose    Recommend repeat INR in 1 week  _________________________________________________________________    Rocco NATALIE France (67 y.o.) is followed by the [employer] [facility].    Anticoagulation Summary  As of 2023      INR goal:  1.5-2.0   TTR:  49.6 % (2.3 y)   INR used for dosin.7 (2023)   Warfarin maintenance plan:  5 mg (5 mg x 1) every day   Weekly warfarin total:  35 mg   Plan last modified:  Cheryl Barahona, PharmD (2023)   Next INR check:  2023   Target end date:      Indications    LVAD (left ventricular assist device) present [Z95.811]                 Anticoagulation Episode Summary       INR check location:      Preferred lab:      Send INR reminders to:  [facility] COUMADIN LVAD    Comments:  LVAD // Home Health  [phone]  // [facility] ph [phone]/ results Lab - [phone]          Anticoagulation Care Providers       Provider Role Specialty Phone number    Pete Baker MD [facility] Cardiology [phone]

## 2023-07-21 ENCOUNTER — ANTI-COAG VISIT (OUTPATIENT)
Dept: CARDIOLOGY | Facility: CLINIC | Age: 68
End: 2023-07-21
Payer: MEDICARE

## 2023-07-21 DIAGNOSIS — Z95.811 LVAD (LEFT VENTRICULAR ASSIST DEVICE) PRESENT: Primary | ICD-10-CM

## 2023-07-21 LAB — INR PPP: 1.5

## 2023-07-21 PROCEDURE — 93793 ANTICOAG MGMT PT WARFARIN: CPT | Mod: S$GLB,,,

## 2023-07-21 PROCEDURE — 93793 PR ANTICOAGULANT MGMT FOR PT TAKING WARFARIN: ICD-10-PCS | Mod: S$GLB,,,

## 2023-07-21 NOTE — PROGRESS NOTES
[facility]    2023 10:39 AM    Assessment/Plan:    Patient presents today with therapeutic INR.    Assessment of patient findings and chart review: no significant findings     Recommendation for patient's warfarin regimen: Continue current maintenance dose    Recommend repeat INR in 1 week  _________________________________________________________________    Rocco NATALIE France (67 y.o.) is followed by the [employer] [facility].    Anticoagulation Summary  As of 2023      INR goal:  1.5-2.0   TTR:  50.0 % (2.3 y)   INR used for dosin.5 (2023)   Warfarin maintenance plan:  5 mg (5 mg x 1) every day   Weekly warfarin total:  35 mg   Plan last modified:  Cheryl Barahona, PharmD (2023)   Next INR check:  2023   Target end date:      Indications    LVAD (left ventricular assist device) present [Z95.811]                 Anticoagulation Episode Summary       INR check location:      Preferred lab:      Send INR reminders to:  [facility] COUMADIN LVAD    Comments:  LVAD // Home Health  [phone]  // [facility] ph [phone]/ results Lab - [phone]          Anticoagulation Care Providers       Provider Role Specialty Phone number    Pete Baker MD [facility] Cardiology [phone]

## 2023-07-27 ENCOUNTER — ANTI-COAG VISIT (OUTPATIENT)
Dept: CARDIOLOGY | Facility: CLINIC | Age: 68
End: 2023-07-27
Payer: MEDICARE

## 2023-07-27 ENCOUNTER — TELEPHONE (OUTPATIENT)
Dept: TRANSPLANT | Facility: CLINIC | Age: 68
End: 2023-07-27
Payer: MEDICARE

## 2023-07-27 DIAGNOSIS — Z95.811 LVAD (LEFT VENTRICULAR ASSIST DEVICE) PRESENT: Primary | ICD-10-CM

## 2023-07-27 LAB — INR PPP: 2.2

## 2023-07-27 PROCEDURE — 93793 ANTICOAG MGMT PT WARFARIN: CPT | Mod: S$GLB,,,

## 2023-07-27 PROCEDURE — 93793 PR ANTICOAGULANT MGMT FOR PT TAKING WARFARIN: ICD-10-PCS | Mod: S$GLB,,,

## 2023-07-27 NOTE — PROGRESS NOTES
Pt confirmed taking all correct doses and he states he has been having some headaches more then normal he feels. Pt reports no other changes to be noted.

## 2023-07-27 NOTE — PROGRESS NOTES
[facility]    2023 2:30 PM    Assessment/Plan:    Patient presents today with supratherapeutic INR.    Assessment of patient findings and chart review: reports more frequent headaches than normal. Given his history, I notified VAD team of this finding (attempted to call on call coordinator, KIMI Aaron. No answer. Sent epic message). Will advise patient that if he is having a severe, persistent headache that is not relieved by tylenol, he needs to go to ER immediately.     Recommendation for patient's warfarin regimen: Lower dose today to 2.5mg then resume current maintenance dose    Recommend repeat INR Monday  _________________________________________________________________    Rocco France (67 y.o.) is followed by the [employer] [facility].    Anticoagulation Summary  As of 2023      INR goal:  1.5-2.0   TTR:  50.2 % (2.3 y)   INR used for dosin.2 (2023)   Warfarin maintenance plan:  5 mg (5 mg x 1) every day   Weekly warfarin total:  35 mg   Plan last modified:  Cheryl Barahona, PharmD (2023)   Next INR check:  8/3/2023   Target end date:      Indications    LVAD (left ventricular assist device) present [Z95.811]                 Anticoagulation Episode Summary       INR check location:      Preferred lab:      Send INR reminders to:  [facility] COUMADIN LVAD    Comments:  LVAD // Home Health   [phone]  // [facility] ph [phone]/ results Lab - [phone]          Anticoagulation Care Providers       Provider Role Specialty Phone number    Pete Baker MD [facility] Cardiology [phone]

## 2023-07-27 NOTE — TELEPHONE ENCOUNTER
Received secure chat from Anurag Luna PharmD:  Mr. France told our MA he has been having more headaches than normal. I will advise he go to the ER for any persistent headache not relieved by tylenol. Just wanted to make yall aware given his history.    I called wife and patient, no answer on either number. I was able to leave a . I reported to other VCs and coumadin clinic to transfer call or assess headache.

## 2023-07-31 ENCOUNTER — ANTI-COAG VISIT (OUTPATIENT)
Dept: CARDIOLOGY | Facility: CLINIC | Age: 68
End: 2023-07-31
Payer: MEDICARE

## 2023-07-31 DIAGNOSIS — Z95.811 LVAD (LEFT VENTRICULAR ASSIST DEVICE) PRESENT: Primary | ICD-10-CM

## 2023-07-31 LAB — INR PPP: 2.7

## 2023-07-31 PROCEDURE — 93793 PR ANTICOAGULANT MGMT FOR PT TAKING WARFARIN: ICD-10-PCS | Mod: S$GLB,,,

## 2023-07-31 PROCEDURE — 93793 ANTICOAG MGMT PT WARFARIN: CPT | Mod: S$GLB,,,

## 2023-07-31 NOTE — PROGRESS NOTES
Patient reports correct Warfarin dose, bloody mucus when blowing nose 7/26/23, no other changes reported.

## 2023-07-31 NOTE — PROGRESS NOTES
Patient called and was advised of warfarin instructions and next re draw date, also advised Patient to contact LVAD regarding headaches and that if headaches are persistent and no relief with Tylenol to go to ER immediately, he verbalized understanding, order was faxed to [facility]  2000

## 2023-08-03 ENCOUNTER — TELEPHONE (OUTPATIENT)
Dept: TRANSPLANT | Facility: CLINIC | Age: 68
End: 2023-08-03
Payer: MEDICARE

## 2023-08-03 LAB — INR PPP: 1.5

## 2023-08-03 NOTE — TELEPHONE ENCOUNTER
HH nurse gave update on patient for past two weeks. States he has had occasional h/a 3/10. Noted that days he had h/a he also had elevated map 91-94. No h/a for past several days. Reports compliance with medications but some dietary indiscretions. Will continue to monitor.

## 2023-08-04 ENCOUNTER — ANTI-COAG VISIT (OUTPATIENT)
Dept: CARDIOLOGY | Facility: CLINIC | Age: 68
End: 2023-08-04
Payer: MEDICARE

## 2023-08-04 DIAGNOSIS — Z95.811 LVAD (LEFT VENTRICULAR ASSIST DEVICE) PRESENT: Primary | ICD-10-CM

## 2023-08-04 PROCEDURE — 93793 PR ANTICOAGULANT MGMT FOR PT TAKING WARFARIN: ICD-10-PCS | Mod: S$GLB,,,

## 2023-08-04 PROCEDURE — 93793 ANTICOAG MGMT PT WARFARIN: CPT | Mod: S$GLB,,,

## 2023-08-04 NOTE — PROGRESS NOTES
Patient held warfarin dose 7/31 then took 1 tablet (5 mg) every day including 8/03, no other changes reported

## 2023-08-04 NOTE — PROGRESS NOTES
[facility]    2023 1:07 PM    Assessment/Plan:    Patient presents today with therapeutic INR.    Assessment of patient findings and chart review: no significant findings     Recommendation for patient's warfarin regimen: Continue current maintenance dose    Recommend repeat INR Monday  _________________________________________________________________    Rocco NATALIE France (67 y.o.) is followed by the [employer] [facility]tico[facility].    Anticoagulation Summary  As of 2023      INR goal:  1.5-2.0   TTR:  49.9 % (2.3 y)   INR used for dosin.5 (8/3/2023)   Warfarin maintenance plan:  2.5 mg (5 mg x 0.5) every Mon, Thu; 5 mg (5 mg x 1) all other days   Weekly warfarin total:  30 mg   Plan last modified:  Deb Jenkins, PharmD (2023)   Next INR check:  2023   Target end date:      Indications    LVAD (left ventricular assist device) present [Z95.811]                 Anticoagulation Episode Summary       INR check location:      Preferred lab:      Send INR reminders to:  [facility] COUMADIN LVAD    Comments:  LVAD // Home Health   [phone]  // [facility] ph [phone]/ results Lab - [phone]          Anticoagulation Care Providers       Provider Role Specialty Phone number    Pete Garnett MD [facility] Cardiology [phone]

## 2023-08-07 ENCOUNTER — ANTI-COAG VISIT (OUTPATIENT)
Dept: CARDIOLOGY | Facility: CLINIC | Age: 68
End: 2023-08-07
Payer: MEDICARE

## 2023-08-07 DIAGNOSIS — Z95.811 LVAD (LEFT VENTRICULAR ASSIST DEVICE) PRESENT: Primary | ICD-10-CM

## 2023-08-07 LAB — INR PPP: 1.4

## 2023-08-07 PROCEDURE — 93793 ANTICOAG MGMT PT WARFARIN: CPT | Mod: S$GLB,,,

## 2023-08-07 PROCEDURE — 93793 PR ANTICOAGULANT MGMT FOR PT TAKING WARFARIN: ICD-10-PCS | Mod: S$GLB,,,

## 2023-08-07 NOTE — PROGRESS NOTES
[facility]    2023 9:06 AM    Assessment/Plan:    Patient presents today with subtherapeutic  INR. INR just slightly below goal and has been recently trending high requiring adjustment.    Assessment of patient findings and chart review: no significant findings     Recommendation for patient's warfarin regimen: Continue current maintenance dose    Recommend repeat INR Thursday.  _________________________________________________________________    Rocco France (67 y.o.) is followed by the [employer] [facility].    Anticoagulation Summary  As of 2023      INR goal:  1.5-2.0   TTR:  49.7 % (2.3 y)   INR used for dosin.4 (2023)   Warfarin maintenance plan:  2.5 mg (5 mg x 0.5) every Mon, Fri; 5 mg (5 mg x 1) all other days   Weekly warfarin total:  30 mg   Plan last modified:  Cheryl Barahona, PharmD (2023)   Next INR check:  8/10/2023   Target end date:      Indications    LVAD (left ventricular assist device) present [Z95.811]                 Anticoagulation Episode Summary       INR check location:      Preferred lab:      Send INR reminders to:  [facility] COUMADIN LVAD    Comments:  LVAD // Home Health [address] [place] [address] [phone]  // [facility] ph [phone]/ results Lab - [phone]          Anticoagulation Care Providers       Provider Role Specialty Phone number    Pete Garnett MD [facility] Cardiology [phone]

## 2023-08-10 ENCOUNTER — ANTI-COAG VISIT (OUTPATIENT)
Dept: CARDIOLOGY | Facility: CLINIC | Age: 68
End: 2023-08-10
Payer: MEDICARE

## 2023-08-10 DIAGNOSIS — Z95.811 LVAD (LEFT VENTRICULAR ASSIST DEVICE) PRESENT: Primary | ICD-10-CM

## 2023-08-10 LAB — INR PPP: 1.6

## 2023-08-10 PROCEDURE — 93793 PR ANTICOAGULANT MGMT FOR PT TAKING WARFARIN: ICD-10-PCS | Mod: S$GLB,,,

## 2023-08-10 PROCEDURE — 93793 ANTICOAG MGMT PT WARFARIN: CPT | Mod: S$GLB,,,

## 2023-08-10 NOTE — PROGRESS NOTES
[facility]    08/10/2023 9:37 AM    Assessment/Plan:    Patient presents today with therapeutic INR.    Assessment of patient findings and chart review: no significant findings     Recommendation for patient's warfarin regimen: Continue current maintenance dose    Recommend repeat INR Monday  _________________________________________________________________    Rocco ESTRADA Román (67 y.o.) is followed by the [facility].    Anticoagulation Summary  As of 8/10/2023      INR goal:  1.5-2.0   TTR:  49.7 % (2.3 y)   INR used for dosin.6 (8/10/2023)   Warfarin maintenance plan:  2.5 mg (5 mg x 0.5) every Mon, Fri; 5 mg (5 mg x 1) all other days   Weekly warfarin total:  30 mg   Plan last modified:  Cheryl Barahona, PharmD (2023)   Next INR check:  2023   Target end date:      Indications    LVAD (left ventricular assist device) present [Z95.811]                 Anticoagulation Episode Summary       INR check location:      Preferred lab:      Send INR reminders to:  [facility] COUMADIN LVAD    Comments:  LVAD // Home Health   [phone]  // [facility] ph [phone]/ results Lab - [phone]          Anticoagulation Care Providers       Provider Role Specialty Phone number    Pete Garnett MD [facility] Cardiology [phone]

## 2023-08-14 ENCOUNTER — ANTI-COAG VISIT (OUTPATIENT)
Dept: CARDIOLOGY | Facility: CLINIC | Age: 68
End: 2023-08-14
Payer: MEDICARE

## 2023-08-14 DIAGNOSIS — Z95.811 LVAD (LEFT VENTRICULAR ASSIST DEVICE) PRESENT: Primary | ICD-10-CM

## 2023-08-14 LAB — INR PPP: 1.6

## 2023-08-14 PROCEDURE — 93793 ANTICOAG MGMT PT WARFARIN: CPT | Mod: S$GLB,,,

## 2023-08-14 PROCEDURE — 93793 PR ANTICOAGULANT MGMT FOR PT TAKING WARFARIN: ICD-10-PCS | Mod: S$GLB,,,

## 2023-08-14 NOTE — PROGRESS NOTES
[facility]    2023 1:28 PM    Assessment/Plan:    Patient presents today with therapeutic INR.    Assessment of patient findings and chart review: no significant findings     Recommendation for patient's warfarin regimen: Continue current maintenance dose    Recommend repeat INR in 1 week  _________________________________________________________________    Rocco NATALIE France (67 y.o.) is followed by the [employer] [facility].    Anticoagulation Summary  As of 2023      INR goal:  1.5-2.0   TTR:  49.9 % (2.4 y)   INR used for dosin.6 (2023)   Warfarin maintenance plan:  2.5 mg (5 mg x 0.5) every Mon, Fri; 5 mg (5 mg x 1) all other days   Weekly warfarin total:  30 mg   Plan last modified:  Cheryl Barahona, PharmD (2023)   Next INR check:  2023   Target end date:      Indications    LVAD (left ventricular assist device) present [Z95.811]                 Anticoagulation Episode Summary       INR check location:      Preferred lab:      Send INR reminders to:  [facility] COUMADIN LVAD    Comments:  LVAD // Home Health   [phone]  // [facility] ph [phone]/ results Lab - [phone]          Anticoagulation Care Providers       Provider Role Specialty Phone number    Pete Garnett MD [facility] Cardiology [phone]

## 2023-08-21 ENCOUNTER — ANTI-COAG VISIT (OUTPATIENT)
Dept: CARDIOLOGY | Facility: CLINIC | Age: 68
End: 2023-08-21
Payer: MEDICARE

## 2023-08-21 DIAGNOSIS — Z95.811 LVAD (LEFT VENTRICULAR ASSIST DEVICE) PRESENT: Primary | ICD-10-CM

## 2023-08-21 LAB — INR PPP: 1.7

## 2023-08-21 PROCEDURE — 93793 ANTICOAG MGMT PT WARFARIN: CPT | Mod: S$GLB,,,

## 2023-08-21 PROCEDURE — 93793 PR ANTICOAGULANT MGMT FOR PT TAKING WARFARIN: ICD-10-PCS | Mod: S$GLB,,,

## 2023-08-21 NOTE — PROGRESS NOTES
[facility]    2023 12:42 PM    Assessment/Plan:    Patient presents today with therapeutic INR.    Assessment of patient findings and chart review: no significant findings     Recommendation for patient's warfarin regimen: Continue current maintenance dose    Recommend repeat INR in 1 week  _________________________________________________________________    Rocco NATALIE France (67 y.o.) is followed by the [employer] [facility].    Anticoagulation Summary  As of 2023      INR goal:  1.5-2.0   TTR:  50.3 % (2.4 y)   INR used for dosin.7 (2023)   Warfarin maintenance plan:  2.5 mg (5 mg x 0.5) every Mon, Fri; 5 mg (5 mg x 1) all other days   Weekly warfarin total:  30 mg   Plan last modified:  Cheryl Barahona, PharmD (2023)   Next INR check:  2023   Target end date:      Indications    LVAD (left ventricular assist device) present [Z95.811]                 Anticoagulation Episode Summary       INR check location:      Preferred lab:      Send INR reminders to:  [facility] COUMADIN LVAD    Comments:  LVAD // Home Health   [phone]  // [facility] ph [phone]/ results Lab - [phone]          Anticoagulation Care Providers       Provider Role Specialty Phone number    Pete Garnett MD [facility] Cardiology [phone]

## 2023-08-29 ENCOUNTER — ANTI-COAG VISIT (OUTPATIENT)
Dept: CARDIOLOGY | Facility: CLINIC | Age: 68
End: 2023-08-29
Payer: MEDICARE

## 2023-08-29 DIAGNOSIS — Z95.811 LVAD (LEFT VENTRICULAR ASSIST DEVICE) PRESENT: Primary | ICD-10-CM

## 2023-08-29 LAB — INR PPP: 1.7

## 2023-08-29 PROCEDURE — 93793 PR ANTICOAGULANT MGMT FOR PT TAKING WARFARIN: ICD-10-PCS | Mod: S$GLB,,,

## 2023-08-29 PROCEDURE — 93793 ANTICOAG MGMT PT WARFARIN: CPT | Mod: S$GLB,,,

## 2023-08-29 NOTE — PROGRESS NOTES
[facility]    2023 11:30 AM    Assessment/Plan:    Patient presents today with therapeutic INR.    Assessment of patient findings and chart review: no significant findings     Recommendation for patient's warfarin regimen: Continue current maintenance dose    Recommend repeat INR in 1 week  _________________________________________________________________    Rocco NATALIE France (67 y.o.) is followed by the [employer] [facility].    Anticoagulation Summary  As of 2023      INR goal:  1.5-2.0   TTR:  50.7 % (2.4 y)   INR used for dosin.7 (2023)   Warfarin maintenance plan:  2.5 mg (5 mg x 0.5) every Mon, Fri; 5 mg (5 mg x 1) all other days   Weekly warfarin total:  30 mg   Plan last modified:  Cheryl Barahona, PharmD (2023)   Next INR check:  2023   Target end date:      Indications    LVAD (left ventricular assist device) present [Z95.811]                 Anticoagulation Episode Summary       INR check location:      Preferred lab:      Send INR reminders to:  [facility] COUMADIN LVAD    Comments:  LVAD // Home Health   [phone]  // [facility] ph [phone]/ results Lab - [phone]          Anticoagulation Care Providers       Provider Role Specialty Phone number    Pete Garnett MD [facility] Cardiology [phone]

## 2023-09-06 ENCOUNTER — ANTI-COAG VISIT (OUTPATIENT)
Dept: CARDIOLOGY | Facility: CLINIC | Age: 68
End: 2023-09-06
Payer: MEDICARE

## 2023-09-06 DIAGNOSIS — Z95.811 LVAD (LEFT VENTRICULAR ASSIST DEVICE) PRESENT: Primary | ICD-10-CM

## 2023-09-06 LAB — INR PPP: 1.5

## 2023-09-06 PROCEDURE — 93793 PR ANTICOAGULANT MGMT FOR PT TAKING WARFARIN: ICD-10-PCS | Mod: S$GLB,,,

## 2023-09-06 PROCEDURE — 93793 ANTICOAG MGMT PT WARFARIN: CPT | Mod: S$GLB,,,

## 2023-09-06 NOTE — PROGRESS NOTES
[facility]    2023 8:48 AM    Assessment/Plan:    Patient presents today with therapeutic INR.    Assessment of patient findings and chart review: no significant findings     Recommendation for patient's warfarin regimen: Continue current maintenance dose    Recommend repeat INR in 1 week  _________________________________________________________________    Rocco NATALIE France (67 y.o.) is followed by the [employer] [facility]tico[facility] Management Program.    Anticoagulation Summary  As of 2023      INR goal:  1.5-2.0   TTR:  51.2 % (2.4 y)   INR used for dosin.5 (2023)   Warfarin maintenance plan:  2.5 mg (5 mg x 0.5) every Mon, Fri; 5 mg (5 mg x 1) all other days   Weekly warfarin total:  30 mg   Plan last modified:  Cheryl Barhaona, PharmD (2023)   Next INR check:  2023   Target end date:      Indications    LVAD (left ventricular assist device) present [Z95.811]                 Anticoagulation Episode Summary       INR check location:      Preferred lab:      Send INR reminders to:  [facility] COUMADIN LVAD    Comments:  LVAD // Home Health   [phone]  // [facility] ph [phone]/ results Lab - [phone]          Anticoagulation Care Providers       Provider Role Specialty Phone number    Pete Garnett MD [facility] Cardiology [phone]

## 2023-09-13 ENCOUNTER — ANTI-COAG VISIT (OUTPATIENT)
Dept: CARDIOLOGY | Facility: CLINIC | Age: 68
End: 2023-09-13
Payer: MEDICARE

## 2023-09-13 DIAGNOSIS — Z95.811 LVAD (LEFT VENTRICULAR ASSIST DEVICE) PRESENT: Primary | ICD-10-CM

## 2023-09-13 LAB — INR PPP: 1.5

## 2023-09-13 PROCEDURE — 93793 ANTICOAG MGMT PT WARFARIN: CPT | Mod: S$GLB,,,

## 2023-09-13 PROCEDURE — 93793 PR ANTICOAGULANT MGMT FOR PT TAKING WARFARIN: ICD-10-PCS | Mod: S$GLB,,,

## 2023-09-21 ENCOUNTER — ANTI-COAG VISIT (OUTPATIENT)
Dept: CARDIOLOGY | Facility: CLINIC | Age: 68
End: 2023-09-21
Payer: MEDICARE

## 2023-09-21 DIAGNOSIS — Z95.811 LVAD (LEFT VENTRICULAR ASSIST DEVICE) PRESENT: Primary | ICD-10-CM

## 2023-09-21 LAB — INR PPP: 1.7

## 2023-09-21 PROCEDURE — 93793 ANTICOAG MGMT PT WARFARIN: CPT | Mod: S$GLB,,,

## 2023-09-21 PROCEDURE — 93793 PR ANTICOAGULANT MGMT FOR PT TAKING WARFARIN: ICD-10-PCS | Mod: S$GLB,,,

## 2023-09-21 NOTE — PROGRESS NOTES
[facility]    2023 9:58 AM    Assessment/Plan:    Patient presents today with therapeutic INR.    Assessment of patient findings and chart review: no significant findings     Recommendation for patient's warfarin regimen: Continue current maintenance dose    Recommend repeat INR in 1 week  _________________________________________________________________    Rocco NATALIE France (68 y.o.) is followed by the [employer] [facility].    Anticoagulation Summary  As of 2023      INR goal:  1.5-2.0   TTR:  52.0 % (2.5 y)   INR used for dosin.7 (2023)   Warfarin maintenance plan:  2.5 mg (5 mg x 0.5) every Mon, Fri; 5 mg (5 mg x 1) all other days   Weekly warfarin total:  30 mg   Plan last modified:  Cheyrl Barahona, PharmD (2023)   Next INR check:  2023   Target end date:      Indications    LVAD (left ventricular assist device) present [Z95.811]                 Anticoagulation Episode Summary       INR check location:      Preferred lab:      Send INR reminders to:  [facility] COUMADIN LVAD    Comments:  LVAD // Home Health   [phone]  // [facility] ph [phone]/ results Lab - [phone]          Anticoagulation Care Providers       Provider Role Specialty Phone number    Pete Garnett MD [facility] Cardiology [phone]

## 2023-09-25 ENCOUNTER — TELEPHONE (OUTPATIENT)
Dept: TRANSPLANT | Facility: CLINIC | Age: 68
End: 2023-09-25
Payer: MEDICARE

## 2023-09-25 NOTE — TELEPHONE ENCOUNTER
Attempted to speak with patient regarding equipment maintenance due at upcoming clinic appointment on 10/4/2023.  Left voicemail asking patient to bring MPU with them to next appointment for maintenance.  It is medically necessary to ensure patient has properly functioning equipment and wearables to prevent infection, injury or death to patient.

## 2023-09-28 ENCOUNTER — ANTI-COAG VISIT (OUTPATIENT)
Dept: CARDIOLOGY | Facility: CLINIC | Age: 68
End: 2023-09-28
Payer: MEDICARE

## 2023-09-28 DIAGNOSIS — Z95.811 LVAD (LEFT VENTRICULAR ASSIST DEVICE) PRESENT: Primary | ICD-10-CM

## 2023-09-28 LAB — INR PPP: 1.8

## 2023-09-28 PROCEDURE — 93793 PR ANTICOAGULANT MGMT FOR PT TAKING WARFARIN: ICD-10-PCS | Mod: S$GLB,,,

## 2023-09-28 PROCEDURE — 93793 ANTICOAG MGMT PT WARFARIN: CPT | Mod: S$GLB,,,

## 2023-09-28 NOTE — PROGRESS NOTES
[facility]    2023 10:31 AM    Assessment/Plan:    Patient presents today with therapeutic INR.    Assessment of patient findings and chart review: no significant findings     Recommendation for patient's warfarin regimen: Continue current maintenance dose    Recommend repeat INR in 1 week  _________________________________________________________________    Rocco NATALIE France (68 y.o.) is followed by the [employer] [facility].    Anticoagulation Summary  As of 2023      INR goal:  1.5-2.0   TTR:  52.4 % (2.5 y)   INR used for dosin.8 (2023)   Warfarin maintenance plan:  2.5 mg (5 mg x 0.5) every Mon, Fri; 5 mg (5 mg x 1) all other days   Weekly warfarin total:  30 mg   Plan last modified:  Cheryl Barahona, PharmD (2023)   Next INR check:  10/4/2023   Target end date:      Indications    LVAD (left ventricular assist device) present [Z95.811]                 Anticoagulation Episode Summary       INR check location:      Preferred lab:      Send INR reminders to:  [facility] COUMADIN LVAD    Comments:  LVAD // Home Health   [phone]  // [facility] ph [phone]/ results Lab - [phone]          Anticoagulation Care Providers       Provider Role Specialty Phone number    Pete Garnett MD [facility] Cardiology [phone]

## 2023-10-04 ENCOUNTER — ANTI-COAG VISIT (OUTPATIENT)
Dept: CARDIOLOGY | Facility: CLINIC | Age: 68
End: 2023-10-04
Payer: MEDICARE

## 2023-10-04 ENCOUNTER — CLINICAL SUPPORT (OUTPATIENT)
Dept: TRANSPLANT | Facility: CLINIC | Age: 68
End: 2023-10-04
Payer: MEDICARE

## 2023-10-04 ENCOUNTER — OFFICE VISIT (OUTPATIENT)
Dept: TRANSPLANT | Facility: CLINIC | Age: 68
End: 2023-10-04
Payer: MEDICARE

## 2023-10-04 ENCOUNTER — LAB VISIT (OUTPATIENT)
Dept: LAB | Facility: HOSPITAL | Age: 68
End: 2023-10-04
Attending: INTERNAL MEDICINE
Payer: MEDICARE

## 2023-10-04 VITALS — BODY MASS INDEX: 21.3 KG/M2 | SYSTOLIC BLOOD PRESSURE: 120 MMHG | WEIGHT: 166 LBS | TEMPERATURE: 98 F | HEIGHT: 74 IN

## 2023-10-04 DIAGNOSIS — T82.7XXA INFECTION ASSOCIATED WITH DRIVELINE OF LEFT VENTRICULAR ASSIST DEVICE (LVAD): Primary | ICD-10-CM

## 2023-10-04 DIAGNOSIS — Z95.811 LVAD (LEFT VENTRICULAR ASSIST DEVICE) PRESENT: Primary | ICD-10-CM

## 2023-10-04 DIAGNOSIS — Z95.811 HEART REPLACED BY HEART ASSIST DEVICE: ICD-10-CM

## 2023-10-04 DIAGNOSIS — Z95.811 HEART REPLACED BY HEART ASSIST DEVICE: Primary | ICD-10-CM

## 2023-10-04 DIAGNOSIS — I10 ESSENTIAL HYPERTENSION: ICD-10-CM

## 2023-10-04 LAB
ALBUMIN SERPL BCP-MCNC: 3.1 G/DL (ref 3.5–5.2)
ALP SERPL-CCNC: 99 U/L (ref 55–135)
ALT SERPL W/O P-5'-P-CCNC: <5 U/L (ref 10–44)
ANION GAP SERPL CALC-SCNC: 7 MMOL/L (ref 8–16)
AST SERPL-CCNC: 10 U/L (ref 10–40)
BASOPHILS # BLD AUTO: 0.05 K/UL (ref 0–0.2)
BASOPHILS NFR BLD: 0.5 % (ref 0–1.9)
BILIRUB DIRECT SERPL-MCNC: 0.2 MG/DL (ref 0.1–0.3)
BILIRUB SERPL-MCNC: 0.5 MG/DL (ref 0.1–1)
BNP SERPL-MCNC: 132 PG/ML (ref 0–99)
BUN SERPL-MCNC: 19 MG/DL (ref 8–23)
CALCIUM SERPL-MCNC: 9 MG/DL (ref 8.7–10.5)
CHLORIDE SERPL-SCNC: 109 MMOL/L (ref 95–110)
CO2 SERPL-SCNC: 24 MMOL/L (ref 23–29)
CREAT SERPL-MCNC: 1 MG/DL (ref 0.5–1.4)
CRP SERPL-MCNC: 44.7 MG/L (ref 0–8.2)
DIFFERENTIAL METHOD: ABNORMAL
EOSINOPHIL # BLD AUTO: 0.3 K/UL (ref 0–0.5)
EOSINOPHIL NFR BLD: 2.4 % (ref 0–8)
ERYTHROCYTE [DISTWIDTH] IN BLOOD BY AUTOMATED COUNT: 14.6 % (ref 11.5–14.5)
EST. GFR  (NO RACE VARIABLE): >60 ML/MIN/1.73 M^2
GLUCOSE SERPL-MCNC: 100 MG/DL (ref 70–110)
GRAM STN SPEC: NORMAL
GRAM STN SPEC: NORMAL
HCT VFR BLD AUTO: 30.8 % (ref 40–54)
HGB BLD-MCNC: 9.6 G/DL (ref 14–18)
IMM GRANULOCYTES # BLD AUTO: 0.04 K/UL (ref 0–0.04)
IMM GRANULOCYTES NFR BLD AUTO: 0.4 % (ref 0–0.5)
INR PPP: 2.8 (ref 0.8–1.2)
LDH SERPL L TO P-CCNC: 162 U/L (ref 110–260)
LYMPHOCYTES # BLD AUTO: 2.4 K/UL (ref 1–4.8)
LYMPHOCYTES NFR BLD: 21.6 % (ref 18–48)
MAGNESIUM SERPL-MCNC: 2.3 MG/DL (ref 1.6–2.6)
MCH RBC QN AUTO: 28.5 PG (ref 27–31)
MCHC RBC AUTO-ENTMCNC: 31.2 G/DL (ref 32–36)
MCV RBC AUTO: 91 FL (ref 82–98)
MONOCYTES # BLD AUTO: 0.9 K/UL (ref 0.3–1)
MONOCYTES NFR BLD: 8.5 % (ref 4–15)
NEUTROPHILS # BLD AUTO: 7.2 K/UL (ref 1.8–7.7)
NEUTROPHILS NFR BLD: 66.6 % (ref 38–73)
NRBC BLD-RTO: 0 /100 WBC
PHOSPHATE SERPL-MCNC: 2.4 MG/DL (ref 2.7–4.5)
PLATELET # BLD AUTO: 244 K/UL (ref 150–450)
PMV BLD AUTO: 10.4 FL (ref 9.2–12.9)
POTASSIUM SERPL-SCNC: 3.9 MMOL/L (ref 3.5–5.1)
PREALB SERPL-MCNC: 11 MG/DL (ref 20–43)
PROT SERPL-MCNC: 9.6 G/DL (ref 6–8.4)
PROTHROMBIN TIME: 27.9 SEC (ref 9–12.5)
RBC # BLD AUTO: 3.37 M/UL (ref 4.6–6.2)
SODIUM SERPL-SCNC: 140 MMOL/L (ref 136–145)
WBC # BLD AUTO: 10.86 K/UL (ref 3.9–12.7)

## 2023-10-04 PROCEDURE — 87076 CULTURE ANAEROBE IDENT EACH: CPT | Performed by: INTERNAL MEDICINE

## 2023-10-04 PROCEDURE — 99214 PR OFFICE/OUTPT VISIT, EST, LEVL IV, 30-39 MIN: ICD-10-PCS | Mod: S$GLB,,, | Performed by: INTERNAL MEDICINE

## 2023-10-04 PROCEDURE — 3044F HG A1C LEVEL LT 7.0%: CPT | Mod: CPTII,S$GLB,, | Performed by: INTERNAL MEDICINE

## 2023-10-04 PROCEDURE — 1101F PR PT FALLS ASSESS DOC 0-1 FALLS W/OUT INJ PAST YR: ICD-10-PCS | Mod: CPTII,S$GLB,, | Performed by: INTERNAL MEDICINE

## 2023-10-04 PROCEDURE — 36415 COLL VENOUS BLD VENIPUNCTURE: CPT | Performed by: INTERNAL MEDICINE

## 2023-10-04 PROCEDURE — 1157F PR ADVANCE CARE PLAN OR EQUIV PRESENT IN MEDICAL RECORD: ICD-10-PCS | Mod: CPTII,S$GLB,, | Performed by: INTERNAL MEDICINE

## 2023-10-04 PROCEDURE — 87070 CULTURE OTHR SPECIMN AEROBIC: CPT | Performed by: INTERNAL MEDICINE

## 2023-10-04 PROCEDURE — 83615 LACTATE (LD) (LDH) ENZYME: CPT | Performed by: INTERNAL MEDICINE

## 2023-10-04 PROCEDURE — 99214 OFFICE O/P EST MOD 30 MIN: CPT | Mod: S$GLB,,, | Performed by: INTERNAL MEDICINE

## 2023-10-04 PROCEDURE — 84134 ASSAY OF PREALBUMIN: CPT | Performed by: INTERNAL MEDICINE

## 2023-10-04 PROCEDURE — 1157F ADVNC CARE PLAN IN RCRD: CPT | Mod: CPTII,S$GLB,, | Performed by: INTERNAL MEDICINE

## 2023-10-04 PROCEDURE — 83880 ASSAY OF NATRIURETIC PEPTIDE: CPT | Performed by: INTERNAL MEDICINE

## 2023-10-04 PROCEDURE — 1159F MED LIST DOCD IN RCRD: CPT | Mod: CPTII,S$GLB,, | Performed by: INTERNAL MEDICINE

## 2023-10-04 PROCEDURE — 87077 CULTURE AEROBIC IDENTIFY: CPT | Performed by: INTERNAL MEDICINE

## 2023-10-04 PROCEDURE — 93750 OP LVAD INTERROGATION: ICD-10-PCS | Mod: S$GLB,,, | Performed by: INTERNAL MEDICINE

## 2023-10-04 PROCEDURE — 85610 PROTHROMBIN TIME: CPT | Performed by: INTERNAL MEDICINE

## 2023-10-04 PROCEDURE — 1101F PT FALLS ASSESS-DOCD LE1/YR: CPT | Mod: CPTII,S$GLB,, | Performed by: INTERNAL MEDICINE

## 2023-10-04 PROCEDURE — 80053 COMPREHEN METABOLIC PANEL: CPT | Performed by: INTERNAL MEDICINE

## 2023-10-04 PROCEDURE — 87186 SC STD MICRODIL/AGAR DIL: CPT | Performed by: INTERNAL MEDICINE

## 2023-10-04 PROCEDURE — 86140 C-REACTIVE PROTEIN: CPT | Performed by: INTERNAL MEDICINE

## 2023-10-04 PROCEDURE — 83735 ASSAY OF MAGNESIUM: CPT | Performed by: INTERNAL MEDICINE

## 2023-10-04 PROCEDURE — 99999 PR PBB SHADOW E&M-EST. PATIENT-LVL III: CPT | Mod: PBBFAC,,, | Performed by: INTERNAL MEDICINE

## 2023-10-04 PROCEDURE — 3008F BODY MASS INDEX DOCD: CPT | Mod: CPTII,S$GLB,, | Performed by: INTERNAL MEDICINE

## 2023-10-04 PROCEDURE — 1159F PR MEDICATION LIST DOCUMENTED IN MEDICAL RECORD: ICD-10-PCS | Mod: CPTII,S$GLB,, | Performed by: INTERNAL MEDICINE

## 2023-10-04 PROCEDURE — 99999 PR PBB SHADOW E&M-EST. PATIENT-LVL III: ICD-10-PCS | Mod: PBBFAC,,, | Performed by: INTERNAL MEDICINE

## 2023-10-04 PROCEDURE — 3044F PR MOST RECENT HEMOGLOBIN A1C LEVEL <7.0%: ICD-10-PCS | Mod: CPTII,S$GLB,, | Performed by: INTERNAL MEDICINE

## 2023-10-04 PROCEDURE — 3008F PR BODY MASS INDEX (BMI) DOCUMENTED: ICD-10-PCS | Mod: CPTII,S$GLB,, | Performed by: INTERNAL MEDICINE

## 2023-10-04 PROCEDURE — 85025 COMPLETE CBC W/AUTO DIFF WBC: CPT | Performed by: INTERNAL MEDICINE

## 2023-10-04 PROCEDURE — 82248 BILIRUBIN DIRECT: CPT | Performed by: INTERNAL MEDICINE

## 2023-10-04 PROCEDURE — 1126F PR PAIN SEVERITY QUANTIFIED, NO PAIN PRESENT: ICD-10-PCS | Mod: CPTII,S$GLB,, | Performed by: INTERNAL MEDICINE

## 2023-10-04 PROCEDURE — 87075 CULTR BACTERIA EXCEPT BLOOD: CPT | Performed by: INTERNAL MEDICINE

## 2023-10-04 PROCEDURE — 87205 SMEAR GRAM STAIN: CPT | Performed by: INTERNAL MEDICINE

## 2023-10-04 PROCEDURE — 84100 ASSAY OF PHOSPHORUS: CPT | Performed by: INTERNAL MEDICINE

## 2023-10-04 PROCEDURE — 93750 INTERROGATION VAD IN PERSON: CPT | Mod: S$GLB,,, | Performed by: INTERNAL MEDICINE

## 2023-10-04 PROCEDURE — 1126F AMNT PAIN NOTED NONE PRSNT: CPT | Mod: CPTII,S$GLB,, | Performed by: INTERNAL MEDICINE

## 2023-10-04 PROCEDURE — 3288F FALL RISK ASSESSMENT DOCD: CPT | Mod: CPTII,S$GLB,, | Performed by: INTERNAL MEDICINE

## 2023-10-04 PROCEDURE — 3288F PR FALLS RISK ASSESSMENT DOCUMENTED: ICD-10-PCS | Mod: CPTII,S$GLB,, | Performed by: INTERNAL MEDICINE

## 2023-10-04 RX ORDER — AMLODIPINE BESYLATE 5 MG/1
10 TABLET ORAL DAILY
Qty: 60 TABLET | Refills: 11 | Status: SHIPPED | OUTPATIENT
Start: 2023-10-04 | End: 2023-12-11 | Stop reason: SDUPTHER

## 2023-10-04 RX ORDER — DOXYCYCLINE HYCLATE 100 MG
100 TABLET ORAL EVERY 12 HOURS
Status: DISCONTINUED | OUTPATIENT
Start: 2023-10-04 | End: 2023-10-05

## 2023-10-04 RX ORDER — DOXYCYCLINE 100 MG/1
100 CAPSULE ORAL EVERY 12 HOURS
Qty: 60 CAPSULE | Refills: 1 | Status: SHIPPED | OUTPATIENT
Start: 2023-10-04 | End: 2023-12-07

## 2023-10-04 RX ORDER — CIPROFLOXACIN 500 MG/1
500 TABLET ORAL 2 TIMES DAILY
Qty: 30 TABLET | Refills: 1 | Status: SHIPPED | OUTPATIENT
Start: 2023-10-04 | End: 2023-12-07

## 2023-10-04 RX ORDER — ATORVASTATIN CALCIUM 80 MG/1
80 TABLET, FILM COATED ORAL DAILY
Qty: 90 TABLET | Refills: 3 | Status: SHIPPED | OUTPATIENT
Start: 2023-10-04

## 2023-10-04 NOTE — LETTER
October 5, 2023        Teresa Mendoza  [address]  [address]  [place] [address]  Phone: [phone]  Fax: [phone]             [facility]  [address]  [place] [address]  Phone: [phone]   Patient: Rocco France   MR Number: [id number]   YOB: 1955   Date of Visit: 10/4/2023       Dear Dr. Teresa Mendoza    Thank you for referring Rocco France to me for evaluation. Attached you will find relevant portions of my assessment and plan of care.    If you have questions, please do not hesitate to call me. I look forward to following Rocco France along with you.    Sincerely,    Deana Lake MD    Enclosure    If you would like to receive this communication electronically, please contact [email or web address] or [phone] to request [employer] access.    [employer] is a tool which provides read-only access to select patient information with whom you have a relationship. Its easy to use and provides real time access to review your patients record including encounter summaries, notes, results, and demographic information.    If you feel you have received this communication in error or would no longer like to receive these types of communications, please e-mail [email or web address]

## 2023-10-04 NOTE — PROCEDURES
10/4/2023     9:38 AM 7/6/2023     9:06 AM 5/23/2023     4:28 AM 5/22/2023    11:52 PM 5/22/2023     8:52 PM 5/22/2023     8:11 AM 5/22/2023     4:23 AM   [facility] INTERROGATIONS   Type HeartMate3 HeartMate3        Flow 4 3.7        Speed 5200 5200        PI 8.3 8.9        Power (Edwards) 3.7 3.6        LSL 4800 4800        Pulsatility Pulse Intermittent pulse Pulse Pulse Pulse Pulse Pulse   }

## 2023-10-04 NOTE — PROGRESS NOTES
Date of Implant with Heartmate 3 LVAD: 01/13/2021    PATIENT ARRIVED IN CLINIC:  Ambulatory   Accompanied by: Spouse  Complaints/reason for visit today: routine    Vitals  Temperature, oral:   Temp Readings from Last 1 Encounters:   10/04/23 98.3 °F (36.8 °C) (Oral)     Blood Pressure:   BP Readings from Last 3 Encounters:   10/04/23 (!) 120/0   07/06/23 (!) 92/0   05/23/23 (!) 78/0        VAD Interrogation:      10/4/2023     9:38 AM 7/6/2023     9:06 AM 5/23/2023     4:28 AM   TXP JACEY INTERROGATIONS   Type HeartMate3 HeartMate3    Flow 4 3.7    Speed 5200 5200    PI 8.3 8.9    Power (Edwards) 3.7 3.6    LSL 4800 4800    Pulsatility Pulse Intermittent pulse Pulse     HCT:   Lab Results   Component Value Date    HCT 30.8 (L) 10/04/2023    HCT 30 (L) 03/22/2022         Problems / Issues / Alarms with VAD if any: None noted  VAD Sounds: HM3 Smooth    VAD Binder With Patient: No  Reviewed VAD Numbers In Binder: No    Equipment:  Emergency Equipment With Patient: Yes  Any Equipment Issues: None noted   It is medically necessary to ensure patient has properly functioning equipment and wearables to prevent infection, injury or death to patient.     DLES Assessment:  Appearance Of Driveline: 2 with drainage and not incorporated.  Antibiotics: No, Pt started on 2 weeks of CIPRO and DOXY on 10/04/2023  Velour: No  Manual & Visual Inspection Of Driveline: No kinks or tears noted  Stabilization Device In Use: yes, [place] securement device    Heartmate 3 Module Cable:  No yellow exposed             Assessment/ Quality of Life Sur[place]:   Complaints Of Nausea / Vomiting: None noted    Appearance and Frequency Of Stools: normal and formed without blood & daily  Color Of Urine: clear/yellow  Pain: NO  Coping/Depression/Anxiety: coping okay  Sleep Habits: 4 hrs /night  Sleep Aids:     Showering: Yes, reminded to change dressing immediately after drying off  Self- Care I have no problems with self- care  Mobility I have no problems  walking about  Usual Activities I have no problems with performing my usual activities  Activity/Exercise: Walking; 7 days a week  Driving: Yes. Reminded to pull over should there be an alarm before looking down at controller.      DLES Dressing Care:   Frequency of Dressing Changes: every other day & daily kit  Pt In Need Of Management Kits?:no   It is medically necessary to have VAD management kits in order to prevent infection or to assist in the healing of an infected DLES.    Labs:    Chemistry        Component Value Date/Time     10/04/2023 0828    K 3.9 10/04/2023 0828     10/04/2023 0828    CO2 24 10/04/2023 0828    BUN 19 10/04/2023 0828    CREATININE 1.0 10/04/2023 0828     10/04/2023 0828        Component Value Date/Time    CALCIUM 9.0 10/04/2023 0828    ALKPHOS 99 10/04/2023 0828    AST 10 10/04/2023 0828    ALT <5 (L) 10/04/2023 0828    BILITOT 0.5 10/04/2023 0828    ESTGFRAFRICA 55.3 (A) 07/13/2022 0834    EGFRNONAA 47.8 (A) 07/13/2022 0834            Magnesium   Date Value Ref Range Status   10/04/2023 2.3 1.6 - 2.6 mg/dL Final       Lab Results   Component Value Date    WBC 10.86 10/04/2023    HGB 9.6 (L) 10/04/2023    HCT 30.8 (L) 10/04/2023    MCV 91 10/04/2023     10/04/2023       Lab Results   Component Value Date    INR 2.8 (H) 10/04/2023    INR 1.8 09/28/2023    INR 1.7 09/21/2023       BNP   Date Value Ref Range Status   10/04/2023 132 (H) 0 - 99 pg/mL Final     Comment:     Values of less than 100 pg/ml are consistent with non-CHF populations.   07/06/2023 150 (H) 0 - 99 pg/mL Final     Comment:     Values of less than 100 pg/ml are consistent with non-CHF populations.   04/05/2023 108 (H) 0 - 99 pg/mL Final     Comment:     Values of less than 100 pg/ml are consistent with non-CHF populations.       LD   Date Value Ref Range Status   10/04/2023 162 110 - 260 U/L Final     Comment:     Results are increased in hemolyzed samples.   07/06/2023 138 110 - 260 U/L Final      Comment:     Results are increased in hemolyzed samples.   05/23/2023 177 110 - 260 U/L Final     Comment:     Results are increased in hemolyzed samples.       Labs reviewed with patient: YES     Medication Reconciliation: per MA.  New Medication Detail Provided:  New Medications discussed with Dr. Lake.  Coumadin Managed by: [facility]    Education: Reviewed driveline care, emergency procedures, how to change the controller, alarms with patient, as well as discussed how to page the VAD coordinator in case of an emergency.   Educated patient/family that chest compressions are allowed in the event they are needed.    Reminded patient/caregiver not to touch their face and to cover their mouth when they cough or sneeze.   Vaccines: Pt informed that we are encouraging all VAD patients to receive the Flu and COVID vaccines and boosters. Informed pt that they can take tylenol but should avoid other NSAIDs.       Plans/Needs: Pt here for routine visit with spose. Pt started on 2 weeks of DOXY and CIPRO. DLES seen with drainage and not incorporated, DLES cultured. Pt's BP retaken 119/87 MAP 99 Dpler 98/0.     Hurricane Season: Yes, discussed with patient: With hurricane season approaching, we want to make sure you are fully prepared for any emergency.  Should the National Weather Service or your local authorities recommend a voluntary or mandatory evacuation of your area, The VAD team requires you to evacuate to a safe place.  Remember, when it is a mandatory evacuation, traffic will become an issue for your limited battery power.  Therefore, we strongly urge you to evacuate early.      The VAD team advises you to have the following in place before hurricane season:  Have an evacuation plan in place including places to evacuate, names and phone numbers.  This information is required to be given to the VAD coordinator.  Have your VAD emergency contact numbers with you.  Make sure your prescriptions will  not run out by the end of September.  Make sure you have enough medications, including pills, inhalers, patches,     etc. to take, should you be gone for more than 2 weeks.  Make sure ALL of your batteries are fully charged.  Bring enough dressing change supplies to last for at least 2 weeks.  Bring your VAD binder with you.  Make sure your binder is updated and complete with alarms reference card, patient hand book, emergency contact numbers, daily log sheets, etc.    If you do not have family or friends as an evacuation destination, we recommend evacuating to a safe area.   Do NOT evacuate to [facility].  The VAD team wants to stress the importance of planning for your evacuation in the event of a hurricane.  If you have any LVAD questions or issues, please contact the LVAD coordinator.

## 2023-10-04 NOTE — PROGRESS NOTES
Patient was seen today in clinic. Patient did not bring in his MPU to his appointment for maintenance after being asked to do so. Patient was issued a new holster vest size medium today. Patient was also educated on monthly equipment cleaning and issued a new monthly checklist.      Equipment:  Any Equipment Issues: None noted     Emergency Bag  Emergency Equipment With Patient: Yes  Condition of emergency bag: Good  Contents of emergency bag: Backup controller, 2 fully charged batteries, 2 battery clips, reference cards    Maintenance Tracking  Patient has monthly checklist today: No  Patient correctly utilizing monthly checklist: No  Educated patient on utilizing monthly checklist correctly and importance of this: Yes    Equipment Inspection  Inspected patient's equipment today: Yes  Equipment clean and free of debris, including locking mechanism: Yes  Cleaned equipment today and educated patient on how to do this: Yes    Manual and visual inspection of driveline: No  Kinks, rescue tape, tears: No  Rescue tape applied: No  Clamshell repair: No  Perc lead repair: No    Equipment wearables worn by patient today (consolidation bag, vest, other): Holster vest  Condition of this: Okay    Equipment Issued  Holster vest: [id number]    Equipment Needs  Other equipment issues: None  Equipment given to patient today in clinic: Holster vest  Discussed with MCS Coordinator and physician: Yes  Items Under Warranty Yes VAD Coordinator Notified Yes  Equipment loaned to patient today: No  It is medically necessary to ensure patient has properly functioning equipment and wearables to prevent infection, injury or death to patient.   Waveforms sent: No

## 2023-10-04 NOTE — PROGRESS NOTES
[facility]    10/04/2023 1:26 PM    Assessment/Plan:    Patient presents today with supratherapeutic INR.    Assessment of patient findings and chart review: Will be starting cipro and doxy today    Recommendation for patient's warfarin regimen: Hold dose today then resume current maintenance dose    Recommend repeat INR Friday  _________________________________________________________________    Rocco ESTRADA Román (68 y.o.) is followed by the [facility].    Anticoagulation Summary  As of 10/4/2023      INR goal:  1.5-2.0   TTR:  52.2 % (2.5 y)   INR used for dosin.8 (10/4/2023)   Warfarin maintenance plan:  2.5 mg (5 mg x 0.5) every Mon, Fri; 5 mg (5 mg x 1) all other days   Weekly warfarin total:  30 mg   Plan last modified:  Cheryl Barahona, PharmD (2023)   Next INR check:  10/6/2023   Target end date:      Indications    LVAD (left ventricular assist device) present [Z95.811]                 Anticoagulation Episode Summary       INR check location:      Preferred lab:      Send INR reminders to:  [facility] COUMADIN LVAD    Comments:  LVAD // Home Health   [phone]  // [facility] ph [phone]/ results Lab - [phone]          Anticoagulation Care Providers       Provider Role Specialty Phone number    Pete Garnett MD [facility] Cardiology [phone]

## 2023-10-05 ENCOUNTER — TELEPHONE (OUTPATIENT)
Dept: TRANSPLANT | Facility: CLINIC | Age: 68
End: 2023-10-05
Payer: MEDICARE

## 2023-10-05 NOTE — TELEPHONE ENCOUNTER
----- Message from Gabrielle Garcia sent at 10/5/2023 10:51 AM CDT -----  Regarding: Drug interaction  Pharmacy called to advising you that Warfarin the pt is Rx, has an interaction with both drugs below.     ciprofloxacin HCl (CIPRO) 500 MG tablet    doxycycline tablet 100 mg    Contact @ [facility] [phone]     Thanks

## 2023-10-05 NOTE — PROGRESS NOTES
Subjective:   Patient ID:  Rocco France is a 68 y.o. male who presents for LVAD followup visit.    Implant Date: 1/13/2021  Initials: [facility]     Heartmate 3 RPM 5200     INR goal: 2-2.5  Bridge with Heparin   Antiplatelets:  stopped d/t SDH        10/4/2023     9:38 AM   TXP JACEY INTERROGATIONS   Type HeartMate3   Flow 4   Speed 5200   PI 8.3   Power (Edwards) 3.7   LSL 4800   Pulsatility Pulse   Interrogation of Ventricular assist device was performed with physician analysis of device parameters and review of device function. I have personally reviewed the interrogation findings and agree with findings as stated.     HPI  69 yo BM with stage D CHF due to NICM underwent HM3 implantation 1/13/2021 with sternal closure 1/14/2021.  He was admitted for syncope 1/27/2022 at which time he was found to have a evolving right cerebral acute subdural hematoma and acute basal cistern subarachnoid hemorrhage. He requiring intubation with prolonged hospitalization and was incidentally found to be positive for COVID-19 and treated with remdesivir. He was taken off of aspirin and discharged home 2/17/2022. He was readmitted 3/2022 for 3 days due to dysequilibrium, diplopia and repeat CT head was stable. Neurology recommended myasthenia gravis workup which was negative.  He was last seen in clinic April 2023 at which time the body of the note reflects a different dose of amlodipine and lisinopril then the patient list included in the note.  Patient does not recall what his medications were at that time but when he was readmitted to the hospital May 2023 following a fall the available information indicates that he was on lisinopril 5 mg and amlodipine 5 mg both daily.  He and his wife report the home health nurse helps with his blood pressure.      Patient overall seems a little more irritable than usual. Took his meds this AM per patient, we did discontinue BP meds previously due to low BP but diffiicult to confirm what he is doing  "with his meds, his wife is with him and she is not sure he took meds this AM. Otherwise patient looks thin but states he is doing well, eating okay. Patient denies N/V/F/C, lightheadedness, dizziness, PND, orthopnea, LE edema, abdominal pain, abdominal pressure, chest pain, chest pressure, syncope, pre-syncope. Additionally patient has no bleeding, no bright red blood per rectum, melena, no GI symptoms at all.  NYHA FC II.    Review of Systems   Constitutional: Positive for malaise/fatigue. Negative for chills, fever, weight gain and weight loss.   Cardiovascular:  Positive for dyspnea on exertion. Negative for chest pain, claudication, irregular heartbeat, leg swelling, near-syncope, orthopnea, palpitations, paroxysmal nocturnal dyspnea and syncope.   Respiratory:  Negative for cough, shortness of breath, sputum production and wheezing.    Hematologic/Lymphatic: Negative for bleeding problem. Does not bruise/bleed easily.   Musculoskeletal:  Negative for falls (not since May 2023).   Gastrointestinal:  Negative for change in bowel habit, hematemesis, hematochezia and melena.   Genitourinary:  Negative for hematuria.   Neurological:  Positive for weakness. Negative for brief paralysis, dizziness (not lately), focal weakness, headaches and light-headedness (not lately).       Objective:   Blood pressure (!) 120/0, temperature 98.3 °F (36.8 °C), temperature source Oral, height 6' 2" (1.88 m), weight 75.3 kg (166 lb).body mass index is 21.31 kg/m².     Physical Exam  Constitutional:       General: He is not in acute distress.     Appearance: He is well-developed. He is not ill-appearing, toxic-appearing or diaphoretic.   HENT:      Head: Normocephalic and atraumatic.   Eyes:      General: No scleral icterus.        Right eye: No discharge.         Left eye: No discharge.      Conjunctiva/sclera: Conjunctivae normal.   Neck:      Thyroid: No thyromegaly.      Vascular: No JVD.      Trachea: No tracheal deviation. "   Cardiovascular:      Comments: Normal LVAD sounds; DL 2, not incorporated, drainage present, culture taken  Pulmonary:      Effort: Pulmonary effort is normal. No respiratory distress.      Breath sounds: Normal breath sounds. No wheezing or rales.   Abdominal:      General: Bowel sounds are normal. There is no distension.      Palpations: Abdomen is soft. There is no mass.      Tenderness: There is no abdominal tenderness. There is no guarding or rebound.   Musculoskeletal:         General: No swelling or tenderness.      Right lower leg: No edema.      Left lower leg: No edema.   Skin:     General: Skin is warm and dry.   Neurological:      General: No focal deficit present.      Mental Status: He is alert. Mental status is at baseline.   Psychiatric:         Mood and Affect: Mood normal.         Behavior: Behavior normal.         Thought Content: Thought content normal.         Judgment: Judgment normal.         Lab Results   Component Value Date     (H) 10/04/2023     10/04/2023    K 3.9 10/04/2023    MG 2.3 10/04/2023     10/04/2023    CO2 24 10/04/2023    PHOS 2.4 (L) 10/04/2023    BUN 19 10/04/2023    CREATININE 1.0 10/04/2023     10/04/2023    HGBA1C 6.6 (H) 05/19/2023    AST 10 10/04/2023    ALT <5 (L) 10/04/2023    ALBUMIN 3.1 (L) 10/04/2023    PROT 9.6 (H) 10/04/2023    BILITOT 0.5 10/04/2023    WBC 10.86 10/04/2023    HGB 9.6 (L) 10/04/2023    HCT 30.8 (L) 10/04/2023    HCT 30 (L) 03/22/2022     10/04/2023    INR 2.8 (H) 10/04/2023    INR 1.8 09/28/2023     10/04/2023    TSH 0.906 05/18/2023    CHOL 127 04/05/2023    HDL 36 (L) 04/05/2023    LDLCALC 76.6 04/05/2023    TRIG 72 04/05/2023     Assessment:      1. 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 Heart replaced by heart assist device    3. Essential hypertension        Plan:   Patient stable, not sure but seems he is doing his own dressing changes, and per VC looks  significant, needs to start oral abx, start cipro and doxy oral, will followup on cultures and see if abx need to be adjusted. Will hopefully see an improvement with abx.  Concerned slightly about how irritable he is, palliative care consult as per their team, he has been seeing them.   Discussing goals of care, he wants to continue to live as long as possible but his independence is important to him, he and his wife seemed to bicker quite a bit today, had not seen that before but been a long time since I have seen him in clinic.   Pt's BP retaken 119/87 MAP 99 Dpler 98/0- decided given previous symptoms and hypotension and wife stating she really did not think he had taken his blood pressure meds, will see what it does in a couple of weeks.   Target MAP 65-90 mm Hg    Continue to work on activity and strength    Patient is now NYHA II    Recommend 2 gram sodium restriction and 1500cc fluid restriction.    Encourage physical activity with graded exercise program.  Requested patient to weigh themselves daily, and to notify us if their weight increases by more than 3 lbs in 1 day or 5 lbs in 1 week.     Listed for transplant: No    UNOS Patient Status  Functional Status: 80% - Normal activity with effort: some symptoms of disease  Physical Capacity: Limited Mobility but does not use cane or wheelchair just takes his time walking and moving around  Working for Income: No  If no, reason not working: Patient Choice - Retired

## 2023-10-05 NOTE — TELEPHONE ENCOUNTER
Phone call returned and explained we are aware of interactions of medications. Lana verbalized understanding.

## 2023-10-06 ENCOUNTER — ANTI-COAG VISIT (OUTPATIENT)
Dept: CARDIOLOGY | Facility: CLINIC | Age: 68
End: 2023-10-06
Payer: MEDICARE

## 2023-10-06 DIAGNOSIS — Z95.811 LVAD (LEFT VENTRICULAR ASSIST DEVICE) PRESENT: Primary | ICD-10-CM

## 2023-10-06 LAB
BACTERIA SPEC AEROBE CULT: ABNORMAL
INR PPP: 3.1

## 2023-10-06 PROCEDURE — 93793 ANTICOAG MGMT PT WARFARIN: CPT | Mod: S$GLB,,,

## 2023-10-06 PROCEDURE — 93793 PR ANTICOAGULANT MGMT FOR PT TAKING WARFARIN: ICD-10-PCS | Mod: S$GLB,,,

## 2023-10-06 NOTE — PROGRESS NOTES
Patient was cultured in clinic and started on 2 weeks of Doxy/Cipro, will you please review and let us know if any changes should be made?

## 2023-10-06 NOTE — PROGRESS NOTES
[facility]    10/06/2023 12:13 PM    Assessment/Plan:    Patient presents today with supratherapeutic INR.    Assessment of patient findings and chart review: Recently started cipro and doxy    Recommendation for patient's warfarin regimen: Hold dose today then decrease maintenance dose    Recommend repeat INR Monday  _________________________________________________________________    Rocco ESTRADA Román (68 y.o.) is followed by the [facility].    Anticoagulation Summary  As of 10/6/2023      INR goal:  1.5-2.0   TTR:  52.1 % (2.5 y)   INR used for dosing:  3.1 (10/6/2023)   Warfarin maintenance plan:  2.5 mg (5 mg x 0.5) every Mon, Wed, Fri; 5 mg (5 mg x 1) all other days   Weekly warfarin total:  27.5 mg   Plan last modified:  Cheryl Barahona, PharmD (10/6/2023)   Next INR check:  10/9/2023   Target end date:      Indications    LVAD (left ventricular assist device) present [Z95.811]                 Anticoagulation Episode Summary       INR check location:      Preferred lab:      Send INR reminders to:  [facility] COUMADIN LVAD    Comments:  LVAD // Home Health 2000 [phone]  //[facility] ph [phone]/ results Lab - [phone]          Anticoagulation Care Providers       Provider Role Specialty Phone number    Pete Garnett MD [facility] Cardiology [phone]

## 2023-10-06 NOTE — PROGRESS NOTES
Patient called and reported not sure if he held dose Wed. -10/04, took 5 mg yesterday 10/05, was off cholesterol medicine but was resumed yesterday 10/05, no other changes reported

## 2023-10-09 ENCOUNTER — ANTI-COAG VISIT (OUTPATIENT)
Dept: CARDIOLOGY | Facility: CLINIC | Age: 68
End: 2023-10-09
Payer: MEDICARE

## 2023-10-09 ENCOUNTER — OFFICE VISIT (OUTPATIENT)
Dept: INFECTIOUS DISEASES | Facility: CLINIC | Age: 68
End: 2023-10-09
Payer: MEDICARE

## 2023-10-09 VITALS — TEMPERATURE: 98 F | HEIGHT: 74 IN | BODY MASS INDEX: 22.72 KG/M2 | WEIGHT: 177 LBS

## 2023-10-09 DIAGNOSIS — A49.8 PSEUDOMONAS AERUGINOSA INFECTION: ICD-10-CM

## 2023-10-09 DIAGNOSIS — T82.7XXA INFECTION ASSOCIATED WITH DRIVELINE OF LEFT VENTRICULAR ASSIST DEVICE (LVAD): Primary | ICD-10-CM

## 2023-10-09 DIAGNOSIS — L24.4 IRRITANT CONTACT DERMATITIS DUE TO DRUG IN CONTACT WITH SKIN: ICD-10-CM

## 2023-10-09 DIAGNOSIS — Z95.811 LVAD (LEFT VENTRICULAR ASSIST DEVICE) PRESENT: Primary | ICD-10-CM

## 2023-10-09 LAB
BACTERIA SPEC ANAEROBE CULT: NORMAL
INR PPP: 2.1

## 2023-10-09 PROCEDURE — 3044F HG A1C LEVEL LT 7.0%: CPT | Mod: CPTII,S$GLB,, | Performed by: INTERNAL MEDICINE

## 2023-10-09 PROCEDURE — 1157F ADVNC CARE PLAN IN RCRD: CPT | Mod: CPTII,S$GLB,, | Performed by: INTERNAL MEDICINE

## 2023-10-09 PROCEDURE — 93793 ANTICOAG MGMT PT WARFARIN: CPT | Mod: S$GLB,,,

## 2023-10-09 PROCEDURE — 1101F PR PT FALLS ASSESS DOC 0-1 FALLS W/OUT INJ PAST YR: ICD-10-PCS | Mod: CPTII,S$GLB,, | Performed by: INTERNAL MEDICINE

## 2023-10-09 PROCEDURE — 99214 OFFICE O/P EST MOD 30 MIN: CPT | Mod: S$GLB,,, | Performed by: INTERNAL MEDICINE

## 2023-10-09 PROCEDURE — 3288F FALL RISK ASSESSMENT DOCD: CPT | Mod: CPTII,S$GLB,, | Performed by: INTERNAL MEDICINE

## 2023-10-09 PROCEDURE — 93793 PR ANTICOAGULANT MGMT FOR PT TAKING WARFARIN: ICD-10-PCS | Mod: S$GLB,,,

## 2023-10-09 PROCEDURE — 1126F PR PAIN SEVERITY QUANTIFIED, NO PAIN PRESENT: ICD-10-PCS | Mod: CPTII,S$GLB,, | Performed by: INTERNAL MEDICINE

## 2023-10-09 PROCEDURE — 1101F PT FALLS ASSESS-DOCD LE1/YR: CPT | Mod: CPTII,S$GLB,, | Performed by: INTERNAL MEDICINE

## 2023-10-09 PROCEDURE — 99214 PR OFFICE/OUTPT VISIT, EST, LEVL IV, 30-39 MIN: ICD-10-PCS | Mod: S$GLB,,, | Performed by: INTERNAL MEDICINE

## 2023-10-09 PROCEDURE — 3008F PR BODY MASS INDEX (BMI) DOCUMENTED: ICD-10-PCS | Mod: CPTII,S$GLB,, | Performed by: INTERNAL MEDICINE

## 2023-10-09 PROCEDURE — 3288F PR FALLS RISK ASSESSMENT DOCUMENTED: ICD-10-PCS | Mod: CPTII,S$GLB,, | Performed by: INTERNAL MEDICINE

## 2023-10-09 PROCEDURE — 1126F AMNT PAIN NOTED NONE PRSNT: CPT | Mod: CPTII,S$GLB,, | Performed by: INTERNAL MEDICINE

## 2023-10-09 PROCEDURE — 1157F PR ADVANCE CARE PLAN OR EQUIV PRESENT IN MEDICAL RECORD: ICD-10-PCS | Mod: CPTII,S$GLB,, | Performed by: INTERNAL MEDICINE

## 2023-10-09 PROCEDURE — 1159F MED LIST DOCD IN RCRD: CPT | Mod: CPTII,S$GLB,, | Performed by: INTERNAL MEDICINE

## 2023-10-09 PROCEDURE — 99999 PR PBB SHADOW E&M-EST. PATIENT-LVL III: CPT | Mod: PBBFAC,,, | Performed by: INTERNAL MEDICINE

## 2023-10-09 PROCEDURE — 1160F RVW MEDS BY RX/DR IN RCRD: CPT | Mod: CPTII,S$GLB,, | Performed by: INTERNAL MEDICINE

## 2023-10-09 PROCEDURE — 99999 PR PBB SHADOW E&M-EST. PATIENT-LVL III: ICD-10-PCS | Mod: PBBFAC,,, | Performed by: INTERNAL MEDICINE

## 2023-10-09 PROCEDURE — 1160F PR REVIEW ALL MEDS BY PRESCRIBER/CLIN PHARMACIST DOCUMENTED: ICD-10-PCS | Mod: CPTII,S$GLB,, | Performed by: INTERNAL MEDICINE

## 2023-10-09 PROCEDURE — 1159F PR MEDICATION LIST DOCUMENTED IN MEDICAL RECORD: ICD-10-PCS | Mod: CPTII,S$GLB,, | Performed by: INTERNAL MEDICINE

## 2023-10-09 PROCEDURE — 3008F BODY MASS INDEX DOCD: CPT | Mod: CPTII,S$GLB,, | Performed by: INTERNAL MEDICINE

## 2023-10-09 PROCEDURE — 3044F PR MOST RECENT HEMOGLOBIN A1C LEVEL <7.0%: ICD-10-PCS | Mod: CPTII,S$GLB,, | Performed by: INTERNAL MEDICINE

## 2023-10-09 NOTE — PROGRESS NOTES
[facility]    10/09/2023 11:23 AM    Assessment/Plan:    Patient presents today with supratherapeutic INR.    Assessment of patient findings and chart review: Recently started cipro & doxy x 2 weeks    Recommendation for patient's warfarin regimen: Hold dose today then resume decreased maintenance dose    Recommend repeat INR Thursday  _________________________________________________________________    Rocco ESTRADA Román (68 y.o.) is followed by the [facility].    Anticoagulation Summary  As of 10/9/2023      INR goal:  1.5-2.0   TTR:  51.9 % (2.5 y)   INR used for dosin.1 (10/9/2023)   Warfarin maintenance plan:  2.5 mg (5 mg x 0.5) every Mon, Wed, Fri; 5 mg (5 mg x 1) all other days   Weekly warfarin total:  27.5 mg   Plan last modified:  Cheryl Barahona, PharmD (10/6/2023)   Next INR check:  10/12/2023   Target end date:      Indications    LVAD (left ventricular assist device) present [Z95.811]                 Anticoagulation Episode Summary       INR check location:      Preferred lab:      Send INR reminders to:  [facility] COUMADIN LVAD    Comments:  LVAD // Home Health 2000 [phone]  //[facility] ph [phone]/ results Lab - [phone]          Anticoagulation Care Providers       Provider Role Specialty Phone number    Pete Garnett MD [facility] Cardiology [phone]

## 2023-10-12 ENCOUNTER — ANTI-COAG VISIT (OUTPATIENT)
Dept: CARDIOLOGY | Facility: CLINIC | Age: 68
End: 2023-10-12
Payer: MEDICARE

## 2023-10-12 DIAGNOSIS — Z95.811 LVAD (LEFT VENTRICULAR ASSIST DEVICE) PRESENT: Primary | ICD-10-CM

## 2023-10-12 LAB — INR PPP: 1.7

## 2023-10-12 PROCEDURE — 93793 ANTICOAG MGMT PT WARFARIN: CPT | Mod: S$GLB,,,

## 2023-10-12 PROCEDURE — 93793 PR ANTICOAGULANT MGMT FOR PT TAKING WARFARIN: ICD-10-PCS | Mod: S$GLB,,,

## 2023-10-12 NOTE — PROGRESS NOTES
[facility]    10/12/2023 10:06 AM    Assessment/Plan:    Patient presents today with therapeutic INR.    Assessment of patient findings and chart review: INR has been trending high requiring adjustment but was on course of cipro and doxy which patient reports he stopped 10/9    Recommendation for patient's warfarin regimen: Continue current maintenance dose    Recommend repeat INR Monday  _________________________________________________________________    Rocco France (68 y.o.) is followed by the [facility].    Anticoagulation Summary  As of 10/12/2023      INR goal:  1.5-2.0   TTR:  52.0 % (2.5 y)   INR used for dosin.7 (10/12/2023)   Warfarin maintenance plan:  5 mg (5 mg x 1) every Tue, Thu, Sat; 2.5 mg (5 mg x 0.5) all other days   Weekly warfarin total:  25 mg   Plan last modified:  Cheryl Barahona, PharmD (10/12/2023)   Next INR check:  10/16/2023   Target end date:      Indications    LVAD (left ventricular assist device) present [Z95.811]                 Anticoagulation Episode Summary       INR check location:      Preferred lab:      Send INR reminders to:  [facility] COUMADIN LVAD    Comments:  LVAD // Home Health  [phone]  //[facility] ph [phone]/ results Lab - [phone]          Anticoagulation Care Providers       Provider Role Specialty Phone number    Pete Garnett MD [facility] Cardiology [phone]

## 2023-10-12 NOTE — PROGRESS NOTES
Patient called and verified correct dose, reports 10/09 stopped Cipro and Doxycycline, no other changes reported

## 2023-10-15 NOTE — PROGRESS NOTES
Infectious Disease Clinic  [facility]  Subjective:      Patient ID:. Rocco France is a 68 y.o. male       Chief Complaint:   Chief Complaint   Patient presents with    VAD infection       History of Present Illness    A 68 y.o. male patient with NICM, CAD, combined HF, Afib, s/p ICD, s/p LVAD on 1/13/21, and DM2 who is seen as a referral from VAD clinic for DLI. Mr. France was seen in LVAD clinic and noted to have yellow purulent secretions from his driveline exit site. Secretions were cultured and empiric Cipro/Doxycycline begun on that day. Cultures subsequently revealed P aeruginosa non susceptible to quinolones. He is seen today for follow up and discussion of IV therapy however he reports the secretions have resolved. He has begun to experience a pruritic rash of the skin surrounding the driveline.       Infection History:  October 4, 2021- DLI due to P aeruginosa, empiric Cipro plus doxycycline.    Review of Systems   Constitutional: Positive for weight loss. Negative for chills, decreased appetite, fever, malaise/fatigue, night sweats and weight gain.   HENT:  Negative for congestion, ear pain, hearing loss, hoarse voice, sore throat and tinnitus.    Eyes:  Positive for blurred vision. Negative for redness and visual disturbance.   Cardiovascular:  Negative for chest pain, leg swelling and palpitations.   Respiratory:  Negative for cough, hemoptysis, shortness of breath, sputum production and wheezing.    Hematologic/Lymphatic: Negative for adenopathy. Does not bruise/bleed easily.   Skin:  Positive for dry skin and itching. Negative for rash and suspicious lesions.   Musculoskeletal:  Positive for muscle weakness. Negative for back pain, joint pain, myalgias and neck pain.   Gastrointestinal:  Negative for abdominal pain, constipation, diarrhea, heartburn, nausea and vomiting.   Genitourinary:  Negative for dysuria, flank pain, frequency, hematuria, hesitancy and urgency.   Neurological:   Negative for dizziness, headaches, numbness, paresthesias and weakness.   Psychiatric/Behavioral:  Positive for depression. Negative for memory loss. The patient does not have insomnia and is not nervous/anxious.        Objective:     Physical Exam  Vitals and nursing note reviewed. Exam conducted with a chaperone present.   Constitutional:       Appearance: Normal appearance.   HENT:      Head: Normocephalic and atraumatic.   Eyes:      General: No scleral icterus.        Right eye: No discharge.         Left eye: No discharge.      Conjunctiva/sclera: Conjunctivae normal.   Cardiovascular:      Rate and Rhythm: Normal rate and regular rhythm.      Pulses: Normal pulses.      Heart sounds: No murmur heard.     Comments: VAD Humming sounds  Pulmonary:      Effort: Pulmonary effort is normal. No respiratory distress.      Breath sounds: Normal breath sounds. No stridor. No wheezing or rhonchi.   Abdominal:      General: There is no distension.      Palpations: Abdomen is soft.      Tenderness: There is no abdominal tenderness.      Comments: DLES without secretions. Skin surrounding the driveline with multiple papules which appear dry in the shape of the sterile dressing.    Musculoskeletal:         General: Normal range of motion.   Skin:     General: Skin is warm and dry.   Neurological:      General: No focal deficit present.      Mental Status: He is alert and oriented to person, place, and time.         Assessment:       ICD-10-CM ICD-9-CM   1. 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 Pseudomonas aeruginosa infection  A49.8 041.7   3. Irritant contact dermatitis due to drug in contact with skin  L24.4 692.3       Plan:   Patient with signs and symptoms consistent with driveline exit site infection which has improved with resolution of secretions despite susceptibilities showing antimicrobial resistance. The skin does show changes consistent with irritant  dermatitis likely secondary to chlorhexidine cleaning every day despite instructions by LVAD team for every other day dressing change. Management as orders below.   Discontinue Cipro and doxycycline based on susceptibilities and resolution of secretions.   Monitor site and if secretions recur patient or his wife will notify the clinic for PICC placement and IV Zosyn for OPAT.  Patient advised to change dressing every other day an duse calmoseptine ointment to help with irritant dermatitis.   Discussed management plan with VAD coordinator.   RTC as needed.

## 2023-10-16 ENCOUNTER — ANTI-COAG VISIT (OUTPATIENT)
Dept: CARDIOLOGY | Facility: CLINIC | Age: 68
End: 2023-10-16
Payer: MEDICARE

## 2023-10-16 DIAGNOSIS — Z95.811 LVAD (LEFT VENTRICULAR ASSIST DEVICE) PRESENT: Primary | ICD-10-CM

## 2023-10-16 LAB — INR PPP: 1.5

## 2023-10-16 PROCEDURE — 93793 ANTICOAG MGMT PT WARFARIN: CPT | Mod: S$GLB,,,

## 2023-10-16 PROCEDURE — 93793 PR ANTICOAGULANT MGMT FOR PT TAKING WARFARIN: ICD-10-PCS | Mod: S$GLB,,,

## 2023-10-16 NOTE — PROGRESS NOTES
[facility]    10/16/2023 10:51 AM    Assessment/Plan:    Patient presents today with therapeutic INR. Goal INR  1.5-2    Assessment of patient findings per MA/LPN and chart review: none    Recommendation for patient's warfarin regimen: Continue current maintenance dose    Repeat INR in 1 week.  _________________________________________________________________    Rocco France (68 y.o.) is followed by the [facility].    Anticoagulation Summary  As of 10/16/2023      INR goal:  1.5-2.0   TTR:  52.2 % (2.5 y)   INR used for dosin.5 (10/16/2023)   Warfarin maintenance plan:  2.5 mg (5 mg x 0.5) every Mon, Wed, Fri; 5 mg (5 mg x 1) all other days   Weekly warfarin total:  27.5 mg   Plan last modified:  Cheryl Barahona, PharmD (10/12/2023)   Next INR check:  10/23/2023   Target end date:      Indications    LVAD (left ventricular assist device) present [Z95.811]                 Anticoagulation Episode Summary       INR check location:      Preferred lab:      Send INR reminders to:  [facility] COUMADIN LVAD    Comments:  LVAD // Home Health  [phone]  //[facility] ph [phone]/ results Lab - [phone]          Anticoagulation Care Providers       Provider Role Specialty Phone number    Pete Garnett MD [facility] Cardiology [phone]                Cassidy Snider, PharmD, [facility]  Clinical Pharmacist - Coumadin Clinic  Preferred Contact: Secure Messaging or In Basket Message

## 2023-10-20 ENCOUNTER — TELEPHONE (OUTPATIENT)
Dept: TRANSPLANT | Facility: CLINIC | Age: 68
End: 2023-10-20
Payer: MEDICARE

## 2023-10-20 NOTE — TELEPHONE ENCOUNTER
Signed Home Health Orders has been faxed to [facility]  Date 10/20/23  Order Number(s): [id number]

## 2023-10-23 ENCOUNTER — ANTI-COAG VISIT (OUTPATIENT)
Dept: CARDIOLOGY | Facility: CLINIC | Age: 68
End: 2023-10-23
Payer: MEDICARE

## 2023-10-23 DIAGNOSIS — Z95.811 LVAD (LEFT VENTRICULAR ASSIST DEVICE) PRESENT: Primary | ICD-10-CM

## 2023-10-23 LAB — INR PPP: 1.9

## 2023-10-23 PROCEDURE — 93793 ANTICOAG MGMT PT WARFARIN: CPT | Mod: S$GLB,,,

## 2023-10-23 PROCEDURE — 93793 PR ANTICOAGULANT MGMT FOR PT TAKING WARFARIN: ICD-10-PCS | Mod: S$GLB,,,

## 2023-10-23 NOTE — PROGRESS NOTES
[facility]    10/23/2023 9:33 AM    Rocco France (68 y.o.) is followed by the [facility].      Anticoagulation Summary  As of 10/23/2023      INR goal:  1.5-2.0   TTR:  52.5 % (2.5 y)   INR used for dosin.9 (10/23/2023)   Warfarin maintenance plan:  2.5 mg (5 mg x 0.5) every Mon, Wed, Fri; 5 mg (5 mg x 1) all other days   Weekly warfarin total:  27.5 mg   Plan last modified:  Cheryl Barahona PharmD (10/12/2023)   Next INR check:  10/30/2023   Target end date:      Indications    LVAD (left ventricular assist device) present [Z95.811]                 Anticoagulation Episode Summary       INR check location:      Preferred lab:      Send INR reminders to:  [facility]    Comments:  LVAD // [place] [phone]  //[facility] ph [phone]/ results Lab - [phone]          Anticoagulation Care Providers       Provider Role Specialty Phone number    Pete Garnett MD [facility] Cardiology [phone]              Assessment/Plan:    Rocco France presents today with therapeutic INR. Goal INR  1.5-2    Assessment of patient findings per MA/LPN and chart review:   No significant findings noted upon review/questioning patient    Recommendation for patient's warfarin regimen:   No change was made to warfarin therapy during this visit and patient has been instructed to continue their current warfarin regimen.    Recommended repeat INR in 1 week      Cassidy Snider, PharmD, BCPS  Clinical Pharmacist - Coumadin Clinic  Preferred Contact: Secure Messaging or In Basket Message

## 2023-10-30 ENCOUNTER — ANTI-COAG VISIT (OUTPATIENT)
Dept: CARDIOLOGY | Facility: CLINIC | Age: 68
End: 2023-10-30
Payer: MEDICARE

## 2023-10-30 DIAGNOSIS — Z95.811 LVAD (LEFT VENTRICULAR ASSIST DEVICE) PRESENT: Primary | ICD-10-CM

## 2023-10-30 LAB — INR PPP: 1.7

## 2023-10-30 PROCEDURE — 93793 PR ANTICOAGULANT MGMT FOR PT TAKING WARFARIN: ICD-10-PCS | Mod: S$GLB,,,

## 2023-10-30 PROCEDURE — 93793 ANTICOAG MGMT PT WARFARIN: CPT | Mod: S$GLB,,,

## 2023-11-02 ENCOUNTER — PATIENT MESSAGE (OUTPATIENT)
Dept: TRANSPLANT | Facility: CLINIC | Age: 68
End: 2023-11-02
Payer: MEDICARE

## 2023-11-03 ENCOUNTER — PATIENT MESSAGE (OUTPATIENT)
Dept: CARDIOTHORACIC SURGERY | Facility: CLINIC | Age: 68
End: 2023-11-03
Payer: MEDICARE

## 2023-11-03 DIAGNOSIS — Z95.811 LVAD (LEFT VENTRICULAR ASSIST DEVICE) PRESENT: Primary | ICD-10-CM

## 2023-11-06 ENCOUNTER — ANTI-COAG VISIT (OUTPATIENT)
Dept: CARDIOLOGY | Facility: CLINIC | Age: 68
End: 2023-11-06
Payer: MEDICARE

## 2023-11-06 DIAGNOSIS — Z95.811 LVAD (LEFT VENTRICULAR ASSIST DEVICE) PRESENT: Primary | ICD-10-CM

## 2023-11-06 LAB — INR PPP: 1.8

## 2023-11-06 PROCEDURE — 93793 ANTICOAG MGMT PT WARFARIN: CPT | Mod: S$GLB,,,

## 2023-11-06 PROCEDURE — 93793 PR ANTICOAGULANT MGMT FOR PT TAKING WARFARIN: ICD-10-PCS | Mod: S$GLB,,,

## 2023-11-13 ENCOUNTER — ANTI-COAG VISIT (OUTPATIENT)
Dept: CARDIOLOGY | Facility: CLINIC | Age: 68
End: 2023-11-13
Payer: MEDICARE

## 2023-11-13 DIAGNOSIS — Z95.811 LVAD (LEFT VENTRICULAR ASSIST DEVICE) PRESENT: Primary | ICD-10-CM

## 2023-11-13 LAB — INR PPP: 1.6

## 2023-11-13 PROCEDURE — 93793 ANTICOAG MGMT PT WARFARIN: CPT | Mod: S$GLB,,,

## 2023-11-13 PROCEDURE — 93793 PR ANTICOAGULANT MGMT FOR PT TAKING WARFARIN: ICD-10-PCS | Mod: S$GLB,,,

## 2023-11-20 ENCOUNTER — ANTI-COAG VISIT (OUTPATIENT)
Dept: CARDIOLOGY | Facility: CLINIC | Age: 68
End: 2023-11-20
Payer: MEDICARE

## 2023-11-20 DIAGNOSIS — Z95.811 LVAD (LEFT VENTRICULAR ASSIST DEVICE) PRESENT: Primary | ICD-10-CM

## 2023-11-20 LAB — INR PPP: 1.5

## 2023-11-20 PROCEDURE — 93793 PR ANTICOAGULANT MGMT FOR PT TAKING WARFARIN: ICD-10-PCS | Mod: S$GLB,,,

## 2023-11-20 PROCEDURE — 93793 ANTICOAG MGMT PT WARFARIN: CPT | Mod: S$GLB,,,

## 2023-11-27 ENCOUNTER — ANTI-COAG VISIT (OUTPATIENT)
Dept: CARDIOLOGY | Facility: CLINIC | Age: 68
End: 2023-11-27
Payer: MEDICARE

## 2023-11-27 DIAGNOSIS — Z95.811 LVAD (LEFT VENTRICULAR ASSIST DEVICE) PRESENT: Primary | ICD-10-CM

## 2023-11-27 LAB — INR PPP: 1.8

## 2023-11-27 PROCEDURE — 93793 ANTICOAG MGMT PT WARFARIN: CPT | Mod: S$GLB,,,

## 2023-11-27 PROCEDURE — 93793 PR ANTICOAGULANT MGMT FOR PT TAKING WARFARIN: ICD-10-PCS | Mod: S$GLB,,,

## 2023-11-28 ENCOUNTER — TELEPHONE (OUTPATIENT)
Dept: TRANSPLANT | Facility: CLINIC | Age: 68
End: 2023-11-28
Payer: MEDICARE

## 2023-11-28 NOTE — TELEPHONE ENCOUNTER
Spoke with caregiver regarding equipment maintenance due at upcoming clinic appointment on 12/7/2023.  Asked caregiver to bring MPU with them to next appointment for maintenance.  Verbalized understanding and agreement.    It is medically necessary to ensure patient has properly functioning equipment and wearables to prevent infection, injury or death to patient.

## 2023-12-07 ENCOUNTER — CLINICAL SUPPORT (OUTPATIENT)
Dept: TRANSPLANT | Facility: CLINIC | Age: 68
End: 2023-12-07
Payer: MEDICARE

## 2023-12-07 ENCOUNTER — ANTI-COAG VISIT (OUTPATIENT)
Dept: CARDIOLOGY | Facility: CLINIC | Age: 68
End: 2023-12-07
Payer: MEDICARE

## 2023-12-07 ENCOUNTER — DOCUMENTATION ONLY (OUTPATIENT)
Dept: CARDIOTHORACIC SURGERY | Facility: CLINIC | Age: 68
End: 2023-12-07
Payer: MEDICARE

## 2023-12-07 ENCOUNTER — OFFICE VISIT (OUTPATIENT)
Dept: TRANSPLANT | Facility: CLINIC | Age: 68
End: 2023-12-07
Payer: MEDICARE

## 2023-12-07 ENCOUNTER — HOSPITAL ENCOUNTER (OUTPATIENT)
Dept: PULMONOLOGY | Facility: CLINIC | Age: 68
Discharge: HOME | End: 2023-12-07
Payer: MEDICARE

## 2023-12-07 ENCOUNTER — LAB VISIT (OUTPATIENT)
Dept: LAB | Facility: HOSPITAL | Age: 68
End: 2023-12-07
Attending: INTERNAL MEDICINE
Payer: MEDICARE

## 2023-12-07 VITALS
TEMPERATURE: 98 F | BODY MASS INDEX: 23.46 KG/M2 | HEIGHT: 73 IN | BODY MASS INDEX: 21.43 KG/M2 | HEIGHT: 74 IN | WEIGHT: 167 LBS | SYSTOLIC BLOOD PRESSURE: 88 MMHG | WEIGHT: 177 LBS

## 2023-12-07 DIAGNOSIS — Z95.811 HEART REPLACED BY HEART ASSIST DEVICE: ICD-10-CM

## 2023-12-07 DIAGNOSIS — Z95.811 LVAD (LEFT VENTRICULAR ASSIST DEVICE) PRESENT: Primary | ICD-10-CM

## 2023-12-07 DIAGNOSIS — Z95.811 HEART REPLACED BY HEART ASSIST DEVICE: Primary | ICD-10-CM

## 2023-12-07 DIAGNOSIS — I50.42 CHRONIC COMBINED SYSTOLIC AND DIASTOLIC HEART FAILURE: ICD-10-CM

## 2023-12-07 DIAGNOSIS — I10 ESSENTIAL HYPERTENSION: ICD-10-CM

## 2023-12-07 DIAGNOSIS — E78.5 HYPERLIPIDEMIA, UNSPECIFIED HYPERLIPIDEMIA TYPE: ICD-10-CM

## 2023-12-07 DIAGNOSIS — Z95.811 LVAD (LEFT VENTRICULAR ASSIST DEVICE) PRESENT: ICD-10-CM

## 2023-12-07 LAB
ALBUMIN SERPL BCP-MCNC: 3.2 G/DL (ref 3.5–5.2)
ALP SERPL-CCNC: 116 U/L (ref 55–135)
ALT SERPL W/O P-5'-P-CCNC: 8 U/L (ref 10–44)
ANION GAP SERPL CALC-SCNC: 10 MMOL/L (ref 8–16)
AST SERPL-CCNC: 12 U/L (ref 10–40)
BASOPHILS # BLD AUTO: 0.05 K/UL (ref 0–0.2)
BASOPHILS NFR BLD: 0.4 % (ref 0–1.9)
BILIRUB DIRECT SERPL-MCNC: 0.5 MG/DL (ref 0.1–0.3)
BILIRUB SERPL-MCNC: 1.3 MG/DL (ref 0.1–1)
BNP SERPL-MCNC: 517 PG/ML (ref 0–99)
BUN SERPL-MCNC: 11 MG/DL (ref 8–23)
CALCIUM SERPL-MCNC: 9.1 MG/DL (ref 8.7–10.5)
CHLORIDE SERPL-SCNC: 102 MMOL/L (ref 95–110)
CHOLEST SERPL-MCNC: 126 MG/DL (ref 120–199)
CHOLEST/HDLC SERPL: 3.2 {RATIO} (ref 2–5)
CO2 SERPL-SCNC: 27 MMOL/L (ref 23–29)
CREAT SERPL-MCNC: 0.8 MG/DL (ref 0.5–1.4)
CRP SERPL-MCNC: 45.6 MG/L (ref 0–8.2)
DIFFERENTIAL METHOD: ABNORMAL
EOSINOPHIL # BLD AUTO: 0.2 K/UL (ref 0–0.5)
EOSINOPHIL NFR BLD: 1.7 % (ref 0–8)
ERYTHROCYTE [DISTWIDTH] IN BLOOD BY AUTOMATED COUNT: 15.3 % (ref 11.5–14.5)
EST. GFR  (NO RACE VARIABLE): >60 ML/MIN/1.73 M^2
GLUCOSE SERPL-MCNC: 112 MG/DL (ref 70–110)
GRAM STN SPEC: NORMAL
GRAM STN SPEC: NORMAL
HCT VFR BLD AUTO: 34 % (ref 40–54)
HDLC SERPL-MCNC: 40 MG/DL (ref 40–75)
HDLC SERPL: 31.7 % (ref 20–50)
HGB BLD-MCNC: 10.6 G/DL (ref 14–18)
IMM GRANULOCYTES # BLD AUTO: 0.04 K/UL (ref 0–0.04)
IMM GRANULOCYTES NFR BLD AUTO: 0.3 % (ref 0–0.5)
INR PPP: 1.6 (ref 0.8–1.2)
LDH SERPL L TO P-CCNC: 161 U/L (ref 110–260)
LDLC SERPL CALC-MCNC: 72.8 MG/DL (ref 63–159)
LYMPHOCYTES # BLD AUTO: 2.4 K/UL (ref 1–4.8)
LYMPHOCYTES NFR BLD: 20.2 % (ref 18–48)
MAGNESIUM SERPL-MCNC: 1.9 MG/DL (ref 1.6–2.6)
MCH RBC QN AUTO: 28 PG (ref 27–31)
MCHC RBC AUTO-ENTMCNC: 31.2 G/DL (ref 32–36)
MCV RBC AUTO: 90 FL (ref 82–98)
MONOCYTES # BLD AUTO: 0.9 K/UL (ref 0.3–1)
MONOCYTES NFR BLD: 7.7 % (ref 4–15)
NEUTROPHILS # BLD AUTO: 8.2 K/UL (ref 1.8–7.7)
NEUTROPHILS NFR BLD: 69.7 % (ref 38–73)
NONHDLC SERPL-MCNC: 86 MG/DL
NRBC BLD-RTO: 0 /100 WBC
PHOSPHATE SERPL-MCNC: 2.4 MG/DL (ref 2.7–4.5)
PLATELET # BLD AUTO: 194 K/UL (ref 150–450)
PMV BLD AUTO: 11 FL (ref 9.2–12.9)
POTASSIUM SERPL-SCNC: 3.6 MMOL/L (ref 3.5–5.1)
PREALB SERPL-MCNC: 11 MG/DL (ref 20–43)
PROT SERPL-MCNC: 9.5 G/DL (ref 6–8.4)
PROTHROMBIN TIME: 16.4 SEC (ref 9–12.5)
RBC # BLD AUTO: 3.78 M/UL (ref 4.6–6.2)
SODIUM SERPL-SCNC: 139 MMOL/L (ref 136–145)
TRIGL SERPL-MCNC: 66 MG/DL (ref 30–150)
WBC # BLD AUTO: 11.81 K/UL (ref 3.9–12.7)

## 2023-12-07 PROCEDURE — 36415 COLL VENOUS BLD VENIPUNCTURE: CPT | Performed by: INTERNAL MEDICINE

## 2023-12-07 PROCEDURE — 83880 ASSAY OF NATRIURETIC PEPTIDE: CPT | Performed by: INTERNAL MEDICINE

## 2023-12-07 PROCEDURE — 94618 PULMONARY STRESS TESTING: ICD-10-PCS | Mod: S$GLB,,, | Performed by: INTERNAL MEDICINE

## 2023-12-07 PROCEDURE — 84100 ASSAY OF PHOSPHORUS: CPT | Performed by: INTERNAL MEDICINE

## 2023-12-07 PROCEDURE — 3008F BODY MASS INDEX DOCD: CPT | Mod: CPTII,S$GLB,, | Performed by: INTERNAL MEDICINE

## 2023-12-07 PROCEDURE — 83615 LACTATE (LD) (LDH) ENZYME: CPT | Performed by: INTERNAL MEDICINE

## 2023-12-07 PROCEDURE — 80061 LIPID PANEL: CPT | Performed by: INTERNAL MEDICINE

## 2023-12-07 PROCEDURE — 80053 COMPREHEN METABOLIC PANEL: CPT | Performed by: INTERNAL MEDICINE

## 2023-12-07 PROCEDURE — 99999 PR PBB SHADOW E&M-EST. PATIENT-LVL III: CPT | Mod: PBBFAC,,, | Performed by: INTERNAL MEDICINE

## 2023-12-07 PROCEDURE — 3288F FALL RISK ASSESSMENT DOCD: CPT | Mod: CPTII,S$GLB,, | Performed by: INTERNAL MEDICINE

## 2023-12-07 PROCEDURE — 87070 CULTURE OTHR SPECIMN AEROBIC: CPT | Performed by: INTERNAL MEDICINE

## 2023-12-07 PROCEDURE — 99214 OFFICE O/P EST MOD 30 MIN: CPT | Mod: S$GLB,,, | Performed by: INTERNAL MEDICINE

## 2023-12-07 PROCEDURE — 93793 PR ANTICOAGULANT MGMT FOR PT TAKING WARFARIN: ICD-10-PCS | Mod: S$GLB,,,

## 2023-12-07 PROCEDURE — 93793 ANTICOAG MGMT PT WARFARIN: CPT | Mod: S$GLB,,,

## 2023-12-07 PROCEDURE — 93750 INTERROGATION VAD IN PERSON: CPT | Mod: S$GLB,,, | Performed by: INTERNAL MEDICINE

## 2023-12-07 PROCEDURE — 87075 CULTR BACTERIA EXCEPT BLOOD: CPT | Performed by: INTERNAL MEDICINE

## 2023-12-07 PROCEDURE — 83735 ASSAY OF MAGNESIUM: CPT | Performed by: INTERNAL MEDICINE

## 2023-12-07 PROCEDURE — 86140 C-REACTIVE PROTEIN: CPT | Performed by: INTERNAL MEDICINE

## 2023-12-07 PROCEDURE — 1126F AMNT PAIN NOTED NONE PRSNT: CPT | Mod: CPTII,S$GLB,, | Performed by: INTERNAL MEDICINE

## 2023-12-07 PROCEDURE — 84134 ASSAY OF PREALBUMIN: CPT | Performed by: INTERNAL MEDICINE

## 2023-12-07 PROCEDURE — 82248 BILIRUBIN DIRECT: CPT | Performed by: INTERNAL MEDICINE

## 2023-12-07 PROCEDURE — 85025 COMPLETE CBC W/AUTO DIFF WBC: CPT | Performed by: INTERNAL MEDICINE

## 2023-12-07 PROCEDURE — 85610 PROTHROMBIN TIME: CPT | Performed by: INTERNAL MEDICINE

## 2023-12-07 PROCEDURE — 94618 PULMONARY STRESS TESTING: CPT | Mod: S$GLB,,, | Performed by: INTERNAL MEDICINE

## 2023-12-07 PROCEDURE — 87205 SMEAR GRAM STAIN: CPT | Performed by: INTERNAL MEDICINE

## 2023-12-07 PROCEDURE — 1101F PT FALLS ASSESS-DOCD LE1/YR: CPT | Mod: CPTII,S$GLB,, | Performed by: INTERNAL MEDICINE

## 2023-12-07 PROCEDURE — 1157F ADVNC CARE PLAN IN RCRD: CPT | Mod: CPTII,S$GLB,, | Performed by: INTERNAL MEDICINE

## 2023-12-07 RX ORDER — FERROUS GLUCONATE 324(37.5)
324 TABLET ORAL
Qty: 30 TABLET | Refills: 11 | Status: CANCELLED | OUTPATIENT
Start: 2023-12-07 | End: 2024-12-06

## 2023-12-07 RX ORDER — FUROSEMIDE 40 MG/1
40 TABLET ORAL DAILY
Qty: 30 TABLET | Refills: 11 | Status: CANCELLED | OUTPATIENT
Start: 2023-12-07 | End: 2024-12-06

## 2023-12-07 NOTE — PROCEDURES
Rocco France is a 68 y.o.  male patient, who presents for a 6 minute walk test ordered by MD Chloe.  The diagnosis is Left Ventricular Assist Device; Cardiomyopathy.  The patient's BMI is 23.4 kg/m2.  Predicted distance (lower limit of normal) is 383.81 meters.      Test Results:    The test was completed without stopping. The total time walked was 360 seconds. During walking, the patient reported:  No complaints. The patient used no assistive devices during testing.     12/07/2023---------Distance: 274.32 meters (900 feet)     O2 Sat % Supplemental Oxygen Heart Rate Blood Pressure Heath Scale   Pre-exercise  (Resting) 99 % Room Air 86 bpm 104/73 mmHg 0   During Exercise 100 % Room Air 97 bpm 107/75 mmHg 0   Post-exercise  (Recovery) 99 % Room Air  94 bpm       Recovery Time:    50 seconds               The patient walked the first 15 seconds in 5.12 seconds.    Performing nurse/tech: Tia CALABRESE      PREVIOUS STUDY:   07/06/2023---------Distance: 293.22 meters (962 feet)       O2 Sat % Supplemental Oxygen Heart Rate Blood Pressure Heath Scale   Pre-exercise  (Resting) 98 % Room Air 85 bpm 112/81 mmHg 0   During Exercise 95 % Room Air 98 bpm 120/86 mmHg 0   Post-exercise  (Recovery) 99 % Room Air  90 bpm          Recovery Time:  65 seconds               The patient walked the first 15 seconds in 4.59 seconds.      CLINICAL INTERPRETATION:  Six minute walk distance is 274.32 meters (900 feet) with no dyspnea.  During exercise, there was no desaturation while breathing room air.  Both blood pressure and heart rate remained stable with walking.  The patient did not report non-pulmonary symptoms during exercise.  Significant exercise impairment is likely due to subjective reasons.  Since the previous study in July 2023, exercise capacity is unchanged.  Based upon age and body mass index, exercise capacity is less than predicted.

## 2023-12-07 NOTE — LETTER
12/07/2023    To Whom It May Concern:    This letter is to inform you that one of your  patients has special healthcare needs.  The patient (whose contact information appears below) has a Heartmate 3 Left Ventricular Assist System (LVAD) or blood pump.  The device is implanted alongside the patients native heart.  It takes over the pumping function of the patients sick or weakened heart so that the patients lungs, organs, and tissues get the oxygen-rich blood they need. The LVAD is a life-sustaining device. The patient and their caregivers are fully trained on how to care for the LVAD. The patients information is as follows:  Rocco France  68 y.o. 1955  [address] [facility] [address]    They are medically cleared to proceed with routine dental cleaning, radiographs, or fillings/crowns/bridges.  If deep cleaning, extractions or dental work beyond routine cleaning is needed, please proceed with prophylactic antibiotics and encourage patient to notify their Coumadin Clinic providers of the medication in use. Please use Amoxacillin 2 grams x 1 dose prior to work. For patients with penicillin allergies please proceed  with preferred antibiotics at the Dental team discretion .  Please avoid stopping or altering their anticoagulation routine as is imperative to their LVAD.  Please contact the Coumadin Clinic directly ([phone]) to discuss coumadin dosage changes.     If local anesthetic is required, please use lidocaine without epinephrine. Patient may not receive deep sedation or general anesthesia. Please do not prescribe NSAID's, aspirin or any other antiplatelet agents. The patient may take acetaminophen based pain relievers.  If any other agents are required, please call VAD office to discuss prior to procedure.           Please contact the coumadin clinic for INR questions or warfarin dosages at [phone].     Please contact one of the VAD coordinators with any other questions or  concerns.  BRODIE DavisN, RN, CCRN, VAD-C [phone]   BRODIE ThomasN, RN, [facility] [phone]   Valentin Barrow, MSN, RN, [facility] [phone]   Kacey Barr, [phone]   VAD Office Fax: [phone]    If the patient has a medical emergency while under your care, please call 911 and page the LVAD coordinator on call via the  at [phone].     Sincerely,    Deana Lake M.D.  Medical Director, Mechanical Circulatory Device Support Program  Section of Cardiomyopathy & Heart Transplantation

## 2023-12-07 NOTE — PROGRESS NOTES
Date of Implant with Heartmate 3 LVAD: 1/13/2021    PATIENT ARRIVED IN CLINIC:  Ambulatory   Accompanied by: spouse  Complaints/reason for visit today: routine    Vitals  Temperature, oral:   Temp Readings from Last 1 Encounters:   10/09/23 98.1 °F (36.7 °C) (Oral)     Blood Pressure:   BP Readings from Last 3 Encounters:   10/04/23 (!) 120/0   07/06/23 (!) 92/0   05/23/23 (!) 78/0        VAD Interrogation:      12/7/2023    11:13 AM 10/4/2023     9:38 AM 7/6/2023     9:06 AM   TXP JACEY INTERROGATIONS   Type HeartMate3 HeartMate3 HeartMate3   Flow 4.5 4 3.7   Speed 5200 5200 5200   PI 5.7 8.3 8.9   Power (Edwards) 3.8 3.7 3.6   LSL 4800 4800 4800   Pulsatility Pulse Pulse Intermittent pulse     HCT:   Lab Results   Component Value Date    HCT 34.0 (L) 12/07/2023    HCT 30 (L) 03/22/2022       Problems / Issues / Alarms with VAD if any: None noted  VAD Sounds: HM3 Smooth      Equipment:  Emergency Equipment With Patient: Yes  Any Equipment Issues: None noted   It is medically necessary to ensure patient has properly functioning equipment and wearables to prevent infection, injury or death to patient.     DLES Assessment:  Appearance of DLES: 1 w/ small amt drainage    Antibiotics: NO  Velour: No  Manual & Visual Inspection Of Driveline: No kinks or tears noted  Stabilization Device In Use: yes, [place] securement device    Heartmate 3 Module Cable:  No yellow exposed and Attempted to unscrew modular cable to ensure it will be able to come lose in the event we ever need to change the modular cable while patient held the driveline in place so it would not move. Modular cable connection able to be unscrewed and re-tightened. Instructed pt to perform this weekly.        Assessment/ Quality of Life [place]:   Complaints Of Nausea / Vomiting: None noted    Appearance and Frequency Of Stools: normal and formed without blood & daily  Color Of Urine: clear/yellow  Pain: NO  Coping/Depression/Anxiety: coping okay  Sleep Habits: 6-7  hrs /night  Sleep Aids:  mirtazapine  Showering: Yes, reminded to change dressing immediately after drying off  Self- Care I have no problems with self- care  Mobility I have no problems walking about  Usual Activities I have no problems with performing my usual activities  Activity/Exercise: walking some   Driving: No.  Additional Comments: n/a    DLES Dressing Care:   Frequency of Dressing Changes: every other day & daily kit   Pt In Need Of Management Kits?:yes -   2 Box of daily kit  It is medically necessary to have VAD management kits in order to prevent infection or to assist in the healing of an infected DLES.    Labs:    Chemistry        Component Value Date/Time     10/04/2023 0828    K 3.9 10/04/2023 0828     10/04/2023 0828    CO2 24 10/04/2023 0828    BUN 19 10/04/2023 0828    CREATININE 1.0 10/04/2023 0828     10/04/2023 0828        Component Value Date/Time    CALCIUM 9.0 10/04/2023 0828    ALKPHOS 99 10/04/2023 0828    AST 10 10/04/2023 0828    ALT <5 (L) 10/04/2023 0828    BILITOT 0.5 10/04/2023 0828    ESTGFRAFRICA 55.3 (A) 07/13/2022 0834    EGFRNONAA 47.8 (A) 07/13/2022 0834            Magnesium   Date Value Ref Range Status   10/04/2023 2.3 1.6 - 2.6 mg/dL Final       Lab Results   Component Value Date    WBC 11.81 12/07/2023    HGB 10.6 (L) 12/07/2023    HCT 34.0 (L) 12/07/2023    MCV 90 12/07/2023     12/07/2023       Lab Results   Component Value Date    INR 1.6 (H) 12/07/2023    INR 1.8 11/27/2023    INR 1.5 11/20/2023       BNP   Date Value Ref Range Status   10/04/2023 132 (H) 0 - 99 pg/mL Final     Comment:     Values of less than 100 pg/ml are consistent with non-CHF populations.   07/06/2023 150 (H) 0 - 99 pg/mL Final     Comment:     Values of less than 100 pg/ml are consistent with non-CHF populations.   04/05/2023 108 (H) 0 - 99 pg/mL Final     Comment:     Values of less than 100 pg/ml are consistent with non-CHF populations.       LD   Date Value Ref Range  Status   12/07/2023 161 110 - 260 U/L Final     Comment:     Results are increased in hemolyzed samples.   10/04/2023 162 110 - 260 U/L Final     Comment:     Results are increased in hemolyzed samples.   07/06/2023 138 110 - 260 U/L Final     Comment:     Results are increased in hemolyzed samples.       Labs reviewed with patient: YES     Medication Reconciliation: per MA.  New Medication Detail Provided: yes  Coumadin Managed by: [facility]    Education: Reviewed driveline care, emergency procedures, how to change the controller, alarms with patient, as well as discussed how to page the VAD coordinator in case of an emergency.   Educated patient/family that chest compressions are allowed in the event they are needed.    Reminded patient/caregiver not to touch their face and to cover their mouth when they cough or sneeze.   Vaccines: Pt informed that we are encouraging all VAD patients to receive the Flu and COVID vaccines and boosters. Informed pt that they can take tylenol but should avoid other NSAIDs.       Plans/Needs: Routine f/u w/ Dr Lake. Patient reports feeling well. DLES cultured, small amount thick pale green drainage. ID notified. No further changes at this time.    RTC 3 months       Hurricane Season: No

## 2023-12-07 NOTE — PROGRESS NOTES
Pt presented for 36 month follow-up and verbalized consent and willingness to continue to participate with the INTERMACS registry.      Patient completed the EQ-5D quality of life questionnaire, KCCQ, and the Trail Making neurocognitive test in 101 seconds.

## 2023-12-07 NOTE — PROGRESS NOTES
Patient was seen today in clinic. MPU maintenance complete. Backup controller charged and self test passed.      Equipment:  Any Equipment Issues: None noted     Emergency Bag  Emergency Equipment With Patient: Yes  Condition of emergency bag: Good  Contents of emergency bag: Backup controller, 2 fully charged batteries, 2 battery clips, reference cards    Maintenance Tracking  Patient has monthly checklist today: No  Patient correctly utilizing monthly checklist: No  Educated patient on utilizing monthly checklist correctly and importance of this: Yes    Equipment Inspection  Inspected patient's equipment today: Yes  Equipment clean and free of debris, including locking mechanism: Yes  Cleaned equipment today and educated patient on how to do this: Yes    Manual and visual inspection of driveline: No  Kinks, rescue tape, tears: No  Rescue tape applied: No  Clamshell repair: No  Perc lead repair: No    Mobile Power Unit  Mobile power unit serial number:[id number]  MPU maintenance due: 6/2024  MPU maintenance completed today:  Yes  Mobile Power Unit 6 month maintenance and AA battery replacement complete. MPU, MPU patient cable and AC power cord inspected for damage and noted to be in good condition.    Backup Controller and Emergency Backup Battery  Backup controller serial number: [id number]  EBB S/N: [id number]  Manufacture date: 11/10/2022  EBB due to be charged: 6/2024  EBB charged today and self test completed: Yes  Self test passed:  Yes  EBB expires and due to be changed: 11/2025  EBB changed today: No    It is medically necessary to ensure patient has properly functioning equipment and wearables to prevent infection, injury or death to patient.   Waveforms sent: No

## 2023-12-10 LAB — BACTERIA SPEC AEROBE CULT: NO GROWTH

## 2023-12-11 DIAGNOSIS — I10 ESSENTIAL HYPERTENSION: ICD-10-CM

## 2023-12-11 DIAGNOSIS — I50.42 CHRONIC COMBINED SYSTOLIC AND DIASTOLIC HEART FAILURE: ICD-10-CM

## 2023-12-11 RX ORDER — AMLODIPINE BESYLATE 5 MG/1
10 TABLET ORAL DAILY
Qty: 60 TABLET | Refills: 11 | Status: SHIPPED | OUTPATIENT
Start: 2023-12-11 | End: 2024-12-10

## 2023-12-11 RX ORDER — FERROUS GLUCONATE 324(37.5)
324 TABLET ORAL
Qty: 30 TABLET | Refills: 11 | Status: SHIPPED | OUTPATIENT
Start: 2023-12-11 | End: 2024-12-10

## 2023-12-11 RX ORDER — FUROSEMIDE 40 MG/1
40 TABLET ORAL DAILY
Qty: 30 TABLET | Refills: 11 | Status: SHIPPED | OUTPATIENT
Start: 2023-12-11 | End: 2024-12-10

## 2023-12-12 ENCOUNTER — TELEPHONE (OUTPATIENT)
Dept: TRANSPLANT | Facility: CLINIC | Age: 68
End: 2023-12-12
Payer: MEDICARE

## 2023-12-12 NOTE — TELEPHONE ENCOUNTER
Attempted to contact patient regarding DLES and if he is still having drainage, no answer and voicemail left requesting return phone call.

## 2023-12-14 ENCOUNTER — ANTI-COAG VISIT (OUTPATIENT)
Dept: CARDIOLOGY | Facility: CLINIC | Age: 68
End: 2023-12-14
Payer: MEDICARE

## 2023-12-14 DIAGNOSIS — Z95.811 LVAD (LEFT VENTRICULAR ASSIST DEVICE) PRESENT: Primary | ICD-10-CM

## 2023-12-14 LAB
BACTERIA SPEC ANAEROBE CULT: NORMAL
INR PPP: 1.4

## 2023-12-14 PROCEDURE — 93793 PR ANTICOAGULANT MGMT FOR PT TAKING WARFARIN: ICD-10-PCS | Mod: S$GLB,,,

## 2023-12-14 PROCEDURE — 93793 ANTICOAG MGMT PT WARFARIN: CPT | Mod: S$GLB,,,

## 2023-12-14 NOTE — PROGRESS NOTES
Patient verified correct dose and correct tablet size, reports he had iceberg salad twice yesterday, no other changes reported

## 2023-12-21 ENCOUNTER — ANTI-COAG VISIT (OUTPATIENT)
Dept: CARDIOLOGY | Facility: CLINIC | Age: 68
End: 2023-12-21
Payer: MEDICARE

## 2023-12-21 DIAGNOSIS — Z95.811 LVAD (LEFT VENTRICULAR ASSIST DEVICE) PRESENT: Primary | ICD-10-CM

## 2023-12-21 LAB — INR PPP: 1.4

## 2023-12-21 PROCEDURE — 93793 ANTICOAG MGMT PT WARFARIN: CPT | Mod: S$GLB,,,

## 2023-12-21 PROCEDURE — 93793 PR ANTICOAGULANT MGMT FOR PT TAKING WARFARIN: ICD-10-PCS | Mod: S$GLB,,,

## 2023-12-28 ENCOUNTER — APPOINTMENT (OUTPATIENT)
Dept: URBAN - METROPOLITAN AREA SURGERY 21 | Age: 68
Setting detail: DERMATOLOGY
End: 2023-12-28

## 2023-12-28 ENCOUNTER — ANTI-COAG VISIT (OUTPATIENT)
Dept: CARDIOLOGY | Facility: CLINIC | Age: 68
End: 2023-12-28
Payer: MEDICARE

## 2023-12-28 DIAGNOSIS — D18.0 HEMANGIOMA: ICD-10-CM

## 2023-12-28 DIAGNOSIS — L81.4 OTHER MELANIN HYPERPIGMENTATION: ICD-10-CM

## 2023-12-28 DIAGNOSIS — L82.1 OTHER SEBORRHEIC KERATOSIS: ICD-10-CM

## 2023-12-28 DIAGNOSIS — L72.8 OTHER FOLLICULAR CYSTS OF THE SKIN AND SUBCUTANEOUS TISSUE: ICD-10-CM

## 2023-12-28 DIAGNOSIS — Z95.811 LVAD (LEFT VENTRICULAR ASSIST DEVICE) PRESENT: Primary | ICD-10-CM

## 2023-12-28 PROBLEM — D18.01 HEMANGIOMA OF SKIN AND SUBCUTANEOUS TISSUE: Status: ACTIVE | Noted: 2023-12-28

## 2023-12-28 LAB — INR PPP: 1.6

## 2023-12-28 PROCEDURE — 93793 PR ANTICOAGULANT MGMT FOR PT TAKING WARFARIN: ICD-10-PCS | Mod: S$GLB,,,

## 2023-12-28 PROCEDURE — 93793 ANTICOAG MGMT PT WARFARIN: CPT | Mod: S$GLB,,,

## 2023-12-28 PROCEDURE — 99212 OFFICE O/P EST SF 10 MIN: CPT

## 2023-12-28 PROCEDURE — OTHER COUNSELING: OTHER

## 2023-12-28 ASSESSMENT — LOCATION DETAILED DESCRIPTION DERM
LOCATION DETAILED: RIGHT SUPERIOR UPPER BACK
LOCATION DETAILED: LEFT ELBOW
LOCATION DETAILED: RIGHT ANTERIOR DISTAL UPPER ARM
LOCATION DETAILED: RIGHT VENTRAL PROXIMAL FOREARM
LOCATION DETAILED: LEFT SUPERIOR LATERAL UPPER BACK
LOCATION DETAILED: LEFT SUPERIOR UPPER BACK
LOCATION DETAILED: LEFT CLAVICULAR SKIN
LOCATION DETAILED: RIGHT LATERAL SUPERIOR CHEST
LOCATION DETAILED: RIGHT MEDIAL UPPER BACK
LOCATION DETAILED: INFERIOR THORACIC SPINE
LOCATION DETAILED: LEFT INFERIOR LATERAL UPPER BACK
LOCATION DETAILED: RIGHT ANTERIOR PROXIMAL UPPER ARM
LOCATION DETAILED: RIGHT FOREHEAD
LOCATION DETAILED: LEFT VENTRAL PROXIMAL FOREARM
LOCATION DETAILED: LEFT ANTERIOR DISTAL UPPER ARM
LOCATION DETAILED: LEFT MEDIAL INFERIOR CHEST
LOCATION DETAILED: STERNUM
LOCATION DETAILED: RIGHT ANTERIOR SHOULDER
LOCATION DETAILED: LEFT MEDIAL UPPER BACK
LOCATION DETAILED: RIGHT POSTERIOR SHOULDER
LOCATION DETAILED: SUPERIOR THORACIC SPINE
LOCATION DETAILED: LEFT MID-UPPER BACK
LOCATION DETAILED: LEFT INFERIOR MEDIAL MIDBACK
LOCATION DETAILED: RIGHT SUPERIOR LATERAL UPPER BACK
LOCATION DETAILED: LEFT LATERAL UPPER BACK
LOCATION DETAILED: RIGHT MEDIAL SUPERIOR CHEST
LOCATION DETAILED: RIGHT MEDIAL INFERIOR CHEST
LOCATION DETAILED: RIGHT CLAVICULAR SKIN
LOCATION DETAILED: EPIGASTRIC SKIN
LOCATION DETAILED: RIGHT SUPERIOR LATERAL BUCCAL CHEEK
LOCATION DETAILED: RIGHT RIB CAGE
LOCATION DETAILED: LEFT ANTERIOR PROXIMAL UPPER ARM
LOCATION DETAILED: LEFT LATERAL FOREHEAD

## 2023-12-28 ASSESSMENT — LOCATION ZONE DERM
LOCATION ZONE: ARM
LOCATION ZONE: FACE
LOCATION ZONE: TRUNK

## 2023-12-28 ASSESSMENT — LOCATION SIMPLE DESCRIPTION DERM
LOCATION SIMPLE: UPPER BACK
LOCATION SIMPLE: LEFT ELBOW
LOCATION SIMPLE: RIGHT CHEEK
LOCATION SIMPLE: LEFT UPPER BACK
LOCATION SIMPLE: LEFT CLAVICULAR SKIN
LOCATION SIMPLE: RIGHT FOREARM
LOCATION SIMPLE: CHEST
LOCATION SIMPLE: RIGHT SHOULDER
LOCATION SIMPLE: LEFT LOWER BACK
LOCATION SIMPLE: RIGHT CLAVICULAR SKIN
LOCATION SIMPLE: ABDOMEN
LOCATION SIMPLE: LEFT FOREARM
LOCATION SIMPLE: RIGHT UPPER ARM
LOCATION SIMPLE: RIGHT FOREHEAD
LOCATION SIMPLE: RIGHT UPPER BACK
LOCATION SIMPLE: LEFT UPPER ARM
LOCATION SIMPLE: LEFT FOREHEAD

## 2023-12-31 NOTE — PROGRESS NOTES
GENERAL SURGERY PROGRESS NOTE    Date:  12/31/23     Impression and plan:  The patient is a 75-year-old male with abdominal pain which is seems to be constant, it is difficult to characterize since patient is a poor historian.  He does not have any peritoneal signs or a surgical abdomen.  I will obtain abdominal x-ray to assess his bowel gas pattern to see if he has significant mount of retained stool or not.  I will continue with supportive care.  If the x-ray is unremarkable and the pain remains manageable with the present medications I would consider transitioning out of the hospital over the next day or so.  If the patient has significant worsening of his pain I would consider evaluation by GI.  We will continue to follow for the time being.    Subjective:  The patient was seen in room 4318 with his night and daytime nurse present.  Patient does not recall to remember when he last had abdominal pain.  The nurse states that she gave pain medicine about half an hour ago.  Patient then reports having some right-sided upper abdominal minor discomfort earlier.  He is a poor historian.  He does not remember what it felt like.  He needs a lot of cueing to remember.  He denies any nausea or vomiting.  He cannot recall when he last had a bowel movement.  The nurse states that he did have a bowel movement some point yesterday but does not know how significant the stool was.  No other complaints reported by patient or his nurses.    Medications:  Current Facility-Administered Medications   Medication    meropenem (MERREM) 2 g in sodium chloride 0.9 % 100 mL IVPB    polyethylene glycol (MIRALAX) packet 17 g    docusate sodium (COLACE) capsule 100 mg    bisacodyl (DULCOLAX) suppository 10 mg    pantoprazole (PROTONIX INJECT) injection 40 mg    sodium chloride 0.9% infusion    amLODIPine (NORVASC) tablet 10 mg    [Held by provider] atorvastatin (LIPITOR) tablet 80 mg    [Held by provider] buPROPion (WELLBUTRIN SR) SR  INR at goal. Medications and chart reviewed. No changes noted to necessitate adjustment of warfarin or follow-up plan. See calendar.        tablet 150 mg    [Held by provider] FLUoxetine (PROzac) capsule 40 mg    oxyCODONE (IMM REL) (ROXICODONE) tablet 5 mg    HYDROmorphone (DILAUDID) injection 0.2 mg    aluminum-magnesium hydroxide-simethicone (MAALOX) 200-200-20 MG/5ML suspension 30 mL    baclofen (LIORESAL) tablet 10 mg    lisinopril (ZESTRIL) tablet 20 mg    sodium chloride 0.9 % flush bag 25 mL    sodium chloride 0.9 % injection 2 mL    ondansetron (ZOFRAN ODT) disintegrating tablet 4 mg    Or    ondansetron (ZOFRAN) injection 4 mg    [Held by provider] acetaminophen (TYLENOL) tablet 650 mg    Or    [Held by provider] acetaminophen (TYLENOL) suppository 650 mg    Potassium Standard Replacement Protocol (Levels 3.5 and lower)       Physical exam:  Vital Last Value 24 Hour Range   Temperature 98.1 °F (36.7 °C) (12/31/23 0747) Temp  Min: 97.7 °F (36.5 °C)  Max: 99.1 °F (37.3 °C)   Pulse 74 (12/31/23 0747) Pulse  Min: 69  Max: 80   Respiratory 18 (12/31/23 0747) Resp  Min: 16  Max: 18   Non-Invasive  Blood Pressure (!) 145/77 (12/31/23 0747) BP  Min: 124/73  Max: 153/75   Pulse Oximetry 90 % (12/31/23 0747) SpO2  Min: 90 %  Max: 95 %     Vital Today Admitted   Weight 79.5 kg (175 lb 4.3 oz) (12/25/23 1728) Weight: 79.5 kg (175 lb 4.3 oz) (12/25/23 1003)   Height  Height: 5' 6\" (167.6 cm) (12/25/23 1728)   BMI  BMI (Calculated): 28.29 (12/25/23 1728)         Intake/Output Summary (Last 24 hours) at 12/31/2023 1022  Last data filed at 12/31/2023 0600  Gross per 24 hour   Intake --   Output 1450 ml   Net -1450 ml        Physical Exam   General: No acute distress  Heart: Regular rate  Lungs: Occasional wheezing  Abdomen: Soft, rotund, no significant gaseous distention, nontender, normal bowel sounds.  No rebound rigidity or guarding.  No mass effect or fullness  Extremities: No edema    Labs:  Recent Labs   Lab 12/31/23  0648 12/30/23  0330 12/29/23  1626 12/29/23  0315 12/28/23  0728   WBC 7.8 9.1 10.1 11.2* 12.4*   RBC 3.78* 3.90* 3.84* 3.70* 3.91*   HGB  11.1* 11.3* 11.2* 10.6* 11.4*   HCT 33.1* 34.3* 33.4* 32.3* 33.8*   * 109* 114* 115* 127*   MCV 87.6 87.9 87.0 87.3 86.4      Recent Labs   Lab 12/31/23  0648 12/30/23  0330 12/29/23  1626 12/29/23  0315 12/28/23  0728   SODIUM 137 139 139 138 137   POTASSIUM 4.0 4.4 4.1 4.2 4.4   CHLORIDE 109 110 111* 109 111*   CO2 24 24 26 25 22   BUN 33* 42* 47* 54* 48*   CREATININE 1.06 1.31* 1.19* 1.15 1.18*   GLUCOSE 87 80 88 133* 123*   CALCIUM 8.0* 8.3* 8.9 8.6 8.8      Recent Labs   Lab 12/31/23  0648 12/30/23  0330 12/29/23  1626 12/29/23  0315 12/28/23  0728   AST 65* 43* 49* 47* 34   * 119* 137* 141* 134*   ALKPT 170* 152* 144* 145* 142*   BILIRUBIN 0.4 0.4 0.4 0.2 0.4     Recent Labs   Lab 12/26/23  0610 12/25/23  1006   LIPA 162* 215*         Alexis Best D.O., F.A.C.O.S., F.A.C.S.  General Surgeon  [address] [facility], [facility] [place] [facility] [place] [address]  [phone] (Phone)  [phone] (Fax)  [employer]

## 2024-01-02 NOTE — PROCEDURES
12/7/2023    11:13 AM 10/4/2023     9:38 AM 7/6/2023     9:06 AM 5/23/2023     4:28 AM 5/22/2023    11:52 PM 5/22/2023     8:52 PM 5/22/2023     8:11 AM   TXP JACEY INTERROGATIONS   Type HeartMate3 HeartMate3 HeartMate3       Flow 4.5 4 3.7       Speed 5200 5200 5200       PI 5.7 8.3 8.9       Power (Edwards) 3.8 3.7 3.6       LSL 4800 4800 4800       Pulsatility Pulse Pulse Intermittent pulse Pulse Pulse Pulse Pulse   }

## 2024-01-02 NOTE — PROGRESS NOTES
Subjective:   Patient ID:  Rocco France is a 68 y.o. male who presents for LVAD followup visit.    Implant Date: 1/13/2021  Initials: [facility]     Heartmate 3 RPM 5200     INR goal: 2-2.5  Bridge with Heparin   Antiplatelets:  stopped d/t SDH        12/7/2023    11:13 AM   TXP JACEY INTERROGATIONS   Type HeartMate3   Flow 4.5   Speed 5200   PI 5.7   Power (Edwards) 3.8   LSL 4800   Pulsatility Pulse   Interrogation of Ventricular assist device was performed with physician analysis of device parameters and review of device function. I have personally reviewed the interrogation findings and agree with findings as stated.     HPI  67 yo BM with stage D CHF due to NICM underwent HM3 implantation 1/13/2021 with sternal closure 1/14/2021.  He was admitted for syncope 1/27/2022 at which time he was found to have a evolving right cerebral acute subdural hematoma and acute basal cistern subarachnoid hemorrhage. He requiring intubation with prolonged hospitalization and was incidentally found to be positive for COVID-19 and treated with remdesivir. He was taken off of aspirin and discharged home 2/17/2022. He was readmitted 3/2022 for 3 days due to dysequilibrium, diplopia and repeat CT head was stable. Neurology recommended myasthenia gravis workup which was negative.  He was last seen in clinic April 2023 at which time the body of the note reflects a different dose of amlodipine and lisinopril then the patient list included in the note.  Patient does not recall what his medications were at that time but when he was readmitted to the hospital May 2023 following a fall the available information indicates that he was on lisinopril 5 mg and amlodipine 5 mg both daily.  He and his wife report the home health nurse helps with his blood pressure.      Patient last visit had BP meds adjusted. Today, seems better, blood pressure within range. Not as irritable as last visit but wife did not come in with him into the visit. Patient denies  "N/V/F/C, lightheadedness, dizziness, PND, orthopnea, LE edema, abdominal pain, abdominal pressure, chest pain, chest pressure, syncope, pre-syncope. Additionally patient has no bleeding, no bright red blood per rectum, melena, no GI symptoms at all. Has some green drainage from DLES, cultured, and ID notified. No change for now. NYHA FC III.     Review of Systems   Constitutional: Positive for malaise/fatigue. Negative for chills, fever, weight gain and weight loss.   Cardiovascular:  Positive for dyspnea on exertion. Negative for chest pain, claudication, irregular heartbeat, leg swelling, near-syncope, orthopnea, palpitations, paroxysmal nocturnal dyspnea and syncope.   Respiratory:  Negative for cough, shortness of breath, sputum production and wheezing.    Hematologic/Lymphatic: Negative for bleeding problem. Does not bruise/bleed easily.   Musculoskeletal:  Negative for falls (not since May 2023).   Gastrointestinal:  Negative for change in bowel habit, hematemesis, hematochezia and melena.   Genitourinary:  Negative for hematuria.   Neurological:  Positive for weakness. Negative for brief paralysis, dizziness (not lately), focal weakness, headaches and light-headedness (not lately).       Objective:   Blood pressure (!) 88/0, temperature 97.9 °F (36.6 °C), temperature source Oral, height 6' 2" (1.88 m), weight 75.8 kg (167 lb).body mass index is 21.44 kg/m².     Physical Exam  Constitutional:       General: He is not in acute distress.     Appearance: He is well-developed. He is not ill-appearing, toxic-appearing or diaphoretic.   HENT:      Head: Normocephalic and atraumatic.   Eyes:      General: No scleral icterus.        Right eye: No discharge.         Left eye: No discharge.      Conjunctiva/sclera: Conjunctivae normal.   Neck:      Thyroid: No thyromegaly.      Vascular: No JVD.      Trachea: No tracheal deviation.   Cardiovascular:      Comments: Normal LVAD sounds; DL 2, not incorporated, drainage " present, culture taken  Pulmonary:      Effort: Pulmonary effort is normal. No respiratory distress.      Breath sounds: Normal breath sounds. No wheezing or rales.   Abdominal:      General: Bowel sounds are normal. There is no distension.      Palpations: Abdomen is soft. There is no mass.      Tenderness: There is no abdominal tenderness. There is no guarding or rebound.   Musculoskeletal:         General: No swelling or tenderness.      Right lower leg: No edema.      Left lower leg: No edema.   Skin:     General: Skin is warm and dry.   Neurological:      General: No focal deficit present.      Mental Status: He is alert. Mental status is at baseline.   Psychiatric:         Mood and Affect: Mood normal.         Behavior: Behavior normal.         Thought Content: Thought content normal.         Judgment: Judgment normal.         Lab Results   Component Value Date     (H) 12/07/2023     12/07/2023    K 3.6 12/07/2023    MG 1.9 12/07/2023     12/07/2023    CO2 27 12/07/2023    PHOS 2.4 (L) 12/07/2023    BUN 11 12/07/2023    CREATININE 0.8 12/07/2023     (H) 12/07/2023    HGBA1C 6.6 (H) 05/19/2023    AST 12 12/07/2023    ALT 8 (L) 12/07/2023    ALBUMIN 3.2 (L) 12/07/2023    PROT 9.5 (H) 12/07/2023    BILITOT 1.3 (H) 12/07/2023    WBC 11.81 12/07/2023    HGB 10.6 (L) 12/07/2023    HCT 34.0 (L) 12/07/2023    HCT 30 (L) 03/22/2022     12/07/2023    INR 1.6 12/28/2023     12/07/2023    TSH 0.906 05/18/2023    CHOL 126 12/07/2023    HDL 40 12/07/2023    LDLCALC 72.8 12/07/2023    TRIG 66 12/07/2023     Assessment:      1. LVAD (left ventricular assist device) present    2. Heart replaced by heart assist device    3. Chronic combined systolic and diastolic heart failure    4. Essential hypertension        Plan:   Patient in a better mood overall today, driveline while not incorporated is not draining as much as last visit.   No changes to meds otherwise indicated.   Patient is now  NYHA II    Recommend 2 gram sodium restriction and 1500cc fluid restriction.    Encourage physical activity with graded exercise program.  Requested patient to weigh themselves daily, and to notify us if their weight increases by more than 3 lbs in 1 day or 5 lbs in 1 week.     Listed for transplant: No

## 2024-01-03 DIAGNOSIS — Z95.811 LVAD (LEFT VENTRICULAR ASSIST DEVICE) PRESENT: ICD-10-CM

## 2024-01-04 ENCOUNTER — ANTI-COAG VISIT (OUTPATIENT)
Dept: CARDIOLOGY | Facility: CLINIC | Age: 69
End: 2024-01-04

## 2024-01-04 DIAGNOSIS — Z95.811 LVAD (LEFT VENTRICULAR ASSIST DEVICE) PRESENT: Primary | ICD-10-CM

## 2024-01-04 LAB — INR PPP: 1.8

## 2024-01-04 PROCEDURE — 93793 ANTICOAG MGMT PT WARFARIN: CPT | Mod: ,,,

## 2024-01-04 RX ORDER — WARFARIN SODIUM 5 MG/1
2.5-5 TABLET ORAL DAILY
Qty: 35 TABLET | Refills: 10 | Status: SHIPPED | OUTPATIENT
Start: 2024-01-04 | End: 2025-01-23

## 2024-01-04 RX ORDER — WARFARIN SODIUM 5 MG/1
TABLET ORAL
Qty: 30 TABLET | Refills: 0 | OUTPATIENT
Start: 2024-01-04

## 2024-01-04 RX ORDER — WARFARIN SODIUM 5 MG/1
2.5-5 TABLET ORAL DAILY
Qty: 35 TABLET | Refills: 10 | Status: SHIPPED | OUTPATIENT
Start: 2024-01-04 | End: 2024-01-04 | Stop reason: SDUPTHER

## 2024-01-11 ENCOUNTER — ANTI-COAG VISIT (OUTPATIENT)
Dept: CARDIOLOGY | Facility: CLINIC | Age: 69
End: 2024-01-11

## 2024-01-11 DIAGNOSIS — Z95.811 LVAD (LEFT VENTRICULAR ASSIST DEVICE) PRESENT: Primary | ICD-10-CM

## 2024-01-11 LAB — INR PPP: 1.8

## 2024-01-11 PROCEDURE — 93793 ANTICOAG MGMT PT WARFARIN: CPT | Mod: ,,,

## 2024-01-11 NOTE — PROGRESS NOTES
[facility]    2024 9:36 AM    Assessment/Plan:    Patient presents today with therapeutic INR.    Assessment of patient findings and chart review: no significant findings     Recommendation for patient's warfarin regimen: Continue current maintenance dose    Recommend repeat INR in 1 week  _________________________________________________________________    Rocco NATALIE France (68 y.o.) is followed by the [facility].    Anticoagulation Summary  As of 2024      INR goal:  1.5-2.0   TTR:  54.9 % (2.8 y)   INR used for dosin.8 (2024)   Warfarin maintenance plan:  2.5 mg (5 mg x 0.5) every Mon; 5 mg (5 mg x 1) all other days   Weekly warfarin total:  32.5 mg   Plan last modified:  Cassidy Snider, PharmD (2023)   Next INR check:  2024   Target end date:      Indications    LVAD (left ventricular assist device) present [Z95.811]                 Anticoagulation Episode Summary       INR check location:      Preferred lab:      Send INR reminders to:  [facility] COUMADIN LVAD    Comments:  LVAD // Home Health  [phone]  //[facility] ph [phone]/ results Lab - [phone]          Anticoagulation Care Providers       Provider Role Specialty Phone number    Pete Garnett MD [facility] Cardiology [phone]

## 2024-01-18 ENCOUNTER — ANTI-COAG VISIT (OUTPATIENT)
Dept: CARDIOLOGY | Facility: CLINIC | Age: 69
End: 2024-01-18

## 2024-01-18 DIAGNOSIS — Z95.811 LVAD (LEFT VENTRICULAR ASSIST DEVICE) PRESENT: Primary | ICD-10-CM

## 2024-01-18 LAB — INR PPP: 1.4

## 2024-01-18 PROCEDURE — 93793 ANTICOAG MGMT PT WARFARIN: CPT | Mod: ,,,

## 2024-01-18 NOTE — PROGRESS NOTES
Patient reports he may have missed 1/12/24 or 1/13/24 Warfarin dose but not certain, took correct Warfarin dose all other days, constipation, coleslaw 1/15/24, cranberry juice 1/15/24, no other changes reported.

## 2024-01-18 NOTE — PROGRESS NOTES
[facility]    2024 10:55 AM    Assessment/Plan:    Patient presents today with subtherapeutic  INR.    Assessment of patient findings and chart review: May have missed a dose but is uncertain; also recent coleslaw intake    Recommendation for patient's warfarin regimen: Boost dose today to 7.5mg then resume current maintenance dose    Recommend repeat INR in 1 week  _________________________________________________________________    Rocco France (68 y.o.) is followed by the [facility].    Anticoagulation Summary  As of 2024      INR goal:  1.5-2.0   TTR:  55.1 % (2.8 y)   INR used for dosin.4 (2024)   Warfarin maintenance plan:  2.5 mg (5 mg x 0.5) every Mon; 5 mg (5 mg x 1) all other days   Weekly warfarin total:  32.5 mg   Plan last modified:  Cassidy Sndier, PharmD (2023)   Next INR check:  2024   Target end date:      Indications    LVAD (left ventricular assist device) present [Z95.811]                 Anticoagulation Episode Summary       INR check location:      Preferred lab:      Send INR reminders to:  [facility] COUMADIN LVAD    Comments:  LVAD // Home Health [phone]  //[facility] ph [phone]/ results Lab - [phone]          Anticoagulation Care Providers       Provider Role Specialty Phone number    Pete Garnett MD [facility] Cardiology [phone]

## 2024-01-24 ENCOUNTER — TELEPHONE (OUTPATIENT)
Dept: TRANSPLANT | Facility: CLINIC | Age: 69
End: 2024-01-24

## 2024-01-24 NOTE — TELEPHONE ENCOUNTER
"Received notification that patient "fell". Clarified with patient he was sitting and slid off a step, no injuries reported. Was also notified that patient is having tooth extractions of entire mouth. Asked patient to have dental office clarify what they are doing and connect with us prior to doing any kind of procedure. Patient verbalized understanding.   "

## 2024-01-25 ENCOUNTER — ANTI-COAG VISIT (OUTPATIENT)
Dept: CARDIOLOGY | Facility: CLINIC | Age: 69
End: 2024-01-25

## 2024-01-25 DIAGNOSIS — Z95.811 LVAD (LEFT VENTRICULAR ASSIST DEVICE) PRESENT: Primary | ICD-10-CM

## 2024-01-25 LAB — INR PPP: 1.4

## 2024-01-25 PROCEDURE — 93793 ANTICOAG MGMT PT WARFARIN: CPT | Mod: ,,,

## 2024-01-25 NOTE — PROGRESS NOTES
[facility]    2024 9:06 AM    Assessment/Plan:    Patient presents today with therapeutic INR.    Assessment of patient findings and chart review: no significant findings     Recommendation for patient's warfarin regimen: Boost dose today to 7.5mg then increase maintenance dose    Recommend repeat INR Monday  _________________________________________________________________    Rocco ESTRADA Román (68 y.o.) is followed by the [facility].    Anticoagulation Summary  As of 2024      INR goal:  1.5-2.0   TTR:  54.7 % (2.8 y)   INR used for dosin.4 (2024)   Warfarin maintenance plan:  5 mg (5 mg x 1) every day   Weekly warfarin total:  35 mg   Plan last modified:  Cheryl Barahona, PharmD (2024)   Next INR check:  2024   Target end date:      Indications    LVAD (left ventricular assist device) present [Z95.811]                 Anticoagulation Episode Summary       INR check location:      Preferred lab:      Send INR reminders to:  [facility] COUMADIN LVAD    Comments:  LVAD // Home Health  [phone]  //[facility] ph [phone]/ results Lab - [phone]          Anticoagulation Care Providers       Provider Role Specialty Phone number    Pete Garnett MD [facility] Cardiology [phone]

## 2024-01-29 ENCOUNTER — ANTI-COAG VISIT (OUTPATIENT)
Dept: CARDIOLOGY | Facility: CLINIC | Age: 69
End: 2024-01-29

## 2024-01-29 DIAGNOSIS — Z95.811 LVAD (LEFT VENTRICULAR ASSIST DEVICE) PRESENT: Primary | ICD-10-CM

## 2024-01-29 LAB — INR PPP: 1.6

## 2024-01-29 PROCEDURE — 93793 ANTICOAG MGMT PT WARFARIN: CPT | Mod: ,,,

## 2024-01-29 NOTE — PROGRESS NOTES
[facility]    2024 8:56 AM    Assessment/Plan:    Patient presents today with therapeutic INR.    Assessment of patient findings and chart review: no significant findings     Recommendation for patient's warfarin regimen: Continue current maintenance dose    Recommend repeat INR Thursday  _________________________________________________________________    Rocco ESTRADA Román (68 y.o.) is followed by the [facility].    Anticoagulation Summary  As of 2024      INR goal:  1.5-2.0   TTR:  54.7 % (2.8 y)   INR used for dosin.6 (2024)   Warfarin maintenance plan:  5 mg (5 mg x 1) every day   Weekly warfarin total:  35 mg   Plan last modified:  Cheryl Barahona, PharmD (2024)   Next INR check:  2024   Target end date:      Indications    LVAD (left ventricular assist device) present [Z95.811]                 Anticoagulation Episode Summary       INR check location:      Preferred lab:      Send INR reminders to:  [facility] COUMADIN LVAD    Comments:  LVAD // Home Health  [phone]  //[facility] ph [phone]/ results Lab - [phone]          Anticoagulation Care Providers       Provider Role Specialty Phone number    Pete Garnett MD [facility] Cardiology [phone]

## 2024-02-01 ENCOUNTER — ANTI-COAG VISIT (OUTPATIENT)
Dept: CARDIOLOGY | Facility: CLINIC | Age: 69
End: 2024-02-01
Payer: MEDICARE

## 2024-02-01 DIAGNOSIS — Z95.811 LVAD (LEFT VENTRICULAR ASSIST DEVICE) PRESENT: Primary | ICD-10-CM

## 2024-02-01 LAB — INR PPP: 1.6

## 2024-02-01 PROCEDURE — 93793 ANTICOAG MGMT PT WARFARIN: CPT | Mod: ,,,

## 2024-02-01 NOTE — PROGRESS NOTES
[facility]    2024 9:43 AM    Assessment/Plan:    Patient presents today with therapeutic INR.    Assessment of patient findings and chart review: no significant findings     Recommendation for patient's warfarin regimen: Continue current maintenance dose    Recommend repeat INR in 1 week  _________________________________________________________________    Rocco ESTRADA Román (68 y.o.) is followed by the [facility].    Anticoagulation Summary  As of 2024      INR goal:  1.5-2.0   TTR:  54.8 % (2.8 y)   INR used for dosin.6 (2024)   Warfarin maintenance plan:  5 mg (5 mg x 1) every day   Weekly warfarin total:  35 mg   Plan last modified:  Cheryl Barahona, PharmD (2024)   Next INR check:  2024   Target end date:      Indications    LVAD (left ventricular assist device) present [Z95.811]                 Anticoagulation Episode Summary       INR check location:      Preferred lab:      Send INR reminders to:  [facility] COUMADIN LVAD    Comments:  LVAD // Home Health  [phone]  //[facility] ph [phone]/ results Lab - [phone]          Anticoagulation Care Providers       Provider Role Specialty Phone number    Pete Garnett MD [facility] Cardiology [phone]

## 2024-02-06 ENCOUNTER — TELEPHONE (OUTPATIENT)
Dept: TRANSPLANT | Facility: CLINIC | Age: 69
End: 2024-02-06
Payer: MEDICARE

## 2024-02-06 NOTE — TELEPHONE ENCOUNTER
Per Tran SERVIN, [facility]  requesting last clinic encounter note for readmittance of patient.     Patient most recent clinic encounter dated 12/07/23. Note was faxed to   [facility] [phone].

## 2024-02-08 ENCOUNTER — ANTI-COAG VISIT (OUTPATIENT)
Dept: CARDIOLOGY | Facility: CLINIC | Age: 69
End: 2024-02-08
Payer: MEDICARE

## 2024-02-08 DIAGNOSIS — Z95.811 LVAD (LEFT VENTRICULAR ASSIST DEVICE) PRESENT: Primary | ICD-10-CM

## 2024-02-08 LAB — INR PPP: 2.4

## 2024-02-08 PROCEDURE — 93793 ANTICOAG MGMT PT WARFARIN: CPT | Mod: ,,,

## 2024-02-08 NOTE — PROGRESS NOTES
[facility]    2024 11:25 AM    Assessment/Plan:    Patient presents today with supratherapeutic INR.    Assessment of patient findings and chart review: Thinks he may have taken an extra dose one day this week    Recommendation for patient's warfarin regimen: Lower dose today to 2.5mg then resume current maintenance dose    Recommend repeat INR Monday   _________________________________________________________________    Rocco ESTRADA Román (68 y.o.) is followed by the [facility].    Anticoagulation Summary  As of 2024      INR goal:  1.5-2.0   TTR:  54.8 % (2.8 y)   INR used for dosin.4 (2024)   Warfarin maintenance plan:  5 mg (5 mg x 1) every day   Weekly warfarin total:  35 mg   Plan last modified:  Cheryl Barahona, PharmD (2024)   Next INR check:  2024   Target end date:      Indications    LVAD (left ventricular assist device) present [Z95.811]                 Anticoagulation Episode Summary       INR check location:      Preferred lab:      Send INR reminders to:  [facility] COUMADIN LVAD    Comments:  LVAD // Home Health  [phone]  //[facility] ph [phone]/ results Lab - [phone]          Anticoagulation Care Providers       Provider Role Specialty Phone number    Pete Garnett MD [facility] Cardiology [phone]

## 2024-02-08 NOTE — PROGRESS NOTES
Patient reports correct Warfarin dose, pt thinks he may have taken extra Warfarin one day this week but not certain of the day, cranberry juice 2/1/24, no other changes reported.

## 2024-02-09 NOTE — PROGRESS NOTES
2/09/24 Patient called to report he skipped dose 2/08 due to not getting message with instructions, inquiring what to take now

## 2024-02-12 ENCOUNTER — ANTI-COAG VISIT (OUTPATIENT)
Dept: CARDIOLOGY | Facility: CLINIC | Age: 69
End: 2024-02-12
Payer: MEDICARE

## 2024-02-12 DIAGNOSIS — Z95.811 LVAD (LEFT VENTRICULAR ASSIST DEVICE) PRESENT: Primary | ICD-10-CM

## 2024-02-12 LAB — INR PPP: 1.6

## 2024-02-12 PROCEDURE — 93793 ANTICOAG MGMT PT WARFARIN: CPT | Mod: ,,,

## 2024-02-12 NOTE — PROGRESS NOTES
[facility]    2024 9:22 AM    Assessment/Plan:    Patient presents today with therapeutic INR.    Assessment of patient findings and chart review: no significant findings     Recommendation for patient's warfarin regimen: Continue current maintenance dose    Recommend repeat INR in 1 week  _________________________________________________________________    Rocco ESTRADA Román (68 y.o.) is followed by the [facility].    Anticoagulation Summary  As of 2024      INR goal:  1.5-2.0   TTR:  54.8 % (2.9 y)   INR used for dosin.6 (2024)   Warfarin maintenance plan:  5 mg (5 mg x 1) every day   Weekly warfarin total:  35 mg   Plan last modified:  Cheryl Barahona, PharmD (2024)   Next INR check:  2024   Target end date:      Indications    LVAD (left ventricular assist device) present [Z95.811]                 Anticoagulation Episode Summary       INR check location:      Preferred lab:      Send INR reminders to:  [facility] COUMADIN LVAD    Comments:  LVAD // Home Health  [phone]  //[facility] ph [phone]/ results Lab - [phone]          Anticoagulation Care Providers       Provider Role Specialty Phone number    Pete Garnett MD [facility] Cardiology [phone]

## 2024-02-19 ENCOUNTER — ANTI-COAG VISIT (OUTPATIENT)
Dept: CARDIOLOGY | Facility: CLINIC | Age: 69
End: 2024-02-19
Payer: MEDICARE

## 2024-02-19 DIAGNOSIS — Z95.811 LVAD (LEFT VENTRICULAR ASSIST DEVICE) PRESENT: Primary | ICD-10-CM

## 2024-02-19 LAB — INR PPP: 2.9

## 2024-02-19 PROCEDURE — 93793 ANTICOAG MGMT PT WARFARIN: CPT | Mod: ,,,

## 2024-02-19 NOTE — PROGRESS NOTES
[facility]    2024 3:07 PM    Assessment/Plan:    Patient presents today with supratherapeutic INR.    Assessment of patient findings and chart review: no significant findings     Recommendation for patient's warfarin regimen: Hold dose today then decrease maintenance dose    Recommend repeat INR Thursday  _________________________________________________________________    Rocco ESTRADA Román (68 y.o.) is followed by the [facility].    Anticoagulation Summary  As of 2024      INR goal:  1.5-2.0   TTR:  54.6 % (2.9 y)   INR used for dosin.9 (2024)   Warfarin maintenance plan:  2.5 mg (5 mg x 0.5) every Wed; 5 mg (5 mg x 1) all other days   Weekly warfarin total:  32.5 mg   Plan last modified:  Cheryl Barahona, PharmD (2024)   Next INR check:  2024   Target end date:      Indications    LVAD (left ventricular assist device) present [Z95.811]                 Anticoagulation Episode Summary       INR check location:      Preferred lab:      Send INR reminders to:  [facility] COUMADIN LVAD    Comments:  LVAD // Home Health  [phone]  //[facility] ph [phone]/ results Lab - [phone]          Anticoagulation Care Providers       Provider Role Specialty Phone number    Ptee Garnett MD [facility] Cardiology [phone]

## 2024-02-19 NOTE — PROGRESS NOTES
Patient reports correct Warfarin dose, constipation for 3 days, cranberry juice 2/15/24, no other changes reported.

## 2024-02-22 ENCOUNTER — ANTI-COAG VISIT (OUTPATIENT)
Dept: CARDIOLOGY | Facility: CLINIC | Age: 69
End: 2024-02-22
Payer: MEDICARE

## 2024-02-22 DIAGNOSIS — Z95.811 LVAD (LEFT VENTRICULAR ASSIST DEVICE) PRESENT: Primary | ICD-10-CM

## 2024-02-22 LAB — INR PPP: 1.3

## 2024-02-22 PROCEDURE — 93793 ANTICOAG MGMT PT WARFARIN: CPT | Mod: ,,,

## 2024-02-22 NOTE — PROGRESS NOTES
[facility]    2024 2:43 PM    Assessment/Plan:    Patient presents today with subtherapeutic  INR.    Assessment of patient findings and chart review: INR now slightly below goal after holding    Recommendation for patient's warfarin regimen: Continue current maintenance dose    Recommend repeat INR Monday  _________________________________________________________________    Rocco ESTRADA Román (68 y.o.) is followed by the [facility].    Anticoagulation Summary  As of 2024      INR goal:  1.5-2.0   TTR:  54.5 % (2.9 y)   INR used for dosin.3 (2024)   Warfarin maintenance plan:  2.5 mg (5 mg x 0.5) every Wed; 5 mg (5 mg x 1) all other days   Weekly warfarin total:  32.5 mg   Plan last modified:  Cheryl Barahona, PharmD (2024)   Next INR check:  2024   Target end date:      Indications    LVAD (left ventricular assist device) present [Z95.811]                 Anticoagulation Episode Summary       INR check location:      Preferred lab:      Send INR reminders to:  [facility] COUMADIN LVAD    Comments:  LVAD // Home Health  [phone]  //[facility] ph [phone]/ results Lab - [phone]          Anticoagulation Care Providers       Provider Role Specialty Phone number    Pete Garnett MD [facility] Cardiology [phone]

## 2024-02-27 NOTE — PROGRESS NOTES
02/27/2024-Spoke to Meme regarding 2/26/24 missed INR. Meme stated she will have pt go for INR today.

## 2024-02-29 ENCOUNTER — ANTI-COAG VISIT (OUTPATIENT)
Dept: CARDIOLOGY | Facility: CLINIC | Age: 69
End: 2024-02-29
Payer: MEDICARE

## 2024-02-29 DIAGNOSIS — Z95.811 LVAD (LEFT VENTRICULAR ASSIST DEVICE) PRESENT: Primary | ICD-10-CM

## 2024-02-29 LAB — INR PPP: 1.6

## 2024-02-29 PROCEDURE — 93793 ANTICOAG MGMT PT WARFARIN: CPT | Mod: ,,,

## 2024-02-29 NOTE — PROGRESS NOTES
[facility]    2024 8:22 AM    Assessment/Plan:    Patient presents today with therapeutic INR.    Assessment of patient findings and chart review: no significant findings     Recommendation for patient's warfarin regimen: Continue current maintenance dose    Recommend repeat INR in 1 week  _________________________________________________________________    Rocco NATALIE France (68 y.o.) is followed by the [facility].    Anticoagulation Summary  As of 2024      INR goal:  1.5-2.0   TTR:  54.4 % (2.9 y)   INR used for dosin.6 (2024)   Warfarin maintenance plan:  2.5 mg (5 mg x 0.5) every Wed; 5 mg (5 mg x 1) all other days   Weekly warfarin total:  32.5 mg   Plan last modified:  Cheryl Barahona, PharmD (2024)   Next INR check:  3/7/2024   Target end date:      Indications    LVAD (left ventricular assist device) present [Z95.811]                 Anticoagulation Episode Summary       INR check location:      Preferred lab:      Send INR reminders to:  [facility] COUMADIN LVAD    Comments:  LVAD // Home Health  [phone]  //[facility] ph [phone]/ results Lab - [phone]          Anticoagulation Care Providers       Provider Role Specialty Phone number    Pete Garnett MD [facility] Cardiology [phone]

## 2024-03-04 ENCOUNTER — TELEPHONE (OUTPATIENT)
Dept: TRANSPLANT | Facility: CLINIC | Age: 69
End: 2024-03-04
Payer: MEDICARE

## 2024-03-04 DIAGNOSIS — Z95.811 LVAD (LEFT VENTRICULAR ASSIST DEVICE) PRESENT: Primary | ICD-10-CM

## 2024-03-04 NOTE — TELEPHONE ENCOUNTER
3/2/24:  Notified by Dr. Chauhan that patient is at an outside ER asking to be admitted to inpatient rehab for drug rehab.  Pt said his home health nurse sent him there.  Dr. Chauhan asked if we, the VC, were aware of any drug issues with this patient and I said absolutely not.  Seeing pt had a stroke in past, Dr. Chauhan and I both asked if he was having another stroke.  Dr. Chauhan called me back and told me drug screen was positive.  Pt was D/C home.  Asked me to follow up with [employer] regarding this ER admission.   3/4/24 1130:  Called [employer] ([phone]) and was told this patient never had them as a hh company.    I called patient's home number, that is his ex wife Jaun number.  I called patient's cell number, no answer, voicemail not set up.  Pt is scheduled for clinic Thursday.  Will see if  is available to assist with drug rehab referrals.

## 2024-03-04 NOTE — TELEPHONE ENCOUNTER
F/u with HH regarding instructions to send patient to ER this weekend for substance abuse. Provided education to HH that inpatient drug rehab facilities do not accept VAD patients. Requested that in the future if the patient is being sent to the ER for any reason to contact the  on call at [phone]. [facility] states they are sending a nurse to check on this patient today.

## 2024-03-05 ENCOUNTER — TELEPHONE (OUTPATIENT)
Dept: TRANSPLANT | Facility: CLINIC | Age: 69
End: 2024-03-05
Payer: MEDICARE

## 2024-03-05 NOTE — TELEPHONE ENCOUNTER
[facility]  received message to contact pt regarding possible need for substance use support.    Worker attempted to reach the pt by phone with no answer and no ability to leave a voice message.     also attempted to reach the pt's spouse/HCPA, however there was no answer.    Transplant Social Workers will continue to follow.

## 2024-03-06 ENCOUNTER — TELEPHONE (OUTPATIENT)
Dept: TRANSPLANT | Facility: CLINIC | Age: 69
End: 2024-03-06
Payer: MEDICARE

## 2024-03-06 NOTE — TELEPHONE ENCOUNTER
[facility]  asked to contact pt by phone to discuss pt request for drug rehab programs.     spoke with the pt by phone.    The pt is 68 yrs old,  from his spouse and lives alone in his own home:   [address], [place]   1 hr and 45 minutes  from [facility]  25 minutes from [place]    The pt's cell phone is: [phone]    Pt reports currently the most supportive family members are his daughter, Laura Barron and step daughter Mary.   Laura lives in [place]: [phone], Mary also lives out of state.   Pt does not know Mary's phone number at this moment.   Additional Emergency contact/HCPA  is his spouse: Meme France  [phone].    Pt reports no drug use prior to January and February of 2024.  Pt reports difficulty with life stressors, but is unsure what tipped him over to start using cocaine and participating in gambling in January and February 2024. Pt reports no smoking or alcohol use.     Pt reports daily use of cocaine until his daughter and step daughter stepped in and asked him to stop.  Pt reports he has decided to stop the drug use as it was not providing relief and cause additional distress.    Pt states he is not suicidal at this time.   Pt reports he has decided to look forward and try to maintain a positive attitude. Pt reports he has decided to join a gym to become more active. Pt was once active in [employer], worker encouraged pt to reach out to his . Pt reports he will think about same.     Pt reports he does not feel in pt rehab is what he needs at this time, but does agree that out pt counseling is needed.    Pt has reported in the past he has taken medication for depression, however pt reports he can't remember  taking this kind of  medication.  Pt reports he does take medication to help him sleep.     Pt reports he will think about contacting [facility] for an appointment.  Worker will also look for resources closer to  the pt's home.  Pt will see MD at [facility] tomorrow and pt has agreed to an appointment with the  as well.   Worker spoke to LVAD Coordinator Zaida Renner as asked for a 10:00am appointment.  In addition it could be beneficial for the pt to get a neurocognitive evaluation due to pt's change in behavior, difficulty with his relationships and memory.  Pt does have a history of a stroke.    Pt reports he does have available transportation.    will follow up with pt in clinic on 3/7/24.

## 2024-03-07 ENCOUNTER — CLINICAL SUPPORT (OUTPATIENT)
Dept: TRANSPLANT | Facility: CLINIC | Age: 69
End: 2024-03-07
Payer: MEDICARE

## 2024-03-07 ENCOUNTER — SOCIAL WORK (OUTPATIENT)
Dept: TRANSPLANT | Facility: CLINIC | Age: 69
End: 2024-03-07
Payer: MEDICARE

## 2024-03-07 ENCOUNTER — LAB VISIT (OUTPATIENT)
Dept: LAB | Facility: HOSPITAL | Age: 69
End: 2024-03-07
Payer: MEDICARE

## 2024-03-07 ENCOUNTER — PATIENT MESSAGE (OUTPATIENT)
Dept: PALLIATIVE MEDICINE | Facility: CLINIC | Age: 69
End: 2024-03-07
Payer: MEDICARE

## 2024-03-07 ENCOUNTER — ANTI-COAG VISIT (OUTPATIENT)
Dept: CARDIOLOGY | Facility: CLINIC | Age: 69
End: 2024-03-07
Payer: MEDICARE

## 2024-03-07 ENCOUNTER — OFFICE VISIT (OUTPATIENT)
Dept: TRANSPLANT | Facility: CLINIC | Age: 69
End: 2024-03-07
Payer: MEDICARE

## 2024-03-07 ENCOUNTER — TELEPHONE (OUTPATIENT)
Dept: PALLIATIVE MEDICINE | Facility: CLINIC | Age: 69
End: 2024-03-07
Payer: MEDICARE

## 2024-03-07 VITALS
TEMPERATURE: 98 F | HEART RATE: 82 BPM | SYSTOLIC BLOOD PRESSURE: 92 MMHG | WEIGHT: 175 LBS | HEIGHT: 72 IN | BODY MASS INDEX: 23.7 KG/M2

## 2024-03-07 DIAGNOSIS — I50.42 CHRONIC COMBINED SYSTOLIC AND DIASTOLIC HEART FAILURE: ICD-10-CM

## 2024-03-07 DIAGNOSIS — Z95.810 ICD (IMPLANTABLE CARDIOVERTER-DEFIBRILLATOR) IN PLACE: ICD-10-CM

## 2024-03-07 DIAGNOSIS — Z95.811 LVAD (LEFT VENTRICULAR ASSIST DEVICE) PRESENT: Primary | ICD-10-CM

## 2024-03-07 DIAGNOSIS — I10 ESSENTIAL HYPERTENSION: ICD-10-CM

## 2024-03-07 DIAGNOSIS — E78.2 MIXED HYPERLIPIDEMIA: ICD-10-CM

## 2024-03-07 DIAGNOSIS — E11.9 TYPE 2 DIABETES MELLITUS WITHOUT COMPLICATION, WITHOUT LONG-TERM CURRENT USE OF INSULIN: ICD-10-CM

## 2024-03-07 DIAGNOSIS — G47.00 INSOMNIA, UNSPECIFIED TYPE: ICD-10-CM

## 2024-03-07 DIAGNOSIS — Z95.811 HEART REPLACED BY HEART ASSIST DEVICE: ICD-10-CM

## 2024-03-07 DIAGNOSIS — Z95.811 HEART REPLACED BY HEART ASSIST DEVICE: Primary | ICD-10-CM

## 2024-03-07 DIAGNOSIS — I42.8 NICM (NONISCHEMIC CARDIOMYOPATHY): ICD-10-CM

## 2024-03-07 DIAGNOSIS — Z95.811 LVAD (LEFT VENTRICULAR ASSIST DEVICE) PRESENT: ICD-10-CM

## 2024-03-07 DIAGNOSIS — I60.9 SUBARACHNOID HEMORRHAGE: ICD-10-CM

## 2024-03-07 DIAGNOSIS — R63.0 ANOREXIA: ICD-10-CM

## 2024-03-07 DIAGNOSIS — S06.5XAA BILATERAL SUBDURAL HEMATOMAS: ICD-10-CM

## 2024-03-07 LAB
ALBUMIN SERPL BCP-MCNC: 3.3 G/DL (ref 3.5–5.2)
ALP SERPL-CCNC: 128 U/L (ref 55–135)
ALT SERPL W/O P-5'-P-CCNC: 10 U/L (ref 10–44)
ANION GAP SERPL CALC-SCNC: 8 MMOL/L (ref 8–16)
AST SERPL-CCNC: 16 U/L (ref 10–40)
BASOPHILS # BLD AUTO: 0.04 K/UL (ref 0–0.2)
BASOPHILS NFR BLD: 0.4 % (ref 0–1.9)
BILIRUB DIRECT SERPL-MCNC: 0.6 MG/DL (ref 0.1–0.3)
BILIRUB SERPL-MCNC: 1.4 MG/DL (ref 0.1–1)
BNP SERPL-MCNC: 635 PG/ML (ref 0–99)
BUN SERPL-MCNC: 14 MG/DL (ref 8–23)
CALCIUM SERPL-MCNC: 9.7 MG/DL (ref 8.7–10.5)
CHLORIDE SERPL-SCNC: 106 MMOL/L (ref 95–110)
CO2 SERPL-SCNC: 25 MMOL/L (ref 23–29)
CREAT SERPL-MCNC: 0.9 MG/DL (ref 0.5–1.4)
CRP SERPL-MCNC: 29.1 MG/L (ref 0–8.2)
DIFFERENTIAL METHOD BLD: ABNORMAL
EOSINOPHIL # BLD AUTO: 0.1 K/UL (ref 0–0.5)
EOSINOPHIL NFR BLD: 1.4 % (ref 0–8)
ERYTHROCYTE [DISTWIDTH] IN BLOOD BY AUTOMATED COUNT: 14.7 % (ref 11.5–14.5)
EST. GFR  (NO RACE VARIABLE): >60 ML/MIN/1.73 M^2
GLUCOSE SERPL-MCNC: 100 MG/DL (ref 70–110)
HCT VFR BLD AUTO: 37.3 % (ref 40–54)
HGB BLD-MCNC: 11.7 G/DL (ref 14–18)
IMM GRANULOCYTES # BLD AUTO: 0.03 K/UL (ref 0–0.04)
IMM GRANULOCYTES NFR BLD AUTO: 0.3 % (ref 0–0.5)
INR PPP: 2.3 (ref 0.8–1.2)
LDH SERPL L TO P-CCNC: 200 U/L (ref 110–260)
LYMPHOCYTES # BLD AUTO: 2.1 K/UL (ref 1–4.8)
LYMPHOCYTES NFR BLD: 21.1 % (ref 18–48)
MAGNESIUM SERPL-MCNC: 2 MG/DL (ref 1.6–2.6)
MCH RBC QN AUTO: 28.9 PG (ref 27–31)
MCHC RBC AUTO-ENTMCNC: 31.4 G/DL (ref 32–36)
MCV RBC AUTO: 92 FL (ref 82–98)
MONOCYTES # BLD AUTO: 0.7 K/UL (ref 0.3–1)
MONOCYTES NFR BLD: 7.3 % (ref 4–15)
NEUTROPHILS # BLD AUTO: 7 K/UL (ref 1.8–7.7)
NEUTROPHILS NFR BLD: 69.5 % (ref 38–73)
NRBC BLD-RTO: 0 /100 WBC
PHOSPHATE SERPL-MCNC: 2.4 MG/DL (ref 2.7–4.5)
PLATELET # BLD AUTO: 189 K/UL (ref 150–450)
PMV BLD AUTO: 10.8 FL (ref 9.2–12.9)
POTASSIUM SERPL-SCNC: 4.2 MMOL/L (ref 3.5–5.1)
PREALB SERPL-MCNC: 11 MG/DL (ref 20–43)
PROT SERPL-MCNC: 9.7 G/DL (ref 6–8.4)
PROTHROMBIN TIME: 23.1 SEC (ref 9–12.5)
RBC # BLD AUTO: 4.05 M/UL (ref 4.6–6.2)
SODIUM SERPL-SCNC: 139 MMOL/L (ref 136–145)
WBC # BLD AUTO: 10.1 K/UL (ref 3.9–12.7)

## 2024-03-07 PROCEDURE — 84100 ASSAY OF PHOSPHORUS: CPT | Performed by: INTERNAL MEDICINE

## 2024-03-07 PROCEDURE — 1101F PT FALLS ASSESS-DOCD LE1/YR: CPT | Mod: CPTII,S$GLB,, | Performed by: INTERNAL MEDICINE

## 2024-03-07 PROCEDURE — 83615 LACTATE (LD) (LDH) ENZYME: CPT | Performed by: INTERNAL MEDICINE

## 2024-03-07 PROCEDURE — 93750 INTERROGATION VAD IN PERSON: CPT | Mod: S$GLB,,, | Performed by: INTERNAL MEDICINE

## 2024-03-07 PROCEDURE — 82248 BILIRUBIN DIRECT: CPT | Performed by: INTERNAL MEDICINE

## 2024-03-07 PROCEDURE — 83880 ASSAY OF NATRIURETIC PEPTIDE: CPT | Performed by: INTERNAL MEDICINE

## 2024-03-07 PROCEDURE — 85610 PROTHROMBIN TIME: CPT | Performed by: INTERNAL MEDICINE

## 2024-03-07 PROCEDURE — 99999 PR PBB SHADOW E&M-EST. PATIENT-LVL III: CPT | Mod: PBBFAC,,, | Performed by: INTERNAL MEDICINE

## 2024-03-07 PROCEDURE — 85025 COMPLETE CBC W/AUTO DIFF WBC: CPT | Performed by: INTERNAL MEDICINE

## 2024-03-07 PROCEDURE — 84134 ASSAY OF PREALBUMIN: CPT | Performed by: INTERNAL MEDICINE

## 2024-03-07 PROCEDURE — 80053 COMPREHEN METABOLIC PANEL: CPT | Performed by: INTERNAL MEDICINE

## 2024-03-07 PROCEDURE — 80307 DRUG TEST PRSMV CHEM ANLYZR: CPT | Performed by: INTERNAL MEDICINE

## 2024-03-07 PROCEDURE — 36415 COLL VENOUS BLD VENIPUNCTURE: CPT | Performed by: INTERNAL MEDICINE

## 2024-03-07 PROCEDURE — 86140 C-REACTIVE PROTEIN: CPT | Performed by: INTERNAL MEDICINE

## 2024-03-07 PROCEDURE — 83735 ASSAY OF MAGNESIUM: CPT | Performed by: INTERNAL MEDICINE

## 2024-03-07 PROCEDURE — 3008F BODY MASS INDEX DOCD: CPT | Mod: CPTII,S$GLB,, | Performed by: INTERNAL MEDICINE

## 2024-03-07 PROCEDURE — 99215 OFFICE O/P EST HI 40 MIN: CPT | Mod: S$GLB,,, | Performed by: INTERNAL MEDICINE

## 2024-03-07 PROCEDURE — 3288F FALL RISK ASSESSMENT DOCD: CPT | Mod: CPTII,S$GLB,, | Performed by: INTERNAL MEDICINE

## 2024-03-07 RX ORDER — MIRTAZAPINE 30 MG/1
30 TABLET, FILM COATED ORAL NIGHTLY
Qty: 30 TABLET | Refills: 11 | Status: SHIPPED | OUTPATIENT
Start: 2024-03-07 | End: 2024-06-17

## 2024-03-07 NOTE — TELEPHONE ENCOUNTER
Attempted to reach pt and unable to leave  Voicemail with appointment details in Palliative Medicine.

## 2024-03-07 NOTE — PROGRESS NOTES
"Transplant SW Note:    Met with pt and pts spouse, per pts choice, for pts visit today. Pt provided verbal permission to discuss all aspects of care and treatment, including regarding sensitive mental health and/or other psychosocial concerns.    Reviewed Epic including SW notes. Also see note by ALEKSANDER Anderson, dated 3/6/2024.      HCPA and EMERGENCY CONTACT:   Pts wife Meme France [phone] - pt reported has no changes and wife agreed.    Reviewed information with patient including caregivers, support system, and level of care needs. Provided resources at the end of this visit including for inpatient and outpatient rehabilitation for substance use and addiction, and for gambling addiction.    Pt openly discussed his history including recent/current drug use and positive lab work - positive for cocaine.     Pt also openly discussed his gambling addiction. Pt reported he is "not gambling or doing cocaine anymore." When asked, pt reported last used cocaine (snorted) "last week" and last gambled "last week."    Support:   Pts two adult dgtrs provide verbal support and encourage patient to quit cocaine use and gambling - see previous SW note by ALEKSANDER Anderson dated 3/6/2024 for both daughters' contact information.      Pts wife and patient have been  since "October 2023" per pt and wife's report today, however remain legally . Wife states will not stop attending pts appts and helping where she can, however will not tolerate pts drug use or gambling.    Pt resides alone at this time and stated manages his VAD without difficulty, performs own dressing changes, and takes medications as prescribed.     Pt reported [place] [id number] continues to visit pt (RN visit) weekly to take vital signs, review pts medications, and help place meds so patient can take them throughout the week. Pt stated this current level of support is enough at this time.     Pt also reported continues to take Remeron nightly for sleep and " stated the Remeron helps, definitely. Pt denied taking any other mental health-related or sleep-related medications.    Pt reported is not experiencing and has not experienced suicidal or homicidal ideation, and agreed to access 911 and mental health resources as needed or recommended.    Pt discussed history of stroke and reported no difficulty driving, writing, or with other activities of daily living. Wife stated agreement with pts statements.    INS: [employer] - pt states no changes.     Reviewed mental health resources, how to access, and provided motivational interviewing, counseling, education, and encouragement without judgement or bias.     Pt stated will engage with recommended resources as provided and discussed today.     Discussed goal-setting, relationship-building with friends/family, and methods to cope through current state, including for patient to actively participate in rehab. Pt agreed to call back with any concerns and contact information provided today. Wife also provided with resource review and contact information to use if needed.    Transplant SW Team remains available. No other needs indicated or reported at this time. Wife denied need for resources for self. Pt was receptive to resources provided.

## 2024-03-07 NOTE — PROGRESS NOTES
Patient reports correct Warfarin dose, constipation, spinach 3/6/2024  no other changes reported.

## 2024-03-07 NOTE — PROGRESS NOTES
"Subjective:   Patient ID:  Rocco France is a 68 y.o. male who presents for LVAD followup visit.    Implant Date: 1/13/2021     Heartmate 3 RPM 5200     INR goal: 2-2.5  Bridge with Heparin         3/7/2024     9:32 AM 12/7/2023    11:13 AM 10/4/2023     9:38 AM 7/6/2023     9:06 AM 5/23/2023     4:28 AM 5/22/2023    11:52 PM 5/22/2023     8:52 PM   TXP JACEY INTERROGATIONS   Type HeartMate3 HeartMate3 HeartMate3 HeartMate3      Flow 4.3 4.5 4 3.7      Speed 5200 5200 5200 5200      PI 6.4 5.7 8.3 8.9      Power (Edwards) 3.8 3.8 3.7 3.6      LSL 4800 4800 4800 4800      Pulsatility Pulse Pulse Pulse Intermittent pulse Pulse Pulse Pulse     HPI  Very pleasant 69 yo BM with stage D CHF due to NICM underwent HM3 implantation 1/13/2021 with sternal closure 1/14/2021.  He was admitted for syncope 1/27/2022 at which time he was found to have a evolving right cerebral acute subdural hematoma and acute basal cistern subarachnoid hemorrhage. He required intubation with prolonged hospitalization and was incidentally found to be positive for COVID-19 and treated with remdesivir. He was taken off of aspirin and discharged home 2/17/2022. He was readmitted 3/2022 for 3 days due to dysequilibrium, diplopia and repeat CT head was stable. Neurology recommended myasthenia gravis workup which was negative.  He has done well since from an LVAD standpoint but last weekend I received  a call from his local ED inforimg me that he had been dealing with drug addiction (crack/cocaine) and was seeking help and requested to be admitted to an inpatient rehab. We explained to him that with his LVAD only outpatient rehab was an option.  VAD speed is at 5200 rpm. No VAD alarms noted on interrogation occasional PI events . BP is 92 mm of Hg. DLES is a "1". INR is therapeutic at 2.3. LDh is at baseline.     Review of Systems   Constitutional: Negative. Negative for chills, decreased appetite, diaphoresis, fever, malaise/fatigue, night sweats, weight " "gain and weight loss.   Eyes: Negative.    Cardiovascular:  Negative for chest pain, claudication, cyanosis, dyspnea on exertion, irregular heartbeat, leg swelling, near-syncope, orthopnea, palpitations, paroxysmal nocturnal dyspnea and syncope.   Respiratory:  Negative for cough, hemoptysis and shortness of breath.    Endocrine: Negative.    Hematologic/Lymphatic: Negative.    Skin:  Negative for color change, dry skin and nail changes.   Musculoskeletal: Negative.    Gastrointestinal: Negative.    Genitourinary: Negative.    Neurological:  Negative for weakness.       Objective:   Blood pressure (!) 92/0, pulse 82, temperature 97.8 °F (36.6 °C), temperature source Oral, height 6' (1.829 m), weight 79.4 kg (175 lb).body mass index is 23.73 kg/m².    Doppler: 92    Physical Exam  Vitals reviewed.   Constitutional:       Appearance: He is well-developed.      Comments: BP (!) 92/0 (BP Location: Left arm, Patient Position: Sitting)   Pulse 82   Temp 97.8 °F (36.6 °C) (Oral)   Ht 6' (1.829 m)   Wt 79.4 kg (175 lb)   BMI 23.73 kg/m²      HENT:      Head: Normocephalic.   Neck:      Vascular: No carotid bruit or JVD.   Cardiovascular:      Heart sounds: No murmur heard.     Comments: Smooth VAD hum. DLES is "1"  Pulmonary:      Effort: Pulmonary effort is normal.      Breath sounds: Normal breath sounds.   Abdominal:      General: Bowel sounds are normal.      Palpations: Abdomen is soft.   Skin:     General: Skin is warm.   Neurological:      Mental Status: He is alert.         Lab Results   Component Value Date    WBC 10.10 03/07/2024    HGB 11.7 (L) 03/07/2024    HCT 37.3 (L) 03/07/2024    MCV 92 03/07/2024     03/07/2024    CO2 25 03/07/2024    CREATININE 0.9 03/07/2024    CALCIUM 9.7 03/07/2024    ALBUMIN 3.3 (L) 03/07/2024    AST 16 03/07/2024     (H) 03/07/2024    ALT 10 03/07/2024     03/07/2024       Lab Results   Component Value Date    INR 2.3 (H) 03/07/2024    INR 1.6 02/29/2024    " INR 1.3 02/22/2024       BNP   Date Value Ref Range Status   03/07/2024 635 (H) 0 - 99 pg/mL Final     Comment:     Values of less than 100 pg/ml are consistent with non-CHF populations.   12/07/2023 517 (H) 0 - 99 pg/mL Final     Comment:     Values of less than 100 pg/ml are consistent with non-CHF populations.   10/04/2023 132 (H) 0 - 99 pg/mL Final     Comment:     Values of less than 100 pg/ml are consistent with non-CHF populations.       LD   Date Value Ref Range Status   03/07/2024 200 110 - 260 U/L Final     Comment:     Results are increased in hemolyzed samples.   12/07/2023 161 110 - 260 U/L Final     Comment:     Results are increased in hemolyzed samples.   10/04/2023 162 110 - 260 U/L Final     Comment:     Results are increased in hemolyzed samples.       Assessment:      1. Heart replaced by heart assist device    2. Chronic combined systolic and diastolic heart failure    3. NICM (nonischemic cardiomyopathy)    4. ICD (implantable cardioverter-defibrillator) in place    5. Essential hypertension    6. Type 2 diabetes mellitus without complication, without long-term current use of insulin    7. Mixed hyperlipidemia    8. Bilateral subdural hematomas    9. Subarachnoid hemorrhage        Plan:   Patient is now NYHA class II. BP is now better controlled.  INR is therapeutic.  Patient will meet today with our  to help him find an outpatient drug rehab   VAD interrogation was performed in clinic  Any VAD management kits dispensed today medically necessary  Recommend 2 gram sodium restriction and 1500cc fluid restriction.  Encourage physical activity with graded exercise program.  Requested patient to weigh themselves daily, and to notify us if their weight increases by more than 3 lbs in 1 day or 5 lbs in 1 week.   Additionally, the patient has a patient centered goal of being able to recover from his drug addiction    RTC in 3 months with labs      Listed for transplant: No. Implanted as  DT.      MLOS Patient Status  Functional Status: 90% - Able to carry on normal activity: minor symptoms of disease  Physical Capacity: No Limitations  Working for Income: No  If no, reason not working: Demands of Treatment     Mike Chauhan MD

## 2024-03-07 NOTE — LETTER
March 7, 2024        Teresa Mendoza  [address]  [address]  [place] [address]  Phone: [phone]  Fax: [phone]             [facility]  [address]  [place] [address]  Phone: [phone]   Patient: Rocco France   MR Number: [id number]   YOB: 1955   Date of Visit: 3/7/2024       Dear Dr. Teresa Mendoza    Thank you for referring Rocco France to me for evaluation. Attached you will find relevant portions of my assessment and plan of care.    If you have questions, please do not hesitate to call me. I look forward to following Rocco France along with you.    Sincerely,    Mike Chauhan MD    Enclosure    If you would like to receive this communication electronically, please contact [email or web address] or [phone] to request [facility] Link access.    [facility] Link is a tool which provides read-only access to select patient information with whom you have a relationship. Its easy to use and provides real time access to review your patients record including encounter summaries, notes, results, and demographic information.    If you feel you have received this communication in error or would no longer like to receive these types of communications, please e-mail [email or web address]

## 2024-03-07 NOTE — PROGRESS NOTES
[facility]    2024 9:43 AM    Assessment/Plan:    Patient presents today with supratherapeutic INR.    Assessment of patient findings and chart review: no significant findings     Recommendation for patient's warfarin regimen: Lower dose today to 2.5mg then resume current maintenance dose    Recommend repeat INR Monday  _________________________________________________________________    Rocco ESTRADA Román (68 y.o.) is followed by the [facility].    Anticoagulation Summary  As of 3/7/2024      INR goal:  1.5-2.0   TTR:  54.4 % (2.9 y)   INR used for dosin.3 (3/7/2024)   Warfarin maintenance plan:  2.5 mg (5 mg x 0.5) every Wed; 5 mg (5 mg x 1) all other days   Weekly warfarin total:  32.5 mg   Plan last modified:  Cheryl Barahona, PharmD (2024)   Next INR check:  3/11/2024   Target end date:      Indications    LVAD (left ventricular assist device) present [Z95.811]                 Anticoagulation Episode Summary       INR check location:      Preferred lab:      Send INR reminders to:  [facility] COUMADIN LVAD    Comments:  LVAD // Home Health 2000 [phone]  //[facility] ph [phone]/ results Lab - [phone]          Anticoagulation Care Providers       Provider Role Specialty Phone number    Pete Garnett MD [facility] Cardiology [phone]

## 2024-03-07 NOTE — PROCEDURES
3/7/2024     9:32 AM 12/7/2023    11:13 AM 10/4/2023     9:38 AM 7/6/2023     9:06 AM 5/23/2023     4:28 AM 5/22/2023    11:52 PM 5/22/2023     8:52 PM   TXP JACEY INTERROGATIONS   Type HeartMate3 HeartMate3 HeartMate3 HeartMate3      Flow 4.3 4.5 4 3.7      Speed 5200 5200 5200 5200      PI 6.4 5.7 8.3 8.9      Power (Edwards) 3.8 3.8 3.7 3.6      LSL 4800 4800 4800 4800      Pulsatility Pulse Pulse Pulse Intermittent pulse Pulse Pulse Pulse   }

## 2024-03-07 NOTE — PROGRESS NOTES
Date of Implant with Heartmate 3 LVAD: 11/13/2021    PATIENT ARRIVED IN CLINIC:  Ambulatory   Accompanied by: spouse  Complaints/reason for visit today: routine    Vitals  Temperature, oral:   Temp Readings from Last 1 Encounters:   03/07/24 97.8 °F (36.6 °C) (Oral)     Blood Pressure:   BP Readings from Last 3 Encounters:   03/07/24 (!) 92/0   12/07/23 (!) 88/0   10/04/23 (!) 120/0        VAD Interrogation:      3/7/2024     9:32 AM 12/7/2023    11:13 AM 10/4/2023     9:38 AM   TXP JACEY INTERROGATIONS   Type HeartMate3 HeartMate3 HeartMate3   Flow 4.3 4.5 4   Speed 5200 5200 5200   PI 6.4 5.7 8.3   Power (Edwards) 3.8 3.8 3.7   LSL 4800 4800 4800   Pulsatility Pulse Pulse Pulse     HCT:   Lab Results   Component Value Date    HCT 37.3 (L) 03/07/2024    HCT 30 (L) 03/22/2022       Problems / Issues / Alarms with VAD if any: None noted  VAD Sounds: HM3 Smooth      Equipment:  Emergency Equipment With Patient: Yes  Any Equipment Issues: None noted   It is medically necessary to ensure patient has properly functioning equipment and wearables to prevent infection, injury or death to patient.     DLES Assessment and Dressing Care:  Appearance of DLES: 1  Antibiotics: NO  Velour: No  Manual & Visual Inspection Of Driveline: No kinks or tears noted  Stabilization Device In Use: yes, [place] securement device    Heartmate 3 Module Cable:  No yellow exposed and Attempted to unscrew modular cable to ensure it will be able to come lose in the event we ever need to change the modular cable while patient held the driveline in place so it would not move. Modular cable connection able to be unscrewed and re-tightened. Instructed pt to perform this weekly.  Frequency of Dressing Changes: every three days & daily kit   Pt In Need Of Management Kits?:no   It is medically necessary to have VAD management kits in order to prevent infection or to assist in the healing of an infected DLES.    Patient MyChart Questionnaire:        No data to  display                 Assessment/ Quality of Life Survery:   Complaints Of Nausea / Vomiting: None noted    Appearance and Frequency Of Stools: normal and formed without blood & daily  Color Of Urine: clear/yellow  Pain: NO  Coping/Depression/Anxiety: depressed  Sleep Habits: 4-5 hrs /night  Sleep Aids:  remeron  Showering: Yes, reminded to change dressing immediately after drying off  Self- Care I have no problems with self- care  Mobility I have no problems walking about  Usual Activities I have no problems with performing my usual activities  Activity/Exercise: walking   Driving: Yes. Reminded to pull over should there be an alarm before looking down at controller.  Additional Comments:     Labs:    Chemistry        Component Value Date/Time     03/07/2024 0801    K 4.2 03/07/2024 0801     03/07/2024 0801    CO2 25 03/07/2024 0801    BUN 14 03/07/2024 0801    CREATININE 0.9 03/07/2024 0801     03/07/2024 0801        Component Value Date/Time    CALCIUM 9.7 03/07/2024 0801    ALKPHOS 128 03/07/2024 0801    AST 16 03/07/2024 0801    ALT 10 03/07/2024 0801    BILITOT 1.4 (H) 03/07/2024 0801    ESTGFRAFRICA 55.3 (A) 07/13/2022 0834    EGFRNONAA 47.8 (A) 07/13/2022 0834            Magnesium   Date Value Ref Range Status   03/07/2024 2.0 1.6 - 2.6 mg/dL Final       Lab Results   Component Value Date    WBC 10.10 03/07/2024    HGB 11.7 (L) 03/07/2024    HCT 37.3 (L) 03/07/2024    MCV 92 03/07/2024     03/07/2024       Lab Results   Component Value Date    INR 2.3 (H) 03/07/2024    INR 1.6 02/29/2024    INR 1.3 02/22/2024       BNP   Date Value Ref Range Status   03/07/2024 635 (H) 0 - 99 pg/mL Final     Comment:     Values of less than 100 pg/ml are consistent with non-CHF populations.   12/07/2023 517 (H) 0 - 99 pg/mL Final     Comment:     Values of less than 100 pg/ml are consistent with non-CHF populations.   10/04/2023 132 (H) 0 - 99 pg/mL Final     Comment:     Values of less than 100  pg/ml are consistent with non-CHF populations.       LD   Date Value Ref Range Status   03/07/2024 200 110 - 260 U/L Final     Comment:     Results are increased in hemolyzed samples.   12/07/2023 161 110 - 260 U/L Final     Comment:     Results are increased in hemolyzed samples.   10/04/2023 162 110 - 260 U/L Final     Comment:     Results are increased in hemolyzed samples.       Labs reviewed with patient: YES     Medication Reconciliation: per MA.  New Medication Detail Provided: yes  Coumadin Managed by: [facility]    Education: Reviewed driveline care, emergency procedures, how to change the controller, alarms with patient, as well as discussed how to page the VAD coordinator in case of an emergency.   Educated patient/family that chest compressions are allowed in the event they are needed.    Reminded patient/caregiver not to touch their face and to cover their mouth when they cough or sneeze.   Vaccines: Pt informed that we are encouraging all VAD patients to receive the Flu and COVID vaccines and boosters. Informed pt that they can take tylenol but should avoid other NSAIDs.       Plans/Needs: Routine f/u w/ Dr Chauhan. Patient reports feeling depressed, visited local ER recently for assistance with substance abuse. Requesting information on outpatient drug rehab programs, seen by SW today. Message sent to Dr Hilliard to request refill of remeron.     RTC 3 months       Hurricane Season: No

## 2024-03-11 ENCOUNTER — ANTI-COAG VISIT (OUTPATIENT)
Dept: CARDIOLOGY | Facility: CLINIC | Age: 69
End: 2024-03-11
Payer: MEDICARE

## 2024-03-11 DIAGNOSIS — Z95.811 LVAD (LEFT VENTRICULAR ASSIST DEVICE) PRESENT: Primary | ICD-10-CM

## 2024-03-11 LAB
AMPHETAMINES SERPL QL: NEGATIVE
BARBITURATES SERPL QL SCN: NEGATIVE
BENZODIAZ SERPL QL SCN: NEGATIVE
BZE SERPL QL: NEGATIVE
CARBOXYTHC SERPL QL SCN: NEGATIVE
ETHANOL SERPL QL SCN: NEGATIVE
INR PPP: 1.9
METHADONE SERPL QL SCN: NEGATIVE
OPIATES SERPL QL SCN: NEGATIVE
PCP SERPL QL SCN: NEGATIVE
PROPOXYPH SERPL QL: NEGATIVE

## 2024-03-11 PROCEDURE — 93793 ANTICOAG MGMT PT WARFARIN: CPT | Mod: S$GLB,,,

## 2024-03-11 NOTE — PROGRESS NOTES
[facility]    2024 12:35 PM    Assessment/Plan:    Patient presents today with therapeutic INR.    Assessment of patient findings and chart review: no significant findings     Recommendation for patient's warfarin regimen: Continue current maintenance dose    Recommend repeat INR Thursday  _________________________________________________________________    Rocco NATALIE [place] (68 y.o.) is followed by the [facility].    Anticoagulation Summary  As of 3/11/2024      INR goal:  1.5-2.0   TTR:  54.3 % (2.9 y)   INR used for dosin.9 (3/11/2024)   Warfarin maintenance plan:  2.5 mg (5 mg x 0.5) every Wed; 5 mg (5 mg x 1) all other days   Weekly warfarin total:  32.5 mg   Plan last modified:  Cheryl Barahona, PharmD (2024)   Next INR check:  3/14/2024   Target end date:      Indications    LVAD (left ventricular assist device) present [Z95.811]                 Anticoagulation Episode Summary       INR check location:      Preferred lab:      Send INR reminders to:  [facility] LVAD    Comments:  LVAD // Home Health  [phone]  //[facility] ph [phone]/ results Lab - [phone]          Anticoagulation Care Providers       Provider Role Specialty Phone number    Pete Garnett MD [facility] [phone]

## 2024-03-12 ENCOUNTER — TELEPHONE (OUTPATIENT)
Dept: TRANSPLANT | Facility: CLINIC | Age: 69
End: 2024-03-12
Payer: MEDICARE

## 2024-03-14 ENCOUNTER — ANTI-COAG VISIT (OUTPATIENT)
Dept: CARDIOLOGY | Facility: CLINIC | Age: 69
End: 2024-03-14
Payer: MEDICARE

## 2024-03-14 DIAGNOSIS — Z95.811 LVAD (LEFT VENTRICULAR ASSIST DEVICE) PRESENT: Primary | ICD-10-CM

## 2024-03-14 LAB — INR PPP: 2

## 2024-03-14 PROCEDURE — 93793 ANTICOAG MGMT PT WARFARIN: CPT | Mod: S$GLB,,,

## 2024-03-14 NOTE — PROGRESS NOTES
[facility]    2024 10:31 AM    Assessment/Plan:    Patient presents today with therapeutic INR.    Assessment of patient findings and chart review: no significant findings     Recommendation for patient's warfarin regimen: Continue current maintenance dose    Recommend repeat INR in 1 week  _________________________________________________________________    Rocco NATALIE [place] (68 y.o.) is followed by the [facility].    Anticoagulation Summary  As of 3/14/2024      INR goal:  1.5-2.0   TTR:  54.4 % (2.9 y)   INR used for dosin.0 (3/14/2024)   Warfarin maintenance plan:  2.5 mg (5 mg x 0.5) every Wed; 5 mg (5 mg x 1) all other days   Weekly warfarin total:  32.5 mg   Plan last modified:  Cheryl Barahona, PharmD (2024)   Next INR check:  3/21/2024   Target end date:      Indications    LVAD (left ventricular assist device) present [Z95.811]                 Anticoagulation Episode Summary       INR check location:      Preferred lab:      Send INR reminders to:  [facility] LV[facility]    Comments:  LVAD // Home Health  [phone]  //[facility] ph [phone]/ results Lab - [phone]          Anticoagulation Care Providers       Provider Role Specialty Phone number    Pete Garnett MD [facility] Cardiology [phone]

## 2024-03-15 ENCOUNTER — TELEPHONE (OUTPATIENT)
Dept: TRANSPLANT | Facility: CLINIC | Age: 69
End: 2024-03-15
Payer: MEDICARE

## 2024-03-15 NOTE — TELEPHONE ENCOUNTER
Called home health requesting lab results.  I was informed home health doesn't do his labs because he is a difficult stick. I asked if they can obtain the lab results and fax to us please and was told yes.     3/19/24 9788: Labs not received yet.  Called  again to inquire about lab results.  Spoke with Frances.She reports the person who can obtain these is gone for the day.  She will send her a message to fax results.

## 2024-03-18 ENCOUNTER — ANTI-COAG VISIT (OUTPATIENT)
Dept: CARDIOLOGY | Facility: CLINIC | Age: 69
End: 2024-03-18
Payer: MEDICARE

## 2024-03-18 DIAGNOSIS — Z95.811 LVAD (LEFT VENTRICULAR ASSIST DEVICE) PRESENT: Primary | ICD-10-CM

## 2024-03-18 LAB — INR PPP: 1.8

## 2024-03-18 PROCEDURE — 93793 ANTICOAG MGMT PT WARFARIN: CPT | Mod: S$GLB,,,

## 2024-03-18 NOTE — PROGRESS NOTES
[facility]    2024 10:00 AM    Assessment/Plan:    Patient presents today with therapeutic INR.    Assessment of patient findings and chart review: no significant findings     Recommendation for patient's warfarin regimen: Continue current maintenance dose    Recommend repeat INR in 1 week  _________________________________________________________________    Rocco NATALIE [place] (68 y.o.) is followed by the [facility].    Anticoagulation Summary  As of 3/18/2024      INR goal:  1.5-2.0   TTR:  54.6 % (2.9 y)   INR used for dosin.8 (3/18/2024)   Warfarin maintenance plan:  2.5 mg (5 mg x 0.5) every Wed; 5 mg (5 mg x 1) all other days   Weekly warfarin total:  32.5 mg   Plan last modified:  Cheryl Barahona, PharmD (2024)   Next INR check:  3/25/2024   Target end date:      Indications    LVAD (left ventricular assist device) present [Z95.811]                 Anticoagulation Episode Summary       INR check location:      Preferred lab:      Send INR reminders to:  [facility] LVAD    Comments:  LVAD // Home Health  [phone]  //[facility] ph [phone]/ results Lab - [phone]          Anticoagulation Care Providers       Provider Role Specialty Phone number    Pete Garnett MD [facility] Cardiology [phone]

## 2024-03-20 ENCOUNTER — TELEPHONE (OUTPATIENT)
Dept: TRANSPLANT | Facility: CLINIC | Age: 69
End: 2024-03-20
Payer: MEDICARE

## 2024-03-20 NOTE — LETTER
Date Sent:  03/20/2024      Rocco ESTRADA Román  1955      This patient will require the following lab(s). The patient will be instructed to report to your facility for their lab to be drawn.  Please fax all results to [phone]. INR results to be faxed to [phone]    LABS ORDERED   ICD 10    DATE REQUESTED:  Complete Metabolic Panel                ICD Z95.811                                        STAT 3/25/24   BNP Level                                        ICD Z95.811 / I50.9                             STAT 3/25/24          Zaida Renner, BRODIEN, RN, CCRN, VAD-C [phone]   BRODIE ThomasN, RN, CV--[phone]   Mariela Barrow, MSN, RN, CV--[phone]   Kacey Barr, [phone]       Fax all results to [phone].  Fax INR results to [phone].  Sincerely,      Deana Lake M.D.  Medical Director, Mechanical Circulatory Device Support Program  Section of Cardiomyopathy & Heart Transplantation

## 2024-03-20 NOTE — TELEPHONE ENCOUNTER
LVAD // [facility] 2000 [phone]  //[facility] ph [phone]/ results Lab - [phone]   Called all labs ([facility] and [facility]) as well as patient, no answer at this time by patient, [facility], [facility] transferred us to a phone line that has a voicemail that is full and then \[facility]\"" has not run any labs except a PT/INR.

## 2024-03-20 NOTE — TELEPHONE ENCOUNTER
GEORGINA Barr received information that [facility] did not send orders to lab since they would not draw them. New Orders sent to [facility] for Monday, next INR.    LVAD // [facility] 2000 [phone]/ [phone]  //[facility] ph [phone]/ results Lab - [phone]

## 2024-03-25 ENCOUNTER — ANTI-COAG VISIT (OUTPATIENT)
Dept: CARDIOLOGY | Facility: CLINIC | Age: 69
End: 2024-03-25
Payer: MEDICARE

## 2024-03-25 ENCOUNTER — TELEPHONE (OUTPATIENT)
Dept: TRANSPLANT | Facility: CLINIC | Age: 69
End: 2024-03-25
Payer: MEDICARE

## 2024-03-25 DIAGNOSIS — Z95.811 LVAD (LEFT VENTRICULAR ASSIST DEVICE) PRESENT: Primary | ICD-10-CM

## 2024-03-25 LAB
EXT NT PROBNP: 5948
INR PPP: 2.6

## 2024-03-25 PROCEDURE — 93793 ANTICOAG MGMT PT WARFARIN: CPT | Mod: S$GLB,,,

## 2024-03-25 RX ORDER — DIGOXIN 125 MCG
0.12 TABLET ORAL
Qty: 30 TABLET | Refills: 0 | Status: SHIPPED | OUTPATIENT
Start: 2024-03-25 | End: 2024-05-24 | Stop reason: SDUPTHER

## 2024-03-25 NOTE — PROGRESS NOTES
Patient called and was given lab result, he verified correct coumadin dose, reports nose bleed on outside of nose when washing face--has pimple like scab, needs to have ALL teeth extracted--no date set yet, had cranberry juice Friday, reports less greens,  no other changes repeorted

## 2024-03-25 NOTE — TELEPHONE ENCOUNTER
----- Message from Mariela Barrow RN sent at 3/20/2024  3:55 PM CDT -----  Regarding: FW: CMP, BNP: clinic /[facility] ph [phone]// [phone] results Lab - [phone]  ----- Message -----  From: Kacey Barr RN  Sent: 3/12/2024   9:49 AM CDT  To: [facility] Lvad Clinical  Subject: CMP, BNP: clinic f/u                             [facility] 2000, ph [phone], fax [phone]

## 2024-03-25 NOTE — PROGRESS NOTES
[facility]    2024 1:19 PM    Assessment/Plan:    Patient presents today with supratherapeutic INR.    Assessment of patient findings and chart review: INR above goal; patient reports dietary changes that could account (minor bleeding event from facial scab).    Recommendation for patient's warfarin regimen: Lower dose today to 2.5mg then resume current maintenance dose    Recommend repeat INR in 3 days  _________________________________________________________________    Rocco [place] (68 y.o.) is followed by the [facility].    Anticoagulation Summary  As of 3/25/2024      INR goal:  1.5-2.0   TTR:  54.4 % (3 y)   INR used for dosin.6 (3/25/2024)   Warfarin maintenance plan:  2.5 mg (5 mg x 0.5) every Wed; 5 mg (5 mg x 1) all other days   Weekly warfarin total:  32.5 mg   Plan last modified:  Cheryl Barahona, PharmD (2024)   Next INR check:  3/27/2024   Target end date:      Indications    LVAD (left ventricular assist device) present [Z95.811]                 Anticoagulation Episode Summary       INR check location:      Preferred lab:      Send INR reminders to:  [facility] COUMADIN LVAD    Comments:  LVAD // Home Health  [phone]  //[facility] ph [phone]/ results Lab - [phone]          Anticoagulation Care Providers       Provider Role Specialty Phone number    Pete Garnett MD [facility] Cardiology [phone]

## 2024-03-26 ENCOUNTER — TELEPHONE (OUTPATIENT)
Dept: TRANSPLANT | Facility: CLINIC | Age: 69
End: 2024-03-26
Payer: MEDICARE

## 2024-03-26 LAB
EXT ALBUMIN: 3
EXT ALT: 15
EXT AST: 17
EXT BILIRUBIN TOTAL: 1.1
EXT BUN: 20
EXT CALCIUM: 8.6
EXT CHLORIDE: 104
EXT CREATININE: 1.3 MG/DL
EXT GLUCOSE: 120
EXT POTASSIUM: 4.2
EXT PROTEIN TOTAL: 8.8
EXT SODIUM: 139 MMOL/L

## 2024-03-26 NOTE — TELEPHONE ENCOUNTER
CMP was faxed after being told that they were not drawn. NT-ProBNP. Attempted to contact patient regarding labs. No answer and no voicemail set up. Unable to leave a Voicemail.

## 2024-03-26 NOTE — LETTER
Date Sent:  03/26/2024      Rocco ESTRADA Roámn  1955      This patient will require the following lab(s). The patient will be instructed to report to your facility for their lab to be drawn.  Please fax all results to [phone]. INR results to be faxed to [phone]    LABS ORDERED   ICD 10    DATE REQUESTED:  Complete Metabolic Panel                ICD Z95.811                                        STAT 3/27/24   BNP Level                                        ICD Z95.811 / I50.9                             STAT 3/27/24          Zaida Renner, BRODIEN, RN, CCRN, VAD-C [phone]   BRODIE ThomasN, RN, CV--[phone]   Mariela Barrow, MSN, RN, CV--[phone]   Kacey Barr, [phone]       Fax all results to [phone].  Fax INR results to [phone].  Sincerely,      Deana Lake M.D.  Medical Director, Mechanical Circulatory Device Support Program  Section of Cardiomyopathy & Heart Transplantation

## 2024-03-26 NOTE — TELEPHONE ENCOUNTER
Called patient's lab, lab reported that they did not receive the order for the CMP only the BNP so a CMP was not run. New Orders faxed for tomorrow in attempts to get labs drawn.

## 2024-03-27 ENCOUNTER — ANTI-COAG VISIT (OUTPATIENT)
Dept: CARDIOLOGY | Facility: CLINIC | Age: 69
End: 2024-03-27
Payer: MEDICARE

## 2024-03-27 DIAGNOSIS — Z95.811 LVAD (LEFT VENTRICULAR ASSIST DEVICE) PRESENT: Primary | ICD-10-CM

## 2024-03-27 LAB — INR PPP: 1.8

## 2024-03-27 PROCEDURE — 93793 ANTICOAG MGMT PT WARFARIN: CPT | Mod: S$GLB,,,

## 2024-03-27 NOTE — PROGRESS NOTES
[facility]    2024 10:34 AM    Assessment/Plan:    Patient presents today with therapeutic INR.    Assessment of patient findings and chart review: no significant findings     Recommendation for patient's warfarin regimen: Continue current maintenance dose    Recommend repeat INR Monday  _________________________________________________________________    Rocco ESTRADA Román (68 y.o.) is followed by the [facility].    Anticoagulation Summary  As of 3/27/2024      INR goal:  1.5-2.0   TTR:  54.4 % (3 y)   INR used for dosin.8 (3/27/2024)   Warfarin maintenance plan:  2.5 mg (5 mg x 0.5) every Wed; 5 mg (5 mg x 1) all other days   Weekly warfarin total:  32.5 mg   Plan last modified:  Cheryl Barahona, PharmD (2024)   Next INR check:  2024   Target end date:      Indications    LVAD (left ventricular assist device) present [Z95.811]                 Anticoagulation Episode Summary       INR check location:      Preferred lab:      Send INR reminders to:  [facility] COUMADIN LVAD    Comments:  LVAD // Home Health  [phone]  //[facility] ph [phone]/ results Lab - [phone]          Anticoagulation Care Providers       Provider Role Specialty Phone number    Pete Garnett MD [facility] Cardiology [phone]

## 2024-04-01 ENCOUNTER — ANTI-COAG VISIT (OUTPATIENT)
Dept: CARDIOLOGY | Facility: CLINIC | Age: 69
End: 2024-04-01
Payer: MEDICARE

## 2024-04-01 DIAGNOSIS — Z95.811 LVAD (LEFT VENTRICULAR ASSIST DEVICE) PRESENT: Primary | ICD-10-CM

## 2024-04-01 LAB — INR PPP: 2.6

## 2024-04-01 PROCEDURE — 93793 ANTICOAG MGMT PT WARFARIN: CPT | Mod: S$GLB,,,

## 2024-04-01 NOTE — PROGRESS NOTES
[facility]    2024 3:30 PM    Assessment/Plan:    Patient presents today with supratherapeutic INR.    Assessment of patient findings and chart review: no significant findings     Recommendation for patient's warfarin regimen: Hold dose today then decrease maintenance dose    Recommend repeat INR Thursday  _________________________________________________________________    Rocco ESTRADA Román (68 y.o.) is followed by the [facility].    Anticoagulation Summary  As of 2024      INR goal:  1.5-2.0   TTR:  54.2 % (3 y)   INR used for dosin.6 (2024)   Warfarin maintenance plan:  2.5 mg (5 mg x 0.5) every Mon, Wed; 5 mg (5 mg x 1) all other days   Weekly warfarin total:  30 mg   Plan last modified:  Cheryl Barahona, PharmD (2024)   Next INR check:  2024   Target end date:      Indications    LVAD (left ventricular assist device) present [Z95.811]                 Anticoagulation Episode Summary       INR check location:      Preferred lab:      Send INR reminders to:  [facility] COUMADIN LVAD    Comments:  LVAD // Home Health  [phone]  //[facility] ph [phone]/ results Lab - [phone]          Anticoagulation Care Providers       Provider Role Specialty Phone number    Pete Garnett MD [facility] Cardiology [phone]

## 2024-04-04 ENCOUNTER — ANTI-COAG VISIT (OUTPATIENT)
Dept: CARDIOLOGY | Facility: CLINIC | Age: 69
End: 2024-04-04
Payer: MEDICARE

## 2024-04-04 DIAGNOSIS — Z95.811 LVAD (LEFT VENTRICULAR ASSIST DEVICE) PRESENT: Primary | ICD-10-CM

## 2024-04-04 LAB — INR PPP: 2

## 2024-04-04 PROCEDURE — 93793 ANTICOAG MGMT PT WARFARIN: CPT | Mod: S$GLB,,,

## 2024-04-04 NOTE — PROGRESS NOTES
[facility]    04/04/2024 10:11 AM    Rocco France (68 y.o.) is followed by the [facility].      Assessment/Plan:    Rocco France presents today with therapeutic INR. Goal INR  1.5-2    Assessment of patient findings per MA/LPN and chart review:   The following significant findings were found:  None    Recommendation for patient's warfarin regimen:   Weekly warfarin dose decreased due to repeated supratherapeutic INRs.    Recommended repeat INR in 4 days      Cassidy Snider, PharmD, BCPS  Clinical Pharmacist - Coumadin Clinic  Preferred Contact: Secure Messaging or In Basket Message

## 2024-04-08 ENCOUNTER — ANTI-COAG VISIT (OUTPATIENT)
Dept: CARDIOLOGY | Facility: CLINIC | Age: 69
End: 2024-04-08
Payer: MEDICARE

## 2024-04-08 DIAGNOSIS — Z95.811 LVAD (LEFT VENTRICULAR ASSIST DEVICE) PRESENT: Primary | ICD-10-CM

## 2024-04-08 LAB — INR PPP: 1.7

## 2024-04-08 PROCEDURE — 93793 ANTICOAG MGMT PT WARFARIN: CPT | Mod: S$GLB,,,

## 2024-04-08 NOTE — PROGRESS NOTES
[facility]    2024 9:53 AM    Assessment/Plan:    Patient presents today with therapeutic INR.    Assessment of patient findings and chart review: no significant findings     Recommendation for patient's warfarin regimen: Continue current maintenance dose    Recommend repeat INR Friday  _________________________________________________________________    Rocco NATALIE [place] (68 y.o.) is followed by the [facility].    Anticoagulation Summary  As of 2024      INR goal:  1.5-2.0   TTR:  54.3 % (3 y)   INR used for dosin.7 (2024)   Warfarin maintenance plan:  2.5 mg (5 mg x 0.5) every Mon, Wed, Fri; 5 mg (5 mg x 1) all other days   Weekly warfarin total:  27.5 mg   Plan last modified:  Cassidy Snider, PharmD (2024)   Next INR check:  2024   Target end date:      Indications    LVAD (left ventricular assist device) present [Z95.811]                 Anticoagulation Episode Summary       INR check location:      Preferred lab:      Send INR reminders to:  [facility] LVAD    Comments:  LVAD // Home Health  [phone]  //[facility] ph [phone]/ results Lab - [phone]          Anticoagulation Care Providers       Provider Role Specialty Phone number    Pete Garnett MD [facility] Cardiology [phone]

## 2024-04-12 ENCOUNTER — ANTI-COAG VISIT (OUTPATIENT)
Dept: CARDIOLOGY | Facility: CLINIC | Age: 69
End: 2024-04-12
Payer: MEDICARE

## 2024-04-12 DIAGNOSIS — Z95.811 LVAD (LEFT VENTRICULAR ASSIST DEVICE) PRESENT: Primary | ICD-10-CM

## 2024-04-12 LAB — INR PPP: 1.6

## 2024-04-12 PROCEDURE — 93793 ANTICOAG MGMT PT WARFARIN: CPT | Mod: S$GLB,,,

## 2024-04-12 NOTE — PROGRESS NOTES
[facility]    2024 2:29 PM    Assessment/Plan:    Patient presents today with therapeutic INR.    Assessment of patient findings and chart review: no significant findings     Recommendation for patient's warfarin regimen: Continue current maintenance dose    Recommend repeat INR in 1 week  _________________________________________________________________    Roccomervin [place] (68 y.o.) is followed by the [facility].    Anticoagulation Summary  As of 2024      INR goal:  1.5-2.0   TTR:  54.4 % (3 y)   INR used for dosin.6 (2024)   Warfarin maintenance plan:  2.5 mg (5 mg x 0.5) every Mon, Wed, Fri; 5 mg (5 mg x 1) all other days   Weekly warfarin total:  27.5 mg   Plan last modified:  Cassidy Snider, PharmD (2024)   Next INR check:  2024   Target end date:      Indications    LVAD (left ventricular assist device) present [Z95.811]                 Anticoagulation Episode Summary       INR check location:      Preferred lab:      Send INR reminders to:  [facility] LVAD    Comments:  LVAD // Home Health  [phone]  //[facility] ph [phone]/ results Lab - [phone]          Anticoagulation Care Providers       Provider Role Specialty Phone number    Pete Garnett MD [facility] Cardiology [phone]

## 2024-04-18 ENCOUNTER — ANTI-COAG VISIT (OUTPATIENT)
Dept: CARDIOLOGY | Facility: CLINIC | Age: 69
End: 2024-04-18
Payer: MEDICARE

## 2024-04-18 DIAGNOSIS — Z95.811 LVAD (LEFT VENTRICULAR ASSIST DEVICE) PRESENT: Primary | ICD-10-CM

## 2024-04-18 LAB — INR PPP: 1.6

## 2024-04-18 PROCEDURE — 93793 ANTICOAG MGMT PT WARFARIN: CPT | Mod: S$GLB,,,

## 2024-04-18 NOTE — PROGRESS NOTES
[facility]    2024 8:50 AM    Assessment/Plan:    Patient presents today with therapeutic INR.    Assessment of patient findings and chart review: no significant findings     Recommendation for patient's warfarin regimen: Continue current maintenance dose    Recommend repeat INR in 1 week  _________________________________________________________________    Roccomervin [place] (68 y.o.) is followed by the [facility].    Anticoagulation Summary  As of 2024      INR goal:  1.5-2.0   TTR:  54.6% (3 y)   INR used for dosin.6 (2024)   Warfarin maintenance plan:  2.5 mg (5 mg x 0.5) every Mon, Wed, Fri; 5 mg (5 mg x 1) all other days   Weekly warfarin total:  27.5 mg   Plan last modified:  Cassidy Snider, PharmD (2024)   Next INR check:  2024   Target end date:      Indications    LVAD (left ventricular assist device) present [Z95.811]                 Anticoagulation Episode Summary       INR check location:      Preferred lab:      Send INR reminders to:  [facility] LVAD    Comments:  LVAD // Home Health  [phone]  //[facility] ph [phone]/ results Lab - [phone]          Anticoagulation Care Providers       Provider Role Specialty Phone number    Pete Garnett MD [facility] [phone]

## 2024-04-22 ENCOUNTER — PATIENT MESSAGE (OUTPATIENT)
Dept: TRANSPLANT | Facility: CLINIC | Age: 69
End: 2024-04-22
Payer: MEDICARE

## 2024-04-23 DIAGNOSIS — Z95.811 LVAD (LEFT VENTRICULAR ASSIST DEVICE) PRESENT: Primary | ICD-10-CM

## 2024-04-25 ENCOUNTER — ANTI-COAG VISIT (OUTPATIENT)
Dept: CARDIOLOGY | Facility: CLINIC | Age: 69
End: 2024-04-25
Payer: MEDICARE

## 2024-04-25 DIAGNOSIS — Z95.811 LVAD (LEFT VENTRICULAR ASSIST DEVICE) PRESENT: Primary | ICD-10-CM

## 2024-04-25 LAB — INR PPP: 2.1

## 2024-04-25 PROCEDURE — 93793 ANTICOAG MGMT PT WARFARIN: CPT | Mod: S$GLB,,,

## 2024-04-25 NOTE — PROGRESS NOTES
Patient called and was given lab result, he verified correct coumadin dose, reports had cranberry juice Monday, no other changes reported

## 2024-04-25 NOTE — PROGRESS NOTES
[facility]    2024 9:08 AM    Assessment/Plan:    Patient presents today with supratherapeutic INR.    Assessment of patient findings and chart review: no significant findings     Recommendation for patient's warfarin regimen: Lower dose today to 2.5mg then resume current maintenance dose    Recommend repeat INR Monday  _________________________________________________________________    Rocco ESTRADA Román (68 y.o.) is followed by the [facility].    Anticoagulation Summary  As of 2024      INR goal:  1.5-2.0   TTR:  54.8% (3 y)   INR used for dosin.1 (2024)   Warfarin maintenance plan:  2.5 mg (5 mg x 0.5) every Mon, Wed, Fri; 5 mg (5 mg x 1) all other days   Weekly warfarin total:  27.5 mg   Plan last modified:  Cassidy Snider, PharmD (2024)   Next INR check:  2024   Target end date:      Indications    LVAD (left ventricular assist device) present [Z95.811]                 Anticoagulation Episode Summary       INR check location:      Preferred lab:      Send INR reminders to:  [facility] COUMADIN LVAD    Comments:  LVAD // Home Health 2000 [phone]  //[facility] ph [phone]/ results Lab - [phone]          Anticoagulation Care Providers       Provider Role Specialty Phone number    Pete Garnett MD [facility] Cardiology [phone]

## 2024-04-29 ENCOUNTER — ANTI-COAG VISIT (OUTPATIENT)
Dept: CARDIOLOGY | Facility: CLINIC | Age: 69
End: 2024-04-29
Payer: MEDICARE

## 2024-04-29 DIAGNOSIS — Z95.811 LVAD (LEFT VENTRICULAR ASSIST DEVICE) PRESENT: Primary | ICD-10-CM

## 2024-04-29 LAB — INR PPP: 1.8

## 2024-04-29 PROCEDURE — 93793 ANTICOAG MGMT PT WARFARIN: CPT | Mod: S$GLB,,,

## 2024-04-29 NOTE — PROGRESS NOTES
[facility]    2024 3:47 PM    Assessment/Plan:    Patient presents today with therapeutic INR.    Assessment of patient findings and chart review: no significant findings     Recommendation for patient's warfarin regimen: Continue current maintenance dose    Recommend repeat INR Thursday  _________________________________________________________________    Rocco France (68 y.o.) is followed by the [facility].    Anticoagulation Summary  As of 2024      INR goal:  1.5-2.0   TTR:  54.8% (3.1 y)   INR used for dosin.8 (2024)   Warfarin maintenance plan:  2.5 mg (5 mg x 0.5) every Mon, Wed, Fri; 5 mg (5 mg x 1) all other days   Weekly warfarin total:  27.5 mg   Plan last modified:  Cassidy Snider, PharmD (2024)   Next INR check:  2024   Target end date:      Indications    LVAD (left ventricular assist device) present [Z95.811]                 Anticoagulation Episode Summary       INR check location:      Preferred lab:      Send INR reminders to:  [facility] COUMADIN LVAD    Comments:  LVAD // Home Health  [phone]  //[facility] ph [phone]/ results Lab - [phone]          Anticoagulation Care Providers       Provider Role Specialty Phone number    Pete Garnett MD [facility] Cardiology [phone]

## 2024-05-06 ENCOUNTER — ANTI-COAG VISIT (OUTPATIENT)
Dept: CARDIOLOGY | Facility: CLINIC | Age: 69
End: 2024-05-06
Payer: MEDICARE

## 2024-05-06 DIAGNOSIS — Z95.811 LVAD (LEFT VENTRICULAR ASSIST DEVICE) PRESENT: Primary | ICD-10-CM

## 2024-05-06 LAB — INR PPP: 1.7

## 2024-05-06 PROCEDURE — 93793 ANTICOAG MGMT PT WARFARIN: CPT | Mod: S$GLB,,,

## 2024-05-06 NOTE — PROGRESS NOTES
[facility]    2024 9:06 AM    Assessment/Plan:    Patient presents today with therapeutic INR.    Assessment of patient findings and chart review: no significant findings     Recommendation for patient's warfarin regimen: Continue current maintenance dose    Recommend repeat INR in 1 week  _________________________________________________________________    Rocco France (68 y.o.) is followed by the [employer].    Anticoagulation Summary  As of 2024      INR goal:  1.5-2.0   TTR:  55.1% (3.1 y)   INR used for dosin.7 (2024)   Warfarin maintenance plan:  2.5 mg (5 mg x 0.5) every Mon, Wed, Fri; 5 mg (5 mg x 1) all other days   Weekly warfarin total:  27.5 mg   Plan last modified:  Cassidy Snider, PharmD (2024)   Next INR check:  2024   Target end date:      Indications    LVAD (left ventricular assist device) present [Z95.811]                 Anticoagulation Episode Summary       INR check location:      Preferred lab:      Send INR reminders to:  [facility] COUMADIN LVAD    Comments:  LVAD // Home Health  [phone]  //[facility] ph [phone]/ results Lab - [phone]          Anticoagulation Care Providers       Provider Role Specialty Phone number    Pete Garnett MD [facility] Cardiology [phone]

## 2024-05-08 ENCOUNTER — PATIENT MESSAGE (OUTPATIENT)
Dept: CARDIOTHORACIC SURGERY | Facility: CLINIC | Age: 69
End: 2024-05-08
Payer: MEDICARE

## 2024-05-08 DIAGNOSIS — Z95.811 LVAD (LEFT VENTRICULAR ASSIST DEVICE) PRESENT: Primary | ICD-10-CM

## 2024-05-09 ENCOUNTER — TELEPHONE (OUTPATIENT)
Dept: TRANSPLANT | Facility: CLINIC | Age: 69
End: 2024-05-09
Payer: MEDICARE

## 2024-05-09 NOTE — TELEPHONE ENCOUNTER
NATHANAEL Moore reported that she is sending patient to local ER for 15lb weight gain in 1 week and bilateral leg swelling. Communicated information to On call provider.

## 2024-05-09 NOTE — TELEPHONE ENCOUNTER
Message received to call hh nurse about 8 lb wt gain.  Called home health, spoke with Teresa. She said she already talked with Valentin and pt is in ER

## 2024-05-14 ENCOUNTER — ANTI-COAG VISIT (OUTPATIENT)
Dept: CARDIOLOGY | Facility: CLINIC | Age: 69
End: 2024-05-14
Payer: MEDICARE

## 2024-05-14 DIAGNOSIS — Z95.811 LVAD (LEFT VENTRICULAR ASSIST DEVICE) PRESENT: Primary | ICD-10-CM

## 2024-05-14 LAB — INR PPP: 2

## 2024-05-14 PROCEDURE — 93793 ANTICOAG MGMT PT WARFARIN: CPT | Mod: S$GLB,,,

## 2024-05-14 NOTE — PROGRESS NOTES
[facility]    2024 2:35 PM    Assessment/Plan:    Patient presents today with therapeutic INR.    Assessment of patient findings and chart review: no significant findings     Recommendation for patient's warfarin regimen: Continue current maintenance dose    Recommend repeat INR in 1 week  _________________________________________________________________    Rocco France (68 y.o.) is followed by the [employer] [facility].    Anticoagulation Summary  As of 2024      INR goal:  1.5-2.0   TTR:  55.4% (3.1 y)   INR used for dosin.0 (2024)   Warfarin maintenance plan:  2.5 mg (5 mg x 0.5) every Mon, Wed, Fri; 5 mg (5 mg x 1) all other days   Weekly warfarin total:  27.5 mg   Plan last modified:  Cassidy Snider, PharmD (2024)   Next INR check:  2024   Target end date:      Indications    LVAD (left ventricular assist device) present [Z95.811]                 Anticoagulation Episode Summary       INR check location:      Preferred lab:      Send INR reminders to:  [facility] COUMADIN LVAD    Comments:  LVAD // Home Health  [phone]  //[facility] ph [phone]/ results Lab - [phone]          Anticoagulation Care Providers       Provider Role Specialty Phone number    Pete Garnett MD [facility] Cardiology [phone]

## 2024-05-22 ENCOUNTER — ANTI-COAG VISIT (OUTPATIENT)
Dept: CARDIOLOGY | Facility: CLINIC | Age: 69
End: 2024-05-22
Payer: MEDICARE

## 2024-05-22 DIAGNOSIS — Z95.811 LVAD (LEFT VENTRICULAR ASSIST DEVICE) PRESENT: Primary | ICD-10-CM

## 2024-05-22 LAB — INR PPP: 2.6

## 2024-05-22 PROCEDURE — 93793 ANTICOAG MGMT PT WARFARIN: CPT | Mod: S$GLB,,,

## 2024-05-22 NOTE — PROGRESS NOTES
05/22/2024-Sera at [facility] Lab stated pt didn't come in for INR 5/21/24. Spoke to pt and he stated he will go to lab today for INR.

## 2024-05-22 NOTE — PROGRESS NOTES
Patient reports correct Warfarin dose, cranberries 5/20/24, edema in left knee since last week(stated LVAD Team aware), no other changes reported.

## 2024-05-22 NOTE — PROGRESS NOTES
[facility]    2024 1:24 PM    Assessment/Plan:    Patient presents today with supratherapeutic INR.    Assessment of patient findings and chart review: reports swelling in ankle    Recommendation for patient's warfarin regimen: Hold dose today then resume current maintenance dose    Recommend repeat INR Monday  _________________________________________________________________    Rocco ESTRADA Román (68 y.o.) is followed by the [email or web address] [facility].    Anticoagulation Summary  As of 2024      INR goal:  1.5-2.0   TTR:  55.0% (3.1 y)   INR used for dosin.6 (2024)   Warfarin maintenance plan:  2.5 mg (5 mg x 0.5) every Mon, Wed, Fri; 5 mg (5 mg x 1) all other days   Weekly warfarin total:  27.5 mg   Plan last modified:  Cassidy Snider, PharmD (2024)   Next INR check:  2024   Target end date:      Indications    LVAD (left ventricular assist device) present [Z95.811]                 Anticoagulation Episode Summary       INR check location:      Preferred lab:      Send INR reminders to:  [facility] COUMADIN LVAD    Comments:  LVAD // Home Health 2000 [phone]  //[facility] ph [phone]/ results Lab - [phone]          Anticoagulation Care Providers       Provider Role Specialty Phone number    Pete Garnett MD [facility] Cardiology [phone]

## 2024-05-24 RX ORDER — DIGOXIN 125 MCG
0.12 TABLET ORAL
Qty: 30 TABLET | Refills: 0 | OUTPATIENT
Start: 2024-05-24

## 2024-05-24 RX ORDER — DIGOXIN 125 MCG
0.12 TABLET ORAL DAILY
Qty: 30 TABLET | Refills: 11 | Status: SHIPPED | OUTPATIENT
Start: 2024-05-24

## 2024-05-28 NOTE — PROGRESS NOTES
05/28/2024-Felicia at [facility] Lab stated pt didn't come in for INR 5/27/24. Pt stated he was busy 5/27/2024 and couldn't go for INR, missed 5/27/2024 Warfarin 2.5mg dose and will go for INR today.

## 2024-05-29 ENCOUNTER — ANTI-COAG VISIT (OUTPATIENT)
Dept: CARDIOLOGY | Facility: CLINIC | Age: 69
End: 2024-05-29
Payer: MEDICARE

## 2024-05-29 ENCOUNTER — PATIENT MESSAGE (OUTPATIENT)
Dept: TRANSPLANT | Facility: CLINIC | Age: 69
End: 2024-05-29
Payer: MEDICARE

## 2024-05-29 DIAGNOSIS — Z95.811 LVAD (LEFT VENTRICULAR ASSIST DEVICE) PRESENT: Primary | ICD-10-CM

## 2024-05-29 LAB — INR PPP: 2.4

## 2024-05-29 PROCEDURE — 93793 ANTICOAG MGMT PT WARFARIN: CPT | Mod: S$GLB,,,

## 2024-05-29 NOTE — PROGRESS NOTES
Patient reports missing 5/27/2024 Warfarin 2.5mg dose, took correct Warfarin dose all other days, no other changes reported.        51

## 2024-05-29 NOTE — PROGRESS NOTES
[facility]    2024 10:12 AM    Assessment/Plan:    Patient presents today with supratherapeutic INR.    Assessment of patient findings and chart review: Missed dose on Monday    Recommendation for patient's warfarin regimen: Hold dose today then decrease maintenance dose    Recommend repeat INR Friday  _________________________________________________________________    Rocco ESTRADA Román (68 y.o.) is followed by the [employer] [facility].    Anticoagulation Summary  As of 2024      INR goal:  1.5-2.0   TTR:  54.8% (3.1 y)   INR used for dosin.4 (2024)   Warfarin maintenance plan:  5 mg (5 mg x 1) every Tue, Thu, Sat; 2.5 mg (5 mg x 0.5) all other days   Weekly warfarin total:  25 mg   Plan last modified:  Cheryl Barahona, PharmD (2024)   Next INR check:  2024   Target end date:      Indications    LVAD (left ventricular assist device) present [Z95.811]                 Anticoagulation Episode Summary       INR check location:      Preferred lab:      Send INR reminders to:  [facility] COUMADIN LVAD    Comments:  LVAD // Home Health 2000 [phone]  //[facility] ph [phone]/ results Lab - [phone]          Anticoagulation Care Providers       Provider Role Specialty Phone number    Pete Garnett MD [facility] Cardiology [phone]

## 2024-05-30 ENCOUNTER — ANTI-COAG VISIT (OUTPATIENT)
Dept: CARDIOLOGY | Facility: CLINIC | Age: 69
End: 2024-05-30
Payer: MEDICARE

## 2024-05-30 DIAGNOSIS — Z95.811 LVAD (LEFT VENTRICULAR ASSIST DEVICE) PRESENT: Primary | ICD-10-CM

## 2024-05-30 LAB — INR PPP: 1.9

## 2024-05-30 PROCEDURE — 93793 ANTICOAG MGMT PT WARFARIN: CPT | Mod: S$GLB,,,

## 2024-06-05 ENCOUNTER — ANTI-COAG VISIT (OUTPATIENT)
Dept: CARDIOLOGY | Facility: CLINIC | Age: 69
End: 2024-06-05
Payer: MEDICARE

## 2024-06-05 DIAGNOSIS — Z95.811 LVAD (LEFT VENTRICULAR ASSIST DEVICE) PRESENT: Primary | ICD-10-CM

## 2024-06-05 LAB — INR PPP: 1.7

## 2024-06-05 PROCEDURE — 93793 ANTICOAG MGMT PT WARFARIN: CPT | Mod: S$GLB,,,

## 2024-06-05 NOTE — PROGRESS NOTES
[facility]    2024 3:56 PM    Assessment/Plan:    Patient presents today with therapeutic INR.    Assessment of patient findings and chart review: no significant findings     Recommendation for patient's warfarin regimen: Continue current maintenance dose    Recommend repeat INR in 1 week  _________________________________________________________________    Rocco NATALIE France (68 y.o.) is followed by the [facility] [employer] [facility].    Anticoagulation Summary  As of 2024      INR goal:  1.5-2.0   TTR:  54.9% (3.2 y)   INR used for dosin.7 (2024)   Warfarin maintenance plan:  5 mg (5 mg x 1) every Tue, Thu, Sat; 2.5 mg (5 mg x 0.5) all other days   Weekly warfarin total:  25 mg   Plan last modified:  Cheryl Barahona, PharmD (2024)   Next INR check:  2024   Target end date:      Indications    LVAD (left ventricular assist device) present [Z95.811]                 Anticoagulation Episode Summary       INR check location:      Preferred lab:      Send INR reminders to:  [facility] COUMADIN LVAD    Comments:  LVAD // Home Health  [phone]  //[facility] ph [phone]/ results Lab - [phone]          Anticoagulation Care Providers       Provider Role Specialty Phone number    Pete Garnett MD [facility] Cardiology [phone]

## 2024-06-05 NOTE — PROGRESS NOTES
06/05/2024-Sera at [facility] Lab stated pt didn't come in for INR on 6/4/24. Pt stated he will go to lab today for INR.     
[facility]    05/30/2024    Rocco France (68 y.o.) is followed by the [facility].      Assessment/Plan:    Rocco France presents with a therapeutic INR. Goal INR: 1.5-2.0    Lab Results   Component Value Date    INR 1.9 05/30/2024    INR 2.4 05/28/2024    INR 2.6 05/22/2024       Assessment of patient findings per MA/LPN and chart review:   The following significant findings were found:   None     Recommendation for patient's warfarin regimen:   No change was made to warfarin therapy during this visit and patient has been instructed to continue their current warfarin regimen.    Recommended repeat INR in 5 days      Cassidy Agatha, PharmD, BCPS  Clinical Pharmacist - [facility]  Preferred Contact: Secure Messaging or In Basket Message      
Detail Level: Zone

## 2024-06-06 ENCOUNTER — TELEPHONE (OUTPATIENT)
Dept: TRANSPLANT | Facility: CLINIC | Age: 69
End: 2024-06-06
Payer: MEDICARE

## 2024-06-06 NOTE — TELEPHONE ENCOUNTER
Spoke with patient regarding equipment maintenance due at upcoming clinic appointment on 6/12/24.  Asked patient to bring MPU and [facility] with them to next appointment for maintenance.  Verbalized understanding and agreement.    It is medically necessary to ensure patient has properly functioning equipment and wearables to prevent infection, injury or death to patient.

## 2024-06-12 ENCOUNTER — DOCUMENTATION ONLY (OUTPATIENT)
Dept: CARDIOTHORACIC SURGERY | Facility: CLINIC | Age: 69
End: 2024-06-12
Payer: MEDICARE

## 2024-06-12 ENCOUNTER — CLINICAL SUPPORT (OUTPATIENT)
Dept: TRANSPLANT | Facility: CLINIC | Age: 69
End: 2024-06-12
Payer: MEDICARE

## 2024-06-12 ENCOUNTER — HOSPITAL ENCOUNTER (OUTPATIENT)
Dept: PULMONOLOGY | Facility: CLINIC | Age: 69
Discharge: HOME | End: 2024-06-12
Payer: MEDICARE

## 2024-06-12 ENCOUNTER — HOSPITAL ENCOUNTER (OUTPATIENT)
Dept: CARDIOLOGY | Facility: HOSPITAL | Age: 69
Discharge: HOME | End: 2024-06-12
Attending: INTERNAL MEDICINE
Payer: MEDICARE

## 2024-06-12 ENCOUNTER — ANTI-COAG VISIT (OUTPATIENT)
Dept: CARDIOLOGY | Facility: CLINIC | Age: 69
End: 2024-06-12
Payer: MEDICARE

## 2024-06-12 ENCOUNTER — OFFICE VISIT (OUTPATIENT)
Dept: TRANSPLANT | Facility: CLINIC | Age: 69
End: 2024-06-12
Payer: MEDICARE

## 2024-06-12 VITALS — WEIGHT: 192 LBS | HEIGHT: 74 IN | SYSTOLIC BLOOD PRESSURE: 92 MMHG | BODY MASS INDEX: 24.64 KG/M2

## 2024-06-12 VITALS — WEIGHT: 190 LBS | HEIGHT: 74 IN | BODY MASS INDEX: 24.38 KG/M2

## 2024-06-12 VITALS — HEIGHT: 72 IN | BODY MASS INDEX: 23.7 KG/M2 | WEIGHT: 175 LBS

## 2024-06-12 DIAGNOSIS — Z95.811 LVAD (LEFT VENTRICULAR ASSIST DEVICE) PRESENT: ICD-10-CM

## 2024-06-12 DIAGNOSIS — I25.10 CORONARY ARTERY DISEASE DUE TO LIPID RICH PLAQUE: ICD-10-CM

## 2024-06-12 DIAGNOSIS — E78.00 PURE HYPERCHOLESTEROLEMIA: ICD-10-CM

## 2024-06-12 DIAGNOSIS — I48.0 PAROXYSMAL ATRIAL FIBRILLATION: ICD-10-CM

## 2024-06-12 DIAGNOSIS — I10 ESSENTIAL HYPERTENSION: ICD-10-CM

## 2024-06-12 DIAGNOSIS — I50.42 CHRONIC COMBINED SYSTOLIC AND DIASTOLIC HEART FAILURE: ICD-10-CM

## 2024-06-12 DIAGNOSIS — I50.43 ACUTE ON CHRONIC COMBINED SYSTOLIC AND DIASTOLIC HEART FAILURE: ICD-10-CM

## 2024-06-12 DIAGNOSIS — Z95.811 LVAD (LEFT VENTRICULAR ASSIST DEVICE) PRESENT: Primary | ICD-10-CM

## 2024-06-12 DIAGNOSIS — Z95.811 HEART REPLACED BY HEART ASSIST DEVICE: Primary | ICD-10-CM

## 2024-06-12 DIAGNOSIS — I42.8 NICM (NONISCHEMIC CARDIOMYOPATHY): ICD-10-CM

## 2024-06-12 DIAGNOSIS — Z95.810 ICD (IMPLANTABLE CARDIOVERTER-DEFIBRILLATOR) IN PLACE: ICD-10-CM

## 2024-06-12 DIAGNOSIS — I25.83 CORONARY ARTERY DISEASE DUE TO LIPID RICH PLAQUE: ICD-10-CM

## 2024-06-12 LAB
ASCENDING AORTA: 3.79 CM
BSA FOR ECHO PROCEDURE: 2.01 M2
CV ECHO LV RWT: 0.21 CM
DOP CALC LVOT AREA: 4.8 CM2
DOP CALC LVOT DIAMETER: 2.48 CM
E/E' RATIO: 8.35 M/S
ECHO LV POSTERIOR WALL: 0.74 CM (ref 0.6–1.1)
FRACTIONAL SHORTENING: 10 % (ref 28–44)
INTERVENTRICULAR SEPTUM: 0.62 CM (ref 0.6–1.1)
LA MAJOR: 5 CM
LA MINOR: 5 CM
LA WIDTH: 4.81 CM
LEFT ATRIUM SIZE: 4.8 CM
LEFT ATRIUM VOLUME INDEX MOD: 57.7 ML/M2
LEFT ATRIUM VOLUME INDEX: 48.8 ML/M2
LEFT ATRIUM VOLUME MOD: 116.06 CM3
LEFT ATRIUM VOLUME: 98.12 CM3
LEFT INTERNAL DIMENSION IN SYSTOLE: 6.38 CM (ref 2.1–4)
LEFT VENTRICLE DIASTOLIC VOLUME INDEX: 119.09 ML/M2
LEFT VENTRICLE DIASTOLIC VOLUME: 239.38 ML
LEFT VENTRICLE MASS INDEX: 103 G/M2
LEFT VENTRICLE SYSTOLIC VOLUME INDEX: 103.2 ML/M2
LEFT VENTRICLE SYSTOLIC VOLUME: 207.38 ML
LEFT VENTRICULAR INTERNAL DIMENSION IN DIASTOLE: 7.1 CM (ref 3.5–6)
LEFT VENTRICULAR MASS: 206.59 G
LV LATERAL E/E' RATIO: 6.45 M/S
LV SEPTAL E/E' RATIO: 11.83 M/S
LVAD BASE RATE: 5200 RPM
MV PEAK E VEL: 0.71 M/S
OHS CV RV/LV RATIO: 0.71 CM
PISA TR MAX VEL: 2.24 M/S
RA MAJOR: 5.09 CM
RA PRESSURE ESTIMATED: 15 MMHG
RA WIDTH: 5.12 CM
RIGHT VENTRICULAR END-DIASTOLIC DIMENSION: 5.03 CM
RV TB RVSP: 17 MMHG
SINUS: 3.87 CM
STJ: 3.22 CM
TDI LATERAL: 0.11 M/S
TDI SEPTAL: 0.06 M/S
TDI: 0.09 M/S
TR MAX PG: 20 MMHG
TRICUSPID ANNULAR PLANE SYSTOLIC EXCURSION: 1.05 CM
TV REST PULMONARY ARTERY PRESSURE: 35 MMHG
Z-SCORE OF LEFT VENTRICULAR DIMENSION IN END DIASTOLE: 1.87
Z-SCORE OF LEFT VENTRICULAR DIMENSION IN END SYSTOLE: 4.49

## 2024-06-12 PROCEDURE — 94618 PULMONARY STRESS TESTING: CPT | Mod: S$GLB,,, | Performed by: INTERNAL MEDICINE

## 2024-06-12 PROCEDURE — 93306 TTE W/DOPPLER COMPLETE: CPT | Mod: 26,,, | Performed by: INTERNAL MEDICINE

## 2024-06-12 PROCEDURE — 93750 INTERROGATION VAD IN PERSON: CPT | Mod: S$GLB,,, | Performed by: INTERNAL MEDICINE

## 2024-06-12 PROCEDURE — 93306 TTE W/DOPPLER COMPLETE: CPT

## 2024-06-12 PROCEDURE — 99999 PR PBB SHADOW E&M-EST. PATIENT-LVL III: CPT | Mod: PBBFAC,,, | Performed by: INTERNAL MEDICINE

## 2024-06-12 RX ORDER — FUROSEMIDE 10 MG/ML
80 INJECTION INTRAMUSCULAR; INTRAVENOUS
Status: COMPLETED | OUTPATIENT
Start: 2024-06-12 | End: 2024-06-12

## 2024-06-12 RX ORDER — TORSEMIDE 20 MG/1
20 TABLET ORAL 2 TIMES DAILY
Qty: 60 TABLET | Refills: 11 | Status: SHIPPED | OUTPATIENT
Start: 2024-06-12 | End: 2025-06-12

## 2024-06-12 RX ORDER — SPIRONOLACTONE 25 MG/1
25 TABLET ORAL DAILY
Qty: 30 TABLET | Refills: 11 | Status: SHIPPED | OUTPATIENT
Start: 2024-06-12 | End: 2025-06-12

## 2024-06-12 RX ADMIN — FUROSEMIDE 80 MG: 10 INJECTION INTRAMUSCULAR; INTRAVENOUS at 12:06

## 2024-06-12 NOTE — PROGRESS NOTES
[facility] Transplantation [facility] Follow up.        Attending Physician: Pete Baker MD.  The patient's last visit with me was on 9/14/2022.         HPI.  67 yo BM with stage D CHF due to non-ischemic cardiomyopathy status post DT HM3 implantation 1/13/2021 with sternal closure 1/14/2021 with unremarkable post-operative course.  He also has PAF, HTN, HLD, DM2.     He was admitted for syncope 1/27/2022 at which time he was found to have a evolving right cerebral acute subdural hematoma and acute basal cistern subarachnoid hemorrhage. He requiring intubation with prolonged hospitalization and was incidentally found to be positive for COVID-19 and treated with remdesivir. He was taken off of aspirin and discharged home 2/17/2022. He was also admitted 3/2022 for 3 days due to dysequilibrium, diplopia and repeat CT head was stable. Neurology recommended myasthenia gravis workup which was negative.      He has done well since from an LVAD standpoint but last weekend I [facility]  [facility] had been dealing with drug addiction (crack/cocaine) and was seeking help and requested to be admitted to an inpatient rehab. We explained to him that with his LVAD only outpatient rehab was an option.         June 12, 2024:  Today he comes feeling poorly. Severe edema up to the abdominal wall, testicles. +GARCÍA, orthopnea, PND, 20 pound weight gain over usual weight.        Review of Systems   Constitutional:  Positive for activity change, appetite change and fatigue. Negative for chills, diaphoresis and fever.   HENT:  Negative for nasal congestion, rhinorrhea and sore throat.    Eyes:  Negative for visual disturbance.   Respiratory:  Positive for cough and shortness of breath. Negative for choking and chest tightness.    Cardiovascular:  Positive for palpitations and leg swelling. Negative for  chest pain.   Gastrointestinal:  Positive for abdominal distention and constipation. Negative for abdominal pain, diarrhea, nausea and vomiting.   Genitourinary:  Negative for difficulty urinating, dysuria and hematuria.   Integumentary:  Negative for rash.   Neurological:  Negative for seizures, syncope and light-headedness.   Psychiatric/Behavioral:  Negative for agitation and hallucinations.         Past Medical History:   Diagnosis Date    Acute respiratory failure requiring reintubation 1/28/2022    CLARISSE (acute kidney injury) 1/11/2021    Diabetes mellitus     Kidney stones         Past Surgical History:   Procedure Laterality Date    CARDIAC CATHETERIZATION  2010    no stents    CARDIAC DEFIBRILLATOR PLACEMENT  2010    CARDIAC DEFIBRILLATOR PLACEMENT      HERNIA REPAIR      IRRIGATION OF MEDIASTINUM N/A 1/14/2021    Procedure: IRRIGATION, MEDIASTINUM;  Surgeon: Zenon Corona MD;  Location: [facility] OR [address] [place] [address];  Service: Cardiovascular;  Laterality: N/A;    KNEE ARTHROSCOPY Right     LEFT VENTRICULAR ASSIST DEVICE Left 1/13/2021    Procedure: INSERTION- HeartMate 3 LEFT VENTRICULAR ASSIST DEVICE ;  Surgeon: Zenon Corona MD;  Location: [facility] OR [address] [place] [address];  Service: Cardiovascular;  Laterality: Left;    RECONSTRUCTION OF PERICARDIUM N/A 1/14/2021    Procedure: RECONSTRUCTION, PERICARDIUM;  Surgeon: Zenon Corona MD;  Location: [facility] OR [address] [place] [address];  Service: Cardiovascular;  Laterality: N/A;    RIGHT HEART CATHETERIZATION Right 11/2/2020    Procedure: INSERTION, CATHETER, RIGHT HEART;  Surgeon: Deana Lake MD;  Location: [facility] CATH LAB;  Service: Cardiology;  Laterality: Right;    RIGHT HEART CATHETERIZATION Right 3/24/2022    Procedure: INSERTION, CATHETER, RIGHT HEART;  Surgeon: Manuel Ramos Jr., MD;  Location: [facility] CATH LAB;  Service: Cardiology;  Laterality: Right;    STERNAL WOUND CLOSURE  1/13/2021    Procedure: TEMPORARY CLOSURE OF CHEST;  Surgeon: Zenon Corona MD;  Location: [facility] OR [address] [place] [address];  Service:  "Cardiovascular;;    STERNAL WOUND CLOSURE N/A 1/14/2021    Procedure: CLOSURE, WOUND, STERNUM;  Surgeon: Zenon Corona MD;  Location: [facility] OR [address] [place] [address];  Service: Cardiovascular;  Laterality: N/A;        Family History   Problem Relation Name Age of Onset    Heart attack Mother      Heart failure Brother      Heart attack Brother      Hypertension Brother          Review of patient's allergies indicates:   Allergen Reactions    Pcn [penicillins] Hives        Current Outpatient Medications   Medication Instructions    amLODIPine (NORVASC) 10 mg, Oral, Daily    atorvastatin (LIPITOR) 80 mg, Oral, Daily    digoxin (LANOXIN) 0.125 mg, Oral, Daily    docusate sodium (COLACE) 100 mg, Oral, Daily    ferrous gluconate 324 mg, Oral, With breakfast    furosemide (LASIX) 40 mg, Oral, Daily    magnesium oxide (MAG-OX) 400 mg, Oral, 3 times daily    mirtazapine (REMERON) 30 mg, Oral, Nightly    warfarin (COUMADIN) 2.5-5 mg, Oral, Daily, As directed by the [facility]        Vitals:    06/12/24 0905   BP: (!) 92/0        Wt Readings from Last 3 Encounters:   06/12/24 87.1 kg (192 lb)   06/12/24 79.4 kg (175 lb)   03/07/24 79.4 kg (175 lb)     Temp Readings from Last 3 Encounters:   03/07/24 97.8 °F (36.6 °C) (Oral)   12/07/23 97.9 °F (36.6 °C) (Oral)   10/09/23 98.1 °F (36.7 °C) (Oral)     BP Readings from Last 3 Encounters:   06/12/24 (!) 92/0   03/07/24 (!) 92/0   12/07/23 (!) 88/0     Pulse Readings from Last 3 Encounters:   03/07/24 82   05/23/23 79   05/18/23 85        Body mass index is 24.65 kg/m². Estimated body surface area is 2.13 meters squared as calculated from the following:    Height as of this encounter: 6' 2" (1.88 m).    Weight as of this encounter: 87.1 kg (192 lb).     Physical Exam  Constitutional:       Appearance: He is well-developed.   HENT:      Head: Normocephalic and atraumatic.      Right Ear: External ear normal.      Left Ear: External ear normal.   Eyes:      Conjunctiva/sclera: Conjunctivae " normal.      Pupils: Pupils are equal, round, and reactive to light.   Neck:      Vascular: Hepatojugular reflux and JVD present.      Comments: JVP 18 cmH20  Cardiovascular:      Rate and Rhythm: Normal rate and regular rhythm.      Pulses: Intact distal pulses.      Heart sounds: No murmur heard.     No friction rub. No gallop.      Comments: Normal LVAD hum  Pulmonary:      Effort: Pulmonary effort is normal.      Breath sounds: Normal breath sounds.   Abdominal:      General: Bowel sounds are normal. There is no distension.      Palpations: Abdomen is soft.      Tenderness: There is no abdominal tenderness. There is no guarding or rebound.   Musculoskeletal:      Cervical back: Normal range of motion and neck supple.      Right lower le+ Edema present.      Left lower le+ Edema present.   Neurological:      Mental Status: He is alert and oriented to person, place, and time.          Lab Results   Component Value Date    BNP 1,039 (H) 2024     2024    K 4.4 2024    MG 1.9 2024     2024    CO2 24 2024    BUN 15 2024    CREATININE 1.2 2024    GLU 77 2024    HGBA1C 6.6 (H) 2023    AST 17 2024    ALT 7 (L) 2024    ALBUMIN 3.0 (L) 2024    PROT 8.4 2024    BILITOT 2.9 (H) 2024    WBC 7.60 2024    HGB 11.6 (L) 2024    HCT 36.6 (L) 2024    HCT 30 (L) 2022     (L) 2024    INR 1.9 (H) 2024    INR 1.7 2024     2024    TSH 0.906 2023    CHOL 106 (L) 2024    HDL 38 (L) 2024    LDLCALC 55.4 (L) 2024    TRIG 63 2024         Results for orders placed during the hospital encounter of 23    Echo    Interpretation Summary  · There is an LVAD present. Base speed is 5200 RPMs. The pump type is a Heartmate III. The interventricular septum appears midline. The aortic valve opens with each cardiac cycle.  · The estimated ejection  fraction is 10%. LVEDD 6.6 cm.  · The left ventricle is moderately enlarged with severely decreased systolic function.  · Grade I left ventricular diastolic dysfunction.  · Mild right ventricular enlargement with normal right ventricular systolic function.  · Moderate left atrial enlargement.  · Moderate right atrial enlargement.  · Mild mitral regurgitation.  · Mild tricuspid regurgitation.  · Mild pulmonic regurgitation.  · The estimated PA systolic pressure is 30 mmHg.  · Normal central venous pressure (3 mmHg).        Results for orders placed during the hospital encounter of 03/22/22    Cardiac catheterization    Conclusion  · The estimated blood loss was <50 mL.    HM 3 @ 5100:  Right atrial pressure is mildly elevated.  Pulmonary capillary wedge pressure is mildly elevated.  Pulmonary artery pressure is normal.  Pulmonary vascular resistance is normal.  Systemic vascular resistance is 749.  Monse cardiac output and index is normal.    I certify that I was present for catheter insertion, catheter manipulation and hemodynamic interpretation of this patient.    The procedure log was documented by No documenter listed and verified by Manuel Ramos Jr, MD.    Date: 3/24/2022  Time: 5:34 PM         Assessment and Plan:  Heart replaced by heart assist device  -     LVAD Interrogations Out Patient Only  -     LVAD Maintenance  -     Comprehensive Metabolic Panel; Future; Expected date: 06/17/2024  -     BNP; Future; Expected date: 06/17/2024    LVAD (left ventricular assist device) present    ICD (implantable cardioverter-defibrillator) in place    Pure hypercholesterolemia    Essential hypertension    Coronary artery disease due to lipid rich plaque    Paroxysmal atrial fibrillation    NICM (nonischemic cardiomyopathy)    Acute on chronic combined systolic and diastolic heart failure    Other orders  -     torsemide (DEMADEX) 20 MG Tab; Take 1 tablet (20 mg total) by mouth 2 (two) times a day.  Dispense: 60  tablet; Refill: 11  -     spironolactone (ALDACTONE) 25 MG tablet; Take 1 tablet (25 mg total) by mouth once daily.  Dispense: 30 tablet; Refill: 11          Acute on chronic cardiomyopathy and systolic HF, NYHA class IV, stage D, s/p LVAD.  Hemodynamic status: warm, hypertensive, severely hypervolemic.  I personally reviewed echo images.     Antithrombotic therapy and target anticoagulation: INR/Prothrombin Time 1.9. Adjustment to warfarin dose per anticoagulation clinic.        LVAD related complications:   -Bleeding (gastrointestinal, epistaxis, etc): none, HGB stable at 11.6.  -RV failure:  none. Severe global hypervolemia. No LFA, hypotension and warm on exam.  -Thrombotic events and LDH:  none, ld 248.   -DLES and Infection: 1, none.    Plan:  -I offered admission to hospital for expedite treatment of his severe hypervolemia and NYHA class IV symptoms. He declined. He wanted to try change in oral regimen instead. He understood if he does not respond to interventions or at any time he changes his mind, he should come to hospital for admission and decongestion with IV diuretics.  -Stop lasix 40 mg daily  -Start Torsemide 20 mg twice a day.  -Start spironolactone 25 mg daily.  -Follow up blood test morning am.  -patient was asked to call LVAD coordinator Monday afternoon to report  symptoms, weight, and to discuss results/plan.    He will be given one dose of lasix 80 mg IV in clinic.      Interrogation of Ventricular assist device was performed with physician analysis of device parameters and review of device function. I have personally reviewed the interrogation findings and agree with findings as stated.      Additionally, the patient has a patient centered goal of being able to improve their quality of life .

## 2024-06-12 NOTE — LETTER
June 12, 2024        Teresa Mendoza  [address]  [address]  [place] [address]  Phone: [phone]  Fax: [phone]             [facility]  [address]  [place] [address]  Phone: [phone]   Patient: Rocco France   MR Number: [id number]   YOB: 1955   Date of Visit: 6/12/2024       Dear Dr. Teresa Mendoza    Thank you for referring Rocco France to me for evaluation. Attached you will find relevant portions of my assessment and plan of care.    If you have questions, please do not hesitate to call me. I look forward to following Rocco France along with you.    Sincerely,    Pete Baker MD    Enclosure    If you would like to receive this communication electronically, please contact [email or web address] or [phone] to request youmag Link access.    [employer]mag Link is a tool which provides read-only access to select patient information with whom you have a relationship. Its easy to use and provides real time access to review your patients record including encounter summaries, notes, results, and demographic information.    If you feel you have received this communication in error or would no longer like to receive these types of communications, please e-mail [email or web address]

## 2024-06-12 NOTE — PROGRESS NOTES
Pt presented for 42 month follow-up and verbalized consent and willingness to continue to participate with the INTERM[facility] registry.      Patient completed the EQ-5D quality of life questionnaire, KCCQ, and the Trail Making neurocognitive test in 119 seconds.

## 2024-06-12 NOTE — PROCEDURES
6/12/2024     9:35 AM 3/7/2024     9:32 AM 12/7/2023    11:13 AM 10/4/2023     9:38 AM 7/6/2023     9:06 AM 5/23/2023     4:28 AM 5/22/2023    11:52 PM   TXP JACEY INTERROGATIONS   Type HeartMate3 HeartMate3 HeartMate3 HeartMate3 HeartMate3     Flow 3.6 4.3 4.5 4 3.7     Speed 5200 5200 5200 5200 5200     PI 7.6 6.4 5.7 8.3 8.9     Power (Edwards) 3.5 3.8 3.8 3.7 3.6     LSL 4800 4800 4800 4800 4800     Pulsatility No Pulse Pulse Pulse Pulse Intermittent pulse Pulse Pulse   }

## 2024-06-12 NOTE — LETTER
Date Sent:  06/12/2024      Rocco ESTRADA Román  1955      This patient will require the following lab(s). The patient will be instructed to report to your facility for their lab to be drawn.  Please fax all results to [phone]. INR results to be faxed to [phone]    LABS ORDERED   ICD 10    DATE REQUESTED:  Complete Metabolic Panel                ICD Z95.811                                        STAT 6/19/24   BNP Level                                        ICD Z95.811 / I50.9                             STAT 6/19/24          Zaida Rnener, BRODIEN, RN, CCRN, VAD-C [phone]   BRODIE ThomasN, RN, [phone]   Mariela Barrow, MSN, RN, [phone]   Kacey Barr, [phone]       Fax all results to [phone].  Fax INR results to [phone].  Sincerely,      Deana Lake M.D.  Medical Director, Mechanical Circulatory Device Support Program  Section of Cardiomyopathy & Heart Transplantation

## 2024-06-12 NOTE — PROGRESS NOTES
[facility]    2024 8:47 AM    Assessment/Plan:    Patient presents today with therapeutic INR.    Assessment of patient findings and chart review: no significant findings     Recommendation for patient's warfarin regimen: Continue current maintenance dose    Recommend repeat INR in 1 week  _________________________________________________________________    Rocco NATALIE France (68 y.o.) is followed by the [facility].    Anticoagulation Summary  As of 2024      INR goal:  1.5-2.0   TTR:  55.2% (3.2 y)   INR used for dosin.9 (2024)   Warfarin maintenance plan:  5 mg (5 mg x 1) every Tue, Thu, Sat; 2.5 mg (5 mg x 0.5) all other days   Weekly warfarin total:  25 mg   Plan last modified:  Cheryl Barahona, PharmD (2024)   Next INR check:  2024   Target end date:      Indications    LVAD (left ventricular assist device) present [Z95.811]                 Anticoagulation Episode Summary       INR check location:      Preferred lab:      Send INR reminders to:  [facility] COUMADIN LVAD    Comments:  LVAD // Home Health  [phone]  //[facility] ph [phone] [phone]/ results Lab - [phone]          Anticoagulation Care Providers       Provider Role Specialty Phone number    Pete Garnett MD [facility] Cardiology [phone]

## 2024-06-12 NOTE — PROGRESS NOTES
Equipment:  Any Equipment Needs: Routine Maintenance    Emergency Bag  Emergency Equipment With Patient: Yes   Condition of emergency bag: CDI  Contents of emergency bag: Backup controller, 2 fully charged batteries, 2 battery clips, reference cards    Equipment Inspection  Inspected patient's equipment today: Yes  Equipment clean and free of debris, including locking mechanism: No  Cleaned equipment today and educated patient on how to do this: Yes    Mobile Power Unit  Mobile power unit serial number:[id number]  MPU maintenance due: 12/2024  MPU maintenance completed today:  Yes  Mobile Power Unit 6 month maintenance and AA battery replacement complete. Power on test completed and passed. MPU, MPU patient cable and AC power cord inspected for damage and noted to be in good condition. Equipment cleaned.    Universal Battery Charger  UBC serial number: [id number]  UBC maintenance due:6/2025  UBC maintenance completed today Yes  UBC yearly maintenance complete. UBC and power cable inspected for damage and noted to be in good condition. Power on test and  slot test completed and passed. Equipment cleaned.    Primary Controller and Emergency Backup Battery   Primary Controller: [id number]; EBB S/N: [id number], MFG Date: 11/10/2022; Replace Due: 11/2025    Backup Controller and Emergency Backup Battery  : Back up Controller: [id number]; EBB S/N: [id number], MFG Date: 11/10/2022; Replace Due: 11/2025  EBB due to be charged: 12/2024  EBB charged today and self test completed: Yes  Self test passed:  Yes    Equipment Needs  Equipment issued to patient today in clinic: Yes Vest  Discussed with MCS Coordinator and physician: Yes  Items Under Warranty No VAD Coordinator Notified Yes  Equipment loaned to patient today: No   Waveforms sent: No   It is medically necessary to ensure patient has properly functioning equipment and wearables to prevent infection, injury or death to patient.

## 2024-06-12 NOTE — PATIENT INSTRUCTIONS
You have extra fluid on you.  Please adhere to a low sodium diet (no more than 1.5 grams of sodium in 24h).  3.   Follow fluid restriction of  1. no more than 2 liters in 24 hours..   4. Stop lasix 40 mg daily  5. Start Torsemide 20 mg twice a day.  6.  Start spironolactone 25 mg daily.  7. Follow up blood test morning am.  8. Call LVAD coordinator Monday afternoon to report  symptoms, weight, and to discuss results/plan.

## 2024-06-12 NOTE — PROCEDURES
Rocco France is a 68 y.o.  male patient, who presents for a 6 minute walk test ordered by MD Chloe.  The diagnosis is Left Ventricular Assist Device; Cardiomyopathy.  The patient's BMI is 24.4 kg/m2.  Predicted distance (lower limit of normal) is 378.2 meters.      Test Results:    The test was completed without stopping.  The total time walked was 360 seconds.  During walking, the patient reported:  No complaints.  The patient used no assistive devices during testing.     06/12/2024---------Distance: 213.36 meters (700 feet)     O2 Sat % Supplemental Oxygen Heart Rate Blood Pressure Heath Scale   Pre-exercise  (Resting) 99 % Room Air 94 bpm 108/73 mmHg 0   During Exercise 100 % Room Air 62 bpm Unable to obtain   3   Post-exercise  (Recovery) 100 % Room Air  68 bpm       Recovery Time:    72 seconds               The patient walked the first 15 seconds in 5.99 seconds.    Performing nurse/tech: Estopinal RRT      PREVIOUS STUDY:   12/07/2023---------Distance: 274.32 meters (900 feet)       O2 Sat % Supplemental Oxygen Heart Rate Blood Pressure Heath Scale   Pre-exercise  (Resting) 99 % Room Air 86 bpm 104/73 mmHg 0   During Exercise 100 % Room Air 97 bpm 107/75 mmHg 0   Post-exercise  (Recovery) 99 % Room Air  94 bpm          Recovery Time:    50 seconds               The patient walked the first 15 seconds in 5.12 seconds.      CLINICAL INTERPRETATION:  Six minute walk distance is 213.36 meters (700 feet) with moderate dyspnea.  During exercise, there was no desaturation while breathing room air.  Heart rate decreased significantly with walking.  This may represent an abnormal cardiovascular response to exercise.  The patient did not report non-pulmonary symptoms during exercise.  Significant exercise impairment is likely due to cardiovascular causes and subjective symptoms.  The patient did complete the study, walking 360 seconds of the 360 second test.  Since the previous study in December  2023, exercise capacity is significantly worse.  Based upon age and body mass index, exercise capacity is less than predicted.
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
## 2024-06-15 DIAGNOSIS — G47.00 INSOMNIA, UNSPECIFIED TYPE: ICD-10-CM

## 2024-06-15 DIAGNOSIS — R63.0 ANOREXIA: ICD-10-CM

## 2024-06-17 RX ORDER — MIRTAZAPINE 30 MG/1
30 TABLET, FILM COATED ORAL NIGHTLY
Qty: 30 TABLET | Refills: 0 | Status: SHIPPED | OUTPATIENT
Start: 2024-06-17

## 2024-06-20 ENCOUNTER — TELEPHONE (OUTPATIENT)
Dept: TRANSPLANT | Facility: CLINIC | Age: 69
End: 2024-06-20
Payer: MEDICARE

## 2024-06-20 NOTE — LETTER
This communication is flagged as high priority.        Date Sent:  06/20/2024      Rocco ESTRADA Román  1955      This patient will require the following lab(s). The patient will be instructed to report to your facility for their lab to be drawn.  Please fax all results to [phone]. INR results to be faxed to [phone]    LABS ORDERED   ICD 10    DATE REQUESTED:  Complete Metabolic Panel                ICD Z95.811                                        w/ next INR   BNP Level                                        ICD Z95.811 / I50.9                             w/ next INR          Zaida Renner, BRODIEN, RN, CCRN, VAD-C [phone]   BRODIE ThomasN, RN, [phone]   Mariela Barrow, MSN, RN, [phone]   Kacey Barr, [phone]       Fax all results to [phone].  Fax INR results to [phone].  Sincerely,      Deana Lake M.D.  Medical Director, Mechanical Circulatory Device Support Program  Section of Cardiomyopathy & Heart Transplantation

## 2024-06-20 NOTE — TELEPHONE ENCOUNTER
Spoke with [facility], they do not draw patient labs per his request. Labs are drawn at [facility]. New order for CMP/BNP faxed. LM w/ patient regarding need for f/u labs.    ----- Message from Mariela Barrow RN sent at 6/12/2024  3:46 PM CDT -----  Regarding: CMP/BNP- increased diuretics  [facility]  [phone]  Changes by [facility]  ----- Message -----  From: Mariela Barrow RN  Sent: 6/12/2024   3:03 PM CDT  To: [facility] Lvad Clinical  Subject: CMP/BNP- increased diuretics                     [facility]  [phone]

## 2024-06-21 ENCOUNTER — ANTI-COAG VISIT (OUTPATIENT)
Dept: CARDIOLOGY | Facility: CLINIC | Age: 69
End: 2024-06-21
Payer: MEDICARE

## 2024-06-21 DIAGNOSIS — Z95.811 LVAD (LEFT VENTRICULAR ASSIST DEVICE) PRESENT: Primary | ICD-10-CM

## 2024-06-21 LAB — INR PPP: 1.8

## 2024-06-21 NOTE — PROGRESS NOTES
[facility]    2024 1:49 PM    Assessment/Plan:    Patient presents today with therapeutic INR.    Assessment of patient findings and chart review: no significant findings     Recommendation for patient's warfarin regimen: Continue current maintenance dose    Recommend repeat INR in 1 week  _________________________________________________________________    Rocco NATALIE [place] (68 y.o.) is followed by the [employer] [facility] Management [facility].    Anticoagulation Summary  As of 2024      INR goal:  1.5-2.0   TTR:  55.5% (3.2 y)   INR used for dosin.8 (2024)   Warfarin maintenance plan:  5 mg (5 mg x 1) every Tue, Thu, Sat; 2.5 mg (5 mg x 0.5) all other days   Weekly warfarin total:  25 mg   Plan last modified:  Cheryl Barahona, PharmD (2024)   Next INR check:  2024   Target end date:      Indications    LVAD (left ventricular assist device) present [Z95.811]                 Anticoagulation Episode Summary       INR check location:      Preferred lab:      Send INR reminders to:  [facility] COUMADIN LVAD    Comments:  LVAD // Home Health  [phone]  //[facility] ph [phone]/ results Lab - [phone]          Anticoagulation Care Providers       Provider Role Specialty Phone number    Pete Garnett MD [facility] Cardiology [phone]

## 2024-06-26 ENCOUNTER — TELEPHONE (OUTPATIENT)
Dept: TRANSPLANT | Facility: CLINIC | Age: 69
End: 2024-06-26
Payer: MEDICARE

## 2024-06-26 ENCOUNTER — ANTI-COAG VISIT (OUTPATIENT)
Dept: CARDIOLOGY | Facility: CLINIC | Age: 69
End: 2024-06-26
Payer: MEDICARE

## 2024-06-26 DIAGNOSIS — Z95.811 LVAD (LEFT VENTRICULAR ASSIST DEVICE) PRESENT: Primary | ICD-10-CM

## 2024-06-26 LAB — INR PPP: 1.8

## 2024-06-26 PROCEDURE — 93793 ANTICOAG MGMT PT WARFARIN: CPT | Mod: S$GLB,,,

## 2024-06-26 NOTE — TELEPHONE ENCOUNTER
Contacted [facility] for lab results, patient has not been to lab since last week. Contacted patient who states he is going to lab today.

## 2024-06-26 NOTE — PROGRESS NOTES
[facility]    2024 1:15 PM    Assessment/Plan:    Patient presents today with therapeutic INR.    Assessment of patient findings and chart review: no significant findings     Recommendation for patient's warfarin regimen: Continue current maintenance dose    Recommend repeat INR in 1 week  _________________________________________________________________    Rocco NATALIE [place] (68 y.o.) is followed by the [employer] [facility]ticoagulation Management Program.    Anticoagulation Summary  As of 2024      INR goal:  1.5-2.0   TTR:  55.7% (3.2 y)   INR used for dosin.8 (2024)   Warfarin maintenance plan:  5 mg (5 mg x 1) every Tue, Thu, Sat; 2.5 mg (5 mg x 0.5) all other days   Weekly warfarin total:  25 mg   Plan last modified:  Cheryl Barahona, PharmD (2024)   Next INR check:  2024   Target end date:      Indications    LVAD (left ventricular assist device) present [Z95.811]                 Anticoagulation Episode Summary       INR check location:      Preferred lab:      Send INR reminders to:  [facility] COUMADIN LVAD    Comments:  LVAD // Home Health  [phone]  //[facility] ph [phone]/ results Lab - [phone]          Anticoagulation Care Providers       Provider Role Specialty Phone number    Pete Garnett MD [facility] Cardiology [phone]

## 2024-06-27 ENCOUNTER — TELEPHONE (OUTPATIENT)
Dept: TRANSPLANT | Facility: CLINIC | Age: 69
End: 2024-06-27
Payer: MEDICARE

## 2024-06-27 ENCOUNTER — PATIENT MESSAGE (OUTPATIENT)
Dept: TRANSPLANT | Facility: CLINIC | Age: 69
End: 2024-06-27
Payer: MEDICARE

## 2024-06-27 NOTE — TELEPHONE ENCOUNTER
Called lab to obtain results. Only BNP drawn yesterday. Notified patient who states he will go back to the lab today. Patient reports feeling well, states he feels like the change in diuretics has been helpful. Denies swelling, cough, shortness of breath, dyspnea on exertion or chest pain.  Will await CMP results.

## 2024-06-28 ENCOUNTER — APPOINTMENT (OUTPATIENT)
Dept: URBAN - METROPOLITAN AREA SURGERY 21 | Age: 69
Setting detail: DERMATOLOGY
End: 2024-06-30

## 2024-06-28 ENCOUNTER — TELEPHONE (OUTPATIENT)
Dept: TRANSPLANT | Facility: CLINIC | Age: 69
End: 2024-06-28
Payer: MEDICARE

## 2024-06-28 DIAGNOSIS — L57.0 ACTINIC KERATOSIS: ICD-10-CM

## 2024-06-28 DIAGNOSIS — D18.0 HEMANGIOMA: ICD-10-CM

## 2024-06-28 DIAGNOSIS — L82.1 OTHER SEBORRHEIC KERATOSIS: ICD-10-CM

## 2024-06-28 DIAGNOSIS — L73.8 OTHER SPECIFIED FOLLICULAR DISORDERS: ICD-10-CM

## 2024-06-28 DIAGNOSIS — L81.4 OTHER MELANIN HYPERPIGMENTATION: ICD-10-CM

## 2024-06-28 PROBLEM — D18.01 HEMANGIOMA OF SKIN AND SUBCUTANEOUS TISSUE: Status: ACTIVE | Noted: 2024-06-28

## 2024-06-28 LAB
EXT ALT: 12
EXT AST: 21
EXT BILIRUBIN TOTAL: 2.8
EXT BUN: 12
EXT CALCIUM: 8.3
EXT CHLORIDE: 103
EXT CREATININE: 1.2 MG/DL
EXT GLUCOSE: 77
EXT NT PROBNP: 3463
EXT POTASSIUM: 4.1
EXT PREALBUMIN: 2.8
EXT PROTEIN TOTAL: 8.8
EXT SODIUM: 141 MMOL/L

## 2024-06-28 PROCEDURE — 99212 OFFICE O/P EST SF 10 MIN: CPT | Mod: 25

## 2024-06-28 PROCEDURE — OTHER LIQUID NITROGEN: OTHER

## 2024-06-28 PROCEDURE — 17003 DESTRUCT PREMALG LES 2-14: CPT

## 2024-06-28 PROCEDURE — 17000 DESTRUCT PREMALG LESION: CPT

## 2024-06-28 PROCEDURE — OTHER COUNSELING: OTHER

## 2024-06-28 ASSESSMENT — LOCATION DETAILED DESCRIPTION DERM
LOCATION DETAILED: STERNUM
LOCATION DETAILED: INFERIOR THORACIC SPINE
LOCATION DETAILED: RIGHT MEDIAL SUPERIOR CHEST
LOCATION DETAILED: LEFT ELBOW
LOCATION DETAILED: EPIGASTRIC SKIN
LOCATION DETAILED: LEFT PROXIMAL POSTERIOR UPPER ARM
LOCATION DETAILED: RIGHT ANTERIOR SHOULDER
LOCATION DETAILED: LEFT FOREHEAD
LOCATION DETAILED: RIGHT ANTERIOR PROXIMAL UPPER ARM
LOCATION DETAILED: LEFT INFERIOR MEDIAL MIDBACK
LOCATION DETAILED: RIGHT POSTERIOR SHOULDER
LOCATION DETAILED: LEFT LATERAL UPPER BACK
LOCATION DETAILED: LEFT LATERAL EYEBROW
LOCATION DETAILED: RIGHT VENTRAL PROXIMAL FOREARM
LOCATION DETAILED: SUPERIOR THORACIC SPINE
LOCATION DETAILED: LEFT ANTERIOR DISTAL UPPER ARM
LOCATION DETAILED: LEFT DISTAL DORSAL FOREARM
LOCATION DETAILED: LEFT CLAVICULAR SKIN
LOCATION DETAILED: LEFT INFERIOR LATERAL UPPER BACK
LOCATION DETAILED: RIGHT CLAVICULAR SKIN
LOCATION DETAILED: RIGHT LATERAL SUPERIOR CHEST
LOCATION DETAILED: RIGHT FOREHEAD
LOCATION DETAILED: RIGHT SUPERIOR FOREHEAD
LOCATION DETAILED: RIGHT SUPERIOR UPPER BACK
LOCATION DETAILED: LEFT SUPERIOR LATERAL UPPER BACK
LOCATION DETAILED: LEFT SUPERIOR UPPER BACK
LOCATION DETAILED: RIGHT MEDIAL UPPER BACK
LOCATION DETAILED: RIGHT ANTERIOR DISTAL UPPER ARM
LOCATION DETAILED: LEFT ANTERIOR PROXIMAL UPPER ARM
LOCATION DETAILED: RIGHT RIB CAGE
LOCATION DETAILED: RIGHT SUPERIOR LATERAL UPPER BACK
LOCATION DETAILED: LEFT MEDIAL UPPER BACK
LOCATION DETAILED: LEFT VENTRAL PROXIMAL FOREARM
LOCATION DETAILED: RIGHT MEDIAL INFERIOR CHEST
LOCATION DETAILED: LEFT MEDIAL INFERIOR CHEST
LOCATION DETAILED: LEFT INFERIOR UPPER BACK
LOCATION DETAILED: RIGHT SUPERIOR LATERAL BUCCAL CHEEK
LOCATION DETAILED: LEFT MID-UPPER BACK
LOCATION DETAILED: LEFT MEDIAL FOREHEAD
LOCATION DETAILED: LEFT SUPERIOR FOREHEAD
LOCATION DETAILED: LEFT LATERAL FOREHEAD

## 2024-06-28 ASSESSMENT — LOCATION SIMPLE DESCRIPTION DERM
LOCATION SIMPLE: RIGHT CLAVICULAR SKIN
LOCATION SIMPLE: RIGHT SHOULDER
LOCATION SIMPLE: LEFT UPPER ARM
LOCATION SIMPLE: ABDOMEN
LOCATION SIMPLE: LEFT EYEBROW
LOCATION SIMPLE: LEFT FOREHEAD
LOCATION SIMPLE: UPPER BACK
LOCATION SIMPLE: LEFT LOWER BACK
LOCATION SIMPLE: RIGHT UPPER ARM
LOCATION SIMPLE: RIGHT CHEEK
LOCATION SIMPLE: LEFT FOREARM
LOCATION SIMPLE: RIGHT FOREHEAD
LOCATION SIMPLE: RIGHT FOREARM
LOCATION SIMPLE: LEFT UPPER BACK
LOCATION SIMPLE: CHEST
LOCATION SIMPLE: LEFT ELBOW
LOCATION SIMPLE: RIGHT UPPER BACK
LOCATION SIMPLE: LEFT CLAVICULAR SKIN

## 2024-06-28 ASSESSMENT — LOCATION ZONE DERM
LOCATION ZONE: FACE
LOCATION ZONE: ARM
LOCATION ZONE: TRUNK

## 2024-06-28 NOTE — TELEPHONE ENCOUNTER
6/28/24 1230: Called lab for CMP results.    Labs received and reviewed with patient.   Stop lasix 40 mg daily. Start Torsemide 20 mg twice a day. Start spironolactone 25 mg daily. Confirmed pt is taking correct doses.  He weighed 171 yesterday. He said he has no swelling.    T bili remains elevated at 2.9 like it was at clinic visit 6/12. AST/ALT normal. Will route labs to MD for eval. Per Dr. Garcia, continue monitoring T bili at clinic visits.  No changes at this time.

## 2024-07-01 RX ORDER — ATORVASTATIN CALCIUM 80 MG/1
80 TABLET, FILM COATED ORAL DAILY
Qty: 90 TABLET | Refills: 3 | Status: SHIPPED | OUTPATIENT
Start: 2024-07-01

## 2024-07-01 RX ORDER — LANOLIN ALCOHOL/MO/W.PET/CERES
1 CREAM (GRAM) TOPICAL 3 TIMES DAILY
Qty: 90 TABLET | Refills: 0 | Status: SHIPPED | OUTPATIENT
Start: 2024-07-01

## 2024-07-03 ENCOUNTER — ANTI-COAG VISIT (OUTPATIENT)
Dept: CARDIOLOGY | Facility: CLINIC | Age: 69
End: 2024-07-03
Payer: MEDICARE

## 2024-07-03 DIAGNOSIS — Z95.811 LVAD (LEFT VENTRICULAR ASSIST DEVICE) PRESENT: Primary | ICD-10-CM

## 2024-07-03 LAB — INR PPP: 1.6

## 2024-07-03 PROCEDURE — 93793 ANTICOAG MGMT PT WARFARIN: CPT | Mod: S$GLB,,,

## 2024-07-10 LAB — INR PPP: 1.4

## 2024-07-11 ENCOUNTER — ANTI-COAG VISIT (OUTPATIENT)
Dept: CARDIOLOGY | Facility: CLINIC | Age: 69
End: 2024-07-11
Payer: MEDICARE

## 2024-07-11 DIAGNOSIS — Z95.811 LVAD (LEFT VENTRICULAR ASSIST DEVICE) PRESENT: Primary | ICD-10-CM

## 2024-07-11 PROCEDURE — 93793 ANTICOAG MGMT PT WARFARIN: CPT | Mod: S$GLB,,,

## 2024-07-11 NOTE — PROGRESS NOTES
[email or web address]    2024 8:38 AM    Assessment/Plan:    Patient presents today with subtherapeutic  INR.    Assessment of patient findings and chart review: no significant findings     Recommendation for patient's warfarin regimen: Boost dose today to 7.5mg then resume current maintenance dose    Recommend repeat INR in 1 week  _________________________________________________________________    Rocco ESTRADA Román (68 y.o.) is followed by the [employer] Anticoagulation Management Program.    Anticoagulation Summary  As of 2024      INR goal:  1.5-2.0   TTR:  56.0% (3.3 y)   INR used for dosin.4 (7/10/2024)   Warfarin maintenance plan:  5 mg (5 mg x 1) every Tue, Thu, Sat; 2.5 mg (5 mg x 0.5) all other days   Weekly warfarin total:  25 mg   Plan last modified:  Cheryl Barahona, PharmD (2024)   Next INR check:  2024   Target end date:      Indications    LVAD (left ventricular assist device) present [Z95.811]                 Anticoagulation Episode Summary       INR check location:      Preferred lab:      Send INR reminders to:  [facility] COUMADIN LVAD    Comments:  LVAD // [facility] [phone]  //[facility] ph [phone]/ results Lab - [phone]          Anticoagulation Care Providers       Provider Role Specialty Phone number    Pete Garnett MD [facility] Cardiology [phone]

## 2024-07-18 ENCOUNTER — ANTI-COAG VISIT (OUTPATIENT)
Dept: CARDIOLOGY | Facility: CLINIC | Age: 69
End: 2024-07-18
Payer: MEDICARE

## 2024-07-18 DIAGNOSIS — Z95.811 LVAD (LEFT VENTRICULAR ASSIST DEVICE) PRESENT: Primary | ICD-10-CM

## 2024-07-18 LAB — INR PPP: 1.3

## 2024-07-18 PROCEDURE — 93793 ANTICOAG MGMT PT WARFARIN: CPT | Mod: S$GLB,,,

## 2024-07-18 NOTE — PROGRESS NOTES
Patient reports correct Warfarin dose, took Spironolactone 7/15/24, coleslaw 7/15/24, no other changes reported.

## 2024-07-18 NOTE — PROGRESS NOTES
[facility]    2024 9:49 AM    Assessment/Plan:    Patient presents today with subtherapeutic  INR.    Assessment of patient findings and chart review: reports coleslaw intake     Recommendation for patient's warfarin regimen: Boost dose today to 7.5mg then resume current maintenance dose    Recommend repeat INR Monday  _________________________________________________________________    Rocco ESTRADA Román (68 y.o.) is followed by the [employer] Anticoagulation Management Program.    Anticoagulation Summary  As of 2024      INR goal:  1.5-2.0   TTR:  55.6% (3.3 y)   INR used for dosin.3 (2024)   Warfarin maintenance plan:  5 mg (5 mg x 1) every e, Thu, Sat; 2.5 mg (5 mg x 0.5) all other days   Weekly warfarin total:  25 mg   Plan last modified:  Cheryl Barahona, PharmD (2024)   Next INR check:  2024   Target end date:      Indications    LVAD (left ventricular assist device) present [Z95.811]                 Anticoagulation Episode Summary       INR check location:      Preferred lab:      [facility]:  [facility]    Comments:  LVAD // [facility] [phone]  //[facility] ph [phone]/ results Lab - [phone]          Anticoagulation Care Providers       Provider Role Specialty Phone number    Pete Garnett MD [facility] Cardiology [phone]

## 2024-07-23 LAB — INR PPP: 1.8

## 2024-07-23 NOTE — PROGRESS NOTES
07/23/2024-Cira at [facility] stated pt didn't come in for INR on 7/22/24. Spoke to pt and he stated he will go for INR today.

## 2024-07-24 ENCOUNTER — ANTI-COAG VISIT (OUTPATIENT)
Dept: CARDIOLOGY | Facility: CLINIC | Age: 69
End: 2024-07-24
Payer: MEDICARE

## 2024-07-24 DIAGNOSIS — Z95.811 LVAD (LEFT VENTRICULAR ASSIST DEVICE) PRESENT: Primary | ICD-10-CM

## 2024-07-24 PROCEDURE — 93793 ANTICOAG MGMT PT WARFARIN: CPT | Mod: S$GLB,,,

## 2024-07-24 NOTE — PROGRESS NOTES
[facility]    2024 9:50 AM    Assessment/Plan:    Patient presents today with therapeutic INR.    Assessment of patient findings and chart review: no significant findings     Recommendation for patient's warfarin regimen: Continue current maintenance dose    Recommend repeat INR in 1 week  _________________________________________________________________    Rocco NATALIE [place] (68 y.o.) is followed by the [employer] [facility].    Anticoagulation Summary  As of 2024      INR goal:  1.5-2.0   TTR:  55.7% (3.3 y)   INR used for dosin.8 (2024)   Warfarin maintenance plan:  5 mg (5 mg x 1) every Tue, Thu, Sat; 2.5 mg (5 mg x 0.5) all other days   Weekly warfarin total:  25 mg   Plan last modified:  Cheryl Barahona, PharmD (2024)   Next INR check:  2024   Target end date:      Indications    LVAD (left ventricular assist device) present [Z95.811]                 Anticoagulation Episode Summary       INR check location:      Preferred lab:      Send INR reminders to:  [facility] COUMADIN LVAD    Comments:  LVAD // Home Health  [phone]  //[facility] ph [phone]/ results Lab - [phone]          Anticoagulation Care Providers       Provider Role Specialty Phone number    Pete Garnett MD [facility] Cardiology [phone]

## 2024-07-24 NOTE — PROGRESS NOTES
Patient verified warfarin dose: 2.5 mg Monday/Wednesday/Friday and 5 mg Tuesday/Thursday/Saturday and Sunday this past week, no other changes reported

## 2024-07-29 ENCOUNTER — ANTI-COAG VISIT (OUTPATIENT)
Dept: CARDIOLOGY | Facility: CLINIC | Age: 69
End: 2024-07-29
Payer: MEDICARE

## 2024-07-29 DIAGNOSIS — Z95.811 LVAD (LEFT VENTRICULAR ASSIST DEVICE) PRESENT: Primary | ICD-10-CM

## 2024-07-29 LAB — INR PPP: 1.9

## 2024-07-29 PROCEDURE — 93793 ANTICOAG MGMT PT WARFARIN: CPT | Mod: S$GLB,,,

## 2024-07-29 NOTE — PROGRESS NOTES
[facility]    2024 3:36 PM    Assessment/Plan:    Patient presents today with therapeutic INR.    Assessment of patient findings and chart review: no significant findings     Recommendation for patient's warfarin regimen: Continue current maintenance dose    Recommend repeat INR in 1 week  _________________________________________________________________    Rocco NATALIE [place] (68 y.o.) is followed by the [employer] Anticoagulation Management Program.    Anticoagulation Summary  As of 2024      INR goal:  1.5-2.0   TTR:  55.9% (3.3 y)   INR used for dosin.9 (2024)   Warfarin maintenance plan:  5 mg (5 mg x 1) every Tue, Thu, Sat; 2.5 mg (5 mg x 0.5) all other days   Weekly warfarin total:  25 mg   Plan last modified:  Cheryl Barahona, PharmD (2024)   Next INR check:  2024   Target end date:      Indications    LVAD (left ventricular assist device) present [Z95.811]                 Anticoagulation Episode Summary       INR check location:      Preferred lab:      Send INR reminders to:  [facility] COUMADIN LVAD    Comments:  LVAD // Home Health  [phone]  //[facility] ph [phone]/ results Lab - [phone]          Anticoagulation Care Providers       Provider Role Specialty Phone number    Pete Garnett MD [facility] Cardiology [phone]

## 2024-08-07 ENCOUNTER — ANTI-COAG VISIT (OUTPATIENT)
Dept: CARDIOLOGY | Facility: CLINIC | Age: 69
End: 2024-08-07
Payer: MEDICARE

## 2024-08-07 DIAGNOSIS — Z95.811 LVAD (LEFT VENTRICULAR ASSIST DEVICE) PRESENT: Primary | ICD-10-CM

## 2024-08-07 LAB — INR PPP: 1.5

## 2024-08-07 PROCEDURE — 93793 ANTICOAG MGMT PT WARFARIN: CPT | Mod: S$GLB,,,

## 2024-08-09 ENCOUNTER — PATIENT MESSAGE (OUTPATIENT)
Dept: TRANSPLANT | Facility: CLINIC | Age: 69
End: 2024-08-09
Payer: MEDICARE

## 2024-08-13 ENCOUNTER — ANTI-COAG VISIT (OUTPATIENT)
Dept: CARDIOLOGY | Facility: CLINIC | Age: 69
End: 2024-08-13
Payer: MEDICARE

## 2024-08-13 DIAGNOSIS — Z95.811 LVAD (LEFT VENTRICULAR ASSIST DEVICE) PRESENT: Primary | ICD-10-CM

## 2024-08-13 LAB — INR PPP: 1.3

## 2024-08-13 PROCEDURE — 93793 ANTICOAG MGMT PT WARFARIN: CPT | Mod: S$GLB,,,

## 2024-08-13 NOTE — PROGRESS NOTES
[facility]    2024 10:33 AM    Assessment/Plan:    Patient presents today with subtherapeutic  INR.    Assessment of patient findings and chart review: Believes he missed  dose     Recommendation for patient's warfarin regimen: Boost dose today to 7.5mg then resume current maintenance dose    Recommend repeat INR in 1 week  _________________________________________________________________    Rocco ESTRADA Román (68 y.o.) is followed by the [employer] Anticoagulation Management Program.    Anticoagulation Summary  As of 2024      INR goal:  1.5-2.0   TTR:  55.8% (3.4 y)   INR used for dosin.3 (2024)   Warfarin maintenance plan:  5 mg (5 mg x 1) every e, Thu, Sat; 2.5 mg (5 mg x 0.5) all other days   Weekly warfarin total:  25 mg   Plan last modified:  Cheryl Barahona, PharmD (2024)   Next INR check:  2024   Target end date:      Indications    LVAD (left ventricular assist device) present [Z95.811]                 Anticoagulation Episode Summary       INR check location:      Preferred lab:      Send INR reminders to:  [facility] COUMADIN LVAD    Comments:  LVAD // [facility] [phone]  //[facility] ph [phone]/ results Lab - [phone]          Anticoagulation Care Providers       Provider Role Specialty Phone number    Pete Garnett MD [facility] Cardiology [phone]

## 2024-08-13 NOTE — PROGRESS NOTES
Patient reports correct Warfarin dose and thinks he missed 8/11/24 dose but is unsure and drinks Boost almost everyday. No other changes reported.

## 2024-08-19 ENCOUNTER — ANTI-COAG VISIT (OUTPATIENT)
Dept: CARDIOLOGY | Facility: CLINIC | Age: 69
End: 2024-08-19
Payer: MEDICARE

## 2024-08-19 DIAGNOSIS — Z95.811 LVAD (LEFT VENTRICULAR ASSIST DEVICE) PRESENT: Primary | ICD-10-CM

## 2024-08-19 LAB — INR PPP: 1.7

## 2024-08-19 PROCEDURE — 93793 ANTICOAG MGMT PT WARFARIN: CPT | Mod: S$GLB,,,

## 2024-08-19 NOTE — PROGRESS NOTES
[facility]    2024 3:57 PM    Assessment/Plan:    Patient presents today with therapeutic INR.    Assessment of patient findings and chart review: no significant findings     Recommendation for patient's warfarin regimen: Continue current maintenance dose    Recommend repeat INR in 1 week  _________________________________________________________________    Rocco NATALIE [place] (68 y.o.) is followed by the [employer] [facility]tico[facility] Management Program.    Anticoagulation Summary  As of 2024      INR goal:  1.5-2.0   TTR:  55.8% (3.4 y)   INR used for dosin.7 (2024)   Warfarin maintenance plan:  5 mg (5 mg x 1) every Tue, Thu, Sat; 2.5 mg (5 mg x 0.5) all other days   Weekly warfarin total:  25 mg   Plan last modified:  Cheryl Barahona, PharmD (2024)   Next INR check:  2024   Target end date:      Indications    LVAD (left ventricular assist device) present [Z95.811]                 Anticoagulation Episode Summary       INR check location:      Preferred lab:      Send INR reminders to:  [facility] COUMADIN LVAD    Comments:  LVAD // Home Health  [phone]  //[facility] ph [phone]/ results Lab - [phone]          Anticoagulation Care Providers       Provider Role Specialty Phone number    Pete Garnett MD [facility] Cardiology [phone]

## 2024-08-27 ENCOUNTER — ANTI-COAG VISIT (OUTPATIENT)
Dept: CARDIOLOGY | Facility: CLINIC | Age: 69
End: 2024-08-27
Payer: MEDICARE

## 2024-08-27 DIAGNOSIS — Z95.811 LVAD (LEFT VENTRICULAR ASSIST DEVICE) PRESENT: Primary | ICD-10-CM

## 2024-08-27 LAB — INR PPP: 1.8

## 2024-08-27 PROCEDURE — 93793 ANTICOAG MGMT PT WARFARIN: CPT | Mod: S$GLB,,,

## 2024-08-27 NOTE — PROGRESS NOTES
[facility]    2024 9:46 AM    Assessment/Plan:    Patient presents today with therapeutic INR.    Assessment of patient findings and chart review: no significant findings     Recommendation for patient's warfarin regimen: Continue current maintenance dose    Recommend repeat INR in 1 week  _________________________________________________________________    Rocco NATALIE [place] (68 y.o.) is followed by the [employer] Anticoagulation Management Program.    Anticoagulation Summary  As of 2024      INR goal:  1.5-2.0   TTR:  56.0% (3.4 y)   INR used for dosin.8 (2024)   Warfarin maintenance plan:  5 mg (5 mg x 1) every Tue, Thu, Sat; 2.5 mg (5 mg x 0.5) all other days   Weekly warfarin total:  25 mg   Plan last modified:  Cheryl Barahona, PharmD (2024)   Next INR check:  2024   Target end date:      Indications    LVAD (left ventricular assist device) present [Z95.811]                 Anticoagulation Episode Summary       INR check location:      Preferred lab:      Send INR reminders to:  [facility] COUMADIN LVAD    Comments:  LVAD // Home Health  [phone]  //[facility] ph [phone]/ results Lab - [phone]          Anticoagulation Care Providers       Provider Role Specialty Phone number    Pete Garnett MD [facility] Cardiology [phone]

## 2024-09-03 ENCOUNTER — ANTI-COAG VISIT (OUTPATIENT)
Dept: CARDIOLOGY | Facility: CLINIC | Age: 69
End: 2024-09-03
Payer: MEDICARE

## 2024-09-03 DIAGNOSIS — Z95.811 LVAD (LEFT VENTRICULAR ASSIST DEVICE) PRESENT: Primary | ICD-10-CM

## 2024-09-03 LAB — INR PPP: 1.5

## 2024-09-03 PROCEDURE — 93793 ANTICOAG MGMT PT WARFARIN: CPT | Mod: S$GLB,,,

## 2024-09-03 NOTE — PROGRESS NOTES
[facility]    2024 2:59 PM    Assessment/Plan:    Patient presents today with therapeutic INR.    Assessment of patient findings and chart review: no significant findings     Recommendation for patient's warfarin regimen: Continue current maintenance dose    Recommend repeat INR in 1 week  _________________________________________________________________    Rocco NATALIE France (68 y.o.) is followed by the [employer] [facility].    Anticoagulation Summary  As of 9/3/2024      INR goal:  1.5-2.0   TTR:  56.3% (3.4 y)   INR used for dosin.5 (9/3/2024)   Warfarin maintenance plan:  5 mg (5 mg x 1) every Tue, Thu, Sat; 2.5 mg (5 mg x 0.5) all other days   Weekly warfarin total:  25 mg   Plan last modified:  Cheryl Barahona, PharmD (2024)   Next INR check:  2024   Target end date:      Indications    LVAD (left ventricular assist device) present [Z95.811]                 Anticoagulation Episode Summary       INR check location:      Preferred lab:      Send INR reminders to:  [facility] COUMADIN LVAD    Comments:  LVAD // Home Health  [phone]  //[facility] ph [phone]/ results Lab - [phone]          Anticoagulation Care Providers       Provider Role Specialty Phone number    Pete Garnett MD [facility] Cardiology [phone]

## 2024-09-09 NOTE — PROGRESS NOTES
"Met with pt due to concerns of inaccurate wt in system that states pt lost 20 lbs in 1 month.    Pt reports wt is consistent at 175 lbs (79 kg) since vad placement.     Breakfast is pancakes, haddad, eggs. Snack of cookies/chips. Lunch is chicken thigh and potatoes. Dinner is a snack of cookies or or chips.    Pt states he misses greens due to "cannot have vit k in diet." pt given a coumadin education and was recc get 100 mcg per day and was given handouts.    " Negative

## 2024-09-10 ENCOUNTER — PATIENT MESSAGE (OUTPATIENT)
Dept: PALLIATIVE MEDICINE | Facility: CLINIC | Age: 69
End: 2024-09-10
Payer: MEDICARE

## 2024-09-10 ENCOUNTER — ANTI-COAG VISIT (OUTPATIENT)
Dept: CARDIOLOGY | Facility: CLINIC | Age: 69
End: 2024-09-10
Payer: MEDICARE

## 2024-09-10 ENCOUNTER — TELEPHONE (OUTPATIENT)
Dept: PALLIATIVE MEDICINE | Facility: CLINIC | Age: 69
End: 2024-09-10
Payer: MEDICARE

## 2024-09-10 DIAGNOSIS — Z95.811 LVAD (LEFT VENTRICULAR ASSIST DEVICE) PRESENT: Primary | ICD-10-CM

## 2024-09-10 LAB — INR PPP: 1.7

## 2024-09-10 PROCEDURE — 93793 ANTICOAG MGMT PT WARFARIN: CPT | Mod: S$GLB,,,

## 2024-09-10 NOTE — PROGRESS NOTES
[facility]    09/10/2024 9:25 AM    Assessment/Plan:    Patient presents today with therapeutic INR.    Assessment of patient findings and chart review: no significant findings     Recommendation for patient's warfarin regimen: Continue current maintenance dose    Recommend repeat INR Thursday with VAD labs  _________________________________________________________________    Rocco ESTRADA Román (69 y.o.) is followed by the [employer] [facility].    Anticoagulation Summary  As of 9/10/2024      INR goal:  1.5-2.0   TTR:  56.6% (3.4 y)   INR used for dosin.7 (9/10/2024)   Warfarin maintenance plan:  5 mg (5 mg x 1) every Tue, Thu, Sat; 2.5 mg (5 mg x 0.5) all other days   Weekly warfarin total:  25 mg   Plan last modified:  Cheryl Barahona, PharmD (2024)   Next INR check:  2024   Target end date:      Indications    LVAD (left ventricular assist device) present [Z95.811]                 Anticoagulation Episode Summary       INR check location:      Preferred lab:      Send INR reminders to:  [facility] COUMADIN LVAD    Comments:  LVAD // Home Health  [phone]  //[facility] ph [phone]/ results Lab - [phone]          Anticoagulation Care Providers       Provider Role Specialty Phone number    Pete Garnett MD [facility] Cardiology [phone]

## 2024-09-12 ENCOUNTER — TELEPHONE (OUTPATIENT)
Dept: TRANSPLANT | Facility: CLINIC | Age: 69
End: 2024-09-12
Payer: MEDICARE

## 2024-09-12 ENCOUNTER — OFFICE VISIT (OUTPATIENT)
Dept: PALLIATIVE MEDICINE | Facility: CLINIC | Age: 69
End: 2024-09-12
Payer: MEDICARE

## 2024-09-12 DIAGNOSIS — R06.6 HICCUPS: ICD-10-CM

## 2024-09-12 DIAGNOSIS — Z95.811 LVAD (LEFT VENTRICULAR ASSIST DEVICE) PRESENT: Primary | ICD-10-CM

## 2024-09-12 DIAGNOSIS — Z51.5 PALLIATIVE CARE ENCOUNTER: ICD-10-CM

## 2024-09-12 DIAGNOSIS — I50.42 CHRONIC COMBINED SYSTOLIC AND DIASTOLIC HEART FAILURE: ICD-10-CM

## 2024-09-12 RX ORDER — PANTOPRAZOLE SODIUM 40 MG/1
40 TABLET, DELAYED RELEASE ORAL DAILY
Qty: 90 TABLET | Refills: 3 | Status: SHIPPED | OUTPATIENT
Start: 2024-09-12 | End: 2025-09-12
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
## 2024-09-12 NOTE — TELEPHONE ENCOUNTER
----- Message from Rehana Herrera MA sent at 9/12/2024  2:41 PM CDT -----  Patient would like to speak with Tran about two boxes of  bandages, patient can be reached at [phone]. Thank you

## 2024-09-13 NOTE — PROGRESS NOTES
09/13/2024-Leora at [facility] stated pt didn't come in for INR on 9/12/24. Spoke to pt and he stated he couldn't make it to the lab on 9/12/24 and will go for INR on 9/16/24.

## 2024-09-16 LAB — INR PPP: 2.1

## 2024-09-17 ENCOUNTER — ANTI-COAG VISIT (OUTPATIENT)
Dept: CARDIOLOGY | Facility: CLINIC | Age: 69
End: 2024-09-17
Payer: MEDICARE

## 2024-09-17 DIAGNOSIS — Z95.811 LVAD (LEFT VENTRICULAR ASSIST DEVICE) PRESENT: Primary | ICD-10-CM

## 2024-09-17 PROCEDURE — 93793 ANTICOAG MGMT PT WARFARIN: CPT | Mod: S$GLB,,,

## 2024-09-17 NOTE — PROGRESS NOTES
[facility]    2024 10:34 AM    Assessment/Plan:    Patient presents today with supratherapeutic INR.    Assessment of patient findings and chart review: Reports one serving of cranberry juice    Recommendation for patient's warfarin regimen: Lower dose today to 2.5mg then resume current maintenance dose    Recommend repeat INR in 1 week  _________________________________________________________________    Rocco ESTRADA Román (69 y.o.) is followed by the [employer] [facility].    Anticoagulation Summary  As of 2024      INR goal:  1.5-2.0   TTR:  56.6% (3.4 y)   INR used for dosin.1 (2024)   Warfarin maintenance plan:  5 mg (5 mg x 1) every Tue, Thu, Sat; 2.5 mg (5 mg x 0.5) all other days   Weekly warfarin total:  25 mg   Plan last modified:  Cheryl Barahona, PharmD (2024)   Next INR check:  2024   Target end date:      Indications    LVAD (left ventricular assist device) present [Z95.811]                 Anticoagulation Episode Summary       INR check location:      Preferred lab:      Send INR reminders to:  [facility] COUMADIN LVAD    Comments:  LVAD // Home Health 2000 [phone]  //[facility] ph [phone]/ results Lab - [phone]          Anticoagulation Care Providers       Provider Role Specialty Phone number    Pete Garnett MD [facility] Cardiology [phone]

## 2024-09-23 ENCOUNTER — ANTI-COAG VISIT (OUTPATIENT)
Dept: CARDIOLOGY | Facility: CLINIC | Age: 69
End: 2024-09-23
Payer: MEDICARE

## 2024-09-23 DIAGNOSIS — Z95.811 LVAD (LEFT VENTRICULAR ASSIST DEVICE) PRESENT: Primary | ICD-10-CM

## 2024-09-23 LAB — INR PPP: 2.4

## 2024-09-23 PROCEDURE — 93793 ANTICOAG MGMT PT WARFARIN: CPT | Mod: S$GLB,,,

## 2024-09-23 NOTE — PROGRESS NOTES
[facility]    2024 1:53 PM    Assessment/Plan:    Patient presents today with supratherapeutic INR.    Assessment of patient findings and chart review: Reports serving of cranberry juice and serving of grapefruit juice    Recommendation for patient's warfarin regimen: Hold dose today then decrease maintenance dose    Recommend repeat INR Thursday  _________________________________________________________________    Rocco ESTRADA Román (69 y.o.) is followed by the [employer] [facility].    Anticoagulation Summary  As of 2024      INR goal:  1.5-2.0   TTR:  56.3% (3.5 y)   INR used for dosin.4 (2024)   Warfarin maintenance plan:  5 mg (5 mg x 1) every e, Sat; 2.5 mg (5 mg x 0.5) all other days   Weekly warfarin total:  22.5 mg   Plan last modified:  Cheryl Barahona, PharmD (2024)   Next INR check:  2024   Target end date:      Indications    LVAD (left ventricular assist device) present [Z95.811]                 Anticoagulation Episode Summary       INR check location:      Preferred lab:      Send INR reminders to:  [facility] COUMADIN LVAD    Comments:  LVAD // Home Health 2000 [phone]  //[facility] ph [phone]/ results Lab - [phone]          Anticoagulation Care Providers       Provider Role Specialty Phone number    Pete Garnett MD [facility] Cardiology [phone]

## 2024-09-23 NOTE — PROGRESS NOTES
Patient reports correct Warfarin dose, cranberry juice 9/20/24, grapefruit juice 9/21/24, no other changes reported.

## 2024-09-26 LAB — INR PPP: 3.7

## 2024-09-27 ENCOUNTER — ANTI-COAG VISIT (OUTPATIENT)
Dept: CARDIOLOGY | Facility: CLINIC | Age: 69
End: 2024-09-27
Payer: MEDICARE

## 2024-09-27 DIAGNOSIS — Z95.811 LVAD (LEFT VENTRICULAR ASSIST DEVICE) PRESENT: Primary | ICD-10-CM

## 2024-09-27 NOTE — PROGRESS NOTES
Pt confirmed correct warfarin dose. He denies any changes to diet, health or medication. He denies any bleeding or any other changes

## 2024-09-27 NOTE — PROGRESS NOTES
[facility]    09/27/2024 3:33 PM    Assessment/Plan:    Patient presents today with supratherapeutic INR.    Assessment of patient findings and chart review: no significant findings     Recommendation for patient's warfarin regimen: Hold dose today then decrease maintenance dose    Recommend repeat INR Monday  _________________________________________________________________    Rocco ESTRADA Román (69 y.o.) is followed by the [employer] [facility].    Anticoagulation Summary  As of 9/27/2024      INR goal:  1.5-2.0   TTR:  56.2% (3.5 y)   INR used for dosing:  3.7 (9/26/2024)   Warfarin maintenance plan:  5 mg (5 mg x 1) every Tue; 2.5 mg (5 mg x 0.5) all other days   Weekly warfarin total:  20 mg   Plan last modified:  Cheryl Barahona, PharmD (9/27/2024)   Next INR check:  9/30/2024   Target end date:      Indications    LVAD (left ventricular assist device) present [Z95.811]                 Anticoagulation Episode Summary       INR check location:      Preferred lab:      Send INR reminders to:  [facility] COUMADIN LVAD    Comments:  LVAD // (no INR) [facility] 2000 [phone]  //[facility] ph [phone]/ results Lab - [phone]          Anticoagulation Care Providers       Provider Role Specialty Phone number    Pete Garnett MD [facility] Cardiology [phone]

## 2024-10-01 ENCOUNTER — TELEPHONE (OUTPATIENT)
Dept: TRANSPLANT | Facility: CLINIC | Age: 69
End: 2024-10-01
Payer: MEDICARE

## 2024-10-01 ENCOUNTER — ANTI-COAG VISIT (OUTPATIENT)
Dept: CARDIOLOGY | Facility: CLINIC | Age: 69
End: 2024-10-01
Payer: MEDICARE

## 2024-10-01 DIAGNOSIS — Z95.811 LVAD (LEFT VENTRICULAR ASSIST DEVICE) PRESENT: Primary | ICD-10-CM

## 2024-10-01 LAB — INR PPP: 2.2

## 2024-10-01 PROCEDURE — 93793 ANTICOAG MGMT PT WARFARIN: CPT | Mod: S$GLB,,,

## 2024-10-01 NOTE — TELEPHONE ENCOUNTER
VAD Contact: Left voicemail for Patient and Spouse regarding equipment maintenance due at upcoming clinic appointment on 10/10/2024.  Requested Patient and Spouse to bring Mobile Power Unit (MPU) and Emergency Bag (2 batteries, 2 clips, 1 backup controller) with them to next appointment for maintenance.   It is medically necessary to ensure patient has properly functioning equipment and wearables to prevent infection, injury or death to patient.

## 2024-10-02 NOTE — PROGRESS NOTES
10/02/24 Patient unable to verify warfarin dose, states did hold last Friday, and states took 2.5mg yesterday Tuesday, drinking small cup of cranberry juice at least benito a week,  no other changes reported

## 2024-10-02 NOTE — PROGRESS NOTES
[facility]    10/02/2024 9:26 AM    Assessment/Plan:    Patient presents today with supratherapeutic INR.    Assessment of patient findings and chart review: no significant findings     Recommendation for patient's warfarin regimen: Decrease maintenance dose    Recommend repeat INR tomorrow  _________________________________________________________________    Rocco ESTRADA Román (69 y.o.) is followed by the [employer] [facility].    Anticoagulation Summary  As of 10/1/2024      INR goal:  1.5-2.0   TTR:  56.0% (3.5 y)   INR used for dosin.2 (10/1/2024)   Warfarin maintenance plan:  2.5 mg (5 mg x 0.5) every day   Weekly warfarin total:  17.5 mg   Plan last modified:  Cheryl Barahona, PharmD (10/2/2024)   Next INR check:  10/3/2024   Target end date:  --    Indications    LVAD (left ventricular assist device) present [Z95.811]                 Anticoagulation Episode Summary       INR check location:  --    Preferred lab:  --    Send INR reminders to:  [facility] COUMADIN LVAD    Comments:  LVAD // (no INR) [facility]  [phone]  //[facility] ph [phone]/ results Lab - [phone]          Anticoagulation Care Providers       Provider Role Specialty Phone number    Pete Garnett MD [facility] Cardiology [phone]

## 2024-10-03 LAB — INR PPP: 2

## 2024-10-04 ENCOUNTER — ANTI-COAG VISIT (OUTPATIENT)
Dept: CARDIOLOGY | Facility: CLINIC | Age: 69
End: 2024-10-04
Payer: MEDICARE

## 2024-10-04 DIAGNOSIS — Z95.811 LVAD (LEFT VENTRICULAR ASSIST DEVICE) PRESENT: Primary | ICD-10-CM

## 2024-10-04 NOTE — PROGRESS NOTES
[facility]    10/04/2024 9:39 AM    Assessment/Plan:    Patient presents today with therapeutic INR.    Assessment of patient findings and chart review: no significant findings     Recommendation for patient's warfarin regimen: Continue current maintenance dose    Recommend repeat INR Monday  _________________________________________________________________    Rocco ESTRADA Román (69 y.o.) is followed by the [facility].    Anticoagulation Summary  As of 10/4/2024      INR goal:  1.5-2.0   TTR:  55.9% (3.5 y)   INR used for dosin.0 (10/3/2024)   Warfarin maintenance plan:  2.5 mg (5 mg x 0.5) every day   Weekly warfarin total:  17.5 mg   Plan last modified:  Cheryl Barahona, PharmD (10/2/2024)   Next INR check:  10/7/2024   Target end date:  --    Indications    LVAD (left ventricular assist device) present [Z95.811]                 Anticoagulation Episode Summary       INR check location:  --    Preferred lab:  --    Send INR reminders to:  [facility] LVAD    Comments:  LVAD // (no INR) [facility]  [phone]  //[facility] ph [phone]/ results Lab - [phone]          Anticoagulation Care Providers       Provider Role Specialty Phone number    Pete Garnett MD [facility] [phone]

## 2024-10-10 ENCOUNTER — CLINICAL SUPPORT (OUTPATIENT)
Dept: TRANSPLANT | Facility: CLINIC | Age: 69
End: 2024-10-10
Payer: MEDICARE

## 2024-10-10 ENCOUNTER — LAB VISIT (OUTPATIENT)
Dept: LAB | Facility: HOSPITAL | Age: 69
End: 2024-10-10
Payer: MEDICARE

## 2024-10-10 ENCOUNTER — OFFICE VISIT (OUTPATIENT)
Dept: TRANSPLANT | Facility: CLINIC | Age: 69
End: 2024-10-10
Payer: MEDICARE

## 2024-10-10 ENCOUNTER — ANTI-COAG VISIT (OUTPATIENT)
Dept: CARDIOLOGY | Facility: CLINIC | Age: 69
End: 2024-10-10
Payer: MEDICARE

## 2024-10-10 VITALS — TEMPERATURE: 98 F | HEIGHT: 74 IN | SYSTOLIC BLOOD PRESSURE: 100 MMHG | WEIGHT: 185 LBS | BODY MASS INDEX: 23.74 KG/M2

## 2024-10-10 DIAGNOSIS — Z95.811 HEART REPLACED BY HEART ASSIST DEVICE: ICD-10-CM

## 2024-10-10 DIAGNOSIS — Z95.811 HEART REPLACED BY HEART ASSIST DEVICE: Primary | ICD-10-CM

## 2024-10-10 DIAGNOSIS — Z95.811 LVAD (LEFT VENTRICULAR ASSIST DEVICE) PRESENT: Primary | ICD-10-CM

## 2024-10-10 DIAGNOSIS — Z79.01 ANTICOAGULATED: ICD-10-CM

## 2024-10-10 DIAGNOSIS — E78.2 MIXED HYPERLIPIDEMIA: ICD-10-CM

## 2024-10-10 DIAGNOSIS — I10 ESSENTIAL HYPERTENSION: ICD-10-CM

## 2024-10-10 DIAGNOSIS — Z95.810 ICD (IMPLANTABLE CARDIOVERTER-DEFIBRILLATOR) IN PLACE: Primary | ICD-10-CM

## 2024-10-10 DIAGNOSIS — I48.0 PAROXYSMAL ATRIAL FIBRILLATION: ICD-10-CM

## 2024-10-10 DIAGNOSIS — I50.43 ACUTE ON CHRONIC COMBINED SYSTOLIC AND DIASTOLIC HEART FAILURE: ICD-10-CM

## 2024-10-10 DIAGNOSIS — I42.8 NICM (NONISCHEMIC CARDIOMYOPATHY): ICD-10-CM

## 2024-10-10 DIAGNOSIS — E11.9 TYPE 2 DIABETES MELLITUS WITHOUT COMPLICATION, WITHOUT LONG-TERM CURRENT USE OF INSULIN: ICD-10-CM

## 2024-10-10 LAB
ALBUMIN SERPL BCP-MCNC: 2.5 G/DL (ref 3.5–5.2)
ALP SERPL-CCNC: 183 U/L (ref 55–135)
ALT SERPL W/O P-5'-P-CCNC: 7 U/L (ref 10–44)
ANION GAP SERPL CALC-SCNC: 7 MMOL/L (ref 8–16)
AST SERPL-CCNC: 20 U/L (ref 10–40)
BASOPHILS # BLD AUTO: 0.05 K/UL (ref 0–0.2)
BASOPHILS NFR BLD: 0.5 % (ref 0–1.9)
BILIRUB DIRECT SERPL-MCNC: 1.1 MG/DL (ref 0.1–0.3)
BILIRUB SERPL-MCNC: 1.9 MG/DL (ref 0.1–1)
BNP SERPL-MCNC: 1308 PG/ML (ref 0–99)
BUN SERPL-MCNC: 12 MG/DL (ref 8–23)
CALCIUM SERPL-MCNC: 8.9 MG/DL (ref 8.7–10.5)
CHLORIDE SERPL-SCNC: 106 MMOL/L (ref 95–110)
CO2 SERPL-SCNC: 26 MMOL/L (ref 23–29)
CREAT SERPL-MCNC: 1 MG/DL (ref 0.5–1.4)
CRP SERPL-MCNC: 30.8 MG/L (ref 0–8.2)
DIFFERENTIAL METHOD BLD: ABNORMAL
EOSINOPHIL # BLD AUTO: 0.3 K/UL (ref 0–0.5)
EOSINOPHIL NFR BLD: 2.8 % (ref 0–8)
ERYTHROCYTE [DISTWIDTH] IN BLOOD BY AUTOMATED COUNT: 17.2 % (ref 11.5–14.5)
EST. GFR  (NO RACE VARIABLE): >60 ML/MIN/1.73 M^2
GLUCOSE SERPL-MCNC: 133 MG/DL (ref 70–110)
HCT VFR BLD AUTO: 39.9 % (ref 40–54)
HGB BLD-MCNC: 12.2 G/DL (ref 14–18)
IMM GRANULOCYTES # BLD AUTO: 0.03 K/UL (ref 0–0.04)
IMM GRANULOCYTES NFR BLD AUTO: 0.3 % (ref 0–0.5)
INR PPP: 1.3 (ref 0.8–1.2)
LDH SERPL L TO P-CCNC: 248 U/L (ref 110–260)
LYMPHOCYTES # BLD AUTO: 2 K/UL (ref 1–4.8)
LYMPHOCYTES NFR BLD: 21.3 % (ref 18–48)
MAGNESIUM SERPL-MCNC: 1.9 MG/DL (ref 1.6–2.6)
MCH RBC QN AUTO: 28.3 PG (ref 27–31)
MCHC RBC AUTO-ENTMCNC: 30.6 G/DL (ref 32–36)
MCV RBC AUTO: 93 FL (ref 82–98)
MONOCYTES # BLD AUTO: 1 K/UL (ref 0.3–1)
MONOCYTES NFR BLD: 10.8 % (ref 4–15)
NEUTROPHILS # BLD AUTO: 6.1 K/UL (ref 1.8–7.7)
NEUTROPHILS NFR BLD: 64.3 % (ref 38–73)
NRBC BLD-RTO: 0 /100 WBC
PHOSPHATE SERPL-MCNC: 2.4 MG/DL (ref 2.7–4.5)
PLATELET # BLD AUTO: 174 K/UL (ref 150–450)
PMV BLD AUTO: 10.4 FL (ref 9.2–12.9)
POTASSIUM SERPL-SCNC: 4 MMOL/L (ref 3.5–5.1)
PREALB SERPL-MCNC: 7 MG/DL (ref 20–43)
PROT SERPL-MCNC: 8.5 G/DL (ref 6–8.4)
PROTHROMBIN TIME: 14.1 SEC (ref 9–12.5)
RBC # BLD AUTO: 4.31 M/UL (ref 4.6–6.2)
SODIUM SERPL-SCNC: 139 MMOL/L (ref 136–145)
WBC # BLD AUTO: 9.43 K/UL (ref 3.9–12.7)

## 2024-10-10 PROCEDURE — 93750 INTERROGATION VAD IN PERSON: CPT | Mod: S$GLB,,, | Performed by: INTERNAL MEDICINE

## 2024-10-10 PROCEDURE — 85025 COMPLETE CBC W/AUTO DIFF WBC: CPT | Performed by: INTERNAL MEDICINE

## 2024-10-10 PROCEDURE — 84100 ASSAY OF PHOSPHORUS: CPT | Performed by: INTERNAL MEDICINE

## 2024-10-10 PROCEDURE — 82248 BILIRUBIN DIRECT: CPT | Performed by: INTERNAL MEDICINE

## 2024-10-10 PROCEDURE — 84134 ASSAY OF PREALBUMIN: CPT | Performed by: INTERNAL MEDICINE

## 2024-10-10 PROCEDURE — 80053 COMPREHEN METABOLIC PANEL: CPT | Performed by: INTERNAL MEDICINE

## 2024-10-10 PROCEDURE — 85610 PROTHROMBIN TIME: CPT | Performed by: INTERNAL MEDICINE

## 2024-10-10 PROCEDURE — 86140 C-REACTIVE PROTEIN: CPT | Performed by: INTERNAL MEDICINE

## 2024-10-10 PROCEDURE — 83880 ASSAY OF NATRIURETIC PEPTIDE: CPT | Performed by: INTERNAL MEDICINE

## 2024-10-10 PROCEDURE — 83615 LACTATE (LD) (LDH) ENZYME: CPT | Performed by: INTERNAL MEDICINE

## 2024-10-10 PROCEDURE — 99999 PR PBB SHADOW E&M-EST. PATIENT-LVL III: CPT | Mod: PBBFAC,,, | Performed by: INTERNAL MEDICINE

## 2024-10-10 PROCEDURE — 36415 COLL VENOUS BLD VENIPUNCTURE: CPT | Performed by: INTERNAL MEDICINE

## 2024-10-10 PROCEDURE — 83735 ASSAY OF MAGNESIUM: CPT | Performed by: INTERNAL MEDICINE

## 2024-10-10 RX ORDER — FERROUS GLUCONATE 324(37.5)
324 TABLET ORAL
Qty: 30 TABLET | Refills: 11 | Status: SHIPPED | OUTPATIENT
Start: 2024-10-10 | End: 2025-10-10

## 2024-10-10 RX ORDER — AMLODIPINE BESYLATE 5 MG/1
10 TABLET ORAL DAILY
Qty: 60 TABLET | Refills: 11 | Status: SHIPPED | OUTPATIENT
Start: 2024-10-10 | End: 2025-10-10

## 2024-10-10 RX ORDER — FUROSEMIDE 10 MG/ML
80 INJECTION INTRAMUSCULAR; INTRAVENOUS
Status: COMPLETED | OUTPATIENT
Start: 2024-10-10 | End: 2024-10-10

## 2024-10-10 RX ADMIN — FUROSEMIDE 80 MG: 10 INJECTION INTRAMUSCULAR; INTRAVENOUS at 09:10
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
## 2024-10-10 NOTE — PROCEDURES
10/10/2024     8:52 AM 6/12/2024     9:35 AM 3/7/2024     9:32 AM 12/7/2023    11:13 AM 10/4/2023     9:38 AM 7/6/2023     9:06 AM 5/23/2023     4:28 AM   TXP JACEY INTERROGATIONS   Type HeartMate3 HeartMate3 HeartMate3 HeartMate3 HeartMate3 HeartMate3    Flow 3.8 3.6 4.3 4.5 4 3.7    Speed 5200 5200 5200 5200 5200 5200    PI 8.2 7.6 6.4 5.7 8.3 8.9    Power (Edwards) 3.6 3.5 3.8 3.8 3.7 3.6    LSL 4800 4800 4800 4800 4800 4800    Pulsatility No Pulse No Pulse Pulse Pulse Pulse Intermittent pulse Pulse   }

## 2024-10-10 NOTE — LETTER
October 10, 2024        Teresa Mendoza  [address]  [address]  [place] [address]  Phone: [phone]  Fax: [phone]             [id number]  [address]  [place] [address]  Phone: [phone]   Patient: Rocco France   MR Number: [id number]   YOB: 1955   Date of Visit: 10/10/2024       Dear Dr. Teresa Mendoza    Thank you for referring Rocco France to me for evaluation. Attached you will find relevant portions of my assessment and plan of care.    If you have questions, please do not hesitate to call me. I look forward to following Rocco France along with you.    Sincerely,    Vipul Herrera MD    Enclosure    If you would like to receive this communication electronically, please contact [email or web address] or [phone] to request [facility] access.    [facility] is a tool which provides read-only access to select patient information with whom you have a relationship. Its easy to use and provides real time access to review your patients record including encounter summaries, notes, results, and demographic information.    If you feel you have received this communication in error or would no longer like to receive these types of communications, please e-mail [email or web address]
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
## 2024-10-10 NOTE — PROGRESS NOTES
[facility]    10/10/2024 2:44 PM    Assessment/Plan:    Patient presents today with subtherapeutic  INR.    Assessment of patient findings and chart review: no significant findings     Recommendation for patient's warfarin regimen: Boost dose today to 5mg then resume current maintenance dose    Recommend repeat INR Monday  _________________________________________________________________    Rocco ESTRADA Román (69 y.o.) is followed by the [facility].    Anticoagulation Summary  As of 10/10/2024      INR goal:  1.5-2.0   TTR:  56.0% (3.5 y)   INR used for dosin.3 (10/10/2024)   Warfarin maintenance plan:  2.5 mg (5 mg x 0.5) every day   Weekly warfarin total:  17.5 mg   Plan last modified:  Cheryl Barahona, PharmD (10/2/2024)   Next INR check:  10/14/2024   Target end date:  --    Indications    LVAD (left ventricular assist device) present [Z95.811]                 Anticoagulation Episode Summary       INR check location:  --    Preferred lab:  --    Send INR reminders to:  [facility] LVAD    Comments:  LVAD // (no INR) [facility]  [phone]  //[facility] ph [phone]/ results Lab - [phone]          Anticoagulation Care Providers       Provider Role Specialty Phone number    Pete Garnett MD [facility] Cardiology [phone]

## 2024-10-10 NOTE — PROGRESS NOTES
Equipment:  Any Equipment Needs: Routine Maintenance    Emergency Bag  Emergency Equipment With Patient: Yes   Condition of emergency bag: No visible damage  Contents of emergency bag: Backup controller, 2 fully charged batteries, 2 battery clips, reference cards    Maintenance Tracking  Patient has monthly checklist today: No  Patient correctly utilizing monthly checklist:  N/A  Educated patient on utilizing monthly checklist correctly and importance of this: Yes    Equipment Inspection  Inspected patient's equipment today: Yes  Equipment clean and free of debris, including locking mechanism: Yes  Cleaned equipment today and educated patient on how to do this: Yes  Manual and visual inspection of driveline: NO   Kinks, rescue tape, tears: Yes  Clamshell repair: No  Perc lead repair: No    Patient wearing waist strap, vest, jimnea vest, concealed carry shirt: concealed carry shirt  Condition of this: No visible damage      Mobile Power Unit  Mobile power unit serial number:[id number]  MPU maintenance due: 4/2025  MPU maintenance completed today:  Yes  Mobile Power Unit 6 month maintenance and AA battery replacement complete. Power on test completed and passed. MPU, MPU patient cable and AC power cord inspected for damage and noted to be in good condition. Equipment cleaned.      Backup Controller and Emergency Backup Battery  EBB due to be charged: 4/2025  EBB charged today and self test completed: Yes  Self test passed:  Yes  EBB changed today: No    Equipment Needs  Equipment issued to patient today in clinic: No   Discussed with MCS Coordinator and physician: Yes  Items Under Warranty No VAD Coordinator Notified Yes  Equipment loaned to patient today: No   Waveforms sent: No   It is medically necessary to ensure patient has properly functioning equipment and wearables to prevent infection, injury or death to patient.

## 2024-10-15 ENCOUNTER — ANTI-COAG VISIT (OUTPATIENT)
Dept: CARDIOLOGY | Facility: CLINIC | Age: 69
End: 2024-10-15
Payer: MEDICARE

## 2024-10-15 DIAGNOSIS — Z95.811 LVAD (LEFT VENTRICULAR ASSIST DEVICE) PRESENT: Primary | ICD-10-CM

## 2024-10-15 LAB — INR PPP: 1.5

## 2024-10-15 PROCEDURE — 93793 ANTICOAG MGMT PT WARFARIN: CPT | Mod: S$GLB,,,

## 2024-10-21 ENCOUNTER — ANTI-COAG VISIT (OUTPATIENT)
Dept: CARDIOLOGY | Facility: CLINIC | Age: 69
End: 2024-10-21
Payer: MEDICARE

## 2024-10-21 DIAGNOSIS — Z95.811 LVAD (LEFT VENTRICULAR ASSIST DEVICE) PRESENT: Primary | ICD-10-CM

## 2024-10-21 LAB — INR PPP: 1.3

## 2024-10-21 PROCEDURE — 93793 ANTICOAG MGMT PT WARFARIN: CPT | Mod: S$GLB,,,

## 2024-10-21 NOTE — PROGRESS NOTES
[facility]    10/21/2024 4:12 PM    Assessment/Plan:    Patient presents today with subtherapeutic  INR.    Assessment of patient findings and chart review: reports increasing boost intake 2 weeks ago     Recommendation for patient's warfarin regimen: Increase maintenance dose    Recommend repeat INR Thursday  _________________________________________________________________    Rocco France (69 y.o.) is followed by the [facility].    Anticoagulation Summary  As of 10/21/2024      INR goal:  1.5-2.0   TTR:  55.5% (3.5 y)   INR used for dosin.3 (10/21/2024)   Warfarin maintenance plan:  5 mg (5 mg x 1) every Mon; 2.5 mg (5 mg x 0.5) all other days   Weekly warfarin total:  20 mg   Plan last modified:  Cheryl Barahona, PharmD (10/21/2024)   Next INR check:  10/24/2024   Target end date:  --    Indications    LVAD (left ventricular assist device) present [Z95.811]                 Anticoagulation Episode Summary       INR check location:  --    Preferred lab:  --    Send INR reminders to:  [facility] COUMADIN LVAD    Comments:  LVAD // (no INR) [facility]  [phone]  //[facility] ph [phone]/ results Lab - [phone]          Anticoagulation Care Providers       Provider Role Specialty Phone number    Pete Garnett MD [facility] Cardiology [phone]

## 2024-10-21 NOTE — PROGRESS NOTES
Patient reports correct Warfarin dose, has been drinking 1 Ensure BID for the past 2 weeks, no other changes reported.

## 2024-10-24 ENCOUNTER — ANTI-COAG VISIT (OUTPATIENT)
Dept: CARDIOLOGY | Facility: CLINIC | Age: 69
End: 2024-10-24
Payer: MEDICARE

## 2024-10-24 DIAGNOSIS — Z95.811 LVAD (LEFT VENTRICULAR ASSIST DEVICE) PRESENT: Primary | ICD-10-CM

## 2024-10-24 LAB — INR PPP: 1.5

## 2024-10-24 PROCEDURE — 93793 ANTICOAG MGMT PT WARFARIN: CPT | Mod: S$GLB,,,

## 2024-10-24 NOTE — PROGRESS NOTES
[facility]    10/24/2024    Rocco France (69 y.o.) is followed by the [facility].      Assessment/Plan:    Rocco France presents with a therapeutic INR. Goal INR: 1.5-2.0    Lab Results   Component Value Date    INR 1.5 10/24/2024    INR 1.3 10/21/2024    INR 1.5 10/14/2024       Assessment of patient findings per MA/LPN and chart review:   The following significant findings were found:   None    Recommendation for patient's warfarin regimen:   No change was made to warfarin therapy during this visit and patient has been instructed to continue their current warfarin regimen.    Recommended repeat INR in 1 week      Bradley ZacariasD, BCPS  Clinical Pharmacist - [facility]  Preferred Contact: Secure Messaging or In Basket Message

## 2024-10-25 ENCOUNTER — PATIENT MESSAGE (OUTPATIENT)
Dept: CARDIOTHORACIC SURGERY | Facility: CLINIC | Age: 69
End: 2024-10-25
Payer: MEDICARE

## 2024-10-25 DIAGNOSIS — Z95.811 LVAD (LEFT VENTRICULAR ASSIST DEVICE) PRESENT: Primary | ICD-10-CM

## 2024-10-31 ENCOUNTER — ANTI-COAG VISIT (OUTPATIENT)
Dept: CARDIOLOGY | Facility: CLINIC | Age: 69
End: 2024-10-31
Payer: MEDICARE

## 2024-10-31 DIAGNOSIS — Z95.811 LVAD (LEFT VENTRICULAR ASSIST DEVICE) PRESENT: Primary | ICD-10-CM

## 2024-10-31 LAB — INR PPP: 1.6

## 2024-10-31 PROCEDURE — 93793 ANTICOAG MGMT PT WARFARIN: CPT | Mod: S$GLB,,,

## 2024-11-05 ENCOUNTER — TELEPHONE (OUTPATIENT)
Dept: TRANSPLANT | Facility: CLINIC | Age: 69
End: 2024-11-05
Payer: MEDICARE

## 2024-11-05 DIAGNOSIS — Z95.811 LVAD (LEFT VENTRICULAR ASSIST DEVICE) PRESENT: Primary | ICD-10-CM

## 2024-11-05 NOTE — TELEPHONE ENCOUNTER
Received request from  that patient would like to see a Dr. Henrry Devi in [place] for episodes of urinary dribbling, incontinence and frequency. Denies urgency, dysuria, malodorous or gross hematuria but difficulty initiating stream per HH. Referral placed and faxed

## 2024-11-07 ENCOUNTER — ANTI-COAG VISIT (OUTPATIENT)
Dept: CARDIOLOGY | Facility: CLINIC | Age: 69
End: 2024-11-07
Payer: MEDICARE

## 2024-11-07 DIAGNOSIS — Z95.811 LVAD (LEFT VENTRICULAR ASSIST DEVICE) PRESENT: Primary | ICD-10-CM

## 2024-11-07 LAB — INR PPP: 1.8

## 2024-11-07 PROCEDURE — 93793 ANTICOAG MGMT PT WARFARIN: CPT | Mod: S$GLB,,,

## 2024-11-07 NOTE — PROGRESS NOTES
[facility]    2024 2:54 PM    Assessment/Plan:    Patient presents today with therapeutic INR.    Assessment of patient findings and chart review: no significant findings     Recommendation for patient's warfarin regimen: Continue current maintenance dose    Recommend repeat INR in 1 week  _________________________________________________________________    Rocco ESTRADA Román (69 y.o.) is followed by the [facility].    Anticoagulation Summary  As of 2024      INR goal:  1.5-2.0   TTR:  55.9% (3.6 y)   INR used for dosin.8 (2024)   Warfarin maintenance plan:  5 mg (5 mg x 1) every Mon; 2.5 mg (5 mg x 0.5) all other days   Weekly warfarin total:  20 mg   Plan last modified:  Cheryl Barahona, PharmD (10/21/2024)   Next INR check:  2024   Target end date:  --    Indications    LVAD (left ventricular assist device) present [Z95.811]                 Anticoagulation Episode Summary       INR check location:  --    Preferred lab:  --    Send INR reminders to:  DOREEN COUMADIN LVAD    Comments:  LVAD // (no INR) [facility]  [phone]  //[facility] ph [phone]/ results Lab - [phone]          Anticoagulation Care Providers       Provider Role Specialty Phone number    Pete Garnett MD [facility] Cardiology [phone]

## 2024-11-08 ENCOUNTER — PATIENT MESSAGE (OUTPATIENT)
Dept: TRANSPLANT | Facility: CLINIC | Age: 69
End: 2024-11-08
Payer: MEDICARE

## 2024-11-11 ENCOUNTER — TELEPHONE (OUTPATIENT)
Dept: TRANSPLANT | Facility: CLINIC | Age: 69
End: 2024-11-11
Payer: MEDICARE

## 2024-11-11 NOTE — TELEPHONE ENCOUNTER
Called home health to discuss report asking for urology consult. I explained to , he can be set up with an urologist close to home, can come from PCP.  She will work with him to set up.

## 2024-11-12 RX ORDER — LANOLIN ALCOHOL/MO/W.PET/CERES
1 CREAM (GRAM) TOPICAL 3 TIMES DAILY
Qty: 90 TABLET | Refills: 11 | Status: SHIPPED | OUTPATIENT
Start: 2024-11-12

## 2024-11-14 ENCOUNTER — ANTI-COAG VISIT (OUTPATIENT)
Dept: CARDIOLOGY | Facility: CLINIC | Age: 69
End: 2024-11-14
Payer: MEDICARE

## 2024-11-14 DIAGNOSIS — Z95.811 LVAD (LEFT VENTRICULAR ASSIST DEVICE) PRESENT: Primary | ICD-10-CM

## 2024-11-14 LAB — INR PPP: 1.6

## 2024-11-14 PROCEDURE — 93793 ANTICOAG MGMT PT WARFARIN: CPT | Mod: S$GLB,,,

## 2024-11-14 NOTE — PROGRESS NOTES
[facility]    11/14/2024    Rocco France (69 y.o.) is followed by the [facility].      Assessment/Plan:    Rocco France presents with a therapeutic INR. Goal INR: 1.5-2.0    Lab Results   Component Value Date    INR 1.6 11/14/2024    INR 1.8 11/07/2024    INR 1.6 10/31/2024       Assessment of patient findings per MA/LPN and chart review:   The following significant findings were found:   None    Recommendation for patient's warfarin regimen:   No change was made to warfarin therapy during this visit and patient has been instructed to continue their current warfarin regimen.    Recommended repeat INR in 1 week      Bradley ZacariasD, BCPS  Clinical Pharmacist - [facility]  Preferred Contact: Secure Messaging or In Basket Message

## 2024-11-21 ENCOUNTER — ANTI-COAG VISIT (OUTPATIENT)
Dept: CARDIOLOGY | Facility: CLINIC | Age: 69
End: 2024-11-21
Payer: MEDICARE

## 2024-11-21 DIAGNOSIS — Z95.811 LVAD (LEFT VENTRICULAR ASSIST DEVICE) PRESENT: Primary | ICD-10-CM

## 2024-11-21 LAB — INR PPP: 1.8

## 2024-11-21 PROCEDURE — 93793 ANTICOAG MGMT PT WARFARIN: CPT | Mod: S$GLB,,,

## 2024-11-21 NOTE — PROGRESS NOTES
[facility]    2024 4:22 PM    Assessment/Plan:    Patient presents today with therapeutic INR.    Assessment of patient findings and chart review: no significant findings     Recommendation for patient's warfarin regimen: Continue current maintenance dose    Recommend repeat INR Tuesday  _________________________________________________________________    Rocco ESTRADA Román (69 y.o.) is followed by the [facility].    Anticoagulation Summary  As of 2024      INR goal:  1.5-2.0   TTR:  56.3% (3.6 y)   INR used for dosin.8 (2024)   Warfarin maintenance plan:  5 mg (5 mg x 1) every Mon; 2.5 mg (5 mg x 0.5) all other days   Weekly warfarin total:  20 mg   Plan last modified:  Cheryl Barahona, PharmD (10/21/2024)   Next INR check:  2024   Target end date:  --    Indications    LVAD (left ventricular assist device) present [Z95.811]                 Anticoagulation Episode Summary       INR check location:  --    Preferred lab:  --    Send INR reminders to:  DOREEN COUMADIN LVAD    Comments:  LVAD // (no INR) [facility]  [phone]  //[facility] ph [phone]/ results Lab - [phone]          Anticoagulation Care Providers       Provider Role Specialty Phone number    Pete Garnett MD [facility] Cardiology [phone]

## 2024-11-21 NOTE — PROGRESS NOTES
Barbara at [facility] gave verbal read as PT 18.5, INR 1.8, dated for today, hard copy to be faxed.

## 2024-11-27 ENCOUNTER — ANTI-COAG VISIT (OUTPATIENT)
Dept: CARDIOLOGY | Facility: CLINIC | Age: 69
End: 2024-11-27
Payer: MEDICARE

## 2024-11-27 DIAGNOSIS — Z95.811 LVAD (LEFT VENTRICULAR ASSIST DEVICE) PRESENT: Primary | ICD-10-CM

## 2024-11-27 DIAGNOSIS — E83.42 HYPOMAGNESEMIA: Primary | ICD-10-CM

## 2024-11-27 LAB — INR PPP: 1.7

## 2024-11-27 PROCEDURE — 93793 ANTICOAG MGMT PT WARFARIN: CPT | Mod: S$GLB,,,

## 2024-11-27 NOTE — PROGRESS NOTES
[facility]    2024 3:54 PM    Assessment/Plan:    Patient presents today with therapeutic INR.    Assessment of patient findings and chart review: no significant findings     Recommendation for patient's warfarin regimen: Continue current maintenance dose    Recommend repeat INR in 1 week  _________________________________________________________________    Rocco ESTRADA Román (69 y.o.) is followed by the [facility].    Anticoagulation Summary  As of 2024      INR goal:  1.5-2.0   TTR:  56.5% (3.6 y)   INR used for dosin.7 (2024)   Warfarin maintenance plan:  5 mg (5 mg x 1) every Mon; 2.5 mg (5 mg x 0.5) all other days   Weekly warfarin total:  20 mg   Plan last modified:  Cheryl Barahona, PharmD (10/21/2024)   Next INR check:  2024   Target end date:  --    Indications    LVAD (left ventricular assist device) present [Z95.811]                 Anticoagulation Episode Summary       INR check location:  --    Preferred lab:  --    Send INR reminders to:  DOREEN COUMADIN LVAD    Comments:  LVAD // (no INR) [facility]  [phone]  //[facility] ph [phone]/ results Lab - [phone]          Anticoagulation Care Providers       Provider Role Specialty Phone number    Pete Garnett MD [facility] Cardiology [phone]

## 2024-12-02 ENCOUNTER — PATIENT MESSAGE (OUTPATIENT)
Dept: PALLIATIVE MEDICINE | Facility: CLINIC | Age: 69
End: 2024-12-02
Payer: MEDICARE

## 2024-12-02 ENCOUNTER — TELEPHONE (OUTPATIENT)
Dept: PALLIATIVE MEDICINE | Facility: CLINIC | Age: 69
End: 2024-12-02
Payer: MEDICARE

## 2024-12-06 ENCOUNTER — ANTI-COAG VISIT (OUTPATIENT)
Dept: CARDIOLOGY | Facility: CLINIC | Age: 69
End: 2024-12-06
Payer: MEDICARE

## 2024-12-06 DIAGNOSIS — Z95.811 LVAD (LEFT VENTRICULAR ASSIST DEVICE) PRESENT: Primary | ICD-10-CM

## 2024-12-06 LAB — INR PPP: 1.9

## 2024-12-06 NOTE — PROGRESS NOTES
[facility]    2024 9:01 AM    Assessment/Plan:    Patient presents today with therapeutic INR.    Assessment of patient findings and chart review: no significant findings     Recommendation for patient's warfarin regimen: Continue current maintenance dose    Recommend repeat INR in 1 week  _________________________________________________________________    Rocco ESTRADA Román (69 y.o.) is followed by the [facility].    Anticoagulation Summary  As of 2024      INR goal:  1.5-2.0   TTR:  56.8% (3.7 y)   INR used for dosin.9 (2024)   Warfarin maintenance plan:  5 mg (5 mg x 1) every Mon; 2.5 mg (5 mg x 0.5) all other days   Weekly warfarin total:  20 mg   Plan last modified:  Cheryl Barahona, PharmD (10/21/2024)   Next INR check:  2024   Target end date:  --    Indications    LVAD (left ventricular assist device) present [Z95.811]                 Anticoagulation Episode Summary       INR check location:  --    Preferred lab:  --    Send INR reminders to:  DOREEN COUMADIN LVAD    Comments:  LVAD // (no INR) [facility]  [phone]  //[facility] ph [phone]/ results Lab - [phone]          Anticoagulation Care Providers       Provider Role Specialty Phone number    Pete Garnett MD [facility] Cardiology [phone]

## 2024-12-09 RX ORDER — LANOLIN ALCOHOL/MO/W.PET/CERES
1 CREAM (GRAM) TOPICAL 3 TIMES DAILY
Qty: 90 TABLET | Refills: 11 | Status: SHIPPED | OUTPATIENT
Start: 2024-12-09

## 2024-12-12 ENCOUNTER — ANTI-COAG VISIT (OUTPATIENT)
Dept: CARDIOLOGY | Facility: CLINIC | Age: 69
End: 2024-12-12
Payer: MEDICARE

## 2024-12-12 DIAGNOSIS — Z95.811 LVAD (LEFT VENTRICULAR ASSIST DEVICE) PRESENT: Primary | ICD-10-CM

## 2024-12-12 LAB — INR PPP: 1.8

## 2024-12-12 PROCEDURE — 93793 ANTICOAG MGMT PT WARFARIN: CPT | Mod: S$GLB,,,

## 2024-12-12 NOTE — PROGRESS NOTES
[facility]    2024 3:27 PM    Assessment/Plan:    Patient presents today with therapeutic INR.    Assessment of patient findings and chart review: no significant findings     Recommendation for patient's warfarin regimen: Continue current maintenance dose    Recommend repeat INR in 1 week  _________________________________________________________________    Rocco ESTRADA Román (69 y.o.) is followed by the [facility].    Anticoagulation Summary  As of 2024      INR goal:  1.5-2.0   TTR:  57.0% (3.7 y)   INR used for dosin.8 (2024)   Warfarin maintenance plan:  5 mg (5 mg x 1) every Mon; 2.5 mg (5 mg x 0.5) all other days   Weekly warfarin total:  20 mg   Plan last modified:  Cheryl Barahona, PharmD (10/21/2024)   Next INR check:  2024   Target end date:  --    Indications    LVAD (left ventricular assist device) present [Z95.811]                 Anticoagulation Episode Summary       INR check location:  --    Preferred lab:  --    Send INR reminders to:  DOREEN COUMADIN LVAD    Comments:  LVAD // (no INR) [facility]  [phone]  //[facility] ph [phone]/ results Lab - [phone]          Anticoagulation Care Providers       Provider Role Specialty Phone number    Pete Garnett MD [facility] Cardiology [phone]

## 2024-12-19 ENCOUNTER — ANTI-COAG VISIT (OUTPATIENT)
Dept: CARDIOLOGY | Facility: CLINIC | Age: 69
End: 2024-12-19
Payer: MEDICARE

## 2024-12-19 DIAGNOSIS — Z95.811 LVAD (LEFT VENTRICULAR ASSIST DEVICE) PRESENT: Primary | ICD-10-CM

## 2024-12-19 LAB — INR PPP: 1.7

## 2024-12-19 PROCEDURE — 93793 ANTICOAG MGMT PT WARFARIN: CPT | Mod: S$GLB,,,

## 2024-12-19 NOTE — PROGRESS NOTES
[facility]    2024 3:52 PM    Assessment/Plan:    Patient presents today with therapeutic INR.    Assessment of patient findings and chart review: no significant findings     Recommendation for patient's warfarin regimen: Continue current maintenance dose    Recommend repeat INR in 1 week  _________________________________________________________________    Rocco ESTRADA Román (69 y.o.) is followed by the [facility].    Anticoagulation Summary  As of 2024      INR goal:  1.5-2.0   TTR:  57.2% (3.7 y)   INR used for dosin.7 (2024)   Warfarin maintenance plan:  5 mg (5 mg x 1) every Mon; 2.5 mg (5 mg x 0.5) all other days   Weekly warfarin total:  20 mg   Plan last modified:  Cheryl Barahona, PharmD (10/21/2024)   Next INR check:  2024   Target end date:  --    Indications    LVAD (left ventricular assist device) present [Z95.811]                 Anticoagulation Episode Summary       INR check location:  --    Preferred lab:  --    Send INR reminders to:  DOREEN COUMADIN LVAD    Comments:  LVAD // (no INR) [facility]  [phone]  //[facility] ph [phone]/ results Lab - [phone]          Anticoagulation Care Providers       Provider Role Specialty Phone number    Pete Garnett MD [facility] Cardiology [phone]

## 2024-12-26 ENCOUNTER — ANTI-COAG VISIT (OUTPATIENT)
Dept: CARDIOLOGY | Facility: CLINIC | Age: 69
End: 2024-12-26
Payer: MEDICARE

## 2024-12-26 DIAGNOSIS — Z95.811 LVAD (LEFT VENTRICULAR ASSIST DEVICE) PRESENT: Primary | ICD-10-CM

## 2024-12-26 LAB — INR PPP: 1.9

## 2024-12-26 PROCEDURE — 93793 ANTICOAG MGMT PT WARFARIN: CPT | Mod: S$GLB,,,

## 2024-12-26 NOTE — PROGRESS NOTES
Monica at [facility] gave verbal read as INR 1.9, PT 18.9, dated for today, tried to fax hard copy twice to us and will try again.

## 2024-12-26 NOTE — PROGRESS NOTES
[facility]    2024 3:15 PM    Assessment/Plan:    Patient presents today with therapeutic INR.    Assessment of patient findings and chart review: no significant findings     Recommendation for patient's warfarin regimen: Continue current maintenance dose    Recommend repeat INR in 1 week  _________________________________________________________________    Rocco ESTRADA Román (69 y.o.) is followed by the [facility].    Anticoagulation Summary  As of 2024      INR goal:  1.5-2.0   TTR:  57.5% (3.7 y)   INR used for dosin.9 (2024)   Warfarin maintenance plan:  5 mg (5 mg x 1) every Mon; 2.5 mg (5 mg x 0.5) all other days   Weekly warfarin total:  20 mg   Plan last modified:  Cheryl Barahona, PharmD (10/21/2024)   Next INR check:  2025   Target end date:  --    Indications    LVAD (left ventricular assist device) present [Z95.811]                 Anticoagulation Episode Summary       INR check location:  --    Preferred lab:  --    Send INR reminders to:  DOREEN COUMADIN LVAD    Comments:  LVAD // (no INR) [facility]  [phone]  //[facility] ph [phone]/ results Lab - [phone]          Anticoagulation Care Providers       Provider Role Specialty Phone number    Pete Garnett MD [facility] Cardiology [phone]

## 2024-12-30 ENCOUNTER — APPOINTMENT (OUTPATIENT)
Dept: URBAN - METROPOLITAN AREA SURGERY 21 | Age: 69
Setting detail: DERMATOLOGY
End: 2024-12-30

## 2024-12-30 DIAGNOSIS — L82.0 INFLAMED SEBORRHEIC KERATOSIS: ICD-10-CM

## 2024-12-30 DIAGNOSIS — D22 MELANOCYTIC NEVI: ICD-10-CM

## 2024-12-30 DIAGNOSIS — D18.0 HEMANGIOMA: ICD-10-CM

## 2024-12-30 PROBLEM — D22.5 MELANOCYTIC NEVI OF TRUNK: Status: ACTIVE | Noted: 2024-12-30

## 2024-12-30 PROBLEM — D18.01 HEMANGIOMA OF SKIN AND SUBCUTANEOUS TISSUE: Status: ACTIVE | Noted: 2024-12-30

## 2024-12-30 PROCEDURE — 17110 DESTRUCT B9 LESION 1-14: CPT

## 2024-12-30 PROCEDURE — OTHER COUNSELING: OTHER

## 2024-12-30 PROCEDURE — OTHER LIQUID NITROGEN: OTHER

## 2024-12-30 PROCEDURE — 99212 OFFICE O/P EST SF 10 MIN: CPT | Mod: 25

## 2024-12-30 ASSESSMENT — LOCATION DETAILED DESCRIPTION DERM
LOCATION DETAILED: LEFT INFERIOR MEDIAL MIDBACK
LOCATION DETAILED: RIGHT SUPERIOR LATERAL MIDBACK
LOCATION DETAILED: LEFT SUPERIOR UPPER BACK
LOCATION DETAILED: RIGHT SUPERIOR LATERAL UPPER BACK
LOCATION DETAILED: RIGHT INFERIOR LATERAL UPPER BACK
LOCATION DETAILED: RIGHT RIB CAGE
LOCATION DETAILED: RIGHT LATERAL ABDOMEN
LOCATION DETAILED: LEFT LATERAL SUPERIOR CHEST
LOCATION DETAILED: LEFT LATERAL BUCCAL CHEEK
LOCATION DETAILED: LEFT RIB CAGE
LOCATION DETAILED: LEFT LATERAL ABDOMEN
LOCATION DETAILED: RIGHT MEDIAL INFERIOR CHEST
LOCATION DETAILED: RIGHT CENTRAL TEMPLE
LOCATION DETAILED: LEFT POSTERIOR SHOULDER
LOCATION DETAILED: LEFT INFERIOR UPPER BACK

## 2024-12-30 ASSESSMENT — LOCATION SIMPLE DESCRIPTION DERM
LOCATION SIMPLE: ABDOMEN
LOCATION SIMPLE: LEFT CHEEK
LOCATION SIMPLE: LEFT SHOULDER
LOCATION SIMPLE: RIGHT UPPER BACK
LOCATION SIMPLE: LEFT LOWER BACK
LOCATION SIMPLE: CHEST
LOCATION SIMPLE: LEFT UPPER BACK
LOCATION SIMPLE: RIGHT TEMPLE
LOCATION SIMPLE: RIGHT LOWER BACK

## 2024-12-30 ASSESSMENT — LOCATION ZONE DERM
LOCATION ZONE: FACE
LOCATION ZONE: TRUNK
LOCATION ZONE: ARM

## 2024-12-30 NOTE — PROCEDURE: LIQUID NITROGEN
Post-Care Instructions: I reviewed with the patient in detail post-care instructions. Patient is to wear sunprotection, and avoid picking at any of the treated lesions. Pt may apply Vaseline to crusted or scabbing areas.
Medical Necessity Clause: This procedure was medically necessary because the lesions that were treated were:
Include Z78.9 (Other Specified Conditions Influencing Health Status) As An Associated Diagnosis?: No
Consent: The patient's consent was obtained including but not limited to risks of crusting, scabbing, blistering, scarring, darker or lighter pigmentary change, recurrence, incomplete removal and infection.
Medical Necessity Information: It is in your best interest to select a reason for this procedure from the list below. All of these items fulfill various CMS LCD requirements except the new and changing color options.
Detail Level: Detailed
Show Aperture Variable?: Yes
Spray Paint Text: The liquid nitrogen was applied to the skin utilizing a spray paint frosting technique.

## 2024-12-30 NOTE — HPI: PREVENTATIVE SKIN CHECK
What Is The Reason For Today's Visit?: Preventative Skin Check
Additional History: 6 month check no complaints

## 2025-01-02 ENCOUNTER — PATIENT MESSAGE (OUTPATIENT)
Dept: TRANSPLANT | Facility: CLINIC | Age: 70
End: 2025-01-02
Payer: MEDICARE

## 2025-01-02 ENCOUNTER — ANTI-COAG VISIT (OUTPATIENT)
Dept: CARDIOLOGY | Facility: CLINIC | Age: 70
End: 2025-01-02
Payer: MEDICARE

## 2025-01-02 DIAGNOSIS — Z95.811 LVAD (LEFT VENTRICULAR ASSIST DEVICE) PRESENT: Primary | ICD-10-CM

## 2025-01-02 LAB — INR PPP: 1.7

## 2025-01-02 PROCEDURE — 93793 ANTICOAG MGMT PT WARFARIN: CPT | Mod: S$GLB,,,

## 2025-01-02 NOTE — PROGRESS NOTES
[facility]    2025 3:17 PM    Assessment/Plan:    Patient presents today with therapeutic INR.    Assessment of patient findings and chart review: no significant findings     Recommendation for patient's warfarin regimen: Continue current maintenance dose    Recommend repeat INR in 1 week  _________________________________________________________________    Rocco ESTRADA Román (69 y.o.) is followed by the [facility].    Anticoagulation Summary  As of 2025      INR goal:  1.5-2.0   TTR:  57.7% (3.7 y)   INR used for dosin.7 (2025)   Warfarin maintenance plan:  5 mg (5 mg x 1) every Mon; 2.5 mg (5 mg x 0.5) all other days   Weekly warfarin total:  20 mg   Plan last modified:  Cherly Barahona, PharmD (10/21/2024)   Next INR check:  2025   Target end date:  --    Indications    LVAD (left ventricular assist device) present [Z95.811]                 Anticoagulation Episode Summary       INR check location:  --    Preferred lab:  --    Send INR reminders to:  TERI COUMADIN LVAD    Comments:  LVAD // (no INR) [facility]  [phone]  //[facility] ph [phone]/ results Lab - [phone]          Anticoagulation Care Providers       Provider Role Specialty Phone number    Pete Garnett MD [facility] Cardiology [phone]

## 2025-01-08 ENCOUNTER — PATIENT MESSAGE (OUTPATIENT)
Dept: TRANSPLANT | Facility: CLINIC | Age: 70
End: 2025-01-08
Payer: MEDICARE

## 2025-01-09 ENCOUNTER — ANTI-COAG VISIT (OUTPATIENT)
Dept: CARDIOLOGY | Facility: CLINIC | Age: 70
End: 2025-01-09
Payer: MEDICARE

## 2025-01-09 ENCOUNTER — CLINICAL SUPPORT (OUTPATIENT)
Dept: TRANSPLANT | Facility: CLINIC | Age: 70
End: 2025-01-09
Payer: MEDICARE

## 2025-01-09 ENCOUNTER — LAB VISIT (OUTPATIENT)
Dept: LAB | Facility: HOSPITAL | Age: 70
End: 2025-01-09
Payer: MEDICARE

## 2025-01-09 ENCOUNTER — DOCUMENTATION ONLY (OUTPATIENT)
Dept: CARDIOTHORACIC SURGERY | Facility: CLINIC | Age: 70
End: 2025-01-09
Payer: MEDICARE

## 2025-01-09 ENCOUNTER — OFFICE VISIT (OUTPATIENT)
Dept: TRANSPLANT | Facility: CLINIC | Age: 70
End: 2025-01-09
Attending: INTERNAL MEDICINE
Payer: MEDICARE

## 2025-01-09 VITALS
BODY MASS INDEX: 24.26 KG/M2 | WEIGHT: 189 LBS | HEIGHT: 74 IN | HEART RATE: 87 BPM | TEMPERATURE: 98 F | SYSTOLIC BLOOD PRESSURE: 106 MMHG

## 2025-01-09 DIAGNOSIS — Z95.810 ICD (IMPLANTABLE CARDIOVERTER-DEFIBRILLATOR) IN PLACE: ICD-10-CM

## 2025-01-09 DIAGNOSIS — Z95.811 LVAD (LEFT VENTRICULAR ASSIST DEVICE) PRESENT: Primary | ICD-10-CM

## 2025-01-09 DIAGNOSIS — I10 ESSENTIAL HYPERTENSION: ICD-10-CM

## 2025-01-09 DIAGNOSIS — Z95.811 HEART REPLACED BY HEART ASSIST DEVICE: Primary | ICD-10-CM

## 2025-01-09 DIAGNOSIS — Z95.811 HEART REPLACED BY HEART ASSIST DEVICE: ICD-10-CM

## 2025-01-09 DIAGNOSIS — E78.5 HYPERLIPIDEMIA, UNSPECIFIED HYPERLIPIDEMIA TYPE: ICD-10-CM

## 2025-01-09 DIAGNOSIS — I50.42 CHRONIC COMBINED SYSTOLIC AND DIASTOLIC HEART FAILURE: ICD-10-CM

## 2025-01-09 DIAGNOSIS — Z95.811 LVAD (LEFT VENTRICULAR ASSIST DEVICE) PRESENT: ICD-10-CM

## 2025-01-09 LAB
ALBUMIN SERPL BCP-MCNC: 3 G/DL (ref 3.5–5.2)
ALP SERPL-CCNC: 212 U/L (ref 40–150)
ALT SERPL W/O P-5'-P-CCNC: 11 U/L (ref 10–44)
ANION GAP SERPL CALC-SCNC: 9 MMOL/L (ref 8–16)
AST SERPL-CCNC: 19 U/L (ref 10–40)
BASOPHILS # BLD AUTO: 0.04 K/UL (ref 0–0.2)
BASOPHILS NFR BLD: 0.5 % (ref 0–1.9)
BILIRUB DIRECT SERPL-MCNC: 1.1 MG/DL (ref 0.1–0.3)
BILIRUB SERPL-MCNC: 2.9 MG/DL (ref 0.1–1)
BNP SERPL-MCNC: 1141 PG/ML (ref 0–99)
BUN SERPL-MCNC: 17 MG/DL (ref 8–23)
CALCIUM SERPL-MCNC: 9.1 MG/DL (ref 8.7–10.5)
CHLORIDE SERPL-SCNC: 104 MMOL/L (ref 95–110)
CHOLEST SERPL-MCNC: 98 MG/DL (ref 120–199)
CHOLEST/HDLC SERPL: 2.2 {RATIO} (ref 2–5)
CO2 SERPL-SCNC: 24 MMOL/L (ref 23–29)
CREAT SERPL-MCNC: 1.1 MG/DL (ref 0.5–1.4)
CRP SERPL-MCNC: 29.2 MG/L (ref 0–8.2)
DIFFERENTIAL METHOD BLD: ABNORMAL
EOSINOPHIL # BLD AUTO: 0.2 K/UL (ref 0–0.5)
EOSINOPHIL NFR BLD: 1.9 % (ref 0–8)
ERYTHROCYTE [DISTWIDTH] IN BLOOD BY AUTOMATED COUNT: 17.3 % (ref 11.5–14.5)
EST. GFR  (NO RACE VARIABLE): >60 ML/MIN/1.73 M^2
GLUCOSE SERPL-MCNC: 114 MG/DL (ref 70–110)
HCT VFR BLD AUTO: 37 % (ref 40–54)
HDLC SERPL-MCNC: 44 MG/DL (ref 40–75)
HDLC SERPL: 44.9 % (ref 20–50)
HGB BLD-MCNC: 11.3 G/DL (ref 14–18)
IMM GRANULOCYTES # BLD AUTO: 0.03 K/UL (ref 0–0.04)
IMM GRANULOCYTES NFR BLD AUTO: 0.4 % (ref 0–0.5)
INR PPP: 2.4 (ref 0.8–1.2)
LDH SERPL L TO P-CCNC: 295 U/L (ref 110–260)
LDLC SERPL CALC-MCNC: 41 MG/DL (ref 63–159)
LYMPHOCYTES # BLD AUTO: 1.1 K/UL (ref 1–4.8)
LYMPHOCYTES NFR BLD: 13.9 % (ref 18–48)
MAGNESIUM SERPL-MCNC: 1.9 MG/DL (ref 1.6–2.6)
MCH RBC QN AUTO: 28.2 PG (ref 27–31)
MCHC RBC AUTO-ENTMCNC: 30.5 G/DL (ref 32–36)
MCV RBC AUTO: 92 FL (ref 82–98)
MONOCYTES # BLD AUTO: 0.9 K/UL (ref 0.3–1)
MONOCYTES NFR BLD: 11.7 % (ref 4–15)
NEUTROPHILS # BLD AUTO: 5.8 K/UL (ref 1.8–7.7)
NEUTROPHILS NFR BLD: 71.6 % (ref 38–73)
NONHDLC SERPL-MCNC: 54 MG/DL
NRBC BLD-RTO: 0 /100 WBC
PHOSPHATE SERPL-MCNC: 2.8 MG/DL (ref 2.7–4.5)
PLATELET # BLD AUTO: 213 K/UL (ref 150–450)
PMV BLD AUTO: 9.4 FL (ref 9.2–12.9)
POTASSIUM SERPL-SCNC: 3.9 MMOL/L (ref 3.5–5.1)
PREALB SERPL-MCNC: 7 MG/DL (ref 20–43)
PROT SERPL-MCNC: 10.4 G/DL (ref 6–8.4)
PROTHROMBIN TIME: 24.7 SEC (ref 9–12.5)
RBC # BLD AUTO: 4.01 M/UL (ref 4.6–6.2)
SODIUM SERPL-SCNC: 137 MMOL/L (ref 136–145)
TRIGL SERPL-MCNC: 65 MG/DL (ref 30–150)
WBC # BLD AUTO: 8.03 K/UL (ref 3.9–12.7)

## 2025-01-09 PROCEDURE — 3288F FALL RISK ASSESSMENT DOCD: CPT | Mod: CPTII,S$GLB,, | Performed by: INTERNAL MEDICINE

## 2025-01-09 PROCEDURE — 85610 PROTHROMBIN TIME: CPT | Performed by: INTERNAL MEDICINE

## 2025-01-09 PROCEDURE — 84100 ASSAY OF PHOSPHORUS: CPT | Performed by: INTERNAL MEDICINE

## 2025-01-09 PROCEDURE — 36415 COLL VENOUS BLD VENIPUNCTURE: CPT | Performed by: INTERNAL MEDICINE

## 2025-01-09 PROCEDURE — 84134 ASSAY OF PREALBUMIN: CPT | Performed by: INTERNAL MEDICINE

## 2025-01-09 PROCEDURE — 83880 ASSAY OF NATRIURETIC PEPTIDE: CPT | Performed by: INTERNAL MEDICINE

## 2025-01-09 PROCEDURE — 93750 INTERROGATION VAD IN PERSON: CPT | Mod: S$GLB,,, | Performed by: INTERNAL MEDICINE

## 2025-01-09 PROCEDURE — 1126F AMNT PAIN NOTED NONE PRSNT: CPT | Mod: CPTII,S$GLB,, | Performed by: INTERNAL MEDICINE

## 2025-01-09 PROCEDURE — 83615 LACTATE (LD) (LDH) ENZYME: CPT | Performed by: INTERNAL MEDICINE

## 2025-01-09 PROCEDURE — 86140 C-REACTIVE PROTEIN: CPT | Performed by: INTERNAL MEDICINE

## 2025-01-09 PROCEDURE — 99999 PR PBB SHADOW E&M-EST. PATIENT-LVL IV: CPT | Mod: PBBFAC,,, | Performed by: INTERNAL MEDICINE

## 2025-01-09 PROCEDURE — 1101F PT FALLS ASSESS-DOCD LE1/YR: CPT | Mod: CPTII,S$GLB,, | Performed by: INTERNAL MEDICINE

## 2025-01-09 PROCEDURE — 85025 COMPLETE CBC W/AUTO DIFF WBC: CPT | Performed by: INTERNAL MEDICINE

## 2025-01-09 PROCEDURE — 80061 LIPID PANEL: CPT | Performed by: INTERNAL MEDICINE

## 2025-01-09 PROCEDURE — 83735 ASSAY OF MAGNESIUM: CPT | Performed by: INTERNAL MEDICINE

## 2025-01-09 PROCEDURE — 80053 COMPREHEN METABOLIC PANEL: CPT | Performed by: INTERNAL MEDICINE

## 2025-01-09 PROCEDURE — 99214 OFFICE O/P EST MOD 30 MIN: CPT | Mod: S$GLB,,, | Performed by: INTERNAL MEDICINE

## 2025-01-09 PROCEDURE — 3008F BODY MASS INDEX DOCD: CPT | Mod: CPTII,S$GLB,, | Performed by: INTERNAL MEDICINE

## 2025-01-09 PROCEDURE — 82248 BILIRUBIN DIRECT: CPT | Performed by: INTERNAL MEDICINE

## 2025-01-09 RX ORDER — TORSEMIDE 20 MG/1
40 TABLET ORAL 2 TIMES DAILY
Qty: 120 TABLET | Refills: 11 | Status: SHIPPED | OUTPATIENT
Start: 2025-01-09 | End: 2026-01-09

## 2025-01-09 RX ORDER — WARFARIN SODIUM 5 MG/1
2.5-5 TABLET ORAL DAILY
Qty: 35 TABLET | Refills: 10 | Status: SHIPPED | OUTPATIENT
Start: 2025-01-09 | End: 2026-01-29

## 2025-01-09 RX ORDER — FERROUS GLUCONATE 324(38)MG
1 TABLET ORAL EVERY MORNING
COMMUNITY
Start: 2024-11-27

## 2025-01-09 RX ORDER — POTASSIUM CHLORIDE 20 MEQ/1
20 TABLET, EXTENDED RELEASE ORAL 2 TIMES DAILY
Qty: 60 TABLET | Refills: 5 | Status: SHIPPED | OUTPATIENT
Start: 2025-01-09

## 2025-01-09 NOTE — PROGRESS NOTES
Subjective:   Patient ID:  Rocco France is a 69 y.o. male who presents for LVAD followup visit.    Implant Date: 1/13/2021  Initials: [facility]     Heartmate 3 RPM 5200     INR goal: 2-2.5  Bridge with Heparin   Antiplatelets: none        1/9/2025     9:21 AM   TXP JACEY INTERROGATIONS   Type HeartMate3   Flow 3.9   Speed 5200   PI 7   Power (Edwards) 3.7   LSL 4800   Pulsatility Intermittent pulse   Interrogation of Ventricular assist device was performed with physician analysis of device parameters and review of device function. I have personally reviewed the interrogation findings and agree with findings as stated.     HPI  68 yo BM with stage D CHF due to NICM underwent HM3 implantation 1/13/2021 with sternal closure 1/14/2021.  He was admitted for syncope 1/27/2022 at which time he was found to have an evolving right cerebral acute subdural hematoma and acute basal cistern subarachnoid hemorrhage. He requiring intubation with prolonged hospitalization and was incidentally found to be positive for COVID-19 and treated with remdesivir. He was taken off of aspirin and discharged home 2/17/2022. He was readmitted 3/2022 for 3 days due to dysequilibrium, diplopia and repeat CT head was stable. Neurology recommended myasthenia gravis workup which was negative.     He has had elevated BP at last 2 visits.  He has not been taking his meds on mornings of visits so attributed to this.  Today doppler left 98 and right 106 with intermittent pulsatility and calc MAP 91.    Home weight 187-189# is stable.    Review of Systems   Constitutional: Positive for malaise/fatigue. Negative for weight gain and weight loss.   Cardiovascular:  Positive for dyspnea on exertion and leg swelling. Negative for chest pain, orthopnea, palpitations, paroxysmal nocturnal dyspnea and syncope.   Hematologic/Lymphatic: Does not bruise/bleed easily.   Gastrointestinal:  Negative for hematochezia and melena.   Genitourinary:  Negative for hematuria.  "  Neurological:  Positive for weakness. Negative for dizziness and light-headedness.     Objective:   Blood pressure (!) 106/0, pulse 87, temperature 97.8 °F (36.6 °C), temperature source Oral, height 6' 2" (1.88 m), weight 85.7 kg (189 lb).body mass index is 24.27 kg/m².  /81 (calc MAP 91)  Doppler:  98 on left, 106 on right but intermittently pulsatile  Physical Exam  Constitutional:       General: He is not in acute distress.     Appearance: He is well-developed. He is not ill-appearing, toxic-appearing or diaphoretic.      Comments: Blood pressure doppler 106 on right, 98 on left; , pulse 87, temperature 97.8 °F (36.6 °C), temperature source Oral, height 6' 2" (1.88 m), weight 85.7 kg (189 lb).body mass index is 24.27 kg/m².  Intermittently pulsatile with /81 (calc MAP 91)   HENT:      Head: Normocephalic and atraumatic.   Eyes:      General: No scleral icterus.        Right eye: No discharge.         Left eye: No discharge.      Conjunctiva/sclera: Conjunctivae normal.   Neck:      Thyroid: No thyromegaly.      Vascular: JVD (14 cm with prom venous pulsation) present.      Trachea: No tracheal deviation.   Cardiovascular:      Comments: Normal LVAD sounds; DL *  Pulmonary:      Effort: Pulmonary effort is normal. No respiratory distress.      Breath sounds: Normal breath sounds. No wheezing or rales.   Abdominal:      General: Bowel sounds are normal. There is no distension.      Palpations: Abdomen is soft. There is no mass.      Tenderness: There is no abdominal tenderness. There is no guarding or rebound.      Comments: Hard to determine liver span   Musculoskeletal:         General: Swelling (just above knee 1 + and lower legs 2-3+ bilaterally) present. No tenderness.      Right lower leg: Edema present.      Left lower leg: Edema present.   Skin:     General: Skin is warm and dry.   Neurological:      General: No focal deficit present.      Mental Status: He is alert. Mental status is at " baseline.   Psychiatric:         Mood and Affect: Mood normal.         Behavior: Behavior normal.         Thought Content: Thought content normal.         Judgment: Judgment normal.       Lab Results   Component Value Date    BNP 1,141 (H) 01/09/2025    BNP 1,308 (H) 10/10/2024    BNP 1,039 (H) 06/12/2024     Lab Results   Component Value Date    BNP 1,141 (H) 01/09/2025     01/09/2025    K 3.9 01/09/2025    MG 1.9 01/09/2025     01/09/2025    CO2 24 01/09/2025    PHOS 2.8 01/09/2025    BUN 17 01/09/2025    CREATININE 1.1 01/09/2025     (H) 01/09/2025    HGBA1C 6.6 (H) 05/19/2023    AST 19 01/09/2025    ALT 11 01/09/2025    ALBUMIN 3.0 (L) 01/09/2025    PROT 10.4 (H) 01/09/2025    BILITOT 2.9 (H) 01/09/2025    WBC 8.03 01/09/2025    HGB 11.3 (L) 01/09/2025    HCT 37.0 (L) 01/09/2025    HCT 30 (L) 03/22/2022     01/09/2025    INR 2.4 (H) 01/09/2025    INR 1.7 01/02/2025     (H) 01/09/2025    TSH 0.906 05/18/2023    CHOL 98 (L) 01/09/2025    HDL 44 01/09/2025    LDLCALC 41.0 (L) 01/09/2025    TRIG 65 01/09/2025           Assessment:      1. Heart replaced by heart assist device    2. LVAD (left ventricular assist device) present    3. Chronic combined systolic and diastolic heart failure    4. Essential hypertension    5. ICD (implantable cardioverter-defibrillator) in place        Plan:   Increase torsemide to 40 mg BID and start KCL 20 meq twice a day    BMP and BNP with weekly INR x 4 weeks    Careful reacting to BP with prior issues of hypotension, falls, subdural     Take all meds before every visit--will not interfere with lab    I asked him to inform nurses of his weights every week x 4 weeks--I anticipate weight loss of 5-10# when all volume removed    Supplies ordered are medically necessary for care of LVAD and DL    Post LVAD goals of care are to feel stronger and be more active, control HF and avoid admission.    Antiplatelet regimen--none    INR 2-3 with  warfarin    Control BP    GDMT reviewed     Patient is now NYHA II    Recommend 2 gram sodium restriction and 1500cc fluid restriction.    Encourage physical activity with graded exercise program.  Requested patient to weigh themselves daily, and to notify us if their weight increases by more than 3 lbs in 1 day or 5 lbs in 1 week.     Listed for transplant: No    UNOS Patient Status  Functional Status: 80% - Normal activity with effort: some symptoms of disease  Physical Capacity: No Limitations  Working for Income: No  If no, reason not working: Disability

## 2025-01-09 NOTE — PROGRESS NOTES
1/9- Pt questioned and confirmed correct dose. Pt may have taken an extra dose but is unsure of what day. Pt drank a beer on 1/7 and denies all other changes.

## 2025-01-09 NOTE — PATIENT INSTRUCTIONS
Take all meds before every visit--will not interfere with lab    Increase torsemide to 40 mg (2 of the 20 mg tabs) twice a day    Start KCL 20 meq twice a day    I will check BMP and BNP with weekly with your INR x 4 weeks and please inform nurses of your weights every week x 4 weeks

## 2025-01-09 NOTE — PROGRESS NOTES
[facility]    2025 1:40 PM    Assessment/Plan:    Patient presents today with supratherapeutic INR.    Assessment of patient findings and chart review: May have taken extra dose but is unsure; also reports 1 beer on     Recommendation for patient's warfarin regimen: Hold dose today then resume current maintenance dose    Recommend repeat INR Monday  _________________________________________________________________    Rocco ESTRADA Román (69 y.o.) is followed by the [facility].    Anticoagulation Summary  As of 2025      INR goal:  1.5-2.0   TTR:  57.6% (3.8 y)   INR used for dosin.4 (2025)   Warfarin maintenance plan:  5 mg (5 mg x 1) every Mon; 2.5 mg (5 mg x 0.5) all other days   Weekly warfarin total:  20 mg   Plan last modified:  Cheryl Barahona, PharmD (10/21/2024)   Next INR check:  2025   Target end date:  --    Indications    LVAD (left ventricular assist device) present [Z95.811]                 Anticoagulation Episode Summary       INR check location:  --    Preferred lab:  --    Send INR reminders to:  [facility] COUMADIN LVAD    Comments:  LVAD // (no INR) [facility]  [phone]  //[facility] ph [phone]/ results Lab - [phone]          Anticoagulation Care Providers       Provider Role Specialty Phone number    Pete Garnett MD [facility] Cardiology [phone]

## 2025-01-09 NOTE — PROGRESS NOTES
Pt presented for 48 month follow-up and verbalized consent and willingness to continue to participate with the INTERMACS registry.      Patient completed the EQ-5D quality of life questionnaire, KCCQ, and the Trail Making neurocognitive test in 145 seconds.

## 2025-01-09 NOTE — PROGRESS NOTES
Date of Implant with Heartmate 3 LVAD: 1/13/2021    PATIENT ARRIVED IN CLINIC:  Ambulatory   Accompanied by: self  Complaints/reason for visit today: routine    Vitals  Temperature, oral:   Temp Readings from Last 1 Encounters:   01/09/25 97.8 °F (36.6 °C) (Oral)     Blood Pressure:   BP Readings from Last 3 Encounters:   01/09/25 (!) 106/0   10/10/24 (!) 100/0   06/12/24 (!) 92/0        VAD Interrogation:      1/9/2025     9:21 AM 10/10/2024     8:52 AM 6/12/2024     9:35 AM   TXP JACEY INTERROGATIONS   Type HeartMate3 HeartMate3 HeartMate3   Flow 3.9 3.8 3.6   Speed 5200 5200 5200   PI 7 8.2 7.6   Power (Edwards) 3.7 3.6 3.5   LSL 4800 4800 4800   Pulsatility Intermittent pulse No Pulse No Pulse     HCT:   Lab Results   Component Value Date    HCT 37.0 (L) 01/09/2025    HCT 30 (L) 03/22/2022       VAD Assessment  Problems / Issues /Equipment Needs/ Alarms with VAD if any: None noted  VAD Sounds: HM3 Smooth  Emergency Equipment With Patient: Yes   Equipment Needs: Yes ,rescue tape applied to driveline  It is medically necessary to ensure patient has properly functioning equipment and wearables to prevent infection, injury or death to patient.     DLES Assessment and Dressing Care:  Appearance of DLES: 1  Antibiotics: NO  Velour: No  Manual & Visual Inspection Of Driveline: Tear, rescue tape applied  Stabilization Device In Use: yes, brad securement device    Heartmate 3 Module Cable:  No yellow exposed and Attempted to unscrew modular cable to ensure it will be able to come lose in the event we ever need to change the modular cable while patient held the driveline in place so it would not move. Modular cable connection able to be unscrewed and re-tightened. Instructed pt to perform this weekly.  Frequency of Dressing Changes: daily & daily kit   Pt In Need Of Management Kits?:yes -   1 Box of daily kit  It is medically necessary to have VAD management kits in order to prevent infection or to assist in the healing of  an infected DLES.      Assessment/ Quality of Life Survery:   Complaints Of Nausea / Vomiting: None noted    Appearance and Frequency Of Stools: normal and formed without blood & daily  Color Of Urine: clear/yellow  Pain: NO  Coping/Depression/Anxiety: coping okay  Sleep Habits: 5-6 hrs /night  Sleep Aids:  remeron  Showering: Yes, reminded to change dressing immediately after drying off  Self- Care I have some problems washing or dressing myself  Mobility I have some problems in walking about  Usual Activities I have some problems with performing my usual activities  Activity/Exercise: going to [employer]   Driving: Yes. Reminded to pull over should there be an alarm before looking down at controller.  Additional Comments: n/a    Labs:    Chemistry        Component Value Date/Time     01/09/2025 0750    K 3.9 01/09/2025 0750     01/09/2025 0750    CO2 24 01/09/2025 0750    BUN 17 01/09/2025 0750    CREATININE 1.1 01/09/2025 0750     (H) 01/09/2025 0750        Component Value Date/Time    CALCIUM 9.1 01/09/2025 0750    ALKPHOS 212 (H) 01/09/2025 0750    AST 19 01/09/2025 0750    ALT 11 01/09/2025 0750    BILITOT 2.9 (H) 01/09/2025 0750    ESTGFRAFRICA 55.3 (A) 07/13/2022 0834    EGFRNONAA 47.8 (A) 07/13/2022 0834            Magnesium   Date Value Ref Range Status   01/09/2025 1.9 1.6 - 2.6 mg/dL Final       Lab Results   Component Value Date    WBC 8.03 01/09/2025    HGB 11.3 (L) 01/09/2025    HCT 37.0 (L) 01/09/2025    MCV 92 01/09/2025     01/09/2025       Lab Results   Component Value Date    INR 2.4 (H) 01/09/2025    INR 1.7 01/02/2025    INR 1.9 12/26/2024       BNP   Date Value Ref Range Status   01/09/2025 1,141 (H) 0 - 99 pg/mL Final     Comment:     Values of less than 100 pg/ml are consistent with non-CHF populations.   10/10/2024 1,308 (H) 0 - 99 pg/mL Final     Comment:     Values of less than 100 pg/ml are consistent with non-CHF populations.   06/12/2024 1,039 (H) 0 - 99 pg/mL  Final     Comment:     Values of less than 100 pg/ml are consistent with non-CHF populations.       LD   Date Value Ref Range Status   01/09/2025 295 (H) 110 - 260 U/L Final     Comment:     Results are increased in hemolyzed samples.   10/10/2024 248 110 - 260 U/L Final     Comment:     Results are increased in hemolyzed samples.   06/12/2024 248 110 - 260 U/L Final     Comment:     Results are increased in hemolyzed samples.       Labs reviewed with patient: YES     Medication Reconciliation: per MA.  New Medication Detail Provided: yes  Coumadin Managed by: [facility]    Education: Reviewed driveline care, emergency procedures, how to change the controller, alarms with patient, as well as discussed how to page the VAD coordinator in case of an emergency.   Educated patient/family that chest compressions are allowed in the event they are needed.    Reminded patient/caregiver not to touch their face and to cover their mouth when they cough or sneeze.   Vaccines: Pt informed that we are encouraging all VAD patients to receive  vaccines and boosters. Informed pt that they can take tylenol but should avoid other NSAIDs.       Plans/Needs: Routine f/u w/ Dr Ramos. Patient reports feeling well. Doppler elevated. States he did not take his medications this morning. Noted the same at last clinic visit. Patient states he wasn't sure if he is allowed to eat or drink or take his meds before getting lab work. Explained that if he ever needs to fast for VAD appointments we will notify him, otherwise he should take his morning medications as usual. Patient verbalized understanding. Patient noted to have increased volume. See MD note for medication changes.     RTC 3 months       Hurricane Season: No

## 2025-01-09 NOTE — PROCEDURES
1/9/2025     9:21 AM 10/10/2024     8:52 AM 6/12/2024     9:35 AM 3/7/2024     9:32 AM 12/7/2023    11:13 AM 10/4/2023     9:38 AM 7/6/2023     9:06 AM   TXP JACEY INTERROGATIONS   Type HeartMate3 HeartMate3 HeartMate3 HeartMate3 HeartMate3 HeartMate3 HeartMate3   Flow 3.9 3.8 3.6 4.3 4.5 4 3.7   Speed 5200 5200 5200 5200 5200 5200 5200   PI 7 8.2 7.6 6.4 5.7 8.3 8.9   Power (Edwards) 3.7 3.6 3.5 3.8 3.8 3.7 3.6   LSL 4800 4800 4800 4800 4800 4800 4800   Pulsatility Intermittent pulse No Pulse No Pulse Pulse Pulse Pulse Intermittent pulse   }Interrogation of Ventricular assist device was performed with physician analysis of device parameters and review of device function. I have personally reviewed the interrogation findings and agree with findings as stated.

## 2025-01-09 NOTE — LETTER
January 9, 2025        Teresa Mendoza  [address] [facility]  [address]  [facility] [address]  Phone: [phone]  Fax: [phone]             [facility]  [address]  [place] [address]  Phone: [phone]   Patient: Rocco France   MR Number: [id number]   YOB: 1955   Date of Visit: 1/9/2025       Dear Dr. Teresa Mendoza    Thank you for referring Rocco France to me for evaluation. Attached you will find relevant portions of my assessment and plan of care.    If you have questions, please do not hesitate to call me. I look forward to following Rocco France along with you.    Sincerely,    Manuel Ramos Jr, MD    Enclosure    If you would like to receive this communication electronically, please contact [email or web address] or [phone] to request [place] [facility] access.    [place] [facility] is a tool which provides read-only access to select patient information with whom you have a relationship. Its easy to use and provides real time access to review your patients record including encounter summaries, notes, results, and demographic information.    If you feel you have received this communication in error or would no longer like to receive these types of communications, please e-mail [email or web address]

## 2025-01-10 ENCOUNTER — TELEPHONE (OUTPATIENT)
Dept: TRANSPLANT | Facility: CLINIC | Age: 70
End: 2025-01-10
Payer: MEDICARE

## 2025-01-10 NOTE — TELEPHONE ENCOUNTER
Faxed new lab orders to [facility]. Confirmed with lab that they received the new orders.

## 2025-01-10 NOTE — LETTER
This communication is flagged as high priority.        Date Sent:  01/10/2025      Rocco France  1955      This patient will require the following lab(s). The patient will be instructed to report to your facility for their lab to be drawn.  Please fax all results to [phone]. INR results to be faxed to [phone]    LABS ORDERED   ICD 10    DATE REQUESTED:  Complete Metabolic Panel                ICD Z95.811                                        Weekly x's 4 weeks beginning week of 1/13/2025 w/ PT/INR   BNP Level                                        ICD Z95.811 / I50.9                             Weekly x's 4 weeks beginning week of 1/13/2025 w/ PT/INR          BRODIE DavisN, RN, CCRN, VAD-C [phone]   BRODIE ThomasN, RN, CV--[phone]   Mariela Barrow, MSN, RN, CV--[phone]   Kacey Barr [phone]       Fax all results to [phone].  Fax INR results to [phone].  Sincerely,      Deana Lake M.D.  Medical Director, Mechanical Circulatory Device Support Program  Section of Cardiomyopathy & Heart Transplantation

## 2025-01-13 ENCOUNTER — ANTI-COAG VISIT (OUTPATIENT)
Dept: CARDIOLOGY | Facility: CLINIC | Age: 70
End: 2025-01-13
Payer: MEDICARE

## 2025-01-13 ENCOUNTER — TELEPHONE (OUTPATIENT)
Dept: TRANSPLANT | Facility: CLINIC | Age: 70
End: 2025-01-13
Payer: MEDICARE

## 2025-01-13 DIAGNOSIS — Z95.811 LVAD (LEFT VENTRICULAR ASSIST DEVICE) PRESENT: Primary | ICD-10-CM

## 2025-01-13 LAB
EXT ALBUMIN: 2.7
EXT ALT: 9
EXT AST: 26
EXT BILIRUBIN TOTAL: 2.6
EXT BUN: 26
EXT CALCIUM: 9.2
EXT CHLORIDE: 99
EXT CREATININE: 1.7 MG/DL
EXT GLUCOSE: 228
EXT NT PROBNP: 4102
EXT POTASSIUM: 4.1
EXT PROTEIN TOTAL: 9.9
EXT SODIUM: 137 MMOL/L
INR PPP: 1.4

## 2025-01-13 PROCEDURE — 93793 ANTICOAG MGMT PT WARFARIN: CPT | Mod: S$GLB,,,

## 2025-01-13 NOTE — TELEPHONE ENCOUNTER
Labs received and reviewed. Called pt to discuss. He is not available at this moment, his wife will have him call me back soon.   He was seen in clinic 1/9/25, changes were made at that time. Increase torsemide to 40 mg BID and start KCL 20 meq twice a day     1/15/24 0830: Pt has not returned call so called him back. He confirmed he made the change last week after clinic.  He denies SOB, denies feeling swollen. He is not able to weigh himself due to his scale being broke.  His creatinine is elevated from 1.1 to 1.7 and he is not sleeping at night due to urinating all night. NTpro BNP is 4102. Message sent to Dr. Ramos to review.

## 2025-01-13 NOTE — PROGRESS NOTES
[facility]    2025 10:03 AM    Assessment/Plan:    Patient presents today with therapeutic INR.    Assessment of patient findings and chart review: no significant findings     Recommendation for patient's warfarin regimen: Continue current maintenance dose    Recommend repeat INR Thursday  _________________________________________________________________    Rocco ESTRADA Román (69 y.o.) is followed by the [facility].    Anticoagulation Summary  As of 2025      INR goal:  1.5-2.0   TTR:  57.6% (3.8 y)   INR used for dosin.4 (2025)   Warfarin maintenance plan:  5 mg (5 mg x 1) every Mon; 2.5 mg (5 mg x 0.5) all other days   Weekly warfarin total:  20 mg   Plan last modified:  Cheryl Barahona, PharmD (10/21/2024)   Next INR check:  2025   Target end date:  --    Indications    LVAD (left ventricular assist device) present [Z95.811]                 Anticoagulation Episode Summary       INR check location:  --    Preferred lab:  --    Send INR reminders to:  [facility] COUMADIN LVAD    Comments:  LVAD // (no INR) [facility]  [phone]  //[facility] ph [phone]/ results Lab - [phone]          Anticoagulation Care Providers       Provider Role Specialty Phone number    Pete Garnett MD [facility] Cardiology [phone]

## 2025-01-16 ENCOUNTER — ANTI-COAG VISIT (OUTPATIENT)
Dept: CARDIOLOGY | Facility: CLINIC | Age: 70
End: 2025-01-16
Payer: MEDICARE

## 2025-01-16 DIAGNOSIS — Z95.811 LVAD (LEFT VENTRICULAR ASSIST DEVICE) PRESENT: Primary | ICD-10-CM

## 2025-01-16 LAB — INR PPP: 1.3

## 2025-01-16 PROCEDURE — 93793 ANTICOAG MGMT PT WARFARIN: CPT | Mod: S$GLB,,,

## 2025-01-16 NOTE — PROGRESS NOTES
[facility]    2025 3:58 PM    Assessment/Plan:    Patient presents today with subtherapeutic  INR.    Assessment of patient findings and chart review: dose was held last week for supratherapeutic INR    Recommendation for patient's warfarin regimen: Continue current maintenance dose    Recommend repeat INR Tuesday  _________________________________________________________________    Rocco France (69 y.o.) is followed by the [facility].    Anticoagulation Summary  As of 2025      INR goal:  1.5-2.0   TTR:  57.4% (3.8 y)   INR used for dosin.3 (2025)   Warfarin maintenance plan:  5 mg (5 mg x 1) every Mon; 2.5 mg (5 mg x 0.5) all other days   Weekly warfarin total:  20 mg   Plan last modified:  Cheryl Barahona, PharmD (10/21/2024)   Next INR check:  2025   Target end date:  --    Indications    LVAD (left ventricular assist device) present [Z95.811]                 Anticoagulation Episode Summary       INR check location:  --    Preferred lab:  --    Send INR reminders to:  [facility] COUMADIN LVAD    Comments:  LVAD // (no INR) [facility]  [phone]  //[facility] ph [phone]/ results Lab - [phone]          Anticoagulation Care Providers       Provider Role Specialty Phone number    Pete Garnett MD [facility] Cardiology [phone]

## 2025-01-16 NOTE — PROGRESS NOTES
Leora at [facility] Lab gave verbal read as INR 1.3, PT 13.2, dated for 01/16/2025, hard copy to be faxed.   Patient reports correct Warfarin dose, no changes reported.

## 2025-01-21 NOTE — PROGRESS NOTES
01/21/2025-Spoke to pt regarding today's INR. Pt stated he couldn't go to lab due to weather and will go for INR 1/23/25.

## 2025-01-23 NOTE — PROGRESS NOTES
01/23/2025-Pt stated he couldn't go to lab for INR due to the weather and will for INR on 01/24/25 before noon.

## 2025-01-24 ENCOUNTER — DOCUMENTATION ONLY (OUTPATIENT)
Dept: TRANSPLANT | Facility: CLINIC | Age: 70
End: 2025-01-24
Payer: MEDICARE

## 2025-01-24 ENCOUNTER — ANTI-COAG VISIT (OUTPATIENT)
Dept: CARDIOLOGY | Facility: CLINIC | Age: 70
End: 2025-01-24
Payer: MEDICARE

## 2025-01-24 DIAGNOSIS — Z95.811 LVAD (LEFT VENTRICULAR ASSIST DEVICE) PRESENT: Primary | ICD-10-CM

## 2025-01-24 LAB
EXT NT PROBNP: 3857
INR PPP: 1.3

## 2025-01-24 PROCEDURE — 93793 ANTICOAG MGMT PT WARFARIN: CPT | Mod: S$GLB,,,

## 2025-01-24 NOTE — PROGRESS NOTES
[facility]    2025 10:32 AM    Assessment/Plan:    Patient presents today with subtherapeutic  INR.    Assessment of patient findings and chart review: no significant findings     Recommendation for patient's warfarin regimen: Increase maintenance dose    Recommend repeat INR Tuesday  _________________________________________________________________    Rocco ESTRADA Román (69 y.o.) is followed by the [facility].    Anticoagulation Summary  As of 2025      INR goal:  1.5-2.0   TTR:  57.1% (3.8 y)   INR used for dosin.3 (2025)   Warfarin maintenance plan:  5 mg (5 mg x 1) every Mon, Fri; 2.5 mg (5 mg x 0.5) all other days   Weekly warfarin total:  22.5 mg   Plan last modified:  Cheryl Barahona, PharmD (2025)   Next INR check:  2025   Target end date:  --    Indications    LVAD (left ventricular assist device) present [Z95.811]                 Anticoagulation Episode Summary       INR check location:  --    Preferred lab:  --    Send INR reminders to:  [facility] COUMADIN LVAD    Comments:  LVAD // (no INR) [facility]  [phone]  //[facility] ph [phone]/ results Lab - [phone]          Anticoagulation Care Providers       Provider Role Specialty Phone number    Pete Garnett MD [facility] Cardiology [phone]

## 2025-01-24 NOTE — PROGRESS NOTES
External NT-PROBNP received, reviewed and entered into epic. Down from a week ago, no additional changes to be made.

## 2025-01-26 NOTE — ASSESSMENT & PLAN NOTE
-Initially thought to need Cardene, but he is now on low dose Levo to keep MAP > 65  -All home antihypertensives are on hold for now   Leyla Burton is a 29 year old female presenting to the walk-in clinic today alone for cut to right hand index finger. She reports that she feels like there is glass stuck in the wound, feels something sharp over her knuckle .    Per pt reservation note: \"Friday night, I accidentally slit my finger open while washing the dishes. One of my wine glasses busted open and cut my index finger. I believe there may still be glass there. I’m not for certain but I’d like to make sure before it starts to heal \".    Due for tetanus    Treatment tried prior to visit: none    Swabs/Specimens collected during rooming process:  None    Work, School or  note needed: Yes    New vs Established: Established    Patient would like communication of their results via:    [employer]    Cell Phone:   Telephone Information:   Mobile [phone]     Okay to leave a message containing results? Yes

## 2025-01-27 ENCOUNTER — DOCUMENTATION ONLY (OUTPATIENT)
Dept: TRANSPLANT | Facility: CLINIC | Age: 70
End: 2025-01-27
Payer: MEDICARE

## 2025-01-27 LAB
EXT ALBUMIN: 2.8
EXT ALT: 7
EXT AST: 20
EXT BILIRUBIN TOTAL: 2.4
EXT BUN: 27
EXT CALCIUM: 9.1
EXT CHLORIDE: 101
EXT CREATININE: 1.4 MG/DL
EXT GLUCOSE: 100
EXT POTASSIUM: 4.2
EXT PROTEIN TOTAL: 9.4
EXT SODIUM: 138 MMOL/L

## 2025-01-28 ENCOUNTER — DOCUMENTATION ONLY (OUTPATIENT)
Dept: TRANSPLANT | Facility: CLINIC | Age: 70
End: 2025-01-28
Payer: MEDICARE

## 2025-01-28 ENCOUNTER — ANTI-COAG VISIT (OUTPATIENT)
Dept: CARDIOLOGY | Facility: CLINIC | Age: 70
End: 2025-01-28
Payer: MEDICARE

## 2025-01-28 DIAGNOSIS — Z95.811 LVAD (LEFT VENTRICULAR ASSIST DEVICE) PRESENT: Primary | ICD-10-CM

## 2025-01-28 LAB
EXT ALBUMIN: 2.9
EXT ALT: 6
EXT AST: 22
EXT BILIRUBIN TOTAL: 1.4
EXT BUN: 23
EXT CALCIUM: 8.9
EXT CHLORIDE: 102
EXT CREATININE: 1.4 MG/DL
EXT GLUCOSE: 164
EXT NT PROBNP: 5886
EXT POTASSIUM: 4
EXT PROTEIN TOTAL: 9.8
EXT SODIUM: 139 MMOL/L
INR PPP: 1.3

## 2025-01-28 PROCEDURE — 93793 ANTICOAG MGMT PT WARFARIN: CPT | Mod: S$GLB,,,

## 2025-01-28 NOTE — PROGRESS NOTES
[facility]    2025 10:37 AM    Assessment/Plan:    Patient presents today with subtherapeutic  INR.    Assessment of patient findings and chart review: no significant findings     Recommendation for patient's warfarin regimen: Boost dose today to 5mg then resume current maintenance dose    Recommend repeat INR Thursday  _________________________________________________________________    Rocco ESTRADA Román (69 y.o.) is followed by the [employer] [facility].    Anticoagulation Summary  As of 2025      INR goal:  1.5-2.0   TTR:  57.0% (3.8 y)   INR used for dosin.3 (2025)   Warfarin maintenance plan:  5 mg (5 mg x 1) every Mon, Fri; 2.5 mg (5 mg x 0.5) all other days   Weekly warfarin total:  22.5 mg   Plan last modified:  Cheryl Barahona, PharmD (2025)   Next INR check:  2025   Target end date:  --    Indications    LVAD (left ventricular assist device) present [Z95.811]                 Anticoagulation Episode Summary       INR check location:  --    Preferred lab:  --    Send INR reminders to:  [facility] COUMADIN LVAD    Comments:  LVAD // (no INR) [facility]  [phone]  //[facility] ph [phone]/ results Lab - [phone]          Anticoagulation Care Providers       Provider Role Specialty Phone number    Pete Garnett MD [facility] Cardiology [phone]

## 2025-01-28 NOTE — PROGRESS NOTES
Patient reports correct Warfarin dose, bloody mucus when blowing nose, no other changes reported.

## 2025-01-28 NOTE — TELEPHONE ENCOUNTER
Contacted all numbers from message about medications and BP. BP is safe range for patient no concerns expressed from pharmacy or patient. Will follow up as needed.    Detail Level: Detailed

## 2025-01-30 ENCOUNTER — ANTI-COAG VISIT (OUTPATIENT)
Dept: CARDIOLOGY | Facility: CLINIC | Age: 70
End: 2025-01-30
Payer: MEDICARE

## 2025-01-30 DIAGNOSIS — Z95.811 LVAD (LEFT VENTRICULAR ASSIST DEVICE) PRESENT: Primary | ICD-10-CM

## 2025-01-30 LAB — INR PPP: 1.6

## 2025-01-30 PROCEDURE — 93793 ANTICOAG MGMT PT WARFARIN: CPT | Mod: S$GLB,,,

## 2025-01-30 NOTE — PROGRESS NOTES
[facility]    2025 2:41 PM    Assessment/Plan:    Patient presents today with subtherapeutic  INR.    Assessment of patient findings and chart review: no significant findings    Recommendation for patient's warfarin regimen: Continue current maintenance dose    Recommend repeat INR Monday  _________________________________________________________________    Rocco ESTRADA Román (69 y.o.) is followed by the [facility].    Anticoagulation Summary  As of 2025      INR goal:  1.5-2.0   TTR:  56.9% (3.8 y)   INR used for dosin.6 (2025)   Warfarin maintenance plan:  5 mg (5 mg x 1) every Mon, Fri; 2.5 mg (5 mg x 0.5) all other days   Weekly warfarin total:  22.5 mg   Plan last modified:  Cheryl Barahona, PharmD (2025)   Next INR check:  2/3/2025   Target end date:  --    Indications    LVAD (left ventricular assist device) present [Z95.811]                 Anticoagulation Episode Summary       INR check location:  --    Preferred lab:  --    Send INR reminders to:  [facility] COUMADIN LVAD    Comments:  LVAD // (no INR) [facility]  [phone]  //[facility] ph [phone]/ results Lab - [phone]          Anticoagulation Care Providers       Provider Role Specialty Phone number    Pete Garnett MD [facility] Cardiology [phone]

## 2025-01-30 NOTE — PROGRESS NOTES
Deborah at [facility] Lab gave verbal read as PT 16.1, INR 1.6, dated for 01/30/2025.

## 2025-02-03 ENCOUNTER — ANTI-COAG VISIT (OUTPATIENT)
Dept: CARDIOLOGY | Facility: CLINIC | Age: 70
End: 2025-02-03
Payer: MEDICARE

## 2025-02-03 ENCOUNTER — TELEPHONE (OUTPATIENT)
Dept: TRANSPLANT | Facility: CLINIC | Age: 70
End: 2025-02-03
Payer: MEDICARE

## 2025-02-03 DIAGNOSIS — Z95.811 LVAD (LEFT VENTRICULAR ASSIST DEVICE) PRESENT: Primary | ICD-10-CM

## 2025-02-03 LAB
EXT ALBUMIN: 2.8
EXT ALT: 9
EXT AST: 24
EXT BILIRUBIN TOTAL: 1.5
EXT BUN: 26
EXT CALCIUM: 9.2
EXT CHLORIDE: 102
EXT CREATININE: 1.4 MG/DL
EXT GLUCOSE: 138
EXT NT PROBNP: 3109
EXT POTASSIUM: 4
EXT PROTEIN TOTAL: 9.3
EXT SODIUM: 141 MMOL/L
INR PPP: 1.7

## 2025-02-03 PROCEDURE — 93793 ANTICOAG MGMT PT WARFARIN: CPT | Mod: S$GLB,,,

## 2025-02-03 NOTE — PROGRESS NOTES
[facility]    2025 10:15 AM    Assessment/Plan:    Patient presents today with therapeutic INR.    Assessment of patient findings and chart review: no significant findings     Recommendation for patient's warfarin regimen: Continue current maintenance dose    Recommend repeat INR Thursday  _________________________________________________________________    Rocco ESTRADA Román (69 y.o.) is followed by the [facility].    Anticoagulation Summary  As of 2/3/2025      INR goal:  1.5-2.0   TTR:  57.0% (3.8 y)   INR used for dosin.7 (2/3/2025)   Warfarin maintenance plan:  5 mg (5 mg x 1) every Mon, Fri; 2.5 mg (5 mg x 0.5) all other days   Weekly warfarin total:  22.5 mg   Plan last modified:  Cheryl Barahona, PharmD (2025)   Next INR check:  2025   Target end date:  --    Indications    LVAD (left ventricular assist device) present [Z95.811]                 Anticoagulation Episode Summary       INR check location:  --    Preferred lab:  --    Send INR reminders to:  [facility] COUMADIN LVAD    Comments:  LVAD // (no INR) [facility]  [phone]  //[facility] ph [phone]/ results Lab - [phone]          Anticoagulation Care Providers       Provider Role Specialty Phone number    Pete Garnett MD [facility] Cardiology [phone]

## 2025-02-03 NOTE — TELEPHONE ENCOUNTER
Labs received and reviewed. BNP, Tbili and Cr improved over the past several weeks with increased diuretics dose. Reports 7lb weight loss since increasing torsemide. No further changes at this time.  Instructed patient to call VAD coordinator with weight gain >3lb in 24hrs or >5lb in 1 week.  Patient verbalized understanding.

## 2025-02-05 ENCOUNTER — PATIENT MESSAGE (OUTPATIENT)
Dept: TRANSPLANT | Facility: CLINIC | Age: 70
End: 2025-02-05
Payer: MEDICARE

## 2025-02-06 ENCOUNTER — ANTI-COAG VISIT (OUTPATIENT)
Dept: CARDIOLOGY | Facility: CLINIC | Age: 70
End: 2025-02-06
Payer: MEDICARE

## 2025-02-06 DIAGNOSIS — Z95.811 LVAD (LEFT VENTRICULAR ASSIST DEVICE) PRESENT: Primary | ICD-10-CM

## 2025-02-06 LAB — INR PPP: 1.7

## 2025-02-06 PROCEDURE — 93793 ANTICOAG MGMT PT WARFARIN: CPT | Mod: S$GLB,,,

## 2025-02-06 NOTE — PROGRESS NOTES
[facility]    2025 1:06 PM    Assessment/Plan:    Patient presents today with therapeutic INR.    Assessment of patient findings and chart review: no significant findings     Recommendation for patient's warfarin regimen: Continue current maintenance dose    Recommend repeat INR in 1 week  _________________________________________________________________    Rocco ESTRADA Román (69 y.o.) is followed by the [employer] [facility]tico[facility] Management [facility].    Anticoagulation Summary  As of 2025      INR goal:  1.5-2.0   TTR:  57.1% (3.8 y)   INR used for dosin.7 (2025)   Warfarin maintenance plan:  5 mg (5 mg x 1) every Mon, Fri; 2.5 mg (5 mg x 0.5) all other days   Weekly warfarin total:  22.5 mg   Plan last modified:  Cheryl Barahona, PharmD (2025)   Next INR check:  2025   Target end date:  --    Indications    LVAD (left ventricular assist device) present [Z95.811]                 Anticoagulation Episode Summary       INR check location:  --    Preferred lab:  --    Send INR reminders to:  [facility] COUMADIN LVAD    Comments:  LVAD // (no INR) [facility]  [phone]  //[facility] ph [phone]/ results Lab - [phone]          Anticoagulation Care Providers       Provider Role Specialty Phone number    Pete Garnett MD [facility] Cardiology [phone]

## 2025-02-13 ENCOUNTER — ANTI-COAG VISIT (OUTPATIENT)
Dept: CARDIOLOGY | Facility: CLINIC | Age: 70
End: 2025-02-13
Payer: MEDICARE

## 2025-02-13 DIAGNOSIS — Z95.811 LVAD (LEFT VENTRICULAR ASSIST DEVICE) PRESENT: Primary | ICD-10-CM

## 2025-02-13 LAB — INR PPP: 1.5

## 2025-02-13 PROCEDURE — 93793 ANTICOAG MGMT PT WARFARIN: CPT | Mod: S$GLB,,,

## 2025-02-13 NOTE — PROGRESS NOTES
[facility]    2025 10:46 AM    Assessment/Plan:    Patient presents today with therapeutic INR.    Assessment of patient findings and chart review: no significant findings     Recommendation for patient's warfarin regimen: Continue current maintenance dose    Recommend repeat INR in 1 week  _________________________________________________________________    Rocco ESTRADA Román (69 y.o.) is followed by the [employer] [facility].    Anticoagulation Summary  As of 2025      INR goal:  1.5-2.0   TTR:  57.3% (3.8 y)   INR used for dosin.5 (2/10/2025)   Warfarin maintenance plan:  5 mg (5 mg x 1) every Mon, Fri; 2.5 mg (5 mg x 0.5) all other days   Weekly warfarin total:  22.5 mg   Plan last modified:  Cheryl Barahona, PharmD (2025)   Next INR check:  2025   Target end date:  --    Indications    LVAD (left ventricular assist device) present [Z95.811]                 Anticoagulation Episode Summary       INR check location:  --    Preferred lab:  --    Send INR reminders to:  [facility] COUMADIN LVAD    Comments:  LVAD // (no INR) [facility]  [phone]  //[facility] ph [phone]/ results Lab - [phone]          Anticoagulation Care Providers       Provider Role Specialty Phone number    Pete Garnett MD [facility] Cardiology [phone]

## 2025-02-14 ENCOUNTER — TELEPHONE (OUTPATIENT)
Dept: PALLIATIVE MEDICINE | Facility: CLINIC | Age: 70
End: 2025-02-14
Payer: MEDICARE

## 2025-02-17 ENCOUNTER — ANTI-COAG VISIT (OUTPATIENT)
Dept: CARDIOLOGY | Facility: CLINIC | Age: 70
End: 2025-02-17
Payer: MEDICARE

## 2025-02-17 DIAGNOSIS — Z95.811 LVAD (LEFT VENTRICULAR ASSIST DEVICE) PRESENT: Primary | ICD-10-CM

## 2025-02-17 LAB — INR PPP: 1.7

## 2025-02-17 NOTE — PROGRESS NOTES
[facility]    2025 1:13 PM    Assessment/Plan:    Patient presents today with therapeutic INR.    Assessment of patient findings and chart review: no significant findings     Recommendation for patient's warfarin regimen: Continue current maintenance dose    Recommend repeat INR in 1 week  _________________________________________________________________    Rocco ESTRADA Román (69 y.o.) is followed by the [employer] [facility]tico[facility] Management [facility].    Anticoagulation Summary  As of 2025      INR goal:  1.5-2.0   TTR:  57.5% (3.9 y)   INR used for dosin.7 (2025)   Warfarin maintenance plan:  5 mg (5 mg x 1) every Mon, Fri; 2.5 mg (5 mg x 0.5) all other days   Weekly warfarin total:  22.5 mg   Plan last modified:  Cheryl Barahona, PharmD (2025)   Next INR check:  2025   Target end date:  --    Indications    LVAD (left ventricular assist device) present [Z95.811]                 Anticoagulation Episode Summary       INR check location:  --    Preferred lab:  --    Send INR reminders to:  [facility] COUMADIN LVAD    Comments:  LVAD // (no INR) [facility]  [phone]  //[facility] ph [phone]/ results Lab - [phone]          Anticoagulation Care Providers       Provider Role Specialty Phone number    Pete Garnett MD [facility] Cardiology [phone]

## 2025-02-25 ENCOUNTER — ANTI-COAG VISIT (OUTPATIENT)
Dept: CARDIOLOGY | Facility: CLINIC | Age: 70
End: 2025-02-25
Payer: MEDICARE

## 2025-02-25 DIAGNOSIS — Z95.811 LVAD (LEFT VENTRICULAR ASSIST DEVICE) PRESENT: Primary | ICD-10-CM

## 2025-02-25 LAB — INR PPP: 1.6

## 2025-02-25 PROCEDURE — 93793 ANTICOAG MGMT PT WARFARIN: CPT | Mod: S$GLB,,,

## 2025-02-25 NOTE — PROGRESS NOTES
[facility]    2025 9:12 AM    Assessment/Plan:    Patient presents today with therapeutic INR.    Assessment of patient findings and chart review: no significant findings     Recommendation for patient's warfarin regimen: Continue current maintenance dose    Recommend repeat INR next week  _________________________________________________________________    Rocco ESTRADA Román (69 y.o.) is followed by the [facility].    Anticoagulation Summary  As of 2025      INR goal:  1.5-2.0   TTR:  57.7% (3.9 y)   INR used for dosin.6 (2025)   Warfarin maintenance plan:  5 mg (5 mg x 1) every Mon, Fri; 2.5 mg (5 mg x 0.5) all other days   Weekly warfarin total:  22.5 mg   Plan last modified:  Cheryl Barahona, PharmD (2025)   Next INR check:  3/5/2025   Target end date:  --    Indications    LVAD (left ventricular assist device) present [Z95.811]                 Anticoagulation Episode Summary       INR check location:  --    Preferred lab:  --    Send INR reminders to:  [facility] COUMADIN LVAD    Comments:  LVAD // (no INR) [facility]  [phone]  //[facility] ph [phone]/ results Lab - [phone]          Anticoagulation Care Providers       Provider Role Specialty Phone number    Pete Garnett MD [facility] Cardiology [phone]

## 2025-03-05 ENCOUNTER — ANTI-COAG VISIT (OUTPATIENT)
Dept: CARDIOLOGY | Facility: CLINIC | Age: 70
End: 2025-03-05
Payer: MEDICARE

## 2025-03-05 DIAGNOSIS — Z95.811 LVAD (LEFT VENTRICULAR ASSIST DEVICE) PRESENT: Primary | ICD-10-CM

## 2025-03-05 LAB — INR PPP: 2

## 2025-03-05 PROCEDURE — 93793 ANTICOAG MGMT PT WARFARIN: CPT | Mod: S$GLB,,,

## 2025-03-05 NOTE — PROGRESS NOTES
[facility]    03/05/2025    Rocco France (69 y.o.) is followed by the [employer].      Assessment/Plan:    Rocco France presents with a therapeutic INR. Goal INR: 1.5-2.0    Lab Results   Component Value Date    INR 2.0 03/05/2025    INR 1.6 02/24/2025    INR 1.7 02/17/2025       Assessment of patient findings per MA/LPN and chart review:   The following significant findings were found:   none    Recommendation for patient's warfarin regimen:   No change was made to warfarin therapy during this visit and patient has been instructed to continue their current warfarin regimen.    Recommended repeat INR in 1 week      Bradley ZacariasD, BCPS  Clinical Pharmacist - [facility] [facility]  Preferred Contact: Secure Messaging or In Basket Message

## 2025-03-11 ENCOUNTER — PATIENT MESSAGE (OUTPATIENT)
Dept: TRANSPLANT | Facility: CLINIC | Age: 70
End: 2025-03-11
Payer: MEDICARE

## 2025-03-12 ENCOUNTER — ANTI-COAG VISIT (OUTPATIENT)
Dept: CARDIOLOGY | Facility: CLINIC | Age: 70
End: 2025-03-12
Payer: MEDICARE

## 2025-03-12 DIAGNOSIS — Z95.811 LVAD (LEFT VENTRICULAR ASSIST DEVICE) PRESENT: Primary | ICD-10-CM

## 2025-03-12 LAB — INR PPP: 1.7

## 2025-03-12 PROCEDURE — 93793 ANTICOAG MGMT PT WARFARIN: CPT | Mod: S$GLB,,,

## 2025-03-12 NOTE — PROGRESS NOTES
[facility]    2025 11:50 AM    Assessment/Plan:    Patient presents today with therapeutic INR.    Assessment of patient findings and chart review: no significant findings     Recommendation for patient's warfarin regimen: Continue current maintenance dose    Recommend repeat INR in 1 week  _________________________________________________________________    Rocco ESTRADA Román (69 y.o.) is followed by the [employer] [facility].    Anticoagulation Summary  As of 3/12/2025      INR goal:  1.5-2.0   TTR:  58.1% (3.9 y)   INR used for dosin.7 (3/12/2025)   Warfarin maintenance plan:  5 mg (5 mg x 1) every Mon, Fri; 2.5 mg (5 mg x 0.5) all other days   Weekly warfarin total:  22.5 mg   Plan last modified:  Cheryl Barahona, PharmD (2025)   Next INR check:  3/19/2025   Target end date:  --    Indications    LVAD (left ventricular assist device) present [Z95.811]                 Anticoagulation Episode Summary       INR check location:  --    Preferred lab:  --    Send INR reminders to:  [facility] COUMADIN LVAD    Comments:  LVAD // (no INR) [facility]  [phone]  //[facility] ph [phone]/ results Lab - [phone]          Anticoagulation Care Providers       Provider Role Specialty Phone number    Pete Garnett MD [facility] Cardiology [phone]

## 2025-03-19 ENCOUNTER — ANTI-COAG VISIT (OUTPATIENT)
Dept: CARDIOLOGY | Facility: CLINIC | Age: 70
End: 2025-03-19
Payer: MEDICARE

## 2025-03-19 DIAGNOSIS — Z95.811 LVAD (LEFT VENTRICULAR ASSIST DEVICE) PRESENT: Primary | ICD-10-CM

## 2025-03-19 LAB — INR PPP: 1.4

## 2025-03-19 PROCEDURE — 93793 ANTICOAG MGMT PT WARFARIN: CPT | Mod: S$GLB,,,

## 2025-03-20 NOTE — PROGRESS NOTES
[facility]    2025 8:47 AM    Assessment/Plan:    Patient presents today with subtherapeutic  INR.    Assessment of patient findings and chart review: reports spinach intake     Recommendation for patient's warfarin regimen: Boost dose today to 5mg then resume current maintenance dose    Recommend repeat INR in 1 week  _________________________________________________________________    Rocco ESTRADA Román (69 y.o.) is followed by the [employer] [facility].    Anticoagulation Summary  As of 3/19/2025      INR goal:  1.5-2.0   TTR:  58.2% (4 y)   INR used for dosin.4 (3/19/2025)   Warfarin maintenance plan:  5 mg (5 mg x 1) every Mon, Fri; 2.5 mg (5 mg x 0.5) all other days   Weekly warfarin total:  22.5 mg   Plan last modified:  Cheryl Barahona, PharmD (2025)   Next INR check:  3/26/2025   Target end date:  --    Indications    LVAD (left ventricular assist device) present [Z95.811]                 Anticoagulation Episode Summary       INR check location:  --    Preferred lab:  --    Send INR reminders to:  [facility] COUMADIN LVAD    Comments:  LVAD // (no INR) [facility]  [phone]  //[facility] ph [phone]/ results Lab - [phone]          Anticoagulation Care Providers       Provider Role Specialty Phone number    Pete Garnett MD [facility] Cardiology [phone]

## 2025-03-26 ENCOUNTER — ANTI-COAG VISIT (OUTPATIENT)
Dept: CARDIOLOGY | Facility: CLINIC | Age: 70
End: 2025-03-26
Payer: MEDICARE

## 2025-03-26 DIAGNOSIS — Z95.811 LVAD (LEFT VENTRICULAR ASSIST DEVICE) PRESENT: Primary | ICD-10-CM

## 2025-03-26 LAB — INR PPP: 1.5

## 2025-03-26 PROCEDURE — 93793 ANTICOAG MGMT PT WARFARIN: CPT | Mod: S$GLB,,,

## 2025-03-26 NOTE — PROGRESS NOTES
[facility]    2025 3:53 PM    Assessment/Plan:    Patient presents today with therapeutic INR.    Assessment of patient findings and chart review: no significant findings     Recommendation for patient's warfarin regimen: Continue current maintenance dose    Recommend repeat INR in 1 week  _________________________________________________________________    Rocco NATALIE France (69 y.o.) is followed by the [employer] [facility].    Anticoagulation Summary  As of 3/26/2025      INR goal:  1.5-2.0   TTR:  57.9% (4 y)   INR used for dosin.5 (3/26/2025)   Warfarin maintenance plan:  5 mg (5 mg x 1) every Mon, Fri; 2.5 mg (5 mg x 0.5) all other days   Weekly warfarin total:  22.5 mg   Plan last modified:  Cheryl Barahona, PharmD (2025)   Next INR check:  2025   Target end date:  --    Indications    LVAD (left ventricular assist device) present [Z95.811]                 Anticoagulation Episode Summary       INR check location:  --    Preferred lab:  --    Send INR reminders to:  [facility] COUMADIN LVAD    Comments:  LVAD // (no INR) [facility]  [phone]  //[facility] ph [phone]/ results Lab - [phone]          Anticoagulation Care Providers       Provider Role Specialty Phone number    Pete Garnett MD [facility] Cardiology [phone]

## 2025-04-02 ENCOUNTER — ANTI-COAG VISIT (OUTPATIENT)
Dept: CARDIOLOGY | Facility: CLINIC | Age: 70
End: 2025-04-02
Payer: MEDICARE

## 2025-04-02 DIAGNOSIS — Z95.811 LVAD (LEFT VENTRICULAR ASSIST DEVICE) PRESENT: Primary | ICD-10-CM

## 2025-04-02 LAB — INR PPP: 1.9

## 2025-04-02 PROCEDURE — 93793 ANTICOAG MGMT PT WARFARIN: CPT | Mod: S$GLB,,,

## 2025-04-02 NOTE — PROGRESS NOTES
[facility]    2025 4:01 PM    Assessment/Plan:    Patient presents today with therapeutic INR.    Assessment of patient findings and chart review: no significant findings     Recommendation for patient's warfarin regimen: Continue current maintenance dose    Recommend repeat INR in 1 week  _________________________________________________________________    Rocco ESTRADA Román (69 y.o.) is followed by the [employer] [facility].    Anticoagulation Summary  As of 2025      INR goal:  1.5-2.0   TTR:  58.1% (4 y)   INR used for dosin.9 (2025)   Warfarin maintenance plan:  5 mg (5 mg x 1) every Mon, Fri; 2.5 mg (5 mg x 0.5) all other days   Weekly warfarin total:  22.5 mg   Plan last modified:  Cheryl Barahona, PharmD (2025)   Next INR check:  2025   Target end date:  --    Indications    LVAD (left ventricular assist device) present [Z95.811]                 Anticoagulation Episode Summary       INR check location:  --    Preferred lab:  --    Send INR reminders to:  [facility] COUMADIN LVAD    Comments:  LVAD // (no INR) [facility]  [phone]  //[facility] ph [phone]/ results Lab - [phone]          Anticoagulation Care Providers       Provider Role Specialty Phone number    Pete Garnett MD [facility] Cardiology [phone]

## 2025-04-04 ENCOUNTER — PATIENT MESSAGE (OUTPATIENT)
Dept: TRANSPLANT | Facility: CLINIC | Age: 70
End: 2025-04-04
Payer: MEDICARE

## 2025-04-08 ENCOUNTER — TELEPHONE (OUTPATIENT)
Dept: TRANSPLANT | Facility: CLINIC | Age: 70
End: 2025-04-08
Payer: MEDICARE

## 2025-04-08 NOTE — TELEPHONE ENCOUNTER
VAD Contact: Called Patient regarding equipment maintenance due at upcoming clinic appointment on 4/8.  Requested Patient to bring Mobile Power Unit (MPU), Battery Charger (UBC), and Emergency Bag (2 batteries, 2 clips, 1 backup controller) with them to next appointment for maintenance.  Patient verbalized understanding and agreement.    It is medically necessary to ensure patient has properly functioning equipment and wearables to prevent infection, injury or death to patient.

## 2025-04-09 ENCOUNTER — LAB VISIT (OUTPATIENT)
Dept: LAB | Facility: HOSPITAL | Age: 70
End: 2025-04-09
Attending: INTERNAL MEDICINE
Payer: MEDICARE

## 2025-04-09 ENCOUNTER — CLINICAL SUPPORT (OUTPATIENT)
Dept: TRANSPLANT | Facility: CLINIC | Age: 70
End: 2025-04-09
Payer: MEDICARE

## 2025-04-09 ENCOUNTER — ANTI-COAG VISIT (OUTPATIENT)
Dept: CARDIOLOGY | Facility: CLINIC | Age: 70
End: 2025-04-09
Payer: MEDICARE

## 2025-04-09 ENCOUNTER — OFFICE VISIT (OUTPATIENT)
Dept: TRANSPLANT | Facility: CLINIC | Age: 70
End: 2025-04-09
Payer: MEDICARE

## 2025-04-09 VITALS
TEMPERATURE: 98 F | WEIGHT: 176 LBS | BODY MASS INDEX: 22.59 KG/M2 | HEIGHT: 74 IN | SYSTOLIC BLOOD PRESSURE: 106 MMHG | HEART RATE: 79 BPM

## 2025-04-09 DIAGNOSIS — Z95.811 HEART REPLACED BY HEART ASSIST DEVICE: ICD-10-CM

## 2025-04-09 DIAGNOSIS — I42.8 NICM (NONISCHEMIC CARDIOMYOPATHY): ICD-10-CM

## 2025-04-09 DIAGNOSIS — Z95.811 LVAD (LEFT VENTRICULAR ASSIST DEVICE) PRESENT: Primary | ICD-10-CM

## 2025-04-09 DIAGNOSIS — Z95.811 LVAD (LEFT VENTRICULAR ASSIST DEVICE) PRESENT: ICD-10-CM

## 2025-04-09 DIAGNOSIS — I62.00 SUBDURAL HEMATOMA, NONTRAUMATIC: ICD-10-CM

## 2025-04-09 LAB
ALBUMIN SERPL BCP-MCNC: 3.1 G/DL (ref 3.5–5.2)
ALP SERPL-CCNC: 184 UNIT/L (ref 40–150)
ALT SERPL W/O P-5'-P-CCNC: 5 UNIT/L (ref 10–44)
ANION GAP (OHS): 6 MMOL/L (ref 8–16)
AST SERPL-CCNC: 15 UNIT/L (ref 11–45)
BILIRUB SERPL-MCNC: 1.4 MG/DL (ref 0.1–1)
BNP SERPL-MCNC: 332 PG/ML (ref 0–99)
BUN SERPL-MCNC: 20 MG/DL (ref 8–23)
CALCIUM SERPL-MCNC: 9.3 MG/DL (ref 8.7–10.5)
CHLORIDE SERPL-SCNC: 102 MMOL/L (ref 95–110)
CO2 SERPL-SCNC: 30 MMOL/L (ref 23–29)
CREAT SERPL-MCNC: 1.1 MG/DL (ref 0.5–1.4)
GFR SERPLBLD CREATININE-BSD FMLA CKD-EPI: >60 ML/MIN/1.73/M2
GLUCOSE SERPL-MCNC: 194 MG/DL (ref 70–110)
INR PPP: 1.3 (ref 0.8–1.2)
POTASSIUM SERPL-SCNC: 4.5 MMOL/L (ref 3.5–5.1)
PROT SERPL-MCNC: 9.7 GM/DL (ref 6–8.4)
PROTHROMBIN TIME: 14.3 SECONDS (ref 9–12.5)
SODIUM SERPL-SCNC: 138 MMOL/L (ref 136–145)

## 2025-04-09 PROCEDURE — 1159F MED LIST DOCD IN RCRD: CPT | Mod: CPTII,S$GLB,, | Performed by: INTERNAL MEDICINE

## 2025-04-09 PROCEDURE — 3008F BODY MASS INDEX DOCD: CPT | Mod: CPTII,S$GLB,, | Performed by: INTERNAL MEDICINE

## 2025-04-09 PROCEDURE — 1160F RVW MEDS BY RX/DR IN RCRD: CPT | Mod: CPTII,S$GLB,, | Performed by: INTERNAL MEDICINE

## 2025-04-09 PROCEDURE — 80307 DRUG TEST PRSMV CHEM ANLYZR: CPT

## 2025-04-09 PROCEDURE — 99214 OFFICE O/P EST MOD 30 MIN: CPT | Mod: S$GLB,,, | Performed by: INTERNAL MEDICINE

## 2025-04-09 PROCEDURE — 3288F FALL RISK ASSESSMENT DOCD: CPT | Mod: CPTII,S$GLB,, | Performed by: INTERNAL MEDICINE

## 2025-04-09 PROCEDURE — 36415 COLL VENOUS BLD VENIPUNCTURE: CPT

## 2025-04-09 PROCEDURE — 85610 PROTHROMBIN TIME: CPT

## 2025-04-09 PROCEDURE — 93750 INTERROGATION VAD IN PERSON: CPT | Mod: S$GLB,,, | Performed by: INTERNAL MEDICINE

## 2025-04-09 PROCEDURE — 83880 ASSAY OF NATRIURETIC PEPTIDE: CPT

## 2025-04-09 PROCEDURE — 1126F AMNT PAIN NOTED NONE PRSNT: CPT | Mod: CPTII,S$GLB,, | Performed by: INTERNAL MEDICINE

## 2025-04-09 PROCEDURE — 1101F PT FALLS ASSESS-DOCD LE1/YR: CPT | Mod: CPTII,S$GLB,, | Performed by: INTERNAL MEDICINE

## 2025-04-09 PROCEDURE — 80053 COMPREHEN METABOLIC PANEL: CPT

## 2025-04-09 PROCEDURE — 99999 PR PBB SHADOW E&M-EST. PATIENT-LVL III: CPT | Mod: PBBFAC,,, | Performed by: INTERNAL MEDICINE

## 2025-04-09 NOTE — PROGRESS NOTES
Date of Implant with Heartmate 3 LVAD: 1/13/2021    PATIENT ARRIVED IN CLINIC:  Ambulatory   Accompanied by: self  Complaints/reason for visit today: routine    Vitals  Temperature, oral:   Temp Readings from Last 1 Encounters:   04/09/25 97.7 °F (36.5 °C) (Oral)     Blood Pressure:   BP Readings from Last 3 Encounters:   04/09/25 (!) 106/0   01/09/25 (!) 106/0   10/10/24 (!) 100/0        VAD Interrogation:      1/9/2025     9:21 AM 10/10/2024     8:52 AM 6/12/2024     9:35 AM   TXP JACEY INTERROGATIONS   Type HeartMate3 HeartMate3 HeartMate3   Flow 3.9 3.8 3.6   Speed 5200 5200 5200   PI 7 8.2 7.6   Power (Edwards) 3.7 3.6 3.5   LSL 4800 4800 4800   Pulsatility Intermittent pulse No Pulse No Pulse     HCT:   Lab Results   Component Value Date    HCT 37.0 (L) 01/09/2025    HCT 30 (L) 03/22/2022       VAD Assessment  Problems / Issues /Equipment Needs/ Alarms with VAD if any:  damage to modular cable, lights on controller dim  VAD Sounds: HM3 Smooth  Emergency Equipment With Patient: Yes   Equipment Needs: Yes   It is medically necessary to ensure patient has properly functioning equipment and wearables to prevent infection, injury or death to patient.     DLES Assessment and Dressing Care:  Appearance of DLES: 1  Antibiotics: NO  Velour: No  Manual & Visual Inspection Of Driveline: New kink and tears, will need exchange when INR therapeutic  Stabilization Device In Use: yes, [place] securement device    Heartmate 3 Module Cable:  No yellow exposed and Attempted to unscrew modular cable to ensure it will be able to come lose in the event we ever need to change the modular cable while patient held the driveline in place so it would not move. Modular cable connection able to be unscrewed and re-tightened. Instructed pt to perform this weekly.  Frequency of Dressing Changes: every other day & daily kit   Pt In Need Of Management Kits?:yes -   1 Box of daily kit  It is medically necessary to have VAD management kits in order  to prevent infection or to assist in the healing of an infected DLES.      Assessment/ Quality of Life Survery:   Complaints Of Nausea / Vomiting: None noted    Appearance and Frequency Of Stools: normal and formed without blood & daily  Color Of Urine: clear/yellow  Pain: NO  Coping/Depression/Anxiety: coping okay  Sleep Habits: 5-6 hrs /night  Sleep Aids:  mirtazepine  Showering: No  Self- Care I have no problems with self- care  Mobility I have no problems walking about  Usual Activities I have no problems with performing my usual activities  Activity/Exercise: walking around [employer]   Driving: Yes. Reminded to pull over should there be an alarm before looking down at controller.  Additional Comments: n/a    Labs:    Chemistry        Component Value Date/Time     01/09/2025 0750    K 3.9 01/09/2025 0750     01/09/2025 0750    CO2 24 01/09/2025 0750    BUN 17 01/09/2025 0750    CREATININE 1.1 01/09/2025 0750     (H) 01/09/2025 0750        Component Value Date/Time    CALCIUM 9.1 01/09/2025 0750    ALKPHOS 212 (H) 01/09/2025 0750    AST 19 01/09/2025 0750    ALT 11 01/09/2025 0750    BILITOT 2.9 (H) 01/09/2025 0750    ESTGFRAFRICA 55.3 (A) 07/13/2022 0834    EGFRNONAA 47.8 (A) 07/13/2022 0834            Magnesium   Date Value Ref Range Status   01/09/2025 1.9 1.6 - 2.6 mg/dL Final       Lab Results   Component Value Date    WBC 8.03 01/09/2025    HGB 11.3 (L) 01/09/2025    HCT 37.0 (L) 01/09/2025    MCV 92 01/09/2025     01/09/2025       Lab Results   Component Value Date    INR 1.9 04/02/2025    INR 1.5 03/26/2025    INR 1.4 03/19/2025       BNP   Date Value Ref Range Status   01/09/2025 1,141 (H) 0 - 99 pg/mL Final     Comment:     Values of less than 100 pg/ml are consistent with non-CHF populations.   10/10/2024 1,308 (H) 0 - 99 pg/mL Final     Comment:     Values of less than 100 pg/ml are consistent with non-CHF populations.   06/12/2024 1,039 (H) 0 - 99 pg/mL Final     Comment:      Values of less than 100 pg/ml are consistent with non-CHF populations.       LD   Date Value Ref Range Status   01/09/2025 295 (H) 110 - 260 U/L Final     Comment:     Results are increased in hemolyzed samples.   10/10/2024 248 110 - 260 U/L Final     Comment:     Results are increased in hemolyzed samples.   06/12/2024 248 110 - 260 U/L Final     Comment:     Results are increased in hemolyzed samples.       Labs reviewed with patient: YES     Medication Reconciliation: per MA.  New Medication Detail Provided: yes  Coumadin Managed by: [facility]    Education: Reviewed driveline care, emergency procedures, how to change the controller, alarms with patient, as well as discussed how to page the VAD coordinator in case of an emergency.   Educated patient/family that chest compressions are allowed in the event they are needed.    Reminded patient/caregiver not to touch their face and to cover their mouth when they cough or sneeze.   Vaccines: Pt informed that we are encouraging all VAD patients to receive  vaccines and boosters. Informed pt that they can take tylenol but should avoid other NSAIDs.       Plans/Needs: Routine f/u w/ Dr Lake. Patient reports feeling well. BP slightly elevated, did not take medications before clinic. Needs modular cable and controller exchange but INR 1.3. Patient will return in 1 month with repeat INR and if appropriate equipment exchange at that time.       RTC 3 months w/ routine echo    Hurricane Season: No

## 2025-04-09 NOTE — PROCEDURES
4/9/2025     9:34 AM 1/9/2025     9:21 AM 10/10/2024     8:52 AM 6/12/2024     9:35 AM 3/7/2024     9:32 AM 12/7/2023    11:13 AM 10/4/2023     9:38 AM   TXP JACEY INTERROGATIONS   Type HeartMate3 HeartMate3 HeartMate3 HeartMate3 HeartMate3 HeartMate3 HeartMate3   Flow 4 3.9 3.8 3.6 4.3 4.5 4   Speed 5200 5200 5200 5200 5200 5200 5200   PI 7.8 7 8.2 7.6 6.4 5.7 8.3   Power ([facility]wards) 3.7 3.7 3.6 3.5 3.8 3.8 3.7   LSL 4800 4800 4800 4800 4800 4800 4800   Pulsatility Pulse Intermittent pulse No Pulse No Pulse Pulse Pulse Pulse   }

## 2025-04-09 NOTE — LETTER
April 9, 2025        Teresa Mendoza  [address]  [address]  [place] [address]  Phone: [phone]  Fax: [phone]             [facility]  [address]  [place] [address]  Phone: [phone]   Patient: Rocco France   MR Number: [id number]   YOB: 1955   Date of Visit: 4/9/2025       Dear Dr. Teresa Mendoza    Thank you for referring Rocco France to me for evaluation. Attached you will find relevant portions of my assessment and plan of care.    If you have questions, please do not hesitate to call me. I look forward to following Rocco France along with you.    Sincerely,    Deana Lake MD    Enclosure    If you would like to receive this communication electronically, please contact [email or web address] or [phone] to request [facility] Link access.    [facility] Link is a tool which provides read-only access to select patient information with whom you have a relationship. Its easy to use and provides real time access to review your patients record including encounter summaries, notes, results, and demographic information.    If you feel you have received this communication in error or would no longer like to receive these types of communications, please e-mail [email or web address]

## 2025-04-09 NOTE — PROGRESS NOTES
Subjective:   Patient ID:  Rocco France is a 69 y.o. male who presents for LVAD followup visit.    Implant Date: 1/13/2021  Initials: [facility]     Heartmate 3 RPM 5200     INR goal: 2-2.5  Bridge with Heparin   Antiplatelets: none        4/9/2025     9:34 AM   TXP JACEY INTERROGATIONS   Type HeartMate3   Flow 4   Speed 5200   PI 7.8   Power (Edwards) 3.7   LSL 4800   Pulsatility Pulse   Interrogation of Ventricular assist device was performed with physician analysis of device parameters and review of device function. I have personally reviewed the interrogation findings and agree with findings as stated.     HPI  70 yo BM with stage D CHF due to NICM underwent HM3 implantation 1/13/2021 with sternal closure 1/14/2021.  He was admitted for syncope 1/27/2022 at which time he was found to have an evolving right cerebral acute subdural hematoma and acute basal cistern subarachnoid hemorrhage. He requiring intubation with prolonged hospitalization and was incidentally found to be positive for COVID-19 and treated with remdesivir. He was taken off of aspirin and discharged home 2/17/2022. He was readmitted 3/2022 for 3 days due to dysequilibrium, diplopia and repeat CT head was stable. Neurology recommended myasthenia gravis workup which was negative.     Patient again with elevated blood pressure today in clinic. He did not take meds before clinic visit. INR not therapeutic, not exchanging modular cable or controller. Rest of labs pending. Lives alone, doing his own dressing changes. Patient denies N/V/F/C, lightheadedness, dizziness, PND, orthopnea, abdominal pain, abdominal pressure, chest pain, chest pressure, syncope, pre-syncope. Additionally patient has no bleeding, no bright red blood per rectum, melena, no GI symptoms at all.    NYHA FC II.     Review of Systems   Constitutional: Positive for malaise/fatigue. Negative for weight gain and weight loss.   Cardiovascular:  Positive for dyspnea on exertion and leg swelling.  "Negative for chest pain, orthopnea, palpitations, paroxysmal nocturnal dyspnea and syncope.   Hematologic/Lymphatic: Does not bruise/bleed easily.   Gastrointestinal:  Negative for hematochezia and melena.   Genitourinary:  Negative for hematuria.   Neurological:  Positive for weakness. Negative for dizziness and light-headedness.     Objective:   Blood pressure (!) 106/0, pulse 79, temperature 97.7 °F (36.5 °C), temperature source Oral, height 6' 2" (1.88 m), weight 79.8 kg (176 lb).body mass index is 22.6 kg/m².       Physical Exam  Vitals and nursing note reviewed.   Constitutional:       General: He is not in acute distress.     Appearance: He is well-developed. He is not diaphoretic.   HENT:      Head: Normocephalic and atraumatic.   Eyes:      Conjunctiva/sclera: Conjunctivae normal.      Pupils: Pupils are equal, round, and reactive to light.   Neck:      Thyroid: No thyromegaly.      Vascular: No JVD.   Cardiovascular:      Rate and Rhythm: Normal rate and regular rhythm.      Heart sounds: No murmur heard.     No friction rub. No gallop.      Comments: HM3 hum throughout precordium  Pulmonary:      Effort: Pulmonary effort is normal. No respiratory distress.      Breath sounds: Normal breath sounds. No wheezing or rales.   Abdominal:      General: Bowel sounds are normal. There is no distension.      Palpations: Abdomen is soft.   Musculoskeletal:         General: No tenderness.      Cervical back: Normal range of motion and neck supple.   Lymphadenopathy:      Cervical: No cervical adenopathy.   Skin:     General: Skin is warm.      Coloration: Skin is not pale.      Findings: No erythema or rash.   Neurological:      Mental Status: He is alert and oriented to person, place, and time.   Psychiatric:         Behavior: Behavior normal.       Lab Results   Component Value Date     (H) 04/09/2025    BNP 1,141 (H) 01/09/2025    BNP 1,308 (H) 10/10/2024     Lab Results   Component Value Date     " (H) 04/09/2025     04/09/2025     01/09/2025    K 4.5 04/09/2025    K 3.9 01/09/2025    MG 1.9 01/09/2025     04/09/2025     01/09/2025    CO2 30 (H) 04/09/2025    CO2 24 01/09/2025    PHOS 2.8 01/09/2025    BUN 20 04/09/2025    CREATININE 1.1 04/09/2025     (H) 01/09/2025    HGBA1C 6.6 (H) 05/19/2023    AST 15 04/09/2025    AST 19 01/09/2025    ALT 5 (L) 04/09/2025    ALT 11 01/09/2025    ALBUMIN 3.1 (L) 04/09/2025    ALBUMIN 3.0 (L) 01/09/2025    PROT 10.4 (H) 01/09/2025    BILITOT 1.4 (H) 04/09/2025    BILITOT 2.9 (H) 01/09/2025    WBC 8.03 01/09/2025    HGB 11.3 (L) 01/09/2025    HCT 37.0 (L) 01/09/2025    HCT 30 (L) 03/22/2022     01/09/2025    INR 1.3 (H) 04/09/2025    INR 1.9 04/02/2025     (H) 01/09/2025    TSH 0.906 05/18/2023    CHOL 98 (L) 01/09/2025    HDL 44 01/09/2025    LDLCALC 41.0 (L) 01/09/2025    TRIG 65 01/09/2025       Assessment:      1. LVAD (left ventricular assist device) present    2. Heart replaced by heart assist device    3. NICM (nonischemic cardiomyopathy)    4. Subdural hematoma, nontraumatic        Plan:   End stage heart failure, s/p HM3- MAP 93, did not take meds before clinic visit again today. Reminded him how important this is. Plan is if labs are okay and INR therapeutic to proceed with controller and modular cable exchange. ADDENDUM: INR 1.3, will not be exchanging anything today, modular cable is very kinked and braiding torn. Will bring him back in 1 month for nurse visit.     GDMT- not able uptitrate/add very many meds due to fall, syncope, SDH. Gdmt reviewed, reassess changes carefully, as patient is not great with change of meds. MRA on board.     Advance Care Planning     Date: 04/09/2025    He is followed by home psych, which can also transition to palliative care. Not seeing anyone here. Readdressed he is open to seeing palliative care clinic the next time he comes. He is currently  from his wife, Ms. Valentine, but  states she remains his primary HCPOA and his daughter.        Patient is now NYHA II    Recommend 2 gram sodium restriction and 1500cc fluid restriction.  Encourage physical activity with graded exercise program.  Requested patient to weigh themselves daily, and to notify us if their weight increases by more than 3 lbs in 1 day or 5 lbs in 1 week.     Listed for transplant: No

## 2025-04-10 NOTE — PROGRESS NOTES
Equipment:  Any Equipment Needs:  Routine maintenance    Emergency Bag  Emergency Equipment With Patient: Yes   Condition of emergency bag: NO visible damage  Contents of emergency bag: Backup controller, 2 fully charged batteries, 2 battery clips, reference cards    Maintenance Tracking  Patient has monthly checklist today: No  Patient correctly utilizing monthly checklist:  N.A  Educated patient on utilizing monthly checklist correctly and importance of this: Yes    Equipment Inspection  Inspected patient's equipment today: Yes  Equipment clean and free of debris, including locking mechanism: Yes  Cleaned equipment today and educated patient on how to do this: Yes  Manual and visual inspection of driveline: NO   Kinks, rescue tape, tears: No  Clamshell repair: No  Perc lead repair: No    Patient wearing waist strap, vest, jimena vest, concealed carry shirt: Holster vest  Condition of this: NO visible damage but old and worn      Mobile Power Unit  Mobile power unit serial number:[id number]  MPU maintenance due: 10/2025  MPU maintenance completed today:  Yes  Mobile Power Unit 6 month maintenance and AA battery replacement complete. Power on test completed and passed. MPU, MPU patient cable and AC power cord inspected for damage and noted to be in good condition. Equipment cleaned.    Universal Battery Charger  UBC serial number: [id number]  UBC maintenance due:4/2025  UBC maintenance completed today Yes  UBC yearly maintenance complete. UBC and power cable inspected for damage and noted to be in good condition. Power on test and  slot test completed and passed. Equipment cleaned.      Backup Controller and Emergency Backup Battery  EBB due to be charged: 10/2025  EBB charged today and self test completed: Yes  Self test passed:  Yes  EBB changed today: No        Equipment Needs  Equipment issued to patient today in clinic: Yes   Discussed with MCS Coordinator and physician: Yes  Items Under Warranty No VAD  Coordinator Notified Yes  Equipment loaned to patient today: No   Waveforms sent: No   It is medically necessary to ensure patient has properly functioning equipment and wearables to prevent infection, injury or death to patient.      Elder Vest issued to patient- [id number]    Singh identified specific Controllers distributed since March 2024 that may have a lifted screen at the edge, near the Display Button (Figure 1). If exposed to fluid, this lift may allow fluid to enter under the screen, potentially affecting the Controller. This may result in abnormal system behavior, such as visual alarms, issues with the display LED lights, or unresponsive buttons. Note: audible alarms are not affected.  WHAT YOU NEED TO DO:   1. Carefully inspect your Controller at the edge near the Display Button to confirm there is no lift (see below images).     2. If your controllers have a lifted screen, report it to your hospital contact by providing the serial number.     3. Continue to perform the Controller Self-Test by following the instructions in your Patient Handbook on pages 42-43 to confirm your Controller is working. Perform the test at least once per day.     4. Continue to protect your Controller from water or moisture as detailed in the Patient Handbook:   a. Always keep your Controller dry.    b. If you are allowed by your doctor to shower, you must use the Shower Bag for every shower. The   Shower Bag protects the external system components from water and moisture. i. See pages 152-157 of the Patient Handbook on how to use the Shower Bag and the image below of a Controller stored within the Shower Bag. If a replacement shower bag is needed reach out to your hospital contact for component replacements.    c. Never swim, take tub baths, or submerge the Controller in water.

## 2025-04-10 NOTE — PROGRESS NOTES
[facility]    04/10/2025 9:03 AM    Assessment/Plan:    Patient presents today with subtherapeutic  INR.    Assessment of patient findings and chart review: no significant findings     Recommendation for patient's warfarin regimen: Boost dose today to 5mg then resume current maintenance dose    Recommend repeat INR in 1 week  _________________________________________________________________    Rocco ESTRADA Román (69 y.o.) is followed by the [employer] [facility].    Anticoagulation Summary  As of 2025      INR goal:  1.5-2.0   TTR:  58.2% (4 y)   INR used for dosin.3 (2025)   Warfarin maintenance plan:  5 mg (5 mg x 1) every Mon, Fri; 2.5 mg (5 mg x 0.5) all other days   Weekly warfarin total:  22.5 mg   Plan last modified:  Cheryl Barahona, PharmD (2025)   Next INR check:  2025   Target end date:  --    Indications    LVAD (left ventricular assist device) present [Z95.811]                 Anticoagulation Episode Summary       INR check location:  --    Preferred lab:  --    Send INR reminders to:  [facility] COUMADIN LVAD    Comments:  LVAD // (no INR) [facility]  [phone]  //[facility] ph [phone]/ results Lab - [phone]          Anticoagulation Care Providers       Provider Role Specialty Phone number    Pete Garnett MD [facility] Cardiology [phone]

## 2025-04-11 ENCOUNTER — PATIENT MESSAGE (OUTPATIENT)
Dept: TRANSPLANT | Facility: CLINIC | Age: 70
End: 2025-04-11
Payer: MEDICARE

## 2025-04-12 LAB
W AMPHETAMINE, SERUM, QUALITATIVE: NEGATIVE
W BARBITURATE, SERUM, QUALITATIVE: NEGATIVE
W BENZODIAZEPINE, SERUM, QUALITATIVE: NEGATIVE
W COCAINE, SERUM, QUALITATIVE: POSITIVE
W ETHANOL, SERUM: NEGATIVE
W METHADONE, SERUM, QUALITATIVE: NEGATIVE
W OPIATE, SERUM, QUALITATIVE: NEGATIVE
W PHENCYCLIDINE, SERUM, QUALITATIVE: NEGATIVE
W PROPOXYPHENE, SERUM, QUALITATIVE: NEGATIVE
W THC, SERUM, QUALITATIVE: NEGATIVE

## 2025-04-16 ENCOUNTER — LAB REQUISITION (OUTPATIENT)
Dept: LAB | Facility: HOSPITAL | Age: 70
End: 2025-04-16
Payer: MEDICARE

## 2025-04-16 ENCOUNTER — ANTI-COAG VISIT (OUTPATIENT)
Dept: CARDIOLOGY | Facility: CLINIC | Age: 70
End: 2025-04-16
Payer: MEDICARE

## 2025-04-16 DIAGNOSIS — Z95.811 PRESENCE OF HEART ASSIST DEVICE: ICD-10-CM

## 2025-04-16 DIAGNOSIS — Z95.811 LVAD (LEFT VENTRICULAR ASSIST DEVICE) PRESENT: Primary | ICD-10-CM

## 2025-04-16 LAB
INR PPP: 1.4 (ref 0.8–1.2)
PROTHROMBIN TIME: 15.2 SECONDS (ref 9–12.5)

## 2025-04-16 PROCEDURE — 85610 PROTHROMBIN TIME: CPT | Performed by: INTERNAL MEDICINE

## 2025-04-16 PROCEDURE — 93793 ANTICOAG MGMT PT WARFARIN: CPT | Mod: S$GLB,,,

## 2025-04-16 NOTE — PROGRESS NOTES
[facility]    2025 1:46 PM    Assessment/Plan:    Patient presents today with subtherapeutic  INR.    Assessment of patient findings and chart review: no significant findings     Recommendation for patient's warfarin regimen: Increase maintenance dose    Recommend repeat INR Monday  _________________________________________________________________    Rocco ESTRADA Román (69 y.o.) is followed by the [employer] [facility].    Anticoagulation Summary  As of 2025      INR goal:  1.5-2.0   TTR:  57.9% (4 y)   INR used for dosin.4 (2025)   Warfarin maintenance plan:  5 mg (5 mg x 1) every Mon, Wed, Fri; 2.5 mg (5 mg x 0.5) all other days   Weekly warfarin total:  25 mg   Plan last modified:  Cheryl Barahona, PharmD (2025)   Next INR check:  2025   Target end date:  --    Indications    LVAD (left ventricular assist device) present [Z95.811]                 Anticoagulation Episode Summary       INR check location:  --    Preferred lab:  --    Send INR reminders to:  [facility] COUMADIN LVAD    Comments:  LVAD // (no INR) [facility]  [phone]  //[facility] ph [phone]/ results Lab - [phone]          Anticoagulation Care Providers       Provider Role Specialty Phone number    Pete Garnett MD [facility] Cardiology [phone]

## 2025-04-21 ENCOUNTER — TELEPHONE (OUTPATIENT)
Dept: PALLIATIVE MEDICINE | Facility: CLINIC | Age: 70
End: 2025-04-21
Payer: MEDICARE

## 2025-04-21 LAB — INR PPP: 1.6

## 2025-04-22 ENCOUNTER — TELEPHONE (OUTPATIENT)
Dept: PALLIATIVE MEDICINE | Facility: CLINIC | Age: 70
End: 2025-04-22
Payer: MEDICARE

## 2025-04-22 ENCOUNTER — PATIENT MESSAGE (OUTPATIENT)
Dept: PALLIATIVE MEDICINE | Facility: CLINIC | Age: 70
End: 2025-04-22
Payer: MEDICARE

## 2025-04-22 ENCOUNTER — ANTI-COAG VISIT (OUTPATIENT)
Dept: CARDIOLOGY | Facility: CLINIC | Age: 70
End: 2025-04-22
Payer: MEDICARE

## 2025-04-22 DIAGNOSIS — Z95.811 LVAD (LEFT VENTRICULAR ASSIST DEVICE) PRESENT: Primary | ICD-10-CM

## 2025-04-22 PROCEDURE — 93793 ANTICOAG MGMT PT WARFARIN: CPT | Mod: S$GLB,,,

## 2025-04-22 NOTE — TELEPHONE ENCOUNTER
2nd attempt to reach pt regarding scheduling  an appointment in Palliative medicine Clinic, left VM for a call back  on spouse's phone vm not set up on mobile number

## 2025-04-22 NOTE — PROGRESS NOTES
[facility]    04/22/2025    Rocco France (69 y.o.) is followed by the [facility].      Assessment/Plan:    Rocco France presents with a therapeutic INR. Goal INR: 1.5-2.0    Lab Results   Component Value Date    INR 1.6 04/21/2025    INR 1.4 (H) 04/16/2025    INR 1.3 (H) 04/09/2025       Assessment of patient findings per MA/LPN and chart review:   The following significant findings were found:   none    Recommendation for patient's warfarin regimen:   No change was made to warfarin therapy during this visit and patient has been instructed to continue their current warfarin regimen.    Recommended repeat INR in 1 week      Cassidy Snider, PharmD, BCPS  Clinical Pharmacist - [facility]  Preferred Contact: Secure Messaging or In Basket Message

## 2025-04-25 ENCOUNTER — PATIENT MESSAGE (OUTPATIENT)
Dept: TRANSPLANT | Facility: CLINIC | Age: 70
End: 2025-04-25
Payer: MEDICARE

## 2025-04-28 ENCOUNTER — ANTI-COAG VISIT (OUTPATIENT)
Dept: CARDIOLOGY | Facility: CLINIC | Age: 70
End: 2025-04-28
Payer: MEDICARE

## 2025-04-28 DIAGNOSIS — Z95.811 LVAD (LEFT VENTRICULAR ASSIST DEVICE) PRESENT: Primary | ICD-10-CM

## 2025-04-28 LAB — INR PPP: 1.3

## 2025-04-28 PROCEDURE — 93793 ANTICOAG MGMT PT WARFARIN: CPT | Mod: S$GLB,,,

## 2025-04-28 NOTE — PROGRESS NOTES
[facility]    2025 3:47 PM    Assessment/Plan:    Patient presents today with subtherapeutic  INR.    Assessment of patient findings and chart review: reports possibly missing a dose     Recommendation for patient's warfarin regimen: Boost dose today to 7.5mg then resume current maintenance dose    Recommend repeat INR Thursday  _________________________________________________________________    Rocco ESTRADA Román (69 y.o.) is followed by the [facility].    Anticoagulation Summary  As of 2025      INR goal:  1.5-2.0   TTR:  57.7% (4.1 y)   INR used for dosin.3 (2025)   Warfarin maintenance plan:  5 mg (5 mg x 1) every Mon, Wed, Fri; 2.5 mg (5 mg x 0.5) all other days   Weekly warfarin total:  25 mg   Plan last modified:  Cassidy Snider, PharmD (2025)   Next INR check:  2025   Target end date:  --    Indications    LVAD (left ventricular assist device) present [Z95.811]                 Anticoagulation Episode Summary       INR check location:  --    Preferred lab:  --    Send INR reminders to:  [facility] COUMADIN LVAD    Comments:  LVAD // (no INR) [facility]  [phone]  //[facility] ph [phone]/ results Lab - [phone]          Anticoagulation Care Providers       Provider Role Specialty Phone number    Pete Garnett MD [facility] Cardiology [phone]

## 2025-04-30 ENCOUNTER — APPOINTMENT (OUTPATIENT)
Dept: URBAN - METROPOLITAN AREA SURGERY 21 | Age: 70
Setting detail: DERMATOLOGY
End: 2025-05-01

## 2025-04-30 DIAGNOSIS — D49.2 NEOPLASM OF UNSPECIFIED BEHAVIOR OF BONE, SOFT TISSUE, AND SKIN: ICD-10-CM

## 2025-04-30 DIAGNOSIS — L57.0 ACTINIC KERATOSIS: ICD-10-CM

## 2025-04-30 DIAGNOSIS — L73.8 OTHER SPECIFIED FOLLICULAR DISORDERS: ICD-10-CM

## 2025-04-30 DIAGNOSIS — L82.1 OTHER SEBORRHEIC KERATOSIS: ICD-10-CM

## 2025-04-30 DIAGNOSIS — D18.0 HEMANGIOMA: ICD-10-CM

## 2025-04-30 DIAGNOSIS — L20.89 OTHER ATOPIC DERMATITIS: ICD-10-CM

## 2025-04-30 PROBLEM — D18.01 HEMANGIOMA OF SKIN AND SUBCUTANEOUS TISSUE: Status: ACTIVE | Noted: 2025-04-30

## 2025-04-30 PROCEDURE — 17000 DESTRUCT PREMALG LESION: CPT | Mod: 59

## 2025-04-30 PROCEDURE — 99212 OFFICE O/P EST SF 10 MIN: CPT | Mod: 25

## 2025-04-30 PROCEDURE — OTHER COUNSELING: OTHER

## 2025-04-30 PROCEDURE — OTHER LIQUID NITROGEN: OTHER

## 2025-04-30 PROCEDURE — OTHER BIOPSY BY SHAVE METHOD: OTHER

## 2025-04-30 PROCEDURE — 11102 TANGNTL BX SKIN SINGLE LES: CPT

## 2025-04-30 ASSESSMENT — LOCATION SIMPLE DESCRIPTION DERM
LOCATION SIMPLE: LEFT FOREHEAD
LOCATION SIMPLE: RIGHT FOREHEAD
LOCATION SIMPLE: RIGHT LOWER BACK
LOCATION SIMPLE: LEFT HAND
LOCATION SIMPLE: RIGHT PRETIBIAL REGION

## 2025-04-30 ASSESSMENT — LOCATION DETAILED DESCRIPTION DERM
LOCATION DETAILED: RIGHT LATERAL PROXIMAL PRETIBIAL REGION
LOCATION DETAILED: LEFT SUPERIOR FOREHEAD
LOCATION DETAILED: LEFT FOREHEAD
LOCATION DETAILED: RIGHT MEDIAL FOREHEAD
LOCATION DETAILED: RIGHT INFERIOR LATERAL MIDBACK
LOCATION DETAILED: LEFT ULNAR DORSAL HAND

## 2025-04-30 ASSESSMENT — LOCATION ZONE DERM
LOCATION ZONE: HAND
LOCATION ZONE: TRUNK
LOCATION ZONE: FACE
LOCATION ZONE: LEG

## 2025-05-01 ENCOUNTER — ANTI-COAG VISIT (OUTPATIENT)
Dept: CARDIOLOGY | Facility: CLINIC | Age: 70
End: 2025-05-01
Payer: MEDICARE

## 2025-05-01 DIAGNOSIS — Z95.811 LVAD (LEFT VENTRICULAR ASSIST DEVICE) PRESENT: Primary | ICD-10-CM

## 2025-05-01 LAB — INR PPP: 1.7

## 2025-05-01 PROCEDURE — 93793 ANTICOAG MGMT PT WARFARIN: CPT | Mod: S$GLB,,,

## 2025-05-01 NOTE — PROGRESS NOTES
[facility]    2025 3:27 PM    Assessment/Plan:    Patient presents today with therapeutic INR.    Assessment of patient findings and chart review: no significant findings     Recommendation for patient's warfarin regimen: Continue current maintenance dose    Recommend repeat INR Tuesday  _________________________________________________________________    Rocco NATALIE Farnce (69 y.o.) is followed by the [employer] [facility].    Anticoagulation Summary  As of 2025      INR goal:  1.5-2.0   TTR:  57.7% (4.1 y)   INR used for dosin.7 (2025)   Warfarin maintenance plan:  5 mg (5 mg x 1) every Mon, Wed, Fri; 2.5 mg (5 mg x 0.5) all other days   Weekly warfarin total:  25 mg   Plan last modified:  Cassidy Snider, PharmD (2025)   Next INR check:  2025   Target end date:  --    Indications    LVAD (left ventricular assist device) present [Z95.811]                 Anticoagulation Episode Summary       INR check location:  --    Preferred lab:  --    Send INR reminders to:  [facility] COUMADIN LVAD    Comments:  LVAD // (no INR) [facility]  [phone]  //[facility] ph [phone]/ results Lab - [phone]          Anticoagulation Care Providers       Provider Role Specialty Phone number    Pete Garnett MD [facility] Cardiology [phone]

## 2025-05-02 RX ORDER — DIGOXIN 125 MCG
0.12 TABLET ORAL DAILY
Qty: 30 TABLET | Refills: 11 | Status: SHIPPED | OUTPATIENT
Start: 2025-05-02

## 2025-05-06 ENCOUNTER — ANTI-COAG VISIT (OUTPATIENT)
Dept: CARDIOLOGY | Facility: CLINIC | Age: 70
End: 2025-05-06
Payer: MEDICARE

## 2025-05-06 DIAGNOSIS — Z95.811 LVAD (LEFT VENTRICULAR ASSIST DEVICE) PRESENT: Primary | ICD-10-CM

## 2025-05-06 LAB — INR PPP: 2.4

## 2025-05-06 PROCEDURE — 93793 ANTICOAG MGMT PT WARFARIN: CPT | Mod: S$GLB,,,

## 2025-05-06 NOTE — PROGRESS NOTES
[facility]    2025 3:41 PM    Assessment/Plan:    Patient presents today with supratherapeutic INR.    Assessment of patient findings and chart review: no significant findings     Recommendation for patient's warfarin regimen: Hold dose today then resume current maintenance dose    Recommend repeat INR Thursday  _________________________________________________________________    Rocco ESTRADA Román (69 y.o.) is followed by the [employer] [facility].    Anticoagulation Summary  As of 2025      INR goal:  1.5-2.0   TTR:  57.7% (4.1 y)   INR used for dosin.4 (2025)   Warfarin maintenance plan:  5 mg (5 mg x 1) every Mon, Wed, Fri; 2.5 mg (5 mg x 0.5) all other days   Weekly warfarin total:  25 mg   Plan last modified:  Cassidy Snider, PharmD (2025)   Next INR check:  2025   Target end date:  --    Indications    LVAD (left ventricular assist device) present [Z95.811]                 Anticoagulation Episode Summary       INR check location:  --    Preferred lab:  --    Send INR reminders to:  [facility] COUMADIN LVAD    Comments:  LVAD // (no INR) [facility]  [phone]  //[facility] ph [phone]/ results Lab - [phone]          Anticoagulation Care Providers       Provider Role Specialty Phone number    Pete Garnett MD [facility] Cardiology [phone]

## 2025-05-09 ENCOUNTER — ANTI-COAG VISIT (OUTPATIENT)
Dept: CARDIOLOGY | Facility: CLINIC | Age: 70
End: 2025-05-09
Payer: MEDICARE

## 2025-05-09 DIAGNOSIS — Z95.811 LVAD (LEFT VENTRICULAR ASSIST DEVICE) PRESENT: Primary | ICD-10-CM

## 2025-05-09 LAB — INR PPP: 1.9

## 2025-05-09 NOTE — PROGRESS NOTES
[facility]    2025 11:56 AM    Assessment/Plan:    Patient presents today with therapeutic INR.    Assessment of patient findings and chart review: no significant findings     Recommendation for patient's warfarin regimen: Continue current maintenance dose    Recommend repeat INR Monday  _________________________________________________________________    Rocco ESTRADA Román (69 y.o.) is followed by the [employer] [facility].    Anticoagulation Summary  As of 2025      INR goal:  1.5-2.0   TTR:  57.6% (4.1 y)   INR used for dosin.9 (2025)   Warfarin maintenance plan:  5 mg (5 mg x 1) every Mon, Wed, Fri; 2.5 mg (5 mg x 0.5) all other days   Weekly warfarin total:  25 mg   Plan last modified:  Cassidy Snider, PharmD (2025)   Next INR check:  2025   Target end date:  --    Indications    LVAD (left ventricular assist device) present [Z95.811]                 Anticoagulation Episode Summary       INR check location:  --    Preferred lab:  --    Send INR reminders to:  [facility] COUMADIN LVAD    Comments:  LVAD // (no INR) [facility]  [phone]  //[facility] ph [phone]/ results Lab - [phone]          Anticoagulation Care Providers       Provider Role Specialty Phone number    Pete Garnett MD [facility] Cardiology [phone]

## 2025-05-13 ENCOUNTER — ANTI-COAG VISIT (OUTPATIENT)
Dept: CARDIOLOGY | Facility: CLINIC | Age: 70
End: 2025-05-13
Payer: MEDICARE

## 2025-05-13 ENCOUNTER — PATIENT MESSAGE (OUTPATIENT)
Dept: TRANSPLANT | Facility: CLINIC | Age: 70
End: 2025-05-13
Payer: MEDICARE

## 2025-05-13 DIAGNOSIS — Z95.811 LVAD (LEFT VENTRICULAR ASSIST DEVICE) PRESENT: Primary | ICD-10-CM

## 2025-05-13 LAB — INR PPP: 1.7

## 2025-05-13 PROCEDURE — 93793 ANTICOAG MGMT PT WARFARIN: CPT | Mod: S$GLB,,,

## 2025-05-13 NOTE — PROGRESS NOTES
[facility]    2025 8:56 AM    Assessment/Plan:    Patient presents today with therapeutic INR.    Assessment of patient findings and chart review: no significant findings     Recommendation for patient's warfarin regimen: Continue current maintenance dose    Recommend repeat INR in 1 week  _________________________________________________________________    Rocco NATALIE France (69 y.o.) is followed by the [employer] [facility].    Anticoagulation Summary  As of 2025      INR goal:  1.5-2.0   TTR:  57.7% (4.1 y)   INR used for dosin.7 (2025)   Warfarin maintenance plan:  5 mg (5 mg x 1) every Mon, Wed, Fri; 2.5 mg (5 mg x 0.5) all other days   Weekly warfarin total:  25 mg   Plan last modified:  Cassidy Snider, PharmD (2025)   Next INR check:  2025   Target end date:  --    Indications    LVAD (left ventricular assist device) present [Z95.811]                 Anticoagulation Episode Summary       INR check location:  --    Preferred lab:  --    Send INR reminders to:  [facility] COUMADIN LVAD    Comments:  LVAD // (no INR) [facility]  [phone]  //[facility] ph [phone]/ results Lab - [phone]          Anticoagulation Care Providers       Provider Role Specialty Phone number    Pete Garnett MD [facility] Cardiology [phone]

## 2025-05-19 ENCOUNTER — PATIENT MESSAGE (OUTPATIENT)
Dept: TRANSPLANT | Facility: CLINIC | Age: 70
End: 2025-05-19
Payer: MEDICARE

## 2025-05-19 ENCOUNTER — ANTI-COAG VISIT (OUTPATIENT)
Dept: CARDIOLOGY | Facility: CLINIC | Age: 70
End: 2025-05-19
Payer: MEDICARE

## 2025-05-19 DIAGNOSIS — Z95.811 LVAD (LEFT VENTRICULAR ASSIST DEVICE) PRESENT: Primary | ICD-10-CM

## 2025-05-19 LAB — INR PPP: 1.4

## 2025-05-19 PROCEDURE — 93793 ANTICOAG MGMT PT WARFARIN: CPT | Mod: S$GLB,,,

## 2025-05-19 NOTE — PROGRESS NOTES
[facility]    2025 3:52 PM    Assessment/Plan:    Patient presents today with subtherapeutic  INR.    Assessment of patient findings and chart review: no significant findings     Recommendation for patient's warfarin regimen: Boost dose today to 7.5mg then resume current maintenance dose    Recommend repeat INR Thursday  _________________________________________________________________    Rocco ESTRADA Román (69 y.o.) is followed by the [employer] [facility].    Anticoagulation Summary  As of 2025      INR goal:  1.5-2.0   TTR:  57.8% (4.1 y)   INR used for dosin.4 (2025)   Warfarin maintenance plan:  5 mg (5 mg x 1) every Mon, Wed, Fri; 2.5 mg (5 mg x 0.5) all other days   Weekly warfarin total:  25 mg   Plan last modified:  Cassidy Snider, PharmD (2025)   Next INR check:  2025   Target end date:  --    Indications    LVAD (left ventricular assist device) present [Z95.811]                 Anticoagulation Episode Summary       INR check location:  --    Preferred lab:  --    Send INR reminders to:  [facility] COUMADIN LVAD    Comments:  LVAD // (no INR) [facility]  [phone]  //[facility] ph [phone]/ results Lab - [phone]          Anticoagulation Care Providers       Provider Role Specialty Phone number    Pete Garnett MD [facility] Cardiology [phone]

## 2025-05-19 NOTE — PROGRESS NOTES
Pt declined 5/22/25 INR stating he will be out of town. I offered to get pt scheduled for INR while he is out of town, he declined and stated he will have INR 5/26/25 when he is back home.

## 2025-05-21 ENCOUNTER — APPOINTMENT (OUTPATIENT)
Dept: URBAN - METROPOLITAN AREA SURGERY 21 | Age: 70
Setting detail: DERMATOLOGY
End: 2025-05-22

## 2025-05-21 ENCOUNTER — PATIENT MESSAGE (OUTPATIENT)
Dept: TRANSPLANT | Facility: CLINIC | Age: 70
End: 2025-05-21
Payer: MEDICARE

## 2025-05-21 DIAGNOSIS — L82.0 INFLAMED SEBORRHEIC KERATOSIS: ICD-10-CM

## 2025-05-21 DIAGNOSIS — L57.0 ACTINIC KERATOSIS: ICD-10-CM

## 2025-05-21 PROCEDURE — OTHER PATHOLOGY DISCUSSION: OTHER

## 2025-05-21 PROCEDURE — 99212 OFFICE O/P EST SF 10 MIN: CPT

## 2025-05-21 PROCEDURE — OTHER REASSURANCE: OTHER

## 2025-05-21 PROCEDURE — OTHER COUNSELING: OTHER

## 2025-05-21 RX ORDER — ATORVASTATIN CALCIUM 80 MG/1
80 TABLET, FILM COATED ORAL DAILY
Qty: 90 TABLET | Refills: 3 | Status: SHIPPED | OUTPATIENT
Start: 2025-05-21

## 2025-05-21 ASSESSMENT — LOCATION SIMPLE DESCRIPTION DERM
LOCATION SIMPLE: RIGHT LOWER BACK
LOCATION SIMPLE: LEFT FOREHEAD

## 2025-05-21 ASSESSMENT — LOCATION ZONE DERM
LOCATION ZONE: FACE
LOCATION ZONE: TRUNK

## 2025-05-21 ASSESSMENT — LOCATION DETAILED DESCRIPTION DERM
LOCATION DETAILED: RIGHT INFERIOR LATERAL MIDBACK
LOCATION DETAILED: LEFT FOREHEAD

## 2025-05-21 NOTE — TELEPHONE ENCOUNTER
----- Message from NATHANAEL Ramirez sent at 5/20/2025  4:43 PM CDT -----  Regarding: RE: refill  Please refill  ----- Message -----  From: Coty Lozano MA  Sent: 5/20/2025   3:31 PM CDT  To: [facility]  Subject: FW: refill                                         ----- Message -----  From: Rosa Morrison MA  Sent: 5/20/2025  12:30 PM CDT  To: [facility]  Subject: refill                                           Donnell with [employer] is calling to request a refill on pt's atorvastatin (LIPITOR) 80 MG tablet.[facility] [address] [place] [address] Phone: [phone]: [phone]

## 2025-05-27 ENCOUNTER — LAB VISIT (OUTPATIENT)
Dept: LAB | Facility: HOSPITAL | Age: 70
End: 2025-05-27
Payer: MEDICARE

## 2025-05-27 ENCOUNTER — ANTI-COAG VISIT (OUTPATIENT)
Dept: CARDIOLOGY | Facility: CLINIC | Age: 70
End: 2025-05-27
Payer: MEDICARE

## 2025-05-27 ENCOUNTER — CLINICAL SUPPORT (OUTPATIENT)
Dept: TRANSPLANT | Facility: CLINIC | Age: 70
End: 2025-05-27
Payer: MEDICARE

## 2025-05-27 DIAGNOSIS — Z95.811 LVAD (LEFT VENTRICULAR ASSIST DEVICE) PRESENT: Primary | ICD-10-CM

## 2025-05-27 DIAGNOSIS — Z95.811 HEART REPLACED BY HEART ASSIST DEVICE: Primary | ICD-10-CM

## 2025-05-27 DIAGNOSIS — Z95.811 HEART REPLACED BY HEART ASSIST DEVICE: ICD-10-CM

## 2025-05-27 LAB
INR PPP: 2.2 (ref 0.8–1.2)
LDH SERPL-CCNC: 210 U/L (ref 110–260)
PROTHROMBIN TIME: 23.1 SECONDS (ref 9–12.5)

## 2025-05-27 PROCEDURE — 93793 ANTICOAG MGMT PT WARFARIN: CPT | Mod: S$GLB,,,

## 2025-05-27 PROCEDURE — 83615 LACTATE (LD) (LDH) ENZYME: CPT

## 2025-05-27 PROCEDURE — 36415 COLL VENOUS BLD VENIPUNCTURE: CPT

## 2025-05-27 PROCEDURE — 85610 PROTHROMBIN TIME: CPT

## 2025-05-27 NOTE — PROGRESS NOTES
Patient seen in clinic for modular cable and controller exchange now that his INR is therapeutic. Visible damage to modular cable noted on original cable prior to exchange down the length of the modular cable, indicating modular cable exchange was needed, controller also showed visibile signs of damage. Patient educated on what he may feel when modular cable is exchanged. Patient verbalized understanding. Patient was sitting down, modular cable connection was cleaned prior to disconnecting and patient was educated on proper cleaning practices for new cable. Assisted by JACKELINE Valiente VAD tech, patient was seated, Modular cable was was disconnected and new modular cable was connected to patient by myself, VAD Coordinator. All normal pump activities resumed and a self test was completed, controller functioning WNL. All equipment numbers noted. Upon disconnecting cable, patient felt no symptoms at this time.  It is medically necessary to ensure patient has properly functioning equipment and wearables to prevent infection, injury or death to patient.     New Primary Controller: [id number]  New Modular Cable: [id number]

## 2025-05-27 NOTE — PROGRESS NOTES
[facility]    2025 10:49 AM    Assessment/Plan:    Patient presents today with supratherapeutic INR.    Assessment of patient findings and chart review: no significant findings     Recommendation for patient's warfarin regimen: Hold dose today then resume current maintenance dose    Recommend repeat INR Thursday  _________________________________________________________________    Rocco ESTRADA Román (69 y.o.) is followed by the [facility].    Anticoagulation Summary  As of 2025      INR goal:  1.5-2.0   TTR:  57.8% (4.1 y)   INR used for dosin.2 (2025)   Warfarin maintenance plan:  5 mg (5 mg x 1) every Mon, Wed, Fri; 2.5 mg (5 mg x 0.5) all other days   Weekly warfarin total:  25 mg   Plan last modified:  Cassidy Snider, PharmD (2025)   Next INR check:  2025   Target end date:  --    Indications    LVAD (left ventricular assist device) present [Z95.811]                 Anticoagulation Episode Summary       INR check location:  --    Preferred lab:  --    Send INR reminders to:  [facility] COUMADIN LVAD    Comments:  LVAD // (no INR) [facility]  [phone]  //[facility] ph [phone]/ results Lab - [phone]          Anticoagulation Care Providers       Provider Role Specialty Phone number    Pete Garnett MD [facility] Cardiology [phone]

## 2025-05-27 NOTE — PROGRESS NOTES
Patient called and was given lab result, He verified correct coumadin dose, reports having cataract surgery in June, no other changes reported

## 2025-05-29 ENCOUNTER — ANTI-COAG VISIT (OUTPATIENT)
Dept: CARDIOLOGY | Facility: CLINIC | Age: 70
End: 2025-05-29
Payer: MEDICARE

## 2025-05-29 DIAGNOSIS — Z95.811 LVAD (LEFT VENTRICULAR ASSIST DEVICE) PRESENT: Primary | ICD-10-CM

## 2025-05-29 LAB — INR PPP: 1.7

## 2025-05-29 PROCEDURE — 93793 ANTICOAG MGMT PT WARFARIN: CPT | Mod: S$GLB,,,

## 2025-06-03 ENCOUNTER — ANTI-COAG VISIT (OUTPATIENT)
Dept: CARDIOLOGY | Facility: CLINIC | Age: 70
End: 2025-06-03
Payer: MEDICARE

## 2025-06-03 DIAGNOSIS — Z95.811 LVAD (LEFT VENTRICULAR ASSIST DEVICE) PRESENT: Primary | ICD-10-CM

## 2025-06-03 LAB — INR PPP: 1.5

## 2025-06-03 PROCEDURE — 93793 ANTICOAG MGMT PT WARFARIN: CPT | Mod: S$GLB,,,

## 2025-06-09 ENCOUNTER — PATIENT MESSAGE (OUTPATIENT)
Dept: TRANSPLANT | Facility: CLINIC | Age: 70
End: 2025-06-09
Payer: MEDICARE

## 2025-06-12 ENCOUNTER — OFFICE VISIT (OUTPATIENT)
Dept: PALLIATIVE MEDICINE | Facility: CLINIC | Age: 70
End: 2025-06-12
Payer: MEDICARE

## 2025-06-12 ENCOUNTER — ANTI-COAG VISIT (OUTPATIENT)
Dept: CARDIOLOGY | Facility: CLINIC | Age: 70
End: 2025-06-12
Payer: MEDICARE

## 2025-06-12 VITALS
DIASTOLIC BLOOD PRESSURE: 93 MMHG | OXYGEN SATURATION: 100 % | HEART RATE: 67 BPM | WEIGHT: 173.75 LBS | BODY MASS INDEX: 22.3 KG/M2 | SYSTOLIC BLOOD PRESSURE: 126 MMHG

## 2025-06-12 DIAGNOSIS — Z95.811 LVAD (LEFT VENTRICULAR ASSIST DEVICE) PRESENT: Primary | ICD-10-CM

## 2025-06-12 DIAGNOSIS — Z51.5 PALLIATIVE CARE ENCOUNTER: ICD-10-CM

## 2025-06-12 DIAGNOSIS — I50.42 CHRONIC COMBINED SYSTOLIC AND DIASTOLIC HEART FAILURE: ICD-10-CM

## 2025-06-12 LAB — INR PPP: 2.1

## 2025-06-12 PROCEDURE — 99999 PR PBB SHADOW E&M-EST. PATIENT-LVL III: CPT | Mod: PBBFAC,,, | Performed by: STUDENT IN AN ORGANIZED HEALTH CARE EDUCATION/TRAINING PROGRAM

## 2025-06-12 RX ORDER — SOLIFENACIN SUCCINATE 5 MG/1
5 TABLET, FILM COATED ORAL DAILY
COMMUNITY
Start: 2025-04-30

## 2025-06-12 NOTE — PROGRESS NOTES
Patient reports correct Warfarin dose, pt thinks that he may have taken a little extra Warfarin 6/10/25 due to cutting his pill, no other changes reported.
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
## 2025-06-12 NOTE — PROGRESS NOTES
[facility]    2025 4:08 PM    Assessment/Plan:    Patient presents today with supratherapeutic INR.    Assessment of patient findings and chart review: thinks he may have taken extra when cutting pill    Recommendation for patient's warfarin regimen: Hold dose today then resume current maintenance dose    Recommend repeat INR in 1 week  _________________________________________________________________    Rocco ESTRADA Román (69 y.o.) is followed by the [facility].    Anticoagulation Summary  As of 2025      INR goal:  1.5-2.0   TTR:  58.1% (4.2 y)   INR used for dosin.1 (2025)   Warfarin maintenance plan:  5 mg (5 mg x 1) every Mon, Wed, Fri; 2.5 mg (5 mg x 0.5) all other days   Weekly warfarin total:  25 mg   Plan last modified:  Cassidy Snider, PharmD (2025)   Next INR check:  2025   Target end date:  --    Indications    LVAD (left ventricular assist device) present [Z95.811]                 Anticoagulation Episode Summary       INR check location:  --    Preferred lab:  --    Send INR reminders to:  [facility] COUMADIN LVAD    Comments:  LVAD // (no INR) [facility]  [phone]  //[facility] ph [phone]/ results Lab - [phone]          Anticoagulation Care Providers       Provider Role Specialty Phone number    Pete Garnett MD [facility] Cardiology [phone]

## 2025-06-13 ENCOUNTER — PATIENT MESSAGE (OUTPATIENT)
Dept: CARDIOTHORACIC SURGERY | Facility: CLINIC | Age: 70
End: 2025-06-13
Payer: MEDICARE

## 2025-06-19 ENCOUNTER — ANTI-COAG VISIT (OUTPATIENT)
Dept: CARDIOLOGY | Facility: CLINIC | Age: 70
End: 2025-06-19
Payer: MEDICARE

## 2025-06-19 ENCOUNTER — HOSPITAL ENCOUNTER (EMERGENCY)
Facility: HOSPITAL | Age: 70
Discharge: ANOTHER HOSPITAL | End: 2025-06-19
Attending: EMERGENCY MEDICINE
Payer: MEDICARE

## 2025-06-19 ENCOUNTER — TELEPHONE (OUTPATIENT)
Dept: TRANSPLANT | Facility: CLINIC | Age: 70
End: 2025-06-19
Payer: MEDICARE

## 2025-06-19 VITALS
HEIGHT: 74 IN | HEART RATE: 98 BPM | RESPIRATION RATE: 20 BRPM | TEMPERATURE: 99 F | OXYGEN SATURATION: 100 % | BODY MASS INDEX: 22.58 KG/M2 | WEIGHT: 175.94 LBS | DIASTOLIC BLOOD PRESSURE: 62 MMHG | SYSTOLIC BLOOD PRESSURE: 110 MMHG

## 2025-06-19 DIAGNOSIS — A41.9 SEPSIS, DUE TO UNSPECIFIED ORGANISM, UNSPECIFIED WHETHER ACUTE ORGAN DYSFUNCTION PRESENT: Primary | ICD-10-CM

## 2025-06-19 DIAGNOSIS — Z95.811 LVAD (LEFT VENTRICULAR ASSIST DEVICE) PRESENT: Primary | ICD-10-CM

## 2025-06-19 DIAGNOSIS — R73.9 HYPERGLYCEMIA: ICD-10-CM

## 2025-06-19 LAB
ABSOLUTE EOSINOPHIL (OHS): 0.02 K/UL
ABSOLUTE MONOCYTE (OHS): 1.04 K/UL (ref 0.3–1)
ABSOLUTE NEUTROPHIL COUNT (OHS): 16.42 K/UL (ref 1.8–7.7)
ALBUMIN SERPL BCP-MCNC: 2.6 G/DL (ref 3.5–5.2)
ALP SERPL-CCNC: 197 UNIT/L (ref 40–150)
ALT SERPL W/O P-5'-P-CCNC: 9 UNIT/L (ref 10–44)
ANION GAP (OHS): 10 MMOL/L (ref 8–16)
APTT PPP: 34.6 SECONDS (ref 21–32)
AST SERPL-CCNC: 32 UNIT/L (ref 11–45)
BACTERIA #/AREA URNS AUTO: ABNORMAL /HPF
BASOPHILS # BLD AUTO: 0.02 K/UL
BASOPHILS NFR BLD AUTO: 0.1 %
BILIRUB SERPL-MCNC: 2.6 MG/DL (ref 0.1–1)
BILIRUB UR QL STRIP.AUTO: NEGATIVE
BUN SERPL-MCNC: 32 MG/DL (ref 8–23)
CALCIUM SERPL-MCNC: 9.1 MG/DL (ref 8.7–10.5)
CHLORIDE SERPL-SCNC: 87 MMOL/L (ref 95–110)
CLARITY UR: ABNORMAL
CO2 SERPL-SCNC: 25 MMOL/L (ref 23–29)
COLOR UR AUTO: YELLOW
COMMENTS: ABNORMAL
CREAT SERPL-MCNC: 1.9 MG/DL (ref 0.5–1.4)
CTP QC/QA: YES
CTP QC/QA: YES
ERYTHROCYTE [DISTWIDTH] IN BLOOD BY AUTOMATED COUNT: 13.9 % (ref 11.5–14.5)
FIO2: 21 %
GFR SERPLBLD CREATININE-BSD FMLA CKD-EPI: 38 ML/MIN/1.73/M2
GLUCOSE SERPL-MCNC: 515 MG/DL (ref 70–110)
GLUCOSE UR QL STRIP: ABNORMAL
HCT VFR BLD AUTO: 29.8 % (ref 40–54)
HGB BLD-MCNC: 10.5 GM/DL (ref 14–18)
HGB UR QL STRIP: ABNORMAL
HOLD SPECIMEN: NORMAL
HYALINE CASTS UR QL AUTO: 1 /LPF (ref 0–1)
IMM GRANULOCYTES # BLD AUTO: 0.19 K/UL (ref 0–0.04)
IMM GRANULOCYTES NFR BLD AUTO: 1 % (ref 0–0.5)
INR PPP: 1.7 (ref 0.8–1.2)
INR PPP: >4.5
KETONES UR QL STRIP: NEGATIVE
LACTATE SERPL-SCNC: 1.4 MMOL/L (ref 0.5–2.2)
LACTATE SERPL-SCNC: 3.5 MMOL/L (ref 0.5–2.2)
LEUKOCYTE ESTERASE UR QL STRIP: NEGATIVE
LYMPHOCYTES # BLD AUTO: 1.06 K/UL (ref 1–4.8)
Lab: ABNORMAL
MAGNESIUM SERPL-MCNC: 1.7 MG/DL (ref 1.6–2.6)
MCH RBC QN AUTO: 30.1 PG (ref 27–31)
MCHC RBC AUTO-ENTMCNC: 35.2 G/DL (ref 32–36)
MCV RBC AUTO: 85 FL (ref 82–98)
MICROSCOPIC COMMENT: ABNORMAL
MODIFIED ALLEN'S TEST: ABNORMAL
NITRITE UR QL STRIP: NEGATIVE
NOTIFIED BY: ABNORMAL
NT-PROBNP SERPL-MCNC: 3655 PG/ML
NUCLEATED RBC (/100WBC) (OHS): 0 /100 WBC
O2DEVICE: ABNORMAL
PCO2 BLDA: 39.9 MMHG (ref 35–45)
PH SMN: 7.46 [PH] (ref 7.34–7.45)
PH UR STRIP: 6 [PH]
PLATELET # BLD AUTO: 195 K/UL (ref 150–450)
PMV BLD AUTO: 11.8 FL (ref 9.2–12.9)
PO2 BLDA: 33.2 MMHG (ref 40–60)
POC BASE DEFICIT: 4.2 MMOL/L (ref -2–2)
POC HCO3: 27.2 MMOL/L (ref 24–28)
POC MOLECULAR INFLUENZA A AGN: NEGATIVE
POC MOLECULAR INFLUENZA B AGN: NEGATIVE
POC NOTIFIED NOTE: ABNORMAL
POC PERFORMED BY: ABNORMAL
POC SATURATED O2: 64.7 % (ref 95–100)
POC TEMPERATURE: 37 C
POCT GLUCOSE: 311 MG/DL (ref 70–110)
POCT GLUCOSE: 486 MG/DL (ref 70–110)
POCT GLUCOSE: 495 MG/DL (ref 70–110)
POTASSIUM SERPL-SCNC: 4.4 MMOL/L (ref 3.5–5.1)
PROCALCITONIN SERPL-MCNC: 3.28 NG/ML
PROT SERPL-MCNC: 9.6 GM/DL (ref 6–8.4)
PROT UR QL STRIP: ABNORMAL
PROTHROMBIN TIME: 18.4 SECONDS (ref 9–12.5)
PROVIDER NOTIFIED: ABNORMAL
RBC # BLD AUTO: 3.49 M/UL (ref 4.6–6.2)
RBC #/AREA URNS AUTO: 9 /HPF (ref 0–4)
RELATIVE EOSINOPHIL (OHS): 0.1 %
RELATIVE LYMPHOCYTE (OHS): 5.7 % (ref 18–48)
RELATIVE MONOCYTE (OHS): 5.5 % (ref 4–15)
RELATIVE NEUTROPHIL (OHS): 87.6 % (ref 38–73)
SARS-COV-2 RDRP RESP QL NAA+PROBE: NEGATIVE
SODIUM SERPL-SCNC: 122 MMOL/L (ref 136–145)
SP GR UR STRIP: >=1.03
SPECIMEN SOURCE: ABNORMAL
SQUAMOUS #/AREA URNS AUTO: 1 /HPF
UROBILINOGEN UR STRIP-ACNC: 1 EU/DL
WBC # BLD AUTO: 18.75 K/UL (ref 3.9–12.7)
WBC #/AREA URNS AUTO: 1 /HPF (ref 0–5)
YEAST URNS QL MICRO: ABNORMAL /HPF

## 2025-06-19 PROCEDURE — 87635 SARS-COV-2 COVID-19 AMP PRB: CPT | Performed by: EMERGENCY MEDICINE

## 2025-06-19 PROCEDURE — 36415 COLL VENOUS BLD VENIPUNCTURE: CPT | Performed by: EMERGENCY MEDICINE

## 2025-06-19 PROCEDURE — 83880 ASSAY OF NATRIURETIC PEPTIDE: CPT | Performed by: EMERGENCY MEDICINE

## 2025-06-19 PROCEDURE — 80053 COMPREHEN METABOLIC PANEL: CPT | Performed by: EMERGENCY MEDICINE

## 2025-06-19 PROCEDURE — 96375 TX/PRO/DX INJ NEW DRUG ADDON: CPT

## 2025-06-19 PROCEDURE — 63600175 PHARM REV CODE 636 W HCPCS: Performed by: EMERGENCY MEDICINE

## 2025-06-19 PROCEDURE — 87154 CUL TYP ID BLD PTHGN 6+ TRGT: CPT | Performed by: EMERGENCY MEDICINE

## 2025-06-19 PROCEDURE — 93793 ANTICOAG MGMT PT WARFARIN: CPT | Mod: S$GLB,,,

## 2025-06-19 PROCEDURE — 25000003 PHARM REV CODE 250: Performed by: EMERGENCY MEDICINE

## 2025-06-19 PROCEDURE — 80162 ASSAY OF DIGOXIN TOTAL: CPT | Performed by: EMERGENCY MEDICINE

## 2025-06-19 PROCEDURE — 85610 PROTHROMBIN TIME: CPT | Performed by: EMERGENCY MEDICINE

## 2025-06-19 PROCEDURE — 83735 ASSAY OF MAGNESIUM: CPT | Performed by: EMERGENCY MEDICINE

## 2025-06-19 PROCEDURE — 82962 GLUCOSE BLOOD TEST: CPT

## 2025-06-19 PROCEDURE — 99900035 HC TECH TIME PER 15 MIN (STAT)

## 2025-06-19 PROCEDURE — 96374 THER/PROPH/DIAG INJ IV PUSH: CPT

## 2025-06-19 PROCEDURE — 87077 CULTURE AEROBIC IDENTIFY: CPT | Performed by: EMERGENCY MEDICINE

## 2025-06-19 PROCEDURE — 82803 BLOOD GASES ANY COMBINATION: CPT

## 2025-06-19 PROCEDURE — 81003 URINALYSIS AUTO W/O SCOPE: CPT | Performed by: EMERGENCY MEDICINE

## 2025-06-19 PROCEDURE — 84145 PROCALCITONIN (PCT): CPT | Performed by: EMERGENCY MEDICINE

## 2025-06-19 PROCEDURE — 87502 INFLUENZA DNA AMP PROBE: CPT | Mod: 59

## 2025-06-19 PROCEDURE — 85730 THROMBOPLASTIN TIME PARTIAL: CPT | Performed by: EMERGENCY MEDICINE

## 2025-06-19 PROCEDURE — 99284 EMERGENCY DEPT VISIT MOD MDM: CPT | Mod: 25

## 2025-06-19 PROCEDURE — 83605 ASSAY OF LACTIC ACID: CPT | Performed by: EMERGENCY MEDICINE

## 2025-06-19 PROCEDURE — 85025 COMPLETE CBC W/AUTO DIFF WBC: CPT | Performed by: EMERGENCY MEDICINE

## 2025-06-19 RX ORDER — CEFTRIAXONE 2 G/1
2 INJECTION, POWDER, FOR SOLUTION INTRAMUSCULAR; INTRAVENOUS
Status: COMPLETED | OUTPATIENT
Start: 2025-06-19 | End: 2025-06-19

## 2025-06-19 RX ORDER — ACETAMINOPHEN 500 MG
1000 TABLET ORAL
Status: COMPLETED | OUTPATIENT
Start: 2025-06-19 | End: 2025-06-19

## 2025-06-19 RX ADMIN — ACETAMINOPHEN 1000 MG: 500 TABLET ORAL at 05:06

## 2025-06-19 RX ADMIN — INSULIN HUMAN 8 UNITS: 100 INJECTION, SOLUTION PARENTERAL at 08:06

## 2025-06-19 RX ADMIN — CEFTRIAXONE SODIUM 2 G: 2 INJECTION, POWDER, FOR SOLUTION INTRAMUSCULAR; INTRAVENOUS at 06:06

## 2025-06-19 NOTE — PROGRESS NOTES
6/19- Barbara from [facility] reported a verbal PT of 55.9 and an INR >4.5.

## 2025-06-19 NOTE — ED PROVIDER NOTES
"Encounter Date: 6/19/2025       History     Chief Complaint   Patient presents with    Fatigue     EMS reports pt from [place] with c/o falling X3 today, having hiccups, , 18G Left F/Arm, pt has an LVAD. Pt reports he has been feeling weak.     69-year-old male with a history of CLARISSE, diabetes, CHF, left ventricular assist device in 2021 presents to the ER with generalized weakness.  In by EMS, states sugar greater than 500.  Upon arrival, oral temperature is 101.1° F. patient complaining of dysuria, inability to "hold his urine".  Patient states he saw a doctor today, unable to give a urine sample, but had his INR drawn which was greater than 4.5.  He denies chest pain or shortness of breath.  He does endorse a cough and hiccups.  Denies taking any medication for fever prior to arrival.  He denies nausea vomiting or diarrhea, no abdominal pain      Review of patient's allergies indicates:   Allergen Reactions    Pcn [penicillins] Hives     Past Medical History:   Diagnosis Date    Acute respiratory failure requiring reintubation 1/28/2022    CLARISSE (acute kidney injury) 1/11/2021    Diabetes mellitus     Kidney stones      Past Surgical History:   Procedure Laterality Date    CARDIAC CATHETERIZATION  2010    no stents    CARDIAC DEFIBRILLATOR PLACEMENT  2010    CARDIAC DEFIBRILLATOR PLACEMENT      HERNIA REPAIR      IRRIGATION OF MEDIASTINUM N/A 1/14/2021    Procedure: IRRIGATION, MEDIASTINUM;  Surgeon: Zenon Corona MD;  Location: [facility] OR [address] [place] [address];  Service: Cardiovascular;  Laterality: N/A;    KNEE ARTHROSCOPY Right     LEFT VENTRICULAR ASSIST DEVICE Left 1/13/2021    Procedure: INSERTION- HeartMate 3 LEFT VENTRICULAR ASSIST DEVICE ;  Surgeon: Zenon Corona MD;  [facility];  Service: Cardiovascular;  Laterality: Left;    RECONSTRUCTION OF PERICARDIUM N/A 1/14/2021    Procedure: RECONSTRUCTION, PERICARDIUM;  Surgeon: Zenon Corona MD;  Location: [facility] OR [address] [place] [address];  Service: Cardiovascular;  " Laterality: N/A;    RIGHT HEART CATHETERIZATION Right 11/2/2020    Procedure: INSERTION, CATHETER, RIGHT HEART;  Surgeon: Deana Lake MD;  Location: [facility] CATH LAB;  Service: Cardiology;  Laterality: Right;    RIGHT HEART CATHETERIZATION Right 3/24/2022    Procedure: INSERTION, CATHETER, RIGHT HEART;  Surgeon: Manuel Ramos Jr., MD;  Location: [facility] CATH LAB;  Service: Cardiology;  Laterality: Right;    STERNAL WOUND CLOSURE  1/13/2021    Procedure: TEMPORARY CLOSURE OF CHEST;  Surgeon: Zenon Corona MD;  [facility];  Service: Cardiovascular;;    STERNAL WOUND CLOSURE N/A 1/14/2021    Procedure: CLOSURE, WOUND, STERNUM;  Surgeon: Zenon Corona MD;  [facility];  Service: Cardiovascular;  Laterality: N/A;     Family History   Problem Relation Name Age of Onset    Heart attack Mother      Heart failure Brother      Heart attack Brother      Hypertension Brother       Social History[1]  Review of Systems   Constitutional:  Positive for fever.   HENT:  Negative for sore throat.    Respiratory:  Positive for cough. Negative for shortness of breath.    Cardiovascular:  Negative for chest pain.   Gastrointestinal:  Negative for nausea.   Genitourinary:  Negative for dysuria.   Musculoskeletal:  Negative for back pain.   Skin:  Negative for rash.   Neurological:  Negative for weakness.   Hematological:  Does not bruise/bleed easily.   All other systems reviewed and are negative.      Physical Exam     Initial Vitals   BP Pulse Resp Temp SpO2   06/19/25 1742 06/19/25 1733 06/19/25 1733 06/19/25 1733 06/19/25 1733   102/68 89 18 (!) 101.1 °F (38.4 °C) 99 %      MAP       --                Physical Exam    Nursing note and vitals reviewed.  Constitutional: He is not diaphoretic. No distress.   Febrile   HENT:   Head: Normocephalic and atraumatic. Mouth/Throat: Oropharynx is clear and moist.   Eyes: Conjunctivae and EOM are normal. Pupils are equal, round, and reactive to light.   Neck:  Neck supple. No thyromegaly present. No tracheal deviation present. No JVD present.   Normal range of motion.  Cardiovascular:            LVAD in place   Pulmonary/Chest: Breath sounds normal. No stridor.   Abdominal: Abdomen is soft. Bowel sounds are normal. He exhibits no distension. There is no abdominal tenderness. There is no rebound and no guarding.   Musculoskeletal:         General: No tenderness or edema. Normal range of motion.      Cervical back: Normal range of motion and neck supple.     Neurological: He is alert and oriented to person, place, and time. GCS score is 15. GCS eye subscore is 4. GCS verbal subscore is 5. GCS motor subscore is 6.   Skin: Skin is warm. Capillary refill takes less than 2 seconds.         ED Course   Procedures  Labs Reviewed   COMPREHENSIVE METABOLIC PANEL - Abnormal       Result Value    Sodium 122 (*)     Potassium 4.4      Chloride 87 (*)     CO2 25      Glucose 515 (*)     BUN 32 (*)     Creatinine 1.9 (*)     Calcium 9.1      Protein Total 9.6 (*)     Albumin 2.6 (*)     Bilirubin Total 2.6 (*)      (*)     AST 32      ALT 9 (*)     Anion Gap 10      eGFR 38 (*)    LACTIC ACID, PLASMA - Abnormal    Lactic Acid Level 3.5 (*)     Narrative:     Falsely low lactic acid results can be found in samples containing >=13.0 mg/dL total bilirubin and/or >=3.5 mg/dL direct bilirubin.    URINALYSIS, REFLEX TO URINE CULTURE - Abnormal    Color, UA Yellow      Appearance, UA Cloudy (*)     pH, UA 6.0      Spec Grav UA >=1.030 (*)     Protein, UA 2+ (*)     Glucose, UA 4+ (*)     Ketones, UA Negative      Bilirubin, UA Negative      Blood, UA Trace (*)     Nitrites, UA Negative      Urobilinogen, UA 1.0      Leukocyte Esterase, UA Negative     PROTIME-INR - Abnormal    PT 18.4 (*)     INR 1.7 (*)    APTT - Abnormal    PTT 34.6 (*)    NT-PRO NATRIURETIC PEPTIDE - Abnormal    NT-proBNP 3,655 (*)    PROCALCITONIN - Abnormal    Procalcitonin 3.28 (*)    CBC WITH DIFFERENTIAL -  Abnormal    WBC 18.75 (*)     RBC 3.49 (*)     HGB 10.5 (*)     HCT 29.8 (*)     MCV 85      MCH 30.1      MCHC 35.2      RDW 13.9      Platelet Count 195      MPV 11.8      Nucleated RBC 0      Neut % 87.6 (*)     Lymph % 5.7 (*)     Mono % 5.5      Eos % 0.1      Basophil % 0.1      Imm Grans % 1.0 (*)     Neut # 16.42 (*)     Lymph # 1.06      Mono # 1.04 (*)     Eos # 0.02      Baso # 0.02      Imm Grans # 0.19 (*)    URINALYSIS MICROSCOPIC - Abnormal    RBC, UA 9 (*)     WBC, UA 1      Bacteria, UA None      Yeast, UA None      Squamous Epithelial Cells, UA 1      Hyaline Casts, UA 1      Microscopic Comment       POCT GLUCOSE - Abnormal    POCT Glucose 486 (*)    POCT GLUCOSE - Abnormal    POCT Glucose 495 (*)    POCT GLUCOSE - Abnormal    POCT Glucose 311 (*)    LACTIC ACID, PLASMA - Normal    Lactic Acid Level 1.4      Narrative:     Falsely low lactic acid results can be found in samples containing >=13.0 mg/dL total bilirubin and/or >=3.5 mg/dL direct bilirubin.    MAGNESIUM - Normal    Magnesium  1.7     CULTURE, BLOOD   CULTURE, BLOOD   CBC W/ AUTO DIFFERENTIAL    Narrative:     The following orders were created for panel order CBC auto differential.  Procedure                               Abnormality         Status                     ---------                               -----------         ------                     CBC with Differential[[id number]]       Abnormal            Final result                 Please view results for these tests on the individual orders.   GREY TOP URINE HOLD    Extra Tube Hold for add-ons.     DIGOXIN LEVEL   SARS-COV-2 RDRP GENE    POC Rapid COVID Negative       Acceptable Yes     POCT INFLUENZA A/B MOLECULAR    POC Molecular Influenza A Ag Negative      POC Molecular Influenza B Ag Negative       Acceptable Yes            Imaging Results              X-Ray Chest AP Portable (Final result)  Result time 06/19/25 22:04:18      Final result by  Ollie Abreu MD (06/19/25 22:04:18)                   Impression:      No evidence of an acute pulmonary process.      Electronically signed by: Ollie Abreu MD  Date:    06/19/2025  Time:    22:04               Narrative:    EXAMINATION:  XR CHEST AP PORTABLE    CLINICAL HISTORY:  Sepsis;    COMPARISON:  05/20/2023    FINDINGS:  Cardiac silhouette does not appear enlarged.  There is sternotomy change and a left-sided ICD.  No focal infiltrate or pleural effusion.  No acute osseous finding.                        Wet Read by David Elder MD (06/19/25 19:19:48, [facility] Emergency Department, Emergency Medicine)    Sternotomy wires are in place, LVAD in place, internal device present, no obvious pneumonia, pneumothorax or pulmonary edema.                                     Medications   acetaminophen tablet 1,000 mg (1,000 mg Oral Given 6/19/25 1752)   cefTRIAXone injection 2 g (2 g Intravenous Given 6/19/25 1809)   insulin regular injection 8 Units 0.08 mL (8 Units Intravenous Given 6/19/25 2010)     Medical Decision Making  Transfer was initiated by Dr. Pierre.  He spoke with [place] at the transfer center at 1800.  Lab work and antibiotics were also ordered for suspected sepsis on this patient.  At 7:10 p.m. I spoke with Dr. Mittal and he has accepted the patient for transfer.      Amount and/or Complexity of Data Reviewed  Labs: ordered.  Radiology: ordered and independent interpretation performed.    Risk  OTC drugs.  Prescription drug management.                          Medical Decision Making:   Differential Diagnosis:   Fever, UTI, sepsis, DKA, hyperglycemia             Clinical Impression:  Final diagnoses:  [A41.9] Sepsis, due to unspecified organism, unspecified whether acute organ dysfunction present (Primary)  [R73.9] Hyperglycemia          ED Disposition Condition    Transfer to Another Facility Stable                      [1]   Social History  Tobacco Use    Smoking status: Some Days      Types: Cigarettes    Smokeless tobacco: Never   Substance Use Topics    Alcohol use: No        David Elder MD  06/20/25 0058

## 2025-06-19 NOTE — PROGRESS NOTES
[facility]    06/19/2025 9:44 AM    Assessment/Plan:    Patient presents today with supratherapeutic INR.    Assessment of patient findings and chart review: reports decreased appetite and not drinking his ensure lately    Recommendation for patient's warfarin regimen: Hold dose today + serving of greens    Recommend repeat INR tomorrow  _________________________________________________________________    Rocco ESTRADA Román (69 y.o.) is followed by the [facility].    Anticoagulation Summary  As of 6/19/2025      INR goal:  1.5-2.0   TTR:  58.1% (4.2 y)   INR used for dosing:  >4.5 (6/19/2025)   Warfarin maintenance plan:  5 mg (5 mg x 1) every Mon, Wed, Fri; 2.5 mg (5 mg x 0.5) all other days   Weekly warfarin total:  25 mg   Plan last modified:  Cassidy Snider, PharmD (4/16/2025)   Next INR check:  6/20/2025   Target end date:  --    Indications    LVAD (left ventricular assist device) present [Z95.811]                 Anticoagulation Episode Summary       INR check location:  --    Preferred lab:  --    Send INR reminders to:  [facility] COUMADIN LVAD    Comments:  LVAD // (no INR) [facility] 2000 [phone]  //[facility] ph [phone] [phone]/ results Lab - [phone]          Anticoagulation Care Providers       Provider Role Specialty Phone number    Pete Garnett MD [facility] Cardiology [phone]

## 2025-06-19 NOTE — PROGRESS NOTES
Pt advised to hold dose 6/19 + serving of greens, repeat INR 6/20. Lab orders faxed. Pt verbalized understanding.

## 2025-06-19 NOTE — PROGRESS NOTES
Pt confirmed correct dose per calendar. States he does not have an appetite, he has not been drinking his ensure daily. He has been suffering with hiccups for over a week now, went to the ED on Sunday regarding this. SOB due to hiccups reported. No other issues confirmed.

## 2025-06-20 ENCOUNTER — HOSPITAL ENCOUNTER (INPATIENT)
Facility: HOSPITAL | Age: 70
LOS: 10 days | Discharge: HOME HEALTH | DRG: 853 | End: 2025-06-30
Attending: INTERNAL MEDICINE | Admitting: INTERNAL MEDICINE
Payer: MEDICARE

## 2025-06-20 DIAGNOSIS — Z79.01 ANTICOAGULATED: ICD-10-CM

## 2025-06-20 DIAGNOSIS — I50.9 HEART FAILURE: ICD-10-CM

## 2025-06-20 DIAGNOSIS — R78.81 E COLI BACTEREMIA: ICD-10-CM

## 2025-06-20 DIAGNOSIS — I49.9 CARDIAC RHYTHM DISORDER OR DISTURBANCE OR CHANGE: ICD-10-CM

## 2025-06-20 DIAGNOSIS — E11.621 DIABETIC ULCER OF TOE OF RIGHT FOOT ASSOCIATED WITH TYPE 2 DIABETES MELLITUS, WITH NECROSIS OF BONE: ICD-10-CM

## 2025-06-20 DIAGNOSIS — I50.84 END STAGE HEART FAILURE: ICD-10-CM

## 2025-06-20 DIAGNOSIS — L97.514 DIABETIC ULCER OF TOE OF RIGHT FOOT ASSOCIATED WITH TYPE 2 DIABETES MELLITUS, WITH NECROSIS OF BONE: ICD-10-CM

## 2025-06-20 DIAGNOSIS — E11.59 TYPE 2 DIABETES MELLITUS WITH OTHER CIRCULATORY COMPLICATION, UNSPECIFIED WHETHER LONG TERM INSULIN USE: ICD-10-CM

## 2025-06-20 DIAGNOSIS — A41.9 SEPSIS, DUE TO UNSPECIFIED ORGANISM, UNSPECIFIED WHETHER ACUTE ORGAN DYSFUNCTION PRESENT: ICD-10-CM

## 2025-06-20 DIAGNOSIS — E11.9 TYPE 2 DIABETES MELLITUS: ICD-10-CM

## 2025-06-20 DIAGNOSIS — B96.20 E COLI BACTEREMIA: ICD-10-CM

## 2025-06-20 DIAGNOSIS — I50.22 CHRONIC SYSTOLIC CONGESTIVE HEART FAILURE: ICD-10-CM

## 2025-06-20 DIAGNOSIS — M86.171 ACUTE OSTEOMYELITIS OF TOE, RIGHT: ICD-10-CM

## 2025-06-20 DIAGNOSIS — E83.42 HYPOMAGNESEMIA: ICD-10-CM

## 2025-06-20 DIAGNOSIS — A41.9 SEPSIS: ICD-10-CM

## 2025-06-20 DIAGNOSIS — Z95.811 LVAD (LEFT VENTRICULAR ASSIST DEVICE) PRESENT: Primary | ICD-10-CM

## 2025-06-20 DIAGNOSIS — E78.5 HYPERLIPIDEMIA, UNSPECIFIED HYPERLIPIDEMIA TYPE: ICD-10-CM

## 2025-06-20 DIAGNOSIS — E78.2 MIXED HYPERLIPIDEMIA: ICD-10-CM

## 2025-06-20 DIAGNOSIS — E11.9 TYPE 2 DIABETES MELLITUS WITHOUT COMPLICATION, WITHOUT LONG-TERM CURRENT USE OF INSULIN: ICD-10-CM

## 2025-06-20 DIAGNOSIS — R78.81 BACTEREMIA: ICD-10-CM

## 2025-06-20 PROBLEM — E44.0 MODERATE MALNUTRITION: Status: ACTIVE | Noted: 2025-06-20

## 2025-06-20 PROBLEM — F17.200 TOBACCO DEPENDENCY: Status: ACTIVE | Noted: 2025-06-20

## 2025-06-20 PROBLEM — R57.9 SHOCK: Status: ACTIVE | Noted: 2025-06-20

## 2025-06-20 LAB
ABSOLUTE EOSINOPHIL (OHS): 0.06 K/UL
ABSOLUTE MONOCYTE (OHS): 1.47 K/UL (ref 0.3–1)
ABSOLUTE NEUTROPHIL COUNT (OHS): 13.36 K/UL (ref 1.8–7.7)
ACB COMPLEX DNA BLD POS QL NAA+NON-PROBE: NOT DETECTED
ALBUMIN SERPL BCP-MCNC: 2.6 G/DL (ref 3.5–5.2)
ALLENS TEST: ABNORMAL
ALP SERPL-CCNC: 216 UNIT/L (ref 40–150)
ALT SERPL W/O P-5'-P-CCNC: 19 UNIT/L (ref 10–44)
ANION GAP (OHS): 11 MMOL/L (ref 8–16)
ANION GAP (OHS): 11 MMOL/L (ref 8–16)
ANION GAP (OHS): 12 MMOL/L (ref 8–16)
APTT PPP: 38.6 SECONDS (ref 21–32)
AST SERPL-CCNC: 42 UNIT/L (ref 11–45)
B FRAGILIS DNA BLD POS QL NAA+PROBE: NOT DETECTED
BASOPHILS # BLD AUTO: 0.05 K/UL
BASOPHILS NFR BLD AUTO: 0.3 %
BILIRUB DIRECT SERPL-MCNC: 1.5 MG/DL (ref 0.1–0.3)
BILIRUB SERPL-MCNC: 2.7 MG/DL (ref 0.1–1)
BNP SERPL-MCNC: 615 PG/ML (ref 0–99)
BUN SERPL-MCNC: 32 MG/DL (ref 8–23)
BUN SERPL-MCNC: 33 MG/DL (ref 8–23)
BUN SERPL-MCNC: 42 MG/DL (ref 8–23)
C ALBICANS DNA BLD POS QL NAA+PROBE: NOT DETECTED
C AURIS DNA BLD POS QL NAA+NON-PROBE: NOT DETECTED
C GATTII+NEOFOR DNA CSF QL NAA+NON-PROBE: NOT DETECTED
C GLABRATA DNA BLD POS QL NAA+PROBE: NOT DETECTED
C KRUSEI DNA BLD POS QL NAA+PROBE: NOT DETECTED
C PARAP DNA BLD POS QL NAA+PROBE: NOT DETECTED
C TROPICLS DNA BLD POS QL NAA+PROBE: NOT DETECTED
CALCIUM SERPL-MCNC: 8.6 MG/DL (ref 8.7–10.5)
CALCIUM SERPL-MCNC: 9.1 MG/DL (ref 8.7–10.5)
CALCIUM SERPL-MCNC: 9.1 MG/DL (ref 8.7–10.5)
CHLORIDE SERPL-SCNC: 90 MMOL/L (ref 95–110)
CHLORIDE SERPL-SCNC: 90 MMOL/L (ref 95–110)
CHLORIDE SERPL-SCNC: 91 MMOL/L (ref 95–110)
CO2 SERPL-SCNC: 26 MMOL/L (ref 23–29)
COLISTIN RES MCR-1 ISLT/SPM QL: NOT DETECTED
CREAT SERPL-MCNC: 1.7 MG/DL (ref 0.5–1.4)
CRP SERPL-MCNC: 332.9 MG/L
DIGOXIN SERPL-MCNC: 0.4 NG/ML (ref 0.8–2)
E CLOAC COMP DNA BLD POS QL NAA+PROBE: NOT DETECTED
E COLI DNA BLD POS QL NAA+PROBE: DETECTED
E FAECALIS+OTHR E SP RRNA BLD POS FISH: NOT DETECTED
E FAECIUM HSP60 BLD POS QL PROBE: NOT DETECTED
EAG (OHS): 309 MG/DL (ref 68–131)
ENTEROBACTERALES DNA BLD POS NAA+N-PRB: ABNORMAL
ERYTHROCYTE [DISTWIDTH] IN BLOOD BY AUTOMATED COUNT: 13.9 % (ref 11.5–14.5)
ESBL CFT TO CFT-CLAV IC RTO BD POS IMP: NOT DETECTED
GFR SERPLBLD CREATININE-BSD FMLA CKD-EPI: 43 ML/MIN/1.73/M2
GLUCOSE SERPL-MCNC: 325 MG/DL (ref 70–110)
GLUCOSE SERPL-MCNC: 328 MG/DL (ref 70–110)
GLUCOSE SERPL-MCNC: 384 MG/DL (ref 70–110)
GP B STREP DNA CSF QL NAA+NON-PROBE: NOT DETECTED
HAEM INFLU DNA CSF QL NAA+NON-PROBE: NOT DETECTED
HBA1C MFR BLD: 12.4 % (ref 4–5.6)
HCO3 UR-SCNC: 29.2 MMOL/L (ref 24–28)
HCT VFR BLD AUTO: 34.1 % (ref 40–54)
HGB BLD-MCNC: 11.2 GM/DL (ref 14–18)
IMM GRANULOCYTES # BLD AUTO: 0.18 K/UL (ref 0–0.04)
IMM GRANULOCYTES NFR BLD AUTO: 1.1 % (ref 0–0.5)
IMP CARBAPENEMASE ISLT QL IA.RAPID: NOT DETECTED
INR PPP: 1.8 (ref 0.8–1.2)
K OXYTOCA OMPA BLD POS QL PROBE: NOT DETECTED
KLEBSIELLA SP DNA BLD POS QL NAA+NON-PRB: NOT DETECTED
KLEBSIELLA SP DNA BLD POS QL NAA+NON-PRB: NOT DETECTED
KPC CARBAPENEMASE ISLT QL IA.RAPID: NOT DETECTED
L MONOCYTOG DNA CSF QL NAA+NON-PROBE: NOT DETECTED
LACTATE SERPL-SCNC: 1.7 MMOL/L (ref 0.5–2.2)
LACTATE SERPL-SCNC: 1.8 MMOL/L (ref 0.5–2.2)
LDH SERPL-CCNC: 280 U/L (ref 110–260)
LYMPHOCYTES # BLD AUTO: 1.53 K/UL (ref 1–4.8)
MAGNESIUM SERPL-MCNC: 1.8 MG/DL (ref 1.6–2.6)
MCH RBC QN AUTO: 28.9 PG (ref 27–31)
MCHC RBC AUTO-ENTMCNC: 32.8 G/DL (ref 32–36)
MCV RBC AUTO: 88 FL (ref 82–98)
MECA+MECC NOSE QL NAA+PROBE: ABNORMAL
MECA+MECC+MREJ ISLT/SPM QL: ABNORMAL
N MEN DNA CSF QL NAA+NON-PROBE: NOT DETECTED
NDM CARBAPENEMASE ISLT QL IA.RAPID: NOT DETECTED
NUCLEATED RBC (/100WBC) (OHS): 0 /100 WBC
OSMOLALITY UR: 272 MOSM/KG (ref 50–1200)
OXA-48-LIKE CRBPNASE ISLT QL IA.RAPID: NOT DETECTED
P AERUGINOSA DNA BLD POS QL NAA+PROBE: NOT DETECTED
PCO2 BLDA: 45.4 MMHG (ref 35–45)
PH SMN: 7.42 [PH] (ref 7.35–7.45)
PHOSPHATE SERPL-MCNC: 3.2 MG/DL (ref 2.7–4.5)
PLATELET # BLD AUTO: 202 K/UL (ref 150–450)
PMV BLD AUTO: 11.5 FL (ref 9.2–12.9)
PO2 BLDA: 28 MMHG (ref 40–60)
POC BE: 5 MMOL/L (ref -2–2)
POC SATURATED O2: 53 % (ref 95–100)
POC TCO2: 31 MMOL/L (ref 24–29)
POCT GLUCOSE: 289 MG/DL (ref 70–110)
POCT GLUCOSE: 336 MG/DL (ref 70–110)
POCT GLUCOSE: 351 MG/DL (ref 70–110)
POCT GLUCOSE: 358 MG/DL (ref 70–110)
POCT GLUCOSE: 405 MG/DL (ref 70–110)
POCT GLUCOSE: 431 MG/DL (ref 70–110)
POTASSIUM SERPL-SCNC: 3.8 MMOL/L (ref 3.5–5.1)
POTASSIUM SERPL-SCNC: 3.9 MMOL/L (ref 3.5–5.1)
POTASSIUM SERPL-SCNC: 3.9 MMOL/L (ref 3.5–5.1)
PREALB SERPL-MCNC: 5 MG/DL (ref 20–43)
PROT SERPL-MCNC: 9.5 GM/DL (ref 6–8.4)
PROTEUS SP DNA BLD POS QL NAA+PROBE: NOT DETECTED
PROTHROMBIN TIME: 18.6 SECONDS (ref 9–12.5)
RBC # BLD AUTO: 3.88 M/UL (ref 4.6–6.2)
RELATIVE EOSINOPHIL (OHS): 0.4 %
RELATIVE LYMPHOCYTE (OHS): 9.2 % (ref 18–48)
RELATIVE MONOCYTE (OHS): 8.8 % (ref 4–15)
RELATIVE NEUTROPHIL (OHS): 80.2 % (ref 38–73)
S AUREUS DNA BLD POS QL NAA+PROBE: NOT DETECTED
S ENT+BONG DNA STL QL NAA+NON-PROBE: NOT DETECTED
S EPIDERMIDIS HSP60 BLD POS QL PROBE: NOT DETECTED
S LUGDUNENSIS SODA BLD POS QL PROBE: NOT DETECTED
S MALTOPH DNA BLD POS QL NAA+PROBE: NOT DETECTED
S MARCESCENS DNA BLD POS QL NAA+PROBE: NOT DETECTED
S PNEUM DNA CSF QL NAA+NON-PROBE: NOT DETECTED
S PYOGENES HSP60 BLD POS QL PROBE: NOT DETECTED
SAMPLE: ABNORMAL
SITE: ABNORMAL
SODIUM SERPL-SCNC: 127 MMOL/L (ref 136–145)
SODIUM SERPL-SCNC: 128 MMOL/L (ref 136–145)
SODIUM SERPL-SCNC: 128 MMOL/L (ref 136–145)
STAPH SP TUF BLD POS QL PROBE: NOT DETECTED
STREPTOCOCCUS SP TUF BLD POS QL PROBE: NOT DETECTED
VAN(A+B+C1+C2) GENES ISLT/SPM: ABNORMAL
VIM CARBAPENEMASE ISLT QL IA.RAPID: NOT DETECTED
WBC # BLD AUTO: 16.65 K/UL (ref 3.9–12.7)

## 2025-06-20 PROCEDURE — 83935 ASSAY OF URINE OSMOLALITY: CPT

## 2025-06-20 PROCEDURE — 63600175 PHARM REV CODE 636 W HCPCS: Performed by: STUDENT IN AN ORGANIZED HEALTH CARE EDUCATION/TRAINING PROGRAM

## 2025-06-20 PROCEDURE — 25000003 PHARM REV CODE 250: Performed by: STUDENT IN AN ORGANIZED HEALTH CARE EDUCATION/TRAINING PROGRAM

## 2025-06-20 PROCEDURE — 82247 BILIRUBIN TOTAL: CPT | Performed by: STUDENT IN AN ORGANIZED HEALTH CARE EDUCATION/TRAINING PROGRAM

## 2025-06-20 PROCEDURE — 20600001 HC STEP DOWN PRIVATE ROOM

## 2025-06-20 PROCEDURE — 27000248 HC VAD-ADDITIONAL DAY

## 2025-06-20 PROCEDURE — 84100 ASSAY OF PHOSPHORUS: CPT | Performed by: STUDENT IN AN ORGANIZED HEALTH CARE EDUCATION/TRAINING PROGRAM

## 2025-06-20 PROCEDURE — 86140 C-REACTIVE PROTEIN: CPT | Performed by: STUDENT IN AN ORGANIZED HEALTH CARE EDUCATION/TRAINING PROGRAM

## 2025-06-20 PROCEDURE — 63600175 PHARM REV CODE 636 W HCPCS

## 2025-06-20 PROCEDURE — 85025 COMPLETE CBC W/AUTO DIFF WBC: CPT | Performed by: STUDENT IN AN ORGANIZED HEALTH CARE EDUCATION/TRAINING PROGRAM

## 2025-06-20 PROCEDURE — 80053 COMPREHEN METABOLIC PANEL: CPT

## 2025-06-20 PROCEDURE — 93750 INTERROGATION VAD IN PERSON: CPT | Mod: ,,, | Performed by: INTERNAL MEDICINE

## 2025-06-20 PROCEDURE — 83605 ASSAY OF LACTIC ACID: CPT | Performed by: STUDENT IN AN ORGANIZED HEALTH CARE EDUCATION/TRAINING PROGRAM

## 2025-06-20 PROCEDURE — 84134 ASSAY OF PREALBUMIN: CPT | Performed by: STUDENT IN AN ORGANIZED HEALTH CARE EDUCATION/TRAINING PROGRAM

## 2025-06-20 PROCEDURE — 25000003 PHARM REV CODE 250

## 2025-06-20 PROCEDURE — 83605 ASSAY OF LACTIC ACID: CPT

## 2025-06-20 PROCEDURE — 83735 ASSAY OF MAGNESIUM: CPT | Performed by: STUDENT IN AN ORGANIZED HEALTH CARE EDUCATION/TRAINING PROGRAM

## 2025-06-20 PROCEDURE — 85730 THROMBOPLASTIN TIME PARTIAL: CPT | Performed by: STUDENT IN AN ORGANIZED HEALTH CARE EDUCATION/TRAINING PROGRAM

## 2025-06-20 PROCEDURE — 36415 COLL VENOUS BLD VENIPUNCTURE: CPT | Performed by: STUDENT IN AN ORGANIZED HEALTH CARE EDUCATION/TRAINING PROGRAM

## 2025-06-20 PROCEDURE — 97161 PT EVAL LOW COMPLEX 20 MIN: CPT

## 2025-06-20 PROCEDURE — 97116 GAIT TRAINING THERAPY: CPT

## 2025-06-20 PROCEDURE — 85610 PROTHROMBIN TIME: CPT | Performed by: STUDENT IN AN ORGANIZED HEALTH CARE EDUCATION/TRAINING PROGRAM

## 2025-06-20 PROCEDURE — 36415 COLL VENOUS BLD VENIPUNCTURE: CPT

## 2025-06-20 PROCEDURE — 97165 OT EVAL LOW COMPLEX 30 MIN: CPT

## 2025-06-20 PROCEDURE — 83036 HEMOGLOBIN GLYCOSYLATED A1C: CPT | Performed by: STUDENT IN AN ORGANIZED HEALTH CARE EDUCATION/TRAINING PROGRAM

## 2025-06-20 PROCEDURE — 63600175 PHARM REV CODE 636 W HCPCS: Performed by: NURSE PRACTITIONER

## 2025-06-20 PROCEDURE — 83880 ASSAY OF NATRIURETIC PEPTIDE: CPT | Performed by: STUDENT IN AN ORGANIZED HEALTH CARE EDUCATION/TRAINING PROGRAM

## 2025-06-20 PROCEDURE — 99222 1ST HOSP IP/OBS MODERATE 55: CPT | Mod: ,,, | Performed by: NURSE PRACTITIONER

## 2025-06-20 PROCEDURE — 83615 LACTATE (LD) (LDH) ENZYME: CPT | Performed by: STUDENT IN AN ORGANIZED HEALTH CARE EDUCATION/TRAINING PROGRAM

## 2025-06-20 PROCEDURE — 92610 EVALUATE SWALLOWING FUNCTION: CPT

## 2025-06-20 PROCEDURE — 99223 1ST HOSP IP/OBS HIGH 75: CPT | Mod: ,,, | Performed by: INTERNAL MEDICINE

## 2025-06-20 RX ORDER — FUROSEMIDE 10 MG/ML
80 INJECTION INTRAMUSCULAR; INTRAVENOUS EVERY 12 HOURS
Status: DISCONTINUED | OUTPATIENT
Start: 2025-06-20 | End: 2025-06-20

## 2025-06-20 RX ORDER — FUROSEMIDE 10 MG/ML
80 INJECTION INTRAMUSCULAR; INTRAVENOUS 2 TIMES DAILY
Status: DISCONTINUED | OUTPATIENT
Start: 2025-06-20 | End: 2025-06-20

## 2025-06-20 RX ORDER — GLUCAGON 1 MG
1 KIT INJECTION
Status: DISCONTINUED | OUTPATIENT
Start: 2025-06-20 | End: 2025-06-24

## 2025-06-20 RX ORDER — INSULIN ASPART 100 [IU]/ML
0-5 INJECTION, SOLUTION INTRAVENOUS; SUBCUTANEOUS
Status: DISCONTINUED | OUTPATIENT
Start: 2025-06-20 | End: 2025-06-20

## 2025-06-20 RX ORDER — AMLODIPINE BESYLATE 10 MG/1
10 TABLET ORAL DAILY
Status: DISCONTINUED | OUTPATIENT
Start: 2025-06-20 | End: 2025-06-30 | Stop reason: HOSPADM

## 2025-06-20 RX ORDER — IBUPROFEN 200 MG
24 TABLET ORAL
Status: DISCONTINUED | OUTPATIENT
Start: 2025-06-20 | End: 2025-06-24

## 2025-06-20 RX ORDER — IBUPROFEN 200 MG
16 TABLET ORAL
Status: DISCONTINUED | OUTPATIENT
Start: 2025-06-20 | End: 2025-06-24

## 2025-06-20 RX ORDER — WARFARIN 2.5 MG/1
2.5 TABLET ORAL DAILY
Status: DISCONTINUED | OUTPATIENT
Start: 2025-06-20 | End: 2025-06-22

## 2025-06-20 RX ORDER — INSULIN GLARGINE 100 [IU]/ML
12 INJECTION, SOLUTION SUBCUTANEOUS DAILY
Status: DISCONTINUED | OUTPATIENT
Start: 2025-06-20 | End: 2025-06-21

## 2025-06-20 RX ORDER — CHLORPROMAZINE HYDROCHLORIDE 25 MG/ML
25 INJECTION INTRAMUSCULAR ONCE
Status: COMPLETED | OUTPATIENT
Start: 2025-06-20 | End: 2025-06-20

## 2025-06-20 RX ORDER — CHLORPROMAZINE HYDROCHLORIDE 25 MG/1
25 TABLET, FILM COATED ORAL 3 TIMES DAILY PRN
Status: DISCONTINUED | OUTPATIENT
Start: 2025-06-20 | End: 2025-06-22

## 2025-06-20 RX ORDER — MIRTAZAPINE 30 MG/1
30 TABLET, FILM COATED ORAL NIGHTLY
Status: DISCONTINUED | OUTPATIENT
Start: 2025-06-20 | End: 2025-06-30 | Stop reason: HOSPADM

## 2025-06-20 RX ORDER — CEFEPIME HYDROCHLORIDE 2 G/1
2 INJECTION, POWDER, FOR SOLUTION INTRAVENOUS
Status: DISCONTINUED | OUTPATIENT
Start: 2025-06-20 | End: 2025-06-21

## 2025-06-20 RX ORDER — INSULIN ASPART 100 [IU]/ML
4 INJECTION, SOLUTION INTRAVENOUS; SUBCUTANEOUS
Status: DISCONTINUED | OUTPATIENT
Start: 2025-06-20 | End: 2025-06-21

## 2025-06-20 RX ORDER — INSULIN ASPART 100 [IU]/ML
4 INJECTION, SOLUTION INTRAVENOUS; SUBCUTANEOUS
Status: DISCONTINUED | OUTPATIENT
Start: 2025-06-20 | End: 2025-06-20

## 2025-06-20 RX ORDER — ATORVASTATIN CALCIUM 40 MG/1
80 TABLET, FILM COATED ORAL DAILY
Status: DISCONTINUED | OUTPATIENT
Start: 2025-06-20 | End: 2025-06-30 | Stop reason: HOSPADM

## 2025-06-20 RX ORDER — INSULIN ASPART 100 [IU]/ML
0-5 INJECTION, SOLUTION INTRAVENOUS; SUBCUTANEOUS
Status: DISCONTINUED | OUTPATIENT
Start: 2025-06-20 | End: 2025-06-30 | Stop reason: HOSPADM

## 2025-06-20 RX ADMIN — INSULIN GLARGINE 12 UNITS: 100 INJECTION, SOLUTION SUBCUTANEOUS at 10:06

## 2025-06-20 RX ADMIN — AMLODIPINE BESYLATE 10 MG: 10 TABLET ORAL at 02:06

## 2025-06-20 RX ADMIN — CEFEPIME 2 G: 2 INJECTION, POWDER, FOR SOLUTION INTRAVENOUS at 04:06

## 2025-06-20 RX ADMIN — ATORVASTATIN CALCIUM 80 MG: 40 TABLET, FILM COATED ORAL at 02:06

## 2025-06-20 RX ADMIN — INSULIN ASPART 5 UNITS: 100 INJECTION, SOLUTION INTRAVENOUS; SUBCUTANEOUS at 10:06

## 2025-06-20 RX ADMIN — CHLORPROMAZINE HYDROCHLORIDE 25 MG: 25 TABLET, FILM COATED ORAL at 04:06

## 2025-06-20 RX ADMIN — FUROSEMIDE 80 MG: 10 INJECTION, SOLUTION INTRAVENOUS at 03:06

## 2025-06-20 RX ADMIN — WARFARIN SODIUM 2.5 MG: 2.5 TABLET ORAL at 04:06

## 2025-06-20 RX ADMIN — VANCOMYCIN HYDROCHLORIDE 1500 MG: 1.5 INJECTION, POWDER, LYOPHILIZED, FOR SOLUTION INTRAVENOUS at 06:06

## 2025-06-20 RX ADMIN — INSULIN ASPART 3 UNITS: 100 INJECTION, SOLUTION INTRAVENOUS; SUBCUTANEOUS at 09:06

## 2025-06-20 RX ADMIN — INSULIN ASPART 5 UNITS: 100 INJECTION, SOLUTION INTRAVENOUS; SUBCUTANEOUS at 04:06

## 2025-06-20 RX ADMIN — CHLORPROMAZINE HYDROCHLORIDE 25 MG: 25 INJECTION INTRAMUSCULAR at 10:06

## 2025-06-20 RX ADMIN — INSULIN ASPART 4 UNITS: 100 INJECTION, SOLUTION INTRAVENOUS; SUBCUTANEOUS at 04:06

## 2025-06-20 RX ADMIN — MIRTAZAPINE 30 MG: 30 TABLET, FILM COATED ORAL at 03:06

## 2025-06-20 RX ADMIN — MIRTAZAPINE 30 MG: 30 TABLET, FILM COATED ORAL at 09:06

## 2025-06-20 NOTE — ASSESSMENT & PLAN NOTE
BG goal 140-180    Start Lantus 12 units once daily (0.3 u/kg dosing)  Start Novolog 4 units TID with meals (basal/prandial match)  Low Dose Correction Scale  BG monitoring ac/hs    ** Please call Endocrine for any BG related issues **      Lab Results   Component Value Date    HGBA1C 12.4 (H) 06/20/2025

## 2025-06-20 NOTE — PROGRESS NOTES
"Pharmacokinetic Initial Assessment: IV Vancomycin    Assessment/Plan:    Initiate intravenous vancomycin with loading dose of 1500 mg once with subsequent doses when random concentrations are less than 20 mcg/mL  Desired empiric serum trough concentration is 15 to 20 mcg/mL  Draw vancomycin random level on 6/21/2025  at 0500.  Pharmacy will continue to follow and monitor vancomycin.      Please contact pharmacy at extension [phone] with any questions regarding this assessment.     Thank you for the consult,   Rafal Frost       Patient brief summary:  Rocco France is a 69 y.o. male initiated on antimicrobial therapy with IV Vancomycin for treatment of suspected bacteremia    Drug Allergies:   Review of patient's allergies indicates:   Allergen Reactions    Pcn [penicillins] Hives       Actual Body Weight:   74.3kg    Renal Function:   Estimated Creatinine Clearance: 38.6 mL/min (A) (based on SCr of 1.9 mg/dL (H)).,     Dialysis Method (if applicable):  N/A    CBC (last 72 hours):  Recent Labs   Lab Result Units 06/19/25  1751   WBC K/uL 18.75*   HGB gm/dL 10.5*   HCT % 29.8*   Platelet Count K/uL 195   Lymph % % 5.7*   Mono % % 5.5   Eos % % 0.1   Basophil % % 0.1       Metabolic Panel (last 72 hours):  Recent Labs   Lab Result Units 06/19/25  1751 06/19/25  1756   Sodium mmol/L 122*  --    Potassium mmol/L 4.4  --    Chloride mmol/L 87*  --    CO2 mmol/L 25  --    Glucose mg/dL 515*  --    Glucose, UA   --  4+*   BUN mg/dL 32*  --    Creatinine mg/dL 1.9*  --    Albumin g/dL 2.6*  --    Bilirubin Total mg/dL 2.6*  --    ALP unit/L 197*  --    AST unit/L 32  --    ALT unit/L 9*  --    Magnesium  mg/dL 1.7  --        Drug levels (last 3 results):  No results for input(s): "VANCOMYCINRA", "VANCORANDOM", "VANCOMYCINPE", "VANCOPEAK", "VANCOMYCINTR", "VANCOTROUGH" in the last 72 hours.    Microbiologic Results:  Microbiology Results (last 7 days)       ** No results found for the last 168 hours. **            "

## 2025-06-20 NOTE — PLAN OF CARE
Recommendations     Recommendation/Intervention:   1. Continue diabetic diet as tolerated     - please continue to document PO % intake via flowsheets     2. Recommend boost glucose control chocolate TID   3. Encourage good intake      4. RD to monitor weight, labs, intake, tolerance     Goals:   1. % nutritional needs met with diet during admission     2. Maintain weight during admission  Nutrition Goal Status: new  Communication of RD Recs: other (comment) (POC)     Nutrition Discharge Planning      Nutrition Discharge Planning: Therapeutic diet (comments), Oral supplement regimen (comments)  Therapeutic diet (comments): cardiac diabetic diet  Oral supplement regimen (comments): oral nutrition supplement of choice and MVI

## 2025-06-20 NOTE — ASSESSMENT & PLAN NOTE
BNP elevated   LVAD with no low-flow alarms     On exam patient wall all hard to assess.  No JVD lower extremity edema appreciated.     Patient denies PND   Or orthopnea.    --   will trial IV dose of Lasix and   Closely monitor urine output.

## 2025-06-20 NOTE — PT/OT/SLP EVAL
Occupational Therapy  Co- Evaluation  PT present for co- eval due to pt's multiple medical comorbidities and functional/cognition deficits requiring two skilled therapists to appropriately progress pt's musculoskeletal strength, neuromuscular control, and pain/ activity tolerance while taking into consideration medical acuity and pt safety.    Name: Rocco France  MRN: [id number]  Admitting Diagnosis: Type 2 diabetes mellitus  Recent Surgery: * No surgery found *      Recommendations:     Discharge Recommendations: Low Intensity Therapy  Discharge Equipment Recommendations:  walker, rolling  Barriers to discharge:  None    Assessment:     Rocco France is a 69 y.o. male with a medical diagnosis of Type 2 diabetes mellitus.  He presents with c/o hungry and Hiccups however tolerated room level fxl mob well. Performance deficits affecting function: weakness, impaired endurance, impaired cardiopulmonary response to activity, weakness, impaired endurance, impaired cardiopulmonary response to activity, gait instability, impaired balance, impaired self care skills.      Rehab Prognosis: Good; patient would benefit from acute skilled OT services to address these deficits and reach maximum level of function.       Plan:     Patient to be seen 3 x/week to address the above listed problems via self-care/home management, therapeutic activities, therapeutic exercises, neuromuscular re-education  Plan of Care Expires: 07/05/25  Plan of Care Reviewed with: patient    Subjective     Chief Complaint: Hunger and Hiccups  Patient/Family Comments/goals: Pt.reports he is hungry     Occupational Profile:  Living Environment: Lives with spouse in a [facility] 3STE with 0HR, t/s combo   Previous level of function: Ind in all ADLs and fxl mob, Ind in LVAD mgmt    Reports 3 falls in past week  Equipment Used at Home:  SPC, shower chair, SW   Assistance upon Discharge:     Pain/Comfort:  Pain Rating 1: 0/10  Pain Rating Post-Intervention 1:  0/10    Patients cultural, spiritual, Jainism conflicts given the current situation: no    Objective:     Communicated with: Nurse  prior to session.  Patient found HOB elevated with telemetry, PureWick, LVAD upon OT entry to room.    General Precautions: Standard, LVAD, fall, aspiration  Orthopedic Precautions: N/A   Braces: N/A  Respiratory Status: Room air    Occupational Performance:    Bed Mobility:    Patient completed Scooting/Bridging with stand by assistance  Patient completed Supine to Sit with stand by assistance    Functional Mobility/Transfers:  Patient completed Sit <> Stand Transfer with contact guard assistance  with  no assistive device   Functional Mobility: Pt demonstrated functional mobility training to simulate household at room level (~20ft) with no AD with contact guard assistance and no LOB and good visual search and navigation strategies.     Activities of Daily Living:  Grooming: supervision seated at EOB for facial hygiene at EOB   LVAD mgmt : Remain on wall power for session    Cognitive/Visual Perceptual:  Cognitive/Psychosocial Skills:     -       Oriented to: Person, Place, Time, and Situation   -       Follows Commands/attention:Follows multistep  commands  -       Communication: clear/fluent  -       Memory: No Deficits noted  -       Safety awareness/insight to disability: impaired   -       Mood/Affect/Coping skills/emotional control: Appropriate to situation    Physical Exam:  Upper Extremity Range of Motion:     -       Right Upper Extremity: WFL  -       Left Upper Extremity: WFL  Upper Extremity Strength:    -       Right Upper Extremity: WFL  -       Left Upper Extremity: WFL   Strength:    -       Right Upper Extremity: WFL  -       Left Upper Extremity: WFL    AMPAC 6 Click ADL:  AMPAC Total Score: 19    Treatment & Education:  Pt educated on role of occupational therapy, POC, and safety during ADLs and functional mobility. Pt and OT discussed importance of safe,  continued mobility to optimize daily living skills. Pt verbalized understanding. Pt given instruction to call for medical staff/nurse for assistance.     Patient left sitting edge of bed with all lines intact and call button in reach    GOALS:   Multidisciplinary Problems       Occupational Therapy Goals          Problem: Occupational Therapy    Goal Priority Disciplines Outcome Interventions   Occupational Therapy Goal     OT, PT/OT Progressing    Description: Goals to be met by: 7/5/25     Patient will increase functional independence with ADLs by performing:    LE Dressing with Modified [place].  Grooming while standing at sink with Modified [place].  Toileting from toilet with Modified [place] for hygiene and clothing management.   Supine to sit with Modified [place].  Toilet transfer to toilet with Modified [place].                         DME Justifications:  Rocco's mobility limitation cannot be sufficiently resolved by the use of a cane. His functional mobility deficit can be sufficiently resolved with the use of a Rolling Walker. Patient's mobility limitation significantly impairs their ability to participate in one of more activities of daily living.  The use of a RW will significantly improve the patient's ability to participate in MRADLS and the patient will use it on regular basis in the home.     History:     Past Medical History:   Diagnosis Date    Acute respiratory failure requiring reintubation 1/28/2022    CLARISSE (acute kidney injury) 1/11/2021    Diabetes mellitus     Kidney stones          Past Surgical History:   Procedure Laterality Date    CARDIAC CATHETERIZATION  2010    no stents    CARDIAC DEFIBRILLATOR PLACEMENT  2010    CARDIAC DEFIBRILLATOR PLACEMENT      HERNIA REPAIR      IRRIGATION OF MEDIASTINUM N/A 1/14/2021    Procedure: IRRIGATION, MEDIASTINUM;  Surgeon: Zenon Corona MD;  Location: [facility] OR [address] [place] [address];  Service: Cardiovascular;  Laterality: N/A;    KNEE  ARTHROSCOPY Right     LEFT VENTRICULAR ASSIST DEVICE Left 1/13/2021    Procedure: INSERTION- HeartMate 3 LEFT VENTRICULAR ASSIST DEVICE ;  Surgeon: Zenon Corona MD;  [facility];  Service: Cardiovascular;  Laterality: Left;    RECONSTRUCTION OF PERICARDIUM N/A 1/14/2021    Procedure: RECONSTRUCTION, PERICARDIUM;  Surgeon: Zenon Corona MD;  [facility];  Service: Cardiovascular;  Laterality: N/A;    RIGHT HEART CATHETERIZATION Right 11/2/2020    Procedure: INSERTION, CATHETER, RIGHT HEART;  Surgeon: Deana Lake MD;  Location: [facility] CATH LAB;  Service: Cardiology;  Laterality: Right;    RIGHT HEART CATHETERIZATION Right 3/24/2022    Procedure: INSERTION, CATHETER, RIGHT HEART;  Surgeon: Manuel Ramos Jr., MD;  Location: [facility] CATH LAB;  Service: Cardiology;  Laterality: Right;    STERNAL WOUND CLOSURE  1/13/2021    Procedure: TEMPORARY CLOSURE OF CHEST;  Surgeon: Zenon Corona MD;  [facility];  Service: Cardiovascular;;    STERNAL WOUND CLOSURE N/A 1/14/2021    Procedure: CLOSURE, WOUND, STERNUM;  Surgeon: Zenon Corona MD;  [facility];  Service: Cardiovascular;  Laterality: N/A;       Time Tracking:     OT Date of Treatment: 06/20/25  OT Start Time: 0932  OT Stop Time: 0952  OT Total Time (min): 20 min    Billable Minutes:Evaluation 10  Therapeutic Activity 10    6/20/2025

## 2025-06-20 NOTE — CONSULTS
[facility] - Cardiology Stepdown    Wound Care     Patient Name:  Rocco France  MRN:  [id number]  Date: 6/20/2025  Diagnosis: <principal problem not specified>     History:  Past Medical History:   Diagnosis Date    Acute respiratory failure requiring reintubation 1/28/2022    CLARISSE (acute kidney injury) 1/11/2021    Diabetes mellitus     Kidney stones      Social History[1]  Precautions:  Allergies as of 06/19/2025 - Reviewed 06/19/2025   Allergen Reaction Noted    Pcn [penicillins] Hives 09/25/2015       WOC Assessment Details / Treatment:    Patient seen for wound care: New Consult   Chart reviewed for this encounter.   Labs:   WBC (K/uL)   Date Value   06/19/2025 18.75 (H)   01/09/2025 8.03   10/10/2024 9.43     Glucose (mg/dL)   Date Value   06/19/2025 515 (HH)   04/09/2025 194 (H)   01/09/2025 114 (H)   10/10/2024 133 (H)     Albumin (g/dL)   Date Value   06/19/2025 2.6 (L)   04/09/2025 3.1 (L)   01/09/2025 3.0 (L)   10/10/2024 2.5 (L)       Johnathon Score: 16    Narrative:  Pt seen for WC consultation and agreed to assessment. Pt awake and alert lying in bed.   Chart reviewed for this encounter.   See Flow Sheet for additional documentation and media.    Right great toe: Pt states he is diabetic. Removed previous foam border, saturated with malodorous drainage. Cleansed with vashe. Wound bed black/ purple boggy with creamy dark drainage. Maceration to madan wound. Applied hydrofera blue to wound bed, wrapped with cast padding, secured with ACE.         RECOMMENDATIONS:  Bedside nurse assess for acute changes (purulence, increased redness/swelling, increased drainage, malodor, increased pain, pallor, necrosis) please contact physician on any acute changes.      Recs for podiatry consult made to cardiology team due to wound location, appearance, and hx of DM. Also rec possible cultures due to odor and drainage.     Right great toe: Cleanse with vashe, pat dry with gauze, apply hydrofera blue ready to wound bed, wrap  with cast padding, secure with ACE. Change every 3 days and PRN soiling.     Bedside nursing to continue care, dressing changes, & continue monitoring.  Bedside nursing to maintain pressure injury prevention interventions, (PIP).     Recommendations made to primary team for above plan.    Thank you for the consult.          06/20/25 1300        Wound Diabetic Ulcer Right plantar Toe, first   No Date First Assessed or Time First Assessed found.   Primary Wound Type: Diabetic Ulcer  Side: Right  Orientation: plantar  Location: Toe, first   Wound Image     Dressing Appearance Intact;Saturated   Drainage Amount Moderate   Drainage Characteristics/Odor Creamy;Serosanguineous;Tan;Malodorous   Appearance Black;Purple;Slough   Periwound Area Macerated   Care Cleansed with:;Other (see comments)  (vashe)   Dressing Removed;Foam;Applied;Other (comment)  (hydrofera blue, cast padding, ace)                          [1]   Social History  Socioeconomic History    Marital status:    Tobacco Use    Smoking status: Some Days     Types: Cigarettes    Smokeless tobacco: Never   Substance and Sexual Activity    Alcohol use: No

## 2025-06-20 NOTE — ASSESSMENT & PLAN NOTE
Patient's blood pressure range in the last 24 hours was: BP  Min: 82/0  Max: 123/67.The patient's inpatient anti-hypertensive regimen is listed below:  Current Antihypertensives       Plan  - BP is controlled, no changes needed to their regimen

## 2025-06-20 NOTE — PLAN OF CARE
Problem: SLP  Goal: SLP Goal  Description: Short Term Goals:   1. Pt will participate in an ongoing assessment to determine the least restrictive and safest diet with possible updated goals to follow pending results.    Outcome: Progressing   Bedside swallow study completed. Recommend: regular diet (patient to self select items he feels he can tolerate with missing dentition), thin liquids, ONE SIP AT A TIME, and standard aspiration precautions. ST will follow up.

## 2025-06-20 NOTE — CONSULTS
[facility] - Cardiology Stepdown  Adult Nutrition  Consult Note    SUMMARY     Recommendations    Recommendation/Intervention:   1. Continue diabetic diet as tolerated     - please continue to document PO % intake via flowsheets     2. Recommend boost glucose control chocolate TID   3. Encourage good intake      4. RD to monitor weight, labs, intake, tolerance    Goals:   1. % nutritional needs met with diet during admission     2. Maintain weight during admission  Nutrition Goal Status: new  Communication of RD Recs: other (comment) (POC)    Nutrition Discharge Planning     Nutrition Discharge Planning: Therapeutic diet (comments), Oral supplement regimen (comments)  Therapeutic diet (comments): cardiac diabetic diet  Oral supplement regimen (comments): oral nutrition supplement of choice and MVI    Assessment and Plan    Endocrine  Moderate malnutrition  Malnutrition Type:  Context: chronic illness  Level: moderate    Related to (etiology):   Physiological causes resulting in anorexia or diminished intake     Signs and Symptoms (as evidenced by):   Muscle and fat depletion      Malnutrition Characteristic Summary:  Subcutaneous Fat (Malnutrition): moderate depletion  Muscle Mass (Malnutrition): moderate depletion    Interventions/Recommendations (treatment strategy):  Collaboration with other providers  ONS    Nutrition Diagnosis Status:   New         Malnutrition Assessment    Malnutrition Context: chronic illness  Malnutrition Level: moderate          Subcutaneous Fat (Malnutrition): moderate depletion  Muscle Mass (Malnutrition): moderate depletion   Orbital Region (Subcutaneous Fat Loss): moderate depletion  Upper Arm Region (Subcutaneous Fat Loss): moderate depletion  Thoracic and Lumbar Region: moderate depletion   [place] (Muscle Loss): moderate depletion  Clavicle Bone Region (Muscle Loss): moderate depletion  Clavicle and Acromion Bone Region (Muscle Loss): moderate depletion  Dorsal Hand  "(Muscle Loss): moderate depletion  Patellar Region (Muscle Loss): moderate depletion  Anterior Thigh Region (Muscle Loss): moderate depletion  Posterior Calf Region (Muscle Loss): moderate depletion     Reason for Assessment    Reason For Assessment: consult (nutritional assessment)  Diagnosis: diabetes diagnosis/complications (Type 2 diabetes mellitus)  General Information Comments: Pt admitted with type 2 diabetes mellitus. PMHx: DM 2, kidney stones, CLARISSE, Acute respiratory failure requiring reintubation, stage D CHF due to NICM underwent HM3 implantation 1/13/2021, HTN, evolving right cerebral acute subdural hematoma, acute basal cistern subarachnoid hemorrhage. No recent surgical hx. LVAD present. No edema noted. No wounds noted. No GI s/s - BM: 6/18. Pt reported having a good appetite. Diet was advanced after lunch. Having a poor intake - PO: 0-25%. Minor wt fluctuations, overall stable. NFPE completed 6/20.    Nutrition/Diet History    Nutrition Intake History: 1 meal and snack  Spiritual, Cultural Beliefs, Sabianism Practices, Values that Affect Care: no  Food Allergies: NKFA  Factors Affecting Nutritional Intake: decreased appetite  Nutrition-related SDOH: None Identified    Anthropometrics    Height: 6' 2" (188 cm)  Height (inches): 74 in  Weight: 74.3 kg (163 lb 12.8 oz)  Weight (lb): 163.8 lb  Weight Method: Standard Scale  Ideal Body Weight (IBW), Male: 190 lb  % Ideal Body Weight, Male (lb): 86.21 %  BMI (Calculated): 21  BMI Grade: less than 23 (older than 65 years) - underweight  Usual Body Weight (UBW), kg:  (pt unsure)    Lab/Procedures/Meds    Pertinent Labs Reviewed: reviewed  Pertinent Labs Comments: H/H 10.5/29.8 low, Na 128 low, Cl 90, BUN 32 high, creatinine 1.7 high, GFR 43 low, glucose 328 high, albumin 2.6 low, ALT 9 low  Pertinent Medications Reviewed: reviewed  Pertinent Medications Comments: amlodipine, atorvastatin, cefepime, chlorpromazine, insulin lantus, mirtazapine, vancomycin, " warfarin    Estimated/Assessed Needs    Weight Used For Calorie Calculations: 74.3 kg (163 lb 12.8 oz)  Energy Calorie Requirements (kcal): 2051 kcal  Energy Need Method: Bennett-St Jeor (* 1.3)  Protein Requirements:  g/pro (1.0-1.5 g/kg)  Weight Used For Protein Calculations: 74.3 kg (163 lb 12.8 oz)        RDA Method (mL): 2051  CHO Requirement: 256 g    Nutrition Prescription Ordered    Current Diet Order: diabetic    Evaluation of Received Nutrient/Fluid Intake    I/O: 120/700 on 6/19-20  Energy Calories Required: not meeting needs  Protein Required: not meeting needs  Fluid Required: other (see comments) (POC)  Tolerance: tolerating  % Intake of Estimated Energy Needs: 0 - 25 %  % Meal Intake: 0 - 25 %    Nutrition Risk    Level of Risk/Frequency of Follow-up: moderate (1-2/week)     Monitor and Evaluation    Monitor and Evaluation: Energy intake, Food and beverage intake, Protein intake, Carbohydrate intake, Diet order, Weight, Electrolyte and renal panel, Gastrointestinal profile, Glucose/endocrine profile, Inflammatory profile, Lipid profile, Nutrition focused physical findings, Skin (NFPE completed 6/20)     Nutrition Follow-Up    RD Follow-up?: Yes

## 2025-06-20 NOTE — PROGRESS NOTES
Admit Note     Met with pt to assess needs. Patient is a 69 y.o.  male, admitted for generalize weakness, falls at home, INR and sugar both elevated, per medical record. Pt received LVAD 1/2021.     Patient admitted on 6/20/2025.  At this time, patient presents as alert and oriented x4 with fatigued affect. Caregiver not present at time of visit.    Household/Family Systems     Patient resides with patient's spouse, at    [address] [place] [address].    1 hour and 45 minutes from [facility]    Support system includes spouse, siblings, adult children, adult step son and nephew. Pt does not have dependents that are in need of being cared for.      Patients primary caregiver is self with support from spouse when indicated.    Pt's cell: [phone]    Additional contacts:  Meme Román (spouse, HCPA, drives at home) [phone]  Laura Barron (Daughter) [phone] (Lives in [place])  Desi Blanco (pt's sister, lives near pt) [phone]    During admission, patient's caregiver plans to visit.  Confirmed patient and patients caregivers do have access to reliable transportation.    Cognitive Status/Learning     Patient reports reading ability as 12th grade and states patient does not have difficulty with hearing. The pt reports difficulty with seeing  and wears glasses to read. Pt reports short term memory issues, and he has to stay focused.  Due to strokes, pt has some paralysis on right side which makes writing challenging. Patient reports patient learns best by visual and one on one and reports additional time and support is needed to learn new information.  Needed: no. Highest education level: 12th grade.    Vocation/Disability   .  Working for Income: no, pt receives Disability.  Patient used to be a , however due to heart issues the pt became disabled in 2007.   Pt's spouse is retired.     Adherence     Patient reports a medium level of adherence to patients health care regimen.  "Pt reports he does not take medications all the time due to he "might be busy." Adherence counseling and education provided. Patient verbalizes understanding.    Substance Use    Patient reports the following substance usage.    Tobacco: pt reports he smokes a cigarette when he gets an urge to do so. Pt reports last use was a month ago.  Alcohol: pt reports he drinks an occasional beer.  Illicit Drugs/Non-prescribed Medications: none, pt denies any use.    Patient states clear understanding of the potential impact of substance use.  Substance abstinence/cessation counseling, education and resources provided and reviewed.     Services Utilizing/ADLS    Infusion Service: Prior to admission, patient utilizing? no  Home Health: Prior to admission, patient utilizing? Yes, Home Health ,  [phone], fax [phone].  Pt reports skilled nurse goes over meds and takes vital signs. Pt has labs done at hospital. Pt would like to continue with HH when discharged and would like to use the same company.   DME: Prior to admission, yes, cane (pt tries not to use the cane often, worried about dependence), and shower chair.   Pulmonary/Cardiac Rehab: Prior to admission, no  Dialysis:  Prior to admission, no  Transplant Specialty Pharmacy:  Prior to admission, no    Prior to admission, patient reports patient was independent  with ADLS and was driving.  Pt reports he takes his time when getting ready to go somewhere. Patient reports patient is able to care for himself at this time. Patient indictes a willingness to care for self once medically cleared to do so.    Insurance/Medications    Insured by   Payer/Plan Subscr  Sex Relation Sub. Ins. ID Effective Group Num   1. [employer] MANAGED* ELANA ACEVEDO 1955 Male Self [id number]                                   [address]      Primary Insurance (for UNOS reporting): Medicare & Choice  Secondary Insurance (for UNOS reporting): none    NOTE: pt " "reports ability to afford medications now and at discharge.    Living Will/Healthcare Power of     Patient states patient has a LW and/or HCPA.  Pt's spouse is his primary agent, dgtr Kedra is secondary agent. Documents in Epic under Media tab.    Coping/Mental Health    Patient reports he's coping well with hospital stay.  Pt does state he is "disgusted" with all the "poking." Patient denies mental health difficulties.  The pt reports he attends [employer] at [facility]. Support provided.    Discharge Planning    At time of discharge, patient plans to return to his own home under the care of self and spouse. Patients wife or dgtr will transport patient. Per rounds today, expected discharge date has not been medically determined at this time. Patient verbalizes understanding and is involved in treatment planning and discharge process.    Additional Concerns    Patients denies additional needs and or concerns at this time.  Pt is being followed for needs, education, resources, information, emotional support, supportive counseling and for supportive and skilled discharge plan of care.  providing ongoing psychosocial support, education, resources and discharge planning as needed.  SW remains available.    "

## 2025-06-20 NOTE — PROGRESS NOTES
Pharmacist Renal Dose Adjustment Note    Rocco France is a 69 y.o. male being treated with the medication cefepime      Patient Data:    Vital Signs (Most Recent):  Temp: 99 °F (37.2 °C) (06/20/25 0010)  Pulse: 93 (06/20/25 0047)  BP: (!) 82/0 (06/20/25 0012)  SpO2: 96 % (06/20/25 0010) Vital Signs (72h Range):  Temp:  [99 °F (37.2 °C)-101.1 °F (38.4 °C)]   Pulse:  [64-98]   Resp:  [16-20]   BP: ()/(0-83)   SpO2:  [82 %-100 %]      Recent Labs   Lab 06/19/25  1751   CREATININE 1.9*     Serum creatinine: 1.9 mg/dL (H) 06/19/25 1751  Estimated creatinine clearance: 38.6 mL/min (A)    Medication:cefepime dose: 2g frequency q8h will be changed to medication:cefepime dose:2g frequency:q12h    Pharmacist's Name: Rafal Frost  Pharmacist's Extension: [phone]

## 2025-06-20 NOTE — ASSESSMENT & PLAN NOTE
This patient does have evidence of infective focus  My overall impression is sepsis with Acute kidney injury.  Source: Respiratory Infection, Urinary Tract Infection, and Skin and Soft Tissue Infection: Deep Wound Infection  Antibiotics given-   Antibiotics (72h ago, onward)      None          Latest lactate reviewed-  Recent Labs   Lab 06/19/25 2044   LACTATE 3.5*     Organ dysfunction indicated by Acute kidney injury and Acute heart failure    Fluid challenge Contraindicated- Fluid bolus is contraindicated in this patient due to End Stage Liver Disease and Congestive Heart Failure     Will Not start Pressors- Levophed for MAP of 65  Source control achieved by:   -- Starting broad-spectrum antibiotics  after both urine and blood cultures obtained  --  gentle diuresis given sepsis     repeat lactic acid pending

## 2025-06-20 NOTE — PT/OT/SLP EVAL
Speech Language Pathology Evaluation  Bedside Swallow    Patient Name:  Rocco France   MRN:  [id number]  [id number]/[id number] A    Admitting Diagnosis: <principal problem not specified>    Recommendations:                 General Recommendations:  follow up  Diet recommendations:  Regular Diet - IDDSI Level 7, Thin liquids - IDDSI Level 0   Aspiration Precautions: 1 small sip at a time, HOB to 90 degrees, Remain upright 30 minutes post meal, and Strict aspiration precautions   General Precautions: Standard, aspiration, LVAD, fall  Communication strategies:  none  Discharge recommendations:   (likely no needs)   Barriers to Discharge:  None    Assessment:     Rocco France is a 69 y.o. male with adequate tolerance of regular solids and thin liquids with aspiration precautions in place. ST will follow up 2/2 patient with inconsistent, dry cough with consecutive sips of thin and to assess tolerance of regular solids 2/2 patient in the process of getting teeth pulled.     History:     Past Medical History:   Diagnosis Date    Acute respiratory failure requiring reintubation 1/28/2022    CLARISSE (acute kidney injury) 1/11/2021    Diabetes mellitus     Kidney stones        Past Surgical History:   Procedure Laterality Date    CARDIAC CATHETERIZATION  2010    no stents    CARDIAC DEFIBRILLATOR PLACEMENT  2010    CARDIAC DEFIBRILLATOR PLACEMENT      HERNIA REPAIR      IRRIGATION OF MEDIASTINUM N/A 1/14/2021    Procedure: IRRIGATION, MEDIASTINUM;  Surgeon: Zenon Corona MD;  Location: [facility] OR [address] [place] [address];  Service: Cardiovascular;  Laterality: N/A;    KNEE ARTHROSCOPY Right     LEFT VENTRICULAR ASSIST DEVICE Left 1/13/2021    Procedure: INSERTION- HeartMate 3 LEFT VENTRICULAR ASSIST DEVICE ;  Surgeon: Zenon Corona MD;  Location: [address];  Service: Cardiovascular;  Laterality: Left;    RECONSTRUCTION OF PERICARDIUM N/A 1/14/2021    Procedure: RECONSTRUCTION, PERICARDIUM;  Surgeon: Zenon Corona MD;  Location: [facility] OR [address] [place] [address];   "Service: Cardiovascular;  Laterality: N/A;    RIGHT HEART CATHETERIZATION Right 11/2/2020    Procedure: INSERTION, CATHETER, RIGHT HEART;  Surgeon: Deana Lake MD;  Location: [facility] CATH LAB;  Service: Cardiology;  Laterality: Right;    RIGHT HEART CATHETERIZATION Right 3/24/2022    Procedure: INSERTION, CATHETER, RIGHT HEART;  Surgeon: Manuel Ramos Jr., MD;  Location: [facility] CATH LAB;  Service: Cardiology;  Laterality: Right;    STERNAL WOUND CLOSURE  1/13/2021    Procedure: TEMPORARY CLOSURE OF CHEST;  Surgeon: Zenon Corona MD;  Location: [facility] OR [facility] FLR;  Service: Cardiovascular;;    STERNAL WOUND CLOSURE N/A 1/14/2021    Procedure: CLOSURE, WOUND, STERNUM;  Surgeon: Zenon Corona MD;  Location: [facility] OR [facility] FLR;  Service: Cardiovascular;  Laterality: N/A;       MD note: HPI: 70 yo M with  type 2 diabetes, hypertension,stage D CHF due to NICM underwent HM3 implantation 1/13/2021 with sternal closure 1/14/2021.  He was admitted for syncope 1/27/2022 at which time he was found to have an evolving right cerebral acute subdural hematoma and acute basal cistern subarachnoid hemorrhage.   Patient presented to outside hospital emergency room with generalized weakness.  Patient is a poor historian.  He reports falling 3 times at home and has had weakness over the past week.   EMS called, noted sugar greater than 500.  Upon arrival, oral temperature is 101.1° F. patient complaining of dysuria, inability to "hold his urine".  Patient states he saw a doctor today, unable to give a urine sample, but had his INR drawn which was greater than 4.5.    He denies chest pain or shortness of breath.  He does endorse a cough and hiccups.  Denies taking any medication for fever prior to arrival.  He denies nausea vomiting or diarrhea, no abdominal pain   Additionally patient denies PND, orthopnea, abdominal pain, abdominal pressure, chest pain, syncope or presyncope.    Chest X-Rays: No evidence of an acute pulmonary " process.    Prior diet: reports regular/thin; reports recently getting teeth pulled and 'doesn't eat anything too tough'    Subjective     SLP communicated with RN prior to entry. RN cleared SLP to administer po trials. RN reports patient with hiccups and coughing on water this AM.     Objective:     Oral Musculature Evaluation  Oral Musculature: WFL  Dentition: scattered dentition (in the process of getting teeth pulled)  Mucosal Quality: adequate  Oral Labial Strength and Mobility: WFL  Volitional Cough: elicited  Volitional Swallow: timely  Voice Prior to PO Intake: clear  Oral Musculature Comments: patient with hiccups    Bedside Swallow Eval:   Consistencies Assessed:  Thin liquid sips of water via cup and straw   Puree tsp bites of pudding   Solids bites of abhijit cracker     Oral Phase:   WFL  Patient noted to impulsively take large, quick sips despite cues to take single, small sips at a time.      Pharyngeal Phase:   no overt clinical signs/symptoms of aspiration  no overt clinical signs/symptoms of pharyngeal dysphagia     Compensatory Strategies  None     Treatment: SLP provided patient education on SLP role, s/s and risks of aspiraiton, safe swallow precautions, importance of taking single/small sips, and POC. Patient v/u of all discussed. White board updated. SLP communicated recommendations with RN.     Goals:   Multidisciplinary Problems       SLP Goals          Problem: SLP    Goal Priority Disciplines Outcome   SLP Goal     SLP Progressing   Description: Short Term Goals:   1. Pt will participate in an ongoing assessment to determine the least restrictive and safest diet with possible updated goals to follow pending results.                         Plan:     Patient to be seen:  3 x/week   Plan of Care expires:  07/19/25  Plan of Care reviewed with:  patient   SLP Follow-Up:  Yes       Time Tracking:     SLP Treatment Date:   06/20/25  Speech Start Time:  1304  Speech Stop Time:  1317     Speech  Total Time (min):  13 min    Billable Minutes: Eval Swallow and Oral Function 13    06/20/2025

## 2025-06-20 NOTE — CONSULTS
"[facility]  Endocrinology  Diabetes Consult Note    Consult Requested by: Pete Garnett, *   Reason for admit: Sepsis    HISTORY OF PRESENT ILLNESS:  Reason for Consult: Management of T2DM, Hyperglycemia     Surgical Procedure and Date:  HM3 implantation 1/13/2021     Diabetes diagnosis year: 2021    Home Diabetes Medications:  None currently     Lab Results   Component Value Date    HGBA1C 6.6 (H) 05/19/2023       How often checking glucose at home? 1x per week   BG readings on regimen: 120s  Hypoglycemia on the regimen?  N/A  Missed doses on regimen?  N/A     Diabetes Complications include:     Hyperglycemia    Complicating diabetes co morbidities:   HTN, CHF      HPI:   Patient is a 69 y.o. male with a diagnosis of type 2 diabetes, hypertension,stage D CHF due to NICM underwent HM3 implantation 1/13/2021 with sternal closure 1/14/2021.  He was admitted for syncope 1/27/2022 at which time he was found to have an evolving right cerebral acute subdural hematoma and acute basal cistern subarachnoid hemorrhage. Patient presented to outside hospital emergency room with generalized weakness.  Patient is a poor historian.  He reports falling 3 times at home and has had weakness over the past week.  EMS called, noted sugar greater than 500.  Upon arrival, oral temperature is 101.1° F. patient complaining of dysuria, inability to "hold his urine".  Patient states he saw a doctor today, unable to give a urine sample, but had his INR drawn which was greater than 4.5. Endocrinology consulted for management of T2DM.     Interval HPI:   Overnight events: No acute events overnight. Patient in room 3098/3098 A. Blood glucose worsening. BG above goal on current insulin regimen (SSI ). Steroid use- None .    Renal function- Abnormal - Creatinine 1.7   Vasopressors-  None       Endocrine will continue to follow and manage insulin orders inpatient.         No diet orders on file     Eating:   NPO  Nausea: " No  Hypoglycemia and intervention: No  Fever: No  TPN and/or TF: No  If yes, type of TF/TPN and rate: n/a    PMH, PSH, FH, SH reviewed     ROS:  Constitutional: Positive for decreased appetite, fever, and fatigue.   Eyes: Negative for visual disturbance.  Respiratory: Negative for cough.   Cardiovascular: Negative for chest pain.  Gastrointestinal: Negative for nausea.  Endocrine: Negative for polyuria, polydipsia.  Musculoskeletal: Negative for back pain. Positive for falls.   Skin: Negative for rash.  Neurological: Negative for syncope.  Psychiatric/Behavioral: Negative for depression.      Review of Systems    Current Medications and/or Treatments Impacting Glycemic Control  Immunotherapy:    Immunosuppressants       None          Steroids:   Hormones (From admission, onward)      None          Pressors:    Autonomic Drugs (From admission, onward)      None          Hyperglycemia/Diabetes Medications:   Antihyperglycemics (From admission, onward)      Start     Stop Route Frequency Ordered    06/20/25 1600  insulin aspart U-100 pen 0-5 Units         -- SubQ 6 times per day 06/20/25 1337    06/20/25 0845  insulin glargine U-100 (Lantus) pen 12 Units         -- SubQ Daily 06/20/25 0839             PHYSICAL EXAMINATION:  Vitals:    06/20/25 1100   BP: (!) 76/0   Pulse: 82   Resp: 16   Temp: 98.4 °F (36.9 °C)     Body mass index is 21.03 kg/m².     Physical Exam   Constitutional: Well developed, well nourished, NAD.  ENT: External ears no masses with nose patent; normal hearing.  Neck: Supple; trachea midline.  Cardiovascular: Normal heart sounds  Lungs: Normal effort; lungs anterior bilaterally clear to auscultation.  Abdomen: Soft, no masses, no hernias.  MS: No clubbing or cyanosis of nails noted; unable to assess gait.  Skin: No rashes, lesions, or ulcers; no nodules.   Psychiatric: Good judgement and insight; normal mood and affect.  Neurological: Cranial nerves are grossly intact.  Foot: Nails in good  "condition, no amputations noted.      Labs Reviewed and Include   Recent Labs   Lab 06/20/25  0931 06/20/25  0959   *  --    CALCIUM 9.1  --    ALBUMIN  --  2.6*   PROT  --  9.5*   *  --    K 3.8  --    CO2 26  --    CL 90*  --    BUN 32*  --    CREATININE 1.7*  --    ALKPHOS  --  216*   ALT  --  19   AST  --  42   BILITOT  --  2.7*     Lab Results   Component Value Date    WBC 16.65 (H) 06/20/2025    HGB 11.2 (L) 06/20/2025    HCT 34.1 (L) 06/20/2025    MCV 88 06/20/2025     06/20/2025     No results for input(s): "TSH", "FREET4" in the last 168 hours.  Lab Results   Component Value Date    HGBA1C 12.4 (H) 06/20/2025       Nutritional status:   Body mass index is 21.03 kg/m².  Lab Results   Component Value Date    ALBUMIN 2.6 (L) 06/20/2025    ALBUMIN 2.6 (L) 06/19/2025    ALBUMIN 3.1 (L) 04/09/2025     Lab Results   Component Value Date    PREALBUMIN 5 (L) 06/20/2025    PREALBUMIN 7 (L) 01/09/2025    PREALBUMIN 7 (L) 10/10/2024       Estimated Creatinine Clearance: 43.1 mL/min (A) (based on SCr of 1.7 mg/dL (H)).    Accu-Checks  Recent Labs     06/19/25  1743 06/19/25  2004 06/19/25  2058 06/20/25  0113 06/20/25  0640 06/20/25  1051   POCTGLUCOSE 486* 495* 311* 289* 405* 351*        ASSESSMENT and PLAN    Cardiac/Vascular  LVAD (left ventricular assist device) present  Managed per primary team  Optimize BG control        ID  * Sepsis  Managed per primary team  Optimize BG control      Endocrine  Type 2 diabetes mellitus without complication  BG goal 140-180    Start Lantus 12 units once daily (0.3 u/kg dosing)  Start Novolog 4 units TID with meals (basal/prandial match)  Low Dose Correction Scale  BG monitoring ac/hs    ** Please call Endocrine for any BG related issues **      Lab Results   Component Value Date    HGBA1C 12.4 (H) 06/20/2025               Plan discussed with patient at bedside.     Marcia Montes NP  Endocrinology  [facility]  "

## 2025-06-20 NOTE — PLAN OF CARE
Problem: Occupational Therapy  Goal: Occupational Therapy Goal  Description: Goals to be met by: 7/5/25     Patient will increase functional independence with ADLs by performing:    LE Dressing with Modified [place].  Grooming while standing at sink with Modified [place].  Toileting from toilet with Modified [place] for hygiene and clothing management.   Supine to sit with Modified [place].  Toilet transfer to toilet with Modified [place].    Outcome: Progressing

## 2025-06-20 NOTE — PT/OT/SLP EVAL
Physical Therapy Co-Evaluation and Treatment    OT present for coeval due to pt's multiple medical comorbidities and functional/cognition deficits requiring two skilled therapists to appropriately progress pt's musculoskeletal strength, neuromuscular control, and endurance while taking into consideration medical acuity and pt safety.    Patient Name:  Rocco France   MRN:  [id number]    Recommendations:     Discharge Recommendations: Low Intensity Therapy   Discharge Equipment Recommendations: walker, rolling   Barriers to discharge: None    Assessment:     Rocco France is a 69 y.o. male admitted with a medical diagnosis of Type 2 diabetes mellitus.  He presents with the following impairments/functional limitations: weakness, impaired endurance, impaired functional mobility, gait instability, impaired balance, impaired self care skills, impaired cardiopulmonary response to activity     Pt receptive and tolerated PT co-eval with OT well. Pt amb ~ 20 ft in the room with CGA and no AD. Gait distance limited due to pt not having consolidation bag or vest present to switch to battery power for further amb. Patient currently demonstrates a need for low intensity therapy on a scheduled basis secondary to a decline in functional status due to disease.    Rehab Prognosis: Good; patient would benefit from acute skilled PT services to address these deficits and reach maximum level of function.    Recent Surgery: * No surgery found *      Plan:     During this hospitalization, patient to be seen 4 x/week to address the identified rehab impairments via gait training, therapeutic activities, therapeutic exercises, neuromuscular re-education and progress toward the following goals:    Plan of Care Expires:  07/20/25    Subjective     Chief Complaint: hiccups and not being able to eat  Patient/Family Comments/goals: Pt upset he hasn't been given anything to eat  Pain/Comfort:  Pain Rating 1: 0/10  Pain Rating Post-Intervention  1: 0/10    Patients cultural, spiritual, Jehovah's witness conflicts given the current situation: no    Patient History:     Living Environment: Pt lives with spouse in [facility] with 3 MARCIO. Bathroom: tub/shower combo   Prior Level of Function: Primarily IND, using SPC for community distance  DME owned: SPC, shower chair, SW  Caregiver Assistance: some assistance from spouse  Falls: 3 falls in the past week    Objective:     Communicated with RN prior to session.  Patient found HOB elevated with telemetry, PureWick, LVAD  upon PT entry to room.    General Precautions: Standard, LVAD, fall, aspiration  Orthopedic Precautions:N/A   Braces: N/A  Respiratory Status: Room air    Exams:  Gross Motor Coordination:  WFL  Sensation:    -       Intact  RLE ROM: WFL  RLE Strength: WFL  LLE ROM: WFL  LLE Strength: WFL    Functional Mobility:    Bed Mobility:   Rolling: to R with stand by assistance  Supine > Sit: stand by assistance  Sit > Supine: stand by assistance    Transfers:   Sit <> Stand Transfer: contact guard assistance from EOB without AD    Balance:   Sitting balance: FAIR+: Maintains balance through MINIMAL excursions of active trunk motion  Standing balance:   FAIR: Maintains without assist but unable to take challenges  FAIR: Needs CONTACT GUARD during gait                 Gait:  Distance: ~20 ft around the room   Assistive Device: no AD  Assistance Level: contact guard assistance  Gait Assessment: Pt amb with decreased yayo and slight shuffling gait pattern, no LOB.    Pt stay on wall power throughout session.      AM-PAC 6 CLICK MOBILITY  Total Score:19       Treatment & Education:  Pt educated on tip to reduce fall risk and safety with mobility and using call button for assistance from nursing staff with OOB mobility.  Pt educated on sitting up in chair 2-4 hours of the day  Pt educated on amb 2-3x per day with assistance from staff and increased movement during hospital stay.  All questions answered within the scope of  PT.  White board updated accordingly.    Patient left HOB elevated with all lines intact, call button in reach, and RN notified.    GOALS:   Multidisciplinary Problems       Physical Therapy Goals          Problem: Physical Therapy    Goal Priority Disciplines Outcome Interventions   Physical Therapy Goal     PT, PT/OT Progressing    Description: Goals to be met by: 25     Patient will increase functional independence with mobility by performin. Sit to stand transfer with Modified [place]  2. Bed to chair transfer with Modified [place] using LRAD or no AD  3. Gait  x 200 feet with Supervision using LRAD or no AD.   4. Ascend/descend 3 stair with no Handrails Contact Guard Assistance using LRAD or no AD.                          DME Justifications:   Rocco's mobility limitation cannot be sufficiently resolved by the use of a cane. His functional mobility deficit can be sufficiently resolved with the use of a Rolling Walker. Patient's mobility limitation significantly impairs their ability to participate in one of more activities of daily living.  The use of a RW will significantly improve the patient's ability to participate in MRADLS and the patient will use it on regular basis in the home.    History:     Past Medical History:   Diagnosis Date    Acute respiratory failure requiring reintubation 2022    CLARISSE (acute kidney injury) 2021    Diabetes mellitus     Kidney stones        Past Surgical History:   Procedure Laterality Date    CARDIAC CATHETERIZATION      no stents    CARDIAC DEFIBRILLATOR PLACEMENT  2010    CARDIAC DEFIBRILLATOR PLACEMENT      HERNIA REPAIR      IRRIGATION OF MEDIASTINUM N/A 2021    Procedure: IRRIGATION, MEDIASTINUM;  Surgeon: Zenon Corona MD;  Location: [facility] OR [address] [place] [address];  Service: Cardiovascular;  Laterality: N/A;    KNEE ARTHROSCOPY Right     LEFT VENTRICULAR ASSIST DEVICE Left 2021    Procedure: INSERTION- HeartMate 3 LEFT VENTRICULAR ASSIST  DEVICE ;  Surgeon: Zenon Corona MD;  [facility];  Service: Cardiovascular;  Laterality: Left;    RECONSTRUCTION OF PERICARDIUM N/A 1/14/2021    Procedure: RECONSTRUCTION, PERICARDIUM;  Surgeon: Zenon Corona MD;  [facility];  Service: Cardiovascular;  Laterality: N/A;    RIGHT HEART CATHETERIZATION Right 11/2/2020    Procedure: INSERTION, CATHETER, RIGHT HEART;  Surgeon: Deana Lake MD;  Location: [facility] CATH LAB;  Service: Cardiology;  Laterality: Right;    RIGHT HEART CATHETERIZATION Right 3/24/2022    Procedure: INSERTION, CATHETER, RIGHT HEART;  Surgeon: Manuel Ramos Jr., MD;  Location: [facility] CATH LAB;  Service: Cardiology;  Laterality: Right;    STERNAL WOUND CLOSURE  1/13/2021    Procedure: TEMPORARY CLOSURE OF CHEST;  Surgeon: Zenon Corona MD;  Location: [address];  Service: Cardiovascular;;    STERNAL WOUND CLOSURE N/A 1/14/2021    Procedure: CLOSURE, WOUND, STERNUM;  Surgeon: Zenon Corona MD;  [address];  Service: Cardiovascular;  Laterality: N/A;       Time Tracking:     PT Received On: 06/20/25  PT Start Time: 0932     PT Stop Time: 0952  PT Total Time (min): 20 min     Billable Minutes: Evaluation 10 and Gait Training 10      06/20/2025

## 2025-06-20 NOTE — SUBJECTIVE & OBJECTIVE
Interval HPI:   Overnight events: No acute events overnight. Patient in room 3098/3098 A. Blood glucose worsening. BG above goal on current insulin regimen (SSI ). Steroid use- None .    Renal function- Abnormal - Creatinine 1.7   Vasopressors-  None       Endocrine will continue to follow and manage insulin orders inpatient.         No diet orders on file     Eating:   NPO  Nausea: No  Hypoglycemia and intervention: No  Fever: No  TPN and/or TF: No  If yes, type of TF/TPN and rate: n/a    PMH, PSH, FH, SH reviewed     ROS:  Constitutional: Positive for decreased appetite, fever, and fatigue.   Eyes: Negative for visual disturbance.  Respiratory: Negative for cough.   Cardiovascular: Negative for chest pain.  Gastrointestinal: Negative for nausea.  Endocrine: Negative for polyuria, polydipsia.  Musculoskeletal: Negative for back pain. Positive for falls.   Skin: Negative for rash.  Neurological: Negative for syncope.  Psychiatric/Behavioral: Negative for depression.      Review of Systems    Current Medications and/or Treatments Impacting Glycemic Control  Immunotherapy:    Immunosuppressants       None          Steroids:   Hormones (From admission, onward)      None          Pressors:    Autonomic Drugs (From admission, onward)      None          Hyperglycemia/Diabetes Medications:   Antihyperglycemics (From admission, onward)      Start     Stop Route Frequency Ordered    06/20/25 1600  insulin aspart U-100 pen 0-5 Units         -- SubQ 6 times per day 06/20/25 1337    06/20/25 0845  insulin glargine U-100 (Lantus) pen 12 Units         -- SubQ Daily 06/20/25 0839             PHYSICAL EXAMINATION:  Vitals:    06/20/25 1100   BP: (!) 76/0   Pulse: 82   Resp: 16   Temp: 98.4 °F (36.9 °C)     Body mass index is 21.03 kg/m².     Physical Exam   Constitutional: Well developed, well nourished, NAD.  ENT: External ears no masses with nose patent; normal hearing.  Neck: Supple; trachea midline.  Cardiovascular: Normal  heart sounds  Lungs: Normal effort; lungs anterior bilaterally clear to auscultation.  Abdomen: Soft, no masses, no hernias.  MS: No clubbing or cyanosis of nails noted; unable to assess gait.  Skin: No rashes, lesions, or ulcers; no nodules.   Psychiatric: Good judgement and insight; normal mood and affect.  Neurological: Cranial nerves are grossly intact.  Foot: Nails in good condition, no amputations noted.

## 2025-06-20 NOTE — ASSESSMENT & PLAN NOTE
Pt initially presented with elevated lactic and elevated glucose, elevated serum osm but not quite to level of HHS. DKA workup negative. No pH for eval completed. Appears hypovolemic in AM. Suspect this is hypovolemic shock due to poor PO intake, though full history unclear. Pt reports this Am that he has not been taking in solids or liquids consistently.     -endocrine following, appreciate recs  -stop diuresis   -monitor renal function   -lactic normalized this AM after improvement in glucose levels

## 2025-06-20 NOTE — HPI
"Reason for Consult: Management of T2DM, Hyperglycemia     Surgical Procedure and Date:  s/p right toe amputation 06/22/2025    Diabetes diagnosis year: 2021    Home Diabetes Medications:  None currently     Lab Results   Component Value Date    HGBA1C 6.6 (H) 05/19/2023       How often checking glucose at home? 1x per week   BG readings on regimen: 120s  Hypoglycemia on the regimen?  N/A  Missed doses on regimen?  N/A     Diabetes Complications include:     Hyperglycemia    Complicating diabetes co morbidities:   HTN, CHF      HPI:   Patient is a 69 y.o. male with a diagnosis of type 2 diabetes, hypertension,stage D CHF due to NICM underwent HM3 implantation 1/13/2021 with sternal closure 1/14/2021.  He was admitted for syncope 1/27/2022 at which time he was found to have an evolving right cerebral acute subdural hematoma and acute basal cistern subarachnoid hemorrhage. Patient presented to outside hospital emergency room with generalized weakness.  Patient is a poor historian.  He reports falling 3 times at home and has had weakness over the past week.  EMS called, noted sugar greater than 500.  Upon arrival, oral temperature is 101.1° F. patient complaining of dysuria, inability to "hold his urine".  Patient states he saw a doctor today, unable to give a urine sample, but had his INR drawn which was greater than 4.5. Endocrinology consulted for management of T2DM.   "

## 2025-06-20 NOTE — PROGRESS NOTES
06/20/2025  Brenda Vo    Current provider:  Pete Garnett, *    Device interrogation:      6/20/2025    10:49 AM 6/20/2025     8:21 AM 6/20/2025     4:30 AM 6/20/2025    12:53 AM 4/9/2025     9:34 AM 1/9/2025     9:21 AM 10/10/2024     8:52 AM   TXP LVAD INTERROGATIONS   Type HeartMate3 HeartMate3 HeartMate3 HeartMate3      Flow 4.5 4.4 4.2 4.3      Speed 5200 5200 5200 5200      PI 5.5 4.8 5.9 4.3      Power (Edwards) 3.6 3.6 3.6 3.6      LSL 4800 4800        Pulsatility Intermittent pulse   Pulse Pulse Intermittent pulse No Pulse          Rounded on Rocco France to ensure all mechanical assist device settings (IABP or VAD) were appropriate and all parameters were within limits.  I was able to ensure all back up equipment was present, the staff had no issues, and the Perfusion Department daily rounding was complete.      For implantable VADs: Interrogation of Ventricular assist device was performed with analysis of device parameters and review of device function. I have personally reviewed the interrogation findings and agree with findings as stated.     In emergency, the nursing units have been notified to contact the perfusion department either by:  Calling [id number] from 630am to 4pm Mon thru Fri, utilizing the On-Call Finder functionality of Epic and searching for Perfusion, or by contacting the hospital  from 4pm to 630am and on weekends and asking to speak with the perfusionist on call.    12:54 PM

## 2025-06-20 NOTE — PLAN OF CARE
PT eval completed- see note for details, goals and POC established.     Problem: Physical Therapy  Goal: Physical Therapy Goal  Description: Goals to be met by: 25     Patient will increase functional independence with mobility by performin. Sit to stand transfer with Modified [place]  2. Bed to chair transfer with Modified [place] using LRAD or no AD  3. Gait  x 200 feet with Supervision using LRAD or no AD.   4. Ascend/descend 3 stair with no Handrails Contact Guard Assistance using LRAD or no AD.     Outcome: Progressing   2025

## 2025-06-20 NOTE — HPI
"68 yo M with  type 2 diabetes, hypertension,stage D CHF due to NICM underwent HM3 implantation 1/13/2021 with sternal closure 1/14/2021.  He was admitted for syncope 1/27/2022 at which time he was found to have an evolving right cerebral acute subdural hematoma and acute basal cistern subarachnoid hemorrhage.   Patient presented to outside hospital emergency room with generalized weakness.  Patient is a poor historian.  He reports falling 3 times at home and has had weakness over the past week.   EMS called, noted sugar greater than 500.  Upon arrival, oral temperature is 101.1° F. patient complaining of dysuria, inability to "hold his urine".  Patient states he saw a doctor today, unable to give a urine sample, but had his INR drawn which was greater than 4.5.    He denies chest pain or shortness of breath.  He does endorse a cough and hiccups.  Denies taking any medication for fever prior to arrival.  He denies nausea vomiting or diarrhea, no abdominal pain      Additionally patient denies PND, orthopnea, abdominal pain, abdominal pressure, chest pain, syncope or presyncope.    "

## 2025-06-20 NOTE — ASSESSMENT & PLAN NOTE
Pt presenting with hyperglycemia of nearly 600. Pt dry on exam on AM eval, overnight initiated on diuresis with IVP, discontinued this morning.   D/w Endocrine, given downtrending glucose levels, will initiate scheduled insulin.   -po intake per SLP recommendations   -endocrine following, appreciate recs.

## 2025-06-20 NOTE — ASSESSMENT & PLAN NOTE
Procedure: Device Interrogation Including analysis of device parameters  Current Settings: Ventricular Assist Device  Review of device function is stable/unstable stable        6/20/2025    10:49 AM 6/20/2025     8:21 AM 6/20/2025     4:30 AM 6/20/2025    12:53 AM 4/9/2025     9:34 AM 1/9/2025     9:21 AM 10/10/2024     8:52 AM   TXP LVAD INTERROGATIONS   Type HeartMate3 HeartMate3 HeartMate3 HeartMate3      Flow 4.5 4.4 4.2 4.3      Speed 5200 5200 5200 5200      PI 5.5 4.8 5.9 4.3      Power (Edwards) 3.6 3.6 3.6 3.6      LSL 4800 4800        Pulsatility Intermittent pulse   Pulse Pulse Intermittent pulse No Pulse

## 2025-06-20 NOTE — SUBJECTIVE & OBJECTIVE
Past Medical History:   Diagnosis Date    Acute respiratory failure requiring reintubation 1/28/2022    CLARISSE (acute kidney injury) 1/11/2021    Diabetes mellitus     Kidney stones        Past Surgical History:   Procedure Laterality Date    CARDIAC CATHETERIZATION  2010    no stents    CARDIAC DEFIBRILLATOR PLACEMENT  2010    CARDIAC DEFIBRILLATOR PLACEMENT      HERNIA REPAIR      IRRIGATION OF MEDIASTINUM N/A 1/14/2021    Procedure: IRRIGATION, MEDIASTINUM;  Surgeon: Zenon Corona MD;  [facility];  Service: Cardiovascular;  Laterality: N/A;    KNEE ARTHROSCOPY Right     LEFT VENTRICULAR ASSIST DEVICE Left 1/13/2021    Procedure: INSERTION- HeartMate 3 LEFT VENTRICULAR ASSIST DEVICE ;  Surgeon: Zenon Corona MD;  [facility];  Service: Cardiovascular;  Laterality: Left;    RECONSTRUCTION OF PERICARDIUM N/A 1/14/2021    Procedure: RECONSTRUCTION, PERICARDIUM;  Surgeon: Zenon Corona MD;  [facility];  Service: Cardiovascular;  Laterality: N/A;    RIGHT HEART CATHETERIZATION Right 11/2/2020    Procedure: INSERTION, CATHETER, RIGHT HEART;  Surgeon: Deana Lake MD;  Location: [facility] CATH LAB;  Service: Cardiology;  Laterality: Right;    RIGHT HEART CATHETERIZATION Right 3/24/2022    Procedure: INSERTION, CATHETER, RIGHT HEART;  Surgeon: Manuel Ramos Jr., MD;  Location: [facility] CATH LAB;  Service: Cardiology;  Laterality: Right;    STERNAL WOUND CLOSURE  1/13/2021    Procedure: TEMPORARY CLOSURE OF CHEST;  Surgeon: Zenon Corona MD;  [facility];  Service: Cardiovascular;;    STERNAL WOUND CLOSURE N/A 1/14/2021    Procedure: CLOSURE, WOUND, STERNUM;  Surgeon: Zenon Corona MD;  [facility];  Service: Cardiovascular;  Laterality: N/A;       Review of patient's allergies indicates:   Allergen Reactions    Pcn [penicillins] Hives       Current Facility-Administered Medications on File Prior to Encounter   Medication    [COMPLETED] acetaminophen  tablet 1,000 mg    [COMPLETED] cefTRIAXone injection 2 g    [COMPLETED] insulin regular injection 8 Units 0.08 mL     Current Outpatient Medications on File Prior to Encounter   Medication Sig    amLODIPine (NORVASC) 5 MG tablet Take 2 tablets (10 mg total) by mouth once daily.    atorvastatin (LIPITOR) 80 MG tablet Take 1 tablet (80 mg total) by mouth once daily.    digoxin (LANOXIN) 125 mcg tablet Take 1 tablet (0.125 mg total) by mouth once daily.    docusate sodium (COLACE) 100 MG capsule Take 100 mg by mouth Daily.    ferrous gluconate (FERGON) 324 MG tablet Take 1 tablet by mouth every morning.    magnesium oxide (MAG-OX) 400 mg (241.3 mg magnesium) tablet TAKE 1 TABLET BY MOUTH THREE TIMES DAILY    mirtazapine (REMERON) 30 MG tablet Take 1 tablet by mouth in the evening    pantoprazole (PROTONIX) 40 MG tablet Take 1 tablet (40 mg total) by mouth once daily. (Patient taking differently: Take 40 mg by mouth once daily. Take in Morning on empty stomach 20 min prior to other medications)    potassium chloride SA (K-DUR,KLOR-CON) 20 MEQ tablet Take 1 tablet (20 mEq total) by mouth 2 (two) times daily.    solifenacin (VESICARE) 5 MG tablet Take 5 mg by mouth once daily.    spironolactone (ALDACTONE) 25 MG tablet Take 1 tablet (25 mg total) by mouth once daily.    torsemide (DEMADEX) 20 MG Tab Take 2 tablets (40 mg total) by mouth 2 (two) times a day.    warfarin (COUMADIN) 5 MG tablet Take 0.5-1 tablets (2.5-5 mg total) by mouth Daily. As directed by the [facility]     Family History       Problem Relation (Age of Onset)    Heart attack Mother, Brother    Heart failure Brother    Hypertension Brother          Tobacco Use    Smoking status: Some Days     Types: Cigarettes    Smokeless tobacco: Never   Substance and Sexual Activity    Alcohol use: No    Drug use: Not on file    Sexual activity: Not on file     Review of Systems   Constitutional: Positive for decreased appetite, fever and malaise/fatigue.  Negative for chills, diaphoresis and weight gain.   HENT:  Negative for congestion and ear discharge.    Eyes:  Negative for blurred vision.   Cardiovascular:  Negative for chest pain, dyspnea on exertion, irregular heartbeat, leg swelling, orthopnea, palpitations and paroxysmal nocturnal dyspnea.   Respiratory:  Negative for cough, shortness of breath, snoring and sputum production.    Skin:  Negative for color change, dry skin and rash.   Musculoskeletal:  Positive for back pain and falls. Negative for joint pain and neck pain.   Gastrointestinal:  Negative for bloating, abdominal pain, constipation and diarrhea.   Genitourinary:  Positive for dysuria and flank pain. Negative for hematuria and hesitancy.   Neurological:  Negative for focal weakness, headaches, numbness and seizures.   Psychiatric/Behavioral:  Negative for altered mental status.      Objective:     Vital Signs (Most Recent):  Temp: 99 °F (37.2 °C) (06/20/25 0010)  Pulse: 93 (06/20/25 0047)  BP: (!) 82/0 (06/20/25 0012)  SpO2: 96 % (06/20/25 0010) Vital Signs (24h Range):  Temp:  [99 °F (37.2 °C)-101.1 °F (38.4 °C)] 99 °F (37.2 °C)  Pulse:  [64-98] 93  Resp:  [16-20] 20  SpO2:  [82 %-100 %] 96 %  BP: ()/(0-83) 82/0        There is no height or weight on file to calculate BMI.    SpO2: 96 %       No intake or output data in the 24 hours ending 06/20/25 0232    Lines/Drains/Airways       Line  Duration                  VAD 01/13/21 1211 Left ventricular assist device HeartMate 3 1618 days                     Physical Exam  Constitutional:       General: He is not in acute distress.     Appearance: He is ill-appearing.   HENT:      Nose: Nose normal.      Mouth/Throat:      Mouth: Mucous membranes are moist.   Eyes:      Pupils: Pupils are equal, round, and reactive to light.   Neck:      Comments: No JVD appreciated   Cardiovascular:      Rate and Rhythm: Normal rate and regular rhythm.      Pulses: Normal pulses.      Heart sounds: Murmur  "heard.   Pulmonary:      Effort: Pulmonary effort is normal. No respiratory distress.      Breath sounds: Wheezing present.   Abdominal:      General: Abdomen is flat. There is no distension.      Palpations: Abdomen is soft.      Tenderness: There is no right CVA tenderness or left CVA tenderness.   Musculoskeletal:         General: No swelling.      Cervical back: Normal range of motion and neck supple. No tenderness.      Right lower leg: No edema.      Left lower leg: No edema.   Skin:     General: Skin is warm.      Coloration: Skin is not pale.      Findings: No rash.   Neurological:      General: No focal deficit present.      Mental Status: He is alert.      Motor: No weakness.          Significant Labs: ABG:   Recent Labs   Lab 06/19/25  1802   PH 7.455*   PCO2 39.9   HCO3 27.2   POCSATURATED 64.7*   , Blood Culture: No results for input(s): "LABBLOO" in the last 48 hours., BMP:   Recent Labs   Lab 06/19/25  1751   *   *   K 4.4   CL 87*   CO2 25   BUN 32*   CREATININE 1.9*   CALCIUM 9.1   MG 1.7   , CMP   Recent Labs   Lab 06/19/25  1751   *   K 4.4   CL 87*   CO2 25   *   BUN 32*   CREATININE 1.9*   CALCIUM 9.1   PROT 9.6*   ALBUMIN 2.6*   BILITOT 2.6*   ALKPHOS 197*   AST 32   ALT 9*   ANIONGAP 10   , CBC   Recent Labs   Lab 06/19/25  1751   WBC 18.75*   HGB 10.5*   HCT 29.8*      , INR   Recent Labs   Lab 06/19/25  0000 06/19/25  1751   INR >4.5 1.7*   PROTIME  --  18.4*   , Lipid Panel No results for input(s): "CHOL", "HDL", "LDLCALC", "TRIG", "CHOLHDL" in the last 48 hours.,   Pathology Results  (Last 10 years)                 01/14/21 1413  Specimen to Pathology, Surgery Cardiovascular Final result    Narrative:  Pre-op Diagnosis: NICM (nonischemic cardiomyopathy) [I42.8]   Heart failure, unspecified HF chronicity, unspecified heart   failure type [I50.9]   Procedure(s):   CLOSURE, WOUND, STERNUM   IRRIGATION, MEDIASTINUM   RECONSTRUCTION, PERICARDIUM   Number of " "specimens: 1   Name of specimens:   1. Retained lap packing - gross   Release to patient->7 Day Delay   Specimen total (fresh, frozen, permanent):->1       01/13/21 1257  Specimen to Pathology, Surgery Cardiovascular Final result    Narrative:  Pre-op Diagnosis: NICM (nonischemic cardiomyopathy) [I42.8]   Heart failure, unspecified HF chronicity, unspecified heart   failure type [I50.9]   Post-op Diagnosis:   Procedure(s):   INSERTION-LEFT VENTRICULAR ASSIST DEVICE   Number of specimens: 1   Name of specimens:   1. LV apex rule out non-compaction, permanent   Specimen total (fresh, frozen, permanent):->1           , Troponin No results for input(s): "TROPONINIHS" in the last 48 hours., and All pertinent lab results from the last 24 hours have been reviewed.     "

## 2025-06-20 NOTE — ASSESSMENT & PLAN NOTE
Patient's FSGs are uncontrolled due to hyperglycemia on current medication regimen.  Last A1c reviewed-   Lab Results   Component Value Date    HGBA1C 6.6 (H) 05/19/2023     Most recent fingerstick glucose reviewed-   Recent Labs   Lab 06/19/25  1743 06/19/25 2004 06/19/25 2058 06/20/25  0113   POCTGLUCOSE 486* 495* 311* 289*      Hold Oral hypoglycemics while patient is in the hospital.

## 2025-06-20 NOTE — PROGRESS NOTES
06/20/25 0010 06/20/25 0012   Vital Signs   Temp 99 °F (37.2 °C)  --    Temp Source Oral  --    Pulse 78  --    Heart Rate Source Monitor  --    SpO2 96 %  --    Pulse Oximetry Type Intermittent  --    BP 92/63 (!) 82/0   BP Location Left arm Left arm   BP Method Automatic Doppler   Patient Position Lying Lying     Patient arrived to room 3098 via stretcher. Patient is AAOx4, respirations even and unlabored, no signs of distress noted. VAD emergency kit with patient. Back up controller, six batteries, and 2 clips with patient. No low flow alarms present. HTS on call notified of patients arrival. VAD coordinator Zaida notified. Blood glucose 289. Patient denies any chest pain, shortness of breath, or lightheadedness.

## 2025-06-20 NOTE — PROGRESS NOTES
Rounded on pt. Pt and wife AAAO. VAD Parameters WNL, no alarms except a no external power due to electrical outage.  6/18/25 pt PI was 10.3 for an hour, but no actual alarms noted.  No needs from me at this time.

## 2025-06-20 NOTE — ASSESSMENT & PLAN NOTE
Malnutrition Type:  Context: chronic illness  Level: moderate    Related to (etiology):   Physiological causes resulting in anorexia or diminished intake     Signs and Symptoms (as evidenced by):   Muscle and fat depletion      Malnutrition Characteristic Summary:  Subcutaneous Fat (Malnutrition): moderate depletion  Muscle Mass (Malnutrition): moderate depletion    Interventions/Recommendations (treatment strategy):  Collaboration with other providers  [facility]    Nutrition Diagnosis Status:   New

## 2025-06-20 NOTE — SUBJECTIVE & OBJECTIVE
Interval History: pt with improving mentation this AM. Oriented x 4. Pt appears dry on exam. Lasix discontinued. Discussed case with endocrine, appreciate recommendations. SLP eval today given difficulty with bedside swallow.     Per admitting team, suspected septic shock, though source of infection unclear. Repeat lactic in AM resolved. VBG ordered as well this AM. Pt with very high glucose  and bicarb of 25 which fitsj HHS picture, but serum osm that does not quite meet criteria for HHS and workup negative for DKA. Will hold diuresis and monitor response. Potential for hypovolemic shock based on initial eval in the AM. Given clinical status this AM, suspect shock picture has resolved. Will encourage PO intake this AM pending SLP eval and continue antibiotics for now.         Continuous Infusions:  Scheduled Meds:   amLODIPine  10 mg Oral Daily    atorvastatin  80 mg Oral Daily    ceFEPime IV (PEDS and ADULTS)  2 g Intravenous Q12H    insulin aspart U-100  4 Units Subcutaneous TIDWM    insulin glargine U-100  12 Units Subcutaneous Daily    mirtazapine  30 mg Oral QHS    warfarin  2.5 mg Oral Daily     PRN Meds:  Current Facility-Administered Medications:     chlorproMAZINE, 25 mg, Oral, TID PRN    dextrose 50%, 12.5 g, Intravenous, PRN    dextrose 50%, 25 g, Intravenous, PRN    glucagon (human recombinant), 1 mg, Intramuscular, PRN    glucose, 16 g, Oral, PRN    glucose, 24 g, Oral, PRN    insulin aspart U-100, 0-5 Units, Subcutaneous, QID (AC + HS) PRN    Pharmacy to dose Vancomycin consult, , , Once **AND** vancomycin - pharmacy to dose, , Intravenous, pharmacy to manage frequency    Review of patient's allergies indicates:   Allergen Reactions    Pcn [penicillins] Hives     Objective:     Vital Signs (Most Recent):  Temp: 98.4 °F (36.9 °C) (06/20/25 1100)  Pulse: 82 (06/20/25 1100)  Resp: 16 (06/20/25 1100)  BP: (!) 76/0 (06/20/25 1100)  SpO2: 99 % (06/20/25 1100) Vital Signs (24h Range):  Temp:  [97.6 °F (36.4  °C)-101.1 °F (38.4 °C)] 98.4 °F (36.9 °C)  Pulse:  [64-98] 82  Resp:  [16-20] 16  SpO2:  [82 %-100 %] 99 %  BP: ()/(0-83) 76/0     Patient Vitals for the past 72 hrs (Last 3 readings):   Weight   06/20/25 0315 74.3 kg (163 lb 12.8 oz)     Body mass index is 21.03 kg/m².      Intake/Output Summary (Last 24 hours) at 6/20/2025 1421  Last data filed at 6/20/2025 0957  Gross per 24 hour   Intake 120 ml   Output 1300 ml   Net -1180 ml       Hemodynamic Parameters:       Telemetry: reviewed       Physical Exam  Vitals reviewed.   Constitutional:       Appearance: He is normal weight.   HENT:      Head: Normocephalic and atraumatic.      Right Ear: External ear normal.      Left Ear: External ear normal.      Nose: Nose normal.      Mouth/Throat:      Mouth: Mucous membranes are moist.      Pharynx: Oropharynx is clear.   Eyes:      Conjunctiva/sclera: Conjunctivae normal.   Cardiovascular:      Rate and Rhythm: Normal rate and regular rhythm.      Pulses: Normal pulses.   Pulmonary:      Effort: Pulmonary effort is normal.      Breath sounds: Normal breath sounds.   Abdominal:      General: Abdomen is flat. Bowel sounds are normal.      Palpations: Abdomen is soft.   Musculoskeletal:      Cervical back: Normal range of motion.      Right lower leg: No edema.      Left lower leg: No edema.   Skin:     General: Skin is warm.      Capillary Refill: Capillary refill takes less than 2 seconds.   Neurological:      Mental Status: He is alert. Mental status is at baseline.   Psychiatric:         Mood and Affect: Mood normal.         Behavior: Behavior normal.            Significant Labs:  CBC:  Recent Labs   Lab 06/19/25 1751 06/20/25  0959   WBC 18.75* 16.65*   RBC 3.49* 3.88*   HGB 10.5* 11.2*   HCT 29.8* 34.1*    202   MCV 85 88   MCH 30.1 28.9   MCHC 35.2 32.8     BNP:  Recent Labs   Lab 06/19/25 1751 06/20/25  0959   BNP 3,655* 615*     CMP:  Recent Labs   Lab 06/19/25 1751 06/20/25  0931 06/20/25  0959    * 328*  --    CALCIUM 9.1 9.1  --    ALBUMIN 2.6*  --  2.6*   PROT 9.6*  --  9.5*   * 128*  --    K 4.4 3.8  --    CO2 25 26  --    CL 87* 90*  --    BUN 32* 32*  --    CREATININE 1.9* 1.7*  --    ALKPHOS 197*  --  216*   ALT 9*  --  19   AST 32  --  42   BILITOT 2.6*  --  2.7*      Coagulation:   Recent Labs   Lab 06/19/25  0000 06/19/25  1751 06/20/25  0959   INR >4.5 1.7* 1.8*   APTT  --  34.6* 38.6*     LDH:  Recent Labs   Lab 06/20/25  0959   *     Microbiology:  Microbiology Results (last 7 days)       ** No results found for the last 168 hours. **            I have reviewed all pertinent labs within the past 24 hours.    Estimated Creatinine Clearance: 43.1 mL/min (A) (based on SCr of 1.7 mg/dL (H)).    Diagnostic Results:  I have reviewed and interpreted all pertinent imaging results/findings within the past 24 hours.

## 2025-06-20 NOTE — H&P
"[facility]  Cardiology  History and Physical     Patient Name: Rocco France  MRN: [id number]  Admission Date: 6/20/2025  Code Status: Prior   Attending Provider: Pete Garnett, *   Primary Care Physician: Jay Guardado DO  Principal Problem:<principal problem not specified>    Patient information was obtained from patient and ER records.     Subjective:       HPI:  70 yo M with  type 2 diabetes, hypertension,stage D CHF due to NICM underwent HM3 implantation 1/13/2021 with sternal closure 1/14/2021.  He was admitted for syncope 1/27/2022 at which time he was found to have an evolving right cerebral acute subdural hematoma and acute basal cistern subarachnoid hemorrhage.   Patient presented to outside hospital emergency room with generalized weakness.  Patient is a poor historian.  He reports falling 3 times at home and has had weakness over the past week.   EMS called, noted sugar greater than 500.  Upon arrival, oral temperature is 101.1° F. patient complaining of dysuria, inability to "hold his urine".  Patient states he saw a doctor today, unable to give a urine sample, but had his INR drawn which was greater than 4.5.    He denies chest pain or shortness of breath.  He does endorse a cough and hiccups.  Denies taking any medication for fever prior to arrival.  He denies nausea vomiting or diarrhea, no abdominal pain      Additionally patient denies PND, orthopnea, abdominal pain, abdominal pressure, chest pain, syncope or presyncope.      Past Medical History:   Diagnosis Date    Acute respiratory failure requiring reintubation 1/28/2022    CLARISSE (acute kidney injury) 1/11/2021    Diabetes mellitus     Kidney stones        Past Surgical History:   Procedure Laterality Date    CARDIAC CATHETERIZATION  2010    no stents    CARDIAC DEFIBRILLATOR PLACEMENT  2010    CARDIAC DEFIBRILLATOR PLACEMENT      HERNIA REPAIR      IRRIGATION OF MEDIASTINUM N/A 1/14/2021    Procedure: IRRIGATION, " MEDIASTINUM;  Surgeon: Zenon Corona MD;  Location: [facility] OR [facility] FLR;  Service: Cardiovascular;  Laterality: N/A;    KNEE ARTHROSCOPY Right     LEFT VENTRICULAR ASSIST DEVICE Left 1/13/2021    Procedure: INSERTION- HeartMate 3 LEFT VENTRICULAR ASSIST DEVICE ;  Surgeon: Zenon Corona MD;  Location: [facility] OR [facility] FLR;  Service: Cardiovascular;  Laterality: Left;    RECONSTRUCTION OF PERICARDIUM N/A 1/14/2021    Procedure: RECONSTRUCTION, PERICARDIUM;  Surgeon: Zenon Corona MD;  Location: [facility]LR;  Service: Cardiovascular;  Laterality: N/A;    RIGHT HEART CATHETERIZATION Right 11/2/2020    Procedure: INSERTION, CATHETER, RIGHT HEART;  Surgeon: Deana Lake MD;  Location: [facility] CATH LAB;  Service: Cardiology;  Laterality: Right;    RIGHT HEART CATHETERIZATION Right 3/24/2022    Procedure: INSERTION, CATHETER, RIGHT HEART;  Surgeon: Manuel Ramos Jr., MD;  Location: [facility] CATH LAB;  Service: Cardiology;  Laterality: Right;    STERNAL WOUND CLOSURE  1/13/2021    Procedure: TEMPORARY CLOSURE OF CHEST;  Surgeon: Zenon Corona MD;  [facility];  Service: Cardiovascular;;    STERNAL WOUND CLOSURE N/A 1/14/2021    Procedure: CLOSURE, WOUND, STERNUM;  Surgeon: Zenon Corona MD;  [facility];  Service: Cardiovascular;  Laterality: N/A;       Review of patient's allergies indicates:   Allergen Reactions    Pcn [penicillins] Hives       Current Facility-Administered Medications on File Prior to Encounter   Medication    [COMPLETED] acetaminophen tablet 1,000 mg    [COMPLETED] cefTRIAXone injection 2 g    [COMPLETED] insulin regular injection 8 Units 0.08 mL     Current Outpatient Medications on File Prior to Encounter   Medication Sig    amLODIPine (NORVASC) 5 MG tablet Take 2 tablets (10 mg total) by mouth once daily.    atorvastatin (LIPITOR) 80 MG tablet Take 1 tablet (80 mg total) by mouth once daily.    digoxin (LANOXIN) 125 mcg tablet Take 1 tablet (0.125 mg total) by mouth once  daily.    docusate sodium (COLACE) 100 MG capsule Take 100 mg by mouth Daily.    ferrous gluconate (FERGON) 324 MG tablet Take 1 tablet by mouth every morning.    magnesium oxide (MAG-OX) 400 mg (241.3 mg magnesium) tablet TAKE 1 TABLET BY MOUTH THREE TIMES DAILY    mirtazapine (REMERON) 30 MG tablet Take 1 tablet by mouth in the evening    pantoprazole (PROTONIX) 40 MG tablet Take 1 tablet (40 mg total) by mouth once daily. (Patient taking differently: Take 40 mg by mouth once daily. Take in Morning on empty stomach 20 min prior to other medications)    potassium chloride SA (K-DUR,KLOR-CON) 20 MEQ tablet Take 1 tablet (20 mEq total) by mouth 2 (two) times daily.    solifenacin (VESICARE) 5 MG tablet Take 5 mg by mouth once daily.    spironolactone (ALDACTONE) 25 MG tablet Take 1 tablet (25 mg total) by mouth once daily.    torsemide (DEMADEX) 20 MG Tab Take 2 tablets (40 mg total) by mouth 2 (two) times a day.    warfarin (COUMADIN) 5 MG tablet Take 0.5-1 tablets (2.5-5 mg total) by mouth Daily. As directed by the [facility]     Family History       Problem Relation (Age of Onset)    Heart attack Mother, Brother    Heart failure Brother    Hypertension Brother          Tobacco Use    Smoking status: Some Days     Types: Cigarettes    Smokeless tobacco: Never   Substance and Sexual Activity    Alcohol use: No    Drug use: Not on file    Sexual activity: Not on file     Review of Systems   Constitutional: Positive for decreased appetite, fever and malaise/fatigue. Negative for chills, diaphoresis and weight gain.   HENT:  Negative for congestion and ear discharge.    Eyes:  Negative for blurred vision.   Cardiovascular:  Negative for chest pain, dyspnea on exertion, irregular heartbeat, leg swelling, orthopnea, palpitations and paroxysmal nocturnal dyspnea.   Respiratory:  Negative for cough, shortness of breath, snoring and sputum production.    Skin:  Negative for color change, dry skin and rash.    Musculoskeletal:  Positive for back pain and falls. Negative for joint pain and neck pain.   Gastrointestinal:  Negative for bloating, abdominal pain, constipation and diarrhea.   Genitourinary:  Positive for dysuria and flank pain. Negative for hematuria and hesitancy.   Neurological:  Negative for focal weakness, headaches, numbness and seizures.   Psychiatric/Behavioral:  Negative for altered mental status.      Objective:     Vital Signs (Most Recent):  Temp: 99 °F (37.2 °C) (06/20/25 0010)  Pulse: 93 (06/20/25 0047)  BP: (!) 82/0 (06/20/25 0012)  SpO2: 96 % (06/20/25 0010) Vital Signs (24h Range):  Temp:  [99 °F (37.2 °C)-101.1 °F (38.4 °C)] 99 °F (37.2 °C)  Pulse:  [64-98] 93  Resp:  [16-20] 20  SpO2:  [82 %-100 %] 96 %  BP: ()/(0-83) 82/0        There is no height or weight on file to calculate BMI.    SpO2: 96 %       No intake or output data in the 24 hours ending 06/20/25 0232    Lines/Drains/Airways       Line  Duration                  VAD 01/13/21 1211 Left ventricular assist device HeartMate 3 1618 days                     Physical Exam  Constitutional:       General: He is not in acute distress.     Appearance: He is ill-appearing.   HENT:      Nose: Nose normal.      Mouth/Throat:      Mouth: Mucous membranes are moist.   Eyes:      Pupils: Pupils are equal, round, and reactive to light.   Neck:      Comments: No JVD appreciated   Cardiovascular:      Rate and Rhythm: Normal rate and regular rhythm.      Pulses: Normal pulses.      Heart sounds: Murmur heard.   Pulmonary:      Effort: Pulmonary effort is normal. No respiratory distress.      Breath sounds: Wheezing present.   Abdominal:      General: Abdomen is flat. There is no distension.      Palpations: Abdomen is soft.      Tenderness: There is no right CVA tenderness or left CVA tenderness.   Musculoskeletal:         General: No swelling.      Cervical back: Normal range of motion and neck supple. No tenderness.      Right lower leg: No  "edema.      Left lower leg: No edema.   Skin:     General: Skin is warm.      Coloration: Skin is not pale.      Findings: No rash.   Neurological:      General: No focal deficit present.      Mental Status: He is alert.      Motor: No weakness.          Significant Labs: ABG:   Recent Labs   Lab 06/19/25  1802   PH 7.455*   PCO2 39.9   HCO3 27.2   POCSATURATED 64.7*   , Blood Culture: No results for input(s): "LABBLOO" in the last 48 hours., BMP:   Recent Labs   Lab 06/19/25  1751   *   *   K 4.4   CL 87*   CO2 25   BUN 32*   CREATININE 1.9*   CALCIUM 9.1   MG 1.7   , CMP   Recent Labs   Lab 06/19/25  1751   *   K 4.4   CL 87*   CO2 25   *   BUN 32*   CREATININE 1.9*   CALCIUM 9.1   PROT 9.6*   ALBUMIN 2.6*   BILITOT 2.6*   ALKPHOS 197*   AST 32   ALT 9*   ANIONGAP 10   , CBC   Recent Labs   Lab 06/19/25  1751   WBC 18.75*   HGB 10.5*   HCT 29.8*      , INR   Recent Labs   Lab 06/19/25  0000 06/19/25  1751   INR >4.5 1.7*   PROTIME  --  18.4*   , Lipid Panel No results for input(s): "CHOL", "HDL", "LDLCALC", "TRIG", "CHOLHDL" in the last 48 hours.,   Pathology Results  (Last 10 years)                 01/14/21 1413  Specimen to Pathology, Surgery Cardiovascular Final result    Narrative:  Pre-op Diagnosis: NICM (nonischemic cardiomyopathy) [I42.8]   Heart failure, unspecified HF chronicity, unspecified heart   failure type [I50.9]   Procedure(s):   CLOSURE, WOUND, STERNUM   IRRIGATION, MEDIASTINUM   RECONSTRUCTION, PERICARDIUM   Number of specimens: 1   Name of specimens:   1. Retained lap packing - gross   Release to patient->7 Day Delay   Specimen total (fresh, frozen, permanent):->1       01/13/21 1257  Specimen to Pathology, Surgery Cardiovascular Final result    Narrative:  Pre-op Diagnosis: NICM (nonischemic cardiomyopathy) [I42.8]   Heart failure, unspecified HF chronicity, unspecified heart   failure type [I50.9]   Post-op Diagnosis:   Procedure(s):   INSERTION-LEFT " "VENTRICULAR ASSIST DEVICE   Number of specimens: 1   Name of specimens:   1. LV apex rule out non-compaction, permanent   Specimen total (fresh, frozen, permanent):->1           , Troponin No results for input(s): "TROPONINIHS" in the last 48 hours., and All pertinent lab results from the last 24 hours have been reviewed.     Assessment and Plan:     Sepsis  This patient does have evidence of infective focus  My overall impression is sepsis with Acute kidney injury.  Source: Respiratory Infection, Urinary Tract Infection, and Skin and Soft Tissue Infection: Deep Wound Infection  Antibiotics given-   Antibiotics (72h ago, onward)      None          Latest lactate reviewed-  Recent Labs   Lab 06/19/25 2044   LACTATE 3.5*     Organ dysfunction indicated by Acute kidney injury and Acute heart failure    Fluid challenge Contraindicated- Fluid bolus is contraindicated in this patient due to End Stage Liver Disease and Congestive Heart Failure     Will Not start Pressors- Levophed for MAP of 65  Source control achieved by:   -- Starting broad-spectrum antibiotics  after both urine and blood cultures obtained  --  gentle diuresis given sepsis     repeat lactic acid pending    Anticoagulated   INR subtherapeutic continue warfarin    LVAD (left ventricular assist device) present  Procedure: Device Interrogation Including analysis of device parameters  Current Settings: Ventricular Assist Device  Review of device function is stable      6/20/2025    12:53 AM 4/9/2025     9:34 AM 1/9/2025     9:21 AM 10/10/2024     8:52 AM 6/12/2024     9:35 AM 3/7/2024     9:32 AM 12/7/2023    11:13 AM   TXP LVAD INTERROGATIONS   Type HeartMate3         Flow 4.3         Speed 5200         PI 4.3         Power (Edwards) 3.6         Pulsatility Pulse Pulse Intermittent pulse No Pulse No Pulse Pulse Pulse              Acute on chronic combined systolic and diastolic heart failure   BNP elevated   LVAD with no low-flow alarms     On exam patient " wall all hard to assess.  No JVD lower extremity edema appreciated.     Patient denies PND   Or orthopnea.    --   will trial IV dose of Lasix and   Closely monitor urine output.    Hyperlipidemia   Continue statin    Type 2 diabetes mellitus without complication  Patient's FSGs are uncontrolled due to hyperglycemia on current medication regimen.  Last A1c reviewed-   Lab Results   Component Value Date    HGBA1C 6.6 (H) 05/19/2023     Most recent fingerstick glucose reviewed-   Recent Labs   Lab 06/19/25  1743 06/19/25 2004 06/19/25 2058 06/20/25  0113   POCTGLUCOSE 486* 495* 311* 289*      Hold Oral hypoglycemics while patient is in the hospital.    Essential hypertension  Patient's blood pressure range in the last 24 hours was: BP  Min: 82/0  Max: 123/67.The patient's inpatient anti-hypertensive regimen is listed below:  Current Antihypertensives       Plan  - BP is controlled, no changes needed to their regimen          VTE Risk Mitigation (From admission, onward)      None            Molly Teresa MD  Cardiology   [facility]

## 2025-06-21 PROBLEM — B96.20 E COLI BACTEREMIA: Status: ACTIVE | Noted: 2025-06-21

## 2025-06-21 PROBLEM — R78.81 E COLI BACTEREMIA: Status: ACTIVE | Noted: 2025-06-21

## 2025-06-21 PROBLEM — L97.519 DIABETIC ULCER OF RIGHT FOOT: Status: ACTIVE | Noted: 2025-06-21

## 2025-06-21 PROBLEM — E11.621 DIABETIC ULCER OF RIGHT FOOT: Status: ACTIVE | Noted: 2025-06-21

## 2025-06-21 LAB
ABSOLUTE EOSINOPHIL (OHS): 0.35 K/UL
ABSOLUTE MONOCYTE (OHS): 2.07 K/UL (ref 0.3–1)
ABSOLUTE NEUTROPHIL COUNT (OHS): 9.92 K/UL (ref 1.8–7.7)
ANION GAP (OHS): 12 MMOL/L (ref 8–16)
ANION GAP (OHS): 12 MMOL/L (ref 8–16)
ANION GAP (OHS): 13 MMOL/L (ref 8–16)
ANION GAP (OHS): 13 MMOL/L (ref 8–16)
APTT PPP: 36.5 SECONDS (ref 21–32)
BACTERIA #/AREA URNS AUTO: ABNORMAL /HPF
BASOPHILS # BLD AUTO: 0.05 K/UL
BASOPHILS NFR BLD AUTO: 0.3 %
BILIRUB UR QL STRIP.AUTO: NEGATIVE
BUN SERPL-MCNC: 39 MG/DL (ref 8–23)
BUN SERPL-MCNC: 42 MG/DL (ref 8–23)
BUN SERPL-MCNC: 43 MG/DL (ref 8–23)
BUN SERPL-MCNC: 45 MG/DL (ref 8–23)
CALCIUM SERPL-MCNC: 8.6 MG/DL (ref 8.7–10.5)
CALCIUM SERPL-MCNC: 8.9 MG/DL (ref 8.7–10.5)
CALCIUM SERPL-MCNC: 9 MG/DL (ref 8.7–10.5)
CALCIUM SERPL-MCNC: 9.1 MG/DL (ref 8.7–10.5)
CHLORIDE SERPL-SCNC: 91 MMOL/L (ref 95–110)
CHLORIDE SERPL-SCNC: 93 MMOL/L (ref 95–110)
CHLORIDE SERPL-SCNC: 94 MMOL/L (ref 95–110)
CHLORIDE SERPL-SCNC: 96 MMOL/L (ref 95–110)
CLARITY UR: CLEAR
CO2 SERPL-SCNC: 23 MMOL/L (ref 23–29)
CO2 SERPL-SCNC: 24 MMOL/L (ref 23–29)
CO2 SERPL-SCNC: 24 MMOL/L (ref 23–29)
CO2 SERPL-SCNC: 27 MMOL/L (ref 23–29)
COLOR UR AUTO: YELLOW
CREAT SERPL-MCNC: 1.5 MG/DL (ref 0.5–1.4)
CREAT SERPL-MCNC: 1.6 MG/DL (ref 0.5–1.4)
CREAT SERPL-MCNC: 1.6 MG/DL (ref 0.5–1.4)
CREAT SERPL-MCNC: 1.7 MG/DL (ref 0.5–1.4)
ERYTHROCYTE [DISTWIDTH] IN BLOOD BY AUTOMATED COUNT: 13.9 % (ref 11.5–14.5)
GFR SERPLBLD CREATININE-BSD FMLA CKD-EPI: 43 ML/MIN/1.73/M2
GFR SERPLBLD CREATININE-BSD FMLA CKD-EPI: 46 ML/MIN/1.73/M2
GFR SERPLBLD CREATININE-BSD FMLA CKD-EPI: 46 ML/MIN/1.73/M2
GFR SERPLBLD CREATININE-BSD FMLA CKD-EPI: 50 ML/MIN/1.73/M2
GLUCOSE SERPL-MCNC: 170 MG/DL (ref 70–110)
GLUCOSE SERPL-MCNC: 314 MG/DL (ref 70–110)
GLUCOSE SERPL-MCNC: 340 MG/DL (ref 70–110)
GLUCOSE SERPL-MCNC: 360 MG/DL (ref 70–110)
GLUCOSE SERPL-MCNC: 376 MG/DL (ref 70–110)
GLUCOSE SERPL-MCNC: 397 MG/DL (ref 70–110)
GLUCOSE UR QL STRIP: ABNORMAL
HCT VFR BLD AUTO: 28.8 % (ref 40–54)
HGB BLD-MCNC: 9.6 GM/DL (ref 14–18)
HGB UR QL STRIP: ABNORMAL
HOLD SPECIMEN: NORMAL
HYALINE CASTS UR QL AUTO: 0 /LPF (ref 0–1)
IMM GRANULOCYTES # BLD AUTO: 0.28 K/UL (ref 0–0.04)
IMM GRANULOCYTES NFR BLD AUTO: 1.9 % (ref 0–0.5)
INR PPP: 1.6 (ref 0.8–1.2)
KETONES UR QL STRIP: NEGATIVE
LDH SERPL-CCNC: 217 U/L (ref 110–260)
LEUKOCYTE ESTERASE UR QL STRIP: NEGATIVE
LYMPHOCYTES # BLD AUTO: 1.75 K/UL (ref 1–4.8)
MAGNESIUM SERPL-MCNC: 1.9 MG/DL (ref 1.6–2.6)
MCH RBC QN AUTO: 28.8 PG (ref 27–31)
MCHC RBC AUTO-ENTMCNC: 33.3 G/DL (ref 32–36)
MCV RBC AUTO: 87 FL (ref 82–98)
MICROSCOPIC COMMENT: ABNORMAL
NITRITE UR QL STRIP: NEGATIVE
NUCLEATED RBC (/100WBC) (OHS): 0 /100 WBC
PH UR STRIP: 6 [PH]
PHOSPHATE SERPL-MCNC: 2 MG/DL (ref 2.7–4.5)
PLATELET # BLD AUTO: 183 K/UL (ref 150–450)
PMV BLD AUTO: 11.8 FL (ref 9.2–12.9)
POCT GLUCOSE: 236 MG/DL (ref 70–110)
POCT GLUCOSE: 360 MG/DL (ref 70–110)
POTASSIUM SERPL-SCNC: 3.7 MMOL/L (ref 3.5–5.1)
POTASSIUM SERPL-SCNC: 3.8 MMOL/L (ref 3.5–5.1)
POTASSIUM SERPL-SCNC: 4.3 MMOL/L (ref 3.5–5.1)
POTASSIUM SERPL-SCNC: 4.8 MMOL/L (ref 3.5–5.1)
PROT UR QL STRIP: ABNORMAL
PROTHROMBIN TIME: 17.3 SECONDS (ref 9–12.5)
RBC # BLD AUTO: 3.33 M/UL (ref 4.6–6.2)
RBC #/AREA URNS AUTO: 2 /HPF (ref 0–4)
RELATIVE EOSINOPHIL (OHS): 2.4 %
RELATIVE LYMPHOCYTE (OHS): 12.1 % (ref 18–48)
RELATIVE MONOCYTE (OHS): 14.4 % (ref 4–15)
RELATIVE NEUTROPHIL (OHS): 68.9 % (ref 38–73)
SODIUM SERPL-SCNC: 129 MMOL/L (ref 136–145)
SODIUM SERPL-SCNC: 130 MMOL/L (ref 136–145)
SODIUM SERPL-SCNC: 130 MMOL/L (ref 136–145)
SODIUM SERPL-SCNC: 133 MMOL/L (ref 136–145)
SP GR UR STRIP: 1.02
SQUAMOUS #/AREA URNS AUTO: <1 /HPF
UROBILINOGEN UR STRIP-ACNC: NEGATIVE EU/DL
VANCOMYCIN SERPL-MCNC: 11.2 UG/ML (ref ?–80)
WBC # BLD AUTO: 14.42 K/UL (ref 3.9–12.7)
WBC #/AREA URNS AUTO: 1 /HPF (ref 0–5)
YEAST UR QL AUTO: ABNORMAL /HPF

## 2025-06-21 PROCEDURE — 85025 COMPLETE CBC W/AUTO DIFF WBC: CPT | Performed by: STUDENT IN AN ORGANIZED HEALTH CARE EDUCATION/TRAINING PROGRAM

## 2025-06-21 PROCEDURE — 63600175 PHARM REV CODE 636 W HCPCS: Performed by: INTERNAL MEDICINE

## 2025-06-21 PROCEDURE — 85610 PROTHROMBIN TIME: CPT | Performed by: STUDENT IN AN ORGANIZED HEALTH CARE EDUCATION/TRAINING PROGRAM

## 2025-06-21 PROCEDURE — 93005 ELECTROCARDIOGRAM TRACING: CPT

## 2025-06-21 PROCEDURE — 20600001 HC STEP DOWN PRIVATE ROOM

## 2025-06-21 PROCEDURE — 25000003 PHARM REV CODE 250: Performed by: STUDENT IN AN ORGANIZED HEALTH CARE EDUCATION/TRAINING PROGRAM

## 2025-06-21 PROCEDURE — 80202 ASSAY OF VANCOMYCIN: CPT | Performed by: INTERNAL MEDICINE

## 2025-06-21 PROCEDURE — 27000248 HC VAD-ADDITIONAL DAY

## 2025-06-21 PROCEDURE — 85730 THROMBOPLASTIN TIME PARTIAL: CPT | Performed by: STUDENT IN AN ORGANIZED HEALTH CARE EDUCATION/TRAINING PROGRAM

## 2025-06-21 PROCEDURE — 83735 ASSAY OF MAGNESIUM: CPT | Performed by: STUDENT IN AN ORGANIZED HEALTH CARE EDUCATION/TRAINING PROGRAM

## 2025-06-21 PROCEDURE — 83615 LACTATE (LD) (LDH) ENZYME: CPT | Performed by: STUDENT IN AN ORGANIZED HEALTH CARE EDUCATION/TRAINING PROGRAM

## 2025-06-21 PROCEDURE — 10060 I&D ABSCESS SIMPLE/SINGLE: CPT | Mod: ,,, | Performed by: STUDENT IN AN ORGANIZED HEALTH CARE EDUCATION/TRAINING PROGRAM

## 2025-06-21 PROCEDURE — 63600175 PHARM REV CODE 636 W HCPCS: Performed by: STUDENT IN AN ORGANIZED HEALTH CARE EDUCATION/TRAINING PROGRAM

## 2025-06-21 PROCEDURE — 99223 1ST HOSP IP/OBS HIGH 75: CPT | Mod: 57,25,, | Performed by: STUDENT IN AN ORGANIZED HEALTH CARE EDUCATION/TRAINING PROGRAM

## 2025-06-21 PROCEDURE — 0J9Q3ZZ DRAINAGE OF RIGHT FOOT SUBCUTANEOUS TISSUE AND FASCIA, PERCUTANEOUS APPROACH: ICD-10-PCS | Performed by: STUDENT IN AN ORGANIZED HEALTH CARE EDUCATION/TRAINING PROGRAM

## 2025-06-21 PROCEDURE — 80048 BASIC METABOLIC PNL TOTAL CA: CPT

## 2025-06-21 PROCEDURE — 25000003 PHARM REV CODE 250

## 2025-06-21 PROCEDURE — 93010 ELECTROCARDIOGRAM REPORT: CPT | Mod: ,,, | Performed by: INTERNAL MEDICINE

## 2025-06-21 PROCEDURE — 99232 SBSQ HOSP IP/OBS MODERATE 35: CPT | Mod: ,,, | Performed by: NURSE PRACTITIONER

## 2025-06-21 PROCEDURE — 81003 URINALYSIS AUTO W/O SCOPE: CPT

## 2025-06-21 PROCEDURE — 63600175 PHARM REV CODE 636 W HCPCS: Performed by: NURSE PRACTITIONER

## 2025-06-21 PROCEDURE — 93750 INTERROGATION VAD IN PERSON: CPT | Mod: ,,, | Performed by: INTERNAL MEDICINE

## 2025-06-21 PROCEDURE — 84100 ASSAY OF PHOSPHORUS: CPT | Performed by: STUDENT IN AN ORGANIZED HEALTH CARE EDUCATION/TRAINING PROGRAM

## 2025-06-21 PROCEDURE — 36415 COLL VENOUS BLD VENIPUNCTURE: CPT

## 2025-06-21 PROCEDURE — 99223 1ST HOSP IP/OBS HIGH 75: CPT | Mod: ,,, | Performed by: INTERNAL MEDICINE

## 2025-06-21 PROCEDURE — 36415 COLL VENOUS BLD VENIPUNCTURE: CPT | Performed by: STUDENT IN AN ORGANIZED HEALTH CARE EDUCATION/TRAINING PROGRAM

## 2025-06-21 RX ORDER — MUPIROCIN 20 MG/G
OINTMENT TOPICAL 2 TIMES DAILY
Status: DISPENSED | OUTPATIENT
Start: 2025-06-21 | End: 2025-06-26

## 2025-06-21 RX ORDER — INSULIN ASPART 100 [IU]/ML
6 INJECTION, SOLUTION INTRAVENOUS; SUBCUTANEOUS
Status: DISCONTINUED | OUTPATIENT
Start: 2025-06-21 | End: 2025-06-22

## 2025-06-21 RX ORDER — INSULIN GLARGINE 100 [IU]/ML
8 INJECTION, SOLUTION SUBCUTANEOUS ONCE
Status: COMPLETED | OUTPATIENT
Start: 2025-06-21 | End: 2025-06-21

## 2025-06-21 RX ORDER — INSULIN GLARGINE 100 [IU]/ML
20 INJECTION, SOLUTION SUBCUTANEOUS DAILY
Status: DISCONTINUED | OUTPATIENT
Start: 2025-06-22 | End: 2025-06-22

## 2025-06-21 RX ORDER — POTASSIUM CHLORIDE 750 MG/1
30 CAPSULE, EXTENDED RELEASE ORAL ONCE
Status: COMPLETED | OUTPATIENT
Start: 2025-06-21 | End: 2025-06-21

## 2025-06-21 RX ORDER — CEFTRIAXONE 2 G/1
2 INJECTION, POWDER, FOR SOLUTION INTRAMUSCULAR; INTRAVENOUS
Status: DISCONTINUED | OUTPATIENT
Start: 2025-06-21 | End: 2025-06-24

## 2025-06-21 RX ADMIN — INSULIN ASPART 4 UNITS: 100 INJECTION, SOLUTION INTRAVENOUS; SUBCUTANEOUS at 08:06

## 2025-06-21 RX ADMIN — ATORVASTATIN CALCIUM 80 MG: 40 TABLET, FILM COATED ORAL at 08:06

## 2025-06-21 RX ADMIN — MIRTAZAPINE 30 MG: 30 TABLET, FILM COATED ORAL at 08:06

## 2025-06-21 RX ADMIN — INSULIN ASPART 2 UNITS: 100 INJECTION, SOLUTION INTRAVENOUS; SUBCUTANEOUS at 04:06

## 2025-06-21 RX ADMIN — POTASSIUM CHLORIDE 30 MEQ: 750 CAPSULE, EXTENDED RELEASE ORAL at 08:06

## 2025-06-21 RX ADMIN — CEFEPIME 2 G: 2 INJECTION, POWDER, FOR SOLUTION INTRAVENOUS at 05:06

## 2025-06-21 RX ADMIN — CHLORPROMAZINE HYDROCHLORIDE 25 MG: 25 TABLET, FILM COATED ORAL at 08:06

## 2025-06-21 RX ADMIN — INSULIN ASPART 5 UNITS: 100 INJECTION, SOLUTION INTRAVENOUS; SUBCUTANEOUS at 08:06

## 2025-06-21 RX ADMIN — INSULIN GLARGINE 8 UNITS: 100 INJECTION, SOLUTION SUBCUTANEOUS at 10:06

## 2025-06-21 RX ADMIN — INSULIN ASPART 6 UNITS: 100 INJECTION, SOLUTION INTRAVENOUS; SUBCUTANEOUS at 04:06

## 2025-06-21 RX ADMIN — INSULIN ASPART 5 UNITS: 100 INJECTION, SOLUTION INTRAVENOUS; SUBCUTANEOUS at 12:06

## 2025-06-21 RX ADMIN — INSULIN ASPART 6 UNITS: 100 INJECTION, SOLUTION INTRAVENOUS; SUBCUTANEOUS at 12:06

## 2025-06-21 RX ADMIN — AMLODIPINE BESYLATE 10 MG: 10 TABLET ORAL at 08:06

## 2025-06-21 RX ADMIN — CEFTRIAXONE 2 G: 2 INJECTION, POWDER, FOR SOLUTION INTRAMUSCULAR; INTRAVENOUS at 04:06

## 2025-06-21 RX ADMIN — INSULIN GLARGINE 12 UNITS: 100 INJECTION, SOLUTION SUBCUTANEOUS at 08:06

## 2025-06-21 RX ADMIN — WARFARIN SODIUM 2.5 MG: 2.5 TABLET ORAL at 04:06

## 2025-06-21 NOTE — PROGRESS NOTES
06/21/2025  Vanessa Ramires    Current provider:  Pete Garnett, *    Device interrogation:      6/21/2025     8:12 AM 6/21/2025     1:05 AM 6/20/2025     7:45 PM 6/20/2025     4:25 PM 6/20/2025    10:49 AM 6/20/2025     8:21 AM 6/20/2025     4:30 AM   TXP LVAD INTERROGATIONS   Type HeartMate3 HeartMate3 HeartMate3 HeartMate3 HeartMate3 HeartMate3 HeartMate3   Flow 4.6 4.6 4.6 4.7 4.5 4.4 4.2   Speed 5200 5250 5200 5200 5200 5200 5200   PI 5.8 5 4.8 4.9 5.5 4.8 5.9   Power (Edwards) 2.7 3.6 3.6 3.5 3.6 3.6 3.6   LSL 4800 4800 4800 4800 4800 4800    Pulsatility Intermittent pulse Intermittent pulse Intermittent pulse Intermittent pulse Intermittent pulse            Rounded on Rocco France to ensure all mechanical assist device settings (IABP or VAD) were appropriate and all parameters were within limits.  I was able to ensure all back up equipment was present, the staff had no issues, and the Perfusion Department daily rounding was complete.      For implantable VADs: Interrogation of Ventricular assist device was performed with analysis of device parameters and review of device function. I have personally reviewed the interrogation findings and agree with findings as stated.     In emergency, the nursing units have been notified to contact the perfusion department either by:  Calling [phone] from 630am to 4pm Mon thru Fri, utilizing the On-Call Finder functionality of Epic and searching for Perfusion, or by contacting the hospital  from 4pm to 630am and on weekends and asking to speak with the perfusionist on call.    11:42 AM

## 2025-06-21 NOTE — ASSESSMENT & PLAN NOTE
Procedure: Device Interrogation Including analysis of device parameters  Current Settings: Ventricular Assist Device  Review of device function is stable/unstable stable        6/21/2025     8:12 AM 6/21/2025     1:05 AM 6/20/2025     7:45 PM 6/20/2025     4:25 PM 6/20/2025    10:49 AM 6/20/2025     8:21 AM 6/20/2025     4:30 AM   TXP LVAD INTERROGATIONS   Type HeartMate3 HeartMate3 HeartMate3 HeartMate3 HeartMate3 HeartMate3 HeartMate3   Flow 4.6 4.6 4.6 4.7 4.5 4.4 4.2   Speed 5200 5250 5200 5200 5200 5200 5200   PI 5.8 5 4.8 4.9 5.5 4.8 5.9   Power (Edwards) 2.7 3.6 3.6 3.5 3.6 3.6 3.6   LSL 4800 4800 4800 4800 4800 4800    Pulsatility Intermittent pulse Intermittent pulse Intermittent pulse Intermittent pulse Intermittent pulse

## 2025-06-21 NOTE — ASSESSMENT & PLAN NOTE
Pt initially presented with elevated lactic and elevated glucose, elevated serum osm but not quite to level of HHS. DKA workup negative. No pH for eval completed. Appears hypovolemic in AM. Suspect this is hypovolemic shock due to poor PO intake, though full history unclear. Pt reports this Am that he has not been taking in solids or liquids consistently.     -endocrine following, appreciate recs  -stop diuresis, cont to hold   -monitor renal function   -lactic normalized this AM after improvement in glucose levels

## 2025-06-21 NOTE — SUBJECTIVE & OBJECTIVE
Interval History: pt with continued improved/stable mentation this AM. Oriented x 4. GNR in blodo cultures supporting initial presentation with septic shock. CT T/A/P pending. Pt appears dry on exam. Lasix discontinued/will cotn to hold.      Discussed case with endocrine, appreciate recommendations. SLP eval today given difficulty with bedside swallow- diabetic diet ordered.         Continuous Infusions:  Scheduled Meds:   amLODIPine  10 mg Oral Daily    atorvastatin  80 mg Oral Daily    ceFEPime IV (PEDS and ADULTS)  2 g Intravenous Q12H    insulin aspart U-100  6 Units Subcutaneous TIDWM    [START ON 6/22/2025] insulin glargine U-100  20 Units Subcutaneous Daily    mirtazapine  30 mg Oral QHS    warfarin  2.5 mg Oral Daily     PRN Meds:  Current Facility-Administered Medications:     chlorproMAZINE, 25 mg, Oral, TID PRN    dextrose 50%, 12.5 g, Intravenous, PRN    dextrose 50%, 25 g, Intravenous, PRN    glucagon (human recombinant), 1 mg, Intramuscular, PRN    glucose, 16 g, Oral, PRN    glucose, 24 g, Oral, PRN    insulin aspart U-100, 0-5 Units, Subcutaneous, QID (AC + HS) PRN    Review of patient's allergies indicates:   Allergen Reactions    Pcn [penicillins] Hives     Objective:     Vital Signs (Most Recent):  Temp: 97.6 °F (36.4 °C) (06/21/25 0730)  Pulse: 97 (06/21/25 0730)  Resp: 16 (06/21/25 0730)  BP: (!) 90/0 (06/21/25 0730)  SpO2: 98 % (06/21/25 0730) Vital Signs (24h Range):  Temp:  [97.6 °F (36.4 °C)-98.7 °F (37.1 °C)] 97.6 °F (36.4 °C)  Pulse:  [79-97] 97  Resp:  [16-18] 16  SpO2:  [98 %-99 %] 98 %  BP: (76-90)/(0) 90/0     Patient Vitals for the past 72 hrs (Last 3 readings):   Weight   06/21/25 0530 72.2 kg (159 lb 2.8 oz)   06/20/25 1510 74.3 kg (163 lb 12.8 oz)   06/20/25 0315 74.3 kg (163 lb 12.8 oz)     Body mass index is 20.44 kg/m².      Intake/Output Summary (Last 24 hours) at 6/21/2025 1055  Last data filed at 6/21/2025 0836  Gross per 24 hour   Intake 938 ml   Output 1000 ml   Net -62 ml        Hemodynamic Parameters:       Telemetry: reviewed       Physical Exam  Vitals reviewed.   Constitutional:       Appearance: He is normal weight.   HENT:      Head: Normocephalic and atraumatic.      Right Ear: External ear normal.      Left Ear: External ear normal.      Nose: Nose normal.      Mouth/Throat:      Mouth: Mucous membranes are moist.      Pharynx: Oropharynx is clear.   Eyes:      Conjunctiva/sclera: Conjunctivae normal.   Cardiovascular:      Rate and Rhythm: Normal rate and regular rhythm.      Pulses: Normal pulses.   Pulmonary:      Effort: Pulmonary effort is normal.      Breath sounds: Normal breath sounds.   Abdominal:      General: Abdomen is flat. Bowel sounds are normal.      Palpations: Abdomen is soft.   Musculoskeletal:      Cervical back: Normal range of motion.      Right lower leg: No edema.      Left lower leg: No edema.   Skin:     General: Skin is warm.      Capillary Refill: Capillary refill takes less than 2 seconds.   Neurological:      Mental Status: He is alert. Mental status is at baseline.   Psychiatric:         Mood and Affect: Mood normal.         Behavior: Behavior normal.            Significant Labs:  CBC:  Recent Labs   Lab 06/19/25  1751 06/20/25  0959 06/21/25  0529   WBC 18.75* 16.65* 14.42*   RBC 3.49* 3.88* 3.33*   HGB 10.5* 11.2* 9.6*   HCT 29.8* 34.1* 28.8*    202 183   MCV 85 88 87   MCH 30.1 28.9 28.8   MCHC 35.2 32.8 33.3     BNP:  Recent Labs   Lab 06/19/25  1751 06/20/25  0959   BNP 3,655* 615*     CMP:  Recent Labs   Lab 06/19/25  1751 06/20/25  0931 06/20/25  0959 06/20/25  1404 06/20/25  2013 06/21/25  0529   *   < >  --  384* 325* 340*   CALCIUM 9.1   < >  --  9.1 8.6* 8.6*   ALBUMIN 2.6*  --  2.6*  --   --   --    PROT 9.6*  --  9.5*  --   --   --    *   < >  --  127* 128* 129*   K 4.4   < >  --  3.9 3.9 3.7   CO2 25   < >  --  26 26 24   CL 87*   < >  --  90* 91* 93*   BUN 32*   < >  --  33* 42* 42*   CREATININE 1.9*   < >  --   1.7* 1.7* 1.6*   ALKPHOS 197*  --  216*  --   --   --    ALT 9*  --  19  --   --   --    AST 32  --  42  --   --   --    BILITOT 2.6*  --  2.7*  --   --   --     < > = values in this interval not displayed.      Coagulation:   Recent Labs   Lab 06/19/25  1751 06/20/25  0959 06/21/25  0529   INR 1.7* 1.8* 1.6*   APTT 34.6* 38.6* 36.5*     LDH:  Recent Labs   Lab 06/20/25  0959 06/21/25  0529   * 217     Microbiology:  Microbiology Results (last 7 days)       Procedure Component Value Units Date/Time    Culture, Anaerobe [[id number]]     Order Status: Sent Specimen: Skin     Aerobic culture [[id number]]     Order Status: Sent Specimen: Skin             I have reviewed all pertinent labs within the past 24 hours.    Estimated Creatinine Clearance: 44.5 mL/min (A) (based on SCr of 1.6 mg/dL (H)).    Diagnostic Results:  I have reviewed and interpreted all pertinent imaging results/findings within the past 24 hours.

## 2025-06-21 NOTE — SUBJECTIVE & OBJECTIVE
"Interval HPI:   Overnight events: No acute events overnight. Patient in room 3098/3098 A. Blood glucose worsening. BG above goal on current insulin regimen (SSI, prandial, and basal insulin ). Steroid use- None .    Renal function- Abnormal - Creatinine 1.6   Vasopressors-  None       Endocrine will continue to follow and manage insulin orders inpatient.         Diet Consistent Carbohydrate 2000 Calories (up to 75 gm per meal)     Eatin%  Nausea: No  Hypoglycemia and intervention: No  Fever: No  TPN and/or TF: No  If yes, type of TF/TPN and rate: n/a    BP (!) 90/0 (BP Location: Left arm, Patient Position: Lying)   Pulse 97   Temp 97.6 °F (36.4 °C) (Temporal)   Resp 16   Ht 6' 2" (1.88 m)   Wt 72.2 kg (159 lb 2.8 oz)   SpO2 98%   BMI 20.44 kg/m²     Labs Reviewed and Include    Recent Labs   Lab 25  0959 25  1404 25  0529   GLU  --    < > 340*   CALCIUM  --    < > 8.6*   ALBUMIN 2.6*  --   --    PROT 9.5*  --   --    NA  --    < > 129*   K  --    < > 3.7   CO2  --    < > 24   CL  --    < > 93*   BUN  --    < > 42*   CREATININE  --    < > 1.6*   ALKPHOS 216*  --   --    ALT 19  --   --    AST 42  --   --    BILITOT 2.7*  --   --     < > = values in this interval not displayed.     Lab Results   Component Value Date    WBC 14.42 (H) 2025    HGB 9.6 (L) 2025    HCT 28.8 (L) 2025    MCV 87 2025     2025     No results for input(s): "TSH", "FREET4" in the last 168 hours.  Lab Results   Component Value Date    HGBA1C 12.4 (H) 2025       Nutritional status:   Body mass index is 20.44 kg/m².  Lab Results   Component Value Date    ALBUMIN 2.6 (L) 2025    ALBUMIN 2.6 (L) 2025    ALBUMIN 3.1 (L) 2025     Lab Results   Component Value Date    PREALBUMIN 5 (L) 2025    PREALBUMIN 7 (L) 2025    PREALBUMIN 7 (L) 10/10/2024       Estimated Creatinine Clearance: 44.5 mL/min (A) (based on SCr of 1.6 mg/dL " (H)).    Accu-Checks  Recent Labs     06/19/25  1743 06/19/25  2004 06/19/25  2058 06/20/25  0113 06/20/25  0640 06/20/25  0837 06/20/25  1051 06/20/25  1606 06/20/25  2131   POCTGLUCOSE 486* 495* 311* 289* 405* 336* 351* 431* 358*       Current Medications and/or Treatments Impacting Glycemic Control  Immunotherapy:    Immunosuppressants       None          Steroids:   Hormones (From admission, onward)      None          Pressors:    Autonomic Drugs (From admission, onward)      None          Hyperglycemia/Diabetes Medications:   Antihyperglycemics (From admission, onward)      Start     Stop Route Frequency Ordered    06/22/25 0900  insulin glargine U-100 (Lantus) pen 20 Units         -- SubQ Daily 06/21/25 0908    06/21/25 1130  insulin aspart U-100 pen 6 Units         -- SubQ 3 times daily with meals 06/21/25 0909    06/21/25 0908  insulin glargine U-100 (Lantus) pen 8 Units         -- SubQ Once 06/21/25 0908    06/20/25 1449  insulin aspart U-100 pen 0-5 Units         -- SubQ Before meals & nightly PRN 06/20/25 1349

## 2025-06-21 NOTE — PROGRESS NOTES
"[facility] Step[facility]  Endocrinology  Progress Note    Admit Date: 2025     Reason for Consult: Management of T2DM, Hyperglycemia     Surgical Procedure and Date:  HM3 implantation 2021     Diabetes diagnosis year:     Home Diabetes Medications:  None currently     Lab Results   Component Value Date    HGBA1C 6.6 (H) 2023       How often checking glucose at home? 1x per week   BG readings on regimen: 120s  Hypoglycemia on the regimen?  N/A  Missed doses on regimen?  N/A     Diabetes Complications include:     Hyperglycemia    Complicating diabetes co morbidities:   HTN, CHF      HPI:   Patient is a 69 y.o. 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  He was admitted for syncope 2022 at which time he was found to have an evolving right cerebral acute subdural hematoma and acute basal cistern subarachnoid hemorrhage. Patient presented to outside hospital emergency room with generalized weakness.  Patient is a poor historian.  He reports falling 3 times at home and has had weakness over the past week.  EMS called, noted sugar greater than 500.  Upon arrival, oral temperature is 101.1° F. patient complaining of dysuria, inability to "hold his urine".  Patient states he saw a doctor today, unable to give a urine sample, but had his INR drawn which was greater than 4.5. Endocrinology consulted for management of T2DM.     Interval HPI:   Overnight events: No acute events overnight. Patient in room 3098/3098 A. Blood glucose worsening. BG above goal on current insulin regimen (SSI, prandial, and basal insulin ). Steroid use- None .    Renal function- Abnormal - Creatinine 1.6   Vasopressors-  None       Endocrine will continue to follow and manage insulin orders inpatient.         Diet Consistent Carbohydrate 2000 Calories (up to 75 gm per meal)     Eatin%  Nausea: " "No  Hypoglycemia and intervention: No  Fever: No  TPN and/or TF: No  If yes, type of TF/TPN and rate: n/a    BP (!) 90/0 (BP Location: Left arm, Patient Position: Lying)   Pulse 97   Temp 97.6 °F (36.4 °C) (Temporal)   Resp 16   Ht 6' 2" (1.88 m)   Wt 72.2 kg (159 lb 2.8 oz)   SpO2 98%   BMI 20.44 kg/m²     Labs Reviewed and Include    Recent Labs   Lab 06/20/25  0959 06/20/25  1404 06/21/25  0529   GLU  --    < > 340*   CALCIUM  --    < > 8.6*   ALBUMIN 2.6*  --   --    PROT 9.5*  --   --    NA  --    < > 129*   K  --    < > 3.7   CO2  --    < > 24   CL  --    < > 93*   BUN  --    < > 42*   CREATININE  --    < > 1.6*   ALKPHOS 216*  --   --    ALT 19  --   --    AST 42  --   --    BILITOT 2.7*  --   --     < > = values in this interval not displayed.     Lab Results   Component Value Date    WBC 14.42 (H) 06/21/2025    HGB 9.6 (L) 06/21/2025    HCT 28.8 (L) 06/21/2025    MCV 87 06/21/2025     06/21/2025     No results for input(s): "TSH", "FREET4" in the last 168 hours.  Lab Results   Component Value Date    HGBA1C 12.4 (H) 06/20/2025       Nutritional status:   Body mass index is 20.44 kg/m².  Lab Results   Component Value Date    ALBUMIN 2.6 (L) 06/20/2025    ALBUMIN 2.6 (L) 06/19/2025    ALBUMIN 3.1 (L) 04/09/2025     Lab Results   Component Value Date    PREALBUMIN 5 (L) 06/20/2025    PREALBUMIN 7 (L) 01/09/2025    PREALBUMIN 7 (L) 10/10/2024       Estimated Creatinine Clearance: 44.5 mL/min (A) (based on SCr of 1.6 mg/dL (H)).    Accu-Checks  Recent Labs     06/19/25  1743 06/19/25  2004 06/19/25  2058 06/20/25  0113 06/20/25  0640 06/20/25  0837 06/20/25  1051 06/20/25  1606 06/20/25  2131   POCTGLUCOSE 486* 495* 311* 289* 405* 336* 351* 431* 358*       Current Medications and/or Treatments Impacting Glycemic Control  Immunotherapy:    Immunosuppressants       None          Steroids:   Hormones (From admission, onward)      None          Pressors:    Autonomic Drugs (From admission, onward)      " None          Hyperglycemia/Diabetes Medications:   Antihyperglycemics (From admission, onward)      Start     Stop Route Frequency Ordered    06/22/25 0900  insulin glargine U-100 (Lantus) pen 20 Units         -- SubQ Daily 06/21/25 0908    06/21/25 1130  insulin aspart U-100 pen 6 Units         -- SubQ 3 times daily with meals 06/21/25 0909    06/21/25 0908  insulin glargine U-100 (Lantus) pen 8 Units         -- SubQ Once 06/21/25 0908    06/20/25 1449  insulin aspart U-100 pen 0-5 Units         -- SubQ Before meals & nightly PRN 06/20/25 1349            ASSESSMENT and PLAN    Cardiac/Vascular  LVAD (left ventricular assist device) present  Managed per primary team  Optimize BG control        Endocrine  * Type 2 diabetes mellitus  BG goal 140-180  BG remains significantly above goal. Recommend increasing to 0.5 u/kg dosing.    Plan:  Increase Lantus to 20 units once daily (0.5 u/kg dosing)   Increase Novolog to 6  units TID with meals (basal/prandial match)  Low Dose Correction Scale  BG monitoring ac/hs    ** Please call Endocrine for any BG related issues **      Lab Results   Component Value Date    HGBA1C 12.4 (H) 06/20/2025               Marcia Montes NP  Endocrinology  [facility]

## 2025-06-21 NOTE — HPI
A 69-year-old man with NICM, CAD, combined HF, Afib, s/p ICD, s/p LVAD on 1/13/21, DM2, subdural hematoma with subarachnoid hemorrhage in 2022 who presented to a local ED with generalized weakness. This was associated with three falls. EMS services found the patient to be hyperglycemic with blood sugar level noted to be over 500. On evaluation in the ED he was noted to be febrile to 101.1 F. This was associated with dysuria and hesitancy.  He was admitted to [facility] and started on cefepime plus vancomycin. Admission blood cultures on 6/19 are now positive for GNR identified as E coli on BCID.      Infection History:  October 4, 2021- DLI due to P aeruginosa, empiric Cipro plus doxycycline.

## 2025-06-21 NOTE — PROGRESS NOTES
[facility]  Heart Transplant  Progress Note    Patient Name: Rocco France  MRN: [id number]  Admission Date: 6/20/2025  Hospital Length of Stay: 1 days  Attending Physician: Pete Garnett, *  Primary Care Provider: Jay Guardado DO  Principal Problem:Type 2 diabetes mellitus    Subjective:   Interval History: pt with continued improved/stable mentation this AM. Oriented x 4. GNR in blodo cultures supporting initial presentation with septic shock. CT T/A/P pending. Pt appears dry on exam. Lasix discontinued/will cotn to hold.      Discussed case with endocrine, appreciate recommendations. SLP eval today given difficulty with bedside swallow- diabetic diet ordered.         Continuous Infusions:  Scheduled Meds:   amLODIPine  10 mg Oral Daily    atorvastatin  80 mg Oral Daily    ceFEPime IV (PEDS and ADULTS)  2 g Intravenous Q12H    insulin aspart U-100  6 Units Subcutaneous TIDWM    [START ON 6/22/2025] insulin glargine U-100  20 Units Subcutaneous Daily    mirtazapine  30 mg Oral QHS    warfarin  2.5 mg Oral Daily     PRN Meds:  Current Facility-Administered Medications:     chlorproMAZINE, 25 mg, Oral, TID PRN    dextrose 50%, 12.5 g, Intravenous, PRN    dextrose 50%, 25 g, Intravenous, PRN    glucagon (human recombinant), 1 mg, Intramuscular, PRN    glucose, 16 g, Oral, PRN    glucose, 24 g, Oral, PRN    insulin aspart U-100, 0-5 Units, Subcutaneous, QID (AC + HS) PRN    Review of patient's allergies indicates:   Allergen Reactions    Pcn [penicillins] Hives     Objective:     Vital Signs (Most Recent):  Temp: 97.6 °F (36.4 °C) (06/21/25 0730)  Pulse: 97 (06/21/25 0730)  Resp: 16 (06/21/25 0730)  BP: (!) 90/0 (06/21/25 0730)  SpO2: 98 % (06/21/25 0730) Vital Signs (24h Range):  Temp:  [97.6 °F (36.4 °C)-98.7 °F (37.1 °C)] 97.6 °F (36.4 °C)  Pulse:  [79-97] 97  Resp:  [16-18] 16  SpO2:  [98 %-99 %] 98 %  BP: (76-90)/(0) 90/0     Patient Vitals for the past 72 hrs (Last 3 readings):    Weight   06/21/25 0530 72.2 kg (159 lb 2.8 oz)   06/20/25 1510 74.3 kg (163 lb 12.8 oz)   06/20/25 0315 74.3 kg (163 lb 12.8 oz)     Body mass index is 20.44 kg/m².      Intake/Output Summary (Last 24 hours) at 6/21/2025 1055  Last data filed at 6/21/2025 0836  Gross per 24 hour   Intake 938 ml   Output 1000 ml   Net -62 ml       Hemodynamic Parameters:       Telemetry: reviewed       Physical Exam  Vitals reviewed.   Constitutional:       Appearance: He is normal weight.   HENT:      Head: Normocephalic and atraumatic.      Right Ear: External ear normal.      Left Ear: External ear normal.      Nose: Nose normal.      Mouth/Throat:      Mouth: Mucous membranes are moist.      Pharynx: Oropharynx is clear.   Eyes:      Conjunctiva/sclera: Conjunctivae normal.   Cardiovascular:      Rate and Rhythm: Normal rate and regular rhythm.      Pulses: Normal pulses.   Pulmonary:      Effort: Pulmonary effort is normal.      Breath sounds: Normal breath sounds.   Abdominal:      General: Abdomen is flat. Bowel sounds are normal.      Palpations: Abdomen is soft.   Musculoskeletal:      Cervical back: Normal range of motion.      Right lower leg: No edema.      Left lower leg: No edema.   Skin:     General: Skin is warm.      Capillary Refill: Capillary refill takes less than 2 seconds.   Neurological:      Mental Status: He is alert. Mental status is at baseline.   Psychiatric:         Mood and Affect: Mood normal.         Behavior: Behavior normal.            Significant Labs:  CBC:  Recent Labs   Lab 06/19/25  1751 06/20/25  0959 06/21/25  0529   WBC 18.75* 16.65* 14.42*   RBC 3.49* 3.88* 3.33*   HGB 10.5* 11.2* 9.6*   HCT 29.8* 34.1* 28.8*    202 183   MCV 85 88 87   MCH 30.1 28.9 28.8   MCHC 35.2 32.8 33.3     BNP:  Recent Labs   Lab 06/19/25  1751 06/20/25  0959   BNP 3,655* 615*     CMP:  Recent Labs   Lab 06/19/25  1751 06/20/25  0931 06/20/25  0959 06/20/25  1404 06/20/25 2013 06/21/25  0529   *   < >   --  384* 325* 340*   CALCIUM 9.1   < >  --  9.1 8.6* 8.6*   ALBUMIN 2.6*  --  2.6*  --   --   --    PROT 9.6*  --  9.5*  --   --   --    *   < >  --  127* 128* 129*   K 4.4   < >  --  3.9 3.9 3.7   CO2 25   < >  --  26 26 24   CL 87*   < >  --  90* 91* 93*   BUN 32*   < >  --  33* 42* 42*   CREATININE 1.9*   < >  --  1.7* 1.7* 1.6*   ALKPHOS 197*  --  216*  --   --   --    ALT 9*  --  19  --   --   --    AST 32  --  42  --   --   --    BILITOT 2.6*  --  2.7*  --   --   --     < > = values in this interval not displayed.      Coagulation:   Recent Labs   Lab 06/19/25  1751 06/20/25  0959 06/21/25  0529   INR 1.7* 1.8* 1.6*   APTT 34.6* 38.6* 36.5*     LDH:  Recent Labs   Lab 06/20/25  0959 06/21/25  0529   * 217     Microbiology:  Microbiology Results (last 7 days)       Procedure Component Value Units Date/Time    Culture, Anaerobe [[id number]]     Order Status: Sent Specimen: Skin     Aerobic culture [[id number]]     Order Status: Sent Specimen: Skin             I have reviewed all pertinent labs within the past 24 hours.    Estimated Creatinine Clearance: 44.5 mL/min (A) (based on SCr of 1.6 mg/dL (H)).    Diagnostic Results:  I have reviewed and interpreted all pertinent imaging results/findings within the past 24 hours.  Assessment and Plan:     No notes on file    * Type 2 diabetes mellitus  Pt presenting with hyperglycemia of nearly 600. Pt dry on exam on AM eval, overnight initiated on diuresis with IVP, discontinued this morning.   D/w Endocrine, given downtrending glucose levels, will initiate scheduled insulin.   -po intake per SLP recommendations - diabetic diet ordered   -endocrine following, appreciate recs.     Shock  Pt initially presented with elevated lactic and elevated glucose, elevated serum osm but not quite to level of HHS. DKA workup negative. No pH for eval completed. Appears hypovolemic in AM. Suspect this is hypovolemic shock due to poor PO intake, though full history unclear.  Pt reports this Am that he has not been taking in solids or liquids consistently.     -endocrine following, appreciate recs  -stop diuresis, cont to hold   -monitor renal function   -lactic normalized this AM after improvement in glucose levels     Anticoagulated  -cont warfarin per INR   -pharmacy assisting with dosing     LVAD (left ventricular assist device) present  Procedure: Device Interrogation Including analysis of device parameters  Current Settings: Ventricular Assist Device  Review of device function is stable/unstable stable        6/21/2025     8:12 AM 6/21/2025     1:05 AM 6/20/2025     7:45 PM 6/20/2025     4:25 PM 6/20/2025    10:49 AM 6/20/2025     8:21 AM 6/20/2025     4:30 AM   TXP LVAD INTERROGATIONS   Type HeartMate3 HeartMate3 HeartMate3 HeartMate3 HeartMate3 HeartMate3 HeartMate3   Flow 4.6 4.6 4.6 4.7 4.5 4.4 4.2   Speed 5200 5250 5200 5200 5200 5200 5200   PI 5.8 5 4.8 4.9 5.5 4.8 5.9   Power (Edwards) 2.7 3.6 3.6 3.5 3.6 3.6 3.6   LSL 4800 4800 4800 4800 4800 4800    Pulsatility Intermittent pulse Intermittent pulse Intermittent pulse Intermittent pulse Intermittent pulse               Blanca Whitten MD  Heart Transplant  [facility] - Cardiology Stepdown

## 2025-06-21 NOTE — SUBJECTIVE & OBJECTIVE
Past Medical History:   Diagnosis Date    Acute respiratory failure requiring reintubation 1/28/2022    CLARISSE (acute kidney injury) 1/11/2021    Diabetes mellitus     Kidney stones        Past Surgical History:   Procedure Laterality Date    CARDIAC CATHETERIZATION  2010    no stents    CARDIAC DEFIBRILLATOR PLACEMENT  2010    CARDIAC DEFIBRILLATOR PLACEMENT      HERNIA REPAIR      IRRIGATION OF MEDIASTINUM N/A 1/14/2021    Procedure: IRRIGATION, MEDIASTINUM;  Surgeon: Zenon Corona MD;  [facility];  Service: Cardiovascular;  Laterality: N/A;    KNEE ARTHROSCOPY Right     LEFT VENTRICULAR ASSIST DEVICE Left 1/13/2021    Procedure: INSERTION- HeartMate 3 LEFT VENTRICULAR ASSIST DEVICE ;  Surgeon: Zenon Corona MD;  [facility];  Service: Cardiovascular;  Laterality: Left;    RECONSTRUCTION OF PERICARDIUM N/A 1/14/2021    Procedure: RECONSTRUCTION, PERICARDIUM;  Surgeon: Zenon Corona MD;  [facility];  Service: Cardiovascular;  Laterality: N/A;    RIGHT HEART CATHETERIZATION Right 11/2/2020    Procedure: INSERTION, CATHETER, RIGHT HEART;  Surgeon: Deana Lake MD;  Location: [facility] CATH LAB;  Service: Cardiology;  Laterality: Right;    RIGHT HEART CATHETERIZATION Right 3/24/2022    Procedure: INSERTION, CATHETER, RIGHT HEART;  Surgeon: Manuel Ramos Jr., MD;  Location: [facility] CATH LAB;  Service: Cardiology;  Laterality: Right;    STERNAL WOUND CLOSURE  1/13/2021    Procedure: TEMPORARY CLOSURE OF CHEST;  Surgeon: Zenon Corona MD;  [facility];  Service: Cardiovascular;;    STERNAL WOUND CLOSURE N/A 1/14/2021    Procedure: CLOSURE, WOUND, STERNUM;  Surgeon: Zenon Corona MD;  [facility];  Service: Cardiovascular;  Laterality: N/A;       Review of patient's allergies indicates:   Allergen Reactions    Pcn [penicillins] Hives       Medications:  Medications Prior to Admission   Medication Sig    amLODIPine (NORVASC) 5 MG tablet Take 2  tablets (10 mg total) by mouth once daily.    atorvastatin (LIPITOR) 80 MG tablet Take 1 tablet (80 mg total) by mouth once daily.    digoxin (LANOXIN) 125 mcg tablet Take 1 tablet (0.125 mg total) by mouth once daily.    docusate sodium (COLACE) 100 MG capsule Take 100 mg by mouth Daily.    ferrous gluconate (FERGON) 324 MG tablet Take 1 tablet by mouth every morning.    magnesium oxide (MAG-OX) 400 mg (241.3 mg magnesium) tablet TAKE 1 TABLET BY MOUTH THREE TIMES DAILY    mirtazapine (REMERON) 30 MG tablet Take 1 tablet by mouth in the evening    pantoprazole (PROTONIX) 40 MG tablet Take 1 tablet (40 mg total) by mouth once daily. (Patient taking differently: Take 40 mg by mouth once daily. Take in Morning on empty stomach 20 min prior to other medications)    potassium chloride SA (K-DUR,KLOR-CON) 20 MEQ tablet Take 1 tablet (20 mEq total) by mouth 2 (two) times daily.    solifenacin (VESICARE) 5 MG tablet Take 5 mg by mouth once daily.    spironolactone (ALDACTONE) 25 MG tablet Take 1 tablet (25 mg total) by mouth once daily.    torsemide (DEMADEX) 20 MG Tab Take 2 tablets (40 mg total) by mouth 2 (two) times a day.    warfarin (COUMADIN) 5 MG tablet Take 0.5-1 tablets (2.5-5 mg total) by mouth Daily. As directed by the [facility]     Antibiotics (From admission, onward)      Start     Stop Route Frequency Ordered    06/20/25 0530  ceFEPIme injection 2 g         -- IV Every 12 hours (non-standard times) 06/20/25 0245          Antifungals (From admission, onward)      None          Antivirals (From admission, onward)      None             Immunization History   Administered Date(s) Administered    COVID-19, MRNA, LN-S, PF (Pfizer) (Purple Cap) 08/19/2021    COVID-19, mRNA, LNP-S, bivalent booster, PF (PFIZER OMICRON) 09/29/2022    Hepatitis B (recombinant) Adjuvanted, 2 dose 11/17/2020    Influenza (FLUAD) - Quadrivalent - Adjuvanted - PF *Preferred* (65+) 11/17/2020    Influenza - Quadrivalent - PF  *Preferred* (6 months and older) 10/23/2015, 10/22/2019    Pneumococcal Conjugate - 13 Valent 11/17/2020    Pneumococcal Polysaccharide - 23 Valent 10/23/2015    Tdap 11/17/2020    Zoster Recombinant 11/17/2020       Family History       Problem Relation (Age of Onset)    Heart attack Mother, Brother    Heart failure Brother    Hypertension Brother          Social History     Socioeconomic History    Marital status:    Tobacco Use    Smoking status: Some Days     Types: Cigarettes    Smokeless tobacco: Never   Substance and Sexual Activity    Alcohol use: No     Social Drivers of Health     Financial Resource Strain: Patient Declined (6/20/2025)    Overall Financial Resource Strain (CARDIA)     Difficulty of Paying Living Expenses: Patient declined   Food Insecurity: Patient Declined (6/20/2025)    Hunger Vital Sign     Worried About Running Out of Food in the Last Year: Patient declined     Ran Out of Food in the Last Year: Patient declined   Transportation Needs: Patient Declined (6/20/2025)    PRAPARE - Transportation     Lack of Transportation (Medical): Patient declined     Lack of Transportation (Non-Medical): Patient declined   Stress: Patient Declined (6/20/2025)    [place] [employer] - Occupational Stress Questionnaire     Feeling of Stress : Patient declined   Housing Stability: Patient Declined (6/20/2025)    Housing Stability Vital Sign     Unable to Pay for Housing in the Last Year: Patient declined     Homeless in the Last Year: Patient declined     Review of Systems   Constitutional:  Negative for chills, fatigue and fever.   HENT:  Negative for ear pain, mouth sores, nosebleeds, postnasal drip, rhinorrhea, sinus pressure, sore throat, tinnitus, trouble swallowing and voice change.    Eyes:  Negative for photophobia, pain, redness and visual disturbance.   Respiratory:  Negative for apnea, cough, chest tightness, shortness of breath and wheezing.    Cardiovascular:   Negative for chest pain, palpitations and leg swelling.   Gastrointestinal:  Negative for abdominal pain, blood in stool, constipation, diarrhea, nausea and vomiting.   Endocrine: Negative for cold intolerance, heat intolerance, polydipsia and polyuria.   Genitourinary:  Negative for decreased urine volume, difficulty urinating, dysuria, flank pain, frequency, genital sores, hematuria, penile discharge, penile pain, penile swelling, scrotal swelling, testicular pain and urgency.   Musculoskeletal:  Negative for arthralgias, back pain, joint swelling, myalgias and neck pain.   Skin:  Negative for color change and rash.   Allergic/Immunologic: Negative for environmental allergies and food allergies.   Neurological:  Negative for dizziness, seizures, syncope, weakness, light-headedness, numbness and headaches.   Hematological:  Negative for adenopathy. Does not bruise/bleed easily.   Psychiatric/Behavioral:  Negative for agitation, confusion, decreased concentration, hallucinations, self-injury, sleep disturbance and suicidal ideas. The patient is not nervous/anxious.      Objective:     Vital Signs (Most Recent):  Temp: 98.2 °F (36.8 °C) (06/21/25 1130)  Pulse: 89 (06/21/25 1130)  Resp: 18 (06/21/25 1130)  BP: (!) 78/0 (06/21/25 1130)  SpO2: 97 % (06/21/25 1130) Vital Signs (24h Range):  Temp:  [97.6 °F (36.4 °C)-98.7 °F (37.1 °C)] 98.2 °F (36.8 °C)  Pulse:  [] 89  Resp:  [16-18] 18  SpO2:  [97 %-99 %] 97 %  BP: (78-90)/(0) 78/0     Weight: 72.2 kg (159 lb 2.8 oz)  Body mass index is 20.44 kg/m².    Estimated Creatinine Clearance: 41.9 mL/min (A) (based on SCr of 1.7 mg/dL (H)).     Physical Exam  Vitals and nursing note reviewed. Exam conducted with a chaperone present.   Constitutional:       Appearance: Normal appearance.   HENT:      Head: Normocephalic and atraumatic.   Eyes:      General: No scleral icterus.        Right eye: No discharge.         Left eye: No discharge.      Conjunctiva/sclera:  Conjunctivae normal.   Cardiovascular:      Rate and Rhythm: Normal rate and regular rhythm.      Pulses: Normal pulses.      Heart sounds: No murmur heard.     Comments: VAD Humming sounds  Pulmonary:      Effort: Pulmonary effort is normal. No respiratory distress.      Breath sounds: Normal breath sounds. No stridor. No wheezing or rhonchi.   Abdominal:      General: There is no distension.      Palpations: Abdomen is soft.      Tenderness: There is no abdominal tenderness.      Comments: DLES covered. No tenderness to palpation.   Musculoskeletal:      Comments: Right foot covered with compression bandage.   Skin:     General: Skin is warm and dry.   Neurological:      General: No focal deficit present.      Mental Status: He is alert and oriented to person, place, and time.          Significant Labs: CBC:   Recent Labs   Lab 06/19/25  1751 06/20/25  0959 06/21/25  0529   WBC 18.75* 16.65* 14.42*   HGB 10.5* 11.2* 9.6*   HCT 29.8* 34.1* 28.8*    202 183     CMP:   Recent Labs   Lab 06/19/25  1751 06/20/25  0931 06/20/25  0959 06/20/25  1404 06/20/25  2013 06/21/25  0529 06/21/25  1049   *   < >  --    < > 128* 129* 130*   K 4.4   < >  --    < > 3.9 3.7 3.8   CL 87*   < >  --    < > 91* 93* 91*   CO2 25   < >  --    < > 26 24 27   *   < >  --    < > 325* 340* 376*   BUN 32*   < >  --    < > 42* 42* 45*   CREATININE 1.9*   < >  --    < > 1.7* 1.6* 1.7*   CALCIUM 9.1   < >  --    < > 8.6* 8.6* 9.1   PROT 9.6*  --  9.5*  --   --   --   --    ALBUMIN 2.6*  --  2.6*  --   --   --   --    BILITOT 2.6*  --  2.7*  --   --   --   --    ALKPHOS 197*  --  216*  --   --   --   --    AST 32  --  42  --   --   --   --    ALT 9*  --  19  --   --   --   --    ANIONGAP 10   < >  --    < > 11 12 12    < > = values in this interval not displayed.     Microbiology Results (last 7 days)       Procedure Component Value Units Date/Time    Culture, Anaerobe [[id number]]     Order Status: Sent Specimen: Skin      Aerobic culture [[id number]]     Order Status: Sent Specimen: Skin             Significant Imaging: I have reviewed all pertinent imaging results/findings within the past 24 hours.

## 2025-06-21 NOTE — ASSESSMENT & PLAN NOTE
BG goal 140-180  BG remains significantly above goal. Recommend increasing to 0.5 u/kg dosing.    Plan:  Increase Lantus to 20 units once daily (0.5 u/kg dosing)   Increase Novolog to 6  units TID with meals (basal/prandial match)  Low Dose Correction Scale  BG monitoring ac/hs    ** Please call Endocrine for any BG related issues **      Lab Results   Component Value Date    HGBA1C 12.4 (H) 06/20/2025

## 2025-06-21 NOTE — CONSULTS
[facility] - Cardiology Stepdown  Infectious Disease  Consult Note    Patient Name: Rocco France  MRN: [id number]  Admission Date: 6/20/2025  Hospital Length of Stay: 1 days  Attending Physician: Pete Garnett, *  Primary Care Provider: Jay Guardado DO     Isolation Status: No active isolations    Patient information was obtained from past medical records and ER records.      Inpatient consult to Infectious Diseases  Consult performed by: Tali Martinez MD  Consult ordered by: Blanca Whitten MD        Assessment/Plan:     ID  E coli bacteremia  I have reviewed hospital notes from  [facility] service and other specialty providers. I have also reviewed CBC, CMP/BMP,  cultures and imaging with my interpretation as documented.     E coli bacteremia likely from a diabetic toe wound. Blood cultures with GNR identified as E coli on BCID however pending culture results.   Stop cefepime and vancomycin.  Start ceftriaxone 2 gm daily.  Recommend Podiatry evaluation of the right toe wound.   Consider foot x-ray to assess for osteomyelitis.   Discussed management plan with the staff and/or members from [facility] service.          Thank you for your consult. I will follow-up with patient. Please contact us if you have any additional questions.    Tali Tinsley MD  Infectious Disease  [facility] - [facility]    75 minutes of total time spent on the encounter, which includes face to face time and non-face to face time preparing to see the patient (eg, review of tests), obtaining and/or reviewing separately obtained history, documenting clinical information in the electronic or other health record, independently interpreting results (not separately reported) and communicating results to the patient/family/caregiver, or care coordination (not separately reported).     Subjective:     Principal Problem: Type 2 diabetes mellitus    HPI: A 69-year-old man with NICM, CAD, combined HF, Afib, s/p ICD, s/p LVAD on  1/13/21, DM2, subdural hematoma with subarachnoid hemorrhage in 2022 who presented to a local ED with generalized weakness. This was associated with three falls. EMS services found the patient to be hyperglycemic with blood sugar level noted to be over 500. On evaluation in the ED he was noted to be febrile to 101.1 F. This was associated with dysuria and hesitancy.  He was admitted to [facility] and started on cefepime plus vancomycin. Admission blood cultures on 6/19 are now positive for GNR identified as E coli on BCID.      Infection History:  October 4, 2021- DLI due to P aeruginosa, empiric Cipro plus doxycycline.    Past Medical History:   Diagnosis Date    Acute respiratory failure requiring reintubation 1/28/2022    CLARISSE (acute kidney injury) 1/11/2021    Diabetes mellitus     Kidney stones        Past Surgical History:   Procedure Laterality Date    CARDIAC CATHETERIZATION  2010    no stents    CARDIAC DEFIBRILLATOR PLACEMENT  2010    CARDIAC DEFIBRILLATOR PLACEMENT      HERNIA REPAIR      IRRIGATION OF MEDIASTINUM N/A 1/14/2021    Procedure: IRRIGATION, MEDIASTINUM;  Surgeon: Zenon Corona MD;  Location: [facility] OR [address] [place] [address];  Service: Cardiovascular;  Laterality: N/A;    KNEE ARTHROSCOPY Right     LEFT VENTRICULAR ASSIST DEVICE Left 1/13/2021    Procedure: INSERTION- HeartMate 3 LEFT VENTRICULAR ASSIST DEVICE ;  Surgeon: Zenon Corona MD;  [facility];  Service: Cardiovascular;  Laterality: Left;    RECONSTRUCTION OF PERICARDIUM N/A 1/14/2021    Procedure: RECONSTRUCTION, PERICARDIUM;  Surgeon: Zenon Corona MD;  [facility];  Service: Cardiovascular;  Laterality: N/A;    RIGHT HEART CATHETERIZATION Right 11/2/2020    Procedure: INSERTION, CATHETER, RIGHT HEART;  Surgeon: Deana Lake MD;  Location: [facility] CATH LAB;  Service: Cardiology;  Laterality: Right;    RIGHT HEART CATHETERIZATION Right 3/24/2022    Procedure: INSERTION, CATHETER, RIGHT HEART;  Surgeon: Manuel Ramos Jr.,  MD;  Location: [facility] CATH LAB;  Service: Cardiology;  Laterality: Right;    STERNAL WOUND CLOSURE  1/13/2021    Procedure: TEMPORARY CLOSURE OF CHEST;  Surgeon: Zenon Corona MD;  [facility];  Service: Cardiovascular;;    STERNAL WOUND CLOSURE N/A 1/14/2021    Procedure: CLOSURE, WOUND, STERNUM;  Surgeon: Zenon Corona MD;  Location: [facility] OR [facility] FLR;  Service: Cardiovascular;  Laterality: N/A;       Review of patient's allergies indicates:   Allergen Reactions    Pcn [penicillins] Hives       Medications:  Medications Prior to Admission   Medication Sig    amLODIPine (NORVASC) 5 MG tablet Take 2 tablets (10 mg total) by mouth once daily.    atorvastatin (LIPITOR) 80 MG tablet Take 1 tablet (80 mg total) by mouth once daily.    digoxin (LANOXIN) 125 mcg tablet Take 1 tablet (0.125 mg total) by mouth once daily.    docusate sodium (COLACE) 100 MG capsule Take 100 mg by mouth Daily.    ferrous gluconate (FERGON) 324 MG tablet Take 1 tablet by mouth every morning.    magnesium oxide (MAG-OX) 400 mg (241.3 mg magnesium) tablet TAKE 1 TABLET BY MOUTH THREE TIMES DAILY    mirtazapine (REMERON) 30 MG tablet Take 1 tablet by mouth in the evening    pantoprazole (PROTONIX) 40 MG tablet Take 1 tablet (40 mg total) by mouth once daily. (Patient taking differently: Take 40 mg by mouth once daily. Take in Morning on empty stomach 20 min prior to other medications)    potassium chloride SA (K-DUR,KLOR-CON) 20 MEQ tablet Take 1 tablet (20 mEq total) by mouth 2 (two) times daily.    solifenacin (VESICARE) 5 MG tablet Take 5 mg by mouth once daily.    spironolactone (ALDACTONE) 25 MG tablet Take 1 tablet (25 mg total) by mouth once daily.    torsemide (DEMADEX) 20 MG Tab Take 2 tablets (40 mg total) by mouth 2 (two) times a day.    warfarin (COUMADIN) 5 MG tablet Take 0.5-1 tablets (2.5-5 mg total) by mouth Daily. As directed by the [facility]     Antibiotics (From admission, onward)      Start     Stop Route  Frequency Ordered    06/20/25 0530  ceFEPIme injection 2 g         -- IV Every 12 hours (non-standard times) 06/20/25 0245          Antifungals (From admission, onward)      None          Antivirals (From admission, onward)      None             Immunization History   Administered Date(s) Administered    COVID-19, MRNA, LN-S, PF (Pfizer) (Purple Cap) 08/19/2021    COVID-19, mRNA, LNP-S, bivalent booster, PF (PFIZER OMICRON) 09/29/2022    Hepatitis B (recombinant) Adjuvanted, 2 dose 11/17/2020    Influenza (FLUAD) - Quadrivalent - Adjuvanted - PF *Preferred* (65+) 11/17/2020    Influenza - Quadrivalent - PF *Preferred* (6 months and older) 10/23/2015, 10/22/2019    Pneumococcal Conjugate - 13 Valent 11/17/2020    Pneumococcal Polysaccharide - 23 Valent 10/23/2015    Tdap 11/17/2020    Zoster Recombinant 11/17/2020       Family History       Problem Relation (Age of Onset)    Heart attack Mother, Brother    Heart failure Brother    Hypertension Brother          Social History     Socioeconomic History    Marital status:    Tobacco Use    Smoking status: Some Days     Types: Cigarettes    Smokeless tobacco: Never   Substance and Sexual Activity    Alcohol use: No     Social Drivers of Health     Financial Resource Strain: Patient Declined (6/20/2025)    Overall Financial Resource Strain (CARDIA)     Difficulty of Paying Living Expenses: Patient declined   Food Insecurity: Patient Declined (6/20/2025)    Hunger Vital Sign     Worried About Running Out of Food in the Last Year: Patient declined     Ran Out of Food in the Last Year: Patient declined   Transportation Needs: Patient Declined (6/20/2025)    PRAPARE - Transportation     Lack of Transportation (Medical): Patient declined     Lack of Transportation (Non-Medical): Patient declined   Stress: Patient Declined (6/20/2025)    [employer] - Occupational Stress Questionnaire     Feeling of Stress : Patient declined   Housing  Stability: Patient Declined (6/20/2025)    Housing Stability Vital Sign     Unable to Pay for Housing in the Last Year: Patient declined     Homeless in the Last Year: Patient declined     Review of Systems   Constitutional:  Negative for chills, fatigue and fever.   HENT:  Negative for ear pain, mouth sores, nosebleeds, postnasal drip, rhinorrhea, sinus pressure, sore throat, tinnitus, trouble swallowing and voice change.    Eyes:  Negative for photophobia, pain, redness and visual disturbance.   Respiratory:  Negative for apnea, cough, chest tightness, shortness of breath and wheezing.    Cardiovascular:  Negative for chest pain, palpitations and leg swelling.   Gastrointestinal:  Negative for abdominal pain, blood in stool, constipation, diarrhea, nausea and vomiting.   Endocrine: Negative for cold intolerance, heat intolerance, polydipsia and polyuria.   Genitourinary:  Negative for decreased urine volume, difficulty urinating, dysuria, flank pain, frequency, genital sores, hematuria, penile discharge, penile pain, penile swelling, scrotal swelling, testicular pain and urgency.   Musculoskeletal:  Negative for arthralgias, back pain, joint swelling, myalgias and neck pain.   Skin:  Negative for color change and rash.   Allergic/Immunologic: Negative for environmental allergies and food allergies.   Neurological:  Negative for dizziness, seizures, syncope, weakness, light-headedness, numbness and headaches.   Hematological:  Negative for adenopathy. Does not bruise/bleed easily.   Psychiatric/Behavioral:  Negative for agitation, confusion, decreased concentration, hallucinations, self-injury, sleep disturbance and suicidal ideas. The patient is not nervous/anxious.      Objective:     Vital Signs (Most Recent):  Temp: 98.2 °F (36.8 °C) (06/21/25 1130)  Pulse: 89 (06/21/25 1130)  Resp: 18 (06/21/25 1130)  BP: (!) 78/0 (06/21/25 1130)  SpO2: 97 % (06/21/25 1130) Vital Signs (24h Range):  Temp:  [97.6 °F (36.4  °C)-98.7 °F (37.1 °C)] 98.2 °F (36.8 °C)  Pulse:  [] 89  Resp:  [16-18] 18  SpO2:  [97 %-99 %] 97 %  BP: (78-90)/(0) 78/0     Weight: 72.2 kg (159 lb 2.8 oz)  Body mass index is 20.44 kg/m².    Estimated Creatinine Clearance: 41.9 mL/min (A) (based on SCr of 1.7 mg/dL (H)).     Physical Exam  Vitals and nursing note reviewed. Exam conducted with a chaperone present.   Constitutional:       Appearance: Normal appearance.   HENT:      Head: Normocephalic and atraumatic.   Eyes:      General: No scleral icterus.        Right eye: No discharge.         Left eye: No discharge.      Conjunctiva/sclera: Conjunctivae normal.   Cardiovascular:      Rate and Rhythm: Normal rate and regular rhythm.      Pulses: Normal pulses.      Heart sounds: No murmur heard.     Comments: VAD Humming sounds  Pulmonary:      Effort: Pulmonary effort is normal. No respiratory distress.      Breath sounds: Normal breath sounds. No stridor. No wheezing or rhonchi.   Abdominal:      General: There is no distension.      Palpations: Abdomen is soft.      Tenderness: There is no abdominal tenderness.      Comments: DLES covered. No tenderness to palpation.   Musculoskeletal:      Comments: Right foot covered with compression bandage.   Skin:     General: Skin is warm and dry.   Neurological:      General: No focal deficit present.      Mental Status: He is alert and oriented to person, place, and time.          Significant Labs: CBC:   Recent Labs   Lab 06/19/25  1751 06/20/25  0959 06/21/25  0529   WBC 18.75* 16.65* 14.42*   HGB 10.5* 11.2* 9.6*   HCT 29.8* 34.1* 28.8*    202 183     CMP:   Recent Labs   Lab 06/19/25  1751 06/20/25  0931 06/20/25  0959 06/20/25  1404 06/20/25  2013 06/21/25  0529 06/21/25  1049   *   < >  --    < > 128* 129* 130*   K 4.4   < >  --    < > 3.9 3.7 3.8   CL 87*   < >  --    < > 91* 93* 91*   CO2 25   < >  --    < > 26 24 27   *   < >  --    < > 325* 340* 376*   BUN 32*   < >  --    < > 42*  42* 45*   CREATININE 1.9*   < >  --    < > 1.7* 1.6* 1.7*   CALCIUM 9.1   < >  --    < > 8.6* 8.6* 9.1   PROT 9.6*  --  9.5*  --   --   --   --    ALBUMIN 2.6*  --  2.6*  --   --   --   --    BILITOT 2.6*  --  2.7*  --   --   --   --    ALKPHOS 197*  --  216*  --   --   --   --    AST 32  --  42  --   --   --   --    ALT 9*  --  19  --   --   --   --    ANIONGAP 10   < >  --    < > 11 12 12    < > = values in this interval not displayed.     Microbiology Results (last 7 days)       Procedure Component Value Units Date/Time    Culture, Anaerobe [[id number]]     Order Status: Sent Specimen: Skin     Aerobic culture [[id number]]     Order Status: Sent Specimen: Skin             Significant Imaging: I have reviewed all pertinent imaging results/findings within the past 24 hours.

## 2025-06-21 NOTE — ASSESSMENT & PLAN NOTE
I have reviewed hospital notes from  [facility] service and other specialty providers. I have also reviewed CBC, CMP/BMP,  cultures and imaging with my interpretation as documented.     E coli bacteremia likely from a diabetic toe wound. Blood cultures with GNR identified as E coli on BCID however pending culture results.   Stop cefepime and vancomycin.  Start ceftriaxone 2 gm daily.  Recommend Podiatry evaluation of the right toe wound.   Consider foot x-ray to assess for osteomyelitis.   Discussed management plan with the staff and/or members from [facility] service.

## 2025-06-21 NOTE — ASSESSMENT & PLAN NOTE
Pt presenting with hyperglycemia of nearly 600. Pt dry on exam on AM eval, overnight initiated on diuresis with IVP, discontinued this morning.   D/w Endocrine, given downtrending glucose levels, will initiate scheduled insulin.   -po intake per SLP recommendations - diabetic diet ordered   -endocrine following, appreciate recs.

## 2025-06-21 NOTE — NURSING
Plan of care discussed with patient. Patient ambulating with one person assist, fall precautions in place. LVAD DP and numbers WNL, smooth LVAD hum. Patient has no complaints of pain. Discussed medications and care. Patient has no questions at this time.

## 2025-06-21 NOTE — PROGRESS NOTES
Pharmacokinetic Assessment Follow Up: IV Vancomycin    Therapy with Vancomycin complete and/or consult discontinued by provider.  Pharmacy will sign off, please re-consult as needed.     Emile Good PharmD

## 2025-06-22 ENCOUNTER — ANESTHESIA EVENT (OUTPATIENT)
Dept: SURGERY | Facility: HOSPITAL | Age: 70
End: 2025-06-22
Payer: MEDICARE

## 2025-06-22 ENCOUNTER — ANESTHESIA (OUTPATIENT)
Dept: SURGERY | Facility: HOSPITAL | Age: 70
End: 2025-06-22
Payer: MEDICARE

## 2025-06-22 PROBLEM — M86.171 ACUTE OSTEOMYELITIS OF TOE, RIGHT: Status: ACTIVE | Noted: 2025-06-22

## 2025-06-22 LAB
ABSOLUTE EOSINOPHIL (OHS): 0.27 K/UL
ABSOLUTE MONOCYTE (OHS): 1.77 K/UL (ref 0.3–1)
ABSOLUTE NEUTROPHIL COUNT (OHS): 8.32 K/UL (ref 1.8–7.7)
ANION GAP (OHS): 10 MMOL/L (ref 8–16)
ANION GAP (OHS): 11 MMOL/L (ref 8–16)
APTT PPP: 34.3 SECONDS (ref 21–32)
BACTERIA BLD CULT: ABNORMAL
BACTERIA BLD CULT: ABNORMAL
BASOPHILS # BLD AUTO: 0.05 K/UL
BASOPHILS NFR BLD AUTO: 0.4 %
BUN SERPL-MCNC: 30 MG/DL (ref 8–23)
BUN SERPL-MCNC: 33 MG/DL (ref 8–23)
CALCIUM SERPL-MCNC: 9.1 MG/DL (ref 8.7–10.5)
CALCIUM SERPL-MCNC: 9.2 MG/DL (ref 8.7–10.5)
CHLORIDE SERPL-SCNC: 98 MMOL/L (ref 95–110)
CHLORIDE SERPL-SCNC: 98 MMOL/L (ref 95–110)
CO2 SERPL-SCNC: 25 MMOL/L (ref 23–29)
CO2 SERPL-SCNC: 26 MMOL/L (ref 23–29)
CREAT SERPL-MCNC: 1.2 MG/DL (ref 0.5–1.4)
CREAT SERPL-MCNC: 1.3 MG/DL (ref 0.5–1.4)
ERYTHROCYTE [DISTWIDTH] IN BLOOD BY AUTOMATED COUNT: 14 % (ref 11.5–14.5)
GFR SERPLBLD CREATININE-BSD FMLA CKD-EPI: 59 ML/MIN/1.73/M2
GFR SERPLBLD CREATININE-BSD FMLA CKD-EPI: >60 ML/MIN/1.73/M2
GLUCOSE SERPL-MCNC: 148 MG/DL (ref 70–110)
GLUCOSE SERPL-MCNC: 184 MG/DL (ref 70–110)
GRAM STN SPEC: ABNORMAL
GRAM STN SPEC: NORMAL
HCT VFR BLD AUTO: 30.6 % (ref 40–54)
HGB BLD-MCNC: 9.9 GM/DL (ref 14–18)
IMM GRANULOCYTES # BLD AUTO: 0.11 K/UL (ref 0–0.04)
IMM GRANULOCYTES NFR BLD AUTO: 0.9 % (ref 0–0.5)
INR PPP: 1.5 (ref 0.8–1.2)
LDH SERPL-CCNC: 203 U/L (ref 110–260)
LYMPHOCYTES # BLD AUTO: 1.71 K/UL (ref 1–4.8)
MAGNESIUM SERPL-MCNC: 2.1 MG/DL (ref 1.6–2.6)
MCH RBC QN AUTO: 28.4 PG (ref 27–31)
MCHC RBC AUTO-ENTMCNC: 32.4 G/DL (ref 32–36)
MCV RBC AUTO: 88 FL (ref 82–98)
NUCLEATED RBC (/100WBC) (OHS): 0 /100 WBC
OHS QRS DURATION: 162 MS
OHS QTC CALCULATION: 450 MS
PHOSPHATE SERPL-MCNC: 2.1 MG/DL (ref 2.7–4.5)
PLATELET # BLD AUTO: 195 K/UL (ref 150–450)
PMV BLD AUTO: 11 FL (ref 9.2–12.9)
POCT GLUCOSE: 167 MG/DL (ref 70–110)
POCT GLUCOSE: 177 MG/DL (ref 70–110)
POCT GLUCOSE: 178 MG/DL (ref 70–110)
POCT GLUCOSE: 189 MG/DL (ref 70–110)
POCT GLUCOSE: 397 MG/DL (ref 70–110)
POTASSIUM SERPL-SCNC: 3.7 MMOL/L (ref 3.5–5.1)
POTASSIUM SERPL-SCNC: 3.9 MMOL/L (ref 3.5–5.1)
PROTHROMBIN TIME: 15.5 SECONDS (ref 9–12.5)
RBC # BLD AUTO: 3.48 M/UL (ref 4.6–6.2)
RELATIVE EOSINOPHIL (OHS): 2.2 %
RELATIVE LYMPHOCYTE (OHS): 14 % (ref 18–48)
RELATIVE MONOCYTE (OHS): 14.5 % (ref 4–15)
RELATIVE NEUTROPHIL (OHS): 68 % (ref 38–73)
SODIUM SERPL-SCNC: 133 MMOL/L (ref 136–145)
SODIUM SERPL-SCNC: 135 MMOL/L (ref 136–145)
WBC # BLD AUTO: 12.23 K/UL (ref 3.9–12.7)

## 2025-06-22 PROCEDURE — 83735 ASSAY OF MAGNESIUM: CPT | Performed by: STUDENT IN AN ORGANIZED HEALTH CARE EDUCATION/TRAINING PROGRAM

## 2025-06-22 PROCEDURE — 37000009 HC ANESTHESIA EA ADD 15 MINS: Performed by: STUDENT IN AN ORGANIZED HEALTH CARE EDUCATION/TRAINING PROGRAM

## 2025-06-22 PROCEDURE — 94761 N-INVAS EAR/PLS OXIMETRY MLT: CPT

## 2025-06-22 PROCEDURE — 84100 ASSAY OF PHOSPHORUS: CPT | Performed by: STUDENT IN AN ORGANIZED HEALTH CARE EDUCATION/TRAINING PROGRAM

## 2025-06-22 PROCEDURE — 85730 THROMBOPLASTIN TIME PARTIAL: CPT | Performed by: STUDENT IN AN ORGANIZED HEALTH CARE EDUCATION/TRAINING PROGRAM

## 2025-06-22 PROCEDURE — 37000008 HC ANESTHESIA 1ST 15 MINUTES: Performed by: STUDENT IN AN ORGANIZED HEALTH CARE EDUCATION/TRAINING PROGRAM

## 2025-06-22 PROCEDURE — 25000003 PHARM REV CODE 250

## 2025-06-22 PROCEDURE — 88305 TISSUE EXAM BY PATHOLOGIST: CPT | Mod: 26,,, | Performed by: PATHOLOGY

## 2025-06-22 PROCEDURE — 71000015 HC POSTOP RECOV 1ST HR: Performed by: STUDENT IN AN ORGANIZED HEALTH CARE EDUCATION/TRAINING PROGRAM

## 2025-06-22 PROCEDURE — 87075 CULTR BACTERIA EXCEPT BLOOD: CPT | Performed by: STUDENT IN AN ORGANIZED HEALTH CARE EDUCATION/TRAINING PROGRAM

## 2025-06-22 PROCEDURE — 83615 LACTATE (LD) (LDH) ENZYME: CPT | Performed by: STUDENT IN AN ORGANIZED HEALTH CARE EDUCATION/TRAINING PROGRAM

## 2025-06-22 PROCEDURE — 36000706: Performed by: STUDENT IN AN ORGANIZED HEALTH CARE EDUCATION/TRAINING PROGRAM

## 2025-06-22 PROCEDURE — 85025 COMPLETE CBC W/AUTO DIFF WBC: CPT | Performed by: STUDENT IN AN ORGANIZED HEALTH CARE EDUCATION/TRAINING PROGRAM

## 2025-06-22 PROCEDURE — G0545 PR VISIT INHERENT TO INPT OR OBS CARE, INFECTIOUS DISEASE: HCPCS | Mod: ,,, | Performed by: INTERNAL MEDICINE

## 2025-06-22 PROCEDURE — 25000003 PHARM REV CODE 250: Performed by: INTERNAL MEDICINE

## 2025-06-22 PROCEDURE — 25000003 PHARM REV CODE 250: Performed by: STUDENT IN AN ORGANIZED HEALTH CARE EDUCATION/TRAINING PROGRAM

## 2025-06-22 PROCEDURE — 93750 INTERROGATION VAD IN PERSON: CPT | Mod: ,,, | Performed by: INTERNAL MEDICINE

## 2025-06-22 PROCEDURE — 63600175 PHARM REV CODE 636 W HCPCS

## 2025-06-22 PROCEDURE — 80048 BASIC METABOLIC PNL TOTAL CA: CPT

## 2025-06-22 PROCEDURE — 87205 SMEAR GRAM STAIN: CPT | Performed by: STUDENT IN AN ORGANIZED HEALTH CARE EDUCATION/TRAINING PROGRAM

## 2025-06-22 PROCEDURE — 36000707: Performed by: STUDENT IN AN ORGANIZED HEALTH CARE EDUCATION/TRAINING PROGRAM

## 2025-06-22 PROCEDURE — 0Y6P0Z0 DETACHMENT AT RIGHT 1ST TOE, COMPLETE, OPEN APPROACH: ICD-10-PCS | Performed by: STUDENT IN AN ORGANIZED HEALTH CARE EDUCATION/TRAINING PROGRAM

## 2025-06-22 PROCEDURE — 71000033 HC RECOVERY, INTIAL HOUR: Performed by: STUDENT IN AN ORGANIZED HEALTH CARE EDUCATION/TRAINING PROGRAM

## 2025-06-22 PROCEDURE — 87206 SMEAR FLUORESCENT/ACID STAI: CPT | Performed by: STUDENT IN AN ORGANIZED HEALTH CARE EDUCATION/TRAINING PROGRAM

## 2025-06-22 PROCEDURE — 85610 PROTHROMBIN TIME: CPT | Performed by: STUDENT IN AN ORGANIZED HEALTH CARE EDUCATION/TRAINING PROGRAM

## 2025-06-22 PROCEDURE — 36415 COLL VENOUS BLD VENIPUNCTURE: CPT

## 2025-06-22 PROCEDURE — 87102 FUNGUS ISOLATION CULTURE: CPT | Performed by: STUDENT IN AN ORGANIZED HEALTH CARE EDUCATION/TRAINING PROGRAM

## 2025-06-22 PROCEDURE — 28810 AMPUTATION TOE & METATARSAL: CPT | Mod: T5,,, | Performed by: STUDENT IN AN ORGANIZED HEALTH CARE EDUCATION/TRAINING PROGRAM

## 2025-06-22 PROCEDURE — 63600175 PHARM REV CODE 636 W HCPCS: Performed by: STUDENT IN AN ORGANIZED HEALTH CARE EDUCATION/TRAINING PROGRAM

## 2025-06-22 PROCEDURE — 36415 COLL VENOUS BLD VENIPUNCTURE: CPT | Performed by: STUDENT IN AN ORGANIZED HEALTH CARE EDUCATION/TRAINING PROGRAM

## 2025-06-22 PROCEDURE — 20600001 HC STEP DOWN PRIVATE ROOM

## 2025-06-22 PROCEDURE — 63600175 PHARM REV CODE 636 W HCPCS: Performed by: INTERNAL MEDICINE

## 2025-06-22 PROCEDURE — 99233 SBSQ HOSP IP/OBS HIGH 50: CPT | Mod: GC,,, | Performed by: INTERNAL MEDICINE

## 2025-06-22 PROCEDURE — 87116 MYCOBACTERIA CULTURE: CPT | Performed by: STUDENT IN AN ORGANIZED HEALTH CARE EDUCATION/TRAINING PROGRAM

## 2025-06-22 PROCEDURE — 82962 GLUCOSE BLOOD TEST: CPT | Performed by: STUDENT IN AN ORGANIZED HEALTH CARE EDUCATION/TRAINING PROGRAM

## 2025-06-22 PROCEDURE — 99233 SBSQ HOSP IP/OBS HIGH 50: CPT | Mod: ,,, | Performed by: INTERNAL MEDICINE

## 2025-06-22 PROCEDURE — 88311 DECALCIFY TISSUE: CPT | Mod: 26,,, | Performed by: PATHOLOGY

## 2025-06-22 PROCEDURE — 87077 CULTURE AEROBIC IDENTIFY: CPT | Performed by: STUDENT IN AN ORGANIZED HEALTH CARE EDUCATION/TRAINING PROGRAM

## 2025-06-22 PROCEDURE — 88311 DECALCIFY TISSUE: CPT | Mod: TC | Performed by: STUDENT IN AN ORGANIZED HEALTH CARE EDUCATION/TRAINING PROGRAM

## 2025-06-22 PROCEDURE — 27000248 HC VAD-ADDITIONAL DAY

## 2025-06-22 RX ORDER — WARFARIN SODIUM 5 MG/1
5 TABLET ORAL DAILY
Status: DISCONTINUED | OUTPATIENT
Start: 2025-06-22 | End: 2025-06-23

## 2025-06-22 RX ORDER — INSULIN ASPART 100 [IU]/ML
8 INJECTION, SOLUTION INTRAVENOUS; SUBCUTANEOUS
Status: DISCONTINUED | OUTPATIENT
Start: 2025-06-22 | End: 2025-06-25

## 2025-06-22 RX ORDER — PROPOFOL 10 MG/ML
VIAL (ML) INTRAVENOUS
Status: DISCONTINUED | OUTPATIENT
Start: 2025-06-22 | End: 2025-06-22

## 2025-06-22 RX ORDER — ONDANSETRON HYDROCHLORIDE 2 MG/ML
4 INJECTION, SOLUTION INTRAVENOUS DAILY PRN
Status: DISCONTINUED | OUTPATIENT
Start: 2025-06-22 | End: 2025-06-30 | Stop reason: HOSPADM

## 2025-06-22 RX ORDER — DEXMEDETOMIDINE HYDROCHLORIDE 100 UG/ML
INJECTION, SOLUTION INTRAVENOUS
Status: DISCONTINUED | OUTPATIENT
Start: 2025-06-22 | End: 2025-06-22

## 2025-06-22 RX ORDER — MIDAZOLAM HYDROCHLORIDE 1 MG/ML
INJECTION INTRAMUSCULAR; INTRAVENOUS
Status: DISCONTINUED | OUTPATIENT
Start: 2025-06-22 | End: 2025-06-22

## 2025-06-22 RX ORDER — CHLORPROMAZINE HYDROCHLORIDE 25 MG/1
25 TABLET, FILM COATED ORAL 3 TIMES DAILY PRN
Status: DISCONTINUED | OUTPATIENT
Start: 2025-06-22 | End: 2025-06-30 | Stop reason: HOSPADM

## 2025-06-22 RX ORDER — CEFAZOLIN 2 G/1
INJECTION, POWDER, FOR SOLUTION INTRAMUSCULAR; INTRAVENOUS
Status: DISCONTINUED | OUTPATIENT
Start: 2025-06-22 | End: 2025-06-22

## 2025-06-22 RX ORDER — ETOMIDATE 2 MG/ML
INJECTION INTRAVENOUS
Status: DISCONTINUED | OUTPATIENT
Start: 2025-06-22 | End: 2025-06-22

## 2025-06-22 RX ORDER — INSULIN GLARGINE 100 [IU]/ML
24 INJECTION, SOLUTION SUBCUTANEOUS DAILY
Status: DISCONTINUED | OUTPATIENT
Start: 2025-06-22 | End: 2025-06-27

## 2025-06-22 RX ORDER — PHENYLEPHRINE HYDROCHLORIDE 10 MG/ML
INJECTION INTRAVENOUS
Status: DISCONTINUED | OUTPATIENT
Start: 2025-06-22 | End: 2025-06-22

## 2025-06-22 RX ORDER — POTASSIUM CHLORIDE 750 MG/1
30 CAPSULE, EXTENDED RELEASE ORAL ONCE
Status: COMPLETED | OUTPATIENT
Start: 2025-06-22 | End: 2025-06-22

## 2025-06-22 RX ORDER — FENTANYL CITRATE 50 UG/ML
25 INJECTION, SOLUTION INTRAMUSCULAR; INTRAVENOUS EVERY 5 MIN PRN
Refills: 0 | Status: DISCONTINUED | OUTPATIENT
Start: 2025-06-22 | End: 2025-06-30 | Stop reason: HOSPADM

## 2025-06-22 RX ORDER — GLUCAGON 1 MG
1 KIT INJECTION
Status: DISCONTINUED | OUTPATIENT
Start: 2025-06-22 | End: 2025-06-24

## 2025-06-22 RX ORDER — BUPIVACAINE HYDROCHLORIDE 5 MG/ML
INJECTION, SOLUTION EPIDURAL; INTRACAUDAL; PERINEURAL
Status: DISCONTINUED | OUTPATIENT
Start: 2025-06-22 | End: 2025-06-22 | Stop reason: HOSPADM

## 2025-06-22 RX ORDER — POLYETHYLENE GLYCOL 3350 17 G/17G
17 POWDER, FOR SOLUTION ORAL DAILY
Status: DISCONTINUED | OUTPATIENT
Start: 2025-06-22 | End: 2025-06-30 | Stop reason: HOSPADM

## 2025-06-22 RX ORDER — VASOPRESSIN 20 [USP'U]/ML
INJECTION, SOLUTION INTRAMUSCULAR; SUBCUTANEOUS
Status: DISCONTINUED | OUTPATIENT
Start: 2025-06-22 | End: 2025-06-22

## 2025-06-22 RX ORDER — LIDOCAINE HYDROCHLORIDE 10 MG/ML
INJECTION, SOLUTION INFILTRATION; PERINEURAL
Status: DISCONTINUED | OUTPATIENT
Start: 2025-06-22 | End: 2025-06-22 | Stop reason: HOSPADM

## 2025-06-22 RX ADMIN — DEXMEDETOMIDINE 4 MCG: 100 INJECTION, SOLUTION, CONCENTRATE INTRAVENOUS at 02:06

## 2025-06-22 RX ADMIN — INSULIN GLARGINE 24 UNITS: 100 INJECTION, SOLUTION SUBCUTANEOUS at 08:06

## 2025-06-22 RX ADMIN — WARFARIN SODIUM 5 MG: 5 TABLET ORAL at 04:06

## 2025-06-22 RX ADMIN — POTASSIUM CHLORIDE 30 MEQ: 750 CAPSULE, EXTENDED RELEASE ORAL at 08:06

## 2025-06-22 RX ADMIN — CEFAZOLIN 2 G: 2 INJECTION, POWDER, FOR SOLUTION INTRAMUSCULAR; INTRAVENOUS at 02:06

## 2025-06-22 RX ADMIN — MIRTAZAPINE 30 MG: 30 TABLET, FILM COATED ORAL at 08:06

## 2025-06-22 RX ADMIN — AMLODIPINE BESYLATE 10 MG: 10 TABLET ORAL at 08:06

## 2025-06-22 RX ADMIN — ATORVASTATIN CALCIUM 80 MG: 40 TABLET, FILM COATED ORAL at 08:06

## 2025-06-22 RX ADMIN — PROPOFOL 10 MG: 10 INJECTION, EMULSION INTRAVENOUS at 02:06

## 2025-06-22 RX ADMIN — VASOPRESSIN 2 UNITS: 20 INJECTION INTRAVENOUS at 02:06

## 2025-06-22 RX ADMIN — PHENYLEPHRINE HYDROCHLORIDE 100 MCG: 10 INJECTION INTRAVENOUS at 02:06

## 2025-06-22 RX ADMIN — INSULIN ASPART 8 UNITS: 100 INJECTION, SOLUTION INTRAVENOUS; SUBCUTANEOUS at 04:06

## 2025-06-22 RX ADMIN — PHENYLEPHRINE HYDROCHLORIDE 200 MCG: 10 INJECTION INTRAVENOUS at 02:06

## 2025-06-22 RX ADMIN — SODIUM CHLORIDE: 0.9 INJECTION, SOLUTION INTRAVENOUS at 01:06

## 2025-06-22 RX ADMIN — POLYETHYLENE GLYCOL 3350 17 G: 17 POWDER, FOR SOLUTION ORAL at 09:06

## 2025-06-22 RX ADMIN — MIDAZOLAM HYDROCHLORIDE 1 MG: 2 INJECTION, SOLUTION INTRAMUSCULAR; INTRAVENOUS at 01:06

## 2025-06-22 RX ADMIN — DEXMEDETOMIDINE 4 MCG: 100 INJECTION, SOLUTION, CONCENTRATE INTRAVENOUS at 01:06

## 2025-06-22 RX ADMIN — CHLORPROMAZINE HYDROCHLORIDE 25 MG: 25 TABLET, FILM COATED ORAL at 09:06

## 2025-06-22 RX ADMIN — CEFTRIAXONE 2 G: 2 INJECTION, POWDER, FOR SOLUTION INTRAMUSCULAR; INTRAVENOUS at 04:06

## 2025-06-22 RX ADMIN — ETOMIDATE 5 MG: 2 INJECTION, SOLUTION INTRAVENOUS at 01:06

## 2025-06-22 RX ADMIN — MUPIROCIN: 20 OINTMENT TOPICAL at 08:06

## 2025-06-22 NOTE — OP NOTE
[facility] - Surgery (2nd Fl)  Operative Note      Date of Procedure: 6/22/2025     Procedure: Procedure(s) (LRB):  AMPUTATION, TOE, THROUGH METATARSAL HEAD (Right)     Surgeons and Role:     * Latasha Elise DPM - Primary    Assisting Surgeon: Kimo Gregory DPM- Resident     Pre-Operative Diagnosis: Diabetic ulcer of toe of right foot associated with type 2 diabetes mellitus, with necrosis of bone [E11.621, L97.514]    Post-Operative Diagnosis: Post-Op Diagnosis Codes:     * Diabetic ulcer of toe of right foot associated with type 2 diabetes mellitus, with necrosis of bone [E11.621, L97.514]    Anesthesia: * No anesthesia type entered *    Operative Findings (including complications, if any): Right partial 1st ray amputation. Notable purulent drainage observed upon incision along with gas bubbles. Closed with 3-0 Monocryl and 3-0 Nylon.     Description of Technical Procedures: Discussion with patient regarding the procedure was had, patient understands all risks, potential complications, and alternatives, including but not limited to those listed on the consent form. No guarantees given or implied as to outcome. Informed verbal and written consent was obtained. Patient verbalized understanding and would like to proceed with surgery. Consent discussed, signed, and witnessed appropriately. Patient was marked. Patient was wheeled into operating room and transferred from stretcher to table in supine position. A 1:1 mixture of 20 cc of 1% lidocaine plain and .25% Marcaine plain was injected around the first ray in a Valladares block like fashion.  A tourniquet was applied to the right ankle and was inflated to 250 mmHg. Patient's foot was prepped aseptically.     Attention was then pain to the first ray where a racquet type incision was made around the hallux and overlying the 1st ray.  Using a # 15 blade incision was made following the markings. Upon the incision purulence was observed emanating from the surgical site  along with gaseous bubbles.  It was at this time that the tourniquet was let down. The hallux was then removed using a #15 blade. Toe was sent as specimen to pathology.  Next, using a sagittal saw the head of the 1st metatarsal was removed at the surgical neck.  A rongeur and 15 blade were then used to remove sesamoids and nonviable tissue.     Surgical site irrigated with saline via pulse lavage. Clean margin specimens collected from metatarsal head, sent for micro and path. Layered primary closure performed using 3-0 Monocryl and 3-0 nylon suture. The surgical site was then dressed with xeroform, 4x4 gauze, ABD pad, cast padding, and ACE wrap. Patient was un-induced from anesthesia, and transferred from OR table to stretcher and wheeled to PACU for postoperative management and further care inpatient.     Estimated Blood Loss (EBL): * No values recorded between 6/22/2025  1:44 PM and 6/22/2025  3:12 PM *           Implants: * No implants in log *    Specimens:   Specimen (24h ago, onward)       Start     Ordered    06/22/25 1452  Specimen to Pathology Podiatry  RELEASE UPON ORDERING        References:    Click here for ordering Quick Tip   Question:  Release to patient  Answer:  Immediate    06/22/25 1452                   ID Type Source Tests Collected by Time Destination   1 : 1. partial 1st ray amputation right foot Tissue Toe, Right Foot SPECIMEN TO PATHOLOGY Latasha Elise, [facility] 6/22/2025 1433    2 : 2. Clean margins right foot 1st metatarsal Bone Toe, Right Foot SPECIMEN TO PATHOLOGY Latasha Elise DP 6/22/2025 1448    A : bone 1st metatarsal Bone Toe, Right Foot CULTURE, ANAEROBIC, CULTURE, FUNGUS, GRAM STAIN, CULTURE, AEROBIC  (SPECIFY SOURCE), AFB CULTURE & SMEAR Latasha Elise, [facility] 6/22/2025 1450               Condition: Stable    Disposition: PACU - hemodynamically stable.    Attestation: I was present and scrubbed for the entire procedure.

## 2025-06-22 NOTE — NURSING
Podiatry Dr at bedside. Discussed plan of care. MD assessing foot. Dopple 84/0 . Patient slightly anxious as plan of care may involve possible surgery in the morning with STAT CT of right foot placed today. Will continue to monitor for changes and provide emotional support.   Bed locked and in lowest position, bed alarm on. Call light within reach. LVAD to wall power and continuous cardiac monitoring in place.

## 2025-06-22 NOTE — NURSING
Patient family updated over the phone per patient request and with verbal consent.     Anesthesia Dr at bedside patient somnolent. Numbers for family provided to doctor.

## 2025-06-22 NOTE — NURSING TRANSFER
Nursing Transfer Note      6/22/2025   4:12 PM      Reason patient is being transferred: post procedure    Transfer To: [id number]    Transfer via bed    Transfer with cardiac monitoring    Transported by 2 RN    Order for Tele Monitor? Yes    Additional Lines: lvad    Medicines sent: no    Any special needs or follow-up needed: routine    Patient belongings transferred with patient: lvad emergency bag, extra 2 batteries, and lvad monior    Chart send with patient: Yes    Notified: spouse, son    Patient reassessed at: 06/22/2025 @ 1510  (date, time)  1  Upon arrival to floor: patient oriented to room, call bell in reach, and bed in lowest position

## 2025-06-22 NOTE — PLAN OF CARE
Problem: Adult Inpatient Plan of Care  Goal: Plan of Care Review  Outcome: Progressing  Goal: Patient-Specific Goal (Individualized)  Outcome: Progressing  Goal: Absence of Hospital-Acquired Illness or Injury  Outcome: Progressing  Goal: Optimal Comfort and Wellbeing  Outcome: Progressing  Goal: Readiness for Transition of Care  Outcome: Progressing     Problem: Diabetes Comorbidity  Goal: Blood Glucose Level Within Targeted Range  Outcome: Progressing     Problem: Sepsis/Septic Shock  Goal: Optimal Coping  Outcome: Progressing  Goal: Absence of Bleeding  Outcome: Progressing  Goal: Blood Glucose Level Within Targeted Range  Outcome: Progressing  Goal: Absence of Infection Signs and Symptoms  Outcome: Progressing  Goal: Optimal Nutrition Intake  Outcome: Progressing     Problem: Ventricular Assist Device  Goal: Optimal Adjustment to Device  Outcome: Progressing  Goal: Absence of Bleeding  Outcome: Progressing  Goal: Absence of Embolism Signs and Symptoms  Outcome: Progressing  Goal: Optimal Blood Flow  Outcome: Progressing  Goal: Absence of Infection Signs and Symptoms  Outcome: Progressing  Goal: Effective Right-Sided Heart Function  Outcome: Progressing     Problem: Wound  Goal: Optimal Coping  Outcome: Progressing  Goal: Optimal Functional Ability  Outcome: Progressing  Goal: Absence of Infection Signs and Symptoms  Outcome: Progressing  Goal: Improved Oral Intake  Outcome: Progressing  Goal: Optimal Pain Control and Function  Outcome: Progressing  Goal: Skin Health and Integrity  Outcome: Progressing  Goal: Optimal Wound Healing  Outcome: Progressing     Problem: Skin Injury Risk Increased  Goal: Skin Health and Integrity  Outcome: Progressing     Problem: Fall Injury Risk  Goal: Absence of Fall and Fall-Related Injury  Outcome: Progressing

## 2025-06-22 NOTE — SUBJECTIVE & OBJECTIVE
"Interval History: "OK". Pending toe amputation.    Review of Systems   Constitutional:  Negative for chills, fatigue and fever.   HENT:  Negative for ear pain, mouth sores, nosebleeds, postnasal drip, rhinorrhea, sinus pressure, sore throat, tinnitus, trouble swallowing and voice change.    Eyes:  Negative for photophobia, pain, redness and visual disturbance.   Respiratory:  Negative for apnea, cough, chest tightness, shortness of breath and wheezing.    Cardiovascular:  Negative for chest pain, palpitations and leg swelling.   Gastrointestinal:  Negative for abdominal pain, blood in stool, constipation, diarrhea, nausea and vomiting.   Endocrine: Negative for cold intolerance, heat intolerance, polydipsia and polyuria.   Genitourinary:  Negative for decreased urine volume, difficulty urinating, dysuria, flank pain, frequency, genital sores, hematuria, penile discharge, penile pain, penile swelling, scrotal swelling, testicular pain and urgency.   Musculoskeletal:  Negative for arthralgias, back pain, joint swelling, myalgias and neck pain.   Skin:  Positive for wound. Negative for color change and rash.   Allergic/Immunologic: Negative for environmental allergies and food allergies.   Neurological:  Negative for dizziness, seizures, syncope, weakness, light-headedness, numbness and headaches.   Hematological:  Negative for adenopathy. Does not bruise/bleed easily.   Psychiatric/Behavioral:  Negative for agitation, confusion, decreased concentration, hallucinations, self-injury, sleep disturbance and suicidal ideas. The patient is not nervous/anxious.      Objective:     Vital Signs (Most Recent):  Temp: 97.8 °F (36.6 °C) (06/22/25 1130)  Pulse: 85 (06/22/25 1218)  Resp: 16 (06/22/25 1130)  BP: (!) 79/0 (06/22/25 1130)  SpO2: 99 % (06/22/25 1130) Vital Signs (24h Range):  Temp:  [97.8 °F (36.6 °C)-98.3 °F (36.8 °C)] 97.8 °F (36.6 °C)  Pulse:  [76-97] 85  Resp:  [16-20] 16  SpO2:  [97 %-99 %] 99 %  BP: (76-84)/(0) " 79/0     Weight: 72.2 kg (159 lb 2.8 oz)  Body mass index is 20.44 kg/m².    Estimated Creatinine Clearance: 59.3 mL/min (based on SCr of 1.2 mg/dL).     Physical Exam  Vitals and nursing note reviewed. Exam conducted with a chaperone present.   Constitutional:       Appearance: Normal appearance.   HENT:      Head: Normocephalic and atraumatic.   Eyes:      General: No scleral icterus.        Right eye: No discharge.         Left eye: No discharge.      Conjunctiva/sclera: Conjunctivae normal.   Cardiovascular:      Rate and Rhythm: Normal rate and regular rhythm.      Pulses: Normal pulses.      Heart sounds: No murmur heard.     Comments: VAD Humming sounds  Pulmonary:      Effort: Pulmonary effort is normal. No respiratory distress.      Breath sounds: Normal breath sounds. No stridor. No wheezing or rhonchi.   Abdominal:      General: There is no distension.      Palpations: Abdomen is soft.      Tenderness: There is no abdominal tenderness.      Comments: DLES covered. No tenderness to palpation.   Musculoskeletal:      Comments: Right foot covered with compression bandage.   Skin:     General: Skin is warm and dry.   Neurological:      General: No focal deficit present.      Mental Status: He is alert and oriented to person, place, and time.          Significant Labs: CBC:   Recent Labs   Lab 06/21/25  0529 06/22/25  0733   WBC 14.42* 12.23   HGB 9.6* 9.9*   HCT 28.8* 30.6*    195     CMP:   Recent Labs   Lab 06/21/25  1339 06/21/25  1942 06/22/25  0733   * 133* 133*   K 4.3 4.8 3.9   CL 94* 96 98   CO2 23 24 25   * 170* 184*   BUN 43* 39* 33*   CREATININE 1.6* 1.5* 1.2   CALCIUM 8.9 9.0 9.1   ANIONGAP 13 13 10     Microbiology Results (last 7 days)       Procedure Component Value Units Date/Time    Culture, Anaerobe [[id number]]     Order Status: Sent Specimen: Skin     Aerobic culture [[id number]]     Order Status: Sent Specimen: Skin             Significant Imaging: I have reviewed all  pertinent imaging results/findings within the past 24 hours.

## 2025-06-22 NOTE — ASSESSMENT & PLAN NOTE
Patient evaluated by Podiatry. Although x-ray failed to show bony destruction, examination by Podiatry showed evidence of exposed bone consistent with clinical osteomyelitis.   Continue antibiotics.  Pending amputation with Podiatry.

## 2025-06-22 NOTE — ASSESSMENT & PLAN NOTE
Likely source for septic shock on presentation.  -podiatry seeing pt, plan for OR 6/22 . Appreciate recs   -cont rocephin, ID following, appreciate recs  -cont holding diuretics

## 2025-06-22 NOTE — ASSESSMENT & PLAN NOTE
"Assessment:  69 year old male with diabetic ulcer to the right hallux. Marked leukocytosis and E coli bacteremia. Radiographs concerning for "soft tissue swelling and scattered air in the soft tissues about the great toe more so medially concerning for infection". Bedside I&D performed. Grossly infected right hallux with wound probing to bone and communicating with open ulceration under the nail plate. Copious purulent drainage expressed bedside. Plan for partial 1st ray amputation 6/22/25.     Plan:   - Bedside I&D of the right hallux performed, procedure note to follow.  - Right hallux flushed with vashe and dressed with aquacellAg, gauze, cast padding, and ACE bandage.  - Patient to be NPO at midnight, plan for OR tomorrow 6/22/25 for right partial 1st ray amputation. Patient is consented and marked.  - Abx per ID  - Podiatry will follow. Please reach out with any questions.   "

## 2025-06-22 NOTE — HPI
"Mr. Rocco France is a 69 year old male with a PMHx of type 2 diabetes, hypertension, stage D CHF due to NICM underwent HM3 implantation 1/13/2021 with sternal closure 1/14/2021. He was admitted for syncope 1/27/2022 at which time he was found to have an evolving right cerebral acute subdural hematoma and acute basal cistern subarachnoid hemorrhage. Patient presented to outside hospital emergency room with generalized weakness.  Patient is a poor historian. He reports falling 3 times at home and has had weakness over the past week. EMS called, noted sugar greater than 500.  Upon arrival, oral temperature is 101.1° F. patient complaining of dysuria, inability to "hold his urine". Patient states he saw a doctor today, unable to give a urine sample, but had his INR drawn which was greater than 4.5. He denies chest pain or shortness of breath.  He does endorse a cough and hiccups. Denies taking any medication for fever prior to arrival.  He denies nausea vomiting or diarrhea, no abdominal pain. Podiatry consulted for "R toe wound, suspect bactermia source". Patient denies any trauma or pain to the right first digit noting that he became aware of the wound to the toe a few days ago.         "

## 2025-06-22 NOTE — ANESTHESIA POSTPROCEDURE EVALUATION
Anesthesia Post Evaluation    Patient: Rocco France    Procedure(s) Performed: Procedure(s) (LRB):  AMPUTATION, TOE, THROUGH METATARSAL HEAD (Right)    Final Anesthesia Type: MAC      Patient location during evaluation: PACU  Patient participation: Yes- Able to Participate  Level of consciousness: awake and alert  Post-procedure vital signs: reviewed and stable  Pain management: adequate  Airway patency: patent    PONV status at discharge: No PONV  Anesthetic complications: no      Cardiovascular status: blood pressure returned to baseline  Respiratory status: face mask  Hydration status: euvolemic  Follow-up not needed.              Vitals Value Taken Time   /64 06/22/25 15:47   Temp 36.3 °C (97.3 °F) 06/22/25 15:15   Pulse 50 06/22/25 15:49   Resp 24 06/22/25 15:49   SpO2 99% 06/22/25 15:47   Vitals shown include unfiled device data.      Event Time   Out of Recovery 06/22/2025 15:15:00         Pain/Santos Score: Santos Score: 9 (6/22/2025  3:15 PM)

## 2025-06-22 NOTE — SUBJECTIVE & OBJECTIVE
Interval History: pt with continued improved/stable mentation this AM. Oriented x 4. GNR in blood cultures supporting initial presentation with septic shock. Source of infection likely R toes. Completed toe x ray and podiatry c/s yesterday evening.  Pt going to OR today for partial amputation. Cont ceftriaxone per ID         Continuous Infusions:  Scheduled Meds:   amLODIPine  10 mg Oral Daily    atorvastatin  80 mg Oral Daily    cefTRIAXone (Rocephin) IV (PEDS and ADULTS)  2 g Intravenous Q24H    insulin aspart U-100  8 Units Subcutaneous TIDWM    insulin glargine U-100  24 Units Subcutaneous Daily    mirtazapine  30 mg Oral QHS    mupirocin   Nasal BID    warfarin  5 mg Oral Daily     PRN Meds:  Current Facility-Administered Medications:     dextrose 50%, 12.5 g, Intravenous, PRN    dextrose 50%, 12.5 g, Intravenous, PRN    dextrose 50%, 25 g, Intravenous, PRN    fentaNYL, 25 mcg, Intravenous, Q5 Min PRN    glucagon (human recombinant), 1 mg, Intramuscular, PRN    glucagon (human recombinant), 1 mg, Intramuscular, PRN    glucose, 16 g, Oral, PRN    glucose, 24 g, Oral, PRN    insulin aspart U-100, 0-5 Units, Subcutaneous, QID (AC + HS) PRN    ondansetron, 4 mg, Intravenous, Daily PRN    Review of patient's allergies indicates:   Allergen Reactions    Pcn [penicillins] Hives     Objective:     Vital Signs (Most Recent):  Temp: 97.3 °F (36.3 °C) (06/22/25 1515)  Pulse: 78 (06/22/25 1530)  Resp: 20 (06/22/25 1530)  BP: 95/64 (06/22/25 1530)  SpO2: 95 % (06/22/25 1530) Vital Signs (24h Range):  Temp:  [97.3 °F (36.3 °C)-98.3 °F (36.8 °C)] 97.3 °F (36.3 °C)  Pulse:  [75-97] 78  Resp:  [11-20] 20  SpO2:  [94 %-99 %] 95 %  BP: (76-95)/(0-66) 95/64     Patient Vitals for the past 72 hrs (Last 3 readings):   Weight   06/21/25 0530 72.2 kg (159 lb 2.8 oz)   06/20/25 1510 74.3 kg (163 lb 12.8 oz)   06/20/25 0315 74.3 kg (163 lb 12.8 oz)     Body mass index is 20.44 kg/m².      Intake/Output Summary (Last 24 hours) at  6/22/2025 1536  Last data filed at 6/22/2025 1437  Gross per 24 hour   Intake 1022 ml   Output 1680 ml   Net -658 ml       Hemodynamic Parameters:       Telemetry: reviewed       Physical Exam  Vitals reviewed.   Constitutional:       Appearance: He is normal weight.   HENT:      Head: Normocephalic and atraumatic.      Right Ear: External ear normal.      Left Ear: External ear normal.      Nose: Nose normal.      Mouth/Throat:      Mouth: Mucous membranes are moist.      Pharynx: Oropharynx is clear.   Eyes:      Conjunctiva/sclera: Conjunctivae normal.   Cardiovascular:      Rate and Rhythm: Normal rate and regular rhythm.      Pulses: Normal pulses.   Pulmonary:      Effort: Pulmonary effort is normal.      Breath sounds: Normal breath sounds.   Abdominal:      General: Abdomen is flat. Bowel sounds are normal.      Palpations: Abdomen is soft.   Musculoskeletal:      Cervical back: Normal range of motion.      Right lower leg: No edema.      Left lower leg: No edema.   Skin:     General: Skin is warm.      Capillary Refill: Capillary refill takes less than 2 seconds.   Neurological:      Mental Status: He is alert. Mental status is at baseline.   Psychiatric:         Mood and Affect: Mood normal.         Behavior: Behavior normal.            Significant Labs:  CBC:  Recent Labs   Lab 06/20/25  0959 06/21/25  0529 06/22/25  0733   WBC 16.65* 14.42* 12.23   RBC 3.88* 3.33* 3.48*   HGB 11.2* 9.6* 9.9*   HCT 34.1* 28.8* 30.6*    183 195   MCV 88 87 88   MCH 28.9 28.8 28.4   MCHC 32.8 33.3 32.4     BNP:  Recent Labs   Lab 06/19/25  1751 06/20/25  0959   BNP 3,655* 615*     CMP:  Recent Labs   Lab 06/19/25  1751 06/20/25  0931 06/20/25  0959 06/20/25  1404 06/21/25  1339 06/21/25  1942 06/22/25  0733   *   < >  --    < > 314* 170* 184*   CALCIUM 9.1   < >  --    < > 8.9 9.0 9.1   ALBUMIN 2.6*  --  2.6*  --   --   --   --    PROT 9.6*  --  9.5*  --   --   --   --    *   < >  --    < > 130* 133*  133*   K 4.4   < >  --    < > 4.3 4.8 3.9   CO2 25   < >  --    < > 23 24 25   CL 87*   < >  --    < > 94* 96 98   BUN 32*   < >  --    < > 43* 39* 33*   CREATININE 1.9*   < >  --    < > 1.6* 1.5* 1.2   ALKPHOS 197*  --  216*  --   --   --   --    ALT 9*  --  19  --   --   --   --    AST 32  --  42  --   --   --   --    BILITOT 2.6*  --  2.7*  --   --   --   --     < > = values in this interval not displayed.      Coagulation:   Recent Labs   Lab 06/20/25  0959 06/21/25  0529 06/22/25  0733   INR 1.8* 1.6* 1.5*   APTT 38.6* 36.5* 34.3*     LDH:  Recent Labs   Lab 06/20/25  0959 06/21/25  0529 06/22/25  0733   * 217 203     Microbiology:  Microbiology Results (last 7 days)       Procedure Component Value Units Date/Time    Culture, Anaerobic [[id number]] Collected: 06/22/25 1450    Order Status: Sent Specimen: Bone from Toe, Right Foot     Fungus culture [[id number]] Collected: 06/22/25 1450    Order Status: Sent Specimen: Bone from Toe, Right Foot     Gram stain [[id number]] Collected: 06/22/25 1450    Order Status: Sent Specimen: Bone from Toe, Right Foot     Aerobic culture [[id number]] Collected: 06/22/25 1450    Order Status: Sent Specimen: Bone from Toe, Right Foot     AFB Culture & Smear [[id number]] Collected: 06/22/25 1450    Order Status: Sent Specimen: Bone from Toe, Right Foot     Culture, Anaerobe [[id number]]     Order Status: Sent Specimen: Skin     Aerobic culture [[id number]]     Order Status: Sent Specimen: Skin             I have reviewed all pertinent labs within the past 24 hours.    Estimated Creatinine Clearance: 59.3 mL/min (based on SCr of 1.2 mg/dL).    Diagnostic Results:  I have reviewed and interpreted all pertinent imaging results/findings within the past 24 hours.

## 2025-06-22 NOTE — ANESTHESIA PREPROCEDURE EVALUATION
[facility]  Anesthesia Pre-Operative Evaluation         Patient Name: Rocco Frnace  YOB: 1955  MRN: [id number]    SUBJECTIVE:     Pre-operative evaluation for Procedure(s) (LRB):  AMPUTATION, FOOT, TRANSMETATARSAL (Right)     06/22/2025    Rocco France is a 69 y.o. male w/ a significant PMHx of   type 2 diabetes, hypertension,stage D CHF due to NICM underwent HM3 implantation 1/13/2021 with sternal closure 1/14/2021.  He was admitted for syncope 1/27/2022 at which time he was found to have an evolving right cerebral acute subdural hematoma and acute basal cistern subarachnoid hemorrhage.    Patient now presents for the above procedure(s).      LDA:   Peripheral IV 06/20/25 0424 22 G Anterior;Left;Proximal Forearm (Active)   Site Assessment Clean;Intact;Dry;No redness 06/21/25 2000   Line Securement Device Secured with sutureless device 06/21/25 0800   Extremity Assessment Distal to IV No abnormal discoloration 06/21/25 1905   Dressing Status Clean;Dry;Intact 06/21/25 1905   Dressing Intervention Integrity maintained 06/21/25 1905   Dressing Change Due 06/24/25 06/21/25 1905   Site Change Due 06/24/25 06/21/25 0800   Reason Not Rotated Not due 06/21/25 1905   Number of days: 2            VAD 01/13/21 1211 Left ventricular assist device HeartMate 3 (Active)   Site Location Abdomen right 06/21/25 1905   Site Assessment Other (Comment) 06/21/25 1905   Driveline Exit Site 1 06/20/25 0600   Driveline Assessment Free of Kinks;Intact;Modular cable Clean and Locked 06/21/25 1905   Dressing Status Clean;Dry;Intact 06/21/25 1905   Dressing Intervention Integrity maintained 06/21/25 1905   Performed By RN 06/21/25 1905   Dressing Change Schedule Every 3 days 06/21/25 1905   Dressing Change Due 06/23/25 06/21/25 1905   Integrity dry;intact 06/21/25 1905   Driveline [place] in use Duarte johnson 06/21/25 1905   Condition CDI 06/21/25 1905   Date changed 06/20/25 06/21/25 1905   Number of  days: 1620       Prev airway: None documented.    Drips: None documented.      Problem List[1]    Review of patient's allergies indicates:   Allergen Reactions    Pcn [penicillins] Hives       Current Inpatient Medications:   amLODIPine  10 mg Oral Daily    atorvastatin  80 mg Oral Daily    cefTRIAXone (Rocephin) IV (PEDS and ADULTS)  2 g Intravenous Q24H    insulin aspart U-100  6 Units Subcutaneous TIDWM    insulin glargine U-100  20 Units Subcutaneous Daily    mirtazapine  30 mg Oral QHS    mupirocin   Nasal BID    warfarin  2.5 mg Oral Daily       Medications Ordered Prior to Encounter[2]    Past Surgical History:   Procedure Laterality Date    CARDIAC CATHETERIZATION  2010    no stents    CARDIAC DEFIBRILLATOR PLACEMENT  2010    CARDIAC DEFIBRILLATOR PLACEMENT      HERNIA REPAIR      IRRIGATION OF MEDIASTINUM N/A 1/14/2021    Procedure: IRRIGATION, MEDIASTINUM;  Surgeon: Zenon Corona MD;  Location: [facility] OR [address] [place] [address];  Service: Cardiovascular;  Laterality: N/A;    KNEE ARTHROSCOPY Right     LEFT VENTRICULAR ASSIST DEVICE Left 1/13/2021    Procedure: INSERTION- HeartMate 3 LEFT VENTRICULAR ASSIST DEVICE ;  Surgeon: Zenon Corona MD;  Location: [address];  Service: Cardiovascular;  Laterality: Left;    RECONSTRUCTION OF PERICARDIUM N/A 1/14/2021    Procedure: RECONSTRUCTION, PERICARDIUM;  Surgeon: Zenon Corona MD;  Location: [facility] OR [address] [place] [address];  Service: Cardiovascular;  Laterality: N/A;    RIGHT HEART CATHETERIZATION Right 11/2/2020    Procedure: INSERTION, CATHETER, RIGHT HEART;  Surgeon: Deana Lake MD;  Location: [facility] CATH LAB;  Service: Cardiology;  Laterality: Right;    RIGHT HEART CATHETERIZATION Right 3/24/2022    Procedure: INSERTION, CATHETER, RIGHT HEART;  Surgeon: Manuel Ramos Jr., MD;  Location: [facility] CATH LAB;  Service: Cardiology;  Laterality: Right;    STERNAL WOUND CLOSURE  1/13/2021    Procedure: TEMPORARY CLOSURE OF CHEST;  Surgeon: Zenon Corona MD;  Location: [address];   Service: Cardiovascular;;    STERNAL WOUND CLOSURE N/A 1/14/2021    Procedure: CLOSURE, WOUND, STERNUM;  Surgeon: Zenon Corona MD;  Location: [facility] OR [address] [place] [address];  Service: Cardiovascular;  Laterality: N/A;       Social History[3]    OBJECTIVE:     Vital Signs Range (Last 24H):  Temp:  [36.4 °C (97.6 °F)-36.8 °C (98.2 °F)]   Pulse:  []   Resp:  [16-20]   BP: (76-90)/(0)   SpO2:  [97 %-98 %]       Significant Labs:  Lab Results   Component Value Date    WBC 14.42 (H) 06/21/2025    HGB 9.6 (L) 06/21/2025    HCT 28.8 (L) 06/21/2025     06/21/2025    CHOL 98 (L) 01/09/2025    TRIG 65 01/09/2025    HDL 44 01/09/2025    ALT 19 06/20/2025    AST 42 06/20/2025     (L) 06/21/2025    K 4.8 06/21/2025    CL 96 06/21/2025    CREATININE 1.5 (H) 06/21/2025    BUN 39 (H) 06/21/2025    CO2 24 06/21/2025    TSH 0.906 05/18/2023    PSA 0.47 11/16/2020    INR 1.6 (H) 06/21/2025    HGBA1C 12.4 (H) 06/20/2025       Diagnostic Studies: No relevant studies.    EKG:   Results for orders placed or performed during the hospital encounter of 05/18/23   EKG 12-LEAD    Collection Time: 05/18/23  6:46 PM    Narrative    Test Reason : Z95.811,    Vent. Rate : 092 BPM     Atrial Rate : 092 BPM     P-R Int : 000 ms          QRS Dur : 180 ms      QT Int : 502 ms       P-R-T Axes : 000 050 084 degrees     QTc Int : 620 ms    ARTIFACT REPEAT EKG  Confirmed by Erica Elder MD ([id number]) on 5/19/2023 11:34:43 AM    Referred By: CARMEN RASHID           Confirmed By:Erica Elder MD       2D ECHO:  TTE:  Results for orders placed or performed during the hospital encounter of 06/12/24   Echo   Result Value Ref Range    BSA 2.01 m2    LVIDd 7.1 (A) 3.5 - 6.0 cm    LV Systolic Volume 207.38 mL    LV Systolic Volume Index 103.2 mL/m2    LVIDs 6.38 (A) 2.1 - 4.0 cm    LV Diastolic Volume 239.38 mL    LV Diastolic Volume Index 119.09 mL/m2    IVS 0.62 0.6 - 1.1 cm    LVOT diameter 2.48 cm    LVOT area 4.8 cm2    FS 10 (A) 28 - 44 %    Left  Ventricle Relative Wall Thickness 0.21 cm    PW 0.74 0.6 - 1.1 cm    LV mass 206.59 g    LV Mass Index 103 g/m2    MV Peak E Dimitris 0.71 m/s    TDI LATERAL 0.11 m/s    TDI SEPTAL 0.06 m/s    E/E' ratio 8.35 m/s    TR Max Dimitris 2.24 m/s    LV SEPTAL E/E' RATIO 11.83 m/s    KENDRA 48.8 mL/m2    LV LATERAL E/E' RATIO 6.45 m/s    LA Vol 98.12 cm3    RVDD 5.03 cm    TAPSE 1.05 cm    RV/LV Ratio 0.71 cm    LA size 4.8 cm    Left Atrium Minor Axis 5.0 cm    Left Atrium Major Axis 5.0 cm    LA Vol (MOD) 116.06 cm3    LA WIDTH 4.81 cm    KENDRA (MOD) 57.7 mL/m2    RA Major Axis 5.09 cm    RA Width 5.12 cm    Triscuspid Valve Regurgitation Peak Gradient 20 mmHg    Sinus 3.87 cm    STJ 3.22 cm    Ascending aorta 3.79 cm    Mean e' 0.09 m/s    ZLVIDS 4.49     ZLVIDD 1.87     LVAD  Rate 5,200 rpm    TV resting pulmonary artery pressure 35 mmHg    RV TB RVSP 17 mmHg    Est. RA pres 15 mmHg    Narrative      Left Ventricle: Global hypokinesis present. There is severely reduced   systolic function with a visually estimated ejection fraction of 10 -15%.    Right Ventricle: Moderate right ventricular enlargement. Wall thickness   is normal. Right ventricle wall motion has global hypokinesis. Systolic   function is mildly reduced. Pacemaker lead present in the ventricle.    Left Atrium: Left atrium is severely dilated.    Right Atrium: Right atrium is severely dilated.    Mitral Valve: There is mild to moderate regurgitation with a centrally   directed jet.    Tricuspid Valve: There is moderate regurgitation.    Pulmonic Valve: There is mild regurgitation.    Aorta: Aortic root is mildly dilated measuring 3.87 cm. Ascending aorta   is mildly dilated measuring 3.79 cm.    Pulmonary Artery: The estimated pulmonary artery systolic pressure is   35 mmHg.    IVC/SVC: Elevated venous pressure at 15 mmHg.    LVAD: There is a Heartmate III LVAD running at 5,200 RPM. The aortic   valve partially opens with each cardiac cycle. The ventricular septum is    at midline. Inflow cannula not well visualized. Outflow graft not   visualized.         THERESA:  No results found. However, due to the size of the patient record, not all encounters were searched. Please check Results Review for a complete set of results.    ASSESSMENT/PLAN:                                                                                                            06/22/2025  Rocco France is a 69 y.o., male.      Pre-op Assessment    I have reviewed the Patient Summary Reports.     I have reviewed the Nursing Notes.    I have reviewed the Medications.     Review of Systems  Anesthesia Hx:  No problems with previous Anesthesia   History of prior surgery of interest to airway management or planning:          Denies Family Hx of Anesthesia complications.    Denies Personal Hx of Anesthesia complications.                    Social:  Smoker       Hematology/Oncology:  Hematology Normal   Oncology Normal                                   EENT/Dental:  EENT/Dental Normal           Cardiovascular:     Hypertension   CAD   CABG/stent    CHF (EF 25%)    hyperlipidemia    S/p LVAD 1/2021  Arrhythmia   pulm HTN     Coronary Artery Disease:                            Hypertension     Atrial Fibrillation     Pulmonary:    Denies COPD.      Denies Sleep Apnea.                Renal/:  Chronic Renal Disease, ARF renal calculi       Kidney Function/Disease             Hepatic/GI:      Denies GERD.                Musculoskeletal:  Denies Arthritis.               Neurological:  Neurology Normal                                      Endocrine:  Diabetes    Diabetes                      Psych:  Psychiatric History                  Physical Exam  General: Cooperative, Confusion and Well nourished        Anesthesia Plan  Type of Anesthesia, risks & benefits discussed:    Anesthesia Type: MAC  Intra-op Monitoring Plan: Standard ASA Monitors and Art Line  Post Op Pain Control Plan: multimodal analgesia  Induction:   IV  Informed Consent: Informed consent signed with the Patient representative and all parties understand the risks and agree with anesthesia plan.  All questions answered.   ASA Score: 4  Day of Surgery Review of History & Physical: H&P completed by Anesthesiologist.    Ready For Surgery From Anesthesia Perspective.     .           [1]   Patient Active Problem List  Diagnosis    ICD (implantable cardioverter-defibrillator) in place    NICM (nonischemic cardiomyopathy)    Leg pain, left    Essential hypertension    Type 2 diabetes mellitus    Hyperlipidemia    Acute on chronic combined systolic and diastolic heart failure    CAD (coronary artery disease)    Counseling regarding advanced care planning and goals of care    LVAD (left ventricular assist device) present    Anticoagulated    Bilateral subdural hematomas    PSVT (paroxysmal supraventricular tachycardia)    Subarachnoid hemorrhage    Dizziness    Diplopia    Atrial fibrillation    Iron deficiency anemia due to chronic blood loss    Subdural hematoma, nontraumatic    Normocytic anemia    Muscle weakness of right upper extremity    Tobacco dependency    Shock    Moderate malnutrition    E coli bacteremia    Diabetic ulcer of right foot   [2]   No current facility-administered medications on file prior to encounter.     Current Outpatient Medications on File Prior to Encounter   Medication Sig Dispense Refill    amLODIPine (NORVASC) 5 MG tablet Take 2 tablets (10 mg total) by mouth once daily. 60 tablet 11    atorvastatin (LIPITOR) 80 MG tablet Take 1 tablet (80 mg total) by mouth once daily. 90 tablet 3    digoxin (LANOXIN) 125 mcg tablet Take 1 tablet (0.125 mg total) by mouth once daily. 30 tablet 11    docusate sodium (COLACE) 100 MG capsule Take 100 mg by mouth Daily.      ferrous gluconate (FERGON) 324 MG tablet Take 1 tablet by mouth every morning.      magnesium oxide (MAG-OX) 400 mg (241.3 mg magnesium) tablet TAKE 1 TABLET BY MOUTH THREE TIMES DAILY  90 tablet 11    mirtazapine (REMERON) 30 MG tablet Take 1 tablet by mouth in the evening 30 tablet 0    pantoprazole (PROTONIX) 40 MG tablet Take 1 tablet (40 mg total) by mouth once daily. (Patient taking differently: Take 40 mg by mouth once daily. Take in Morning on empty stomach 20 min prior to other medications) 90 tablet 3    potassium chloride SA (K-DUR,KLOR-CON) 20 MEQ tablet Take 1 tablet (20 mEq total) by mouth 2 (two) times daily. 60 tablet 5    solifenacin (VESICARE) 5 MG tablet Take 5 mg by mouth once daily.      spironolactone (ALDACTONE) 25 MG tablet Take 1 tablet (25 mg total) by mouth once daily. 30 tablet 11    torsemide (DEMADEX) 20 MG Tab Take 2 tablets (40 mg total) by mouth 2 (two) times a day. 120 tablet 11    warfarin (COUMADIN) 5 MG tablet Take 0.5-1 tablets (2.5-5 mg total) by mouth Daily. As directed by the [facility] 35 tablet 10   [3]   Social History  Socioeconomic History    Marital status:    Tobacco Use    Smoking status: Some Days     Types: Cigarettes    Smokeless tobacco: Never   Substance and Sexual Activity    Alcohol use: No     Social Drivers of Health     Financial Resource Strain: Patient Declined (6/20/2025)    Overall Financial Resource Strain (CARDIA)     Difficulty of Paying Living Expenses: Patient declined   Food Insecurity: Patient Declined (6/20/2025)    Hunger Vital Sign     Worried About Running Out of Food in the Last Year: Patient declined     Ran Out of Food in the Last Year: Patient declined   Transportation Needs: Patient Declined (6/20/2025)    PRAPARE - Transportation     Lack of Transportation (Medical): Patient declined     Lack of Transportation (Non-Medical): Patient declined   Stress: Patient Declined (6/20/2025)    [employer] [place] [employer] - Occupational Stress Questionnaire     Feeling of Stress : Patient declined   Housing Stability: Patient Declined (6/20/2025)    Housing Stability Vital Sign     Unable to Pay for Housing  in the Last Year: Patient declined     Homeless in the Last Year: Patient declined

## 2025-06-22 NOTE — SUBJECTIVE & OBJECTIVE
Scheduled Meds:   amLODIPine  10 mg Oral Daily    atorvastatin  80 mg Oral Daily    cefTRIAXone (Rocephin) IV (PEDS and ADULTS)  2 g Intravenous Q24H    insulin aspart U-100  6 Units Subcutaneous TIDWM    [START ON 6/22/2025] insulin glargine U-100  20 Units Subcutaneous Daily    mirtazapine  30 mg Oral QHS    mupirocin   Nasal BID    warfarin  2.5 mg Oral Daily     Continuous Infusions:  PRN Meds:  Current Facility-Administered Medications:     chlorproMAZINE, 25 mg, Oral, TID PRN    dextrose 50%, 12.5 g, Intravenous, PRN    dextrose 50%, 25 g, Intravenous, PRN    glucagon (human recombinant), 1 mg, Intramuscular, PRN    glucose, 16 g, Oral, PRN    glucose, 24 g, Oral, PRN    insulin aspart U-100, 0-5 Units, Subcutaneous, QID (AC + HS) PRN    Review of patient's allergies indicates:   Allergen Reactions    Pcn [penicillins] Hives        Past Medical History:   Diagnosis Date    Acute respiratory failure requiring reintubation 1/28/2022    CLARISSE (acute kidney injury) 1/11/2021    Diabetes mellitus     Kidney stones      Past Surgical History:   Procedure Laterality Date    CARDIAC CATHETERIZATION  2010    no stents    CARDIAC DEFIBRILLATOR PLACEMENT  2010    CARDIAC DEFIBRILLATOR PLACEMENT      HERNIA REPAIR      IRRIGATION OF MEDIASTINUM N/A 1/14/2021    Procedure: IRRIGATION, MEDIASTINUM;  Surgeon: Zenon Corona MD;  Location: [facility] OR [address] [place] [address];  Service: Cardiovascular;  Laterality: N/A;    KNEE ARTHROSCOPY Right     LEFT VENTRICULAR ASSIST DEVICE Left 1/13/2021    Procedure: INSERTION- HeartMate 3 LEFT VENTRICULAR ASSIST DEVICE ;  Surgeon: Zenon Corona MD;  Location: [address];  Service: Cardiovascular;  Laterality: Left;    RECONSTRUCTION OF PERICARDIUM N/A 1/14/2021    Procedure: RECONSTRUCTION, PERICARDIUM;  Surgeon: Zenon Corona MD;  Location: [facility] OR [address] [place] [address];  Service: Cardiovascular;  Laterality: N/A;    RIGHT HEART CATHETERIZATION Right 11/2/2020    Procedure: INSERTION, CATHETER, RIGHT HEART;   Surgeon: Deana Lake MD;  Location: [facility] CATH LAB;  Service: Cardiology;  Laterality: Right;    RIGHT HEART CATHETERIZATION Right 3/24/2022    Procedure: INSERTION, CATHETER, RIGHT HEART;  Surgeon: Manuel Ramos Jr., MD;  Location: [facility] CATH LAB;  Service: Cardiology;  Laterality: Right;    STERNAL WOUND CLOSURE  1/13/2021    Procedure: TEMPORARY CLOSURE OF CHEST;  Surgeon: Zenon Corona MD;  [facility];  Service: Cardiovascular;;    STERNAL WOUND CLOSURE N/A 1/14/2021    Procedure: CLOSURE, WOUND, STERNUM;  Surgeon: Zenon Corona MD;  [facility];  Service: Cardiovascular;  Laterality: N/A;       Family History       Problem Relation (Age of Onset)    Heart attack Mother, Brother    Heart failure Brother    Hypertension Brother          Tobacco Use    Smoking status: Some Days     Types: Cigarettes    Smokeless tobacco: Never   Substance and Sexual Activity    Alcohol use: No    Drug use: Not on file    Sexual activity: Not on file     Review of Systems   Constitutional:  Negative for chills, diaphoresis, fatigue and fever.   HENT: Negative.     Eyes: Negative.    Respiratory: Negative.     Cardiovascular:  Negative for chest pain and leg swelling.   Gastrointestinal:  Negative for abdominal pain, diarrhea, nausea and vomiting.   Endocrine: Negative.    Genitourinary: Negative.    Musculoskeletal: Negative.    Skin:  Positive for wound.     Objective:     Vital Signs (Most Recent):  Temp: 98.2 °F (36.8 °C) (06/21/25 1905)  Pulse: 93 (06/21/25 1800)  Resp: 20 (06/21/25 1905)  BP: (!) 84/0 (06/21/25 1905)  SpO2: 97 % (06/21/25 1905) Vital Signs (24h Range):  Temp:  [97.6 °F (36.4 °C)-98.7 °F (37.1 °C)] 98.2 °F (36.8 °C)  Pulse:  [] 93  Resp:  [16-20] 20  SpO2:  [97 %-98 %] 97 %  BP: (76-90)/(0) 84/0     Weight: 72.2 kg (159 lb 2.8 oz)  Body mass index is 20.44 kg/m².    Foot Exam    General  Orientation: alert and oriented to person, place, and time   Affect: appropriate        Right Foot/Ankle     Comments  Dry skin and hammer toes 2-4 noted. Plantar wound fluctuant and probes to bone. Purulent drainage noted. Wound tracks to nail bed which has detached from the plate with purulent drainage noted under the nail. Punctate ulcerations noted at the base of the nail also with purulent drainage. Foot is warm to the touch and hallux is markedly edematous with blistering skin at the medial side of the hallux. Wound is significantly malodorous, but patient is non-tender to palpation.     Left Foot/Ankle      Neurovascular  Dorsalis pedis: 1+  Posterior tibial: 1+  Saphenous nerve sensation: diminished  Tibial nerve sensation: diminished  Superficial peroneal nerve sensation: diminished  Deep peroneal nerve sensation: diminished  Sural nerve sensation: diminished    Comments  Dry skin and hammer toes 2-4 noted. No wounds appreciated.                  Laboratory:  A1C:   Recent Labs   Lab 06/20/25  0959   HGBA1C 12.4*     CBC:   Recent Labs   Lab 06/21/25  0529   WBC 14.42*   RBC 3.33*   HGB 9.6*   HCT 28.8*      MCV 87   MCH 28.8   MCHC 33.3     CMP:   Recent Labs   Lab 06/20/25  0959 06/20/25  1404 06/21/25  1339   GLU  --    < > 314*   CALCIUM  --    < > 8.9   ALBUMIN 2.6*  --   --    PROT 9.5*  --   --    NA  --    < > 130*   K  --    < > 4.3   CO2  --    < > 23   CL  --    < > 94*   BUN  --    < > 43*   CREATININE  --    < > 1.6*   ALKPHOS 216*  --   --    ALT 19  --   --    AST 42  --   --    BILITOT 2.7*  --   --     < > = values in this interval not displayed.     Microbiology Results (last 7 days)       Procedure Component Value Units Date/Time    Culture, Anaerobe [[id number]]     Order Status: Sent Specimen: Skin     Aerobic culture [[id number]]     Order Status: Sent Specimen: Skin           All pertinent labs reviewed within the last 24 hours.    Diagnostic Results:  I have reviewed all pertinent imaging results/findings within the past 24 hours.  X-Ray Toe 2 or More Views  Right  Narrative: EXAMINATION:  XR TOE 2 OR MORE VIEWS RIGHT    CLINICAL HISTORY:  eval for wound infection;    TECHNIQUE:  Three views of the right toes were performed    COMPARISON:  None    FINDINGS:  There is significant soft tissue swelling about the great toe.  There is scattered air about the proximal phalanx concerning for infection.  Narrowing of the IP joint of the great toe.  Vascular calcifications noted.  No convincing bony destruction.  Radiographic findings of osteomyelitis are often delayed.  Impression: Soft tissue swelling and scattered air in the soft tissues about the great toe more so medially concerning for infection.  Correlation with clinical findings and additional evaluation will be needed.    This report was flagged in Epic as abnormal.    Electronically signed by: Sami Paul MD  Date:    06/21/2025  Time:    14:52

## 2025-06-22 NOTE — ASSESSMENT & PLAN NOTE
I have reviewed hospital notes from  [facility] service and other specialty providers. I have also reviewed CBC, CMP/BMP,  cultures and imaging with my interpretation as documented.     E coli bacteremia likely from a diabetic toe wound. Blood cultures with E coli.   Continue ceftriaxone 2 gm daily.  Will repeat blood cultures tomorrow in AM.   Discussed with patent and wife at bedside.  Discussed management plan with the staff and/or members from [facility] service.

## 2025-06-22 NOTE — NURSING
Patient family member Laura called this RN for updates. I asked patient and he authorized me to update her over the phone. Plan of care discussed with possibility of suurgery in the morning also diabetic teaching provided. Reassured he is stable at the moment and we are monitoring his glucose. Patient family member to call patient afterwards.

## 2025-06-22 NOTE — BRIEF OP NOTE
[facility] - Surgery ([facility])  Brief Operative Note    SUMMARY     Surgery Date: 6/22/2025     Surgeons and Role:     * Latasha Elise DPM - Primary    Assisting Surgeon: Kimo Gregory DPM- Resident     Pre-op Diagnosis:  Diabetic ulcer of toe of right foot associated with type 2 diabetes mellitus, with necrosis of bone [E11.621, L97.514]    Post-op Diagnosis:  Post-Op Diagnosis Codes:     * Diabetic ulcer of toe of right foot associated with type 2 diabetes mellitus, with necrosis of bone [E11.621, L97.514]    Procedure(s) (LRB):  AMPUTATION, TOE, THROUGH METATARSAL HEAD (Right)    Anesthesia: * No anesthesia type entered *    Implants:  * No implants in log *    Operative Findings: Right partial 1st ray amputation. Notable purulent drainage observed upon incision along with gas bubbles. Closed with 3-0 Monocryl and 3-0 Nylon.     Estimated Blood Loss: * No values recorded between 6/22/2025  1:44 PM and 6/22/2025  3:12 PM *         Specimens:   Specimen (24h ago, onward)       Start     Ordered    06/22/25 1452  Specimen to Pathology Podiatry  RELEASE UPON ORDERING        References:    Click here for ordering Quick Tip   Question:  Release to patient  Answer:  Immediate    06/22/25 1452                  ID Type Source Tests Collected by Time Destination   1 : 1. partial 1st ray amputation right foot Tissue Toe, Right Foot SPECIMEN TO PATHOLOGY Latasha Elise DPM 6/22/2025 1433    2 : 2. Clean margins right foot 1st metatarsal Bone Toe, Right Foot SPECIMEN TO PATHOLOGY Latasha Elise DPM 6/22/2025 1448    A : bone 1st metatarsal Bone Toe, Right Foot CULTURE, ANAEROBIC, CULTURE, FUNGUS, GRAM STAIN, CULTURE, AEROBIC  (SPECIFY SOURCE), AFB CULTURE & SMEAR Latasha Elise DPM 6/22/2025 1450        [id number]

## 2025-06-22 NOTE — TRANSFER OF CARE
"Anesthesia Transfer of Care Note    Patient: Rocco France    Procedure(s) Performed: Procedure(s) (LRB):  AMPUTATION, TOE, THROUGH METATARSAL HEAD (Right)    Patient location: PACU    Anesthesia Type: MAC    Transport from OR: Transported from OR on 6-10 L/min O2 by face mask with adequate spontaneous ventilation    Post pain: adequate analgesia    Post assessment: no apparent anesthetic complications    Post vital signs: stable    Level of consciousness: alert and awake    Nausea/Vomiting: no nausea/vomiting    Complications: none    Transfer of care protocol was followed      Last vitals: Visit Vitals  BP (!) 79/0 (BP Location: Left arm, Patient Position: Lying)   Pulse 85   Temp 36.6 °C (97.8 °F) (Temporal)   Resp 16   Ht 6' 2" (1.88 m)   Wt 72.2 kg (159 lb 2.8 oz)   SpO2 99%   BMI 20.44 kg/m²     "

## 2025-06-22 NOTE — ASSESSMENT & PLAN NOTE
Procedure: Device Interrogation Including analysis of device parameters  Current Settings: Ventricular Assist Device  Review of device function is stable/unstable stable        6/22/2025    11:44 AM 6/22/2025     7:32 AM 6/22/2025     5:15 AM 6/22/2025    12:15 AM 6/21/2025     7:05 PM 6/21/2025     3:54 PM 6/21/2025    11:30 AM   TXP LVAD INTERROGATIONS   Type HeartMate3 HeartMate3 HeartMate3 HeartMate3 HeartMate3 HeartMate3 HeartMate3   Flow 4.5 4.3 4.5 4.6 4.6 4.5 4.6   Speed 5200 5200 5200 5200 5250 5200 5200   PI 6.1 5.7 5.8 6.2 5.4 5.7 5.5   Power (Edwards) 3.6 3.6 3.6 3.6 3.6 3.6 3.6   LSL 4800 4800 4800 4800 4800 4800 4800   Pulsatility Intermittent pulse Intermittent pulse Intermittent pulse Intermittent pulse Intermittent pulse Intermittent pulse Intermittent pulse

## 2025-06-22 NOTE — CARE UPDATE
BG goal 140-180    -A1C   Lab Results   Component Value Date    HGBA1C 12.4 (H) 06/20/2025         -HOME REGIMEN: none     -INPATIENT REGIMEN: Lantus 20 units once daily, Novolog 6 units TID with meals and SSI    -GLUCOSE TREND FOR THE PAST 24HRS: 189-236    -NO HYPOGYCEMIAS NOTED     -Diet: Diet NPO    -Steroids - n/a    -Tube Feeds - n/a      Remains in CICU. HM3. Plan for OR on 6/22 for right partial first toe amputation. BG at and above goal on current SQ insulin regimen.      Plan:  Increase Lantus to 24 units once daily (20% dose increase; fasting BG above goal ranges)   Increase Novolog  to 8 units TID with meals (basal/prandial match) (HOLD while NPO)   Low Dose Correction Scale  BG monitoring ac/hs     Discharge planning:tbd    Endocrine to continue to follow    ** Please call Endocrine for any BG related issues **

## 2025-06-22 NOTE — PROCEDURES
Incision and Drainage    Date/Time: 6/21/2025 8:03 PM  Location procedure was performed: [facility]    Performed by: Kimo Gregory MD  Authorized by: Latasha Elise DPM  Type: abscess  Body area: lower extremity  Location details: right big toe  Anesthesia: local infiltration    Anesthesia:  Local Anesthetic: bupivacaine 0.25% without epinephrine  Anesthetic total: 10 mL  Incision type: single straight  Complexity: simple  Drainage: purulent and bloody  Drainage amount: copious  Wound treatment: wound left open  Complications: No  Estimated blood loss (mL): 2  Patient tolerance: Patient tolerated the procedure well with no immediate complications  Comments: Bedside incision and drainage performed. Local block performed on the right hallux. Single straight incision made to the medial side of the hallux with copious purulent drainage. Patient tolerated the procedure well.

## 2025-06-22 NOTE — PROGRESS NOTES
[facility] - [facility] ([facility])  Heart Transplant  Progress Note    Patient Name: Rocco France  MRN: [id number]  Admission Date: 6/20/2025  Hospital Length of Stay: 2 days  Attending Physician: Pete Garnett, *  Primary Care Provider: Jay Guardado DO  Principal Problem:Type 2 diabetes mellitus    Subjective:   Interval History: pt with continued improved/stable mentation this AM. Oriented x 4. GNR in blood cultures supporting initial presentation with septic shock. Source of infection likely R toes. Completed toe x ray and podiatry c/s yesterday evening.  Pt going to OR today for partial amputation. Cont ceftriaxone per ID         Continuous Infusions:  Scheduled Meds:   amLODIPine  10 mg Oral Daily    atorvastatin  80 mg Oral Daily    cefTRIAXone (Rocephin) IV (PEDS and ADULTS)  2 g Intravenous Q24H    insulin aspart U-100  8 Units Subcutaneous TIDWM    insulin glargine U-100  24 Units Subcutaneous Daily    mirtazapine  30 mg Oral QHS    mupirocin   Nasal BID    warfarin  5 mg Oral Daily     PRN Meds:  Current Facility-Administered Medications:     dextrose 50%, 12.5 g, Intravenous, PRN    dextrose 50%, 12.5 g, Intravenous, PRN    dextrose 50%, 25 g, Intravenous, PRN    fentaNYL, 25 mcg, Intravenous, Q5 Min PRN    glucagon (human recombinant), 1 mg, Intramuscular, PRN    glucagon (human recombinant), 1 mg, Intramuscular, PRN    glucose, 16 g, Oral, PRN    glucose, 24 g, Oral, PRN    insulin aspart U-100, 0-5 Units, Subcutaneous, QID (AC + HS) PRN    ondansetron, 4 mg, Intravenous, Daily PRN    Review of patient's allergies indicates:   Allergen Reactions    Pcn [penicillins] Hives     Objective:     Vital Signs (Most Recent):  Temp: 97.3 °F (36.3 °C) (06/22/25 1515)  Pulse: 78 (06/22/25 1530)  Resp: 20 (06/22/25 1530)  BP: 95/64 (06/22/25 1530)  SpO2: 95 % (06/22/25 1530) Vital Signs (24h Range):  Temp:  [97.3 °F (36.3 °C)-98.3 °F (36.8 °C)] 97.3 °F (36.3 °C)  Pulse:  [75-97] 78  Resp:  [11-20] 20  SpO2:   [94 %-99 %] 95 %  BP: (76-95)/(0-66) 95/64     Patient Vitals for the past 72 hrs (Last 3 readings):   Weight   06/21/25 0530 72.2 kg (159 lb 2.8 oz)   06/20/25 1510 74.3 kg (163 lb 12.8 oz)   06/20/25 0315 74.3 kg (163 lb 12.8 oz)     Body mass index is 20.44 kg/m².      Intake/Output Summary (Last 24 hours) at 6/22/2025 1536  Last data filed at 6/22/2025 1437  Gross per 24 hour   Intake 1022 ml   Output 1680 ml   Net -658 ml       Hemodynamic Parameters:       Telemetry: reviewed       Physical Exam  Vitals reviewed.   Constitutional:       Appearance: He is normal weight.   HENT:      Head: Normocephalic and atraumatic.      Right Ear: External ear normal.      Left Ear: External ear normal.      Nose: Nose normal.      Mouth/Throat:      Mouth: Mucous membranes are moist.      Pharynx: Oropharynx is clear.   Eyes:      Conjunctiva/sclera: Conjunctivae normal.   Cardiovascular:      Rate and Rhythm: Normal rate and regular rhythm.      Pulses: Normal pulses.   Pulmonary:      Effort: Pulmonary effort is normal.      Breath sounds: Normal breath sounds.   Abdominal:      General: Abdomen is flat. Bowel sounds are normal.      Palpations: Abdomen is soft.   Musculoskeletal:      Cervical back: Normal range of motion.      Right lower leg: No edema.      Left lower leg: No edema.   Skin:     General: Skin is warm.      Capillary Refill: Capillary refill takes less than 2 seconds.   Neurological:      Mental Status: He is alert. Mental status is at baseline.   Psychiatric:         Mood and Affect: Mood normal.         Behavior: Behavior normal.            Significant Labs:  CBC:  Recent Labs   Lab 06/20/25  0959 06/21/25  0529 06/22/25  0733   WBC 16.65* 14.42* 12.23   RBC 3.88* 3.33* 3.48*   HGB 11.2* 9.6* 9.9*   HCT 34.1* 28.8* 30.6*    183 195   MCV 88 87 88   MCH 28.9 28.8 28.4   MCHC 32.8 33.3 32.4     BNP:  Recent Labs   Lab 06/19/25  1751 06/20/25  0959   BNP 3,655* 615*     CMP:  Recent Labs   Lab  06/19/25  1751 06/20/25  0931 06/20/25  0959 06/20/25  1404 06/21/25  1339 06/21/25  1942 06/22/25  0733   *   < >  --    < > 314* 170* 184*   CALCIUM 9.1   < >  --    < > 8.9 9.0 9.1   ALBUMIN 2.6*  --  2.6*  --   --   --   --    PROT 9.6*  --  9.5*  --   --   --   --    *   < >  --    < > 130* 133* 133*   K 4.4   < >  --    < > 4.3 4.8 3.9   CO2 25   < >  --    < > 23 24 25   CL 87*   < >  --    < > 94* 96 98   BUN 32*   < >  --    < > 43* 39* 33*   CREATININE 1.9*   < >  --    < > 1.6* 1.5* 1.2   ALKPHOS 197*  --  216*  --   --   --   --    ALT 9*  --  19  --   --   --   --    AST 32  --  42  --   --   --   --    BILITOT 2.6*  --  2.7*  --   --   --   --     < > = values in this interval not displayed.      Coagulation:   Recent Labs   Lab 06/20/25  0959 06/21/25  0529 06/22/25  0733   INR 1.8* 1.6* 1.5*   APTT 38.6* 36.5* 34.3*     LDH:  Recent Labs   Lab 06/20/25  0959 06/21/25  0529 06/22/25  0733   * 217 203     Microbiology:  Microbiology Results (last 7 days)       Procedure Component Value Units Date/Time    Culture, Anaerobic [[id number]] Collected: 06/22/25 1450    Order Status: Sent Specimen: Bone from Toe, Right Foot     Fungus culture [[id number]] Collected: 06/22/25 1450    Order Status: Sent Specimen: Bone from Toe, Right Foot     Gram stain [[id number]] Collected: 06/22/25 1450    Order Status: Sent Specimen: Bone from Toe, Right Foot     Aerobic culture [[id number]] Collected: 06/22/25 1450    Order Status: Sent Specimen: Bone from Toe, Right Foot     AFB Culture & Smear [[id number]] Collected: 06/22/25 1450    Order Status: Sent Specimen: Bone from Toe, Right Foot     Culture, Anaerobe [[id number]]     Order Status: Sent Specimen: Skin     Aerobic culture [[id number]]     Order Status: Sent Specimen: Skin             I have reviewed all pertinent labs within the past 24 hours.    Estimated Creatinine Clearance: 59.3 mL/min (based on SCr of 1.2 mg/dL).    Diagnostic Results:  I  have reviewed and interpreted all pertinent imaging results/findings within the past 24 hours.  Assessment and Plan:     No notes on file    * Type 2 diabetes mellitus  Pt presenting with hyperglycemia of nearly 600. Pt dry on exam on AM eval, overnight initiated on diuresis with IVP, discontinued this morning.   D/w Endocrine, given downtrending glucose levels, will initiate scheduled insulin.   -po intake per SLP recommendations - diabetic diet ordered   -endocrine following, appreciate recs.     LVAD (left ventricular assist device) present  Procedure: Device Interrogation Including analysis of device parameters  Current Settings: Ventricular Assist Device  Review of device function is stable/unstable stable        6/22/2025    11:44 AM 6/22/2025     7:32 AM 6/22/2025     5:15 AM 6/22/2025    12:15 AM 6/21/2025     7:05 PM 6/21/2025     3:54 PM 6/21/2025    11:30 AM   TXP LVAD INTERROGATIONS   Type HeartMate3 HeartMate3 HeartMate3 HeartMate3 HeartMate3 HeartMate3 HeartMate3   Flow 4.5 4.3 4.5 4.6 4.6 4.5 4.6   Speed 5200 5200 5200 5200 5250 5200 5200   PI 6.1 5.7 5.8 6.2 5.4 5.7 5.5   Power (Edwards) 3.6 3.6 3.6 3.6 3.6 3.6 3.6   LSL 4800 4800 4800 4800 4800 4800 4800   Pulsatility Intermittent pulse Intermittent pulse Intermittent pulse Intermittent pulse Intermittent pulse Intermittent pulse Intermittent pulse         Anticoagulated  -cont warfarin per INR   -pharmacy assisting with dosing     Acute osteomyelitis of toe, right  Likely source for septic shock on presentation.  -podiatry seeing pt, plan for OR 6/22 . Appreciate recs   -cont rocephin, ID following, appreciate recs  -cont holding diuretics     E coli bacteremia  -cont rocephin  -ID following, appreciate recs  -R toe osteomyelitis is most likely source     Shock  Pt initially presented with elevated lactic and elevated glucose, elevated serum osm but not quite to level of HHS. DKA workup negative. No pH for eval completed. Septic shock with source  most likely R foot.     -endocrine following, appreciate recs  -stop diuresis, cont to hold   -monitor renal function   -podiatry c/s, appreciate recs, OR 6/22 for partial amputation           Blanca Whitten MD  Heart Transplant  [facility] - Surgery (2nd Fl)

## 2025-06-22 NOTE — PROGRESS NOTES
[facility]  [facility]  [facility] Note    Patient Name: Rocco France  MRN: [id number]  Admission Date: 6/20/2025  Length of Stay: 2 days  Attending Physician: Pete Garnett, *  Primary Care Provider: Jay Guardado DO    Isolation Status: No active isolations  Assessment/Plan:      ID  Acute osteomyelitis of toe, right  Patient evaluated by Podiatry. Although x-ray failed to show bony destruction, examination by Podiatry showed evidence of exposed bone consistent with clinical osteomyelitis.   Continue antibiotics.  Pending amputation with Podiatry.     E coli bacteremia  I have reviewed hospital notes from  [facility] service and other specialty providers. I have also reviewed CBC, CMP/BMP,  cultures and imaging with my interpretation as documented.     E coli bacteremia likely from a diabetic toe wound. Blood cultures with E coli.   Continue ceftriaxone 2 gm daily.  Will repeat blood cultures tomorrow in AM.   Discussed with patent and wife at bedside.  Discussed management plan with the staff and/or members from [facility] service.          Anticipated Disposition: per primary    Thank you for your consult. I will follow-up with patient. Please contact us if you have any additional questions.    Tali Tinsley MD  Infectious Disease  [facility] - Cardiology Stepdown    50 minutes of total time spent on the encounter, which includes face to face time and non-face to face time preparing to see the patient (eg, review of tests), obtaining and/or reviewing separately obtained history, documenting clinical information in the electronic or other health record, independently interpreting results (not separately reported) and communicating results to the patient/family/caregiver, or care coordination (not separately reported).     Subjective:     Principal Problem:Type 2 diabetes mellitus    HPI: A 69-year-old man with NICM, CAD, combined HF, Afib, s/p ICD, s/p LVAD on 1/13/21, DM2, subdural  "hematoma with subarachnoid hemorrhage in 2022 who presented to a local ED with generalized weakness. This was associated with three falls. EMS services found the patient to be hyperglycemic with blood sugar level noted to be over 500. On evaluation in the ED he was noted to be febrile to 101.1 F. This was associated with dysuria and hesitancy.  He was admitted to [facility] and started on cefepime plus vancomycin. Admission blood cultures on 6/19 are now positive for GNR identified as E coli on BCID.      Infection History:  October 4, 2021- DLI due to P aeruginosa, empiric Cipro plus doxycycline.  Interval History: "OK". Pending toe amputation.    Review of Systems   Constitutional:  Negative for chills, fatigue and fever.   HENT:  Negative for ear pain, mouth sores, nosebleeds, postnasal drip, rhinorrhea, sinus pressure, sore throat, tinnitus, trouble swallowing and voice change.    Eyes:  Negative for photophobia, pain, redness and visual disturbance.   Respiratory:  Negative for apnea, cough, chest tightness, shortness of breath and wheezing.    Cardiovascular:  Negative for chest pain, palpitations and leg swelling.   Gastrointestinal:  Negative for abdominal pain, blood in stool, constipation, diarrhea, nausea and vomiting.   Endocrine: Negative for cold intolerance, heat intolerance, polydipsia and polyuria.   Genitourinary:  Negative for decreased urine volume, difficulty urinating, dysuria, flank pain, frequency, genital sores, hematuria, penile discharge, penile pain, penile swelling, scrotal swelling, testicular pain and urgency.   Musculoskeletal:  Negative for arthralgias, back pain, joint swelling, myalgias and neck pain.   Skin:  Positive for wound. Negative for color change and rash.   Allergic/Immunologic: Negative for environmental allergies and food allergies.   Neurological:  Negative for dizziness, seizures, syncope, weakness, light-headedness, numbness and headaches.   Hematological:  Negative for " adenopathy. Does not bruise/bleed easily.   Psychiatric/Behavioral:  Negative for agitation, confusion, decreased concentration, hallucinations, self-injury, sleep disturbance and suicidal ideas. The patient is not nervous/anxious.      Objective:     Vital Signs (Most Recent):  Temp: 97.8 °F (36.6 °C) (06/22/25 1130)  Pulse: 85 (06/22/25 1218)  Resp: 16 (06/22/25 1130)  BP: (!) 79/0 (06/22/25 1130)  SpO2: 99 % (06/22/25 1130) Vital Signs (24h Range):  Temp:  [97.8 °F (36.6 °C)-98.3 °F (36.8 °C)] 97.8 °F (36.6 °C)  Pulse:  [76-97] 85  Resp:  [16-20] 16  SpO2:  [97 %-99 %] 99 %  BP: (76-84)/(0) 79/0     Weight: 72.2 kg (159 lb 2.8 oz)  Body mass index is 20.44 kg/m².    Estimated Creatinine Clearance: 59.3 mL/min (based on SCr of 1.2 mg/dL).     Physical Exam  Vitals and nursing note reviewed. Exam conducted with a chaperone present.   Constitutional:       Appearance: Normal appearance.   HENT:      Head: Normocephalic and atraumatic.   Eyes:      General: No scleral icterus.        Right eye: No discharge.         Left eye: No discharge.      Conjunctiva/sclera: Conjunctivae normal.   Cardiovascular:      Rate and Rhythm: Normal rate and regular rhythm.      Pulses: Normal pulses.      Heart sounds: No murmur heard.     Comments: VAD Humming sounds  Pulmonary:      Effort: Pulmonary effort is normal. No respiratory distress.      Breath sounds: Normal breath sounds. No stridor. No wheezing or rhonchi.   Abdominal:      General: There is no distension.      Palpations: Abdomen is soft.      Tenderness: There is no abdominal tenderness.      Comments: DLES covered. No tenderness to palpation.   Musculoskeletal:      Comments: Right foot covered with compression bandage.   Skin:     General: Skin is warm and dry.   Neurological:      General: No focal deficit present.      Mental Status: He is alert and oriented to person, place, and time.          Significant Labs: CBC:   Recent Labs   Lab 06/21/25  0529  06/22/25  0733   WBC 14.42* 12.23   HGB 9.6* 9.9*   HCT 28.8* 30.6*    195     CMP:   Recent Labs   Lab 06/21/25  1339 06/21/25  1942 06/22/25  0733   * 133* 133*   K 4.3 4.8 3.9   CL 94* 96 98   CO2 23 24 25   * 170* 184*   BUN 43* 39* 33*   CREATININE 1.6* 1.5* 1.2   CALCIUM 8.9 9.0 9.1   ANIONGAP 13 13 10     Microbiology Results (last 7 days)       Procedure Component Value Units Date/Time    Culture, Anaerobe [[id number]]     Order Status: Sent Specimen: Skin     Aerobic culture [[id number]]     Order Status: Sent Specimen: Skin             Significant Imaging: I have reviewed all pertinent imaging results/findings within the past 24 hours.

## 2025-06-22 NOTE — NURSING
Transport here to bring pt to OR. Patient connected to batteries, LVAD emergency bag with patient.

## 2025-06-22 NOTE — NURSING
VAD coordinator on call, Zaida RN, paged regarding patient missing 2 battery clips; typically bring 4 clips on admit. Battery clips not on admit inventory flowsheet. Patient going for R toe amputation. OR charge nurse, Ramirez, notified of perfusionist needed for surgery; perfusionist on site. Per Zaida, call her immediately if something happens to one of the patient's current battery clips. Emergency bag with 2 extra batteries and extra controller with patient.

## 2025-06-22 NOTE — PROGRESS NOTES
06/22/2025  Vanessa Ramires    Current provider:  Pete Garnett, *    Device interrogation:      6/22/2025    11:44 AM 6/22/2025     7:32 AM 6/22/2025     5:15 AM 6/22/2025    12:15 AM 6/21/2025     7:05 PM 6/21/2025     3:54 PM 6/21/2025    11:30 AM   TXP LVAD INTERROGATIONS   Type HeartMate3 HeartMate3 HeartMate3 HeartMate3 HeartMate3 HeartMate3 HeartMate3   Flow 4.5 4.3 4.5 4.6 4.6 4.5 4.6   Speed 5200 5200 5200 5200 5250 5200 5200   PI 6.1 5.7 5.8 6.2 5.4 5.7 5.5   Power (Edwards) 3.6 3.6 3.6 3.6 3.6 3.6 3.6   LSL 4800 4800 4800 4800 4800 4800 4800   Pulsatility Intermittent pulse Intermittent pulse Intermittent pulse Intermittent pulse Intermittent pulse Intermittent pulse Intermittent pulse          Rounded on Rocco France to ensure all mechanical assist device settings (IABP or VAD) were appropriate and all parameters were within limits.  I was able to ensure all back up equipment was present, the staff had no issues, and the Perfusion Department daily rounding was complete.      For implantable VADs: Interrogation of Ventricular assist device was performed with analysis of device parameters and review of device function. I have personally reviewed the interrogation findings and agree with findings as stated.     In emergency, the nursing units have been notified to contact the perfusion department either by:  Calling [phone] from 630am to 4pm Mon thru Fri, utilizing the On-Call Finder functionality of Epic and searching for Perfusion, or by contacting the hospital  from 4pm to 630am and on weekends and asking to speak with the perfusionist on call.    2:13 PM

## 2025-06-22 NOTE — ASSESSMENT & PLAN NOTE
Pt initially presented with elevated lactic and elevated glucose, elevated serum osm but not quite to level of HHS. DKA workup negative. No pH for eval completed. Septic shock with source most likely R foot.     -endocrine following, appreciate recs  -stop diuresis, cont to hold   -monitor renal function   -podiatry c/s, appreciate recs, OR 6/22 for partial amputation

## 2025-06-22 NOTE — CONSULTS
"[facility]  Podiatry  Consult Note    Patient Name: Rocco France  MRN: [id number]  Admission Date: 6/20/2025  Hospital Length of Stay: 1 days  Attending Physician: Pete Garnett, *  Primary Care Provider: Jay Guardado DO     Inpatient consult to Podiatry  Consult performed by: Kimo Gregory MD  Consult ordered by: Blanca Whitten MD        Subjective:     History of Present Illness:  Mr. Rocco France is a 69 year old male with a PMHx of type 2 diabetes, hypertension, stage D CHF due to NICM underwent HM3 implantation 1/13/2021 with sternal closure 1/14/2021. He was admitted for syncope 1/27/2022 at which time he was found to have an evolving right cerebral acute subdural hematoma and acute basal cistern subarachnoid hemorrhage. Patient presented to outside hospital emergency room with generalized weakness.  Patient is a poor historian. He reports falling 3 times at home and has had weakness over the past week. EMS called, noted sugar greater than 500.  Upon arrival, oral temperature is 101.1° F. patient complaining of dysuria, inability to "hold his urine". Patient states he saw a doctor today, unable to give a urine sample, but had his INR drawn which was greater than 4.5. He denies chest pain or shortness of breath.  He does endorse a cough and hiccups. Denies taking any medication for fever prior to arrival.  He denies nausea vomiting or diarrhea, no abdominal pain. Podiatry consulted for "R toe wound, suspect bactermia source". Patient denies any trauma or pain to the right first digit noting that he became aware of the wound to the toe a few days ago.           Scheduled Meds:   amLODIPine  10 mg Oral Daily    atorvastatin  80 mg Oral Daily    cefTRIAXone (Rocephin) IV (PEDS and ADULTS)  2 g Intravenous Q24H    insulin aspart U-100  6 Units Subcutaneous TIDWM    [START ON 6/22/2025] insulin glargine U-100  20 Units Subcutaneous Daily    mirtazapine  30 mg Oral QHS    " mupirocin   Nasal BID    warfarin  2.5 mg Oral Daily     Continuous Infusions:  PRN Meds:  Current Facility-Administered Medications:     chlorproMAZINE, 25 mg, Oral, TID PRN    dextrose 50%, 12.5 g, Intravenous, PRN    dextrose 50%, 25 g, Intravenous, PRN    glucagon (human recombinant), 1 mg, Intramuscular, PRN    glucose, 16 g, Oral, PRN    glucose, 24 g, Oral, PRN    insulin aspart U-100, 0-5 Units, Subcutaneous, QID (AC + HS) PRN    Review of patient's allergies indicates:   Allergen Reactions    Pcn [penicillins] Hives        Past Medical History:   Diagnosis Date    Acute respiratory failure requiring reintubation 1/28/2022    CLARISSE (acute kidney injury) 1/11/2021    Diabetes mellitus     Kidney stones      Past Surgical History:   Procedure Laterality Date    CARDIAC CATHETERIZATION  2010    no stents    CARDIAC DEFIBRILLATOR PLACEMENT  2010    CARDIAC DEFIBRILLATOR PLACEMENT      HERNIA REPAIR      IRRIGATION OF MEDIASTINUM N/A 1/14/2021    Procedure: IRRIGATION, MEDIASTINUM;  Surgeon: Zenon Corona MD;  Location: [facility] OR [address] [place] [address];  Service: Cardiovascular;  Laterality: N/A;    KNEE ARTHROSCOPY Right     LEFT VENTRICULAR ASSIST DEVICE Left 1/13/2021    Procedure: INSERTION- HeartMate 3 LEFT VENTRICULAR ASSIST DEVICE ;  Surgeon: Zenon Corona MD;  [facility];  Service: Cardiovascular;  Laterality: Left;    RECONSTRUCTION OF PERICARDIUM N/A 1/14/2021    Procedure: RECONSTRUCTION, PERICARDIUM;  Surgeon: Zenon Corona MD;  [facility];  Service: Cardiovascular;  Laterality: N/A;    RIGHT HEART CATHETERIZATION Right 11/2/2020    Procedure: INSERTION, CATHETER, RIGHT HEART;  Surgeon: Deana Lake MD;  Location: [facility] CATH LAB;  Service: Cardiology;  Laterality: Right;    RIGHT HEART CATHETERIZATION Right 3/24/2022    Procedure: INSERTION, CATHETER, RIGHT HEART;  Surgeon: Manuel Ramos Jr., MD;  Location: [facility] CATH LAB;  Service: Cardiology;  Laterality: Right;    STERNAL WOUND  CLOSURE  1/13/2021    Procedure: TEMPORARY CLOSURE OF CHEST;  Surgeon: Zenon Corona MD;  [facility];  Service: Cardiovascular;;    STERNAL WOUND CLOSURE N/A 1/14/2021    Procedure: CLOSURE, WOUND, STERNUM;  Surgeon: Zenon Cornoa MD;  Location: [facility] OR 2ND FLR;  Service: Cardiovascular;  Laterality: N/A;       Family History       Problem Relation (Age of Onset)    Heart attack Mother, Brother    Heart failure Brother    Hypertension Brother          Tobacco Use    Smoking status: Some Days     Types: Cigarettes    Smokeless tobacco: Never   Substance and Sexual Activity    Alcohol use: No    Drug use: Not on file    Sexual activity: Not on file     Review of Systems   Constitutional:  Negative for chills, diaphoresis, fatigue and fever.   HENT: Negative.     Eyes: Negative.    Respiratory: Negative.     Cardiovascular:  Negative for chest pain and leg swelling.   Gastrointestinal:  Negative for abdominal pain, diarrhea, nausea and vomiting.   Endocrine: Negative.    Genitourinary: Negative.    Musculoskeletal: Negative.    Skin:  Positive for wound.     Objective:     Vital Signs (Most Recent):  Temp: 98.2 °F (36.8 °C) (06/21/25 1905)  Pulse: 93 (06/21/25 1800)  Resp: 20 (06/21/25 1905)  BP: (!) 84/0 (06/21/25 1905)  SpO2: 97 % (06/21/25 1905) Vital Signs (24h Range):  Temp:  [97.6 °F (36.4 °C)-98.7 °F (37.1 °C)] 98.2 °F (36.8 °C)  Pulse:  [] 93  Resp:  [16-20] 20  SpO2:  [97 %-98 %] 97 %  BP: (76-90)/(0) 84/0     Weight: 72.2 kg (159 lb 2.8 oz)  Body mass index is 20.44 kg/m².    Foot Exam    General  Orientation: alert and oriented to person, place, and time   Affect: appropriate       Right Foot/Ankle     Comments  Dry skin and hammer toes 2-4 noted. Plantar wound fluctuant and probes to bone. Purulent drainage noted. Wound tracks to nail bed which has detached from the plate with purulent drainage noted under the nail. Punctate ulcerations noted at the base of the nail also with purulent  drainage. Foot is warm to the touch and hallux is markedly edematous with blistering skin at the medial side of the hallux. Wound is significantly malodorous, but patient is non-tender to palpation.     Left Foot/Ankle      Neurovascular  Dorsalis pedis: 1+  Posterior tibial: 1+  Saphenous nerve sensation: diminished  Tibial nerve sensation: diminished  Superficial peroneal nerve sensation: diminished  Deep peroneal nerve sensation: diminished  Sural nerve sensation: diminished    Comments  Dry skin and hammer toes 2-4 noted. No wounds appreciated.                  Laboratory:  A1C:   Recent Labs   Lab 06/20/25  0959   HGBA1C 12.4*     CBC:   Recent Labs   Lab 06/21/25  0529   WBC 14.42*   RBC 3.33*   HGB 9.6*   HCT 28.8*      MCV 87   MCH 28.8   MCHC 33.3     CMP:   Recent Labs   Lab 06/20/25  0959 06/20/25  1404 06/21/25  1339   GLU  --    < > 314*   CALCIUM  --    < > 8.9   ALBUMIN 2.6*  --   --    PROT 9.5*  --   --    NA  --    < > 130*   K  --    < > 4.3   CO2  --    < > 23   CL  --    < > 94*   BUN  --    < > 43*   CREATININE  --    < > 1.6*   ALKPHOS 216*  --   --    ALT 19  --   --    AST 42  --   --    BILITOT 2.7*  --   --     < > = values in this interval not displayed.     Microbiology Results (last 7 days)       Procedure Component Value Units Date/Time    Culture, Anaerobe [[id number]]     Order Status: Sent Specimen: Skin     Aerobic culture [[id number]]     Order Status: Sent Specimen: Skin           All pertinent labs reviewed within the last 24 hours.    Diagnostic Results:  I have reviewed all pertinent imaging results/findings within the past 24 hours.  X-Ray Toe 2 or More Views Right  Narrative: EXAMINATION:  XR TOE 2 OR MORE VIEWS RIGHT    CLINICAL HISTORY:  eval for wound infection;    TECHNIQUE:  Three views of the right toes were performed    COMPARISON:  None    FINDINGS:  There is significant soft tissue swelling about the great toe.  There is scattered air about the proximal  "phalanx concerning for infection.  Narrowing of the IP joint of the great toe.  Vascular calcifications noted.  No convincing bony destruction.  Radiographic findings of osteomyelitis are often delayed.  Impression: Soft tissue swelling and scattered air in the soft tissues about the great toe more so medially concerning for infection.  Correlation with clinical findings and additional evaluation will be needed.    This report was flagged in Epic as abnormal.    Electronically signed by: Sami Paul MD  Date:    06/21/2025  Time:    14:52  Assessment/Plan:     Endocrine  Diabetic ulcer of right foot  Assessment:  69 year old male with diabetic ulcer to the right hallux. Marked leukocytosis and E coli bacteremia. Radiographs concerning for "soft tissue swelling and scattered air in the soft tissues about the great toe more so medially concerning for infection". Bedside I&D performed. Grossly infected right hallux with wound probing to bone and communicating with open ulceration under the nail plate. Copious purulent drainage expressed bedside. Plan for partial 1st ray amputation 6/22/25.     Plan:   - Bedside I&D of the right hallux performed, procedure note to follow.  - Right hallux flushed with vashe and dressed with aquacellAg, gauze, cast padding, and ACE bandage.  - Patient to be NPO at midnight, plan for OR tomorrow 6/22/25 for right partial 1st ray amputation. Patient is consented and marked.  - Abx per ID  - Podiatry will follow. Please reach out with any questions.         Thank you for your consult. I will follow-up with patient. Please contact us if you have any additional questions.    Kimo Gregory MD  Podiatry  [facility]  "

## 2025-06-23 LAB
ABSOLUTE EOSINOPHIL (OHS): 0.27 K/UL
ABSOLUTE MONOCYTE (OHS): 1.85 K/UL (ref 0.3–1)
ABSOLUTE NEUTROPHIL COUNT (OHS): 8.66 K/UL (ref 1.8–7.7)
ACID FAST MOD KINY STN SPEC: NORMAL
ALBUMIN SERPL BCP-MCNC: 2.2 G/DL (ref 3.5–5.2)
ALP SERPL-CCNC: 207 UNIT/L (ref 40–150)
ALT SERPL W/O P-5'-P-CCNC: 15 UNIT/L (ref 10–44)
ANION GAP (OHS): 10 MMOL/L (ref 8–16)
ANION GAP (OHS): 8 MMOL/L (ref 8–16)
APTT PPP: 34.7 SECONDS (ref 21–32)
AST SERPL-CCNC: 28 UNIT/L (ref 11–45)
BASOPHILS # BLD AUTO: 0.03 K/UL
BASOPHILS NFR BLD AUTO: 0.2 %
BILIRUB DIRECT SERPL-MCNC: 0.6 MG/DL (ref 0.1–0.3)
BILIRUB SERPL-MCNC: 1.1 MG/DL (ref 0.1–1)
BNP SERPL-MCNC: 270 PG/ML (ref 0–99)
BUN SERPL-MCNC: 28 MG/DL (ref 8–23)
BUN SERPL-MCNC: 29 MG/DL (ref 8–23)
CALCIUM SERPL-MCNC: 9 MG/DL (ref 8.7–10.5)
CALCIUM SERPL-MCNC: 9 MG/DL (ref 8.7–10.5)
CHLORIDE SERPL-SCNC: 98 MMOL/L (ref 95–110)
CHLORIDE SERPL-SCNC: 98 MMOL/L (ref 95–110)
CO2 SERPL-SCNC: 26 MMOL/L (ref 23–29)
CO2 SERPL-SCNC: 27 MMOL/L (ref 23–29)
CREAT SERPL-MCNC: 1.2 MG/DL (ref 0.5–1.4)
CREAT SERPL-MCNC: 1.3 MG/DL (ref 0.5–1.4)
CRP SERPL-MCNC: 201.8 MG/L
ERYTHROCYTE [DISTWIDTH] IN BLOOD BY AUTOMATED COUNT: 14.2 % (ref 11.5–14.5)
GFR SERPLBLD CREATININE-BSD FMLA CKD-EPI: 59 ML/MIN/1.73/M2
GFR SERPLBLD CREATININE-BSD FMLA CKD-EPI: >60 ML/MIN/1.73/M2
GLUCOSE SERPL-MCNC: 124 MG/DL (ref 70–110)
GLUCOSE SERPL-MCNC: 243 MG/DL (ref 70–110)
HCT VFR BLD AUTO: 31.8 % (ref 40–54)
HGB BLD-MCNC: 10.4 GM/DL (ref 14–18)
IMM GRANULOCYTES # BLD AUTO: 0.11 K/UL (ref 0–0.04)
IMM GRANULOCYTES NFR BLD AUTO: 0.9 % (ref 0–0.5)
INR PPP: 1.5 (ref 0.8–1.2)
LDH SERPL-CCNC: 204 U/L (ref 110–260)
LYMPHOCYTES # BLD AUTO: 1.72 K/UL (ref 1–4.8)
MAGNESIUM SERPL-MCNC: 2.1 MG/DL (ref 1.6–2.6)
MCH RBC QN AUTO: 29.2 PG (ref 27–31)
MCHC RBC AUTO-ENTMCNC: 32.7 G/DL (ref 32–36)
MCV RBC AUTO: 89 FL (ref 82–98)
NUCLEATED RBC (/100WBC) (OHS): 0 /100 WBC
OHS QRS DURATION: 180 MS
OHS QTC CALCULATION: 624 MS
PHOSPHATE SERPL-MCNC: 2.3 MG/DL (ref 2.7–4.5)
PLATELET # BLD AUTO: 220 K/UL (ref 150–450)
PMV BLD AUTO: 11.1 FL (ref 9.2–12.9)
POCT GLUCOSE: 108 MG/DL (ref 70–110)
POCT GLUCOSE: 132 MG/DL (ref 70–110)
POCT GLUCOSE: 148 MG/DL (ref 70–110)
POCT GLUCOSE: 157 MG/DL (ref 70–110)
POCT GLUCOSE: 192 MG/DL (ref 70–110)
POTASSIUM SERPL-SCNC: 4.2 MMOL/L (ref 3.5–5.1)
POTASSIUM SERPL-SCNC: 4.6 MMOL/L (ref 3.5–5.1)
PREALB SERPL-MCNC: 5 MG/DL (ref 20–43)
PROT SERPL-MCNC: 9 GM/DL (ref 6–8.4)
PROTHROMBIN TIME: 15.7 SECONDS (ref 9–12.5)
RBC # BLD AUTO: 3.56 M/UL (ref 4.6–6.2)
RELATIVE EOSINOPHIL (OHS): 2.1 %
RELATIVE LYMPHOCYTE (OHS): 13.6 % (ref 18–48)
RELATIVE MONOCYTE (OHS): 14.6 % (ref 4–15)
RELATIVE NEUTROPHIL (OHS): 68.6 % (ref 38–73)
SODIUM SERPL-SCNC: 133 MMOL/L (ref 136–145)
SODIUM SERPL-SCNC: 134 MMOL/L (ref 136–145)
WBC # BLD AUTO: 12.64 K/UL (ref 3.9–12.7)

## 2025-06-23 PROCEDURE — 87040 BLOOD CULTURE FOR BACTERIA: CPT | Performed by: INTERNAL MEDICINE

## 2025-06-23 PROCEDURE — 25000003 PHARM REV CODE 250: Performed by: INTERNAL MEDICINE

## 2025-06-23 PROCEDURE — 93010 ELECTROCARDIOGRAM REPORT: CPT | Mod: ,,, | Performed by: STUDENT IN AN ORGANIZED HEALTH CARE EDUCATION/TRAINING PROGRAM

## 2025-06-23 PROCEDURE — G0545 PR VISIT INHERENT TO INPT OR OBS CARE, INFECTIOUS DISEASE: HCPCS | Mod: ,,, | Performed by: INTERNAL MEDICINE

## 2025-06-23 PROCEDURE — 85025 COMPLETE CBC W/AUTO DIFF WBC: CPT | Performed by: STUDENT IN AN ORGANIZED HEALTH CARE EDUCATION/TRAINING PROGRAM

## 2025-06-23 PROCEDURE — 85610 PROTHROMBIN TIME: CPT | Performed by: STUDENT IN AN ORGANIZED HEALTH CARE EDUCATION/TRAINING PROGRAM

## 2025-06-23 PROCEDURE — 93750 INTERROGATION VAD IN PERSON: CPT | Mod: ,,, | Performed by: INTERNAL MEDICINE

## 2025-06-23 PROCEDURE — 83880 ASSAY OF NATRIURETIC PEPTIDE: CPT | Performed by: STUDENT IN AN ORGANIZED HEALTH CARE EDUCATION/TRAINING PROGRAM

## 2025-06-23 PROCEDURE — 27000248 HC VAD-ADDITIONAL DAY

## 2025-06-23 PROCEDURE — 84134 ASSAY OF PREALBUMIN: CPT | Performed by: STUDENT IN AN ORGANIZED HEALTH CARE EDUCATION/TRAINING PROGRAM

## 2025-06-23 PROCEDURE — 97164 PT RE-EVAL EST PLAN CARE: CPT

## 2025-06-23 PROCEDURE — 83615 LACTATE (LD) (LDH) ENZYME: CPT | Performed by: STUDENT IN AN ORGANIZED HEALTH CARE EDUCATION/TRAINING PROGRAM

## 2025-06-23 PROCEDURE — 82248 BILIRUBIN DIRECT: CPT | Performed by: STUDENT IN AN ORGANIZED HEALTH CARE EDUCATION/TRAINING PROGRAM

## 2025-06-23 PROCEDURE — 63600175 PHARM REV CODE 636 W HCPCS: Performed by: INTERNAL MEDICINE

## 2025-06-23 PROCEDURE — 83735 ASSAY OF MAGNESIUM: CPT | Performed by: STUDENT IN AN ORGANIZED HEALTH CARE EDUCATION/TRAINING PROGRAM

## 2025-06-23 PROCEDURE — 25000003 PHARM REV CODE 250: Performed by: BEHAVIOR TECHNICIAN

## 2025-06-23 PROCEDURE — 97535 SELF CARE MNGMENT TRAINING: CPT

## 2025-06-23 PROCEDURE — 84100 ASSAY OF PHOSPHORUS: CPT | Performed by: STUDENT IN AN ORGANIZED HEALTH CARE EDUCATION/TRAINING PROGRAM

## 2025-06-23 PROCEDURE — 93005 ELECTROCARDIOGRAM TRACING: CPT

## 2025-06-23 PROCEDURE — 97116 GAIT TRAINING THERAPY: CPT

## 2025-06-23 PROCEDURE — 92526 ORAL FUNCTION THERAPY: CPT

## 2025-06-23 PROCEDURE — 97168 OT RE-EVAL EST PLAN CARE: CPT

## 2025-06-23 PROCEDURE — 25000003 PHARM REV CODE 250: Performed by: STUDENT IN AN ORGANIZED HEALTH CARE EDUCATION/TRAINING PROGRAM

## 2025-06-23 PROCEDURE — 85730 THROMBOPLASTIN TIME PARTIAL: CPT | Performed by: STUDENT IN AN ORGANIZED HEALTH CARE EDUCATION/TRAINING PROGRAM

## 2025-06-23 PROCEDURE — 80053 COMPREHEN METABOLIC PANEL: CPT | Performed by: STUDENT IN AN ORGANIZED HEALTH CARE EDUCATION/TRAINING PROGRAM

## 2025-06-23 PROCEDURE — 36415 COLL VENOUS BLD VENIPUNCTURE: CPT | Performed by: STUDENT IN AN ORGANIZED HEALTH CARE EDUCATION/TRAINING PROGRAM

## 2025-06-23 PROCEDURE — 20600001 HC STEP DOWN PRIVATE ROOM

## 2025-06-23 PROCEDURE — 94761 N-INVAS EAR/PLS OXIMETRY MLT: CPT

## 2025-06-23 PROCEDURE — 99232 SBSQ HOSP IP/OBS MODERATE 35: CPT | Mod: ,,, | Performed by: BEHAVIOR TECHNICIAN

## 2025-06-23 PROCEDURE — 99233 SBSQ HOSP IP/OBS HIGH 50: CPT | Mod: ,,, | Performed by: INTERNAL MEDICINE

## 2025-06-23 PROCEDURE — 86140 C-REACTIVE PROTEIN: CPT | Performed by: STUDENT IN AN ORGANIZED HEALTH CARE EDUCATION/TRAINING PROGRAM

## 2025-06-23 PROCEDURE — 99232 SBSQ HOSP IP/OBS MODERATE 35: CPT | Mod: ,,,

## 2025-06-23 RX ORDER — IBUPROFEN 200 MG
16 TABLET ORAL
Status: DISCONTINUED | OUTPATIENT
Start: 2025-06-23 | End: 2025-06-24

## 2025-06-23 RX ORDER — WARFARIN 2.5 MG/1
2.5 TABLET ORAL
Status: DISCONTINUED | OUTPATIENT
Start: 2025-06-24 | End: 2025-06-30 | Stop reason: HOSPADM

## 2025-06-23 RX ORDER — BISACODYL 10 MG/1
10 SUPPOSITORY RECTAL DAILY PRN
Status: DISCONTINUED | OUTPATIENT
Start: 2025-06-23 | End: 2025-06-30 | Stop reason: HOSPADM

## 2025-06-23 RX ORDER — IBUPROFEN 200 MG
24 TABLET ORAL
Status: DISCONTINUED | OUTPATIENT
Start: 2025-06-23 | End: 2025-06-24

## 2025-06-23 RX ORDER — WARFARIN SODIUM 5 MG/1
5 TABLET ORAL
Status: DISCONTINUED | OUTPATIENT
Start: 2025-06-23 | End: 2025-06-30 | Stop reason: HOSPADM

## 2025-06-23 RX ORDER — GLUCAGON 1 MG
1 KIT INJECTION
Status: DISCONTINUED | OUTPATIENT
Start: 2025-06-23 | End: 2025-06-24

## 2025-06-23 RX ORDER — WARFARIN 2.5 MG/1
2.5 TABLET ORAL
Status: DISCONTINUED | OUTPATIENT
Start: 2025-06-23 | End: 2025-06-23

## 2025-06-23 RX ORDER — WARFARIN SODIUM 5 MG/1
5 TABLET ORAL
Status: DISCONTINUED | OUTPATIENT
Start: 2025-06-24 | End: 2025-06-23

## 2025-06-23 RX ADMIN — MUPIROCIN: 20 OINTMENT TOPICAL at 08:06

## 2025-06-23 RX ADMIN — CHLORPROMAZINE HYDROCHLORIDE 25 MG: 25 TABLET, FILM COATED ORAL at 11:06

## 2025-06-23 RX ADMIN — AMLODIPINE BESYLATE 10 MG: 10 TABLET ORAL at 08:06

## 2025-06-23 RX ADMIN — CEFTRIAXONE 2 G: 2 INJECTION, POWDER, FOR SOLUTION INTRAMUSCULAR; INTRAVENOUS at 05:06

## 2025-06-23 RX ADMIN — INSULIN ASPART 8 UNITS: 100 INJECTION, SOLUTION INTRAVENOUS; SUBCUTANEOUS at 04:06

## 2025-06-23 RX ADMIN — INSULIN ASPART 8 UNITS: 100 INJECTION, SOLUTION INTRAVENOUS; SUBCUTANEOUS at 08:06

## 2025-06-23 RX ADMIN — WARFARIN SODIUM 5 MG: 5 TABLET ORAL at 04:06

## 2025-06-23 RX ADMIN — MIRTAZAPINE 30 MG: 30 TABLET, FILM COATED ORAL at 08:06

## 2025-06-23 RX ADMIN — INSULIN GLARGINE 24 UNITS: 100 INJECTION, SOLUTION SUBCUTANEOUS at 08:06

## 2025-06-23 RX ADMIN — INSULIN ASPART 8 UNITS: 100 INJECTION, SOLUTION INTRAVENOUS; SUBCUTANEOUS at 11:06

## 2025-06-23 RX ADMIN — LACTULOSE 30 G: 20 SOLUTION ORAL at 08:06

## 2025-06-23 RX ADMIN — POLYETHYLENE GLYCOL 3350 17 G: 17 POWDER, FOR SOLUTION ORAL at 08:06

## 2025-06-23 RX ADMIN — ATORVASTATIN CALCIUM 80 MG: 40 TABLET, FILM COATED ORAL at 08:06

## 2025-06-23 NOTE — SUBJECTIVE & OBJECTIVE
Subjective:     Post-Op Info:  Procedure(s) (LRB):  AMPUTATION, TOE, THROUGH METATARSAL HEAD (Right)   1 Day Post-Op        Interval History: Partial toe amputation yesterday      Medications:  Continuous Infusions:  Scheduled Meds:   amLODIPine  10 mg Oral Daily    atorvastatin  80 mg Oral Daily    cefTRIAXone (Rocephin) IV (PEDS and ADULTS)  2 g Intravenous Q24H    insulin aspart U-100  8 Units Subcutaneous TIDWM    insulin glargine U-100  24 Units Subcutaneous Daily    mirtazapine  30 mg Oral QHS    mupirocin   Nasal BID    polyethylene glycol  17 g Oral Daily    [START ON 6/24/2025] warfarin  2.5 mg Oral Every Tues, Thurs, Sat, Sun    warfarin  5 mg Oral Every Mon, Wed, Fri     PRN Meds:  Current Facility-Administered Medications:     bisacodyL, 10 mg, Rectal, Daily PRN    chlorproMAZINE, 25 mg, Oral, TID PRN    dextrose 50%, 12.5 g, Intravenous, PRN    dextrose 50%, 12.5 g, Intravenous, PRN    dextrose 50%, 12.5 g, Intravenous, PRN    dextrose 50%, 25 g, Intravenous, PRN    dextrose 50%, 25 g, Intravenous, PRN    fentaNYL, 25 mcg, Intravenous, Q5 Min PRN    glucagon (human recombinant), 1 mg, Intramuscular, PRN    glucagon (human recombinant), 1 mg, Intramuscular, PRN    glucagon (human recombinant), 1 mg, Intramuscular, PRN    glucose, 16 g, Oral, PRN    glucose, 16 g, Oral, PRN    glucose, 24 g, Oral, PRN    glucose, 24 g, Oral, PRN    insulin aspart U-100, 0-5 Units, Subcutaneous, QID (AC + HS) PRN    ondansetron, 4 mg, Intravenous, Daily PRN     Objective:     Vital Signs (Most Recent):  Temp: 97.5 °F (36.4 °C) (06/23/25 0750)  Pulse: 87 (06/23/25 1119)  Resp: 18 (06/23/25 0920)  BP: (!) 102/55 (06/23/25 0800)  SpO2: 99 % (06/23/25 0920) Vital Signs (24h Range):  Temp:  [97.3 °F (36.3 °C)-99 °F (37.2 °C)] 97.5 °F (36.4 °C)  Pulse:  [] 87  Resp:  [11-20] 18  SpO2:  [94 %-100 %] 99 %  BP: ()/(0-66) 102/55       Intake/Output Summary (Last 24 hours) at 6/23/2025 1234  Last data filed at 6/23/2025  1136  Gross per 24 hour   Intake 500 ml   Output 1380 ml   Net -880 ml       Physical Exam  HENT:      Head: Normocephalic.   Eyes:      Extraocular Movements: Extraocular movements intact.   Cardiovascular:      Rate and Rhythm: Normal rate and regular rhythm.      Comments: LVAD hum is smooth  Pulmonary:      Effort: Pulmonary effort is normal.      Breath sounds: Normal breath sounds.   Abdominal:      General: Abdomen is flat.      Palpations: Abdomen is soft.   Skin:     General: Skin is warm and dry.   Neurological:      General: No focal deficit present.         Significant Labs:  BMP:   Recent Labs   Lab 06/23/25  0646 06/23/25  0851   GLU  --  243*   NA  --  133*   K  --  4.6   CL  --  98   CO2  --  27   BUN  --  28*   CREATININE  --  1.3   CALCIUM  --  9.0   MG 2.1  --      CBC:   Recent Labs   Lab 06/23/25  0646   WBC 12.64   RBC 3.56*   HGB 10.4*   HCT 31.8*      MCV 89   MCH 29.2   MCHC 32.7     CMP:   Recent Labs   Lab 06/23/25  0646 06/23/25  0851   GLU  --  243*   CALCIUM  --  9.0   ALBUMIN 2.2*  --    PROT 9.0*  --    NA  --  133*   K  --  4.6   CO2  --  27   CL  --  98   BUN  --  28*   CREATININE  --  1.3   ALKPHOS 207*  --    ALT 15  --    AST 28  --    BILITOT 1.1*  --      Coagulation:   Recent Labs   Lab 06/23/25  0646   INR 1.5*   APTT 34.7*       Significant Diagnostics:  I have reviewed and interpreted all pertinent imaging results/findings within the past 24 hours.    Procedure: Device Interrogation Including analysis of device parameters  Current Settings: Ventricular Assist Device  Review of device function is stable      6/23/2025    11:45 AM 6/23/2025     8:00 AM 6/23/2025     4:01 AM 6/23/2025    12:12 AM 6/22/2025     8:21 PM 6/22/2025     4:15 PM 6/22/2025    11:44 AM   TXP LVAD INTERROGATIONS   Type HeartMate3 HeartMate3 HeartMate3 HeartMate3 HeartMate3 HeartMate3 HeartMate3   Flow 4.4 4.4 4.4 4.3 4.3 4.2 4.5   Speed 5200 5200 5200 5200 5200 5200 5200   PI 6.1 5.8 5.6 6.6 6.7  5.7 6.1   Power (Edwards) 3.6 3.6 3.6 3.8 3.6 3.6 3.6   LSL 4800 4800 4800 4800 4800 4800 4800   Pulsatility Intermittent pulse Intermittent pulse Pulse Pulse Pulse Intermittent pulse Intermittent pulse

## 2025-06-23 NOTE — PROGRESS NOTES
[facility]  Cardiothoracic Surgery  Evaluation and Management/VAD interrogation       Patient Name: Rocco France  MRN: [id number]  Admission Date: 6/20/2025  Hospital Length of Stay: 3 days  Code Status: Full Code   Attending Physician: Mike Chauhan MD   Referring Provider: David Elder MD  Principal Problem:Type 2 diabetes mellitus  Subjective:     Post-Op Info:  Procedure(s) (LRB):  AMPUTATION, TOE, THROUGH METATARSAL HEAD (Right)   1 Day Post-Op     Subjective:     Post-Op Info:  Procedure(s) (LRB):  AMPUTATION, TOE, THROUGH METATARSAL HEAD (Right)   1 Day Post-Op        Interval History: Partial toe amputation yesterday      Medications:  Continuous Infusions:  Scheduled Meds:   amLODIPine  10 mg Oral Daily    atorvastatin  80 mg Oral Daily    cefTRIAXone (Rocephin) IV (PEDS and ADULTS)  2 g Intravenous Q24H    insulin aspart U-100  8 Units Subcutaneous TIDWM    insulin glargine U-100  24 Units Subcutaneous Daily    mirtazapine  30 mg Oral QHS    mupirocin   Nasal BID    polyethylene glycol  17 g Oral Daily    [START ON 6/24/2025] warfarin  2.5 mg Oral Every Tues, Thurs, Sat, Sun    warfarin  5 mg Oral Every Mon, Wed, Fri     PRN Meds:  Current Facility-Administered Medications:     bisacodyL, 10 mg, Rectal, Daily PRN    chlorproMAZINE, 25 mg, Oral, TID PRN    dextrose 50%, 12.5 g, Intravenous, PRN    dextrose 50%, 12.5 g, Intravenous, PRN    dextrose 50%, 12.5 g, Intravenous, PRN    dextrose 50%, 25 g, Intravenous, PRN    dextrose 50%, 25 g, Intravenous, PRN    fentaNYL, 25 mcg, Intravenous, Q5 Min PRN    glucagon (human recombinant), 1 mg, Intramuscular, PRN    glucagon (human recombinant), 1 mg, Intramuscular, PRN    glucagon (human recombinant), 1 mg, Intramuscular, PRN    glucose, 16 g, Oral, PRN    glucose, 16 g, Oral, PRN    glucose, 24 g, Oral, PRN    glucose, 24 g, Oral, PRN    insulin aspart U-100, 0-5 Units, Subcutaneous, QID (AC + HS) PRN    ondansetron, 4 mg,  Intravenous, Daily PRN     Objective:     Vital Signs (Most Recent):  Temp: 97.5 °F (36.4 °C) (06/23/25 0750)  Pulse: 87 (06/23/25 1119)  Resp: 18 (06/23/25 0920)  BP: (!) 102/55 (06/23/25 0800)  SpO2: 99 % (06/23/25 0920) Vital Signs (24h Range):  Temp:  [97.3 °F (36.3 °C)-99 °F (37.2 °C)] 97.5 °F (36.4 °C)  Pulse:  [] 87  Resp:  [11-20] 18  SpO2:  [94 %-100 %] 99 %  BP: ()/(0-66) 102/55       Intake/Output Summary (Last 24 hours) at 6/23/2025 1234  Last data filed at 6/23/2025 1136  Gross per 24 hour   Intake 500 ml   Output 1380 ml   Net -880 ml       Physical Exam  HENT:      Head: Normocephalic.   Eyes:      Extraocular Movements: Extraocular movements intact.   Cardiovascular:      Rate and Rhythm: Normal rate and regular rhythm.      Comments: LVAD hum is smooth  Pulmonary:      Effort: Pulmonary effort is normal.      Breath sounds: Normal breath sounds.   Abdominal:      General: Abdomen is flat.      Palpations: Abdomen is soft.   Skin:     General: Skin is warm and dry.   Neurological:      General: No focal deficit present.         Significant Labs:  BMP:   Recent Labs   Lab 06/23/25  0646 06/23/25  0851   GLU  --  243*   NA  --  133*   K  --  4.6   CL  --  98   CO2  --  27   BUN  --  28*   CREATININE  --  1.3   CALCIUM  --  9.0   MG 2.1  --      CBC:   Recent Labs   Lab 06/23/25  0646   WBC 12.64   RBC 3.56*   HGB 10.4*   HCT 31.8*      MCV 89   MCH 29.2   MCHC 32.7     CMP:   Recent Labs   Lab 06/23/25  0646 06/23/25  0851   GLU  --  243*   CALCIUM  --  9.0   ALBUMIN 2.2*  --    PROT 9.0*  --    NA  --  133*   K  --  4.6   CO2  --  27   CL  --  98   BUN  --  28*   CREATININE  --  1.3   ALKPHOS 207*  --    ALT 15  --    AST 28  --    BILITOT 1.1*  --      Coagulation:   Recent Labs   Lab 06/23/25  0646   INR 1.5*   APTT 34.7*       Significant Diagnostics:  I have reviewed and interpreted all pertinent imaging results/findings within the past 24 hours.    Procedure: Device  Interrogation Including analysis of device parameters  Current Settings: Ventricular Assist Device  Review of device function is stable      6/23/2025    11:45 AM 6/23/2025     8:00 AM 6/23/2025     4:01 AM 6/23/2025    12:12 AM 6/22/2025     8:21 PM 6/22/2025     4:15 PM 6/22/2025    11:44 AM   TXP LVAD INTERROGATIONS   Type HeartMate3 HeartMate3 HeartMate3 HeartMate3 HeartMate3 HeartMate3 HeartMate3   Flow 4.4 4.4 4.4 4.3 4.3 4.2 4.5   Speed 5200 5200 5200 5200 5200 5200 5200   PI 6.1 5.8 5.6 6.6 6.7 5.7 6.1   Power (Edwards) 3.6 3.6 3.6 3.8 3.6 3.6 3.6   LSL 4800 4800 4800 4800 4800 4800 4800   Pulsatility Intermittent pulse Intermittent pulse Pulse Pulse Pulse Intermittent pulse Intermittent pulse       Assessment/Plan:     LVAD (left ventricular assist device) present  - Admitted for right cerebral acute subdural hematoma and acute basal cistern subarachnoid hemorrhage  - Implant Date: 01/13/2021  - Speed: 5200  - Low Flow Events: None noted  - Interval History was obtained from team member during rounding today.  - VAD/SERGEI teaching was performed with patient.  - Mobilization/Physical Therapy is ongoing.  - Anticoagulation: Coumadin  - INR: 1.5  - Duiresis: Held  - Urine Output: 1630  - BUN: 28  - Creat: 1.3  - LDH: 204  - GNR in Bcx; source of infection found to be an infected toe   - Partial amputation with podiatry on 6/22     More than 50 percent of the care dominated counseling and coordinating care with different team members. The VAD was interrogated and no significant findings were found in the history. All these findings are documented in the note above.          Suzanne Mariano NP  Cardiothoracic Surgery  [facility]

## 2025-06-23 NOTE — PROGRESS NOTES
Notified by RN that pt is missing back up clips and is going to the OR now for toe amputation. I asked about the time for decision of amputation and asked her to please page perfusion as they are the people who stay with patients in the OR. Also explained to page me if he has any trouble with his clips in route and I will need to assist but can wait til tomorrow morning. No questions for me at this time.

## 2025-06-23 NOTE — PROGRESS NOTES
"[facility] - Cardiology Stepdown  Infectious Disease  Progress Note    Patient Name: Rocco France  MRN: [id number]  Admission Date: 6/20/2025  Length of Stay: 3 days  Attending Physician: Mike Chauhan MD  Primary Care Provider: Jay Guardado DO    Isolation Status: No active isolations  Assessment/Plan:      ID  E coli bacteremia  69M with h/o T2DM, HTN, CHF s/p HM3 implantation 2021 admitted 6/20 with generalized weakness, inability to "hold his urine" and hyperglycemia. Found to be febrile with toe wound. S/p ray amputation R toe  6/22, purulence noted. Bcx with e.coli (pan susceptible). Prox margin path/cultures pending ("Insufficient incubation"). Fevers resolved. Wbc trending down. On ceftriaxone, tolerating    Suspect toe infection was source of bacteremia, risk factor wet shoes. Appreciate podiatry assistance with source control. Quite possible foot infection polymicrobial, but awaiting results of culture (something growing, insufficient incubation). Needs at least 2 weeks of abx for bacteremia, but suspect he has residual infection in foot so will likely need 6 week course of abx (PO abx may be an option, TBD)    Recommendations:   - continue ceftriaxone  - f/u cultures (repeat bcx pending, foot cultures pending)      I have reviewed hospital notes from  HTS service and other specialty providers. I have also reviewed CBC, CMP/BMP,  cultures and imaging with my interpretation as documented.             Thank you for your consult. I will follow-up with patient. Please contact us if you have any additional questions.    Yun Siegel MD  Infectious Disease  [facility] - [facility] Step[facility]    Subjective:     Principal Problem:Type 2 diabetes mellitus    HPI: A 69-year-old man with NICM, CAD, combined HF, Afib, s/p ICD, s/p LVAD on 1/13/21, DM2, subdural hematoma with subarachnoid hemorrhage in 2022 who presented to a local ED with generalized weakness. This was associated with three falls. EMS services " found the patient to be hyperglycemic with blood sugar level noted to be over 500. On evaluation in the ED he was noted to be febrile to 101.1 F. This was associated with dysuria and hesitancy.  He was admitted to [facility] and started on cefepime plus vancomycin. Admission blood cultures on 6/19 are now positive for GNR identified as E coli on BCID.      Infection History:  October 4, 2021- DLI due to P aeruginosa, empiric Cipro plus doxycycline.  Interval History: s/p ray amputation R foot on 6/22- purulence with gas noted. Reports several episodes of urinary incontinence prior to arrival. Denies back pain. Reports he didn't realize how bad his foot wound was until he was in hospital. Reports shoes had been getting wet accidentally with the recent rain fall.     Review of Systems   Constitutional:  Negative for chills, fatigue and fever.   HENT:  Negative for ear pain, mouth sores, nosebleeds, postnasal drip, rhinorrhea, sinus pressure, sore throat, tinnitus, trouble swallowing and voice change.    Eyes:  Negative for photophobia, pain, redness and visual disturbance.   Respiratory:  Negative for apnea, cough, chest tightness, shortness of breath and wheezing.    Cardiovascular:  Negative for chest pain, palpitations and leg swelling.   Gastrointestinal:  Negative for abdominal pain, blood in stool, constipation, diarrhea, nausea and vomiting.   Endocrine: Negative for cold intolerance, heat intolerance, polydipsia and polyuria.   Genitourinary:  Negative for decreased urine volume, difficulty urinating, dysuria, flank pain, frequency, genital sores, hematuria, penile discharge, penile pain, penile swelling, scrotal swelling, testicular pain and urgency.   Musculoskeletal:  Negative for arthralgias, back pain, joint swelling, myalgias and neck pain.   Skin:  Positive for wound. Negative for color change and rash.   Allergic/Immunologic: Negative for environmental allergies and food allergies.   Neurological:  Negative  for dizziness, seizures, syncope, weakness, light-headedness, numbness and headaches.   Hematological:  Negative for adenopathy. Does not bruise/bleed easily.   Psychiatric/Behavioral:  Negative for agitation, confusion, decreased concentration, hallucinations, self-injury, sleep disturbance and suicidal ideas. The patient is not nervous/anxious.      Objective:     Vital Signs (Most Recent):  Temp: 98.2 °F (36.8 °C) (06/23/25 1200)  Pulse: 85 (06/23/25 1200)  Resp: 18 (06/23/25 1200)  BP: 93/64 (06/23/25 1202)  SpO2: 98 % (06/23/25 1200) Vital Signs (24h Range):  Temp:  [97.3 °F (36.3 °C)-99 °F (37.2 °C)] 98.2 °F (36.8 °C)  Pulse:  [] 85  Resp:  [11-20] 18  SpO2:  [94 %-100 %] 98 %  BP: ()/(0-66) 93/64     Weight: 72.2 kg (159 lb 2.8 oz)  Body mass index is 20.44 kg/m².    Estimated Creatinine Clearance: 54.8 mL/min (based on SCr of 1.3 mg/dL).     Physical Exam  Vitals and nursing note reviewed. Exam conducted with a chaperone present.   Constitutional:       Appearance: Normal appearance.   HENT:      Head: Normocephalic and atraumatic.   Eyes:      General: No scleral icterus.        Right eye: No discharge.         Left eye: No discharge.      Conjunctiva/sclera: Conjunctivae normal.   Cardiovascular:      Rate and Rhythm: Normal rate and regular rhythm.      Pulses: Normal pulses.      Heart sounds: No murmur heard.     Comments: VAD Humming sounds  Pulmonary:      Effort: Pulmonary effort is normal. No respiratory distress.      Breath sounds: Normal breath sounds. No stridor. No wheezing or rhonchi.   Abdominal:      General: There is no distension.      Palpations: Abdomen is soft.      Tenderness: There is no abdominal tenderness.      Comments: DLES covered. No tenderness to palpation.   Musculoskeletal:      Comments: Right foot covered with compression bandage.   Skin:     General: Skin is warm and dry.   Neurological:      General: No focal deficit present.      Mental Status: He is alert  and oriented to person, place, and time.          Significant Labs: CBC:   Recent Labs   Lab 06/22/25  0733 06/23/25  0646   WBC 12.23 12.64   HGB 9.9* 10.4*   HCT 30.6* 31.8*    220     CMP:   Recent Labs   Lab 06/22/25  1808 06/23/25  0019 06/23/25  0646 06/23/25  0851   * 134*  --  133*   K 3.7 4.2  --  4.6   CL 98 98  --  98   CO2 26 26  --  27   * 124*  --  243*   BUN 30* 29*  --  28*   CREATININE 1.3 1.2  --  1.3   CALCIUM 9.2 9.0  --  9.0   PROT  --   --  9.0*  --    ALBUMIN  --   --  2.2*  --    BILITOT  --   --  1.1*  --    ALKPHOS  --   --  207*  --    AST  --   --  28  --    ALT  --   --  15  --    ANIONGAP 11 10  --  8     Microbiology Results (last 7 days)       Procedure Component Value Units Date/Time    Aerobic culture [[id number]] Collected: 06/22/25 1450    Order Status: Completed Specimen: Bone from Toe, Right Foot Updated: 06/23/25 1310     CULTURE, AEROBIC Insufficient incubation, culture in progress    Fungus culture [[id number]]  (Normal) Collected: 06/22/25 1450    Order Status: Completed Specimen: Bone from Toe, Right Foot Updated: 06/23/25 1009     Fungal Culture Culture In Progress    Culture, Anaerobic [[id number]] Collected: 06/22/25 1450    Order Status: Completed Specimen: Bone from Toe, Right Foot Updated: 06/23/25 0743     Anaerobe Culture Culture In Progress    Blood culture [[id number]] Collected: 06/23/25 0646    Order Status: Resulted Specimen: Blood from Peripheral, Antecubital, Right Updated: 06/23/25 0723    Gram stain [[id number]] Collected: 06/22/25 1450    Order Status: Completed Specimen: Bone from Toe, Right Foot Updated: 06/22/25 1857     GRAM STAIN No WBCs, epithelial cells or organisms seen    Afb Culture Stain [[id number]] Collected: 06/22/25 1450    Order Status: Sent Specimen: Bone from Toe, Right Foot Updated: 06/22/25 1640    AFB Culture & Smear [[id number]] Collected: 06/22/25 1450    Order Status: Resulted Specimen: Bone from Toe, Right Foot  Updated: 06/22/25 1640    Culture, Anaerobe [[id number]]     Order Status: Sent Specimen: Skin     Aerobic culture [[id number]]     Order Status: Sent Specimen: Skin             Significant Imaging: I have reviewed all pertinent imaging results/findings within the past 24 hours.

## 2025-06-23 NOTE — PLAN OF CARE
Pt free of falls and injury. Pt denies any pain or discomfort. Pt AAOx4. BG monitored. Fall precautions remain in place. LVAD hum present and smooth. VAD numbers and dopplers WDL. DLES dressing CDI. Plan of care reviewed with pt.       Problem: Adult Inpatient Plan of Care  Goal: Plan of Care Review  Outcome: Progressing  Goal: Patient-Specific Goal (Individualized)  Outcome: Progressing  Goal: Optimal Comfort and Wellbeing  Outcome: Progressing     Problem: Diabetes Comorbidity  Goal: Blood Glucose Level Within Targeted Range  Outcome: Progressing     Problem: Sepsis/Septic Shock  Goal: Absence of Bleeding  Outcome: Progressing  Goal: Absence of Infection Signs and Symptoms  Outcome: Progressing     Problem: Ventricular Assist Device  Goal: Optimal Adjustment to Device  Outcome: Progressing  Goal: Absence of Bleeding  Outcome: Progressing  Goal: Absence of Infection Signs and Symptoms  Outcome: Progressing     Problem: Skin Injury Risk Increased  Goal: Skin Health and Integrity  Outcome: Progressing     Problem: Wound  Goal: Optimal Pain Control and Function  Outcome: Progressing  Goal: Skin Health and Integrity  Outcome: Progressing  Goal: Optimal Wound Healing  Outcome: Progressing     Problem: Fall Injury Risk  Goal: Absence of Fall and Fall-Related Injury  Outcome: Progressing

## 2025-06-23 NOTE — NURSING
Notified Brandy.NP pt has frequent PVCs and 2 to 3 beats V tach every now and then, asymptomatic, HR stays in 80s to 90s.

## 2025-06-23 NOTE — ASSESSMENT & PLAN NOTE
69 year old male with diabetic ulcer to the right hallux. Marked leukocytosis and E coli bacteremia.     Assessment:  Ulceration to the right hallux with depth to bone.  XR negative for convincing bony destruction.  Now status post partial 1st ray amputation, 6/22/25.    Plan:   - Patient seen and evaluated bedside by Podiatry.  Dressing change, plan of care discussed.  - Right foot dressed with xeroform overlying incision, gauze padding, kerlix, ace to secure  - WBAT heel emphasis short distances in Darco shoe.    - PT/OT for mobility, appreciate recommendations.   - Abx per ID, appreciate recommendations. Tracing pre-operative wound cultures, intra-op clean margins.   - Podiatry will follow. Please reach out with any questions.     Future discharge recs:  - Patient to follow up in Podiatry Clinic outpatient, Podiatry will arrange.  - SNF or HH to apply bandaging as described, x3/week: xeroform overlying incision, gauze padding, kerlix, ace to secure  - Abx plan per ID  - WBAT heel emphasis short distances in Darco shoe.   - Patient to keep dressings clean dry and intact  - Patient explained the importance of daily foot checks

## 2025-06-23 NOTE — SUBJECTIVE & OBJECTIVE
"Interval HPI:   No acute events overnight. Patient in room 3098/3098 A. Blood glucose stable. BG at goal on current insulin regimen (SSI, prandial, and basal insulin ). Steroid use- None. 1 Day Post-Op  Renal function- Normal   Lab Results   Component Value Date    CREATININE 1.2 2025     Vasopressors-  None     Endocrine will continue to follow and manage insulin orders inpatient.     Diet Consistent Carbohydrate 2000 Calories (up to 75 gm per meal)     Eatin%  Nausea: No  Hypoglycemia and intervention: No  Fever: No  TPN and/or TF: No  If yes, type of TF/TPN and rate: N/A    BP (!) 86/0 (BP Location: Left arm, Patient Position: Lying)   Pulse 100   Temp 97.5 °F (36.4 °C) (Oral)   Resp 18   Ht 6' 2" (1.88 m)   Wt 72.2 kg (159 lb 2.8 oz)   SpO2 99%   BMI 20.44 kg/m²     Labs Reviewed and Include    Recent Labs   Lab 25  0019 25  0646   *  --    CALCIUM 9.0  --    ALBUMIN  --  2.2*   PROT  --  9.0*   *  --    K 4.2  --    CO2 26  --    CL 98  --    BUN 29*  --    CREATININE 1.2  --    ALKPHOS  --  207*   ALT  --  15   AST  --  28   BILITOT  --  1.1*     Lab Results   Component Value Date    WBC 12.64 2025    HGB 10.4 (L) 2025    HCT 31.8 (L) 2025    MCV 89 2025     2025     No results for input(s): "TSH", "FREET4" in the last 168 hours.  Lab Results   Component Value Date    HGBA1C 12.4 (H) 2025       Nutritional status:   Body mass index is 20.44 kg/m².  Lab Results   Component Value Date    ALBUMIN 2.2 (L) 2025    ALBUMIN 2.6 (L) 2025    ALBUMIN 2.6 (L) 2025     Lab Results   Component Value Date    PREALBUMIN 5 (L) 2025    PREALBUMIN 5 (L) 2025    PREALBUMIN 7 (L) 2025       Estimated Creatinine Clearance: 59.3 mL/min (based on SCr of 1.2 mg/dL).    Accu-Checks  Recent Labs     25  2131 25  0733 25  1150 25  1550 25  0733 25  1138 25  1534 " 06/22/25 2025 06/23/25  0634 06/23/25  0737   POCTGLUCOSE 358* 397* 360* 236* 189* 177* 167* 178* 148* 157*       Current Medications and/or Treatments Impacting Glycemic Control  Immunotherapy:    Immunosuppressants       None          Steroids:   Hormones (From admission, onward)      None          Pressors:    Autonomic Drugs (From admission, onward)      None          Hyperglycemia/Diabetes Medications:   Antihyperglycemics (From admission, onward)      Start     Stop Route Frequency Ordered    06/22/25 1130  insulin aspart U-100 pen 8 Units         -- SubQ 3 times daily with meals 06/22/25 0736    06/22/25 0900  insulin glargine U-100 (Lantus) pen 24 Units         -- SubQ Daily 06/22/25 0736    06/20/25 1449  insulin aspart U-100 pen 0-5 Units         -- SubQ Before meals & nightly PRN 06/20/25 1349

## 2025-06-23 NOTE — PROGRESS NOTES
[facility] - Cardiology Stepdown  Heart Transplant  Progress Note    Patient Name: Rocco France  MRN: [id number]  Admission Date: 6/20/2025  Hospital Length of Stay: 3 days  Attending Physician: Mike Chauhan MD  Primary Care Provider: Jay Guardado DO  Principal Problem:Type 2 diabetes mellitus    Subjective:   Patient reports feeling much better this morning. Leukocytosis and fever resolved. Post R great toe amputation yesterday. PT consulted for out of bed mobility. Plan to restart home diuretics and GDMT as tolerated. .   Assessment and Plan:     No notes on file    * Type 2 diabetes mellitus  Pt presenting with hyperglycemia of nearly 600. Pt dry on exam on AM eval, overnight initiated on diuresis with IVP, discontinued this morning.   D/w Endocrine, given downtrending glucose levels, will initiate scheduled insulin.   -po intake per SLP recommendations - diabetic diet ordered   -endocrine following, appreciate recs.     LVAD (left ventricular assist device) present  Procedure: Device Interrogation Including analysis of device parameters  Current Settings: Ventricular Assist Device  Review of device function is stable/unstable stable        6/23/2025     8:00 AM 6/23/2025     4:01 AM 6/23/2025    12:12 AM 6/22/2025     8:21 PM 6/22/2025     4:15 PM 6/22/2025    11:44 AM 6/22/2025     7:32 AM   TXP LVAD INTERROGATIONS   Type HeartMate3 HeartMate3 HeartMate3 HeartMate3 HeartMate3 HeartMate3 HeartMate3   Flow 4.4 4.4 4.3 4.3 4.2 4.5 4.3   Speed 5200 5200 5200 5200 5200 5200 5200   PI 5.8 5.6 6.6 6.7 5.7 6.1 5.7   Power (Edwards) 3.6 3.6 3.8 3.6 3.6 3.6 3.6   LSL 4800 4800 4800 4800 4800 4800 4800   Pulsatility Intermittent pulse Pulse Pulse Pulse Intermittent pulse Intermittent pulse Intermittent pulse         Acute osteomyelitis of toe, right  Likely source for septic shock on presentation.  -podiatry seeing pt, plan for OR 6/22 . Appreciate recs   -cont rocephin, ID following, appreciate recs  -cont holding  diuretics     E coli bacteremia  -cont rocephin  -ID following, appreciate recs  -R toe osteomyelitis is most likely source     Shock  Pt initially presented with elevated lactic and elevated glucose, elevated serum osm but not quite to level of HHS. DKA workup negative. No pH for eval completed. Septic shock with source most likely R foot.     -endocrine following, appreciate recs  -stop diuresis, cont to hold   -monitor renal function   -podiatry c/s, appreciate recs, OR 6/22 for partial amputation       Anticoagulated  -cont warfarin per INR   -pharmacy assisting with dosing         Aramis Nava, PJ  Heart Transplant  [facility] - Cardiology Stepdown

## 2025-06-23 NOTE — SUBJECTIVE & OBJECTIVE
Interval History: s/p ray amputation R foot on 6/22- purulence with gas noted. Reports several episodes of urinary incontinence prior to arrival. Denies back pain. Reports he didn't realize how bad his foot wound was until he was in hospital. Reports shoes had been getting wet accidentally with the recent rain fall.     Review of Systems   Constitutional:  Negative for chills, fatigue and fever.   HENT:  Negative for ear pain, mouth sores, nosebleeds, postnasal drip, rhinorrhea, sinus pressure, sore throat, tinnitus, trouble swallowing and voice change.    Eyes:  Negative for photophobia, pain, redness and visual disturbance.   Respiratory:  Negative for apnea, cough, chest tightness, shortness of breath and wheezing.    Cardiovascular:  Negative for chest pain, palpitations and leg swelling.   Gastrointestinal:  Negative for abdominal pain, blood in stool, constipation, diarrhea, nausea and vomiting.   Endocrine: Negative for cold intolerance, heat intolerance, polydipsia and polyuria.   Genitourinary:  Negative for decreased urine volume, difficulty urinating, dysuria, flank pain, frequency, genital sores, hematuria, penile discharge, penile pain, penile swelling, scrotal swelling, testicular pain and urgency.   Musculoskeletal:  Negative for arthralgias, back pain, joint swelling, myalgias and neck pain.   Skin:  Positive for wound. Negative for color change and rash.   Allergic/Immunologic: Negative for environmental allergies and food allergies.   Neurological:  Negative for dizziness, seizures, syncope, weakness, light-headedness, numbness and headaches.   Hematological:  Negative for adenopathy. Does not bruise/bleed easily.   Psychiatric/Behavioral:  Negative for agitation, confusion, decreased concentration, hallucinations, self-injury, sleep disturbance and suicidal ideas. The patient is not nervous/anxious.      Objective:     Vital Signs (Most Recent):  Temp: 98.2 °F (36.8 °C) (06/23/25 1200)  Pulse: 85  (06/23/25 1200)  Resp: 18 (06/23/25 1200)  BP: 93/64 (06/23/25 1202)  SpO2: 98 % (06/23/25 1200) Vital Signs (24h Range):  Temp:  [97.3 °F (36.3 °C)-99 °F (37.2 °C)] 98.2 °F (36.8 °C)  Pulse:  [] 85  Resp:  [11-20] 18  SpO2:  [94 %-100 %] 98 %  BP: ()/(0-66) 93/64     Weight: 72.2 kg (159 lb 2.8 oz)  Body mass index is 20.44 kg/m².    Estimated Creatinine Clearance: 54.8 mL/min (based on SCr of 1.3 mg/dL).     Physical Exam  Vitals and nursing note reviewed. Exam conducted with a chaperone present.   Constitutional:       Appearance: Normal appearance.   HENT:      Head: Normocephalic and atraumatic.   Eyes:      General: No scleral icterus.        Right eye: No discharge.         Left eye: No discharge.      Conjunctiva/sclera: Conjunctivae normal.   Cardiovascular:      Rate and Rhythm: Normal rate and regular rhythm.      Pulses: Normal pulses.      Heart sounds: No murmur heard.     Comments: VAD Humming sounds  Pulmonary:      Effort: Pulmonary effort is normal. No respiratory distress.      Breath sounds: Normal breath sounds. No stridor. No wheezing or rhonchi.   Abdominal:      General: There is no distension.      Palpations: Abdomen is soft.      Tenderness: There is no abdominal tenderness.      Comments: DLES covered. No tenderness to palpation.   Musculoskeletal:      Comments: Right foot covered with compression bandage.   Skin:     General: Skin is warm and dry.   Neurological:      General: No focal deficit present.      Mental Status: He is alert and oriented to person, place, and time.          Significant Labs: CBC:   Recent Labs   Lab 06/22/25  0733 06/23/25  0646   WBC 12.23 12.64   HGB 9.9* 10.4*   HCT 30.6* 31.8*    220     CMP:   Recent Labs   Lab 06/22/25  1808 06/23/25  0019 06/23/25  0646 06/23/25  0851   * 134*  --  133*   K 3.7 4.2  --  4.6   CL 98 98  --  98   CO2 26 26  --  27   * 124*  --  243*   BUN 30* 29*  --  28*   CREATININE 1.3 1.2  --  1.3    CALCIUM 9.2 9.0  --  9.0   PROT  --   --  9.0*  --    ALBUMIN  --   --  2.2*  --    BILITOT  --   --  1.1*  --    ALKPHOS  --   --  207*  --    AST  --   --  28  --    ALT  --   --  15  --    ANIONGAP 11 10  --  8     Microbiology Results (last 7 days)       Procedure Component Value Units Date/Time    Aerobic culture [[id number]] Collected: 06/22/25 1450    Order Status: Completed Specimen: Bone from Toe, Right Foot Updated: 06/23/25 1310     CULTURE, AEROBIC Insufficient incubation, culture in progress    Fungus culture [[id number]]  (Normal) Collected: 06/22/25 1450    Order Status: Completed Specimen: Bone from Toe, Right Foot Updated: 06/23/25 1009     Fungal Culture Culture In Progress    Culture, Anaerobic [[id number]] Collected: 06/22/25 1450    Order Status: Completed Specimen: Bone from Toe, Right Foot Updated: 06/23/25 0743     Anaerobe Culture Culture In Progress    Blood culture [[id number]] Collected: 06/23/25 0646    Order Status: Resulted Specimen: Blood from Peripheral, Antecubital, Right Updated: 06/23/25 0723    Gram stain [[id number]] Collected: 06/22/25 1450    Order Status: Completed Specimen: Bone from Toe, Right Foot Updated: 06/22/25 1857     GRAM STAIN No WBCs, epithelial cells or organisms seen    Afb Culture Stain [[id number]] Collected: 06/22/25 1450    Order Status: Sent Specimen: Bone from Toe, Right Foot Updated: 06/22/25 1640    AFB Culture & Smear [[id number]] Collected: 06/22/25 1450    Order Status: Resulted Specimen: Bone from Toe, Right Foot Updated: 06/22/25 1640    Culture, Anaerobe [[id number]]     Order Status: Sent Specimen: Skin     Aerobic culture [[id number]]     Order Status: Sent Specimen: Skin             Significant Imaging: I have reviewed all pertinent imaging results/findings within the past 24 hours.

## 2025-06-23 NOTE — NURSING
Notified  pt has no order for wound care after amputation, and  said podiatry team will come to change pt dressing today. 1220: podiatry team at bedside change pt's RT foot dressing.

## 2025-06-23 NOTE — CARE UPDATE
[facility] Care Support Interaction      I have reviewed the chart of Rocco France who is hospitalized for Type 2 diabetes mellitus. The patient is currently located in the following unit: CSU        I have assisted the primary physician in management of the following:      MRSA Decolonization - CHG ordered     Kalyn Bautista PA-C  Unit [facility]

## 2025-06-23 NOTE — ASSESSMENT & PLAN NOTE
Procedure: Device Interrogation Including analysis of device parameters  Current Settings: Ventricular Assist Device  Review of device function is stable/unstable stable        6/23/2025     8:00 AM 6/23/2025     4:01 AM 6/23/2025    12:12 AM 6/22/2025     8:21 PM 6/22/2025     4:15 PM 6/22/2025    11:44 AM 6/22/2025     7:32 AM   TXP LVAD INTERROGATIONS   Type HeartMate3 HeartMate3 HeartMate3 HeartMate3 HeartMate3 HeartMate3 HeartMate3   Flow 4.4 4.4 4.3 4.3 4.2 4.5 4.3   Speed 5200 5200 5200 5200 5200 5200 5200   PI 5.8 5.6 6.6 6.7 5.7 6.1 5.7   Power (Edwards) 3.6 3.6 3.8 3.6 3.6 3.6 3.6   LSL 4800 4800 4800 4800 4800 4800 4800   Pulsatility Intermittent pulse Pulse Pulse Pulse Intermittent pulse Intermittent pulse Intermittent pulse

## 2025-06-23 NOTE — PROGRESS NOTES
"[facility] - Cardiology Stepdown  Endocrinology  Progress Note    Admit Date: 6/20/2025     Reason for Consult: Management of T2DM, Hyperglycemia     Surgical Procedure and Date:  s/p right toe amputation 06/22/2025    Diabetes diagnosis year: 2021    Home Diabetes Medications:  None currently     Lab Results   Component Value Date    HGBA1C 6.6 (H) 05/19/2023       How often checking glucose at home? 1x per week   BG readings on regimen: 120s  Hypoglycemia on the regimen?  N/A  Missed doses on regimen?  N/A     Diabetes Complications include:     Hyperglycemia    Complicating diabetes co morbidities:   HTN, CHF      HPI:   Patient is a 69 y.o. male with a diagnosis of type 2 diabetes, hypertension,stage D CHF due to NICM underwent HM3 implantation 1/13/2021 with sternal closure 1/14/2021.  He was admitted for syncope 1/27/2022 at which time he was found to have an evolving right cerebral acute subdural hematoma and acute basal cistern subarachnoid hemorrhage. Patient presented to outside hospital emergency room with generalized weakness.  Patient is a poor historian.  He reports falling 3 times at home and has had weakness over the past week.  EMS called, noted sugar greater than 500.  Upon arrival, oral temperature is 101.1° F. patient complaining of dysuria, inability to "hold his urine".  Patient states he saw a doctor today, unable to give a urine sample, but had his INR drawn which was greater than 4.5. Endocrinology consulted for management of T2DM.     Interval HPI:   No acute events overnight. Patient in room 3098/3098 A. Blood glucose stable. BG at goal on current insulin regimen (SSI, prandial, and basal insulin ). Steroid use- None. 1 Day Post-Op  Renal function- Normal   Lab Results   Component Value Date    CREATININE 1.2 06/23/2025     Vasopressors-  None     Endocrine will continue to follow and manage insulin orders inpatient.     Diet Consistent Carbohydrate 2000 Calories (up to 75 gm per meal) " "    Eatin%  Nausea: No  Hypoglycemia and intervention: No  Fever: No  TPN and/or TF: No  If yes, type of TF/TPN and rate: N/A    BP (!) 86/0 (BP Location: Left arm, Patient Position: Lying)   Pulse 100   Temp 97.5 °F (36.4 °C) (Oral)   Resp 18   Ht 6' 2" (1.88 m)   Wt 72.2 kg (159 lb 2.8 oz)   SpO2 99%   BMI 20.44 kg/m²     Labs Reviewed and Include    Recent Labs   Lab 25  0019 25  0646   *  --    CALCIUM 9.0  --    ALBUMIN  --  2.2*   PROT  --  9.0*   *  --    K 4.2  --    CO2 26  --    CL 98  --    BUN 29*  --    CREATININE 1.2  --    ALKPHOS  --  207*   ALT  --  15   AST  --  28   BILITOT  --  1.1*     Lab Results   Component Value Date    WBC 12.64 2025    HGB 10.4 (L) 2025    HCT 31.8 (L) 2025    MCV 89 2025     2025     No results for input(s): "TSH", "FREET4" in the last 168 hours.  Lab Results   Component Value Date    HGBA1C 12.4 (H) 2025       Nutritional status:   Body mass index is 20.44 kg/m².  Lab Results   Component Value Date    ALBUMIN 2.2 (L) 2025    ALBUMIN 2.6 (L) 2025    ALBUMIN 2.6 (L) 2025     Lab Results   Component Value Date    PREALBUMIN 5 (L) 2025    PREALBUMIN 5 (L) 2025    PREALBUMIN 7 (L) 2025       Estimated Creatinine Clearance: 59.3 mL/min (based on SCr of 1.2 mg/dL).    Accu-Checks  Recent Labs     25  2131 25  0733 25  1150 25  1550 25  0733 25  1138 25  1534 25  2025 25  0634 25  0737   POCTGLUCOSE 358* 397* 360* 236* 189* 177* 167* 178* 148* 157*       Current Medications and/or Treatments Impacting Glycemic Control  Immunotherapy:    Immunosuppressants       None          Steroids:   Hormones (From admission, onward)      None          Pressors:    Autonomic Drugs (From admission, onward)      None          Hyperglycemia/Diabetes Medications:   Antihyperglycemics (From admission, onward)      Start     " Stop Route Frequency Ordered    06/22/25 1130  insulin aspart U-100 pen 8 Units         -- SubQ 3 times daily with meals 06/22/25 0736    06/22/25 0900  insulin glargine U-100 (Lantus) pen 24 Units         -- SubQ Daily 06/22/25 0736    06/20/25 1449  insulin aspart U-100 pen 0-5 Units         -- SubQ Before meals & nightly PRN 06/20/25 1349            ASSESSMENT and PLAN    Cardiac/Vascular  LVAD (left ventricular assist device) present  Managed per primary team  Optimize BG control        ID  Acute osteomyelitis of toe, right  S/p right toe amputation  Infection may elevate BG readings  On IV antibiotics  Managed per primary team  Avoid hypoglycemia        Endocrine  * Type 2 diabetes mellitus  BG goal 140-180    Plan:  Continue Lantus 24 units once daily   Continue Novolog 8 units TID with meals   Low Dose Correction Scale  BG monitoring ac/hs    ** Please call Endocrine for any BG related issues **      Lab Results   Component Value Date    HGBA1C 12.4 (H) 06/20/2025               Juanita Debby Garrido PA-C  Endocrinology  [facility] - Cardiology Stepdown

## 2025-06-23 NOTE — ASSESSMENT & PLAN NOTE
S/p right toe amputation  Infection may elevate BG readings  On IV antibiotics  Managed per primary team  Avoid hypoglycemia

## 2025-06-23 NOTE — PLAN OF CARE
VSS. BG monitored. LVAD number and doppler WNL. LVAD dressing changed per protocol ( see note). Rt toe dressing changed by podiatry team during the day. Pt educated on fall risk and remained free from falls/trauma/injury. Denies chest pain, SOB, palpitations, dizziness, pain, or discomfort. Plan of care reviewed with pt, all questions answered. Bed locked in lowest position, call bell within reach, no acute distress noted, will continue to monitor.

## 2025-06-23 NOTE — PROGRESS NOTES
06/23/2025  Carly Mando    Current provider:  Mike Chauhan MD    Device interrogation:      6/23/2025     8:00 AM 6/23/2025     4:01 AM 6/23/2025    12:12 AM 6/22/2025     8:21 PM 6/22/2025     4:15 PM 6/22/2025    11:44 AM 6/22/2025     7:32 AM   TXP LVAD INTERROGATIONS   Type HeartMate3 HeartMate3 HeartMate3 HeartMate3 HeartMate3 HeartMate3 HeartMate3   Flow 4.4 4.4 4.3 4.3 4.2 4.5 4.3   Speed 5200 5200 5200 5200 5200 5200 5200   PI 5.8 5.6 6.6 6.7 5.7 6.1 5.7   Power (Edwards) 3.6 3.6 3.8 3.6 3.6 3.6 3.6   LSL 4800 4800 4800 4800 4800 4800 4800   Pulsatility Intermittent pulse Pulse Pulse Pulse Intermittent pulse Intermittent pulse Intermittent pulse          Rounded on Rocco France to ensure all mechanical assist device settings (IABP or VAD) were appropriate and all parameters were within limits.  I was able to ensure all back up equipment was present, the staff had no issues, and the Perfusion Department daily rounding was complete.      For implantable VADs: Interrogation of Ventricular assist device was performed with analysis of device parameters and review of device function. I have personally reviewed the interrogation findings and agree with findings as stated.     In emergency, the nursing units have been notified to contact the perfusion department either by:  Calling [id number] from 630am to 4pm Mon thru Fri, utilizing the On-Call Finder functionality of Epic and searching for Perfusion, or by contacting the hospital  from 4pm to 630am and on weekends and asking to speak with the perfusionist on call.    10:38 AM

## 2025-06-23 NOTE — SUBJECTIVE & OBJECTIVE
Subjective:     Interval History:  Patient seen and evaluated, dressing change performed today.  Status post amputation of 1st ray.  Plan of care discussed.  Negligible pain.  Examination otherwise stable.    Follow-up For: Procedure(s) (LRB):  AMPUTATION, TOE, THROUGH METATARSAL HEAD (Right)    Post-Operative Day: 1 Day Post-Op    Scheduled Meds:   amLODIPine  10 mg Oral Daily    atorvastatin  80 mg Oral Daily    cefTRIAXone (Rocephin) IV (PEDS and ADULTS)  2 g Intravenous Q24H    insulin aspart U-100  8 Units Subcutaneous TIDWM    insulin glargine U-100  24 Units Subcutaneous Daily    mirtazapine  30 mg Oral QHS    mupirocin   Nasal BID    polyethylene glycol  17 g Oral Daily    [START ON 6/24/2025] warfarin  2.5 mg Oral Every Tues, Thurs, Sat, Sun    warfarin  5 mg Oral Every Mon, Wed, Fri     Continuous Infusions:  PRN Meds:  Current Facility-Administered Medications:     bisacodyL, 10 mg, Rectal, Daily PRN    chlorproMAZINE, 25 mg, Oral, TID PRN    dextrose 50%, 12.5 g, Intravenous, PRN    dextrose 50%, 12.5 g, Intravenous, PRN    dextrose 50%, 12.5 g, Intravenous, PRN    dextrose 50%, 25 g, Intravenous, PRN    dextrose 50%, 25 g, Intravenous, PRN    fentaNYL, 25 mcg, Intravenous, Q5 Min PRN    glucagon (human recombinant), 1 mg, Intramuscular, PRN    glucagon (human recombinant), 1 mg, Intramuscular, PRN    glucagon (human recombinant), 1 mg, Intramuscular, PRN    glucose, 16 g, Oral, PRN    glucose, 16 g, Oral, PRN    glucose, 24 g, Oral, PRN    glucose, 24 g, Oral, PRN    insulin aspart U-100, 0-5 Units, Subcutaneous, QID (AC + HS) PRN    ondansetron, 4 mg, Intravenous, Daily PRN    Review of Systems   Constitutional:  Negative for chills, diaphoresis, fatigue and fever.   HENT: Negative.     Eyes: Negative.    Respiratory: Negative.     Cardiovascular:  Negative for chest pain and leg swelling.   Gastrointestinal:  Negative for abdominal pain, diarrhea, nausea and vomiting.   Endocrine: Negative.     Genitourinary: Negative.    Musculoskeletal: Negative.    Skin:  Positive for wound (surgical incision right foot).     Objective:     Vital Signs (Most Recent):  Temp: 98.2 °F (36.8 °C) (06/23/25 1200)  Pulse: 85 (06/23/25 1200)  Resp: 18 (06/23/25 1200)  BP: 93/64 (06/23/25 1202)  SpO2: 98 % (06/23/25 1200) Vital Signs (24h Range):  Temp:  [97.3 °F (36.3 °C)-99 °F (37.2 °C)] 98.2 °F (36.8 °C)  Pulse:  [] 85  Resp:  [11-20] 18  SpO2:  [94 %-100 %] 98 %  BP: ()/(0-66) 93/64     Weight: 72.2 kg (159 lb 2.8 oz)  Body mass index is 20.44 kg/m².    Foot Exam    Laboratory:  CBC:   Recent Labs   Lab 06/23/25  0646   WBC 12.64   RBC 3.56*   HGB 10.4*   HCT 31.8*      MCV 89   MCH 29.2   MCHC 32.7     CMP:   Recent Labs   Lab 06/23/25  0646 06/23/25  0851   GLU  --  243*   CALCIUM  --  9.0   ALBUMIN 2.2*  --    PROT 9.0*  --    NA  --  133*   K  --  4.6   CO2  --  27   CL  --  98   BUN  --  28*   CREATININE  --  1.3   ALKPHOS 207*  --    ALT 15  --    AST 28  --    BILITOT 1.1*  --      Microbiology Results (last 7 days)       Procedure Component Value Units Date/Time    Aerobic culture [[id number]] Collected: 06/22/25 1450    Order Status: Completed Specimen: Bone from Toe, Right Foot Updated: 06/23/25 1310     CULTURE, AEROBIC Insufficient incubation, culture in progress    Fungus culture [[id number]]  (Normal) Collected: 06/22/25 1450    Order Status: Completed Specimen: Bone from Toe, Right Foot Updated: 06/23/25 1009     Fungal Culture Culture In Progress    Culture, Anaerobic [[id number]] Collected: 06/22/25 1450    Order Status: Completed Specimen: Bone from Toe, Right Foot Updated: 06/23/25 0743     Anaerobe Culture Culture In Progress    Blood culture [[id number]] Collected: 06/23/25 0646    Order Status: Resulted Specimen: Blood from Peripheral, Antecubital, Right Updated: 06/23/25 0723    Gram stain [[id number]] Collected: 06/22/25 1450    Order Status: Completed Specimen: Bone from Toe,  Right Foot Updated: 06/22/25 1857     GRAM STAIN No WBCs, epithelial cells or organisms seen    Afb Culture Stain [[id number]] Collected: 06/22/25 1450    Order Status: Sent Specimen: Bone from Toe, Right Foot Updated: 06/22/25 1640    AFB Culture & Smear [[id number]] Collected: 06/22/25 1450    Order Status: Resulted Specimen: Bone from Toe, Right Foot Updated: 06/22/25 1640    Culture, Anaerobe [[id number]]     Order Status: Sent Specimen: Skin     Aerobic culture [[id number]]     Order Status: Sent Specimen: Skin           Specimen (24h ago, onward)       Start     Ordered    06/22/25 1452  Specimen to Pathology Podiatry  RELEASE UPON ORDERING        References:    Click here for ordering Quick Tip   Question:  Release to patient  Answer:  Immediate    06/22/25 1452                  Recent Labs   Lab 06/21/25  1751   COLORU Yellow   SPECGRAV 1.020   PHUR 6.0   PROTEINUA 1+*   BACTERIA Rare   NITRITE Negative   LEUKOCYTESUR Negative   UROBILINOGEN Negative   HYALINECASTS 0     All pertinent labs reviewed within the last 24 hours.    Diagnostic Results:  I have reviewed all pertinent imaging results/findings within the past 24 hours.    Clinical Findings: Right foot

## 2025-06-23 NOTE — PLAN OF CARE
PT re-eval completed- see note for details, goals and POC updated.     Problem: Physical Therapy  Goal: Physical Therapy Goal  Description: Goals to be met by: 25     Patient will increase functional independence with mobility by performin. Sit to stand transfer with Modified [place]  2. Bed to chair transfer with Stand by assistance using LRAD or no AD  3. Gait  x 150 feet with Stand by assistance using LRAD or no AD.   4. Ascend/descend 3 stair with no Handrails Minimum assistance using LRAD or no AD.     Outcome: Progressing   2025

## 2025-06-23 NOTE — ASSESSMENT & PLAN NOTE
"69M with h/o T2DM, HTN, CHF s/p HM3 implantation 2021 admitted 6/20 with generalized weakness, inability to "hold his urine" and hyperglycemia. Found to be febrile with toe wound. S/p ray amputation R toe  6/22, purulence noted. Bcx with e.coli (pan susceptible). Prox margin path/cultures pending ("Insufficient incubation"). Fevers resolved. Wbc trending down. On ceftriaxone, tolerating    Suspect toe infection was source of bacteremia, risk factor wet shoes. Appreciate podiatry assistance with source control. Quite possible foot infection polymicrobial, but awaiting results of culture (something growing, insufficient incubation). Needs at least 2 weeks of abx for bacteremia, but suspect he has residual infection in foot so will likely need 6 week course of abx (PO abx may be an option, TBD)    Recommendations:   - continue ceftriaxone  - f/u cultures (repeat bcx pending, foot cultures pending)      I have reviewed hospital notes from  HTS service and other specialty providers. I have also reviewed CBC, CMP/BMP,  cultures and imaging with my interpretation as documented.     "

## 2025-06-23 NOTE — NURSING
Rounded on patient, no family at bedside. Patient denies pain s/p surgery. He has not ambulated since toe amputation; HTS team will consult PT. Patient has also not had a BM since surgery, was given miralax today.

## 2025-06-23 NOTE — ASSESSMENT & PLAN NOTE
- Admitted for right cerebral acute subdural hematoma and acute basal cistern subarachnoid hemorrhage  - Implant Date: 01/13/2021  - Speed: 5200  - Low Flow Events: None noted  - Interval History was obtained from team member during rounding today.  - VAD/SERGEI teaching was performed with patient.  - Mobilization/Physical Therapy is ongoing.  - Anticoagulation: Coumadin  - INR: 1.5  - Duiresis: Held  - Urine Output: 1630  - BUN: 28  - Creat: 1.3  - LDH: 204  - GNR in Bcx; source of infection found to be an infected toe   - Partial amputation with podiatry on 6/22     More than 50 percent of the care dominated counseling and coordinating care with different team members. The VAD was interrogated and no significant findings were found in the history. All these findings are documented in the note above.

## 2025-06-23 NOTE — PT/OT/SLP RE-EVAL
Occupational Therapy   [id number]  Co-evaluation/treatment performed due to patient's multiple deficits requiring two skilled therapists to appropriately and safely assess patient's strength, endurance, functional mobility, and ADL performance while facilitating functional tasks in addition to accommodating for patient's activity tolerance and medical acuity.   Name: Rocco France  MRN: [id number]  Admitting Diagnosis:  Type 2 diabetes mellitus  Recent Surgery: Procedure(s) (LRB):  AMPUTATION, TOE, THROUGH METATARSAL HEAD (Right) 1 Day Post-Op    Recommendations:     Discharge Recommendations: Low Intensity Therapy  Discharge Equipment Recommendations: walker, rolling  Barriers to discharge:  None    Assessment:     Rocco France is a 69 y.o. male with a medical diagnosis of Type 2 diabetes mellitus.  He presents with great participation with presented therapeutic assessments/interventions. Pt educated on new post-surgical precautions/importance of wearing DARCO with all OOB activities; with demonstrated understanding and good dynamic standing balance. Pt presented with impaired activity tolerance and endurance for preferred occupations of choice today. Pt is currently not at his PLOF and would greatly benefit from continued skilled OT intervention in efforts to participate in meaningful occupations of choice at the highest level of independence.   Performance deficits affecting function are weakness, impaired endurance, impaired self care skills, impaired functional mobility, gait instability, impaired balance, pain, decreased lower extremity function, impaired cardiopulmonary response to activity, orthopedic precautions.      Rehab Prognosis:  good; patient would benefit from acute skilled OT services to address these deficits and reach maximum level of function.       Plan:     Patient to be seen 4 x/week to address the above listed problems via self-care/home management, community/work re-entry,  "therapeutic activities, therapeutic exercises, neuromuscular re-education  Plan of Care Expires: 07/05/25  Plan of Care Reviewed with: patient    Subjective     Chief Complaint: pain in R foot  Patient/Family stated goals: "I just feel funny"  Communicated with: RN prior to session.  Pain/Comfort:  Pain Rating 1: 5/10  Location - Side 1: Right  Location 1: foot  Pain Addressed 1: Reposition, Distraction  Pain Rating Post-Intervention 1: 5/10    Objective:     Communicated with: RN prior to session.  Patient found HOB elevated with: telemetry, Condom Catheter, LVAD upon OT entry to room.    General Precautions: Standard, fall, LVAD, aspiration, diabetic  Orthopedic Precautions: RLE weight bearing as tolerated (darco shoe and short distances)  Braces:  (darco shoe)  Respiratory Status: Room air    Occupational Performance:    Bed Mobility:    Patient completed Rolling/Turning to Right with stand by assistance  Patient completed Supine to Sit with stand by assistance    Functional Mobility/Transfers:  Patient completed Sit <> Stand Transfer with minimum assistance  with  rolling walker   Patient completed Bed <> Chair Transfer using Step Transfer technique with minimum assistance with rolling walker  Functional Mobility: deferred 2/2 pt reporting fatigue despite education/encouragement provided     Activities of Daily Living:  Upper Body Dressing: minimum assistance to don gown posteriorly seated EOB  Lower Body Dressing: total assistance to don darco shoe (1st time demonstration purposes) seated EOB  Toileting: dependence [place] in place    Cognitive/Visual Perceptual:  Cognitive/Psychosocial Skills:     -       Oriented to: Person, Place, Time, and Situation   -       Follows Commands/attention:Follows multistep  commands  -       Communication: clear/fluent  -       Memory: No Deficits noted  -       Safety awareness/insight to disability: impaired   -       Mood/Affect/Coping skills/emotional control: " Pleasant    Physical Exam:  Upper Extremity Range of Motion:     -       Right Upper Extremity: WFL  -       Left Upper Extremity: WFL  Upper Extremity Strength:    -       Right Upper Extremity: WFL  -       Left Upper Extremity: WFL   Strength:    -       Right Upper Extremity: WFL  -       Left Upper Extremity: WFL    AMPAC 6 Click:  AMPA Total Score: 19    Treatment & Education:  Pt educated on the following:  - role of OT and OT POC, including DC from OT. Pt verbalized understanding.  - importance of continued mobilization  - Safe transfer techniques and proper body mechanics for fall prevention and improved independence with functional transfers   - Importance of OOB activities to increase endurance and tolerance for increased participation in daily ADLs.    - All pt questions within OT scope of practice addressed, pt verbalized understanding.     Pt provided with energy conservation training (utilizing toilet seat for rest breaks, and grab bars when completing t/f tasks) when performing ADL tasks in efforts to increase activity tolerance and independence in meaningful occupations.     Pt educated on the benefits of completing OOB ADL/functional mobility tasks with hospital staff present, pt with good understanding.       Patient left up in chair with all lines intact, call button in reach, and RN notified    GOALS:   Multidisciplinary Problems       Occupational Therapy Goals          Problem: Occupational Therapy    Goal Priority Disciplines Outcome Interventions   Occupational Therapy Goal     OT, PT/OT Progressing    Description: Goals to be met by: 7/5/25     Patient will increase functional independence with ADLs by performing:    LE Dressing with Modified [place].  Grooming while standing at sink with Modified [place].  Toileting from toilet with Modified [place] for hygiene and clothing management.   Supine to sit with Modified [place].  Toilet transfer to toilet with Modified  [place].                         DME Justifications:   Rocco's mobility limitation cannot be sufficiently resolved by the use of a cane. His functional mobility deficit can be sufficiently resolved with the use of a Rolling Walker. Patient's mobility limitation significantly impairs their ability to participate in one of more activities of daily living.  The use of a RW will significantly improve the patient's ability to participate in MRADLS and the patient will use it on regular basis in the home.    History:     Past Medical History:   Diagnosis Date    Acute respiratory failure requiring reintubation 1/28/2022    CLARISSE (acute kidney injury) 1/11/2021    Diabetes mellitus     Kidney stones          Past Surgical History:   Procedure Laterality Date    AMPUTATION, TOE, THROUGH METATARSAL HEAD Right 6/22/2025    Procedure: AMPUTATION, TOE, THROUGH METATARSAL HEAD;  Surgeon: Latasha Elise DPM;  Location: [address];  Service: Podiatry;  Laterality: Right;    CARDIAC CATHETERIZATION  2010    no stents    CARDIAC DEFIBRILLATOR PLACEMENT  2010    CARDIAC DEFIBRILLATOR PLACEMENT      HERNIA REPAIR      IRRIGATION OF MEDIASTINUM N/A 1/14/2021    Procedure: IRRIGATION, MEDIASTINUM;  Surgeon: Zenon Corona MD;  Location: [address];  Service: Cardiovascular;  Laterality: N/A;    KNEE ARTHROSCOPY Right     LEFT VENTRICULAR ASSIST DEVICE Left 1/13/2021    Procedure: INSERTION- HeartMate 3 LEFT VENTRICULAR ASSIST DEVICE ;  Surgeon: Zenon Corona MD;  Location: [address];  Service: Cardiovascular;  Laterality: Left;    RECONSTRUCTION OF PERICARDIUM N/A 1/14/2021    Procedure: RECONSTRUCTION, PERICARDIUM;  Surgeon: Zenon Corona MD;  Location: [address];  Service: Cardiovascular;  Laterality: N/A;    RIGHT HEART CATHETERIZATION Right 11/2/2020    Procedure: INSERTION, CATHETER, RIGHT HEART;  Surgeon: Deana Lake MD;  Location: [facility] CATH LAB;  Service: Cardiology;  Laterality: Right;     RIGHT HEART CATHETERIZATION Right 3/24/2022    Procedure: INSERTION, CATHETER, RIGHT HEART;  Surgeon: Manuel Ramos Jr., MD;  Location: [facility] CATH LAB;  Service: Cardiology;  Laterality: Right;    STERNAL WOUND CLOSURE  1/13/2021    Procedure: TEMPORARY CLOSURE OF CHEST;  Surgeon: Zenon Corona MD;  Location: [facility] OR [address] [place] [address];  Service: Cardiovascular;;    STERNAL WOUND CLOSURE N/A 1/14/2021    Procedure: CLOSURE, WOUND, STERNUM;  Surgeon: Zenon Corona MD;  Location: [facility] OR [address] [place] [address];  Service: Cardiovascular;  Laterality: N/A;       Time Tracking:     OT Date of Treatment: 06/23/25  OT Start Time: 1408  OT Stop Time: 1424  OT Total Time (min): 16 min    Billable Minutes:Re-eval 8  Self Care/Home Management 8    6/23/2025

## 2025-06-23 NOTE — PROGRESS NOTES
Visited with patient to confirm backup equipment. Patient does not have his backup clips with him eva this time. Patient stated he does not have anyone to bring them here he is not sure where they are. Issued patient new set of backup clips.  LOT:  [id number]

## 2025-06-23 NOTE — ASSESSMENT & PLAN NOTE
BG goal 140-180    Plan:  Continue Lantus 24 units once daily   Continue Novolog 8 units TID with meals   Low Dose Correction Scale  BG monitoring ac/hs    ** Please call Endocrine for any BG related issues **      Lab Results   Component Value Date    HGBA1C 12.4 (H) 06/20/2025

## 2025-06-23 NOTE — PT/OT/SLP RE-EVAL
Physical Therapy Re-evaluation and Treatment    OT present for coeval due to pt's multiple medical comorbidities and functional/cognition deficits requiring two skilled therapists to appropriately progress pt's musculoskeletal strength, neuromuscular control, and endurance while taking into consideration medical acuity and pt safety.    Patient Name:  Rocco France   MRN:  [id number]    Recommendations:     Discharge Recommendations: Low Intensity Therapy  Discharge Equipment Recommendations: walker, rolling   Barriers to discharge: None    Assessment:     Rocco France is a 69 y.o. male admitted with a medical diagnosis of Type 2 diabetes mellitus.  He presents with the following impairments/functional limitations: weakness, impaired endurance, impaired self care skills, impaired functional mobility, gait instability, impaired balance, impaired cardiopulmonary response to activity, orthopedic precautions     Pt receptive and tolerated PT co-re-eval with OT well. Pt is s/p partial amputation of 1st ray of R foot. Pt educated on WBAT: right lower extremity in darco shoe precautions prior to start of treatment with pt verbalizing understanding. Pt needed min A and a RW to amb to the bedside chair this session. Pt politely declined further amb. Patient continues to demonstrate the need for low intensity therapy on a scheduled basis exhibited by decreased independence with self-care and functional mobility    Rehab Prognosis:  Good; patient would benefit from acute skilled PT services to address these deficits and reach maximum level of function.      Recent Surgery: Procedure(s) (LRB):  AMPUTATION, TOE, THROUGH METATARSAL HEAD (Right) 1 Day Post-Op    Plan:     During this hospitalization, patient to be seen 4 x/week to address the above listed problems via gait training, therapeutic activities, therapeutic exercises, neuromuscular re-education  Plan of Care Expires:  07/23/25  Plan of Care Reviewed with:  patient    Subjective     Communicated with RN prior to session.  Patient found HOB elevated with telemetry, Condom Catheter, LVAD upon PT entry to room, agreeable to evaluation.      Chief Complaint: mild pain of R foot  Patient comments/goals: To get better  Pain/Comfort:  Pain Rating 1: 5/10  Location - Side 1: Right  Location - Orientation 1: generalized  Location 1: first toe  Pain Addressed 1: Reposition, Distraction  Pain Rating Post-Intervention 1: 5/10    Patients cultural, spiritual, Adventism conflicts given the current situation: no      Objective:     Patient found with: telemetry, Condom Catheter, LVAD     General Precautions: Standard, fall, LVAD, aspiration  Orthopedic Precautions: RLE weight bearing as tolerated (in darco shoe for short distance)  Braces:  (R darco shoe)  Respiratory Status: Room air    Exams:  Gross Motor Coordination:  WFL  Sensation:    -       Intact  RLE ROM: WFL  RLE Strength: WFL except ankle resistance not tested, grossly 3/5  LLE ROM: WFL  LLE Strength: WFL    Functional Mobility:    Bed Mobility:   Rolling: to R with stand by assistance  Supine > Sit: stand by assistance  R darco shoe donned once sitting EOB    Transfers: Verbal cues for hand placement  Sit <> Stand Transfer: minimum assistance from EOB using rolling walker   Bed <> Chair: minimum assistance using rolling walker     Balance:   Sitting balance: FAIR+: Maintains balance through MINIMAL excursions of active trunk motion  Standing balance:   FAIR: Maintains without assist but unable to take challenges  POOR+: Needs MIN (minimal ) assist during gait                 Gait:  Distance: ~5 ft to bedside chair   Assistive Device: RW  Assistance Level: minimum assistance  Gait Assessment: Pt amb with small steps and decreased yayo, and mild generalized unsteadiness but no LOB      AM-PAC 6 CLICK MOBILITY  Total Score:18       Treatment and Education:   Pt educated on tip to reduce fall risk and safety with  mobility and using call button for assistance from nursing staff with OOB mobility.  Pt educated on sitting up in chair 2-4 hours of the day  All questions answered within the scope of PT.  White board updated accordingly.    Patient left up in chair with all lines intact, call button in reach, and RN notified.    GOALS:   Multidisciplinary Problems       Physical Therapy Goals          Problem: Physical Therapy    Goal Priority Disciplines Outcome Interventions   Physical Therapy Goal     PT, PT/OT Progressing    Description: Goals to be met by: 25     Patient will increase functional independence with mobility by performin. Sit to stand transfer with Modified [place]  2. Bed to chair transfer with Stand by assistance using LRAD or no AD  3. Gait  x 150 feet with Stand by assistance using LRAD or no AD.   4. Ascend/descend 3 stair with no Handrails Minimum assistance using LRAD or no AD.                          DME Justifications:   Rocco's mobility limitation cannot be sufficiently resolved by the use of a cane. His functional mobility deficit can be sufficiently resolved with the use of a Rolling Walker. Patient's mobility limitation significantly impairs their ability to participate in one of more activities of daily living.  The use of a RW will significantly improve the patient's ability to participate in MRADLS and the patient will use it on regular basis in the home.    History:     Past Medical History:   Diagnosis Date    Acute respiratory failure requiring reintubation 2022    CLARISSE (acute kidney injury) 2021    Diabetes mellitus     Kidney stones        Past Surgical History:   Procedure Laterality Date    AMPUTATION, TOE, THROUGH METATARSAL HEAD Right 2025    Procedure: AMPUTATION, TOE, THROUGH METATARSAL HEAD;  Surgeon: Latasha Elise DPM;  Location: [facility] OR [address] [place] [address];  Service: Podiatry;  Laterality: Right;    CARDIAC CATHETERIZATION      no stents    CARDIAC  DEFIBRILLATOR PLACEMENT  2010    CARDIAC DEFIBRILLATOR PLACEMENT      HERNIA REPAIR      IRRIGATION OF MEDIASTINUM N/A 1/14/2021    Procedure: IRRIGATION, MEDIASTINUM;  Surgeon: Zenon Corona MD;  [facility];  Service: Cardiovascular;  Laterality: N/A;    KNEE ARTHROSCOPY Right     LEFT VENTRICULAR ASSIST DEVICE Left 1/13/2021    Procedure: INSERTION- HeartMate 3 LEFT VENTRICULAR ASSIST DEVICE ;  Surgeon: Zenon Corona MD;  [facility];  Service: Cardiovascular;  Laterality: Left;    RECONSTRUCTION OF PERICARDIUM N/A 1/14/2021    Procedure: RECONSTRUCTION, PERICARDIUM;  Surgeon: Zenon Corona MD;  [facility];  Service: Cardiovascular;  Laterality: N/A;    RIGHT HEART CATHETERIZATION Right 11/2/2020    Procedure: INSERTION, CATHETER, RIGHT HEART;  Surgeon: Deana Lake MD;  Location: [facility] CATH LAB;  Service: Cardiology;  Laterality: Right;    RIGHT HEART CATHETERIZATION Right 3/24/2022    Procedure: INSERTION, CATHETER, RIGHT HEART;  Surgeon: Manuel Ramos Jr., MD;  Location: [facility] CATH LAB;  Service: Cardiology;  Laterality: Right;    STERNAL WOUND CLOSURE  1/13/2021    Procedure: TEMPORARY CLOSURE OF CHEST;  Surgeon: Zenon Corona MD;  [facility];  Service: Cardiovascular;;    STERNAL WOUND CLOSURE N/A 1/14/2021    Procedure: CLOSURE, WOUND, STERNUM;  Surgeon: Zenon Corona MD;  [facility];  Service: Cardiovascular;  Laterality: N/A;       Time Tracking:     PT Received On: 06/23/25  PT Start Time: 1408     PT Stop Time: 1424  PT Total Time (min): 16 min     Billable Minutes: Re-eval 8 and Gait Training 8      06/23/2025

## 2025-06-23 NOTE — PT/OT/SLP PROGRESS
Speech Language Pathology Treatment  Discharge    Patient Name:  Rocco France   MRN:  [id number]  3098/[id number] A    Admitting Diagnosis: Type 2 diabetes mellitus    Recommendations:                 General Recommendations:  Follow-up not indicated  Diet recommendations:  Regular Diet - IDDSI Level 7, Liquid Diet Level: Thin liquids - IDDSI Level 0   Aspiration Precautions: 1 small sip at a time, HOB to 90 degrees, Remain upright 30 minutes post meal, and Strict aspiration precautions   General Precautions: Standard, aspiration, LVAD, fall  Communication strategies:  none  Discharge recommendations:   (likely no needs)   Barriers to Discharge:  None       Assessment:     Rocco France is a 69 y.o. male with adequate tolerance of baseline diet with aspiration precautions in place. No further skilled acute Speech Therapy services warranted at this time. Please re-consult as needed.       Subjective     Patient awake and cooperative.     Objective:     Has the patient been evaluated by SLP for swallowing?   Yes  Keep patient NPO? No     Patient seen for an ongoing swallowing assessment. Patient assessed with sips of milk via straw, bites of dry Cheerios, and bites of Cheerios softened in milk. He presents with timely, adequate oral clearance and no overt s/s of aspiration. SLP reviewed importance of 1 sip at a time and all safe swallowing precautions. All questions addressed and patient in agreement with d/c from ST services.     Goals:   Multidisciplinary Problems       SLP Goals          Problem: SLP    Goal Priority Disciplines Outcome   SLP Goal     SLP Progressing   Description: Short Term Goals:   1. Pt will participate in an ongoing assessment to determine the least restrictive and safest diet with possible updated goals to follow pending results.                         Plan:       Plan of Care reviewed with:  patient   SLP Follow-Up:  No       Time Tracking:     SLP Treatment Date:   06/23/25  Speech Start  Time:  0951  Speech Stop Time:  0959     Speech Total Time (min):  8 min    Billable Minutes: Treatment Swallowing Dysfunction 8    06/23/2025

## 2025-06-23 NOTE — NURSING
Pt's VAD dressing changed per protocol using kit and sterile technique. Pt's drive line site is clean, dry, intact with no drainage ; DLES is + (1). No kinks or frays on drive line, secured with [place] anchor. Pt tolerated dressing change well. Next dressing change due 06/26/2025.

## 2025-06-23 NOTE — PROGRESS NOTES
[facility] - Cardiology Stepdown  Podiatry  Progress Note    Patient Name: Rocco France  MRN: [id number]  Admission Date: 6/20/2025  Hospital Length of Stay: 3 days  Attending Physician: Mike Chauhan MD  Primary Care Provider: Jay Guardado DO     Subjective:     Interval History:  Patient seen and evaluated, dressing change performed today.  Status post amputation of 1st ray.  Plan of care discussed.  Negligible pain.  Examination otherwise stable.    Follow-up For: Procedure(s) (LRB):  AMPUTATION, TOE, THROUGH METATARSAL HEAD (Right)    Post-Operative Day: 1 Day Post-Op    Scheduled Meds:   amLODIPine  10 mg Oral Daily    atorvastatin  80 mg Oral Daily    cefTRIAXone (Rocephin) IV (PEDS and ADULTS)  2 g Intravenous Q24H    insulin aspart U-100  8 Units Subcutaneous TIDWM    insulin glargine U-100  24 Units Subcutaneous Daily    mirtazapine  30 mg Oral QHS    mupirocin   Nasal BID    polyethylene glycol  17 g Oral Daily    [START ON 6/24/2025] warfarin  2.5 mg Oral Every Tues, Thurs, Sat, Sun    warfarin  5 mg Oral Every Mon, Wed, Fri     Continuous Infusions:  PRN Meds:  Current Facility-Administered Medications:     bisacodyL, 10 mg, Rectal, Daily PRN    chlorproMAZINE, 25 mg, Oral, TID PRN    dextrose 50%, 12.5 g, Intravenous, PRN    dextrose 50%, 12.5 g, Intravenous, PRN    dextrose 50%, 12.5 g, Intravenous, PRN    dextrose 50%, 25 g, Intravenous, PRN    dextrose 50%, 25 g, Intravenous, PRN    fentaNYL, 25 mcg, Intravenous, Q5 Min PRN    glucagon (human recombinant), 1 mg, Intramuscular, PRN    glucagon (human recombinant), 1 mg, Intramuscular, PRN    glucagon (human recombinant), 1 mg, Intramuscular, PRN    glucose, 16 g, Oral, PRN    glucose, 16 g, Oral, PRN    glucose, 24 g, Oral, PRN    glucose, 24 g, Oral, PRN    insulin aspart U-100, 0-5 Units, Subcutaneous, QID (AC + HS) PRN    ondansetron, 4 mg, Intravenous, Daily PRN    Review of Systems   Constitutional:  Negative for chills, diaphoresis,  fatigue and fever.   HENT: Negative.     Eyes: Negative.    Respiratory: Negative.     Cardiovascular:  Negative for chest pain and leg swelling.   Gastrointestinal:  Negative for abdominal pain, diarrhea, nausea and vomiting.   Endocrine: Negative.    Genitourinary: Negative.    Musculoskeletal: Negative.    Skin:  Positive for wound (surgical incision right foot).     Objective:     Vital Signs (Most Recent):  Temp: 98.2 °F (36.8 °C) (06/23/25 1200)  Pulse: 85 (06/23/25 1200)  Resp: 18 (06/23/25 1200)  BP: 93/64 (06/23/25 1202)  SpO2: 98 % (06/23/25 1200) Vital Signs (24h Range):  Temp:  [97.3 °F (36.3 °C)-99 °F (37.2 °C)] 98.2 °F (36.8 °C)  Pulse:  [] 85  Resp:  [11-20] 18  SpO2:  [94 %-100 %] 98 %  BP: ()/(0-66) 93/64     Weight: 72.2 kg (159 lb 2.8 oz)  Body mass index is 20.44 kg/m².    Foot Exam    Laboratory:  CBC:   Recent Labs   Lab 06/23/25  0646   WBC 12.64   RBC 3.56*   HGB 10.4*   HCT 31.8*      MCV 89   MCH 29.2   MCHC 32.7     CMP:   Recent Labs   Lab 06/23/25  0646 06/23/25  0851   GLU  --  243*   CALCIUM  --  9.0   ALBUMIN 2.2*  --    PROT 9.0*  --    NA  --  133*   K  --  4.6   CO2  --  27   CL  --  98   BUN  --  28*   CREATININE  --  1.3   ALKPHOS 207*  --    ALT 15  --    AST 28  --    BILITOT 1.1*  --      Microbiology Results (last 7 days)       Procedure Component Value Units Date/Time    Aerobic culture [[id number]] Collected: 06/22/25 1450    Order Status: Completed Specimen: Bone from Toe, Right Foot Updated: 06/23/25 1310     CULTURE, AEROBIC Insufficient incubation, culture in progress    Fungus culture [[id number]]  (Normal) Collected: 06/22/25 1450    Order Status: Completed Specimen: Bone from Toe, Right Foot Updated: 06/23/25 1009     Fungal Culture Culture In Progress    Culture, Anaerobic [[id number]] Collected: 06/22/25 1450    Order Status: Completed Specimen: Bone from Toe, Right Foot Updated: 06/23/25 0743     Anaerobe Culture Culture In Progress    Blood  culture [[id number]] Collected: 06/23/25 0646    Order Status: Resulted Specimen: Blood from Peripheral, Antecubital, Right Updated: 06/23/25 0723    Gram stain [[id number]] Collected: 06/22/25 1450    Order Status: Completed Specimen: Bone from Toe, Right Foot Updated: 06/22/25 1857     GRAM STAIN No WBCs, epithelial cells or organisms seen    Afb Culture Stain [[id number]] Collected: 06/22/25 1450    Order Status: Sent Specimen: Bone from Toe, Right Foot Updated: 06/22/25 1640    AFB Culture & Smear [[id number]] Collected: 06/22/25 1450    Order Status: Resulted Specimen: Bone from Toe, Right Foot Updated: 06/22/25 1640    Culture, Anaerobe [[id number]]     Order Status: Sent Specimen: Skin     Aerobic culture [[id number]]     Order Status: Sent Specimen: Skin           Specimen (24h ago, onward)       Start     Ordered    06/22/25 1452  Specimen to Pathology Podiatry  RELEASE UPON ORDERING        References:    Click here for ordering Quick Tip   Question:  Release to patient  Answer:  Immediate    06/22/25 1452                  Recent Labs   Lab 06/21/25  1751   COLORU Yellow   SPECGRAV 1.020   PHUR 6.0   PROTEINUA 1+*   BACTERIA Rare   NITRITE Negative   LEUKOCYTESUR Negative   UROBILINOGEN Negative   HYALINECASTS 0     All pertinent labs reviewed within the last 24 hours.    Diagnostic Results:  I have reviewed all pertinent imaging results/findings within the past 24 hours.    Clinical Findings: Right foot              Assessment/Plan:     Endocrine  Diabetic ulcer of right foot  69 year old male with diabetic ulcer to the right hallux, presented with marked leukocytosis and E coli bacteremia.     Assessment:  Ulceration to the right hallux with depth to bone.  XR negative for convincing bony destruction.  Now status post partial 1st ray amputation, 6/22/25.    Plan:   - Patient seen and evaluated bedside by Podiatry.  Dressing change, plan of care discussed.  - Right foot dressed with xeroform overlying  incision, gauze padding, kerlix, ace to secure  - WBAT heel emphasis short distances in Darco shoe.    - PT/OT for mobility, appreciate recommendations.   - Abx per ID, appreciate recommendations. Tracing pre-operative wound cultures, intra-op clean margins.   - Podiatry will follow. Please reach out with any questions.     Future discharge recs:  - Patient to follow up in Podiatry Clinic outpatient, Podiatry will arrange.  - SNF or  to apply bandaging as described, x3/week: xeroform overlying incision, gauze padding, kerlix, ace to secure  - Abx plan per ID  - WBAT heel emphasis short distances in Darco shoe.   - Patient to keep dressings clean dry and intact  - Patient explained the importance of daily foot checks      Donovan Garcia DPM PGY-2  Podiatric Medicine & Surgery  [facility]  Secure Chat Preferred  Pager: [phone]    [facility] - Cardiology Stepdown

## 2025-06-24 LAB
ABSOLUTE EOSINOPHIL (OHS): 0.31 K/UL
ABSOLUTE MONOCYTE (OHS): 1.68 K/UL (ref 0.3–1)
ABSOLUTE NEUTROPHIL COUNT (OHS): 9.17 K/UL (ref 1.8–7.7)
ANION GAP (OHS): 9 MMOL/L (ref 8–16)
APTT PPP: 34.6 SECONDS (ref 21–32)
BASOPHILS # BLD AUTO: 0.07 K/UL
BASOPHILS NFR BLD AUTO: 0.5 %
BUN SERPL-MCNC: 27 MG/DL (ref 8–23)
CALCIUM SERPL-MCNC: 8.8 MG/DL (ref 8.7–10.5)
CHLORIDE SERPL-SCNC: 100 MMOL/L (ref 95–110)
CO2 SERPL-SCNC: 24 MMOL/L (ref 23–29)
CREAT SERPL-MCNC: 1.3 MG/DL (ref 0.5–1.4)
ERYTHROCYTE [DISTWIDTH] IN BLOOD BY AUTOMATED COUNT: 14.3 % (ref 11.5–14.5)
ESTROGEN SERPL-MCNC: NORMAL PG/ML
GFR SERPLBLD CREATININE-BSD FMLA CKD-EPI: 59 ML/MIN/1.73/M2
GLUCOSE SERPL-MCNC: 143 MG/DL (ref 70–110)
HCT VFR BLD AUTO: 29.6 % (ref 40–54)
HGB BLD-MCNC: 9.4 GM/DL (ref 14–18)
IMM GRANULOCYTES # BLD AUTO: 0.16 K/UL (ref 0–0.04)
IMM GRANULOCYTES NFR BLD AUTO: 1.2 % (ref 0–0.5)
INR PPP: 1.6 (ref 0.8–1.2)
INSULIN SERPL-ACNC: NORMAL U[IU]/ML
LAB AP CLINICAL INFORMATION: NORMAL
LAB AP GROSS DESCRIPTION: NORMAL
LAB AP PERFORMING LOCATION(S): NORMAL
LAB AP REPORT FOOTNOTES: NORMAL
LDH SERPL-CCNC: 204 U/L (ref 110–260)
LYMPHOCYTES # BLD AUTO: 2.1 K/UL (ref 1–4.8)
MAGNESIUM SERPL-MCNC: 2.1 MG/DL (ref 1.6–2.6)
MCH RBC QN AUTO: 28.7 PG (ref 27–31)
MCHC RBC AUTO-ENTMCNC: 31.8 G/DL (ref 32–36)
MCV RBC AUTO: 91 FL (ref 82–98)
NUCLEATED RBC (/100WBC) (OHS): 0 /100 WBC
OHS QRS DURATION: 214 MS
OHS QTC CALCULATION: 642 MS
PHOSPHATE SERPL-MCNC: 2.8 MG/DL (ref 2.7–4.5)
PLATELET # BLD AUTO: 225 K/UL (ref 150–450)
PMV BLD AUTO: 10.9 FL (ref 9.2–12.9)
POCT GLUCOSE: 133 MG/DL (ref 70–110)
POCT GLUCOSE: 157 MG/DL (ref 70–110)
POCT GLUCOSE: 161 MG/DL (ref 70–110)
POCT GLUCOSE: 92 MG/DL (ref 70–110)
POTASSIUM SERPL-SCNC: 4.2 MMOL/L (ref 3.5–5.1)
PROTHROMBIN TIME: 16.7 SECONDS (ref 9–12.5)
RBC # BLD AUTO: 3.27 M/UL (ref 4.6–6.2)
RELATIVE EOSINOPHIL (OHS): 2.3 %
RELATIVE LYMPHOCYTE (OHS): 15.6 % (ref 18–48)
RELATIVE MONOCYTE (OHS): 12.5 % (ref 4–15)
RELATIVE NEUTROPHIL (OHS): 67.9 % (ref 38–73)
SODIUM SERPL-SCNC: 133 MMOL/L (ref 136–145)
WBC # BLD AUTO: 13.49 K/UL (ref 3.9–12.7)

## 2025-06-24 PROCEDURE — 93010 ELECTROCARDIOGRAM REPORT: CPT | Mod: ,,, | Performed by: INTERNAL MEDICINE

## 2025-06-24 PROCEDURE — 25000003 PHARM REV CODE 250: Performed by: INTERNAL MEDICINE

## 2025-06-24 PROCEDURE — 99232 SBSQ HOSP IP/OBS MODERATE 35: CPT | Mod: ,,, | Performed by: BEHAVIOR TECHNICIAN

## 2025-06-24 PROCEDURE — 83615 LACTATE (LD) (LDH) ENZYME: CPT | Performed by: STUDENT IN AN ORGANIZED HEALTH CARE EDUCATION/TRAINING PROGRAM

## 2025-06-24 PROCEDURE — 97535 SELF CARE MNGMENT TRAINING: CPT

## 2025-06-24 PROCEDURE — 63600175 PHARM REV CODE 636 W HCPCS: Performed by: INTERNAL MEDICINE

## 2025-06-24 PROCEDURE — 93750 INTERROGATION VAD IN PERSON: CPT | Mod: ,,, | Performed by: INTERNAL MEDICINE

## 2025-06-24 PROCEDURE — G0545 PR VISIT INHERENT TO INPT OR OBS CARE, INFECTIOUS DISEASE: HCPCS | Mod: ,,, | Performed by: INTERNAL MEDICINE

## 2025-06-24 PROCEDURE — 93005 ELECTROCARDIOGRAM TRACING: CPT

## 2025-06-24 PROCEDURE — 83735 ASSAY OF MAGNESIUM: CPT | Performed by: STUDENT IN AN ORGANIZED HEALTH CARE EDUCATION/TRAINING PROGRAM

## 2025-06-24 PROCEDURE — 20600001 HC STEP DOWN PRIVATE ROOM

## 2025-06-24 PROCEDURE — 84295 ASSAY OF SERUM SODIUM: CPT | Performed by: BEHAVIOR TECHNICIAN

## 2025-06-24 PROCEDURE — 94761 N-INVAS EAR/PLS OXIMETRY MLT: CPT

## 2025-06-24 PROCEDURE — 99232 SBSQ HOSP IP/OBS MODERATE 35: CPT | Mod: ,,, | Performed by: INTERNAL MEDICINE

## 2025-06-24 PROCEDURE — 85610 PROTHROMBIN TIME: CPT | Performed by: STUDENT IN AN ORGANIZED HEALTH CARE EDUCATION/TRAINING PROGRAM

## 2025-06-24 PROCEDURE — 36415 COLL VENOUS BLD VENIPUNCTURE: CPT | Performed by: STUDENT IN AN ORGANIZED HEALTH CARE EDUCATION/TRAINING PROGRAM

## 2025-06-24 PROCEDURE — 25000003 PHARM REV CODE 250: Performed by: STUDENT IN AN ORGANIZED HEALTH CARE EDUCATION/TRAINING PROGRAM

## 2025-06-24 PROCEDURE — 25000003 PHARM REV CODE 250: Performed by: BEHAVIOR TECHNICIAN

## 2025-06-24 PROCEDURE — 97116 GAIT TRAINING THERAPY: CPT

## 2025-06-24 PROCEDURE — 85025 COMPLETE CBC W/AUTO DIFF WBC: CPT | Performed by: BEHAVIOR TECHNICIAN

## 2025-06-24 PROCEDURE — 85730 THROMBOPLASTIN TIME PARTIAL: CPT | Performed by: STUDENT IN AN ORGANIZED HEALTH CARE EDUCATION/TRAINING PROGRAM

## 2025-06-24 PROCEDURE — 27000248 HC VAD-ADDITIONAL DAY

## 2025-06-24 PROCEDURE — 84100 ASSAY OF PHOSPHORUS: CPT | Performed by: STUDENT IN AN ORGANIZED HEALTH CARE EDUCATION/TRAINING PROGRAM

## 2025-06-24 RX ORDER — IBUPROFEN 200 MG
24 TABLET ORAL
Status: DISCONTINUED | OUTPATIENT
Start: 2025-06-24 | End: 2025-06-30 | Stop reason: HOSPADM

## 2025-06-24 RX ORDER — GLUCAGON 1 MG
1 KIT INJECTION
Status: DISCONTINUED | OUTPATIENT
Start: 2025-06-24 | End: 2025-06-30 | Stop reason: HOSPADM

## 2025-06-24 RX ORDER — IBUPROFEN 200 MG
16 TABLET ORAL
Status: DISCONTINUED | OUTPATIENT
Start: 2025-06-24 | End: 2025-06-30 | Stop reason: HOSPADM

## 2025-06-24 RX ORDER — SPIRONOLACTONE 25 MG/1
25 TABLET ORAL DAILY
Status: DISCONTINUED | OUTPATIENT
Start: 2025-06-24 | End: 2025-06-30 | Stop reason: HOSPADM

## 2025-06-24 RX ORDER — VANCOMYCIN 1.75 GRAM/500 ML IN 0.9 % SODIUM CHLORIDE INTRAVENOUS
25 ONCE
Status: COMPLETED | OUTPATIENT
Start: 2025-06-24 | End: 2025-06-24

## 2025-06-24 RX ORDER — SYRING-NEEDL,DISP,INSUL,0.3 ML 29 G X1/2"
296 SYRINGE, EMPTY DISPOSABLE MISCELLANEOUS ONCE
Status: DISCONTINUED | OUTPATIENT
Start: 2025-06-24 | End: 2025-06-30 | Stop reason: HOSPADM

## 2025-06-24 RX ADMIN — SODIUM CHLORIDE 1750 MG: 9 INJECTION, SOLUTION INTRAVENOUS at 11:06

## 2025-06-24 RX ADMIN — BISACODYL 10 MG: 10 SUPPOSITORY RECTAL at 10:06

## 2025-06-24 RX ADMIN — INSULIN ASPART 8 UNITS: 100 INJECTION, SOLUTION INTRAVENOUS; SUBCUTANEOUS at 12:06

## 2025-06-24 RX ADMIN — LACTULOSE 30 G: 20 SOLUTION ORAL at 09:06

## 2025-06-24 RX ADMIN — WARFARIN SODIUM 2.5 MG: 2.5 TABLET ORAL at 05:06

## 2025-06-24 RX ADMIN — PIPERACILLIN SODIUM AND TAZOBACTAM SODIUM 4.5 G: 4; .5 INJECTION, POWDER, FOR SOLUTION INTRAVENOUS at 10:06

## 2025-06-24 RX ADMIN — MUPIROCIN: 20 OINTMENT TOPICAL at 09:06

## 2025-06-24 RX ADMIN — POLYETHYLENE GLYCOL 3350 17 G: 17 POWDER, FOR SOLUTION ORAL at 09:06

## 2025-06-24 RX ADMIN — MIRTAZAPINE 30 MG: 30 TABLET, FILM COATED ORAL at 09:06

## 2025-06-24 RX ADMIN — SPIRONOLACTONE 25 MG: 25 TABLET, FILM COATED ORAL at 09:06

## 2025-06-24 RX ADMIN — INSULIN ASPART 8 UNITS: 100 INJECTION, SOLUTION INTRAVENOUS; SUBCUTANEOUS at 08:06

## 2025-06-24 RX ADMIN — INSULIN GLARGINE 24 UNITS: 100 INJECTION, SOLUTION SUBCUTANEOUS at 09:06

## 2025-06-24 RX ADMIN — ATORVASTATIN CALCIUM 80 MG: 40 TABLET, FILM COATED ORAL at 09:06

## 2025-06-24 RX ADMIN — AMLODIPINE BESYLATE 10 MG: 10 TABLET ORAL at 09:06

## 2025-06-24 RX ADMIN — PIPERACILLIN SODIUM AND TAZOBACTAM SODIUM 4.5 G: 4; .5 INJECTION, POWDER, FOR SOLUTION INTRAVENOUS at 02:06

## 2025-06-24 NOTE — PROGRESS NOTES
[facility]  Heart Transplant  Progress Note    Patient Name: Rocco France  MRN: [id number]  Admission Date: 6/20/2025  Hospital Length of Stay: 4 days  Attending Physician: Mike Chauhan MD  Primary Care Provider: Jay Guardado DO  Principal Problem:Type 2 diabetes mellitus    Subjective:   Interval History: Working with PT/ot post toe amputation. Pending ID recs for home antibiotics (IV vs PO). Vancomycin added today for enterococcus coverage.         Continuous Infusions:  Scheduled Meds:   amLODIPine  10 mg Oral Daily    atorvastatin  80 mg Oral Daily    cefTRIAXone (Rocephin) IV (PEDS and ADULTS)  2 g Intravenous Q24H    insulin aspart U-100  8 Units Subcutaneous TIDWM    insulin glargine U-100  24 Units Subcutaneous Daily    lactulose  30 g Oral TID    magnesium citrate  296 mL Oral Once    mirtazapine  30 mg Oral QHS    mupirocin   Nasal BID    polyethylene glycol  17 g Oral Daily    spironolactone  25 mg Oral Daily    vancomycin (VANCOCIN) IV (PEDS and ADULTS)  25 mg/kg Intravenous Once    warfarin  2.5 mg Oral Every Tues, Thurs, Sat, Sun    warfarin  5 mg Oral Every Mon, Wed, Fri     PRN Meds:  Current Facility-Administered Medications:     bisacodyL, 10 mg, Rectal, Daily PRN    chlorproMAZINE, 25 mg, Oral, TID PRN    dextrose 50%, 12.5 g, Intravenous, PRN    dextrose 50%, 25 g, Intravenous, PRN    fentaNYL, 25 mcg, Intravenous, Q5 Min PRN    glucagon (human recombinant), 1 mg, Intramuscular, PRN    glucose, 16 g, Oral, PRN    glucose, 24 g, Oral, PRN    insulin aspart U-100, 0-5 Units, Subcutaneous, QID (AC + HS) PRN    ondansetron, 4 mg, Intravenous, Daily PRN    Pharmacy to dose Vancomycin consult, , , Once **AND** vancomycin - pharmacy to dose, , Intravenous, pharmacy to manage frequency    Review of patient's allergies indicates:   Allergen Reactions    Pcn [penicillins] Hives     Objective:     Vital Signs (Most Recent):  Temp: 97.9 °F (36.6 °C) (06/24/25 0750)  Pulse: 90  (06/24/25 1124)  Resp: 18 (06/24/25 0750)  BP: 113/72 (06/24/25 0805)  SpO2: 98 % (06/24/25 1000) Vital Signs (24h Range):  Temp:  [97.9 °F (36.6 °C)-99.1 °F (37.3 °C)] 97.9 °F (36.6 °C)  Pulse:  [] 90  Resp:  [18] 18  SpO2:  [95 %-98 %] 98 %  BP: ()/(0-72) 113/72     Patient Vitals for the past 72 hrs (Last 3 readings):   Weight   06/24/25 0900 73.6 kg (162 lb 4.1 oz)   06/23/25 1800 73.4 kg (161 lb 13.1 oz)     Body mass index is 20.83 kg/m².      Intake/Output Summary (Last 24 hours) at 6/24/2025 1128  Last data filed at 6/24/2025 0651  Gross per 24 hour   Intake 644 ml   Output 800 ml   Net -156 ml       Hemodynamic Parameters:       Telemetry: reviewed       Physical Exam  Vitals reviewed.   Constitutional:       Appearance: He is normal weight.   HENT:      Head: Normocephalic and atraumatic.      Right Ear: External ear normal.      Left Ear: External ear normal.      Nose: Nose normal.      Mouth/Throat:      Mouth: Mucous membranes are moist.      Pharynx: Oropharynx is clear.   Eyes:      Conjunctiva/sclera: Conjunctivae normal.   Cardiovascular:      Rate and Rhythm: Normal rate and regular rhythm.      Pulses: Normal pulses.   Pulmonary:      Effort: Pulmonary effort is normal.      Breath sounds: Normal breath sounds.   Abdominal:      General: Abdomen is flat. Bowel sounds are normal.      Palpations: Abdomen is soft.   Musculoskeletal:      Cervical back: Normal range of motion.      Right lower leg: No edema.      Left lower leg: No edema.   Skin:     General: Skin is warm.      Capillary Refill: Capillary refill takes less than 2 seconds.   Neurological:      Mental Status: He is alert. Mental status is at baseline.   Psychiatric:         Mood and Affect: Mood normal.         Behavior: Behavior normal.            Significant Labs:  CBC:  Recent Labs   Lab 06/22/25  0733 06/23/25  0646 06/24/25  0258   WBC 12.23 12.64 13.49*   RBC 3.48* 3.56* 3.27*   HGB 9.9* 10.4* 9.4*   HCT 30.6* 31.8*  29.6*    220 225   MCV 88 89 91   MCH 28.4 29.2 28.7   MCHC 32.4 32.7 31.8*     BNP:  Recent Labs   Lab 06/19/25  1751 06/20/25  0959 06/23/25  0646   BNP 3,655* 615* 270*     CMP:  Recent Labs   Lab 06/19/25  1751 06/20/25  0931 06/20/25  0959 06/20/25  1404 06/23/25  0019 06/23/25  0646 06/23/25  0851 06/24/25  0258   *   < >  --    < > 124*  --  243* 143*   CALCIUM 9.1   < >  --    < > 9.0  --  9.0 8.8   ALBUMIN 2.6*  --  2.6*  --   --  2.2*  --   --    PROT 9.6*  --  9.5*  --   --  9.0*  --   --    *   < >  --    < > 134*  --  133* 133*   K 4.4   < >  --    < > 4.2  --  4.6 4.2   CO2 25   < >  --    < > 26  --  27 24   CL 87*   < >  --    < > 98  --  98 100   BUN 32*   < >  --    < > 29*  --  28* 27*   CREATININE 1.9*   < >  --    < > 1.2  --  1.3 1.3   ALKPHOS 197*  --  216*  --   --  207*  --   --    ALT 9*  --  19  --   --  15  --   --    AST 32  --  42  --   --  28  --   --    BILITOT 2.6*  --  2.7*  --   --  1.1*  --   --     < > = values in this interval not displayed.      Coagulation:   Recent Labs   Lab 06/22/25 0733 06/23/25  0646 06/24/25  0258   INR 1.5* 1.5* 1.6*   APTT 34.3* 34.7* 34.6*     LDH:  Recent Labs   Lab 06/22/25  0733 06/23/25  0646 06/24/25  0258    204 204     Microbiology:  Microbiology Results (last 7 days)       Procedure Component Value Units Date/Time    Culture, Anaerobic [[id number]] Collected: 06/22/25 1450    Order Status: Completed Specimen: Bone from Toe, Right Foot Updated: 06/24/25 1055     Anaerobe Culture Culture In Progress    Blood culture [[id number]]  (Normal) Collected: 06/23/25 0646    Order Status: Completed Specimen: Blood from Peripheral, Antecubital, Right Updated: 06/24/25 0901     Blood Culture No Growth After 24 Hours    Aerobic culture [[id number]]  (Abnormal) Collected: 06/22/25 1450    Order Status: Completed Specimen: Bone from Toe, Right Foot Updated: 06/24/25 0719     CULTURE, AEROBIC Few Enterococcus species     Comment:  Identification and susceptibility pending       Afb Culture Stain [[id number]] Collected: 06/22/25 1450    Order Status: Completed Specimen: Bone from Toe, Right Foot Updated: 06/23/25 1518     ACID FAST STAIN  No acid fast bacilli seen    Fungus culture [[id number]]  (Normal) Collected: 06/22/25 1450    Order Status: Completed Specimen: Bone from Toe, Right Foot Updated: 06/23/25 1009     Fungal Culture Culture In Progress    Gram stain [[id number]] Collected: 06/22/25 1450    Order Status: Completed Specimen: Bone from Toe, Right Foot Updated: 06/22/25 1857     GRAM STAIN No WBCs, epithelial cells or organisms seen    AFB Culture & Smear [[id number]] Collected: 06/22/25 1450    Order Status: Resulted Specimen: Bone from Toe, Right Foot Updated: 06/22/25 1640    Culture, Anaerobe [[id number]]     Order Status: Sent Specimen: Skin     Aerobic culture [[id number]]     Order Status: Sent Specimen: Skin             I have reviewed all pertinent labs within the past 24 hours.    Estimated Creatinine Clearance: 55.8 mL/min (based on SCr of 1.3 mg/dL).    Diagnostic Results:  I have reviewed and interpreted all pertinent imaging results/findings within the past 24 hours.  Assessment and Plan:     No notes on file    * Type 2 diabetes mellitus  Pt presenting with hyperglycemia of nearly 600. Pt dry on exam on AM eval, overnight initiated on diuresis with IVP, discontinued this morning.   D/w Endocrine, given downtrending glucose levels, will initiate scheduled insulin.   -po intake per SLP recommendations - diabetic diet ordered   -endocrine following, appreciate recs.     LVAD (left ventricular assist device) present  Procedure: Device Interrogation Including analysis of device parameters  Current Settings: Ventricular Assist Device  Review of device function is stable/unstable stable        6/24/2025     8:00 AM 6/24/2025     5:02 AM 6/24/2025    12:09 AM 6/23/2025     8:02 PM 6/23/2025     4:00 PM 6/23/2025    11:45 AM  6/23/2025     8:00 AM   TXP LVAD INTERROGATIONS   Type HeartMate3 HeartMate3 HeartMate3 HeartMate3 HeartMate3 HeartMate3 HeartMate3   Flow 4.2 4.2 4.4 4.5 4.6 4.4 4.4   Speed 5200 5250 5200 5200 5200 5200 5200   PI 6.2 5.5 5.6 6.9 5.7 6.1 5.8   Power (Edwards) 3.5 3.7 3.6 3.6 3.6 3.6 3.6   LSL 4800 4800 4800 4800 4800 4800 4800   Pulsatility Intermittent pulse Pulse Pulse Intermittent pulse Pulse Intermittent pulse Intermittent pulse         Acute osteomyelitis of toe, right  Likely source for septic shock on presentation.  -podiatry seeing pt, plan for OR 6/22 . Appreciate recs   -cont rocephin, ID following, appreciate recs  -cont holding diuretics     E coli bacteremia  -cont rocephin  -ID following, appreciate recs  -R toe osteomyelitis is most likely source     Shock  Pt initially presented with elevated lactic and elevated glucose, elevated serum osm but not quite to level of HHS. DKA workup negative. No pH for eval completed. Septic shock with source most likely R foot.     -endocrine following, appreciate recs  -stop diuresis, cont to hold   -monitor renal function   -podiatry c/s, appreciate recs, OR 6/22 for partial amputation       Anticoagulated  -cont warfarin per INR   -pharmacy assisting with dosing         Aramis Nava NP  Heart Transplant  [facility]

## 2025-06-24 NOTE — PROGRESS NOTES
[facility]  Cardiothoracic Surgery  Evaluation and Management/VAD interrogation       Patient Name: Rocco France  MRN: [id number]  Admission Date: 6/20/2025  Hospital Length of Stay: 4 days  Code Status: Full Code   Attending Physician: Mike Chauhan MD   Referring Provider: David Elder MD  Principal Problem:Type 2 diabetes mellitus    Subjective:     Post-Op Info:  Procedure(s) (LRB):  AMPUTATION, TOE, THROUGH METATARSAL HEAD (Right)   2 Days Post-Op     Subjective:     Post-Op Info:  Procedure(s) (LRB):  AMPUTATION, TOE, THROUGH METATARSAL HEAD (Right)   2 Days Post-Op      Reason for Visit:  Patient is seen in follow up management of lvad    Interval History: Feeling good today, ID remains following with escalating antimicrobial coverage    Medications:  Continuous Infusions:  Scheduled Meds:   amLODIPine  10 mg Oral Daily    atorvastatin  80 mg Oral Daily    cefTRIAXone (Rocephin) IV (PEDS and ADULTS)  2 g Intravenous Q24H    insulin aspart U-100  8 Units Subcutaneous TIDWM    insulin glargine U-100  24 Units Subcutaneous Daily    lactulose  30 g Oral TID    magnesium citrate  296 mL Oral Once    mirtazapine  30 mg Oral QHS    mupirocin   Nasal BID    polyethylene glycol  17 g Oral Daily    spironolactone  25 mg Oral Daily    vancomycin (VANCOCIN) IV (PEDS and ADULTS)  25 mg/kg Intravenous Once    warfarin  2.5 mg Oral Every Tues, Thurs, Sat, Sun    warfarin  5 mg Oral Every Mon, Wed, Fri     PRN Meds:  Current Facility-Administered Medications:     bisacodyL, 10 mg, Rectal, Daily PRN    chlorproMAZINE, 25 mg, Oral, TID PRN    dextrose 50%, 12.5 g, Intravenous, PRN    dextrose 50%, 25 g, Intravenous, PRN    fentaNYL, 25 mcg, Intravenous, Q5 Min PRN    glucagon (human recombinant), 1 mg, Intramuscular, PRN    glucose, 16 g, Oral, PRN    glucose, 24 g, Oral, PRN    insulin aspart U-100, 0-5 Units, Subcutaneous, QID (AC + HS) PRN    ondansetron, 4 mg, Intravenous, Daily PRN    Pharmacy  to dose Vancomycin consult, , , Once **AND** vancomycin - pharmacy to dose, , Intravenous, pharmacy to manage frequency     Objective:     Vital Signs (Most Recent):  Temp: 99 °F (37.2 °C) (06/24/25 1200)  Pulse: 90 (06/24/25 1200)  Resp: 18 (06/24/25 1200)  BP: (!) 86/0 (06/24/25 1200)  SpO2: 99 % (06/24/25 1200) Vital Signs (24h Range):  Temp:  [97.9 °F (36.6 °C)-99.1 °F (37.3 °C)] 99 °F (37.2 °C)  Pulse:  [] 90  Resp:  [18] 18  SpO2:  [95 %-99 %] 99 %  BP: ()/(0-72) 86/0       Intake/Output Summary (Last 24 hours) at 6/24/2025 1251  Last data filed at 6/24/2025 1051  Gross per 24 hour   Intake 422 ml   Output 800 ml   Net -378 ml       Physical Exam  HENT:      Head: Normocephalic.   Eyes:      Extraocular Movements: Extraocular movements intact.   Cardiovascular:      Rate and Rhythm: Normal rate and regular rhythm.      Comments: LVAD hum is smooth  Pulmonary:      Effort: Pulmonary effort is normal.      Breath sounds: Normal breath sounds.   Abdominal:      General: Abdomen is flat.      Palpations: Abdomen is soft.   Skin:     General: Skin is warm and dry.   Neurological:      General: No focal deficit present.         Significant Labs:  BMP:   Recent Labs   Lab 06/24/25  0258   *   *   K 4.2      CO2 24   BUN 27*   CREATININE 1.3   CALCIUM 8.8   MG 2.1     CBC:   Recent Labs   Lab 06/24/25  0258   WBC 13.49*   RBC 3.27*   HGB 9.4*   HCT 29.6*      MCV 91   MCH 28.7   MCHC 31.8*     CMP:   Recent Labs   Lab 06/23/25  0646 06/23/25  0851 06/24/25  0258   GLU  --    < > 143*   CALCIUM  --    < > 8.8   ALBUMIN 2.2*  --   --    PROT 9.0*  --   --    NA  --    < > 133*   K  --    < > 4.2   CO2  --    < > 24   CL  --    < > 100   BUN  --    < > 27*   CREATININE  --    < > 1.3   ALKPHOS 207*  --   --    ALT 15  --   --    AST 28  --   --    BILITOT 1.1*  --   --     < > = values in this interval not displayed.     Coagulation:   Recent Labs   Lab 06/24/25  0258   INR 1.6*    APTT 34.6*       Significant Diagnostics:  I have reviewed and interpreted all pertinent imaging results/findings within the past 24 hours.    Procedure: Device Interrogation Including analysis of device parameters  Current Settings: Ventricular Assist Device  Review of device function is stable      6/24/2025    12:00 PM 6/24/2025     8:00 AM 6/24/2025     5:02 AM 6/24/2025    12:09 AM 6/23/2025     8:02 PM 6/23/2025     4:00 PM 6/23/2025    11:45 AM   TXP LVAD INTERROGATIONS   Type HeartMate3 HeartMate3 HeartMate3 HeartMate3 HeartMate3 HeartMate3 HeartMate3   Flow 4.4 4.2 4.2 4.4 4.5 4.6 4.4   Speed 5200 5200 5250 5200 5200 5200 5200   PI 6.8 6.2 5.5 5.6 6.9 5.7 6.1   Power (Edwards) 3.6 3.5 3.7 3.6 3.6 3.6 3.6   LSL 4800 4800 4800 4800 4800 4800 4800   Low Flow Alarm no no    no no   Pulsatility Intermittent pulse Intermittent pulse Pulse Pulse Intermittent pulse Pulse Intermittent pulse       Assessment/Plan:     LVAD (left ventricular assist device) present  - Admitted for right cerebral acute subdural hematoma and acute basal cistern subarachnoid hemorrhage  - Implant Date: 01/13/2021  - Speed: 5200  - Low Flow Events: None noted  - Interval History was obtained from team member during rounding today.  - VAD/SERGEI teaching was performed with patient.  - Mobilization/Physical Therapy is ongoing.  - Anticoagulation: Coumadin  - INR: 1.6  - Urine Output: 800, x 1 not measured  - BUN: 27  - Creat: 1.3  - LDH: 204  - WBC 13.49  - GNR in Bcx; source of infection found to be an infected toe   - Enterococcus species noted in blood cultures from 6/22  - Partial amputation with podiatry on 6/22   - Vancomycin/Rocephin per ID recommendations    More than 50 percent of the care dominated counseling and coordinating care with different team members. The VAD was interrogated and no significant findings were found in the history. All these findings are documented in the note above.          Suzanne Mariano NP  Cardiothoracic  Surgery  [facility]

## 2025-06-24 NOTE — ASSESSMENT & PLAN NOTE
"69M with h/o T2DM, HTN, CHF s/p HM3 implantation 2021 admitted 6/20 with generalized weakness, inability to "hold his urine" and hyperglycemia. Found to be febrile with toe wound. S/p ray amputation R toe  6/22, purulence noted. Bcx with e.coli (pan susceptible). Prox margin path/cultures pending (enterococcus ). Fevers resolved. Wbc trending down. On ceftriaxone, tolerating    Suspect toe infection was source of bacteremia, risk factor wet shoes. Appreciate podiatry assistance with source control. Quite possible foot infection polymicrobial, but awaiting final results of culture (so far enterococcus only, suspectibilities pending) . Needs at least 2 weeks of abx for bacteremia, but suspect he has residual infection in foot so will likely need 6 week course of abx (PO abx may be an option, TBD)    Recommendations:   - change ceftriaxone --> zosyn . Stop vancomycin. Monitor for reaction to zosyn (pt agreeable to challenge). If any issues tolerating can either change zosyn --> meropenem (to cover both the ecoli and enterococcus or ceftriaxone+vancomycin)  - f/u cultures (repeat bcx pending, foot cultures pending)      I have reviewed hospital notes from  HTS service and other specialty providers. I have also reviewed CBC, CMP/BMP,  cultures and imaging with my interpretation as documented.     "

## 2025-06-24 NOTE — PROGRESS NOTES
Pharmacokinetic Initial Assessment: IV Vancomycin    Assessment/Plan:    Initiate intravenous vancomycin with loading dose of 1750 mg once followed by a maintenance dose of vancomycin 1500mg IV every 24 hours  Desired empiric serum trough concentration is 15 to 20 mcg/mL  Draw vancomycin trough level 60 min prior to third dose on 6/26 at approximately 1000  Pharmacy will continue to follow and monitor vancomycin.      Please contact pharmacy with any questions regarding this assessment.     Thank you for the consult,   Emilerob Good       Patient brief summary:  Rocco France is a 69 y.o. male initiated on antimicrobial therapy with IV Vancomycin for treatment of suspected bone/joint infection    Drug Allergies:   Review of patient's allergies indicates:   Allergen Reactions    Pcn [penicillins] Hives       Actual Body Weight:   73.6 kg     Renal Function:   Estimated Creatinine Clearance: 55.8 mL/min (based on SCr of 1.3 mg/dL).,     Dialysis Method (if applicable):  N/A    CBC (last 72 hours):  Recent Labs   Lab Result Units 06/22/25  0733 06/23/25  0646 06/24/25  0258   WBC K/uL 12.23 12.64 13.49*   HGB gm/dL 9.9* 10.4* 9.4*   HCT % 30.6* 31.8* 29.6*   Platelet Count K/uL 195 220 225   Lymph % % 14.0* 13.6* 15.6*   Mono % % 14.5 14.6 12.5   Eos % % 2.2 2.1 2.3   Basophil % % 0.4 0.2 0.5       Metabolic Panel (last 72 hours):  Recent Labs   Lab Result Units 06/21/25  1339 06/21/25  1751 06/21/25  1942 06/22/25  0733 06/22/25  1808 06/23/25  0019 06/23/25  0646 06/23/25  0851 06/24/25  0258   Sodium mmol/L 130*  --  133* 133* 135* 134*  --  133* 133*   Potassium mmol/L 4.3  --  4.8 3.9 3.7 4.2  --  4.6 4.2   Chloride mmol/L 94*  --  96 98 98 98  --  98 100   CO2 mmol/L 23  --  24 25 26 26  --  27 24   Glucose mg/dL 314*  --  170* 184* 148* 124*  --  243* 143*   Glucose, UA   --  4+*  --   --   --   --   --   --   --    BUN mg/dL 43*  --  39* 33* 30* 29*  --  28* 27*   Creatinine mg/dL 1.6*  --  1.5* 1.2 1.3 1.2   "--  1.3 1.3   Albumin g/dL  --   --   --   --   --   --  2.2*  --   --    Bilirubin Total mg/dL  --   --   --   --   --   --  1.1*  --   --    ALP unit/L  --   --   --   --   --   --  207*  --   --    AST unit/L  --   --   --   --   --   --  28  --   --    ALT unit/L  --   --   --   --   --   --  15  --   --    Magnesium  mg/dL  --   --   --  2.1  --   --  2.1  --  2.1   Phosphorus Level mg/dL  --   --   --  2.1*  --   --  2.3*  --  2.8       Drug levels (last 3 results):  No results for input(s): "VANCOMYCINRA", "VANCORANDOM", "VANCOMYCINPE", "VANCOPEAK", "VANCOMYCINTR", "VANCOTROUGH" in the last 72 hours.    Microbiologic Results:  Microbiology Results (last 7 days)       Procedure Component Value Units Date/Time    Culture, Anaerobic [[id number]] Collected: 06/22/25 1450    Order Status: Completed Specimen: Bone from Toe, Right Foot Updated: 06/24/25 1055     Anaerobe Culture Culture In Progress    Blood culture [[id number]]  (Normal) Collected: 06/23/25 0646    Order Status: Completed Specimen: Blood from Peripheral, Antecubital, Right Updated: 06/24/25 0901     Blood Culture No Growth After 24 Hours    Aerobic culture [[id number]]  (Abnormal) Collected: 06/22/25 1450    Order Status: Completed Specimen: Bone from Toe, Right Foot Updated: 06/24/25 0719     CULTURE, AEROBIC Few Enterococcus species     Comment: Identification and susceptibility pending       Afb Culture Stain [[id number]] Collected: 06/22/25 1450    Order Status: Completed Specimen: Bone from Toe, Right Foot Updated: 06/23/25 1518     ACID FAST STAIN  No acid fast bacilli seen    Fungus culture [[id number]]  (Normal) Collected: 06/22/25 1450    Order Status: Completed Specimen: Bone from Toe, Right Foot Updated: 06/23/25 1009     Fungal Culture Culture In Progress    Gram stain [[id number]] Collected: 06/22/25 2349    Order Status: Completed Specimen: Bone from Toe, Right Foot Updated: 06/22/25 3304     GRAM STAIN No WBCs, epithelial cells or " organisms seen    AFB Culture & Smear [[id number]] Collected: 06/22/25 1450    Order Status: Resulted Specimen: Bone from Toe, Right Foot Updated: 06/22/25 1640    Culture, Anaerobe [[id number]]     Order Status: Sent Specimen: Skin     Aerobic culture [[id number]]     Order Status: Sent Specimen: Skin

## 2025-06-24 NOTE — CARE UPDATE
BG goal 140-180    -A1C   Lab Results   Component Value Date    HGBA1C 12.4 (H) 06/20/2025         -HOME REGIMEN: none     -INPATIENT REGIMEN: Lantus 24 units once daily, Novolog 8 units TID with meals and SSI    -GLUCOSE TREND FOR THE PAST 24HRS: 108-157    -NO HYPOGYCEMIAS NOTED     -Diet: Diet Consistent Carbohydrate 2000 Calories (up to 75 gm per meal)    -Steroids - n/a    -Tube Feeds - n/a      Remains in CICU. HM3. OR on 6/22 for right partial first toe amputation. POD 2. BG stable on current SQ insulin regimen.      Plan:  -Lantus 24 units once daily (20% dose increase; fasting BG above goal ranges)   -Novolog  8 units TID with meals (basal/prandial match) (HOLD while NPO)   Low Dose Correction Scale  BG monitoring ac/hs     Discharge planning:tbd    Endocrine to continue to follow    ** Please call Endocrine for any BG related issues **

## 2025-06-24 NOTE — PROGRESS NOTES
06/24/25 0005   Vital Signs   BP (!) 102/0   BP Location Left arm   BP Method Doppler   Patient Position Lying     Pt's doppler is high and unable to obtain a cuff pressure. Pt is pulsatile and no symptoms. Doppler reading matches his baseline SBP. MD Gillies notified and ok with BP.

## 2025-06-24 NOTE — PT/OT/SLP PROGRESS
"Occupational Therapy   Treatment    Name: Rocco France  MRN: [id number]  Admitting Diagnosis:  Type 2 diabetes mellitus  2 Days Post-Op    Recommendations:     Discharge Recommendations: Low Intensity Therapy  Discharge Equipment Recommendations:  walker, rolling  Barriers to discharge:  None    Assessment:     Rocco France is a 69 y.o. male with a medical diagnosis of Type 2 diabetes mellitus.  He presents with good participation with presented therapeutic interventions this date. Pt is currently not at her PLOF and would greatly benefit from continued skilled OT intervention in efforts to participate in meaningful occupations of choice at the highest level of independence.   Performance deficits affecting function are weakness, impaired endurance, impaired self care skills, impaired functional mobility, gait instability, impaired balance, pain, decreased lower extremity function, impaired cardiopulmonary response to activity, orthopedic precautions.     Rehab Prognosis:  Good; patient would benefit from acute skilled OT services to address these deficits and reach maximum level of function.       Plan:     Patient to be seen 4 x/week to address the above listed problems via self-care/home management, community/work re-entry, therapeutic activities, therapeutic exercises, neuromuscular re-education  Plan of Care Expires: 07/05/25  Plan of Care Reviewed with: patient    Subjective     Chief Complaint: none stated  Patient/Family Comments/goals: "I think I'm ready for an afternoon nap"  Pain/Comfort:  Pain Rating 1: 0/10  Pain Rating Post-Intervention 1: 0/10    Objective:     Communicated with: RN prior to session.  Patient found up in chair with Condom Catheter, telemetry, LVAD upon OT entry to room.    General Precautions: Standard, fall, aspiration, LVAD    Orthopedic Precautions:RLE weight bearing as tolerated (darco and short distances)  Braces:  (R darco shoe)  Respiratory Status: Room air     Occupational " Performance:     Bed Mobility:    Patient completed Scooting/Bridging with modified independence  Patient completed Sit to Supine with stand by assistance     Functional Mobility/Transfers:  Patient completed Bed <> Chair Transfer using Step Transfer technique with stand by assistance with no assistive device  Functional Mobility: deferred 2/2 pt refusal     Activities of Daily Living:  Lower Body Dressing: independence to doff/don darco shoe  Toileting: dependence [place] in place      [facility] 6 Click ADL: 22    Treatment & Education:  Pt educated on the following:  - role of OT and OT POC, including DC from OT. Pt verbalized understanding.  - importance of continued mobilization  - Safe transfer techniques and proper body mechanics for fall prevention and improved independence with functional transfers   - Importance of OOB activities to increase endurance and tolerance for increased participation in daily ADLs.    - All pt questions within OT scope of practice addressed, pt verbalized understanding.     Patient left HOB elevated with all lines intact and call button in reach    GOALS:   Multidisciplinary Problems       Occupational Therapy Goals          Problem: Occupational Therapy    Goal Priority Disciplines Outcome Interventions   Occupational Therapy Goal     OT, PT/OT Progressing    Description: Goals to be met by: 7/5/25     Patient will increase functional independence with ADLs by performing:    LE Dressing with Modified [place].  Grooming while standing at sink with Modified [place].  Toileting from toilet with Modified [place] for hygiene and clothing management.   Supine to sit with Modified [place].  Toilet transfer to toilet with Modified [place].                         DME Justifications:   Rocco's mobility limitation cannot be sufficiently resolved by the use of a cane. His functional mobility deficit can be sufficiently resolved with the use of a Rolling Walker. Patient's  mobility limitation significantly impairs their ability to participate in one of more activities of daily living.  The use of a RW will significantly improve the patient's ability to participate in MRADLS and the patient will use it on regular basis in the home.    Time Tracking:     OT Date of Treatment: 06/24/25  OT Start Time: 1340  OT Stop Time: 1352  OT Total Time (min): 12 min    Billable Minutes:Self Care/Home Management 12    OT/[place]: OT          6/24/2025

## 2025-06-24 NOTE — PROGRESS NOTES
06/24/2025  Eddie Conner    Current provider:  Mike Chauhan MD    Device interrogation:      6/24/2025     5:02 AM 6/24/2025    12:09 AM 6/23/2025     8:02 PM 6/23/2025     4:00 PM 6/23/2025    11:45 AM 6/23/2025     8:00 AM 6/23/2025     4:01 AM   TXP LVAD INTERROGATIONS   Type HeartMate3 HeartMate3 HeartMate3 HeartMate3 HeartMate3 HeartMate3 HeartMate3   Flow 4.2 4.4 4.5 4.6 4.4 4.4 4.4   Speed 5250 5200 5200 5200 5200 5200 5200   PI 5.5 5.6 6.9 5.7 6.1 5.8 5.6   Power (Edwards) 3.7 3.6 3.6 3.6 3.6 3.6 3.6   LSL 4800 4800 4800 4800 4800 4800 4800   Pulsatility Pulse Pulse Intermittent pulse Pulse Intermittent pulse Intermittent pulse Pulse          Rounded on Rocco France to ensure all mechanical assist device settings (IABP or VAD) were appropriate and all parameters were within limits.  I was able to ensure all back up equipment was present, the staff had no issues, and the Perfusion Department daily rounding was complete.      For implantable VADs: Interrogation of Ventricular assist device was performed with analysis of device parameters and review of device function. I have personally reviewed the interrogation findings and agree with findings as stated.     In emergency, the nursing units have been notified to contact the perfusion department either by:  Calling [id number] from 630am to 4pm Mon thru Fri, utilizing the On-Call Finder functionality of Epic and searching for Perfusion, or by contacting the hospital  from 4pm to 630am and on weekends and asking to speak with the perfusionist on call.    8:06 AM

## 2025-06-24 NOTE — ASSESSMENT & PLAN NOTE
- Admitted for right cerebral acute subdural hematoma and acute basal cistern subarachnoid hemorrhage  - Implant Date: 01/13/2021  - Speed: 5200  - Low Flow Events: None noted  - Interval History was obtained from team member during rounding today.  - VAD/SERGEI teaching was performed with patient.  - Mobilization/Physical Therapy is ongoing.  - Anticoagulation: Coumadin  - INR: 1.6  - Urine Output: 800, x 1 not measured  - BUN: 27  - Creat: 1.3  - LDH: 204  - WBC 13.49  - GNR in Bcx; source of infection found to be an infected toe   - Enterococcus species noted in blood cultures from 6/22  - Partial amputation with podiatry on 6/22   - Vancomycin/Rocephin per ID recommendations    More than 50 percent of the care dominated counseling and coordinating care with different team members. The VAD was interrogated and no significant findings were found in the history. All these findings are documented in the note above.

## 2025-06-24 NOTE — ASSESSMENT & PLAN NOTE
Procedure: Device Interrogation Including analysis of device parameters  Current Settings: Ventricular Assist Device  Review of device function is stable/unstable stable        6/24/2025     8:00 AM 6/24/2025     5:02 AM 6/24/2025    12:09 AM 6/23/2025     8:02 PM 6/23/2025     4:00 PM 6/23/2025    11:45 AM 6/23/2025     8:00 AM   TXP LVAD INTERROGATIONS   Type HeartMate3 HeartMate3 HeartMate3 HeartMate3 HeartMate3 HeartMate3 HeartMate3   Flow 4.2 4.2 4.4 4.5 4.6 4.4 4.4   Speed 5200 5250 5200 5200 5200 5200 5200   PI 6.2 5.5 5.6 6.9 5.7 6.1 5.8   Power (Edwards) 3.5 3.7 3.6 3.6 3.6 3.6 3.6   LSL 4800 4800 4800 4800 4800 4800 4800   Pulsatility Intermittent pulse Pulse Pulse Intermittent pulse Pulse Intermittent pulse Intermittent pulse

## 2025-06-24 NOTE — SUBJECTIVE & OBJECTIVE
Interval History: naeo. Tolerating abx. Discussed h/o PCN allergy. Reports rash to PCN when he was little. Denies any airway swelling or severe penicillin reaction. Tolerating cephalosporin. Agreeable to challenge with zosyn    Review of Systems   Constitutional:  Negative for chills, fatigue and fever.   HENT:  Negative for ear pain, mouth sores, nosebleeds, postnasal drip, rhinorrhea, sinus pressure, sore throat, tinnitus, trouble swallowing and voice change.    Eyes:  Negative for photophobia, pain, redness and visual disturbance.   Respiratory:  Negative for apnea, cough, chest tightness, shortness of breath and wheezing.    Cardiovascular:  Negative for chest pain, palpitations and leg swelling.   Gastrointestinal:  Negative for abdominal pain, blood in stool, constipation, diarrhea, nausea and vomiting.   Endocrine: Negative for cold intolerance, heat intolerance, polydipsia and polyuria.   Genitourinary:  Negative for decreased urine volume, difficulty urinating, dysuria, flank pain, frequency, genital sores, hematuria, penile discharge, penile pain, penile swelling, scrotal swelling, testicular pain and urgency.   Musculoskeletal:  Negative for arthralgias, back pain, joint swelling, myalgias and neck pain.   Skin:  Positive for wound. Negative for color change and rash.   Allergic/Immunologic: Negative for environmental allergies and food allergies.   Neurological:  Negative for dizziness, seizures, syncope, weakness, light-headedness, numbness and headaches.   Hematological:  Negative for adenopathy. Does not bruise/bleed easily.   Psychiatric/Behavioral:  Negative for agitation, confusion, decreased concentration, hallucinations, self-injury, sleep disturbance and suicidal ideas. The patient is not nervous/anxious.      Objective:     Vital Signs (Most Recent):  Temp: 99 °F (37.2 °C) (06/24/25 1200)  Pulse: 90 (06/24/25 1200)  Resp: 18 (06/24/25 1200)  BP: (!) 86/0 (06/24/25 1200)  SpO2: 99 % (06/24/25  1200) Vital Signs (24h Range):  Temp:  [97.9 °F (36.6 °C)-99.1 °F (37.3 °C)] 99 °F (37.2 °C)  Pulse:  [] 90  Resp:  [18] 18  SpO2:  [95 %-99 %] 99 %  BP: ()/(0-72) 86/0     Weight: 73.6 kg (162 lb 4.1 oz)  Body mass index is 20.83 kg/m².    Estimated Creatinine Clearance: 55.8 mL/min (based on SCr of 1.3 mg/dL).     Physical Exam  Vitals and nursing note reviewed. Exam conducted with a chaperone present.   Constitutional:       Appearance: Normal appearance.   HENT:      Head: Normocephalic and atraumatic.   Eyes:      General: No scleral icterus.        Right eye: No discharge.         Left eye: No discharge.      Conjunctiva/sclera: Conjunctivae normal.   Cardiovascular:      Rate and Rhythm: Normal rate and regular rhythm.      Pulses: Normal pulses.      Heart sounds: No murmur heard.     Comments: VAD Humming sounds  Pulmonary:      Effort: Pulmonary effort is normal. No respiratory distress.      Breath sounds: Normal breath sounds. No stridor. No wheezing or rhonchi.   Abdominal:      General: There is no distension.      Palpations: Abdomen is soft.      Tenderness: There is no abdominal tenderness.      Comments: DLES covered. No tenderness to palpation.   Musculoskeletal:      Comments: Right foot covered with compression bandage.   Skin:     General: Skin is warm and dry.   Neurological:      General: No focal deficit present.      Mental Status: He is alert and oriented to person, place, and time.          Significant Labs: CBC:   Recent Labs   Lab 06/23/25  0646 06/24/25  0258   WBC 12.64 13.49*   HGB 10.4* 9.4*   HCT 31.8* 29.6*    225     CMP:   Recent Labs   Lab 06/23/25  0019 06/23/25  0646 06/23/25  0851 06/24/25  0258   *  --  133* 133*   K 4.2  --  4.6 4.2   CL 98  --  98 100   CO2 26  --  27 24   *  --  243* 143*   BUN 29*  --  28* 27*   CREATININE 1.2  --  1.3 1.3   CALCIUM 9.0  --  9.0 8.8   PROT  --  9.0*  --   --    ALBUMIN  --  2.2*  --   --    BILITOT  --   1.1*  --   --    ALKPHOS  --  207*  --   --    AST  --  28  --   --    ALT  --  15  --   --    ANIONGAP 10  --  8 9     Microbiology Results (last 7 days)       Procedure Component Value Units Date/Time    Culture, Anaerobic [[id number]] Collected: 06/22/25 1450    Order Status: Completed Specimen: Bone from Toe, Right Foot Updated: 06/24/25 1055     Anaerobe Culture Culture In Progress    Blood culture [[id number]]  (Normal) Collected: 06/23/25 0646    Order Status: Completed Specimen: Blood from Peripheral, Antecubital, Right Updated: 06/24/25 0901     Blood Culture No Growth After 24 Hours    Aerobic culture [[id number]]  (Abnormal) Collected: 06/22/25 1450    Order Status: Completed Specimen: Bone from Toe, Right Foot Updated: 06/24/25 0719     CULTURE, AEROBIC Few Enterococcus species     Comment: Identification and susceptibility pending       Afb Culture Stain [[id number]] Collected: 06/22/25 1450    Order Status: Completed Specimen: Bone from Toe, Right Foot Updated: 06/23/25 1518     ACID FAST STAIN  No acid fast bacilli seen    Fungus culture [[id number]]  (Normal) Collected: 06/22/25 1450    Order Status: Completed Specimen: Bone from Toe, Right Foot Updated: 06/23/25 1009     Fungal Culture Culture In Progress    Gram stain [[id number]] Collected: 06/22/25 1450    Order Status: Completed Specimen: Bone from Toe, Right Foot Updated: 06/22/25 1857     GRAM STAIN No WBCs, epithelial cells or organisms seen    AFB Culture & Smear [[id number]] Collected: 06/22/25 1450    Order Status: Resulted Specimen: Bone from Toe, Right Foot Updated: 06/22/25 1640    Culture, Anaerobe [[id number]]     Order Status: Sent Specimen: Skin     Aerobic culture [[id number]]     Order Status: Sent Specimen: Skin             Significant Imaging: I have reviewed all pertinent imaging results/findings within the past 24 hours.

## 2025-06-24 NOTE — PROGRESS NOTES
"[facility] - Cardiology Stepdown  Infectious Disease  Progress Note    Patient Name: Rocco France  MRN: [id number]  Admission Date: 6/20/2025  Length of Stay: 4 days  Attending Physician: Mike Chauhan MD  Primary Care Provider: Jay Guardado DO    Isolation Status: No active isolations  Assessment/Plan:      ID  E coli bacteremia  69M with h/o T2DM, HTN, CHF s/p HM3 implantation 2021 admitted 6/20 with generalized weakness, inability to "hold his urine" and hyperglycemia. Found to be febrile with toe wound. S/p ray amputation R toe  6/22, purulence noted. Bcx with e.coli (pan susceptible). Prox margin path/cultures pending (enterococcus ). Fevers resolved. Wbc trending down. On ceftriaxone, tolerating    Suspect toe infection was source of bacteremia, risk factor wet shoes. Appreciate podiatry assistance with source control. Quite possible foot infection polymicrobial, but awaiting final results of culture (so far enterococcus only, suspectibilities pending) . Needs at least 2 weeks of abx for bacteremia, but suspect he has residual infection in foot so will likely need 6 week course of abx (PO abx may be an option, TBD)    Recommendations:   - change ceftriaxone --> zosyn . Stop vancomycin. Monitor for reaction to zosyn (pt agreeable to challenge). If any issues tolerating can either change zosyn --> meropenem (to cover both the ecoli and enterococcus or ceftriaxone+vancomycin)  - f/u cultures (repeat bcx pending, foot cultures pending)      I have reviewed hospital notes from  HTS service and other specialty providers. I have also reviewed CBC, CMP/BMP,  cultures and imaging with my interpretation as documented.           Thank you for your consult. I will follow-up with patient. Please contact us if you have any additional questions.    Yun Siegel MD  Infectious Disease  [facility] - [facility] Step[facility]    Subjective:     Principal Problem:Type 2 diabetes mellitus    HPI: A 69-year-old man with NICM, " CAD, combined HF, Afib, s/p ICD, s/p LVAD on 1/13/21, DM2, subdural hematoma with subarachnoid hemorrhage in 2022 who presented to a local ED with generalized weakness. This was associated with three falls. EMS services found the patient to be hyperglycemic with blood sugar level noted to be over 500. On evaluation in the ED he was noted to be febrile to 101.1 F. This was associated with dysuria and hesitancy.  [facility] and started on cefepime plus vancomycin. Admission blood cultures on 6/19 are now positive for GNR identified as E coli on BCID.      Infection History:  October 4, 2021- DLI due to P aeruginosa, empiric Cipro plus doxycycline.  Interval History: naeo. Tolerating abx. Discussed h/o PCN allergy. Reports rash to PCN when he was little. Denies any airway swelling or severe penicillin reaction. Tolerating cephalosporin. Agreeable to challenge with zosyn    Review of Systems   Constitutional:  Negative for chills, fatigue and fever.   HENT:  Negative for ear pain, mouth sores, nosebleeds, postnasal drip, rhinorrhea, sinus pressure, sore throat, tinnitus, trouble swallowing and voice change.    Eyes:  Negative for photophobia, pain, redness and visual disturbance.   Respiratory:  Negative for apnea, cough, chest tightness, shortness of breath and wheezing.    Cardiovascular:  Negative for chest pain, palpitations and leg swelling.   Gastrointestinal:  Negative for abdominal pain, blood in stool, constipation, diarrhea, nausea and vomiting.   Endocrine: Negative for cold intolerance, heat intolerance, polydipsia and polyuria.   Genitourinary:  Negative for decreased urine volume, difficulty urinating, dysuria, flank pain, frequency, genital sores, hematuria, penile discharge, penile pain, penile swelling, scrotal swelling, testicular pain and urgency.   Musculoskeletal:  Negative for arthralgias, back pain, joint swelling, myalgias and neck pain.   Skin:  Positive for wound. Negative for color  change and rash.   Allergic/Immunologic: Negative for environmental allergies and food allergies.   Neurological:  Negative for dizziness, seizures, syncope, weakness, light-headedness, numbness and headaches.   Hematological:  Negative for adenopathy. Does not bruise/bleed easily.   Psychiatric/Behavioral:  Negative for agitation, confusion, decreased concentration, hallucinations, self-injury, sleep disturbance and suicidal ideas. The patient is not nervous/anxious.      Objective:     Vital Signs (Most Recent):  Temp: 99 °F (37.2 °C) (06/24/25 1200)  Pulse: 90 (06/24/25 1200)  Resp: 18 (06/24/25 1200)  BP: (!) 86/0 (06/24/25 1200)  SpO2: 99 % (06/24/25 1200) Vital Signs (24h Range):  Temp:  [97.9 °F (36.6 °C)-99.1 °F (37.3 °C)] 99 °F (37.2 °C)  Pulse:  [] 90  Resp:  [18] 18  SpO2:  [95 %-99 %] 99 %  BP: ()/(0-72) 86/0     Weight: 73.6 kg (162 lb 4.1 oz)  Body mass index is 20.83 kg/m².    Estimated Creatinine Clearance: 55.8 mL/min (based on SCr of 1.3 mg/dL).     Physical Exam  Vitals and nursing note reviewed. Exam conducted with a chaperone present.   Constitutional:       Appearance: Normal appearance.   HENT:      Head: Normocephalic and atraumatic.   Eyes:      General: No scleral icterus.        Right eye: No discharge.         Left eye: No discharge.      Conjunctiva/sclera: Conjunctivae normal.   Cardiovascular:      Rate and Rhythm: Normal rate and regular rhythm.      Pulses: Normal pulses.      Heart sounds: No murmur heard.     Comments: VAD Humming sounds  Pulmonary:      Effort: Pulmonary effort is normal. No respiratory distress.      Breath sounds: Normal breath sounds. No stridor. No wheezing or rhonchi.   Abdominal:      General: There is no distension.      Palpations: Abdomen is soft.      Tenderness: There is no abdominal tenderness.      Comments: DLES covered. No tenderness to palpation.   Musculoskeletal:      Comments: Right foot covered with compression bandage.   Skin:      General: Skin is warm and dry.   Neurological:      General: No focal deficit present.      Mental Status: He is alert and oriented to person, place, and time.          Significant Labs: CBC:   Recent Labs   Lab 06/23/25  0646 06/24/25  0258   WBC 12.64 13.49*   HGB 10.4* 9.4*   HCT 31.8* 29.6*    225     CMP:   Recent Labs   Lab 06/23/25  0019 06/23/25  0646 06/23/25  0851 06/24/25  0258   *  --  133* 133*   K 4.2  --  4.6 4.2   CL 98  --  98 100   CO2 26  --  27 24   *  --  243* 143*   BUN 29*  --  28* 27*   CREATININE 1.2  --  1.3 1.3   CALCIUM 9.0  --  9.0 8.8   PROT  --  9.0*  --   --    ALBUMIN  --  2.2*  --   --    BILITOT  --  1.1*  --   --    ALKPHOS  --  207*  --   --    AST  --  28  --   --    ALT  --  15  --   --    ANIONGAP 10  --  8 9     Microbiology Results (last 7 days)       Procedure Component Value Units Date/Time    Culture, Anaerobic [[id number]] Collected: 06/22/25 1450    Order Status: Completed Specimen: Bone from Toe, Right Foot Updated: 06/24/25 1055     Anaerobe Culture Culture In Progress    Blood culture [[id number]]  (Normal) Collected: 06/23/25 0646    Order Status: Completed Specimen: Blood from Peripheral, Antecubital, Right Updated: 06/24/25 0901     Blood Culture No Growth After 24 Hours    Aerobic culture [[id number]]  (Abnormal) Collected: 06/22/25 1450    Order Status: Completed Specimen: Bone from Toe, Right Foot Updated: 06/24/25 0719     CULTURE, AEROBIC Few Enterococcus species     Comment: Identification and susceptibility pending       Afb Culture Stain [[id number]] Collected: 06/22/25 1450    Order Status: Completed Specimen: Bone from Toe, Right Foot Updated: 06/23/25 1518     ACID FAST STAIN  No acid fast bacilli seen    Fungus culture [[id number]]  (Normal) Collected: 06/22/25 1450    Order Status: Completed Specimen: Bone from Toe, Right Foot Updated: 06/23/25 1009     Fungal Culture Culture In Progress    Gram stain [[id number]] Collected:  06/22/25 1450    Order Status: Completed Specimen: Bone from Toe, Right Foot Updated: 06/22/25 1857     GRAM STAIN No WBCs, epithelial cells or organisms seen    AFB Culture & Smear [[id number]] Collected: 06/22/25 1450    Order Status: Resulted Specimen: Bone from Toe, Right Foot Updated: 06/22/25 1640    Culture, Anaerobe [[id number]]     Order Status: Sent Specimen: Skin     Aerobic culture [[id number]]     Order Status: Sent Specimen: Skin             Significant Imaging: I have reviewed all pertinent imaging results/findings within the past 24 hours.

## 2025-06-24 NOTE — SUBJECTIVE & OBJECTIVE
Subjective:     Post-Op Info:  Procedure(s) (LRB):  AMPUTATION, TOE, THROUGH METATARSAL HEAD (Right)   2 Days Post-Op      Reason for Visit:  Patient is seen in follow up management of lvad    Interval History: Feeling good today, ID remains following with escalating antimicrobial coverage    Medications:  Continuous Infusions:  Scheduled Meds:   amLODIPine  10 mg Oral Daily    atorvastatin  80 mg Oral Daily    cefTRIAXone (Rocephin) IV (PEDS and ADULTS)  2 g Intravenous Q24H    insulin aspart U-100  8 Units Subcutaneous TIDWM    insulin glargine U-100  24 Units Subcutaneous Daily    lactulose  30 g Oral TID    magnesium citrate  296 mL Oral Once    mirtazapine  30 mg Oral QHS    mupirocin   Nasal BID    polyethylene glycol  17 g Oral Daily    spironolactone  25 mg Oral Daily    vancomycin (VANCOCIN) IV (PEDS and ADULTS)  25 mg/kg Intravenous Once    warfarin  2.5 mg Oral Every Tues, Thurs, Sat, Sun    warfarin  5 mg Oral Every Mon, Wed, Fri     PRN Meds:  Current Facility-Administered Medications:     bisacodyL, 10 mg, Rectal, Daily PRN    chlorproMAZINE, 25 mg, Oral, TID PRN    dextrose 50%, 12.5 g, Intravenous, PRN    dextrose 50%, 25 g, Intravenous, PRN    fentaNYL, 25 mcg, Intravenous, Q5 Min PRN    glucagon (human recombinant), 1 mg, Intramuscular, PRN    glucose, 16 g, Oral, PRN    glucose, 24 g, Oral, PRN    insulin aspart U-100, 0-5 Units, Subcutaneous, QID (AC + HS) PRN    ondansetron, 4 mg, Intravenous, Daily PRN    Pharmacy to dose Vancomycin consult, , , Once **AND** vancomycin - pharmacy to dose, , Intravenous, pharmacy to manage frequency     Objective:     Vital Signs (Most Recent):  Temp: 99 °F (37.2 °C) (06/24/25 1200)  Pulse: 90 (06/24/25 1200)  Resp: 18 (06/24/25 1200)  BP: (!) 86/0 (06/24/25 1200)  SpO2: 99 % (06/24/25 1200) Vital Signs (24h Range):  Temp:  [97.9 °F (36.6 °C)-99.1 °F (37.3 °C)] 99 °F (37.2 °C)  Pulse:  [] 90  Resp:  [18] 18  SpO2:  [95 %-99 %] 99 %  BP: ()/(0-72)  86/0       Intake/Output Summary (Last 24 hours) at 6/24/2025 1251  Last data filed at 6/24/2025 1051  Gross per 24 hour   Intake 422 ml   Output 800 ml   Net -378 ml       Physical Exam  HENT:      Head: Normocephalic.   Eyes:      Extraocular Movements: Extraocular movements intact.   Cardiovascular:      Rate and Rhythm: Normal rate and regular rhythm.      Comments: LVAD hum is smooth  Pulmonary:      Effort: Pulmonary effort is normal.      Breath sounds: Normal breath sounds.   Abdominal:      General: Abdomen is flat.      Palpations: Abdomen is soft.   Skin:     General: Skin is warm and dry.   Neurological:      General: No focal deficit present.         Significant Labs:  BMP:   Recent Labs   Lab 06/24/25 0258   *   *   K 4.2      CO2 24   BUN 27*   CREATININE 1.3   CALCIUM 8.8   MG 2.1     CBC:   Recent Labs   Lab 06/24/25 0258   WBC 13.49*   RBC 3.27*   HGB 9.4*   HCT 29.6*      MCV 91   MCH 28.7   MCHC 31.8*     CMP:   Recent Labs   Lab 06/23/25  0646 06/23/25  0851 06/24/25 0258   GLU  --    < > 143*   CALCIUM  --    < > 8.8   ALBUMIN 2.2*  --   --    PROT 9.0*  --   --    NA  --    < > 133*   K  --    < > 4.2   CO2  --    < > 24   CL  --    < > 100   BUN  --    < > 27*   CREATININE  --    < > 1.3   ALKPHOS 207*  --   --    ALT 15  --   --    AST 28  --   --    BILITOT 1.1*  --   --     < > = values in this interval not displayed.     Coagulation:   Recent Labs   Lab 06/24/25 0258   INR 1.6*   APTT 34.6*       Significant Diagnostics:  I have reviewed and interpreted all pertinent imaging results/findings within the past 24 hours.    Procedure: Device Interrogation Including analysis of device parameters  Current Settings: Ventricular Assist Device  Review of device function is stable      6/24/2025    12:00 PM 6/24/2025     8:00 AM 6/24/2025     5:02 AM 6/24/2025    12:09 AM 6/23/2025     8:02 PM 6/23/2025     4:00 PM 6/23/2025    11:45 AM   TXP LVAD INTERROGATIONS   Type  HeartMate3 HeartMate3 HeartMate3 HeartMate3 HeartMate3 HeartMate3 HeartMate3   Flow 4.4 4.2 4.2 4.4 4.5 4.6 4.4   Speed 5200 5200 5250 5200 5200 5200 5200   PI 6.8 6.2 5.5 5.6 6.9 5.7 6.1   Power ([place]) 3.6 3.5 3.7 3.6 3.6 3.6 3.6   LSL 4800 4800 4800 4800 4800 4800 4800   Low Flow Alarm no no    no no   Pulsatility Intermittent pulse Intermittent pulse Pulse Pulse Intermittent pulse Pulse Intermittent pulse

## 2025-06-24 NOTE — PLAN OF CARE
Brief ID note    Enterococcus growing from bone in foot, susceptibility pending. Ceftriaxone alone is not adequate coverage. Adding vancomycin for now. Will clarify PCN allergy and f/u susceptibilities     Aerobic culture [[id number]] (Abnormal) Collected: 06/22/25 1450   Order Status: Completed Specimen: Bone from Toe, Right Foot Updated: 06/24/25 0719    CULTURE, AEROBIC Few Enterococcus species Abnormal     Comment: Identification and susceptibility pending

## 2025-06-24 NOTE — PT/OT/SLP PROGRESS
Physical Therapy Treatment    Patient Name:  Rocco France   MRN:  [id number]    Recommendations:     Discharge Recommendations: Low Intensity Therapy  Discharge Equipment Recommendations: walker, rolling  Barriers to discharge: None    Assessment:     Rocco France is a 69 y.o. male admitted with a medical diagnosis of Type 2 diabetes mellitus.  He presents with the following impairments/functional limitations: weakness, impaired endurance, impaired self care skills, impaired functional mobility, gait instability, impaired balance, decreased lower extremity function     Pt agreeable and tolerated PT treatment well. Pt found on BSC upon entry. Assisted pt with madan-care with pt able to stand with RW and CGA this session. Pt amb ~40 ft in the room with RW and CGA, without c/o pain in R foot. Pt stayed connected to wall power throughout session. Patient continues to demonstrate the need for low intensity therapy on a scheduled basis exhibited by decreased independence with self-care and functional mobility.    Rehab Prognosis: Good; patient would benefit from acute skilled PT services to address these deficits and reach maximum level of function.    Recent Surgery: Procedure(s) (LRB):  AMPUTATION, TOE, THROUGH METATARSAL HEAD (Right) 2 Days Post-Op    Plan:     During this hospitalization, patient to be seen 4 x/week to address the identified rehab impairments via gait training, therapeutic activities, therapeutic exercises, neuromuscular re-education and progress toward the following goals:    Plan of Care Expires:  07/23/25    Subjective     Chief Complaint: none reported  Patient/Family Comments/goals: to get better  Pain/Comfort:  Pain Rating 1: 0/10  Pain Rating Post-Intervention 1: 0/10      Objective:     Communicated with RN prior to session.  Patient found sitting on BSC with peripheral IV, Condom Catheter, LVAD upon PT entry to room.     General Precautions: Standard, fall, aspiration, LVAD  Orthopedic  Precautions: RLE weight bearing as tolerated (darco shoe and short distances)  Braces:  (R darco shoe)  Respiratory Status: Room air     Functional Mobility:    Bed Mobility:   N/T 2/2 pt sitting up on BSC upon PT arrival     Transfers:   Sit <> Stand Transfer: contact guard assistance from bedside commode using rolling walker     Balance:   Standing balance:   FAIR: Maintains without assist but unable to take challenges  FAIR: Needs CONTACT GUARD during gait                 Gait:  Distance: ~40 ft in the room   Assistive Device: RW  Assistance Level: contact guard assistance  Gait Assessment: Pt amb with decreased yayo using step to gait pattern. No LOB. Verbal cues for RW management.        AM-PAC 6 CLICK MOBILITY  Turning over in bed (including adjusting bedclothes, sheets and blankets)?: 4  Sitting down on and standing up from a chair with arms (e.g., wheelchair, bedside commode, etc.): 3  Moving from lying on back to sitting on the side of the bed?: 4  Moving to and from a bed to a chair (including a wheelchair)?: 3  Need to walk in hospital room?: 3  Climbing 3-5 steps with a railing?: 2  Basic Mobility Total Score: 19       Treatment & Education:  Pt educated on tip to reduce fall risk and safety with mobility and using call button for assistance from nursing staff with OOB mobility.  Pt educated on sitting up in chair 2-4 hours of the day  All questions answered within the scope of PT.  White board updated accordingly.    Patient left up in chair with all lines intact and call button in reach..    GOALS:   Multidisciplinary Problems       Physical Therapy Goals          Problem: Physical Therapy    Goal Priority Disciplines Outcome Interventions   Physical Therapy Goal     PT, PT/OT Progressing    Description: Goals to be met by: 25     Patient will increase functional independence with mobility by performin. Sit to stand transfer with Modified [place]  2. Bed to chair transfer with Stand  by assistance using LRAD or no AD  3. Gait  x 150 feet with Stand by assistance using LRAD or no AD.   4. Ascend/descend 3 stair with no Handrails Minimum assistance using LRAD or no AD.                          DME Justifications:   Rocco's mobility limitation cannot be sufficiently resolved by the use of a cane. His functional mobility deficit can be sufficiently resolved with the use of a Rolling Walker. Patient's mobility limitation significantly impairs their ability to participate in one of more activities of daily living.  The use of a RW will significantly improve the patient's ability to participate in MRADLS and the patient will use it on regular basis in the home.    Time Tracking:     PT Received On: 06/24/25  PT Start Time: 1303     PT Stop Time: 1318  PT Total Time (min): 15 min     Billable Minutes: Gait Training 15    Treatment Type: Treatment  PT/PTA: PT     Number of PTA visits since last PT visit: 0     06/24/2025

## 2025-06-24 NOTE — PLAN OF CARE
Pt free of falls and injury. Pt denies any pain or discomfort. Pt AAOx4. BG monitored. Fall precautions remain in place. LVAD hum present and smooth. VAD numbers and dopplers WDL. DLES dressing CDI. Plan of care reviewed with pt.       Problem: Adult Inpatient Plan of Care  Goal: Plan of Care Review  Outcome: Progressing  Goal: Patient-Specific Goal (Individualized)  Outcome: Progressing  Goal: Optimal Comfort and Wellbeing  Outcome: Progressing     Problem: Diabetes Comorbidity  Goal: Blood Glucose Level Within Targeted Range  Outcome: Progressing     Problem: Sepsis/Septic Shock  Goal: Absence of Infection Signs and Symptoms  Outcome: Progressing     Problem: Ventricular Assist Device  Goal: Absence of Bleeding  Outcome: Progressing  Goal: Absence of Embolism Signs and Symptoms  Outcome: Progressing  Goal: Absence of Infection Signs and Symptoms  Outcome: Progressing     Problem: Wound  Goal: Skin Health and Integrity  Outcome: Progressing  Goal: Optimal Wound Healing  Outcome: Progressing     Problem: Fall Injury Risk  Goal: Absence of Fall and Fall-Related Injury  Outcome: Progressing

## 2025-06-24 NOTE — NURSING
Notified Sofi.PA BG 92 before dinner, and pt had <50% dinner,and Sofi FOFANA is ok to hold scheduled insulin.

## 2025-06-24 NOTE — SUBJECTIVE & OBJECTIVE
Interval History: Working with PT/ot post toe amputation. Pending ID recs for home antibiotics (IV vs PO). Vancomycin added today for enterococcus coverage.         Continuous Infusions:  Scheduled Meds:   amLODIPine  10 mg Oral Daily    atorvastatin  80 mg Oral Daily    cefTRIAXone (Rocephin) IV (PEDS and ADULTS)  2 g Intravenous Q24H    insulin aspart U-100  8 Units Subcutaneous TIDWM    insulin glargine U-100  24 Units Subcutaneous Daily    lactulose  30 g Oral TID    magnesium citrate  296 mL Oral Once    mirtazapine  30 mg Oral QHS    mupirocin   Nasal BID    polyethylene glycol  17 g Oral Daily    spironolactone  25 mg Oral Daily    vancomycin (VANCOCIN) IV (PEDS and ADULTS)  25 mg/kg Intravenous Once    warfarin  2.5 mg Oral Every Tues, Thurs, Sat, Sun    warfarin  5 mg Oral Every Mon, Wed, Fri     PRN Meds:  Current Facility-Administered Medications:     bisacodyL, 10 mg, Rectal, Daily PRN    chlorproMAZINE, 25 mg, Oral, TID PRN    dextrose 50%, 12.5 g, Intravenous, PRN    dextrose 50%, 25 g, Intravenous, PRN    fentaNYL, 25 mcg, Intravenous, Q5 Min PRN    glucagon (human recombinant), 1 mg, Intramuscular, PRN    glucose, 16 g, Oral, PRN    glucose, 24 g, Oral, PRN    insulin aspart U-100, 0-5 Units, Subcutaneous, QID (AC + HS) PRN    ondansetron, 4 mg, Intravenous, Daily PRN    Pharmacy to dose Vancomycin consult, , , Once **AND** vancomycin - pharmacy to dose, , Intravenous, pharmacy to manage frequency    Review of patient's allergies indicates:   Allergen Reactions    Pcn [penicillins] Hives     Objective:     Vital Signs (Most Recent):  Temp: 97.9 °F (36.6 °C) (06/24/25 0750)  Pulse: 90 (06/24/25 1124)  Resp: 18 (06/24/25 0750)  BP: 113/72 (06/24/25 0805)  SpO2: 98 % (06/24/25 1000) Vital Signs (24h Range):  Temp:  [97.9 °F (36.6 °C)-99.1 °F (37.3 °C)] 97.9 °F (36.6 °C)  Pulse:  [] 90  Resp:  [18] 18  SpO2:  [95 %-98 %] 98 %  BP: ()/(0-72) 113/72     Patient Vitals for the past 72 hrs (Last  3 readings):   Weight   06/24/25 0900 73.6 kg (162 lb 4.1 oz)   06/23/25 1800 73.4 kg (161 lb 13.1 oz)     Body mass index is 20.83 kg/m².      Intake/Output Summary (Last 24 hours) at 6/24/2025 1128  Last data filed at 6/24/2025 0651  Gross per 24 hour   Intake 644 ml   Output 800 ml   Net -156 ml       Hemodynamic Parameters:       Telemetry: reviewed       Physical Exam  Vitals reviewed.   Constitutional:       Appearance: He is normal weight.   HENT:      Head: Normocephalic and atraumatic.      Right Ear: External ear normal.      Left Ear: External ear normal.      Nose: Nose normal.      Mouth/Throat:      Mouth: Mucous membranes are moist.      Pharynx: Oropharynx is clear.   Eyes:      Conjunctiva/sclera: Conjunctivae normal.   Cardiovascular:      Rate and Rhythm: Normal rate and regular rhythm.      Pulses: Normal pulses.   Pulmonary:      Effort: Pulmonary effort is normal.      Breath sounds: Normal breath sounds.   Abdominal:      General: Abdomen is flat. Bowel sounds are normal.      Palpations: Abdomen is soft.   Musculoskeletal:      Cervical back: Normal range of motion.      Right lower leg: No edema.      Left lower leg: No edema.   Skin:     General: Skin is warm.      Capillary Refill: Capillary refill takes less than 2 seconds.   Neurological:      Mental Status: He is alert. Mental status is at baseline.   Psychiatric:         Mood and Affect: Mood normal.         Behavior: Behavior normal.            Significant Labs:  CBC:  Recent Labs   Lab 06/22/25  0733 06/23/25  0646 06/24/25  0258   WBC 12.23 12.64 13.49*   RBC 3.48* 3.56* 3.27*   HGB 9.9* 10.4* 9.4*   HCT 30.6* 31.8* 29.6*    220 225   MCV 88 89 91   MCH 28.4 29.2 28.7   MCHC 32.4 32.7 31.8*     BNP:  Recent Labs   Lab 06/19/25  1751 06/20/25  0959 06/23/25  0646   BNP 3,655* 615* 270*     CMP:  Recent Labs   Lab 06/19/25  1751 06/20/25  0931 06/20/25  0959 06/20/25  1404 06/23/25  0019 06/23/25  0646 06/23/25  0851  06/24/25  0258   *   < >  --    < > 124*  --  243* 143*   CALCIUM 9.1   < >  --    < > 9.0  --  9.0 8.8   ALBUMIN 2.6*  --  2.6*  --   --  2.2*  --   --    PROT 9.6*  --  9.5*  --   --  9.0*  --   --    *   < >  --    < > 134*  --  133* 133*   K 4.4   < >  --    < > 4.2  --  4.6 4.2   CO2 25   < >  --    < > 26  --  27 24   CL 87*   < >  --    < > 98  --  98 100   BUN 32*   < >  --    < > 29*  --  28* 27*   CREATININE 1.9*   < >  --    < > 1.2  --  1.3 1.3   ALKPHOS 197*  --  216*  --   --  207*  --   --    ALT 9*  --  19  --   --  15  --   --    AST 32  --  42  --   --  28  --   --    BILITOT 2.6*  --  2.7*  --   --  1.1*  --   --     < > = values in this interval not displayed.      Coagulation:   Recent Labs   Lab 06/22/25  0733 06/23/25  0646 06/24/25 0258   INR 1.5* 1.5* 1.6*   APTT 34.3* 34.7* 34.6*     LDH:  Recent Labs   Lab 06/22/25  0733 06/23/25  0646 06/24/25  0258    204 204     Microbiology:  Microbiology Results (last 7 days)       Procedure Component Value Units Date/Time    Culture, Anaerobic [[id number]] Collected: 06/22/25 1450    Order Status: Completed Specimen: Bone from Toe, Right Foot Updated: 06/24/25 1055     Anaerobe Culture Culture In Progress    Blood culture [[id number]]  (Normal) Collected: 06/23/25 0646    Order Status: Completed Specimen: Blood from Peripheral, Antecubital, Right Updated: 06/24/25 0901     Blood Culture No Growth After 24 Hours    Aerobic culture [[id number]]  (Abnormal) Collected: 06/22/25 1450    Order Status: Completed Specimen: Bone from Toe, Right Foot Updated: 06/24/25 0719     CULTURE, AEROBIC Few Enterococcus species     Comment: Identification and susceptibility pending       Afb Culture Stain [[id number]] Collected: 06/22/25 1450    Order Status: Completed Specimen: Bone from Toe, Right Foot Updated: 06/23/25 1518     ACID FAST STAIN  No acid fast bacilli seen    Fungus culture [[id number]]  (Normal) Collected: 06/22/25 1450    Order  Status: Completed Specimen: Bone from Toe, Right Foot Updated: 06/23/25 1009     Fungal Culture Culture In Progress    Gram stain [[id number]] Collected: 06/22/25 1450    Order Status: Completed Specimen: Bone from Toe, Right Foot Updated: 06/22/25 1857     GRAM STAIN No WBCs, epithelial cells or organisms seen    AFB Culture & Smear [[id number]] Collected: 06/22/25 1450    Order Status: Resulted Specimen: Bone from Toe, Right Foot Updated: 06/22/25 1640    Culture, Anaerobe [[id number]]     Order Status: Sent Specimen: Skin     Aerobic culture [[id number]]     Order Status: Sent Specimen: Skin             I have reviewed all pertinent labs within the past 24 hours.    Estimated Creatinine Clearance: 55.8 mL/min (based on SCr of 1.3 mg/dL).    Diagnostic Results:  I have reviewed and interpreted all pertinent imaging results/findings within the past 24 hours.

## 2025-06-24 NOTE — PLAN OF CARE
VSS. BG monitored. LVAD number and doppler WNL. LVAD dressing CDI. Rt toe dressing CDI. Pt had large BM x2 during the day. Pt educated on fall risk and remained free from falls/trauma/injury. Denies chest pain, SOB, palpitations, dizziness, pain, or discomfort. Plan of care reviewed with pt, all questions answered. Bed locked in lowest position, call bell within reach, no acute distress noted, will continue to monitor.

## 2025-06-25 LAB
ABSOLUTE EOSINOPHIL (OHS): 0.37 K/UL
ABSOLUTE MONOCYTE (OHS): 1.45 K/UL (ref 0.3–1)
ABSOLUTE NEUTROPHIL COUNT (OHS): 8.79 K/UL (ref 1.8–7.7)
ALBUMIN SERPL BCP-MCNC: 2 G/DL (ref 3.5–5.2)
ALP SERPL-CCNC: 257 UNIT/L (ref 40–150)
ALT SERPL W/O P-5'-P-CCNC: 21 UNIT/L (ref 10–44)
ANION GAP (OHS): 9 MMOL/L (ref 8–16)
APTT PPP: 35.6 SECONDS (ref 21–32)
AST SERPL-CCNC: 47 UNIT/L (ref 11–45)
BACTERIA SPEC AEROBE CULT: ABNORMAL
BASOPHILS # BLD AUTO: 0.06 K/UL
BASOPHILS NFR BLD AUTO: 0.5 %
BILIRUB DIRECT SERPL-MCNC: 0.5 MG/DL (ref 0.1–0.3)
BILIRUB SERPL-MCNC: 0.9 MG/DL (ref 0.1–1)
BNP SERPL-MCNC: 424 PG/ML (ref 0–99)
BUN SERPL-MCNC: 22 MG/DL (ref 8–23)
CALCIUM SERPL-MCNC: 8.5 MG/DL (ref 8.7–10.5)
CHLORIDE SERPL-SCNC: 100 MMOL/L (ref 95–110)
CO2 SERPL-SCNC: 24 MMOL/L (ref 23–29)
CREAT SERPL-MCNC: 1.2 MG/DL (ref 0.5–1.4)
CRP SERPL-MCNC: 143.5 MG/L
ERYTHROCYTE [DISTWIDTH] IN BLOOD BY AUTOMATED COUNT: 14.6 % (ref 11.5–14.5)
GFR SERPLBLD CREATININE-BSD FMLA CKD-EPI: >60 ML/MIN/1.73/M2
GLUCOSE SERPL-MCNC: 125 MG/DL (ref 70–110)
HCT VFR BLD AUTO: 28.3 % (ref 40–54)
HGB BLD-MCNC: 9 GM/DL (ref 14–18)
IMM GRANULOCYTES # BLD AUTO: 0.15 K/UL (ref 0–0.04)
IMM GRANULOCYTES NFR BLD AUTO: 1.2 % (ref 0–0.5)
INR PPP: 1.6 (ref 0.8–1.2)
LDH SERPL-CCNC: 211 U/L (ref 110–260)
LYMPHOCYTES # BLD AUTO: 2.09 K/UL (ref 1–4.8)
MAGNESIUM SERPL-MCNC: 2.1 MG/DL (ref 1.6–2.6)
MCH RBC QN AUTO: 28.8 PG (ref 27–31)
MCHC RBC AUTO-ENTMCNC: 31.8 G/DL (ref 32–36)
MCV RBC AUTO: 91 FL (ref 82–98)
NUCLEATED RBC (/100WBC) (OHS): 0 /100 WBC
OHS QRS DURATION: 162 MS
OHS QTC CALCULATION: 515 MS
PHOSPHATE SERPL-MCNC: 2.1 MG/DL (ref 2.7–4.5)
PLATELET # BLD AUTO: 235 K/UL (ref 150–450)
PMV BLD AUTO: 11.3 FL (ref 9.2–12.9)
POCT GLUCOSE: 142 MG/DL (ref 70–110)
POCT GLUCOSE: 149 MG/DL (ref 70–110)
POCT GLUCOSE: 159 MG/DL (ref 70–110)
POCT GLUCOSE: 225 MG/DL (ref 70–110)
POCT GLUCOSE: 90 MG/DL (ref 70–110)
POTASSIUM SERPL-SCNC: 4.2 MMOL/L (ref 3.5–5.1)
PREALB SERPL-MCNC: 5 MG/DL (ref 20–43)
PROT SERPL-MCNC: 8.5 GM/DL (ref 6–8.4)
PROTHROMBIN TIME: 16.7 SECONDS (ref 9–12.5)
RBC # BLD AUTO: 3.12 M/UL (ref 4.6–6.2)
RELATIVE EOSINOPHIL (OHS): 2.9 %
RELATIVE LYMPHOCYTE (OHS): 16.2 % (ref 18–48)
RELATIVE MONOCYTE (OHS): 11.2 % (ref 4–15)
RELATIVE NEUTROPHIL (OHS): 68 % (ref 38–73)
SODIUM SERPL-SCNC: 133 MMOL/L (ref 136–145)
WBC # BLD AUTO: 12.91 K/UL (ref 3.9–12.7)

## 2025-06-25 PROCEDURE — 80053 COMPREHEN METABOLIC PANEL: CPT | Performed by: STUDENT IN AN ORGANIZED HEALTH CARE EDUCATION/TRAINING PROGRAM

## 2025-06-25 PROCEDURE — 25000003 PHARM REV CODE 250: Performed by: BEHAVIOR TECHNICIAN

## 2025-06-25 PROCEDURE — 63600175 PHARM REV CODE 636 W HCPCS: Performed by: NURSE PRACTITIONER

## 2025-06-25 PROCEDURE — 36415 COLL VENOUS BLD VENIPUNCTURE: CPT | Performed by: BEHAVIOR TECHNICIAN

## 2025-06-25 PROCEDURE — 85610 PROTHROMBIN TIME: CPT | Performed by: STUDENT IN AN ORGANIZED HEALTH CARE EDUCATION/TRAINING PROGRAM

## 2025-06-25 PROCEDURE — 25000003 PHARM REV CODE 250: Performed by: INTERNAL MEDICINE

## 2025-06-25 PROCEDURE — 83880 ASSAY OF NATRIURETIC PEPTIDE: CPT | Performed by: STUDENT IN AN ORGANIZED HEALTH CARE EDUCATION/TRAINING PROGRAM

## 2025-06-25 PROCEDURE — 86140 C-REACTIVE PROTEIN: CPT | Performed by: STUDENT IN AN ORGANIZED HEALTH CARE EDUCATION/TRAINING PROGRAM

## 2025-06-25 PROCEDURE — 63600175 PHARM REV CODE 636 W HCPCS: Performed by: INTERNAL MEDICINE

## 2025-06-25 PROCEDURE — 99232 SBSQ HOSP IP/OBS MODERATE 35: CPT | Mod: ,,, | Performed by: NURSE PRACTITIONER

## 2025-06-25 PROCEDURE — 84134 ASSAY OF PREALBUMIN: CPT | Performed by: STUDENT IN AN ORGANIZED HEALTH CARE EDUCATION/TRAINING PROGRAM

## 2025-06-25 PROCEDURE — 99232 SBSQ HOSP IP/OBS MODERATE 35: CPT | Mod: ,,, | Performed by: INTERNAL MEDICINE

## 2025-06-25 PROCEDURE — 83615 LACTATE (LD) (LDH) ENZYME: CPT | Performed by: STUDENT IN AN ORGANIZED HEALTH CARE EDUCATION/TRAINING PROGRAM

## 2025-06-25 PROCEDURE — 25000003 PHARM REV CODE 250: Performed by: STUDENT IN AN ORGANIZED HEALTH CARE EDUCATION/TRAINING PROGRAM

## 2025-06-25 PROCEDURE — 93750 INTERROGATION VAD IN PERSON: CPT | Mod: ,,, | Performed by: INTERNAL MEDICINE

## 2025-06-25 PROCEDURE — 83735 ASSAY OF MAGNESIUM: CPT | Performed by: STUDENT IN AN ORGANIZED HEALTH CARE EDUCATION/TRAINING PROGRAM

## 2025-06-25 PROCEDURE — G0545 PR VISIT INHERENT TO INPT OR OBS CARE, INFECTIOUS DISEASE: HCPCS | Mod: ,,, | Performed by: INTERNAL MEDICINE

## 2025-06-25 PROCEDURE — 93010 ELECTROCARDIOGRAM REPORT: CPT | Mod: ,,, | Performed by: INTERNAL MEDICINE

## 2025-06-25 PROCEDURE — 93005 ELECTROCARDIOGRAM TRACING: CPT

## 2025-06-25 PROCEDURE — 84100 ASSAY OF PHOSPHORUS: CPT | Performed by: STUDENT IN AN ORGANIZED HEALTH CARE EDUCATION/TRAINING PROGRAM

## 2025-06-25 PROCEDURE — 85025 COMPLETE CBC W/AUTO DIFF WBC: CPT | Performed by: BEHAVIOR TECHNICIAN

## 2025-06-25 PROCEDURE — 99233 SBSQ HOSP IP/OBS HIGH 50: CPT | Mod: GC,,, | Performed by: PHYSICIAN ASSISTANT

## 2025-06-25 PROCEDURE — 20600001 HC STEP DOWN PRIVATE ROOM

## 2025-06-25 PROCEDURE — 27000248 HC VAD-ADDITIONAL DAY

## 2025-06-25 PROCEDURE — 85730 THROMBOPLASTIN TIME PARTIAL: CPT | Performed by: STUDENT IN AN ORGANIZED HEALTH CARE EDUCATION/TRAINING PROGRAM

## 2025-06-25 PROCEDURE — 97535 SELF CARE MNGMENT TRAINING: CPT

## 2025-06-25 RX ORDER — BLOOD-GLUCOSE SENSOR
1 EACH MISCELLANEOUS
Qty: 3 EACH | Refills: 2 | Status: SHIPPED | OUTPATIENT
Start: 2025-06-25 | End: 2026-06-25

## 2025-06-25 RX ORDER — INSULIN ASPART 100 [IU]/ML
7 INJECTION, SOLUTION INTRAVENOUS; SUBCUTANEOUS
Status: DISCONTINUED | OUTPATIENT
Start: 2025-06-26 | End: 2025-06-27

## 2025-06-25 RX ORDER — INSULIN ASPART 100 [IU]/ML
4 INJECTION, SOLUTION INTRAVENOUS; SUBCUTANEOUS ONCE
Status: COMPLETED | OUTPATIENT
Start: 2025-06-25 | End: 2025-06-25

## 2025-06-25 RX ADMIN — PIPERACILLIN SODIUM AND TAZOBACTAM SODIUM 4.5 G: 4; .5 INJECTION, POWDER, FOR SOLUTION INTRAVENOUS at 05:06

## 2025-06-25 RX ADMIN — INSULIN ASPART 4 UNITS: 100 INJECTION, SOLUTION INTRAVENOUS; SUBCUTANEOUS at 05:06

## 2025-06-25 RX ADMIN — SPIRONOLACTONE 25 MG: 25 TABLET, FILM COATED ORAL at 08:06

## 2025-06-25 RX ADMIN — INSULIN GLARGINE 24 UNITS: 100 INJECTION, SOLUTION SUBCUTANEOUS at 08:06

## 2025-06-25 RX ADMIN — INSULIN ASPART 8 UNITS: 100 INJECTION, SOLUTION INTRAVENOUS; SUBCUTANEOUS at 08:06

## 2025-06-25 RX ADMIN — INSULIN ASPART 1 UNITS: 100 INJECTION, SOLUTION INTRAVENOUS; SUBCUTANEOUS at 09:06

## 2025-06-25 RX ADMIN — MIRTAZAPINE 30 MG: 30 TABLET, FILM COATED ORAL at 08:06

## 2025-06-25 RX ADMIN — ATORVASTATIN CALCIUM 80 MG: 40 TABLET, FILM COATED ORAL at 08:06

## 2025-06-25 RX ADMIN — PIPERACILLIN SODIUM AND TAZOBACTAM SODIUM 4.5 G: 4; .5 INJECTION, POWDER, FOR SOLUTION INTRAVENOUS at 02:06

## 2025-06-25 RX ADMIN — PIPERACILLIN SODIUM AND TAZOBACTAM SODIUM 4.5 G: 4; .5 INJECTION, POWDER, FOR SOLUTION INTRAVENOUS at 09:06

## 2025-06-25 RX ADMIN — INSULIN ASPART 8 UNITS: 100 INJECTION, SOLUTION INTRAVENOUS; SUBCUTANEOUS at 12:06

## 2025-06-25 RX ADMIN — WARFARIN SODIUM 5 MG: 5 TABLET ORAL at 05:06

## 2025-06-25 RX ADMIN — AMLODIPINE BESYLATE 10 MG: 10 TABLET ORAL at 08:06

## 2025-06-25 RX ADMIN — MUPIROCIN: 20 OINTMENT TOPICAL at 08:06

## 2025-06-25 NOTE — PT/OT/SLP PROGRESS
Occupational Therapy   Treatment    Name: Rocco France  MRN: [id number]  Admitting Diagnosis:  Type 2 diabetes mellitus  3 Days Post-Op    Recommendations:     Discharge Recommendations: Low Intensity Therapy  Discharge Equipment Recommendations:  walker, rolling  Barriers to discharge:  None    Assessment:     Rocco France is a 69 y.o. male with a medical diagnosis of Type 2 diabetes mellitus.  He presents with decreased self-care and functional mobility independence 2/2 AMS. Performance deficits affecting function are weakness, gait instability, impaired endurance, impaired balance, impaired cardiopulmonary response to activity, decreased lower extremity function, decreased safety awareness, impaired self care skills, orthopedic precautions, impaired functional mobility. This date pt with improved activity tolerance compared to previous OT sessions AEB completion of increased functional mobility distance. Pt completed wall power to battery power transfer with independence prior to mobility trial for LVAD.  Patient continues to demonstrate the need for low intensity therapy on a scheduled basis exhibited by decreased independence with self-care and functional mobility     Rehab Prognosis:  Good; patient would benefit from acute skilled OT services to address these deficits and reach maximum level of function.       Plan:     Patient to be seen 4 x/week to address the above listed problems via self-care/home management, therapeutic activities, therapeutic exercises, neuromuscular re-education  Plan of Care Expires: 07/05/25  Plan of Care Reviewed with: patient    Subjective     Chief Complaint: n/a  Patient/Family Comments/goals: none stated  Pain/Comfort:  Pain Rating 1: 0/10  Pain Rating Post-Intervention 1: 0/10    Objective:     Communicated with: nursing prior to session.  Patient found HOB elevated with Condom Catheter, telemetry, LVAD upon OT entry to room.    General Precautions: Standard, fall,  aspiration, LVAD    Orthopedic Precautions:RLE weight bearing as tolerated (darco shoe and short distances)  Braces:  (darco shoe)  Respiratory Status: Room air     Occupational Performance:     Bed Mobility:    Patient completed Scooting/Bridging with stand by assistance  Patient completed Supine to Sit with stand by assistance   Pt seated EOB with SBA    Functional Mobility/Transfers:  Patient completed Sit <> Stand Transfer with contact guard assistance  with  no assistive device   Functional Mobility: Pt engaged in functional mobility to simulate household/community distances in room and in aaron to touch opposite side of the aaron with CGA and no AD in order to maximize functional endurance and standing balance required for engagement in occupations of choice - pt with 1 minimal LOB, mild gait instability, and lateral sway observed     Activities of Daily Living:  Lower Body Dressing: supervision to taylor darco shoe  LVAD: pt transitioning from wall power to battery power with independence while seated EOB. Of note, pt preferring to utilize shower bag as consolidation bag. Emergency bag prepared and remaining with pt for mobility trial    [facility] 6 Click ADL: 22    Treatment & Education:  Session this date targeted self-care and therapeutic activities to increase pt's independence  Pt educated on importance of OOB activities with staff assistance, as well as to build activity tolerance  Pt educated on OT roles, POC, call button for assistance    Patient left sitting edge of bed with all lines intact, call button in reach, nursing notified, and family present    GOALS:   Multidisciplinary Problems       Occupational Therapy Goals          Problem: Occupational Therapy    Goal Priority Disciplines Outcome Interventions   Occupational Therapy Goal     OT, PT/OT Progressing    Description: Goals to be met by: 7/5/25     Patient will increase functional independence with ADLs by performing:    LE Dressing with Modified  [place].  Grooming while standing at sink with Modified [place].  Toileting from toilet with Modified [place] for hygiene and clothing management.   Supine to sit with Modified [place].  Toilet transfer to toilet with Modified [place].                         DME Justifications:   Rocco's mobility limitation cannot be sufficiently resolved by the use of a cane. His functional mobility deficit can be sufficiently resolved with the use of a Rolling Walker. Patient's mobility limitation significantly impairs their ability to participate in one of more activities of daily living.  The use of a RW will significantly improve the patient's ability to participate in MRADLS and the patient will use it on regular basis in the home.    Time Tracking:     OT Date of Treatment: 06/25/25  OT Start Time: 1034  OT Stop Time: 1049  OT Total Time (min): 15 min    Billable Minutes:Self Care/Home Management 15    OT/[place]: OT          6/25/2025

## 2025-06-25 NOTE — SUBJECTIVE & OBJECTIVE
Interval History: No events overnight. E Coli bacteremia pending ID final recs. Patient appears euvolemic        Continuous Infusions:  Scheduled Meds:   amLODIPine  10 mg Oral Daily    atorvastatin  80 mg Oral Daily    insulin aspart U-100  8 Units Subcutaneous TIDWM    insulin glargine U-100  24 Units Subcutaneous Daily    magnesium citrate  296 mL Oral Once    mirtazapine  30 mg Oral QHS    mupirocin   Nasal BID    piperacillin-tazobactam (Zosyn) IV (PEDS and ADULTS) (extended infusion is not appropriate)  4.5 g Intravenous Q8H    polyethylene glycol  17 g Oral Daily    spironolactone  25 mg Oral Daily    warfarin  2.5 mg Oral Every Tues, Thurs, Sat, Sun    warfarin  5 mg Oral Every Mon, Wed, Fri     PRN Meds:  Current Facility-Administered Medications:     bisacodyL, 10 mg, Rectal, Daily PRN    chlorproMAZINE, 25 mg, Oral, TID PRN    dextrose 50%, 12.5 g, Intravenous, PRN    dextrose 50%, 25 g, Intravenous, PRN    fentaNYL, 25 mcg, Intravenous, Q5 Min PRN    glucagon (human recombinant), 1 mg, Intramuscular, PRN    glucose, 16 g, Oral, PRN    glucose, 24 g, Oral, PRN    insulin aspart U-100, 0-5 Units, Subcutaneous, QID (AC + HS) PRN    ondansetron, 4 mg, Intravenous, Daily PRN    Review of patient's allergies indicates:   Allergen Reactions    Pcn [penicillins] Hives     Objective:     Vital Signs (Most Recent):  Temp: 97.9 °F (36.6 °C) (06/25/25 0750)  Pulse: 102 (06/25/25 1121)  Resp: 18 (06/25/25 0750)  BP: 102/70 (06/25/25 0755)  SpO2: 99 % (06/25/25 0750) Vital Signs (24h Range):  Temp:  [97.9 °F (36.6 °C)-98.6 °F (37 °C)] 97.9 °F (36.6 °C)  Pulse:  [] 102  Resp:  [16-18] 18  SpO2:  [99 %-100 %] 99 %  BP: ()/(0-71) 102/70     Patient Vitals for the past 72 hrs (Last 3 readings):   Weight   06/25/25 0755 73.5 kg (162 lb 0.6 oz)   06/25/25 0645 74.4 kg (164 lb 0.4 oz)   06/24/25 0900 73.6 kg (162 lb 4.1 oz)     Body mass index is 20.8 kg/m².      Intake/Output Summary (Last 24 hours) at 6/25/2025  1201  Last data filed at 6/25/2025 0657  Gross per 24 hour   Intake 684 ml   Output 1100 ml   Net -416 ml       Hemodynamic Parameters:       Telemetry: reviewed       Physical Exam  Vitals reviewed.   Constitutional:       Appearance: He is normal weight.   HENT:      Head: Normocephalic and atraumatic.      Right Ear: External ear normal.      Left Ear: External ear normal.      Nose: Nose normal.      Mouth/Throat:      Mouth: Mucous membranes are moist.      Pharynx: Oropharynx is clear.   Eyes:      Conjunctiva/sclera: Conjunctivae normal.   Cardiovascular:      Rate and Rhythm: Normal rate and regular rhythm.      Pulses: Normal pulses.   Pulmonary:      Effort: Pulmonary effort is normal.      Breath sounds: Normal breath sounds.   Abdominal:      General: Abdomen is flat. Bowel sounds are normal.      Palpations: Abdomen is soft.   Musculoskeletal:      Cervical back: Normal range of motion.      Right lower leg: No edema.      Left lower leg: No edema.   Skin:     General: Skin is warm.      Capillary Refill: Capillary refill takes less than 2 seconds.   Neurological:      Mental Status: He is alert. Mental status is at baseline.   Psychiatric:         Mood and Affect: Mood normal.         Behavior: Behavior normal.            Significant Labs:  CBC:  Recent Labs   Lab 06/23/25  0646 06/24/25  0258 06/25/25  0246   WBC 12.64 13.49* 12.91*   RBC 3.56* 3.27* 3.12*   HGB 10.4* 9.4* 9.0*   HCT 31.8* 29.6* 28.3*    225 235   MCV 89 91 91   MCH 29.2 28.7 28.8   MCHC 32.7 31.8* 31.8*     BNP:  Recent Labs   Lab 06/20/25  0959 06/23/25  0646 06/25/25  0246   * 270* 424*     CMP:  Recent Labs   Lab 06/20/25  0959 06/20/25  1404 06/23/25  0646 06/23/25  0851 06/24/25  0258 06/25/25  0246   GLU  --    < >  --  243* 143* 125*   CALCIUM  --    < >  --  9.0 8.8 8.5*   ALBUMIN 2.6*  --  2.2*  --   --  2.0*   PROT 9.5*  --  9.0*  --   --  8.5*   NA  --    < >  --  133* 133* 133*   K  --    < >  --  4.6 4.2  4.2   CO2  --    < >  --  27 24 24   CL  --    < >  --  98 100 100   BUN  --    < >  --  28* 27* 22   CREATININE  --    < >  --  1.3 1.3 1.2   ALKPHOS 216*  --  207*  --   --  257*   ALT 19  --  15  --   --  21   AST 42  --  28  --   --  47*   BILITOT 2.7*  --  1.1*  --   --  0.9    < > = values in this interval not displayed.      Coagulation:   Recent Labs   Lab 06/23/25  0646 06/24/25  0258 06/25/25  0246   INR 1.5* 1.6* 1.6*   APTT 34.7* 34.6* 35.6*     LDH:  Recent Labs   Lab 06/23/25  0646 06/24/25  0258 06/25/25  0246    204 211     Microbiology:  Microbiology Results (last 7 days)       Procedure Component Value Units Date/Time    Blood culture [[id number]]  (Normal) Collected: 06/23/25 0646    Order Status: Completed Specimen: Blood from Peripheral, Antecubital, Right Updated: 06/25/25 0901     Blood Culture No Growth After 48 Hours    Culture, Anaerobic [[id number]] Collected: 06/22/25 1450    Order Status: Completed Specimen: Bone from Toe, Right Foot Updated: 06/24/25 1055     Anaerobe Culture Culture In Progress    Aerobic culture [[id number]]  (Abnormal) Collected: 06/22/25 1450    Order Status: Completed Specimen: Bone from Toe, Right Foot Updated: 06/24/25 0719     CULTURE, AEROBIC Few Enterococcus species     Comment: Identification and susceptibility pending       Afb Culture Stain [[id number]] Collected: 06/22/25 1450    Order Status: Completed Specimen: Bone from Toe, Right Foot Updated: 06/23/25 1518     ACID FAST STAIN  No acid fast bacilli seen    Fungus culture [[id number]]  (Normal) Collected: 06/22/25 1450    Order Status: Completed Specimen: Bone from Toe, Right Foot Updated: 06/23/25 1009     Fungal Culture Culture In Progress    Gram stain [[id number]] Collected: 06/22/25 1450    Order Status: Completed Specimen: Bone from Toe, Right Foot Updated: 06/22/25 1857     GRAM STAIN No WBCs, epithelial cells or organisms seen    AFB Culture & Smear [[id number]] Collected: 06/22/25 1453     Order Status: Resulted Specimen: Bone from Toe, Right Foot Updated: 06/22/25 1640    Culture, Anaerobe [[id number]]     Order Status: Sent Specimen: Skin     Aerobic culture [[id number]]     Order Status: Sent Specimen: Skin             I have reviewed all pertinent labs within the past 24 hours.    Estimated Creatinine Clearance: 60.4 mL/min (based on SCr of 1.2 mg/dL).    Diagnostic Results:  I have reviewed and interpreted all pertinent imaging results/findings within the past 24 hours.

## 2025-06-25 NOTE — PLAN OF CARE
Pt maintained free from falls/trauma/injuries and skin breakdown. Pt denied pain or discomfort. Plan of care reviewed. Pt verbalized understanding. All questions and concerns addressed. ALICIA. NAD.

## 2025-06-25 NOTE — SUBJECTIVE & OBJECTIVE
Interval History: naeo.tolerating zosyn without side effects    Review of Systems   Constitutional:  Negative for chills, fatigue and fever.   HENT:  Negative for ear pain, mouth sores, nosebleeds, postnasal drip, rhinorrhea, sinus pressure, sore throat, tinnitus, trouble swallowing and voice change.    Eyes:  Negative for photophobia, pain, redness and visual disturbance.   Respiratory:  Negative for apnea, cough, chest tightness, shortness of breath and wheezing.    Cardiovascular:  Negative for chest pain, palpitations and leg swelling.   Gastrointestinal:  Negative for abdominal pain, blood in stool, constipation, diarrhea, nausea and vomiting.   Endocrine: Negative for cold intolerance, heat intolerance, polydipsia and polyuria.   Genitourinary:  Negative for decreased urine volume, difficulty urinating, dysuria, flank pain, frequency, genital sores, hematuria, penile discharge, penile pain, penile swelling, scrotal swelling, testicular pain and urgency.   Musculoskeletal:  Negative for arthralgias, back pain, joint swelling, myalgias and neck pain.   Skin:  Positive for wound. Negative for color change and rash.   Allergic/Immunologic: Negative for environmental allergies and food allergies.   Neurological:  Negative for dizziness, seizures, syncope, weakness, light-headedness, numbness and headaches.   Hematological:  Negative for adenopathy. Does not bruise/bleed easily.   Psychiatric/Behavioral:  Negative for agitation, confusion, decreased concentration, hallucinations, self-injury, sleep disturbance and suicidal ideas. The patient is not nervous/anxious.      Objective:     Vital Signs (Most Recent):  Temp: 98.1 °F (36.7 °C) (06/25/25 1200)  Pulse: 79 (06/25/25 1538)  Resp: 18 (06/25/25 1200)  BP: 107/77 (06/25/25 1202)  SpO2: 99 % (06/25/25 1200) Vital Signs (24h Range):  Temp:  [97.9 °F (36.6 °C)-98.6 °F (37 °C)] 98.1 °F (36.7 °C)  Pulse:  [] 79  Resp:  [16-18] 18  SpO2:  [99 %-100 %] 99 %  BP:  ()/(0-77) 107/77     Weight: 73.5 kg (162 lb 0.6 oz)  Body mass index is 20.8 kg/m².    Estimated Creatinine Clearance: 60.4 mL/min (based on SCr of 1.2 mg/dL).     Physical Exam  Vitals and nursing note reviewed. Exam conducted with a chaperone present.   Constitutional:       Appearance: Normal appearance.   HENT:      Head: Normocephalic and atraumatic.   Eyes:      General: No scleral icterus.        Right eye: No discharge.         Left eye: No discharge.      Conjunctiva/sclera: Conjunctivae normal.   Cardiovascular:      Rate and Rhythm: Normal rate and regular rhythm.      Pulses: Normal pulses.      Heart sounds: No murmur heard.     Comments: VAD Humming sounds  Pulmonary:      Effort: Pulmonary effort is normal. No respiratory distress.      Breath sounds: Normal breath sounds. No stridor. No wheezing or rhonchi.   Abdominal:      General: There is no distension.      Palpations: Abdomen is soft.      Tenderness: There is no abdominal tenderness.      Comments: DLES covered. No tenderness to palpation.   Musculoskeletal:      Comments: Right foot covered with compression bandage.   Skin:     General: Skin is warm and dry.   Neurological:      General: No focal deficit present.      Mental Status: He is alert and oriented to person, place, and time.          Significant Labs: CBC:   Recent Labs   Lab 06/24/25  0258 06/25/25  0246   WBC 13.49* 12.91*   HGB 9.4* 9.0*   HCT 29.6* 28.3*    235     CMP:   Recent Labs   Lab 06/24/25  0258 06/25/25  0246   * 133*   K 4.2 4.2    100   CO2 24 24   * 125*   BUN 27* 22   CREATININE 1.3 1.2   CALCIUM 8.8 8.5*   PROT  --  8.5*   ALBUMIN  --  2.0*   BILITOT  --  0.9   ALKPHOS  --  257*   AST  --  47*   ALT  --  21   ANIONGAP 9 9     Microbiology Results (last 7 days)       Procedure Component Value Units Date/Time    Aerobic culture [[id number]]  (Abnormal)  (Susceptibility) Collected: 06/22/25 1450    Order Status: Completed Specimen:  Bone from Toe, Right Foot Updated: 06/25/25 1235     CULTURE, AEROBIC Few Enterococcus faecalis    Blood culture [[id number]]  (Normal) Collected: 06/23/25 0646    Order Status: Completed Specimen: Blood from Peripheral, Antecubital, Right Updated: 06/25/25 0901     Blood Culture No Growth After 48 Hours    Culture, Anaerobic [[id number]] Collected: 06/22/25 1450    Order Status: Completed Specimen: Bone from Toe, Right Foot Updated: 06/24/25 1055     Anaerobe Culture Culture In Progress    Afb Culture Stain [[id number]] Collected: 06/22/25 1450    Order Status: Completed Specimen: Bone from Toe, Right Foot Updated: 06/23/25 1518     ACID FAST STAIN  No acid fast bacilli seen    Fungus culture [[id number]]  (Normal) Collected: 06/22/25 1450    Order Status: Completed Specimen: Bone from Toe, Right Foot Updated: 06/23/25 1009     Fungal Culture Culture In Progress    Gram stain [[id number]] Collected: 06/22/25 1450    Order Status: Completed Specimen: Bone from Toe, Right Foot Updated: 06/22/25 1857     GRAM STAIN No WBCs, epithelial cells or organisms seen    AFB Culture & Smear [[id number]] Collected: 06/22/25 1450    Order Status: Resulted Specimen: Bone from Toe, Right Foot Updated: 06/22/25 1640    Culture, Anaerobe [[id number]]     Order Status: Sent Specimen: Skin     Aerobic culture [[id number]]     Order Status: Sent Specimen: Skin             Significant Imaging: I have reviewed all pertinent imaging results/findings within the past 24 hours.

## 2025-06-25 NOTE — NURSING
Notified Simon.PA BG 90 before supper, insulin aspart changed to 4 units from 8 units, and pt had more than 80% dinner, Simon.NP is ok to give 4 units insulin, pt administered it with RN supervision.

## 2025-06-25 NOTE — PROGRESS NOTES
"[facility]  Endocrinology  Progress Note    Admit Date: 2025     Reason for Consult: Management of T2DM, Hyperglycemia     Surgical Procedure and Date:  s/p right toe amputation 2025    Diabetes diagnosis year:     Home Diabetes Medications:  None currently     Lab Results   Component Value Date    HGBA1C 6.6 (H) 2023       How often checking glucose at home? 1x per week   BG readings on regimen: 120s  Hypoglycemia on the regimen?  N/A  Missed doses on regimen?  N/A     Diabetes Complications include:     Hyperglycemia    Complicating diabetes co morbidities:   HTN, CHF      HPI:   Patient is a 69 y.o. 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  He was admitted for syncope 2022 at which time he was found to have an evolving right cerebral acute subdural hematoma and acute basal cistern subarachnoid hemorrhage. Patient presented to outside hospital emergency room with generalized weakness.  Patient is a poor historian.  He reports falling 3 times at home and has had weakness over the past week.  EMS called, noted sugar greater than 500.  Upon arrival, oral temperature is 101.1° F. patient complaining of dysuria, inability to "hold his urine".  Patient states he saw a doctor today, unable to give a urine sample, but had his INR drawn which was greater than 4.5. Endocrinology consulted for management of T2DM.     Interval HPI:   Overnight events: No acute events overnight. Patient in room 3098/3098 A. Blood glucose stable. BG at and below goal on current insulin regimen (SSI, prandial, and basal insulin ). Steroid use- None . 3 Days Post-Op  Renal function- Normal   Vasopressors-  None       Endocrine will continue to follow and manage insulin orders inpatient.         Diet Consistent Carbohydrate 2000 Calories (up to 75 gm per meal)     Eatin%  Nausea: " "No  Hypoglycemia and intervention: No  Fever: No  TPN and/or TF: No  If yes, type of TF/TPN and rate:  N/A    /70 (BP Location: Left arm, Patient Position: Lying)   Pulse 75   Temp 97.9 °F (36.6 °C) (Oral)   Resp 18   Ht 6' 2" (1.88 m)   Wt 73.5 kg (162 lb 0.6 oz)   SpO2 99%   BMI 20.80 kg/m²     Labs Reviewed and Include    Recent Labs   Lab 06/25/25  0246   *   CALCIUM 8.5*   ALBUMIN 2.0*   PROT 8.5*   *   K 4.2   CO2 24      BUN 22   CREATININE 1.2   ALKPHOS 257*   ALT 21   AST 47*   BILITOT 0.9     Lab Results   Component Value Date    WBC 12.91 (H) 06/25/2025    HGB 9.0 (L) 06/25/2025    HCT 28.3 (L) 06/25/2025    MCV 91 06/25/2025     06/25/2025     No results for input(s): "TSH", "FREET4" in the last 168 hours.  Lab Results   Component Value Date    HGBA1C 12.4 (H) 06/20/2025       Nutritional status:   Body mass index is 20.8 kg/m².  Lab Results   Component Value Date    ALBUMIN 2.0 (L) 06/25/2025    ALBUMIN 2.2 (L) 06/23/2025    ALBUMIN 2.6 (L) 06/20/2025     Lab Results   Component Value Date    PREALBUMIN 5 (L) 06/25/2025    PREALBUMIN 5 (L) 06/23/2025    PREALBUMIN 5 (L) 06/20/2025       Estimated Creatinine Clearance: 60.4 mL/min (based on SCr of 1.2 mg/dL).    Accu-Checks  Recent Labs     06/23/25  0737 06/23/25  1143 06/23/25  1552 06/23/25 2010 06/24/25  0639 06/24/25  1145 06/24/25  1604 06/24/25  2037 06/25/25  0634 06/25/25  0706   POCTGLUCOSE 157* 192* 132* 108 157* 133* 92 161* 159* 142*       Current Medications and/or Treatments Impacting Glycemic Control  Immunotherapy:    Immunosuppressants       None          Steroids:   Hormones (From admission, onward)      None          Pressors:    Autonomic Drugs (From admission, onward)      None          Hyperglycemia/Diabetes Medications:   Antihyperglycemics (From admission, onward)      Start     Stop Route Frequency Ordered    06/22/25 1130  insulin aspart U-100 pen 8 Units         -- SubQ 3 times daily " with meals 06/22/25 0736    06/22/25 0900  insulin glargine U-100 (Lantus) pen 24 Units         -- SubQ Daily 06/22/25 0736    06/20/25 1449  insulin aspart U-100 pen 0-5 Units         -- SubQ Before meals & nightly PRN 06/20/25 1349            ASSESSMENT and PLAN    Cardiac/Vascular  Hyperlipidemia  May increase insulin resistance.         LVAD (left ventricular assist device) present  Managed per primary team  Optimize BG control        Endocrine  * Type 2 diabetes mellitus  BG goal 140-180  S/p R toe amputation on 6/22.     Plan:  Continue Lantus 24 units once daily   Continue Novolog 8 units TID with meals   Low Dose Correction Scale  BG monitoring ac/hs    ** Please call Endocrine for any BG related issues **      Lab Results   Component Value Date    HGBA1C 12.4 (H) 06/20/2025     Bedside nurse to instruct on insulin pen use and blood sugar monitor. Have patient administer own injections after education completed supervised by nurse.    Discharge plans:  Likely will d/c with MDI regimen given poorly controlled A1c. Please notify Endocrine prior to d/c for updated recs  Lantus 24 units once daily  Novolog 8 units TID with meals in addition to the following correction scale prn ac/hs  150 - 200 + 1 unit  201 - 250 + 2 units  251 - 300 + 3 units  301 - 350 + 4 units   > 350   + 5 units    Patient would benefit from Dexcom at discharge if insurance approved. Will need follow up in Endocrine clinic and outpatient DM education scheduled at time of discharge.         Marcia Montes NP  Endocrinology  [facility]

## 2025-06-25 NOTE — PROGRESS NOTES
Subjective/Objective  Interval History: naeo.tolerating zosyn without side effects    Review of Systems   Constitutional:  Negative for chills, fatigue and fever.   HENT:  Negative for ear pain, mouth sores, nosebleeds, postnasal drip, rhinorrhea, sinus pressure, sore throat, tinnitus, trouble swallowing and voice change.    Eyes:  Negative for photophobia, pain, redness and visual disturbance.   Respiratory:  Negative for apnea, cough, chest tightness, shortness of breath and wheezing.    Cardiovascular:  Negative for chest pain, palpitations and leg swelling.   Gastrointestinal:  Negative for abdominal pain, blood in stool, constipation, diarrhea, nausea and vomiting.   Endocrine: Negative for cold intolerance, heat intolerance, polydipsia and polyuria.   Genitourinary:  Negative for decreased urine volume, difficulty urinating, dysuria, flank pain, frequency, genital sores, hematuria, penile discharge, penile pain, penile swelling, scrotal swelling, testicular pain and urgency.   Musculoskeletal:  Negative for arthralgias, back pain, joint swelling, myalgias and neck pain.   Skin:  Positive for wound. Negative for color change and rash.   Allergic/Immunologic: Negative for environmental allergies and food allergies.   Neurological:  Negative for dizziness, seizures, syncope, weakness, light-headedness, numbness and headaches.   Hematological:  Negative for adenopathy. Does not bruise/bleed easily.   Psychiatric/Behavioral:  Negative for agitation, confusion, decreased concentration, hallucinations, self-injury, sleep disturbance and suicidal ideas. The patient is not nervous/anxious.      Objective:     Vital Signs (Most Recent):  Temp: 98.1 °F (36.7 °C) (06/25/25 1200)  Pulse: 79 (06/25/25 1538)  Resp: 18 (06/25/25 1200)  BP: 107/77 (06/25/25 1202)  SpO2: 99 % (06/25/25 1200) Vital Signs (24h Range):  Temp:  [97.9 °F (36.6 °C)-98.6 °F (37 °C)] 98.1 °F (36.7 °C)  Pulse:  [] 79  Resp:  [16-18] 18  SpO2:  [99  %-100 %] 99 %  BP: ()/(0-77) 107/77     Weight: 73.5 kg (162 lb 0.6 oz)  Body mass index is 20.8 kg/m².    Estimated Creatinine Clearance: 60.4 mL/min (based on SCr of 1.2 mg/dL).     Physical Exam  Vitals and nursing note reviewed. Exam conducted with a chaperone present.   Constitutional:       Appearance: Normal appearance.   HENT:      Head: Normocephalic and atraumatic.   Eyes:      General: No scleral icterus.        Right eye: No discharge.         Left eye: No discharge.      Conjunctiva/sclera: Conjunctivae normal.   Cardiovascular:      Rate and Rhythm: Normal rate and regular rhythm.      Pulses: Normal pulses.      Heart sounds: No murmur heard.     Comments: VAD Humming sounds  Pulmonary:      Effort: Pulmonary effort is normal. No respiratory distress.      Breath sounds: Normal breath sounds. No stridor. No wheezing or rhonchi.   Abdominal:      General: There is no distension.      Palpations: Abdomen is soft.      Tenderness: There is no abdominal tenderness.      Comments: DLES covered. No tenderness to palpation.   Musculoskeletal:      Comments: Right foot covered with compression bandage.   Skin:     General: Skin is warm and dry.   Neurological:      General: No focal deficit present.      Mental Status: He is alert and oriented to person, place, and time.          Significant Labs: CBC:   Recent Labs   Lab 06/24/25  0258 06/25/25  0246   WBC 13.49* 12.91*   HGB 9.4* 9.0*   HCT 29.6* 28.3*    235     CMP:   Recent Labs   Lab 06/24/25  0258 06/25/25  0246   * 133*   K 4.2 4.2    100   CO2 24 24   * 125*   BUN 27* 22   CREATININE 1.3 1.2   CALCIUM 8.8 8.5*   PROT  --  8.5*   ALBUMIN  --  2.0*   BILITOT  --  0.9   ALKPHOS  --  257*   AST  --  47*   ALT  --  21   ANIONGAP 9 9     Microbiology Results (last 7 days)       Procedure Component Value Units Date/Time    Aerobic culture [[id number]]  (Abnormal)  (Susceptibility) Collected: 06/22/25 1450    Order Status:  Completed Specimen: Bone from Toe, Right Foot Updated: 06/25/25 1235     CULTURE, AEROBIC Few Enterococcus faecalis    Blood culture [[id number]]  (Normal) Collected: 06/23/25 0646    Order Status: Completed Specimen: Blood from Peripheral, Antecubital, Right Updated: 06/25/25 0901     Blood Culture No Growth After 48 Hours    Culture, Anaerobic [[id number]] Collected: 06/22/25 1450    Order Status: Completed Specimen: Bone from Toe, Right Foot Updated: 06/24/25 1055     Anaerobe Culture Culture In Progress    Afb Culture Stain [[id number]] Collected: 06/22/25 1450    Order Status: Completed Specimen: Bone from Toe, Right Foot Updated: 06/23/25 1518     ACID FAST STAIN  No acid fast bacilli seen    Fungus culture [[id number]]  (Normal) Collected: 06/22/25 1450    Order Status: Completed Specimen: Bone from Toe, Right Foot Updated: 06/23/25 1009     Fungal Culture Culture In Progress    Gram stain [[id number]] Collected: 06/22/25 1450    Order Status: Completed Specimen: Bone from Toe, Right Foot Updated: 06/22/25 1857     GRAM STAIN No WBCs, epithelial cells or organisms seen    AFB Culture & Smear [[id number]] Collected: 06/22/25 1450    Order Status: Resulted Specimen: Bone from Toe, Right Foot Updated: 06/22/25 1640    Culture, Anaerobe [[id number]]     Order Status: Sent Specimen: Skin     Aerobic culture [[id number]]     Order Status: Sent Specimen: Skin             Significant Imaging: I have reviewed all pertinent imaging results/findings within the past 24 hours.    Scheduled Meds:   amLODIPine  10 mg Oral Daily    atorvastatin  80 mg Oral Daily    insulin aspart U-100  8 Units Subcutaneous TIDWM    insulin glargine U-100  24 Units Subcutaneous Daily    magnesium citrate  296 mL Oral Once    mirtazapine  30 mg Oral QHS    mupirocin   Nasal BID    piperacillin-tazobactam (Zosyn) IV (PEDS and ADULTS) (extended infusion is not appropriate)  4.5 g Intravenous Q8H    polyethylene glycol  17 g Oral Daily     "spironolactone  25 mg Oral Daily    warfarin  2.5 mg Oral Every Tues, Thurs, Sat, Sun    warfarin  5 mg Oral Every Mon, Wed, Fri     Continuous Infusions:  PRN Meds:  Current Facility-Administered Medications:     bisacodyL, 10 mg, Rectal, Daily PRN    chlorproMAZINE, 25 mg, Oral, TID PRN    dextrose 50%, 12.5 g, Intravenous, PRN    dextrose 50%, 25 g, Intravenous, PRN    fentaNYL, 25 mcg, Intravenous, Q5 Min PRN    glucagon (human recombinant), 1 mg, Intramuscular, PRN    glucose, 16 g, Oral, PRN    glucose, 24 g, Oral, PRN    insulin aspart U-100, 0-5 Units, Subcutaneous, QID (AC + HS) PRN    ondansetron, 4 mg, Intravenous, Daily PRN    Vital signs in last 24 hours:  Temp:  [97.9 °F (36.6 °C)-98.6 °F (37 °C)] 98.1 °F (36.7 °C)  Pulse:  [] 79  Resp:  [16-18] 18  SpO2:  [99 %-100 %] 99 %  BP: ()/(0-77) 107/77    Intake/Output last 3 shifts:  I/O last 3 completed shifts:  In: 1124 [P.O.:1124]  Out: 1650 [Urine:1650]  Intake/Output this shift:  No intake/output data recorded.    Problem Assessment/Plan  ID  E coli bacteremia  Assessment & Plan  69M with h/o T2DM, HTN, CHF s/p HM3 implantation 2021 admitted 6/20 with generalized weakness, inability to "hold his urine" and hyperglycemia. Found to be febrile with toe wound. S/p ray amputation R toe  6/22, purulence noted. Bcx with e.coli (pan susceptible). Prox margin path/cultures pending (enterococcus ). Fevers resolved. Wbc trending down. changed Ceftriaxone --> zosyn when enterococcus started growing    Suspect toe infection was source of bacteremia, risk factor wet shoes. Appreciate podiatry assistance with source control. Quite possible foot infection polymicrobial, but awaiting final results of culture (so far enterococcus only, suspectibilities pending) . suspect he has residual infection in foot so will likely need 6 week course of abx     Recommendations:   - continue zosyn. Plan for 6 week course (est end date 7/31)  - please notify ID of any new " growth on cultures    Outpatient Antibiotic Therapy Plan:    Please send referral to [facility].    1) Infection: osteomyelitis , bacteremia    2) Discharge Antibiotics:    Intravenous antibiotics:  Zosyn 13.5 gm continuous infusion     3) Therapy Duration:  6 weeks    Estimated end date of IV antibiotics: 7/31    4) Outpatient Weekly Labs:    Order the following labs to be drawn on Mondays:   CBC  CMP   CRP      5) Fax Lab Results to Infectious Diseases Provider:     [facility] Fax Number: [phone]    6) Outpatient Infectious Diseases Follow-up    Follow-up appointment will be arranged by the ID clinic and will be found in the patient's appointments tab.    Prior to discharge, please ensure the patient's follow-up has been scheduled.    If there is still no follow-up scheduled prior to discharge, please send an [employer] message to [facility] Clinical Pool or Call Infectious Diseases Dept.            I have reviewed hospital notes from  HTS service and other specialty providers. I have also reviewed CBC, CMP/BMP,  cultures and imaging with my interpretation as documented.

## 2025-06-25 NOTE — ASSESSMENT & PLAN NOTE
BG goal 140-180  S/p R toe amputation on 6/22.     Plan:  Continue Lantus 24 units once daily   Continue Novolog 8 units TID with meals   Low Dose Correction Scale  BG monitoring ac/hs    ** Please call Endocrine for any BG related issues **      Lab Results   Component Value Date    HGBA1C 12.4 (H) 06/20/2025     Bedside nurse to instruct on insulin pen use and blood sugar monitor. Have patient administer own injections after education completed supervised by nurse.    Discharge plans:  Likely will d/c with MDI regimen given poorly controlled A1c. Please notify Endocrine prior to d/c for updated recs  Lantus 24 units once daily  Novolog 8 units TID with meals in addition to the following correction scale prn ac/hs  150 - 200 + 1 unit  201 - 250 + 2 units  251 - 300 + 3 units  301 - 350 + 4 units   > 350   + 5 units    Patient would benefit from Dexcom at discharge if insurance approved. Will need follow up in Endocrine clinic and outpatient DM education scheduled at time of discharge.

## 2025-06-25 NOTE — PROGRESS NOTES
06/25/2025  Brenda Tavo    Current provider:  Mike Chauhan MD    Device interrogation:      6/25/2025     7:57 AM 6/25/2025     4:50 AM 6/25/2025    12:30 AM 6/24/2025     7:20 PM 6/24/2025     4:00 PM 6/24/2025    12:00 PM 6/24/2025     8:00 AM   TXP LVAD INTERROGATIONS   Type HeartMate3 HeartMate3 HeartMate3 HeartMate3 HeartMate3 HeartMate3 HeartMate3   Flow 4.5 4.3 4.4 4.3 4.6 4.4 4.2   Speed 5200 5200 5200 5200 5200 5200 5200   PI 6.2 5.7 6.6 6.2 6.3 6.8 6.2   Power (Edwards) 3.7 3.6 3.6 3.6 3.6 3.6 3.5   LSL 4800 4800 4800 4800 4800 4800 4800   Low Flow Alarm no no  no no no no   Pulsatility Pulse Intermittent pulse Pulse Pulse  Intermittent pulse Intermittent pulse          Rounded on [place] to ensure all mechanical assist device settings (IABP or VAD) were appropriate and all parameters were within limits.  I was able to ensure all back up equipment was present, the staff had no issues, and the Perfusion Department daily rounding was complete.      For implantable VADs: Interrogation of Ventricular assist device was performed with analysis of device parameters and review of device function. I have personally reviewed the interrogation findings and agree with findings as stated.     In emergency, the nursing units have been notified to contact the perfusion department either by:  Calling [phone] from 630am to 4pm Mon thru Fri, utilizing the On-Call Finder functionality of Epic and searching for Perfusion, or by contacting the hospital  from 4pm to 630am and on weekends and asking to speak with the perfusionist on call.    8:49 AM

## 2025-06-25 NOTE — PROGRESS NOTES
Rounded on patient; he is asleep. Per nurse, patient finally had a BM after no BM for 6 days. I did not awaken patient, will revisit on tomorrow.

## 2025-06-25 NOTE — PROGRESS NOTES
Update/Discharge planning    Pt presents in room aaox4 with open affect. Caregivers not present. Pt voices concern about how his foot is healing. Pt had already asked RN to have Podiatry see pt. Pt also with questions about discharge. Pt states family needs a day's notice to come get him. Pt will go with resumed HH with [facility] 2000. Pt will also need RW; referral sent to [facility]. Pt states he still has questions and will talk to the medical team when they see him today. Pt coping appropriately and denies further needs, questions, concerns at this time and none indicated. Providing ongoing psychosocial and counseling support, education, resources, assistance and discharge planning as indicated. Following and available.    Addendum: HH phoned SW stating they had spoken to pt's spouse, who lives separately from pt. Spouse informed them that pt's power and gas are shut off for lack of payment. Pt's dgtr to make a payment Friday, but it is not certain when it will be turned back on. SW informed medical team. Will f/u with pt tomorrow.

## 2025-06-25 NOTE — SUBJECTIVE & OBJECTIVE
"Interval HPI:   Overnight events: No acute events overnight. Patient in room 3098/3098 A. Blood glucose stable. BG at and below goal on current insulin regimen (SSI, prandial, and basal insulin ). Steroid use- None . 3 Days Post-Op  Renal function- Normal   Vasopressors-  None       Endocrine will continue to follow and manage insulin orders inpatient.         Diet Consistent Carbohydrate 2000 Calories (up to 75 gm per meal)     Eatin%  Nausea: No  Hypoglycemia and intervention: No  Fever: No  TPN and/or TF: No  If yes, type of TF/TPN and rate:  N/A    /70 (BP Location: Left arm, Patient Position: Lying)   Pulse 75   Temp 97.9 °F (36.6 °C) (Oral)   Resp 18   Ht 6' 2" (1.88 m)   Wt 73.5 kg (162 lb 0.6 oz)   SpO2 99%   BMI 20.80 kg/m²     Labs Reviewed and Include    Recent Labs   Lab 25  0246   *   CALCIUM 8.5*   ALBUMIN 2.0*   PROT 8.5*   *   K 4.2   CO2 24      BUN 22   CREATININE 1.2   ALKPHOS 257*   ALT 21   AST 47*   BILITOT 0.9     Lab Results   Component Value Date    WBC 12.91 (H) 2025    HGB 9.0 (L) 2025    HCT 28.3 (L) 2025    MCV 91 2025     2025     No results for input(s): "TSH", "FREET4" in the last 168 hours.  Lab Results   Component Value Date    HGBA1C 12.4 (H) 2025       Nutritional status:   Body mass index is 20.8 kg/m².  Lab Results   Component Value Date    ALBUMIN 2.0 (L) 2025    ALBUMIN 2.2 (L) 2025    ALBUMIN 2.6 (L) 2025     Lab Results   Component Value Date    PREALBUMIN 5 (L) 2025    PREALBUMIN 5 (L) 2025    PREALBUMIN 5 (L) 2025       Estimated Creatinine Clearance: 60.4 mL/min (based on SCr of 1.2 mg/dL).    Accu-Checks  Recent Labs     25  0737 25  1143 25  1552 25  0639 25  1145 25  1604 25  2037 25  0634 25  0706   POCTGLUCOSE 157* 192* 132* 108 157* 133* 92 161* 159* 142*       Current " Medications and/or Treatments Impacting Glycemic Control  Immunotherapy:    Immunosuppressants       None          Steroids:   Hormones (From admission, onward)      None          Pressors:    Autonomic Drugs (From admission, onward)      None          Hyperglycemia/Diabetes Medications:   Antihyperglycemics (From admission, onward)      Start     Stop Route Frequency Ordered    06/22/25 1130  insulin aspart U-100 pen 8 Units         -- SubQ 3 times daily with meals 06/22/25 0736    06/22/25 0900  insulin glargine U-100 (Lantus) pen 24 Units         -- SubQ Daily 06/22/25 0736    06/20/25 1449  insulin aspart U-100 pen 0-5 Units         -- SubQ Before meals & nightly PRN 06/20/25 1348

## 2025-06-25 NOTE — NURSING
RN instructed how check blood sugar and pt will need more instruction when discharged d/t glucometer from pharmacy will be different compare with  glucometer RN using in the unit. RN instruted pt how to use insulin pen and pt practiced with RN x5, pt feel confident and did steps correctly per protocol, then pt administer insulin with RN'supervision per order.

## 2025-06-25 NOTE — ASSESSMENT & PLAN NOTE
Procedure: Device Interrogation Including analysis of device parameters  Current Settings: Ventricular Assist Device  INR therapeutic, Continue coumadin  Review of device function is stable/unstable stable        6/25/2025     7:57 AM 6/25/2025     4:50 AM 6/25/2025    12:30 AM 6/24/2025     7:20 PM 6/24/2025     4:00 PM 6/24/2025    12:00 PM 6/24/2025     8:00 AM   TXP LVAD INTERROGATIONS   Type HeartMate3 HeartMate3 HeartMate3 HeartMate3 HeartMate3 HeartMate3 HeartMate3   Flow 4.5 4.3 4.4 4.3 4.6 4.4 4.2   Speed 5200 5200 5200 5200 5200 5200 5200   PI 6.2 5.7 6.6 6.2 6.3 6.8 6.2   Power (Edwards) 3.7 3.6 3.6 3.6 3.6 3.6 3.5   LSL 4800 4800 4800 4800 4800 4800 4800   Low Flow Alarm no no  no no no no   Pulsatility Pulse Intermittent pulse Pulse Pulse  Intermittent pulse Intermittent pulse

## 2025-06-25 NOTE — ASSESSMENT & PLAN NOTE
"69M with h/o T2DM, HTN, CHF s/p HM3 implantation 2021 admitted 6/20 with generalized weakness, inability to "hold his urine" and hyperglycemia. Found to be febrile with toe wound. S/p ray amputation R toe  6/22, purulence noted. Bcx with e.coli (pan susceptible). Prox margin path/cultures pending (enterococcus ). Fevers resolved. Wbc trending down. changed Ceftriaxone --> zosyn when enterococcus started growing    Suspect toe infection was source of bacteremia, risk factor wet shoes. Appreciate podiatry assistance with source control. Quite possible foot infection polymicrobial, but awaiting final results of culture (so far enterococcus only, suspectibilities pending) . suspect he has residual infection in foot so will likely need 6 week course of abx     Recommendations:   - continue zosyn. Plan for 6 week course (est end date 7/31)  - please notify ID of any new growth on cultures    Outpatient Antibiotic Therapy Plan:    Please send referral to [facility].    1) Infection: osteomyelitis , bacteremia    2) Discharge Antibiotics:    Intravenous antibiotics:  Zosyn 13.5 gm continuous infusion     3) Therapy Duration:  6 weeks    Estimated end date of IV antibiotics: 7/31    4) Outpatient Weekly Labs:    Order the following labs to be drawn on Mondays:   CBC  CMP   CRP      5) Fax Lab Results to Infectious Diseases Provider:     [facility] Fax Number: [phone]    6) Outpatient Infectious Diseases Follow-up    Follow-up appointment will be arranged by the ID clinic and will be found in the patient's appointments tab.    Prior to discharge, please ensure the patient's follow-up has been scheduled.    If there is still no follow-up scheduled prior to discharge, please send an [employer] message to [facility] Clinical Pool or Call Infectious Diseases Dept.            I have reviewed hospital notes from  HTS service and other specialty providers. I have also reviewed CBC, CMP/BMP,  cultures " and imaging with my interpretation as documented.

## 2025-06-25 NOTE — PROGRESS NOTES
[facility] - Cardiology Stepdown  Heart Transplant  Progress Note    Patient Name: Rocco France  MRN: [id number]  Admission Date: 6/20/2025  Hospital Length of Stay: 5 days  Attending Physician: Mike Chauhan MD  Primary Care Provider: Jay Guardado DO  Principal Problem:Type 2 diabetes mellitus    Subjective:   Interval History: No events overnight. E Coli bacteremia pending ID final recs. Patient appears euvolemic        Continuous Infusions:  Scheduled Meds:   amLODIPine  10 mg Oral Daily    atorvastatin  80 mg Oral Daily    insulin aspart U-100  8 Units Subcutaneous TIDWM    insulin glargine U-100  24 Units Subcutaneous Daily    magnesium citrate  296 mL Oral Once    mirtazapine  30 mg Oral QHS    mupirocin   Nasal BID    piperacillin-tazobactam (Zosyn) IV (PEDS and ADULTS) (extended infusion is not appropriate)  4.5 g Intravenous Q8H    polyethylene glycol  17 g Oral Daily    spironolactone  25 mg Oral Daily    warfarin  2.5 mg Oral Every Tues, Thurs, Sat, Sun    warfarin  5 mg Oral Every Mon, Wed, Fri     PRN Meds:  Current Facility-Administered Medications:     bisacodyL, 10 mg, Rectal, Daily PRN    chlorproMAZINE, 25 mg, Oral, TID PRN    dextrose 50%, 12.5 g, Intravenous, PRN    dextrose 50%, 25 g, Intravenous, PRN    fentaNYL, 25 mcg, Intravenous, Q5 Min PRN    glucagon (human recombinant), 1 mg, Intramuscular, PRN    glucose, 16 g, Oral, PRN    glucose, 24 g, Oral, PRN    insulin aspart U-100, 0-5 Units, Subcutaneous, QID (AC + HS) PRN    ondansetron, 4 mg, Intravenous, Daily PRN    Review of patient's allergies indicates:   Allergen Reactions    Pcn [penicillins] Hives     Objective:     Vital Signs (Most Recent):  Temp: 97.9 °F (36.6 °C) (06/25/25 0750)  Pulse: 102 (06/25/25 1121)  Resp: 18 (06/25/25 0750)  BP: 102/70 (06/25/25 0755)  SpO2: 99 % (06/25/25 0750) Vital Signs (24h Range):  Temp:  [97.9 °F (36.6 °C)-98.6 °F (37 °C)] 97.9 °F (36.6 °C)  Pulse:  [] 102  Resp:  [16-18] 18  SpO2:  [99  %-100 %] 99 %  BP: ()/(0-71) 102/70     Patient Vitals for the past 72 hrs (Last 3 readings):   Weight   06/25/25 0755 73.5 kg (162 lb 0.6 oz)   06/25/25 0645 74.4 kg (164 lb 0.4 oz)   06/24/25 0900 73.6 kg (162 lb 4.1 oz)     Body mass index is 20.8 kg/m².      Intake/Output Summary (Last 24 hours) at 6/25/2025 1201  Last data filed at 6/25/2025 0657  Gross per 24 hour   Intake 684 ml   Output 1100 ml   Net -416 ml       Hemodynamic Parameters:       Telemetry: reviewed       Physical Exam  Vitals reviewed.   Constitutional:       Appearance: He is normal weight.   HENT:      Head: Normocephalic and atraumatic.      Right Ear: External ear normal.      Left Ear: External ear normal.      Nose: Nose normal.      Mouth/Throat:      Mouth: Mucous membranes are moist.      Pharynx: Oropharynx is clear.   Eyes:      Conjunctiva/sclera: Conjunctivae normal.   Cardiovascular:      Rate and Rhythm: Normal rate and regular rhythm.      Pulses: Normal pulses.   Pulmonary:      Effort: Pulmonary effort is normal.      Breath sounds: Normal breath sounds.   Abdominal:      General: Abdomen is flat. Bowel sounds are normal.      Palpations: Abdomen is soft.   Musculoskeletal:      Cervical back: Normal range of motion.      Right lower leg: No edema.      Left lower leg: No edema.   Skin:     General: Skin is warm.      Capillary Refill: Capillary refill takes less than 2 seconds.   Neurological:      Mental Status: He is alert. Mental status is at baseline.   Psychiatric:         Mood and Affect: Mood normal.         Behavior: Behavior normal.            Significant Labs:  CBC:  Recent Labs   Lab 06/23/25  0646 06/24/25  0258 06/25/25  0246   WBC 12.64 13.49* 12.91*   RBC 3.56* 3.27* 3.12*   HGB 10.4* 9.4* 9.0*   HCT 31.8* 29.6* 28.3*    225 235   MCV 89 91 91   MCH 29.2 28.7 28.8   MCHC 32.7 31.8* 31.8*     BNP:  Recent Labs   Lab 06/20/25  0959 06/23/25  0646 06/25/25  0246   * 270* 424*     CMP:  Recent  Labs   Lab 06/20/25  0959 06/20/25  1404 06/23/25  0646 06/23/25  0851 06/24/25  0258 06/25/25  0246   GLU  --    < >  --  243* 143* 125*   CALCIUM  --    < >  --  9.0 8.8 8.5*   ALBUMIN 2.6*  --  2.2*  --   --  2.0*   PROT 9.5*  --  9.0*  --   --  8.5*   NA  --    < >  --  133* 133* 133*   K  --    < >  --  4.6 4.2 4.2   CO2  --    < >  --  27 24 24   CL  --    < >  --  98 100 100   BUN  --    < >  --  28* 27* 22   CREATININE  --    < >  --  1.3 1.3 1.2   ALKPHOS 216*  --  207*  --   --  257*   ALT 19  --  15  --   --  21   AST 42  --  28  --   --  47*   BILITOT 2.7*  --  1.1*  --   --  0.9    < > = values in this interval not displayed.      Coagulation:   Recent Labs   Lab 06/23/25  0646 06/24/25  0258 06/25/25  0246   INR 1.5* 1.6* 1.6*   APTT 34.7* 34.6* 35.6*     LDH:  Recent Labs   Lab 06/23/25  0646 06/24/25  0258 06/25/25  0246    204 211     Microbiology:  Microbiology Results (last 7 days)       Procedure Component Value Units Date/Time    Blood culture [[id number]]  (Normal) Collected: 06/23/25 0646    Order Status: Completed Specimen: Blood from Peripheral, Antecubital, Right Updated: 06/25/25 0901     Blood Culture No Growth After 48 Hours    Culture, Anaerobic [[id number]] Collected: 06/22/25 1450    Order Status: Completed Specimen: Bone from Toe, Right Foot Updated: 06/24/25 1055     Anaerobe Culture Culture In Progress    Aerobic culture [[id number]]  (Abnormal) Collected: 06/22/25 1450    Order Status: Completed Specimen: Bone from Toe, Right Foot Updated: 06/24/25 0719     CULTURE, AEROBIC Few Enterococcus species     Comment: Identification and susceptibility pending       Afb Culture Stain [[id number]] Collected: 06/22/25 1450    Order Status: Completed Specimen: Bone from Toe, Right Foot Updated: 06/23/25 1518     ACID FAST STAIN  No acid fast bacilli seen    Fungus culture [[id number]]  (Normal) Collected: 06/22/25 1450    Order Status: Completed Specimen: Bone from Toe, Right Foot  Updated: 06/23/25 1009     Fungal Culture Culture In Progress    Gram stain [[id number]] Collected: 06/22/25 1450    Order Status: Completed Specimen: Bone from Toe, Right Foot Updated: 06/22/25 1857     GRAM STAIN No WBCs, epithelial cells or organisms seen    AFB Culture & Smear [[id number]] Collected: 06/22/25 1450    Order Status: Resulted Specimen: Bone from Toe, Right Foot Updated: 06/22/25 1640    Culture, Anaerobe [[id number]]     Order Status: Sent Specimen: Skin     Aerobic culture [[id number]]     Order Status: Sent Specimen: Skin             I have reviewed all pertinent labs within the past 24 hours.    Estimated Creatinine Clearance: 60.4 mL/min (based on SCr of 1.2 mg/dL).    Diagnostic Results:  I have reviewed and interpreted all pertinent imaging results/findings within the past 24 hours.  Assessment and Plan:     No notes on file    * Type 2 diabetes mellitus  Pt presenting with hyperglycemia of nearly 600. Pt dry on exam on AM eval, overnight initiated on diuresis with IVP, discontinued this morning.   D/w Endocrine, given downtrending glucose levels, will initiate scheduled insulin.   -po intake per SLP recommendations - diabetic diet ordered   -endocrine following, appreciate recs.     Acute osteomyelitis of toe, right  Likely source for septic shock on presentation.  -podiatry seeing pt, plan for OR 6/22 . Appreciate recs   -cont rocephin, ID following, appreciate recs  -cont holding diuretics     E coli bacteremia  -cont Zosyn  -ID following, appreciate recs  -R toe osteomyelitis is most likely source     Shock  Pt initially presented with elevated lactic and elevated glucose, elevated serum osm but not quite to level of HHS. DKA workup negative. No pH for eval completed. Septic shock with source most likely R foot.     -endocrine following, appreciate recs  -stop diuresis, cont to hold   -monitor renal function   -podiatry c/s, appreciate recs, OR 6/22 for partial amputation        Anticoagulated  -cont warfarin per INR   -pharmacy assisting with dosing     LVAD (left ventricular assist device) present  Procedure: Device Interrogation Including analysis of device parameters  Current Settings: Ventricular Assist Device  INR therapeutic, Continue coumadin  Review of device function is stable/unstable stable        6/25/2025     7:57 AM 6/25/2025     4:50 AM 6/25/2025    12:30 AM 6/24/2025     7:20 PM 6/24/2025     4:00 PM 6/24/2025    12:00 PM 6/24/2025     8:00 AM   TXP LVAD INTERROGATIONS   Type HeartMate3 HeartMate3 HeartMate3 HeartMate3 HeartMate3 HeartMate3 HeartMate3   Flow 4.5 4.3 4.4 4.3 4.6 4.4 4.2   Speed 5200 5200 5200 5200 5200 5200 5200   PI 6.2 5.7 6.6 6.2 6.3 6.8 6.2   Power (Edwards) 3.7 3.6 3.6 3.6 3.6 3.6 3.5   LSL 4800 4800 4800 4800 4800 4800 4800   Low Flow Alarm no no  no no no no   Pulsatility Pulse Intermittent pulse Pulse Pulse  Intermittent pulse Intermittent pulse             Yohan Nunn PA-C  Heart Transplant  [facility] - Cardiology Stepdown

## 2025-06-25 NOTE — PLAN OF CARE
[facility]   Heart Transplant/VAD Clinic   [address] [place] [address]   [phone] after hours          [phone] fax     VAD HOME  HEALTH ORDERS      Admit to Home Health    Diagnosis: Problem List[1]    Patient is homebound due to:  NYHA Class IV HF. S/P LVAD placement.     Diet: Low Fat, Low cholesterol, 2Gm Na, Coumadin restrictions.    Acitivities: No Swimming, bathing, vacuuming, contact sports.    Fresh implants= Sternal Precautions    Nursing:   SN to complete comprehensive assessment including routine vital signs. Instruct on disease process and s/s of complications to report to MD. Review/verify medication list sent home with the patient at time of discharge  and instruct patient/caregiver as needed. Frequency may be adjusted depending on start of care date.    Wound care routine   Routine, Every Mon-Wed-Fri @ 1400, First occurrence on Mon 6/23/25 at 1400, Until Specified Cleansed foot with Vashe or sterile normal saline. Xeroform overlying right foot incision, gauze padding, secured with Kerlix, Ace. Wound 06/22/25 1428 Incision Right Toe, first    **LVAD driveline exit site dressing change is to be completed per LVAD patient/caregiver only**.    Notify MD if:  SBP > 120 or < 80;   MAP > 90 or < 70;   HR > 120 or < 60;   Temp > 101;   Weight gain >3lbs in 1 day or 5lbs in 1 week.    LABS:  SN to perform labs: PT/INR per Coumadin clinic [phone].   Weekly bmp, mag, cbc.     Follow up INR date: Per coumadin clinic  No Finger Sticks      CONSULTS:      Physical Therapy to evaluate and treat. Evaluate for home safety and equipment needs; Establish/upgrade home exercise program. Perform / instruct on therapeutic exercises, gait training, transfer training, and Range of Motion.    Occupational Therapy to evaluate and treat. Evaluate home environment for safety and equipment needs. Perform/Instruct on transfers, ADL training, ROM, and  therapeutic exercises.    Send initial Home Health orders to HTS attending physician on call.  Send follow up questions to VAD clinic MD [phone] or [phone].          [1]   Patient Active Problem List  Diagnosis    ICD (implantable cardioverter-defibrillator) in place    NICM (nonischemic cardiomyopathy)    Leg pain, left    Essential hypertension    Type 2 diabetes mellitus    Hyperlipidemia    Acute on chronic combined systolic and diastolic heart failure    CAD (coronary artery disease)    Counseling regarding advanced care planning and goals of care    LVAD (left ventricular assist device) present    Anticoagulated    Bilateral subdural hematomas    PSVT (paroxysmal supraventricular tachycardia)    Chronic systolic congestive heart failure    Subarachnoid hemorrhage    Dizziness    Diplopia    Atrial fibrillation    Iron deficiency anemia due to chronic blood loss    Subdural hematoma, nontraumatic    Normocytic anemia    Muscle weakness of right upper extremity    Tobacco dependency    Shock    Moderate malnutrition    E coli bacteremia    Diabetic ulcer of right foot    Acute osteomyelitis of toe, right

## 2025-06-26 LAB
ABSOLUTE EOSINOPHIL (OHS): 0.33 K/UL
ABSOLUTE MONOCYTE (OHS): 1.1 K/UL (ref 0.3–1)
ABSOLUTE NEUTROPHIL COUNT (OHS): 7.71 K/UL (ref 1.8–7.7)
ANION GAP (OHS): 7 MMOL/L (ref 8–16)
APTT PPP: 33.9 SECONDS (ref 21–32)
BACTERIA SPEC ANAEROBE CULT: NORMAL
BASOPHILS # BLD AUTO: 0.04 K/UL
BASOPHILS NFR BLD AUTO: 0.4 %
BUN SERPL-MCNC: 19 MG/DL (ref 8–23)
CALCIUM SERPL-MCNC: 8.3 MG/DL (ref 8.7–10.5)
CHLORIDE SERPL-SCNC: 104 MMOL/L (ref 95–110)
CO2 SERPL-SCNC: 24 MMOL/L (ref 23–29)
CREAT SERPL-MCNC: 1.2 MG/DL (ref 0.5–1.4)
ERYTHROCYTE [DISTWIDTH] IN BLOOD BY AUTOMATED COUNT: 14.4 % (ref 11.5–14.5)
GFR SERPLBLD CREATININE-BSD FMLA CKD-EPI: >60 ML/MIN/1.73/M2
GLUCOSE SERPL-MCNC: 130 MG/DL (ref 70–110)
HCT VFR BLD AUTO: 29.3 % (ref 40–54)
HGB BLD-MCNC: 9.2 GM/DL (ref 14–18)
IMM GRANULOCYTES # BLD AUTO: 0.11 K/UL (ref 0–0.04)
IMM GRANULOCYTES NFR BLD AUTO: 1 % (ref 0–0.5)
INR PPP: 1.5 (ref 0.8–1.2)
LDH SERPL-CCNC: 217 U/L (ref 110–260)
LYMPHOCYTES # BLD AUTO: 1.83 K/UL (ref 1–4.8)
MAGNESIUM SERPL-MCNC: 2 MG/DL (ref 1.6–2.6)
MCH RBC QN AUTO: 28.4 PG (ref 27–31)
MCHC RBC AUTO-ENTMCNC: 31.4 G/DL (ref 32–36)
MCV RBC AUTO: 90 FL (ref 82–98)
NUCLEATED RBC (/100WBC) (OHS): 0 /100 WBC
OHS QRS DURATION: 180 MS
OHS QTC CALCULATION: 654 MS
PHOSPHATE SERPL-MCNC: 2.5 MG/DL (ref 2.7–4.5)
PLATELET # BLD AUTO: 238 K/UL (ref 150–450)
PMV BLD AUTO: 10.8 FL (ref 9.2–12.9)
POCT GLUCOSE: 110 MG/DL (ref 70–110)
POCT GLUCOSE: 137 MG/DL (ref 70–110)
POCT GLUCOSE: 156 MG/DL (ref 70–110)
POCT GLUCOSE: 159 MG/DL (ref 70–110)
POCT GLUCOSE: 83 MG/DL (ref 70–110)
POTASSIUM SERPL-SCNC: 4.1 MMOL/L (ref 3.5–5.1)
PROTHROMBIN TIME: 15.6 SECONDS (ref 9–12.5)
RBC # BLD AUTO: 3.24 M/UL (ref 4.6–6.2)
RELATIVE EOSINOPHIL (OHS): 3 %
RELATIVE LYMPHOCYTE (OHS): 16.5 % (ref 18–48)
RELATIVE MONOCYTE (OHS): 9.9 % (ref 4–15)
RELATIVE NEUTROPHIL (OHS): 69.2 % (ref 38–73)
SODIUM SERPL-SCNC: 135 MMOL/L (ref 136–145)
WBC # BLD AUTO: 11.12 K/UL (ref 3.9–12.7)

## 2025-06-26 PROCEDURE — 99232 SBSQ HOSP IP/OBS MODERATE 35: CPT | Mod: ,,, | Performed by: INTERNAL MEDICINE

## 2025-06-26 PROCEDURE — 25000003 PHARM REV CODE 250: Performed by: BEHAVIOR TECHNICIAN

## 2025-06-26 PROCEDURE — 36415 COLL VENOUS BLD VENIPUNCTURE: CPT | Performed by: BEHAVIOR TECHNICIAN

## 2025-06-26 PROCEDURE — 99232 SBSQ HOSP IP/OBS MODERATE 35: CPT | Mod: ,,, | Performed by: PHYSICIAN ASSISTANT

## 2025-06-26 PROCEDURE — 25000003 PHARM REV CODE 250: Performed by: INTERNAL MEDICINE

## 2025-06-26 PROCEDURE — 99233 SBSQ HOSP IP/OBS HIGH 50: CPT | Mod: GC,,, | Performed by: PHYSICIAN ASSISTANT

## 2025-06-26 PROCEDURE — 94761 N-INVAS EAR/PLS OXIMETRY MLT: CPT

## 2025-06-26 PROCEDURE — 85025 COMPLETE CBC W/AUTO DIFF WBC: CPT | Performed by: BEHAVIOR TECHNICIAN

## 2025-06-26 PROCEDURE — 20600001 HC STEP DOWN PRIVATE ROOM

## 2025-06-26 PROCEDURE — 83615 LACTATE (LD) (LDH) ENZYME: CPT | Performed by: STUDENT IN AN ORGANIZED HEALTH CARE EDUCATION/TRAINING PROGRAM

## 2025-06-26 PROCEDURE — G0545 PR VISIT INHERENT TO INPT OR OBS CARE, INFECTIOUS DISEASE: HCPCS | Mod: ,,, | Performed by: INTERNAL MEDICINE

## 2025-06-26 PROCEDURE — 83735 ASSAY OF MAGNESIUM: CPT | Performed by: STUDENT IN AN ORGANIZED HEALTH CARE EDUCATION/TRAINING PROGRAM

## 2025-06-26 PROCEDURE — 93005 ELECTROCARDIOGRAM TRACING: CPT

## 2025-06-26 PROCEDURE — 80048 BASIC METABOLIC PNL TOTAL CA: CPT | Performed by: BEHAVIOR TECHNICIAN

## 2025-06-26 PROCEDURE — 85730 THROMBOPLASTIN TIME PARTIAL: CPT | Performed by: STUDENT IN AN ORGANIZED HEALTH CARE EDUCATION/TRAINING PROGRAM

## 2025-06-26 PROCEDURE — 25000003 PHARM REV CODE 250: Performed by: STUDENT IN AN ORGANIZED HEALTH CARE EDUCATION/TRAINING PROGRAM

## 2025-06-26 PROCEDURE — 27000248 HC VAD-ADDITIONAL DAY

## 2025-06-26 PROCEDURE — 84100 ASSAY OF PHOSPHORUS: CPT | Performed by: STUDENT IN AN ORGANIZED HEALTH CARE EDUCATION/TRAINING PROGRAM

## 2025-06-26 PROCEDURE — 63600175 PHARM REV CODE 636 W HCPCS: Performed by: INTERNAL MEDICINE

## 2025-06-26 PROCEDURE — 25000003 PHARM REV CODE 250: Performed by: PHYSICIAN ASSISTANT

## 2025-06-26 PROCEDURE — 93010 ELECTROCARDIOGRAM REPORT: CPT | Mod: ,,, | Performed by: INTERNAL MEDICINE

## 2025-06-26 PROCEDURE — 93750 INTERROGATION VAD IN PERSON: CPT | Mod: ,,, | Performed by: INTERNAL MEDICINE

## 2025-06-26 PROCEDURE — 85610 PROTHROMBIN TIME: CPT | Performed by: STUDENT IN AN ORGANIZED HEALTH CARE EDUCATION/TRAINING PROGRAM

## 2025-06-26 RX ORDER — ACETAMINOPHEN 325 MG/1
650 TABLET ORAL EVERY 6 HOURS PRN
Status: DISCONTINUED | OUTPATIENT
Start: 2025-06-26 | End: 2025-06-30 | Stop reason: HOSPADM

## 2025-06-26 RX ADMIN — ATORVASTATIN CALCIUM 80 MG: 40 TABLET, FILM COATED ORAL at 08:06

## 2025-06-26 RX ADMIN — PIPERACILLIN SODIUM AND TAZOBACTAM SODIUM 4.5 G: 4; .5 INJECTION, POWDER, FOR SOLUTION INTRAVENOUS at 10:06

## 2025-06-26 RX ADMIN — INSULIN ASPART 7 UNITS: 100 INJECTION, SOLUTION INTRAVENOUS; SUBCUTANEOUS at 08:06

## 2025-06-26 RX ADMIN — PIPERACILLIN SODIUM AND TAZOBACTAM SODIUM 4.5 G: 4; .5 INJECTION, POWDER, FOR SOLUTION INTRAVENOUS at 02:06

## 2025-06-26 RX ADMIN — SPIRONOLACTONE 25 MG: 25 TABLET, FILM COATED ORAL at 09:06

## 2025-06-26 RX ADMIN — PIPERACILLIN SODIUM AND TAZOBACTAM SODIUM 4.5 G: 4; .5 INJECTION, POWDER, FOR SOLUTION INTRAVENOUS at 06:06

## 2025-06-26 RX ADMIN — POLYETHYLENE GLYCOL 3350 17 G: 17 POWDER, FOR SOLUTION ORAL at 08:06

## 2025-06-26 RX ADMIN — INSULIN ASPART 7 UNITS: 100 INJECTION, SOLUTION INTRAVENOUS; SUBCUTANEOUS at 12:06

## 2025-06-26 RX ADMIN — INSULIN GLARGINE 24 UNITS: 100 INJECTION, SOLUTION SUBCUTANEOUS at 08:06

## 2025-06-26 RX ADMIN — MIRTAZAPINE 30 MG: 30 TABLET, FILM COATED ORAL at 08:06

## 2025-06-26 RX ADMIN — WARFARIN SODIUM 2.5 MG: 2.5 TABLET ORAL at 04:06

## 2025-06-26 RX ADMIN — MUPIROCIN: 20 OINTMENT TOPICAL at 08:06

## 2025-06-26 RX ADMIN — ACETAMINOPHEN 650 MG: 325 TABLET ORAL at 07:06

## 2025-06-26 RX ADMIN — AMLODIPINE BESYLATE 10 MG: 10 TABLET ORAL at 08:06

## 2025-06-26 RX ADMIN — INSULIN ASPART 7 UNITS: 100 INJECTION, SOLUTION INTRAVENOUS; SUBCUTANEOUS at 04:06

## 2025-06-26 NOTE — ASSESSMENT & PLAN NOTE
-cont Zosyn for enterococcus/E coli bacteremia  -ID following, appreciate recs  -R toe osteomyelitis is most likely source   -Needs 6 week course of IV zosyn for the enterococcus bacteremia, concerns over the safety of sending home with PICC line and IV abx discussing if any PO alternatives

## 2025-06-26 NOTE — NURSING
Notified .PA pt c/o 2/10 Headache, tylenol ordered and given, doppler 78, map 84, HR 81. WCTM. 0902: Pt said headache went away.

## 2025-06-26 NOTE — NURSING
Rt foot dressing changed per order, see flowsheet, picture taken in epic media, secure chat to .

## 2025-06-26 NOTE — ASSESSMENT & PLAN NOTE
Procedure: Device Interrogation Including analysis of device parameters  Current Settings: Ventricular Assist Device  INR 1.5, will continue coumadin  INR therapeutic, Continue coumadin  Review of device function is stable/unstable stable        6/26/2025    11:56 AM 6/26/2025     7:42 AM 6/26/2025     3:39 AM 6/25/2025    11:47 PM 6/25/2025     7:30 PM 6/25/2025     4:00 PM 6/25/2025    12:00 PM   TXP LVAD INTERROGATIONS   Type HeartMate3 HeartMate3 HeartMate3 HeartMate3 HeartMate3 HeartMate3 HeartMate3   Flow 4.4 4.5 4.4 4.3 4.3 4.5 4.6   Speed 5200 5200 5200 5200 5250 5200 5200   PI 6.1 6.2 6.4 6.3 6.9 5.8 6   Power (Edwards) 3.6 3.6 3.6 3.5 3.6 3.6 3.6   LSL 4800 4800 4800 4800 4850 4800 4800   Low Flow Alarm no no no no no no no   Pulsatility Pulse Pulse Pulse Pulse Pulse Pulse Pulse

## 2025-06-26 NOTE — SUBJECTIVE & OBJECTIVE
Interval History: No events overnight. Cultures have procesed w/ E coli and enterococcus faecalis. Needs 6 week course of IV zosyn for the enterococcus bacteremia.    Continuous Infusions:  Scheduled Meds:   amLODIPine  10 mg Oral Daily    atorvastatin  80 mg Oral Daily    insulin aspart U-100  7 Units Subcutaneous TIDWM    insulin glargine U-100  24 Units Subcutaneous Daily    magnesium citrate  296 mL Oral Once    mirtazapine  30 mg Oral QHS    mupirocin   Nasal BID    piperacillin-tazobactam (Zosyn) IV (PEDS and ADULTS) (extended infusion is not appropriate)  4.5 g Intravenous Q8H    polyethylene glycol  17 g Oral Daily    spironolactone  25 mg Oral Daily    warfarin  2.5 mg Oral Every Tues, Thurs, Sat, Sun    warfarin  5 mg Oral Every Mon, Wed, Fri     PRN Meds:  Current Facility-Administered Medications:     acetaminophen, 650 mg, Oral, Q6H PRN    bisacodyL, 10 mg, Rectal, Daily PRN    chlorproMAZINE, 25 mg, Oral, TID PRN    dextrose 50%, 12.5 g, Intravenous, PRN    dextrose 50%, 25 g, Intravenous, PRN    fentaNYL, 25 mcg, Intravenous, Q5 Min PRN    glucagon (human recombinant), 1 mg, Intramuscular, PRN    glucose, 16 g, Oral, PRN    glucose, 24 g, Oral, PRN    insulin aspart U-100, 0-5 Units, Subcutaneous, QID (AC + HS) PRN    ondansetron, 4 mg, Intravenous, Daily PRN    Review of patient's allergies indicates:   Allergen Reactions    Pcn [penicillins] Hives     Objective:     Vital Signs (Most Recent):  Temp: 98.1 °F (36.7 °C) (06/26/25 1145)  Pulse: 79 (06/26/25 1145)  Resp: 18 (06/26/25 1145)  BP: 93/66 (06/26/25 1145)  SpO2: 98 % (06/26/25 1145) Vital Signs (24h Range):  Temp:  [97.8 °F (36.6 °C)-98.5 °F (36.9 °C)] 98.1 °F (36.7 °C)  Pulse:  [] 79  Resp:  [18] 18  SpO2:  [98 %-99 %] 98 %  BP: ()/(0-83) 93/66     Patient Vitals for the past 72 hrs (Last 3 readings):   Weight   06/26/25 0615 72.8 kg (160 lb 7.9 oz)   06/25/25 0755 73.5 kg (162 lb 0.6 oz)   06/25/25 0645 74.4 kg (164 lb 0.4 oz)      Body mass index is 20.61 kg/m².      Intake/Output Summary (Last 24 hours) at 6/26/2025 1206  Last data filed at 6/26/2025 0120  Gross per 24 hour   Intake 444 ml   Output 975 ml   Net -531 ml       Hemodynamic Parameters:       Telemetry: reviewed       Physical Exam  Vitals reviewed.   Constitutional:       Appearance: He is normal weight.   HENT:      Head: Normocephalic and atraumatic.      Right Ear: External ear normal.      Left Ear: External ear normal.      Nose: Nose normal.      Mouth/Throat:      Mouth: Mucous membranes are moist.      Pharynx: Oropharynx is clear.   Eyes:      Conjunctiva/sclera: Conjunctivae normal.   Cardiovascular:      Rate and Rhythm: Normal rate and regular rhythm.      Pulses: Normal pulses.   Pulmonary:      Effort: Pulmonary effort is normal.      Breath sounds: Normal breath sounds.   Abdominal:      General: Abdomen is flat. Bowel sounds are normal.      Palpations: Abdomen is soft.   Musculoskeletal:      Cervical back: Normal range of motion.      Right lower leg: No edema.      Left lower leg: No edema.   Skin:     General: Skin is warm.      Capillary Refill: Capillary refill takes less than 2 seconds.   Neurological:      Mental Status: He is alert. Mental status is at baseline.   Psychiatric:         Mood and Affect: Mood normal.         Behavior: Behavior normal.            Significant Labs:  CBC:  Recent Labs   Lab 06/24/25  0258 06/25/25  0246 06/26/25  0324   WBC 13.49* 12.91* 11.12   RBC 3.27* 3.12* 3.24*   HGB 9.4* 9.0* 9.2*   HCT 29.6* 28.3* 29.3*    235 238   MCV 91 91 90   MCH 28.7 28.8 28.4   MCHC 31.8* 31.8* 31.4*     BNP:  Recent Labs   Lab 06/20/25  0959 06/23/25  0646 06/25/25  0246   * 270* 424*     CMP:  Recent Labs   Lab 06/20/25  0959 06/20/25  1404 06/23/25  0646 06/23/25  0851 06/24/25  0258 06/25/25  0246 06/26/25  0324   GLU  --    < >  --    < > 143* 125* 130*   CALCIUM  --    < >  --    < > 8.8 8.5* 8.3*   ALBUMIN 2.6*  --  2.2*   --   --  2.0*  --    PROT 9.5*  --  9.0*  --   --  8.5*  --    NA  --    < >  --    < > 133* 133* 135*   K  --    < >  --    < > 4.2 4.2 4.1   CO2  --    < >  --    < > 24 24 24   CL  --    < >  --    < > 100 100 104   BUN  --    < >  --    < > 27* 22 19   CREATININE  --    < >  --    < > 1.3 1.2 1.2   ALKPHOS 216*  --  207*  --   --  257*  --    ALT 19  --  15  --   --  21  --    AST 42  --  28  --   --  47*  --    BILITOT 2.7*  --  1.1*  --   --  0.9  --     < > = values in this interval not displayed.      Coagulation:   Recent Labs   Lab 06/24/25  0258 06/25/25  0246 06/26/25  0324   INR 1.6* 1.6* 1.5*   APTT 34.6* 35.6* 33.9*     LDH:  Recent Labs   Lab 06/24/25  0258 06/25/25  0246 06/26/25  0324    211 217     Microbiology:  Microbiology Results (last 7 days)       Procedure Component Value Units Date/Time    Culture, Anaerobic [[id number]] Collected: 06/22/25 1450    Order Status: Completed Specimen: Bone from Toe, Right Foot Updated: 06/26/25 1056     Anaerobe Culture No Anaerobes Isolated    Blood culture [[id number]]  (Normal) Collected: 06/23/25 0646    Order Status: Completed Specimen: Blood from Peripheral, Antecubital, Right Updated: 06/26/25 0901     Blood Culture No Growth After 72 Hours    Aerobic culture [[id number]]  (Abnormal)  (Susceptibility) Collected: 06/22/25 1450    Order Status: Completed Specimen: Bone from Toe, Right Foot Updated: 06/25/25 1235     CULTURE, AEROBIC Few Enterococcus faecalis    Afb Culture Stain [[id number]] Collected: 06/22/25 1450    Order Status: Completed Specimen: Bone from Toe, Right Foot Updated: 06/23/25 1518     ACID FAST STAIN  No acid fast bacilli seen    Fungus culture [[id number]]  (Normal) Collected: 06/22/25 1450    Order Status: Completed Specimen: Bone from Toe, Right Foot Updated: 06/23/25 1009     Fungal Culture Culture In Progress    Gram stain [[id number]] Collected: 06/22/25 1450    Order Status: Completed Specimen: Bone from Toe, Right Foot  Updated: 06/22/25 1857     GRAM STAIN No WBCs, epithelial cells or organisms seen    AFB Culture & Smear [[id number]] Collected: 06/22/25 1450    Order Status: Resulted Specimen: Bone from Toe, Right Foot Updated: 06/22/25 1640    Culture, Anaerobe [[id number]]     Order Status: Sent Specimen: Skin     Aerobic culture [[id number]]     Order Status: Sent Specimen: Skin             I have reviewed all pertinent labs within the past 24 hours.    Estimated Creatinine Clearance: 59.8 mL/min (based on SCr of 1.2 mg/dL).    Diagnostic Results:  I have reviewed and interpreted all pertinent imaging results/findings within the past 24 hours.

## 2025-06-26 NOTE — ASSESSMENT & PLAN NOTE
- Admitted for right cerebral acute subdural hematoma and acute basal cistern subarachnoid hemorrhage  - Implant Date: 01/13/2021  - Speed: 5200  - Low Flow Events: None noted  - Interval History was obtained from team member during rounding today.  - VAD/SERGEI teaching was performed with patient.  - Mobilization/Physical Therapy is ongoing.  - Anticoagulation: Coumadin  - INR: 1.5  - Urine Output: 1275  - BUN: 19  - Creat: 1.2  - LDH: 211  - WBC 11.12  - GNR in Bcx; source of infection found to be an infected toe   - Enterococcus species noted in blood cultures from 6/22  - Partial amputation with podiatry on 6/22   - Vancomycin/Rocephin per ID recommendations    More than 50 percent of the care dominated counseling and coordinating care with different team members. The VAD was interrogated and no significant findings were found in the history. All these findings are documented in the note above.

## 2025-06-26 NOTE — ASSESSMENT & PLAN NOTE
"69M with h/o T2DM, HTN, CHF s/p HM3 implantation 2021 admitted 6/20 with generalized weakness, inability to "hold his urine" and hyperglycemia. Found to be febrile with toe wound. S/p ray amputation R toe  6/22, purulence noted. Bcx with e.coli (pan susceptible). Prox margin path/cultures pending (enterococcus ). Fevers resolved. Wbc trending down. changed Ceftriaxone --> zosyn when enterococcus started growing    Suspect toe infection was source of bacteremia, risk factor wet shoes. Appreciate podiatry assistance with source control. Quite possible foot infection polymicrobial. Discussed with podiatry and suspect he has residual infection in foot so will need 6 week course of abx     Recommendations:   - continue zosyn. Plan for 6 week course (est end date 7/31)  - please notify ID of any new growth on cultures    Outpatient Antibiotic Therapy Plan:    Please send referral to [facility].    1) Infection: osteomyelitis , bacteremia    2) Discharge Antibiotics:    Intravenous antibiotics:  Zosyn 13.5 gm continuous infusion     3) Therapy Duration:  6 weeks    Estimated end date of IV antibiotics: 7/31    4) Outpatient Weekly Labs:    Order the following labs to be drawn on Mondays:   CBC  CMP   CRP      5) Fax Lab Results to Infectious Diseases Provider:     [facility] Fax Number: [phone]    6) Outpatient Infectious Diseases Follow-up    Follow-up appointment will be arranged by the ID clinic and will be found in the patient's appointments tab.    Prior to discharge, please ensure the patient's follow-up has been scheduled.    If there is still no follow-up scheduled prior to discharge, please send an EPIC message to [facility] Clinical Pool or Call Infectious Diseases Dept.            I have reviewed hospital notes from  HTS service and other specialty providers. I have also reviewed CBC, CMP/BMP,  cultures and imaging with my interpretation as documented.     "

## 2025-06-26 NOTE — PROGRESS NOTES
[facility] - Cardiology Stepdown  Heart Transplant  Progress Note    Patient Name: Rocco France  MRN: [id number]  Admission Date: 6/20/2025  Hospital Length of Stay: 6 days  Attending Physician: Mike Chauhan MD  Primary Care Provider: Jay Guardado DO  Principal Problem:Type 2 diabetes mellitus    Subjective:   Interval History: No events overnight. Cultures have procesed w/ E coli and enterococcus faecalis. Needs 6 week course of IV zosyn for the enterococcus bacteremia.    Continuous Infusions:  Scheduled Meds:   amLODIPine  10 mg Oral Daily    atorvastatin  80 mg Oral Daily    insulin aspart U-100  7 Units Subcutaneous TIDWM    insulin glargine U-100  24 Units Subcutaneous Daily    magnesium citrate  296 mL Oral Once    mirtazapine  30 mg Oral QHS    mupirocin   Nasal BID    piperacillin-tazobactam (Zosyn) IV (PEDS and ADULTS) (extended infusion is not appropriate)  4.5 g Intravenous Q8H    polyethylene glycol  17 g Oral Daily    spironolactone  25 mg Oral Daily    warfarin  2.5 mg Oral Every Tues, Thurs, Sat, Sun    warfarin  5 mg Oral Every Mon, Wed, Fri     PRN Meds:  Current Facility-Administered Medications:     acetaminophen, 650 mg, Oral, Q6H PRN    bisacodyL, 10 mg, Rectal, Daily PRN    chlorproMAZINE, 25 mg, Oral, TID PRN    dextrose 50%, 12.5 g, Intravenous, PRN    dextrose 50%, 25 g, Intravenous, PRN    fentaNYL, 25 mcg, Intravenous, Q5 Min PRN    glucagon (human recombinant), 1 mg, Intramuscular, PRN    glucose, 16 g, Oral, PRN    glucose, 24 g, Oral, PRN    insulin aspart U-100, 0-5 Units, Subcutaneous, QID (AC + HS) PRN    ondansetron, 4 mg, Intravenous, Daily PRN    Review of patient's allergies indicates:   Allergen Reactions    Pcn [penicillins] Hives     Objective:     Vital Signs (Most Recent):  Temp: 98.1 °F (36.7 °C) (06/26/25 1145)  Pulse: 79 (06/26/25 1145)  Resp: 18 (06/26/25 1145)  BP: 93/66 (06/26/25 1145)  SpO2: 98 % (06/26/25 1145) Vital Signs (24h Range):  Temp:  [97.8 °F (36.6  °C)-98.5 °F (36.9 °C)] 98.1 °F (36.7 °C)  Pulse:  [] 79  Resp:  [18] 18  SpO2:  [98 %-99 %] 98 %  BP: ()/(0-83) 93/66     Patient Vitals for the past 72 hrs (Last 3 readings):   Weight   06/26/25 0615 72.8 kg (160 lb 7.9 oz)   06/25/25 0755 73.5 kg (162 lb 0.6 oz)   06/25/25 0645 74.4 kg (164 lb 0.4 oz)     Body mass index is 20.61 kg/m².      Intake/Output Summary (Last 24 hours) at 6/26/2025 1206  Last data filed at 6/26/2025 0120  Gross per 24 hour   Intake 444 ml   Output 975 ml   Net -531 ml       Hemodynamic Parameters:       Telemetry: reviewed       Physical Exam  Vitals reviewed.   Constitutional:       Appearance: He is normal weight.   HENT:      Head: Normocephalic and atraumatic.      Right Ear: External ear normal.      Left Ear: External ear normal.      Nose: Nose normal.      Mouth/Throat:      Mouth: Mucous membranes are moist.      Pharynx: Oropharynx is clear.   Eyes:      Conjunctiva/sclera: Conjunctivae normal.   Cardiovascular:      Rate and Rhythm: Normal rate and regular rhythm.      Pulses: Normal pulses.   Pulmonary:      Effort: Pulmonary effort is normal.      Breath sounds: Normal breath sounds.   Abdominal:      General: Abdomen is flat. Bowel sounds are normal.      Palpations: Abdomen is soft.   Musculoskeletal:      Cervical back: Normal range of motion.      Right lower leg: No edema.      Left lower leg: No edema.   Skin:     General: Skin is warm.      Capillary Refill: Capillary refill takes less than 2 seconds.   Neurological:      Mental Status: He is alert. Mental status is at baseline.   Psychiatric:         Mood and Affect: Mood normal.         Behavior: Behavior normal.            Significant Labs:  CBC:  Recent Labs   Lab 06/24/25  0258 06/25/25  0246 06/26/25  0324   WBC 13.49* 12.91* 11.12   RBC 3.27* 3.12* 3.24*   HGB 9.4* 9.0* 9.2*   HCT 29.6* 28.3* 29.3*    235 238   MCV 91 91 90   MCH 28.7 28.8 28.4   MCHC 31.8* 31.8* 31.4*     BNP:  Recent Labs    Lab 06/20/25  0959 06/23/25  0646 06/25/25  0246   * 270* 424*     CMP:  Recent Labs   Lab 06/20/25  0959 06/20/25  1404 06/23/25  0646 06/23/25  0851 06/24/25  0258 06/25/25  0246 06/26/25  0324   GLU  --    < >  --    < > 143* 125* 130*   CALCIUM  --    < >  --    < > 8.8 8.5* 8.3*   ALBUMIN 2.6*  --  2.2*  --   --  2.0*  --    PROT 9.5*  --  9.0*  --   --  8.5*  --    NA  --    < >  --    < > 133* 133* 135*   K  --    < >  --    < > 4.2 4.2 4.1   CO2  --    < >  --    < > 24 24 24   CL  --    < >  --    < > 100 100 104   BUN  --    < >  --    < > 27* 22 19   CREATININE  --    < >  --    < > 1.3 1.2 1.2   ALKPHOS 216*  --  207*  --   --  257*  --    ALT 19  --  15  --   --  21  --    AST 42  --  28  --   --  47*  --    BILITOT 2.7*  --  1.1*  --   --  0.9  --     < > = values in this interval not displayed.      Coagulation:   Recent Labs   Lab 06/24/25  0258 06/25/25  0246 06/26/25  0324   INR 1.6* 1.6* 1.5*   APTT 34.6* 35.6* 33.9*     LDH:  Recent Labs   Lab 06/24/25  0258 06/25/25  0246 06/26/25  0324    211 217     Microbiology:  Microbiology Results (last 7 days)       Procedure Component Value Units Date/Time    Culture, Anaerobic [[id number]] Collected: 06/22/25 1450    Order Status: Completed Specimen: Bone from Toe, Right Foot Updated: 06/26/25 1056     Anaerobe Culture No Anaerobes Isolated    Blood culture [[id number]]  (Normal) Collected: 06/23/25 0646    Order Status: Completed Specimen: Blood from Peripheral, Antecubital, Right Updated: 06/26/25 0901     Blood Culture No Growth After 72 Hours    Aerobic culture [[id number]]  (Abnormal)  (Susceptibility) Collected: 06/22/25 1450    Order Status: Completed Specimen: Bone from Toe, Right Foot Updated: 06/25/25 1235     CULTURE, AEROBIC Few Enterococcus faecalis    Afb Culture Stain [[id number]] Collected: 06/22/25 1450    Order Status: Completed Specimen: Bone from Toe, Right Foot Updated: 06/23/25 1518     ACID FAST STAIN  No acid fast  bacilli seen    Fungus culture [[id number]]  (Normal) Collected: 06/22/25 1450    Order Status: Completed Specimen: Bone from Toe, Right Foot Updated: 06/23/25 1009     Fungal Culture Culture In Progress    Gram stain [[id number]] Collected: 06/22/25 1450    Order Status: Completed Specimen: Bone from Toe, Right Foot Updated: 06/22/25 1857     GRAM STAIN No WBCs, epithelial cells or organisms seen    AFB Culture & Smear [[id number]] Collected: 06/22/25 1450    Order Status: Resulted Specimen: Bone from Toe, Right Foot Updated: 06/22/25 1640    Culture, Anaerobe [[id number]]     Order Status: Sent Specimen: Skin     Aerobic culture [[id number]]     Order Status: Sent Specimen: Skin             I have reviewed all pertinent labs within the past 24 hours.    Estimated Creatinine Clearance: 59.8 mL/min (based on SCr of 1.2 mg/dL).    Diagnostic Results:  I have reviewed and interpreted all pertinent imaging results/findings within the past 24 hours.  Assessment and Plan:     No notes on file    * Type 2 diabetes mellitus  Pt presenting with hyperglycemia of nearly 600. Pt dry on exam on AM eval, overnight initiated on diuresis with IVP, discontinued this morning.   D/w Endocrine, given downtrending glucose levels, will initiate scheduled insulin.   -po intake per SLP recommendations - diabetic diet ordered   -endocrine following, appreciate recs.     Acute osteomyelitis of toe, right  Likely source for septic shock on presentation.  -podiatry seeing pt, plan for s/p amputation 6/22.   -Continue IV zosyn, Needs 6 week course of IV zosyn for the associtated enterococcus bacteremia.  -cont holding diuretics     Bacteremia  -cont Zosyn for enterococcus/E coli bacteremia  -ID following, appreciate recs  -R toe osteomyelitis is most likely source   -Needs 6 week course of IV zosyn for the enterococcus bacteremia, concerns over the safety of sending home with PICC line and IV abx discussing if any PO alternatives    Shock  Pt  initially presented with elevated lactic and elevated glucose, elevated serum osm but not quite to level of HHS. DKA workup negative. No pH for eval completed. Septic shock with source most likely R foot.     -endocrine following, appreciate recs  -stop diuresis, cont to hold   -monitor renal function   -podiatry c/s, appreciate recs, OR 6/22 for partial amputation       Anticoagulated  -cont warfarin per INR   -pharmacy assisting with dosing     LVAD (left ventricular assist device) present  Procedure: Device Interrogation Including analysis of device parameters  Current Settings: Ventricular Assist Device  INR 1.5, will continue coumadin  INR therapeutic, Continue coumadin  Review of device function is stable/unstable stable        6/26/2025    11:56 AM 6/26/2025     7:42 AM 6/26/2025     3:39 AM 6/25/2025    11:47 PM 6/25/2025     7:30 PM 6/25/2025     4:00 PM 6/25/2025    12:00 PM   TXP LVAD INTERROGATIONS   Type HeartMate3 HeartMate3 HeartMate3 HeartMate3 HeartMate3 HeartMate3 HeartMate3   Flow 4.4 4.5 4.4 4.3 4.3 4.5 4.6   Speed 5200 5200 5200 5200 5250 5200 5200   PI 6.1 6.2 6.4 6.3 6.9 5.8 6   Power (Edwards) 3.6 3.6 3.6 3.5 3.6 3.6 3.6   LSL 4800 4800 4800 4800 4850 4800 4800   Low Flow Alarm no no no no no no no   Pulsatility Pulse Pulse Pulse Pulse Pulse Pulse Pulse             Yohan Nunn PA-C  Heart Transplant  [facility] - Cardiology Stepdown

## 2025-06-26 NOTE — PT/OT/SLP PROGRESS
Physical Therapy      Patient Name:  Rocco France   MRN:  [id number]    PT attempted to see pt for treatment at 1000 and 1050. PT educated patient on plan for session and importance of participation in therapy for physiological benefits and progression toward goals. Patient politely declined participating in treatment at this time 2/2 needing time to make phone calls to family at first attempt and requesting afternoon treatment at second attempt. Writing therapist unable to return this date; patient was educated that different therapist may re-attempt visit later this date if schedule allows. Patient verbally acknowledged understanding. Will follow up as appropriate.     6/26/2025

## 2025-06-26 NOTE — PROGRESS NOTES
"Update/Discharge planning    Pt presents in room aaox4 with open affect. Caregivers not present at time of visit. Pt voices understanding of plan of care. Pt states his electricity was not shut off, but his gas is off until dgtr makes payment and it gets reconnected. Pt state he does not use oven or stove often. Pt states he eats cereal for breakfast, and gets daily Meals on Wheels delivery for lunch. Pt states he snacks later in the day.    Pt states he will be able to have a ride tomorrow. Pt states his nephew, or a friend, should be able to drive him home. Pt aware that plan is to be on IV abx at home. ID recommending continuous IV Zosyn.     JULIO C sent referral to [facility] ([facility] unable to accept due to pt has a Medicare plan).     With pt's permission, JULIO C phoned pt's spouse, Meme, who lives separately from pt. Meme voices concern pt is "pushing" to be discharged. Assured Meme he is not. Updated Meme on change of plan to IV abx. Meme is a post-transplant pt and states that she has to take care of herself too.    JULIO C phoned pt's home health nurse, Teresa, who expressed strong concerns about pt's ability to manage IV abx at home alone. Teresa states the home health would not accept liability for pt's care if he goes home on IV abx and will be alone. Teresa states "he needs to go to a rehab." JULIO C explained pt's on IV abx cannot go to in patient rehab and that this is not the recommendation of PT. Teresa also expressed concern that pt is pushing to be discharged so he can return home and do drugs. JULIO C explained pt has not been pushing for discharge.    JULIO C informed medical team and abx will be revisited.    Pt reports coping well and denies further needs, questions, concerns at this time and none indicated. Providing ongoing psychosocial and counseling support, education, resources, assistance and discharge planning as indicated. Following and available.    Addendum:     JULIO C asked [facility] to come and start teaching pt. Per " "Kindra, pt did well. Education will continue tomorrow.    JULIO C received call from Teresa, home health nurse, stating she had called a facility in pt's area who said they can accept pt. Teresa provided contact Cheryl at [facility], ph [phone]. Without providing pt identifiers SW explained pt has an LVAD, explained what that is, and that pt will be on continuous IV antibiotic for 6 weeks. Cheryl states they cannot accept pt's on IV antibiotic.    JULIO C phoned [facility] and spoke with Trinidad, who is an . Trinidad states they will not be able to staff pt due to he will have a picc line.    JULIO C asked Lacy at [facility] to see if they have a HH that can accept pt.     SW to pt's room due to pt has more questions. Pt presents with distress regarding getting misinformation from home health RN regarding going to a facility and concern that he will have to handle the IV antibiotic without help. JULIO C explained that pt's new HH company's RN will be coming every 3 days to do wound care on his foot, and that he will be able to call [employer] anytime he needs help. Pt states he is worried he will forget to change the ball, and asks what if it needs changed at 3 in the morning. JULIO C explained that pt will be connected when he discharges from here, which will be day time and that the ball will need changed 24 hours after that. Pt verbalizes understanding.    Pt states he wants to "sleep on it" and will talk to SW more tomorrow.    Lacy from [facility] reports that [facility] will accept pt and Lacy will phone pt's spouse about being taught at home.    Will follow up with pt tomorrow.            "

## 2025-06-26 NOTE — PLAN OF CARE
Pt maintained free from falls/trauma/injuries and skin breakdown. Pt denied pain or discomfort. Plan of care reviewed. Pt verbalized understanding. All questions and concerns addressed. VSS. NAD.      Problem: Adult Inpatient Plan of Care  Goal: Plan of Care Review  Outcome: Progressing  Goal: Patient-Specific Goal (Individualized)  Outcome: Progressing  Goal: Absence of Hospital-Acquired Illness or Injury  Outcome: Progressing  Goal: Optimal Comfort and Wellbeing  Outcome: Progressing  Goal: Readiness for Transition of Care  Outcome: Progressing     Problem: Diabetes Comorbidity  Goal: Blood Glucose Level Within Targeted Range  Outcome: Progressing     Problem: Sepsis/Septic Shock  Goal: Optimal Coping  Outcome: Progressing  Goal: Absence of Bleeding  Outcome: Progressing  Goal: Blood Glucose Level Within Targeted Range  Outcome: Progressing  Goal: Absence of Infection Signs and Symptoms  Outcome: Progressing  Goal: Optimal Nutrition Intake  Outcome: Progressing     Problem: Ventricular Assist Device  Goal: Optimal Adjustment to Device  Outcome: Progressing  Goal: Absence of Bleeding  Outcome: Progressing  Goal: Absence of Embolism Signs and Symptoms  Outcome: Progressing  Goal: Optimal Blood Flow  Outcome: Progressing  Goal: Absence of Infection Signs and Symptoms  Outcome: Progressing  Goal: Effective Right-Sided Heart Function  Outcome: Progressing     Problem: Skin Injury Risk Increased  Goal: Skin Health and Integrity  Outcome: Progressing     Problem: Wound  Goal: Optimal Coping  Outcome: Progressing  Goal: Optimal Functional Ability  Outcome: Progressing  Goal: Absence of Infection Signs and Symptoms  Outcome: Progressing  Goal: Improved Oral Intake  Outcome: Progressing  Goal: Optimal Pain Control and Function  Outcome: Progressing  Goal: Skin Health and Integrity  Outcome: Progressing  Goal: Optimal Wound Healing  Outcome: Progressing     Problem: Fall Injury Risk  Goal: Absence of Fall and Fall-Related  Injury  Outcome: Progressing

## 2025-06-26 NOTE — ASSESSMENT & PLAN NOTE
Likely source for septic shock on presentation.  -podiatry seeing pt, plan for s/p amputation 6/22.   -Continue IV zosyn, Needs 6 week course of IV zosyn for the associtated enterococcus bacteremia.  -cont holding diuretics

## 2025-06-26 NOTE — CONSULTS
[facility] VASCULAR ACCESS NOTE       Bed:3098/3098 A    [facility] consulted for PICC insertion for long term abx therapy.    Per SHARMILA Nunn, hold off on PICC insertion for now d/t pt concerns about ability to facilitate at home abx administration.     Please re-consult [facility] if needed.      Eulalio Mo RN

## 2025-06-26 NOTE — PLAN OF CARE
[facility]   Heart Transplant/VAD Clinic   [address] [place] [address]   [phone] after hours          [phone] fax     VAD HOME  HEALTH ORDERS      Admit to Home Health    Diagnosis: Problem List[1]    Patient is homebound due to:  NYHA Class IV HF. S/P LVAD placement.     Diet: Low Fat, Low cholesterol, 2Gm Na, Coumadin restrictions.    Acitivities: No Swimming, bathing, vacuuming, contact sports.    Fresh implants= Sternal Precautions    Nursing:   SN to complete comprehensive assessment including routine vital signs. Instruct on disease process and s/s of complications to report to MD. Review/verify medication list sent home with the patient at time of discharge  and instruct patient/caregiver as needed. Frequency may be adjusted depending on start of care date.    **LVAD driveline exit site dressing change is to be completed per LVAD patient/caregiver only**.    Notify MD if:  SBP > 120 or < 80;   MAP > 90 or < 70;   HR > 120 or < 60;   Temp > 101;   Weight gain >3lbs in 1 day or 5lbs in 1 week.    LABS:  SN to perform labs: PT/INR per Coumadin clinic [phone].   Weekly bmp, mag, cbc.     Follow up INR date: Per coumadin clinic  No Finger Sticks    HOME INFUSION THERAPY:  SN to perform Infusion Therapy/Central Line Care.  Review Central Line Care & Central Line Flush with patient.    Administer (drug and dose): Zosyn 13.5 gm continuous infusion      3) Therapy Duration:  6 weeks (est end date 7/31)       4) Outpatient Weekly Labs:     Order the following labs to be drawn on Mondays:   CBC  CMP   CRP        5) Fax Lab Results to Infectious Diseases Provider:      [facility] Fax Number: [phone]    PICC/Central line care:  Scrub the Hub: Prior to accessing the line, always perform a 30 second alcohol scrub  Each lumen of the central line is to be flushed at least daily with 10 mL Normal Saline and 3 mL Heparin flush (100  units/mL)  Skilled Nurse (SN) may draw blood from IV access  Blood Draw Procedure:   - Aspirate at least 5 mL of blood   - Discard   - Obtain specimen   - Change posiflow cap   - Flush with 20 mL Normal Saline followed by a                 3-5 mL Heparin flush (100 units/mL)  :   - Sterile dressing changes are done weekly and as needed.   - Use chlor-hexadine scrub to cleanse site, apply Biopatch to insertion site,       apply securement device dressing   - Posi-flow caps are changed weekly and after EVERY lab draw.   - If sterile gauze is under dressing to control oozing,                 dressing change must be performed every 24 hours until gauze is not needed.    CONSULTS:      Physical Therapy to evaluate and treat. Evaluate for home safety and equipment needs; Establish/upgrade home exercise program. Perform / instruct on therapeutic exercises, gait training, transfer training, and Range of Motion.    Occupational Therapy to evaluate and treat. Evaluate home environment for safety and equipment needs. Perform/Instruct on transfers, ADL training, ROM, and therapeutic exercises.     to evaluate for community resources/long-range planning.     Wound care routine   Routine, Every Mon-Wed-Fri @ 1400, First occurrence on Mon 6/23/25 at 1400, Until Specified Cleansed foot with Vashe or sterile normal saline. Xeroform overlying right foot incision, gauze padding, secured with Kerlix, Ace. Wound 06/22/25 1428 Incision Right Toe, first       Send initial Home Health orders to [facility] attending physician on call.  Send follow up questions to VAD clinic MD [phone] or [phone].         [1]   Patient Active Problem List  Diagnosis    ICD (implantable cardioverter-defibrillator) in place    NICM (nonischemic cardiomyopathy)    Leg pain, left    Essential hypertension    Type 2 diabetes mellitus    Hyperlipidemia    Acute on chronic combined systolic and diastolic heart failure    CAD  (coronary artery disease)    Counseling regarding advanced care planning and goals of care    LVAD (left ventricular assist device) present    Anticoagulated    Bilateral subdural hematomas    PSVT (paroxysmal supraventricular tachycardia)    Chronic systolic congestive heart failure    Subarachnoid hemorrhage    Dizziness    Diplopia    Atrial fibrillation    Iron deficiency anemia due to chronic blood loss    Subdural hematoma, nontraumatic    Normocytic anemia    Muscle weakness of right upper extremity    Tobacco dependency    Shock    Moderate malnutrition    Bacteremia    Diabetic ulcer of right foot    Acute osteomyelitis of toe, right

## 2025-06-26 NOTE — PROGRESS NOTES
"[facility] - Cardiology Stepdown  Infectious Disease  Progress Note    Patient Name: Rocco France  MRN: [id number]  Admission Date: 6/20/2025  Length of Stay: 6 days  Attending Physician: Mike Chauhan MD  Primary Care Provider: Jay Guardado DO    Isolation Status: No active isolations  Assessment/Plan:      ID  Bacteremia  69M with h/o T2DM, HTN, CHF s/p HM3 implantation 2021 admitted 6/20 with generalized weakness, inability to "hold his urine" and hyperglycemia. Found to be febrile with toe wound. S/p ray amputation R toe  6/22, purulence noted. Bcx with e.coli (pan susceptible). Prox margin path/cultures pending (enterococcus ). Fevers resolved. Wbc trending down. changed Ceftriaxone --> zosyn when enterococcus started growing    Suspect toe infection was source of bacteremia, risk factor wet shoes. Appreciate podiatry assistance with source control. Quite possible foot infection polymicrobial. Discussed with podiatry and suspect he has residual infection in foot so will need 6 week course of abx     Recommendations:   - continue zosyn. Plan for 6 week course (est end date 7/31)  - please notify ID of any new growth on cultures    Outpatient Antibiotic Therapy Plan:    Please send referral to [facility].    1) Infection: osteomyelitis , bacteremia    2) Discharge Antibiotics:    Intravenous antibiotics:  Zosyn 13.5 gm continuous infusion     3) Therapy Duration:  6 weeks    Estimated end date of IV antibiotics: 7/31    4) Outpatient Weekly Labs:    Order the following labs to be drawn on Mondays:   CBC  CMP   CRP      5) Fax Lab Results to Infectious Diseases Provider:     [facility] Fax Number: [phone]    6) Outpatient Infectious Diseases Follow-up    Follow-up appointment will be arranged by the ID clinic and will be found in the patient's appointments tab.    Prior to discharge, please ensure the patient's follow-up has been scheduled.    If there is still no " follow-up scheduled prior to discharge, please send an Yurpy message to [facility] Clinical Pool or Call Infectious Diseases Dept.            I have reviewed hospital notes from  [facility] service and other specialty providers. I have also reviewed CBC, CMP/BMP,  cultures and imaging with my interpretation as documented.             Thank you for your consult.I will sign off. Please call back with questions/concerns    Yun Siegel MD  Infectious Disease  [facility] - Cardiology Stepdown    Subjective:     Principal Problem:Type 2 diabetes mellitus    HPI: A 69-year-old man with NICM, CAD, combined HF, Afib, s/p ICD, s/p LVAD on 1/13/21, DM2, subdural hematoma with subarachnoid hemorrhage in 2022 who presented to a local ED with generalized weakness. This was associated with three falls. EMS services found the patient to be hyperglycemic with blood sugar level noted to be over 500. On evaluation in the ED he was noted to be febrile to 101.1 F. This was associated with dysuria and hesitancy.  He was admitted to [facility] and started on cefepime plus vancomycin. Admission blood cultures on 6/19 are now positive for GNR identified as E coli on BCID.      Infection History:  October 4, 2021- DLI due to P aeruginosa, empiric Cipro plus doxycycline.  Interval History: naeo.tolerating zosyn without side effects.    Review of Systems   Constitutional:  Negative for chills, fatigue and fever.   HENT:  Negative for ear pain, mouth sores, nosebleeds, postnasal drip, rhinorrhea, sinus pressure, sore throat, tinnitus, trouble swallowing and voice change.    Eyes:  Negative for photophobia, pain, redness and visual disturbance.   Respiratory:  Negative for apnea, cough, chest tightness, shortness of breath and wheezing.    Cardiovascular:  Negative for chest pain, palpitations and leg swelling.   Gastrointestinal:  Negative for abdominal pain, blood in stool, constipation, diarrhea, nausea and vomiting.   Endocrine: Negative for cold intolerance,  heat intolerance, polydipsia and polyuria.   Genitourinary:  Negative for decreased urine volume, difficulty urinating, dysuria, flank pain, frequency, genital sores, hematuria, penile discharge, penile pain, penile swelling, scrotal swelling, testicular pain and urgency.   Musculoskeletal:  Negative for arthralgias, back pain, joint swelling, myalgias and neck pain.   Skin:  Positive for wound. Negative for color change and rash.   Allergic/Immunologic: Negative for environmental allergies and food allergies.   Neurological:  Negative for dizziness, seizures, syncope, weakness, light-headedness, numbness and headaches.   Hematological:  Negative for adenopathy. Does not bruise/bleed easily.   Psychiatric/Behavioral:  Negative for agitation, confusion, decreased concentration, hallucinations, self-injury, sleep disturbance and suicidal ideas. The patient is not nervous/anxious.      Objective:     Vital Signs (Most Recent):  Temp: 98.5 °F (36.9 °C) (06/26/25 0738)  Pulse: 77 (06/26/25 1000)  Resp: 18 (06/26/25 0737)  BP: 106/72 (06/26/25 0738)  SpO2: 98 % (06/26/25 0942) Vital Signs (24h Range):  Temp:  [97.8 °F (36.6 °C)-98.5 °F (36.9 °C)] 98.5 °F (36.9 °C)  Pulse:  [] 77  Resp:  [18] 18  SpO2:  [98 %-99 %] 98 %  BP: ()/(0-83) 106/72     Weight: 72.8 kg (160 lb 7.9 oz)  Body mass index is 20.61 kg/m².    Estimated Creatinine Clearance: 59.8 mL/min (based on SCr of 1.2 mg/dL).     Physical Exam  Vitals and nursing note reviewed. Exam conducted with a chaperone present.   Constitutional:       Appearance: Normal appearance.   HENT:      Head: Normocephalic and atraumatic.   Eyes:      General: No scleral icterus.        Right eye: No discharge.         Left eye: No discharge.      Conjunctiva/sclera: Conjunctivae normal.   Cardiovascular:      Rate and Rhythm: Normal rate and regular rhythm.      Pulses: Normal pulses.      Heart sounds: No murmur heard.     Comments: VAD Humming sounds  Pulmonary:       Effort: Pulmonary effort is normal. No respiratory distress.      Breath sounds: Normal breath sounds. No stridor. No wheezing or rhonchi.   Abdominal:      General: There is no distension.      Palpations: Abdomen is soft.      Tenderness: There is no abdominal tenderness.      Comments: DLES covered. No tenderness to palpation.   Musculoskeletal:      Comments: Right foot covered with compression bandage.   Skin:     General: Skin is warm and dry.   Neurological:      General: No focal deficit present.      Mental Status: He is alert and oriented to person, place, and time.          Significant Labs: CBC:   Recent Labs   Lab 06/25/25  0246 06/26/25  0324   WBC 12.91* 11.12   HGB 9.0* 9.2*   HCT 28.3* 29.3*    238     CMP:   Recent Labs   Lab 06/25/25 0246 06/26/25 0324   * 135*   K 4.2 4.1    104   CO2 24 24   * 130*   BUN 22 19   CREATININE 1.2 1.2   CALCIUM 8.5* 8.3*   PROT 8.5*  --    ALBUMIN 2.0*  --    BILITOT 0.9  --    ALKPHOS 257*  --    AST 47*  --    ALT 21  --    ANIONGAP 9 7*     Microbiology Results (last 7 days)       Procedure Component Value Units Date/Time    Culture, Anaerobic [[id number]] Collected: 06/22/25 1450    Order Status: Completed Specimen: Bone from Toe, Right Foot Updated: 06/26/25 1056     Anaerobe Culture No Anaerobes Isolated    Blood culture [[id number]]  (Normal) Collected: 06/23/25 0646    Order Status: Completed Specimen: Blood from Peripheral, Antecubital, Right Updated: 06/26/25 0901     Blood Culture No Growth After 72 Hours    Aerobic culture [[id number]]  (Abnormal)  (Susceptibility) Collected: 06/22/25 1450    Order Status: Completed Specimen: Bone from Toe, Right Foot Updated: 06/25/25 1235     CULTURE, AEROBIC Few Enterococcus faecalis    Afb Culture Stain [[id number]] Collected: 06/22/25 1450    Order Status: Completed Specimen: Bone from Toe, Right Foot Updated: 06/23/25 1518     ACID FAST STAIN  No acid fast bacilli seen    Fungus culture  [[id number]]  (Normal) Collected: 06/22/25 1450    Order Status: Completed Specimen: Bone from Toe, Right Foot Updated: 06/23/25 1009     Fungal Culture Culture In Progress    Gram stain [[id number]] Collected: 06/22/25 1450    Order Status: Completed Specimen: Bone from Toe, Right Foot Updated: 06/22/25 1857     GRAM STAIN No WBCs, epithelial cells or organisms seen    AFB Culture & Smear [[id number]] Collected: 06/22/25 1450    Order Status: Resulted Specimen: Bone from Toe, Right Foot Updated: 06/22/25 1640    Culture, Anaerobe [[id number]]     Order Status: Sent Specimen: Skin     Aerobic culture [[id number]]     Order Status: Sent Specimen: Skin             Significant Imaging: I have reviewed all pertinent imaging results/findings within the past 24 hours.

## 2025-06-26 NOTE — PROGRESS NOTES
06/26/2025  Pamella Au    Current provider:  Mike Chauhan MD    Device interrogation:      6/26/2025     7:42 AM 6/26/2025     3:39 AM 6/25/2025    11:47 PM 6/25/2025     7:30 PM 6/25/2025     4:00 PM 6/25/2025    12:00 PM 6/25/2025     7:57 AM   TXP LVAD INTERROGATIONS   Type HeartMate3 HeartMate3 HeartMate3 HeartMate3 HeartMate3 HeartMate3 HeartMate3   Flow 4.5 4.4 4.3 4.3 4.5 4.6 4.5   Speed 5200 5200 5200 5250 5200 5200 5200   PI 6.2 6.4 6.3 6.9 5.8 6 6.2   Power (Edwards) 3.6 3.6 3.5 3.6 3.6 3.6 3.7   LSL 4800 4800 4800 4850 4800 4800 4800   Low Flow Alarm no no no no no no no   Pulsatility Pulse Pulse Pulse Pulse Pulse Pulse Pulse          Rounded on Rocco France to ensure all mechanical assist device settings (IABP or VAD) were appropriate and all parameters were within limits.  I was able to ensure all back up equipment was present, the staff had no issues, and the Perfusion Department daily rounding was complete.      For implantable VADs: Interrogation of Ventricular assist device was performed with analysis of device parameters and review of device function. I have personally reviewed the interrogation findings and agree with findings as stated.     In emergency, the nursing units have been notified to contact the perfusion department either by:  Calling [phone] from 630am to 4pm Mon thru Fri, utilizing the On-Call Finder functionality of Epic and searching for Perfusion, or by contacting the hospital  from 4pm to 630am and on weekends and asking to speak with the perfusionist on call.    11:37 AM

## 2025-06-26 NOTE — SUBJECTIVE & OBJECTIVE
"Interval HPI:   No acute events overnight. Patient in room 3098/3098 A. Blood glucose stable. BG at goal on current insulin regimen (SSI, prandial, and basal insulin ). Steroid use- None .   4 Days Post-Op  Renal function- Normal   Vasopressors-  None     Diet Consistent Carbohydrate 2000 Calories (up to 75 gm per meal)     Eatin%  Nausea: No  Hypoglycemia and intervention: No  Fever: No  TPN and/or TF: No    /72 (BP Location: Left arm, Patient Position: Lying)   Pulse 81   Temp 98.5 °F (36.9 °C)   Resp 18   Ht 6' 2" (1.88 m)   Wt 72.8 kg (160 lb 7.9 oz)   SpO2 98%   BMI 20.61 kg/m²     Labs Reviewed and Include    Recent Labs   Lab 25  0324   *   CALCIUM 8.3*   *   K 4.1   CO2 24      BUN 19   CREATININE 1.2     Lab Results   Component Value Date    WBC 11.12 2025    HGB 9.2 (L) 2025    HCT 29.3 (L) 2025    MCV 90 2025     2025     No results for input(s): "TSH", "FREET4" in the last 168 hours.  Lab Results   Component Value Date    HGBA1C 12.4 (H) 2025       Nutritional status:   Body mass index is 20.61 kg/m².  Lab Results   Component Value Date    ALBUMIN 2.0 (L) 2025    ALBUMIN 2.2 (L) 2025    ALBUMIN 2.6 (L) 2025     Lab Results   Component Value Date    PREALBUMIN 5 (L) 2025    PREALBUMIN 5 (L) 2025    PREALBUMIN 5 (L) 2025       Estimated Creatinine Clearance: 59.8 mL/min (based on SCr of 1.2 mg/dL).    Accu-Checks  Recent Labs     25  1145 25  1604 25  2037 25  0634 25  0706 25  1153 25  1605 25  2047 25  0632 25  0757   POCTGLUCOSE 133* 92 161* 159* 142* 149* 90 225* 137* 156*       Current Medications and/or Treatments Impacting Glycemic Control  Immunotherapy:    Immunosuppressants       None          Steroids:   Hormones (From admission, onward)      None          Pressors:    Autonomic Drugs (From admission, onward)      " None          Hyperglycemia/Diabetes Medications:   Antihyperglycemics (From admission, onward)      Start     Stop Route Frequency Ordered    06/26/25 0715  insulin aspart U-100 pen 7 Units         -- SubQ 3 times daily with meals 06/25/25 1616    06/22/25 0900  insulin glargine U-100 (Lantus) pen 24 Units         -- SubQ Daily 06/22/25 0736    06/20/25 1449  insulin aspart U-100 pen 0-5 Units         -- SubQ Before meals & nightly PRN 06/20/25 1349

## 2025-06-26 NOTE — PLAN OF CARE
LVAD number and doppler WNL. LVAD dressing CDI. Pt educated on fall risk and remained free from falls/trauma/injury. Denies chest pain, SOB, palpitations, dizziness, pain, or discomfort. Plan of care reviewed with pt, all questions answered. Bed locked in lowest position, call bell within reach, no acute distress noted, will continue to monitor.

## 2025-06-26 NOTE — PROGRESS NOTES
[facility] - Cardiology Stepdown  Adult Nutrition  Progress Note    SUMMARY       Recommendations    Recommendation/Intervention:   1. Continue diabetic diet as tolerated     - please continue to document PO % intake via flowsheets     2. Recommend boost glucose control chocolate TID   3. Encourage good intake      4. RD to monitor weight, labs, intake, tolerance  5. Recommend MVI    Goals:   1. % nutritional needs met with diet during admission     2. Maintain weight during admission  3. Display s/s of wound healing during admission   Nutrition Goal Status: progressing towards goal  Communication of RD Recs: other (comment) (POC)    Nutrition Discharge Planning    Nutrition Discharge Planning: Therapeutic diet (comments), Oral supplement regimen (comments)  Therapeutic diet (comments): cardiac diabetic diet  Oral supplement regimen (comments): oral nutrition supplement of choice and MVI    Assessment and Plan    Endocrine  Moderate malnutrition  Malnutrition Type:  Context: chronic illness  Level: moderate    Related to (etiology):   Physiological causes resulting in anorexia or diminished intake     Signs and Symptoms (as evidenced by):   Muscle and fat depletion      Malnutrition Characteristic Summary:  Subcutaneous Fat (Malnutrition): moderate depletion  Muscle Mass (Malnutrition): moderate depletion    Interventions/Recommendations (treatment strategy):  Collaboration with other providers  ONS    Nutrition Diagnosis Status:   New         Malnutrition Assessment    Malnutrition Context: chronic illness  Malnutrition Level: moderate          Subcutaneous Fat (Malnutrition): moderate depletion  Muscle Mass (Malnutrition): moderate depletion   Orbital Region (Subcutaneous Fat Loss): moderate depletion  Upper Arm Region (Subcutaneous Fat Loss): moderate depletion  Thoracic and Lumbar Region: moderate depletion   Grand Ledge Region (Muscle Loss): moderate depletion  Clavicle Bone Region (Muscle Loss): moderate  "depletion  Clavicle and Acromion Bone Region (Muscle Loss): moderate depletion  Dorsal Hand (Muscle Loss): moderate depletion  Patellar Region (Muscle Loss): moderate depletion  Anterior Thigh Region (Muscle Loss): moderate depletion  Posterior Calf Region (Muscle Loss): moderate depletion     Reason for Assessment    Reason For Assessment: RD follow-up  Diagnosis: diabetes diagnosis/complications (Type 2 diabetes mellitus)  General Information Comments: Pt admitted with type 2 diabetes mellitus. New surgical hx: toe amputation 6/22. LVAD present. No edema noted. New wound: incision on foot. Having nausea, no other GI s/s - BM: 6/24. Having a fair to good intake - PO: 50-75%. Malnutrition continues. Wt stable during admission.    Nutrition/Diet History    Nutrition Intake History: 1 meal and snack  Spiritual, Cultural Beliefs, Buddhism Practices, Values that Affect Care: no  Food Allergies: NKFA  Factors Affecting Nutritional Intake: decreased appetite  Nutrition-related SDOH: None Identified    Anthropometrics    Height: 6' 2" (188 cm)  Height (inches): 74 in  Weight: 72.8 kg (160 lb 7.9 oz)  Weight (lb): 160.5 lb  Weight Method: Standard Scale  Ideal Body Weight (IBW), Male: 190 lb  % Ideal Body Weight, Male (lb): 86.21 %  BMI (Calculated): 20.6  BMI Grade: less than 23 (older than 65 years) - underweight  Usual Body Weight (UBW), kg:  (pt unsure)    Lab/Procedures/Meds    Pertinent Labs Reviewed: reviewed  Pertinent Labs Comments: H/H 9.2/29.3 low, Na 135 low, glucose 130 high, Ca 8.3 low, albumin 2.0 low, AST 47, .5 high  Pertinent Medications Reviewed: reviewed  Pertinent Medications Comments: amlodipine, atorvastatin, insulin aspart, inulin lantus, mirtazapine, piperacillin, spironolactone, warfarin    Estimated/Assessed Needs    Weight Used For Calorie Calculations: 72.8 kg (160 lb 7.9 oz)  Energy Calorie Requirements (kcal): 1954 kcal  Energy Need Method: Emmons-St Jeor (* 1.25)  Protein " Requirements:  g/pro (1.2-2.0 g/kg)  Weight Used For Protein Calculations: 72.8 kg (160 lb 7.9 oz)        RDA Method (mL): 1954  CHO Requirement: 244 g    Nutrition Prescription Ordered    Current Diet Order: diabetic    Evaluation of Received Nutrient/Fluid Intake    I/O: 934/1275 on 6/25  Energy Calories Required: not meeting needs  Protein Required: not meeting needs  Fluid Required: other (see comments) (POC)  Tolerance: tolerating  % Intake of Estimated Energy Needs: 50 - 75 %  % Meal Intake: 50 - 75 %    Nutrition Risk    Level of Risk/Frequency of Follow-up: moderate (1-2/week)     Monitor and Evaluation    Monitor and Evaluation: Energy intake, Food and beverage intake, Protein intake, Carbohydrate intake, Diet order, Weight, Electrolyte and renal panel, Gastrointestinal profile, Glucose/endocrine profile, Inflammatory profile, Lipid profile, Nutrition focused physical findings, Skin (NFPE completed 6/20)     Nutrition Follow-Up    RD Follow-up?: Yes

## 2025-06-26 NOTE — PLAN OF CARE
PVSS. BG monitored. LVAD number and doppler WNL. LVAD dressing CDI. Rt toe dressing changed per order. Pt educated on fall risk and remained free from falls/trauma/injury. Denies chest pain, SOB, palpitations, dizziness, pain, or discomfort. Plan of care reviewed with pt, all questions answered. Bed locked in lowest position, call bell within reach, no acute distress noted, will continue to monitor.     Triage Chief Complaint:   Illness (Pt here by EMS from [address]. EMS says he stated he was \"feeling like shit all day. \"  Now he states he doesn't feel bad and that his fiance made him come.)    [place]:  Ghazala Nowak is a 28 y.o. male that presents with with increased lethargy over the last day and a temp to 101 at his care facility, [facility]. Patient states he has been coughing but denies any shortness of breath. He states he has had some nausea and vomiting but has a history of acid reflux. He denies any new wounds but does have multiple chronic wounds on his buttocks that had packing and then. Per previous documentation he has some osteomyelitis from these 2. He denies any abdominal pain. He has ESRD and is on dialysis, his last dialysis was on Friday. ROS:   Review of Systems   Constitutional: Positive for fatigue and fever. Negative for chills. HENT: Negative for congestion, rhinorrhea and sore throat. Eyes: Negative for redness and visual disturbance. Respiratory: Positive for cough. Negative for shortness of breath. Cardiovascular: Negative for chest pain and leg swelling. Gastrointestinal: Negative for abdominal pain, nausea and vomiting. Genitourinary: Negative for dysuria and frequency. Musculoskeletal: Negative for arthralgias and back pain. Skin: Positive for wound (chronic sacral wounds). Negative for rash. Neurological: Negative for syncope, weakness, light-headedness, numbness and headaches. Psychiatric/Behavioral: Negative for hallucinations and suicidal ideas. Past Medical History:   Diagnosis Date    Diabetes mellitus type 1 ([facility]     Diabetic amyotrophia ([facility]     End stage kidney disease ([facility]     Skin breakdown      History reviewed. No pertinent surgical history. History reviewed. No pertinent family history.   Social History     Socioeconomic History    Marital status: Single     Spouse name: Not on file    Number of children: Not on file    Years of education: Not on file    Highest education level: Not on file   Occupational History    Not on file   Tobacco Use    Smoking status: Never Smoker   Vaping Use    Vaping Use: Never used   Substance and Sexual Activity    Alcohol use: Not Currently    Drug use: Not Currently     Types: Marijuana    Sexual activity: Not Currently   Other Topics Concern    Not on file   Social History Narrative    Not on file     Social Determinants of Health     Financial Resource Strain:     Difficulty of Paying Living Expenses:    Food Insecurity:     Worried About Running Out of Food in the Last Year:     Ran Out of Food in the Last Year:    Transportation Needs:     Lack of Transportation (Medical):      Lack of Transportation (Non-Medical):    Physical Activity:     Days of Exercise per Week:     Minutes of Exercise per Session:    Stress:     Feeling of Stress :    Social Connections:     Frequency of Communication with Friends and Family:     Frequency of Social Gatherings with Friends and Family:     Attends [employer] Services:     Active Member of Clubs or Organizations:     Attends Club or Organization Meetings:     Marital Status:    Intimate Partner Violence:     Fear of Current or Ex-Partner:     Emotionally Abused:     Physically Abused:     Sexually Abused:      Current Facility-Administered Medications   Medication Dose Route Frequency Provider Last Rate Last Admin    0.9 % sodium chloride infusion   Intravenous PRN Justine Mann MD        vancomycin (VANCOCIN) 1500 mg in 300 mL IVPB  1,500 mg Intravenous Once Justine Mann  mL/hr at 08/02/21 0127 1,500 mg at 08/02/21 0127     Current Outpatient Medications   Medication Sig Dispense Refill    Cholecalciferol 1.25 MG (16524 UT) TABS Take 1 tablet by mouth daily      cloNIDine (CATAPRES) 0.1 MG tablet Take 0.1 mg by mouth every 4 hours as needed for High Blood Pressure      dicyclomine (BENTYL) 20 MG tablet Take 20 mg by mouth every 6 hours      apixaban (ELIQUIS) 2.5 MG TABS tablet Take 2.5 mg by mouth 2 times daily      escitalopram (LEXAPRO) 10 MG tablet Take 10 mg by mouth daily      tamsulosin (FLOMAX) 0.4 MG capsule Take 0.4 mg by mouth daily      folic acid (FOLVITE) 1 MG tablet Take 1 mg by mouth daily      furosemide (LASIX) 80 MG tablet Take 80 mg by mouth daily      gabapentin (NEURONTIN) 300 MG capsule Take 300 mg by mouth 3 times daily.  hydrALAZINE (APRESOLINE) 25 MG tablet Take 25 mg by mouth daily      HYDROcodone-acetaminophen (NORCO) 5-325 MG per tablet Take 1 tablet by mouth every 6 hours as needed for Pain.  insulin lispro (HUMALOG) 100 UNIT/ML injection vial Inject into the skin 4 times daily (with meals and nightly) Sliding Scale: If BG 0-150 = 0 units  If 151-200 = 2 units  If 201-250 = 4 units  If 251-300 = 6 units  If 301-350 = 8 units  If 351-400 = 10 units      lactase (LACTAID) 3000 units tablet Take 1 tablet by mouth 3 times daily (with meals)      insulin glargine (LANTUS) 100 UNIT/ML injection vial Inject 5 Units into the skin nightly      pregabalin (LYRICA) 75 MG capsule Take 75 mg by mouth 2 times daily.       melatonin 3 MG TABS tablet Take 3 mg by mouth nightly      mirtazapine (REMERON SOL-TAB) 15 MG disintegrating tablet Take 15 mg by mouth nightly      nystatin (MYCOSTATIN) POWD powder Apply topically 2 times daily Apply to groin and scrotum topically every morning and at bedtime for skin irritation      promethazine (PHENERGAN) 12.5 MG tablet Take 12.5 mg by mouth every 6 hours as needed for Nausea      Multiple Vitamins-Minerals (PRORENAL + D) TABS Take 1 tablet by mouth daily      sevelamer (RENVELA) 800 MG tablet Take 1 tablet by mouth 3 times daily (with meals)      simethicone (MYLICON) 80 MG chewable tablet Take 80 mg by mouth every 6 hours as needed for Flatulence       Allergies   Allergen Reactions    Oxycodone      Violent    Rondec-D [Chlophedianol-Pseudoephedrine] \"spacey\"       Nursing Notes Reviewed     Physical Exam:   ED Triage Vitals   Enc Vitals Group      BP       Pulse       Resp       Temp       Temp src       SpO2       Weight       Height       Head Circumference       Peak Flow       Pain Score       Pain Loc       Pain Edu? Excl. in [facility]? BP (!) 140/75   Pulse 82   Temp 102.9 °F (39.4 °C) (Oral)   Resp 14   Ht 6' 2\" (1.88 m)   Wt 180 lb (81.6 kg)   SpO2 98%   BMI 23.11 kg/m²   My pulse ox interpretation is - normal  Physical Exam  Vitals and nursing note reviewed. Exam conducted with a chaperone present (Nurse Mckenna Sanz). Constitutional:       General: He is not in acute distress. Appearance: He is well-developed. He is ill-appearing. He is not diaphoretic. Comments: Chronically ill appearing     HENT:      Head: Normocephalic and atraumatic. Eyes:      General:         Right eye: No discharge. Left eye: No discharge. Conjunctiva/sclera: Conjunctivae normal.      Comments: Clouding of left cornea     Cardiovascular:      Rate and Rhythm: Normal rate and regular rhythm. Pulmonary:      Effort: Pulmonary effort is normal. No respiratory distress. Breath sounds: Normal breath sounds. No wheezing or rhonchi. Abdominal:      General: There is no distension. Palpations: Abdomen is soft. Tenderness: There is no abdominal tenderness. There is no guarding or rebound. Musculoskeletal:         General: No swelling or signs of injury. Normal range of motion. Comments: Severe muscle wasting   Skin:     General: Skin is warm and dry. Neurological:      General: No focal deficit present. Mental Status: He is alert. Cranial Nerves: No cranial nerve deficit.    Psychiatric:         Mood and Affect: Mood normal.         Behavior: Behavior normal.         I have reviewed and interpreted all of the currently available lab results from this visit (if applicable):  Results for orders placed or performed during the hospital encounter of 08/01/21   COVID-19, Rapid    Specimen: Nasopharyngeal   Result Value Ref Range    Source THROAT     SARS-CoV-2, NAAT NOT DETECTED NOT DETECTED   CBC Auto Differential   Result Value Ref Range    WBC 19.4 (H) 4.0 - 10.5 K/CU MM    RBC 2.53 (L) 4.6 - 6.2 M/CU MM    Hemoglobin 6.4 (LL) 13.5 - 18.0 GM/DL    Hematocrit 23.3 (L) 42 - 52 %    MCV 92.1 78 - 100 FL    MCH 25.3 (L) 27 - 31 PG    MCHC 27.5 (L) 32.0 - 36.0 %    RDW 14.7 11.7 - 14.9 %    Platelets 491 (H) 061 - 440 K/CU MM    MPV 9.0 7.5 - 11.1 FL    Differential Type AUTOMATED DIFFERENTIAL     Segs Relative 84.6 (H) 36 - 66 %    Lymphocytes % 7.8 (L) 24 - 44 %    Monocytes % 6.0 (H) 0 - 4 %    Eosinophils % 0.7 0 - 3 %    Basophils % 0.3 0 - 1 %    Segs Absolute 16.4 K/CU MM    Lymphocytes Absolute 1.5 K/CU MM    Monocytes Absolute 1.2 K/CU MM    Eosinophils Absolute 0.1 K/CU MM    Basophils Absolute 0.1 K/CU MM    Nucleated RBC % 0.0 %    Total Nucleated RBC 0.0 K/CU MM    Total Immature Neutrophil 0.11 K/CU MM    Immature Neutrophil % 0.6 (H) 0 - 0.43 %   Comprehensive Metabolic Panel w/ Reflex to MG   Result Value Ref Range    Sodium 132 (L) 135 - 145 MMOL/L    Potassium 5.4 (H) 3.5 - 5.1 MMOL/L    Chloride 93 (L) 99 - 110 mMol/L    CO2 28 21 - 32 MMOL/L    BUN 50 (H) 6 - 23 MG/DL    CREATININE 4.0 (H) 0.9 - 1.3 MG/DL    Glucose 138 (H) 70 - 99 MG/DL    Calcium 8.4 8.3 - 10.6 MG/DL    Albumin 2.5 (L) 3.4 - 5.0 GM/DL    Total Protein 5.7 (L) 6.4 - 8.2 GM/DL    Total Bilirubin 0.2 0.0 - 1.0 MG/DL    ALT 32 10 - 40 U/L    AST 26 15 - 37 IU/L    Alkaline Phosphatase 1,408 (H) 40 - 129 IU/L    GFR Non- 17 (L) >60 mL/min/1.73m2    GFR  21 (L) >60 mL/min/1.73m2    Anion Gap 11 4 - 16   Lipase   Result Value Ref Range    Lipase 7 (L) 13 - 60 IU/L   Troponin   Result Value Ref Range    Troponin T 2.310 (HH) <0.01 NG/ML   Lactate, Sepsis   Result Value Ref Range    Lactic Acid, Sepsis 0.9 0.5 - 1.9 mMOL/L   Blood Gas, Venous   Result Value Ref Range    pH, Walt 7.42 7.32 - 7.42    pCO2, Walt 50 38 - 52 mmHG    pO2, Walt 60 (H) 28 - 48 mmHG    Base Exc, Mixed 6.6 (H) 0 - 1.2    HCO3, Venous 32.4 (H) 19 - 25 MMOL/L    O2 Sat, Walt 87.8 (H) 50 - 70 %    Comment VBG    POCT Glucose   Result Value Ref Range    POC Glucose 129 (H) 70 - 99 MG/DL   TYPE AND SCREEN   Result Value Ref Range    ABO/Rh O NEGATIVE     Antibody Screen NEGATIVE     Unit Number [id number]     Component LEUKO-POOR RED CELLS     Unit Divison 00     Status ISSUED     Transfusion Status OK TO TRANSFUSE     Crossmatch Result COMPATIBLE       Radiographs (if obtained):  [] The following radiograph was interpreted by myself in the absence of a radiologist:  [x]Radiologist's Report Reviewed:  XR CHEST PORTABLE   Final Result   1. Enlarged cardiac silhouette with vascular congestion as well as small   right and moderate left pleural effusions. 2. Left basilar airspace opacities compatible with atelectasis or pneumonia. EKG (if obtained): (All EKG's are interpreted by myself in the absence of a cardiologist)  Normal sinus rhythm with a rate of 81. OH interval 148, QRS 90, QTc 432. No ST elevations or depressions. Normal T waves. Possible left atrial enlargement. Impression: Nonspecific EKG. No previous EKGs for comparison. MDM:  Differential diagnoses considered include but are not limited to sepsis, pneumonia, soft tissue infection, bacteremia. Labs were obtained and showed anemia as well as elevated creatinine and BUN reflective of his known ESRD. His troponin is elevated the EKG is unremarkable. I suspect the troponin is chronically elevated due to his known ESRD. He has had a leukocytosis. Blood cultures were drawn. Rapid Covid is negative. Chest x-ray shows large cardiac silhouette with vascular congestion and bilateral pleural effusions. Angelina Erwin send wound cultures on the wound on his buttocks.  Will start patient on broad-spectrum antibiotics with vancomycin and cefepime. He was given a unit of blood for his hemoglobin of 6.4. Initial lactic acid level is normal.    Heart rate and blood pressure remained stable throughout his stay in the emergency department. He falls asleep his oxygen level does drop. He does have a history of requiring oxygen previously. I did not start patient on IV fluids due to his history of ESRD and concern for fluid overload. He will get a large amount of blood volume with the blood transfusion. No signs of bleeding on my evaluation. Plan admit to the hospital for further evaluation and treatment of his sepsis of unknown origin. I spoke with Dr. Yahir Matos, who graciously agreed to admit the patient. Plan of care explained to patient. All questions and concerns were addressed to the patient's satisfaction. Patient understood and agreed with plan. I did don appropriate PPE (including face mask, protective eye ware/safety glasses and gloves), as recommended by the health facility/national standard best practice, during my bedside interactions with the patient. Clinical Impression:  1. Septicemia ([address]    2. Leukocytosis, unspecified type    3. Anemia, unspecified type    4. Elevated troponin    5. ESRD (end stage renal disease) ([address]          Srinivas Pitts MD       Please note that portions of this note may have been complete with a voice recognition program.  Effortswere made to edit the dictations, but occasional words are mis-transcribed.           Srinivas Pitts MD  08/02/21 0144

## 2025-06-26 NOTE — ASSESSMENT & PLAN NOTE
BG goal 140-180  Type 2 DM. S/p R toe amputation on 6/22.     Plan:  Continue Lantus 24 units once daily   Continue Novolog 8 units TID with meals   Low Dose Correction Scale  BG monitoring ac/hs    ** Please call Endocrine for any BG related issues **      Lab Results   Component Value Date    HGBA1C 12.4 (H) 06/20/2025     Bedside nurse to instruct on insulin pen use and blood sugar monitor. Have patient administer own injections after education completed supervised by nurse.    Discharge plans:  Likely will d/c with MDI regimen given poorly controlled A1c. Please notify Endocrine prior to d/c for updated recs  Lantus 24 units once daily  Novolog 8 units TID with meals in addition to the following correction scale prn ac/hs  150 - 200 + 1 unit  201 - 250 + 2 units  251 - 300 + 3 units  301 - 350 + 4 units   > 350   + 5 units    Patient would benefit from Dexcom at discharge if insurance approved. Will need follow up in Endocrine clinic and outpatient DM education scheduled at time of discharge.

## 2025-06-26 NOTE — PROGRESS NOTES
"[facility] Step[facility]  Endocrinology  Progress Note    Admit Date: 2025     Reason for Consult: Management of T2DM, Hyperglycemia     Surgical Procedure and Date:  s/p right toe amputation 2025    Diabetes diagnosis year:     Home Diabetes Medications:  None currently     Lab Results   Component Value Date    HGBA1C 6.6 (H) 2023       How often checking glucose at home? 1x per week   BG readings on regimen: 120s  Hypoglycemia on the regimen?  N/A  Missed doses on regimen?  N/A     Diabetes Complications include:     Hyperglycemia    Complicating diabetes co morbidities:   HTN, CHF      HPI:   Patient is a 69 y.o. 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  He was admitted for syncope 2022 at which time he was found to have an evolving right cerebral acute subdural hematoma and acute basal cistern subarachnoid hemorrhage. Patient presented to outside hospital emergency room with generalized weakness.  Patient is a poor historian.  He reports falling 3 times at home and has had weakness over the past week.  EMS called, noted sugar greater than 500.  Upon arrival, oral temperature is 101.1° F. patient complaining of dysuria, inability to "hold his urine".  Patient states he saw a doctor today, unable to give a urine sample, but had his INR drawn which was greater than 4.5. Endocrinology consulted for management of T2DM.     Interval HPI:   No acute events overnight. Patient in room 3098/3098 A. Blood glucose stable. BG at goal on current insulin regimen (SSI, prandial, and basal insulin ). Steroid use- None .   4 Days Post-Op  Renal function- Normal   Vasopressors-  None     Diet Consistent Carbohydrate 2000 Calories (up to 75 gm per meal)     Eatin%  Nausea: No  Hypoglycemia and intervention: No  Fever: No  TPN and/or TF: No    /72 (BP Location: Left arm, Patient " "Position: Lying)   Pulse 81   Temp 98.5 °F (36.9 °C)   Resp 18   Ht 6' 2" (1.88 m)   Wt 72.8 kg (160 lb 7.9 oz)   SpO2 98%   BMI 20.61 kg/m²     Labs Reviewed and Include    Recent Labs   Lab 06/26/25  0324   *   CALCIUM 8.3*   *   K 4.1   CO2 24      BUN 19   CREATININE 1.2     Lab Results   Component Value Date    WBC 11.12 06/26/2025    HGB 9.2 (L) 06/26/2025    HCT 29.3 (L) 06/26/2025    MCV 90 06/26/2025     06/26/2025     No results for input(s): "TSH", "FREET4" in the last 168 hours.  Lab Results   Component Value Date    HGBA1C 12.4 (H) 06/20/2025       Nutritional status:   Body mass index is 20.61 kg/m².  Lab Results   Component Value Date    ALBUMIN 2.0 (L) 06/25/2025    ALBUMIN 2.2 (L) 06/23/2025    ALBUMIN 2.6 (L) 06/20/2025     Lab Results   Component Value Date    PREALBUMIN 5 (L) 06/25/2025    PREALBUMIN 5 (L) 06/23/2025    PREALBUMIN 5 (L) 06/20/2025       Estimated Creatinine Clearance: 59.8 mL/min (based on SCr of 1.2 mg/dL).    Accu-Checks  Recent Labs     06/24/25  1145 06/24/25  1604 06/24/25  2037 06/25/25  0634 06/25/25  0706 06/25/25  1153 06/25/25  1605 06/25/25  2047 06/26/25  0632 06/26/25  0757   POCTGLUCOSE 133* 92 161* 159* 142* 149* 90 225* 137* 156*       Current Medications and/or Treatments Impacting Glycemic Control  Immunotherapy:    Immunosuppressants       None          Steroids:   Hormones (From admission, onward)      None          Pressors:    Autonomic Drugs (From admission, onward)      None          Hyperglycemia/Diabetes Medications:   Antihyperglycemics (From admission, onward)      Start     Stop Route Frequency Ordered    06/26/25 0715  insulin aspart U-100 pen 7 Units         -- SubQ 3 times daily with meals 06/25/25 1616    06/22/25 0900  insulin glargine U-100 (Lantus) pen 24 Units         -- SubQ Daily 06/22/25 0736    06/20/25 1449  insulin aspart U-100 pen 0-5 Units         -- SubQ Before meals & nightly PRN 06/20/25 1349      "       ASSESSMENT and PLAN    Cardiac/Vascular  Hyperlipidemia  May increase insulin resistance.         ID  Acute osteomyelitis of toe, right  S/p right toe amputation  Infection may elevate BG readings  On IV antibiotics  Managed per primary team  Avoid hypoglycemia        Endocrine  * Type 2 diabetes mellitus  BG goal 140-180  Type 2 DM. S/p R toe amputation on 6/22.     Plan:  Continue Lantus 24 units once daily   Continue Novolog 8 units TID with meals   Low Dose Correction Scale  BG monitoring ac/hs    ** Please call Endocrine for any BG related issues **      Lab Results   Component Value Date    HGBA1C 12.4 (H) 06/20/2025     Bedside nurse to instruct on insulin pen use and blood sugar monitor. Have patient administer own injections after education completed supervised by nurse.    Discharge plans:  Likely will d/c with MDI regimen given poorly controlled A1c. Please notify Endocrine prior to d/c for updated recs  Lantus 24 units once daily  Novolog 8 units TID with meals in addition to the following correction scale prn ac/hs  150 - 200 + 1 unit  201 - 250 + 2 units  251 - 300 + 3 units  301 - 350 + 4 units   > 350   + 5 units    Patient would benefit from Dexcom at discharge if insurance approved. Will need follow up in Endocrine clinic and outpatient DM education scheduled at time of discharge.         Douglas Kim PA-C  Endocrinology  [facility]

## 2025-06-26 NOTE — SUBJECTIVE & OBJECTIVE
Interval History: naeo.tolerating zosyn without side effects.    Review of Systems   Constitutional:  Negative for chills, fatigue and fever.   HENT:  Negative for ear pain, mouth sores, nosebleeds, postnasal drip, rhinorrhea, sinus pressure, sore throat, tinnitus, trouble swallowing and voice change.    Eyes:  Negative for photophobia, pain, redness and visual disturbance.   Respiratory:  Negative for apnea, cough, chest tightness, shortness of breath and wheezing.    Cardiovascular:  Negative for chest pain, palpitations and leg swelling.   Gastrointestinal:  Negative for abdominal pain, blood in stool, constipation, diarrhea, nausea and vomiting.   Endocrine: Negative for cold intolerance, heat intolerance, polydipsia and polyuria.   Genitourinary:  Negative for decreased urine volume, difficulty urinating, dysuria, flank pain, frequency, genital sores, hematuria, penile discharge, penile pain, penile swelling, scrotal swelling, testicular pain and urgency.   Musculoskeletal:  Negative for arthralgias, back pain, joint swelling, myalgias and neck pain.   Skin:  Positive for wound. Negative for color change and rash.   Allergic/Immunologic: Negative for environmental allergies and food allergies.   Neurological:  Negative for dizziness, seizures, syncope, weakness, light-headedness, numbness and headaches.   Hematological:  Negative for adenopathy. Does not bruise/bleed easily.   Psychiatric/Behavioral:  Negative for agitation, confusion, decreased concentration, hallucinations, self-injury, sleep disturbance and suicidal ideas. The patient is not nervous/anxious.      Objective:     Vital Signs (Most Recent):  Temp: 98.5 °F (36.9 °C) (06/26/25 0738)  Pulse: 77 (06/26/25 1000)  Resp: 18 (06/26/25 0737)  BP: 106/72 (06/26/25 0738)  SpO2: 98 % (06/26/25 0942) Vital Signs (24h Range):  Temp:  [97.8 °F (36.6 °C)-98.5 °F (36.9 °C)] 98.5 °F (36.9 °C)  Pulse:  [] 77  Resp:  [18] 18  SpO2:  [98 %-99 %] 98 %  BP:  ()/(0-83) 106/72     Weight: 72.8 kg (160 lb 7.9 oz)  Body mass index is 20.61 kg/m².    Estimated Creatinine Clearance: 59.8 mL/min (based on SCr of 1.2 mg/dL).     Physical Exam  Vitals and nursing note reviewed. Exam conducted with a chaperone present.   Constitutional:       Appearance: Normal appearance.   HENT:      Head: Normocephalic and atraumatic.   Eyes:      General: No scleral icterus.        Right eye: No discharge.         Left eye: No discharge.      Conjunctiva/sclera: Conjunctivae normal.   Cardiovascular:      Rate and Rhythm: Normal rate and regular rhythm.      Pulses: Normal pulses.      Heart sounds: No murmur heard.     Comments: VAD Humming sounds  Pulmonary:      Effort: Pulmonary effort is normal. No respiratory distress.      Breath sounds: Normal breath sounds. No stridor. No wheezing or rhonchi.   Abdominal:      General: There is no distension.      Palpations: Abdomen is soft.      Tenderness: There is no abdominal tenderness.      Comments: DLES covered. No tenderness to palpation.   Musculoskeletal:      Comments: Right foot covered with compression bandage.   Skin:     General: Skin is warm and dry.   Neurological:      General: No focal deficit present.      Mental Status: He is alert and oriented to person, place, and time.          Significant Labs: CBC:   Recent Labs   Lab 06/25/25  0246 06/26/25  0324   WBC 12.91* 11.12   HGB 9.0* 9.2*   HCT 28.3* 29.3*    238     CMP:   Recent Labs   Lab 06/25/25  0246 06/26/25  0324   * 135*   K 4.2 4.1    104   CO2 24 24   * 130*   BUN 22 19   CREATININE 1.2 1.2   CALCIUM 8.5* 8.3*   PROT 8.5*  --    ALBUMIN 2.0*  --    BILITOT 0.9  --    ALKPHOS 257*  --    AST 47*  --    ALT 21  --    ANIONGAP 9 7*     Microbiology Results (last 7 days)       Procedure Component Value Units Date/Time    Culture, Anaerobic [[id number]] Collected: 06/22/25 1450    Order Status: Completed Specimen: Bone from Toe, Right Foot  Updated: 06/26/25 1056     Anaerobe Culture No Anaerobes Isolated    Blood culture [[id number]]  (Normal) Collected: 06/23/25 0646    Order Status: Completed Specimen: Blood from Peripheral, Antecubital, Right Updated: 06/26/25 0901     Blood Culture No Growth After 72 Hours    Aerobic culture [[id number]]  (Abnormal)  (Susceptibility) Collected: 06/22/25 1450    Order Status: Completed Specimen: Bone from Toe, Right Foot Updated: 06/25/25 1235     CULTURE, AEROBIC Few Enterococcus faecalis    Afb Culture Stain [[id number]] Collected: 06/22/25 1450    Order Status: Completed Specimen: Bone from Toe, Right Foot Updated: 06/23/25 1518     ACID FAST STAIN  No acid fast bacilli seen    Fungus culture [[id number]]  (Normal) Collected: 06/22/25 1450    Order Status: Completed Specimen: Bone from Toe, Right Foot Updated: 06/23/25 1009     Fungal Culture Culture In Progress    Gram stain [[id number]] Collected: 06/22/25 1450    Order Status: Completed Specimen: Bone from Toe, Right Foot Updated: 06/22/25 1857     GRAM STAIN No WBCs, epithelial cells or organisms seen    AFB Culture & Smear [[id number]] Collected: 06/22/25 1450    Order Status: Resulted Specimen: Bone from Toe, Right Foot Updated: 06/22/25 1640    Culture, Anaerobe [[id number]]     Order Status: Sent Specimen: Skin     Aerobic culture [[id number]]     Order Status: Sent Specimen: Skin             Significant Imaging: I have reviewed all pertinent imaging results/findings within the past 24 hours.

## 2025-06-26 NOTE — PLAN OF CARE
Recommendations     Recommendation/Intervention:   1. Continue diabetic diet as tolerated     - please continue to document PO % intake via flowsheets     2. Recommend boost glucose control chocolate TID   3. Encourage good intake      4. RD to monitor weight, labs, intake, tolerance  5. Recommend MVI     Goals:   1. % nutritional needs met with diet during admission     2. Maintain weight during admission  3. Display s/s of wound healing during admission   Nutrition Goal Status: progressing towards goal  Communication of RD Recs: other (comment) (POC)     Nutrition Discharge Planning     Nutrition Discharge Planning: Therapeutic diet (comments), Oral supplement regimen (comments)  Therapeutic diet (comments): cardiac diabetic diet  Oral supplement regimen (comments): oral nutrition supplement of choice and MVI

## 2025-06-27 LAB
ABSOLUTE EOSINOPHIL (OHS): 0.29 K/UL
ABSOLUTE MONOCYTE (OHS): 1.11 K/UL (ref 0.3–1)
ABSOLUTE NEUTROPHIL COUNT (OHS): 6.59 K/UL (ref 1.8–7.7)
ALBUMIN SERPL BCP-MCNC: 2.1 G/DL (ref 3.5–5.2)
ALP SERPL-CCNC: 201 UNIT/L (ref 40–150)
ALT SERPL W/O P-5'-P-CCNC: 15 UNIT/L (ref 10–44)
ANION GAP (OHS): 8 MMOL/L (ref 8–16)
APTT PPP: 32.5 SECONDS (ref 21–32)
AST SERPL-CCNC: 30 UNIT/L (ref 11–45)
BASOPHILS # BLD AUTO: 0.04 K/UL
BASOPHILS NFR BLD AUTO: 0.4 %
BILIRUB DIRECT SERPL-MCNC: 0.5 MG/DL (ref 0.1–0.3)
BILIRUB SERPL-MCNC: 0.9 MG/DL (ref 0.1–1)
BNP SERPL-MCNC: 542 PG/ML (ref 0–99)
BUN SERPL-MCNC: 14 MG/DL (ref 8–23)
CALCIUM SERPL-MCNC: 8.1 MG/DL (ref 8.7–10.5)
CHLORIDE SERPL-SCNC: 105 MMOL/L (ref 95–110)
CO2 SERPL-SCNC: 23 MMOL/L (ref 23–29)
CREAT SERPL-MCNC: 1 MG/DL (ref 0.5–1.4)
CRP SERPL-MCNC: 82.6 MG/L
ERYTHROCYTE [DISTWIDTH] IN BLOOD BY AUTOMATED COUNT: 14.4 % (ref 11.5–14.5)
GFR SERPLBLD CREATININE-BSD FMLA CKD-EPI: >60 ML/MIN/1.73/M2
GLUCOSE SERPL-MCNC: 82 MG/DL (ref 70–110)
HCT VFR BLD AUTO: 27 % (ref 40–54)
HGB BLD-MCNC: 8.6 GM/DL (ref 14–18)
IMM GRANULOCYTES # BLD AUTO: 0.09 K/UL (ref 0–0.04)
IMM GRANULOCYTES NFR BLD AUTO: 0.9 % (ref 0–0.5)
INR PPP: 1.6 (ref 0.8–1.2)
LDH SERPL-CCNC: 207 U/L (ref 110–260)
LYMPHOCYTES # BLD AUTO: 1.91 K/UL (ref 1–4.8)
MAGNESIUM SERPL-MCNC: 1.9 MG/DL (ref 1.6–2.6)
MCH RBC QN AUTO: 28.8 PG (ref 27–31)
MCHC RBC AUTO-ENTMCNC: 31.9 G/DL (ref 32–36)
MCV RBC AUTO: 90 FL (ref 82–98)
NUCLEATED RBC (/100WBC) (OHS): 0 /100 WBC
OHS QRS DURATION: 180 MS
OHS QTC CALCULATION: 628 MS
PHOSPHATE SERPL-MCNC: 2.5 MG/DL (ref 2.7–4.5)
PLATELET # BLD AUTO: 243 K/UL (ref 150–450)
PMV BLD AUTO: 11 FL (ref 9.2–12.9)
POCT GLUCOSE: 100 MG/DL (ref 70–110)
POCT GLUCOSE: 101 MG/DL (ref 70–110)
POCT GLUCOSE: 104 MG/DL (ref 70–110)
POCT GLUCOSE: 73 MG/DL (ref 70–110)
POTASSIUM SERPL-SCNC: 3.9 MMOL/L (ref 3.5–5.1)
PREALB SERPL-MCNC: 6 MG/DL (ref 20–43)
PROT SERPL-MCNC: 8.7 GM/DL (ref 6–8.4)
PROTHROMBIN TIME: 17.2 SECONDS (ref 9–12.5)
RBC # BLD AUTO: 2.99 M/UL (ref 4.6–6.2)
RELATIVE EOSINOPHIL (OHS): 2.9 %
RELATIVE LYMPHOCYTE (OHS): 19 % (ref 18–48)
RELATIVE MONOCYTE (OHS): 11.1 % (ref 4–15)
RELATIVE NEUTROPHIL (OHS): 65.7 % (ref 38–73)
SODIUM SERPL-SCNC: 136 MMOL/L (ref 136–145)
WBC # BLD AUTO: 10.03 K/UL (ref 3.9–12.7)

## 2025-06-27 PROCEDURE — 27000248 HC VAD-ADDITIONAL DAY

## 2025-06-27 PROCEDURE — 25000003 PHARM REV CODE 250: Performed by: PHYSICIAN ASSISTANT

## 2025-06-27 PROCEDURE — 83735 ASSAY OF MAGNESIUM: CPT | Performed by: STUDENT IN AN ORGANIZED HEALTH CARE EDUCATION/TRAINING PROGRAM

## 2025-06-27 PROCEDURE — 99232 SBSQ HOSP IP/OBS MODERATE 35: CPT | Mod: ,,, | Performed by: PHYSICIAN ASSISTANT

## 2025-06-27 PROCEDURE — 80053 COMPREHEN METABOLIC PANEL: CPT | Performed by: BEHAVIOR TECHNICIAN

## 2025-06-27 PROCEDURE — 99233 SBSQ HOSP IP/OBS HIGH 50: CPT | Mod: GC,,, | Performed by: PHYSICIAN ASSISTANT

## 2025-06-27 PROCEDURE — 94761 N-INVAS EAR/PLS OXIMETRY MLT: CPT

## 2025-06-27 PROCEDURE — 63600175 PHARM REV CODE 636 W HCPCS: Performed by: INTERNAL MEDICINE

## 2025-06-27 PROCEDURE — 84134 ASSAY OF PREALBUMIN: CPT | Performed by: STUDENT IN AN ORGANIZED HEALTH CARE EDUCATION/TRAINING PROGRAM

## 2025-06-27 PROCEDURE — 82248 BILIRUBIN DIRECT: CPT | Performed by: STUDENT IN AN ORGANIZED HEALTH CARE EDUCATION/TRAINING PROGRAM

## 2025-06-27 PROCEDURE — 97530 THERAPEUTIC ACTIVITIES: CPT

## 2025-06-27 PROCEDURE — 25000003 PHARM REV CODE 250: Performed by: STUDENT IN AN ORGANIZED HEALTH CARE EDUCATION/TRAINING PROGRAM

## 2025-06-27 PROCEDURE — 25000003 PHARM REV CODE 250: Performed by: INTERNAL MEDICINE

## 2025-06-27 PROCEDURE — 25000003 PHARM REV CODE 250: Performed by: BEHAVIOR TECHNICIAN

## 2025-06-27 PROCEDURE — 85025 COMPLETE CBC W/AUTO DIFF WBC: CPT | Performed by: BEHAVIOR TECHNICIAN

## 2025-06-27 PROCEDURE — 85610 PROTHROMBIN TIME: CPT | Performed by: STUDENT IN AN ORGANIZED HEALTH CARE EDUCATION/TRAINING PROGRAM

## 2025-06-27 PROCEDURE — 36415 COLL VENOUS BLD VENIPUNCTURE: CPT | Performed by: BEHAVIOR TECHNICIAN

## 2025-06-27 PROCEDURE — 86140 C-REACTIVE PROTEIN: CPT | Performed by: STUDENT IN AN ORGANIZED HEALTH CARE EDUCATION/TRAINING PROGRAM

## 2025-06-27 PROCEDURE — 20600001 HC STEP DOWN PRIVATE ROOM

## 2025-06-27 PROCEDURE — 84100 ASSAY OF PHOSPHORUS: CPT | Performed by: STUDENT IN AN ORGANIZED HEALTH CARE EDUCATION/TRAINING PROGRAM

## 2025-06-27 PROCEDURE — 85730 THROMBOPLASTIN TIME PARTIAL: CPT | Performed by: STUDENT IN AN ORGANIZED HEALTH CARE EDUCATION/TRAINING PROGRAM

## 2025-06-27 PROCEDURE — 83615 LACTATE (LD) (LDH) ENZYME: CPT | Performed by: STUDENT IN AN ORGANIZED HEALTH CARE EDUCATION/TRAINING PROGRAM

## 2025-06-27 PROCEDURE — 83880 ASSAY OF NATRIURETIC PEPTIDE: CPT | Performed by: STUDENT IN AN ORGANIZED HEALTH CARE EDUCATION/TRAINING PROGRAM

## 2025-06-27 PROCEDURE — 97116 GAIT TRAINING THERAPY: CPT

## 2025-06-27 PROCEDURE — 93750 INTERROGATION VAD IN PERSON: CPT | Mod: ,,, | Performed by: INTERNAL MEDICINE

## 2025-06-27 PROCEDURE — 93005 ELECTROCARDIOGRAM TRACING: CPT

## 2025-06-27 PROCEDURE — 93010 ELECTROCARDIOGRAM REPORT: CPT | Mod: ,,, | Performed by: STUDENT IN AN ORGANIZED HEALTH CARE EDUCATION/TRAINING PROGRAM

## 2025-06-27 PROCEDURE — 97535 SELF CARE MNGMENT TRAINING: CPT

## 2025-06-27 RX ORDER — INSULIN GLARGINE 100 [IU]/ML
15 INJECTION, SOLUTION SUBCUTANEOUS DAILY
Status: DISCONTINUED | OUTPATIENT
Start: 2025-06-28 | End: 2025-06-28

## 2025-06-27 RX ORDER — INSULIN ASPART 100 [IU]/ML
6 INJECTION, SOLUTION INTRAVENOUS; SUBCUTANEOUS
Status: DISCONTINUED | OUTPATIENT
Start: 2025-06-27 | End: 2025-06-27

## 2025-06-27 RX ORDER — INSULIN ASPART 100 [IU]/ML
4 INJECTION, SOLUTION INTRAVENOUS; SUBCUTANEOUS
Status: DISCONTINUED | OUTPATIENT
Start: 2025-06-27 | End: 2025-06-28

## 2025-06-27 RX ORDER — POTASSIUM CHLORIDE 750 MG/1
30 CAPSULE, EXTENDED RELEASE ORAL ONCE
Status: COMPLETED | OUTPATIENT
Start: 2025-06-27 | End: 2025-06-27

## 2025-06-27 RX ORDER — LANOLIN ALCOHOL/MO/W.PET/CERES
400 CREAM (GRAM) TOPICAL ONCE
Status: COMPLETED | OUTPATIENT
Start: 2025-06-27 | End: 2025-06-27

## 2025-06-27 RX ORDER — INSULIN GLARGINE 100 [IU]/ML
18 INJECTION, SOLUTION SUBCUTANEOUS DAILY
Status: DISCONTINUED | OUTPATIENT
Start: 2025-06-28 | End: 2025-06-27

## 2025-06-27 RX ADMIN — INSULIN GLARGINE 24 UNITS: 100 INJECTION, SOLUTION SUBCUTANEOUS at 08:06

## 2025-06-27 RX ADMIN — PIPERACILLIN SODIUM AND TAZOBACTAM SODIUM 4.5 G: 4; .5 INJECTION, POWDER, FOR SOLUTION INTRAVENOUS at 05:06

## 2025-06-27 RX ADMIN — INSULIN ASPART 6 UNITS: 100 INJECTION, SOLUTION INTRAVENOUS; SUBCUTANEOUS at 12:06

## 2025-06-27 RX ADMIN — POLYETHYLENE GLYCOL 3350 17 G: 17 POWDER, FOR SOLUTION ORAL at 08:06

## 2025-06-27 RX ADMIN — POTASSIUM CHLORIDE 30 MEQ: 750 CAPSULE, EXTENDED RELEASE ORAL at 08:06

## 2025-06-27 RX ADMIN — INSULIN ASPART 7 UNITS: 100 INJECTION, SOLUTION INTRAVENOUS; SUBCUTANEOUS at 08:06

## 2025-06-27 RX ADMIN — ATORVASTATIN CALCIUM 80 MG: 40 TABLET, FILM COATED ORAL at 08:06

## 2025-06-27 RX ADMIN — AMLODIPINE BESYLATE 10 MG: 10 TABLET ORAL at 08:06

## 2025-06-27 RX ADMIN — WARFARIN SODIUM 5 MG: 5 TABLET ORAL at 05:06

## 2025-06-27 RX ADMIN — PIPERACILLIN SODIUM AND TAZOBACTAM SODIUM 4.5 G: 4; .5 INJECTION, POWDER, FOR SOLUTION INTRAVENOUS at 02:06

## 2025-06-27 RX ADMIN — Medication 400 MG: at 08:06

## 2025-06-27 RX ADMIN — SPIRONOLACTONE 25 MG: 25 TABLET, FILM COATED ORAL at 08:06

## 2025-06-27 NOTE — NURSING
"LVAD DLES dressing changed under sterile technique, using VAD kit. DLES is a "1" with no redness or tenderness around the exit site. Pt tolerated well, no bleeding or active drainage noted. Dressing due to be changed 6/29/2025.   "

## 2025-06-27 NOTE — ASSESSMENT & PLAN NOTE
Likely source for septic shock on presentation.  -podiatry seeing pt,  s/p amputation 6/22.   -Continue IV zosyn, Needs 6 week course of IV zosyn for the associtated enterococcus bacteremia.  -cont holding diuretics

## 2025-06-27 NOTE — PROGRESS NOTES
06/27/2025  Eddie Conner    Current provider:  Mike Chauhan MD    Device interrogation:      6/27/2025     7:57 AM 6/27/2025     4:50 AM 6/26/2025    11:39 PM 6/26/2025     8:07 PM 6/26/2025     4:30 PM 6/26/2025    11:56 AM 6/26/2025     7:42 AM   TXP LVAD INTERROGATIONS   Type HeartMate3 HeartMate3 HeartMate3 HeartMate3 HeartMate3 HeartMate3 HeartMate3   Flow 4.3 4.3 4.3 4.2 4.4 4.4 4.5   Speed 5200 5200 5250 5250 5200 5200 5200   PI 6.5 5.9 6.5 6.5 7 6.1 6.2   Power (Edwards) 3.6 3.6 3.6 3.7 3.7 3.6 3.6   LSL 4800 4800 4850 4850 4800 4800 4800   Low Flow Alarm no no no no no no no   Pulsatility Pulse Pulse Pulse Pulse Pulse Pulse Pulse          Rounded on [place] to ensure all mechanical assist device settings (IABP or VAD) were appropriate and all parameters were within limits.  I was able to ensure all back up equipment was present, the staff had no issues, and the Perfusion Department daily rounding was complete.      For implantable VADs: Interrogation of Ventricular assist device was performed with analysis of device parameters and review of device function. I have personally reviewed the interrogation findings and agree with findings as stated.     In emergency, the nursing units have been notified to contact the perfusion department either by:  Calling [phone] from 630am to 4pm Mon thru Fri, utilizing the On-Call Finder functionality of Epic and searching for Perfusion, or by contacting the hospital  from 4pm to 630am and on weekends and asking to speak with the perfusionist on call.    10:50 AM

## 2025-06-27 NOTE — PLAN OF CARE
LVAD number and doppler within normal limits. LVAD dressing CDI. Pt educated on fall risk overnight,pt remained free from falls/trauma/injury. Denies chest pain, SOB, palpitations, dizziness, pain, or discomfort. Plan of care reviewed with pt, all questions answered. Bed locked in lowest position, call bell within reach, no acute distress noted, will continue to monitor.

## 2025-06-27 NOTE — ASSESSMENT & PLAN NOTE
Procedure: Device Interrogation Including analysis of device parameters  Current Settings: Ventricular Assist Device  INR 1.6, will continue coumadin  INR therapeutic, Continue coumadin  Review of device function is stable/unstable stable        6/27/2025     7:57 AM 6/27/2025     4:50 AM 6/26/2025    11:39 PM 6/26/2025     8:07 PM 6/26/2025     4:30 PM 6/26/2025    11:56 AM 6/26/2025     7:42 AM   TXP LVAD INTERROGATIONS   Type HeartMate3 HeartMate3 HeartMate3 HeartMate3 HeartMate3 HeartMate3 HeartMate3   Flow 4.3 4.3 4.3 4.2 4.4 4.4 4.5   Speed 5200 5200 5250 5250 5200 5200 5200   PI 6.5 5.9 6.5 6.5 7 6.1 6.2   Power (Edwards) 3.6 3.6 3.6 3.7 3.7 3.6 3.6   LSL 4800 4800 4850 4850 4800 4800 4800   Low Flow Alarm no no no no no no no   Pulsatility Pulse Pulse Pulse Pulse Pulse Pulse Pulse

## 2025-06-27 NOTE — NURSING
BG 73, asymptomatic, notified Silviano, and Armando FOFANA is ok to hold scheduled insulin. Recheck  after dink juice.

## 2025-06-27 NOTE — ASSESSMENT & PLAN NOTE
BG goal 140-180  Type 2 DM. S/p R toe amputation on 6/22.     Plan:  Decrease Lantus 18 units once daily   Decrease Novolog 6 units TID with meals   Low Dose Correction Scale  BG monitoring ac/hs    ** Please call Endocrine for any BG related issues **      Lab Results   Component Value Date    HGBA1C 12.4 (H) 06/20/2025     Bedside nurse to instruct on insulin pen use and blood sugar monitor. Have patient administer own injections after education completed supervised by nurse.    Discharge plans:  Likely will d/c with MDI regimen given poorly controlled A1c. Please notify Endocrine prior to d/c for updated recs  Lantus 18 units once daily  Novolog 6 units TID with meals in addition to the following correction scale prn ac/hs  150 - 200 + 1 unit  201 - 250 + 2 units  251 - 300 + 3 units  301 - 350 + 4 units   > 350   + 5 units    Patient would benefit from Dexcom at discharge if insurance approved. Will need follow up in Endocrine clinic and outpatient DM education scheduled at time of discharge.

## 2025-06-27 NOTE — ASSESSMENT & PLAN NOTE
69 year old male with diabetic ulcer to the right hallux. Marked leukocytosis and E coli bacteremia.     Assessment:  Ulceration to the right hallux with depth to bone.  XR negative for convincing bony destruction.  Now status post partial 1st ray amputation, 6/22/25.    Plan:   - Patient seen and evaluated bedside by Podiatry.  Dressing change, plan of care discussed.  - Right foot dressed with light betadine paint, xeroform overlying incision, gauze padding, kerlix, ace to secure  - WBAT heel emphasis short distances in Darco shoe.    - PT/OT for mobility, appreciate recommendations.   - Abx per ID, appreciate recommendations. Tracing pre-operative wound cultures, intra-op clean margins.   - Podiatry will follow. Please reach out with any questions.     Future discharge recs:  - Patient to follow up in Podiatry Clinic outpatient, Podiatry will arrange.  - SNF or HH to apply bandaging as described, x3/week: xeroform overlying incision, gauze padding, kerlix, ace to secure  - Abx plan per ID  - WBAT heel emphasis short distances in Darco shoe.   - Patient to keep dressings clean dry and intact  - Patient explained the importance of daily foot checks

## 2025-06-27 NOTE — SUBJECTIVE & OBJECTIVE
"Interval HPI:   No acute events overnight. Patient in room 3098/3098 A. Blood glucose stable. BG below goal on current insulin regimen (SSI, prandial, and basal insulin ). Steroid use- None .   5 Days Post-Op  Renal function- Normal   Vasopressors-  None     Diet Consistent Carbohydrate 2000 Calories (up to 75 gm per meal)     Eatin%  Nausea: No  Hypoglycemia and intervention: No  Fever: No  TPN and/or TF: No     BP (!) 90/0 (BP Location: Right arm, Patient Position: Lying)   Pulse 83   Temp 98.5 °F (36.9 °C) (Oral)   Resp 18   Ht 6' 2" (1.88 m)   Wt 74.3 kg (163 lb 12.8 oz)   SpO2 98%   BMI 21.03 kg/m²     Labs Reviewed and Include    Recent Labs   Lab 25  0427   GLU 82   CALCIUM 8.1*   ALBUMIN 2.1*   PROT 8.7*      K 3.9   CO2 23      BUN 14   CREATININE 1.0   ALKPHOS 201*   ALT 15   AST 30   BILITOT 0.9     Lab Results   Component Value Date    WBC 10.03 2025    HGB 8.6 (L) 2025    HCT 27.0 (L) 2025    MCV 90 2025     2025     No results for input(s): "TSH", "FREET4" in the last 168 hours.  Lab Results   Component Value Date    HGBA1C 12.4 (H) 2025       Nutritional status:   Body mass index is 21.03 kg/m².  Lab Results   Component Value Date    ALBUMIN 2.1 (L) 2025    ALBUMIN 2.0 (L) 2025    ALBUMIN 2.2 (L) 2025     Lab Results   Component Value Date    PREALBUMIN 6 (L) 2025    PREALBUMIN 5 (L) 2025    PREALBUMIN 5 (L) 2025       Estimated Creatinine Clearance: 73.3 mL/min (based on SCr of 1 mg/dL).    Accu-Checks  Recent Labs     25  0706 25  1153 25  1605 25  2047 25  0632 25  0757 25  1146 25  1624 25  0635   POCTGLUCOSE 142* 149* 90 225* 137* 156* 159* 110 83 100       Current Medications and/or Treatments Impacting Glycemic Control  Immunotherapy:    Immunosuppressants       None          Steroids:   Hormones (From admission, " onward)      None          Pressors:    Autonomic Drugs (From admission, onward)      None          Hyperglycemia/Diabetes Medications:   Antihyperglycemics (From admission, onward)      Start     Stop Route Frequency Ordered    06/28/25 0900  insulin glargine U-100 (Lantus) pen 18 Units         -- SubQ Daily 06/27/25 0908    06/27/25 1130  insulin aspart U-100 pen 6 Units         -- SubQ 3 times daily with meals 06/27/25 0908    06/20/25 1449  insulin aspart U-100 pen 0-5 Units         -- SubQ Before meals & nightly PRN 06/20/25 1344

## 2025-06-27 NOTE — SUBJECTIVE & OBJECTIVE
Subjective:     Interval History: POD 5 s/p partial right first ray amputation. NAEON. No distress at bedside, cooperative, alert. No pain or complaints at this this time. Surgical site stable. Dressing changed without incident.    Follow-up For: Procedure(s) (LRB):  AMPUTATION, TOE, THROUGH METATARSAL HEAD (Right)    Post-Operative Day: 5 Days Post-Op    Scheduled Meds:   amLODIPine  10 mg Oral Daily    atorvastatin  80 mg Oral Daily    insulin aspart U-100  6 Units Subcutaneous TIDWM    [START ON 6/28/2025] insulin glargine U-100  18 Units Subcutaneous Daily    magnesium citrate  296 mL Oral Once    mirtazapine  30 mg Oral QHS    piperacillin-tazobactam (Zosyn) IV (PEDS and ADULTS) (extended infusion is not appropriate)  4.5 g Intravenous Q8H    polyethylene glycol  17 g Oral Daily    spironolactone  25 mg Oral Daily    warfarin  2.5 mg Oral Every Tues, Thurs, Sat, Sun    warfarin  5 mg Oral Every Mon, Wed, Fri     Continuous Infusions:  PRN Meds:  Current Facility-Administered Medications:     acetaminophen, 650 mg, Oral, Q6H PRN    bisacodyL, 10 mg, Rectal, Daily PRN    chlorproMAZINE, 25 mg, Oral, TID PRN    dextrose 50%, 12.5 g, Intravenous, PRN    dextrose 50%, 25 g, Intravenous, PRN    fentaNYL, 25 mcg, Intravenous, Q5 Min PRN    glucagon (human recombinant), 1 mg, Intramuscular, PRN    glucose, 16 g, Oral, PRN    glucose, 24 g, Oral, PRN    insulin aspart U-100, 0-5 Units, Subcutaneous, QID (AC + HS) PRN    ondansetron, 4 mg, Intravenous, Daily PRN    Review of Systems  Objective:     Vital Signs (Most Recent):  Temp: 98.5 °F (36.9 °C) (06/27/25 0745)  Pulse: 83 (06/27/25 0757)  Resp: 18 (06/27/25 0745)  BP: (!) 84/0 (06/27/25 0745)  SpO2: 98 % (06/27/25 0900) Vital Signs (24h Range):  Temp:  [98.1 °F (36.7 °C)-98.9 °F (37.2 °C)] 98.5 °F (36.9 °C)  Pulse:  [74-93] 83  Resp:  [18] 18  SpO2:  [98 %-99 %] 98 %  BP: ()/(0-77) 84/0     Weight: 74.3 kg (163 lb 12.8 oz)  Body mass index is 21.03  kg/m².    Foot Exam    General  Orientation: alert and oriented to person, place, and time   Affect: appropriate       Right Foot/Ankle     Inspection and Palpation  Ecchymosis: none  Tenderness: none   Swelling: (surgical site)  Skin Exam: skin changes; no drainage, skin not intact, no maceration, no ulcer and no erythema     Neurovascular  Dorsalis pedis: 1+  Posterior tibial: 1+  Saphenous nerve sensation: diminished  Tibial nerve sensation: diminished  Superficial peroneal nerve sensation: diminished  Deep peroneal nerve sensation: diminished  Sural nerve sensation: diminished    Comments  S/p partial 1st ray amputation with sutures intact, no maceration, no gapping, no drainage, no erythema. Normal post operative edema. No signs of infection or dehiscence.             Clinical media:    Laboratory:  All pertinent labs reviewed within the last 24 hours.    Diagnostic Results:  I have reviewed all pertinent imaging results/findings within the past 24 hours..

## 2025-06-27 NOTE — PROGRESS NOTES
[facility] Step[facility]  Heart Transplant  Progress Note    Patient Name: Rocco France  MRN: [id number]  Admission Date: 6/20/2025  Hospital Length of Stay: 7 days  Attending Physician: Mike Chauhan MD  Primary Care Provider: Jay Guardado DO  Principal Problem:Type 2 diabetes mellitus    Subjective:   Interval History: No events overnight. Cultures have procesed w/ E coli and enterococcus faecalis. Needs 6 week course of IV zosyn for the enterococcus bacteremia. Working on HH plan    Continuous Infusions:  Scheduled Meds:   amLODIPine  10 mg Oral Daily    atorvastatin  80 mg Oral Daily    insulin aspart U-100  6 Units Subcutaneous TIDWM    [START ON 6/28/2025] insulin glargine U-100  18 Units Subcutaneous Daily    magnesium citrate  296 mL Oral Once    mirtazapine  30 mg Oral QHS    piperacillin-tazobactam (Zosyn) IV (PEDS and ADULTS) (extended infusion is not appropriate)  4.5 g Intravenous Q8H    polyethylene glycol  17 g Oral Daily    spironolactone  25 mg Oral Daily    warfarin  2.5 mg Oral Every Tues, Thurs, Sat, Sun    warfarin  5 mg Oral Every Mon, Wed, Fri     PRN Meds:  Current Facility-Administered Medications:     acetaminophen, 650 mg, Oral, Q6H PRN    bisacodyL, 10 mg, Rectal, Daily PRN    chlorproMAZINE, 25 mg, Oral, TID PRN    dextrose 50%, 12.5 g, Intravenous, PRN    dextrose 50%, 25 g, Intravenous, PRN    fentaNYL, 25 mcg, Intravenous, Q5 Min PRN    glucagon (human recombinant), 1 mg, Intramuscular, PRN    glucose, 16 g, Oral, PRN    glucose, 24 g, Oral, PRN    insulin aspart U-100, 0-5 Units, Subcutaneous, QID (AC + HS) PRN    ondansetron, 4 mg, Intravenous, Daily PRN    Review of patient's allergies indicates:   Allergen Reactions    Pcn [penicillins] Hives     Objective:     Vital Signs (Most Recent):  Temp: 98.5 °F (36.9 °C) (06/27/25 0745)  Pulse: 79 (06/27/25 1130)  Resp: 18 (06/27/25 0745)  BP: (!) 84/0 (06/27/25 0745)  SpO2: 98 % (06/27/25 0900) Vital Signs (24h  Range):  Temp:  [98.1 °F (36.7 °C)-98.9 °F (37.2 °C)] 98.5 °F (36.9 °C)  Pulse:  [74-93] 79  Resp:  [18] 18  SpO2:  [98 %-99 %] 98 %  BP: ()/(0-77) 84/0     Patient Vitals for the past 72 hrs (Last 3 readings):   Weight   06/27/25 0745 74.3 kg (163 lb 12.8 oz)   06/27/25 0700 74.4 kg (164 lb 0.4 oz)   06/26/25 0615 72.8 kg (160 lb 7.9 oz)     Body mass index is 21.03 kg/m².      Intake/Output Summary (Last 24 hours) at 6/27/2025 1145  Last data filed at 6/26/2025 2007  Gross per 24 hour   Intake 662 ml   Output 200 ml   Net 462 ml       Hemodynamic Parameters:       Telemetry: reviewed       Physical Exam  Vitals reviewed.   Constitutional:       Appearance: He is normal weight.   HENT:      Head: Normocephalic and atraumatic.      Right Ear: External ear normal.      Left Ear: External ear normal.      Nose: Nose normal.      Mouth/Throat:      Mouth: Mucous membranes are moist.      Pharynx: Oropharynx is clear.   Eyes:      Conjunctiva/sclera: Conjunctivae normal.   Cardiovascular:      Rate and Rhythm: Normal rate and regular rhythm.      Pulses: Normal pulses.   Pulmonary:      Effort: Pulmonary effort is normal.      Breath sounds: Normal breath sounds.   Abdominal:      General: Abdomen is flat. Bowel sounds are normal.      Palpations: Abdomen is soft.   Musculoskeletal:      Cervical back: Normal range of motion.      Right lower leg: No edema.      Left lower leg: No edema.   Skin:     General: Skin is warm.      Capillary Refill: Capillary refill takes less than 2 seconds.   Neurological:      Mental Status: He is alert. Mental status is at baseline.   Psychiatric:         Mood and Affect: Mood normal.         Behavior: Behavior normal.            Significant Labs:  CBC:  Recent Labs   Lab 06/25/25  0246 06/26/25  0324 06/27/25  0427   WBC 12.91* 11.12 10.03   RBC 3.12* 3.24* 2.99*   HGB 9.0* 9.2* 8.6*   HCT 28.3* 29.3* 27.0*    238 243   MCV 91 90 90   MCH 28.8 28.4 28.8   MCHC 31.8* 31.4*  31.9*     BNP:  Recent Labs   Lab 06/23/25  0646 06/25/25  0246 06/27/25  0427   * 424* 542*     CMP:  Recent Labs   Lab 06/23/25  0646 06/23/25  0851 06/25/25  0246 06/26/25  0324 06/27/25  0427   GLU  --    < > 125* 130* 82   CALCIUM  --    < > 8.5* 8.3* 8.1*   ALBUMIN 2.2*  --  2.0*  --  2.1*   PROT 9.0*  --  8.5*  --  8.7*   NA  --    < > 133* 135* 136   K  --    < > 4.2 4.1 3.9   CO2  --    < > 24 24 23   CL  --    < > 100 104 105   BUN  --    < > 22 19 14   CREATININE  --    < > 1.2 1.2 1.0   ALKPHOS 207*  --  257*  --  201*   ALT 15  --  21  --  15   AST 28  --  47*  --  30   BILITOT 1.1*  --  0.9  --  0.9    < > = values in this interval not displayed.      Coagulation:   Recent Labs   Lab 06/25/25  0246 06/26/25  0324 06/27/25  0427   INR 1.6* 1.5* 1.6*   APTT 35.6* 33.9* 32.5*     LDH:  Recent Labs   Lab 06/25/25  0246 06/26/25  0324 06/27/25  0427    217 207     Microbiology:  Microbiology Results (last 7 days)       Procedure Component Value Units Date/Time    Blood culture [[id number]]  (Normal) Collected: 06/23/25 0646    Order Status: Completed Specimen: Blood from Peripheral, Antecubital, Right Updated: 06/27/25 0901     Blood Culture No Growth After 96 hours    Culture, Anaerobic [[id number]] Collected: 06/22/25 1450    Order Status: Completed Specimen: Bone from Toe, Right Foot Updated: 06/26/25 1056     Anaerobe Culture No Anaerobes Isolated    Aerobic culture [[id number]]  (Abnormal)  (Susceptibility) Collected: 06/22/25 1450    Order Status: Completed Specimen: Bone from Toe, Right Foot Updated: 06/25/25 1235     CULTURE, AEROBIC Few Enterococcus faecalis    Afb Culture Stain [[id number]] Collected: 06/22/25 1450    Order Status: Completed Specimen: Bone from Toe, Right Foot Updated: 06/23/25 1518     ACID FAST STAIN  No acid fast bacilli seen    Fungus culture [[id number]]  (Normal) Collected: 06/22/25 1450    Order Status: Completed Specimen: Bone from Toe, Right Foot Updated:  06/23/25 1009     Fungal Culture Culture In Progress    Gram stain [[id number]] Collected: 06/22/25 1450    Order Status: Completed Specimen: Bone from Toe, Right Foot Updated: 06/22/25 1857     GRAM STAIN No WBCs, epithelial cells or organisms seen    AFB Culture & Smear [[id number]] Collected: 06/22/25 1450    Order Status: Resulted Specimen: Bone from Toe, Right Foot Updated: 06/22/25 1640    Culture, Anaerobe [[id number]]     Order Status: Sent Specimen: Skin     Aerobic culture [[id number]]     Order Status: Sent Specimen: Skin             I have reviewed all pertinent labs within the past 24 hours.    Estimated Creatinine Clearance: 73.3 mL/min (based on SCr of 1 mg/dL).    Diagnostic Results:  I have reviewed and interpreted all pertinent imaging results/findings within the past 24 hours.  Assessment and Plan:     No notes on file    * Type 2 diabetes mellitus  Pt presenting with hyperglycemia of nearly 600. Pt dry on exam on AM eval, overnight initiated on diuresis with IVP, discontinued this morning.   D/w Endocrine, given downtrending glucose levels, will initiate scheduled insulin.   -po intake per SLP recommendations - diabetic diet ordered   -endocrine following, appreciate recs.     Acute osteomyelitis of toe, right  Likely source for septic shock on presentation.  -podiatry seeing pt,  s/p amputation 6/22.   -Continue IV zosyn, Needs 6 week course of IV zosyn for the associtated enterococcus bacteremia.  -cont holding diuretics     Bacteremia  -cont Zosyn for enterococcus/E coli bacteremia  -ID following, appreciate recs  -R toe osteomyelitis is most likely source   -Needs 6 week course of IV zosyn for the enterococcus bacteremia, concerns over the safety of sending home with PICC line and IV abx discussing if any PO alternatives    Shock  Pt initially presented with elevated lactic and elevated glucose, elevated serum osm but not quite to level of HHS. DKA workup negative. No pH for eval completed.  Septic shock with source most likely R foot.     -endocrine following, appreciate recs  -stop diuresis, cont to hold   -monitor renal function   -podiatry c/s, appreciate recs, OR 6/22 for partial amputation       Anticoagulated  -cont warfarin per INR   -pharmacy assisting with dosing     LVAD (left ventricular assist device) present  Procedure: Device Interrogation Including analysis of device parameters  Current Settings: Ventricular Assist Device  INR 1.6, will continue coumadin  INR therapeutic, Continue coumadin  Review of device function is stable/unstable stable        6/27/2025     7:57 AM 6/27/2025     4:50 AM 6/26/2025    11:39 PM 6/26/2025     8:07 PM 6/26/2025     4:30 PM 6/26/2025    11:56 AM 6/26/2025     7:42 AM   TXP LVAD INTERROGATIONS   Type HeartMate3 HeartMate3 HeartMate3 HeartMate3 HeartMate3 HeartMate3 HeartMate3   Flow 4.3 4.3 4.3 4.2 4.4 4.4 4.5   Speed 5200 5200 5250 5250 5200 5200 5200   PI 6.5 5.9 6.5 6.5 7 6.1 6.2   Power (Edwards) 3.6 3.6 3.6 3.7 3.7 3.6 3.6   LSL 4800 4800 4850 4850 4800 4800 4800   Low Flow Alarm no no no no no no no   Pulsatility Pulse Pulse Pulse Pulse Pulse Pulse Pulse             Yohan Nunn PA-C  Heart Transplant  [facility] - [facility] Step[facility]

## 2025-06-27 NOTE — PROGRESS NOTES
"Update/Discharge planning    Pt presents in room aaox4 with neutral affect. Caregivers not present at time of visit. SW informed pt that due to previous home health could not staff his needs, a new home health has been found, [facility]. Advised pt that Joey will return today to continue teaching. When asked who will transport pt home, pt states, "I am going to a facility." Reminded pt of the reasons he cannot go to a facility: LVAD, continuous infusion, that he does not meet criteria and that his insurance will not authorize because he does not meet criteria.     Pt then states his sister Judy will transport pt home. After that, pt did not wish to speak further.    Lacy from [facility], who is helping with referral, states she spoke to pt's spouse who was resistant to meeting with home health. JULIO C phoned spouse, Meme, ph [phone]. Spouse states she is waiting to hear from "a facility" who had called her to see if pt will be accepted. Spouse does not recall name of agency or of person who called. Meme states she will do some prepping at the pt's home for his return home. Spouse states ability to meet with home health on Monday.    JULIO C received email from Donnell Mathis of [facility] stating they cannot accept the pt. JULIO C phoned Donnell to ask who had referred pt to them. Donnell states  2000, yesterday evening.    JULIO C phoned [facility],  at  2000 ph [phone]. JULIO C explained the inappropriate behavior exhibited by their RN Teresa Garcia. Per [facility], Teresa is no longer a field RN with them, but works in the office. [facility] states she will speak with Teresa and states Teresa will no longer be contacting SW or pt and his family. [facility] also states they have a nurse in training who will be covering pt's area but is not ready for cases yet.    Per Kindra of [employer], pt has a copay of $494/week but may be able to get it lowered through the Medicare copay assistance " program. Kindra met with pt with spouse on the phone and spouse will contact the pt's Part D to discuss the program. Will follow up with spouse later.    SW consulted with HTS team and it is agreed pt will stay through the weekend.     Returned to pt's room to confirm pt will stay through the weekend. Pt voices understanding.    Pt coping adequately and denies further needs, questions, concerns at this time and none indicated. Providing ongoing psychosocial and counseling support, education, resources, assistance and discharge planning as indicated. Following and available.    Addendum: phoned [employer] ph [phone] to f/u on order sent for rolling walker. Order found and Fredis will call me back if something else is needed.

## 2025-06-27 NOTE — PROGRESS NOTES
[facility] Step[facility]  Podiatry  Progress Note    Patient Name: Rocco France  MRN: [id number]  Admission Date: 6/20/2025  Hospital Length of Stay: 7 days  Attending Physician: Mike Chauhan MD  Primary Care Provider: Jay Guardado DO     Subjective:     Interval History: POD 5 s/p partial right first ray amputation. NAEON. No distress at bedside, cooperative, alert. No pain or complaints at this this time. Surgical site stable. Dressing changed without incident.    Follow-up For: Procedure(s) (LRB):  AMPUTATION, TOE, THROUGH METATARSAL HEAD (Right)    Post-Operative Day: 5 Days Post-Op    Scheduled Meds:   amLODIPine  10 mg Oral Daily    atorvastatin  80 mg Oral Daily    insulin aspart U-100  6 Units Subcutaneous TIDWM    [START ON 6/28/2025] insulin glargine U-100  18 Units Subcutaneous Daily    magnesium citrate  296 mL Oral Once    mirtazapine  30 mg Oral QHS    piperacillin-tazobactam (Zosyn) IV (PEDS and ADULTS) (extended infusion is not appropriate)  4.5 g Intravenous Q8H    polyethylene glycol  17 g Oral Daily    spironolactone  25 mg Oral Daily    warfarin  2.5 mg Oral Every Tues, Thurs, Sat, Sun    warfarin  5 mg Oral Every Mon, Wed, Fri     Continuous Infusions:  PRN Meds:  Current Facility-Administered Medications:     acetaminophen, 650 mg, Oral, Q6H PRN    bisacodyL, 10 mg, Rectal, Daily PRN    chlorproMAZINE, 25 mg, Oral, TID PRN    dextrose 50%, 12.5 g, Intravenous, PRN    dextrose 50%, 25 g, Intravenous, PRN    fentaNYL, 25 mcg, Intravenous, Q5 Min PRN    glucagon (human recombinant), 1 mg, Intramuscular, PRN    glucose, 16 g, Oral, PRN    glucose, 24 g, Oral, PRN    insulin aspart U-100, 0-5 Units, Subcutaneous, QID (AC + HS) PRN    ondansetron, 4 mg, Intravenous, Daily PRN    Review of Systems  Objective:     Vital Signs (Most Recent):  Temp: 98.5 °F (36.9 °C) (06/27/25 0745)  Pulse: 83 (06/27/25 0757)  Resp: 18 (06/27/25 0745)  BP: (!) 84/0 (06/27/25 0745)  SpO2: 98 % (06/27/25  0900) Vital Signs (24h Range):  Temp:  [98.1 °F (36.7 °C)-98.9 °F (37.2 °C)] 98.5 °F (36.9 °C)  Pulse:  [74-93] 83  Resp:  [18] 18  SpO2:  [98 %-99 %] 98 %  BP: ()/(0-77) 84/0     Weight: 74.3 kg (163 lb 12.8 oz)  Body mass index is 21.03 kg/m².    Foot Exam    General  Orientation: alert and oriented to person, place, and time   Affect: appropriate       Right Foot/Ankle     Inspection and Palpation  Ecchymosis: none  Tenderness: none   Swelling: (surgical site)  Skin Exam: skin changes; no drainage, skin not intact, no maceration, no ulcer and no erythema     Neurovascular  Dorsalis pedis: 1+  Posterior tibial: 1+  Saphenous nerve sensation: diminished  Tibial nerve sensation: diminished  Superficial peroneal nerve sensation: diminished  Deep peroneal nerve sensation: diminished  Sural nerve sensation: diminished    Comments  S/p partial 1st ray amputation with sutures intact, no maceration, no gapping, no drainage, no erythema. Normal post operative edema. No signs of infection or dehiscence.             Clinical media:    Laboratory:  All pertinent labs reviewed within the last 24 hours.    Diagnostic Results:  I have reviewed all pertinent imaging results/findings within the past 24 hours..  Assessment/Plan:     Endocrine  Diabetic ulcer of right foot  69 year old male with diabetic ulcer to the right hallux. Marked leukocytosis and E coli bacteremia.     Assessment:  Ulceration to the right hallux with depth to bone.  XR negative for convincing bony destruction.  Now status post partial 1st ray amputation, 6/22/25.    Plan:   - Patient seen and evaluated bedside by Podiatry.  Dressing change, plan of care discussed.  - Right foot dressed with light betadine paint, xeroform overlying incision, gauze padding, kerlix, ace to secure  - WBAT heel emphasis short distances in Darco shoe.    - PT/OT for mobility, appreciate recommendations.   - Abx per ID, appreciate recommendations. Tracing pre-operative wound  cultures, intra-op clean margins.   - Podiatry will follow. Please reach out with any questions.     Future discharge recs:  - Patient to follow up in Podiatry Clinic outpatient, Podiatry will arrange.  - SNF or HH to apply bandaging as described, x3/week: xeroform overlying incision, gauze padding, kerlix, ace to secure  - Abx plan per ID  - WBAT heel emphasis short distances in Darco shoe.   - Patient to keep dressings clean dry and intact  - Patient explained the importance of daily foot checks        Jeffery Park MD  Podiatry  [facility] - [facility] Stepdown

## 2025-06-27 NOTE — PT/OT/SLP PROGRESS
"Physical Therapy Co-Treatment    OT present for cotreat due to pt's multiple medical comorbidities and functional/cognition deficits requiring two skilled therapists to appropriately progress pt's musculoskeletal strength, neuromuscular control, and endurance while taking into consideration medical acuity and pt safety.    Patient Name:  Rocco France   MRN:  [id number]    Recommendations:     Discharge Recommendations: Low Intensity Therapy  Discharge Equipment Recommendations: walker, rolling  Barriers to discharge: None    Assessment:     Rocco France is a 69 y.o. male admitted with a medical diagnosis of Type 2 diabetes mellitus.  He presents with the following impairments/functional limitations: weakness, impaired endurance, impaired self care skills, impaired functional mobility, gait instability, impaired balance     Pt receptive and tolerated PT co-treatment with OT well. Pt shows progress with therapy with increased in gait distance without pain in the R foot. Pt amb ~130 ft with RW, SBA, and chair follow.  Patient continues to demonstrate the need for low intensity therapy on a scheduled basis exhibited by decreased independence with self-care and functional mobility    Rehab Prognosis: Good; patient would benefit from acute skilled PT services to address these deficits and reach maximum level of function.    Recent Surgery: Procedure(s) (LRB):  AMPUTATION, TOE, THROUGH METATARSAL HEAD (Right) 5 Days Post-Op    Plan:     During this hospitalization, patient to be seen 4 x/week to address the identified rehab impairments via gait training, therapeutic activities, therapeutic exercises, neuromuscular re-education and progress toward the following goals:    Plan of Care Expires:  07/23/25    Subjective     Chief Complaint: none reported  Patient/Family Comments/goals: "I can go a little further"  Pain/Comfort:  Pain Rating 1: 0/10  Location - Side 1: Right  Location - Orientation 1: distal  Location 1: " foot  Pain Rating Post-Intervention 1: 0/10      Objective:     Communicated with RN prior to session.  Patient found HOB elevated with telemetry, LVAD upon PT entry to room.     General Precautions: Standard, LVAD, fall, aspiration  Orthopedic Precautions: RLE weight bearing as tolerated (in darco shoe)  Braces:  (R darco shoe)  Respiratory Status: Room air  Functional Mobility:    Bed Mobility:   Supine > Sit: stand by assistance    Transfers:   Sit <> Stand Transfer: contact guard assistance from EOB using rolling walker  2/2 pt keeping both hands on RW    Balance:   Sitting balance: GOOD-: Incosistently Maintains balance through MODERATE excursions of active trunk movement,     Standing balance:   FAIR: Maintains without assist but unable to take challenges  FAIR+: Needs CLOSE SUPERVISION during gait and is able to right self with minor LOB                 Gait:  Distance: ~130 ft with chair follow   Assistive Device: RW  Assistance Level: stand by assistance  Gait Assessment: Pt amb with decreased yayo, decreased step length using step to gait pattern. No LOB or c/o pain while amb  Pt amb with R darco shoe downed and using shower bag with shoulder strap to carry batteries and controller    Pt able to independently switch from wall power to battery power    AM-PAC 6 CLICK MOBILITY  Turning over in bed (including adjusting bedclothes, sheets and blankets)?: 4  Sitting down on and standing up from a chair with arms (e.g., wheelchair, bedside commode, etc.): 3  Moving from lying on back to sitting on the side of the bed?: 4  Moving to and from a bed to a chair (including a wheelchair)?: 4  Need to walk in hospital room?: 4  Climbing 3-5 steps with a railing?: 2  Basic Mobility Total Score: 21       Treatment & Education:  Pt educated on tip to reduce fall risk and safety with mobility and using call button for assistance from nursing staff with OOB mobility.  Pt educated on sitting up in chair 2-4 hours of the  day  Pt educated on amb 2-3x per day with assistance from staff and increased movement during hospital stay.  All questions answered within the scope of PT.  White board updated accordingly.    Patient left up in chair with all lines intact and call button in reach..    GOALS:   Multidisciplinary Problems       Physical Therapy Goals          Problem: Physical Therapy    Goal Priority Disciplines Outcome Interventions   Physical Therapy Goal     PT, PT/OT Progressing    Description: Goals to be met by: 25     Patient will increase functional independence with mobility by performin. Sit to stand transfer with Modified [place]  2. Bed to chair transfer with Stand by assistance using LRAD or no AD  3. Gait  x 150 feet with Stand by assistance using LRAD or no AD.   4. Ascend/descend 3 stair with no Handrails Minimum assistance using LRAD or no AD.                          DME Justifications:   Rocco's mobility limitation cannot be sufficiently resolved by the use of a cane. His functional mobility deficit can be sufficiently resolved with the use of a Rolling Walker. Patient's mobility limitation significantly impairs their ability to participate in one of more activities of daily living.  The use of a RW will significantly improve the patient's ability to participate in MRADLS and the patient will use it on regular basis in the home.    Time Tracking:     PT Received On: 25  PT Start Time: 1325     PT Stop Time: 1348  PT Total Time (min): 23 min     Billable Minutes: Gait Training 23    Treatment Type: Treatment  PT/PTA: PT     Number of PTA visits since last PT visit: 0     2025

## 2025-06-27 NOTE — PT/OT/SLP PROGRESS
Occupational Therapy   Co-Treatment  PT present for co- treatment due to pt's multiple medical comorbidities and functional/cognition deficits requiring two skilled therapists to appropriately progress pt's musculoskeletal strength, neuromuscular control, and pain/ activity tolerance while taking into consideration medical acuity and pt safety.    Name: Rocco France  MRN: [id number]  Admitting Diagnosis:  Type 2 diabetes mellitus  5 Days Post-Op    Recommendations:     Discharge Recommendations: Low Intensity Therapy  Discharge Equipment Recommendations:  walker, rolling  Barriers to discharge:  None    Assessment:     Rocco France is a 69 y.o. male with a medical diagnosis of Type 2 diabetes mellitus.  He presents with no c/o pain however tolerated fxl mob from at room level and into the hallway well. Performance deficits affecting function are weakness, impaired endurance, impaired self care skills, impaired functional mobility, impaired balance, decreased safety awareness.     Rehab Prognosis:  Good; patient would benefit from acute skilled OT services to address these deficits and reach maximum level of function.       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  Patient found HOB elevated with telemetry, LVAD upon OT entry to room.    General Precautions: Standard, aspiration, LVAD, fall    Orthopedic Precautions:RLE weight bearing as tolerated  Braces:  (R Federico Shoe)  Respiratory Status: Room air     Occupational Performance:     Bed Mobility:    Patient completed Supine to Sit with  stand by assistance     Functional Mobility/Transfers:  Patient completed Sit <> Stand Transfer with contact guard assistance  with  no assistive device   Functional Mobility: Pt demonstrated functional mobility training to simulate household to/ from bathroom with RW with SBA and no LOB and good visual search and navigation strategies.     Activities of Daily Living:  Lower Body Dressing: stand by assistance donning Darco Shoe on R foot  LVAD Mgmt: Ind transitioning from wall power to battery power. Pt reports he uses theshower bag, reports wearing a vest at home      [facility] 6 Click ADL: 21    Treatment & Education:  Pt educated on role of occupational therapy, POC, and safety during ADLs and functional mobility. Pt and OT discussed importance of safe, continued mobility to optimize daily living skills. Pt verbalized understanding. Pt given instruction to call for medical staff/nurse for assistance.       Patient left up in chair with all lines intact and call button in reach    GOALS:   Multidisciplinary Problems       Occupational Therapy Goals          Problem: Occupational Therapy    Goal Priority Disciplines Outcome Interventions   Occupational Therapy Goal     OT, PT/OT Progressing    Description: Goals to be met by: 7/5/25     Patient will increase functional independence with ADLs by performing:    LE Dressing with Modified [place].  Grooming while standing at sink with Modified [place].  Toileting from toilet with Modified [place] for hygiene and clothing management.   Supine to sit with Modified [place].  Toilet transfer to toilet with Modified [place].                         DME Justifications:  No DME recommended requiring DME justifications    Time Tracking:     OT Date of Treatment: 06/27/25  OT Start Time: 1325  OT Stop Time: 1348  OT Total Time (min): 23 min    Billable Minutes:Self Care/Home Management 10  Therapeutic Activity 13    OT/[place]: OT          6/27/2025

## 2025-06-27 NOTE — PROGRESS NOTES
"[facility]  Endocrinology  Progress Note    Admit Date: 2025     Reason for Consult: Management of T2DM, Hyperglycemia     Surgical Procedure and Date:  s/p right toe amputation 2025    Diabetes diagnosis year:     Home Diabetes Medications:  None currently     Lab Results   Component Value Date    HGBA1C 6.6 (H) 2023       How often checking glucose at home? 1x per week   BG readings on regimen: 120s  Hypoglycemia on the regimen?  N/A  Missed doses on regimen?  N/A     Diabetes Complications include:     Hyperglycemia    Complicating diabetes co morbidities:   HTN, CHF      HPI:   Patient is a 69 y.o. 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  He was admitted for syncope 2022 at which time he was found to have an evolving right cerebral acute subdural hematoma and acute basal cistern subarachnoid hemorrhage. Patient presented to outside hospital emergency room with generalized weakness.  Patient is a poor historian.  He reports falling 3 times at home and has had weakness over the past week.  EMS called, noted sugar greater than 500.  Upon arrival, oral temperature is 101.1° F. patient complaining of dysuria, inability to "hold his urine".  Patient states he saw a doctor today, unable to give a urine sample, but had his INR drawn which was greater than 4.5. Endocrinology consulted for management of T2DM.     Interval HPI:   No acute events overnight. Patient in room 3098/3098 A. Blood glucose stable. BG below goal on current insulin regimen (SSI, prandial, and basal insulin ). Steroid use- None .   5 Days Post-Op  Renal function- Normal   Vasopressors-  None     Diet Consistent Carbohydrate 2000 Calories (up to 75 gm per meal)     Eatin%  Nausea: No  Hypoglycemia and intervention: No  Fever: No  TPN and/or TF: No     BP (!) 90/0 (BP Location: Right arm, " "Patient Position: Lying)   Pulse 83   Temp 98.5 °F (36.9 °C) (Oral)   Resp 18   Ht 6' 2" (1.88 m)   Wt 74.3 kg (163 lb 12.8 oz)   SpO2 98%   BMI 21.03 kg/m²     Labs Reviewed and Include    Recent Labs   Lab 06/27/25  0427   GLU 82   CALCIUM 8.1*   ALBUMIN 2.1*   PROT 8.7*      K 3.9   CO2 23      BUN 14   CREATININE 1.0   ALKPHOS 201*   ALT 15   AST 30   BILITOT 0.9     Lab Results   Component Value Date    WBC 10.03 06/27/2025    HGB 8.6 (L) 06/27/2025    HCT 27.0 (L) 06/27/2025    MCV 90 06/27/2025     06/27/2025     No results for input(s): "TSH", "FREET4" in the last 168 hours.  Lab Results   Component Value Date    HGBA1C 12.4 (H) 06/20/2025       Nutritional status:   Body mass index is 21.03 kg/m².  Lab Results   Component Value Date    ALBUMIN 2.1 (L) 06/27/2025    ALBUMIN 2.0 (L) 06/25/2025    ALBUMIN 2.2 (L) 06/23/2025     Lab Results   Component Value Date    PREALBUMIN 6 (L) 06/27/2025    PREALBUMIN 5 (L) 06/25/2025    PREALBUMIN 5 (L) 06/23/2025       Estimated Creatinine Clearance: 73.3 mL/min (based on SCr of 1 mg/dL).    Accu-Checks  Recent Labs     06/25/25  0706 06/25/25  1153 06/25/25  1605 06/25/25  2047 06/26/25  0632 06/26/25  0757 06/26/25  1146 06/26/25  1624 06/26/25  2015 06/27/25  0635   POCTGLUCOSE 142* 149* 90 225* 137* 156* 159* 110 83 100       Current Medications and/or Treatments Impacting Glycemic Control  Immunotherapy:    Immunosuppressants       None          Steroids:   Hormones (From admission, onward)      None          Pressors:    Autonomic Drugs (From admission, onward)      None          Hyperglycemia/Diabetes Medications:   Antihyperglycemics (From admission, onward)      Start     Stop Route Frequency Ordered    06/28/25 0900  insulin glargine U-100 (Lantus) pen 18 Units         -- SubQ Daily 06/27/25 0908    06/27/25 1130  insulin aspart U-100 pen 6 Units         -- SubQ 3 times daily with meals 06/27/25 0908    06/20/25 1449  insulin aspart " U-100 pen 0-5 Units         -- SubQ Before meals & nightly PRN 06/20/25 2909            ASSESSMENT and PLAN    Cardiac/Vascular  Hyperlipidemia  May increase insulin resistance.         ID  Acute osteomyelitis of toe, right  S/p right toe amputation  Infection may elevate BG readings  On IV antibiotics  Managed per primary team  Avoid hypoglycemia        Endocrine  * Type 2 diabetes mellitus  BG goal 140-180  Type 2 DM. S/p R toe amputation on 6/22.     Plan:  Decrease Lantus 18 units once daily   Decrease Novolog 6 units TID with meals   Low Dose Correction Scale  BG monitoring ac/hs    ** Please call Endocrine for any BG related issues **      Lab Results   Component Value Date    HGBA1C 12.4 (H) 06/20/2025     Bedside nurse to instruct on insulin pen use and blood sugar monitor. Have patient administer own injections after education completed supervised by nurse.    Discharge plans:  Likely will d/c with MDI regimen given poorly controlled A1c. Please notify Endocrine prior to d/c for updated recs  Lantus 18 units once daily  Novolog 6 units TID with meals in addition to the following correction scale prn ac/hs  150 - 200 + 1 unit  201 - 250 + 2 units  251 - 300 + 3 units  301 - 350 + 4 units   > 350   + 5 units    Patient would benefit from Dexcom at discharge if insurance approved. Will need follow up in Endocrine clinic and outpatient DM education scheduled at time of discharge.         Douglas Kim PA-C  Endocrinology  [facility]

## 2025-06-27 NOTE — SUBJECTIVE & OBJECTIVE
Interval History: No events overnight. Cultures have procesed w/ E coli and enterococcus faecalis. Needs 6 week course of IV zosyn for the enterococcus bacteremia. Working on HH plan    Continuous Infusions:  Scheduled Meds:   amLODIPine  10 mg Oral Daily    atorvastatin  80 mg Oral Daily    insulin aspart U-100  6 Units Subcutaneous TIDWM    [START ON 6/28/2025] insulin glargine U-100  18 Units Subcutaneous Daily    magnesium citrate  296 mL Oral Once    mirtazapine  30 mg Oral QHS    piperacillin-tazobactam (Zosyn) IV (PEDS and ADULTS) (extended infusion is not appropriate)  4.5 g Intravenous Q8H    polyethylene glycol  17 g Oral Daily    spironolactone  25 mg Oral Daily    warfarin  2.5 mg Oral Every Tues, Thurs, Sat, Sun    warfarin  5 mg Oral Every Mon, Wed, Fri     PRN Meds:  Current Facility-Administered Medications:     acetaminophen, 650 mg, Oral, Q6H PRN    bisacodyL, 10 mg, Rectal, Daily PRN    chlorproMAZINE, 25 mg, Oral, TID PRN    dextrose 50%, 12.5 g, Intravenous, PRN    dextrose 50%, 25 g, Intravenous, PRN    fentaNYL, 25 mcg, Intravenous, Q5 Min PRN    glucagon (human recombinant), 1 mg, Intramuscular, PRN    glucose, 16 g, Oral, PRN    glucose, 24 g, Oral, PRN    insulin aspart U-100, 0-5 Units, Subcutaneous, QID (AC + HS) PRN    ondansetron, 4 mg, Intravenous, Daily PRN    Review of patient's allergies indicates:   Allergen Reactions    Pcn [penicillins] Hives     Objective:     Vital Signs (Most Recent):  Temp: 98.5 °F (36.9 °C) (06/27/25 0745)  Pulse: 79 (06/27/25 1130)  Resp: 18 (06/27/25 0745)  BP: (!) 84/0 (06/27/25 0745)  SpO2: 98 % (06/27/25 0900) Vital Signs (24h Range):  Temp:  [98.1 °F (36.7 °C)-98.9 °F (37.2 °C)] 98.5 °F (36.9 °C)  Pulse:  [74-93] 79  Resp:  [18] 18  SpO2:  [98 %-99 %] 98 %  BP: ()/(0-77) 84/0     Patient Vitals for the past 72 hrs (Last 3 readings):   Weight   06/27/25 0745 74.3 kg (163 lb 12.8 oz)   06/27/25 0700 74.4 kg (164 lb 0.4 oz)   06/26/25 0615 72.8 kg  (160 lb 7.9 oz)     Body mass index is 21.03 kg/m².      Intake/Output Summary (Last 24 hours) at 6/27/2025 1145  Last data filed at 6/26/2025 2007  Gross per 24 hour   Intake 662 ml   Output 200 ml   Net 462 ml       Hemodynamic Parameters:       Telemetry: reviewed       Physical Exam  Vitals reviewed.   Constitutional:       Appearance: He is normal weight.   HENT:      Head: Normocephalic and atraumatic.      Right Ear: External ear normal.      Left Ear: External ear normal.      Nose: Nose normal.      Mouth/Throat:      Mouth: Mucous membranes are moist.      Pharynx: Oropharynx is clear.   Eyes:      Conjunctiva/sclera: Conjunctivae normal.   Cardiovascular:      Rate and Rhythm: Normal rate and regular rhythm.      Pulses: Normal pulses.   Pulmonary:      Effort: Pulmonary effort is normal.      Breath sounds: Normal breath sounds.   Abdominal:      General: Abdomen is flat. Bowel sounds are normal.      Palpations: Abdomen is soft.   Musculoskeletal:      Cervical back: Normal range of motion.      Right lower leg: No edema.      Left lower leg: No edema.   Skin:     General: Skin is warm.      Capillary Refill: Capillary refill takes less than 2 seconds.   Neurological:      Mental Status: He is alert. Mental status is at baseline.   Psychiatric:         Mood and Affect: Mood normal.         Behavior: Behavior normal.            Significant Labs:  CBC:  Recent Labs   Lab 06/25/25  0246 06/26/25 0324 06/27/25 0427   WBC 12.91* 11.12 10.03   RBC 3.12* 3.24* 2.99*   HGB 9.0* 9.2* 8.6*   HCT 28.3* 29.3* 27.0*    238 243   MCV 91 90 90   MCH 28.8 28.4 28.8   MCHC 31.8* 31.4* 31.9*     BNP:  Recent Labs   Lab 06/23/25  0646 06/25/25  0246 06/27/25 0427   * 424* 542*     CMP:  Recent Labs   Lab 06/23/25  0646 06/23/25  0851 06/25/25  0246 06/26/25 0324 06/27/25 0427   GLU  --    < > 125* 130* 82   CALCIUM  --    < > 8.5* 8.3* 8.1*   ALBUMIN 2.2*  --  2.0*  --  2.1*   PROT 9.0*  --  8.5*  --   8.7*   NA  --    < > 133* 135* 136   K  --    < > 4.2 4.1 3.9   CO2  --    < > 24 24 23   CL  --    < > 100 104 105   BUN  --    < > 22 19 14   CREATININE  --    < > 1.2 1.2 1.0   ALKPHOS 207*  --  257*  --  201*   ALT 15  --  21  --  15   AST 28  --  47*  --  30   BILITOT 1.1*  --  0.9  --  0.9    < > = values in this interval not displayed.      Coagulation:   Recent Labs   Lab 06/25/25  0246 06/26/25  0324 06/27/25  0427   INR 1.6* 1.5* 1.6*   APTT 35.6* 33.9* 32.5*     LDH:  Recent Labs   Lab 06/25/25  0246 06/26/25  0324 06/27/25  0427    217 207     Microbiology:  Microbiology Results (last 7 days)       Procedure Component Value Units Date/Time    Blood culture [[id number]]  (Normal) Collected: 06/23/25 0646    Order Status: Completed Specimen: Blood from Peripheral, Antecubital, Right Updated: 06/27/25 0901     Blood Culture No Growth After 96 hours    Culture, Anaerobic [[id number]] Collected: 06/22/25 1450    Order Status: Completed Specimen: Bone from Toe, Right Foot Updated: 06/26/25 1056     Anaerobe Culture No Anaerobes Isolated    Aerobic culture [[id number]]  (Abnormal)  (Susceptibility) Collected: 06/22/25 1450    Order Status: Completed Specimen: Bone from Toe, Right Foot Updated: 06/25/25 1235     CULTURE, AEROBIC Few Enterococcus faecalis    Afb Culture Stain [[id number]] Collected: 06/22/25 1450    Order Status: Completed Specimen: Bone from Toe, Right Foot Updated: 06/23/25 1518     ACID FAST STAIN  No acid fast bacilli seen    Fungus culture [[id number]]  (Normal) Collected: 06/22/25 1450    Order Status: Completed Specimen: Bone from Toe, Right Foot Updated: 06/23/25 1009     Fungal Culture Culture In Progress    Gram stain [[id number]] Collected: 06/22/25 1450    Order Status: Completed Specimen: Bone from Toe, Right Foot Updated: 06/22/25 1857     GRAM STAIN No WBCs, epithelial cells or organisms seen    AFB Culture & Smear [[id number]] Collected: 06/22/25 1450    Order Status:  Resulted Specimen: Bone from Toe, Right Foot Updated: 06/22/25 1640    Culture, Anaerobe [[id number]]     Order Status: Sent Specimen: Skin     Aerobic culture [[id number]]     Order Status: Sent Specimen: Skin             I have reviewed all pertinent labs within the past 24 hours.    Estimated Creatinine Clearance: 73.3 mL/min (based on SCr of 1 mg/dL).    Diagnostic Results:  I have reviewed and interpreted all pertinent imaging results/findings within the past 24 hours.

## 2025-06-27 NOTE — PROGRESS NOTES
Rounded on patient; he is confused regarding HH, rehab and IV antibx. His HH nurse Teresa has told him that she found a rehab place that will take him while on IV antibx because HH cannot be held liable for him if he gets an air bubble in his PICC line and dies from it. Patient is also worried that he is going to have to change his insurance if he uses a different HH. I sat with patient for a bit trying to explain that rehab has to be educated on the LVAD before he can go there, he does not need to change his insurance for another HH, and that he is fully competent to infuse the antibiotics as he demonstrated so to the infusion company.     While in the room, patient received a message from HH nurse Teresa and asked me to read it. Teresa implicated that his discharge planner, JULIO C Muhammad, called the agency behind her back, and they will no longer accept him at rehab or the HH. I called Teresa and spoke with her regarding the implications and explained regarding LVADs and rehab and that our SW is finding him a new HH who will accept him with IV antibx.

## 2025-06-27 NOTE — PLAN OF CARE
Pt maintained free from falls/trauma/injuries and skin breakdown. Pt denied pain or discomfort. Plan of care reviewed. Pt verbalized understanding. All questions and concerns addressed. VSS. NAD.    Problem: Adult Inpatient Plan of Care  Goal: Plan of Care Review  6/27/2025 0344 by Hilda Bonilla RN  Outcome: Progressing  6/27/2025 0340 by Hilda Bonilla RN  Outcome: Progressing  Goal: Patient-Specific Goal (Individualized)  6/27/2025 0344 by Hilda Bonilla RN  Outcome: Progressing  6/27/2025 0340 by Hilda Bonilla RN  Outcome: Progressing  Goal: Absence of Hospital-Acquired Illness or Injury  6/27/2025 0344 by Hilda Bonilla RN  Outcome: Progressing  6/27/2025 0340 by Hilda Bonilla RN  Outcome: Progressing  Goal: Optimal Comfort and Wellbeing  6/27/2025 0344 by Hilda Bonilla RN  Outcome: Progressing  6/27/2025 0340 by Hilda Bonilla RN  Outcome: Progressing  Goal: Readiness for Transition of Care  6/27/2025 0344 by Hilda Bonilla RN  Outcome: Progressing  6/27/2025 0340 by Hilda Bonilla RN  Outcome: Progressing     Problem: Diabetes Comorbidity  Goal: Blood Glucose Level Within Targeted Range  6/27/2025 0344 by Hilda Bonilla RN  Outcome: Progressing  6/27/2025 0340 by Hilda Bonilla RN  Outcome: Progressing     Problem: Sepsis/Septic Shock  Goal: Optimal Coping  6/27/2025 0344 by Hilda Bonilla RN  Outcome: Progressing  6/27/2025 0340 by Hilda Bonilla RN  Outcome: Progressing  Goal: Absence of Bleeding  6/27/2025 0344 by Hilda Bonilla RN  Outcome: Progressing  6/27/2025 0340 by Hilda Bonilla RN  Outcome: Progressing  Goal: Blood Glucose Level Within Targeted Range  6/27/2025 0344 by Hilda Bonilla RN  Outcome: Progressing  6/27/2025 0340 by Hilda Bonilla RN  Outcome: Progressing  Goal: Absence of Infection Signs and Symptoms  6/27/2025 0344 by Hilda Bonilla RN  Outcome: Progressing  6/27/2025 0340 by Hilda Bonilla RN  Outcome: Progressing  Goal: Optimal Nutrition  Intake  6/27/2025 0344 by Hilda Bonilla RN  Outcome: Progressing  6/27/2025 0340 by Hilda Bonilla RN  Outcome: Progressing     Problem: Ventricular Assist Device  Goal: Optimal Adjustment to Device  6/27/2025 0344 by Hilda Bonilla RN  Outcome: Progressing  6/27/2025 0340 by Hilda Bonilla RN  Outcome: Progressing  Goal: Absence of Bleeding  6/27/2025 0344 by Hlida Bonilla RN  Outcome: Progressing  6/27/2025 0340 by Hilda Bonilla RN  Outcome: Progressing  Goal: Absence of Embolism Signs and Symptoms  6/27/2025 0344 by Hilda Bonilla RN  Outcome: Progressing  6/27/2025 0340 by Hilda Bonilla RN  Outcome: Progressing  Goal: Optimal Blood Flow  6/27/2025 0344 by Hilda Bonilla RN  Outcome: Progressing  6/27/2025 0340 by Hilda Bonilla RN  Outcome: Progressing  Goal: Absence of Infection Signs and Symptoms  6/27/2025 0344 by Hilda Bonilla RN  Outcome: Progressing  6/27/2025 0340 by Hilda Bonilla RN  Outcome: Progressing  Goal: Effective Right-Sided Heart Function  6/27/2025 0344 by Hilda Bonilla RN  Outcome: Progressing  6/27/2025 0340 by Hilda Bonilla RN  Outcome: Progressing     Problem: Skin Injury Risk Increased  Goal: Skin Health and Integrity  6/27/2025 0344 by Hilda Bonilla RN  Outcome: Progressing  6/27/2025 0340 by Hilda Bonilla RN  Outcome: Progressing     Problem: Wound  Goal: Optimal Coping  6/27/2025 0344 by Hilda Bonilla RN  Outcome: Progressing  6/27/2025 0340 by Hilda Bonilla RN  Outcome: Progressing  Goal: Optimal Functional Ability  6/27/2025 0344 by Hilda Bonilla RN  Outcome: Progressing  6/27/2025 0340 by Hilda Bonilla RN  Outcome: Progressing  Goal: Absence of Infection Signs and Symptoms  6/27/2025 0344 by Hilda Bonilla RN  Outcome: Progressing  6/27/2025 0340 by Hilda Bonilla RN  Outcome: Progressing  Goal: Improved Oral Intake  6/27/2025 0344 by Hilda Bonilla RN  Outcome: Progressing  6/27/2025 0340 by Hilda Bonilla, RN  Outcome:  Progressing  Goal: Optimal Pain Control and Function  6/27/2025 0344 by Hilda Bonilla RN  Outcome: Progressing  6/27/2025 0340 by Hilda Bonilla RN  Outcome: Progressing  Goal: Skin Health and Integrity  6/27/2025 0344 by Hilda Bonilla RN  Outcome: Progressing  6/27/2025 0340 by Hilda Bonilla RN  Outcome: Progressing  Goal: Optimal Wound Healing  6/27/2025 0344 by Hilda Bonilla RN  Outcome: Progressing  6/27/2025 0340 by Hilda Bonilla RN  Outcome: Progressing     Problem: Fall Injury Risk  Goal: Absence of Fall and Fall-Related Injury  6/27/2025 0344 by Hilda Bonilla RN  Outcome: Progressing  6/27/2025 0340 by Hilda Bonilla RN  Outcome: Progressing

## 2025-06-28 LAB
ABSOLUTE EOSINOPHIL (OHS): 0.23 K/UL
ABSOLUTE MONOCYTE (OHS): 0.9 K/UL (ref 0.3–1)
ABSOLUTE NEUTROPHIL COUNT (OHS): 5.64 K/UL (ref 1.8–7.7)
ANION GAP (OHS): 10 MMOL/L (ref 8–16)
APTT PPP: 34.7 SECONDS (ref 21–32)
BACTERIA BLD CULT: NORMAL
BASOPHILS # BLD AUTO: 0.07 K/UL
BASOPHILS NFR BLD AUTO: 0.8 %
BUN SERPL-MCNC: 13 MG/DL (ref 8–23)
CALCIUM SERPL-MCNC: 8.9 MG/DL (ref 8.7–10.5)
CHLORIDE SERPL-SCNC: 107 MMOL/L (ref 95–110)
CO2 SERPL-SCNC: 21 MMOL/L (ref 23–29)
CREAT SERPL-MCNC: 1 MG/DL (ref 0.5–1.4)
ERYTHROCYTE [DISTWIDTH] IN BLOOD BY AUTOMATED COUNT: 14.7 % (ref 11.5–14.5)
GFR SERPLBLD CREATININE-BSD FMLA CKD-EPI: >60 ML/MIN/1.73/M2
GLUCOSE SERPL-MCNC: 77 MG/DL (ref 70–110)
HCT VFR BLD AUTO: 30.9 % (ref 40–54)
HGB BLD-MCNC: 9.5 GM/DL (ref 14–18)
IMM GRANULOCYTES # BLD AUTO: 0.05 K/UL (ref 0–0.04)
IMM GRANULOCYTES NFR BLD AUTO: 0.5 % (ref 0–0.5)
INR PPP: 1.7 (ref 0.8–1.2)
LDH SERPL-CCNC: 221 U/L (ref 110–260)
LYMPHOCYTES # BLD AUTO: 2.32 K/UL (ref 1–4.8)
MAGNESIUM SERPL-MCNC: 2 MG/DL (ref 1.6–2.6)
MCH RBC QN AUTO: 28.3 PG (ref 27–31)
MCHC RBC AUTO-ENTMCNC: 30.7 G/DL (ref 32–36)
MCV RBC AUTO: 92 FL (ref 82–98)
NUCLEATED RBC (/100WBC) (OHS): 0 /100 WBC
PHOSPHATE SERPL-MCNC: 2.3 MG/DL (ref 2.7–4.5)
PLATELET # BLD AUTO: 245 K/UL (ref 150–450)
PMV BLD AUTO: 11 FL (ref 9.2–12.9)
POCT GLUCOSE: 162 MG/DL (ref 70–110)
POCT GLUCOSE: 185 MG/DL (ref 70–110)
POCT GLUCOSE: 203 MG/DL (ref 70–110)
POCT GLUCOSE: 91 MG/DL (ref 70–110)
POTASSIUM SERPL-SCNC: 4.4 MMOL/L (ref 3.5–5.1)
PROTHROMBIN TIME: 18.1 SECONDS (ref 9–12.5)
RBC # BLD AUTO: 3.36 M/UL (ref 4.6–6.2)
RELATIVE EOSINOPHIL (OHS): 2.5 %
RELATIVE LYMPHOCYTE (OHS): 25.2 % (ref 18–48)
RELATIVE MONOCYTE (OHS): 9.8 % (ref 4–15)
RELATIVE NEUTROPHIL (OHS): 61.2 % (ref 38–73)
SODIUM SERPL-SCNC: 138 MMOL/L (ref 136–145)
WBC # BLD AUTO: 9.21 K/UL (ref 3.9–12.7)

## 2025-06-28 PROCEDURE — 83735 ASSAY OF MAGNESIUM: CPT | Performed by: STUDENT IN AN ORGANIZED HEALTH CARE EDUCATION/TRAINING PROGRAM

## 2025-06-28 PROCEDURE — 25000003 PHARM REV CODE 250: Performed by: INTERNAL MEDICINE

## 2025-06-28 PROCEDURE — 94761 N-INVAS EAR/PLS OXIMETRY MLT: CPT

## 2025-06-28 PROCEDURE — 63600175 PHARM REV CODE 636 W HCPCS: Performed by: INTERNAL MEDICINE

## 2025-06-28 PROCEDURE — 85025 COMPLETE CBC W/AUTO DIFF WBC: CPT | Performed by: BEHAVIOR TECHNICIAN

## 2025-06-28 PROCEDURE — 36415 COLL VENOUS BLD VENIPUNCTURE: CPT | Performed by: BEHAVIOR TECHNICIAN

## 2025-06-28 PROCEDURE — 84100 ASSAY OF PHOSPHORUS: CPT | Performed by: STUDENT IN AN ORGANIZED HEALTH CARE EDUCATION/TRAINING PROGRAM

## 2025-06-28 PROCEDURE — 93750 INTERROGATION VAD IN PERSON: CPT | Mod: ,,, | Performed by: INTERNAL MEDICINE

## 2025-06-28 PROCEDURE — 25000003 PHARM REV CODE 250: Performed by: STUDENT IN AN ORGANIZED HEALTH CARE EDUCATION/TRAINING PROGRAM

## 2025-06-28 PROCEDURE — 83615 LACTATE (LD) (LDH) ENZYME: CPT | Performed by: STUDENT IN AN ORGANIZED HEALTH CARE EDUCATION/TRAINING PROGRAM

## 2025-06-28 PROCEDURE — 85610 PROTHROMBIN TIME: CPT | Performed by: STUDENT IN AN ORGANIZED HEALTH CARE EDUCATION/TRAINING PROGRAM

## 2025-06-28 PROCEDURE — 20600001 HC STEP DOWN PRIVATE ROOM

## 2025-06-28 PROCEDURE — 25000003 PHARM REV CODE 250: Performed by: BEHAVIOR TECHNICIAN

## 2025-06-28 PROCEDURE — 99233 SBSQ HOSP IP/OBS HIGH 50: CPT | Mod: ,,, | Performed by: INTERNAL MEDICINE

## 2025-06-28 PROCEDURE — 99232 SBSQ HOSP IP/OBS MODERATE 35: CPT | Mod: ,,, | Performed by: PHYSICIAN ASSISTANT

## 2025-06-28 PROCEDURE — 85730 THROMBOPLASTIN TIME PARTIAL: CPT | Performed by: STUDENT IN AN ORGANIZED HEALTH CARE EDUCATION/TRAINING PROGRAM

## 2025-06-28 PROCEDURE — 27000248 HC VAD-ADDITIONAL DAY

## 2025-06-28 PROCEDURE — 80048 BASIC METABOLIC PNL TOTAL CA: CPT | Performed by: BEHAVIOR TECHNICIAN

## 2025-06-28 RX ORDER — INSULIN ASPART 100 [IU]/ML
3 INJECTION, SOLUTION INTRAVENOUS; SUBCUTANEOUS
Status: DISCONTINUED | OUTPATIENT
Start: 2025-06-28 | End: 2025-06-30 | Stop reason: HOSPADM

## 2025-06-28 RX ORDER — INSULIN GLARGINE 100 [IU]/ML
11 INJECTION, SOLUTION SUBCUTANEOUS DAILY
Status: DISCONTINUED | OUTPATIENT
Start: 2025-06-28 | End: 2025-06-30 | Stop reason: HOSPADM

## 2025-06-28 RX ADMIN — PIPERACILLIN SODIUM AND TAZOBACTAM SODIUM 4.5 G: 4; .5 INJECTION, POWDER, FOR SOLUTION INTRAVENOUS at 09:06

## 2025-06-28 RX ADMIN — ATORVASTATIN CALCIUM 80 MG: 40 TABLET, FILM COATED ORAL at 08:06

## 2025-06-28 RX ADMIN — PIPERACILLIN SODIUM AND TAZOBACTAM SODIUM 4.5 G: 4; .5 INJECTION, POWDER, FOR SOLUTION INTRAVENOUS at 03:06

## 2025-06-28 RX ADMIN — INSULIN ASPART 3 UNITS: 100 INJECTION, SOLUTION INTRAVENOUS; SUBCUTANEOUS at 04:06

## 2025-06-28 RX ADMIN — INSULIN ASPART 3 UNITS: 100 INJECTION, SOLUTION INTRAVENOUS; SUBCUTANEOUS at 11:06

## 2025-06-28 RX ADMIN — INSULIN GLARGINE 11 UNITS: 100 INJECTION, SOLUTION SUBCUTANEOUS at 08:06

## 2025-06-28 RX ADMIN — SPIRONOLACTONE 25 MG: 25 TABLET, FILM COATED ORAL at 08:06

## 2025-06-28 RX ADMIN — AMLODIPINE BESYLATE 10 MG: 10 TABLET ORAL at 08:06

## 2025-06-28 RX ADMIN — POLYETHYLENE GLYCOL 3350 17 G: 17 POWDER, FOR SOLUTION ORAL at 08:06

## 2025-06-28 RX ADMIN — PIPERACILLIN SODIUM AND TAZOBACTAM SODIUM 4.5 G: 4; .5 INJECTION, POWDER, FOR SOLUTION INTRAVENOUS at 11:06

## 2025-06-28 RX ADMIN — BISACODYL 10 MG: 10 SUPPOSITORY RECTAL at 05:06

## 2025-06-28 RX ADMIN — INSULIN ASPART 2 UNITS: 100 INJECTION, SOLUTION INTRAVENOUS; SUBCUTANEOUS at 11:06

## 2025-06-28 RX ADMIN — MIRTAZAPINE 30 MG: 30 TABLET, FILM COATED ORAL at 09:06

## 2025-06-28 RX ADMIN — WARFARIN SODIUM 2.5 MG: 2.5 TABLET ORAL at 04:06

## 2025-06-28 NOTE — ASSESSMENT & PLAN NOTE
BG goal 140-180  Type 2 DM. S/p R toe amputation on 6/22.  Blood sugars much improved and below goal.  Decrease regimen    Plan:  Decrease Lantus 11 units once daily   Decrease Novolog 3 units TID with meals   Low Dose Correction Scale  BG monitoring ac/hs    ** Please call Endocrine for any BG related issues **      Lab Results   Component Value Date    HGBA1C 12.4 (H) 06/20/2025     Bedside nurse to instruct on insulin pen use and blood sugar monitor. Have patient administer own injections after education completed supervised by nurse.    Discharge plans:  Likely will d/c with MDI regimen given poorly controlled A1c. Please notify Endocrine prior to d/c for updated recs    Patient would benefit from Dexcom at discharge if insurance approved. Will need follow up in Endocrine clinic and outpatient DM education scheduled at time of discharge.

## 2025-06-28 NOTE — PLAN OF CARE
Problem: Adult Inpatient Plan of Care  Goal: Plan of Care Review  Outcome: Progressing  Goal: Patient-Specific Goal (Individualized)  Outcome: Progressing  Goal: Absence of Hospital-Acquired Illness or Injury  Outcome: Progressing  Goal: Optimal Comfort and Wellbeing  Outcome: Progressing  Goal: Readiness for Transition of Care  Outcome: Progressing     Problem: Diabetes Comorbidity  Goal: Blood Glucose Level Within Targeted Range  Outcome: Progressing     Problem: Sepsis/Septic Shock  Goal: Optimal Coping  Outcome: Progressing  Goal: Absence of Bleeding  Outcome: Progressing  Goal: Blood Glucose Level Within Targeted Range  Outcome: Progressing  Goal: Absence of Infection Signs and Symptoms  Outcome: Progressing  Goal: Optimal Nutrition Intake  Outcome: Progressing     Problem: Ventricular Assist Device  Goal: Optimal Adjustment to Device  Outcome: Progressing  Goal: Absence of Bleeding  Outcome: Progressing  Goal: Absence of Embolism Signs and Symptoms  Outcome: Progressing  Goal: Optimal Blood Flow  Outcome: Progressing  Goal: Absence of Infection Signs and Symptoms  Outcome: Progressing  Goal: Effective Right-Sided Heart Function  Outcome: Progressing     Problem: Skin Injury Risk Increased  Goal: Skin Health and Integrity  Outcome: Progressing     Problem: Wound  Goal: Optimal Coping  Outcome: Progressing  Goal: Optimal Functional Ability  Outcome: Progressing  Goal: Absence of Infection Signs and Symptoms  Outcome: Progressing  Goal: Improved Oral Intake  Outcome: Progressing  Goal: Optimal Pain Control and Function  Outcome: Progressing  Goal: Skin Health and Integrity  Outcome: Progressing  Goal: Optimal Wound Healing  Outcome: Progressing   Pt educated on fall risk and remained free from falls/trauma/injury. Denies chest pain, SOB, palpitations, dizziness, pain, or discomfort. Plan of care reviewed with pt, all questions answered. Bed locked in lowest position, call bell within reach, no acute distress  noted, will continue to monitor. VSS, afebrile.

## 2025-06-28 NOTE — ASSESSMENT & PLAN NOTE
-cont Zosyn for enterococcus/E coli bacteremia  -ID following, appreciate recs  -R toe osteomyelitis is most likely source   -Needs 6 week course of IV zosyn for the enterococcus bacteremia

## 2025-06-28 NOTE — PROGRESS NOTES
06/28/2025  Carly Mando    Current provider:  Mike Chauhan MD    Device interrogation:      6/28/2025    12:00 PM 6/28/2025    10:03 AM 6/28/2025     6:12 AM 6/28/2025    12:01 AM 6/27/2025     4:15 PM 6/27/2025    12:00 PM 6/27/2025     7:57 AM   TXP LVAD INTERROGATIONS   Type HeartMate3 HeartMate3 HeartMate3 HeartMate3 HeartMate3 HeartMate3 HeartMate3   Flow 4.5  5200 4.6 4.3 4.7 4.3   Speed 5200 5200 5400 5200 5200 5200 5200   PI 5.5 4.8 6.1 6.1 6.6 5.5 6.5   Power (Edwards) 3.6 3.6 3.8 3.8 3.8 3.8 3.6   LSL 4800 4800 4800 4800 4800 4800 4900   Low Flow Alarm no NO   no no no   Pulsatility Pulse Pulse Pulse Pulse Pulse Pulse Pulse          Rounded on Rocco France to ensure all mechanical assist device settings (IABP or VAD) were appropriate and all parameters were within limits.  I was able to ensure all back up equipment was present, the staff had no issues, and the Perfusion Department daily rounding was complete.      For implantable VADs: Interrogation of Ventricular assist device was performed with analysis of device parameters and review of device function. I have personally reviewed the interrogation findings and agree with findings as stated.     In emergency, the nursing units have been notified to contact the perfusion department either by:  Calling [phone] from 630am to 4pm Mon thru Fri, utilizing the On-Call Finder functionality of Epic and searching for Perfusion, or by contacting the hospital  from 4pm to 630am and on weekends and asking to speak with the perfusionist on call.    3:43 PM

## 2025-06-28 NOTE — PROGRESS NOTES
[facility] - Cardiology Stepdown  Heart Transplant  Progress Note    Patient Name: Rocco France  MRN: [id number]  Admission Date: 6/20/2025  Hospital Length of Stay: 8 days  Attending Physician: Mike Chauhan MD  Primary Care Provider: Jay Guardado DO  Principal Problem:Type 2 diabetes mellitus    Subjective:   Interval History: NAEO. Patient has no new complaints. He feels better. He is tolerating antibiotics. He needs long term IV access for 6 weeks of antibiotics once home health is arranged.    Continuous Infusions:  Scheduled Meds:   amLODIPine  10 mg Oral Daily    atorvastatin  80 mg Oral Daily    insulin aspart U-100  3 Units Subcutaneous TIDWM    insulin glargine U-100  11 Units Subcutaneous Daily    magnesium citrate  296 mL Oral Once    mirtazapine  30 mg Oral QHS    piperacillin-tazobactam (Zosyn) IV (PEDS and ADULTS) (extended infusion is not appropriate)  4.5 g Intravenous Q8H    polyethylene glycol  17 g Oral Daily    spironolactone  25 mg Oral Daily    warfarin  2.5 mg Oral Every Tues, Thurs, Sat, Sun    warfarin  5 mg Oral Every Mon, Wed, Fri     PRN Meds:  Current Facility-Administered Medications:     acetaminophen, 650 mg, Oral, Q6H PRN    bisacodyL, 10 mg, Rectal, Daily PRN    chlorproMAZINE, 25 mg, Oral, TID PRN    dextrose 50%, 12.5 g, Intravenous, PRN    dextrose 50%, 25 g, Intravenous, PRN    fentaNYL, 25 mcg, Intravenous, Q5 Min PRN    glucagon (human recombinant), 1 mg, Intramuscular, PRN    glucose, 16 g, Oral, PRN    glucose, 24 g, Oral, PRN    insulin aspart U-100, 0-5 Units, Subcutaneous, QID (AC + HS) PRN    ondansetron, 4 mg, Intravenous, Daily PRN    Review of patient's allergies indicates:   Allergen Reactions    Pcn [penicillins] Hives     Objective:     Vital Signs (Most Recent):  Temp: 97.5 °F (36.4 °C) (06/28/25 0800)  Pulse: 78 (06/28/25 0958)  Resp: 18 (06/28/25 0800)  BP: (!) 80/0 (doppler) (06/28/25 0800)  SpO2: 97 % (06/28/25 0800) Vital Signs (24h Range):  Temp:   [97.5 °F (36.4 °C)-99.5 °F (37.5 °C)] 97.5 °F (36.4 °C)  Pulse:  [29-98] 78  Resp:  [18] 18  SpO2:  [95 %-99 %] 97 %  BP: ()/(0-74) 80/0     Patient Vitals for the past 72 hrs (Last 3 readings):   Weight   06/27/25 0745 74.3 kg (163 lb 12.8 oz)   06/27/25 0700 74.4 kg (164 lb 0.4 oz)   06/26/25 0615 72.8 kg (160 lb 7.9 oz)     Body mass index is 21.03 kg/m².      Intake/Output Summary (Last 24 hours) at 6/28/2025 1033  Last data filed at 6/28/2025 1007  Gross per 24 hour   Intake 1024 ml   Output 650 ml   Net 374 ml            Physical Exam  Vitals reviewed.   Constitutional:       Appearance: He is normal weight.   HENT:      Head: Normocephalic and atraumatic.      Right Ear: External ear normal.      Left Ear: External ear normal.      Nose: Nose normal.      Mouth/Throat:      Mouth: Mucous membranes are moist.      Pharynx: Oropharynx is clear.   Eyes:      Conjunctiva/sclera: Conjunctivae normal.   Cardiovascular:      Rate and Rhythm: Normal rate and regular rhythm.      Pulses: Normal pulses.   Pulmonary:      Effort: Pulmonary effort is normal.      Breath sounds: Normal breath sounds.   Abdominal:      General: Abdomen is flat. Bowel sounds are normal.      Palpations: Abdomen is soft.   Musculoskeletal:      Cervical back: Normal range of motion.      Right lower leg: No edema.      Left lower leg: No edema.   Skin:     General: Skin is warm.      Capillary Refill: Capillary refill takes less than 2 seconds.   Neurological:      Mental Status: He is alert. Mental status is at baseline.   Psychiatric:         Mood and Affect: Mood normal.         Behavior: Behavior normal.            Significant Labs:  CBC:  Recent Labs   Lab 06/26/25  0324 06/27/25  0427 06/28/25  0413   WBC 11.12 10.03 9.21   RBC 3.24* 2.99* 3.36*   HGB 9.2* 8.6* 9.5*   HCT 29.3* 27.0* 30.9*    243 245   MCV 90 90 92   MCH 28.4 28.8 28.3   MCHC 31.4* 31.9* 30.7*     BNP:  Recent Labs   Lab 06/23/25  0646 06/25/25  0241  06/27/25 0427   * 424* 542*     CMP:  Recent Labs   Lab 06/23/25  0646 06/23/25  0851 06/25/25  0246 06/26/25  0324 06/27/25  0427 06/28/25  0413   GLU  --    < > 125* 130* 82 77   CALCIUM  --    < > 8.5* 8.3* 8.1* 8.9   ALBUMIN 2.2*  --  2.0*  --  2.1*  --    PROT 9.0*  --  8.5*  --  8.7*  --    NA  --    < > 133* 135* 136 138   K  --    < > 4.2 4.1 3.9 4.4   CO2  --    < > 24 24 23 21*   CL  --    < > 100 104 105 107   BUN  --    < > 22 19 14 13   CREATININE  --    < > 1.2 1.2 1.0 1.0   ALKPHOS 207*  --  257*  --  201*  --    ALT 15  --  21  --  15  --    AST 28  --  47*  --  30  --    BILITOT 1.1*  --  0.9  --  0.9  --     < > = values in this interval not displayed.      Coagulation:   Recent Labs   Lab 06/26/25  0324 06/27/25 0427 06/28/25  0413   INR 1.5* 1.6* 1.7*   APTT 33.9* 32.5* 34.7*     LDH:  Recent Labs   Lab 06/26/25  0324 06/27/25  0427 06/28/25  0413    207 221     Microbiology:  Microbiology Results (last 7 days)       Procedure Component Value Units Date/Time    Blood culture [[id number]]  (Normal) Collected: 06/23/25 0646    Order Status: Completed Specimen: Blood from Peripheral, Antecubital, Right Updated: 06/28/25 0901     Blood Culture No Growth After 5 Days    Culture, Anaerobic [[id number]] Collected: 06/22/25 1450    Order Status: Completed Specimen: Bone from Toe, Right Foot Updated: 06/26/25 1056     Anaerobe Culture No Anaerobes Isolated    Aerobic culture [[id number]]  (Abnormal)  (Susceptibility) Collected: 06/22/25 1450    Order Status: Completed Specimen: Bone from Toe, Right Foot Updated: 06/25/25 1235     CULTURE, AEROBIC Few Enterococcus faecalis    Afb Culture Stain [[id number]] Collected: 06/22/25 1450    Order Status: Completed Specimen: Bone from Toe, Right Foot Updated: 06/23/25 1518     ACID FAST STAIN  No acid fast bacilli seen    Fungus culture [[id number]]  (Normal) Collected: 06/22/25 1450    Order Status: Completed Specimen: Bone from Toe, Right Foot  Updated: 06/23/25 1009     Fungal Culture Culture In Progress    Gram stain [[id number]] Collected: 06/22/25 1450    Order Status: Completed Specimen: Bone from Toe, Right Foot Updated: 06/22/25 1857     GRAM STAIN No WBCs, epithelial cells or organisms seen    AFB Culture & Smear [[id number]] Collected: 06/22/25 1450    Order Status: Resulted Specimen: Bone from Toe, Right Foot Updated: 06/22/25 1640            I have reviewed all pertinent labs within the past 24 hours.    Estimated Creatinine Clearance: 73.3 mL/min (based on SCr of 1 mg/dL).      Assessment and Plan:     No notes on file    * Type 2 diabetes mellitus  Pt presenting with hyperglycemia of nearly 600. Pt dry on exam on AM eval, overnight initiated on diuresis with IVP, discontinued this morning.   D/w Endocrine, given downtrending glucose levels, will initiate scheduled insulin.   -po intake per SLP recommendations - diabetic diet ordered   -endocrine following, appreciate recs.     Acute osteomyelitis of toe, right  Likely source for septic shock on presentation.  -podiatry seeing pt,  s/p amputation 6/22.   -Continue IV zosyn, Needs 6 week course of IV zosyn for the associtated enterococcus bacteremia.  -cont holding diuretics     Bacteremia  -cont Zosyn for enterococcus/E coli bacteremia  -ID following, appreciate recs  -R toe osteomyelitis is most likely source   -Needs 6 week course of IV zosyn for the enterococcus bacteremia    Shock  Pt initially presented with elevated lactic and elevated glucose, elevated serum osm but not quite to level of HHS. DKA workup negative. No pH for eval completed. Septic shock with source most likely R foot.     -endocrine following, appreciate recs  -stop diuresis, cont to hold   -monitor renal function   -podiatry c/s, appreciate recs, OR 6/22 for partial amputation       Anticoagulated  -cont warfarin per INR   -pharmacy assisting with dosing     LVAD (left ventricular assist device) present  Procedure: Device  Interrogation Including analysis of device parameters  Current Settings: Ventricular Assist Device  INR 1.6, will continue coumadin  INR therapeutic, Continue coumadin  Review of device function is stable/unstable stable        6/28/2025    10:03 AM 6/28/2025     6:12 AM 6/28/2025    12:01 AM 6/27/2025     4:15 PM 6/27/2025    12:00 PM 6/27/2025     7:57 AM 6/27/2025     4:50 AM   TXP LVAD INTERROGATIONS   Type HeartMate3 HeartMate3 HeartMate3 HeartMate3 HeartMate3 HeartMate3 HeartMate3   Flow 4.7 5200 4.6 4.3 4.7 4.3 4.3   Speed 5200 5400 5200 5200 5200 5200 5200   PI 4.8 6.1 6.1 6.6 5.5 6.5 5.9   Power (Edwards) 3.6 3.8 3.8 3.8 3.8 3.6 3.6   LSL 4800 4800 4800 4800 4800 4900 4800   Low Flow Alarm NO   no no no no   Pulsatility Pulse Pulse Pulse Pulse Pulse Pulse Pulse             Navya Hurst MD  Heart Transplant  [facility] - Cardiology Stepdown

## 2025-06-28 NOTE — PROGRESS NOTES
"[facility] - Cardiology Stepdown  Endocrinology  Progress Note    Admit Date: 2025     Reason for Consult: Management of T2DM, Hyperglycemia     Surgical Procedure and Date:  s/p right toe amputation 2025    Diabetes diagnosis year:     Home Diabetes Medications:  None currently     Lab Results   Component Value Date    HGBA1C 6.6 (H) 2023       How often checking glucose at home? 1x per week   BG readings on regimen: 120s  Hypoglycemia on the regimen?  N/A  Missed doses on regimen?  N/A     Diabetes Complications include:     Hyperglycemia    Complicating diabetes co morbidities:   HTN, CHF      HPI:   Patient is a 69 y.o. 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  He was admitted for syncope 2022 at which time he was found to have an evolving right cerebral acute subdural hematoma and acute basal cistern subarachnoid hemorrhage. Patient presented to outside hospital emergency room with generalized weakness.  Patient is a poor historian.  He reports falling 3 times at home and has had weakness over the past week.  EMS called, noted sugar greater than 500.  Upon arrival, oral temperature is 101.1° F. patient complaining of dysuria, inability to "hold his urine".  Patient states he saw a doctor today, unable to give a urine sample, but had his INR drawn which was greater than 4.5. Endocrinology consulted for management of T2DM.     Interval HPI:   No acute events overnight. Patient in room 3098/3098 A. Blood glucose stable. BG below goal on current insulin regimen (SSI, prandial, and basal insulin ). Steroid use- None .   5 Days Post-Op  Renal function- Normal   Vasopressors-  None      Diet Consistent Carbohydrate 2000 Calories (up to 75 gm per meal)      Eatin%  Nausea: No  Hypoglycemia and intervention: No  Fever: No  TPN and/or TF: No     /65 (BP Location: Left arm)   " "Pulse 77   Temp 99.5 °F (37.5 °C) (Oral)   Resp 18   Ht 6' 2" (1.88 m)   Wt 74.3 kg (163 lb 12.8 oz)   SpO2 97%   BMI 21.03 kg/m²     Labs Reviewed and Include    Recent Labs   Lab 06/28/25  0413   GLU 77   CALCIUM 8.9      K 4.4   CO2 21*      BUN 13   CREATININE 1.0     Lab Results   Component Value Date    WBC 9.21 06/28/2025    HGB 9.5 (L) 06/28/2025    HCT 30.9 (L) 06/28/2025    MCV 92 06/28/2025     06/28/2025     No results for input(s): "TSH", "FREET4" in the last 168 hours.  Lab Results   Component Value Date    HGBA1C 12.4 (H) 06/20/2025       Nutritional status:   Body mass index is 21.03 kg/m².  Lab Results   Component Value Date    ALBUMIN 2.1 (L) 06/27/2025    ALBUMIN 2.0 (L) 06/25/2025    ALBUMIN 2.2 (L) 06/23/2025     Lab Results   Component Value Date    PREALBUMIN 6 (L) 06/27/2025    PREALBUMIN 5 (L) 06/25/2025    PREALBUMIN 5 (L) 06/23/2025       Estimated Creatinine Clearance: 73.3 mL/min (based on SCr of 1 mg/dL).    Accu-Checks  Recent Labs     06/26/25  0632 06/26/25  0757 06/26/25  1146 06/26/25  1624 06/26/25 2015 06/27/25  0635 06/27/25  1110 06/27/25  1617 06/27/25  1645 06/28/25  0732   POCTGLUCOSE 137* 156* 159* 110 83 100 104 73 101 91       Current Medications and/or Treatments Impacting Glycemic Control  Immunotherapy:    Immunosuppressants       None          Steroids:   Hormones (From admission, onward)      None          Pressors:    Autonomic Drugs (From admission, onward)      None          Hyperglycemia/Diabetes Medications:   Antihyperglycemics (From admission, onward)      Start     Stop Route Frequency Ordered    06/28/25 1130  insulin aspart U-100 pen 3 Units         -- SubQ 3 times daily with meals 06/28/25 0828    06/28/25 0900  insulin glargine U-100 (Lantus) pen 11 Units         -- SubQ Daily 06/28/25 0828    06/20/25 1449  insulin aspart U-100 pen 0-5 Units         -- SubQ Before meals & nightly PRN 06/20/25 1349            ASSESSMENT and " PLAN    Cardiac/Vascular  Hyperlipidemia  May increase insulin resistance.         ID  Acute osteomyelitis of toe, right  S/p right toe amputation  Infection may elevate BG readings  On IV antibiotics  Managed per primary team  Avoid hypoglycemia        Endocrine  * Type 2 diabetes mellitus  BG goal 140-180  Type 2 DM. S/p R toe amputation on 6/22.  Blood sugars much improved and below goal.  Decrease regimen    Plan:  Decrease Lantus 11 units once daily   Decrease Novolog 3 units TID with meals   Low Dose Correction Scale  BG monitoring ac/hs    ** Please call Endocrine for any BG related issues **      Lab Results   Component Value Date    HGBA1C 12.4 (H) 06/20/2025     Bedside nurse to instruct on insulin pen use and blood sugar monitor. Have patient administer own injections after education completed supervised by nurse.    Discharge plans:  Likely will d/c with MDI regimen given poorly controlled A1c. Please notify Endocrine prior to d/c for updated recs    Patient would benefit from Dexcom at discharge if insurance approved. Will need follow up in Endocrine clinic and outpatient DM education scheduled at time of discharge.         Douglas Kim PA-C  Endocrinology  [facility] - Cardiology Stepdown

## 2025-06-28 NOTE — ASSESSMENT & PLAN NOTE
Procedure: Device Interrogation Including analysis of device parameters  Current Settings: Ventricular Assist Device  INR 1.6, will continue coumadin  INR therapeutic, Continue coumadin  Review of device function is stable/unstable stable        6/28/2025    10:03 AM 6/28/2025     6:12 AM 6/28/2025    12:01 AM 6/27/2025     4:15 PM 6/27/2025    12:00 PM 6/27/2025     7:57 AM 6/27/2025     4:50 AM   TXP LVAD INTERROGATIONS   Type HeartMate3 HeartMate3 HeartMate3 HeartMate3 HeartMate3 HeartMate3 HeartMate3   Flow 4.7 5200 4.6 4.3 4.7 4.3 4.3   Speed 5200 5400 5200 5200 5200 5200 5200   PI 4.8 6.1 6.1 6.6 5.5 6.5 5.9   Power (Edwards) 3.6 3.8 3.8 3.8 3.8 3.6 3.6   LSL 4800 4800 4800 4800 4800 4900 4800   Low Flow Alarm NO   no no no no   Pulsatility Pulse Pulse Pulse Pulse Pulse Pulse Pulse

## 2025-06-28 NOTE — SUBJECTIVE & OBJECTIVE
Interval History: NAEO. Patient has no new complaints. He feels better. He is tolerating antibiotics. He needs long term IV access for 6 weeks of antibiotics once home health is arranged.    Continuous Infusions:  Scheduled Meds:   amLODIPine  10 mg Oral Daily    atorvastatin  80 mg Oral Daily    insulin aspart U-100  3 Units Subcutaneous TIDWM    insulin glargine U-100  11 Units Subcutaneous Daily    magnesium citrate  296 mL Oral Once    mirtazapine  30 mg Oral QHS    piperacillin-tazobactam (Zosyn) IV (PEDS and ADULTS) (extended infusion is not appropriate)  4.5 g Intravenous Q8H    polyethylene glycol  17 g Oral Daily    spironolactone  25 mg Oral Daily    warfarin  2.5 mg Oral Every Tues, Thurs, Sat, Sun    warfarin  5 mg Oral Every Mon, Wed, Fri     PRN Meds:  Current Facility-Administered Medications:     acetaminophen, 650 mg, Oral, Q6H PRN    bisacodyL, 10 mg, Rectal, Daily PRN    chlorproMAZINE, 25 mg, Oral, TID PRN    dextrose 50%, 12.5 g, Intravenous, PRN    dextrose 50%, 25 g, Intravenous, PRN    fentaNYL, 25 mcg, Intravenous, Q5 Min PRN    glucagon (human recombinant), 1 mg, Intramuscular, PRN    glucose, 16 g, Oral, PRN    glucose, 24 g, Oral, PRN    insulin aspart U-100, 0-5 Units, Subcutaneous, QID (AC + HS) PRN    ondansetron, 4 mg, Intravenous, Daily PRN    Review of patient's allergies indicates:   Allergen Reactions    Pcn [penicillins] Hives     Objective:     Vital Signs (Most Recent):  Temp: 97.5 °F (36.4 °C) (06/28/25 0800)  Pulse: 78 (06/28/25 0958)  Resp: 18 (06/28/25 0800)  BP: (!) 80/0 (doppler) (06/28/25 0800)  SpO2: 97 % (06/28/25 0800) Vital Signs (24h Range):  Temp:  [97.5 °F (36.4 °C)-99.5 °F (37.5 °C)] 97.5 °F (36.4 °C)  Pulse:  [29-98] 78  Resp:  [18] 18  SpO2:  [95 %-99 %] 97 %  BP: ()/(0-74) 80/0     Patient Vitals for the past 72 hrs (Last 3 readings):   Weight   06/27/25 0745 74.3 kg (163 lb 12.8 oz)   06/27/25 0700 74.4 kg (164 lb 0.4 oz)   06/26/25 0615 72.8 kg (160 lb  7.9 oz)     Body mass index is 21.03 kg/m².      Intake/Output Summary (Last 24 hours) at 6/28/2025 1033  Last data filed at 6/28/2025 1007  Gross per 24 hour   Intake 1024 ml   Output 650 ml   Net 374 ml            Physical Exam  Vitals reviewed.   Constitutional:       Appearance: He is normal weight.   HENT:      Head: Normocephalic and atraumatic.      Right Ear: External ear normal.      Left Ear: External ear normal.      Nose: Nose normal.      Mouth/Throat:      Mouth: Mucous membranes are moist.      Pharynx: Oropharynx is clear.   Eyes:      Conjunctiva/sclera: Conjunctivae normal.   Cardiovascular:      Rate and Rhythm: Normal rate and regular rhythm.      Pulses: Normal pulses.   Pulmonary:      Effort: Pulmonary effort is normal.      Breath sounds: Normal breath sounds.   Abdominal:      General: Abdomen is flat. Bowel sounds are normal.      Palpations: Abdomen is soft.   Musculoskeletal:      Cervical back: Normal range of motion.      Right lower leg: No edema.      Left lower leg: No edema.   Skin:     General: Skin is warm.      Capillary Refill: Capillary refill takes less than 2 seconds.   Neurological:      Mental Status: He is alert. Mental status is at baseline.   Psychiatric:         Mood and Affect: Mood normal.         Behavior: Behavior normal.            Significant Labs:  CBC:  Recent Labs   Lab 06/26/25  0324 06/27/25 0427 06/28/25 0413   WBC 11.12 10.03 9.21   RBC 3.24* 2.99* 3.36*   HGB 9.2* 8.6* 9.5*   HCT 29.3* 27.0* 30.9*    243 245   MCV 90 90 92   MCH 28.4 28.8 28.3   MCHC 31.4* 31.9* 30.7*     BNP:  Recent Labs   Lab 06/23/25  0646 06/25/25  0246 06/27/25 0427   * 424* 542*     CMP:  Recent Labs   Lab 06/23/25  0646 06/23/25  0851 06/25/25  0246 06/26/25  0324 06/27/25 0427 06/28/25 0413   GLU  --    < > 125* 130* 82 77   CALCIUM  --    < > 8.5* 8.3* 8.1* 8.9   ALBUMIN 2.2*  --  2.0*  --  2.1*  --    PROT 9.0*  --  8.5*  --  8.7*  --    NA  --    < > 133*  135* 136 138   K  --    < > 4.2 4.1 3.9 4.4   CO2  --    < > 24 24 23 21*   CL  --    < > 100 104 105 107   BUN  --    < > 22 19 14 13   CREATININE  --    < > 1.2 1.2 1.0 1.0   ALKPHOS 207*  --  257*  --  201*  --    ALT 15  --  21  --  15  --    AST 28  --  47*  --  30  --    BILITOT 1.1*  --  0.9  --  0.9  --     < > = values in this interval not displayed.      Coagulation:   Recent Labs   Lab 06/26/25  0324 06/27/25  0427 06/28/25  0413   INR 1.5* 1.6* 1.7*   APTT 33.9* 32.5* 34.7*     LDH:  Recent Labs   Lab 06/26/25  0324 06/27/25  0427 06/28/25  0413    207 221     Microbiology:  Microbiology Results (last 7 days)       Procedure Component Value Units Date/Time    Blood culture [[id number]]  (Normal) Collected: 06/23/25 0646    Order Status: Completed Specimen: Blood from Peripheral, Antecubital, Right Updated: 06/28/25 0901     Blood Culture No Growth After 5 Days    Culture, Anaerobic [[id number]] Collected: 06/22/25 1450    Order Status: Completed Specimen: Bone from Toe, Right Foot Updated: 06/26/25 1056     Anaerobe Culture No Anaerobes Isolated    Aerobic culture [[id number]]  (Abnormal)  (Susceptibility) Collected: 06/22/25 1450    Order Status: Completed Specimen: Bone from Toe, Right Foot Updated: 06/25/25 1235     CULTURE, AEROBIC Few Enterococcus faecalis    Afb Culture Stain [[id number]] Collected: 06/22/25 1450    Order Status: Completed Specimen: Bone from Toe, Right Foot Updated: 06/23/25 1518     ACID FAST STAIN  No acid fast bacilli seen    Fungus culture [[id number]]  (Normal) Collected: 06/22/25 1450    Order Status: Completed Specimen: Bone from Toe, Right Foot Updated: 06/23/25 1009     Fungal Culture Culture In Progress    Gram stain [[id number]] Collected: 06/22/25 1450    Order Status: Completed Specimen: Bone from Toe, Right Foot Updated: 06/22/25 1857     GRAM STAIN No WBCs, epithelial cells or organisms seen    AFB Culture & Smear [[id number]] Collected: 06/22/25 6326     Order Status: Resulted Specimen: Bone from Toe, Right Foot Updated: 06/22/25 1640            I have reviewed all pertinent labs within the past 24 hours.    Estimated Creatinine Clearance: 73.3 mL/min (based on SCr of 1 mg/dL).

## 2025-06-28 NOTE — NURSING
AAOX4,VSS,O2 sats>96% on room air. LVAD has a smooth hum. Plan of care discussed with patient.Patient has no complaints of pain/SOB. Discussed medications and care. Patient has no questions at this time.Pt visualised and stable.Call light within reach.Pt resting,bed at lowest position.Will continue to monitor through the shift.

## 2025-06-28 NOTE — PLAN OF CARE
Problem: Adult Inpatient Plan of Care  Goal: Plan of Care Review  Outcome: Progressing  Goal: Patient-Specific Goal (Individualized)  Outcome: Progressing  Goal: Absence of Hospital-Acquired Illness or Injury  Outcome: Progressing  Goal: Optimal Comfort and Wellbeing  Outcome: Progressing  Goal: Readiness for Transition of Care  Outcome: Progressing     Problem: Diabetes Comorbidity  Goal: Blood Glucose Level Within Targeted Range  Outcome: Progressing     Problem: Sepsis/Septic Shock  Goal: Optimal Coping  Outcome: Progressing  Goal: Absence of Bleeding  Outcome: Progressing  Goal: Blood Glucose Level Within Targeted Range  Outcome: Progressing  Goal: Absence of Infection Signs and Symptoms  Outcome: Progressing  Goal: Optimal Nutrition Intake  Outcome: Progressing     Problem: Ventricular Assist Device  Goal: Optimal Adjustment to Device  Outcome: Progressing  Goal: Absence of Bleeding  Outcome: Progressing  Goal: Absence of Embolism Signs and Symptoms  Outcome: Progressing  Goal: Optimal Blood Flow  Outcome: Progressing  Goal: Absence of Infection Signs and Symptoms  Outcome: Progressing  Goal: Effective Right-Sided Heart Function  Outcome: Progressing     Problem: Skin Injury Risk Increased  Goal: Skin Health and Integrity  Outcome: Progressing     Problem: Wound  Goal: Optimal Coping  Outcome: Progressing  Goal: Optimal Functional Ability  Outcome: Progressing  Goal: Absence of Infection Signs and Symptoms  Outcome: Progressing  Goal: Improved Oral Intake  Outcome: Progressing  Goal: Optimal Pain Control and Function  Outcome: Progressing  Goal: Skin Health and Integrity  Outcome: Progressing  Goal: Optimal Wound Healing  Outcome: Progressing     Problem: Fall Injury Risk  Goal: Absence of Fall and Fall-Related Injury  Outcome: Progressing

## 2025-06-28 NOTE — SUBJECTIVE & OBJECTIVE
"Interval HPI:   No acute events overnight. Patient in room 3098/3098 A. Blood glucose stable. BG below goal on current insulin regimen (SSI, prandial, and basal insulin ). Steroid use- None .   5 Days Post-Op  Renal function- Normal   Vasopressors-  None      Diet Consistent Carbohydrate 2000 Calories (up to 75 gm per meal)      Eatin%  Nausea: No  Hypoglycemia and intervention: No  Fever: No  TPN and/or TF: No     /65 (BP Location: Left arm)   Pulse 77   Temp 99.5 °F (37.5 °C) (Oral)   Resp 18   Ht 6' 2" (1.88 m)   Wt 74.3 kg (163 lb 12.8 oz)   SpO2 97%   BMI 21.03 kg/m²     Labs Reviewed and Include    Recent Labs   Lab 25  0413   GLU 77   CALCIUM 8.9      K 4.4   CO2 21*      BUN 13   CREATININE 1.0     Lab Results   Component Value Date    WBC 9.21 2025    HGB 9.5 (L) 2025    HCT 30.9 (L) 2025    MCV 92 2025     2025     No results for input(s): "TSH", "FREET4" in the last 168 hours.  Lab Results   Component Value Date    HGBA1C 12.4 (H) 2025       Nutritional status:   Body mass index is 21.03 kg/m².  Lab Results   Component Value Date    ALBUMIN 2.1 (L) 2025    ALBUMIN 2.0 (L) 2025    ALBUMIN 2.2 (L) 2025     Lab Results   Component Value Date    PREALBUMIN 6 (L) 2025    PREALBUMIN 5 (L) 2025    PREALBUMIN 5 (L) 2025       Estimated Creatinine Clearance: 73.3 mL/min (based on SCr of 1 mg/dL).    Accu-Checks  Recent Labs     25  0632 25  0757 25  1146 25  1624 25  0635 25  1110 25  1617 25  1645 25  0732   POCTGLUCOSE 137* 156* 159* 110 83 100 104 73 101 91       Current Medications and/or Treatments Impacting Glycemic Control  Immunotherapy:    Immunosuppressants       None          Steroids:   Hormones (From admission, onward)      None          Pressors:    Autonomic Drugs (From admission, onward)      None      "     Hyperglycemia/Diabetes Medications:   Antihyperglycemics (From admission, onward)      Start     Stop Route Frequency Ordered    06/28/25 1130  insulin aspart U-100 pen 3 Units         -- SubQ 3 times daily with meals 06/28/25 0828    06/28/25 0900  insulin glargine U-100 (Lantus) pen 11 Units         -- SubQ Daily 06/28/25 0828    06/20/25 1449  insulin aspart U-100 pen 0-5 Units         -- SubQ Before meals & nightly PRN 06/20/25 1349

## 2025-06-29 LAB
ABSOLUTE EOSINOPHIL (OHS): 0.23 K/UL
ABSOLUTE MONOCYTE (OHS): 0.9 K/UL (ref 0.3–1)
ABSOLUTE NEUTROPHIL COUNT (OHS): 5.22 K/UL (ref 1.8–7.7)
ANION GAP (OHS): 6 MMOL/L (ref 8–16)
APTT PPP: 34.2 SECONDS (ref 21–32)
BASOPHILS # BLD AUTO: 0.07 K/UL
BASOPHILS NFR BLD AUTO: 0.8 %
BUN SERPL-MCNC: 19 MG/DL (ref 8–23)
CALCIUM SERPL-MCNC: 8.1 MG/DL (ref 8.7–10.5)
CHLORIDE SERPL-SCNC: 108 MMOL/L (ref 95–110)
CO2 SERPL-SCNC: 21 MMOL/L (ref 23–29)
CREAT SERPL-MCNC: 1 MG/DL (ref 0.5–1.4)
ERYTHROCYTE [DISTWIDTH] IN BLOOD BY AUTOMATED COUNT: 14.5 % (ref 11.5–14.5)
GFR SERPLBLD CREATININE-BSD FMLA CKD-EPI: >60 ML/MIN/1.73/M2
GLUCOSE SERPL-MCNC: 118 MG/DL (ref 70–110)
HCT VFR BLD AUTO: 27.1 % (ref 40–54)
HGB BLD-MCNC: 8.5 GM/DL (ref 14–18)
IMM GRANULOCYTES # BLD AUTO: 0.05 K/UL (ref 0–0.04)
IMM GRANULOCYTES NFR BLD AUTO: 0.6 % (ref 0–0.5)
INR PPP: 1.8 (ref 0.8–1.2)
LDH SERPL-CCNC: 177 U/L (ref 110–260)
LYMPHOCYTES # BLD AUTO: 1.96 K/UL (ref 1–4.8)
MAGNESIUM SERPL-MCNC: 1.9 MG/DL (ref 1.6–2.6)
MCH RBC QN AUTO: 28.7 PG (ref 27–31)
MCHC RBC AUTO-ENTMCNC: 31.4 G/DL (ref 32–36)
MCV RBC AUTO: 92 FL (ref 82–98)
NUCLEATED RBC (/100WBC) (OHS): 0 /100 WBC
PHOSPHATE SERPL-MCNC: 2.3 MG/DL (ref 2.7–4.5)
PLATELET # BLD AUTO: 235 K/UL (ref 150–450)
PMV BLD AUTO: 10.4 FL (ref 9.2–12.9)
POCT GLUCOSE: 127 MG/DL (ref 70–110)
POCT GLUCOSE: 128 MG/DL (ref 70–110)
POCT GLUCOSE: 158 MG/DL (ref 70–110)
POCT GLUCOSE: 163 MG/DL (ref 70–110)
POTASSIUM SERPL-SCNC: 4.3 MMOL/L (ref 3.5–5.1)
PROTHROMBIN TIME: 18.5 SECONDS (ref 9–12.5)
RBC # BLD AUTO: 2.96 M/UL (ref 4.6–6.2)
RELATIVE EOSINOPHIL (OHS): 2.7 %
RELATIVE LYMPHOCYTE (OHS): 23.3 % (ref 18–48)
RELATIVE MONOCYTE (OHS): 10.7 % (ref 4–15)
RELATIVE NEUTROPHIL (OHS): 61.9 % (ref 38–73)
SODIUM SERPL-SCNC: 135 MMOL/L (ref 136–145)
WBC # BLD AUTO: 8.43 K/UL (ref 3.9–12.7)

## 2025-06-29 PROCEDURE — 25000003 PHARM REV CODE 250: Performed by: INTERNAL MEDICINE

## 2025-06-29 PROCEDURE — 25000003 PHARM REV CODE 250: Performed by: STUDENT IN AN ORGANIZED HEALTH CARE EDUCATION/TRAINING PROGRAM

## 2025-06-29 PROCEDURE — 63600175 PHARM REV CODE 636 W HCPCS: Performed by: INTERNAL MEDICINE

## 2025-06-29 PROCEDURE — 84100 ASSAY OF PHOSPHORUS: CPT | Performed by: STUDENT IN AN ORGANIZED HEALTH CARE EDUCATION/TRAINING PROGRAM

## 2025-06-29 PROCEDURE — 99233 SBSQ HOSP IP/OBS HIGH 50: CPT | Mod: ,,, | Performed by: INTERNAL MEDICINE

## 2025-06-29 PROCEDURE — 80048 BASIC METABOLIC PNL TOTAL CA: CPT | Performed by: BEHAVIOR TECHNICIAN

## 2025-06-29 PROCEDURE — 36415 COLL VENOUS BLD VENIPUNCTURE: CPT | Performed by: STUDENT IN AN ORGANIZED HEALTH CARE EDUCATION/TRAINING PROGRAM

## 2025-06-29 PROCEDURE — 83735 ASSAY OF MAGNESIUM: CPT | Performed by: STUDENT IN AN ORGANIZED HEALTH CARE EDUCATION/TRAINING PROGRAM

## 2025-06-29 PROCEDURE — 85730 THROMBOPLASTIN TIME PARTIAL: CPT | Performed by: STUDENT IN AN ORGANIZED HEALTH CARE EDUCATION/TRAINING PROGRAM

## 2025-06-29 PROCEDURE — 85025 COMPLETE CBC W/AUTO DIFF WBC: CPT | Performed by: BEHAVIOR TECHNICIAN

## 2025-06-29 PROCEDURE — 93750 INTERROGATION VAD IN PERSON: CPT | Mod: ,,, | Performed by: INTERNAL MEDICINE

## 2025-06-29 PROCEDURE — 20600001 HC STEP DOWN PRIVATE ROOM

## 2025-06-29 PROCEDURE — 85610 PROTHROMBIN TIME: CPT | Performed by: STUDENT IN AN ORGANIZED HEALTH CARE EDUCATION/TRAINING PROGRAM

## 2025-06-29 PROCEDURE — 94761 N-INVAS EAR/PLS OXIMETRY MLT: CPT

## 2025-06-29 PROCEDURE — 83615 LACTATE (LD) (LDH) ENZYME: CPT | Performed by: STUDENT IN AN ORGANIZED HEALTH CARE EDUCATION/TRAINING PROGRAM

## 2025-06-29 PROCEDURE — 25000003 PHARM REV CODE 250: Performed by: BEHAVIOR TECHNICIAN

## 2025-06-29 PROCEDURE — 27000248 HC VAD-ADDITIONAL DAY

## 2025-06-29 RX ORDER — MAGNESIUM SULFATE HEPTAHYDRATE 40 MG/ML
2 INJECTION, SOLUTION INTRAVENOUS ONCE
Status: COMPLETED | OUTPATIENT
Start: 2025-06-29 | End: 2025-06-30

## 2025-06-29 RX ADMIN — ATORVASTATIN CALCIUM 80 MG: 40 TABLET, FILM COATED ORAL at 09:06

## 2025-06-29 RX ADMIN — SPIRONOLACTONE 25 MG: 25 TABLET, FILM COATED ORAL at 09:06

## 2025-06-29 RX ADMIN — PIPERACILLIN SODIUM AND TAZOBACTAM SODIUM 4.5 G: 4; .5 INJECTION, POWDER, FOR SOLUTION INTRAVENOUS at 08:06

## 2025-06-29 RX ADMIN — WARFARIN SODIUM 2.5 MG: 2.5 TABLET ORAL at 06:06

## 2025-06-29 RX ADMIN — INSULIN ASPART 3 UNITS: 100 INJECTION, SOLUTION INTRAVENOUS; SUBCUTANEOUS at 06:06

## 2025-06-29 RX ADMIN — MIRTAZAPINE 30 MG: 30 TABLET, FILM COATED ORAL at 08:06

## 2025-06-29 RX ADMIN — AMLODIPINE BESYLATE 10 MG: 10 TABLET ORAL at 09:06

## 2025-06-29 RX ADMIN — PIPERACILLIN SODIUM AND TAZOBACTAM SODIUM 4.5 G: 4; .5 INJECTION, POWDER, FOR SOLUTION INTRAVENOUS at 12:06

## 2025-06-29 RX ADMIN — INSULIN ASPART 3 UNITS: 100 INJECTION, SOLUTION INTRAVENOUS; SUBCUTANEOUS at 09:06

## 2025-06-29 RX ADMIN — PIPERACILLIN SODIUM AND TAZOBACTAM SODIUM 4.5 G: 4; .5 INJECTION, POWDER, FOR SOLUTION INTRAVENOUS at 03:06

## 2025-06-29 RX ADMIN — INSULIN ASPART 3 UNITS: 100 INJECTION, SOLUTION INTRAVENOUS; SUBCUTANEOUS at 12:06

## 2025-06-29 RX ADMIN — INSULIN GLARGINE 11 UNITS: 100 INJECTION, SOLUTION SUBCUTANEOUS at 09:06

## 2025-06-29 NOTE — ASSESSMENT & PLAN NOTE
Procedure: Device Interrogation Including analysis of device parameters  Current Settings: Ventricular Assist Device  INR 1.6, will continue coumadin  INR therapeutic, Continue coumadin  Review of device function is stable/unstable stable        6/29/2025     7:45 AM 6/29/2025     4:00 AM 6/29/2025    12:20 AM 6/28/2025     7:26 PM 6/28/2025     4:00 PM 6/28/2025    12:00 PM 6/28/2025    10:03 AM   TXP LVAD INTERROGATIONS   Type HeartMate3 HeartMate3 HeartMate3 HeartMate3 HeartMate3 HeartMate3 HeartMate3   Flow  4.6 4.3 4.5 4.6 4.5    Speed 5200 5200 5200 5200 5200 5200 5200   PI 4.8 5.3 5.9 5.6 5.4 5.5 4.8   Power (Edwards) 4.7 3.6 3.6 3.6 3.5 3.6 3.6   LSL 4800 4800 4800 4800 4800 4800 4800   Low Flow Alarm  no no no no no NO   Pulsatility  Pulse  Pulse Pulse Pulse Pulse

## 2025-06-29 NOTE — CARE UPDATE
-Glucose Goal 140-180    -A1C:   Hemoglobin A1C   Date Value Ref Range Status   05/19/2023 6.6 (H) 4.0 - 5.6 % Final     Comment:     ADA Screening Guidelines:  5.7-6.4%  Consistent with prediabetes  >or=6.5%  Consistent with diabetes    High levels of fetal hemoglobin interfere with the HbA1C  assay. Heterozygous hemoglobin variants (HbS, HgC, etc)do  not significantly interfere with this assay.   However, presence of multiple variants may affect accuracy.       Hemoglobin A1c   Date Value Ref Range Status   06/20/2025 12.4 (H) 4.0 - 5.6 % Final     Comment:     ADA Screening Guidelines:  5.7-6.4%  Consistent with prediabetes  >=6.5%  Consistent with diabetes    High levels of fetal hemoglobin interfere with the HbA1C  assay. Heterozygous hemoglobin variants (HbS, HgC, etc)do  not significantly interfere with this assay.   However, presence of multiple variants may affect accuracy.         -HOME REGIMEN:     -GLUCOSE TREND FOR THE PAST 24HRS:   Recent Labs   Lab 06/27/25  1645 06/28/25  0732 06/28/25  1125 06/28/25  1612 06/28/25  2136 06/29/25  0639   POCTGLUCOSE 101 91 203* 162* 185* 128*         -NO HYPOGYCEMIAS NOTED     - Diet  Diet Consistent Carbohydrate 2000 Calories (up to 75 gm per meal)    -Type 2 DM. S/p R toe amputation on 6/22.  Blood sugars much improved    Plan:  Continue Lantus 11 units once daily   Continue Novolog 3 units TID with meals   Low Dose Correction Scale  BG monitoring ac/hs

## 2025-06-29 NOTE — PLAN OF CARE
Problem: Adult Inpatient Plan of Care  Goal: Plan of Care Review  Outcome: Progressing  Goal: Patient-Specific Goal (Individualized)  Outcome: Progressing  Goal: Optimal Comfort and Wellbeing  Outcome: Progressing     Problem: Sepsis/Septic Shock  Goal: Optimal Coping  Outcome: Progressing     Problem: Fall Injury Risk  Goal: Absence of Fall and Fall-Related Injury  Outcome: Progressing

## 2025-06-29 NOTE — PROGRESS NOTES
[facility] - Cardiology Step[facility]  Heart Transplant  Progress Note    Patient Name: Rocco France  MRN: [id number]  Admission Date: 6/20/2025  Hospital Length of Stay: 9 days  Attending Physician: Mike Chauhan MD  Primary Care Provider: Jay Guardado DO  Principal Problem:Type 2 diabetes mellitus    Subjective:   Interval History: NAEON. Patient has no new complaints. He feels better. He is tolerating antibiotics. He needs long term IV access for 6 weeks of antibiotics once home health is arranged. Hopefully plan for dc early this week once arranged. INR 1.8, will adjust warfarin as needed.    Continuous Infusions:  Scheduled Meds:   amLODIPine  10 mg Oral Daily    atorvastatin  80 mg Oral Daily    insulin aspart U-100  3 Units Subcutaneous TIDWM    insulin glargine U-100  11 Units Subcutaneous Daily    magnesium citrate  296 mL Oral Once    mirtazapine  30 mg Oral QHS    piperacillin-tazobactam (Zosyn) IV (PEDS and ADULTS) (extended infusion is not appropriate)  4.5 g Intravenous Q8H    polyethylene glycol  17 g Oral Daily    spironolactone  25 mg Oral Daily    warfarin  2.5 mg Oral Every Tues, Thurs, Sat, Sun    warfarin  5 mg Oral Every Mon, Wed, Fri     PRN Meds:  Current Facility-Administered Medications:     acetaminophen, 650 mg, Oral, Q6H PRN    bisacodyL, 10 mg, Rectal, Daily PRN    chlorproMAZINE, 25 mg, Oral, TID PRN    dextrose 50%, 12.5 g, Intravenous, PRN    dextrose 50%, 25 g, Intravenous, PRN    fentaNYL, 25 mcg, Intravenous, Q5 Min PRN    glucagon (human recombinant), 1 mg, Intramuscular, PRN    glucose, 16 g, Oral, PRN    glucose, 24 g, Oral, PRN    insulin aspart U-100, 0-5 Units, Subcutaneous, QID (AC + HS) PRN    ondansetron, 4 mg, Intravenous, Daily PRN    Review of patient's allergies indicates:   Allergen Reactions    Pcn [penicillins] Hives     Objective:     Vital Signs (Most Recent):  Temp: 98.9 °F (37.2 °C) (06/29/25 0745)  Pulse: 75 (06/29/25 0600)  Resp: 16 (06/29/25 0745)  BP: (!)  80/0 (06/29/25 0745)  SpO2: 99 % (06/29/25 0745) Vital Signs (24h Range):  Temp:  [97.6 °F (36.4 °C)-98.9 °F (37.2 °C)] 98.9 °F (37.2 °C)  Pulse:  [68-89] 75  Resp:  [16-19] 16  SpO2:  [98 %-100 %] 99 %  BP: (80-82)/(0) 80/0     Patient Vitals for the past 72 hrs (Last 3 readings):   Weight   06/27/25 0745 74.3 kg (163 lb 12.8 oz)   06/27/25 0700 74.4 kg (164 lb 0.4 oz)     Body mass index is 21.03 kg/m².      Intake/Output Summary (Last 24 hours) at 6/29/2025 0954  Last data filed at 6/29/2025 0823  Gross per 24 hour   Intake 820 ml   Output 1175 ml   Net -355 ml            Physical Exam  Vitals reviewed.   Constitutional:       Appearance: He is normal weight.   HENT:      Head: Normocephalic and atraumatic.      Right Ear: External ear normal.      Left Ear: External ear normal.      Nose: Nose normal.      Mouth/Throat:      Mouth: Mucous membranes are moist.      Pharynx: Oropharynx is clear.   Eyes:      Conjunctiva/sclera: Conjunctivae normal.   Cardiovascular:      Rate and Rhythm: Normal rate and regular rhythm.      Pulses: Normal pulses.   Pulmonary:      Effort: Pulmonary effort is normal.      Breath sounds: Normal breath sounds.   Abdominal:      General: Abdomen is flat. Bowel sounds are normal.      Palpations: Abdomen is soft.   Musculoskeletal:      Cervical back: Normal range of motion.      Right lower leg: No edema.      Left lower leg: No edema.   Skin:     General: Skin is warm.      Capillary Refill: Capillary refill takes less than 2 seconds.   Neurological:      Mental Status: He is alert. Mental status is at baseline.   Psychiatric:         Mood and Affect: Mood normal.         Behavior: Behavior normal.            Significant Labs:  CBC:  Recent Labs   Lab 06/27/25  0427 06/28/25  0413 06/29/25  0520   WBC 10.03 9.21 8.43   RBC 2.99* 3.36* 2.96*   HGB 8.6* 9.5* 8.5*   HCT 27.0* 30.9* 27.1*    245 235   MCV 90 92 92   MCH 28.8 28.3 28.7   MCHC 31.9* 30.7* 31.4*     BNP:  Recent Labs    Lab 06/23/25  0646 06/25/25  0246 06/27/25  0427   * 424* 542*     CMP:  Recent Labs   Lab 06/23/25  0646 06/23/25  0851 06/25/25  0246 06/26/25  0324 06/27/25  0427 06/28/25  0413 06/29/25  0520   GLU  --    < > 125*   < > 82 77 118*   CALCIUM  --    < > 8.5*   < > 8.1* 8.9 8.1*   ALBUMIN 2.2*  --  2.0*  --  2.1*  --   --    PROT 9.0*  --  8.5*  --  8.7*  --   --    NA  --    < > 133*   < > 136 138 135*   K  --    < > 4.2   < > 3.9 4.4 4.3   CO2  --    < > 24   < > 23 21* 21*   CL  --    < > 100   < > 105 107 108   BUN  --    < > 22   < > 14 13 19   CREATININE  --    < > 1.2   < > 1.0 1.0 1.0   ALKPHOS 207*  --  257*  --  201*  --   --    ALT 15  --  21  --  15  --   --    AST 28  --  47*  --  30  --   --    BILITOT 1.1*  --  0.9  --  0.9  --   --     < > = values in this interval not displayed.      Coagulation:   Recent Labs   Lab 06/27/25 0427 06/28/25  0413 06/29/25  0520   INR 1.6* 1.7* 1.8*   APTT 32.5* 34.7* 34.2*     LDH:  Recent Labs   Lab 06/27/25 0427 06/28/25  0413 06/29/25  0520    221 177     Microbiology:  Microbiology Results (last 7 days)       Procedure Component Value Units Date/Time    Blood culture [[id number]]  (Normal) Collected: 06/23/25 0646    Order Status: Completed Specimen: Blood from Peripheral, Antecubital, Right Updated: 06/28/25 0901     Blood Culture No Growth After 5 Days    Culture, Anaerobic [[id number]] Collected: 06/22/25 1450    Order Status: Completed Specimen: Bone from Toe, Right Foot Updated: 06/26/25 1056     Anaerobe Culture No Anaerobes Isolated    Aerobic culture [[id number]]  (Abnormal)  (Susceptibility) Collected: 06/22/25 1450    Order Status: Completed Specimen: Bone from Toe, Right Foot Updated: 06/25/25 1235     CULTURE, AEROBIC Few Enterococcus faecalis    Afb Culture Stain [[id number]] Collected: 06/22/25 1450    Order Status: Completed Specimen: Bone from Toe, Right Foot Updated: 06/23/25 1518     ACID FAST STAIN  No acid fast bacilli seen     Fungus culture [[id number]]  (Normal) Collected: 06/22/25 1450    Order Status: Completed Specimen: Bone from Toe, Right Foot Updated: 06/23/25 1009     Fungal Culture Culture In Progress    Gram stain [[id number]] Collected: 06/22/25 1450    Order Status: Completed Specimen: Bone from Toe, Right Foot Updated: 06/22/25 1857     GRAM STAIN No WBCs, epithelial cells or organisms seen    AFB Culture & Smear [[id number]] Collected: 06/22/25 1450    Order Status: Resulted Specimen: Bone from Toe, Right Foot Updated: 06/22/25 1640            I have reviewed all pertinent labs within the past 24 hours.    Estimated Creatinine Clearance: 73.3 mL/min (based on SCr of 1 mg/dL).      Assessment and Plan:     No notes on file    * Type 2 diabetes mellitus  Pt presenting with hyperglycemia of nearly 600. Pt dry on exam on AM eval, overnight initiated on diuresis with IVP, discontinued this morning.   D/w Endocrine, given downtrending glucose levels, will initiate scheduled insulin.   -po intake per SLP recommendations - diabetic diet ordered   -endocrine following, appreciate recs.     LVAD (left ventricular assist device) present  Procedure: Device Interrogation Including analysis of device parameters  Current Settings: Ventricular Assist Device  INR 1.6, will continue coumadin  INR therapeutic, Continue coumadin  Review of device function is stable/unstable stable        6/29/2025     7:45 AM 6/29/2025     4:00 AM 6/29/2025    12:20 AM 6/28/2025     7:26 PM 6/28/2025     4:00 PM 6/28/2025    12:00 PM 6/28/2025    10:03 AM   TXP LVAD INTERROGATIONS   Type HeartMate3 HeartMate3 HeartMate3 HeartMate3 HeartMate3 HeartMate3 HeartMate3   Flow  4.6 4.3 4.5 4.6 4.5    Speed 5200 5200 5200 5200 5200 5200 5200   PI 4.8 5.3 5.9 5.6 5.4 5.5 4.8   Power (Edwards) 4.7 3.6 3.6 3.6 3.5 3.6 3.6   LSL 4800 4800 4800 4800 4800 4800 4800   Low Flow Alarm  no no no no no NO   Pulsatility  Pulse  Pulse Pulse Pulse Pulse          Anticoagulated  -cont warfarin per INR   -pharmacy assisting with dosing     Acute osteomyelitis of toe, right  Likely source for septic shock on presentation.  -podiatry seeing pt,  s/p amputation 6/22.   -Continue IV zosyn, Needs 6 week course of IV zosyn for the associtated enterococcus bacteremia.  -cont holding diuretics     Bacteremia  -cont Zosyn for enterococcus/E coli bacteremia  -ID following, appreciate recs  -R toe osteomyelitis is most likely source   -Needs 6 week course of IV zosyn for the enterococcus bacteremia  -awaiting HH arrangement prior to d/c. Will need picc prior to dc.    Shock  Pt initially presented with elevated lactic and elevated glucose, elevated serum osm but not quite to level of HHS. DKA workup negative. No pH for eval completed. Septic shock with source most likely R foot.     -endocrine following, appreciate recs  -stop diuresis, cont to hold   -monitor renal function   -podiatry c/s, appreciate recs, OR 6/22 for partial amputation           Blanca Whitten MD  Heart Transplant  [facility] - [facility]iology Step[facility]

## 2025-06-29 NOTE — SUBJECTIVE & OBJECTIVE
Interval History: NAEON. Patient has no new complaints. He feels better. He is tolerating antibiotics. He needs long term IV access for 6 weeks of antibiotics once home health is arranged. Hopefully plan for dc early this week once arranged. INR 1.8, will adjust warfarin as needed.    Continuous Infusions:  Scheduled Meds:   amLODIPine  10 mg Oral Daily    atorvastatin  80 mg Oral Daily    insulin aspart U-100  3 Units Subcutaneous TIDWM    insulin glargine U-100  11 Units Subcutaneous Daily    magnesium citrate  296 mL Oral Once    mirtazapine  30 mg Oral QHS    piperacillin-tazobactam (Zosyn) IV (PEDS and ADULTS) (extended infusion is not appropriate)  4.5 g Intravenous Q8H    polyethylene glycol  17 g Oral Daily    spironolactone  25 mg Oral Daily    warfarin  2.5 mg Oral Every Tues, Thurs, Sat, Sun    warfarin  5 mg Oral Every Mon, Wed, Fri     PRN Meds:  Current Facility-Administered Medications:     acetaminophen, 650 mg, Oral, Q6H PRN    bisacodyL, 10 mg, Rectal, Daily PRN    chlorproMAZINE, 25 mg, Oral, TID PRN    dextrose 50%, 12.5 g, Intravenous, PRN    dextrose 50%, 25 g, Intravenous, PRN    fentaNYL, 25 mcg, Intravenous, Q5 Min PRN    glucagon (human recombinant), 1 mg, Intramuscular, PRN    glucose, 16 g, Oral, PRN    glucose, 24 g, Oral, PRN    insulin aspart U-100, 0-5 Units, Subcutaneous, QID (AC + HS) PRN    ondansetron, 4 mg, Intravenous, Daily PRN    Review of patient's allergies indicates:   Allergen Reactions    Pcn [penicillins] Hives     Objective:     Vital Signs (Most Recent):  Temp: 98.9 °F (37.2 °C) (06/29/25 0745)  Pulse: 75 (06/29/25 0600)  Resp: 16 (06/29/25 0745)  BP: (!) 80/0 (06/29/25 0745)  SpO2: 99 % (06/29/25 0745) Vital Signs (24h Range):  Temp:  [97.6 °F (36.4 °C)-98.9 °F (37.2 °C)] 98.9 °F (37.2 °C)  Pulse:  [68-89] 75  Resp:  [16-19] 16  SpO2:  [98 %-100 %] 99 %  BP: (80-82)/(0) 80/0     Patient Vitals for the past 72 hrs (Last 3 readings):   Weight   06/27/25 0745 74.3 kg  (163 lb 12.8 oz)   06/27/25 0700 74.4 kg (164 lb 0.4 oz)     Body mass index is 21.03 kg/m².      Intake/Output Summary (Last 24 hours) at 6/29/2025 0954  Last data filed at 6/29/2025 0823  Gross per 24 hour   Intake 820 ml   Output 1175 ml   Net -355 ml            Physical Exam  Vitals reviewed.   Constitutional:       Appearance: He is normal weight.   HENT:      Head: Normocephalic and atraumatic.      Right Ear: External ear normal.      Left Ear: External ear normal.      Nose: Nose normal.      Mouth/Throat:      Mouth: Mucous membranes are moist.      Pharynx: Oropharynx is clear.   Eyes:      Conjunctiva/sclera: Conjunctivae normal.   Cardiovascular:      Rate and Rhythm: Normal rate and regular rhythm.      Pulses: Normal pulses.   Pulmonary:      Effort: Pulmonary effort is normal.      Breath sounds: Normal breath sounds.   Abdominal:      General: Abdomen is flat. Bowel sounds are normal.      Palpations: Abdomen is soft.   Musculoskeletal:      Cervical back: Normal range of motion.      Right lower leg: No edema.      Left lower leg: No edema.   Skin:     General: Skin is warm.      Capillary Refill: Capillary refill takes less than 2 seconds.   Neurological:      Mental Status: He is alert. Mental status is at baseline.   Psychiatric:         Mood and Affect: Mood normal.         Behavior: Behavior normal.            Significant Labs:  CBC:  Recent Labs   Lab 06/27/25 0427 06/28/25 0413 06/29/25  0520   WBC 10.03 9.21 8.43   RBC 2.99* 3.36* 2.96*   HGB 8.6* 9.5* 8.5*   HCT 27.0* 30.9* 27.1*    245 235   MCV 90 92 92   MCH 28.8 28.3 28.7   MCHC 31.9* 30.7* 31.4*     BNP:  Recent Labs   Lab 06/23/25  0646 06/25/25  0246 06/27/25 0427   * 424* 542*     CMP:  Recent Labs   Lab 06/23/25  0646 06/23/25  0851 06/25/25  0246 06/26/25  0324 06/27/25  0427 06/28/25  0413 06/29/25  0520   GLU  --    < > 125*   < > 82 77 118*   CALCIUM  --    < > 8.5*   < > 8.1* 8.9 8.1*   ALBUMIN 2.2*  --  2.0*   --  2.1*  --   --    PROT 9.0*  --  8.5*  --  8.7*  --   --    NA  --    < > 133*   < > 136 138 135*   K  --    < > 4.2   < > 3.9 4.4 4.3   CO2  --    < > 24   < > 23 21* 21*   CL  --    < > 100   < > 105 107 108   BUN  --    < > 22   < > 14 13 19   CREATININE  --    < > 1.2   < > 1.0 1.0 1.0   ALKPHOS 207*  --  257*  --  201*  --   --    ALT 15  --  21  --  15  --   --    AST 28  --  47*  --  30  --   --    BILITOT 1.1*  --  0.9  --  0.9  --   --     < > = values in this interval not displayed.      Coagulation:   Recent Labs   Lab 06/27/25 0427 06/28/25  0413 06/29/25  0520   INR 1.6* 1.7* 1.8*   APTT 32.5* 34.7* 34.2*     LDH:  Recent Labs   Lab 06/27/25  0427 06/28/25  0413 06/29/25  0520    221 177     Microbiology:  Microbiology Results (last 7 days)       Procedure Component Value Units Date/Time    Blood culture [[id number]]  (Normal) Collected: 06/23/25 0646    Order Status: Completed Specimen: Blood from Peripheral, Antecubital, Right Updated: 06/28/25 0901     Blood Culture No Growth After 5 Days    Culture, Anaerobic [[id number]] Collected: 06/22/25 1450    Order Status: Completed Specimen: Bone from Toe, Right Foot Updated: 06/26/25 1056     Anaerobe Culture No Anaerobes Isolated    Aerobic culture [[id number]]  (Abnormal)  (Susceptibility) Collected: 06/22/25 1450    Order Status: Completed Specimen: Bone from Toe, Right Foot Updated: 06/25/25 1235     CULTURE, AEROBIC Few Enterococcus faecalis    Afb Culture Stain [[id number]] Collected: 06/22/25 1450    Order Status: Completed Specimen: Bone from Toe, Right Foot Updated: 06/23/25 1518     ACID FAST STAIN  No acid fast bacilli seen    Fungus culture [[id number]]  (Normal) Collected: 06/22/25 1450    Order Status: Completed Specimen: Bone from Toe, Right Foot Updated: 06/23/25 1009     Fungal Culture Culture In Progress    Gram stain [[id number]] Collected: 06/22/25 1450    Order Status: Completed Specimen: Bone from Toe, Right Foot Updated:  06/22/25 1857     GRAM STAIN No WBCs, epithelial cells or organisms seen    AFB Culture & Smear [[id number]] Collected: 06/22/25 1451    Order Status: Resulted Specimen: Bone from Toe, Right Foot Updated: 06/22/25 1640            I have reviewed all pertinent labs within the past 24 hours.    Estimated Creatinine Clearance: 73.3 mL/min (based on SCr of 1 mg/dL).

## 2025-06-29 NOTE — ASSESSMENT & PLAN NOTE
-cont Zosyn for enterococcus/E coli bacteremia  -ID following, appreciate recs  -R toe osteomyelitis is most likely source   -Needs 6 week course of IV zosyn for the enterococcus bacteremia  -awaiting HH arrangement prior to d/c. Will need picc prior to dc.

## 2025-06-30 VITALS
RESPIRATION RATE: 18 BRPM | SYSTOLIC BLOOD PRESSURE: 82 MMHG | HEIGHT: 74 IN | TEMPERATURE: 99 F | BODY MASS INDEX: 21.23 KG/M2 | OXYGEN SATURATION: 99 % | DIASTOLIC BLOOD PRESSURE: 67 MMHG | WEIGHT: 165.38 LBS | HEART RATE: 82 BPM

## 2025-06-30 PROBLEM — I50.84 END STAGE HEART FAILURE: Status: ACTIVE | Noted: 2025-06-30

## 2025-06-30 PROBLEM — E83.42 HYPOMAGNESEMIA: Status: ACTIVE | Noted: 2025-06-30

## 2025-06-30 LAB
ABSOLUTE EOSINOPHIL (OHS): 0.22 K/UL
ABSOLUTE MONOCYTE (OHS): 1.02 K/UL (ref 0.3–1)
ABSOLUTE NEUTROPHIL COUNT (OHS): 5.38 K/UL (ref 1.8–7.7)
ALBUMIN SERPL BCP-MCNC: 2 G/DL (ref 3.5–5.2)
ALP SERPL-CCNC: 158 UNIT/L (ref 40–150)
ALT SERPL W/O P-5'-P-CCNC: 8 UNIT/L (ref 10–44)
ANION GAP (OHS): 7 MMOL/L (ref 8–16)
APTT PPP: 33.7 SECONDS (ref 21–32)
AST SERPL-CCNC: 19 UNIT/L (ref 11–45)
BASOPHILS # BLD AUTO: 0.06 K/UL
BASOPHILS NFR BLD AUTO: 0.7 %
BILIRUB DIRECT SERPL-MCNC: 0.4 MG/DL (ref 0.1–0.3)
BILIRUB SERPL-MCNC: 0.8 MG/DL (ref 0.1–1)
BNP SERPL-MCNC: 1038 PG/ML (ref 0–99)
BUN SERPL-MCNC: 17 MG/DL (ref 8–23)
CALCIUM SERPL-MCNC: 8 MG/DL (ref 8.7–10.5)
CHLORIDE SERPL-SCNC: 108 MMOL/L (ref 95–110)
CO2 SERPL-SCNC: 19 MMOL/L (ref 23–29)
CREAT SERPL-MCNC: 0.9 MG/DL (ref 0.5–1.4)
CRP SERPL-MCNC: 54.9 MG/L
ERYTHROCYTE [DISTWIDTH] IN BLOOD BY AUTOMATED COUNT: 14.6 % (ref 11.5–14.5)
GFR SERPLBLD CREATININE-BSD FMLA CKD-EPI: >60 ML/MIN/1.73/M2
GLUCOSE SERPL-MCNC: 105 MG/DL (ref 70–110)
HCT VFR BLD AUTO: 24.6 % (ref 40–54)
HGB BLD-MCNC: 7.8 GM/DL (ref 14–18)
IMM GRANULOCYTES # BLD AUTO: 0.02 K/UL (ref 0–0.04)
IMM GRANULOCYTES NFR BLD AUTO: 0.2 % (ref 0–0.5)
INR PPP: 1.6 (ref 0.8–1.2)
LDH SERPL-CCNC: 181 U/L (ref 110–260)
LYMPHOCYTES # BLD AUTO: 1.99 K/UL (ref 1–4.8)
MAGNESIUM SERPL-MCNC: 2.2 MG/DL (ref 1.6–2.6)
MCH RBC QN AUTO: 28.7 PG (ref 27–31)
MCHC RBC AUTO-ENTMCNC: 31.7 G/DL (ref 32–36)
MCV RBC AUTO: 90 FL (ref 82–98)
NUCLEATED RBC (/100WBC) (OHS): 0 /100 WBC
OHS QRS DURATION: 136 MS
OHS QTC CALCULATION: 500 MS
PHOSPHATE SERPL-MCNC: 2.3 MG/DL (ref 2.7–4.5)
PLATELET # BLD AUTO: 232 K/UL (ref 150–450)
PMV BLD AUTO: 10.7 FL (ref 9.2–12.9)
POCT GLUCOSE: 138 MG/DL (ref 70–110)
POCT GLUCOSE: 170 MG/DL (ref 70–110)
POTASSIUM SERPL-SCNC: 4 MMOL/L (ref 3.5–5.1)
PREALB SERPL-MCNC: 7 MG/DL (ref 20–43)
PROT SERPL-MCNC: 8.4 GM/DL (ref 6–8.4)
PROTHROMBIN TIME: 16.5 SECONDS (ref 9–12.5)
RBC # BLD AUTO: 2.72 M/UL (ref 4.6–6.2)
RELATIVE EOSINOPHIL (OHS): 2.5 %
RELATIVE LYMPHOCYTE (OHS): 22.9 % (ref 18–48)
RELATIVE MONOCYTE (OHS): 11.7 % (ref 4–15)
RELATIVE NEUTROPHIL (OHS): 62 % (ref 38–73)
SODIUM SERPL-SCNC: 134 MMOL/L (ref 136–145)
WBC # BLD AUTO: 8.69 K/UL (ref 3.9–12.7)

## 2025-06-30 PROCEDURE — 25000003 PHARM REV CODE 250: Performed by: BEHAVIOR TECHNICIAN

## 2025-06-30 PROCEDURE — 63600175 PHARM REV CODE 636 W HCPCS: Performed by: INTERNAL MEDICINE

## 2025-06-30 PROCEDURE — 82248 BILIRUBIN DIRECT: CPT | Performed by: STUDENT IN AN ORGANIZED HEALTH CARE EDUCATION/TRAINING PROGRAM

## 2025-06-30 PROCEDURE — 85730 THROMBOPLASTIN TIME PARTIAL: CPT | Performed by: STUDENT IN AN ORGANIZED HEALTH CARE EDUCATION/TRAINING PROGRAM

## 2025-06-30 PROCEDURE — 25500020 PHARM REV CODE 255: Performed by: INTERNAL MEDICINE

## 2025-06-30 PROCEDURE — 86140 C-REACTIVE PROTEIN: CPT | Performed by: STUDENT IN AN ORGANIZED HEALTH CARE EDUCATION/TRAINING PROGRAM

## 2025-06-30 PROCEDURE — 84100 ASSAY OF PHOSPHORUS: CPT | Performed by: STUDENT IN AN ORGANIZED HEALTH CARE EDUCATION/TRAINING PROGRAM

## 2025-06-30 PROCEDURE — 93010 ELECTROCARDIOGRAM REPORT: CPT | Mod: ,,, | Performed by: INTERNAL MEDICINE

## 2025-06-30 PROCEDURE — 93005 ELECTROCARDIOGRAM TRACING: CPT

## 2025-06-30 PROCEDURE — 25000003 PHARM REV CODE 250: Performed by: INTERNAL MEDICINE

## 2025-06-30 PROCEDURE — 36415 COLL VENOUS BLD VENIPUNCTURE: CPT | Performed by: STUDENT IN AN ORGANIZED HEALTH CARE EDUCATION/TRAINING PROGRAM

## 2025-06-30 PROCEDURE — 36573 INSJ PICC RS&I 5 YR+: CPT

## 2025-06-30 PROCEDURE — 85610 PROTHROMBIN TIME: CPT | Performed by: STUDENT IN AN ORGANIZED HEALTH CARE EDUCATION/TRAINING PROGRAM

## 2025-06-30 PROCEDURE — 94761 N-INVAS EAR/PLS OXIMETRY MLT: CPT

## 2025-06-30 PROCEDURE — 76937 US GUIDE VASCULAR ACCESS: CPT

## 2025-06-30 PROCEDURE — 80053 COMPREHEN METABOLIC PANEL: CPT | Performed by: BEHAVIOR TECHNICIAN

## 2025-06-30 PROCEDURE — 25000003 PHARM REV CODE 250: Performed by: STUDENT IN AN ORGANIZED HEALTH CARE EDUCATION/TRAINING PROGRAM

## 2025-06-30 PROCEDURE — 97535 SELF CARE MNGMENT TRAINING: CPT

## 2025-06-30 PROCEDURE — 83735 ASSAY OF MAGNESIUM: CPT | Performed by: STUDENT IN AN ORGANIZED HEALTH CARE EDUCATION/TRAINING PROGRAM

## 2025-06-30 PROCEDURE — 83615 LACTATE (LD) (LDH) ENZYME: CPT | Performed by: STUDENT IN AN ORGANIZED HEALTH CARE EDUCATION/TRAINING PROGRAM

## 2025-06-30 PROCEDURE — 93750 INTERROGATION VAD IN PERSON: CPT | Mod: ,,, | Performed by: INTERNAL MEDICINE

## 2025-06-30 PROCEDURE — 99233 SBSQ HOSP IP/OBS HIGH 50: CPT | Mod: ,,, | Performed by: INTERNAL MEDICINE

## 2025-06-30 PROCEDURE — 63600175 PHARM REV CODE 636 W HCPCS

## 2025-06-30 PROCEDURE — 83880 ASSAY OF NATRIURETIC PEPTIDE: CPT | Performed by: STUDENT IN AN ORGANIZED HEALTH CARE EDUCATION/TRAINING PROGRAM

## 2025-06-30 PROCEDURE — 63600175 PHARM REV CODE 636 W HCPCS: Performed by: STUDENT IN AN ORGANIZED HEALTH CARE EDUCATION/TRAINING PROGRAM

## 2025-06-30 PROCEDURE — 85025 COMPLETE CBC W/AUTO DIFF WBC: CPT | Performed by: BEHAVIOR TECHNICIAN

## 2025-06-30 PROCEDURE — 99232 SBSQ HOSP IP/OBS MODERATE 35: CPT | Mod: ,,, | Performed by: PHYSICIAN ASSISTANT

## 2025-06-30 PROCEDURE — 99024 POSTOP FOLLOW-UP VISIT: CPT | Mod: ,,, | Performed by: PODIATRIST

## 2025-06-30 PROCEDURE — 97530 THERAPEUTIC ACTIVITIES: CPT

## 2025-06-30 PROCEDURE — 27000248 HC VAD-ADDITIONAL DAY

## 2025-06-30 PROCEDURE — 84134 ASSAY OF PREALBUMIN: CPT | Performed by: STUDENT IN AN ORGANIZED HEALTH CARE EDUCATION/TRAINING PROGRAM

## 2025-06-30 PROCEDURE — C1751 CATH, INF, PER/CENT/MIDLINE: HCPCS

## 2025-06-30 PROCEDURE — 02HV33Z INSERTION OF INFUSION DEVICE INTO SUPERIOR VENA CAVA, PERCUTANEOUS APPROACH: ICD-10-PCS | Performed by: INTERNAL MEDICINE

## 2025-06-30 RX ORDER — WARFARIN SODIUM 5 MG/1
TABLET ORAL
Qty: 60 TABLET | Refills: 11 | Status: SHIPPED | OUTPATIENT
Start: 2025-06-30

## 2025-06-30 RX ORDER — INSULIN GLARGINE 100 [IU]/ML
11 INJECTION, SOLUTION SUBCUTANEOUS DAILY
Qty: 3 ML | Refills: 3 | Status: CANCELLED | OUTPATIENT
Start: 2025-07-01 | End: 2026-07-01

## 2025-06-30 RX ORDER — LANCETS
1 EACH MISCELLANEOUS 4 TIMES DAILY
Qty: 100 EACH | Refills: 3 | Status: SHIPPED | OUTPATIENT
Start: 2025-06-30

## 2025-06-30 RX ORDER — INSULIN GLARGINE 100 [IU]/ML
9 INJECTION, SOLUTION SUBCUTANEOUS DAILY
Qty: 12 ML | Refills: 11 | Status: SHIPPED | OUTPATIENT
Start: 2025-07-01 | End: 2026-07-01

## 2025-06-30 RX ORDER — SODIUM CHLORIDE 0.9 % (FLUSH) 0.9 %
10 SYRINGE (ML) INJECTION EVERY 12 HOURS PRN
Status: DISCONTINUED | OUTPATIENT
Start: 2025-06-30 | End: 2025-06-30 | Stop reason: HOSPADM

## 2025-06-30 RX ORDER — FUROSEMIDE 10 MG/ML
80 INJECTION INTRAMUSCULAR; INTRAVENOUS ONCE
Status: COMPLETED | OUTPATIENT
Start: 2025-06-30 | End: 2025-06-30

## 2025-06-30 RX ORDER — PEN NEEDLE, DIABETIC 30 GX3/16"
1 NEEDLE, DISPOSABLE MISCELLANEOUS 4 TIMES DAILY
Qty: 100 EACH | Refills: 3 | Status: SHIPPED | OUTPATIENT
Start: 2025-06-30

## 2025-06-30 RX ORDER — LANCETS
EACH MISCELLANEOUS
Qty: 100 EACH | Refills: 0 | Status: SHIPPED | OUTPATIENT
Start: 2025-06-30

## 2025-06-30 RX ORDER — DEXTROSE 4 G
TABLET,CHEWABLE ORAL
Qty: 1 EACH | Refills: 0 | Status: SHIPPED | OUTPATIENT
Start: 2025-06-30

## 2025-06-30 RX ORDER — INSULIN GLARGINE 100 [IU]/ML
9 INJECTION, SOLUTION SUBCUTANEOUS DAILY
Qty: 3 ML | Refills: 3 | OUTPATIENT
Start: 2025-07-01 | End: 2026-07-01

## 2025-06-30 RX ORDER — INSULIN PUMP SYRINGE, 3 ML
EACH MISCELLANEOUS
Qty: 1 EACH | Refills: 0 | Status: SHIPPED | OUTPATIENT
Start: 2025-06-30 | End: 2026-06-30

## 2025-06-30 RX ORDER — LANOLIN ALCOHOL/MO/W.PET/CERES
1 CREAM (GRAM) TOPICAL DAILY
Qty: 90 TABLET | Refills: 11 | Status: SHIPPED | OUTPATIENT
Start: 2025-06-30

## 2025-06-30 RX ADMIN — ATORVASTATIN CALCIUM 80 MG: 40 TABLET, FILM COATED ORAL at 08:06

## 2025-06-30 RX ADMIN — INSULIN ASPART 3 UNITS: 100 INJECTION, SOLUTION INTRAVENOUS; SUBCUTANEOUS at 08:06

## 2025-06-30 RX ADMIN — FUROSEMIDE 80 MG: 10 INJECTION, SOLUTION INTRAVENOUS at 10:06

## 2025-06-30 RX ADMIN — AMLODIPINE BESYLATE 10 MG: 10 TABLET ORAL at 08:06

## 2025-06-30 RX ADMIN — MAGNESIUM SULFATE HEPTAHYDRATE 2 G: 40 INJECTION, SOLUTION INTRAVENOUS at 01:06

## 2025-06-30 RX ADMIN — INSULIN ASPART 3 UNITS: 100 INJECTION, SOLUTION INTRAVENOUS; SUBCUTANEOUS at 12:06

## 2025-06-30 RX ADMIN — INSULIN GLARGINE 11 UNITS: 100 INJECTION, SOLUTION SUBCUTANEOUS at 08:06

## 2025-06-30 RX ADMIN — PIPERACILLIN SODIUM AND TAZOBACTAM SODIUM 4.5 G: 4; .5 INJECTION, POWDER, FOR SOLUTION INTRAVENOUS at 11:06

## 2025-06-30 RX ADMIN — PIPERACILLIN SODIUM AND TAZOBACTAM SODIUM 4.5 G: 4; .5 INJECTION, POWDER, FOR SOLUTION INTRAVENOUS at 04:06

## 2025-06-30 RX ADMIN — SPIRONOLACTONE 25 MG: 25 TABLET, FILM COATED ORAL at 08:06

## 2025-06-30 RX ADMIN — HUMAN ALBUMIN MICROSPHERES AND PERFLUTREN 0.11 MG: 10; .22 INJECTION, SOLUTION INTRAVENOUS at 02:06

## 2025-06-30 NOTE — ASSESSMENT & PLAN NOTE
Procedure: Device Interrogation Including analysis of device parameters  Current Settings: Ventricular Assist Device  INR 1.6, will continue coumadin  INR therapeutic, Continue coumadin  Review of device function is stable/unstable stable        6/30/2025    12:00 PM 6/30/2025     8:00 AM 6/30/2025     4:03 AM 6/30/2025    12:07 AM 6/29/2025     8:40 PM 6/29/2025     4:11 PM 6/29/2025    11:45 AM   TXP LVAD INTERROGATIONS   Type HeartMate3 HeartMate3 HeartMate3 HeartMate3 HeartMate3 HeartMate3 HeartMate3   Flow 4.5 4.4 4.3 4.3 4.4     Speed 5200 5200 5200 5200 5200 5200 5200   PI 6.1 5.8 6.4 6.3 5.3 4.4 4.4   Power (Edwards) 3.6 3.6 3.6 3.7 3.6 3.6 3.6   LSL 4800 4800 4800 4800 4800 4800 4800   Low Flow Alarm no no        Pulsatility Pulse Pulse Pulse Pulse Pulse No Pulse Intermittent pulse

## 2025-06-30 NOTE — CONSULTS
[facility] VASCULAR ACCESS NOTE       Bed:3098/3098 HAIM GODOY consulted for Central Access.     Double Lumen PICC Inserted to Right Brachial vein using Ultrasound Guidance and Modified Seldinger Technique.    Vessel image recorded and saved to EMR.    Indication: IV ABX      Length: 33 cm  Exposed: 0 cm  A/C: 30 cm    Lot #: [id number]  Attempts: 1      BECK POZO RN

## 2025-06-30 NOTE — PT/OT/SLP PROGRESS
Physical Therapy Co-Treatment    Patient Name: Rocco France   MRN: [id number]    Co-treatment performed for this visit due to patient need for two skilled therapists to ensure patient and staff safety and to accommodate for patient activity tolerance/pain management   Recommendations:     Discharge Recommendations: Low Intensity Therapy  Discharge Equipment Recommendations: walker, rolling  Barriers to Discharge: None  Safest Mobility Level with Nursing: Ambulation with CGA using RW    Assessment:     Rocco France is a 69 y.o. male admitted with a medical diagnosis of Type 2 diabetes mellitus. He presents with the following impairments/functional limitations: weakness, impaired endurance, impaired self care skills, impaired functional mobility, gait instability, impaired balance, decreased safety awareness, impaired cardiopulmonary response to activity, decreased lower extremity function, decreased coordination. Pt presenting with HOB elevated and agreeable to completing therapy session. Pt eager to complete functional mobility and UE/LE dressing in anticipation of upcoming discharge home this date. Pt continues to require limited assistance to complete all visualized functional mobility including extended ambulation trial and stair navigation. Pt educated on fall prevention strategies, safe stair techniques, management of energy expenditure, and importance of continued progressive mobility following discharge. Pt would continue to benefit from skilled acute PT in order to address current deficits and progress functional mobility.     Rehab Prognosis: Good; patient continues to benefit from acute skilled PT services to address these deficits and reach maximum level of function.  Recent Surgery: Procedure(s) (LRB):  AMPUTATION, TOE, THROUGH METATARSAL HEAD (Right) 8 Days Post-Op    Plan:     During this hospitalization, patient to be seen 4 x/week to address the identified rehab impairments via gait training,  "therapeutic activities, therapeutic exercises, neuromuscular re-education and progress toward the following goals:    Plan of Care Expires:  07/23/25    Subjective     Chief Complaint: None verbalized  Patient/Family Comments/Goals: "Can you help me get dressed?"  Pain/Comfort:  Pain Rating 1: 0/10    Objective:     Communicated with RN prior to session. Patient found HOB elevated with telemetry, PICC line, LVAD upon PT entry to room.     General Precautions: Standard, fall, aspiration, LVAD  Orthopedic Precautions: RLE weight bearing as tolerated  Braces:  (darco)    Functional Mobility:  Bed Mobility: Verbal cues for sequencing and technique  Supine to Sit: supervision  Transfers:    Sit to Stand: stand by assistance with rolling walker with cues for hand placement  Verbal cues for increased use of momentum, improved anterior weight shift, and increased BLE motor activation  Gait: Patient ambulated 120' x 2 with rolling walker and SBA-CGA.   Patient demonstrates occasional unsteady gait, decreased step length, narrow base of support, decreased weight shift, decreased foot clearance, ambulates outside TAMI of RW, flexed posture, decreased yayo, inconsistent bilateral foot placement, and antalgic gait.  Patient required cues for upright posture, gluteal activation, sequencing, rolling walker management, safe rolling walker usage, to ambulate within TAMI of RW, increased step size, foot placement, increased foot clearance, and increased TAMI.  All lines remained intact throughout ambulation trial, LVAD: Emergency bag present throughout ambulation trial out of room.  Stairs:  Pt ascended/descended 3 stair(s) with No Assistive Device with left handrail with Minimal Assistance.   Pt educated on safe stair techniques with focus on ascending with LLE leading and descending with RLE leading. Pt verbalized and demonstrated understanding.    AM-PAC 6 CLICK MOBILITY  Turning over in bed (including adjusting bedclothes, sheets " and blankets)?: 3  Sitting down on and standing up from a chair with arms (e.g., wheelchair, bedside commode, etc.): 3  Moving from lying on back to sitting on the side of the bed?: 3  Moving to and from a bed to a chair (including a wheelchair)?: 3  Need to walk in hospital room?: 3  Climbing 3-5 steps with a railing?: 3  Basic Mobility Total Score: 18     Therapeutic Activities and Exercises:  Patient educated on role of acute care PT and PT POC, safety while in hospital including calling nurse for mobility, and call light usage  Pt educated on the effects of bed rest and the importance of OOB activity. Pt encouraged to sit UIC majority of day as tolerated and continue daily ambulation with nursing assist. Pt verbalized understanding.  Pt educated on importance of maximal participation in therapy session in order to reduce negative effects of prolonged sedentary positioning.   Answered all questions within PT scope of practice and addressed functional mobility concerns.  Pt assisted with UE and LE dressing, LVAD management, and donning personal clothes during session.   Pt found on wall power / transitioned to battery power / left on battery power. No LVAD alarms sounded during session. RN aware.    Patient left sitting edge of bed with all lines intact, call button in reach, RN notified, and RN and family present.    GOALS:   Multidisciplinary Problems       Physical Therapy Goals          Problem: Physical Therapy    Goal Priority Disciplines Outcome Interventions   Physical Therapy Goal     PT, PT/OT Progressing    Description: Goals to be met by: 25     Patient will increase functional independence with mobility by performin. Sit to stand transfer with Modified Armstrong  2. Bed to chair transfer with Stand by assistance using LRAD or no AD  3. Gait  x 150 feet with Stand by assistance using LRAD or no AD.   4. Ascend/descend 3 stair with no Handrails Minimum assistance using LRAD or no AD. -  met 6/30/2025  Updated: contact guard assistance                         DME Justifications:  Patient demonstrates a mobility limitation that significantly impairs their ability to participate in one or more mobility related activities of daily living. Patient's mobility limitation cannot be sufficiently resolved with the use of a cane, but can be sufficiently resolved with the use of a rolling walker.The use of a rolling walker will considerably improve their ability to participate in MRADLs. Patient will use the walker on a regular basis at home.     Time Tracking:     PT Received On: 06/30/25  PT Start Time: 1345     PT Stop Time: 1411  PT Total Time (min): 26 min     Billable Minutes: Therapeutic Activity 26     Treatment Type: Treatment  PT/PTA: PT     Number of PTA visits since last PT visit: 0     06/30/2025

## 2025-06-30 NOTE — PROGRESS NOTES
[facility] - [facility] Stepdown  Podiatry  Progress Note    Patient Name: Rocco France  MRN: [id number]  Admission Date: 6/20/2025  Hospital Length of Stay: 10 days  Attending Physician: Deana Lake MD  Primary Care Provider: Jay Guardado DO     Subjective:     Interval History: NAEON. VSS, afebrile. Possible discharge today. Staff contacted at patient request. Dressing changed. No questions or complaints. Re educated patient on weightbearing status.     Follow-up For: Procedure(s) (LRB):  AMPUTATION, TOE, THROUGH METATARSAL HEAD (Right)    Post-Operative Day: 8 Days Post-Op    Scheduled Meds:   amLODIPine  10 mg Oral Daily    atorvastatin  80 mg Oral Daily    insulin aspart U-100  3 Units Subcutaneous TIDWM    insulin glargine U-100  11 Units Subcutaneous Daily    magnesium citrate  296 mL Oral Once    mirtazapine  30 mg Oral QHS    piperacillin-tazobactam (Zosyn) IV (PEDS and ADULTS) (extended infusion is not appropriate)  4.5 g Intravenous Q8H    polyethylene glycol  17 g Oral Daily    spironolactone  25 mg Oral Daily    warfarin  2.5 mg Oral Every Tues, Thurs, Sat, Sun    warfarin  5 mg Oral Every Mon, Wed, Fri     Continuous Infusions:  PRN Meds:  Current Facility-Administered Medications:     acetaminophen, 650 mg, Oral, Q6H PRN    bisacodyL, 10 mg, Rectal, Daily PRN    chlorproMAZINE, 25 mg, Oral, TID PRN    dextrose 50%, 12.5 g, Intravenous, PRN    dextrose 50%, 25 g, Intravenous, PRN    fentaNYL, 25 mcg, Intravenous, Q5 Min PRN    glucagon (human recombinant), 1 mg, Intramuscular, PRN    glucose, 16 g, Oral, PRN    glucose, 24 g, Oral, PRN    insulin aspart U-100, 0-5 Units, Subcutaneous, QID (AC + HS) PRN    ondansetron, 4 mg, Intravenous, Daily PRN    Flushing PICC/Midline Protocol, , , Until Discontinued **AND** sodium chloride 0.9%, 10 mL, Intravenous, Q12H PRN    Review of Systems  Objective:     Vital Signs (Most Recent):  Temp: 98.2 °F (36.8 °C) (06/30/25 1200)  Pulse: 85 (06/30/25  1200)  Resp: 18 (06/30/25 1200)  BP: 111/69 (06/30/25 1202)  SpO2: 99 % (06/30/25 1200) Vital Signs (24h Range):  Temp:  [97.9 °F (36.6 °C)-98.7 °F (37.1 °C)] 98.2 °F (36.8 °C)  Pulse:  [74-93] 85  Resp:  [16-18] 18  SpO2:  [99 %] 99 %  BP: ()/(0-77) 111/69     Weight: 75.5 kg (166 lb 7.2 oz)  Body mass index is 21.37 kg/m².    Foot Exam    General  Orientation: alert and oriented to person, place, and time   Affect: appropriate       Right Foot/Ankle     Inspection and Palpation  Ecchymosis: none  Tenderness: none   Swelling: (surgical site)  Skin Exam: skin changes; no drainage, no maceration, no ulcer and no erythema     Neurovascular  Dorsalis pedis: 1+  Posterior tibial: 1+  Saphenous nerve sensation: diminished  Tibial nerve sensation: diminished  Superficial peroneal nerve sensation: diminished  Deep peroneal nerve sensation: diminished  Sural nerve sensation: diminished    Comments  S/p partial 1st ray amputation with sutures intact, no maceration, no gapping, no drainage, no erythema. Normal post operative edema. No signs of infection or dehiscence.       Clinical media:      Laboratory:  All pertinent labs reviewed within the last 24 hours.    Diagnostic Results:  I have reviewed all pertinent imaging results/findings within the past 24 hours.  Assessment/Plan:     Endocrine  Diabetic ulcer of right foot  69 year old male with diabetic ulcer to the right hallux. Marked leukocytosis and E coli bacteremia.     Assessment:  Ulceration to the right hallux with depth to bone.  XR negative for convincing bony destruction.  Now status post partial 1st ray amputation, 6/22/25.    Plan:   - Patient seen and evaluated bedside by Podiatry.  Dressing change, plan of care discussed.  - Right foot dressed with light betadine paint, xeroform overlying incision, gauze padding, kerlix, ace to secure  - WBAT heel emphasis short distances in Darco shoe.    - PT/OT for mobility, appreciate recommendations.   - Abx per  ID, appreciate recommendations. Tracing pre-operative wound cultures, intra-op clean margins.   - Podiatry will follow. Please reach out with any questions.     Future discharge recs:  - Patient to follow up in Podiatry Clinic outpatient, Podiatry will arrange.  - SNF or HH to apply bandaging as described, x3/week: xeroform overlying incision, gauze padding, kerlix, ace to secure  - Abx plan per ID  - WBAT heel emphasis short distances in Darco shoe.   - Patient to keep dressings clean dry and intact  - Patient explained the importance of daily foot checks        Jeffery Park MD  Podiatry  [facility] - Cardiology Stepdown

## 2025-06-30 NOTE — PROGRESS NOTES
Rounded on patient at bedside. Patient is A&Ox 4.  LVAD history interrogated with no abnormal events noted.  LVAD numbers WNL compared to baseline. Pt denies any needs at this time.  Patient reported having  dressing supplies at home for discharge. Patient reported no equipment needs. Patient asked about bedside delivery, communicated to MD team. Encouraged pt to notify nurse if they have any questions, problems or concerns for LVAD coordinator.  Pt verbalized understanding and in agreement of plan. Plan for team to continue to round on patient.

## 2025-06-30 NOTE — PROGRESS NOTES
Discharge Note:    Per team, pt is to be dc today.    JULIO C contacted Kindra with [facility] who will be here around 1:30-2pm for education and to connect pt to home IV abx.  Meds to be delivered to pt's room.    JULIO C spoke with [facility] ([facility]) who advised that pt's rolling walker will be delivered to pt's bedside today.    JULIO C also contacted Lacy with [facility] who  f/up on referral to [facility].  Pt to be admitted tomorrow by Home Health.    JULIO C updated nursing. Pt's wife present for teaching and to provide ride home.  No other   Needs reported at this time.    [facility]:[phone]    [facility]:[phone]    [facility]: [phone]

## 2025-06-30 NOTE — ASSESSMENT & PLAN NOTE
Likely source for septic shock on presentation.  -podiatry seeing pt,  s/p amputation 6/22.   -Continue IV zosyn, Needs 6 week course of IV zosyn for the associtated enterococcus bacteremia.  -IVP lasix this am given JVD, restarted home torsemide for discharge.

## 2025-06-30 NOTE — PROCEDURES
"Rocco France is a 69 y.o. male patient.    Temp: 98.5 °F (36.9 °C) (06/30/25 0755)  Pulse: 83 (06/30/25 0755)  Resp: 18 (06/30/25 0755)  BP: 106/76 (06/30/25 0758)  SpO2: 99 % (06/30/25 0940)  Weight: 75.5 kg (166 lb 7.2 oz) (06/30/25 0730)  Height: 6' 2" (188 cm) (06/20/25 1510)    PICC  Date/Time: 6/30/2025 11:04 AM  Performed by: Flaquita Reyna RN  Assisting provider: Rafael Gutierrez RN  Consent Done: Yes  Time out: Immediately prior to procedure a time out was called to verify the correct patient, procedure, equipment, support staff and site/side marked as required  Indications: med administration  Anesthesia: local infiltration  Local anesthetic: lidocaine 1% without epinephrine  Anesthetic Total (mL): 3  Description of findings: PICC  Preparation: skin prepped with ChloraPrep  Skin prep agent dried: skin prep agent completely dried prior to procedure  Sterile barriers: all five maximum sterile barriers used - cap, mask, sterile gown, sterile gloves, and large sterile sheet  Hand hygiene: hand hygiene performed prior to central venous catheter insertion  Location details: right brachial  Catheter type: double lumen  Catheter size: 5 Fr  Catheter Length: 33cm    Ultrasound guidance: yes  Vessel Caliber: large and patent, compressibility normal  Needle advanced into vessel with real time Ultrasound guidance.  Guidewire confirmed in vessel.  Image recorded and saved.  Sterile sheath used.  Number of attempts: 1  Post-procedure: blood return through all ports, chlorhexidine patch and sterile dressing applied  Technical procedures used: 3CG  Estimated blood loss (mL): 0  Specimens: No  Implants: No  Assessment: placement verified by x-ray  Complications: none          Name ELVIA Gutierrez RN  6/30/2025    "

## 2025-06-30 NOTE — PT/OT/SLP PROGRESS
Occupational Therapy  Co- Treatment  PT present for co- treatment due to pt's multiple medical comorbidities and functional/cognition deficits requiring two skilled therapists to appropriately progress pt's musculoskeletal strength, neuromuscular control, and pain/ activity tolerance while taking into consideration medical acuity and pt safety.    Name: Rocco France  MRN: [id number]  Admitting Diagnosis:  Type 2 diabetes mellitus  8 Days Post-Op    Recommendations:     Discharge Recommendations: Low Intensity Therapy  Discharge Equipment Recommendations:  walker, rolling  Barriers to discharge:  None    Assessment:     Rocco France is a 69 y.o. male with a medical diagnosis of Type 2 diabetes mellitus.  He presents with no c/o pain however tolerated a dressing task and fxl mob to stairs. Performance deficits affecting function are weakness, impaired endurance, impaired self care skills, impaired balance.     Rehab Prognosis:  Good; patient would benefit from acute skilled OT services to address these deficits and reach maximum level of function.       Plan:     Patient to be seen 4 x/week to address the above listed problems via self-care/home management, therapeutic exercises, therapeutic activities, neuromuscular re-education  Plan of Care Expires: 07/05/25  Plan of Care Reviewed with: patient    Subjective     Chief Complaint: None   Patient/Family Comments/goals: Pt. Reports he can get get dress because he is going home  Pain/Comfort:  Pain Rating 1: 0/10    Objective:     Communicated with: Nurse prior to session.  Patient found HOB elevated with telemetry, LVAD, peripheral IV upon OT entry to room.    General Precautions: Standard, LVAD, fall, aspiration    Orthopedic Precautions:RLE weight bearing as tolerated (R in shoe)  Braces:  (R darco shoe)  Respiratory Status: Room air     Occupational Performance:     Bed Mobility:    Patient completed Supine to Sit with supervision     Functional  Mobility/Transfers:  Patient completed Sit <> Stand Transfer with stand by assistance  with  rolling walker 2 trials at bed chair   Functional Mobility: Pt demonstrated functional mobility training to simulate household to/ from doorway with RW with SBA- CGA and no LOB and fair visual search and navigation strategies.   Additional gait training the hallway with PT (see PT  note)    Activities of Daily Living:  Upper Body Dressing: contact guard assistance don gown seated, doff gown and don personal shirt   Lower Body Dressing: stand by assistance to don jeans at EOB  Lower Body Dressing: contact guard assistance to bridge at bed level to don undergarments       [facility] 6 Click ADL: 22    Treatment & Education:  Pt educated on role of occupational therapy, POC, and safety during ADLs and functional mobility. Pt and OT discussed importance of safe, continued mobility to optimize daily living skills. Pt verbalized understanding. Pt given instruction to call for medical staff/nurse for assistance.     Patient left sitting edge of bed with all lines intact and call button in reach and family and nurse at bedside     GOALS:   Multidisciplinary Problems       Occupational Therapy Goals          Problem: Occupational Therapy    Goal Priority Disciplines Outcome Interventions   Occupational Therapy Goal     OT, PT/OT Progressing    Description: Goals to be met by: 7/5/25     Patient will increase functional independence with ADLs by performing:    LE Dressing with Modified [place].  Grooming while standing at sink with Modified [place].  Toileting from toilet with Modified [place] for hygiene and clothing management.   Supine to sit with Modified [place].  Toilet transfer to toilet with Modified [place].                         DME Justifications:  No DME recommended requiring DME justifications    Time Tracking:     OT Date of Treatment: 06/30/25  OT Start Time: 1347  OT Stop Time: 1410  OT Total Time  (min): 23 min    Billable Minutes:Self Care/Home Management 23    OT/MADISON: OT          6/30/2025

## 2025-06-30 NOTE — PROGRESS NOTES
DISCHARGE NOTE:    Rocco France is a 69 y.o. male s/p HM3 lvad from 1.13.21 admitted for evaluation of generalized weakness and fever.     Past Medical History:   Diagnosis Date    Acute respiratory failure requiring reintubation 1/28/2022    CLARISSE (acute kidney injury) 1/11/2021    Diabetes mellitus     Kidney stones        Hospital Course: During his hospital stay Mr. France was evaluated by the ID team. Cultures demonstrated growth of enterococcus from his toe amputation site. Broad spectrum abx were narrowed to pip/tazo for estimated 6 week course (eot 7.31), with ID follow up.     His inr today is 1.6. Plan to continue his previous warfarin dosing at 5mg MWF; 2.5mg on other days.     Pharmacy Interventions/Recommendations:  1) INR Goal: 1.5-2 (h/o SDH)    2) Antiplatelet Agents: None    3) Heparin Bridging:  No bridge     4) INR Follow-Up/Discharge Needs:  Thursday    See list of discharge medication for dosing instructions.     Rocco France and his caregiver verbalized their understanding and had the opportunity to ask questions.      Discharge Medications:     Medication List        START taking these medications      D5W PgBk 100 mL with piperacillin-tazobactam 4.5 gram SolR 13.5 g  Inject 13.5 g into the vein once daily.     DEXCOM G7 SENSOR Esha  Generic drug: blood-glucose sensor  1 Device by Misc.(Non-Drug; Combo Route) route every 10 days.     insulin glargine U-100 (Lantus) 100 unit/mL (3 mL) Inpn pen  Inject 9 Units into the skin once daily.  Start taking on: July 1, 2025     SITagliptin phosphate 100 MG Tab  Commonly known as: JANUVIA  Take 1 tablet (100 mg total) by mouth once daily.            CHANGE how you take these medications      magnesium oxide 400 mg (241.3 mg magnesium) tablet  Commonly known as: MAG-OX  Take 1 tablet (400 mg total) by mouth once daily.  What changed: when to take this     warfarin 5 MG tablet  Commonly known as: COUMADIN  Take 5mg orally daily on Monday, Wednesday and  Fridays. Take 2.5 mg orally daily on other days.  What changed:   how much to take  how to take this  when to take this  additional instructions            CONTINUE taking these medications      amLODIPine 5 MG tablet  Commonly known as: NORVASC  Take 2 tablets (10 mg total) by mouth once daily.     atorvastatin 80 MG tablet  Commonly known as: LIPITOR  Take 1 tablet (80 mg total) by mouth once daily.     mirtazapine 30 MG tablet  Commonly known as: REMERON  Take 1 tablet by mouth in the evening     potassium chloride SA 20 MEQ tablet  Commonly known as: K-DUR,KLOR-CON  Take 1 tablet (20 mEq total) by mouth 2 (two) times daily.     solifenacin 5 MG tablet  Commonly known as: VESICARE     spironolactone 25 MG tablet  Commonly known as: ALDACTONE  Take 1 tablet (25 mg total) by mouth once daily.     torsemide 20 MG Tab  Commonly known as: DEMADEX  Take 2 tablets (40 mg total) by mouth 2 (two) times a day.            STOP taking these medications      digoxin 125 mcg tablet  Commonly known as: LANOXIN     docusate sodium 100 MG capsule  Commonly known as: COLACE     ferrous gluconate 324 MG tablet  Commonly known as: FERGON     pantoprazole 40 MG tablet  Commonly known as: PROTONIX               Where to Get Your Medications        These medications were sent to [facility] [address], [place] [address]      Hours: Always Open Phone: [phone]   Oxagen G7 SENSOR Esha  insulin glargine U-100 (Lantus) 100 unit/mL (3 mL) Inpn pen  magnesium oxide 400 mg (241.3 mg magnesium) tablet  SITagliptin phosphate 100 MG Tab  warfarin 5 MG tablet       Information about where to get these medications is not yet available    Ask your nurse or doctor about these medications  D5W PgBk 100 mL with piperacillin-tazobactam 4.5 gram SolR 13.5 g

## 2025-06-30 NOTE — ASSESSMENT & PLAN NOTE
-cont Zosyn for enterococcus/E coli bacteremia  -ID following, appreciate recs  -R toe osteomyelitis is most likely source   -Needs 6 week course of IV zosyn for the enterococcus bacteremia  - picc placement today. Ex wife helping with antibiotics.

## 2025-06-30 NOTE — PLAN OF CARE
Problem: Adult Inpatient Plan of Care  Goal: Plan of Care Review  Outcome: Progressing  Goal: Optimal Comfort and Wellbeing  Outcome: Progressing     Problem: Diabetes Comorbidity  Goal: Blood Glucose Level Within Targeted Range  Outcome: Progressing     Problem: Sepsis/Septic Shock  Goal: Absence of Bleeding  Outcome: Progressing  Goal: Blood Glucose Level Within Targeted Range  Outcome: Progressing     Problem: Ventricular Assist Device  Goal: Absence of Bleeding  Outcome: Progressing  Goal: Absence of Embolism Signs and Symptoms  Outcome: Progressing  Goal: Absence of Infection Signs and Symptoms  Outcome: Progressing     Problem: Skin Injury Risk Increased  Goal: Skin Health and Integrity  Outcome: Progressing     Problem: Wound  Goal: Absence of Infection Signs and Symptoms  Outcome: Progressing

## 2025-06-30 NOTE — PROGRESS NOTES
06/30/2025  Brenda Vo    Current provider:  Deana Lake MD    Device interrogation:      6/30/2025     8:00 AM 6/30/2025     4:03 AM 6/30/2025    12:07 AM 6/29/2025     8:40 PM 6/29/2025     4:11 PM 6/29/2025    11:45 AM 6/29/2025     7:45 AM   TXP LVAD INTERROGATIONS   Type HeartMate3 HeartMate3 HeartMate3 HeartMate3 HeartMate3 HeartMate3 HeartMate3   Flow 4.4 4.3 4.3 4.4      Speed 5200 5200 5200 5200 5200 5200 5200   PI 5.8 6.4 6.3 5.3 4.4 4.4 4.8   Power (Edwards) 3.6 3.6 3.7 3.6 3.6 3.6 3.6   LSL 4800 4800 4800 4800 4800 4800 4800   Low Flow Alarm no         Pulsatility Pulse Pulse Pulse Pulse No Pulse Intermittent pulse Intermittent pulse          Rounded on Rocco France to ensure all mechanical assist device settings (IABP or VAD) were appropriate and all parameters were within limits.  I was able to ensure all back up equipment was present, the staff had no issues, and the Perfusion Department daily rounding was complete.      For implantable VADs: Interrogation of Ventricular assist device was performed with analysis of device parameters and review of device function. I have personally reviewed the interrogation findings and agree with findings as stated.     In emergency, the nursing units have been notified to contact the perfusion department either by:  Calling [phone] from 630am to 4pm Mon thru Fri, utilizing the On-Call Finder functionality of Epic and searching for Perfusion, or by contacting the hospital  from 4pm to 630am and on weekends and asking to speak with the perfusionist on call.    12:08 PM

## 2025-06-30 NOTE — PROGRESS NOTES
[facility] - Cardiology Stepdown  Heart Transplant  Progress Note    Patient Name: Rocco France  MRN: [id number]  Admission Date: 6/20/2025  Hospital Length of Stay: 10 days  Attending Physician: Deana Lake MD  Primary Care Provider: Jay Guardado DO  Principal Problem:Type 2 diabetes mellitus    Subjective:   No new subjective & objective note has been filed under this hospital service since the last note was generated.    Assessment and Plan:     No notes on file    * Type 2 diabetes mellitus  Pt presenting with hyperglycemia of nearly 600. Pt dry on exam on AM eval, overnight initiated on diuresis with IVP, discontinued this morning.   D/w Endocrine, given downtrending glucose levels, will initiate scheduled insulin.   -po intake per SLP recommendations - diabetic diet ordered   -endocrine following, appreciate recs.   -  Lab Results   Component Value Date    HGBA1C 12.4 (H) 06/20/2025         LVAD (left ventricular assist device) present  Procedure: Device Interrogation Including analysis of device parameters  Current Settings: Ventricular Assist Device  INR 1.6, will continue coumadin  INR therapeutic, Continue coumadin  Review of device function is stable/unstable stable        6/30/2025    12:00 PM 6/30/2025     8:00 AM 6/30/2025     4:03 AM 6/30/2025    12:07 AM 6/29/2025     8:40 PM 6/29/2025     4:11 PM 6/29/2025    11:45 AM   TXP LVAD INTERROGATIONS   Type HeartMate3 HeartMate3 HeartMate3 HeartMate3 HeartMate3 HeartMate3 HeartMate3   Flow 4.5 4.4 4.3 4.3 4.4     Speed 5200 5200 5200 5200 5200 5200 5200   PI 6.1 5.8 6.4 6.3 5.3 4.4 4.4   Power (Edwards) 3.6 3.6 3.6 3.7 3.6 3.6 3.6   LSL 4800 4800 4800 4800 4800 4800 4800   Low Flow Alarm no no        Pulsatility Pulse Pulse Pulse Pulse Pulse No Pulse Intermittent pulse         Anticoagulated  -cont warfarin per INR   -pharmacy assisting with dosing     Acute osteomyelitis of toe, right  Likely source for septic shock on presentation.  -podiatry  seeing pt,  s/p amputation 6/22.   -Continue IV zosyn, Needs 6 week course of IV zosyn for the associtated enterococcus bacteremia.  -IVP lasix this am given JVD, restarted home torsemide for discharge.    Bacteremia  -cont Zosyn for enterococcus/E coli bacteremia  -ID following, appreciate recs  -R toe osteomyelitis is most likely source   -Needs 6 week course of IV zosyn for the enterococcus bacteremia  - picc placement today. Ex wife helping with antibiotics.     Shock  Pt initially presented with elevated lactic and elevated glucose, elevated serum osm but not quite to level of HHS. DKA workup negative. No pH for eval completed. Septic shock with source most likely R foot.     -endocrine following, appreciate recs  -shock resolved, now slightly volume up, will restart po diuresis on d/c.   -monitor renal function   -podiatry c/s, appreciate recs, OR 6/22 for partial amputation           Blanca Whitten MD  Heart Transplant  [facility] - Cardiology Stepdown

## 2025-06-30 NOTE — SUBJECTIVE & OBJECTIVE
"Interval HPI:   No acute events overnight. Patient in room 3098/3098 A. Blood glucose stable. BG at goal on current insulin regimen (SSI, prandial, and basal insulin ). Steroid use- None .   8 Days Post-Op  Renal function- Abnormal -    Vasopressors-  None     Diet Consistent Carbohydrate 2000 Calories (up to 75 gm per meal)     Eatin%  Nausea: No  Hypoglycemia and intervention: No  Fever: No  TPN and/or TF: No     /76 (BP Location: Left arm, Patient Position: Lying)   Pulse 83   Temp 98.5 °F (36.9 °C) (Oral)   Resp 18   Ht 6' 2" (1.88 m)   Wt 74.3 kg (163 lb 12.8 oz)   SpO2 99%   BMI 21.03 kg/m²     Labs Reviewed and Include    Recent Labs   Lab 25  0254      CALCIUM 8.0*   ALBUMIN 2.0*   PROT 8.4   *   K 4.0   CO2 19*      BUN 17   CREATININE 0.9   ALKPHOS 158*   ALT 8*   AST 19   BILITOT 0.8     Lab Results   Component Value Date    WBC 8.69 2025    HGB 7.8 (L) 2025    HCT 24.6 (L) 2025    MCV 90 2025     2025     No results for input(s): "TSH", "FREET4" in the last 168 hours.  Lab Results   Component Value Date    HGBA1C 12.4 (H) 2025       Nutritional status:   Body mass index is 21.03 kg/m².  Lab Results   Component Value Date    ALBUMIN 2.0 (L) 2025    ALBUMIN 2.1 (L) 2025    ALBUMIN 2.0 (L) 2025     Lab Results   Component Value Date    PREALBUMIN 7 (L) 2025    PREALBUMIN 6 (L) 2025    PREALBUMIN 5 (L) 2025       Estimated Creatinine Clearance: 81.4 mL/min (based on SCr of 0.9 mg/dL).    Accu-Checks  Recent Labs     25  1617 25  1645 25  0732 25  1125 25  1612 25  2136 25  0639 25  1134 25  1606 25  2100   POCTGLUCOSE 73 101 91 203* 162* 185* 128* 158* 127* 163*       Current Medications and/or Treatments Impacting Glycemic Control  Immunotherapy:    Immunosuppressants       None          Steroids:   Hormones (From admission, " onward)      None          Pressors:    Autonomic Drugs (From admission, onward)      None          Hyperglycemia/Diabetes Medications:   Antihyperglycemics (From admission, onward)      Start     Stop Route Frequency Ordered    06/28/25 1130  insulin aspart U-100 pen 3 Units         -- SubQ 3 times daily with meals 06/28/25 0828    06/28/25 0900  insulin glargine U-100 (Lantus) pen 11 Units         -- SubQ Daily 06/28/25 0828    06/20/25 1449  insulin aspart U-100 pen 0-5 Units         -- SubQ Before meals & nightly PRN 06/20/25 1349

## 2025-06-30 NOTE — PLAN OF CARE
Pt free of falls and injury. Pt denies any pain or discomfort. BG monitored and no insulin needed. IV abx given. IV mag replaced. Pt AAOx4. Fall precautions remain in place. LVAD hum present and smooth. VAD numbers and dopplers WDL. DLES dressing CDI. Plan of care reviewed with pt.       Problem: Adult Inpatient Plan of Care  Goal: Plan of Care Review  Outcome: Progressing  Goal: Patient-Specific Goal (Individualized)  Outcome: Progressing  Goal: Optimal Comfort and Wellbeing  Outcome: Progressing     Problem: Diabetes Comorbidity  Goal: Blood Glucose Level Within Targeted Range  Outcome: Progressing     Problem: Sepsis/Septic Shock  Goal: Absence of Infection Signs and Symptoms  Outcome: Progressing     Problem: Ventricular Assist Device  Goal: Absence of Bleeding  Outcome: Progressing  Goal: Absence of Embolism Signs and Symptoms  Outcome: Progressing  Goal: Absence of Infection Signs and Symptoms  Outcome: Progressing     Problem: Skin Injury Risk Increased  Goal: Skin Health and Integrity  Outcome: Progressing     Problem: Wound  Goal: Skin Health and Integrity  Outcome: Progressing  Goal: Optimal Wound Healing  Outcome: Progressing

## 2025-06-30 NOTE — PROGRESS NOTES
06/29/2025  Alex Hutchins    Current provider:  Mike Chauhan MD    Device interrogation:      6/29/2025     8:40 PM 6/29/2025     4:11 PM 6/29/2025    11:45 AM 6/29/2025     7:45 AM 6/29/2025     4:00 AM 6/29/2025    12:20 AM 6/28/2025     7:26 PM   TXP LVAD INTERROGATIONS   Type HeartMate3 HeartMate3 HeartMate3 HeartMate3 HeartMate3 HeartMate3 HeartMate3   Flow 4.4    4.6 4.3 4.5   Speed 5200 5200 5200 5200 5200 5200 5200   PI 5.3 4.4 4.4 4.8 5.3 5.9 5.6   Power (Edwards) 3.6 3.6 3.6 3.6 3.6 3.6 3.6   LSL 4800 4800 4800 4800 4800 4800 4800   Low Flow Alarm     no no no   Pulsatility Pulse No Pulse Intermittent pulse Intermittent pulse Pulse  Pulse          Rounded on Rocco France to ensure all mechanical assist device settings (IABP or VAD) were appropriate and all parameters were within limits.  I was able to ensure all back up equipment was present, the staff had no issues, and the Perfusion Department daily rounding was complete.      For implantable VADs: Interrogation of Ventricular assist device was performed with analysis of device parameters and review of device function. I have personally reviewed the interrogation findings and agree with findings as stated.     In emergency, the nursing units have been notified to contact the perfusion department either by:  Calling [id number] from 630am to 4pm Mon thru Fri, utilizing the On-Call Finder functionality of Epic and searching for Perfusion, or by contacting the hospital  from 4pm to 630am and on weekends and asking to speak with the perfusionist on call.    9:01 PM

## 2025-06-30 NOTE — PROGRESS NOTES
06/29/25 2355        VAD 01/13/21 1211 Left ventricular assist device HeartMate 3   Placement Date/Time: 01/13/21 1211   Present Prior to Hospital Arrival?: No  Inserted by: MD  VAD Type: Left ventricular assist device  VAD Brand: HeartMate 3   Site Location Abdomen right   Site Assessment Clean;Dry;Intact   Driveline Exit Site 1   Driveline Assessment Free of Kinks;Intact;Modular cable Clean and Locked   Dressing Status Clean;Dry;Intact   Dressing Intervention Sterile dressing change   Performed By RN   Dressing Change Schedule Every 3 days   Dressing Change Due 07/02/25   Integrity dry;intact;no damage   Driveline [place] in use Duarte johnson   Condition CDI     LVAD DLES dressing changed under sterile technique using Daily VAD kit. DLES is a 1. Pt tolerated well, no bleeding noted, no active drainage noted. Dressing due to be changed 7/2/25.

## 2025-06-30 NOTE — ASSESSMENT & PLAN NOTE
Pt initially presented with elevated lactic and elevated glucose, elevated serum osm but not quite to level of HHS. DKA workup negative. No pH for eval completed. Septic shock with source most likely R foot.     -endocrine following, appreciate recs  -shock resolved, now slightly volume up, will restart po diuresis on d/c.   -monitor renal function   -podiatry c/s, appreciate recs, OR 6/22 for partial amputation

## 2025-06-30 NOTE — SUBJECTIVE & OBJECTIVE
Subjective:     Interval History: NAEON. VSS, afebrile. Possible discharge today. Staff contacted at patient request. Dressing changed. No questions or complaints. Re educated patient on weightbearing status.     Follow-up For: Procedure(s) (LRB):  AMPUTATION, TOE, THROUGH METATARSAL HEAD (Right)    Post-Operative Day: 8 Days Post-Op    Scheduled Meds:   amLODIPine  10 mg Oral Daily    atorvastatin  80 mg Oral Daily    insulin aspart U-100  3 Units Subcutaneous TIDWM    insulin glargine U-100  11 Units Subcutaneous Daily    magnesium citrate  296 mL Oral Once    mirtazapine  30 mg Oral QHS    piperacillin-tazobactam (Zosyn) IV (PEDS and ADULTS) (extended infusion is not appropriate)  4.5 g Intravenous Q8H    polyethylene glycol  17 g Oral Daily    spironolactone  25 mg Oral Daily    warfarin  2.5 mg Oral Every Tues, Thurs, Sat, Sun    warfarin  5 mg Oral Every Mon, Wed, Fri     Continuous Infusions:  PRN Meds:  Current Facility-Administered Medications:     acetaminophen, 650 mg, Oral, Q6H PRN    bisacodyL, 10 mg, Rectal, Daily PRN    chlorproMAZINE, 25 mg, Oral, TID PRN    dextrose 50%, 12.5 g, Intravenous, PRN    dextrose 50%, 25 g, Intravenous, PRN    fentaNYL, 25 mcg, Intravenous, Q5 Min PRN    glucagon (human recombinant), 1 mg, Intramuscular, PRN    glucose, 16 g, Oral, PRN    glucose, 24 g, Oral, PRN    insulin aspart U-100, 0-5 Units, Subcutaneous, QID (AC + HS) PRN    ondansetron, 4 mg, Intravenous, Daily PRN    Flushing PICC/Midline Protocol, , , Until Discontinued **AND** sodium chloride 0.9%, 10 mL, Intravenous, Q12H PRN    Review of Systems  Objective:     Vital Signs (Most Recent):  Temp: 98.2 °F (36.8 °C) (06/30/25 1200)  Pulse: 85 (06/30/25 1200)  Resp: 18 (06/30/25 1200)  BP: 111/69 (06/30/25 1202)  SpO2: 99 % (06/30/25 1200) Vital Signs (24h Range):  Temp:  [97.9 °F (36.6 °C)-98.7 °F (37.1 °C)] 98.2 °F (36.8 °C)  Pulse:  [74-93] 85  Resp:  [16-18] 18  SpO2:  [99 %] 99 %  BP: ()/(0-77)  111/69     Weight: 75.5 kg (166 lb 7.2 oz)  Body mass index is 21.37 kg/m².    Foot Exam    General  Orientation: alert and oriented to person, place, and time   Affect: appropriate       Right Foot/Ankle     Inspection and Palpation  Ecchymosis: none  Tenderness: none   Swelling: (surgical site)  Skin Exam: skin changes; no drainage, no maceration, no ulcer and no erythema     Neurovascular  Dorsalis pedis: 1+  Posterior tibial: 1+  Saphenous nerve sensation: diminished  Tibial nerve sensation: diminished  Superficial peroneal nerve sensation: diminished  Deep peroneal nerve sensation: diminished  Sural nerve sensation: diminished    Comments  S/p partial 1st ray amputation with sutures intact, no maceration, no gapping, no drainage, no erythema. Normal post operative edema. No signs of infection or dehiscence.       Clinical media:      Laboratory:  All pertinent labs reviewed within the last 24 hours.    Diagnostic Results:  I have reviewed all pertinent imaging results/findings within the past 24 hours.

## 2025-06-30 NOTE — ASSESSMENT & PLAN NOTE
Pt presenting with hyperglycemia of nearly 600. Pt dry on exam on AM eval, overnight initiated on diuresis with IVP, discontinued this morning.   D/w Endocrine, given downtrending glucose levels, will initiate scheduled insulin.   -po intake per SLP recommendations - diabetic diet ordered   -endocrine following, appreciate recs.   -  Lab Results   Component Value Date    HGBA1C 12.4 (H) 06/20/2025

## 2025-06-30 NOTE — ASSESSMENT & PLAN NOTE
BG goal 140-180  Type 2 DM. S/p R toe amputation on 6/22.  Blood sugars stable.    Plan:  Continue Lantus 11 units once daily   Continue Novolog 3 units TID with meals   Low Dose Correction Scale  BG monitoring ac/hs    ** Please call Endocrine for any BG related issues **      Lab Results   Component Value Date    HGBA1C 12.4 (H) 06/20/2025     Bedside nurse to instruct on insulin pen use and blood sugar monitor. Have patient administer own injections after education completed supervised by nurse.    Discharge plans:  Likely will d/c with MDI regimen given poorly controlled A1c. Please notify Endocrine prior to d/c for updated recs    Patient would benefit from Dexcom at discharge if insurance approved. Will need follow up in Endocrine clinic and outpatient DM education scheduled at time of discharge.

## 2025-06-30 NOTE — PROGRESS NOTES
"[facility] Step[facility]  Endocrinology  Progress Note    Admit Date: 2025     Reason for Consult: Management of T2DM, Hyperglycemia     Surgical Procedure and Date:  s/p right toe amputation 2025    Diabetes diagnosis year:     Home Diabetes Medications:  None currently     Lab Results   Component Value Date    HGBA1C 6.6 (H) 2023       How often checking glucose at home? 1x per week   BG readings on regimen: 120s  Hypoglycemia on the regimen?  N/A  Missed doses on regimen?  N/A     Diabetes Complications include:     Hyperglycemia    Complicating diabetes co morbidities:   HTN, CHF      HPI:   Patient is a 69 y.o. 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  He was admitted for syncope 2022 at which time he was found to have an evolving right cerebral acute subdural hematoma and acute basal cistern subarachnoid hemorrhage. Patient presented to outside hospital emergency room with generalized weakness.  Patient is a poor historian.  He reports falling 3 times at home and has had weakness over the past week.  EMS called, noted sugar greater than 500.  Upon arrival, oral temperature is 101.1° F. patient complaining of dysuria, inability to "hold his urine".  Patient states he saw a doctor today, unable to give a urine sample, but had his INR drawn which was greater than 4.5. Endocrinology consulted for management of T2DM.     Interval HPI:   No acute events overnight. Patient in room 3098/3098 A. Blood glucose stable. BG at goal on current insulin regimen (SSI, prandial, and basal insulin ). Steroid use- None .   8 Days Post-Op  Renal function- Abnormal -    Vasopressors-  None     Diet Consistent Carbohydrate 2000 Calories (up to 75 gm per meal)     Eatin%  Nausea: No  Hypoglycemia and intervention: No  Fever: No  TPN and/or TF: No     /76 (BP Location: Left arm, " "Patient Position: Lying)   Pulse 83   Temp 98.5 °F (36.9 °C) (Oral)   Resp 18   Ht 6' 2" (1.88 m)   Wt 74.3 kg (163 lb 12.8 oz)   SpO2 99%   BMI 21.03 kg/m²     Labs Reviewed and Include    Recent Labs   Lab 06/30/25  0254      CALCIUM 8.0*   ALBUMIN 2.0*   PROT 8.4   *   K 4.0   CO2 19*      BUN 17   CREATININE 0.9   ALKPHOS 158*   ALT 8*   AST 19   BILITOT 0.8     Lab Results   Component Value Date    WBC 8.69 06/30/2025    HGB 7.8 (L) 06/30/2025    HCT 24.6 (L) 06/30/2025    MCV 90 06/30/2025     06/30/2025     No results for input(s): "TSH", "FREET4" in the last 168 hours.  Lab Results   Component Value Date    HGBA1C 12.4 (H) 06/20/2025       Nutritional status:   Body mass index is 21.03 kg/m².  Lab Results   Component Value Date    ALBUMIN 2.0 (L) 06/30/2025    ALBUMIN 2.1 (L) 06/27/2025    ALBUMIN 2.0 (L) 06/25/2025     Lab Results   Component Value Date    PREALBUMIN 7 (L) 06/30/2025    PREALBUMIN 6 (L) 06/27/2025    PREALBUMIN 5 (L) 06/25/2025       Estimated Creatinine Clearance: 81.4 mL/min (based on SCr of 0.9 mg/dL).    Accu-Checks  Recent Labs     06/27/25  1617 06/27/25  1645 06/28/25  0732 06/28/25  1125 06/28/25  1612 06/28/25  2136 06/29/25  0639 06/29/25  1134 06/29/25  1606 06/29/25  2100   POCTGLUCOSE 73 101 91 203* 162* 185* 128* 158* 127* 163*       Current Medications and/or Treatments Impacting Glycemic Control  Immunotherapy:    Immunosuppressants       None          Steroids:   Hormones (From admission, onward)      None          Pressors:    Autonomic Drugs (From admission, onward)      None          Hyperglycemia/Diabetes Medications:   Antihyperglycemics (From admission, onward)      Start     Stop Route Frequency Ordered    06/28/25 1130  insulin aspart U-100 pen 3 Units         -- SubQ 3 times daily with meals 06/28/25 0828    06/28/25 0900  insulin glargine U-100 (Lantus) pen 11 Units         -- SubQ Daily 06/28/25 0828    06/20/25 1449  insulin " aspart U-100 pen 0-5 Units         -- SubQ Before meals & nightly PRN 06/20/25 7297            ASSESSMENT and PLAN    Cardiac/Vascular  Hyperlipidemia  May increase insulin resistance.         ID  Acute osteomyelitis of toe, right  S/p right toe amputation  Infection may elevate BG readings  On IV antibiotics  Managed per primary team  Avoid hypoglycemia        Endocrine  * Type 2 diabetes mellitus  BG goal 140-180  Type 2 DM. S/p R toe amputation on 6/22.  Blood sugars stable.    Plan:  Continue Lantus 11 units once daily   Continue Novolog 3 units TID with meals   Low Dose Correction Scale  BG monitoring ac/hs    ** Please call Endocrine for any BG related issues **      Lab Results   Component Value Date    HGBA1C 12.4 (H) 06/20/2025     Bedside nurse to instruct on insulin pen use and blood sugar monitor. Have patient administer own injections after education completed supervised by nurse.    Discharge plans:  Notified of potential discharge today.  Insulin needs have gone down dramatically in past few days.  Discussed with the patient and will attempt basal insulin only with DPP 4 inhibitor.    -start Lantus 9 units daily  -start Tradjenta or Januvia whichever insurance improved        Douglas Kim PA-C  Endocrinology  [facility]

## 2025-06-30 NOTE — NURSING
Kindra.NATHANAEL from [employer] at bedside teaching how to use PICC line and and how to administer Abx.

## 2025-07-01 ENCOUNTER — TELEPHONE (OUTPATIENT)
Dept: PODIATRY | Facility: CLINIC | Age: 70
End: 2025-07-01
Payer: MEDICARE

## 2025-07-01 ENCOUNTER — ANTI-COAG VISIT (OUTPATIENT)
Dept: CARDIOLOGY | Facility: CLINIC | Age: 70
End: 2025-07-01
Payer: MEDICARE

## 2025-07-01 DIAGNOSIS — Z95.811 LVAD (LEFT VENTRICULAR ASSIST DEVICE) PRESENT: Primary | ICD-10-CM

## 2025-07-01 NOTE — PROGRESS NOTES
[facility]  Hospital Discharge Follow-Up    Rocco France was recently discharged from an inpatient facility and is resuming care with the [facility].       Hospital Notes per Chart Review: During his hospital stay Mr. France was evaluated by the ID team. Cultures demonstrated growth of enterococcus from his toe amputation site. Broad spectrum abx were narrowed to pip/tazo for estimated 6 week course (eot 7.31), with ID follow up.      Anticoagulation Clinic Plan: Plan to continue his previous warfarin dosing at 5mg MWF; 2.5mg on other days.       Follow-Up Action Needed: none      Cassidy Snider, PharmD, BCPS  Clinical Pharmacist - [facility]  Phone: [phone] or Epic Secure Messaging

## 2025-07-01 NOTE — TELEPHONE ENCOUNTER
Called patient, no answer, left voicemail.  ----- Message from Jeffery Park sent at 6/30/2025  6:27 PM CDT -----  Regarding: Appointment  Good evening,    Could we schedule this patient for follow up with Dr. Elise next week for post operative check (toe amputation)?     Thanks!  Danilo

## 2025-07-01 NOTE — TELEPHONE ENCOUNTER
"Patient scheduled for 7/9 @1:15  ----- Message from Lucie Barber sent at 6/30/2025  6:31 PM CDT -----  Regarding: Follow up appointment  Hi,     This patient was taken to surgery by this provider while admitted to the hospital. They have now been discharged. Please schedule a follow-up Podiatry clinic appointment in 1-2 weeks with this provider, for "R partial 1st ray amp".    Thank you.     Lucie Barber DPM   Podiatric Medicine & Surgery   [facility]  " Dutasteride Pregnancy And Lactation Text: This medication is absolutely contraindicated in women, especially during pregnancy and breast feeding. Feminization of male fetuses is possible if taking while pregnant.

## 2025-07-01 NOTE — PROGRESS NOTES
Pt confirmed correct d/c dosing per calendar. Advised INR needed on 7/2, orders faxed to [facility] -- Fax: [phone]; for PT/INR draw on 7/2/25

## 2025-07-02 ENCOUNTER — ANTI-COAG VISIT (OUTPATIENT)
Dept: CARDIOLOGY | Facility: CLINIC | Age: 70
End: 2025-07-02
Payer: MEDICARE

## 2025-07-02 DIAGNOSIS — Z95.811 LVAD (LEFT VENTRICULAR ASSIST DEVICE) PRESENT: Primary | ICD-10-CM

## 2025-07-02 LAB — INR PPP: 1.5

## 2025-07-02 PROCEDURE — 93793 ANTICOAG MGMT PT WARFARIN: CPT | Mod: S$GLB,,,

## 2025-07-02 NOTE — PROGRESS NOTES
[facility]    07/02/2025    Rocco France (69 y.o.) is followed by the [email or web address].      Assessment/Plan:    Rocco France presents with a therapeutic INR. Goal INR: 1.5-2.0    Lab Results   Component Value Date    INR 1.5 07/02/2025    INR 1.6 (H) 06/30/2025    INR 1.8 (H) 06/29/2025    PROTIME 16.5 (H) 06/30/2025    PROTIME 18.5 (H) 06/29/2025    PROTIME 18.1 (H) 06/28/2025       Assessment of patient findings per MA/LPN and chart review:   The following significant findings were found:   none    Recommendation for patient's warfarin regimen:   No change was made to warfarin therapy during this visit and patient has been instructed to continue their current warfarin regimen.    Recommended repeat INR in 1 week      Cassidy Snider, PharmD, BCPS  Clinical Pharmacist - [email or web address]  Preferred Contact: Secure Messaging or In Basket Message

## 2025-07-06 LAB — MYCOBACTERIUM SPEC QL CULT: NORMAL

## 2025-07-07 LAB — FUNGUS SPEC CULT: NORMAL

## 2025-07-07 RX ORDER — SPIRONOLACTONE 25 MG/1
25 TABLET ORAL
Qty: 90 TABLET | Refills: 3 | Status: SHIPPED | OUTPATIENT
Start: 2025-07-07

## 2025-07-07 NOTE — LETTER
January 2, 2024        Teresa Mendoza  [address]  [address]  [facility] [facility] [address]  Phone: [phone]  Fax: [phone]             [id number]  [address]  [place] [address]  Phone: [phone]   Patient: Rocco France   MR Number: [id number]   YOB: 1955   Date of Visit: 12/7/2023       Dear Dr. Teresa Mendoza    Thank you for referring Rocco France to me for evaluation. Attached you will find relevant portions of my assessment and plan of care.    If you have questions, please do not hesitate to call me. I look forward to following Rocco France along with you.    Sincerely,    Deana Lake MD    Enclosure    If you would like to receive this communication electronically, please contact [email or web address] or [phone] to request [facility] access.    [facility] is a tool which provides read-only access to select patient information with whom you have a relationship. Its easy to use and provides real time access to review your patients record including encounter summaries, notes, results, and demographic information.    If you feel you have received this communication in error or would no longer like to receive these types of communications, please e-mail [email or web address]    Bed/Stretcher in lowest position, wheels locked, appropriate side rails in place/Call bell, personal items and telephone in reach/Instruct patient to call for assistance before getting out of bed/chair/stretcher/Non-slip footwear applied when patient is off stretcher/[place] to call system/Physically safe environment - no spills, clutter or unnecessary equipment/Purposeful proactive rounding/Room/bathroom lighting operational, light cord in reach

## 2025-07-08 ENCOUNTER — PATIENT MESSAGE (OUTPATIENT)
Dept: TRANSPLANT | Facility: CLINIC | Age: 70
End: 2025-07-08
Payer: MEDICARE

## 2025-07-08 NOTE — DISCHARGE SUMMARY
[facility] Step[facility]  Heart Transplant  Discharge Summary      Patient Name: Rocco France  MRN: [id number]  Admission Date: 6/20/2025  Hospital Length of Stay: 10 days  Discharge Date and Time: 07/08/2025 5:55 PM  Attending Physician: No att. providers found   Discharging Provider: Lynn Whitten MD  Primary Care Provider: Jay Guardado DO     HPI: No notes on file    Procedure(s) (LRB):  AMPUTATION, TOE, THROUGH METATARSAL HEAD (Right)     Hospital Course: 68 yo M with  type 2 diabetes, hypertension,stage D CHF due to NICM underwent HM3 implantation 1/13/2021 with sternal closure 1/14/2021.  He was admitted for syncope 1/27/2022 at which time he was found to have an evolving right cerebral acute subdural hematoma and acute basal cistern subarachnoid hemorrhage. He is now admitted for hypovolemia in setting of sepsis 2/2 Toe osteomyelitis. Pt was started on antibiotics empirically and given IVF. He was evaluated by Infectious disease and podiatry inpatient and is s/p toe amputation on 6/22 and kept on zosyn x 6 weeks. He was also followed by endocrinology inpatient due to uncontrolled blood glucose. Warfarin dosing was adjusted for therapeutic INR while inpatient as well. Pt was discharged with IV antibiotics and home health arranged in stable condition.      Goals of Care Treatment Preferences:  Code Status: Full Code    Health care agent: Meme France  Health care agent number: [phone]    Living Will: Yes     What is most important right now is to focus on spending time at home, remaining as independent as possible, improvement in condition., maintaining a good QoL for as long as possible.  Accordingly, we have decided that the best plan to meet the patient's goals includes continuing with treatment.      Consults (From admission, onward)          Status Ordering Provider     [facility]  Once        Provider:  (Not yet assigned)    Completed LYNN WHITTEN  "    Inpatient consult to Podiatry  Once        Provider:  (Not yet assigned)    Completed LYNN WOODRUFF     Inpatient consult to Infectious Diseases  Once        Provider:  (Not yet assigned)    Completed LYNN WOODRUFF     Inpatient consult to Registered Dietitian/Nutritionist  Once        Provider:  (Not yet assigned)    Completed BRANDI FERRER     Inpatient consult to Endocrinology  Once        Provider:  (Not yet assigned)    Completed BRANDI FERRER            Significant Diagnostic Studies: Labs: BMP: No results for input(s): "GLU", "NA", "K", "CL", "CO2", "BUN", "CREATININE", "CALCIUM", "MG" in the last 48 hours., CMP No results for input(s): "NA", "K", "CL", "CO2", "GLU", "BUN", "CREATININE", "CALCIUM", "PROT", "ALBUMIN", "BILITOT", "ALKPHOS", "AST", "ALT", "ANIONGAP", "ESTGFRAFRICA", "EGFRNONAA" in the last 48 hours., CBC No results for input(s): "WBC", "HGB", "HCT", "PLT" in the last 48 hours., and INR   Lab Results   Component Value Date    INR 1.5 07/02/2025    INR 1.6 (H) 06/30/2025    INR 1.8 (H) 06/29/2025    PROTIME 16.5 (H) 06/30/2025    PROTIME 18.5 (H) 06/29/2025    PROTIME 18.1 (H) 06/28/2025     Microbiology: Blood Culture   Lab Results   Component Value Date    LABBLOO No growth after 5 days. 09/14/2022    and Sputum Culture No results found for: "GSRESP", "RESPIRATORYC"  Radiology: X-Ray: of foot, c/w osteomyelitis    Pending Diagnostic Studies:       Procedure Component Value Units Date/Time    EKG 12-lead [[id number]]     Order Status: Sent Lab Status: No result     EKG 12-lead [[id number]]     Order Status: Sent Lab Status: No result     Osmolality, Serum [[id number]] Collected: 06/20/25 0932    Order Status: Sent Lab Status: No result     Specimen: Blood           Final Active Diagnoses:    Diagnosis Date Noted POA    PRINCIPAL PROBLEM:  Type 2 diabetes mellitus [E11.9] 11/17/2015 Yes    LVAD (left ventricular assist device) present [Z95.811] 01/13/2021 Not Applicable    " "Anticoagulated [Z79.01] 01/27/2021 Not Applicable    End stage heart failure [I50.84] 06/30/2025 Yes    Hypomagnesemia [E83.42] 06/30/2025 Yes    Acute osteomyelitis of toe, right [M86.171] 06/22/2025 Yes    Bacteremia [R78.81] 06/21/2025 Yes    Diabetic ulcer of right foot [E11.621, L97.519] 06/21/2025 Yes    Tobacco dependency [F17.200] 06/20/2025 Unknown    Shock [R57.9] 06/20/2025 Yes    Moderate malnutrition [E44.0] 06/20/2025 Yes    Chronic systolic congestive heart failure [I50.22]  Yes    Hyperlipidemia [E78.5] 11/17/2015 Yes    Essential hypertension [I10] 09/25/2015 Yes      Problems Resolved During this Admission:    Diagnosis Date Noted Date Resolved POA    Sepsis [A41.9] 06/20/2025 06/20/2025 Unknown      Discharged Condition: stable    Disposition: Home-Health Care Svc    Follow Up:    Patient Instructions:      WALKER FOR HOME USE     Order Specific Question Answer Comments   Type of Walker: Adult (5'4"-6'6")    With wheels? Yes    Height: 6' 2" (1.88 m)    Weight: 73.5 kg (162 lb 0.6 oz)    Length of need (1-99 months): 99    Does patient have medical equipment at home? cane, straight    Does patient have medical equipment at home? shower chair    Does patient have medical equipment at home? walker, standard    Please check all that apply: Patient's condition impairs ambulation.    Please check all that apply: Patient is unable to safely ambulate without equipment.      ACCEPT - Ambulatory referral/consult to Heart Failure Transitional Care Clinic   Standing Status: Future   Referral Priority: Routine Referral Type: Consultation   Referral Reason: Specialty Services Required   Requested Specialty: Cardiology   Number of Visits Requested: 1     Reason for not Ordering Smoking Cessation Referral     Order Specific Question Answer Comments   Reason for not ordering: Not medically appropriate at this time      Reason for not Prescribing Nicotine Replacement     Order Specific Question Answer Comments " "  Reason for not Prescribing: Not medically appropriate at this time      Medications:  Reconciled Home Medications:      Medication List        START taking these medications      * ACCU-CHEK GUIDE GLUCOSE METER Misc  Generic drug: blood-glucose meter  use to test blood glucose     * blood-glucose meter kit  To check BG 3 times daily, to use with insurance preferred meter     * ACCU-CHEK GUIDE TEST STRIPS Strp  Generic drug: blood sugar diagnostic  use to test blood glucose 4 (four) times daily.     * blood sugar diagnostic Strp  To check BG 3 times daily, to use with insurance preferred meter     * ACCU-CHEK SOFTCLIX LANCETS Misc  Generic drug: lancets  use to test blood glucosee 4 (four) times daily.     * lancets [facility] [place]  To check BG 3 times daily, to use with insurance preferred meter     D5W PgBk 100 mL with piperacillin-tazobactam 4.5 gram SolR 13.5 g  Inject 13.5 g into the vein once daily.     DEXCOM G7 SENSOR Esha  Generic drug: blood-glucose sensor  1 Device by Misc.(Non-Drug; Combo Route) route every 10 days.     JANUVIA 100 mg Tab  Generic drug: SITagliptin phosphate  Take 1 tablet (100 mg total) by mouth once daily.     LANTUS SOLOSTAR U-100 INSULIN 100 unit/mL (3 mL) Inpn pen  Generic drug: insulin glargine U-100 (Lantus)  Inject 9 Units into the skin once daily. (Discard pen 28 days after initial use)     EV PEN NEEDLE 32 gauge x 5/32" Ndle  Generic drug: pen needle, diabetic  1 each by Misc.(Non-Drug; Combo Route) route 4 (four) times daily.           * This list has 6 medication(s) that are the same as other medications prescribed for you. Read the directions carefully, and ask your doctor or other care provider to review them with you.                CHANGE how you take these medications      magnesium oxide 400 mg (241.3 mg magnesium) tablet  Commonly known as: MAG-OX  Take 1 tablet (400 mg total) by mouth once daily.  What changed: when to take this     warfarin 5 MG tablet  Commonly known as: " COUMADIN  Take 1 tablet (5mg) orally daily on Monday, Wednesday and Fridays. Take 1/2 tablet (2.5 mg) orally daily on other days.  What changed:   how much to take  how to take this  when to take this  additional instructions            CONTINUE taking these medications      amLODIPine 5 MG tablet  Commonly known as: NORVASC  Take 2 tablets (10 mg total) by mouth once daily.     atorvastatin 80 MG tablet  Commonly known as: LIPITOR  Take 1 tablet (80 mg total) by mouth once daily.     mirtazapine 30 MG tablet  Commonly known as: REMERON  Take 1 tablet by mouth in the evening     potassium chloride SA 20 MEQ tablet  Commonly known as: K-DUR,KLOR-CON  Take 1 tablet (20 mEq total) by mouth 2 (two) times daily.     solifenacin 5 MG tablet  Commonly known as: VESICARE  Take 5 mg by mouth once daily.     torsemide 20 MG Tab  Commonly known as: DEMADEX  Take 2 tablets (40 mg total) by mouth 2 (two) times a day.            STOP taking these medications      digoxin 125 mcg tablet  Commonly known as: LANOXIN     docusate sodium 100 MG capsule  Commonly known as: COLACE     ferrous gluconate 324 MG tablet  Commonly known as: FERGON     pantoprazole 40 MG tablet  Commonly known as: PROTONIX              Blanca Whitten MD  Heart Transplant  [facility] - Cardiology Stepdown

## 2025-07-08 NOTE — HOSPITAL COURSE
70 yo M with  type 2 diabetes, hypertension,stage D CHF due to NICM underwent HM3 implantation 1/13/2021 with sternal closure 1/14/2021.  He was admitted for syncope 1/27/2022 at which time he was found to have an evolving right cerebral acute subdural hematoma and acute basal cistern subarachnoid hemorrhage. He is now admitted for hypovolemia in setting of sepsis 2/2 Toe osteomyelitis. Pt was started on antibiotics empirically and given IVF. He was evaluated by Infectious disease and podiatry inpatient and is s/p toe amputation on 6/22 and kept on zosyn x 6 weeks. He was also followed by endocrinology inpatient due to uncontrolled blood glucose. Warfarin dosing was adjusted for therapeutic INR while inpatient as well. Pt was discharged with IV antibiotics and home health arranged in stable condition.

## 2025-07-08 NOTE — PHYSICIAN QUERY
Please specify the nutritional diagnosis associated with the below clinical findings. (Include all that apply).  Moderate protein calorie malnutrition

## 2025-07-09 ENCOUNTER — DOCUMENTATION ONLY (OUTPATIENT)
Dept: CARDIOTHORACIC SURGERY | Facility: CLINIC | Age: 70
End: 2025-07-09
Payer: MEDICARE

## 2025-07-09 ENCOUNTER — CLINICAL SUPPORT (OUTPATIENT)
Dept: TRANSPLANT | Facility: CLINIC | Age: 70
End: 2025-07-09
Payer: MEDICARE

## 2025-07-09 ENCOUNTER — HOSPITAL ENCOUNTER (OUTPATIENT)
Dept: CARDIOLOGY | Facility: HOSPITAL | Age: 70
Discharge: HOME | End: 2025-07-09
Attending: INTERNAL MEDICINE
Payer: MEDICARE

## 2025-07-09 ENCOUNTER — ANTI-COAG VISIT (OUTPATIENT)
Dept: CARDIOLOGY | Facility: CLINIC | Age: 70
End: 2025-07-09
Payer: MEDICARE

## 2025-07-09 ENCOUNTER — OFFICE VISIT (OUTPATIENT)
Dept: TRANSPLANT | Facility: CLINIC | Age: 70
End: 2025-07-09
Payer: MEDICARE

## 2025-07-09 ENCOUNTER — OFFICE VISIT (OUTPATIENT)
Dept: INFECTIOUS DISEASES | Facility: CLINIC | Age: 70
End: 2025-07-09
Payer: MEDICARE

## 2025-07-09 ENCOUNTER — DOCUMENTATION ONLY (OUTPATIENT)
Dept: CARDIOLOGY | Facility: CLINIC | Age: 70
End: 2025-07-09
Payer: MEDICARE

## 2025-07-09 ENCOUNTER — HOSPITAL ENCOUNTER (OUTPATIENT)
Dept: PULMONOLOGY | Facility: CLINIC | Age: 70
Discharge: HOME | End: 2025-07-09
Payer: MEDICARE

## 2025-07-09 ENCOUNTER — TELEPHONE (OUTPATIENT)
Dept: PODIATRY | Facility: CLINIC | Age: 70
End: 2025-07-09
Payer: MEDICARE

## 2025-07-09 VITALS — WEIGHT: 169 LBS | BODY MASS INDEX: 21.69 KG/M2 | HEIGHT: 74 IN

## 2025-07-09 VITALS
BODY MASS INDEX: 21.05 KG/M2 | SYSTOLIC BLOOD PRESSURE: 86 MMHG | HEART RATE: 82 BPM | TEMPERATURE: 97 F | HEIGHT: 74 IN | WEIGHT: 164 LBS

## 2025-07-09 VITALS — BODY MASS INDEX: 21.17 KG/M2 | HEART RATE: 81 BPM | HEIGHT: 74 IN | WEIGHT: 165 LBS

## 2025-07-09 DIAGNOSIS — Z95.811 LVAD (LEFT VENTRICULAR ASSIST DEVICE) PRESENT: ICD-10-CM

## 2025-07-09 DIAGNOSIS — Z95.811 LVAD (LEFT VENTRICULAR ASSIST DEVICE) PRESENT: Primary | ICD-10-CM

## 2025-07-09 DIAGNOSIS — E78.2 MIXED HYPERLIPIDEMIA: ICD-10-CM

## 2025-07-09 DIAGNOSIS — M86.171 ACUTE OSTEOMYELITIS OF TOE, RIGHT: ICD-10-CM

## 2025-07-09 DIAGNOSIS — I62.00 SUBDURAL HEMATOMA, NONTRAUMATIC: ICD-10-CM

## 2025-07-09 DIAGNOSIS — I10 ESSENTIAL HYPERTENSION: ICD-10-CM

## 2025-07-09 DIAGNOSIS — Z95.811 HEART REPLACED BY HEART ASSIST DEVICE: Primary | ICD-10-CM

## 2025-07-09 DIAGNOSIS — Z95.810 ICD (IMPLANTABLE CARDIOVERTER-DEFIBRILLATOR) IN PLACE: ICD-10-CM

## 2025-07-09 DIAGNOSIS — D50.0 IRON DEFICIENCY ANEMIA DUE TO CHRONIC BLOOD LOSS: ICD-10-CM

## 2025-07-09 DIAGNOSIS — I42.8 NICM (NONISCHEMIC CARDIOMYOPATHY): ICD-10-CM

## 2025-07-09 DIAGNOSIS — B96.20 E COLI BACTEREMIA: Primary | ICD-10-CM

## 2025-07-09 DIAGNOSIS — I50.42 CHRONIC COMBINED SYSTOLIC AND DIASTOLIC HEART FAILURE: ICD-10-CM

## 2025-07-09 DIAGNOSIS — A49.8 ENTEROCOCCUS FAECALIS INFECTION: ICD-10-CM

## 2025-07-09 DIAGNOSIS — R78.81 E COLI BACTEREMIA: Primary | ICD-10-CM

## 2025-07-09 LAB
AORTIC SIZE INDEX (SOV): 1.9 CM/M2
AORTIC SIZE INDEX: 1.6 CM/M2
ASCENDING AORTA: 3.1 CM
BSA FOR ECHO PROCEDURE: 1.98 M2
CV ECHO LV RWT: 0.25 CM
DOP CALC LVOT AREA: 4.2 CM2
DOP CALC LVOT DIAMETER: 2.3 CM
E WAVE DECELERATION TIME: 254 MS
E/A RATIO: 0.96
ECHO EF ESTIMATED: 22 %
ECHO LV POSTERIOR WALL: 0.8 CM (ref 0.6–1.1)
FRACTIONAL SHORTENING: 9.5 % (ref 28–44)
INTERVENTRICULAR SEPTUM: 0.7 CM (ref 0.6–1.1)
IVC DIAMETER: 1.79 CM
LA MAJOR: 5.8 CM
LA WIDTH: 3.4 CM
LEFT ATRIUM SIZE: 3.1 CM
LEFT INTERNAL DIMENSION IN SYSTOLE: 5.7 CM (ref 2.1–4)
LEFT VENTRICLE DIASTOLIC VOLUME INDEX: 101 ML/M2
LEFT VENTRICLE DIASTOLIC VOLUME: 202 ML
LEFT VENTRICLE MASS INDEX: 93.7 G/M2
LEFT VENTRICLE SYSTOLIC VOLUME INDEX: 79 ML/M2
LEFT VENTRICLE SYSTOLIC VOLUME: 158 ML
LEFT VENTRICULAR INTERNAL DIMENSION IN DIASTOLE: 6.3 CM (ref 3.5–6)
LEFT VENTRICULAR MASS: 187.4 G
LVAD BASE RATE: 5200 RPM
MV PEAK A VEL: 0.48 M/S
MV PEAK E VEL: 0.46 M/S
OHS CV RV/LV RATIO: 0.87 CM
OHS LV EJECTION FRACTION SIMPSONS BIPLANE MOD: 20 %
PISA TR MAX VEL: 2.6 M/S
RA MAJOR: 5.36 CM
RA PRESSURE ESTIMATED: 3 MMHG
RA WIDTH: 5.42 CM
RIGHT ATRIAL AREA: 24 CM2
RIGHT VENTRICLE DIASTOLIC BASEL DIMENSION: 5.5 CM
RV TB RVSP: 6 MMHG
RV TISSUE DOPPLER FREE WALL SYSTOLIC VELOCITY 1 (APICAL 4 CHAMBER VIEW): 7.41 CM/S
SINUS: 3.8 CM
STJ: 3 CM
TRICUSPID ANNULAR PLANE SYSTOLIC EXCURSION: 1 CM
TV PEAK GRADIENT: 26 MMHG
TV REST PULMONARY ARTERY PRESSURE: 30 MMHG
Z-SCORE OF LEFT VENTRICULAR DIMENSION IN END DIASTOLE: 0.78
Z-SCORE OF LEFT VENTRICULAR DIMENSION IN END SYSTOLE: 3.68

## 2025-07-09 PROCEDURE — 3046F HEMOGLOBIN A1C LEVEL >9.0%: CPT | Mod: CPTII,S$GLB,, | Performed by: INTERNAL MEDICINE

## 2025-07-09 PROCEDURE — 1111F DSCHRG MED/CURRENT MED MERGE: CPT | Mod: CPTII,S$GLB,, | Performed by: INTERNAL MEDICINE

## 2025-07-09 PROCEDURE — 99215 OFFICE O/P EST HI 40 MIN: CPT | Mod: S$GLB,,, | Performed by: INTERNAL MEDICINE

## 2025-07-09 PROCEDURE — 93306 TTE W/DOPPLER COMPLETE: CPT | Mod: 26,,, | Performed by: INTERNAL MEDICINE

## 2025-07-09 PROCEDURE — 93306 TTE W/DOPPLER COMPLETE: CPT

## 2025-07-09 PROCEDURE — 1101F PT FALLS ASSESS-DOCD LE1/YR: CPT | Mod: CPTII,S$GLB,, | Performed by: INTERNAL MEDICINE

## 2025-07-09 PROCEDURE — 99999 PR PBB SHADOW E&M-EST. PATIENT-LVL V: CPT | Mod: PBBFAC,,, | Performed by: INTERNAL MEDICINE

## 2025-07-09 PROCEDURE — 1126F AMNT PAIN NOTED NONE PRSNT: CPT | Mod: CPTII,S$GLB,, | Performed by: INTERNAL MEDICINE

## 2025-07-09 PROCEDURE — G2211 COMPLEX E/M VISIT ADD ON: HCPCS | Mod: S$GLB,,, | Performed by: INTERNAL MEDICINE

## 2025-07-09 PROCEDURE — 3288F FALL RISK ASSESSMENT DOCD: CPT | Mod: CPTII,S$GLB,, | Performed by: INTERNAL MEDICINE

## 2025-07-09 PROCEDURE — 99999 PR PBB SHADOW E&M-EST. PATIENT-LVL I: CPT | Mod: PBBFAC,,,

## 2025-07-09 PROCEDURE — 3008F BODY MASS INDEX DOCD: CPT | Mod: CPTII,S$GLB,, | Performed by: INTERNAL MEDICINE

## 2025-07-09 PROCEDURE — 99214 OFFICE O/P EST MOD 30 MIN: CPT | Mod: S$GLB,,, | Performed by: INTERNAL MEDICINE

## 2025-07-09 PROCEDURE — 99999 PR PBB SHADOW E&M-EST. PATIENT-LVL I: CPT | Mod: PBBFAC,,, | Performed by: INTERNAL MEDICINE

## 2025-07-09 RX ORDER — PIPERACILLIN SODIUM, TAZOBACTAM SODIUM 36; 4.5 G/152ML; G/152ML
INJECTION, POWDER, LYOPHILIZED, FOR SOLUTION INTRAVENOUS
COMMUNITY
Start: 2025-07-08

## 2025-07-09 NOTE — PROGRESS NOTES
Pt presented for 54 month follow-up and verbalized consent and willingness to continue to participate with the INTERMACS registry.      Patient completed the EQ-5D quality of life questionnaire, KCCQ, and the [employer] neurocognitive test in 110 seconds.

## 2025-07-09 NOTE — PROGRESS NOTES
[facility]    2025 2:04 PM    Assessment/Plan:    Patient presents today with subtherapeutic  INR.    Assessment of patient findings and chart review: no significant findings     Recommendation for patient's warfarin regimen: Lower dose today to 7.5mg then resume current maintenance dose    Recommend repeat INR Monday  _________________________________________________________________    Rocco ESTRADA Román (69 y.o.) is followed by the [facility].    Anticoagulation Summary  As of 2025      INR goal:  1.5-2.0   TTR:  57.9% (4.2 y)   INR used for dosin.3 (2025)   Warfarin maintenance plan:  5 mg (5 mg x 1) every Mon, Wed, Fri; 2.5 mg (5 mg x 0.5) all other days   Weekly warfarin total:  25 mg   Plan last modified:  Cassidy Snider, PharmD (2025)   Next INR check:  2025   Target end date:  --    Indications    LVAD (left ventricular assist device) present [Z95.811]                 Anticoagulation Episode Summary       INR check location:  --    Preferred lab:  --    Send INR reminders to:  DOREEN COUMADIN LVAD    Comments:  LVAD //[facility]: [phone]; Fax: [phone]//[facility]  [phone]  //[facility] ph [phone] dax-[phone]/ results Lab - [phone]          Anticoagulation Care Providers       Provider Role Specialty Phone number    Pete Garnett MD [facility] Cardiology [phone]

## 2025-07-09 NOTE — TELEPHONE ENCOUNTER
Per Dr. Mclaughlin she is not going to see the patient due to Dr. Elise doing the 1st ray amputation in the hospital.  I called the patient back with this information.  He is not pleased because they are from [place].  and don't even know where [facility] is.  I rescheduled the patient for 07/16/25.   Patient has no more questions at this time.     Dari

## 2025-07-09 NOTE — LETTER
July 9, 2025        Teresa Mendoza  [address]  [address]  [facility] [address]  Phone: [phone]  Fax: [phone]             [facility]  [address]  [place] [address]  Phone: [phone]   Patient: Rocco France   MR Number: [id number]   YOB: 1955   Date of Visit: 7/9/2025       Dear Dr. Teresa Mendoza    Thank you for referring Rocco France to me for evaluation. Attached you will find relevant portions of my assessment and plan of care.    If you have questions, please do not hesitate to call me. I look forward to following Rocco France along with you.    Sincerely,    Mike Chauhan MD    Enclosure    If you would like to receive this communication electronically, please contact [email or web address] or [phone] to request [employer] Link access.    [employer] Link is a tool which provides read-only access to select patient information with whom you have a relationship. Its easy to use and provides real time access to review your patients record including encounter summaries, notes, results, and demographic information.    If you feel you have received this communication in error or would no longer like to receive these types of communications, please e-mail [email or web address]

## 2025-07-09 NOTE — PROGRESS NOTES
"  Date of Implant with Heartmate 3 LVAD: 1/13/21    PATIENT ARRIVED IN CLINIC:  Wheelchair  Accompanied by: wife  Complaints/reason for visit today: routine    Vitals  Temperature, oral:   Temp Readings from Last 1 Encounters:   07/09/25 97.4 °F (36.3 °C) (Oral)     Blood Pressure:   BP Readings from Last 3 Encounters:   07/09/25 (!) 86/0   06/30/25 (!) 82/67   06/19/25 110/62        VAD Interrogation:      6/30/2025     3:55 PM 6/30/2025    12:00 PM 6/30/2025     8:00 AM   TXP JACEY INTERROGATIONS   Pulsatility Pulse Pulse Pulse     HCT:   Lab Results   Component Value Date    HCT 29.6 (L) 07/09/2025    HCT 37.0 (L) 01/09/2025    HCT 30 (L) 03/22/2022       VAD Assessment  Problems / Issues /Equipment Needs/ Alarms with VAD if any: None noted  VAD Sounds: HM3   VAD Binder With Patient: No  Reviewed VAD Numbers In Binder: No  Emergency Equipment With Patient: Yes   Equipment Needs: Yes Refer to  note if applicable.   It is medically necessary to ensure patient has properly functioning equipment and wearables to prevent infection, injury or death to patient.     DLES Assessment and Dressing Care:  Appearance of DLES: "1.5". No odor. Dressing changed this am. Pt states he doesn't have more drainage when he changes his dressing every 2 days.     Antibiotics: YES   Velour: No  Manual & Visual Inspection Of Driveline: No kinks or tears noted  Stabilization Device In Use: yes, [place] securement device    Heartmate 3 Module Cable:  No yellow exposed and Attempted to unscrew modular cable to ensure it will be able to come lose in the event we ever need to change the modular cable while patient held the driveline in place so it would not move. Modular cable connection able to be unscrewed and re-tightened. Instructed pt to perform this weekly.  Frequency of Dressing Changes: every other day & daily kit   Pt In Need Of Management Kits?:no   It is medically necessary to have VAD management kits in order to " prevent infection or to assist in the healing of an infected DLES.      Assessment/ Quality of Life Survery:   Complaints Of Nausea / Vomiting: None noted    Appearance and Frequency Of Stools: normal and formed without blood & daily  Color Of Urine: clear/yellow  Pain: NO  Coping/Depression/Anxiety: coping okay  Sleep Habits: 4-5 hrs /night  Sleep Aids: None noted  Showering: No  Self- Care I have no problems with self- care  Mobility I have some problems in walking about, toe amputation  Usual Activities I have some problems with performing my usual activities, toe amputation recently  Activity/Exercise: physical therapy and exercises   Driving: No.      Labs:    Chemistry        Component Value Date/Time     07/09/2025 0910     01/09/2025 0750    K 4.1 07/09/2025 0910    K 3.9 01/09/2025 0750     07/09/2025 0910     01/09/2025 0750    CO2 27 07/09/2025 0910    CO2 24 01/09/2025 0750    BUN 25 (H) 07/09/2025 0910    CREATININE 1.5 (H) 07/09/2025 0910     (H) 07/09/2025 0910     (H) 01/09/2025 0750        Component Value Date/Time    CALCIUM 9.4 07/09/2025 0910    CALCIUM 9.1 01/09/2025 0750    ALKPHOS 137 07/09/2025 0910    ALKPHOS 212 (H) 01/09/2025 0750    AST 17 07/09/2025 0910    AST 19 01/09/2025 0750    ALT 5 (L) 07/09/2025 0910    ALT 11 01/09/2025 0750    BILITOT 1.1 (H) 07/09/2025 0910    BILITOT 2.9 (H) 01/09/2025 0750    ESTGFRAFRICA 55.3 (A) 07/13/2022 0834    EGFRNONAA 47.8 (A) 07/13/2022 0834            Magnesium    Date Value Ref Range Status   07/09/2025 2.0 1.6 - 2.6 mg/dL Final       Lab Results   Component Value Date    WBC 8.31 07/09/2025    HGB 9.4 (L) 07/09/2025    HCT 29.6 (L) 07/09/2025    MCV 91 07/09/2025     07/09/2025       Lab Results   Component Value Date    INR 1.3 (H) 07/09/2025    INR 1.5 07/02/2025    INR 1.6 (H) 06/30/2025    PROTIME 14.3 (H) 07/09/2025    PROTIME 16.5 (H) 06/30/2025    PROTIME 18.5 (H) 06/29/2025       NT-proBNP    Date Value Ref Range Status   06/19/2025 3,655 (H) <125 pg/mL Final     BNP   Date Value Ref Range Status   07/09/2025 251 (H) 0 - 99 pg/mL Final     Comment:     Values of less than 100 pg/ml are consistent with non-CHF populations.    06/30/2025 1,038 (H) 0 - 99 pg/mL Final     Comment:     Values of less than 100 pg/ml are consistent with non-CHF populations.    06/27/2025 542 (H) 0 - 99 pg/mL Final     Comment:     Values of less than 100 pg/ml are consistent with non-CHF populations.        Lactate Dehydrogenase   Date Value Ref Range Status   07/09/2025 162 110 - 260 U/L Final   06/30/2025 181 110 - 260 U/L Final   06/29/2025 177 110 - 260 U/L Final     LD   Date Value Ref Range Status   01/09/2025 295 (H) 110 - 260 U/L Final     Comment:     Results are increased in hemolyzed samples.   10/10/2024 248 110 - 260 U/L Final     Comment:     Results are increased in hemolyzed samples.   06/12/2024 248 110 - 260 U/L Final     Comment:     Results are increased in hemolyzed samples.       Labs reviewed with patient: YES     Medication Reconciliation: per MA.  New Medication Detail Provided: yes  Coumadin Managed by: [facility]    Education: Reviewed driveline care, emergency procedures, how to change the controller, alarms with patient, as well as discussed how to page the VAD coordinator in case of an emergency.   Educated patient/family that chest compressions are allowed in the event they are needed.    Reminded patient/caregiver not to touch their face and to cover their mouth when they cough or sneeze.   Vaccines: Pt informed that we are encouraging all VAD patients to receive  vaccines and boosters. Informed pt that they can take tylenol but should avoid other NSAIDs.       Plans/Needs:   Echo completed today and reviewed.   ABX infusing via MICHE[facility]. EOD end of July.   Wife reports she is staying at his house with him right now to assist him with his abx.   [employer], pt states  they  are good.  P.T. once weekly and wife confirms he is doing his exercises daily.    Pt had podiatry appt today, but it was scheduled in [place] and pt/wife state they can not go to [place] and didn't realize it was in [place].  Dr. Tinsley called podiatry to get appt moved to [facility].  Podiatry will call pt to reschedule appt.   RTC in 3 months with lipids    Hurricane Season: Yes, discussed with patient: With hurricane season approaching, we want to make sure you are fully prepared for any emergency.  Should the National Weather Service or your local authorities recommend a voluntary or mandatory evacuation of your area, The VAD team requires you to evacuate to a safe place.  Remember, when it is a mandatory evacuation, traffic will become an issue for your limited battery power.  Therefore, we strongly urge you to evacuate early.      The VAD team advises you to have the following in place before hurricane season:  Have an evacuation plan in place including places to evacuate, names and phone numbers.  This information is required to be given to the VAD coordinator.  Have your VAD emergency contact numbers with you.  Make sure your prescriptions will not run out by the end of September.  Make sure you have enough medications, including pills, inhalers, patches,     etc. to take, should you be gone for more than 2 weeks.  Make sure ALL of your batteries are fully charged.  Bring enough dressing change supplies to last for at least 2 weeks.  Bring your VAD binder with you.  Make sure your binder is updated and complete with alarms reference card, patient hand book, emergency contact numbers, daily log sheets, etc.    If you do not have family or friends as an evacuation destination, we recommend evacuating to a safe area.   Do NOT evacuate to [facility].  The VAD team wants to stress the importance of planning for your evacuation in the event of a hurricane.  If you have any LVAD questions or issues, please contact the  LVAD coordinator.

## 2025-07-09 NOTE — TELEPHONE ENCOUNTER
Hello,     I just got a call from Dr. Fletcher.  She is treating this patient right now at [facility].  The patient has an appt today with Dr. Elise.  Per the wife she is unable to come to [place].  He would like to be seen by Dr. Mclaughlin only at [facility].  I will cancel the appt with Dr. Elise today but can you call him to schedule an appt for this patient.  He is a post-op patient.     Thank you,     Dari

## 2025-07-09 NOTE — PROGRESS NOTES
7/9- Pt questioned and confirmed correct dose. Pt drank Boost on 7/4/25 but denies all other changes.

## 2025-07-09 NOTE — PROGRESS NOTES
"Subjective:   Patient ID:  Rocco France is a 69 y.o. male who presents for LVAD followup visit.    Implant Date: 1/13/2021     Heartmate 3 RPM 5200     INR goal: 1.5-2.0  Bridge with Heparin   Antiplatelets:  stopped d/t SDH      HPI  Mr. Rocco France 70 yo male with stage D HFrEF due to NICM underwent HM3 implantation 1/13/2021 with sternal closure 1/14/2021.  He was admitted for syncope 1/27/2022 at which time he was found to have an evolving right cerebral acute subdural hematoma and acute basal cistern subarachnoid hemorrhage. Most recently, last week, he was admitted for hypovolemia in setting of sepsis 2/2 Toe osteomyelitis. Pt was started on antibiotics empirically and given IVF. He was evaluated by Infectious disease and podiatry inpatient and is s/p toe amputation on 6/22 and kept on zosyn x 6 weeks. He was also followed by endocrinology inpatient due to uncontrolled blood glucose. Warfarin dosing was adjusted for therapeutic INR while inpatient as well. Pt was discharged with IV antibiotics and home health in stable condition. Today comes for his post-hospital discharge follow-up clinic visit.   VAD speed is at 5200 rpm. No VAD alarms noted on interrogation occasional PI events . BP is 86 mm of Hg. DLES is a "1". INR is therapeutic at 2.3. LDH is at baseline.     2D Echo with CFD done today    Left Ventricle: The left ventricle is moderately dilated. Ventricular mass is normal. Normal wall thickness. Septal motion is consistent with bundle branch block. There is severely reduced systolic function with a visually estimated ejection fraction of 15 - 20%. Quantitated ejection fraction is 20%.    Right Ventricle: The right ventricle is moderately dilated measuring 5.5 cm. Systolic function is mildly reduced.    Right Atrium: The right atrium is dilated.    Aortic Valve: There is mild aortic valve sclerosis.    Mitral Valve: There is mild anterior leaflet sclerosis. Mildly thickened anterior leaflet. There " "is mild regurgitation.    Tricuspid Valve: There is moderate regurgitation.    IVC/SVC: Normal venous pressure at 3 mmHg.    LVAD: There is a Heartmate III LVAD running at 5,200 RPM. The aortic valve fully opens intermittently. The ventricular septum is at midline. Inflow cannula well seated at the apex.    Review of Systems   Constitutional: Negative. Negative for chills, decreased appetite, diaphoresis, fever, malaise/fatigue, night sweats, weight gain and weight loss.   Eyes: Negative.    Cardiovascular:  Negative for chest pain, claudication, cyanosis, dyspnea on exertion, irregular heartbeat, leg swelling, near-syncope, orthopnea, palpitations, paroxysmal nocturnal dyspnea and syncope.   Respiratory:  Negative for cough, hemoptysis and shortness of breath.    Endocrine: Negative.    Hematologic/Lymphatic: Negative.    Skin:  Negative for color change, dry skin and nail changes.   Musculoskeletal: Negative.    Gastrointestinal: Negative.    Genitourinary: Negative.    Neurological:  Negative for weakness.       Objective:   Blood pressure (!) 86/0, pulse 82, temperature 97.4 °F (36.3 °C), temperature source Oral, height 6' 2" (1.88 m), weight 74.4 kg (164 lb).body mass index is 21.06 kg/m².    Doppler: 86    Physical Exam  Vitals reviewed.   Constitutional:       Appearance: He is well-developed.      Comments: BP (!) 86/0 (BP Location: Left arm, Patient Position: Sitting)   Pulse 82   Temp 97.4 °F (36.3 °C) (Oral)   Ht 6' 2" (1.88 m)   Wt 74.4 kg (164 lb)   BMI 21.06 kg/m²      HENT:      Head: Normocephalic.   Neck:      Vascular: No carotid bruit or JVD.   Cardiovascular:      Heart sounds: No murmur heard.     Comments: Smooth VAD hum. DLES is "1"  Pulmonary:      Effort: Pulmonary effort is normal.      Breath sounds: Normal breath sounds.   Abdominal:      General: Bowel sounds are normal.      Palpations: Abdomen is soft.   Skin:     General: Skin is warm.   Neurological:      Mental Status: He is " alert.         Lab Results   Component Value Date    WBC 8.31 07/09/2025    HGB 9.4 (L) 07/09/2025    HCT 29.6 (L) 07/09/2025    MCV 91 07/09/2025     07/09/2025    CO2 27 07/09/2025    CREATININE 1.5 (H) 07/09/2025    CALCIUM 9.4 07/09/2025    ALBUMIN 2.8 (L) 07/09/2025    AST 17 07/09/2025     (H) 07/09/2025    ALT 5 (L) 07/09/2025     07/09/2025       Lab Results   Component Value Date    INR 1.3 (H) 07/09/2025    INR 1.5 07/02/2025    INR 1.6 (H) 06/30/2025    PROTIME 14.3 (H) 07/09/2025    PROTIME 16.5 (H) 06/30/2025    PROTIME 18.5 (H) 06/29/2025       NT-proBNP   Date Value Ref Range Status   06/19/2025 3,655 (H) <125 pg/mL Final     BNP   Date Value Ref Range Status   07/09/2025 251 (H) 0 - 99 pg/mL Final     Comment:     Values of less than 100 pg/ml are consistent with non-CHF populations.    06/30/2025 1,038 (H) 0 - 99 pg/mL Final     Comment:     Values of less than 100 pg/ml are consistent with non-CHF populations.    06/27/2025 542 (H) 0 - 99 pg/mL Final     Comment:     Values of less than 100 pg/ml are consistent with non-CHF populations.        Lactate Dehydrogenase   Date Value Ref Range Status   07/09/2025 162 110 - 260 U/L Final   06/30/2025 181 110 - 260 U/L Final   06/29/2025 177 110 - 260 U/L Final     LD   Date Value Ref Range Status   01/09/2025 295 (H) 110 - 260 U/L Final     Comment:     Results are increased in hemolyzed samples.   10/10/2024 248 110 - 260 U/L Final     Comment:     Results are increased in hemolyzed samples.   06/12/2024 248 110 - 260 U/L Final     Comment:     Results are increased in hemolyzed samples.         Assessment:      1. Heart replaced by heart assist device    2. Chronic combined systolic and diastolic heart failure    3. NICM (nonischemic cardiomyopathy)    4. Essential hypertension    5. Acute osteomyelitis of toe, right    6. ICD (implantable cardioverter-defibrillator) in place    7. Iron deficiency anemia due to chronic blood loss     8. Subdural hematoma, nontraumatic    9. Mixed hyperlipidemia        Plan:   Patient is now NYHA class II. BP is controlled.  INR is slightly subtherapeutic.  I have reviewed his GDMT, he is not on a ARB/ACEI/MRA or beta blocker due to recent CLARISSE and low BP.   Patient was seen by ID Dr. Laureano Tinsley and has an appointment with Podiatry today  I have reviewed his Echo. No LVAD speed change needed.  VAD interrogation was performed in clinic  Any VAD management kits dispensed today medically necessary  Recommend 2 gram sodium restriction and 1500cc fluid restriction.  Encourage physical activity with graded exercise program.  Requested patient to weigh themselves daily, and to notify us if their weight increases by more than 3 lbs in 1 day or 5 lbs in 1 week.   Additionally, the patient has a patient centered goal of being able to get stronger     RTC in 3 months with labs      Listed for transplant: No. Implanted as DT.      UNOS Patient Status  Functional Status: 90% - Able to carry on normal activity: minor symptoms of disease  Physical Capacity: No Limitations  Working for Income: No  If no, reason not working: Demands of Treatment     Mike Chauhan MD

## 2025-07-09 NOTE — PROGRESS NOTES
[facility] ([facility]) Team notified of pt referral via [facility] (automated inbasket notification) .    Patient screened today7/9/25 by provider and RN for enrollment to program.      Pt was deemed not a candidate for enrollment at this time related to HTS/VAD patient.    Pt will require additional follow up planning per primary team.     If pt status, diagnosis, or treatment plan changes , please place AMB referral to [facility] ([id number]) for [facility] enrollment re-evalution.

## 2025-07-10 ENCOUNTER — TELEPHONE (OUTPATIENT)
Dept: INFECTIOUS DISEASES | Facility: CLINIC | Age: 70
End: 2025-07-10
Payer: MEDICARE

## 2025-07-10 LAB
AORTIC SIZE INDEX (SOV): 2 CM/M2
AORTIC SIZE INDEX: 1.7 CM/M2
ASCENDING AORTA: 3.3 CM
BSA FOR ECHO PROCEDURE: 1.98 M2
CV ECHO LV RWT: 0.24 CM
DOP CALC LVOT AREA: 3.8 CM2
DOP CALC LVOT DIAMETER: 2.2 CM
E WAVE DECELERATION TIME: 241 MS
E/A RATIO: 1.08
E/E' RATIO: 6 M/S
ECHO EF ESTIMATED: 24 %
ECHO LV POSTERIOR WALL: 0.8 CM (ref 0.6–1.1)
FRACTIONAL SHORTENING: 10.3 % (ref 28–44)
INTERVENTRICULAR SEPTUM: 0.9 CM (ref 0.6–1.1)
LA MAJOR: 5.3 CM
LA MINOR: 5.2 CM
LA WIDTH: 4.6 CM
LEFT ATRIUM SIZE: 3.5 CM
LEFT ATRIUM VOLUME INDEX MOD: 41 ML/M2
LEFT ATRIUM VOLUME INDEX: 36 ML/M2
LEFT ATRIUM VOLUME MOD: 81 ML
LEFT ATRIUM VOLUME: 72 CM3
LEFT INTERNAL DIMENSION IN SYSTOLE: 6.1 CM (ref 2.1–4)
LEFT VENTRICLE DIASTOLIC VOLUME INDEX: 121 ML/M2
LEFT VENTRICLE DIASTOLIC VOLUME: 242 ML
LEFT VENTRICLE MASS INDEX: 125 G/M2
LEFT VENTRICLE SYSTOLIC VOLUME INDEX: 92.5 ML/M2
LEFT VENTRICLE SYSTOLIC VOLUME: 185 ML
LEFT VENTRICULAR INTERNAL DIMENSION IN DIASTOLE: 6.8 CM (ref 3.5–6)
LEFT VENTRICULAR MASS: 249.9 G
LV LATERAL E/E' RATIO: 5.5
LV SEPTAL E/E' RATIO: 7.3
LVAD BASE RATE: 5200 RPM
MV PEAK A VEL: 0.61 M/S
MV PEAK E VEL: 0.66 M/S
OHS CV RV/LV RATIO: 0.62 CM
PISA TR MAX VEL: 3.9 M/S
RA MAJOR: 5.53 CM
RA PRESSURE ESTIMATED: 3 MMHG
RA WIDTH: 4.58 CM
RIGHT ATRIAL AREA: 26 CM2
RIGHT ATRIUM END SYSTOLIC VOLUME APICAL 4 CHAMBER INDEX BSA: 48 ML/M2
RIGHT ATRIUM VOLUME AREA LENGTH APICAL 4 CHAMBER: 96 ML
RIGHT VENTRICLE DIASTOLIC BASEL DIMENSION: 4.2 CM
RV TB RVSP: 7 MMHG
SINUS: 3.9 CM
STJ: 2.5 CM
TDI LATERAL: 0.12 M/S
TDI SEPTAL: 0.09 M/S
TDI: 0.11 M/S
TRICUSPID ANNULAR PLANE SYSTOLIC EXCURSION: 0.9 CM
TV PEAK GRADIENT: 28 MMHG
TV REST PULMONARY ARTERY PRESSURE: 64 MMHG
Z-SCORE OF LEFT VENTRICULAR DIMENSION IN END DIASTOLE: 1.54
Z-SCORE OF LEFT VENTRICULAR DIMENSION IN END SYSTOLE: 4.22

## 2025-07-10 NOTE — PROCEDURES
Rocco France is a 69 y.o.   male patient, who presents for a 6 minute walk test ordered by MD Chloe.  The diagnosis is Left Ventricular Assist Device.  The patient's BMI is 21.7 kg/m2.  Predicted distance (lower limit of normal) is 386.4 meters.      Test Results:    The test was completed without stopping.   The total time walked was 360 seconds.  During walking, the patient reported:  Dyspnea. The patient used a wheelchair  during testing.     07/10/2025---------Distance: 198.12 meters (650 feet)     O2 Sat % Supplemental Oxygen Heart Rate Blood Pressure Heath Scale   Pre-exercise  (Resting) 98 % Room Air 79 bpm 97/65 mmHg 3   During Exercise 99 % Room Air 33 bpm (!) 84/65 mmHg 4   Post-exercise  (Recovery) 99 % Room Air  48 bpm   mmHg       Recovery Time: 120 seconds   The patient walked the first 15 seconds in 8.06 seconds.  Performing nurse/tech: Kevin SERVIN      PREVIOUS STUDY:   The patient had a previous study.  06/12/2024---------Distance: 213.36 meters (700 feet)       O2 Sat % Supplemental Oxygen Heart Rate Blood Pressure Heath Scale   Pre-exercise  (Resting) 99 % Room Air 94 bpm 108/73 mmHg 0   During Exercise 100 % Room Air 62 bpm Unable to obtain    3   Post-exercise  (Recovery) 100 % Room Air  68 bpm          Recovery Time:    72 seconds               The patient walked the first 15 seconds in 5.99 seconds.    CLINICAL INTERPRETATION:  Six minute walk distance is 198.12 meters (650 feet) with moderate dyspnea.  During exercise, there was no significant desaturation while breathing room air.  Blood pressure remained stable and Heart rate decreased significantly with walking.  The patient did not report non-pulmonary symptoms during exercise.  The patient did complete the study, walking 360 seconds of the 360 second test.  Since the previous study in 6/2024, exercise capacity is unchanged.  Based upon age and body mass index, exercise capacity is less than predicted.

## 2025-07-10 NOTE — TELEPHONE ENCOUNTER
Spoke to Beba from [facility]. Beba reports they do not remove PICC lines.     ----- Message from Tali Tinsley MD sent at 7/10/2025 10:14 AM CDT -----  Can we see if home health can remove his PICC line on 7/31?

## 2025-07-10 NOTE — PROGRESS NOTES
Infectious Disease Clinic  [facility]    Subjective:      Patient ID:. Rocco France is a 69 y.o. male       Chief Complaint:   Chief Complaint   Patient presents with    Hospital Follow Up       History of Present Illness    A 69 y.o. male patient with NICM, CAD, combined HF, Afib, s/p ICD, s/p LVAD on 1/13/21, DM2, subdural hematoma with subarachnoid hemorrhage in 2022, and inpatient admission in June 2025 for management of E coli bacteremia and toe osteomyelitis due to Enterococcus who is seen for hospital follow up. Mr. France was discharged on a 6 week course of Zosyn with a scheduled end date of 7/31. He has been doing well with antibiotics and has not noted any adverse effects. His driveline exit site is clean and dry. He has not yet followed up with Podiatry.     Infection History:  October 4, 2021- DLI due to P aeruginosa, empiric Cipro plus doxycycline.  June 2025- E coli bacteremia and Enterococcus osteomyelitis of the toe s/p amputation, Zosyn x 6 weeks.    Review of Systems   Constitutional: Negative for chills, decreased appetite, fever, malaise/fatigue, night sweats, weight gain and weight loss.   HENT:  Negative for congestion, ear pain, hearing loss, hoarse voice, sore throat and tinnitus.    Eyes:  Negative for blurred vision, redness and visual disturbance.   Cardiovascular:  Negative for chest pain, leg swelling and palpitations.   Respiratory:  Negative for cough, hemoptysis, shortness of breath, sputum production and wheezing.    Hematologic/Lymphatic: Negative for adenopathy. Does not bruise/bleed easily.   Skin:  Negative for dry skin, itching, rash and suspicious lesions.   Musculoskeletal:  Negative for back pain, joint pain, myalgias and neck pain.   Gastrointestinal:  Negative for abdominal pain, constipation, diarrhea, heartburn, nausea and vomiting.   Genitourinary:  Negative for dysuria, flank pain, frequency, hematuria, hesitancy and urgency.   Neurological:  Negative  for dizziness, headaches, numbness, paresthesias and weakness.   Psychiatric/Behavioral:  Negative for depression and memory loss. The patient does not have insomnia and is not nervous/anxious.        Objective:     Physical Exam  Vitals and nursing note reviewed. Exam conducted with a chaperone present.   Constitutional:       Appearance: Normal appearance.   HENT:      Head: Normocephalic and atraumatic.   Eyes:      General: No scleral icterus.        Right eye: No discharge.         Left eye: No discharge.      Conjunctiva/sclera: Conjunctivae normal.   Cardiovascular:      Rate and Rhythm: Normal rate and regular rhythm.      Pulses: Normal pulses.      Heart sounds: No murmur heard.     Comments: VAD Humming sounds  Pulmonary:      Effort: Pulmonary effort is normal. No respiratory distress.      Breath sounds: Normal breath sounds. No stridor. No wheezing or rhonchi.   Abdominal:      General: There is no distension.      Palpations: Abdomen is soft.      Tenderness: There is no abdominal tenderness.      Comments: DLES covered with clean dressing.   Musculoskeletal:         General: Normal range of motion.   Skin:     General: Skin is warm and dry.   Neurological:      General: No focal deficit present.      Mental Status: He is alert and oriented to person, place, and time.         Assessment:       ICD-10-CM ICD-9-CM   1. E coli bacteremia  R78.81 790.7    B96.20 041.49   2. Acute osteomyelitis of toe, right  M86.171 730.07   3. Enterococcus faecalis infection  A49.8 041.04     Plan:   I have reviewed clinic notes, laboratory, imaging tests, and pathology from the referring provider and other providers, as indicated for this visit, with my interpretation as documented below.     E coli bacteremia on admission thought to be secondary to osteomyelitis of the right toe. Unfortunately, cultures from his toe amputation only positive for Enterococcus. However, highly likely that this was a polymicrobial  infection and only Enterococcus was cultured from bone. Clean margin was negative for acute osteomyelitis however he was noted to have purulence and gas at the surgical incision site.  Continue Zosyn for 6 weeks.   Continue OPAT labs and PICC care.  Called Podiatry department as patient scheduled for [facility] and they are unable to travel there. All appointments preferred in [facility]. Podiatry MA/Nurse to call back patient to rescheduled appointment at [facility].   Will arrange PICC removal with home health on 7/31.  Discussed with VAD Coordinator and Staff Physician.  RTC as needed.     The total time for evaluation and management services performed on 7/9/25 was 30 minutes

## 2025-07-14 ENCOUNTER — ANTI-COAG VISIT (OUTPATIENT)
Dept: CARDIOLOGY | Facility: CLINIC | Age: 70
End: 2025-07-14
Payer: MEDICARE

## 2025-07-14 DIAGNOSIS — Z95.811 LVAD (LEFT VENTRICULAR ASSIST DEVICE) PRESENT: Primary | ICD-10-CM

## 2025-07-14 LAB — INR PPP: 1.5

## 2025-07-14 PROCEDURE — 93793 ANTICOAG MGMT PT WARFARIN: CPT | Mod: S$GLB,,,

## 2025-07-14 NOTE — PROGRESS NOTES
[facility]    2025 11:00 AM    Assessment/Plan:    Patient presents today with therapeutic INR.    Assessment of patient findings and chart review: no significant findings     Recommendation for patient's warfarin regimen: Continue current maintenance dose    Recommend repeat INR Thursday  _________________________________________________________________    Rocco ESTRADA Román (69 y.o.) is followed by the [email or web address].    Anticoagulation Summary  As of 2025      INR goal:  1.5-2.0   TTR:  57.7% (4.2 y)   INR used for dosin.5 (2025)   Warfarin maintenance plan:  5 mg (5 mg x 1) every Mon, Wed, Fri; 2.5 mg (5 mg x 0.5) all other days   Weekly warfarin total:  25 mg   Plan last modified:  Cassidy Snider, PharmD (2025)   Next INR check:  2025   Target end date:  --    Indications    LVAD (left ventricular assist device) present [Z95.811]                 Anticoagulation Episode Summary       INR check location:  --    Preferred lab:  --    Send INR reminders to:  [facility] COUMADIN LVAD    Comments:  LVAD //Nursing Care [facility]: [phone]; Fax: [phone]//[facility] -[phone]  //[facility] ph [phone]/ results Lab - [phone]          Anticoagulation Care Providers       Provider Role Specialty Phone number    Pete Garnett MD [facility] Cardiology [phone]

## 2025-07-15 ENCOUNTER — TELEPHONE (OUTPATIENT)
Dept: INFECTIOUS DISEASES | Facility: CLINIC | Age: 70
End: 2025-07-15
Payer: MEDICARE

## 2025-07-15 ENCOUNTER — DOCUMENTATION ONLY (OUTPATIENT)
Dept: TRANSPLANT | Facility: CLINIC | Age: 70
End: 2025-07-15
Payer: MEDICARE

## 2025-07-15 LAB
EXT ALBUMIN: 2.5
EXT ALT: 11
EXT AST: 14
EXT BILIRUBIN TOTAL: 1
EXT BUN: 19
EXT CALCIUM: 9.2
EXT CHLORIDE: 100
EXT CREATININE: 1.4 MG/DL
EXT CRP: 21.5
EXT GLUCOSE: 175
EXT HEMATOCRIT: 26.4
EXT HEMOGLOBIN: 8.4
EXT PLATELETS: 202
EXT POTASSIUM: 3.6
EXT PROTEIN TOTAL: 10
EXT SODIUM: 138 MMOL/L
EXT WBC: 8.5

## 2025-07-15 NOTE — TELEPHONE ENCOUNTER
Spoke to the patient and informed him Dr. Tinsley reviewed his lab results, no changes needed. Told patient to call office if he has any questions or concerns. Patient understood.    ----- Message from Tali Tinsley MD sent at 7/14/2025  9:23 PM CDT -----  Results reviewed. No changes in management plan at this time.   ----- Message -----  From: Fatmata Montano  Sent: 7/14/2025   5:00 PM CDT  To: Tali Tinsley MD

## 2025-07-16 ENCOUNTER — OFFICE VISIT (OUTPATIENT)
Dept: PODIATRY | Facility: CLINIC | Age: 70
End: 2025-07-16
Payer: MEDICARE

## 2025-07-16 VITALS — SYSTOLIC BLOOD PRESSURE: 101 MMHG | DIASTOLIC BLOOD PRESSURE: 69 MMHG | HEART RATE: 83 BPM

## 2025-07-16 DIAGNOSIS — E11.42 DIABETIC POLYNEUROPATHY ASSOCIATED WITH TYPE 2 DIABETES MELLITUS: ICD-10-CM

## 2025-07-16 DIAGNOSIS — L97.512 DIABETIC ULCER OF TOE OF RIGHT FOOT ASSOCIATED WITH TYPE 2 DIABETES MELLITUS, WITH FAT LAYER EXPOSED: Primary | ICD-10-CM

## 2025-07-16 DIAGNOSIS — E11.621 DIABETIC ULCER OF TOE OF RIGHT FOOT ASSOCIATED WITH TYPE 2 DIABETES MELLITUS, WITH FAT LAYER EXPOSED: Primary | ICD-10-CM

## 2025-07-16 DIAGNOSIS — L60.9 DISEASE OF NAIL: ICD-10-CM

## 2025-07-16 PROCEDURE — 99999 PR PBB SHADOW E&M-EST. PATIENT-LVL III: CPT | Mod: PBBFAC,,, | Performed by: STUDENT IN AN ORGANIZED HEALTH CARE EDUCATION/TRAINING PROGRAM

## 2025-07-16 NOTE — PROGRESS NOTES
Subjective:     Patient ID: Rocco France is a 69 y.o. male.    Chief Complaint: Foot Problem, Foot Ulcer, and Follow-up (After hospital stay, pt has a pick line and Home Health)    Rocco is a 69 y.o. male who presents to the clinic upon referral from Dr. Ribera  for evaluation and treatment of diabetic feet. Rocco has a past medical history of Acute respiratory failure requiring reintubation (1/28/2022), CLARISSE (acute kidney injury) (1/11/2021), Diabetes mellitus, and Kidney stones. Patient relates no major problem with feet. Only complaints today consist of s/p right partial 1st ray amputation, here for suture removal. Complains of elongated toenails. Currently on IV antibiotics.     PCP: Jay Guardado,         Hemoglobin A1C   Date Value Ref Range Status   05/19/2023 6.6 (H) 4.0 - 5.6 % Final     Comment:     ADA Screening Guidelines:  5.7-6.4%  Consistent with prediabetes  >or=6.5%  Consistent with diabetes    High levels of fetal hemoglobin interfere with the HbA1C  assay. Heterozygous hemoglobin variants (HbS, HgC, etc)do  not significantly interfere with this assay.   However, presence of multiple variants may affect accuracy.     09/14/2022 6.5 (H) 4.0 - 5.6 % Final     Comment:     ADA Screening Guidelines:  5.7-6.4%  Consistent with prediabetes  >or=6.5%  Consistent with diabetes    High levels of fetal hemoglobin interfere with the HbA1C  assay. Heterozygous hemoglobin variants (HbS, HgC, etc)do  not significantly interfere with this assay.   However, presence of multiple variants may affect accuracy.     01/28/2022 7.7 (H) 4.0 - 5.6 % Final     Comment:     ADA Screening Guidelines:  5.7-6.4%  Consistent with prediabetes  >or=6.5%  Consistent with diabetes    High levels of fetal hemoglobin interfere with the HbA1C  assay. Heterozygous hemoglobin variants (HbS, HgC, etc)do  not significantly interfere with this assay.   However, presence of multiple variants may affect accuracy.       Hemoglobin  A1c   Date Value Ref Range Status   06/20/2025 12.4 (H) 4.0 - 5.6 % Final     Comment:     ADA Screening Guidelines:  5.7-6.4%  Consistent with prediabetes  >=6.5%  Consistent with diabetes    High levels of fetal hemoglobin interfere with the HbA1C  assay. Heterozygous hemoglobin variants (HbS, HgC, etc)do  not significantly interfere with this assay.   However, presence of multiple variants may affect accuracy.         Review of Systems   Constitutional: Negative for chills, decreased appetite, diaphoresis and fever.   HENT:  Negative for congestion and hearing loss.    Cardiovascular:  Negative for chest pain, claudication, leg swelling and syncope.   Respiratory:  Negative for cough and shortness of breath.    Skin:  Positive for nail changes and poor wound healing. Negative for color change, flushing, itching and rash.   Musculoskeletal:  Negative for arthritis, back pain, joint pain and joint swelling.   Gastrointestinal:  Negative for nausea and vomiting.   Neurological:  Negative for focal weakness and weakness.   Psychiatric/Behavioral:  Negative for altered mental status. The patient is not nervous/anxious.         Objective:     Physical Exam  Constitutional:       General: He is not in acute distress.     Appearance: Normal appearance. He is well-developed. He is not diaphoretic.   Cardiovascular:      Comments: Dorsalis pedis and posterior tibial pulses mildly diminished. Skin temperature is within normal limits. Toes are cool to touch and feet are warm proximally. Hair growth is diminished. Skin is atrophic and with mild hyperpigmentation. Mild edema noted, bilaterally.  Musculoskeletal:      Comments: Adequate joint range of motion without pain, limitation, nor crepitation to foot and ankle joints. Muscle strength is 5/5 in all groups bilaterally.       Feet:      Right foot:      Skin integrity: Dry skin present. No ulcer, blister, erythema or warmth.      Left foot:      Skin integrity: Dry skin  present. No ulcer, blister, erythema or warmth.   Skin:     General: Skin is warm and dry.      Capillary Refill: Capillary refill takes less than 2 seconds.      Comments: Skin is warm and dry, no acute signs of infection noted bilaterally      S/p right hallux amputation, see wound description below    Toenails thickened by 2-4 mm's, dystrophic, and darkened in coloration with subungual fungal debris.     Otherwise, no open wounds, macerations or hyperkeratotic lesions, bilaterally            Neurological:      Mental Status: He is alert and oriented to person, place, and time.      Sensory: Sensory deficit present.      Motor: No abnormal muscle tone.      Comments: Light touch is diminished. Byron-Florencio 5.07 monofilamant testing is diminished. Vibratory sensation is diminished bilaterally.   Psychiatric:         Mood and Affect: Mood normal.         Behavior: Behavior normal.         Thought Content: Thought content normal.         Sutures intact and skin well coapted centrally with distal and proximal dehiscence noted. Upon removal of sutures, site with ulcer, s/p debridement measures 8.0x1.0x0.2cm. Site with granular bleeding base, no signs of infection.  Assessment:      Encounter Diagnoses   Name Primary?    Diabetic ulcer of toe of right foot associated with type 2 diabetes mellitus, with fat layer exposed Yes    Disease of nail     Diabetic polyneuropathy associated with type 2 diabetes mellitus      Plan:     Rocco was seen today for foot problem, foot ulcer and follow-up.    Diagnoses and all orders for this visit:    Diabetic ulcer of toe of right foot associated with type 2 diabetes mellitus, with fat layer exposed  -     Wound Debridement  -     SUBSEQUENT HOME HEALTH ORDERS    Disease of nail  -     Routine Foot Care    Diabetic polyneuropathy associated with type 2 diabetes mellitus  -     Routine Foot Care  -     Wound Debridement      I counseled the patient on his conditions, their  implications and medical management.  Sutures removed, wound debrided, see attached note  Dressing changed per instructions below  Rest, elevate PRN  Return to clinic in 2-4 weeks, sooner PRN    Home health to change dressings as follows:  Remove foot dressings.   Cleanse wound with normal saline or wound . Dry well.    Apply gentian violet to madan wound maceration if needed.   Apply hydrafera blue ready to foot wound(s).   Next apply 4x4 gauze followed foam pad x 2 directly on top of wound bed   Place cast padding between toes to prevent pressure ulcers .  Top with football dressing consisting 3 rolls of cast padding. Top with flexient and secure in offloading shoe/boot.  Change 3 times per week and PRN

## 2025-07-16 NOTE — PROCEDURES
"Routine Foot Care    Date/Time: 7/16/2025 9:00 AM    Performed by: Latasha Elise DPM  Authorized by: Latasha Elise DPM    Time out: Immediately prior to procedure a "time out" was called to verify the correct patient, procedure, equipment, support staff and site/side marked as required.    Consent Done?:  Yes (Verbal)  Hyperkeratotic Skin Lesions?: No      Nail Care Type:  Debride(Left 1st Toe, Left 3rd Toe, Left 2nd Toe, Left 4th Toe, Left 5th Toe, Right 2nd Toe, Right 3rd Toe, Right 4th Toe and Right 5th Toe)  Patient tolerance:  Patient tolerated the procedure well with no immediate complications  Wound Debridement    Date/Time: 7/16/2025 9:00 AM    Performed by: Latasha Elise DPM  Authorized by: Latasha Elise DPM    Consent Done?:  Yes (Verbal)  Local anesthesia used?: No      Wound Details:    Location:  Right foot    Location:  Right Dorsal Foot    Type of Debridement:  Excisional       Length (cm):  8       Width (cm):  1       Depth (cm):  0.2       Area (sq cm):  6.28       Percent Debrided (%):  100       Total Area Debrided (sq cm):  6.28    Depth of debridement:  Subcutaneous tissue    Tissue debrided:  Subcutaneous    Devitalized tissue debrided:  Callus, Biofilm and Fibrin    Instruments:  Blade and Curette  Bleeding:  Minimal  Hemostasis Achieved: Yes  Method Used:  Pressure  Patient tolerance:  Patient tolerated the procedure well with no immediate complications     No cultures were taken during this visit     "

## 2025-07-17 ENCOUNTER — ANTI-COAG VISIT (OUTPATIENT)
Dept: CARDIOLOGY | Facility: CLINIC | Age: 70
End: 2025-07-17
Payer: MEDICARE

## 2025-07-17 DIAGNOSIS — Z95.811 LVAD (LEFT VENTRICULAR ASSIST DEVICE) PRESENT: Primary | ICD-10-CM

## 2025-07-17 LAB — INR PPP: 1.2

## 2025-07-17 PROCEDURE — 93793 ANTICOAG MGMT PT WARFARIN: CPT | Mod: S$GLB,,,

## 2025-07-17 NOTE — PROGRESS NOTES
[facility]    2025 2:56 PM    Assessment/Plan:    Patient presents today with subtherapeutic  INR.    Assessment of patient findings and chart review: no significant findings     Recommendation for patient's warfarin regimen: Boost dose today to 5mg then resume current maintenance dose    Recommend repeat INR Monday  _________________________________________________________________    Rocco ESTRADA Román (69 y.o.) is followed by the [employer] [facility].    Anticoagulation Summary  As of 2025      INR goal:  1.5-2.0   TTR:  57.6% (4.2 y)   INR used for dosin.2 (2025)   Warfarin maintenance plan:  5 mg (5 mg x 1) every Mon, Wed, Fri; 2.5 mg (5 mg x 0.5) all other days   Weekly warfarin total:  25 mg   Plan last modified:  Cassidy Snider, PharmD (2025)   Next INR check:  2025   Target end date:  --    Indications    LVAD (left ventricular assist device) present [Z95.811]                 Anticoagulation Episode Summary       INR check location:  --    Preferred lab:  --    Send INR reminders to:  [facility] COUMADIN LVAD    Comments:  LVAD //Nursing Care [facility]: [phone]; Fax: [phone]//[facility]  [phone]  //[facility] ph [phone]/ results Lab - [phone]          Anticoagulation Care Providers       Provider Role Specialty Phone number    Pete Garnett MD [facility] Cardiology [phone]

## 2025-07-21 ENCOUNTER — ANTI-COAG VISIT (OUTPATIENT)
Dept: CARDIOLOGY | Facility: CLINIC | Age: 70
End: 2025-07-21
Payer: MEDICARE

## 2025-07-21 DIAGNOSIS — Z95.811 LVAD (LEFT VENTRICULAR ASSIST DEVICE) PRESENT: Primary | ICD-10-CM

## 2025-07-21 LAB — INR PPP: 1.7

## 2025-07-21 PROCEDURE — 93793 ANTICOAG MGMT PT WARFARIN: CPT | Mod: S$GLB,,,

## 2025-07-21 NOTE — PROGRESS NOTES
[facility]    2025 4:16 PM    Assessment/Plan:    Patient presents today with therapeutic INR.    Assessment of patient findings and chart review: no significant findings     Recommendation for patient's warfarin regimen: Continue current maintenance dose    Recommend repeat INR Thursday  _________________________________________________________________    Rocco ESTRADA Román (69 y.o.) is followed by the [facility].    Anticoagulation Summary  As of 2025      INR goal:  1.5-2.0   TTR:  57.6% (4.3 y)   INR used for dosin.7 (2025)   Warfarin maintenance plan:  5 mg (5 mg x 1) every Mon, Wed, Fri; 2.5 mg (5 mg x 0.5) all other days   Weekly warfarin total:  25 mg   Plan last modified:  Cassidy Snider, PharmD (2025)   Next INR check:  2025   Target end date:  --    Indications    LVAD (left ventricular assist device) present [Z95.811]                 Anticoagulation Episode Summary       INR check location:  --    Preferred lab:  --    Send INR reminders to:  [facility] COUMADIN LVAD    Comments:  LVAD // FAX Nursing [facility]: [phone]; Fax: [phone]//[place] [address] [phone]  //[facility] ph [phone]/ results Lab - [phone]          Anticoagulation Care Providers       Provider Role Specialty Phone number    Pete Garnett MD [facility] Cardiology [phone]

## 2025-07-22 ENCOUNTER — TELEPHONE (OUTPATIENT)
Dept: TRANSPLANT | Facility: CLINIC | Age: 70
End: 2025-07-22
Payer: MEDICARE

## 2025-07-22 LAB
EXT ALBUMIN: 2.4
EXT ALT: 13
EXT AST: 18
EXT BILIRUBIN TOTAL: 1.1
EXT BUN: 20
EXT CALCIUM: 8.9
EXT CHLORIDE: 101
EXT CREATININE: 1.2 MG/DL
EXT CRP: 22.3
EXT GLUCOSE: 191
EXT HEMATOCRIT: 26.4
EXT HEMOGLOBIN: 8.4
EXT MAGNESIUM: 1.9
EXT PLATELETS: 190
EXT POTASSIUM: 4.5
EXT PROTEIN TOTAL: 9.2
EXT SODIUM: 137 MMOL/L
EXT WBC: 8

## 2025-07-22 NOTE — TELEPHONE ENCOUNTER
Called  to obtain labs results. Spoke with Miranda, stated they received them late and are working on faxing the results this morning. Will wait for results.

## 2025-07-24 ENCOUNTER — ANTI-COAG VISIT (OUTPATIENT)
Dept: CARDIOLOGY | Facility: CLINIC | Age: 70
End: 2025-07-24
Payer: MEDICARE

## 2025-07-24 DIAGNOSIS — E11.9 TYPE 2 DIABETES MELLITUS WITHOUT COMPLICATION, WITHOUT LONG-TERM CURRENT USE OF INSULIN: ICD-10-CM

## 2025-07-24 DIAGNOSIS — Z95.811 LVAD (LEFT VENTRICULAR ASSIST DEVICE) PRESENT: Primary | ICD-10-CM

## 2025-07-24 LAB — INR PPP: 1.2

## 2025-07-24 PROCEDURE — 93793 ANTICOAG MGMT PT WARFARIN: CPT | Mod: S$GLB,,,

## 2025-07-24 RX ORDER — INSULIN GLARGINE 100 [IU]/ML
9 INJECTION, SOLUTION SUBCUTANEOUS DAILY
Qty: 12 ML | Refills: 11 | Status: SHIPPED | OUTPATIENT
Start: 2025-07-24 | End: 2026-07-24

## 2025-07-24 NOTE — PROGRESS NOTES
[facility]    2025 2:34 PM    Assessment/Plan:    Patient presents today with subtherapeutic  INR.    Assessment of patient findings and chart review: no significant findings     Recommendation for patient's warfarin regimen: Boost dose today to 5mg then increase maintenance dose    Recommend repeat INR Monday  _________________________________________________________________    Rocco ESTRADA Román (69 y.o.) is followed by the [employer] [facility].    Anticoagulation Summary  As of 2025      INR goal:  1.5-2.0   TTR:  57.5% (4.3 y)   INR used for dosin.2 (2025)   Warfarin maintenance plan:  2.5 mg (5 mg x 0.5) every Tue, Thu, Sat; 5 mg (5 mg x 1) all other days   Weekly warfarin total:  27.5 mg   Plan last modified:  Cheryl Barahona, PharmD (2025)   Next INR check:  2025   Target end date:  --    Indications    LVAD (left ventricular assist device) present [Z95.811]                 Anticoagulation Episode Summary       INR check location:  --    Preferred lab:  --    Send INR reminders to:  [facility] COUM[facility]IN LVAD    Comments:  LVAD // Nursing [facility]: [phone]; Fax: [phone]//[facility]  [phone]  //[facility] ph [phone]/ results Lab - [phone]          Anticoagulation Care Providers       Provider Role Specialty Phone number    Pete Garnett MD [facility] Cardiology [phone]

## 2025-07-24 NOTE — TELEPHONE ENCOUNTER
----- Message from Med Assistant Rosa sent at 7/24/2025 11:21 AM CDT -----  Regarding: Refill  Viviana with Home Health is calling to request a refill for pt's insulin glargine U-100, Lantus, 100 unit/mL (3 mL) SubQ InPn pen. Pt is out.    LOV:7-16-25    [facility] - [address]   Phone: [phone]  Fax: [phone]      Thank you.

## 2025-07-29 ENCOUNTER — TELEPHONE (OUTPATIENT)
Dept: PODIATRY | Facility: CLINIC | Age: 70
End: 2025-07-29
Payer: MEDICARE

## 2025-07-30 ENCOUNTER — OFFICE VISIT (OUTPATIENT)
Dept: PODIATRY | Facility: CLINIC | Age: 70
End: 2025-07-30
Payer: MEDICARE

## 2025-07-30 ENCOUNTER — DOCUMENTATION ONLY (OUTPATIENT)
Dept: TRANSPLANT | Facility: CLINIC | Age: 70
End: 2025-07-30
Payer: MEDICARE

## 2025-07-30 ENCOUNTER — TELEPHONE (OUTPATIENT)
Dept: TRANSPLANT | Facility: CLINIC | Age: 70
End: 2025-07-30
Payer: MEDICARE

## 2025-07-30 VITALS — HEART RATE: 85 BPM | SYSTOLIC BLOOD PRESSURE: 110 MMHG | DIASTOLIC BLOOD PRESSURE: 84 MMHG

## 2025-07-30 DIAGNOSIS — L84 PRE-ULCERATIVE CALLUSES: ICD-10-CM

## 2025-07-30 DIAGNOSIS — E11.621 DIABETIC ULCER OF TOE OF RIGHT FOOT ASSOCIATED WITH TYPE 2 DIABETES MELLITUS, WITH FAT LAYER EXPOSED: ICD-10-CM

## 2025-07-30 DIAGNOSIS — E11.42 DIABETIC POLYNEUROPATHY ASSOCIATED WITH TYPE 2 DIABETES MELLITUS: ICD-10-CM

## 2025-07-30 DIAGNOSIS — M20.41 HAMMER TOE OF RIGHT FOOT: Primary | ICD-10-CM

## 2025-07-30 DIAGNOSIS — L97.512 DIABETIC ULCER OF TOE OF RIGHT FOOT ASSOCIATED WITH TYPE 2 DIABETES MELLITUS, WITH FAT LAYER EXPOSED: ICD-10-CM

## 2025-07-30 LAB
EXT ALBUMIN: 2.7
EXT ALT: 13
EXT AST: 20
EXT BILIRUBIN TOTAL: 1.2
EXT BUN: 18
EXT CALCIUM: 9.3
EXT CHLORIDE: 100
EXT CREATININE: 1.1 MG/DL
EXT CRP: 22.5
EXT GLUCOSE: 213
EXT HEMATOCRIT: 26.3
EXT HEMOGLOBIN: 8.3
EXT MAGNESIUM: 1.8
EXT PLATELETS: 171
EXT POTASSIUM: 3.7
EXT PROTEIN TOTAL: 9.4
EXT SODIUM: 137 MMOL/L
EXT WBC: 8.6

## 2025-07-30 PROCEDURE — 99999 PR PBB SHADOW E&M-EST. PATIENT-LVL III: CPT | Mod: PBBFAC,,, | Performed by: STUDENT IN AN ORGANIZED HEALTH CARE EDUCATION/TRAINING PROGRAM

## 2025-07-30 NOTE — PROCEDURES
Wound Debridement    Date/Time: 7/30/2025 8:45 AM    Performed by: Latasha Elise DPM  Authorized by: Latasha Elise DPM    Local anesthesia used?: No      Wound Details:    Location:  Right foot    Location:  Right Midfoot    Type of Debridement:  Excisional       Length (cm):  0.6       Width (cm):  0.5       Depth (cm):  0.2       Area (sq cm):  0.24       Percent Debrided (%):  100       Total Area Debrided (sq cm):  0.24    Depth of debridement:  Subcutaneous tissue    Tissue debrided:  Subcutaneous and Other    Devitalized tissue debrided:  Biofilm, Callus and Fibrin    Instruments:  Blade and Curette  Patient tolerance:  Patient tolerated the procedure well with no immediate complications     No cultures were taken during this visit     
Hyperlipidemia    Hypothyroid    Morbid obesity    Osteoarthritis    Parkinsons disease  diagnosed 5 years ago

## 2025-07-30 NOTE — TELEPHONE ENCOUNTER
Called , labs have resulted, will fax this morning.     ----- Message from NATHANAEL Ramirez sent at 6/27/2025  9:46 AM CDT -----  Regarding: IV Zosyn labs: CBC, CMP, CRP  [facility]: [facility] [place] ph [phone], fax [phone]  ----- Message -----  From: Ashley Aaron RN  Sent: 6/26/2025   8:22 AM CDT  To: [facility]  Subject: IV Zosyn labs: CBC, CMP, CRP                     [facility] [address], ph [phone], fax [phone]

## 2025-07-30 NOTE — PROGRESS NOTES
Subjective:     Patient ID: Rocco France is a 69 y.o. male.    Chief Complaint: Follow-up, Foot Ulcer, and Diabetes Mellitus    Rocco is a 69 y.o. male who presents to the clinic upon referral from Dr. Bonnie medeiros. provider found  for evaluation and treatment of diabetic feet. Rocco has a past medical history of Acute respiratory failure requiring reintubation (1/28/2022), CLARISSE (acute kidney injury) (1/11/2021), Diabetes mellitus, and Kidney stones. Patient relates no major problem with feet. Only complaints today consist of s/p right partial 1st ray amputation, here for suture removal. Complains of elongated toenails. Currently on IV antibiotics.     7/30/25: Seen today for follow up of medial ulcer and new pre-ulcerative callus to 2nd toe. No new pedal complaints.     PCP: Jay Guardado DO Dr. Sheetal Maragiri 06/20/2025    Hemoglobin A1C   Date Value Ref Range Status   05/19/2023 6.6 (H) 4.0 - 5.6 % Final     Comment:     ADA Screening Guidelines:  5.7-6.4%  Consistent with prediabetes  >or=6.5%  Consistent with diabetes    High levels of fetal hemoglobin interfere with the HbA1C  assay. Heterozygous hemoglobin variants (HbS, HgC, etc)do  not significantly interfere with this assay.   However, presence of multiple variants may affect accuracy.     09/14/2022 6.5 (H) 4.0 - 5.6 % Final     Comment:     ADA Screening Guidelines:  5.7-6.4%  Consistent with prediabetes  >or=6.5%  Consistent with diabetes    High levels of fetal hemoglobin interfere with the HbA1C  assay. Heterozygous hemoglobin variants (HbS, HgC, etc)do  not significantly interfere with this assay.   However, presence of multiple variants may affect accuracy.     01/28/2022 7.7 (H) 4.0 - 5.6 % Final     Comment:     ADA Screening Guidelines:  5.7-6.4%  Consistent with prediabetes  >or=6.5%  Consistent with diabetes    High levels of fetal hemoglobin interfere with the HbA1C  assay. Heterozygous hemoglobin variants (HbS, HgC, etc)do  not  significantly interfere with this assay.   However, presence of multiple variants may affect accuracy.       Hemoglobin A1c   Date Value Ref Range Status   06/20/2025 12.4 (H) 4.0 - 5.6 % Final     Comment:     ADA Screening Guidelines:  5.7-6.4%  Consistent with prediabetes  >=6.5%  Consistent with diabetes    High levels of fetal hemoglobin interfere with the HbA1C  assay. Heterozygous hemoglobin variants (HbS, HgC, etc)do  not significantly interfere with this assay.   However, presence of multiple variants may affect accuracy.         Review of Systems   Constitutional: Negative for chills, decreased appetite, diaphoresis and fever.   HENT:  Negative for congestion and hearing loss.    Cardiovascular:  Negative for chest pain, claudication, leg swelling and syncope.   Respiratory:  Negative for cough and shortness of breath.    Skin:  Positive for nail changes and poor wound healing. Negative for color change, flushing, itching and rash.   Musculoskeletal:  Negative for arthritis, back pain, joint pain and joint swelling.   Gastrointestinal:  Negative for nausea and vomiting.   Neurological:  Negative for focal weakness and weakness.   Psychiatric/Behavioral:  Negative for altered mental status. The patient is not nervous/anxious.         Objective:     Physical Exam  Constitutional:       General: He is not in acute distress.     Appearance: Normal appearance. He is well-developed. He is not diaphoretic.   Cardiovascular:      Comments: Dorsalis pedis and posterior tibial pulses mildly diminished. Skin temperature is within normal limits. Toes are cool to touch and feet are warm proximally. Hair growth is diminished. Skin is atrophic and with mild hyperpigmentation. Mild edema noted, bilaterally.  Musculoskeletal:      Comments: Adequate joint range of motion without pain, limitation, nor crepitation to foot and ankle joints. Muscle strength is 5/5 in all groups bilaterally.       Feet:      Right foot:       Skin integrity: Dry skin present. No ulcer, blister, erythema or warmth.      Left foot:      Skin integrity: Dry skin present. No ulcer, blister, erythema or warmth.   Skin:     General: Skin is warm and dry.      Capillary Refill: Capillary refill takes less than 2 seconds.      Comments: Skin is warm and dry, no acute signs of infection noted bilaterally      S/p right hallux amputation, see wound description below    Toenails thickened by 2-4 mm's, dystrophic, and darkened in coloration with subungual fungal debris.     Otherwise, no open wounds, macerations or hyperkeratotic lesions, bilaterally            Neurological:      Mental Status: He is alert and oriented to person, place, and time.      Sensory: Sensory deficit present.      Motor: No abnormal muscle tone.      Comments: Light touch is diminished. [place] 5.07 monofilamant testing is diminished. Vibratory sensation is diminished bilaterally.   Psychiatric:         Mood and Affect: Mood normal.         Behavior: Behavior normal.         Thought Content: Thought content normal.         Site with ulcer, s/p debridement measures 0.5x0.6x.2cm. Site with granular bleeding base, no signs of infection. Preulcerative callus to distal tip of right 2nd toe secondary to semirigid hammertoe deformity  Assessment:      Encounter Diagnoses   Name Primary?    Diabetic polyneuropathy associated with type 2 diabetes mellitus     Hammer toe of right foot Yes    Pre-ulcerative calluses     Diabetic ulcer of toe of right foot associated with type 2 diabetes mellitus, with fat layer exposed        Plan:     Rocco was seen today for follow-up, foot ulcer and diabetes mellitus.    Diagnoses and all orders for this visit:    Hammer toe of right foot    Diabetic polyneuropathy associated with type 2 diabetes mellitus    Pre-ulcerative calluses    Diabetic ulcer of toe of right foot associated with type 2 diabetes mellitus, with fat layer exposed  -     SUBSEQUENT  HOME HEALTH ORDERS  -     Wound Debridement        I counseled the patient on his conditions, their implications and medical management.  Wound debrided, see attached note  Dressing changed per instructions below  Rest, elevate PRN  Discussed pre-ulcerative callus to right 2nd toe is secondary to hammertoe deformity. Recommend percutaneous flexor tenotomy to reduce contracture. Patient is in agreement with plan. Plan for procedure on following visit  Return to clinic in 2-4 weeks, sooner PRN    Home health to change dressings as follows:  Remove foot dressings.   Cleanse wound with normal saline or wound . Dry well.    Apply gentian violet to madan wound maceration if needed.   Apply hydrafera blue ready to foot wound(s).   Next apply 4x4 gauze followed foam pad x 2 directly on top of wound bed   Place cast padding between toes to prevent pressure ulcers .  Top with football dressing consisting 3 rolls of cast padding. Top with flexient and secure in offloading shoe/boot.  Change 3 times per week and PRN

## 2025-07-31 ENCOUNTER — ANTI-COAG VISIT (OUTPATIENT)
Dept: CARDIOLOGY | Facility: CLINIC | Age: 70
End: 2025-07-31
Payer: MEDICARE

## 2025-07-31 ENCOUNTER — TELEPHONE (OUTPATIENT)
Dept: INFECTIOUS DISEASES | Facility: CLINIC | Age: 70
End: 2025-07-31
Payer: MEDICARE

## 2025-07-31 DIAGNOSIS — Z95.811 LVAD (LEFT VENTRICULAR ASSIST DEVICE) PRESENT: Primary | ICD-10-CM

## 2025-07-31 LAB — INR PPP: 1.2

## 2025-07-31 PROCEDURE — 93793 ANTICOAG MGMT PT WARFARIN: CPT | Mod: S$GLB,,,

## 2025-07-31 NOTE — PROGRESS NOTES
Patient reports correct Warfarin dose, may have missed Warfarin dose 7/30/2025 but not certain, no other changes reported.

## 2025-07-31 NOTE — TELEPHONE ENCOUNTER
PICC line removal appointment at [facility] was rescheduled to Wednesday 8/6/2025. Confirmed appointment with patient.    Spoke to Flaquita at [facility] to continue line care until PICC is removed. Flaquita confirmed and asked if we can send faxed orders as well.

## 2025-07-31 NOTE — PROGRESS NOTES
[facility]    2025 3:48 PM    Assessment/Plan:    Patient presents today with subtherapeutic  INR.    Assessment of patient findings and chart review: believes he missed dose yesterday    Recommendation for patient's warfarin regimen: Boost dose today to 5mg then resume current maintenance dose    Recommend repeat INR Monday  _________________________________________________________________    Rocco ESTRADA Román (69 y.o.) is followed by the [facility].    Anticoagulation Summary  As of 2025      INR goal:  1.5-2.0   TTR:  57.3% (4.3 y)   INR used for dosin.2 (2025)   Warfarin maintenance plan:  2.5 mg (5 mg x 0.5) every Tue, Sat; 5 mg (5 mg x 1) all other days   Weekly warfarin total:  30 mg   Plan last modified:  Cheryl Barahona, PharmD (2025)   Next INR check:  2025   Target end date:  --    Indications    LVAD (left ventricular assist device) present [Z95.811]                 Anticoagulation Episode Summary       INR check location:  --    Preferred lab:  --    Send INR reminders to:  [facility] COUMADIN LVAD    Comments:  LVAD // [facility]: [phone]; Fax: [phone]//[facility]  [phone]  //[facility] ph [phone]/ results Lab - [phone]          Anticoagulation Care Providers       Provider Role Specialty Phone number    Pete Garnett MD [facility] Cardiology [phone]

## 2025-07-31 NOTE — PROGRESS NOTES
Johanna with [facility] called in a verbal result as: INR 1.2 / PT -12.7 dated today 7/31/25, states same INR result as: 7/24

## 2025-08-01 ENCOUNTER — TELEPHONE (OUTPATIENT)
Dept: INFECTIOUS DISEASES | Facility: CLINIC | Age: 70
End: 2025-08-01
Payer: MEDICARE

## 2025-08-01 NOTE — TELEPHONE ENCOUNTER
Spoke to Sade from [facility] to confirm they received faxed orders to extend patient's line care until 8/6/2025. Sade confirmed they received them.

## 2025-08-04 ENCOUNTER — ANTI-COAG VISIT (OUTPATIENT)
Dept: CARDIOLOGY | Facility: CLINIC | Age: 70
End: 2025-08-04
Payer: MEDICARE

## 2025-08-04 DIAGNOSIS — Z95.811 LVAD (LEFT VENTRICULAR ASSIST DEVICE) PRESENT: Primary | ICD-10-CM

## 2025-08-04 LAB — INR PPP: 1.3

## 2025-08-04 PROCEDURE — 93793 ANTICOAG MGMT PT WARFARIN: CPT | Mod: S$GLB,,,

## 2025-08-04 NOTE — PROGRESS NOTES
[facility]    2025 10:34 AM    Assessment/Plan:    Patient presents today with subtherapeutic  INR.    Assessment of patient findings and chart review: INR below goal and no changes reported per patient (possibly 2/2 missed warfarin dose last week)    Recommendation for patient's warfarin regimen: Boost dose today to 7.5mg then resume current maintenance dose    Recommend repeat INR in 3 days  _________________________________________________________________    Rocco France (69 y.o.) is followed by the [facility].    Anticoagulation Summary  As of 2025      INR goal:  1.5-2.0   TTR:  57.1% (4.3 y)   INR used for dosin.3 (2025)   Warfarin maintenance plan:  2.5 mg (5 mg x 0.5) every Tue, Thu, Sat; 5 mg (5 mg x 1) all other days   Weekly warfarin total:  27.5 mg   Plan last modified:  Cheryl Barahona, PharmD (2025)   Next INR check:  2025   Target end date:  --    Indications    LVAD (left ventricular assist device) present [Z95.811]                 Anticoagulation Episode Summary       INR check location:  --    Preferred lab:  --    Send INR reminders to:  [facility] COUMADIN LVAD    Comments:  LVAD // Nursing [facility]: [phone]; Fax: [phone]//[facility] -[phone]  //[facility] ph [phone]/ results Lab - [phone]          Anticoagulation Care Providers       Provider Role Specialty Phone number    Pete Garnett MD [facility] Cardiology [phone]

## 2025-08-05 ENCOUNTER — DOCUMENTATION ONLY (OUTPATIENT)
Dept: TRANSPLANT | Facility: CLINIC | Age: 70
End: 2025-08-05
Payer: MEDICARE

## 2025-08-05 NOTE — PROGRESS NOTES
Patient's labs reviewed, labs scanned into the  by ID. Lab work WNL for patient's baseline, no changes made at this time. Plan for VAD team to continue to monitor patient's labs.

## 2025-08-06 ENCOUNTER — OFFICE VISIT (OUTPATIENT)
Dept: PODIATRY | Facility: CLINIC | Age: 70
End: 2025-08-06
Payer: MEDICARE

## 2025-08-06 ENCOUNTER — INFUSION (OUTPATIENT)
Dept: INFECTIOUS DISEASES | Facility: HOSPITAL | Age: 70
End: 2025-08-06
Payer: MEDICARE

## 2025-08-06 VITALS — SYSTOLIC BLOOD PRESSURE: 108 MMHG | DIASTOLIC BLOOD PRESSURE: 82 MMHG | HEART RATE: 85 BPM

## 2025-08-06 VITALS
RESPIRATION RATE: 18 BRPM | HEIGHT: 74 IN | OXYGEN SATURATION: 99 % | WEIGHT: 191.94 LBS | DIASTOLIC BLOOD PRESSURE: 68 MMHG | SYSTOLIC BLOOD PRESSURE: 95 MMHG | HEART RATE: 84 BPM | BODY MASS INDEX: 24.63 KG/M2 | TEMPERATURE: 98 F

## 2025-08-06 DIAGNOSIS — M20.41 HAMMER TOE OF RIGHT FOOT: Primary | ICD-10-CM

## 2025-08-06 DIAGNOSIS — R78.81 BACTEREMIA: Primary | ICD-10-CM

## 2025-08-06 DIAGNOSIS — L84 PRE-ULCERATIVE CALLUSES: ICD-10-CM

## 2025-08-06 PROCEDURE — 99211 OFF/OP EST MAY X REQ PHY/QHP: CPT

## 2025-08-06 PROCEDURE — 99999 PR PBB SHADOW E&M-EST. PATIENT-LVL III: CPT | Mod: PBBFAC,,, | Performed by: STUDENT IN AN ORGANIZED HEALTH CARE EDUCATION/TRAINING PROGRAM

## 2025-08-06 NOTE — PROGRESS NOTES
Subjective:     Patient ID: Rocco France is a 69 y.o. male.    Chief Complaint: Follow-up (Follow up hammer toe correction of right 2nd toe (tenotomy))    Rocco is a 69 y.o. male who presents to the clinic upon referral from Dr. Elise  for evaluation and treatment of diabetic feet. Rocco has a past medical history of Acute respiratory failure requiring reintubation (1/28/2022), CLARISSE (acute kidney injury) (1/11/2021), Diabetes mellitus, and Kidney stones. Patient relates no major problem with feet. Only complaints today consist of s/p right partial 1st ray amputation, here for suture removal. Complains of elongated toenails. Currently on IV antibiotics.     7/30/25: Seen today for follow up of medial ulcer and new pre-ulcerative callus to 2nd toe. No new pedal complaints.     8/6/25: Seen today for percutaneous flexor tenotomy to 2nd toe. Ulcer is healed.     PCP: Jay Guardado DO Dr. Sheetal Maragiri 06/20/2025    Hemoglobin A1C   Date Value Ref Range Status   05/19/2023 6.6 (H) 4.0 - 5.6 % Final     Comment:     ADA Screening Guidelines:  5.7-6.4%  Consistent with prediabetes  >or=6.5%  Consistent with diabetes    High levels of fetal hemoglobin interfere with the HbA1C  assay. Heterozygous hemoglobin variants (HbS, HgC, etc)do  not significantly interfere with this assay.   However, presence of multiple variants may affect accuracy.     09/14/2022 6.5 (H) 4.0 - 5.6 % Final     Comment:     ADA Screening Guidelines:  5.7-6.4%  Consistent with prediabetes  >or=6.5%  Consistent with diabetes    High levels of fetal hemoglobin interfere with the HbA1C  assay. Heterozygous hemoglobin variants (HbS, HgC, etc)do  not significantly interfere with this assay.   However, presence of multiple variants may affect accuracy.     01/28/2022 7.7 (H) 4.0 - 5.6 % Final     Comment:     ADA Screening Guidelines:  5.7-6.4%  Consistent with prediabetes  >or=6.5%  Consistent with diabetes    High levels of fetal  hemoglobin interfere with the HbA1C  assay. Heterozygous hemoglobin variants (HbS, HgC, etc)do  not significantly interfere with this assay.   However, presence of multiple variants may affect accuracy.       Hemoglobin A1c   Date Value Ref Range Status   06/20/2025 12.4 (H) 4.0 - 5.6 % Final     Comment:     ADA Screening Guidelines:  5.7-6.4%  Consistent with prediabetes  >=6.5%  Consistent with diabetes    High levels of fetal hemoglobin interfere with the HbA1C  assay. Heterozygous hemoglobin variants (HbS, HgC, etc)do  not significantly interfere with this assay.   However, presence of multiple variants may affect accuracy.         Review of Systems   Constitutional: Negative for chills, decreased appetite, diaphoresis and fever.   HENT:  Negative for congestion and hearing loss.    Cardiovascular:  Negative for chest pain, claudication, leg swelling and syncope.   Respiratory:  Negative for cough and shortness of breath.    Skin:  Positive for nail changes and poor wound healing. Negative for color change, flushing, itching and rash.   Musculoskeletal:  Negative for arthritis, back pain, joint pain and joint swelling.   Gastrointestinal:  Negative for nausea and vomiting.   Neurological:  Negative for focal weakness and weakness.   Psychiatric/Behavioral:  Negative for altered mental status. The patient is not nervous/anxious.         Objective:     Physical Exam  Constitutional:       General: He is not in acute distress.     Appearance: Normal appearance. He is well-developed. He is not diaphoretic.   Cardiovascular:      Comments: Dorsalis pedis and posterior tibial pulses mildly diminished. Skin temperature is within normal limits. Toes are cool to touch and feet are warm proximally. Hair growth is diminished. Skin is atrophic and with mild hyperpigmentation. Mild edema noted, bilaterally.  Musculoskeletal:      Comments: Adequate joint range of motion without pain, limitation, nor crepitation to foot and  ankle joints. Muscle strength is 5/5 in all groups bilaterally.       Feet:      Right foot:      Skin integrity: Dry skin present. No ulcer, blister, erythema or warmth.      Left foot:      Skin integrity: Dry skin present. No ulcer, blister, erythema or warmth.   Skin:     General: Skin is warm and dry.      Capillary Refill: Capillary refill takes less than 2 seconds.      Comments: Skin is warm and dry, no acute signs of infection noted bilaterally      S/p right hallux amputation, see wound description below    Toenails thickened by 2-4 mm's, dystrophic, and darkened in coloration with subungual fungal debris.     Otherwise, no open wounds, macerations or hyperkeratotic lesions, bilaterally            Neurological:      Mental Status: He is alert and oriented to person, place, and time.      Sensory: Sensory deficit present.      Motor: No abnormal muscle tone.      Comments: Light touch is diminished. [place] 5.07 monofilamant testing is diminished. Vibratory sensation is diminished bilaterally.   Psychiatric:         Mood and Affect: Mood normal.         Behavior: Behavior normal.         Thought Content: Thought content normal.       Ulcer is healed. Preulcerative callus to distal tip of right 2nd toe secondary to semirigid hammertoe deformity  Assessment:      Encounter Diagnoses   Name Primary?    Hammer toe of right foot Yes    Pre-ulcerative calluses          Plan:     Rocco was seen today for follow-up.    Diagnoses and all orders for this visit:    Hammer toe of right foot    Pre-ulcerative calluses          I counseled the patient on his conditions, their implications and medical management.  Percutaneous flexor tenotomy per attached note. Site dressed with xeroform, 4x4 gauze, cast padding and flexinet in football dressing. Secured in darco shoe. He is to keep dressings C/D/I until follow up visit  Rest, elevate PRN  Return to clinic in 2-4 weeks, sooner PRN

## 2025-08-06 NOTE — PROGRESS NOTES
Patient arrives ambulatory for PICC removal as ordered - PICC removed without difficulty from right upper medial aspect of arm - measurement marked at 33 cm with entire catheter intact without compromise noted - sterile vaseline gauze placed and large co-flex wrap with instructions to leave dry and in place x 24 hours . No signs of infection noted to site - no swelling or drainage - Will monitor on unit for 30 minutes as per protocol

## 2025-08-06 NOTE — PROCEDURES
Procedures    Treatment options discussed with patient. Patient would like procedure to help alleviate plantar pressures causing callus  Patient understands all potential risks and complications as well as alternatives. No guarantees are given or implied as to the outcome. Consent forms read signed witnessed and the chart. See op report.     FLEXOR TENOTOMY  OP REPORT     SURGEON: Latasha Elise DPM   PRE-OP DX: Flexible hammertoe right 2nd digit w/ callus build up   POST-OP DX: same   PROCEDURE: percutaneous  flexor tendon tenotomy   ANESTHESIA: none needed due to neuropathy   HEMOSTASIS: pressure   EBL: Less than 3 cc     Betadine was applied to area for sterilization. Next utilizing a 64 blade a transverse stab incision was made directly overlying the flexor tendon of the 2nd  toe. The tendon was next transected with one swipe of the blade. The digit was next stretched . The area was flushed with alcohol and dried, and dressed with antibiotic cream and a dry, sterile, compressive dressing. Patient tolerated the procedure well. Written and verbal post-operative instructions were dispensed to the patient on post-operative toe care. Pt. to follow-up in one week but should call immediately if any signs of infection, such as fever, chills, sweats, increased redness or pain.

## 2025-08-07 ENCOUNTER — ANTI-COAG VISIT (OUTPATIENT)
Dept: CARDIOLOGY | Facility: CLINIC | Age: 70
End: 2025-08-07
Payer: MEDICARE

## 2025-08-07 DIAGNOSIS — Z95.811 LVAD (LEFT VENTRICULAR ASSIST DEVICE) PRESENT: Primary | ICD-10-CM

## 2025-08-07 LAB — INR PPP: 1.5

## 2025-08-07 PROCEDURE — 93793 ANTICOAG MGMT PT WARFARIN: CPT | Mod: S$GLB,,,

## 2025-08-07 NOTE — PROGRESS NOTES
08/07/2025-Pt didn't have INR at [facility] yesterday, Connie [facility]  stated they tried to do home visit and pt was not home, and that they are scheduled to do home visit with INR today.

## 2025-08-07 NOTE — PROGRESS NOTES
[facility]    2025 2:56 PM    Assessment/Plan:    Patient presents today with therapeutic INR.    Assessment of patient findings and chart review: no significant findings     Recommendation for patient's warfarin regimen: Continue current maintenance dose    Recommend repeat INR Monday  _________________________________________________________________    Rocco ESTARDA Román (69 y.o.) is followed by the [facility].    Anticoagulation Summary  As of 2025      INR goal:  1.5-2.0   TTR:  57.0% (4.3 y)   INR used for dosin.5 (2025)   Warfarin maintenance plan:  2.5 mg (5 mg x 0.5) every Tue, Thu, Sat; 5 mg (5 mg x 1) all other days   Weekly warfarin total:  27.5 mg   Plan last modified:  Cheryl Barahona, PharmD (2025)   Next INR check:  2025   Target end date:  --    Indications    LVAD (left ventricular assist device) present [Z95.811]                 Anticoagulation Episode Summary       INR check location:  --    Preferred lab:  --    Send INR reminders to:  [facility] COUMADIN LVAD    Comments:  LVAD // [facility]: [phone]; Fax: [phone]//[facility] -[phone]  //[facility] ph [phone]/ results Lab - [phone]          Anticoagulation Care Providers       Provider Role Specialty Phone number    Pete Garnett MD [facility] Cardiology [phone]

## 2025-08-07 NOTE — PROGRESS NOTES
8/7- Felisa from [facility]  reported a verbal PT of 15.3 and an INR of 1.5 from 8/7/25. Felisa will also be faxing over pt's results.

## 2025-08-11 ENCOUNTER — ANTI-COAG VISIT (OUTPATIENT)
Dept: CARDIOLOGY | Facility: CLINIC | Age: 70
End: 2025-08-11
Payer: MEDICARE

## 2025-08-11 DIAGNOSIS — Z95.811 LVAD (LEFT VENTRICULAR ASSIST DEVICE) PRESENT: Primary | ICD-10-CM

## 2025-08-11 LAB — INR PPP: 1.6

## 2025-08-11 PROCEDURE — 93793 ANTICOAG MGMT PT WARFARIN: CPT | Mod: S$GLB,,,

## 2025-08-13 ENCOUNTER — DOCUMENTATION ONLY (OUTPATIENT)
Dept: TRANSPLANT | Facility: CLINIC | Age: 70
End: 2025-08-13
Payer: MEDICARE

## 2025-08-13 ENCOUNTER — OFFICE VISIT (OUTPATIENT)
Dept: PODIATRY | Facility: CLINIC | Age: 70
End: 2025-08-13
Payer: MEDICARE

## 2025-08-13 VITALS
DIASTOLIC BLOOD PRESSURE: 78 MMHG | HEART RATE: 87 BPM | SYSTOLIC BLOOD PRESSURE: 111 MMHG | WEIGHT: 192 LBS | BODY MASS INDEX: 24.64 KG/M2 | HEIGHT: 74 IN

## 2025-08-13 DIAGNOSIS — M20.41 HAMMER TOE OF RIGHT FOOT: ICD-10-CM

## 2025-08-13 DIAGNOSIS — Z87.828 HEALED WOUND: Primary | ICD-10-CM

## 2025-08-13 DIAGNOSIS — L84 PRE-ULCERATIVE CALLUSES: ICD-10-CM

## 2025-08-13 PROCEDURE — 99999 PR PBB SHADOW E&M-EST. PATIENT-LVL III: CPT | Mod: PBBFAC,,, | Performed by: STUDENT IN AN ORGANIZED HEALTH CARE EDUCATION/TRAINING PROGRAM

## 2025-08-18 ENCOUNTER — ANTI-COAG VISIT (OUTPATIENT)
Dept: CARDIOLOGY | Facility: CLINIC | Age: 70
End: 2025-08-18
Payer: MEDICARE

## 2025-08-18 DIAGNOSIS — Z95.811 LVAD (LEFT VENTRICULAR ASSIST DEVICE) PRESENT: Primary | ICD-10-CM

## 2025-08-18 LAB — INR PPP: 1.6

## 2025-08-18 PROCEDURE — 93793 ANTICOAG MGMT PT WARFARIN: CPT | Mod: S$GLB,,,

## 2025-08-25 ENCOUNTER — ANTI-COAG VISIT (OUTPATIENT)
Dept: CARDIOLOGY | Facility: CLINIC | Age: 70
End: 2025-08-25
Payer: MEDICARE

## 2025-08-25 DIAGNOSIS — Z95.811 LVAD (LEFT VENTRICULAR ASSIST DEVICE) PRESENT: Primary | ICD-10-CM

## 2025-08-25 LAB — INR PPP: 1.9

## 2025-08-25 PROCEDURE — 93793 ANTICOAG MGMT PT WARFARIN: CPT | Mod: S$GLB,,,

## 2025-08-27 ENCOUNTER — TELEPHONE (OUTPATIENT)
Dept: TRANSPLANT | Facility: CLINIC | Age: 70
End: 2025-08-27
Payer: MEDICARE

## 2025-08-29 ENCOUNTER — TELEPHONE (OUTPATIENT)
Dept: OPHTHALMOLOGY | Facility: CLINIC | Age: 70
End: 2025-08-29
Payer: MEDICARE

## 2025-08-29 ENCOUNTER — TELEPHONE (OUTPATIENT)
Dept: TRANSPLANT | Facility: CLINIC | Age: 70
End: 2025-08-29
Payer: MEDICARE

## 2025-08-29 DIAGNOSIS — Z95.811 HEART REPLACED BY HEART ASSIST DEVICE: Primary | ICD-10-CM

## 2025-09-03 ENCOUNTER — ANTI-COAG VISIT (OUTPATIENT)
Dept: CARDIOLOGY | Facility: CLINIC | Age: 70
End: 2025-09-03
Payer: MEDICARE

## 2025-09-03 DIAGNOSIS — Z95.811 LVAD (LEFT VENTRICULAR ASSIST DEVICE) PRESENT: Primary | ICD-10-CM

## 2025-09-03 LAB — INR PPP: 1.9

## 2025-09-03 PROCEDURE — 93793 ANTICOAG MGMT PT WARFARIN: CPT | Mod: S$GLB,,,

## (undated) DEVICE — DRESSING TRANS 8X12 TEGADERM

## (undated) DEVICE — TRAY HEART

## (undated) DEVICE — KIT COLLECTION E SWAB REGULAR

## (undated) DEVICE — BOOT SUTURE AID

## (undated) DEVICE — SUT PROLENE 5-0 36IN C-1

## (undated) DEVICE — SOL 9P NACL IRR PIC IL

## (undated) DEVICE — RETRACTOR OCTOBASE INSERT HOLD

## (undated) DEVICE — Device

## (undated) DEVICE — PACK DRAPE UNIVERSAL CONVERTOR

## (undated) DEVICE — TOURNIQUET SB QC DP 18X4IN

## (undated) DEVICE — PACK SET UP CONVERTORS

## (undated) DEVICE — PAD CAST SPECIALIST STRL 4

## (undated) DEVICE — ELECTRODE REM PLYHSV RETURN 9

## (undated) DEVICE — SUT SILK 2-0 SH 18IN BLACK

## (undated) DEVICE — PAD K-THERMIA 24IN X 60IN

## (undated) DEVICE — DRESSING TRANS 6X8 TEGADERM

## (undated) DEVICE — TRAY MINOR GEN SURG

## (undated) DEVICE — SHEATH INTRODUCER 7FR 11CM

## (undated) DEVICE — PAD ADHESIVE TAPE REMOVER

## (undated) DEVICE — SUT 2/0 36IN COATED VICRYL

## (undated) DEVICE — CATH THORACIC 32FR ST

## (undated) DEVICE — SUT SILK BLK BR. 2 2-60

## (undated) DEVICE — CONTAINER SPECIMEN STRL 4OZ

## (undated) DEVICE — GOWN POLY REINF BRTH SLV XL

## (undated) DEVICE — DRESSING TRANS 4X4 TEGADERM

## (undated) DEVICE — SEE MEDLINE ITEM 156894

## (undated) DEVICE — DRESSING AQUACEL SACRAL 9 X 9

## (undated) DEVICE — SUT BONE WAX 2.5 GRMS 12/BX

## (undated) DEVICE — CLOSURE SKIN STERI STRIP 1/2X4

## (undated) DEVICE — PAD RADIOLUCENT STAT ADULT

## (undated) DEVICE — SEE MEDLINE ITEM 146347

## (undated) DEVICE — SPONGE GAUZE 16PLY 4X4

## (undated) DEVICE — CATH SWAN GANZ STND 7FR

## (undated) DEVICE — PENCIL ROCKER SWITCH 10FT CORD

## (undated) DEVICE — KIT MICROINTRODUCE MINI 5X10CM

## (undated) DEVICE — SUT VICRYL BR 1 GEN 27 CT-1

## (undated) DEVICE — SEE MEDLINE ITEM 152512

## (undated) DEVICE — GLOVE BIOGEL PI MICRO SZ 7.5

## (undated) DEVICE — DRESSING AQUACEL AG ADV 3.5X12

## (undated) DEVICE — DRAPE T EXTRM SURG 121X128X90

## (undated) DEVICE — VALVE ULTRASITE MALE LUER

## (undated) DEVICE — SPONGE COTTON TRAY 4X4IN

## (undated) DEVICE — BLADE SAW STERNAL 5/BX

## (undated) DEVICE — DRESSING XEROFORM NONADH 1X8IN

## (undated) DEVICE — KIT URINARY CATH URINE METER

## (undated) DEVICE — KIT PROBE COVER WITH GEL

## (undated) DEVICE — BANDAGE ESMARK ELASTIC ST 4X9

## (undated) DEVICE — GLOVE SURG BIOGEL LATEX SZ 7.5

## (undated) DEVICE — SPONGE LAP 18X18 PREWASHED

## (undated) DEVICE — NDL BOX COUNTER

## (undated) DEVICE — SET MICROPUNCTURE 5FR 501NT

## (undated) DEVICE — GAUZE SPONGE 4X4 12PLY

## (undated) DEVICE — PAD DEFIB CADENCE ADULT R2

## (undated) DEVICE — SOL NS 1000CC

## (undated) DEVICE — BOOT AIR FLUID HEEL ADLT STD

## (undated) DEVICE — DRAPE INCISE IOBAN 2 23X17IN

## (undated) DEVICE — STAPLER SKIN PROXIMATE WIDE

## (undated) DEVICE — DRAPE THREE-QTR REINF 53X77IN

## (undated) DEVICE — KIT SAHARA DRAPE DRAW/LIFT

## (undated) DEVICE — SUT 2/0 36IN ETHIBOND EXCE

## (undated) DEVICE — SEE MEDLINE ITEM 146417

## (undated) DEVICE — TRAY SKIN SCRUB WET PREMIUM

## (undated) DEVICE — DRAPE STERI INSTRUMENT 1018

## (undated) DEVICE — TRAY MINOR ORTHO OMC

## (undated) DEVICE — SUT 3-0 CTD VICRYL 27IN PS

## (undated) DEVICE — SUT PROLENE 5-0 BL C-1 4-24

## (undated) DEVICE — SUT PROLENE 4-0 SH BLU 36IN

## (undated) DEVICE — SEE MEDLINE ITEM 152622

## (undated) DEVICE — PLEDGET SUT SOFT 3/8X3/16X1/16

## (undated) DEVICE — WIPE ESENTA BARR STNG FREE 3ML

## (undated) DEVICE — CATH THOR STND RGHT ANG 32FR

## (undated) DEVICE — DRAPE SPLIT STERILE

## (undated) DEVICE — GAUZE CNFRM STRL 4INX4.1YD

## (undated) DEVICE — DRESSING ABSRBNT ISLAND 3.6X8

## (undated) DEVICE — SEE MEDLINE ITEM 146313

## (undated) DEVICE — SET DECANTER MEDICHOICE

## (undated) DEVICE — SUT 6 18IN STEEL MONO CCS

## (undated) DEVICE — TAPE SURG MEDIPORE 6X72IN

## (undated) DEVICE — DRAIN CHEST DRY SUCTION

## (undated) DEVICE — SWABSTICK BENZOIN 4 IN

## (undated) DEVICE — ELECTRODE MEGADYNE RETURN DUAL

## (undated) DEVICE — BANDAGE ELAS SOFTWRAP ST 4X5YD

## (undated) DEVICE — BLADE 4 INCH EDGE UN-INS

## (undated) DEVICE — TAPE SILK 3IN

## (undated) DEVICE — CONNECTOR 1/4X3/16X3/16 Y

## (undated) DEVICE — DRAPE SLUSH WARMER WITH DISC

## (undated) DEVICE — APPLICATOR CHLORAPREP ORN 26ML

## (undated) DEVICE — STOCKINET 4INX48

## (undated) DEVICE — SEE MEDLINE ITEM 157117

## (undated) DEVICE — SUT PROLENE 4-0 RB-1 BL MO

## (undated) DEVICE — SEE MEDLINE ITEM 152523

## (undated) DEVICE — SEE MEDLINE ITEM 154981

## (undated) DEVICE — HOLDER TUBE

## (undated) DEVICE — HEMOSTAT SURGICEL NU-KNIT 6X9

## (undated) DEVICE — SUT ETHIBOND XTRA 1 CTX

## (undated) DEVICE — ELECTRODE EXTENDED BLADE